# Patient Record
Sex: FEMALE | Race: BLACK OR AFRICAN AMERICAN | NOT HISPANIC OR LATINO | Employment: OTHER
[De-identification: names, ages, dates, MRNs, and addresses within clinical notes are randomized per-mention and may not be internally consistent; named-entity substitution may affect disease eponyms.]

---

## 2017-06-24 ENCOUNTER — HEALTH MAINTENANCE LETTER (OUTPATIENT)
Age: 62
End: 2017-06-24

## 2018-06-30 ENCOUNTER — HEALTH MAINTENANCE LETTER (OUTPATIENT)
Age: 63
End: 2018-06-30

## 2020-08-04 ENCOUNTER — TRANSCRIBE ORDERS (OUTPATIENT)
Dept: OTHER | Age: 65
End: 2020-08-04

## 2020-08-04 DIAGNOSIS — C34.90 LUNG CANCER (H): Primary | ICD-10-CM

## 2020-08-07 NOTE — TELEPHONE ENCOUNTER
ONCOLOGY INTAKE: Records Information      APPT INFORMATION:  Referring provider:  Self  Referring provider s clinic:  Records at [facility]  Reason for visit/diagnosis:  lung cancer  Has patient been notified of appointment date and time?: Yes    RECORDS INFORMATION:  Were the records received with the referral (via [employer])? Yes    Has patient been seen for any external appt for this diagnosis? Yes    If yes, where? [facility]    Has patient had any imaging or procedures outside of [place] for this condition? Yes      If Yes, where? [facility]    ADDITIONAL INFORMATION:  None

## 2020-08-10 NOTE — TELEPHONE ENCOUNTER
Action    Action Taken 8/10/20:    -[employer] Tracking ([facility] [id number]  8:59 AM    -Imaging resolved, and now viewable to PACS  3:39 PM     RECORDS STATUS - ALL OTHER DIAGNOSIS      RECORDS RECEIVED FROM:    DATE RECEIVED:    NOTES STATUS DETAILS   OFFICE NOTE from referring provider Self Notes in CE - [facility]   OFFICE NOTE from medical oncologist     DISCHARGE SUMMARY from hospital     DISCHARGE REPORT from the ER     PFT CE - [facility] 11/26/19, 1/20/16   OPERATIVE REPORT     MEDICATION LIST     CLINICAL TRIAL TREATMENTS TO DATE     LABS     PATHOLOGY REPORTS [facility], Report in CE, Slides requested 8/10 2/8/16: [id number]  1/13/16: [id number]   ANYTHING RELATED TO DIAGNOSIS     GENONOMIC TESTING     TYPE:     IMAGING (NEED IMAGES & REPORT) All imaging from [facility] requested, reports in CE    CT SCANS     MRI     MAMMO     ULTRASOUND     PET

## 2020-08-13 NOTE — TELEPHONE ENCOUNTER
[facility] Bx slides arrived on 8/13/2020 and were sent to the 5th floor path lab at the [facility]. NELLY

## 2020-08-18 PROCEDURE — 00000346 ZZHCL STATISTIC REVIEW OUTSIDE SLIDES TC 88321: Performed by: INTERNAL MEDICINE

## 2020-08-19 ENCOUNTER — PRE VISIT (OUTPATIENT)
Dept: ONCOLOGY | Facility: CLINIC | Age: 65
End: 2020-08-19

## 2020-08-19 ENCOUNTER — VIRTUAL VISIT (OUTPATIENT)
Dept: ONCOLOGY | Facility: CLINIC | Age: 65
End: 2020-08-19
Attending: INTERNAL MEDICINE
Payer: COMMERCIAL

## 2020-08-19 DIAGNOSIS — E11.9 TYPE 2 DIABETES MELLITUS WITHOUT COMPLICATION, WITHOUT LONG-TERM CURRENT USE OF INSULIN (H): ICD-10-CM

## 2020-08-19 DIAGNOSIS — C34.90 LUNG CANCER (H): ICD-10-CM

## 2020-08-19 DIAGNOSIS — C34.91 ADENOCARCINOMA, LUNG, RIGHT (H): Primary | ICD-10-CM

## 2020-08-19 PROCEDURE — 99215 OFFICE O/P EST HI 40 MIN: CPT | Mod: 95 | Performed by: INTERNAL MEDICINE

## 2020-08-19 PROCEDURE — 40001009 ZZH VIDEO/TELEPHONE VISIT; NO CHARGE

## 2020-08-19 RX ORDER — ALBUTEROL SULFATE 90 UG/1
AEROSOL, METERED RESPIRATORY (INHALATION)
COMMUNITY
Start: 2019-01-10 | End: 2020-09-22

## 2020-08-19 NOTE — LETTER
8/19/2020         RE: Jenn Aparicio  [address]  [place] [address]        Dear Colleague,    Thank you for referring your patient, Jenn Aparicio, to the [facility]. Please see a copy of my visit note below.    [facility] CANCER [facility]IC    NEW PATIENT TELEPHONE VISIT NOTE    PATIENT NAME: Jenn Aparicio MRN # [id number]  DATE OF VISIT: August 19, 2020 YOB: 1955    Referring Provider: Self. Under the care of Dr. Michael Wilder at [facility] until now    PCP: Dr. Jo Olmstead at [facility]    CANCER TYPE: Lung adenocarcinoma  STAGE: IA2 [id number] poorly diff adeno RLL, then [id number] IA2 suspected NSCLC RUL, medically inoperable     ECOG PS: 1    Cancer Staging  No matching staging information was found for the patient.    PD-L1: N/A  Lung panel: N/A  NGS: N/A    SUMMARY    12/24/15 PET/CT  1/13/15 CT lung bx. Adenocarcinoma  2/8/16 R thoracotomy, RLL lobectomy (Dr. Cunha). Adenocarcinoma, 1.1 cm, assoicated with atypical adenomatous hyperplasia  10/18/19 CTA for shortness of breath. New 1.3 cm RUL nodule  11/4~6/19 PET/CT. 1.1 cm RUL hypermetabolic nodule (SUV 12.6)  12/26/19 Brain MRI negative for mets  1/14~1/28/20 SBRT to RUL nodule, 5000 cGy, every other day, 1000 cGy (Dr. Currie at [facility]). No bx due to O2 dependence and too much risk  8/19/20 First visit with me    SUBJECTIVE  Jenn is a 66 yo female who presents today to establish care for h/o lung cancer. She was under the care of Dr. Wilder at [facility]. Last visit with him was 5/1/20. She is transferring because of bad experiences with the system. More than once, she would show up for an appointment and be told it had been canceled or rescheduled. She travels with O2 from [employer], so when the appointment couldn't be accommodated, she would get frustrated. She does really enjoy working with the doctors themselves. Last CT chest non contrast was 4/28/20 and was negative for recurrence.    She's  overall under a lot of stress. Didn't elaborate too much but her place of living is one source of the stress. There's a lot of drugs around, which is making it harder for her to breath. Shortness of breath has overall gotten worse in the last few months. No significant coughing. She's not using nebs. Continues to use 2L O2 at home. Hoping for a condenser so she can be more mobile. Has a bit of neuropathy from diabetes. Had a fall about a month ago, but none recently. Uses a walker to get around. No recent fevers or chills. Had an episode in early March with fevers and aches that resolved. Has chronic back pain. No cough, numbness/tingling, problems with constipation, diarrhea, or urination.    PAST MEDICAL HISTORY    DM2  HCV IA  COPD. O2 dependent since late 2018. PFT 11/26/19. FEV1 0.64 (30%), FVC 1.69 (63%), DLCOunc 9.8 (38%)   HTN  H/o ITP  H/o disk herniation  Ankle surgery  Median neuropathy R  H/o sessile colon polyps 2014, h/o adenomas before that. [place]18 [place] [facility]. Sessile serated adenoma x 1, tubular adenoma x 3   H/o chronic LBP  Dyslipidemia  Cataract    CURRENT OUTPATIENT MEDICATIONS  Current Outpatient Medications   Medication Sig Dispense Refill     Aspirin (ASPIR-81 PO) Take  by mouth.       FISH OIL        nicotine (NICODERM CQ) 14 MG/24HR patch 2h hr Place 1 patch onto the skin every 24 hours.       sitagliptan-MetFORmin (JANUMET)  MG per tablet Take 1 tablet by mouth 2 times daily (with meals).        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 Former smoker, quit 2016, started at age 17. About 1/4 PPD x 40 years. Notes indicate she was smoking again in 2020. Notes also indicate possible prior h/o ETOH dependence. H/o homelessness.     FAMILY HISTORY:   Family History   Problem Relation Age of Onset     Depression Daughter      Alcohol/Drug Other          "self     Diabetes Unknown         self     Thyroid Disease Unknown         self     Asthma Other         self      REVIEW OF SYSTEMS  As above in the HPI, o/w complete 12-point ROS was negative.    PHYSICAL EXAM  No vitals due to telephone visit  Wt Readings from Last 3 Encounters:   08/13/15 73.6 kg (162 lb 3.2 oz)   08/06/15 73.8 kg (162 lb 9.6 oz)   03/14/11 78.3 kg (172 lb 9.6 oz)     Remainder of physical exam deferred due to public health emergency and limitations of telephone visit.    LABORATORY AND IMAGING STUDIES     [place] [facility] [place]. Path was reviewed     ASSESSMENT AND PLAN  Lung adenocarcinoma, IA2 [id number] poorly diff adeno RLL, then [id number] IA2 suspected NSCLC RUL, medically inoperable: Now about 8 months after SBRT. Transferring care here. Worried that scan is delayed. Discussed we're still within an appropriate time frame, fortunately. Schedule CT chest abd and appt with me for sometime in the next couple of weeks.     Social: Wants a letter saying that the air at her place is not acceptable, to help her get out of her lease.     Chronic LBP: Continue working with PCP. She wants to change to our system for PCP as well. I encouraged her to stick with Dr. Jo Olmstead at [facility], since she's been working with her for years now. But she'd rather transfer everything to the [facility] system. I asked her to call the clinic in [place] to set up an appointment    DM, COPD: Per PCP     Rides: reached out to  to see what options there might be, either for here or [place], which is actually closer to her.    A total of 40  minutes was spent with the patient on the phone, >50% of which was spent in counseling and coordination of care.    Zarina Leung MD  Associate Professor of Medicine  Hematology, Oncology and Transplantation    Jenn Aparicio is a 65 year old female who is being evaluated via a billable telephone visit.    The patient has been notified of following:   \"This " "telephone visit will be conducted via a call between you and your physician/provider. We have found that certain health care needs can be provided without the need for a physical exam.  This service lets us provide the care you need with a short phone conversation.  If a prescription is necessary we can send it directly to your pharmacy.  If lab work is needed we can place an order for that and you can then stop by our lab to have the test done at a later time.  Telephone visits are billed at different rates depending on your insurance coverage. During this emergency period, for some insurers they may be billed the same as an in-person visit.  Please reach out to your insurance provider with any questions.  If during the course of the call the physician/provider feels a telephone visit is not appropriate, you will not be charged for this service.\"  Patient has given verbal consent for Telephone visit?  Yes  What phone number would you like to be contacted at? [phone]  How would you like to obtain your AVS? Mail a copy     Janet DUCKWORTH    Again, thank you for allowing me to participate in the care of your patient.        Sincerely,        Zarina Leung MD    "

## 2020-08-19 NOTE — LETTER
August 20, 2020      RE: Jenn Aparicio  [address]  [place] [address]         To Whom It May Concern,     Based on what Ms. Aparicio has shared with me, I believe it in her best interest to find a new place of residence if possible because of the health problems she is dealing with and the negative impact her place of residence is having on her health. I support that she be released from her lease if needed.      Sincerely,          Zarina Leung MD

## 2020-08-19 NOTE — PROGRESS NOTES
[facility]    NEW PATIENT TELEPHONE VISIT NOTE    PATIENT NAME: Jenn Aparicio MRN # [id number]  DATE OF VISIT: August 19, 2020 YOB: 1955    Referring Provider: Self. Under the care of Dr. Michael Wilder at [facility] until now    PCP: Dr. Jo Olmstead at [facility]    CANCER TYPE: Lung adenocarcinoma  STAGE: IA2 [id number] poorly diff adeno RLL, then [id number] IA2 suspected NSCLC RUL, medically inoperable     ECOG PS: 1    Cancer Staging  No matching staging information was found for the patient.    PD-L1: N/A  Lung panel: N/A  NGS: N/A    SUMMARY    12/24/15 PET/CT  1/13/15 CT lung bx. Adenocarcinoma  2/8/16 R thoracotomy, RLL lobectomy (Dr. Cunha). Adenocarcinoma, 1.1 cm, assoicated with atypical adenomatous hyperplasia  10/18/19 CTA for shortness of breath. New 1.3 cm RUL nodule  11/4~6/19 PET/CT. 1.1 cm RUL hypermetabolic nodule (SUV 12.6)  12/26/19 Brain MRI negative for mets  1/14~1/28/20 SBRT to RUL nodule, 5000 cGy, every other day, 1000 cGy (Dr. Currie at [facility]). No bx due to O2 dependence and too much risk  8/19/20 First visit with me    ALBERTO  Jenn is a 66 yo female who presents today to establish care for h/o lung cancer. She was under the care of Dr. Wilder at [facility]. Last visit with him was 5/1/20. She is transferring because of bad experiences with the system. More than once, she would show up for an appointment and be told it had been canceled or rescheduled. She travels with O2 from [place], so when the appointment couldn't be accommodated, she would get frustrated. She does really enjoy working with the doctors themselves. Last CT chest non contrast was 4/28/20 and was negative for recurrence.    She's overall under a lot of stress. Didn't elaborate too much but her place of living is one source of the stress. There's a lot of drugs around, which is making it harder for her to breath. Shortness of breath has overall gotten worse in the last  few months. No significant coughing. She's not using nebs. Continues to use 2L O2 at home. Hoping for a condenser so she can be more mobile. Has a bit of neuropathy from diabetes. Had a fall about a month ago, but none recently. Uses a walker to get around. No recent fevers or chills. Had an episode in early March with fevers and aches that resolved. Has chronic back pain. No cough, numbness/tingling, problems with constipation, diarrhea, or urination.    PAST MEDICAL HISTORY    DM2  HCV IA  COPD. O2 dependent since late 2018. PFT 11/26/19. FEV1 0.64 (30%), FVC 1.69 (63%), DLCOunc 9.8 (38%)   HTN  H/o ITP  H/o disk herniation  Ankle surgery  Median neuropathy R  H/o sessile colon polyps 2014, h/o adenomas before that. [place] [facility] Sessile serated adenoma x 1, tubular adenoma x 3   H/o chronic LBP  Dyslipidemia  Cataract    CURRENT OUTPATIENT MEDICATIONS  Current Outpatient Medications   Medication Sig Dispense Refill     Aspirin (ASPIR-81 PO) Take  by mouth.       FISH OIL        nicotine (NICODERM CQ) 14 MG/24HR patch 2h hr Place 1 patch onto the skin every 24 hours.       sitagliptan-MetFORmin (JANUMET)  MG per tablet Take 1 tablet by mouth 2 times daily (with meals).        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 Former smoker, quit 2016, started at age 17. About 1/4 PPD x 40 years. Notes indicate she was smoking again in 2020. Notes also indicate possible prior h/o ETOH dependence. H/o homelessness.     FAMILY HISTORY:   Family History   Problem Relation Age of Onset     Depression Daughter      Alcohol/Drug Other         self     Diabetes Unknown         self     Thyroid Disease Unknown         self     Asthma Other         self      REVIEW OF SYSTEMS  As above in the HPI, o/w complete 12-point ROS was negative.    PHYSICAL EXAM  No vitals due to telephone  "visit  Wt Readings from Last 3 Encounters:   08/13/15 73.6 kg (162 lb 3.2 oz)   08/06/15 73.8 kg (162 lb 9.6 oz)   03/14/11 78.3 kg (172 lb 9.6 oz)     Remainder of physical exam deferred due to public health emergency and limitations of telephone visit.    LABORATORY AND IMAGING STUDIES     [place] [facility] [place]. Path was reviewed     ASSESSMENT AND PLAN  Lung adenocarcinoma, IA2 [id number] poorly diff adeno RLL, then [id number] IA2 suspected NSCLC RUL, medically inoperable: Now about 8 months after SBRT. Transferring care here. Worried that scan is delayed. Discussed we're still within an appropriate time frame, fortunately. Schedule CT chest abd and appt with me for sometime in the next couple of weeks.     Social: Wants a letter saying that the air at her place is not acceptable, to help her get out of her lease.     Chronic LBP: Continue working with PCP. She wants to change to our system for PCP as well. I encouraged her to stick with [place]. Jo Olmstead at [facility], since she's been working with her for years now. But she'd rather transfer everything to the [facility] system. I asked her to call the clinic in [place] to set up an appointment    DM, COPD: Per PCP     Rides: reached out to SW to see what options there might be, either for here or [place], which is actually closer to her.    A total of 40  minutes was spent with the patient on the phone, >50% of which was spent in counseling and coordination of care.    Zarina Leung MD  Associate Professor of Medicine  Hematology, Oncology and Transplantation    Jenn Aparicio is a 65 year old female who is being evaluated via a billable telephone visit.    The patient has been notified of following:   \"This telephone visit will be conducted via a call between you and your physician/provider. We have found that certain health care needs can be provided without the need for a physical exam.  This service lets us provide the care you need with a " "short phone conversation.  If a prescription is necessary we can send it directly to your pharmacy.  If lab work is needed we can place an order for that and you can then stop by our lab to have the test done at a later time.  Telephone visits are billed at different rates depending on your insurance coverage. During this emergency period, for some insurers they may be billed the same as an in-person visit.  Please reach out to your insurance provider with any questions.  If during the course of the call the physician/provider feels a telephone visit is not appropriate, you will not be charged for this service.\"  Patient has given verbal consent for Telephone visit?  Yes  What phone number would you like to be contacted at? [phone]  How would you like to obtain your AVS? Mail a copy     Janet DUCKWORTH  "

## 2020-08-21 ENCOUNTER — PATIENT OUTREACH (OUTPATIENT)
Dept: ONCOLOGY | Facility: CLINIC | Age: 65
End: 2020-08-21

## 2020-08-21 ENCOUNTER — TELEPHONE (OUTPATIENT)
Dept: CARE COORDINATION | Facility: CLINIC | Age: 65
End: 2020-08-21

## 2020-08-21 NOTE — TELEPHONE ENCOUNTER
Social Work Note: Telephone Call  Oncology Clinic    Data/Intervention:  Patient Name:  Jenn Aparicio  /Age:  1955 (65 year old)    Call From:  SW contacted patient by phone.  Reason for Call:  Referral from MD to introduce services and supports as patient transitions [place] [facility]    Assessment:  SW contacted patient by phone. Introduced self and role in clinic. Also offered to support patient in navigating [facility] system in accessing resources and supports.  Patient indicated appreciation for outreach.  Asked about transportation as she had been using [employer] for rides to [facility] ([employer] is [place] Medicaid transportation provider). SW explained patient would be able to set up rides similar to previous process, but would need to provide different address for clinic location.  SW reviewed upcoming appointments with patient and addresses.  She indicated she would set up rides with [employer].  Writer instructed patient to call if any issues arose with setting up transport.  Patient also asked about transferring her primary care and diabetes care to [facility].  SW discussed options and offered suggestions. Provided phone numbers for scheduling new patient appointments. Patient indicated understanding and appreciation of call. SW contact information given. SW will continue to follow and check in with patient regarding needs and updates regarding her cancer care.     Plan:  SW continues to follow and is available to assist with any needs.     Soo Yeon Han, MSW, LICSW  Pager: [phone]  Phone: [phone]

## 2020-08-21 NOTE — PROGRESS NOTES
RN Care Coordination Note  Placed call to patient to introduce self and     Review resources available at [facility]. Unable to reach patient. Left message introducing self and description of how to reach clinic with numbers.      Bernadette Garcias RN, BSN, OCN   RN Care Coordinator   [facility]

## 2020-08-27 LAB — COPATH REPORT: NORMAL

## 2020-09-08 ENCOUNTER — APPOINTMENT (OUTPATIENT)
Dept: LAB | Facility: CLINIC | Age: 65
End: 2020-09-08
Attending: INTERNAL MEDICINE
Payer: COMMERCIAL

## 2020-09-08 ENCOUNTER — ANCILLARY PROCEDURE (OUTPATIENT)
Dept: CT IMAGING | Facility: CLINIC | Age: 65
End: 2020-09-08
Attending: INTERNAL MEDICINE
Payer: COMMERCIAL

## 2020-09-08 ENCOUNTER — ONCOLOGY VISIT (OUTPATIENT)
Dept: ONCOLOGY | Facility: CLINIC | Age: 65
End: 2020-09-08
Attending: INTERNAL MEDICINE
Payer: COMMERCIAL

## 2020-09-08 VITALS
TEMPERATURE: 97.3 F | RESPIRATION RATE: 18 BRPM | WEIGHT: 206.3 LBS | HEIGHT: 66 IN | SYSTOLIC BLOOD PRESSURE: 146 MMHG | DIASTOLIC BLOOD PRESSURE: 71 MMHG | OXYGEN SATURATION: 94 % | HEART RATE: 96 BPM | BODY MASS INDEX: 33.16 KG/M2

## 2020-09-08 DIAGNOSIS — C34.91 ADENOCARCINOMA, LUNG, RIGHT (H): Primary | ICD-10-CM

## 2020-09-08 DIAGNOSIS — C34.90 LUNG CANCER (H): ICD-10-CM

## 2020-09-08 DIAGNOSIS — E11.9 TYPE 2 DIABETES MELLITUS WITHOUT COMPLICATION, WITHOUT LONG-TERM CURRENT USE OF INSULIN (H): ICD-10-CM

## 2020-09-08 LAB
ALBUMIN SERPL-MCNC: 3.4 G/DL (ref 3.4–5)
ALP SERPL-CCNC: 101 U/L (ref 40–150)
ALT SERPL W P-5'-P-CCNC: 13 U/L (ref 0–50)
ANION GAP SERPL CALCULATED.3IONS-SCNC: 5 MMOL/L (ref 3–14)
AST SERPL W P-5'-P-CCNC: 10 U/L (ref 0–45)
BASOPHILS # BLD AUTO: 0 10E9/L (ref 0–0.2)
BASOPHILS NFR BLD AUTO: 0.8 %
BILIRUB SERPL-MCNC: 0.4 MG/DL (ref 0.2–1.3)
BUN SERPL-MCNC: 7 MG/DL (ref 7–30)
CALCIUM SERPL-MCNC: 8.9 MG/DL (ref 8.5–10.1)
CHLORIDE SERPL-SCNC: 105 MMOL/L (ref 94–109)
CO2 SERPL-SCNC: 28 MMOL/L (ref 20–32)
CREAT BLD-MCNC: 0.6 MG/DL (ref 0.52–1.04)
CREAT SERPL-MCNC: 0.58 MG/DL (ref 0.52–1.04)
DIFFERENTIAL METHOD BLD: NORMAL
EOSINOPHIL # BLD AUTO: 0.1 10E9/L (ref 0–0.7)
EOSINOPHIL NFR BLD AUTO: 2.3 %
ERYTHROCYTE [DISTWIDTH] IN BLOOD BY AUTOMATED COUNT: 13.6 % (ref 10–15)
GFR SERPL CREATININE-BSD FRML MDRD: >90 ML/MIN/{1.73_M2}
GFR SERPL CREATININE-BSD FRML MDRD: >90 ML/MIN/{1.73_M2}
GLUCOSE SERPL-MCNC: 118 MG/DL (ref 70–99)
HBA1C MFR BLD: 6.8 % (ref 0–5.6)
HCT VFR BLD AUTO: 46.4 % (ref 35–47)
HGB BLD-MCNC: 14.6 G/DL (ref 11.7–15.7)
IMM GRANULOCYTES # BLD: 0 10E9/L (ref 0–0.4)
IMM GRANULOCYTES NFR BLD: 0.4 %
LDH SERPL L TO P-CCNC: 166 U/L (ref 81–234)
LYMPHOCYTES # BLD AUTO: 1.6 10E9/L (ref 0.8–5.3)
LYMPHOCYTES NFR BLD AUTO: 29.7 %
MCH RBC QN AUTO: 28.5 PG (ref 26.5–33)
MCHC RBC AUTO-ENTMCNC: 31.5 G/DL (ref 31.5–36.5)
MCV RBC AUTO: 91 FL (ref 78–100)
MONOCYTES # BLD AUTO: 0.4 10E9/L (ref 0–1.3)
MONOCYTES NFR BLD AUTO: 7.7 %
NEUTROPHILS # BLD AUTO: 3.1 10E9/L (ref 1.6–8.3)
NEUTROPHILS NFR BLD AUTO: 59.1 %
NRBC # BLD AUTO: 0 10*3/UL
NRBC BLD AUTO-RTO: 0 /100
PLATELET # BLD AUTO: 194 10E9/L (ref 150–450)
POTASSIUM SERPL-SCNC: 4.1 MMOL/L (ref 3.4–5.3)
PROT SERPL-MCNC: 7.2 G/DL (ref 6.8–8.8)
RBC # BLD AUTO: 5.12 10E12/L (ref 3.8–5.2)
SODIUM SERPL-SCNC: 138 MMOL/L (ref 133–144)
WBC # BLD AUTO: 5.2 10E9/L (ref 4–11)

## 2020-09-08 PROCEDURE — G0463 HOSPITAL OUTPT CLINIC VISIT: HCPCS | Mod: ZF

## 2020-09-08 PROCEDURE — 80053 COMPREHEN METABOLIC PANEL: CPT | Performed by: INTERNAL MEDICINE

## 2020-09-08 PROCEDURE — 36592 COLLECT BLOOD FROM PICC: CPT

## 2020-09-08 PROCEDURE — 83036 HEMOGLOBIN GLYCOSYLATED A1C: CPT | Performed by: INTERNAL MEDICINE

## 2020-09-08 PROCEDURE — 85025 COMPLETE CBC W/AUTO DIFF WBC: CPT | Performed by: INTERNAL MEDICINE

## 2020-09-08 PROCEDURE — 99214 OFFICE O/P EST MOD 30 MIN: CPT | Mod: ZP | Performed by: INTERNAL MEDICINE

## 2020-09-08 PROCEDURE — 83615 LACTATE (LD) (LDH) ENZYME: CPT | Performed by: INTERNAL MEDICINE

## 2020-09-08 RX ORDER — AMLODIPINE BESYLATE 10 MG/1
10 TABLET ORAL DAILY
COMMUNITY
Start: 2020-09-08 | End: 2020-12-15

## 2020-09-08 RX ORDER — IOPAMIDOL 755 MG/ML
99 INJECTION, SOLUTION INTRAVASCULAR ONCE
Status: COMPLETED | OUTPATIENT
Start: 2020-09-08 | End: 2020-09-08

## 2020-09-08 RX ORDER — ALPHA LIPOIC ACID 300 MG
300 CAPSULE ORAL DAILY
COMMUNITY
Start: 2020-09-08 | End: 2021-11-10

## 2020-09-08 RX ORDER — GLIPIZIDE 2.5 MG/1
2.5 TABLET, EXTENDED RELEASE ORAL DAILY
COMMUNITY
Start: 2020-09-08 | End: 2020-12-15

## 2020-09-08 RX ADMIN — IOPAMIDOL 99 ML: 755 INJECTION, SOLUTION INTRAVASCULAR at 10:43

## 2020-09-08 ASSESSMENT — MIFFLIN-ST. JEOR: SCORE: 1497.52

## 2020-09-08 ASSESSMENT — PAIN SCALES - GENERAL: PAINLEVEL: NO PAIN (0)

## 2020-09-08 NOTE — PROGRESS NOTES
[facility] CANCER CLINIC    FOLLOW-UP VISIT NOTE    PATIENT NAME: Jenn Aparicio MRN # [id number]  DATE OF VISIT: September 8, 2020 YOB: 1955    CANCER TYPE: Lung adenocarcinoma  STAGE: IA2 [id number] poorly diff adeno RLL, then [id number] IA2 suspected NSCLC RUL, medically inoperable     ECOG PS: 1    Cancer Staging  No matching staging information was found for the patient.    PD-L1: N/A  Lung panel: N/A  NGS: N/A    SUMMARY    12/24/15 PET/CT  1/13/15 CT lung bx. Adenocarcinoma  2/8/16 R thoracotomy, RLL lobectomy (Dr. Cunha). Adenocarcinoma, 1.1 cm, assoicated with atypical adenomatous hyperplasia  10/18/19 CTA for shortness of breath. New 1.3 cm RUL nodule  11/4~6/19 PET/CT. 1.1 cm RUL hypermetabolic nodule (SUV 12.6)  12/26/19 Brain MRI negative for mets  1/14~1/28/20 SBRT to RUL nodule, 5000 cGy, every other day, 1000 cGy (Dr. Currie at [facility]). No bx due to O2 dependence and too much risk  8/19/20 First visit with me      SUBJECTIVE  Jenn returns today for follow up of h/o lung cancer.   Doing ok overall. Tired but doing ok. No significant changes since our last visit.   Will move out at the end of the month. Moving to [place]. She's happy about that but still incredibly stressed out by her present living conditions. Drugs from other units, smoke, poor air conditions, concern for mold. She has hepa filters that she has to change constantly and brings one in today to show me.   Otherwise no changes since last visit. Same aches and pains, chronic.   Hasn't called to make an appt with PCP.   Continues to use O2, usually 2L. Has run out of two inhalers but continues to use dulera.     PAST MEDICAL HISTORY  Lung adenocarcinoma  DM2  HCV IA  COPD. O2 dependent since late 2018. PFT 11/26/19. FEV1 0.64 (30%), FVC 1.69 (63%), DLCOunc 9.8 (38%)   HTN  H/o ITP  H/o disk herniation  Ankle surgery  Median neuropathy R  H/o sessile colon polyps 2014, h/o adenomas before that.  "Colonoscopy 2/7/18 @ [facility] [place]. Sessile serated adenoma x 1, tubular adenoma x 3   H/o chronic LBP  Dyslipidemia  Cataract    CURRENT OUTPATIENT MEDICATIONS  Current Outpatient Medications   Medication Sig Dispense Refill     albuterol (PROAIR HFA/PROVENTIL HFA/VENTOLIN HFA) 108 (90 Base) MCG/ACT inhaler INHALE 1 OR 2 PUFFS BY MOUTH EVERY 4 HOURS AS NEEDED       Alpha-Lipoic Acid 300 MG CAPS Take 300 mg by mouth daily       amLODIPine (NORVASC) 10 MG tablet Take 1 tablet (10 mg) by mouth daily       Aspirin (ASPIR-81 PO) Take  by mouth.       glipiZIDE (GLUCOTROL XL) 2.5 MG 24 hr tablet Take 1 tablet (2.5 mg) by mouth daily       mometasone-formoterol (DULERA) 200-5 MCG/ACT inhaler INHALE 2 PUFFS BY MOUTH TWICE DAILY       umeclidinium (INCRUSE ELLIPTA) 62.5 MCG/INH inhaler Inhale 1 puff into the lungs daily       ALLERGIES  Allergies   Allergen Reactions     Aspirin      Colon Care      Penicillins      REVIEW OF SYSTEMS  As above in the HPI, o/w complete 12-point ROS was negative.    PHYSICAL EXAM  BP (!) 146/71   Pulse 96   Temp 97.3  F (36.3  C) (Oral)   Resp 18   Ht 1.676 m (5' 6\")   Wt 93.6 kg (206 lb 4.8 oz)   SpO2 94%   BMI 33.30 kg/m    Wt Readings from Last 3 Encounters:   08/13/15 73.6 kg (162 lb 3.2 oz)   08/06/15 73.8 kg (162 lb 9.6 oz)   03/14/11 78.3 kg (172 lb 9.6 oz)     GEN: NAD  HEENT: EOMI, no icterus, injection or pallor  LUNGS: clear bilaterally albeit distant  CV: regular, no murmurs, rubs, or gallops  EXT: no edema  NEURO: alert  SKIN: no rashes    LABORATORY AND IMAGING STUDIES   Results for JOHN POOLE (MRN [id number]) as of 9/8/2020 12:13   9/8/2020 11:08   Sodium 138   Potassium 4.1   Chloride 105   Carbon Dioxide 28   Urea Nitrogen 7   Creatinine 0.58   GFR Estimate >90   GFR Estimate If Black >90   Calcium 8.9   Anion Gap 5   Albumin 3.4   Protein Total 7.2   Bilirubin Total 0.4   Alkaline Phosphatase 101   ALT 13   AST 10   Lactate Dehydrogenase 166   Glucose 118 (H)   WBC 5.2 "   Hemoglobin 14.6   Hematocrit 46.4   Platelet Count 194   RBC Count 5.12   MCV 91   MCH 28.5   MCHC 31.5   RDW 13.6   Diff Method Automated Method   % Neutrophils 59.1   % Lymphocytes 29.7   % Monocytes 7.7   % Eosinophils 2.3   % Basophils 0.8   % Immature Granulocytes 0.4   Nucleated RBCs 0   Absolute Neutrophil 3.1   Absolute Lymphocytes 1.6   Absolute Monocytes 0.4   Absolute Eosinophils 0.1   Absolute Basophils 0.0   Abs Immature Granulocytes 0.0   Absolute Nucleated RBC 0.0     CT CAP was done earlier today. Imaging was personally reviewed and I showed them to Jenn.   The RUL nodule has been treated, has some post radiation changes. I see a R hilar node that I think I also see on the non contrast [place] [facility] [place]. No FDG uptake was seen in the mediastinum at that time.      ASSESSMENT AND PLAN  Lung adenocarcinoma, RUL: Now just over 8 months after SBRT to the RUL adenocarcinoma. H/o RLL lobectomy in 2016 for lung adeno as well. Will await the radiologist report. I will call her if the report shows something different other than what we discussed. Otherwise discussed surveillance schedule, with CT chest every 3 months. I often alternate between w/ and w/o contrast. Next one in 3 months will be w/o contrast.     Tobacco dependence, COPD: On chronic O2, usually about 2L. Encouraged her to think about cessation. She wants to wait until after she's moved as her living situation is so stressful she doesn't think she'll be successful. Will ask her to work with PCP, or will refer to Tobacco Cessation Program once she's ready. She had a Pulmonologist at [facility] but might not need one if her new PCP is comfortable taking care of this. O/w will refer to Pulm.     Chronic LBP: Not discussed today.     DM: Per PCP. On glipizide. A1c ok today - checked to save her a blood draw in the near future.     Rides: Medical transportation. [place] is closer for her, but with transportation, should be ok to come to  the [place].     Social: Hopefully moving soon to a new place in [place].    A total of 25 minutes was spent with the patient, >50% of which was spent in counseling and coordination of care.    Zarina Leung MD  Associate Professor of Medicine  Hematology, Oncology and Transplantation

## 2020-09-08 NOTE — NURSING NOTE
"Oncology Rooming Note    September 8, 2020 11:14 AM   Jenn Aparicio is a 65 year old female who presents for:    Chief Complaint   Patient presents with     Blood Draw     Blood drawn via IV and vitals     Oncology Clinic Visit     Return: Lung CA     Initial Vitals: BP (!) 146/71   Pulse 96   Temp 97.3  F (36.3  C) (Oral)   Resp 18   Wt 93.6 kg (206 lb 4.8 oz)   SpO2 94%   BMI 35.41 kg/m   Estimated body mass index is 35.41 kg/m  as calculated from the following:    Height as of 8/13/15: 1.626 m (5' 4\").    Weight as of this encounter: 93.6 kg (206 lb 4.8 oz). Body surface area is 2.06 meters squared.  No Pain (0) Comment: Data Unavailable   No LMP recorded. Patient is postmenopausal.  Allergies reviewed: Yes  Medications reviewed: Yes    Medications: Medication refills not needed today.  Pharmacy name entered into GoodRx:    [facility] #[id number] - [address] AT [address] [place] [address] PHARMACY MAIL DELIVERY - [facility] [address]    Clinical concerns: N/A      Janet Avila CMA              "

## 2020-09-08 NOTE — LETTER
9/8/2020     RE: Jenn Aparicio  [address] [place] [address]    Dear Colleague,    Thank you for referring your patient, Jenn Aparicio, to the [facility] CANCER CLINIC. Please see a copy of my visit note below.    [facility] CANCER CLINIC    FOLLOW-UP VISIT NOTE    PATIENT NAME: Jenn Aparicio MRN # [id number]  DATE OF VISIT: September 8, 2020 YOB: 1955    CANCER TYPE: Lung adenocarcinoma  STAGE: IA2 [id number] poorly diff adeno RLL, then [id number] IA2 suspected NSCLC RUL, medically inoperable     ECOG PS: 1    Cancer Staging  No matching staging information was found for the patient.    PD-L1: N/A  Lung panel: N/A  NGS: N/A    SUMMARY    12/24/15 PET/CT  1/13/15 CT lung bx. Adenocarcinoma  2/8/16 R thoracotomy, RLL lobectomy (Dr. Cunha). Adenocarcinoma, 1.1 cm, assoicated with atypical adenomatous hyperplasia  10/18/19 CTA for shortness of breath. New 1.3 cm RUL nodule  11/4~6/19 PET/CT. 1.1 cm RUL hypermetabolic nodule (SUV 12.6)  12/26/19 Brain MRI negative for mets  1/14~1/28/20 SBRT to RUL nodule, 5000 cGy, every other day, 1000 cGy (Dr. Currie at [facility]). No bx due to O2 dependence and too much risk  8/19/20 First visit with me      SUBJECTIVE  Jenn returns today for follow up of h/o lung cancer.   Doing ok overall. Tired but doing ok. No significant changes since our last visit.   Will move out at the end of the month. Moving to [place]. She's happy about that but still incredibly stressed out by her present living conditions. Drugs from other units, smoke, poor air conditions, concern for mold. She has hepa filters that she has to change constantly and brings one in today to show me.   Otherwise no changes since last visit. Same aches and pains, chronic.   Hasn't called to make an appt with PCP.   Continues to use O2, usually 2L. Has run out of two inhalers but continues to use dulera.     PAST MEDICAL HISTORY  Lung adenocarcinoma  DM2  HCV IA  COPD.  "O2 dependent since late 2018. PFT 11/26/19. FEV1 0.64 (30%), FVC 1.69 (63%), DLCOunc 9.8 (38%)   HTN  H/o ITP  H/o disk herniation  Ankle surgery  Median neuropathy R  H/o sessile colon polyps 2014, h/o adenomas before that. Colonoscopy 2/7/18 @ [facility] [place] [place]. Sessile serated adenoma x 1, tubular adenoma x 3   H/o chronic LBP  Dyslipidemia  Cataract    CURRENT OUTPATIENT MEDICATIONS  Current Outpatient Medications   Medication Sig Dispense Refill     albuterol (PROAIR HFA/PROVENTIL HFA/VENTOLIN HFA) 108 (90 Base) MCG/ACT inhaler INHALE 1 OR 2 PUFFS BY MOUTH EVERY 4 HOURS AS NEEDED       Alpha-Lipoic Acid 300 MG CAPS Take 300 mg by mouth daily       amLODIPine (NORVASC) 10 MG tablet Take 1 tablet (10 mg) by mouth daily       Aspirin (ASPIR-81 PO) Take  by mouth.       glipiZIDE (GLUCOTROL XL) 2.5 MG 24 hr tablet Take 1 tablet (2.5 mg) by mouth daily       mometasone-formoterol (DULERA) 200-5 MCG/ACT inhaler INHALE 2 PUFFS BY MOUTH TWICE DAILY       umeclidinium (INCRUSE ELLIPTA) 62.5 MCG/INH inhaler Inhale 1 puff into the lungs daily       ALLERGIES  Allergies   Allergen Reactions     Aspirin      Colon Care      Penicillins      REVIEW OF SYSTEMS  As above in the HPI, o/w complete 12-point ROS was negative.    PHYSICAL EXAM  BP (!) 146/71   Pulse 96   Temp 97.3  F (36.3  C) (Oral)   Resp 18   Ht 1.676 m (5' 6\")   Wt 93.6 kg (206 lb 4.8 oz)   SpO2 94%   BMI 33.30 kg/m    Wt Readings from Last 3 Encounters:   08/13/15 73.6 kg (162 lb 3.2 oz)   08/06/15 73.8 kg (162 lb 9.6 oz)   03/14/11 78.3 kg (172 lb 9.6 oz)     GEN: NAD  HEENT: EOMI, no icterus, injection or pallor  LUNGS: clear bilaterally albeit distant  CV: regular, no murmurs, rubs, or gallops  EXT: no edema  NEURO: alert  SKIN: no rashes    LABORATORY AND IMAGING STUDIES   Results for POOLEJOHN FRANKLIN (MRN [id number]) as of 9/8/2020 12:13   9/8/2020 11:08   Sodium 138   Potassium 4.1   Chloride 105   Carbon Dioxide 28   Urea Nitrogen 7   Creatinine 0.58   GFR " Estimate >90   GFR Estimate If Black >90   Calcium 8.9   Anion Gap 5   Albumin 3.4   Protein Total 7.2   Bilirubin Total 0.4   Alkaline Phosphatase 101   ALT 13   AST 10   Lactate Dehydrogenase 166   Glucose 118 (H)   WBC 5.2   Hemoglobin 14.6   Hematocrit 46.4   Platelet Count 194   RBC Count 5.12   MCV 91   MCH 28.5   MCHC 31.5   RDW 13.6   Diff Method Automated Method   % Neutrophils 59.1   % Lymphocytes 29.7   % Monocytes 7.7   % Eosinophils 2.3   % Basophils 0.8   % Immature Granulocytes 0.4   Nucleated RBCs 0   Absolute Neutrophil 3.1   Absolute Lymphocytes 1.6   Absolute Monocytes 0.4   Absolute Eosinophils 0.1   Absolute Basophils 0.0   Abs Immature Granulocytes 0.0   Absolute Nucleated RBC 0.0     CT CAP was done earlier today. Imaging was personally reviewed and I showed them to Jenn.   The RUL nodule has been treated, has some post radiation changes. I see a R hilar node that I think I also see on the non contrast PET/CT from [facility] from [place]. No FDG uptake was seen in the mediastinum at that time.      ASSESSMENT AND PLAN  Lung adenocarcinoma, RUL: Now just over 8 months after SBRT to the RUL adenocarcinoma. H/o RLL lobectomy in 2016 for lung adeno as well. Will await the radiologist report. I will call her if the report shows something different other than what we discussed. Otherwise discussed surveillance schedule, with CT chest every 3 months. I often alternate between w/ and w/o contrast. Next one in 3 months will be w/o contrast.     Tobacco dependence, COPD: On chronic O2, usually about 2L. Encouraged her to think about cessation. She wants to wait until after she's moved as her living situation is so stressful she doesn't think she'll be successful. Will ask her to work with PCP, or will refer to Tobacco Cessation Program once she's ready. She had a Pulmonologist at [facility] but might not need one if her new PCP is comfortable taking care of this. O/w will refer to Pulm.     Chronic LBP: Not  discussed today.     DM: Per PCP. On glipizide. A1c ok today - checked to save her a blood draw in the near future.     Rides: Medical transportation. [place] is closer for her, but with transportation, should be ok to come to the .     Social: Hopefully moving soon to a new place in [place].    A total of 25 minutes was spent with the patient, >50% of which was spent in counseling and coordination of care.    Zarina Leung MD  Associate Professor of Medicine  Hematology, Oncology and Transplantation    Chief Complaint   Patient presents with     Blood Draw     Blood drawn via IV and vitals     Labs drawn via IV placed by Imaging  in right arm.  Blood drawn and IV flushed with saline and removed from arm.     Again, thank you for allowing me to participate in the care of your patient.      Sincerely,      Zarina Leung MD

## 2020-09-08 NOTE — PROGRESS NOTES
Chief Complaint   Patient presents with     Blood Draw     Blood drawn via IV and vitals     Labs drawn via IV placed by Imaging  in right arm.  Blood drawn and IV flushed with saline and removed from arm.

## 2020-09-09 ENCOUNTER — TELEPHONE (OUTPATIENT)
Dept: CARE COORDINATION | Facility: CLINIC | Age: 65
End: 2020-09-09

## 2020-09-09 NOTE — TELEPHONE ENCOUNTER
Social Work Note: Telephone Call  Oncology Clinic    Data/Intervention:  Patient Name:  Jenn Aparicio  /Age:  1955 (65 year old)    Call From:  SW contacted patient by phone.  Reason for Call:  Follow up on voicemail from patient requesting outreach and resources.    Assessment:  Social work spoke with patient over the phone.  Patient indicated she is having some conflicts with her current apartment landlord and has been told she will need to vacate her current apartment within a month.  Patient stated she has already been looking for a new apartment and is working with a  about this but is looking for assistance in getting more time to move from her current home.  SW suggested patient speak with [employer], a legal resource for renters. Contact information given to patient.     Plan:  Patient has SW contact information from previous encounters. SW available to assist with any needs.     Soo Yeon Han, MSW, [facility] Coast HospitalSW  Pager: [phone]  Phone: [phone]

## 2020-09-22 ENCOUNTER — DOCUMENTATION ONLY (OUTPATIENT)
Dept: FAMILY MEDICINE | Facility: CLINIC | Age: 65
End: 2020-09-22

## 2020-09-22 ENCOUNTER — VIRTUAL VISIT (OUTPATIENT)
Dept: INTERNAL MEDICINE | Facility: CLINIC | Age: 65
End: 2020-09-22
Payer: COMMERCIAL

## 2020-09-22 DIAGNOSIS — C34.91 ADENOCARCINOMA OF RIGHT LUNG (H): ICD-10-CM

## 2020-09-22 DIAGNOSIS — C34.90 ADENOCARCINOMA OF LUNG, UNSPECIFIED LATERALITY (H): Primary | ICD-10-CM

## 2020-09-22 PROBLEM — J43.9 PULMONARY EMPHYSEMA (H): Status: ACTIVE | Noted: 2020-09-22

## 2020-09-22 RX ORDER — ALBUTEROL SULFATE 0.83 MG/ML
2.5 SOLUTION RESPIRATORY (INHALATION) EVERY 6 HOURS PRN
Qty: 90 ML | Refills: 3 | Status: SHIPPED | OUTPATIENT
Start: 2020-09-22 | End: 2021-06-22

## 2020-09-22 RX ORDER — ALBUTEROL SULFATE 90 UG/1
AEROSOL, METERED RESPIRATORY (INHALATION)
Qty: 1 INHALER | Refills: 3 | Status: SHIPPED | OUTPATIENT
Start: 2020-09-22 | End: 2020-12-08

## 2020-09-22 ASSESSMENT — PATIENT HEALTH QUESTIONNAIRE - PHQ9: SUM OF ALL RESPONSES TO PHQ QUESTIONS 1-9: 10

## 2020-09-22 NOTE — PROGRESS NOTES
"Video Visit Technology for this patient: No video technology available to patient, please call patient over the phone.    Jenn Aparicio is a 65 year old female who is being evaluated via a billable video visit.      The patient has been notified of following:     \"This video visit will be conducted via a call between you and your physician/provider. We have found that certain health care needs can be provided without the need for an in-person physical exam.  This service lets us provide the care you need with a video conversation.  If a prescription is necessary we can send it directly to your pharmacy.  If lab work is needed we can place an order for that and you can then stop by our lab to have the test done at a later time.    Video visits are billed at different rates depending on your insurance coverage.  Please reach out to your insurance provider with any questions.    If during the course of the call the physician/provider feels a video visit is not appropriate, you will not be charged for this service.\"    Patient has given verbal consent for Video visit? Yes  How would you like to obtain your AVS? Mail a copy  If you are dropped from the video visit, the video invite should be resent to: Text to cell phone: call patient at [phone]  Will anyone else be joining your video visit? No         Subjective     Jenn Aparicio is a 65 year old female who is a new pt to [facility] and  presents today via video visit for the following health issues:    HPI  Jenn Aparicio is establishing care at [facility] for her  adenocarcinoma of the right lung. She usually uses her nebulizer TID for albuterol but is requesting an MDI so she is more mobile. She also needs her Dulera and Incruz Ellipta inhalers refilled.    She is also requesting a 4 wheeled walker with a seat and bag.  Last winter she fell with her walker and bent a wheel and it is not useable.  She has to travel with an O2 tank and concentrator.       Review of Systems     She " is in constant pain and is considering medical marijuana.  Asking for more information.        Objective       No exam done on this telephone visit.    Vitals:  No vitals were obtained today due to virtual visit.    Physical Exam     GENERAL: Healthy, alert and no distress  RESP: No audible wheeze, cough, or visible cyanosis.  No visible retractions or increased work of breathing.    NEURO:   Mentation and speech appropriate for age.  PSYCH: Mentation appears normal, affect normal/bright, judgement and insight intact, normal speech.     Jenn was seen today for establish care, medication refill and dme.    Diagnoses and all orders for this visit:    Adenocarcinoma of lung, unspecified laterality (H)  -     umeclidinium (INCRUSE ELLIPTA) 62.5 MCG/INH inhaler; Inhale 1 puff into the lungs daily  -     mometasone-formoterol (DULERA) 200-5 MCG/ACT inhaler; Inhale 2 puffs 2-4 x a day prn.  -     albuterol (PROAIR HFA/PROVENTIL HFA/VENTOLIN HFA) 108 (90 Base) MCG/ACT inhaler; INHALE 1 OR 2 PUFFS BY MOUTH EVERY 4 HOURS AS NEEDED  -     albuterol (PROVENTIL) (2.5 MG/3ML) 0.083% neb solution; Take 1 vial (2.5 mg) by nebulization every 6 hours as needed for shortness of breath / dyspnea or wheezing.  I recommended she discuss with her Oncologist the possibility of medical marijuana.   -     DME for 4 wheeled walker and seat.      Video-Visit Details    Type of service:  Telephone Visit    2:05 - 2:22    Originating Location (pt. Location): Home    Distant Location (provider location):  [facility]IMARY [facility]ARE [facility]IN[facility]     Platform used for Video Visit: Azalea LABOY, CNP

## 2020-09-22 NOTE — NURSING NOTE
Chief Complaint   Patient presents with     Establish Care     Pt would like a new PCP.      Medication Refill     Pt would like medication refills.      Dme     Pt would like a new walker order. Pt would like it sent to [facility] Fax: [phone]     Caron Delgadillo LPN at 1:53 PM on 9/22/2020.     Pc, spoke to pharmacy who stated they don't have any pending medication orders. Pc, left message for pt to call office and let us know if she received her methotrexate

## 2020-09-24 DIAGNOSIS — C34.90 ADENOCARCINOMA OF LUNG, UNSPECIFIED LATERALITY (H): ICD-10-CM

## 2020-09-24 NOTE — PROGRESS NOTES
Telephone call (due to COVID-19) to the client's home for initial health risk assessment.  An  was not needed.    Current situation/living environment  Client lives alone in a 1 bedroom apartment. She has been unhappy there for a while citing drugs on the premisis, poor air quality and difficulty with management. She is moving to an apartment in [place] next month and is looking forward to the new environment.    Activities of daily living (ADL)/instrumental activities of daily living (IADL) and functional issues  Client needs help with the following ADL's: dressing, grooming, bathing, and transfers  Client needs help with the following IADL's: shopping, housekeeping and laundry  Client states she is unable to perform the above due to SOB r/t COPD and lung cancer and dependence on O2. Client has PCA and homemaking in place for assistance.      Health concerns for today  Client fell back in April and since that time has had increased pain. She has been to the doctor and nothing was broken but she reports continued pain in one shoulder. She is also going through treatment and being monitored for adenocarcinoma. She is in the process of changing over [facility] medical care [facility] to [facility] and thus far has established care with oncology and primary care.  Has patient fallen 2 or more times in the last year? Yes  Has patient fallen with injury in the last year? Yes    Cognition/mental health  Client is A&O x3. She reports some depression and anxiety symptoms r/t the issues at her building but denies need for intervention as she feels it is situational and believes she will feel better once she moves.    STARS/Med Adherence  Client is non-compliant with the following quality measures: Diabetes care - eye exam and Colon cancer screening  Comments: education efforts    Client's Plan of Care consists of:  Homemaker services, Life line, Personal care assistance and Specialized supplies, ILS and equipment (air  purifier, 4ww) all under CADI waiver.    Marti Christianson, RN  [facility] Care Coordinator  [phone]

## 2020-09-25 NOTE — TELEPHONE ENCOUNTER
mometasone-formoterol (DULERA) 200-5 MCG/ACT inhaler  Inhale 2 puffs 2-4 x a day prn  Last Written Prescription Date:  9/22/20  Last Fill Quantity: 6 inhaler,   # refills: 4  Last Office Visit : 9/22/20  Future Office visit:  None    Routing refill request to provider for review/approval because:  Clarify dosage:per [employer]- maximum dose is 4 puffs per day     Previous SIG INHALE 2 PUFFS BY MOUTH TWICE DAILY

## 2020-10-06 NOTE — NURSING NOTE
DME - a new walker order was faxed to [facility] Fax: [phone].    Kg-Elsa Andres RN

## 2020-10-07 ENCOUNTER — TELEPHONE (OUTPATIENT)
Dept: CARE COORDINATION | Facility: CLINIC | Age: 65
End: 2020-10-07

## 2020-10-07 NOTE — TELEPHONE ENCOUNTER
Social Work Note: Telephone Call  Oncology Clinic    Data/Intervention:  Patient Name:  Jenn Aparicio  /Age:  1955 (65 year old)    Call From:  SW received phone call from patient.  Reason for Call:  Request from patient for letter    Assessment:  SW spoke with patient who is requesting letter from her doctor.  She stated she is working to secure new housing and needs a letter stating that she would need a 2 bedroom apartment due to her medical needs.  SW agreed to relay request to her provider.      Plan:  SW gave contact number to patient and will follow up regarding letter status.    Soo Yeon Han, MSW, LICSW  Pager: [phone]  Phone: [phone]

## 2020-10-29 ENCOUNTER — DOCUMENTATION ONLY (OUTPATIENT)
Dept: ONCOLOGY | Facility: CLINIC | Age: 65
End: 2020-10-29

## 2020-10-29 NOTE — PROGRESS NOTES
Reasonable accomodations form received from Dr Leung.      Forms to be completed and put in folder for provider to approve.    Fax #:  [phone]        Monse West CMA ([facility])       negative Soft, non-tender, no hepatosplenomegaly, normal bowel sounds

## 2020-10-30 NOTE — PROGRESS NOTES
Spoke with pt, per request of Dr Leung to discuss forms that was received for accommodations.  Patient states this form needs to be completed in addition to the letter she already signed and gave pt.    Message sent to Dr Leung advising of this.    Monse West CMA ([facility])

## 2020-11-18 NOTE — TELEPHONE ENCOUNTER
----- Message from Bernadette Garcias, RN sent at 11/18/2020  3:52 PM CST -----  Regarding: FW: section 8 paperwork  Hi BOH,     Can someone tell me if there are forms for Dr Leugn to sign for this patient? If so, she can sign electronically.     Barbara Fernandez   ----- Message -----  From: Camille Vega  Sent: 11/18/2020   3:35 PM CST  To: Zarina Leung MD, Bernadette Garcias, JOHANN  Subject: section 8 paperwork                              Marti Daley from [facility] called and is wondering what the status of the section 8 housing paperwork is.     Apparently the paperwork is sitting in a folder somewhere. I assume it needs a signature?    Can someone give her a call? [phone].    Thanks!    Camille HENDERSON ( Clinic Coordinator)  [facility]  [phone], press option 5, then option 2

## 2020-11-18 NOTE — PROGRESS NOTES
Reasonable accomodation request filled out and signed by Dr Leung.  Faxed to [facility] @ [phone].    Pt was notified, copy was sent to farheen.    Monse West CMA ([facility])

## 2020-12-08 ENCOUNTER — VIRTUAL VISIT (OUTPATIENT)
Dept: ONCOLOGY | Facility: CLINIC | Age: 65
End: 2020-12-08
Attending: INTERNAL MEDICINE
Payer: COMMERCIAL

## 2020-12-08 DIAGNOSIS — C34.90 ADENOCARCINOMA OF LUNG, UNSPECIFIED LATERALITY (H): ICD-10-CM

## 2020-12-08 DIAGNOSIS — J44.9 COPD (CHRONIC OBSTRUCTIVE PULMONARY DISEASE) (H): Primary | ICD-10-CM

## 2020-12-08 DIAGNOSIS — J44.1 COPD EXACERBATION (H): Primary | ICD-10-CM

## 2020-12-08 PROBLEM — E11.9 DIABETES MELLITUS, TYPE 2 (H): Status: ACTIVE | Noted: 2020-12-08

## 2020-12-08 PROCEDURE — 99214 OFFICE O/P EST MOD 30 MIN: CPT | Mod: 95 | Performed by: INTERNAL MEDICINE

## 2020-12-08 PROCEDURE — 999N001193 HC VIDEO/TELEPHONE VISIT; NO CHARGE

## 2020-12-08 RX ORDER — PREDNISONE 20 MG/1
20 TABLET ORAL DAILY
Qty: 3 TABLET | Refills: 0 | Status: ON HOLD | OUTPATIENT
Start: 2020-12-08 | End: 2021-02-25

## 2020-12-08 RX ORDER — ALBUTEROL SULFATE 90 UG/1
AEROSOL, METERED RESPIRATORY (INHALATION)
Qty: 1 INHALER | Refills: 3 | Status: SHIPPED | OUTPATIENT
Start: 2020-12-08 | End: 2020-12-15

## 2020-12-08 NOTE — PROGRESS NOTES
[facility] CANCER CLINIC    FOLLOW-UP TELEPHONE VISIT NOTE    PATIENT NAME: Jenn Aparicio MRN # [id number]  DATE OF VISIT: December 8, 2020 YOB: 1955    CANCER TYPE: Lung adenocarcinoma  STAGE: IA2 [id number] poorly diff adeno RLL, then [id number] IA2 suspected NSCLC RUL, medically inoperable     ECOG PS: 1    Cancer Staging  No matching staging information was found for the patient.    PD-L1: N/A  Lung panel: N/A  NGS: N/A    SUMMARY  12/24/15 PET/CT  1/13/15 CT lung bx. Adenocarcinoma  2/8/16 R thoracotomy, RLL lobectomy (Dr. Cunha). Adenocarcinoma, 1.1 cm, assoicated with atypical adenomatous hyperplasia  10/18/19 CTA for shortness of breath. New 1.3 cm RUL nodule  11/4~6/19 PET/CT. 1.1 cm RUL hypermetabolic nodule (SUV 12.6)  12/26/19 Brain MRI negative for mets  1/14~1/28/20 SBRT to RUL nodule, 5000 cGy, every other day, 1000 cGy [place] [facility] [place] No bx due to O2 dependence and too much risk  8/19/20 First visit with me      SUBJECTIVE  Jenn returns today for follow up of h/o lung cancer.     Having some pain on the side of her chest with walking and more shortness of breath. O2 is dropping a bit more than before with ambulation, as low as about the 70s and her daughter turned it up to 2.5 L. Has been going on for a couple of weeks. They've been painting the hallway in her place. Some cough with clear phelgm. No F/C. Might have had a fever a couple of weeks ago, didn't take her temperature. Hasn't moved yet - on hold due to the viral pandemic but not to [place] anymore. Her daughter has been looking around for her now. Has been a bit more sore than usual as well. Eats once a day - feels full, and feels like it might be getting stuck. No N/V. Some diarrhea intermittently. No blood or abdominal pain/cramping. Thinks it's related to food. Is really having a hard time with the place she's living at.     Wasn't able to get the CT and labs on Fri.     PAST MEDICAL  HISTORY  Lung adenocarcinoma  DM2  HCV IA  COPD. O2 dependent since late 2018. PFT 11/26/19. FEV1 0.64 (30%), FVC 1.69 (63%), DLCOunc 9.8 (38%)   HTN  H/o ITP  H/o disk herniation  Ankle surgery  Median neuropathy R  H/o sessile colon polyps 2014, h/o adenomas before that. [place] [facility] [place]essile serated adenoma x 1, tubular adenoma x 3   H/o chronic LBP  Dyslipidemia  Cataract    CURRENT OUTPATIENT MEDICATIONS  Current Outpatient Medications   Medication Sig Dispense Refill     albuterol (PROAIR HFA/PROVENTIL HFA/VENTOLIN HFA) 108 (90 Base) MCG/ACT inhaler INHALE 1 OR 2 PUFFS BY MOUTH EVERY 4 HOURS AS NEEDED 1 Inhaler 3     Alpha-Lipoic Acid 300 MG CAPS Take 300 mg by mouth daily       amLODIPine (NORVASC) 10 MG tablet Take 1 tablet (10 mg) by mouth daily       Aspirin (ASPIR-81 PO) Take  by mouth.       glipiZIDE (GLUCOTROL XL) 2.5 MG 24 hr tablet Take 1 tablet (2.5 mg) by mouth daily       umeclidinium (INCRUSE ELLIPTA) 62.5 MCG/INH inhaler Inhale 1 puff into the lungs daily 1 each 3     albuterol (PROVENTIL) (2.5 MG/3ML) 0.083% neb solution Take 1 vial (2.5 mg) by nebulization every 6 hours as needed for shortness of breath / dyspnea or wheezing 90 mL 3     mometasone-formoterol (DULERA) 200-5 MCG/ACT inhaler Inhale 2 puffs 2 times a day prn. (Patient not taking: Reported on 12/8/2020) 6 Inhaler 4     ALLERGIES  Allergies   Allergen Reactions     Aspirin      Colon Care      Penicillins      REVIEW OF SYSTEMS  As above in the HPI, o/w complete 12-point ROS was negative.    PHYSICAL EXAM  No vitals due to virtual video visit   Wt Readings from Last 3 Encounters:   09/08/20 93.6 kg (206 lb 4.8 oz)   08/13/15 73.6 kg (162 lb 3.2 oz)   08/06/15 73.8 kg (162 lb 9.6 oz)     Remainder of physical exam deferred due to public health emergency and limitations of telephone visit.    LABORATORY AND IMAGING STUDIES  No new labs for today    ASSESSMENT AND PLAN  H/o lung adenocarcinoma s/p lobectomy 2015, presumed  recurrence RUL s/p SBRT Jan 2020: Now almost a year out from SBRT. Didn't get CT chest prior to appt with me. Discussed schedule for surveillance. Will reschedule CT sometime in the next couple of weeks. I'll call her afterward. If no worrisome findings, repeat CT chest w/contrast in 4 months with labs and ANN visit, alternating visits with me. Will continue every 3-4 months for the second year.     Severe COPD, suspected COPD exacerbation: Having more trouble breathing, ongoing for at least a couple of weeks. Hasn't sought medical care. Encouraged her to see Mitzi Nettles regularly about the breathing, and also explained the rationale of going through primary care rather than me, since I would typically see her relatively infrequently for lung cancer surveillance. Also discussed whether she wanted a referral to Pulmonary medicine in the [facility] system - she used to see Dr. Reed at [facility] [place]. But as discussed before, might be best to keep things simple and work with Mitzi Nettles first. Jenn doesn't have her # handy, so I will ask our schedulers to have the primary care clinic reach out to Jenn for an appointment.     Diarrhea: Not a new problem, but can be fairly significant at times and intermittent. Might be related to diet. Asked her to discuss with Dr. Nettles.     Tobacco dependence: Not discussed today, wasn't ready to quit before, have encouraged her to work with her PCP and have discussed the cessation program before.     Social: Continues to be hopeful to be able to move to a safer place soon. Her brother is also going through lung cancer treatment currently.     A total of 25 minutes was spent with the patient, >50% of which was spent in counseling and coordination of care.    Zarina Leung MD  Associate Professor of Medicine  Hematology, Oncology and Transplantation    Jenn Aparicio is a 65 year old female who is being evaluated via a billable telephone visit.    The patient has been notified of  "following:   \"This telephone visit will be conducted via a call between you and your physician/provider. We have found that certain health care needs can be provided without the need for a physical exam.  This service lets us provide the care you need with a short phone conversation.  If a prescription is necessary we can send it directly to your pharmacy.  If lab work is needed we can place an order for that and you can then stop by our lab to have the test done at a later time.  Telephone visits are billed at different rates depending on your insurance coverage. During this emergency period, for some insurers they may be billed the same as an in-person visit.  Please reach out to your insurance provider with any questions.  If during the course of the call the physician/provider feels a telephone visit is not appropriate, you will not be charged for this service.\"  Patient has given verbal consent for Telephone visit?  Yes  What phone number would you like to be contacted at? [phone]  How would you like to obtain your AVS? Mail a copy  I have reviewed and updated the patient's allergies and medication list. Patient was asked if they had any patient reported vital signs to present, if yes, please see documented vitals.  Patient was also asked for their current weight and height, if presented, documented in vitals.  Concerns: Pt is having trouble breathing, O2 has been as low at 77.  Refills: Refill of Albuterol inhaler   Monse West CMA (AAMA)  "

## 2020-12-08 NOTE — LETTER
12/8/2020         RE: Jenn Aparicio  [address] [place] [address]        Dear Colleague,    Thank you for referring your patient, Jenn Aparicio, to the [facility] CANCER [facility]INIC. Please see a copy of my visit note below.    [facility] CANCER CLINIC    FOLLOW-UP TELEPHONE VISIT NOTE    PATIENT NAME: Jenn Aparicio MRN # [id number]  DATE OF VISIT: December 8, 2020 YOB: 1955    CANCER TYPE: Lung adenocarcinoma  STAGE: IA2 [id number] poorly diff adeno RLL, then [id number] IA2 suspected NSCLC RUL, medically inoperable     ECOG PS: 1    Cancer Staging  No matching staging information was found for the patient.    PD-L1: N/A  Lung panel: N/A  NGS: N/A    SUMMARY  12/24/15 PET/CT  1/13/15 CT lung bx. Adenocarcinoma  2/8/16 R thoracotomy, RLL lobectomy (Dr. Cunha). Adenocarcinoma, 1.1 cm, assoicated with atypical adenomatous hyperplasia  10/18/19 CTA for shortness of breath. New 1.3 cm RUL nodule  11/4~6/19 PET/CT. 1.1 cm RUL hypermetabolic nodule (SUV 12.6)  12/26/19 Brain MRI negative for mets  1/14~1/28/20 SBRT to RUL nodule, 5000 cGy, every other day, 1000 cGy [place] [facility] [place] No bx due to O2 dependence and too much risk  8/19/20 First visit with me      SUBJECTIVE  Jenn returns today for follow up of h/o lung cancer.     Having some pain on the side of her chest with walking and more shortness of breath. O2 is dropping a bit more than before with ambulation, as low as about the 70s and her daughter turned it up to 2.5 L. Has been going on for a couple of weeks. They've been painting the hallway in her place. Some cough with clear phelgm. No F/C. Might have had a fever a couple of weeks ago, didn't take her temperature. Hasn't moved yet - on hold due to the viral pandemic but not to [place] anymore. Her daughter has been looking around for her now. Has been a bit more sore than usual as well. Eats once a day - feels full, and feels like it might  be getting stuck. No N/V. Some diarrhea intermittently. No blood or abdominal pain/cramping. Thinks it's related to food. Is really having a hard time with the place she's living at.     Wasn't able to get the CT and labs on Fri.     PAST MEDICAL HISTORY  Lung adenocarcinoma  DM2  HCV IA  COPD. O2 dependent since late 2018. PFT 11/26/19. FEV1 0.64 (30%), FVC 1.69 (63%), DLCOunc 9.8 (38%)   HTN  H/o ITP  H/o disk herniation  Ankle surgery  Median neuropathy R  H/o sessile colon polyps 2014, h/o adenomas before that. [place]oscopy 2/7/18 [place] [facility] [place]. Sessile serated adenoma x 1, tubular adenoma x 3   H/o chronic LBP  Dyslipidemia  Cataract    CURRENT OUTPATIENT MEDICATIONS  Current Outpatient Medications   Medication Sig Dispense Refill     albuterol (PROAIR HFA/PROVENTIL HFA/VENTOLIN HFA) 108 (90 Base) MCG/ACT inhaler INHALE 1 OR 2 PUFFS BY MOUTH EVERY 4 HOURS AS NEEDED 1 Inhaler 3     Alpha-Lipoic Acid 300 MG CAPS Take 300 mg by mouth daily       amLODIPine (NORVASC) 10 MG tablet Take 1 tablet (10 mg) by mouth daily       Aspirin (ASPIR-81 PO) Take  by mouth.       glipiZIDE (GLUCOTROL XL) 2.5 MG 24 hr tablet Take 1 tablet (2.5 mg) by mouth daily       umeclidinium (INCRUSE ELLIPTA) 62.5 MCG/INH inhaler Inhale 1 puff into the lungs daily 1 each 3     albuterol (PROVENTIL) (2.5 MG/3ML) 0.083% neb solution Take 1 vial (2.5 mg) by nebulization every 6 hours as needed for shortness of breath / dyspnea or wheezing 90 mL 3     mometasone-formoterol (DULERA) 200-5 MCG/ACT inhaler Inhale 2 puffs 2 times a day prn. (Patient not taking: Reported on 12/8/2020) 6 Inhaler 4     ALLERGIES  Allergies   Allergen Reactions     Aspirin      Colon Care      Penicillins      REVIEW OF SYSTEMS  As above in the HPI, o/w complete 12-point ROS was negative.    PHYSICAL EXAM  No vitals due to virtual video visit   Wt Readings from Last 3 Encounters:   09/08/20 93.6 kg (206 lb 4.8 oz)   08/13/15 73.6 kg (162 lb 3.2 oz)   08/06/15 73.8 kg (162 lb  9.6 oz)     Remainder of physical exam deferred due to public health emergency and limitations of telephone visit.    LABORATORY AND IMAGING STUDIES  No new labs for today    ASSESSMENT AND PLAN  H/o lung adenocarcinoma s/p lobectomy 2015, presumed recurrence RUL s/p SBRT Jan 2020: Now almost a year out from SBRT. Didn't get CT chest prior to appt with me. Discussed schedule for surveillance. Will reschedule CT sometime in the next couple of weeks. I'll call her afterward. If no worrisome findings, repeat CT chest w/contrast in 4 months with labs and ANN visit, alternating visits with me. Will continue every 3-4 months for the second year.     Severe COPD, suspected COPD exacerbation: Having more trouble breathing, ongoing for at least a couple of weeks. Hasn't sought medical care. Encouraged her to see Mitzi Nettles regularly about the breathing, and also explained the rationale of going through primary care rather than me, since I would typically see her relatively infrequently for lung cancer surveillance. Also discussed whether she wanted a referral to Pulmonary medicine in the [facility] system - she used to see Dr. Reed at [facility] [place]. But as discussed before, might be best to keep things simple and work with Mitzi Nettles first. Jenn doesn't have her # handy, so I will ask our schedulers to have the primary care clinic reach out to Jenn for an appointment.     Diarrhea: Not a new problem, but can be fairly significant at times and intermittent. Might be related to diet. Asked her to discuss with Dr. Nettles.     Tobacco dependence: Not discussed today, wasn't ready to quit before, have encouraged her to work with her PCP and have discussed the cessation program before.     Social: Continues to be hopeful to be able to move to a safer place soon. Her brother is also going through lung cancer treatment currently.     A total of 25 minutes was spent with the patient, >50% of which was spent in counseling and  "coordination of care.    Zarina Leung MD  Associate Professor of Medicine  Hematology, Oncology and Transplantation    Jenn Aparicio is a 65 year old female who is being evaluated via a billable telephone visit.    The patient has been notified of following:   \"This telephone visit will be conducted via a call between you and your physician/provider. We have found that certain health care needs can be provided without the need for a physical exam.  This service lets us provide the care you need with a short phone conversation.  If a prescription is necessary we can send it directly to your pharmacy.  If lab work is needed we can place an order for that and you can then stop by our lab to have the test done at a later time.  Telephone visits are billed at different rates depending on your insurance coverage. During this emergency period, for some insurers they may be billed the same as an in-person visit.  Please reach out to your insurance provider with any questions.  If during the course of the call the physician/provider feels a telephone visit is not appropriate, you will not be charged for this service.\"  Patient has given verbal consent for Telephone visit?  Yes  What phone number would you like to be contacted at? [phone]  How would you like to obtain your AVS? Mail a copy  I have reviewed and updated the patient's allergies and medication list. Patient was asked if they had any patient reported vital signs to present, if yes, please see documented vitals.  Patient was also asked for their current weight and height, if presented, documented in vitals.  Concerns: Pt is having trouble breathing, O2 has been as low at 77.  Refills: Refill of Albuterol inhaler   Monse West CMA (AAMA)      Again, thank you for allowing me to participate in the care of your patient.        Sincerely,        Zarina Leung MD    "

## 2020-12-09 NOTE — TELEPHONE ENCOUNTER
RECORDS RECEIVED FROM: internal/CE   DATE RECEIVED: 12.23.20    NOTES STATUS DETAILS   OFFICE NOTE from referring provider internal  Zarina Leung   OFFICE NOTE from other specialist Internal/CE Internal 9.22.20 [place]  [facility] [place]  12.10.18     DISCHARGE SUMMARY from hospital na    DISCHARGE REPORT from the ER [facility]- 10.18.20 [place]  1.7.19 [place] M   MEDICATION LIST internal     IMAGING  (NEED IMAGES AND REPORTS)     CT SCAN Ce/internal  internal- 9.8.20  [facility] [place] 10[place]18[place]19,    CHEST XRAY (CXR) CE/scheduled  [facility] [place] 10.18.19, 1.7.19   Scheduled 12.15.20    TESTS     PULMONARY FUNCTION TESTING (PFT) CE/scheduled  [facility] [place] 11.26.19,  Scheduled 12.15.20

## 2020-12-15 ENCOUNTER — ANCILLARY PROCEDURE (OUTPATIENT)
Dept: CT IMAGING | Facility: CLINIC | Age: 65
End: 2020-12-15
Attending: INTERNAL MEDICINE
Payer: COMMERCIAL

## 2020-12-15 ENCOUNTER — VIRTUAL VISIT (OUTPATIENT)
Dept: INTERNAL MEDICINE | Facility: CLINIC | Age: 65
End: 2020-12-15
Payer: COMMERCIAL

## 2020-12-15 DIAGNOSIS — C34.90 ADENOCARCINOMA OF LUNG, UNSPECIFIED LATERALITY (H): ICD-10-CM

## 2020-12-15 DIAGNOSIS — E11.22 TYPE 2 DIABETES MELLITUS WITH STAGE 3 CHRONIC KIDNEY DISEASE, WITHOUT LONG-TERM CURRENT USE OF INSULIN, UNSPECIFIED WHETHER STAGE 3A OR 3B CKD (H): ICD-10-CM

## 2020-12-15 DIAGNOSIS — R19.7 DIARRHEA, UNSPECIFIED TYPE: Primary | ICD-10-CM

## 2020-12-15 DIAGNOSIS — I10 ESSENTIAL HYPERTENSION: ICD-10-CM

## 2020-12-15 DIAGNOSIS — C34.91 ADENOCARCINOMA, LUNG, RIGHT (H): ICD-10-CM

## 2020-12-15 DIAGNOSIS — J44.9 COPD (CHRONIC OBSTRUCTIVE PULMONARY DISEASE) (H): ICD-10-CM

## 2020-12-15 DIAGNOSIS — J44.9 COPD (CHRONIC OBSTRUCTIVE PULMONARY DISEASE) (H): Primary | ICD-10-CM

## 2020-12-15 DIAGNOSIS — C34.90 PRIMARY ADENOCARCINOMA OF LUNG, UNSPECIFIED LATERALITY (H): ICD-10-CM

## 2020-12-15 DIAGNOSIS — N18.30 TYPE 2 DIABETES MELLITUS WITH STAGE 3 CHRONIC KIDNEY DISEASE, WITHOUT LONG-TERM CURRENT USE OF INSULIN, UNSPECIFIED WHETHER STAGE 3A OR 3B CKD (H): ICD-10-CM

## 2020-12-15 LAB
6 MIN WALK (FT): 480 FT
6 MIN WALK (M): 146 M
ALBUMIN SERPL-MCNC: 3.4 G/DL (ref 3.4–5)
ALP SERPL-CCNC: 108 U/L (ref 40–150)
ALT SERPL W P-5'-P-CCNC: 14 U/L (ref 0–50)
ANION GAP SERPL CALCULATED.3IONS-SCNC: 4 MMOL/L (ref 3–14)
AST SERPL W P-5'-P-CCNC: 8 U/L (ref 0–45)
BASOPHILS # BLD AUTO: 0.1 10E9/L (ref 0–0.2)
BASOPHILS NFR BLD AUTO: 0.8 %
BILIRUB SERPL-MCNC: 0.3 MG/DL (ref 0.2–1.3)
BUN SERPL-MCNC: 10 MG/DL (ref 7–30)
CALCIUM SERPL-MCNC: 8.9 MG/DL (ref 8.5–10.1)
CHLORIDE SERPL-SCNC: 104 MMOL/L (ref 94–109)
CO2 SERPL-SCNC: 31 MMOL/L (ref 20–32)
CREAT SERPL-MCNC: 0.57 MG/DL (ref 0.52–1.04)
DIFFERENTIAL METHOD BLD: ABNORMAL
EOSINOPHIL # BLD AUTO: 0.2 10E9/L (ref 0–0.7)
EOSINOPHIL NFR BLD AUTO: 3.8 %
ERYTHROCYTE [DISTWIDTH] IN BLOOD BY AUTOMATED COUNT: 12.6 % (ref 10–15)
GFR SERPL CREATININE-BSD FRML MDRD: >90 ML/MIN/{1.73_M2}
GLUCOSE SERPL-MCNC: 98 MG/DL (ref 70–99)
HCT VFR BLD AUTO: 47.2 % (ref 35–47)
HGB BLD-MCNC: 14.7 G/DL (ref 11.7–15.7)
IMM GRANULOCYTES # BLD: 0 10E9/L (ref 0–0.4)
IMM GRANULOCYTES NFR BLD: 0.3 %
LYMPHOCYTES # BLD AUTO: 2.1 10E9/L (ref 0.8–5.3)
LYMPHOCYTES NFR BLD AUTO: 33.1 %
MCH RBC QN AUTO: 27.8 PG (ref 26.5–33)
MCHC RBC AUTO-ENTMCNC: 31.1 G/DL (ref 31.5–36.5)
MCV RBC AUTO: 89 FL (ref 78–100)
MONOCYTES # BLD AUTO: 0.6 10E9/L (ref 0–1.3)
MONOCYTES NFR BLD AUTO: 9.1 %
NEUTROPHILS # BLD AUTO: 3.3 10E9/L (ref 1.6–8.3)
NEUTROPHILS NFR BLD AUTO: 52.9 %
NRBC # BLD AUTO: 0 10*3/UL
NRBC BLD AUTO-RTO: 0 /100
PLATELET # BLD AUTO: 202 10E9/L (ref 150–450)
POTASSIUM SERPL-SCNC: 3.8 MMOL/L (ref 3.4–5.3)
PROT SERPL-MCNC: 7.3 G/DL (ref 6.8–8.8)
RBC # BLD AUTO: 5.29 10E12/L (ref 3.8–5.2)
SODIUM SERPL-SCNC: 140 MMOL/L (ref 133–144)
WBC # BLD AUTO: 6.3 10E9/L (ref 4–11)

## 2020-12-15 PROCEDURE — 85025 COMPLETE CBC W/AUTO DIFF WBC: CPT | Performed by: PATHOLOGY

## 2020-12-15 PROCEDURE — 94729 DIFFUSING CAPACITY: CPT

## 2020-12-15 PROCEDURE — 36415 COLL VENOUS BLD VENIPUNCTURE: CPT | Performed by: PATHOLOGY

## 2020-12-15 PROCEDURE — 94060 EVALUATION OF WHEEZING: CPT

## 2020-12-15 PROCEDURE — 99213 OFFICE O/P EST LOW 20 MIN: CPT | Mod: 95 | Performed by: NURSE PRACTITIONER

## 2020-12-15 PROCEDURE — 94618 PULMONARY STRESS TESTING: CPT

## 2020-12-15 PROCEDURE — 94726 PLETHYSMOGRAPHY LUNG VOLUMES: CPT

## 2020-12-15 PROCEDURE — 80053 COMPREHEN METABOLIC PANEL: CPT | Performed by: PATHOLOGY

## 2020-12-15 PROCEDURE — 71250 CT THORAX DX C-: CPT | Mod: GC | Performed by: RADIOLOGY

## 2020-12-15 RX ORDER — GLIPIZIDE 2.5 MG/1
2.5 TABLET, EXTENDED RELEASE ORAL DAILY
Qty: 60 TABLET | Refills: 3 | Status: SHIPPED | OUTPATIENT
Start: 2020-12-15 | End: 2022-01-27

## 2020-12-15 RX ORDER — DIPHENOXYLATE HCL/ATROPINE 2.5-.025MG
1 TABLET ORAL 4 TIMES DAILY PRN
Qty: 30 TABLET | Refills: 1 | Status: ON HOLD | OUTPATIENT
Start: 2020-12-15 | End: 2021-02-25

## 2020-12-15 RX ORDER — AMLODIPINE BESYLATE 10 MG/1
10 TABLET ORAL DAILY
Qty: 90 TABLET | Refills: 3 | Status: SHIPPED | OUTPATIENT
Start: 2020-12-15 | End: 2022-04-20

## 2020-12-15 RX ORDER — ALBUTEROL SULFATE 90 UG/1
AEROSOL, METERED RESPIRATORY (INHALATION)
Qty: 1 INHALER | Refills: 3 | Status: SHIPPED | OUTPATIENT
Start: 2020-12-15 | End: 2020-12-24

## 2020-12-15 NOTE — PROGRESS NOTES
"  Jenn Aparicio is a 65 year old female who is being evaluated via a billable telephone visit.      The patient has been notified of following:     \"This telephone visit will be conducted via a call between you and your physician/provider. We have found that certain health care needs can be provided without the need for a physical exam.  This service lets us provide the care you need with a short phone conversation.  If a prescription is necessary we can send it directly to your pharmacy.  If lab work is needed we can place an order for that and you can then stop by our lab to have the test done at a later time.    Telephone visits are billed at different rates depending on your insurance coverage. During this emergency period, for some insurers they may be billed the same as an in-person visit.  Please reach out to your insurance provider with any questions.    If during the course of the call the physician/provider feels a telephone visit is not appropriate, you will not be charged for this service.\"    Patient has given verbal consent for Telephone visit?  Yes    What phone number would you like to be contacted at? [phone]    How would you like to obtain your AVS? MyChart    Subjective     Jenn Aparicio is a 65 year old female who presents via phone visit today for the following health issues:    HPI      Patient Active Problem List   Diagnosis     Vaginitis     Postmenopausal bleeding     Cervical stenosis (uterine cervix)     Adenocarcinoma of right lung (H)     Pulmonary emphysema (H)     Diabetes mellitus, type 2 (H)     Primary lung adenocarcinoma (H) STAGE: IA2 [id number] poorly diff adeno RLL, then [id number] IA2 suspected NSCLC RUL, medically inoperable    .    Jenn feels very fatigued with any effort.  Her O2 sats have been occasionally down in the 70s.  She is asking for refills for her inhalers as well as a portable oxygen tank so she camn be more mobile, ie; go to grocery store.     Her blood glucose " levels have been ok.  Yesterday fasting glucose was 77.     She has been having diarrhea.  Yesterday she had 2 episodes.  The day prior 3-4 times a day. She is wondering if it is from one of her medications.      She is now ready to reach out for assistance from our PharmD smoking cessation counseling.      Current Outpatient Medications   Medication     albuterol (PROAIR HFA/PROVENTIL HFA/VENTOLIN HFA) 108 (90 Base) MCG/ACT inhaler     Alpha-Lipoic Acid 300 MG CAPS     amLODIPine (NORVASC) 10 MG tablet     Aspirin (ASPIR-81 PO)     diphenoxylate-atropine (LOMOTIL) 2.5-0.025 MG tablet     glipiZIDE (GLUCOTROL XL) 2.5 MG 24 hr tablet     mometasone-formoterol (DULERA) 200-5 MCG/ACT inhaler     umeclidinium (INCRUSE ELLIPTA) 62.5 MCG/INH inhaler     albuterol (PROVENTIL) (2.5 MG/3ML) 0.083% neb solution     predniSONE (DELTASONE) 20 MG tablet     No current facility-administered medications for this visit.      Immunization History   Administered Date(s) Administered     HepA-Adult 12/18/2007     HepB-Adult 06/18/2002, 07/19/2002     Influenza, Quad, High Dose, Pf, 65yr + 10/01/2020     Pneumo Conj 13-V (2010&after) 10/24/2019     Pneumococcal 23 valent 10/29/2013     Tdap (Adacel,Boostrix) 02/12/2015       Review of Systems   See above.       Objective      Vitals:  No vitals were obtained today due to virtual visit.  Alert.  PSYCH: Alert and oriented times 3; coherent speech, normal   rate and volume.  Her affect is normal  RESP: No cough, no audible wheezing, able to talk in full sentences  Remainder of exam unable to be completed due to telephone visits        Assessment/Plan:  Jenn was seen today for recheck medication.    Diagnoses and all orders for this visit:    Diarrhea, unspecified type  -     diphenoxylate-atropine (LOMOTIL) 2.5-0.025 MG tablet; Take 1 tablet by mouth 4 times daily as needed for diarrhea    Primary adenocarcinoma of lung, unspecified laterality (H)    Adenocarcinoma of lung, unspecified  laterality (H)  -     mometasone-formoterol (DULERA) 200-5 MCG/ACT inhaler; Inhale 2 puffs 2 times a day prn.  -     albuterol (PROAIR HFA/PROVENTIL HFA/VENTOLIN HFA) 108 (90 Base) MCG/ACT inhaler; INHALE 1 OR 2 PUFFS BY MOUTH EVERY 4 HOURS AS NEEDED    Essential hypertension  -     amLODIPine (NORVASC) 10 MG tablet; Take 1 tablet (10 mg) by mouth daily    Type 2 diabetes mellitus with stage 3 chronic kidney disease, without long-term current use of insulin, unspecified whether stage 3a or 3b CKD (H)  -     glipiZIDE (GLUCOTROL XL) 2.5 MG 24 hr tablet; Take 1 tablet (2.5 mg) by mouth daily    Other orders  -     MED THERAPY MANAGE REFERRAL        Phone call duration:  19 minutes      Mitzi LABOY, CNP

## 2020-12-16 ENCOUNTER — TELEPHONE (OUTPATIENT)
Dept: INTERNAL MEDICINE | Facility: CLINIC | Age: 65
End: 2020-12-16

## 2020-12-16 DIAGNOSIS — J44.9 COPD (CHRONIC OBSTRUCTIVE PULMONARY DISEASE) (H): ICD-10-CM

## 2020-12-16 DIAGNOSIS — C34.90 LUNG CANCER (H): Primary | ICD-10-CM

## 2020-12-16 LAB
DLCOCOR-%PRED-PRE: 37 %
DLCOCOR-PRE: 7.85 ML/MIN/MMHG
DLCOUNC-%PRED-PRE: 38 %
DLCOUNC-PRE: 8.15 ML/MIN/MMHG
DLCOUNC-PRED: 21.2 ML/MIN/MMHG
ERV-%PRED-PRE: 133 %
ERV-PRE: 0.69 L
ERV-PRED: 0.51 L
EXPTIME-PRE: 6.34 SEC
FEF2575-%PRED-POST: 19 %
FEF2575-%PRED-PRE: 11 %
FEF2575-POST: 0.39 L/SEC
FEF2575-PRE: 0.24 L/SEC
FEF2575-PRED: 2.04 L/SEC
FEFMAX-%PRED-PRE: 16 %
FEFMAX-PRE: 1.05 L/SEC
FEFMAX-PRED: 6.31 L/SEC
FEV1-%PRED-PRE: 24 %
FEV1-PRE: 0.57 L
FEV1FEV6-PRE: 44 %
FEV1FEV6-PRED: 80 %
FEV1FVC-PRE: 44 %
FEV1FVC-PRED: 79 %
FEV1SVC-PRE: 36 %
FEV1SVC-PRED: 68 %
FIFMAX-PRE: 1.18 L/SEC
FRCPLETH-%PRED-PRE: 279 %
FRCPLETH-PRE: 7.89 L
FRCPLETH-PRED: 2.82 L
FVC-%PRED-PRE: 44 %
FVC-PRE: 1.32 L
FVC-PRED: 2.97 L
IC-%PRED-PRE: 30 %
IC-PRE: 0.89 L
IC-PRED: 2.91 L
RVPLETH-%PRED-PRE: 347 %
RVPLETH-PRE: 7.21 L
RVPLETH-PRED: 2.07 L
TLCPLETH-%PRED-PRE: 166 %
TLCPLETH-PRE: 8.78 L
TLCPLETH-PRED: 5.27 L
VA-%PRED-PRE: 59 %
VA-PRE: 3.05 L
VC-%PRED-PRE: 46 %
VC-PRE: 1.57 L
VC-PRED: 3.42 L

## 2020-12-16 NOTE — TELEPHONE ENCOUNTER
Portable oxygen order was faxed to [facility] respiratory.    [facility]  ----------------------------------------------------------------------------------------      ----- Message from BENOIT Razo CNP sent at 12/15/2020  8:26 PM CST -----  Regarding: portable O2  [facility],    Can we work on trying to get Ms. Aparicio some portable O2.  All she has is the large O2 tanks that she has to drag behind her. She has been going through [facility].  Mitzi LABOY, CNP

## 2020-12-18 ENCOUNTER — PATIENT OUTREACH (OUTPATIENT)
Dept: ONCOLOGY | Facility: CLINIC | Age: 65
End: 2020-12-18

## 2020-12-18 NOTE — PROGRESS NOTES
RN Care Coordination Note  Outgoing Call:   Placed call to patient to inform Dr Leung has reviewed recent CT and everything is stable. Recommends continued CTs every 3 months. Unable to reach patient, left detailed message with above info. Asked for return call with any questions.      Bernadette Garcias RN, BSN, OCN   RN Care Coordinator   [facility]

## 2020-12-22 NOTE — PROGRESS NOTES
Pre-Charting  Jenn Aparicio is a 66 yo female with PMH of Very Severe COPD (FEV1 24%) on 2L O2***, RLL IA2 [id number] adenocarcinoma s/p lobectomy 2/2016 and suspected RUL [id number] IA2 NSCLC s/p SBRT 1/2020, Tobacco use, DMII, and HTN who presents for evaluation of COPD.    Oncology History: Taken from Oncology notes and chart review:  11/2015 - SOB, CT with medial RLL nodule  12/2015 - PET scan - Hypermetabolic RLL, no evidence of mets  1/2016 - Biopsy favoring adenocarcinoma  2/2016 - RLL lobectomy, pathology confirmed poorly differentiated adeno  10/2019 - Admission for acute respiratory failure 2/2 COPD exacerbation with new RUL nodule on CT  11/2019 PET - Hypermetabolic RUL nodule, no evidence of mets  12/2019 - Not surgical/biopsy candidate given emphysema, poor lung function  1/14/2020 - 1/28/2020 Empiric SBRT    [place] [facility] [place]. In Aug 2020 transferred care to [facility]. Follows in Oncology clinic with Dr. Leung. Established with Primary Care in September who continued inhaler regimen of Dulera, Incruse, and albuterol. At most recent Oncology visit 12/8/20 reported increase in SOB, lower oxygen sats, and cough. Most recent CT for cancer surveillance stable. Referred to Pulmonary for continued care of COPD.     Symptoms - baseline and exacerbation   Cardiac symptoms  Meds - inhaler use  Oxygen - use, equipment  Smk - amount, cessation?, prior efforts  Sinus, Reflux, Sleep  Home/Occup - other exposures    Assessment and Recommendations

## 2020-12-23 ENCOUNTER — VIRTUAL VISIT (OUTPATIENT)
Dept: PULMONOLOGY | Facility: CLINIC | Age: 65
End: 2020-12-23
Attending: INTERNAL MEDICINE
Payer: COMMERCIAL

## 2020-12-23 ENCOUNTER — PRE VISIT (OUTPATIENT)
Dept: PULMONOLOGY | Facility: CLINIC | Age: 65
End: 2020-12-23

## 2020-12-23 DIAGNOSIS — C34.90 ADENOCARCINOMA OF LUNG, UNSPECIFIED LATERALITY (H): ICD-10-CM

## 2020-12-23 DIAGNOSIS — J44.1 CHRONIC OBSTRUCTIVE PULMONARY DISEASE WITH ACUTE EXACERBATION (H): Primary | ICD-10-CM

## 2020-12-23 PROCEDURE — 99215 OFFICE O/P EST HI 40 MIN: CPT | Mod: 95 | Performed by: STUDENT IN AN ORGANIZED HEALTH CARE EDUCATION/TRAINING PROGRAM

## 2020-12-23 NOTE — LETTER
"    12/23/2020         RE: Jenn Aparicio  [address]  [place] [address]        Dear Colleague,    Thank you for referring your patient, Jenn Aparicio, to the [facility] LUNG SCIENCE AND [facility]. Please see a copy of my visit note below.    Jenn Aparicio is a 65 year old female who is being evaluated via a billable telephone visit.      The patient has been notified of following:     \"This telephone visit will be conducted via a call between you and your physician/provider. We have found that certain health care needs can be provided without the need for a physical exam.  This service lets us provide the care you need with a short phone conversation.  If a prescription is necessary we can send it directly to your pharmacy.  If lab work is needed we can place an order for that and you can then stop by our lab to have the test done at a later time.    Telephone visits are billed at different rates depending on your insurance coverage. During this emergency period, for some insurers they may be billed the same as an in-person visit.  Please reach out to your insurance provider with any questions.    If during the course of the call the physician/provider feels a telephone visit is not appropriate, you will not be charged for this service.\"    Patient has given verbal consent for Telephone visit?  Yes    What phone number would you like to be contacted at? [phone]    How would you like to obtain your AVS? Mail a copy    Phone call duration: 40 minutes    Jenn Aparicio is a 66 yo female with PMH of Very Severe COPD (FEV1 24%) on 2L O2, RLL IA2 [id number] adenocarcinoma s/p lobectomy 2/2016 and suspected RUL [id number] IA2 NSCLC s/p SBRT 1/2020, Tobacco use, DMII, and HTN who presents for evaluation of COPD.    Oncology History: Taken from Oncology notes and chart review:  11/2015 - SOB, CT with medial RLL nodule  12/2015 - PET scan - Hypermetabolic RLL, no mets  1/2016 - Biopsy " "favoring adenocarcinoma  2/2016 - RLL lobectomy, pathology confirmed poorly differentiated adeno  10/2019 - Admission for acute respiratory failure 2/2 COPD exacerbation with new RUL nodule on CT  11/2019 PET - Hypermetabolic RUL nodule, no mets  12/2019 - Not surgical/biopsy candidate given emphysema, poor lung function  1/14/2020 - 1/28/2020 Empiric SBRT    Previously followed at [facility] [place] with [place]. Reed. In Aug 2020 transferred care to [facility]. Follows in Oncology clinic with Dr. Leung. Established with Primary Care in September who continued inhaler regimen of Dulera, Incruse, and albuterol. At most recent Oncology visit 12/8/20 reported increase in SOB, lower oxygen sats, and cough. Most recent CT for cancer surveillance stable. Referred to Pulmonary for continued care of COPD.     Today reports some improvement following several days of steroids from Oncology. Breathing is better and phlegm went from yellow back to more typical thick clear/white. Does get a pleuritic cramp or spasm like feeling in the upper right chest that discussed could be due to prior radiation. No fevers or chills. Feels weak especially in the arms and legs, and oxygen helps. Dyspnea when carrying items, eating, and especially drinking. Has to use oxygen tanks when goes out that makes things more difficulty and would like to get a concentrator.     Reviewed inhalers, has not been taking Dulera for a while as reports never got her refill. Remains on Incruse and using albuterol regularly. When not feeling well will do nebs every 2-4 hours but says this makes her jittery. Denies lower leg swelling, does say she drinks a lot of fluid, does not track weights. Sleeps with head up on 3-4 pillows, on 2L during night. Says her children sometimes hear \"crackling\" from her breathing while she is asleep.     Reports not really smoking much and would like to quit. Asked if tried to quit before or ever used nicotine replacement. States was on Wellbutrin " "before that maybe helped but made her feel \"too calm.\" Does live in a building where lots of other people smoke. Suspects there is mold in the building. Does use an air purifier and has to empty the filters weekly because they get so dirty. Worked in teaching and as a nursing assistant. Had a brother who smoked and also developed lung cancer.        Assessment and Recommendations  Chronic Hypoxic Respiratory Failure  Very Severe COPD  Significant underlying disease with recent exacerbation likely brought on by lack of controller medications, with smoke and environmental exposures. Discussed priority of getting back on triple inhaler regimen which should make her feel better and decrease reliance on nebs. Definitely improved with mini steroid burst and will give additional burst while trying to get inhaler therapy restarted. Clinic will help inquire about oxygen therapy options. Short term follow up to make sure improving after steroids and back on inhalers.  - Clinic will look into Dulera prescription, reorder if necessary  - Additional five days of prednisone for recent exacerbation  - Refill of albuterol and Duonebs  - Will contact oxygen company about obtaining small concentrator  - Follow up in 1-2 months, will continue discussion of maintenance strategies (Sleep, AWC) and smoking cessation    Patient discussed with Dr. Ev Spann MD PhD  Pulmonary Critical Care Fellow  [phone]      Attending note:  Patient seen, examined and discussed with Dr. Spann. All data reviewed. Agree with the assessment and plan as outlined in the above note.  Severe COPD, not on optimal therapy.  Discussed options for getting back on triple inhaler therapy and supplemental oxygen.  Needs to work on smoking cessation.    Lorie Carreno MD  [phone]          Again, thank you for allowing me to participate in the care of your patient.        Sincerely,        Lynda Spann MD    "

## 2020-12-23 NOTE — PROGRESS NOTES
"Jenn Aparicio is a 65 year old female who is being evaluated via a billable telephone visit.      The patient has been notified of following:     \"This telephone visit will be conducted via a call between you and your physician/provider. We have found that certain health care needs can be provided without the need for a physical exam.  This service lets us provide the care you need with a short phone conversation.  If a prescription is necessary we can send it directly to your pharmacy.  If lab work is needed we can place an order for that and you can then stop by our lab to have the test done at a later time.    Telephone visits are billed at different rates depending on your insurance coverage. During this emergency period, for some insurers they may be billed the same as an in-person visit.  Please reach out to your insurance provider with any questions.    If during the course of the call the physician/provider feels a telephone visit is not appropriate, you will not be charged for this service.\"    Patient has given verbal consent for Telephone visit?  Yes    What phone number would you like to be contacted at? [phone]    How would you like to obtain your AVS? Mail a copy    Phone call duration: 40 minutes    Jenn Aparicio is a 64 yo female with PMH of Very Severe COPD (FEV1 24%) on 2L O2, RLL IA2 [id number] adenocarcinoma s/p lobectomy 2/2016 and suspected RUL [id number] IA2 NSCLC s/p SBRT 1/2020, Tobacco use, DMII, and HTN who presents for evaluation of COPD.    Oncology History: Taken from Oncology notes and chart review:  11/2015 - SOB, CT with medial RLL nodule  12/2015 - PET scan - Hypermetabolic RLL, no mets  1/2016 - Biopsy favoring adenocarcinoma  2/2016 - RLL lobectomy, pathology confirmed poorly differentiated adeno  10/2019 - Admission for acute respiratory failure 2/2 COPD exacerbation with new RUL nodule on CT  11/2019 PET - Hypermetabolic RUL nodule, no mets  12/2019 - Not surgical/biopsy candidate " "given emphysema, poor lung function  1/14/2020 - 1/28/2020 Empiric SBRT    Previously followed at [facility] [place] with Dr. Reed. In Aug 2020 transferred care to [facility]. Follows in Oncology clinic with Dr. Leung. Established with Primary Care in September who continued inhaler regimen of Dulera, Incruse, and albuterol. At most recent Oncology visit 12/8/20 reported increase in SOB, lower oxygen sats, and cough. Most recent CT for cancer surveillance stable. Referred to Pulmonary for continued care of COPD.     Today reports some improvement following several days of steroids from Oncology. Breathing is better and phlegm went from yellow back to more typical thick clear/white. Does get a pleuritic cramp or spasm like feeling in the upper right chest that discussed could be due to prior radiation. No fevers or chills. Feels weak especially in the arms and legs, and oxygen helps. Dyspnea when carrying items, eating, and especially drinking. Has to use oxygen tanks when goes out that makes things more difficulty and would like to get a concentrator.     Reviewed inhalers, has not been taking Dulera for a while as reports never got her refill. Remains on Incruse and using albuterol regularly. When not feeling well will do nebs every 2-4 hours but says this makes her jittery. Denies lower leg swelling, does say she drinks a lot of fluid, does not track weights. Sleeps with head up on 3-4 pillows, on 2L during night. Says her children sometimes hear \"crackling\" from her breathing while she is asleep.     Reports not really smoking much and would like to quit. Asked if tried to quit before or ever used nicotine replacement. States was on Wellbutrin before that maybe helped but made her feel \"too calm.\" Does live in a building where lots of other people smoke. Suspects there is mold in the building. Does use an air purifier and has to empty the filters weekly because they get so dirty. Worked in teaching and as a nursing assistant. " Had a brother who smoked and also developed lung cancer.        Assessment and Recommendations  Chronic Hypoxic Respiratory Failure  Very Severe COPD  Significant underlying disease with recent exacerbation likely brought on by lack of controller medications, with smoke and environmental exposures. Discussed priority of getting back on triple inhaler regimen which should make her feel better and decrease reliance on nebs. Definitely improved with mini steroid burst and will give additional burst while trying to get inhaler therapy restarted. Clinic will help inquire about oxygen therapy options. Short term follow up to make sure improving after steroids and back on inhalers.  - Clinic will look into Dulera prescription, reorder if necessary  - Additional five days of prednisone for recent exacerbation  - Refill of albuterol and Duonebs  - Will contact oxygen company about obtaining small concentrator  - Follow up in 1-2 months, will continue discussion of maintenance strategies (Sleep, AWC) and smoking cessation    Patient discussed with Dr. Ev Spann MD PhD  Pulmonary Critical Care Fellow  [phone]      Attending note:  Patient seen, examined and discussed with Dr. Spann. All data reviewed. Agree with the assessment and plan as outlined in the above note.  Severe COPD, not on optimal therapy.  Discussed options for getting back on triple inhaler therapy and supplemental oxygen.  Needs to work on smoking cessation.    Lorie Carreno MD  [phone]

## 2020-12-24 RX ORDER — ALBUTEROL SULFATE 90 UG/1
AEROSOL, METERED RESPIRATORY (INHALATION)
Qty: 1 INHALER | Refills: 3 | Status: SHIPPED | OUTPATIENT
Start: 2020-12-24 | End: 2021-02-03

## 2020-12-24 RX ORDER — IPRATROPIUM BROMIDE AND ALBUTEROL SULFATE 2.5; .5 MG/3ML; MG/3ML
1 SOLUTION RESPIRATORY (INHALATION) EVERY 6 HOURS PRN
Qty: 1 BOX | Refills: 4 | Status: SHIPPED | OUTPATIENT
Start: 2020-12-24 | End: 2021-02-03

## 2020-12-24 RX ORDER — PREDNISONE 20 MG/1
40 TABLET ORAL DAILY
Qty: 10 TABLET | Refills: 0 | Status: SHIPPED | OUTPATIENT
Start: 2020-12-24 | End: 2020-12-29

## 2020-12-28 ENCOUNTER — TELEPHONE (OUTPATIENT)
Dept: PULMONOLOGY | Facility: CLINIC | Age: 65
End: 2020-12-28

## 2020-12-28 DIAGNOSIS — C34.90 ADENOCARCINOMA OF LUNG, UNSPECIFIED LATERALITY (H): ICD-10-CM

## 2020-12-28 NOTE — PATIENT INSTRUCTIONS
Five days of prednisone to help your breathing and cough.     Clinic will look into your Dulera inhaler. Goal is to take Dulera and Incruse everyday with use of albuterol and Duonebs for symptoms as needed.    Clinic will call your oxygen company about the possibility of getting a small concentrator.    Follow up in Pulmonary clinic in 1-2 months

## 2020-12-28 NOTE — TELEPHONE ENCOUNTER
Contacted Jenn to follow up on office visit 12/23.    - Pt states she didn't receive Dulera ordered by Mitzi Nettles on 12/15.  Review of chart indicates it was sent to [facility].  Pt would like [facility]. Called [facility] to discontinue and sent to [facility].  - Pt has been advised by [facility] that they are unable to provide her with a POC. Pt unable to manage the weight of the small tanks that she's been given for portability and has difficulty with the dials.  Writer contacted [facility] who tell me that POC is heavier than tanks.  I have asked them to have an RT assess pt for the most practical delivery device for her. They will call me back with an update.

## 2020-12-30 ENCOUNTER — TELEPHONE (OUTPATIENT)
Dept: INTERNAL MEDICINE | Facility: CLINIC | Age: 65
End: 2020-12-30

## 2020-12-31 NOTE — TELEPHONE ENCOUNTER
Dulera is not covered and the suggested alternatives are :    Symbicort 80/4.5 mcg or 160/4.5 mcg  Breo Ellipta 100-25 mcg or 200-25 mcg      Please advise. Thank you.    Soon-Mi

## 2021-01-06 ENCOUNTER — TELEPHONE (OUTPATIENT)
Dept: ONCOLOGY | Facility: CLINIC | Age: 66
End: 2021-01-06

## 2021-01-06 NOTE — TELEPHONE ENCOUNTER
"Jenn(pt) called, reporting still feel short of breath.     Pt on phone call is struggling to breathe, has long pauses in between words, only able to answer yes/no questions.   Already tried inhaler, nebulizer every 4 hours, Symbicort, and Ellipta but does has not feel any new medications has been helping at all with breathing.      Pt was hoping to avoid ED and asking if anything else could try at home.       This writer explained that it could take a couple of hours time to page out provider and get a response/order any new medication and being that pt cannot use bathroom or checking pulse oximetry without \"feeling like is going to pass out\". Strongly recommended pt go to ED since patient struggling to breath during the phone conversation.   Jenn did not feel she could take herself to ED.   This writer offered to call 911 on Jenn's behalf.     Jenn stated, \"No will dial 911 when gets off phone\".     Routed to provider.                 "

## 2021-02-03 ENCOUNTER — TELEPHONE (OUTPATIENT)
Dept: PULMONOLOGY | Facility: CLINIC | Age: 66
End: 2021-02-03

## 2021-02-03 DIAGNOSIS — J44.1 CHRONIC OBSTRUCTIVE PULMONARY DISEASE WITH ACUTE EXACERBATION (H): ICD-10-CM

## 2021-02-03 DIAGNOSIS — C34.90 ADENOCARCINOMA OF LUNG, UNSPECIFIED LATERALITY (H): ICD-10-CM

## 2021-02-03 RX ORDER — ALBUTEROL SULFATE 90 UG/1
AEROSOL, METERED RESPIRATORY (INHALATION)
Qty: 1 INHALER | Refills: 10 | Status: SHIPPED | OUTPATIENT
Start: 2021-02-03 | End: 2021-04-28

## 2021-02-03 RX ORDER — IPRATROPIUM BROMIDE AND ALBUTEROL SULFATE 2.5; .5 MG/3ML; MG/3ML
1 SOLUTION RESPIRATORY (INHALATION) EVERY 6 HOURS PRN
Qty: 1080 ML | Refills: 10 | Status: ON HOLD | OUTPATIENT
Start: 2021-02-03 | End: 2021-02-26

## 2021-02-03 NOTE — TELEPHONE ENCOUNTER
Spoke to patient about changing her upcoming appt with Dr. Spann on 2/12 from in-person to virtual (video preferred, but telephone if unable to do video) for the same day and time due to changes in clinic protocol secondary to COVID-19 pandemic. She is agreeable to virtual visit and elected to do telephone call. Pt also had questions about refilling her medications and I have sent a message to the nurses. Details confirmed with patient.

## 2021-02-12 ENCOUNTER — VIRTUAL VISIT (OUTPATIENT)
Dept: PULMONOLOGY | Facility: CLINIC | Age: 66
End: 2021-02-12
Attending: INTERNAL MEDICINE
Payer: COMMERCIAL

## 2021-02-12 DIAGNOSIS — Z72.0 CURRENT TOBACCO USE: ICD-10-CM

## 2021-02-12 DIAGNOSIS — J44.1 CHRONIC OBSTRUCTIVE PULMONARY DISEASE WITH ACUTE EXACERBATION (H): Primary | ICD-10-CM

## 2021-02-12 PROCEDURE — 99214 OFFICE O/P EST MOD 30 MIN: CPT | Mod: 95 | Performed by: STUDENT IN AN ORGANIZED HEALTH CARE EDUCATION/TRAINING PROGRAM

## 2021-02-12 RX ORDER — SODIUM CHLORIDE FOR INHALATION 3 %
3 VIAL, NEBULIZER (ML) INHALATION DAILY
Qty: 250 ML | Refills: 3 | Status: SHIPPED | OUTPATIENT
Start: 2021-02-12 | End: 2022-04-20

## 2021-02-12 RX ORDER — NICOTINE 21 MG/24HR
1 PATCH, TRANSDERMAL 24 HOURS TRANSDERMAL EVERY 24 HOURS
Qty: 28 PATCH | Refills: 4 | Status: SHIPPED | OUTPATIENT
Start: 2021-02-12 | End: 2021-02-22

## 2021-02-12 NOTE — PROGRESS NOTES
Jenn is a 65 year old who is being evaluated via a billable telephone visit.      What phone number would you like to be contacted at? [phone]    How would you like to obtain your AVS? Mail a copy  Phone call duration: 30 minutes    Jenn Aparicio is a 64 yo female with PMH of Very Severe COPD (FEV1 24%) on 2L O2, RLL IA2 [id number] adenocarcinoma s/p lobectomy 2/2016 and suspected RUL [id number] IA2 NSCLC s/p SBRT 1/2020, Tobacco use, DMII, and HTN who presents for follow up of COPD.    [place] [facility] [place] Established with Pulmonary in December after transferring Oncology care to [facility]. At time of first visit had just completed short course of steroids for a COPD exacerbation. Had been off ICS-LABA for a while but was still taking LAMA and albuterol. Still smoking a little and around a lot of SHS in her building. Plan made to do additional steroid burst, restart ICS-LABA, and look into best options for home oxygen use as struggling with the tanks had at home.     Today sounded better on the phone without the ongoing cough of last visit. Voice still weak and reports continued feeling of mucus in her chest. The cold weather has also made it hard to breathe and not going outside. Once O2 sats were below 80 but states was afraid to use too much oxygen so hasn't increased the amount beyond her usual 2L. Using her big concentrator in her apartment but says oxygen tanks that are supposed to be portable are too heavy. Between cold and oxygen needs can't really leave apartment so hasn't been going out to smoke. As discussed at last visit thinks the air in her apartment and building is not good. Worries there may be mold, air purifier which just broke.     Oncology History:   Taken from Oncology notes and chart review:  11/2015 - SOB, CT with medial RLL nodule  12/2015 - PET scan - Hypermetabolic RLL, no mets  1/2016 - Biopsy favoring adenocarcinoma  2/2016 - RLL lobectomy, pathology confirmed poorly differentiated  adeno  10/2019 - Admission for acute respiratory failure 2/2 COPD exacerbation with new RUL nodule on CT  11/2019 PET - Hypermetabolic RUL nodule, no mets  12/2019 - Not surgical/biopsy candidate given emphysema, poor lung function  1/14/2020 - 1/28/2020 Empiric SBRT    Assessment and Recommendations  Chronic Hypoxic Respiratory Failure  Very Severe COPD  Significant underlying disease with ongoing poor control due to lack of controller medications, smoke, and environmental exposures. Still not on optimal inhaler therapy. Was not able to start Dulera due to insurance, will switch to Breo which is approved. Mucus and congestion remains a primary symptom and will increase airway clearance regimen with hypertonic nebs and Aerobika    Reports sats have gone as low as 70s during times of activity and counseled that ok to increase oxygen during activity if needed. Continues to struggle with oxygen support devices. Has large concentrator at home but unable to manage her oxygen tanks in order to leave her apartment. Will contact oxygen [employer] about alternative options. Fortunately not able to smoke in her apartment therefore not really smoking right now. Will start nicotine replacement to help her try to remain off cigarettes. Did  on expected increase in symptoms just after quitting.     - Continue Incruse, start Breo   - Continue Duonebs, will add hypertonic saline and Aerobika   - Nicotine patch and continued efforts at smoking cessation  - Increase oxygen during activity, use sats to guide oxygen level  - Clinic to communication with oxygen company on devices for use outside of home  - Replace home air purifier    Follow up in 3 months     Patient discussed with Dr. Jacey Spann MD PhD  Pulmonary Critical Care Fellow  [phone]

## 2021-02-12 NOTE — PROGRESS NOTES
Pre-Charting  Jenn Aparicio is a 64 yo female with PMH of Very Severe COPD (FEV1 24%) on 2L O2, RLL IA2 [id number] adenocarcinoma s/p lobectomy 2/2016 and suspected RUL [id number] IA2 NSCLC s/p SBRT 1/2020, Tobacco use, DMII, and HTN who presents for follow up of COPD.    Previously followed at [facility] [place]. Established with Pulmonary in December after transferring Oncology care to [facility]. At time of first visit had just completed short course of steroids for a COPD exacerbation. Had been off ICS-LABA for a while but was still taking LAMA and albuterol. Still smoking a little and around a lot of SHS in her building. Plan made to do additional steroid burst, restart ICS-LABA, and look into best options for home oxygen use as struggling with the tanks had at home.     Symptoms   SOB, cough, wheeze, phlegm   Chest pain, pressure   Orthopnea, edema  Alessio event    Medications   Inhalers - regimen, nebs   Recent steroids    Activity Level   Exercise capacity, time to SOB   Weekly amount     GERD, Sinus    Sleep   Snoring, apnea   Sleep testing, CPAP    Smoking      Oncology History:   Taken from Oncology notes and chart review:  11/2015 - SOB, CT with medial RLL nodule  12/2015 - PET scan - Hypermetabolic RLL, no mets  1/2016 - Biopsy favoring adenocarcinoma  2/2016 - RLL lobectomy, pathology confirmed poorly differentiated adeno  10/2019 - Admission for acute respiratory failure 2/2 COPD exacerbation with new RUL nodule on CT  11/2019 PET - Hypermetabolic RUL nodule, no mets  12/2019 - Not surgical/biopsy candidate given emphysema, poor lung function  1/14/2020 - 1/28/2020 Empiric SBRT    Assessment and Recommendations  Chronic Hypoxic Respiratory Failure  Very Severe COPD  Significant underlying disease with recent exacerbation likely brought on by lack of controller medications, smoke and environmental exposures. Effort to get back on triple inhaler regimen after first visit with longer course of prednisone for exacerbation.       -  Follow up in ***     Patient discussed with  ***     Lynda Spann MD PhD  Pulmonary Critical Care Fellow  [phone]

## 2021-02-12 NOTE — LETTER
2/12/2021         RE: Jenn Aparicio  [address]  [place] [address]        Dear Colleague,    Thank you for referring your patient, Jenn Aparicio, to the [facility] FOR LUNG SCIENCE AND [facility] [place]. Please see a copy of my visit note below.    Jenn is a 65 year old who is being evaluated via a billable telephone visit.      What phone number would you like to be contacted at? [phone]    How would you like to obtain your AVS? Mail a copy  Phone call duration: 30 minutes    Jenn Aparicio is a 64 yo female with PMH of Very Severe COPD (FEV1 24%) on 2L O2, RLL IA2 [id number] adenocarcinoma s/p lobectomy 2/2016 and suspected RUL [id number] IA2 NSCLC s/p SBRT 1/2020, Tobacco use, DMII, and HTN who presents for follow up of COPD.    Previously followed at [facility]. Established with Pulmonary in December after transferring Oncology care to [facility]. At time of first visit had just completed short course of steroids for a COPD exacerbation. Had been off ICS-LABA for a while but was still taking LAMA and albuterol. Still smoking a little and around a lot of SHS in her building. Plan made to do additional steroid burst, restart ICS-LABA, and look into best options for home oxygen use as struggling with the tanks had at home.     Today sounded better on the phone without the ongoing cough of last visit. Voice still weak and reports continued feeling of mucus in her chest. The cold weather has also made it hard to breathe and not going outside. Once O2 sats were below 80 but states was afraid to use too much oxygen so hasn't increased the amount beyond her usual 2L. Using her big concentrator in her apartment but says oxygen tanks that are supposed to be portable are too heavy. Between cold and oxygen needs can't really leave apartment so hasn't been going out to smoke. As discussed at last visit thinks the air in her apartment and building is not good. Worries there may be mold, air purifier which  just broke.     Oncology History:   Taken from Oncology notes and chart review:  11/2015 - SOB, CT with medial RLL nodule  12/2015 - PET scan - Hypermetabolic RLL, no mets  1/2016 - Biopsy favoring adenocarcinoma  2/2016 - RLL lobectomy, pathology confirmed poorly differentiated adeno  10/2019 - Admission for acute respiratory failure 2/2 COPD exacerbation with new RUL nodule on CT  11/2019 PET - Hypermetabolic RUL nodule, no mets  12/2019 - Not surgical/biopsy candidate given emphysema, poor lung function  1/14/2020 - 1/28/2020 Empiric SBRT    Assessment and Recommendations  Chronic Hypoxic Respiratory Failure  Very Severe COPD  Significant underlying disease with ongoing poor control due to lack of controller medications, smoke, and environmental exposures. Still not on optimal inhaler therapy. Was not able to start Dulera due to insurance, will switch to Breo which is approved. Mucus and congestion remains a primary symptom and will increase airway clearance regimen with hypertonic nebs and Aerobika    Reports sats have gone as low as 70s during times of activity and counseled that ok to increase oxygen during activity if needed. Continues to struggle with oxygen support devices. Has large concentrator at home but unable to manage her oxygen tanks in order to leave her apartment. Will contact oxygen [employer] about alternative options. Fortunately not able to smoke in her apartment therefore not really smoking right now. Will start nicotine replacement to help her try to remain off cigarettes. Did  on expected increase in symptoms just after quitting.     - Continue Incruse, start Breo   - Continue Duonebs, will add hypertonic saline and Aerobika   - Nicotine patch and continued efforts at smoking cessation  - Increase oxygen during activity, use sats to guide oxygen level  - Clinic to communication with oxygen company on devices for use outside of home  - Replace home air purifier    Follow up in 3  months     Patient discussed with Dr. Jacey Spann MD PhD  Pulmonary Critical Care Fellow  [phone]    Attestation signed by Alex Mari MD at 2/17/2021 11:51 AM:  Attending statement:    The patient was interviewed by me as part of a virtual phone-based clinic visit with the pulmonary fellow, Dr Spann.  The case was discussed at length.  All pertinent available results from our visit were reviewed.  The note written by Dr Spann above reflects our joint assessment and plan.    Alex Mari MD

## 2021-02-13 NOTE — PATIENT INSTRUCTIONS
Continue using Incruse daily    Start using Breo daily once it arrives    Use your nicotine patch to help stay off the cigarettes, it is great that you are not smoking right now!    1-2 times per day use your Duonebs with your new 3% saline nebs and the Aerobika flutter device to help clear out your mucus    Youtube video on Aerobika use: https://www.youtube.com/watch?v=[email or web address]    It is ok to increase your oxygen when exerting yourself if your oxygen sats are dropping too low. Start with 3L when being active and see if your sats are ok, if they are still too low go up to 3.5 or 4L.    The clinic is looking into oxygen machine options for you.     I agree that you should get your air purifier replaced.

## 2021-02-17 ENCOUNTER — TELEPHONE (OUTPATIENT)
Dept: PULMONOLOGY | Facility: CLINIC | Age: 66
End: 2021-02-17

## 2021-02-22 ENCOUNTER — TELEPHONE (OUTPATIENT)
Dept: PULMONOLOGY | Facility: CLINIC | Age: 66
End: 2021-02-22

## 2021-02-22 DIAGNOSIS — J42 CHRONIC BRONCHITIS, UNSPECIFIED CHRONIC BRONCHITIS TYPE (H): ICD-10-CM

## 2021-02-22 DIAGNOSIS — J44.1 CHRONIC OBSTRUCTIVE PULMONARY DISEASE WITH ACUTE EXACERBATION (H): ICD-10-CM

## 2021-02-22 DIAGNOSIS — Z72.0 CURRENT TOBACCO USE: ICD-10-CM

## 2021-02-22 DIAGNOSIS — J44.1 CHRONIC OBSTRUCTIVE PULMONARY DISEASE WITH ACUTE EXACERBATION (H): Primary | ICD-10-CM

## 2021-02-22 RX ORDER — AZITHROMYCIN 250 MG/1
TABLET, FILM COATED ORAL
Qty: 6 TABLET | Refills: 0 | Status: ON HOLD | OUTPATIENT
Start: 2021-02-22 | End: 2021-02-25

## 2021-02-22 RX ORDER — NICOTINE 21 MG/24HR
1 PATCH, TRANSDERMAL 24 HOURS TRANSDERMAL EVERY 24 HOURS
Qty: 84 PATCH | Refills: 1 | Status: SHIPPED | OUTPATIENT
Start: 2021-02-22 | End: 2021-03-02

## 2021-02-22 RX ORDER — PREDNISONE 20 MG/1
40 TABLET ORAL DAILY
Qty: 10 TABLET | Refills: 0 | Status: ON HOLD | OUTPATIENT
Start: 2021-02-22 | End: 2021-02-25

## 2021-02-22 NOTE — TELEPHONE ENCOUNTER
"RN just spoke with patient to discuss prescription refill and she states she isn't feeling well. She says her lungs \"feel full of fluid\" and that she is coughing up yellowish sputum. She denies fever but states she has had increased cough,increased dyspnea, chills and night sweats. She has been using her prescribed medication (Breo) and noted that in the past, prednisone has helped her. RN encouraged patient to seek emergency care should her symptoms become markedly worse. Patient agreed and verbalized understanding. RN will send this information to provider to advise.  "

## 2021-02-22 NOTE — TELEPHONE ENCOUNTER
It appears refills were sent to wrong pharmacy. RN resent RX to correct pharmacy and notified patient.

## 2021-02-22 NOTE — TELEPHONE ENCOUNTER
[facility] Call Center    Phone Message    May a detailed message be left on voicemail: yes     Reason for Call: Other: Jenn called in stating that she never received her medications that were prescribed by Dr. Spann at last apt on 2/12/21, mentioning the fluticasone-vilanterol (BREO ELLIPTA) 200-25 MCG/INH inhaler in specific. She reports calling the [facility] pharmacy that her medications were sent to and they told her they did not have the rx. Please call her back to discuss.    Action Taken: Message routed to:  [facility]    Travel Screening: Not Applicable

## 2021-02-23 ENCOUNTER — APPOINTMENT (OUTPATIENT)
Dept: GENERAL RADIOLOGY | Facility: CLINIC | Age: 66
DRG: 190 | End: 2021-02-23
Attending: EMERGENCY MEDICINE
Payer: COMMERCIAL

## 2021-02-23 ENCOUNTER — TELEPHONE (OUTPATIENT)
Dept: PULMONOLOGY | Facility: CLINIC | Age: 66
End: 2021-02-23

## 2021-02-23 ENCOUNTER — HOSPITAL ENCOUNTER (INPATIENT)
Facility: CLINIC | Age: 66
LOS: 2 days | Discharge: HOME HEALTH | DRG: 190 | End: 2021-02-26
Attending: EMERGENCY MEDICINE | Admitting: EMERGENCY MEDICINE
Payer: COMMERCIAL

## 2021-02-23 DIAGNOSIS — J44.9 CHRONIC OBSTRUCTIVE PULMONARY DISEASE, UNSPECIFIED COPD TYPE (H): ICD-10-CM

## 2021-02-23 DIAGNOSIS — J44.1 COPD WITH ACUTE EXACERBATION (H): ICD-10-CM

## 2021-02-23 DIAGNOSIS — F17.210 CIGARETTE SMOKER: ICD-10-CM

## 2021-02-23 DIAGNOSIS — R07.1 CHEST PAIN ON BREATHING: ICD-10-CM

## 2021-02-23 DIAGNOSIS — C34.90 ADENOCARCINOMA OF LUNG, UNSPECIFIED LATERALITY (H): ICD-10-CM

## 2021-02-23 DIAGNOSIS — C34.31 PRIMARY MALIGNANT NEOPLASM OF RIGHT LOWER LOBE OF LUNG (H): ICD-10-CM

## 2021-02-23 DIAGNOSIS — Z20.822 CONTACT WITH AND (SUSPECTED) EXPOSURE TO COVID-19: ICD-10-CM

## 2021-02-23 DIAGNOSIS — J44.1 COPD EXACERBATION (H): Primary | ICD-10-CM

## 2021-02-23 DIAGNOSIS — E87.20 ACIDOSIS: ICD-10-CM

## 2021-02-23 DIAGNOSIS — N18.30 STAGE 3 CHRONIC KIDNEY DISEASE, UNSPECIFIED WHETHER STAGE 3A OR 3B CKD (H): ICD-10-CM

## 2021-02-23 DIAGNOSIS — Z79.4 TYPE 2 DIABETES MELLITUS WITHOUT COMPLICATION, WITH LONG-TERM CURRENT USE OF INSULIN (H): ICD-10-CM

## 2021-02-23 DIAGNOSIS — J96.21 ACUTE ON CHRONIC RESPIRATORY FAILURE WITH HYPOXIA (H): ICD-10-CM

## 2021-02-23 DIAGNOSIS — I12.9 BENIGN HYPERTENSIVE KIDNEY DISEASE WITH CHRONIC KIDNEY DISEASE STAGE I THROUGH STAGE IV, OR UNSPECIFIED(403.10): ICD-10-CM

## 2021-02-23 DIAGNOSIS — E11.9 TYPE 2 DIABETES MELLITUS WITHOUT COMPLICATION, WITH LONG-TERM CURRENT USE OF INSULIN (H): ICD-10-CM

## 2021-02-23 DIAGNOSIS — J44.1 CHRONIC OBSTRUCTIVE PULMONARY DISEASE WITH ACUTE EXACERBATION (H): ICD-10-CM

## 2021-02-23 LAB
ALBUMIN SERPL-MCNC: 3.3 G/DL (ref 3.4–5)
ALP SERPL-CCNC: 114 U/L (ref 40–150)
ALT SERPL W P-5'-P-CCNC: 14 U/L (ref 0–50)
ANION GAP SERPL CALCULATED.3IONS-SCNC: 2 MMOL/L (ref 3–14)
AST SERPL W P-5'-P-CCNC: 11 U/L (ref 0–45)
BASOPHILS # BLD AUTO: 0 10E9/L (ref 0–0.2)
BASOPHILS NFR BLD AUTO: 0.5 %
BILIRUB SERPL-MCNC: 0.1 MG/DL (ref 0.2–1.3)
BUN SERPL-MCNC: 7 MG/DL (ref 7–30)
CALCIUM SERPL-MCNC: 8.7 MG/DL (ref 8.5–10.1)
CHLORIDE SERPL-SCNC: 105 MMOL/L (ref 94–109)
CO2 SERPL-SCNC: 34 MMOL/L (ref 20–32)
CREAT SERPL-MCNC: 0.66 MG/DL (ref 0.52–1.04)
D DIMER PPP FEU-MCNC: <0.3 UG/ML FEU (ref 0–0.5)
DIFFERENTIAL METHOD BLD: ABNORMAL
EOSINOPHIL # BLD AUTO: 0.4 10E9/L (ref 0–0.7)
EOSINOPHIL NFR BLD AUTO: 5.8 %
ERYTHROCYTE [DISTWIDTH] IN BLOOD BY AUTOMATED COUNT: 13.1 % (ref 10–15)
FLUAV RNA RESP QL NAA+PROBE: NEGATIVE
FLUBV RNA RESP QL NAA+PROBE: NEGATIVE
GFR SERPL CREATININE-BSD FRML MDRD: >90 ML/MIN/{1.73_M2}
GLUCOSE SERPL-MCNC: 110 MG/DL (ref 70–99)
HCT VFR BLD AUTO: 43.3 % (ref 35–47)
HGB BLD-MCNC: 13.5 G/DL (ref 11.7–15.7)
IMM GRANULOCYTES # BLD: 0 10E9/L (ref 0–0.4)
IMM GRANULOCYTES NFR BLD: 0.2 %
INR PPP: 1.02 (ref 0.86–1.14)
LABORATORY COMMENT REPORT: NORMAL
LYMPHOCYTES # BLD AUTO: 2.2 10E9/L (ref 0.8–5.3)
LYMPHOCYTES NFR BLD AUTO: 32.9 %
MCH RBC QN AUTO: 27.8 PG (ref 26.5–33)
MCHC RBC AUTO-ENTMCNC: 31.2 G/DL (ref 31.5–36.5)
MCV RBC AUTO: 89 FL (ref 78–100)
MONOCYTES # BLD AUTO: 0.7 10E9/L (ref 0–1.3)
MONOCYTES NFR BLD AUTO: 11.3 %
NEUTROPHILS # BLD AUTO: 3.2 10E9/L (ref 1.6–8.3)
NEUTROPHILS NFR BLD AUTO: 49.3 %
NRBC # BLD AUTO: 0 10*3/UL
NRBC BLD AUTO-RTO: 0 /100
NT-PROBNP SERPL-MCNC: 39 PG/ML (ref 0–900)
PLATELET # BLD AUTO: 211 10E9/L (ref 150–450)
POTASSIUM SERPL-SCNC: 3.5 MMOL/L (ref 3.4–5.3)
PROT SERPL-MCNC: 7 G/DL (ref 6.8–8.8)
RBC # BLD AUTO: 4.85 10E12/L (ref 3.8–5.2)
RSV RNA SPEC QL NAA+PROBE: NEGATIVE
SARS-COV-2 RNA RESP QL NAA+PROBE: NEGATIVE
SODIUM SERPL-SCNC: 141 MMOL/L (ref 133–144)
SPECIMEN SOURCE: NORMAL
TROPONIN I SERPL-MCNC: <0.015 UG/L (ref 0–0.04)
WBC # BLD AUTO: 6.5 10E9/L (ref 4–11)

## 2021-02-23 PROCEDURE — 99285 EMERGENCY DEPT VISIT HI MDM: CPT | Mod: 25 | Performed by: EMERGENCY MEDICINE

## 2021-02-23 PROCEDURE — 94640 AIRWAY INHALATION TREATMENT: CPT

## 2021-02-23 PROCEDURE — 71045 X-RAY EXAM CHEST 1 VIEW: CPT

## 2021-02-23 PROCEDURE — 80053 COMPREHEN METABOLIC PANEL: CPT | Performed by: EMERGENCY MEDICINE

## 2021-02-23 PROCEDURE — 93010 ELECTROCARDIOGRAM REPORT: CPT | Performed by: EMERGENCY MEDICINE

## 2021-02-23 PROCEDURE — 250N000009 HC RX 250: Performed by: EMERGENCY MEDICINE

## 2021-02-23 PROCEDURE — 85025 COMPLETE CBC W/AUTO DIFF WBC: CPT | Performed by: EMERGENCY MEDICINE

## 2021-02-23 PROCEDURE — 85610 PROTHROMBIN TIME: CPT | Performed by: EMERGENCY MEDICINE

## 2021-02-23 PROCEDURE — 99220 PR INITIAL OBSERVATION CARE,LEVEL III: CPT | Performed by: EMERGENCY MEDICINE

## 2021-02-23 PROCEDURE — 250N000011 HC RX IP 250 OP 636: Performed by: EMERGENCY MEDICINE

## 2021-02-23 PROCEDURE — 85379 FIBRIN DEGRADATION QUANT: CPT | Performed by: EMERGENCY MEDICINE

## 2021-02-23 PROCEDURE — 93005 ELECTROCARDIOGRAM TRACING: CPT | Performed by: EMERGENCY MEDICINE

## 2021-02-23 PROCEDURE — C9803 HOPD COVID-19 SPEC COLLECT: HCPCS | Performed by: EMERGENCY MEDICINE

## 2021-02-23 PROCEDURE — 87636 SARSCOV2 & INF A&B AMP PRB: CPT | Performed by: EMERGENCY MEDICINE

## 2021-02-23 PROCEDURE — 83880 ASSAY OF NATRIURETIC PEPTIDE: CPT | Performed by: EMERGENCY MEDICINE

## 2021-02-23 PROCEDURE — 71045 X-RAY EXAM CHEST 1 VIEW: CPT | Mod: 26 | Performed by: RADIOLOGY

## 2021-02-23 PROCEDURE — 96374 THER/PROPH/DIAG INJ IV PUSH: CPT | Performed by: EMERGENCY MEDICINE

## 2021-02-23 PROCEDURE — 84484 ASSAY OF TROPONIN QUANT: CPT | Performed by: EMERGENCY MEDICINE

## 2021-02-23 PROCEDURE — G0378 HOSPITAL OBSERVATION PER HR: HCPCS

## 2021-02-23 RX ORDER — METHYLPREDNISOLONE SODIUM SUCCINATE 125 MG/2ML
80 INJECTION, POWDER, LYOPHILIZED, FOR SOLUTION INTRAMUSCULAR; INTRAVENOUS ONCE
Status: COMPLETED | OUTPATIENT
Start: 2021-02-23 | End: 2021-02-23

## 2021-02-23 RX ORDER — IPRATROPIUM BROMIDE AND ALBUTEROL SULFATE 2.5; .5 MG/3ML; MG/3ML
3 SOLUTION RESPIRATORY (INHALATION) ONCE
Status: COMPLETED | OUTPATIENT
Start: 2021-02-23 | End: 2021-02-23

## 2021-02-23 RX ADMIN — METHYLPREDNISOLONE SODIUM SUCCINATE 81.25 MG: 125 INJECTION, POWDER, FOR SOLUTION INTRAMUSCULAR; INTRAVENOUS at 20:27

## 2021-02-23 RX ADMIN — IPRATROPIUM BROMIDE AND ALBUTEROL SULFATE 3 ML: .5; 3 SOLUTION RESPIRATORY (INHALATION) at 20:28

## 2021-02-23 RX ADMIN — IPRATROPIUM BROMIDE AND ALBUTEROL SULFATE 3 ML: .5; 3 SOLUTION RESPIRATORY (INHALATION) at 21:36

## 2021-02-23 ASSESSMENT — MIFFLIN-ST. JEOR: SCORE: 1490.25

## 2021-02-23 ASSESSMENT — ENCOUNTER SYMPTOMS
CHEST TIGHTNESS: 1
SHORTNESS OF BREATH: 1
FEVER: 0
COUGH: 1
WHEEZING: 1

## 2021-02-23 NOTE — TELEPHONE ENCOUNTER
"Called patient to discuss insurance denial of portable oxygen concentrator. Patient was very SOB and sounded quite weak. She states \"everything hurts\". RN encouraged patient to seek emergency care through the ED. Patient agreed. She will try to find a ride, but if she can't quickly, patient agrees to call 911 if her breathing is emergent. RN will send FYI message to provider to notify of patient symptoms.  "

## 2021-02-24 LAB
ALBUMIN UR-MCNC: 30 MG/DL
ANION GAP SERPL CALCULATED.3IONS-SCNC: 7 MMOL/L (ref 3–14)
APPEARANCE UR: CLEAR
BASE EXCESS BLDV CALC-SCNC: 3.9 MMOL/L
BILIRUB UR QL STRIP: NEGATIVE
BUN SERPL-MCNC: 12 MG/DL (ref 7–30)
CALCIUM SERPL-MCNC: 9.4 MG/DL (ref 8.5–10.1)
CHLORIDE SERPL-SCNC: 102 MMOL/L (ref 94–109)
CO2 SERPL-SCNC: 28 MMOL/L (ref 20–32)
COLOR UR AUTO: YELLOW
CREAT SERPL-MCNC: 0.55 MG/DL (ref 0.52–1.04)
CREAT SERPL-MCNC: 0.64 MG/DL (ref 0.52–1.04)
D DIMER PPP FEU-MCNC: 0.3 UG/ML FEU (ref 0–0.5)
D DIMER PPP FEU-MCNC: <0.3 UG/ML FEU (ref 0–0.5)
ERYTHROCYTE [DISTWIDTH] IN BLOOD BY AUTOMATED COUNT: 13.1 % (ref 10–15)
GFR SERPL CREATININE-BSD FRML MDRD: >90 ML/MIN/{1.73_M2}
GFR SERPL CREATININE-BSD FRML MDRD: >90 ML/MIN/{1.73_M2}
GLUCOSE BLDC GLUCOMTR-MCNC: 227 MG/DL (ref 70–99)
GLUCOSE BLDC GLUCOMTR-MCNC: 228 MG/DL (ref 70–99)
GLUCOSE BLDC GLUCOMTR-MCNC: 234 MG/DL (ref 70–99)
GLUCOSE BLDC GLUCOMTR-MCNC: 239 MG/DL (ref 70–99)
GLUCOSE BLDC GLUCOMTR-MCNC: 259 MG/DL (ref 70–99)
GLUCOSE BLDC GLUCOMTR-MCNC: 276 MG/DL (ref 70–99)
GLUCOSE SERPL-MCNC: 290 MG/DL (ref 70–99)
GLUCOSE UR STRIP-MCNC: >1000 MG/DL
HBA1C MFR BLD: 7 % (ref 0–5.6)
HCO3 BLDV-SCNC: 30 MMOL/L (ref 21–28)
HCT VFR BLD AUTO: 44.5 % (ref 35–47)
HGB BLD-MCNC: 13.7 G/DL (ref 11.7–15.7)
HGB UR QL STRIP: ABNORMAL
INTERPRETATION ECG - MUSE: NORMAL
KETONES UR STRIP-MCNC: 10 MG/DL
LACTATE BLD-SCNC: 3.7 MMOL/L (ref 0.7–2)
LACTATE BLD-SCNC: 4.4 MMOL/L (ref 0.7–2)
LACTATE BLD-SCNC: 4.7 MMOL/L (ref 0.7–2)
LACTATE BLD-SCNC: 5.3 MMOL/L (ref 0.7–2)
LEUKOCYTE ESTERASE UR QL STRIP: NEGATIVE
MAGNESIUM SERPL-MCNC: 1.8 MG/DL (ref 1.6–2.3)
MAGNESIUM SERPL-MCNC: 2 MG/DL (ref 1.6–2.3)
MCH RBC QN AUTO: 28.1 PG (ref 26.5–33)
MCHC RBC AUTO-ENTMCNC: 30.8 G/DL (ref 31.5–36.5)
MCV RBC AUTO: 91 FL (ref 78–100)
MUCOUS THREADS #/AREA URNS LPF: PRESENT /LPF
NITRATE UR QL: NEGATIVE
O2/TOTAL GAS SETTING VFR VENT: ABNORMAL %
OXYHGB MFR BLDV: 34 %
PCO2 BLDV: 50 MM HG (ref 40–50)
PH BLDV: 7.38 PH (ref 7.32–7.43)
PH UR STRIP: 5.5 PH (ref 5–7)
PHOSPHATE SERPL-MCNC: 2.4 MG/DL (ref 2.5–4.5)
PHOSPHATE SERPL-MCNC: 2.4 MG/DL (ref 2.5–4.5)
PLATELET # BLD AUTO: 196 10E9/L (ref 150–450)
PO2 BLDV: 22 MM HG (ref 25–47)
POTASSIUM SERPL-SCNC: 4.1 MMOL/L (ref 3.4–5.3)
PROCALCITONIN SERPL-MCNC: <0.05 NG/ML
PROCALCITONIN SERPL-MCNC: <0.05 NG/ML
RBC # BLD AUTO: 4.88 10E12/L (ref 3.8–5.2)
RBC #/AREA URNS AUTO: 1 /HPF (ref 0–2)
SODIUM SERPL-SCNC: 138 MMOL/L (ref 133–144)
SOURCE: ABNORMAL
SP GR UR STRIP: 1.03 (ref 1–1.03)
SQUAMOUS #/AREA URNS AUTO: 3 /HPF (ref 0–1)
TROPONIN I SERPL-MCNC: <0.015 UG/L (ref 0–0.04)
UROBILINOGEN UR STRIP-MCNC: NORMAL MG/DL (ref 0–2)
WBC # BLD AUTO: 7.8 10E9/L (ref 4–11)
WBC #/AREA URNS AUTO: 0 /HPF (ref 0–5)

## 2021-02-24 PROCEDURE — 250N000011 HC RX IP 250 OP 636: Performed by: STUDENT IN AN ORGANIZED HEALTH CARE EDUCATION/TRAINING PROGRAM

## 2021-02-24 PROCEDURE — 999N000033 HC STATISTIC CHRONIC PULMONARY DISEASE SPECIALIST

## 2021-02-24 PROCEDURE — 999N000032 HC STATISTIC CHRONIC DISEASE SPECIALIST RT CONSULT

## 2021-02-24 PROCEDURE — 94640 AIRWAY INHALATION TREATMENT: CPT

## 2021-02-24 PROCEDURE — 36415 COLL VENOUS BLD VENIPUNCTURE: CPT | Performed by: FAMILY MEDICINE

## 2021-02-24 PROCEDURE — G0378 HOSPITAL OBSERVATION PER HR: HCPCS

## 2021-02-24 PROCEDURE — 87040 BLOOD CULTURE FOR BACTERIA: CPT | Performed by: PHYSICIAN ASSISTANT

## 2021-02-24 PROCEDURE — 94640 AIRWAY INHALATION TREATMENT: CPT | Mod: 76

## 2021-02-24 PROCEDURE — 96361 HYDRATE IV INFUSION ADD-ON: CPT

## 2021-02-24 PROCEDURE — 36415 COLL VENOUS BLD VENIPUNCTURE: CPT | Performed by: PHYSICIAN ASSISTANT

## 2021-02-24 PROCEDURE — 93005 ELECTROCARDIOGRAM TRACING: CPT

## 2021-02-24 PROCEDURE — 85379 FIBRIN DEGRADATION QUANT: CPT | Performed by: STUDENT IN AN ORGANIZED HEALTH CARE EDUCATION/TRAINING PROGRAM

## 2021-02-24 PROCEDURE — 83735 ASSAY OF MAGNESIUM: CPT | Performed by: NURSE PRACTITIONER

## 2021-02-24 PROCEDURE — 250N000013 HC RX MED GY IP 250 OP 250 PS 637: Performed by: NURSE PRACTITIONER

## 2021-02-24 PROCEDURE — 250N000009 HC RX 250: Performed by: STUDENT IN AN ORGANIZED HEALTH CARE EDUCATION/TRAINING PROGRAM

## 2021-02-24 PROCEDURE — 84145 PROCALCITONIN (PCT): CPT | Performed by: NURSE PRACTITIONER

## 2021-02-24 PROCEDURE — 84100 ASSAY OF PHOSPHORUS: CPT | Performed by: NURSE PRACTITIONER

## 2021-02-24 PROCEDURE — 250N000009 HC RX 250: Performed by: NURSE PRACTITIONER

## 2021-02-24 PROCEDURE — 83605 ASSAY OF LACTIC ACID: CPT | Performed by: PHYSICIAN ASSISTANT

## 2021-02-24 PROCEDURE — 999N000157 HC STATISTIC RCP TIME EA 10 MIN

## 2021-02-24 PROCEDURE — 36415 COLL VENOUS BLD VENIPUNCTURE: CPT | Performed by: NURSE PRACTITIONER

## 2021-02-24 PROCEDURE — 96372 THER/PROPH/DIAG INJ SC/IM: CPT

## 2021-02-24 PROCEDURE — 94664 DEMO&/EVAL PT USE INHALER: CPT

## 2021-02-24 PROCEDURE — 250N000012 HC RX MED GY IP 250 OP 636 PS 637: Performed by: NURSE PRACTITIONER

## 2021-02-24 PROCEDURE — 83605 ASSAY OF LACTIC ACID: CPT | Performed by: STUDENT IN AN ORGANIZED HEALTH CARE EDUCATION/TRAINING PROGRAM

## 2021-02-24 PROCEDURE — 82805 BLOOD GASES W/O2 SATURATION: CPT | Performed by: STUDENT IN AN ORGANIZED HEALTH CARE EDUCATION/TRAINING PROGRAM

## 2021-02-24 PROCEDURE — G0463 HOSPITAL OUTPT CLINIC VISIT: HCPCS

## 2021-02-24 PROCEDURE — 99226 PR SUBSEQUENT OBSERVATION CARE,LEVEL III: CPT | Performed by: PHYSICIAN ASSISTANT

## 2021-02-24 PROCEDURE — 258N000003 HC RX IP 258 OP 636: Performed by: NURSE PRACTITIONER

## 2021-02-24 PROCEDURE — 250N000012 HC RX MED GY IP 250 OP 636 PS 637

## 2021-02-24 PROCEDURE — 87040 BLOOD CULTURE FOR BACTERIA: CPT | Performed by: NURSE PRACTITIONER

## 2021-02-24 PROCEDURE — 272N000202 HC AEROBIKA WITH MANOMETER

## 2021-02-24 PROCEDURE — 80048 BASIC METABOLIC PNL TOTAL CA: CPT | Performed by: NURSE PRACTITIONER

## 2021-02-24 PROCEDURE — 83605 ASSAY OF LACTIC ACID: CPT | Performed by: EMERGENCY MEDICINE

## 2021-02-24 PROCEDURE — 250N000013 HC RX MED GY IP 250 OP 250 PS 637: Performed by: STUDENT IN AN ORGANIZED HEALTH CARE EDUCATION/TRAINING PROGRAM

## 2021-02-24 PROCEDURE — 99207 PR CDG-CODE CATEGORY CHANGED: CPT | Performed by: PHYSICIAN ASSISTANT

## 2021-02-24 PROCEDURE — 99232 SBSQ HOSP IP/OBS MODERATE 35: CPT | Performed by: NURSE PRACTITIONER

## 2021-02-24 PROCEDURE — 93010 ELECTROCARDIOGRAM REPORT: CPT | Performed by: INTERNAL MEDICINE

## 2021-02-24 PROCEDURE — 99222 1ST HOSP IP/OBS MODERATE 55: CPT | Mod: AI | Performed by: FAMILY MEDICINE

## 2021-02-24 PROCEDURE — 81001 URINALYSIS AUTO W/SCOPE: CPT | Performed by: NURSE PRACTITIONER

## 2021-02-24 PROCEDURE — 258N000003 HC RX IP 258 OP 636: Performed by: STUDENT IN AN ORGANIZED HEALTH CARE EDUCATION/TRAINING PROGRAM

## 2021-02-24 PROCEDURE — 99407 BEHAV CHNG SMOKING > 10 MIN: CPT

## 2021-02-24 PROCEDURE — 999N001017 HC STATISTIC GLUCOSE BY METER IP

## 2021-02-24 PROCEDURE — 85027 COMPLETE CBC AUTOMATED: CPT | Performed by: NURSE PRACTITIONER

## 2021-02-24 PROCEDURE — 82565 ASSAY OF CREATININE: CPT | Performed by: NURSE PRACTITIONER

## 2021-02-24 PROCEDURE — 36415 COLL VENOUS BLD VENIPUNCTURE: CPT | Performed by: STUDENT IN AN ORGANIZED HEALTH CARE EDUCATION/TRAINING PROGRAM

## 2021-02-24 PROCEDURE — 84100 ASSAY OF PHOSPHORUS: CPT | Performed by: STUDENT IN AN ORGANIZED HEALTH CARE EDUCATION/TRAINING PROGRAM

## 2021-02-24 PROCEDURE — 120N000002 HC R&B MED SURG/OB UMMC

## 2021-02-24 PROCEDURE — 87086 URINE CULTURE/COLONY COUNT: CPT | Performed by: NURSE PRACTITIONER

## 2021-02-24 PROCEDURE — 83735 ASSAY OF MAGNESIUM: CPT | Performed by: STUDENT IN AN ORGANIZED HEALTH CARE EDUCATION/TRAINING PROGRAM

## 2021-02-24 PROCEDURE — 84484 ASSAY OF TROPONIN QUANT: CPT | Performed by: NURSE PRACTITIONER

## 2021-02-24 PROCEDURE — 99226 PR SUBSEQUENT OBSERVATION CARE,LEVEL III: CPT | Performed by: EMERGENCY MEDICINE

## 2021-02-24 PROCEDURE — 84145 PROCALCITONIN (PCT): CPT | Performed by: STUDENT IN AN ORGANIZED HEALTH CARE EDUCATION/TRAINING PROGRAM

## 2021-02-24 PROCEDURE — 85379 FIBRIN DEGRADATION QUANT: CPT | Performed by: FAMILY MEDICINE

## 2021-02-24 PROCEDURE — 83036 HEMOGLOBIN GLYCOSYLATED A1C: CPT | Performed by: NURSE PRACTITIONER

## 2021-02-24 RX ORDER — ONDANSETRON 4 MG/1
4 TABLET, ORALLY DISINTEGRATING ORAL EVERY 6 HOURS PRN
Status: DISCONTINUED | OUTPATIENT
Start: 2021-02-24 | End: 2021-02-26 | Stop reason: HOSPADM

## 2021-02-24 RX ORDER — ONDANSETRON 2 MG/ML
4 INJECTION INTRAMUSCULAR; INTRAVENOUS EVERY 6 HOURS PRN
Status: DISCONTINUED | OUTPATIENT
Start: 2021-02-24 | End: 2021-02-26 | Stop reason: HOSPADM

## 2021-02-24 RX ORDER — NICOTINE 21 MG/24HR
1 PATCH, TRANSDERMAL 24 HOURS TRANSDERMAL EVERY 24 HOURS
Status: DISCONTINUED | OUTPATIENT
Start: 2021-02-24 | End: 2021-02-24

## 2021-02-24 RX ORDER — PREDNISONE 20 MG/1
40 TABLET ORAL DAILY
Status: DISCONTINUED | OUTPATIENT
Start: 2021-02-24 | End: 2021-02-26 | Stop reason: HOSPADM

## 2021-02-24 RX ORDER — LIDOCAINE 40 MG/G
CREAM TOPICAL
Status: DISCONTINUED | OUTPATIENT
Start: 2021-02-24 | End: 2021-02-26 | Stop reason: HOSPADM

## 2021-02-24 RX ORDER — MAGNESIUM HYDROXIDE/ALUMINUM HYDROXICE/SIMETHICONE 120; 1200; 1200 MG/30ML; MG/30ML; MG/30ML
30 SUSPENSION ORAL EVERY 4 HOURS PRN
Status: DISCONTINUED | OUTPATIENT
Start: 2021-02-24 | End: 2021-02-24

## 2021-02-24 RX ORDER — POLYETHYLENE GLYCOL 3350 17 G/17G
17 POWDER, FOR SOLUTION ORAL DAILY
Status: DISCONTINUED | OUTPATIENT
Start: 2021-02-25 | End: 2021-02-26 | Stop reason: HOSPADM

## 2021-02-24 RX ORDER — GLIPIZIDE 5 MG/1
5 TABLET, FILM COATED, EXTENDED RELEASE ORAL DAILY
Status: DISCONTINUED | OUTPATIENT
Start: 2021-02-24 | End: 2021-02-26 | Stop reason: HOSPADM

## 2021-02-24 RX ORDER — IPRATROPIUM BROMIDE AND ALBUTEROL SULFATE 2.5; .5 MG/3ML; MG/3ML
3 SOLUTION RESPIRATORY (INHALATION)
Status: DISCONTINUED | OUTPATIENT
Start: 2021-02-24 | End: 2021-02-24

## 2021-02-24 RX ORDER — KETOROLAC TROMETHAMINE 15 MG/ML
15 INJECTION, SOLUTION INTRAMUSCULAR; INTRAVENOUS EVERY 6 HOURS PRN
Status: DISCONTINUED | OUTPATIENT
Start: 2021-02-24 | End: 2021-02-26 | Stop reason: HOSPADM

## 2021-02-24 RX ORDER — ALBUTEROL SULFATE 0.83 MG/ML
3 SOLUTION RESPIRATORY (INHALATION)
Status: DISCONTINUED | OUTPATIENT
Start: 2021-02-24 | End: 2021-02-24

## 2021-02-24 RX ORDER — GLIPIZIDE 2.5 MG/1
2.5 TABLET, EXTENDED RELEASE ORAL DAILY
Status: DISCONTINUED | OUTPATIENT
Start: 2021-02-24 | End: 2021-02-24

## 2021-02-24 RX ORDER — IPRATROPIUM BROMIDE AND ALBUTEROL SULFATE 2.5; .5 MG/3ML; MG/3ML
3 SOLUTION RESPIRATORY (INHALATION) EVERY 4 HOURS
Status: DISCONTINUED | OUTPATIENT
Start: 2021-02-25 | End: 2021-02-26

## 2021-02-24 RX ORDER — LEVALBUTEROL INHALATION SOLUTION 1.25 MG/3ML
1.25 SOLUTION RESPIRATORY (INHALATION)
Status: DISCONTINUED | OUTPATIENT
Start: 2021-02-24 | End: 2021-02-26 | Stop reason: HOSPADM

## 2021-02-24 RX ORDER — LEVOFLOXACIN 500 MG/1
500 TABLET, FILM COATED ORAL DAILY
Status: DISCONTINUED | OUTPATIENT
Start: 2021-02-24 | End: 2021-02-24

## 2021-02-24 RX ORDER — NICOTINE POLACRILEX 4 MG
15-30 LOZENGE BUCCAL
Status: DISCONTINUED | OUTPATIENT
Start: 2021-02-24 | End: 2021-02-26 | Stop reason: HOSPADM

## 2021-02-24 RX ORDER — SODIUM CHLORIDE 9 MG/ML
INJECTION, SOLUTION INTRAVENOUS CONTINUOUS
Status: DISCONTINUED | OUTPATIENT
Start: 2021-02-24 | End: 2021-02-24

## 2021-02-24 RX ORDER — IPRATROPIUM BROMIDE AND ALBUTEROL SULFATE 2.5; .5 MG/3ML; MG/3ML
3 SOLUTION RESPIRATORY (INHALATION) EVERY 4 HOURS
Status: DISCONTINUED | OUTPATIENT
Start: 2021-02-24 | End: 2021-02-24

## 2021-02-24 RX ORDER — AMLODIPINE BESYLATE 10 MG/1
10 TABLET ORAL DAILY
Status: DISCONTINUED | OUTPATIENT
Start: 2021-02-24 | End: 2021-02-26 | Stop reason: HOSPADM

## 2021-02-24 RX ORDER — ACETAMINOPHEN 325 MG/1
650 TABLET ORAL EVERY 4 HOURS PRN
Status: DISCONTINUED | OUTPATIENT
Start: 2021-02-24 | End: 2021-02-26 | Stop reason: HOSPADM

## 2021-02-24 RX ORDER — ALBUTEROL SULFATE 90 UG/1
1-2 AEROSOL, METERED RESPIRATORY (INHALATION) EVERY 6 HOURS PRN
Status: DISCONTINUED | OUTPATIENT
Start: 2021-02-24 | End: 2021-02-24

## 2021-02-24 RX ORDER — DEXTROSE MONOHYDRATE 25 G/50ML
25-50 INJECTION, SOLUTION INTRAVENOUS
Status: DISCONTINUED | OUTPATIENT
Start: 2021-02-24 | End: 2021-02-26 | Stop reason: HOSPADM

## 2021-02-24 RX ORDER — ACETAMINOPHEN 650 MG/1
650 SUPPOSITORY RECTAL EVERY 4 HOURS PRN
Status: DISCONTINUED | OUTPATIENT
Start: 2021-02-24 | End: 2021-02-24

## 2021-02-24 RX ORDER — NICOTINE 21 MG/24HR
1 PATCH, TRANSDERMAL 24 HOURS TRANSDERMAL EVERY 24 HOURS
Status: DISCONTINUED | OUTPATIENT
Start: 2021-02-24 | End: 2021-02-26 | Stop reason: HOSPADM

## 2021-02-24 RX ADMIN — SODIUM CHLORIDE: 9 INJECTION, SOLUTION INTRAVENOUS at 19:57

## 2021-02-24 RX ADMIN — ACETAMINOPHEN 650 MG: 325 TABLET, FILM COATED ORAL at 03:26

## 2021-02-24 RX ADMIN — IPRATROPIUM BROMIDE AND ALBUTEROL SULFATE 3 ML: .5; 3 SOLUTION RESPIRATORY (INHALATION) at 16:36

## 2021-02-24 RX ADMIN — GLIPIZIDE 5 MG: 5 TABLET, FILM COATED, EXTENDED RELEASE ORAL at 08:56

## 2021-02-24 RX ADMIN — IPRATROPIUM BROMIDE AND ALBUTEROL SULFATE 3 ML: .5; 3 SOLUTION RESPIRATORY (INHALATION) at 20:59

## 2021-02-24 RX ADMIN — IPRATROPIUM BROMIDE AND ALBUTEROL SULFATE 3 ML: .5; 3 SOLUTION RESPIRATORY (INHALATION) at 02:57

## 2021-02-24 RX ADMIN — CEFEPIME HYDROCHLORIDE 2 G: 2 INJECTION, POWDER, FOR SOLUTION INTRAVENOUS at 22:31

## 2021-02-24 RX ADMIN — SODIUM CHLORIDE 1000 ML: 9 INJECTION, SOLUTION INTRAVENOUS at 11:56

## 2021-02-24 RX ADMIN — PREDNISONE 40 MG: 20 TABLET ORAL at 10:00

## 2021-02-24 RX ADMIN — SODIUM CHLORIDE 1000 ML: 9 INJECTION, SOLUTION INTRAVENOUS at 14:44

## 2021-02-24 RX ADMIN — IPRATROPIUM BROMIDE AND ALBUTEROL SULFATE 3 ML: .5; 3 SOLUTION RESPIRATORY (INHALATION) at 10:21

## 2021-02-24 RX ADMIN — AMLODIPINE BESYLATE 10 MG: 10 TABLET ORAL at 07:43

## 2021-02-24 RX ADMIN — LIDOCAINE HYDROCHLORIDE 15 ML: 20 SOLUTION ORAL; TOPICAL at 19:51

## 2021-02-24 RX ADMIN — SODIUM CHLORIDE: 9 INJECTION, SOLUTION INTRAVENOUS at 14:19

## 2021-02-24 RX ADMIN — ALUMINUM HYDROXIDE, MAGNESIUM HYDROXIDE, AND SIMETHICONE 30 ML: 200; 200; 20 SUSPENSION ORAL at 16:16

## 2021-02-24 RX ADMIN — LEVOFLOXACIN 500 MG: 500 TABLET, FILM COATED ORAL at 07:44

## 2021-02-24 RX ADMIN — SODIUM CHLORIDE 1000 ML: 9 INJECTION, SOLUTION INTRAVENOUS at 18:51

## 2021-02-24 RX ADMIN — ENOXAPARIN SODIUM 40 MG: 40 INJECTION SUBCUTANEOUS at 20:41

## 2021-02-24 RX ADMIN — ACETAMINOPHEN 650 MG: 325 TABLET, FILM COATED ORAL at 16:15

## 2021-02-24 RX ADMIN — KETOROLAC TROMETHAMINE 15 MG: 15 INJECTION, SOLUTION INTRAMUSCULAR; INTRAVENOUS at 20:41

## 2021-02-24 ASSESSMENT — ACTIVITIES OF DAILY LIVING (ADL): ADLS_ACUITY_SCORE: 16

## 2021-02-24 ASSESSMENT — MIFFLIN-ST. JEOR: SCORE: 1408.25

## 2021-02-24 NOTE — CODE/RAPID RESPONSE
Rapid Response Team Note    Assessment   In assessment a rapid response was called on Jenn Aparicio due to lactic acidosis. This presentation is likely due to hypovolemia recent nebs for COPD exacerbation and worsened by known recurrence of malignancy.     Plan   -  Give another 1L IVFs with repeat LA tomorrow am. Follow up results of BCs NGTD.  -  The Emergency Room primary team was able to be reached and they are in agreement with the above plan.  -  Disposition: The patient will remain on the current unit. We will continue to monitor this patient closely.  -  Reassessment and plan follow-up will be performed by the primary team    Isaias Tiwari PA-C  [facility] RRT AMCOM Job Code Contact #[phone]    Hospital Course   Brief Summary of events leading to rapid response:   Pt had RRT several hrs ago and repeat LA still >4 so RRT called again.    Admission Diagnosis:   Chronic obstructive pulmonary disease, unspecified COPD type (H) [J44.9]     Physical Exam   Temp: 98.7  F (37.1  C) Temp  Min: 97.6  F (36.4  C)  Max: 98.7  F (37.1  C)  Resp: 20 Resp  Min: 16  Max: 22  SpO2: 98 % SpO2  Min: 91 %  Max: 98 %  Pulse: 102 Pulse  Min: 87  Max: 112    No data recorded  BP: 135/63 Systolic (24hrs), Av , Min:110 , Max:155   Diastolic (24hrs), Av, Min:47, Max:97     I/Os: No intake/output data recorded.     Exam:   General: in no acute distress  Mental Status: baseline mental status.      Significant Results and Procedures   Lactic Acid:   Recent Labs   Lab Test 21  1349 21  1110   LACT 4.4*  --    LACTS  --  5.3*     CBC:   Recent Labs   Lab Test 21  0631 21  2021 12/15/20  0759   WBC 7.8 6.5 6.3   HGB 13.7 13.5 14.7   HCT 44.5 43.3 47.2*    211 202        Sepsis Evaluation   The patient is not known to have an infection.  NO EVIDENCE OF SEPSIS at this time.  Vital sign, physical exam, and lab findings are likely due to hypovolemia, recent nebs, and known recurrence of lung  cancer.

## 2021-02-24 NOTE — UTILIZATION REVIEW
Admission Status; Secondary Review Determination    Under the authority of the Utilization Management Committee, the utilization review process indicated a secondary review on the above patient. The review outcome is based on review of the medical records, discussions with staff, and applying clinical experience noted on the date of the review.    (x) Inpatient Status Appropriate - This patient's medical care is consistent with medical management for inpatient care and reasonable inpatient medical practice.    RATIONALE FOR DETERMINATION: 65-year-old female with complex pulmonary history including COPD on home oxygen, lung cancer status post right lower lobe partial lobectomy, suspected right upper lobe cancer status post radiation 1 year ago, tobacco usage and comorbidities of type 2 diabetes and hypertension now presents with significant shortness of breath.  Patient clinically has severe COPD with COPD exacerbation which will require multiple nights in the hospital with active management complicated by persistent lactic acidosis and tachycardia.    At the time of admission with the information available to the attending physician more than 2 nights Hospital complex care was anticipated, based on patient risk of adverse outcome if treated as outpatient and complex care required. Inpatient admission is appropriate based on the Medicare guidelines.    This document was produced using voice recognition software    The information on this document is developed by the utilization review team in order for the business office to ensure compliance. This only denotes the appropriateness of proper admission status and does not reflect the quality of care rendered.    The definitions of Inpatient Status and Observation Status used in making the determination above are those provided in the CMS Coverage Manual, Chapter 1 and Chapter 6, section 70.4.    Sincerely,    Anuj Dietrich MD  Utilization Review  Physician  Advisor  [facility].

## 2021-02-24 NOTE — PROGRESS NOTES
"/64   Pulse 107   Temp 98.1  F (36.7  C) (Oral)   Resp 22   Ht 1.575 m (5' 2\")   Wt 91 kg (200 lb 9.9 oz)   SpO2 98%   BMI 36.69 kg/m      - Improvement of Peak Flow to greater than 70% sustained off nebulizer for 4 hours. - not met   - Dyspnea improved and oxygen saturations greater than 91% on room air or prior home oxygen levels - not met   - Vitals signs normal or at patient baseline - not met   - Safe disposition plan has been identified - not met   - Nurse to notify provider when observation goals have been met and patient is ready for discharge.    Pt became tachycardic, 's. Lactic triggered for 5.3. IVF bolus given over 2 hours. Bl clx, US/UC sent. Eating and drinking well. RUSSELL. Voided once at beside.            "

## 2021-02-24 NOTE — DISCHARGE SUMMARY
Patient ID:  Jenn Aparicio  MRN: [id number]  65 year old  YOB: 1955    Observation Admit Date: 2/23/2021    ED Admitting Attending: Luis M Gustafson MD    Transfer Date and Time: February 24, 2021 at 4:08 PM     Transferring Observation Provider: Deborah Bosch NP, APRN CNP    Admission Diagnoses:     1. Chronic obstructive pulmonary disease, unspecified COPD type (H)        Transfer Diagnoses:    ##COPD Exacerbation  ##Very Severe COPD  ##Lactic Acidosis  ##Lung adenocarcinoma  ##DMII  ##Hyperglycemia    Emergency Department and Observation Course:  Jenn Aparicio is a 65 year old female with a history of very severe COPD (FEV1 24%) on 2L O2 (states she only uses at night), RLL IA2 [id number] adenocarcinoma s/p lobectomy 2/2016 and suspected RUL [id number] IA2 NSCLC s/p SBRT 1/2020, Tobacco use, DMII, and HTN who presented via EMS with shortness of breath.     ##COPD Exacerbation:  ##Very Severe COPD:  Primary provider e-prescribed azithromycin and prednisone for patient on 2/22/21. She was unable to start these as they went to her mail order pharmacy. She is on 2-2.5 liters of oxygen at home at baseline. She states she usually only uses this at night but per notes prescribed continuously PFTs done in December reveal very severe airflow obstruction with significant bronchodilator response. Severe diffusion defect. In the ED, /85, HR 98, temp 97.6, RR 16, SPO2 93% on 2 liters oxygen per nasal cannula. EKG shows NSR. Chest xray shows Bibasilar streaky opacities, likely atelectasis, with small right pleural effusion. Chronic emphysematous changes. Labs show Na 141, K+ 3.5, Cr 0.66, BUN 7. Anion gap 2. Carbon dioxide 34. BNP 39. Troponin negative. CBC is unremarkable. Covid/RSV/Influenza panel is negative. She was given DuoNeb x2 in the ED and Solumedrol 81.25mg IV x1. She was admitted to observation for further management of COPD exacerbation. This morning she notes ongoing dyspnea, increased from  "baseline. VSS on home 2 L NC.   - VS Q4hrs  - Continuous pulse oximetry  - Telemetry  - Oxygen at 2liters/min, pts baseline  - Prednisone daily  - Levaquin daily   - IVF  - DuoNeb Q4hrs  - Hold home inhaler while in observation  - COPD team consult   - UR reviewed her case and recommended transition to in-patient status     ##Lactic Acidosis: Lactic acid drawn due to tachycardia and increased RR. Resulted at 5.3. Rapid response was called. Likely 2/2 underlying malignancy, nebs, and COPD exacerbation. Low suspicion for sepsis at this time but will monitor closely. Chest x-ray as above.  Blood Cultures done x 2. She was given  1 L NS bolus. Repeat lactic acid 4.4. Will give another 1 L NS bolus. UA unremarkable.   - Repeat lactic acid in the am   - Continue Levaquin     ##Chest Pain: Patient notes centralized chest pain this evening. EKG unchanged. Patient states pain has been going on for \"a while\". Could be 2/2 COPD exacerbation, GERD, versus cardiac cause. Troponin negative. PE is considered. However, symptoms could also be explained by COPD exacerbation. Discussed with triage, in-patient team to determine if further work-up needed.  -follow     ##Lung adenocarcinoma: IA2 [id number] poorly diff adeno RLL, then [id number] IA2 suspected NSCLC RUL, medically inoperable, Follows with Oncology. Next follow-up in April, per patient.  - Follow-up out-patient       ##DMII:  ##Hyperglycemia:   Last A1c: 6.8 on 9/8/2020. Patient now on steroids and starting fluoroquinolone antibiotic so glucose control may be less predictable.   -Resume glipizide but monitor while on fluoroquinolone for COPD exacerbation given increased risk of hypoglycemia.   -Glucose checks with meals and at bedtime  -Sliding scale insulin coverage ordered     ##CKD Stage 3: Creatinine stable, monitor      ##HTN:  Last echo 1/2019 shows EF of 60-65%. There is no left ventricular wall motion abnormality identified. Normal right ventricular size and systolic " "function. No significant valvular regurgitation.   -Continue PTA amlodipine      ##Tobacco Abuse: Nicotine patch       At this time the patient has failed observation management due to COPD Exacerbation and will be transferred to inpatient status.    Consults: RT    DATA:    Transfer Exam:    /63   Pulse 102   Temp 98.7  F (37.1  C) (Oral)   Resp 20   Ht 1.575 m (5' 2\")   Wt 91 kg (200 lb 9.9 oz)   SpO2 98%   BMI 36.69 kg/m    GENERAL: Alert and oriented x 3. NAD.   HEENT: Anicteric sclera. Mucous membranes moist.   CV: RRR. S1, S2. No murmurs appreciated.   RESPIRATORY: Effort normal at rest, labored with activity. Lungs diminished with expiratory wheezes. .  GI: Abdomen soft and non distended with normoactive bowel sounds present in all quadrants. No tenderness, rebound, guarding.   NEUROLOGICAL: No focal deficits. Moves all extremities.    EXTREMITIES: No peripheral edema. Intact bilateral pedal pulses.   SKIN: No jaundice. No rashes    Current Medications:    No current outpatient medications on file.       Medications Prior to Admission:    Medications Prior to Admission   Medication Sig Dispense Refill Last Dose     albuterol (PROAIR HFA/PROVENTIL HFA/VENTOLIN HFA) 108 (90 Base) MCG/ACT inhaler INHALE 1 OR 2 PUFFS BY MOUTH EVERY 4 HOURS AS NEEDED 1 Inhaler 10      albuterol (PROVENTIL) (2.5 MG/3ML) 0.083% neb solution Take 1 vial (2.5 mg) by nebulization every 6 hours as needed for shortness of breath / dyspnea or wheezing 90 mL 3      Alpha-Lipoic Acid 300 MG CAPS Take 300 mg by mouth daily        amLODIPine (NORVASC) 10 MG tablet Take 1 tablet (10 mg) by mouth daily 90 tablet 3      Aspirin (ASPIR-81 PO) Take  by mouth.        azithromycin (ZITHROMAX) 250 MG tablet Take 2 tablets (500 mg) by mouth daily for 1 day, THEN 1 tablet (250 mg) daily for 4 days. 6 tablet 0      diphenoxylate-atropine (LOMOTIL) 2.5-0.025 MG tablet Take 1 tablet by mouth 4 times daily as needed for diarrhea 30 tablet 1  "     fluticasone-vilanterol (BREO ELLIPTA) 200-25 MCG/INH inhaler Inhale 1 puff into the lungs daily 3 Inhaler 1      glipiZIDE (GLUCOTROL XL) 2.5 MG 24 hr tablet Take 1 tablet (2.5 mg) by mouth daily 60 tablet 3      ipratropium - albuterol 0.5 mg/2.5 mg/3 mL (DUONEB) 0.5-2.5 (3) MG/3ML neb solution Take 1 vial (3 mLs) by nebulization every 6 hours as needed for shortness of breath / dyspnea or wheezing 1080 mL 10      nicotine (NICODERM CQ) 14 MG/24HR 24 hr patch Place 1 patch onto the skin every 24 hours 84 patch 1      predniSONE (DELTASONE) 20 MG tablet Take 2 tablets (40 mg) by mouth daily for 5 days 10 tablet 0      predniSONE (DELTASONE) 20 MG tablet Take 1 tablet (20 mg) by mouth daily (Patient not taking: Reported on 2/12/2021) 3 tablet 0      sodium chloride (NEBUSAL) 3 % neb solution Take 3 mLs by nebulization daily Use 1-2 times daily in combination with Duoneb and Aerobika to help with mucus clearance 250 mL 3      umeclidinium (INCRUSE ELLIPTA) 62.5 MCG/INH inhaler Inhale 1 puff into the lungs daily 1 each 3        Significant Diagnostic Studies:     Results for orders placed or performed during the hospital encounter of 02/23/21   XR Chest Port 1 View     Status: None    Narrative    Chest radiograph 2/23/2021 8:36 PM    HISTORY: Shortness of breath    COMPARISON: CT chest 12/15/2020    FINDINGS:  Single AP view of the chest. Trachea is midline. Cardiac silhouette is  within normal limits. No pneumothorax. Small right pleural effusion.  No significant left pleural effusion. Bilateral upper lobe predominant  emphysematous changes. Streaky bibasilar opacities.      Impression    IMPRESSION:  1. Bibasilar streaky opacities, likely atelectasis, with small right  pleural effusion.  2. Chronic emphysematous changes.    I have personally reviewed the examination and initial interpretation  and I agree with the findings.    EVAN PANDA MD   CBC with platelets differential     Status: Abnormal   Result  Value Ref Range    WBC 6.5 4.0 - 11.0 10e9/L    RBC Count 4.85 3.8 - 5.2 10e12/L    Hemoglobin 13.5 11.7 - 15.7 g/dL    Hematocrit 43.3 35.0 - 47.0 %    MCV 89 78 - 100 fl    MCH 27.8 26.5 - 33.0 pg    MCHC 31.2 (L) 31.5 - 36.5 g/dL    RDW 13.1 10.0 - 15.0 %    Platelet Count 211 150 - 450 10e9/L    Diff Method Automated Method     % Neutrophils 49.3 %    % Lymphocytes 32.9 %    % Monocytes 11.3 %    % Eosinophils 5.8 %    % Basophils 0.5 %    % Immature Granulocytes 0.2 %    Nucleated RBCs 0 0 /100    Absolute Neutrophil 3.2 1.6 - 8.3 10e9/L    Absolute Lymphocytes 2.2 0.8 - 5.3 10e9/L    Absolute Monocytes 0.7 0.0 - 1.3 10e9/L    Absolute Eosinophils 0.4 0.0 - 0.7 10e9/L    Absolute Basophils 0.0 0.0 - 0.2 10e9/L    Abs Immature Granulocytes 0.0 0 - 0.4 10e9/L    Absolute Nucleated RBC 0.0    D dimer quantitative     Status: None   Result Value Ref Range    D Dimer <0.3 0.0 - 0.50 ug/ml FEU   INR     Status: None   Result Value Ref Range    INR 1.02 0.86 - 1.14   Comprehensive metabolic panel     Status: Abnormal   Result Value Ref Range    Sodium 141 133 - 144 mmol/L    Potassium 3.5 3.4 - 5.3 mmol/L    Chloride 105 94 - 109 mmol/L    Carbon Dioxide 34 (H) 20 - 32 mmol/L    Anion Gap 2 (L) 3 - 14 mmol/L    Glucose 110 (H) 70 - 99 mg/dL    Urea Nitrogen 7 7 - 30 mg/dL    Creatinine 0.66 0.52 - 1.04 mg/dL    GFR Estimate >90 >60 mL/min/[1.73_m2]    GFR Estimate If Black >90 >60 mL/min/[1.73_m2]    Calcium 8.7 8.5 - 10.1 mg/dL    Bilirubin Total 0.1 (L) 0.2 - 1.3 mg/dL    Albumin 3.3 (L) 3.4 - 5.0 g/dL    Protein Total 7.0 6.8 - 8.8 g/dL    Alkaline Phosphatase 114 40 - 150 U/L    ALT 14 0 - 50 U/L    AST 11 0 - 45 U/L   Troponin I     Status: None   Result Value Ref Range    Troponin I ES <0.015 0.000 - 0.045 ug/L   Nt probnp inpatient (BNP)     Status: None   Result Value Ref Range    N-Terminal Pro BNP Inpatient 39 0 - 900 pg/mL   Symptomatic Influenza A/B & SARS-CoV2 (COVID-19) Virus PCR Multiplex     Status:  None    Specimen: Nasopharyngeal   Result Value Ref Range    Flu A/B & SARS-COV-2 PCR Source Nasopharyngeal     SARS-CoV-2 PCR Result NEGATIVE     Influenza A PCR Negative NEG^Negative    Influenza B PCR Negative NEG^Negative    Respiratory Syncytial Virus PCR Negative NEG^Negative    Flu A/B & SARS-CoV-2 PCR Comment (Note)    Glucose by meter     Status: Abnormal   Result Value Ref Range    Glucose 276 (H) 70 - 99 mg/dL   CBC with platelets     Status: Abnormal   Result Value Ref Range    WBC 7.8 4.0 - 11.0 10e9/L    RBC Count 4.88 3.8 - 5.2 10e12/L    Hemoglobin 13.7 11.7 - 15.7 g/dL    Hematocrit 44.5 35.0 - 47.0 %    MCV 91 78 - 100 fl    MCH 28.1 26.5 - 33.0 pg    MCHC 30.8 (L) 31.5 - 36.5 g/dL    RDW 13.1 10.0 - 15.0 %    Platelet Count 196 150 - 450 10e9/L   Basic metabolic panel     Status: Abnormal   Result Value Ref Range    Sodium 138 133 - 144 mmol/L    Potassium 4.1 3.4 - 5.3 mmol/L    Chloride 102 94 - 109 mmol/L    Carbon Dioxide 28 20 - 32 mmol/L    Anion Gap 7 3 - 14 mmol/L    Glucose 290 (H) 70 - 99 mg/dL    Urea Nitrogen 12 7 - 30 mg/dL    Creatinine 0.55 0.52 - 1.04 mg/dL    GFR Estimate >90 >60 mL/min/[1.73_m2]    GFR Estimate If Black >90 >60 mL/min/[1.73_m2]    Calcium 9.4 8.5 - 10.1 mg/dL   Hemoglobin A1c     Status: Abnormal   Result Value Ref Range    Hemoglobin A1C 7.0 (H) 0 - 5.6 %   Glucose by meter     Status: Abnormal   Result Value Ref Range    Glucose 259 (H) 70 - 99 mg/dL   Glucose by meter     Status: Abnormal   Result Value Ref Range    Glucose 228 (H) 70 - 99 mg/dL   Lactic acid level STAT     Status: Abnormal   Result Value Ref Range    Lactate for Sepsis Protocol 5.3 (HH) 0.7 - 2.0 mmol/L   Lactic acid whole blood     Status: Abnormal   Result Value Ref Range    Lactic Acid 4.4 (HH) 0.7 - 2.0 mmol/L   UA with Microscopic reflex to Culture     Status: Abnormal    Specimen: Urine Midstream; Midstream Urine   Result Value Ref Range    Color Urine Yellow     Appearance Urine Clear      Glucose Urine >1000 (A) NEG^Negative mg/dL    Bilirubin Urine Negative NEG^Negative    Ketones Urine 10 (A) NEG^Negative mg/dL    Specific Gravity Urine 1.032 1.003 - 1.035    Blood Urine Trace (A) NEG^Negative    pH Urine 5.5 5.0 - 7.0 pH    Protein Albumin Urine 30 (A) NEG^Negative mg/dL    Urobilinogen mg/dL Normal 0.0 - 2.0 mg/dL    Nitrite Urine Negative NEG^Negative    Leukocyte Esterase Urine Negative NEG^Negative    Source Midstream Urine     WBC Urine 0 0 - 5 /HPF    RBC Urine 1 0 - 2 /HPF    Squamous Epithelial /HPF Urine 3 (H) 0 - 1 /HPF    Mucous Urine Present (A) NEG^Negative /LPF   Glucose by meter     Status: Abnormal   Result Value Ref Range    Glucose 227 (H) 70 - 99 mg/dL   EKG 12-lead, tracing only     Status: None   Result Value Ref Range    Interpretation ECG Click View Image link to view waveform and result    EKG 12-lead, complete     Status: None (Preliminary result)   Result Value Ref Range    Interpretation ECG Click View Image link to view waveform and result    Care Management / Social Work IP Consult     Status: None ()    Stacie La, RN     2/24/2021  2:43 PM  Care Management Initial Consult    General Information  Assessment completed with: Patient  Type of CM/SW Visit: Initial Assessment  Primary Care Provider verified and updated as needed: Yes   Readmission within the last 30 days: no previous admission in   last 30 days   Reason for Consult: discharge planning  Advance Care Planning: No ACP documents on file, declines at this   time.      Communication Assessment  Patient's communication style: spoken language (English or   Bilingual)    Hearing Difficulty or Deaf: no   Wear Glasses or Blind: no    Cognitive  Cognitive/Neuro/Behavioral: WDL                      Living Environment:   People in home: alone     Current living Arrangements: apartment      Able to return to prior arrangements: yes    Family/Social Support:  Care provided by: self, other (see  comments)(PCA)  Provides care for: no one  Marital Status: Single  Support System: Children          Description of Support System: Supportive, Involved      Current Resources:   Patient receiving home care services: No  Community Resources: County Worker, PCA  Equipment currently used at home: walker, rolling  Supplies currently used at home: Oxygen Tubing/Supplies,   nebulizer    Employment/Financial:  Employment Status: disabled     Financial Concerns: No concerns identified     Lifestyle & Psychosocial Needs:    Socioeconomic History     Marital status: Single     Spouse name: Not on file     Number of children: Not on file     Years of education: Not on file     Highest education level: Not on file     Tobacco Use     Smoking status: Current Every Day Smoker     Packs/day: 0.25     Types: Cigarettes     Smokeless tobacco: Former User     Tobacco comment: 2/24/2021 Patient has quit smoking as of   2/12/2012. Will not be considered as fully quit until smoke-free   for 6 months   Substance and Sexual Activity     Alcohol use: Yes     Comment: sometime     Drug use: No     Sexual activity: Not Currently     Partners: Male       Functional Status:  Prior to admission patient needed assistance: Independent, needs   assistance w/some ADLS when not feeling well.      Mental Health Status: Not discussed at this time.     Chemical Dependency Status: Not discussed at this time.        Values/Beliefs:  Spiritual, Cultural Beliefs, Mormonism Practices, Values that   affect care: no         Additional Information:  Patient lives locally, alone and mostly independent. Patient   states that she has been trying really hard to not come to the   hospital and has found herself quite short of breath and needing   more help than usual from her adult children. Patient notes that   she has PCA services, unclear how many hours she has or if these   hours are fully staffed. Patient is on home oxygen through   [facility],  states that she is unhappy with her   portability (too difficult to fill tanks, has requested a   concentrator, has not received and has not been please w/[facility]). Patient notes that she has a nebulizer that is   quite old and seems to be over heating, provider placed order for   new nebulizer, will contact [facility] to arrange at   discharge. Patient notes that she will likely need to take a   medical cab at discharge and will need portable oxygen, as she   came into the hospital via EMS.    DME:   [facility] (new nebulizer)  Phone: [phone]    [facility] (PTA Home O2)  Phone: [phone]  Fax: [phone]    Discharge transportation:  [employer] Ride: [phone]  or  [facility] w/c ride w/portable O2: [phone]    BENJAMIN completed w/patient. RNCC will continue to follow for   discharge planning.     1402 Addendum:   [facility] on call line contacted at this time for set   up of new nebulizer.     1440 Addendum:  [facility] has received order for new nebulizer, RNCC will need to call   on day of discharge for delivery.     Stacie Bermeo, RNCC, BSN    [facility]  [address] [place] [id number]    [email or web address]    Office: [phone] Pager: [phone]  To contact the weekend RNCC, page [phone].          RT Chronic Pulmonary Disease Specialist Consult     Status: None ()    Nayana Garza, RT     2021 11:47 AM  Chronic Pulmonary Disease Specialist Consult   COPD Initial Interview    2021    Patient: Jenn Aparicio      :  1955                      MRN:[id number]      Reason for Consult:  Patient with severe COPD being followed by   COPD Readmission Reduction Program    History of Present Illness: Jenn Aparicio is a 65 year old female   admitted on 2021. She has a history of COPD on home O2 2   liter/minute, lung cancer status post  right lower lobe partial   lobectomy, suspected RUL [id number] IA2 NSCLC s/p SBRT 1/2020,   Tobacco use, DMII, and HTN who presents via EMS with shortness of   breath.    Most recent hospitalization and reason:         4/28/2020  ED visit for median nerve dysfunction    10/18/2019  Hospital admission   Acute respiratory failure with   hypoxemia    Smoking Hx:  .25 PPD - .50 PPD  Jenn has quit smoking as of   02/12/2021. She is using a 14 mg nicotine patch. She declines gum   and lozenges.      Diagnosis of COPD:    Severe obstruction with air trapping and   hyperinflation     Pulmonologist/Last office visit   2/12/2021 Virtual visit with   Dr. Lynda Spann at [facility].    Most recent  PFT/interpretation on:   12/15/2020                   Pre Bronchodilator   Post Bronchodilator  FVC           1.32 L   44%    FEV1           0.57 L   24%    FEV1/FVC   (Ratio)                      44%                                 DLCO                   38%    Interpretation       The FVC, FEV1 and FEV1/FVC ratio are reduced.  The inspiratory   flow rates are reduced.  The TLC, RV, FRC and RV/TLC ratio are   all increased indicating overinflation and air trapping.    Following administration of bronchodilators, there is a   significant response.  The diffusing capacity is reduced.   IMPRESSION:   Very Severe Airflow Obstruction with a significant bronchodilator   response.     Severe diffusion defect.    Relevant Sleep Studies:  Has never had sleep study and does   acknowledge sleep difficulty and waking gasping for air.    Home Oxygen Use:   1.5 L - 2.5 L Continuous    States that she   has attempted to get portable oxygen devices that are easier to   use than her tanks. She is unable to change the regulator due to   hand weakness.    Most recent Chest X-ray:  2/23/2021    Trachea is midline.   Cardiac silhouette is  within normal limits. No pneumothorax. Small right pleural   effusion. No significant left  pleural effusion. Bilateral upper   lobe predominant emphysematous changes. Streaky bibasilar   opacities.    Most recent CT:  12/15/2020     Postsurgical changes of right   lower lobectomy. There are some subpleural postradiation changes   posterior lateral right upper lobe with underlying nodularity   measuring 7 x 6 mm, previously 7 x 6 mm. Small calcified   granuloma in the medial right upper lobe on image 97. No new or   enlarging solid pulmonary nodules.     Central airways are clear. No pneumothorax. No pleural effusion.   No  new focal consolidation. There are moderate apical predominant  centrilobular emphysematous changes.       Patient up-to-date on Annual Influenza/ Pneumococcal Vaccination:    She has had her Influenza vaccine this year and is up to date   with Pneumococcal vaccine.     Pulmonary Rehab History:   Not attended     Home respiratory medications include:      Albuterol MDI   2 puffs Q 4 prn  Albuterol nebulizer Q 6 prn  Breo Ellipta   1 puff Daily  Incruse Ellipta 1 puff Daily  Duonebs   Q 6 prn  Sodium chloride 3% neb           Assessment:    Jenn was found reclining in bed with audible   wheezing and visible shortness of breath. She was on 2 L NC with   oxygen saturation 98%.        Action:         Evaluated patients inspiratory flow using In-Check device:     --Low resistance setting: Patient able to [id number] LPM, which   adequate for her rescue Albuterol MDI.  Albuterol inhalers   require 20-30 LPM for optimal drug disposition.      --Medium resistance setting: Patient able to generate 35 LPM,   which is sufficient inspiratory flow for her current Breo and   Incruse DPI/MDI.        -Evaluated patients coordination and technique with inhaler:   Patient demonstrated good technique with all of her inhalers.   Educated patient on proper inspiratory flows needed for all of   her inhalers. Provided and instructed patient on proper use of   Aerochamber spacer with inhaler; reiterated that  it should always   be used with her rescue inhaler.       -Patient able to generate a pressure of 10-15 cm H2O on Aerobika   OPEP device for 2-3 seconds generating good strong   productive/non-productive cough. The goal for each breath, for a   total of 3 sets of 10 breaths, is to exhale at a of pressure   10-20 for 3-4 seconds without fatigue. Educated patient to   perform 2 to 3 'marcelino coughs'  to clear airway after each set.   Shared that device can be used with or without nebs. Shared that   consistent use of this device will help open smaller airways,   improve mucus clearance, decrease cough frequency, and improve   exercise tolerance.     Recommendations:  -Continue with current inpatient respiratory medication schedule.       -Will need follow up appointment with Pulmonologist. Saw Dr. Spann at the [facility] on 2/12/2021. She   is to return in 3 months. I let her know that when she returns   home she should call clinic right away to schedule this visit as   it may take awhile to get an appointment.     -Use Aerobika Oscillating PEP Device for 3 sets of 10 breaths two   times daily as directed above    -14 mg Nicotine Patch to help reduce symptoms of withdrawal and   cravings   -Smoking Cessation Counseling and Relapse Prevention Consult   (provided during this visit)  -2mg/4mg Nicotine Lozenges/Gum for cravings and urges   Does not   like the taste and will not use    -Referral for outpatient pulmonary Rehab    -Referral for OP sleep consult to assess for sleep disordered   breathing, DAVID, nocturnal hypoxia, and hypoventilation    -Patient needs nebulizer compressor at discharge with   prescription for tubing, cups and mask. Per insurance   requirements, Physician documentation in F2F notes needs to   include dx diagnosis and need for nebulizer and medication   frequency.    -Home Oxygen Assessment Testing 24-48 hours prior to discharge to   determine O2 needs at rest and with  "exertion/activity. If the   patient requires oxygen at rest, the walk test must be performed   using the new baseline O2 to determine if patient needs more   oxygen with activity.   -In the event patient qualifies for oxygen, at rest or with   activity, please use the following verbiage in oxygen order:   \"Please provide portable oxygen concentrator AND please test for   oxygen conserving device using ____LPM to maintain sats between   ____)    -At discharge continue with patient's home regimen of respiratory   medications.  Jenn did not receive her Duoneb solution and her Breo Ellipta   after her 2/12/2021 pulmonary visit as the prescription was sent   to a home order company in [place] that is not her preferred   pharmacy. Please send home with a new prescription for both of   these.      Will continue to follow and support patient as needed. Will   follow up with phone call 48 hours after discharge.     I spent 60 minutes with the patient.    SILVESTRE Mistry, RRT, CTTS  Chronic Pulmonary Disease Specialist  Office: [phone]   Pager: [phone]              Care Management / Social Work IP Consult     Status: None ()    Stacie La, JOHANN     2/24/2021  2:43 PM  Care Management Initial Consult    General Information  Assessment completed with: Patient  Type of CM/SW Visit: Initial Assessment  Primary Care Provider verified and updated as needed: Yes   Readmission within the last 30 days: no previous admission in   last 30 days   Reason for Consult: discharge planning  Advance Care Planning: No ACP documents on file, declines at this   time.      Communication Assessment  Patient's communication style: spoken language (English or   Bilingual)    Hearing Difficulty or Deaf: no   Wear Glasses or Blind: no    Cognitive  Cognitive/Neuro/Behavioral: WDL                      Living Environment:   People in home: alone     Current living Arrangements: apartment      Able to return to prior " arrangements: yes    Family/Social Support:  Care provided by: self, other (see comments)(PCA)  Provides care for: no one  Marital Status: Single  Support System: Children          Description of Support System: Supportive, Involved      Current Resources:   Patient receiving home care services: No  Community Resources: County Worker, PCA  Equipment currently used at home: walker, rolling  Supplies currently used at home: Oxygen Tubing/Supplies,   nebulizer    Employment/Financial:  Employment Status: disabled     Financial Concerns: No concerns identified     Lifestyle & Psychosocial Needs:    Socioeconomic History     Marital status: Single     Spouse name: Not on file     Number of children: Not on file     Years of education: Not on file     Highest education level: Not on file     Tobacco Use     Smoking status: Current Every Day Smoker     Packs/day: 0.25     Types: Cigarettes     Smokeless tobacco: Former User     Tobacco comment: 2/24/2021 Patient has quit smoking as of   2/12/2012. Will not be considered as fully quit until smoke-free   for 6 months   Substance and Sexual Activity     Alcohol use: Yes     Comment: sometime     Drug use: No     Sexual activity: Not Currently     Partners: Male       Functional Status:  Prior to admission patient needed assistance: Independent, needs   assistance w/some ADLS when not feeling well.      Mental Health Status: Not discussed at this time.     Chemical Dependency Status: Not discussed at this time.        Values/Beliefs:  Spiritual, Cultural Beliefs, Yarsanism Practices, Values that   affect care: no         Additional Information:  Patient lives locally, alone and mostly independent. Patient   states that she has been trying really hard to not come to the   hospital and has found herself quite short of breath and needing   more help than usual from her adult children. Patient notes that   she has PCA services, unclear how many hours she has or if these   hours  are fully staffed. Patient is on home oxygen through   [facility], states that she is unhappy with her   portability (too difficult to fill tanks, has requested a   concentrator, has not received and has not been please w/   Respiratory). Patient notes that she has a nebulizer that is   quite old and seems to be over heating, provider placed order for   new nebulizer, will contact [facility] to arrange at   discharge. Patient notes that she will likely need to take a   medical cab at discharge and will need portable oxygen, as she   came into the hospital via EMS.    DME:   [facility] (new nebulizer)  Phone: [phone]    [facility] (PTA Home O2)  Phone: [phone]  Fax: [phone]    Discharge transportation:  [employer]: [phone]  or  [employer] w/c ride w/portable O2: [phone]    BENJAMIN completed w/patient. RNCC will continue to follow for   discharge planning.     1405 Addendum:   [facility] on call line contacted at this time for set   up of new nebulizer.     1440 Addendum:  [facility] has received order for new nebulizer, RNCC will need to call   on day of discharge for delivery.     Stacie Bermeo, RNCC, BSN    [facility]  [address] [place] [id number]    [email or web address]    Office: [phone] Pager: [phone]  To contact the weekend RNCC, page [phone].          Smoking Cessation Program IP Consult     Status: None ()    Nayana Garza, RT     2021  4:04 PM  Smoking Cessation Consult   2021    Patient: Jenn Aparicio      :  1955                      MRN:[id number]      Chronic obstructive pulmonary disease, unspecified COPD type (H)   [J44.9] @HX    History of Present Illness: 65 year old female admitted on   2021. She has a history of COPD on home O2 2   liter/minute, lung cancer status post right lower  "lobe partial   lobectomy, suspected RUL [id number] IA2 NSCLC s/p SBRT 1/2020,   Tobacco use, DMII, and HTN who presents via EMS with shortness of   breath.    Reason for Consult: Patient identified as current everyday smoker   per patient chart.     Patient to sharing about her tobacco use and in learning about   more options and resources for quitting, staying quit, and in   developing a relapse prevention plan.     Symptoms of craving or withdrawal: Patient is currently not   experiencing symptoms of withdrawal such as sleeplessness,   headache, anxiety, irritability, and intense cravings to smoke.      Motivational Interviewing:     MI Intervention: Expressed Empathy/Understanding, Supported   Autonomy, Collaboration, Evocation, Permission to raise concern   or advise, Open-ended questions, Reflections: simple and complex,   Change talk (evoked) and Reframe    Patients last cigarette:  2/12/2021       Patients main reason for smoking include: Stress relief, habit,   and boredom    Top Reasons to quit smoking:  Wants to be healthy and not promote   new cancer growth. Also wants to be alive \"too young to die, I'm   only 65\" to spend time with her children (8) and grandchildren.     Quit Attempts:  none    Triggers: Other people smoking around her (the smell) and when   she is stressed. She recognizes that the coffee with a cigarette   is her biggest trigger. She is weaning off of the coffee so it   doesn't continue as an association trigger.    Types of Tobacco and Amount   Cigarettes      X   E-Cigs    Smokeless Tobacco    Cigars    Pipes    Waterpipes      Fagerstrom Test for Nicotine Dependence   How soon after waking do you smoke your first cigarette Within 5   minutes=3  5-30 minutes=2  31-60 minute=1  >61 minutes=0                                0   Do you find it difficult to refrain from smoking in places where   it is forbidden? e.g. [employer], restaurants, etc? Yes=1  No=0                             0   Which " "cigarette would you hate to give up? The first in the   morning=1  Any other=0        1        How many cigarettes a day do you smoke? 10 or less=0  11-20=1  21-30=2  31 or more=3      0   Do you smoke more frequently in the morning?   Yes=1  No=0       0   Do you smoke even if you are sick in bed most of the day? Yes=1  No=0       0                                                                                                                                             Total Score                    1   SCORE 1-2 = Low Dependence          3-4 = Low to Mod Dependence       5-7 = Moderate Dependence       8+ = High Dependence     Stage of Behavior Change:   Pre-contemplation - No intention    Contemplation - Change on the horizon    Preparation - Getting Ready        X   Action - Consistently changed (within 6 months)    Maintenance - Staying quit (more than 6 months)    Relapse - Recycling      Patients Motivation to Quit Scale    Importance (1-10):       10   Confidence  (1-10):       10   Can Patient Imagine a Future without Smoking:                        yes   Quit Attempt Date:   2/12/21   Final Quit Date:       Education/Recommendations    Education:    -Provided educational workbook, \"Quitting for Good with Treatment   and Support.\" Discussed health risks of continued smoking.   -Provided motivation and encouragement.   -Shared that it's never too late to quit and that the benefits   are immedate and profoundly impactful. Shared that slipping up   here and there doesn't necessarily mean she failed; rather, it's   an opportunity to reflect and modify her behaviors and habits and   to employ alternate strategies to deal with strong triggers.    Recommendations:   -Encouraged patient to use workbook to help her understand why   she smokes, come up with 5 reasons to quit, and to imagine what   her future looks like without smoking. -Encouraged her to develop   strategies to distract herself to get past cravings. "     Developed Smoking Cessation Relapse Prevention Plan that includes   recommendations of:       -Use 7/14 mg patch daily, continue the initial patch for 4-6   weeks of smoking abstinence based on withdrawal symptoms.Taper   every 4-6 weeks in 7 mg steps as tolerated.     -Use 2/4 mg lozenges or gum, take first thing in the morning and   as needed for cravings and urges to smoke. May be used every 1-2   hours.     -Agrees to participate in our post-hosp smoking cessation   follow-up calls for treatment and support, starting at 48 hrs   post discharge, then at 14 days, 1 month, and at 6 months.     Time Spent: I spent 40 minutes with patient. Will notify provider   of recommendations.    Gave contact information and will call 48 hours after discharge   to provide support.    SILVESTRE Mistry, RRT, CTTS  Chronic Pulmonary Disease Specialist  Office: [phone]   Pager: [phone]                Blood culture     Status: None (Preliminary result)    Specimen: Blood    Left Arm   Result Value Ref Range    Specimen Description Blood Left Arm     Culture Micro PENDING    Urine Culture     Status: None (Preliminary result)    Specimen: Urine Midstream; Midstream Urine   Result Value Ref Range    Specimen Description Midstream Urine     Special Requests Specimen received in preservative     Culture Micro PENDING        Signed:  Deborah Bosch NP, APRN CNP  February 24, 2021 at 4:08 PM

## 2021-02-24 NOTE — ED TRIAGE NOTES
Pt arrives via EMS for c/o of SOB. Pt has been experiencing this for the past 2 weeks. Pt states that Chest Pain and pressure is accompanying the SOB. Pt has been feeling very weak and dyspnea on excursion.

## 2021-02-24 NOTE — PROGRESS NOTES
"/47   Pulse 87   Temp 97.7  F (36.5  C) (Oral)   Resp 20   Ht 1.575 m (5' 2\")   Wt 91 kg (200 lb 9.9 oz)   SpO2 91%   BMI 36.69 kg/m         - Improvement of Peak Flow to greater than 70% sustained off nebulizer for 4 hours. - not met   - Dyspnea improved and oxygen saturations greater than 88% on room air or prior home oxygen levels - not met   - Vitals signs normal or at patient baseline - VSS  - Safe disposition plan has been identified - not met   - Nurse to notify provider when observation goals have been met and patient is ready for discharge.          "

## 2021-02-24 NOTE — H&P
[facility]    History and Physical - ED Observation       Date of Admission:  2/23/2021    Assessment & Plan   Jenn Aparicio is a 65 year old female admitted on 2/23/2021. She has a history of COPD on home O2 2 liter/minute, lung cancer status post right lower lobe partial lobectomy, suspected RUL [id number] IA2 NSCLC s/p SBRT 1/2020, Tobacco use, DMII, and HTN who presents via EMS with shortness of breath.    #Shortness of breath  65yr old female with several weeks of progressively worsening shortness of breath. Primary provider e-prescribed azithromycin and prednisone for patient on 2/22/21, patient states she has not started it because the medication went to her mail order pharmacy. She is on 2-2.5 liters of oxygen at home at baseline. Pt states she is now out of DuoNeb nebulizer cartridges. Pt reports increased sputum production. PFTs done in December reveal very severe airflow obstruction with significant bronchodilator response. Severe diffusion defect. In the ED, /85, HR 98, temp 97.6, RR 16, SPO2 93% on 2 liters oxygen per nasal cannula. EKG shows NSR. Chest xray shows Bibasilar streaky opacities, likely atelectasis, with small right pleural effusion. Chronic emphysematous changes. Labs show Na 141, K+ 3.5, Cr 0.66, BUN 7. Anion gap 2. Carbon dioxide 34. BNP 39. Troponin negative. CBC is unremarkable. Covid/RSV/Influenza panel is negative. Medications: Patient was given DuoNeb x2 in the ED and Solumedrol 81.25mg IV x1. She is improved but not at baseline breathing with coarse lung sounds and scattered wheezes bilaterally. Plan: Admit to ED Observation for further management of COPD exacerbation.  -[place] to ED Observation  -VS Q4hrs  -Continuous pulse oximetry  -Telemetry  -Oxygen at 2liters/min, pts baseline  -Prednisone daily  -Levaquin daily   -IVF  -DuoNeb Q4hrs  -Continue PTA Incruse ellipta, Breo    Chronic Medical Conditions  #HTN:  Last echo 1/2019  "shows EF of 60-65%. There is no left ventricular wall motion abnormality identified. Normal right ventricular size and systolic function. No significant valvular regurgitation. -Continue PTA amlodipine     #DMII, CKD Stage 3: Last A1c: 6.8 on 9/8/2020. Patient now on steroids and starting fluoroquinolone antibiotic so glucose control may be less predictable.   -HOLD glipizide while on fluoroquinolone for COPD exacerbation, given increased risk of hypoglycemia.   -Glucose checks with meals and at bedtime  -Sliding scale insulin coverage ordered    #Tobacco Abuse: Nicotine patch as needed     Diet:   regular  DVT Prophylaxis: Low Risk/Ambulatory with no VTE prophylaxis indicated  Andrade Catheter: not present  Code Status:   full         Disposition Plan   Expected discharge: Today, recommended to prior living arrangement once adequate pain management/ tolerating PO medications and O2 use less than 2 liters/minute.  Entered: Nikkie Álvarez CNP 02/23/2021, 11:47 PM     The patient's care was discussed with the Bedside Nurse, Patient and ED MD.    Nikkie Álvarez CNP  [facility]  ED Observation, [id number]  ______________________________________________________________________    Chief Complaint   Shortness of breath    History is obtained from the patient    History of Present Illness   Per ED, \"Jenn Aparicio is a 65 year old female with history of COPD on home O2 1.5 liter/minute, lung cancer status post partial lobectomy who presents via EMS with shortness of breath. Patient states that she has been having shortness of breath for the past few weeks and has been progressively getting worse and worse. She was supposed to have started steroids but there were difficulties getting this started. She continued to have shortness of breath to point where she had to increase her O2 flow rate but still couldn't get her shortness of breath under control. She did 3-4 nebs today on top of " "her usual inhalers, no improvement with this.  911 was called.  When EMS arrived they found her sitting outside her front door with heavy work of breathing. Her O2 sats were in the mid 90s on arrival.Medics put her on BiPAP 15/5, then down to 10/5.  Her breathing seemed tight with this so they gave her and albuterol neb with her BiPAP, had improvement of work of breathing with this.  End-tidal CO2 in the low 30s to the mid 40s.  Patient was initially tachycardic to 112-115, and this improved to 90 bpm after treatments.  They placed an 18-gauge IV in the right antecubital, attempted to place a second IV in her left hand without success.  Blood sugars were 109 on arrival. Here patient endorses crackling sounds with breathing, shortness of breath that worsens with exertion over the past 2 weeks.  Denies fevers.  She states she has stopped smoking.  Does not know if her weight has changed.  She states that she has not been eating, but that has been hydrating.  She endorses a cough but feels unable to expectorate any mucus.  She feels that she can use another nebulizer right now.\"    Review of Systems    Constitutional: Negative for fever.   Respiratory: Positive for cough, chest tightness, shortness of breath and wheezing.       A complete review of systems was performed with pertinent positives and negatives noted in the HPI, and all other systems negative.    Past Medical History    I have reviewed this patient's medical history and updated it with pertinent information if needed.   Past Medical History:   Diagnosis Date     Asthma      Ectopic pregnancy      Type II diabetes mellitus (H)        Past Surgical History   I have reviewed this patient's surgical history and updated it with pertinent information if needed.  History reviewed. No pertinent surgical history.    Social History   I have reviewed this patient's social history and updated it with pertinent information if needed.  Social History     Tobacco Use "     Smoking status: Former Smoker     Smokeless tobacco: Former User   Substance Use Topics     Alcohol use: Yes     Comment: sometime     Drug use: No       Family History   I have reviewed this patient's family history and updated it with pertinent information if needed.  Family History   Problem Relation Age of Onset     Depression Daughter      Alcohol/Drug Other         self     Diabetes Other         self     Thyroid Disease Other         self     Asthma Other         self       Prior to Admission Medications   Prior to Admission Medications   Prescriptions Last Dose Informant Patient Reported? Taking?   Alpha-Lipoic Acid 300 MG CAPS   Yes No   Sig: Take 300 mg by mouth daily   Aspirin (ASPIR-81 PO)   Yes No   Sig: Take  by mouth.   albuterol (PROAIR HFA/PROVENTIL HFA/VENTOLIN HFA) 108 (90 Base) MCG/ACT inhaler   No No   Sig: INHALE 1 OR 2 PUFFS BY MOUTH EVERY 4 HOURS AS NEEDED   albuterol (PROVENTIL) (2.5 MG/3ML) 0.083% neb solution   No No   Sig: Take 1 vial (2.5 mg) by nebulization every 6 hours as needed for shortness of breath / dyspnea or wheezing   amLODIPine (NORVASC) 10 MG tablet   No No   Sig: Take 1 tablet (10 mg) by mouth daily   azithromycin (ZITHROMAX) 250 MG tablet   No No   Sig: Take 2 tablets (500 mg) by mouth daily for 1 day, THEN 1 tablet (250 mg) daily for 4 days.   diphenoxylate-atropine (LOMOTIL) 2.5-0.025 MG tablet   No No   Sig: Take 1 tablet by mouth 4 times daily as needed for diarrhea   fluticasone-vilanterol (BREO ELLIPTA) 200-25 MCG/INH inhaler   No No   Sig: Inhale 1 puff into the lungs daily   glipiZIDE (GLUCOTROL XL) 2.5 MG 24 hr tablet   No No   Sig: Take 1 tablet (2.5 mg) by mouth daily   ipratropium - albuterol 0.5 mg/2.5 mg/3 mL (DUONEB) 0.5-2.5 (3) MG/3ML neb solution   No No   Sig: Take 1 vial (3 mLs) by nebulization every 6 hours as needed for shortness of breath / dyspnea or wheezing   nicotine (NICODERM CQ) 14 MG/24HR 24 hr patch   No No   Sig: Place 1 patch onto the  skin every 24 hours   predniSONE (DELTASONE) 20 MG tablet   No No   Sig: Take 1 tablet (20 mg) by mouth daily   Patient not taking: Reported on 2/12/2021   predniSONE (DELTASONE) 20 MG tablet   No No   Sig: Take 2 tablets (40 mg) by mouth daily for 5 days   sodium chloride (NEBUSAL) 3 % neb solution   No No   Sig: Take 3 mLs by nebulization daily Use 1-2 times daily in combination with Duoneb and Aerobika to help with mucus clearance   umeclidinium (INCRUSE ELLIPTA) 62.5 MCG/INH inhaler   No No   Sig: Inhale 1 puff into the lungs daily      Facility-Administered Medications: None     Allergies   Allergies   Allergen Reactions     Aspirin      325mg      Colon Care      Penicillins        Physical Exam   Vital Signs: Temp: 97.6  F (36.4  C) Temp src: Oral BP: (!) 155/85 Pulse: 101   Resp: 16 SpO2: 92 % O2 Device: Nasal cannula Oxygen Delivery: 2 LPM  Weight: 218 lbs 11.14 oz    Constitutional: awake, alert, cooperative, no apparent distress, and appears stated age  Eyes: Lids and lashes normal, pupils equal, round and reactive to light, extra ocular muscles intact, sclera clear, conjunctiva normal  ENT: Normocephalic, without obvious abnormality, atraumatic, sinuses nontender on palpation, external ears without lesions, oral pharynx with moist mucous membranes, tonsils without erythema or exudates, gums normal and good dentition.  Respiratory: No increased work of breathing, coarse bilaterally with scattered wheezes throughout.  Cardiovascular: Regular rate and rhythm, normal S1 and S2, no S3 or S4, and no murmur noted  Abdomen: Normal bowel sounds, soft, non-distended, non-tender  Skin: normal skin color, texture, turgor  Musculoskeletal: There is no redness, warmth, or swelling of the joints.  Full range of motion noted.  Motor strength is 5 out of 5 all extremities bilaterally.  Tone is normal.  Neurologic: Awake, alert, oriented to name, place and time.  Cranial nerves II-XII are grossly intact.  Motor is 5 out  of 5 bilaterally.      Data     Recent Labs   Lab 02/23/21 2021   WBC 6.5   HGB 13.5   MCV 89      INR 1.02      POTASSIUM 3.5   CHLORIDE 105   CO2 34*   BUN 7   CR 0.66   ANIONGAP 2*   LUIGI 8.7   *   ALBUMIN 3.3*   PROTTOTAL 7.0   BILITOTAL 0.1*   ALKPHOS 114   ALT 14   AST 11   TROPI <0.015     Recent Results (from the past 24 hour(s))   XR Chest Port 1 View    Narrative    Chest radiograph 2/23/2021 8:36 PM    HISTORY: Shortness of breath    COMPARISON: CT chest 12/15/2020    FINDINGS:  Single AP view of the chest. Trachea is midline. Cardiac silhouette is  within normal limits. No pneumothorax. Small right pleural effusion.  No significant left pleural effusion. Bilateral upper lobe predominant  emphysematous changes. Streaky bibasilar opacities.      Impression    IMPRESSION:  1. Bibasilar streaky opacities, likely atelectasis, with small right  pleural effusion.  2. Chronic emphysematous changes.    I have personally reviewed the examination and initial interpretation  and I agree with the findings.    EVAN PANDA MD

## 2021-02-24 NOTE — CONSULTS
"Smoking Cessation Consult   2021    Patient: Jenn Aparicio      :  1955                    MRN:[id number]      Chronic obstructive pulmonary disease, unspecified COPD type (H) [J44.9] @HX    History of Present Illness: 65 year old female admitted on 2021. She has a history of COPD on home O2 2 liter/minute, lung cancer status post right lower lobe partial lobectomy, suspected RUL [id number] IA2 NSCLC s/p SBRT 2020, Tobacco use, DMII, and HTN who presents via EMS with shortness of breath.    Reason for Consult: Patient identified as current everyday smoker per patient chart.     Patient to sharing about her tobacco use and in learning about more options and resources for quitting, staying quit, and in developing a relapse prevention plan.     Symptoms of craving or withdrawal: Patient is currently not experiencing symptoms of withdrawal such as sleeplessness, headache, anxiety, irritability, and intense cravings to smoke.      Motivational Interviewing:     MI Intervention: Expressed Empathy/Understanding, Supported Autonomy, Collaboration, Evocation, Permission to raise concern or advise, Open-ended questions, Reflections: simple and complex, Change talk (evoked) and Reframe    Patients last cigarette:  2021       Patients main reason for smoking include: Stress relief, habit, and boredom    Top Reasons to quit smoking:  Wants to be healthy and not promote new cancer growth. Also wants to be alive \"too young to die, I'm only 65\" to spend time with her children (8) and grandchildren.     Quit Attempts:  none    Triggers: Other people smoking around her (the smell) and when she is stressed. She recognizes that the coffee with a cigarette is her biggest trigger. She is weaning off of the coffee so it doesn't continue as an association trigger.    Types of Tobacco and Amount   Cigarettes      X   E-Cigs    Smokeless Tobacco    Cigars    Pipes    Waterpipes      Fagerstrom Test for Nicotine " "Dependence   How soon after waking do you smoke your first cigarette Within 5 minutes=3  5-30 minutes=2  31-60 minute=1  >61 minutes=0                                0   Do you find it difficult to refrain from smoking in places where it is forbidden? e.g. Christian, restaurants, etc? Yes=1  No=0                             0   Which cigarette would you hate to give up? The first in the morning=1  Any other=0        1        How many cigarettes a day do you smoke? 10 or less=0  11-20=1  21-30=2  31 or more=3      0   Do you smoke more frequently in the morning?   Yes=1  No=0       0   Do you smoke even if you are sick in bed most of the day? Yes=1  No=0       0                                                                                                                                         Total Score                    1   SCORE 1-2 = Low Dependence          3-4 = Low to Mod Dependence     5-7 = Moderate Dependence       8+ = High Dependence     Stage of Behavior Change:   Pre-contemplation - No intention    Contemplation - Change on the horizon    Preparation - Getting Ready        X   Action - Consistently changed (within 6 months)    Maintenance - Staying quit (more than 6 months)    Relapse - Recycling      Patients Motivation to Quit Scale    Importance (1-10):       10   Confidence  (1-10):       10   Can Patient Imagine a Future without Smoking:                      yes   Quit Attempt Date:   2/12/21   Final Quit Date:       Education/Recommendations    Education:    -Provided educational workbook, \"Quitting for Good with Treatment and Support.\" Discussed health risks of continued smoking.   -Provided motivation and encouragement.   -Shared that it's never too late to quit and that the benefits are immedate and profoundly impactful. Shared that slipping up here and there doesn't necessarily mean she failed; rather, it's an opportunity to reflect and modify her behaviors and habits and to employ alternate " strategies to deal with strong triggers.    Recommendations:   -Encouraged patient to use workbook to help her understand why she smokes, come up with 5 reasons to quit, and to imagine what her future looks like without smoking. -Encouraged her to develop strategies to distract herself to get past cravings.     Developed Smoking Cessation Relapse Prevention Plan that includes recommendations of:       -Use 7/14 mg patch daily, continue the initial patch for 4-6 weeks of smoking abstinence based on withdrawal symptoms.Taper every 4-6 weeks in 7 mg steps as tolerated.     -Use 2/4 mg lozenges or gum, take first thing in the morning and as needed for cravings and urges to smoke. May be used every 1-2 hours.     -Agrees to participate in our post-hosp smoking cessation follow-up calls for treatment and support, starting at 48 hrs post discharge, then at 14 days, 1 month, and at 6 months.     Time Spent: I spent 40 minutes with patient. Will notify provider of recommendations.    Gave contact information and will call 48 hours after discharge to provide support.    Nayana Gonzalez, SILVESTRE, RRT, CTTS  Chronic Pulmonary Disease Specialist  Office: [phone]   Pager: [phone]

## 2021-02-24 NOTE — ED NOTES
Bed: ED03  Expected date: 2/23/21  Expected time:   Means of arrival:   Comments:  N716 62F SOB, Lung CA, COPD   Red

## 2021-02-24 NOTE — PROGRESS NOTES
"BP (!) 142/73 (BP Location: Right arm)   Pulse 112   Temp 98.1  F (36.7  C) (Oral)   Resp 21   Ht 1.575 m (5' 2\")   Wt 91 kg (200 lb 9.9 oz)   SpO2 91%   BMI 36.69 kg/m        Pt triggered Critical lactic 5.3 reported per lab. MD notified. Rapid response notified.  "

## 2021-02-24 NOTE — PROGRESS NOTES
[facility]  Emergency Department Observation Unit Daily Progress Note          Assessment & Plan:   Jenn Aparicio is a 65 year old female with a history of very severe COPD (FEV1 24%) on 2L O2 (states she only uses at night), RLL IA2 [id number] adenocarcinoma s/p lobectomy 2/2016 and suspected RUL [id number] IA2 NSCLC s/p SBRT 1/2020, Tobacco use, DMII, and HTN who presented via EMS with shortness of breath.     ##COPD Exacerbation:  ##Very Severe COPD:  Primary provider e-prescribed azithromycin and prednisone for patient on 2/22/21. She was unable to start these as they went to her mail order pharmacy. She is on 2-2.5 liters of oxygen at home at baseline. She states she usually only uses this at night but per notes prescribed continuously PFTs done in December reveal very severe airflow obstruction with significant bronchodilator response. Severe diffusion defect. In the ED, /85, HR 98, temp 97.6, RR 16, SPO2 93% on 2 liters oxygen per nasal cannula. EKG shows NSR. Chest xray shows Bibasilar streaky opacities, likely atelectasis, with small right pleural effusion. Chronic emphysematous changes. Labs show Na 141, K+ 3.5, Cr 0.66, BUN 7. Anion gap 2. Carbon dioxide 34. BNP 39. Troponin negative. CBC is unremarkable. Covid/RSV/Influenza panel is negative. She was given DuoNeb x2 in the ED and Solumedrol 81.25mg IV x1. She was admitted to observation for further management of COPD exacerbation. This morning she notes ongoing dyspnea, increased from baseline. VSS on home 2 L NC.   - VS Q4hrs  - Continuous pulse oximetry  - Telemetry  - Oxygen at 2liters/min, pts baseline  - Prednisone daily  - Levaquin daily   - IVF  - DuoNeb Q4hrs  - Hold home inhaler while in observation  - COPD team consult     ##Lactic Acidosis: Lactic acid drawn due to tachycardia and increased RR. Resulted at 5.3. Rapid response was called. Likely 2/2 underlying malignancy, nebs, and COPD exacerbation. Low  "suspicion for sepsis at this time but will monitor closely. Chest x-ray as above.   - Blood Cultures x 2  - 1 L NS bolus   - Send UA/UC  - Continue Levaquin   - Repeat lactic acid at 1400      ##Lung adenocarcinoma: IA2 [id number] poorly diff adeno RLL, then [id number] IA2 suspected NSCLC RUL, medically inoperable, Follows with Oncology. Next follow-up in April, per patient.  - Follow-up out-patient      ##DMII:  ##Hyperglycemia:   Last A1c: 6.8 on 9/8/2020. Patient now on steroids and starting fluoroquinolone antibiotic so glucose control may be less predictable.   -Resume glipizide but monitor while on fluoroquinolone for COPD exacerbation given increased risk of hypoglycemia.   -Glucose checks with meals and at bedtime  -Sliding scale insulin coverage ordered     ##CKD Stage 3: Creatinine stable, monitor      #HTN:  Last echo 1/2019 shows EF of 60-65%. There is no left ventricular wall motion abnormality identified. Normal right ventricular size and systolic function. No significant valvular regurgitation.   -Continue PTA amlodipine     ##Tobacco Abuse: Nicotine patch     FEN: ADAT  Lines: PIV  Prophylaxis: Early ambulation          Consults:   Rapid Response Team  COPD team            Discharge Planning:   Pending improved respiratory status. Anticipate another overnight. Transition to in-patient status if no improvement.         Interval History:   Resting in bed this am. Notes ongoing shortness of breath. Does not feel like she is at her baseline.     ROS:   Constitutional: No fevers/chills. Tolerating diet.   Cardiovascular: No chest pain or palpitations.   GI: No abdominal pain. No N/V.      : No urinary complaints.   Musculoskeletal: Denies pain.           Physical Exam:   BP (!) 137/97 (BP Location: Right arm)   Pulse 112   Temp 98.1  F (36.7  C) (Oral)   Resp 22   Ht 1.575 m (5' 2\")   Wt 91 kg (200 lb 9.9 oz)   SpO2 96%   BMI 36.69 kg/m       GENERAL: Alert and oriented x 3. NAD.   HEENT: Anicteric " sclera. Mucous membranes moist.   CV: RRR. S1, S2. No murmurs appreciated.   RESPIRATORY: Effort normal at rest, labored with activity. Lungs diminished with expiratory wheezes. .  GI: Abdomen soft and non distended with normoactive bowel sounds present in all quadrants. No tenderness, rebound, guarding.   NEUROLOGICAL: No focal deficits. Moves all extremities.    EXTREMITIES: No peripheral edema. Intact bilateral pedal pulses.   SKIN: No jaundice. No rashes.     Medication list reviewed.   Today's labs and imaging were reviewed.     BENOIT Parada, CNP  Emergency Department Observation Unit    Results for orders placed or performed during the hospital encounter of 02/23/21   XR Chest Port 1 View     Status: None    Narrative    Chest radiograph 2/23/2021 8:36 PM    HISTORY: Shortness of breath    COMPARISON: CT chest 12/15/2020    FINDINGS:  Single AP view of the chest. Trachea is midline. Cardiac silhouette is  within normal limits. No pneumothorax. Small right pleural effusion.  No significant left pleural effusion. Bilateral upper lobe predominant  emphysematous changes. Streaky bibasilar opacities.      Impression    IMPRESSION:  1. Bibasilar streaky opacities, likely atelectasis, with small right  pleural effusion.  2. Chronic emphysematous changes.    I have personally reviewed the examination and initial interpretation  and I agree with the findings.    EVAN PANDA MD   CBC with platelets differential     Status: Abnormal   Result Value Ref Range    WBC 6.5 4.0 - 11.0 10e9/L    RBC Count 4.85 3.8 - 5.2 10e12/L    Hemoglobin 13.5 11.7 - 15.7 g/dL    Hematocrit 43.3 35.0 - 47.0 %    MCV 89 78 - 100 fl    MCH 27.8 26.5 - 33.0 pg    MCHC 31.2 (L) 31.5 - 36.5 g/dL    RDW 13.1 10.0 - 15.0 %    Platelet Count 211 150 - 450 10e9/L    Diff Method Automated Method     % Neutrophils 49.3 %    % Lymphocytes 32.9 %    % Monocytes 11.3 %    % Eosinophils 5.8 %    % Basophils 0.5 %    % Immature Granulocytes 0.2 %     Nucleated RBCs 0 0 /100    Absolute Neutrophil 3.2 1.6 - 8.3 10e9/L    Absolute Lymphocytes 2.2 0.8 - 5.3 10e9/L    Absolute Monocytes 0.7 0.0 - 1.3 10e9/L    Absolute Eosinophils 0.4 0.0 - 0.7 10e9/L    Absolute Basophils 0.0 0.0 - 0.2 10e9/L    Abs Immature Granulocytes 0.0 0 - 0.4 10e9/L    Absolute Nucleated RBC 0.0    D dimer quantitative     Status: None   Result Value Ref Range    D Dimer <0.3 0.0 - 0.50 ug/ml FEU   INR     Status: None   Result Value Ref Range    INR 1.02 0.86 - 1.14   Comprehensive metabolic panel     Status: Abnormal   Result Value Ref Range    Sodium 141 133 - 144 mmol/L    Potassium 3.5 3.4 - 5.3 mmol/L    Chloride 105 94 - 109 mmol/L    Carbon Dioxide 34 (H) 20 - 32 mmol/L    Anion Gap 2 (L) 3 - 14 mmol/L    Glucose 110 (H) 70 - 99 mg/dL    Urea Nitrogen 7 7 - 30 mg/dL    Creatinine 0.66 0.52 - 1.04 mg/dL    GFR Estimate >90 >60 mL/min/[1.73_m2]    GFR Estimate If Black >90 >60 mL/min/[1.73_m2]    Calcium 8.7 8.5 - 10.1 mg/dL    Bilirubin Total 0.1 (L) 0.2 - 1.3 mg/dL    Albumin 3.3 (L) 3.4 - 5.0 g/dL    Protein Total 7.0 6.8 - 8.8 g/dL    Alkaline Phosphatase 114 40 - 150 U/L    ALT 14 0 - 50 U/L    AST 11 0 - 45 U/L   Troponin I     Status: None   Result Value Ref Range    Troponin I ES <0.015 0.000 - 0.045 ug/L   Nt probnp inpatient (BNP)     Status: None   Result Value Ref Range    N-Terminal Pro BNP Inpatient 39 0 - 900 pg/mL   Symptomatic Influenza A/B & SARS-CoV2 (COVID-19) Virus PCR Multiplex     Status: None    Specimen: Nasopharyngeal   Result Value Ref Range    Flu A/B & SARS-COV-2 PCR Source Nasopharyngeal     SARS-CoV-2 PCR Result NEGATIVE     Influenza A PCR Negative NEG^Negative    Influenza B PCR Negative NEG^Negative    Respiratory Syncytial Virus PCR Negative NEG^Negative    Flu A/B & SARS-CoV-2 PCR Comment (Note)    Glucose by meter     Status: Abnormal   Result Value Ref Range    Glucose 276 (H) 70 - 99 mg/dL   CBC with platelets     Status: Abnormal   Result  Value Ref Range    WBC 7.8 4.0 - 11.0 10e9/L    RBC Count 4.88 3.8 - 5.2 10e12/L    Hemoglobin 13.7 11.7 - 15.7 g/dL    Hematocrit 44.5 35.0 - 47.0 %    MCV 91 78 - 100 fl    MCH 28.1 26.5 - 33.0 pg    MCHC 30.8 (L) 31.5 - 36.5 g/dL    RDW 13.1 10.0 - 15.0 %    Platelet Count 196 150 - 450 10e9/L   Basic metabolic panel     Status: Abnormal   Result Value Ref Range    Sodium 138 133 - 144 mmol/L    Potassium 4.1 3.4 - 5.3 mmol/L    Chloride 102 94 - 109 mmol/L    Carbon Dioxide 28 20 - 32 mmol/L    Anion Gap 7 3 - 14 mmol/L    Glucose 290 (H) 70 - 99 mg/dL    Urea Nitrogen 12 7 - 30 mg/dL    Creatinine 0.55 0.52 - 1.04 mg/dL    GFR Estimate >90 >60 mL/min/[1.73_m2]    GFR Estimate If Black >90 >60 mL/min/[1.73_m2]    Calcium 9.4 8.5 - 10.1 mg/dL   Hemoglobin A1c     Status: Abnormal   Result Value Ref Range    Hemoglobin A1C 7.0 (H) 0 - 5.6 %   Glucose by meter     Status: Abnormal   Result Value Ref Range    Glucose 259 (H) 70 - 99 mg/dL   Glucose by meter     Status: Abnormal   Result Value Ref Range    Glucose 228 (H) 70 - 99 mg/dL   Lactic acid level STAT     Status: Abnormal   Result Value Ref Range    Lactate for Sepsis Protocol 5.3 (HH) 0.7 - 2.0 mmol/L   Glucose by meter     Status: Abnormal   Result Value Ref Range    Glucose 227 (H) 70 - 99 mg/dL   EKG 12-lead, tracing only     Status: None   Result Value Ref Range    Interpretation ECG Click View Image link to view waveform and result    Care Management / Social Work IP Consult     Status: None ()    Stacie La, RN     2/24/2021 12:12 PM  Care Management Initial Consult    General Information  Assessment completed with: Patient  Type of CM/SW Visit: Initial Assessment  Primary Care Provider verified and updated as needed: Yes   Readmission within the last 30 days: no previous admission in   last 30 days   Reason for Consult: discharge planning  Advance Care Planning: No ACP documents on file, declines at this   time.      Communication  Assessment  Patient's communication style: spoken language (English or   Bilingual)    Hearing Difficulty or Deaf: no   Wear Glasses or Blind: no    Cognitive  Cognitive/Neuro/Behavioral: WDL                      Living Environment:   People in home: alone     Current living Arrangements: apartment      Able to return to prior arrangements: yes    Family/Social Support:  Care provided by: self, other (see comments)(PCA)  Provides care for: no one  Marital Status: Single  Support System: Children          Description of Support System: Supportive, Involved      Current Resources:   Patient receiving home care services: No  Community Resources: County Worker, PCA  Equipment currently used at home: walker, rolling  Supplies currently used at home: Oxygen Tubing/Supplies,   nebulizer    Employment/Financial:  Employment Status: disabled     Financial Concerns: No concerns identified     Lifestyle & Psychosocial Needs:    Socioeconomic History     Marital status: Single     Spouse name: Not on file     Number of children: Not on file     Years of education: Not on file     Highest education level: Not on file     Tobacco Use     Smoking status: Current Every Day Smoker     Packs/day: 0.25     Types: Cigarettes     Smokeless tobacco: Former User     Tobacco comment: 2/24/2021 Patient has quit smoking as of   2/12/2012. Will not be considered as fully quit until smoke-free   for 6 months   Substance and Sexual Activity     Alcohol use: Yes     Comment: sometime     Drug use: No     Sexual activity: Not Currently     Partners: Male       Functional Status:  Prior to admission patient needed assistance: Independent, needs   assistance w/some ADLS when not feeling well.      Mental Health Status: Not discussed at this time.     Chemical Dependency Status: Not discussed at this time.        Values/Beliefs:  Spiritual, Cultural Beliefs, Christian Practices, Values that   affect care: no         Additional Information:  Patient  lives locally, alone and mostly independent. Patient   states that she has been trying really hard to not come to the   hospital and has found herself quite short of breath and needing   more help than usual from her adult children. Patient notes that   she has PCA services, unclear how many hours she has or if these   hours are fully staffed. Patient is on home oxygen through   [facility], states that she is unhappy with her   portability (too difficult to fill tanks, has requested a   concentrator, has not received and has not been please w/[facility]). Patient notes that she has a nebulizer that is   quite old and seems to be over heating, provider placed order for   new nebulizer, will contact [facility] to arrange at   discharge. Patient notes that she will likely need to take a   medical cab at discharge and will need portable oxygen, as she   came into the hospital via EMS.    DME:   [facility] (nebulizer)  Phone: [phone]    [facility] (PTA Home O2)  Phone: [phone]  Fax: [phone]    Discharge transportation:  [facility] Ride: [phone]  Or  [facility] w/c ride w/portable O2: [phone]    BENJAMIN completed w/patient. RNCC will continue to follow for   discharge planning.     Stacie Bermeo, RNCC, BSN    [facility]    Medicine Group  [address] [place] [id number]    [email or web address]    Office: [phone] Pager: [phone]  To contact the weekend RNCC, page [phone].          RT Chronic Pulmonary Disease Specialist Consult     Status: None ()    Nayana Garza, RT     2021 11:47 AM  Chronic Pulmonary Disease Specialist Consult   COPD Initial Interview    2021    Patient: Jenn Aparicio      :  1955                      MRN:[id number]      Reason for Consult:  Patient with severe COPD being followed by   COPD Readmission Reduction  Program    History of Present Illness: Jenn Aparicio is a 65 year old female   admitted on 2/23/2021. She has a history of COPD on home O2 2   liter/minute, lung cancer status post right lower lobe partial   lobectomy, suspected RUL [id number] IA2 NSCLC s/p SBRT 1/2020,   Tobacco use, DMII, and HTN who presents via EMS with shortness of   breath.    Most recent hospitalization and reason:         4/28/2020  ED visit for median nerve dysfunction    10/18/2019  Hospital admission   Acute respiratory failure with   hypoxemia    Smoking Hx:  .25 PPD - .50 PPD  Jenn has quit smoking as of   02/12/2021. She is using a 14 mg nicotine patch. She declines gum   and lozenges.      Diagnosis of COPD:    Severe obstruction with air trapping and   hyperinflation     Pulmonologist/Last office visit   2/12/2021 Virtual visit with   Dr. Lynda Spann at [facility].    Most recent  PFT/interpretation on:   12/15/2020                   Pre Bronchodilator   Post Bronchodilator  FVC           1.32 L   44%    FEV1           0.57 L   24%    FEV1/FVC   (Ratio)                      44%                                 DLCO                   38%    Interpretation       The FVC, FEV1 and FEV1/FVC ratio are reduced.  The inspiratory   flow rates are reduced.  The TLC, RV, FRC and RV/TLC ratio are   all increased indicating overinflation and air trapping.    Following administration of bronchodilators, there is a   significant response.  The diffusing capacity is reduced.   IMPRESSION:   Very Severe Airflow Obstruction with a significant bronchodilator   response.     Severe diffusion defect.    Relevant Sleep Studies:  Has never had sleep study and does   acknowledge sleep difficulty and waking gasping for air.    Home Oxygen Use:   1.5 L - 2.5 L Continuous    States that she   has attempted to get portable oxygen devices that are easier to   use than her tanks. She is unable to change the regulator due to   hand  weakness.    Most recent Chest X-ray:  2/23/2021    Trachea is midline.   Cardiac silhouette is  within normal limits. No pneumothorax. Small right pleural   effusion. No significant left pleural effusion. Bilateral upper   lobe predominant emphysematous changes. Streaky bibasilar   opacities.    Most recent CT:  12/15/2020     Postsurgical changes of right   lower lobectomy. There are some subpleural postradiation changes   posterior lateral right upper lobe with underlying nodularity   measuring 7 x 6 mm, previously 7 x 6 mm. Small calcified   granuloma in the medial right upper lobe on image 97. No new or   enlarging solid pulmonary nodules.     Central airways are clear. No pneumothorax. No pleural effusion.   No  new focal consolidation. There are moderate apical predominant  centrilobular emphysematous changes.       Patient up-to-date on Annual Influenza/ Pneumococcal Vaccination:    She has had her Influenza vaccine this year and is up to date   with Pneumococcal vaccine.     Pulmonary Rehab History:   Not attended     Home respiratory medications include:      Albuterol MDI   2 puffs Q 4 prn  Albuterol nebulizer Q 6 prn  Breo Ellipta   1 puff Daily  Incruse Ellipta 1 puff Daily  Duonebs   Q 6 prn  Sodium chloride 3% neb           Assessment:    Jenn was found reclining in bed with audible   wheezing and visible shortness of breath. She was on 2 L NC with   oxygen saturation 98%.        Action:         Evaluated patients inspiratory flow using In-Check device:     --Low resistance setting: Patient able to [id number] LPM, which   adequate for her rescue Albuterol MDI.  Albuterol inhalers   require 20-30 LPM for optimal drug disposition.      --Medium resistance setting: Patient able to generate 35 LPM,   which is sufficient inspiratory flow for her current Breo and   Incruse DPI/MDI.        -Evaluated patients coordination and technique with inhaler:   Patient demonstrated good technique with all of her  inhalers.   Educated patient on proper inspiratory flows needed for all of   her inhalers. Provided and instructed patient on proper use of   Aerochamber spacer with inhaler; reiterated that it should always   be used with her rescue inhaler.       -Patient able to generate a pressure of 10-15 cm H2O on Aerobika   OPEP device for 2-3 seconds generating good strong   productive/non-productive cough. The goal for each breath, for a   total of 3 sets of 10 breaths, is to exhale at a of pressure   10-20 for 3-4 seconds without fatigue. Educated patient to   perform 2 to 3 'marcelino coughs'  to clear airway after each set.   Shared that device can be used with or without nebs. Shared that   consistent use of this device will help open smaller airways,   improve mucus clearance, decrease cough frequency, and improve   exercise tolerance.     Recommendations:  -Continue with current inpatient respiratory medication schedule.       -Will need follow up appointment with Pulmonologist. Saw Dr. Spann at the [facility] on 2/12/2021. She   is to return in 3 months. I let her know that when she returns   home she should call clinic right away to schedule this visit as   it may take awhile to get an appointment.     -Use Aerobika Oscillating PEP Device for 3 sets of 10 breaths two   times daily as directed above    -14 mg Nicotine Patch to help reduce symptoms of withdrawal and   cravings   -Smoking Cessation Counseling and Relapse Prevention Consult   (provided during this visit)  -2mg/4mg Nicotine Lozenges/Gum for cravings and urges   Does not   like the taste and will not use    -Referral for outpatient pulmonary Rehab    -Referral for OP sleep consult to assess for sleep disordered   breathing, DAVID, nocturnal hypoxia, and hypoventilation    -Patient needs nebulizer compressor at discharge with   prescription for tubing, cups and mask. Per insurance   requirements, Physician documentation in F2F notes needs  "to   include dx diagnosis and need for nebulizer and medication   frequency.    -Home Oxygen Assessment Testing 24-48 hours prior to discharge to   determine O2 needs at rest and with exertion/activity. If the   patient requires oxygen at rest, the walk test must be performed   using the new baseline O2 to determine if patient needs more   oxygen with activity.   -In the event patient qualifies for oxygen, at rest or with   activity, please use the following verbiage in oxygen order:   \"Please provide portable oxygen concentrator AND please test for   oxygen conserving device using ____LPM to maintain sats between   ____)    -At discharge continue with patient's home regimen of respiratory   medications.  Jenn did not receive her Duoneb solution and her Breo Ellipta   after her 2/12/2021 pulmonary visit as the prescription was sent   to a home order company in [place] that is not her preferred   pharmacy. Please send home with a new prescription for both of   these.      Will continue to follow and support patient as needed. Will   follow up with phone call 48 hours after discharge.     I spent 60 minutes with the patient.    SILVESTRE Mistry, RRT, CTTS  Chronic Pulmonary Disease Specialist  Office: [phone]   Pager: [phone]              Care Management / Social Work IP Consult     Status: None ()    Stacie La RN     2/24/2021 12:12 PM  Care Management Initial Consult    General Information  Assessment completed with: Patient  Type of CM/SW Visit: Initial Assessment  Primary Care Provider verified and updated as needed: Yes   Readmission within the last 30 days: no previous admission in   last 30 days   Reason for Consult: discharge planning  Advance Care Planning: No ACP documents on file, declines at this   time.      Communication Assessment  Patient's communication style: spoken language (English or   Bilingual)    Hearing Difficulty or Deaf: no   Wear Glasses or Blind: " no    Cognitive  Cognitive/Neuro/Behavioral: WDL                      Living Environment:   People in home: alone     Current living Arrangements: apartment      Able to return to prior arrangements: yes    Family/Social Support:  Care provided by: self, other (see comments)(PCA)  Provides care for: no one  Marital Status: Single  Support System: Children          Description of Support System: Supportive, Involved      Current Resources:   Patient receiving home care services: No  Community Resources: County Worker, PCA  Equipment currently used at home: walker, rolling  Supplies currently used at home: Oxygen Tubing/Supplies,   nebulizer    Employment/Financial:  Employment Status: disabled     Financial Concerns: No concerns identified     Lifestyle & Psychosocial Needs:    Socioeconomic History     Marital status: Single     Spouse name: Not on file     Number of children: Not on file     Years of education: Not on file     Highest education level: Not on file     Tobacco Use     Smoking status: Current Every Day Smoker     Packs/day: 0.25     Types: Cigarettes     Smokeless tobacco: Former User     Tobacco comment: 2/24/2021 Patient has quit smoking as of   2/12/2012. Will not be considered as fully quit until smoke-free   for 6 months   Substance and Sexual Activity     Alcohol use: Yes     Comment: sometime     Drug use: No     Sexual activity: Not Currently     Partners: Male       Functional Status:  Prior to admission patient needed assistance: Independent, needs   assistance w/some ADLS when not feeling well.      Mental Health Status: Not discussed at this time.     Chemical Dependency Status: Not discussed at this time.        Values/Beliefs:  Spiritual, Cultural Beliefs, Anabaptist Practices, Values that   affect care: no         Additional Information:  Patient lives locally, alone and mostly independent. Patient   states that she has been trying really hard to not come to the   hospital and has found  herself quite short of breath and needing   more help than usual from her adult children. Patient notes that   she has PCA services, unclear how many hours she has or if these   hours are fully staffed. Patient is on home oxygen through   [facility], states that she is unhappy with her   portability (too difficult to fill tanks, has requested a   concentrator, has not received and has not been please w/[facility]). Patient notes that she has a nebulizer that is   quite old and seems to be over heating, provider placed order for   new nebulizer, will contact [facility] [facility] to arrange at   discharge. Patient notes that she will likely need to take a   medical cab at discharge and will need portable oxygen, as she   came into the hospital via EMS.    DME:   [facility] (nebulizer)  Phone: [phone]    [facility] (PTA Home O2)  Phone: [phone]  Fax: [phone]    Discharge transportation:  [employer] Ride: [phone]  Or  [facility] w/c ride w/portable O2: [phone]    BENJAMIN completed w/patient. RNCC will continue to follow for   discharge planning.     Stacie Bermeo, RNCC, BSN    [facility]    Medicine Group  [address] [place] [address]    [email or web address]    Office: [phone] Pager: [phone]  To contact the weekend RNCC, page [phone].                Addendum 1500: repeat lactic acid 4.4. No signs of sepsis. Will give another 1 L NS bolus. Patient states she is dry. Very minimal urine out-put today, despite IVF. She notes chest pain to her central chest. EKG unchanged. This could be 2/2 COPD exacerbation. Patient with continued RUSSELL. Have asked UR to review her case.   -Troponin now  -1 L NS bolus  -Repeat lactic acid in the am     BENOIT Parada, CNP  Emergency Department Observation Unit

## 2021-02-24 NOTE — PROGRESS NOTES
Lactic recheck 4.4, pt is afebrile. Continues to be tachycardic with RUSSELL. OVSS. Eating drinking well. Reports heartburn. Rapid response requested. MD notified. 12 L EKG ordered.

## 2021-02-24 NOTE — PLAN OF CARE
"/68 (BP Location: Left arm)   Pulse 87   Temp 97.9  F (36.6  C) (Oral)   Resp 18   Ht 1.575 m (5' 2\")   Wt 91 kg (200 lb 9.9 oz)   SpO2 98%   BMI 36.69 kg/m      - Improvement of Peak Flow to greater than 70% sustained off nebulizer for 4 hours. - not met   - Dyspnea improved and oxygen saturations greater than 88% on room air or prior home oxygen levels - not met   - Vitals signs normal or at patient baseline - VSS  - Safe disposition plan has been identified - not met   - Nurse to notify provider when observation goals have been met and patient is ready for discharge.  "

## 2021-02-24 NOTE — CODE/RAPID RESPONSE
Rapid Response Team Note    Assessment   In assessment a rapid response was called on Jenn Aparicio due to lactic acidosis. This presentation is likely due to exacerbation of COPD and worsened by lung cancer and recent nebulizer treatment..     Plan   -  1 liter bolus crystalloid, pan culture, repeat lactate at 14:00  -  The observation primary team was present in room upon discussion  -  Disposition: The patient will remain on the current unit. We will continue to monitor this patient closely.  -  Reassessment and plan follow-up will be performed by the primary team    Iza Agee NP    Hospital Course   Brief Summary of events leading to rapid response:   65 year old female with COPD exacerbation, PMH includes DM2, tobacco use, HTN and lung cancer s/p right lower lobectomy with noted new RUL malignancy pending further treatment in April. SIRS/SEPSIS trigger for tachycardia and increased RR with resultant lactate of 5.3. She is in no acute distress and hemodynamically stable.    Admission Diagnosis:   Chronic obstructive pulmonary disease, unspecified COPD type (H) [J44.9]     Physical Exam   Temp: 98.1  F (36.7  C) Temp  Min: 97.6  F (36.4  C)  Max: 98.1  F (36.7  C)  Resp: 22 Resp  Min: 16  Max: 22  SpO2: 96 % SpO2  Min: 91 %  Max: 98 %  Pulse: 112 Pulse  Min: 87  Max: 112    No data recorded  BP: (Abnormal) 137/97 Systolic (24hrs), Av , Min:114 , Max:155   Diastolic (24hrs), Av, Min:47, Max:97     I/Os: No intake/output data recorded.     Exam:   General: in no acute distress  Mental Status: AAOx4.      Significant Results and Procedures   Lactic Acid:   Recent Labs   Lab Test 21  1110   LACTS 5.3*     CBC:   Recent Labs   Lab Test 21  0631 21  2021 12/15/20  0759   WBC 7.8 6.5 6.3   HGB 13.7 13.5 14.7   HCT 44.5 43.3 47.2*    211 202        Sepsis Evaluation   The patient is not known to have an infection. She is being empirically treated with Levaquin for presumed  pneumonia.  Jenn Aparicio meets SIRS criteria AND has a lactate >2 or other evidence of acute organ damage.  These vital signs, lab and physical exam findings are consistent with SEVERE SEPSIS.    Sepsis Time-Zero 02/24/21 as this was the time when Lactate resulted, and the level was > 2.0. Some could be attributed to known malignancy and recent nebulizer use.     Anti-infectives (From now, onward)    Start     Dose/Rate Route Frequency Ordered Stop    02/24/21 0800  levofloxacin (LEVAQUIN) tablet 500 mg      500 mg Oral DAILY 02/24/21 0237 03/03/21 0759        Current antibiotic coverage is appropriate for source of infection.    3 Hour Severe Sepsis Bundle Completion:  1. Initial Lactic Acid result shown above. Repeat lactic acid ordered for 2 hours from now.   2. Blood Cultures before Antibiotics: Yes  3. Broad Spectrum Antibiotics Administered: yes  4. Fluids: 1000 mL fluids ORDERED to be given

## 2021-02-24 NOTE — CONSULTS
Care Management Initial Consult    General Information  Assessment completed with: Patient  Type of CM/SW Visit: Initial Assessment  Primary Care Provider verified and updated as needed: Yes   Readmission within the last 30 days: no previous admission in last 30 days   Reason for Consult: discharge planning  Advance Care Planning: No ACP documents on file, declines at this time.      Communication Assessment  Patient's communication style: spoken language (English or Bilingual)    Hearing Difficulty or Deaf: no   Wear Glasses or Blind: no    Cognitive  Cognitive/Neuro/Behavioral: WDL                      Living Environment:   People in home: alone     Current living Arrangements: apartment      Able to return to prior arrangements: yes    Family/Social Support:  Care provided by: self, other (see comments)(PCA)  Provides care for: no one  Marital Status: Single  Support System: Children          Description of Support System: Supportive, Involved      Current Resources:   Patient receiving home care services: No  Community Resources: County Worker, PCA  Equipment currently used at home: walker, rolling  Supplies currently used at home: Oxygen Tubing/Supplies, nebulizer    Employment/Financial:  Employment Status: disabled     Financial Concerns: No concerns identified     Lifestyle & Psychosocial Needs:    Socioeconomic History     Marital status: Single     Spouse name: Not on file     Number of children: Not on file     Years of education: Not on file     Highest education level: Not on file     Tobacco Use     Smoking status: Current Every Day Smoker     Packs/day: 0.25     Types: Cigarettes     Smokeless tobacco: Former User     Tobacco comment: 2/24/2021 Patient has quit smoking as of 2/12/2012. Will not be considered as fully quit until smoke-free for 6 months   Substance and Sexual Activity     Alcohol use: Yes     Comment: sometime     Drug use: No     Sexual activity: Not Currently     Partners: Male        Functional Status:  Prior to admission patient needed assistance: Independent, needs assistance w/some ADLS when not feeling well.      Mental Health Status: Not discussed at this time.     Chemical Dependency Status: Not discussed at this time.        Values/Beliefs:  Spiritual, Cultural Beliefs, Presybeterian Practices, Values that affect care: no         Additional Information:  Patient lives locally, alone and mostly independent. Patient states that she has been trying really hard to not come to the hospital and has found herself quite short of breath and needing more help than usual from her adult children. Patient notes that she has PCA services, unclear how many hours she has or if these hours are fully staffed. Patient is on home oxygen through [facility], states that she is unhappy with her portability (too difficult to fill tanks, has requested a concentrator, has not received and has not been please w/ Respiratory). Patient notes that she has a nebulizer that is quite old and seems to be over heating, provider placed order for new nebulizer, will contact [facility] to arrange at discharge. Patient notes that she will likely need to take a medical cab at discharge and will need portable oxygen, as she came into the hospital via EMS.    DME:   [facility] (new nebulizer)  Phone: [phone]    [facility] (PTA Home O2)  Phone: [phone]  Fax: [phone]    Discharge transportation:  [employer]: [phone]  or  [facility] w/c ride w/portable O2: [phone]    BENJAMIN completed w/patient. RNCC will continue to follow for discharge planning.     1405 Addendum:   [facility] on call line contacted at this time for set up of new nebulizer.     1440 Addendum:  [facility] has received order for new nebulizer, RNCC will need to call on day of discharge for delivery.     Stacie Bermeo, RNCC, BSN    [facility]     Medicine Group  [address] [place] [id number]    [email or web address]    Office: [phone] Pager: [phone]  To contact the [facility] RNCC, page [phone].

## 2021-02-24 NOTE — PROVIDER NOTIFICATION
Obs 6 Jennosmani Hdze - Patient requesting pain medication. Gave her tylenol and maalox and that didn't help with her pain. Could you please come see her? [phone]

## 2021-02-24 NOTE — CONSULTS
Chronic Pulmonary Disease Specialist Consult   COPD Initial Interview    2021    Patient: Jenn Aparicio      :  1955                    MRN:[id number]      Reason for Consult:  Patient with severe COPD being followed by COPD Readmission Reduction Program    History of Present Illness: Jenn Aparicio is a 65 year old female admitted on 2021. She has a history of COPD on home O2 2 liter/minute, lung cancer status post right lower lobe partial lobectomy, suspected RUL [id number] IA2 NSCLC s/p SBRT 2020, Tobacco use, DMII, and HTN who presents via EMS with shortness of breath.    Most recent hospitalization and reason:         2020  ED visit for median nerve dysfunction    10/18/2019  Hospital admission   Acute respiratory failure with hypoxemia    Smoking Hx:  .25 PPD - .50 PPD  Jenn has quit smoking as of 2021. She is using a 14 mg nicotine patch. She declines gum and lozenges.      Diagnosis of COPD:    Severe obstruction with air trapping and hyperinflation     Pulmonologist/Last office visit   2021 Virtual visit with Dr. Lynda Spann at [facility].    Most recent  PFT/interpretation on:   12/15/2020                   Pre Bronchodilator   Post Bronchodilator  FVC           1.32 L   44%    FEV1           0.57 L   24%    FEV1/FVC   (Ratio)                      44%                                 DLCO                   38%    Interpretation       The FVC, FEV1 and FEV1/FVC ratio are reduced.  The inspiratory flow rates are reduced.  The TLC, RV, FRC and RV/TLC ratio are all increased indicating overinflation and air trapping.  Following administration of bronchodilators, there is a significant response.  The diffusing capacity is reduced.   IMPRESSION:   Very Severe Airflow Obstruction with a significant bronchodilator response.     Severe diffusion defect.    Relevant Sleep Studies:  Has never had sleep study and does acknowledge sleep difficulty and waking gasping for  air.    Home Oxygen Use:   1.5 L - 2.5 L Continuous    States that she has attempted to get portable oxygen devices that are easier to use than her tanks. She is unable to change the regulator due to hand weakness.    Most recent Chest X-ray:  2/23/2021    Trachea is midline. Cardiac silhouette is  within normal limits. No pneumothorax. Small right pleural effusion. No significant left pleural effusion. Bilateral upper lobe predominant emphysematous changes. Streaky bibasilar opacities.    Most recent CT:  12/15/2020     Postsurgical changes of right lower lobectomy. There are some subpleural postradiation changes posterior lateral right upper lobe with underlying nodularity measuring 7 x 6 mm, previously 7 x 6 mm. Small calcified granuloma in the medial right upper lobe on image 97. No new or enlarging solid pulmonary nodules.     Central airways are clear. No pneumothorax. No pleural effusion. No  new focal consolidation. There are moderate apical predominant  centrilobular emphysematous changes.       Patient up-to-date on Annual Influenza/ Pneumococcal Vaccination:  She has had her Influenza vaccine this year and is up to date with Pneumococcal vaccine.     Pulmonary Rehab History:   Not attended     Home respiratory medications include:      Albuterol MDI   2 puffs Q 4 prn  Albuterol nebulizer Q 6 prn  Breo Ellipta   1 puff Daily  Incruse Ellipta 1 puff Daily  Duonebs   Q 6 prn  Sodium chloride 3% neb           Assessment:    Jenn was found reclining in bed with audible wheezing and visible shortness of breath. She was on 2 L NC with oxygen saturation 98%.        Action:       Evaluated patients inspiratory flow using In-Check device:     --Low resistance setting: Patient able to [id number] LPM, which adequate for her rescue Albuterol MDI.  Albuterol inhalers require 20-30 LPM for optimal drug disposition.      --Medium resistance setting: Patient able to generate 35 LPM, which is sufficient inspiratory flow  for her current Breo and Incruse DPI/MDI.        -Evaluated patients coordination and technique with inhaler: Patient demonstrated good technique with all of her inhalers. Educated patient on proper inspiratory flows needed for all of her inhalers. Provided and instructed patient on proper use of Aerochamber spacer with inhaler; reiterated that it should always be used with her rescue inhaler.       -Patient able to generate a pressure of 10-15 cm H2O on Aerobika OPEP device for 2-3 seconds generating good strong productive/non-productive cough. The goal for each breath, for a total of 3 sets of 10 breaths, is to exhale at a of pressure 10-20 for 3-4 seconds without fatigue. Educated patient to perform 2 to 3 'marcelino coughs'  to clear airway after each set. Shared that device can be used with or without nebs. Shared that consistent use of this device will help open smaller airways, improve mucus clearance, decrease cough frequency, and improve exercise tolerance.     Recommendations:  -Change current inpatient respiratory medications to restart her home Incruse and Breo Ellipta's and change the scheduled Duonebs to Albuterol.     -Obtain Pulmonary Consult due to patient's continued need for oxygen and her shortness of breath despite steroids.    -Will need follow up appointment with Pulmonologist. Saw Dr. Spann at the [facility] on 2/12/2021. She is to return in 3 months. I let her know that when she returns home she should call clinic right away to schedule this visit as it may take awhile to get an appointment.     -Use Aerobika Oscillating PEP Device for 3 sets of 10 breaths two times daily as directed above    -14 mg Nicotine Patch to help reduce symptoms of withdrawal and cravings   -Smoking Cessation Counseling and Relapse Prevention Consult (provided during this visit)  -2mg/4mg Nicotine Lozenges/Gum for cravings and urges   Does not like the taste and will not use    -Referral for  "outpatient pulmonary Rehab    -Referral for OP sleep consult to assess for sleep disordered breathing, DAVID, nocturnal hypoxia, and hypoventilation    -Patient needs nebulizer compressor at discharge with prescription for tubing, cups and mask. Per insurance requirements, Physician documentation in F2F notes needs to include dx diagnosis and need for nebulizer and medication frequency.    -Home Oxygen Assessment Testing 24-48 hours prior to discharge to determine O2 needs at rest and with exertion/activity. If the patient requires oxygen at rest, the walk test must be performed using the new baseline O2 to determine if patient needs more oxygen with activity.   -In the event patient qualifies for oxygen, at rest or with activity, please use the following verbiage in oxygen order: \"Please provide portable oxygen concentrator AND please test for oxygen conserving device using ____LPM to maintain sats between ____)    -At discharge continue with patient's home regimen of respiratory medications.  Jenn did not receive her Duoneb solution and her Breo Ellipta after her 2/12/2021 pulmonary visit as the prescription was sent to a home order company in [place] that is not her preferred pharmacy. Please send home with a new prescription for both of these.      Will continue to follow and support patient as needed. Will follow up with phone call 48 hours after discharge.     I spent 60 minutes with the patient.    Nayana Gonzalez, SILVESTRE, RRT, CTTS  Chronic Pulmonary Disease Specialist  Office: [phone]   Pager: [phone]             "

## 2021-02-24 NOTE — ED PROVIDER NOTES
[place] EMERGENCY DEPARTMENT ([facility])  February 23, 2021    ED 3   8:54 PM   History     Chief Complaint   Patient presents with     Shortness of Breath     The history is provided by the patient and medical records.     Jenn Aparicio is a 65 year old female with history of COPD on home O2 1.5 liter/minute, lung cancer status post partial lobectomy who presents via EMS with shortness of breath. Patient states that she has been having shortness of breath for the past few weeks and has been progressively getting worse and worse. She was supposed to have started steroids but there were difficulties getting this started. She continued to have shortness of breath to point where she had to increase her O2 flow rate but still couldn't get her shortness of breath under control. She did 3-4 nebs today on top of her usual inhalers, no improvement with this.  911 was called.  When EMS arrived they found her sitting outside her front door with heavy work of breathing. Her O2 sats were in the mid 90s on arrival.Medics put her on BiPAP 15/5, then down to 10/5.  Her breathing seemed tight with this so they gave her and albuterol neb with her BiPAP, had improvement of work of breathing with this.  End-tidal CO2 in the low 30s to the mid 40s.  Patient was initially tachycardic to 112-115, and this improved to 90 bpm after treatments.  They placed an 18-gauge IV in the right antecubital, attempted to place a second IV in her left hand without success.  Blood sugars were 109 on arrival. Here patient endorses crackling sounds with breathing, shortness of breath that worsens with exertion over the past 2 weeks.  Denies fevers.  She states she has stopped smoking.  Does not know if her weight has changed.  She states that she has not been eating, but that has been hydrating.  She endorses a cough but feels unable to expectorate any mucus.  She feels that she can use another nebulizer right now.    PAST MEDICAL HISTORY:    Past Medical History:   Diagnosis Date     Asthma      Ectopic pregnancy      Type II diabetes mellitus (H)        PAST SURGICAL HISTORY: History reviewed. No pertinent surgical history.    Past medical history, past surgical history, medications, and allergies were reviewed with the patient. Additional pertinent items: None    FAMILY HISTORY:   Family History   Problem Relation Age of Onset     Depression Daughter      Alcohol/Drug Other         self     Diabetes Other         self     Thyroid Disease Other         self     Asthma Other         self       SOCIAL HISTORY:   Social History     Tobacco Use     Smoking status: Former Smoker     Smokeless tobacco: Former User   Substance Use Topics     Alcohol use: Yes     Comment: sometime     Social history was reviewed with the patient. Additional pertinent items: None      Patient's Medications   New Prescriptions    No medications on file   Previous Medications    ALBUTEROL (PROAIR HFA/PROVENTIL HFA/VENTOLIN HFA) 108 (90 BASE) MCG/ACT INHALER    INHALE 1 OR 2 PUFFS BY MOUTH EVERY 4 HOURS AS NEEDED    ALBUTEROL (PROVENTIL) (2.5 MG/3ML) 0.083% NEB SOLUTION    Take 1 vial (2.5 mg) by nebulization every 6 hours as needed for shortness of breath / dyspnea or wheezing    ALPHA-LIPOIC ACID 300 MG CAPS    Take 300 mg by mouth daily    AMLODIPINE (NORVASC) 10 MG TABLET    Take 1 tablet (10 mg) by mouth daily    ASPIRIN (ASPIR-81 PO)    Take  by mouth.    AZITHROMYCIN (ZITHROMAX) 250 MG TABLET    Take 2 tablets (500 mg) by mouth daily for 1 day, THEN 1 tablet (250 mg) daily for 4 days.    DIPHENOXYLATE-ATROPINE (LOMOTIL) 2.5-0.025 MG TABLET    Take 1 tablet by mouth 4 times daily as needed for diarrhea    FLUTICASONE-VILANTEROL (BREO ELLIPTA) 200-25 MCG/INH INHALER    Inhale 1 puff into the lungs daily    GLIPIZIDE (GLUCOTROL XL) 2.5 MG 24 HR TABLET    Take 1 tablet (2.5 mg) by mouth daily    IPRATROPIUM - ALBUTEROL 0.5 MG/2.5 MG/3 ML (DUONEB) 0.5-2.5 (3) MG/3ML NEB  "SOLUTION    Take 1 vial (3 mLs) by nebulization every 6 hours as needed for shortness of breath / dyspnea or wheezing    NICOTINE (NICODERM CQ) 14 MG/24HR 24 HR PATCH    Place 1 patch onto the skin every 24 hours    PREDNISONE (DELTASONE) 20 MG TABLET    Take 1 tablet (20 mg) by mouth daily    PREDNISONE (DELTASONE) 20 MG TABLET    Take 2 tablets (40 mg) by mouth daily for 5 days    SODIUM CHLORIDE (NEBUSAL) 3 % NEB SOLUTION    Take 3 mLs by nebulization daily Use 1-2 times daily in combination with Duoneb and Aerobika to help with mucus clearance    UMECLIDINIUM (INCRUSE ELLIPTA) 62.5 MCG/INH INHALER    Inhale 1 puff into the lungs daily   Modified Medications    No medications on file   Discontinued Medications    No medications on file          Allergies   Allergen Reactions     Aspirin      325mg      Colon Care      Penicillins         Review of Systems   Constitutional: Negative for fever.   Respiratory: Positive for cough, chest tightness, shortness of breath and wheezing.    All other systems reviewed and are negative.    A complete review of systems was performed with pertinent positives and negatives noted in the HPI, and all other systems negative.    Physical Exam   BP: (!) 147/82  Pulse: 95  Temp: 97.6  F (36.4  C)  Resp: 16  Height: 157.5 cm (5' 2\")  Weight: 99.2 kg (218 lb 11.1 oz)  SpO2: 95 %      Physical Exam  Vitals signs and nursing note reviewed.   Constitutional:       General: She is not in acute distress.     Appearance: She is well-developed. She is not diaphoretic.   HENT:      Head: Normocephalic and atraumatic.   Eyes:      General: No scleral icterus.     Extraocular Movements: Extraocular movements intact.      Pupils: Pupils are equal, round, and reactive to light.   Neck:      Musculoskeletal: Normal range of motion and neck supple.   Cardiovascular:      Rate and Rhythm: Normal rate.   Pulmonary:      Effort: Pulmonary effort is normal. No accessory muscle usage.      Breath sounds: " Decreased breath sounds and wheezing present.   Skin:     General: Skin is warm and dry.      Coloration: Skin is not pale.      Findings: No erythema or rash.   Neurological:      Mental Status: She is alert and oriented to person, place, and time.         ED Course        Procedures             EKG Interpretation:      Interpreted by Krunal Park MD  Time reviewed: 2033  Symptoms at time of EKG: Shortness of breath  Rhythm: normal sinus   Rate: normal  Axis: normal  Ectopy: none  Conduction: normal  ST Segments/ T Waves: No ST-T wave changes  Q Waves: none  Comparison to prior: Unchanged    Clinical Impression: normal EKG                        Results for orders placed or performed during the hospital encounter of 02/23/21 (from the past 24 hour(s))   CBC with platelets differential   Result Value Ref Range    WBC 6.5 4.0 - 11.0 10e9/L    RBC Count 4.85 3.8 - 5.2 10e12/L    Hemoglobin 13.5 11.7 - 15.7 g/dL    Hematocrit 43.3 35.0 - 47.0 %    MCV 89 78 - 100 fl    MCH 27.8 26.5 - 33.0 pg    MCHC 31.2 (L) 31.5 - 36.5 g/dL    RDW 13.1 10.0 - 15.0 %    Platelet Count 211 150 - 450 10e9/L    Diff Method Automated Method     % Neutrophils 49.3 %    % Lymphocytes 32.9 %    % Monocytes 11.3 %    % Eosinophils 5.8 %    % Basophils 0.5 %    % Immature Granulocytes 0.2 %    Nucleated RBCs 0 0 /100    Absolute Neutrophil 3.2 1.6 - 8.3 10e9/L    Absolute Lymphocytes 2.2 0.8 - 5.3 10e9/L    Absolute Monocytes 0.7 0.0 - 1.3 10e9/L    Absolute Eosinophils 0.4 0.0 - 0.7 10e9/L    Absolute Basophils 0.0 0.0 - 0.2 10e9/L    Abs Immature Granulocytes 0.0 0 - 0.4 10e9/L    Absolute Nucleated RBC 0.0    D dimer quantitative   Result Value Ref Range    D Dimer <0.3 0.0 - 0.50 ug/ml FEU   INR   Result Value Ref Range    INR 1.02 0.86 - 1.14   Comprehensive metabolic panel   Result Value Ref Range    Sodium 141 133 - 144 mmol/L    Potassium 3.5 3.4 - 5.3 mmol/L    Chloride 105 94 - 109 mmol/L    Carbon Dioxide 34 (H) 20 - 32  mmol/L    Anion Gap 2 (L) 3 - 14 mmol/L    Glucose 110 (H) 70 - 99 mg/dL    Urea Nitrogen 7 7 - 30 mg/dL    Creatinine 0.66 0.52 - 1.04 mg/dL    GFR Estimate >90 >60 mL/min/[1.73_m2]    GFR Estimate If Black >90 >60 mL/min/[1.73_m2]    Calcium 8.7 8.5 - 10.1 mg/dL    Bilirubin Total 0.1 (L) 0.2 - 1.3 mg/dL    Albumin 3.3 (L) 3.4 - 5.0 g/dL    Protein Total 7.0 6.8 - 8.8 g/dL    Alkaline Phosphatase 114 40 - 150 U/L    ALT 14 0 - 50 U/L    AST 11 0 - 45 U/L   Troponin I   Result Value Ref Range    Troponin I ES <0.015 0.000 - 0.045 ug/L   Nt probnp inpatient (BNP)   Result Value Ref Range    N-Terminal Pro BNP Inpatient 39 0 - 900 pg/mL   EKG 12-lead, tracing only   Result Value Ref Range    Interpretation ECG Click View Image link to view waveform and result    XR Chest Port 1 View    Narrative    Chest radiograph 2/23/2021 8:36 PM    HISTORY: Shortness of breath    COMPARISON: CT chest 12/15/2020    FINDINGS:  Single AP view of the chest. Trachea is midline. Cardiac silhouette is  within normal limits. No pneumothorax. Small right pleural effusion.  No significant left pleural effusion. Bilateral upper lobe predominant  emphysematous changes. Streaky bibasilar opacities.      Impression    IMPRESSION:  1. Bibasilar streaky opacities, likely atelectasis, with small right  pleural effusion.  2. Chronic emphysematous changes.    I have personally reviewed the examination and initial interpretation  and I agree with the findings.    EVAN PANDA MD   Symptomatic Influenza A/B & SARS-CoV2 (COVID-19) Virus PCR Multiplex    Specimen: Nasopharyngeal   Result Value Ref Range    Flu A/B & SARS-COV-2 PCR Source Nasopharyngeal     SARS-CoV-2 PCR Result NEGATIVE     Influenza A PCR Negative NEG^Negative    Influenza B PCR Negative NEG^Negative    Respiratory Syncytial Virus PCR Negative NEG^Negative    Flu A/B & SARS-CoV-2 PCR Comment (Note)      Medications   ipratropium - albuterol 0.5 mg/2.5 mg/3 mL (DUONEB) neb solution 3  mL (3 mLs Nebulization Given 2/23/21 2028)   methylPREDNISolone sodium succinate (solu-MEDROL) injection 81.25 mg (81.25 mg Intravenous Given 2/23/21 2027)   ipratropium - albuterol 0.5 mg/2.5 mg/3 mL (DUONEB) neb solution 3 mL (3 mLs Nebulization Given 2/23/21 2136)             Assessments & Plan (with Medical Decision Making)   This is a 65-year-old female patient presenting to the emergency room with an extensive past medical history.  Patient states for the past several weeks she has had increasing dyspnea on exertion.  She has been taking all of her medications with no significant improvement of her symptoms.  She denies any fevers or chest pain.  Today she called stating that things are not getting any better and EMS arrived.  They found her to be in distress speaking 1 word sentences.  She was placed on BiPAP and provided with an inline nebulization treatment.  Upon presentation to the emergency room she is now speaking in full sentences and in no obvious distress.  She has no accessory use and has minimal wheezing.  Chest x-ray confirms no acute intrapulmonary pathology that could correlate to the symptoms.  She is Covid negative and is otherwise resting comfortably at this time.  She is provided with 2 additional nebs here in the emergency room as well as some IV Solu-Medrol and on reassessment she is significantly improved.  At this time the patient did not feel comfortable as she is afraid that she is can have acute exacerbation again and requested admission.  At this time I will admit her to the observation unit for continued care and management.  Patient remained significantly improved throughout her entire stay here in the emergency room.    I have reviewed the nursing notes.    I have reviewed the findings, diagnosis, plan and need for follow up with the patient.    New Prescriptions    No medications on file       Final diagnoses:   Chronic obstructive pulmonary disease, unspecified COPD type (H)      I, Rachel Beasley, am serving as a trained medical scribe to document services personally performed by Krunal Park MD based on the provider's statements to me on February 23, 2021.  This document has been checked and approved by the attending provider.    I, Krunal Park MD, was physically present and have reviewed and verified the accuracy of this note documented by Rachel Beasley, medical scribe.     2/23/2021   [facility] EMERGENCY DEPARTMENT     Krunal Park MD  02/24/21 0226

## 2021-02-25 LAB
ANION GAP SERPL CALCULATED.3IONS-SCNC: 4 MMOL/L (ref 3–14)
BACTERIA SPEC CULT: ABNORMAL
BACTERIA SPEC CULT: ABNORMAL
BACTERIA SPEC CULT: NORMAL
BASOPHILS # BLD AUTO: 0 10E9/L (ref 0–0.2)
BASOPHILS NFR BLD AUTO: 0.1 %
BUN SERPL-MCNC: 11 MG/DL (ref 7–30)
CALCIUM SERPL-MCNC: 8.8 MG/DL (ref 8.5–10.1)
CHLORIDE SERPL-SCNC: 110 MMOL/L (ref 94–109)
CO2 SERPL-SCNC: 29 MMOL/L (ref 20–32)
CREAT SERPL-MCNC: 0.5 MG/DL (ref 0.52–1.04)
DIFFERENTIAL METHOD BLD: ABNORMAL
EOSINOPHIL # BLD AUTO: 0 10E9/L (ref 0–0.7)
EOSINOPHIL NFR BLD AUTO: 0 %
ERYTHROCYTE [DISTWIDTH] IN BLOOD BY AUTOMATED COUNT: 13.5 % (ref 10–15)
GFR SERPL CREATININE-BSD FRML MDRD: >90 ML/MIN/{1.73_M2}
GLUCOSE BLDC GLUCOMTR-MCNC: 139 MG/DL (ref 70–99)
GLUCOSE BLDC GLUCOMTR-MCNC: 182 MG/DL (ref 70–99)
GLUCOSE BLDC GLUCOMTR-MCNC: 198 MG/DL (ref 70–99)
GLUCOSE BLDC GLUCOMTR-MCNC: 200 MG/DL (ref 70–99)
GLUCOSE BLDC GLUCOMTR-MCNC: 217 MG/DL (ref 70–99)
GLUCOSE SERPL-MCNC: 188 MG/DL (ref 70–99)
GRAM STN SPEC: ABNORMAL
HCT VFR BLD AUTO: 38 % (ref 35–47)
HGB BLD-MCNC: 11.8 G/DL (ref 11.7–15.7)
IMM GRANULOCYTES # BLD: 0 10E9/L (ref 0–0.4)
IMM GRANULOCYTES NFR BLD: 0.4 %
LACTATE BLD-SCNC: 1.9 MMOL/L (ref 0.7–2)
LYMPHOCYTES # BLD AUTO: 1.8 10E9/L (ref 0.8–5.3)
LYMPHOCYTES NFR BLD AUTO: 17.1 %
Lab: ABNORMAL
Lab: NORMAL
MCH RBC QN AUTO: 28.2 PG (ref 26.5–33)
MCHC RBC AUTO-ENTMCNC: 31.1 G/DL (ref 31.5–36.5)
MCV RBC AUTO: 91 FL (ref 78–100)
MONOCYTES # BLD AUTO: 0.6 10E9/L (ref 0–1.3)
MONOCYTES NFR BLD AUTO: 6.1 %
NEUTROPHILS # BLD AUTO: 8 10E9/L (ref 1.6–8.3)
NEUTROPHILS NFR BLD AUTO: 76.3 %
NRBC # BLD AUTO: 0 10*3/UL
NRBC BLD AUTO-RTO: 0 /100
PHOSPHATE SERPL-MCNC: 2.5 MG/DL (ref 2.5–4.5)
PLATELET # BLD AUTO: 178 10E9/L (ref 150–450)
POTASSIUM SERPL-SCNC: 4.4 MMOL/L (ref 3.4–5.3)
RBC # BLD AUTO: 4.19 10E12/L (ref 3.8–5.2)
SODIUM SERPL-SCNC: 143 MMOL/L (ref 133–144)
SPECIMEN SOURCE: ABNORMAL
SPECIMEN SOURCE: ABNORMAL
SPECIMEN SOURCE: NORMAL
WBC # BLD AUTO: 10.5 10E9/L (ref 4–11)

## 2021-02-25 PROCEDURE — 250N000013 HC RX MED GY IP 250 OP 250 PS 637: Performed by: STUDENT IN AN ORGANIZED HEALTH CARE EDUCATION/TRAINING PROGRAM

## 2021-02-25 PROCEDURE — 94640 AIRWAY INHALATION TREATMENT: CPT | Mod: 76

## 2021-02-25 PROCEDURE — 36415 COLL VENOUS BLD VENIPUNCTURE: CPT | Performed by: STUDENT IN AN ORGANIZED HEALTH CARE EDUCATION/TRAINING PROGRAM

## 2021-02-25 PROCEDURE — 84100 ASSAY OF PHOSPHORUS: CPT | Performed by: STUDENT IN AN ORGANIZED HEALTH CARE EDUCATION/TRAINING PROGRAM

## 2021-02-25 PROCEDURE — 87205 SMEAR GRAM STAIN: CPT | Performed by: STUDENT IN AN ORGANIZED HEALTH CARE EDUCATION/TRAINING PROGRAM

## 2021-02-25 PROCEDURE — 999N000157 HC STATISTIC RCP TIME EA 10 MIN

## 2021-02-25 PROCEDURE — 250N000011 HC RX IP 250 OP 636: Performed by: STUDENT IN AN ORGANIZED HEALTH CARE EDUCATION/TRAINING PROGRAM

## 2021-02-25 PROCEDURE — 250N000012 HC RX MED GY IP 250 OP 636 PS 637: Performed by: STUDENT IN AN ORGANIZED HEALTH CARE EDUCATION/TRAINING PROGRAM

## 2021-02-25 PROCEDURE — 999N001017 HC STATISTIC GLUCOSE BY METER IP

## 2021-02-25 PROCEDURE — 83605 ASSAY OF LACTIC ACID: CPT | Performed by: STUDENT IN AN ORGANIZED HEALTH CARE EDUCATION/TRAINING PROGRAM

## 2021-02-25 PROCEDURE — 80048 BASIC METABOLIC PNL TOTAL CA: CPT | Performed by: STUDENT IN AN ORGANIZED HEALTH CARE EDUCATION/TRAINING PROGRAM

## 2021-02-25 PROCEDURE — 94640 AIRWAY INHALATION TREATMENT: CPT

## 2021-02-25 PROCEDURE — 120N000002 HC R&B MED SURG/OB UMMC

## 2021-02-25 PROCEDURE — 250N000009 HC RX 250: Performed by: STUDENT IN AN ORGANIZED HEALTH CARE EDUCATION/TRAINING PROGRAM

## 2021-02-25 PROCEDURE — 99232 SBSQ HOSP IP/OBS MODERATE 35: CPT | Mod: GC | Performed by: STUDENT IN AN ORGANIZED HEALTH CARE EDUCATION/TRAINING PROGRAM

## 2021-02-25 PROCEDURE — 85025 COMPLETE CBC W/AUTO DIFF WBC: CPT | Performed by: STUDENT IN AN ORGANIZED HEALTH CARE EDUCATION/TRAINING PROGRAM

## 2021-02-25 RX ORDER — LIDOCAINE 4 G/G
1 PATCH TOPICAL
Status: DISCONTINUED | OUTPATIENT
Start: 2021-02-25 | End: 2021-02-26 | Stop reason: HOSPADM

## 2021-02-25 RX ORDER — CHOLECALCIFEROL (VITAMIN D3) 50 MCG
TABLET ORAL DAILY
COMMUNITY
End: 2022-04-20

## 2021-02-25 RX ADMIN — IPRATROPIUM BROMIDE AND ALBUTEROL SULFATE 3 ML: .5; 3 SOLUTION RESPIRATORY (INHALATION) at 12:41

## 2021-02-25 RX ADMIN — KETOROLAC TROMETHAMINE 15 MG: 15 INJECTION, SOLUTION INTRAMUSCULAR; INTRAVENOUS at 15:49

## 2021-02-25 RX ADMIN — IPRATROPIUM BROMIDE AND ALBUTEROL SULFATE 3 ML: .5; 3 SOLUTION RESPIRATORY (INHALATION) at 19:39

## 2021-02-25 RX ADMIN — IPRATROPIUM BROMIDE AND ALBUTEROL SULFATE 3 ML: .5; 3 SOLUTION RESPIRATORY (INHALATION) at 10:04

## 2021-02-25 RX ADMIN — LIDOCAINE 1 PATCH: 560 PATCH PERCUTANEOUS; TOPICAL; TRANSDERMAL at 17:23

## 2021-02-25 RX ADMIN — KETOROLAC TROMETHAMINE 15 MG: 15 INJECTION, SOLUTION INTRAMUSCULAR; INTRAVENOUS at 09:47

## 2021-02-25 RX ADMIN — CEFEPIME HYDROCHLORIDE 2 G: 2 INJECTION, POWDER, FOR SOLUTION INTRAVENOUS at 13:54

## 2021-02-25 RX ADMIN — KETOROLAC TROMETHAMINE 15 MG: 15 INJECTION, SOLUTION INTRAMUSCULAR; INTRAVENOUS at 02:51

## 2021-02-25 RX ADMIN — POLYETHYLENE GLYCOL 3350 17 G: 17 POWDER, FOR SOLUTION ORAL at 07:56

## 2021-02-25 RX ADMIN — IPRATROPIUM BROMIDE AND ALBUTEROL SULFATE 3 ML: .5; 3 SOLUTION RESPIRATORY (INHALATION) at 23:31

## 2021-02-25 RX ADMIN — ACETAMINOPHEN 650 MG: 325 TABLET, FILM COATED ORAL at 11:53

## 2021-02-25 RX ADMIN — IPRATROPIUM BROMIDE AND ALBUTEROL SULFATE 3 ML: .5; 3 SOLUTION RESPIRATORY (INHALATION) at 01:08

## 2021-02-25 RX ADMIN — CEFEPIME HYDROCHLORIDE 2 G: 2 INJECTION, POWDER, FOR SOLUTION INTRAVENOUS at 22:09

## 2021-02-25 RX ADMIN — GLIPIZIDE 5 MG: 5 TABLET, FILM COATED, EXTENDED RELEASE ORAL at 07:54

## 2021-02-25 RX ADMIN — IPRATROPIUM BROMIDE AND ALBUTEROL SULFATE 3 ML: .5; 3 SOLUTION RESPIRATORY (INHALATION) at 15:47

## 2021-02-25 RX ADMIN — AMLODIPINE BESYLATE 10 MG: 10 TABLET ORAL at 07:54

## 2021-02-25 RX ADMIN — CEFEPIME HYDROCHLORIDE 2 G: 2 INJECTION, POWDER, FOR SOLUTION INTRAVENOUS at 06:28

## 2021-02-25 RX ADMIN — PREDNISONE 40 MG: 20 TABLET ORAL at 07:54

## 2021-02-25 RX ADMIN — ACETAMINOPHEN 650 MG: 325 TABLET, FILM COATED ORAL at 08:03

## 2021-02-25 RX ADMIN — ENOXAPARIN SODIUM 40 MG: 40 INJECTION SUBCUTANEOUS at 20:14

## 2021-02-25 ASSESSMENT — ACTIVITIES OF DAILY LIVING (ADL)
DRESSING/BATHING_MANAGEMENT: SHOWER CHAIR
ADLS_ACUITY_SCORE: 18
DOING_ERRANDS_INDEPENDENTLY_DIFFICULTY: YES
WALKING_OR_CLIMBING_STAIRS: AMBULATION DIFFICULTY, REQUIRES EQUIPMENT
ADLS_ACUITY_SCORE: 19
ADLS_ACUITY_SCORE: 19
DIFFICULTY_EATING/SWALLOWING: NO
ADLS_ACUITY_SCORE: 16
WEAR_GLASSES_OR_BLIND: YES
DRESSING/BATHING: BATHING DIFFICULTY, ASSISTANCE 1 PERSON;DRESSING DIFFICULTY, ASSISTANCE 1 PERSON
CONCENTRATING,_REMEMBERING_OR_MAKING_DECISIONS_DIFFICULTY: NO
VISION_MANAGEMENT: GLASSES
HEARING_DIFFICULTY_OR_DEAF: NO
EQUIPMENT_CURRENTLY_USED_AT_HOME: WALKER, ROLLING
DIFFICULTY_COMMUNICATING: NO
NUMBER_OF_TIMES_PATIENT_HAS_FALLEN_WITHIN_LAST_SIX_MONTHS: 3
DRESSING/BATHING_DIFFICULTY: YES
FALL_HISTORY_WITHIN_LAST_SIX_MONTHS: YES
TOILETING_ISSUES: NO
WALKING_OR_CLIMBING_STAIRS_DIFFICULTY: YES
ADLS_ACUITY_SCORE: 18
ADLS_ACUITY_SCORE: 19

## 2021-02-25 NOTE — H&P
"[facility]    Family Medicine History and Physical - [facility]       Date of Admission:  2/23/2021    Chief Complaint   Chest pain and dyspnea    History is obtained from the patient    History of Present Illness   Jenn Aparicio is a 65 year old female  who has a history of severe COPD, adenocarcinoma of the lung s/p right lower lobectomy (2016) and suspected RUL NSCLC  and is admitted for COPD exacerbation.    Jenn has had 4 weeks of progressive SOB to point that it \"was getting so bad I couldn't eat, couldn't sleep flat.\" 3 pillows to sleep. No edema. She also reports a gradual burning in her chest and throat that became progressively \"heavier.\" She cannot evaluate if the pain is from acid reflux or a cold, or something else. She used Vicks at home to help with the heavy feeling and this helped a bit initially but her chest now hurts constantly. Pain on anterior chest wall for the past week and muscle spasms for the prior month, wrapping around to sides. She also endorses recent voice changes (\"feels more scratchy\"). Voice not normal-feels scratchy.    Her outpatient pulmonologist was trying to manage this problem as an outpatient with prednisone at home and Azithromycin. She endorses that this helped for a few days however then pain and SOB returned.    O2 needs: Has required 1.5L at night for the past year+. This became a daily need in the last month and in the last week or two she has increased to 2 L but still severe SOB with any activity and at times at rest. Denies having fevers at home but has had chills.     History of PE with a pregnancy. Was on anticoagulation afterward without complication.    In the ED, /85, HR 98, temp 97.6, RR 16, SPO2 93% on 2 liters oxygen per nasal cannula. EKG shows NSR. Chest xray shows Bibasilar streaky opacities, likely atelectasis, with small right pleural effusion. Chronic emphysematous changes. Labs show Na 141, K+ " 3.5, Cr 0.66, BUN 7. Anion gap 2. Carbon dioxide 34. BNP 39. Troponin negative. CBC is unremarkable. Covid/RSV/Influenza panel is negative. She was given DuoNeb x2 in the ED and Solumedrol 81.25mg IV x1. She was admitted to observation for further management of COPD exacerbation.    Review of Systems   The 10 point Review of Systems is negative other than noted in the HPI or here.     Past Medical History    I have reviewed this patient's medical history and updated it with pertinent information if needed.   Past Medical History:   Diagnosis Date     Adenocarcinoma, lung (H)      Asthma      Ectopic pregnancy      Pulmonary emphysema (H)     Very severe FEV1<30% predicted     Type II diabetes mellitus (H)         Past Surgical History   I have reviewed this patient's surgical history and updated it with pertinent information if needed.  History reviewed. No pertinent surgical history. Right lower lobe lobectomy 2019    Social History   Social History     Tobacco Use     Smoking status: Current Every Day Smoker     Packs/day: 0.25     Types: Cigarettes     Smokeless tobacco: Former User     Tobacco comment: 2/24/21 Patient has quit as of 2/12/21. Patient will be considered former smokers if she remains smoke-free for 6 months   Substance Use Topics     Alcohol use: Yes     Comment: sometime     Drug use: No       Family History   I have reviewed this patient's family history and updated it with pertinent information if needed.   Family History   Problem Relation Age of Onset     Depression Daughter      Alcohol/Drug Other         self     Diabetes Other         self     Thyroid Disease Other         self     Asthma Other         self       Prior to Admission Medications   Prior to Admission Medications   Prescriptions Last Dose Informant Patient Reported? Taking?   Alpha-Lipoic Acid 300 MG CAPS   Yes No   Sig: Take 300 mg by mouth daily   Aspirin (ASPIR-81 PO)   Yes No   Sig: Take  by mouth.   albuterol (PROAIR  HFA/PROVENTIL HFA/VENTOLIN HFA) 108 (90 Base) MCG/ACT inhaler   No No   Sig: INHALE 1 OR 2 PUFFS BY MOUTH EVERY 4 HOURS AS NEEDED   albuterol (PROVENTIL) (2.5 MG/3ML) 0.083% neb solution   No No   Sig: Take 1 vial (2.5 mg) by nebulization every 6 hours as needed for shortness of breath / dyspnea or wheezing   amLODIPine (NORVASC) 10 MG tablet   No No   Sig: Take 1 tablet (10 mg) by mouth daily   azithromycin (ZITHROMAX) 250 MG tablet   No No   Sig: Take 2 tablets (500 mg) by mouth daily for 1 day, THEN 1 tablet (250 mg) daily for 4 days.   diphenoxylate-atropine (LOMOTIL) 2.5-0.025 MG tablet   No No   Sig: Take 1 tablet by mouth 4 times daily as needed for diarrhea   fluticasone-vilanterol (BREO ELLIPTA) 200-25 MCG/INH inhaler   No No   Sig: Inhale 1 puff into the lungs daily   glipiZIDE (GLUCOTROL XL) 2.5 MG 24 hr tablet   No No   Sig: Take 1 tablet (2.5 mg) by mouth daily   ipratropium - albuterol 0.5 mg/2.5 mg/3 mL (DUONEB) 0.5-2.5 (3) MG/3ML neb solution   No No   Sig: Take 1 vial (3 mLs) by nebulization every 6 hours as needed for shortness of breath / dyspnea or wheezing   nicotine (NICODERM CQ) 14 MG/24HR 24 hr patch   No No   Sig: Place 1 patch onto the skin every 24 hours   predniSONE (DELTASONE) 20 MG tablet   No No   Sig: Take 1 tablet (20 mg) by mouth daily   Patient not taking: Reported on 2/12/2021   predniSONE (DELTASONE) 20 MG tablet   No No   Sig: Take 2 tablets (40 mg) by mouth daily for 5 days   sodium chloride (NEBUSAL) 3 % neb solution   No No   Sig: Take 3 mLs by nebulization daily Use 1-2 times daily in combination with Duoneb and Aerobika to help with mucus clearance   umeclidinium (INCRUSE ELLIPTA) 62.5 MCG/INH inhaler   No No   Sig: Inhale 1 puff into the lungs daily      Facility-Administered Medications: None     Allergies   Allergies   Allergen Reactions     Aspirin      325mg      Colon Care      Penicillins        Physical Exam   Vital Signs: Temp: 98.6  F (37  C) Temp src: Oral BP:  134/60 Pulse: 107   Resp: 26 SpO2: 92 % O2 Device: Nasal cannula Oxygen Delivery: 2.5 LPM  Weight: 200 lbs 9.9 oz    General Appearance: Alert, pleasant and conversational, appears older than stated age, not in acute distress, nondiaphoretic, resting on cot  Eyes: nonicteric, EOMI, PERRL  HEENT: Atraumatic, moist mucous membranes   Respiratory: speaking in full sentences breathing comfortably on 2.5L by NC, no accessory muscle use; decreased breath sounds with mild wheezing throughout both lungs  Cardiovascular: tachycardic, regular rhythm, 2+ radial and DP pulses bilaterally, no LE edema, no cyanosis, brisk cap refill  GI: soft, nontender, normoactive bowel sounds  Skin: warm and dry   Neurologic: alert and oriented x3, CN II-XII grossly intact  Psychiatric: positive mood with congruent affect    Assessment & Plan   Jenn Aparicio is a 65 year old female admitted on 2/23/2021. She has a history of very severe COPD and adenocarcinoma of the lung s/p right lower lobectomy, DM, and HTN and is admitted for acute  exacerbation.    #COPD Exacerbation  #Very Severe COPD  #Chest Pain   #Acute on Chronic Respiratory Failure  Gradually worsening SOB for the past year with acute worsening in the past month. Ddx includes COPD exacerbation, PNA, PE, pleural effusion, pneumothorax, CHF, MI, and anxiety. Of these, a COPD exacerbation is most likely. Infection is unlikely given she has remained afebrile without leukocytosis,and a non-elevated procalciton level. Chest xray shows no focal areas of consolidation suggesting PNA, pneumothorax, or pleural effusion. Ddimer was negative x2 making PE extremely unlikely.EKG was unremarkable and troponin's were negative x2 making MI unlikely. While there may be some underlying anxiety worsening her symptoms, Jenn did not appear anxious on exam. Per chart review it appears her outpatient pulmonologist has been attempting to treat her symptoms as a presumed COPD exacerbation using prednisone  "and azithromycin, which Jenn reports was initially helpful but her symptoms returned. She has been on oxygen at baseline of 1.5LPM at night but the past few months this need increased to needing oxygen throughout the day. Most recently she has needed to increase the rate to 2LPM. PFTs done in December reveal very severe airflow obstruction with significant bronchodilator response. Severe diffusion defect.   Jenn also has acute chest pain Patient notes centralized chest pain this evening. Low concern for cardiac cause given negative trops, and unchanged EKG. PE unlikely with 2 negative Ddimers. Could relate to COPD exacerbation or could be MSK pain.   - Due to FEV1 of <30% patient's COPD is \"very severe\" and will therefore broaden Levaquin to cefepime  - Continuous pulse oximetry  - Telemetry  - Oxygen at 2LPM with goal sats of 88-92%  - Continue Prednisone 40mg daily (s/p methylpred. Today is 1/5 for prednisone)  - DuoNeb Q4hrs  - levalbuterol neb Q2 hrs PRN  - COPD team consult   - Toradol 15 mg q6 hrs PRN   - Tylenol 625 mg q6 hrs PRN  - Lidocaine patch  - Holding PTA Breo Ellipta and Incruse Ellipta     #Lactic Acidosis: Lactic acid drawn due to tachycardia and increased RR. Resulted at 5.3. Rapid response was called. Likely 2/2 significant albuterol use. Low suspicion for sepsis at this time but will monitor closely. Chest x-ray as above.  Blood Cultures done x 2. She was given 3L NS bolus in ED and repeat lactic levels slowly improving, most recently 3.7 at 2035.  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 Next follow-up in April, per patient.  - follow up as outpatient        #DMII:  #Hyperglycemia:   Last A1c: 6.8 on 9/8/2020. Patient now on steroids and on antibiotics so likely will have increased BG levels.   -Continue " PTA glipizide   -Glucose checks with meals and at bedtime  -Sliding scale insulin coverage ordered     #CKD Stage 3: Creatinine stable, monitor      #HTN:  Last echo 1/2019 shows EF of 60-65%. There is no left ventricular wall motion abnormality identified. Normal right ventricular size and systolic function. No significant valvular regurgitation.   -Continue PTA amlodipine      #Tobacco Abuse: Nicotine patch     # Pain Assessment:  Current Pain Score 2/24/2021   Patient currently in pain? denies   - Jenn is experiencing pain due to COPD exacerbation. Pain management was discussed and the plan was created in a collaborative fashion.  Jenn's response to the current recommendations: engaged  - Pharmacologic adjuvants: Tylenol, Toradol and lidocaine patches        Diet: Moderate Consistent CHO Diet  Fluids: PO  DVT Prophylaxis: Enoxaparin (Lovenox) SQ  Code Status: Full Code    Disposition Plan   Expected discharge: 2 - 3 days; recommended to prior living arrangement once adequate pain management/ tolerating PO medications and O2 use at baseline. Dispo: Expected Discharge Date: 02/27/21        Entered: Caron Lopez 02/24/2021, 6:54 PM   Information in the above section will display in the discharge planner report.    The patient was discussed with Dr. Chris Lopez MD, PGY-1  [facility]   Pager: [phone]  Please see sticky note for cross cover information      Data   Recent Labs   Lab 02/24/21  1544 02/24/21  0631 02/23/21 2021   WBC  --  7.8 6.5   HGB  --  13.7 13.5   MCV  --  91 89   PLT  --  196 211   INR  --   --  1.02   NA  --  138 141   POTASSIUM  --  4.1 3.5   CHLORIDE  --  102 105   CO2  --  28 34*   BUN  --  12 7   CR 0.64 0.55 0.66   ANIONGAP  --  7 2*   LIUGI  --  9.4 8.7   GLC  --  290* 110*   ALBUMIN  --   --  3.3*   PROTTOTAL  --   --  7.0   BILITOTAL  --   --  0.1*   ALKPHOS  --   --  114   ALT  --   --  14   AST  --   --  11   TROPI  <0.015  --  <0.015     No results found for this or any previous visit (from the past 24 hour(s)).

## 2021-02-25 NOTE — PLAN OF CARE
"Shift 4567-2504  /61 (BP Location: Left arm)   Pulse 91   Temp 97.8  F (36.6  C) (Oral)   Resp 18   Ht 1.575 m (5' 2\")   Wt 91 kg (200 lb 9.9 oz)   SpO2 97%   BMI 36.69 kg/m      Reason for admission: COPD exacerbation.   Activity: SBA. Pt has not ambulated nor transferred on shift due to extreme dyspnea on exertion.   Pain: 10/10. Pt received IV Toradol at 0251, although pt reports that did not help at all. She stated she just fell asleep. Pt reports pain in left lung that radiates to her back. She states it feels tender and it feels like her \"lung is bruised\". She received Toradol before and said it didn't help either.   Neuro: A&Ox4.  Cardiac: Tachycardic. Denies chest pain.   Respiratory: Pt reports and observed dyspnea on exertion.  Pt SOB. Pt on O2 2.5 L NC sat 97-98%.   GI/: WDL. Pt has savita-wick.   Extremities: WDL.   Diet: Moderate CHO.   Lines: PIV R. Infusing abx.    Labs/imaging/Consults: RT to follow.   Discharge Plan: In progress.         "

## 2021-02-25 NOTE — PLAN OF CARE
"/62 (BP Location: Left arm)   Pulse 94   Temp 98.3  F (36.8  C) (Oral)   Resp 19   Ht 1.575 m (5' 2\")   Wt 91 kg (200 lb 9.9 oz)   SpO2 94%   BMI 36.69 kg/m      Shift: 8101-5931  Reason for Admission: SOB  VS: VSS on 3L NC.   Neuros: A/Ox4, able to make needs known  GI/: No BMs this shift, voiding adequately   Nutrition: Tolerating moderate CHO diet.   Drains/Lines: PIV SL  Activity: SBA + walker  Pain/Nausea: C/o of lung pain- PRN Toradol and Tylenol given. Denies nausea  Respiratory: Sats >90% on 3L NC  Labs: BG ACHS   New this shift: Sputum culture sent but was rejected d/t saliva contamination.   Plan of care: Plan on transferring to inpatient unit when bed available. Will continue to monitor and follow POC  "

## 2021-02-25 NOTE — PROGRESS NOTES
Rapid Response Team Note    Assessment   In assessment a rapid response was called on Jenn Aparicio due to lactic acidosis. This presentation is likely due to sepsis and worsened by COPD and recurrent lung cancer.    Plan   -  We recommend primary team start broad spectrum antibiotics including Cefepime//Vanco in order to treat this patient for severe sepsis with likely pulmonary source. Dr. Dobbins of the [facility] team was at the bedside for discussion and plans to follow through on sepsis treatment.  - Blood cultures, UA and chest x-ray already evaluated earlier today  - Consider CTA chest to rule out PE  -  The [facility] primary team was able to be reached and they are in agreement with the above plan.  -  Disposition: The patient will remain on the current unit. We will continue to monitor this patient closely.  -  Reassessment and plan follow-up will be performed by the primary team      AMBREEN Watts  [facility] [place] Job Code Contact #[phone]    Hospital Course   Brief Summary of events leading to rapid response:   65 year old female with COPD exacerbation, PMH includes DM2, tobacco use, HTN and lung cancer s/p right lower lobectomy with noted new RUL malignancy pending further treatment in April. SIRS/SEPSIS trigger for tachycardia and increased RR with resultant lactate of 4.7 and this is after 2L of fluid and prior work up for sepsis, however, patient has not been placed on appropriate broad spectrum antibiotics per sepsis guidelines. We discussed at the bedside recommendations to start Cefepime, consider CT chest to rule out PE and continue with IV fluids. Patient is currently in the ER OBS unit but will be admitted to the [facility] medicine team.    Admission Diagnosis:   Chronic obstructive pulmonary disease, unspecified COPD type (H) [J44.9]     Physical Exam   Temp: 98.6  F (37  C) Temp  Min: 97.6  F (36.4  C)  Max: 98.7  F (37.1  C)  Resp: 26 Resp  Min: 16  Max: 26  SpO2: 92 % SpO2   Min: 91 %  Max: 98 %  Pulse: 107 Pulse  Min: 87  Max: 112    No data recorded  BP: 134/60 Systolic (24hrs), Av , Min:110 , Max:155   Diastolic (24hrs), Av, Min:47, Max:97     I/Os: I/O last 3 completed shifts:  In: 760 [P.O.:760]  Out: 200 [Urine:200]     Exam:   General: acutely ill appearing  Mental Status: AAOx4.  Cardiac: tachycardic, regular rhythm, no appreciable murmurs, rubs or gallops  Lungs: 95% on 2L nasal canula, diffuse rhonchorous breath sounds, occasional coughing dry  Abdomen: obese abdomen, faint bowel sounds present, soft, non-tender to palpation throughout, no rebound or guarding  Psych: alert, oriented to name, date, hospital and recent events    Significant Results and Procedures   Lactic Acid:   Recent Labs   Lab Test 21  1824 21  1349 21  1110   LACT 4.7* 4.4*  --    LACTS  --   --  5.3*     CBC:   Recent Labs   Lab Test 21  0631 21  2021 12/15/20  0759   WBC 7.8 6.5 6.3   HGB 13.7 13.5 14.7   HCT 44.5 43.3 47.2*    211 202        Sepsis Evaluation   The patient is not known to have an infection.  Jenn Aparicio meets SIRS criteria AND has a lactate >2 or other evidence of acute organ damage.  These vital signs, lab and physical exam findings are consistent with SEVERE SEPSIS.    Sepsis Time-Zero (time severe sepsis diagnosis confirmed): 1800  21 as this was the time when Lactate resulted, and the level was > 2.0     Anti-infectives (From now, onward)    Start     Dose/Rate Route Frequency Ordered Stop    21 0800  levofloxacin (LEVAQUIN) tablet 500 mg      500 mg Oral DAILY 21 0237 21 0759        Current antibiotic coverage requires additional antibiotics for pulmonary source.    3 Hour Severe Sepsis Bundle Completion:  1. Initial Lactic Acid result shown above. Repeat lactic acid is ordered 4 hours from now  2. Blood Cultures before Antibiotics: Yes  3. Broad Spectrum Antibiotics Administered: being ordered by primary team  now  4. Fluids: 2L  mL fluids ALREADY ADMINISTERED within the 6 hours prior to sepsis time-zero

## 2021-02-25 NOTE — PHARMACY-ADMISSION MEDICATION HISTORY
Admission medication history interview status for the 2/23/2021 admission is complete. See Epic admission navigator for allergy information, pharmacy, prior to admission medications and immunization status.     Medication history interview sources:  Patient, EMR    Changes made to PTA medication list (reason)  Added: Vitamin D  Deleted: Azithromycin (completed), Lomotil (no longer takes), prednisone (completed)  Changed: None    Additional medication history information (including reliability of information, actions taken by pharmacist):  - Patient was reliable historian  - Has tolerated baby aspirin daily despite being listed as an allergy   - Has prescriptions filled through mail order with [facility]  - Patient has run out of her nebulizer solutions and also needs a new nebulizer machine as her machine no longer works well      Prior to Admission medications    Medication Sig Last Dose Taking? Auth Provider   albuterol (PROAIR HFA/PROVENTIL HFA/VENTOLIN HFA) 108 (90 Base) MCG/ACT inhaler INHALE 1 OR 2 PUFFS BY MOUTH EVERY 4 HOURS AS NEEDED  Yes Lynda Spann MD   Alpha-Lipoic Acid 300 MG CAPS Take 300 mg by mouth daily  Yes Zarina Leung MD   amLODIPine (NORVASC) 10 MG tablet Take 1 tablet (10 mg) by mouth daily  Yes Mitzi Nettles APRN CNP   Aspirin (ASPIR-81 PO) Take  by mouth.  Yes Reported, Patient   fluticasone-vilanterol (BREO ELLIPTA) 200-25 MCG/INH inhaler Inhale 1 puff into the lungs daily  Yes Lynda Spann MD   glipiZIDE (GLUCOTROL XL) 2.5 MG 24 hr tablet Take 1 tablet (2.5 mg) by mouth daily  Yes Mitzi Nettles APRN CNP   vitamin D3 (CHOLECALCIFEROL) 50 mcg (2000 units) tablet Take by mouth daily Patient is unsure of dose but takes one tablet daily and things it is 50 mcg  Yes Unknown, Entered By History   albuterol (PROVENTIL) (2.5 MG/3ML) 0.083% neb solution Take 1 vial (2.5 mg) by nebulization every 6 hours as needed for shortness of breath / dyspnea or  wheezing   Mitzi Nettles APRN CNP   ipratropium - albuterol 0.5 mg/2.5 mg/3 mL (DUONEB) 0.5-2.5 (3) MG/3ML neb solution Take 1 vial (3 mLs) by nebulization every 6 hours as needed for shortness of breath / dyspnea or wheezing   Lynda Spann MD   nicotine (NICODERM CQ) 14 MG/24HR 24 hr patch Place 1 patch onto the skin every 24 hours   Lynda Spann MD   sodium chloride (NEBUSAL) 3 % neb solution Take 3 mLs by nebulization daily Use 1-2 times daily in combination with Duoneb and Aerobika to help with mucus clearance   Lynda Spann MD   umeclidinium (INCRUSE ELLIPTA) 62.5 MCG/INH inhaler Inhale 1 puff into the lungs daily   Mitzi Nettles APRN CNP         Medication history completed by: Otilia Fitzgerald, GamalielD

## 2021-02-25 NOTE — PROVIDER NOTIFICATION
02/24/21 2000   Call Information   Date of Call 02/24/21   Time of Call 1839   Name of person requesting the team Marilu    Title of person requesting team RN   RRT Arrival time 1845   Time RRT ended 1900   Reason for call   Type of RRT Adult   Primary reason for call Sepsis suspected   Sepsis Suspected Elevated Lactate level   Was patient transferred from the ED, ICU, or PACU within last 24 hours prior to RRT call? Yes   SBAR   Situation lactic acid 4.4 after 2 liters fluids   Background COPD exacerbation   Notable History/Conditions Cancer;COPD   Assessment c/o new chest discomfort/burning. can't do any activity without getting very dyspneic. RR 30, remains on 2 liters.    Interventions IV fluids;Labs  (primary team will check procalcitonin, possible CT PE, )   Patient Outcome   Patient Outcome Transferred to  (inpatient bed when available. )   RRT Team   Attending/Primary/Covering Physician christine   Date Attending Physician notified 02/24/21   Time Attending Physician notified 2042   Physician(s) Liang CROOK   Lead RN Jackie DAVIES   Post RRT Intervention Assessment   Post RRT Assessment Stable/Improved   Date Follow Up Done 02/24/21   Time Follow Up Done 2117   Comments Waiting for inpatient bed and lab results

## 2021-02-25 NOTE — PLAN OF CARE
"Shift: 15:00 23:30  VS: /60 (BP Location: Right arm)   Pulse 107   Temp 98.6  F (37  C) (Oral)   Resp 26   Ht 1.575 m (5' 2\")   Wt 91 kg (200 lb 9.9 oz)   SpO2 93%   BMI 36.69 kg/m    Pain: Patient c/o pain in her chest/abd below her xyphoid process a 10/10. Toradol given as prescribed.  Neuro: Patient AOx4, neuros intact.  Cardiac: Sinus tachycardia.  Respiratory: Patient dyspneic on exertion. Patient unable to stand at bedside without feeling out of breathe.  GI/Diet/Appetite:  Moderate carb diet. Patient eating 100% of meals. Patient has no issues urinating - purewick put in place due to dyspnea.  : WDL  LDA's: IV in right AC infusing 125ml/hr NaCl  Skin: CDI, no new changes.  Activity: Assist x 1 with walker due to dyspnea.    Pertinent Labs/Lab Collection:     Sepsis protocol triggered at 11am 2/24/21. Lactic - 5.7, hr ~ 110, increased RR - 30. Her lactic decreased to 4.4 during the after noon and her lactic increased to 4.7. Second rapid response was called. Lactic now 3.7. Continue to monitor.         "

## 2021-02-25 NOTE — PROGRESS NOTES
[facility]    Family Medicine Progress Note - [place]'s Service       Main Plans for Today   -wean off O2 as able  -continue prednisone & duonebs  -continue cefepime  -PT/OT    Assessment & Plan   Jenn Aparicio is a 65 year old female admitted on 2/23/2021. She has a history of very severe COPD and adenocarcinoma of the lung s/p right lower lobectomy, DM, and HTN and is admitted for acute COPD exacerbation.     # COPD Exacerbation  # Very Severe COPD (FEV1 <30%)  # Acute on Chronic Respiratory Failure (baseline 1.5L at night)  Gradually worsening SOB for the past year with acute worsening in the past month. Requiring higher than baseline O2 requirements, including new daytime O2 needs. Infection is unlikely given she is afebrile without leukocytosis& normal procalciton. CXR negative for infection. D-dimer negative x 2-PE unlikely. EKG was unremarkable, troponin negative x 2-unlikely cardiac etiology. Signs/symptoms consistent with COPD exacerbation that has failed outpatient management.   - COPD team consulted-awaiting recs  - continue cefepime due to very severe COPD (switch to levaquin likely tmrw)  - continue prednisone 40mg daily (day 3 of total steroids)   - will likely need prolonged steroid course given severity of COPD  - duoNebs Q4hrs  - levalbuterol neb Q2 hrs PRN  - continuous pulse oximetry  - telemetry  - wean O2 as able, goal sats of 88-92%  - Holding PTA Breo Ellipta and Incruse Ellipta  - PT/OT     # Lactic Acidosis-resolved  Lactic acid elevated at 5.3, rapid response called. Likely 2/2 significant albuterol use & dehydration. S/P 3L NS bolus-lactic acidosis now resolved.  Low suspicion for sepsis at this time but will monitor closely.   - Abx as above     # Chest Pain   With negative d-dimer, EKG, troponin-cardiac etiology or PE unlikely. Suspect MSK pain related to COPD exacerbation.  - Toradol 15 mg q6 hrs PRN   - Tylenol 625 mg q6 hrs PRN  - Lidocaine  patch    # DM2  # Hyperglycemia  Last A1c 6.8% on 9/8/2020. Patient now on steroids-anticipate increased glucose levels.  -PTA glipizide   -Glucose checks with meals and at bedtime  -MSSI       Chronic:  # CKD Stage 3:  No acute concerns. GFR and creatinine are normal.     # HTN:   BP normotensive & stable.  -PTA amlodipine      # Tobacco Abuse:   -Nicotine patch     # Lung adenocarcinoma  IA2 [id number] poorly differentiated adenocarcinoma of RLL, s/p resection, then [id number] IA2 suspected NSCLC RUL, medically inoperable. Follows with Oncology.  - follow up as outpatient       # Pain Assessment:  Current Pain Score 2/25/2021   Patient currently in pain? yes   - Jenn is experiencing pain due to COPD exacerbation. Pain management was discussed and the plan was created in a collaborative fashion.  Jenn's response to the current recommendations: engaged  - Please see the plan for pain management as documented above    Diet: Moderate Consistent CHO Diet  Fluids: PO  DVT Prophylaxis: Enoxaparin (Lovenox) SQ  Code Status: Full Code    Disposition Plan   Expected discharge: 1-2 days, recommended to prior living arrangement once antibiotic plan established and O2 use at or close to baseline. Dispo: Expected Discharge Date: 02/27/21        Entered: Darius Aragon 02/25/2021, 10:53 AM   Information in the above section will display in the discharge planner report.      The patient's care was discussed with the Attending Physician, Dr. Malin.    Darius Aragon DO  [facility]'s Family Medicine  Pager: [phone]  Please see sticky note for cross cover information    Interval History   No events overnight. Pt feels her chest pain and breathing have improved with the antibiotic. Eating well. Still gets SOB with minimal ambulation. She endorses her living situation is not ideal-heavy pollution & dust, has air filters but no air filter machine, and has difficulty with ambulation in her apartment  as her walker is too large. Also struggles to move her O2 machine as it's heavy. Denies fever, headache, dizziness, urinary symptoms, diarrhea, abdominal pain.     Physical Exam   Vital Signs: Temp: 97.8  F (36.6  C) Temp src: Oral BP: 127/61 Pulse: 91   Resp: 18 SpO2: 96 % O2 Device: Nasal cannula Oxygen Delivery: 1 LPM  Weight: 200 lbs 9.9 oz    General Appearance: Pleasant, sitting upright in bed.   Respiratory: No respiratory distress. No tachypnea or accessory lung movement. Lungs with diffuse inspiratory & expiratory wheezing as well as crackles at the bases. Mildly decreased air movement.   Cardiovascular: RRR, no murmurs  GI: abdomen soft, non-distended, non-tender  Skin: no rashes, bruising seen on exposed skin    Data   Recent Labs   Lab 02/25/21  0620 02/24/21  1544 02/24/21  0631 02/23/21  2021   WBC 10.5  --  7.8 6.5   HGB 11.8  --  13.7 13.5   MCV 91  --  91 89     --  196 211   INR  --   --   --  1.02     --  138 141   POTASSIUM 4.4  --  4.1 3.5   CHLORIDE 110*  --  102 105   CO2 29  --  28 34*   BUN 11  --  12 7   CR 0.50* 0.64 0.55 0.66   ANIONGAP 4  --  7 2*   LUIGI 8.8  --  9.4 8.7   *  --  290* 110*   ALBUMIN  --   --   --  3.3*   PROTTOTAL  --   --   --  7.0   BILITOTAL  --   --   --  0.1*   ALKPHOS  --   --   --  114   ALT  --   --   --  14   AST  --   --   --  11   TROPI  --  <0.015  --  <0.015     No results found for this or any previous visit (from the past 24 hour(s)).  Medications       amLODIPine  10 mg Oral Daily     ceFEPIme (MAXIPIME) IV  2 g Intravenous Q8H     enoxaparin ANTICOAGULANT  40 mg Subcutaneous Q24H     [Held by provider] fluticasone-vilanterol  1 puff Inhalation Daily     glipiZIDE  5 mg Oral Daily     insulin aspart  1-7 Units Subcutaneous TID AC     insulin aspart  1-5 Units Subcutaneous At Bedtime     ipratropium - albuterol 0.5 mg/2.5 mg/3 mL  3 mL Nebulization Q4H     lidocaine   Transdermal Q8H     nicotine  1 patch Transdermal Q24H     nicotine    Transdermal Q8H     polyethylene glycol  17 g Oral Daily     predniSONE  40 mg Oral Daily     sodium chloride (PF)  3 mL Intracatheter Q8H

## 2021-02-26 ENCOUNTER — APPOINTMENT (OUTPATIENT)
Dept: OCCUPATIONAL THERAPY | Facility: CLINIC | Age: 66
DRG: 190 | End: 2021-02-26
Payer: COMMERCIAL

## 2021-02-26 VITALS
HEART RATE: 104 BPM | WEIGHT: 206.79 LBS | DIASTOLIC BLOOD PRESSURE: 57 MMHG | SYSTOLIC BLOOD PRESSURE: 111 MMHG | HEIGHT: 62 IN | RESPIRATION RATE: 20 BRPM | BODY MASS INDEX: 38.05 KG/M2 | TEMPERATURE: 97.8 F | OXYGEN SATURATION: 91 %

## 2021-02-26 LAB
ANION GAP SERPL CALCULATED.3IONS-SCNC: 5 MMOL/L (ref 3–14)
BASOPHILS # BLD AUTO: 0 10E9/L (ref 0–0.2)
BASOPHILS NFR BLD AUTO: 0.2 %
BUN SERPL-MCNC: 9 MG/DL (ref 7–30)
CALCIUM SERPL-MCNC: 8.8 MG/DL (ref 8.5–10.1)
CHLORIDE SERPL-SCNC: 106 MMOL/L (ref 94–109)
CO2 SERPL-SCNC: 30 MMOL/L (ref 20–32)
CREAT SERPL-MCNC: 0.5 MG/DL (ref 0.52–1.04)
DIFFERENTIAL METHOD BLD: ABNORMAL
EOSINOPHIL # BLD AUTO: 0 10E9/L (ref 0–0.7)
EOSINOPHIL NFR BLD AUTO: 0.1 %
ERYTHROCYTE [DISTWIDTH] IN BLOOD BY AUTOMATED COUNT: 13.8 % (ref 10–15)
GFR SERPL CREATININE-BSD FRML MDRD: >90 ML/MIN/{1.73_M2}
GLUCOSE BLDC GLUCOMTR-MCNC: 195 MG/DL (ref 70–99)
GLUCOSE BLDC GLUCOMTR-MCNC: 89 MG/DL (ref 70–99)
GLUCOSE BLDC GLUCOMTR-MCNC: 97 MG/DL (ref 70–99)
GLUCOSE SERPL-MCNC: 102 MG/DL (ref 70–99)
HCT VFR BLD AUTO: 38.6 % (ref 35–47)
HGB BLD-MCNC: 12.1 G/DL (ref 11.7–15.7)
IMM GRANULOCYTES # BLD: 0.1 10E9/L (ref 0–0.4)
IMM GRANULOCYTES NFR BLD: 0.8 %
INTERPRETATION ECG - MUSE: NORMAL
LYMPHOCYTES # BLD AUTO: 2.9 10E9/L (ref 0.8–5.3)
LYMPHOCYTES NFR BLD AUTO: 31.3 %
MCH RBC QN AUTO: 27.7 PG (ref 26.5–33)
MCHC RBC AUTO-ENTMCNC: 31.3 G/DL (ref 31.5–36.5)
MCV RBC AUTO: 88 FL (ref 78–100)
MONOCYTES # BLD AUTO: 0.6 10E9/L (ref 0–1.3)
MONOCYTES NFR BLD AUTO: 6 %
NEUTROPHILS # BLD AUTO: 5.7 10E9/L (ref 1.6–8.3)
NEUTROPHILS NFR BLD AUTO: 61.6 %
NRBC # BLD AUTO: 0 10*3/UL
NRBC BLD AUTO-RTO: 0 /100
PLATELET # BLD AUTO: 191 10E9/L (ref 150–450)
POTASSIUM SERPL-SCNC: 3.3 MMOL/L (ref 3.4–5.3)
POTASSIUM SERPL-SCNC: 4.3 MMOL/L (ref 3.4–5.3)
RBC # BLD AUTO: 4.37 10E12/L (ref 3.8–5.2)
SODIUM SERPL-SCNC: 141 MMOL/L (ref 133–144)
WBC # BLD AUTO: 9.3 10E9/L (ref 4–11)

## 2021-02-26 PROCEDURE — 97165 OT EVAL LOW COMPLEX 30 MIN: CPT | Mod: GO | Performed by: OCCUPATIONAL THERAPIST

## 2021-02-26 PROCEDURE — 250N000013 HC RX MED GY IP 250 OP 250 PS 637: Performed by: STUDENT IN AN ORGANIZED HEALTH CARE EDUCATION/TRAINING PROGRAM

## 2021-02-26 PROCEDURE — 36415 COLL VENOUS BLD VENIPUNCTURE: CPT | Performed by: STUDENT IN AN ORGANIZED HEALTH CARE EDUCATION/TRAINING PROGRAM

## 2021-02-26 PROCEDURE — 97535 SELF CARE MNGMENT TRAINING: CPT | Mod: GO | Performed by: OCCUPATIONAL THERAPIST

## 2021-02-26 PROCEDURE — 94640 AIRWAY INHALATION TREATMENT: CPT | Mod: 76

## 2021-02-26 PROCEDURE — 80048 BASIC METABOLIC PNL TOTAL CA: CPT | Performed by: STUDENT IN AN ORGANIZED HEALTH CARE EDUCATION/TRAINING PROGRAM

## 2021-02-26 PROCEDURE — 250N000009 HC RX 250: Performed by: STUDENT IN AN ORGANIZED HEALTH CARE EDUCATION/TRAINING PROGRAM

## 2021-02-26 PROCEDURE — 85025 COMPLETE CBC W/AUTO DIFF WBC: CPT | Performed by: STUDENT IN AN ORGANIZED HEALTH CARE EDUCATION/TRAINING PROGRAM

## 2021-02-26 PROCEDURE — 250N000011 HC RX IP 250 OP 636: Performed by: STUDENT IN AN ORGANIZED HEALTH CARE EDUCATION/TRAINING PROGRAM

## 2021-02-26 PROCEDURE — 97530 THERAPEUTIC ACTIVITIES: CPT | Mod: GO | Performed by: OCCUPATIONAL THERAPIST

## 2021-02-26 PROCEDURE — 999N001017 HC STATISTIC GLUCOSE BY METER IP

## 2021-02-26 PROCEDURE — 99239 HOSP IP/OBS DSCHRG MGMT >30: CPT | Mod: GC | Performed by: STUDENT IN AN ORGANIZED HEALTH CARE EDUCATION/TRAINING PROGRAM

## 2021-02-26 PROCEDURE — 84132 ASSAY OF SERUM POTASSIUM: CPT | Performed by: STUDENT IN AN ORGANIZED HEALTH CARE EDUCATION/TRAINING PROGRAM

## 2021-02-26 PROCEDURE — 94640 AIRWAY INHALATION TREATMENT: CPT

## 2021-02-26 PROCEDURE — 999N000157 HC STATISTIC RCP TIME EA 10 MIN

## 2021-02-26 PROCEDURE — 250N000012 HC RX MED GY IP 250 OP 636 PS 637: Performed by: STUDENT IN AN ORGANIZED HEALTH CARE EDUCATION/TRAINING PROGRAM

## 2021-02-26 RX ORDER — IPRATROPIUM BROMIDE AND ALBUTEROL SULFATE 2.5; .5 MG/3ML; MG/3ML
1 SOLUTION RESPIRATORY (INHALATION) EVERY 6 HOURS PRN
Qty: 1080 ML | Refills: 0 | Status: SHIPPED | OUTPATIENT
Start: 2021-02-26 | End: 2022-04-20

## 2021-02-26 RX ORDER — POTASSIUM CHLORIDE 750 MG/1
40 TABLET, EXTENDED RELEASE ORAL ONCE
Status: COMPLETED | OUTPATIENT
Start: 2021-02-26 | End: 2021-02-26

## 2021-02-26 RX ORDER — ALBUTEROL SULFATE 0.83 MG/ML
2.5 SOLUTION RESPIRATORY (INHALATION)
Status: DISCONTINUED | OUTPATIENT
Start: 2021-02-26 | End: 2021-02-26 | Stop reason: HOSPADM

## 2021-02-26 RX ORDER — LIDOCAINE 4 G/G
1 PATCH TOPICAL EVERY 24 HOURS
Qty: 7 PATCH | Refills: 0 | Status: SHIPPED | OUTPATIENT
Start: 2021-02-26 | End: 2021-03-02

## 2021-02-26 RX ORDER — PREDNISONE 5 MG/1
TABLET ORAL
Qty: 29 TABLET | Refills: 0 | Status: SHIPPED | OUTPATIENT
Start: 2021-02-27 | End: 2021-03-05

## 2021-02-26 RX ORDER — LEVOFLOXACIN 750 MG/1
750 TABLET, FILM COATED ORAL DAILY
Status: DISCONTINUED | OUTPATIENT
Start: 2021-02-26 | End: 2021-02-26 | Stop reason: HOSPADM

## 2021-02-26 RX ORDER — LEVOFLOXACIN 750 MG/1
750 TABLET, FILM COATED ORAL DAILY
Qty: 2 TABLET | Refills: 0 | Status: SHIPPED | OUTPATIENT
Start: 2021-02-27 | End: 2021-03-01

## 2021-02-26 RX ADMIN — POTASSIUM CHLORIDE 40 MEQ: 750 TABLET, EXTENDED RELEASE ORAL at 06:11

## 2021-02-26 RX ADMIN — AMLODIPINE BESYLATE 10 MG: 10 TABLET ORAL at 07:39

## 2021-02-26 RX ADMIN — LEVOFLOXACIN 750 MG: 750 TABLET, FILM COATED ORAL at 13:44

## 2021-02-26 RX ADMIN — IPRATROPIUM BROMIDE AND ALBUTEROL SULFATE 3 ML: .5; 3 SOLUTION RESPIRATORY (INHALATION) at 09:25

## 2021-02-26 RX ADMIN — IPRATROPIUM BROMIDE AND ALBUTEROL SULFATE 3 ML: .5; 3 SOLUTION RESPIRATORY (INHALATION) at 03:35

## 2021-02-26 RX ADMIN — GLIPIZIDE 5 MG: 5 TABLET, FILM COATED, EXTENDED RELEASE ORAL at 07:39

## 2021-02-26 RX ADMIN — CEFEPIME HYDROCHLORIDE 2 G: 2 INJECTION, POWDER, FOR SOLUTION INTRAVENOUS at 06:12

## 2021-02-26 RX ADMIN — POTASSIUM CHLORIDE 40 MEQ: 750 TABLET, EXTENDED RELEASE ORAL at 08:18

## 2021-02-26 RX ADMIN — POLYETHYLENE GLYCOL 3350 17 G: 17 POWDER, FOR SOLUTION ORAL at 07:39

## 2021-02-26 RX ADMIN — PREDNISONE 40 MG: 20 TABLET ORAL at 07:39

## 2021-02-26 ASSESSMENT — ENCOUNTER SYMPTOMS
DIZZINESS: 0
FATIGUE: 1
HEADACHES: 0
DIARRHEA: 0
COUGH: 1
CHILLS: 0
WHEEZING: 1
ABDOMINAL PAIN: 0
RHINORRHEA: 0
CONSTIPATION: 0
FEVER: 0
DIAPHORESIS: 0
SHORTNESS OF BREATH: 1
PALPITATIONS: 0
VOMITING: 0
CHEST TIGHTNESS: 1
DIFFICULTY URINATING: 0
CONFUSION: 0
NAUSEA: 0

## 2021-02-26 ASSESSMENT — ACTIVITIES OF DAILY LIVING (ADL)
ADLS_ACUITY_SCORE: 19
PREVIOUS_RESPONSIBILITIES: MEAL PREP;HOUSEKEEPING;LAUNDRY;SHOPPING;MEDICATION MANAGEMENT;FINANCES
ADLS_ACUITY_SCORE: 19

## 2021-02-26 ASSESSMENT — MIFFLIN-ST. JEOR: SCORE: 1436.25

## 2021-02-26 NOTE — PLAN OF CARE
"Shift: 15:00-23:30  VS: BP (!) 148/65 (BP Location: Left arm)   Pulse 105   Temp 98.1  F (36.7  C) (Oral)   Resp 18   Ht 1.575 m (5' 2\")   Wt 91 kg (200 lb 9.9 oz)   SpO2 94%   BMI 36.69 kg/m    Pain: Patient c/o of pain in anterior right chest 8/10. Tylenol and Toradol given as prescribed. Pt states \"it helps a little\".  Neuro: Patient AOx4, neuros intact, and able to make needs known.  Cardiac: Sinus tachycardiac after neb treatment and with movement. Normal sinus otherwise.  Respiratory: RR ~ 24.  GI/Diet/Appetite:  Patient is voiding. Purewick in place so patient doesn't need to get up.  : No BMs this shift.  LDA's: PIV in AC.  Skin: CDI, no new changes. Patient had a bed bath tonight.  Activity: Bedrest.     Plan: Transfer patient when bed available. Will continue to monitor.    "

## 2021-02-26 NOTE — PROGRESS NOTES
Patient has been assessed for Home Oxygen needs. Oxygen readings:    *Pulse oximetry (SpO2) = 91% on room air at rest while awake.    *SpO2 = 86%-88%% on room air during activity/with exercise.    *SpO2 improved to 91% on 2 liters/minute during activity/with exercise.    Pt made it down the lyman before needing to sit and take a break. Pt complained  Of light headedness, chest tightness, and palpitations. Pt sat down and was placed on 2L via nasal canula. SPO2 improved with oxygen administration and rest. Afterwards pt stated that condition has improved.

## 2021-02-26 NOTE — PROGRESS NOTES
[facility]  Spoke with patient to discuss plans for HC. Patient to be discharged home today and has agreed to have [facility] follow with PT and OT (declining RN). Patient care support center processing referral. Patient verbalized understanding that initial visit is scheduled for Mon 3/1. Patient has 24 hour phone number for [facility] for any questions or concerns.    Courtney Andrade RN   [facility] Care Liaison   [phone]

## 2021-02-26 NOTE — PLAN OF CARE
"Shift 9737-2533  Reason for Admission: SOB, COPD exacerbation  VS: VSS on RA .Room Air at rest O2 sat's above 90% requires O2 needs with ambulation.  Neuros: A/Ox4, able to make needs known  GI/: No BMs this shift, voiding via purewick  Nutrition: Tolerating moderate CHO diet.   Drains/Lines: PIV  Activity: SBA + walker  Pain/Nausea: denies  Respiratory: Sats >90% on RA-1L NC. RUSSELL  Labs: K replaced this AM, recheck 4.3. BG ACHS.    Discharged order in place, ride set up by . [facility] w/c ride set up for 1630. Pt discharging home with O2 supplementation.     /57 (BP Location: Left arm)   Pulse 104   Temp 97.8  F (36.6  C) (Oral)   Resp 20   Ht 1.575 m (5' 2\")   Wt 93.8 kg (206 lb 12.7 oz)   SpO2 91%   BMI 37.82 kg/m          "

## 2021-02-26 NOTE — PROGRESS NOTES
Discharge instructions reviewed with pt. Pt verbalized understanding. PIV was removed, belongings were gathered, and pt got dressed. Prescriptions were picked up at discharge pharmacy and given to pt. Pt was assisted into wheelchair and discharged home via [facility] transportation with oxygen supplementation.

## 2021-02-26 NOTE — PROGRESS NOTES
Care Management Follow Up    Length of Stay (days): 2  Expected Discharge Date: 02/27?  Concerns to be Addressed: Discharge planning, medical readiness.  Patient plan of care discussed at interdisciplinary rounds: Yes    Anticipated Discharge Disposition:  Home  Anticipated Discharge Services:  Home care   Anticipated Discharge DME:  Home oxygen, home nebulizer.     Patient/family educated on Medicare website which has current facility and service quality ratings:    Education Provided on the Discharge Plan:  yes  Patient/Family in Agreement with the Plan:  yes    Referrals Placed by CM/SW:  Home care, DME  Private pay costs discussed: Not applicable    Additional Information:  Patient evaluated by OT, recommendations for home w/HH PT/OT. Writer discussed home care options w/patient, choice offered, prefers referral to [facility] for PT/OT, declines nursing at this time. Writer discussed that at time of discharge, a [facility] w/c ride with portable O2 would be more appropriate d/t impaired activity tolerance, patient agreeable. Patient will need repeat walk test prior to discharge, will need to update [facility] w/home oxygen order, patient would benefit from portable concentrator, as she is unable to change the regulator due to hand weakness. [facility] will need to be updated on day of discharge to deliver can RNCC will continue to follow for discharge planning.     [facility] (PT/OT)  Phone  [phone]  Fax  [phone]    DME:   [facility] (new nebulizer)  Phone: [phone]     [facility] (PTA Home O2)  Phone: [phone]  Fax: [phone]     Discharge transportation:  [facility] w/c ride w/portable O2: [phone] 1345 Addendum:  Home O2 walk test completed, patient requires 2L w/activity. Provider paged at this time to place updated home O2 order. Patient aware of discharge plan. Wheelchair ride arranged through [facility], will bring oxygen. Ride scheduled for 4:30 today. Bedside nurse updated. Call out to [facility], their current order is 2L w/activity. Writer discussed patient's difficulty w/the regulator on her current machine at home. Per [facility], the updated home O2 order needs to note portable oxygen concentrator requested.  respiratory notes that they have been working with patient recently for a better home set up. Provider will update oxygen order and writer will fax to  Respiratory. Per discussion w/[facility], they will delivery a smaller concentrator that does not have a regulator but is slightly heavier than the tanks patient has now. Patient updated, disappointed but understanding and hopeful that the new set up will work better for her. Updated that the concentrator will likely not occur until tomorrow AM, she will receive a call from  Respiratory. Patient has a wall concentrator in her home that she can use and feel comfortable with the discharge plan. [facility] updated on plan, will deliver nebulizer.     5076 Addendum:   Updated oxygen order w/requested portable oxygen concentrator faxed to  respiratory at this time. Attempted to contact patient to complete IMM, line busy, will f/u.     1528 Addendum:  Due to notice late in the day, [facility] will deliver nebulizer and supplies to the patient in the hospital within the next 30-45 minutes, patient updated. IMM completed, patient agreeable to discharge.     Stacie Bermeo, RNCC, BSN    [facility]    Medicine Group  [address] [place] [id number]    [email or web address]    Office: [phone] Pager: [phone]  To contact the weekend RNCC, page [phone].

## 2021-02-26 NOTE — PLAN OF CARE
Shift: [id number]  Reason for Admission: SOB  VS: VSS on RA-1L NC.   Neuros: A/Ox4, able to make needs known  GI/: No BMs this shift, voiding via purewick  Nutrition: Tolerating moderate CHO diet.   Drains/Lines: PIV  Activity: SBA + walker, not OOB overnight  Pain/Nausea: denies  Respiratory: Sats >90% on RA-1L NC. RUSSELL  Labs: K replaced this AM, recheck at 10am. BG ACHS.  New this shift: None  Plan of care: Plan on transferring to inpatient unit when bed available. Will continue to monitor and follow POC

## 2021-02-26 NOTE — PROGRESS NOTES
02/26/21 0900   Quick Adds   Type of Visit Initial Occupational Therapy Evaluation   Living Environment   People in home alone   Current Living Arrangements apartment  (senior living apt. )   Home Accessibility no concerns   Transportation Anticipated family or friend will provide;health plan transportation   Living Environment Comments pt reports feeling like there is mold in her building, she uses an air purifier, however, it has broken.    Self-Care   Usual Activity Tolerance moderate   Current Activity Tolerance fair   Regular Exercise Yes   Activity/Exercise Type walking   Equipment Currently Used at Home grab bar, toilet;grab bar, tub/shower;shower chair;walker, rolling  (reacher. )   Activity/Exercise/Self-Care Comment pt now unable to walk down [place] to apt without needing sitting rest break.    Disability/Function   Hearing Difficulty or Deaf no   Wear Glasses or Blind yes   Vision Management glasses   Walking or Climbing Stairs Difficulty yes   Walking or Climbing Stairs ambulation difficulty, requires equipment   Dressing/Bathing Difficulty yes   Dressing/Bathing bathing difficulty, requires equipment   Change in Functional Status Since Onset of Current Illness/Injury yes   General Information   Referring Physician Samy Velazco   Patient/Family Therapy Goal Statement (OT) return to PLOF and build activity tolerance.    Additional Occupational Profile Info/Pertinent History of Current Problem 65 year old female with COPD exacerbation, PMH includes DM2, tobacco use, HTN and lung cancer s/p right lower lobectomy with noted new RUL malignancy pending further treatment in April. SIRS/SEPSIS trigger for tachycardia and increased RR with resultant lactate of 5.3. She is in no acute distress and hemodynamically stable.   Existing Precautions/Restrictions no known precautions/restrictions   General Observations and Info pt motivated in therapy.    Cognitive Status Examination   Orientation Status orientation to  person, place and time   Affect/Mental Status (Cognitive) WNL   Follows Commands WFL   Cognitive Status Comments no cognitive concerns.    Visual Perception   Visual Impairment/Limitations WFL   Sensory   Sensory Quick Adds No deficits were identified   Range of Motion Comprehensive   General Range of Motion no range of motion deficits identified   Strength Comprehensive (MMT)   General Manual Muscle Testing (MMT) Assessment other (see comments)   Comment, General Manual Muscle Testing (MMT) Assessment B UE grossly deconditioned howveer WFL for basic ADL.    Coordination   Upper Extremity Coordination No deficits were identified   Bed Mobility   Bed Mobility supine-sit;sit-supine   Supine-Sit Schaller (Bed Mobility) independent   Sit-Supine Schaller (Bed Mobility) independent   Transfers   Transfers bed-chair transfer;sit-stand transfer   Transfer Skill: Bed to Chair/Chair to Bed   Bed-Chair Schaller (Transfers) independent   Sit-Stand Transfer   Sit-Stand Schaller (Transfers) independent   Balance   Balance Assessment standing balance: dynamic   Standing Balance: Dynamic WFL   Activities of Daily Living   BADL Assessment/Intervention lower body dressing   Lower Body Dressing Assessment/Training   Schaller Level (Lower Body Dressing)   (AE education required, pt fatigues easily. )   Instrumental Activities of Daily Living (IADL)   Previous Responsibilities meal prep;housekeeping;laundry;shopping;medication management;finances   IADL Comments fmaily comes to assist as needed, had PCA who just resigned.    Clinical Impression   Criteria for Skilled Therapeutic Interventions Met (OT) yes   OT Diagnosis decreased ADL I   Assessment of Occupational Performance 5 or more Performance Deficits   Identified Performance Deficits dressing, bathing, toileting, G/H,home making, leisure, community mobility.    Planned Therapy Interventions (OT) ADL retraining;IADL retraining;strengthening;transfer  training;home program guidelines;progressive activity/exercise;risk factor education  (fatigue management, EC/WS)   Clinical Decision Making Complexity (OT) low complexity   Therapy Frequency (OT) 5x/week   Predicted Duration of Therapy 1 week   Anticipated Equipment Needs Upon Discharge (OT) dressing equipment   Risk & Benefits of therapy have been explained evaluation/treatment results reviewed;care plan/treatment goals reviewed;risks/benefits reviewed;current/potential barriers reviewed;participants voiced agreement with care plan;participants included;patient   Comment-Clinical Impression pt presents to OT with decreased activity tolerance and at risk of further deconditioning leading to decreased ADL I. Pt to benefit from skilled OT intervention to address the above problem list. Pt currently unable to dress LE without significant SOB.    OT Discharge Planning    OT Discharge Recommendation (DC Rec) Home with assist;home with home care occupational therapy   OT Rationale for DC Rec pt near baseline with mobility only shorter distances 2/2 SOB.    OT Brief overview of current status  Pt ambualting household distances with 1 sitting rest break on 3L NC with O2 sats in high 90's. on RA at rest pt in low 90's, all other VSS on RA. pt mod I bed mobility and SOB with LE dressing.    Total Evaluation Time (Minutes)   Total Evaluation Time (Minutes) 5

## 2021-02-26 NOTE — DISCHARGE SUMMARY
[facility] Discharge Summary       Date of Admission:  2/23/2021  Date of Discharge:  2/26/2021  4:32 PM  Date of Service: 2/26/2021  Discharging Attending Provider: Dr. Malin  Discharge Team: [facility]    Discharge Diagnoses      COPD with acute exacerbation (H)  Acute on chronic respiratory failure with hypoxia (H)  Primary malignant neoplasm of right lower lobe of lung (H)  Type 2 diabetes mellitus without complication, with long-term current use of insulin (H)  Stage 3 chronic kidney disease, unspecified whether stage 3a or 3b CKD  Cigarette smoker    Follow-ups Needed After Discharge   -PCP within 7 days for hospital follow up  -see pulmonologist sooner than previously scheduled - needs assistance with optimizing portable O2 options, home air filter vs housing assistance  -referral placed for pulmonary rehab & sleep study    Hospital Course   Jenn Aparicio is a 65 year old female admitted on 2/23/2021. She has a history of very severe COPD and adenocarcinoma of the lung s/p right lower lobectomy, DM, and HTN and is admitted for acute COPD exacerbation.     # COPD Exacerbation-improving  # Very Severe COPD (FEV1 <30%)  # Acute on Chronic Respiratory Failure (baseline 1.5L at night)  Presented with worsening SOB for the past year with acute worsening in the past month. Requiring higher than baseline O2 requirements, including new daytime O2 needs. Infection unlikely-afebrile without leukocytosis and normal procalciton. CXR negative for infection. D-dimer negative x 2-PE unlikely. EKG was unremarkable, troponin negative x 2-unlikely cardiac etiology. Signs/symptoms consistent with COPD exacerbation that has failed outpatient management. Pt was evaluated by COPD team. Improved with duonebs, antibiotics, and prednisone. Now requiring 2L O2 with activity, which is new.  -prednisone taper for 10 total days  (2/23-3/4)  -levofloxacin for 5 days (2/24-3/1)  -nebulizer equipment ordered  -home O2 ordered  -continue PTA breo & incruse inhalers, albuterol/duonebs PRN  -follow up with pulmonologist  -referral placed for pulmonary rehab & sleep study    Continue to work with pulmonologist regarding home oxygen-struggles with oxygen support devices. Has large concentrator at home but unable to manage her oxygen tanks in order to leave her apartment.     # Lactic Acidosis-resolved  Lactic acid elevated at 5.3, rapid response called. Improved with 3L fluid resuscitation. Likely 2/2 significant albuterol use & dehydration.      # Chest Pain   With negative d-dimer, EKG, troponin-cardiac etiology or PE unlikely. Suspect MSK pain related to COPD exacerbation. Treated with toradol, tylenol, lidocaine patches.    # DM2  # Hyperglycemia  Last A1c 6.8% on 9/8/2020. Patient now on steroids-anticipate increased glucose levels. Resume PTA glipizide at discharge.        # Discharge Pain Plan:   - During her hospitalization, Jenn experienced pain due to COPD exacerbation.  The pain plan for discharge was discussed with Jenn and the plan was created in a collaborative fashion.        Consultations This Hospital Stay   CARE MANAGEMENT / SOCIAL WORK IP CONSULT  IP RESPIRATORY CARE CHRONIC PULMONARY DISEASE SPECIALIST  CARE MANAGEMENT / SOCIAL WORK IP CONSULT  SMOKING CESSATION PROGRAM IP CONSULT  MEDICATION HISTORY IP PHARMACY CONSULT  PHYSICAL THERAPY ADULT IP CONSULT  OCCUPATIONAL THERAPY ADULT IP CONSULT  IP RESPIRATORY CARE CHRONIC PULMONARY DISEASE SPECIALIST    Code Status   Full Code       The patient was discussed with DO Maryse Zaragoza's [facility] Inpatient Service  [facility]   Pager: [phone]  ______________________________________________________________________  Review of Systems   Constitutional: Positive for fatigue. Negative for chills, diaphoresis and fever.   HENT:  Negative for congestion and rhinorrhea.    Respiratory: Positive for cough, chest tightness, shortness of breath and wheezing.    Cardiovascular: Negative for chest pain, palpitations and leg swelling.   Gastrointestinal: Negative for abdominal pain, constipation, diarrhea, nausea and vomiting.   Genitourinary: Negative for difficulty urinating.   Skin: Negative for rash.   Neurological: Negative for dizziness and headaches.   Psychiatric/Behavioral: Negative for confusion.     Physical Exam   Vital Signs: Temp: 97.8  F (36.6  C) Temp src: Oral BP: 111/57 Pulse: 104   Resp: 20 SpO2: 91 % O2 Device: None (Room air) Oxygen Delivery: 1 LPM  Weight: 206 lbs 12.66 oz    Physical Exam  Constitutional:       General: She is not in acute distress.     Appearance: Normal appearance. She is not diaphoretic.   HENT:      Head: Normocephalic and atraumatic.      Right Ear: External ear normal.      Left Ear: External ear normal.      Nose: Nose normal. No congestion or rhinorrhea.      Mouth/Throat:      Mouth: Mucous membranes are moist.   Eyes:      Extraocular Movements: Extraocular movements intact.      Conjunctiva/sclera: Conjunctivae normal.      Pupils: Pupils are equal, round, and reactive to light.   Cardiovascular:      Rate and Rhythm: Normal rate.      Pulses: Normal pulses.      Heart sounds: No murmur.   Pulmonary:      Effort: No respiratory distress.      Comments: Diffuse expiratory and inspiratory wheezing-improved. No crackles. No accessory muscle use or tachypnea.  Chest:      Chest wall: No tenderness.   Abdominal:      General: Abdomen is flat. Bowel sounds are normal. There is no distension.      Palpations: Abdomen is soft.      Tenderness: There is no abdominal tenderness. There is no guarding or rebound.   Musculoskeletal: Normal range of motion.   Skin:     General: Skin is warm and dry.      Capillary Refill: Capillary refill takes less than 2 seconds.   Neurological:      General: No focal deficit  present.      Mental Status: She is alert and oriented to person, place, and time. Mental status is at baseline.      Cranial Nerves: No cranial nerve deficit.   Psychiatric:         Mood and Affect: Mood normal.         Significant Results and Procedures   Most Recent 3 CBC's:  Recent Labs   Lab Test 02/26/21  0438 02/25/21  0620 02/24/21  0631   WBC 9.3 10.5 7.8   HGB 12.1 11.8 13.7   MCV 88 91 91    178 196     Most Recent 3 BMP's:  Recent Labs   Lab Test 02/26/21  1220 02/26/21  0438 02/25/21  0620 02/24/21  1544 02/24/21  0631   NA  --  141 143  --  138   POTASSIUM 4.3 3.3* 4.4  --  4.1   CHLORIDE  --  106 110*  --  102   CO2  --  30 29  --  28   BUN  --  9 11  --  12   CR  --  0.50* 0.50* 0.64 0.55   ANIONGAP  --  5 4  --  7   LUIGI  --  8.8 8.8  --  9.4   GLC  --  102* 188*  --  290*     Most Recent 2 LFT's:  Recent Labs   Lab Test 02/23/21  2021 12/15/20  0759   AST 11 8   ALT 14 14   ALKPHOS 114 108   BILITOTAL 0.1* 0.3     Most Recent 3 Troponin's:  Recent Labs   Lab Test 02/24/21  1544 02/23/21 2021   TROPI <0.015 <0.015   ,   Results for orders placed or performed during the hospital encounter of 02/23/21   XR Chest Port 1 View    Narrative    Chest radiograph 2/23/2021 8:36 PM    HISTORY: Shortness of breath    COMPARISON: CT chest 12/15/2020    FINDINGS:  Single AP view of the chest. Trachea is midline. Cardiac silhouette is  within normal limits. No pneumothorax. Small right pleural effusion.  No significant left pleural effusion. Bilateral upper lobe predominant  emphysematous changes. Streaky bibasilar opacities.      Impression    IMPRESSION:  1. Bibasilar streaky opacities, likely atelectasis, with small right  pleural effusion.  2. Chronic emphysematous changes.    I have personally reviewed the examination and initial interpretation  and I agree with the findings.    EVAN PANDA MD       Pending Results   These results will be followed up by PCP  Unresulted Labs Ordered in the Past 30  Days of this Admission       Date and Time Order Name Status Description    2/24/2021 1148 Blood culture Preliminary     2/24/2021 1148 Blood culture Preliminary                Primary Care Physician   Mitzi Nettles    Discharge Disposition   Discharged to home  Condition at discharge: Stable    Discharge Orders      Home Care PT Referral for Hospital Discharge      Home Care OT Referral for Hospital Discharge      SLEEP EVALUATION & MANAGEMENT REFERRAL - [facility] [phone] (Age 15 and up)      PULMONARY REHAB REFERRAL      MD face to face encounter    Documentation of Face to Face and Certification for Home Health Services    I certify that patient: Jenn Aparicio is under my care and that I, or a nurse practitioner or physician's assistant working with me, had a face-to-face encounter that meets the physician face-to-face encounter requirements with this patient on: 2/26/2021.    This encounter with the patient was in whole, or in part, for the following medical condition, which is the primary reason for home health care: COPD.    I certify that, based on my findings, the following services are medically necessary home health services: Occupational Therapy and Physical Therapy.    My clinical findings support the need for the above services because: Occupational Therapy Services are needed to assess and treat cognitive ability and address ADL safety due to impairment in activity tolerance and Physical Therapy Services are needed to assess and treat the following functional impairments: endurance, balance.    Further, I certify that my clinical findings support that this patient is homebound (i.e. absences from home require considerable and taxing effort and are for medical reasons or [facility] services or infrequently or of short duration when for other reasons) because: Requires assistance of another person or specialized equipment to access medical services because  patient: Is unable to walk greater than 100 feet without rest.    Based on the above findings. I certify that this patient is confined to the home and needs intermittent skilled nursing care, physical therapy and/or speech therapy.  The patient is under my care, and I have initiated the establishment of the plan of care.  This patient will be followed by a physician who will periodically review the plan of care.  Physician/Provider to provide follow up care: Mitzi Nettles    Attending hospital physician (the Medicare certified [place] provider): Dr. Malin  Physician Signature: See electronic signature associated with these discharge orders.  Date: 2/26/2021     Reason for your hospital stay    You were hospitalized for a COPD exacerbation.     Adult [facility] Follow-up and recommended labs and tests    Follow up with primary care provider, Mitzi Nettles, within 7 days for hospital follow- up.     Make appt with pulmonary rehab and for a sleep study. Referrals were placed.    Appointments on [place] and/or [facility] (with [facility] or [facility] provider or service). Call [phone] if you haven't heard regarding these appointments within 7 days of discharge.     Follow Up and recommended labs and tests    Follow up with pulmonologist, within 2-4 weeks.     Activity    Your activity upon discharge: activity as tolerated     When to contact your care team    Call your primary doctor if you have any of the following: temperature greater than 100.2 or  increased shortness of breath.     Discharge Instructions    Continue breo and incruse inhalers.  Continue prednisone taper for a total of 10 days.  Continue levaquin antibiotic for 2 additional days.  New nebulizer supplies were prescribed.  Start using 2L of oxygen with activity.    Make an appt with your pulmonologist as soon as you can. Make an appt with pulmonary rehab and for a sleep study too.     Full Code     Miscellaneous DME Order    DME  Documentation:   Describe the reason for need to support medical necessity: COPD.     I, the undersigned, certify that the above prescribed supplies are medically necessary for this patient and is both reasonable and necessary in reference to accepted standards of medical and necessary in reference to accepted standards of medical practice in the treatment of this patient's condition and is not prescribed as a convenience.     Nebulizer and Nebulizer Supplies    Needs nebulizer compressor, tubing, cups, and mask.    Nebulizer/Supplies Documentation:   The patient was seen 02/26/2021. After assessment, the patient will need to be treated with ongoing nebulizer for treatment/management of very severe COPD.    I, the undersigned, certify that the above prescribed supplies are medically necessary for this patient and is both reasonable and necessary in reference to accepted standards of medical and necessary in reference to accepted standards of medical practice in the treatment of this patient's condition and is not prescribed as a convenience.     Oxygen Adult/Peds    Please issue portable oxygen concentrater.    Oxygen Documentation:   I certify that this patient, Jenn Aparicio has been under my care (or a nurse practitioner or physican's assistant working with me). This is the face-to-face encounter for oxygen medical necessity.      Jenn Aparicio is now in a chronic stable state and continues to require supplemental oxygen. Patient has continued oxygen desaturation due to COPD J44.9.    Alternative treatment(s) tried or considered and deemed clinically infective for treatment of COPD J44.9 include nebulizers, inhalers and steroids.  If portability is ordered, is the patient mobile within the home? yes    **Patients who qualify for home O2 coverage under the CMS guidelines require ABG tests or O2 sat readings obtained closest to, but no earlier than 2 days prior to the discharge, as evidence of the need for home oxygen  therapy. Testing must be performed while patient is in the chronic stable state. See notes for O2 sats.**     Diet    Follow this diet upon discharge: regular diet     Discharge Medications   Discharge Medication List as of 2/26/2021  3:39 PM        START taking these medications    Details   guaiFENesin (ROBITUSSIN) 20 mg/mL SOLN solution Take 10 mLs by mouth every 4 hours as needed for cough, Disp-118 mL, R-0, E-Prescribe      levofloxacin (LEVAQUIN) 750 MG tablet Take 1 tablet (750 mg) by mouth daily for 2 days, Disp-2 tablet, R-0, E-Prescribe      Lidocaine (LIDOCARE) 4 % Patch Place 1 patch onto the skin every 24 hours To prevent lidocaine toxicity, patient should be patch free for 12 hrs daily.Disp-7 patch, [id number]      predniSONE (DELTASONE) 5 MG tablet Take 8 tablets (40 mg) by mouth daily for 2 days, THEN 6 tablets (30 mg) daily for 1 day, THEN 4 tablets (20 mg) daily for 1 day, THEN 2 tablets (10 mg) daily for 1 day, THEN 1 tablet (5 mg) daily for 1 day., Disp-29 tablet, R-0, E-Prescribe           CONTINUE these medications which have CHANGED    Details   fluticasone-vilanterol (BREO ELLIPTA) 200-25 MCG/INH inhaler Inhale 1 puff into the lungs daily, Disp-1 Inhaler, R-0, E-Prescribe      ipratropium - albuterol 0.5 mg/2.5 mg/3 mL (DUONEB) 0.5-2.5 (3) MG/3ML neb solution Take 1 vial (3 mLs) by nebulization every 6 hours as needed for shortness of breath / dyspnea or wheezing, Disp-1080 mL, R-0, E-Prescribe           CONTINUE these medications which have NOT CHANGED    Details   albuterol (PROAIR HFA/PROVENTIL HFA/VENTOLIN HFA) 108 (90 Base) MCG/ACT inhaler INHALE 1 OR 2 PUFFS BY MOUTH EVERY 4 HOURS AS NEEDED, Disp-1 Inhaler, R-10, E-PrescribePharmacy may dispense brand covered by insurance (Proair, or proventil or ventolin or generic albuterol inhaler)      albuterol (PROVENTIL) (2.5 MG/3ML) 0.083% neb solution Take 1 vial (2.5 mg) by nebulization every 6 hours as needed for shortness of breath /  dyspnea or wheezing, Disp-90 mL,R-3, E-Prescribe      Alpha-Lipoic Acid 300 MG CAPS Take 300 mg by mouth daily, Historical      amLODIPine (NORVASC) 10 MG tablet Take 1 tablet (10 mg) by mouth daily, Disp-90 tablet, R-3, E-Prescribe      Aspirin (ASPIR-81 PO) Take  by mouth., Oral, Until Discontinued, Historical      glipiZIDE (GLUCOTROL XL) 2.5 MG 24 hr tablet Take 1 tablet (2.5 mg) by mouth daily, Disp-60 tablet, R-3, E-Prescribe      nicotine (NICODERM CQ) 14 MG/24HR 24 hr patch Place 1 patch onto the skin every 24 hours, Disp-84 patch, R-1, E-Prescribe      sodium chloride (NEBUSAL) 3 % neb solution Take 3 mLs by nebulization daily Use 1-2 times daily in combination with Duoneb and Aerobika to help with mucus clearance, Disp-250 mL, R-3, E-Prescribe      umeclidinium (INCRUSE ELLIPTA) 62.5 MCG/INH inhaler Inhale 1 puff into the lungs daily, Disp-1 each,R-3, E-Prescribe      vitamin D3 (CHOLECALCIFEROL) 50 mcg (2000 units) tablet Take by mouth daily Patient is unsure of dose but takes one tablet daily and things it is 50 mcg, Historical           Allergies   Allergies   Allergen Reactions    Aspirin      325mg     Colon Care     Penicillins

## 2021-02-28 NOTE — PLAN OF CARE
Occupational Therapy Discharge Summary    Reason for therapy discharge:    Discharged to home.    Progress towards therapy goal(s). See goals on Care Plan in [facility] electronic health record for goal details.  Goals partially met.  Barriers to achieving goals:   discharge from facility.    Therapy recommendation(s):    Continued therapy is recommended.  Rationale/Recommendations:  home therapy recommended to maximize ADL independence and safety in home environment.

## 2021-03-01 ENCOUNTER — TELEPHONE (OUTPATIENT)
Dept: INTERNAL MEDICINE | Facility: CLINIC | Age: 66
End: 2021-03-01

## 2021-03-01 NOTE — TELEPHONE ENCOUNTER
[facility] now requests orders and shares plan of care/discharge summaries for some patients through [facility].  Please REPLY TO THIS MESSAGE OR ROUTE BACK TO THE AUTHOR in order to give authorization for orders when needed.  This is considered a verbal order, you will still receive a faxed copy of orders for signature.  Thank you for your assistance in improving collaboration for our patients.    ORDER  Pt was seen today for home care admission s/p hospitalization for COPD exacerbation. I am requesting the following home care orders:    1. PT 1x1, 2x3 for LE strengthening, activity tolerance trng.  2. OT eval and treat for ADLs, energy conservation.    Medication reconciliation was completed at visit today. The following are differences from what pt is taking vs Epic list.    1. Alpha lipoic acid: pt takes 200 mg daily (not 300mg).  2. Pt takes Co Q10 100 mg daily.  3. Pt takes glucosamine 500mg daily.  4. Pt is currently not taking Incruse Ellipta inhaler, was told not to upon hospital discharge.  5. Pt is not taking nebusal, does not have this. (This may be one of the meds prescribed just prior to admission that she never got.)  6. Pt is not currently using Proventil neb.  7.Duplicate warning between Duoneb, Pro Air inhaler.  8. Pt needs prescription for Nicoderm patches sent in to [facility] pharmacy in [facility] on [address].    Please let me know if you want changes on any of these meds or if you want me to check with pulmonology on the inhaler/nebs. I'm not sure how much of that you handle.    Thank you!  April Bonilla, PT

## 2021-03-02 ENCOUNTER — TELEPHONE (OUTPATIENT)
Dept: RESPIRATORY THERAPY | Facility: CLINIC | Age: 66
End: 2021-03-02

## 2021-03-02 ENCOUNTER — DOCUMENTATION ONLY (OUTPATIENT)
Dept: CARE COORDINATION | Facility: CLINIC | Age: 66
End: 2021-03-02

## 2021-03-02 ENCOUNTER — VIRTUAL VISIT (OUTPATIENT)
Dept: INTERNAL MEDICINE | Facility: CLINIC | Age: 66
End: 2021-03-02
Payer: COMMERCIAL

## 2021-03-02 DIAGNOSIS — Z72.0 CURRENT TOBACCO USE: ICD-10-CM

## 2021-03-02 DIAGNOSIS — J44.1 CHRONIC OBSTRUCTIVE PULMONARY DISEASE WITH ACUTE EXACERBATION (H): ICD-10-CM

## 2021-03-02 DIAGNOSIS — R07.89 OTHER CHEST PAIN: Primary | ICD-10-CM

## 2021-03-02 LAB
BACTERIA SPEC CULT: NO GROWTH
BACTERIA SPEC CULT: NO GROWTH
SPECIMEN SOURCE: NORMAL
SPECIMEN SOURCE: NORMAL

## 2021-03-02 PROCEDURE — 99443 PR PHYSICIAN TELEPHONE EVALUATION 21-30 MIN: CPT | Mod: 95 | Performed by: NURSE PRACTITIONER

## 2021-03-02 RX ORDER — LIDOCAINE 4 G/G
1 PATCH TOPICAL EVERY 24 HOURS
Qty: 7 PATCH | Refills: 3 | Status: SHIPPED | OUTPATIENT
Start: 2021-03-02 | End: 2021-05-12

## 2021-03-02 RX ORDER — NICOTINE 21 MG/24HR
1 PATCH, TRANSDERMAL 24 HOURS TRANSDERMAL EVERY 24 HOURS
Qty: 84 PATCH | Refills: 1 | Status: SHIPPED | OUTPATIENT
Start: 2021-03-02 | End: 2021-12-08

## 2021-03-02 RX ORDER — GABAPENTIN 300 MG/1
300 CAPSULE ORAL AT BEDTIME
Qty: 30 CAPSULE | Refills: 1 | Status: SHIPPED | OUTPATIENT
Start: 2021-03-02 | End: 2021-06-02

## 2021-03-02 ASSESSMENT — PAIN SCALES - GENERAL: PAINLEVEL: WORST PAIN (10)

## 2021-03-02 NOTE — NURSING NOTE
Chief Complaint   Patient presents with     Hospital F/U     Hospital follow up.     Lorraine Little, LETA 11:55 AM  3/2/2021

## 2021-03-02 NOTE — PATIENT INSTRUCTIONS
Primary Care Center Phone Number [phone]  Primary [facility] Medication Refill Request Information:  * Please contact your pharmacy regarding ANY request for medication refills.  ** [facility] Prescription Fax = [phone]  * Please allow 3 business days for routine medication refills.  * Please allow 5 business days for controlled substance medication refills.     [facility] Test Result notification information:  *You will be notified with in 7-10 days of your appointment day regarding the results of your test.  If you are on MyChart you will be notified as soon as the provider has reviewed the results and signed off on them.      If you have questions regarding Covid-19 and the Covid-19 vaccine, please visit this website.    [email or web address]    
ROM intact

## 2021-03-02 NOTE — TELEPHONE ENCOUNTER
Attempted to contact patient today for ongoing COPD education and smoking cessation counseling. There was no answer so a message was left that included our call back number should she have a need to contact us prior to our next call.    SILVESTRE Mistry, RRT, CTTS  Chronic Pulmonary Disease Specialist  Office: [phone]   Pager: [phone]

## 2021-03-02 NOTE — PROGRESS NOTES
"Jenn is a 65 year old who is being evaluated via a billable telephone visit.      What phone number would you like to be contacted at? [phone]   How would you like to obtain your AVS? Kell Barajas is a 65 year old who presents for the following health issues :post hospital discharge after being hospitalized 2/23/21 - 2/26/21 for COPD exacerbation.     HEMAL Barajas is a 65 year old female admitted on 2/23/2021. She has a history of very severe COPD and adenocarcinoma of the lung s/p right lower lobectomy, DM, and HTN and is admitted for acute COPD exacerbation.  Pt was evaluated by the COPD team.She  improved with duonebs, antibiotics, and prednisone. Now requiring 2L O2 with activity, which is new.  -prednisone taper for 10 total days (2/23-3/4). SHe has 2 days left of prednisone.   -levofloxacin for 5 days (2/24-3/1)  -nebulizer equipment ordered  -home O2 ordered  -continue PTA breo & incruse inhalers, albuterol/duonebs PRN  -follow up with pulmonologist.  She has an appt 4/5/21.  Chest CT ordered for 4/2/21.  -referral placed for pulmonary rehab & sleep study.  She states RT is coming to her house.  SHe has not heard from Sleep Clinic yet.      She has not yet received portable O2.   Her sugars have been high (likely due to prednisone).     States she is having pain in her chest \"all the time\" which bothers her sleep. Asking for a pain medication.  In hospital she was given IV Toradol and lidocaine patches.     She has a PCA.     She requests ore Nicoderm patches.    Patient Active Problem List   Diagnosis     Vaginitis     Postmenopausal bleeding     Cervical stenosis (uterine cervix)     Adenocarcinoma of right lung (H)     Pulmonary emphysema (H)     Type 2 diabetes mellitus without complication, without long-term current use of insulin (H)     Primary lung adenocarcinoma (H) STAGE: IA2 [id number] poorly diff adeno RLL, then [id number] IA2 suspected NSCLC RUL, medically inoperable      " Chronic obstructive pulmonary disease, unspecified COPD type (H)       Review of Systems   See above.       Objective    Vitals - Patient Reported  Pain Score: Worst Pain (10)     alert and mild distress.    PSYCH: Alert and oriented times 3; coherent speech, normal   rate and volume.Her affect is normal  RESP: No cough, no audible wheezing, She speaks in short phrases, sounds breathless.  Remainder of exam unable to be completed due to telephone visits    Assessment  COPD  -      WIll follow-up on Home portable oxygen.  _     Will send rx for gabapentin at hs for pain at HS.     Phone call duration: 21 minutes.  Total time spent today with this patient including chart review, exam time with patient and documentation : 43 minutes

## 2021-03-03 NOTE — TELEPHONE ENCOUNTER
OK approval for    1. PT 1x1, 2x3 for LE strengthening, activity tolerance trng.  2. OT eval and treat for ADLs, energy conservation.    Waiting on provider response to medication questions.   Ilana De La Garza, EMT at 8:22 AM on 3/3/2021.

## 2021-03-05 ENCOUNTER — DOCUMENTATION ONLY (OUTPATIENT)
Dept: CARE COORDINATION | Facility: CLINIC | Age: 66
End: 2021-03-05

## 2021-03-05 NOTE — PROGRESS NOTES
[facility] now requests orders and shares plan of care/discharge summaries for some patients through [facility].  Please REPLY TO THIS MESSAGE OR ROUTE BACK TO THE AUTHOR in order to give authorization for orders when needed.  This is considered a verbal order, you will still receive a faxed copy of orders for signature.  Thank you for your assistance in improving collaboration for our patients.    ORDER    SN eval and treat for medication education, questions and concerns.      Please address medication discrepancies from encounter on 3/3/21 as well.      Nancy Nunez RN  [phone]

## 2021-03-08 ENCOUNTER — TELEPHONE (OUTPATIENT)
Dept: INTERNAL MEDICINE | Facility: CLINIC | Age: 66
End: 2021-03-08

## 2021-03-08 NOTE — TELEPHONE ENCOUNTER
[facility] Call Center    Phone Message    May a detailed message be left on voicemail: yes     Reason for Call: Other: The nurse calling to get orders signed for her documentation she has in the chart. Please have Mitzi Nettles look at it as soon as she can.     Action Taken: Message routed to:  [facility]      Travel Screening: Not Applicable

## 2021-03-15 ENCOUNTER — TELEPHONE (OUTPATIENT)
Dept: INTERNAL MEDICINE | Facility: CLINIC | Age: 66
End: 2021-03-15

## 2021-03-15 NOTE — TELEPHONE ENCOUNTER
[facility] now requests orders and shares plan of care/discharge summaries for some patients through [facility].  Please REPLY TO THIS MESSAGE OR ROUTE BACK TO THE AUTHOR in order to give authorization for orders when needed.  This is considered a verbal order, you will still receive a faxed copy of orders for signature.  Thank you for your assistance in improving collaboration for our patients.    ORDER    OT to continue skilled tx 2w2, 1w2 to address the following goals  1. Pt will be provided with recommendations on AE to increase ease with toilet transfers.  2. Pt will demonstrate independent following of home exercise program verbalizing frequency, duration and precautions for decreased episodes of UI.  3. Pt will be provided with recommendations for increased ease with G/H through use of AE and modified techniques.    4. Pt will demonstrate increased positioning when lying down to promote decreased SOB and decreased RUE pain.  5. Pt will verbalize understanding and demonstrate Independent use of 3 energy conservation techniques for performing ADLs/IADLs, such as showering and dressing.   6. Pt will demonstrate Independent with breathing techniques to manage shortness of breath when performing ADLs/IADLs, such as showering and dressing.    7. Pt will demonstrate progressive aerobic Home Exercise Program up to 10 minutes to increase functional endurance for performing ADLs/IADLs, such as showering and dressing.     8. Pt will demonstrate understanding of BUE ROM HEP to decrease risk for contractures.

## 2021-03-16 NOTE — TELEPHONE ENCOUNTER
Erica calling requesting a return call for verbal orders, Erica states she is in the patients parking lot and would like to see her but can not with out the orders. Please call to give verbal thank you.

## 2021-03-16 NOTE — TELEPHONE ENCOUNTER
Called Erica and gave verbal orders per Mitzi Nettles CNP for OT to continue skilled tx 2w2, 1w2 to address the following goals and the 8 orders listed below.      Brodie Negron CMA ([facility]) at 12:51 PM on 3/16/2021

## 2021-03-18 ENCOUNTER — TELEPHONE (OUTPATIENT)
Dept: PULMONOLOGY | Facility: CLINIC | Age: 66
End: 2021-03-18

## 2021-03-18 ENCOUNTER — TELEPHONE (OUTPATIENT)
Dept: INTERNAL MEDICINE | Facility: CLINIC | Age: 66
End: 2021-03-18

## 2021-03-18 DIAGNOSIS — N18.30 TYPE 2 DIABETES MELLITUS WITH STAGE 3 CHRONIC KIDNEY DISEASE, WITHOUT LONG-TERM CURRENT USE OF INSULIN, UNSPECIFIED WHETHER STAGE 3A OR 3B CKD (H): ICD-10-CM

## 2021-03-18 DIAGNOSIS — C34.91 ADENOCARCINOMA OF RIGHT LUNG (H): ICD-10-CM

## 2021-03-18 DIAGNOSIS — E11.22 TYPE 2 DIABETES MELLITUS WITH STAGE 3 CHRONIC KIDNEY DISEASE, WITHOUT LONG-TERM CURRENT USE OF INSULIN, UNSPECIFIED WHETHER STAGE 3A OR 3B CKD (H): ICD-10-CM

## 2021-03-18 DIAGNOSIS — J44.1 CHRONIC OBSTRUCTIVE PULMONARY DISEASE WITH ACUTE EXACERBATION (H): Primary | ICD-10-CM

## 2021-03-18 NOTE — TELEPHONE ENCOUNTER
[facility] Call Center    Phone Message    May a detailed message be left on voicemail: yes     Reason for Call: Order(s): Home Care Orders: Other: .  [facility] Fax: [phone]  Kanwal is calling to request for a  DME:  1. Toilet safety frame:reach a width of at least 32   2. Foam wedge support: 26 x25  at least 7    tall   3. Long Handled Hair brush  4. Hand Healed Shower head   5. Wide Sock-aid  6. 26  Reacher   Please call kanwal once the orders have been faxed over to [facility] /supplies thank you.  Action Taken: Message routed to:  [facility] p[facility]    Travel Screening: Not Applicable

## 2021-03-25 DIAGNOSIS — Z53.9 DIAGNOSIS NOT YET DEFINED: Primary | ICD-10-CM

## 2021-03-25 PROCEDURE — G0180 MD CERTIFICATION HHA PATIENT: HCPCS | Performed by: NURSE PRACTITIONER

## 2021-03-25 NOTE — TELEPHONE ENCOUNTER
6 DME orders faxed.      Brodie Negron CMA ([facility]) at 3:10 PM on 3/25/2021

## 2021-03-25 NOTE — TELEPHONE ENCOUNTER
Mark called to check status of home care equipment orders. Please follow up with Mark to advise. Thank you!

## 2021-03-30 ENCOUNTER — VIRTUAL VISIT (OUTPATIENT)
Dept: SLEEP MEDICINE | Facility: CLINIC | Age: 66
End: 2021-03-30
Attending: STUDENT IN AN ORGANIZED HEALTH CARE EDUCATION/TRAINING PROGRAM
Payer: COMMERCIAL

## 2021-03-30 VITALS — WEIGHT: 200 LBS | HEIGHT: 66 IN | BODY MASS INDEX: 32.14 KG/M2

## 2021-03-30 DIAGNOSIS — J44.1 CHRONIC OBSTRUCTIVE PULMONARY DISEASE WITH ACUTE EXACERBATION (H): ICD-10-CM

## 2021-03-30 DIAGNOSIS — Z20.822 COVID-19 RULED OUT: ICD-10-CM

## 2021-03-30 DIAGNOSIS — G47.9 SLEEP DISTURBANCE: Primary | ICD-10-CM

## 2021-03-30 PROCEDURE — 99443 PR PHYSICIAN TELEPHONE EVALUATION 21-30 MIN: CPT | Mod: 95 | Performed by: INTERNAL MEDICINE

## 2021-03-30 ASSESSMENT — MIFFLIN-ST. JEOR: SCORE: 1468.94

## 2021-03-30 NOTE — PROGRESS NOTES
"Jenn Aparicio is a 65 year old female being evaluated via a billable telephone visit.     \"This telephone visit will be conducted via a call between you and your physician/provider. We have found that certain health care needs can be provided without the need for an in-person visit or physical exam.  This service lets us provide the care you need with a telephone conversation.  If a prescription is necessary we can send it directly to your pharmacy.  If lab work is needed we can place an order for that and you can then stop by our lab to have the test done at a later time.\"    Telephone visits are billed at different rates depending on your insurance coverage.  Please reach out to your insurance provider with any questions.    Patient has given verbal consent for  a Telephone visit? Yes    What telephone number would you like your provider to contact at at:  [phone]    How would you like to obtain your AVS? Mail a copy    Nadiya Pereira CMA    Telephone Visit Details:     Telephone Visit Start Time: 10:58AM    Telephone Visit End Time:11:24 AM      Sleep Consultation Note:    Date on this visit: 3/30/2021    Jenn Aparicio is sent by Darius Aragon for a sleep consultation regarding evaluation for sleep apnea    Primary Physician: Mitzi Nettles     Chief complaint: snoring,  concern for sleep apnea    Jenn Aparicio 65 year old female with h/o severe COPD , adenocarcinoma of the lung s/p right lower lobectomy, DM, Stage 3 chronic kidney disease,and HTN. She  had a previous sleep study at [facility] [place] several years ago. The PSG report is unavailable. She mentioned that CPAP was not recommended after the study. She reports weight gain since the PSG.    She has been on O2 for several years during sleep at 2 LPM. She receives O2 through [facility]. Since the recent hospitalization in Feb 2021, she has been using O2 at 2 LPM during the day with activity.    Recent hospitalization: She was admitted on " 2/23/2021 for acute COPD exacerbation, acute on chronic respiratory failure with hypoxia and was discharged on 2/26/21.      Sleep Disordered Breathing  Jenn reports  snoring, choking(particulary if she sleeps on her back)/gasping for air, witnessed apneas, dry mouth, morning headaches, non-refreshing sleep, daytime sleepiness/fatigue.    Sleep Schedule/Sleep Complaints//Daytime Functioning  Jenn goes to bed between  PM and wakes up around 6-630AM.  It usually takes 15-20 minutes to fall asleep.  She wakes up 4-5 times throughout the night.  Night time awakenings occurs due to choking/breathing issues/noise.  She reports non-refreshing sleep, daytime sleepiness/fatigue.  Patient  has not  been driving for past 4 months. Prior to that she denied drowsiness while driving.  Jenn naps occasionally, feels unrefreshed after naps.    SLEEP SCALES:  Patient's [place] Sleepiness score 4/24   Insomnia severity index:22    Restless Legs symptoms  Jenn does not complain of restlessness feelings in the legs. She reports neuropathy(DM related) affecting hands/feet    Sleep Behaviors  She denies any night time behaviors - sleep walking, sleep talking, sleep eating.  She does not complain acting out dreams.    Allergies:    Allergies   Allergen Reactions     Interferons Dermatitis     Penicillins Hives     Aspirin      325mg      Codeine      Colon Care        Medications:    Current Outpatient Medications   Medication Sig Dispense Refill     albuterol (PROAIR HFA/PROVENTIL HFA/VENTOLIN HFA) 108 (90 Base) MCG/ACT inhaler INHALE 1 OR 2 PUFFS BY MOUTH EVERY 4 HOURS AS NEEDED 1 Inhaler 10     Alpha-Lipoic Acid 300 MG CAPS Take 300 mg by mouth daily       amLODIPine (NORVASC) 10 MG tablet Take 1 tablet (10 mg) by mouth daily 90 tablet 3     Aspirin (ASPIR-81 PO) Take  by mouth.       fluticasone-vilanterol (BREO ELLIPTA) 200-25 MCG/INH inhaler Inhale 1 puff into the lungs daily 1 Inhaler 0     gabapentin (NEURONTIN) 300 MG  capsule Take 1 capsule (300 mg) by mouth At Bedtime 30 capsule 1     glipiZIDE (GLUCOTROL XL) 2.5 MG 24 hr tablet Take 1 tablet (2.5 mg) by mouth daily 60 tablet 3     guaiFENesin (ROBITUSSIN) 20 mg/mL SOLN solution Take 10 mLs by mouth every 4 hours as needed for cough 118 mL 0     ipratropium - albuterol 0.5 mg/2.5 mg/3 mL (DUONEB) 0.5-2.5 (3) MG/3ML neb solution Take 1 vial (3 mLs) by nebulization every 6 hours as needed for shortness of breath / dyspnea or wheezing 1080 mL 0     Lidocaine (LIDOCARE) 4 % Patch Place 1 patch onto the skin every 24 hours To prevent lidocaine toxicity, patient should be patch free for 12 hrs daily. 7 patch 3     nicotine (NICODERM CQ) 14 MG/24HR 24 hr patch Place 1 patch onto the skin every 24 hours 84 patch 1     sodium chloride (NEBUSAL) 3 % neb solution Take 3 mLs by nebulization daily Use 1-2 times daily in combination with Duoneb and Aerobika to help with mucus clearance 250 mL 3     umeclidinium (INCRUSE ELLIPTA) 62.5 MCG/INH inhaler Inhale 1 puff into the lungs daily 1 each 3     vitamin D3 (CHOLECALCIFEROL) 50 mcg (2000 units) tablet Take by mouth daily Patient is unsure of dose but takes one tablet daily and things it is 50 mcg       albuterol (PROVENTIL) (2.5 MG/3ML) 0.083% neb solution Take 1 vial (2.5 mg) by nebulization every 6 hours as needed for shortness of breath / dyspnea or wheezing 90 mL 3       Problem List:  Patient Active Problem List    Diagnosis Date Noted     Chronic obstructive pulmonary disease, unspecified COPD type (H) 02/23/2021     Priority: Medium     Primary lung adenocarcinoma (H) STAGE: IA2 [id number] poorly diff adeno RLL, then [id number] IA2 suspected NSCLC RUL, medically inoperable  12/15/2020     Priority: Medium     Adenocarcinoma of right lung (H) 09/22/2020     Priority: Medium     12/24/15            PET/CT  1/13/15              CT lung bx. Adenocarcinoma  2/8/16                R thoracotomy, RLL lobectomy (Dr. Cunha). Adenocarcinoma, 1.1  cm, assoicated with atypical adenomatous hyperplasia  10/18/19            CTA for shortness of breath. New 1.3 cm RUL nodule  11/4~6/19          PET/CT. 1.1 cm RUL hypermetabolic nodule (SUV 12.6)  12/26/19            Brain MRI negative for mets  1/14~1/28/20     SBRT to RUL nodule, 5000 cGy, every other day, 1000 cGy (Dr. Currie at [facility] [place] No bx due to O2 dependence and too much risk       Pulmonary emphysema (H) 09/22/2020     Priority: Medium     Type 2 diabetes mellitus without complication, without long-term current use of insulin (H) 01/09/2019     Priority: Medium     Cervical stenosis (uterine cervix) 03/18/2011     Priority: Medium     Vaginitis 03/14/2011     Priority: Medium     Postmenopausal bleeding 03/14/2011     Priority: Medium        Past Medical/Surgical History:  Past Medical History:   Diagnosis Date     Adenocarcinoma, lung (H)      Asthma      Ectopic pregnancy      Pulmonary emphysema (H)     Very severe FEV1<30% predicted     Type II diabetes mellitus (H)      No past surgical history on file.    Social History: She drinks 1 cup of coffee or tea and 1-2 cans of pop /day. She does not drink alcohol . She has been smoking cigarettes for > 30 years. Patient does  not use drugs. Does not use medical marijuana.  Social History     Socioeconomic History     Marital status: Single     Spouse name: Not on file     Number of children: Not on file     Years of education: Not on file     Highest education level: Not on file   Occupational History     Not on file   Social Needs     Financial resource strain: Not on file     Food insecurity     Worry: Not on file     Inability: Not on file     Transportation needs     Medical: Not on file     Non-medical: Not on file   Tobacco Use     Smoking status: Current Every Day Smoker     Packs/day: 0.25     Types: Cigarettes     Smokeless tobacco: Former User     Tobacco comment: 2/24/21 Patient has quit as of 2/12/21. Patient will be considered former smokers if she  "remains smoke-free for 6 months   Substance and Sexual Activity     Alcohol use: Yes     Comment: sometime     Drug use: No     Sexual activity: Not Currently     Partners: Male   Lifestyle     Physical activity     Days per week: Not on file     Minutes per session: Not on file     Stress: Not on file   Relationships     Social connections     Talks on phone: Not on file     Gets together: Not on file     Attends Jewish service: Not on file     Active member of club or organization: Not on file     Attends meetings of clubs or organizations: Not on file     Relationship status: Not on file     Intimate partner violence     Fear of current or ex partner: Not on file     Emotionally abused: Not on file     Physically abused: Not on file     Forced sexual activity: Not on file   Other Topics Concern     Not on file   Social History Narrative    Caffeine intake/servings daily - 2    Calcium intake/servings daily - 1    Exercise 0 times weekly - describe     Sunscreen used - No    Seatbelts used - Yes    Guns stored in the home - No    Self Breast Exam - Yes    Pap test up to date -  Not sure    Eye exam up to date -  No    Dental exam up to date -  No    DEXA scan up to date -  No    Flex Sig/Colonoscopy up to date -  Not Applicable    Mammography up to date -  No    Immunizations reviewed and up to date - No    Abuse: Current or Past (Physical, Sexual or Emotional) - No    Do you feel safe in your environment - Yes    Do you cope well with stress - Yes    Do you suffer from insomnia - Yes    Last updated by: Sherry Negron MA 3/14/2011                           Family History:  Family History   Problem Relation Age of Onset     Depression Daughter      Alcohol/Drug Other         self     Diabetes Other         self     Thyroid Disease Other         self     Asthma Other         self            Physical Examination:  Vitals: Ht 1.676 m (5' 6\")   Wt 90.7 kg (200 lb)   BMI 32.28 kg/m    BMI= Body mass index is 32.28 " kg/m .    [place] Total Score 3/30/2021   Total score - [place] 4     General: No apparent distress   Chest: No cough, no audible wheezing, able to talk in full sentences  Psych: coherent speech, normal rate and volume, able to articulate logical thoughts, able   to abstract reason, no tangential thoughts, no hallucinations   or delusions   Her affect is normal  Neuro:  Mental status: Alert and  Oriented X 3  Speech: normal     OTHER TESTS/LABS:   I have reviewed the labs and made my comment in the assessment and plan.  Pulmonary function tests:(12/15/2020)  The FVC, FEV1 and FEV1/FVC ratio are reduced.  The inspiratory flow rates are reduced.  The TLC, RV, FRC and RV/TLC ratio are all increased indicating overinflation and air trapping.  Following administration of bronchodilators, there is a significant   response.  The diffusing capacity is reduced.   IMPRESSION:   Very Severe Airflow Obstruction with a significant bronchodilator response.     Severe diffusion defect.   6MWT:  Walk distance is reduced on room air without significant desaturation or hypoxia.     Last Comprehensive Metabolic Panel:  Sodium   Date Value Ref Range Status   02/26/2021 141 133 - 144 mmol/L Final     Potassium   Date Value Ref Range Status   02/26/2021 4.3 3.4 - 5.3 mmol/L Final     Chloride   Date Value Ref Range Status   02/26/2021 106 94 - 109 mmol/L Final     Carbon Dioxide   Date Value Ref Range Status   02/26/2021 30 20 - 32 mmol/L Final     Anion Gap   Date Value Ref Range Status   02/26/2021 5 3 - 14 mmol/L Final     Glucose   Date Value Ref Range Status   02/26/2021 102 (H) 70 - 99 mg/dL Final     Urea Nitrogen   Date Value Ref Range Status   02/26/2021 9 7 - 30 mg/dL Final     Creatinine   Date Value Ref Range Status   02/26/2021 0.50 (L) 0.52 - 1.04 mg/dL Final     GFR Estimate   Date Value Ref Range Status   02/26/2021 >90 >60 mL/min/[1.73_m2] Final     Comment:     Non  GFR Calc  Starting 12/18/2018, serum  "creatinine based estimated GFR (eGFR) will be   calculated using the Chronic Kidney Disease Epidemiology Collaboration   (CKD-EPI) equation.       Calcium   Date Value Ref Range Status   02/26/2021 8.8 8.5 - 10.1 mg/dL Final     Bilirubin Total   Date Value Ref Range Status   02/23/2021 0.1 (L) 0.2 - 1.3 mg/dL Final     Alkaline Phosphatase   Date Value Ref Range Status   02/23/2021 114 40 - 150 U/L Final     ALT   Date Value Ref Range Status   02/23/2021 14 0 - 50 U/L Final     AST   Date Value Ref Range Status   02/23/2021 11 0 - 45 U/L Final         Impression/Plan:  Snoring, observed apneas,  non restorative sleep, fatigue, EDS. Possible Obstructive sleep apnea  STOP BANG score is 4/8. Patient likely has sleep apnea based on clinical history, body habitus, post menopausal status   Recommend  in-lab sleep study with TCM to evaluate for sleep related breathing disorder .   Polysomnography reviewed.  Obstructive sleep apnea reviewed.  Information provided regarding treatment options for DAVID.     Severe COPD: she will continue f/u with Pulmonary.    Chronic tobacco use: Patient has been encouraged to quit smoking.    Obesity: We discussed weight management with healthy diet, and exercise.    Patient was strongly advised to avoid driving, operating any heavy machinery or other hazardous situations while drowsy or sleepy.  Patient was counseled on the importance of driving while alert, to pull over if drowsy, or nap before getting into the vehicle if sleepy.      Plan is to communicate results of sleep study in 7-10 days.     CC: Darius Aragon    The above note was dictated using voice recognition software. Although reviewed after completion, some word and grammatical error may remain . Please contact the author for any clarifications.    \"I spent a total of 45 Minutes with Jenn Aparicio during today's telephone visit, most  of this time was spent counseling the patient and  coordinating care regarding sleep " "related breathing disorder,  PSG, smoking cessation, weight management , and chart review., including documentation and further activities as noted above.\" \"     Vilma Montejo MD  [facility]   Fl[address], [address]   [address] [place], [place] [address]. [place] [address]   Appointments: [phone]                               "

## 2021-03-30 NOTE — PATIENT INSTRUCTIONS
Your BMI is Body mass index is 32.28 kg/m .  Weight management is a personal decision.  If you are interested in exploring weight loss strategies, the following discussion covers the approaches that may be successful. Body mass index (BMI) is one way to tell whether you are at a healthy weight, overweight, or obese. It measures your weight in relation to your height.  A BMI of 18.5 to 24.9 is in the healthy range. A person with a BMI of 25 to 29.9 is considered overweight, and someone with a BMI of 30 or greater is considered obese. More than two-thirds of American adults are considered overweight or obese.  Being overweight or obese increases the risk for further weight gain. Excess weight may lead to heart disease and diabetes.  Creating and following plans for healthy eating and physical activity may help you improve your health.  Weight control is part of healthy lifestyle and includes exercise, emotional health, and healthy eating habits. Careful eating habits lifelong are the mainstay of weight control. Though there are significant health benefits from weight loss, long-term weight loss with diet alone may be very difficult to achieve- studies show long-term success with dietary management in less than 10% of people. Attaining a healthy weight may be especially difficult to achieve in those with severe obesity. In some cases, medications, devices and surgical management might be considered.  What can you do?  If you are overweight or obese and are interested in methods for weight loss, you should discuss this with your provider.     Consider reducing daily calorie intake by 500 calories.     Keep a food journal.     Avoiding skipping meals, consider cutting portions instead.    Diet combined with exercise helps maintain muscle while optimizing fat loss. Strength training is particularly important for building and maintaining muscle mass. Exercise helps reduce stress, increase energy, and improves fitness.  "Increasing exercise without diet control, however, may not burn enough calories to loose weight.       Start walking three days a week 10-20 minutes at a time    Work towards walking thirty minutes five days a week     Eventually, increase the speed of your walking for 1-2 minutes at time    In addition, we recommend that you review healthy lifestyles and methods for weight loss available through the National Institutes of Health patient information sites:  http://win.niddk.nih.gov/publications/index.htm    And look into health and wellness programs that may be available through your health insurance provider, employer, local community center, or tonya club.    Weight management plan: Patient was referred to their PCP to discuss a diet and exercise plan.    MY TREATMENT INFORMATION FOR SLEEP APNEA-  Jenn Aparicio    DOCTOR : Vilma Montejo MD    Am I having a sleep study at a sleep center?  --->Due to insurance clearance delays, you will be contacted within 2-4 weeks to schedule    Am I having a home sleep study?  --->Watch the video for the device you are using:    /drop off device-   https://www.[employer].com/watch?v=yGGFBdELGhk    Disposable device sent out require phone/computer application-   https://www.[employer].com/watch?v=BCce_vbiwxE      Frequently asked questions:  1. What is Obstructive Sleep Apnea (DAVID)? DAVID is the most common type of sleep apnea. Apnea means, \"without breath.\"  Apnea is most often caused by narrowing or collapse of the upper airway as muscles relax during sleep.   Almost everyone has occasional apneas. Most people with sleep apnea have had brief interruptions at night frequently for many years.  The severity of sleep apnea is related to how frequent and severe the events are.   2. What are the consequences of DAVID? Symptoms include: feeling sleepy during the day, snoring loudly, gasping or stopping of breathing, trouble sleeping, and occasionally morning " headaches or heartburn at night.  Sleepiness can be serious and even increase the risk of falling asleep while driving. Other health consequences may include development of high blood pressure and other cardiovascular disease in persons who are susceptible. Untreated DAVID  can contribute to heart disease, stroke and diabetes.   3. What are the treatment options? In most situations, sleep apnea is a lifelong disease that must be managed with daily therapy. Medications are not effective for sleep apnea and surgery is generally not considered until other therapies have been tried. Your treatment is your choice . Continuous Positive Airway (CPAP) works right away and is the therapy that is effective in nearly everyone. An oral device to hold your jaw forward is usually the next most reliable option. Other options include postioning devices (to keep you off your back), weight loss, and surgery including a tongue pacing device. There is more detail about some of these options below.  4. Are my sleep studies covered by insurance? Although we will request verification of coverage, we advise you also check in advance of the study to ensure there is coverage.    Important tips for those choosing CPAP and similar devices   Know your equipment:  CPAP is continuous positive airway pressure that prevents obstructive sleep apnea by keeping the throat from collapsing while you are sleeping. In most cases, the device is  smart  and can slowly self-adjusts if your throat collapses and keeps a record every day of how well you are treated-this information is available to you and your care team.  BPAP is bilevel positive airway pressure that keeps your throat open and also assists each breath with a pressure boost to maintain adequate breathing.  Special kinds of BPAP are used in patients who have inadequate breathing from lung or heart disease. In most cases, the device is  smart  and can slowly self-adjusts to assist breathing. Like  CPAP, the device keeps a record of how well you are treated.  Your mask is your connection to the device. You get to choose what feels most comfortable and the staff will help to make sure if fits. Here: are some examples of the different masks that are available:       Key points to remember on your journey with sleep apnea:  1. Sleep study.  PAP devices often need to be adjusted during a sleep study to show that they are effective and adjusted right.  2. Good tips to remember: Try wearing just the mask during a quiet time during the day so your body adapts to wearing it. A humidifier is recommended for comfort in most cases to prevent drying of your nose and throat. Allergy medication from your provider may help you if you are having nasal congestion.  3. Getting settled-in. It takes more than one night for most of us to get used to wearing a mask. Try wearing just the mask during a quiet time during the day so your body adapts to wearing it. A humidifier is recommended for comfort in most cases. Our team will work with you carefully on the first day and will be in contact within 4 days and again at 2 and 4 weeks for advice and remote device adjustments. Your therapy is evaluated by the device each day.   4. Use it every night. The more you are able to sleep naturally for 7-8 hours, the more likely you will have good sleep and to prevent health risks or symptoms from sleep apnea. Even if you use it 4 hours it helps. Occasionally all of us are unable to use a medical therapy, in sleep apnea, it is not dangerous to miss one night.   5. Communicate. Call our skilled team on the number provided on the first day if your visit for problems that make it difficult to wear the device. Over 2 out of 3 patients can learn to wear the device long-term with help from our team. Remember to call our team or your sleep providers if you are unable to wear the device as we may have other solutions for those who cannot adapt to mask  CPAP therapy. It is recommended that you sleep your sleep provider within the first 3 months and yearly after that if you are not having problems.   6. Use it for your health. We encourage use of CPAP masks during daytime quiet periods to allow your face and brain to adapt to the sensation of CPAP so that it will be a more natural sensation to awaken to at night or during naps. This can be very useful during the first few weeks or months of adapting to CPAP though it does not help medically to wear CPAP during wakefulness and  should not be used as a strategy just to meet guidelines.  7. Take care of your equipment. Make sure you clean your mask and tubing using directions every day and that your filter and mask are replaced as recommended or if they are not working.     BESIDES CPAP, WHAT OTHER THERAPIES ARE THERE?    Positioning Device  Positioning devices are generally used when sleep apnea is mild and only occurs on your back.This example shows a pillow that straps around the waist. It may be appropriate for those whose sleep study shows milder sleep apnea that occurs primarily when lying flat on one's back. Preliminary studies have shown benefit but effectiveness at home may need to be verified by a home sleep test. These devices are generally not covered by medical insurance.  Examples of devices that maintain sleeping on the back to prevent snoring and mild sleep apnea.    Belt type body positioner  http://Moka5.com.Powerlytics/    Electronic reminder  http://nightshifttherapy.com/  http://www.Cardio control.Powerlytics.au/      Oral Appliance  What is oral appliance therapy?  An oral appliance device fits on your teeth at night like a retainer used after having braces. The device is made by a specialized dentist and requires several visits over 1-2 months before a manufactured device is made to fit your teeth and is adjusted to prevent your sleep apnea. Once an oral device is working properly, snoring should be improved. A home  sleep test may be recommended at that time if to determine whether the sleep apnea is adequately treated.       Some things to remember:  -Oral devices are often, but not always, covered by your medical insurance. Be sure to check with your insurance provider.   -If you are referred for oral therapy, you will be given a list of specialized dentists to consider or you may choose to visit the Web site of the American Academy of Dental Sleep Medicine  -Oral devices are less likely to work if you have severe sleep apnea or are extremely overweight.     More detailed information  An oral appliance is a small acrylic device that fits over the upper and lower teeth  (similar to a retainer or a mouth guard). This device slightly moves jaw forward, which moves the base of the tongue forward, opens the airway, improves breathing for effective treat snoring and obstructive sleep apnea in perhaps 7 out of 10 people .  The best working devices are custom-made by a dental device  after a mold is made of the teeth 1, 2, 3.  When is an oral appliance indicated?  Oral appliance therapy is recommended as a first-line treatment for patients with primary snoring, mild sleep apnea, and for patients with moderate sleep apnea who prefer appliance therapy to use of CPAP4, 5. Severity of sleep apnea is determined by sleep testing and is based on the number of respiratory events per hour of sleep.   How successful is oral appliance therapy?  The success rate of oral appliance therapy in patients with mild sleep apnea is 75-80% while in patients with moderate sleep apnea it is 50-70%. The chance of success in patients with severe sleep apnea is 40-50%. The research also shows that oral appliances have a beneficial effect on the cardiovascular health of DAVID patients at the same magnitude as CPAP therapy7.  Oral appliances should be a second-line treatment in cases of severe sleep apnea, but if not completely successful then a  combination therapy utilizing CPAP plus oral appliance therapy may be effective. Oral appliances tend to be effective in a broad range of patients although studies show that the patients who have the highest success are females, younger patients, those with milder disease, and less severe obesity. 3, 6.   Finding a dentist that practices dental sleep medicine  Specific training is available through the American Academy of Dental Sleep Medicine for dentists interested in working in the field of sleep. To find a dentist who is educated in the field of sleep and the use of oral appliances, near you, visit the Web site of the American Academy of Dental Sleep Medicine.    References  1. Paul et al. Objectively measured vs self-reported compliance during oral appliance therapy for sleep-disordered breathing. Chest 2013; 144(5): 8387-5866.  2. Román et al. Objective measurement of compliance during oral appliance therapy for sleep-disordered breathing. Thorax 2013; 68(1): 91-96.  3. Pankaj, et al. Mandibular advancement devices in 620 men and women with DAVID and snoring: tolerability and predictors of treatment success. Chest 2004; 125: 8742-7325.  4. Renetta, et al. Oral appliances for snoring and DAVID: a review. Sleep 2006; 29: 244-262.  5. Norberto, et al. Oral appliance treatment for DAVID: an update. J Clin Sleep Med 2014; 10(2): 215-227.  6. Joni et al. Predictors of OSAH treatment outcome. J Dent Res 2007; 86: 5195-1135.      Weight Loss:    Weight loss is a long-term strategy that may improve sleep apnea in some patients.    Weight management is a personal decision and the decision should be based on your interest and the potential benefits.  If you are interested in exploring weight loss strategies, the following discussion covers the impact on weight loss on sleep apnea and the approaches that may be successful.    Being overweight does not necessarily mean you will have health consequences.   Those who have BMI over 35 or over 27 with existing medical conditions carries greater risk.   Weight loss decreases severity of sleep apnea in most people with obesity. For those with mild obesity who have developed snoring with weight gain, even 15-30 pound weight loss can improve and occasionally eliminate sleep apnea.  Structured and life-long dietary and health habits are necessary to lose weight and keep healthier weight levels.     Though there may be significant health benefits from weight loss, long-term weight loss is very difficult to achieve- studies show success with dietary management in less than 10% of people. In addition, substantial weight loss may require years of dietary control and may be difficult if patients have severe obesity. In these cases, surgical management may be considered.  Finally, older individuals who have tolerated obesity without health complications may be less likely to benefit from weight loss strategies.        Your BMI is Body mass index is 32.28 kg/m .  Weight management is a personal decision.  If you are interested in exploring weight loss strategies, the following discussion covers the approaches that may be successful. Body mass index (BMI) is one way to tell whether you are at a healthy weight, overweight, or obese. It measures your weight in relation to your height.  A BMI of 18.5 to 24.9 is in the healthy range. A person with a BMI of 25 to 29.9 is considered overweight, and someone with a BMI of 30 or greater is considered obese. More than two-thirds of American adults are considered overweight or obese.  Being overweight or obese increases the risk for further weight gain. Excess weight may lead to heart disease and diabetes.  Creating and following plans for healthy eating and physical activity may help you improve your health.  Weight control is part of healthy lifestyle and includes exercise, emotional health, and healthy eating habits. Careful eating habits  lifelong are the mainstay of weight control. Though there are significant health benefits from weight loss, long-term weight loss with diet alone may be very difficult to achieve- studies show long-term success with dietary management in less than 10% of people. Attaining a healthy weight may be especially difficult to achieve in those with severe obesity. In some cases, medications, devices and surgical management might be considered.  What can you do?  If you are overweight or obese and are interested in methods for weight loss, you should discuss this with your provider.     Consider reducing daily calorie intake by 500 calories.     Keep a food journal.     Avoiding skipping meals, consider cutting portions instead.    Diet combined with exercise helps maintain muscle while optimizing fat loss. Strength training is particularly important for building and maintaining muscle mass. Exercise helps reduce stress, increase energy, and improves fitness. Increasing exercise without diet control, however, may not burn enough calories to loose weight.       Start walking three days a week 10-20 minutes at a time    Work towards walking thirty minutes five days a week     Eventually, increase the speed of your walking for 1-2 minutes at time    In addition, we recommend that you review healthy lifestyles and methods for weight loss available through the National Institutes of Health patient information sites:  http://win.niddk.nih.gov/publications/index.htm    And look into health and wellness programs that may be available through your health insurance provider, employer, local community center, or tonya club.    Surgery:    Surgery for obstructive sleep apnea is considered generally only when other therapies fail to work. Surgery may be discussed with you if you are having a difficult time tolerating CPAP and or when there is an abnormal structure that requires surgical correction.  Nose and throat surgeries often  enlarge the airway to prevent collapse.  Most of these surgeries create pain for 1-2 weeks and up to half of the most common surgeries are not effective throughout life.  You should carefully discuss the benefits and drawbacks to surgery with your sleep provider and surgeon to determine if it is the best solution for you.   More information  Surgery for DAVID is directed at areas that are responsible for narrowing or complete obstruction of the airway during sleep.  There are a wide range of procedures available to enlarge and/or stabilize the airway to prevent blockage of breathing in the three major areas where it can occur: the palate, tongue, and nasal regions.  Successful surgical treatment depends on the accurate identification of the factors responsible for obstructive sleep apnea in each person.  A personalized approach is required because there is no single treatment that works well for everyone.  Because of anatomic variation, consultation with an examination by a sleep surgeon is a critical first step in determining what surgical options are best for each patient.  In some cases, examination during sedation may be recommended in order to guide the selection of procedures.  Patients will be counseled about risks and benefits as well as the typical recovery course after surgery. Surgery is typically not a cure for a person s DAVID.  However, surgery will often significantly improve one s DAVID severity (termed  success rate ).  Even in the absence of a cure, surgery will decrease the cardiovascular risk associated with OSA7; improve overall quality of life8 (sleepiness, functionality, sleep quality, etc).      Palate Procedures:  Patients with DAVID often have narrowing of their airway in the region of their tonsils and uvula.  The goals of palate procedures are to widen the airway in this region as well as to help the tissues resist collapse.  Modern palate procedure techniques focus on tissue conservation and  soft tissue rearrangement, rather than tissue removal.  Often the uvula is preserved in this procedure. Residual sleep apnea is common in patient after pharyngoplasty with an average reduction in sleep apnea events of 33%2.      Tongue Procedures:  ExamWhile patients are awake, the muscles that surround the throat are active and keep this region open for breathing. These muscles relax during sleep, allowing the tongue and other structures to collapse and block breathing.  There are several different tongue procedures available.  Selection of a tongue base procedure depends on characteristics seen on physical exam.  Generally, procedures are aimed at removing bulky tissues in this area or preventing the back of the tongue from falling back during sleep.  Success rates for tongue surgery range from 50-62%3.    Hypoglossal Nerve Stimulation:  Hypoglossal nerve stimulation has recently received approval from the [place] Food and Drug Administration for the treatment of obstructive sleep apnea.  This is based on research showing that the system was safe and effective in treating sleep apnea6.  Results showed that the median AHI score decreased 68%, from 29.3 to 9.0. This therapy uses an implant system that senses breathing patterns and delivers mild stimulation to airway muscles, which keeps the airway open during sleep.  The system consists of three fully implanted components: a small generator (similar in size to a pacemaker), a breathing sensor, and a stimulation lead.  Using a small handheld remote, a patient turns the therapy on before bed and off upon awakening.    Candidates for this device must be greater than 22 years of age, have moderate to severe DAVID (AHI between 20-65), BMI less than 32, have tried CPAP/oral appliance without success, and have appropriate upper airway anatomy (determined by a sleep endoscopy performed by Dr. Genao).    Hypoglossal Nerve Stimulation Pathway:    The sleep surgeon s office  will work with the patient through the insurance prior-authorization process (including communications and appeals).    Nasal Procedures:  Nasal obstruction can interfere with nasal breathing during the day and night.  Studies have shown that relief of nasal obstruction can improve the ability of some patients to tolerate positive airway pressure therapy for obstructive sleep apnea1.  Treatment options include medications such as nasal saline, topical corticosteroid and antihistamine sprays, and oral medications such as antihistamines or decongestants. Non-surgical treatments can include external nasal dilators for selected patients. If these are not successful by themselves, surgery can improve the nasal airway either alone or in combination with these other options.      Combination Procedures:  Combination of surgical procedures and other treatments may be recommended, particularly if patients have more than one area of narrowing or persistent positional disease.  The success rate of combination surgery ranges from 66-80%2,3.    References  1. Stephen SHAIKH. The Role of the Nose in Snoring and Obstructive Sleep Apnoea: An Update.  Eur Arch Otorhinolaryngol. 2011; 268: 1365-73.  2.  Deon SM; Ashvin JA; Itzel JR; Pallanch JF; Kaylynn MB; Henrry SG; Elicia GARCIA. Surgical modifications of the upper airway for obstructive sleep apnea in adults: a systematic review and meta-analysis. SLEEP 2010;33(10):5147-9665. Lyubov FRANKLIN. Hypopharyngeal surgery in obstructive sleep apnea: an evidence-based medicine review.  Arch Otolaryngol Head Neck Surg. 2006 Feb;132(2):206-13.  3. Satya YH1, Meaghan Y, Kush TASHA. The efficacy of anatomically based multilevel surgery for obstructive sleep apnea. Otolaryngol Head Neck Surg. 2003 Oct;129(4):327-35.  4. Lyubov FRANKLIN, Goldberg A. Hypopharyngeal Surgery in Obstructive Sleep Apnea: An Evidence-Based Medicine Review. Arch Otolaryngol Head Neck Surg. 2006 Feb;132(2):206-13.  5. Sourav COTO et al.  Upper-Airway Stimulation for Obstructive Sleep Apnea.  N Engl J Med. 2014 Jan 9;370(2):139-49.  6. Neto Y et al. Increased Incidence of Cardiovascular Disease in Middle-aged Men with Obstructive Sleep Apnea. Am J Respir Crit Care Med; 2002 166: 159-165  7. Deneen MATHUR et al. Studying Life Effects and Effectiveness of Palatopharyngoplasty (SLEEP) study: Subjective Outcomes of Isolated Uvulopalatopharyngoplasty. Otolaryngol Head Neck Surg. 2011; 144: 623-631.

## 2021-04-01 ENCOUNTER — PATIENT OUTREACH (OUTPATIENT)
Dept: ONCOLOGY | Facility: CLINIC | Age: 66
End: 2021-04-01

## 2021-04-01 DIAGNOSIS — C34.91 ADENOCARCINOMA OF RIGHT LUNG (H): Primary | ICD-10-CM

## 2021-04-01 NOTE — PROGRESS NOTES
RN Care Coordination Note  Received call from CT imaging stating patient is coming for imaging tomorrow. On order Dr Leung asked for liver to be included. In order for liver to be included needs to have CT CAP order. New orders place, CT department notified.         Bernadette Garcias RN, BSN, OCN   RN Care Coordinator   [facility]

## 2021-04-02 ENCOUNTER — ANCILLARY PROCEDURE (OUTPATIENT)
Dept: CT IMAGING | Facility: CLINIC | Age: 66
End: 2021-04-02
Attending: INTERNAL MEDICINE
Payer: COMMERCIAL

## 2021-04-02 DIAGNOSIS — C34.91 ADENOCARCINOMA, LUNG, RIGHT (H): ICD-10-CM

## 2021-04-02 DIAGNOSIS — C34.91 ADENOCARCINOMA OF RIGHT LUNG (H): ICD-10-CM

## 2021-04-02 DIAGNOSIS — C34.90 ADENOCARCINOMA OF LUNG, UNSPECIFIED LATERALITY (H): ICD-10-CM

## 2021-04-02 LAB
ALBUMIN SERPL-MCNC: 3.5 G/DL (ref 3.4–5)
ALP SERPL-CCNC: 110 U/L (ref 40–150)
ALT SERPL W P-5'-P-CCNC: 16 U/L (ref 0–50)
ANION GAP SERPL CALCULATED.3IONS-SCNC: 4 MMOL/L (ref 3–14)
AST SERPL W P-5'-P-CCNC: 10 U/L (ref 0–45)
BASOPHILS # BLD AUTO: 0 10E9/L (ref 0–0.2)
BASOPHILS NFR BLD AUTO: 0.5 %
BILIRUB SERPL-MCNC: 0.4 MG/DL (ref 0.2–1.3)
BUN SERPL-MCNC: 8 MG/DL (ref 7–30)
CALCIUM SERPL-MCNC: 9.1 MG/DL (ref 8.5–10.1)
CHLORIDE SERPL-SCNC: 105 MMOL/L (ref 94–109)
CO2 SERPL-SCNC: 31 MMOL/L (ref 20–32)
CREAT SERPL-MCNC: 0.62 MG/DL (ref 0.52–1.04)
DIFFERENTIAL METHOD BLD: ABNORMAL
EOSINOPHIL # BLD AUTO: 0.1 10E9/L (ref 0–0.7)
EOSINOPHIL NFR BLD AUTO: 2.2 %
ERYTHROCYTE [DISTWIDTH] IN BLOOD BY AUTOMATED COUNT: 12.9 % (ref 10–15)
GFR SERPL CREATININE-BSD FRML MDRD: >90 ML/MIN/{1.73_M2}
GLUCOSE SERPL-MCNC: 135 MG/DL (ref 70–99)
HCT VFR BLD AUTO: 46 % (ref 35–47)
HGB BLD-MCNC: 14.2 G/DL (ref 11.7–15.7)
IMM GRANULOCYTES # BLD: 0 10E9/L (ref 0–0.4)
IMM GRANULOCYTES NFR BLD: 0.3 %
LYMPHOCYTES # BLD AUTO: 1.9 10E9/L (ref 0.8–5.3)
LYMPHOCYTES NFR BLD AUTO: 30 %
MCH RBC QN AUTO: 27.5 PG (ref 26.5–33)
MCHC RBC AUTO-ENTMCNC: 30.9 G/DL (ref 31.5–36.5)
MCV RBC AUTO: 89 FL (ref 78–100)
MONOCYTES # BLD AUTO: 0.6 10E9/L (ref 0–1.3)
MONOCYTES NFR BLD AUTO: 9.1 %
NEUTROPHILS # BLD AUTO: 3.6 10E9/L (ref 1.6–8.3)
NEUTROPHILS NFR BLD AUTO: 57.9 %
NRBC # BLD AUTO: 0 10*3/UL
NRBC BLD AUTO-RTO: 0 /100
PLATELET # BLD AUTO: 207 10E9/L (ref 150–450)
POTASSIUM SERPL-SCNC: 3.9 MMOL/L (ref 3.4–5.3)
PROT SERPL-MCNC: 7.4 G/DL (ref 6.8–8.8)
RBC # BLD AUTO: 5.16 10E12/L (ref 3.8–5.2)
SODIUM SERPL-SCNC: 139 MMOL/L (ref 133–144)
WBC # BLD AUTO: 6.3 10E9/L (ref 4–11)

## 2021-04-02 PROCEDURE — 85025 COMPLETE CBC W/AUTO DIFF WBC: CPT | Performed by: PATHOLOGY

## 2021-04-02 PROCEDURE — 80053 COMPREHEN METABOLIC PANEL: CPT | Performed by: PATHOLOGY

## 2021-04-02 PROCEDURE — 71260 CT THORAX DX C+: CPT | Performed by: RADIOLOGY

## 2021-04-02 PROCEDURE — 74177 CT ABD & PELVIS W/CONTRAST: CPT | Performed by: RADIOLOGY

## 2021-04-02 PROCEDURE — 36415 COLL VENOUS BLD VENIPUNCTURE: CPT | Performed by: PATHOLOGY

## 2021-04-02 RX ORDER — IOPAMIDOL 755 MG/ML
123 INJECTION, SOLUTION INTRAVASCULAR ONCE
Status: COMPLETED | OUTPATIENT
Start: 2021-04-02 | End: 2021-04-02

## 2021-04-02 RX ADMIN — IOPAMIDOL 123 ML: 755 INJECTION, SOLUTION INTRAVASCULAR at 09:53

## 2021-04-05 ENCOUNTER — VIRTUAL VISIT (OUTPATIENT)
Dept: ONCOLOGY | Facility: CLINIC | Age: 66
End: 2021-04-05
Attending: NURSE PRACTITIONER
Payer: COMMERCIAL

## 2021-04-05 DIAGNOSIS — J44.1 COPD EXACERBATION (H): ICD-10-CM

## 2021-04-05 DIAGNOSIS — J30.2 SEASONAL ALLERGIC RHINITIS, UNSPECIFIED TRIGGER: ICD-10-CM

## 2021-04-05 DIAGNOSIS — C34.91 ADENOCARCINOMA OF RIGHT LUNG (H): Primary | ICD-10-CM

## 2021-04-05 DIAGNOSIS — E11.9 TYPE 2 DIABETES MELLITUS WITHOUT COMPLICATION, WITHOUT LONG-TERM CURRENT USE OF INSULIN (H): ICD-10-CM

## 2021-04-05 PROCEDURE — 999N001193 HC VIDEO/TELEPHONE VISIT; NO CHARGE

## 2021-04-05 PROCEDURE — 99214 OFFICE O/P EST MOD 30 MIN: CPT | Mod: 95 | Performed by: NURSE PRACTITIONER

## 2021-04-05 RX ORDER — CETIRIZINE HYDROCHLORIDE 10 MG/1
10 TABLET ORAL DAILY
Qty: 30 TABLET | Refills: 0 | Status: SHIPPED | OUTPATIENT
Start: 2021-04-05 | End: 2021-04-29

## 2021-04-05 NOTE — PROGRESS NOTES
"Jenn is a 65 year old who is being evaluated via a billable telephone visit.      What phone number would you like to be contacted at? [phone]  How would you like to obtain your AVS? Mail a copy     Patient needs refills for Zyrtec.     Vitals - Patient Reported  Weight (Patient Reported): 90.7 kg (200 lb)  Height (Patient Reported): 157.5 cm (5' 2\")  BMI (Based on Pt Reported Ht/Wt): 36.58  Pain Score: No Pain (0)    Janet GAGNONLETA    Phone call duration: 20 minutes    Reason for Visit: seen in f/u of lung cancer    Oncology HPI:   CANCER TYPE: Lung adenocarcinoma  STAGE: IA2 [id number] poorly diff adeno RLL, then [id number] IA2 suspected NSCLC RUL, medically inoperable      ECOG PS: 1     Cancer Staging  No matching staging information was found for the patient.     PD-L1: N/A  Lung panel: N/A  NGS: N/A     SUMMARY  12/24/15            PET/CT  1/13/15              CT lung bx. Adenocarcinoma  2/8/16                R thoracotomy, RLL lobectomy (Dr. Cunha). Adenocarcinoma, 1.1 cm, assoicated with atypical adenomatous hyperplasia  10/18/19            CTA for shortness of breath. New 1.3 cm RUL nodule  11/4~6/19          PET/CT. 1.1 cm RUL hypermetabolic nodule (SUV 12.6)  12/26/19            Brain MRI negative for mets  1/14~1/28/20     SBRT to RUL nodule, 5000 cGy, every other day, 1000 cGy (Dr. Currie at [facility] [place]). No bx due to O2 dependence and too much risk    Interval history: Jenn is doing ok. Was hospitalized with COPD exacerbation this winter. Is now on home O2. She is off of prednisone and antibiotics. Breathing feels stable. Continues to use her nebs. Working with pulm.  -notes her seasonal allergies have caused some increased sinus congestion and cough. She used zyrtec with improvement and is requesting a refill of that.  She is living in an apartment complex that has a lot of smokers and has a mold issue. She moved out to live with her daughter while they search for a new place to live. She has stopped " smoking since living with her daughter.    Appetite comes and goes, but weight is stable. Eating about 2 meals a day. No bowel/bladder irregularity. Follows with Mitzi Nettles regularly for management of her chronic health conditions.    Has had the first of 2 planned covid vaccinations.     PAST MEDICAL HISTORY  Lung adenocarcinoma  DM2  HCV IA  COPD. O2 dependent since late 2018. PFT 11/26/19. FEV1 0.64 (30%), FVC 1.69 (63%), DLCOunc 9.8 (38%)   HTN  H/o ITP  H/o disk herniation  Ankle surgery  Median neuropathy R  H/o sessile colon polyps 2014, h/o adenomas before that. Colonoscopy 2/7/18 @ [facility] [place]. Sessile serated adenoma x 1, tubular adenoma x 3   H/o chronic LBP  Dyslipidemia  Cataract       Current Outpatient Medications   Medication Sig Dispense Refill     albuterol (PROAIR HFA/PROVENTIL HFA/VENTOLIN HFA) 108 (90 Base) MCG/ACT inhaler INHALE 1 OR 2 PUFFS BY MOUTH EVERY 4 HOURS AS NEEDED 1 Inhaler 10     albuterol (PROVENTIL) (2.5 MG/3ML) 0.083% neb solution Take 1 vial (2.5 mg) by nebulization every 6 hours as needed for shortness of breath / dyspnea or wheezing 90 mL 3     Alpha-Lipoic Acid 300 MG CAPS Take 300 mg by mouth daily       amLODIPine (NORVASC) 10 MG tablet Take 1 tablet (10 mg) by mouth daily 90 tablet 3     Aspirin (ASPIR-81 PO) Take  by mouth.       fluticasone-vilanterol (BREO ELLIPTA) 200-25 MCG/INH inhaler Inhale 1 puff into the lungs daily 1 Inhaler 0     gabapentin (NEURONTIN) 300 MG capsule Take 1 capsule (300 mg) by mouth At Bedtime 30 capsule 1     glipiZIDE (GLUCOTROL XL) 2.5 MG 24 hr tablet Take 1 tablet (2.5 mg) by mouth daily 60 tablet 3     guaiFENesin (ROBITUSSIN) 20 mg/mL SOLN solution Take 10 mLs by mouth every 4 hours as needed for cough 118 mL 0     ipratropium - albuterol 0.5 mg/2.5 mg/3 mL (DUONEB) 0.5-2.5 (3) MG/3ML neb solution Take 1 vial (3 mLs) by nebulization every 6 hours as needed for shortness of breath / dyspnea or wheezing 1080 mL 0     Lidocaine (LIDOCARE) 4 %  Patch Place 1 patch onto the skin every 24 hours To prevent lidocaine toxicity, patient should be patch free for 12 hrs daily. 7 patch 3     nicotine (NICODERM CQ) 14 MG/24HR 24 hr patch Place 1 patch onto the skin every 24 hours 84 patch 1     sodium chloride (NEBUSAL) 3 % neb solution Take 3 mLs by nebulization daily Use 1-2 times daily in combination with Duoneb and Aerobika to help with mucus clearance 250 mL 3     umeclidinium (INCRUSE ELLIPTA) 62.5 MCG/INH inhaler Inhale 1 puff into the lungs daily 1 each 3     vitamin D3 (CHOLECALCIFEROL) 50 mcg (2000 units) tablet Take by mouth daily Patient is unsure of dose but takes one tablet daily and things it is 50 mcg            Allergies   Allergen Reactions     Interferons Dermatitis     Penicillins Hives     Aspirin      325mg      Codeine      Colon Care           not currently breastfeeding.  Wt Readings from Last 4 Encounters:   03/30/21 90.7 kg (200 lb)   02/26/21 93.8 kg (206 lb 12.7 oz)   09/08/20 93.6 kg (206 lb 4.8 oz)   08/13/15 73.6 kg (162 lb 3.2 oz)     Phone visit, no exam. Voice appears strong. Alert, in NAD.    Labs: Results for JOHN POOLE (MRN [id number]) as of 4/5/2021 12:31   Ref. Range 4/2/2021 08:58   Sodium Latest Ref Range: 133 - 144 mmol/L 139   Potassium Latest Ref Range: 3.4 - 5.3 mmol/L 3.9   Chloride Latest Ref Range: 94 - 109 mmol/L 105   Carbon Dioxide Latest Ref Range: 20 - 32 mmol/L 31   Urea Nitrogen Latest Ref Range: 7 - 30 mg/dL 8   Creatinine Latest Ref Range: 0.52 - 1.04 mg/dL 0.62   GFR Estimate Latest Ref Range: >60 mL/min/1.73_m2 >90   GFR Estimate If Black Latest Ref Range: >60 mL/min/1.73_m2 >90   Calcium Latest Ref Range: 8.5 - 10.1 mg/dL 9.1   Anion Gap Latest Ref Range: 3 - 14 mmol/L 4   Albumin Latest Ref Range: 3.4 - 5.0 g/dL 3.5   Protein Total Latest Ref Range: 6.8 - 8.8 g/dL 7.4   Bilirubin Total Latest Ref Range: 0.2 - 1.3 mg/dL 0.4   Alkaline Phosphatase Latest Ref Range: 40 - 150 U/L 110   ALT Latest Ref Range:  0 - 50 U/L 16   AST Latest Ref Range: 0 - 45 U/L 10   Glucose Latest Ref Range: 70 - 99 mg/dL 135 (H)   WBC Latest Ref Range: 4.0 - 11.0 10e9/L 6.3   Hemoglobin Latest Ref Range: 11.7 - 15.7 g/dL 14.2   Hematocrit Latest Ref Range: 35.0 - 47.0 % 46.0   Platelet Count Latest Ref Range: 150 - 450 10e9/L 207   RBC Count Latest Ref Range: 3.8 - 5.2 10e12/L 5.16   MCV Latest Ref Range: 78 - 100 fl 89   MCH Latest Ref Range: 26.5 - 33.0 pg 27.5   MCHC Latest Ref Range: 31.5 - 36.5 g/dL 30.9 (L)   RDW Latest Ref Range: 10.0 - 15.0 % 12.9   Diff Method Unknown Automated Method   % Neutrophils Latest Units: % 57.9   % Lymphocytes Latest Units: % 30.0   % Monocytes Latest Units: % 9.1   % Eosinophils Latest Units: % 2.2   % Basophils Latest Units: % 0.5   % Immature Granulocytes Latest Units: % 0.3   Nucleated RBCs Latest Ref Range: 0 /100 0   Absolute Neutrophil Latest Ref Range: 1.6 - 8.3 10e9/L 3.6   Absolute Lymphocytes Latest Ref Range: 0.8 - 5.3 10e9/L 1.9   Absolute Monocytes Latest Ref Range: 0.0 - 1.3 10e9/L 0.6   Absolute Eosinophils Latest Ref Range: 0.0 - 0.7 10e9/L 0.1   Absolute Basophils Latest Ref Range: 0.0 - 0.2 10e9/L 0.0   Abs Immature Granulocytes Latest Ref Range: 0 - 0.4 10e9/L 0.0   Absolute Nucleated RBC Unknown 0.0       Imaging: CT CHEST/ABDOMEN/PELVIS WITH CONTRAST 4/2/2021 10:04 AM     CLINICAL HISTORY: Restage presumed lung cancer, status post SBRT in  Jan 2020. Please include liver. Adenocarcinoma of right lung (H).     TECHNIQUE: CT scan of the chest, abdomen, and pelvis was performed  following injection of IV contrast. Multiplanar reformats were  obtained. Dose reduction techniques were used.      CONTRAST: Isovue 370 123 mL     COMPARISON: 12/15/2020, 9/8/2020     FINDINGS:   LUNGS AND PLEURA: The lungs are emphysematous. Right lower lobectomy.  Spiculated abnormality in the posterior aspect of the right upper lobe  contains a solid nodular density that measures 7 mm (series 6, image  106),  similar to prior studies. No new or enlarging pulmonary nodules.     MEDIASTINUM/AXILLAE: Thyroid is unremarkable. No thoracic or axillary  adenopathy. Mild coronary calcifications.     HEPATOBILIARY: Normal.     PANCREAS: Normal.     SPLEEN: Normal.     ADRENAL GLANDS: Normal.     KIDNEYS/BLADDER: Normal.     BOWEL: Colonic diverticulosis without evidence of acute  diverticulitis. No bowel obstruction or free air. Small hiatal hernia.     PELVIC ORGANS: Unremarkable.     ADDITIONAL FINDINGS: None.     MUSCULOSKELETAL: Normal.                                                                      IMPRESSION:  1.  Mixed interstitial and part solid abnormality in the right upper  lobe with central 7 mm nodule is unchanged from prior.  2.  CT of the chest, abdomen, and pelvis is unchanged compared to  recent prior studies.     MOMO RODRIGEZ MD    Impression/plan:   Adenocarcinoma of the lung, s/p lobectomy in 2015, presumed recurrent RUL s/p SBRT January 2020  -reviewed imaging and labs with her, no evidence of progression/recurrence  -recommended f/u for surveillance in 3-4 months. Since we have the full abd/pelvis on today's imaging will only do CT chest w/ contrast with her next visit    Severe COPD, following with pulm  -now on home O2 after recent exacerbation  -quit smoking!  -continue nebs    Seasonal allergy/rhinorrhea  -filled zyrtec for her. Encouraged her to discuss with Shiva if symptoms persist      Follow with ARCHIE Nettles CNP for management of her other chronic health conditions     35 minutes spent on the date of the encounter doing chart review, review of test results, interpretation of tests, patient visit and documentation

## 2021-04-05 NOTE — LETTER
4/5/2021         RE: Jenn Aparicio  [address] [place] [facility] [address]        Dear Colleague,    Thank you for referring your patient, Jenn Aparicio, to the [facility]. Please see a copy of my visit note below.    Reason for Visit: seen in f/u of lung cancer    Oncology HPI:   CANCER TYPE: Lung adenocarcinoma  STAGE: IA2 [id number] poorly diff adeno RLL, then [id number] IA2 suspected NSCLC RUL, medically inoperable      ECOG PS: 1     Cancer Staging  No matching staging information was found for the patient.     PD-L1: N/A  Lung panel: N/A  NGS: N/A     SUMMARY  12/24/15            PET/CT  1/13/15              CT lung bx. Adenocarcinoma  2/8/16                R thoracotomy, RLL lobectomy (Dr. Cunha). Adenocarcinoma, 1.1 cm, assoicated with atypical adenomatous hyperplasia  10/18/19            CTA for shortness of breath. New 1.3 cm RUL nodule  11/4~6/19          PET/CT. 1.1 cm RUL hypermetabolic nodule (SUV 12.6)  12/26/19            Brain MRI negative for mets  1/14~1/28/20     SBRT to RUL nodule, 5000 cGy, every other day, 1000 cGy (Dr. Currie at [facility] [place]). No bx due to O2 dependence and too much risk    Interval history: Jenn is doing ok. Was hospitalized with COPD exacerbation this winter. Is now on home O2. She is off of prednisone and antibiotics. Breathing feels stable. Continues to use her nebs. Working with pulm.  -notes her seasonal allergies have caused some increased sinus congestion and cough. She used zyrtec with improvement and is requesting a refill of that.  She is living in an apartment complex that has a lot of smokers and has a mold issue. She moved out to live with her daughter while they search for a new place to live. She has stopped smoking since living with her daughter.    Appetite comes and goes, but weight is stable. Eating about 2 meals a day. No bowel/bladder irregularity. Follows with Mitzi Nettles regularly for management of her chronic health  conditions.    Has had the first of 2 planned covid vaccinations.     PAST MEDICAL HISTORY  Lung adenocarcinoma  DM2  HCV IA  COPD. O2 dependent since late 2018. PFT 11/26/19. FEV1 0.64 (30%), FVC 1.69 (63%), DLCOunc 9.8 (38%)   HTN  H/o ITP  H/o disk herniation  Ankle surgery  Median neuropathy R  H/o sessile colon polyps 2014, h/o adenomas before that. Colonoscopy 2/7/18 @ [facility] [place]. Sessile serated adenoma x 1, tubular adenoma x 3   H/o chronic LBP  Dyslipidemia  Cataract       Current Outpatient Medications   Medication Sig Dispense Refill     albuterol (PROAIR HFA/PROVENTIL HFA/VENTOLIN HFA) 108 (90 Base) MCG/ACT inhaler INHALE 1 OR 2 PUFFS BY MOUTH EVERY 4 HOURS AS NEEDED 1 Inhaler 10     albuterol (PROVENTIL) (2.5 MG/3ML) 0.083% neb solution Take 1 vial (2.5 mg) by nebulization every 6 hours as needed for shortness of breath / dyspnea or wheezing 90 mL 3     Alpha-Lipoic Acid 300 MG CAPS Take 300 mg by mouth daily       amLODIPine (NORVASC) 10 MG tablet Take 1 tablet (10 mg) by mouth daily 90 tablet 3     Aspirin (ASPIR-81 PO) Take  by mouth.       fluticasone-vilanterol (BREO ELLIPTA) 200-25 MCG/INH inhaler Inhale 1 puff into the lungs daily 1 Inhaler 0     gabapentin (NEURONTIN) 300 MG capsule Take 1 capsule (300 mg) by mouth At Bedtime 30 capsule 1     glipiZIDE (GLUCOTROL XL) 2.5 MG 24 hr tablet Take 1 tablet (2.5 mg) by mouth daily 60 tablet 3     guaiFENesin (ROBITUSSIN) 20 mg/mL SOLN solution Take 10 mLs by mouth every 4 hours as needed for cough 118 mL 0     ipratropium - albuterol 0.5 mg/2.5 mg/3 mL (DUONEB) 0.5-2.5 (3) MG/3ML neb solution Take 1 vial (3 mLs) by nebulization every 6 hours as needed for shortness of breath / dyspnea or wheezing 1080 mL 0     Lidocaine (LIDOCARE) 4 % Patch Place 1 patch onto the skin every 24 hours To prevent lidocaine toxicity, patient should be patch free for 12 hrs daily. 7 patch 3     nicotine (NICODERM CQ) 14 MG/24HR 24 hr patch Place 1 patch onto the skin every 24  hours 84 patch 1     sodium chloride (NEBUSAL) 3 % neb solution Take 3 mLs by nebulization daily Use 1-2 times daily in combination with Duoneb and Aerobika to help with mucus clearance 250 mL 3     umeclidinium (INCRUSE ELLIPTA) 62.5 MCG/INH inhaler Inhale 1 puff into the lungs daily 1 each 3     vitamin D3 (CHOLECALCIFEROL) 50 mcg (2000 units) tablet Take by mouth daily Patient is unsure of dose but takes one tablet daily and things it is 50 mcg            Allergies   Allergen Reactions     Interferons Dermatitis     Penicillins Hives     Aspirin      325mg      Codeine      Colon Care           not currently breastfeeding.  Wt Readings from Last 4 Encounters:   03/30/21 90.7 kg (200 lb)   02/26/21 93.8 kg (206 lb 12.7 oz)   09/08/20 93.6 kg (206 lb 4.8 oz)   08/13/15 73.6 kg (162 lb 3.2 oz)     Phone visit, no exam. Voice appears strong. Alert, in NAD.    Labs: Results for JOHN POOLE (MRN [id number]) as of 4/5/2021 12:31   Ref. Range 4/2/2021 08:58   Sodium Latest Ref Range: 133 - 144 mmol/L 139   Potassium Latest Ref Range: 3.4 - 5.3 mmol/L 3.9   Chloride Latest Ref Range: 94 - 109 mmol/L 105   Carbon Dioxide Latest Ref Range: 20 - 32 mmol/L 31   Urea Nitrogen Latest Ref Range: 7 - 30 mg/dL 8   Creatinine Latest Ref Range: 0.52 - 1.04 mg/dL 0.62   GFR Estimate Latest Ref Range: >60 mL/min/1.73_m2 >90   GFR Estimate If Black Latest Ref Range: >60 mL/min/1.73_m2 >90   Calcium Latest Ref Range: 8.5 - 10.1 mg/dL 9.1   Anion Gap Latest Ref Range: 3 - 14 mmol/L 4   Albumin Latest Ref Range: 3.4 - 5.0 g/dL 3.5   Protein Total Latest Ref Range: 6.8 - 8.8 g/dL 7.4   Bilirubin Total Latest Ref Range: 0.2 - 1.3 mg/dL 0.4   Alkaline Phosphatase Latest Ref Range: 40 - 150 U/L 110   ALT Latest Ref Range: 0 - 50 U/L 16   AST Latest Ref Range: 0 - 45 U/L 10   Glucose Latest Ref Range: 70 - 99 mg/dL 135 (H)   WBC Latest Ref Range: 4.0 - 11.0 10e9/L 6.3   Hemoglobin Latest Ref Range: 11.7 - 15.7 g/dL 14.2   Hematocrit Latest  Ref Range: 35.0 - 47.0 % 46.0   Platelet Count Latest Ref Range: 150 - 450 10e9/L 207   RBC Count Latest Ref Range: 3.8 - 5.2 10e12/L 5.16   MCV Latest Ref Range: 78 - 100 fl 89   MCH Latest Ref Range: 26.5 - 33.0 pg 27.5   MCHC Latest Ref Range: 31.5 - 36.5 g/dL 30.9 (L)   RDW Latest Ref Range: 10.0 - 15.0 % 12.9   Diff Method Unknown Automated Method   % Neutrophils Latest Units: % 57.9   % Lymphocytes Latest Units: % 30.0   % Monocytes Latest Units: % 9.1   % Eosinophils Latest Units: % 2.2   % Basophils Latest Units: % 0.5   % Immature Granulocytes Latest Units: % 0.3   Nucleated RBCs Latest Ref Range: 0 /100 0   Absolute Neutrophil Latest Ref Range: 1.6 - 8.3 10e9/L 3.6   Absolute Lymphocytes Latest Ref Range: 0.8 - 5.3 10e9/L 1.9   Absolute Monocytes Latest Ref Range: 0.0 - 1.3 10e9/L 0.6   Absolute Eosinophils Latest Ref Range: 0.0 - 0.7 10e9/L 0.1   Absolute Basophils Latest Ref Range: 0.0 - 0.2 10e9/L 0.0   Abs Immature Granulocytes Latest Ref Range: 0 - 0.4 10e9/L 0.0   Absolute Nucleated RBC Unknown 0.0       Imaging: CT CHEST/ABDOMEN/PELVIS WITH CONTRAST 4/2/2021 10:04 AM     CLINICAL HISTORY: Restage presumed lung cancer, status post SBRT in  Jan 2020. Please include liver. Adenocarcinoma of right lung (H).     TECHNIQUE: CT scan of the chest, abdomen, and pelvis was performed  following injection of IV contrast. Multiplanar reformats were  obtained. Dose reduction techniques were used.      CONTRAST: Isovue 370 123 mL     COMPARISON: 12/15/2020, 9/8/2020     FINDINGS:   LUNGS AND PLEURA: The lungs are emphysematous. Right lower lobectomy.  Spiculated abnormality in the posterior aspect of the right upper lobe  contains a solid nodular density that measures 7 mm (series 6, image  106), similar to prior studies. No new or enlarging pulmonary nodules.     MEDIASTINUM/AXILLAE: Thyroid is unremarkable. No thoracic or axillary  adenopathy. Mild coronary calcifications.     HEPATOBILIARY:  Normal.     PANCREAS: Normal.     SPLEEN: Normal.     ADRENAL GLANDS: Normal.     KIDNEYS/BLADDER: Normal.     BOWEL: Colonic diverticulosis without evidence of acute  diverticulitis. No bowel obstruction or free air. Small hiatal hernia.     PELVIC ORGANS: Unremarkable.     ADDITIONAL FINDINGS: None.     MUSCULOSKELETAL: Normal.                                                                      IMPRESSION:  1.  Mixed interstitial and part solid abnormality in the right upper  lobe with central 7 mm nodule is unchanged from prior.  2.  CT of the chest, abdomen, and pelvis is unchanged compared to  recent prior studies.     MOMO RODRIGEZ MD    Impression/plan:   Adenocarcinoma of the lung, s/p lobectomy in 2015, presumed recurrent RUL s/p SBRT January 2020  -reviewed imaging and labs with her, no evidence of progression/recurrence  -recommended f/u for surveillance in 3-4 months. Since we have the full abd/pelvis on today's imaging will only do CT chest w/ contrast with her next visit    Severe COPD, following with pulm  -now on home O2 after recent exacerbation  -quit smoking!  -continue nebs    Seasonal allergy/rhinorrhea  -filled zyrtec for her. Encouraged her to discuss with Shiva if symptoms persist      Follow with ARCHIE Nettles CNP for management of her other chronic health conditions     35 minutes spent on the date of the encounter doing chart review, review of test results, interpretation of tests, patient visit and documentation         Again, thank you for allowing me to participate in the care of your patient.      Sincerely,    BENOIT Jimenes CNP

## 2021-04-08 DIAGNOSIS — R07.89 OTHER CHEST PAIN: ICD-10-CM

## 2021-04-09 ENCOUNTER — MEDICAL CORRESPONDENCE (OUTPATIENT)
Dept: HEALTH INFORMATION MANAGEMENT | Facility: CLINIC | Age: 66
End: 2021-04-09

## 2021-04-12 NOTE — TELEPHONE ENCOUNTER
GABAPENTIN 300 MG CAPSULE    Last Written Prescription Date:  3/2/21  Last Fill Quantity: 30,   # refills: 1  Last Office Visit : 3/2/21  Future Office visit:  none    Routing refill request to provider for review/approval because:  Per protocol  Thank-you, Sana SOTO RN Medication Refill Team

## 2021-04-13 RX ORDER — GABAPENTIN 300 MG/1
300 CAPSULE ORAL AT BEDTIME
Qty: 30 CAPSULE | Refills: 1 | OUTPATIENT
Start: 2021-04-13

## 2021-04-19 ENCOUNTER — TELEPHONE (OUTPATIENT)
Dept: SLEEP MEDICINE | Facility: CLINIC | Age: 66
End: 2021-04-19

## 2021-04-19 NOTE — TELEPHONE ENCOUNTER
I called Jenn to see if she wanted to come in tonight 4/19/2021 for PSG as she was a no show no call the night before.  She is not able. She is not feeling well and is not sure if she should be tested for COVID.

## 2021-04-27 ENCOUNTER — TELEPHONE (OUTPATIENT)
Dept: PULMONOLOGY | Facility: CLINIC | Age: 66
End: 2021-04-27

## 2021-04-27 NOTE — TELEPHONE ENCOUNTER
Spoke with pt about scheduling a visit with Dr. Spann as records indicate she is almost due. appt was scheduled via telephone per pt preference and details confirmed with pt.

## 2021-04-28 ENCOUNTER — VIRTUAL VISIT (OUTPATIENT)
Dept: PULMONOLOGY | Facility: CLINIC | Age: 66
End: 2021-04-28
Attending: INTERNAL MEDICINE
Payer: COMMERCIAL

## 2021-04-28 DIAGNOSIS — J42 CHRONIC BRONCHITIS, UNSPECIFIED CHRONIC BRONCHITIS TYPE (H): ICD-10-CM

## 2021-04-28 DIAGNOSIS — J44.1 CHRONIC OBSTRUCTIVE PULMONARY DISEASE WITH ACUTE EXACERBATION (H): Primary | ICD-10-CM

## 2021-04-28 DIAGNOSIS — J30.2 SEASONAL ALLERGIC RHINITIS, UNSPECIFIED TRIGGER: ICD-10-CM

## 2021-04-28 DIAGNOSIS — C34.90 ADENOCARCINOMA OF LUNG, UNSPECIFIED LATERALITY (H): ICD-10-CM

## 2021-04-28 DIAGNOSIS — C34.91 ADENOCARCINOMA OF RIGHT LUNG (H): ICD-10-CM

## 2021-04-28 PROCEDURE — 99214 OFFICE O/P EST MOD 30 MIN: CPT | Mod: 95 | Performed by: STUDENT IN AN ORGANIZED HEALTH CARE EDUCATION/TRAINING PROGRAM

## 2021-04-28 NOTE — PROGRESS NOTES
.Jenn is a 65 year old who is being evaluated via a billable telephone visit.      What phone number would you like to be contacted at? [phone]  How would you like to obtain your AVS? Mail a copy  Phone call duration: 35 minutes    Jenn Aparicio is a 66 yo female with PMH of Very Severe COPD (FEV1 24%) on 2L O2, RLL IA2 [id number] adenocarcinoma s/p lobectomy 2/2016 and suspected RUL [id number] IA2 NSCLC s/p SBRT 1/2020, Tobacco use, DMII, and HTN who presents for follow up of COPD.    Established with Pulmonary December 2020 after transferring Oncology care to [facility]. At time of first visit had been off ICS-LABA for a while but was still taking LAMA and albuterol. Care has focused on restarting full inhaler regimen, smoking cessation, and home oxygen options as struggling with current tanks. At last phone visit in mid February started airway clearance and recommended increasing oxygen as needed during exertion. Admitted to [facility] 2/23-26/21 for COPD exacerbation. Discharged to complete prednisone taper and levofloxacin course with referrals for Sleep and Rehab. Sleep evaluation 3/30/21, suspicion for sleep apnea with recommendation for in-lab sleep study     Today spoke with patient via telephone and she sounded improved from prior. When went into the hospital had a lot of issues with breathing and coughing. Still has a daily cough, sometimes with sputum production. Breathing remains difficult, especially with exertion or in the bathroom steam. Using 3L at baseline. Using Duoneb every 6 hours, sometimes every  4 hrs. Still has not started an ICS-LABA inhaler, is taking Incruse daily. Taking Zyrtec to help with allergy symptoms. Having some muscle cramp/ache like symptoms on sides and occasionally in legs.     Completed a course of home rehab, has some exercises to do. Has to take it day by day in terms of stamina. Smoking about 1-2 cigarettes per day. Still wanting to quit. Has patches and thinks has used Wellbutrin in  past, thinking will use it again but not now. Major focus right now is finding a new apartment to live in. Current apartment in a building with poor air quality, dust, significant secondhand smoke. Thinks getting away from the smell of smoke will also help with quitting. Was able to get a new air purifier. Still has same oxygen tanks, told that her company does not have concentrators.     Oncology History:   Taken from Oncology notes and chart review:  11/2015 - SOB, CT with medial RLL nodule  12/2015 - PET scan - Hypermetabolic RLL, no mets  1/2016 - Biopsy favoring adenocarcinoma  2/2016 - RLL lobectomy, pathology confirmed poorly differentiated adeno  10/2019 - Admission for acute respiratory failure 2/2 COPD exacerbation with new RUL nodule on CT  11/2019 PET - Hypermetabolic RUL nodule, no mets  12/2019 - Not surgical/biopsy candidate given emphysema, poor lung function  1/14/2020 - 1/28/2020 Empiric SBRT    Assessment and Recommendations  Chronic Hypoxic Respiratory Failure  Very Severe COPD  Significant obstructive disease with ongoing poor control due to lack of controller medications, active smoking, and environmental exposures. Still not on optimal inhaler therapy, unclear why ordered Breo inhaler did not come through. Needing duoneb throughout the day but hopefully dependence will decrease with full inhaler regimen. Will do symptomatic treatments for cough and allergies as well. Scheduled for further evaluation in Sleep clinic and will see if can continue monitored rehab through Pulmonary Rehab to provide activity, breathing instructions, and time out of the home. Agree that moving to new apartment is a priority as breathing negatively impacted by dust, humidity, and smoke within current building. Discussed that it is also very important to quit smoking and offered assistance, will continue to discuss at all visits.   - Continue current inhalers, will look into why has still not received ICS-LABA  -  Prescriptions for prednisone and doxycycline to have at home in case of exacerbations  - Tessalon to use PRN for cough symptoms  - Albuterol and Zyrtec refills  - Follow up in Sleep clinic  - Referral to Pulmonary Rehab for continued effort at increasing activity  - Agree with need for new apartment, can help with documentation if needed  - Continue to support efforts at smoking cessation, have offered additional nicotine replacement products if needed, will discuss possible MTM referral at next visit  - Has received Covid vaccine    Follow up in 3 months     Patient discussed with Dr. Mansoor Spann MD PhD  Pulmonary Critical Care Fellow  [phone]

## 2021-04-28 NOTE — LETTER
4/28/2021         RE: Jenn Aparicio  [address]  [place] [address]        Dear Colleague,    Thank you for referring your patient, Jenn Aparicio, to the [facility]G [facility] AND [facility] [place]. Please see a copy of my visit note below.    .Jenn is a 65 year old who is being evaluated via a billable telephone visit.      What phone number would you like to be contacted at? [phone]  How would you like to obtain your AVS? Mail a copy  Phone call duration: 35 minutes    Jenn Aparicio is a 64 yo female with PMH of Very Severe COPD (FEV1 24%) on 2L O2, RLL IA2 [id number] adenocarcinoma s/p lobectomy 2/2016 and suspected RUL [id number] IA2 NSCLC s/p SBRT 1/2020, Tobacco use, DMII, and HTN who presents for follow up of COPD.    Established with Pulmonary December 2020 after transferring Oncology care to [facility]. At time of first visit had been off ICS-LABA for a while but was still taking LAMA and albuterol. Care has focused on restarting full inhaler regimen, smoking cessation, and home oxygen options as struggling with current tanks. At last phone visit in mid February started airway clearance and recommended increasing oxygen as needed during exertion. Admitted to [facility] 2/23-26/21 for COPD exacerbation. Discharged to complete prednisone taper and levofloxacin course with referrals for Sleep and Rehab. Sleep evaluation 3/30/21, suspicion for sleep apnea with recommendation for in-lab sleep study     Today spoke with patient via telephone and she sounded improved from prior. When went into the hospital had a lot of issues with breathing and coughing. Still has a daily cough, sometimes with sputum production. Breathing remains difficult, especially with exertion or in the bathroom steam. Using 3L at baseline. Using Duoneb every 6 hours, sometimes every  4 hrs. Still has not started an ICS-LABA inhaler, is taking Incruse daily. Taking Zyrtec to help with allergy symptoms. Having some  muscle cramp/ache like symptoms on sides and occasionally in legs.     Completed a course of home rehab, has some exercises to do. Has to take it day by day in terms of stamina. Smoking about 1-2 cigarettes per day. Still wanting to quit. Has patches and thinks has used Wellbutrin in past, thinking will use it again but not now. Major focus right now is finding a new apartment to live in. Current apartment in a building with poor air quality, dust, significant secondhand smoke. Thinks getting away from the smell of smoke will also help with quitting. Was able to get a new air purifier. Still has same oxygen tanks, told that her company does not have concentrators.     Oncology History:   Taken from Oncology notes and chart review:  11/2015 - SOB, CT with medial RLL nodule  12/2015 - PET scan - Hypermetabolic RLL, no mets  1/2016 - Biopsy favoring adenocarcinoma  2/2016 - RLL lobectomy, pathology confirmed poorly differentiated adeno  10/2019 - Admission for acute respiratory failure 2/2 COPD exacerbation with new RUL nodule on CT  11/2019 PET - Hypermetabolic RUL nodule, no mets  12/2019 - Not surgical/biopsy candidate given emphysema, poor lung function  1/14/2020 - 1/28/2020 Empiric SBRT    Assessment and Recommendations  Chronic Hypoxic Respiratory Failure  Very Severe COPD  Significant obstructive disease with ongoing poor control due to lack of controller medications, active smoking, and environmental exposures. Still not on optimal inhaler therapy, unclear why ordered Breo inhaler did not come through. Needing duoneb throughout the day but hopefully dependence will decrease with full inhaler regimen. Will do symptomatic treatments for cough and allergies as well. Scheduled for further evaluation in Sleep clinic and will see if can continue monitored rehab through Pulmonary Rehab to provide activity, breathing instructions, and time out of the home. Agree that moving to new apartment is a priority as breathing  negatively impacted by dust, humidity, and smoke within current building. Discussed that it is also very important to quit smoking and offered assistance, will continue to discuss at all visits.   - Continue current inhalers, will look into why has still not received ICS-LABA  - Prescriptions for prednisone and doxycycline to have at home in case of exacerbations  - Tessalon to use PRN for cough symptoms  - Albuterol and Zyrtec refills  - Follow up in Sleep clinic  - Referral to Pulmonary Rehab for continued effort at increasing activity  - Agree with need for new apartment, can help with documentation if needed  - Continue to support efforts at smoking cessation, have offered additional nicotine replacement products if needed, will discuss possible MTM referral at next visit  - Has received Covid vaccine    Follow up in 3 months     Patient discussed with Dr. Mansoor Spann MD PhD  Pulmonary Critical Care Fellow  [phone]    Attestation signed by Latesha Max MD at 5/4/2021  1:54 PM:  Pulmonary Attending Staff:  I have discussed Ms. Aparicio's case with Dr. Spann; reviewed the patient's available radiographic imaging and PFT data and met with her via video visit.  I agree with the findings, assessment and recommendations as outlined below by Dr. Spann.        Latesha Max MD  #[id number]  Patient seen 4/28/2021

## 2021-04-29 ENCOUNTER — TELEPHONE (OUTPATIENT)
Dept: PULMONOLOGY | Facility: CLINIC | Age: 66
End: 2021-04-29

## 2021-04-29 DIAGNOSIS — J44.9 COPD (CHRONIC OBSTRUCTIVE PULMONARY DISEASE) (H): Primary | ICD-10-CM

## 2021-04-29 RX ORDER — CETIRIZINE HYDROCHLORIDE 10 MG/1
10 TABLET ORAL DAILY
Qty: 30 TABLET | Refills: 0 | Status: SHIPPED | OUTPATIENT
Start: 2021-04-29 | End: 2021-06-07

## 2021-04-29 RX ORDER — DOXYCYCLINE 100 MG/1
CAPSULE ORAL
Qty: 10 CAPSULE | Refills: 0 | Status: SHIPPED | OUTPATIENT
Start: 2021-04-29 | End: 2021-05-17

## 2021-04-29 RX ORDER — BENZONATATE 200 MG/1
200 CAPSULE ORAL 3 TIMES DAILY PRN
Qty: 100 CAPSULE | Refills: 3 | Status: SHIPPED | OUTPATIENT
Start: 2021-04-29 | End: 2022-04-20

## 2021-04-29 RX ORDER — PREDNISONE 20 MG/1
TABLET ORAL
Qty: 10 TABLET | Refills: 0 | Status: SHIPPED | OUTPATIENT
Start: 2021-04-29 | End: 2021-05-17

## 2021-04-29 RX ORDER — ALBUTEROL SULFATE 90 UG/1
AEROSOL, METERED RESPIRATORY (INHALATION)
Qty: 18 G | Refills: 3 | Status: SHIPPED | OUTPATIENT
Start: 2021-04-29 | End: 2021-06-08

## 2021-04-29 NOTE — TELEPHONE ENCOUNTER
Spoke with patient regarding medications and pulmonary rehab. RN sent Breo prescription to [facility], and she should receive it as soon as it is processed. Patient would like to use [facility] in [place] for pulmonary rehab. RN is awaiting completion of note and for it to be signed.

## 2021-05-04 NOTE — PATIENT INSTRUCTIONS
Continue using your Incruse inhaler daily, starting using Breo inhaler daily once you get it.     Can continue to use albuterol and Duonebs as needed for acute symptoms.     Continue to take Zyrtec as needed for allergy symptoms, and can take Tessalon for cough symptoms    If you start to have significant difficulties with cough or breathing (like before you went into the hospital in February) start taking the doxycycline and prednisone and call the Pulmonary clinic    Clinic will call to talk about Pulmonary Rehab    Follow up with Sleep clinic as scheduled    Keep working on cutting back on the cigarettes

## 2021-05-05 ENCOUNTER — TELEPHONE (OUTPATIENT)
Dept: PULMONOLOGY | Facility: CLINIC | Age: 66
End: 2021-05-05

## 2021-05-10 DIAGNOSIS — J44.1 CHRONIC OBSTRUCTIVE PULMONARY DISEASE WITH ACUTE EXACERBATION (H): ICD-10-CM

## 2021-05-12 RX ORDER — LIDOCAINE 4 G/G
PATCH TOPICAL EVERY 24 HOURS
Qty: 7 PATCH | Refills: 11 | Status: SHIPPED | OUTPATIENT
Start: 2021-05-12 | End: 2022-03-30

## 2021-05-17 ENCOUNTER — TELEPHONE (OUTPATIENT)
Dept: PULMONOLOGY | Facility: CLINIC | Age: 66
End: 2021-05-17

## 2021-05-17 DIAGNOSIS — J44.1 CHRONIC OBSTRUCTIVE PULMONARY DISEASE WITH ACUTE EXACERBATION (H): ICD-10-CM

## 2021-05-17 RX ORDER — PREDNISONE 20 MG/1
TABLET ORAL
Qty: 10 TABLET | Refills: 0 | Status: ON HOLD | OUTPATIENT
Start: 2021-05-17 | End: 2022-05-24

## 2021-05-17 RX ORDER — DOXYCYCLINE 100 MG/1
CAPSULE ORAL
Qty: 10 CAPSULE | Refills: 0 | Status: SHIPPED | OUTPATIENT
Start: 2021-05-17 | End: 2022-05-24

## 2021-05-17 NOTE — TELEPHONE ENCOUNTER
[facility] Call Center    Phone Message    May a detailed message be left on voicemail: yes     Reason for Call: Other: Pt called in stating Dr. Spann instructed her to call in and let us know when she started two different medications, a steroid and an antibiotic(Pt could not remember names). She started them on Saturday and is feeling much better. She is also wanting refills for both of these. Please call her back to discuss.      Action Taken: Message routed to:  [facility] ([facility]): Pulm    Travel Screening: Not Applicable

## 2021-05-17 NOTE — TELEPHONE ENCOUNTER
Sent doxycycline and prednisone refills to pharmacy, notified patient, and sent information to provider to review.

## 2021-05-28 DIAGNOSIS — R07.89 OTHER CHEST PAIN: ICD-10-CM

## 2021-06-02 RX ORDER — GABAPENTIN 300 MG/1
CAPSULE ORAL
Qty: 90 CAPSULE | Refills: 1 | Status: SHIPPED | OUTPATIENT
Start: 2021-06-02 | End: 2021-10-27

## 2021-06-02 NOTE — TELEPHONE ENCOUNTER
GABAPENTIN 300 MG CAPSULE      Last Written Prescription Date:  3-2-21  Last Fill Quantity: 30,   # refills: 1  Last Office Visit : 3-2-21  Future Office visit:  none    Routing refill request to provider for review/approval because:  Drug not on the [facility], P or [facility] refill protocol or controlled substance

## 2021-06-07 DIAGNOSIS — J30.2 SEASONAL ALLERGIC RHINITIS, UNSPECIFIED TRIGGER: ICD-10-CM

## 2021-06-07 DIAGNOSIS — C34.91 ADENOCARCINOMA OF RIGHT LUNG (H): ICD-10-CM

## 2021-06-07 RX ORDER — CETIRIZINE HYDROCHLORIDE 10 MG/1
10 TABLET ORAL DAILY
Qty: 30 TABLET | Refills: 10 | Status: SHIPPED | OUTPATIENT
Start: 2021-06-07 | End: 2022-04-20

## 2021-06-08 DIAGNOSIS — C34.90 ADENOCARCINOMA OF LUNG, UNSPECIFIED LATERALITY (H): ICD-10-CM

## 2021-06-08 RX ORDER — ALBUTEROL SULFATE 90 UG/1
AEROSOL, METERED RESPIRATORY (INHALATION)
Qty: 18 G | Refills: 7 | Status: SHIPPED | OUTPATIENT
Start: 2021-06-08 | End: 2022-01-04

## 2021-06-22 ENCOUNTER — APPOINTMENT (OUTPATIENT)
Dept: GENERAL RADIOLOGY | Facility: CLINIC | Age: 66
DRG: 193 | End: 2021-06-22
Payer: COMMERCIAL

## 2021-06-22 ENCOUNTER — HOSPITAL ENCOUNTER (INPATIENT)
Facility: CLINIC | Age: 66
LOS: 3 days | Discharge: HOME HEALTH | DRG: 193 | End: 2021-06-25
Attending: EMERGENCY MEDICINE | Admitting: FAMILY MEDICINE
Payer: COMMERCIAL

## 2021-06-22 ENCOUNTER — APPOINTMENT (OUTPATIENT)
Dept: GENERAL RADIOLOGY | Facility: CLINIC | Age: 66
DRG: 193 | End: 2021-06-22
Attending: EMERGENCY MEDICINE
Payer: COMMERCIAL

## 2021-06-22 DIAGNOSIS — J44.1 COPD EXACERBATION (H): ICD-10-CM

## 2021-06-22 DIAGNOSIS — E46 MALNUTRITION, UNSPECIFIED TYPE (H): Primary | ICD-10-CM

## 2021-06-22 DIAGNOSIS — J18.9 PNEUMONIA OF LEFT LOWER LOBE DUE TO INFECTIOUS ORGANISM: ICD-10-CM

## 2021-06-22 DIAGNOSIS — F17.210 CIGARETTE SMOKER: ICD-10-CM

## 2021-06-22 DIAGNOSIS — J44.9 CHRONIC OBSTRUCTIVE PULMONARY DISEASE, UNSPECIFIED COPD TYPE (H): ICD-10-CM

## 2021-06-22 DIAGNOSIS — J44.0 CHRONIC OBSTRUCTIVE PULMONARY DISEASE WITH ACUTE LOWER RESP INFECTION (H): ICD-10-CM

## 2021-06-22 DIAGNOSIS — Z20.822 CONTACT WITH AND (SUSPECTED) EXPOSURE TO COVID-19: ICD-10-CM

## 2021-06-22 LAB
ANION GAP SERPL CALCULATED.3IONS-SCNC: 3 MMOL/L (ref 3–14)
BASOPHILS # BLD AUTO: 0.1 10E9/L (ref 0–0.2)
BASOPHILS NFR BLD AUTO: 0.5 %
BUN SERPL-MCNC: 11 MG/DL (ref 7–30)
C PNEUM DNA SPEC QL NAA+PROBE: NOT DETECTED
CALCIUM SERPL-MCNC: 8.9 MG/DL (ref 8.5–10.1)
CHLORIDE SERPL-SCNC: 100 MMOL/L (ref 94–109)
CO2 SERPL-SCNC: 31 MMOL/L (ref 20–32)
CREAT SERPL-MCNC: 0.49 MG/DL (ref 0.52–1.04)
CRP SERPL-MCNC: 4.1 MG/L (ref 0–8)
D DIMER PPP FEU-MCNC: 0.3 UG/ML FEU (ref 0–0.5)
DIFFERENTIAL METHOD BLD: ABNORMAL
EOSINOPHIL # BLD AUTO: 0.2 10E9/L (ref 0–0.7)
EOSINOPHIL NFR BLD AUTO: 2.3 %
ERYTHROCYTE [DISTWIDTH] IN BLOOD BY AUTOMATED COUNT: 12.8 % (ref 10–15)
FLUAV H1 2009 PAND RNA SPEC QL NAA+PROBE: NOT DETECTED
FLUAV H1 RNA SPEC QL NAA+PROBE: NOT DETECTED
FLUAV H3 RNA SPEC QL NAA+PROBE: NOT DETECTED
FLUAV RNA SPEC QL NAA+PROBE: NOT DETECTED
FLUBV RNA SPEC QL NAA+PROBE: NOT DETECTED
GFR SERPL CREATININE-BSD FRML MDRD: >90 ML/MIN/{1.73_M2}
GLUCOSE BLDC GLUCOMTR-MCNC: 161 MG/DL (ref 70–99)
GLUCOSE SERPL-MCNC: 156 MG/DL (ref 70–99)
HADV DNA SPEC QL NAA+PROBE: NOT DETECTED
HCOV PNL SPEC NAA+PROBE: NOT DETECTED
HCT VFR BLD AUTO: 48.5 % (ref 35–47)
HGB BLD-MCNC: 15.1 G/DL (ref 11.7–15.7)
HMPV RNA SPEC QL NAA+PROBE: NOT DETECTED
HPIV1 RNA SPEC QL NAA+PROBE: NOT DETECTED
HPIV2 RNA SPEC QL NAA+PROBE: NOT DETECTED
HPIV3 RNA SPEC QL NAA+PROBE: NOT DETECTED
HPIV4 RNA SPEC QL NAA+PROBE: NOT DETECTED
IMM GRANULOCYTES # BLD: 0 10E9/L (ref 0–0.4)
IMM GRANULOCYTES NFR BLD: 0.3 %
LABORATORY COMMENT REPORT: NORMAL
LYMPHOCYTES # BLD AUTO: 1.8 10E9/L (ref 0.8–5.3)
LYMPHOCYTES NFR BLD AUTO: 18.5 %
M PNEUMO DNA SPEC QL NAA+PROBE: NOT DETECTED
MCH RBC QN AUTO: 28.3 PG (ref 26.5–33)
MCHC RBC AUTO-ENTMCNC: 31.1 G/DL (ref 31.5–36.5)
MCV RBC AUTO: 91 FL (ref 78–100)
MICROBIOLOGIST REVIEW: NORMAL
MONOCYTES # BLD AUTO: 0.8 10E9/L (ref 0–1.3)
MONOCYTES NFR BLD AUTO: 8.4 %
NEUTROPHILS # BLD AUTO: 7 10E9/L (ref 1.6–8.3)
NEUTROPHILS NFR BLD AUTO: 70 %
NRBC # BLD AUTO: 0 10*3/UL
NRBC BLD AUTO-RTO: 0 /100
NT-PROBNP SERPL-MCNC: 19 PG/ML (ref 0–900)
PLATELET # BLD AUTO: 180 10E9/L (ref 150–450)
POTASSIUM SERPL-SCNC: 4.5 MMOL/L (ref 3.4–5.3)
PROCALCITONIN SERPL-MCNC: <0.05 NG/ML
RBC # BLD AUTO: 5.34 10E12/L (ref 3.8–5.2)
RSV RNA SPEC QL NAA+PROBE: NOT DETECTED
RSV RNA SPEC QL NAA+PROBE: NOT DETECTED
RV+EV RNA SPEC QL NAA+PROBE: NOT DETECTED
SARS-COV-2 RNA RESP QL NAA+PROBE: NEGATIVE
SODIUM SERPL-SCNC: 135 MMOL/L (ref 133–144)
SPECIMEN SOURCE: NORMAL
TROPONIN I SERPL-MCNC: <0.015 UG/L (ref 0–0.04)
TROPONIN I SERPL-MCNC: <0.015 UG/L (ref 0–0.04)
WBC # BLD AUTO: 9.9 10E9/L (ref 4–11)

## 2021-06-22 PROCEDURE — 87633 RESP VIRUS 12-25 TARGETS: CPT | Performed by: STUDENT IN AN ORGANIZED HEALTH CARE EDUCATION/TRAINING PROGRAM

## 2021-06-22 PROCEDURE — 83880 ASSAY OF NATRIURETIC PEPTIDE: CPT | Performed by: EMERGENCY MEDICINE

## 2021-06-22 PROCEDURE — U0005 INFEC AGEN DETEC AMPLI PROBE: HCPCS | Performed by: EMERGENCY MEDICINE

## 2021-06-22 PROCEDURE — 99285 EMERGENCY DEPT VISIT HI MDM: CPT | Mod: 25 | Performed by: EMERGENCY MEDICINE

## 2021-06-22 PROCEDURE — 250N000013 HC RX MED GY IP 250 OP 250 PS 637: Performed by: STUDENT IN AN ORGANIZED HEALTH CARE EDUCATION/TRAINING PROGRAM

## 2021-06-22 PROCEDURE — 85379 FIBRIN DEGRADATION QUANT: CPT | Performed by: EMERGENCY MEDICINE

## 2021-06-22 PROCEDURE — 71045 X-RAY EXAM CHEST 1 VIEW: CPT

## 2021-06-22 PROCEDURE — 85025 COMPLETE CBC W/AUTO DIFF WBC: CPT | Performed by: EMERGENCY MEDICINE

## 2021-06-22 PROCEDURE — 71046 X-RAY EXAM CHEST 2 VIEWS: CPT

## 2021-06-22 PROCEDURE — 80048 BASIC METABOLIC PNL TOTAL CA: CPT | Performed by: EMERGENCY MEDICINE

## 2021-06-22 PROCEDURE — 36415 COLL VENOUS BLD VENIPUNCTURE: CPT | Performed by: STUDENT IN AN ORGANIZED HEALTH CARE EDUCATION/TRAINING PROGRAM

## 2021-06-22 PROCEDURE — 84484 ASSAY OF TROPONIN QUANT: CPT | Performed by: STUDENT IN AN ORGANIZED HEALTH CARE EDUCATION/TRAINING PROGRAM

## 2021-06-22 PROCEDURE — 71046 X-RAY EXAM CHEST 2 VIEWS: CPT | Mod: 26 | Performed by: RADIOLOGY

## 2021-06-22 PROCEDURE — 87486 CHLMYD PNEUM DNA AMP PROBE: CPT | Performed by: STUDENT IN AN ORGANIZED HEALTH CARE EDUCATION/TRAINING PROGRAM

## 2021-06-22 PROCEDURE — 999N001017 HC STATISTIC GLUCOSE BY METER IP

## 2021-06-22 PROCEDURE — 99285 EMERGENCY DEPT VISIT HI MDM: CPT | Performed by: EMERGENCY MEDICINE

## 2021-06-22 PROCEDURE — 999N000157 HC STATISTIC RCP TIME EA 10 MIN

## 2021-06-22 PROCEDURE — 250N000011 HC RX IP 250 OP 636: Performed by: EMERGENCY MEDICINE

## 2021-06-22 PROCEDURE — C9803 HOPD COVID-19 SPEC COLLECT: HCPCS | Performed by: EMERGENCY MEDICINE

## 2021-06-22 PROCEDURE — 96365 THER/PROPH/DIAG IV INF INIT: CPT | Performed by: EMERGENCY MEDICINE

## 2021-06-22 PROCEDURE — 250N000011 HC RX IP 250 OP 636: Performed by: STUDENT IN AN ORGANIZED HEALTH CARE EDUCATION/TRAINING PROGRAM

## 2021-06-22 PROCEDURE — 99222 1ST HOSP IP/OBS MODERATE 55: CPT | Mod: AI | Performed by: FAMILY MEDICINE

## 2021-06-22 PROCEDURE — 120N000002 HC R&B MED SURG/OB UMMC

## 2021-06-22 PROCEDURE — 84145 PROCALCITONIN (PCT): CPT | Performed by: EMERGENCY MEDICINE

## 2021-06-22 PROCEDURE — 250N000009 HC RX 250: Performed by: EMERGENCY MEDICINE

## 2021-06-22 PROCEDURE — U0003 INFECTIOUS AGENT DETECTION BY NUCLEIC ACID (DNA OR RNA); SEVERE ACUTE RESPIRATORY SYNDROME CORONAVIRUS 2 (SARS-COV-2) (CORONAVIRUS DISEASE [COVID-19]), AMPLIFIED PROBE TECHNIQUE, MAKING USE OF HIGH THROUGHPUT TECHNOLOGIES AS DESCRIBED BY CMS-2020-01-R: HCPCS | Performed by: EMERGENCY MEDICINE

## 2021-06-22 PROCEDURE — 96366 THER/PROPH/DIAG IV INF ADDON: CPT | Performed by: EMERGENCY MEDICINE

## 2021-06-22 PROCEDURE — 87581 M.PNEUMON DNA AMP PROBE: CPT | Performed by: STUDENT IN AN ORGANIZED HEALTH CARE EDUCATION/TRAINING PROGRAM

## 2021-06-22 PROCEDURE — 84484 ASSAY OF TROPONIN QUANT: CPT | Performed by: EMERGENCY MEDICINE

## 2021-06-22 PROCEDURE — 86140 C-REACTIVE PROTEIN: CPT | Performed by: EMERGENCY MEDICINE

## 2021-06-22 PROCEDURE — 71045 X-RAY EXAM CHEST 1 VIEW: CPT | Mod: 26 | Performed by: RADIOLOGY

## 2021-06-22 RX ORDER — ALBUTEROL SULFATE 0.83 MG/ML
2.5 SOLUTION RESPIRATORY (INHALATION) ONCE
Status: COMPLETED | OUTPATIENT
Start: 2021-06-22 | End: 2021-06-22

## 2021-06-22 RX ORDER — LEVOFLOXACIN 5 MG/ML
750 INJECTION, SOLUTION INTRAVENOUS ONCE
Status: COMPLETED | OUTPATIENT
Start: 2021-06-22 | End: 2021-06-22

## 2021-06-22 RX ORDER — NICOTINE 21 MG/24HR
1 PATCH, TRANSDERMAL 24 HOURS TRANSDERMAL EVERY 24 HOURS
Status: DISCONTINUED | OUTPATIENT
Start: 2021-06-22 | End: 2021-06-25 | Stop reason: HOSPADM

## 2021-06-22 RX ORDER — LIDOCAINE 40 MG/G
CREAM TOPICAL
Status: DISCONTINUED | OUTPATIENT
Start: 2021-06-22 | End: 2021-06-25 | Stop reason: HOSPADM

## 2021-06-22 RX ORDER — GABAPENTIN 300 MG/1
300 CAPSULE ORAL AT BEDTIME
Status: DISCONTINUED | OUTPATIENT
Start: 2021-06-22 | End: 2021-06-25 | Stop reason: HOSPADM

## 2021-06-22 RX ORDER — IPRATROPIUM BROMIDE AND ALBUTEROL SULFATE 2.5; .5 MG/3ML; MG/3ML
3 SOLUTION RESPIRATORY (INHALATION) EVERY 4 HOURS PRN
Status: DISCONTINUED | OUTPATIENT
Start: 2021-06-22 | End: 2021-06-23

## 2021-06-22 RX ORDER — AMLODIPINE BESYLATE 10 MG/1
10 TABLET ORAL DAILY
Status: DISCONTINUED | OUTPATIENT
Start: 2021-06-23 | End: 2021-06-25 | Stop reason: HOSPADM

## 2021-06-22 RX ORDER — ALBUTEROL SULFATE 5 MG/ML
2.5 SOLUTION RESPIRATORY (INHALATION) EVERY 4 HOURS PRN
Status: DISCONTINUED | OUTPATIENT
Start: 2021-06-22 | End: 2021-06-22

## 2021-06-22 RX ORDER — ASPIRIN 81 MG/1
81 TABLET ORAL DAILY
COMMUNITY
End: 2021-11-10

## 2021-06-22 RX ORDER — CETIRIZINE HYDROCHLORIDE 10 MG/1
10 TABLET ORAL DAILY
Status: DISCONTINUED | OUTPATIENT
Start: 2021-06-23 | End: 2021-06-25 | Stop reason: HOSPADM

## 2021-06-22 RX ORDER — GLIPIZIDE 2.5 MG/1
2.5 TABLET, EXTENDED RELEASE ORAL DAILY
Status: DISCONTINUED | OUTPATIENT
Start: 2021-06-22 | End: 2021-06-25 | Stop reason: HOSPADM

## 2021-06-22 RX ORDER — DEXTROSE MONOHYDRATE 25 G/50ML
25-50 INJECTION, SOLUTION INTRAVENOUS
Status: DISCONTINUED | OUTPATIENT
Start: 2021-06-22 | End: 2021-06-25 | Stop reason: HOSPADM

## 2021-06-22 RX ORDER — LEVOFLOXACIN 5 MG/ML
750 INJECTION, SOLUTION INTRAVENOUS EVERY 24 HOURS
Status: DISCONTINUED | OUTPATIENT
Start: 2021-06-23 | End: 2021-06-24

## 2021-06-22 RX ORDER — VITAMIN B COMPLEX
25 TABLET ORAL DAILY
Status: DISCONTINUED | OUTPATIENT
Start: 2021-06-23 | End: 2021-06-25 | Stop reason: HOSPADM

## 2021-06-22 RX ORDER — NICOTINE POLACRILEX 4 MG
15-30 LOZENGE BUCCAL
Status: DISCONTINUED | OUTPATIENT
Start: 2021-06-22 | End: 2021-06-25 | Stop reason: HOSPADM

## 2021-06-22 RX ADMIN — ENOXAPARIN SODIUM 40 MG: 100 INJECTION SUBCUTANEOUS at 20:52

## 2021-06-22 RX ADMIN — LEVOFLOXACIN 750 MG: 5 INJECTION, SOLUTION INTRAVENOUS at 13:12

## 2021-06-22 RX ADMIN — ALBUTEROL SULFATE 2.5 MG: 2.5 SOLUTION RESPIRATORY (INHALATION) at 13:11

## 2021-06-22 RX ADMIN — NICOTINE 1 PATCH: 14 PATCH, EXTENDED RELEASE TRANSDERMAL at 20:52

## 2021-06-22 ASSESSMENT — ENCOUNTER SYMPTOMS
CHILLS: 1
EYE REDNESS: 0
NECK STIFFNESS: 0
SHORTNESS OF BREATH: 1
ARTHRALGIAS: 0
HEADACHES: 0
DIFFICULTY URINATING: 0
CONFUSION: 0
WHEEZING: 1
COLOR CHANGE: 0
ADENOPATHY: 0
FEVER: 0
FATIGUE: 1
VOMITING: 0
NAUSEA: 0
POLYDIPSIA: 0
ABDOMINAL PAIN: 0
BRUISES/BLEEDS EASILY: 0
COUGH: 1

## 2021-06-22 ASSESSMENT — MIFFLIN-ST. JEOR: SCORE: 1373.22

## 2021-06-22 NOTE — H&P
"[facility] Family Medicine History and Physical        Date of Admission:  6/22/2021  Date of Service: 6/22/2021         HPI      Chief Complaint   Shortness of breath  Chest pain    History is obtained from the patient, EMS, and ED providers.    History of Present Illness   Jenn is a 66 year old female w/ a pmhx of COPD (2L O2, FEV1<24% from PFT's in 12/2020), adenocarcinoma of lung s/p lobectomy (2/2016), suspected NSCLC s/p radiation therapy (1/2020), tobacco use, T2DM and HTN who presents presents with shortness of breath and chest pain.    Patient reports that her SOB has been chronic, worsening in the last week. States that her apartment is generally surrounded by many people who smoke and use other substances, making it easy to trigger her COPD exacerbations. She went to stay at her daughter's house on Friday, 4 days ago, in an attempt to calm down her symptoms however they continued to worsen. She only brought her Duoneb, albuterol inhaler, and O2, w/ her and states the nebs did help but only minimally. Normally, the patient has prednisone and doxycycline to be used in instances of her COPD flaring up however states she left that at home. Patient normally able to walk one block but the last week, would get SOB just walking around her room. Symptoms worsen w/ exertion. Reports associated mid chest pain w/ radiation to the back starting 4 days ago, worsening now into a 10/10. Pain is constant and described as a \"pressure.\" Reports her pain improves or worsens w/ positioning and deep breathing. States she had similar symptoms w/ previous COPD flares. Reports associated cough worsening 3 days ago w/ initially yellow sputum, turning green 1 day ago. Cough worsens her chest pain and also causes chest \"spasms.\"     This AM, symptoms worsened, where she couldn't speak very well prompting them to call EMS. EMS reports patient sat's were 75% on 2L O2, they " "gave solu-medrol, two duonebs and CPAP, 5L O2, improving her symptoms.    ED Course:   Fluids:  Labs: WBC 9900. Hgb 15.1. Creatinine 0.49. Troponin negative. Covid 19 negative.   Imaging: CXR \"increased left basilar opacities\"  Meds: Duo neb x2, and Solumedrol given prior to ED arrival. Albuterol nebulizer x1  Abx: IV Levofloxacin 750 mg.    The patient was admitted to the Family Medicine inpatient service for management of COPD exacerbation and possible treatment of pneumonia.         History:   Review of Systems   CONSTITUTIONAL:  Negative. No fevers.  HEENT:  negative except for rhinorrhea  RESPIRATORY:  positive for  cough with sputum (green, thick), dyspnea, wheezing and pleuritic, reproducible chest pain  CARDIOVASCULAR:  Positive for dyspnea, fatigue, negative for chest palpitaions  GASTROINTESTINAL:  Negative for diarrhea, constipation  GENITOURINARY:  negative  ENDOCRINE: heat intolerance and cold intolerance  MUSCULOSKELETAL: negative  NEUROLOGICAL:  negative    Past Medical History    I have reviewed this patient's medical history and updated it with pertinent information if needed.   Past Medical History:   Diagnosis Date     Adenocarcinoma, lung (H)      Asthma      Ectopic pregnancy      Pulmonary emphysema (H)     Very severe FEV1<30% predicted     Type II diabetes mellitus (H)    Recent diagnosis of sleep apnea.     Past Surgical History   I have reviewed this patient's surgical history and updated it with pertinent information if needed.  No past surgical history on file. Right lower lobe lobectomy 2019.    Social History   Social History     Tobacco Use     Smoking status: Current Every Day Smoker     Packs/day: 0.25     Types: Cigarettes     Smokeless tobacco: Former User     Tobacco comment: 2/24/21 Patient has quit as of 2/12/21. Patient will be considered former smokers if she remains smoke-free for 6 months   Substance Use Topics     Alcohol use: Yes     Comment: sometime     Drug use: No "     Family History   I have reviewed this patient's family history and updated it with pertinent information if needed.   Family History   Problem Relation Age of Onset     Depression Daughter      Alcohol/Drug Other         self     Diabetes Other         self     Thyroid Disease Other         self     Asthma Other         self     Prior to Admission Medications   Prior to Admission Medications   Prescriptions Last Dose Informant Patient Reported? Taking?   Alpha-Lipoic Acid 300 MG CAPS 6/20/2021  Yes Yes   Sig: Take 300 mg by mouth daily   Lidocaine (SALONPAS PAIN RELIEVING) 4 % Patch 6/16/2021  No Yes   Sig: Place onto the skin every 24 hours   albuterol (PROAIR HFA/PROVENTIL HFA/VENTOLIN HFA) 108 (90 Base) MCG/ACT inhaler 6/22/2021 at am  No Yes   Sig: INHALE 1 OR 2 PUFFS BY MOUTH EVERY 4 HOURS AS NEEDED   amLODIPine (NORVASC) 10 MG tablet 6/20/2021  No Yes   Sig: Take 1 tablet (10 mg) by mouth daily   aspirin 81 MG EC tablet 6/20/2021  Yes Yes   Sig: Take 81 mg by mouth daily   benzonatate (TESSALON) 200 MG capsule Past Week  No Yes   Sig: Take 1 capsule (200 mg) by mouth 3 times daily as needed for cough   cetirizine (ZYRTEC) 10 MG tablet 6/20/2021  No Yes   Sig: Take 1 tablet (10 mg) by mouth daily   doxycycline hyclate (VIBRAMYCIN) 100 MG capsule Past Month  No Yes   Sig: Take 100 mg twice daily for five days if needed for treatment of a COPD exacerbation. Use alongside prednisone.   fluticasone-vilanterol (BREO ELLIPTA) 200-25 MCG/INH inhaler 6/20/2021  No Yes   Sig: Inhale 1 puff into the lungs daily   gabapentin (NEURONTIN) 300 MG capsule 6/22/2021 at am  No Yes   Sig: TAKE 1 CAPSULE BY MOUTH AT BEDTIME   glipiZIDE (GLUCOTROL XL) 2.5 MG 24 hr tablet 6/20/2021  No Yes   Sig: Take 1 tablet (2.5 mg) by mouth daily   guaiFENesin (ROBITUSSIN) 20 mg/mL SOLN solution Past Month  No Yes   Sig: Take 10 mLs by mouth every 4 hours as needed for cough   ipratropium - albuterol 0.5 mg/2.5 mg/3 mL (DUONEB) 0.5-2.5 (3)  MG/3ML neb solution 6/22/2021 at am  No Yes   Sig: Take 1 vial (3 mLs) by nebulization every 6 hours as needed for shortness of breath / dyspnea or wheezing   nicotine (NICODERM CQ) 14 MG/24HR 24 hr patch 6/16/2021  No Yes   Sig: Place 1 patch onto the skin every 24 hours   predniSONE (DELTASONE) 20 MG tablet Past Month  No Yes   Sig: Take 40 mg daily for 5 days if needed for treatment of a COPD exacerbation, use alongside doxycycline   sodium chloride (NEBUSAL) 3 % neb solution Unknown  No Yes   Sig: Take 3 mLs by nebulization daily Use 1-2 times daily in combination with Duoneb and Aerobika to help with mucus clearance   umeclidinium (INCRUSE ELLIPTA) 62.5 MCG/INH inhaler 6/22/2021 at am  No Yes   Sig: Inhale 1 puff into the lungs daily   vitamin D3 (CHOLECALCIFEROL) 50 mcg (2000 units) tablet Past Week  Yes Yes   Sig: Take by mouth daily Patient is unsure of dose but takes one tablet daily and things it is 50 mcg      Facility-Administered Medications: None     Allergies   Allergies   Allergen Reactions     Interferons Dermatitis     Penicillins Hives     Aspirin      325mg      Codeine      Colon Care          Physical Exam      Vital Signs: Temp: 97.5  F (36.4  C) Temp src: Axillary BP: (!) 142/71 Pulse: 107   Resp: 20 SpO2: 98 % O2 Device: Nasal cannula Oxygen Delivery: 4 LPM  Weight: 180 lbs 0 oz    Physical Exam  Constitutional:       General: She is not in acute distress.     Appearance: Normal appearance.   HENT:      Head: Normocephalic and atraumatic.      Nose: Nose normal. No congestion or rhinorrhea.      Mouth/Throat:      Mouth: Mucous membranes are moist.      Pharynx: No oropharyngeal exudate or posterior oropharyngeal erythema.   Eyes:      General:         Right eye: No discharge.         Left eye: No discharge.      Extraocular Movements: Extraocular movements intact.      Conjunctiva/sclera: Conjunctivae normal.   Neck:      Musculoskeletal: Normal range of motion and neck supple. No neck  rigidity or muscular tenderness.   Cardiovascular:      Rate and Rhythm: Regular rhythm. Tachycardia present.      Pulses: Normal pulses.      Heart sounds: Normal heart sounds. No murmur. No friction rub. No gallop.    Pulmonary:      Effort: No tachypnea, accessory muscle usage or respiratory distress.      Breath sounds: Wheezing (Expiratory bilaterally throughout, not in the RLL given lobectomy) present.   Chest:      Chest wall: Tenderness (reproducible sternal tenderness) present.   Abdominal:      General: Abdomen is flat. Bowel sounds are normal. There is no distension.      Palpations: Abdomen is soft.      Tenderness: There is no abdominal tenderness. There is no guarding.   Musculoskeletal: Normal range of motion.         General: No tenderness.      Right lower leg: No edema.      Left lower leg: No edema.   Skin:     General: Skin is warm and dry.   Neurological:      General: No focal deficit present.      Mental Status: She is alert and oriented to person, place, and time.      Cranial Nerves: No cranial nerve deficit.      Motor: No weakness.   Psychiatric:         Mood and Affect: Mood normal.         Behavior: Behavior normal.         Thought Content: Thought content normal.       Assessment & Plan      Jenn is a 66 year old female with a history of severe COPD, adenocarcinoma of lung s/p lobectomy (2/2016), suspected NSCLC s/p radiation therapy (1/2020), tobacco use disorder, T2DM and HTN who presents presents with shortness of breath and chest pain.    # COPD exacerbation  # Shortness of breath,   # hypoxia, SpO2 75% on admission  # tachycardia  # adenocarcinoma of lung s/p lobectomy (2/2016)  # suspected NSCLC s/p radiation therapy (1/2020)  FEV1<24% in 12/2020. Baseline 2L O2. Has steroids and abx for outpatient exacerbation but did not try prior to arrival. Ddx: pleural effusion (suspected NSCLC, CXR negative), pneumothorax (CXR neg), bacterial pneumonia (afebrile, CBC wnl, CXR without  consolidations), PE (exam negative, O2 improved with treatment), RSV (panel pending), COVID19 (fully vaccinated, negative test on admission). Most likely to be exacerbation of the patient's COPD and we will manage according to GOLD Criteria. She was admitted with similar presentation 2/21 and was seen by COPD team; prescribed Incruse Ellipta on discharge but did not start.  - O2 NC wean as tolerated   - Prednisone 40 mg daily 5-10 days  - Levaquin 750 for 5-7 days  - Duoneb Q4hrs prn  - Continuous pulse ox  - Vitals Q8hrs  - Daily CBCs  - PTA Cetirizine 10mg daily  - Consider consulting COPD team (CPAP vs BiPAP)  - consider LABA at discharge    #Chest pain  #Pleuritic pain  Similar to previous chest pain associated w/ previous COPD flares. Negative ACS work up completed including Troponin x2 negative, EKG w/ sinus tachycardia.  Likely pleuritic given positional nature of pain, worsening w/ cough and reproducible pain on palpation. Per chart review, has CKD3 diagnosis but unable to find corresponding labs; GFR>90 on admission so feel it is safe to give NSAIDs.  - daily BMP  - Ibuprofen 600 mg q6hrs as needed for pain.     Chronic:    #HTN  Hypertensive on admission.  - continue PTA amlodipine 10 mg    #T2DM  Last A1c from 2/2021 7.0.   - HOLD PTA glipizide  - hypoglycemia protocol; hypoglycemia known side effect of Levofloxacin  - Medium sliding scale insulin    #Tobacco Use  Hx of tobacco use.   - Continue PTA Nicotine patches 14 mg q24hrs    # chronic neck pain  - PTA Gabapentin 300mg qhs    # Hepatitis C, cured  Genotype 1a. Was treated at [facility] 2010 with viral suppression. Negative PCR quant 10/2019    # Pain Assessment:  Current Pain Score 6/22/2021   Patient currently in pain? denies   - Jenn is experiencing pain due to COPD exacerbation. Pain management was discussed and the plan was created in a collaborative fashion.  Jenn's response to the current recommendations: compliant    Diet: Normal Diet  Fluids:  PO  DVT Prophylaxis: Lovenox (PADUA 4)  Code Status: Full Code    Disposition Plan   Expected discharge: 2 - 3 days; recommended to prior living arrangement once antibiotic plan established, and O2 normalized to baseline, and COPD well controlled.     Entered: Cleopatra Atkinson 06/22/2021, 9:13 PM   Information in the above section will display in the discharge planner report.    Discussed with and examined by faculty.    Zachary Ventura  MS4    [facility]  [facility]   Pager: [phone]  Please see sticky note for cross cover information    I was present with the medical student who participated in the service and in the documentation of this note. I have verified the history and personally performed the physical exam and medical decision making. I have verified and edited the note, which accurately reflects my assessment of the patient and the plan of care.    Cleopatra Atkinson, DO   she/her  PGY-1      Results:     Data   Recent Labs   Lab 06/22/21  1804 06/22/21  1258   WBC  --  9.9   HGB  --  15.1   MCV  --  91   PLT  --  180   NA  --  135   POTASSIUM  --  4.5   CHLORIDE  --  100   CO2  --  31   BUN  --  11   CR  --  0.49*   ANIONGAP  --  3   LUIGI  --  8.9   GLC  --  156*   TROPI <0.015 <0.015     Recent Results (from the past 24 hour(s))   XR Chest Port 1 View    Narrative    Exam: XR CHEST PORT 1 VIEW, 6/22/2021 1:35 PM    Indication: SOB, cough, chest pain    Comparison: 4/2/2021, 2/23/2021    Findings:   Portable AP view of the chest was obtained. The cardiac silhouette is  normal. No pneumothorax or significant pleural effusion. Unchanged  chronic blunting of the right costophrenic angle. Increased left  basilar opacities. No acute osseous abnormalities.      Impression    Impression:   Increased left basilar opacities may represent infection, aspiration,  or atelectasis.    RICARDA EUBANKS MD   XR Chest 2 Views    Narrative    Chest 2 views 6/22/2021 at 1710  hours    INDICATION: Increased oxygen requirement    COMPARISON: Earlier today 1324 hours    FINDINGS: Heart size and shape appear normal. There is atherosclerosis  of the aortic arch. Bony structures show mild degenerative change of  the right shoulder AC joint an mild degenerative change in the  thoracic spine. No suspicious nodule or consolidation, or ectopic air  collection. Improved aeration of the left lower lung.      Impression    IMPRESSION: Aortic atherosclerosis. Improved aeration of left lower  lung.    MARK BARRERA MD

## 2021-06-22 NOTE — ED TRIAGE NOTES
Pt brought in by ambulance with reports of shortness of breath and chest pain. Pt was placed on CPAP by medics and gave 2 duo nebebs and 125 solu-medrol.

## 2021-06-22 NOTE — ED NOTES
Bed: ED03  Expected date: 6/22/21  Expected time: 12:23 PM  Means of arrival: Ambulance  Comments:  Doris [address]82 with a 65 yo F reports of COPD exacerbation. Initial sats 90% imporved with CPAP and duoneb

## 2021-06-22 NOTE — LETTER
June 25, 2021      Jenn Aparicio  [address]  [place] [id number]        To Whom it May Concern,    Ms. Jenn Aparicio was recently treated by my team at [facility]. Given her chronic illness, it is quite important that she be in a mold-free, smoke free apartment. Any indoor smoking within the building will likely exacerbate her symptoms, increase her need for supplemental oxygen, and could lead to another hospitalization. She would also benefit from an air filter and hardwood floors instead of carpets. However, if her apartment does have carpets, these should be professionally cleaned at least twice a year to remove build up of dust mites and allergens.    Please contact me if you have any additional questions,      Sincerely,        Dr. Cleopatra Atkinson

## 2021-06-22 NOTE — ED PROVIDER NOTES
[place]GENC[facility] DEPARTMENT ([facility])  6/22/21  History     Chief Complaint   Patient presents with     Shortness of Breath     The history is provided by the patient, medical records and the EMS personnel. The history is limited by the condition of the patient.     Jenn Aparicio is a 66 year old female with a history notable for COPD on 2L O2, adenocarcinoma of lung s/p lobectomy (02/2016), suspected NSCLC s/p radiation therapy (1/2020), tobacco use, DM 2 and HTN who presents to the ED via EMS from home for evaluation of chest pain or shortness of breath.  Patient reports 2 days of chest pain and worsening shortness of breath.  Patient notes she is on 2 L supplemental oxygen at home.  Patient denies recorded fevers but endorses feeling hot and cold.  Patient also endorses productive cough of green sputum which began yesterday.  Patient denies having been infected with COVID-19 in the past.  Per chart review patient received a second dose of her maternal vaccine on 4/9/2021.  Patient does have a history of tobacco use but is not currently smoking.  Per EMS, patient was noted to be satting 75% on 2 L O2.  The patient received 2 DuoNeb's and 125 mg Solu-Medrol as well as placed on 5 L O2 which improved her oxygen saturations to 90s.  Patient was noted to be slightly tacky have systolic pressures that range from 140-160.  Patient's blood glucose was 169.    I have reviewed the Medications, Allergies, Past Medical and Surgical History, and Social History in the [email or web address] system.  PAST MEDICAL HISTORY:   Past Medical History:   Diagnosis Date     Adenocarcinoma, lung (H)      Asthma      Ectopic pregnancy      Pulmonary emphysema (H)     Very severe FEV1<30% predicted     Type II diabetes mellitus (H)        PAST SURGICAL HISTORY: No past surgical history on file.    Past medical history, past surgical history, medications, and allergies were reviewed with the patient. Additional pertinent items: None    FAMILY  HISTORY:   Family History   Problem Relation Age of Onset     Depression Daughter      Alcohol/Drug Other         self     Diabetes Other         self     Thyroid Disease Other         self     Asthma Other         self       SOCIAL HISTORY:   Social History     Tobacco Use     Smoking status: Current Every Day Smoker     Packs/day: 0.25     Types: Cigarettes     Smokeless tobacco: Former User     Tobacco comment: 2/24/21 Patient has quit as of 2/12/21. Patient will be considered former smokers if she remains smoke-free for 6 months   Substance Use Topics     Alcohol use: Yes     Comment: sometime     Social history was reviewed with the patient. Additional pertinent items: None      Patient's Medications   New Prescriptions    No medications on file   Previous Medications    ALBUTEROL (PROAIR HFA/PROVENTIL HFA/VENTOLIN HFA) 108 (90 BASE) MCG/ACT INHALER    INHALE 1 OR 2 PUFFS BY MOUTH EVERY 4 HOURS AS NEEDED    ALBUTEROL (PROVENTIL) (2.5 MG/3ML) 0.083% NEB SOLUTION    Take 1 vial (2.5 mg) by nebulization every 6 hours as needed for shortness of breath / dyspnea or wheezing    ALPHA-LIPOIC ACID 300 MG CAPS    Take 300 mg by mouth daily    AMLODIPINE (NORVASC) 10 MG TABLET    Take 1 tablet (10 mg) by mouth daily    ASPIRIN (ASPIR-81 PO)    Take  by mouth.    BENZONATATE (TESSALON) 200 MG CAPSULE    Take 1 capsule (200 mg) by mouth 3 times daily as needed for cough    CETIRIZINE (ZYRTEC) 10 MG TABLET    Take 1 tablet (10 mg) by mouth daily    DOXYCYCLINE HYCLATE (VIBRAMYCIN) 100 MG CAPSULE    Take 100 mg twice daily for five days if needed for treatment of a COPD exacerbation. Use alongside prednisone.    FLUTICASONE-VILANTEROL (BREO ELLIPTA) 200-25 MCG/INH INHALER    Inhale 1 puff into the lungs daily    GABAPENTIN (NEURONTIN) 300 MG CAPSULE    TAKE 1 CAPSULE BY MOUTH AT BEDTIME    GLIPIZIDE (GLUCOTROL XL) 2.5 MG 24 HR TABLET    Take 1 tablet (2.5 mg) by mouth daily    GUAIFENESIN (ROBITUSSIN) 20 MG/ML SOLN SOLUTION     Take 10 mLs by mouth every 4 hours as needed for cough    IPRATROPIUM - ALBUTEROL 0.5 MG/2.5 MG/3 ML (DUONEB) 0.5-2.5 (3) MG/3ML NEB SOLUTION    Take 1 vial (3 mLs) by nebulization every 6 hours as needed for shortness of breath / dyspnea or wheezing    LIDOCAINE (SALONPAS PAIN RELIEVING) 4 % PATCH    Place onto the skin every 24 hours    NICOTINE (NICODERM CQ) 14 MG/24HR 24 HR PATCH    Place 1 patch onto the skin every 24 hours    PREDNISONE (DELTASONE) 20 MG TABLET    Take 40 mg daily for 5 days if needed for treatment of a COPD exacerbation, use alongside doxycycline    SODIUM CHLORIDE (NEBUSAL) 3 % NEB SOLUTION    Take 3 mLs by nebulization daily Use 1-2 times daily in combination with Duoneb and Aerobika to help with mucus clearance    UMECLIDINIUM (INCRUSE ELLIPTA) 62.5 MCG/INH INHALER    Inhale 1 puff into the lungs daily    VITAMIN D3 (CHOLECALCIFEROL) 50 MCG (2000 UNITS) TABLET    Take by mouth daily Patient is unsure of dose but takes one tablet daily and things it is 50 mcg   Modified Medications    No medications on file   Discontinued Medications    FLUTICASONE-VILANTEROL (BREO ELLIPTA) 200-25 MCG/INH INHALER    Inhale 1 puff into the lungs daily          Allergies   Allergen Reactions     Interferons Dermatitis     Penicillins Hives     Aspirin      325mg      Codeine      Colon Care         Review of Systems   Constitutional: Positive for chills and fatigue. Negative for fever.   HENT: Negative for congestion.    Eyes: Negative for redness.   Respiratory: Positive for cough, shortness of breath and wheezing.    Cardiovascular: Positive for chest pain. Negative for leg swelling.   Gastrointestinal: Negative for abdominal pain, nausea and vomiting.   Endocrine: Negative for polydipsia and polyuria.   Genitourinary: Negative for difficulty urinating.   Musculoskeletal: Negative for arthralgias and neck stiffness.   Skin: Negative for color change.   Allergic/Immunologic: Negative for immunocompromised  "state.   Neurological: Negative for headaches.   Hematological: Negative for adenopathy. Does not bruise/bleed easily.   Psychiatric/Behavioral: Negative for confusion.     A complete review of systems was performed with pertinent positives and negatives noted in the HPI, and all other systems negative.    Physical Exam   BP: (!) 143/85  Pulse: 110  Temp: 97.5  F (36.4  C)  Resp: 20  Height: 167.6 cm (5' 6\")  Weight: 81.6 kg (180 lb)  SpO2: 100 %      Physical Exam  Vitals signs and nursing note reviewed.   Constitutional:       General: She is in acute distress ( From shortness of breath and increased work of breathing).      Appearance: She is ill-appearing. She is not toxic-appearing or diaphoretic.   HENT:      Head: Normocephalic and atraumatic.      Mouth/Throat:      Pharynx: No oropharyngeal exudate.   Eyes:      General: No scleral icterus.     Extraocular Movements: Extraocular movements intact.      Conjunctiva/sclera: Conjunctivae normal.   Neck:      Musculoskeletal: Normal range of motion.   Cardiovascular:      Rate and Rhythm: Tachycardia present.      Pulses: Normal pulses.      Heart sounds: Normal heart sounds.   Pulmonary:      Effort: Respiratory distress present.      Breath sounds: Wheezing ( Bilateral, diffuse, anterior and posterior lung fields) present. No rhonchi or rales.   Abdominal:      General: Bowel sounds are normal.      Palpations: Abdomen is soft.      Tenderness: There is no abdominal tenderness.   Musculoskeletal: Normal range of motion.         General: No tenderness.      Right lower leg: No edema.      Left lower leg: No edema.   Skin:     General: Skin is warm.      Findings: No rash.   Neurological:      General: No focal deficit present.      Mental Status: She is alert and oriented to person, place, and time.      Cranial Nerves: No cranial nerve deficit.      Coordination: Coordination normal.   Psychiatric:         Mood and Affect: Mood normal.         Behavior: " Behavior normal.         Thought Content: Thought content normal.         Judgment: Judgment normal.         ED Course   12:38 PM  The patient was seen and examined by Dr. Larry John in Room ED 03.      Procedures             EKG Interpretation:      Interpreted by Larry John MD  Time reviewed: 12:44 PM  Symptoms at time of EKG: Shortness of breath  Rhythm: Sinus tachycardia  Rate: 114  Axis: RAD  Ectopy: none  Conduction: normal  ST Segments/ T Waves: No ST-T wave changes  Q Waves: none  Comparison to prior: Slightly different from EKG done on February 24, 2021    Clinical Impression: Sinus tachycardia with right axis deviation without acute ischemic changes        Critical Care Addendum    My initial assessment, based on my review of prehospital provider report, review of nursing observations, review of vital signs, focused history, physical exam, review of cardiac rhythm monitor, 12 lead ECG analysis and interpretation of Laboratory studies, EKG, and chest x-ray , established that Jenn Aparicio has respiratory insufficiency, which requires immediate intervention, and therefore she is critically ill.     After the initial assessment, the care team initiated multiple lab tests and initiated medication therapy with Nebulized albuterol to provide stabilization care. Due to the critical nature of this patient, I reassessed nursing observations, vital signs, physical exam, review of cardiac rhythm monitor, 12 lead ECG analysis, interpretation of Laboratory studies, EKG, and chest x-ray, mental status and respiratory status multiple times prior to her disposition.     Time also spent performing documentation, reviewing test results, discussion with consultants and coordination of care.     Critical care time (excluding teaching time and procedures): 50 minutes.     Results for orders placed or performed during the hospital encounter of 06/22/21 (from the past 24 hour(s))   CBC with platelets differential   Result  Value Ref Range    WBC 9.9 4.0 - 11.0 10e9/L    RBC Count 5.34 (H) 3.8 - 5.2 10e12/L    Hemoglobin 15.1 11.7 - 15.7 g/dL    Hematocrit 48.5 (H) 35.0 - 47.0 %    MCV 91 78 - 100 fl    MCH 28.3 26.5 - 33.0 pg    MCHC 31.1 (L) 31.5 - 36.5 g/dL    RDW 12.8 10.0 - 15.0 %    Platelet Count 180 150 - 450 10e9/L    Diff Method Automated Method     % Neutrophils 70.0 %    % Lymphocytes 18.5 %    % Monocytes 8.4 %    % Eosinophils 2.3 %    % Basophils 0.5 %    % Immature Granulocytes 0.3 %    Nucleated RBCs 0 0 /100    Absolute Neutrophil 7.0 1.6 - 8.3 10e9/L    Absolute Lymphocytes 1.8 0.8 - 5.3 10e9/L    Absolute Monocytes 0.8 0.0 - 1.3 10e9/L    Absolute Eosinophils 0.2 0.0 - 0.7 10e9/L    Absolute Basophils 0.1 0.0 - 0.2 10e9/L    Abs Immature Granulocytes 0.0 0 - 0.4 10e9/L    Absolute Nucleated RBC 0.0    Basic metabolic panel   Result Value Ref Range    Sodium 135 133 - 144 mmol/L    Potassium 4.5 3.4 - 5.3 mmol/L    Chloride 100 94 - 109 mmol/L    Carbon Dioxide 31 20 - 32 mmol/L    Anion Gap 3 3 - 14 mmol/L    Glucose 156 (H) 70 - 99 mg/dL    Urea Nitrogen 11 7 - 30 mg/dL    Creatinine 0.49 (L) 0.52 - 1.04 mg/dL    GFR Estimate >90 >60 mL/min/[1.73_m2]    GFR Estimate If Black >90 >60 mL/min/[1.73_m2]    Calcium 8.9 8.5 - 10.1 mg/dL   Troponin I   Result Value Ref Range    Troponin I ES <0.015 0.000 - 0.045 ug/L   CRP inflammation   Result Value Ref Range    CRP Inflammation 4.1 0.0 - 8.0 mg/L   Nt probnp inpatient   Result Value Ref Range    N-Terminal Pro BNP Inpatient 19 0 - 900 pg/mL   Procalcitonin   Result Value Ref Range    Procalcitonin <0.05 ng/ml   D dimer quantitative   Result Value Ref Range    D Dimer 0.3 0.0 - 0.50 ug/ml FEU   Glucose by meter   Result Value Ref Range    Glucose 161 (H) 70 - 99 mg/dL   XR Chest Port 1 View    Narrative    Exam: XR CHEST PORT 1 VIEW, 6/22/2021 1:35 PM    Indication: SOB, cough, chest pain    Comparison: 4/2/2021, 2/23/2021    Findings:   Portable AP view of the chest was  obtained. The cardiac silhouette is  normal. No pneumothorax or significant pleural effusion. Unchanged  chronic blunting of the right costophrenic angle. Increased left  basilar opacities. No acute osseous abnormalities.      Impression    Impression:   Increased left basilar opacities may represent infection, aspiration,  or atelectasis.    RICARDA EUBANKS MD   Symptomatic SARS-CoV-2 COVID-19 Virus (Coronavirus) by PCR    Specimen: Nasopharyngeal   Result Value Ref Range    SARS-CoV-2 Virus Specimen Source Nasopharyngeal     SARS-CoV-2 PCR Result NEGATIVE     SARS-CoV-2 PCR Comment       Testing was performed using the [employer] Xpress SARS-CoV-2 Assay on the Cepheid Gene-Xpert   Instrument Systems. Additional information about this Emergency Use Authorization (EUA)   assay can be found via the Lab Guide.     Respiratory Panel PCR - NP Swab    Specimen: Nasopharyngeal swab   Result Value Ref Range    Adenovirus Not Detected NDET^Not Detected    Coronavirus Not Detected NDET^Not Detected    Human Metapneumovirus Not Detected NDET^Not Detected    Human Rhinovirus/Enterovirus Not Detected NDET^Not Detected    Influenza A Not Detected NDET^Not Detected    Influenza A, H1 Not Detected NDET^Not Detected    Influenza A 2009 H1N1 Not Detected NDET^Not Detected    Influenza A, H3 Not Detected NDET^Not Detected    Influenza B Not Detected NDET^Not Detected    Parainfluenza Virus 1 Not Detected NDET^Not Detected    Parainfluenza Virus 2 Not Detected NDET^Not Detected    Parainfluenza Virus 3 Not Detected NDET^Not Detected    Parainfluenza Virus 4 Not Detected NDET^Not Detected    Respiratory Syncytial Virus A Not Detected NDET^Not Detected    Respiratory Syncytial Virus B Not Detected NDET^Not Detected    Chlamydia pneumoniae Not Detected NDET^Not Detected    Mycoplasma pneumoniae Not Detected NDET^Not Detected    Respiratory Virus Comment See comment below    XR Chest 2 Views    Narrative    Chest 2 views 6/22/2021 at 1710  hours    INDICATION: Increased oxygen requirement    COMPARISON: Earlier today 1324 hours    FINDINGS: Heart size and shape appear normal. There is atherosclerosis  of the aortic arch. Bony structures show mild degenerative change of  the right shoulder AC joint an mild degenerative change in the  thoracic spine. No suspicious nodule or consolidation, or ectopic air  collection. Improved aeration of the left lower lung.      Impression    IMPRESSION: Aortic atherosclerosis. Improved aeration of left lower  lung.    MARK BARRERA MD   Troponin I   Result Value Ref Range    Troponin I ES <0.015 0.000 - 0.045 ug/L     Medications   ipratropium - albuterol 0.5 mg/2.5 mg/3 mL (DUONEB) neb solution 3 mL (has no administration in time range)   amLODIPine (NORVASC) tablet 10 mg (has no administration in time range)   cetirizine (zyrTEC) tablet 10 mg (has no administration in time range)   gabapentin (NEURONTIN) capsule 300 mg (has no administration in time range)   glipiZIDE (GLUCOTROL XL) 24 hr tablet 2.5 mg ( Oral Automatically Held 6/25/21 0800)   nicotine (NICODERM CQ) 14 MG/24HR 24 hr patch 1 patch (has no administration in time range)   Vitamin D3 (CHOLECALCIFEROL) tablet 25 mcg (has no administration in time range)   umeclidinium (INCRUSE ELLIPTA) 62.5 MCG/INH inhaler 1 puff ( Inhalation Automatically Held 6/25/21 0800)   lidocaine 1 % 0.1-1 mL (has no administration in time range)   lidocaine (LMX4) cream (has no administration in time range)   sodium chloride (PF) 0.9% PF flush 3 mL (has no administration in time range)   sodium chloride (PF) 0.9% PF flush 3 mL (has no administration in time range)   melatonin tablet 1 mg (has no administration in time range)   glucose gel 15-30 g (has no administration in time range)     Or   dextrose 50 % injection 25-50 mL (has no administration in time range)     Or   glucagon injection 1 mg (has no administration in time range)   enoxaparin ANTICOAGULANT (LOVENOX)  injection 40 mg (has no administration in time range)   ibuprofen (ADVIL/MOTRIN) tablet 600 mg (has no administration in time range)   insulin aspart (NovoLOG) injection (RAPID ACTING) (has no administration in time range)   insulin aspart (NovoLOG) injection (RAPID ACTING) (has no administration in time range)   levofloxacin (LEVAQUIN) infusion 750 mg (has no administration in time range)   nicotine Patch in Place (has no administration in time range)   albuterol (PROVENTIL) neb solution 2.5 mg (2.5 mg Nebulization Given 6/22/21 1311)   levofloxacin (LEVAQUIN) infusion 750 mg (0 mg Intravenous Stopped 6/22/21 1451)             Assessments & Plan (with Medical Decision Making)   This is a 66 year old female with history of COPD presenting to the emergency department with shortness of breath, hypoxemia, worsening cough over the last 2 days. Differential diagnosis: COPD exacerbation, pneumonia, COVID-19 infection, viral illness, ACS.      After thorough history and physical examination, patient appears to be mild distress due to increased work of breathing or shortness of breath.  She is diffusely wheezing.  She has already received 2 DuoNeb's and IV Solu-Medrol from EMS and I will give her a dose of nebulized albuterol at this time.  I will obtain laboratory studies and chest x-ray for further agnostic evaluation along with an EKG    Patient's laboratory studies returned without any evidence of leukocytosis, WBC is normal at 9900. There is no evidence of anemia, hemoglobin is normal at 15.1.  Electrolytes show her creatinine is slightly low at 0.49. Troponin is undetectable.  I reviewed her chest x-ray and I read the radiology report; there are increased left basilar opacities which are concerning for infection given the clinical picture.  Patient was started on IV levofloxacin for treatment of a COPD exacerbation and likely pneumonia.  COVID-19 test is negative.  Patient improved after emergency department treatment  she was transitioned from BiPAP to nasal cannula at 4 L/min.  At this time she will be admitted to the hospital for further evaluation and management of her COPD exacerbation and pneumonia.  She agrees with the plan.          I have reviewed the nursing notes.    I have reviewed the findings, diagnosis, plan and need for follow up with the patient.    New Prescriptions    No medications on file       Final diagnoses:   COPD exacerbation (H)   Pneumonia of left lower lobe due to infectious organism     I, Michael Aguilar, am serving as a trained medical scribe to document services personally performed by Larry John MD, based on the provider's statements to me.      I, Larry John MD, was physically present and have reviewed and verified the accuracy of this note documented by Michael Aguilar.     6/22/2021   [facility] EMERGENCY DEPARTMENT     Larry John MD  06/22/21 1915

## 2021-06-23 PROBLEM — C34.90 PRIMARY LUNG ADENOCARCINOMA (H): Status: ACTIVE | Noted: 2020-12-15

## 2021-06-23 LAB
ANION GAP SERPL CALCULATED.3IONS-SCNC: 5 MMOL/L (ref 3–14)
BUN SERPL-MCNC: 12 MG/DL (ref 7–30)
CALCIUM SERPL-MCNC: 9.2 MG/DL (ref 8.5–10.1)
CHLORIDE SERPL-SCNC: 102 MMOL/L (ref 94–109)
CO2 SERPL-SCNC: 30 MMOL/L (ref 20–32)
CREAT SERPL-MCNC: 0.56 MG/DL (ref 0.52–1.04)
ERYTHROCYTE [DISTWIDTH] IN BLOOD BY AUTOMATED COUNT: 12.8 % (ref 10–15)
GFR SERPL CREATININE-BSD FRML MDRD: >90 ML/MIN/{1.73_M2}
GLUCOSE BLDC GLUCOMTR-MCNC: 160 MG/DL (ref 70–99)
GLUCOSE BLDC GLUCOMTR-MCNC: 177 MG/DL (ref 70–99)
GLUCOSE BLDC GLUCOMTR-MCNC: 180 MG/DL (ref 70–99)
GLUCOSE BLDC GLUCOMTR-MCNC: 319 MG/DL (ref 70–99)
GLUCOSE SERPL-MCNC: 185 MG/DL (ref 70–99)
HCT VFR BLD AUTO: 45.3 % (ref 35–47)
HGB BLD-MCNC: 14.3 G/DL (ref 11.7–15.7)
MCH RBC QN AUTO: 28.6 PG (ref 26.5–33)
MCHC RBC AUTO-ENTMCNC: 31.6 G/DL (ref 31.5–36.5)
MCV RBC AUTO: 91 FL (ref 78–100)
PLATELET # BLD AUTO: 196 10E9/L (ref 150–450)
POTASSIUM SERPL-SCNC: 4.4 MMOL/L (ref 3.4–5.3)
RBC # BLD AUTO: 5 10E12/L (ref 3.8–5.2)
SODIUM SERPL-SCNC: 138 MMOL/L (ref 133–144)
WBC # BLD AUTO: 9 10E9/L (ref 4–11)

## 2021-06-23 PROCEDURE — 999N000156 HC STATISTIC RCP CONSULT EA 30 MIN

## 2021-06-23 PROCEDURE — 250N000009 HC RX 250: Performed by: STUDENT IN AN ORGANIZED HEALTH CARE EDUCATION/TRAINING PROGRAM

## 2021-06-23 PROCEDURE — 999N001017 HC STATISTIC GLUCOSE BY METER IP

## 2021-06-23 PROCEDURE — 250N000012 HC RX MED GY IP 250 OP 636 PS 637: Performed by: STUDENT IN AN ORGANIZED HEALTH CARE EDUCATION/TRAINING PROGRAM

## 2021-06-23 PROCEDURE — 99233 SBSQ HOSP IP/OBS HIGH 50: CPT | Mod: GC | Performed by: STUDENT IN AN ORGANIZED HEALTH CARE EDUCATION/TRAINING PROGRAM

## 2021-06-23 PROCEDURE — 120N000002 HC R&B MED SURG/OB UMMC

## 2021-06-23 PROCEDURE — 250N000011 HC RX IP 250 OP 636: Performed by: STUDENT IN AN ORGANIZED HEALTH CARE EDUCATION/TRAINING PROGRAM

## 2021-06-23 PROCEDURE — 94640 AIRWAY INHALATION TREATMENT: CPT

## 2021-06-23 PROCEDURE — 36415 COLL VENOUS BLD VENIPUNCTURE: CPT | Performed by: STUDENT IN AN ORGANIZED HEALTH CARE EDUCATION/TRAINING PROGRAM

## 2021-06-23 PROCEDURE — 85027 COMPLETE CBC AUTOMATED: CPT | Performed by: STUDENT IN AN ORGANIZED HEALTH CARE EDUCATION/TRAINING PROGRAM

## 2021-06-23 PROCEDURE — 999N000157 HC STATISTIC RCP TIME EA 10 MIN

## 2021-06-23 PROCEDURE — 94664 DEMO&/EVAL PT USE INHALER: CPT

## 2021-06-23 PROCEDURE — 250N000013 HC RX MED GY IP 250 OP 250 PS 637: Performed by: STUDENT IN AN ORGANIZED HEALTH CARE EDUCATION/TRAINING PROGRAM

## 2021-06-23 PROCEDURE — 80048 BASIC METABOLIC PNL TOTAL CA: CPT | Performed by: STUDENT IN AN ORGANIZED HEALTH CARE EDUCATION/TRAINING PROGRAM

## 2021-06-23 RX ORDER — IPRATROPIUM BROMIDE AND ALBUTEROL SULFATE 2.5; .5 MG/3ML; MG/3ML
3 SOLUTION RESPIRATORY (INHALATION)
Status: DISCONTINUED | OUTPATIENT
Start: 2021-06-23 | End: 2021-06-23

## 2021-06-23 RX ORDER — IPRATROPIUM BROMIDE AND ALBUTEROL SULFATE 2.5; .5 MG/3ML; MG/3ML
3 SOLUTION RESPIRATORY (INHALATION)
Status: DISCONTINUED | OUTPATIENT
Start: 2021-06-23 | End: 2021-06-24

## 2021-06-23 RX ORDER — PREDNISONE 20 MG/1
40 TABLET ORAL DAILY
Status: DISCONTINUED | OUTPATIENT
Start: 2021-06-23 | End: 2021-06-25 | Stop reason: HOSPADM

## 2021-06-23 RX ADMIN — GABAPENTIN 300 MG: 300 CAPSULE ORAL at 21:04

## 2021-06-23 RX ADMIN — ENOXAPARIN SODIUM 40 MG: 100 INJECTION SUBCUTANEOUS at 18:17

## 2021-06-23 RX ADMIN — NICOTINE 1 PATCH: 14 PATCH, EXTENDED RELEASE TRANSDERMAL at 18:18

## 2021-06-23 RX ADMIN — IPRATROPIUM BROMIDE AND ALBUTEROL SULFATE 3 ML: .5; 3 SOLUTION RESPIRATORY (INHALATION) at 12:46

## 2021-06-23 RX ADMIN — IPRATROPIUM BROMIDE AND ALBUTEROL SULFATE 3 ML: .5; 3 SOLUTION RESPIRATORY (INHALATION) at 10:43

## 2021-06-23 RX ADMIN — CETIRIZINE HYDROCHLORIDE 10 MG: 10 TABLET, FILM COATED ORAL at 07:42

## 2021-06-23 RX ADMIN — AMLODIPINE BESYLATE 10 MG: 10 TABLET ORAL at 07:41

## 2021-06-23 RX ADMIN — PREDNISONE 40 MG: 20 TABLET ORAL at 15:17

## 2021-06-23 RX ADMIN — Medication 25 MCG: at 07:41

## 2021-06-23 RX ADMIN — GABAPENTIN 300 MG: 300 CAPSULE ORAL at 00:22

## 2021-06-23 RX ADMIN — LEVOFLOXACIN 750 MG: 5 INJECTION, SOLUTION INTRAVENOUS at 13:35

## 2021-06-23 RX ADMIN — IPRATROPIUM BROMIDE AND ALBUTEROL SULFATE 3 ML: 2.5; .5 SOLUTION RESPIRATORY (INHALATION) at 19:31

## 2021-06-23 RX ADMIN — INSULIN ASPART 1 UNITS: 100 INJECTION, SOLUTION INTRAVENOUS; SUBCUTANEOUS at 18:18

## 2021-06-23 ASSESSMENT — ACTIVITIES OF DAILY LIVING (ADL)
ADLS_ACUITY_SCORE: 18
ADLS_ACUITY_SCORE: 17

## 2021-06-23 NOTE — PROGRESS NOTES
[facility]    Family Medicine Progress Note - [facility]       Main Plans for Today   - Wean on O2 NC to baseline (2L) as tolerated  - Continue Levaquin Day 2/5  - Continue Prednisone Day 2/5  - Duonebs scheduled q6hr    Assessment & Plan   Jenn is a 66 year old female with a history of severe COPD, adenocarcinoma of lung s/p lobectomy (2/2016), suspected NSCLC s/p radiation therapy (1/2020), tobacco use disorder, T2DM and HTN who presents presents with shortness of breath and chest pain.    # COPD exacerbation  # Shortness of breath,   # hypoxia, SpO2 75% on admission  # tachycardia  # adenocarcinoma of lung s/p lobectomy (2/2016)  # suspected NSCLC s/p radiation therapy (1/2020)  FEV1<24% in 12/2020. Baseline 2L O2. Has steroids and abx for outpatient exacerbation but did not try prior to arrival. Most likely to be exacerbation of the patient's COPD and we will manage according to GOLD Criteria. She was admitted with similar presentation 2/21 and was seen by COPD team; prescribed Incruse Ellipta on discharge but did not start. RSV Panel negative 6/23/21.  - O2 NC wean as tolerated. Currently sat 98% on 3L.  - Prednisone 40mg daily 5 days 6/22 -   - Levaquin 750mg for 5 days 6/22 -   - Duoneb q6hr scheduled  - Continuous pulse ox  - Vitals Q8hrs  - Daily CBCs  - PTA Cetirizine 10mg daily  - Pharmacy liaison consulted regarding outpatient coverage. Patient was previously covered and had previously been prescribed breo and incruse inhalers but never started. Will start the patient on incruse and breo Ellipta in 1 day, switching her Duonebs to PRN at the time.  - Plan to complete Ambulatory oxygen monitoring to evaluate O2 for possible new base home O2.  - Reach out to care coordinator to see if she qualifies for outpatient care coordination, rides, etc.  - will write letter for housing    #Chest pain  #Pleuritic pain  Similar to previous chest pain associated w/  previous COPD flares. Negative ACS work up completed including Troponin x2 negative, EKG w/ sinus tachycardia.  Likely pleuritic given positional nature of pain, worsening w/ cough and reproducible pain on palpation. Per chart review, has CKD3 diagnosis but unable to find corresponding labs; GFR>90 on admission so feel it is safe to give NSAIDs.  - daily BMP  - Ibuprofen 600 mg q6hrs as needed for pain.      Chronic:  # presumed DAVID  - will refer for sleep study at time of discharge     #HTN  Hypertensive on admission.  - continue PTA amlodipine 10 mg     #T2DM  Last A1c from 2/2021 7.0.   - HOLD PTA glipizide  - hypoglycemia protocol; hypoglycemia known side effect of Levofloxacin  - Medium sliding scale insulin     #Tobacco Use  Hx of tobacco use.  - Continue PTA Nicotine patches 14 mg q24hrs     # chronic neck pain  - PTA Gabapentin 300mg qhs    # Hepatitis C, cured  Genotype 1a. Was treated at [facility] 2010 with viral suppression. Negative PCR quant 10/2019    # Pain Assessment:  Current Pain Score 6/23/2021   Patient currently in pain? denies   - Jenn is experiencing pain due to COPD exacerbation. Pain management was discussed and the plan was created in a collaborative fashion.  Jenn's response to the current recommendations: compliant    Diet: Combination Diet Regular Diet Adult  Fluids: PO  DVT Prophylaxis: Enoxaparin (Lovenox) subcutaneous (PADUA 4)  Code Status: Full Code    Disposition Plan   Expected discharge: Tomorrow, recommended to prior living arrangement once once antibiotic plan established, O2 normalized to baseline/new level established, and COPD well controlled.     The patient's care was discussed with the Attending Physician, Dr. Jos Boyer.    Dakota Ventura  MS4  [facility]'s Family Medicine  Pager: [phone]  Please see sticky note for cross cover information    I was present with the medical student who participated in the service and in the documentation of this note. I  have verified the history and personally performed the physical exam and medical decision making. I have verified and edited the note, which accurately reflects my assessment of the patient and the plan of care.    Cleopatra Atkinson, DO   she/her  PGY-1    Interval History   No acute events overnight. Patient reports sleep was ok, but still not great. Reports continued cough w/ sputum and chest pain. Breathing has improved and patient just recently weaned to 3 L. Has not received a neb since admission. Reports ongoing weakness and fatigue. Has not eaten since admission but breakfast just brought in during visit. Has urinated however no bowel movement's yet.    Physical Exam   Vital Signs: Temp: 98.2  F (36.8  C) Temp src: Oral BP: 136/58 Pulse: 109   Resp: 20 SpO2: 95 % O2 Device: Nasal cannula Oxygen Delivery: 3 LPM  Weight: 180 lbs 0 oz  General Appearance: Normal appearance. No acute distress.  Respiratory: Expiratory wheezing throughout, w/ exception to RLL given lobectomy. Noted wet cough.  Cardiovascular: Regular rate and rhythm. Normal S1 and S2. No murmurs.  Chest: Tenderness, reproducible sternal tenderness  GI: Flat, soft, non-tender. Normal bowel sounds.  Skin: Warm and dry  Neurological: A&Ox3. Normal behavior.    Data   Recent Labs   Lab 06/23/21  0626 06/22/21  1804 06/22/21  1258   WBC 9.0  --  9.9   HGB 14.3  --  15.1   MCV 91  --  91     --  180     --  135   POTASSIUM 4.4  --  4.5   CHLORIDE 102  --  100   CO2 30  --  31   BUN 12  --  11   CR 0.56  --  0.49*   ANIONGAP 5  --  3   LUIGI 9.2  --  8.9   *  --  156*   TROPI  --  <0.015 <0.015     Recent Results (from the past 24 hour(s))   XR Chest 2 Views    Narrative    Chest 2 views 6/22/2021 at 1710 hours    INDICATION: Increased oxygen requirement    COMPARISON: Earlier today 1324 hours    FINDINGS: Heart size and shape appear normal. There is atherosclerosis  of the aortic arch. Bony structures show mild degenerative change of  the  right shoulder AC joint an mild degenerative change in the  thoracic spine. No suspicious nodule or consolidation, or ectopic air  collection. Improved aeration of the left lower lung.      Impression    IMPRESSION: Aortic atherosclerosis. Improved aeration of left lower  lung.    MARK BARRERA MD

## 2021-06-23 NOTE — UTILIZATION REVIEW
Inpatient appropriate    Admission Status; Secondary Review Determination      Under the authority of the Utilization Management Committee, the utilization review process indicated a secondary review on the above patient. The review outcome is based on review of the medical records, discussions with staff, and applying clinical experience noted on the date of the review.    (x) Inpatient Status Appropriate - This patient's medical care is consistent with medical management for inpatient care and reasonable inpatient medical practice.    RATIONALE FOR DETERMINATION  66-year-old female with known history of COPD, supposed to be on chronic home oxygen at 2 L, adenocarcinoma of the lung s/p lobectomy, diabetes mellitus type 2 and hypertension who was admitted to [facility] on 6/22/2021 with COPD acute exacerbation. Per EMS her oxygen saturation was as low as 75% on 2 L oxygen.  She was initially placed on BiPAP and now subsequently weaned to 3 L/min.  She is on IV Levaquin and steroids.  Patient will need ongoing hospitalization for treatment of her COPD exacerbation before she can be safely discharged home.  Inpatient care is appropriate at this time.        At the time of admission with the information available to the attending physician more than 2 nights Hospital complex care was anticipated, based on patient risk of adverse outcome if treated as outpatient and complex care required. Inpatient admission is appropriate based on the Medicare guidelines.    This document was produced using voice recognition software      The information on this document is developed by the utilization review team in order for the business office to ensure compliance. This only denotes the appropriateness of proper admission status and does not reflect the quality of care rendered.  The definitions of Inpatient Status and Observation Status used in making the determination above are those provided in the CMS Coverage Manual, Chapter 1 and  Chapter 6, section 70.4.    Sincerely,    Utilization Review  Physician Advisor  [facility].

## 2021-06-23 NOTE — PLAN OF CARE
Focus: ADMISSION   D: Patient admitted to 5a from the Emergency room.  Patient was admitted with copd exacerbation and possible pnuemonia.   I: Settled patient into their room.  Vitals obtained.  Patient profile complete.  Consults entered as needed.    A: Patient is alert and oriented .  Patient is able to transfer. On 3L nasal cannula. Pulse 109. /58.  Respiratory rate in the 20's at rest.   Breath sounds wheezy through out. Shortness of breath with any activity.   Denies pain.  Up with SBA and walker to the [facility] to void.  Patient ate lunch before staff able to get a BG.  Recheck before dinner this evening.  Poor appetite endorsed by patient.    P:  Continue with monitoring patient and will notify MD with any concerns. Will follow MD orders for cares and medications.

## 2021-06-23 NOTE — PHARMACY-ADMISSION MEDICATION HISTORY
Admission Medication History Completed by Pharmacy    See Psychiatric Admission Navigator for allergy information, preferred outpatient pharmacy, prior to admission medications and immunization status.     Medication History Sources:     Patient    Dispense History    Changes made to PTA medication list (reason):    Added: None    Deleted:  o Albuterol neb solution -- discontinued/    Changed:  o Aspirin 81mg -- added daily (per patient)    Additional Information:    Patient states some medications were not with her (COPD exacerbation meds) because she was staying at daughter's home since 2020.    Patient does not recognize name of nebusal 3% solution medication    Prior to Admission medications    Medication Sig Last Dose Taking? Auth Provider   albuterol (PROAIR HFA/PROVENTIL HFA/VENTOLIN HFA) 108 (90 Base) MCG/ACT inhaler INHALE 1 OR 2 PUFFS BY MOUTH EVERY 4 HOURS AS NEEDED 2021 at am Yes Lynda Spann MD   Alpha-Lipoic Acid 300 MG CAPS Take 300 mg by mouth daily 2021 Yes Zarina Leung MD   amLODIPine (NORVASC) 10 MG tablet Take 1 tablet (10 mg) by mouth daily 2021 Yes Mitzi Nettles APRN CNP   aspirin 81 MG EC tablet Take 81 mg by mouth daily 2021 Yes Unknown, Entered By History   benzonatate (TESSALON) 200 MG capsule Take 1 capsule (200 mg) by mouth 3 times daily as needed for cough Past Week Yes Lynda Spann MD   cetirizine (ZYRTEC) 10 MG tablet Take 1 tablet (10 mg) by mouth daily 2021 Yes Lynda Spann MD   doxycycline hyclate (VIBRAMYCIN) 100 MG capsule Take 100 mg twice daily for five days if needed for treatment of a COPD exacerbation. Use alongside prednisone. Past Month Yes Lynda Spann MD   fluticasone-vilanterol (BREO ELLIPTA) 200-25 MCG/INH inhaler Inhale 1 puff into the lungs daily 2021 Yes Lynda Spann MD   gabapentin (NEURONTIN) 300 MG capsule TAKE 1 CAPSULE BY MOUTH AT BEDTIME 2021 at  am Yes Mitzi Nettles APRN CNP   glipiZIDE (GLUCOTROL XL) 2.5 MG 24 hr tablet Take 1 tablet (2.5 mg) by mouth daily 6/20/2021 Yes Mitzi Nettles APRN CNP   guaiFENesin (ROBITUSSIN) 20 mg/mL SOLN solution Take 10 mLs by mouth every 4 hours as needed for cough Past Month Yes Darius Aragon, DO   ipratropium - albuterol 0.5 mg/2.5 mg/3 mL (DUONEB) 0.5-2.5 (3) MG/3ML neb solution Take 1 vial (3 mLs) by nebulization every 6 hours as needed for shortness of breath / dyspnea or wheezing 6/22/2021 at am Yes Darius Aragon,    Lidocaine (SALONPAS PAIN RELIEVING) 4 % Patch Place onto the skin every 24 hours 6/16/2021 Yes Mitzi Nettles APRN CNP   nicotine (NICODERM CQ) 14 MG/24HR 24 hr patch Place 1 patch onto the skin every 24 hours 6/16/2021 Yes Mitzi Nettles APRN CNP   predniSONE (DELTASONE) 20 MG tablet Take 40 mg daily for 5 days if needed for treatment of a COPD exacerbation, use alongside doxycycline Past Month Yes Lynda Spann MD   sodium chloride (NEBUSAL) 3 % neb solution Take 3 mLs by nebulization daily Use 1-2 times daily in combination with Duoneb and Aerobika to help with mucus clearance Unknown Yes Lynda Spann MD   umeclidinium (INCRUSE ELLIPTA) 62.5 MCG/INH inhaler Inhale 1 puff into the lungs daily 6/22/2021 at am Yes Mitzi Nettles APRN CNP   vitamin D3 (CHOLECALCIFEROL) 50 mcg (2000 units) tablet Take by mouth daily Patient is unsure of dose but takes one tablet daily and things it is 50 mcg Past Week Yes Unknown, Entered By History       Date completed: 06/22/21    Medication history completed by: Alysa Walter

## 2021-06-24 LAB
ANION GAP SERPL CALCULATED.3IONS-SCNC: 2 MMOL/L (ref 3–14)
BASE EXCESS BLDV CALC-SCNC: 8.3 MMOL/L
BUN SERPL-MCNC: 15 MG/DL (ref 7–30)
CALCIUM SERPL-MCNC: 9.2 MG/DL (ref 8.5–10.1)
CHLORIDE SERPL-SCNC: 104 MMOL/L (ref 94–109)
CO2 SERPL-SCNC: 35 MMOL/L (ref 20–32)
CREAT SERPL-MCNC: 0.67 MG/DL (ref 0.52–1.04)
ERYTHROCYTE [DISTWIDTH] IN BLOOD BY AUTOMATED COUNT: 13 % (ref 10–15)
GFR SERPL CREATININE-BSD FRML MDRD: >90 ML/MIN/{1.73_M2}
GLUCOSE BLDC GLUCOMTR-MCNC: 175 MG/DL (ref 70–99)
GLUCOSE BLDC GLUCOMTR-MCNC: 183 MG/DL (ref 70–99)
GLUCOSE BLDC GLUCOMTR-MCNC: 187 MG/DL (ref 70–99)
GLUCOSE BLDC GLUCOMTR-MCNC: 200 MG/DL (ref 70–99)
GLUCOSE BLDC GLUCOMTR-MCNC: 255 MG/DL (ref 70–99)
GLUCOSE SERPL-MCNC: 167 MG/DL (ref 70–99)
HCO3 BLDV-SCNC: 35 MMOL/L (ref 21–28)
HCT VFR BLD AUTO: 45.1 % (ref 35–47)
HGB BLD-MCNC: 13.8 G/DL (ref 11.7–15.7)
MCH RBC QN AUTO: 28.2 PG (ref 26.5–33)
MCHC RBC AUTO-ENTMCNC: 30.6 G/DL (ref 31.5–36.5)
MCV RBC AUTO: 92 FL (ref 78–100)
O2/TOTAL GAS SETTING VFR VENT: ABNORMAL %
PCO2 BLDV: 58 MM HG (ref 40–50)
PH BLDV: 7.39 PH (ref 7.32–7.43)
PLATELET # BLD AUTO: 191 10E9/L (ref 150–450)
PO2 BLDV: 33 MM HG (ref 25–47)
POTASSIUM SERPL-SCNC: 4.8 MMOL/L (ref 3.4–5.3)
RBC # BLD AUTO: 4.9 10E12/L (ref 3.8–5.2)
SODIUM SERPL-SCNC: 141 MMOL/L (ref 133–144)
WBC # BLD AUTO: 9 10E9/L (ref 4–11)

## 2021-06-24 PROCEDURE — 250N000011 HC RX IP 250 OP 636: Performed by: STUDENT IN AN ORGANIZED HEALTH CARE EDUCATION/TRAINING PROGRAM

## 2021-06-24 PROCEDURE — 120N000002 HC R&B MED SURG/OB UMMC

## 2021-06-24 PROCEDURE — 258N000003 HC RX IP 258 OP 636: Performed by: STUDENT IN AN ORGANIZED HEALTH CARE EDUCATION/TRAINING PROGRAM

## 2021-06-24 PROCEDURE — 99232 SBSQ HOSP IP/OBS MODERATE 35: CPT | Mod: GC | Performed by: STUDENT IN AN ORGANIZED HEALTH CARE EDUCATION/TRAINING PROGRAM

## 2021-06-24 PROCEDURE — 82803 BLOOD GASES ANY COMBINATION: CPT | Performed by: STUDENT IN AN ORGANIZED HEALTH CARE EDUCATION/TRAINING PROGRAM

## 2021-06-24 PROCEDURE — 36415 COLL VENOUS BLD VENIPUNCTURE: CPT | Performed by: STUDENT IN AN ORGANIZED HEALTH CARE EDUCATION/TRAINING PROGRAM

## 2021-06-24 PROCEDURE — 999N001017 HC STATISTIC GLUCOSE BY METER IP

## 2021-06-24 PROCEDURE — 250N000012 HC RX MED GY IP 250 OP 636 PS 637: Performed by: STUDENT IN AN ORGANIZED HEALTH CARE EDUCATION/TRAINING PROGRAM

## 2021-06-24 PROCEDURE — 250N000009 HC RX 250: Performed by: STUDENT IN AN ORGANIZED HEALTH CARE EDUCATION/TRAINING PROGRAM

## 2021-06-24 PROCEDURE — 999N000157 HC STATISTIC RCP TIME EA 10 MIN

## 2021-06-24 PROCEDURE — 250N000013 HC RX MED GY IP 250 OP 250 PS 637: Performed by: STUDENT IN AN ORGANIZED HEALTH CARE EDUCATION/TRAINING PROGRAM

## 2021-06-24 PROCEDURE — 85027 COMPLETE CBC AUTOMATED: CPT | Performed by: STUDENT IN AN ORGANIZED HEALTH CARE EDUCATION/TRAINING PROGRAM

## 2021-06-24 PROCEDURE — 94640 AIRWAY INHALATION TREATMENT: CPT | Mod: 76

## 2021-06-24 PROCEDURE — 80048 BASIC METABOLIC PNL TOTAL CA: CPT | Performed by: STUDENT IN AN ORGANIZED HEALTH CARE EDUCATION/TRAINING PROGRAM

## 2021-06-24 RX ORDER — MULTIPLE VITAMINS W/ MINERALS TAB 9MG-400MCG
1 TAB ORAL DAILY
Status: DISCONTINUED | OUTPATIENT
Start: 2021-06-24 | End: 2021-06-25 | Stop reason: HOSPADM

## 2021-06-24 RX ORDER — IPRATROPIUM BROMIDE AND ALBUTEROL SULFATE 2.5; .5 MG/3ML; MG/3ML
3 SOLUTION RESPIRATORY (INHALATION) EVERY 4 HOURS
Status: DISCONTINUED | OUTPATIENT
Start: 2021-06-24 | End: 2021-06-25

## 2021-06-24 RX ORDER — LEVOFLOXACIN 750 MG/1
750 TABLET, FILM COATED ORAL DAILY
Status: DISCONTINUED | OUTPATIENT
Start: 2021-06-24 | End: 2021-06-25 | Stop reason: HOSPADM

## 2021-06-24 RX ORDER — BENZONATATE 100 MG/1
100 CAPSULE ORAL 3 TIMES DAILY PRN
Status: DISCONTINUED | OUTPATIENT
Start: 2021-06-24 | End: 2021-06-25 | Stop reason: HOSPADM

## 2021-06-24 RX ADMIN — AMLODIPINE BESYLATE 10 MG: 10 TABLET ORAL at 07:47

## 2021-06-24 RX ADMIN — IPRATROPIUM BROMIDE AND ALBUTEROL SULFATE 3 ML: .5; 3 SOLUTION RESPIRATORY (INHALATION) at 20:38

## 2021-06-24 RX ADMIN — LEVOFLOXACIN 750 MG: 750 TABLET, FILM COATED ORAL at 13:04

## 2021-06-24 RX ADMIN — ENOXAPARIN SODIUM 40 MG: 100 INJECTION SUBCUTANEOUS at 18:02

## 2021-06-24 RX ADMIN — IPRATROPIUM BROMIDE AND ALBUTEROL SULFATE 3 ML: .5; 3 SOLUTION RESPIRATORY (INHALATION) at 15:58

## 2021-06-24 RX ADMIN — NICOTINE 1 PATCH: 14 PATCH, EXTENDED RELEASE TRANSDERMAL at 21:06

## 2021-06-24 RX ADMIN — IPRATROPIUM BROMIDE AND ALBUTEROL SULFATE 3 ML: .5; 3 SOLUTION RESPIRATORY (INHALATION) at 11:47

## 2021-06-24 RX ADMIN — PREDNISONE 40 MG: 20 TABLET ORAL at 07:48

## 2021-06-24 RX ADMIN — BENZONATATE 100 MG: 100 CAPSULE, LIQUID FILLED ORAL at 23:30

## 2021-06-24 RX ADMIN — Medication 1 MG: at 21:55

## 2021-06-24 RX ADMIN — Medication 1 TABLET: at 18:02

## 2021-06-24 RX ADMIN — IPRATROPIUM BROMIDE AND ALBUTEROL SULFATE 3 ML: 2.5; .5 SOLUTION RESPIRATORY (INHALATION) at 02:25

## 2021-06-24 RX ADMIN — INSULIN ASPART 3 UNITS: 100 INJECTION, SOLUTION INTRAVENOUS; SUBCUTANEOUS at 18:42

## 2021-06-24 RX ADMIN — Medication 25 MCG: at 07:48

## 2021-06-24 RX ADMIN — GABAPENTIN 300 MG: 300 CAPSULE ORAL at 21:56

## 2021-06-24 RX ADMIN — INSULIN ASPART 2 UNITS: 100 INJECTION, SOLUTION INTRAVENOUS; SUBCUTANEOUS at 13:05

## 2021-06-24 RX ADMIN — CETIRIZINE HYDROCHLORIDE 10 MG: 10 TABLET, FILM COATED ORAL at 07:48

## 2021-06-24 RX ADMIN — IPRATROPIUM BROMIDE AND ALBUTEROL SULFATE 3 ML: .5; 3 SOLUTION RESPIRATORY (INHALATION) at 23:40

## 2021-06-24 RX ADMIN — GUAIFENESIN 10 ML: 100 SOLUTION ORAL at 18:02

## 2021-06-24 RX ADMIN — FLUTICASONE FUROATE AND VILANTEROL TRIFENATATE 1 PUFF: 200; 25 POWDER RESPIRATORY (INHALATION) at 16:09

## 2021-06-24 RX ADMIN — INSULIN ASPART 1 UNITS: 100 INJECTION, SOLUTION INTRAVENOUS; SUBCUTANEOUS at 08:57

## 2021-06-24 RX ADMIN — SODIUM CHLORIDE, POTASSIUM CHLORIDE, SODIUM LACTATE AND CALCIUM CHLORIDE 500 ML: 600; 310; 30; 20 INJECTION, SOLUTION INTRAVENOUS at 11:41

## 2021-06-24 RX ADMIN — GUAIFENESIN 10 ML: 100 SOLUTION ORAL at 07:55

## 2021-06-24 RX ADMIN — UMECLIDINIUM 1 PUFF: 62.5 AEROSOL, POWDER ORAL at 16:08

## 2021-06-24 RX ADMIN — BENZONATATE 100 MG: 100 CAPSULE, LIQUID FILLED ORAL at 16:14

## 2021-06-24 RX ADMIN — IPRATROPIUM BROMIDE AND ALBUTEROL SULFATE 3 ML: 2.5; .5 SOLUTION RESPIRATORY (INHALATION) at 07:42

## 2021-06-24 RX ADMIN — BENZONATATE 100 MG: 100 CAPSULE, LIQUID FILLED ORAL at 04:05

## 2021-06-24 ASSESSMENT — ACTIVITIES OF DAILY LIVING (ADL)
ADLS_ACUITY_SCORE: 17

## 2021-06-24 NOTE — PLAN OF CARE
Appears to be breathing better this evening.  Very wheezy and tight this am.  RUSSELL.  Nebs increased to q4 hours and seems to help.  Tessalon and guaifenesin given for persistent cough.  LR bolus given per orders.  Transitioned to oral Levaquin.  Up with SBA and walker to the the [facility].  Plan to shower this evening after dinner.  Glucoses elevated today, 255 at 6pm.

## 2021-06-24 NOTE — PROGRESS NOTES
"CLINICAL NUTRITION SERVICES - ASSESSMENT NOTE     Nutrition Prescription    RECOMMENDATIONS FOR MDs/PROVIDERS TO ORDER:  --Recommend ordering a MVI with minerals supplement (Thera-vit-M).   --Encourage small, frequent nutrient-dense meals/snacks.     Malnutrition Status:    Severe malnutrition in the context of acute on chronic illness    Recommendations already ordered by Registered Dietitian (RD):  --Ensure Enlive (chocolate) TID meals.     Future/Additional Recommendations:  --PO intake, supplement tolerance.  --Weight trends.  --Need for kcal counts.      REASON FOR ASSESSMENT  Jenn Aparicio is a/an 66 year old female assessed by the dietitian for Admission Nutrition Risk Screen for positive for unsure weight loss and decreased appetite.     NUTRITION HISTORY  PMH COPD (2L O2, FEV1<24% from PFT's in 12/2020), adenocarcinoma of lung s/p lobectomy (2/2016), suspected NSCLC s/p radiation therapy (1/2020), tobacco use, T2DM and HTN who presents presents with shortness of breath and chest pain.    Briefly visited with pt this afternoon, as pt was napping. Pt reports appetite has been poor for a few months, worse with \"filling up\" in her chest. Pt denies dysphagia but confirms increase in SOB has reduced appetite and made it harder to eat. Pt says she has been eating ~1 meal/day. She recently bought Ensure/Boost for home and agreed to chocolate while admitted to help supplement intake.     CURRENT NUTRITION ORDERS  Diet: Regular  Intake/Tolerance: no intake recorded    LABS  -319  A1c 7.0% (2/24/2021)    MEDICATIONS  Glipizide (on hold)  Medium sliding scale insulin  Prednisone  Vitamin D3 (25 mcg daily)    ANTHROPOMETRICS  Height: 167.6 cm (5' 6\")  Most Recent Weight: 81.6 kg (180 lb)    IBW: 59.1 kg (138% IBW)  BMI: Overweight BMI 25-29.9  Weight History: 13% wt loss x 4 months - significant  Wt Readings from Last 30 Encounters:   06/22/21 81.6 kg (180 lb)   03/30/21 90.7 kg (200 lb)   02/26/21 93.8 kg (206 lb " 12.7 oz)   09/08/20 93.6 kg (206 lb 4.8 oz)   08/13/15 73.6 kg (162 lb 3.2 oz)   08/06/15 73.8 kg (162 lb 9.6 oz)   03/14/11 78.3 kg (172 lb 9.6 oz)     Dosing Weight: 65 kg adjusted based on IBW (59.1 kg) and admission weight (81.6 kg)    ASSESSED NUTRITION NEEDS  Estimated Energy Needs: 7203-4100+ kcals/day (25 - 30+ kcals/kg)  Justification: Repletion  Estimated Protein Needs: 78-98 grams protein/day (1.2 - 1.5 grams of pro/kg)  Justification: Increased needs and Repletion  Estimated Fluid Needs: (1 mL/kcal)   Justification: Maintenance and Per provider pending fluid status    PHYSICAL FINDINGS  See malnutrition section below.    MALNUTRITION  % Intake: </=75% for >/= 1 month (severe)  % Weight Loss: > 7.5% in 3 months (severe)  Subcutaneous Fat Loss: Facial region:  Moderate on visual inspection  Muscle Loss: Temporal:  moderate and Thoracic region (clavicle, acromium bone, deltoid, trapezius, pectoral):  Mild to moderate on visual inspection  Fluid Accumulation/Edema: Does not meet criteria, trace 1+ edema  Malnutrition Diagnosis: Severe malnutrition in the context of acute on chronic illness    NUTRITION DIAGNOSIS  Inadequate oral intake related to poor appetite, SOB, suspected hypermetabolism 2/2 diagnosis as evidenced by pt report of usual intake, severe 13% weight loss x 4 months.     INTERVENTIONS  Implementation  Nutrition education for recommended modifications   Medical food supplement therapy     Goals  Patient to consume % of nutritionally adequate meal trays TID, or the equivalent with supplements/snacks.     Monitoring/Evaluation  Progress toward goals will be monitored and evaluated per protocol.    Soha Benitez, MS, RD, LD, [facility]  Pager [phone]

## 2021-06-24 NOTE — PLAN OF CARE
Pt admitted with COPD exacerbation   Neuro: A&Ox4.   Cardiac: VSS.   Respiratory: Sating WNL on 3 liters NC. Receiving scheduled dounebs. Breath sounds diminished.  GI/: Adequate urine output. Denies BM.  Diet/appetite: Regular diet ordered.  Pain: Denies  Skin: No deficits noted.  LDA's: PIV NS locked.    Plan: Continue with POC. Notify primary team with changes.

## 2021-06-24 NOTE — PLAN OF CARE
"Blood pressure 136/58, pulse 109, temperature 98.2  F (36.8  C), temperature source Oral, resp. rate 20, height 1.676 m (5' 6\"), weight 81.6 kg (180 lb), SpO2 95 %, not currently breastfeeding.    AVSS. Pt denied pain. Sats 95% on 3L NC. Good po intake. Pt up with SBA to [facility],voiding spontaneously. LS wheezing  and received schedule neb as ordered.  and 319. Continue to monitor BS closely and notify primary team with any changes.    Problem: Breathing Pattern Ineffective  Goal: Effective Breathing Pattern  Intervention: Promote Improved Breathing Pattern  Recent Flowsheet Documentation  Taken 6/23/2021 1600 by Blanca Hernandez RN  Head of Bed (HOB) Positioning: HOB at 20-30 degrees     Problem: Adult Inpatient Plan of Care  Goal: Absence of Hospital-Acquired Illness or Injury  Intervention: Prevent Skin Injury  Recent Flowsheet Documentation  Taken 6/23/2021 1600 by Blanca Hernandez, RN  Body Position: position changed independently       "

## 2021-06-24 NOTE — PROGRESS NOTES
[facility]    Family Medicine Progress Note - [place]       Main Plans for Today   - O2 NC to baseline (2L), encourage up with assist  - Continue Levaquin Day 3/5, switch to PO  - Continue Prednisone Day 3/7  - start Breo Ellipta, Incruse Ellipta daily  - Duonebs scheduled q4hr  - 500 mL LR Bolus and encourage PO intake  - Holding glipizide, medium sliding scale insulin  - Nutrition to consult  - Robitussin or Tessalon PRN for cough    Assessment & Plan   Jenn is a 66 year old female with a history of severe COPD, adenocarcinoma of lung s/p lobectomy (2/2016), suspected NSCLC s/p radiation therapy (1/2020), tobacco use disorder, T2DM and HTN admitted for COPD exacerbation management.    # COPD exacerbation  # Shortness of breath  # hypoxia, SpO2 75% resolved  # tachycardia, resolved  # cough  # adenocarcinoma of lung s/p lobectomy (2/2016)  # suspected NSCLC s/p radiation therapy (1/2020)  FEV1<24% in 12/2020. Baseline 2L O2. Has steroids and abx for outpatient exacerbation but did not try prior to arrival. Most likely to be exacerbation of the patient's COPD and we are managing according to GOLD Criteria. She was admitted with similar presentation 2/21 and was seen by COPD team; prescribed Incruse Ellipta on discharge but did not start. RSV Panel negative 6/23/21.  - O2 currently sat 96% on 2L.  - Prednisone 40mg daily 7 days 6/22 (Received Solu-medrol per EMS on 6/22, not documented in MAR)   - Levaquin 750mg for 5 days 6/22 -   - Duoneb q4hr scheduled  - Continuous pulse ox  - Vitals Q8hrs  - Daily CBCs  - PTA Cetirizine 10mg daily  - Pharmacy liaison consulted regarding outpatient coverage. Patient was previously covered and had previously been prescribed breo and incruse inhalers but never started. Start the patient on Incruse Ellipta and PTA Breo Ellipta today  - Plan to complete Ambulatory oxygen monitoring to evaluate O2 for possible new base home  O2.  - Reach out to care coordinator to see if she qualifies for outpatient care coordination, rides, etc.  - Will write letter for housing  - Robitussin or Tessalon PRN for cough    #Chest pain  #Pleuritic pain  Similar to previous chest pain associated w/ previous COPD flares. Negative ACS work up completed including Troponin x2 negative, EKG w/ sinus tachycardia.  Likely pleuritic given positional nature of pain, worsening w/ cough and reproducible pain on palpation. Per chart review, has CKD3 diagnosis but unable to find corresponding labs; GFR>90 on admission so feel it is safe to give NSAIDs.  - daily BMP  - Ibuprofen 600 mg q6hrs as needed for pain.      # Poor appetite  # Severe Malnutrition -  in the context of acute on chronic illness  Patient endorses poor oral intake of food and water since her stay. More due to her symptoms of cough.   - 500 ml LR bolus. Will re-evaluate in afternoon.  - Multivitamin  - Nutrition consult, appreciate recs   Recommendations already ordered by Registered Dietitian (RD):   --Ensure Enlive (chocolate) TID meals.       Future/Additional Recommendations:   --PO intake, supplement tolerance.   --Weight trends.   --Need for kcal counts.     Chronic:  #Presumed DAVID  - will refer for sleep study at time of discharge     #HTN  Hypertensive on admission.  - continue PTA amlodipine 10 mg     #T2DM  Last A1c from 2/2021 7.0.   - HOLD PTA glipizide  - hypoglycemia protocol; hypoglycemia known side effect of Levofloxacin  - Medium sliding scale insulin     #Tobacco Use  Hx of tobacco use.  - Continue PTA Nicotine patches 14 mg q24hrs     # chronic neck pain  - PTA Gabapentin 300mg qhs    # Hepatitis C, cured  Genotype 1a. Was treated at [facility] 2010 with viral suppression. Negative PCR quant 10/2019    # Pain Assessment:  Current Pain Score 6/24/2021   Patient currently in pain? denies   - Jenn is experiencing pain due to COPD exacerbation. Pain management was discussed and the plan was  created in a collaborative fashion.  Jenn's response to the current recommendations: Adherent    Diet: Combination Diet Regular Diet Adult  Snacks/Supplements Adult: Ensure Enlive; With Meals  Fluids: PO  DVT Prophylaxis: Enoxaparin (Lovenox) subcutaneous (PADUA 4)  Code Status: Full Code    Disposition Plan   Expected discharge: Tomorrow, recommended to prior living arrangement once  once antibiotic plan established, O2 normalized to baseline/new level established, and COPD well controlled .     The patient's care was discussed with the Attending Physician, Dr. Jos Boyer .    Zacharylea Ventura  MS4  [facility]  Pager: [phone]  Please see sticky note for cross cover information    I was present with the medical student who participated in the service and in the documentation of this note. I have verified the history and personally performed the physical exam and medical decision making. I have verified and edited the note, which accurately reflects my assessment of the patient and the plan of care.    Cleopatra Atkinson, DO   she/her  PGY-1    Interval History   No acute events overnight. Patient reports sleep was difficult due to her cough. Reports continued cough w/ sputum and chest pain, to the point of almost passing out today. Breathing has improved and patient just recently weaned to her baseline 2 L. Reports ongoing weakness and fatigue. Eating has been minimal due primarily to her cough.     Physical Exam   Vital Signs: Temp: 98.2  F (36.8  C) Temp src: Oral BP: 117/51 Pulse: 110   Resp: 20 SpO2: 95 % O2 Device: Nasal cannula Oxygen Delivery: 2 LPM  Weight: 180 lbs 0 oz  General Appearance: No acute distress but clearly uncomfortable  Respiratory: Expiratory wheezing throughout, w/ exception to RLL given lobectomy. Noted wet cough.  Cardiovascular: Regular rate and rhythm. Normal S1 and S2. No murmurs.  Chest: Tenderness, reproducible sternal tenderness  GI: Flat, soft,  non-tender.  Skin: Warm and dry  Neurological: A&Ox3. Normal behavior.    Data   Recent Labs   Lab 06/24/21  0731 06/23/21  0626 06/22/21  1804 06/22/21  1258   WBC 9.0 9.0  --  9.9   HGB 13.8 14.3  --  15.1   MCV 92 91  --  91    196  --  180    138  --  135   POTASSIUM 4.8 4.4  --  4.5   CHLORIDE 104 102  --  100   CO2 35* 30  --  31   BUN 15 12  --  11   CR 0.67 0.56  --  0.49*   ANIONGAP 2* 5  --  3   LUIGI 9.2 9.2  --  8.9   * 185*  --  156*   TROPI  --   --  <0.015 <0.015     No results found for this or any previous visit (from the past 24 hour(s)).

## 2021-06-24 NOTE — CONSULTS
Care Management Initial Consult    General Information  Assessment completed with: chart review    Type of CM/SW Visit: Chart Assessment  Reason for Consult: discharge planning  Advance Care Planning: none on file           Communication Assessment  Patient's communication style:   spoken language (English or Bilingual)    Hearing Difficulty or Deaf: no   Wear Glasses or Blind: yes      Cognitive  Cognitive/Neuro/Behavioral: WDL                        Living Environment:   People in home: alone     Current living Arrangements: apartment      Able to return to prior arrangements: yes       Family/Social Support:  Care provided by: self  Provides care for: no one, unable/limited ability to care for self  Marital Status: Single  Description of Support System:  Adult Children               Current Resources:   Patient receiving home care services: No  Community Resources: DME, County Programs/Worker, PCA (unknown # of hours)  Equipment currently used at home: shower chair, walker rolling  Supplies currently used at home: Nebulizer tubing, Oxygen Tubing/Supplies  Discharge transportation: [employer] Ride: [phone] or [facility] Transportation w/c ride w/portable O2: [phone]      Employment/Financial:  Employment Status: disabled         Lifestyle & Psychosocial Needs:  Socioeconomic History     Marital status: Single     Spouse name: Not on file     Number of children: Not on file     Years of education: Not on file     Highest education level: Not on file     Tobacco Use     Smoking status: Current Every Day Smoker     Packs/day: 0.25     Types: Cigarettes     Smokeless tobacco: Former User     Tobacco comment: 2/24/21 Patient has quit as of 2/12/21. Patient will be considered former smoker if she remains smoke-free for 1 month   Substance and Sexual Activity     Alcohol use: Yes     Comment: sometime     Drug use: No     Sexual activity: Not Currently     Partners: Male         Additional Information:  Patient  is on home oxygen through [facility] 2L baseline. New nebulizer machine was delivered by  [facility] last hospital discharge, February 2021.     DME:   [facility] (new nebulizer)  Phone: [phone]     [facility] (PTA Home O2 2L)  Phone: [phone]  Fax: [phone]     Discharge transportation: [employer] Ride: [phone] or  [facility] w/c ride w/portable O2: [phone]      Nguyen Luna RN, BSN, PHN  Care Coordinator  [facility]  Direct phone: [phone]  Pager: [phone]    To contact the on-call Weekend Care Coordination Team please page [phone]

## 2021-06-25 ENCOUNTER — APPOINTMENT (OUTPATIENT)
Dept: PHYSICAL THERAPY | Facility: CLINIC | Age: 66
DRG: 193 | End: 2021-06-25
Attending: STUDENT IN AN ORGANIZED HEALTH CARE EDUCATION/TRAINING PROGRAM
Payer: COMMERCIAL

## 2021-06-25 ENCOUNTER — APPOINTMENT (OUTPATIENT)
Dept: OCCUPATIONAL THERAPY | Facility: CLINIC | Age: 66
DRG: 193 | End: 2021-06-25
Attending: STUDENT IN AN ORGANIZED HEALTH CARE EDUCATION/TRAINING PROGRAM
Payer: COMMERCIAL

## 2021-06-25 VITALS
SYSTOLIC BLOOD PRESSURE: 147 MMHG | HEIGHT: 66 IN | OXYGEN SATURATION: 93 % | RESPIRATION RATE: 18 BRPM | DIASTOLIC BLOOD PRESSURE: 64 MMHG | WEIGHT: 180 LBS | TEMPERATURE: 97.1 F | HEART RATE: 99 BPM | BODY MASS INDEX: 28.93 KG/M2

## 2021-06-25 LAB
ANION GAP SERPL CALCULATED.3IONS-SCNC: 4 MMOL/L (ref 3–14)
BUN SERPL-MCNC: 16 MG/DL (ref 7–30)
CALCIUM SERPL-MCNC: 8.9 MG/DL (ref 8.5–10.1)
CHLORIDE SERPL-SCNC: 103 MMOL/L (ref 94–109)
CO2 SERPL-SCNC: 34 MMOL/L (ref 20–32)
CREAT SERPL-MCNC: 0.66 MG/DL (ref 0.52–1.04)
ERYTHROCYTE [DISTWIDTH] IN BLOOD BY AUTOMATED COUNT: 13.2 % (ref 10–15)
GFR SERPL CREATININE-BSD FRML MDRD: >90 ML/MIN/{1.73_M2}
GLUCOSE BLDC GLUCOMTR-MCNC: 165 MG/DL (ref 70–99)
GLUCOSE BLDC GLUCOMTR-MCNC: 321 MG/DL (ref 70–99)
GLUCOSE SERPL-MCNC: 197 MG/DL (ref 70–99)
HCT VFR BLD AUTO: 42.3 % (ref 35–47)
HGB BLD-MCNC: 12.9 G/DL (ref 11.7–15.7)
MCH RBC QN AUTO: 28 PG (ref 26.5–33)
MCHC RBC AUTO-ENTMCNC: 30.5 G/DL (ref 31.5–36.5)
MCV RBC AUTO: 92 FL (ref 78–100)
PLATELET # BLD AUTO: 169 10E9/L (ref 150–450)
POTASSIUM SERPL-SCNC: 3.7 MMOL/L (ref 3.4–5.3)
RBC # BLD AUTO: 4.61 10E12/L (ref 3.8–5.2)
SODIUM SERPL-SCNC: 141 MMOL/L (ref 133–144)
WBC # BLD AUTO: 7.3 10E9/L (ref 4–11)

## 2021-06-25 PROCEDURE — 94640 AIRWAY INHALATION TREATMENT: CPT | Mod: 76

## 2021-06-25 PROCEDURE — 999N001017 HC STATISTIC GLUCOSE BY METER IP

## 2021-06-25 PROCEDURE — 85027 COMPLETE CBC AUTOMATED: CPT | Performed by: STUDENT IN AN ORGANIZED HEALTH CARE EDUCATION/TRAINING PROGRAM

## 2021-06-25 PROCEDURE — 97165 OT EVAL LOW COMPLEX 30 MIN: CPT | Mod: GO | Performed by: OCCUPATIONAL THERAPIST

## 2021-06-25 PROCEDURE — 97161 PT EVAL LOW COMPLEX 20 MIN: CPT | Mod: GP

## 2021-06-25 PROCEDURE — 250N000013 HC RX MED GY IP 250 OP 250 PS 637: Performed by: STUDENT IN AN ORGANIZED HEALTH CARE EDUCATION/TRAINING PROGRAM

## 2021-06-25 PROCEDURE — 97535 SELF CARE MNGMENT TRAINING: CPT | Mod: GO | Performed by: OCCUPATIONAL THERAPIST

## 2021-06-25 PROCEDURE — 80048 BASIC METABOLIC PNL TOTAL CA: CPT | Performed by: STUDENT IN AN ORGANIZED HEALTH CARE EDUCATION/TRAINING PROGRAM

## 2021-06-25 PROCEDURE — 97530 THERAPEUTIC ACTIVITIES: CPT | Mod: GP

## 2021-06-25 PROCEDURE — 999N000157 HC STATISTIC RCP TIME EA 10 MIN

## 2021-06-25 PROCEDURE — 250N000012 HC RX MED GY IP 250 OP 636 PS 637: Performed by: STUDENT IN AN ORGANIZED HEALTH CARE EDUCATION/TRAINING PROGRAM

## 2021-06-25 PROCEDURE — 99239 HOSP IP/OBS DSCHRG MGMT >30: CPT | Mod: GC | Performed by: STUDENT IN AN ORGANIZED HEALTH CARE EDUCATION/TRAINING PROGRAM

## 2021-06-25 PROCEDURE — 250N000009 HC RX 250: Performed by: STUDENT IN AN ORGANIZED HEALTH CARE EDUCATION/TRAINING PROGRAM

## 2021-06-25 PROCEDURE — 94640 AIRWAY INHALATION TREATMENT: CPT

## 2021-06-25 PROCEDURE — 36415 COLL VENOUS BLD VENIPUNCTURE: CPT | Performed by: STUDENT IN AN ORGANIZED HEALTH CARE EDUCATION/TRAINING PROGRAM

## 2021-06-25 RX ORDER — LEVOFLOXACIN 750 MG/1
750 TABLET, FILM COATED ORAL DAILY
Qty: 1 TABLET | Refills: 0 | Status: SHIPPED | OUTPATIENT
Start: 2021-06-26 | End: 2021-06-27

## 2021-06-25 RX ORDER — IPRATROPIUM BROMIDE AND ALBUTEROL SULFATE 2.5; .5 MG/3ML; MG/3ML
3 SOLUTION RESPIRATORY (INHALATION)
Status: DISCONTINUED | OUTPATIENT
Start: 2021-06-25 | End: 2021-06-25 | Stop reason: HOSPADM

## 2021-06-25 RX ORDER — PREDNISONE 20 MG/1
40 TABLET ORAL DAILY
Qty: 14 TABLET | Refills: 0 | Status: SHIPPED | OUTPATIENT
Start: 2021-06-26 | End: 2021-07-03

## 2021-06-25 RX ORDER — MULTIPLE VITAMINS W/ MINERALS TAB 9MG-400MCG
1 TAB ORAL DAILY
Qty: 30 TABLET | Refills: 0 | Status: SHIPPED | OUTPATIENT
Start: 2021-06-26 | End: 2022-04-20

## 2021-06-25 RX ORDER — FEEDER CONTAINER WITH PUMP SET
1 EACH MISCELLANEOUS
Qty: 21330 ML | Refills: 0 | Status: SHIPPED | OUTPATIENT
Start: 2021-06-25 | End: 2022-04-20

## 2021-06-25 RX ADMIN — AMLODIPINE BESYLATE 10 MG: 10 TABLET ORAL at 08:37

## 2021-06-25 RX ADMIN — FLUTICASONE FUROATE AND VILANTEROL TRIFENATATE 1 PUFF: 200; 25 POWDER RESPIRATORY (INHALATION) at 08:38

## 2021-06-25 RX ADMIN — PREDNISONE 40 MG: 20 TABLET ORAL at 08:37

## 2021-06-25 RX ADMIN — INSULIN ASPART 1 UNITS: 100 INJECTION, SOLUTION INTRAVENOUS; SUBCUTANEOUS at 08:38

## 2021-06-25 RX ADMIN — IPRATROPIUM BROMIDE AND ALBUTEROL SULFATE 3 ML: .5; 3 SOLUTION RESPIRATORY (INHALATION) at 09:27

## 2021-06-25 RX ADMIN — UMECLIDINIUM 1 PUFF: 62.5 AEROSOL, POWDER ORAL at 08:38

## 2021-06-25 RX ADMIN — Medication 25 MCG: at 08:38

## 2021-06-25 RX ADMIN — Medication 1 TABLET: at 08:38

## 2021-06-25 RX ADMIN — BENZONATATE 100 MG: 100 CAPSULE, LIQUID FILLED ORAL at 11:02

## 2021-06-25 RX ADMIN — LEVOFLOXACIN 750 MG: 750 TABLET, FILM COATED ORAL at 08:37

## 2021-06-25 RX ADMIN — CETIRIZINE HYDROCHLORIDE 10 MG: 10 TABLET, FILM COATED ORAL at 08:37

## 2021-06-25 RX ADMIN — INSULIN ASPART 4 UNITS: 100 INJECTION, SOLUTION INTRAVENOUS; SUBCUTANEOUS at 12:35

## 2021-06-25 RX ADMIN — IPRATROPIUM BROMIDE AND ALBUTEROL SULFATE 3 ML: .5; 3 SOLUTION RESPIRATORY (INHALATION) at 04:05

## 2021-06-25 ASSESSMENT — ACTIVITIES OF DAILY LIVING (ADL)
ADLS_ACUITY_SCORE: 17
PREVIOUS_RESPONSIBILITIES: MEAL PREP;HOUSEKEEPING;LAUNDRY;SHOPPING;MEDICATION MANAGEMENT;FINANCES
ADLS_ACUITY_SCORE: 17
ADLS_ACUITY_SCORE: 17

## 2021-06-25 NOTE — PROGRESS NOTES
"   06/25/21 1500   Quick Adds   Type of Visit Initial Occupational Therapy Evaluation   Living Environment   People in home alone   Current Living Arrangements apartment   Home Accessibility other (see comments)  (excessive hallway length to apt. )   Transportation Anticipated family or friend will provide   Self-Care   Usual Activity Tolerance moderate   Current Activity Tolerance fair   Equipment Currently Used at Home cane, straight;dressing device;grab bar, toilet;grab bar, tub/shower;shower chair;walker, rolling   Activity/Exercise/Self-Care Comment pt would beneift from a power [place] 2/2 decreased activity tolerance. Pt having difficulty with taking out trash and getting in and out of apt.    Disability/Function   Hearing Difficulty or Deaf no   Wear Glasses or Blind yes   Vision Management glasses   Concentrating, Remembering or Making Decisions Difficulty no   Difficulty Communicating no   Walking or Climbing Stairs Difficulty yes   Walking or Climbing Stairs ambulation difficulty, requires equipment   Dressing/Bathing Difficulty yes   Dressing/Bathing bathing difficulty, requires equipment   Toileting issues yes   Toileting Management difficulty wiping self and maintaining normal breathing pattern.    Doing Errands Independently Difficulty (such as shopping) no   Fall history within last six months yes   Number of times patient has fallen within last six months 2   Change in Functional Status Since Onset of Current Illness/Injury yes   General Information   Referring Physician Patricia Dobbins   Patient/Family Therapy Goal Statement (OT) \"I would love to have a scooter, it's too much for me to walk, carry my purse and oxygen tank\" \"I often cannot wlak down the hallway to and from my apt\"   Additional Occupational Profile Info/Pertinent History of Current Problem Jenn is a 66 year old female w/ a pmhx of COPD (2L O2, FEV1<24% from PFT's in 12/2020), adenocarcinoma of lung s/p lobectomy (2/2016), suspected " NSCLC s/p radiation therapy (1/2020), tobacco use, T2DM and HTN who presents presents with shortness of breath and chest pain.   Existing Precautions/Restrictions oxygen therapy device and L/min  (4LPM)   General Observations and Info pt very motivated in therapy and appears distressed about her deficits.    Cognitive Status Examination   Orientation Status orientation to person, place and time   Cognitive Status Comments no concerns.    Visual Perception   Visual Impairment/Limitations WFL   Sensory   Sensory Quick Adds No deficits were identified   Range of Motion Comprehensive   General Range of Motion no range of motion deficits identified   Comment, General Range of Motion B UE WFL.    Strength Comprehensive (MMT)   General Manual Muscle Testing (MMT) Assessment other (see comments)   Comment, General Manual Muscle Testing (MMT) Assessment overall decondioning.    Coordination   Coordination Comments no new concerns.    Bed Mobility   Bed Mobility supine-sit   Supine-Sit [place] (Bed Mobility) modified independence   Transfers   Transfers toilet transfer   Toilet Transfer   [place] Level (Toilet Transfer) modified independence  (with fatigue. )   Instrumental Activities of Daily Living (IADL)   Previous Responsibilities meal prep;housekeeping;laundry;shopping;medication management;finances   IADL Comments pt would benefit from PCA cares to assist wiht housecleaning as pt unable to complete without risk of fall or overfatigue.    Clinical Impression   Criteria for Skilled Therapeutic Interventions Met (OT) yes   OT Diagnosis decreased ADL I   Assessment of Occupational Performance 5 or more Performance Deficits   Identified Performance Deficits dressing, bathing, toileting, home making, leisure.    Planned Therapy Interventions (OT) ADL retraining;IADL retraining;strengthening;transfer training;home program guidelines;progressive activity/exercise;risk factor education   Clinical Decision Making  Complexity (OT) low complexity   Therapy Frequency (OT) 5x/week   Predicted Duration of Therapy 1 week   Risk & Benefits of therapy have been explained evaluation/treatment results reviewed;care plan/treatment goals reviewed;risks/benefits reviewed;current/potential barriers reviewed;participants voiced agreement with care plan;participants included;patient   Comment-Clinical Impression pt presents to OT with worsening endurance, deconditioning and at risk of further injury. Pt to benefit from increased PCA cares, home OT safety evaluation and electric scooter to increase mobility as well as ADL/IADL I.    OT Discharge Planning    OT Discharge Recommendation (DC Rec) Home with assist;home with home care occupational therapy   OT Rationale for DC Rec pt with decrease in ADL/IADL I and will beneift from compensations as well as modifications to home.    OT Brief overview of current status  pt unable to go to hospital BR on 4L NC without fatigue and SOB.     Total Evaluation Time (Minutes)   Total Evaluation Time (Minutes) 5

## 2021-06-25 NOTE — PROGRESS NOTES
Care Management Follow Up    Length of Stay (days): 3    Expected Discharge Date: 06/25/21     Concerns to be Addressed:       Patient plan of care discussed at interdisciplinary rounds: Yes    Anticipated Discharge Disposition:  Home with family    Anticipated Discharge Services:  TCU recommended but patient declined. She first declined home care but now is agreeable  Anticipated Discharge DME:  Had home O2 PTA with [facility]. Reports she has a transport tank here.    Patient/family educated on Medicare website which has current facility and service quality ratings: No, has had [facility] in the past so requested them  Education Provided on the Discharge Plan:  Yes  Patient/Family in Agreement with the Plan:  Yes    Referrals Placed by CM/SW:  Home Care  Private pay costs discussed: Not applicable    Additional Information:  Per Maryse's team, patient is ready for discharge this afternoon. PT saw and recommended TCU but patient declined. This RNCC called patient to discuss having home PT. She declined initially but then said she would be OK with it reports that she has had from [facility] in the past and would like again. Order and F2F placed by provider. Patient reports that her daughter will be picking her up and staying with her. She did say that she may end up going to her daughter's home if it is too difficult for her at home. Reports that her daughter lives in [place]. Discussed that if that happens, [employer] may not be able to see her. She verbalized understanding.       Ruben Rivera, RN care coordinator [phone]

## 2021-06-25 NOTE — PLAN OF CARE
Patient has been assessed for Home Oxygen needs. Oxygen readings:    *Pulse oximetry (SpO2) = n/a % on room air at rest while awake.    *SpO2 improved to 92% on 2 liters/minute at rest.    *SpO2 = n/a % on room air during activity/with exercise.    *SpO2 improved to 90% on 2 liters/minute during activity/with exercise.    *Baseline utilizes 2LPM  *HR response with activity on 2LPM: 138bpm  *HR response with activity on 3LPM: 117bpm

## 2021-06-25 NOTE — PROGRESS NOTES
06/25/21 1035   Quick Adds   Type of Visit Initial PT Evaluation   Living Environment   People in home alone   Current Living Arrangements apartment   Home Accessibility no concerns   Transportation Anticipated family or friend will provide;health plan transportation   Living Environment Comments Pt lives alone; has family nearby that help out during the week, but not always available d/t their childcare. Apt on first floor, no accessibility concerns. Pt could potentially stay with daughter in apt that is accessible w/ elevator.    Self-Care   Usual Activity Tolerance moderate   Current Activity Tolerance fair   Regular Exercise Yes   Activity/Exercise Type strength training  (HEP and bike)   Exercise Amount/Frequency daily   Equipment Currently Used at Home shower chair;walker, rolling;commode chair;grab bar, toilet;grab bar, tub/shower;cane, straight  (2LPM home O2)   Activity/Exercise/Self-Care Comment Pt reports significant limits in activity tolerance. Relies on equipment for mobility and ADLs.    Disability/Function   Hearing Difficulty or Deaf no   Wear Glasses or Blind yes   Vision Management glasses   Concentrating, Remembering or Making Decisions Difficulty no   Difficulty Communicating no   Difficulty Eating/Swallowing yes   Eating/Swallowing swallowing liquids;swallowing solid food   Eating/Swallowing Management smaller sips and softer foods   Walking or Climbing Stairs Difficulty yes   Walking or Climbing Stairs ambulation difficulty, requires equipment  (Furniture surfs in apt)   Mobility Management Cane use at home, requires walker in public   Dressing/Bathing Difficulty yes   Dressing/Bathing bathing difficulty, requires equipment;dressing difficulty, requires equipment  (shower chair/grab bars, sock grabber)   Dressing/Bathing Management Kids occasionally help with ADLs when they are present   Toileting issues yes   Toileting Assistance toileting difficulty, requires equipment   Doing Errands  "Independently Difficulty (such as shopping) yes   Errands Management family   Fall history within last six months yes   Number of times patient has fallen within last six months 2  (mentions \"legs gave out\" after getting dizzy)   Change in Functional Status Since Onset of Current Illness/Injury yes   General Information   Onset of Illness/Injury or Date of Surgery 06/22/21   Referring Physician Patricia Dobbins MD   Patient/Family Therapy Goals Statement (PT) Return home   Pertinent History of Current Problem (include personal factors and/or comorbidities that impact the POC) Jenn is a 66 year old female w/ a pmhx of COPD (2L O2, FEV1<24% from PFT's in 12/2020), adenocarcinoma of lung s/p lobectomy (2/2016), suspected NSCLC s/p radiation therapy (1/2020), tobacco use, T2DM and HTN who presents presents with shortness of breath and chest pain.   Existing Precautions/Restrictions oxygen therapy device and L/min;fall   General Observations Up with assist   Cognition   Orientation Status (Cognition) oriented x 3   Pain Assessment   Patient Currently in Pain Yes, see Vital Sign flowsheet   Integumentary/Edema   Integumentary/Edema Comments Mild non-pitting edema BLE   Posture    Posture Forward head position;Protracted shoulders   Range of Motion (ROM)   ROM Quick Adds ROM WFL   Strength   Manual Muscle Testing Quick Adds Strength WFL   Bed Mobility   Comment (Bed Mobility) Pt rolls IND and transfers to EOB IND   Transfers   Transfer Safety Comments Pt transfers sit<>stand IND   Gait/Stairs (Locomotion)   Comment (Gait/Stairs) Pt ambulates 25 feet with FWW and SBA on 2L O2 via nc, SP02 >90%, HR peaking at 138. Exhibits slow, shuffling gait and fatigues quickly, seated rest break taken.    Balance   Balance Comments Relies on FWW   Sensory Examination   Sensory Perception Comments Pt reports history of peripheral neuropathy, but light touch was intact.    Clinical Impression   Criteria for Skilled Therapeutic Intervention " yes, treatment indicated   PT Diagnosis (PT) Impaired activity tolerance   Influenced by the following impairments 02 demand/RUSSELL, generalized weakness, impaired endurance   Functional limitations due to impairments Gait tolerance, activity tolerance    Clinical Presentation Stable/Uncomplicated   Clinical Presentation Rationale Pt presentation and clinical reasoning   Clinical Decision Making (Complexity) low complexity   Therapy Frequency (PT) 4x/week   Predicted Duration of Therapy Intervention (days/wks) Two weeks   Planned Therapy Interventions (PT) balance training;home exercise program;neuromuscular re-education;patient/family education;strengthening;transfer training   Anticipated Equipment Needs at Discharge (PT)   (TBD)   Risk & Benefits of therapy have been explained evaluation/treatment results reviewed;care plan/treatment goals reviewed;risks/benefits reviewed;participants included;patient;participants voiced agreement with care plan   Clinical Impression Comments Eval completed, treatment indicated. Pt presents with grossly impaired strength and activity tolerance; would benefit from skilled therapy services to increase endurance and muscle strength.    PT Discharge Planning    PT Discharge Recommendation (DC Rec) Transitional Care Facility   PT Rationale for DC Rec Pt will benefit from stay in Rehab to increase activity tolerance; lacks adequate activity tolerance to return home safely. Pt lives alone with intermittent help from family members. Currently not tolerating ambulation >25feet. Pt strongly declining TCU stay; if continues to decline, would recommend increased family support and/or HHPT.    PT Brief overview of current status  SBA + FWW; please ambulate with pt 3x / day with w/c follow   Total Evaluation Time   Total Evaluation Time (Minutes) 5

## 2021-06-25 NOTE — PLAN OF CARE
" Before bed./48 (BP Location: Left arm)   Pulse 102   Temp 97.9  F (36.6  C) (Oral)   Resp 18   Ht 1.676 m (5' 6\")   Wt 81.6 kg (180 lb)   SpO2 96%   BMI 29.05 kg/m  on 2-3 L n/c.  Shift: 2895-2192  Cardiac: tachycardic at times  Respiratory: Exp wheezes on left side ,diminished on right. Nebs every 4 hrs,Has barky,loud cough but not coughing up anything.SOB with movement.  GI/: Up to commode  Diet/Appetite: Regular diet.  Pain: denies pain.Said CP with cough is better.  LDA's: PIV saline locked.  Other: Blood glucose 189 before bed  Will continue to follow.  "

## 2021-06-25 NOTE — PLAN OF CARE
Occupational Therapy Discharge Summary    Reason for therapy discharge:    All goals and outcomes met, no further needs identified.    Progress towards therapy goal(s). See goals on Care Plan in [facility] electronic health record for goal details.  Goals met    Therapy recommendation(s):    Home OT for safety evaluation.

## 2021-06-25 NOTE — PLAN OF CARE
"BP (!) 147/64 (BP Location: Left arm)   Pulse 99   Temp 97.1  F (36.2  C) (Oral)   Resp 18   Ht 1.676 m (5' 6\")   Wt 81.6 kg (180 lb)   SpO2 93%   BMI 29.05 kg/m      Time 7182-7448      Reason for admission: COPD exacerbation  Vitals: VSS on 2-3LPM  Activity: Up Ind w/ walker  Pain: Denies pain  Neuro: A&Ox4, speech logical  Mood/Behavior: calm, cooperative  Cardiac: Tachycardic at times, mostly w/ activity  Respiratory: Frequent cough, RUSSELL  Diet:Regular  Lines: PIV-removed  Skin: CDI        Plan: Pt will be discharging home this afternoon.          "

## 2021-06-25 NOTE — PLAN OF CARE
Physical Therapy Discharge Summary    Reason for therapy discharge:    Discharged to home with OP therapies however recs for TCU    Progress towards therapy goal(s). See goals on Care Plan in [facility] electronic health record for goal details.  Goals not met.  Barriers to achieving goals:   limited tolerance for therapy, discharge from facility, and discharge on same date as initial evaluation.    Therapy recommendation(s):    Continued therapy is recommended.  Rationale/Recommendations:  to improve functional strength and activity tolerance.

## 2021-06-25 NOTE — DISCHARGE SUMMARY
[facility]    Family Medicine Discharge Summary- [facility]  Service    Date of Admission:  6/22/2021  Date of Service: 6/25/2021  Date of Discharge:  6/25/2021  Discharging Attending Provider: Jos Boyer DO  Discharge Team: [facility]    Discharge Diagnoses     COPD exacerbation (H)  Pneumonia of left lower lobe due to infectious organism  Chronic obstructive pulmonary disease with acute lower resp infection (H)  Cigarette smoker  Malnutrition, unspecified type (H)    Follow-ups Needed After Discharge      Follow up w/ Primary care provider for the following reasons:   - Hospital follow up   - Dietician/Nutrition outpatient   - sleep study referral for DAVID made on discharge; follow up   - home PT order placed    Follow up w/ Pulmonary regarding home oxygen and COPD medication management.    Hospital Course   Jenn is a 66 year old female with a history of severe COPD, adenocarcinoma of lung s/p lobectomy (2/2016), suspected NSCLC s/p radiation therapy (1/2020), tobacco use disorder, T2DM and HTN admitted for COPD exacerbation management.     # COPD exacerbation  # Shortness of breath  # hypoxia, SpO2 75% resolved  # tachycardia, resolved  # cough  # adenocarcinoma of lung s/p lobectomy (2/2016)  # suspected NSCLC s/p radiation therapy (1/2020)  FEV1<24% in 12/2020. Baseline 2L O2. RSV Panel negative 6/23/21. Treated for COPD exacerbation with:  - Prednisone 40mg daily 7 days 6/22-6/28  - Levaquin 750mg for 5 days 6/22 - 6/26/21  - Duoneb q6hr prn  - started Breo Ellipta, which patient had not been taking outpatient for insurance challenges. Confirmed with our pharmacy liaison that this will be covered for her  - Continue Cetirizine 10mg daily  - Continued Incruse Ellipta  - Letter for housing provided; it is clear that her apartment (mold, neighbors who smoke tobacco inside) is exacerbating her COPD  - PT was consulted and a TCU recommended. Discussed w/ patient but she  would like to be discharged to home at this time. Patient agreeable to having outpatient PT. Internal referral made at discharge    **Patients who qualify for home O2 coverage under the CMS guidelines require ABG tests or O2 sat readings obtained closest to, but no earlier than 2 days prior to the discharge, as evidence of the need for home oxygen therapy. Testing must be performed while patient is in the chronic stable state. See notes for O2 sats.**    #Chest pain  #Pleuritic pain  Similar to previous chest pain associated w/ previous COPD flares. Negative ACS work up. Ibuprofen as needed for pain     # Poor appetite  # Severe Malnutrition -  In the context of acute on chronic illness  Patient endorses poor oral intake of food and water since her stay. More due to her symptoms of cough.   - Multivitamin prescribed to be taken daily  - Nutrition was consulted and recommend chocolate Enlive with meals. Patient discharged w/ ensure.  Recommended following up w/ her PCP to help set her up w/ an outpatient dietitian.    Chronic treated  #Presumed DAVID  - Referral for sleep study made at discharge     #HTN  #T2DM  #Tobacco Use  # chronic neck pain     # Hepatitis C, cured  Genotype 1a. Was treated at [facility] 2010 with viral suppression. Negative PCR quant 10/2019    # Discharge Pain Plan:   - During her hospitalization, Jenn experienced chest pain due to cough 2/2 COPD exacerbation. The pain plan for discharge was discussed with Jenn and the plan was created in a collaborative fashion. NSAIDs as needed.    Consultations This Hospital Stay   CARE MANAGEMENT / SOCIAL WORK IP CONSULT  PHYSICAL THERAPY ADULT IP CONSULT  OCCUPATIONAL THERAPY ADULT IP CONSULT    Code Status   Full Code       The patient was discussed with DO Cleopatra Gross   MS4  [facility]  [facility]    Pager:[phone]_  ___________________________________________________________________  Review of Systems:  CONSTITUTIONAL: Positive for weight loss, 30 lbs in 4 months. NEGATIVE for fever, chills  RESP: Positive for SOB and cough w/ sputum production  CV: Positive for chest tenderness, reproducible on palpation. NEGATIVE palpitations or peripheral edema  GI: NEGATIVE for nausea, abdominal pain, heartburn, or change in bowel habits  NEURO: Positive for weakness and fatigue. NEGATIVE for dizziness or paresthesias    Physical Exam   Vital Signs: Temp: 97.1  F (36.2  C) Temp src: Oral BP: (!) 147/64 Pulse: 99   Resp: 18 SpO2: 93 % O2 Device: Nasal cannula Oxygen Delivery: 2 LPM  Weight: 180 lbs 0 oz  General Appearance:  No acute distress but uncomfortable  Respiratory: Expiratory wheezing throughout but improvement from admission, w/ exception to RLL given lobectomy. Cough improved.   Cardiovascular: Regular rate and rhythm. Normal S1 and S2. No murmurs.  Chest: Minimally tender, reproducible sternal tenderness  GI: Flat, soft, non-tender.  Skin: Warm and dry  Neurological: A&Ox3. Normal behavior.    Significant Results and Procedures   Results for orders placed or performed during the hospital encounter of 06/22/21   XR Chest Port 1 View    Narrative    Exam: XR CHEST PORT 1 VIEW, 6/22/2021 1:35 PM    Indication: SOB, cough, chest pain    Comparison: 4/2/2021, 2/23/2021    Findings:   Portable AP view of the chest was obtained. The cardiac silhouette is  normal. No pneumothorax or significant pleural effusion. Unchanged  chronic blunting of the right costophrenic angle. Increased left  basilar opacities. No acute osseous abnormalities.      Impression    Impression:   Increased left basilar opacities may represent infection, aspiration,  or atelectasis.    RICARDA EUBANKS MD   XR Chest 2 Views    Narrative    Chest 2 views 6/22/2021 at 1710 hours    INDICATION: Increased oxygen requirement    COMPARISON: Earlier today  1324 hours    FINDINGS: Heart size and shape appear normal. There is atherosclerosis  of the aortic arch. Bony structures show mild degenerative change of  the right shoulder AC joint an mild degenerative change in the  thoracic spine. No suspicious nodule or consolidation, or ectopic air  collection. Improved aeration of the left lower lung.      Impression    IMPRESSION: Aortic atherosclerosis. Improved aeration of left lower  lung.    MARK BARRERA MD       Pending Results   None currently.  Unresulted Labs Ordered in the Past 30 Days of this Admission     No orders found from 5/23/2021 to 6/23/2021.           Primary Care Physician   Mitzi Nettles    Discharge Disposition   Discharged to home, daughter's home.  Condition at discharge: Stable    Discharge Orders      Home Care PT Referral for Hospital Discharge      SLEEP EVALUATION & MANAGEMENT REFERRAL - [facility] [phone] (Age 15 and up)      Reason for your hospital stay    COPD exacerbation     Adult [facility] Follow-up and recommended labs and tests    Follow up with primary care provider, Mitzi Nettles, within 7 days to evaluate medication change and for hospital follow- up.  No follow up labs or test are needed. Recommend nutrition referral for recent weight loss    Pulmonology follow up in the next week to discuss baseline O2, COPD treatment      Appointments on [place] and/or [facility] (with [facility] or [facility] provider or service). Call [phone] if you haven't heard regarding these appointments within 7 days of discharge.     Activity    Your activity upon discharge: activity as tolerated, use O2 at home when you are up walking at 2L. Hopefully working with home PT will help improve your movements.     When to contact your care team    Call your primary doctor/go to the Emergency Department if you have any of the following: temperature greater than 100.2, increased shortness of breath or  coughing up blood.     Full Code     Oxygen Adult/Peds    Oxygen Documentation:   I certify that this patient, Jenn Aparicio has been under my care (or a nurse practitioner or physican's assistant working with me). This is the face-to-face encounter for oxygen medical necessity.      Jenn Aparicio is now in a chronic stable state and continues to require supplemental oxygen. Patient has continued oxygen desaturation due to COPD J44.9.    Alternative treatment(s) tried or considered and deemed clinically infective for treatment of COPD J44.9 include nebulizers, inhalers and steroids.  If portability is ordered, is the patient mobile within the home? yes    **Patients who qualify for home O2 coverage under the CMS guidelines require ABG tests or O2 sat readings obtained closest to, but no earlier than 2 days prior to the discharge, as evidence of the need for home oxygen therapy. Testing must be performed while patient is in the chronic stable state. See notes for O2 sats.**     Diet    Follow this diet upon discharge:      Snacks/Supplements Adult: Ensure Enlive; With Meals      Regular Diet Adult     Discharge Medications   Current Discharge Medication List      START taking these medications    Details   levofloxacin (LEVAQUIN) 750 MG tablet Take 1 tablet (750 mg) by mouth daily for 1 day  Qty: 1 tablet, Refills: 0    Associated Diagnoses: COPD exacerbation (H)      multivitamin w/minerals (THERA-VIT-M) tablet Take 1 tablet by mouth daily  Qty: 30 tablet, Refills: 0    Associated Diagnoses: Malnutrition, unspecified type (H)      Nutritional Supplements (ENSURE HIGH PROTEIN) Take 1 Can by mouth 3 times daily (with meals)  Qty: 47372 mL, Refills: 0    Comments: Chocolate, please  Associated Diagnoses: Malnutrition, unspecified type (H)      !! predniSONE (DELTASONE) 20 MG tablet Take 2 tablets (40 mg) by mouth daily for 7 doses  Qty: 14 tablet, Refills: 0    Associated Diagnoses: COPD exacerbation (H)       !! -  Potential duplicate medications found. Please discuss with provider.      CONTINUE these medications which have NOT CHANGED    Details   albuterol (PROAIR HFA/PROVENTIL HFA/VENTOLIN HFA) 108 (90 Base) MCG/ACT inhaler INHALE 1 OR 2 PUFFS BY MOUTH EVERY 4 HOURS AS NEEDED  Qty: 18 g, Refills: 7    Comments: Pharmacy may dispense brand covered by insurance (Proair, or proventil or ventolin or generic albuterol inhaler)  Associated Diagnoses: Adenocarcinoma of lung, unspecified laterality (H)      Alpha-Lipoic Acid 300 MG CAPS Take 300 mg by mouth daily      amLODIPine (NORVASC) 10 MG tablet Take 1 tablet (10 mg) by mouth daily  Qty: 90 tablet, Refills: 3    Associated Diagnoses: Essential hypertension      aspirin 81 MG EC tablet Take 81 mg by mouth daily      benzonatate (TESSALON) 200 MG capsule Take 1 capsule (200 mg) by mouth 3 times daily as needed for cough  Qty: 100 capsule, Refills: 3    Associated Diagnoses: Adenocarcinoma of lung, unspecified laterality (H); Chronic bronchitis, unspecified chronic bronchitis type (H)      cetirizine (ZYRTEC) 10 MG tablet Take 1 tablet (10 mg) by mouth daily  Qty: 30 tablet, Refills: 10    Associated Diagnoses: Adenocarcinoma of right lung (H); Seasonal allergic rhinitis, unspecified trigger      doxycycline hyclate (VIBRAMYCIN) 100 MG capsule Take 100 mg twice daily for five days if needed for treatment of a COPD exacerbation. Use alongside prednisone.  Qty: 10 capsule, Refills: 0    Associated Diagnoses: Chronic obstructive pulmonary disease with acute exacerbation (H)      fluticasone-vilanterol (BREO ELLIPTA) 200-25 MCG/INH inhaler Inhale 1 puff into the lungs daily  Qty: 1 each, Refills: 11    Associated Diagnoses: COPD (chronic obstructive pulmonary disease) (H)      gabapentin (NEURONTIN) 300 MG capsule TAKE 1 CAPSULE BY MOUTH AT BEDTIME  Qty: 90 capsule, Refills: 1    Associated Diagnoses: Other chest pain      glipiZIDE (GLUCOTROL XL) 2.5 MG 24 hr tablet Take 1 tablet  (2.5 mg) by mouth daily  Qty: 60 tablet, Refills: 3    Associated Diagnoses: Type 2 diabetes mellitus with stage 3 chronic kidney disease, without long-term current use of insulin, unspecified whether stage 3a or 3b CKD (H)      guaiFENesin (ROBITUSSIN) 20 mg/mL SOLN solution Take 10 mLs by mouth every 4 hours as needed for cough  Qty: 118 mL, Refills: 0    Associated Diagnoses: Chronic obstructive pulmonary disease with acute exacerbation (H)      ipratropium - albuterol 0.5 mg/2.5 mg/3 mL (DUONEB) 0.5-2.5 (3) MG/3ML neb solution Take 1 vial (3 mLs) by nebulization every 6 hours as needed for shortness of breath / dyspnea or wheezing  Qty: 1080 mL, Refills: 0    Associated Diagnoses: Adenocarcinoma of lung, unspecified laterality (H); Chronic obstructive pulmonary disease with acute exacerbation (H)      Lidocaine (SALONPAS PAIN RELIEVING) 4 % Patch Place onto the skin every 24 hours  Qty: 7 patch, Refills: 11    Associated Diagnoses: Chronic obstructive pulmonary disease with acute exacerbation (H)      nicotine (NICODERM CQ) 14 MG/24HR 24 hr patch Place 1 patch onto the skin every 24 hours  Qty: 84 patch, Refills: 1    Associated Diagnoses: Current tobacco use      !! predniSONE (DELTASONE) 20 MG tablet Take 40 mg daily for 5 days if needed for treatment of a COPD exacerbation, use alongside doxycycline  Qty: 10 tablet, Refills: 0    Associated Diagnoses: Chronic obstructive pulmonary disease with acute exacerbation (H)      sodium chloride (NEBUSAL) 3 % neb solution Take 3 mLs by nebulization daily Use 1-2 times daily in combination with Duoneb and Aerobika to help with mucus clearance  Qty: 250 mL, Refills: 3    Associated Diagnoses: Chronic obstructive pulmonary disease with acute exacerbation (H)      umeclidinium (INCRUSE ELLIPTA) 62.5 MCG/INH inhaler Inhale 1 puff into the lungs daily  Qty: 1 each, Refills: 3    Associated Diagnoses: Adenocarcinoma of lung, unspecified laterality (H)      vitamin D3  (CHOLECALCIFEROL) 50 mcg (2000 units) tablet Take by mouth daily Patient is unsure of dose but takes one tablet daily and things it is 50 mcg       !! - Potential duplicate medications found. Please discuss with provider.        Allergies   Allergies   Allergen Reactions     Interferons Dermatitis     Penicillins Hives     Aspirin      325mg      Codeine      Colon Care

## 2021-06-28 ENCOUNTER — TELEPHONE (OUTPATIENT)
Dept: INTERNAL MEDICINE | Facility: CLINIC | Age: 66
End: 2021-06-28

## 2021-06-28 ENCOUNTER — PATIENT OUTREACH (OUTPATIENT)
Dept: CARE COORDINATION | Facility: CLINIC | Age: 66
End: 2021-06-28

## 2021-06-28 DIAGNOSIS — Z71.89 OTHER SPECIFIED COUNSELING: ICD-10-CM

## 2021-06-28 NOTE — PROGRESS NOTES
[facility]: Post-Discharge Note  SITUATION                                                      Admission:    Admission Date: 06/22/21   Reason for Admission: COPD  Discharge:   Discharge Date: 06/25/21  Discharge Diagnosis: COPD Exacerbation    BACKGROUND                                                    Jenn is a 66 year old female with a history of severe COPD, adenocarcinoma of lung s/p lobectomy (2/2016), suspected NSCLC s/p radiation therapy (1/2020), tobacco use disorder, T2DM and HTN admitted for COPD exacerbation management.    ASSESSMENT      Discharge Assessment  Patient reports symptoms are: Improved  Does the patient have all of their medications?: Yes  Does patient know what their new medications are for?: Yes  Does patient have a follow-up appointment scheduled?: Yes  Does patient have any other questions or concerns?: No    Post-op  Fever: No  Chills: No  Eating & Drinking: eating and drinking without complaints/concerns  PO Intake: regular diet        PLAN                                                      Outpatient Plan:  Follow up with primary care provider, Mitzi Nettles, within 7 days to evaluate medication change and for hospital follow- up.  No follow up labs or test are needed. Recommend nutrition referral for recent weight loss     Pulmonology follow up in the next week to discuss baseline O2, COPD treatment       Appointments on [place] and/or [facility] (with [facility] or [facility] provider or service). Call [phone] if you haven't heard regarding these appointments within 7 days of discharge.    Future Appointments   Date Time Provider Department Center   7/2/2021 11:00 AM Rani Galeas MD [facility]   7/19/2021  9:30 AM  LAB [facility]   7/19/2021 10:20 AM [id number]   7/20/2021 10:00 AM Zarina Leung MD [facility]           Elizabeth Nieves MA

## 2021-06-28 NOTE — TELEPHONE ENCOUNTER
[facility] Call Center    Phone Message    May a detailed message be left on voicemail: yes     Reason for Call: Other: Thomas calling on behalf of the patient for the equipment recommendation:  Fax the letter of recommendation for the DME electric wheelchair or scooter for the patient to Thomas at [phone].      Action Taken: Message routed to:  [facility] PCC    Travel Screening: Not Applicable

## 2021-06-29 ENCOUNTER — TELEPHONE (OUTPATIENT)
Dept: INTERNAL MEDICINE | Facility: CLINIC | Age: 66
End: 2021-06-29

## 2021-06-29 NOTE — TELEPHONE ENCOUNTER
[facility] Call Center    Phone Message    May a detailed message be left on voicemail: yes     Reason for Call: Order(s): Home Care Orders: Physical Therapy (PT): April calling to delay orders of seeing the patient until next week due to the pt request.    Action Taken: Message routed to:  [facility] OLIVIA    Travel Screening: Not Applicable

## 2021-06-29 NOTE — TELEPHONE ENCOUNTER
April called to check on request for delay in start of care due to patient request. Please follow up to advise. Thank you!

## 2021-06-29 NOTE — TELEPHONE ENCOUNTER
I called and left detaileld  approving orders below.  Ilana De La Garza, EMT at 2:36 PM on 6/29/2021.  [facility]  [facility]  [place]  [phone]

## 2021-07-02 ENCOUNTER — OFFICE VISIT (OUTPATIENT)
Dept: INTERNAL MEDICINE | Facility: CLINIC | Age: 66
End: 2021-07-02
Payer: COMMERCIAL

## 2021-07-02 VITALS
WEIGHT: 200.3 LBS | OXYGEN SATURATION: 95 % | BODY MASS INDEX: 32.33 KG/M2 | SYSTOLIC BLOOD PRESSURE: 125 MMHG | DIASTOLIC BLOOD PRESSURE: 73 MMHG | HEART RATE: 96 BPM

## 2021-07-02 DIAGNOSIS — J44.1 COPD EXACERBATION (H): Primary | ICD-10-CM

## 2021-07-02 PROCEDURE — 99203 OFFICE O/P NEW LOW 30 MIN: CPT | Mod: GC | Performed by: INTERNAL MEDICINE

## 2021-07-02 NOTE — NURSING NOTE
Chief Complaint   Patient presents with     Hospital F/U     admission 6/22 - 6/25, COPD exacerbation, persistent issues but some improvement     Breathing Problem     up to 3L O2 via [place], was on prednisone and levofloxacin with improvement       Nadiya Cai EMT at 10:42 AM on 7/2/2021

## 2021-07-02 NOTE — PROGRESS NOTES
CC: F/U after hospitalization for a COPD exacerbation     HPI: Jenn Aparicio is a 66 year old female presenting for a f/u after a hospitalization for a COPD exacerbation on 6/22/21 to 6/25/21. Patient is reporting that she still has trouble breathing and gets winded performing menial physical activities.     She is on her last day of prednisone and finished the course of Levaquin.     On 3 L of oxygen during day and nighttime. Increased from 2 L due to the recent hospitalization.     Looking to relocate to another senior disabled living facility due to concerns that COPD exacerbations being a result of neighbors smoking and mold allergies.     Review of Systems:    Constitutional: Admits to having some weight loss and fatigue  Vision: Denies blurry vision, visual disturbances and changes. Denies congestion  Pulmonary: Denies SOB, chest tightness, wheezing  Cardio: Denies chest pain, palpitations  GI: Denies abdominal pain, constipation, diarrhea, N/V, heart burn  : Denies difficulty urinating, dysuria, frequency and urgency  Neurologic: Denies lightheadedness/dizziness  Hematologic: Denies easy bleeding, bruising    PAST MEDICAL HISTORY  Past Medical History:   Diagnosis Date     Adenocarcinoma, lung (H)      Asthma      Ectopic pregnancy      Pulmonary emphysema (H)     Very severe FEV1<30% predicted     Type II diabetes mellitus (H)          MEDICATIONS    Current Outpatient Medications:      albuterol (PROAIR HFA/PROVENTIL HFA/VENTOLIN HFA) 108 (90 Base) MCG/ACT inhaler, INHALE 1 OR 2 PUFFS BY MOUTH EVERY 4 HOURS AS NEEDED, Disp: 18 g, Rfl: 7     Alpha-Lipoic Acid 300 MG CAPS, Take 300 mg by mouth daily, Disp: , Rfl:      amLODIPine (NORVASC) 10 MG tablet, Take 1 tablet (10 mg) by mouth daily, Disp: 90 tablet, Rfl: 3     aspirin 81 MG EC tablet, Take 81 mg by mouth daily, Disp: , Rfl:      benzonatate (TESSALON) 200 MG capsule, Take 1 capsule (200 mg) by mouth 3 times daily as needed for cough, Disp: 100  capsule, Rfl: 3     cetirizine (ZYRTEC) 10 MG tablet, Take 1 tablet (10 mg) by mouth daily, Disp: 30 tablet, Rfl: 10     doxycycline hyclate (VIBRAMYCIN) 100 MG capsule, Take 100 mg twice daily for five days if needed for treatment of a COPD exacerbation. Use alongside prednisone., Disp: 10 capsule, Rfl: 0     fluticasone-vilanterol (BREO ELLIPTA) 200-25 MCG/INH inhaler, Inhale 1 puff into the lungs daily, Disp: 1 each, Rfl: 11     gabapentin (NEURONTIN) 300 MG capsule, TAKE 1 CAPSULE BY MOUTH AT BEDTIME, Disp: 90 capsule, Rfl: 1     glipiZIDE (GLUCOTROL XL) 2.5 MG 24 hr tablet, Take 1 tablet (2.5 mg) by mouth daily, Disp: 60 tablet, Rfl: 3     guaiFENesin (ROBITUSSIN) 20 mg/mL SOLN solution, Take 10 mLs by mouth every 4 hours as needed for cough, Disp: 118 mL, Rfl: 0     ipratropium - albuterol 0.5 mg/2.5 mg/3 mL (DUONEB) 0.5-2.5 (3) MG/3ML neb solution, Take 1 vial (3 mLs) by nebulization every 6 hours as needed for shortness of breath / dyspnea or wheezing, Disp: 1080 mL, Rfl: 0     Lidocaine (SALONPAS PAIN RELIEVING) 4 % Patch, Place onto the skin every 24 hours, Disp: 7 patch, Rfl: 11     multivitamin w/minerals (THERA-VIT-M) tablet, Take 1 tablet by mouth daily, Disp: 30 tablet, Rfl: 0     nicotine (NICODERM CQ) 14 MG/24HR 24 hr patch, Place 1 patch onto the skin every 24 hours, Disp: 84 patch, Rfl: 1     Nutritional Supplements (ENSURE HIGH PROTEIN), Take 1 Can by mouth 3 times daily (with meals), Disp: 40529 mL, Rfl: 0     sodium chloride (NEBUSAL) 3 % neb solution, Take 3 mLs by nebulization daily Use 1-2 times daily in combination with Duoneb and Aerobika to help with mucus clearance, Disp: 250 mL, Rfl: 3     umeclidinium (INCRUSE ELLIPTA) 62.5 MCG/INH inhaler, Inhale 1 puff into the lungs daily, Disp: 1 each, Rfl: 3     vitamin D3 (CHOLECALCIFEROL) 50 mcg (2000 units) tablet, Take by mouth daily Patient is unsure of dose but takes one tablet daily and things it is 50 mcg, Disp: , Rfl:      predniSONE  (DELTASONE) 20 MG tablet, Take 2 tablets (40 mg) by mouth daily for 7 doses (Patient not taking: Reported on 7/2/2021), Disp: 14 tablet, Rfl: 0     predniSONE (DELTASONE) 20 MG tablet, Take 40 mg daily for 5 days if needed for treatment of a COPD exacerbation, use alongside doxycycline (Patient not taking: Reported on 7/2/2021), Disp: 10 tablet, Rfl: 0      PAST SURGICAL HISTORY  1. S/p lobectomy RLL 2/2016    SOCIAL HISTORY  1. Tobacco, status; current consumption: last cigarette >3-4 weeks ago  2. Alcohol: none  3. Drugs: none    FAMILY HISTORY  Family History   Problem Relation Age of Onset     Depression Daughter      Alcohol/Drug Other         self     Diabetes Other         self     Thyroid Disease Other         self     Asthma Other         self         ALLERGIES     Allergies   Allergen Reactions     Interferons Dermatitis     Penicillins Hives     Aspirin      325mg      Codeine      Colon Care      OBJECTIVE    Vitals  /73   Pulse 96   Wt 90.9 kg (200 lb 4.8 oz)   SpO2 95%   Breastfeeding No   BMI 32.33 kg/m      Physical Exam  Physical Exam  Constitutional:       Appearance: She is obese.   HENT:      Head: Normocephalic and atraumatic.   Cardiovascular:      Rate and Rhythm: Normal rate and regular rhythm.      Heart sounds: Normal heart sounds.   Pulmonary:      Effort: Pulmonary effort is normal.      Comments: Diminished breath sounds B/L. No rales or crackles heard  Neurological:      Mental Status: She is alert and oriented to person, place, and time.   Psychiatric:         Mood and Affect: Mood normal.         Behavior: Behavior normal.         ASSESSMENT AND PLAN    1. F/u COPD exacerbation    Hospitalized 6/22 to 6/25/21 for COPD exacerbation. This is the second hospitalization this year. Concerns that exacerbations are a result of second hand smoking from her neighbors or mold allergies in her residence. In the process of relocating to another senior disabled living facility. Patient  is frustrated about living situation - feels as if therapy is insignificant if she keeps going back to the possible source of the problem.     Patient is on 3L of oxygen day and night. Still finds it hard to breath. 95% SpO2 today in clinic but notes that it sometimes drops to the 60s when performing light physical activity.     Patient is using albuteral and breo as prescribed. Discussed how pulmonary rehab may help in improving her breathing and her ability to carry out physical activity. Provided patient with pulm rehab number - patient will reach out.     Physical therapy appointment also pending. Noted that PT will call on Monday 7/5/21 to schedule.     2. Diabetes type 2    Discussed elevated blood glucose, last one was 321 on 6/25/21. Likely a sideeffect from steroid treatment for COPD exacerbation. Patient also acknowledged. Advised patient to take daily morning blood glucose measurements.     Currently on oral glipizide. Consider adding Lantus especially if patient continues to have recurrent COPD exacerbations with steroid treatments. Was previously on insulin in the past but notes it was discontinued due to episodes of hypoglycemia.    Patient to follow up in a month with Dr. Campos.     Last hba1c was 7.0 on 2/24/21. Not ordered today as it would likely be elevated due to recent history of steroid therapy.     No sores noted b/l on physical. Encouraged patient to set up optho appointment for diabetic eye care.     3. Adenocarcinoma of the lung, s/p lobectomy    Managed by pulmonology, upcoming appointment 7/19/21    Patient understands and agrees with the above assessment and plan. Case was discussed with Dr. Galeas.     Nyasia Coffey DO  PGY 1, Internal Medicine  Pager: [phone]

## 2021-07-09 ENCOUNTER — TELEPHONE (OUTPATIENT)
Dept: INTERNAL MEDICINE | Facility: CLINIC | Age: 66
End: 2021-07-09

## 2021-07-09 NOTE — TELEPHONE ENCOUNTER
[facility] Call Center    Phone Message    May a detailed message be left on voicemail: yes     Reason for Call: Order(s): Home Care Orders: Physical Therapy (PT): .Two times a week for two weeks, one time a week for two weeks.    Skilled nursing:  Evaluate and treat    Occupational Therapy: evaluate and treat    Home care is requesting a response today. I notified caller of the 24 business hour turn around time our office has for responding to messages.       Action Taken: Message routed to:  [facility] ([facility]): primary care clinic    Travel Screening: Not Applicable

## 2021-07-12 NOTE — TELEPHONE ENCOUNTER
Verbal orders given to Brayan from [facility], per Mitzi Nettles, for   Order(s): Home Care Orders: Physical Therapy (PT): .Two times a week for two weeks, one time a week for two weeks.     Skilled nursing:  Evaluate and treat     Occupational Therapy: evaluate and treat.  Marni Killian LPN 7/12/2021 11:15 AM

## 2021-07-16 NOTE — TELEPHONE ENCOUNTER
Brayan from  homecare called and stated that the pt is declining all homecare services at this time, pt is focusing on finding a new apartment.

## 2021-07-19 ENCOUNTER — ANCILLARY PROCEDURE (OUTPATIENT)
Dept: CT IMAGING | Facility: CLINIC | Age: 66
End: 2021-07-19
Attending: NURSE PRACTITIONER
Payer: COMMERCIAL

## 2021-07-19 ENCOUNTER — LAB (OUTPATIENT)
Dept: LAB | Facility: CLINIC | Age: 66
End: 2021-07-19
Payer: COMMERCIAL

## 2021-07-19 DIAGNOSIS — C34.91 ADENOCARCINOMA OF RIGHT LUNG (H): ICD-10-CM

## 2021-07-19 DIAGNOSIS — C34.91 ADENOCARCINOMA, LUNG, RIGHT (H): ICD-10-CM

## 2021-07-19 LAB
ALBUMIN SERPL-MCNC: 3.4 G/DL (ref 3.4–5)
ALP SERPL-CCNC: 92 U/L (ref 40–150)
ALT SERPL W P-5'-P-CCNC: 17 U/L (ref 0–50)
ANION GAP SERPL CALCULATED.3IONS-SCNC: 4 MMOL/L (ref 3–14)
AST SERPL W P-5'-P-CCNC: 8 U/L (ref 0–45)
BASOPHILS # BLD AUTO: 0 10E3/UL (ref 0–0.2)
BASOPHILS NFR BLD AUTO: 1 %
BILIRUB SERPL-MCNC: 0.4 MG/DL (ref 0.2–1.3)
BUN SERPL-MCNC: 8 MG/DL (ref 7–30)
CALCIUM SERPL-MCNC: 8.9 MG/DL (ref 8.5–10.1)
CHLORIDE BLD-SCNC: 107 MMOL/L (ref 94–109)
CO2 SERPL-SCNC: 30 MMOL/L (ref 20–32)
CREAT SERPL-MCNC: 0.56 MG/DL (ref 0.52–1.04)
EOSINOPHIL # BLD AUTO: 0.2 10E3/UL (ref 0–0.7)
EOSINOPHIL NFR BLD AUTO: 3 %
ERYTHROCYTE [DISTWIDTH] IN BLOOD BY AUTOMATED COUNT: 12.8 % (ref 10–15)
GFR SERPL CREATININE-BSD FRML MDRD: >90 ML/MIN/1.73M2
GLUCOSE BLD-MCNC: 230 MG/DL (ref 70–99)
HCT VFR BLD AUTO: 44 % (ref 35–47)
HGB BLD-MCNC: 13.6 G/DL (ref 11.7–15.7)
IMM GRANULOCYTES # BLD: 0 10E3/UL
IMM GRANULOCYTES NFR BLD: 0 %
LYMPHOCYTES # BLD AUTO: 1.9 10E3/UL (ref 0.8–5.3)
LYMPHOCYTES NFR BLD AUTO: 34 %
MCH RBC QN AUTO: 28 PG (ref 26.5–33)
MCHC RBC AUTO-ENTMCNC: 30.9 G/DL (ref 31.5–36.5)
MCV RBC AUTO: 91 FL (ref 78–100)
MONOCYTES # BLD AUTO: 0.4 10E3/UL (ref 0–1.3)
MONOCYTES NFR BLD AUTO: 7 %
NEUTROPHILS # BLD AUTO: 3 10E3/UL (ref 1.6–8.3)
NEUTROPHILS NFR BLD AUTO: 55 %
NRBC # BLD AUTO: 0 10E3/UL
NRBC BLD AUTO-RTO: 0 /100
PLATELET # BLD AUTO: 206 10E3/UL (ref 150–450)
POTASSIUM BLD-SCNC: 3.7 MMOL/L (ref 3.4–5.3)
PROT SERPL-MCNC: 7 G/DL (ref 6.8–8.8)
RBC # BLD AUTO: 4.86 10E6/UL (ref 3.8–5.2)
SODIUM SERPL-SCNC: 141 MMOL/L (ref 133–144)
WBC # BLD AUTO: 5.5 10E3/UL (ref 4–11)

## 2021-07-19 PROCEDURE — 85025 COMPLETE CBC W/AUTO DIFF WBC: CPT | Performed by: PATHOLOGY

## 2021-07-19 PROCEDURE — 80053 COMPREHEN METABOLIC PANEL: CPT | Performed by: PATHOLOGY

## 2021-07-19 PROCEDURE — 36415 COLL VENOUS BLD VENIPUNCTURE: CPT | Performed by: PATHOLOGY

## 2021-07-19 PROCEDURE — 71250 CT THORAX DX C-: CPT | Performed by: RADIOLOGY

## 2021-07-19 NOTE — PROGRESS NOTES
[facility]  FOLLOW-UP VISIT NOTE    PATIENT NAME: Jenn Aparicio  MRN # [id number]   DATE OF VISIT: July 20, 2021  YOB: 1955     CANCER TYPE: Lung adenocarcinoma  STAGE: IA2 [id number] poorly diff adeno RLL, then [id number] IA2 suspected NSCLC RUL, medically inoperable     ECOG PS: 2    Cancer Staging  No matching staging information was found for the patient.    PD-L1: N/A  Lung panel: N/A  NGS: N/A    SUMMARY  12/24/15 PET/CT  1/13/15 CT lung bx. Adenocarcinoma  2/8/16 R thoracotomy, RLL lobectomy (Dr. Cunha). Adenocarcinoma, 1.1 cm, assoicated with atypical adenomatous hyperplasia  10/18/19 CTA for shortness of breath. New 1.3 cm RUL nodule  11/4~6/19 PET/CT. 1.1 cm RUL hypermetabolic nodule (SUV 12.6)  12/26/19 Brain MRI negative for mets  1/14~1/28/20 SBRT to RUL nodule, 5000 cGy, every other day, 1000 cGy (Dr. Currie at [facility]). No bx due to O2 dependence and too much risk  8/19/20 First visit with me    ASSESSMENT AND PLAN   NSCLC, adeno, RUL: Now just over 1 1/2 years after SBRT. There's a tiny nodule in the RUL that might be inflammatory but it's a little more noticeable than the last scan. More GGO than solid. CT chest in 3 months with labs. I'll see her in person afterward on the same day as the scan; rides and transportation are difficult for her.     Skin tag: Asked her to get that looked at by her PCP's clinic. Gave her the [place]'s #    COPD: Per Dr. Nettles. Can't make it to a pulm appt or sleep study as recommended due to social issues    Housing: Hoping to have a new place in [place] in the next month or two    Review of the result(s) of each unique test - CMP, CBC pd  Independent interpretation of a test performed by another physician/other qualified health care professional (not separately reported) - CT chest    25 minutes spent on the phone call    Zarina Leung MD  Associate Professor of Medicine  Hematology, Oncology and Transplantation      SUBJECTIVE  Jenn returns  today for follow up of h/o lung cancer now 1 year and 8 months after SBRT to the RUL.     Having more trouble with shortness of breath recently. Hospitalized twice for COPD.     Has a skin tag vs mole on her leg that's been rubbing on her pant leg.     PCP referred to home care for PT but Jenn preferred to wait since there are issues getting into her place - some of the doors are sealed off, for example. Was getting so overwhelming. Same with the sleep study.     Otherwise doing about the same.     Hoping to move within the next month to [place]. Has a meeting later this week to help determine that    PAST MEDICAL HISTORY  Lung adenocarcinoma  DM2  HCV IA  COPD. O2 dependent since late 2018. PFT 11/26/19. FEV1 0.64 (30%), FVC 1.69 (63%), DLCOunc 9.8 (38%)   HTN  H/o ITP  H/o disk herniation  Ankle surgery  Median neuropathy R  H/o sessile colon polyps 2014, h/o adenomas before that. [place] [facility]. Sessile serated adenoma x 1, tubular adenoma x 3   H/o chronic LBP  Dyslipidemia  Cataract    CURRENT OUTPATIENT MEDICATIONS  Current Outpatient Medications   Medication Sig Dispense Refill     albuterol (PROAIR HFA/PROVENTIL HFA/VENTOLIN HFA) 108 (90 Base) MCG/ACT inhaler INHALE 1 OR 2 PUFFS BY MOUTH EVERY 4 HOURS AS NEEDED 18 g 7     Alpha-Lipoic Acid 300 MG CAPS Take 300 mg by mouth daily       amLODIPine (NORVASC) 10 MG tablet Take 1 tablet (10 mg) by mouth daily 90 tablet 3     aspirin 81 MG EC tablet Take 81 mg by mouth daily       benzonatate (TESSALON) 200 MG capsule Take 1 capsule (200 mg) by mouth 3 times daily as needed for cough 100 capsule 3     cetirizine (ZYRTEC) 10 MG tablet Take 1 tablet (10 mg) by mouth daily 30 tablet 10     doxycycline hyclate (VIBRAMYCIN) 100 MG capsule Take 100 mg twice daily for five days if needed for treatment of a COPD exacerbation. Use alongside prednisone. 10 capsule 0     fluticasone-vilanterol (BREO ELLIPTA) 200-25 MCG/INH inhaler Inhale 1 puff into the lungs  daily 1 each 11     gabapentin (NEURONTIN) 300 MG capsule TAKE 1 CAPSULE BY MOUTH AT BEDTIME 90 capsule 1     glipiZIDE (GLUCOTROL XL) 2.5 MG 24 hr tablet Take 1 tablet (2.5 mg) by mouth daily 60 tablet 3     guaiFENesin (ROBITUSSIN) 20 mg/mL SOLN solution Take 10 mLs by mouth every 4 hours as needed for cough 118 mL 0     ipratropium - albuterol 0.5 mg/2.5 mg/3 mL (DUONEB) 0.5-2.5 (3) MG/3ML neb solution Take 1 vial (3 mLs) by nebulization every 6 hours as needed for shortness of breath / dyspnea or wheezing 1080 mL 0     Lidocaine (SALONPAS PAIN RELIEVING) 4 % Patch Place onto the skin every 24 hours 7 patch 11     multivitamin w/minerals (THERA-VIT-M) tablet Take 1 tablet by mouth daily 30 tablet 0     nicotine (NICODERM CQ) 14 MG/24HR 24 hr patch Place 1 patch onto the skin every 24 hours 84 patch 1     Nutritional Supplements (ENSURE HIGH PROTEIN) Take 1 Can by mouth 3 times daily (with meals) 16659 mL 0     predniSONE (DELTASONE) 20 MG tablet Take 40 mg daily for 5 days if needed for treatment of a COPD exacerbation, use alongside doxycycline (Patient not taking: Reported on 7/2/2021) 10 tablet 0     sodium chloride (NEBUSAL) 3 % neb solution Take 3 mLs by nebulization daily Use 1-2 times daily in combination with Duoneb and Aerobika to help with mucus clearance 250 mL 3     umeclidinium (INCRUSE ELLIPTA) 62.5 MCG/INH inhaler Inhale 1 puff into the lungs daily 1 each 3     vitamin D3 (CHOLECALCIFEROL) 50 mcg (2000 units) tablet Take by mouth daily Patient is unsure of dose but takes one tablet daily and things it is 50 mcg       ALLERGIES  Allergies   Allergen Reactions     Interferons Dermatitis     Penicillins Hives     Aspirin      325mg      Codeine      Colon Care        REVIEW OF SYSTEMS  As above in the HPI, o/w complete 12-point ROS was negative.    PHYSICAL EXAM  There were no vitals taken for this visit.  Wt Readings from Last 3 Encounters:   05/25/21 75 kg (165 lb 6.4 oz)   05/05/21 74.4 kg (164 lb)    05/05/21 74.8 kg (164 lb 14.4 oz)     GEN: sounds ok    Remainder of physical exam deferred due to public health emergency and limitations of telephone visit.    LABORATORY AND IMAGING STUDIES  Results for JOHN POOLE (MRN [id number]) as of 7/20/2021 10:29   7/19/2021 09:32   Sodium 141   Potassium 3.7   Chloride 107   Carbon Dioxide 30   Urea Nitrogen 8   Creatinine 0.56   GFR Estimate >90   Calcium 8.9   Anion Gap 4   Albumin 3.4   Protein Total 7.0   Bilirubin Total 0.4   Alkaline Phosphatase 92   ALT 17   AST 8   Glucose 230 (H)   WBC 5.5   Hemoglobin 13.6   Hematocrit 44.0   Platelet Count 206   RBC Count 4.86   MCV 91   MCH 28.0   MCHC 30.9 (L)   RDW 12.8   % Neutrophils 55   % Lymphocytes 34   % Monocytes 7   % Eosinophils 3   Absolute Basophils 0.0   % Basophils 1   Absolute Eosinophils 0.2   Absolute Immature Granulocytes 0.0   Absolute Lymphocytes 1.9   Absolute Monocytes 0.4   % Immature Granulocytes 0   Absolute Neutrophils 3.0   Absolute NRBCs 0.0   NRBCs per 100 WBC 0     Labs were independently reviewed and interpreted by me    CT Chest w/o Contrast  Narrative: Chest CT without contrast    INDICATION: Lung cancer, assess for recurrence    COMPARISON: 4/2/2021    FINDINGS: No discrete complete solid pulmonary nodule. Surgical  changes at the right apex with no increasing soft tissues anteriorly.  Posterior right apical opacity (series 4 image 30) appears unchanged,  7 mm in total dimension. Mild emphysematous changes. Unchanged  pleural-based opacification with surrounding groundglass (series 4  image 93) appears similar including 7 mm solid component.  No enlarged axillary, mediastinal or hilar lymph nodes. There is  thoracic aortic and coronary artery atherosclerosis. No pleural or  pericardial effusion. The included thyroid appears normal.  Upper abdomen appears unremarkable.  Bones show mild degenerative changes in the thoracic spine with no  suspicious sclerotic or lytic/destructive  lesion.  Impression: IMPRESSION: Unchanged opacities and surgical changes in right apex  compared with April 2021. Atherosclerosis including coronary artery  disease. Thoracic spondylosis.    MARK BARRERA MD         SYSTEM ID:  [id number]     Imaging was personally reviewed and interpreted by me. RUL nodule a little more prominent  Series 4 image 30

## 2021-07-20 ENCOUNTER — VIRTUAL VISIT (OUTPATIENT)
Dept: ONCOLOGY | Facility: CLINIC | Age: 66
End: 2021-07-20
Attending: INTERNAL MEDICINE
Payer: COMMERCIAL

## 2021-07-20 DIAGNOSIS — C34.91 ADENOCARCINOMA OF RIGHT LUNG (H): Primary | ICD-10-CM

## 2021-07-20 PROCEDURE — 99443 PR PHYSICIAN TELEPHONE EVALUATION 21-30 MIN: CPT | Mod: 95 | Performed by: INTERNAL MEDICINE

## 2021-07-20 PROCEDURE — 999N001193 HC VIDEO/TELEPHONE VISIT; NO CHARGE

## 2021-07-20 NOTE — LETTER
7/20/2021         RE: Jenn Aparicio  [address] [place] [address]        Dear Colleague,    Thank you for referring your patient, Jenn Aparicio, to the [facility]. Please see a copy of my visit note below.       [facility]  FOLLOW-UP VISIT NOTE    PATIENT NAME: Jenn Aparicio  MRN # [id number]   DATE OF VISIT: July 20, 2021  YOB: 1955     CANCER TYPE: Lung adenocarcinoma  STAGE: IA2 [id number] poorly diff adeno RLL, then [id number] IA2 suspected NSCLC RUL, medically inoperable     ECOG PS: 2    Cancer Staging  No matching staging information was found for the patient.    PD-L1: N/A  Lung panel: N/A  NGS: N/A    SUMMARY  12/24/15 PET/CT  1/13/15 CT lung bx. Adenocarcinoma  2/8/16 R thoracotomy, RLL lobectomy (Dr. Cunha). Adenocarcinoma, 1.1 cm, assoicated with atypical adenomatous hyperplasia  10/18/19 CTA for shortness of breath. New 1.3 cm RUL nodule  11/4~6/19 PET/CT. 1.1 cm RUL hypermetabolic nodule (SUV 12.6)  12/26/19 Brain MRI negative for mets  1/14~1/28/20 SBRT to RUL nodule, 5000 cGy, every other day, 1000 cGy (Dr. Currie at [facility] [place]). No bx due to O2 dependence and too much risk  8/19/20 First visit with me    ASSESSMENT AND PLAN   NSCLC, adeno, RUL: Now just over 1 1/2 years after SBRT. There's a tiny nodule in the RUL that might be inflammatory but it's a little more noticeable than the last scan. More GGO than solid. CT chest in 3 months with labs. I'll see her in person afterward on the same day as the scan; rides and transportation are difficult for her.     Skin tag: Asked her to get that looked at by her PCP's clinic. Gave her the [place]'s #    COPD: Per Dr. Nettles. Can't make it to a pulm appt or sleep study as recommended due to social issues    Housing: Hoping to have a new place in [place] in the next month or two    Review of the result(s) of each unique test - CMP, CBC pd  Independent interpretation of a test performed by another  physician/other qualified health care professional (not separately reported) - CT chest    25 minutes spent on the phone call    Zarina Leung MD  Associate Professor of Medicine  Hematology, Oncology and Transplantation      SUBJECTIVE  Jenn returns today for follow up of h/o lung cancer now 1 year and 8 months after SBRT to the RUL.     Having more trouble with shortness of breath recently. Hospitalized twice for COPD.     Has a skin tag vs mole on her leg that's been rubbing on her pant leg.     PCP referred to home care for PT but Jenn preferred to wait since there are issues getting into her place - some of the doors are sealed off, for example. Was getting so overwhelming. Same with the sleep study.     Otherwise doing about the same.     Hoping to move within the next month to [place]. Has a meeting later this week to help determine that    PAST MEDICAL HISTORY  Lung adenocarcinoma  DM2  HCV IA  COPD. O2 dependent since late 2018. PFT 11/26/19. FEV1 0.64 (30%), FVC 1.69 (63%), DLCOunc 9.8 (38%)   HTN  H/o ITP  H/o disk herniation  Ankle surgery  Median neuropathy R  H/o sessile colon polyps 2014, h/o adenomas before that. [place] [facility] [place] Sessile serated adenoma x 1, tubular adenoma x 3   H/o chronic LBP  Dyslipidemia  Cataract    CURRENT OUTPATIENT MEDICATIONS  Current Outpatient Medications   Medication Sig Dispense Refill     albuterol (PROAIR HFA/PROVENTIL HFA/VENTOLIN HFA) 108 (90 Base) MCG/ACT inhaler INHALE 1 OR 2 PUFFS BY MOUTH EVERY 4 HOURS AS NEEDED 18 g 7     Alpha-Lipoic Acid 300 MG CAPS Take 300 mg by mouth daily       amLODIPine (NORVASC) 10 MG tablet Take 1 tablet (10 mg) by mouth daily 90 tablet 3     aspirin 81 MG EC tablet Take 81 mg by mouth daily       benzonatate (TESSALON) 200 MG capsule Take 1 capsule (200 mg) by mouth 3 times daily as needed for cough 100 capsule 3     cetirizine (ZYRTEC) 10 MG tablet Take 1 tablet (10 mg) by mouth daily 30 tablet 10     doxycycline  hyclate (VIBRAMYCIN) 100 MG capsule Take 100 mg twice daily for five days if needed for treatment of a COPD exacerbation. Use alongside prednisone. 10 capsule 0     fluticasone-vilanterol (BREO ELLIPTA) 200-25 MCG/INH inhaler Inhale 1 puff into the lungs daily 1 each 11     gabapentin (NEURONTIN) 300 MG capsule TAKE 1 CAPSULE BY MOUTH AT BEDTIME 90 capsule 1     glipiZIDE (GLUCOTROL XL) 2.5 MG 24 hr tablet Take 1 tablet (2.5 mg) by mouth daily 60 tablet 3     guaiFENesin (ROBITUSSIN) 20 mg/mL SOLN solution Take 10 mLs by mouth every 4 hours as needed for cough 118 mL 0     ipratropium - albuterol 0.5 mg/2.5 mg/3 mL (DUONEB) 0.5-2.5 (3) MG/3ML neb solution Take 1 vial (3 mLs) by nebulization every 6 hours as needed for shortness of breath / dyspnea or wheezing 1080 mL 0     Lidocaine (SALONPAS PAIN RELIEVING) 4 % Patch Place onto the skin every 24 hours 7 patch 11     multivitamin w/minerals (THERA-VIT-M) tablet Take 1 tablet by mouth daily 30 tablet 0     nicotine (NICODERM CQ) 14 MG/24HR 24 hr patch Place 1 patch onto the skin every 24 hours 84 patch 1     Nutritional Supplements (ENSURE HIGH PROTEIN) Take 1 Can by mouth 3 times daily (with meals) 56572 mL 0     predniSONE (DELTASONE) 20 MG tablet Take 40 mg daily for 5 days if needed for treatment of a COPD exacerbation, use alongside doxycycline (Patient not taking: Reported on 7/2/2021) 10 tablet 0     sodium chloride (NEBUSAL) 3 % neb solution Take 3 mLs by nebulization daily Use 1-2 times daily in combination with Duoneb and Aerobika to help with mucus clearance 250 mL 3     umeclidinium (INCRUSE ELLIPTA) 62.5 MCG/INH inhaler Inhale 1 puff into the lungs daily 1 each 3     vitamin D3 (CHOLECALCIFEROL) 50 mcg (2000 units) tablet Take by mouth daily Patient is unsure of dose but takes one tablet daily and things it is 50 mcg       ALLERGIES  Allergies   Allergen Reactions     Interferons Dermatitis     Penicillins Hives     Aspirin      325mg      Codeine       Colon Care        REVIEW OF SYSTEMS  As above in the HPI, o/w complete 12-point ROS was negative.    PHYSICAL EXAM  There were no vitals taken for this visit.  Wt Readings from Last 3 Encounters:   05/25/21 75 kg (165 lb 6.4 oz)   05/05/21 74.4 kg (164 lb)   05/05/21 74.8 kg (164 lb 14.4 oz)     GEN: sounds ok    Remainder of physical exam deferred due to public health emergency and limitations of telephone visit.    LABORATORY AND IMAGING STUDIES  Results for JOHN POOLE (MRN [id number]) as of 7/20/2021 10:29   7/19/2021 09:32   Sodium 141   Potassium 3.7   Chloride 107   Carbon Dioxide 30   Urea Nitrogen 8   Creatinine 0.56   GFR Estimate >90   Calcium 8.9   Anion Gap 4   Albumin 3.4   Protein Total 7.0   Bilirubin Total 0.4   Alkaline Phosphatase 92   ALT 17   AST 8   Glucose 230 (H)   WBC 5.5   Hemoglobin 13.6   Hematocrit 44.0   Platelet Count 206   RBC Count 4.86   MCV 91   MCH 28.0   MCHC 30.9 (L)   RDW 12.8   % Neutrophils 55   % Lymphocytes 34   % Monocytes 7   % Eosinophils 3   Absolute Basophils 0.0   % Basophils 1   Absolute Eosinophils 0.2   Absolute Immature Granulocytes 0.0   Absolute Lymphocytes 1.9   Absolute Monocytes 0.4   % Immature Granulocytes 0   Absolute Neutrophils 3.0   Absolute NRBCs 0.0   NRBCs per 100 WBC 0     Labs were independently reviewed and interpreted by me    CT Chest w/o Contrast  Narrative: Chest CT without contrast    INDICATION: Lung cancer, assess for recurrence    COMPARISON: 4/2/2021    FINDINGS: No discrete complete solid pulmonary nodule. Surgical  changes at the right apex with no increasing soft tissues anteriorly.  Posterior right apical opacity (series 4 image 30) appears unchanged,  7 mm in total dimension. Mild emphysematous changes. Unchanged  pleural-based opacification with surrounding groundglass (series 4  image 93) appears similar including 7 mm solid component.  No enlarged axillary, mediastinal or hilar lymph nodes. There is  thoracic aortic and coronary  artery atherosclerosis. No pleural or  pericardial effusion. The included thyroid appears normal.  Upper abdomen appears unremarkable.  Bones show mild degenerative changes in the thoracic spine with no  suspicious sclerotic or lytic/destructive lesion.  Impression: IMPRESSION: Unchanged opacities and surgical changes in right apex  compared with April 2021. Atherosclerosis including coronary artery  disease. Thoracic spondylosis.    MARK BARRERA MD         SYSTEM ID:  [id number]     Imaging was personally reviewed and interpreted by me. RUL nodule a little more prominent  Series 4 image 30           Again, thank you for allowing me to participate in the care of your patient.        Sincerely,        Zarina Leung MD

## 2021-07-22 ENCOUNTER — MEDICAL CORRESPONDENCE (OUTPATIENT)
Dept: HEALTH INFORMATION MANAGEMENT | Facility: CLINIC | Age: 66
End: 2021-07-22

## 2021-07-23 DIAGNOSIS — Z53.9 DIAGNOSIS NOT YET DEFINED: Primary | ICD-10-CM

## 2021-07-29 ENCOUNTER — MEDICAL CORRESPONDENCE (OUTPATIENT)
Dept: HEALTH INFORMATION MANAGEMENT | Facility: CLINIC | Age: 66
End: 2021-07-29

## 2021-08-09 ENCOUNTER — DOCUMENTATION ONLY (OUTPATIENT)
Dept: FAMILY MEDICINE | Facility: CLINIC | Age: 66
End: 2021-08-09

## 2021-08-11 ENCOUNTER — TELEPHONE (OUTPATIENT)
Dept: INTERNAL MEDICINE | Facility: CLINIC | Age: 66
End: 2021-08-11

## 2021-08-11 NOTE — TELEPHONE ENCOUNTER
[facility] Call Center    Phone Message    May a detailed message be left on voicemail: no     Reason for Call: Other: Jamin from [facility] scripts requesting call back to f/u on order that was faxed to the clinic on 8/5 for diabetic testing supplies and some pain medication     Action Taken: Message routed to:  [facility] pcc

## 2021-08-13 NOTE — CONFIDENTIAL NOTE
Faxed prescriptions to [phone] to [facility].    Dia Benitez on 8/13/2021 at 7:25 AM

## 2021-08-19 ENCOUNTER — TELEPHONE (OUTPATIENT)
Dept: INTERNAL MEDICINE | Facility: CLINIC | Age: 66
End: 2021-08-19

## 2021-08-19 NOTE — TELEPHONE ENCOUNTER
[facility] Call Center    Phone Message    May a detailed message be left on voicemail: no     Reason for Call: Other: Mariama from [facility] called requesting the status of a few prescriptions they requested. Please fax prescriptions to [phone]. If calling please provide reference code: [id number]     Action Taken: Message routed to:  [facility]    Travel Screening: Not Applicable

## 2021-08-19 NOTE — PROGRESS NOTES
Telephone call to the client's home for annual health risk assessment.  An  was not needed.    Current situation/living environment  Client lives in a  1 bedroom apartment. She is working with housing stabilization services to look for alternative housing.     Activities of daily living (ADL)/instrumental activities of daily living (IADL) and functional issues  Client needs help with the following ADL's: n/a-independent  Client needs help with the following IADL's: shopping, cooking, housekeeping and laundry  Client states she is unable to perform the above due to SOB with exertion/O2 dependence. Client has homemaking, Mom's Meals, and ILS services in place for assistance with IALDs and household management.       Health concerns for today  Client continues to have SOB with exertions. She sees pulmonology and oncology regularly.   Has patient fallen 2 or more times in the last year? No  Has patient fallen with injury in the last year? No    Cognition/mental health  Client is A&O x3. She reports anxiety about her living situation but denies mental health symptoms and feels it is more situational.     STARS/Med Adherence  Client is non-compliant with the following quality measures: Colon cancer screening  Comments: education efforts    Client's Plan of Care consists of:  Homemaker services (5 hours per week), Life line, Specialized supplies and equipment (air filters), ILS  and Bus passes through [employer] waiver.    Marti Christianson RN  [facility] Care Coordinator  [phone]

## 2021-08-20 NOTE — TELEPHONE ENCOUNTER
Spoke with Ben. They are requesting the refill of triamcinolone cream, it is not on the med list and we never prescribed this in the past. Refill was denied.

## 2021-09-06 DIAGNOSIS — R07.89 OTHER CHEST PAIN: ICD-10-CM

## 2021-09-08 RX ORDER — GABAPENTIN 300 MG/1
CAPSULE ORAL
Qty: 30 CAPSULE | Refills: 11 | OUTPATIENT
Start: 2021-09-08

## 2021-09-23 ENCOUNTER — MEDICAL CORRESPONDENCE (OUTPATIENT)
Dept: HEALTH INFORMATION MANAGEMENT | Facility: CLINIC | Age: 66
End: 2021-09-23

## 2021-10-25 ENCOUNTER — TELEPHONE (OUTPATIENT)
Dept: INTERNAL MEDICINE | Facility: CLINIC | Age: 66
End: 2021-10-25

## 2021-10-25 NOTE — TELEPHONE ENCOUNTER
Patient calling to request that the documents faxed 10/25/21 to the clinic for the patient's special diet verification be faxed to [facility] at [phone]. Please fax for patient. Thank you.

## 2021-10-26 DIAGNOSIS — R07.89 OTHER CHEST PAIN: ICD-10-CM

## 2021-10-27 NOTE — TELEPHONE ENCOUNTER
gabapentin (NEURONTIN) 300 MG capsule  Last Written Prescription Date:  6/2/21  Last Fill Quantity: 90,   # refills: 1  Last Office Visit :7/2/21  Future Office visit: none    Routing refill request to provider for review/approval because: not on protocol

## 2021-10-29 RX ORDER — GABAPENTIN 300 MG/1
300 CAPSULE ORAL AT BEDTIME
Qty: 90 CAPSULE | Refills: 1 | Status: SHIPPED | OUTPATIENT
Start: 2021-11-04 | End: 2022-04-20

## 2021-11-10 DIAGNOSIS — E11.9 TYPE 2 DIABETES MELLITUS WITHOUT COMPLICATION, WITHOUT LONG-TERM CURRENT USE OF INSULIN (H): ICD-10-CM

## 2021-11-10 DIAGNOSIS — I10 ESSENTIAL HYPERTENSION: Primary | ICD-10-CM

## 2021-11-11 ENCOUNTER — MEDICAL CORRESPONDENCE (OUTPATIENT)
Dept: HEALTH INFORMATION MANAGEMENT | Facility: CLINIC | Age: 66
End: 2021-11-11
Payer: COMMERCIAL

## 2021-11-11 NOTE — TELEPHONE ENCOUNTER
Alpha-Lipoic Acid 300 MG CAPS    Last Written Prescription Date:  9/8/2020  Last Fill Quantity: ?,   # refills: ?  Last Office Visit :  7/2/2021  Future Office visit:  None  Routing refill request to provider for review/approval because:  Medication is reported/historical    aspirin 81 MG EC tablet  Last Written Prescription Date:  ?  Last Fill Quantity: ?,   # refills: ?  Last Office Visit :  7/2/2021  Future Office visit:  None  Routing refill request to provider for review/approval because:  Medication is reported/historical    coenzyme Q-10 (CO-Q10) 50 MG capsule  Last Written Prescription Date:  ?  Last Fill Quantity: ?,   # refills: ?  Last Office Visit :  7/2/2021  Future Office visit:  None  Routing refill request to provider for review/approval because:  Not on updated med list    Glucosamine  Last Written Prescription Date:  ?  Last Fill Quantity: ?,   # refills: ?  Last Office Visit :  7/2/2021  Future Office visit:  None  Routing refill request to provider for review/approval because:  Not on updated med list    Leona Chamberlain RN  Central Triage Red Flags/Med Refills

## 2021-11-23 RX ORDER — RIBOFLAVIN (VITAMIN B2) 100 MG
1 TABLET ORAL DAILY
Qty: 90 TABLET | Refills: 3 | Status: SHIPPED | OUTPATIENT
Start: 2021-11-23 | End: 2022-04-20

## 2021-11-23 RX ORDER — ASPIRIN 81 MG/1
81 TABLET ORAL DAILY
Qty: 90 TABLET | Refills: 3 | Status: SHIPPED | OUTPATIENT
Start: 2021-11-23 | End: 2022-04-20

## 2021-11-23 RX ORDER — ALPHA LIPOIC ACID 300 MG
300 CAPSULE ORAL DAILY
Qty: 90 CAPSULE | Refills: 3 | Status: SHIPPED | OUTPATIENT
Start: 2021-11-23 | End: 2022-04-20

## 2021-11-29 ENCOUNTER — LAB (OUTPATIENT)
Dept: LAB | Facility: CLINIC | Age: 66
End: 2021-11-29
Attending: INTERNAL MEDICINE

## 2021-11-29 ENCOUNTER — ANCILLARY PROCEDURE (OUTPATIENT)
Dept: CT IMAGING | Facility: CLINIC | Age: 66
End: 2021-11-29
Attending: INTERNAL MEDICINE
Payer: COMMERCIAL

## 2021-11-29 DIAGNOSIS — C34.91 ADENOCARCINOMA OF RIGHT LUNG (H): ICD-10-CM

## 2021-11-29 LAB
ALBUMIN SERPL-MCNC: 3.6 G/DL (ref 3.4–5)
ALP SERPL-CCNC: 108 U/L (ref 40–150)
ALT SERPL W P-5'-P-CCNC: 18 U/L (ref 0–50)
ANION GAP SERPL CALCULATED.3IONS-SCNC: 4 MMOL/L (ref 3–14)
AST SERPL W P-5'-P-CCNC: 11 U/L (ref 0–45)
BASOPHILS # BLD AUTO: 0.1 10E3/UL (ref 0–0.2)
BASOPHILS NFR BLD AUTO: 1 %
BILIRUB SERPL-MCNC: 0.4 MG/DL (ref 0.2–1.3)
BUN SERPL-MCNC: 8 MG/DL (ref 7–30)
CALCIUM SERPL-MCNC: 9.7 MG/DL (ref 8.5–10.1)
CHLORIDE BLD-SCNC: 106 MMOL/L (ref 94–109)
CO2 SERPL-SCNC: 32 MMOL/L (ref 20–32)
CREAT SERPL-MCNC: 0.53 MG/DL (ref 0.52–1.04)
EOSINOPHIL # BLD AUTO: 0.3 10E3/UL (ref 0–0.7)
EOSINOPHIL NFR BLD AUTO: 4 %
ERYTHROCYTE [DISTWIDTH] IN BLOOD BY AUTOMATED COUNT: 12.5 % (ref 10–15)
GFR SERPL CREATININE-BSD FRML MDRD: >90 ML/MIN/1.73M2
GLUCOSE BLD-MCNC: 176 MG/DL (ref 70–99)
HCT VFR BLD AUTO: 45.2 % (ref 35–47)
HGB BLD-MCNC: 14.2 G/DL (ref 11.7–15.7)
IMM GRANULOCYTES # BLD: 0 10E3/UL
IMM GRANULOCYTES NFR BLD: 0 %
LYMPHOCYTES # BLD AUTO: 1.9 10E3/UL (ref 0.8–5.3)
LYMPHOCYTES NFR BLD AUTO: 29 %
MCH RBC QN AUTO: 28.1 PG (ref 26.5–33)
MCHC RBC AUTO-ENTMCNC: 31.4 G/DL (ref 31.5–36.5)
MCV RBC AUTO: 89 FL (ref 78–100)
MONOCYTES # BLD AUTO: 0.6 10E3/UL (ref 0–1.3)
MONOCYTES NFR BLD AUTO: 9 %
NEUTROPHILS # BLD AUTO: 3.8 10E3/UL (ref 1.6–8.3)
NEUTROPHILS NFR BLD AUTO: 57 %
NRBC # BLD AUTO: 0 10E3/UL
NRBC BLD AUTO-RTO: 0 /100
PLATELET # BLD AUTO: 197 10E3/UL (ref 150–450)
POTASSIUM BLD-SCNC: 4.1 MMOL/L (ref 3.4–5.3)
PROT SERPL-MCNC: 7.5 G/DL (ref 6.8–8.8)
RBC # BLD AUTO: 5.06 10E6/UL (ref 3.8–5.2)
SODIUM SERPL-SCNC: 142 MMOL/L (ref 133–144)
WBC # BLD AUTO: 6.5 10E3/UL (ref 4–11)

## 2021-11-29 PROCEDURE — 85025 COMPLETE CBC W/AUTO DIFF WBC: CPT | Performed by: PATHOLOGY

## 2021-11-29 PROCEDURE — 71250 CT THORAX DX C-: CPT | Performed by: RADIOLOGY

## 2021-11-29 PROCEDURE — 36415 COLL VENOUS BLD VENIPUNCTURE: CPT | Performed by: PATHOLOGY

## 2021-11-29 PROCEDURE — 80053 COMPREHEN METABOLIC PANEL: CPT | Performed by: PATHOLOGY

## 2021-11-29 NOTE — PROGRESS NOTES
[facility]IN[facility]  FOLLOW-UP VISIT NOTE    PATIENT NAME: Jenn Aparicio  MRN # [id number]   DATE OF VISIT: November 30, 2021  YOB: 1955     CANCER TYPE: Lung adenocarcinoma  STAGE: IA2 [id number] poorly diff adeno RLL, then [id number] IA2 suspected NSCLC RUL, medically inoperable     ECOG PS: 2    PD-L1: N/A  Lung panel: N/A  NGS: N/A    SUMMARY  12/24/15 PET/CT  1/13/15 CT lung bx. Adenocarcinoma  2/8/16 R thoracotomy, RLL lobectomy (Dr. Cunha). Adenocarcinoma, 1.1 cm, assoicated with atypical adenomatous hyperplasia  10/18/19 CTA for shortness of breath. New 1.3 cm RUL nodule  11/2019 PET/CT. 1.1 cm RUL hypermetabolic nodule (SUV 12.6)  12/26/19 Brain MRI negative for mets  1/14~1/28/20 SBRT to RUL nodule, 5000 cGy, every other day, 1000 cGy (Dr. Currie at [facility] [place] No bx due to O2 dependence and too much risk  8/19/20 First visit with me       ASSESSMENT AND PLAN   NSCLC, adeno, RUL: Now almost 2 years after SBRT. There's a new nodule in the RLL. I reviewed the July 2021 CT and it wasn't there. The tiny RUL looks unchanged. Discussed ddx, including inflammation, new cancer. Doesn't look like a mucus plug. Discussed that it's in a position that I think it could be bx'd percutaneously if needed but IR would need to weigh the risks. Repeat CT chest non contrast in 6-7 weeks, week of 1/17.    LLE swelling: Hard to tell over the phone whether it's due to the knee, but the swelling goes all of the way down to the ankle. LLE US to look for DVT today or tomorrow at the latest. Discussed what a DVT was, and how it's treated, either with apixaban or rivaroxaban. Also discussed potential reasons they happen. She's fairly immobile, which would be the biggest risk factor we know of right now. Will call her with results.     COPD: Per Dr. Nettles. Can't make it to a pulm appt or sleep study as recommended due to social issues    Housing: Hoping to have a new place in [place] in the next month or two    Review of the  result(s) of each unique test - CMP, CBC Pd,   Independent interpretation of a test performed by another physician/other qualified health care professional (not separately reported) - CT chest   Diagnosis or treatment significantly limited by social determinants of health - limited resources    25 minutes spent on this phone call    Zarina Leung MD  Associate Professor of Medicine  Hematology, Oncology and Transplantation      SUBJECTIVE  Jenn returns today for follow up of h/o lung cancer now 1 year and 8 months after SBRT to the RUL.     Doing ok overall. L knee has been causing problems - hurts, swelling, making it more difficult to walk. Trying topicals. Started a month ago, staying about the same. Knee feels warm sometimes but no F/C. Swelling goes down to the ankle. Feels tight when bending. Breathing a little more difficult with the heat on. Using duonebs twice daily. Can walk a half a block before getting too winded. 3L O2 at night, and often during the day as well, certainly when out and about. Still hoping to move but has stalled. Has to force herself to eat, not particularly hungry. Bowels and bladder working ok.    PAST MEDICAL HISTORY  Lung adenocarcinoma  DM2  HCV IA  COPD. O2 dependent since late 2018. PFT 11/26/19. FEV1 0.64 (30%), FVC 1.69 (63%), DLCOunc 9.8 (38%)  HTN  H/o ITP  H/o disk herniation  Ankle surgery  Median neuropathy R  H/o sessile colon polyps 2014, h/o adenomas before that. [place] 2/7/18 @ [facility] [place]. Sessile serated adenoma x 1, tubular adenoma x 3  H/o chronic LBP  Dyslipidemia  Cataract    CURRENT OUTPATIENT MEDICATIONS  Current Outpatient Medications   Medication Sig Dispense Refill     albuterol (PROAIR HFA/PROVENTIL HFA/VENTOLIN HFA) 108 (90 Base) MCG/ACT inhaler INHALE 1 OR 2 PUFFS BY MOUTH EVERY 4 HOURS AS NEEDED 18 g 7     Alpha-Lipoic Acid 300 MG CAPS Take 300 mg by mouth daily 90 capsule 3     amLODIPine (NORVASC) 10 MG tablet Take 1 tablet (10 mg) by mouth daily 90  tablet 3     aspirin 81 MG EC tablet Take 1 tablet (81 mg) by mouth daily 90 tablet 3     cetirizine (ZYRTEC) 10 MG tablet Take 1 tablet (10 mg) by mouth daily 30 tablet 10     coenzyme Q-10 (CO-Q10) 50 MG capsule Take 1 capsule (50 mg) by mouth daily 90 capsule 3     fluticasone-vilanterol (BREO ELLIPTA) 200-25 MCG/INH inhaler Inhale 1 puff into the lungs daily 1 each 11     gabapentin (NEURONTIN) 300 MG capsule Take 1 capsule (300 mg) by mouth At Bedtime 90 capsule 1     glipiZIDE (GLUCOTROL XL) 2.5 MG 24 hr tablet Take 1 tablet (2.5 mg) by mouth daily 60 tablet 3     glucosamine-chondroitinoitin 500-400 MG tablet Take 1 tablet by mouth daily 90 tablet 3     ipratropium - albuterol 0.5 mg/2.5 mg/3 mL (DUONEB) 0.5-2.5 (3) MG/3ML neb solution Take 1 vial (3 mLs) by nebulization every 6 hours as needed for shortness of breath / dyspnea or wheezing 1080 mL 0     Lidocaine (SALONPAS PAIN RELIEVING) 4 % Patch Place onto the skin every 24 hours 7 patch 11     sodium chloride (NEBUSAL) 3 % neb solution Take 3 mLs by nebulization daily Use 1-2 times daily in combination with Duoneb and Aerobika to help with mucus clearance 250 mL 3     umeclidinium (INCRUSE ELLIPTA) 62.5 MCG/INH inhaler Inhale 1 puff into the lungs daily 1 each 3     vitamin D3 (CHOLECALCIFEROL) 50 mcg (2000 units) tablet Take by mouth daily Patient is unsure of dose but takes one tablet daily and things it is 50 mcg       benzonatate (TESSALON) 200 MG capsule Take 1 capsule (200 mg) by mouth 3 times daily as needed for cough (Patient not taking: Reported on 11/30/2021) 100 capsule 3     doxycycline hyclate (VIBRAMYCIN) 100 MG capsule Take 100 mg twice daily for five days if needed for treatment of a COPD exacerbation. Use alongside prednisone. (Patient not taking: Reported on 11/30/2021) 10 capsule 0     guaiFENesin (ROBITUSSIN) 20 mg/mL SOLN solution Take 10 mLs by mouth every 4 hours as needed for cough (Patient not taking: Reported on 11/30/2021) 118  mL 0     multivitamin w/minerals (THERA-VIT-M) tablet Take 1 tablet by mouth daily 30 tablet 0     nicotine (NICODERM CQ) 14 MG/24HR 24 hr patch Place 1 patch onto the skin every 24 hours (Patient not taking: Reported on 11/30/2021) 84 patch 1     Nutritional Supplements (ENSURE HIGH PROTEIN) Take 1 Can by mouth 3 times daily (with meals) 37037 mL 0     predniSONE (DELTASONE) 20 MG tablet Take 40 mg daily for 5 days if needed for treatment of a COPD exacerbation, use alongside doxycycline (Patient not taking: Reported on 7/2/2021) 10 tablet 0     ALLERGIES  Allergies   Allergen Reactions     Interferons Dermatitis     Penicillins Hives     Aspirin      325mg      Codeine      Colon Care       REVIEW OF SYSTEMS  As above in the HPI, o/w complete 12-point ROS was negative.    PHYSICAL EXAM  No vitals due to telephone visit  Wt Readings from Last 3 Encounters:   05/25/21 75 kg (165 lb 6.4 oz)   05/05/21 74.4 kg (164 lb)   05/05/21 74.8 kg (164 lb 14.4 oz)     GEN: NAD, voice unchanged    Remainder of physical exam deferred due to public health emergency and limitations of telephone visit.    LABORATORY AND IMAGING STUDIES   11/29/2021 09:49   Sodium 142   Potassium 4.1   Chloride 106   Carbon Dioxide 32   Urea Nitrogen 8   Creatinine 0.53   GFR Estimate >90   Calcium 9.7   Anion Gap 4   Albumin 3.6   Protein Total 7.5   Bilirubin Total 0.4   Alkaline Phosphatase 108   ALT 18   AST 11   Glucose 176 (H)   WBC 6.5   Hemoglobin 14.2   Hematocrit 45.2   Platelet Count 197   RBC Count 5.06   MCV 89   MCH 28.1   MCHC 31.4 (L)   RDW 12.5   % Neutrophils 57   % Lymphocytes 29   % Monocytes 9   % Eosinophils 4   % Basophils 1   Absolute Basophils 0.1   Absolute Eosinophils 0.3   Absolute Immature Granulocytes 0.0   Absolute Lymphocytes 1.9   Absolute Monocytes 0.6   % Immature Granulocytes 0   Absolute Neutrophils 3.8   Absolute NRBCs 0.0   NRBCs per 100 WBC 0     Labs were independently reviewed and interpreted by me    CT  Chest w/o Contrast  Narrative: CT chest without contrast    INDICATION: Restage history of non-small cell lung cancer post SBRT in  12/2020.    COMPARISON: 7/18/2021    FINDINGS: Included thyroid appears normal. Thoracic aortic and  coronary artery calcifications. Mild abdominal aortic calcifications.  Probable phlebolith incidentally noted at the left kidney. Wide common  origin of the right brachiocephalic and left common carotid arteries  from the aortic arch. Adrenals appear normal. No enlarged axillary,  mediastinal or hilar lymph nodes. No pleural or pericardial effusion.  Degenerative changes in the thoracic spine. Emphysematous changes  again in both lungs. The 7 mm opacity surrounding airway versus  cavitary nodule is unchanged (series 4 image 35). Mosaic attenuation  in both lungs again noted. New posterior right upper lobe nodule  (series 4 image 125) measuring 10 mm. Other scattered areas of lung  scarring. Posterior right upper lobe probable evolving radiation  changes appear similar.  Impression: IMPRESSION: Evolving radiation changes in right upper lobe. New 10 mm  nodular opacity elsewhere in the right upper lobe, recommend PET/CT  scan. Atherosclerosis. Emphysematous changes in the lungs. Unchanged 7  mm right apical cavitary opacity.    MARK BARRERA MD         SYSTEM ID:  [id number]     Imaging was personally reviewed and interpreted by me as above.     Jenn is a 66 year old who is being evaluated via a billable telephone visit.      What phone number would you like to be contacted at? [phone]   How would you like to obtain your AVS? Mail a copy

## 2021-11-30 ENCOUNTER — VIRTUAL VISIT (OUTPATIENT)
Dept: ONCOLOGY | Facility: CLINIC | Age: 66
End: 2021-11-30
Attending: INTERNAL MEDICINE
Payer: COMMERCIAL

## 2021-11-30 DIAGNOSIS — M79.89 LEG SWELLING: ICD-10-CM

## 2021-11-30 DIAGNOSIS — C34.91 ADENOCARCINOMA OF RIGHT LUNG (H): Primary | ICD-10-CM

## 2021-11-30 PROCEDURE — 999N001193 HC VIDEO/TELEPHONE VISIT; NO CHARGE

## 2021-11-30 PROCEDURE — 99213 OFFICE O/P EST LOW 20 MIN: CPT | Mod: 95 | Performed by: INTERNAL MEDICINE

## 2021-11-30 NOTE — LETTER
11/30/2021         RE: Jenn Aparicio  [address]  [place] [address]        Dear Colleague,    Thank you for referring your patient, Jenn Aparicio, to the [facility]. Please see a copy of my visit note below.      [facility] CANCER [facility]  FOLLOW-UP VISIT NOTE    PATIENT NAME: Jenn Aparicio  MRN # [id number]   DATE OF VISIT: November 30, 2021  YOB: 1955     CANCER TYPE: Lung adenocarcinoma  STAGE: IA2 [id number] poorly diff adeno RLL, then [id number] IA2 suspected NSCLC RUL, medically inoperable     ECOG PS: 2    PD-L1: N/A  Lung panel: N/A  NGS: N/A    SUMMARY  12/24/15 PET/CT  1/13/15 CT lung bx. Adenocarcinoma  2/8/16 R thoracotomy, RLL lobectomy (Dr. Cunha). Adenocarcinoma, 1.1 cm, assoicated with atypical adenomatous hyperplasia  10/18/19 CTA for shortness of breath. New 1.3 cm RUL nodule  11/2019 PET/CT. 1.1 cm RUL hypermetabolic nodule (SUV 12.6)  12/26/19 Brain MRI negative for mets  1/14~1/28/20 SBRT to RUL nodule, 5000 cGy, every other day, 1000 cGy (Dr. Currie at [facility] [place] No bx due to O2 dependence and too much risk  8/19/20 First visit with me       ASSESSMENT AND PLAN   NSCLC, adeno, RUL: Now almost 2 years after SBRT. There's a new nodule in the RLL. I reviewed the July 2021 CT and it wasn't there. The tiny RUL looks unchanged. Discussed ddx, including inflammation, new cancer. Doesn't look like a mucus plug. Discussed that it's in a position that I think it could be bx'd percutaneously if needed but IR would need to weigh the risks. Repeat CT chest non contrast in 6-7 weeks, week of 1/17.    LLE swelling: Hard to tell over the phone whether it's due to the knee, but the swelling goes all of the way down to the ankle. LLE US to look for DVT today or tomorrow at the latest. Discussed what a DVT was, and how it's treated, either with apixaban or rivaroxaban. Also discussed potential reasons they happen. She's fairly immobile, which would be the biggest risk factor  we know of right now. Will call her with results.     COPD: Per Dr. Nettles. Can't make it to a pulm appt or sleep study as recommended due to social issues    Housing: Hoping to have a new place in [place] in the next month or two    Review of the result(s) of each unique test - CMP, CBC Pd,   Independent interpretation of a test performed by another physician/other qualified health care professional (not separately reported) - CT chest   Diagnosis or treatment significantly limited by social determinants of health - limited resources    25 minutes spent on this phone call    Zarina Leung MD  Associate Professor of Medicine  Hematology, Oncology and Transplantation      SUBJECTIVE  Jenn returns today for follow up of h/o lung cancer now 1 year and 8 months after SBRT to the RUL.     Doing ok overall. L knee has been causing problems - hurts, swelling, making it more difficult to walk. Trying topicals. Started a month ago, staying about the same. Knee feels warm sometimes but no F/C. Swelling goes down to the ankle. Feels tight when bending. Breathing a little more difficult with the heat on. Using duonebs twice daily. Can walk a half a block before getting too winded. 3L O2 at night, and often during the day as well, certainly when out and about. Still hoping to move but has stalled. Has to force herself to eat, not particularly hungry. Bowels and bladder working ok.    PAST MEDICAL HISTORY  Lung adenocarcinoma  DM2  HCV IA  COPD. O2 dependent since late 2018. PFT 11/26/19. FEV1 0.64 (30%), FVC 1.69 (63%), DLCOunc 9.8 (38%)  HTN  H/o ITP  H/o disk herniation  Ankle surgery  Median neuropathy R  H/o sessile colon polyps 2014, h/o adenomas before that. Colonoscopy 2/7/18 @ [facility] [place]. Sessile serated adenoma x 1, tubular adenoma x 3  H/o chronic LBP  Dyslipidemia  Cataract    CURRENT OUTPATIENT MEDICATIONS  Current Outpatient Medications   Medication Sig Dispense Refill     albuterol (PROAIR HFA/PROVENTIL HFA/VENTOLIN  HFA) 108 (90 Base) MCG/ACT inhaler INHALE 1 OR 2 PUFFS BY MOUTH EVERY 4 HOURS AS NEEDED 18 g 7     Alpha-Lipoic Acid 300 MG CAPS Take 300 mg by mouth daily 90 capsule 3     amLODIPine (NORVASC) 10 MG tablet Take 1 tablet (10 mg) by mouth daily 90 tablet 3     aspirin 81 MG EC tablet Take 1 tablet (81 mg) by mouth daily 90 tablet 3     cetirizine (ZYRTEC) 10 MG tablet Take 1 tablet (10 mg) by mouth daily 30 tablet 10     coenzyme Q-10 (CO-Q10) 50 MG capsule Take 1 capsule (50 mg) by mouth daily 90 capsule 3     fluticasone-vilanterol (BREO ELLIPTA) 200-25 MCG/INH inhaler Inhale 1 puff into the lungs daily 1 each 11     gabapentin (NEURONTIN) 300 MG capsule Take 1 capsule (300 mg) by mouth At Bedtime 90 capsule 1     glipiZIDE (GLUCOTROL XL) 2.5 MG 24 hr tablet Take 1 tablet (2.5 mg) by mouth daily 60 tablet 3     glucosamine-chondroitinoitin 500-400 MG tablet Take 1 tablet by mouth daily 90 tablet 3     ipratropium - albuterol 0.5 mg/2.5 mg/3 mL (DUONEB) 0.5-2.5 (3) MG/3ML neb solution Take 1 vial (3 mLs) by nebulization every 6 hours as needed for shortness of breath / dyspnea or wheezing 1080 mL 0     Lidocaine (SALONPAS PAIN RELIEVING) 4 % Patch Place onto the skin every 24 hours 7 patch 11     sodium chloride (NEBUSAL) 3 % neb solution Take 3 mLs by nebulization daily Use 1-2 times daily in combination with Duoneb and Aerobika to help with mucus clearance 250 mL 3     umeclidinium (INCRUSE ELLIPTA) 62.5 MCG/INH inhaler Inhale 1 puff into the lungs daily 1 each 3     vitamin D3 (CHOLECALCIFEROL) 50 mcg (2000 units) tablet Take by mouth daily Patient is unsure of dose but takes one tablet daily and things it is 50 mcg       benzonatate (TESSALON) 200 MG capsule Take 1 capsule (200 mg) by mouth 3 times daily as needed for cough (Patient not taking: Reported on 11/30/2021) 100 capsule 3     doxycycline hyclate (VIBRAMYCIN) 100 MG capsule Take 100 mg twice daily for five days if needed for treatment of a COPD  exacerbation. Use alongside prednisone. (Patient not taking: Reported on 11/30/2021) 10 capsule 0     guaiFENesin (ROBITUSSIN) 20 mg/mL SOLN solution Take 10 mLs by mouth every 4 hours as needed for cough (Patient not taking: Reported on 11/30/2021) 118 mL 0     multivitamin w/minerals (THERA-VIT-M) tablet Take 1 tablet by mouth daily 30 tablet 0     nicotine (NICODERM CQ) 14 MG/24HR 24 hr patch Place 1 patch onto the skin every 24 hours (Patient not taking: Reported on 11/30/2021) 84 patch 1     Nutritional Supplements (ENSURE HIGH PROTEIN) Take 1 Can by mouth 3 times daily (with meals) 70817 mL 0     predniSONE (DELTASONE) 20 MG tablet Take 40 mg daily for 5 days if needed for treatment of a COPD exacerbation, use alongside doxycycline (Patient not taking: Reported on 7/2/2021) 10 tablet 0     ALLERGIES  Allergies   Allergen Reactions     Interferons Dermatitis     Penicillins Hives     Aspirin      325mg      Codeine      Colon Care       REVIEW OF SYSTEMS  As above in the HPI, o/w complete 12-point ROS was negative.    PHYSICAL EXAM  No vitals due to telephone visit  Wt Readings from Last 3 Encounters:   05/25/21 75 kg (165 lb 6.4 oz)   05/05/21 74.4 kg (164 lb)   05/05/21 74.8 kg (164 lb 14.4 oz)     GEN: NAD, voice unchanged    Remainder of physical exam deferred due to public health emergency and limitations of telephone visit.    LABORATORY AND IMAGING STUDIES   11/29/2021 09:49   Sodium 142   Potassium 4.1   Chloride 106   Carbon Dioxide 32   Urea Nitrogen 8   Creatinine 0.53   GFR Estimate >90   Calcium 9.7   Anion Gap 4   Albumin 3.6   Protein Total 7.5   Bilirubin Total 0.4   Alkaline Phosphatase 108   ALT 18   AST 11   Glucose 176 (H)   WBC 6.5   Hemoglobin 14.2   Hematocrit 45.2   Platelet Count 197   RBC Count 5.06   MCV 89   MCH 28.1   MCHC 31.4 (L)   RDW 12.5   % Neutrophils 57   % Lymphocytes 29   % Monocytes 9   % Eosinophils 4   % Basophils 1   Absolute Basophils 0.1   Absolute Eosinophils 0.3    Absolute Immature Granulocytes 0.0   Absolute Lymphocytes 1.9   Absolute Monocytes 0.6   % Immature Granulocytes 0   Absolute Neutrophils 3.8   Absolute NRBCs 0.0   NRBCs per 100 WBC 0     Labs were independently reviewed and interpreted by me    CT Chest w/o Contrast  Narrative: CT chest without contrast    INDICATION: Restage history of non-small cell lung cancer post SBRT in  12/2020.    COMPARISON: 7/18/2021    FINDINGS: Included thyroid appears normal. Thoracic aortic and  coronary artery calcifications. Mild abdominal aortic calcifications.  Probable phlebolith incidentally noted at the left kidney. Wide common  origin of the right brachiocephalic and left common carotid arteries  from the aortic arch. Adrenals appear normal. No enlarged axillary,  mediastinal or hilar lymph nodes. No pleural or pericardial effusion.  Degenerative changes in the thoracic spine. Emphysematous changes  again in both lungs. The 7 mm opacity surrounding airway versus  cavitary nodule is unchanged (series 4 image 35). Mosaic attenuation  in both lungs again noted. New posterior right upper lobe nodule  (series 4 image 125) measuring 10 mm. Other scattered areas of lung  scarring. Posterior right upper lobe probable evolving radiation  changes appear similar.  Impression: IMPRESSION: Evolving radiation changes in right upper lobe. New 10 mm  nodular opacity elsewhere in the right upper lobe, recommend PET/CT  scan. Atherosclerosis. Emphysematous changes in the lungs. Unchanged 7  mm right apical cavitary opacity.    MARK BARRERA MD         SYSTEM ID:  [id number]     Imaging was personally reviewed and interpreted by me as above.     Zarina Leung MD

## 2021-12-06 DIAGNOSIS — Z72.0 CURRENT TOBACCO USE: Primary | ICD-10-CM

## 2021-12-08 ENCOUNTER — ANCILLARY PROCEDURE (OUTPATIENT)
Dept: ULTRASOUND IMAGING | Facility: CLINIC | Age: 66
End: 2021-12-08
Attending: INTERNAL MEDICINE
Payer: COMMERCIAL

## 2021-12-08 DIAGNOSIS — C34.91 ADENOCARCINOMA OF RIGHT LUNG (H): ICD-10-CM

## 2021-12-08 DIAGNOSIS — M79.89 LEG SWELLING: ICD-10-CM

## 2021-12-08 PROCEDURE — 93971 EXTREMITY STUDY: CPT | Mod: LT | Performed by: RADIOLOGY

## 2021-12-08 RX ORDER — NICOTINE 21 MG/24HR
1 PATCH, TRANSDERMAL 24 HOURS TRANSDERMAL EVERY 24 HOURS
Qty: 30 PATCH | Refills: 6 | Status: ON HOLD | OUTPATIENT
Start: 2021-12-08 | End: 2022-05-24

## 2021-12-09 ENCOUNTER — PATIENT OUTREACH (OUTPATIENT)
Dept: ONCOLOGY | Facility: CLINIC | Age: 66
End: 2021-12-09
Payer: COMMERCIAL

## 2021-12-09 NOTE — PROGRESS NOTES
RN Care Coordination Note  Placed call to patient to review Dr Leung reviewed US LE. No concerns for DVT. If swelling continues to not improve or worse, would like patient to follow-up with  PCP.     Patient voiced understanding and had no additional questions at this time.     Bernadette Garcias RN, BSN, OCN   RN Care Coordinator   [facility]

## 2022-01-03 DIAGNOSIS — C34.90 ADENOCARCINOMA OF LUNG, UNSPECIFIED LATERALITY (H): ICD-10-CM

## 2022-01-04 RX ORDER — ALBUTEROL SULFATE 90 UG/1
AEROSOL, METERED RESPIRATORY (INHALATION)
Qty: 18 G | Refills: 3 | Status: SHIPPED | OUTPATIENT
Start: 2022-01-04 | End: 2022-04-20

## 2022-01-07 ENCOUNTER — TELEPHONE (OUTPATIENT)
Dept: INTERNAL MEDICINE | Facility: CLINIC | Age: 67
End: 2022-01-07
Payer: COMMERCIAL

## 2022-01-07 NOTE — TELEPHONE ENCOUNTER
[facility] Call Center    Phone Message    May a detailed message be left on voicemail: yes     Reason for Call: Medication Question or concern regarding medication   Prescription Clarification  Name of Medication: nicotine (SM NICOTINE) 14 MG/24HR 24 hr patch  Prescribing Provider: Mitzi Nettles   Pharmacy:   [place] [phone]        What on the order needs clarification?  Pharmacy is looking for clarification on dosing.  directions state that dose should be decreased from 14mg to 7 mg for weeks 2-6?       Please call pharmacy back to discuss.                   Action Taken: Message routed to:  [facility]    Travel Screening: Not Applicable

## 2022-01-11 ENCOUNTER — ANCILLARY PROCEDURE (OUTPATIENT)
Dept: CT IMAGING | Facility: CLINIC | Age: 67
End: 2022-01-11
Attending: INTERNAL MEDICINE
Payer: COMMERCIAL

## 2022-01-11 ENCOUNTER — ONCOLOGY VISIT (OUTPATIENT)
Dept: ONCOLOGY | Facility: CLINIC | Age: 67
End: 2022-01-11
Attending: INTERNAL MEDICINE
Payer: COMMERCIAL

## 2022-01-11 ENCOUNTER — TELEPHONE (OUTPATIENT)
Dept: INTERNAL MEDICINE | Facility: CLINIC | Age: 67
End: 2022-01-11

## 2022-01-11 VITALS
OXYGEN SATURATION: 99 % | WEIGHT: 221.5 LBS | HEIGHT: 66 IN | BODY MASS INDEX: 35.6 KG/M2 | RESPIRATION RATE: 16 BRPM | HEART RATE: 94 BPM | DIASTOLIC BLOOD PRESSURE: 80 MMHG | TEMPERATURE: 98 F | SYSTOLIC BLOOD PRESSURE: 149 MMHG

## 2022-01-11 DIAGNOSIS — Z13.29 SCREENING FOR HYPOTHYROIDISM: Primary | ICD-10-CM

## 2022-01-11 DIAGNOSIS — C34.91 ADENOCARCINOMA OF RIGHT LUNG (H): ICD-10-CM

## 2022-01-11 DIAGNOSIS — Z13.220 SCREENING FOR HYPERLIPIDEMIA: ICD-10-CM

## 2022-01-11 DIAGNOSIS — E11.9 TYPE 2 DIABETES MELLITUS WITHOUT COMPLICATION, WITHOUT LONG-TERM CURRENT USE OF INSULIN (H): ICD-10-CM

## 2022-01-11 LAB
ALBUMIN SERPL-MCNC: 3.6 G/DL (ref 3.4–5)
ALP SERPL-CCNC: 98 U/L (ref 40–150)
ALT SERPL W P-5'-P-CCNC: 16 U/L (ref 0–50)
ANION GAP SERPL CALCULATED.3IONS-SCNC: 6 MMOL/L (ref 3–14)
AST SERPL W P-5'-P-CCNC: 9 U/L (ref 0–45)
BASOPHILS # BLD AUTO: 0 10E3/UL (ref 0–0.2)
BASOPHILS NFR BLD AUTO: 1 %
BILIRUB SERPL-MCNC: 0.3 MG/DL (ref 0.2–1.3)
BUN SERPL-MCNC: 9 MG/DL (ref 7–30)
CALCIUM SERPL-MCNC: 9.1 MG/DL (ref 8.5–10.1)
CHLORIDE BLD-SCNC: 106 MMOL/L (ref 94–109)
CHOLEST SERPL-MCNC: 224 MG/DL
CO2 SERPL-SCNC: 30 MMOL/L (ref 20–32)
CREAT SERPL-MCNC: 0.53 MG/DL (ref 0.52–1.04)
CREAT UR-MCNC: 144 MG/DL
EOSINOPHIL # BLD AUTO: 0.2 10E3/UL (ref 0–0.7)
EOSINOPHIL NFR BLD AUTO: 4 %
ERYTHROCYTE [DISTWIDTH] IN BLOOD BY AUTOMATED COUNT: 12.5 % (ref 10–15)
FASTING STATUS PATIENT QL REPORTED: YES
GFR SERPL CREATININE-BSD FRML MDRD: >90 ML/MIN/1.73M2
GLUCOSE BLD-MCNC: 171 MG/DL (ref 70–99)
HBA1C MFR BLD: 8.3 % (ref 0–5.6)
HCT VFR BLD AUTO: 45 % (ref 35–47)
HDLC SERPL-MCNC: 55 MG/DL
HGB BLD-MCNC: 14.1 G/DL (ref 11.7–15.7)
IMM GRANULOCYTES # BLD: 0 10E3/UL
IMM GRANULOCYTES NFR BLD: 0 %
LDLC SERPL CALC-MCNC: 149 MG/DL
LYMPHOCYTES # BLD AUTO: 1.9 10E3/UL (ref 0.8–5.3)
LYMPHOCYTES NFR BLD AUTO: 30 %
MCH RBC QN AUTO: 28.3 PG (ref 26.5–33)
MCHC RBC AUTO-ENTMCNC: 31.3 G/DL (ref 31.5–36.5)
MCV RBC AUTO: 90 FL (ref 78–100)
MONOCYTES # BLD AUTO: 0.5 10E3/UL (ref 0–1.3)
MONOCYTES NFR BLD AUTO: 8 %
NEUTROPHILS # BLD AUTO: 3.8 10E3/UL (ref 1.6–8.3)
NEUTROPHILS NFR BLD AUTO: 57 %
NONHDLC SERPL-MCNC: 169 MG/DL
NRBC # BLD AUTO: 0 10E3/UL
NRBC BLD AUTO-RTO: 0 /100
PLATELET # BLD AUTO: 202 10E3/UL (ref 150–450)
POTASSIUM BLD-SCNC: 4.2 MMOL/L (ref 3.4–5.3)
PROT SERPL-MCNC: 7.2 G/DL (ref 6.8–8.8)
PROT UR-MCNC: 0.37 G/L
PROT/CREAT 24H UR: 0.26 G/G CR (ref 0–0.2)
RBC # BLD AUTO: 4.98 10E6/UL (ref 3.8–5.2)
SODIUM SERPL-SCNC: 142 MMOL/L (ref 133–144)
TRIGL SERPL-MCNC: 102 MG/DL
WBC # BLD AUTO: 6.6 10E3/UL (ref 4–11)

## 2022-01-11 PROCEDURE — 71250 CT THORAX DX C-: CPT | Mod: GC | Performed by: RADIOLOGY

## 2022-01-11 PROCEDURE — G0463 HOSPITAL OUTPT CLINIC VISIT: HCPCS

## 2022-01-11 PROCEDURE — 83036 HEMOGLOBIN GLYCOSYLATED A1C: CPT | Performed by: INTERNAL MEDICINE

## 2022-01-11 PROCEDURE — 80061 LIPID PANEL: CPT | Performed by: INTERNAL MEDICINE

## 2022-01-11 PROCEDURE — 80053 COMPREHEN METABOLIC PANEL: CPT | Performed by: INTERNAL MEDICINE

## 2022-01-11 PROCEDURE — 84156 ASSAY OF PROTEIN URINE: CPT | Performed by: INTERNAL MEDICINE

## 2022-01-11 PROCEDURE — 99215 OFFICE O/P EST HI 40 MIN: CPT | Performed by: INTERNAL MEDICINE

## 2022-01-11 PROCEDURE — 85004 AUTOMATED DIFF WBC COUNT: CPT | Performed by: INTERNAL MEDICINE

## 2022-01-11 PROCEDURE — 36415 COLL VENOUS BLD VENIPUNCTURE: CPT | Performed by: INTERNAL MEDICINE

## 2022-01-11 ASSESSMENT — MIFFLIN-ST. JEOR: SCORE: 1561.22

## 2022-01-11 ASSESSMENT — PAIN SCALES - GENERAL: PAINLEVEL: NO PAIN (0)

## 2022-01-11 NOTE — TELEPHONE ENCOUNTER
[facility] Call Center    Phone Message    May a detailed message be left on voicemail: yes     Reason for Call: Medication Question or concern regarding medication   Prescription Clarification  Name of Medication: Nicotine  Prescribing Provider: Mitzi LBAOY CNP   [facility] [place] [address]   What on the order needs clarification? Xenia calling to clarify the duration of days of the prescription. Please call to discuss further.           Action Taken: Message routed to:  [facility]    Travel Screening: Not Applicable

## 2022-01-11 NOTE — LETTER
1/11/2022         RE: Jenn Aparicio  [address]  [place] [address]        Dear Colleague,    Thank you for referring your patient, Jenn Aparicio, to the [facility]CER [facility]INIC. Please see a copy of my visit note below.       [facility] CANCER [facility]  FOLLOW-UP VISIT NOTE    PATIENT NAME: Jenn Aparicio  MRN # [id number]   DATE OF VISIT: January 11, 2022  YOB: 1955     CANCER TYPE: Lung adenocarcinoma  STAGE: IA2 [id number] poorly diff adeno RLL, then [id number] IA2 suspected NSCLC RUL, medically inoperable     ECOG PS: 1    PD-L1: N/A  Lung panel: N/A  NGS: N/A    SUMMARY  12/24/15 PET/CT  1/13/15 CT lung bx. Adenocarcinoma  2/8/16 R thoracotomy, RLL lobectomy (Dr. Cunha). Adenocarcinoma, 1.1 cm, assoicated with atypical adenomatous hyperplasia  10/18/19 CTA for shortness of breath. New 1.3 cm RUL nodule  11/2019 PET/CT. 1.1 cm RUL hypermetabolic nodule (SUV 12.6)  12/26/19 Brain MRI negative for mets  1/14~1/28/20 SBRT to RUL nodule, 5000 cGy, every other day, 1000 cGy [place] [facility] [place] No bx due to O2 dependence and too much risk  8/19/20 First visit with me     ASSESSMENT AND PLAN   NSCLC, adeno, RUL: Now 2 years after SBRT. New RLL nodule (see below) on Nov 2021 CT. CT later this morning. I'll call her with results. We discussed the idea of bx if it's persistent or certainly if it's larger, vs PET to start if it's >1 cm    ADDENDUM: The RLL nodule looks stable on my review. Confirmed on final read.     ADDENDUM #2: New 10 mm RUL nodule. It's not possible to tell whether inflammatory or potentially malignant. Will get another CT chest in 2 1/2-3 months and go from there. Called Joe after read was back.     LLE swelling: More in the thigh. Looks fairly symmetric to me compared to the right. LLE US 12/8/21 negative for DVT. No significant edema on exam. Monitor for now.     DM2: Hasn't had a chance to follow up with PCP and hasn't had an A1c checked for 6 years. Will check that  and urine protein/creat ratio, lipids, is fasting today. Has eye exam scheduled tomorrow. Appt with Dr. Nettles requested.    Peripheral neuropathy: Taking alpha-lipoic acid which has been helping but her insurance changed policies and now they're charging her. Taking gabapentin 300 mg at bedtime, and she'd like to stop due to the perceived lack of efficacy. We discussed this is an incredibly low dose, but she doesn't find the neuropathy interferes with her life enough that she wants to titrate up. Will message SW to see if there's a way to get any assistance for the alpha lipoic acid and stop gabapentin.    COPD: Per Dr. Nettles. Can't make it to a pulm appt or sleep study as recommended due to social issues    Housing: Hoping to have a new place in [facility].     Review of the result(s) of each unique test - CMp, CBC pd  Independent interpretation of a test performed by another physician/other qualified health care professional (not separately reported) - CT chest    30 minutes spent on the date of the encounter doing chart review, history and exam, documentation and further activities per the note     Zarina Leung MD  Associate Professor of Medicine  Hematology, Oncology and Transplantation      SUBJECTIVE  Jenn returns today for follow up of h/o lung cancer now 2 years after SBRT to the RUL. Doing ok overall. Still has intermittent breathing problems, especially with cold weather. Nothing new though. Has continued being very careful to avoid covid exposures, which has been challenging. Uses nebs when more short of breath.     PAST MEDICAL HISTORY  Lung adenocarcinoma  DM2 with neuropathy  HCV IA, undetectable in 2019, treated  COPD. O2 dependent since late 2018. PFT 11/26/19. FEV1 0.64 (30%), FVC 1.69 (63%), DLCOunc 9.8 (38%)  HTN  H/o ITP  H/o disk herniation  Ankle surgery  Median neuropathy R  H/o sessile colon polyps 2014, h/o adenomas before that. [place]oscopy 2/7/18 @ [facility] [place]. Manuel thomson  adenoma x 1, tubular adenoma x 3  H/o chronic LBP  Dyslipidemia  Cataract    CURRENT OUTPATIENT MEDICATIONS  Current Outpatient Medications   Medication Sig Dispense Refill     albuterol (PROAIR HFA/PROVENTIL HFA/VENTOLIN HFA) 108 (90 Base) MCG/ACT inhaler INHALE 1 OR 2 PUFFS BY MOUTH EVERY 4 HOURS AS NEEDED 18 g 3     Alpha-Lipoic Acid 300 MG CAPS Take 300 mg by mouth daily 90 capsule 3     amLODIPine (NORVASC) 10 MG tablet Take 1 tablet (10 mg) by mouth daily 90 tablet 3     aspirin 81 MG EC tablet Take 1 tablet (81 mg) by mouth daily 90 tablet 3     cetirizine (ZYRTEC) 10 MG tablet Take 1 tablet (10 mg) by mouth daily 30 tablet 10     coenzyme Q-10 (CO-Q10) 50 MG capsule Take 1 capsule (50 mg) by mouth daily 90 capsule 3     fluticasone-vilanterol (BREO ELLIPTA) 200-25 MCG/INH inhaler Inhale 1 puff into the lungs daily 1 each 11     gabapentin (NEURONTIN) 300 MG capsule Take 1 capsule (300 mg) by mouth At Bedtime 90 capsule 1     glipiZIDE (GLUCOTROL XL) 2.5 MG 24 hr tablet Take 1 tablet (2.5 mg) by mouth daily 60 tablet 3     glucosamine-chondroitinoitin 500-400 MG tablet Take 1 tablet by mouth daily 90 tablet 3     ipratropium - albuterol 0.5 mg/2.5 mg/3 mL (DUONEB) 0.5-2.5 (3) MG/3ML neb solution Take 1 vial (3 mLs) by nebulization every 6 hours as needed for shortness of breath / dyspnea or wheezing 1080 mL 0     Lidocaine (SALONPAS PAIN RELIEVING) 4 % Patch Place onto the skin every 24 hours 7 patch 11     multivitamin w/minerals (THERA-VIT-M) tablet Take 1 tablet by mouth daily 30 tablet 0     nicotine (SM NICOTINE) 14 MG/24HR 24 hr patch Place 1 patch onto the skin every 24 hours 30 patch 6     Nutritional Supplements (ENSURE HIGH PROTEIN) Take 1 Can by mouth 3 times daily (with meals) 01205 mL 0     sodium chloride (NEBUSAL) 3 % neb solution Take 3 mLs by nebulization daily Use 1-2 times daily in combination with Duoneb and Aerobika to help with mucus clearance 250 mL 3     umeclidinium (INCRUSE ELLIPTA)  "62.5 MCG/INH inhaler Inhale 1 puff into the lungs daily 1 each 3     vitamin D3 (CHOLECALCIFEROL) 50 mcg (2000 units) tablet Take by mouth daily Patient is unsure of dose but takes one tablet daily and things it is 50 mcg       benzonatate (TESSALON) 200 MG capsule Take 1 capsule (200 mg) by mouth 3 times daily as needed for cough (Patient not taking: Reported on 11/30/2021) 100 capsule 3     doxycycline hyclate (VIBRAMYCIN) 100 MG capsule Take 100 mg twice daily for five days if needed for treatment of a COPD exacerbation. Use alongside prednisone. (Patient not taking: Reported on 11/30/2021) 10 capsule 0     guaiFENesin (ROBITUSSIN) 20 mg/mL SOLN solution Take 10 mLs by mouth every 4 hours as needed for cough (Patient not taking: Reported on 11/30/2021) 118 mL 0     predniSONE (DELTASONE) 20 MG tablet Take 40 mg daily for 5 days if needed for treatment of a COPD exacerbation, use alongside doxycycline (Patient not taking: Reported on 7/2/2021) 10 tablet 0     ALLERGIES  Allergies   Allergen Reactions     Interferons Dermatitis     Penicillins Hives     Aspirin      325mg      Codeine      Colon Care       REVIEW OF SYSTEMS  As above in the HPI, o/w complete 12-point ROS was negative.    PHYSICAL EXAM  BP (!) 149/80   Pulse 94   Temp 98  F (36.7  C) (Oral)   Resp 16   Ht 1.676 m (5' 5.98\")   Wt 100.5 kg (221 lb 8 oz)   SpO2 99%   BMI 35.77 kg/m      GEN: NAD  HEENT: EOMI, no icterus, injection or pallor  LUNGS: clear bilaterally albeit decreased  CV: regular, no murmurs, rubs, or gallops  EXT: warm, no edema, symmetric   NEURO: alert  SKIN: no rashes    LABORATORY AND IMAGING STUDIES   1/11/2022 08:45   Sodium 142   Potassium 4.2   Chloride 106   Carbon Dioxide 30   Urea Nitrogen 9   Creatinine 0.53   GFR Estimate >90   Calcium 9.1   Anion Gap 6   Albumin 3.6   Protein Total 7.2   Bilirubin Total 0.3   Alkaline Phosphatase 98   ALT 16   AST 9   Cholesterol 224 (H)   Creatinine Urine 144   Patient Fasting > " 8hrs? Yes   HDL Cholesterol 55   Hemoglobin A1C 8.3 (H)   LDL Cholesterol Calculated 149 (H)   Non HDL Cholesterol 169 (H)   Protein Random Urine 0.37   Protein Total Urine g/gr Creatinine 0.26 (H)   Triglycerides 102   Glucose 171 (H)   WBC 6.6   Hemoglobin 14.1   Hematocrit 45.0   Platelet Count 202   RBC Count 4.98   MCV 90   MCH 28.3   MCHC 31.3 (L)   RDW 12.5   % Neutrophils 57   % Lymphocytes 30   % Monocytes 8   % Eosinophils 4   % Basophils 1   Absolute Basophils 0.0   Absolute Eosinophils 0.2   Absolute Immature Granulocytes 0.0   Absolute Lymphocytes 1.9   Absolute Monocytes 0.5   % Immature Granulocytes 0   Absolute Neutrophils 3.8   Absolute NRBCs 0.0   NRBCs per 100 WBC 0     Labs were independently reviewed and interpreted by me    1/11/2022  4:116       11/29/21  4:126     Imaging was personally reviewed and interpreted by me           Again, thank you for allowing me to participate in the care of your patient.      Sincerely,    Zarina Leung MD

## 2022-01-11 NOTE — NURSING NOTE
"Oncology Rooming Note    January 11, 2022 8:10 AM   Jenn Aparicio is a 66 year old female who presents for:    Chief Complaint   Patient presents with     Oncology Clinic Visit     P RETURN - LUNG CANCER     Initial Vitals: BP (!) 149/80   Pulse 94   Temp 98  F (36.7  C) (Oral)   Resp 16   Ht 1.676 m (5' 5.98\")   Wt 100.5 kg (221 lb 8 oz)   SpO2 99%   BMI 35.77 kg/m   Estimated body mass index is 35.77 kg/m  as calculated from the following:    Height as of this encounter: 1.676 m (5' 5.98\").    Weight as of this encounter: 100.5 kg (221 lb 8 oz). Body surface area is 2.16 meters squared.  No Pain (0) Comment: Data Unavailable   No LMP recorded. Patient is postmenopausal.  Allergies reviewed: Yes  Medications reviewed: Yes    Medications: Medication refills not needed today.  Pharmacy name entered into [facility]:    [facility] #[id number] - [address] AT [address] [place] [address] PHARMACY MAIL DELIVERY - [address]  [facility] #[id number] - [place], [place] - [address] [place] [address]    Clinical concerns: No new concerns. Madhu was notified.      Monty Grigsby, ROMAIN            "

## 2022-01-11 NOTE — PROGRESS NOTES
[facility] CANCER CLINIC  FOLLOW-UP VISIT NOTE    PATIENT NAME: Jenn Aparicio  MRN # [id number]   DATE OF VISIT: January 11, 2022  YOB: 1955     CANCER TYPE: Lung adenocarcinoma  STAGE: IA2 [id number] poorly diff adeno RLL, then [id number] IA2 suspected NSCLC RUL, medically inoperable     ECOG PS: 1    PD-L1: N/A  Lung panel: N/A  NGS: N/A    SUMMARY  12/24/15 PET/CT  1/13/15 CT lung bx. Adenocarcinoma  2/8/16 R thoracotomy, RLL lobectomy (Dr. Cunha). Adenocarcinoma, 1.1 cm, assoicated with atypical adenomatous hyperplasia  10/18/19 CTA for shortness of breath. New 1.3 cm RUL nodule  11/2019 PET/CT. 1.1 cm RUL hypermetabolic nodule (SUV 12.6)  12/26/19 Brain MRI negative for mets  1/14~1/28/20 SBRT to RUL nodule, 5000 cGy, every other day, 1000 cGy (Dr. Currie at [facility]). No bx due to O2 dependence and too much risk  8/19/20 First visit with me     ASSESSMENT AND PLAN   NSCLC, adeno, RUL: Now 2 years after SBRT. New RLL nodule (see below) on Nov 2021 CT. CT later this morning. I'll call her with results. We discussed the idea of bx if it's persistent or certainly if it's larger, vs PET to start if it's >1 cm    ADDENDUM: The RLL nodule looks stable on my review. Confirmed on final read.     ADDENDUM #2: New 10 mm RUL nodule. It's not possible to tell whether inflammatory or potentially malignant. Will get another CT chest in 2 1/2-3 months and go from there. Called Joe after read was back.     LLE swelling: More in the thigh. Looks fairly symmetric to me compared to the right. LLE US 12/8/21 negative for DVT. No significant edema on exam. Monitor for now.     DM2: Hasn't had a chance to follow up with PCP and hasn't had an A1c checked for 6 years. Will check that and urine protein/creat ratio, lipids, is fasting today. Has eye exam scheduled tomorrow. Appt with Dr. Nettles requested.    Peripheral neuropathy: Taking alpha-lipoic acid which has been helping but her insurance changed policies and now they're  charging her. Taking gabapentin 300 mg at bedtime, and she'd like to stop due to the perceived lack of efficacy. We discussed this is an incredibly low dose, but she doesn't find the neuropathy interferes with her life enough that she wants to titrate up. Will message SW to see if there's a way to get any assistance for the alpha lipoic acid and stop gabapentin.    COPD: Per Dr. Nettles. Can't make it to a pulm appt or sleep study as recommended due to social issues    Housing: Hoping to have a new place in [facility].     Review of the result(s) of each unique test - CMp, CBC pd  Independent interpretation of a test performed by another physician/other qualified health care professional (not separately reported) - CT chest    30 minutes spent on the date of the encounter doing chart review, history and exam, documentation and further activities per the note     Zarina Leung MD  Associate Professor of Medicine  Hematology, Oncology and Transplantation      SUBJECTIVE  Jenn returns today for follow up of h/o lung cancer now 2 years after SBRT to the RUL. Doing ok overall. Still has intermittent breathing problems, especially with cold weather. Nothing new though. Has continued being very careful to avoid covid exposures, which has been challenging. Uses nebs when more short of breath.     PAST MEDICAL HISTORY  Lung adenocarcinoma  DM2 with neuropathy  HCV IA, undetectable in 2019, treated  COPD. O2 dependent since late 2018. PFT 11/26/19. FEV1 0.64 (30%), FVC 1.69 (63%), DLCOunc 9.8 (38%)  HTN  H/o ITP  H/o disk herniation  Ankle surgery  Median neuropathy R  H/o sessile colon polyps 2014, h/o adenomas before that. Colonoscopy 2/7/18 @ [facility]. Sessile serated adenoma x 1, tubular adenoma x 3  H/o chronic LBP  Dyslipidemia  Cataract    CURRENT OUTPATIENT MEDICATIONS  Current Outpatient Medications   Medication Sig Dispense Refill     albuterol (PROAIR HFA/PROVENTIL HFA/VENTOLIN HFA) 108 (90 Base) MCG/ACT  inhaler INHALE 1 OR 2 PUFFS BY MOUTH EVERY 4 HOURS AS NEEDED 18 g 3     Alpha-Lipoic Acid 300 MG CAPS Take 300 mg by mouth daily 90 capsule 3     amLODIPine (NORVASC) 10 MG tablet Take 1 tablet (10 mg) by mouth daily 90 tablet 3     aspirin 81 MG EC tablet Take 1 tablet (81 mg) by mouth daily 90 tablet 3     cetirizine (ZYRTEC) 10 MG tablet Take 1 tablet (10 mg) by mouth daily 30 tablet 10     coenzyme Q-10 (CO-Q10) 50 MG capsule Take 1 capsule (50 mg) by mouth daily 90 capsule 3     fluticasone-vilanterol (BREO ELLIPTA) 200-25 MCG/INH inhaler Inhale 1 puff into the lungs daily 1 each 11     gabapentin (NEURONTIN) 300 MG capsule Take 1 capsule (300 mg) by mouth At Bedtime 90 capsule 1     glipiZIDE (GLUCOTROL XL) 2.5 MG 24 hr tablet Take 1 tablet (2.5 mg) by mouth daily 60 tablet 3     glucosamine-chondroitinoitin 500-400 MG tablet Take 1 tablet by mouth daily 90 tablet 3     ipratropium - albuterol 0.5 mg/2.5 mg/3 mL (DUONEB) 0.5-2.5 (3) MG/3ML neb solution Take 1 vial (3 mLs) by nebulization every 6 hours as needed for shortness of breath / dyspnea or wheezing 1080 mL 0     Lidocaine (SALONPAS PAIN RELIEVING) 4 % Patch Place onto the skin every 24 hours 7 patch 11     multivitamin w/minerals (THERA-VIT-M) tablet Take 1 tablet by mouth daily 30 tablet 0     nicotine (SM NICOTINE) 14 MG/24HR 24 hr patch Place 1 patch onto the skin every 24 hours 30 patch 6     Nutritional Supplements (ENSURE HIGH PROTEIN) Take 1 Can by mouth 3 times daily (with meals) 29240 mL 0     sodium chloride (NEBUSAL) 3 % neb solution Take 3 mLs by nebulization daily Use 1-2 times daily in combination with Duoneb and Aerobika to help with mucus clearance 250 mL 3     umeclidinium (INCRUSE ELLIPTA) 62.5 MCG/INH inhaler Inhale 1 puff into the lungs daily 1 each 3     vitamin D3 (CHOLECALCIFEROL) 50 mcg (2000 units) tablet Take by mouth daily Patient is unsure of dose but takes one tablet daily and things it is 50 mcg       benzonatate  "(TESSALON) 200 MG capsule Take 1 capsule (200 mg) by mouth 3 times daily as needed for cough (Patient not taking: Reported on 11/30/2021) 100 capsule 3     doxycycline hyclate (VIBRAMYCIN) 100 MG capsule Take 100 mg twice daily for five days if needed for treatment of a COPD exacerbation. Use alongside prednisone. (Patient not taking: Reported on 11/30/2021) 10 capsule 0     guaiFENesin (ROBITUSSIN) 20 mg/mL SOLN solution Take 10 mLs by mouth every 4 hours as needed for cough (Patient not taking: Reported on 11/30/2021) 118 mL 0     predniSONE (DELTASONE) 20 MG tablet Take 40 mg daily for 5 days if needed for treatment of a COPD exacerbation, use alongside doxycycline (Patient not taking: Reported on 7/2/2021) 10 tablet 0     ALLERGIES  Allergies   Allergen Reactions     Interferons Dermatitis     Penicillins Hives     Aspirin      325mg      Codeine      Colon Care       REVIEW OF SYSTEMS  As above in the HPI, o/w complete 12-point ROS was negative.    PHYSICAL EXAM  BP (!) 149/80   Pulse 94   Temp 98  F (36.7  C) (Oral)   Resp 16   Ht 1.676 m (5' 5.98\")   Wt 100.5 kg (221 lb 8 oz)   SpO2 99%   BMI 35.77 kg/m      GEN: NAD  HEENT: EOMI, no icterus, injection or pallor  LUNGS: clear bilaterally albeit decreased  CV: regular, no murmurs, rubs, or gallops  EXT: warm, no edema, symmetric   NEURO: alert  SKIN: no rashes    LABORATORY AND IMAGING STUDIES   1/11/2022 08:45   Sodium 142   Potassium 4.2   Chloride 106   Carbon Dioxide 30   Urea Nitrogen 9   Creatinine 0.53   GFR Estimate >90   Calcium 9.1   Anion Gap 6   Albumin 3.6   Protein Total 7.2   Bilirubin Total 0.3   Alkaline Phosphatase 98   ALT 16   AST 9   Cholesterol 224 (H)   Creatinine Urine 144   Patient Fasting > 8hrs? Yes   HDL Cholesterol 55   Hemoglobin A1C 8.3 (H)   LDL Cholesterol Calculated 149 (H)   Non HDL Cholesterol 169 (H)   Protein Random Urine 0.37   Protein Total Urine g/gr Creatinine 0.26 (H)   Triglycerides 102   Glucose 171 (H)   WBC " 6.6   Hemoglobin 14.1   Hematocrit 45.0   Platelet Count 202   RBC Count 4.98   MCV 90   MCH 28.3   MCHC 31.3 (L)   RDW 12.5   % Neutrophils 57   % Lymphocytes 30   % Monocytes 8   % Eosinophils 4   % Basophils 1   Absolute Basophils 0.0   Absolute Eosinophils 0.2   Absolute Immature Granulocytes 0.0   Absolute Lymphocytes 1.9   Absolute Monocytes 0.5   % Immature Granulocytes 0   Absolute Neutrophils 3.8   Absolute NRBCs 0.0   NRBCs per 100 WBC 0     Labs were independently reviewed and interpreted by me    1/11/2022  4:116       11/29/21  4:126     Imaging was personally reviewed and interpreted by me

## 2022-01-11 NOTE — TELEPHONE ENCOUNTER
nicotine (SM NICOTINE) 14 MG/24HR 24 hr patch 30 patch 6 12/8/2021  No   Sig - Route: Place 1 patch onto the skin every 24 hours - Transdermal     Last Office Visit :  7/2/2021 [facility]. Being treated for lung adenocarcinoma by Dr Leung.

## 2022-01-12 ENCOUNTER — OFFICE VISIT (OUTPATIENT)
Dept: OPTOMETRY | Facility: CLINIC | Age: 67
End: 2022-01-12
Payer: COMMERCIAL

## 2022-01-12 DIAGNOSIS — H10.13 ALLERGIC CONJUNCTIVITIS OF BOTH EYES: ICD-10-CM

## 2022-01-12 DIAGNOSIS — H52.03 HYPEROPIA, BILATERAL: ICD-10-CM

## 2022-01-12 DIAGNOSIS — H52.223 REGULAR ASTIGMATISM OF BOTH EYES: ICD-10-CM

## 2022-01-12 DIAGNOSIS — H04.123 DRY EYE SYNDROME OF BOTH EYES: ICD-10-CM

## 2022-01-12 DIAGNOSIS — E11.9 TYPE 2 DIABETES MELLITUS WITHOUT COMPLICATION, WITHOUT LONG-TERM CURRENT USE OF INSULIN (H): Primary | ICD-10-CM

## 2022-01-12 DIAGNOSIS — H25.813 COMBINED FORMS OF AGE-RELATED CATARACT OF BOTH EYES: ICD-10-CM

## 2022-01-12 DIAGNOSIS — H52.4 PRESBYOPIA: ICD-10-CM

## 2022-01-12 PROCEDURE — 92015 DETERMINE REFRACTIVE STATE: CPT | Performed by: OPTOMETRIST

## 2022-01-12 PROCEDURE — 92004 COMPRE OPH EXAM NEW PT 1/>: CPT | Performed by: OPTOMETRIST

## 2022-01-12 RX ORDER — POLYETHYLENE GLYCOL 400 AND PROPYLENE GLYCOL 4; 3 MG/ML; MG/ML
1 SOLUTION/ DROPS OPHTHALMIC 4 TIMES DAILY
Qty: 5 ML | Refills: 11 | Status: SHIPPED | OUTPATIENT
Start: 2022-01-12 | End: 2022-04-20

## 2022-01-12 RX ORDER — OLOPATADINE HYDROCHLORIDE 2 MG/ML
1 SOLUTION/ DROPS OPHTHALMIC DAILY
Qty: 2.5 ML | Refills: 11 | Status: SHIPPED | OUTPATIENT
Start: 2022-01-12 | End: 2022-04-20

## 2022-01-12 ASSESSMENT — VISUAL ACUITY
OD_SC: 20/200
CORRECTION_TYPE: GLASSES
OS_SC: 20/70
OD_CC: 20/40
OD_SC: 20/50
OS_CC: 20/150
OD_CC: 20/100
OS_SC: 20/200
METHOD: SNELLEN - LINEAR
OS_CC: 20/40

## 2022-01-12 ASSESSMENT — EXTERNAL EXAM - LEFT EYE: OS_EXAM: NORMAL

## 2022-01-12 ASSESSMENT — KERATOMETRY
OS_AXISANGLE2_DEGREES: 039
OD_AXISANGLE_DEGREES: 027
OD_K2POWER_DIOPTERS: 43.25
OS_K2POWER_DIOPTERS: 43.50
OS_AXISANGLE_DEGREES: 129
OD_K1POWER_DIOPTERS: 42.75
OS_K1POWER_DIOPTERS: 42.25
METHOD_AUTO_MANUAL: AUTOMATED
OD_AXISANGLE2_DEGREES: 117

## 2022-01-12 ASSESSMENT — REFRACTION_MANIFEST
OD_ADD: +2.50
OS_SPHERE: -0.25
OS_ADD: +2.50
OD_SPHERE: +0.25
OS_CYLINDER: +1.25
OS_AXIS: 153
OD_AXIS: 179
OD_CYLINDER: +0.75

## 2022-01-12 ASSESSMENT — REFRACTION_WEARINGRX
OD_CYLINDER: +1.75
OS_SPHERE: -3.25
OS_CYLINDER: +1.25
OS_AXIS: 153
OD_SPHERE: +2.25
OD_ADD: +2.75
OD_AXIS: 182
OS_ADD: +2.75

## 2022-01-12 ASSESSMENT — TONOMETRY
OS_IOP_MMHG: 18
OD_IOP_MMHG: 19
IOP_METHOD: TONOPEN

## 2022-01-12 ASSESSMENT — SLIT LAMP EXAM - LIDS
COMMENTS: NORMAL
COMMENTS: NORMAL

## 2022-01-12 ASSESSMENT — CUP TO DISC RATIO
OD_RATIO: 0.45
OS_RATIO: 0.4

## 2022-01-12 ASSESSMENT — EXTERNAL EXAM - RIGHT EYE: OD_EXAM: NORMAL

## 2022-01-12 ASSESSMENT — CONF VISUAL FIELD
OS_NORMAL: 1
OD_NORMAL: 1

## 2022-01-12 NOTE — PROGRESS NOTES
Chief Complaint   Patient presents with     Annual Eye Exam     Diabetic Eye Exam        Chief Complaint(s) and History of Present Illness(es)     Annual Eye Exam     Laterality: both eyes              Diabetic Eye Exam     Vision: is stable    Diabetes Type: Type 2    Duration: 10 years    Blood Sugars: is controlled               Hemoglobin A1C POCT   Date Value Ref Range Status   02/24/2021 7.0 (H) 0 - 5.6 % Final     Comment:     Normal <5.7% Prediabetes 5.7-6.4%  Diabetes 6.5% or higher - adopted from ADA   consensus guidelines.     09/08/2020 6.8 (H) 0 - 5.6 % Final     Comment:     Normal <5.7% Prediabetes 5.7-6.4%  Diabetes 6.5% or higher - adopted from ADA   consensus guidelines.       Hemoglobin A1C   Date Value Ref Range Status   01/11/2022 8.3 (H) 0.0 - 5.6 % Final     Comment:     Normal <5.7%   Prediabetes 5.7-6.4%    Diabetes 6.5% or higher     Note: Adopted from ADA consensus guidelines.   12/01/2015 7.1 (H) 4.2 - 6.1 % Final            Last Eye Exam: 2019  Dilated Previously: Yes, OK to dilate    What are you currently using to see?  glasses    Distance Vision Acuity: Noticed gradual change in both eyes    Near Vision Acuity: Satisfied with vision while reading  with glasses    Eye Comfort: dry  Do you use eye drops? : Yes: OTC dry eye-Visine-itchy and watery with allergies  Occupation or Hobbies: Retired    [facility]     Medical, surgical and family histories reviewed and updated 1/12/2022.       OBJECTIVE: See Ophthalmology exam    ASSESSMENT:    ICD-10-CM    1. Type 2 diabetes mellitus without complication, without long-term current use of insulin (H)  E11.9 EYE EXAM (SIMPLE-NONBILLABLE)    Negative diabetic retinopathy   2. Combined forms of age-related cataract of both eyes  H25.813 EYE EXAM (SIMPLE-NONBILLABLE)   3. Allergic conjunctivitis of both eyes  H10.13 olopatadine (PATADAY) 0.2 % ophthalmic solution     EYE EXAM (SIMPLE-NONBILLABLE)   4. Dry eye syndrome of both eyes  H04.123  polyethylene glycol-propylene glycol (SYSTANE) 0.4-0.3 % SOLN ophthalmic solution     EYE EXAM (SIMPLE-NONBILLABLE)   5. Presbyopia  H52.4 REFRACTION   6. Hyperopia, bilateral  H52.03 REFRACTION   7. Regular astigmatism of both eyes  H52.223 REFRACTION      PLAN:    Jenn do  eye exam results will be sent to Mitzi Nettles  Patient Instructions   There are not any signs of the diabetes affecting the eyes today.  It is important that you get your eyes dilated once yearly and keep good control of your diabetes.    You have cataracts which are affecting your vision.  A change in glasses will not give you much improvement.  A cataract evaluation can be scheduled with the eye surgeon to discuss with you the option of cataract surgery. (patient declines- will try glasses first)     Pataday to be used once daily for itchy eyes.  Use as needed. Contact your pharmacy for refills.    Systane Ultra 1 drop both eyes 2-4 x day.    Eyeglass prescription given.    Return in 1 year for a complete eye exam or sooner if needed.    Joe Landers, OD

## 2022-01-12 NOTE — PATIENT INSTRUCTIONS
There are not any signs of the diabetes affecting the eyes today.  It is important that you get your eyes dilated once yearly and keep good control of your diabetes.    You have cataracts which are affecting your vision.  A change in glasses will not give you much improvement.  A cataract evaluation can be scheduled with the eye surgeon to discuss with you the option of cataract surgery. (patient declines- will try glasses first)     Pataday to be used once daily for itchy eyes.  Use as needed. Contact your pharmacy for refills.    Systane Ultra 1 drop both eyes 2-4 x day.    Eyeglass prescription given.    Return in 1 year for a complete eye exam or sooner if needed.    Joe Landers, OD    The affects of the dilating drops last for 4- 6 hours.  You will be more sensitive to light and vision will be blurry up close.  Do not drive if you do not feel comfortable.  Mydriatic sunglasses were given if needed.    Patient Education   Diabetes weakens the blood vessels all over the body, including the eyes. Damage to the blood vessels in the eyes can cause swelling or bleeding into part of the eye (called the retina). This is called diabetic retinopathy (AGUILA-tin--pu-thee). If not treated, this disease can cause vision loss or blindness.   Symptoms may include blurred or distorted vision, but many people have no symptoms. It's important to see your eye doctor regularly to check for problems.   Early treatment and good control can help protect your vision. Here are the things you can do to help prevent vision loss:      1. Keep your blood sugar levels under tight control.      2. Bring high blood pressure under control.      3. No smoking.      4. Have yearly dilated eye exams.       Optometry Providers       Clinic Locations                                 Telephone Number   Dr. Courtney Stephenson  [facility] [place] [phone]     [facility] Hours:                [facility] Hours:       [facility] Hours:   [address]   [address]     [address] [facility]  [place] [address]   [place] [address]    [place] [address]  Phone: [phone]                    Phone: [phone]     Phone: [phone]                      Monday 8:00-6:00                          Monday 8:00-6:00                          Monday 8:00-6:00              Tuesday 8:00-6:00                          Tuesday 8:00-6:00                          Tuesday 8:00-6:00              Wednesday 8:00-6:00                  Wednesday 8:00-6:00                   Wednesday 8:00-6:00      Thursday 8:00-6:00                        Thursday 8:00-6:00                         Thursday 8:00-6:00            Friday 8:00-5:00                              Friday 8:00-5:00                              Friday 8:00-5:00    [facility] Hours:   [address] [place] [address]  [phone]    Monday 9:00-6:00  Tuesday 9:00-6:00  Wednesday 9:00-6:00  Thursday 9:00-6:00  Friday 9:00-5:00  Please log on to [email or web address] to order your contact lenses.  The link is found on the [facility] page.  As always, Thank you for trusting us with your health care needs!

## 2022-01-14 ENCOUNTER — TELEPHONE (OUTPATIENT)
Dept: INTERNAL MEDICINE | Facility: CLINIC | Age: 67
End: 2022-01-14
Payer: COMMERCIAL

## 2022-01-14 DIAGNOSIS — Z12.31 ENCOUNTER FOR SCREENING MAMMOGRAM FOR BREAST CANCER: Primary | ICD-10-CM

## 2022-01-18 NOTE — TELEPHONE ENCOUNTER
Pt is scheduled with PCP on 1/27/22, so we scheduled her mammogram on the same day. Pt was informed.

## 2022-01-27 ENCOUNTER — ANCILLARY PROCEDURE (OUTPATIENT)
Dept: MAMMOGRAPHY | Facility: CLINIC | Age: 67
End: 2022-01-27
Attending: NURSE PRACTITIONER
Payer: COMMERCIAL

## 2022-01-27 ENCOUNTER — OFFICE VISIT (OUTPATIENT)
Dept: INTERNAL MEDICINE | Facility: CLINIC | Age: 67
End: 2022-01-27
Payer: COMMERCIAL

## 2022-01-27 ENCOUNTER — TELEPHONE (OUTPATIENT)
Dept: INTERNAL MEDICINE | Facility: CLINIC | Age: 67
End: 2022-01-27

## 2022-01-27 ENCOUNTER — LAB (OUTPATIENT)
Dept: LAB | Facility: CLINIC | Age: 67
End: 2022-01-27

## 2022-01-27 VITALS
OXYGEN SATURATION: 95 % | SYSTOLIC BLOOD PRESSURE: 138 MMHG | BODY MASS INDEX: 35.31 KG/M2 | DIASTOLIC BLOOD PRESSURE: 84 MMHG | HEART RATE: 89 BPM | WEIGHT: 219.7 LBS | RESPIRATION RATE: 16 BRPM | HEIGHT: 66 IN

## 2022-01-27 DIAGNOSIS — E11.22 TYPE 2 DIABETES MELLITUS WITH STAGE 3 CHRONIC KIDNEY DISEASE, WITHOUT LONG-TERM CURRENT USE OF INSULIN, UNSPECIFIED WHETHER STAGE 3A OR 3B CKD (H): ICD-10-CM

## 2022-01-27 DIAGNOSIS — J42 CHRONIC BRONCHITIS, UNSPECIFIED CHRONIC BRONCHITIS TYPE (H): ICD-10-CM

## 2022-01-27 DIAGNOSIS — R30.0 DYSURIA: Primary | ICD-10-CM

## 2022-01-27 DIAGNOSIS — C34.91 ADENOCARCINOMA OF RIGHT LUNG (H): ICD-10-CM

## 2022-01-27 DIAGNOSIS — R35.0 URINARY FREQUENCY: ICD-10-CM

## 2022-01-27 DIAGNOSIS — E78.00 ELEVATED LDL CHOLESTEROL LEVEL: ICD-10-CM

## 2022-01-27 DIAGNOSIS — Z12.31 ENCOUNTER FOR SCREENING MAMMOGRAM FOR BREAST CANCER: ICD-10-CM

## 2022-01-27 DIAGNOSIS — N89.8 VAGINAL DISCHARGE: ICD-10-CM

## 2022-01-27 DIAGNOSIS — E11.9 TYPE 2 DIABETES MELLITUS WITHOUT COMPLICATION, WITHOUT LONG-TERM CURRENT USE OF INSULIN (H): Primary | ICD-10-CM

## 2022-01-27 DIAGNOSIS — N18.30 TYPE 2 DIABETES MELLITUS WITH STAGE 3 CHRONIC KIDNEY DISEASE, WITHOUT LONG-TERM CURRENT USE OF INSULIN, UNSPECIFIED WHETHER STAGE 3A OR 3B CKD (H): ICD-10-CM

## 2022-01-27 LAB
ALBUMIN SERPL-MCNC: 3.9 G/DL (ref 3.4–5)
ALBUMIN UR-MCNC: 100 MG/DL
ALP SERPL-CCNC: 105 U/L (ref 40–150)
ALT SERPL W P-5'-P-CCNC: 16 U/L (ref 0–50)
ANION GAP SERPL CALCULATED.3IONS-SCNC: 6 MMOL/L (ref 3–14)
APPEARANCE UR: ABNORMAL
AST SERPL W P-5'-P-CCNC: 10 U/L (ref 0–45)
BACTERIA #/AREA URNS HPF: ABNORMAL /HPF
BASOPHILS # BLD AUTO: 0 10E3/UL (ref 0–0.2)
BASOPHILS NFR BLD AUTO: 1 %
BILIRUB SERPL-MCNC: 0.4 MG/DL (ref 0.2–1.3)
BILIRUB UR QL STRIP: NEGATIVE
BUN SERPL-MCNC: 8 MG/DL (ref 7–30)
CALCIUM SERPL-MCNC: 9.2 MG/DL (ref 8.5–10.1)
CHLORIDE BLD-SCNC: 104 MMOL/L (ref 94–109)
CHOLEST SERPL-MCNC: 251 MG/DL
CLUE CELLS: NORMAL
CO2 SERPL-SCNC: 32 MMOL/L (ref 20–32)
COLOR UR AUTO: YELLOW
CREAT SERPL-MCNC: 0.49 MG/DL (ref 0.52–1.04)
EOSINOPHIL # BLD AUTO: 0.2 10E3/UL (ref 0–0.7)
EOSINOPHIL NFR BLD AUTO: 3 %
ERYTHROCYTE [DISTWIDTH] IN BLOOD BY AUTOMATED COUNT: 12.7 % (ref 10–15)
FASTING STATUS PATIENT QL REPORTED: YES
GFR SERPL CREATININE-BSD FRML MDRD: >90 ML/MIN/1.73M2
GLUCOSE BLD-MCNC: 110 MG/DL (ref 70–99)
GLUCOSE UR STRIP-MCNC: NEGATIVE MG/DL
HBA1C MFR BLD: 8.4 % (ref 0–5.6)
HCT VFR BLD AUTO: 45 % (ref 35–47)
HDLC SERPL-MCNC: 58 MG/DL
HGB BLD-MCNC: 14 G/DL (ref 11.7–15.7)
HGB UR QL STRIP: NEGATIVE
IMM GRANULOCYTES # BLD: 0 10E3/UL
IMM GRANULOCYTES NFR BLD: 0 %
KETONES UR STRIP-MCNC: NEGATIVE MG/DL
LDLC SERPL CALC-MCNC: 168 MG/DL
LEUKOCYTE ESTERASE UR QL STRIP: ABNORMAL
LYMPHOCYTES # BLD AUTO: 2.3 10E3/UL (ref 0.8–5.3)
LYMPHOCYTES NFR BLD AUTO: 32 %
MCH RBC QN AUTO: 27.3 PG (ref 26.5–33)
MCHC RBC AUTO-ENTMCNC: 31.1 G/DL (ref 31.5–36.5)
MCV RBC AUTO: 88 FL (ref 78–100)
MONOCYTES # BLD AUTO: 0.6 10E3/UL (ref 0–1.3)
MONOCYTES NFR BLD AUTO: 8 %
MUCOUS THREADS #/AREA URNS LPF: PRESENT /LPF
NEUTROPHILS # BLD AUTO: 4 10E3/UL (ref 1.6–8.3)
NEUTROPHILS NFR BLD AUTO: 56 %
NITRATE UR QL: NEGATIVE
NONHDLC SERPL-MCNC: 193 MG/DL
NRBC # BLD AUTO: 0 10E3/UL
NRBC BLD AUTO-RTO: 0 /100
PH UR STRIP: 7 [PH] (ref 5–7)
PLATELET # BLD AUTO: 218 10E3/UL (ref 150–450)
POTASSIUM BLD-SCNC: 3.7 MMOL/L (ref 3.4–5.3)
PROT SERPL-MCNC: 7.6 G/DL (ref 6.8–8.8)
RBC # BLD AUTO: 5.12 10E6/UL (ref 3.8–5.2)
RBC URINE: 4 /HPF
SODIUM SERPL-SCNC: 142 MMOL/L (ref 133–144)
SP GR UR STRIP: 1.02 (ref 1–1.03)
SQUAMOUS EPITHELIAL: 27 /HPF
TRICHOMONAS, WET PREP: NORMAL
TRIGL SERPL-MCNC: 127 MG/DL
UROBILINOGEN UR STRIP-MCNC: 4 MG/DL
WBC # BLD AUTO: 7.2 10E3/UL (ref 4–11)
WBC URINE: 5 /HPF
WBC'S/HIGH POWER FIELD, WET PREP: NORMAL
YEAST, WET PREP: NORMAL

## 2022-01-27 PROCEDURE — 85025 COMPLETE CBC W/AUTO DIFF WBC: CPT | Performed by: PATHOLOGY

## 2022-01-27 PROCEDURE — 87210 SMEAR WET MOUNT SALINE/INK: CPT | Performed by: PATHOLOGY

## 2022-01-27 PROCEDURE — G0009 ADMIN PNEUMOCOCCAL VACCINE: HCPCS | Performed by: NURSE PRACTITIONER

## 2022-01-27 PROCEDURE — 81001 URINALYSIS AUTO W/SCOPE: CPT | Performed by: PATHOLOGY

## 2022-01-27 PROCEDURE — 77067 SCR MAMMO BI INCL CAD: CPT | Mod: GC | Performed by: RADIOLOGY

## 2022-01-27 PROCEDURE — 99214 OFFICE O/P EST MOD 30 MIN: CPT | Mod: 25 | Performed by: NURSE PRACTITIONER

## 2022-01-27 PROCEDURE — 80061 LIPID PANEL: CPT | Performed by: PATHOLOGY

## 2022-01-27 PROCEDURE — 90732 PPSV23 VACC 2 YRS+ SUBQ/IM: CPT | Performed by: NURSE PRACTITIONER

## 2022-01-27 PROCEDURE — 36415 COLL VENOUS BLD VENIPUNCTURE: CPT | Performed by: PATHOLOGY

## 2022-01-27 PROCEDURE — 77063 BREAST TOMOSYNTHESIS BI: CPT | Mod: GC | Performed by: RADIOLOGY

## 2022-01-27 PROCEDURE — 80053 COMPREHEN METABOLIC PANEL: CPT | Performed by: PATHOLOGY

## 2022-01-27 PROCEDURE — 83036 HEMOGLOBIN GLYCOSYLATED A1C: CPT | Performed by: PATHOLOGY

## 2022-01-27 RX ORDER — GLIPIZIDE 5 MG/1
5 TABLET ORAL
Qty: 90 TABLET | Refills: 3 | Status: SHIPPED | OUTPATIENT
Start: 2022-01-27 | End: 2022-04-20

## 2022-01-27 RX ORDER — NITROFURANTOIN MACROCRYSTAL 100 MG
100 CAPSULE ORAL 4 TIMES DAILY
Qty: 28 CAPSULE | Refills: 0 | Status: SHIPPED | OUTPATIENT
Start: 2022-01-27 | End: 2022-02-22

## 2022-01-27 RX ORDER — FLASH GLUCOSE SENSOR
KIT MISCELLANEOUS
Qty: 2 EACH | Refills: 11 | Status: SHIPPED | OUTPATIENT
Start: 2022-01-27 | End: 2022-03-24

## 2022-01-27 ASSESSMENT — PATIENT HEALTH QUESTIONNAIRE - PHQ9: SUM OF ALL RESPONSES TO PHQ QUESTIONS 1-9: 6

## 2022-01-27 ASSESSMENT — MIFFLIN-ST. JEOR: SCORE: 1553.3

## 2022-01-27 NOTE — NURSING NOTE
"Jenn Aparicio is a 66 year old female patient that presents today in clinic for the following:    Chief Complaint   Patient presents with     RECHECK     Follow-up per Dr. Madhu Ludwig Medication     The patient's allergies and medications were reviewed as noted. A set of vitals were recorded as noted without incident: /84 (BP Location: Right arm, Patient Position: Sitting, Cuff Size: Adult Large)   Pulse 89   Resp 16   Ht 1.676 m (5' 6\")   Wt 99.7 kg (219 lb 11.2 oz)   SpO2 95%   Breastfeeding No   BMI 35.46 kg/m  . The patient was asked if they had any of the following symptoms in the last forty-eight hours: (1) fever or chills, (2) cough, (3) shortness of breath or difficulty breathing, (4) fatigue, (5) muscle or body aches, (6) headache, (7) new loss of taste or smell, (8) sore throat, (9) congestion or runny nose, (10) nausea or vomiting, and (11) diarrhea. Jenn Aparicio reports a cough today in clinic. The patient does not have any other questions for the provider.    Kael Pineda, EMT at 1:04 PM on 1/27/2022  "

## 2022-01-27 NOTE — PROGRESS NOTES
Jenn Aparicio received the [id number] today in clinic at the request of Mitzi Nettles. The immunization was given under the supervision of Mitzi Nettles if assistance was needed. The immunization site was cleaned with an alcohol prep wipe. The immunization was given without incident--see immunization list for administration details. No swelling or redness was observed at the site of injection after the immunization was given. The patient was advised to remain in [facility] lobby for 15 minutes after the injection in case of an averse reaction.     Kael Pineda, EMT at 2:27 PM on 1/27/2022

## 2022-01-28 ENCOUNTER — APPOINTMENT (OUTPATIENT)
Dept: OPTOMETRY | Facility: CLINIC | Age: 67
End: 2022-01-28
Payer: COMMERCIAL

## 2022-01-28 PROCEDURE — 92341 FIT SPECTACLES BIFOCAL: CPT | Performed by: OPTOMETRIST

## 2022-01-28 NOTE — PROGRESS NOTES
S:  Jenn Aparicio is a 66 year old female with the following problems listed below:       Patient Active Problem List   Diagnosis     Vaginitis     Postmenopausal bleeding     Cervical stenosis (uterine cervix)     Pulmonary emphysema (H)     Type 2 diabetes mellitus without complication, without long-term current use of insulin (H)     Primary lung adenocarcinoma (H) STAGE: IA2 [id number] poorly diff adeno RLL, then [id number] IA2 suspected NSCLC RUL, medically inoperable      Chronic obstructive pulmonary disease, unspecified COPD type (H)     Malnutrition (H)     Diabetic neuropathy (H)     HPI:  Jenn has been experiencing some urinary incontinence whe she rises from a laying position to standing.  SHe also has urgency and nocturia.    She has portable oxygen but does not need it when she is resting (not using it during this visit).     She would guadalupe to be scheduled for a pulmonary medicine visit.  She has COPD and is using her inhalers.    Her recent cholesterol numbers were elevated.  SHe has been eating eggs and shellfish and did not realize they are high in cholesterol.    She had a mammogram done today.     Her sister gave her a Freestyle glucose meter and 2 disks.  Will order more disks but not sure her insurance will cover as she is not on insulin.       Past Medical History:   Diagnosis Date     Adenocarcinoma, lung (H)      Asthma      Ectopic pregnancy      Pulmonary emphysema (H)     Very severe FEV1<30% predicted     Type II diabetes mellitus (H)        Past Surgical History:   Procedure Laterality Date     2/8/16                R thoracotomy, RLL lobectomy (Dr. Cunha). Adenocarcinoma, 1.1 cm, assoicated with atypical adenomatous hyperplasia Right 02/08/2016    R thoracotomy, RLL lobectomy (Dr. Cunha). Adenocarcinoma, 1.1 cm, assoicated with atypical adenomatous hyperplasia     Immunization History   Administered Date(s) Administered     COVID-19,PF,Moderna 03/12/2021, 04/09/2021, 12/16/2021     Flu,  Unspecified 11/07/2011, 10/02/2012, 10/29/2013     HepA-Adult 12/18/2007     HepB-Adult 06/18/2002, 07/19/2002     Influenza Vaccine IM > 6 months Valent IIV4 (Alfuria,Fluzone) 09/26/2014, 11/05/2015, 09/23/2016, 10/20/2019     Influenza, Quad, High Dose, Pf, 65yr+ (Fluzone HD) 10/01/2020     Pneumo Conj 13-V (2010&after) 10/24/2019     Pneumococcal 23 valent 10/29/2013, 01/27/2022     Tdap (Adacel,Boostrix) 02/12/2015            Social History     Tobacco Use     Smoking status: Current Every Day Smoker     Packs/day: 0.25     Types: Cigarettes     Smokeless tobacco: Former User     Tobacco comment: 2/24/21 Patient has quit as of 2/12/21. Patient will be considered former smoker if she remains smoke-free for 1 month   Substance Use Topics     Alcohol use: Yes     Comment: sometime            Family History   Problem Relation Age of Onset     Depression Daughter      Alcohol/Drug Other         self     Diabetes Other         self     Thyroid Disease Other         self     Asthma Other         self               Allergies   Allergen Reactions     Interferons Dermatitis     Penicillins Hives     Aspirin      325mg      Codeine      Colon Care             Current Outpatient Medications   Medication Sig Dispense Refill     albuterol (PROAIR HFA/PROVENTIL HFA/VENTOLIN HFA) 108 (90 Base) MCG/ACT inhaler INHALE 1 OR 2 PUFFS BY MOUTH EVERY 4 HOURS AS NEEDED 18 g 3     Alpha-Lipoic Acid 300 MG CAPS Take 300 mg by mouth daily 90 capsule 3     amLODIPine (NORVASC) 10 MG tablet Take 1 tablet (10 mg) by mouth daily 90 tablet 3     aspirin 81 MG EC tablet Take 1 tablet (81 mg) by mouth daily 90 tablet 3     benzonatate (TESSALON) 200 MG capsule Take 1 capsule (200 mg) by mouth 3 times daily as needed for cough 100 capsule 3     cetirizine (ZYRTEC) 10 MG tablet Take 1 tablet (10 mg) by mouth daily 30 tablet 10     coenzyme Q-10 (CO-Q10) 50 MG capsule Take 1 capsule (50 mg) by mouth daily 90 capsule 3     Continuous Blood Gluc  Sensor (FREESTYLE GIOVANNI 14 DAY SENSOR) MISC Change every 14 days. 2 each 11     doxycycline hyclate (VIBRAMYCIN) 100 MG capsule Take 100 mg twice daily for five days if needed for treatment of a COPD exacerbation. Use alongside prednisone. 10 capsule 0     fluticasone-vilanterol (BREO ELLIPTA) 200-25 MCG/INH inhaler Inhale 1 puff into the lungs daily 1 each 11     glipiZIDE (GLUCOTROL) 5 MG tablet Take 1 tablet (5 mg) by mouth 2 times daily (before meals) 90 tablet 3     glucosamine-chondroitinoitin 500-400 MG tablet Take 1 tablet by mouth daily 90 tablet 3     guaiFENesin (ROBITUSSIN) 20 mg/mL SOLN solution Take 10 mLs by mouth every 4 hours as needed for cough 118 mL 0     ipratropium - albuterol 0.5 mg/2.5 mg/3 mL (DUONEB) 0.5-2.5 (3) MG/3ML neb solution Take 1 vial (3 mLs) by nebulization every 6 hours as needed for shortness of breath / dyspnea or wheezing 1080 mL 0     Lidocaine (SALONPAS PAIN RELIEVING) 4 % Patch Place onto the skin every 24 hours 7 patch 11     multivitamin w/minerals (THERA-VIT-M) tablet Take 1 tablet by mouth daily 30 tablet 0     nicotine (SM NICOTINE) 14 MG/24HR 24 hr patch Place 1 patch onto the skin every 24 hours 30 patch 6     Nutritional Supplements (ENSURE HIGH PROTEIN) Take 1 Can by mouth 3 times daily (with meals) 89850 mL 0     olopatadine (PATADAY) 0.2 % ophthalmic solution Place 1 drop into both eyes daily 2.5 mL 11     polyethylene glycol-propylene glycol (SYSTANE) 0.4-0.3 % SOLN ophthalmic solution Place 1 drop into both eyes 4 times daily 5 mL 11     predniSONE (DELTASONE) 20 MG tablet Take 40 mg daily for 5 days if needed for treatment of a COPD exacerbation, use alongside doxycycline 10 tablet 0     sodium chloride (NEBUSAL) 3 % neb solution Take 3 mLs by nebulization daily Use 1-2 times daily in combination with Duoneb and Aerobika to help with mucus clearance 250 mL 3     umeclidinium (INCRUSE ELLIPTA) 62.5 MCG/INH inhaler Inhale 1 puff into the lungs daily 1 each 3      "vitamin D3 (CHOLECALCIFEROL) 50 mcg (2000 units) tablet Take by mouth daily Patient is unsure of dose but takes one tablet daily and things it is 50 mcg       gabapentin (NEURONTIN) 300 MG capsule Take 1 capsule (300 mg) by mouth At Bedtime (Patient not taking: Reported on 1/27/2022) 90 capsule 1     nitroFURantoin macrocrystal (MACRODANTIN) 100 MG capsule Take 1 capsule (100 mg) by mouth 4 times daily 28 capsule 0         REVIEW OF SYSTEMS:  See above.      O:   /84 (BP Location: Right arm, Patient Position: Sitting, Cuff Size: Adult Large)   Pulse 89   Resp 16   Ht 1.676 m (5' 6\")   Wt 99.7 kg (219 lb 11.2 oz)   SpO2 95%   Breastfeeding No   BMI 35.46 kg/m    GENERAL APPEARANCE: healthy, alert and no distress  EYES: Sclera white, conjunctiva pink.  HENT: ear canals and TM's normal aand mouth without ulcers or lesions  RESP: lungs clear to auscultation - no rales, rhonchi or wheezes  CV: regular rates and rhythm, normal S1 S2, no S3 or S4 and no murmur, click or rub   ABDOMEN:  soft, nontender, no HSM or masses and bowel sounds normal.  BREASTS: no masses.   NEURO: alert and oriented, ambulatory  MUSK: Grossly normal  SKIN: Grossly normal  LYMPH: neg axillary, cervical, supra and infraclavicular nodes.  EXT: warm.  Edema NO   PSYCHE: pleasant, good historian.    A/P:  Jenn was seen today for recheck and recheck medication.    Diagnoses and all orders for this visit:    Type 2 diabetes mellitus without complication, without long-term current use of insulin (H)  -     Continuous Blood Gluc Sensor (FREESTYLE GIOVANNI 14 DAY SENSOR) MISC; Change every 14 days.    Type 2 diabetes mellitus with stage 3 chronic kidney disease, without long-term current use of insulin, unspecified whether stage 3a or 3b CKD (H)  -     glipiZIDE (GLUCOTROL) 5 MG tablet; Take 1 tablet (5 mg) by mouth 2 times daily (before meals).  This is an increase from 1 tab daily.    -     Hemoglobin A1c; Future, 4 months    Urinary " frequency  -     UA with Micro reflex to Culture; Future    Vaginal discharge  -     Wet prep    Elevated LDL cholesterol level  -     Lipid panel reflex to direct LDL Fasting; Future 4 months.    Chronic bronchitis, unspecified chronic bronchitis type (H)  -     Adult Pulmonary Medicine Referral    Other orders  -     Pneumococcal vaccine 23 valent PPSV23  (Pneumovax) [[id number]]    RTC 4 months and discuss colonoscopy vs FIT screening then.       The patient voiced understanding of the information discussed and all questions were answered.   Total time spent today with this patient including chart review, exam time with patient and documentation : 50 minutes      Mitzi LABOY, CNP

## 2022-01-28 NOTE — TELEPHONE ENCOUNTER
Pt was informed.    Soon-Mi  --------------------------------------------------------------------------        ----- Message from BENOIT Razo CNP sent at 1/27/2022  4:22 PM CST -----  Regarding: wet prep  Wet prep was normal.  Go ahead and try the boric acid.     Mitzi LABOY, CHRIS    Please call her. No My Chart.

## 2022-02-18 ENCOUNTER — TELEPHONE (OUTPATIENT)
Dept: INTERNAL MEDICINE | Facility: CLINIC | Age: 67
End: 2022-02-18
Payer: COMMERCIAL

## 2022-02-18 DIAGNOSIS — E78.00 ELEVATED LDL CHOLESTEROL LEVEL: ICD-10-CM

## 2022-02-18 DIAGNOSIS — R51.9 HEADACHE: Primary | ICD-10-CM

## 2022-02-18 DIAGNOSIS — E46 MALNUTRITION (H): ICD-10-CM

## 2022-02-18 NOTE — TELEPHONE ENCOUNTER
[facility] Call Center    Phone Message    May a detailed message be left on voicemail: no     Reason for Call: Medication Question or concern regarding medication   Prescription Clarification  Name of Medication: Omega 3s and hydrochloride nasal spray  Prescribing Provider: rip Nettles   Pharmacy: [facility]   What on the order needs clarification? Pharmacy called to f/u on these medication requests          Action Taken: Message routed to:  [facility] deven

## 2022-02-23 RX ORDER — DIHYDROERGOTAMINE MESYLATE 4 MG/ML
SPRAY NASAL
Qty: 8 ML | Refills: 1 | Status: SHIPPED | OUTPATIENT
Start: 2022-02-23 | End: 2022-04-20

## 2022-03-03 NOTE — TELEPHONE ENCOUNTER
[facility] Call Center    Phone Message    May a detailed message be left on voicemail: yes     Reason for Call: Other: Pharmacy was calling on the status of the prescriptions.     Action Taken: Message routed to:  [facility] PCC    Travel Screening: Not Applicable

## 2022-03-22 ENCOUNTER — MEDICAL CORRESPONDENCE (OUTPATIENT)
Dept: HEALTH INFORMATION MANAGEMENT | Facility: CLINIC | Age: 67
End: 2022-03-22
Payer: COMMERCIAL

## 2022-03-24 DIAGNOSIS — J44.1 CHRONIC OBSTRUCTIVE PULMONARY DISEASE WITH ACUTE EXACERBATION (H): ICD-10-CM

## 2022-03-24 DIAGNOSIS — E11.9 TYPE 2 DIABETES MELLITUS WITHOUT COMPLICATION, WITHOUT LONG-TERM CURRENT USE OF INSULIN (H): ICD-10-CM

## 2022-03-24 RX ORDER — FLASH GLUCOSE SENSOR
KIT MISCELLANEOUS
Qty: 2 EACH | Refills: 11 | Status: SHIPPED | OUTPATIENT
Start: 2022-03-24 | End: 2022-06-01

## 2022-03-24 NOTE — TELEPHONE ENCOUNTER
[facility] Call Center    Phone Message    May a detailed message be left on voicemail: no     Reason for Call: Medication Refill Request    Has the patient contacted the pharmacy for the refill? Yes   Name of medication being requested: Diclofenac and diabetic testing supplies  Provider who prescribed the medication: Mitzi Nettles  Pharmacy: [facility]  Date medication is needed: 3/24/22         Action Taken: Message routed to:  [facility] med refills

## 2022-03-24 NOTE — TELEPHONE ENCOUNTER
Call to [facility] to obtain more information on location of pharmacy to link to prescriptions      diclofenac (VOLTAREN) 1 % topical gel      Historical entry only on inactive list  Last Office Visit : 1/27/22  Future Office visit:  5/26/22    Routing refill request to provider for review/approval because:  Drug not active on patient's medication list

## 2022-03-28 NOTE — TELEPHONE ENCOUNTER
[facility] Call Center    Phone Message    May a detailed message be left on voicemail: yes     Reason for Call: Other: [facility] calling back again about the status for the diabetic supplies.       Ref#: [id number]    [facility]  [address] [place] [address]    [facility] PH#: [phone]  [facility] Fax#: [phone]    Action Taken: Message routed to:  [facility] PCC    Travel Screening: Not Applicable

## 2022-03-30 RX ORDER — LIDOCAINE 4 G/G
PATCH TOPICAL EVERY 24 HOURS
Qty: 7 PATCH | Refills: 11 | Status: SHIPPED | OUTPATIENT
Start: 2022-03-30 | End: 2022-04-20

## 2022-03-31 ENCOUNTER — MEDICAL CORRESPONDENCE (OUTPATIENT)
Dept: HEALTH INFORMATION MANAGEMENT | Facility: CLINIC | Age: 67
End: 2022-03-31

## 2022-03-31 ENCOUNTER — ONCOLOGY VISIT (OUTPATIENT)
Dept: ONCOLOGY | Facility: CLINIC | Age: 67
End: 2022-03-31
Attending: INTERNAL MEDICINE
Payer: COMMERCIAL

## 2022-03-31 ENCOUNTER — ANCILLARY PROCEDURE (OUTPATIENT)
Dept: CT IMAGING | Facility: CLINIC | Age: 67
End: 2022-03-31
Payer: COMMERCIAL

## 2022-03-31 VITALS
OXYGEN SATURATION: 100 % | HEART RATE: 93 BPM | TEMPERATURE: 98 F | SYSTOLIC BLOOD PRESSURE: 117 MMHG | BODY MASS INDEX: 34.06 KG/M2 | WEIGHT: 211 LBS | DIASTOLIC BLOOD PRESSURE: 72 MMHG

## 2022-03-31 DIAGNOSIS — M62.838 MUSCLE SPASM: Primary | ICD-10-CM

## 2022-03-31 DIAGNOSIS — C34.91 ADENOCARCINOMA OF RIGHT LUNG (H): ICD-10-CM

## 2022-03-31 PROCEDURE — 71250 CT THORAX DX C-: CPT | Performed by: RADIOLOGY

## 2022-03-31 PROCEDURE — G0463 HOSPITAL OUTPT CLINIC VISIT: HCPCS

## 2022-03-31 PROCEDURE — 99214 OFFICE O/P EST MOD 30 MIN: CPT | Performed by: INTERNAL MEDICINE

## 2022-03-31 RX ORDER — CYCLOBENZAPRINE HCL 5 MG
5 TABLET ORAL 3 TIMES DAILY PRN
Qty: 30 TABLET | Refills: 0 | Status: SHIPPED | OUTPATIENT
Start: 2022-03-31 | End: 2022-04-20

## 2022-03-31 ASSESSMENT — PAIN SCALES - GENERAL: PAINLEVEL: WORST PAIN (10)
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
## 2022-03-31 NOTE — NURSING NOTE
"Oncology Rooming Note    March 31, 2022 9:28 AM   Jenn Aparicio is a 66 year old female who presents for:    Chief Complaint   Patient presents with     Oncology Clinic Visit     rtn for primary lung adenocarcinoma     Initial Vitals: /72   Pulse 93   Temp 98  F (36.7  C) (Oral)   Wt 95.7 kg (211 lb)   SpO2 100%   BMI 34.06 kg/m   Estimated body mass index is 34.06 kg/m  as calculated from the following:    Height as of 1/27/22: 1.676 m (5' 6\").    Weight as of this encounter: 95.7 kg (211 lb). Body surface area is 2.11 meters squared.  Worst Pain (10) Comment: Data Unavailable   No LMP recorded. Patient is postmenopausal.  Allergies reviewed: Yes  Medications reviewed: Yes    Medications: MEDICATION REFILLS NEEDED TODAY. Provider was notified.   Pt says she needs all prescriptions refilled due to usual service no longer filling them.    Pharmacy name entered into [facility]:    [facility] #[id number] - [address] AT [address] [place] [address] PHARMACY MAIL DELIVERY - [address]  [facility] #[id number] - [place] [id number] PHARMACY - [address]    Clinical concerns: Pt said she fell a few days ago and has been experiencing pain from neck to low back and has had difficulty keeping balance since then. She also has noticed difficulty eating for several weeks.  MD was notified.      Gloria Briones, EMT            "

## 2022-03-31 NOTE — TELEPHONE ENCOUNTER
[facility] Call Center    Phone Message    May a detailed message be left on voicemail: yes     Reason for Call: Other: Edy covarrub[facility] was calling regarding the refills for the fish oil, voltaren, and diabetic testing supplies. Please let patient know when it has been sent over.      Action Taken: Message routed to:  [facility] P[facility]    Travel Screening: Not Applicable

## 2022-04-06 ENCOUNTER — TELEPHONE (OUTPATIENT)
Dept: INTERNAL MEDICINE | Facility: CLINIC | Age: 67
End: 2022-04-06
Payer: COMMERCIAL

## 2022-04-06 DIAGNOSIS — E11.9 TYPE 2 DIABETES MELLITUS WITHOUT COMPLICATION, WITHOUT LONG-TERM CURRENT USE OF INSULIN (H): ICD-10-CM

## 2022-04-06 DIAGNOSIS — E11.40 DIABETIC NEUROPATHY (H): Primary | ICD-10-CM

## 2022-04-06 RX ORDER — GLUCOSAMINE HCL/CHONDROITIN SU 500-400 MG
CAPSULE ORAL
Qty: 100 EACH | Refills: 1 | Status: SHIPPED | OUTPATIENT
Start: 2022-04-06 | End: 2023-03-18

## 2022-04-06 NOTE — TELEPHONE ENCOUNTER
[facility] Call Center    Phone Message    May a detailed message be left on voicemail: yes     Reason for Call: Medication Question or concern regarding medication   Prescription Clarification  Name of Medication: Diclofenac gel 1 %, alcohol swabs  Prescribing Provider: Mitzi Nettles APRN CNP   Pharmacy: [facility]   What on the order needs clarification? Wondering about the status of these Rx. Please call back to provide status update.    #: [phone]  Fax#: [phone]      Action Taken: Message routed to:  [facility] P[facility]    Travel Screening: Not Applicable

## 2022-04-11 ENCOUNTER — TELEPHONE (OUTPATIENT)
Dept: INTERNAL MEDICINE | Facility: CLINIC | Age: 67
End: 2022-04-11
Payer: COMMERCIAL

## 2022-04-11 DIAGNOSIS — Z79.899 MEDICATION MANAGEMENT: Primary | ICD-10-CM

## 2022-04-11 NOTE — TELEPHONE ENCOUNTER
[facility] Call Center    Phone Message    May a detailed message be left on voicemail: yes     Reason for Call: Other: Patient stated she wants to get back on medication services and she wanted someone from the care team to call and discuss.     Action Taken: Message routed to:  [facility]    Travel Screening: Not Applicable

## 2022-04-11 NOTE — TELEPHONE ENCOUNTER
[facility] Call Center    Phone Message    May a detailed message be left on voicemail: yes     Reason for Call: Medication Question or concern regarding medication   Prescription Clarification  Name of Medication: diclofenac (VOLTAREN) 1 % topical gel   Prescribing Provider: Mitzi Nettles APRN CNP   Pharmacy: [facility] [address]   What on the order needs clarification? Pharmacy calling to report they did not receive Rx refill. Requesting that it is resent to Internal Fax #: [phone].          Action Taken: Message routed to:  [facility] P[facility]    Travel Screening: Not Applicable

## 2022-04-11 NOTE — CONFIDENTIAL NOTE
Rx switched to local print, signed, and faxed to to Internal Fax #: [phone].  Val Silver RN

## 2022-04-20 ENCOUNTER — VIRTUAL VISIT (OUTPATIENT)
Dept: PHARMACY | Facility: CLINIC | Age: 67
End: 2022-04-20
Payer: COMMERCIAL

## 2022-04-20 DIAGNOSIS — Z72.0 TOBACCO ABUSE: ICD-10-CM

## 2022-04-20 DIAGNOSIS — J44.9 CHRONIC OBSTRUCTIVE PULMONARY DISEASE, UNSPECIFIED COPD TYPE (H): ICD-10-CM

## 2022-04-20 DIAGNOSIS — E11.9 TYPE 2 DIABETES MELLITUS WITHOUT COMPLICATION, WITHOUT LONG-TERM CURRENT USE OF INSULIN (H): ICD-10-CM

## 2022-04-20 DIAGNOSIS — Z78.9 TAKES DIETARY SUPPLEMENTS: Primary | ICD-10-CM

## 2022-04-20 DIAGNOSIS — I10 ESSENTIAL HYPERTENSION: ICD-10-CM

## 2022-04-20 DIAGNOSIS — H04.123 DRY EYE SYNDROME OF BOTH EYES: ICD-10-CM

## 2022-04-20 DIAGNOSIS — M62.838 MUSCLE SPASM: ICD-10-CM

## 2022-04-20 DIAGNOSIS — J44.9 COPD (CHRONIC OBSTRUCTIVE PULMONARY DISEASE) (H): ICD-10-CM

## 2022-04-20 DIAGNOSIS — J30.2 SEASONAL ALLERGIC RHINITIS, UNSPECIFIED TRIGGER: ICD-10-CM

## 2022-04-20 PROCEDURE — 99607 MTMS BY PHARM ADDL 15 MIN: CPT | Performed by: PHARMACIST

## 2022-04-20 PROCEDURE — 99605 MTMS BY PHARM NP 15 MIN: CPT | Performed by: PHARMACIST

## 2022-04-20 RX ORDER — SODIUM CHLORIDE FOR INHALATION 3 %
3 VIAL, NEBULIZER (ML) INHALATION DAILY
Qty: 250 ML | Refills: 3 | Status: SHIPPED | OUTPATIENT
Start: 2022-04-20 | End: 2023-02-04

## 2022-04-20 RX ORDER — ALPHA LIPOIC ACID 300 MG
300 CAPSULE ORAL DAILY
Qty: 90 CAPSULE | Refills: 3 | Status: SHIPPED | OUTPATIENT
Start: 2022-04-20 | End: 2023-02-04

## 2022-04-20 RX ORDER — GLIPIZIDE 5 MG/1
5 TABLET ORAL
Qty: 180 TABLET | Refills: 3 | Status: SHIPPED | OUTPATIENT
Start: 2022-04-20 | End: 2022-06-01

## 2022-04-20 RX ORDER — BENZONATATE 200 MG/1
200 CAPSULE ORAL 3 TIMES DAILY PRN
Qty: 100 CAPSULE | Refills: 3 | Status: SHIPPED | OUTPATIENT
Start: 2022-04-20 | End: 2023-02-04

## 2022-04-20 RX ORDER — RIBOFLAVIN (VITAMIN B2) 100 MG
1 TABLET ORAL DAILY
Qty: 90 TABLET | Refills: 3 | Status: SHIPPED | OUTPATIENT
Start: 2022-04-20 | End: 2022-06-01

## 2022-04-20 RX ORDER — CETIRIZINE HYDROCHLORIDE 10 MG/1
10 TABLET ORAL DAILY
Qty: 30 TABLET | Refills: 10 | Status: SHIPPED | OUTPATIENT
Start: 2022-04-20 | End: 2022-06-01

## 2022-04-20 RX ORDER — ASPIRIN 81 MG/1
81 TABLET ORAL DAILY
Qty: 90 TABLET | Refills: 3 | Status: SHIPPED | OUTPATIENT
Start: 2022-04-20 | End: 2022-06-01

## 2022-04-20 RX ORDER — IPRATROPIUM BROMIDE AND ALBUTEROL SULFATE 2.5; .5 MG/3ML; MG/3ML
1 SOLUTION RESPIRATORY (INHALATION) EVERY 6 HOURS PRN
Qty: 1080 ML | Refills: 0 | Status: ON HOLD | OUTPATIENT
Start: 2022-04-20 | End: 2023-01-04

## 2022-04-20 RX ORDER — CYCLOBENZAPRINE HCL 5 MG
5 TABLET ORAL 3 TIMES DAILY PRN
Qty: 30 TABLET | Refills: 0 | Status: SHIPPED | OUTPATIENT
Start: 2022-04-20 | End: 2023-02-04

## 2022-04-20 RX ORDER — POLYETHYLENE GLYCOL 400 AND PROPYLENE GLYCOL 4; 3 MG/ML; MG/ML
1 SOLUTION/ DROPS OPHTHALMIC 4 TIMES DAILY
Qty: 5 ML | Refills: 11 | Status: SHIPPED | OUTPATIENT
Start: 2022-04-20 | End: 2023-10-06

## 2022-04-20 RX ORDER — OLOPATADINE HYDROCHLORIDE 2 MG/ML
1 SOLUTION/ DROPS OPHTHALMIC DAILY
Qty: 2.5 ML | Refills: 11 | Status: SHIPPED | OUTPATIENT
Start: 2022-04-20 | End: 2022-06-01

## 2022-04-20 RX ORDER — ALBUTEROL SULFATE 90 UG/1
AEROSOL, METERED RESPIRATORY (INHALATION)
Qty: 18 G | Refills: 3 | Status: SHIPPED | OUTPATIENT
Start: 2022-04-20 | End: 2022-06-01

## 2022-04-20 RX ORDER — AMLODIPINE BESYLATE 10 MG/1
10 TABLET ORAL DAILY
Qty: 90 TABLET | Refills: 3 | Status: SHIPPED | OUTPATIENT
Start: 2022-04-20 | End: 2022-06-01

## 2022-04-20 RX ORDER — MULTIPLE VITAMINS W/ MINERALS TAB 9MG-400MCG
1 TAB ORAL DAILY
Qty: 30 TABLET | Refills: 11 | Status: SHIPPED | OUTPATIENT
Start: 2022-04-20 | End: 2023-10-12

## 2022-04-20 NOTE — Clinical Note
Hello,  I had a visit with this patient of yours and we discussed her COPD therapy. We discussed the Trelegy inhaler and this was something she was interested in switching to, to decrease the number of inhalers she has to use.  I wanted to reach out to you to get your thoughts on switching her over to Trelegy?  Samy Prasad, Pharm. D., [facility] Medication Therapy Management Pharmacist Direct Voicemail: [phone]

## 2022-04-20 NOTE — LETTER
_  Medication List        Prepared on: 4/22/2022     Bring your Medication List when you go to the doctor, hospital, or   emergency room. And, share it with your family or caregivers.     Note any changes to how you take your medications.  Cross out medications when you no longer use them.    Medication How I take it Why I use it Prescriber   albuterol (PROAIR HFA/PROVENTIL HFA/VENTOLIN HFA) 108 (90 Base) MCG/ACT inhaler INHALE 1 OR 2 PUFFS BY MOUTH EVERY 4 HOURS AS NEEDED COPD Lynda Spann MD   alcohol swab prep pads Use to swab area of injection/xander as directed. Type 2 diabetes mellitus without complication, without long-term current use of insulin (H) BENOIT Razo CNP   Alpha-Lipoic Acid 300 MG CAPS Take 300 mg by mouth daily Essential Hypertension; Type 2 diabetes mellitus without complication, without long-term current use of insulin (H) BENOIT Razo CNP   amLODIPine (NORVASC) 10 MG tablet Take 1 tablet (10 mg) by mouth daily Essential Hypertension BENOIT Razo CNP   aspirin 81 MG EC tablet Take 1 tablet (81 mg) by mouth daily Essential Hypertension; Type 2 diabetes mellitus without complication, without long-term current use of insulin (H) BENOIT Razo CNP   benzonatate (TESSALON) 200 MG capsule Take 1 capsule (200 mg) by mouth 3 times daily as needed for cough Cough Lynda Spann MD   cetirizine (ZYRTEC) 10 MG tablet Take 1 tablet (10 mg) by mouth daily Seasonal allergic rhinitis, unspecified trigger Lynda Spann MD   coenzyme Q-10 (CO-Q10) 50 MG capsule Take 1 capsule (50 mg) by mouth daily Essential Hypertension BENOIT Razo CNP   cyclobenzaprine (FLEXERIL) 5 MG tablet Take 1 tablet (5 mg) by mouth 3 times daily as needed for muscle spasms Muscle Spasm BENOIT Razo CNP   doxycycline hyclate (VIBRAMYCIN) 100 MG capsule Take 100 mg twice daily for five days if needed for treatment of a COPD exacerbation. Use  alongside prednisone. Chronic obstructive pulmonary disease with acute exacerbation (H) Lynda Spann MD   fluticasone-vilanterol (BREO ELLIPTA) 200-25 MCG/INH inhaler Inhale 1 puff into the lungs daily COPD (Chronic Obstructive Pulmonary Disease) (H) Lynda Spann MD   glipiZIDE (GLUCOTROL) 5 MG tablet Take 1 tablet (5 mg) by mouth 2 times daily (before meals) diabetes BENOIT Razo CNP   glucosamine-chondroitin 500-400 MG tablet Take 1 tablet by mouth daily Type 2 diabetes mellitus without complication, without long-term current use of insulin (H) BENOIT Razo CNP   ipratropium - albuterol 0.5 mg/2.5 mg/3 mL (DUONEB) 0.5-2.5 (3) MG/3ML neb solution Take 1 vial (3 mLs) by nebulization every 6 hours as needed for shortness of breath / dyspnea or wheezing COPD Lynda Spann MD   multivitamin w/minerals (THERA-VIT-M) tablet Take 1 tablet by mouth daily Supplement BENOIT Razo CNP   nicotine (SM NICOTINE) 14 MG/24HR 24 hr patch Place 1 patch onto the skin every 24 hours Current tobacco use BENOIT Razo CNP   olopatadine (PATADAY) 0.2 % ophthalmic solution Place 1 drop into both eyes daily Dry Eye BENOIT Razo CNP   Omega-3-Acid Eth Est, Dietary, 1 g CAPS Take 2 capfuls by mouth 2 times daily Cholesterol BENOIT Razo CNP   polyethylene glycol-propylene glycol (SYSTANE) 0.4-0.3 % SOLN ophthalmic solution Place 1 drop into both eyes 4 times daily Dry eye syndrome of both eyes BENOIT Razo CNP   predniSONE (DELTASONE) 20 MG tablet Take 40 mg daily for 5 days if needed for treatment of a COPD exacerbation, use alongside doxycycline Chronic obstructive pulmonary disease with acute exacerbation (H) Lynda Spann MD   sodium chloride (NEBUSAL) 3 % neb solution Take 3 mLs by nebulization daily Use 1-2 times daily in combination with Duoneb and Aerobika to help with mucus clearance COPD Lynda Spann  MD   umeclidinium (INCRUSE ELLIPTA) 62.5 MCG/INH inhaler Inhale 1 puff into the lungs daily COPD Lynda Spann MD         Add new medications, over-the-counter drugs, herbals, vitamins, or  minerals in the blank rows below.    Medication How I take it Why I use it Prescriber                          Allergies:      interferons; penicillins; aspirin; colon care        Side effects I have had:               Other Information:              My notes and questions:

## 2022-04-20 NOTE — Clinical Note
Hello,  I did not discuss this in depth with the patient, but due to her diabetes and cholesterol she would benefit from a moderate or high intensity statin. I would recommend trying 40 mg of rosuvastatin daily due to her 10 year ASCVD risk score of 24%.  Let me know your thoughts,  Samy Prasad, Pharm. D., [facility] Medication Therapy Management Pharmacist Direct Voicemail: [phone]

## 2022-04-20 NOTE — PROGRESS NOTES
Medication Therapy Management (MTM) Encounter    ASSESSMENT:                            Medication Adherence/Access: No issues identified    COPD: She reports that she has not tried the Trelegy inhaler. This could be beneficial for her so she could decrease the number of inhalers that she uses.     Allergies: Stable.     Pain: Stable.     Type 2 Diabetes:  She would benefit from quitting soda and reports that she is willing to do this. She would also benefit from escalation of her therapy. She also is not on a statin and while this was not discussed in depth today during the visit I will recommend this to the PCP.     Hypertension: Stable. At goal of <130/80 mmHg.    Supplement: Stable.     Smoking: She should continue with her plans of quitting after she moves.     Dry Eye: Stable.       PLAN:                            1. I will send your medications to the [place] mail order pharmacy.  2. Work on quitting soda.    3. I will send a message to Dr. Spann to ask about Trelegy.  4. Consider using the nicotine patches again to help you quit smoking.  5. Take glipizide twice daily as prescribed.   6. You should discuss statin therapy with Mitzi Nettles next month.     Follow-up: Please call [phone] to set up another appointment with me in 3 months.     Mail AVS.     SUBJECTIVE/OBJECTIVE:                          Jenn Aparicio is a 66 year old female called for an initial visit. She was referred to me from BENOIT Ruiz CNP.      Reason for visit: CMR.    Allergies/ADRs: Reviewed in chart  Past Medical History: Reviewed in chart  Tobacco: She reports that she has been smoking cigarettes. She has been smoking about 0.25 packs per day. She has quit using smokeless tobacco.Tobacco Cessation Action Plan:   Information offered: Patient not interested at this time  Has been smoking cigarrettes. 1 every other day. She is in the process of moving and will work on this once she moves.   Alcohol:  "none    Medication Adherence/Access: no issues reported - was previously going with [facility] and they are no longer filling them with for. Wants to have all her medications sent to [facility] mailorder    COPD: Current medications: ICS/LABA- Breo 1 puff(s) once daily  LAMA- Incruse Ellipta 1 puff(s) once daily  Prednisone rarely  Short-Acting Bronchodilator: Albuterol MDI and pt reports using 2-3 times per day., Ipratropium/Albuterol Nebs and pt reports using 2 times per day.Patient rinses their mouth after using steroid inhaler.    Takes benzonatate 200 mg as needed and this helps when she needs it. She also has prescriptions for doxycycline and prednisone as needed on her profile for when she has COPD exacerbations.   Patient is not experiencing side effects.   Patient reports the following symptoms: When it gets bad she uses the oxygen.    Allergies: Currently taking cetirizine 10 mg daily and reports this is helpful. No concerns or questions at this time.     Pain: Currently taking alpha-lipoic acid 300 mg daily. Has gabapentin but she doesn't take this anymore. Uses cyclobenzaprine 5 mg as needed sparingly. Reports that she has headaches when she lays down. She never got the migranal nasal spray. Reports that Tylenol is good for her stomach. Used to use lidocaine patches but did not think they work very well.      Type 2 Diabetes:  Currently taking glipizide 5 mg twice daily (has been taking once daily she thinks). Previously had issues tolerating metformin. Reports that previously insulin caused her to go hypoglycemic. Reports that she has copays on her medications.  Patient is not experiencing side effects.  Blood sugar monitoring: never.  Symptoms of low blood sugar? shaky  Symptoms of high blood sugar? none  Eye exam: up to date  Foot exam: up to date  Diet/Exercise: \"I don't eat too much\" Has been drinking soda a couple cans per day. Reports that she is losing some weight right now. She tries to get a " walk in as much as she can.  Aspirin: Taking 81mg daily and denies side effects   Statin: No   ACEi/ARB: No.   Urine Albumin: No results found for: UMALCR   Lab Results   Component Value Date    A1C 8.4 01/27/2022    A1C 8.3 01/11/2022    A1C 7.0 02/24/2021    A1C 6.8 09/08/2020    A1C 7.1 12/01/2015         Recent Labs   Lab Test 01/27/22  1439 01/11/22  0845   CHOL 251* 224*   HDL 58 55   * 149*   TRIG 127 102     The 10-year ASCVD risk score (Nicole KAPOOR Jr., et al., 2013) is: 24.3%    Values used to calculate the score:      Age: 66 years      Sex: Female      Is Non- : No      Diabetic: Yes      Tobacco smoker: Yes      Systolic Blood Pressure: 117 mmHg      Is BP treated: Yes      HDL Cholesterol: 58 mg/dL      Total Cholesterol: 251 mg/dL  Recent Labs   Lab Test 01/27/22  1439 01/11/22  0845   CHOL 251* 224*   HDL 58 55   * 149*   TRIG 127 102         Hypertension: Current medications include amlodipine 10 mg. She is also taking co-enzyme Q-10 for preventative measures.   Patient does not self-monitor blood pressure.  Patient reports the following medication side effects: swelling in ankles off and on.  BP Readings from Last 3 Encounters:   03/31/22 117/72   01/27/22 138/84   01/11/22 (!) 149/80     Supplement: Currently taking multivitamin daily. Ran out and didn't have refills. She has a prescription for Omega 3 capsules but reports she couldn't pay for them. She would like a refill of this to see if the insurance will pay for it now.     Smoking: She reports that she has nicotine patches available but is not using them. She smokes infrequently and is going to make a point to quit once she is done moving.     Dry Eye: Currently taking olopatadine 0.2% eye drops 2x a day as needed and systane eye drops. No concerns or questions at this time.     Today's Vitals: There were no vitals taken for this visit.  ----------------      I spent 38 minutes with this patient today. All  changes were made via collaborative practice agreement with BENOIT Ruiz CNP. A copy of the visit note was provided to the patient's provider(s).    The patient was mailed a summary of these recommendations.     Samy Prasad, Pharm. D., [facility]  Medication Therapy Management Pharmacist  Direct Voicemail: [phone]    Telemedicine Visit Details  Type of service:  Telephone visit  Start Time: 2:00 pm  End Time: 2:38 PM  Originating Location (patient location): [place]  Distant Location (provider location):  [facility]     Medication Therapy Recommendations  COPD (chronic obstructive pulmonary disease) (H)    Current Medication: fluticasone-vilanterol (BREO ELLIPTA) 200-25 MCG/INH inhaler   Rationale: More effective medication available - Ineffective medication - Effectiveness   Recommendation: Change Medication - TRELEGY ELLIPTA IN   Status: Contact Provider - Awaiting Response         Type 2 diabetes mellitus without complication, without long-term current use of insulin (H)    Current Medication: glipiZIDE (GLUCOTROL) 5 MG tablet   Rationale: Does not understand instructions - Adherence - Adherence   Recommendation: Provide Education   Status: Patient Agreed - Adherence/Education          Rationale: Preventive therapy - Needs additional medication therapy - Indication   Recommendation: Start Medication - Rosuvastatin Calcium 40 MG Cpsp   Status: Contact Provider - Awaiting Response

## 2022-04-20 NOTE — LETTER
April 22, 2022  Jenn Aparicio  [address] [place] [address]    Dear Ms. Aparicio, [facility]        Thank you for talking with me on Apr 20, 2022 about your health and medications. As a follow-up to our conversation, I have included two documents:      1. Your Recommended To-Do List has steps you should take to get the best results from your medications.  2. Your Medication List will help you keep track of your medications and how to take them.    If you want to talk about these documents, please call Samy Prasad RPH at phone: [phone], Monday-Friday 8-4:30pm.    I look forward to working with you and your doctors to make sure your medications work well for you.    Sincerely,    Samy Prasad RPH  [facility] Pharmacist, [facility]

## 2022-04-20 NOTE — LETTER
"Recommended To-Do List      Prepared on: 4/22/2022     You can get the best results from your medications by completing the items on this \"To-Do List.\"      Bring your To-Do List when you go to your doctor. And, share it with your family or caregivers.    My To-Do List:  What we talked about: What I should do:   The Trelegy inhaler could be an option for you    I will discuss with your pulmonologist about switching to Trelegy          What we talked about: What I should do:   A new medication that may be of benefit to you    I will discuss with Mitzi about starting Rosuvastatin for cholesterol and heart protection.          What we talked about: What I should do:   The importance of taking your medication as intended    Education: Take glipizide 5 mg twice a day.           What we talked about: What I should do:                       "

## 2022-04-22 NOTE — PATIENT INSTRUCTIONS
"Recommendations from today's MTM visit:                                                    MTM (medication therapy management) is a service provided by a clinical pharmacist designed to help you get the most of out of your medicines.   Today we reviewed what your medicines are for, how to know if they are working, that your medicines are safe and how to make your medicine regimen as easy as possible.      1. I will send your medications to the [place] mail order pharmacy.  2. Work on quitting soda.    3. I will send a message to Dr. Spann to ask about Trelegy.  4. Consider using the nicotine patches again to help you quit smoking.  5. Take glipizide twice daily as prescribed.   6. You should discuss statin therapy with Mitzi Nettles next month.     Follow-up: Please call [phone] to set up another appointment with me in 3 months.     It was great speaking with you today.  I value your experience and would be very thankful for your time in providing feedback in our clinic survey. In the next few days, you may receive an email or text message from [employer] with a link to a survey related to your  clinical pharmacist.\"     To schedule another MTM appointment, please call the clinic directly or you may call the MTM scheduling line at [phone] or toll-free at [phone].     My Clinical Pharmacist's contact information:                                                      Please feel free to contact me with any questions or concerns you have.      Samy Prasad, Pharm. D., [facility]  Medication Therapy Management Pharmacist  Direct Voicemail: [phone]     "

## 2022-05-04 ENCOUNTER — OFFICE VISIT (OUTPATIENT)
Dept: PULMONOLOGY | Facility: CLINIC | Age: 67
End: 2022-05-04
Attending: STUDENT IN AN ORGANIZED HEALTH CARE EDUCATION/TRAINING PROGRAM
Payer: COMMERCIAL

## 2022-05-04 ENCOUNTER — LAB (OUTPATIENT)
Dept: LAB | Facility: CLINIC | Age: 67
End: 2022-05-04
Payer: COMMERCIAL

## 2022-05-04 ENCOUNTER — ANCILLARY PROCEDURE (OUTPATIENT)
Dept: CT IMAGING | Facility: CLINIC | Age: 67
End: 2022-05-04
Attending: INTERNAL MEDICINE
Payer: COMMERCIAL

## 2022-05-04 VITALS
DIASTOLIC BLOOD PRESSURE: 67 MMHG | WEIGHT: 211 LBS | BODY MASS INDEX: 33.91 KG/M2 | SYSTOLIC BLOOD PRESSURE: 131 MMHG | HEIGHT: 66 IN | HEART RATE: 95 BPM | OXYGEN SATURATION: 97 %

## 2022-05-04 DIAGNOSIS — C34.91 ADENOCARCINOMA OF RIGHT LUNG (H): ICD-10-CM

## 2022-05-04 DIAGNOSIS — J44.1 CHRONIC OBSTRUCTIVE PULMONARY DISEASE WITH ACUTE EXACERBATION (H): Primary | ICD-10-CM

## 2022-05-04 DIAGNOSIS — J42 CHRONIC BRONCHITIS, UNSPECIFIED CHRONIC BRONCHITIS TYPE (H): ICD-10-CM

## 2022-05-04 DIAGNOSIS — J30.2 SEASONAL ALLERGIC RHINITIS, UNSPECIFIED TRIGGER: ICD-10-CM

## 2022-05-04 LAB
ALBUMIN SERPL-MCNC: 3.4 G/DL (ref 3.4–5)
ALP SERPL-CCNC: 93 U/L (ref 40–150)
ALT SERPL W P-5'-P-CCNC: 18 U/L (ref 0–50)
ANION GAP SERPL CALCULATED.3IONS-SCNC: 6 MMOL/L (ref 3–14)
AST SERPL W P-5'-P-CCNC: 18 U/L (ref 0–45)
BASOPHILS # BLD AUTO: 0 10E3/UL (ref 0–0.2)
BASOPHILS NFR BLD AUTO: 1 %
BILIRUB SERPL-MCNC: 0.3 MG/DL (ref 0.2–1.3)
BUN SERPL-MCNC: 4 MG/DL (ref 7–30)
CALCIUM SERPL-MCNC: 8.8 MG/DL (ref 8.5–10.1)
CHLORIDE BLD-SCNC: 102 MMOL/L (ref 94–109)
CO2 SERPL-SCNC: 33 MMOL/L (ref 20–32)
CREAT BLD-MCNC: 0.5 MG/DL (ref 0.5–1)
CREAT SERPL-MCNC: 0.59 MG/DL (ref 0.52–1.04)
EOSINOPHIL # BLD AUTO: 0.1 10E3/UL (ref 0–0.7)
EOSINOPHIL NFR BLD AUTO: 1 %
ERYTHROCYTE [DISTWIDTH] IN BLOOD BY AUTOMATED COUNT: 12.9 % (ref 10–15)
GFR SERPL CREATININE-BSD FRML MDRD: >60 ML/MIN/1.73M2
GFR SERPL CREATININE-BSD FRML MDRD: >90 ML/MIN/1.73M2
GLUCOSE BLD-MCNC: 148 MG/DL (ref 70–99)
HCT VFR BLD AUTO: 44.3 % (ref 35–47)
HGB BLD-MCNC: 13.9 G/DL (ref 11.7–15.7)
IMM GRANULOCYTES # BLD: 0 10E3/UL
IMM GRANULOCYTES NFR BLD: 0 %
LYMPHOCYTES # BLD AUTO: 1.6 10E3/UL (ref 0.8–5.3)
LYMPHOCYTES NFR BLD AUTO: 25 %
MCH RBC QN AUTO: 28.6 PG (ref 26.5–33)
MCHC RBC AUTO-ENTMCNC: 31.4 G/DL (ref 31.5–36.5)
MCV RBC AUTO: 91 FL (ref 78–100)
MONOCYTES # BLD AUTO: 0.6 10E3/UL (ref 0–1.3)
MONOCYTES NFR BLD AUTO: 10 %
NEUTROPHILS # BLD AUTO: 4 10E3/UL (ref 1.6–8.3)
NEUTROPHILS NFR BLD AUTO: 63 %
NRBC # BLD AUTO: 0 10E3/UL
NRBC BLD AUTO-RTO: 0 /100
PLATELET # BLD AUTO: 183 10E3/UL (ref 150–450)
POTASSIUM BLD-SCNC: 3.6 MMOL/L (ref 3.4–5.3)
PROT SERPL-MCNC: 7.1 G/DL (ref 6.8–8.8)
RBC # BLD AUTO: 4.86 10E6/UL (ref 3.8–5.2)
SODIUM SERPL-SCNC: 141 MMOL/L (ref 133–144)
WBC # BLD AUTO: 6.4 10E3/UL (ref 4–11)

## 2022-05-04 PROCEDURE — 36415 COLL VENOUS BLD VENIPUNCTURE: CPT | Performed by: PATHOLOGY

## 2022-05-04 PROCEDURE — 250N000009 HC RX 250: Performed by: STUDENT IN AN ORGANIZED HEALTH CARE EDUCATION/TRAINING PROGRAM

## 2022-05-04 PROCEDURE — 74177 CT ABD & PELVIS W/CONTRAST: CPT | Performed by: RADIOLOGY

## 2022-05-04 PROCEDURE — 99214 OFFICE O/P EST MOD 30 MIN: CPT | Mod: GC | Performed by: STUDENT IN AN ORGANIZED HEALTH CARE EDUCATION/TRAINING PROGRAM

## 2022-05-04 PROCEDURE — 80053 COMPREHEN METABOLIC PANEL: CPT | Performed by: PATHOLOGY

## 2022-05-04 PROCEDURE — G0463 HOSPITAL OUTPT CLINIC VISIT: HCPCS | Mod: 25

## 2022-05-04 PROCEDURE — 82565 ASSAY OF CREATININE: CPT | Mod: 59 | Performed by: PATHOLOGY

## 2022-05-04 PROCEDURE — 85025 COMPLETE CBC W/AUTO DIFF WBC: CPT | Performed by: PATHOLOGY

## 2022-05-04 PROCEDURE — 71260 CT THORAX DX C+: CPT | Performed by: RADIOLOGY

## 2022-05-04 RX ORDER — ALBUTEROL SULFATE 0.83 MG/ML
2.5 SOLUTION RESPIRATORY (INHALATION) ONCE
Status: COMPLETED | OUTPATIENT
Start: 2022-05-04 | End: 2022-05-04

## 2022-05-04 RX ORDER — IOPAMIDOL 755 MG/ML
130 INJECTION, SOLUTION INTRAVASCULAR ONCE
Status: COMPLETED | OUTPATIENT
Start: 2022-05-04 | End: 2022-05-04

## 2022-05-04 RX ADMIN — ALBUTEROL SULFATE 2.5 MG: 2.5 SOLUTION RESPIRATORY (INHALATION) at 16:20

## 2022-05-04 RX ADMIN — IOPAMIDOL 130 ML: 755 INJECTION, SOLUTION INTRAVASCULAR at 13:41

## 2022-05-04 ASSESSMENT — PAIN SCALES - GENERAL: PAINLEVEL: MODERATE PAIN (5)

## 2022-05-04 NOTE — NURSING NOTE
Chief Complaint   Patient presents with     Follow Up     COPD      Vitals were taken and medications were reconciled.     Eloise Duarte RMA  3:34 PM

## 2022-05-04 NOTE — PROGRESS NOTES
[facility]    Pulmonary, Allergy, Critical Care and Sleep Medicine    Clinic Progress Note  05/04/2022    Assessment and Recommendations:    Jenn Aparicio is a 67 yo female with PMH of Very Severe COPD (FEV1 24%) on 2L O2, RLL IA2 [id number] adenocarcinoma s/p lobectomy 2/2016 and suspected RUL [id number] IA2 NSCLC s/p SBRT 1/2020, Tobacco use, DMII, and HTN who presents for follow up of COPD.    Assessment and Recommendations  Chronic Hypoxic Respiratory Failure  Very Severe COPD with acute exacerbation  Obstructive disease with ongoing poor control due to interruptions in controller medication use, active smoking, and environmental exposures. Suspect currently in the midst of COPD exacerbation possibly due to viral infection (sick grandchild) that worsened due to being off inhalers for the past two weeks. Grossly wheezy on exam today and provided with albuterol neb in clinic. Will have her use exacerbation prednisone and doxycycline that has at home and order new inhaler to local pharmacy given issues with mail order. Will continue PRN albuterol and Duonebs, and will add Flonase given reports of increased mucus at night. Reports episodes of significant exertional hypoxia in recent days. Hopefully oxygen levels will improve once exacerbation treated, but once has stabilized will repeat 6MW to ensure oxygen prescription remains appropriate.   - Albuterol neb today  - Start of doxycycline and prednisone for exacerbation treatment  - Trelegy ordered to local pharmacy to replace Incruse and Breo  - Flonase for use before bed to treat possible PND which could be exacerbating nocturnal cough  - Will attempt to perform 6MW with oxygen titration in about a month once hopefully recovered from acute exacerbation    Suspected DAVID  Previously evaluated by Sleep (3/2021) with recommendation for lab sleep study given likely DAVID (STOP BANG 4/8).   - Will contact Sleep to see if can schedule    Lung  Adenocarcinoma  Underwent RLL lobectomy in 2016 and SBRT to RUL nodule in Jan 2020. New RUL and ROBERT nodules seen on CT in last several months, unknown significance at this time. CT chest today shows nodules stable in size but with new indeterminate ROBERT opacity.  - Will discuss CT findings with Dr. Leung and Oncology team, involve Interventional Pulmonary if needed for input on biopsy potential    Follow up in 3 months    Seen and discussed with Dr. Perlman Christine Lambert, MD PhD  Pulmonary and Critical Care Fellow   Pager [phone]     Subjective, Interval history:   Established with Pulmonary December 2020 after transferring Oncology care to [facility]. At time of first visit had been off ICS-LABA for a while but was still taking LAMA and albuterol. Admitted to [facility] 2/23-26/21 for COPD exacerbation. Discharged to complete prednisone taper and levofloxacin course with referrals for Sleep and Rehab. Sleep evaluation 3/30/21, suspicion for sleep apnea with recommendation for in-lab sleep study.     Last Pulmonary visit via telephone April 2021 at which point improved from prior but still with daily cough and dyspnea. Using 3L at baseline and Duonebs regularly throughout the day. Completed a course of home rehab. Smoking about 1-2 cigarettes per day but wanting to quit. Most recently on Incruse and Breo. Recent chest CTs have shown a couple new nodules that are being closely monitored.     Today reports trouble breathing for the last few days. Is having trouble getting all her mail order medications and has been off her inhalers, blood pressure medications, and diabetes meds for a couple weeks. Needing to use Duonebs and albuterol every 4-6 hours, trading off between the two. Does feel congestion and that coughing more. Cough has been clear, no blood, using Tessalon for relief. Has felt mucus at night that wakes her up with a choking sensation. Felt feverish once, no chills, no night sweats. Using 3L oxygen with up  to 4-5L when do activity. Reports O2 sats have dropped to around 70% recently when active and will feel head spin.     Has been around grandchildren recently, one of whom had a cold.   Hasn't been smoking for the last couple weeks. Still trying to get a new apartment.      Exposure History   Current apartment in a building with poor air quality, dust, significant secondhand smoke.     Oncology history taken from Oncology notes and chart review:  11/2015 - SOB, CT with medial RLL nodule  12/2015 - PET scan - Hypermetabolic RLL, no mets  1/2016 - Biopsy favoring adenocarcinoma  2/2016 - RLL lobectomy, pathology confirmed poorly differentiated adeno  10/2019 - Admission for acute respiratory failure 2/2 COPD exacerbation with new RUL nodule on CT  11/2019 PET - Hypermetabolic RUL nodule, no mets  12/2019 - Not surgical/biopsy candidate given emphysema, poor lung function  1/14/2020 - 1/28/2020 Empiric SBRT  11/2021 thorough 3/2022 - new nodules on sequential CTs (RUL, ROBERT)    Objective:   Medications:  Current Outpatient Medications   Medication     albuterol (PROAIR HFA/PROVENTIL HFA/VENTOLIN HFA) 108 (90 Base) MCG/ACT inhaler     alcohol swab prep pads     Alpha-Lipoic Acid 300 MG CAPS     amLODIPine (NORVASC) 10 MG tablet     aspirin 81 MG EC tablet     benzonatate (TESSALON) 200 MG capsule     cetirizine (ZYRTEC) 10 MG tablet     coenzyme Q-10 (CO-Q10) 50 MG capsule     Continuous Blood Gluc Sensor (FREESTYLE GIOVANNI 14 DAY SENSOR) MISC     cyclobenzaprine (FLEXERIL) 5 MG tablet     doxycycline hyclate (VIBRAMYCIN) 100 MG capsule     fluticasone-vilanterol (BREO ELLIPTA) 200-25 MCG/INH inhaler     glipiZIDE (GLUCOTROL) 5 MG tablet     glucosamine-chondroitin 500-400 MG tablet     ipratropium - albuterol 0.5 mg/2.5 mg/3 mL (DUONEB) 0.5-2.5 (3) MG/3ML neb solution     multivitamin w/minerals (THERA-VIT-M) tablet     nicotine (SM NICOTINE) 14 MG/24HR 24 hr patch     olopatadine (PATADAY) 0.2 % ophthalmic solution      "Omega-3-Acid Eth Est, Dietary, 1 g CAPS     polyethylene glycol-propylene glycol (SYSTANE) 0.4-0.3 % SOLN ophthalmic solution     predniSONE (DELTASONE) 20 MG tablet     sodium chloride (NEBUSAL) 3 % neb solution     umeclidinium (INCRUSE ELLIPTA) 62.5 MCG/INH inhaler     No current facility-administered medications for this visit.     Physical Exam:     /67 (BP Location: Right arm, Patient Position: Chair, Cuff Size: Adult Large)   Pulse 95   Ht 1.676 m (5' 6\")   Wt 95.7 kg (211 lb)   SpO2 97%   BMI 34.06 kg/m      General: Sitting up, NAD  HEENT: anicteric  Neck: no palpable lymphadenopathy  Chest: Non-labored breathing and speech on baseline oxygen, diffuse expiratory wheezing, no crackles, no cough  Cardiac: RRR no murmurs  Abdomen: Soft, active BS  Extremities: No LE Edema  Neuro: A&Ox3, no focal deficits   Skin: no rash noted on limited exam  Psych: Appropriate    Labs and imaging: All laboratory and imaging data personally reviewed.      CT CHEST/ABDOMEN/PELVIS WITH CONTRAST May 4, 2022 1:54 PM     CLINICAL HISTORY: Restage non-small cell lung cancer, new right lower  lobe nodule in Jan 2022 slightly larger in 3/2022, new left upper lobe  nodule. Adenocarcinoma of right lung (H).     TECHNIQUE: CT scan of the chest, abdomen, and pelvis was performed  following injection of IV contrast. Multiplanar reformats were  obtained. Dose reduction techniques were used.   CONTRAST: Isovue 370 130cc.     COMPARISON: CT chest 3/31/2022, CT chest, abdomen and pelvis 4/2/2021.     FINDINGS:   LUNGS AND PLEURA: Diffuse emphysema. No effusions. Stable cavitary 0.7  cm right apex nodule series 6 image 26. Posterior right upper lobe  subpleural nodule opacity stable measuring 0.6 cm series 6 image 88  with adjacent ill-defined opacities posteriorly. Stable solid  posterior right upper lobe 0.9 cm nodule image 116. Stable irregular  solid 0.5 cm left upper lobe midchest nodule series 6 image 115.  However, there are " new patchy areas of irregular nodular opacity newly  seen. For example just inferior to the aforementioned nodule is a 0.9  cm additional nodule image 119.     MEDIASTINUM/AXILLAE: Several small mediastinal lymph nodes again  noted. One of these at the AP window continues to be 1 cm series 3  image 97. No convincing new adenopathy. No acute mediastinal  abnormality.     CORONARY ARTERY CALCIFICATION: Mild.     HEPATOBILIARY: No new liver lesion. Gallbladder unremarkable.     PANCREAS: Normal.     SPLEEN: Normal.     ADRENAL GLANDS: Stable mild bilateral adrenal thickening.     KIDNEYS/BLADDER: No new significant renal or bladder abnormality. No  hydronephrosis.     BOWEL: No acute abnormality. Normal appendix.     PELVIC ORGANS: Normal.     ADDITIONAL FINDINGS: Scattered vascular calcifications. No new  adenopathy is seen within the abdomen or pelvis.     MUSCULOSKELETAL: No aggressive bone lesion identified.                                                                      IMPRESSION:  1.  Several bilateral pulmonary nodules again identified. A nodule at  the left upper lobe midchest is stable in size. However, new finding  of patchy irregular adjacent nodules and ill-defined opacities within  this region noted. This is indeterminate. Progression of disease at  this location is not excluded, though this could also relate to an  infectious or inflammatory cause such as pneumonitis. The other  several nodules appear to be stable.  2.  Remainder of the scan is stable with no new disease identified.     HARMONY HOPE MD

## 2022-05-04 NOTE — LETTER
5/4/2022         RE: Jenn Aparicio  [address]  [place] [address]        Dear Colleague,    Thank you for referring your patient, Jenn Aparicio, to the [facility] FOR LUNG SCIENCE AND [facility] [place]. Please see a copy of my visit note below.    [facility]    Pulmonary, Allergy, Critical Care and Sleep Medicine    Clinic Progress Note  05/04/2022    Assessment and Recommendations:    Jenn Aparicio is a 67 yo female with PMH of Very Severe COPD (FEV1 24%) on 2L O2, RLL IA2 [id number] adenocarcinoma s/p lobectomy 2/2016 and suspected RUL [id number] IA2 NSCLC s/p SBRT 1/2020, Tobacco use, DMII, and HTN who presents for follow up of COPD.    Assessment and Recommendations  Chronic Hypoxic Respiratory Failure  Very Severe COPD with acute exacerbation  Obstructive disease with ongoing poor control due to interruptions in controller medication use, active smoking, and environmental exposures. Suspect currently in the midst of COPD exacerbation possibly due to viral infection (sick grandchild) that worsened due to being off inhalers for the past two weeks. Grossly wheezy on exam today and provided with albuterol neb in clinic. Will have her use exacerbation prednisone and doxycycline that has at home and order new inhaler to local pharmacy given issues with mail order. Will continue PRN albuterol and Duonebs, and will add Flonase given reports of increased mucus at night. Reports episodes of significant exertional hypoxia in recent days. Hopefully oxygen levels will improve once exacerbation treated, but once has stabilized will repeat 6MW to ensure oxygen prescription remains appropriate.   - Albuterol neb today  - Start of doxycycline and prednisone for exacerbation treatment  - Trelegy ordered to local pharmacy to replace Incruse and Breo  - Flonase for use before bed to treat possible PND which could be exacerbating nocturnal cough  - Will attempt to perform 6MW with  oxygen titration in about a month once hopefully recovered from acute exacerbation    Suspected DAVID  Previously evaluated by Sleep (3/2021) with recommendation for lab sleep study given likely DAVID (STOP BANG 4/8).   - Will contact Sleep to see if can schedule    Lung Adenocarcinoma  Underwent RLL lobectomy in 2016 and SBRT to RUL nodule in Jan 2020. New RUL and ROBERT nodules seen on CT in last several months, unknown significance at this time. CT chest today shows nodules stable in size but with new indeterminate ROBERT opacity.  - Will discuss CT findings with Dr. Leung and Oncology team, involve Interventional Pulmonary if needed for input on biopsy potential    Follow up in 3 months    Seen and discussed with Dr. Perlman Christine Lambert, MD PhD  Pulmonary and Critical Care Fellow   Pager [phone]     Subjective, Interval history:   Established with Pulmonary December 2020 after transferring Oncology care to [facility]. At time of first visit had been off ICS-LABA for a while but was still taking LAMA and albuterol. Admitted to [facility] 2/23-26/21 for COPD exacerbation. Discharged to complete prednisone taper and levofloxacin course with referrals for Sleep and Rehab. Sleep evaluation 3/30/21, suspicion for sleep apnea with recommendation for in-lab sleep study.     Last Pulmonary visit via telephone April 2021 at which point improved from prior but still with daily cough and dyspnea. Using 3L at baseline and Duonebs regularly throughout the day. Completed a course of home rehab. Smoking about 1-2 cigarettes per day but wanting to quit. Most recently on Incruse and Breo. Recent chest CTs have shown a couple new nodules that are being closely monitored.     Today reports trouble breathing for the last few days. Is having trouble getting all her mail order medications and has been off her inhalers, blood pressure medications, and diabetes meds for a couple weeks. Needing to use Duonebs and albuterol every 4-6 hours,  trading off between the two. Does feel congestion and that coughing more. Cough has been clear, no blood, using Tessalon for relief. Has felt mucus at night that wakes her up with a choking sensation. Felt feverish once, no chills, no night sweats. Using 3L oxygen with up to 4-5L when do activity. Reports O2 sats have dropped to around 70% recently when active and will feel head spin.     Has been around grandchildren recently, one of whom had a cold.   Hasn't been smoking for the last couple weeks. Still trying to get a new apartment.      Exposure History   Current apartment in a building with poor air quality, dust, significant secondhand smoke.     Oncology history taken from Oncology notes and chart review:  11/2015 - SOB, CT with medial RLL nodule  12/2015 - PET scan - Hypermetabolic RLL, no mets  1/2016 - Biopsy favoring adenocarcinoma  2/2016 - RLL lobectomy, pathology confirmed poorly differentiated adeno  10/2019 - Admission for acute respiratory failure 2/2 COPD exacerbation with new RUL nodule on CT  11/2019 PET - Hypermetabolic RUL nodule, no mets  12/2019 - Not surgical/biopsy candidate given emphysema, poor lung function  1/14/2020 - 1/28/2020 Empiric SBRT  11/2021 thorough 3/2022 - new nodules on sequential CTs (RUL, ROBERT)    Objective:   Medications:  Current Outpatient Medications   Medication     albuterol (PROAIR HFA/PROVENTIL HFA/VENTOLIN HFA) 108 (90 Base) MCG/ACT inhaler     alcohol swab prep pads     Alpha-Lipoic Acid 300 MG CAPS     amLODIPine (NORVASC) 10 MG tablet     aspirin 81 MG EC tablet     benzonatate (TESSALON) 200 MG capsule     cetirizine (ZYRTEC) 10 MG tablet     coenzyme Q-10 (CO-Q10) 50 MG capsule     Continuous Blood Gluc Sensor (FREESTYLE GIOVANNI 14 DAY SENSOR) MISC     cyclobenzaprine (FLEXERIL) 5 MG tablet     doxycycline hyclate (VIBRAMYCIN) 100 MG capsule     fluticasone-vilanterol (BREO ELLIPTA) 200-25 MCG/INH inhaler     glipiZIDE (GLUCOTROL) 5 MG tablet      "glucosamine-chondroitin 500-400 MG tablet     ipratropium - albuterol 0.5 mg/2.5 mg/3 mL (DUONEB) 0.5-2.5 (3) MG/3ML neb solution     multivitamin w/minerals (THERA-VIT-M) tablet     nicotine (SM NICOTINE) 14 MG/24HR 24 hr patch     olopatadine (PATADAY) 0.2 % ophthalmic solution     Omega-3-Acid Eth Est, Dietary, 1 g CAPS     polyethylene glycol-propylene glycol (SYSTANE) 0.4-0.3 % SOLN ophthalmic solution     predniSONE (DELTASONE) 20 MG tablet     sodium chloride (NEBUSAL) 3 % neb solution     umeclidinium (INCRUSE ELLIPTA) 62.5 MCG/INH inhaler     No current facility-administered medications for this visit.     Physical Exam:     /67 (BP Location: Right arm, Patient Position: Chair, Cuff Size: Adult Large)   Pulse 95   Ht 1.676 m (5' 6\")   Wt 95.7 kg (211 lb)   SpO2 97%   BMI 34.06 kg/m      General: Sitting up, NAD  HEENT: anicteric  Neck: no palpable lymphadenopathy  Chest: Non-labored breathing and speech on baseline oxygen, diffuse expiratory wheezing, no crackles, no cough  Cardiac: RRR no murmurs  Abdomen: Soft, active BS  Extremities: No LE Edema  Neuro: A&Ox3, no focal deficits   Skin: no rash noted on limited exam  Psych: Appropriate    Labs and imaging: All laboratory and imaging data personally reviewed.      CT CHEST/ABDOMEN/PELVIS WITH CONTRAST May 4, 2022 1:54 PM     CLINICAL HISTORY: Restage non-small cell lung cancer, new right lower  lobe nodule in Jan 2022 slightly larger in 3/2022, new left upper lobe  nodule. Adenocarcinoma of right lung (H).     TECHNIQUE: CT scan of the chest, abdomen, and pelvis was performed  following injection of IV contrast. Multiplanar reformats were  obtained. Dose reduction techniques were used.   CONTRAST: Isovue 370 130cc.     COMPARISON: CT chest 3/31/2022, CT chest, abdomen and pelvis 4/2/2021.     FINDINGS:   LUNGS AND PLEURA: Diffuse emphysema. No effusions. Stable cavitary 0.7  cm right apex nodule series 6 image 26. Posterior right upper " lobe  subpleural nodule opacity stable measuring 0.6 cm series 6 image 88  with adjacent ill-defined opacities posteriorly. Stable solid  posterior right upper lobe 0.9 cm nodule image 116. Stable irregular  solid 0.5 cm left upper lobe midchest nodule series 6 image 115.  However, there are new patchy areas of irregular nodular opacity newly  seen. For example just inferior to the aforementioned nodule is a 0.9  cm additional nodule image 119.     MEDIASTINUM/AXILLAE: Several small mediastinal lymph nodes again  noted. One of these at the AP window continues to be 1 cm series 3  image 97. No convincing new adenopathy. No acute mediastinal  abnormality.     CORONARY ARTERY CALCIFICATION: Mild.     HEPATOBILIARY: No new liver lesion. Gallbladder unremarkable.     PANCREAS: Normal.     SPLEEN: Normal.     ADRENAL GLANDS: Stable mild bilateral adrenal thickening.     KIDNEYS/BLADDER: No new significant renal or bladder abnormality. No  hydronephrosis.     BOWEL: No acute abnormality. Normal appendix.     PELVIC ORGANS: Normal.     ADDITIONAL FINDINGS: Scattered vascular calcifications. No new  adenopathy is seen within the abdomen or pelvis.     MUSCULOSKELETAL: No aggressive bone lesion identified.                                                                      IMPRESSION:  1.  Several bilateral pulmonary nodules again identified. A nodule at  the left upper lobe midchest is stable in size. However, new finding  of patchy irregular adjacent nodules and ill-defined opacities within  this region noted. This is indeterminate. Progression of disease at  this location is not excluded, though this could also relate to an  infectious or inflammatory cause such as pneumonitis. The other  several nodules appear to be stable.  2.  Remainder of the scan is stable with no new disease identified.     HARMONY HOPE MD     Attestation signed by Perlman, David Morris, MD at 5/19/2022 11:21 PM:  Attending Note:    The patient was  seen examined by me with the pulmonary fellow, I have personally reviewed the imaging studies, lab and culture results.  The note reflects our joint findings, assessment and plan.      David Perlman, M.D.  Pulmonary and Critical Care.

## 2022-05-09 ENCOUNTER — TELEPHONE (OUTPATIENT)
Dept: INTERNAL MEDICINE | Facility: CLINIC | Age: 67
End: 2022-05-09
Payer: COMMERCIAL

## 2022-05-09 RX ORDER — FLUTICASONE PROPIONATE 50 MCG
1 SPRAY, SUSPENSION (ML) NASAL DAILY
Qty: 15.8 ML | Refills: 1 | Status: SHIPPED | OUTPATIENT
Start: 2022-05-09 | End: 2022-08-22

## 2022-05-09 NOTE — TELEPHONE ENCOUNTER
"  [facility] Call Center    Phone Message    May a detailed message be left on voicemail: yes     Reason for Call: Other: Pt requesting call back to discuss her medications, pt stated she has not had her medications in a long time, writer asked the pt which medications, and pt just stated \"all of them\". Pt has a long list of them, pt needing to discuss in detail the ones that she should still be taking     Action Taken: Message routed to:  [facility] ([facility] pcc                                           "

## 2022-05-10 NOTE — PATIENT INSTRUCTIONS
Albuterol nebulizer today in clinic to help with your breathing and wheezing.    Start taking your prednisone and antibiotics for a COPD exacerbation.     your new Trelegy inhaler and start using daily in place of Incruse and Breo.    We will help you set up Sleep and Pulmonary Rehab appointments as well as a six minute walk test in the near future    Return to clinic in 3 months

## 2022-05-10 NOTE — TELEPHONE ENCOUNTER
Called patient letting he know that she would need to call her [facility] pharmacy to have all her prescriptions transferred over from the [place] mail order as she states that her insurance doesn't cover their mail order pharmacy. She declined to call [facility] and noted she may just go to the hospital to get her medications because she feels sick and stated that I would have to call [facility] for her.     I called [facility] and they will have all her medications transferred to them and fill them for her.    Samy Prasad, Pharm. D., [facility]  Medication Therapy Management Pharmacist  Direct Voicemail: [phone]

## 2022-05-13 ENCOUNTER — TELEPHONE (OUTPATIENT)
Dept: ONCOLOGY | Facility: CLINIC | Age: 67
End: 2022-05-13
Payer: COMMERCIAL

## 2022-05-13 DIAGNOSIS — C34.91 ADENOCARCINOMA OF RIGHT LUNG (H): Primary | ICD-10-CM

## 2022-05-13 NOTE — TELEPHONE ENCOUNTER
Called Jenn to follow up on CT CAP done 5/4 as follow up for two small new nodules on the March CT. Those nodules are stable but there's a new GGO in the ROBERT near the fissure with a small solid nodule embedded in it. Indeterminate. Left VM as she didn't answer. Would recommend CT chest non contrast in 6-8 weeks to follow up. I suspect mostly inflammatory.     If larger, will get PET/CT to start thereafter.     Zarina Leung MD

## 2022-05-20 ENCOUNTER — APPOINTMENT (OUTPATIENT)
Dept: CT IMAGING | Facility: CLINIC | Age: 67
DRG: 190 | End: 2022-05-20
Attending: STUDENT IN AN ORGANIZED HEALTH CARE EDUCATION/TRAINING PROGRAM
Payer: COMMERCIAL

## 2022-05-20 ENCOUNTER — HOSPITAL ENCOUNTER (INPATIENT)
Facility: CLINIC | Age: 67
LOS: 4 days | Discharge: HOME | DRG: 190 | End: 2022-05-24
Attending: STUDENT IN AN ORGANIZED HEALTH CARE EDUCATION/TRAINING PROGRAM | Admitting: STUDENT IN AN ORGANIZED HEALTH CARE EDUCATION/TRAINING PROGRAM
Payer: COMMERCIAL

## 2022-05-20 ENCOUNTER — APPOINTMENT (OUTPATIENT)
Dept: GENERAL RADIOLOGY | Facility: CLINIC | Age: 67
DRG: 190 | End: 2022-05-20
Attending: STUDENT IN AN ORGANIZED HEALTH CARE EDUCATION/TRAINING PROGRAM
Payer: COMMERCIAL

## 2022-05-20 ENCOUNTER — TELEPHONE (OUTPATIENT)
Dept: PULMONOLOGY | Facility: CLINIC | Age: 67
End: 2022-05-20
Payer: COMMERCIAL

## 2022-05-20 DIAGNOSIS — E46 MALNUTRITION, UNSPECIFIED TYPE (H): ICD-10-CM

## 2022-05-20 DIAGNOSIS — J44.9 CHRONIC OBSTRUCTIVE PULMONARY DISEASE, UNSPECIFIED COPD TYPE (H): ICD-10-CM

## 2022-05-20 DIAGNOSIS — N95.0 POSTMENOPAUSAL BLEEDING: ICD-10-CM

## 2022-05-20 DIAGNOSIS — J43.9 PULMONARY EMPHYSEMA, UNSPECIFIED EMPHYSEMA TYPE (H): ICD-10-CM

## 2022-05-20 DIAGNOSIS — E87.6 HYPOKALEMIA: ICD-10-CM

## 2022-05-20 DIAGNOSIS — R07.9 CHEST PAIN, UNSPECIFIED TYPE: ICD-10-CM

## 2022-05-20 DIAGNOSIS — C34.90 PRIMARY ADENOCARCINOMA OF LUNG, UNSPECIFIED LATERALITY (H): ICD-10-CM

## 2022-05-20 DIAGNOSIS — R06.02 SHORTNESS OF BREATH: Primary | ICD-10-CM

## 2022-05-20 DIAGNOSIS — Z11.52 ENCOUNTER FOR SCREENING LABORATORY TESTING FOR SEVERE ACUTE RESPIRATORY SYNDROME CORONAVIRUS 2 (SARS-COV-2): ICD-10-CM

## 2022-05-20 DIAGNOSIS — K21.9 GASTROESOPHAGEAL REFLUX DISEASE WITHOUT ESOPHAGITIS: ICD-10-CM

## 2022-05-20 DIAGNOSIS — E11.9 TYPE 2 DIABETES MELLITUS WITHOUT COMPLICATION, WITHOUT LONG-TERM CURRENT USE OF INSULIN (H): ICD-10-CM

## 2022-05-20 DIAGNOSIS — N88.2 CERVICAL STENOSIS (UTERINE CERVIX): ICD-10-CM

## 2022-05-20 DIAGNOSIS — R09.02 HYPOXIA: ICD-10-CM

## 2022-05-20 DIAGNOSIS — R07.89 OTHER CHEST PAIN: ICD-10-CM

## 2022-05-20 DIAGNOSIS — R13.19 ESOPHAGEAL DYSPHAGIA: ICD-10-CM

## 2022-05-20 LAB
ALBUMIN SERPL-MCNC: 3.6 G/DL (ref 3.4–5)
ALP SERPL-CCNC: 98 U/L (ref 40–150)
ALT SERPL W P-5'-P-CCNC: 18 U/L (ref 0–50)
ANION GAP SERPL CALCULATED.3IONS-SCNC: 7 MMOL/L (ref 3–14)
AST SERPL W P-5'-P-CCNC: 8 U/L (ref 0–45)
BASOPHILS # BLD AUTO: 0.1 10E3/UL (ref 0–0.2)
BASOPHILS NFR BLD AUTO: 1 %
BILIRUB SERPL-MCNC: 0.3 MG/DL (ref 0.2–1.3)
BUN SERPL-MCNC: 7 MG/DL (ref 7–30)
CALCIUM SERPL-MCNC: 9.4 MG/DL (ref 8.5–10.1)
CHLORIDE BLD-SCNC: 101 MMOL/L (ref 94–109)
CO2 SERPL-SCNC: 31 MMOL/L (ref 20–32)
CREAT SERPL-MCNC: 0.46 MG/DL (ref 0.52–1.04)
D DIMER PPP FEU-MCNC: <0.27 UG/ML FEU (ref 0–0.5)
EOSINOPHIL # BLD AUTO: 0.3 10E3/UL (ref 0–0.7)
EOSINOPHIL NFR BLD AUTO: 3 %
ERYTHROCYTE [DISTWIDTH] IN BLOOD BY AUTOMATED COUNT: 12.9 % (ref 10–15)
FLUAV RNA SPEC QL NAA+PROBE: NEGATIVE
FLUBV RNA RESP QL NAA+PROBE: NEGATIVE
GFR SERPL CREATININE-BSD FRML MDRD: >90 ML/MIN/1.73M2
GLUCOSE BLD-MCNC: 245 MG/DL (ref 70–99)
HCT VFR BLD AUTO: 44.7 % (ref 35–47)
HGB BLD-MCNC: 14 G/DL (ref 11.7–15.7)
HOLD SPECIMEN: NORMAL
IMM GRANULOCYTES # BLD: 0 10E3/UL
IMM GRANULOCYTES NFR BLD: 0 %
LYMPHOCYTES # BLD AUTO: 2.7 10E3/UL (ref 0.8–5.3)
LYMPHOCYTES NFR BLD AUTO: 31 %
MCH RBC QN AUTO: 28.1 PG (ref 26.5–33)
MCHC RBC AUTO-ENTMCNC: 31.3 G/DL (ref 31.5–36.5)
MCV RBC AUTO: 90 FL (ref 78–100)
MONOCYTES # BLD AUTO: 0.7 10E3/UL (ref 0–1.3)
MONOCYTES NFR BLD AUTO: 8 %
NEUTROPHILS # BLD AUTO: 5 10E3/UL (ref 1.6–8.3)
NEUTROPHILS NFR BLD AUTO: 57 %
NRBC # BLD AUTO: 0 10E3/UL
NRBC BLD AUTO-RTO: 0 /100
PLATELET # BLD AUTO: 282 10E3/UL (ref 150–450)
POTASSIUM BLD-SCNC: 3.2 MMOL/L (ref 3.4–5.3)
PROT SERPL-MCNC: 7.7 G/DL (ref 6.8–8.8)
RBC # BLD AUTO: 4.98 10E6/UL (ref 3.8–5.2)
RSV RNA SPEC NAA+PROBE: NEGATIVE
SARS-COV-2 RNA RESP QL NAA+PROBE: NEGATIVE
SODIUM SERPL-SCNC: 139 MMOL/L (ref 133–144)
TROPONIN I SERPL HS-MCNC: 6 NG/L
WBC # BLD AUTO: 8.7 10E3/UL (ref 4–11)

## 2022-05-20 PROCEDURE — 93010 ELECTROCARDIOGRAM REPORT: CPT | Mod: 59 | Performed by: STUDENT IN AN ORGANIZED HEALTH CARE EDUCATION/TRAINING PROGRAM

## 2022-05-20 PROCEDURE — 250N000011 HC RX IP 250 OP 636: Performed by: STUDENT IN AN ORGANIZED HEALTH CARE EDUCATION/TRAINING PROGRAM

## 2022-05-20 PROCEDURE — 85379 FIBRIN DEGRADATION QUANT: CPT | Performed by: STUDENT IN AN ORGANIZED HEALTH CARE EDUCATION/TRAINING PROGRAM

## 2022-05-20 PROCEDURE — 71046 X-RAY EXAM CHEST 2 VIEWS: CPT

## 2022-05-20 PROCEDURE — 93005 ELECTROCARDIOGRAM TRACING: CPT

## 2022-05-20 PROCEDURE — 96375 TX/PRO/DX INJ NEW DRUG ADDON: CPT

## 2022-05-20 PROCEDURE — 94640 AIRWAY INHALATION TREATMENT: CPT

## 2022-05-20 PROCEDURE — 93308 TTE F-UP OR LMTD: CPT

## 2022-05-20 PROCEDURE — 93308 TTE F-UP OR LMTD: CPT | Mod: 26 | Performed by: STUDENT IN AN ORGANIZED HEALTH CARE EDUCATION/TRAINING PROGRAM

## 2022-05-20 PROCEDURE — 206N000001 HC R&B BMT UMMC

## 2022-05-20 PROCEDURE — 84100 ASSAY OF PHOSPHORUS: CPT | Performed by: STUDENT IN AN ORGANIZED HEALTH CARE EDUCATION/TRAINING PROGRAM

## 2022-05-20 PROCEDURE — 71275 CT ANGIOGRAPHY CHEST: CPT

## 2022-05-20 PROCEDURE — 250N000009 HC RX 250: Performed by: STUDENT IN AN ORGANIZED HEALTH CARE EDUCATION/TRAINING PROGRAM

## 2022-05-20 PROCEDURE — 84484 ASSAY OF TROPONIN QUANT: CPT | Performed by: STUDENT IN AN ORGANIZED HEALTH CARE EDUCATION/TRAINING PROGRAM

## 2022-05-20 PROCEDURE — 87637 SARSCOV2&INF A&B&RSV AMP PRB: CPT | Performed by: STUDENT IN AN ORGANIZED HEALTH CARE EDUCATION/TRAINING PROGRAM

## 2022-05-20 PROCEDURE — C9803 HOPD COVID-19 SPEC COLLECT: HCPCS

## 2022-05-20 PROCEDURE — 99285 EMERGENCY DEPT VISIT HI MDM: CPT | Mod: CS | Performed by: STUDENT IN AN ORGANIZED HEALTH CARE EDUCATION/TRAINING PROGRAM

## 2022-05-20 PROCEDURE — 71046 X-RAY EXAM CHEST 2 VIEWS: CPT | Mod: 26 | Performed by: RADIOLOGY

## 2022-05-20 PROCEDURE — 85025 COMPLETE CBC W/AUTO DIFF WBC: CPT | Performed by: STUDENT IN AN ORGANIZED HEALTH CARE EDUCATION/TRAINING PROGRAM

## 2022-05-20 PROCEDURE — 99285 EMERGENCY DEPT VISIT HI MDM: CPT | Mod: CS,25

## 2022-05-20 PROCEDURE — 96365 THER/PROPH/DIAG IV INF INIT: CPT

## 2022-05-20 PROCEDURE — 83036 HEMOGLOBIN GLYCOSYLATED A1C: CPT | Performed by: STUDENT IN AN ORGANIZED HEALTH CARE EDUCATION/TRAINING PROGRAM

## 2022-05-20 PROCEDURE — 36415 COLL VENOUS BLD VENIPUNCTURE: CPT | Performed by: STUDENT IN AN ORGANIZED HEALTH CARE EDUCATION/TRAINING PROGRAM

## 2022-05-20 PROCEDURE — 83735 ASSAY OF MAGNESIUM: CPT | Performed by: STUDENT IN AN ORGANIZED HEALTH CARE EDUCATION/TRAINING PROGRAM

## 2022-05-20 PROCEDURE — 80053 COMPREHEN METABOLIC PANEL: CPT | Performed by: STUDENT IN AN ORGANIZED HEALTH CARE EDUCATION/TRAINING PROGRAM

## 2022-05-20 RX ORDER — ONDANSETRON 2 MG/ML
4 INJECTION INTRAMUSCULAR; INTRAVENOUS ONCE
Status: DISCONTINUED | OUTPATIENT
Start: 2022-05-20 | End: 2022-05-24 | Stop reason: HOSPADM

## 2022-05-20 RX ORDER — IPRATROPIUM BROMIDE AND ALBUTEROL SULFATE 2.5; .5 MG/3ML; MG/3ML
3 SOLUTION RESPIRATORY (INHALATION) EVERY 4 HOURS PRN
Status: DISCONTINUED | OUTPATIENT
Start: 2022-05-20 | End: 2022-05-21

## 2022-05-20 RX ORDER — MAGNESIUM SULFATE HEPTAHYDRATE 500 MG/ML
2 INJECTION, SOLUTION INTRAMUSCULAR; INTRAVENOUS ONCE
Status: DISCONTINUED | OUTPATIENT
Start: 2022-05-20 | End: 2022-05-20

## 2022-05-20 RX ORDER — MORPHINE SULFATE 4 MG/ML
4 INJECTION, SOLUTION INTRAMUSCULAR; INTRAVENOUS ONCE
Status: DISCONTINUED | OUTPATIENT
Start: 2022-05-20 | End: 2022-05-21

## 2022-05-20 RX ORDER — MAGNESIUM SULFATE HEPTAHYDRATE 40 MG/ML
2 INJECTION, SOLUTION INTRAVENOUS ONCE
Status: COMPLETED | OUTPATIENT
Start: 2022-05-20 | End: 2022-05-20

## 2022-05-20 RX ORDER — METHYLPREDNISOLONE SODIUM SUCCINATE 125 MG/2ML
125 INJECTION, POWDER, LYOPHILIZED, FOR SOLUTION INTRAMUSCULAR; INTRAVENOUS ONCE
Status: COMPLETED | OUTPATIENT
Start: 2022-05-20 | End: 2022-05-20

## 2022-05-20 RX ORDER — IOPAMIDOL 755 MG/ML
75 INJECTION, SOLUTION INTRAVASCULAR ONCE
Status: COMPLETED | OUTPATIENT
Start: 2022-05-20 | End: 2022-05-20

## 2022-05-20 RX ADMIN — IOPAMIDOL 75 ML: 755 INJECTION, SOLUTION INTRAVENOUS at 20:52

## 2022-05-20 RX ADMIN — IPRATROPIUM BROMIDE AND ALBUTEROL SULFATE 3 ML: 2.5; .5 SOLUTION RESPIRATORY (INHALATION) at 18:29

## 2022-05-20 RX ADMIN — MAGNESIUM SULFATE IN WATER 2 G: 40 INJECTION, SOLUTION INTRAVENOUS at 20:02

## 2022-05-20 RX ADMIN — METHYLPREDNISOLONE SODIUM SUCCINATE 125 MG: 125 INJECTION, POWDER, FOR SOLUTION INTRAMUSCULAR; INTRAVENOUS at 18:42

## 2022-05-20 ASSESSMENT — ENCOUNTER SYMPTOMS
FEVER: 0
SHORTNESS OF BREATH: 1

## 2022-05-20 ASSESSMENT — ACTIVITIES OF DAILY LIVING (ADL): ADLS_ACUITY_SCORE: 35
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
## 2022-05-20 NOTE — ED PROVIDER NOTES
[facility] ([facility])  5/20/22  History     Chief Complaint   Patient presents with     Shortness of Breath     Chest Pain     The history is provided by the patient and medical records.     Jenn Aparicio is a 66 year old female with a history notable for severe COPD (FEV1 24%) on 2 L O2, RLL IA2 [id number] adenocarcinoma s/p lobectomy (2/2016), and suspected [id number] IA2 NSCLC s/p SBRT (01/2020), HTN, DM 2 and tobacco use who presents to the ED via EMS for evaluation of shortness of breath.  Patient notes having shortness of breath at baseline but states over the past 4 days her symptoms have worsened.  Patient notes her bathroom was painted several days ago and she feels like this contributed to her symptoms. She endorses midsternal chest pain as well as right flank pain.  She notes this pain feels different than her typical discomfort.  The patient developed a cough a couple weeks ago for which she was prescribed steroids and antibiotics.  She subsequently developed a rash all over her body including her arms and legs.  Patient denies noticing worsening lower extremity swelling.  Patient denies experiencing fevers.  Per EMS, patient was given 2 rounds of nitroglycerin spray with little relief along with 324 mg aspirin, 1 DuoNeb and 1 albuterol nebulizer.    I have reviewed the Medications, Allergies, Past Medical and Surgical History, and Social History in the [facility].  PAST MEDICAL HISTORY:   Past Medical History:   Diagnosis Date     Adenocarcinoma, lung (H)      Asthma      Ectopic pregnancy      Pulmonary emphysema (H)     Very severe FEV1<30% predicted     Type II diabetes mellitus (H)        PAST SURGICAL HISTORY:   Past Surgical History:   Procedure Laterality Date     2/8/16                R thoracotomy, RLL lobectomy (Dr. Cunha). Adenocarcinoma, 1.1 cm, assoicated with atypical adenomatous hyperplasia Right 02/08/2016    R thoracotomy, RLL lobectomy (Dr. Cunha).  Adenocarcinoma, 1.1 cm, assoicated with atypical adenomatous hyperplasia       Past medical history, past surgical history, medications, and allergies were reviewed with the patient. Additional pertinent items: None    FAMILY HISTORY:   Family History   Problem Relation Age of Onset     Depression Daughter      Alcohol/Drug Other         self     Diabetes Other         self     Thyroid Disease Other         self     Asthma Other         self       SOCIAL HISTORY:   Social History     Tobacco Use     Smoking status: Current Every Day Smoker     Packs/day: 0.25     Types: Cigarettes     Smokeless tobacco: Former User     Tobacco comment: 2/24/21 Patient has quit as of 2/12/21. Patient will be considered former smoker if she remains smoke-free for 1 month   Substance Use Topics     Alcohol use: Yes     Comment: sometime     Social history was reviewed with the patient. Additional pertinent items: None      Patient's Medications   New Prescriptions    No medications on file   Previous Medications    ALBUTEROL (PROAIR HFA/PROVENTIL HFA/VENTOLIN HFA) 108 (90 BASE) MCG/ACT INHALER    INHALE 1 OR 2 PUFFS BY MOUTH EVERY 4 HOURS AS NEEDED    ALCOHOL SWAB PREP PADS    Use to swab area of injection/xander as directed.    ALPHA-LIPOIC ACID 300 MG CAPS    Take 300 mg by mouth daily    AMLODIPINE (NORVASC) 10 MG TABLET    Take 1 tablet (10 mg) by mouth daily    ASPIRIN 81 MG EC TABLET    Take 1 tablet (81 mg) by mouth daily    BENZONATATE (TESSALON) 200 MG CAPSULE    Take 1 capsule (200 mg) by mouth 3 times daily as needed for cough    CETIRIZINE (ZYRTEC) 10 MG TABLET    Take 1 tablet (10 mg) by mouth daily    COENZYME Q-10 (CO-Q10) 50 MG CAPSULE    Take 1 capsule (50 mg) by mouth daily    CONTINUOUS BLOOD GLUC SENSOR (FREESTYLE GIOVANNI 14 DAY SENSOR) MISC    Change every 14 days.    CYCLOBENZAPRINE (FLEXERIL) 5 MG TABLET    Take 1 tablet (5 mg) by mouth 3 times daily as needed for muscle spasms    DOXYCYCLINE HYCLATE (VIBRAMYCIN)  100 MG CAPSULE    Take 100 mg twice daily for five days if needed for treatment of a COPD exacerbation. Use alongside prednisone.    EASY COMFORT LANCETS MISC        FLUTICASONE (FLONASE) 50 MCG/ACT NASAL SPRAY    Spray 1 spray into both nostrils daily Use at night before bed    FLUTICASONE-UMECLIDIN-VILANTEROL (TRELEGY ELLIPTA) 200-62.5-25 MCG/INH ORAL INHALER    Inhale 1 puff into the lungs daily    FLUTICASONE-VILANTEROL (BREO ELLIPTA) 200-25 MCG/INH INHALER    Inhale 1 puff into the lungs daily    GLIPIZIDE (GLUCOTROL) 5 MG TABLET    Take 1 tablet (5 mg) by mouth 2 times daily (before meals)    GLUCOSAMINE-CHONDROITIN 500-400 MG TABLET    Take 1 tablet by mouth daily    IPRATROPIUM - ALBUTEROL 0.5 MG/2.5 MG/3 ML (DUONEB) 0.5-2.5 (3) MG/3ML NEB SOLUTION    Take 1 vial (3 mLs) by nebulization every 6 hours as needed for shortness of breath / dyspnea or wheezing    KETOTIFEN (ZADITOR) 0.025 % OPHTHALMIC SOLUTION    INSTILL 1 DROP IN BOTH EYES TWICE DAILY AS NEEDED    LANCET DEVICES (EASY MINI EJECT LANCING DEVICE) MISC        MULTIVITAMIN W/MINERALS (THERA-VIT-M) TABLET    Take 1 tablet by mouth daily    NICOTINE (SM NICOTINE) 14 MG/24HR 24 HR PATCH    Place 1 patch onto the skin every 24 hours    OLOPATADINE (PATADAY) 0.2 % OPHTHALMIC SOLUTION    Place 1 drop into both eyes daily    OMEGA-3 ACID ETHYL ESTERS (LOVAZA) 1 G CAPSULE        OMEGA-3-ACID ETH EST, DIETARY, 1 G CAPS    Take 2 capfuls by mouth 2 times daily    POLYETHYLENE GLYCOL-PROPYLENE GLYCOL (SYSTANE) 0.4-0.3 % SOLN OPHTHALMIC SOLUTION    Place 1 drop into both eyes 4 times daily    PREDNISONE (DELTASONE) 20 MG TABLET    Take 40 mg daily for 5 days if needed for treatment of a COPD exacerbation, use alongside doxycycline    SODIUM CHLORIDE (NEBUSAL) 3 % NEB SOLUTION    Take 3 mLs by nebulization daily Use 1-2 times daily in combination with Duoneb and Aerobika to help with mucus clearance    UMECLIDINIUM (INCRUSE ELLIPTA) 62.5 MCG/INH INHALER     Inhale 1 puff into the lungs daily   Modified Medications    No medications on file   Discontinued Medications    No medications on file          Allergies   Allergen Reactions     Interferons Dermatitis     Penicillins Hives     Aspirin      325mg      Colon Care         Review of Systems   Constitutional: Negative for fever.   Respiratory: Positive for shortness of breath.    Cardiovascular: Positive for chest pain. Negative for leg swelling.   Skin: Positive for rash.   All other systems reviewed and are negative.    A complete review of systems was performed with pertinent positives and negatives noted in the HPI, and all other systems negative.    Physical Exam   BP: 135/79  Pulse: (!) 126  Resp: 20  Weight: 95.7 kg (210 lb 15.7 oz)  SpO2: 97 %      Physical Exam  Vitals and nursing note reviewed.   Constitutional:       General: She is not in acute distress.     Appearance: She is not ill-appearing, toxic-appearing or diaphoretic.   HENT:      Head: Normocephalic and atraumatic.      Right Ear: External ear normal.      Left Ear: External ear normal.      Nose: Nose normal.      Mouth/Throat:      Pharynx: No oropharyngeal exudate.   Eyes:      General: No scleral icterus.     Extraocular Movements: Extraocular movements intact.      Conjunctiva/sclera: Conjunctivae normal.      Pupils: Pupils are equal, round, and reactive to light.   Cardiovascular:      Rate and Rhythm: Normal rate and regular rhythm.      Heart sounds: Normal heart sounds.   Pulmonary:      Effort: Respiratory distress present.      Breath sounds: Wheezing present.   Abdominal:      General: Bowel sounds are normal. There is no distension.      Palpations: Abdomen is soft.      Tenderness: There is no abdominal tenderness.   Musculoskeletal:         General: No tenderness, deformity or signs of injury.      Cervical back: Neck supple. No rigidity.      Right lower leg: No edema.      Left lower leg: No edema.   Skin:     General: Skin is  warm.      Findings: Rash present.   Neurological:      General: No focal deficit present.      Mental Status: She is alert. Mental status is at baseline.   Psychiatric:         Mood and Affect: Mood normal.         Behavior: Behavior normal.         ED Course   6:25 PM  The patient was seen and examined by Dr. Kimi Lewis in Room ED 18.        Procedures  Results for orders placed during the hospital encounter of 05/20/22    POC US ECHO LIMITED    [facility] Procedure Note    Limited Bedside ED Cardiac Ultrasound:    PROCEDURE: PERFORMED BY: Dr. Kimi Lewis MD  INDICATIONS/SYMPTOM:  Chest Pain and Shortness of Breath  PROBE: Cardiac phased array probe  BODY LOCATION: Chest  FINDINGS:  The ultrasound was performed utilizing the subcostal and parasternal long axis views.  Cardiac contractility:  Present  Gross estimation of cardiac kinesis: normal  Pericardial Effusion:  None  RV:LV ratio: Equal  IVC:  Diameter:  IVC diameter expiration (IVCe) <2 cm  IVC diameter inspiration (IVCi)  <2 cm  Collapsibility:  IVC collapses < 50% with inspiration  INTERPRETATION:    EF grossly preserved, RV and LV equal in size,  No pericardial effusion was found.  IVC visualized and findings indicate normovolemia.  IMAGE DOCUMENTATION: Images were archived to PACs system.            EKG Interpretation:      Interpreted by Kimi Lewis MD  Time reviewed: 1830  Symptoms at time of EKG: Chest Pain, SOB   Rhythm: sinus tachycardia  Rate: Tachycardia  Axis: Normal  Ectopy: none  Conduction: normal  ST Segments/ T Waves: Non-specific ST-T wave changes  Q Waves: none  Comparison to prior: Unchanged from 2/23/21    Clinical Impression: no acute changes                The medical record was reviewed and interpreted.  Current labs reviewed and interpreted.  Current images reviewed and interpreted: No PE, worsening lung nodules.  EKG reviewed and interpreted: no acute ischemic changes.    Results for orders placed or  performed during the hospital encounter of 05/20/22 (from the past 24 hour(s))   POC US ECHO LIMITED    Impression    [facility] Procedure Note      Limited Bedside ED Cardiac Ultrasound:    PROCEDURE: PERFORMED BY: Dr. Kimi Lewis MD  INDICATIONS/SYMPTOM:  Chest Pain and Shortness of Breath  PROBE: Cardiac phased array probe  BODY LOCATION: Chest  FINDINGS:   The ultrasound was performed utilizing the subcostal and parasternal long axis views.  Cardiac contractility:  Present  Gross estimation of cardiac kinesis: normal  Pericardial Effusion:  None  RV:LV ratio: Equal  IVC:    Diameter:  IVC diameter expiration (IVCe) <2 cm                                                   IVC diameter inspiration (IVCi)  <2 cm                                                       Collapsibility:  IVC collapses < 50% with inspiration  INTERPRETATION:    EF grossly preserved, RV and LV equal in size,  No pericardial effusion was found.  IVC visualized and findings indicate normovolemia.  IMAGE DOCUMENTATION: Images were archived to PACs system.         EKG 12 lead   Result Value Ref Range    Systolic Blood Pressure  mmHg    Diastolic Blood Pressure  mmHg    Ventricular Rate 119 BPM    Atrial Rate 119 BPM    ND Interval 154 ms    QRS Duration 62 ms     ms    QTc 430 ms    P Axis 91 degrees    R AXIS 66 degrees    T Axis 35 degrees    Interpretation ECG       Sinus tachycardia  Biatrial enlargement  Septal infarct , age undetermined  Abnormal ECG     [place] Draw    Narrative    The following orders were created for panel order [place] Draw.  Procedure                               Abnormality         Status                     ---------                               -----------         ------                     Extra Blue Top Tube[[id number]]                              Final result               Extra Red Top Tube[[id number]]                               Final result               Extra Green Top  (Lithium...[[id number]]                      Final result               Extra Purple Top Tube[[id number]]                            Final result                 Please view results for these tests on the individual orders.   CBC with platelets differential    Narrative    The following orders were created for panel order CBC with platelets differential.  Procedure                               Abnormality         Status                     ---------                               -----------         ------                     CBC with platelets and d...[[id number]]  Abnormal            Final result                 Please view results for these tests on the individual orders.   Comprehensive metabolic panel   Result Value Ref Range    Sodium 139 133 - 144 mmol/L    Potassium 3.2 (L) 3.4 - 5.3 mmol/L    Chloride 101 94 - 109 mmol/L    Carbon Dioxide (CO2) 31 20 - 32 mmol/L    Anion Gap 7 3 - 14 mmol/L    Urea Nitrogen 7 7 - 30 mg/dL    Creatinine 0.46 (L) 0.52 - 1.04 mg/dL    Calcium 9.4 8.5 - 10.1 mg/dL    Glucose 245 (H) 70 - 99 mg/dL    Alkaline Phosphatase 98 40 - 150 U/L    AST 8 0 - 45 U/L    ALT 18 0 - 50 U/L    Protein Total 7.7 6.8 - 8.8 g/dL    Albumin 3.6 3.4 - 5.0 g/dL    Bilirubin Total 0.3 0.2 - 1.3 mg/dL    GFR Estimate >90 >60 mL/min/1.73m2   Troponin I   Result Value Ref Range    Troponin I High Sensitivity 6 <54 ng/L   D dimer quantitative   Result Value Ref Range    D-Dimer Quantitative <0.27 0.00 - 0.50 ug/mL FEU    Narrative    This D-dimer assay is intended for use in conjunction with a clinical pretest probability assessment model to exclude pulmonary embolism (PE) and deep venous thrombosis (DVT) in outpatients suspected of PE or DVT. The cut-off value is 0.50 ug/mL FEU.   Extra Blue Top Tube   Result Value Ref Range    Hold Specimen JIC    Extra Red Top Tube   Result Value Ref Range    Hold Specimen JIC    Extra Green Top (Lithium Heparin) Tube   Result Value Ref Range    Hold Specimen JIC     Extra Purple Top Tube   Result Value Ref Range    Hold Specimen JIC    CBC with platelets and differential   Result Value Ref Range    WBC Count 8.7 4.0 - 11.0 10e3/uL    RBC Count 4.98 3.80 - 5.20 10e6/uL    Hemoglobin 14.0 11.7 - 15.7 g/dL    Hematocrit 44.7 35.0 - 47.0 %    MCV 90 78 - 100 fL    MCH 28.1 26.5 - 33.0 pg    MCHC 31.3 (L) 31.5 - 36.5 g/dL    RDW 12.9 10.0 - 15.0 %    Platelet Count 282 150 - 450 10e3/uL    % Neutrophils 57 %    % Lymphocytes 31 %    % Monocytes 8 %    % Eosinophils 3 %    % Basophils 1 %    % Immature Granulocytes 0 %    NRBCs per 100 WBC 0 <1 /100    Absolute Neutrophils 5.0 1.6 - 8.3 10e3/uL    Absolute Lymphocytes 2.7 0.8 - 5.3 10e3/uL    Absolute Monocytes 0.7 0.0 - 1.3 10e3/uL    Absolute Eosinophils 0.3 0.0 - 0.7 10e3/uL    Absolute Basophils 0.1 0.0 - 0.2 10e3/uL    Absolute Immature Granulocytes 0.0 <=0.4 10e3/uL    Absolute NRBCs 0.0 10e3/uL   Chest XR,  PA & LAT    Narrative    EXAMINATION: XR CHEST 2 VW, 5/20/2022 7:25 PM    INDICATION: shortness of breath    COMPARISON: 5/4/2022    FINDINGS: PA and lateral upright views of the chest.     Surgical clips within the mediastinum. Trachea is midline. The  cardiomediastinal silhouette is within normal limits. Unchanged  blunting of the right costophrenic angle. No pneumothorax.  Emphysematous change. Persistent reticular nodular opacities in the  left midlung and reticular opacities in the peripheral right upper  lobe. No new focal airspace opacity.    The partially visualized upper abdomen is unremarkable. No acute  osseous abnormality. Demineralized appearance. Soft tissue is within  normal limits.      Impression    IMPRESSION:  1. Persistent reticular nodular opacities in the left midlung and  reticular opacities in the peripheral right upper lobe, better seen on  comparison CT. No new focal airspace opacity.  2. Unchanged blunting of the right costophrenic angle corresponding to  pleural thickening on comparison CT.          I have personally reviewed the examination and initial interpretation  and I agree with the findings.    LUIS ANTONIO MEDEL DO         SYSTEM ID:  [id number]   Symptomatic; Unknown Influenza A/B & SARS-CoV2 (COVID-19) Virus PCR Multiplex Nasopharyngeal    Specimen: Nasopharyngeal; Swab   Result Value Ref Range    Influenza A PCR Negative Negative    Influenza B PCR Negative Negative    RSV PCR Negative Negative    SARS CoV2 PCR Negative Negative, Testing sent to reference lab. Results will be returned via unsolicited result    Narrative    Testing was performed using the Xpert Xpress CoV2/Flu/RSV Assay on the Cepheid GeneXpert Instrument. This test should be ordered for the detection of SARS-CoV-2 and influenza viruses in individuals who meet clinical and/or epidemiological criteria. Test performance is unknown in asymptomatic patients. This test is for in vitro diagnostic use under the FDA EUA for laboratories certified under CLIA to perform high or moderate complexity testing. This test has not been FDA cleared or approved. A negative result does not rule out the presence of PCR inhibitors in the specimen or target RNA in concentration below the limit of detection for the assay. If only one viral target is positive but coinfection with multiple targets is suspected, the sample should be re-tested with another FDA cleared, approved, or authorized test, if coinfection would change clinical management. This test was validated by the [facility]. These laboratories are certified under the Clinical  Laboratory Improvement Amendments of 1988 (CLIA-88) as qualified to perform high complexity laboratory testing.   CT Chest Pulmonary Embolism w Contrast    Narrative    EXAMINATION: CTA pulmonary angiogram, 5/20/2022 9:03 PM     COMPARISON: Chest x-ray 5/20/2022, CT chest abdomen pelvis 5/4/2022,  chest CT 3/31/2022    HISTORY: PE suspected, high probability    Additional history from the electronic  medical record: Patient has  history of non-small cell lung cancer. Adenocarcinoma of the right  lung.    TECHNIQUE: Volumetric helical acquisition of CT images of the chest  from the lung apices to the kidneys were acquired after the  administration of 80 mL of Isovue-370 IV contrast. Flash technique  with free breathing acquisition.  Post-processed multiplanar and/or  MIP reformations were obtained, archived to PACS and used in  interpretation of this study.     FINDINGS:  Contrast bolus is: excellent.  Exam is negative for acute  pulmonary embolism.    The largest right ventricle transaxial diameter is (measured from  endocardium to endocardium): 3.4 cm   The largest left ventricle transaxial diameter is (measured from  endocardium to endocardium): 3.2 cm  RV/LV ratio is: 1.1 (if ratio greater than 1.1 then sign is suspicious  for right heart strain)  Reflux of contrast into the IVC? no  Paradoxical bowing of the interventricular septum to the left? no  Pericardial effusion?:not present    The central tracheobronchial tree is patent. Stable solid 6 mm nodule  in the posterior subpleural right upper lobe (series 8 image 94)  surrounded by reticular opacities, 5 mm nodule in the posterior right  upper lobe (series 8 image 112), and 9 mm nodule just inferior (series  8 image 122). Increased clustered nodularity in the posterior left  upper lobe. No pleural effusion or pneumothorax. Endobronchial mucous  plugging in the left lower lobe. Apically predominant centrilobular  and paraseptal emphysema. Right lower lobectomy changes.    Normal heart size. No pericardial effusion. Normal caliber ascending  aorta and main pulmonary artery. Slightly reduced mediastinal lymph  nodes. For example 9 mm lymph node in the AP window, previously 1 cm  (series 7 image 102).    Upper abdomen: Limited. No acute pathology.    Bones: No acute or suspicious osseous lesions.      Impression    IMPRESSION:   1. Exam is negative for acute  pulmonary embolism.    2. Slightly increased (since 5/4/2022) clustered nodularity in the  posterior left upper lobe, favored to be infectious/inflammatory given  absence of any similar finding on 3/31/2022, though malignancy remains  a consideration. Stable right upper lobe pulmonary nodules. Recommend  attention on short-term follow-up.    In the event of a positive result for acute pulmonary embolism  resulting in right heart strain, please activate the [facility]  Multidisciplinary group for consultation by paging [phone]  [phone]).     [facility] -- Pulmonary Embolism Response Team (Multidisciplinary team  including cardiology, interventional radiology, critical care,  hematology)    I have personally reviewed the examination and initial interpretation  and I agree with the findings.    LUIS ANTONIO MEDEL,          SYSTEM ID:  [id number]     Medications   ipratropium - albuterol 0.5 mg/2.5 mg/3 mL (DUONEB) neb solution 3 mL (3 mLs Nebulization Given 5/20/22 1829)   morphine (PF) injection 4 mg (has no administration in time range)   ondansetron (ZOFRAN) injection 4 mg (has no administration in time range)   methylPREDNISolone sodium succinate (solu-MEDROL) injection 125 mg (125 mg Intravenous Given 5/20/22 1842)   magnesium sulfate 2 g in water intermittent infusion (0 g Intravenous Stopped 5/20/22 2112)   iopamidol (ISOVUE-370) solution 75 mL (75 mLs Intravenous Given 5/20/22 2052)   sodium chloride (PF) 0.9% PF flush 100 mL (100 mLs Intravenous Given 5/20/22 2057)             Assessments & Plan (with Medical Decision Making)   MDM:    66 year old F who presents to the ED for shortness of breath with worsening hypoxia, associated chest pain.  Given nebs, steroids, and magnesium with some improvement.  Got asa with ems and trialed nitroglycerin with no improvement.  ECG with sinus tachycardia, bedside US with no significant RV strain.  Labs and CXR with no obvious new pathology, so CT obtained with no PE, some worsening of  likely malignant pulmonary nodules.  Tachypnea slowly improving, chest pain improved.  Still requires 4L NC and remains tachypneic so will admit for further work up.    On re-evaluation she is clinically improved.  I have discussed all test results and the plan of care with the patient, who is agreeable for admission.  Admitted to medicine.    I have reviewed the nursing notes.    I have reviewed the findings, diagnosis, plan and need for follow up with the patient.    New Prescriptions    No medications on file       Final diagnoses:   Shortness of breath   Hypoxia   Chest pain, unspecified type     I, Michael Aguilar, am serving as a trained medical scribe to document services personally performed by Kimi Lewis MD, based on the provider's statements to me.      I, Kimi Lewis MD, was physically present and have reviewed and verified the accuracy of this note documented by Michael Aguilar.    5/20/2022   [facility] EMERGENCY DEPARTMENT

## 2022-05-20 NOTE — ED TRIAGE NOTES
"Pt BIBA for shortness of breath and midsternal chest pain. Symptoms started 4 days ago. Pt received 2 rounds of \"nitroglycerin spray with little relief; 324 mg of aspirin; one duoneb; one albuterol neb.        "

## 2022-05-20 NOTE — ED NOTES
Bed: ED18  Expected date: 5/20/22  Expected time: 6:06 PM  Means of arrival: Ambulance  Comments:  [id number]  66F  SOB, chest pain for 4 days

## 2022-05-20 NOTE — LETTER
To whom it may concern,    I saw and managed care for Ms. Aparicio during her hospitalization at the [facility]. 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 Given patient's diagnosis, I would recommend informing the patient of any work ongoing in her apartment and making appropriate arrangements as needed so she is not present at home during work.        Aster Elias MD

## 2022-05-21 ENCOUNTER — APPOINTMENT (OUTPATIENT)
Dept: PHYSICAL THERAPY | Facility: CLINIC | Age: 67
DRG: 190 | End: 2022-05-21
Payer: COMMERCIAL

## 2022-05-21 ENCOUNTER — APPOINTMENT (OUTPATIENT)
Dept: SPEECH THERAPY | Facility: CLINIC | Age: 67
DRG: 190 | End: 2022-05-21
Payer: COMMERCIAL

## 2022-05-21 PROBLEM — E87.6 HYPOKALEMIA: Status: ACTIVE | Noted: 2022-05-21

## 2022-05-21 PROBLEM — R06.02 SHORTNESS OF BREATH: Status: ACTIVE | Noted: 2022-05-21

## 2022-05-21 PROBLEM — R07.9 CHEST PAIN, UNSPECIFIED TYPE: Status: ACTIVE | Noted: 2022-05-21

## 2022-05-21 PROBLEM — E46 MALNUTRITION (H): Status: ACTIVE | Noted: 2021-06-25

## 2022-05-21 LAB
ALBUMIN UR-MCNC: 30 MG/DL
ANION GAP SERPL CALCULATED.3IONS-SCNC: 8 MMOL/L (ref 3–14)
APPEARANCE UR: CLEAR
ATRIAL RATE - MUSE: 119 BPM
BILIRUB UR QL STRIP: NEGATIVE
BUN SERPL-MCNC: 7 MG/DL (ref 7–30)
CALCIUM SERPL-MCNC: 9.2 MG/DL (ref 8.5–10.1)
CHLORIDE BLD-SCNC: 101 MMOL/L (ref 94–109)
CO2 SERPL-SCNC: 31 MMOL/L (ref 20–32)
COLOR UR AUTO: ABNORMAL
CREAT SERPL-MCNC: 0.45 MG/DL (ref 0.52–1.04)
DIASTOLIC BLOOD PRESSURE - MUSE: NORMAL MMHG
ERYTHROCYTE [DISTWIDTH] IN BLOOD BY AUTOMATED COUNT: 12.8 % (ref 10–15)
GFR SERPL CREATININE-BSD FRML MDRD: >90 ML/MIN/1.73M2
GLUCOSE BLD-MCNC: 333 MG/DL (ref 70–99)
GLUCOSE BLDC GLUCOMTR-MCNC: 310 MG/DL (ref 70–99)
GLUCOSE BLDC GLUCOMTR-MCNC: 327 MG/DL (ref 70–99)
GLUCOSE BLDC GLUCOMTR-MCNC: 333 MG/DL (ref 70–99)
GLUCOSE BLDC GLUCOMTR-MCNC: 397 MG/DL (ref 70–99)
GLUCOSE UR STRIP-MCNC: >=1000 MG/DL
HBA1C MFR BLD: 8.4 % (ref 0–5.6)
HCT VFR BLD AUTO: 42.6 % (ref 35–47)
HGB BLD-MCNC: 13.4 G/DL (ref 11.7–15.7)
HGB UR QL STRIP: NEGATIVE
INTERPRETATION ECG - MUSE: NORMAL
KETONES UR STRIP-MCNC: 10 MG/DL
LACTATE SERPL-SCNC: 2.5 MMOL/L (ref 0.7–2)
LEUKOCYTE ESTERASE UR QL STRIP: NEGATIVE
MAGNESIUM SERPL-MCNC: 2 MG/DL (ref 1.6–2.3)
MCH RBC QN AUTO: 28.4 PG (ref 26.5–33)
MCHC RBC AUTO-ENTMCNC: 31.5 G/DL (ref 31.5–36.5)
MCV RBC AUTO: 90 FL (ref 78–100)
NITRATE UR QL: NEGATIVE
P AXIS - MUSE: 91 DEGREES
PH UR STRIP: 6 [PH] (ref 5–7)
PHOSPHATE SERPL-MCNC: 2.9 MG/DL (ref 2.5–4.5)
PLATELET # BLD AUTO: 266 10E3/UL (ref 150–450)
POTASSIUM BLD-SCNC: 4 MMOL/L (ref 3.4–5.3)
PR INTERVAL - MUSE: 154 MS
QRS DURATION - MUSE: 62 MS
QT - MUSE: 306 MS
QTC - MUSE: 430 MS
R AXIS - MUSE: 66 DEGREES
RBC # BLD AUTO: 4.72 10E6/UL (ref 3.8–5.2)
RBC URINE: <1 /HPF
SODIUM SERPL-SCNC: 140 MMOL/L (ref 133–144)
SP GR UR STRIP: 1.01 (ref 1–1.03)
SQUAMOUS EPITHELIAL: 1 /HPF
SYSTOLIC BLOOD PRESSURE - MUSE: NORMAL MMHG
T AXIS - MUSE: 35 DEGREES
TROPONIN I SERPL HS-MCNC: 5 NG/L
UROBILINOGEN UR STRIP-MCNC: NORMAL MG/DL
VENTRICULAR RATE- MUSE: 119 BPM
WBC # BLD AUTO: 7.5 10E3/UL (ref 4–11)
WBC URINE: <1 /HPF

## 2022-05-21 PROCEDURE — 93010 ELECTROCARDIOGRAM REPORT: CPT | Performed by: INTERNAL MEDICINE

## 2022-05-21 PROCEDURE — 36415 COLL VENOUS BLD VENIPUNCTURE: CPT | Performed by: STUDENT IN AN ORGANIZED HEALTH CARE EDUCATION/TRAINING PROGRAM

## 2022-05-21 PROCEDURE — 92610 EVALUATE SWALLOWING FUNCTION: CPT | Mod: GN

## 2022-05-21 PROCEDURE — 250N000013 HC RX MED GY IP 250 OP 250 PS 637: Performed by: STUDENT IN AN ORGANIZED HEALTH CARE EDUCATION/TRAINING PROGRAM

## 2022-05-21 PROCEDURE — 250N000011 HC RX IP 250 OP 636: Performed by: STUDENT IN AN ORGANIZED HEALTH CARE EDUCATION/TRAINING PROGRAM

## 2022-05-21 PROCEDURE — 99223 1ST HOSP IP/OBS HIGH 75: CPT | Mod: AI | Performed by: STUDENT IN AN ORGANIZED HEALTH CARE EDUCATION/TRAINING PROGRAM

## 2022-05-21 PROCEDURE — 92526 ORAL FUNCTION THERAPY: CPT | Mod: GN

## 2022-05-21 PROCEDURE — 94640 AIRWAY INHALATION TREATMENT: CPT | Mod: 76

## 2022-05-21 PROCEDURE — 85027 COMPLETE CBC AUTOMATED: CPT | Performed by: STUDENT IN AN ORGANIZED HEALTH CARE EDUCATION/TRAINING PROGRAM

## 2022-05-21 PROCEDURE — 84484 ASSAY OF TROPONIN QUANT: CPT | Performed by: STUDENT IN AN ORGANIZED HEALTH CARE EDUCATION/TRAINING PROGRAM

## 2022-05-21 PROCEDURE — 97530 THERAPEUTIC ACTIVITIES: CPT | Mod: GP

## 2022-05-21 PROCEDURE — 250N000009 HC RX 250: Performed by: STUDENT IN AN ORGANIZED HEALTH CARE EDUCATION/TRAINING PROGRAM

## 2022-05-21 PROCEDURE — 83605 ASSAY OF LACTIC ACID: CPT | Performed by: STUDENT IN AN ORGANIZED HEALTH CARE EDUCATION/TRAINING PROGRAM

## 2022-05-21 PROCEDURE — 94640 AIRWAY INHALATION TREATMENT: CPT

## 2022-05-21 PROCEDURE — 250N000012 HC RX MED GY IP 250 OP 636 PS 637: Performed by: STUDENT IN AN ORGANIZED HEALTH CARE EDUCATION/TRAINING PROGRAM

## 2022-05-21 PROCEDURE — 97161 PT EVAL LOW COMPLEX 20 MIN: CPT | Mod: GP

## 2022-05-21 PROCEDURE — 999N000111 HC STATISTIC OT IP EVAL DEFER

## 2022-05-21 PROCEDURE — 999N000157 HC STATISTIC RCP TIME EA 10 MIN

## 2022-05-21 PROCEDURE — 120N000005 HC R&B MS OVERFLOW UMMC

## 2022-05-21 PROCEDURE — 81001 URINALYSIS AUTO W/SCOPE: CPT | Performed by: STUDENT IN AN ORGANIZED HEALTH CARE EDUCATION/TRAINING PROGRAM

## 2022-05-21 PROCEDURE — 80048 BASIC METABOLIC PNL TOTAL CA: CPT | Performed by: STUDENT IN AN ORGANIZED HEALTH CARE EDUCATION/TRAINING PROGRAM

## 2022-05-21 RX ORDER — NICOTINE 21 MG/24HR
1 PATCH, TRANSDERMAL 24 HOURS TRANSDERMAL DAILY
Status: DISCONTINUED | OUTPATIENT
Start: 2022-05-21 | End: 2022-05-24 | Stop reason: HOSPADM

## 2022-05-21 RX ORDER — ASPIRIN 81 MG/1
81 TABLET ORAL DAILY
Status: DISCONTINUED | OUTPATIENT
Start: 2022-05-21 | End: 2022-05-24 | Stop reason: HOSPADM

## 2022-05-21 RX ORDER — ENOXAPARIN SODIUM 100 MG/ML
40 INJECTION SUBCUTANEOUS EVERY 24 HOURS
Status: DISCONTINUED | OUTPATIENT
Start: 2022-05-21 | End: 2022-05-24 | Stop reason: HOSPADM

## 2022-05-21 RX ORDER — BLOOD-GLUCOSE METER
EACH MISCELLANEOUS
COMMUNITY
Start: 2022-05-19 | End: 2023-05-05

## 2022-05-21 RX ORDER — GLIPIZIDE 5 MG/1
5 TABLET ORAL
Status: DISCONTINUED | OUTPATIENT
Start: 2022-05-21 | End: 2022-05-24 | Stop reason: HOSPADM

## 2022-05-21 RX ORDER — BENZONATATE 100 MG/1
200 CAPSULE ORAL 3 TIMES DAILY PRN
Status: DISCONTINUED | OUTPATIENT
Start: 2022-05-21 | End: 2022-05-24 | Stop reason: HOSPADM

## 2022-05-21 RX ORDER — AZITHROMYCIN 500 MG/1
500 TABLET, FILM COATED ORAL DAILY
Status: COMPLETED | OUTPATIENT
Start: 2022-05-22 | End: 2022-05-23

## 2022-05-21 RX ORDER — ACETAMINOPHEN 325 MG/1
650 TABLET ORAL EVERY 6 HOURS PRN
Status: DISCONTINUED | OUTPATIENT
Start: 2022-05-21 | End: 2022-05-24 | Stop reason: HOSPADM

## 2022-05-21 RX ORDER — AZITHROMYCIN 250 MG/1
500 TABLET, FILM COATED ORAL ONCE
Status: COMPLETED | OUTPATIENT
Start: 2022-05-21 | End: 2022-05-21

## 2022-05-21 RX ORDER — DEXTROSE MONOHYDRATE 25 G/50ML
25-50 INJECTION, SOLUTION INTRAVENOUS
Status: DISCONTINUED | OUTPATIENT
Start: 2022-05-21 | End: 2022-05-24 | Stop reason: HOSPADM

## 2022-05-21 RX ORDER — OMEGA-3-ACID ETHYL ESTERS 1 G/1
CAPSULE, LIQUID FILLED ORAL
COMMUNITY
Start: 2022-05-19 | End: 2022-06-01

## 2022-05-21 RX ORDER — SODIUM CHLORIDE FOR INHALATION 3 %
3 VIAL, NEBULIZER (ML) INHALATION DAILY
Status: DISCONTINUED | OUTPATIENT
Start: 2022-05-21 | End: 2022-05-24 | Stop reason: HOSPADM

## 2022-05-21 RX ORDER — NICOTINE POLACRILEX 4 MG
15-30 LOZENGE BUCCAL
Status: DISCONTINUED | OUTPATIENT
Start: 2022-05-21 | End: 2022-05-24 | Stop reason: HOSPADM

## 2022-05-21 RX ORDER — FLUTICASONE PROPIONATE 50 MCG
1 SPRAY, SUSPENSION (ML) NASAL DAILY
Status: DISCONTINUED | OUTPATIENT
Start: 2022-05-21 | End: 2022-05-24 | Stop reason: HOSPADM

## 2022-05-21 RX ORDER — AZITHROMYCIN 250 MG/1
250 TABLET, FILM COATED ORAL DAILY
Status: DISCONTINUED | OUTPATIENT
Start: 2022-05-22 | End: 2022-05-21

## 2022-05-21 RX ORDER — ENOXAPARIN SODIUM 100 MG/ML
40 INJECTION SUBCUTANEOUS EVERY 24 HOURS
Status: DISCONTINUED | OUTPATIENT
Start: 2022-05-21 | End: 2022-05-21

## 2022-05-21 RX ORDER — PANTOPRAZOLE SODIUM 40 MG/1
40 TABLET, DELAYED RELEASE ORAL
Status: DISCONTINUED | OUTPATIENT
Start: 2022-05-22 | End: 2022-05-24 | Stop reason: HOSPADM

## 2022-05-21 RX ORDER — OLOPATADINE HYDROCHLORIDE 1 MG/ML
1 SOLUTION/ DROPS OPHTHALMIC DAILY
Status: DISCONTINUED | OUTPATIENT
Start: 2022-05-21 | End: 2022-05-24 | Stop reason: HOSPADM

## 2022-05-21 RX ORDER — GUAIFENESIN 600 MG/1
1200 TABLET, EXTENDED RELEASE ORAL 2 TIMES DAILY
Status: DISCONTINUED | OUTPATIENT
Start: 2022-05-21 | End: 2022-05-24 | Stop reason: HOSPADM

## 2022-05-21 RX ORDER — PREDNISONE 20 MG/1
40 TABLET ORAL DAILY
Status: DISCONTINUED | OUTPATIENT
Start: 2022-05-21 | End: 2022-05-24 | Stop reason: HOSPADM

## 2022-05-21 RX ORDER — FAMOTIDINE 10 MG
20 TABLET ORAL ONCE
Status: COMPLETED | OUTPATIENT
Start: 2022-05-21 | End: 2022-05-21

## 2022-05-21 RX ORDER — LEVALBUTEROL TARTRATE 45 UG/1
2 AEROSOL, METERED ORAL EVERY 6 HOURS PRN
Status: DISCONTINUED | OUTPATIENT
Start: 2022-05-21 | End: 2022-05-24 | Stop reason: HOSPADM

## 2022-05-21 RX ORDER — IBUPROFEN 400 MG/1
400 TABLET, FILM COATED ORAL EVERY 6 HOURS PRN
Status: DISCONTINUED | OUTPATIENT
Start: 2022-05-21 | End: 2022-05-24 | Stop reason: HOSPADM

## 2022-05-21 RX ORDER — LIDOCAINE 40 MG/G
CREAM TOPICAL
Status: DISCONTINUED | OUTPATIENT
Start: 2022-05-21 | End: 2022-05-24 | Stop reason: HOSPADM

## 2022-05-21 RX ORDER — CYCLOBENZAPRINE HCL 5 MG
5 TABLET ORAL 3 TIMES DAILY PRN
Status: DISCONTINUED | OUTPATIENT
Start: 2022-05-21 | End: 2022-05-24 | Stop reason: HOSPADM

## 2022-05-21 RX ORDER — ACETAMINOPHEN 650 MG/1
650 SUPPOSITORY RECTAL EVERY 6 HOURS PRN
Status: DISCONTINUED | OUTPATIENT
Start: 2022-05-21 | End: 2022-05-24 | Stop reason: HOSPADM

## 2022-05-21 RX ORDER — AMLODIPINE BESYLATE 5 MG/1
10 TABLET ORAL DAILY
Status: DISCONTINUED | OUTPATIENT
Start: 2022-05-21 | End: 2022-05-24 | Stop reason: HOSPADM

## 2022-05-21 RX ORDER — LEVALBUTEROL INHALATION SOLUTION 1.25 MG/3ML
1.25 SOLUTION RESPIRATORY (INHALATION) EVERY 6 HOURS
Status: DISCONTINUED | OUTPATIENT
Start: 2022-05-21 | End: 2022-05-24 | Stop reason: HOSPADM

## 2022-05-21 RX ORDER — LIDOCAINE 4 G/G
1 PATCH TOPICAL
Status: DISCONTINUED | OUTPATIENT
Start: 2022-05-21 | End: 2022-05-24 | Stop reason: HOSPADM

## 2022-05-21 RX ORDER — CETIRIZINE HYDROCHLORIDE 10 MG/1
10 TABLET ORAL DAILY
Status: DISCONTINUED | OUTPATIENT
Start: 2022-05-21 | End: 2022-05-24 | Stop reason: HOSPADM

## 2022-05-21 RX ORDER — IPRATROPIUM BROMIDE AND ALBUTEROL SULFATE 2.5; .5 MG/3ML; MG/3ML
3 SOLUTION RESPIRATORY (INHALATION)
Status: DISCONTINUED | OUTPATIENT
Start: 2022-05-21 | End: 2022-05-21

## 2022-05-21 RX ORDER — AMOXICILLIN 250 MG
2 CAPSULE ORAL 2 TIMES DAILY PRN
Status: DISCONTINUED | OUTPATIENT
Start: 2022-05-21 | End: 2022-05-24 | Stop reason: HOSPADM

## 2022-05-21 RX ORDER — AMOXICILLIN 250 MG
1 CAPSULE ORAL 2 TIMES DAILY PRN
Status: DISCONTINUED | OUTPATIENT
Start: 2022-05-21 | End: 2022-05-24 | Stop reason: HOSPADM

## 2022-05-21 RX ADMIN — UMECLIDINIUM 1 PUFF: 62.5 AEROSOL, POWDER ORAL at 16:19

## 2022-05-21 RX ADMIN — PREDNISONE 40 MG: 20 TABLET ORAL at 07:51

## 2022-05-21 RX ADMIN — ENOXAPARIN SODIUM 40 MG: 40 INJECTION SUBCUTANEOUS at 07:56

## 2022-05-21 RX ADMIN — INSULIN ASPART 4 UNITS: 100 INJECTION, SOLUTION INTRAVENOUS; SUBCUTANEOUS at 16:23

## 2022-05-21 RX ADMIN — INSULIN ASPART 4 UNITS: 100 INJECTION, SOLUTION INTRAVENOUS; SUBCUTANEOUS at 11:46

## 2022-05-21 RX ADMIN — LIDOCAINE 1 PATCH: 560 PATCH PERCUTANEOUS; TOPICAL; TRANSDERMAL at 15:05

## 2022-05-21 RX ADMIN — GUAIFENESIN 1200 MG: 600 TABLET ORAL at 20:41

## 2022-05-21 RX ADMIN — IPRATROPIUM BROMIDE 0.5 MG: 0.5 SOLUTION RESPIRATORY (INHALATION) at 13:38

## 2022-05-21 RX ADMIN — INSULIN ASPART 4 UNITS: 100 INJECTION, SOLUTION INTRAVENOUS; SUBCUTANEOUS at 07:58

## 2022-05-21 RX ADMIN — IPRATROPIUM BROMIDE 0.5 MG: 0.5 SOLUTION RESPIRATORY (INHALATION) at 20:29

## 2022-05-21 RX ADMIN — OLOPATADINE HYDROCHLORIDE 1 DROP: 1 SOLUTION OPHTHALMIC at 07:54

## 2022-05-21 RX ADMIN — FLUTICASONE FUROATE AND VILANTEROL TRIFENATATE 1 PUFF: 200; 25 POWDER RESPIRATORY (INHALATION) at 16:19

## 2022-05-21 RX ADMIN — POLYETHYLENE GLYCOL 400 AND PROPYLENE GLYCOL 1 DROP: 4; 3 SOLUTION/ DROPS OPHTHALMIC at 20:39

## 2022-05-21 RX ADMIN — IPRATROPIUM BROMIDE 0.5 MG: 0.5 SOLUTION RESPIRATORY (INHALATION) at 08:01

## 2022-05-21 RX ADMIN — SODIUM CHLORIDE SOLN NEBU 3% 3 ML: 3 NEBU SOLN at 08:04

## 2022-05-21 RX ADMIN — NICOTINE 1 PATCH: 14 PATCH, EXTENDED RELEASE TRANSDERMAL at 15:13

## 2022-05-21 RX ADMIN — LEVALBUTEROL HYDROCHLORIDE 1.25 MG: 1.25 SOLUTION RESPIRATORY (INHALATION) at 13:38

## 2022-05-21 RX ADMIN — AMLODIPINE BESYLATE 10 MG: 5 TABLET ORAL at 07:50

## 2022-05-21 RX ADMIN — GUAIFENESIN 1200 MG: 600 TABLET ORAL at 07:53

## 2022-05-21 RX ADMIN — ASPIRIN 81 MG: 81 TABLET, COATED ORAL at 07:50

## 2022-05-21 RX ADMIN — LEVALBUTEROL HYDROCHLORIDE 1.25 MG: 1.25 SOLUTION RESPIRATORY (INHALATION) at 20:29

## 2022-05-21 RX ADMIN — POLYETHYLENE GLYCOL 400 AND PROPYLENE GLYCOL 1 DROP: 4; 3 SOLUTION/ DROPS OPHTHALMIC at 07:55

## 2022-05-21 RX ADMIN — IBUPROFEN 400 MG: 400 TABLET, FILM COATED ORAL at 14:50

## 2022-05-21 RX ADMIN — CETIRIZINE HYDROCHLORIDE 10 MG: 10 TABLET, FILM COATED ORAL at 07:52

## 2022-05-21 RX ADMIN — BENZONATATE 200 MG: 100 CAPSULE ORAL at 15:00

## 2022-05-21 RX ADMIN — CYCLOBENZAPRINE HYDROCHLORIDE 5 MG: 5 TABLET, FILM COATED ORAL at 15:00

## 2022-05-21 RX ADMIN — IPRATROPIUM BROMIDE AND ALBUTEROL SULFATE 3 ML: .5; 3 SOLUTION RESPIRATORY (INHALATION) at 04:28

## 2022-05-21 RX ADMIN — AZITHROMYCIN 500 MG: 250 TABLET, FILM COATED ORAL at 04:27

## 2022-05-21 RX ADMIN — GLIPIZIDE 5 MG: 5 TABLET ORAL at 16:18

## 2022-05-21 RX ADMIN — FAMOTIDINE 20 MG: 10 TABLET, FILM COATED ORAL at 15:11

## 2022-05-21 RX ADMIN — GLIPIZIDE 5 MG: 5 TABLET ORAL at 07:53

## 2022-05-21 RX ADMIN — POLYETHYLENE GLYCOL 400 AND PROPYLENE GLYCOL 1 DROP: 4; 3 SOLUTION/ DROPS OPHTHALMIC at 16:18

## 2022-05-21 RX ADMIN — IBUPROFEN 400 MG: 400 TABLET, FILM COATED ORAL at 04:28

## 2022-05-21 RX ADMIN — POLYETHYLENE GLYCOL 400 AND PROPYLENE GLYCOL 1 DROP: 4; 3 SOLUTION/ DROPS OPHTHALMIC at 11:50

## 2022-05-21 ASSESSMENT — ACTIVITIES OF DAILY LIVING (ADL)
ADLS_ACUITY_SCORE: 35

## 2022-05-21 NOTE — CODE/RAPID RESPONSE
Rapid Response Team Note    Assessment   A rapid response was called on Jenn Apariico due to lactic acidosis. This presentation is likely due to a COPD exacerbation.    This is a 66 year old with a history of treated lung cancer with possible reoccurrence admitted with a COPD exacerbation.    I was called for a lactic acid elevation.   She denies any fevers or chills.  She has some persistent chest pressure ongoing for ~3 weeks but no acute changes today.  No abdominal pain.    - Suspect lactic acid elevation driven by COPD and albuterol use.  I do not see hypotension, leukocytosis or fevers concerning for sepsis.  Would continue to monitor vital signs and escalate antibiotics as appropriate.  - No indication for IV fluids at this time  - Spoke with primary team, they are in agreement with plan    Plan   -  As above  -  The [facility] was able to be reached and they are in agreement with the above plan.  -  Disposition: The patient will remain on the current unit. We will continue to monitor this patient closely.  -  Reassessment and plan follow-up will be performed by the rapid response team    BENOIT Stern CNP  [facility] Job Code Contact #[phone]  [facility] Paging/Directory    Hospital Course   Brief Summary of events leading to rapid response:   As above    Admission Diagnosis:   Shortness of breath [R06.02]  Hypoxia [R09.02]  Chest pain, unspecified type [R07.9]    Physical Exam   Temp: 98.5  F (36.9  C) Temp  Min: 98.5  F (36.9  C)  Max: 98.9  F (37.2  C)  Resp: 24 Resp  Min: 18  Max: 29  SpO2: 100 % SpO2  Min: 94 %  Max: 100 %  Pulse: 107 Pulse  Min: 61  Max: 144    No data recorded  BP: (!) 144/79 Systolic (24hrs), Av , Min:135 , Max:157   Diastolic (24hrs), Av, Min:61, Max:79     I/Os: No intake/output data recorded.     Exam:   General: chronically ill appearing  Mental Status: AAOx4.  Mild tachycardia, good air movement all lung fields    Significant Results and Procedures    Lactic Acid:   Recent Labs   Lab Test 05/21/22  1012 02/25/21  0620 02/24/21  2035 02/24/21  1349 02/24/21  1110   LACT 2.5* 1.9 3.7*   < >  --    LACTS  --   --   --   --  5.3*    < > = values in this interval not displayed.     CBC:   Recent Labs   Lab Test 05/21/22  0634 05/20/22  1836 05/04/22  1333   WBC 7.5 8.7 6.4   HGB 13.4 14.0 13.9   HCT 42.6 44.7 44.3    282 183        Sepsis Evaluation   The patient is not known to have an infection.  NO EVIDENCE OF SEPSIS at this time.  Vital sign, physical exam, and lab findings are due to COPD exac.

## 2022-05-21 NOTE — PROGRESS NOTES
"   05/21/22 1400   General Information   Onset of Illness/Injury or Date of Surgery 05/20/22   Referring Physician Aster Elias MD   Pertinent History of Current Problem Jenn Aparicio is a 66 year old female with a history of severe COPD (FEV1 24%) with baseline O2 need of 3 L, RLL adenocarcinoma s/p lobectomy (2/2016), RUL NSCLC s/p SBRT (01/2020), other pulmonary nodules, HTN, T2DM, and tobacco use who is admitted 5/20/22 for COPD exacerbation. Clinicial swallow evaluation completed per MD orders.   Past History of Dysphagia None per EMR review. Pt reports she had an imaging test years ago that said she \"digested food slowly\".   Type of Evaluation   Type of Evaluation Swallow Evaluation   Oral Motor   Oral Musculature generally intact   Structural Abnormalities none present   Mucosal Quality good   Dentition (Oral Motor)   Dentition (Oral Motor) some missing teeth   Facial Symmetry (Oral Motor)   Facial Symmetry (Oral Motor) WNL   Lip Function (Oral Motor)   Lip Range of Motion (Oral Motor) WNL   Tongue Function (Oral Motor)   Tongue ROM (Oral Motor) WNL   Cough/Swallow/Gag Reflex (Oral Motor)   Volitional Throat Clear/Cough (Oral Motor) WNL   Volitional Swallow (Oral Motor) WNL   Vocal Quality/Secretion Management (Oral Motor)   Vocal Quality (Oral Motor) WNL   Secretion Management (Oral Motor) WNL   General Swallowing Observations   Respiratory Support (General Swallowing Observations) nasal cannula   Current Diet/Method of Nutritional Intake (General Swallowing Observations, NIS) regular diet   Swallowing Evaluation Clinical swallow evaluation   Clinical Swallow Evaluation   Feeding Assistance no assistance needed   Additional evaluation(s) completed today No   Clinical Swallow Evaluation Textures Trialed thin liquids;solid foods   Clinical Swallow Eval: Thin Liquid Texture Trial   Mode of Presentation, Thin Liquids straw;self-fed   Volume of Liquid or Food Presented 5 oz   Oral Phase of Swallow WFL "   Pharyngeal Phase of Swallow intact   Diagnostic Statement No overt s/s aspiration   Clinical Swallow Evaluation: Solid Food Texture Trial   Mode of Presentation self-fed   Volume Presented 1 cracker   Oral Phase WFL   Pharyngeal Phase intact   Diagnostic Statement No overt s/s aspiration   Esophageal Phase of Swallow   Patient reports or presents with symptoms of esophageal dysphagia Yes   Esophageal comments Pt reports sticking sensation and points to mid chest area. She reports this has been going on for a couple weeks and is impacting her intake.   Swallowing Recommendations   Diet Consistency Recommendations regular diet;thin liquids (level 0)   Supervision Level for Intake patient independent   Mode of Delivery Recommendations bolus size, small;slow rate of intake   Swallowing Maneuver Recommendations alternate food and liquid intake   Monitoring/Assistance Required (Eating/Swallowing) monitor for cough or change in vocal quality with intake   Recommended Feeding/Eating Techniques (Swallow Eval) maintain upright posture during/after eating for 30 minutes;provide 6 smaller meals throughout day   Medication Administration Recommendations, Swallowing (SLP) okay for whole with thin liquid   Instrumental Assessment Recommendations instrumental evaluation not recommended at this time   Clinical Impression   Criteria for Skilled Therapeutic Interventions Met (SLP Eval) No problems identified which require skilled intervention   SLP Diagnosis Functional oropharyngeal swallowing mechanism   Risks & Benefits of therapy have been explained evaluation/treatment results reviewed;care plan/treatment goals reviewed;risks/benefits reviewed;current/potential barriers reviewed;participants voiced agreement with care plan;participants included;patient   Clinical Impression Comments SLP: Bedside swallow evaluation completed per MD orders. Pt noted to have some missing teeth, otherwise oral mechanism exam was unremarkable. Pt  presents with functional oropharyngeal swallowing mechanism and regular/thin liquid diet is recommended. Pt was assessed with regular solids and thin liquids. Pt demonstrated functional and complete mastication, adequate bolus control, no oral residuals, was able to clear bolus with single swallow, no change in vocal quality following PO trials, and no overt s/s aspiration noted. However note that pt does report sticking sensation and points to mid chest area. She reports this has been going on for a couple weeks and is impacting her intake. Pt would benefit from further GI work up and/or esophogram as indicated to further assess esophageal motility. No ongoing speech therapy warranted as oropharyngeal swallowing mechanism appears intact. Reviewed recommendations and rational with pt who verbalizes agreement and understanding.   SLP Discharge Planning   SLP Discharge Recommendation home   SLP Rationale for DC Rec Functional oropharyngeal swallowing mechanism   SLP Brief overview of current status  Recommend Regular/thin liquid diet. Pt should be fully upright during and 30 minutes after meals, alternate solids and liquids, and have 6 small meals/day.  Pt would benefit from further GI work up and/or esophogram as indicated to further assess esophageal motility. No ongoing speech therapy warranted as oropharyngeal swallowing mechanism appears intact. Reviewed recommendations and rational with pt who verbalizes agreement and understanding and verbalizes she would appreciate further GI work up.    Total Evaluation Time   Total Evaluation Time (Minutes) 15   SLP Goals   Therapy Frequency (SLP Eval) one time eval and treatment only

## 2022-05-21 NOTE — PROGRESS NOTES
[facility]   BRIEF PROGRESS NOTE    SUBJECTIVE  Paged by charge RN about patient uncomfortable with chest pain. Per discussion, charge RN was informed that patient's nurse attempted to contact [facility] for pain control multiple times, but this was the first page we received asking for pain medications.     I saw and evaluated the patient at the bedside, her chest pain is similar to baseline pain that she notes is secondary to coughing. Though occasionally she notes burning chest pain. Pain is non-radiating, and localized to the epigastric region.       OBJECTIVE:  /60 (BP Location: Left arm)   Pulse 107   Temp 98.3  F (36.8  C)   Resp 24   Wt 95.7 kg (210 lb 15.7 oz)   SpO2 97%   BMI 34.05 kg/m      Exam:   General: Alert and oriented, in no acute distress.  Skin: Warm and dry, no abnormalities noted.  Eyes: Extra-ocular muscles intact, pupils equal and reactive.  ENT: Speech intact, nasal passages open, no hearing impairment noted.  CV: No cyanosis or pallor, warm and well perfused. Reproducible chest pain, tenderness to palpation in the epigastric   Respiratory: No respiratory distress, no accessory muscle use. Bilateral wheezes present.   Neuro: Gait and station normal, comprehension intact. Gross and fine motor skills intact.   Psychiatric: Mood and affect appear normal.   Extremities: Warm, able to move all four extremities at will.     ASSESSMENT/PLAN:    # Chest pain, likely musculoskeletal   Patient has been noting chest pain with coughing, and occasional burning chest pain, non-radiating. Pain is reproducible with palpation to the epigastric region / inferior chest wall. Negative ACS work up in ED with normal troponin and ECG without ST elevation.   1. Pain control with tylenol, ibuprofen, lidocaine patch, heat and ice  2. Repeat EKG and troponin     Please see daily rounding note for full A/P.  Aster Elias MD  2:58 PM

## 2022-05-21 NOTE — PROGRESS NOTES
[facility]    Progress Note - [facility]       Date of Admission:  5/20/2022    Assessment & Plan            Jenn Aparicio is a 66 year old female with a history of severe COPD (FEV1 24%) with baseline O2 need of 3 L, RLL adenocarcinoma s/p lobectomy (2/2016), RUL NSCLC s/p SBRT (01/2020), other pulmonary nodules, HTN, T2DM, and tobacco use who is admitted 5/20/22 for COPD exacerbation.       # Dyspnea at rest and with exertion  # Hypoxia, improved  # Productive cough  # COPD, severe (FEV1 24%)  # Baseline O2 need of 3 LPM  # Non-cardiac chest pain  # Tobacco use  66-year-old patient with history of severe COPD, lung cancer (see below) who presents with 3 weeks of shortness of breath, acutely worsening in the last 3 days in the setting of multiple environmental triggers and disjointed medication use due to pharmacy issues.  Patient had completed COPD exacerbation treatment with short course prednisone, doxycycline per pulmonologist Dr. Spann after appt on 5/4/2022.  Patient reported only modest improvement with this.  Environmental triggers include home mold, recent painting in the home, secondhand smoke in apartment building.  Last tobacco use over 4 weeks ago; does not smoke with worsened dyspnea.  Other associated symptoms include productive cough, intermittent dizziness, hypoxia down to the 60s-70s with exertion on baseline O2 of 3 LPM, CP.  Reports no fever, and with normal WBCs, imaging findings, high suspicion for isolated COPD exacerbation. Last hospitalized 6/22-6/25/2021 for the same.  Chest pain most likely noncardiac with admission ECG showing sinus tachycardia and troponin WNL, no improvement w/ EMS nitroglycerin spray. Pain is also reproducible with palpation Exacerbation appears to be moderate, will treat with oral meds/inhalers where appropriate.  - S/p magnesium sulfate in ED  - Inhalers/nebs              -  "Fluticasone-Umeclidin-Vilanterol (Trelegy) 1 puff daily                     - ipratropium + levalbuterol neb QID per RT, space as able              - NaCl neb daily              - Levalbuterol inhaler 2 puff q6h PRN  - Steroid              - S/p methylprednisolone x1 in ED              - Prednisone 40 mg PO daily x5 days (5/21 - )  - Abx: azithromycin 500 mg for 3 days  - Mucolytic: guaifenesin 1200 mg BID  - Flonase bilaterally daily  - Cetirizine 10 mg daily  - Nicotine replacement patches  - Continuous pulse ox  - VS q8h  - COPD consult, appreciate recs  - Smoking cession consult, appreciate assistance   - Pain: acetaminophen 650 mg q6h prn, ibuprofen 400 mg q6h PRN  - Daily BMP  - Daily CBC  - Discharge planning, future: Will be important establish reliable outpatient pharmacy for future refills to prevent readmission.     # NSCLC, adenocarcinoma, RUL  # RLL nodule  # ROBERT nodule, new  # S/p RL lobectomy 2016  From 3/31/22 oncology note: \"NSCLC, adeno, RUL: Now just over 2 years after SBRT. CT was personally reviewed. New RLL nodule is slightly longer. I'm still not sure whether it represents a malignancy or not. It would be difficult to bx. The ROBERT nodule is tiny. Both are still a little small for a PET to be useful.\" Plan was for repeat CT in 6 weeks (5/4/22), which demonstrated stable RUL nodule and stable ROBERT nodule but with \"new associated patchy irregular adjacent nodules and ill-defined opacities, unclear if cancer progression vs infection vs inflammatory.\" Oncologist Dr. Leung had called pt on 5/13 (left VM) to recommend repeat CT chest non-contrast in 6-8 weeks to follow up these findings, which are suspected to be inflammatory.   - Forward chart to Dr. Leung on discharge to prompt ordering of follow up imaging if not already done     # Tachycardia  Tachycardic in the setting of recent nebs.  ECG on admission showed sinus tach.  - Will utilized levalbuterol for PRN use (above) to mitigate " tachycardia  - Continue to monitor     # Flank pain, right  Intermittent right-sided flank pain associated with COPD exacerbation, dyspnea associated chest pain.  Worse with exertion, relieved by rest.  No dysuria or hematuria on admission, though does note recent intermittent dysuria.  No history of nephrolithiasis.  - UA  - PTA cyclobenzaprine 5 mg TID PRN     # Poor PO intake  # Concern for malnutrition  # Dysphagia, unclear etiology  Reports little to no PO intake in the days prior to admission, secondary to low appetite. Patient also notes some difficulty swallowing, and feeling like food is getting stuck in her throat. This is contributing to her decreased PO intake.   - add on mag, phos  - Nutrition consult  - SLP eval for dysphagia   - Regular diet     # Deconditioning  In the setting of chronic illness.  - Fall precautions  - PT, OT consults      # T2DM  Last A1C 8.4 in 01/2022, up from 7.1 6 years ago.  on admission  - PTA glipizide 5 mg BID AC  - MSSI  - Hypoglycemia protocol  - Add on A1c     Chronic, stable:     # HTN  - PTA amlodipine 10 mg      # Allergic conjunctivitis  - olopatadine 1 drop bilaterally daily     # Dry eyes  - PEG drops 1 drop bilateral daily     # Suspected DAVID  Previously evaluated by Sleep (3/2021) with recommendation for lab sleep study given likely DAVID (STOP BANG 4/8). Has not yet had this evaluation.      Diet: Combination Diet Regular Diet Adult    DVT Prophylaxis: Enoxaparin (Lovenox) SQ  Andrade Catheter: Not present  Fluids: PO  Central Lines: None  Cardiac Monitoring: None  Code Status: Full Code      Disposition Plan   Expected Discharge: 05/23/2022     Anticipated discharge location: home vs TCU     The patient's care was discussed with the Attending Physician, Dr. Boyer.    Aster Elias MD  [facility]  [facility]  Securely message with the Vocera Web Console (learn more here)  Text page via [employer]  Paging/Directory   Please see signed in provider for up to date coverage information    ______________________________________________________________________    Interval History   NAEO. Notes some chest pain, worse with coughing, non-radiating, improves with rest.     Data reviewed today: I reviewed all medications, new labs and imaging results over the last 24 hours.     Physical Exam   Vital Signs:     BP: (!) 152/71 Pulse: 92   Resp: 18 SpO2: 94 % O2 Device: Nasal cannula Oxygen Delivery: 3 LPM  Weight: 210 lbs 15.68 oz    Physical Exam  Constitutional:       General: She is not in acute distress.     Appearance: Normal appearance. She is not diaphoretic.   HENT:      Head: Normocephalic and atraumatic.      Mouth/Throat:      Mouth: No oral lesions.      Dentition: Abnormal dentition. Dental caries present.      Pharynx: Oropharynx is clear.   Eyes:      Extraocular Movements: Extraocular movements intact.      Conjunctiva/sclera: Conjunctivae normal.   Cardiovascular:      Rate and Rhythm: Regular rhythm. Tachycardia present.      Pulses:           Radial pulses are 2+ on the right side and 2+ on the left side.        Posterior tibial pulses are 2+ on the right side and 2+ on the left side.      Heart sounds: Normal heart sounds.   Pulmonary:      Effort: Pulmonary effort is normal. No respiratory distress.      Breath sounds: Wheezing (throughout lung fields) and rhonchi (intermittent, bibasilar) present. No rales.   Abdominal:      General: Bowel sounds are normal.      Palpations: Abdomen is soft.      Tenderness: There is no abdominal tenderness.   Musculoskeletal:         General: No tenderness or deformity.      Cervical back: No muscular tenderness.      Right lower leg: Edema (mild) present.      Left lower leg: Edema (mild) present.   Lymphadenopathy:      Cervical: No cervical adenopathy.   Skin:     General: Skin is warm and dry.      Findings: No lesion or rash.   Neurological:      General: No  focal deficit present.      Mental Status: She is alert and oriented to person, place, and time. Mental status is at baseline.   Psychiatric:         Mood and Affect: Mood normal.         Behavior: Behavior normal.         Data   Recent Labs   Lab 05/21/22  1142 05/21/22  0634 05/21/22  0606 05/20/22  1836   WBC  --  7.5  --  8.7   HGB  --  13.4  --  14.0   MCV  --  90  --  90   PLT  --  266  --  282   NA  --  140  --  139   POTASSIUM  --  4.0  --  3.2*   CHLORIDE  --  101  --  101   CO2  --  31  --  31   BUN  --  7  --  7   CR  --  0.45*  --  0.46*   ANIONGAP  --  8  --  7   LUIGI  --  9.2  --  9.4   * 333* 327* 245*   ALBUMIN  --   --   --  3.6   PROTTOTAL  --   --   --  7.7   BILITOTAL  --   --   --  0.3   ALKPHOS  --   --   --  98   ALT  --   --   --  18   AST  --   --   --  8

## 2022-05-21 NOTE — PROGRESS NOTES
05/21/22 1000   Call Information   Date of Call 05/21/22   Time of Call 1029   Name of person requesting the team Sophie   Title of person requesting team RN   RRT Arrival time 1034   Time RRT ended 1040   Reason for call   Type of RRT Adult   Primary reason for call Sepsis suspected   Sepsis Suspected Elevated Lactate level;Heart Rate > 100;RR > 20, SaO2 <90% OR increasing O2 need   Was patient transferred from the ED, ICU, or PACU within last 24 hours prior to RRT call? Yes   SBAR   Situation LA=2.5   Background 66 year old female with a history of severe COPD (FEV1 24%) with baseline O2 need of 3 L, RLL adenocarcinoma s/p lobectomy (2/2016), RUL NSCLC s/p SBRT (01/2020), other pulmonary nodules, HTN, T2DM, and tobacco use who is admitted 5/20/22 for COPD exacerbation.   Notable History/Conditions COPD;Cancer   Assessment A/OX4, C/O mid chest pain, attributing it to cough, VSS on 3 LPM NC, except slightly tachycardic.   Interventions No interventions   Patient Outcome   Patient Outcome Stabilized on unit   RRT Team   Attending/Primary/Covering Physician Neela Monreal   Date Attending Physician notified 05/21/22   Time Attending Physician notified 1029   Physician(s) Fabian Barrios   RT Varish   Post RRT Intervention Assessment   Post RRT Assessment Stable/Improved   Date Follow Up Done 05/21/22   Time Follow Up Done 1438   Comments Reporting persistent chest pain, VSS on baseline O2 except slightly tachycardic and tachypneic

## 2022-05-21 NOTE — PROGRESS NOTES
CLINICAL NUTRITION SERVICES - ASSESSMENT NOTE     Nutrition Prescription    RECOMMENDATIONS FOR MDs/PROVIDERS TO ORDER:  Diet as tolerated ( regular diet vs. High consistent carb diet)     Malnutrition Status:    Moderate malnutrition in the context of acute presentation     Recommendations already ordered by Registered Dietitian (RD):  Ordered ensure max protein with lunch and dinner trays ( chocolate and vanilla)     Future/Additional Recommendations:  PO improvement        REASON FOR ASSESSMENT  Jenn Aparicio is a/an 66 year old female assessed by the dietitian for Provider Order - 67 y/o w/ hx lung cancer, here for COPD exacerbation, reports poor PO intake in the last weeks with her shortness of breath. Concern for poor appetite, malnutrition.    Chart reviewed:  PMH: Severe COPD (FEV1 24%) with baseline O2 need of 3 L,   - RLL adenocarcinoma ( lung cancer), s/p lobectomy (2016),   - RUL NSCLC s/p SBRT (2020), other pulmonary nodules, ROBERT nodule, new  - T2DM ( Last A1C 8.4 in 2022,  on admit). On Glipizide at home  - Admitted 22 for COPD exacerbation.        NUTRITION HISTORY  Obtained from chart review:   Patient presents with 3 weeks of shortness of breath, acutely worsening in the last 3 days in the setting of multiple environmental triggers and disjointed medication use due to pharmacy issues.     - Visited with patient today. Patient reports little to no PO intake over the past 3 days due to SOB and decrease in appetite. Patient notes, was choking on foods with ongoing SOB.    CURRENT NUTRITION ORDERS  Diet: Regular  Intake/Tolerance: patient notes, she just ordered a late lunch and was waiting for the meal tray to come. Patient notes, better appetite today. She is in agreement with a trial of ensure supplements to optimize PO intake.     LABS  BUN: 7 ( acute borderline low), Cr: 0.45 (L)  B (H)  Ketones urine: N/A   Lytes: normal range     MEDICATIONS  S/p methylprednisolone x1 in  "ED, Prednisone 40 mg PO daily x5 days (5/21 - )  Started on antibiotics (Zithromax) 500 mg x1 followed by 250 mg daily for 4 days  Insulin: injection at bedtime and TID  Bowel regimen    ANTHROPOMETRICS  Height: 0 cm (Data Unavailable) - 167.6 cm ( 5'6\")  Most Recent Weight: 95.7 kg (210 lb 15.7 oz)  Admit wt on 5/20/22  IBW: 59 kg ( 162% IBW)  BMI: 34.05 kg /m2  Obesity Grade I BMI 30-34.9  Weight History: 4.8% wt loss over the past 4 month   Wt Readings from Last 10 Encounters:   05/20/22 95.7 kg (210 lb 15.7 oz)   05/04/22 95.7 kg (211 lb)   03/31/22 95.7 kg (211 lb)   01/27/22 99.7 kg (219 lb 11.2 oz)   01/11/22 100.5 kg (221 lb 8 oz)   07/02/21 90.9 kg (200 lb 4.8 oz)   06/22/21 81.6 kg (180 lb)   03/30/21 90.7 kg (200 lb)   02/26/21 93.8 kg (206 lb 12.7 oz)   09/08/20 93.6 kg (206 lb 4.8 oz)       Dosing Weight: 68 kg adjusted wt from admit wt of 95.7 kg and IBW of 59 kg     ASSESSED NUTRITION NEEDS  Estimated Energy Needs: 1360- 1700 kcals/day (20 - 25 kcals/kg)  Justification: Maintenance and Obese  Estimated Protein Needs: 82- 102  grams protein/day (1.2 - 1.5 grams of pro/kg)  Justification: Hypercatabolism with acute illness  Estimated Fluid Needs: (1 mL/kcal)   Justification: Maintenance    PHYSICAL FINDINGS  See malnutrition section below.    MALNUTRITION  % Intake: </= 50% for >/= 5 days (severe)  % Weight Loss: Weight loss does not meet criteria  Subcutaneous Fat Loss: None observed  Muscle Loss: Temporal:Mild, Facial & jaw region:Mild and Upper arm (bicep, tricep):  Moderate  Fluid Accumulation/Edema: None noted  Malnutrition Diagnosis: Moderate malnutrition in the context of acute presentation     NUTRITION DIAGNOSIS  Inadequate oral intake related to SOB, hindering patient's ability to take sufficient PO as evidenced by patient report of 3 days minimal po PTA    INTERVENTIONS  Implementation  Nutrition Education: Reviewed oral nutrition supplements availabilities and encouraged intake when low po " and appetite.  Medical food supplement therapy : Ordered Ensure max BID     Goals  Patient to consume % of nutritionally adequate meal trays TID, or the equivalent with supplements/snacks.     Monitoring/Evaluation  Progress toward goals will be monitored and evaluated per protocol.    Celena leon ( weekend RD relief)  5C (BMT) RD pager: [phone]  Weekend/Holiday RD pager: [phone]

## 2022-05-21 NOTE — H&P
[facility]    History and Physical - [facility] Service       Date of Admission:  5/20/2022    Assessment & Plan      Jenn Aparicio is a 66 year old female with a history of severe COPD (FEV1 24%) with baseline O2 need of 3 L, RLL adenocarcinoma s/p lobectomy (2/2016), RUL NSCLC s/p SBRT (01/2020), other pulmonary nodules, HTN, T2DM, and tobacco use who is admitted 5/20/22 for COPD exacerbation.      # Dyspnea at rest and with exertion  # Hypoxia, improved  # Productive cough  # COPD, severe (FEV1 24%)  # Baseline O2 need of 3 LPM  # Non-cardiac cheat pain  # Tobacco use  66-year-old patient with history of severe COPD, lung cancer (see below) who presents with 3 weeks of shortness of breath, acutely worsening in the last 3 days in the setting of multiple environmental triggers and disjointed medication use due to pharmacy issues.  Patient had completed COPD exacerbation treatment with short course prednisone, doxycycline per pulmonologist Dr. Spann after appt on 5/4/2022.  Patient reported only modest improvement with this.  Environmental triggers include home mold, recent painting in the home, secondhand smoke in apartment building.  Last tobacco use over 4 weeks ago; does not smoke with worsened dyspnea.  Other associated symptoms include productive cough, intermittent dizziness, hypoxia down to the 60s-70s with exertion on baseline O2 of 3 LPM, CP.  Reports no fever, and with normal WBCs, negative COVID/flu/RSV, and lack of consolidation on imaging, high suspicion for isolated COPD exacerbation. Last hospitalized 6/22-6/25/2021 for the same.  Chest pain most likely noncardiac with admission ECG showing sinus tachycardia and troponin WNL, no improvement w/ EMS nitroglycerin spray.  Patient was denying further chest pain following neb treatments. Hypoxia improved in following EMS/ED treatment with x3 neb treatments, methylprednisolone.  Exacerbation  "appears to be moderate, will treat with oral meds/inhalers where appropriate.  - S/p magnesium sulfate in ED  - Inhalers/nebs   - Fluticasone-Umeclidin-Vilanterol (Trelegy) 1 puff daily    - Duoneb q6h scheduled per RT   - NaCl neb daily   - Levalbuterol 2 puff q6h PRN  - Steroid   - S/p methylprednisolone x1 in ED   - Prednisone 40 mg PO daily x5 days (5/21 - )  - Abx: azithromycin 500 mg x1 followed by 250 mg daily for 4 days  - Mucolytic: guaifenesin 1200 mg BID  - Flonase bilaterally daily  - Cetirizine 10 mg daily  - Nicotine replacement patches  - Continuous pulse ox  - VS q8h  - COPD consult, appreciate recs  - Smoking cession consult, appreciate assistance   - Pain: acetaminophen 650 mg q6h prn, ibuprofen 400 mg q6h PRN  - Daily BMP  - Daily CBC  - Discharge planning, future: Will be important establish reliable outpatient pharmacy for future refills to prevent readmission.    # NSCLC, adenocarcinoma, RUL  # RLL nodule  # ROBERT nodule, new  # S/p RL lobectomy 2016  From 3/31/22 oncology note: \"NSCLC, adeno, RUL: Now just over 2 years after SBRT. CT was personally reviewed. New RLL nodule is slightly longer. I'm still not sure whether it represents a malignancy or not. It would be difficult to bx. The ROBERT nodule is tiny. Both are still a little small for a PET to be useful.\" Plan was for repeat CT in 6 weeks (5/4/22), which demonstrated stable RUL nodule and stable ROBERT nodule but with \"new associated patchy irregular adjacent nodules and ill-defined opacities, unclear if cancer progression vs infection vs inflammatory.\" Oncologist Dr. Leung had called pt on 5/13 (left VM) to recommend repeat CT chest non-contrast in 6-8 weeks to follow up these findings, which are suspected to be inflammatory.   - Forward chart to Dr. Leung on discharge to prompt ordering of follow up imaging if not already done    # Tachycardia  Tachycardic in the setting of recent nebs.  ECG on admission showed sinus tach.  - Will " "utilized levalbuterol for PRN use (above) to mitigate tachycardia  - Continue to monitor    # Flank pain, right  Intermittent right-sided flank pain associated with COPD exacerbation, dyspnea associated chest pain.  Worse with exertion, relieved by rest.  No dysuria or hematuria on admission, though does note recent intermittent dysuria.  No history of nephrolithiasis.  - UA  - PTA cyclobenzaprine 5 mg TID PRN    # Poor PO intake  # Concern for malnutrition  Reports little to no PO intake in the days prior to admission, secondary to low appetite.  - add on mag, phos  - Nutrition consult  - Regular diet    # Deconditioning  In the setting of chronic illness.  - Fall precautions  - PT, OT consults     # T2DM  Last A1C 8.4 in 01/2022, up from 7.1 6 years ago.  on admission  - PTA glipizide 5 mg BID AC  - MSSI  - Hypoglycemia protocol  - Add on A1c    Chronic, stable:    # HTN  - PTA amlodipine 10 mg     # Allergic conjunctivitis  - olopatadine 1 drop bilaterally daily    # Dry eyes  - PEG drops 1 drop bilateral daily    # Suspected DAVID  Previously evaluated by Sleep (3/2021) with recommendation for lab sleep study given likely DAVID (STOP BANG 4/8). Has not yet had this evaluation.        Diet: Combination Diet Regular Diet Adult  DVT Prophylaxis: Enoxaparin (Lovenox) SQ  Andrade Catheter: Not present  Fluids: PO  Central Lines: None  Cardiac Monitoring: None  Code Status: Full Code    Clinically Significant Risk Factors Present on Admission                # Platelet Defect: home medication list includes an antiplatelet medication   # Diabetes, type II: last A1C 8.4 % (Ref range: 0.0 - 5.6 %)  # Obesity: Estimated body mass index is 34.05 kg/m  as calculated from the following:    Height as of 5/4/22: 1.676 m (5' 6\").    Weight as of this encounter: 95.7 kg (210 lb 15.7 oz).      Disposition Plan   Expected Discharge: 05/23/2022    Anticipated discharge location:  Awaiting care coordination huddle   Delays:   Once " COPD exacerbation treated       The patient's care was discussed with the Attending Physician, Dr. Boyer.    Gabi Alexander MD  [place] Medicine Service  [facility]  Securely message with the Vocera Web Console (learn more here)  Text page via [facility] Paging/Directory   Please see signed in provider for up to date coverage information    ______________________________________________________________________    Chief Complaint   SOB, hypoxia, dizziness    History is obtained from the patient and EMR.     History of Present Illness   Jenn Aparicio is a 66 year old female with a history of severe COPD (FEV1 24%) with baseline O2 need of 3 L, RLL adenocarcinoma s/p lobectomy (2/2016), RUL NSCLC s/p SBRT (01/2020), other pulmonary nodules, HTN, T2DM, and tobacco use who presented to the ED via EMS for evaluation of shortness of breath, hypoxia, and dizziness.    Patient had called the pulmonology nurse line day of admission complaining of 3-4 days of shortness of breath, low oxygen saturations and dizziness in the setting of recent home chemical exposures - she has mold in her bathroom ceiling and a repair person had come to fix, had spray painted ceiling despite pt previously requesting this type of work not be done when she is home.  She has also not been able to get recently prescribed Trellegy inhaler. She had noted her O2 sats to fall to the 60s at times despite being on home O2 3-4 LPM.  She had taken doxycycline and prednisone prescribed following 5/4/22 pulmonology appointment without improvement.  She was recommended to present to the ED.    At this time, patient endorses improvement in symptoms following x3 neb treatments, steroid treatment in ED.  She had reported chest pain on ED arrival; experiences this commonly with COPD exacerbations, says pain is worse with exertional dyspnea, resolves with rest, only present when breathing is bad.  She had also  "endorsed right-sided flank vs back pain that is similarly worsened with dyspnea, not currently bothering her.  On review of systems, notes she had a slight rash on her arms following home chemical exposure but this seems to be improving.  Denies fever.     She lives in an apartment complex alone.  Has family in the [facility] who check in on her.  Looking to move out of this facility due to environmental exposure concerns.  Has not smoked cigarettes in several weeks in the setting of her worsening dyspnea; prior to that had smoked 1 to 2 cigarettes daily.    Patient is hoping to be well enough to attend grandsons graduation in early June.  She requests that her daughter Kimberly be updated on her condition.    ED course:   Tachycardic in 120s, intermittent tachypnea, increased O2 need to 4 LPM NC, K of 3.2, normal WBC, D-dimer and troponin WNL, negative flu, COVID, RSV.    - ECG with sinus tachycardia, questionable downstroke delta wave in lead II  - CXR without new focal airspace opacity, though did note \"Persistent reticular nodular opacities in the left midlung and reticular opacities in the peripheral right upper lobe\" as well as \"Unchanged blunting of the right costophrenic angle corresponding to  pleural thickening on comparison CT.\"   - POCUS cardiac per ED provider demonstrated EF grossly preserved, RV and LV equal in size, no pericardial effusion was found.  IVC visualized and findings indicate normovolemia  -CT PE negative for acute pulmonary embolism  Interventions: EMS - nitroglycerin spray (no CP relief), 324 mg ASA, Duoneb x1, albuterol neb x1. ED - Duoneb x1, methylprednisolone 125 mg x1, magnesium x1.    Review of Systems    ROS also positive for intermittent dysuria, feeling clammy several days ago.    Denies fever, headache, acute vision changes, sore throat, abdominal pain, nausea vomiting, constipation, lower extremity swelling, weakness.    The 10 point Review of Systems is negative other than " noted in the HPI or here.    Past Medical History    I have reviewed this patient's medical history and updated it with pertinent information if needed.   Past Medical History:   Diagnosis Date     Adenocarcinoma, lung (H)      Asthma      Ectopic pregnancy      Pulmonary emphysema (H)     Very severe FEV1<30% predicted     Type II diabetes mellitus (H)         Past Surgical History   I have reviewed this patient's surgical history and updated it with pertinent information if needed.  Past Surgical History:   Procedure Laterality Date     2/8/16                R thoracotomy, RLL lobectomy (Dr. Cunha). Adenocarcinoma, 1.1 cm, assoicated with atypical adenomatous hyperplasia Right 02/08/2016    R thoracotomy, RLL lobectomy (Dr. Cunha). Adenocarcinoma, 1.1 cm, assoicated with atypical adenomatous hyperplasia        Social History   I have reviewed this patient's social history and updated it with pertinent information if needed. Jenn Aparicio  reports that she has been smoking cigarettes. She has been smoking about 0.25 packs per day. She has quit using smokeless tobacco. She reports current alcohol use. She reports that she does not use drugs.    Family History   I have reviewed this patient's family history and updated it with pertinent information if needed.  Family History   Problem Relation Age of Onset     Depression Daughter      Alcohol/Drug Other         self     Diabetes Other         self     Thyroid Disease Other         self     Asthma Other         self       Prior to Admission Medications   Prior to Admission Medications   Prescriptions Last Dose Informant Patient Reported? Taking?   Alpha-Lipoic Acid 300 MG CAPS 5/20/2022 at Unknown time  No Yes   Sig: Take 300 mg by mouth daily   Continuous Blood Gluc Sensor (FREESTYLE GIOVANNI 14 DAY SENSOR) MISC Unknown at Unknown time  No Yes   Sig: Change every 14 days.   Easy Comfort Lancets MISC Past Week at Unknown time  Yes Yes   Fluticasone-Umeclidin-Vilanterol  (TRELEGY ELLIPTA) 200-62.5-25 MCG/INH oral inhaler 5/20/2022 at Unknown time  No Yes   Sig: Inhale 1 puff into the lungs daily   Lancet Devices (EASY MINI EJECT LANCING DEVICE) MISC Past Week at Unknown time  Yes Yes   Omega-3-Acid Eth Est, Dietary, 1 g CAPS Past Week at Unknown time  No Yes   Sig: Take 2 capfuls by mouth 2 times daily   albuterol (PROAIR HFA/PROVENTIL HFA/VENTOLIN HFA) 108 (90 Base) MCG/ACT inhaler 5/20/2022 at Unknown time  No Yes   Sig: INHALE 1 OR 2 PUFFS BY MOUTH EVERY 4 HOURS AS NEEDED   alcohol swab prep pads Unknown at Unknown time  No Yes   Sig: Use to swab area of injection/xander as directed.   amLODIPine (NORVASC) 10 MG tablet Past Week at Unknown time  No Yes   Sig: Take 1 tablet (10 mg) by mouth daily   aspirin 81 MG EC tablet 5/20/2022 at Unknown time  No Yes   Sig: Take 1 tablet (81 mg) by mouth daily   benzonatate (TESSALON) 200 MG capsule Past Week at Unknown time  No Yes   Sig: Take 1 capsule (200 mg) by mouth 3 times daily as needed for cough   cetirizine (ZYRTEC) 10 MG tablet Past Month at Unknown time  No Yes   Sig: Take 1 tablet (10 mg) by mouth daily   coenzyme Q-10 (CO-Q10) 50 MG capsule 5/20/2022 at Unknown time  No Yes   Sig: Take 1 capsule (50 mg) by mouth daily   cyclobenzaprine (FLEXERIL) 5 MG tablet 5/20/2022 at Unknown time  No Yes   Sig: Take 1 tablet (5 mg) by mouth 3 times daily as needed for muscle spasms   doxycycline hyclate (VIBRAMYCIN) 100 MG capsule Past Month at Unknown time  No Yes   Sig: Take 100 mg twice daily for five days if needed for treatment of a COPD exacerbation. Use alongside prednisone.   fluticasone (FLONASE) 50 MCG/ACT nasal spray Past Week at Unknown time  No Yes   Sig: Spray 1 spray into both nostrils daily Use at night before bed   fluticasone-vilanterol (BREO ELLIPTA) 200-25 MCG/INH inhaler 5/20/2022 at Unknown time  No Yes   Sig: Inhale 1 puff into the lungs daily   glipiZIDE (GLUCOTROL) 5 MG tablet 5/20/2022 at Unknown time  No Yes   Sig:  Take 1 tablet (5 mg) by mouth 2 times daily (before meals)   glucosamine-chondroitin 500-400 MG tablet Past Week at Unknown time  No Yes   Sig: Take 1 tablet by mouth daily   ipratropium - albuterol 0.5 mg/2.5 mg/3 mL (DUONEB) 0.5-2.5 (3) MG/3ML neb solution 5/20/2022 at Unknown time  No Yes   Sig: Take 1 vial (3 mLs) by nebulization every 6 hours as needed for shortness of breath / dyspnea or wheezing   ketotifen (ZADITOR) 0.025 % ophthalmic solution Past Week at Unknown time  Yes Yes   Sig: INSTILL 1 DROP IN BOTH EYES TWICE DAILY AS NEEDED   multivitamin w/minerals (THERA-VIT-M) tablet 5/20/2022 at Unknown time  No Yes   Sig: Take 1 tablet by mouth daily   nicotine (SM NICOTINE) 14 MG/24HR 24 hr patch 5/20/2022 at Unknown time  No Yes   Sig: Place 1 patch onto the skin every 24 hours   olopatadine (PATADAY) 0.2 % ophthalmic solution 5/20/2022 at Unknown time  No Yes   Sig: Place 1 drop into both eyes daily   omega-3 acid ethyl esters (LOVAZA) 1 g capsule Past Week at Unknown time  Yes Yes   polyethylene glycol-propylene glycol (SYSTANE) 0.4-0.3 % SOLN ophthalmic solution 5/20/2022 at Unknown time  No Yes   Sig: Place 1 drop into both eyes 4 times daily   predniSONE (DELTASONE) 20 MG tablet Past Month at Unknown time  No Yes   Sig: Take 40 mg daily for 5 days if needed for treatment of a COPD exacerbation, use alongside doxycycline   sodium chloride (NEBUSAL) 3 % neb solution 5/20/2022 at Unknown time  No Yes   Sig: Take 3 mLs by nebulization daily Use 1-2 times daily in combination with Duoneb and Aerobika to help with mucus clearance   umeclidinium (INCRUSE ELLIPTA) 62.5 MCG/INH inhaler 5/20/2022 at Unknown time  No Yes   Sig: Inhale 1 puff into the lungs daily      Facility-Administered Medications: None     Allergies   Allergies   Allergen Reactions     Interferons Dermatitis     Penicillins Hives     Aspirin      325mg      Colon Care        Physical Exam   Vital Signs:     BP: (!) 150/67 Pulse: 98   Resp: 18  SpO2: 95 % O2 Device: Nasal cannula Oxygen Delivery: 3 LPM  Weight: 210 lbs 15.68 oz    Physical Exam  Constitutional:       General: She is not in acute distress.     Appearance: Normal appearance. She is not diaphoretic.   HENT:      Head: Normocephalic and atraumatic.      Mouth/Throat:      Mouth: No oral lesions.      Dentition: Abnormal dentition. Dental caries present.      Pharynx: Oropharynx is clear.   Eyes:      Extraocular Movements: Extraocular movements intact.      Conjunctiva/sclera: Conjunctivae normal.   Cardiovascular:      Rate and Rhythm: Regular rhythm. Tachycardia present.      Pulses:           Radial pulses are 2+ on the right side and 2+ on the left side.        Posterior tibial pulses are 2+ on the right side and 2+ on the left side.      Heart sounds: Normal heart sounds.   Pulmonary:      Effort: Pulmonary effort is normal. No respiratory distress.      Breath sounds: Wheezing (throughout lung fields) and rhonchi (intermittent, bibasilar) present. No rales.   Abdominal:      General: Bowel sounds are normal.      Palpations: Abdomen is soft.      Tenderness: There is no abdominal tenderness.   Musculoskeletal:         General: No tenderness or deformity.      Cervical back: No muscular tenderness.      Right lower leg: Edema (mild) present.      Left lower leg: Edema (mild) present.   Lymphadenopathy:      Cervical: No cervical adenopathy.   Skin:     General: Skin is warm and dry.      Findings: No lesion or rash.   Neurological:      General: No focal deficit present.      Mental Status: She is alert and oriented to person, place, and time. Mental status is at baseline.   Psychiatric:         Mood and Affect: Mood normal.         Behavior: Behavior normal.         Data   Data reviewed today: I reviewed all medications, new labs and imaging results over the last 24 hours. I personally reviewed the EKG tracing showing sinus tachycardia.    Recent Labs   Lab 05/20/22  4876   WBC 8.7   HGB  14.0   MCV 90         POTASSIUM 3.2*   CHLORIDE 101   CO2 31   BUN 7   CR 0.46*   ANIONGAP 7   LUIGI 9.4   *   ALBUMIN 3.6   PROTTOTAL 7.7   BILITOTAL 0.3   ALKPHOS 98   ALT 18   AST 8     Recent Results (from the past 24 hour(s))   POC US ECHO LIMITED    Impression    [facility] [place] Procedure Note      Limited Bedside ED Cardiac Ultrasound:    PROCEDURE: PERFORMED BY: Dr. Kimi Lewis MD  INDICATIONS/SYMPTOM:  Chest Pain and Shortness of Breath  PROBE: Cardiac phased array probe  BODY LOCATION: Chest  FINDINGS:   The ultrasound was performed utilizing the subcostal and parasternal long axis views.  Cardiac contractility:  Present  Gross estimation of cardiac kinesis: normal  Pericardial Effusion:  None  RV:LV ratio: Equal  IVC:    Diameter:  IVC diameter expiration (IVCe) <2 cm                                                   IVC diameter inspiration (IVCi)  <2 cm                                                       Collapsibility:  IVC collapses < 50% with inspiration  INTERPRETATION:    EF grossly preserved, RV and LV equal in size,  No pericardial effusion was found.  IVC visualized and findings indicate normovolemia.  IMAGE DOCUMENTATION: Images were archived to PACs system.         Chest XR,  PA & LAT    Narrative    EXAMINATION: XR CHEST 2 VW, 5/20/2022 7:25 PM    INDICATION: shortness of breath    COMPARISON: 5/4/2022    FINDINGS: PA and lateral upright views of the chest.     Surgical clips within the mediastinum. Trachea is midline. The  cardiomediastinal silhouette is within normal limits. Unchanged  blunting of the right costophrenic angle. No pneumothorax.  Emphysematous change. Persistent reticular nodular opacities in the  left midlung and reticular opacities in the peripheral right upper  lobe. No new focal airspace opacity.    The partially visualized upper abdomen is unremarkable. No acute  osseous abnormality. Demineralized appearance. Soft tissue is within  normal  limits.      Impression    IMPRESSION:  1. Persistent reticular nodular opacities in the left midlung and  reticular opacities in the peripheral right upper lobe, better seen on  comparison CT. No new focal airspace opacity.  2. Unchanged blunting of the right costophrenic angle corresponding to  pleural thickening on comparison CT.         I have personally reviewed the examination and initial interpretation  and I agree with the findings.    LUIS ANTONIO MEDEL DO         SYSTEM ID:  [id number]   CT Chest Pulmonary Embolism w Contrast    Narrative    EXAMINATION: CTA pulmonary angiogram, 5/20/2022 9:03 PM     COMPARISON: Chest x-ray 5/20/2022, CT chest abdomen pelvis 5/4/2022,  chest CT 3/31/2022    HISTORY: PE suspected, high probability    Additional history from the electronic medical record: Patient has  history of non-small cell lung cancer. Adenocarcinoma of the right  lung.    TECHNIQUE: Volumetric helical acquisition of CT images of the chest  from the lung apices to the kidneys were acquired after the  administration of 80 mL of Isovue-370 IV contrast. Flash technique  with free breathing acquisition.  Post-processed multiplanar and/or  MIP reformations were obtained, archived to PACS and used in  interpretation of this study.     FINDINGS:  Contrast bolus is: excellent.  Exam is negative for acute  pulmonary embolism.    The largest right ventricle transaxial diameter is (measured from  endocardium to endocardium): 3.4 cm   The largest left ventricle transaxial diameter is (measured from  endocardium to endocardium): 3.2 cm  RV/LV ratio is: 1.1 (if ratio greater than 1.1 then sign is suspicious  for right heart strain)  Reflux of contrast into the IVC? no  Paradoxical bowing of the interventricular septum to the left? no  Pericardial effusion?:not present    The central tracheobronchial tree is patent. Stable solid 6 mm nodule  in the posterior subpleural right upper lobe (series 8 image 94)  surrounded by  reticular opacities, 5 mm nodule in the posterior right  upper lobe (series 8 image 112), and 9 mm nodule just inferior (series  8 image 122). Increased clustered nodularity in the posterior left  upper lobe. No pleural effusion or pneumothorax. Endobronchial mucous  plugging in the left lower lobe. Apically predominant centrilobular  and paraseptal emphysema. Right lower lobectomy changes.    Normal heart size. No pericardial effusion. Normal caliber ascending  aorta and main pulmonary artery. Slightly reduced mediastinal lymph  nodes. For example 9 mm lymph node in the AP window, previously 1 cm  (series 7 image 102).    Upper abdomen: Limited. No acute pathology.    Bones: No acute or suspicious osseous lesions.      Impression    IMPRESSION:   1. Exam is negative for acute pulmonary embolism.    2. Slightly increased (since 5/4/2022) clustered nodularity in the  posterior left upper lobe, favored to be infectious/inflammatory given  absence of any similar finding on 3/31/2022, though malignancy remains  a consideration. Stable right upper lobe pulmonary nodules. Recommend  attention on short-term follow-up.    In the event of a positive result for acute pulmonary embolism  resulting in right heart strain, please activate the [facility]  Multidisciplinary group for consultation by paging [phone]).     [facility] -- Pulmonary Embolism Response Team (Multidisciplinary team  including cardiology, interventional radiology, critical care,  hematology)    I have personally reviewed the examination and initial interpretation  and I agree with the findings.    LUIS ANTONIO MEDEL DO         SYSTEM ID:  [id number]

## 2022-05-21 NOTE — PROGRESS NOTES
Sepsis Evaluation Progress Note    I was called to see Jenn Aparicio due to abnormal vital signs triggering the Sepsis SIRS screening alert. She is not known to have an infection.     Physical Exam   Vital Signs:  Temp: 98.5  F (36.9  C) Temp src: Oral BP: (!) 144/79 Pulse: 107   Resp: 24 SpO2: 100 % O2 Device: Nasal cannula Oxygen Delivery: 3 LPM     General: chronically ill appearing  Mental Status: AAOx4.     Remainder of physical exam is significant for end expiratory wheezing throughout lung mendoza. Bibasilar crackles present.     Data   Lactic Acid   Date Value Ref Range Status   05/21/2022 2.5 (H) 0.7 - 2.0 mmol/L Final   02/25/2021 1.9 0.7 - 2.0 mmol/L Final   02/24/2021 3.7 (H) 0.7 - 2.0 mmol/L Final     Lactate for Sepsis Protocol   Date Value Ref Range Status   02/24/2021 5.3 (HH) 0.7 - 2.0 mmol/L Final     Comment:     Value above critical limit  Critical Value called to and read back by  JOHANN CARPIO ON 02/24/2021 AT 1138 BY TG         Assessment & Plan   NO EVIDENCE OF SEPSIS at this time.  Vital sign, physical exam, and lab findings are due to albuterol nebulizer treatment overnight.    Disposition: The patient will remain on the current unit. We will continue to monitor this patient closely..  Aster Elias MD    Sepsis Criteria   Sepsis: 2+ SIRS criteria due to infection  Severe Sepsis: Sepsis AND 1+ new sign of acute organ dysfunction (Note: lactate >2 or acute encephalopathy each qualify as organ dysfunction)  Septic Shock: Sepsis AND hypotension despite volume resuscitation with 30 ml/kg crystalloid or lactate >=4  Note: HYPOTENSION is defined as 2 BP readings measured 3 hrs apart that have a SBP <90, MAP <65, or decrease >40 mmHg, occurring 6 hrs before or after t-zero

## 2022-05-21 NOTE — PROVIDER NOTIFICATION
Patient came to the unit from ED and alert the sepsis protocol. Lactic acid 2.5, RRT and primary care team were notified.

## 2022-05-21 NOTE — PROVIDER NOTIFICATION
Nurse paged the [facility] team for pain control orders and waiting for pain medications.

## 2022-05-21 NOTE — PROGRESS NOTES
"   05/21/22 0946   Quick Adds   Type of Visit Initial PT Evaluation       Present no   Living Environment   People in Home alone   Current Living Arrangements apartment   Home Accessibility no concerns   Transportation Anticipated family or friend will provide   Living Environment Comments Reports she lives in a 1st floor apartment alone. No JASON. Reports her daughter can provide intermittent assistance but not consistent.   Self-Care   Usual Activity Tolerance moderate   Current Activity Tolerance poor   Regular Exercise Yes   Activity/Exercise Type walking   Equipment Currently Used at Home walker, rolling;cane, straight   Fall history within last six months yes   Number of times patient has fallen within last six months 2   Activity/Exercise/Self-Care Comment IND with ADL's and mobility with 4WW. Also owns a cane. Regular exercise of walking. Reports a decrease in endurance recently and unable to ambulate 10ft without needing to sit down. 2 falls reported in last 6 months.   General Information   Onset of Illness/Injury or Date of Surgery 05/21/22   Referring Physician Gabi Alexander MD   Patient/Family Therapy Goals Statement (PT) to return home and attend grandson's graduation   Pertinent History of Current Problem (include personal factors and/or comorbidities that impact the POC) per EMR: \"Jenn Aparicio is a 66 year old female with a history of severe COPD (FEV1 24%) with baseline O2 need of 3 L, RLL adenocarcinoma s/p lobectomy (2/2016), RUL NSCLC s/p SBRT (01/2020), other pulmonary nodules, HTN, T2DM, and tobacco use who is admitted 5/20/22 for COPD exacerbation. \"   Existing Precautions/Restrictions fall;oxygen therapy device and L/min   Weight-Bearing Status - LUE full weight-bearing   Weight-Bearing Status - RUE full weight-bearing   Weight-Bearing Status - LLE full weight-bearing   Cognition   Orientation Status (Cognition) oriented x 4   Pain Assessment   Patient Currently in " Pain Yes, see Vital Sign flowsheet   Integumentary/Edema   Integumentary/Edema no deficits were identifed   Posture    Posture Forward head position;Protracted shoulders   Range of Motion (ROM)   Range of Motion ROM is WFL   Strength (Manual Muscle Testing)   Strength (Manual Muscle Testing) strength is WFL   Strength Comments   (generalized weakness but grossly 3+/5 in B LE)   Bed Mobility   Comment, (Bed Mobility) supine <> sit with HOB elevated and SBA   Transfers   Comment, (Transfers) sit <> stand with CGA and no AD   Gait/Stairs (Locomotion)   Comment, (Gait/Stairs) Unable to assess due to safety concerns with O2; will assess at later time   Balance   Balance Comments fair standing balance   Sensory Examination   Sensory Perception other (describe)   Sensory Perception Comments   (neuropathy in B feet)   Coordination   Coordination no deficits were identified   Muscle Tone   Muscle Tone no deficits were identified   Clinical Impression   Criteria for Skilled Therapeutic Intervention Yes, treatment indicated   PT Diagnosis (PT) impaired functional mobility, impaired activity tolerance   Influenced by the following impairments decreased strength, decreased endurance   Functional limitations due to impairments difficulty with bed mobility, transfers, and walking   Clinical Presentation (PT Evaluation Complexity) Stable/Uncomplicated   Clinical Presentation Rationale per clinical judgment   Clinical Decision Making (Complexity) low complexity   Planned Therapy Interventions (PT) balance training;bed mobility training;home exercise program;gait training;motor coordination training;neuromuscular re-education;patient/family education;ROM (range of motion);stair training;strengthening;stretching;transfer training;progressive activity/exercise;risk factor education;home program guidelines   Anticipated Equipment Needs at Discharge (PT)   (none)   Risk & Benefits of therapy have been explained evaluation/treatment  results reviewed;care plan/treatment goals reviewed;risks/benefits reviewed;current/potential barriers reviewed;participants voiced agreement with care plan;participants included;patient   PT Discharge Planning   PT Discharge Recommendation (DC Rec) home with assist;home with home care physical therapy;Transitional Care Facility   PT Rationale for DC Rec Pt is demonstrating functional mobility below baseline with greatest impairment being decreased endurance/activity tolerance. Pt is an increased risk for falls 2/2 fatigue and low endurance. If d/c home, pt will require increased assistance for chores for energy conservation and overall safety with ADL's and mobility as well as HH PT. If unable to increase assist level for energy conservation, pt will benefit from short TCU stay to improve activity tolerance prior to returning home. Will continue to assess and update as appropriate but agreeable to both options.   PT Brief overview of current status Ax1 with gait belt for transfers   Total Evaluation Time   Total Evaluation Time (Minutes) 5   Physical Therapy Goals   PT Frequency 6x/week   PT Predicted Duration/Target Date for Goal Attainment 05/28/22   PT: Bed Mobility Independent;Supine to/from sit;Rolling;Bridging   PT: Transfers Modified independent;Sit to/from stand;Bed to/from chair  (with 4WW)   PT: Gait Modified independent;100 feet  (with LRAD and supplemental O2)   PT: Perform aerobic activity with stable cardiovascular response intermittent activity;continuous activity;15 minutes;ambulation;NuStep   PT: Goal 1 IND with HEP for B LE to improve strength for transfers and walking     Eval completed in ED    Zee Hanna, PT

## 2022-05-21 NOTE — PROGRESS NOTES
Assumed care of patient. She states she is more comfortable than when she first came in, however she still feels SOB. MD in room to do bedside U/S. Meds ordered, VSS, will continue to monitor.

## 2022-05-21 NOTE — PLAN OF CARE
BP (!) 147/72 (BP Location: Right arm)   Pulse 102   Temp 98.8  F (37.1  C) (Oral)   Resp 20   Wt 95.7 kg (210 lb 15.7 oz)   SpO2 96%   BMI 34.05 kg/m    Left PIV is patent and saline locked. 2-3 L NC and SpO2 is upper 90's with RUSSELL, increased shortness of breath with physical movements. Patient is A & O x 4. Patient continue to c/o mid chest pain due to two weeks of coughing. Primary care team was notified about the patient's status. Patient received Tessalon x 1, flexeril x 1, Motrin x 1, Pepcid x 1 and applied Lid patch on the site with some relief. MD order 12 lead EKG showed Sinus tachycardiac and troponin level is 5, urine culture x 1 send. Patient is eating and drinking good. Blood glucose checked x 2 with sliding scale correction. Patient receives schedule Neb treatments with some improvements. Lung sounds wheezing and diminished on the base. Right arm, nicotine patch. Patient has skin rash(s) on her forearm and she does not what cause it. Telemetry is shows sinus tachycardia. Continue with plan of care and notify MD for status changes.     Problem: Plan of Care - These are the overarching goals to be used throughout the patient stay.    Goal: Plan of Care Review/Shift Note  Description: The Plan of Care Review/Shift note should be completed every shift.  The Outcome Evaluation is a brief statement about your assessment that the patient is improving, declining, or no change.  This information will be displayed automatically on your shift note.  Outcome: Ongoing, Progressing  Flowsheets (Taken 5/21/2022 1750)  Plan of Care Reviewed With: patient  Overall Patient Progress: no change     Problem: Plan of Care - These are the overarching goals to be used throughout the patient stay.    Goal: Absence of Hospital-Acquired Illness or Injury  Intervention: Identify and Manage Fall Risk  Recent Flowsheet Documentation  Taken 5/21/2022 1004 by Sophie Rivera RN  Safety Promotion/Fall Prevention:    activity supervised   bed alarm on   fall prevention program maintained   increased rounding and observation   increase visualization of patient   lighting adjusted   mobility aid in reach   clutter free environment maintained   nonskid shoes/slippers when out of bed   patient and family education   room organization consistent   safety round/check completed     Problem: Plan of Care - These are the overarching goals to be used throughout the patient stay.    Goal: Absence of Hospital-Acquired Illness or Injury  Intervention: Prevent Skin Injury  Recent Flowsheet Documentation  Taken 5/21/2022 1004 by Sophie Rivera RN  Body Position:   position changed independently   foot of bed elevated   heels elevated   legs elevated     Problem: Plan of Care - These are the overarching goals to be used throughout the patient stay.    Goal: Absence of Hospital-Acquired Illness or Injury  Intervention: Prevent and Manage VTE (Venous Thromboembolism) Risk  Recent Flowsheet Documentation  Taken 5/21/2022 1004 by Sophie Rivera RN  VTE Prevention/Management: SCDs (sequential compression devices) off  Activity Management:   activity adjusted per tolerance   activity encouraged     Problem: Plan of Care - These are the overarching goals to be used throughout the patient stay.    Goal: Absence of Hospital-Acquired Illness or Injury  Intervention: Prevent Infection  Recent Flowsheet Documentation  Taken 5/21/2022 1004 by Sophie Rivera RN  Infection Prevention:   cohorting utilized   environmental surveillance performed   equipment surfaces disinfected   hand hygiene promoted     Problem: Plan of Care - These are the overarching goals to be used throughout the patient stay.    Goal: Optimal Comfort and Wellbeing  Intervention: Monitor Pain and Promote Comfort  Recent Flowsheet Documentation  Taken 5/21/2022 1004 by Sophie Rivera RN  Pain Management Interventions: MD notified (comment)   Goal Outcome  Evaluation:    Plan of Care Reviewed With: patient     Overall Patient Progress: no change

## 2022-05-21 NOTE — PROGRESS NOTES
Patient admitted to: 5 C .   Admitted from: ED.   Arrived by: Wheelchair.   Reason for admission: Chest pain and Hypoxia.   Patient accompanied by: Just the patient with the hospital transporter.   Belongings: With the patient in the room.   Teaching: Done by RN and patient verbalized understanding of the teaching.   Skin double check completed by: Sophie SANTOS RN with Cleopatra EMANUEL RN.

## 2022-05-22 ENCOUNTER — APPOINTMENT (OUTPATIENT)
Dept: GENERAL RADIOLOGY | Facility: CLINIC | Age: 67
DRG: 190 | End: 2022-05-22
Attending: STUDENT IN AN ORGANIZED HEALTH CARE EDUCATION/TRAINING PROGRAM
Payer: COMMERCIAL

## 2022-05-22 LAB
ANION GAP SERPL CALCULATED.3IONS-SCNC: 9 MMOL/L (ref 3–14)
BASE EXCESS BLDV CALC-SCNC: 6.7 MMOL/L (ref -7.7–1.9)
BUN SERPL-MCNC: 19 MG/DL (ref 7–30)
C PNEUM DNA SPEC QL NAA+PROBE: NOT DETECTED
CALCIUM SERPL-MCNC: 9.3 MG/DL (ref 8.5–10.1)
CHLORIDE BLD-SCNC: 103 MMOL/L (ref 94–109)
CO2 SERPL-SCNC: 28 MMOL/L (ref 20–32)
CREAT SERPL-MCNC: 0.48 MG/DL (ref 0.52–1.04)
CRP SERPL-MCNC: <2.9 MG/L (ref 0–8)
ERYTHROCYTE [DISTWIDTH] IN BLOOD BY AUTOMATED COUNT: 13 % (ref 10–15)
FLUAV H1 2009 PAND RNA SPEC QL NAA+PROBE: NOT DETECTED
FLUAV H1 RNA SPEC QL NAA+PROBE: NOT DETECTED
FLUAV H3 RNA SPEC QL NAA+PROBE: NOT DETECTED
FLUAV RNA SPEC QL NAA+PROBE: NOT DETECTED
FLUBV RNA SPEC QL NAA+PROBE: NOT DETECTED
GFR SERPL CREATININE-BSD FRML MDRD: >90 ML/MIN/1.73M2
GLUCOSE BLD-MCNC: 235 MG/DL (ref 70–99)
GLUCOSE BLDC GLUCOMTR-MCNC: 212 MG/DL (ref 70–99)
GLUCOSE BLDC GLUCOMTR-MCNC: 242 MG/DL (ref 70–99)
GLUCOSE BLDC GLUCOMTR-MCNC: 394 MG/DL (ref 70–99)
GLUCOSE BLDC GLUCOMTR-MCNC: 499 MG/DL (ref 70–99)
HADV DNA SPEC QL NAA+PROBE: NOT DETECTED
HCO3 BLDV-SCNC: 32 MMOL/L (ref 21–28)
HCOV PNL SPEC NAA+PROBE: NOT DETECTED
HCT VFR BLD AUTO: 41.3 % (ref 35–47)
HGB BLD-MCNC: 12.9 G/DL (ref 11.7–15.7)
HMPV RNA SPEC QL NAA+PROBE: NOT DETECTED
HPIV1 RNA SPEC QL NAA+PROBE: NOT DETECTED
HPIV2 RNA SPEC QL NAA+PROBE: NOT DETECTED
HPIV3 RNA SPEC QL NAA+PROBE: NOT DETECTED
HPIV4 RNA SPEC QL NAA+PROBE: NOT DETECTED
LACTATE SERPL-SCNC: 3 MMOL/L (ref 0.7–2)
LACTATE SERPL-SCNC: 3.5 MMOL/L (ref 0.7–2)
LACTATE SERPL-SCNC: 3.9 MMOL/L (ref 0.7–2)
M PNEUMO DNA SPEC QL NAA+PROBE: NOT DETECTED
MAGNESIUM SERPL-MCNC: 2.1 MG/DL (ref 1.6–2.3)
MCH RBC QN AUTO: 28.2 PG (ref 26.5–33)
MCHC RBC AUTO-ENTMCNC: 31.2 G/DL (ref 31.5–36.5)
MCV RBC AUTO: 90 FL (ref 78–100)
O2/TOTAL GAS SETTING VFR VENT: 2 %
PCO2 BLDV: 48 MM HG (ref 40–50)
PH BLDV: 7.43 [PH] (ref 7.32–7.43)
PLATELET # BLD AUTO: 236 10E3/UL (ref 150–450)
PO2 BLDV: 44 MM HG (ref 25–47)
POTASSIUM BLD-SCNC: 3.8 MMOL/L (ref 3.4–5.3)
PROCALCITONIN SERPL-MCNC: <0.05 NG/ML
RBC # BLD AUTO: 4.58 10E6/UL (ref 3.8–5.2)
RSV RNA SPEC QL NAA+PROBE: NOT DETECTED
RSV RNA SPEC QL NAA+PROBE: NOT DETECTED
RV+EV RNA SPEC QL NAA+PROBE: NOT DETECTED
S PNEUM AG SPEC QL: NEGATIVE
SODIUM SERPL-SCNC: 140 MMOL/L (ref 133–144)
WBC # BLD AUTO: 12.9 10E3/UL (ref 4–11)

## 2022-05-22 PROCEDURE — 250N000009 HC RX 250: Performed by: NURSE PRACTITIONER

## 2022-05-22 PROCEDURE — 84145 PROCALCITONIN (PCT): CPT | Performed by: STUDENT IN AN ORGANIZED HEALTH CARE EDUCATION/TRAINING PROGRAM

## 2022-05-22 PROCEDURE — 83735 ASSAY OF MAGNESIUM: CPT | Performed by: STUDENT IN AN ORGANIZED HEALTH CARE EDUCATION/TRAINING PROGRAM

## 2022-05-22 PROCEDURE — 83605 ASSAY OF LACTIC ACID: CPT | Performed by: STUDENT IN AN ORGANIZED HEALTH CARE EDUCATION/TRAINING PROGRAM

## 2022-05-22 PROCEDURE — 250N000009 HC RX 250: Performed by: STUDENT IN AN ORGANIZED HEALTH CARE EDUCATION/TRAINING PROGRAM

## 2022-05-22 PROCEDURE — 71045 X-RAY EXAM CHEST 1 VIEW: CPT | Mod: 26 | Performed by: RADIOLOGY

## 2022-05-22 PROCEDURE — 250N000012 HC RX MED GY IP 250 OP 636 PS 637: Performed by: STUDENT IN AN ORGANIZED HEALTH CARE EDUCATION/TRAINING PROGRAM

## 2022-05-22 PROCEDURE — 250N000013 HC RX MED GY IP 250 OP 250 PS 637: Performed by: STUDENT IN AN ORGANIZED HEALTH CARE EDUCATION/TRAINING PROGRAM

## 2022-05-22 PROCEDURE — 83605 ASSAY OF LACTIC ACID: CPT | Performed by: NURSE PRACTITIONER

## 2022-05-22 PROCEDURE — 250N000011 HC RX IP 250 OP 636: Performed by: STUDENT IN AN ORGANIZED HEALTH CARE EDUCATION/TRAINING PROGRAM

## 2022-05-22 PROCEDURE — 36415 COLL VENOUS BLD VENIPUNCTURE: CPT | Performed by: STUDENT IN AN ORGANIZED HEALTH CARE EDUCATION/TRAINING PROGRAM

## 2022-05-22 PROCEDURE — 85027 COMPLETE CBC AUTOMATED: CPT | Performed by: STUDENT IN AN ORGANIZED HEALTH CARE EDUCATION/TRAINING PROGRAM

## 2022-05-22 PROCEDURE — 87899 AGENT NOS ASSAY W/OPTIC: CPT | Performed by: NURSE PRACTITIONER

## 2022-05-22 PROCEDURE — 71045 X-RAY EXAM CHEST 1 VIEW: CPT

## 2022-05-22 PROCEDURE — 87581 M.PNEUMON DNA AMP PROBE: CPT | Performed by: NURSE PRACTITIONER

## 2022-05-22 PROCEDURE — 99233 SBSQ HOSP IP/OBS HIGH 50: CPT | Mod: GC | Performed by: STUDENT IN AN ORGANIZED HEALTH CARE EDUCATION/TRAINING PROGRAM

## 2022-05-22 PROCEDURE — 94640 AIRWAY INHALATION TREATMENT: CPT | Mod: 76

## 2022-05-22 PROCEDURE — 36415 COLL VENOUS BLD VENIPUNCTURE: CPT | Performed by: NURSE PRACTITIONER

## 2022-05-22 PROCEDURE — 80048 BASIC METABOLIC PNL TOTAL CA: CPT | Performed by: STUDENT IN AN ORGANIZED HEALTH CARE EDUCATION/TRAINING PROGRAM

## 2022-05-22 PROCEDURE — 94640 AIRWAY INHALATION TREATMENT: CPT

## 2022-05-22 PROCEDURE — 87040 BLOOD CULTURE FOR BACTERIA: CPT | Performed by: STUDENT IN AN ORGANIZED HEALTH CARE EDUCATION/TRAINING PROGRAM

## 2022-05-22 PROCEDURE — 258N000003 HC RX IP 258 OP 636: Performed by: STUDENT IN AN ORGANIZED HEALTH CARE EDUCATION/TRAINING PROGRAM

## 2022-05-22 PROCEDURE — 120N000005 HC R&B MS OVERFLOW UMMC

## 2022-05-22 PROCEDURE — 999N000157 HC STATISTIC RCP TIME EA 10 MIN

## 2022-05-22 PROCEDURE — 86140 C-REACTIVE PROTEIN: CPT | Performed by: NURSE PRACTITIONER

## 2022-05-22 PROCEDURE — 82803 BLOOD GASES ANY COMBINATION: CPT | Performed by: STUDENT IN AN ORGANIZED HEALTH CARE EDUCATION/TRAINING PROGRAM

## 2022-05-22 RX ORDER — CALCIUM CARBONATE 500 MG/1
500 TABLET, CHEWABLE ORAL 3 TIMES DAILY PRN
Status: DISCONTINUED | OUTPATIENT
Start: 2022-05-22 | End: 2022-05-24 | Stop reason: HOSPADM

## 2022-05-22 RX ORDER — LEVALBUTEROL INHALATION SOLUTION 1.25 MG/3ML
1.25 SOLUTION RESPIRATORY (INHALATION) ONCE
Status: COMPLETED | OUTPATIENT
Start: 2022-05-22 | End: 2022-05-22

## 2022-05-22 RX ADMIN — LEVALBUTEROL HYDROCHLORIDE 1.25 MG: 1.25 SOLUTION RESPIRATORY (INHALATION) at 19:16

## 2022-05-22 RX ADMIN — UMECLIDINIUM 1 PUFF: 62.5 AEROSOL, POWDER ORAL at 08:52

## 2022-05-22 RX ADMIN — GUAIFENESIN 1200 MG: 600 TABLET ORAL at 08:48

## 2022-05-22 RX ADMIN — SODIUM CHLORIDE SOLN NEBU 3% 3 ML: 3 NEBU SOLN at 09:18

## 2022-05-22 RX ADMIN — IBUPROFEN 400 MG: 400 TABLET, FILM COATED ORAL at 17:40

## 2022-05-22 RX ADMIN — LEVALBUTEROL HYDROCHLORIDE 1.25 MG: 1.25 SOLUTION RESPIRATORY (INHALATION) at 13:48

## 2022-05-22 RX ADMIN — LEVALBUTEROL HYDROCHLORIDE 1.25 MG: 1.25 SOLUTION RESPIRATORY (INHALATION) at 21:26

## 2022-05-22 RX ADMIN — AZITHROMYCIN MONOHYDRATE 500 MG: 500 TABLET ORAL at 08:49

## 2022-05-22 RX ADMIN — POLYETHYLENE GLYCOL 400 AND PROPYLENE GLYCOL 1 DROP: 4; 3 SOLUTION/ DROPS OPHTHALMIC at 08:56

## 2022-05-22 RX ADMIN — SODIUM CHLORIDE, POTASSIUM CHLORIDE, SODIUM LACTATE AND CALCIUM CHLORIDE 1000 ML: 600; 310; 30; 20 INJECTION, SOLUTION INTRAVENOUS at 20:18

## 2022-05-22 RX ADMIN — FLUTICASONE PROPIONATE 1 SPRAY: 50 SPRAY, METERED NASAL at 08:50

## 2022-05-22 RX ADMIN — POLYETHYLENE GLYCOL 400 AND PROPYLENE GLYCOL 1 DROP: 4; 3 SOLUTION/ DROPS OPHTHALMIC at 15:47

## 2022-05-22 RX ADMIN — SODIUM CHLORIDE, POTASSIUM CHLORIDE, SODIUM LACTATE AND CALCIUM CHLORIDE 500 ML: 600; 310; 30; 20 INJECTION, SOLUTION INTRAVENOUS at 22:58

## 2022-05-22 RX ADMIN — GLIPIZIDE 5 MG: 5 TABLET ORAL at 15:47

## 2022-05-22 RX ADMIN — GLIPIZIDE 5 MG: 5 TABLET ORAL at 08:49

## 2022-05-22 RX ADMIN — PANTOPRAZOLE SODIUM 40 MG: 40 TABLET, DELAYED RELEASE ORAL at 08:48

## 2022-05-22 RX ADMIN — SENNOSIDES AND DOCUSATE SODIUM 2 TABLET: 8.6; 5 TABLET ORAL at 09:24

## 2022-05-22 RX ADMIN — GUAIFENESIN 1200 MG: 600 TABLET ORAL at 19:16

## 2022-05-22 RX ADMIN — OLOPATADINE HYDROCHLORIDE 1 DROP: 1 SOLUTION OPHTHALMIC at 08:54

## 2022-05-22 RX ADMIN — ACETAMINOPHEN 650 MG: 325 TABLET ORAL at 03:14

## 2022-05-22 RX ADMIN — IPRATROPIUM BROMIDE 0.5 MG: 0.5 SOLUTION RESPIRATORY (INHALATION) at 17:29

## 2022-05-22 RX ADMIN — IBUPROFEN 400 MG: 400 TABLET, FILM COATED ORAL at 09:00

## 2022-05-22 RX ADMIN — ENOXAPARIN SODIUM 40 MG: 40 INJECTION SUBCUTANEOUS at 08:49

## 2022-05-22 RX ADMIN — IPRATROPIUM BROMIDE 0.5 MG: 0.5 SOLUTION RESPIRATORY (INHALATION) at 13:48

## 2022-05-22 RX ADMIN — INSULIN ASPART 3 UNITS: 100 INJECTION, SOLUTION INTRAVENOUS; SUBCUTANEOUS at 08:51

## 2022-05-22 RX ADMIN — PREDNISONE 40 MG: 20 TABLET ORAL at 08:47

## 2022-05-22 RX ADMIN — INSULIN GLARGINE 10 UNITS: 100 INJECTION, SOLUTION SUBCUTANEOUS at 13:03

## 2022-05-22 RX ADMIN — CETIRIZINE HYDROCHLORIDE 10 MG: 10 TABLET, FILM COATED ORAL at 08:48

## 2022-05-22 RX ADMIN — IPRATROPIUM BROMIDE 0.5 MG: 0.5 SOLUTION RESPIRATORY (INHALATION) at 21:26

## 2022-05-22 RX ADMIN — LEVALBUTEROL HYDROCHLORIDE 1.25 MG: 1.25 SOLUTION RESPIRATORY (INHALATION) at 09:17

## 2022-05-22 RX ADMIN — FLUTICASONE FUROATE AND VILANTEROL TRIFENATATE 1 PUFF: 200; 25 POWDER RESPIRATORY (INHALATION) at 08:50

## 2022-05-22 RX ADMIN — ASPIRIN 81 MG: 81 TABLET, COATED ORAL at 08:48

## 2022-05-22 RX ADMIN — INSULIN ASPART 7 UNITS: 100 INJECTION, SOLUTION INTRAVENOUS; SUBCUTANEOUS at 17:39

## 2022-05-22 RX ADMIN — POLYETHYLENE GLYCOL 400 AND PROPYLENE GLYCOL 1 DROP: 4; 3 SOLUTION/ DROPS OPHTHALMIC at 19:18

## 2022-05-22 RX ADMIN — ANTACID TABLETS 500 MG: 500 TABLET, CHEWABLE ORAL at 13:03

## 2022-05-22 RX ADMIN — BENZONATATE 200 MG: 100 CAPSULE ORAL at 09:24

## 2022-05-22 RX ADMIN — ACETAMINOPHEN 650 MG: 325 TABLET ORAL at 15:47

## 2022-05-22 RX ADMIN — NICOTINE 1 PATCH: 14 PATCH, EXTENDED RELEASE TRANSDERMAL at 08:52

## 2022-05-22 RX ADMIN — INSULIN ASPART 2 UNITS: 100 INJECTION, SOLUTION INTRAVENOUS; SUBCUTANEOUS at 13:06

## 2022-05-22 RX ADMIN — LIDOCAINE 1 PATCH: 560 PATCH PERCUTANEOUS; TOPICAL; TRANSDERMAL at 19:16

## 2022-05-22 RX ADMIN — AMLODIPINE BESYLATE 10 MG: 5 TABLET ORAL at 08:49

## 2022-05-22 RX ADMIN — POLYETHYLENE GLYCOL 400 AND PROPYLENE GLYCOL 1 DROP: 4; 3 SOLUTION/ DROPS OPHTHALMIC at 13:07

## 2022-05-22 RX ADMIN — IPRATROPIUM BROMIDE 0.5 MG: 0.5 SOLUTION RESPIRATORY (INHALATION) at 09:17

## 2022-05-22 ASSESSMENT — ACTIVITIES OF DAILY LIVING (ADL)
TOILETING_ISSUES: YES
DRESSING/BATHING: BATHING DIFFICULTY, REQUIRES EQUIPMENT
FALL_HISTORY_WITHIN_LAST_SIX_MONTHS: YES
WALKING_OR_CLIMBING_STAIRS_DIFFICULTY: YES
ADLS_ACUITY_SCORE: 35
CHANGE_IN_FUNCTIONAL_STATUS_SINCE_ONSET_OF_CURRENT_ILLNESS/INJURY: YES
ADLS_ACUITY_SCORE: 40
WALKING_OR_CLIMBING_STAIRS: AMBULATION DIFFICULTY, REQUIRES EQUIPMENT
ADLS_ACUITY_SCORE: 38
NUMBER_OF_TIMES_PATIENT_HAS_FALLEN_WITHIN_LAST_SIX_MONTHS: 2
ADLS_ACUITY_SCORE: 39
DOING_ERRANDS_INDEPENDENTLY_DIFFICULTY: NO
CONCENTRATING,_REMEMBERING_OR_MAKING_DECISIONS_DIFFICULTY: NO
ADLS_ACUITY_SCORE: 35
WEAR_GLASSES_OR_BLIND: YES
EATING/SWALLOWING: SWALLOWING SOLID FOOD
ADLS_ACUITY_SCORE: 40
ADLS_ACUITY_SCORE: 38
VISION_MANAGEMENT: GLASSES
ADLS_ACUITY_SCORE: 38
TOILETING_ASSISTANCE: TOILETING DIFFICULTY, REQUIRES EQUIPMENT
ADLS_ACUITY_SCORE: 40
ADLS_ACUITY_SCORE: 38
DRESSING/BATHING_DIFFICULTY: YES
ADLS_ACUITY_SCORE: 38
DIFFICULTY_EATING/SWALLOWING: YES
EQUIPMENT_CURRENTLY_USED_AT_HOME: WALKER, ROLLING;CANE, STRAIGHT
ADLS_ACUITY_SCORE: 35

## 2022-05-22 NOTE — CODE/RAPID RESPONSE
Rapid Response Team Note    Assessment   In assessment a rapid response was called on Jenn Aparicio due to lactic acidosis. This presentation is likely due to cancer, albuterol use, probable infection, possible fluid volume deficit.     Plan   -  Extra xopenex neb for wheezing  -  Respiratory viral panel  -  Sputum culture (pt report sputum color change clear-->brown)  -  Strep pneumoniae urine antigen  -  Repeat lactic acid around    -  Could broaden antibiotics pending above diagnostic testing    -  The Internal Medicine primary team was paged and currently awaiting a response.  -  Disposition: The patient will remain on the current unit. We will continue to monitor this patient closely.  -  Reassessment and plan follow-up will be performed by the primary team    BENOIT Gauthier CNP  [facility] RRT [facility] Job Code Contact #[phone]  [facility] Paging/Directory    Hospital Course   Brief Summary of events leading to rapid response:   Tachycardia, tachypnea, and leukocytosis leading to SIRS protocol with resulting lactic acid 3.5    Admission Diagnosis:   Shortness of breath [R06.02]  Hypoxia [R09.02]  Chest pain, unspecified type [R07.9]    Physical Exam   Temp: 98  F (36.7  C) Temp  Min: 97.7  F (36.5  C)  Max: 98.9  F (37.2  C)  Resp: 22 Resp  Min: 18  Max: 26  SpO2: 99 % SpO2  Min: 95 %  Max: 100 %  Pulse: 100 Pulse  Min: 92  Max: 122    No data recorded  BP: 129/77 Systolic (24hrs), Av , Min:129 , Max:160   Diastolic (24hrs), Av, Min:59, Max:77     I/Os: I/O last 3 completed shifts:  In: 660 [P.O.:660]  Out:  [Urine:]     Physical Exam  Vitals and nursing note reviewed.   Constitutional:       Appearance: She is ill-appearing.   Cardiovascular:      Rate and Rhythm: Regular rhythm. Tachycardia present.      Pulses: Normal pulses.   Pulmonary:      Effort: Tachypnea present.      Breath sounds: Decreased air movement present. Wheezing present.   Neurological:      Mental Status: Mental  status is at baseline.         Significant Results and Procedures   Lactic Acid:   Recent Labs   Lab Test 05/22/22  1616 05/21/22  1012 02/25/21  0620 02/24/21  1349 02/24/21  1110   LACT 3.5* 2.5* 1.9   < >  --    LACTS  --   --   --   --  5.3*    < > = values in this interval not displayed.     CBC:   Recent Labs   Lab Test 05/22/22  0501 05/21/22  0634 05/20/22  1836   WBC 12.9* 7.5 8.7   HGB 12.9 13.4 14.0   HCT 41.3 42.6 44.7    266 282      Sepsis Evaluation   The patient is suspected to have an infection.

## 2022-05-22 NOTE — PROVIDER NOTIFICATION
Provider on call notified via pager # [phone] with Telemetry trend changes occasional PVCs and wanted to know if Magnesium level should be added to AM lab. And she called back that she was going to add magnesium to AM lab.

## 2022-05-22 NOTE — PROGRESS NOTES
[facility]    Progress Note - [facility]       Date of Admission:  5/20/2022    Family Medicine Progress Note - [facility]       Main Plans for Today   - Start daily AM bolus glargine 10 unit(s) while on Prednisone  - Esophogram while inpatient, nonurgent    Assessment & Plan     Jenn Aparicio is a 66 year old female with a history of severe COPD (FEV1 24%) with baseline O2 need of 3 L, RLL adenocarcinoma s/p lobectomy (2/2016), RUL NSCLC s/p SBRT (01/2020), other pulmonary nodules, HTN, T2DM, and tobacco use who is admitted 5/20/22 for COPD exacerbation.       # Dyspnea at rest and with exertion  # Hypoxia, improved  # Productive cough  # COPD, severe (FEV1 24%)  # Baseline O2 need of 3 LPM  # Non-cardiac chest pain  # Tobacco use  66-year-old patient with history of severe COPD, lung cancer (see below) who presents with 3 weeks of shortness of breath, acutely worsening in the last 3 days in the setting of multiple environmental triggers and disjointed medication use due to pharmacy issues.  Patient had completed COPD exacerbation treatment with short course prednisone, doxycycline per pulmonologist Dr. Spann after appt on 5/4/2022.  Patient reported only modest improvement with this.  Environmental triggers include home mold, recent painting in the home, secondhand smoke in apartment building.  Last tobacco use over 4 weeks ago; does not smoke with worsened dyspnea.  Other associated symptoms include productive cough, intermittent dizziness, hypoxia down to the 60s-70s with exertion on baseline O2 of 3 LPM, CP.  Reports no fever, and with normal WBCs, imaging findings, high suspicion for isolated COPD exacerbation. Last hospitalized 6/22-6/25/2021 for the same.  Chest pain most likely noncardiac with admission ECG showing sinus tachycardia and troponin WNL, no improvement w/ EMS nitroglycerin spray. Pain is also reproducible with  "palpation Exacerbation appears to be moderate, will treat with oral meds/inhalers where appropriate.  - S/p magnesium sulfate in ED  - Inhalers/nebs              - Fluticasone-Umeclidin-Vilanterol (Trelegy) 1 puff daily                     - ipratropium + levalbuterol neb QID per RT, space as able              - NaCl neb daily              - Levalbuterol inhaler 2 puff q6h PRN  - Steroid              - S/p methylprednisolone x1 in ED              - Prednisone 40 mg PO daily x5 days (5/21 - )  - Abx: azithromycin 500 mg for 3 days  - Mucolytic: guaifenesin 1200 mg BID  - Flonase bilaterally daily  - Cetirizine 10 mg daily  - Nicotine replacement patches  - Continuous pulse ox  - VS q8h  - COPD consult, appreciate recs  - Smoking cession consult, appreciate assistance   - Pain: acetaminophen 650 mg q6h prn, ibuprofen 400 mg q6h PRN  - Daily BMP  - Daily CBC  - Discharge planning, future: Will be important establish reliable outpatient pharmacy for future refills to prevent readmission. Sleep study and possible future CPAP therapy recommended.     # NSCLC, adenocarcinoma, RUL  # RLL nodule  # ROBERT nodule, new  # S/p RL lobectomy 2016  From 3/31/22 oncology note: \"NSCLC, adeno, RUL: Now just over 2 years after SBRT. CT was personally reviewed. New RLL nodule is slightly longer. I'm still not sure whether it represents a malignancy or not. It would be difficult to bx. The ROBERT nodule is tiny. Both are still a little small for a PET to be useful.\" Plan was for repeat CT in 6 weeks (5/4/22), which demonstrated stable RUL nodule and stable ROBERT nodule but with \"new associated patchy irregular adjacent nodules and ill-defined opacities, unclear if cancer progression vs infection vs inflammatory.\" Oncologist Dr. Leung had called pt on 5/13 (left VM) to recommend repeat CT chest non-contrast in 6-8 weeks to follow up these findings, which are suspected to be inflammatory.   - Forward chart to Dr. Leung on discharge to prompt " ordering of follow up imaging if not already done     # Tachycardia  Tachycardic in the setting of recent nebs.  ECG on admission showed sinus tach.  - Will utilized levalbuterol for PRN use (above) to mitigate tachycardia  - Continue to monitor     # Flank pain, right  Intermittent right-sided flank pain associated with COPD exacerbation, dyspnea associated chest pain.  Worse with exertion, relieved by rest.  No dysuria or hematuria on admission, though does note recent intermittent dysuria.  No history of nephrolithiasis.  - UA (remarkable for protein, ketones, glucose only)  - PTA cyclobenzaprine 5 mg TID PRN     # Poor PO intake  # Concern for malnutrition  # Dysphagia, unclear etiology  Reports little to no PO intake in the days prior to admission, secondary to low appetite. Patient also notes some difficulty swallowing, and feeling like food is getting stuck in her throat. This is contributing to her decreased PO intake. Ordered barium esophogram 5/22, to be completed inpatient non-urgently.  - add on mag, phos  - Nutrition consult  - SLP eval for dysphagia, largely unremarkable.   - Regular diet     # Deconditioning  In the setting of chronic illness.  - Fall precautions  - PT, OT consults      # T2DM  Last A1C 8.4 in 01/2022, up from 7.1 6 years ago.  on admission  - PTA glipizide 5 mg BID AC  - MSSI + AM Glargine 10 unit(s) while on prednisone (5/22-5/24)  - Hypoglycemia protocol       Chronic, stable:     # HTN  - PTA amlodipine 10 mg      # Allergic conjunctivitis  - olopatadine 1 drop bilaterally daily     # Dry eyes  - PEG drops 1 drop bilateral daily     # Suspected DAVID  Previously evaluated by Sleep (3/2021) with recommendation for lab sleep study given likely DAVID (STOP BANG 4/8). Has not yet had this evaluation.      Diet: Combination Diet Regular Diet Adult  Snacks/Supplements Adult: Ensure Max Protein (bariatric); With Meals    DVT Prophylaxis: Enoxaparin (Lovenox) SQ  Andrade Catheter: Not  present  Fluids: PO  Central Lines: None  Cardiac Monitoring: None  Code Status: Full Code      Disposition Plan   Expected Discharge: 05/23/2022     Anticipated discharge location: home vs TCU     The patient's care was discussed with the Attending Physician, Dr. Boyer.    DO [facility]  [facility]  Securely message with the Vocera Web Console (learn more here)  Text page via [employer] Paging/Directory   Please see signed in provider for up to date coverage information    ______________________________________________________________________    Interval History   NAEO. Notes some chest pain, worse with coughing, non-radiating, improves with rest. Also describing epigastric pain, noting that all of her chest pain is the same. Discussed esophogram, adding tums for epigastric pain, and completing a sleep study after being discharged from the hospital for CPAP evaluation. Discussed importance of having O2 at lowest level while relieving dyspnea. Patient indicated understanding. Reviewed addition of glargine insulin while she is on prednisone.     Data reviewed today: I reviewed all medications, new labs and imaging results over the last 24 hours.     Physical Exam   Vital Signs: Temp: 98.3  F (36.8  C) Temp src: Axillary BP: 139/71 Pulse: 92   Resp: 23 SpO2: 99 % O2 Device: Nasal cannula Oxygen Delivery: 2 LPM  Weight: 210 lbs 15.68 oz    Physical Exam  Constitutional:       General: She is not in acute distress.     Appearance: Normal appearance. She is not diaphoretic.   HENT:      Head: Normocephalic and atraumatic.      Mouth/Throat:      Mouth: No oral lesions.      Dentition: Abnormal dentition. Dental caries present.      Pharynx: Oropharynx is clear.   Eyes:      Extraocular Movements: Extraocular movements intact.      Conjunctiva/sclera: Conjunctivae normal.   Cardiovascular:      Rate and Rhythm: Regular rhythm. Tachycardia  present.      Pulses:           Radial pulses are 2+ on the right side and 2+ on the left side.        Posterior tibial pulses are 2+ on the right side and 2+ on the left side.      Heart sounds: Normal heart sounds.   Pulmonary:      Effort: Pulmonary effort is normal. No respiratory distress.      Breath sounds: Wheezing (throughout lung fields) and rhonchi (intermittent, bibasilar) present. No rales.      Comments: Absent breath sounds on lower right, s/p resection.  Abdominal:      General: Bowel sounds are normal.      Palpations: Abdomen is soft.      Tenderness: There is no abdominal tenderness.   Musculoskeletal:         General: No tenderness or deformity.      Cervical back: No muscular tenderness.      Right lower leg: Edema (mild) present.      Left lower leg: Edema (mild) present.   Lymphadenopathy:      Cervical: No cervical adenopathy.   Skin:     General: Skin is warm and dry.      Findings: No lesion or rash.   Neurological:      General: No focal deficit present.      Mental Status: She is alert and oriented to person, place, and time. Mental status is at baseline.   Psychiatric:         Mood and Affect: Mood normal.         Behavior: Behavior normal.         Data   Recent Labs   Lab 05/22/22  0501 05/21/22  2205 05/21/22  1623 05/21/22  1142 05/21/22  0634 05/21/22  0606 05/20/22  1836   WBC 12.9*  --   --   --  7.5  --  8.7   HGB 12.9  --   --   --  13.4  --  14.0   MCV 90  --   --   --  90  --  90     --   --   --  266  --  282     --   --   --  140  --  139   POTASSIUM 3.8  --   --   --  4.0  --  3.2*   CHLORIDE 103  --   --   --  101  --  101   CO2 28  --   --   --  31  --  31   BUN 19  --   --   --  7  --  7   CR 0.48*  --   --   --  0.45*  --  0.46*   ANIONGAP 9  --   --   --  8  --  7   LUIGI 9.3  --   --   --  9.2  --  9.4   * 397* 310*   < > 333*   < > 245*   ALBUMIN  --   --   --   --   --   --  3.6   PROTTOTAL  --   --   --   --   --   --  7.7   BILITOTAL  --   --   --    --   --   --  0.3   ALKPHOS  --   --   --   --   --   --  98   ALT  --   --   --   --   --   --  18   AST  --   --   --   --   --   --  8    < > = values in this interval not displayed.

## 2022-05-22 NOTE — PLAN OF CARE
Problem: Plan of Care - These are the overarching goals to be used throughout the patient stay.    Goal: Plan of Care Review/Shift Note  Description: The Plan of Care Review/Shift note should be completed every shift.  The Outcome Evaluation is a brief statement about your assessment that the patient is improving, declining, or no change.  This information will be displayed automatically on your shift note.  Outcome: Ongoing, Progressing  Flowsheets (Taken 5/22/2022 1436)  Plan of Care Reviewed With: patient   Goal Outcome Evaluation:    Plan of Care Reviewed With: patient     C/o pressure in chest that was partially relieved with ibuprofen this morning. Experienced epigastric discomfort after bkft this morning, providers were informed on rounds.Tums given with lunch with good results. Glucose covered (mid 200's) and lantus started at 10u/day. RUSSELL perisists, O2 at 2-4l , mild desats with activity and eating. Nebs given per RT, with coughing at the end of Rx. Occasional wheezing with decreased breath sounds. Sat mid 90's at rest. Telemetry DC'ed after MD stated it was not ordered. Intermittent tachycardia. OVSS.Showered. Appetite good, eating all of meals. Pt is increasing strength and steadiness on feet, stand by assist continues. Continue POC.

## 2022-05-22 NOTE — PROVIDER NOTIFICATION
Text paged:  Polina Smyth MD [id number] [phone]         Pt triggered sepsis (), lactic acid 3.5, RRT called.  BG also elevated 499.

## 2022-05-22 NOTE — PLAN OF CARE
"Goal Outcome Evaluation:    Assumed cares from 19:00 to 07:30    Blood pressure 139/71, pulse 92, temperature 98.3  F (36.8  C), temperature source Axillary, resp. rate 23, weight 95.7 kg (210 lb 15.7 oz), SpO2 99 %, not currently breastfeeding.    Afebrile blood pressure slightly high, 143/76 to 139/71 respirations 20s and using 2 L 02 via NC and sating high 90 %.patient had 1 coughing spell and moderate amount of clear white sputum. C/O back chest pain and requested Tylenol with good relief. She gets dyspnea with exertion. Up to the bathroom with standby. No stool this shift. Lung sounds wheezes and is on scheduled Neb treatment. HS Blood glucose was 397 which warranted 4 unit of sliding scale insulin and Blood glucose this morning was 235. Pt is on continuous Telemetry and had some PVC. Provider on call notified with trend changes and she ordered magnesium level to be added to this morning lab and it came back 2.1 Potassium level was 3.8. PIV is saline locked ands is patent with intact dressing..Continue to monitor pt and follow plan of care.      Problem: Plan of Care - These are the overarching goals to be used throughout the patient stay.    Goal: Plan of Care Review/Shift Note  Description: The Plan of Care Review/Shift note should be completed every shift.  The Outcome Evaluation is a brief statement about your assessment that the patient is improving, declining, or no change.  This information will be displayed automatically on your shift note.  Outcome: Ongoing, Progressing     Problem: Plan of Care - These are the overarching goals to be used throughout the patient stay.    Goal: Patient-Specific Goal (Individualized)  Description: You can add care plan individualizations to a care plan. Examples of Individualization might be:  \"Parent requests to be called daily at 9am for status\", \"I have a hard time hearing out of my right ear\", or \"Do not touch me to wake me up as it startles me\".  Outcome: Ongoing, " Progressing     Problem: Plan of Care - These are the overarching goals to be used throughout the patient stay.    Goal: Absence of Hospital-Acquired Illness or Injury  Intervention: Identify and Manage Fall Risk  Recent Flowsheet Documentation  Taken 5/22/2022 0400 by Rani Gardiner RN  Safety Promotion/Fall Prevention:   assistive device/personal items within reach   fall prevention program maintained   lighting adjusted   nonskid shoes/slippers when out of bed  Taken 5/21/2022 2000 by Rani Gardiner RN  Safety Promotion/Fall Prevention:   assistive device/personal items within reach   fall prevention program maintained   lighting adjusted   nonskid shoes/slippers when out of bed     Problem: Plan of Care - These are the overarching goals to be used throughout the patient stay.    Goal: Absence of Hospital-Acquired Illness or Injury  Intervention: Prevent Skin Injury  Recent Flowsheet Documentation  Taken 5/22/2022 0400 by Rani Gardiner RN  Skin Protection:   adhesive use limited   pulse oximeter probe site changed  Taken 5/21/2022 2000 by Rani Gardiner RN  Skin Protection:   adhesive use limited   pulse oximeter probe site changed     Problem: Plan of Care - These are the overarching goals to be used throughout the patient stay.    Goal: Absence of Hospital-Acquired Illness or Injury  Intervention: Prevent and Manage VTE (Venous Thromboembolism) Risk  Recent Flowsheet Documentation  Taken 5/22/2022 0400 by Rani Gardiner RN  VTE Prevention/Management: SCDs (sequential compression devices) off  Activity Management:   activity adjusted per tolerance   up ad betsey   ambulated to bathroom  Taken 5/21/2022 2000 by Rani Gardiner RN  VTE Prevention/Management: SCDs (sequential compression devices) off  Activity Management:   activity adjusted per tolerance   up ad betsey   ambulated to bathroom     Problem: Plan of Care - These are the overarching goals to be used throughout the patient stay.     Goal: Absence of Hospital-Acquired Illness or Injury  Intervention: Prevent Infection  Recent Flowsheet Documentation  Taken 5/22/2022 0400 by Rani Gardiner, RN  Infection Prevention:   visitors restricted/screened   environmental surveillance performed   hand hygiene promoted   equipment surfaces disinfected   personal protective equipment utilized   rest/sleep promoted   single patient room provided  Taken 5/21/2022 2000 by Rani Gardiner RN  Infection Prevention:   visitors restricted/screened   environmental surveillance performed   hand hygiene promoted   equipment surfaces disinfected   personal protective equipment utilized   rest/sleep promoted   single patient room provided     Problem: Plan of Care - These are the overarching goals to be used throughout the patient stay.    Goal: Optimal Comfort and Wellbeing  Outcome: Ongoing, Progressing

## 2022-05-22 NOTE — PROGRESS NOTES
Sepsis Evaluation Progress Note    I was called to see Jenn Aparicio due to abnormal vital signs triggering the Sepsis SIRS screening alert. She is not known to have an infection.     Physical Exam   Vital Signs:  Temp: 98  F (36.7  C) Temp src: Oral BP: 129/77 Pulse: 100   Resp: 22 SpO2: 99 % O2 Device: Nasal cannula Oxygen Delivery: 3 LPM     General: chronically ill appearing  Mental Status: baseline mental status.     Remainder of physical exam is significant for end expiratory wheezing throughout. Received a neb just prior to my exam.    Data   Lactic Acid   Date Value Ref Range Status   05/22/2022 3.5 (H) 0.7 - 2.0 mmol/L Final   05/21/2022 2.5 (H) 0.7 - 2.0 mmol/L Final   02/25/2021 1.9 0.7 - 2.0 mmol/L Final   02/24/2021 3.7 (H) 0.7 - 2.0 mmol/L Final     Lactate for Sepsis Protocol   Date Value Ref Range Status   02/24/2021 5.3 (HH) 0.7 - 2.0 mmol/L Final     Comment:     Value above critical limit  Critical Value called to and read back by  JOHANN CARPIO ON 02/24/2021 AT 1138 BY TG         Assessment & Plan   NO EVIDENCE OF SEPSIS at this time.  Vital sign, physical exam, and lab findings are due to frequent albuterol nebs.    Will obtain procal, CXR, blood cultures.     Elevated  in the setting of dexamethasone. Glargine added today in addition to MISS.    Disposition: The patient will remain on the current unit. We will continue to monitor this patient closely..  Polina Smyth MD    Sepsis Criteria   Sepsis: 2+ SIRS criteria due to infection  Severe Sepsis: Sepsis AND 1+ new sign of acute organ dysfunction (Note: lactate >2 or acute encephalopathy each qualify as organ dysfunction)  Septic Shock: Sepsis AND hypotension despite volume resuscitation with 30 ml/kg crystalloid or lactate >=4  Note: HYPOTENSION is defined as 2 BP readings measured 3 hrs apart that have a SBP <90, MAP <65, or decrease >40 mmHg, occurring 6 hrs before or after t-zero    Update from Dr. Smyth's note  from day's  Updated by NP from [facility] earlier today of her plans and agree with them  CXR is unchanged and Procal is low with no fevers; elevated white count likely from prednisone and tachycardia likely from xopenox and duonebs as well as pts lactic acidosis, which given hyperglycemia could have some metabolic acidosis at play; pt given 7 unit novolog at that time, will monitor night time sugars. However repeat LA is still up a little 3.9 and given pt is Net negative 1.5 L per I/O's and has insensitive losses will give a bolus of LR and repeat Lactic in a few hours.     Kalina Lamb MD

## 2022-05-23 ENCOUNTER — APPOINTMENT (OUTPATIENT)
Dept: PHYSICAL THERAPY | Facility: CLINIC | Age: 67
DRG: 190 | End: 2022-05-23
Payer: COMMERCIAL

## 2022-05-23 ENCOUNTER — APPOINTMENT (OUTPATIENT)
Dept: GENERAL RADIOLOGY | Facility: CLINIC | Age: 67
DRG: 190 | End: 2022-05-23
Payer: COMMERCIAL

## 2022-05-23 LAB
ANION GAP SERPL CALCULATED.3IONS-SCNC: 7 MMOL/L (ref 3–14)
BUN SERPL-MCNC: 20 MG/DL (ref 7–30)
CALCIUM SERPL-MCNC: 9.3 MG/DL (ref 8.5–10.1)
CHLORIDE BLD-SCNC: 104 MMOL/L (ref 94–109)
CO2 SERPL-SCNC: 30 MMOL/L (ref 20–32)
CREAT SERPL-MCNC: 0.47 MG/DL (ref 0.52–1.04)
ERYTHROCYTE [DISTWIDTH] IN BLOOD BY AUTOMATED COUNT: 13.3 % (ref 10–15)
GFR SERPL CREATININE-BSD FRML MDRD: >90 ML/MIN/1.73M2
GLUCOSE BLD-MCNC: 233 MG/DL (ref 70–99)
GLUCOSE BLDC GLUCOMTR-MCNC: 171 MG/DL (ref 70–99)
GLUCOSE BLDC GLUCOMTR-MCNC: 249 MG/DL (ref 70–99)
GLUCOSE BLDC GLUCOMTR-MCNC: 264 MG/DL (ref 70–99)
GLUCOSE BLDC GLUCOMTR-MCNC: 352 MG/DL (ref 70–99)
GLUCOSE BLDC GLUCOMTR-MCNC: 405 MG/DL (ref 70–99)
HCT VFR BLD AUTO: 42 % (ref 35–47)
HGB BLD-MCNC: 13 G/DL (ref 11.7–15.7)
MCH RBC QN AUTO: 28.3 PG (ref 26.5–33)
MCHC RBC AUTO-ENTMCNC: 31 G/DL (ref 31.5–36.5)
MCV RBC AUTO: 92 FL (ref 78–100)
PLATELET # BLD AUTO: 235 10E3/UL (ref 150–450)
POTASSIUM BLD-SCNC: 3.6 MMOL/L (ref 3.4–5.3)
RBC # BLD AUTO: 4.59 10E6/UL (ref 3.8–5.2)
SODIUM SERPL-SCNC: 141 MMOL/L (ref 133–144)
WBC # BLD AUTO: 11.6 10E3/UL (ref 4–11)

## 2022-05-23 PROCEDURE — 97530 THERAPEUTIC ACTIVITIES: CPT | Mod: GP | Performed by: REHABILITATION PRACTITIONER

## 2022-05-23 PROCEDURE — 250N000011 HC RX IP 250 OP 636: Performed by: STUDENT IN AN ORGANIZED HEALTH CARE EDUCATION/TRAINING PROGRAM

## 2022-05-23 PROCEDURE — 250N000009 HC RX 250: Performed by: STUDENT IN AN ORGANIZED HEALTH CARE EDUCATION/TRAINING PROGRAM

## 2022-05-23 PROCEDURE — 999N000033 HC STATISTIC CHRONIC PULMONARY DISEASE SPECIALIST

## 2022-05-23 PROCEDURE — 99233 SBSQ HOSP IP/OBS HIGH 50: CPT | Mod: GC | Performed by: STUDENT IN AN ORGANIZED HEALTH CARE EDUCATION/TRAINING PROGRAM

## 2022-05-23 PROCEDURE — 272N000202 HC AEROBIKA WITH MANOMETER

## 2022-05-23 PROCEDURE — 74220 X-RAY XM ESOPHAGUS 1CNTRST: CPT

## 2022-05-23 PROCEDURE — 999N000157 HC STATISTIC RCP TIME EA 10 MIN

## 2022-05-23 PROCEDURE — 82310 ASSAY OF CALCIUM: CPT | Performed by: STUDENT IN AN ORGANIZED HEALTH CARE EDUCATION/TRAINING PROGRAM

## 2022-05-23 PROCEDURE — 94640 AIRWAY INHALATION TREATMENT: CPT | Mod: 76

## 2022-05-23 PROCEDURE — 250N000012 HC RX MED GY IP 250 OP 636 PS 637: Performed by: STUDENT IN AN ORGANIZED HEALTH CARE EDUCATION/TRAINING PROGRAM

## 2022-05-23 PROCEDURE — 250N000013 HC RX MED GY IP 250 OP 250 PS 637: Performed by: STUDENT IN AN ORGANIZED HEALTH CARE EDUCATION/TRAINING PROGRAM

## 2022-05-23 PROCEDURE — 120N000005 HC R&B MS OVERFLOW UMMC

## 2022-05-23 PROCEDURE — 999N000032 HC STATISTIC CHRONIC DISEASE SPECIALIST RT CONSULT

## 2022-05-23 PROCEDURE — 99406 BEHAV CHNG SMOKING 3-10 MIN: CPT

## 2022-05-23 PROCEDURE — 74220 X-RAY XM ESOPHAGUS 1CNTRST: CPT | Mod: 26 | Performed by: RADIOLOGY

## 2022-05-23 PROCEDURE — 97116 GAIT TRAINING THERAPY: CPT | Mod: GP | Performed by: REHABILITATION PRACTITIONER

## 2022-05-23 PROCEDURE — 85027 COMPLETE CBC AUTOMATED: CPT | Performed by: STUDENT IN AN ORGANIZED HEALTH CARE EDUCATION/TRAINING PROGRAM

## 2022-05-23 PROCEDURE — G0463 HOSPITAL OUTPT CLINIC VISIT: HCPCS

## 2022-05-23 PROCEDURE — 36415 COLL VENOUS BLD VENIPUNCTURE: CPT | Performed by: STUDENT IN AN ORGANIZED HEALTH CARE EDUCATION/TRAINING PROGRAM

## 2022-05-23 PROCEDURE — 94640 AIRWAY INHALATION TREATMENT: CPT

## 2022-05-23 RX ADMIN — CETIRIZINE HYDROCHLORIDE 10 MG: 10 TABLET, FILM COATED ORAL at 08:32

## 2022-05-23 RX ADMIN — POLYETHYLENE GLYCOL 400 AND PROPYLENE GLYCOL 1 DROP: 4; 3 SOLUTION/ DROPS OPHTHALMIC at 08:37

## 2022-05-23 RX ADMIN — POLYETHYLENE GLYCOL 400 AND PROPYLENE GLYCOL 1 DROP: 4; 3 SOLUTION/ DROPS OPHTHALMIC at 19:36

## 2022-05-23 RX ADMIN — INSULIN ASPART 3 UNITS: 100 INJECTION, SOLUTION INTRAVENOUS; SUBCUTANEOUS at 12:54

## 2022-05-23 RX ADMIN — GLIPIZIDE 5 MG: 5 TABLET ORAL at 08:33

## 2022-05-23 RX ADMIN — PANTOPRAZOLE SODIUM 40 MG: 40 TABLET, DELAYED RELEASE ORAL at 08:32

## 2022-05-23 RX ADMIN — GLIPIZIDE 5 MG: 5 TABLET ORAL at 17:08

## 2022-05-23 RX ADMIN — LEVALBUTEROL HYDROCHLORIDE 1.25 MG: 1.25 SOLUTION RESPIRATORY (INHALATION) at 04:40

## 2022-05-23 RX ADMIN — GUAIFENESIN 1200 MG: 600 TABLET ORAL at 08:32

## 2022-05-23 RX ADMIN — PREDNISONE 40 MG: 20 TABLET ORAL at 08:32

## 2022-05-23 RX ADMIN — POLYETHYLENE GLYCOL 400 AND PROPYLENE GLYCOL 1 DROP: 4; 3 SOLUTION/ DROPS OPHTHALMIC at 15:57

## 2022-05-23 RX ADMIN — LEVALBUTEROL HYDROCHLORIDE 1.25 MG: 1.25 SOLUTION RESPIRATORY (INHALATION) at 20:43

## 2022-05-23 RX ADMIN — AMLODIPINE BESYLATE 10 MG: 5 TABLET ORAL at 08:32

## 2022-05-23 RX ADMIN — GUAIFENESIN 1200 MG: 600 TABLET ORAL at 19:33

## 2022-05-23 RX ADMIN — AZITHROMYCIN MONOHYDRATE 500 MG: 500 TABLET ORAL at 08:32

## 2022-05-23 RX ADMIN — INSULIN GLARGINE 12 UNITS: 100 INJECTION, SOLUTION SUBCUTANEOUS at 08:26

## 2022-05-23 RX ADMIN — FLUTICASONE FUROATE AND VILANTEROL TRIFENATATE 1 PUFF: 200; 25 POWDER RESPIRATORY (INHALATION) at 08:35

## 2022-05-23 RX ADMIN — OLOPATADINE HYDROCHLORIDE 1 DROP: 1 SOLUTION OPHTHALMIC at 08:37

## 2022-05-23 RX ADMIN — ASPIRIN 81 MG: 81 TABLET, COATED ORAL at 08:32

## 2022-05-23 RX ADMIN — FLUTICASONE PROPIONATE 1 SPRAY: 50 SPRAY, METERED NASAL at 08:35

## 2022-05-23 RX ADMIN — SODIUM CHLORIDE SOLN NEBU 3% 3 ML: 3 NEBU SOLN at 07:50

## 2022-05-23 RX ADMIN — LEVALBUTEROL HYDROCHLORIDE 1.25 MG: 1.25 SOLUTION RESPIRATORY (INHALATION) at 16:43

## 2022-05-23 RX ADMIN — SENNOSIDES AND DOCUSATE SODIUM 2 TABLET: 8.6; 5 TABLET ORAL at 12:54

## 2022-05-23 RX ADMIN — POLYETHYLENE GLYCOL 400 AND PROPYLENE GLYCOL 1 DROP: 4; 3 SOLUTION/ DROPS OPHTHALMIC at 12:59

## 2022-05-23 RX ADMIN — LIDOCAINE 1 PATCH: 560 PATCH PERCUTANEOUS; TOPICAL; TRANSDERMAL at 19:33

## 2022-05-23 RX ADMIN — UMECLIDINIUM 1 PUFF: 62.5 AEROSOL, POWDER ORAL at 08:36

## 2022-05-23 RX ADMIN — IPRATROPIUM BROMIDE 0.5 MG: 0.5 SOLUTION RESPIRATORY (INHALATION) at 07:50

## 2022-05-23 RX ADMIN — ENOXAPARIN SODIUM 40 MG: 40 INJECTION SUBCUTANEOUS at 08:27

## 2022-05-23 RX ADMIN — IPRATROPIUM BROMIDE 0.5 MG: 0.5 SOLUTION RESPIRATORY (INHALATION) at 20:43

## 2022-05-23 RX ADMIN — IPRATROPIUM BROMIDE 0.5 MG: 0.5 SOLUTION RESPIRATORY (INHALATION) at 12:06

## 2022-05-23 RX ADMIN — INSULIN ASPART 6 UNITS: 100 INJECTION, SOLUTION INTRAVENOUS; SUBCUTANEOUS at 16:59

## 2022-05-23 RX ADMIN — IBUPROFEN 400 MG: 400 TABLET, FILM COATED ORAL at 19:33

## 2022-05-23 RX ADMIN — LEVALBUTEROL HYDROCHLORIDE 1.25 MG: 1.25 SOLUTION RESPIRATORY (INHALATION) at 07:50

## 2022-05-23 RX ADMIN — INSULIN ASPART 1 UNITS: 100 INJECTION, SOLUTION INTRAVENOUS; SUBCUTANEOUS at 08:25

## 2022-05-23 RX ADMIN — ACETAMINOPHEN 650 MG: 325 TABLET ORAL at 00:03

## 2022-05-23 RX ADMIN — NICOTINE 1 PATCH: 14 PATCH, EXTENDED RELEASE TRANSDERMAL at 08:59

## 2022-05-23 ASSESSMENT — ACTIVITIES OF DAILY LIVING (ADL)
ADLS_ACUITY_SCORE: 39
ADLS_ACUITY_SCORE: 38
ADLS_ACUITY_SCORE: 39
ADLS_ACUITY_SCORE: 39
ADLS_ACUITY_SCORE: 38
ADLS_ACUITY_SCORE: 39
ADLS_ACUITY_SCORE: 38
ADLS_ACUITY_SCORE: 39
ADLS_ACUITY_SCORE: 39
ADLS_ACUITY_SCORE: 38
ADLS_ACUITY_SCORE: 39
ADLS_ACUITY_SCORE: 39

## 2022-05-23 NOTE — PROGRESS NOTES
[facility]    Progress Note - [facility]       Date of Admission:  5/20/2022    Family Medicine Progress Note - [facility]       Main Plans for Today   - Daily AM bolus glargine 12 unit(s) while on Prednisone  - Esophogram completed today, read pending    Assessment & Plan     Jenn Aparicio is a 66 year old female with a history of severe COPD (FEV1 24%) with baseline O2 need of 3 L, RLL adenocarcinoma s/p lobectomy (2/2016), RUL NSCLC s/p SBRT (01/2020), other pulmonary nodules, HTN, T2DM, and tobacco use who is admitted 5/20/22 for COPD exacerbation.       # Dyspnea at rest and with exertion  # Acute on chronic respiratory failure   # Hypoxia, improved  # Productive cough  # COPD, severe (FEV1 24%)  # Baseline O2 need of 3 LPM  # Non-cardiac chest pain  # Tobacco use  66-year-old patient with history of severe COPD, lung cancer (see below) who presents with 3 weeks of shortness of breath, acutely worsening in the last 3 days in the setting of multiple environmental triggers and disjointed medication use due to pharmacy issues.  Patient had completed COPD exacerbation treatment with short course prednisone, doxycycline per pulmonologist Dr. Spann after appt on 5/4/2022.  Patient reported only modest improvement with this.  Environmental triggers include home mold, recent painting in the home, secondhand smoke in apartment building.  Last tobacco use over 4 weeks ago; does not smoke with worsened dyspnea.  Other associated symptoms include productive cough, intermittent dizziness, hypoxia down to the 60s-70s with exertion on baseline O2 of 3 LPM, CP.  Reports no fever, and with normal WBCs, imaging findings, high suspicion for isolated COPD exacerbation. Last hospitalized 6/22-6/25/2021 for the same.  Chest pain most likely noncardiac with admission ECG showing sinus tachycardia and troponin WNL, no improvement w/ EMS nitroglycerin spray.  "Pain is also reproducible with palpation Exacerbation appears to be moderate, will treat with oral meds/inhalers where appropriate.  - S/p magnesium sulfate in ED  - Inhalers/nebs              - Fluticasone-Umeclidin-Vilanterol (Trelegy) 1 puff daily                     - ipratropium + levalbuterol neb QID per RT, space as able              - NaCl neb daily              - Levalbuterol inhaler 2 puff q6h PRN  - Steroid              - S/p methylprednisolone x1 in ED              - Prednisone 40 mg PO daily x5 days (5/21 - 5/25)  - Abx: s/p azithromycin 500 mg for 3 days  - Mucolytic: guaifenesin 1200 mg BID  - Flonase bilaterally daily  - Cetirizine 10 mg daily  - Nicotine replacement patches  - Continuous pulse ox  - VS q8h  - COPD consult, appreciate recs  - Smoking cession consult, appreciate assistance   - Pain: acetaminophen 650 mg q6h prn, ibuprofen 400 mg q6h PRN  - Daily BMP  - Daily CBC  - Discharge planning, future: Will be important establish reliable outpatient pharmacy for future refills to prevent readmission. Sleep study and possible future CPAP therapy recommended.     # NSCLC, adenocarcinoma, RUL  # RLL nodule  # ROBERT nodule, new  # S/p RL lobectomy 2016  From 3/31/22 oncology note: \"NSCLC, adeno, RUL: Now just over 2 years after SBRT. CT was personally reviewed. New RLL nodule is slightly longer. I'm still not sure whether it represents a malignancy or not. It would be difficult to bx. The ROBERT nodule is tiny. Both are still a little small for a PET to be useful.\" Plan was for repeat CT in 6 weeks (5/4/22), which demonstrated stable RUL nodule and stable ROBERT nodule but with \"new associated patchy irregular adjacent nodules and ill-defined opacities, unclear if cancer progression vs infection vs inflammatory.\" Oncologist Dr. Leung had called pt on 5/13 (left VM) to recommend repeat CT chest non-contrast in 6-8 weeks to follow up these findings, which are suspected to be inflammatory.   - Forward chart " to Dr. Leung on discharge to prompt ordering of follow up imaging if not already done     # Tachycardia  Tachycardic in the setting of recent nebs.  ECG on admission showed sinus tach.  - Will utilized levalbuterol for PRN use (above) to mitigate tachycardia  - Continue to monitor     # Flank pain, right  Intermittent right-sided flank pain associated with COPD exacerbation, dyspnea associated chest pain.  Worse with exertion, relieved by rest.  No dysuria or hematuria on admission, though does note recent intermittent dysuria.  No history of nephrolithiasis.  - UA (remarkable for protein, ketones, glucose only)  - PTA cyclobenzaprine 5 mg TID PRN     # Poor PO intake  # Concern for malnutrition  # Dysphagia, unclear etiology  Reports little to no PO intake in the days prior to admission, secondary to low appetite. Patient also notes some difficulty swallowing, and feeling like food is getting stuck in her throat. This is contributing to her decreased PO intake. Esophagram completed 5/23, read pending  - add on mag, phos  - Nutrition consult  - SLP eval for dysphagia, largely unremarkable.   - Esophagram completed, read pending.  - Regular diet     # Deconditioning  In the setting of chronic illness.  - Fall precautions  - PT, OT consults      # T2DM  Last A1C 8.4 in 01/2022, up from 7.1 6 years ago.  on admission  - PTA glipizide 5 mg BID AC  - MSSI + AM Glargine 12 unit(s) while on prednisone (5/21-5/25)  - Hypoglycemia protocol  - Patient has$0 copay for jardiance and victoza - will put in discharge summary to be started outpatient      Chronic, stable:     # HTN  - PTA amlodipine 10 mg      # Allergic conjunctivitis  - olopatadine 1 drop bilaterally daily     # Dry eyes  - PEG drops 1 drop bilateral daily     # Suspected DAVID  Previously evaluated by Sleep (3/2021) with recommendation for lab sleep study given likely DAVID (STOP BANG 4/8). Has not yet had this evaluation.      Diet: Combination Diet Regular  Diet Adult  Snacks/Supplements Adult: Ensure Max Protein (bariatric); With Meals    DVT Prophylaxis: Enoxaparin (Lovenox) SQ  Andrade Catheter: Not present  Fluids: PO  Central Lines: None  Cardiac Monitoring: None  Code Status: Full Code      Disposition Plan   Expected Discharge: 05/23/2022     Anticipated discharge location: home vs TCU     The patient's care was discussed with the Attending Physician, Dr. Boyer.    Aster Elias MD  [facility]  [facility]  Securely message with the Vocera Web Console (learn more here)  Text page via [facility] Paging/Directory   Please see signed in provider for up to date coverage information    ______________________________________________________________________    Interval History   NAEO. Notes some chest pain, worse with coughing, non-radiating, improves with rest. Continues to note difficulty swallowing.     Data reviewed today: I reviewed all medications, new labs and imaging results over the last 24 hours.     Physical Exam   Vital Signs: Temp: 98  F (36.7  C) Temp src: Oral BP: 139/71 Pulse: 110   Resp: 18 SpO2: 98 % O2 Device: Nasal cannula Oxygen Delivery: 3 LPM  Weight: 210 lbs 15.68 oz    Physical Exam  Constitutional:       General: She is not in acute distress.     Appearance: Normal appearance. She is not diaphoretic.   HENT:      Head: Normocephalic and atraumatic.      Mouth/Throat:      Mouth: No oral lesions.      Dentition: Abnormal dentition. Dental caries present.      Pharynx: Oropharynx is clear.   Eyes:      Extraocular Movements: Extraocular movements intact.      Conjunctiva/sclera: Conjunctivae normal.   Cardiovascular:      Rate and Rhythm: Regular rhythm. Tachycardia present.      Pulses:           Radial pulses are 2+ on the right side and 2+ on the left side.        Posterior tibial pulses are 2+ on the right side and 2+ on the left side.      Heart sounds: Normal heart sounds.    Pulmonary:      Effort: Pulmonary effort is normal. No respiratory distress.      Breath sounds: Wheezing (throughout lung fields) and rhonchi (intermittent, bibasilar) present. No rales.      Comments: Absent breath sounds on lower right, s/p resection.  Abdominal:      General: Bowel sounds are normal.      Palpations: Abdomen is soft.      Tenderness: There is no abdominal tenderness.   Musculoskeletal:         General: No tenderness or deformity.      Cervical back: No muscular tenderness.      Right lower leg: Edema (mild) present.      Left lower leg: Edema (mild) present.   Lymphadenopathy:      Cervical: No cervical adenopathy.   Skin:     General: Skin is warm and dry.      Findings: No lesion or rash.   Neurological:      General: No focal deficit present.      Mental Status: She is alert and oriented to person, place, and time. Mental status is at baseline.   Psychiatric:         Mood and Affect: Mood normal.         Behavior: Behavior normal.         Data   Recent Labs   Lab 05/23/22  0500 05/23/22  0231 05/22/22  2137 05/22/22  1705 05/22/22  0753 05/22/22  0501 05/21/22  1142 05/21/22  0634 05/21/22  0606 05/20/22  1836   WBC 11.6*  --   --   --   --  12.9*  --  7.5  --  8.7   HGB 13.0  --   --   --   --  12.9  --  13.4  --  14.0   MCV 92  --   --   --   --  90  --  90  --  90     --   --   --   --  236  --  266  --  282     --   --   --   --  140  --  140  --  139   POTASSIUM 3.6  --   --   --   --  3.8  --  4.0  --  3.2*   CHLORIDE 104  --   --   --   --  103  --  101  --  101   CO2  --   --   --   --   --  28  --  31  --  31   BUN  --   --   --   --   --  19  --  7  --  7   CR  --   --   --   --   --  0.48*  --  0.45*  --  0.46*   ANIONGAP  --   --   --   --   --  9  --  8  --  7   LUIGI  --   --   --   --   --  9.3  --  9.2  --  9.4   GLC  --  264* 394* 499*   < > 235*   < > 333*   < > 245*   ALBUMIN  --   --   --   --   --   --   --   --   --  3.6   PROTTOTAL  --   --   --   --   --    --   --   --   --  7.7   BILITOTAL  --   --   --   --   --   --   --   --   --  0.3   ALKPHOS  --   --   --   --   --   --   --   --   --  98   ALT  --   --   --   --   --   --   --   --   --  18   AST  --   --   --   --   --   --   --   --   --  8    < > = values in this interval not displayed.

## 2022-05-23 NOTE — CONSULTS
Care Management Initial Consult    General Information  Assessment completed with: Patient,    Type of CM/SW Visit: Initial Assessment    Primary Care Provider verified and updated as needed: Yes   Readmission within the last 30 days: no previous admission in last 30 days      Reason for Consult: other (see comments) (elevated risk score)  Advance Care Planning:            Communication Assessment  Patient's communication style: spoken language (English or Bilingual)    Hearing Difficulty or Deaf: no   Wear Glasses or Blind: yes    Cognitive  Cognitive/Neuro/Behavioral: WDL                      Living Environment:   People in home: alone     Current living Arrangements: independent living facility      Able to return to prior arrangements:         Family/Social Support:  Care provided by: self  Provides care for: no one  Marital Status: Single  Children          Description of Support System: Supportive, Involved         Current Resources:   Patient receiving home care services:       Community Resources:    Equipment currently used at home: walker, rolling, cane, straight  Supplies currently used at home:      Employment/Financial:  Employment Status: disabled        Financial Concerns:             Lifestyle & Psychosocial Needs:  Social Determinants of Health     Tobacco Use: High Risk     Smoking Tobacco Use: Current Some Day Smoker     Smokeless Tobacco Use: Former User   Alcohol Use: Not on file   Financial Resource Strain: Not on file   Food Insecurity: Not on file   Transportation Needs: Not on file   Physical Activity: Not on file   Stress: Not on file   Social Connections: Not on file   Intimate Partner Violence: Not on file   Depression: Not at risk     PHQ-2 Score: 0   Housing Stability: Not on file       Functional Status:  Prior to admission patient needed assistance:              Mental Health Status:          Chemical Dependency Status:                Values/Beliefs:  Spiritual, Cultural Beliefs,  Oriental orthodox Practices, Values that affect care: yes          Values/Beliefs Comment:  (Michelle)    Additional Information:  Per H&P: Jenn Aparicio is a 66 year old female with a history of severe COPD (FEV1 24%) with baseline O2 need of 3 L, RLL adenocarcinoma s/p lobectomy (2/2016), RUL NSCLC s/p SBRT (01/2020), other pulmonary nodules, HTN, T2DM, and tobacco use who is admitted 5/20/22 for COPD exacerbation.      SW met with pt at bedside.  Pt reports the following information:  -Pt lives at an independent senior living building, no services  -CADI waiver services believes it is with [facility] in [facility] ([facility]); working on getting PCA services with her waiver  -Pt reports that she has portable O2 ([facility]?)  -Spoke with SW about her apartment being mold infested, her balcony not being ADA compliant, and not getting proper notices from manager -- SW added tenant rights to the discharge summary      SW/RNCC will follow.    SILVESTRE WilkesW, LSW  6B Intermediate Care Unit   [facility] [place]  Office: [phone]  Pager: [phone]  Fax: [phone]    For weekend social work needs, contact information below and available on [facility]:  4A, 4C, 4E, 5A, 5B  pager [phone]  6A, 6B, 6C, 6D                       pager [phone]  7A, 7B, 7C, 7D, 5C  pager [phone]     For weekend RN care coordinator needs (home discharge with needs including home care, assisted living facility returns, durable medical equip, IV antibiotics) - page [phone].

## 2022-05-23 NOTE — DISCHARGE INSTRUCTIONS
Free Legal Help for Renters  Information about the phaseout of the [place] COVID-19-based eviction moratorium is available here, and recordings of all of our COVID-19 rental issue webinars are here.     [facility] s tenant hotline remains open during the COVID-19 pandemic. However, our physical office is closed. Please call or email for legal advice about your tenancy.     [facility] provides free and low-cost legal, organizing, education, and advocacy services so that tenants throughout [place] can solve their own rental housing problems. We work to improve public and private policies relating to rental housing by involving affected tenants in the process.  For English, call [phone] or email your question to one of our housing attorneys.  Toll-free from [facility]: [phone]      [facility] ([facility]) provides free, high-quality legal help to low-income people in critical civil matters. For 113 years, we ve helped individuals and families secure and protect their basic needs, maintaining freedom from hunger, homelessness, sickness, and abuse. Because, justice matters.   To get help call [phone] or apply online  Hotline hours: Monday - Friday 9:00 a.m. - 11:45 a.m., 12:30 p.m. - 3:00 p.m. CST

## 2022-05-23 NOTE — PLAN OF CARE
Assumed care: 1500 - 2330    BP (!) 143/65 (BP Location: Right arm)   Pulse 117   Temp 97.9  F (36.6  C) (Oral)   Resp 19   Wt 95.7 kg (210 lb 15.7 oz)   SpO2 100%   BMI 34.05 kg/m        Shift summary:  Jenn Aparicio is alert and oriented.  Vital signs stable, on O2 3L, and afebrile.  Tachycardic, triggering sepsis.  Lactic acid 3.5 and 3.9.  RRT called and team made aware.  Orders for CXR, Bcx x 2, LR bolus 1L, RVP, Sputum culture (pt report sputum color change clear-->brown), and Strep pneumoniae urine antigen. Patient continues with sternal pain, medicated with tylenol and ibuprofen with poor results.  No nausea, vomiting, diarrhea.      BG elevated, 499 and 394 at HS.  Covered per ISS.  MD made aware.      Recent Labs   Lab 05/22/22  1705 05/22/22  1306   * 212*      Hemoglobin   Date Value Ref Range Status   05/22/2022 12.9 11.7 - 15.7 g/dL Final   06/25/2021 12.9 11.7 - 15.7 g/dL Final     Platelet Count   Date Value Ref Range Status   05/22/2022 236 150 - 450 10e3/uL Final   06/25/2021 169 150 - 450 10e9/L Final     WBC   Date Value Ref Range Status   06/25/2021 7.3 4.0 - 11.0 10e9/L Final     WBC Count   Date Value Ref Range Status   05/22/2022 12.9 (H) 4.0 - 11.0 10e3/uL Final     Potassium   Date Value Ref Range Status   05/22/2022 3.8 3.4 - 5.3 mmol/L Final   06/25/2021 3.7 3.4 - 5.3 mmol/L Final      Magnesium   Date Value Ref Range Status   05/22/2022 2.1 1.6 - 2.3 mg/dL Final   02/24/2021 2.0 1.6 - 2.3 mg/dL Final      Phosphorus   Date Value Ref Range Status   05/20/2022 2.9 2.5 - 4.5 mg/dL Final   02/25/2021 2.5 2.5 - 4.5 mg/dL Final     Goal Outcome Evaluation:    Problem: Plan of Care - These are the overarching goals to be used throughout the patient stay.    Goal: Plan of Care Review/Shift Note  Description: The Plan of Care Review/Shift note should be completed every shift.  The Outcome Evaluation is a brief statement about your assessment that the patient is improving, declining, or  "no change.  This information will be displayed automatically on your shift note.  Outcome: Ongoing, Progressing  Goal: Patient-Specific Goal (Individualized)  Description: You can add care plan individualizations to a care plan. Examples of Individualization might be:  \"Parent requests to be called daily at 9am for status\", \"I have a hard time hearing out of my right ear\", or \"Do not touch me to wake me up as it startles me\".  Outcome: Ongoing, Progressing  Goal: Absence of Hospital-Acquired Illness or Injury  Outcome: Ongoing, Progressing  Intervention: Identify and Manage Fall Risk  Recent Flowsheet Documentation  Taken 5/22/2022 1540 by Wander Torres RN  Safety Promotion/Fall Prevention:    assistive device/personal items within reach    clutter free environment maintained    fall prevention program maintained    nonskid shoes/slippers when out of bed  Intervention: Prevent Skin Injury  Recent Flowsheet Documentation  Taken 5/22/2022 1540 by Wander Torres, RN  Body Position: position changed independently  Intervention: Prevent and Manage VTE (Venous Thromboembolism) Risk  Recent Flowsheet Documentation  Taken 5/22/2022 1540 by Wander Torres RN  VTE Prevention/Management: SCDs (sequential compression devices) off  Activity Management: activity adjusted per tolerance  Goal: Optimal Comfort and Wellbeing  Outcome: Ongoing, Progressing  Intervention: Monitor Pain and Promote Comfort  Recent Flowsheet Documentation  Taken 5/22/2022 1825 by Wander Torres RN  Pain Management Interventions: medication (see MAR)  Taken 5/22/2022 1632 by Wander Torres RN  Pain Management Interventions: medication (see MAR)  Taken 5/22/2022 1540 by Wander Torres RN  Pain Management Interventions: medication (see MAR)  Goal: Readiness for Transition of Care  Outcome: Ongoing, Progressing  Intervention: Mutually Develop Transition Plan  Recent Flowsheet Documentation  Taken 5/22/2022 1600 by " Wander Torres, RN  Equipment Currently Used at Home:    walker, rolling    cane, straight     Problem: Breathing Pattern Ineffective  Goal: Effective Breathing Pattern  Outcome: Ongoing, Progressing  Intervention: Promote Improved Breathing Pattern  Recent Flowsheet Documentation  Taken 5/22/2022 1240 by Wander Torres, RN  Head of Bed (HOB) Positioning: HOB at 30-45 degrees

## 2022-05-23 NOTE — CONSULTS
Chronic Pulmonary Disease Specialist Consult   COPD Initial Interview    2022    Patient: Jenn Aparicio      :  1955                    MRN:[id number]      Reason for Consult:  Consulted to provide COPD education and optimize treatment regimen. Patient with very severe COPD being followed by COPD Readmission Reduction Program    History of Present Illness: 66-year-old patient with history of severe COPD, lung cancer (see below) who presents with 3 weeks of shortness of breath, acutely worsening in the last 3 days in the setting of multiple environmental triggers and disjointed medication use due to pharmacy issues.  Patient had completed COPD exacerbation treatment with short course prednisone, doxycycline per pulmonologist Dr. Spann after appt on 2022.  Patient reported only modest improvement with this.  Environmental triggers include home mold, recent painting in the home, secondhand smoke in apartment building.  Last tobacco use over 4 weeks ago; does not smoke with worsened dyspnea.    Most recent hospitalization and reason:  2021  COPD exacerbation, pneumonia         Smoking Hx:   Current 1-3 cigarette/day smoker. Counseling was completed.     Diagnosis of COPD:   Patient with very severe airway obstruction on PFT's and apically predominant centrilobular and paraseptal emphysema on CT scan.     Pulmonologist/Last office visit   Dr. Lynda Spann   2022    Most recent  PFT/interpretation on: 12/15/2020       FVC         1.32 L    44%   FEV1         0.57 L    24%   FEV1/FVC   (Ratio)                                                                44%   DLCO                        37%   Interpretation      Very Severe airway obstruction with significant bronchodilator response and severe diffusion defect.    Relevant Sleep Studies:  None   Had one virtual visit with sleep medicine in 2021 and OP sleep study scheduled for 2021. Patient cancelled appointment.    Home  Oxygen Use:   2 L continuous    DME- [facility]       Most recent CT:   5/20/2022    Increased clustered nodularity in the posterior left upper lobe. No pleural effusion or pneumothorax. Endobronchial mucous plugging in the left lower lobe. Apically predominant centrilobular and paraseptal emphysema. Right lower lobectomy changes.    Patient up-to-date on Annual Influenza/ Pneumococcal Vaccination/COVID:  She has had her COVID vaccine and has been boosted. She did not have her Influenza vaccine this year, but is up to date with Pneumococcal vaccine.     Pulmonary Rehab History:  none      Home respiratory medications include:      Albuterol MDI Q 4 prn  Duoneb Q 6 prn  Trelegy Ellipta Daily    Assessment:   Jenn was sitting up in the chair upon writer's entrance to her room. She was on 2 L with oxygen saturation of 92%. She was in no respiratory distress but was slightly winded as she had just completed an oxygen walk assessment.        Action:         Evaluated patients inspiratory flow using In-Check device:     --Low resistance setting: Patient able to generate 80 LPM, which is excessive inspiratory flow for her Albuterol rescue inhaler.  Albuterol inhalers require a slow inhalation of 20-60 LPM for optimal drug disposition with 5-10 second breath hold.      --Medium resistance setting: Patient able to generate 50 LPM, which is sufficient inspiratory flow for her current Trelegy Ellipta DPI.. Medium resistance inhalers require a fast and deep inhalation of 30-80LPM with 5-10 second breath hold.       -Evaluated patients' coordination and technique with inhaler: Patient demonstrated good technique with all of her inhalers.   Educated patient on proper inspiratory flows needed for all her inhalers.      Provided and instructed patient on proper use of Aerochamber spacer with inhaler; reiterated that spacer should always be used with her rescue inhaler.       -Patient able to generate a pressure of 10 cm H2O on  "Aerobika OPEP device for 2-3 seconds generating good strong loose non-productive cough. The goal for each breath, for a total of 3 sets of 10 breaths, is to exhale at a of pressure 10-20 for 3-4 seconds without fatigue. Educated patient to perform 2 to 3 'marcelino coughs'  to clear airway after each set. Shared that device can be used with or without nebs. Shared that consistent use of this device will help open smaller airways, improve mucus clearance, decrease cough frequency, and improve exercise tolerance.     Recommendations:  -Continue with current inpatient respiratory medication schedule.     -Will need follow up appointment with Pulmonologist and complete PFT's. (Saw Dr. Spann on 5/4/2022 so not sure she needs to be seen again).    -Use Aerobika Oscillating PEP Device for 3 sets of 10 breaths two times daily as directed above    -7 mg Nicotine Patch to help reduce symptoms of withdrawal and cravings   -Smoking Cessation Counseling and Relapse Prevention Consult (Done)  -2mg Nicotine Lozenges for cravings and urges  -Consider CT chest for lung cancer screening given smoking history    -Referral for OP sleep consult to assess for sleep disordered breathing, DAVID, nocturnal hypoxia, and hypoventilation    -Home Oxygen Assessment Testing 24-48 hours prior to discharge to determine O2 needs at rest and with exertion/activity. If the patient requires oxygen at rest, the walk test must be performed using the new baseline O2 to determine if patient needs more oxygen with activity.   -In the event patient qualifies for oxygen, at rest or with activity, please use the following verbiage in oxygen order: \"Please provide portable oxygen concentrator AND please test for oxygen conserving device using ____LPM to maintain sats between ____)   (Completed)  -Patient is a good candidate for COPD Action Plan due to high readmission risk    -At discharge continue with patient's home regimen of respiratory medications.      Will " continue to follow and support patient as needed. Will follow up with phone call 48 hours after discharge.     I spent 45 minutes with the patient.    SILVESTRE Mistry, RRT, CTTS  Chronic Pulmonary Disease Specialist  Office: [phone]   Pager: [phone]

## 2022-05-23 NOTE — PROVIDER NOTIFICATION
Text paged:  [facility] Team   24 hr in house Pager: [phone]     LUKE SOLORIO 394 @ , covered per ISS.  L.A improved to 3.0 with bolus.

## 2022-05-23 NOTE — PROVIDER NOTIFICATION
05/22/22 1700   Call Information   Date of Call 05/22/22   Time of Call 1708   Name of person requesting the team Wander   Title of person requesting team RN   RRT Arrival time 1711   Time RRT ended 1722   Reason for call   Type of RRT Adult   Primary reason for call Sepsis suspected   Sepsis Suspected Elevated Lactate level;Heart Rate > 100;WBC <4 or >12   Was patient transferred from the ED, ICU, or PACU within last 24 hours prior to RRT call? No   SBAR   Situation LA 3.5   Background 66 year old female with a history of severe COPD (FEV1 24%) with baseline O2 need of 3 L, RLL adenocarcinoma s/p lobectomy (2/2016), RUL NSCLC s/p SBRT (01/2020), other pulmonary nodules, HTN, T2DM, and tobacco use who is admitted 5/20/22 for COPD exacerbation.   Notable History/Conditions Cancer;COPD;Hypertension   Assessment awake alert and oriented.   Interventions Labs   Adjustments to Recommend LA scheduled for 2000   Patient Outcome   Patient Outcome Stabilized on unit   RRT Team   Date Attending Physician notified 05/22/22   Physician(s) Gloria Barth NP   Lead RN Gloria Hernandez   Post RRT Intervention Assessment   Post RRT Assessment Stable/Improved   Date Follow Up Done 05/22/22   Time Follow Up Done 8055   Comments LA 3.0

## 2022-05-23 NOTE — PLAN OF CARE
VSS on 3L O2. Tachy and HTN, received LR bolus for elevated lactic acid during evening. A&Ox4. SBA to the bathroom. Voiding spontaneously and no BM this shift. Endorsed generalized chest pain and given PRN Tylenol x1. Chest pain thought to be from frequent coughing. Fair, productive cough and still needs sputum sample. Expiratory wheezes and RUSSELL. LPIV SL.     Problem: Breathing Pattern Ineffective  Goal: Effective Breathing Pattern  Outcome: Ongoing, Progressing  Intervention: Promote Improved Breathing Pattern  Recent Flowsheet Documentation  Taken 5/23/2022 0000 by Eugenia Hinds, RN  Head of Bed (HOB) Positioning: HOB at 30-45 degrees      Goal Outcome Evaluation:    Plan of Care Reviewed With: patient     Overall Patient Progress: no change

## 2022-05-23 NOTE — PLAN OF CARE
"VSS, denies new or changing pain. SOB seems to be improved, and weaned to RA,  VFSS performed, showing esophageal dysmotility and hiatal hernia. No outstanding labs, except elevated blood sugars. Insulin dosage(s) changed - SEE MAR. 6-minute walk test completed. Senna administered post-VFSS with no BM as of time of notoe. PT assessed. RT assessed and administered nebs. COPD Educator visited.    Respiratory: LS crackles, RUSSELL, labored breathing, accessory muscle use; barrel chest; cough occasionally productive   -Pending sputum sample collection, no expectorant large enough to send to lab   -Deep breaths encouraged   -3L O2 NC baseline, continued (goal of 88-92% FiO2 per provider) until weaned to RA after walk test.  CV: Tachycardic 100s; generalized edema  GI: Rounded abdomen, last BM 5/20   -PRN Senna given x1  Musculoskeletal: Generalized weakness, use of assistive device   -Walker available and used      Problem: Plan of Care - These are the overarching goals to be used throughout the patient stay.    Goal: Readiness for Transition of Care  Outcome: Ongoing, Not Progressing     Problem: Plan of Care - These are the overarching goals to be used throughout the patient stay.    Goal: Plan of Care Review/Shift Note  Description: The Plan of Care Review/Shift note should be completed every shift.  The Outcome Evaluation is a brief statement about your assessment that the patient is improving, declining, or no change.  This information will be displayed automatically on your shift note.  Outcome: Ongoing, Progressing  Flowsheets (Taken 5/23/2022 1006)  Plan of Care Reviewed With: patient  Overall Patient Progress: no change  Goal: Patient-Specific Goal (Individualized)  Description: You can add care plan individualizations to a care plan. Examples of Individualization might be:  \"Parent requests to be called daily at 9am for status\", \"I have a hard time hearing out of my right ear\", or \"Do not touch me to wake me up as " "it startles me\".  Outcome: Ongoing, Progressing  Goal: Absence of Hospital-Acquired Illness or Injury  Outcome: Ongoing, Progressing  Intervention: Identify and Manage Fall Risk  Recent Flowsheet Documentation  Taken 5/23/2022 0822 by Shirin Alejo RN  Safety Promotion/Fall Prevention:    activity supervised    clutter free environment maintained    nonskid shoes/slippers when out of bed  Intervention: Prevent Skin Injury  Recent Flowsheet Documentation  Taken 5/23/2022 0822 by Shirin Alejo RN  Body Position: sitting up in bed  Intervention: Prevent and Manage VTE (Venous Thromboembolism) Risk  Recent Flowsheet Documentation  Taken 5/23/2022 0822 by Shirin Alejo RN  Activity Management:    activity adjusted per tolerance    up ad betsey  Intervention: Prevent Infection  Recent Flowsheet Documentation  Taken 5/23/2022 0822 by Shirin Alejo RN  Infection Prevention:    environmental surveillance performed    rest/sleep promoted  Goal: Optimal Comfort and Wellbeing  Outcome: Ongoing, Progressing  Intervention: Monitor Pain and Promote Comfort  Recent Flowsheet Documentation  Taken 5/23/2022 0822 by Shirin Alejo RN  Pain Management Interventions: medication (see MAR)     Problem: Breathing Pattern Ineffective  Goal: Effective Breathing Pattern  Outcome: Ongoing, Progressing   Goal Outcome Evaluation:    Plan of Care Reviewed With: patient     Overall Patient Progress: no change           "

## 2022-05-23 NOTE — CONSULTS
Nicotine Cessation Consult   2022    Patient: Jenn Aparicio      :  1955                    MRN:[id number]      History of Present Illness: 66-year-old patient with history of severe COPD, lung cancer (see below) who presents with 3 weeks of shortness of breath, acutely worsening in the last 3 days in the setting of multiple environmental triggers and disjointed medication use due to pharmacy issues.  Patient had completed COPD exacerbation treatment with short course prednisone, doxycycline per pulmonologist Dr. Spann after appt on 2022.  Patient reported only modest improvement with this.  Environmental triggers include home mold, recent painting in the home, secondhand smoke in apartment building.  Last tobacco use over 4 weeks ago; does not smoke with worsened dyspnea.    Reason for Consult:     Patient open to sharing about her tobacco use and in learning about more options and resources for quitting, staying quit, and in developing a relapse prevention plan.     Jenn is already a participant in [facility]'s tobacco cessation program. We reviewed her plan and talked about her continued challenges with smoking. She has cut back from 0.5 PPD to 1-3 cigarettes a day. Congratulated her on the progress she has made so far.    Types of Tobacco and Amount   Cigarettes 1-3 cig/day   E-Cigs    Smokeless Tobacco    Cigars    Pipes    Waterpipes      Patients last cigarette/vape/e-cig/smokeless tobacco:         Fagerstrom Test for Nicotine Dependence   How soon after waking do you smoke your first cigarette Within 5 minutes=3  5-30 minutes=2  31-60 minute=1  >61 minutes=0                 1   Do you find it difficult to refrain from smoking in places where it is forbidden? e.g. Sikhism, restaurants, etc? Yes=1  No=0              0   Which cigarette would you hate to give up? The first in the morning=1  Any other=0                      1        How many cigarettes a day do you smoke? 10 or  "less=0  11-20=1  21-30=2  31 or more=3       0   Do you smoke more frequently in the morning?   Yes=1  No=0                      0   Do you smoke even if you are sick in bed most of the day? Yes=1  No=0       0                                                                                                                                     Total Score                      2   SCORE 1-2 = Low Dependence   3-4 = Low to Mod Dependence    5-7 = Moderate Dependence      8+ = High Dependence     [place] Tobacco Withdrawal Scale   DSM-5 Symptoms 0 = none, 1 = slight, 2 = mild, 3 = moderate, 4 = severe   Desire or craving to smoke                      0   Anxious, nervous                      0    Depressed mood, sad                      0   Difficulty concentrating                      0   Increased appetite, hungry, weight gain                      0   Insomnia, sleep problems, awakening at night                                                                0   Restless                      0   Impatient                       0    Total Score                      0   Score:  1-8=Slight          8-16=Mild           16-24=moderate     24+=Severe     Stage of Behavior Change:   Pre-contemplation - No intention    Contemplation - Change on the horizon    Preparation - Getting Ready         X   Action - Consistently changed (within 6 months)    Maintenance - Staying quit (more than 6 months)    Relapse - Recycling      Patients Motivation to Quit Scale    Importance (1-10):         10   Readiness (1-10)         10   Confidence  (1-10):         10   Can Patient Imagine a Future without Smoking: yes   Quit Attempt Date:  5/20/2022   Final Quit Date:       Patients main reason(s) for smoking include: Boredom and stress    Top Reason(s) to quit smoking:   \"I want to live\"    Quit Attempts: several    Triggers: stress    Assessment Using Motivational Interviewing:     MI Intervention: Expressed Empathy/Understanding, Supported " "Autonomy, Collaboration, Evocation, Permission to raise concern or advise, Open-ended questions, Reflections: simple and complex, Change talk (evoked) and Reframe      Nicotine Dependence Score:     2  Withdrawal Score:       0    Assessment/Education/Recommendations    Education:    -Provided education on the safety of NRT, effects of stabilizing the brain to allow for behavior modification and increasing chances of quitting.   -Provided educational workbook, \"Quitting for Good with Treatment and Support.\"   -Discussed health risks of continued smoking.   -Provided motivation and encouragement.   -Shared that it's never too late to quit and that the benefits are immediate and profoundly impactful.   -Shared that slipping up here and there doesn't necessarily mean she failed; rather, it's an opportunity to reflect and modify her behaviors and habits and to employ alternate strategies to deal with strong triggers.    Recommendations:   -Encouraged patient to use workbook to help her understand why she smokes, come up with 5 reasons to quit, and to imagine what her future looks like without smoking.   -Encouraged her to develop strategies to distract herself to get past cravings.     Developed Smoking Cessation Relapse Prevention Plan that includes recommendations of:       --Discharge with 7 mg patch starter kit and continue to use daily, continue the initial patch for 4-6 weeks of smoking abstinence based on withdrawal symptoms. Taper every 4-6 weeks in 7 mg steps as tolerated.     -Discharge with 2 mg lozenges, take first thing in the morning and as needed for cravings and urges to smoke. May be used every 1-2 hours.     **The USPSTF recommends annual screening for lung cancer with low-dose computed tomography (LDCT) in adults aged 50 to 80 years who have a 20 pack-year smoking history and currently smoke or have quit within the past 15 years.    -Agrees to participate in our post-hosp smoking cessation follow-up " calls for treatment and support, starting at 48 hrs post discharge, then at 14 days, 1 month, and at 6 months.     Time Spent: I spent 10 minutes with patient. Will notify provider of recommendations.    Gave contact information and will call 48 hours after discharge to provide support.    SILVESTRE Mistry, RRT, CTTS  Chronic Pulmonary Disease Specialist  Office: [phone]   Pager: [phone]

## 2022-05-23 NOTE — CONSULTS
Discharge Pharmacy Test Claim    Patient has $0 copay through [employer] insurance for formulary medications.    Test Claim Copay   albuterol inhaler 0.00   breo ellipta 0.00   trelegy  0.00       Cleopatra Gregory  [facility] Pharmacy Liaison  Ph: [phone] Pager: [phone]

## 2022-05-24 VITALS
OXYGEN SATURATION: 97 % | BODY MASS INDEX: 34.05 KG/M2 | TEMPERATURE: 98.1 F | SYSTOLIC BLOOD PRESSURE: 147 MMHG | HEART RATE: 97 BPM | DIASTOLIC BLOOD PRESSURE: 80 MMHG | RESPIRATION RATE: 18 BRPM | WEIGHT: 210.98 LBS

## 2022-05-24 PROBLEM — R13.19 ESOPHAGEAL DYSPHAGIA: Status: ACTIVE | Noted: 2022-05-24

## 2022-05-24 LAB
ANION GAP SERPL CALCULATED.3IONS-SCNC: 6 MMOL/L (ref 3–14)
ATRIAL RATE - MUSE: 101 BPM
BUN SERPL-MCNC: 17 MG/DL (ref 7–30)
CALCIUM SERPL-MCNC: 9.6 MG/DL (ref 8.5–10.1)
CHLORIDE BLD-SCNC: 105 MMOL/L (ref 94–109)
CO2 SERPL-SCNC: 30 MMOL/L (ref 20–32)
CREAT SERPL-MCNC: 0.44 MG/DL (ref 0.52–1.04)
DIASTOLIC BLOOD PRESSURE - MUSE: NORMAL MMHG
ERYTHROCYTE [DISTWIDTH] IN BLOOD BY AUTOMATED COUNT: 13.4 % (ref 10–15)
GFR SERPL CREATININE-BSD FRML MDRD: >90 ML/MIN/1.73M2
GLUCOSE BLD-MCNC: 122 MG/DL (ref 70–99)
GLUCOSE BLDC GLUCOMTR-MCNC: 118 MG/DL (ref 70–99)
GLUCOSE BLDC GLUCOMTR-MCNC: 249 MG/DL (ref 70–99)
HCT VFR BLD AUTO: 39.4 % (ref 35–47)
HGB BLD-MCNC: 12.4 G/DL (ref 11.7–15.7)
INTERPRETATION ECG - MUSE: NORMAL
MCH RBC QN AUTO: 28.4 PG (ref 26.5–33)
MCHC RBC AUTO-ENTMCNC: 31.5 G/DL (ref 31.5–36.5)
MCV RBC AUTO: 90 FL (ref 78–100)
P AXIS - MUSE: 85 DEGREES
PLATELET # BLD AUTO: 233 10E3/UL (ref 150–450)
POTASSIUM BLD-SCNC: 3.2 MMOL/L (ref 3.4–5.3)
POTASSIUM BLD-SCNC: 3.4 MMOL/L (ref 3.4–5.3)
PR INTERVAL - MUSE: 148 MS
QRS DURATION - MUSE: 82 MS
QT - MUSE: 338 MS
QTC - MUSE: 438 MS
R AXIS - MUSE: 65 DEGREES
RBC # BLD AUTO: 4.37 10E6/UL (ref 3.8–5.2)
SODIUM SERPL-SCNC: 141 MMOL/L (ref 133–144)
SYSTOLIC BLOOD PRESSURE - MUSE: NORMAL MMHG
T AXIS - MUSE: 66 DEGREES
VENTRICULAR RATE- MUSE: 101 BPM
WBC # BLD AUTO: 10.5 10E3/UL (ref 4–11)

## 2022-05-24 PROCEDURE — 99239 HOSP IP/OBS DSCHRG MGMT >30: CPT | Mod: GC | Performed by: FAMILY MEDICINE

## 2022-05-24 PROCEDURE — 94640 AIRWAY INHALATION TREATMENT: CPT

## 2022-05-24 PROCEDURE — 250N000012 HC RX MED GY IP 250 OP 636 PS 637: Performed by: STUDENT IN AN ORGANIZED HEALTH CARE EDUCATION/TRAINING PROGRAM

## 2022-05-24 PROCEDURE — 84132 ASSAY OF SERUM POTASSIUM: CPT | Performed by: STUDENT IN AN ORGANIZED HEALTH CARE EDUCATION/TRAINING PROGRAM

## 2022-05-24 PROCEDURE — 250N000013 HC RX MED GY IP 250 OP 250 PS 637: Performed by: STUDENT IN AN ORGANIZED HEALTH CARE EDUCATION/TRAINING PROGRAM

## 2022-05-24 PROCEDURE — 80048 BASIC METABOLIC PNL TOTAL CA: CPT | Performed by: STUDENT IN AN ORGANIZED HEALTH CARE EDUCATION/TRAINING PROGRAM

## 2022-05-24 PROCEDURE — 36415 COLL VENOUS BLD VENIPUNCTURE: CPT | Performed by: STUDENT IN AN ORGANIZED HEALTH CARE EDUCATION/TRAINING PROGRAM

## 2022-05-24 PROCEDURE — 94640 AIRWAY INHALATION TREATMENT: CPT | Mod: 76

## 2022-05-24 PROCEDURE — 250N000009 HC RX 250: Performed by: STUDENT IN AN ORGANIZED HEALTH CARE EDUCATION/TRAINING PROGRAM

## 2022-05-24 PROCEDURE — 85027 COMPLETE CBC AUTOMATED: CPT | Performed by: STUDENT IN AN ORGANIZED HEALTH CARE EDUCATION/TRAINING PROGRAM

## 2022-05-24 PROCEDURE — 250N000011 HC RX IP 250 OP 636: Performed by: STUDENT IN AN ORGANIZED HEALTH CARE EDUCATION/TRAINING PROGRAM

## 2022-05-24 PROCEDURE — 999N000157 HC STATISTIC RCP TIME EA 10 MIN

## 2022-05-24 RX ORDER — PANTOPRAZOLE SODIUM 40 MG/1
40 TABLET, DELAYED RELEASE ORAL
Qty: 30 TABLET | Refills: 0 | Status: SHIPPED | OUTPATIENT
Start: 2022-05-25 | End: 2022-06-24

## 2022-05-24 RX ORDER — AMOXICILLIN 250 MG
1 CAPSULE ORAL 2 TIMES DAILY PRN
Qty: 30 TABLET | Refills: 0 | Status: SHIPPED | OUTPATIENT
Start: 2022-05-24 | End: 2022-05-24

## 2022-05-24 RX ORDER — PREDNISONE 20 MG/1
40 TABLET ORAL DAILY
Qty: 2 TABLET | Refills: 0 | Status: SHIPPED | OUTPATIENT
Start: 2022-05-25 | End: 2022-06-01

## 2022-05-24 RX ADMIN — ENOXAPARIN SODIUM 40 MG: 40 INJECTION SUBCUTANEOUS at 07:47

## 2022-05-24 RX ADMIN — OLOPATADINE HYDROCHLORIDE 1 DROP: 1 SOLUTION OPHTHALMIC at 07:50

## 2022-05-24 RX ADMIN — INSULIN ASPART 3 UNITS: 100 INJECTION, SOLUTION INTRAVENOUS; SUBCUTANEOUS at 12:45

## 2022-05-24 RX ADMIN — PANTOPRAZOLE SODIUM 40 MG: 40 TABLET, DELAYED RELEASE ORAL at 07:47

## 2022-05-24 RX ADMIN — PREDNISONE 40 MG: 20 TABLET ORAL at 07:45

## 2022-05-24 RX ADMIN — GLIPIZIDE 5 MG: 5 TABLET ORAL at 07:46

## 2022-05-24 RX ADMIN — GUAIFENESIN 1200 MG: 600 TABLET ORAL at 07:47

## 2022-05-24 RX ADMIN — IPRATROPIUM BROMIDE 0.5 MG: 0.5 SOLUTION RESPIRATORY (INHALATION) at 12:03

## 2022-05-24 RX ADMIN — LEVALBUTEROL HYDROCHLORIDE 1.25 MG: 1.25 SOLUTION RESPIRATORY (INHALATION) at 08:08

## 2022-05-24 RX ADMIN — UMECLIDINIUM 1 PUFF: 62.5 AEROSOL, POWDER ORAL at 07:50

## 2022-05-24 RX ADMIN — AMLODIPINE BESYLATE 10 MG: 5 TABLET ORAL at 07:45

## 2022-05-24 RX ADMIN — LEVALBUTEROL HYDROCHLORIDE 1.25 MG: 1.25 SOLUTION RESPIRATORY (INHALATION) at 12:02

## 2022-05-24 RX ADMIN — ASPIRIN 81 MG: 81 TABLET, COATED ORAL at 07:45

## 2022-05-24 RX ADMIN — FLUTICASONE PROPIONATE 1 SPRAY: 50 SPRAY, METERED NASAL at 07:49

## 2022-05-24 RX ADMIN — LEVALBUTEROL HYDROCHLORIDE 1.25 MG: 1.25 SOLUTION RESPIRATORY (INHALATION) at 01:22

## 2022-05-24 RX ADMIN — CETIRIZINE HYDROCHLORIDE 10 MG: 10 TABLET, FILM COATED ORAL at 07:47

## 2022-05-24 RX ADMIN — POLYETHYLENE GLYCOL 400 AND PROPYLENE GLYCOL 1 DROP: 4; 3 SOLUTION/ DROPS OPHTHALMIC at 07:53

## 2022-05-24 RX ADMIN — POLYETHYLENE GLYCOL 400 AND PROPYLENE GLYCOL 1 DROP: 4; 3 SOLUTION/ DROPS OPHTHALMIC at 12:47

## 2022-05-24 RX ADMIN — NICOTINE 1 PATCH: 14 PATCH, EXTENDED RELEASE TRANSDERMAL at 07:56

## 2022-05-24 RX ADMIN — FLUTICASONE FUROATE AND VILANTEROL TRIFENATATE 1 PUFF: 200; 25 POWDER RESPIRATORY (INHALATION) at 07:50

## 2022-05-24 RX ADMIN — IPRATROPIUM BROMIDE 0.5 MG: 0.5 SOLUTION RESPIRATORY (INHALATION) at 08:08

## 2022-05-24 ASSESSMENT — ACTIVITIES OF DAILY LIVING (ADL)
ADLS_ACUITY_SCORE: 38

## 2022-05-24 NOTE — DISCHARGE SUMMARY
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  No follow up labs or test are needed.             Unresulted Labs Ordered in the Past 30 Days of this Admission     Date and Time Order Name Status Description    5/22/2022  5:51 PM Blood Culture Hand, Right Preliminary     5/22/2022  5:51 PM Blood Culture Hand, Left Preliminary       These results will be followed up by ordering provider    Discharge Disposition   Discharged to home  Condition at discharge: Stable    Hospital Course   Jenn Aparicio is a 66 year old female with a history of severe COPD (FEV1 24%) with baseline O2 need of 3 L, RLL adenocarcinoma s/p lobectomy (2/2016), RUL NSCLC s/p SBRT (01/2020), other pulmonary nodules, HTN, T2DM, and tobacco use who is admitted 5/20/22 for COPD exacerbation.      The following problems were addressed during her hospitalization:    # Acute on chronic hypoxic respiratory failure, resolved    # Dyspnea at rest and with exertion, improved  # Productive cough, improved  # COPD, severe (FEV1 24%)  # Baseline O2 need of 3 LPM  # Tobacco use  66-year-old with history of severe COPD and lung cancer who presented with 3 weeks of shortness of breath, acutely worsening in the preceding 3 days in the setting of multiple environmental triggers (mold in apt recent painting in the home, secondhand smoke) and disjointed medication use due to pharmacy issues.  " Patient had completed COPD exacerbation treatment with short course prednisone, doxycycline per pulmonologist Dr. Spann after appt on 5/4/2022.  Patient reported only modest improvement with this. Last tobacco use 4 weeks prior; does not smoke with worsened dyspnea. Other associated symptoms include productive cough, intermittent dizziness, hypoxia down to the 60s-70s with exertion on baseline O2 of 3 LPM, CP. During her hospitalization she did not have fever, no leukocytosis and imaging was not concerning for bacterial pneumonia. Symptoms improved with prednisone, azithromycin (5/21-5/23), neb treatments and mag sulfate and she was able to tolerate wean down to room air both at rest and with activity (passed 6MW without needing O2). COPD team was consulted and no changes were made to her home regimen. Smoking cessation discussed.    - No changes made to home COPD medications  - Prednisone 40 mg PO daily x5 days (5/21 - 5/25)  - Mucolytic: guaifenesin 1200 mg BID  - Flonase bilaterally daily  - Cetirizine 10 mg daily  - Follow up with Pulmonology and complete PFTs    - Will be important establish reliable outpatient pharmacy for future refills to prevent readmission.      # Non-cardiac chest pain  # Dysphagia  # Hiatal hernia  # Esophageal dysmotility  Patient reported midsternal chest pain and globus sensation throughout hospitalization, leading to decreased PO intake. Negative ACS work up. Swallow study normal. Esophagram obtained 5/23 showed small hiatal hernia with reflux and tertiary contractions suggestive of esophageal dysmotility. Recommend outpatient follow up with GI motility clinic for further evaluation and management.      # NSCLC, adenocarcinoma, RUL  # RLL nodule  # ROBERT nodule, new  # S/p RL lobectomy 2016  From 3/31/22 oncology note: \"NSCLC, adeno, RUL: Now just over 2 years after SBRT. CT was personally reviewed. New RLL nodule is slightly longer. I'm still not sure whether it represents a " "malignancy or not. It would be difficult to bx. The ROBERT nodule is tiny. Both are still a little small for a PET to be useful.\" Plan was for repeat CT in 6 weeks (5/4/22), which demonstrated stable RUL nodule and stable ROBERT nodule but with \"new associated patchy irregular adjacent nodules and ill-defined opacities, unclear if cancer progression vs infection vs inflammatory.\" Oncologist Dr. Leung had called pt on 5/13 (left VM) to recommend repeat CT chest non-contrast in 6-8 weeks to follow up these findings, which are suspected to be inflammatory.   - Follow up with Dr. Leung    # Tachycardia, improved   Triggered sepsis evaluation with tachycardia and hypoxia in the setting of frequent nebs. Did have elevated lactate with was attributed to albuterol. ECG on admission showed sinus tach.      # Deconditioning  In the setting of chronic illness. PT/OT evaluations during hospitalization, felt safe for discharge to home.      # T2DM  Last A1C 8.4 in 01/2022, up from 7.1 6 years ago. She received glargine 12u while on prednisone (5/21-5/25), which was discontinued on discharge. She continued PTA glipizide 5 mg BID AC.    - Patient has $0 copay for jardiance and victoza - consider initiation as outpatient      Chronic, stable:  # HTN: well controlled on PTA amlodipine 10 mg   # Allergic conjunctivitis: olopatadine 1 drop bilaterally daily  # Dry eye: PEG drops 1 drop bilateral daily  # Suspected DAVID: Previously evaluated by Sleep Medicine (3/2021) with recommendation for lab sleep study given likely DAVID (STOP BANG 4/8). Has not yet had this evaluation - recommend outpatient.     Consultations This Hospital Stay   IP RESPIRATORY CARE CHRONIC PULMONARY DISEASE SPECIALIST  PHYSICAL THERAPY ADULT IP CONSULT  OCCUPATIONAL THERAPY ADULT IP CONSULT  NUTRITION SERVICES ADULT IP CONSULT  PHARMACY LIAISON FOR MEDICATION COVERAGE CONSULT  SPEECH LANGUAGE PATH ADULT IP CONSULT  CARE MANAGEMENT / SOCIAL WORK IP CONSULT  SMOKING " CESSATION PROGRAM IP CONSULT  SMOKING CESSATION PROGRAM IP CONSULT    Code Status   Full Code       The patient was discussed with MD Maryse Underwood's Service  [facility] [address] [facility]  [address] [place] [id number]  Phone: [phone]  Fax: [phone]  ______________________________________________________________________    Physical Exam   Vital Signs: Temp: 98.1  F (36.7  C) Temp src: Oral BP: (!) 147/80 Pulse: 97   Resp: 18 SpO2: 97 % O2 Device: Nasal cannula Oxygen Delivery: 1 LPM  Weight: 210 lbs 15.68 oz    Physical Exam  Constitutional:       General: She is not in acute distress.     Appearance: She is well-developed. She is not diaphoretic.   Cardiovascular:      Rate and Rhythm: Normal rate.   Pulmonary:      Effort: Pulmonary effort is normal. No respiratory distress.      Breath sounds: Wheezing (end expiratory, improved compared to yesterday) present.      Comments: Absent breath sounds RLL (prior resection)  Skin:     General: Skin is warm.      Coloration: Skin is not jaundiced.   Neurological:      General: No focal deficit present.      Mental Status: She is alert.   Psychiatric:         Mood and Affect: Mood normal.         Primary Care Physician   Mitzi Nettles    Discharge Orders      Medication Therapy Management Referral      Physical Therapy Referral      Adult Gastro Ref - Consult Only      Reason for your hospital stay    COPD exacerbation     Activity    Your activity upon discharge: activity as tolerated     Follow Up and recommended labs and tests    Follow up with primary care provider, Mitzi Nettles, within 7 days for hospital follow- up.  No follow up labs or test are needed.     Diet    Follow this diet upon discharge: Orders Placed This Encounter      Snacks/Supplements Adult: Ensure Max Protein (bariatric); With Meals      Combination Diet Regular Diet Adult       Significant Results and Procedures    Most Recent 3 CBC's:  Recent Labs   Lab Test 05/24/22  0404 05/23/22  0500 05/22/22  0501   WBC 10.5 11.6* 12.9*   HGB 12.4 13.0 12.9   MCV 90 92 90    235 236     Most Recent 3 BMP's:  Recent Labs   Lab Test 05/24/22  1220 05/24/22  1010 05/24/22  0728 05/24/22  0404 05/23/22  0821 05/23/22  0500 05/22/22  0753 05/22/22  0501   NA  --   --   --  141  --  141  --  140   POTASSIUM  --  3.4  --  3.2*  --  3.6  --  3.8   CHLORIDE  --   --   --  105  --  104  --  103   CO2  --   --   --  30  --  30  --  28   BUN  --   --   --  17  --  20  --  19   CR  --   --   --  0.44*  --  0.47*  --  0.48*   ANIONGAP  --   --   --  6  --  7  --  9   LUIGI  --   --   --  9.6  --  9.3  --  9.3   *  --  118* 122*   < > 233*   < > 235*    < > = values in this interval not displayed.     Most Recent 3 Troponin's:  Recent Labs   Lab Test 06/22/21  1804 06/22/21  1258 02/24/21  1544   TROPI <0.015 <0.015 <0.015     Most Recent 3 BNP's:  Recent Labs   Lab Test 06/22/21  1258 02/23/21  2021   NTBNPI 19 39     Most Recent D-dimer:  Recent Labs   Lab Test 05/20/22  1836   DD <0.27     Most Recent 6 glucoses:  Recent Labs   Lab Test 05/24/22  1220 05/24/22  0728 05/24/22  0404 05/23/22  2136 05/23/22  1641 05/23/22  1253   * 118* 122* 352* 405* 249*     Most Recent ESR & CRP:  Recent Labs   Lab Test 05/22/22  0501   CRP <2.9   ,   Results for orders placed or performed during the hospital encounter of 05/20/22   Chest XR,  PA & LAT    Narrative    EXAMINATION: XR CHEST 2 VW, 5/20/2022 7:25 PM    INDICATION: shortness of breath    COMPARISON: 5/4/2022    FINDINGS: PA and lateral upright views of the chest.     Surgical clips within the mediastinum. Trachea is midline. The  cardiomediastinal silhouette is within normal limits. Unchanged  blunting of the right costophrenic angle. No pneumothorax.  Emphysematous change. Persistent reticular nodular opacities in the  left midlung and reticular opacities in the peripheral right  upper  lobe. No new focal airspace opacity.    The partially visualized upper abdomen is unremarkable. No acute  osseous abnormality. Demineralized appearance. Soft tissue is within  normal limits.      Impression    IMPRESSION:  1. Persistent reticular nodular opacities in the left midlung and  reticular opacities in the peripheral right upper lobe, better seen on  comparison CT. No new focal airspace opacity.  2. Unchanged blunting of the right costophrenic angle corresponding to  pleural thickening on comparison CT.         I have personally reviewed the examination and initial interpretation  and I agree with the findings.    LUIS ANTONIO MEDEL DO         SYSTEM ID:  [id number]   POC US ECHO LIMITED    Impression     Procedure Note      Limited Bedside ED Cardiac Ultrasound:    PROCEDURE: PERFORMED BY: Dr. Kimi Lewis MD  INDICATIONS/SYMPTOM:  Chest Pain and Shortness of Breath  PROBE: Cardiac phased array probe  BODY LOCATION: Chest  FINDINGS:   The ultrasound was performed utilizing the subcostal and parasternal long axis views.  Cardiac contractility:  Present  Gross estimation of cardiac kinesis: normal  Pericardial Effusion:  None  RV:LV ratio: Equal  IVC:    Diameter:  IVC diameter expiration (IVCe) <2 cm                                                   IVC diameter inspiration (IVCi)  <2 cm                                                       Collapsibility:  IVC collapses < 50% with inspiration  INTERPRETATION:    EF grossly preserved, RV and LV equal in size,  No pericardial effusion was found.  IVC visualized and findings indicate normovolemia.  IMAGE DOCUMENTATION: Images were archived to PACs system.         CT Chest Pulmonary Embolism w Contrast    Narrative    EXAMINATION: CTA pulmonary angiogram, 5/20/2022 9:03 PM     COMPARISON: Chest x-ray 5/20/2022, CT chest abdomen pelvis 5/4/2022,  chest CT 3/31/2022    HISTORY: PE suspected, high probability    Additional history from the  electronic medical record: Patient has  history of non-small cell lung cancer. Adenocarcinoma of the right  lung.    TECHNIQUE: Volumetric helical acquisition of CT images of the chest  from the lung apices to the kidneys were acquired after the  administration of 80 mL of Isovue-370 IV contrast. Flash technique  with free breathing acquisition.  Post-processed multiplanar and/or  MIP reformations were obtained, archived to PACS and used in  interpretation of this study.     FINDINGS:  Contrast bolus is: excellent.  Exam is negative for acute  pulmonary embolism.    The largest right ventricle transaxial diameter is (measured from  endocardium to endocardium): 3.4 cm   The largest left ventricle transaxial diameter is (measured from  endocardium to endocardium): 3.2 cm  RV/LV ratio is: 1.1 (if ratio greater than 1.1 then sign is suspicious  for right heart strain)  Reflux of contrast into the IVC? no  Paradoxical bowing of the interventricular septum to the left? no  Pericardial effusion?:not present    The central tracheobronchial tree is patent. Stable solid 6 mm nodule  in the posterior subpleural right upper lobe (series 8 image 94)  surrounded by reticular opacities, 5 mm nodule in the posterior right  upper lobe (series 8 image 112), and 9 mm nodule just inferior (series  8 image 122). Increased clustered nodularity in the posterior left  upper lobe. No pleural effusion or pneumothorax. Endobronchial mucous  plugging in the left lower lobe. Apically predominant centrilobular  and paraseptal emphysema. Right lower lobectomy changes.    Normal heart size. No pericardial effusion. Normal caliber ascending  aorta and main pulmonary artery. Slightly reduced mediastinal lymph  nodes. For example 9 mm lymph node in the AP window, previously 1 cm  (series 7 image 102).    Upper abdomen: Limited. No acute pathology.    Bones: No acute or suspicious osseous lesions.      Impression    IMPRESSION:   1. Exam is negative  for acute pulmonary embolism.    2. Slightly increased (since 5/4/2022) clustered nodularity in the  posterior left upper lobe, favored to be infectious/inflammatory given  absence of any similar finding on 3/31/2022, though malignancy remains  a consideration. Stable right upper lobe pulmonary nodules. Recommend  attention on short-term follow-up.    In the event of a positive result for acute pulmonary embolism  resulting in right heart strain, please activate the [facility]  Multidisciplinary group for consultation by paging [phone]).     [facility] -- Pulmonary Embolism Response Team (Multidisciplinary team  including cardiology, interventional radiology, critical care,  hematology)    I have personally reviewed the examination and initial interpretation  and I agree with the findings.    LUIS ANTONIO MEDEL DO         SYSTEM ID:  [id number]   XR Esophagram    Narrative    EXAM: XR ESOPHAGRAM, 5/23/2022 11:57 AM    INDICATION: Dysphagia in the setting of oncologic process    COMPARISON: CT chest 5/20/2022    TECHNIQUE: Multiple fluoroscopic images of the esophagus were obtained  after oral administration of liquid barium in the AP and RPO position  with head of the head elevated approximately 30 degrees.    Total fluoroscopy time: 1.5 minutes.    FINDINGS:   The esophagus demonstrates tertiary contractions suggestive of  dysmotility. There is no evidence of intrinsic or extrinsic mass or  polyp. No constricting or obstructing lesion is identified. There is  no mucosal ulceration. Mucosal folds are normal in appearance. Small  hiatal hernia. Spontaneous gastroesophageal reflux is visualized at  the level of the clavicle in the RPO position with head elevated  approximately 30 degrees.      Impression    IMPRESSION: Small hiatal hernia with spontaneous gastroesophageal  reflux up to the level of the clavicle is visualized. Tertiary  contractions suggestive of esophageal dysmotility.    I, EVAN PANDA MD, attest that  I was present for all critical  portions of the procedure and was immediately available to provide  guidance and assistance during the remainder of the procedure.    I have personally reviewed the examination and initial interpretation  and I agree with the findings.    EVAN PANDA MD         SYSTEM ID:  [id number]   XR Chest Port 1 View    Narrative    Exam: XR CHEST PORT 1 VIEW, 5/22/2022 6:19 PM    Indication: Vital signs triggering sepsis with elevated lactic acid    Comparison: 5/20/2022    Findings:   Mediastinal surgical clips. The cardiomediastinal silhouette and  pulmonary vasculature are within normal limits. No pleural effusion or  pneumothorax. Stable nodular opacities in the left midlung. No new  focal airspace opacity. Stable lateral right basilar pleural  thickening. Bilateral emphysematous change.      Impression    Impression: Stable nodular opacities in the left midlung, better  characterized on comparison CT. No new focal airspace opacity.    LUIS ANTONIO MEDEL DO         SYSTEM ID:  [id number]       Discharge Medications   Discharge Medication List as of 5/24/2022  1:08 PM      START taking these medications    Details   pantoprazole (PROTONIX) 40 MG EC tablet Take 1 tablet (40 mg) by mouth every morning (before breakfast), Disp-30 tablet, R-0, E-Prescribe         CONTINUE these medications which have CHANGED    Details   Fluticasone-Umeclidin-Vilanterol (TRELEGY ELLIPTA) 200-62.5-25 MCG/INH oral inhaler Inhale 1 puff into the lungs daily, Disp-28 each, R-0, E-Prescribe      predniSONE (DELTASONE) 20 MG tablet Take 2 tablets (40 mg) by mouth daily for 1 day, Disp-2 tablet, R-0, E-Prescribe         CONTINUE these medications which have NOT CHANGED    Details   albuterol (PROAIR HFA/PROVENTIL HFA/VENTOLIN HFA) 108 (90 Base) MCG/ACT inhaler INHALE 1 OR 2 PUFFS BY MOUTH EVERY 4 HOURS AS NEEDED, Disp-18 g, R-3, E-PrescribePharmacy may dispense brand covered by insurance (Proair, or proventil or ventolin  or generic albuterol inhaler)      alcohol swab prep pads Use to swab area of injection/xander as directed.Disp-100 each, [id number]      Alpha-Lipoic Acid 300 MG CAPS Take 300 mg by mouth daily, Disp-90 capsule, R-3, E-PrescribePatient had medications at multiple pharmacies and wants to switch over to mail order.      amLODIPine (NORVASC) 10 MG tablet Take 1 tablet (10 mg) by mouth daily, Disp-90 tablet, R-3, E-PrescribePatient had medications at multiple pharmacies and wants to switch over to mail order.      aspirin 81 MG EC tablet Take 1 tablet (81 mg) by mouth daily, Disp-90 tablet, R-3, E-PrescribePatient had medications at multiple pharmacies and wants to switch over to mail order.      benzonatate (TESSALON) 200 MG capsule Take 1 capsule (200 mg) by mouth 3 times daily as needed for cough, Disp-100 capsule, R-3, E-Prescribe      cetirizine (ZYRTEC) 10 MG tablet Take 1 tablet (10 mg) by mouth daily, Disp-30 tablet, R-10, E-Prescribe      coenzyme Q-10 (CO-Q10) 50 MG capsule Take 1 capsule (50 mg) by mouth daily, Disp-90 capsule, R-3, E-PrescribePatient had medications at multiple pharmacies and wants to switch over to mail order.      Continuous Blood Gluc Sensor (FREESTYLE GIOVANNI 14 DAY SENSOR) MISC Change every 14 days., Disp-2 each, R-11, E-Prescribe      cyclobenzaprine (FLEXERIL) 5 MG tablet Take 1 tablet (5 mg) by mouth 3 times daily as needed for muscle spasms, Disp-30 tablet, R-0, E-PrescribePatient had medications at multiple pharmacies and wants to switch over to mail order.      doxycycline hyclate (VIBRAMYCIN) 100 MG capsule Take 100 mg twice daily for five days if needed for treatment of a COPD exacerbation. Use alongside prednisone., Disp-10 capsule, R-0, E-Prescribe      Easy Comfort Lancets MISC Historical      fluticasone (FLONASE) 50 MCG/ACT nasal spray Spray 1 spray into both nostrils daily Use at night before bed, Disp-15.8 mL, R-1, E-Prescribe      fluticasone-vilanterol (BREO ELLIPTA)  200-25 MCG/INH inhaler Inhale 1 puff into the lungs daily, Disp-1 each, R-11, E-Prescribe      glipiZIDE (GLUCOTROL) 5 MG tablet Take 1 tablet (5 mg) by mouth 2 times daily (before meals), Disp-180 tablet, R-3, E-PrescribePatient had medications at multiple pharmacies and wants to switch over to mail order.      glucosamine-chondroitin 500-400 MG tablet Take 1 tablet by mouth daily, Disp-90 tablet, R-3, E-PrescribePatient had medications at multiple pharmacies and wants to switch over to mail order.      ipratropium - albuterol 0.5 mg/2.5 mg/3 mL (DUONEB) 0.5-2.5 (3) MG/3ML neb solution Take 1 vial (3 mLs) by nebulization every 6 hours as needed for shortness of breath / dyspnea or wheezing, Disp-1080 mL, R-0, E-Prescribe      ketotifen (ZADITOR) 0.025 % ophthalmic solution INSTILL 1 DROP IN BOTH EYES TWICE DAILY AS NEEDED, Historical      Lancet Devices (EASY MINI EJECT LANCING DEVICE) MISC Historical      multivitamin w/minerals (THERA-VIT-M) tablet Take 1 tablet by mouth daily, Disp-30 tablet, R-11, E-PrescribePatient had medications at multiple pharmacies and wants to switch over to mail order.      olopatadine (PATADAY) 0.2 % ophthalmic solution Place 1 drop into both eyes daily, Disp-2.5 mL, R-11, E-PrescribePatient had medications at multiple pharmacies and wants to switch over to mail order.      omega-3 acid ethyl esters (LOVAZA) 1 g capsule Historical      Omega-3-Acid Eth Est, Dietary, 1 g CAPS Take 2 capfuls by mouth 2 times daily, Disp-360 capsule, R-1, E-PrescribePatient had medications at multiple pharmacies and wants to switch over to mail order.      polyethylene glycol-propylene glycol (SYSTANE) 0.4-0.3 % SOLN ophthalmic solution Place 1 drop into both eyes 4 times daily, Disp-5 mL, R-11, E-PrescribePatient had medications at multiple pharmacies and wants to switch over to mail order.      sodium chloride (NEBUSAL) 3 % neb solution Take 3 mLs by nebulization daily Use 1-2 times daily in combination  with Duoneb and Aerobika to help with mucus clearance, Disp-250 mL, R-3, E-Prescribe      umeclidinium (INCRUSE ELLIPTA) 62.5 MCG/INH inhaler Inhale 1 puff into the lungs daily, Disp-1 each, R-3, E-Prescribe         STOP taking these medications       nicotine (SM NICOTINE) 14 MG/24HR 24 hr patch Comments:   Reason for Stopping:         senna-docusate (SENOKOT-S/PERICOLACE) 8.6-50 MG tablet Comments:   Reason for Stopping:             Allergies   Allergies   Allergen Reactions     Interferons Dermatitis     Penicillins Hives     Aspirin      325mg      Colon Care

## 2022-05-24 NOTE — PROGRESS NOTES
Patient has been assessed for Home Oxygen by a credentialed professional during activity/exercise and in a chronic stable state.    (Activity/Exercise should mimic patient s home activity/exercise and ADLs)     1) Resting saturation on Room Air: 96%  2) Resting saturation on O2: 97% on 1 LPM   3) Resting saturation on 4 LPM O2: 98% (Required only if >4 LPM is prescribed or required to keep sats >88%. Delete row if not needed.)     4) Activity/Exercise saturation on Room Air: 92%   5) Activity/Exercise saturation on O2: 93% on 1 LPM   6) Activity/Exercise saturation on 4 LPM O2: 99% (Required only if >4 LPM is prescribed or required to keep sats > 88%. Delete row if not needed.)     Please notify patient s RN Care Coordinator when you ve completed this dot phrase.

## 2022-05-24 NOTE — PROGRESS NOTES
Care Management Follow Up    Length of Stay (days): 4    Expected Discharge Date: TBD     Concerns to be Addressed: Discharge planning, medical readiness.       Patient plan of care discussed at interdisciplinary rounds: Yes    Anticipated Discharge Disposition:  Home  Anticipated Discharge Services:  OP PT  Anticipated Discharge DME:  Home O2?    Education Provided on the Discharge Plan: Yes  Patient/Family in Agreement with the Plan: Yes    Referrals Placed by CM/SW: OP PT  Private pay costs discussed: Not applicable    Additional Information:  Per chart review, the bedside nurse completed a home O2 walk test and the patient did desat below 88% on room air at rest or with activity, and therefore doesn't technically need supplemental O2 at this specific time, provider updated. Writer contacted patient to discuss discharge planning, patient confirmed that she has home O2 through [facility] [employer] and notes that she will likely need it upon return home, as her apartment has mold and it interferes w/her breathing (SW has provided tenant rights information to patient's discharge instructions). Patient confirms that she has a home nebulizer. Patient has been evaluated by PT, recommending home w/HH services, but patient prefers OP PT. OP PT order placed at this time.     1143 Addendum:  Confirmed w/the primary team, patient will discharge to home today. Patient will contact [facility] when she feels that she no longer needs O2. Patient has contacted her daughter for transportation home. IMM completed, patient agreeable to discharge.     Stacie Bermeo, JOHANNCC, BSN    [facility]    [address]  [address] [place] [address]    [email or web address]    Office: [phone] Pager: [phone]    To contact the weekend RNCC  [place] (0800 - 1630) Saturday and Sunday    Units: 4A, 4C, 4E, 5A and 5B- Pager 1: [phone]    Units: 6A, 6B, 6C, 6D- Pager 2:  [phone]    Units: 7A, 7B, 7C, 7D, and 5C-Pager 3: [phone]

## 2022-05-24 NOTE — PLAN OF CARE
Assumed care: 1500 - 2330    BP (!) 146/68 (BP Location: Left arm)   Pulse (!) 121   Temp 98.4  F (36.9  C) (Oral)   Resp 18   Wt 95.7 kg (210 lb 15.7 oz)   SpO2 96%   BMI 34.05 kg/m      Shift summary:  Jenn Aparicio is alert and oriented.  Vital signs stable, on room air for most of shift, and afebrile.  Placed on O2 1L at HS for comfort.  Patient continues to report chest pain from coughing, medicated with ibuprofen x 1 with somewhat good effect..  No nausea, vomiting, diarrhea.  Pt still endorses difficulty swallowing at times.  BG still elevated, covered per ISS.  OOBTC for most of shift.      Recent Labs   Lab 05/23/22  2136 05/23/22  1641   * 405*      Hemoglobin   Date Value Ref Range Status   05/23/2022 13.0 11.7 - 15.7 g/dL Final   06/25/2021 12.9 11.7 - 15.7 g/dL Final     Platelet Count   Date Value Ref Range Status   05/23/2022 235 150 - 450 10e3/uL Final   06/25/2021 169 150 - 450 10e9/L Final     WBC   Date Value Ref Range Status   06/25/2021 7.3 4.0 - 11.0 10e9/L Final     WBC Count   Date Value Ref Range Status   05/23/2022 11.6 (H) 4.0 - 11.0 10e3/uL Final     Potassium   Date Value Ref Range Status   05/23/2022 3.6 3.4 - 5.3 mmol/L Final   06/25/2021 3.7 3.4 - 5.3 mmol/L Final      Magnesium   Date Value Ref Range Status   05/22/2022 2.1 1.6 - 2.3 mg/dL Final   02/24/2021 2.0 1.6 - 2.3 mg/dL Final      Phosphorus   Date Value Ref Range Status   05/20/2022 2.9 2.5 - 4.5 mg/dL Final   02/25/2021 2.5 2.5 - 4.5 mg/dL Final     Goal Outcome Evaluation:    Problem: Plan of Care - These are the overarching goals to be used throughout the patient stay.    Goal: Plan of Care Review/Shift Note  Description: The Plan of Care Review/Shift note should be completed every shift.  The Outcome Evaluation is a brief statement about your assessment that the patient is improving, declining, or no change.  This information will be displayed automatically on your shift note.  Outcome: Ongoing,  "Progressing  Goal: Patient-Specific Goal (Individualized)  Description: You can add care plan individualizations to a care plan. Examples of Individualization might be:  \"Parent requests to be called daily at 9am for status\", \"I have a hard time hearing out of my right ear\", or \"Do not touch me to wake me up as it startles me\".  Outcome: Ongoing, Progressing  Goal: Absence of Hospital-Acquired Illness or Injury  Outcome: Ongoing, Progressing  Intervention: Identify and Manage Fall Risk  Recent Flowsheet Documentation  Taken 5/23/2022 2000 by Wander Torres, RN  Safety Promotion/Fall Prevention:   assistive device/personal items within reach   clutter free environment maintained   fall prevention program maintained   nonskid shoes/slippers when out of bed  Intervention: Prevent Skin Injury  Recent Flowsheet Documentation  Taken 5/23/2022 2000 by Wander Torres, RN  Body Position: position changed independently  Intervention: Prevent and Manage VTE (Venous Thromboembolism) Risk  Recent Flowsheet Documentation  Taken 5/23/2022 2000 by Wander Torres, RN  Activity Management:   activity adjusted per tolerance   ambulated in lyman  Goal: Optimal Comfort and Wellbeing  Outcome: Ongoing, Progressing  Intervention: Monitor Pain and Promote Comfort  Recent Flowsheet Documentation  Taken 5/23/2022 2018 by Wander Torres, RN  Pain Management Interventions: medication (see MAR)  Taken 5/23/2022 2000 by Wander Torres, RN  Pain Management Interventions: medication (see MAR)  Goal: Readiness for Transition of Care  Outcome: Ongoing, Progressing     Problem: Breathing Pattern Ineffective  Goal: Effective Breathing Pattern  Outcome: Ongoing, Progressing       "

## 2022-05-24 NOTE — PLAN OF CARE
.BP (!) 156/85 (BP Location: Right arm)   Pulse 105   Temp 98.1  F (36.7  C) (Oral)   Resp 16   Wt 95.7 kg (210 lb 15.7 oz)   SpO2 99%   BMI 34.05 kg/m      Pt alert and oriented. Afebrile. HTN. Ovss on 1.5lpm NC sating well. Denies nausea. Still with chest pain, unchanged from earlier. She is using lidocaine patch to her chest. No other complaints. Cont with poc.    Problem: Plan of Care - These are the overarching goals to be used throughout the patient stay.    Goal: Plan of Care Review/Shift Note  Description: The Plan of Care Review/Shift note should be completed every shift.  The Outcome Evaluation is a brief statement about your assessment that the patient is improving, declining, or no change.  This information will be displayed automatically on your shift note.  Outcome: Ongoing, Progressing     Problem: Breathing Pattern Ineffective  Goal: Effective Breathing Pattern  Outcome: Ongoing, Progressing

## 2022-05-24 NOTE — PROGRESS NOTES
Pt understood discharge orders/instructions : Yes.  Pt's belongings : Pt took including her portable oxygen.  Discharge medications : Picked up and given to the pt.  Lines : PIV was removed.  How is pt getting home/transportion : [employer] set up by Stacie Bermeo ( Care Coordinator). She will be staying with her daughter([address] in [facility]).  Discharge papers : Given to the pt.  Follow up appt : N/A.  Home supply : N/A.

## 2022-05-24 NOTE — PLAN OF CARE
Physical Therapy Discharge Summary  Reason for discharge:   Discharge from hospital to home  Progress towards goals: See goals on Care Plan in [facility] electronic health record for goal details.  Goals partially met. CGA to close SBA for transfers and gait with FWW Barriers to achieving goals: limited tolerance, fatigue, RUSSELL  Recommendation(s):   Continued therapy is recommended. Rationale/Recommendations: Home with assist and home PT to increase safety and independence with basic functional mobility, and to address unmet goals.

## 2022-05-24 NOTE — PROGRESS NOTES
Care Management Follow Up     Length of Stay (days): 4     Expected Discharge Date: TBD     Concerns to be Addressed: Discharge planning, medical readiness.       Patient plan of care discussed at interdisciplinary rounds: Yes     Anticipated Discharge Disposition:  Home  Anticipated Discharge Services:  OP PT  Anticipated Discharge DME:  Home O2?     Education Provided on the Discharge Plan: Yes  Patient/Family in Agreement with the Plan: Yes     Referrals Placed by CM/SW: OP PT  Private pay costs discussed: Not applicable    Additional Information:  SW rec'd call from pt's unit stating she did not have a discharge ride and would need a cab ride setup.  SW called [employer] [phone].  Pt's ride is setup for around 2pm today transporting to pt's home address via [employer].  Bedside nurse notified.    SW/RNCC will follow.    MARGARITA Wilkes, LSW  6B Intermediate Care Unit   [facility]  Office: [phone]  Pager: [phone]  Fax: [phone]    For weekend social work needs, contact information below and available on [employer]:  4A, 4C, 4E, 5A, 5B  pager [phone]  6A, 6B, 6C, 6D                       pager [phone]  7A, 7B, 7C, 7D, 5C  pager [phone]     For weekend RN care coordinator needs (home discharge with needs including home care, assisted living facility returns, durable medical equip, IV antibiotics) - page [phone].

## 2022-05-25 ENCOUNTER — PATIENT OUTREACH (OUTPATIENT)
Dept: ONCOLOGY | Facility: CLINIC | Age: 67
End: 2022-05-25
Payer: COMMERCIAL

## 2022-05-25 ENCOUNTER — TELEPHONE (OUTPATIENT)
Dept: GASTROENTEROLOGY | Facility: CLINIC | Age: 67
End: 2022-05-25
Payer: COMMERCIAL

## 2022-05-25 NOTE — TELEPHONE ENCOUNTER
[facility] Call Center    Phone Message    May a detailed message be left on voicemail: yes     Reason for Call: Appointment Intake    Referring Provider Name: Aster Elias MD  Diagnosis and/or Symptoms: Esophageal dysphagia [R13.19]    Patient is being referred for Esophageal dysphagia and first available is 30+ days out. Patient stated that she is not experiencing significant weight loss, her weight just fluctuates. Please review per scheduling guidelines. Thanks!     Action Taken: Message routed to:  [facility] GI    Travel Screening: Not Applicable

## 2022-05-27 LAB
BACTERIA BLD CULT: NO GROWTH
BACTERIA BLD CULT: NO GROWTH

## 2022-06-01 ENCOUNTER — VIRTUAL VISIT (OUTPATIENT)
Dept: PHARMACY | Facility: CLINIC | Age: 67
End: 2022-06-01
Payer: COMMERCIAL

## 2022-06-01 DIAGNOSIS — Z78.9 TAKES DIETARY SUPPLEMENTS: ICD-10-CM

## 2022-06-01 DIAGNOSIS — E78.5 HYPERLIPIDEMIA LDL GOAL <100: ICD-10-CM

## 2022-06-01 DIAGNOSIS — E11.9 TYPE 2 DIABETES MELLITUS WITHOUT COMPLICATION, WITHOUT LONG-TERM CURRENT USE OF INSULIN (H): ICD-10-CM

## 2022-06-01 DIAGNOSIS — J44.9 CHRONIC OBSTRUCTIVE PULMONARY DISEASE, UNSPECIFIED COPD TYPE (H): Primary | ICD-10-CM

## 2022-06-01 DIAGNOSIS — J30.2 SEASONAL ALLERGIC RHINITIS, UNSPECIFIED TRIGGER: ICD-10-CM

## 2022-06-01 DIAGNOSIS — K21.9 GASTROESOPHAGEAL REFLUX DISEASE WITHOUT ESOPHAGITIS: ICD-10-CM

## 2022-06-01 DIAGNOSIS — I10 ESSENTIAL HYPERTENSION: ICD-10-CM

## 2022-06-01 DIAGNOSIS — R13.19 ESOPHAGEAL DYSPHAGIA: ICD-10-CM

## 2022-06-01 PROCEDURE — 99606 MTMS BY PHARM EST 15 MIN: CPT | Performed by: PHARMACIST

## 2022-06-01 RX ORDER — CETIRIZINE HYDROCHLORIDE 10 MG/1
10 TABLET ORAL DAILY
Qty: 30 TABLET | Refills: 10 | Status: SHIPPED | OUTPATIENT
Start: 2022-06-01 | End: 2023-05-04

## 2022-06-01 RX ORDER — GLIPIZIDE 5 MG/1
5 TABLET ORAL
Qty: 180 TABLET | Refills: 3 | Status: SHIPPED | OUTPATIENT
Start: 2022-06-01 | End: 2023-03-02

## 2022-06-01 RX ORDER — AMLODIPINE BESYLATE 10 MG/1
10 TABLET ORAL DAILY
Qty: 90 TABLET | Refills: 3 | Status: SHIPPED | OUTPATIENT
Start: 2022-06-01 | End: 2023-07-21

## 2022-06-01 RX ORDER — ASPIRIN 81 MG/1
81 TABLET ORAL DAILY
Qty: 90 TABLET | Refills: 3 | Status: SHIPPED | OUTPATIENT
Start: 2022-06-01 | End: 2023-05-24

## 2022-06-01 RX ORDER — RIBOFLAVIN (VITAMIN B2) 100 MG
1 TABLET ORAL DAILY
Qty: 90 TABLET | Refills: 3 | Status: SHIPPED | OUTPATIENT
Start: 2022-06-01 | End: 2023-04-20

## 2022-06-01 RX ORDER — FLASH GLUCOSE SENSOR
KIT MISCELLANEOUS
Qty: 2 EACH | Refills: 11 | Status: SHIPPED | OUTPATIENT
Start: 2022-06-01 | End: 2023-05-05

## 2022-06-01 RX ORDER — ALBUTEROL SULFATE 90 UG/1
AEROSOL, METERED RESPIRATORY (INHALATION)
Qty: 18 G | Refills: 3 | Status: SHIPPED | OUTPATIENT
Start: 2022-06-01 | End: 2023-03-08

## 2022-06-01 NOTE — PATIENT INSTRUCTIONS
"Recommendations from today's MTM visit:                                                    MTM (medication therapy management) is a service provided by a clinical pharmacist designed to help you get the most of out of your medicines.      1. Refilled albuterol, amlodipine, aspirin, cetirizine, Freestyle Marti sensors, glipizide, glucosamine-chondroitin, omega-3 acid, and Trelegy Ellipta inhaler. I sent these prescriptions to the [facility]. They can be contacted at [phone].     2. You stated that you did not get the pantoprazole (acid reducing medication) prescription at discharge. You should have your local [facility] pharmacy contact the [facility] at the [facility] at [phone].     Follow-up: As needed    It was great speaking with you today.  I value your experience and would be very thankful for your time in providing feedback in our clinic survey. In the next few days, you may receive an email or text message from [employer] with a link to a survey related to your  clinical pharmacist.\"     To schedule another MTM appointment, please call the clinic directly or you may call the MTM scheduling line at [phone] or toll-free at [phone].     My Clinical Pharmacist's contact information:                                                      Please feel free to contact me with any questions or concerns you have.      Daniel Mcgrath, PharmD, [facility]  Medication Therapy Management Pharmacist  Pager: [phone]      "

## 2022-06-01 NOTE — PROGRESS NOTES
Medication Therapy Management (MTM) Encounter    ASSESSMENT:                            Medication Adherence/Access: has attempted multiple mail order pharmacies but per her insurance [facility] is preferred.     COPD/Allergies: Somewhat improved. Would benefit from restarting cetirizine.    Dysphagia/GERD: Unchanged. Would benefit from transferring prescription to local pharmacy.     Type 2 Diabetes:  A1c is not at goal of <7%. Would benefit from getting CGM sensors to review her current lifestyle choices and their impact on blood sugars.     Hypertension: Last blood pressure was above goal. Would benefit from recheck at upcoming appointment.     Hyperlipidemia: Patient is not on moderate intensity statin which is indicated based on 2019 ACC/AHA guidelines for lipid management.  Would benefit from further discussion at future visit.     Supplements: Stable.     PLAN:                            1. Refilled albuterol, amlodipine, aspirin, cetirizine, Freestyle Marti sensors, glipizide, glucosamine-chondroitin, omega-3 acid, and Trelegy Ellipta inhaler. I sent these prescriptions to the [facility]. They can be contacted at [phone].     2. You stated that you did not get the pantoprazole (acid reducing medication) prescription at discharge. You should have your local [facility] pharmacy contact the [facility] the [facility] at [phone].     3. Future considerations - statin?    Follow-up: As needed    SUBJECTIVE/OBJECTIVE:                          Jenn Aparicio is a 66 year old female called for a transitions of care visit. She was discharged from [facility] on 5/24/2022 for acute on chromic hypoxic respiratory failure.      Reason for visit: Hospital Follow-up.    Allergies/ADRs: Reviewed in chart  Past Medical History: Reviewed in chart  Tobacco: She reports that she has been smoking cigarettes. She has been smoking about 0.25  packs per day. She has quit using smokeless tobacco.Tobacco Cessation Action Plan:   not smoking right now  Alcohol: none    Medication Adherence/Access: no issues reported - may need to go back to pill box. Has had difficulty getting her medications sent to a pharmacy that can deliver to her home. She cannot get from [facility].     COPD/Allergies: Current medications: Trelegy Ellipta once daily, Duonebs 3-4 times daily, and albuterol HFA . Patient rinses their mouth after using steroid inhaler.    Patient is not experiencing side effects.   Patient reports the following symptoms: oxygen use, shortness of breath. Building is putting in a new rug and the fumes are impacting her breathing. Still not smoking as she cannot go outside.  She has been on cetirizine before, but has been without due to issues with finding mail order pharmacy - she has felt that this has helped in the past and wants to restart. Does take fluticasone nasal spray daily, ketotifen daily and dry eye drops as well.   Patient does not have an COPD Action Plan on file.   Has spirometry been completed: Yes     Dysphagia/GERD: Current medications include: Protonix (pantoprazole) 40 mg once daily. Patient reports ongoing symptoms - she states she did not receive. Supposed to follow-up with gastroenterology.     Type 2 Diabetes:  Currently taking glipizide XL 5 mg twice daily. Patient is not experiencing side effects.  Blood sugar monitoring: Continuous Glucose Monitor. Has not had for some time. She does have reader device, but not sensors.   Symptoms of low blood sugar? none  Symptoms of high blood sugar? None. Does take alpha lipoic acid daily for neuropathy.   Eye exam: up to date  Foot exam: due  Aspirin: Taking 81mg daily and denies side effects   Statin: No - did not discuss in depth as we were focused on getting patient back on her medications.   ACEi/ARB: No.   Urine Albumin: No results found for: UMALCR   Lab Results    Component Value Date    A1C 8.4 05/20/2022    A1C 8.4 01/27/2022    A1C 8.3 01/11/2022    A1C 7.0 02/24/2021    A1C 6.8 09/08/2020    A1C 7.1 12/01/2015     Hypertension: Current medications include amlodipine 10 mg daily.  Patient does not self-monitor blood pressure.  Patient reports no current medication side effects.  BP Readings from Last 3 Encounters:   05/24/22 (!) 147/80   05/04/22 131/67   03/31/22 117/72     Hyperlipidemia: Current therapy includes Lovaza 2 grams twice daily (patient has been without unknown amount of time). Recent prescription was sent to [facility], but patient states they cannot fill.  Patient reports no significant myalgias or other side effects.  The 10-year ASCVD risk score (Nicole KAPOOR Jr., et al., 2013) is: 35.7%    Values used to calculate the score:      Age: 66 years      Sex: Female      Is Non- : No      Diabetic: Yes      Tobacco smoker: Yes      Systolic Blood Pressure: 147 mmHg      Is BP treated: Yes      HDL Cholesterol: 58 mg/dL      Total Cholesterol: 251 mg/dL  Recent Labs   Lab Test 01/27/22  1439 01/11/22  0845   CHOL 251* 224*   HDL 58 55   * 149*   TRIG 127 102       Supplements: Patient is taking alpha-lipoic acid daily, CoQ10 daily, glucosamine-chondroitin daily, and a daily multivitamin. Denies any current issues.       Today's Vitals: There were no vitals taken for this visit.   Wt Readings from Last 5 Encounters:   05/20/22 210 lb 15.7 oz (95.7 kg)   05/04/22 211 lb (95.7 kg)   03/31/22 211 lb (95.7 kg)   01/27/22 219 lb 11.2 oz (99.7 kg)   01/11/22 221 lb 8 oz (100.5 kg)     ----------------  Post Discharge Medication Reconciliation Status: discharge medications reconciled and changed, per note/orders.    I spent 30 minutes with this patient today. All changes were made via collaborative practice agreement with BENOIT Ruiz CNP. A copy of the visit note was provided to the patient's provider(s).    The patient  was mailed a summary of these recommendations.     Daniel Mcgrath, GamalielD, [facility]  Medication Therapy Management Pharmacist  Pager: [phone]    Telemedicine Visit Details  Type of service:  Telephone visit  Start Time: 11:00 AM  End Time: 11:30 AM  Originating Location (patient location): [place]  Distant Location (provider location):  [facility]     Medication Therapy Recommendations  No medication therapy recommendations to display

## 2022-06-01 NOTE — Clinical Note
FYI - I think only [facility] is preferred for patient delivery options so got patient's medications set up with this pharmacy.  Hopefully will help her stay on them consistently.  Daniel Mcgrath, PharmD, [facility] Medication Therapy Management Pharmacist Pager: [phone]

## 2022-06-01 NOTE — LETTER
[address] [address] [phone]      June 1, 2022    Jenn Aparicio                                                                                                                     [address]   [facility] [address]      Dear Jenn,    It was nice to speak with you on 6/1/2022.  I hope I was able to give you some useful information during our Medication Therapy Management (MTM) visit. The purpose of this visit with a clinical pharmacist was to review the medicines you received when discharged from the hospital. We want to make sure that you know which medicines to take and what they are for. We also want to make sure all your medicines are working, safe, and as easy to take as possible.    Based upon our conversation today:     1. Refilled albuterol, amlodipine, aspirin, cetirizine, Freestyle Marti sensors, glipizide, glucosamine-chondroitin, omega-3 acid, and Trelegy Ellipta inhaler. I sent these prescriptions to the [facility]. They can be contacted at [phone].     2. You stated that you did not get the pantoprazole (acid reducing medication) prescription at discharge. You should have your local [facility] pharmacy contact the [facility] at the [facility] at [phone].     Follow-up: As needed    Feel free to call if you have any questions or concerns. By working together with you and your doctor, I hope to help you feel confident managing your medicines and improving your quality of life.       Best wishes,         Daniel Mcgrath, PharmD, [facility]  Medication Therapy Management Pharmacist  Pager: [phone]

## 2022-06-07 ENCOUNTER — TELEPHONE (OUTPATIENT)
Dept: PULMONOLOGY | Facility: CLINIC | Age: 67
End: 2022-06-07
Payer: COMMERCIAL

## 2022-06-07 NOTE — TELEPHONE ENCOUNTER
Spoke with pt follow up apptmnt scheduled details confirmed with pt. Mailed itinerary per pt request.

## 2022-06-08 DIAGNOSIS — E46 MALNUTRITION (H): ICD-10-CM

## 2022-06-08 DIAGNOSIS — E11.9 TYPE 2 DIABETES MELLITUS WITHOUT COMPLICATION, WITHOUT LONG-TERM CURRENT USE OF INSULIN (H): Primary | ICD-10-CM

## 2022-06-08 NOTE — TELEPHONE ENCOUNTER
Patient chart will be reviewed by GI clinic management. Appointment urgency will be determined based on chart review.  Patient will be contacted if an appointment change is necessary after chart review.

## 2022-06-13 RX ORDER — OMEGA-3-ACID ETHYL ESTERS 1 G/1
CAPSULE, LIQUID FILLED ORAL
Qty: 360 CAPSULE | Refills: 2 | Status: SHIPPED | OUTPATIENT
Start: 2022-06-13 | End: 2023-01-23

## 2022-06-13 NOTE — TELEPHONE ENCOUNTER
Last Clinic Visit: 1/27/2022 [facility] Internal Medicine [place]

## 2022-06-24 ENCOUNTER — ANCILLARY PROCEDURE (OUTPATIENT)
Dept: CT IMAGING | Facility: CLINIC | Age: 67
End: 2022-06-24
Attending: INTERNAL MEDICINE
Payer: COMMERCIAL

## 2022-06-24 DIAGNOSIS — C34.91 ADENOCARCINOMA OF RIGHT LUNG (H): ICD-10-CM

## 2022-06-24 PROCEDURE — 71250 CT THORAX DX C-: CPT | Mod: GC | Performed by: RADIOLOGY

## 2022-06-29 ENCOUNTER — ONCOLOGY VISIT (OUTPATIENT)
Dept: ONCOLOGY | Facility: CLINIC | Age: 67
End: 2022-06-29
Attending: INTERNAL MEDICINE
Payer: COMMERCIAL

## 2022-06-29 VITALS
HEART RATE: 104 BPM | SYSTOLIC BLOOD PRESSURE: 133 MMHG | BODY MASS INDEX: 33.8 KG/M2 | DIASTOLIC BLOOD PRESSURE: 75 MMHG | WEIGHT: 209.4 LBS | OXYGEN SATURATION: 95 % | RESPIRATION RATE: 18 BRPM | TEMPERATURE: 98.7 F

## 2022-06-29 DIAGNOSIS — C34.90 PRIMARY ADENOCARCINOMA OF LUNG, UNSPECIFIED LATERALITY (H): ICD-10-CM

## 2022-06-29 DIAGNOSIS — Z20.822 EXPOSURE TO 2019 NOVEL CORONAVIRUS: Primary | ICD-10-CM

## 2022-06-29 LAB — SARS-COV-2 RNA RESP QL NAA+PROBE: NEGATIVE

## 2022-06-29 PROCEDURE — G0463 HOSPITAL OUTPT CLINIC VISIT: HCPCS

## 2022-06-29 PROCEDURE — 99215 OFFICE O/P EST HI 40 MIN: CPT | Performed by: INTERNAL MEDICINE

## 2022-06-29 PROCEDURE — U0003 INFECTIOUS AGENT DETECTION BY NUCLEIC ACID (DNA OR RNA); SEVERE ACUTE RESPIRATORY SYNDROME CORONAVIRUS 2 (SARS-COV-2) (CORONAVIRUS DISEASE [COVID-19]), AMPLIFIED PROBE TECHNIQUE, MAKING USE OF HIGH THROUGHPUT TECHNOLOGIES AS DESCRIBED BY CMS-2020-01-R: HCPCS | Performed by: INTERNAL MEDICINE

## 2022-06-29 ASSESSMENT — PAIN SCALES - GENERAL: PAINLEVEL: NO PAIN (0)

## 2022-06-29 NOTE — NURSING NOTE
"Oncology Rooming Note    June 29, 2022 2:26 PM   Jenn Aparicio is a 67 year old female who presents for:    Chief Complaint   Patient presents with     Oncology Clinic Visit     Adenocarcinoma of right lung (H)      Initial Vitals: /75   Pulse 104   Temp 98.7  F (37.1  C) (Oral)   Resp 18   Wt 95 kg (209 lb 6.4 oz)   SpO2 95%   BMI 33.80 kg/m   Estimated body mass index is 33.8 kg/m  as calculated from the following:    Height as of 5/4/22: 1.676 m (5' 6\").    Weight as of this encounter: 95 kg (209 lb 6.4 oz). Body surface area is 2.1 meters squared.  No Pain (0) Comment: Data Unavailable   No LMP recorded. Patient is postmenopausal.  Allergies reviewed: Yes  Medications reviewed: Yes    Medications: Medication refills not needed today.  Pharmacy name entered into EPIC:    [id number] - [place], [place] - [address] AT [address] [place] [address] & [facility] (NOW [facility]) - [address]  [id number] - [facility] [address] [place] [address] AT [place] [id number] [facility]ACY - [place], [place] - [facility] - [place], [place] - [address] [place] [address]  [facility] - [place], [place] - [address]    Clinical concerns: Pt was exposed to COVID 4 days ago. Daughter tested positive today 06/29/22. Currently having no symptoms. Unsure if she needs to test.       Lorraine Radford, [facility]            "
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
## 2022-06-30 ENCOUNTER — TELEPHONE (OUTPATIENT)
Dept: GASTROENTEROLOGY | Facility: CLINIC | Age: 67
End: 2022-06-30

## 2022-06-30 DIAGNOSIS — R93.89 ABNORMAL FINDING OF DIAGNOSTIC IMAGING: ICD-10-CM

## 2022-06-30 DIAGNOSIS — R13.19 ESOPHAGEAL DYSPHAGIA: Primary | ICD-10-CM

## 2022-06-30 NOTE — TELEPHONE ENCOUNTER
Referral for dysphagia reviewed with Dr. Briseno. He advises 8/4 TAMRA slot if patient is able to do so. Also orders her an EGD to be done by him VORLINDA.     Will send to scheduling to please contact her for the above.

## 2022-07-01 ENCOUNTER — TELEPHONE (OUTPATIENT)
Dept: GASTROENTEROLOGY | Facility: CLINIC | Age: 67
End: 2022-07-01

## 2022-07-01 NOTE — TELEPHONE ENCOUNTER
Screening Questions    BlueKIND OF PREP RedLOCATION [review exclusion criteria] GreenSEDATION TYPE      1. Are you active on mychart? N    2. What insurance is in the chart? [employer]     3.   Ordering/Referring Provider: DR. ISRAEL PICHARDO    4. BMI   (If greater than 40 review exclusion criteria [PAC APPT IF [MAC] @ [facility])  33.80  [If yes, BMI OVER 40-EXTENDED PREP]      **(Sedation review/consideration needed)**  Do you have a legal guardian or Medical Power of    and/or are you able to give consent for your medical care?     N    5. Have you had a positive covid test in the last 90 days?   N - N    6.  Are you currently on dialysis?   N [ If yes, G-PREP & HOSPITAL setting ONLY]     7.  Do you have chronic kidney disease?  N [ If yes, G-PREP ]    8.   Do you have a diagnosis of diabetes?   Y   [ If yes, G-PREP ]    9.  On a regular basis do you go 3-5 days between bowel movements?   N   [ If yes, EXTENDED PREP]    10.  Are you taking any prescription pain medications on a routine schedule?    N - N [ If yes, EXTENDED PREP] [If yes, MAC]      11.   Do you have any chemical dependencies such as alcohol, street drugs, or methadone?    N [If yes, MAC]    12.   Do you have any history of post-traumatic stress syndrome, severe anxiety or history of psychosis?    N  [If yes, MAC]    13.  [FEMALES] Are you currently pregnant? N    If yes, how many weeks?       Respiratory/Heart Screening:  [If yes to any of the following HOSPITAL setting only]     14. Do you have Pulmonary Hypertension [Lungs]?   N       15. Do you have UNCONTROLLED asthma?   N     16.  Do you use daily home oxygen?  Y    17. Do you have mod to severe Obstructive Sleep Apnea?         (OKAY @ [facility]  [facility]  SH  PH  RI  MG - if pt is not on OXYGEN)  N      18.   Have you had a heart or lung transplant?   N      19.   Have you had a stroke or Transient ischemic attack (TIA - aka  mini stroke ) within 6 months?  (If yes, please review exclusion criteria)  N      20.   In the past 6 months, have you had any heart related issues including cardiomyopathy or heart attack?   N           If yes, did it require cardiac stenting or other implantable device?   N      21.   Do you have any implantable devices in your body (pacemaker, defib, LVAD)? (If yes, please review exclusion criteria)  N     22. Do you take nitroglycerin?   N           If yes, how often? N  (if yes, HOSPITAL setting ONLY)    23.  Are you currently taking any blood thinners?    [If yes, INFORM patient to follw up w/ ORDERING PROVIDER FOR BRIDGING INSTRUCTIONS]     N    24.   Do you transfer independently?                (If NO, please HOSPITAL setting ONLY)  Y    25.   Preferred LOCAL Pharmacy for Pre Prescription:      [id number] - [place], [place] - [address] AT [facility]      Scheduling Details  (Please ask for phone number if not scheduled by patient)      Caller : Jenn Aparicio  Date of Procedure: 08/16/2022  Surgeon: DR. PICHARDO  Location:         Sedation Type: CS l   Conscious Sedation- Needs  for 6 hours after the procedure  MAC/General-Needs  for 24 hours after procedure    N :[Pre-op Required] at [facility]  MG and OR for MAC sedation   (advise patient they will need a pre-op WITH IN 30 DAYS of procedure date)     Type of Procedure Scheduled:   Upper Endoscopy [EGD]    Which Colonoscopy Prep was Sent?:    -     [facility] PATIENTS & [facility] PATIENTS NEEDS EXTENDED PREP       Informed patient they will need an adult  Y  Cannot take any type of public or medical transportation alone    Pre-Procedure Covid test to be completed at [facility] or Externally: Y  **INFORMED OF HOME TESTING & LAB OPTION**        Confirmed Nurse will call to complete assessment Y    Additional comments: N

## 2022-07-21 ENCOUNTER — TRANSFERRED RECORDS (OUTPATIENT)
Dept: HEALTH INFORMATION MANAGEMENT | Facility: CLINIC | Age: 67
End: 2022-07-21

## 2022-08-02 ENCOUNTER — TELEPHONE (OUTPATIENT)
Dept: INTERNAL MEDICINE | Facility: CLINIC | Age: 67
End: 2022-08-02

## 2022-08-02 NOTE — TELEPHONE ENCOUNTER
[facility] Call Center    Phone Message    May a detailed message be left on voicemail: yes     Reason for Call: Other: Nirmala from [facility] called and stated they need a signed appeals letter from an MD for the pt's free style marlon

## 2022-08-04 ENCOUNTER — OFFICE VISIT (OUTPATIENT)
Dept: GASTROENTEROLOGY | Facility: CLINIC | Age: 67
End: 2022-08-04
Payer: COMMERCIAL

## 2022-08-04 VITALS
BODY MASS INDEX: 33.44 KG/M2 | HEIGHT: 66 IN | SYSTOLIC BLOOD PRESSURE: 127 MMHG | WEIGHT: 208.1 LBS | OXYGEN SATURATION: 93 % | HEART RATE: 96 BPM | DIASTOLIC BLOOD PRESSURE: 77 MMHG

## 2022-08-04 DIAGNOSIS — R13.19 OTHER DYSPHAGIA: ICD-10-CM

## 2022-08-04 DIAGNOSIS — K21.9 GASTROESOPHAGEAL REFLUX DISEASE, UNSPECIFIED WHETHER ESOPHAGITIS PRESENT: Primary | ICD-10-CM

## 2022-08-04 PROCEDURE — 99204 OFFICE O/P NEW MOD 45 MIN: CPT | Performed by: INTERNAL MEDICINE

## 2022-08-04 ASSESSMENT — PAIN SCALES - GENERAL: PAINLEVEL: NO PAIN (0)

## 2022-08-04 NOTE — PROGRESS NOTES
GASTROENTEROLOGY CONSULTATION       Site of Visit:   [address]    Provider:   Travis Briseno MD    PCP:   Mitzi Nettles    Chief Complaint:    Dysphagia    Assessment:  This is a 67-year-old female with a history of breast cancer has had difficulty with swallowing for the last 2 years both solids and liquids.  Esophageal motility appears impaired on esophagram.  She also has a hiatal hernia and free gastroesophageal reflux into the esophagus from the stomach.    Recommendations:   EGD already scheduled  Omeprazole 20 mg p.o. twice daily      History of Present Illness:   Jenn is a 67-year-old with a history of adenocarcinoma of the lung with recurrence currently being seen by oncology.  Over the 2 years or so she developed problems with swallowing both solids and liquids and pills.  She is learned to live with it she says.  She underwent an esophagram recently that showed a hiatal hernia with gastroesophageal reflux and some moderate esophageal dysfunction.  She otherwise doing okay.  She not losing weight.  She said no problems with her bowels.  These problems with swallowing happen relatively frequently.  On a daily basis.       Current Outpatient Medications   Medication Sig Dispense Refill     albuterol (PROAIR HFA/PROVENTIL HFA/VENTOLIN HFA) 108 (90 Base) MCG/ACT inhaler INHALE 1 OR 2 PUFFS BY MOUTH EVERY 4 HOURS AS NEEDED 18 g 3     alcohol swab prep pads Use to swab area of injection/xander as directed. 100 each 1     Alpha-Lipoic Acid 300 MG CAPS Take 300 mg by mouth daily 90 capsule 3     amLODIPine (NORVASC) 10 MG tablet Take 1 tablet (10 mg) by mouth daily 90 tablet 3     aspirin 81 MG EC tablet Take 1 tablet (81 mg) by mouth daily 90 tablet 3     benzonatate (TESSALON) 200 MG capsule Take 1 capsule (200 mg) by mouth 3 times daily as needed for cough 100 capsule 3     cetirizine (ZYRTEC) 10 MG tablet Take 1 tablet (10 mg) by mouth daily 30 tablet 10     coenzyme Q-10 (CO-Q10) 50 MG capsule  Take 1 capsule (50 mg) by mouth daily 90 capsule 3     cyclobenzaprine (FLEXERIL) 5 MG tablet Take 1 tablet (5 mg) by mouth 3 times daily as needed for muscle spasms 30 tablet 0     Easy Comfort Lancets MISC        fluticasone (FLONASE) 50 MCG/ACT nasal spray Spray 1 spray into both nostrils daily Use at night before bed 15.8 mL 1     Fluticasone-Umeclidin-Vilanterol (TRELEGY ELLIPTA) 200-62.5-25 MCG/INH oral inhaler Inhale 1 puff into the lungs daily 28 each 5     glipiZIDE (GLUCOTROL) 5 MG tablet Take 1 tablet (5 mg) by mouth 2 times daily (before meals) 180 tablet 3     glucosamine-chondroitin 500-400 MG tablet Take 1 tablet by mouth daily 90 tablet 3     ipratropium - albuterol 0.5 mg/2.5 mg/3 mL (DUONEB) 0.5-2.5 (3) MG/3ML neb solution Take 1 vial (3 mLs) by nebulization every 6 hours as needed for shortness of breath / dyspnea or wheezing 1080 mL 0     ketotifen (ZADITOR) 0.025 % ophthalmic solution Place 1 drop into both eyes daily       Lancet Devices (EASY MINI EJECT LANCING DEVICE) MISC        multivitamin w/minerals (THERA-VIT-M) tablet Take 1 tablet by mouth daily 30 tablet 11     omega-3 acid ethyl esters (LOVAZA) 1 g capsule Take 2 capsules (2 g) by mouth every morning AND 2 capsules (2 g) every evening. 360 capsule 2     Omega-3-Acid Eth Est, Dietary, 1 g CAPS Take 2 capfuls by mouth 2 times daily 360 capsule 1     omeprazole (PRILOSEC) 20 MG DR capsule Take 1 capsule (20 mg) by mouth 2 times daily 180 capsule 3     omeprazole (PRILOSEC) 20 MG DR capsule Take 1 capsule (20 mg) by mouth 2 times daily 180 capsule 3     polyethylene glycol-propylene glycol (SYSTANE) 0.4-0.3 % SOLN ophthalmic solution Place 1 drop into both eyes 4 times daily 5 mL 11     sodium chloride (NEBUSAL) 3 % neb solution Take 3 mLs by nebulization daily Use 1-2 times daily in combination with Duoneb and Aerobika to help with mucus clearance 250 mL 3     Continuous Blood Gluc Sensor (FREESTYLE GIOVANNI 14 DAY SENSOR) MISC Change  "every 14 days. (Patient not taking: Reported on 8/4/2022) 2 each 11       Past Surgical History:   Procedure Laterality Date     2/8/16                R thoracotomy, RLL lobectomy (Dr. Cunha). Adenocarcinoma, 1.1 cm, assoicated with atypical adenomatous hyperplasia Right 02/08/2016    R thoracotomy, RLL lobectomy (Dr. Cunha). Adenocarcinoma, 1.1 cm, assoicated with atypical adenomatous hyperplasia       Past Medical History:   Diagnosis Date     Adenocarcinoma, lung (H)      Asthma      Ectopic pregnancy      Pulmonary emphysema (H)     Very severe FEV1<30% predicted     Type II diabetes mellitus (H)        Family History   Problem Relation Age of Onset     Depression Daughter      Alcohol/Drug Other         self     Diabetes Other         self     Thyroid Disease Other         self     Asthma Other         self        reports that she has been smoking cigarettes. She has been smoking about 0.25 packs per day. She has quit using smokeless tobacco. She reports current alcohol use. She reports that she does not use drugs.         Physical Exam:   /77   Pulse 96   Ht 1.676 m (5' 6\")   Wt 94.4 kg (208 lb 1.6 oz)   SpO2 93%   BMI 33.59 kg/m    Wt:   Wt Readings from Last 2 Encounters:   08/04/22 94.4 kg (208 lb 1.6 oz)   06/29/22 95 kg (209 lb 6.4 oz)      Constitutional: cooperative, pleasant, not dyspneic/diaphoretic, no acute distress  Eyes: Sclera anicteric/injected  Ears/nose/mouth/throat: Normal oropharynx without ulcers or exudate, mucus membranes moist, hearing intact  Neck: supple, thyroid normal size  Cardiac: Regular rate and rhythm no murmurs rubs or gallops.  CV: No edema  Respiratory: Unlabored breathing, no wheezes rales or ronchi  Lymph: No axillary, submandibular, supraclavicular or inguinal lymphadenopathy  Abd: Nondistended, +bs, no hepatosplenomegaly, nontender, no peritoneal signs  Skin: warm, perfused, no jaundice  Neuro: AAO x 3, No asterixis  Psych: Normal affect  MSK: Normal " gait    Wt Readings from Last 2 Encounters:   08/04/22 94.4 kg (208 lb 1.6 oz)   06/29/22 95 kg (209 lb 6.4 oz)

## 2022-08-04 NOTE — PATIENT INSTRUCTIONS
It was a pleasure taking care of you today. I've included a brief summary of our discussion and care plan from today's visit below.  Please review this information with your primary care provider.  _______________________________________________________________________    My recommendations are summarized as follows:  Upper endoscopy which is already been scheduled  Omeprazole 20 mg p.o. twice daily    We will follow-up with the time of upper endoscopy to determine the follow-up as necessary    If you need any follow-up appointments, please use the following phone numbers below.    To schedule or reschedule a follow-up GI appointment, call [phone] option 1    To schedule your endoscopy procedure, call [phone] option 2    To schedule imaging, please call [phone]     To schedule your lab appointment at [address] floor lab call [phone]. Call your [place] [place] lab directly if it is not [facility]. If you use a non-[facility] [place] lab, please let us know where to fax your lab order (call Radha at [phone]).      _______________________________________________________________________    Please be in touch if there are any further questions that arise following today's visit.  There are multiple ways to contact your gastroenterology care team.      During business hours, you may reach your gastroenterology RN Care Coordinator, Radha Engel, at [phone].      You can always send a secure message through [employer]. [employer] messages are answered by your nurse or doctor typically within 24 hours. Please allow extra time on weekends and holidays.     What is [employer]?  Gigturn is a secure way for you to access all of your healthcare records from the [facility].  It is a web based computer program, so you can sign on to it from any location.  It also allows you to send secure messages to your care team.  I recommend signing up for Gig[employer] access if you have not already done so  and are comfortable with using a computer.     For urgent/emergent questions after business hours, you may reach the on-call GI Fellow by contacting the [facility]  at [phone].     How will I get the results of any tests ordered?    You will receive all of your results.  If you have signed up for [employer], any tests ordered at your visit will be available to you after your physician reviews them.  Typically this takes 1-2 weeks.  If there are urgent results that require a change in your care plan, your physician or nurse will call you to discuss the next steps.      Thank you for choosing [facility]!       Sincerely,    Travis Briseno MD  [facility]  Division of Gastroenterology

## 2022-08-04 NOTE — LETTER
8/4/2022         RE: Jenn Aparicio  [address]  [place] [address]        Dear Colleague,    Thank you for referring your patient, Jenn Aparicio, to the [facility] [place]. Please see a copy of my visit note below.      GASTROENTEROLOGY CONSULTATION       Site of Visit:   [address]    Provider:   Travis Briseno MD    PCP:   Mitzi Nettles    Chief Complaint:    Dysphagia    Assessment:  This is a 67-year-old female with a history of breast cancer has had difficulty with swallowing for the last 2 years both solids and liquids.  Esophageal motility appears impaired on esophagram.  She also has a hiatal hernia and free gastroesophageal reflux into the esophagus from the stomach.    Recommendations:   EGD already scheduled  Omeprazole 20 mg p.o. twice daily      History of Present Illness:   Jenn is a 67-year-old with a history of adenocarcinoma of the lung with recurrence currently being seen by oncology.  Over the 2 years or so she developed problems with swallowing both solids and liquids and pills.  She is learned to live with it she says.  She underwent an esophagram recently that showed a hiatal hernia with gastroesophageal reflux and some moderate esophageal dysfunction.  She otherwise doing okay.  She not losing weight.  She said no problems with her bowels.  These problems with swallowing happen relatively frequently.  On a daily basis.       Current Outpatient Medications   Medication Sig Dispense Refill     albuterol (PROAIR HFA/PROVENTIL HFA/VENTOLIN HFA) 108 (90 Base) MCG/ACT inhaler INHALE 1 OR 2 PUFFS BY MOUTH EVERY 4 HOURS AS NEEDED 18 g 3     alcohol swab prep pads Use to swab area of injection/xander as directed. 100 each 1     Alpha-Lipoic Acid 300 MG CAPS Take 300 mg by mouth daily 90 capsule 3     amLODIPine (NORVASC) 10 MG tablet Take 1 tablet (10 mg) by mouth daily 90 tablet 3     aspirin 81 MG EC tablet Take 1 tablet (81 mg) by mouth daily 90  tablet 3     benzonatate (TESSALON) 200 MG capsule Take 1 capsule (200 mg) by mouth 3 times daily as needed for cough 100 capsule 3     cetirizine (ZYRTEC) 10 MG tablet Take 1 tablet (10 mg) by mouth daily 30 tablet 10     coenzyme Q-10 (CO-Q10) 50 MG capsule Take 1 capsule (50 mg) by mouth daily 90 capsule 3     cyclobenzaprine (FLEXERIL) 5 MG tablet Take 1 tablet (5 mg) by mouth 3 times daily as needed for muscle spasms 30 tablet 0     Easy Comfort Lancets MISC        fluticasone (FLONASE) 50 MCG/ACT nasal spray Spray 1 spray into both nostrils daily Use at night before bed 15.8 mL 1     Fluticasone-Umeclidin-Vilanterol (TRELEGY ELLIPTA) 200-62.5-25 MCG/INH oral inhaler Inhale 1 puff into the lungs daily 28 each 5     glipiZIDE (GLUCOTROL) 5 MG tablet Take 1 tablet (5 mg) by mouth 2 times daily (before meals) 180 tablet 3     glucosamine-chondroitin 500-400 MG tablet Take 1 tablet by mouth daily 90 tablet 3     ipratropium - albuterol 0.5 mg/2.5 mg/3 mL (DUONEB) 0.5-2.5 (3) MG/3ML neb solution Take 1 vial (3 mLs) by nebulization every 6 hours as needed for shortness of breath / dyspnea or wheezing 1080 mL 0     ketotifen (ZADITOR) 0.025 % ophthalmic solution Place 1 drop into both eyes daily       Lancet Devices (EASY MINI EJECT LANCING DEVICE) MISC        multivitamin w/minerals (THERA-VIT-M) tablet Take 1 tablet by mouth daily 30 tablet 11     omega-3 acid ethyl esters (LOVAZA) 1 g capsule Take 2 capsules (2 g) by mouth every morning AND 2 capsules (2 g) every evening. 360 capsule 2     Omega-3-Acid Eth Est, Dietary, 1 g CAPS Take 2 capfuls by mouth 2 times daily 360 capsule 1     omeprazole (PRILOSEC) 20 MG DR capsule Take 1 capsule (20 mg) by mouth 2 times daily 180 capsule 3     omeprazole (PRILOSEC) 20 MG DR capsule Take 1 capsule (20 mg) by mouth 2 times daily 180 capsule 3     polyethylene glycol-propylene glycol (SYSTANE) 0.4-0.3 % SOLN ophthalmic solution Place 1 drop into both eyes 4 times daily 5 mL 11  "    sodium chloride (NEBUSAL) 3 % neb solution Take 3 mLs by nebulization daily Use 1-2 times daily in combination with Duoneb and Aerobika to help with mucus clearance 250 mL 3     Continuous Blood Gluc Sensor (FREESTYLE GIOVANNI 14 DAY SENSOR) MISC Change every 14 days. (Patient not taking: Reported on 8/4/2022) 2 each 11       Past Surgical History:   Procedure Laterality Date     2/8/16                R thoracotomy, RLL lobectomy (Dr. Cunha). Adenocarcinoma, 1.1 cm, assoicated with atypical adenomatous hyperplasia Right 02/08/2016    R thoracotomy, RLL lobectomy (Dr. Cunha). Adenocarcinoma, 1.1 cm, assoicated with atypical adenomatous hyperplasia       Past Medical History:   Diagnosis Date     Adenocarcinoma, lung (H)      Asthma      Ectopic pregnancy      Pulmonary emphysema (H)     Very severe FEV1<30% predicted     Type II diabetes mellitus (H)        Family History   Problem Relation Age of Onset     Depression Daughter      Alcohol/Drug Other         self     Diabetes Other         self     Thyroid Disease Other         self     Asthma Other         self        reports that she has been smoking cigarettes. She has been smoking about 0.25 packs per day. She has quit using smokeless tobacco. She reports current alcohol use. She reports that she does not use drugs.         Physical Exam:   /77   Pulse 96   Ht 1.676 m (5' 6\")   Wt 94.4 kg (208 lb 1.6 oz)   SpO2 93%   BMI 33.59 kg/m    Wt:   Wt Readings from Last 2 Encounters:   08/04/22 94.4 kg (208 lb 1.6 oz)   06/29/22 95 kg (209 lb 6.4 oz)      Constitutional: cooperative, pleasant, not dyspneic/diaphoretic, no acute distress  Eyes: Sclera anicteric/injected  Ears/nose/mouth/throat: Normal oropharynx without ulcers or exudate, mucus membranes moist, hearing intact  Neck: supple, thyroid normal size  Cardiac: Regular rate and rhythm no murmurs rubs or gallops.  CV: No edema  Respiratory: Unlabored breathing, no wheezes rales or ronchi  Lymph: No " axillary, submandibular, supraclavicular or inguinal lymphadenopathy  Abd: Nondistended, +bs, no hepatosplenomegaly, nontender, no peritoneal signs  Skin: warm, perfused, no jaundice  Neuro: AAO x 3, No asterixis  Psych: Normal affect  MSK: Normal gait    Wt Readings from Last 2 Encounters:   08/04/22 94.4 kg (208 lb 1.6 oz)   06/29/22 95 kg (209 lb 6.4 oz)             Again, thank you for allowing me to participate in the care of your patient.        Sincerely,        Travis Briseno MD

## 2022-08-05 ENCOUNTER — TELEPHONE (OUTPATIENT)
Dept: GASTROENTEROLOGY | Facility: CLINIC | Age: 67
End: 2022-08-05

## 2022-08-05 NOTE — TELEPHONE ENCOUNTER
Prior Authorization Approval    Authorization Effective Date: 7/6/2022  Authorization Expiration Date: 8/4/2025  Medication: omeprazole (PRILOSEC) 20 MG DR capsule--APPROVED  Approved Dose/Quantity:   Reference #:     Insurance Company: [employer] - Phone [phone] Fax [phone]  Expected CoPay:       CoPay Card Available:      Foundation Assistance Needed:    Which Pharmacy is filling the prescription (Not needed for infusion/clinic administered): [id number] - [place], [place] - [address] AT [facility]  Pharmacy Notified: Yes  Patient Notified: Yes **Instructed pharmacy to notify patient when script is ready to /ship.**

## 2022-08-05 NOTE — TELEPHONE ENCOUNTER
PA Initiation    Medication: omeprazole (PRILOSEC) 20 MG DR capsule   Insurance Company: [employer] - Phone [phone] Fax [phone]  Pharmacy [facility]illing the Rx: [id number] - [place], [place] - [address] AT [facility] [address]  Filling Pharmacy Phone: [phone]  Filling Pharmacy Fax: [phone]  Start Date: 8/5/2022

## 2022-08-08 ENCOUNTER — TELEPHONE (OUTPATIENT)
Dept: GASTROENTEROLOGY | Facility: CLINIC | Age: 67
End: 2022-08-08

## 2022-08-08 NOTE — TELEPHONE ENCOUNTER
Pre assessment questions completed for upcoming EGD procedure scheduled on 8/16/22    COVID policy reviewed. Patient to complete rapid antigen test one to two days before their scheduled procedure. Patient to bring photo of the results when they come in for their procedure.    Reviewed procedural arrival time 1245 and facility location .    Designated  policy reviewed. Instructed to have someone stay 6 hours post procedure.     Procedure indication: dysphagia     Reviewed EGD prep instructions with patient. NPO six hours prior     Anticoagulation/blood thinners? no    Patient verbalized understanding and had no questions or concerns at this time.    Patria Mcdonnell RN

## 2022-08-09 NOTE — TELEPHONE ENCOUNTER
Central Prior Authorization Team   Phone: [phone]      PA Initiation    Medication: Continuous Blood Gluc Sensor (FREESTYLE GIOVANNI 14 DAY SENSOR)   Insurance Company: [employer] - Phone [phone] Fax [phone]  Pharmacy Filling the Rx: [facility] - [facility] [address] [place] [address]  Filling Pharmacy Phone: [phone]  Filling Pharmacy Fax:    Start Date: 8/9/2022

## 2022-08-09 NOTE — TELEPHONE ENCOUNTER
PRIOR AUTHORIZATION DENIED    Medication: Continuous Blood Gluc Sensor (FREESTYLE GIOVANNI 14 DAY SENSOR) -DENIED    Denial Date: 8/9/2022    Denial Rational:           Appeal Information:

## 2022-08-09 NOTE — TELEPHONE ENCOUNTER
Can we do PA for this ?    Prior Authorization Retail Medication Request    Medication/Dose: Continuous Blood Gluc Sensor (FREESTYLE GIOVANNI 14 DAY SENSOR)   ICD code (if different than what is on RX):    Previously Tried and Failed:    Rationale:      Insurance Name:    Insurance ID:        Pharmacy Information (if different than what is on RX)  Name:    Phone:

## 2022-08-10 ENCOUNTER — OFFICE VISIT (OUTPATIENT)
Dept: PULMONOLOGY | Facility: CLINIC | Age: 67
End: 2022-08-10
Attending: PHYSICIAN ASSISTANT
Payer: COMMERCIAL

## 2022-08-10 VITALS
HEIGHT: 66 IN | HEART RATE: 95 BPM | SYSTOLIC BLOOD PRESSURE: 144 MMHG | DIASTOLIC BLOOD PRESSURE: 75 MMHG | OXYGEN SATURATION: 95 % | WEIGHT: 206 LBS | BODY MASS INDEX: 33.11 KG/M2

## 2022-08-10 DIAGNOSIS — I27.20 PULMONARY HYPERTENSION (H): ICD-10-CM

## 2022-08-10 DIAGNOSIS — C34.91 ADENOCARCINOMA OF RIGHT LUNG (H): ICD-10-CM

## 2022-08-10 DIAGNOSIS — J44.1 CHRONIC OBSTRUCTIVE PULMONARY DISEASE WITH ACUTE EXACERBATION (H): Primary | ICD-10-CM

## 2022-08-10 DIAGNOSIS — J43.9 PULMONARY EMPHYSEMA, UNSPECIFIED EMPHYSEMA TYPE (H): Primary | ICD-10-CM

## 2022-08-10 LAB
6 MIN WALK (FT): 540 FT
6 MIN WALK (M): 165 M

## 2022-08-10 PROCEDURE — 94618 PULMONARY STRESS TESTING: CPT | Performed by: INTERNAL MEDICINE

## 2022-08-10 PROCEDURE — 99214 OFFICE O/P EST MOD 30 MIN: CPT | Mod: 25 | Performed by: STUDENT IN AN ORGANIZED HEALTH CARE EDUCATION/TRAINING PROGRAM

## 2022-08-10 PROCEDURE — G0463 HOSPITAL OUTPT CLINIC VISIT: HCPCS | Mod: 25

## 2022-08-10 RX ORDER — PREDNISONE 20 MG/1
40 TABLET ORAL DAILY
Qty: 10 TABLET | Refills: 3 | Status: SHIPPED | OUTPATIENT
Start: 2022-08-10 | End: 2022-08-15

## 2022-08-10 RX ORDER — DOXYCYCLINE HYCLATE 100 MG
100 TABLET ORAL 2 TIMES DAILY
Qty: 5 TABLET | Refills: 3 | Status: ON HOLD | OUTPATIENT
Start: 2022-08-10 | End: 2023-02-24

## 2022-08-10 ASSESSMENT — PAIN SCALES - GENERAL: PAINLEVEL: NO PAIN (0)

## 2022-08-10 NOTE — LETTER
August 10, 2022      Jenn Aparicio  [address]   [place] [address]        To Whom It May Concern:    Jenn Aparicio  was seen on 8/10/22 for chronic lung (pulmonary) disease.  She has worsening breathing issues due to environmental triggers such as fumes, molds, smokes, and other irritants.  Per medical necessity, I request either remediation or removal of Ms. Aparicio from enviroments with these triggers.    With her permission, I am happy to answer any medical questions that may arise regarding this request.        Sincerely,        Kailee Moralez MD

## 2022-08-10 NOTE — PROGRESS NOTES
[facility]   Pulmonary Clinic  Consult Note 8/10/2022  Jenn Aparicio MRN: [id number]      Assessment & Plan      #Very severe COPD (FEV1 24% 12/2020)  Previously multiple exacerbations, mostly triggered by difficulty obtaining her inhalers as well as environmental triggers in her apartment building.  Since last visit, she was hospitalized once and also completed 2 courses of outpatient prednisone.  She now obtained a new inhaler with all 3 medications and thinks this will help her with ongoing compliance.  If not, she will likely need addition of roflumilast vs azithromycin to minimize risk for further exacerbations.  Also provided her a letter to support her relocation away from environmental exposures.    #Chronic hypoxemic respiratory failure (2L baseline) - resolved  Patient currently not wearing oxygen at rest or with exertion, but using at night instead.  6MWT does not indicate need for oxygen with exertion at this time.  May still need oxygen overnight, but sleep study pending scheduling.    #RLL adenocarcinoma s/p lobectomy (2/2016)  #RUL NSCLC s/p SBRT (1/2020)  Oncology note from 6/29/2022 reviewed.  Plan for follow up imaging to monitor new nodules noted on recent imaging.    #Suspected DAVID  Home sleep study 3/2021 suspicious for sleep apnea with recommendation for in-lab study which patient has not scheduled yet.  Renewed order for study.    #Enlarged pulmonary trunk on CT imaging  High concern for pulmonary HTN given chronic hypoxia, likely untreated/undiagnosed DAVID, and very severe COPD.  Will obtain echo as she has never had one.  Currently no symptoms concerning for primary cardiac disease.      Recommendations:    - Continue Trelegy (LAMA/LABA/ICS), SEAN prn   - COPD home action plan renewed: prednisone + doxycycline on hand   - Continue HTS nebs for chronic bronchitis symptoms   - Echo to evaluate pulmonary HTN & right heart function   - Obtain sleep study (referral renewed)   - Consider  pulmonary rehab.  Patient currently not interested.   - Letter provided for medical necessity   - RTC 3 months      Patient seen & discussed w/  Dr. Guillaume, who agrees with the above assessment and plan.    Kailee Moralez M.D.  Pulmonary and Critical Care Medicine Fellow  8/10/2022           Interval History / Subjective:   Jenn Aparicio is a 67 year old female with very severe COPD (FEV1 24% 12/2020), RLL adeno s/p lobectomy (2016), RUL NSCLC s/p SBRT (1/2020), ongoing tobacco use, DM2, and HTN who was established with pulmonary clinic 12/2020 after transferring her oncology care to [facility].    She reports today for a routine COPD follow up.  She was last seen 5/4/2022 in the pulmonary clinic by Dr. Spann.  At that time, she was treated for an exacerbation in clinic and changed from Incruse/Breo to Trelegy.    She was admitted 5/20 - 5/24 for a COPD exacerbation due to difficulties obtaining her outpatient inhalers as well as environmental triggers at home.    Patient reports that since that time, she improved quite a bit, but had to use her home prednisone again for a few days around 2 weeks ago.  She was having some wheezing and shortness of breath.  She denies any illnesses at that time, but thinks that the environment in her apartment is making her have recurrent difficulties breathing.  She notes several fumes, mold, and people smoking in her building.  She is touring other apartments and trying to find a better place to live.  She uses an air purifier but notes that she has to change the filter weekly even though they are monthly filters.            Social/Occupational/Exposures:     Patient is a off-and-on smoker w/ significant second hand exposure in her apartment building.  No known asbestos exposure.  No pets.  No bird exposure.  Significant mold reported in her apartment building.    Social History     Tobacco Use     Smoking status: Current Every Day Smoker     Packs/day: 0.25     Types: Cigarettes      Smokeless tobacco: Former User     Tobacco comment: 5/23/22 Patient using 7mg patch, 2 mg lozenges, took workbook   Substance Use Topics     Alcohol use: Yes     Comment: sometime     Drug use: No             Review of Symptoms:   10-point ROS reviewed, & found negative w/ exceptions noted in the HPI.          Past Medical History:     Past Medical History:   Diagnosis Date     Adenocarcinoma, lung (H)      Asthma      Ectopic pregnancy      Pulmonary emphysema (H)     Very severe FEV1<30% predicted     Type II diabetes mellitus (H)        Past Surgical History:   Procedure Laterality Date     2/8/16                R thoracotomy, RLL lobectomy (Dr. Cunha). Adenocarcinoma, 1.1 cm, assoicated with atypical adenomatous hyperplasia Right 02/08/2016    R thoracotomy, RLL lobectomy (Dr. Cunha). Adenocarcinoma, 1.1 cm, assoicated with atypical adenomatous hyperplasia            Allergies:     Allergies   Allergen Reactions     Interferons Dermatitis     Penicillins Hives     Aspirin      325mg      Colon Care              Outpatient Medications:     albuterol (PROAIR HFA/PROVENTIL HFA/VENTOLIN HFA) 108 (90 Base) MCG/ACT inhaler, INHALE 1 OR 2 PUFFS BY MOUTH EVERY 4 HOURS AS NEEDED  alcohol swab prep pads, Use to swab area of injection/xander as directed.  Alpha-Lipoic Acid 300 MG CAPS, Take 300 mg by mouth daily  amLODIPine (NORVASC) 10 MG tablet, Take 1 tablet (10 mg) by mouth daily  aspirin 81 MG EC tablet, Take 1 tablet (81 mg) by mouth daily  benzonatate (TESSALON) 200 MG capsule, Take 1 capsule (200 mg) by mouth 3 times daily as needed for cough  cetirizine (ZYRTEC) 10 MG tablet, Take 1 tablet (10 mg) by mouth daily  coenzyme Q-10 (CO-Q10) 50 MG capsule, Take 1 capsule (50 mg) by mouth daily  Continuous Blood Gluc Sensor (FREESTYLE GIOVANNI 14 DAY SENSOR) MIS, Change every 14 days.  cyclobenzaprine (FLEXERIL) 5 MG tablet, Take 1 tablet (5 mg) by mouth 3 times daily as needed for muscle spasms  Easy Comfort Lancets  MISC,   fluticasone (FLONASE) 50 MCG/ACT nasal spray, Spray 1 spray into both nostrils daily Use at night before bed  Fluticasone-Umeclidin-Vilanterol (TRELEGY ELLIPTA) 200-62.5-25 MCG/INH oral inhaler, Inhale 1 puff into the lungs daily  glipiZIDE (GLUCOTROL) 5 MG tablet, Take 1 tablet (5 mg) by mouth 2 times daily (before meals)  glucosamine-chondroitin 500-400 MG tablet, Take 1 tablet by mouth daily  ipratropium - albuterol 0.5 mg/2.5 mg/3 mL (DUONEB) 0.5-2.5 (3) MG/3ML neb solution, Take 1 vial (3 mLs) by nebulization every 6 hours as needed for shortness of breath / dyspnea or wheezing  ketotifen (ZADITOR) 0.025 % ophthalmic solution, Place 1 drop into both eyes daily  Lancet Devices (EASY MINI EJECT LANCING DEVICE) MISC,   multivitamin w/minerals (THERA-VIT-M) tablet, Take 1 tablet by mouth daily  omega-3 acid ethyl esters (LOVAZA) 1 g capsule, Take 2 capsules (2 g) by mouth every morning AND 2 capsules (2 g) every evening.  Omega-3-Acid Eth Est, Dietary, 1 g CAPS, Take 2 capfuls by mouth 2 times daily  omeprazole (PRILOSEC) 20 MG DR capsule, Take 1 capsule (20 mg) by mouth 2 times daily  omeprazole (PRILOSEC) 20 MG DR capsule, Take 1 capsule (20 mg) by mouth 2 times daily  polyethylene glycol-propylene glycol (SYSTANE) 0.4-0.3 % SOLN ophthalmic solution, Place 1 drop into both eyes 4 times daily  sodium chloride (NEBUSAL) 3 % neb solution, Take 3 mLs by nebulization daily Use 1-2 times daily in combination with Duoneb and Aerobika to help with mucus clearance    No current facility-administered medications on file prior to visit.            Family History:     Family History   Problem Relation Age of Onset     Depression Daughter      Alcohol/Drug Other         self     Diabetes Other         self     Thyroid Disease Other         self     Asthma Other         self               Physical Exam:   BP (!) 144/75 (BP Location: Right arm, Patient Position: Chair, Cuff Size: Adult Regular)   Pulse 95   Ht 1.676 m  "(5' 6\")   Wt 93.4 kg (206 lb)   SpO2 95%   BMI 33.25 kg/m      General: middle aged female in no acute distress, using a walker  HENT: external ears without visible abnormalities, no rhinorrhea, no epistaxis  Lungs: decreased breath sounds in all lung fields, on room air, no accessory muscle use  Heart: RRR, no murmurs  Abdomen: soft, non-distended, bowel tones present  Extremities: no bilateral pitting edema, no clubbing or cyanosis  Skin: no visible rashes, no mottling  Neurologic: moving all 4 extremities spontaneously, awake and alert  Psych: full affect, appropriate insight, appropriate judgment          Data:   Labs: notable labs in HPI above.    Imaging and other diagnostic testing (all imaging studies reviewed by me)    CT chest 6/24/2022: RUL nodules stable compared to 3/31/2022; ROBERT nodule stable compared to 5/20/2022; mildly enlarged pulmonary trunk (3 cm)    PFTs 12/2020:        6MWT 8/10/2022: 540 feet, 97% -> 90% on room air (previous walk in 12/2020 for 480 feet)    Transthoracic echocardiogram: none available    "

## 2022-08-10 NOTE — NURSING NOTE
Chief Complaint   Patient presents with     Follow Up     Vitals were taken and medications were reconciled.     Eloise Duarte RMA  12:47 PM

## 2022-08-10 NOTE — LETTER
August 10, 2022      Jenn Aparicio  [address]   [place] [address]        To Whom It May Concern:    Jenn Aparicio  was seen on 8/10/22 for chronic lung (pulmonary) disease.  Her breathing issues are made worse by inhaled particles, molds, fumes, and other environmental factors.  Per her history, these are worse at her current apartment and should be avoided due to medical reasons either through remediation or relocation.    With her permission, I would be happy to answer any additional questions regarding her medical condition.      Sincerely,        Kailee Moralez MD

## 2022-08-16 ENCOUNTER — HOSPITAL ENCOUNTER (OUTPATIENT)
Facility: CLINIC | Age: 67
Discharge: HOME | End: 2022-08-16
Attending: INTERNAL MEDICINE | Admitting: INTERNAL MEDICINE
Payer: COMMERCIAL

## 2022-08-16 VITALS
HEIGHT: 66 IN | DIASTOLIC BLOOD PRESSURE: 87 MMHG | BODY MASS INDEX: 32.14 KG/M2 | HEART RATE: 103 BPM | OXYGEN SATURATION: 96 % | RESPIRATION RATE: 25 BRPM | WEIGHT: 200 LBS | SYSTOLIC BLOOD PRESSURE: 147 MMHG

## 2022-08-16 LAB — UPPER GI ENDOSCOPY: NORMAL

## 2022-08-16 PROCEDURE — 43235 EGD DIAGNOSTIC BRUSH WASH: CPT | Performed by: INTERNAL MEDICINE

## 2022-08-16 PROCEDURE — 999N000099 HC STATISTIC MODERATE SEDATION < 10 MIN: Performed by: INTERNAL MEDICINE

## 2022-08-16 PROCEDURE — 250N000011 HC RX IP 250 OP 636: Performed by: INTERNAL MEDICINE

## 2022-08-16 RX ORDER — LIDOCAINE 40 MG/G
CREAM TOPICAL
Status: CANCELLED | OUTPATIENT
Start: 2022-08-16

## 2022-08-16 RX ORDER — PROCHLORPERAZINE MALEATE 5 MG
5 TABLET ORAL EVERY 6 HOURS PRN
Status: CANCELLED | OUTPATIENT
Start: 2022-08-16

## 2022-08-16 RX ORDER — NALOXONE HYDROCHLORIDE 0.4 MG/ML
0.4 INJECTION, SOLUTION INTRAMUSCULAR; INTRAVENOUS; SUBCUTANEOUS
Status: CANCELLED | OUTPATIENT
Start: 2022-08-16

## 2022-08-16 RX ORDER — NALOXONE HYDROCHLORIDE 0.4 MG/ML
0.2 INJECTION, SOLUTION INTRAMUSCULAR; INTRAVENOUS; SUBCUTANEOUS
Status: CANCELLED | OUTPATIENT
Start: 2022-08-16

## 2022-08-16 RX ORDER — FENTANYL CITRATE 50 UG/ML
INJECTION, SOLUTION INTRAMUSCULAR; INTRAVENOUS PRN
Status: COMPLETED | OUTPATIENT
Start: 2022-08-16 | End: 2022-08-16

## 2022-08-16 RX ORDER — ONDANSETRON 4 MG/1
4 TABLET, ORALLY DISINTEGRATING ORAL EVERY 6 HOURS PRN
Status: CANCELLED | OUTPATIENT
Start: 2022-08-16

## 2022-08-16 RX ORDER — SODIUM CHLORIDE, SODIUM LACTATE, POTASSIUM CHLORIDE, CALCIUM CHLORIDE 600; 310; 30; 20 MG/100ML; MG/100ML; MG/100ML; MG/100ML
INJECTION, SOLUTION INTRAVENOUS CONTINUOUS
Status: CANCELLED | OUTPATIENT
Start: 2022-08-16

## 2022-08-16 RX ORDER — ONDANSETRON 2 MG/ML
4 INJECTION INTRAMUSCULAR; INTRAVENOUS
Status: CANCELLED | OUTPATIENT
Start: 2022-08-16

## 2022-08-16 RX ORDER — FLUMAZENIL 0.1 MG/ML
0.2 INJECTION, SOLUTION INTRAVENOUS
Status: CANCELLED | OUTPATIENT
Start: 2022-08-16 | End: 2022-08-17

## 2022-08-16 RX ORDER — ONDANSETRON 2 MG/ML
4 INJECTION INTRAMUSCULAR; INTRAVENOUS EVERY 6 HOURS PRN
Status: CANCELLED | OUTPATIENT
Start: 2022-08-16

## 2022-08-16 RX ADMIN — FENTANYL CITRATE 50 MCG: 50 INJECTION, SOLUTION INTRAMUSCULAR; INTRAVENOUS at 13:34

## 2022-08-16 RX ADMIN — FENTANYL CITRATE 25 MCG: 50 INJECTION, SOLUTION INTRAMUSCULAR; INTRAVENOUS at 13:36

## 2022-08-16 RX ADMIN — MIDAZOLAM 1 MG: 1 INJECTION INTRAMUSCULAR; INTRAVENOUS at 13:39

## 2022-08-16 RX ADMIN — MIDAZOLAM 1 MG: 1 INJECTION INTRAMUSCULAR; INTRAVENOUS at 13:36

## 2022-08-16 RX ADMIN — MIDAZOLAM 1 MG: 1 INJECTION INTRAMUSCULAR; INTRAVENOUS at 13:37

## 2022-08-16 RX ADMIN — FENTANYL CITRATE 25 MCG: 50 INJECTION, SOLUTION INTRAMUSCULAR; INTRAVENOUS at 13:39

## 2022-08-16 RX ADMIN — MIDAZOLAM 2 MG: 1 INJECTION INTRAMUSCULAR; INTRAVENOUS at 13:34

## 2022-08-16 ASSESSMENT — ACTIVITIES OF DAILY LIVING (ADL)
ADLS_ACUITY_SCORE: 35
ADLS_ACUITY_SCORE: 35

## 2022-08-16 NOTE — H&P
Jenn Aparicio  [id number]  female  67 year old      Reason for procedure/surgery: Dysphagia     Patient Active Problem List   Diagnosis     Vaginitis     Postmenopausal bleeding     Cervical stenosis (uterine cervix)     Pulmonary emphysema (H)     Type 2 diabetes mellitus without complication, without long-term current use of insulin (H)     Primary lung adenocarcinoma (H) STAGE: IA2 [id number] poorly diff adeno RLL, then [id number] IA2 suspected NSCLC RUL, medically inoperable      Chronic obstructive pulmonary disease, unspecified COPD type (H)     Malnutrition (H)     Diabetic neuropathy (H)     Hypoxia     Shortness of breath     Chest pain, unspecified type     Hypokalemia     Gastroesophageal reflux disease without esophagitis     Esophageal dysphagia       Past Surgical History:    Past Surgical History:   Procedure Laterality Date     2/8/16                R thoracotomy, RLL lobectomy (Dr. Cunha). Adenocarcinoma, 1.1 cm, assoicated with atypical adenomatous hyperplasia Right 02/08/2016    R thoracotomy, RLL lobectomy (Dr. Cunha). Adenocarcinoma, 1.1 cm, assoicated with atypical adenomatous hyperplasia     ORTHOPEDIC SURGERY         Past Medical History:   Past Medical History:   Diagnosis Date     Adenocarcinoma, lung (H)      Asthma      Ectopic pregnancy      Esophageal reflux      Pulmonary emphysema (H)     Very severe FEV1<30% predicted     Type II diabetes mellitus (H)        Social History:   Social History     Tobacco Use     Smoking status: Current Every Day Smoker     Packs/day: 0.25     Types: Cigarettes     Smokeless tobacco: Former User     Tobacco comment: 5/23/22 Patient using 7mg patch, 2 mg lozenges, took workbook   Substance Use Topics     Alcohol use: Yes     Comment: sometime       Family History:   Family History   Problem Relation Age of Onset     Lung Cancer Sister      Depression Daughter      Alcohol/Drug Other         self     Diabetes Other         self     Thyroid Disease Other      "    self     Asthma Other         self       Allergies:   Allergies   Allergen Reactions     Interferons Dermatitis     Penicillins Hives     Aspirin      325mg      Colon Care        Active Medications:   No current outpatient medications on file.       Systemic Review:   CONSTITUTIONAL: NEGATIVE for fever, chills, change in weight  ENT/MOUTH: NEGATIVE for ear, mouth and throat problems  RESP: NEGATIVE for significant cough or SOB  CV: NEGATIVE for chest pain, palpitations or peripheral edema    Physical Examination:   Vital Signs: Resp 18   Ht 1.676 m (5' 6\")   Wt 90.7 kg (200 lb)   BMI 32.28 kg/m    GENERAL: healthy, alert and no distress  NECK: no adenopathy, no asymmetry, masses, or scars  RESP: lungs clear to auscultation - no rales, rhonchi or wheezes  CV: regular rate and rhythm, normal S1 S2, no S3 or S4, no murmur, click or rub, no peripheral edema and peripheral pulses strong  ABDOMEN: soft, nontender, no hepatosplenomegaly, no masses and bowel sounds normal  MS: no gross musculoskeletal defects noted, no edema    ASA: 2    Mallampati Score: 2    Plan: Appropriate to proceed as scheduled.      Travis Briseno MD  8/16/2022    PCP:  Mitzi Nettles    "

## 2022-08-17 DIAGNOSIS — J30.2 SEASONAL ALLERGIC RHINITIS, UNSPECIFIED TRIGGER: ICD-10-CM

## 2022-08-22 RX ORDER — FLUTICASONE PROPIONATE 50 MCG
1 SPRAY, SUSPENSION (ML) NASAL DAILY
Qty: 15.8 ML | Refills: 1 | Status: SHIPPED | OUTPATIENT
Start: 2022-08-22 | End: 2023-01-17

## 2022-08-26 ENCOUNTER — TELEPHONE (OUTPATIENT)
Dept: PULMONOLOGY | Facility: CLINIC | Age: 67
End: 2022-08-26

## 2022-08-26 NOTE — TELEPHONE ENCOUNTER
[facility] Call Center    Phone Message    May a detailed message be left on voicemail: yes     Reason for Call: Other: Per pt is having bruises on her arm legs and inner things. Per pt had the ones on her arms for a few days then they disappear. Per pt currently have some on her legs and things. Per pt is wondering if it due to the medication she lay? Please call and let pt know that she should or can do at this time. plese nad thank you!      Action Taken: Message routed to:  [facility] PULM    Travel Screening: Not Applicable

## 2022-08-26 NOTE — TELEPHONE ENCOUNTER
This should not be related to any of her currently documented medications or her pulmonary treatments.  I recommend that she make an appointment with her PCP to have them evaluated.    Addendum 8/31 4881: Per patient's PCP, it would not be related to patient's current medication list that is is Epic, and her last platelet count was normal. Patient agrees to make appointment with PCP if she would like it investigated further.

## 2022-08-31 ENCOUNTER — ONCOLOGY VISIT (OUTPATIENT)
Dept: ONCOLOGY | Facility: CLINIC | Age: 67
End: 2022-08-31
Attending: INTERNAL MEDICINE
Payer: COMMERCIAL

## 2022-08-31 ENCOUNTER — ANCILLARY PROCEDURE (OUTPATIENT)
Dept: CT IMAGING | Facility: CLINIC | Age: 67
End: 2022-08-31
Attending: INTERNAL MEDICINE
Payer: COMMERCIAL

## 2022-08-31 VITALS
TEMPERATURE: 98.8 F | DIASTOLIC BLOOD PRESSURE: 71 MMHG | RESPIRATION RATE: 16 BRPM | WEIGHT: 206.7 LBS | HEART RATE: 94 BPM | SYSTOLIC BLOOD PRESSURE: 135 MMHG | OXYGEN SATURATION: 98 % | BODY MASS INDEX: 33.36 KG/M2

## 2022-08-31 DIAGNOSIS — C34.90 PRIMARY ADENOCARCINOMA OF LUNG, UNSPECIFIED LATERALITY (H): ICD-10-CM

## 2022-08-31 DIAGNOSIS — C34.31 MALIGNANT NEOPLASM OF LOWER LOBE OF RIGHT LUNG (H): ICD-10-CM

## 2022-08-31 DIAGNOSIS — Z13.29 SCREENING FOR HYPOTHYROIDISM: ICD-10-CM

## 2022-08-31 DIAGNOSIS — C34.90 PRIMARY ADENOCARCINOMA OF LUNG, UNSPECIFIED LATERALITY (H): Primary | ICD-10-CM

## 2022-08-31 DIAGNOSIS — G62.89 OTHER POLYNEUROPATHY: ICD-10-CM

## 2022-08-31 DIAGNOSIS — E11.9 TYPE 2 DIABETES MELLITUS WITHOUT COMPLICATION, WITHOUT LONG-TERM CURRENT USE OF INSULIN (H): ICD-10-CM

## 2022-08-31 LAB
ALBUMIN SERPL BCG-MCNC: 4.4 G/DL (ref 3.5–5.2)
ALP SERPL-CCNC: 109 U/L (ref 35–104)
ALT SERPL W P-5'-P-CCNC: 9 U/L (ref 10–35)
ANION GAP SERPL CALCULATED.3IONS-SCNC: 14 MMOL/L (ref 7–15)
AST SERPL W P-5'-P-CCNC: 17 U/L (ref 10–35)
BASOPHILS # BLD AUTO: 0 10E3/UL (ref 0–0.2)
BASOPHILS NFR BLD AUTO: 0 %
BILIRUB SERPL-MCNC: 0.4 MG/DL
BUN SERPL-MCNC: 6.5 MG/DL (ref 8–23)
CALCIUM SERPL-MCNC: 9.7 MG/DL (ref 8.8–10.2)
CHLORIDE SERPL-SCNC: 99 MMOL/L (ref 98–107)
CREAT SERPL-MCNC: 0.46 MG/DL (ref 0.51–0.95)
DEPRECATED HCO3 PLAS-SCNC: 29 MMOL/L (ref 22–29)
EOSINOPHIL # BLD AUTO: 0.2 10E3/UL (ref 0–0.7)
EOSINOPHIL NFR BLD AUTO: 3 %
ERYTHROCYTE [DISTWIDTH] IN BLOOD BY AUTOMATED COUNT: 13.5 % (ref 10–15)
GFR SERPL CREATININE-BSD FRML MDRD: >90 ML/MIN/1.73M2
GLUCOSE SERPL-MCNC: 184 MG/DL (ref 70–99)
HBA1C MFR BLD: 9.8 %
HCT VFR BLD AUTO: 46.7 % (ref 35–47)
HGB BLD-MCNC: 14.7 G/DL (ref 11.7–15.7)
IMM GRANULOCYTES # BLD: 0 10E3/UL
IMM GRANULOCYTES NFR BLD: 0 %
LYMPHOCYTES # BLD AUTO: 2.4 10E3/UL (ref 0.8–5.3)
LYMPHOCYTES NFR BLD AUTO: 33 %
MCH RBC QN AUTO: 29 PG (ref 26.5–33)
MCHC RBC AUTO-ENTMCNC: 31.5 G/DL (ref 31.5–36.5)
MCV RBC AUTO: 92 FL (ref 78–100)
MONOCYTES # BLD AUTO: 0.5 10E3/UL (ref 0–1.3)
MONOCYTES NFR BLD AUTO: 7 %
NEUTROPHILS # BLD AUTO: 4.1 10E3/UL (ref 1.6–8.3)
NEUTROPHILS NFR BLD AUTO: 57 %
NRBC # BLD AUTO: 0 10E3/UL
NRBC BLD AUTO-RTO: 0 /100
PLATELET # BLD AUTO: 205 10E3/UL (ref 150–450)
POTASSIUM SERPL-SCNC: 3.7 MMOL/L (ref 3.4–5.3)
PROT SERPL-MCNC: 7.4 G/DL (ref 6.4–8.3)
RBC # BLD AUTO: 5.07 10E6/UL (ref 3.8–5.2)
SODIUM SERPL-SCNC: 142 MMOL/L (ref 136–145)
WBC # BLD AUTO: 7.2 10E3/UL (ref 4–11)

## 2022-08-31 PROCEDURE — 85025 COMPLETE CBC W/AUTO DIFF WBC: CPT | Performed by: INTERNAL MEDICINE

## 2022-08-31 PROCEDURE — 99215 OFFICE O/P EST HI 40 MIN: CPT | Performed by: INTERNAL MEDICINE

## 2022-08-31 PROCEDURE — 36415 COLL VENOUS BLD VENIPUNCTURE: CPT | Performed by: INTERNAL MEDICINE

## 2022-08-31 PROCEDURE — 71250 CT THORAX DX C-: CPT | Mod: GC | Performed by: RADIOLOGY

## 2022-08-31 PROCEDURE — 83036 HEMOGLOBIN GLYCOSYLATED A1C: CPT | Performed by: INTERNAL MEDICINE

## 2022-08-31 PROCEDURE — 80053 COMPREHEN METABOLIC PANEL: CPT | Performed by: INTERNAL MEDICINE

## 2022-08-31 PROCEDURE — G0463 HOSPITAL OUTPT CLINIC VISIT: HCPCS

## 2022-08-31 PROCEDURE — 82040 ASSAY OF SERUM ALBUMIN: CPT | Performed by: INTERNAL MEDICINE

## 2022-08-31 PROCEDURE — 84443 ASSAY THYROID STIM HORMONE: CPT | Performed by: INTERNAL MEDICINE

## 2022-08-31 PROCEDURE — 82607 VITAMIN B-12: CPT | Performed by: INTERNAL MEDICINE

## 2022-08-31 ASSESSMENT — PAIN SCALES - GENERAL: PAINLEVEL: NO PAIN (0)

## 2022-08-31 NOTE — NURSING NOTE
Venipuncture done in right AC space in clinic, pt tolerated. Specimen sent to lab, see flowsheet for additional details.    Jojo Hood CMA on 8/31/2022 at 4:14 PM

## 2022-08-31 NOTE — LETTER
8/31/2022         RE: Jenn Aparicio  [address]  [place] [address]        Dear Colleague,    Thank you for referring your patient, Jenn Aparicio, to the [facility]. Please see a copy of my visit note below.       [facility]    PATIENT NAME: Jenn Aparicio  MRN # [id number]   DATE OF VISIT: August 31, 2022  YOB: 1955     CANCER TYPE: Lung adenocarcinoma  STAGE: IA2 [id number] poorly diff adeno RLL, then [id number] IA2 suspected NSCLC RUL, medically inoperable     ECOG PS: 1    PD-L1: N/A  Lung panel: N/A  NGS: N/A    SUMMARY  12/24/15 PET/CT  1/13/15 CT lung bx. Adenocarcinoma  2/8/16 R thoracotomy, RLL lobectomy (Dr. Cunha). Adenocarcinoma, 1.1 cm, assoicated with atypical adenomatous hyperplasia  10/18/19 CTA for shortness of breath. New 1.3 cm RUL nodule  11/2019 PET/CT. 1.1 cm RUL hypermetabolic nodule (SUV 12.6)  12/26/19 Brain MRI negative for mets  1/14~1/28/20 SBRT to RUL nodule, 5000 cGy, every other day, 1000 cGy (Dr. Currie at [facility]). No bx due to O2 dependence and too much risk  8/19/20 First visit with me   11/29/21 CT chest. New 10 mm RUL nodule  1/11/22 CT chest. New 7.2 mm RUL nodule, unchanged RUL nodules  3/31/22 CT chest. New RUL nodule from Jan 2022 7-->10 mm, new 5 mm ROBERT nodule  5/20~5/24/22 [facility] for COPD exacerbation.   6/24/22 CT chest non contrast.   8/16/22 EGD. 3 cm hiatal hernia, o/w normal  8/31/22 CT chest. 2.5 x 1.3 cm R midlung subpleural nodularity (4:98) previously 2.3 x 1.1 cm, slightly increased solid component     ASSESSMENT AND PLAN   NSCLC, adeno, RUL: There's an increasing solid component in the R lung. There's another area a little higher up that looks a little worse to me. I think the other nodules look about the same. See snapshots below. I measured the solid components and they're more than 1 cm so a PET/CT can be useful. Will get that done in the coming weeks and I'll see her afterward. Bx would be difficult  due to location and underlying COPD     Peripheral neuropathy: Worse than before. Suspect related to DM2. No PCP right now. Check A1c, TSH, B12.     DM2: Between PCPs as above. A1c as above. Refer to Endocrinology. She is interested in a continuous monitor.     COPD: Recently got new inhalers, seems to be going ok. TTE upcoming to evaluate for pulm HTN as an alternate etiology to increased shortness of breath    Social: Finally getting a new apartment in the next month, much safer, no mold     40 minutes spent on the date of the encounter doing chart review, history and exam, documentation and further activities per the note     Zarina Leung MD  Associate Professor of Medicine  Hematology, Oncology and Transplantation      SUBJECTIVE  Jenn returns today for follow up of h/o lung cancer. We've been watching a RUL nodule and then new ROBERT nodules that showed up in March, stable on the 6/24/22 CT chest non contrast.   Hands are burning - couple of weeks. A little bit in the feet  Scheduled for TTE to evaluate for pulm HTN, in response to enlarged main pulmonary trunk  Still winded, no change  Moving to a new apt - very excited  Eating ok  No other new problems  Chronic LBP not terrible right now, comes and goes     PAST MEDICAL HISTORY  Lung adenocarcinoma  DM2 with neuropathy  HCV IA, undetectable in 2019, treated  COPD. O2 dependent since late 2018. PFT 11/26/19. FEV1 0.64 (30%), FVC 1.69 (63%), DLCOunc 9.8 (38%).  HTN  H/o ITP  H/o disk herniation  Ankle surgery  Median neuropathy R  H/o sessile colon polyps 2014, h/o adenomas before that. [place]oscopy 2/7/[place] [facility]. Sessile serated adenoma x 1, tubular adenoma x 3  H/o chronic LBP  Dyslipidemia  Cataracts    CURRENT OUTPATIENT MEDICATIONS  Current Outpatient Medications   Medication Sig Dispense Refill     albuterol (PROAIR HFA/PROVENTIL HFA/VENTOLIN HFA) 108 (90 Base) MCG/ACT inhaler INHALE 1 OR 2 PUFFS BY MOUTH EVERY 4 HOURS AS NEEDED 18 g 3     alcohol swab  prep pads Use to swab area of injection/xander as directed. 100 each 1     Alpha-Lipoic Acid 300 MG CAPS Take 300 mg by mouth daily 90 capsule 3     amLODIPine (NORVASC) 10 MG tablet Take 1 tablet (10 mg) by mouth daily 90 tablet 3     aspirin 81 MG EC tablet Take 1 tablet (81 mg) by mouth daily 90 tablet 3     benzonatate (TESSALON) 200 MG capsule Take 1 capsule (200 mg) by mouth 3 times daily as needed for cough 100 capsule 3     cetirizine (ZYRTEC) 10 MG tablet Take 1 tablet (10 mg) by mouth daily 30 tablet 10     coenzyme Q-10 (CO-Q10) 50 MG capsule Take 1 capsule (50 mg) by mouth daily 90 capsule 3     Continuous Blood Gluc Sensor (FREESTYLE GIVOANNI 14 DAY SENSOR) MISC Change every 14 days. 2 each 11     cyclobenzaprine (FLEXERIL) 5 MG tablet Take 1 tablet (5 mg) by mouth 3 times daily as needed for muscle spasms 30 tablet 0     doxycycline hyclate (VIBRA-TABS) 100 MG tablet Take 1 tablet (100 mg) by mouth 2 times daily 5 tablet 3     Easy Comfort Lancets MISC        fluticasone (FLONASE) 50 MCG/ACT nasal spray Spray 1 spray into both nostrils daily Use at night before bed 15.8 mL 1     Fluticasone-Umeclidin-Vilanterol (TRELEGY ELLIPTA) 200-62.5-25 MCG/INH oral inhaler Inhale 1 puff into the lungs daily 28 each 5     glipiZIDE (GLUCOTROL) 5 MG tablet Take 1 tablet (5 mg) by mouth 2 times daily (before meals) 180 tablet 3     glucosamine-chondroitin 500-400 MG tablet Take 1 tablet by mouth daily 90 tablet 3     ipratropium - albuterol 0.5 mg/2.5 mg/3 mL (DUONEB) 0.5-2.5 (3) MG/3ML neb solution Take 1 vial (3 mLs) by nebulization every 6 hours as needed for shortness of breath / dyspnea or wheezing 1080 mL 0     ketotifen (ZADITOR) 0.025 % ophthalmic solution Place 1 drop into both eyes daily       Lancet Devices (EASY MINI EJECT LANCING DEVICE) MISC        multivitamin w/minerals (THERA-VIT-M) tablet Take 1 tablet by mouth daily 30 tablet 11     omega-3 acid ethyl esters (LOVAZA) 1 g capsule Take 2 capsules (2 g)  by mouth every morning AND 2 capsules (2 g) every evening. 360 capsule 2     Omega-3-Acid Eth Est, Dietary, 1 g CAPS Take 2 capfuls by mouth 2 times daily 360 capsule 1     omeprazole (PRILOSEC) 20 MG DR capsule Take 1 capsule (20 mg) by mouth 2 times daily 180 capsule 3     omeprazole (PRILOSEC) 20 MG DR capsule Take 1 capsule (20 mg) by mouth 2 times daily 180 capsule 3     polyethylene glycol-propylene glycol (SYSTANE) 0.4-0.3 % SOLN ophthalmic solution Place 1 drop into both eyes 4 times daily 5 mL 11     sodium chloride (NEBUSAL) 3 % neb solution Take 3 mLs by nebulization daily Use 1-2 times daily in combination with Duoneb and Aerobika to help with mucus clearance 250 mL 3     ALLERGIES  Allergies   Allergen Reactions     Interferons Dermatitis     Penicillins Hives     Aspirin      325mg      Colon Care       REVIEW OF SYSTEMS  As above in the HPI, o/w complete 12-point ROS was negative.    PHYSICAL EXAM  /71   Pulse 94   Temp 98.8  F (37.1  C) (Oral)   Resp 16   Wt 93.8 kg (206 lb 11.2 oz)   SpO2 98%   BMI 33.36 kg/m    GEN: NAD  HEENT: EOMI, no icterus, injection or pallor  NEURO: alert    LABORATORY AND IMAGING STUDIES   08/31/22 16:09   Sodium 142   Potassium 3.7   Chloride 99   Carbon Dioxide (CO2) 29   Urea Nitrogen 6.5 (L)   Creatinine 0.46 (L)   GFR Estimate >90   Calcium 9.7   Anion Gap 14   Albumin 4.4   Protein Total 7.4   Alkaline Phosphatase 109 (H)   ALT 9 (L)   AST 17   Bilirubin Total 0.4   Glucose 184 (H)   WBC 7.2   Hemoglobin 14.7   Hematocrit 46.7   Platelet Count 205   RBC Count 5.07   MCV 92   MCH 29.0   MCHC 31.5   RDW 13.5   % Neutrophils 57   % Lymphocytes 33   % Monocytes 7   % Eosinophils 3   % Basophils 0   Absolute Basophils 0.0   Absolute Eosinophils 0.2   Absolute Immature Granulocytes 0.0   Absolute Lymphocytes 2.4   Absolute Monocytes 0.5   % Immature Granulocytes 0   Absolute Neutrophils 4.1   Absolute NRBCs 0.0   NRBCs per 100 WBC 0     Labs were independently  reviewed and interpreted by me    Imaging was personally reviewed and interpreted by me    4:98      4:118           Again, thank you for allowing me to participate in the care of your patient.      Sincerely,    Zarina Leung MD

## 2022-08-31 NOTE — PROGRESS NOTES
[facility]    PATIENT NAME: Jenn Aparicio  MRN # [id number]   DATE OF VISIT: August 31, 2022  YOB: 1955     CANCER TYPE: Lung adenocarcinoma  STAGE: IA2 [id number] poorly diff adeno RLL, then [id number] IA2 suspected NSCLC RUL, medically inoperable     ECOG PS: 1    PD-L1: N/A  Lung panel: N/A  NGS: N/A    SUMMARY  12/24/15 PET/CT  1/13/15 CT lung bx. Adenocarcinoma  2/8/16 R thoracotomy, RLL lobectomy (Dr. Cunha). Adenocarcinoma, 1.1 cm, assoicated with atypical adenomatous hyperplasia  10/18/19 CTA for shortness of breath. New 1.3 cm RUL nodule  11/2019 PET/CT. 1.1 cm RUL hypermetabolic nodule (SUV 12.6)  12/26/19 Brain MRI negative for mets  1/14~1/28/20 SBRT to RUL nodule, 5000 cGy, every other day, 1000 cGy (Dr. Currie at [facility] [place], [place]). No bx due to O2 dependence and too much risk  8/19/20 First visit with me   11/29/21 CT chest. New 10 mm RUL nodule  1/11/22 CT chest. New 7.2 mm RUL nodule, unchanged RUL nodules  3/31/22 CT chest. New RUL nodule from Jan 2022 7-->10 mm, new 5 mm ROBERT nodule  5/20~5/24/22 [facility] for COPD exacerbation.   6/24/22 CT chest non contrast.   8/16/22 EGD. 3 cm hiatal hernia, o/w normal  8/31/22 CT chest. 2.5 x 1.3 cm R midlung subpleural nodularity (4:98) previously 2.3 x 1.1 cm, slightly increased solid component     ASSESSMENT AND PLAN   NSCLC, adeno, RUL: There's an increasing solid component in the R lung. There's another area a little higher up that looks a little worse to me. I think the other nodules look about the same. See snapshots below. I measured the solid components and they're more than 1 cm so a PET/CT can be useful. Will get that done in the coming weeks and I'll see her afterward. Bx would be difficult due to location and underlying COPD     Peripheral neuropathy: Worse than before. Suspect related to DM2. No PCP right now. Check A1c, TSH, B12.     DM2: Between PCPs as above. A1c as above. Refer to Endocrinology. She is interested in a continuous monitor.      COPD: Recently got new inhalers, seems to be going ok. TTE upcoming to evaluate for pulm HTN as an alternate etiology to increased shortness of breath    Social: Finally getting a new apartment in the next month, much safer, no mold     40 minutes spent on the date of the encounter doing chart review, history and exam, documentation and further activities per the note     Zarina Leung MD  Associate Professor of Medicine  Hematology, Oncology and Transplantation      SUBJECTIVE  Jenn returns today for follow up of h/o lung cancer. We've been watching a RUL nodule and then new ROBERT nodules that showed up in March, stable on the 6/24/22 CT chest non contrast.   Hands are burning - couple of weeks. A little bit in the feet  Scheduled for TTE to evaluate for pulm HTN, in response to enlarged main pulmonary trunk  Still winded, no change  Moving to a new apt - very excited  Eating ok  No other new problems  Chronic LBP not terrible right now, comes and goes     PAST MEDICAL HISTORY  Lung adenocarcinoma  DM2 with neuropathy  HCV IA, undetectable in 2019, treated  COPD. O2 dependent since late 2018. PFT 11/26/19. FEV1 0.64 (30%), FVC 1.69 (63%), DLCOunc 9.8 (38%).  HTN  H/o ITP  H/o disk herniation  Ankle surgery  Median neuropathy R  H/o sessile colon polyps 2014, h/o adenomas before that. Colonoscopy 2/7/18 @ [facility]. Sessile serated adenoma x 1, tubular adenoma x 3  H/o chronic LBP  Dyslipidemia  Cataracts    CURRENT OUTPATIENT MEDICATIONS  Current Outpatient Medications   Medication Sig Dispense Refill     albuterol (PROAIR HFA/PROVENTIL HFA/VENTOLIN HFA) 108 (90 Base) MCG/ACT inhaler INHALE 1 OR 2 PUFFS BY MOUTH EVERY 4 HOURS AS NEEDED 18 g 3     alcohol swab prep pads Use to swab area of injection/xander as directed. 100 each 1     Alpha-Lipoic Acid 300 MG CAPS Take 300 mg by mouth daily 90 capsule 3     amLODIPine (NORVASC) 10 MG tablet Take 1 tablet (10 mg) by mouth daily 90 tablet 3     aspirin 81 MG EC tablet  Take 1 tablet (81 mg) by mouth daily 90 tablet 3     benzonatate (TESSALON) 200 MG capsule Take 1 capsule (200 mg) by mouth 3 times daily as needed for cough 100 capsule 3     cetirizine (ZYRTEC) 10 MG tablet Take 1 tablet (10 mg) by mouth daily 30 tablet 10     coenzyme Q-10 (CO-Q10) 50 MG capsule Take 1 capsule (50 mg) by mouth daily 90 capsule 3     Continuous Blood Gluc Sensor (FREESTYLE GIOVANNI 14 DAY SENSOR) MISC Change every 14 days. 2 each 11     cyclobenzaprine (FLEXERIL) 5 MG tablet Take 1 tablet (5 mg) by mouth 3 times daily as needed for muscle spasms 30 tablet 0     doxycycline hyclate (VIBRA-TABS) 100 MG tablet Take 1 tablet (100 mg) by mouth 2 times daily 5 tablet 3     Easy Comfort Lancets MISC        fluticasone (FLONASE) 50 MCG/ACT nasal spray Spray 1 spray into both nostrils daily Use at night before bed 15.8 mL 1     Fluticasone-Umeclidin-Vilanterol (TRELEGY ELLIPTA) 200-62.5-25 MCG/INH oral inhaler Inhale 1 puff into the lungs daily 28 each 5     glipiZIDE (GLUCOTROL) 5 MG tablet Take 1 tablet (5 mg) by mouth 2 times daily (before meals) 180 tablet 3     glucosamine-chondroitin 500-400 MG tablet Take 1 tablet by mouth daily 90 tablet 3     ipratropium - albuterol 0.5 mg/2.5 mg/3 mL (DUONEB) 0.5-2.5 (3) MG/3ML neb solution Take 1 vial (3 mLs) by nebulization every 6 hours as needed for shortness of breath / dyspnea or wheezing 1080 mL 0     ketotifen (ZADITOR) 0.025 % ophthalmic solution Place 1 drop into both eyes daily       Lancet Devices (EASY MINI EJECT LANCING DEVICE) MISC        multivitamin w/minerals (THERA-VIT-M) tablet Take 1 tablet by mouth daily 30 tablet 11     omega-3 acid ethyl esters (LOVAZA) 1 g capsule Take 2 capsules (2 g) by mouth every morning AND 2 capsules (2 g) every evening. 360 capsule 2     Omega-3-Acid Eth Est, Dietary, 1 g CAPS Take 2 capfuls by mouth 2 times daily 360 capsule 1     omeprazole (PRILOSEC) 20 MG DR capsule Take 1 capsule (20 mg) by mouth 2 times daily  180 capsule 3     omeprazole (PRILOSEC) 20 MG DR capsule Take 1 capsule (20 mg) by mouth 2 times daily 180 capsule 3     polyethylene glycol-propylene glycol (SYSTANE) 0.4-0.3 % SOLN ophthalmic solution Place 1 drop into both eyes 4 times daily 5 mL 11     sodium chloride (NEBUSAL) 3 % neb solution Take 3 mLs by nebulization daily Use 1-2 times daily in combination with Duoneb and Aerobika to help with mucus clearance 250 mL 3     ALLERGIES  Allergies   Allergen Reactions     Interferons Dermatitis     Penicillins Hives     Aspirin      325mg      Colon Care       REVIEW OF SYSTEMS  As above in the HPI, o/w complete 12-point ROS was negative.    PHYSICAL EXAM  /71   Pulse 94   Temp 98.8  F (37.1  C) (Oral)   Resp 16   Wt 93.8 kg (206 lb 11.2 oz)   SpO2 98%   BMI 33.36 kg/m    GEN: NAD  HEENT: EOMI, no icterus, injection or pallor  NEURO: alert    LABORATORY AND IMAGING STUDIES   08/31/22 16:09   Sodium 142   Potassium 3.7   Chloride 99   Carbon Dioxide (CO2) 29   Urea Nitrogen 6.5 (L)   Creatinine 0.46 (L)   GFR Estimate >90   Calcium 9.7   Anion Gap 14   Albumin 4.4   Protein Total 7.4   Alkaline Phosphatase 109 (H)   ALT 9 (L)   AST 17   Bilirubin Total 0.4   Glucose 184 (H)   WBC 7.2   Hemoglobin 14.7   Hematocrit 46.7   Platelet Count 205   RBC Count 5.07   MCV 92   MCH 29.0   MCHC 31.5   RDW 13.5   % Neutrophils 57   % Lymphocytes 33   % Monocytes 7   % Eosinophils 3   % Basophils 0   Absolute Basophils 0.0   Absolute Eosinophils 0.2   Absolute Immature Granulocytes 0.0   Absolute Lymphocytes 2.4   Absolute Monocytes 0.5   % Immature Granulocytes 0   Absolute Neutrophils 4.1   Absolute NRBCs 0.0   NRBCs per 100 WBC 0     Labs were independently reviewed and interpreted by me    Imaging was personally reviewed and interpreted by me    4:98      4:118

## 2022-08-31 NOTE — NURSING NOTE
"Oncology Rooming Note    August 31, 2022 2:11 PM   Jenn Aparicio is a 67 year old female who presents for:    Chief Complaint   Patient presents with     Oncology Clinic Visit     Pt is here for a rtn for Lung Cancer     Initial Vitals: Blood Pressure 135/71   Pulse 94   Temperature 98.8  F (37.1  C) (Oral)   Respiration 16   Weight 93.8 kg (206 lb 11.2 oz)   Oxygen Saturation 98%   Body Mass Index 33.36 kg/m   Estimated body mass index is 33.36 kg/m  as calculated from the following:    Height as of 8/16/22: 1.676 m (5' 6\").    Weight as of this encounter: 93.8 kg (206 lb 11.2 oz). Body surface area is 2.09 meters squared.  No Pain (0) Comment: Data Unavailable   No LMP recorded. Patient is postmenopausal.  Allergies reviewed: Yes  Medications reviewed: Yes    Medications: Medication refills not needed today.  Pharmacy name entered into [employer]:    [id number] - [place], [place] - [facility] [address] [place] [address] & [facility] (NOW [facility]) - [facility] [address] [place] [address]  [facility] #[id number] - [facility] [address] [place] [address] - [facility] [address] [facility] - [place], [place] - [facility] [facility] - [place], [place] - [address]   [facility] - [facility], [place] - [facility] Merged with [facility]    Clinical concerns: none       Elisabeth Collins MA            "

## 2022-09-01 LAB
TSH SERPL DL<=0.005 MIU/L-ACNC: 2.65 UIU/ML (ref 0.3–4.2)
VIT B12 SERPL-MCNC: 236 PG/ML (ref 232–1245)

## 2022-09-12 DIAGNOSIS — E53.8 LOW SERUM VITAMIN B12: ICD-10-CM

## 2022-09-12 DIAGNOSIS — G62.89 OTHER POLYNEUROPATHY: Primary | ICD-10-CM

## 2022-09-12 DIAGNOSIS — D64.9 ANEMIA, UNSPECIFIED TYPE: ICD-10-CM

## 2022-09-13 ENCOUNTER — PATIENT OUTREACH (OUTPATIENT)
Dept: ONCOLOGY | Facility: CLINIC | Age: 67
End: 2022-09-13

## 2022-09-13 NOTE — PROGRESS NOTES
[facility]: Cancer Care                                                                                          Placed call to patient to review B12 came back low. Dr Leung will be adding additional labs, including iron studies to be drawn on 9/22, when here for echo. Also discussed her A1C is high and recommended she follow-up with PCP.     We discussed low B12 can cause worsening neuropathy. Reviewed upcoming appointments. Patient voiced understanding and had no additional questions or concerns at this time.       Bernadette Garcias RN, BSN, OCN   RN Care Coordinator   [facility]

## 2022-09-22 ENCOUNTER — DOCUMENTATION ONLY (OUTPATIENT)
Dept: INTERNAL MEDICINE | Facility: CLINIC | Age: 67
End: 2022-09-22

## 2022-09-22 ENCOUNTER — LAB (OUTPATIENT)
Dept: LAB | Facility: CLINIC | Age: 67
End: 2022-09-22
Attending: INTERNAL MEDICINE
Payer: COMMERCIAL

## 2022-09-22 DIAGNOSIS — C34.91 ADENOCARCINOMA, LUNG, RIGHT (H): ICD-10-CM

## 2022-09-22 DIAGNOSIS — E53.8 LOW SERUM VITAMIN B12: ICD-10-CM

## 2022-09-22 DIAGNOSIS — D64.9 ANEMIA, UNSPECIFIED TYPE: ICD-10-CM

## 2022-09-22 LAB
ALBUMIN SERPL BCG-MCNC: 4.1 G/DL (ref 3.5–5.2)
ALP SERPL-CCNC: 110 U/L (ref 35–104)
ALT SERPL W P-5'-P-CCNC: 9 U/L (ref 10–35)
ANION GAP SERPL CALCULATED.3IONS-SCNC: 11 MMOL/L (ref 7–15)
AST SERPL W P-5'-P-CCNC: 16 U/L (ref 10–35)
BASOPHILS # BLD AUTO: 0.1 10E3/UL (ref 0–0.2)
BASOPHILS NFR BLD AUTO: 1 %
BILIRUB SERPL-MCNC: 0.2 MG/DL
BUN SERPL-MCNC: 8 MG/DL (ref 8–23)
CALCIUM SERPL-MCNC: 9.6 MG/DL (ref 8.8–10.2)
CHLORIDE SERPL-SCNC: 101 MMOL/L (ref 98–107)
CREAT SERPL-MCNC: 0.47 MG/DL (ref 0.51–0.95)
DEPRECATED HCO3 PLAS-SCNC: 30 MMOL/L (ref 22–29)
EOSINOPHIL # BLD AUTO: 0 10E3/UL (ref 0–0.7)
EOSINOPHIL NFR BLD AUTO: 1 %
ERYTHROCYTE [DISTWIDTH] IN BLOOD BY AUTOMATED COUNT: 13.2 % (ref 10–15)
FERRITIN SERPL-MCNC: 94 NG/ML (ref 11–328)
GFR SERPL CREATININE-BSD FRML MDRD: >90 ML/MIN/1.73M2
GLUCOSE SERPL-MCNC: 183 MG/DL (ref 70–99)
HCT VFR BLD AUTO: 42.9 % (ref 35–47)
HGB BLD-MCNC: 13.7 G/DL (ref 11.7–15.7)
IMM GRANULOCYTES # BLD: 0 10E3/UL
IMM GRANULOCYTES NFR BLD: 0 %
IRON BINDING CAPACITY (ROCHE): 304 UG/DL (ref 240–430)
IRON SATN MFR SERPL: 35 % (ref 15–46)
IRON SERPL-MCNC: 106 UG/DL (ref 37–145)
LYMPHOCYTES # BLD AUTO: 2.8 10E3/UL (ref 0.8–5.3)
LYMPHOCYTES NFR BLD AUTO: 36 %
MCH RBC QN AUTO: 28.4 PG (ref 26.5–33)
MCHC RBC AUTO-ENTMCNC: 31.9 G/DL (ref 31.5–36.5)
MCV RBC AUTO: 89 FL (ref 78–100)
MONOCYTES # BLD AUTO: 0.7 10E3/UL (ref 0–1.3)
MONOCYTES NFR BLD AUTO: 8 %
NEUTROPHILS # BLD AUTO: 4.3 10E3/UL (ref 1.6–8.3)
NEUTROPHILS NFR BLD AUTO: 54 %
NRBC # BLD AUTO: 0 10E3/UL
NRBC BLD AUTO-RTO: 0 /100
PLATELET # BLD AUTO: 250 10E3/UL (ref 150–450)
POTASSIUM SERPL-SCNC: 3.4 MMOL/L (ref 3.4–5.3)
PROT SERPL-MCNC: 7 G/DL (ref 6.4–8.3)
RBC # BLD AUTO: 4.82 10E6/UL (ref 3.8–5.2)
SODIUM SERPL-SCNC: 142 MMOL/L (ref 136–145)
WBC # BLD AUTO: 7.9 10E3/UL (ref 4–11)

## 2022-09-22 PROCEDURE — 83516 IMMUNOASSAY NONANTIBODY: CPT

## 2022-09-22 PROCEDURE — 83550 IRON BINDING TEST: CPT

## 2022-09-22 PROCEDURE — 82728 ASSAY OF FERRITIN: CPT

## 2022-09-22 PROCEDURE — 85025 COMPLETE CBC W/AUTO DIFF WBC: CPT

## 2022-09-22 PROCEDURE — 83921 ORGANIC ACID SINGLE QUANT: CPT

## 2022-09-22 PROCEDURE — 80053 COMPREHEN METABOLIC PANEL: CPT

## 2022-09-22 PROCEDURE — 36415 COLL VENOUS BLD VENIPUNCTURE: CPT

## 2022-09-22 NOTE — PROGRESS NOTES
Type of Form Received: [facility] [employer]    Form Received (Date) 9/22/22   Form Filled out Yes 9/29/22   Placed in provider folder Yes

## 2022-09-22 NOTE — NURSING NOTE
Chief Complaint   Patient presents with     Blood Draw     Labs collected from venipuncture by JOHANN.       Emerald HUMPHREYS RN PHN BSN  BMT/Oncology Lab

## 2022-09-24 LAB — PCA IGG SER-ACNC: 1.3 UNITS

## 2022-09-27 ENCOUNTER — MEDICAL CORRESPONDENCE (OUTPATIENT)
Dept: HEALTH INFORMATION MANAGEMENT | Facility: CLINIC | Age: 67
End: 2022-09-27

## 2022-09-27 LAB — METHYLMALONATE SERPL-SCNC: <0.1 UMOL/L (ref 0–0.4)

## 2022-10-05 ENCOUNTER — PATIENT OUTREACH (OUTPATIENT)
Dept: ONCOLOGY | Facility: CLINIC | Age: 67
End: 2022-10-05

## 2022-10-05 ENCOUNTER — HOSPITAL ENCOUNTER (OUTPATIENT)
Dept: PET IMAGING | Facility: CLINIC | Age: 67
Discharge: HOME | End: 2022-10-05
Attending: INTERNAL MEDICINE | Admitting: INTERNAL MEDICINE
Payer: COMMERCIAL

## 2022-10-05 DIAGNOSIS — C34.31 MALIGNANT NEOPLASM OF LOWER LOBE OF RIGHT LUNG (H): ICD-10-CM

## 2022-10-05 PROCEDURE — 343N000001 HC RX 343: Performed by: INTERNAL MEDICINE

## 2022-10-05 PROCEDURE — 71260 CT THORAX DX C+: CPT | Mod: 26

## 2022-10-05 PROCEDURE — 78816 PET IMAGE W/CT FULL BODY: CPT | Mod: PS

## 2022-10-05 PROCEDURE — 250N000011 HC RX IP 250 OP 636: Performed by: INTERNAL MEDICINE

## 2022-10-05 PROCEDURE — 74177 CT ABD & PELVIS W/CONTRAST: CPT

## 2022-10-05 PROCEDURE — A9552 F18 FDG: HCPCS | Performed by: INTERNAL MEDICINE

## 2022-10-05 PROCEDURE — 78816 PET IMAGE W/CT FULL BODY: CPT | Mod: 26

## 2022-10-05 PROCEDURE — 74177 CT ABD & PELVIS W/CONTRAST: CPT | Mod: 26

## 2022-10-05 RX ORDER — IOPAMIDOL 755 MG/ML
0-135 INJECTION, SOLUTION INTRAVASCULAR ONCE
Status: COMPLETED | OUTPATIENT
Start: 2022-10-05 | End: 2022-10-05

## 2022-10-05 RX ORDER — IOPAMIDOL 755 MG/ML
0-135 INJECTION, SOLUTION INTRAVASCULAR ONCE
Status: DISCONTINUED | OUTPATIENT
Start: 2022-10-05 | End: 2022-10-05 | Stop reason: CLARIF

## 2022-10-05 RX ADMIN — IOPAMIDOL 113 ML: 755 INJECTION, SOLUTION INTRAVENOUS at 08:10

## 2022-10-05 RX ADMIN — FLUDEOXYGLUCOSE F-18 12.28 MCI.: 500 INJECTION, SOLUTION INTRAVENOUS at 07:12

## 2022-10-05 NOTE — PROGRESS NOTES
[facility]/Voicemail    Clinical Data: Care Coordinator Outreach    Outreach attempted x 1.  Left message on patient's voicemail with call back information and requested return call.    Plan: Care Coordinator will try to reach patient again today.    Calling patient to let her know that we need to change her appointment with Dr. Leung to a virtual/phone visit instead of in-person as Dr. Leung is out of the clinic this week.    ADDENDUM: 10:28 AM  Our Clinic Coordinator was able to connect with patient and update appointment.    Rodrigo Armstrong, DNP, RN, OCN  RN Care Coordinator  [facility]

## 2022-10-07 ENCOUNTER — VIRTUAL VISIT (OUTPATIENT)
Dept: ONCOLOGY | Facility: CLINIC | Age: 67
End: 2022-10-07
Attending: INTERNAL MEDICINE
Payer: COMMERCIAL

## 2022-10-07 DIAGNOSIS — C34.31 MALIGNANT NEOPLASM OF LOWER LOBE OF RIGHT LUNG (H): Primary | ICD-10-CM

## 2022-10-07 PROCEDURE — 99443 PR PHYSICIAN TELEPHONE EVALUATION 21-30 MIN: CPT | Mod: 95 | Performed by: INTERNAL MEDICINE

## 2022-10-07 RX ORDER — PREDNISONE 20 MG/1
TABLET ORAL
Status: ON HOLD | COMMUNITY
Start: 2022-09-23 | End: 2023-01-02

## 2022-10-07 NOTE — PROGRESS NOTES
Jenn is a 67 year old who is being evaluated via a billable telephone visit.      Pt has pain in chest, burning sensation in hands and feet, and pain in lower back.     What phone number would you like to be contacted at? [phone]  How would you like to obtain your AVS? Mail a copy  Phone call duration: *** minutes    Nancy Quijano VF

## 2022-10-07 NOTE — LETTER
10/7/2022         RE: Jenn Aparicio  [address]  [facility] [address]        Dear Colleague,    Thank you for referring your patient, Jenn Aparicio, to the [facility]. Please see a copy of my visit note below.       [facility]    PATIENT NAME: Jenn Aparicio  MRN # [id number]   DATE OF VISIT: October 7, 2022  YOB: 1955     CANCER TYPE: Lung adenocarcinoma  STAGE: IA2 [id number] poorly diff adeno RLL, then [id number] IA2 suspected NSCLC RUL, medically inoperable     ECOG PS: 1    PD-L1: N/A  Lung panel: N/A  NGS: N/A    SUMMARY  12/24/15 PET/CT  1/13/15 CT lung bx. Adenocarcinoma  2/8/16 R thoracotomy, RLL lobectomy (Dr. Cunha). Adenocarcinoma, 1.1 cm, assoicated with atypical adenomatous hyperplasia  10/18/19 CTA for shortness of breath. New 1.3 cm RUL nodule  11/2019 PET/CT. 1.1 cm RUL hypermetabolic nodule (SUV 12.6)  12/26/19 Brain MRI negative for mets  1/14~1/28/20 SBRT to RUL nodule, 5000 cGy, every other day, 1000 cGy (Dr. Currie at [facility] [place]). No bx due to O2 dependence and too much risk  8/19/20 First visit with me   11/29/21 CT chest. New 10 mm RUL nodule  1/11/22 CT chest. New 7.2 mm RUL nodule, unchanged RUL nodules  3/31/22 CT chest. New RUL nodule from Jan 2022 7-->10 mm, new 5 mm ROBERT nodule  5/20~5/24/22 [facility] for COPD exacerbation.   6/24/22 CT chest non contrast.   8/16/22 EGD. 3 cm hiatal hernia, o/w normal  8/31/22 CT chest. 2.5 x 1.3 cm R midlung subpleural nodularity (4:98) previously 2.3 x 1.1 cm, slightly increased solid component   10/5/22 PET/CT. 2 x 1.3 cm irregular opacity posterior RUL (SUV 2.46), 2.4 x 0.8 cm linear opacity (SUV 4.76); there was bronchiectasia on prior imaging. Stable 1.1 x 0.8 cm non FDG avid RUL opacity and unchanged 4 mm posterior RUL nodule. No adenopathy. Inferior right breast uptake (SUV 6.09) likely infectious or inflammatory.     ASSESSMENT AND PLAN   NSCLC, adeno, RUL: Asked her to have a PET/CT after the  last CT showed an increasing solid component in the R lung and another area a little higher up that looked a little worse. Discussed I'm a little more worried about the posterior RUL linear opacity although there was bronchiectasis on prior imaging there. I'm not quite as worried about the GGO with the increasing solid component posteriorly but it could still be something like hyperplasia or lepidic adeno. Biopsying either of these areas would not be feasible due to the underlying COPD and I'm not sure they're accessible endobronchially. I'd like to discuss at tumor board on Tues. I'll let Jenn know the outcome. We discussed that SBRT could be an option if the suspicion was high enough for a cancer without a bx but that of course, it would be very highly desirable to get a tissue diagnosis first. Overall, I favor ongoing monitoring. Will schedule next follow up based on tumor board discussion outcome.     Peripheral neuropathy: Worse than before, A1c 8/31 was almost 10, hasn't had a chance to talk to her Endocrine yet, appointment not scheduled until late Jan - see below. B12 was also a little low but MMA was normal, so unlikely to be significant B12 deficiency.    Candidiasis: Will send script for miconazole powder. She's been using corn starch    DM2: Between PCPs so I referred to Endocrinology from our last visit 8/31, scheduled in late Jan, will message to get that moved up.     COPD: TTE 9/22/22 pretty unremarkable.     30 minutes spent on this phone call.     Zarina Leung MD  Associate Professor of Medicine  Hematology, Oncology and Transplantation      SUBJECTIVE  Jenn returns today for follow up of h/o lung cancer. We've been watching a RUL nodule and then new ROBERT nodules that showed up in March, stable on the 6/24/22 CT chest non contrast.     Was on antibiotics and prednisone about a week ago for COPD exacerbation  Still achy, neuropathy still unchanged since last visit but worse than before - see  above and prior note  Moving at the end of the month  Hasn't talked to anyone about the DM.  Had a stressful morning because her ride was very late, she had to get up very very early, and ultimately, the person giving the ride didn't want to bring her in because it was so late so asked her to get out of the van. Also been stressed with the PET/CT and worry about her health.     PAST MEDICAL HISTORY  Lung adenocarcinoma  DM2 with neuropathy  HCV IA, undetectable in 2019, treated  COPD. O2 dependent since late 2018. PFT 11/26/19. FEV1 0.64 (30%), FVC 1.69 (63%), DLCOunc 9.8 (38%).  HTN  H/o ITP  H/o disk herniation  Ankle surgery  Median neuropathy R  H/o sessile colon polyps 2014, h/o adenomas before that. Colonoscopy 2/7/18 @ [facility] [place]. Sessile serated adenoma x 1, tubular adenoma x 3  H/o chronic LBP  Dyslipidemia  Cataracts    CURRENT OUTPATIENT MEDICATIONS  Current Outpatient Medications   Medication Sig Dispense Refill     albuterol (PROAIR HFA/PROVENTIL HFA/VENTOLIN HFA) 108 (90 Base) MCG/ACT inhaler INHALE 1 OR 2 PUFFS BY MOUTH EVERY 4 HOURS AS NEEDED 18 g 3     alcohol swab prep pads Use to swab area of injection/xander as directed. 100 each 1     Alpha-Lipoic Acid 300 MG CAPS Take 300 mg by mouth daily 90 capsule 3     amLODIPine (NORVASC) 10 MG tablet Take 1 tablet (10 mg) by mouth daily 90 tablet 3     aspirin 81 MG EC tablet Take 1 tablet (81 mg) by mouth daily 90 tablet 3     benzonatate (TESSALON) 200 MG capsule Take 1 capsule (200 mg) by mouth 3 times daily as needed for cough 100 capsule 3     cetirizine (ZYRTEC) 10 MG tablet Take 1 tablet (10 mg) by mouth daily 30 tablet 10     coenzyme Q-10 (CO-Q10) 50 MG capsule Take 1 capsule (50 mg) by mouth daily 90 capsule 3     Continuous Blood Gluc Sensor (FREESTYLE GIOVANNI 14 DAY SENSOR) MISC Change every 14 days. 2 each 11     cyclobenzaprine (FLEXERIL) 5 MG tablet Take 1 tablet (5 mg) by mouth 3 times daily as needed for muscle spasms 30 tablet 0     doxycycline  hyclate (VIBRA-TABS) 100 MG tablet Take 1 tablet (100 mg) by mouth 2 times daily 5 tablet 3     Easy Comfort Lancets MISC        fluticasone (FLONASE) 50 MCG/ACT nasal spray Spray 1 spray into both nostrils daily Use at night before bed 15.8 mL 1     Fluticasone-Umeclidin-Vilanterol (TRELEGY ELLIPTA) 200-62.5-25 MCG/INH oral inhaler Inhale 1 puff into the lungs daily 28 each 5     glipiZIDE (GLUCOTROL) 5 MG tablet Take 1 tablet (5 mg) by mouth 2 times daily (before meals) 180 tablet 3     glucosamine-chondroitin 500-400 MG tablet Take 1 tablet by mouth daily 90 tablet 3     ipratropium - albuterol 0.5 mg/2.5 mg/3 mL (DUONEB) 0.5-2.5 (3) MG/3ML neb solution Take 1 vial (3 mLs) by nebulization every 6 hours as needed for shortness of breath / dyspnea or wheezing 1080 mL 0     ketotifen (ZADITOR) 0.025 % ophthalmic solution Place 1 drop into both eyes daily       Lancet Devices (EASY MINI EJECT LANCING DEVICE) MISC        multivitamin w/minerals (THERA-VIT-M) tablet Take 1 tablet by mouth daily 30 tablet 11     omega-3 acid ethyl esters (LOVAZA) 1 g capsule Take 2 capsules (2 g) by mouth every morning AND 2 capsules (2 g) every evening. 360 capsule 2     omeprazole (PRILOSEC) 20 MG DR capsule Take 1 capsule (20 mg) by mouth 2 times daily 180 capsule 3     polyethylene glycol-propylene glycol (SYSTANE) 0.4-0.3 % SOLN ophthalmic solution Place 1 drop into both eyes 4 times daily 5 mL 11     predniSONE (DELTASONE) 20 MG tablet        sodium chloride (NEBUSAL) 3 % neb solution Take 3 mLs by nebulization daily Use 1-2 times daily in combination with Duoneb and Aerobika to help with mucus clearance 250 mL 3     STATIN NOT PRESCRIBED (INTENTIONAL) Please choose reason not prescribed from choices below.       Omega-3-Acid Eth Est, Dietary, 1 g CAPS Take 2 capfuls by mouth 2 times daily (Patient not taking: Reported on 10/7/2022) 360 capsule 1     omeprazole (PRILOSEC) 20 MG DR capsule Take 1 capsule (20 mg) by mouth 2 times  daily (Patient not taking: Reported on 10/7/2022) 180 capsule 3     ALLERGIES  Allergies   Allergen Reactions     Interferons Dermatitis     Penicillins Hives     Aspirin      325mg      Colon Care       REVIEW OF SYSTEMS  As above in the HPI, o/w complete 12-point ROS was negative.    PHYSICAL EXAM  There were no vitals taken for this visit.  GEN: NAD, voice sounds normal    Remainder of physical exam deferred due to public health emergency and limitations of phone visit.    LABORATORY AND IMAGING STUDIES   09/22/22 09:42 09/22/22 09:43   Sodium  142   Potassium  3.4   Chloride  101   Carbon Dioxide (CO2)  30 (H)   Urea Nitrogen  8.0   Creatinine  0.47 (L)   GFR Estimate  >90   Calcium  9.6   Anion Gap  11   Albumin  4.1   Protein Total  7.0   Alkaline Phosphatase  110 (H)   ALT  9 (L)   AST  16   Bilirubin Total  0.2   Ferritin 94    Glucose  183 (H)   Iron 106    Iron Binding Capacity 304    Iron Sat Index 35    Methylmalonic Acid <0.10    Parietal Cell Antibody IgG 1.3      Labs were independently reviewed and interpreted by me    PET Oncology Whole Body  Narrative: Combined Report of: PET and CT on 10/5/2022 8:39 AM:    1. PET of the neck, chest, abdomen, and pelvis.  2. PET CT Fusion for Attenuation Correction and Anatomical  Localization.  3. Diagnostic CT of the chest, abdomen and pelvis with intravenous  contrast obtained for diagnostic interpretation.  4. 3D MIP and PET-CT fused images were processed on an independent  workstation and archived to PACS and reviewed by a radiologist.    Technique:    1. PET: The patient received 12.28 mCi of F-18-FDG. The serum glucose  was 244 prior to administration. Body weight was 93.8 kg. Images were  evaluated in the axial, sagittal, and coronal planes as well as the  rotational whole body MIP. Images were acquired from the cranial  vertex to the feet.    UPTAKE WAS MEASURED AT 60 MINUTES.     BACKGROUND: Liver SUV max = 3.98, Aorta Blood SUV Max = 3.3.     2. CT:  Volumetric acquisition for clinical interpretation of the  chest, abdomen, and pelvis acquired at 3 mm sections. The chest,  abdomen, and pelvis were evaluated at 5 mm sections in bone, soft  tissue, and lung windows.    Contrast and Medications:  IV contrast: 113 mL of Isovue 370 intravenously.  PO contrast: 500 ml Breeza.  Additional Medications: None.    3. 3D MIP and PET-CT fused images were processed on an independent  workstation and archived to PACS and reviewed by a radiologist.    INDICATION: H/o lung adenocarcinoma, new R GGO with increasing solid  component and another area in the right lung posteriorly that has an  increasing solid component. Evaluate for FDG avidity to suggest  recurrent lung cancer; Malignant neoplasm of lower lobe of right lung  (H).    ADDITIONAL INFORMATION OBTAINED FROM EMR: 2/8/16: R thoracotomy, RLL  lobectomy. Medically inoperable stage IA2 (cT1b N0 M0) likely NSCLC of  RUL.  1/14~1/28/20: SBRT to RUL nodule. No bx due to O2 dependence and too  much risk.    COMPARISON: Chest CT from 8/31/2022, 5/4/2022, and 11/29/2021.    FINDINGS:     HEAD/NECK:  There is no suspicious FDG uptake in the neck.    Mucosal thickening of the left maxillary sinus. The mastoid air cells  are clear.     No hypermetabolic lymph nodes are demonstrated in the neck.     The mucosal and deep spaces of the neck are unremarkable. The major  salivary glands are unremarkable. The thyroid is unremarkable. The  major vasculature of the neck are patent.    CHEST:  Right lower lobectomy.    Irregular opacity within the posterior right upper lobe (series 7  image 43), measuring 2 x 1.3 cm with SUV max 2.46, previously 2.3 x  1.3 cm on 8/31/2022. This opacity changing in nature since 5/4/2022,  which was solitary pulmonary nodule, now irregular semisolid opacity.    Mildly hypermetabolic linear opacity (series 7 image 46) extending  from the subsegmental right upper lobe bronchi to the peripheral lung,  measuring  2.4 x 0.8 cm with SUV max 4.76;  there was bronchiectasia on  prior imaging.    Stable right upper lobe irregular opacity (series 7 image 21),  measuring 1.1 x 0.8 cm, not FDG avid. Unchanged non-FDG avid 4 mm  pulmonary nodule posterior right upper lobe (image 29).    Stable left upper lobe posterior clustered nodularity. Possible right  upper lobe bronchiectasis (series 7 image 49), similar to prior study.    Extensive emphysematous changes. No pleural effusion or pneumothorax.     No hypermetabolic lymph nodes are demonstrated in the chest. Focus of  FDG uptake within the continuous inferior right breast with SUV max  6.09, likely represents infectious/inflammatory process    Heart size is within normal limits. No pericardial effusion. The  thoracic aorta and main pulmonary artery are within normal limits for  diameter. The esophagus is unremarkable.     ABDOMEN AND PELVIS:  There is no suspicious FDG uptake in the abdomen or pelvis.    The liver is unremarkable. The gallbladder is unremarkable. No  intrahepatic or extrahepatic biliary ductal dilatation. The pancreas  is unremarkable. No pancreatic ductal dilatation. The spleen is  unremarkable. The adrenal glands are unremarkable.    Symmetric enhancement of the kidneys. No hydronephrosis. The urinary  bladder is unremarkable. The reproductive organs are unremarkable.    No hypermetabolic lymph nodes are demonstrated in the abdomen or  pelvis.    Normal caliber of the small and large bowel. Colon diverticulosis  without diverticulitis. No free air, free fluid, or fluid collection.  Normal caliber of the abdominal aorta.    LOWER EXTREMITIES:   There is no suspicious FDG uptake in the visualized lower extremities.    BONES:   There is no suspicious FDG uptake in the skeleton. There are no  suspicious lytic or blastic osseous lesions.   Impression: IMPRESSION: In this patient with right lung cancer status post right  lobectomy and radiation to the right upper lobe  nodule:    1. Right upper lobe irregular semi-solid opacity is non-FDG avid,  unchanged in size compared to prior study, there has been changing in  nature, previously 6 mm solid pulmonary nodule. Absence of metabolism  does not exclude the underlying malignancy. Attention on follow-up.    2. Mildly hypermetabolic linear opacity extending from the  subsegmental right upper lobe bronchi to the peripheral lung,  previously there was bronchiectasia, may represent mucous plug and  infectious process given the presence of metabolism. Attention on  follow-up.    3.Other pulmonary findings, including right upper lobe 1.1 cm  irregular opacity and pulmonary nodule/ clustered nodularities as  described above in detail are stable.    4. No evidence to suggest metastatic disease.    5. Focus of FDG uptake within the continuous inferior right breast  with SUV max 6.09, likely represents infectious/inflammatory process.  Direct visualization is recommended.    I have personally reviewed the examination and initial interpretation  and I agree with the findings.    JAMIE SANCHEZ MD         SYSTEM ID:  [id number]  CT Chest/Abdomen/Pelvis w Contrast  Narrative: Combined Report of: PET and CT on 10/5/2022 8:39 AM:    1. PET of the neck, chest, abdomen, and pelvis.  2. PET CT Fusion for Attenuation Correction and Anatomical  Localization.  3. Diagnostic CT of the chest, abdomen and pelvis with intravenous  contrast obtained for diagnostic interpretation.  4. 3D MIP and PET-CT fused images were processed on an independent  workstation and archived to PACS and reviewed by a radiologist.    Technique:    1. PET: The patient received 12.28 mCi of F-18-FDG. The serum glucose  was 244 prior to administration. Body weight was 93.8 kg. Images were  evaluated in the axial, sagittal, and coronal planes as well as the  rotational whole body MIP. Images were acquired from the cranial  vertex to the feet.    UPTAKE WAS MEASURED AT 60 MINUTES.      BACKGROUND: Liver SUV max = 3.98, Aorta Blood SUV Max = 3.3.     2. CT: Volumetric acquisition for clinical interpretation of the  chest, abdomen, and pelvis acquired at 3 mm sections. The chest,  abdomen, and pelvis were evaluated at 5 mm sections in bone, soft  tissue, and lung windows.    Contrast and Medications:  IV contrast: 113 mL of Isovue 370 intravenously.  PO contrast: 500 ml Breeza.  Additional Medications: None.    3. 3D MIP and PET-CT fused images were processed on an independent  workstation and archived to PACS and reviewed by a radiologist.    INDICATION: H/o lung adenocarcinoma, new R GGO with increasing solid  component and another area in the right lung posteriorly that has an  increasing solid component. Evaluate for FDG avidity to suggest  recurrent lung cancer; Malignant neoplasm of lower lobe of right lung  (H).    ADDITIONAL INFORMATION OBTAINED FROM EMR: 2/8/16: R thoracotomy, RLL  lobectomy. Medically inoperable stage IA2 (cT1b N0 M0) likely NSCLC of  RUL.  1/14~1/28/20: SBRT to RUL nodule. No bx due to O2 dependence and too  much risk.    COMPARISON: Chest CT from 8/31/2022, 5/4/2022, and 11/29/2021.    FINDINGS:     HEAD/NECK:  There is no suspicious FDG uptake in the neck.    Mucosal thickening of the left maxillary sinus. The mastoid air cells  are clear.     No hypermetabolic lymph nodes are demonstrated in the neck.     The mucosal and deep spaces of the neck are unremarkable. The major  salivary glands are unremarkable. The thyroid is unremarkable. The  major vasculature of the neck are patent.    CHEST:  Right lower lobectomy.    Irregular opacity within the posterior right upper lobe (series 7  image 43), measuring 2 x 1.3 cm with SUV max 2.46, previously 2.3 x  1.3 cm on 8/31/2022. This opacity changing in nature since 5/4/2022,  which was solitary pulmonary nodule, now irregular semisolid opacity.    Mildly hypermetabolic linear opacity (series 7 image 46) extending  from  the subsegmental right upper lobe bronchi to the peripheral lung,  measuring 2.4 x 0.8 cm with SUV max 4.76;  there was bronchiectasia on  prior imaging.    Stable right upper lobe irregular opacity (series 7 image 21),  measuring 1.1 x 0.8 cm, not FDG avid. Unchanged non-FDG avid 4 mm  pulmonary nodule posterior right upper lobe (image 29).    Stable left upper lobe posterior clustered nodularity. Possible right  upper lobe bronchiectasis (series 7 image 49), similar to prior study.    Extensive emphysematous changes. No pleural effusion or pneumothorax.     No hypermetabolic lymph nodes are demonstrated in the chest. Focus of  FDG uptake within the continuous inferior right breast with SUV max  6.09, likely represents infectious/inflammatory process    Heart size is within normal limits. No pericardial effusion. The  thoracic aorta and main pulmonary artery are within normal limits for  diameter. The esophagus is unremarkable.     ABDOMEN AND PELVIS:  There is no suspicious FDG uptake in the abdomen or pelvis.    The liver is unremarkable. The gallbladder is unremarkable. No  intrahepatic or extrahepatic biliary ductal dilatation. The pancreas  is unremarkable. No pancreatic ductal dilatation. The spleen is  unremarkable. The adrenal glands are unremarkable.    Symmetric enhancement of the kidneys. No hydronephrosis. The urinary  bladder is unremarkable. The reproductive organs are unremarkable.    No hypermetabolic lymph nodes are demonstrated in the abdomen or  pelvis.    Normal caliber of the small and large bowel. Colon diverticulosis  without diverticulitis. No free air, free fluid, or fluid collection.  Normal caliber of the abdominal aorta.    LOWER EXTREMITIES:   There is no suspicious FDG uptake in the visualized lower extremities.    BONES:   There is no suspicious FDG uptake in the skeleton. There are no  suspicious lytic or blastic osseous lesions.   Impression: IMPRESSION: In this patient with right  lung cancer status post right  lobectomy and radiation to the right upper lobe nodule:    1. Right upper lobe irregular semi-solid opacity is non-FDG avid,  unchanged in size compared to prior study, there has been changing in  nature, previously 6 mm solid pulmonary nodule. Absence of metabolism  does not exclude the underlying malignancy. Attention on follow-up.    2. Mildly hypermetabolic linear opacity extending from the  subsegmental right upper lobe bronchi to the peripheral lung,  previously there was bronchiectasia, may represent mucous plug and  infectious process given the presence of metabolism. Attention on  follow-up.    3.Other pulmonary findings, including right upper lobe 1.1 cm  irregular opacity and pulmonary nodule/ clustered nodularities as  described above in detail are stable.    4. No evidence to suggest metastatic disease.    5. Focus of FDG uptake within the continuous inferior right breast  with SUV max 6.09, likely represents infectious/inflammatory process.  Direct visualization is recommended.    I have personally reviewed the examination and initial interpretation  and I agree with the findings.    JAMIE SANCHEZ MD         SYSTEM ID:  [id number]     Imaging was personally reviewed and interpreted by me and compared to prior CT chests          Sincerely,    Zarina Leung MD

## 2022-10-07 NOTE — PROGRESS NOTES
Thoracic Tumor Conference    Patient Name: Jenn Aparicio    Reason for conference discussion (brief overview): 68 yo female with h/o lung adenocarcinoma, initially [id number] (IA2) RLL in 2015, s/p R thoracotomy, RLL lobectomy (Dr. Cunha). Path showed 1.1 cm adenocarcinoma with associated atypical adenomatous hyperplasia. CT 10/2019 showed new 1.3 cm RUL nodule. No bx due to underlying severe COPD/risk. Treated with SBRT 1/14~1/28/2020, 5000 [place] [facility] [place] Transferred here thereafter.     Have been watching two areas on the R during the course of 2022. Increasing solid component in both. PET/CT 10/5/22 showed 2 x 1.3 cm irregular opacity posterior RUL (SUV 2.46), 2.4 x 0.8 cm linear opacity (SUV 4.76); there was bronchiectasia on prior imaging. Stable 1.1 x 0.8 cm non FDG avid RUL opacity and unchanged 4 mm posterior RUL nodule. No adenopathy. Inferior right breast uptake (SUV 6.09) likely infectious or inflammatory.     Biopsy would be very difficult due to COPD. Last PFTs in 2020 showed FEV1 0.57 (24%) and DLCOcor 7.85 (37%)    Specific Question:  SBRT vs monitor. How worrisome are the findings for cancer?     Pertinent Histology:  adeno    The patient's case was presented at the multidisciplinary conference for the above noted reason.  There was a consensus recommendation for the following actions:     Inferior changes c/w radiation changes. The superior one that is peripheral doesn't quite look like cancer but is more solid appearing. Navigational bronch could be tried if/when need a diagnosis and SBRT could be possible, could be some overlap with the prior SBRT field. I recommended CT chest in 2 months since she just got treated for pneumonia.     Case Lead: Zarina Leung

## 2022-10-07 NOTE — PROGRESS NOTES
Jenn is a 67 year old who is being evaluated via a billable telephone visit.      Pt has pain in chest, burning sensation in hands and feet, and pain in lower back.     What phone number would you like to be contacted at? [phone]  How would you like to obtain your AVS? Mail a copy    Nancy Quijano VF       [facility]    PATIENT NAME: Jenn Aparicio  MRN # [id number]   DATE OF VISIT: October 7, 2022  YOB: 1955     CANCER TYPE: Lung adenocarcinoma  STAGE: IA2 [id number] poorly diff adeno RLL, then [id number] IA2 suspected NSCLC RUL, medically inoperable     ECOG PS: 1    PD-L1: N/A  Lung panel: N/A  NGS: N/A    SUMMARY  12/24/15 PET/CT  1/13/15 CT lung bx. Adenocarcinoma  2/8/16 R thoracotomy, RLL lobectomy (Dr. Cunha). Adenocarcinoma, 1.1 cm, assoicated with atypical adenomatous hyperplasia  10/18/19 CTA for shortness of breath. New 1.3 cm RUL nodule  11/2019 PET/CT. 1.1 cm RUL hypermetabolic nodule (SUV 12.6)  12/26/19 Brain MRI negative for mets  1/14~1/28/20 SBRT to RUL nodule, 5000 cGy, every other day, 1000 [place] [facility] [place] No bx due to O2 dependence and too much risk  8/19/20 First visit with me   11/29/21 CT chest. New 10 mm RUL nodule  1/11/22 CT chest. New 7.2 mm RUL nodule, unchanged RUL nodules  3/31/22 CT chest. New RUL nodule from Jan 2022 7-->10 mm, new 5 mm ROBERT nodule  5/20~5/24/22 [facility] for COPD exacerbation.   6/24/22 CT chest non contrast.   8/16/22 EGD. 3 cm hiatal hernia, o/w normal  8/31/22 CT chest. 2.5 x 1.3 cm R midlung subpleural nodularity (4:98) previously 2.3 x 1.1 cm, slightly increased solid component   10/5/22 PET/CT. 2 x 1.3 cm irregular opacity posterior RUL (SUV 2.46), 2.4 x 0.8 cm linear opacity (SUV 4.76); there was bronchiectasia on prior imaging. Stable 1.1 x 0.8 cm non FDG avid RUL opacity and unchanged 4 mm posterior RUL nodule. No adenopathy. Inferior right breast uptake (SUV 6.09) likely infectious or inflammatory.     ASSESSMENT AND PLAN    NSCLC, adeno, RUL: Asked her to have a PET/CT after the last CT showed an increasing solid component in the R lung and another area a little higher up that looked a little worse. Discussed I'm a little more worried about the posterior RUL linear opacity although there was bronchiectasis on prior imaging there. I'm not quite as worried about the GGO with the increasing solid component posteriorly but it could still be something like hyperplasia or lepidic adeno. Biopsying either of these areas would not be feasible due to the underlying COPD and I'm not sure they're accessible endobronchially. I'd like to discuss at tumor board on Tues. I'll let Jenn know the outcome. We discussed that SBRT could be an option if the suspicion was high enough for a cancer without a bx but that of course, it would be very highly desirable to get a tissue diagnosis first. Overall, I favor ongoing monitoring. Will schedule next follow up based on tumor board discussion outcome.     Peripheral neuropathy: Worse than before, A1c 8/31 was almost 10, hasn't had a chance to talk to her Endocrine yet, appointment not scheduled until late Jan - see below. B12 was also a little low but MMA was normal, so unlikely to be significant B12 deficiency.    Candidiasis: Will send script for miconazole powder. She's been using corn starch    DM2: Between PCPs so I referred to Endocrinology from our last visit 8/31, scheduled in late Jan, will message to get that moved up.     COPD: TTE 9/22/22 pretty unremarkable.     30 minutes spent on this phone call.     Zarina Leung MD  Associate Professor of Medicine  Hematology, Oncology and Transplantation      SUBJECTIVE  Jenn returns today for follow up of h/o lung cancer. We've been watching a RUL nodule and then new ROBERT nodules that showed up in March, stable on the 6/24/22 CT chest non contrast.     Was on antibiotics and prednisone about a week ago for COPD exacerbation  Still achy, neuropathy still  unchanged since last visit but worse than before - see above and prior note  Moving at the end of the month  Hasn't talked to anyone about the DM.  Had a stressful morning because her ride was very late, she had to get up very very early, and ultimately, the person giving the ride didn't want to bring her in because it was so late so asked her to get out of the van. Also been stressed with the PET/CT and worry about her health.     PAST MEDICAL HISTORY  Lung adenocarcinoma  DM2 with neuropathy  HCV IA, undetectable in 2019, treated  COPD. O2 dependent since late 2018. PFT 11/26/19. FEV1 0.64 (30%), FVC 1.69 (63%), DLCOunc 9.8 (38%).  HTN  H/o ITP  H/o disk herniation  Ankle surgery  Median neuropathy R  H/o sessile colon polyps 2014, h/o adenomas before that. Colonoscopy 2/7/18 [place] [facility] [place] Sessile serated adenoma x 1, tubular adenoma x 3  H/o chronic LBP  Dyslipidemia  Cataracts    CURRENT OUTPATIENT MEDICATIONS  Current Outpatient Medications   Medication Sig Dispense Refill     albuterol (PROAIR HFA/PROVENTIL HFA/VENTOLIN HFA) 108 (90 Base) MCG/ACT inhaler INHALE 1 OR 2 PUFFS BY MOUTH EVERY 4 HOURS AS NEEDED 18 g 3     alcohol swab prep pads Use to swab area of injection/xander as directed. 100 each 1     Alpha-Lipoic Acid 300 MG CAPS Take 300 mg by mouth daily 90 capsule 3     amLODIPine (NORVASC) 10 MG tablet Take 1 tablet (10 mg) by mouth daily 90 tablet 3     aspirin 81 MG EC tablet Take 1 tablet (81 mg) by mouth daily 90 tablet 3     benzonatate (TESSALON) 200 MG capsule Take 1 capsule (200 mg) by mouth 3 times daily as needed for cough 100 capsule 3     cetirizine (ZYRTEC) 10 MG tablet Take 1 tablet (10 mg) by mouth daily 30 tablet 10     coenzyme Q-10 (CO-Q10) 50 MG capsule Take 1 capsule (50 mg) by mouth daily 90 capsule 3     Continuous Blood Gluc Sensor (FREESTYLE GIOVANNI 14 DAY SENSOR) MISC Change every 14 days. 2 each 11     cyclobenzaprine (FLEXERIL) 5 MG tablet Take 1 tablet (5 mg) by mouth 3 times daily as  needed for muscle spasms 30 tablet 0     doxycycline hyclate (VIBRA-TABS) 100 MG tablet Take 1 tablet (100 mg) by mouth 2 times daily 5 tablet 3     Easy Comfort Lancets MISC        fluticasone (FLONASE) 50 MCG/ACT nasal spray Spray 1 spray into both nostrils daily Use at night before bed 15.8 mL 1     Fluticasone-Umeclidin-Vilanterol (TRELEGY ELLIPTA) 200-62.5-25 MCG/INH oral inhaler Inhale 1 puff into the lungs daily 28 each 5     glipiZIDE (GLUCOTROL) 5 MG tablet Take 1 tablet (5 mg) by mouth 2 times daily (before meals) 180 tablet 3     glucosamine-chondroitin 500-400 MG tablet Take 1 tablet by mouth daily 90 tablet 3     ipratropium - albuterol 0.5 mg/2.5 mg/3 mL (DUONEB) 0.5-2.5 (3) MG/3ML neb solution Take 1 vial (3 mLs) by nebulization every 6 hours as needed for shortness of breath / dyspnea or wheezing 1080 mL 0     ketotifen (ZADITOR) 0.025 % ophthalmic solution Place 1 drop into both eyes daily       Lancet Devices (EASY MINI EJECT LANCING DEVICE) MISC        multivitamin w/minerals (THERA-VIT-M) tablet Take 1 tablet by mouth daily 30 tablet 11     omega-3 acid ethyl esters (LOVAZA) 1 g capsule Take 2 capsules (2 g) by mouth every morning AND 2 capsules (2 g) every evening. 360 capsule 2     omeprazole (PRILOSEC) 20 MG DR capsule Take 1 capsule (20 mg) by mouth 2 times daily 180 capsule 3     polyethylene glycol-propylene glycol (SYSTANE) 0.4-0.3 % SOLN ophthalmic solution Place 1 drop into both eyes 4 times daily 5 mL 11     predniSONE (DELTASONE) 20 MG tablet        sodium chloride (NEBUSAL) 3 % neb solution Take 3 mLs by nebulization daily Use 1-2 times daily in combination with Duoneb and Aerobika to help with mucus clearance 250 mL 3     STATIN NOT PRESCRIBED (INTENTIONAL) Please choose reason not prescribed from choices below.       Omega-3-Acid Eth Est, Dietary, 1 g CAPS Take 2 capfuls by mouth 2 times daily (Patient not taking: Reported on 10/7/2022) 360 capsule 1     omeprazole (PRILOSEC) 20 MG  DR capsule Take 1 capsule (20 mg) by mouth 2 times daily (Patient not taking: Reported on 10/7/2022) 180 capsule 3     ALLERGIES  Allergies   Allergen Reactions     Interferons Dermatitis     Penicillins Hives     Aspirin      325mg      Colon Care       REVIEW OF SYSTEMS  As above in the HPI, o/w complete 12-point ROS was negative.    PHYSICAL EXAM  There were no vitals taken for this visit.  GEN: NAD, voice sounds normal    Remainder of physical exam deferred due to public health emergency and limitations of phone visit.    LABORATORY AND IMAGING STUDIES   09/22/22 09:42 09/22/22 09:43   Sodium  142   Potassium  3.4   Chloride  101   Carbon Dioxide (CO2)  30 (H)   Urea Nitrogen  8.0   Creatinine  0.47 (L)   GFR Estimate  >90   Calcium  9.6   Anion Gap  11   Albumin  4.1   Protein Total  7.0   Alkaline Phosphatase  110 (H)   ALT  9 (L)   AST  16   Bilirubin Total  0.2   Ferritin 94    Glucose  183 (H)   Iron 106    Iron Binding Capacity 304    Iron Sat Index 35    Methylmalonic Acid <0.10    Parietal Cell Antibody IgG 1.3      Labs were independently reviewed and interpreted by me    PET Oncology Whole Body  Narrative: Combined Report of: PET and CT on 10/5/2022 8:39 AM:    1. PET of the neck, chest, abdomen, and pelvis.  2. PET CT Fusion for Attenuation Correction and Anatomical  Localization.  3. Diagnostic CT of the chest, abdomen and pelvis with intravenous  contrast obtained for diagnostic interpretation.  4. 3D MIP and PET-CT fused images were processed on an independent  workstation and archived to PACS and reviewed by a radiologist.    Technique:    1. PET: The patient received 12.28 mCi of F-18-FDG. The serum glucose  was 244 prior to administration. Body weight was 93.8 kg. Images were  evaluated in the axial, sagittal, and coronal planes as well as the  rotational whole body MIP. Images were acquired from the cranial  vertex to the feet.    UPTAKE WAS MEASURED AT 60 MINUTES.     BACKGROUND: Liver SUV max  = 3.98, Aorta Blood SUV Max = 3.3.     2. CT: Volumetric acquisition for clinical interpretation of the  chest, abdomen, and pelvis acquired at 3 mm sections. The chest,  abdomen, and pelvis were evaluated at 5 mm sections in bone, soft  tissue, and lung windows.    Contrast and Medications:  IV contrast: 113 mL of Isovue 370 intravenously.  PO contrast: 500 ml Breeza.  Additional Medications: None.    3. 3D MIP and PET-CT fused images were processed on an independent  workstation and archived to PACS and reviewed by a radiologist.    INDICATION: H/o lung adenocarcinoma, new R GGO with increasing solid  component and another area in the right lung posteriorly that has an  increasing solid component. Evaluate for FDG avidity to suggest  recurrent lung cancer; Malignant neoplasm of lower lobe of right lung  (H).    ADDITIONAL INFORMATION OBTAINED FROM EMR: 2/8/16: R thoracotomy, RLL  lobectomy. Medically inoperable stage IA2 (cT1b N0 M0) likely NSCLC of  RUL.  1/14~1/28/20: SBRT to RUL nodule. No bx due to O2 dependence and too  much risk.    COMPARISON: Chest CT from 8/31/2022, 5/4/2022, and 11/29/2021.    FINDINGS:     HEAD/NECK:  There is no suspicious FDG uptake in the neck.    Mucosal thickening of the left maxillary sinus. The mastoid air cells  are clear.     No hypermetabolic lymph nodes are demonstrated in the neck.     The mucosal and deep spaces of the neck are unremarkable. The major  salivary glands are unremarkable. The thyroid is unremarkable. The  major vasculature of the neck are patent.    CHEST:  Right lower lobectomy.    Irregular opacity within the posterior right upper lobe (series 7  image 43), measuring 2 x 1.3 cm with SUV max 2.46, previously 2.3 x  1.3 cm on 8/31/2022. This opacity changing in nature since 5/4/2022,  which was solitary pulmonary nodule, now irregular semisolid opacity.    Mildly hypermetabolic linear opacity (series 7 image 46) extending  from the subsegmental right upper  lobe bronchi to the peripheral lung,  measuring 2.4 x 0.8 cm with SUV max 4.76;  there was bronchiectasia on  prior imaging.    Stable right upper lobe irregular opacity (series 7 image 21),  measuring 1.1 x 0.8 cm, not FDG avid. Unchanged non-FDG avid 4 mm  pulmonary nodule posterior right upper lobe (image 29).    Stable left upper lobe posterior clustered nodularity. Possible right  upper lobe bronchiectasis (series 7 image 49), similar to prior study.    Extensive emphysematous changes. No pleural effusion or pneumothorax.     No hypermetabolic lymph nodes are demonstrated in the chest. Focus of  FDG uptake within the continuous inferior right breast with SUV max  6.09, likely represents infectious/inflammatory process    Heart size is within normal limits. No pericardial effusion. The  thoracic aorta and main pulmonary artery are within normal limits for  diameter. The esophagus is unremarkable.     ABDOMEN AND PELVIS:  There is no suspicious FDG uptake in the abdomen or pelvis.    The liver is unremarkable. The gallbladder is unremarkable. No  intrahepatic or extrahepatic biliary ductal dilatation. The pancreas  is unremarkable. No pancreatic ductal dilatation. The spleen is  unremarkable. The adrenal glands are unremarkable.    Symmetric enhancement of the kidneys. No hydronephrosis. The urinary  bladder is unremarkable. The reproductive organs are unremarkable.    No hypermetabolic lymph nodes are demonstrated in the abdomen or  pelvis.    Normal caliber of the small and large bowel. Colon diverticulosis  without diverticulitis. No free air, free fluid, or fluid collection.  Normal caliber of the abdominal aorta.    LOWER EXTREMITIES:   There is no suspicious FDG uptake in the visualized lower extremities.    BONES:   There is no suspicious FDG uptake in the skeleton. There are no  suspicious lytic or blastic osseous lesions.   Impression: IMPRESSION: In this patient with right lung cancer status post  right  lobectomy and radiation to the right upper lobe nodule:    1. Right upper lobe irregular semi-solid opacity is non-FDG avid,  unchanged in size compared to prior study, there has been changing in  nature, previously 6 mm solid pulmonary nodule. Absence of metabolism  does not exclude the underlying malignancy. Attention on follow-up.    2. Mildly hypermetabolic linear opacity extending from the  subsegmental right upper lobe bronchi to the peripheral lung,  previously there was bronchiectasia, may represent mucous plug and  infectious process given the presence of metabolism. Attention on  follow-up.    3.Other pulmonary findings, including right upper lobe 1.1 cm  irregular opacity and pulmonary nodule/ clustered nodularities as  described above in detail are stable.    4. No evidence to suggest metastatic disease.    5. Focus of FDG uptake within the continuous inferior right breast  with SUV max 6.09, likely represents infectious/inflammatory process.  Direct visualization is recommended.    I have personally reviewed the examination and initial interpretation  and I agree with the findings.    JAMIE SANCHEZ MD         SYSTEM ID:  [id number]  CT Chest/Abdomen/Pelvis w Contrast  Narrative: Combined Report of: PET and CT on 10/5/2022 8:39 AM:    1. PET of the neck, chest, abdomen, and pelvis.  2. PET CT Fusion for Attenuation Correction and Anatomical  Localization.  3. Diagnostic CT of the chest, abdomen and pelvis with intravenous  contrast obtained for diagnostic interpretation.  4. 3D MIP and PET-CT fused images were processed on an independent  workstation and archived to PACS and reviewed by a radiologist.    Technique:    1. PET: The patient received 12.28 mCi of F-18-FDG. The serum glucose  was 244 prior to administration. Body weight was 93.8 kg. Images were  evaluated in the axial, sagittal, and coronal planes as well as the  rotational whole body MIP. Images were acquired from the cranial  vertex  to the feet.    UPTAKE WAS MEASURED AT 60 MINUTES.     BACKGROUND: Liver SUV max = 3.98, Aorta Blood SUV Max = 3.3.     2. CT: Volumetric acquisition for clinical interpretation of the  chest, abdomen, and pelvis acquired at 3 mm sections. The chest,  abdomen, and pelvis were evaluated at 5 mm sections in bone, soft  tissue, and lung windows.    Contrast and Medications:  IV contrast: 113 mL of Isovue 370 intravenously.  PO contrast: 500 ml Breeza.  Additional Medications: None.    3. 3D MIP and PET-CT fused images were processed on an independent  workstation and archived to PACS and reviewed by a radiologist.    INDICATION: H/o lung adenocarcinoma, new R GGO with increasing solid  component and another area in the right lung posteriorly that has an  increasing solid component. Evaluate for FDG avidity to suggest  recurrent lung cancer; Malignant neoplasm of lower lobe of right lung  (H).    ADDITIONAL INFORMATION OBTAINED FROM EMR: 2/8/16: R thoracotomy, RLL  lobectomy. Medically inoperable stage IA2 (cT1b N0 M0) likely NSCLC of  RUL.  1/14~1/28/20: SBRT to RUL nodule. No bx due to O2 dependence and too  much risk.    COMPARISON: Chest CT from 8/31/2022, 5/4/2022, and 11/29/2021.    FINDINGS:     HEAD/NECK:  There is no suspicious FDG uptake in the neck.    Mucosal thickening of the left maxillary sinus. The mastoid air cells  are clear.     No hypermetabolic lymph nodes are demonstrated in the neck.     The mucosal and deep spaces of the neck are unremarkable. The major  salivary glands are unremarkable. The thyroid is unremarkable. The  major vasculature of the neck are patent.    CHEST:  Right lower lobectomy.    Irregular opacity within the posterior right upper lobe (series 7  image 43), measuring 2 x 1.3 cm with SUV max 2.46, previously 2.3 x  1.3 cm on 8/31/2022. This opacity changing in nature since 5/4/2022,  which was solitary pulmonary nodule, now irregular semisolid opacity.    Mildly hypermetabolic  linear opacity (series 7 image 46) extending  from the subsegmental right upper lobe bronchi to the peripheral lung,  measuring 2.4 x 0.8 cm with SUV max 4.76;  there was bronchiectasia on  prior imaging.    Stable right upper lobe irregular opacity (series 7 image 21),  measuring 1.1 x 0.8 cm, not FDG avid. Unchanged non-FDG avid 4 mm  pulmonary nodule posterior right upper lobe (image 29).    Stable left upper lobe posterior clustered nodularity. Possible right  upper lobe bronchiectasis (series 7 image 49), similar to prior study.    Extensive emphysematous changes. No pleural effusion or pneumothorax.     No hypermetabolic lymph nodes are demonstrated in the chest. Focus of  FDG uptake within the continuous inferior right breast with SUV max  6.09, likely represents infectious/inflammatory process    Heart size is within normal limits. No pericardial effusion. The  thoracic aorta and main pulmonary artery are within normal limits for  diameter. The esophagus is unremarkable.     ABDOMEN AND PELVIS:  There is no suspicious FDG uptake in the abdomen or pelvis.    The liver is unremarkable. The gallbladder is unremarkable. No  intrahepatic or extrahepatic biliary ductal dilatation. The pancreas  is unremarkable. No pancreatic ductal dilatation. The spleen is  unremarkable. The adrenal glands are unremarkable.    Symmetric enhancement of the kidneys. No hydronephrosis. The urinary  bladder is unremarkable. The reproductive organs are unremarkable.    No hypermetabolic lymph nodes are demonstrated in the abdomen or  pelvis.    Normal caliber of the small and large bowel. Colon diverticulosis  without diverticulitis. No free air, free fluid, or fluid collection.  Normal caliber of the abdominal aorta.    LOWER EXTREMITIES:   There is no suspicious FDG uptake in the visualized lower extremities.    BONES:   There is no suspicious FDG uptake in the skeleton. There are no  suspicious lytic or blastic osseous lesions.    Impression: IMPRESSION: In this patient with right lung cancer status post right  lobectomy and radiation to the right upper lobe nodule:    1. Right upper lobe irregular semi-solid opacity is non-FDG avid,  unchanged in size compared to prior study, there has been changing in  nature, previously 6 mm solid pulmonary nodule. Absence of metabolism  does not exclude the underlying malignancy. Attention on follow-up.    2. Mildly hypermetabolic linear opacity extending from the  subsegmental right upper lobe bronchi to the peripheral lung,  previously there was bronchiectasia, may represent mucous plug and  infectious process given the presence of metabolism. Attention on  follow-up.    3.Other pulmonary findings, including right upper lobe 1.1 cm  irregular opacity and pulmonary nodule/ clustered nodularities as  described above in detail are stable.    4. No evidence to suggest metastatic disease.    5. Focus of FDG uptake within the continuous inferior right breast  with SUV max 6.09, likely represents infectious/inflammatory process.  Direct visualization is recommended.    I have personally reviewed the examination and initial interpretation  and I agree with the findings.    JAMIE SANCHEZ MD         SYSTEM ID:  [id number]     Imaging was personally reviewed and interpreted by me and compared to prior CT chests

## 2022-10-10 DIAGNOSIS — E11.9 TYPE 2 DIABETES MELLITUS WITHOUT COMPLICATION, WITHOUT LONG-TERM CURRENT USE OF INSULIN (H): Primary | ICD-10-CM

## 2022-10-11 ENCOUNTER — PATIENT OUTREACH (OUTPATIENT)
Dept: ONCOLOGY | Facility: CLINIC | Age: 67
End: 2022-10-11

## 2022-10-11 ENCOUNTER — TEAM CONFERENCE (OUTPATIENT)
Dept: ONCOLOGY | Facility: CLINIC | Age: 67
End: 2022-10-11

## 2022-10-11 ENCOUNTER — VIRTUAL VISIT (OUTPATIENT)
Dept: GASTROENTEROLOGY | Facility: CLINIC | Age: 67
End: 2022-10-11
Payer: COMMERCIAL

## 2022-10-11 DIAGNOSIS — C34.31 MALIGNANT NEOPLASM OF LOWER LOBE OF RIGHT LUNG (H): Primary | ICD-10-CM

## 2022-10-11 DIAGNOSIS — K21.9 GASTROESOPHAGEAL REFLUX DISEASE WITHOUT ESOPHAGITIS: Primary | ICD-10-CM

## 2022-10-11 PROCEDURE — 99213 OFFICE O/P EST LOW 20 MIN: CPT | Mod: 95 | Performed by: PHYSICIAN ASSISTANT

## 2022-10-11 NOTE — PATIENT INSTRUCTIONS
It was a pleasure taking care of you today.  I've included a brief summary of our discussion and care plan from today's visit below.  Please review this information with your primary care provider.  _______________________________________________________________________    My recommendations are summarized as follows:    Omeprazole 20 mg once daily to be taken on an empty stomach 20-30 minutes prior to first meal of the day   If symptoms become worse could consider video swallow study   Follow up in 6 months time     To schedule endoscopic procedures you may call: [phone]  To schedule radiology (imaging) tests you may call: [phone]  To schedule an ENT appointment you may call: [phone]    Please call my nurse Nicole ([phone]), Gustavo ([phone]) with any questions or concerns.      Return to GI Clinic in 6 months to review your progress.    _______________________________________________________________________    Who do I call with any questions after my visit?  Please be in touch if there are any further questions that arise following today's visit.  There are multiple ways to contact your gastroenterology care team.      During business hours, you may reach a Gastroenterology nurse at [phone] and choose option 3.       To schedule or reschedule an appointment, please call [phone].     You can always send a secure message through SoundTag.  SoundTag messages are answered by your nurse or doctor typically within 24 hours.  Please allow extra time on weekends and holidays.      For urgent/emergent questions after business hours, you may reach the on-call GI Fellow by contacting the [facility] at [phone].     How will I get the results of any tests ordered?    You will receive all of your results.  If you have signed up for MyChart, any tests ordered at your visit will be available to you after your physician reviews them.  Typically this takes 1-2 weeks.  If there  are urgent results that require a change in your care plan, your physician or nurse will call you to discuss the next steps.      What is [employer]?  [facility] is a secure way for you to access all of your healthcare records from the [facility].  It is a web based computer program, so you can sign on to it from any location.  It also allows you to send secure messages to your care team.  I recommend signing up for [facility] access if you have not already done so and are comfortable with using a computer.      How to I schedule a follow-up visit?  If you did not schedule a follow-up visit today, please call [phone] to schedule a follow-up office visit.      If you feel you received exceptional care and are interested in supporting the clinical and research goals of Dr. Rodrigues or the Division of Gastroenterology, Hepatology, and Nutrition please contact [email or web address] from the [facility] to discuss opportunities to donate.    Sincerely,    Gabriela Damon PA-C  Division of Gastroenterology, Hepatology, and Nutrition  [facility]

## 2022-10-11 NOTE — PROGRESS NOTES
[facility]: Cancer Care                                                                                          At the request of Dr. Leung, I called the patient to follow up on the discussion today from the Tumor Board Conference.    The following was reviewed:   The nodule I'm worried about could be accessible by navigational bronch, but I think with her recently treated pneumonia and slow growing nature, would be better to wait a little longer in case it looks a little worse now because of the pneumonia.     There was a second area that I was worried about before the PET/CT in the R lung that I'm not as worried about after the PET/CT. It looks like post-radiation changes - it's close to where the spot that was treated with SBRT in 2020 used to be and was not FDG avid     The patient voiced understanding and had no other questions or concerns at this time.    PLAN    Will schedule 2 month follow up with CT Chest and visit with Dr. Leung    Signature:  Rodrigo Armstrong DNP, RN, OCN  RN Care Coordinator  [facility]

## 2022-10-11 NOTE — LETTER
10/11/2022         RE: Jenn Aparicio  [address]  [place] [address]        Dear Colleague,    Thank you for referring your patient, Jenn Aparicio, to the [facility]. Please see a copy of my visit note below.    Jenn is a 67 year old who is being evaluated via a billable video visit.      How would you like to obtain your AVS? MyChart  If the video visit is dropped, the invitation should be resent by: Text to cell phone: [phone]  Will anyone else be joining your video visit? No        Video-Visit Details    Video Start Time: 9: 42    Type of service:  Video Visit    Video End Time: 9:57     Originating Location (pt. Location): Home    Distant Location (provider location):  [facility]     Platform used for Video Visit: PlayEnable     Gastroenterology Visit for: Jenn Aparicio 1955   MRN: [id number]     Reason for Visit:  chief complaint    Referred by: [facility]  / [facility] [id number]  Patient Care Team:  Mitzi Nettles APRN CNP as PCP - General (Nurse Practitioner)  Zarina Leung MD as MD (Internal Medicine-Hematology & Oncology)  Latesha Christianson, JOHANN as Clinic Care Coordinator  Zarina Leung MD as Assigned Cancer Care Provider  Mitzi Nettles APRN CNP as Assigned PCP  Vilma Montejo MD as Assigned Sleep Provider  Joe Landers OD as Assigned Surgical Provider  Lynda Spann MD as Fellow (Pulmonary Disease)  Earl Mcgrath RPH as Pharmacist (Pharmacist Clinician- Clinical Pharmacy Specialist)  Travis Briseno MD as Assigned Gastroenterology Provider  La Nena Shabazz MD as MD (Endocrinology, Diabetes, and Metabolism)  Earl Mcgrath RPH as Assigned MTM Pharmacist  Rodrigo Armstrong RN as Specialty Care Coordinator (Hematology & Oncology)  Nithya Narvaez RN as Diabetes Educator (Diabetes Education)    History of Present Illness:   Jenn Aparicio  is a 67 year old female with past medical history notable for lung adenocarcinoma DM II, COPD, HTN, HLD and ITP who is presenting for follow up on GERD/dysphagia.     ---------------------------------------------------------------  Jenn Aparicio states today that she is doing much better.     She takes Omeprazole 20 mg once daily     Notes she continues to have difficulties swallowing pills however this is not distressing to her. She now takes her pills separate rather then multiple at a time without difficulty.     Symptoms of dysphagia have improved with the addition of the antacids. Heartburn has also significantly improved.     No troubles with solid/liquid dysphagia.     Has a BM daily that is more often then not is formed. Rarely will have looser stools pending what she eats.     ---------------------------------------------------------------     Jenn Aparicio denies odynophagia, heartburn/reflux, nausea, vomiting, early satiety, abdominal pain, abdominal distension/bloating, diarrhea, constipation, hematochezia, or melena. No unintentional weight loss.       No family history of GI cancer.       Wt Readings from Last 5 Encounters:   08/31/22 93.8 kg (206 lb 11.2 oz)   08/16/22 90.7 kg (200 lb)   08/10/22 93.4 kg (206 lb)   08/04/22 94.4 kg (208 lb 1.6 oz)   06/29/22 95 kg (209 lb 6.4 oz)        Esophageal Questionnaire(s)    BEDQ Questionnaire  No flowsheet data found.  No flowsheet data found.    Eckardt Questionnaire  No flowsheet data found.    Promis 10 Questionnaire  No flowsheet data found.        STUDIES & PROCEDURES:    EGD:     8/16/2022  Impression:       - Normal esophagus.                             - Esophagogastric landmarks identified.                             - 3 cm hiatal hernia. Otherwise normal stomach.                             - Normal duodenum.                             - No specimens collected.     Colonoscopy:     EndoFLIP directed at the UES or LES (8cm (EF-325) balloon length or  16cm (EF-322) balloon length):   Date:  8cm balloon  Balloon inflation Balloon pressure CSA (mm^2) DI (mm^2/mmHg) Dmin (mm) Compliance   20 (ladmark ID)        30        40        50           16cm balloon  Balloon inflation Balloon pressure CSA (mm^2) DI (mm^2/mmHg) Dmin (mm) Compliance   30 (ladmark ID)        40        50        60        70           High Resolution Manometry:    PH/Impedance:       Bravo:      CT:    Esophagram:    5/23/2022   IMPRESSION: Small hiatal hernia with spontaneous gastroesophageal  reflux up to the level of the clavicle is visualized. Tertiary  contractions suggestive of esophageal dysmotility.    Prior medical records were reviewed including, but not limited to, notes from referring providers, lab work, radiographic tests, and other diagnostic tests. Pertinent results were summarized above.     History     Past Medical History:   Diagnosis Date     Adenocarcinoma, lung (H)      Asthma      Ectopic pregnancy      Esophageal reflux      Pulmonary emphysema (H)     Very severe FEV1<30% predicted     Type II diabetes mellitus (H)        Past Surgical History:   Procedure Laterality Date     2/8/16                R thoracotomy, RLL lobectomy (Dr. Cunha). Adenocarcinoma, 1.1 cm, assoicated with atypical adenomatous hyperplasia Right 02/08/2016    R thoracotomy, RLL lobectomy (Dr. Cunha). Adenocarcinoma, 1.1 cm, assoicated with atypical adenomatous hyperplasia     ESOPHAGOSCOPY, GASTROSCOPY, DUODENOSCOPY (EGD), COMBINED N/A 8/16/2022    Procedure: ESOPHAGOGASTRODUODENOSCOPY (EGD);  Surgeon: Travis Briseno MD;  Location:  GI     ORTHOPEDIC SURGERY         Social History     Socioeconomic History     Marital status: Single     Spouse name: Not on file     Number of children: Not on file     Years of education: Not on file     Highest education level: Not on file   Occupational History     Not on file   Tobacco Use     Smoking status: Every Day     Packs/day: 0.25     Types: Cigarettes      Smokeless tobacco: Former     Tobacco comments:     5/23/22 Patient using 7mg patch, 2 mg lozenges, took workbook   Substance and Sexual Activity     Alcohol use: Yes     Comment: sometime     Drug use: No     Sexual activity: Not Currently     Partners: Male   Other Topics Concern     Not on file   Social History Narrative    Ms. Aparicio lives in an apartment complex by herself as of May 2022. She has frequent smoke exposure from neighbors even when she is not smoking herself.      Social Determinants of Health     Financial Resource Strain: Not on file   Food Insecurity: Not on file   Transportation Needs: Not on file   Physical Activity: Not on file   Stress: Not on file   Social Connections: Not on file   Intimate Partner Violence: Not on file   Housing Stability: Not on file       Family History   Problem Relation Age of Onset     Lung Cancer Sister      Depression Daughter      Alcohol/Drug Other         self     Diabetes Other         self     Thyroid Disease Other         self     Asthma Other         self     Family history reviewed and edited as appropriate    Medications and Allergies:     Outpatient Encounter Medications as of 10/11/2022   Medication Sig Dispense Refill     albuterol (PROAIR HFA/PROVENTIL HFA/VENTOLIN HFA) 108 (90 Base) MCG/ACT inhaler INHALE 1 OR 2 PUFFS BY MOUTH EVERY 4 HOURS AS NEEDED 18 g 3     alcohol swab prep pads Use to swab area of injection/xander as directed. 100 each 1     Alpha-Lipoic Acid 300 MG CAPS Take 300 mg by mouth daily 90 capsule 3     amLODIPine (NORVASC) 10 MG tablet Take 1 tablet (10 mg) by mouth daily 90 tablet 3     aspirin 81 MG EC tablet Take 1 tablet (81 mg) by mouth daily 90 tablet 3     benzonatate (TESSALON) 200 MG capsule Take 1 capsule (200 mg) by mouth 3 times daily as needed for cough 100 capsule 3     cetirizine (ZYRTEC) 10 MG tablet Take 1 tablet (10 mg) by mouth daily 30 tablet 10     coenzyme Q-10 (CO-Q10) 50 MG capsule Take 1 capsule (50 mg) by  mouth daily 90 capsule 3     Continuous Blood Gluc Sensor (FREESTYLE GIOVANNI 14 DAY SENSOR) MISC Change every 14 days. 2 each 11     cyclobenzaprine (FLEXERIL) 5 MG tablet Take 1 tablet (5 mg) by mouth 3 times daily as needed for muscle spasms 30 tablet 0     doxycycline hyclate (VIBRA-TABS) 100 MG tablet Take 1 tablet (100 mg) by mouth 2 times daily 5 tablet 3     Easy Comfort Lancets MISC        fluticasone (FLONASE) 50 MCG/ACT nasal spray Spray 1 spray into both nostrils daily Use at night before bed 15.8 mL 1     Fluticasone-Umeclidin-Vilanterol (TRELEGY ELLIPTA) 200-62.5-25 MCG/INH oral inhaler Inhale 1 puff into the lungs daily 28 each 5     glipiZIDE (GLUCOTROL) 5 MG tablet Take 1 tablet (5 mg) by mouth 2 times daily (before meals) 180 tablet 3     glucosamine-chondroitin 500-400 MG tablet Take 1 tablet by mouth daily 90 tablet 3     ipratropium - albuterol 0.5 mg/2.5 mg/3 mL (DUONEB) 0.5-2.5 (3) MG/3ML neb solution Take 1 vial (3 mLs) by nebulization every 6 hours as needed for shortness of breath / dyspnea or wheezing 1080 mL 0     ketotifen (ZADITOR) 0.025 % ophthalmic solution Place 1 drop into both eyes daily       Lancet Devices (EASY MINI EJECT LANCING DEVICE) MISC        multivitamin w/minerals (THERA-VIT-M) tablet Take 1 tablet by mouth daily 30 tablet 11     omega-3 acid ethyl esters (LOVAZA) 1 g capsule Take 2 capsules (2 g) by mouth every morning AND 2 capsules (2 g) every evening. 360 capsule 2     Omega-3-Acid Eth Est, Dietary, 1 g CAPS Take 2 capfuls by mouth 2 times daily (Patient not taking: Reported on 10/7/2022) 360 capsule 1     omeprazole (PRILOSEC) 20 MG DR capsule Take 1 capsule (20 mg) by mouth 2 times daily 180 capsule 3     omeprazole (PRILOSEC) 20 MG DR capsule Take 1 capsule (20 mg) by mouth 2 times daily (Patient not taking: Reported on 10/7/2022) 180 capsule 3     polyethylene glycol-propylene glycol (SYSTANE) 0.4-0.3 % SOLN ophthalmic solution Place 1 drop into both eyes 4 times  daily 5 mL 11     predniSONE (DELTASONE) 20 MG tablet        sodium chloride (NEBUSAL) 3 % neb solution Take 3 mLs by nebulization daily Use 1-2 times daily in combination with Duoneb and Aerobika to help with mucus clearance 250 mL 3     STATIN NOT PRESCRIBED (INTENTIONAL) Please choose reason not prescribed from choices below.       No facility-administered encounter medications on file as of 10/11/2022.        Allergies   Allergen Reactions     Interferons Dermatitis     Penicillins Hives     Aspirin      325mg      Colon Care         Review of systems:  A full 10 point review of systems was obtained and was negative except for the pertinent positives and negatives stated within the HPI.    Objective Findings:   Physical Exam:    Constitutional: There were no vitals taken for this visit.  General: Alert, cooperative, no distress, well-appearing  Head: Atraumatic, normocephalic, no obvious abnormalities   Eyes: Sclera anicteric, no obvious conjunctival hemorrhage   Nose: Nares normal, no obvious malformation, no obvious rhinorrhea   Respiratory: Resting comfortably   Skin: No jaundice, no obvious rash  Neurologic: AAOx3, no obvious neurologic abnormality  Psychiatric: Normal Affect, appropriate mood     Labs, Radiology, Pathology     Lab Results   Component Value Date    WBC 7.9 09/22/2022    WBC 7.2 08/31/2022    WBC 10.5 05/24/2022    HGB 13.7 09/22/2022    HGB 14.7 08/31/2022    HGB 12.4 05/24/2022     09/22/2022     08/31/2022     05/24/2022    CHOL 251 (H) 01/27/2022    CHOL 224 (H) 01/11/2022    TRIG 127 01/27/2022    TRIG 102 01/11/2022    HDL 58 01/27/2022    HDL 55 01/11/2022    ALT 9 (L) 09/22/2022    ALT 9 (L) 08/31/2022    ALT 18 05/20/2022    AST 16 09/22/2022    AST 17 08/31/2022    AST 8 05/20/2022     09/22/2022     08/31/2022     05/24/2022    BUN 8.0 09/22/2022    BUN 6.5 (L) 08/31/2022    BUN 17 05/24/2022    CO2 30 (H) 09/22/2022    CO2 29 08/31/2022     CO2 30 05/24/2022    TSH 2.65 08/31/2022    TSH 1.50 02/07/2008    INR 1.02 02/23/2021    INR 0.97 04/30/2008        Liver Function Studies -   Recent Labs   Lab Test 09/22/22  0943   PROTTOTAL 7.0   ALBUMIN 4.1   BILITOTAL 0.2   ALKPHOS 110*   AST 16   ALT 9*          Patient Active Problem List    Diagnosis Date Noted     Gastroesophageal reflux disease without esophagitis 05/24/2022     Priority: Medium     Esophageal dysphagia 05/24/2022     Priority: Medium     Shortness of breath 05/21/2022     Priority: Medium     Chest pain, unspecified type 05/21/2022     Priority: Medium     Hypokalemia 05/21/2022     Priority: Medium     Hypoxia 05/20/2022     Priority: Medium     Diabetic neuropathy (H) 11/18/2021     Priority: Medium     Malnutrition (H) 06/25/2021     Priority: Medium     Chronic obstructive pulmonary disease, unspecified COPD type (H) 02/23/2021     Priority: Medium     Primary lung adenocarcinoma (H) STAGE: IA2 [id number] poorly diff adeno RLL, then [id number] IA2 suspected NSCLC RUL, medically inoperable  12/15/2020     Priority: Medium     Pulmonary emphysema (H) 09/22/2020     Priority: Medium     Type 2 diabetes mellitus without complication, without long-term current use of insulin (H) 01/09/2019     Priority: Medium     Cervical stenosis (uterine cervix) 03/18/2011     Priority: Medium     Vaginitis 03/14/2011     Priority: Medium     Postmenopausal bleeding 03/14/2011     Priority: Medium      Assessment and Plan   Assessment:    Jenn Aparicio is 67 year old female with Jenn Aparicio is a 67 year old female with past medical history notable for lung adenocarcinoma DM II, COPD, HTN, HLD and ITP who is presenting for follow up on GERD/dysphagia.     She had recently underwent EGD 8/2022 that was notable for 3 cm HH. Dilation was not performed. Esophagram with mild tertiary contractions suggesting possible motility disorder.     Pt was started on Omeprazole 20 mg BID however is taking this once daily  with adequate control of her GERD/improvement in dysphagia. She continues to have mild pill dysphagia that is not impacting ADLs nor distressing to her. Offered to complete additional imaging studies including FL VSS for further evaluation however pt is content at this point and if her symptoms become worse we could consider this at a later time.        Plan:    Omeprazole 20 mg once daily to be taken on an empty stomach 20-30 minutes prior to first meal of the day   If symptoms were to become worse could consider video swallow study   Follow up in 6 months time           Follow up plan:   Return to clinic 6 months and as needed.    The risks and benefits of my recommendations, as well as other treatment options were discussed with the patient and any available family today. All questions were answered.     o Follow up: As planned above. Today, I personally spent 15 minutes in direct face to face time with the patient, of which greater than 50% of the time was spent in patient education and counseling as described above. Approximately 15 minutes were spent on indirect care associated with the patient's consultation including but not limited to review of: patient medical records to date, clinic visits, hospital records, lab results, imaging studies, procedural documentation, and coordinating care with other providers. The findings from this review are summarized in the above note. All of the above accounted for a cumulative time of 30 minutes and was performed on the date of service.     The patient verbalized understanding of the plan and was appreciative for the time spent and information provided during the office visit.     Gabriela Damon PA-C  Division of Gastroenterology, Hepatology, and Nutrition  [facility]     Documentation assisted by voice recognition and documentation system.    Attestation signed by Travis Briseno MD at 10/13/2022  1:47 PM:  I performed a history and  the above patient and  discussed the management with Gabriela Damon PA-C on 10/13/2022. I reviewed the note and there are no changes.  I agree with the documented findings and plan of care as outlined with the following additions:        Again, thank you for allowing me to participate in the care of your patient.      Sincerely,    Travis Briseno MD

## 2022-10-11 NOTE — PROGRESS NOTES
Jenn is a 67 year old who is being evaluated via a billable video visit.      How would you like to obtain your AVS? MyChart  If the video visit is dropped, the invitation should be resent by: Text to cell phone: [phone]  Will anyone else be joining your video visit? No        Video-Visit Details    Video Start Time: 9: 42    Type of service:  Video Visit    Video End Time: 9:57     Originating Location (pt. Location): Home    Distant Location (provider location):  [facility] [place]     Platform used for Video Visit: [facility]     Gastroenterology Visit for: Jenn Aparicio 1955   MRN: [id number]     Reason for Visit:  chief complaint    Referred by: Finn  / [facility] / [facility] [id number]  Patient Care Team:  Mitzi Nettles APRN CNP as PCP - General (Nurse Practitioner)  Zarina Leung MD as MD (Internal Medicine-Hematology & Oncology)  Latesha Christianson, JOHANN as Clinic Care Coordinator  Zarina Leung MD as Assigned Cancer Care Provider  Mitzi Nettles APRN CNP as Assigned PCP  Vilma Montejo MD as Assigned Sleep Provider  Joe Landers OD as Assigned Surgical Provider  Lynda Spann MD as Fellow (Pulmonary Disease)  Earl Mcgrath RPH as Pharmacist (Pharmacist Clinician- Clinical Pharmacy Specialist)  Travis Briseno MD as Assigned Gastroenterology Provider  La Nena Shabazz MD as MD (Endocrinology, Diabetes, and Metabolism)  Earl Mcgrath RPH as Assigned MTM Pharmacist  Rodrigo Armstrong RN as Specialty Care Coordinator (Hematology & Oncology)  Nithya Narvaez RN as Diabetes Educator (Diabetes Education)    History of Present Illness:   Jenn Aparicio is a 67 year old female with past medical history notable for lung adenocarcinoma DM II, COPD, HTN, HLD and ITP who is presenting for follow up on GERD/dysphagia.     ---------------------------------------------------------------  Jenn Aparicio states today that she is  doing much better.     She takes Omeprazole 20 mg once daily     Notes she continues to have difficulties swallowing pills however this is not distressing to her. She now takes her pills separate rather then multiple at a time without difficulty.     Symptoms of dysphagia have improved with the addition of the antacids. Heartburn has also significantly improved.     No troubles with solid/liquid dysphagia.     Has a BM daily that is more often then not is formed. Rarely will have looser stools pending what she eats.     ---------------------------------------------------------------     Jenn Aparicio denies odynophagia, heartburn/reflux, nausea, vomiting, early satiety, abdominal pain, abdominal distension/bloating, diarrhea, constipation, hematochezia, or melena. No unintentional weight loss.       No family history of GI cancer.       Wt Readings from Last 5 Encounters:   08/31/22 93.8 kg (206 lb 11.2 oz)   08/16/22 90.7 kg (200 lb)   08/10/22 93.4 kg (206 lb)   08/04/22 94.4 kg (208 lb 1.6 oz)   06/29/22 95 kg (209 lb 6.4 oz)        Esophageal Questionnaire(s)    BEDQ Questionnaire  No flowsheet data found.  No flowsheet data found.    Eckardt Questionnaire  No flowsheet data found.    Promis 10 Questionnaire  No flowsheet data found.        STUDIES & PROCEDURES:    EGD:     8/16/2022  Impression:       - Normal esophagus.                             - Esophagogastric landmarks identified.                             - 3 cm hiatal hernia. Otherwise normal stomach.                             - Normal duodenum.                             - No specimens collected.     Colonoscopy:     EndoFLIP directed at the UES or LES (8cm (EF-325) balloon length or 16cm (EF-322) balloon length):   Date:  8cm balloon  Balloon inflation Balloon pressure CSA (mm^2) DI (mm^2/mmHg) Dmin (mm) Compliance   20 (ladmark ID)        30        40        50           16cm balloon  Balloon inflation Balloon pressure CSA (mm^2) DI (mm^2/mmHg)  Dmin (mm) Compliance   30 (ladmark ID)        40        50        60        70           High Resolution Manometry:    PH/Impedance:       Bravo:      CT:    Esophagram:    5/23/2022   IMPRESSION: Small hiatal hernia with spontaneous gastroesophageal  reflux up to the level of the clavicle is visualized. Tertiary  contractions suggestive of esophageal dysmotility.    Prior medical records were reviewed including, but not limited to, notes from referring providers, lab work, radiographic tests, and other diagnostic tests. Pertinent results were summarized above.     History     Past Medical History:   Diagnosis Date     Adenocarcinoma, lung (H)      Asthma      Ectopic pregnancy      Esophageal reflux      Pulmonary emphysema (H)     Very severe FEV1<30% predicted     Type II diabetes mellitus (H)        Past Surgical History:   Procedure Laterality Date     2/8/16                R thoracotomy, RLL lobectomy (Dr. Cunha). Adenocarcinoma, 1.1 cm, assoicated with atypical adenomatous hyperplasia Right 02/08/2016    R thoracotomy, RLL lobectomy (Dr. Cunha). Adenocarcinoma, 1.1 cm, assoicated with atypical adenomatous hyperplasia     ESOPHAGOSCOPY, GASTROSCOPY, DUODENOSCOPY (EGD), COMBINED N/A 8/16/2022    Procedure: ESOPHAGOGASTRODUODENOSCOPY (EGD);  Surgeon: Travis Briseno MD;  Location:  GI     ORTHOPEDIC SURGERY         Social History     Socioeconomic History     Marital status: Single     Spouse name: Not on file     Number of children: Not on file     Years of education: Not on file     Highest education level: Not on file   Occupational History     Not on file   Tobacco Use     Smoking status: Every Day     Packs/day: 0.25     Types: Cigarettes     Smokeless tobacco: Former     Tobacco comments:     5/23/22 Patient using 7mg patch, 2 mg lozenges, took workbook   Substance and Sexual Activity     Alcohol use: Yes     Comment: sometime     Drug use: No     Sexual activity: Not Currently     Partners: Male    Other Topics Concern     Not on file   Social History Narrative    Ms. Aparicio lives in an apartment complex by herself as of May 2022. She has frequent smoke exposure from neighbors even when she is not smoking herself.      Social Determinants of Health     Financial Resource Strain: Not on file   Food Insecurity: Not on file   Transportation Needs: Not on file   Physical Activity: Not on file   Stress: Not on file   Social Connections: Not on file   Intimate Partner Violence: Not on file   Housing Stability: Not on file       Family History   Problem Relation Age of Onset     Lung Cancer Sister      Depression Daughter      Alcohol/Drug Other         self     Diabetes Other         self     Thyroid Disease Other         self     Asthma Other         self     Family history reviewed and edited as appropriate    Medications and Allergies:     Outpatient Encounter Medications as of 10/11/2022   Medication Sig Dispense Refill     albuterol (PROAIR HFA/PROVENTIL HFA/VENTOLIN HFA) 108 (90 Base) MCG/ACT inhaler INHALE 1 OR 2 PUFFS BY MOUTH EVERY 4 HOURS AS NEEDED 18 g 3     alcohol swab prep pads Use to swab area of injection/xander as directed. 100 each 1     Alpha-Lipoic Acid 300 MG CAPS Take 300 mg by mouth daily 90 capsule 3     amLODIPine (NORVASC) 10 MG tablet Take 1 tablet (10 mg) by mouth daily 90 tablet 3     aspirin 81 MG EC tablet Take 1 tablet (81 mg) by mouth daily 90 tablet 3     benzonatate (TESSALON) 200 MG capsule Take 1 capsule (200 mg) by mouth 3 times daily as needed for cough 100 capsule 3     cetirizine (ZYRTEC) 10 MG tablet Take 1 tablet (10 mg) by mouth daily 30 tablet 10     coenzyme Q-10 (CO-Q10) 50 MG capsule Take 1 capsule (50 mg) by mouth daily 90 capsule 3     Continuous Blood Gluc Sensor (FREESTYLE GIOVANNI 14 DAY SENSOR) MISC Change every 14 days. 2 each 11     cyclobenzaprine (FLEXERIL) 5 MG tablet Take 1 tablet (5 mg) by mouth 3 times daily as needed for muscle spasms 30 tablet 0      doxycycline hyclate (VIBRA-TABS) 100 MG tablet Take 1 tablet (100 mg) by mouth 2 times daily 5 tablet 3     Easy Comfort Lancets MISC        fluticasone (FLONASE) 50 MCG/ACT nasal spray Spray 1 spray into both nostrils daily Use at night before bed 15.8 mL 1     Fluticasone-Umeclidin-Vilanterol (TRELEGY ELLIPTA) 200-62.5-25 MCG/INH oral inhaler Inhale 1 puff into the lungs daily 28 each 5     glipiZIDE (GLUCOTROL) 5 MG tablet Take 1 tablet (5 mg) by mouth 2 times daily (before meals) 180 tablet 3     glucosamine-chondroitin 500-400 MG tablet Take 1 tablet by mouth daily 90 tablet 3     ipratropium - albuterol 0.5 mg/2.5 mg/3 mL (DUONEB) 0.5-2.5 (3) MG/3ML neb solution Take 1 vial (3 mLs) by nebulization every 6 hours as needed for shortness of breath / dyspnea or wheezing 1080 mL 0     ketotifen (ZADITOR) 0.025 % ophthalmic solution Place 1 drop into both eyes daily       Lancet Devices (EASY MINI EJECT LANCING DEVICE) MISC        multivitamin w/minerals (THERA-VIT-M) tablet Take 1 tablet by mouth daily 30 tablet 11     omega-3 acid ethyl esters (LOVAZA) 1 g capsule Take 2 capsules (2 g) by mouth every morning AND 2 capsules (2 g) every evening. 360 capsule 2     Omega-3-Acid Eth Est, Dietary, 1 g CAPS Take 2 capfuls by mouth 2 times daily (Patient not taking: Reported on 10/7/2022) 360 capsule 1     omeprazole (PRILOSEC) 20 MG DR capsule Take 1 capsule (20 mg) by mouth 2 times daily 180 capsule 3     omeprazole (PRILOSEC) 20 MG DR capsule Take 1 capsule (20 mg) by mouth 2 times daily (Patient not taking: Reported on 10/7/2022) 180 capsule 3     polyethylene glycol-propylene glycol (SYSTANE) 0.4-0.3 % SOLN ophthalmic solution Place 1 drop into both eyes 4 times daily 5 mL 11     predniSONE (DELTASONE) 20 MG tablet        sodium chloride (NEBUSAL) 3 % neb solution Take 3 mLs by nebulization daily Use 1-2 times daily in combination with Duoneb and Aerobika to help with mucus clearance 250 mL 3     STATIN NOT  PRESCRIBED (INTENTIONAL) Please choose reason not prescribed from choices below.       No facility-administered encounter medications on file as of 10/11/2022.        Allergies   Allergen Reactions     Interferons Dermatitis     Penicillins Hives     Aspirin      325mg      Colon Care         Review of systems:  A full 10 point review of systems was obtained and was negative except for the pertinent positives and negatives stated within the HPI.    Objective Findings:   Physical Exam:    Constitutional: There were no vitals taken for this visit.  General: Alert, cooperative, no distress, well-appearing  Head: Atraumatic, normocephalic, no obvious abnormalities   Eyes: Sclera anicteric, no obvious conjunctival hemorrhage   Nose: Nares normal, no obvious malformation, no obvious rhinorrhea   Respiratory: Resting comfortably   Skin: No jaundice, no obvious rash  Neurologic: AAOx3, no obvious neurologic abnormality  Psychiatric: Normal Affect, appropriate mood     Labs, Radiology, Pathology     Lab Results   Component Value Date    WBC 7.9 09/22/2022    WBC 7.2 08/31/2022    WBC 10.5 05/24/2022    HGB 13.7 09/22/2022    HGB 14.7 08/31/2022    HGB 12.4 05/24/2022     09/22/2022     08/31/2022     05/24/2022    CHOL 251 (H) 01/27/2022    CHOL 224 (H) 01/11/2022    TRIG 127 01/27/2022    TRIG 102 01/11/2022    HDL 58 01/27/2022    HDL 55 01/11/2022    ALT 9 (L) 09/22/2022    ALT 9 (L) 08/31/2022    ALT 18 05/20/2022    AST 16 09/22/2022    AST 17 08/31/2022    AST 8 05/20/2022     09/22/2022     08/31/2022     05/24/2022    BUN 8.0 09/22/2022    BUN 6.5 (L) 08/31/2022    BUN 17 05/24/2022    CO2 30 (H) 09/22/2022    CO2 29 08/31/2022    CO2 30 05/24/2022    TSH 2.65 08/31/2022    TSH 1.50 02/07/2008    INR 1.02 02/23/2021    INR 0.97 04/30/2008        Liver Function Studies -   Recent Labs   Lab Test 09/22/22  0943   PROTTOTAL 7.0   ALBUMIN 4.1   BILITOTAL 0.2   ALKPHOS 110*   AST 16    ALT 9*          Patient Active Problem List    Diagnosis Date Noted     Gastroesophageal reflux disease without esophagitis 05/24/2022     Priority: Medium     Esophageal dysphagia 05/24/2022     Priority: Medium     Shortness of breath 05/21/2022     Priority: Medium     Chest pain, unspecified type 05/21/2022     Priority: Medium     Hypokalemia 05/21/2022     Priority: Medium     Hypoxia 05/20/2022     Priority: Medium     Diabetic neuropathy (H) 11/18/2021     Priority: Medium     Malnutrition (H) 06/25/2021     Priority: Medium     Chronic obstructive pulmonary disease, unspecified COPD type (H) 02/23/2021     Priority: Medium     Primary lung adenocarcinoma (H) STAGE: IA2 [id number] poorly diff adeno RLL, then [id number] IA2 suspected NSCLC RUL, medically inoperable  12/15/2020     Priority: Medium     Pulmonary emphysema (H) 09/22/2020     Priority: Medium     Type 2 diabetes mellitus without complication, without long-term current use of insulin (H) 01/09/2019     Priority: Medium     Cervical stenosis (uterine cervix) 03/18/2011     Priority: Medium     Vaginitis 03/14/2011     Priority: Medium     Postmenopausal bleeding 03/14/2011     Priority: Medium      Assessment and Plan   Assessment:    Jenn Aparicio is 67 year old female with Jenn Aparicio is a 67 year old female with past medical history notable for lung adenocarcinoma DM II, COPD, HTN, HLD and ITP who is presenting for follow up on GERD/dysphagia.     She had recently underwent EGD 8/2022 that was notable for 3 cm HH. Dilation was not performed. Esophagram with mild tertiary contractions suggesting possible motility disorder.     Pt was started on Omeprazole 20 mg BID however is taking this once daily with adequate control of her GERD/improvement in dysphagia. She continues to have mild pill dysphagia that is not impacting ADLs nor distressing to her. Offered to complete additional imaging studies including FL VSS for further evaluation however pt is  content at this point and if her symptoms become worse we could consider this at a later time.        Plan:    Omeprazole 20 mg once daily to be taken on an empty stomach 20-30 minutes prior to first meal of the day   If symptoms were to become worse could consider video swallow study   Follow up in 6 months time           Follow up plan:   Return to clinic 6 months and as needed.    The risks and benefits of my recommendations, as well as other treatment options were discussed with the patient and any available family today. All questions were answered.     o Follow up: As planned above. Today, I personally spent 15 minutes in direct face to face time with the patient, of which greater than 50% of the time was spent in patient education and counseling as described above. Approximately 15 minutes were spent on indirect care associated with the patient's consultation including but not limited to review of: patient medical records to date, clinic visits, hospital records, lab results, imaging studies, procedural documentation, and coordinating care with other providers. The findings from this review are summarized in the above note. All of the above accounted for a cumulative time of 30 minutes and was performed on the date of service.     The patient verbalized understanding of the plan and was appreciative for the time spent and information provided during the office visit.           Gabriela Damon PA-C  Division of Gastroenterology, Hepatology, and Nutrition  [facility]       Documentation assisted by voice recognition and documentation system.

## 2022-11-11 ENCOUNTER — VIRTUAL VISIT (OUTPATIENT)
Dept: EDUCATION SERVICES | Facility: CLINIC | Age: 67
End: 2022-11-11
Attending: INTERNAL MEDICINE
Payer: COMMERCIAL

## 2022-11-11 DIAGNOSIS — E11.9 TYPE 2 DIABETES MELLITUS WITHOUT COMPLICATION, WITHOUT LONG-TERM CURRENT USE OF INSULIN (H): ICD-10-CM

## 2022-11-11 PROCEDURE — 99207 PR NO BILLABLE SERVICE THIS VISIT: CPT

## 2022-11-25 ENCOUNTER — TELEPHONE (OUTPATIENT)
Dept: ENDOCRINOLOGY | Facility: CLINIC | Age: 67
End: 2022-11-25

## 2022-11-25 NOTE — TELEPHONE ENCOUNTER
Spoke w/ Pt: unable to complete diet form until after visit Jan/2023. Pt will come to clinic to retrieve diet form.   Luanne Yanez RN on 11/25/2022 at 12:16 PM

## 2022-12-01 ENCOUNTER — LAB (OUTPATIENT)
Dept: LAB | Facility: CLINIC | Age: 67
End: 2022-12-01
Payer: COMMERCIAL

## 2022-12-01 ENCOUNTER — TELEPHONE (OUTPATIENT)
Dept: INTERNAL MEDICINE | Facility: CLINIC | Age: 67
End: 2022-12-01

## 2022-12-01 ENCOUNTER — ANCILLARY PROCEDURE (OUTPATIENT)
Dept: CT IMAGING | Facility: CLINIC | Age: 67
End: 2022-12-01
Attending: INTERNAL MEDICINE
Payer: COMMERCIAL

## 2022-12-01 ENCOUNTER — ONCOLOGY VISIT (OUTPATIENT)
Dept: ONCOLOGY | Facility: CLINIC | Age: 67
End: 2022-12-01
Attending: INTERNAL MEDICINE
Payer: COMMERCIAL

## 2022-12-01 VITALS
HEART RATE: 109 BPM | RESPIRATION RATE: 22 BRPM | SYSTOLIC BLOOD PRESSURE: 145 MMHG | TEMPERATURE: 98.9 F | BODY MASS INDEX: 32.43 KG/M2 | WEIGHT: 201.8 LBS | HEIGHT: 66 IN | OXYGEN SATURATION: 92 % | DIASTOLIC BLOOD PRESSURE: 74 MMHG

## 2022-12-01 DIAGNOSIS — C34.31 MALIGNANT NEOPLASM OF LOWER LOBE OF RIGHT LUNG (H): ICD-10-CM

## 2022-12-01 DIAGNOSIS — E11.42 TYPE 2 DIABETES MELLITUS WITH DIABETIC POLYNEUROPATHY, WITHOUT LONG-TERM CURRENT USE OF INSULIN (H): Primary | ICD-10-CM

## 2022-12-01 LAB
ALBUMIN SERPL BCG-MCNC: 4 G/DL (ref 3.5–5.2)
ALP SERPL-CCNC: 109 U/L (ref 35–104)
ALT SERPL W P-5'-P-CCNC: <5 U/L (ref 10–35)
ANION GAP SERPL CALCULATED.3IONS-SCNC: 11 MMOL/L (ref 7–15)
AST SERPL W P-5'-P-CCNC: 14 U/L (ref 10–35)
BASOPHILS # BLD AUTO: 0 10E3/UL (ref 0–0.2)
BASOPHILS NFR BLD AUTO: 1 %
BILIRUB SERPL-MCNC: 0.3 MG/DL
BUN SERPL-MCNC: 6.2 MG/DL (ref 8–23)
CALCIUM SERPL-MCNC: 9.1 MG/DL (ref 8.8–10.2)
CHLORIDE SERPL-SCNC: 99 MMOL/L (ref 98–107)
CREAT SERPL-MCNC: 0.48 MG/DL (ref 0.51–0.95)
DEPRECATED HCO3 PLAS-SCNC: 30 MMOL/L (ref 22–29)
EOSINOPHIL # BLD AUTO: 0.3 10E3/UL (ref 0–0.7)
EOSINOPHIL NFR BLD AUTO: 4 %
ERYTHROCYTE [DISTWIDTH] IN BLOOD BY AUTOMATED COUNT: 13.1 % (ref 10–15)
GFR SERPL CREATININE-BSD FRML MDRD: >90 ML/MIN/1.73M2
GLUCOSE SERPL-MCNC: 215 MG/DL (ref 70–99)
HCT VFR BLD AUTO: 47.6 % (ref 35–47)
HGB BLD-MCNC: 14.9 G/DL (ref 11.7–15.7)
IMM GRANULOCYTES # BLD: 0 10E3/UL
IMM GRANULOCYTES NFR BLD: 0 %
LYMPHOCYTES # BLD AUTO: 2 10E3/UL (ref 0.8–5.3)
LYMPHOCYTES NFR BLD AUTO: 24 %
MCH RBC QN AUTO: 28.7 PG (ref 26.5–33)
MCHC RBC AUTO-ENTMCNC: 31.3 G/DL (ref 31.5–36.5)
MCV RBC AUTO: 92 FL (ref 78–100)
MONOCYTES # BLD AUTO: 0.6 10E3/UL (ref 0–1.3)
MONOCYTES NFR BLD AUTO: 7 %
NEUTROPHILS # BLD AUTO: 5.4 10E3/UL (ref 1.6–8.3)
NEUTROPHILS NFR BLD AUTO: 64 %
NRBC # BLD AUTO: 0 10E3/UL
NRBC BLD AUTO-RTO: 0 /100
PLATELET # BLD AUTO: 191 10E3/UL (ref 150–450)
POTASSIUM SERPL-SCNC: 3.7 MMOL/L (ref 3.4–5.3)
PROT SERPL-MCNC: 7 G/DL (ref 6.4–8.3)
RBC # BLD AUTO: 5.2 10E6/UL (ref 3.8–5.2)
SODIUM SERPL-SCNC: 140 MMOL/L (ref 136–145)
WBC # BLD AUTO: 8.4 10E3/UL (ref 4–11)

## 2022-12-01 PROCEDURE — 36415 COLL VENOUS BLD VENIPUNCTURE: CPT | Performed by: PATHOLOGY

## 2022-12-01 PROCEDURE — 80053 COMPREHEN METABOLIC PANEL: CPT | Performed by: PATHOLOGY

## 2022-12-01 PROCEDURE — G0463 HOSPITAL OUTPT CLINIC VISIT: HCPCS

## 2022-12-01 PROCEDURE — 71250 CT THORAX DX C-: CPT | Mod: GC | Performed by: RADIOLOGY

## 2022-12-01 PROCEDURE — 99215 OFFICE O/P EST HI 40 MIN: CPT | Performed by: INTERNAL MEDICINE

## 2022-12-01 PROCEDURE — 85025 COMPLETE CBC W/AUTO DIFF WBC: CPT | Performed by: PATHOLOGY

## 2022-12-01 ASSESSMENT — PAIN SCALES - GENERAL: PAINLEVEL: WORST PAIN (10)

## 2022-12-01 NOTE — PROGRESS NOTES
[facility]    PATIENT NAME: Jenn Aparicio  MRN # [id number]   DATE OF VISIT: December 1, 2022  YOB: 1955     CANCER TYPE: Lung adenocarcinoma  STAGE: IA2 [id number] poorly diff adeno RLL, then [id number] IA2 suspected NSCLC RUL, medically inoperable     ECOG PS: 1    PD-L1: N/A  Lung panel: N/A  NGS: N/A    SUMMARY  12/24/15 PET/CT  1/13/15 CT lung bx. Adenocarcinoma  2/8/16 R thoracotomy, RLL lobectomy (Dr. Cunha). Adenocarcinoma, 1.1 cm, assoicated with atypical adenomatous hyperplasia  10/18/19 CTA for shortness of breath. New 1.3 cm RUL nodule  11/2019 PET/CT. 1.1 cm RUL hypermetabolic nodule (SUV 12.6)  12/26/19 Brain MRI negative for mets  1/14~1/28/20 SBRT to RUL nodule, 5000 cGy, every other day, 1000 cGy (Dr. Currie at [facility] [place]). No bx due to O2 dependence and too much risk  8/19/20 First visit with me   11/29/21 CT chest. New 10 mm RUL nodule  1/11/22 CT chest. New 7.2 mm RUL nodule, unchanged RUL nodules  3/31/22 CT chest. New RUL nodule from Jan 2022 7-->10 mm, new 5 mm ROBERT nodule  5/20~5/24/22 [facility] for COPD exacerbation.   6/24/22 CT chest non contrast.   8/16/22 EGD. 3 cm hiatal hernia, o/w normal  8/31/22 CT chest. 2.5 x 1.3 cm R midlung subpleural nodularity (4:98) previously 2.3 x 1.1 cm, slightly increased solid component   10/5/22 PET/CT. 2 x 1.3 cm irregular opacity posterior RUL (SUV 2.46), 2.4 x 0.8 cm linear opacity (SUV 4.76); there was bronchiectasia on prior imaging. Stable 1.1 x 0.8 cm non FDG avid RUL opacity and unchanged 4 mm posterior RUL nodule. No adenopathy. Inferior right breast uptake (SUV 6.09) likely infectious or inflammatory.     ASSESSMENT AND PLAN  NSCLC, adeno, RUL: The post radiation changes look fairly stable (4:88-95) and was not FDG avid on the PET/CT in Oct 2022 (SUV 2.46). The more peripheral change that was mildly FDG avid (4.76) continues to evolve - the ground glass part seems a little better and the elongated solid part is more consolidated  (series 4 image 107). Size is largely unchanged. In light of this, I don't think we need to pursue additional evaluation at this time. I discussed with Jenn that I'm not 100% sure it's not cancer, especially in light of the two prior cancers, but it would be reasonable to re-evaluate with CT in 3 months. She is ok with this plan. The other tiny nodules -for example, in the RUL and really tiny ones in the ROBERT - are stable and have been for a while.     Peripheral neuropathy, DM2: Even worse than before. She's not established with a PCP yet and her appointment with Endocrinology is not until the end of Jan (about 4 months after the referral was placed). I stressed the critical importance of establishing with a PCP for ongoing management, even with Endocrinology on board. Since both are not likely to happen in the coming weeks, I will try to reach out to one of my colleagues to see what next steps would be. Will also try to get one of our diabetes educator colleagues to work with her.     Letter: Jenn dropped off a letter for me to complete - I haven't seen it but may have been completed last week while I was OOO. Will ask clinic team.    40 minutes spent on the date of the encounter doing chart review, history and exam, documentation and further activities per the note     Zarina Leung MD  Associate Professor of Medicine  Hematology, Oncology and Transplantation      SUBJECTIVE  Jenn returns for close interim follow up of R lung nodules.   Neuropathy is even worse. Burning. Making it more difficult to walk.   Eye exam in a few months. Vision is worse.   Finally moved. Still unpacking. No other interim changes     PAST MEDICAL HISTORY  Lung adenocarcinoma  DM2 with neuropathy  HCV IA, undetectable in 2019, treated  COPD. O2 dependent since late 2018. PFT 11/26/19. FEV1 0.64 (30%), FVC 1.69 (63%), DLCOunc 9.8 (38%).  HTN  H/o ITP  H/o disk herniation  Ankle surgery  Median neuropathy R  H/o sessile colon polyps  2014, h/o adenomas before that. Colonoscopy 2/7/18 @ [facility] [place]. Sessile serated adenoma x 1, tubular adenoma x 3  H/o chronic LBP  Dyslipidemia  Cataracts    CURRENT OUTPATIENT MEDICATIONS  Current Outpatient Medications   Medication Sig Dispense Refill     albuterol (PROAIR HFA/PROVENTIL HFA/VENTOLIN HFA) 108 (90 Base) MCG/ACT inhaler INHALE 1 OR 2 PUFFS BY MOUTH EVERY 4 HOURS AS NEEDED 18 g 3     alcohol swab prep pads Use to swab area of injection/xander as directed. 100 each 1     Alpha-Lipoic Acid 300 MG CAPS Take 300 mg by mouth daily 90 capsule 3     amLODIPine (NORVASC) 10 MG tablet Take 1 tablet (10 mg) by mouth daily 90 tablet 3     aspirin 81 MG EC tablet Take 1 tablet (81 mg) by mouth daily 90 tablet 3     benzonatate (TESSALON) 200 MG capsule Take 1 capsule (200 mg) by mouth 3 times daily as needed for cough 100 capsule 3     cetirizine (ZYRTEC) 10 MG tablet Take 1 tablet (10 mg) by mouth daily 30 tablet 10     coenzyme Q-10 (CO-Q10) 50 MG capsule Take 1 capsule (50 mg) by mouth daily 90 capsule 3     Continuous Blood Gluc Sensor (FREESTYLE GIOVANNI 14 DAY SENSOR) MISC Change every 14 days. 2 each 11     cyclobenzaprine (FLEXERIL) 5 MG tablet Take 1 tablet (5 mg) by mouth 3 times daily as needed for muscle spasms 30 tablet 0     doxycycline hyclate (VIBRA-TABS) 100 MG tablet Take 1 tablet (100 mg) by mouth 2 times daily 5 tablet 3     Easy Comfort Lancets MISC        fluticasone (FLONASE) 50 MCG/ACT nasal spray Spray 1 spray into both nostrils daily Use at night before bed 15.8 mL 1     Fluticasone-Umeclidin-Vilanterol (TRELEGY ELLIPTA) 200-62.5-25 MCG/INH oral inhaler Inhale 1 puff into the lungs daily 28 each 5     glipiZIDE (GLUCOTROL) 5 MG tablet Take 1 tablet (5 mg) by mouth 2 times daily (before meals) 180 tablet 3     glucosamine-chondroitin 500-400 MG tablet Take 1 tablet by mouth daily 90 tablet 3     ipratropium - albuterol 0.5 mg/2.5 mg/3 mL (DUONEB) 0.5-2.5 (3) MG/3ML neb solution Take 1 vial (3 mLs)  "by nebulization every 6 hours as needed for shortness of breath / dyspnea or wheezing 1080 mL 0     ketotifen (ZADITOR) 0.025 % ophthalmic solution Place 1 drop into both eyes daily       Lancet Devices (EASY MINI EJECT LANCING DEVICE) MISC        multivitamin w/minerals (THERA-VIT-M) tablet Take 1 tablet by mouth daily 30 tablet 11     omega-3 acid ethyl esters (LOVAZA) 1 g capsule Take 2 capsules (2 g) by mouth every morning AND 2 capsules (2 g) every evening. 360 capsule 2     Omega-3-Acid Eth Est, Dietary, 1 g CAPS Take 2 capfuls by mouth 2 times daily 360 capsule 1     omeprazole (PRILOSEC) 20 MG DR capsule Take 1 capsule (20 mg) by mouth 2 times daily 180 capsule 3     omeprazole (PRILOSEC) 20 MG DR capsule Take 1 capsule (20 mg) by mouth 2 times daily 180 capsule 3     polyethylene glycol-propylene glycol (SYSTANE) 0.4-0.3 % SOLN ophthalmic solution Place 1 drop into both eyes 4 times daily 5 mL 11     predniSONE (DELTASONE) 20 MG tablet        sodium chloride (NEBUSAL) 3 % neb solution Take 3 mLs by nebulization daily Use 1-2 times daily in combination with Duoneb and Aerobika to help with mucus clearance 250 mL 3     STATIN NOT PRESCRIBED (INTENTIONAL) Please choose reason not prescribed from choices below.       ALLERGIES  Allergies   Allergen Reactions     Interferons Dermatitis     Penicillins Hives     Aspirin      325mg      Colon Care       REVIEW OF SYSTEMS  As above in the HPI, o/w complete 12-point ROS was negative.    PHYSICAL EXAM  BP (!) 145/74 (BP Location: Right arm, Patient Position: Sitting, Cuff Size: Adult Large)   Pulse 109   Temp 98.9  F (37.2  C) (Oral)   Resp 22   Ht 1.676 m (5' 5.98\")   Wt 91.5 kg (201 lb 12.8 oz)   SpO2 92%   BMI 32.59 kg/m    GEN: NAD  HEENT: EOMI, no icterus, injection or pallor  EXT: no edema  NEURO: alert    LABORATORY AND IMAGING STUDIES   12/01/22 10:18 12/01/22 10:19   Sodium  140   Potassium  3.7   Chloride  99   Carbon Dioxide (CO2)  30 (H)   Urea " Nitrogen  6.2 (L)   Creatinine  0.48 (L)   GFR Estimate  >90   Calcium  9.1   Anion Gap  11   Albumin  4.0   Protein Total  7.0   Alkaline Phosphatase  109 (H)   ALT  <5 (L)   AST  14   Bilirubin Total  0.3   Glucose  215 (H)   WBC 8.4    Hemoglobin 14.9    Hematocrit 47.6 (H)    Platelet Count 191    RBC Count 5.20    MCV 92    MCH 28.7    MCHC 31.3 (L)    RDW 13.1    % Neutrophils 64    % Lymphocytes 24    % Monocytes 7    % Eosinophils 4    % Basophils 1    Absolute Basophils 0.0    Absolute Eosinophils 0.3    Absolute Immature Granulocytes 0.0    Absolute Lymphocytes 2.0    Absolute Monocytes 0.6    % Immature Granulocytes 0    Absolute Neutrophils 5.4    Absolute NRBCs 0.0    NRBCs per 100 WBC 0      Labs were independently reviewed and interpreted by me    EXAMINATION: CT CHEST W/O CONTRAST, 12/1/2022 10:16 AM     CLINICAL HISTORY: restage h/o lung adenocarcinoma R lung s/p resection  and then recurrence s/p SBRT in 2020. New peripheral R lung nodule  increasing, mildly FDG avid on PET/CT, re-evaluate; Malignant neoplasm  of lower lobe of right lung (H)     COMPARISON: PET/CT 10/5/2022. Chest CT 8/31/2022.     TECHNIQUE: CT imaging obtained through the chest without contrast.  Coronal and axial MIP reformatted images obtained.      CONTRAST:  none.     FINDINGS:     Mediastinum: No cardiomegaly. No pericardial effusion. Normal caliber  thoracic aorta and main pulmonary trunk. No suspicious  lymphadenopathy.     Lungs/pleura: Prior right lower lobectomy. Unchanged irregularity of  the shaped spiculated nodules in the right lung apex and posterior  right upper lobe, the largest in the posterior right upper lobe on  series 4 image 91 measures 24 x 14 mm. There is associated pleural  tethering and pleural thickening. Reportedly no suspicious FDG uptake  on PET/CT.     Decrease irregular reticulation associated with an area of  bronchiectasis and mucous plugging in the right upper lobe on series 4  image 107.  Unchanged mucous plugging and tree-in-bud nodularity in the  posterior left upper lobe on series 4 image 123.     Again seen confluent centrilobular emphysema.     Upper abdomen: Evaluation of the upper abdomen is limited and without  intravenous contrast.     Bones/soft tissues: No destructive bony lesions. Soft tissues are  unremarkable.                                                                      IMPRESSION:  1. Unchanged non-FDG avid irregularly spiculated right upper lobe  nodule, given increasing density over previous chest CTs, remains  suspicious for recurrent neoplasm versus evolving treatment changes.  2. Unchanged additional smaller irregular spiculated nodules or mucous  plugging in the right upper lung.  3. Persistent infectious/inflammatory changes, mildly decreased in the  right upper lobe.  4. Unchanged mucous plugging and tree-in-bud nodularity in the  posterior left upper lobe       I have personally reviewed the examination and initial interpretation  and I agree with the findings.     TAVON CAMARENA MD     Imaging was personally reviewed and interpreted by me as above

## 2022-12-01 NOTE — LETTER
12/1/2022         RE: Jenn Aparicio  [address]  [place] [address]        Dear Colleague,    Thank you for referring your patient, Jenn Aparicio, to the [facility]. Please see a copy of my visit note below.       [facility]    PATIENT NAME: Jenn Aparicio  MRN # [id number]   DATE OF VISIT: December 1, 2022  YOB: 1955     CANCER TYPE: Lung adenocarcinoma  STAGE: IA2 [id number] poorly diff adeno RLL, then [id number] IA2 suspected NSCLC RUL, medically inoperable     ECOG PS: 1    PD-L1: N/A  Lung panel: N/A  NGS: N/A    SUMMARY  12/24/15 PET/CT  1/13/15 CT lung bx. Adenocarcinoma  2/8/16 R thoracotomy, RLL lobectomy (Dr. Cunha). Adenocarcinoma, 1.1 cm, assoicated with atypical adenomatous hyperplasia  10/18/19 CTA for shortness of breath. New 1.3 cm RUL nodule  11/2019 PET/CT. 1.1 cm RUL hypermetabolic nodule (SUV 12.6)  12/26/19 Brain MRI negative for mets  1/14~1/28/20 SBRT to RUL nodule, 5000 cGy, every other day, 1000 [place] [facility] [place] No bx due to O2 dependence and too much risk  8/19/20 First visit with me   11/29/21 CT chest. New 10 mm RUL nodule  1/11/22 CT chest. New 7.2 mm RUL nodule, unchanged RUL nodules  3/31/22 CT chest. New RUL nodule from Jan 2022 7-->10 mm, new 5 mm ROBERT nodule  5/20~5/24/22 [facility] for COPD exacerbation.   6/24/22 CT chest non contrast.   8/16/22 EGD. 3 cm hiatal hernia, o/w normal  8/31/22 CT chest. 2.5 x 1.3 cm R midlung subpleural nodularity (4:98) previously 2.3 x 1.1 cm, slightly increased solid component   10/5/22 PET/CT. 2 x 1.3 cm irregular opacity posterior RUL (SUV 2.46), 2.4 x 0.8 cm linear opacity (SUV 4.76); there was bronchiectasia on prior imaging. Stable 1.1 x 0.8 cm non FDG avid RUL opacity and unchanged 4 mm posterior RUL nodule. No adenopathy. Inferior right breast uptake (SUV 6.09) likely infectious or inflammatory.     ASSESSMENT AND PLAN  NSCLC, adeno, RUL: The post radiation changes look  fairly stable (4:88-95) and was not FDG avid on the PET/CT in Oct 2022 (SUV 2.46). The more peripheral change that was mildly FDG avid (4.76) continues to evolve - the ground glass part seems a little better and the elongated solid part is more consolidated (series 4 image 107). Size is largely unchanged. In light of this, I don't think we need to pursue additional evaluation at this time. I discussed with Jenn that I'm not 100% sure it's not cancer, especially in light of the two prior cancers, but it would be reasonable to re-evaluate with CT in 3 months. She is ok with this plan. The other tiny nodules -for example, in the RUL and really tiny ones in the ROBERT - are stable and have been for a while.     Peripheral neuropathy, DM2: Even worse than before. She's not established with a PCP yet and her appointment with Endocrinology is not until the end of Jan (about 4 months after the referral was placed). I stressed the critical importance of establishing with a PCP for ongoing management, even with Endocrinology on board. Since both are not likely to happen in the coming weeks, I will try to reach out to one of my colleagues to see what next steps would be. Will also try to get one of our diabetes educator colleagues to work with her.     Letter: Jenn dropped off a letter for me to complete - I haven't seen it but may have been completed last week while I was OOO. Will ask clinic team.    40 minutes spent on the date of the encounter doing chart review, history and exam, documentation and further activities per the note     Zarina Leung MD  Associate Professor of Medicine  Hematology, Oncology and Transplantation      SUBJECTIVE  Jenn returns for close interim follow up of R lung nodules.   Neuropathy is even worse. Burning. Making it more difficult to walk.   Eye exam in a few months. Vision is worse.   Finally moved. Still unpacking. No other interim changes     PAST MEDICAL HISTORY  Lung adenocarcinoma  DM2  with neuropathy  HCV IA, undetectable in 2019, treated  COPD. O2 dependent since late 2018. PFT 11/26/19. FEV1 0.64 (30%), FVC 1.69 (63%), DLCOunc 9.8 (38%).  HTN  H/o ITP  H/o disk herniation  Ankle surgery  Median neuropathy R  H/o sessile colon polyps 2014, h/o adenomas before that. [facility]oscopy 2/7/18 @ [facility] [place]. Sessile serated adenoma x 1, tubular adenoma x 3  H/o chronic LBP  Dyslipidemia  Cataracts    CURRENT OUTPATIENT MEDICATIONS  Current Outpatient Medications   Medication Sig Dispense Refill     albuterol (PROAIR HFA/PROVENTIL HFA/VENTOLIN HFA) 108 (90 Base) MCG/ACT inhaler INHALE 1 OR 2 PUFFS BY MOUTH EVERY 4 HOURS AS NEEDED 18 g 3     alcohol swab prep pads Use to swab area of injection/xander as directed. 100 each 1     Alpha-Lipoic Acid 300 MG CAPS Take 300 mg by mouth daily 90 capsule 3     amLODIPine (NORVASC) 10 MG tablet Take 1 tablet (10 mg) by mouth daily 90 tablet 3     aspirin 81 MG EC tablet Take 1 tablet (81 mg) by mouth daily 90 tablet 3     benzonatate (TESSALON) 200 MG capsule Take 1 capsule (200 mg) by mouth 3 times daily as needed for cough 100 capsule 3     cetirizine (ZYRTEC) 10 MG tablet Take 1 tablet (10 mg) by mouth daily 30 tablet 10     coenzyme Q-10 (CO-Q10) 50 MG capsule Take 1 capsule (50 mg) by mouth daily 90 capsule 3     Continuous Blood Gluc Sensor (FREESTYLE GIOVANNI 14 DAY SENSOR) MISC Change every 14 days. 2 each 11     cyclobenzaprine (FLEXERIL) 5 MG tablet Take 1 tablet (5 mg) by mouth 3 times daily as needed for muscle spasms 30 tablet 0     doxycycline hyclate (VIBRA-TABS) 100 MG tablet Take 1 tablet (100 mg) by mouth 2 times daily 5 tablet 3     Easy Comfort Lancets MISC        fluticasone (FLONASE) 50 MCG/ACT nasal spray Spray 1 spray into both nostrils daily Use at night before bed 15.8 mL 1     Fluticasone-Umeclidin-Vilanterol (TRELEGY ELLIPTA) 200-62.5-25 MCG/INH oral inhaler Inhale 1 puff into the lungs daily 28 each 5     glipiZIDE (GLUCOTROL) 5 MG tablet Take 1  "tablet (5 mg) by mouth 2 times daily (before meals) 180 tablet 3     glucosamine-chondroitin 500-400 MG tablet Take 1 tablet by mouth daily 90 tablet 3     ipratropium - albuterol 0.5 mg/2.5 mg/3 mL (DUONEB) 0.5-2.5 (3) MG/3ML neb solution Take 1 vial (3 mLs) by nebulization every 6 hours as needed for shortness of breath / dyspnea or wheezing 1080 mL 0     ketotifen (ZADITOR) 0.025 % ophthalmic solution Place 1 drop into both eyes daily       Lancet Devices (EASY MINI EJECT LANCING DEVICE) MISC        multivitamin w/minerals (THERA-VIT-M) tablet Take 1 tablet by mouth daily 30 tablet 11     omega-3 acid ethyl esters (LOVAZA) 1 g capsule Take 2 capsules (2 g) by mouth every morning AND 2 capsules (2 g) every evening. 360 capsule 2     Omega-3-Acid Eth Est, Dietary, 1 g CAPS Take 2 capfuls by mouth 2 times daily 360 capsule 1     omeprazole (PRILOSEC) 20 MG DR capsule Take 1 capsule (20 mg) by mouth 2 times daily 180 capsule 3     omeprazole (PRILOSEC) 20 MG DR capsule Take 1 capsule (20 mg) by mouth 2 times daily 180 capsule 3     polyethylene glycol-propylene glycol (SYSTANE) 0.4-0.3 % SOLN ophthalmic solution Place 1 drop into both eyes 4 times daily 5 mL 11     predniSONE (DELTASONE) 20 MG tablet        sodium chloride (NEBUSAL) 3 % neb solution Take 3 mLs by nebulization daily Use 1-2 times daily in combination with Duoneb and Aerobika to help with mucus clearance 250 mL 3     STATIN NOT PRESCRIBED (INTENTIONAL) Please choose reason not prescribed from choices below.       ALLERGIES  Allergies   Allergen Reactions     Interferons Dermatitis     Penicillins Hives     Aspirin      325mg      Colon Care       REVIEW OF SYSTEMS  As above in the HPI, o/w complete 12-point ROS was negative.    PHYSICAL EXAM  BP (!) 145/74 (BP Location: Right arm, Patient Position: Sitting, Cuff Size: Adult Large)   Pulse 109   Temp 98.9  F (37.2  C) (Oral)   Resp 22   Ht 1.676 m (5' 5.98\")   Wt 91.5 kg (201 lb 12.8 oz)   SpO2 " 92%   BMI 32.59 kg/m    GEN: NAD  HEENT: EOMI, no icterus, injection or pallor  EXT: no edema  NEURO: alert    LABORATORY AND IMAGING STUDIES   12/01/22 10:18 12/01/22 10:19   Sodium  140   Potassium  3.7   Chloride  99   Carbon Dioxide (CO2)  30 (H)   Urea Nitrogen  6.2 (L)   Creatinine  0.48 (L)   GFR Estimate  >90   Calcium  9.1   Anion Gap  11   Albumin  4.0   Protein Total  7.0   Alkaline Phosphatase  109 (H)   ALT  <5 (L)   AST  14   Bilirubin Total  0.3   Glucose  215 (H)   WBC 8.4    Hemoglobin 14.9    Hematocrit 47.6 (H)    Platelet Count 191    RBC Count 5.20    MCV 92    MCH 28.7    MCHC 31.3 (L)    RDW 13.1    % Neutrophils 64    % Lymphocytes 24    % Monocytes 7    % Eosinophils 4    % Basophils 1    Absolute Basophils 0.0    Absolute Eosinophils 0.3    Absolute Immature Granulocytes 0.0    Absolute Lymphocytes 2.0    Absolute Monocytes 0.6    % Immature Granulocytes 0    Absolute Neutrophils 5.4    Absolute NRBCs 0.0    NRBCs per 100 WBC 0      Labs were independently reviewed and interpreted by me    EXAMINATION: CT CHEST W/O CONTRAST, 12/1/2022 10:16 AM     CLINICAL HISTORY: restage h/o lung adenocarcinoma R lung s/p resection  and then recurrence s/p SBRT in 2020. New peripheral R lung nodule  increasing, mildly FDG avid on PET/CT, re-evaluate; Malignant neoplasm  of lower lobe of right lung (H)     COMPARISON: PET/CT 10/5/2022. Chest CT 8/31/2022.     TECHNIQUE: CT imaging obtained through the chest without contrast.  Coronal and axial MIP reformatted images obtained.      CONTRAST:  none.     FINDINGS:     Mediastinum: No cardiomegaly. No pericardial effusion. Normal caliber  thoracic aorta and main pulmonary trunk. No suspicious  lymphadenopathy.     Lungs/pleura: Prior right lower lobectomy. Unchanged irregularity of  the shaped spiculated nodules in the right lung apex and posterior  right upper lobe, the largest in the posterior right upper lobe on  series 4 image 91 measures 24 x 14 mm.  There is associated pleural  tethering and pleural thickening. Reportedly no suspicious FDG uptake  on PET/CT.     Decrease irregular reticulation associated with an area of  bronchiectasis and mucous plugging in the right upper lobe on series 4  image 107. Unchanged mucous plugging and tree-in-bud nodularity in the  posterior left upper lobe on series 4 image 123.     Again seen confluent centrilobular emphysema.     Upper abdomen: Evaluation of the upper abdomen is limited and without  intravenous contrast.     Bones/soft tissues: No destructive bony lesions. Soft tissues are  unremarkable.                                                                      IMPRESSION:  1. Unchanged non-FDG avid irregularly spiculated right upper lobe  nodule, given increasing density over previous chest CTs, remains  suspicious for recurrent neoplasm versus evolving treatment changes.  2. Unchanged additional smaller irregular spiculated nodules or mucous  plugging in the right upper lung.  3. Persistent infectious/inflammatory changes, mildly decreased in the  right upper lobe.  4. Unchanged mucous plugging and tree-in-bud nodularity in the  posterior left upper lobe       I have personally reviewed the examination and initial interpretation  and I agree with the findings.     TAVON CAMARENA MD     Imaging was personally reviewed and interpreted by me as above     Sincerely,        Zarina Leung MD

## 2022-12-01 NOTE — TELEPHONE ENCOUNTER
Reason for Call:  Appointment Request    Patient requesting this type of appt: Chronic Diease Management/Medication/Follow-Up diabetes est care    Requested provider: any internal med    Reason patient unable to be scheduled: Not within requested timeframe    When does patient want to be seen/preferred time: 1-2 weeks    Comments: patient wondering if she could be worked in [employer]    Okay to leave a detailed message?: Yes at Home number on file [phone] (home)    Call taken on 12/1/2022 at 3:11 PM by Rani Neumann

## 2022-12-01 NOTE — NURSING NOTE
"Oncology Rooming Note    December 1, 2022 12:11 PM   Jenn Aparicio is a 67 year old female who presents for:    Chief Complaint   Patient presents with     Oncology Clinic Visit     RETURN - LUNG CANCER     Initial Vitals: BP (!) 145/74 (BP Location: Right arm, Patient Position: Sitting, Cuff Size: Adult Large)   Pulse 109   Temp 98.9  F (37.2  C) (Oral)   Resp 22   Ht 1.676 m (5' 5.98\")   Wt 91.5 kg (201 lb 12.8 oz)   SpO2 92%   BMI 32.59 kg/m   Estimated body mass index is 32.59 kg/m  as calculated from the following:    Height as of this encounter: 1.676 m (5' 5.98\").    Weight as of this encounter: 91.5 kg (201 lb 12.8 oz). Body surface area is 2.06 meters squared.  Worst Pain (10) Comment: Data Unavailable   No LMP recorded. Patient is postmenopausal.  Allergies reviewed: Yes  Medications reviewed: Yes    Medications: Medication refills not needed today.  Pharmacy name entered into SQLstream:    [id number] - [place], [place] - [address] [address] [place] [address] PHARMACY MAIL DELIVERY - [address]  [facility] #[id number] - [address] PHARMACY - [place], [place] - [facility]AC[facility] - [place], [place] - [address] [place] [address] AVE [address]  [facility] - [facility], [place] - [address] [facility]    Clinical concerns: Neuropathy, pain, and cramping in feet. Also notes that she dropped off paperwork for the [facility] for nutrition and would like to know status.        Jessica Rajput, [facility]            "

## 2022-12-08 ENCOUNTER — ALLIED HEALTH/NURSE VISIT (OUTPATIENT)
Dept: EDUCATION SERVICES | Facility: CLINIC | Age: 67
End: 2022-12-08
Attending: INTERNAL MEDICINE
Payer: COMMERCIAL

## 2022-12-08 DIAGNOSIS — E11.42 TYPE 2 DIABETES MELLITUS WITH DIABETIC POLYNEUROPATHY, WITHOUT LONG-TERM CURRENT USE OF INSULIN (H): ICD-10-CM

## 2022-12-08 PROCEDURE — G0108 DIAB MANAGE TRN  PER INDIV: HCPCS

## 2022-12-08 NOTE — PROGRESS NOTES
DIABETES SELF MANAGEMENT EDUCATION:   Jenn Aparicio presents today for education related to Type 2 diabetes.    She is accompanied by self  Referring Provider: oncology    DIABETES RELATED CONCERNS/COMMENTS: neuropathy in hands and feet  Patient's emotional response to diabetes: expresses readiness to learn and concern for health and well-being    Past Diabetes Education: Yes  Support system: family  Living Situation: lives alone, just moved   Occupation: retired    ASSESSMENT:  Patient Problem List and Medication List reviewed for relevant medical history, current medical status, and diabetes risk factors.    Current Diabetes Management per Patient:  Glipizide 10 mg daily--does not miss doses, was off it for 10 days due to a recent move  At risk for hypoglycemia? Yes , Symptoms: Shaky and Confusion and Frequency: once a week  BG meter: lost meter  Patient glucose self monitoring as follows: has checked with blood sugars  BG results:  per memory    Patient's most recent A1C:   Lab Results   Component Value Date    A1C 9.8 08/31/2022    A1C 8.4 05/20/2022    A1C 8.4 01/27/2022    A1C 8.3 01/11/2022    A1C 7.0 02/24/2021    A1C 6.8 09/08/2020    A1C 7.1 12/01/2015         Nutrition:  Breakfast: (9-10a)  Or skips coffee, cream and raw sugar, skips  Snack:  Lunch:()  Snack:   Dinner: (5-8p) Subway tuna 6 inch, 12 oz Coke, cookie  Snack:     Appetite has been going down over last year, started supplementing with Ensure but has stopped using them    Physical Activity:     minimal exercise, using walking    Diabetes Complications:  Numbness in hands or feet    Diabetes knowledge and skills assessment:     Patient is knowledgeable in diabetes management concepts related to: Taking Medication  Barriers to Learning Assessment: No Barriers identified    Based on learning assessment above, most appropriate setting for further diabetes education would be: Individual setting.    INTERVENTION:   Education provided today  on:  Jenn knows her blood sugar is too high.  She has no data with her today so cannot assess that any deeper other than elevated a1c.  (Last one 9.8% 8/31/22)    We talked about options to get her blood sugar down and she is interested in trying an SGLT-2 Inhibitor.  We discussed the way the drug works and potential side effects.  Will reach out to Dr. Castro about getting an order    As blood sugar data would be helpful, we found the Marti 2 yadira in her phone.  We started a sample sensor which was placed in her left upper arm without difficulty.  The lot number is [id number] and SN [id number] Exp 5/31/23.    Pt verbalized understanding of concepts discussed and recommendations provided today.       Education Materials Provided:  Marti 2 sensor tip sheet    PLAN:  Wear the Marti 2 sensor for two weeks and return  See Dr. Castro as you are able    FOLLOW-UP:  12/22 11 am  Time Spent: 60 minutes  Encounter Type: Individual    Any diabetes medication dose changes were made via the CDE Protocol and Collaborative Practice Agreement with  Physicians.

## 2022-12-08 NOTE — PATIENT INSTRUCTIONS
We discussed adding a SGLT 2 inhibitor to your medication regimen.  It is a pill you will take once a day.  Keep yourself hydrated and be sure you clean your skin off well after urination.  This will help avoid yeast infections related to this drug.  Return December 22 11 am in person        Parts of your Marti Sensor        Sensor and reader.                                     Cell phone yesi and sensor.    The sensor is a white disc that you typically wear on the back of your upper arm.  The reader is a device to scan the meter with.  An yesi for your cell phone may take the place of the reader.  ([employer] and [employer])      1. Plan to wear your Marti sensor for 14-days.  The reader/yesi will tell you when your sensor session is ending.  A one month supply is two sensors.    2. There is a one-hour warm up period each time you insert a new sensor.  You will not get any glucose data during this time.    3. Please scan your sensor at least every 8 hours.  Before breakfast, lunch, dinner and at bedtime.  The sensor updates every minute and can only hold 8 hours of blood glucose data.  If you go longer than 8 hours without scanning we will lose data.  When you scan it, you are emptying it out to make room for more data.      4. When your glucose result is on the screen of the reader you will see a pencil in the upper right corner of the screen.  If you press the pencil it will take you to a screen where you can check boxes for insulin and or food.  This will allow your healthcare team to see when the food and/or insulin go in.     5. Yesi users can hook to our clinic portal using the code: [id number].  You cannot hook to our portal if you use a reader but you can upload your reader to edo.    6. If you have any issues with keeping the Marti sensor on, please let us know.  There are several products that can be used to help it stay in place. This tends to be more of an issue during the summer months.     7.  If you have a  "skin reaction related to the Marti tape, let us know.  There are some products that can be used under that can reduce the reaction.    8.  Important:  The Marti sensor is reading the glucose in the interstitial fluid.  Your blood glucose meter is reading the glucose in the bloodstream.  These two values often mirror each other.  However, after you eat glucose goes to your bloodstream first and then takes an additional 20-30 minutes to get out to the interstitial fluid where the sensor can read it.  This means after treating a low (with food) it will take the sensor longer to recognize that you have recovered.  Do not eat until the sensor value comes back up, this will cause you to rebound high.  Do a fingerstick 15 minutes after treating a low to check recovery.    9. Compression lows: If you lay on the sensor you may prevent the interstitial fluid from getting to the sensor.  The sensor may read this as a low blood sugar.  The glucose will typically come right back up if you reposition yourself.  If you are unsure if you are low, do a fingerstick.    10. You will get some notice when your sensor session is ending.  There will be messages that give you a countdown until your sensor ends.  When you see the \"change sensor now\" message remove the old sensor.  It peels off like a Band-Aid and insert a new sensor.      11. Marti sensors are typically covered by your pharmacy benefit.  There are often coupons available on-line for people who do not have coverage.  Medicare covers them as durable medical equipment and they must be ordered through a company who can bill that way.  [facility] or [employer] or [email or web address] are examples of companies who can bill this way.   "

## 2022-12-12 ENCOUNTER — PATIENT OUTREACH (OUTPATIENT)
Dept: ONCOLOGY | Facility: CLINIC | Age: 67
End: 2022-12-12

## 2022-12-12 DIAGNOSIS — E11.42 TYPE 2 DIABETES MELLITUS WITH DIABETIC POLYNEUROPATHY, WITHOUT LONG-TERM CURRENT USE OF INSULIN (H): Primary | ICD-10-CM

## 2022-12-12 NOTE — PROGRESS NOTES
[facility]/Voicemail    Clinical Data: Care Coordinator Outreach    Outreach attempted x 1.  Left message on patient's voicemail with call back information and requested return call.    Plan: Care Coordinator will do no further outreaches at this time.    Called to let the patient know that Dr. Leung discussed her DM management with Dr. Shabazz who recommended starting Jardiance. Dr[facility] I encouraged patient to call back with questions. We will get follow up labs in a month prior to Dr. Shabazz appointment. She also has follow up with Diabetes Educator, Nithya, RN next Thursday (12/22).    Rodrigo Armstrong DNP, RN, OCN  RN Care Coordinator  [facility]

## 2022-12-13 ENCOUNTER — TELEPHONE (OUTPATIENT)
Dept: EDUCATION SERVICES | Facility: CLINIC | Age: 67
End: 2022-12-13

## 2022-12-13 NOTE — TELEPHONE ENCOUNTER
[facility] Call Center    Phone Message    May a detailed message be left on voicemail: yes     Reason for Call: Other: Patient requested to leave a message for Nithya Solange. She states that her diabetes medical forms are being faxed back to Nithya and that she needs to include high protein on the form. Please follow up. Thank you.    Action Taken: Other: Endo    Travel Screening: Not Applicable

## 2022-12-15 NOTE — TELEPHONE ENCOUNTER
Phone call to Jenn to let her know that the Jardiance was sent in by her doctor.  She states she will pick it up today.    She states things are going well on Marti.  She is learning a lot about her blood sugar.  We have follow up in place next week.    She states she has been on a low carb, high protein diet in the past.  Would like to get a form completed for [facility].  Forms completed and faxed back to [facility].  Nithya Narvaez RN,CDE

## 2022-12-22 ENCOUNTER — VIRTUAL VISIT (OUTPATIENT)
Dept: EDUCATION SERVICES | Facility: CLINIC | Age: 67
End: 2022-12-22
Payer: COMMERCIAL

## 2022-12-22 DIAGNOSIS — E11.42 TYPE 2 DIABETES MELLITUS WITH DIABETIC POLYNEUROPATHY, WITHOUT LONG-TERM CURRENT USE OF INSULIN (H): Primary | ICD-10-CM

## 2022-12-22 PROCEDURE — G0108 DIAB MANAGE TRN  PER INDIV: HCPCS

## 2022-12-22 NOTE — PROGRESS NOTES
DIABETES SELF MANAGEMENT EDUCATION:   Jenn Aparicio presents today for education related to Type 2 diabetes.    She is accompanied by self  Referring Provider: Felisha Castro MD    DIABETES RELATED CONCERNS/COMMENTS: neuropathy in hands and feet  Patient's emotional response to diabetes: expresses readiness to learn and concern for health and well-being    Past Diabetes Education: Yes  Support system: family  Living Situation: lives alone, just moved   Occupation: retired    ASSESSMENT:  Patient Problem List and Medication List reviewed for relevant medical history, current medical status, and diabetes risk factors.    Current Diabetes Management per Patient:  Glipizide 10 mg daily, Jardiance 10 daily  At risk for hypoglycemia? Yes , Symptoms: Shaky and Confusion and Frequency: once a week  BG meter: Marti 2 sensors for two weeks  Patient glucose self monitoring as follows: has checked with blood sugars  BG results:           Patient's most recent A1C:   Lab Results   Component Value Date    A1C 9.8 08/31/2022    A1C 8.4 05/20/2022    A1C 8.4 01/27/2022    A1C 8.3 01/11/2022    A1C 7.0 02/24/2021    A1C 6.8 09/08/2020    A1C 7.1 12/01/2015     Nutrition:  Breakfast: (9-10a)  Or skips coffee, cream and raw sugar, skips  Snack:  Lunch:()  Snack:   Dinner: (5-8p) Subway tuna 6 inch, 12 oz Coke, cookie  Snack:     Appetite has been going down over last year, started supplementing with Ensure but has stopped using them    Physical Activity:     minimal exercise, using walking    Diabetes Complications:  Numbness in hands or feet    Diabetes knowledge and skills assessment:     Patient is knowledgeable in diabetes management concepts related to: Taking Medication  Barriers to Learning Assessment: No Barriers identified    Based on learning assessment above, most appropriate setting for further diabetes education would be: Individual setting.    INTERVENTION:   Education provided today on:  We discussed her [place]  results.  They look better than her a1c suggests.  She did change her eating when she saw how high she is spiking with meals.  Unfortunately, the sensor is not covered for her on an ongoing basis because she is not on insulin.  A Marti Pro would be covered monthly.  We can do this before her doctors appts so there is data for those visits.    She is taking Jardiance and has noticed that it has dropped her blood sugar.  She reports no side effects.      She is establishing care with a primary at [facility] on 1/10 and has endo follow up 1/23/23.    PLAN:  1. Take your medication as directed.  2. Test blood sugar 1-2 times per day  3. Glucerna for meal replacement if no appetite    FOLLOW-UP:  1/12/23 11 am  Time Spent: 60 minutes  Encounter Type: Individual    Any diabetes medication dose changes were made via the CDE Protocol and Collaborative Practice Agreement with  Physicians.

## 2023-01-01 ENCOUNTER — APPOINTMENT (OUTPATIENT)
Dept: GENERAL RADIOLOGY | Facility: CLINIC | Age: 68
DRG: 190 | End: 2023-01-01
Attending: EMERGENCY MEDICINE
Payer: COMMERCIAL

## 2023-01-01 ENCOUNTER — HOSPITAL ENCOUNTER (INPATIENT)
Facility: CLINIC | Age: 68
LOS: 3 days | Discharge: HOME | DRG: 190 | End: 2023-01-04
Attending: EMERGENCY MEDICINE | Admitting: STUDENT IN AN ORGANIZED HEALTH CARE EDUCATION/TRAINING PROGRAM
Payer: COMMERCIAL

## 2023-01-01 ENCOUNTER — APPOINTMENT (OUTPATIENT)
Dept: CT IMAGING | Facility: CLINIC | Age: 68
DRG: 190 | End: 2023-01-01
Attending: PHYSICIAN ASSISTANT
Payer: COMMERCIAL

## 2023-01-01 DIAGNOSIS — E11.42 DIABETIC POLYNEUROPATHY ASSOCIATED WITH TYPE 2 DIABETES MELLITUS (H): ICD-10-CM

## 2023-01-01 DIAGNOSIS — F17.210 CIGARETTE SMOKER: ICD-10-CM

## 2023-01-01 DIAGNOSIS — J96.21 ACUTE AND CHRONIC RESPIRATORY FAILURE WITH HYPOXIA (H): Primary | ICD-10-CM

## 2023-01-01 DIAGNOSIS — Z11.52 ENCOUNTER FOR SCREENING LABORATORY TESTING FOR SEVERE ACUTE RESPIRATORY SYNDROME CORONAVIRUS 2 (SARS-COV-2): ICD-10-CM

## 2023-01-01 DIAGNOSIS — J44.1 COPD EXACERBATION (H): ICD-10-CM

## 2023-01-01 PROBLEM — E11.9 TYPE 2 DIABETES MELLITUS WITHOUT COMPLICATION, WITHOUT LONG-TERM CURRENT USE OF INSULIN (H): Status: ACTIVE | Noted: 2019-01-09

## 2023-01-01 PROBLEM — K21.9 GASTROESOPHAGEAL REFLUX DISEASE WITHOUT ESOPHAGITIS: Status: ACTIVE | Noted: 2022-05-24

## 2023-01-01 PROBLEM — E11.40 DIABETIC NEUROPATHY (H): Status: ACTIVE | Noted: 2021-11-18

## 2023-01-01 LAB
ALBUMIN SERPL BCG-MCNC: 4.1 G/DL (ref 3.5–5.2)
ALBUMIN SERPL BCG-MCNC: 4.2 G/DL (ref 3.5–5.2)
ALBUMIN UR-MCNC: 20 MG/DL
ALP SERPL-CCNC: 101 U/L (ref 35–104)
ALP SERPL-CCNC: 98 U/L (ref 35–104)
ALT SERPL W P-5'-P-CCNC: 7 U/L (ref 10–35)
ALT SERPL W P-5'-P-CCNC: <5 U/L (ref 10–35)
ANION GAP SERPL CALCULATED.3IONS-SCNC: 17 MMOL/L (ref 7–15)
ANION GAP SERPL CALCULATED.3IONS-SCNC: 17 MMOL/L (ref 7–15)
APPEARANCE UR: CLEAR
AST SERPL W P-5'-P-CCNC: 19 U/L (ref 10–35)
AST SERPL W P-5'-P-CCNC: 21 U/L (ref 10–35)
ATRIAL RATE - MUSE: 100 BPM
BASOPHILS # BLD AUTO: 0.1 10E3/UL (ref 0–0.2)
BASOPHILS NFR BLD AUTO: 1 %
BILIRUB SERPL-MCNC: 0.2 MG/DL
BILIRUB SERPL-MCNC: 0.3 MG/DL
BILIRUB UR QL STRIP: NEGATIVE
BUN SERPL-MCNC: 6.7 MG/DL (ref 8–23)
BUN SERPL-MCNC: 7.3 MG/DL (ref 8–23)
CALCIUM SERPL-MCNC: 9.1 MG/DL (ref 8.8–10.2)
CALCIUM SERPL-MCNC: 9.1 MG/DL (ref 8.8–10.2)
CHLORIDE SERPL-SCNC: 96 MMOL/L (ref 98–107)
CHLORIDE SERPL-SCNC: 98 MMOL/L (ref 98–107)
COLOR UR AUTO: ABNORMAL
CREAT SERPL-MCNC: 0.45 MG/DL (ref 0.51–0.95)
CREAT SERPL-MCNC: 0.45 MG/DL (ref 0.51–0.95)
DEPRECATED HCO3 PLAS-SCNC: 22 MMOL/L (ref 22–29)
DEPRECATED HCO3 PLAS-SCNC: 26 MMOL/L (ref 22–29)
DIASTOLIC BLOOD PRESSURE - MUSE: NORMAL MMHG
EOSINOPHIL # BLD AUTO: 0.3 10E3/UL (ref 0–0.7)
EOSINOPHIL NFR BLD AUTO: 4 %
ERYTHROCYTE [DISTWIDTH] IN BLOOD BY AUTOMATED COUNT: 13.1 % (ref 10–15)
FLUAV RNA SPEC QL NAA+PROBE: NEGATIVE
FLUBV RNA RESP QL NAA+PROBE: NEGATIVE
GFR SERPL CREATININE-BSD FRML MDRD: >90 ML/MIN/1.73M2
GFR SERPL CREATININE-BSD FRML MDRD: >90 ML/MIN/1.73M2
GLUCOSE BLDC GLUCOMTR-MCNC: 186 MG/DL (ref 70–99)
GLUCOSE BLDC GLUCOMTR-MCNC: 222 MG/DL (ref 70–99)
GLUCOSE BLDC GLUCOMTR-MCNC: 257 MG/DL (ref 70–99)
GLUCOSE SERPL-MCNC: 205 MG/DL (ref 70–99)
GLUCOSE SERPL-MCNC: 219 MG/DL (ref 70–99)
GLUCOSE UR STRIP-MCNC: >=1000 MG/DL
HBA1C MFR BLD: 9.9 %
HCT VFR BLD AUTO: 51.5 % (ref 35–47)
HGB BLD-MCNC: 15.3 G/DL (ref 11.7–15.7)
HGB UR QL STRIP: NEGATIVE
HOLD SPECIMEN: NORMAL
IMM GRANULOCYTES # BLD: 0.1 10E3/UL
IMM GRANULOCYTES NFR BLD: 1 %
INTERPRETATION ECG - MUSE: NORMAL
KETONES UR STRIP-MCNC: 40 MG/DL
LACTATE SERPL-SCNC: 0.8 MMOL/L (ref 0.7–2)
LACTATE SERPL-SCNC: 1.4 MMOL/L (ref 0.7–2)
LEUKOCYTE ESTERASE UR QL STRIP: NEGATIVE
LYMPHOCYTES # BLD AUTO: 2.7 10E3/UL (ref 0.8–5.3)
LYMPHOCYTES NFR BLD AUTO: 32 %
MCH RBC QN AUTO: 27.7 PG (ref 26.5–33)
MCHC RBC AUTO-ENTMCNC: 29.7 G/DL (ref 31.5–36.5)
MCV RBC AUTO: 93 FL (ref 78–100)
MONOCYTES # BLD AUTO: 0.7 10E3/UL (ref 0–1.3)
MONOCYTES NFR BLD AUTO: 8 %
NEUTROPHILS # BLD AUTO: 4.7 10E3/UL (ref 1.6–8.3)
NEUTROPHILS NFR BLD AUTO: 54 %
NITRATE UR QL: NEGATIVE
NRBC # BLD AUTO: 0 10E3/UL
NRBC BLD AUTO-RTO: 0 /100
P AXIS - MUSE: 85 DEGREES
PH UR STRIP: 5.5 [PH] (ref 5–7)
PLATELET # BLD AUTO: 242 10E3/UL (ref 150–450)
POTASSIUM SERPL-SCNC: 4.5 MMOL/L (ref 3.4–5.3)
POTASSIUM SERPL-SCNC: 4.6 MMOL/L (ref 3.4–5.3)
PR INTERVAL - MUSE: 152 MS
PROCALCITONIN SERPL IA-MCNC: 0.03 NG/ML
PROT SERPL-MCNC: 7.1 G/DL (ref 6.4–8.3)
PROT SERPL-MCNC: 7.2 G/DL (ref 6.4–8.3)
QRS DURATION - MUSE: 80 MS
QT - MUSE: 362 MS
QTC - MUSE: 466 MS
R AXIS - MUSE: 66 DEGREES
RBC # BLD AUTO: 5.53 10E6/UL (ref 3.8–5.2)
RBC URINE: 0 /HPF
RSV RNA SPEC NAA+PROBE: NEGATIVE
SARS-COV-2 RNA RESP QL NAA+PROBE: NEGATIVE
SODIUM SERPL-SCNC: 137 MMOL/L (ref 136–145)
SODIUM SERPL-SCNC: 139 MMOL/L (ref 136–145)
SP GR UR STRIP: 1.01 (ref 1–1.03)
SQUAMOUS EPITHELIAL: <1 /HPF
SYSTOLIC BLOOD PRESSURE - MUSE: NORMAL MMHG
T AXIS - MUSE: 74 DEGREES
TROPONIN T SERPL HS-MCNC: 6 NG/L
UROBILINOGEN UR STRIP-MCNC: NORMAL MG/DL
VENTRICULAR RATE- MUSE: 100 BPM
WBC # BLD AUTO: 8.6 10E3/UL (ref 4–11)
WBC URINE: <1 /HPF

## 2023-01-01 PROCEDURE — 120N000003 HC R&B IMCU UMMC

## 2023-01-01 PROCEDURE — 87581 M.PNEUMON DNA AMP PROBE: CPT | Performed by: PHYSICIAN ASSISTANT

## 2023-01-01 PROCEDURE — 250N000011 HC RX IP 250 OP 636: Performed by: PHYSICIAN ASSISTANT

## 2023-01-01 PROCEDURE — 250N000012 HC RX MED GY IP 250 OP 636 PS 637: Performed by: PHYSICIAN ASSISTANT

## 2023-01-01 PROCEDURE — 71275 CT ANGIOGRAPHY CHEST: CPT | Mod: 26 | Performed by: RADIOLOGY

## 2023-01-01 PROCEDURE — 84484 ASSAY OF TROPONIN QUANT: CPT | Performed by: EMERGENCY MEDICINE

## 2023-01-01 PROCEDURE — 99207 PR APP CREDIT; MD BILLING SHARED VISIT: CPT | Performed by: PHYSICIAN ASSISTANT

## 2023-01-01 PROCEDURE — 94640 AIRWAY INHALATION TREATMENT: CPT | Mod: 76

## 2023-01-01 PROCEDURE — 84145 PROCALCITONIN (PCT): CPT | Performed by: PHYSICIAN ASSISTANT

## 2023-01-01 PROCEDURE — 94640 AIRWAY INHALATION TREATMENT: CPT

## 2023-01-01 PROCEDURE — 84155 ASSAY OF PROTEIN SERUM: CPT | Performed by: EMERGENCY MEDICINE

## 2023-01-01 PROCEDURE — 83036 HEMOGLOBIN GLYCOSYLATED A1C: CPT | Performed by: PHYSICIAN ASSISTANT

## 2023-01-01 PROCEDURE — 93010 ELECTROCARDIOGRAM REPORT: CPT | Performed by: EMERGENCY MEDICINE

## 2023-01-01 PROCEDURE — 87040 BLOOD CULTURE FOR BACTERIA: CPT | Performed by: EMERGENCY MEDICINE

## 2023-01-01 PROCEDURE — 87086 URINE CULTURE/COLONY COUNT: CPT | Performed by: EMERGENCY MEDICINE

## 2023-01-01 PROCEDURE — 250N000011 HC RX IP 250 OP 636: Performed by: EMERGENCY MEDICINE

## 2023-01-01 PROCEDURE — 99223 1ST HOSP IP/OBS HIGH 75: CPT | Mod: AI | Performed by: STUDENT IN AN ORGANIZED HEALTH CARE EDUCATION/TRAINING PROGRAM

## 2023-01-01 PROCEDURE — 99291 CRITICAL CARE FIRST HOUR: CPT | Mod: CS | Performed by: EMERGENCY MEDICINE

## 2023-01-01 PROCEDURE — 250N000013 HC RX MED GY IP 250 OP 250 PS 637: Performed by: PHYSICIAN ASSISTANT

## 2023-01-01 PROCEDURE — 36415 COLL VENOUS BLD VENIPUNCTURE: CPT | Performed by: EMERGENCY MEDICINE

## 2023-01-01 PROCEDURE — 83605 ASSAY OF LACTIC ACID: CPT | Performed by: EMERGENCY MEDICINE

## 2023-01-01 PROCEDURE — 71045 X-RAY EXAM CHEST 1 VIEW: CPT

## 2023-01-01 PROCEDURE — 85025 COMPLETE CBC W/AUTO DIFF WBC: CPT | Performed by: EMERGENCY MEDICINE

## 2023-01-01 PROCEDURE — 250N000013 HC RX MED GY IP 250 OP 250 PS 637: Performed by: STUDENT IN AN ORGANIZED HEALTH CARE EDUCATION/TRAINING PROGRAM

## 2023-01-01 PROCEDURE — 94660 CPAP INITIATION&MGMT: CPT

## 2023-01-01 PROCEDURE — 81001 URINALYSIS AUTO W/SCOPE: CPT | Performed by: EMERGENCY MEDICINE

## 2023-01-01 PROCEDURE — 99285 EMERGENCY DEPT VISIT HI MDM: CPT | Mod: CS,25

## 2023-01-01 PROCEDURE — 5A09357 ASSISTANCE WITH RESPIRATORY VENTILATION, LESS THAN 24 CONSECUTIVE HOURS, CONTINUOUS POSITIVE AIRWAY PRESSURE: ICD-10-PCS | Performed by: EMERGENCY MEDICINE

## 2023-01-01 PROCEDURE — 36415 COLL VENOUS BLD VENIPUNCTURE: CPT | Performed by: STUDENT IN AN ORGANIZED HEALTH CARE EDUCATION/TRAINING PROGRAM

## 2023-01-01 PROCEDURE — 71275 CT ANGIOGRAPHY CHEST: CPT

## 2023-01-01 PROCEDURE — 999N000157 HC STATISTIC RCP TIME EA 10 MIN

## 2023-01-01 PROCEDURE — 71045 X-RAY EXAM CHEST 1 VIEW: CPT | Mod: 26 | Performed by: RADIOLOGY

## 2023-01-01 PROCEDURE — 250N000011 HC RX IP 250 OP 636: Performed by: STUDENT IN AN ORGANIZED HEALTH CARE EDUCATION/TRAINING PROGRAM

## 2023-01-01 PROCEDURE — 250N000009 HC RX 250: Performed by: EMERGENCY MEDICINE

## 2023-01-01 PROCEDURE — 93005 ELECTROCARDIOGRAM TRACING: CPT

## 2023-01-01 PROCEDURE — 250N000009 HC RX 250: Performed by: PHYSICIAN ASSISTANT

## 2023-01-01 PROCEDURE — 80053 COMPREHEN METABOLIC PANEL: CPT | Performed by: EMERGENCY MEDICINE

## 2023-01-01 PROCEDURE — 87637 SARSCOV2&INF A&B&RSV AMP PRB: CPT | Performed by: EMERGENCY MEDICINE

## 2023-01-01 PROCEDURE — C9803 HOPD COVID-19 SPEC COLLECT: HCPCS

## 2023-01-01 RX ORDER — ONDANSETRON 4 MG/1
4 TABLET, ORALLY DISINTEGRATING ORAL EVERY 6 HOURS PRN
Status: DISCONTINUED | OUTPATIENT
Start: 2023-01-01 | End: 2023-01-04 | Stop reason: HOSPADM

## 2023-01-01 RX ORDER — IPRATROPIUM BROMIDE AND ALBUTEROL SULFATE 2.5; .5 MG/3ML; MG/3ML
3 SOLUTION RESPIRATORY (INHALATION) ONCE
Status: COMPLETED | OUTPATIENT
Start: 2023-01-01 | End: 2023-01-01

## 2023-01-01 RX ORDER — MAGNESIUM SULFATE HEPTAHYDRATE 40 MG/ML
2 INJECTION, SOLUTION INTRAVENOUS ONCE
Status: COMPLETED | OUTPATIENT
Start: 2023-01-01 | End: 2023-01-01

## 2023-01-01 RX ORDER — LIDOCAINE 40 MG/G
CREAM TOPICAL
Status: DISCONTINUED | OUTPATIENT
Start: 2023-01-01 | End: 2023-01-04 | Stop reason: HOSPADM

## 2023-01-01 RX ORDER — MULTIPLE VITAMINS W/ MINERALS TAB 9MG-400MCG
1 TAB ORAL DAILY
Status: DISCONTINUED | OUTPATIENT
Start: 2023-01-01 | End: 2023-01-04 | Stop reason: HOSPADM

## 2023-01-01 RX ORDER — LEVOFLOXACIN 5 MG/ML
500 INJECTION, SOLUTION INTRAVENOUS EVERY 24 HOURS
Status: DISCONTINUED | OUTPATIENT
Start: 2023-01-01 | End: 2023-01-03

## 2023-01-01 RX ORDER — CYCLOBENZAPRINE HCL 5 MG
5 TABLET ORAL 3 TIMES DAILY
Status: DISCONTINUED | OUTPATIENT
Start: 2023-01-01 | End: 2023-01-04 | Stop reason: HOSPADM

## 2023-01-01 RX ORDER — IPRATROPIUM BROMIDE AND ALBUTEROL SULFATE 2.5; .5 MG/3ML; MG/3ML
3 SOLUTION RESPIRATORY (INHALATION) ONCE
Status: DISCONTINUED | OUTPATIENT
Start: 2023-01-01 | End: 2023-01-01

## 2023-01-01 RX ORDER — ACETAMINOPHEN 325 MG/1
650 TABLET ORAL EVERY 6 HOURS PRN
Status: DISCONTINUED | OUTPATIENT
Start: 2023-01-01 | End: 2023-01-04 | Stop reason: HOSPADM

## 2023-01-01 RX ORDER — NICOTINE POLACRILEX 4 MG
15-30 LOZENGE BUCCAL
Status: DISCONTINUED | OUTPATIENT
Start: 2023-01-01 | End: 2023-01-04 | Stop reason: HOSPADM

## 2023-01-01 RX ORDER — FLUTICASONE PROPIONATE 50 MCG
1 SPRAY, SUSPENSION (ML) NASAL DAILY
Status: DISCONTINUED | OUTPATIENT
Start: 2023-01-01 | End: 2023-01-04 | Stop reason: HOSPADM

## 2023-01-01 RX ORDER — ALBUTEROL SULFATE 0.83 MG/ML
2.5 SOLUTION RESPIRATORY (INHALATION)
Status: DISCONTINUED | OUTPATIENT
Start: 2023-01-01 | End: 2023-01-04 | Stop reason: HOSPADM

## 2023-01-01 RX ORDER — PANTOPRAZOLE SODIUM 40 MG/1
40 TABLET, DELAYED RELEASE ORAL
Status: DISCONTINUED | OUTPATIENT
Start: 2023-01-01 | End: 2023-01-04 | Stop reason: HOSPADM

## 2023-01-01 RX ORDER — ONDANSETRON 2 MG/ML
4 INJECTION INTRAMUSCULAR; INTRAVENOUS EVERY 6 HOURS PRN
Status: DISCONTINUED | OUTPATIENT
Start: 2023-01-01 | End: 2023-01-04 | Stop reason: HOSPADM

## 2023-01-01 RX ORDER — METHYLPREDNISOLONE SODIUM SUCCINATE 125 MG/2ML
60 INJECTION, POWDER, LYOPHILIZED, FOR SOLUTION INTRAMUSCULAR; INTRAVENOUS EVERY 12 HOURS
Status: DISCONTINUED | OUTPATIENT
Start: 2023-01-01 | End: 2023-01-01

## 2023-01-01 RX ORDER — ENOXAPARIN SODIUM 100 MG/ML
40 INJECTION SUBCUTANEOUS EVERY 24 HOURS
Status: DISCONTINUED | OUTPATIENT
Start: 2023-01-01 | End: 2023-01-04 | Stop reason: HOSPADM

## 2023-01-01 RX ORDER — SODIUM CHLORIDE FOR INHALATION 3 %
3 VIAL, NEBULIZER (ML) INHALATION
Status: DISCONTINUED | OUTPATIENT
Start: 2023-01-01 | End: 2023-01-04 | Stop reason: HOSPADM

## 2023-01-01 RX ORDER — AMOXICILLIN 250 MG
2 CAPSULE ORAL 2 TIMES DAILY PRN
Status: DISCONTINUED | OUTPATIENT
Start: 2023-01-01 | End: 2023-01-04 | Stop reason: HOSPADM

## 2023-01-01 RX ORDER — ACETAMINOPHEN 650 MG/1
650 SUPPOSITORY RECTAL EVERY 6 HOURS PRN
Status: DISCONTINUED | OUTPATIENT
Start: 2023-01-01 | End: 2023-01-04 | Stop reason: HOSPADM

## 2023-01-01 RX ORDER — IPRATROPIUM BROMIDE AND ALBUTEROL SULFATE 2.5; .5 MG/3ML; MG/3ML
3 SOLUTION RESPIRATORY (INHALATION)
Status: DISCONTINUED | OUTPATIENT
Start: 2023-01-01 | End: 2023-01-04 | Stop reason: HOSPADM

## 2023-01-01 RX ORDER — IOPAMIDOL 755 MG/ML
68 INJECTION, SOLUTION INTRAVASCULAR ONCE
Status: COMPLETED | OUTPATIENT
Start: 2023-01-01 | End: 2023-01-01

## 2023-01-01 RX ORDER — ASPIRIN 81 MG/1
81 TABLET ORAL DAILY
Status: DISCONTINUED | OUTPATIENT
Start: 2023-01-01 | End: 2023-01-04 | Stop reason: HOSPADM

## 2023-01-01 RX ORDER — METHYLPREDNISOLONE SODIUM SUCCINATE 125 MG/2ML
125 INJECTION, POWDER, LYOPHILIZED, FOR SOLUTION INTRAMUSCULAR; INTRAVENOUS ONCE
Status: COMPLETED | OUTPATIENT
Start: 2023-01-01 | End: 2023-01-01

## 2023-01-01 RX ORDER — CETIRIZINE HYDROCHLORIDE 10 MG/1
10 TABLET ORAL DAILY
Status: DISCONTINUED | OUTPATIENT
Start: 2023-01-01 | End: 2023-01-04 | Stop reason: HOSPADM

## 2023-01-01 RX ORDER — METHYLPREDNISOLONE SODIUM SUCCINATE 125 MG/2ML
60 INJECTION, POWDER, LYOPHILIZED, FOR SOLUTION INTRAMUSCULAR; INTRAVENOUS EVERY 12 HOURS
Status: DISCONTINUED | OUTPATIENT
Start: 2023-01-01 | End: 2023-01-03

## 2023-01-01 RX ORDER — DEXTROSE MONOHYDRATE 25 G/50ML
25-50 INJECTION, SOLUTION INTRAVENOUS
Status: DISCONTINUED | OUTPATIENT
Start: 2023-01-01 | End: 2023-01-04 | Stop reason: HOSPADM

## 2023-01-01 RX ORDER — BENZONATATE 100 MG/1
200 CAPSULE ORAL 3 TIMES DAILY PRN
Status: DISCONTINUED | OUTPATIENT
Start: 2023-01-01 | End: 2023-01-04 | Stop reason: HOSPADM

## 2023-01-01 RX ORDER — AMOXICILLIN 250 MG
1 CAPSULE ORAL 2 TIMES DAILY PRN
Status: DISCONTINUED | OUTPATIENT
Start: 2023-01-01 | End: 2023-01-04 | Stop reason: HOSPADM

## 2023-01-01 RX ORDER — FLUTICASONE FUROATE AND VILANTEROL 200; 25 UG/1; UG/1
1 POWDER RESPIRATORY (INHALATION) DAILY
Status: DISCONTINUED | OUTPATIENT
Start: 2023-01-01 | End: 2023-01-04 | Stop reason: HOSPADM

## 2023-01-01 RX ORDER — AMLODIPINE BESYLATE 10 MG/1
10 TABLET ORAL DAILY
Status: DISCONTINUED | OUTPATIENT
Start: 2023-01-01 | End: 2023-01-04 | Stop reason: HOSPADM

## 2023-01-01 RX ADMIN — IPRATROPIUM BROMIDE AND ALBUTEROL SULFATE 3 ML: 2.5; .5 SOLUTION RESPIRATORY (INHALATION) at 13:05

## 2023-01-01 RX ADMIN — PANTOPRAZOLE SODIUM 40 MG: 40 TABLET, DELAYED RELEASE ORAL at 12:33

## 2023-01-01 RX ADMIN — POLYETHYLENE GLYCOL 400 AND PROPYLENE GLYCOL 1 DROP: 4; 3 SOLUTION/ DROPS OPHTHALMIC at 16:47

## 2023-01-01 RX ADMIN — INSULIN ASPART 2 UNITS: 100 INJECTION, SOLUTION INTRAVENOUS; SUBCUTANEOUS at 12:52

## 2023-01-01 RX ADMIN — KETOTIFEN FUMARATE 1 DROP: 0.35 SOLUTION/ DROPS OPHTHALMIC at 12:46

## 2023-01-01 RX ADMIN — POLYETHYLENE GLYCOL 400 AND PROPYLENE GLYCOL 1 DROP: 4; 3 SOLUTION/ DROPS OPHTHALMIC at 20:34

## 2023-01-01 RX ADMIN — IPRATROPIUM BROMIDE AND ALBUTEROL SULFATE 3 ML: 2.5; .5 SOLUTION RESPIRATORY (INHALATION) at 09:12

## 2023-01-01 RX ADMIN — CYCLOBENZAPRINE HYDROCHLORIDE 5 MG: 5 TABLET, FILM COATED ORAL at 20:29

## 2023-01-01 RX ADMIN — ENOXAPARIN SODIUM 40 MG: 40 INJECTION SUBCUTANEOUS at 12:34

## 2023-01-01 RX ADMIN — FLUTICASONE FUROATE AND VILANTEROL TRIFENATATE 1 PUFF: 200; 25 POWDER RESPIRATORY (INHALATION) at 12:45

## 2023-01-01 RX ADMIN — INSULIN ASPART 1 UNITS: 100 INJECTION, SOLUTION INTRAVENOUS; SUBCUTANEOUS at 17:54

## 2023-01-01 RX ADMIN — AMLODIPINE BESYLATE 10 MG: 10 TABLET ORAL at 12:34

## 2023-01-01 RX ADMIN — METHYLPREDNISOLONE SODIUM SUCCINATE 125 MG: 125 INJECTION, POWDER, FOR SOLUTION INTRAMUSCULAR; INTRAVENOUS at 05:50

## 2023-01-01 RX ADMIN — IPRATROPIUM BROMIDE AND ALBUTEROL SULFATE 3 ML: 2.5; .5 SOLUTION RESPIRATORY (INHALATION) at 20:02

## 2023-01-01 RX ADMIN — METHYLPREDNISOLONE SODIUM SUCCINATE 62.5 MG: 125 INJECTION, POWDER, FOR SOLUTION INTRAMUSCULAR; INTRAVENOUS at 20:32

## 2023-01-01 RX ADMIN — Medication 1 TABLET: at 12:34

## 2023-01-01 RX ADMIN — IOPAMIDOL 68 ML: 755 INJECTION, SOLUTION INTRAVENOUS at 11:20

## 2023-01-01 RX ADMIN — CYCLOBENZAPRINE HYDROCHLORIDE 5 MG: 5 TABLET, FILM COATED ORAL at 14:32

## 2023-01-01 RX ADMIN — PANTOPRAZOLE SODIUM 40 MG: 40 TABLET, DELAYED RELEASE ORAL at 16:46

## 2023-01-01 RX ADMIN — FLUTICASONE PROPIONATE 1 SPRAY: 50 SPRAY, METERED NASAL at 12:51

## 2023-01-01 RX ADMIN — MAGNESIUM SULFATE IN WATER 2 G: 40 INJECTION, SOLUTION INTRAVENOUS at 05:51

## 2023-01-01 RX ADMIN — LEVOFLOXACIN 500 MG: 5 INJECTION, SOLUTION INTRAVENOUS at 06:00

## 2023-01-01 RX ADMIN — IPRATROPIUM BROMIDE AND ALBUTEROL SULFATE 3 ML: 2.5; .5 SOLUTION RESPIRATORY (INHALATION) at 06:01

## 2023-01-01 RX ADMIN — UMECLIDINIUM 1 PUFF: 62.5 AEROSOL, POWDER ORAL at 12:46

## 2023-01-01 RX ADMIN — CETIRIZINE HYDROCHLORIDE 10 MG: 10 TABLET, FILM COATED ORAL at 12:52

## 2023-01-01 RX ADMIN — ASPIRIN 81 MG: 81 TABLET ORAL at 12:34

## 2023-01-01 RX ADMIN — POLYETHYLENE GLYCOL 400 AND PROPYLENE GLYCOL 1 DROP: 4; 3 SOLUTION/ DROPS OPHTHALMIC at 12:51

## 2023-01-01 RX ADMIN — EMPAGLIFLOZIN 10 MG: 10 TABLET, FILM COATED ORAL at 12:34

## 2023-01-01 ASSESSMENT — ACTIVITIES OF DAILY LIVING (ADL)
ADLS_ACUITY_SCORE: 35
WALKING_OR_CLIMBING_STAIRS_DIFFICULTY: YES
DRESSING/BATHING_DIFFICULTY: YES
WALKING_OR_CLIMBING_STAIRS: AMBULATION DIFFICULTY, REQUIRES EQUIPMENT
NUMBER_OF_TIMES_PATIENT_HAS_FALLEN_WITHIN_LAST_SIX_MONTHS: 2
ADLS_ACUITY_SCORE: 35
ADLS_ACUITY_SCORE: 35
TRANSFERRING: 0-->INDEPENDENT
VISION_MANAGEMENT: GLASSES
EQUIPMENT_CURRENTLY_USED_AT_HOME: WALKER, ROLLING
ADLS_ACUITY_SCORE: 35
ADLS_ACUITY_SCORE: 35
ADLS_ACUITY_SCORE: 32
DIFFICULTY_COMMUNICATING: NO
DRESS: 1-->ASSISTANCE (EQUIPMENT/PERSON) NEEDED
CONCENTRATING,_REMEMBERING_OR_MAKING_DECISIONS_DIFFICULTY: NO
ADLS_ACUITY_SCORE: 35
WEAR_GLASSES_OR_BLIND: YES
TOILETING_ISSUES: NO
TRANSFERRING: 0-->INDEPENDENT
BATHING: 1-->ASSISTANCE NEEDED
DRESSING/BATHING: BATHING DIFFICULTY, REQUIRES EQUIPMENT
HEARING_DIFFICULTY_OR_DEAF: NO
CHANGE_IN_FUNCTIONAL_STATUS_SINCE_ONSET_OF_CURRENT_ILLNESS/INJURY: NO
ADLS_ACUITY_SCORE: 35
DIFFICULTY_EATING/SWALLOWING: NO
DOING_ERRANDS_INDEPENDENTLY_DIFFICULTY: YES
DRESS: 1-->ASSISTANCE (EQUIPMENT/PERSON) NEEDED (NOT DEVELOPMENTALLY APPROPRIATE)
ADLS_ACUITY_SCORE: 35
ADLS_ACUITY_SCORE: 35
FALL_HISTORY_WITHIN_LAST_SIX_MONTHS: YES

## 2023-01-01 NOTE — ED TRIAGE NOTES
Patient brought in by ems.Patient has been having increased SOB over last few days. Patient has home O2. Patient was 70% on 3L NC O2. Patient placed on Bi Pap then to CPAP with improvement.Patient given Duoneb in route. Patient arrived on CPAP sats 100%. RT, RN, MD present upon arrival

## 2023-01-01 NOTE — H&P
[facility]    History and Physical - Hospitalist Service, GOLD TEAM 6       Date of Admission:  1/1/2023    Assessment & Plan      Jenn Aparicio is a 67 year old female with a pmh of DM2 with peripheral neuropathy, RLL adenocarcinoma s/p lobectomy in 2016, RUL NSCLC s/p SBRT (1/2020), HTN  COPD with chronic hypoxic respiratory failure requiring 3L at baseline who presents with COPD exacerbation and admitted on 1/1/2023.      #Severe COPD  #COPD exacerbation  # Acute on chronic hypoxic respiratory failure  # Lung nodules  - Will switch between optiflow and BiPAP for goal SpO2>88%  - At baseline she uses 3L nasal canula and sometimes turns it up for exertion  - Methylpred 60 mg bid with duoneb every 6 hours; along with addition albuterol nebs as needed   - Started on levaquin in ED for COPD exacerbation, will wait for labs results to decide if we can discontinue this   - hypertonic saline nebs as needed for secretion burden  - Respiratory pathogen panel and sputum culture   - blood cultures sent in ED and currently afebrile without leukocytosis   - follows with oncology and will have repeat CT chest in 2 months to monitor progression for nodules  - last saw pulmonary outpatient in 8/2022 and they recommended possible addition of roflumilast vs azithromycin to minimize further exacerbations; will likely discuss with them outpatient plan during admission  - she has home prednisone at home for exacerbations but isn't on it chronically  - If shortness of breath doesn't improve with treatment for COPD exacerbation consider PE work up.     #HTN  - resume prior to admission norvasc for goal SBP     #Type 2 Diabetes  # Peripheral neuropathy  - her peripheral neuropathy has been slowly worsening; she follows up with endocrine as an outpatient and we will work while inpatient to have adequate glycemic control  - resume prior to admission empagliflozin and with start sliding  scale, tomorrow add lantus if needed    # GERD   #Dysphagia  - resume prior to admission omeprazole  - follows outpatient with GI service       Diet:  Regular diet  DVT Prophylaxis: Enoxaparin (Lovenox) SQ and Pneumatic Compression Devices  Andrade Catheter: Not present  Lines: None     Cardiac Monitoring: None  Code Status:  Full    Clinically Significant Risk Factors Present on Admission                    # Non-Invasive mechanical ventilation: current O2 Device: BiPAP/CPAP  # Acute hypoxic respiratory failure: continue supplemental O2 as needed             Disposition Plan  Home with supplemental oxygen     Expected Discharge Date: 01/03/2023                The patient's care was discussed with the Attending Physician, Dr. Borja.    JOAO GARCÍA PA-C  Hospitalist Service, [address]  Securely message with [employer] (more info)  Text page via [email or web address] Paging/Directory   See signed in provider for up to date coverage information    ______________________________________________________________________    Chief Complaint   Shortness of breath    History is obtained from the patient    History of Present Illness   Jenn Aparicio is a 67 year old female with a pmh of DM2 with peripheral neuropathy, RLL adenocarcinoma s/p lobectomy in 2016, RUL NSCLC s/p SBRT (1/2020), HTN, and COPD with chronic hypoxic respiratory failure requiring 3L at baseline who presents with increasing shortness of breath.  She has notice she has been more short of breath since August but it increased significantly over the past day and her oxygen saturation was noted to be 70%.  The patient was placed on BiPAP and given Duoneb en route to the ED.  The patient has noticed fevers, chest pain with breathing, and a scant amount of secretions over the last fever days.  The patient says that her allergies have been acting up.  She denies recent sick contacts, chills, abdominal pain, N/V/D, and new  tamela.         Past Medical History    Past Medical History:   Diagnosis Date     Adenocarcinoma, lung (H)      Asthma      Ectopic pregnancy      Esophageal reflux      Pulmonary emphysema (H)     Very severe FEV1<30% predicted     Type II diabetes mellitus (H)        Past Surgical History   Past Surgical History:   Procedure Laterality Date     2/8/16                R thoracotomy, RLL lobectomy (Dr. Cunha). Adenocarcinoma, 1.1 cm, assoicated with atypical adenomatous hyperplasia Right 02/08/2016    R thoracotomy, RLL lobectomy (Dr. Cunha). Adenocarcinoma, 1.1 cm, assoicated with atypical adenomatous hyperplasia     ESOPHAGOSCOPY, GASTROSCOPY, DUODENOSCOPY (EGD), COMBINED N/A 8/16/2022    Procedure: ESOPHAGOGASTRODUODENOSCOPY (EGD);  Surgeon: Travis Briseno MD;  Location:  GI     ORTHOPEDIC SURGERY         Prior to Admission Medications   Prior to Admission Medications   Prescriptions Last Dose Informant Patient Reported? Taking?   Alpha-Lipoic Acid 300 MG CAPS   No No   Sig: Take 300 mg by mouth daily   Continuous Blood Gluc Sensor (FREESTYLE GIOVANNI 14 DAY SENSOR) MISC   No No   Sig: Change every 14 days.   Easy Comfort Lancets MISC   Yes No   Fluticasone-Umeclidin-Vilanterol (TRELEGY ELLIPTA) 200-62.5-25 MCG/INH oral inhaler   No No   Sig: Inhale 1 puff into the lungs daily   Lancet Devices (EASY MINI EJECT LANCING DEVICE) MISC   Yes No   Omega-3-Acid Eth Est, Dietary, 1 g CAPS   No No   Sig: Take 2 capfuls by mouth 2 times daily   STATIN NOT PRESCRIBED (INTENTIONAL)   No No   Sig: Please choose reason not prescribed from choices below.   albuterol (PROAIR HFA/PROVENTIL HFA/VENTOLIN HFA) 108 (90 Base) MCG/ACT inhaler   No No   Sig: INHALE 1 OR 2 PUFFS BY MOUTH EVERY 4 HOURS AS NEEDED   alcohol swab prep pads   No No   Sig: Use to swab area of injection/xander as directed.   amLODIPine (NORVASC) 10 MG tablet   No No   Sig: Take 1 tablet (10 mg) by mouth daily   aspirin 81 MG EC tablet   No No   Sig: Take 1  tablet (81 mg) by mouth daily   benzonatate (TESSALON) 200 MG capsule   No No   Sig: Take 1 capsule (200 mg) by mouth 3 times daily as needed for cough   cetirizine (ZYRTEC) 10 MG tablet   No No   Sig: Take 1 tablet (10 mg) by mouth daily   coenzyme Q-10 (CO-Q10) 50 MG capsule   No No   Sig: Take 1 capsule (50 mg) by mouth daily   cyclobenzaprine (FLEXERIL) 5 MG tablet   No No   Sig: Take 1 tablet (5 mg) by mouth 3 times daily as needed for muscle spasms   doxycycline hyclate (VIBRA-TABS) 100 MG tablet   No No   Sig: Take 1 tablet (100 mg) by mouth 2 times daily   empagliflozin (JARDIANCE) 10 MG TABS tablet   No No   Sig: Take 1 tablet (10 mg) by mouth daily   fluticasone (FLONASE) 50 MCG/ACT nasal spray   No No   Sig: Spray 1 spray into both nostrils daily Use at night before bed   glipiZIDE (GLUCOTROL) 5 MG tablet   No No   Sig: Take 1 tablet (5 mg) by mouth 2 times daily (before meals)   glucosamine-chondroitin 500-400 MG tablet   No No   Sig: Take 1 tablet by mouth daily   ipratropium - albuterol 0.5 mg/2.5 mg/3 mL (DUONEB) 0.5-2.5 (3) MG/3ML neb solution   No No   Sig: Take 1 vial (3 mLs) by nebulization every 6 hours as needed for shortness of breath / dyspnea or wheezing   ketotifen (ZADITOR) 0.025 % ophthalmic solution   Yes No   Sig: Place 1 drop into both eyes daily   multivitamin w/minerals (THERA-VIT-M) tablet   No No   Sig: Take 1 tablet by mouth daily   omega-3 acid ethyl esters (LOVAZA) 1 g capsule   No No   Sig: Take 2 capsules (2 g) by mouth every morning AND 2 capsules (2 g) every evening.   omeprazole (PRILOSEC) 20 MG DR capsule   No No   Sig: Take 1 capsule (20 mg) by mouth 2 times daily   omeprazole (PRILOSEC) 20 MG DR capsule   No No   Sig: Take 1 capsule (20 mg) by mouth 2 times daily   polyethylene glycol-propylene glycol (SYSTANE) 0.4-0.3 % SOLN ophthalmic solution   No No   Sig: Place 1 drop into both eyes 4 times daily   predniSONE (DELTASONE) 20 MG tablet   Yes No   sodium chloride  (NEBUSAL) 3 % neb solution   No No   Sig: Take 3 mLs by nebulization daily Use 1-2 times daily in combination with Duoneb and Aerobika to help with mucus clearance      Facility-Administered Medications: None      Physical Exam   Vital Signs: Temp: 97.8  F (36.6  C) Temp src: Axillary BP: (!) 155/79 Pulse: 106   Resp: 27 SpO2: 100 % O2 Device: BiPAP/CPAP    Weight: 0 lbs 0 oz  Gen: answers questions appropriately and well nourished  HEENT: no scleral icterus, moist mucous membranes  Cardiac: tachycardic with regular rhythm, no murmurs or rubs  Pulm: few wheezes throughout, breath sounds heard throughout all lung fields  Abd: non distended, non tender to palpation, + BS  Extremities: 2+ radial and dorsalis pedis pulses bilaterally, no BLE edema  Neuro: CN 2-12 intact, GONGORA to commands  Psych: appropriate affect    Medical Decision Making     80 MINUTES SPENT BY ME on the date of service doing chart review, history, exam, documentation & further activities per the note.      Data     I have personally reviewed the following data over the past 24 hrs:    8.6  \   15.3   / 242     N/A N/A N/A /  N/A   N/A N/A N/A \       Trop: 6 BNP: N/A       Procal: N/A CRP: N/A Lactic Acid: 0.8         Imaging results reviewed over the past 24 hrs:   Recent Results (from the past 24 hour(s))   XR Chest Port 1 View    Narrative    EXAM: XR CHEST PORT 1 VIEW  LOCATION: [facility]  DATE/TIME: 1/1/2023 5:49 AM    INDICATION: Shortness of breath.  COMPARISON: 5/20/2022.    FINDINGS: The heart size is normal. The thoracic aorta is calcified. There are surgical clips in the superior mediastinum. No pneumothorax. There is a right pleural effusion. Nodularity and scarring in the right upper lobe. Mild left basilar atelectasis   or scar.      Impression    IMPRESSION: Right pleural effusion.

## 2023-01-01 NOTE — ED NOTES
Sent to provider:    ED, R-3, ADELFO Aparicio Pt c/o 10/10 pain in feet r/t neuropathy. Pt states Tylenol and Neurontin ineffective. Bryon Rosen RN *[phone]

## 2023-01-01 NOTE — ED NOTES
Bed: ED03  Expected date: 1/1/23  Expected time: 4:35 AM  Means of arrival:   Comments:  66y/o F - COPD on CPAP, Tachy

## 2023-01-01 NOTE — ED PROVIDER NOTES
ED Provider Note  [facility]      History     Chief Complaint   Patient presents with     Shortness of Breath     HPI  Jenn Aparicio is a 67 year old female who has past medical history of type 2 diabetes, COPD presenting with shortness of breath.  The patient was found to be hypoxic on her home oxygen.  She is placed on BiPAP with improvement.  Also given DuoNeb en route.  Here the patient still reports shortness of breath.  Denies chest pain, leg pain or swelling.  Denies recent fevers, vomiting or illnesses.    Past Medical History  Past Medical History:   Diagnosis Date     Adenocarcinoma, lung (H)      Asthma      Ectopic pregnancy      Esophageal reflux      Pulmonary emphysema (H)     Very severe FEV1<30% predicted     Type II diabetes mellitus (H)      Past Surgical History:   Procedure Laterality Date     2/8/16                R thoracotomy, RLL lobectomy (Dr. Cunha). Adenocarcinoma, 1.1 cm, assoicated with atypical adenomatous hyperplasia Right 02/08/2016    R thoracotomy, RLL lobectomy (Dr. Cunha). Adenocarcinoma, 1.1 cm, assoicated with atypical adenomatous hyperplasia     ESOPHAGOSCOPY, GASTROSCOPY, DUODENOSCOPY (EGD), COMBINED N/A 8/16/2022    Procedure: ESOPHAGOGASTRODUODENOSCOPY (EGD);  Surgeon: Travis Briseno MD;  Location:  GI     ORTHOPEDIC SURGERY       albuterol (PROAIR HFA/PROVENTIL HFA/VENTOLIN HFA) 108 (90 Base) MCG/ACT inhaler  alcohol swab prep pads  Alpha-Lipoic Acid 300 MG CAPS  amLODIPine (NORVASC) 10 MG tablet  aspirin 81 MG EC tablet  benzonatate (TESSALON) 200 MG capsule  cetirizine (ZYRTEC) 10 MG tablet  coenzyme Q-10 (CO-Q10) 50 MG capsule  Continuous Blood Gluc Sensor (FREESTYLE GIOVANNI 14 DAY SENSOR) MISC  cyclobenzaprine (FLEXERIL) 5 MG tablet  doxycycline hyclate (VIBRA-TABS) 100 MG tablet  Easy Comfort Lancets MISC  empagliflozin (JARDIANCE) 10 MG TABS tablet  fluticasone (FLONASE) 50 MCG/ACT nasal spray  Fluticasone-Umeclidin-Vilanterol (TRELEGY  ELLIPTA) 200-62.5-25 MCG/INH oral inhaler  glipiZIDE (GLUCOTROL) 5 MG tablet  glucosamine-chondroitin 500-400 MG tablet  ipratropium - albuterol 0.5 mg/2.5 mg/3 mL (DUONEB) 0.5-2.5 (3) MG/3ML neb solution  ketotifen (ZADITOR) 0.025 % ophthalmic solution  Lancet Devices (EASY MINI EJECT LANCING DEVICE) MISC  multivitamin w/minerals (THERA-VIT-M) tablet  omega-3 acid ethyl esters (LOVAZA) 1 g capsule  Omega-3-Acid Eth Est, Dietary, 1 g CAPS  omeprazole (PRILOSEC) 20 MG DR capsule  omeprazole (PRILOSEC) 20 MG DR capsule  polyethylene glycol-propylene glycol (SYSTANE) 0.4-0.3 % SOLN ophthalmic solution  predniSONE (DELTASONE) 20 MG tablet  sodium chloride (NEBUSAL) 3 % neb solution  STATIN NOT PRESCRIBED (INTENTIONAL)      Allergies   Allergen Reactions     Interferons Dermatitis     Penicillins Hives     Aspirin      325mg      Colon Care      Family History  Family History   Problem Relation Age of Onset     Lung Cancer Sister      Depression Daughter      Alcohol/Drug Other         self     Diabetes Other         self     Thyroid Disease Other         self     Asthma Other         self     Social History   Social History     Tobacco Use     Smoking status: Every Day     Packs/day: 0.25     Types: Cigarettes     Smokeless tobacco: Former     Tobacco comments:     5/23/22 Patient using 7mg patch, 2 mg lozenges, took workbook   Substance Use Topics     Alcohol use: Yes     Comment: sometime     Drug use: No      Past medical history, past surgical history, medications, allergies, family history, and social history were reviewed with the patient. No additional pertinent items.       Review of Systems  A complete review of systems was performed with pertinent positives and negatives noted in the HPI, and all other systems negative.    Physical Exam   BP: (!) 181/79  Pulse: 112  Temp: 98.1  F (36.7  C)  Resp: 24  Physical Exam  Physical Exam   Constitutional: oriented to person, place, and time. appears well-developed and  well-nourished.   HENT:   Head: Normocephalic and atraumatic.   Neck: Normal range of motion.   Pulmonary/Chest: Moderate respiratory distress.  Diffuse expiratory wheezes bilaterally.  Cardiac: No murmurs, rubs, gallops. RRR.  Abdominal: Abdomen soft, nontender, nondistended. No rebound tenderness.  MSK: Long bones without deformity or evidence of trauma.  No lower extremity edema.  Neurological: alert and oriented to person, place, and time.       ED Course      Procedures            EKG Interpretation:      Interpreted by Isaias Larry MD  Time reviewed: 0445  Symptoms at time of EKG: SOB   Rhythm: normal sinus tachycardia  Rate: 108  Axis: normal  Ectopy: none  Conduction: normal  ST Segments/ T Waves: No ST-T wave changes  Q Waves: Septal q waves  Comparison to prior: Unchanged    Clinical Impression: Sinus tachycardia, no acute changes from prior.      Critical Care Addendum    My initial assessment, based on my review of prehospital provider report, review of nursing observations, review of vital signs, focused history and physical exam, established that Jenn Aparicio has respiratory insufficiency, which requires immediate intervention, and therefore she is critically ill.     After the initial assessment, the care team initiated medication therapy with steroids, abx, magnesium to provide stabilization care. Due to the critical nature of this patient, I reassessed physical exam, mental status, neurologic status and respiratory status multiple times prior to her disposition.     Time also spent performing documentation, reviewing test results and coordination of care.     Critical care time (excluding teaching time and procedures): 30 minutes.              Results for orders placed or performed during the hospital encounter of 01/01/23   XR Chest Port 1 View     Status: None    Narrative    EXAM: XR CHEST PORT 1 VIEW  LOCATION: [facility]  DATE/TIME: 1/1/2023  5:49 AM    INDICATION: Shortness of breath.  COMPARISON: 5/20/2022.    FINDINGS: The heart size is normal. The thoracic aorta is calcified. There are surgical clips in the superior mediastinum. No pneumothorax. There is a right pleural effusion. Nodularity and scarring in the right upper lobe. Mild left basilar atelectasis   or scar.      Impression    IMPRESSION: Right pleural effusion.   Extra Tube ([place] Draw)     Status: None (In process)    Narrative    The following orders were created for panel order Extra Tube ([place] Draw).  Procedure                               Abnormality         Status                     ---------                               -----------         ------                     Extra Blood Culture Bottle[[id number]]                       In process                 Extra Blue Top Tube[[id number]]                              Final result               Extra Red Top Tube[[id number]]                               Final result               Extra Green Top (Lithium...[[id number]]                      Final result               Extra Purple Top Tube[[id number]]                            Final result               Extra Purple Top Tube[[id number]]                            Final result               Extra Green Top (Lithium...[[id number]]                      Final result                 Please view results for these tests on the individual orders.   Extra Blue Top Tube     Status: None   Result Value Ref Range    Hold Specimen JIC    Extra Red Top Tube     Status: None   Result Value Ref Range    Hold Specimen JIC    Extra Green Top (Lithium Heparin) Tube     Status: None   Result Value Ref Range    Hold Specimen JIC    Extra Purple Top Tube     Status: None   Result Value Ref Range    Hold Specimen JIC    Extra Purple Top Tube     Status: None   Result Value Ref Range    Hold Specimen JIC    Extra Green Top (Lithium Heparin) ON ICE     Status: None   Result Value Ref Range    Hold Specimen JIC     Troponin T, High Sensitivity     Status: Normal   Result Value Ref Range    Troponin T, High Sensitivity 6 <=14 ng/L   CBC with platelets and differential     Status: Abnormal   Result Value Ref Range    WBC Count 8.6 4.0 - 11.0 10e3/uL    RBC Count 5.53 (H) 3.80 - 5.20 10e6/uL    Hemoglobin 15.3 11.7 - 15.7 g/dL    Hematocrit 51.5 (H) 35.0 - 47.0 %    MCV 93 78 - 100 fL    MCH 27.7 26.5 - 33.0 pg    MCHC 29.7 (L) 31.5 - 36.5 g/dL    RDW 13.1 10.0 - 15.0 %    Platelet Count 242 150 - 450 10e3/uL    % Neutrophils 54 %    % Lymphocytes 32 %    % Monocytes 8 %    % Eosinophils 4 %    % Basophils 1 %    % Immature Granulocytes 1 %    NRBCs per 100 WBC 0 <1 /100    Absolute Neutrophils 4.7 1.6 - 8.3 10e3/uL    Absolute Lymphocytes 2.7 0.8 - 5.3 10e3/uL    Absolute Monocytes 0.7 0.0 - 1.3 10e3/uL    Absolute Eosinophils 0.3 0.0 - 0.7 10e3/uL    Absolute Basophils 0.1 0.0 - 0.2 10e3/uL    Absolute Immature Granulocytes 0.1 <=0.4 10e3/uL    Absolute NRBCs 0.0 10e3/uL   CBC with platelets differential     Status: Abnormal    Narrative    The following orders were created for panel order CBC with platelets differential.  Procedure                               Abnormality         Status                     ---------                               -----------         ------                     CBC with platelets and d...[[id number]]  Abnormal            Final result                 Please view results for these tests on the individual orders.     Medications   ipratropium - albuterol 0.5 mg/2.5 mg/3 mL (DUONEB) neb solution 3 mL (has no administration in time range)   methylPREDNISolone sodium succinate (solu-MEDROL) injection 125 mg (has no administration in time range)   magnesium sulfate 2 g in water intermittent infusion (has no administration in time range)   levofloxacin (LEVAQUIN) infusion 500 mg (has no administration in time range)        Assessments & Plan (with Medical Decision Making)   MDM  Patient presenting with  shortness of breath.  Clinically she likely has exacerbation of her COPD with diffuse wheezing and hypoxia.  She is placed on BiPAP on arrival.  We did try to trial her off of BiPAP however she had decent amount of work of breathing.  Chest x-ray without obvious evidence of infection.  She is given steroids and levothyroxine for a COPD exacerbation.  No evidence of ACS on EKG.  [place] [facility] [place]    I have reviewed the nursing notes. I have reviewed the findings, diagnosis, plan and need for follow up with the patient.    Medical Decision Making  The patient presented with a problem that is an acute illness with systemic symptoms and a chronic illness severe exacerbation, progression, or side effect of treatment.    The patient's evaluation involved:  review of 3+ prior external note(s) (see separate area of note for details)  ordering and review of 3+ test(s) (see separate area of note for details)  review of 3+ test result(s) ordered prior to this encounter (see separate area of note for details)  strong consideration of a test (CT PE scan) that was ultimately deferred    The patient's management involved a decision regarding hospitalization.        New Prescriptions    No medications on file       Final diagnoses:   COPD exacerbation (H)       --  Isaias Larry  [facility] EMERGENCY DEPARTMENT  1/1/2023     Isaias Larry MD  01/01/23 2803

## 2023-01-02 LAB
ANION GAP SERPL CALCULATED.3IONS-SCNC: 11 MMOL/L (ref 7–15)
BASOPHILS # BLD AUTO: 0 10E3/UL (ref 0–0.2)
BASOPHILS NFR BLD AUTO: 0 %
BUN SERPL-MCNC: 12.2 MG/DL (ref 8–23)
C PNEUM DNA SPEC QL NAA+PROBE: NOT DETECTED
CALCIUM SERPL-MCNC: 9.1 MG/DL (ref 8.8–10.2)
CHLORIDE SERPL-SCNC: 99 MMOL/L (ref 98–107)
CREAT SERPL-MCNC: 0.56 MG/DL (ref 0.51–0.95)
DEPRECATED HCO3 PLAS-SCNC: 26 MMOL/L (ref 22–29)
EOSINOPHIL # BLD AUTO: 0 10E3/UL (ref 0–0.7)
EOSINOPHIL NFR BLD AUTO: 0 %
ERYTHROCYTE [DISTWIDTH] IN BLOOD BY AUTOMATED COUNT: 13.2 % (ref 10–15)
FLUAV H1 2009 PAND RNA SPEC QL NAA+PROBE: NOT DETECTED
FLUAV H1 RNA SPEC QL NAA+PROBE: NOT DETECTED
FLUAV H3 RNA SPEC QL NAA+PROBE: NOT DETECTED
FLUAV RNA SPEC QL NAA+PROBE: NOT DETECTED
FLUBV RNA SPEC QL NAA+PROBE: NOT DETECTED
GFR SERPL CREATININE-BSD FRML MDRD: >90 ML/MIN/1.73M2
GLUCOSE BLDC GLUCOMTR-MCNC: 147 MG/DL (ref 70–99)
GLUCOSE BLDC GLUCOMTR-MCNC: 177 MG/DL (ref 70–99)
GLUCOSE BLDC GLUCOMTR-MCNC: 263 MG/DL (ref 70–99)
GLUCOSE BLDC GLUCOMTR-MCNC: 317 MG/DL (ref 70–99)
GLUCOSE SERPL-MCNC: 161 MG/DL (ref 70–99)
HADV DNA SPEC QL NAA+PROBE: NOT DETECTED
HCOV PNL SPEC NAA+PROBE: NOT DETECTED
HCT VFR BLD AUTO: 47.5 % (ref 35–47)
HGB BLD-MCNC: 14.2 G/DL (ref 11.7–15.7)
HMPV RNA SPEC QL NAA+PROBE: NOT DETECTED
HPIV1 RNA SPEC QL NAA+PROBE: NOT DETECTED
HPIV2 RNA SPEC QL NAA+PROBE: NOT DETECTED
HPIV3 RNA SPEC QL NAA+PROBE: NOT DETECTED
HPIV4 RNA SPEC QL NAA+PROBE: NOT DETECTED
IMM GRANULOCYTES # BLD: 0 10E3/UL
IMM GRANULOCYTES NFR BLD: 0 %
LYMPHOCYTES # BLD AUTO: 0.8 10E3/UL (ref 0.8–5.3)
LYMPHOCYTES NFR BLD AUTO: 12 %
M PNEUMO DNA SPEC QL NAA+PROBE: NOT DETECTED
MCH RBC QN AUTO: 28.1 PG (ref 26.5–33)
MCHC RBC AUTO-ENTMCNC: 29.9 G/DL (ref 31.5–36.5)
MCV RBC AUTO: 94 FL (ref 78–100)
MONOCYTES # BLD AUTO: 0.2 10E3/UL (ref 0–1.3)
MONOCYTES NFR BLD AUTO: 2 %
NEUTROPHILS # BLD AUTO: 5.9 10E3/UL (ref 1.6–8.3)
NEUTROPHILS NFR BLD AUTO: 86 %
NRBC # BLD AUTO: 0 10E3/UL
NRBC BLD AUTO-RTO: 0 /100
PLATELET # BLD AUTO: 181 10E3/UL (ref 150–450)
POTASSIUM SERPL-SCNC: 4.9 MMOL/L (ref 3.4–5.3)
RBC # BLD AUTO: 5.05 10E6/UL (ref 3.8–5.2)
RSV RNA SPEC QL NAA+PROBE: NOT DETECTED
RSV RNA SPEC QL NAA+PROBE: NOT DETECTED
RV+EV RNA SPEC QL NAA+PROBE: NOT DETECTED
SODIUM SERPL-SCNC: 136 MMOL/L (ref 136–145)
WBC # BLD AUTO: 7 10E3/UL (ref 4–11)

## 2023-01-02 PROCEDURE — 250N000009 HC RX 250: Performed by: PHYSICIAN ASSISTANT

## 2023-01-02 PROCEDURE — 36415 COLL VENOUS BLD VENIPUNCTURE: CPT | Performed by: PHYSICIAN ASSISTANT

## 2023-01-02 PROCEDURE — 999N000033 HC STATISTIC CHRONIC PULMONARY DISEASE SPECIALIST

## 2023-01-02 PROCEDURE — 250N000013 HC RX MED GY IP 250 OP 250 PS 637: Performed by: STUDENT IN AN ORGANIZED HEALTH CARE EDUCATION/TRAINING PROGRAM

## 2023-01-02 PROCEDURE — 272N000202 HC AEROBIKA WITH MANOMETER

## 2023-01-02 PROCEDURE — 99207 PR APP CREDIT; MD BILLING SHARED VISIT: CPT | Performed by: PHYSICIAN ASSISTANT

## 2023-01-02 PROCEDURE — 999N000032 HC STATISTIC CHRONIC DISEASE SPECIALIST RT CONSULT

## 2023-01-02 PROCEDURE — 94640 AIRWAY INHALATION TREATMENT: CPT | Mod: 76

## 2023-01-02 PROCEDURE — 250N000013 HC RX MED GY IP 250 OP 250 PS 637: Performed by: PHYSICIAN ASSISTANT

## 2023-01-02 PROCEDURE — 94799 UNLISTED PULMONARY SVC/PX: CPT

## 2023-01-02 PROCEDURE — 99233 SBSQ HOSP IP/OBS HIGH 50: CPT | Performed by: STUDENT IN AN ORGANIZED HEALTH CARE EDUCATION/TRAINING PROGRAM

## 2023-01-02 PROCEDURE — 120N000003 HC R&B IMCU UMMC

## 2023-01-02 PROCEDURE — 999N000157 HC STATISTIC RCP TIME EA 10 MIN

## 2023-01-02 PROCEDURE — G0463 HOSPITAL OUTPT CLINIC VISIT: HCPCS

## 2023-01-02 PROCEDURE — 80048 BASIC METABOLIC PNL TOTAL CA: CPT | Performed by: PHYSICIAN ASSISTANT

## 2023-01-02 PROCEDURE — 85025 COMPLETE CBC W/AUTO DIFF WBC: CPT | Performed by: PHYSICIAN ASSISTANT

## 2023-01-02 PROCEDURE — 99406 BEHAV CHNG SMOKING 3-10 MIN: CPT

## 2023-01-02 PROCEDURE — 250N000011 HC RX IP 250 OP 636: Performed by: EMERGENCY MEDICINE

## 2023-01-02 PROCEDURE — 250N000011 HC RX IP 250 OP 636: Performed by: PHYSICIAN ASSISTANT

## 2023-01-02 RX ORDER — PREGABALIN 75 MG/1
75 CAPSULE ORAL 3 TIMES DAILY
Status: DISCONTINUED | OUTPATIENT
Start: 2023-01-02 | End: 2023-01-04 | Stop reason: HOSPADM

## 2023-01-02 RX ADMIN — LEVOFLOXACIN 500 MG: 5 INJECTION, SOLUTION INTRAVENOUS at 06:02

## 2023-01-02 RX ADMIN — CETIRIZINE HYDROCHLORIDE 10 MG: 10 TABLET, FILM COATED ORAL at 08:36

## 2023-01-02 RX ADMIN — POLYETHYLENE GLYCOL 400 AND PROPYLENE GLYCOL 1 DROP: 4; 3 SOLUTION/ DROPS OPHTHALMIC at 12:10

## 2023-01-02 RX ADMIN — ASPIRIN 81 MG: 81 TABLET ORAL at 08:36

## 2023-01-02 RX ADMIN — EMPAGLIFLOZIN 10 MG: 10 TABLET, FILM COATED ORAL at 08:36

## 2023-01-02 RX ADMIN — INSULIN ASPART 1 UNITS: 100 INJECTION, SOLUTION INTRAVENOUS; SUBCUTANEOUS at 08:40

## 2023-01-02 RX ADMIN — POLYETHYLENE GLYCOL 400 AND PROPYLENE GLYCOL 1 DROP: 4; 3 SOLUTION/ DROPS OPHTHALMIC at 08:38

## 2023-01-02 RX ADMIN — ENOXAPARIN SODIUM 40 MG: 40 INJECTION SUBCUTANEOUS at 09:07

## 2023-01-02 RX ADMIN — IPRATROPIUM BROMIDE AND ALBUTEROL SULFATE 3 ML: 2.5; .5 SOLUTION RESPIRATORY (INHALATION) at 08:11

## 2023-01-02 RX ADMIN — INSULIN ASPART 1 UNITS: 100 INJECTION, SOLUTION INTRAVENOUS; SUBCUTANEOUS at 12:09

## 2023-01-02 RX ADMIN — SENNOSIDES AND DOCUSATE SODIUM 2 TABLET: 8.6; 5 TABLET ORAL at 08:56

## 2023-01-02 RX ADMIN — IPRATROPIUM BROMIDE AND ALBUTEROL SULFATE 3 ML: 2.5; .5 SOLUTION RESPIRATORY (INHALATION) at 19:56

## 2023-01-02 RX ADMIN — AMLODIPINE BESYLATE 10 MG: 10 TABLET ORAL at 08:36

## 2023-01-02 RX ADMIN — UMECLIDINIUM 1 PUFF: 62.5 AEROSOL, POWDER ORAL at 08:39

## 2023-01-02 RX ADMIN — METHYLPREDNISOLONE SODIUM SUCCINATE 62.5 MG: 125 INJECTION, POWDER, FOR SOLUTION INTRAMUSCULAR; INTRAVENOUS at 08:36

## 2023-01-02 RX ADMIN — PANTOPRAZOLE SODIUM 40 MG: 40 TABLET, DELAYED RELEASE ORAL at 16:00

## 2023-01-02 RX ADMIN — Medication 1 TABLET: at 08:36

## 2023-01-02 RX ADMIN — PREGABALIN 75 MG: 75 CAPSULE ORAL at 19:54

## 2023-01-02 RX ADMIN — FLUTICASONE FUROATE AND VILANTEROL TRIFENATATE 1 PUFF: 200; 25 POWDER RESPIRATORY (INHALATION) at 08:39

## 2023-01-02 RX ADMIN — METHYLPREDNISOLONE SODIUM SUCCINATE 62.5 MG: 125 INJECTION, POWDER, FOR SOLUTION INTRAMUSCULAR; INTRAVENOUS at 21:55

## 2023-01-02 RX ADMIN — INSULIN ASPART 4 UNITS: 100 INJECTION, SOLUTION INTRAVENOUS; SUBCUTANEOUS at 17:37

## 2023-01-02 RX ADMIN — FLUTICASONE PROPIONATE 1 SPRAY: 50 SPRAY, METERED NASAL at 08:39

## 2023-01-02 RX ADMIN — CYCLOBENZAPRINE HYDROCHLORIDE 5 MG: 5 TABLET, FILM COATED ORAL at 08:36

## 2023-01-02 RX ADMIN — KETOTIFEN FUMARATE 1 DROP: 0.35 SOLUTION/ DROPS OPHTHALMIC at 08:38

## 2023-01-02 RX ADMIN — POLYETHYLENE GLYCOL 400 AND PROPYLENE GLYCOL 1 DROP: 4; 3 SOLUTION/ DROPS OPHTHALMIC at 16:09

## 2023-01-02 RX ADMIN — CYCLOBENZAPRINE HYDROCHLORIDE 5 MG: 5 TABLET, FILM COATED ORAL at 19:54

## 2023-01-02 RX ADMIN — CYCLOBENZAPRINE HYDROCHLORIDE 5 MG: 5 TABLET, FILM COATED ORAL at 16:09

## 2023-01-02 RX ADMIN — SENNOSIDES AND DOCUSATE SODIUM 2 TABLET: 8.6; 5 TABLET ORAL at 22:15

## 2023-01-02 RX ADMIN — PANTOPRAZOLE SODIUM 40 MG: 40 TABLET, DELAYED RELEASE ORAL at 08:36

## 2023-01-02 RX ADMIN — PREGABALIN 75 MG: 75 CAPSULE ORAL at 16:00

## 2023-01-02 RX ADMIN — POLYETHYLENE GLYCOL 400 AND PROPYLENE GLYCOL 1 DROP: 4; 3 SOLUTION/ DROPS OPHTHALMIC at 19:54

## 2023-01-02 RX ADMIN — IPRATROPIUM BROMIDE AND ALBUTEROL SULFATE 3 ML: 2.5; .5 SOLUTION RESPIRATORY (INHALATION) at 14:24

## 2023-01-02 ASSESSMENT — ACTIVITIES OF DAILY LIVING (ADL)
ADLS_ACUITY_SCORE: 32
ADLS_ACUITY_SCORE: 33
ADLS_ACUITY_SCORE: 32
ADLS_ACUITY_SCORE: 32
ADLS_ACUITY_SCORE: 33
ADLS_ACUITY_SCORE: 33
ADLS_ACUITY_SCORE: 32
ADLS_ACUITY_SCORE: 33
ADLS_ACUITY_SCORE: 32
DEPENDENT_IADLS:: INDEPENDENT

## 2023-01-02 NOTE — PROGRESS NOTES
Transfer  Transferred from: ED  Via: stretcher   Reason for transfer: Pt appropriate for 6B- placement  Family: Aware of transfer  Belongings: Received with pt  Chart: Received with pt  Medications: Meds received from old unit with pt  Code Status verified on armband: yes  2 RN Skin Assessment Completed By: Araceli WILLS and Ann Marie RN  Med rec completed: no  Bed surface reassessed with algorithm and charted: yes  New bed surface ordered: no  Suction/Ambu bag/Flowmeter at bedside: yes    Report received from: JOHANN Slater  Pt status: stable

## 2023-01-02 NOTE — PLAN OF CARE
Neuro: A&Ox4. RLE neuropathy. Q8 VS.  Cardiac: SR. VSS.   Respiratory: Sating >93% on 3L nc. Pt uses 3L nc at home on baseline.  GI/: Adequate urine output via commode. No BM this shift, prn senna given.   Diet/appetite: Tolerating regular diet. ACHS BS.  Activity:  SBA. Refused bed alarm, independent in room.   Pain: Peripheral neuropathy managed with lyrica.   Skin: No new deficits noted.  LDA's: 2x PIV    Plan: Continue with POC. Notify primary team with changes.

## 2023-01-02 NOTE — CONSULTS
Nicotine Cessation Consult   2023    Patient: Jenn Aparicio      :  1955                    MRN:[id number]      History of Present Illness: Jenn Aparicio is a 67 year old female with a pmh of DM2 with peripheral neuropathy, RLL adenocarcinoma s/p lobectomy in 2016, RUL NSCLC s/p SBRT (2020), HTN  COPD with chronic hypoxic respiratory failure     Reason for Consult: Patient is a current everyday smoker per chart review.    Patient open to sharing about her tobacco use and in learning about more options and resources for quitting, staying quit, and in developing a relapse prevention plan.        Types of Tobacco and Amount   Cigarettes 0.5 PPD   E-Cigs    Smokeless Tobacco    Cigars    Pipes    Waterpipes      Patients last cigarette/vape/e-cig/smokeless tobacco:    1 week ago     [place] Test for Nicotine Dependence   How soon after waking do you smoke your first cigarette Within 5 minutes=3  5-30 minutes=2  31-60 minute=1  >61 minutes=0                  1   Do you find it difficult to refrain from smoking in places where it is forbidden? e.g. [employer], restaurants, etc? Yes=1  No=0               0   Which cigarette would you hate to give up? The first in the morning=1  Any other=0             0   How many cigarettes a day do you smoke? 10 or less=0  11-20=1  21-30=2  31 or more=3        0   Do you smoke more frequently in the morning?   Yes=1  No=0        0   Do you smoke even if you are sick in bed most of the day? Yes=1  No=0                       0                                                                                                                                     Total Score                       1   SCORE 1-2 = Low Dependence   3-4 = Low to Mod Dependence    5-7 = Moderate Dependence      8+ = High Dependence     [place] Tobacco Withdrawal Scale   DSM-5 Symptoms 0 = none, 1 = slight, 2 = mild, 3 = moderate, 4 = severe   Desire or craving to smoke                      0   Anxious,  "nervous                      0   Depressed mood, sad                      0   Difficulty concentrating                      0   Increased appetite, hungry, weight gain                      0   Insomnia, sleep problems, awakening at night                                                                2   Restless                      1   Impatient                       0    Total Score                      3   Score:  1-8=Slight          8-16=Mild           16-24=moderate     24+=Severe     Stage of Behavior Change:   Pre-contemplation - No intention    Contemplation - Change on the horizon    Preparation - Getting Ready                 X   Action - Consistently changed (within 6 months)    Maintenance - Staying quit (more than 6 months)    Relapse - Recycling      Patients Motivation to Quit Scale    Importance (1-10):          10   Readiness (1-10)          10   Confidence  (1-10):            8   Can Patient Imagine a Future without Smoking:      YES   Quit Attempt Date:       TBD   Final Quit Date:       Patients main reason(s) for smoking include: Social, physical, emotional, other    Top Reason(s) to quit smoking: Stay healthy and live longer      Quit Attempts: Several    Triggers: Stress    Assessment Using Motivational Interviewing:     MI Intervention: Expressed Empathy/Understanding, Supported Autonomy, Collaboration, Evocation, Permission to raise concern or advise, Open-ended questions, Reflections: simple and complex, Change talk (evoked) and Reframe      Nicotine Dependence Score:          1  Withdrawal Score:                             3    Assessment/Education/Recommendations    Education:    -Provided education on the safety of NRT, effects of stabilizing the brain to allow for behavior modification and increasing chances of quitting.   -Provided educational workbook, \"Quitting for Good with Treatment and Support.\"   -Discussed health risks of continued smoking.   -Provided motivation and " encouragement.   -Shared that it's never too late to quit and that the benefits are immediate and profoundly impactful.   -Shared that slipping up here and there doesn't necessarily mean she failed; rather, it's an opportunity to reflect and modify her behaviors and habits and to employ alternate strategies to deal with strong triggers.    Recommendations:   -Encouraged patient to use workbook to help her understand why she smokes, come up with 5 reasons to quit, and to imagine what her future looks like without smoking.   -Encouraged her to develop strategies to distract herself to get past cravings.     Developed Smoking Cessation Relapse Prevention Plan that includes recommendations of:    --Discharge with 7/14 mg patch starter kit and continue to use daily, continue the initial patch for 4-6 weeks of smoking abstinence based on withdrawal symptoms. Taper every 4-6 weeks in 7 mg steps as tolerated.     -Discharge with Nicotrol inhaler     **The USPSTF recommends annual screening for lung cancer with low-dose computed tomography (LDCT) in adults aged 50 to 80 years who have a 20 pack-year smoking history and currently smoke or have quit within the past 15 years.    -Agrees to participate in our post-hosp smoking cessation follow-up calls for treatment and support, starting at 48 hrs post discharge, then at 14 days, 1 month, and at 6 months.     Time Spent: I spent 7 minutes with patient. Will notify provider of recommendations.    Gave contact information and will call 48 hours after discharge to provide support.    Nayana Gonzalez, SILVESTRE, RRT, CTTS  Chronic Pulmonary Disease Specialist  Office: [phone]   Pager: [phone]

## 2023-01-02 NOTE — CONSULTS
Care Management Initial Consult    General Information  Assessment completed with: Patient   Type of CM/SW Visit: Initial Assessment  Primary Care Provider verified and updated as needed: No   Readmission within the last 30 days: no previous admission in last 30 days   Reason for Consult: discharge planning  Advance Care Planning: Blank HCD provided to patient.      Communication Assessment  Patient's communication style: spoken language (English or Bilingual)    Hearing Difficulty or Deaf: no   Wear Glasses or Blind: yes    Cognitive  Cognitive/Neuro/Behavioral: WDL      Living Environment:   People in home: alone     Current living Arrangements: apartment      Able to return to prior arrangements: yes    Family/Social Support:  Care provided by: self  Provides care for: no one  Marital Status: Single  Support System: Children          Description of Support System: Supportive, Involved       Current Resources:   Patient receiving home care services: No  Community Resources: [facility], [facility], [place] Work[facility]  Equipment currently used at home: cane, straight, grab bar, tub/shower, walker, standard  Supplies currently used at home: Oxygen Tubing/Supplies, Nebulizer tubing    Employment/Financial:  Employment Status: disabled       Functional Status:  Prior to admission patient needed assistance: Independent w/all ADLs.      Mental Health Status:  Mental Health Status: No Current Concerns       Chemical Dependency Status:  Chemical Dependency Status: No Current Concerns           Additional Information:  Care management assessment completed w/the patient via phone. Patient lives locally, alone. Patient notes that she has a new county worker and is supposed to be receiving PCA and ILS worker services but this has not been set up yet.     Patient is on 3L O2 at baseline, notes that she has difficulty w/the adapter for the portable tanks. Patients states that she has reached out to the company before ([employer]) but they have not been able to help her troubleshoot the situation. Writer contacted [employer] at this time, they note that they are unable to provide any additional assistance, as the patient will either need to be able to take the regulator off of new tanks or ask a caregiver to do so, patient was updated on this. Patient will need a portable tank delivered to the hospital upon discharge.       Patient notes that her PCP has left, notes that Mitziruth Campos is no longer her PCP. Patient has scheduled an appointment with a provider at [facility] for 1/10, cannot recall their name at this time.     Patient confirms that she has a walker, a cane and mobile grab bars in her bathroom. Patient notes that she did not bring shoes or clothing with her to the hospital, writer encouraged her to contact her daughter when it is time to discharge to bring clothing and provide a ride.  Patient notes that she has 9 children, she has been provided with a blank HCD, will f/u with virtual notary when complete.     [facility] (3L continuously)  Phone: [phone]  Fax: [phone]    Stacie Bermeo, RNCC, BSN    [facility]    [address]  [address] [place] [address]    [email or web address]    Office: [phone] Pager: [phone]    To contact the weekend RNCC  [place]iley (0800 - 1630) Saturday and Sunday    Units: 4A, 4C, 4E, 5A and 5B- Pager 1: [phone]    Units: 6A, 6B, 6C, 6D- Pager 2: [phone]    Units: 7A, 7B, 7C, 7D, and 5C-Pager 3: [phone]

## 2023-01-02 NOTE — PLAN OF CARE
Bedside shift change report given to Jaquan Ferris RN (oncoming nurse) by Dante Ly RN (offgoing nurse).  Report included the following information SBAR, Kardex, Intake/Output and MAR. ' Neuro: A&Ox4.   Cardiac: ST. Slightly hypertensive, but within goals for SBP, OVSS.   Respiratory: Sating >92% on 3L NC.  GI/: Adequate urine output. No BM in several days per pt   Diet/appetite: regular diet, reporting poor appetite   Activity:  SBA to commode   Pain: pain to bilateral feet, significantly decreased sensation to feet  Skin: No new deficits noted.  LDA's: R PIV, L PIV    Plan: Continue with POC. Notify primary team with changes.

## 2023-01-02 NOTE — PROGRESS NOTES
[facility]    Medicine Progress Note - Hospitalist Service, GOLD TEAM 6    Date of Admission:  1/1/2023    Assessment & Plan   Jenn Aparicio is a 67 year old female with a past medical history of DM2 with peripheral neuropathy, RLL adenocarcinoma s/p lobectomy in 2016, RUL NSCLC s/p SBRT (1/2020), HTN  COPD with chronic hypoxic respiratory failure requiring 3L at baseline who presents with COPD exacerbation and admitted on 1/1/2023.     Severe COPD with Acute Exacerbation   Acute on Chronic Hypoxic Respiratory Failure, resolved  Lung Nodule  Hx of RLL Adenocarcinoma s/p Lobectomy (2016) - patient presenting with increasing dyspnea and wheezing x 1 week.  Found to be hypoxic by EMS, placed on BIPAP yesterday for work of breathing and oxygen needs. Chest CT negative for PE, but with RUL nodule increasing in size from imaging on 8/31/22.  Otherwise, imaging significant for emphysema. COVID 19, influenza A/B and RSV negative. She is now on her home O2 requirements of 3Ls. Suspect COPD exacerbation as etiology of presenting complaints.   -Continue methylprednisolone 60mg BID, hope to taper tomorrow   -Continue levofloxacin 500mg daily   -Continue duo-nebs, tessalon, and inhalers per hospital formulary.   -RVP still in process  -Follow up with Dr. Leung regarding pulmonary nodule at discharge     HTN - Continue PTA amlodipine 10mg daily      Type 2 Diabetes Hgb A1c 9.9  Peripheral neuropathy - Patient with increasing peripheral neuropathy in the past 2 months, preventing ADLs.  She has tried gabapentin in the past with limited relief.  Has initial appointments with PCP and endocrinology this month.   -Add Lyrica 75mg TID for neuropathic pain  -Continue PTA Jardiance 10mg daily  -Hold Glipizide 5mg BID  -Continue novolog sliding scale. Pending blood sugar trends, may add Lantus  -Follow up with PCP and endocrinology as scheduled      GERD   Dysphagia - Continue PTA omeprazole  "20mg BID           Diet: Combination Diet Regular Diet Adult    DVT Prophylaxis: Enoxaparin (Lovenox) SQ  Andrade Catheter: Not present  Lines: None     Cardiac Monitoring: None  Code Status: Full Code      Clinically Significant Risk Factors                       # DMII: A1C = 9.9 % (Ref range: <5.7 %) within past 3 months, PRESENT ON ADMISSION  # Obesity: Estimated body mass index is 31.38 kg/m  as calculated from the following:    Height as of this encounter: 1.676 m (5' 6\").    Weight as of this encounter: 88.2 kg (194 lb 7.1 oz)., PRESENT ON ADMISSION         Disposition Plan      Expected Discharge Date: 01/04/2023        Discharge Comments: Patient on home O2 needs with improvement in dyspnea, inpatient vs outpatient eval of enlarging pulmonary nodules.        The patient's care was discussed with the Attending Physician, Dr. Borja.    Toña Shen PA-C  Hospitalist Service, [address]  Securely message with Vocera (more info)  Text page via [facility] Paging/Directory   See signed in provider for up to date coverage information  ______________________________________________________________________    Interval History   All overnight events reviewed. Patient notes improvement in her breathing and headaches compared to yesterday.  She is now back on her home O2 requirements.  Her main complaint is her peripheral neuropathy that makes it difficult to do daily activities, including set up her oxygen. She confirms she has several appointments the month of January, including establishing care with a PCP and endocrinology.     She denies wheezing, chest pain, dyspnea, abdominal pain, change in bowel or urinary habits.     Physical Exam   Vital Signs: Temp: 98.1  F (36.7  C) Temp src: Oral BP: (!) 145/74 Pulse: 93   Resp: 18 SpO2: 95 % O2 Device: Nasal cannula Oxygen Delivery: 3 LPM  Weight: 194 lbs 7.13 oz    GENERAL: Alert and oriented x 3. NAD. " Cooperative.   HEENT: Anicteric sclera. Mucous membranes moist. NC. AT.   CV: RRR. S1, S2. No murmurs appreciated.   RESPIRATORY: Effort normal on 3Ls.  Lungs CTAB with no wheezing, rales, rhonchi. Diminished in bilateral bases.   GI: Abdomen soft and non distended with normoactive bowel sounds present in all quadrants. No tenderness  NEUROLOGICAL: No focal deficits. Moves all extremities.  CN 2-12 grossly intact.  EXTREMITIES: No peripheral edema. Intact bilateral pedal pulses.   SKIN: No jaundice. No rashes.        Medical Decision Making         Data     I have personally reviewed the following data over the past 24 hrs:    7.0  \   14.2   / 181     136 99 12.2 /  177 (H)   4.9 26 0.56 \       ALT: 7 (L) AST: 19 AP: 101 TBILI: 0.2   ALB: 4.2 TOT PROTEIN: 7.2 LIPASE: N/A       TSH: N/A T4: N/A A1C: N/A       Procal: 0.03 CRP: N/A Lactic Acid: 1.4

## 2023-01-02 NOTE — CONSULTS
Chronic Pulmonary Disease Specialist Consult   COPD Initial Interview    2023    Patient: Jenn Aparicio      :  1955                    MRN:[id number]      Reason for Consult:  Consulted to provide COPD education and optimize treatment regimen.    History of Present Illness: Jenn Aparicio is a 67 year old female with a pmh of DM2 with peripheral neuropathy, RLL adenocarcinoma s/p lobectomy in 2016, RUL NSCLC s/p SBRT (2020), HTN  COPD with chronic hypoxic respiratory failure     Smoking Hx: Patient has been an off and on smoker. Patient has had a recent move to a new apartment that does not allow smoking which she is grateful for. She had multiple exacerbations when living in her old building due to to smoking issues and extreme amount of mold present in the building.  She was experiencing increased stress with her recent move and had been smoking close to 1/2 PPD. She requested NRT to help her get back on track.  Will contact hospitalist to provide prescriptions for 14 mg nicotine patch and Nicotrol inhaler.                  Pulmonologist/Last office visit:  Patient saw Dr. Lynda Spann at [facility] clinic on 8/10/2022. Dr. Spann is no longer with [facility] so Jenn will be seeing a pulmonologist with [facility].      Most recent  PFT/interpretation on:  12/15/2020               FVC         44%   FEV1         24%   FEV1/FVC   (Ratio)                               44%   DLCO         38%   Interpretation    Very Severe Airflow Obstruction with a significant bronchodilator response. TLC, RV, FRC, and RV/TLC are all increased indicating overinflation and air trapping.  Severe diffusion defect    Sleep Studies:  Study in 2021 was suspicious for DAVID. Recommendation was made that patient complete an in-lab study. Patient has not pursued this and writer reminded and encouraged patient to do this.  (Referral is already in chart)    Home Oxygen Use:  Patient uses 3 L oxygen at home at night.  Patient is requesting to change oxygen companies which is her right. Patient is past the 5 year window of continuation with her current provider, [facility]. Hospitalist will complete new oxygen order for patient and provide updated F2F note and RNCC will send to [place]. Writer will leave a message on teams for RNCC, Stacie Bermeo, with this information. Unable to reach via phone at this time.        Pulmonary Rehab History:  none    Home respiratory medications include:        --Trelegy Ellipta DPI  --Duoneb Q 6 prn  --Albuterol MDI Q 4 prn  --3% saline nebs 1-2 X's daily as needed for mucous clearance    Assessment:    Jenn was found completing a neb treatment when writer entered patients room. She was on 3 LPM oxygen via NC with oxygen saturation of 95%. She was alert and oriented and was not experiencing respiratory distress or increased shortness of breath.          Action:         Evaluated patients inspiratory flow using In-Check device:     --Low resistance setting: Patient able to generate 45 LPM,   which is sufficient inspiratory flow for an Albuterol rescue inhaler.  Albuterol inhalers require a slow inhalation of   20-60 LPM for optimal drug disposition with 5-10 second breath hold.      --Medium resistance setting: Patient able to generate 50 LPM,   which is sufficient inspiratory flow for her Trelegy DPI.   Medium resistance inhalers require a fast and deep inhalation of   30-80LPM with 5-10 second breath hold.     --Evaluated patients' coordination and technique with inhaler: Patient demonstrated good technique with all of her inhalers. Educated patient on proper inspiratory flows needed for all her inhalers. Provided and instructed patient on proper use of Aerochamber spacer with inhaler; reiterated that spacer should always be used with her rescue inhaler.       -Patient able to generate a pressure of 10 cm H2O on Aerobika OPEP device for 2-3 seconds generating good strong  "non-productive cough. The goal for each breath, for a total of 3 sets of 10 breaths, is to exhale at a of pressure 10-20 for 3-4 seconds without fatigue. Educated patient to perform 2 to 3 'marcelino coughs'  to clear airway after each set. Shared that consistent use of this device will help open smaller airways, improve mucus clearance, decrease cough frequency, and improve exercise tolerance.     Recommendations:    Continue with current inpatient respiratory medication schedule.       Will need to establish care with a new Pulmonologist at [facility] and get complete PFT's      Use Aerobika Oscillating PEP Device for 3 sets of 10 breaths two times daily as directed above      7/14 mg Nicotine Patch to help reduce symptoms of withdrawal and cravings (Provide prescription at discharge)    Smoking Cessation Counseling and Relapse Prevention Consult (DONE)    Provide with prescription at discharge for Nicotrol inhaler.      Consider CT chest for lung cancer screening given smoking history      PT/OT consult to perform cognitive evaluation, assess patients functional abilities, limitations, home care needs, and make recommendations for safe transition to home or TCU.      Referral for outpatient pulmonary Rehab      Referral for OP sleep consult to assess for sleep disordered breathing, DAVID, nocturnal hypoxia, and hypoventilation (referral already in chart)      Home Oxygen Assessment Testing 24-48 hours prior to discharge to determine O2 needs at rest and with exertion/activity. If the patient requires oxygen at rest, the walk test must be performed using the new baseline O2 to determine if patient needs more oxygen with activity.     In the event patient qualifies for oxygen, at rest or with activity, please use the following verbiage in oxygen order: \"Please provide portable oxygen concentrator AND please test for oxygen conserving device using ____LPM to maintain sats between ____)    Patient wishes to change " oxygen companies. Please see instructions above in red.      Patient is a good candidate for COPD Action Plan due to high readmission risk      At discharge continue with patient's home regimen of respiratory medications    Will continue to follow and support patient as needed. Will follow up with phone call 48 hours after discharge.     I spent 70 minutes with the patient.    SILVESTRE Mistry, RRT, CTTS  Chronic Pulmonary Disease Specialist  Office: [phone]   Pager: [phone]

## 2023-01-02 NOTE — PLAN OF CARE
Goal Outcome Evaluation:      Plan of Care Reviewed With: patient    Overall Patient Progress: no changeOverall Patient Progress: no change    Outcome Evaluation: encourage oral intake of meals. Ensure max protein at HS

## 2023-01-02 NOTE — PROGRESS NOTES
"CLINICAL NUTRITION SERVICES - ASSESSMENT NOTE     Nutrition Prescription    RECOMMENDATIONS FOR MDs/PROVIDERS TO ORDER:  None at this time     Malnutrition Status:    Patient does not meet two of the established criteria necessary for diagnosing malnutrition    Recommendations already ordered by Registered Dietitian (RD):  Ensure max protein at HS (chocolate)    Future/Additional Recommendations:  -- monitor oral intake of meals and supplement  -- monitor weight trends      REASON FOR ASSESSMENT  Jenn Aparicio is a/an 67 year old female assessed by the dietitian for Admission Nutrition Risk Screen for positive    Per chart review patient with a pmh of DM2 with peripheral neuropathy, RLL adenocarcinoma s/p lobectomy in 2016, RUL NSCLC s/p SBRT (1/2020), HTN  COPD with chronic hypoxic respiratory failure    NUTRITION HISTORY  Jenn reports her appetite has been decreased recently and reports some weight loss. Jenn reports she has been trying to force herself to eat while in the hospital.  She reports she has seen a RD recently who requested her to try ensure.     CURRENT NUTRITION ORDERS  Diet: Regular  Intake/Tolerance: 100% intake x 1 meal per nursing documentation     LABS  Labs reviewed  (1/1): Ketones 40 mg/dL (A) - may indicate inadequate oral intake of the last few days    MEDICATIONS  Medications reviewed  Novolog (rapid acting)  Thera-vit-m    ANTHROPOMETRICS  Height: 167.6 cm (5' 6\")  Most Recent Weight: 88.2 kg (194 lb 7.1 oz)    IBW: 59 kg  BMI: Obesity Grade I BMI 30-34.9  Weight History:   Wt Readings from Last 10 Encounters:   01/01/23 88.2 kg (194 lb 7.1 oz)   12/01/22 91.5 kg (201 lb 12.8 oz)   08/31/22 93.8 kg (206 lb 11.2 oz)   08/16/22 90.7 kg (200 lb)   08/10/22 93.4 kg (206 lb)   08/04/22 94.4 kg (208 lb 1.6 oz)   06/29/22 95 kg (209 lb 6.4 oz)   05/20/22 95.7 kg (210 lb 15.7 oz)   05/04/22 95.7 kg (211 lb)   03/31/22 95.7 kg (211 lb)   3.6% weight loss in 1 month  Dosing Weight: 66 kg (adjBW " based on IBW and lowest weight this admission)    ASSESSED NUTRITION NEEDS  Estimated Energy Needs: 3184-3834 kcals/day (20 - 25 kcals/kg)  Justification: Obese  Estimated Protein Needs: 79-99 grams protein/day (1.2 - 1.5 grams of pro/kg)  Justification: Increased needs  Estimated Fluid Needs: (1 mL/kcal)   Justification: Maintenance    PHYSICAL FINDINGS  See malnutrition section below.    MALNUTRITION  % Intake: < 75% for > 7 days (moderate)  % Weight Loss: Weight loss does not meet criteria  Subcutaneous Fat Loss: None observed  Muscle Loss: None observed  Fluid Accumulation/Edema: Does not meet criteria  Malnutrition Diagnosis: Patient does not meet two of the established criteria necessary for diagnosing malnutrition    NUTRITION DIAGNOSIS  Inadequate oral intake related to decreased appetite/intake as evidenced by per patient self report and recent weight loss.     INTERVENTIONS  Implementation  Nutrition Education: Provided education on RD role in nutrition POC, nutritional supplements    Medical food supplement therapy     Goals  Patient to consume % of nutritionally adequate meal trays TID, or the equivalent with supplements/snacks.     Monitoring/Evaluation  Progress toward goals will be monitored and evaluated per protocol.    Sandhya Nash, MS/RD/LD  [address] RD pager [phone]  Weekend/Holiday RD pager [phone]

## 2023-01-02 NOTE — PHARMACY-ADMISSION MEDICATION HISTORY
Admission Medication History Completed by Pharmacy    See [facility] Admission Navigator for allergy information, preferred outpatient pharmacy, prior to admission medications and immunization status.     Medication History Sources:     Chart review, sure scripts fill history. Unable to reach patient for interview.    Changes made to PTA medication list (reason):    Added: None    Deleted: Duplicate omega 3 fatty acid and omeprazole orders, old prednisone order    Changed: None    Additional Information:    Fill history is mostly up to date.     Per sure scripts, empagliflozin is new as of 12/12.    Prior to Admission medications    Medication Sig Last Dose Taking? Auth Provider Long Term End Date   albuterol (PROAIR HFA/PROVENTIL HFA/VENTOLIN HFA) 108 (90 Base) MCG/ACT inhaler INHALE 1 OR 2 PUFFS BY MOUTH EVERY 4 HOURS AS NEEDED  Yes Mitzi Nettles APRN CNP Yes    alcohol swab prep pads Use to swab area of injection/xander as directed.  Yes Mitzi Nettles APRN CNP     Alpha-Lipoic Acid 300 MG CAPS Take 300 mg by mouth daily  Yes Mitzi Nettles APRN CNP     amLODIPine (NORVASC) 10 MG tablet Take 1 tablet (10 mg) by mouth daily  Yes Mitzi Nettles APRN CNP Yes    aspirin 81 MG EC tablet Take 1 tablet (81 mg) by mouth daily  Yes Mitzi Nettles APRN CNP     benzonatate (TESSALON) 200 MG capsule Take 1 capsule (200 mg) by mouth 3 times daily as needed for cough  Yes Lynda Spann MD     cetirizine (ZYRTEC) 10 MG tablet Take 1 tablet (10 mg) by mouth daily  Yes Mitzi Nettles APRN CNP     Easy Comfort Lancets MISC   Yes Reported, Patient     empagliflozin (JARDIANCE) 10 MG TABS tablet Take 1 tablet (10 mg) by mouth daily  Yes Zarina Leung MD     fluticasone (FLONASE) 50 MCG/ACT nasal spray Spray 1 spray into both nostrils daily Use at night before bed  Yes Kailee Moralez MD     Fluticasone-Umeclidin-Vilanterol (TRELEGY ELLIPTA) 200-62.5-25 MCG/INH oral inhaler Inhale 1 puff  into the lungs daily  Yes Mitzi Nettles APRN CNP     glipiZIDE (GLUCOTROL) 5 MG tablet Take 1 tablet (5 mg) by mouth 2 times daily (before meals)  Yes Mitzi Nettles APRN CNP Yes    glucosamine-chondroitin 500-400 MG tablet Take 1 tablet by mouth daily  Yes Mitzi Nettles APRN CNP     ipratropium - albuterol 0.5 mg/2.5 mg/3 mL (DUONEB) 0.5-2.5 (3) MG/3ML neb solution Take 1 vial (3 mLs) by nebulization every 6 hours as needed for shortness of breath / dyspnea or wheezing  Yes Lynda Spann MD Yes    ketotifen (ZADITOR) 0.025 % ophthalmic solution Place 1 drop into both eyes daily  Yes Reported, Patient Yes    Lancet Devices (EASY MINI EJECT LANCING DEVICE) MISC   Yes Reported, Patient     multivitamin w/minerals (THERA-VIT-M) tablet Take 1 tablet by mouth daily  Yes Mitzi Nettles APRN CNP     omeprazole (PRILOSEC) 20 MG DR capsule Take 1 capsule (20 mg) by mouth 2 times daily  Yes Travis Briseno MD     polyethylene glycol-propylene glycol (SYSTANE) 0.4-0.3 % SOLN ophthalmic solution Place 1 drop into both eyes 4 times daily  Yes Mitzi Nettles APRN CNP     sodium chloride (NEBUSAL) 3 % neb solution Take 3 mLs by nebulization daily Use 1-2 times daily in combination with Duoneb and Aerobika to help with mucus clearance  Yes Lynda Spann MD     STATIN NOT PRESCRIBED (INTENTIONAL) Please choose reason not prescribed from choices below.  Yes Zarina Leung MD Yes    coenzyme Q-10 (CO-Q10) 50 MG capsule Take 1 capsule (50 mg) by mouth daily   Mitzi Nettles APRN CNP     Continuous Blood Gluc Sensor (FREESTYLE GIOVANNI 14 DAY SENSOR) MISC Change every 14 days.   Mitzi Nettles APRN CNP     cyclobenzaprine (FLEXERIL) 5 MG tablet Take 1 tablet (5 mg) by mouth 3 times daily as needed for muscle spasms   Mitzi Nettles APRN CNP     doxycycline hyclate (VIBRA-TABS) 100 MG tablet Take 1 tablet (100 mg) by mouth 2 times daily   Kailee Moralez MD     omega-3  acid ethyl esters (LOVAZA) 1 g capsule Take 2 capsules (2 g) by mouth every morning AND 2 capsules (2 g) every evening.   Mitzi Nettles APRN CNP         Date completed: 01/02/23    Medication history completed by: Manisha Luna, GamalielD

## 2023-01-03 ENCOUNTER — DOCUMENTATION ONLY (OUTPATIENT)
Dept: OTHER | Facility: CLINIC | Age: 68
End: 2023-01-03

## 2023-01-03 LAB
ANION GAP SERPL CALCULATED.3IONS-SCNC: 11 MMOL/L (ref 7–15)
BACTERIA UR CULT: NORMAL
BUN SERPL-MCNC: 22.6 MG/DL (ref 8–23)
CALCIUM SERPL-MCNC: 9.2 MG/DL (ref 8.8–10.2)
CHLORIDE SERPL-SCNC: 100 MMOL/L (ref 98–107)
CREAT SERPL-MCNC: 0.61 MG/DL (ref 0.51–0.95)
DEPRECATED HCO3 PLAS-SCNC: 26 MMOL/L (ref 22–29)
ERYTHROCYTE [DISTWIDTH] IN BLOOD BY AUTOMATED COUNT: 13.2 % (ref 10–15)
GFR SERPL CREATININE-BSD FRML MDRD: >90 ML/MIN/1.73M2
GLUCOSE BLDC GLUCOMTR-MCNC: 177 MG/DL (ref 70–99)
GLUCOSE BLDC GLUCOMTR-MCNC: 188 MG/DL (ref 70–99)
GLUCOSE BLDC GLUCOMTR-MCNC: 240 MG/DL (ref 70–99)
GLUCOSE BLDC GLUCOMTR-MCNC: 278 MG/DL (ref 70–99)
GLUCOSE SERPL-MCNC: 227 MG/DL (ref 70–99)
HCT VFR BLD AUTO: 44.6 % (ref 35–47)
HGB BLD-MCNC: 13.8 G/DL (ref 11.7–15.7)
MCH RBC QN AUTO: 28.2 PG (ref 26.5–33)
MCHC RBC AUTO-ENTMCNC: 30.9 G/DL (ref 31.5–36.5)
MCV RBC AUTO: 91 FL (ref 78–100)
PLATELET # BLD AUTO: 188 10E3/UL (ref 150–450)
POTASSIUM SERPL-SCNC: 4.4 MMOL/L (ref 3.4–5.3)
RBC # BLD AUTO: 4.9 10E6/UL (ref 3.8–5.2)
SODIUM SERPL-SCNC: 137 MMOL/L (ref 136–145)
WBC # BLD AUTO: 9.5 10E3/UL (ref 4–11)

## 2023-01-03 PROCEDURE — 250N000011 HC RX IP 250 OP 636: Performed by: EMERGENCY MEDICINE

## 2023-01-03 PROCEDURE — 85027 COMPLETE CBC AUTOMATED: CPT | Performed by: PHYSICIAN ASSISTANT

## 2023-01-03 PROCEDURE — 250N000013 HC RX MED GY IP 250 OP 250 PS 637: Performed by: PHYSICIAN ASSISTANT

## 2023-01-03 PROCEDURE — 250N000013 HC RX MED GY IP 250 OP 250 PS 637: Performed by: STUDENT IN AN ORGANIZED HEALTH CARE EDUCATION/TRAINING PROGRAM

## 2023-01-03 PROCEDURE — 36415 COLL VENOUS BLD VENIPUNCTURE: CPT | Performed by: PHYSICIAN ASSISTANT

## 2023-01-03 PROCEDURE — 250N000009 HC RX 250: Performed by: PHYSICIAN ASSISTANT

## 2023-01-03 PROCEDURE — 94640 AIRWAY INHALATION TREATMENT: CPT | Mod: 76

## 2023-01-03 PROCEDURE — 120N000003 HC R&B IMCU UMMC

## 2023-01-03 PROCEDURE — 99233 SBSQ HOSP IP/OBS HIGH 50: CPT | Performed by: STUDENT IN AN ORGANIZED HEALTH CARE EDUCATION/TRAINING PROGRAM

## 2023-01-03 PROCEDURE — 250N000011 HC RX IP 250 OP 636: Performed by: PHYSICIAN ASSISTANT

## 2023-01-03 PROCEDURE — 999N000157 HC STATISTIC RCP TIME EA 10 MIN

## 2023-01-03 PROCEDURE — 99207 PR APP CREDIT; MD BILLING SHARED VISIT: CPT | Performed by: PHYSICIAN ASSISTANT

## 2023-01-03 PROCEDURE — 80048 BASIC METABOLIC PNL TOTAL CA: CPT | Performed by: PHYSICIAN ASSISTANT

## 2023-01-03 PROCEDURE — 94640 AIRWAY INHALATION TREATMENT: CPT

## 2023-01-03 RX ORDER — METHYLPREDNISOLONE SODIUM SUCCINATE 125 MG/2ML
60 INJECTION, POWDER, LYOPHILIZED, FOR SOLUTION INTRAMUSCULAR; INTRAVENOUS EVERY 24 HOURS
Status: DISCONTINUED | OUTPATIENT
Start: 2023-01-03 | End: 2023-01-04 | Stop reason: HOSPADM

## 2023-01-03 RX ORDER — METHYLPREDNISOLONE SODIUM SUCCINATE 125 MG/2ML
60 INJECTION, POWDER, LYOPHILIZED, FOR SOLUTION INTRAMUSCULAR; INTRAVENOUS EVERY 24 HOURS
Status: DISCONTINUED | OUTPATIENT
Start: 2023-01-03 | End: 2023-01-03

## 2023-01-03 RX ADMIN — METHYLPREDNISOLONE SODIUM SUCCINATE 62.5 MG: 125 INJECTION, POWDER, FOR SOLUTION INTRAMUSCULAR; INTRAVENOUS at 08:36

## 2023-01-03 RX ADMIN — CYCLOBENZAPRINE HYDROCHLORIDE 5 MG: 5 TABLET, FILM COATED ORAL at 08:34

## 2023-01-03 RX ADMIN — ASPIRIN 81 MG: 81 TABLET ORAL at 08:34

## 2023-01-03 RX ADMIN — CYCLOBENZAPRINE HYDROCHLORIDE 5 MG: 5 TABLET, FILM COATED ORAL at 20:08

## 2023-01-03 RX ADMIN — CETIRIZINE HYDROCHLORIDE 10 MG: 10 TABLET, FILM COATED ORAL at 08:34

## 2023-01-03 RX ADMIN — IPRATROPIUM BROMIDE AND ALBUTEROL SULFATE 3 ML: 2.5; .5 SOLUTION RESPIRATORY (INHALATION) at 19:43

## 2023-01-03 RX ADMIN — POLYETHYLENE GLYCOL 400 AND PROPYLENE GLYCOL 1 DROP: 4; 3 SOLUTION/ DROPS OPHTHALMIC at 08:32

## 2023-01-03 RX ADMIN — KETOTIFEN FUMARATE 1 DROP: 0.35 SOLUTION/ DROPS OPHTHALMIC at 08:32

## 2023-01-03 RX ADMIN — FLUTICASONE PROPIONATE 1 SPRAY: 50 SPRAY, METERED NASAL at 08:32

## 2023-01-03 RX ADMIN — ENOXAPARIN SODIUM 40 MG: 40 INJECTION SUBCUTANEOUS at 08:41

## 2023-01-03 RX ADMIN — PANTOPRAZOLE SODIUM 40 MG: 40 TABLET, DELAYED RELEASE ORAL at 15:29

## 2023-01-03 RX ADMIN — LEVOFLOXACIN 500 MG: 5 INJECTION, SOLUTION INTRAVENOUS at 06:04

## 2023-01-03 RX ADMIN — POLYETHYLENE GLYCOL 400 AND PROPYLENE GLYCOL 1 DROP: 4; 3 SOLUTION/ DROPS OPHTHALMIC at 20:08

## 2023-01-03 RX ADMIN — AMLODIPINE BESYLATE 10 MG: 10 TABLET ORAL at 08:34

## 2023-01-03 RX ADMIN — PREGABALIN 75 MG: 75 CAPSULE ORAL at 08:34

## 2023-01-03 RX ADMIN — UMECLIDINIUM 1 PUFF: 62.5 AEROSOL, POWDER ORAL at 08:32

## 2023-01-03 RX ADMIN — Medication 1 TABLET: at 08:34

## 2023-01-03 RX ADMIN — POLYETHYLENE GLYCOL 400 AND PROPYLENE GLYCOL 1 DROP: 4; 3 SOLUTION/ DROPS OPHTHALMIC at 11:30

## 2023-01-03 RX ADMIN — PREGABALIN 75 MG: 75 CAPSULE ORAL at 13:41

## 2023-01-03 RX ADMIN — PREGABALIN 75 MG: 75 CAPSULE ORAL at 20:08

## 2023-01-03 RX ADMIN — EMPAGLIFLOZIN 10 MG: 10 TABLET, FILM COATED ORAL at 08:34

## 2023-01-03 RX ADMIN — CYCLOBENZAPRINE HYDROCHLORIDE 5 MG: 5 TABLET, FILM COATED ORAL at 13:41

## 2023-01-03 RX ADMIN — IPRATROPIUM BROMIDE AND ALBUTEROL SULFATE 3 ML: 2.5; .5 SOLUTION RESPIRATORY (INHALATION) at 08:20

## 2023-01-03 RX ADMIN — POLYETHYLENE GLYCOL 400 AND PROPYLENE GLYCOL 1 DROP: 4; 3 SOLUTION/ DROPS OPHTHALMIC at 15:29

## 2023-01-03 RX ADMIN — PANTOPRAZOLE SODIUM 40 MG: 40 TABLET, DELAYED RELEASE ORAL at 08:34

## 2023-01-03 RX ADMIN — IPRATROPIUM BROMIDE AND ALBUTEROL SULFATE 3 ML: 2.5; .5 SOLUTION RESPIRATORY (INHALATION) at 13:04

## 2023-01-03 RX ADMIN — FLUTICASONE FUROATE AND VILANTEROL TRIFENATATE 1 PUFF: 200; 25 POWDER RESPIRATORY (INHALATION) at 08:32

## 2023-01-03 ASSESSMENT — ACTIVITIES OF DAILY LIVING (ADL)
ADLS_ACUITY_SCORE: 33

## 2023-01-03 NOTE — PROGRESS NOTES
I certify that this patient, Jenn Aparicio has been under my care. This is the face-to-face encounter for oxygen medical necessity.      Jenn Aparicio is now in a chronic stable state and continues to require supplemental oxygen. Patient has continued oxygen desaturation due to COPD J44.9.    Alternative treatment(s) tried or considered and deemed clinically infective for treatment of COPD J44.9 include nebulizers, inhalers, steroids and pulmonary toileting.  If portability is ordered, is the patient mobile within the home? yes    Toña Shen PA-C  Hospitalist Service  [facility], [place]  Pager: [phone]

## 2023-01-03 NOTE — PLAN OF CARE
Neuro: A&Ox4. Pt has baseline lower extremity neuropathy. q8 vitals  Cardiac: No tele orders. SR/ST. VSS.   Respiratory: Sating above 94 on 3L NC.  GI/: Adequate urine output. No BM this shift. Last BM 1/30 - prn senna given  Diet/appetite: Reg diet. Eats well. carbs count  Activity:  Independent/SBA. Pt refuses bed alarm  Pain: Neuropathy pain constantly rated at 10/10. Scheduled Lyrica and flexeril.  Skin: No new deficits noted.  LDA's: L PIV and R PIV    Plan: Continue with POC. Notify primary team with changes.

## 2023-01-03 NOTE — PLAN OF CARE
Neuro: A&Ox4.   Cardiac: Afebrile, VSS. No tele orders   Respiratory: 3L NC, lung sounds clear, denies SOB  GI/: Voiding spontaneously. 1 BM this shift.  Diet/appetite: Tolerating regular diet . Denies nausea. BG checks ACHS  Activity: Up independently in room    Pain: Bi-lateral LE neuropathy pain 8/10   Skin: No new deficits noted.  Lines: R and L PIV, SL  Drains: none  Replacements: none    Plan: Continue with current POC. Report changes to primary team.

## 2023-01-03 NOTE — PROGRESS NOTES
[facility]    Medicine Progress Note - Hospitalist Service, GOLD TEAM 6    Date of Admission:  1/1/2023    Assessment & Plan   Jenn Aparicio is a 67 year old female with a past medical history of DM2 with peripheral neuropathy, RLL adenocarcinoma s/p lobectomy in 2016, RUL NSCLC s/p SBRT (1/2020), HTN  COPD with chronic hypoxic respiratory failure requiring 3L at baseline who presents with COPD exacerbation and admitted on 1/1/2023.      Severe COPD with Acute Exacerbation   Acute on Chronic Hypoxic Respiratory Failure, resolved  Lung Nodule  Hx of RLL Adenocarcinoma s/p Lobectomy (2016) - patient presenting with increasing dyspnea and wheezing x 1 week.  Found to be hypoxic by EMS, placed on BIPAP yesterday for work of breathing and oxygen needs. Chest CT negative for PE, but with RUL nodule increasing in size from imaging on 8/31/22.  Otherwise, imaging significant for emphysema. COVID 19, influenza A/B and RSV negative. Full RVP negative. She is now on her home O2 requirements of 3Ls. Suspect COPD exacerbation as etiology of presenting complaints.   -Continue methylprednisolone 60mg daily, plans to discharge on 5 day taper  -Stop antibiotics  -Continue duo-nebs, tessalon, and inhalers per hospital formulary.   -Follow up with Dr. Leung regarding pulmonary nodule at discharge   -Referral to be placed at discharge for pulmonologist.      HTN - Continue PTA amlodipine 10mg daily      Type 2 Diabetes Hgb A1c 9.9  Peripheral neuropathy - Patient with increasing peripheral neuropathy in the past 2 months, preventing ADLs.  She has tried gabapentin in the past with limited relief.  Has initial appointments with PCP and endocrinology this month.   -Continue Lyrica 75mg TID for neuropathic pain  -Continue PTA Jardiance 10mg daily  -Hold Glipizide 5mg BID  -Continue novolog sliding scale. Pending blood sugar trends, may add Lantus  -Follow up with PCP and endocrinology as  "scheduled      GERD   Dysphagia - Continue PTA omeprazole 20mg BID          Diet: Combination Diet Regular Diet Adult  Snacks/Supplements Adult: Ensure Max Protein (bariatric); Between Meals    DVT Prophylaxis: Enoxaparin (Lovenox) SQ  Andrade Catheter: Not present  Lines: None     Cardiac Monitoring: None  Code Status: Full Code      Clinically Significant Risk Factors                       # DMII: A1C = 9.9 % (Ref range: <5.7 %) within past 3 months, PRESENT ON ADMISSION  # Obesity: Estimated body mass index is 31.38 kg/m  as calculated from the following:    Height as of this encounter: 1.676 m (5' 6\").    Weight as of this encounter: 88.2 kg (194 lb 7.1 oz)., PRESENT ON ADMISSION         Disposition Plan     Expected Discharge Date: 01/04/2023      Destination: home  Discharge Comments: Patient on home O2 needs with improvement in dyspnea, inpatient vs outpatient eval of enlarging pulmonary nodules.        The patient's care was discussed with the Attending Physician, Dr. William Gardner.    Toña Shen PA-C  Hospitalist Service, [address]  Securely message with Vocera (more info)  Text page via [facility] Paging/Directory   See signed in provider for up to date coverage information  ______________________________________________________________________    Interval History   All overnight events reviewed. Patient notes she's feeling improved but not yet 100%. She is agreeable to discharging tomorrow.   Denies chest pain, dyspnea, nausea, vomiting, abdominal pain.     Physical Exam   Vital Signs: Temp: 97.7  F (36.5  C) Temp src: Oral BP: 130/74 Pulse: 95   Resp: 18 SpO2: 99 % O2 Device: Nasal cannula Oxygen Delivery: 3 LPM  Weight: 194 lbs 7.13 oz    GENERAL: Alert and oriented x 3. NAD. Ambulatory. Cooperative.   HEENT: Anicteric sclera. Mucous membranes moist. NC. AT.   CV: RRR. S1, S2. No murmurs appreciated.   RESPIRATORY: Effort normal on 3L. Some cough. " Lungs CTAB with no wheezing, rales, rhonchi.   GI: Abdomen soft and non distended with normoactive bowel sounds present in all quadrants. No tendernes   NEUROLOGICAL: No focal deficits. Moves all extremities.   EXTREMITIES: No peripheral edema.   SKIN: No jaundice. No rashes.        Medical Decision Making         Data   Recent Labs   Lab 01/03/23  0458 01/02/23  2154 01/02/23  1735 01/02/23  0714 01/02/23  0516 01/01/23  1241 01/01/23  1059 01/01/23  0843 01/01/23  0843 01/01/23  0450   WBC 9.5  --   --   --  7.0  --   --   --   --  8.6   HGB 13.8  --   --   --  14.2  --   --   --   --  15.3   MCV 91  --   --   --  94  --   --   --   --  93     --   --   --  181  --   --   --   --  242     --   --   --  136  --  139   < > 137  --    POTASSIUM 4.4  --   --   --  4.9  --  4.5   < > 4.6  --    CHLORIDE 100  --   --   --  99  --  96*   < > 98  --    CO2 26  --   --   --  26  --  26   < > 22  --    BUN 22.6  --   --   --  12.2  --  7.3*   < > 6.7*  --    CR 0.61  --   --   --  0.56  --  0.45*   < > 0.45*  --    ANIONGAP 11  --   --   --  11  --  17*   < > 17*  --    LUIGI 9.2  --   --   --  9.1  --  9.1   < > 9.1  --    * 263* 317*   < > 161*   < > 219*   < > 205*  --    ALBUMIN  --   --   --   --   --   --  4.2  --  4.1  --    PROTTOTAL  --   --   --   --   --   --  7.2  --  7.1  --    BILITOTAL  --   --   --   --   --   --  0.2  --  0.3  --    ALKPHOS  --   --   --   --   --   --  101  --  98  --    ALT  --   --   --   --   --   --  7*  --  <5*  --    AST  --   --   --   --   --   --  19  --  21  --     < > = values in this interval not displayed.      Intact

## 2023-01-03 NOTE — PROGRESS NOTES
Home Oxygen Assessment Tools  Patient's 02 sat on RA at rest 88% for 2 minutes. Patient is on 3LPM/%  (02 device) Sp02 92%, after 2 minutes of standing on the side of the bed. If patient's Sp02 at rest is less than 88%, then oxygen may be needed and must monitor patient's Sp02 with activity. Patient 02 85% sat on  RA with activity after 3 minutes. Patient's Sp02 93% on 3LPM/02% after 2 minutes of activity.

## 2023-01-03 NOTE — PLAN OF CARE
Neuro: A&Ox4.   Cardiac: SR. Intermittently tachycardia. VSS.   Respiratory: Sating >90% on NC 3L. Walk test completed- Will keep NC 3L after discharge.  GI/: Adequate urine output. BM X1  Diet/appetite: Tolerating regular diet. Eating well. BG ACHS.  Activity: SBA with walker, up to commode and in halls.  Pain: BLE neuropathy- scheduled Flexeril and Lyrica given. Pt stated Lyrica helps her pain.  Skin: No new deficits noted.  LDA's: L PIV, R PIV    Plan: Continue with POC. Notify primary team with changes.

## 2023-01-03 NOTE — PROGRESS NOTES
Care Management Follow Up    Length of Stay (days): 2    Expected Discharge Date: 01/04/2023?     Concerns to be Addressed: Medical readiness, discharge planning.  Patient plan of care discussed at interdisciplinary rounds: Yes    Anticipated Discharge Disposition: Home  Anticipated Discharge Services: None  Anticipated Discharge DME:  Home O2    Education Provided on the Discharge Plan: Yes  Patient/Family in Agreement with the Plan: Yes    Referrals Placed by CM/SW: DME  Private pay costs discussed: Not applicable    Additional Information:  Per update from COPD RT, patient has been w/[facility] for >5yrs and would like to switch DME providers d/t being dissatisfied w/services from current provider. Home O2 walk test, updated order and face to face needed, bedside nurse and provider aware.     Patient completed the HCD that was provided yesterday, virtual notary requested for sometime mid morning today, will f/u with the patient.     Care coordination will continue to follow for discharge planning.     1057 Addendum:  Virtual notary completed, sent to [facility] for stamp and upload to pt's chart.     1403 Addendum:  Home O2 walk test completed, patient requiring 3L O2 continuously, provider updated, will need new home O2 order.    1446 Addendum:  Home O2 order completed, will contact [facility] tomorrow morning for anticipated discharge and delivery of portable tank.    Stacie Bermeo, RNKIKE, BSN    [facility]    [address]  [address] [place] [id number]    [email or web address]    Office: [phone] Pager: [phone]    To contact the weekend RN[facility]  [place] (0800 - 1630) Saturday and Sunday    Units: 4A, 4C, 4E, 5A and 5B- Pager 1: [phone]    Units: 6A, 6B, 6C, 6D- Pager 2: [phone]    Units: 7A, 7B, 7C, 7D, and 5C-Pager 3: [phone]

## 2023-01-04 ENCOUNTER — APPOINTMENT (OUTPATIENT)
Dept: GENERAL RADIOLOGY | Facility: CLINIC | Age: 68
DRG: 190 | End: 2023-01-04
Attending: PHYSICIAN ASSISTANT
Payer: COMMERCIAL

## 2023-01-04 VITALS
DIASTOLIC BLOOD PRESSURE: 70 MMHG | WEIGHT: 194.45 LBS | HEIGHT: 66 IN | TEMPERATURE: 98.2 F | BODY MASS INDEX: 31.25 KG/M2 | OXYGEN SATURATION: 94 % | HEART RATE: 104 BPM | RESPIRATION RATE: 16 BRPM | SYSTOLIC BLOOD PRESSURE: 128 MMHG

## 2023-01-04 LAB
ANION GAP SERPL CALCULATED.3IONS-SCNC: 12 MMOL/L (ref 7–15)
BUN SERPL-MCNC: 14 MG/DL (ref 8–23)
CALCIUM SERPL-MCNC: 8.9 MG/DL (ref 8.8–10.2)
CHLORIDE SERPL-SCNC: 102 MMOL/L (ref 98–107)
CREAT SERPL-MCNC: 0.54 MG/DL (ref 0.51–0.95)
DEPRECATED HCO3 PLAS-SCNC: 28 MMOL/L (ref 22–29)
ERYTHROCYTE [DISTWIDTH] IN BLOOD BY AUTOMATED COUNT: 13.5 % (ref 10–15)
GFR SERPL CREATININE-BSD FRML MDRD: >90 ML/MIN/1.73M2
GLUCOSE BLDC GLUCOMTR-MCNC: 128 MG/DL (ref 70–99)
GLUCOSE BLDC GLUCOMTR-MCNC: 270 MG/DL (ref 70–99)
GLUCOSE SERPL-MCNC: 151 MG/DL (ref 70–99)
HCT VFR BLD AUTO: 44.9 % (ref 35–47)
HGB BLD-MCNC: 13.9 G/DL (ref 11.7–15.7)
MCH RBC QN AUTO: 28.1 PG (ref 26.5–33)
MCHC RBC AUTO-ENTMCNC: 31 G/DL (ref 31.5–36.5)
MCV RBC AUTO: 91 FL (ref 78–100)
PLATELET # BLD AUTO: 196 10E3/UL (ref 150–450)
POTASSIUM SERPL-SCNC: 3.5 MMOL/L (ref 3.4–5.3)
RBC # BLD AUTO: 4.94 10E6/UL (ref 3.8–5.2)
SODIUM SERPL-SCNC: 142 MMOL/L (ref 136–145)
WBC # BLD AUTO: 8.3 10E3/UL (ref 4–11)

## 2023-01-04 PROCEDURE — 99207 PR APP CREDIT; MD BILLING SHARED VISIT: CPT | Performed by: PHYSICIAN ASSISTANT

## 2023-01-04 PROCEDURE — 94640 AIRWAY INHALATION TREATMENT: CPT

## 2023-01-04 PROCEDURE — 250N000011 HC RX IP 250 OP 636: Performed by: PHYSICIAN ASSISTANT

## 2023-01-04 PROCEDURE — 82947 ASSAY GLUCOSE BLOOD QUANT: CPT | Performed by: PHYSICIAN ASSISTANT

## 2023-01-04 PROCEDURE — 71045 X-RAY EXAM CHEST 1 VIEW: CPT

## 2023-01-04 PROCEDURE — 71046 X-RAY EXAM CHEST 2 VIEWS: CPT | Mod: 26 | Performed by: RADIOLOGY

## 2023-01-04 PROCEDURE — 71046 X-RAY EXAM CHEST 2 VIEWS: CPT

## 2023-01-04 PROCEDURE — 71045 X-RAY EXAM CHEST 1 VIEW: CPT | Mod: 26 | Performed by: RADIOLOGY

## 2023-01-04 PROCEDURE — 36415 COLL VENOUS BLD VENIPUNCTURE: CPT | Performed by: PHYSICIAN ASSISTANT

## 2023-01-04 PROCEDURE — 99239 HOSP IP/OBS DSCHRG MGMT >30: CPT | Performed by: STUDENT IN AN ORGANIZED HEALTH CARE EDUCATION/TRAINING PROGRAM

## 2023-01-04 PROCEDURE — 250N000009 HC RX 250: Performed by: PHYSICIAN ASSISTANT

## 2023-01-04 PROCEDURE — 94640 AIRWAY INHALATION TREATMENT: CPT | Mod: 76

## 2023-01-04 PROCEDURE — 85014 HEMATOCRIT: CPT | Performed by: PHYSICIAN ASSISTANT

## 2023-01-04 PROCEDURE — 250N000013 HC RX MED GY IP 250 OP 250 PS 637: Performed by: PHYSICIAN ASSISTANT

## 2023-01-04 PROCEDURE — 999N000157 HC STATISTIC RCP TIME EA 10 MIN

## 2023-01-04 PROCEDURE — 250N000013 HC RX MED GY IP 250 OP 250 PS 637: Performed by: STUDENT IN AN ORGANIZED HEALTH CARE EDUCATION/TRAINING PROGRAM

## 2023-01-04 RX ORDER — PREGABALIN 75 MG/1
75 CAPSULE ORAL 3 TIMES DAILY
Qty: 90 CAPSULE | Refills: 0 | Status: SHIPPED | OUTPATIENT
Start: 2023-01-04 | End: 2023-01-23 | Stop reason: DRUGHIGH

## 2023-01-04 RX ORDER — LEVOFLOXACIN 500 MG/1
500 TABLET, FILM COATED ORAL ONCE
Status: COMPLETED | OUTPATIENT
Start: 2023-01-04 | End: 2023-01-04

## 2023-01-04 RX ORDER — IPRATROPIUM BROMIDE AND ALBUTEROL SULFATE 2.5; .5 MG/3ML; MG/3ML
1 SOLUTION RESPIRATORY (INHALATION) EVERY 6 HOURS PRN
Qty: 1080 ML | Refills: 0 | Status: ON HOLD | OUTPATIENT
Start: 2023-01-04 | End: 2023-02-09

## 2023-01-04 RX ORDER — IPRATROPIUM BROMIDE AND ALBUTEROL SULFATE 2.5; .5 MG/3ML; MG/3ML
1 SOLUTION RESPIRATORY (INHALATION) EVERY 6 HOURS PRN
Qty: 1080 ML | Refills: 0 | Status: SHIPPED | OUTPATIENT
Start: 2023-01-04 | End: 2023-01-04

## 2023-01-04 RX ORDER — PREDNISONE 20 MG/1
40 TABLET ORAL DAILY
Qty: 10 TABLET | Refills: 0 | Status: SHIPPED | OUTPATIENT
Start: 2023-01-04 | End: 2023-02-04

## 2023-01-04 RX ORDER — LEVOFLOXACIN 500 MG/1
500 TABLET, FILM COATED ORAL DAILY
Qty: 3 TABLET | Refills: 0 | Status: SHIPPED | OUTPATIENT
Start: 2023-01-04 | End: 2023-01-04

## 2023-01-04 RX ORDER — NICOTINE 21 MG/24HR
1 PATCH, TRANSDERMAL 24 HOURS TRANSDERMAL EVERY 24 HOURS
Qty: 21 PATCH | Refills: 0 | Status: SHIPPED | OUTPATIENT
Start: 2023-01-04 | End: 2023-02-04

## 2023-01-04 RX ORDER — PREGABALIN 75 MG/1
75 CAPSULE ORAL 3 TIMES DAILY
Qty: 90 CAPSULE | Refills: 0 | Status: CANCELLED | OUTPATIENT
Start: 2023-01-04

## 2023-01-04 RX ORDER — LEVOFLOXACIN 500 MG/1
500 TABLET, FILM COATED ORAL DAILY
Qty: 3 TABLET | Refills: 0 | Status: SHIPPED | OUTPATIENT
Start: 2023-01-04 | End: 2023-02-04

## 2023-01-04 RX ORDER — PREDNISONE 20 MG/1
40 TABLET ORAL DAILY
Qty: 10 TABLET | Refills: 0 | Status: SHIPPED | OUTPATIENT
Start: 2023-01-04 | End: 2023-01-04

## 2023-01-04 RX ADMIN — CETIRIZINE HYDROCHLORIDE 10 MG: 10 TABLET, FILM COATED ORAL at 07:44

## 2023-01-04 RX ADMIN — KETOTIFEN FUMARATE 1 DROP: 0.35 SOLUTION/ DROPS OPHTHALMIC at 07:51

## 2023-01-04 RX ADMIN — LEVOFLOXACIN 500 MG: 500 TABLET, FILM COATED ORAL at 11:56

## 2023-01-04 RX ADMIN — EMPAGLIFLOZIN 10 MG: 10 TABLET, FILM COATED ORAL at 07:44

## 2023-01-04 RX ADMIN — Medication 1 TABLET: at 07:44

## 2023-01-04 RX ADMIN — IPRATROPIUM BROMIDE AND ALBUTEROL SULFATE 3 ML: 2.5; .5 SOLUTION RESPIRATORY (INHALATION) at 01:35

## 2023-01-04 RX ADMIN — CYCLOBENZAPRINE HYDROCHLORIDE 5 MG: 5 TABLET, FILM COATED ORAL at 14:01

## 2023-01-04 RX ADMIN — UMECLIDINIUM 1 PUFF: 62.5 AEROSOL, POWDER ORAL at 07:46

## 2023-01-04 RX ADMIN — POLYETHYLENE GLYCOL 400 AND PROPYLENE GLYCOL 1 DROP: 4; 3 SOLUTION/ DROPS OPHTHALMIC at 12:01

## 2023-01-04 RX ADMIN — FLUTICASONE FUROATE AND VILANTEROL TRIFENATATE 1 PUFF: 200; 25 POWDER RESPIRATORY (INHALATION) at 07:46

## 2023-01-04 RX ADMIN — PREGABALIN 75 MG: 75 CAPSULE ORAL at 14:01

## 2023-01-04 RX ADMIN — METHYLPREDNISOLONE SODIUM SUCCINATE 62.5 MG: 125 INJECTION, POWDER, FOR SOLUTION INTRAMUSCULAR; INTRAVENOUS at 07:48

## 2023-01-04 RX ADMIN — CYCLOBENZAPRINE HYDROCHLORIDE 5 MG: 5 TABLET, FILM COATED ORAL at 07:43

## 2023-01-04 RX ADMIN — FLUTICASONE PROPIONATE 1 SPRAY: 50 SPRAY, METERED NASAL at 07:46

## 2023-01-04 RX ADMIN — AMLODIPINE BESYLATE 10 MG: 10 TABLET ORAL at 07:44

## 2023-01-04 RX ADMIN — IPRATROPIUM BROMIDE AND ALBUTEROL SULFATE 3 ML: 2.5; .5 SOLUTION RESPIRATORY (INHALATION) at 14:10

## 2023-01-04 RX ADMIN — PANTOPRAZOLE SODIUM 40 MG: 40 TABLET, DELAYED RELEASE ORAL at 07:44

## 2023-01-04 RX ADMIN — ENOXAPARIN SODIUM 40 MG: 40 INJECTION SUBCUTANEOUS at 09:42

## 2023-01-04 RX ADMIN — PREGABALIN 75 MG: 75 CAPSULE ORAL at 07:44

## 2023-01-04 RX ADMIN — ASPIRIN 81 MG: 81 TABLET ORAL at 07:44

## 2023-01-04 RX ADMIN — IPRATROPIUM BROMIDE AND ALBUTEROL SULFATE 3 ML: 2.5; .5 SOLUTION RESPIRATORY (INHALATION) at 07:59

## 2023-01-04 RX ADMIN — POLYETHYLENE GLYCOL 400 AND PROPYLENE GLYCOL 1 DROP: 4; 3 SOLUTION/ DROPS OPHTHALMIC at 07:51

## 2023-01-04 ASSESSMENT — ACTIVITIES OF DAILY LIVING (ADL)
ADLS_ACUITY_SCORE: 35
ADLS_ACUITY_SCORE: 33
ADLS_ACUITY_SCORE: 32
ADLS_ACUITY_SCORE: 35
ADLS_ACUITY_SCORE: 32
ADLS_ACUITY_SCORE: 35
ADLS_ACUITY_SCORE: 32
ADLS_ACUITY_SCORE: 33

## 2023-01-04 NOTE — DISCHARGE SUMMARY
[facility]  Hospitalist Discharge Summary      Date of Admission:  1/1/2023  Date of Discharge:  1/4/2023  Discharging Provider: Toña Shen PA-C  Discharge Service: Hospitalist Service, GOLD TEAM 6    Discharge Diagnoses   Severe COPD with Acute Exacerbation   Acute on Chronic Hypoxic Respiratory Failure, resolved  Lung Nodule  Hx of RLL Adenocarcinoma s/p Lobectomy (2016)  Type 2 Diabetes Hgb A1c 9.9  Peripheral neuropathy  HTN  GERD   Dysphagia       Follow-ups Needed After Discharge   Follow-up Appointments     Adult [facility] Follow-up and recommended labs and tests      Follow up with primary care provider, Mitzi Nettles, within 7 days   for hospital follow- up.  No follow up labs or test are needed.  Follow up with your endocrinologist and your oncologist as previously   scheduled.   A referral to a pulmonologist for management of your COPD was placed       Appointments on [place] and/or [facility] (with [facility] or [facility]   provider or service). Call [phone] if you haven't heard regarding   these appointments within 7 days of discharge.             Unresulted Labs Ordered in the Past 30 Days of this Admission     Date and Time Order Name Status Description    1/1/2023  5:22 AM Blood Culture Peripheral Blood Preliminary     1/1/2023  5:22 AM Blood Culture Peripheral Blood Preliminary       These results will be followed up by PCP    Discharge Disposition   Discharged to home  Condition at discharge: Stable      Hospital Course   Jenn Aparicio is a 67 year old female with a past medical history of DM2 with peripheral neuropathy, RLL adenocarcinoma s/p lobectomy in 2016, RUL NSCLC s/p SBRT (1/2020), HTN  COPD with chronic hypoxic respiratory failure requiring 3L at baseline who presents with a 1 week history of worsening cough and dyspnea.  She was admitted for a COPD exacerbation.  While initially requiring BIPAP, she was able to wean down to her home  oxygen requirements.  She received antibiotics, steroids, and breathing treatments with improvement. The following medical conditions were addressed during her inpatient stay:      Severe COPD with Acute Exacerbation   Acute on Chronic Hypoxic Respiratory Failure, resolved  Lung Nodule, increasing  Hx of RLL Adenocarcinoma s/p Lobectomy (2016) - Patient presenting with increasing dyspnea and wheezing x 1 week.  Found to be hypoxic by EMS, placed on BIPAP in ED for work of breathing and oxygen needs. Chest CT negative for PE, but with RUL nodule increasing in size from imaging on 8/31/22.  Otherwise, imaging significant for emphysema. COVID 19, influenza A/B and RSV negative. Full RVP negative. She is now on her home O2 requirements of 3Ls. Suspect COPD exacerbation as etiology of presenting complaints.   -Continue course of oral prednisone and Levaquin for treatment of her COPD exacerbation   -Continue PTA inhaler regimen, including duo-nebs and albuterol   -Follow up with Dr. Leung regarding pulmonary nodule at discharge. Imaging findings communicated to Dr. Leung via inbox message.   -Referral placed at discharge for pulmonologist for monitoring      HTN - Continue PTA amlodipine 10mg daily      Type 2 Diabetes Hgb A1c 9.9  Peripheral neuropathy - Patient with increasing peripheral neuropathy in the past 2 months, preventing ADLs.  She has tried gabapentin in the past with limited relief.  Has initial appointments with PCP and endocrinology this month. Started trial of Lyrica to see if this provides any relief.   -Continue Lyrica 75mg TID for neuropathic pain.   -Continue PTA Jardiance 10mg daily  -Hold Glipizide 5mg BID while on Levaquin, may resume once complete.   -Follow up with PCP and endocrinology as scheduled      GERD   Dysphagia - Continue PTA omeprazole 20mg BID    Consultations This Hospital Stay   IP RESPIRATORY CARE CHRONIC PULMONARY DISEASE SPECIALIST  CARE MANAGEMENT / SOCIAL WORK IP  CONSULT  SMOKING CESSATION PROGRAM IP CONSULT    Code Status   Full Code    Time Spent on this Encounter   I, Toña Shen PA-C, personally saw the patient today and spent greater than 30 minutes discharging this patient.       DESIRAE Crook [facility] [address]  [address] [place] [id number]  Phone: [phone]  ______________________________________________________________________    Physical Exam   Vital Signs: Temp: 98.2  F (36.8  C) Temp src: Oral BP: 128/70 Pulse: 104   Resp: 16 SpO2: 94 % O2 Device: Nasal cannula Oxygen Delivery: 3 LPM  Weight: 194 lbs 7.13 oz  GENERAL: Alert and oriented x 3. NAD. Ambulatory. Cooperative.   HEENT: Anicteric sclera. Mucous membranes moist. NC. AT.  CV: RRR. S1, S2. No murmurs appreciated.   RESPIRATORY: Effort normal on 3Ls. Lungs with some expiratory wheezes, particularly in LLL  GI: Abdomen soft and non distended with normoactive bowel sounds present in all quadrants. No tenderness, rebound, guarding. No lesions.   NEUROLOGICAL: No focal deficits. Moves all extremities.    EXTREMITIES: No peripheral edema.   SKIN: No jaundice. No rashes.           Primary Care Physician   Mitzi Nettles    Discharge Orders      Pulmonary Medicine Referral      Reason for your hospital stay    You were admitted for increasing shortness of breath and hypoxia.  You were placed on BIPAP (a device to help you breathe easier) and were admitted with a COPD exacerbation.  You were started on steroids, antibiotics and inhalers. You were able to come off the BIPAP and resume your home oxygen.     It was a pleasure meeting with you. Thank you for allowing me and my team the privilege of caring for you. You are the reason we are here, and I truly hope we provided you with the excellent service you deserve. Please let us know if there is anything else we can do for you so that we can be sure you are leaving completely satisfied with your care  experience.      Take care!    Toña Shen PA-C   Hospitalist Service     Activity    Your activity upon discharge: activity as tolerated     When to contact your care team    Call your primary doctor if you have any of the following: temperature >100.4F, increasing shortness of breath, oxygen levels <90%, chest pain, persistent nausea or vomiting     Adult [facility] Follow-up and recommended labs and tests    Follow up with primary care provider, Mitzi Nettles, within 7 days for hospital follow- up.  No follow up labs or test are needed.  Follow up with your endocrinologist and your oncologist as previously scheduled.   A referral to a pulmonologist for management of your COPD was placed       Appointments on [employer] and/or [facility] (with [facility] or [facility] provider or service). Call [phone] if you haven't heard regarding these appointments within 7 days of discharge.     Discharge Instructions    Please do not take your antibiotic (levofloxacin) with your glipizide.  Hold your glipizide until you are finished with the antibiotic in 3 days. Taking both together can cause low blood sugars.   Continue to use your albuterol and duo-nebs as needed at home.     Oxygen Adult/Peds    Oxygen Documentation:   I certify that this patient, Jenn Aparicio has been under my care. This is the face-to-face encounter for oxygen medical necessity.      Jenn Aparicio is now in a chronic stable state and continues to require supplemental oxygen. Patient has continued oxygen desaturation due to COPD J44.9.    Alternative treatment(s) tried or considered and deemed clinically infective for treatment of COPD J44.9 include nebulizers, inhalers, steroids, and pulmonary toileting.  If portability is ordered, is the patient mobile within the home? yes    **Patients who qualify for home O2 coverage under the CMS guidelines require ABG tests or O2 sat readings obtained closest to, but no earlier than 2 days prior to the discharge, as  evidence of the need for home oxygen therapy. Testing must be performed while patient is in the chronic stable state. See notes for O2 sats.**     Diet    Follow this diet upon discharge: diabetic friendly diet       Significant Results and Procedures   Results for orders placed or performed during the hospital encounter of 01/01/23   XR Chest Port 1 View    Narrative    EXAM: XR CHEST PORT 1 VIEW  LOCATION: [facility]  DATE/TIME: 1/1/2023 5:49 AM    INDICATION: Shortness of breath.  COMPARISON: 5/20/2022.    FINDINGS: The heart size is normal. The thoracic aorta is calcified. There are surgical clips in the superior mediastinum. No pneumothorax. There is a right pleural effusion. Nodularity and scarring in the right upper lobe. Mild left basilar atelectasis   or scar.      Impression    IMPRESSION: Right pleural effusion.   CT Chest Pulmonary Embolism w Contrast    Narrative    EXAM: CT CHEST PULMONARY EMBOLISM W CONTRAST 1/1/2023 11:33 AM    HISTORY: 67-year-old female with COPD and acute hypoxic respiratory  failure.    COMPARISON: Same day chest radiograph. Chest CT without contrast  12/1/2022. CT PE 5/20/2022.    TECHNIQUE: CTA imaging obtained through the chest with contrast was  performed. Coronal, sagittal and axial MIP reformatted images  obtained. Images reviewed in soft tissue, lung, and bone windows.    CONTRAST: iopamidol (ISOVUE-370) solution 68 mL.    FINDINGS:   image(s): Noncontributory.    There is good contrast opacification/timing of the pulmonary vessels.  No pulmonary embolus. No findings to suggest right heart strain.    LINES/TUBES: None.    LOWER NECK: Thyroid unremarkable.    LUNG: Post surgical changes of right lower lobectomy. Clustered  micronodularity and adjacent parenchymal consolidation (series 7 image  105) in the posterior right upper lobe, has increased significantly  since 3/31/2022 and 8/31/2022 although unchanged from CT  12/1/2022.  Paraseptal and emphysematous changes and diffuse moderate mosaic  attenuation bilaterally.    AIRWAYS: Patent tracheobronchial tree without intraluminal mass. Mild  diffuse bronchial wall thickening.    PLEURA: No pneumothorax or pleural effusion. No pleural mass or  calcification.    VASCULAR: Main pulmonary artery maximal diameter measures 3.1 cm.  Normal configuration of the great vessels.    CARDIAC: No cardiomegaly. No pericardial effusion. No intracardiac  mass. Mild coronary atherosclerosis.    MEDIASTINUM: No suspicious lymphadenopathy.    CHEST WALL: Unremarkable.    ESOPHAGUS: Unremarkable.    UPPER ABDOMEN: Limited evaluation.    MUSCULOSKELETAL/SOFT TISSUES: Degenerative changes consistent with the  patient's age. No acute osseous abnormality. No suspicious osseous  lesions. Unremarkable soft tissues.    Context: Right lower lobe adenocarcinoma status post lobectomy 2016,  upper lobe non-small cell lung cancer status post SBRT 1/20/2020.      Impression    IMPRESSION:   1.  No pulmonary embolism.  2.  Right upper lobe growing 2.2 x 1.4 cm nodule highly suspicious for  malignancy (given appearance and hx.), increased significantly from  3/31/2022 (9 mm) and 8/31/2022 (2 x 1.0 cm).     3.  Borderline dilated main pulmonary artery, nonspecific are seen in  pulmonary arterial hypertension.  4.  Moderate emphysema.    I have personally reviewed the examination and initial interpretation  and I agree with the findings.    RASHEEDA GARCIA MD         SYSTEM ID:  [id number]   XR Chest Port 1 View    Narrative    EXAMINATION: XR CHEST PORT 1 VIEW, 1/4/2023 9:15 AM    COMPARISON: 1/1/2023    HISTORY: COPD exacerbation; increased cough and wheeze    FINDINGS: Heart size is normal. Emphysematous changes in the lungs.  The right upper lobe nodules seen. No new airspace opacities.      Impression    IMPRESSION: No new airspace opacities.     KAYLA LIND MD         SYSTEM ID:  [id number]   XR Chest 2  Views    Narrative    Chest 2 views 1/4/2023 at 0953 hours    INDICATION: Left lateral lobe wheezes and rhonchi    COMPARISON: 0905 hours today    FINDINGS: Heart size and shape appear normal. Lungs and pulmonary  vasculature appear normal. Bony structures appear grossly intact.      Impression    IMPRESSION: Negative    MARK BARRERA MD         SYSTEM ID:  [id number]       Discharge Medications   Current Discharge Medication List      START taking these medications    Details   levofloxacin (LEVAQUIN) 500 MG tablet Take 1 tablet (500 mg) by mouth daily  Qty: 3 tablet, Refills: 0    Associated Diagnoses: COPD exacerbation (H); Acute and chronic respiratory failure with hypoxia (H)      nicotine (NICODERM CQ) 14 MG/24HR 24 hr patch Place 1 patch onto the skin every 24 hours  Qty: 21 patch, Refills: 0    Associated Diagnoses: Acute and chronic respiratory failure with hypoxia (H)      nicotine (NICOTROL) 10 MG inhaler Use 1 cartridge as needed for urge to smoke by puffing over course of 20min.  Use 6-16 cart/day; reduce number of cart/day over 6-12 weeks.  Qty: 6 each, Refills: 0    Associated Diagnoses: Acute and chronic respiratory failure with hypoxia (H)      predniSONE (DELTASONE) 20 MG tablet Take 2 tablets (40 mg) by mouth daily  Qty: 10 tablet, Refills: 0    Associated Diagnoses: Acute and chronic respiratory failure with hypoxia (H)      pregabalin (LYRICA) 75 MG capsule Take 1 capsule (75 mg) by mouth 3 times daily  Qty: 90 capsule, Refills: 0    Associated Diagnoses: Diabetic polyneuropathy associated with type 2 diabetes mellitus (H)         CONTINUE these medications which have CHANGED    Details   ipratropium - albuterol 0.5 mg/2.5 mg/3 mL (DUONEB) 0.5-2.5 (3) MG/3ML neb solution Take 1 vial (3 mLs) by nebulization every 6 hours as needed for shortness of breath or wheezing  Qty: 1080 mL, Refills: 0    Associated Diagnoses: Acute and chronic respiratory failure with hypoxia (H)         CONTINUE these  medications which have NOT CHANGED    Details   albuterol (PROAIR HFA/PROVENTIL HFA/VENTOLIN HFA) 108 (90 Base) MCG/ACT inhaler INHALE 1 OR 2 PUFFS BY MOUTH EVERY 4 HOURS AS NEEDED  Qty: 18 g, Refills: 3    Comments: Pharmacy may dispense brand covered by insurance (Proair, or proventil or ventolin or generic albuterol inhaler)  Associated Diagnoses: Chronic obstructive pulmonary disease, unspecified COPD type (H)      alcohol swab prep pads Use to swab area of injection/xander as directed.  Qty: 100 each, Refills: 1    Associated Diagnoses: Type 2 diabetes mellitus without complication, without long-term current use of insulin (H)      Alpha-Lipoic Acid 300 MG CAPS Take 300 mg by mouth daily  Qty: 90 capsule, Refills: 3    Comments: Patient had medications at multiple pharmacies and wants to switch over to mail order.  Associated Diagnoses: Essential hypertension; Type 2 diabetes mellitus without complication, without long-term current use of insulin (H)      amLODIPine (NORVASC) 10 MG tablet Take 1 tablet (10 mg) by mouth daily  Qty: 90 tablet, Refills: 3    Associated Diagnoses: Essential hypertension      aspirin 81 MG EC tablet Take 1 tablet (81 mg) by mouth daily  Qty: 90 tablet, Refills: 3    Associated Diagnoses: Essential hypertension; Type 2 diabetes mellitus without complication, without long-term current use of insulin (H)      benzonatate (TESSALON) 200 MG capsule Take 1 capsule (200 mg) by mouth 3 times daily as needed for cough  Qty: 100 capsule, Refills: 3      cetirizine (ZYRTEC) 10 MG tablet Take 1 tablet (10 mg) by mouth daily  Qty: 30 tablet, Refills: 10    Associated Diagnoses: Seasonal allergic rhinitis, unspecified trigger      Easy Comfort Lancets MISC       empagliflozin (JARDIANCE) 10 MG TABS tablet Take 1 tablet (10 mg) by mouth daily  Qty: 30 tablet, Refills: 3    Associated Diagnoses: Type 2 diabetes mellitus with diabetic polyneuropathy, without long-term current use of insulin (H)       fluticasone (FLONASE) 50 MCG/ACT nasal spray Spray 1 spray into both nostrils daily Use at night before bed  Qty: 15.8 mL, Refills: 1    Associated Diagnoses: Seasonal allergic rhinitis, unspecified trigger      Fluticasone-Umeclidin-Vilanterol (TRELEGY ELLIPTA) 200-62.5-25 MCG/INH oral inhaler Inhale 1 puff into the lungs daily  Qty: 28 each, Refills: 5    Associated Diagnoses: Chronic obstructive pulmonary disease, unspecified COPD type (H)      glipiZIDE (GLUCOTROL) 5 MG tablet Take 1 tablet (5 mg) by mouth 2 times daily (before meals)  Qty: 180 tablet, Refills: 3    Associated Diagnoses: Type 2 diabetes mellitus without complication, without long-term current use of insulin (H)      glucosamine-chondroitin 500-400 MG tablet Take 1 tablet by mouth daily  Qty: 90 tablet, Refills: 3    Associated Diagnoses: Type 2 diabetes mellitus without complication, without long-term current use of insulin (H)      ketotifen (ZADITOR) 0.025 % ophthalmic solution Place 1 drop into both eyes daily      Lancet Devices (EASY MINI EJECT LANCING DEVICE) MISC       multivitamin w/minerals (THERA-VIT-M) tablet Take 1 tablet by mouth daily  Qty: 30 tablet, Refills: 11    Comments: Patient had medications at multiple pharmacies and wants to switch over to mail order.      omeprazole (PRILOSEC) 20 MG DR capsule Take 1 capsule (20 mg) by mouth 2 times daily  Qty: 180 capsule, Refills: 3    Associated Diagnoses: Gastroesophageal reflux disease, unspecified whether esophagitis present      polyethylene glycol-propylene glycol (SYSTANE) 0.4-0.3 % SOLN ophthalmic solution Place 1 drop into both eyes 4 times daily  Qty: 5 mL, Refills: 11    Comments: Patient had medications at multiple pharmacies and wants to switch over to mail order.  Associated Diagnoses: Dry eye syndrome of both eyes      sodium chloride (NEBUSAL) 3 % neb solution Take 3 mLs by nebulization daily Use 1-2 times daily in combination with Duoneb and Aerobika to help with  mucus clearance  Qty: 250 mL, Refills: 3      STATIN NOT PRESCRIBED (INTENTIONAL) Please choose reason not prescribed from choices below.    Associated Diagnoses: Type 2 diabetes mellitus without complication, without long-term current use of insulin (H)      coenzyme Q-10 (CO-Q10) 50 MG capsule Take 1 capsule (50 mg) by mouth daily  Qty: 90 capsule, Refills: 3    Comments: Patient had medications at multiple pharmacies and wants to switch over to mail order.  Associated Diagnoses: Essential hypertension      Continuous Blood Gluc Sensor (FREESTYLE GIOVANNI 14 DAY SENSOR) MISC Change every 14 days.  Qty: 2 each, Refills: 11    Associated Diagnoses: Type 2 diabetes mellitus without complication, without long-term current use of insulin (H)      cyclobenzaprine (FLEXERIL) 5 MG tablet Take 1 tablet (5 mg) by mouth 3 times daily as needed for muscle spasms  Qty: 30 tablet, Refills: 0    Comments: Patient had medications at multiple pharmacies and wants to switch over to mail order.  Associated Diagnoses: Muscle spasm      doxycycline hyclate (VIBRA-TABS) 100 MG tablet Take 1 tablet (100 mg) by mouth 2 times daily  Qty: 5 tablet, Refills: 3    Associated Diagnoses: Pulmonary emphysema, unspecified emphysema type (H)      omega-3 acid ethyl esters (LOVAZA) 1 g capsule Take 2 capsules (2 g) by mouth every morning AND 2 capsules (2 g) every evening.  Qty: 360 capsule, Refills: 2    Associated Diagnoses: Type 2 diabetes mellitus without complication, without long-term current use of insulin (H)           Allergies   Allergies   Allergen Reactions     Interferons Dermatitis     Penicillins Hives     Aspirin      325mg      Colon Care

## 2023-01-04 NOTE — PLAN OF CARE
Goal Outcome Evaluation:      Plan of Care Reviewed With: patient    Overall Patient Progress: improvingOverall Patient Progress: improving    Outcome Evaluation: Baseline O2. Up Independent.    Neuro: A&Ox4.   Cardiac: Afebrile, VSS.            Respiratory: 3L NC, lung sounds clear, denies SOB  GI/: Voiding spontaneously.   Diet/appetite: Tolerating regular diet . Denies nausea.   Activity: Up independently in room    Pain:BLE neuropathy pain   Skin: No new skin issues.  Lines: R and L PIV, SL  Drains: none  Replacements: none     Plan: Continue with current POC. Possible discharge today.

## 2023-01-04 NOTE — PROGRESS NOTES
[facility] RECEIVED INTAKES FOR O2  SPOKE WITH [facility]. PT CURRENTLY HAS TANK SYSTEM WITH [facility] BUT IS OUTSIDE OF 5 YR RUL AND UPHAPPY WITH THEIR SERVICE. PT WILL HAVE TRANSPORT O2 THROUGH [facility] FOR RIDE HOME.  LET RAIN KNOW THAT PT WILL NEED TO CALL [facility] AND SCHEDULE A PICKUP OF THEIR O2 AND CALL US TO SCHEDULE DELIVERY OF OURS IF SHE WANTS TO TRANSFER. LET HER KNOW WE CANNOT PROVIDE O2 TO PATIENT TODAY UNLESS SHE WANTS TO PAY OOP DUE TO INSURANCE NOT GOING TO PAY FOR 2 DME COMPAN[employer] TO SUPPLY O2 AT SAME TIME. SHE WILL LET PT KNOW AND GIVE HER OUR [phone] NUMBER TO CALL WHEN SHE IS READY TO SWAP. ALL PAPER IS SCANNED IN FOR SETUP.

## 2023-01-04 NOTE — DISCHARGE INSTRUCTIONS
**You need to contact  Respiratory for  of current equipment. Once that day/time has been confirmed, please contact [facility] for delivery of new equipment. **    [facility]  Phone: [phone]    [facility]  Phone: [phone]

## 2023-01-04 NOTE — PLAN OF CARE
DISCHARGE                         1/4/2023  3:36 PM  ----------------------------------------------------------------------------  Discharged to: Home  Via: [facility] transport  Accompanied by: self  Discharge Instructions: diet, activity, medications, follow up appointments, and when to call the MD.  Prescriptions: Filled by discharge pharmacy; medication list reviewed & sent with pt  Follow Up Appointments: arranged; information given  Belongings: All sent with pt  IV: d/c'd  Telemetry: d/c'd  Pt exhibits understanding of above discharge instructions; all questions answered.    Discharge Paperwork: Signed, copied, and sent home with patient.       Status: A&O. Temp: 98.2  F (36.8  C) Temp src: Oral BP: 128/70 Pulse: 104 Resp: 16 SpO2: 94 % O2 Device: Nasal cannula Oxygen Delivery: 3 LPM (baseline). RUSSELL, O2 at baseline. BLE neuropathy pain endorsed, improved with Lyrica. Voiding adequately, no GI complaints. Showered mostly independent on shift. Eating well. Pt eager to return home and demonstrated understanding of discharge education.         Problem: Plan of Care  Goal: Plan of Care Review  Outcome: Met     Problem: Plan of Care   Goal: Readiness for Transition of Care  Outcome: Met     Problem: Breathing Pattern Ineffective  Goal: Effective Breathing Pattern  Outcome: Met    Problem: Diabetes Comorbidity  Goal: Blood Glucose Level Within Targeted Range  Outcome: Adequate for Care Transition

## 2023-01-04 NOTE — PROGRESS NOTES
Care Management Discharge Note    Discharge Date: 01/04/2023     Discharge Disposition: Home    Discharge Services:  None    Discharge DME: Home O2    Discharge Transportation: [facility] ride?     Private pay costs discussed: Not applicable    Education Provided on the Discharge Plan:  Yes  Persons Notified of Discharge Plans: Patient, [facility]  Patient/Family in Agreement with the Plan: Yes    Additional Information:  Per the primary team, patient is medically ready for discharge today after pending chest XR. Patient is wanting to switch DME provider for home O2. Call out to [facility] to set up/deliver portable O2 to the hospital for discharge, awaiting call back.     Patient notes that she will need transportation at discharge. Call out to [facility] at this time to confirm they are providing transportation today d/t the weather. Writer requested a ride for 3pm today, no ride options available d/t providers saying they are not providing rides today d/t the weather conditions. [facility] spoke w/manager, recommended patient take a non-emergency ambulance ride home. Writer noted that that patient does not need a non-emergent stretcher ride, will attempt to arrange a wheelchair transport ride w/[facility].     Wheelchair transport w/O2 scheduled for 3:30pm today.     [facility] (home O2, 3L continuously)   Phone: [phone]    123 Addendum:  Additional call out to [facility], awaiting call back from answering service.     1257 Addendum:  [facility] called back, they are unable to provide new home O2 while another DME provider's equipment is in the home. Patient will need to contact  [facility] to scheduled  of equipment and will need to coordinate w/[facility] on the same day for delivery. Contact information for  [facility] and [facility] has been provided to patient. Bedside nurse updated on w/c ride.    Stacie Bermeo, RNCC,  BSN    [facility]    [address]  [address] [place] [address]    [email or web address]  [email or web address]    Office: [phone] Pager: [phone]    To contact the [facility] ([phone]) Saturday and Sunday    Units: 4A, 4C, 4E, 5A and 5B- Pager 1: [phone]    Units: 6A, 6B, 6C, 6D- Pager 2: [phone]    Units: 7A, 7B, 7C, 7D, and 5C-Pager 3: [phone]

## 2023-01-05 ENCOUNTER — PATIENT OUTREACH (OUTPATIENT)
Dept: ONCOLOGY | Facility: CLINIC | Age: 68
End: 2023-01-05

## 2023-01-05 NOTE — PROGRESS NOTES
[facility]: Post-Discharge Note  SITUATION                                                      Admission:    Admission Date: 01/01/23   Reason for Admission: Shortness of breath; hypoxia  Discharge:   Discharge Date: 01/04/23  Discharge Diagnosis: COPD Exacerbation    BACKGROUND                                                      Per hospital discharge summary and inpatient provider notes.    ASSESSMENT      At the time of my call, patient is audibly short of breath, having trouble completing full sentences, and the slightest physical activity makes her significantly more dyspneic. She does have an SpO2 monitor at home though it is old and she is unsure of its accuracy. During my call I asked her to turn the machine and test her O2. She reports that the SpO2 reads 81% and her HR reads 124 BPM. She confirms that she is receiving supplemental O2 at 3L/min via nasal cannula.    She has not taken her prednisone today - I have asked that she take this right now. She did try a DuoNeb within the last 20 min of my call but has not noticed an improvement.    [facility] is bringing new equipment this morning.     Discharge Assessment  How are you doing now that you are home?: Patient is audibly short of breath on the phone. Unable to complete full sentences. Slightest activity causes significant dyspenia.  How are your symptoms? (Red Flag symptoms escalate to triage hotline per guidelines): Worsening  Do you feel your condition is stable enough to be safe at home until your provider visit?: No (see comment) (Patient has worsening shortness of breath)  Does the patient have their discharge instructions? : Yes  Does the patient have questions regarding their discharge instructions? : No  Were you started on any new medications or were there changes to any of your previous medications? : Yes (Prednisone; Levaquin; DuoNeb)  Does the patient have all of their medications?: Yes  Do you have questions regarding any of your  medications? : No  Do you have all of your needed medical supplies or equipment (DME)?  (i.e. oxygen tank, CPAP, cane, etc.): No - What equipment or supplies are needed? ([facility] is bring new O2 equipment today; Does have tank at time of call at 3L/min; Does have SpO2 monitor but unsure of its accuracy.)  Discharge follow-up appointment scheduled within 14 calendar days? : Yes  Discharge Follow Up Appointment Date: 01/14/23  Discharge Follow Up Appointment Scheduled with?: Primary Care Provider    PLAN                                                      Outpatient Plan:  I have asked that the patient report back to the ER for evaluation. She does not have anyone to take her to the ER at this time. The patient would like to give time for the prednisone and the DuoNeb to help.     I have paged the discharging provider, Emily Shen PA-C and awaiting call back.     I called and spoke to the patient's emergency contact, Maureen. I explained the situation and the recommendation for ER evaluation. She is not with her mother right now but does live very close by. She is going to head over to her APT to check on her and asked that I call her back in about 30 mins. I will call her back as requested.    ADDENDUM 10:43 AM   Called to speak to the patient's daughter. She was present with Jenn at the time of my call.  Home Medical has dropped off a new concentrator and an SpO2 monitor. Her SpO2 is reading at 90%. She is starting to feel the affects of the prednisone working. The patient still feels well enough to stay home and does not want to go to the ER.     I have asked that she call or go directly to the ER if her symptoms worsen or her SpO2 drops below 88% without recovery. She will continue to monitor. Confirmed she and Maureen have our contact number.    I will plan to follow up with her again tomorrow.    Future Appointments   Date Time Provider Department Center   1/12/2023 11:00 AM Nithya Narvaez RN UCDIA  [facility]   1/14/2023  9:30 AM Mitzi Nettles APRN CNP [facility]   1/23/2023  9[facility]   1/23/2023  9:30 AM [facility]   3/1/2023  3:00 PM [email or web address] CCT [facility]   3/1/2023  4:00 PM Zarina Leung MD [facility]     For any urgent concerns, please contact our 24 hour clinic line:   [place]: [phone]     Rodrigo Armstrong RN    ADDENDUM 01/06/23 2:39 PM  I called the patient to check back in. She continues to struggle with her breathing and is now starting to have some chest pains. She is with her daughter and they both feel it is best for her to be re-evaluated in the ER. They are having trouble securing a ride. I recommended that she call 911 to be evaluated by the EMT/Parametic team and transported to the ER for evaluation. She voiced understanding and agrees. She will call 911 as recommended. We will continue to follow her course.  Rodrigo Armstrong DNP, RN, OCN  RN Care Coordinator  [facility]

## 2023-01-06 LAB
BACTERIA BLD CULT: NO GROWTH
BACTERIA BLD CULT: NO GROWTH

## 2023-01-10 ENCOUNTER — TELEPHONE (OUTPATIENT)
Dept: INTERNAL MEDICINE | Facility: CLINIC | Age: 68
End: 2023-01-10

## 2023-01-10 NOTE — TELEPHONE ENCOUNTER
[facility] Call Center    Phone Message    May a detailed message be left on voicemail: yes     Reason for Call: Other:  Jessica ochoa is needing some codes for this form, she is wanting a call back as soon as possible      Action Taken: Message routed to:  [facility] pcp    Travel Screening: Not Applicable

## 2023-01-17 DIAGNOSIS — J30.2 SEASONAL ALLERGIC RHINITIS, UNSPECIFIED TRIGGER: ICD-10-CM

## 2023-01-17 RX ORDER — FLUTICASONE PROPIONATE 50 MCG
1 SPRAY, SUSPENSION (ML) NASAL DAILY
Qty: 15.8 ML | Refills: 7 | Status: SHIPPED | OUTPATIENT
Start: 2023-01-17 | End: 2023-05-04

## 2023-01-18 DIAGNOSIS — E11.9 TYPE 2 DIABETES MELLITUS WITHOUT COMPLICATION, WITHOUT LONG-TERM CURRENT USE OF INSULIN (H): ICD-10-CM

## 2023-01-23 ENCOUNTER — TELEPHONE (OUTPATIENT)
Dept: ENDOCRINOLOGY | Facility: CLINIC | Age: 68
End: 2023-01-23

## 2023-01-23 ENCOUNTER — TELEPHONE (OUTPATIENT)
Dept: EDUCATION SERVICES | Facility: CLINIC | Age: 68
End: 2023-01-23

## 2023-01-23 ENCOUNTER — VIRTUAL VISIT (OUTPATIENT)
Dept: ENDOCRINOLOGY | Facility: CLINIC | Age: 68
End: 2023-01-23
Attending: INTERNAL MEDICINE
Payer: COMMERCIAL

## 2023-01-23 ENCOUNTER — PRE VISIT (OUTPATIENT)
Dept: ENDOCRINOLOGY | Facility: CLINIC | Age: 68
End: 2023-01-23

## 2023-01-23 ENCOUNTER — LAB (OUTPATIENT)
Dept: LAB | Facility: CLINIC | Age: 68
End: 2023-01-23
Attending: INTERNAL MEDICINE
Payer: COMMERCIAL

## 2023-01-23 DIAGNOSIS — E11.65 TYPE 2 DIABETES MELLITUS WITH HYPERGLYCEMIA, WITHOUT LONG-TERM CURRENT USE OF INSULIN (H): Primary | ICD-10-CM

## 2023-01-23 DIAGNOSIS — B37.31 CANDIDAL VULVOVAGINITIS: Primary | ICD-10-CM

## 2023-01-23 DIAGNOSIS — C34.91 ADENOCARCINOMA, LUNG, RIGHT (H): ICD-10-CM

## 2023-01-23 DIAGNOSIS — E11.42 TYPE 2 DIABETES MELLITUS WITH DIABETIC POLYNEUROPATHY, WITHOUT LONG-TERM CURRENT USE OF INSULIN (H): ICD-10-CM

## 2023-01-23 DIAGNOSIS — E11.42 DIABETIC PERIPHERAL NEUROPATHY (H): ICD-10-CM

## 2023-01-23 LAB
ALBUMIN SERPL BCG-MCNC: 4.2 G/DL (ref 3.5–5.2)
ALP SERPL-CCNC: 106 U/L (ref 35–104)
ALT SERPL W P-5'-P-CCNC: 9 U/L (ref 10–35)
ANION GAP SERPL CALCULATED.3IONS-SCNC: 7 MMOL/L (ref 7–15)
AST SERPL W P-5'-P-CCNC: 16 U/L (ref 10–35)
BASOPHILS # BLD AUTO: 0.1 10E3/UL (ref 0–0.2)
BASOPHILS NFR BLD AUTO: 1 %
BILIRUB SERPL-MCNC: 0.3 MG/DL
BUN SERPL-MCNC: 4.4 MG/DL (ref 8–23)
CALCIUM SERPL-MCNC: 9.4 MG/DL (ref 8.8–10.2)
CHLORIDE SERPL-SCNC: 105 MMOL/L (ref 98–107)
CREAT SERPL-MCNC: 0.49 MG/DL (ref 0.51–0.95)
DEPRECATED HCO3 PLAS-SCNC: 32 MMOL/L (ref 22–29)
EOSINOPHIL # BLD AUTO: 0.3 10E3/UL (ref 0–0.7)
EOSINOPHIL NFR BLD AUTO: 5 %
ERYTHROCYTE [DISTWIDTH] IN BLOOD BY AUTOMATED COUNT: 13.3 % (ref 10–15)
GFR SERPL CREATININE-BSD FRML MDRD: >90 ML/MIN/1.73M2
GLUCOSE SERPL-MCNC: 153 MG/DL (ref 70–99)
HBA1C MFR BLD: 9.4 %
HCT VFR BLD AUTO: 48.2 % (ref 35–47)
HGB BLD-MCNC: 14.9 G/DL (ref 11.7–15.7)
IMM GRANULOCYTES # BLD: 0 10E3/UL
IMM GRANULOCYTES NFR BLD: 0 %
LYMPHOCYTES # BLD AUTO: 1.5 10E3/UL (ref 0.8–5.3)
LYMPHOCYTES NFR BLD AUTO: 22 %
MCH RBC QN AUTO: 28.3 PG (ref 26.5–33)
MCHC RBC AUTO-ENTMCNC: 30.9 G/DL (ref 31.5–36.5)
MCV RBC AUTO: 92 FL (ref 78–100)
MONOCYTES # BLD AUTO: 0.4 10E3/UL (ref 0–1.3)
MONOCYTES NFR BLD AUTO: 6 %
NEUTROPHILS # BLD AUTO: 4.4 10E3/UL (ref 1.6–8.3)
NEUTROPHILS NFR BLD AUTO: 66 %
NRBC # BLD AUTO: 0 10E3/UL
NRBC BLD AUTO-RTO: 0 /100
PLATELET # BLD AUTO: 188 10E3/UL (ref 150–450)
POTASSIUM SERPL-SCNC: 4 MMOL/L (ref 3.4–5.3)
PROT SERPL-MCNC: 7.3 G/DL (ref 6.4–8.3)
RBC # BLD AUTO: 5.27 10E6/UL (ref 3.8–5.2)
SODIUM SERPL-SCNC: 144 MMOL/L (ref 136–145)
WBC # BLD AUTO: 6.6 10E3/UL (ref 4–11)

## 2023-01-23 PROCEDURE — 36415 COLL VENOUS BLD VENIPUNCTURE: CPT

## 2023-01-23 PROCEDURE — 85025 COMPLETE CBC W/AUTO DIFF WBC: CPT

## 2023-01-23 PROCEDURE — 83036 HEMOGLOBIN GLYCOSYLATED A1C: CPT

## 2023-01-23 PROCEDURE — 80053 COMPREHEN METABOLIC PANEL: CPT

## 2023-01-23 PROCEDURE — 99205 OFFICE O/P NEW HI 60 MIN: CPT | Mod: 95 | Performed by: INTERNAL MEDICINE

## 2023-01-23 RX ORDER — ATORVASTATIN CALCIUM 20 MG/1
1 TABLET, FILM COATED ORAL DAILY
COMMUNITY
Start: 2023-01-12 | End: 2023-02-04

## 2023-01-23 RX ORDER — PREGABALIN 150 MG/1
150 CAPSULE ORAL 2 TIMES DAILY
Qty: 60 CAPSULE | Refills: 3 | Status: SHIPPED | OUTPATIENT
Start: 2023-01-23 | End: 2023-03-13

## 2023-01-23 RX ORDER — OMEGA-3-ACID ETHYL ESTERS 1 G/1
2 CAPSULE, LIQUID FILLED ORAL 2 TIMES DAILY
Qty: 360 CAPSULE | Refills: 0 | Status: SHIPPED | OUTPATIENT
Start: 2023-01-23 | End: 2023-04-26

## 2023-01-23 NOTE — NURSING NOTE
Chief Complaint   Patient presents with     Blood Draw     Labs drawn via  by rn in lab.     Labs collected from venipuncture by RN.     Edy Soliman RN

## 2023-01-23 NOTE — TELEPHONE ENCOUNTER
Prior Authorization Approval    Authorization Effective Date:  12/24/2022  Authorization Expiration Date:  1/22/2026  Medication: Freestyle -pa pending   Approved Dose/Quantity: uud  Reference #: Key: [id number]   Insurance Company:    Expected CoPay:       CoPay Card Available:      Foundation Assistance Needed:    Which Pharmacy is filling the prescription (Not needed for infusion/clinic administered):    Pharmacy Notified:    Patient Notified:

## 2023-01-23 NOTE — TELEPHONE ENCOUNTER
OMEGA 3 ACID ETHYL ESTERS CAPS 1GM      Last Written Prescription Date:  6/13/22  Last Fill Quantity: 360,   # refills: 2  Last Office Visit : 1/27/22  Future Office visit:  none    Routing refill request to provider for review/approval because:    Abnormal ALT

## 2023-01-23 NOTE — TELEPHONE ENCOUNTER
Patient believes she has a yeast infection due to vaginal itching.    She is asking that Dr. Shabazz prescribe something as she refilled some of her other Rx's and she does not currently have a primary MD.

## 2023-01-23 NOTE — TELEPHONE ENCOUNTER
Spoke to pt over the phone 1/23/23 and scheduled appointment with Nithya Narvaez per Dr. Shabazz. Sent itinerary to pt via mail per pt request.

## 2023-01-23 NOTE — TELEPHONE ENCOUNTER
----- Message from La Nena Shabazz MD sent at 1/23/2023 10:27 AM CST -----      [facility] endocrine coordinators,    Would you please help patient's schedule follow-up with Nithya Narvaez in 1 to 2 months for follow-up on diabetes education and glucose review.    Thank you very much,  Felisha

## 2023-01-23 NOTE — PROGRESS NOTES
ENDOCRINOLOGY VIDEO VISIT NEW        HISTORY OF PRESENT ILLNESS    Jenn Aparicio is a 67 year old female who is being evaluated via a billable video visit. The patient is seen in consultation at the request of Dr. Leung for diabetes.    Ms. Aparicio is uncertain when she was diagnosed with diabetes: Progress Notes as far back as 2008 mention diagnosis of type 2 diabetes.    She previously was seen in endocrinology at [facility].  I am not able to access prior records via Care Everywhere.    Current diabetes regimen:  -Jardiance 10 mg daily (initiated by Dr. Leung in 12/2022 after discussion with me)  -Glipizide 5 mg twice daily (this was held while patient was on levofloxacin for recent COPD exacerbation and patient confirms she has resumed this)    Past diabetes medications:  -Metformin, developed diarrhea, has tried both extended release and short acting formulations  -Januvia appeared on medication list previously, does not recall side effect with this  -Insulin, recalls developing hypoglycemia prompting discontinuation    Diabetes related complications:  -Peripheral neuropathy, trial of Lyrica 75 mg 3 times daily was initiated during her recent hospitalization earlier this month; did not notice significant improvement in peripheral neuropathic symptoms (primarily burning, sharp and aching sensation in feet)    She has most recently met with [facility] in 12/2022: In addition to comprehensive education, a sample freestyle marlon CGM was initiated.  She liked having freestyle marlon and would like to explore using this in the future.    Has not been checking fingerstick at all in the past week, when she does check in frequently she notes glucose values run in the upper 100s.  No recurrent hypoglycemia.  May develop hypoglycemia if she does not eat for an extended period of time.    Typically has 1-2 meals per day.  Breakfast is usually tea or coffee and juice and perhaps a small food item.  Dinner is  her largest meal of the day.  In addition to juice, she drinks regular soda.    Ms. Aparicio was most recently hospitalized between 1/1/2023 and 1/4/2023 with COPD exacerbation: She received steroids in addition to antibiotics for this.    Her medical history is also notable for COPD (on home oxygen), hypertension, GERD with dysphagia, history of right lower lobe adenocarcinoma of the lung (post lobectomy in 2016) and right upper lobe non-small cell lung cancer (post radiation therapy in 2020).  A right upper lobe lung nodule was noted to be increasing in size on chest CT during her recent hospitalization.    PAST MEDICAL HISTORY  Past Medical History:   Diagnosis Date     Adenocarcinoma, lung (H)      Asthma      Ectopic pregnancy      Esophageal reflux      Pulmonary emphysema (H)     Very severe FEV1<30% predicted     Type II diabetes mellitus (H)        MEDICATIONS  Current Outpatient Medications   Medication Sig Dispense Refill     albuterol (PROAIR HFA/PROVENTIL HFA/VENTOLIN HFA) 108 (90 Base) MCG/ACT inhaler INHALE 1 OR 2 PUFFS BY MOUTH EVERY 4 HOURS AS NEEDED 18 g 3     alcohol swab prep pads Use to swab area of injection/xander as directed. 100 each 1     Alpha-Lipoic Acid 300 MG CAPS Take 300 mg by mouth daily 90 capsule 3     amLODIPine (NORVASC) 10 MG tablet Take 1 tablet (10 mg) by mouth daily 90 tablet 3     aspirin 81 MG EC tablet Take 1 tablet (81 mg) by mouth daily 90 tablet 3     benzonatate (TESSALON) 200 MG capsule Take 1 capsule (200 mg) by mouth 3 times daily as needed for cough 100 capsule 3     cetirizine (ZYRTEC) 10 MG tablet Take 1 tablet (10 mg) by mouth daily 30 tablet 10     coenzyme Q-10 (CO-Q10) 50 MG capsule Take 1 capsule (50 mg) by mouth daily 90 capsule 3     Continuous Blood Gluc Sensor (FREESTYLE GIOVANNI 14 DAY SENSOR) MISC Change every 14 days. 2 each 11     cyclobenzaprine (FLEXERIL) 5 MG tablet Take 1 tablet (5 mg) by mouth 3 times daily as needed for muscle spasms 30 tablet 0      doxycycline hyclate (VIBRA-TABS) 100 MG tablet Take 1 tablet (100 mg) by mouth 2 times daily 5 tablet 3     Easy Comfort Lancets MISC        empagliflozin (JARDIANCE) 10 MG TABS tablet Take 1 tablet (10 mg) by mouth daily 30 tablet 3     fluticasone (FLONASE) 50 MCG/ACT nasal spray Spray 1 spray into both nostrils daily Use at night before bed 15.8 mL 7     Fluticasone-Umeclidin-Vilanterol (TRELEGY ELLIPTA) 200-62.5-25 MCG/INH oral inhaler Inhale 1 puff into the lungs daily 28 each 5     glipiZIDE (GLUCOTROL) 5 MG tablet Take 1 tablet (5 mg) by mouth 2 times daily (before meals) 180 tablet 3     glucosamine-chondroitin 500-400 MG tablet Take 1 tablet by mouth daily 90 tablet 3     ipratropium - albuterol 0.5 mg/2.5 mg/3 mL (DUONEB) 0.5-2.5 (3) MG/3ML neb solution Take 1 vial (3 mLs) by nebulization every 6 hours as needed for shortness of breath or wheezing 1080 mL 0     ketotifen (ZADITOR) 0.025 % ophthalmic solution Place 1 drop into both eyes daily       Lancet Devices (EASY MINI EJECT LANCING DEVICE) MISC        levofloxacin (LEVAQUIN) 500 MG tablet Take 1 tablet (500 mg) by mouth daily 3 tablet 0     multivitamin w/minerals (THERA-VIT-M) tablet Take 1 tablet by mouth daily 30 tablet 11     nicotine (NICODERM CQ) 14 MG/24HR 24 hr patch Place 1 patch onto the skin every 24 hours 21 patch 0     nicotine (NICOTROL) 10 MG inhaler Use 1 cartridge as needed for urge to smoke by puffing over course of 20min.  Use 6-16 cart/day; reduce number of cart/day over 6-12 weeks. 6 each 0     omega-3 acid ethyl esters (LOVAZA) 1 g capsule Take 2 capsules (2 g) by mouth every morning AND 2 capsules (2 g) every evening. 360 capsule 2     omeprazole (PRILOSEC) 20 MG DR capsule Take 1 capsule (20 mg) by mouth 2 times daily 180 capsule 3     polyethylene glycol-propylene glycol (SYSTANE) 0.4-0.3 % SOLN ophthalmic solution Place 1 drop into both eyes 4 times daily 5 mL 11     predniSONE (DELTASONE) 20 MG tablet Take 2 tablets (40 mg)  by mouth daily 10 tablet 0     pregabalin (LYRICA) 75 MG capsule Take 1 capsule (75 mg) by mouth 3 times daily 90 capsule 0     sodium chloride (NEBUSAL) 3 % neb solution Take 3 mLs by nebulization daily Use 1-2 times daily in combination with Duoneb and Aerobika to help with mucus clearance 250 mL 3     STATIN NOT PRESCRIBED (INTENTIONAL) Please choose reason not prescribed from choices below.         Allergies, family, and social history were reviewed and documented as needed in EHR.     REVIEW OF SYSTEMS  A focused ROS was performed, with pertinent positives and negatives as noted in the HPI.    PHYSICAL EXAM  There were no vitals taken for this visit.  There is no height or weight on file to calculate BMI.  Constitutional: Patient is alert, oriented and appears in no acute distress.  Eyes: Eyes grossly normal to inspection, EOMI, no stare, lid lag, or retraction; no conjunctival injection.  ENMT: Lips are without lesions.   Neck: No visible goiter or neck mass.  Respiratory: No audible wheeze or cough. No visible cyanosis. No visible increased work of breathing.  Neurological: Alert and oriented times 3.  Cranial nerves grossly intact.      DATA REVIEW  Each of the following laboratory and/or imaging studies were reviewed.    Component      Latest Ref Rng & Units 1/1/2023 1/3/2023   Sodium      136 - 145 mmol/L  137   Potassium      3.4 - 5.3 mmol/L  4.4   Chloride      98 - 107 mmol/L  100   Carbon Dioxide (CO2)      22 - 29 mmol/L  26   Anion Gap      7 - 15 mmol/L  11   Urea Nitrogen      8.0 - 23.0 mg/dL  22.6   Creatinine      0.51 - 0.95 mg/dL  0.61   Calcium      8.8 - 10.2 mg/dL  9.2   Glucose      70 - 99 mg/dL  227 (H)   GFR Estimate      >60 mL/min/1.73m2  >90   Hemoglobin A1C      <5.7 % 9.9 (H)        ASSESSMENT  1.  Diabetes mellitus, type 2.  No recent glucose data however prior CGM data and A1c indicate persistent hyperglycemia.  Would increase Jardiance dose since she is tolerating thus far: We  discussed importance of maintaining sufficient hydration and monitoring for  yeast infection.  She has not tolerated metformin in the past.  In the long-term, could consider either DPP 4 inhibitor (which she has been on in the past and tolerated) or GLP-1 receptor agonist if hyperglycemia persists.  We also discussed dietary changes that will be helpful in addressing hyperglycemia.  We also reviewed importance of glucose monitoring: She is interested in use of continuous glucose monitor and I will send prescription for freestyle marti, although we discussed that insurance may not cover this if she is not treated with insulin.  Follow-up with diabetes care and  in 1 to 2 months, with me in 3 months.    2.  Diabetes preventive care.  -Diabetic neuropathy, as discussed below  -Check urine microalbumin screen with next lab draw  -Eye exam in our system in 1/2022 did not show diabetic retinopathy; have asked patient to schedule follow-up exam    3.  Diabetic neuropathy.  Limited response to Lyrica, which was prescribed while she was hospitalized.  Increased dose of Lyrica.  Check B12 prior to next visit to rule out other potential contributors to neuropathic symptoms.    4.  Other health maintenance.  She plans to reestablish primary care in the near future: Have encouraged her to do so.    PLAN  -Increase Jardiance to 25 mg daily  -Increase Lyrica to 150 mg twice daily  -I have sent Freestyle Marti continuous glucose monitor prescription to pharmacy, in the event insurance will cover this  -In the meantime, please check fingerstick glucose twice daily, first thing in the morning and before evening meal; also check with any unusual symptoms that could be due to low blood glucose  -Cut back on juice and soda, try flavored sparkling water or eating the actual fruit instead of drinking juice  -We will request that our schedulers make an appointment to visit with Nithya Narvaez in 1-2 months  -Call eye  clinic to schedule eye exam  -Return for a follow-up visit in 3 months (virtual visit); we will plan for in person visit in the summer 2023--labs before visit in 3 months  -We will communicate results by letter, or if needed by phone      Orders Placed This Encounter   Procedures     Comprehensive metabolic panel     Hemoglobin A1c     Albumin Random Urine Quantitative with Creat Ratio     Vitamin B12         Video-Visit Details    Type of service:  Video Visit    Physician location: Offsite    Video Start Time: 9:47 AM  Video End Time: 10:21 AM    I spent a total of 75 minutes on the date of encounter reviewing medical records, evaluating the patient, coordinating care and documenting in the EHR, as detailed above.      Platform used for Video Visit: onefinestay and Mimiboard    La Nena Shabazz MD   Division of Diabetes, Endocrinology and Metabolism  Department of Medicine      cc: Zarina Leung MD; BENOIT Ruiz CNP; Nithya Narvaez RN [facility]

## 2023-01-23 NOTE — LETTER
1/23/2023       RE: Jenn Aparicio  [address]  [place] [address]     Dear Colleague,    Thank you for referring your patient, Jenn Aparicio, to the [facility] ENDOCRINOLOGY [facility]INIC [place] at [facility]. Please see a copy of my visit note below.      ENDOCRINOLOGY VIDEO VISIT NEW        HISTORY OF PRESENT ILLNESS    Jenn Aparicio is a 67 year old female who is being evaluated via a billable video visit. The patient is seen in consultation at the request of Dr. Leung for diabetes.    Ms. Aparicio is uncertain when she was diagnosed with diabetes: Progress Notes as far back as 2008 mention diagnosis of type 2 diabetes.    She previously was seen in endocrinology at [facility].  I am not able to access prior records via Care Everywhere.    Current diabetes regimen:  -Jardiance 10 mg daily (initiated by Dr. Leung in 12/2022 after discussion with me)  -Glipizide 5 mg twice daily (this was held while patient was on levofloxacin for recent COPD exacerbation and patient confirms she has resumed this)    Past diabetes medications:  -Metformin, developed diarrhea, has tried both extended release and short acting formulations  -Januvia appeared on medication list previously, does not recall side effect with this  -Insulin, recalls developing hypoglycemia prompting discontinuation    Diabetes related complications:  -Peripheral neuropathy, trial of Lyrica 75 mg 3 times daily was initiated during her recent hospitalization earlier this month; did not notice significant improvement in peripheral neuropathic symptoms (primarily burning, sharp and aching sensation in feet)    She has most recently met with [facility] Wilks in 12/2022: In addition to comprehensive education, a sample freestyle marlon CGM was initiated.  She liked having freestyle marlon and would like to explore using this in the future.    Has not been checking fingerstick at all in the  past week, when she does check in frequently she notes glucose values run in the upper 100s.  No recurrent hypoglycemia.  May develop hypoglycemia if she does not eat for an extended period of time.    Typically has 1-2 meals per day.  Breakfast is usually tea or coffee and juice and perhaps a small food item.  Dinner is her largest meal of the day.  In addition to juice, she drinks regular soda.    Ms. Aparicio was most recently hospitalized between 1/1/2023 and 1/4/2023 with COPD exacerbation: She received steroids in addition to antibiotics for this.    Her medical history is also notable for COPD (on home oxygen), hypertension, GERD with dysphagia, history of right lower lobe adenocarcinoma of the lung (post lobectomy in 2016) and right upper lobe non-small cell lung cancer (post radiation therapy in 2020).  A right upper lobe lung nodule was noted to be increasing in size on chest CT during her recent hospitalization.    PAST MEDICAL HISTORY  Past Medical History:   Diagnosis Date     Adenocarcinoma, lung (H)      Asthma      Ectopic pregnancy      Esophageal reflux      Pulmonary emphysema (H)     Very severe FEV1<30% predicted     Type II diabetes mellitus (H)        MEDICATIONS  Current Outpatient Medications   Medication Sig Dispense Refill     albuterol (PROAIR HFA/PROVENTIL HFA/VENTOLIN HFA) 108 (90 Base) MCG/ACT inhaler INHALE 1 OR 2 PUFFS BY MOUTH EVERY 4 HOURS AS NEEDED 18 g 3     alcohol swab prep pads Use to swab area of injection/xander as directed. 100 each 1     Alpha-Lipoic Acid 300 MG CAPS Take 300 mg by mouth daily 90 capsule 3     amLODIPine (NORVASC) 10 MG tablet Take 1 tablet (10 mg) by mouth daily 90 tablet 3     aspirin 81 MG EC tablet Take 1 tablet (81 mg) by mouth daily 90 tablet 3     benzonatate (TESSALON) 200 MG capsule Take 1 capsule (200 mg) by mouth 3 times daily as needed for cough 100 capsule 3     cetirizine (ZYRTEC) 10 MG tablet Take 1 tablet (10 mg) by mouth daily 30 tablet 10      coenzyme Q-10 (CO-Q10) 50 MG capsule Take 1 capsule (50 mg) by mouth daily 90 capsule 3     Continuous Blood Gluc Sensor (FREESTYLE GIOVANNI 14 DAY SENSOR) MISC Change every 14 days. 2 each 11     cyclobenzaprine (FLEXERIL) 5 MG tablet Take 1 tablet (5 mg) by mouth 3 times daily as needed for muscle spasms 30 tablet 0     doxycycline hyclate (VIBRA-TABS) 100 MG tablet Take 1 tablet (100 mg) by mouth 2 times daily 5 tablet 3     Easy Comfort Lancets MISC        empagliflozin (JARDIANCE) 10 MG TABS tablet Take 1 tablet (10 mg) by mouth daily 30 tablet 3     fluticasone (FLONASE) 50 MCG/ACT nasal spray Spray 1 spray into both nostrils daily Use at night before bed 15.8 mL 7     Fluticasone-Umeclidin-Vilanterol (TRELEGY ELLIPTA) 200-62.5-25 MCG/INH oral inhaler Inhale 1 puff into the lungs daily 28 each 5     glipiZIDE (GLUCOTROL) 5 MG tablet Take 1 tablet (5 mg) by mouth 2 times daily (before meals) 180 tablet 3     glucosamine-chondroitin 500-400 MG tablet Take 1 tablet by mouth daily 90 tablet 3     ipratropium - albuterol 0.5 mg/2.5 mg/3 mL (DUONEB) 0.5-2.5 (3) MG/3ML neb solution Take 1 vial (3 mLs) by nebulization every 6 hours as needed for shortness of breath or wheezing 1080 mL 0     ketotifen (ZADITOR) 0.025 % ophthalmic solution Place 1 drop into both eyes daily       Lancet Devices (EASY MINI EJECT LANCING DEVICE) MISC        levofloxacin (LEVAQUIN) 500 MG tablet Take 1 tablet (500 mg) by mouth daily 3 tablet 0     multivitamin w/minerals (THERA-VIT-M) tablet Take 1 tablet by mouth daily 30 tablet 11     nicotine (NICODERM CQ) 14 MG/24HR 24 hr patch Place 1 patch onto the skin every 24 hours 21 patch 0     nicotine (NICOTROL) 10 MG inhaler Use 1 cartridge as needed for urge to smoke by puffing over course of 20min.  Use 6-16 cart/day; reduce number of cart/day over 6-12 weeks. 6 each 0     omega-3 acid ethyl esters (LOVAZA) 1 g capsule Take 2 capsules (2 g) by mouth every morning AND 2 capsules (2 g) every  evening. 360 capsule 2     omeprazole (PRILOSEC) 20 MG DR capsule Take 1 capsule (20 mg) by mouth 2 times daily 180 capsule 3     polyethylene glycol-propylene glycol (SYSTANE) 0.4-0.3 % SOLN ophthalmic solution Place 1 drop into both eyes 4 times daily 5 mL 11     predniSONE (DELTASONE) 20 MG tablet Take 2 tablets (40 mg) by mouth daily 10 tablet 0     pregabalin (LYRICA) 75 MG capsule Take 1 capsule (75 mg) by mouth 3 times daily 90 capsule 0     sodium chloride (NEBUSAL) 3 % neb solution Take 3 mLs by nebulization daily Use 1-2 times daily in combination with Duoneb and Aerobika to help with mucus clearance 250 mL 3     STATIN NOT PRESCRIBED (INTENTIONAL) Please choose reason not prescribed from choices below.         Allergies, family, and social history were reviewed and documented as needed in EHR.     REVIEW OF SYSTEMS  A focused ROS was performed, with pertinent positives and negatives as noted in the HPI.    PHYSICAL EXAM  There were no vitals taken for this visit.  There is no height or weight on file to calculate BMI.  Constitutional: Patient is alert, oriented and appears in no acute distress.  Eyes: Eyes grossly normal to inspection, EOMI, no stare, lid lag, or retraction; no conjunctival injection.  ENMT: Lips are without lesions.   Neck: No visible goiter or neck mass.  Respiratory: No audible wheeze or cough. No visible cyanosis. No visible increased work of breathing.  Neurological: Alert and oriented times 3.  Cranial nerves grossly intact.      DATA REVIEW  Each of the following laboratory and/or imaging studies were reviewed.    Component      Latest Ref Rng & Units 1/1/2023 1/3/2023   Sodium      136 - 145 mmol/L  137   Potassium      3.4 - 5.3 mmol/L  4.4   Chloride      98 - 107 mmol/L  100   Carbon Dioxide (CO2)      22 - 29 mmol/L  26   Anion Gap      7 - 15 mmol/L  11   Urea Nitrogen      8.0 - 23.0 mg/dL  22.6   Creatinine      0.51 - 0.95 mg/dL  0.61   Calcium      8.8 - 10.2 mg/dL  9.2    Glucose      70 - 99 mg/dL  227 (H)   GFR Estimate      >60 mL/min/1.73m2  >90   Hemoglobin A1C      <5.7 % 9.9 (H)        ASSESSMENT  1.  Diabetes mellitus, type 2.  No recent glucose data however prior CGM data and A1c indicate persistent hyperglycemia.  Would increase Jardiance dose since she is tolerating thus far: We discussed importance of maintaining sufficient hydration and monitoring for  yeast infection.  She has not tolerated metformin in the past.  In the long-term, could consider either DPP 4 inhibitor (which she has been on in the past and tolerated) or GLP-1 receptor agonist if hyperglycemia persists.  We also discussed dietary changes that will be helpful in addressing hyperglycemia.  We also reviewed importance of glucose monitoring: She is interested in use of continuous glucose monitor and I will send prescription for freestyle marti, although we discussed that insurance may not cover this if she is not treated with insulin.  Follow-up with diabetes care and  in 1 to 2 months, with me in 3 months.    2.  Diabetes preventive care.  -Diabetic neuropathy, as discussed below  -Check urine microalbumin screen with next lab draw  -Eye exam in our system in 1/2022 did not show diabetic retinopathy; have asked patient to schedule follow-up exam    3.  Diabetic neuropathy.  Limited response to Lyrica, which was prescribed while she was hospitalized.  Increased dose of Lyrica.  Check B12 prior to next visit to rule out other potential contributors to neuropathic symptoms.    4.  Other health maintenance.  She plans to reestablish primary care in the near future: Have encouraged her to do so.    PLAN  -Increase Jardiance to 25 mg daily  -Increase Lyrica to 150 mg twice daily  -I have sent Freestyle Marti continuous glucose monitor prescription to pharmacy, in the event insurance will cover this  -In the meantime, please check fingerstick glucose twice daily, first thing in the morning  and before evening meal; also check with any unusual symptoms that could be due to low blood glucose  -Cut back on juice and soda, try flavored sparkling water or eating the actual fruit instead of drinking juice  -We will request that our schedulers make an appointment to visit with Nithya Narvaez in 1-2 months  -Call eye clinic to schedule eye exam  -Return for a follow-up visit in 3 months (virtual visit); we will plan for in person visit in the summer 2023--labs before visit in 3 months  -We will communicate results by letter, or if needed by phone      Orders Placed This Encounter   Procedures     Comprehensive metabolic panel     Hemoglobin A1c     Albumin Random Urine Quantitative with Creat Ratio     Vitamin B12         Video-Visit Details    Type of service:  Video Visit    Physician location: Offsite    Video Start Time: 9:47 AM  Video End Time: 10:21 AM    I spent a total of 75 minutes on the date of encounter reviewing medical records, evaluating the patient, coordinating care and documenting in the EHR, as detailed above.      Platform used for Video Visit: AmWell and Doximity    La Nena Shabazz MD   Division of Diabetes, Endocrinology and Metabolism  Department of Medicine      cc: Zarina Leung MD; BENOIT Ruiz CNP; Nithya Narvaez RN [facility]

## 2023-01-23 NOTE — PATIENT INSTRUCTIONS
-Increase Jardiance to 25 mg daily  -Increase Lyrica to 150 mg twice daily  -I have sent Freestyle Marti continuous glucose monitor prescription to pharmacy, in the event insurance will cover this  -In the meantime, please check fingerstick glucose twice daily, first thing in the morning and before evening meal; also check with any unusual symptoms that could be due to low blood glucose  -Cut back on juice and soda, try flavored sparkling water or eating the actual fruit instead of drinking juice  -We will request that our schedulers make an appointment to visit with Nithya Narvaez in 1-2 months  -Call eye clinic to schedule eye exam  -Return for a follow-up visit in 3 months (virtual visit); we will plan for in person visit in the summer 2023--labs before visit in 3 months  -We will communicate results by letter, or if needed by phone

## 2023-01-24 RX ORDER — FLUCONAZOLE 150 MG/1
150 TABLET ORAL ONCE
Qty: 1 TABLET | Refills: 0 | Status: SHIPPED | OUTPATIENT
Start: 2023-01-24 | End: 2023-01-24

## 2023-01-24 NOTE — TELEPHONE ENCOUNTER
Prescription for one-time dose of fluconazole sent to pharmacy.  Please remind patient that she needs to establish with primary care as we discussed at her appointment.

## 2023-01-25 NOTE — TELEPHONE ENCOUNTER
I called and left message, pt will need in office appointment to be able to get a scooter or wheelchair order.  Ilana De La Garza, EMT at 10:42 AM on 6/30/2021.  [facility]  [place]  [phone]      Pt was just evaluated on 1/24 at urgent care.   I am familiar with pt's pregnancy and risk conditions and history/meds.  Hx of autoimmune hepatitis 3yr ago with normalization of LFTs; recent CMP from Jan normal.   I did review Paxlovid interactions with her meds in the [place] drug interaction website and no significant concerns.     I will send Paxlovid for pt to start today; ok to cancel tomorrow's appt if she doesn't have any further concerns. We have an appt on 2/13 planned as well.    Ok to take tylenol for comfort    Thanks,   Dr. Mckenna

## 2023-02-03 ENCOUNTER — HOSPITAL ENCOUNTER (INPATIENT)
Facility: CLINIC | Age: 68
LOS: 5 days | Discharge: HOME | DRG: 190 | End: 2023-02-09
Attending: EMERGENCY MEDICINE | Admitting: INTERNAL MEDICINE
Payer: COMMERCIAL

## 2023-02-03 ENCOUNTER — APPOINTMENT (OUTPATIENT)
Dept: GENERAL RADIOLOGY | Facility: CLINIC | Age: 68
DRG: 190 | End: 2023-02-03
Attending: EMERGENCY MEDICINE
Payer: COMMERCIAL

## 2023-02-03 DIAGNOSIS — J96.21 ACUTE AND CHRONIC RESPIRATORY FAILURE WITH HYPOXIA (H): Primary | ICD-10-CM

## 2023-02-03 DIAGNOSIS — J44.1 COPD EXACERBATION (H): ICD-10-CM

## 2023-02-03 DIAGNOSIS — F17.210 CIGARETTE SMOKER: ICD-10-CM

## 2023-02-03 DIAGNOSIS — Z11.52 ENCOUNTER FOR SCREENING LABORATORY TESTING FOR SEVERE ACUTE RESPIRATORY SYNDROME CORONAVIRUS 2 (SARS-COV-2): ICD-10-CM

## 2023-02-03 LAB
BASOPHILS # BLD AUTO: 0 10E3/UL (ref 0–0.2)
BASOPHILS NFR BLD AUTO: 1 %
EOSINOPHIL # BLD AUTO: 0.3 10E3/UL (ref 0–0.7)
EOSINOPHIL NFR BLD AUTO: 4 %
ERYTHROCYTE [DISTWIDTH] IN BLOOD BY AUTOMATED COUNT: 13.4 % (ref 10–15)
HCT VFR BLD AUTO: 46.8 % (ref 35–47)
HGB BLD-MCNC: 13.7 G/DL (ref 11.7–15.7)
IMM GRANULOCYTES # BLD: 0 10E3/UL
IMM GRANULOCYTES NFR BLD: 0 %
LACTATE SERPL-SCNC: 0.9 MMOL/L (ref 0.7–2)
LYMPHOCYTES # BLD AUTO: 1.6 10E3/UL (ref 0.8–5.3)
LYMPHOCYTES NFR BLD AUTO: 21 %
MCH RBC QN AUTO: 27.6 PG (ref 26.5–33)
MCHC RBC AUTO-ENTMCNC: 29.3 G/DL (ref 31.5–36.5)
MCV RBC AUTO: 94 FL (ref 78–100)
MONOCYTES # BLD AUTO: 0.5 10E3/UL (ref 0–1.3)
MONOCYTES NFR BLD AUTO: 7 %
NEUTROPHILS # BLD AUTO: 5.1 10E3/UL (ref 1.6–8.3)
NEUTROPHILS NFR BLD AUTO: 67 %
NRBC # BLD AUTO: 0 10E3/UL
NRBC BLD AUTO-RTO: 0 /100
PLATELET # BLD AUTO: 190 10E3/UL (ref 150–450)
RBC # BLD AUTO: 4.97 10E6/UL (ref 3.8–5.2)
WBC # BLD AUTO: 7.5 10E3/UL (ref 4–11)

## 2023-02-03 PROCEDURE — 94640 AIRWAY INHALATION TREATMENT: CPT

## 2023-02-03 PROCEDURE — 36415 COLL VENOUS BLD VENIPUNCTURE: CPT | Performed by: EMERGENCY MEDICINE

## 2023-02-03 PROCEDURE — 96360 HYDRATION IV INFUSION INIT: CPT

## 2023-02-03 PROCEDURE — 84484 ASSAY OF TROPONIN QUANT: CPT

## 2023-02-03 PROCEDURE — 93010 ELECTROCARDIOGRAM REPORT: CPT | Performed by: EMERGENCY MEDICINE

## 2023-02-03 PROCEDURE — 84145 PROCALCITONIN (PCT): CPT | Performed by: INTERNAL MEDICINE

## 2023-02-03 PROCEDURE — 258N000003 HC RX IP 258 OP 636: Performed by: EMERGENCY MEDICINE

## 2023-02-03 PROCEDURE — 71046 X-RAY EXAM CHEST 2 VIEWS: CPT | Mod: 26 | Performed by: RADIOLOGY

## 2023-02-03 PROCEDURE — 83880 ASSAY OF NATRIURETIC PEPTIDE: CPT | Performed by: INTERNAL MEDICINE

## 2023-02-03 PROCEDURE — 71046 X-RAY EXAM CHEST 2 VIEWS: CPT

## 2023-02-03 PROCEDURE — 83605 ASSAY OF LACTIC ACID: CPT | Performed by: EMERGENCY MEDICINE

## 2023-02-03 PROCEDURE — C9803 HOPD COVID-19 SPEC COLLECT: HCPCS

## 2023-02-03 PROCEDURE — 99285 EMERGENCY DEPT VISIT HI MDM: CPT | Mod: CS,25

## 2023-02-03 PROCEDURE — 93005 ELECTROCARDIOGRAM TRACING: CPT

## 2023-02-03 PROCEDURE — 85610 PROTHROMBIN TIME: CPT | Performed by: EMERGENCY MEDICINE

## 2023-02-03 PROCEDURE — 99285 EMERGENCY DEPT VISIT HI MDM: CPT | Mod: CS | Performed by: EMERGENCY MEDICINE

## 2023-02-03 PROCEDURE — 85025 COMPLETE CBC W/AUTO DIFF WBC: CPT | Performed by: EMERGENCY MEDICINE

## 2023-02-03 PROCEDURE — 250N000013 HC RX MED GY IP 250 OP 250 PS 637: Performed by: EMERGENCY MEDICINE

## 2023-02-03 PROCEDURE — 80053 COMPREHEN METABOLIC PANEL: CPT | Performed by: EMERGENCY MEDICINE

## 2023-02-03 RX ORDER — ALBUTEROL SULFATE 90 UG/1
2 AEROSOL, METERED RESPIRATORY (INHALATION) ONCE
Status: COMPLETED | OUTPATIENT
Start: 2023-02-03 | End: 2023-02-03

## 2023-02-03 RX ORDER — SODIUM CHLORIDE 9 MG/ML
INJECTION, SOLUTION INTRAVENOUS CONTINUOUS
Status: DISCONTINUED | OUTPATIENT
Start: 2023-02-03 | End: 2023-02-04

## 2023-02-03 RX ADMIN — SODIUM CHLORIDE: 9 INJECTION, SOLUTION INTRAVENOUS at 23:37

## 2023-02-03 RX ADMIN — ALBUTEROL SULFATE 2 PUFF: 90 AEROSOL, METERED RESPIRATORY (INHALATION) at 23:37

## 2023-02-03 NOTE — LETTER
[facility] [address] [address]  [phone]    FACSIMILE TRANSMITTAL SHEET    TO: Valeriano Garcia  COMPANY: [employer]  FAX NUMBER: [phone]  PHONE NUMBER: [phone]     FROM: DB Couch  PHONE: [phone]  DATE: 02/09/23  NUMBER OF PAGES: n/a    _____URGENT __x__REVIEW ONLY _____PLEASE COMMENT____PLEASE REPLY    NOTES/COMMENTS:     ADELFO Aparicio [birth year] - discharge orders                               IF YOU DID NOT RECEIVE THE CORRECT NUMBER OF PAGES OR THE FAX DID NOT COME THROUGH CLEARLY, PLEASE CALL THE SENDER     CONFIDENTIALITY STATEMENT: Confidential information that may accompany this transmission contains protected health information under state and federal law and is legally privileged. This information is intended only for the use of the individual or entity named above and may be used only for carrying out treatment, payment or other healthcare operations. The recipient or person responsible for delivering this information is prohibited by law from disclosing this information without proper authorization to any other party, unless required to do so by law or regulation. If you are not the intended recipient, you are hereby notified that any review, dissemination, distribution, or copying of this message is strictly prohibited. If you have received this communication in error, please destroy the materials and contact us immediately by calling the number listed above. No response indicates that the information was received by the appropriate authorized party

## 2023-02-04 ENCOUNTER — APPOINTMENT (OUTPATIENT)
Dept: CT IMAGING | Facility: CLINIC | Age: 68
DRG: 190 | End: 2023-02-04
Attending: EMERGENCY MEDICINE
Payer: COMMERCIAL

## 2023-02-04 LAB
ALBUMIN SERPL BCG-MCNC: 4.1 G/DL (ref 3.5–5.2)
ALP SERPL-CCNC: 99 U/L (ref 35–104)
ALT SERPL W P-5'-P-CCNC: 12 U/L (ref 10–35)
ANION GAP SERPL CALCULATED.3IONS-SCNC: 7 MMOL/L (ref 7–15)
AST SERPL W P-5'-P-CCNC: 15 U/L (ref 10–35)
BILIRUB SERPL-MCNC: 0.2 MG/DL
BUN SERPL-MCNC: 7.2 MG/DL (ref 8–23)
C PNEUM DNA SPEC QL NAA+PROBE: NOT DETECTED
CALCIUM SERPL-MCNC: 9.4 MG/DL (ref 8.8–10.2)
CHLORIDE SERPL-SCNC: 100 MMOL/L (ref 98–107)
CREAT SERPL-MCNC: 0.44 MG/DL (ref 0.51–0.95)
CREAT SERPL-MCNC: 0.44 MG/DL (ref 0.51–0.95)
DEPRECATED HCO3 PLAS-SCNC: 34 MMOL/L (ref 22–29)
FLUAV H1 2009 PAND RNA SPEC QL NAA+PROBE: NOT DETECTED
FLUAV H1 RNA SPEC QL NAA+PROBE: NOT DETECTED
FLUAV H3 RNA SPEC QL NAA+PROBE: NOT DETECTED
FLUAV RNA SPEC QL NAA+PROBE: NEGATIVE
FLUAV RNA SPEC QL NAA+PROBE: NOT DETECTED
FLUBV RNA RESP QL NAA+PROBE: NEGATIVE
FLUBV RNA SPEC QL NAA+PROBE: NOT DETECTED
GFR SERPL CREATININE-BSD FRML MDRD: >90 ML/MIN/1.73M2
GFR SERPL CREATININE-BSD FRML MDRD: >90 ML/MIN/1.73M2
GLUCOSE BLDC GLUCOMTR-MCNC: 236 MG/DL (ref 70–99)
GLUCOSE BLDC GLUCOMTR-MCNC: 247 MG/DL (ref 70–99)
GLUCOSE BLDC GLUCOMTR-MCNC: 249 MG/DL (ref 70–99)
GLUCOSE BLDC GLUCOMTR-MCNC: 254 MG/DL (ref 70–99)
GLUCOSE SERPL-MCNC: 235 MG/DL (ref 70–99)
HADV DNA SPEC QL NAA+PROBE: NOT DETECTED
HCOV PNL SPEC NAA+PROBE: NOT DETECTED
HMPV RNA SPEC QL NAA+PROBE: NOT DETECTED
HPIV1 RNA SPEC QL NAA+PROBE: NOT DETECTED
HPIV2 RNA SPEC QL NAA+PROBE: NOT DETECTED
HPIV3 RNA SPEC QL NAA+PROBE: NOT DETECTED
HPIV4 RNA SPEC QL NAA+PROBE: NOT DETECTED
INR PPP: 1.02 (ref 0.85–1.15)
M PNEUMO DNA SPEC QL NAA+PROBE: NOT DETECTED
NT-PROBNP SERPL-MCNC: 60 PG/ML (ref 0–900)
POTASSIUM SERPL-SCNC: 4 MMOL/L (ref 3.4–5.3)
PROCALCITONIN SERPL IA-MCNC: 0.02 NG/ML
PROT SERPL-MCNC: 6.8 G/DL (ref 6.4–8.3)
RSV RNA SPEC NAA+PROBE: NEGATIVE
RSV RNA SPEC QL NAA+PROBE: NOT DETECTED
RSV RNA SPEC QL NAA+PROBE: NOT DETECTED
RV+EV RNA SPEC QL NAA+PROBE: NOT DETECTED
SARS-COV-2 RNA RESP QL NAA+PROBE: NEGATIVE
SODIUM SERPL-SCNC: 141 MMOL/L (ref 136–145)
TROPONIN T SERPL HS-MCNC: <6 NG/L

## 2023-02-04 PROCEDURE — 250N000009 HC RX 250: Performed by: EMERGENCY MEDICINE

## 2023-02-04 PROCEDURE — 71275 CT ANGIOGRAPHY CHEST: CPT | Mod: 26 | Performed by: RADIOLOGY

## 2023-02-04 PROCEDURE — 99223 1ST HOSP IP/OBS HIGH 75: CPT | Mod: GC | Performed by: INTERNAL MEDICINE

## 2023-02-04 PROCEDURE — 250N000012 HC RX MED GY IP 250 OP 636 PS 637

## 2023-02-04 PROCEDURE — 87633 RESP VIRUS 12-25 TARGETS: CPT | Performed by: INTERNAL MEDICINE

## 2023-02-04 PROCEDURE — 87486 CHLMYD PNEUM DNA AMP PROBE: CPT | Performed by: INTERNAL MEDICINE

## 2023-02-04 PROCEDURE — 120N000002 HC R&B MED SURG/OB UMMC

## 2023-02-04 PROCEDURE — 94640 AIRWAY INHALATION TREATMENT: CPT | Mod: 76

## 2023-02-04 PROCEDURE — 87637 SARSCOV2&INF A&B&RSV AMP PRB: CPT | Performed by: EMERGENCY MEDICINE

## 2023-02-04 PROCEDURE — 82962 GLUCOSE BLOOD TEST: CPT

## 2023-02-04 PROCEDURE — 250N000013 HC RX MED GY IP 250 OP 250 PS 637

## 2023-02-04 PROCEDURE — 71275 CT ANGIOGRAPHY CHEST: CPT

## 2023-02-04 PROCEDURE — 94640 AIRWAY INHALATION TREATMENT: CPT

## 2023-02-04 PROCEDURE — 96361 HYDRATE IV INFUSION ADD-ON: CPT

## 2023-02-04 PROCEDURE — 250N000011 HC RX IP 250 OP 636

## 2023-02-04 PROCEDURE — 999N000157 HC STATISTIC RCP TIME EA 10 MIN

## 2023-02-04 PROCEDURE — 250N000009 HC RX 250

## 2023-02-04 PROCEDURE — 250N000011 HC RX IP 250 OP 636: Performed by: EMERGENCY MEDICINE

## 2023-02-04 PROCEDURE — 99207 PR APP CREDIT; MD BILLING SHARED VISIT: CPT | Performed by: INTERNAL MEDICINE

## 2023-02-04 PROCEDURE — 250N000013 HC RX MED GY IP 250 OP 250 PS 637: Performed by: INTERNAL MEDICINE

## 2023-02-04 PROCEDURE — 99223 1ST HOSP IP/OBS HIGH 75: CPT | Mod: AI

## 2023-02-04 PROCEDURE — 99418 PROLNG IP/OBS E/M EA 15 MIN: CPT

## 2023-02-04 RX ORDER — AMLODIPINE BESYLATE 10 MG/1
10 TABLET ORAL DAILY
Status: DISCONTINUED | OUTPATIENT
Start: 2023-02-04 | End: 2023-02-09 | Stop reason: HOSPADM

## 2023-02-04 RX ORDER — METHYLPREDNISOLONE SODIUM SUCCINATE 125 MG/2ML
125 INJECTION, POWDER, LYOPHILIZED, FOR SOLUTION INTRAMUSCULAR; INTRAVENOUS ONCE
Status: COMPLETED | OUTPATIENT
Start: 2023-02-04 | End: 2023-02-04

## 2023-02-04 RX ORDER — ENOXAPARIN SODIUM 100 MG/ML
40 INJECTION SUBCUTANEOUS EVERY 24 HOURS
Status: DISCONTINUED | OUTPATIENT
Start: 2023-02-04 | End: 2023-02-09 | Stop reason: HOSPADM

## 2023-02-04 RX ORDER — METHYLPREDNISOLONE SODIUM SUCCINATE 125 MG/2ML
60 INJECTION, POWDER, LYOPHILIZED, FOR SOLUTION INTRAMUSCULAR; INTRAVENOUS EVERY 24 HOURS
Status: DISCONTINUED | OUTPATIENT
Start: 2023-02-05 | End: 2023-02-05

## 2023-02-04 RX ORDER — ACETAMINOPHEN 325 MG/1
650 TABLET ORAL EVERY 6 HOURS PRN
Status: DISCONTINUED | OUTPATIENT
Start: 2023-02-04 | End: 2023-02-09 | Stop reason: HOSPADM

## 2023-02-04 RX ORDER — IOPAMIDOL 755 MG/ML
69 INJECTION, SOLUTION INTRAVASCULAR ONCE
Status: COMPLETED | OUTPATIENT
Start: 2023-02-04 | End: 2023-02-04

## 2023-02-04 RX ORDER — ASPIRIN 81 MG/1
81 TABLET ORAL DAILY
Status: DISCONTINUED | OUTPATIENT
Start: 2023-02-04 | End: 2023-02-09 | Stop reason: HOSPADM

## 2023-02-04 RX ORDER — LEVOFLOXACIN 5 MG/ML
500 INJECTION, SOLUTION INTRAVENOUS EVERY 24 HOURS
Status: DISCONTINUED | OUTPATIENT
Start: 2023-02-04 | End: 2023-02-08

## 2023-02-04 RX ORDER — FLUTICASONE FUROATE AND VILANTEROL 200; 25 UG/1; UG/1
1 POWDER RESPIRATORY (INHALATION) DAILY
Status: DISCONTINUED | OUTPATIENT
Start: 2023-02-04 | End: 2023-02-09 | Stop reason: HOSPADM

## 2023-02-04 RX ORDER — IPRATROPIUM BROMIDE AND ALBUTEROL SULFATE 2.5; .5 MG/3ML; MG/3ML
1 SOLUTION RESPIRATORY (INHALATION) EVERY 6 HOURS
Status: DISCONTINUED | OUTPATIENT
Start: 2023-02-04 | End: 2023-02-06

## 2023-02-04 RX ORDER — NICOTINE 21 MG/24HR
1 PATCH, TRANSDERMAL 24 HOURS TRANSDERMAL DAILY
Status: DISCONTINUED | OUTPATIENT
Start: 2023-02-04 | End: 2023-02-09 | Stop reason: HOSPADM

## 2023-02-04 RX ORDER — AMOXICILLIN 250 MG
2 CAPSULE ORAL 2 TIMES DAILY PRN
Status: DISCONTINUED | OUTPATIENT
Start: 2023-02-04 | End: 2023-02-09 | Stop reason: HOSPADM

## 2023-02-04 RX ORDER — NICOTINE POLACRILEX 4 MG
15-30 LOZENGE BUCCAL
Status: DISCONTINUED | OUTPATIENT
Start: 2023-02-04 | End: 2023-02-09 | Stop reason: HOSPADM

## 2023-02-04 RX ORDER — MULTIPLE VITAMINS W/ MINERALS TAB 9MG-400MCG
1 TAB ORAL DAILY
Status: DISCONTINUED | OUTPATIENT
Start: 2023-02-04 | End: 2023-02-09 | Stop reason: HOSPADM

## 2023-02-04 RX ORDER — BENZONATATE 100 MG/1
200 CAPSULE ORAL 3 TIMES DAILY PRN
Status: DISCONTINUED | OUTPATIENT
Start: 2023-02-04 | End: 2023-02-09 | Stop reason: HOSPADM

## 2023-02-04 RX ORDER — LIDOCAINE 40 MG/G
CREAM TOPICAL
Status: DISCONTINUED | OUTPATIENT
Start: 2023-02-04 | End: 2023-02-09 | Stop reason: HOSPADM

## 2023-02-04 RX ORDER — AMOXICILLIN 250 MG
1 CAPSULE ORAL 2 TIMES DAILY PRN
Status: DISCONTINUED | OUTPATIENT
Start: 2023-02-04 | End: 2023-02-09 | Stop reason: HOSPADM

## 2023-02-04 RX ORDER — DEXTROSE MONOHYDRATE 25 G/50ML
25-50 INJECTION, SOLUTION INTRAVENOUS
Status: DISCONTINUED | OUTPATIENT
Start: 2023-02-04 | End: 2023-02-09 | Stop reason: HOSPADM

## 2023-02-04 RX ORDER — ACETAMINOPHEN 650 MG/1
650 SUPPOSITORY RECTAL EVERY 6 HOURS PRN
Status: DISCONTINUED | OUTPATIENT
Start: 2023-02-04 | End: 2023-02-09 | Stop reason: HOSPADM

## 2023-02-04 RX ORDER — GLIPIZIDE 5 MG/1
5 TABLET ORAL
Status: DISCONTINUED | OUTPATIENT
Start: 2023-02-04 | End: 2023-02-09 | Stop reason: HOSPADM

## 2023-02-04 RX ORDER — CETIRIZINE HYDROCHLORIDE 10 MG/1
10 TABLET ORAL DAILY
Status: DISCONTINUED | OUTPATIENT
Start: 2023-02-04 | End: 2023-02-09 | Stop reason: HOSPADM

## 2023-02-04 RX ORDER — FLUTICASONE PROPIONATE 50 MCG
1 SPRAY, SUSPENSION (ML) NASAL DAILY
Status: DISCONTINUED | OUTPATIENT
Start: 2023-02-04 | End: 2023-02-09 | Stop reason: HOSPADM

## 2023-02-04 RX ORDER — IPRATROPIUM BROMIDE AND ALBUTEROL SULFATE 2.5; .5 MG/3ML; MG/3ML
3 SOLUTION RESPIRATORY (INHALATION) ONCE
Status: COMPLETED | OUTPATIENT
Start: 2023-02-04 | End: 2023-02-04

## 2023-02-04 RX ORDER — ONDANSETRON 2 MG/ML
4 INJECTION INTRAMUSCULAR; INTRAVENOUS EVERY 6 HOURS PRN
Status: DISCONTINUED | OUTPATIENT
Start: 2023-02-04 | End: 2023-02-09 | Stop reason: HOSPADM

## 2023-02-04 RX ORDER — ONDANSETRON 4 MG/1
4 TABLET, ORALLY DISINTEGRATING ORAL EVERY 6 HOURS PRN
Status: DISCONTINUED | OUTPATIENT
Start: 2023-02-04 | End: 2023-02-09 | Stop reason: HOSPADM

## 2023-02-04 RX ORDER — PANTOPRAZOLE SODIUM 40 MG/1
40 TABLET, DELAYED RELEASE ORAL 2 TIMES DAILY
Status: DISCONTINUED | OUTPATIENT
Start: 2023-02-04 | End: 2023-02-09 | Stop reason: HOSPADM

## 2023-02-04 RX ORDER — DOXYCYCLINE 100 MG/1
100 CAPSULE ORAL EVERY 12 HOURS SCHEDULED
Status: DISCONTINUED | OUTPATIENT
Start: 2023-02-04 | End: 2023-02-04

## 2023-02-04 RX ORDER — GUAIFENESIN 600 MG/1
600 TABLET, EXTENDED RELEASE ORAL 2 TIMES DAILY
Status: DISCONTINUED | OUTPATIENT
Start: 2023-02-04 | End: 2023-02-09 | Stop reason: HOSPADM

## 2023-02-04 RX ORDER — ALBUTEROL SULFATE 90 UG/1
1-2 AEROSOL, METERED RESPIRATORY (INHALATION) EVERY 4 HOURS PRN
Status: DISCONTINUED | OUTPATIENT
Start: 2023-02-04 | End: 2023-02-09 | Stop reason: HOSPADM

## 2023-02-04 RX ADMIN — ENOXAPARIN SODIUM 40 MG: 40 INJECTION SUBCUTANEOUS at 08:03

## 2023-02-04 RX ADMIN — PANTOPRAZOLE SODIUM 40 MG: 40 TABLET, DELAYED RELEASE ORAL at 20:15

## 2023-02-04 RX ADMIN — IPRATROPIUM BROMIDE AND ALBUTEROL SULFATE 3 ML: 2.5; .5 SOLUTION RESPIRATORY (INHALATION) at 20:49

## 2023-02-04 RX ADMIN — INSULIN ASPART 3 UNITS: 100 INJECTION, SOLUTION INTRAVENOUS; SUBCUTANEOUS at 13:22

## 2023-02-04 RX ADMIN — GUAIFENESIN 600 MG: 600 TABLET ORAL at 20:13

## 2023-02-04 RX ADMIN — ASPIRIN 81 MG: 81 TABLET ORAL at 08:03

## 2023-02-04 RX ADMIN — IPRATROPIUM BROMIDE AND ALBUTEROL SULFATE 3 ML: .5; 2.5 SOLUTION RESPIRATORY (INHALATION) at 05:47

## 2023-02-04 RX ADMIN — AMLODIPINE BESYLATE 10 MG: 10 TABLET ORAL at 08:03

## 2023-02-04 RX ADMIN — PREGABALIN 150 MG: 100 CAPSULE ORAL at 20:21

## 2023-02-04 RX ADMIN — FLUTICASONE PROPIONATE 1 SPRAY: 50 SPRAY, METERED NASAL at 09:32

## 2023-02-04 RX ADMIN — CETIRIZINE HYDROCHLORIDE 10 MG: 10 TABLET, FILM COATED ORAL at 09:33

## 2023-02-04 RX ADMIN — IPRATROPIUM BROMIDE AND ALBUTEROL SULFATE 3 ML: 2.5; .5 SOLUTION RESPIRATORY (INHALATION) at 08:36

## 2023-02-04 RX ADMIN — IOPAMIDOL 69 ML: 755 INJECTION, SOLUTION INTRAVENOUS at 04:29

## 2023-02-04 RX ADMIN — INSULIN ASPART 2 UNITS: 100 INJECTION, SOLUTION INTRAVENOUS; SUBCUTANEOUS at 11:11

## 2023-02-04 RX ADMIN — EMPAGLIFLOZIN 25 MG: 25 TABLET, FILM COATED ORAL at 09:33

## 2023-02-04 RX ADMIN — GLIPIZIDE 5 MG: 5 TABLET ORAL at 09:33

## 2023-02-04 RX ADMIN — GLIPIZIDE 5 MG: 5 TABLET ORAL at 15:32

## 2023-02-04 RX ADMIN — LEVOFLOXACIN 500 MG: 5 INJECTION, SOLUTION INTRAVENOUS at 08:02

## 2023-02-04 RX ADMIN — METHYLPREDNISOLONE SODIUM SUCCINATE 125 MG: 125 INJECTION, POWDER, FOR SOLUTION INTRAMUSCULAR; INTRAVENOUS at 05:17

## 2023-02-04 RX ADMIN — INSULIN ASPART 3 UNITS: 100 INJECTION, SOLUTION INTRAVENOUS; SUBCUTANEOUS at 17:07

## 2023-02-04 RX ADMIN — UMECLIDINIUM 1 PUFF: 62.5 AEROSOL, POWDER ORAL at 09:32

## 2023-02-04 RX ADMIN — PANTOPRAZOLE SODIUM 40 MG: 40 TABLET, DELAYED RELEASE ORAL at 08:03

## 2023-02-04 RX ADMIN — PREGABALIN 150 MG: 100 CAPSULE ORAL at 08:03

## 2023-02-04 RX ADMIN — FLUTICASONE FUROATE AND VILANTEROL TRIFENATATE 1 PUFF: 200; 25 POWDER RESPIRATORY (INHALATION) at 09:32

## 2023-02-04 RX ADMIN — IPRATROPIUM BROMIDE AND ALBUTEROL SULFATE 3 ML: 2.5; .5 SOLUTION RESPIRATORY (INHALATION) at 13:03

## 2023-02-04 RX ADMIN — Medication 1 TABLET: at 08:03

## 2023-02-04 ASSESSMENT — ACTIVITIES OF DAILY LIVING (ADL)
ADLS_ACUITY_SCORE: 28
CHANGE_IN_FUNCTIONAL_STATUS_SINCE_ONSET_OF_CURRENT_ILLNESS/INJURY: NO
ADLS_ACUITY_SCORE: 35
VISION_MANAGEMENT: GLASSES
DRESS: 1-->ASSISTANCE (EQUIPMENT/PERSON) NEEDED (NOT DEVELOPMENTALLY APPROPRIATE)
DRESSING/BATHING: BATHING DIFFICULTY, REQUIRES EQUIPMENT
WEAR_GLASSES_OR_BLIND: YES
BATHING: 1-->ASSISTANCE NEEDED
ADLS_ACUITY_SCORE: 35
ADLS_ACUITY_SCORE: 32
DRESSING/BATHING_DIFFICULTY: YES
ADLS_ACUITY_SCORE: 35
ADLS_ACUITY_SCORE: 35
TRANSFERRING: 0-->INDEPENDENT
CONCENTRATING,_REMEMBERING_OR_MAKING_DECISIONS_DIFFICULTY: NO
CONCENTRATING,_REMEMBERING_OR_MAKING_DECISIONS_DIFFICULTY: NO
ADLS_ACUITY_SCORE: 35
DOING_ERRANDS_INDEPENDENTLY_DIFFICULTY: YES
DIFFICULTY_EATING/SWALLOWING: NO
WALKING_OR_CLIMBING_STAIRS: AMBULATION DIFFICULTY, REQUIRES EQUIPMENT
TRANSFERRING: 0-->INDEPENDENT
TOILETING_ISSUES: NO
ADLS_ACUITY_SCORE: 32
DIFFICULTY_COMMUNICATING: NO
ADLS_ACUITY_SCORE: 35
ADLS_ACUITY_SCORE: 35
DIFFICULTY_EATING/SWALLOWING: NO
NUMBER_OF_TIMES_PATIENT_HAS_FALLEN_WITHIN_LAST_SIX_MONTHS: 1
VISION_MANAGEMENT: GLASSES
DRESS: 1-->ASSISTANCE (EQUIPMENT/PERSON) NEEDED
ADLS_ACUITY_SCORE: 35
WEAR_GLASSES_OR_BLIND: YES
ADLS_ACUITY_SCORE: 35
FALL_HISTORY_WITHIN_LAST_SIX_MONTHS: YES
WALKING_OR_CLIMBING_STAIRS_DIFFICULTY: YES

## 2023-02-04 NOTE — CONSULTS
Oncology  Consult Note   Date of Service: 02/04/2023    Patient: Jenn Aparicio  MRN: [id number]  Admission Date: 2/3/2023  Hospital Day # 0  Cancer Diagnosis: NSCLC adenocarcinoma Stage IA2  Primary Outpatient Oncologist:    Current Treatment Plan: Survelliance    Reason for Consult: Patient with non-small cell lung cancer on surveillance now admitted with COPD exacerbation.      History of Present Illness:    Jenn Aparicio is a 67 year old year old female with PMH of  Type 2 DM not on insulin c/b peripheral neuropathy, RLL adenocarcinoma s/p lobectomy in 2016, RUL NSCLC s/p SBRT (1/2020), HTN  COPD with chronic hypoxic respiratory failure requiring 3L at baseline who presents with COPD exacerbation.     She comes in because of worsening shortness of breath , coarse cough and desaturations with any exertion.  CT scan shows no evidence for pulmonary embolism or infiltrate a upper lobe lesion suspicious for malignancy.  She is currently on her baseline 3 L of oxygen      Oncologic History:    12/24/15            PET/CT Diagnosis   1/13/15              CT lung bx. Adenocarcinoma  2/8/16                R thoracotomy, RLL lobectomy (Dr. Cunha). Adenocarcinoma, 1.1 cm, assoicated with atypical adenomatous hyperplasia  10/18/19            CTA for shortness of breath. New 1.3 cm RUL nodule  11/2019              PET/CT. 1.1 cm RUL hypermetabolic nodule (SUV 12.6)  12/26/19            Brain MRI negative for mets  1/14~1/28/20     SBRT to RUL nodule, 5000 cGy, every other day, 1000 cGy (Dr. Currie at [facility] No bx due to O2 dependence and too much risk    11/29/21            CT chest. New 10 mm RUL nodule  1/11/22              CT chest. New 7.2 mm RUL nodule, unchanged RUL nodules  3/31/22              CT chest. New RUL nodule from Jan 2022 7-->10 mm, new 5 mm ROBERT nodule  5/20~5/24/22     [facility] for COPD exacerbation.   6/24/22              CT chest non contrast.   8/16/22              EGD. 3 cm hiatal hernia, o/w  normal  8/31/22              CT chest. 2.5 x 1.3 cm R midlung subpleural nodularity (4:98) previously 2.3 x 1.1 cm, slightly increased solid component   10/5/22              PET/CT. 2 x 1.3 cm irregular opacity posterior RUL (SUV 2.46), 2.4 x 0.8 cm linear opacity (SUV 4.76); there was bronchiectasia on prior imaging. Stable 1.1 x 0.8 cm non FDG avid RUL opacity and unchanged 4 mm posterior RUL nodule. No adenopathy. Inferior right breast uptake (SUV 6.09) likely infectious or inflammatory.     Review of Systems:  A comprehensive ROS was performed and found to be negative or non-contributory with the exception of that noted in the HPI above.    Past Medical History:  Past Medical History:   Diagnosis Date     Adenocarcinoma, lung (H)      Asthma      Ectopic pregnancy      Esophageal reflux      Pulmonary emphysema (H)     Very severe FEV1<30% predicted     Type II diabetes mellitus (H)        Past Surgical History:  Past Surgical History:   Procedure Laterality Date     2/8/16                R thoracotomy, RLL lobectomy (Dr. Cunha). Adenocarcinoma, 1.1 cm, assoicated with atypical adenomatous hyperplasia Right 02/08/2016    R thoracotomy, RLL lobectomy (Dr. Cunha). Adenocarcinoma, 1.1 cm, assoicated with atypical adenomatous hyperplasia     ESOPHAGOSCOPY, GASTROSCOPY, DUODENOSCOPY (EGD), COMBINED N/A 8/16/2022    Procedure: ESOPHAGOGASTRODUODENOSCOPY (EGD);  Surgeon: Travis Briseno MD;  Location:  GI     ORTHOPEDIC SURGERY         Social History:  Social History     Socioeconomic History     Marital status: Single   Tobacco Use     Smoking status: Every Day     Packs/day: 0.25     Types: Cigarettes     Smokeless tobacco: Former     Tobacco comments:     01/02/2023 Patient using 14 mg patch, wants to have prescription for Nicotrol inhaler, took workbook   Substance and Sexual Activity     Alcohol use: Yes     Comment: sometime     Drug use: No     Sexual activity: Not Currently     Partners: Male   Social  History Narrative    Ms. Aparicio lives in an apartment complex by herself as of May 2022. She has frequent smoke exposure from neighbors even when she is not smoking herself.         Family History  Family History   Problem Relation Age of Onset     Lung Cancer Sister      Depression Daughter      Alcohol/Drug Other         self     Diabetes Other         self     Thyroid Disease Other         self     Asthma Other         self       Outpatient Medications:  Reviewed     Physical Exam:    Blood pressure 132/79, pulse 99, temperature 98  F (36.7  C), temperature source Oral, resp. rate 27, SpO2 98 %, not currently breastfeeding.  General: alert and cooperative, lying in bed, no acute distress  HEENT: sclera anicteric, EOMI, MMM  Neck: supple, normal ROM  CV: RRR, no murmurs  Resp: Bilateral wheezing present   GI: soft, non-tender, non-distended, bowel sounds present and normoactive  MSK: warm and well-perfused, normal tone  Skin: no rashes on limited exam, no jaundice  Neuro: Alert and interactive, moves all extremities equally, no focal deficits    Labs & Studies: I personally reviewed the following studies:  ROUTINE LABS (Last four results):  CMP  Recent Labs   Lab 02/03/23  2323      POTASSIUM 4.0   CHLORIDE 100   CO2 34*   ANIONGAP 7   *   BUN 7.2*   CR 0.44*  0.44*   GFRESTIMATED >90  >90   LUIGI 9.4   PROTTOTAL 6.8   ALBUMIN 4.1   BILITOTAL 0.2   ALKPHOS 99   AST 15   ALT 12     CBC  Recent Labs   Lab 02/03/23  2323   WBC 7.5   RBC 4.97   HGB 13.7   HCT 46.8   MCV 94   MCH 27.6   MCHC 29.3*   RDW 13.4        INR  Recent Labs   Lab 02/03/23  2323   INR 1.02       Assessment & Plan:   Jenn Aparicio is a 67 year old female with history of stage Ia poorly differentiated adenocarcinoma of the right lower lobe status post lobectomy, stage Ia right upper lobe adenocarcinoma status post SBRT who has been on surveillance is admitted with COPD exacerbation.    1. Stage Ia RLL adenocarcinoma lung s/p  lobectomy  2. Stage 1a RUL adenocarinoma lung s/p SBRT   Patient was initially diagnosed with adenocarcinoma in January 2015, she had stage Ia adenocarcinoma RLL s/p lobectomy in 2016 and stage Ia adenocarcinoma RUL, which was inoperable so completed SBRT in January 2020.  Since then she has been on surveillance PET CT scan and her cancer has been stable.  Reviewed the CT scan from 2/3/2023 and the cancer has been stable.    Next surveillance CT scan is in 4 weeks on 3/1/2023 and follow-up with Dr. Leung on 3/1/2023.    3. COPD exacerbation   Patient has history of chronic hypoxic respiratory failure from COPD requiring 3 L at baseline, she was admitted with worsening shortness of breath.  Imaging was negative for any infectious etiology or worsening of malignancy.  She is currently on her baseline 3 L of oxygen.   Recommend symptomatic management for COPD.    Recommendations:   -Appreciate management of COPD by primary team  -No inpatient needs for cancer directed imaging or treatment  -Follow-up outpatient with Dr. Leung on 3/1/2023 (as scheduled) after surveillance CT chest which is also scheduled on 3/1/2023.     We will sign off at this time. Please do not hesitate to page with any questions or concerns.    Patient was seen and plan of care was discussed with attending physician .    Shabbir Durham MD  Hematology/Oncology/Transplant Fellow   Pgr :: [phone]

## 2023-02-04 NOTE — H&P
[facility]    History and Physical - Hospitalist Service, GOLD TEAM        Date of Admission:  2/3/2023    Assessment & Plan      Jenn Aparicio is a 67 year old female admitted on 2/3/2023. She has PMH of  Type 2 DM not on insulin c/b peripheral neuropathy, RLL adenocarcinoma s/p lobectomy in 2016, RUL NSCLC s/p SBRT (1/2020), HTN  COPD with chronic hypoxic respiratory failure requiring 3L at baseline who presents with COPD exacerbation and admitted for In Patient management    #COPD exacerbation  # Acute on chronic hypoxic respiratory failure    Patient reports continued easy desaturation with exertion, increased cough and chest pain over the last several days. She feels this exacerbation is a little worse than before due to associated chest pain, and it takes her longer to recover after desaturation.  The chest pain is described as central, vague, and worse during inspiration.  She had some improvement after discharge last month and finished the antibiotic and steroid but symptoms gradually came back. At baseline she uses 3L nasal canula and she often turns it up for exertion. She has an SpO2 monitor at home. Otherwise denies fever chills, rigors; reports no abdominal pain or diarrhea; reports no urinary symptoms or blood in urine; no flank pain;     Has been relatively stable after admission, currently on 3 L oxygen, vitals without hypotension or tachycardia; WBC not elevated ; lactic acid 0.9, electrolytes stable; negative for respiratory panel; CT chest negative for PE, no significant acute changes; stable upper lobe nodule    Admitted for high risk recurrent exacerbation or initial IV antibiotics and a steroid and close monitoring. Started on IV Levofloxacin given good response during last admission, can transition to PO as appropriate     IV solumedrol for now, can transition to po as appropriate     Scheduled Duoneb every 6 hours and daily Trelegy and Flonase with  PRN albuterol     Continue oxygen supplement as needed to keep Sat > 92%    Incentive respiration and physiotherapy     Tesslon pearls for cough    Consider hypertonic saline nebs as needed for secretion burden    Respiratory pathogen panel and sputum culture     Follows with oncology and will have repeat CT chest in 2 weeks to monitor progression for nodules; last saw pulmonary outpatient in 8/2022 and they recommended possible addition of roflumilast vs azithromycin to minimize further exacerbations     # Lung adenocarcinoma, poorly diff adeno RLL  # suspected NSCLC RUL  # S/P R thoracotomy, RLL lobectomy (2016) and radiation (2020)     Follows with Oncology at [facility]. Per chart the post radiation changes look fairly stable. Current CT scan without acute changes. Has follow up screening in coming 2 weeks.     Oncology consult put to help with assessment and planning with lung nodule and Hx of RLL adenocarcinoma given poor outpatient follow up. Greatly appreciate recommendation      # Premature atria beats    Incidental finding on EKG and is not visible on prior EKGs. Patient does have anterior chest pain but the nature of pain seems pleuritic and existing over the last several days. Also has underlying GERD. Electrolytes stable     Ordered Troponin, monitor and consider repeat EKG/trop, Echo if needed. Last Echo on 9/22/22 with EF of 50-55% and no significant finding      #HTN  - Continue PTA amlodipine      #Type 2 Diabetes  # Peripheral neuropathy    Last A1c 9.4 on 1/23/23, baseline 8.3-9.4 over the last 1 year; so seems poorly controlled. Continue PTA empagliflozin and with sliding scale as backup while in patient, may need proper arrangement for follow up as outpatient     Monitor for hypoglycemia, pharmacy warning for increased risk on quinolones     Continue PTA Lyrica for neuropathy        # GERD   #Dysphagia  -Continue PTA omeprazole    # Allergy    Uses cetrizine at home, continue if  needed      Diet:   Regular  DVT Prophylaxis: Enoxaparin (Lovenox) SQ  Andrade Catheter: Not present  Lines: None     Cardiac Monitoring: None  Code Status:   DNR/DNI per patient during Hx taking     Clinically Significant Risk Factors Present on Admission                      # DMII: A1C = 9.4 % (Ref range: <5.7 %) within past 3 months            Disposition Plan      Expected Discharge Date: 02/05/2023                The patient's care was discussed with the Attending Physician, Dr. Ponce and the patient .    Sonja Duke PA-C  Hospitalist Service, [facility]  Securely message with [email or web address] (more info)  Text page via [facility] Paging/Directory   See signed in provider for up to date coverage information    ______________________________________________________________________    Chief Complaint   Worsening dyspnea, cough and chest pain    History is obtained from the patient and chart review     History of Present Illness   Jenn Aparicio is a 67 year old female with PMH of  Type 2 DM not on insulin c/b peripheral neuropathy, RLL adenocarcinoma s/p lobectomy in 2016, RUL NSCLC s/p SBRT (1/2020), HTN  COPD with chronic hypoxic respiratory failure requiring 3L at baseline who presents with symptoms concerning for COPD exacerbation. Patient reports continued easy desaturation with exertion, increased cough and chest pain over the last several days. She feels this exacerbation is a little worse than before due to associated chest pain, and it takes her longer to recover after desaturation.  The chest pain is described as central, vague, and worse during inspiration.  She had some improvement after discharge last month and finished the antibiotic and steroid but symptoms gradually came back. At baseline she uses 3L nasal canula and she often turns it up for exertion.  Otherwise denies fever chills, rigors; reports no abdominal pain or diarrhea; reports no urinary  symptoms or blood in urine; no flank pain      Past Medical History    Past Medical History:   Diagnosis Date     Adenocarcinoma, lung (H)      Asthma      Ectopic pregnancy      Esophageal reflux      Pulmonary emphysema (H)     Very severe FEV1<30% predicted     Type II diabetes mellitus (H)        Past Surgical History   Past Surgical History:   Procedure Laterality Date     16                R thoracotomy, RLL lobectomy (Dr. Cunha). Adenocarcinoma, 1.1 cm, assoicated with atypical adenomatous hyperplasia Right 2016    R thoracotomy, RLL lobectomy (Dr. Cunha). Adenocarcinoma, 1.1 cm, assoicated with atypical adenomatous hyperplasia     ESOPHAGOSCOPY, GASTROSCOPY, DUODENOSCOPY (EGD), COMBINED N/A 2022    Procedure: ESOPHAGOGASTRODUODENOSCOPY (EGD);  Surgeon: Travis Briseno MD;  Location:  GI     ORTHOPEDIC SURGERY         Prior to Admission Medications   Prior to Admission Medications   Prescriptions Last Dose Informant Patient Reported? Taking?   Alpha-Lipoic Acid 300 MG CAPS   No No   Sig: Take 300 mg by mouth daily   Patient not taking: Reported on 2023   Continuous Blood Gluc  (FREESTYLE GIOVANNI 2 READER) JOSEPHINE   No No   Si each daily For continuous glucose monitoring   Continuous Blood Gluc Sensor (FREESTYLE GIOVANNI 14 DAY SENSOR) MISC   No No   Sig: Change every 14 days.   Patient not taking: Reported on 2023   Continuous Blood Gluc Sensor (FREESTYLE GIOVANNI 2 SENSOR) MISC   No No   Si each every 14 days For use with Freestyle Giovanni 2  for continuous monitioring of blood glucose levels. Replace sensor every 14 days.   Easy Comfort Lancets MISC   Yes No   Fluticasone-Umeclidin-Vilanterol (TRELEGY ELLIPTA) 200-62.5-25 MCG/INH oral inhaler   No No   Sig: Inhale 1 puff into the lungs daily   Lancet Devices (EASY MINI EJECT LANCING DEVICE) MISC   Yes No   STATIN NOT PRESCRIBED (INTENTIONAL)   No No   Sig: Please choose reason not prescribed from choices below.    albuterol (PROAIR HFA/PROVENTIL HFA/VENTOLIN HFA) 108 (90 Base) MCG/ACT inhaler   No No   Sig: INHALE 1 OR 2 PUFFS BY MOUTH EVERY 4 HOURS AS NEEDED   alcohol swab prep pads   No No   Sig: Use to swab area of injection/xander as directed.   amLODIPine (NORVASC) 10 MG tablet   No No   Sig: Take 1 tablet (10 mg) by mouth daily   aspirin 81 MG EC tablet   No No   Sig: Take 1 tablet (81 mg) by mouth daily   atorvastatin (LIPITOR) 20 MG tablet   Yes No   Si tablet daily   Patient not taking: Reported on 2023   benzonatate (TESSALON) 200 MG capsule   No No   Sig: Take 1 capsule (200 mg) by mouth 3 times daily as needed for cough   cetirizine (ZYRTEC) 10 MG tablet   No No   Sig: Take 1 tablet (10 mg) by mouth daily   coenzyme Q-10 (CO-Q10) 50 MG capsule   No No   Sig: Take 1 capsule (50 mg) by mouth daily   Patient not taking: Reported on 2023   cyclobenzaprine (FLEXERIL) 5 MG tablet   No No   Sig: Take 1 tablet (5 mg) by mouth 3 times daily as needed for muscle spasms   Patient not taking: Reported on 2023   doxycycline hyclate (VIBRA-TABS) 100 MG tablet   No No   Sig: Take 1 tablet (100 mg) by mouth 2 times daily   Patient not taking: Reported on 2023   empagliflozin (JARDIANCE) 25 MG TABS tablet   No No   Sig: Take 1 tablet (25 mg) by mouth daily   fluticasone (FLONASE) 50 MCG/ACT nasal spray   No No   Sig: Spray 1 spray into both nostrils daily Use at night before bed   glipiZIDE (GLUCOTROL) 5 MG tablet   No No   Sig: Take 1 tablet (5 mg) by mouth 2 times daily (before meals)   glucosamine-chondroitin 500-400 MG tablet   No No   Sig: Take 1 tablet by mouth daily   ipratropium - albuterol 0.5 mg/2.5 mg/3 mL (DUONEB) 0.5-2.5 (3) MG/3ML neb solution   No No   Sig: Take 1 vial (3 mLs) by nebulization every 6 hours as needed for shortness of breath or wheezing   ketotifen (ZADITOR) 0.025 % ophthalmic solution   Yes No   Sig: Place 1 drop into both eyes daily   levofloxacin (LEVAQUIN) 500 MG tablet    No No   Sig: Take 1 tablet (500 mg) by mouth daily   Patient not taking: Reported on 1/23/2023   multivitamin w/minerals (THERA-VIT-M) tablet   No No   Sig: Take 1 tablet by mouth daily   nicotine (NICODERM CQ) 14 MG/24HR 24 hr patch   No No   Sig: Place 1 patch onto the skin every 24 hours   nicotine (NICOTROL) 10 MG inhaler   No No   Sig: Use 1 cartridge as needed for urge to smoke by puffing over course of 20min.  Use 6-16 cart/day; reduce number of cart/day over 6-12 weeks.   Patient not taking: Reported on 1/23/2023   omega-3 acid ethyl esters (LOVAZA) 1 g capsule   No No   Sig: Take 2 capsules (2 g) by mouth 2 times daily Please call [phone] to schedule follow up visit for more refills.   omeprazole (PRILOSEC) 20 MG DR capsule   No No   Sig: Take 1 capsule (20 mg) by mouth 2 times daily   polyethylene glycol-propylene glycol (SYSTANE) 0.4-0.3 % SOLN ophthalmic solution   No No   Sig: Place 1 drop into both eyes 4 times daily   predniSONE (DELTASONE) 20 MG tablet   No No   Sig: Take 2 tablets (40 mg) by mouth daily   Patient not taking: Reported on 1/23/2023   pregabalin (LYRICA) 150 MG capsule   No No   Sig: Take 1 capsule (150 mg) by mouth 2 times daily   sodium chloride (NEBUSAL) 3 % neb solution   No No   Sig: Take 3 mLs by nebulization daily Use 1-2 times daily in combination with Duoneb and Aerobika to help with mucus clearance      Facility-Administered Medications: None        Review of Systems    The 10 point Review of Systems is negative other than noted in the HPI or here.      Physical Exam   Vital Signs: Temp: 98.1  F (36.7  C) Temp src: Oral BP: 127/73 Pulse: 89   Resp: 22 SpO2: 97 % O2 Device: Nasal cannula Oxygen Delivery: 3 LPM  Weight: 0 lbs 0 oz    Constitutional: A little sleepy during exam from what seems more of sleep than AMS; cooperative, no apparent distress.  HEENT: Moist mucous membranes  Respiratory: Appears without distress on 3L, sating at 97% but reports feeling sick and  uncomfortable; Clear to auscultation bilaterally, no crackles or wheezing. Difficult to appreciate air entry due to minimal breathing effort   Cardiovascular: Regular rate and rhythm, normal S1 and S2, and no murmur noted.  GI: Soft, non-distended, non-tender, normal bowel sounds.  Skin: No rashes, no cyanosis,  Musculoskeletal: No joint swelling, erythema or tenderness.  Neurologic: Normal strength and sensation.  Psychiatric: Alert, oriented to person, place and time, no obvious anxiety or depression      Medical Decision Making       100 MINUTES SPENT BY ME on the date of service doing chart review, history, exam, documentation & further activities per the note.      Data     I have personally reviewed the following data over the past 24 hrs:    7.5  \   13.7   / 190     141 100 7.2 (L) /  235 (H)   4.0 34 (H) 0.44 (L) \       ALT: 12 AST: 15 AP: 99 TBILI: 0.2   ALB: 4.1 TOT PROTEIN: 6.8 LIPASE: N/A       Procal: N/A CRP: N/A Lactic Acid: 0.9       INR:  1.02 PTT:  N/A   D-dimer:  N/A Fibrinogen:  N/A       Imaging results reviewed over the past 24 hrs:   Recent Results (from the past 24 hour(s))   XR Chest 2 Views    Narrative    EXAM: XR CHEST 2 VIEWS  LOCATION: [facility]  DATE/TIME: 2/3/2023 11:57 PM    INDICATION: SOB  COMPARISON: 2 view chest radiograph 01/04/2023      Impression    IMPRESSION: Denser appearance of right upper lobe nodule, again suspicious for malignancy. Flattening of the hemidiaphragms consistent with underlying emphysema. There is chronic appearing blunting of the right costophrenic angle. No new focal pulmonary   consolidation or effusion. Stable normal heart size. No pulmonary vascular congestion. No pneumothorax. No acute osseous abnormality.   CT Chest Pulmonary Embolism w Contrast    Narrative    EXAM: CT CHEST PULMONARY EMBOLISM W CONTRAST  LOCATION: [facility]  DATE/TIME: 2/4/2023 4:30  AM    INDICATION: SOB  COMPARISON: CT chest 01/01/2023  TECHNIQUE: CT chest pulmonary angiogram during arterial phase injection of IV contrast. Multiplanar reformats and MIP reconstructions were performed. Dose reduction techniques were used.   CONTRAST: iopamidol (ISOVUE 370) solution 69 mL       FINDINGS:  ANGIOGRAM CHEST: Pulmonary arteries are normal caliber and negative for pulmonary emboli. Thoracic aorta is negative for dissection. No CT evidence of right heart strain.    LUNGS AND PLEURA: Right lower lobectomy. Moderate emphysema. No significant change in a 2.3 x 1.5 cm spiculated nodule in the right upper lobe (series 7 image 94) with surrounding linear parenchymal opacities and subtle nodularity. Multiple tubular   opacities in the adjacent lung likely reflecting mucous plugging. No pleural effusion or pneumothorax.    MEDIASTINUM/AXILLAE: No lymphadenopathy. Normal esophagus. No significant pericardial effusion.    CORONARY ARTERY CALCIFICATION: Mild.    UPPER ABDOMEN: Normal.    MUSCULOSKELETAL: Mild degenerative changes.      Impression    IMPRESSION:  1.  No pulmonary embolism.  2.  No significant change in a 2.3 cm right upper lobe nodule suspicious for malignancy.  3.  Moderate emphysema.

## 2023-02-04 NOTE — ED PROVIDER NOTES
[place] EMERGENCY DEPARTMENT ([facility])  February 3, 2023  ED Provider Note  [facility]      History     Chief Complaint   Patient presents with     Shortness of Breath     HPI  Jenn Aparicio is a 67 year old female with a history of lung cancer, COPD, and asthma who presents to the emergency department with increased difficulty breathing with some blood-tinged sputum. No fevers but complains of some right-sided chest pain.  No vomiting, diarrhea, melena, or bright blood per rectum. Patient states she is not sure whether the blood is coming from her nose or whether is coming from her lungs. Patient states that she has her next scan for her lung cancer in another couple weeks with her oncologist.    This part of the medical record was transcribed by Lisa Morris Medical Scribe, from a dictation done by Edward Graves MD.     Past Medical History  Past Medical History:   Diagnosis Date     Adenocarcinoma, lung (H)      Asthma      Ectopic pregnancy      Esophageal reflux      Pulmonary emphysema (H)     Very severe FEV1<30% predicted     Type II diabetes mellitus (H)      Past Surgical History:   Procedure Laterality Date     2/8/16                R thoracotomy, RLL lobectomy (Dr. Cunha). Adenocarcinoma, 1.1 cm, assoicated with atypical adenomatous hyperplasia Right 02/08/2016    R thoracotomy, RLL lobectomy (Dr. Cunha). Adenocarcinoma, 1.1 cm, assoicated with atypical adenomatous hyperplasia     ESOPHAGOSCOPY, GASTROSCOPY, DUODENOSCOPY (EGD), COMBINED N/A 8/16/2022    Procedure: ESOPHAGOGASTRODUODENOSCOPY (EGD);  Surgeon: Travis Briseno MD;  Location:  GI     ORTHOPEDIC SURGERY       albuterol (PROAIR HFA/PROVENTIL HFA/VENTOLIN HFA) 108 (90 Base) MCG/ACT inhaler  alcohol swab prep pads  Alpha-Lipoic Acid 300 MG CAPS  amLODIPine (NORVASC) 10 MG tablet  aspirin 81 MG EC tablet  atorvastatin (LIPITOR) 20 MG tablet  benzonatate (TESSALON) 200 MG  capsule  cetirizine (ZYRTEC) 10 MG tablet  coenzyme Q-10 (CO-Q10) 50 MG capsule  Continuous Blood Gluc  (FREESTYLE GIOVANNI 2 READER) JOSEPHINE  Continuous Blood Gluc Sensor (FREESTYLE GIOVANNI 14 DAY SENSOR) MISC  Continuous Blood Gluc Sensor (FREESTYLE GIOVANNI 2 SENSOR) MISC  cyclobenzaprine (FLEXERIL) 5 MG tablet  doxycycline hyclate (VIBRA-TABS) 100 MG tablet  Easy Comfort Lancets MISC  empagliflozin (JARDIANCE) 25 MG TABS tablet  fluticasone (FLONASE) 50 MCG/ACT nasal spray  Fluticasone-Umeclidin-Vilanterol (TRELEGY ELLIPTA) 200-62.5-25 MCG/INH oral inhaler  glipiZIDE (GLUCOTROL) 5 MG tablet  glucosamine-chondroitin 500-400 MG tablet  ipratropium - albuterol 0.5 mg/2.5 mg/3 mL (DUONEB) 0.5-2.5 (3) MG/3ML neb solution  ketotifen (ZADITOR) 0.025 % ophthalmic solution  Lancet Devices (EASY MINI EJECT LANCING DEVICE) MISC  levofloxacin (LEVAQUIN) 500 MG tablet  multivitamin w/minerals (THERA-VIT-M) tablet  nicotine (NICODERM CQ) 14 MG/24HR 24 hr patch  nicotine (NICOTROL) 10 MG inhaler  omega-3 acid ethyl esters (LOVAZA) 1 g capsule  omeprazole (PRILOSEC) 20 MG DR capsule  polyethylene glycol-propylene glycol (SYSTANE) 0.4-0.3 % SOLN ophthalmic solution  predniSONE (DELTASONE) 20 MG tablet  pregabalin (LYRICA) 150 MG capsule  sodium chloride (NEBUSAL) 3 % neb solution  STATIN NOT PRESCRIBED (INTENTIONAL)      Allergies   Allergen Reactions     Interferons Dermatitis     Penicillins Hives     Aspirin      325mg      Colon Care      Family History  Family History   Problem Relation Age of Onset     Lung Cancer Sister      Depression Daughter      Alcohol/Drug Other         self     Diabetes Other         self     Thyroid Disease Other         self     Asthma Other         self     Social History   Social History     Tobacco Use     Smoking status: Every Day     Packs/day: 0.25     Types: Cigarettes     Smokeless tobacco: Former     Tobacco comments:     01/02/2023 Patient using 14 mg patch, wants to have prescription for  Nicotrol inhaler, took workbook   Substance Use Topics     Alcohol use: Yes     Comment: sometime     Drug use: No      Past medical history, past surgical history, medications, allergies, family history, and social history were reviewed with the patient. No additional pertinent items.      A medically appropriate review of systems was performed with pertinent positives and negatives noted in the HPI, and all other systems negative.    Physical Exam   BP: 128/68  Pulse: 102  Temp: 98.1  F (36.7  C)  Resp: 20  SpO2: 99 %  Physical Exam  Vitals and nursing note reviewed.   Constitutional:       Comments: Elderly and appears frail   HENT:      Head: Atraumatic.   Eyes:      Extraocular Movements: Extraocular movements intact.      Pupils: Pupils are equal, round, and reactive to light.   Cardiovascular:      Rate and Rhythm: Regular rhythm.   Pulmonary:      Comments: Breath sounds decreased bilaterally  Neurological:      General: No focal deficit present.      Mental Status: She is alert and oriented to person, place, and time.   Psychiatric:         Mood and Affect: Mood normal.           ED Course, Procedures, & Data      Procedures          Results for orders placed or performed during the hospital encounter of 02/03/23   XR Chest 2 Views     Status: None    Narrative    EXAM: XR CHEST 2 VIEWS  LOCATION: [facility]  DATE/TIME: 2/3/2023 11:57 PM    INDICATION: SOB  COMPARISON: 2 view chest radiograph 01/04/2023      Impression    IMPRESSION: Denser appearance of right upper lobe nodule, again suspicious for malignancy. Flattening of the hemidiaphragms consistent with underlying emphysema. There is chronic appearing blunting of the right costophrenic angle. No new focal pulmonary   consolidation or effusion. Stable normal heart size. No pulmonary vascular congestion. No pneumothorax. No acute osseous abnormality.   INR     Status: Normal   Result Value Ref Range    INR  1.02 0.85 - 1.15   Comprehensive metabolic panel     Status: Abnormal   Result Value Ref Range    Sodium 141 136 - 145 mmol/L    Potassium 4.0 3.4 - 5.3 mmol/L    Chloride 100 98 - 107 mmol/L    Carbon Dioxide (CO2) 34 (H) 22 - 29 mmol/L    Anion Gap 7 7 - 15 mmol/L    Urea Nitrogen 7.2 (L) 8.0 - 23.0 mg/dL    Creatinine 0.44 (L) 0.51 - 0.95 mg/dL    Calcium 9.4 8.8 - 10.2 mg/dL    Glucose 235 (H) 70 - 99 mg/dL    Alkaline Phosphatase 99 35 - 104 U/L    AST 15 10 - 35 U/L    ALT 12 10 - 35 U/L    Protein Total 6.8 6.4 - 8.3 g/dL    Albumin 4.1 3.5 - 5.2 g/dL    Bilirubin Total 0.2 <=1.2 mg/dL    GFR Estimate >90 >60 mL/min/1.73m2   Lactic acid whole blood     Status: Normal   Result Value Ref Range    Lactic Acid 0.9 0.7 - 2.0 mmol/L   CBC with platelets and differential     Status: Abnormal   Result Value Ref Range    WBC Count 7.5 4.0 - 11.0 10e3/uL    RBC Count 4.97 3.80 - 5.20 10e6/uL    Hemoglobin 13.7 11.7 - 15.7 g/dL    Hematocrit 46.8 35.0 - 47.0 %    MCV 94 78 - 100 fL    MCH 27.6 26.5 - 33.0 pg    MCHC 29.3 (L) 31.5 - 36.5 g/dL    RDW 13.4 10.0 - 15.0 %    Platelet Count 190 150 - 450 10e3/uL    % Neutrophils 67 %    % Lymphocytes 21 %    % Monocytes 7 %    % Eosinophils 4 %    % Basophils 1 %    % Immature Granulocytes 0 %    NRBCs per 100 WBC 0 <1 /100    Absolute Neutrophils 5.1 1.6 - 8.3 10e3/uL    Absolute Lymphocytes 1.6 0.8 - 5.3 10e3/uL    Absolute Monocytes 0.5 0.0 - 1.3 10e3/uL    Absolute Eosinophils 0.3 0.0 - 0.7 10e3/uL    Absolute Basophils 0.0 0.0 - 0.2 10e3/uL    Absolute Immature Granulocytes 0.0 <=0.4 10e3/uL    Absolute NRBCs 0.0 10e3/uL   EKG 12-lead, tracing only     Status: None (Preliminary result)   Result Value Ref Range    Systolic Blood Pressure  mmHg    Diastolic Blood Pressure  mmHg    Ventricular Rate 100 BPM    Atrial Rate 100 BPM    SD Interval 154 ms    QRS Duration 66 ms     ms    QTc 443 ms    P Axis 78 degrees    R AXIS 60 degrees    T Axis 66 degrees     Interpretation ECG       Sinus rhythm with Premature atrial complexes  Septal infarct , age undetermined  Abnormal ECG     CBC with platelets differential     Status: Abnormal    Narrative    The following orders were created for panel order CBC with platelets differential.  Procedure                               Abnormality         Status                     ---------                               -----------         ------                     CBC with platelets and d...[[id number]]  Abnormal            Final result                 Please view results for these tests on the individual orders.     Medications   sodium chloride (PF) 0.9% PF flush 3 mL (has no administration in time range)   sodium chloride (PF) 0.9% PF flush 3 mL (has no administration in time range)   sodium chloride 0.9% infusion ( Intravenous New Bag 2/3/23 2337)   albuterol (PROVENTIL HFA/VENTOLIN HFA) inhaler (2 puffs Inhalation Given 2/3/23 2337)          Medical Decision Making  The patient's presentation is strongly suggestive of a chronic illness mild to moderate exacerbation, progression, or side effect of treatment.    The patient's evaluation involved:  ordering and/or review of 3+ test(s) in this encounter (See above)    The patient's management involved a decision regarding hospitalization.      Assessment & Plan      I have reviewed the nursing notes.    Patient will undergo chest CT pulmonary angiogram to rule out PE exacerbating her COPD.  This will be signed out to my partners who will follow up with the results and arrange for the most appropriate disposition for this patient.    Chest CT pending    Working diagnoses:   COPD exacerbation (H)       Edward Graves MD  [facility] EMERGENCY DEPARTMENT  2/3/2023     Edward Graves MD  02/04/23 0100

## 2023-02-04 NOTE — ED NOTES
5:13 AM patient was signed out to me pending the results of the CT scan of the chest.  Briefly she is 67 years old has a history of COPD and is oxygen dependent 3 L by nasal cannula.  She comes in because of coarse cough and desaturations with any exertion.  CT scan shows no evidence for pulmonary embolism or infiltrate a upper lobe lesion suspicious for malignancy.  I gave the patient the option of admission or discharge she feels that it would be best if she was admitted given the the difficulty she has with ambulation she likely would return to the ED     Albert Laura MD  02/04/23 0514

## 2023-02-04 NOTE — ED TRIAGE NOTES
Triage Assessment     Row Name 02/03/23 5082       Triage Assessment (Adult)    Airway WDL WDL       Respiratory WDL    Respiratory WDL X  SOB       Skin Circulation/Temperature WDL    Skin Circulation/Temperature WDL WDL       Cardiac WDL    Cardiac WDL WDL       Peripheral/Neurovascular WDL    Peripheral Neurovascular WDL WDL       Cognitive/Neuro/Behavioral WDL    Cognitive/Neuro/Behavioral WDL WDL

## 2023-02-04 NOTE — ED TRIAGE NOTES
Pt BIBA c/o shortness of breath past few days, new cough with blood in sputum as per pt, home O2 3L, 4L with EMS, sats drop with any type of activity, pt takes puffers and nebs at home, hx COPD, asthma, lung Ca, DM2

## 2023-02-04 NOTE — ED NOTES
Bed: ED21  Expected date:   Expected time:   Means of arrival:   Comments:  [facility]

## 2023-02-05 ENCOUNTER — APPOINTMENT (OUTPATIENT)
Dept: OCCUPATIONAL THERAPY | Facility: CLINIC | Age: 68
DRG: 190 | End: 2023-02-05
Attending: INTERNAL MEDICINE
Payer: COMMERCIAL

## 2023-02-05 ENCOUNTER — APPOINTMENT (OUTPATIENT)
Dept: PHYSICAL THERAPY | Facility: CLINIC | Age: 68
DRG: 190 | End: 2023-02-05
Attending: INTERNAL MEDICINE
Payer: COMMERCIAL

## 2023-02-05 LAB
ALBUMIN SERPL BCG-MCNC: 3.5 G/DL (ref 3.5–5.2)
ALP SERPL-CCNC: 81 U/L (ref 35–104)
ALT SERPL W P-5'-P-CCNC: 8 U/L (ref 10–35)
ANION GAP SERPL CALCULATED.3IONS-SCNC: 9 MMOL/L (ref 7–15)
AST SERPL W P-5'-P-CCNC: 15 U/L (ref 10–35)
ATRIAL RATE - MUSE: 100 BPM
BASOPHILS # BLD AUTO: 0 10E3/UL (ref 0–0.2)
BASOPHILS NFR BLD AUTO: 0 %
BILIRUB SERPL-MCNC: 0.2 MG/DL
BUN SERPL-MCNC: 10.3 MG/DL (ref 8–23)
CALCIUM SERPL-MCNC: 8.9 MG/DL (ref 8.8–10.2)
CHLORIDE SERPL-SCNC: 103 MMOL/L (ref 98–107)
CREAT SERPL-MCNC: 0.5 MG/DL (ref 0.51–0.95)
DEPRECATED HCO3 PLAS-SCNC: 30 MMOL/L (ref 22–29)
DIASTOLIC BLOOD PRESSURE - MUSE: NORMAL MMHG
EOSINOPHIL # BLD AUTO: 0 10E3/UL (ref 0–0.7)
EOSINOPHIL NFR BLD AUTO: 0 %
ERYTHROCYTE [DISTWIDTH] IN BLOOD BY AUTOMATED COUNT: 13.4 % (ref 10–15)
GFR SERPL CREATININE-BSD FRML MDRD: >90 ML/MIN/1.73M2
GLUCOSE BLDC GLUCOMTR-MCNC: 118 MG/DL (ref 70–99)
GLUCOSE BLDC GLUCOMTR-MCNC: 143 MG/DL (ref 70–99)
GLUCOSE BLDC GLUCOMTR-MCNC: 154 MG/DL (ref 70–99)
GLUCOSE BLDC GLUCOMTR-MCNC: 285 MG/DL (ref 70–99)
GLUCOSE BLDC GLUCOMTR-MCNC: 319 MG/DL (ref 70–99)
GLUCOSE SERPL-MCNC: 110 MG/DL (ref 70–99)
HCT VFR BLD AUTO: 40.1 % (ref 35–47)
HGB BLD-MCNC: 12.2 G/DL (ref 11.7–15.7)
IMM GRANULOCYTES # BLD: 0.1 10E3/UL
IMM GRANULOCYTES NFR BLD: 1 %
INTERPRETATION ECG - MUSE: NORMAL
LYMPHOCYTES # BLD AUTO: 1.7 10E3/UL (ref 0.8–5.3)
LYMPHOCYTES NFR BLD AUTO: 19 %
MCH RBC QN AUTO: 27.9 PG (ref 26.5–33)
MCHC RBC AUTO-ENTMCNC: 30.4 G/DL (ref 31.5–36.5)
MCV RBC AUTO: 92 FL (ref 78–100)
MONOCYTES # BLD AUTO: 0.8 10E3/UL (ref 0–1.3)
MONOCYTES NFR BLD AUTO: 8 %
NEUTROPHILS # BLD AUTO: 6.5 10E3/UL (ref 1.6–8.3)
NEUTROPHILS NFR BLD AUTO: 72 %
NRBC # BLD AUTO: 0 10E3/UL
NRBC BLD AUTO-RTO: 0 /100
P AXIS - MUSE: 78 DEGREES
PLATELET # BLD AUTO: 159 10E3/UL (ref 150–450)
POTASSIUM SERPL-SCNC: 4.1 MMOL/L (ref 3.4–5.3)
PR INTERVAL - MUSE: 154 MS
PROT SERPL-MCNC: 5.9 G/DL (ref 6.4–8.3)
QRS DURATION - MUSE: 66 MS
QT - MUSE: 344 MS
QTC - MUSE: 443 MS
R AXIS - MUSE: 60 DEGREES
RBC # BLD AUTO: 4.38 10E6/UL (ref 3.8–5.2)
SODIUM SERPL-SCNC: 142 MMOL/L (ref 136–145)
SYSTOLIC BLOOD PRESSURE - MUSE: NORMAL MMHG
T AXIS - MUSE: 66 DEGREES
VENTRICULAR RATE- MUSE: 100 BPM
WBC # BLD AUTO: 9 10E3/UL (ref 4–11)

## 2023-02-05 PROCEDURE — 250N000013 HC RX MED GY IP 250 OP 250 PS 637: Performed by: INTERNAL MEDICINE

## 2023-02-05 PROCEDURE — 120N000002 HC R&B MED SURG/OB UMMC

## 2023-02-05 PROCEDURE — 97161 PT EVAL LOW COMPLEX 20 MIN: CPT | Mod: GP

## 2023-02-05 PROCEDURE — 250N000009 HC RX 250

## 2023-02-05 PROCEDURE — 97535 SELF CARE MNGMENT TRAINING: CPT | Mod: GO

## 2023-02-05 PROCEDURE — 85025 COMPLETE CBC W/AUTO DIFF WBC: CPT | Performed by: INTERNAL MEDICINE

## 2023-02-05 PROCEDURE — 94640 AIRWAY INHALATION TREATMENT: CPT

## 2023-02-05 PROCEDURE — 36415 COLL VENOUS BLD VENIPUNCTURE: CPT | Performed by: INTERNAL MEDICINE

## 2023-02-05 PROCEDURE — 80053 COMPREHEN METABOLIC PANEL: CPT | Performed by: INTERNAL MEDICINE

## 2023-02-05 PROCEDURE — 99233 SBSQ HOSP IP/OBS HIGH 50: CPT | Performed by: INTERNAL MEDICINE

## 2023-02-05 PROCEDURE — 94640 AIRWAY INHALATION TREATMENT: CPT | Mod: 76

## 2023-02-05 PROCEDURE — 97165 OT EVAL LOW COMPLEX 30 MIN: CPT | Mod: GO

## 2023-02-05 PROCEDURE — 97530 THERAPEUTIC ACTIVITIES: CPT | Mod: GP

## 2023-02-05 PROCEDURE — 97110 THERAPEUTIC EXERCISES: CPT | Mod: GP

## 2023-02-05 PROCEDURE — 999N000157 HC STATISTIC RCP TIME EA 10 MIN

## 2023-02-05 PROCEDURE — 250N000011 HC RX IP 250 OP 636

## 2023-02-05 PROCEDURE — 250N000013 HC RX MED GY IP 250 OP 250 PS 637

## 2023-02-05 RX ORDER — PREDNISONE 20 MG/1
60 TABLET ORAL DAILY
Status: COMPLETED | OUTPATIENT
Start: 2023-02-06 | End: 2023-02-09

## 2023-02-05 RX ADMIN — CETIRIZINE HYDROCHLORIDE 10 MG: 10 TABLET, FILM COATED ORAL at 09:29

## 2023-02-05 RX ADMIN — IPRATROPIUM BROMIDE AND ALBUTEROL SULFATE 3 ML: 2.5; .5 SOLUTION RESPIRATORY (INHALATION) at 13:12

## 2023-02-05 RX ADMIN — BENZONATATE 200 MG: 100 CAPSULE ORAL at 21:53

## 2023-02-05 RX ADMIN — PREGABALIN 150 MG: 100 CAPSULE ORAL at 09:28

## 2023-02-05 RX ADMIN — BENZONATATE 200 MG: 100 CAPSULE ORAL at 10:41

## 2023-02-05 RX ADMIN — ACETAMINOPHEN 650 MG: 325 TABLET, FILM COATED ORAL at 10:41

## 2023-02-05 RX ADMIN — AMLODIPINE BESYLATE 10 MG: 10 TABLET ORAL at 09:29

## 2023-02-05 RX ADMIN — EMPAGLIFLOZIN 25 MG: 25 TABLET, FILM COATED ORAL at 09:29

## 2023-02-05 RX ADMIN — FLUTICASONE PROPIONATE 1 SPRAY: 50 SPRAY, METERED NASAL at 10:35

## 2023-02-05 RX ADMIN — GUAIFENESIN 600 MG: 600 TABLET ORAL at 09:29

## 2023-02-05 RX ADMIN — GLIPIZIDE 5 MG: 5 TABLET ORAL at 09:29

## 2023-02-05 RX ADMIN — INSULIN ASPART 4 UNITS: 100 INJECTION, SOLUTION INTRAVENOUS; SUBCUTANEOUS at 17:13

## 2023-02-05 RX ADMIN — ENOXAPARIN SODIUM 40 MG: 40 INJECTION SUBCUTANEOUS at 09:28

## 2023-02-05 RX ADMIN — UMECLIDINIUM 1 PUFF: 62.5 AEROSOL, POWDER ORAL at 10:35

## 2023-02-05 RX ADMIN — LEVOFLOXACIN 500 MG: 5 INJECTION, SOLUTION INTRAVENOUS at 06:40

## 2023-02-05 RX ADMIN — Medication 1 TABLET: at 09:29

## 2023-02-05 RX ADMIN — PREGABALIN 150 MG: 100 CAPSULE ORAL at 20:03

## 2023-02-05 RX ADMIN — FLUTICASONE FUROATE AND VILANTEROL TRIFENATATE 1 PUFF: 200; 25 POWDER RESPIRATORY (INHALATION) at 10:35

## 2023-02-05 RX ADMIN — IPRATROPIUM BROMIDE AND ALBUTEROL SULFATE 3 ML: 2.5; .5 SOLUTION RESPIRATORY (INHALATION) at 08:17

## 2023-02-05 RX ADMIN — INSULIN ASPART 1 UNITS: 100 INJECTION, SOLUTION INTRAVENOUS; SUBCUTANEOUS at 13:16

## 2023-02-05 RX ADMIN — GUAIFENESIN 600 MG: 600 TABLET ORAL at 20:03

## 2023-02-05 RX ADMIN — ASPIRIN 81 MG: 81 TABLET ORAL at 09:29

## 2023-02-05 RX ADMIN — IPRATROPIUM BROMIDE AND ALBUTEROL SULFATE 3 ML: 2.5; .5 SOLUTION RESPIRATORY (INHALATION) at 20:18

## 2023-02-05 RX ADMIN — PANTOPRAZOLE SODIUM 40 MG: 40 TABLET, DELAYED RELEASE ORAL at 09:28

## 2023-02-05 RX ADMIN — METHYLPREDNISOLONE SODIUM SUCCINATE 62.5 MG: 125 INJECTION, POWDER, FOR SOLUTION INTRAMUSCULAR; INTRAVENOUS at 09:25

## 2023-02-05 RX ADMIN — PANTOPRAZOLE SODIUM 40 MG: 40 TABLET, DELAYED RELEASE ORAL at 20:03

## 2023-02-05 RX ADMIN — GLIPIZIDE 5 MG: 5 TABLET ORAL at 17:13

## 2023-02-05 ASSESSMENT — ACTIVITIES OF DAILY LIVING (ADL)
ADLS_ACUITY_SCORE: 33
ADLS_ACUITY_SCORE: 33
ADLS_ACUITY_SCORE: 34
ADLS_ACUITY_SCORE: 33
ADLS_ACUITY_SCORE: 32
ADLS_ACUITY_SCORE: 33
ADLS_ACUITY_SCORE: 34
ADLS_ACUITY_SCORE: 33
ADLS_ACUITY_SCORE: 34
ADLS_ACUITY_SCORE: 33

## 2023-02-05 NOTE — PLAN OF CARE
Problem: Plan of Care - These are the overarching goals to be used throughout the patient stay.    Goal: Plan of Care Review  Description: The Plan of Care Review/Shift note should be completed every shift.  The Outcome Evaluation is a brief statement about your assessment that the patient is improving, declining, or no change.  This information will be displayed automatically on your shift note.  Flowsheets (Taken 2/5/2023 0306)  Outcome Evaluation: -Monitor Cardiopulmonary status, -Maintain O2 saturation >90% on 3L home baseline Oxygen. -Increase activity tolerance.  Plan of Care Reviewed With: patient  Overall Patient Progress: improving     Problem: Gas Exchange Impaired  Goal: Optimal Gas Exchange  Outcome: Progressing  Intervention: Optimize Oxygenation and Ventilation  Flowsheets  Taken 2/5/2023 0306  Airway/Ventilation Management: (IS provided and education given)    airway patency maintained    humidification applied    calming measures promoted    pulmonary hygiene promoted  Head of Bed (HOB) Positioning: HOB at 20-30 degrees  Taken 2/5/2023 0000  Head of Bed (HOB) Positioning: HOB at 20-30 degrees      Goal Outcome Evaluation:      Plan of Care Reviewed With: patient    Overall Patient Progress: improvingOverall Patient Progress: improving    Outcome Evaluation: -Monitor Cardiopulmonary status, -Maintain O2 saturation >90% on 3L home baseline Oxygen. -Increase activity tolerance.    Neuros:  Alert and oriented x 4, calls appropriately.   Vitals: Vitally stable on 3L O2 home baseline via NC. Was desating to the 88% and O2 was bumped to 4L with saturation of 97%. Humidifier applied and IS use encouraged.   Pain/discomfort: Denies.  Resp: Dyspneic on exertion. Bilateral lung coarseness, but RLL diminished ->absent d/t RLL lobectomy   GI: No BM this shift.  : Voiding spontaneously in bedside commode.   Cardiovascular: On tele with intermittent sinus tachycardia.   Lines/Daines: Right saline locked.  Skin:  Blanchable redness on the bottom with bruising in bilateral upper and lower extremities and scabs, otherwise, WDL  Diet: Regular diet.   Labs: ,   Activity: Up with SBA to bedside commode.   Recommendations/Plan: Will continue to monitor: -Cardiopulmonary status, -Maintain O2 saturation >90% on 3L home baseline Oxygen. -Increase activity tolerance, -assess response to interventions, update MDs with concerns and changes, and follow POC

## 2023-02-05 NOTE — SUMMARY OF CARE
Reason for admission: COPD exacerbation   Admitted from: ED  Report received from: Pamella NEELY RN   2 RN skin assessment completed by:Radha        -Findings (add LDA if needed): no blanchable red areas. Lower extremity edema. Small scab like bump on the lower R leg that pt reported has been there for a while.  Bed Algorithm reevaluated: yes  Was Pulsate ordered?:  no  Care plan (primary problem) and Education initiated:  yes  Flu shot ordered (October-April only): already received flu shot   Detailed Belongings: see NST summary of care note         RN Decompensation Assessment (Repeat at 12 hours)    (Additional guidance for RRT)      Mentation:  Exam is notable for normal (baseline) mental status    Respiratory mechanics and oxygenation:  Exam is notable for normal/unchanged breathing pattern and stable oxygen requirements    Cardiovascular/perfusion:   Exam is notable for normal/unchanged cardiovascular/perfusion assessment    Overall estimation of the patient s condition/stability (1 = stable patient, 7 = appears very sick)? 1 - Patient is stable without signs of deterioration

## 2023-02-05 NOTE — PROGRESS NOTES
"Occupational Therapy   02/05/23 1119   Appointment Info   Signing Clinician's Name / Credentials (OT) Shahnaz Speikers, OTR/L   Living Environment   People in Home alone   Current Living Arrangements apartment   Home Accessibility   (bed and bathroom on one level, laundry in unit, trash chute in hallway on same level, \"it's a big place\")   Transportation Anticipated family or friend will provide   Living Environment Comments patient report recent move  from house to current residence last November.   Self-Care   Usual Activity Tolerance good   Current Activity Tolerance moderate   Equipment Currently Used at Home walker, rolling;shower chair  (4ww, home o2 and reports  limited to \"1 hour\" o2 for community mobility)   Fall history within last six months yes  (reports fall outside home prior to moving to current resident, not able to recall date.)   Number of times patient has fallen within last six months 1   Instrumental Activities of Daily Living (IADL)   Previous Responsibilities medication management;finances;driving;shopping;laundry;housekeeping;meal prep   IADL Comments Per report report, was living alone, independent with ADL/IADL baseline, including driving and cares for pet dog, \"Mickleton.\" Presently has family with driving as limited o2 to \"1 hour\" when leaving home.   General Information   Onset of Illness/Injury or Date of Surgery 02/03/23   Referring Physician Michael Logan MD   Patient/Family Therapy Goal Statement (OT) increase ADL/IADL independence, return home and increase community mobility>1 hour   Additional Occupational Profile Info/Pertinent History of Current Problem per chart review, history of chronic resp failure on 3L home O2, metastatic NSCLC, COPD- recently admitted for COPD exacerbation.   Existing Precautions/Restrictions fall   Left Upper Extremity (Weight-bearing Status) weight-bearing as tolerated (WBAT)   Right Upper Extremity (Weight-bearing Status) weight-bearing as tolerated " (WBAT)   Left Lower Extremity (Weight-bearing Status) weight-bearing as tolerated (WBAT)   Right Lower Extremity (Weight-bearing Status) weight-bearing as tolerated (WBAT)   Cognitive Status Examination   Follows Commands follows one-step commands;increased processing time needed;repetition of directions required;verbal cues/prompting required  (inconsistently)   Cognitive Screens/Assessments   Cognitive Assessments Completed Other Cognitive Screen/Assessment  (The Short Blessed Test)   Blessed Orientation-Memory-Concentration Test:  Total Weighted Score out of 28 11   Blessed Orientation-Memory-Concentration Interpretation Patient completed a cognitive screen today, the Short Blessed Test, scoring 11-further assessment warranted. Recommend increased cognitive assistance at this time to help increase safety with ADL, assist with IADL (e.g., medication management and driving), use call light bed tab/alarm for decrease falls risk during admit.   Visual Perception   Visual Impairment/Limitations corrective lenses full-time   Sensory   Sensory Comments neuropathy distal bilateral hand noted   Range of Motion Comprehensive   General Range of Motion upper extremity range of motion deficits identified   Comment, General Range of Motion shoulder flexion bilateral UE to 90 degree with verbal cues (report left IV pain with movement, declined further AROM)  (elbow, forearm, wrist WFL)   Coordination   Upper Extremity Coordination   ( strength BNL)   Gross Motor Coordination WFL with ADL   Coordination Comments Right hand dominant   Transfers   Transfers transfer safety analysis;bed-chair transfer;sit-stand transfer;toilet transfer;shower transfer   Sit-Stand Transfer   Sit-Stand Roosevelt (Transfers) supervision;verbal cues   Assistive Device (Sit-Stand Transfers) walker, front-wheeled   Toilet Transfer   Type (Toilet Transfer) sit-stand   Roosevelt Level (Toilet Transfer) supervision;verbal cues   Assistive Device  (Toilet Transfer) grab bars/safety frame   Activities of Daily Living   BADL Assessment/Intervention bathing;upper body dressing;lower body dressing;grooming;toileting   Bathing Assessment/Intervention   Gillespie Level (Bathing) supervision   Upper Body Dressing Assessment/Training   Gillespie Level (Upper Body Dressing) supervision   Lower Body Dressing Assessment/Training   Gillespie Level (Lower Body Dressing) supervision   Grooming Assessment/Training   Gillespie Level (Grooming) supervision   Toileting   Gillespie Level (Toileting) supervision   Clinical Impression   Criteria for Skilled Therapeutic Interventions Met (OT) Yes, treatment indicated   OT Diagnosis decreased independence with ADL/IADL.   OT Problem List-Impairments impacting ADL problems related to;activity tolerance impaired;cognition;mobility;sensation   Identified Performance Deficits bathing, dressing, toileting,  meal prep, medication management, driving, health management.   Planned Therapy Interventions (OT) ADL retraining;IADL retraining;cognition;home program guidelines;progressive activity/exercise;risk factor education   Clinical Decision Making Complexity (OT) low complexity   Anticipated Equipment Needs Upon Discharge (OT) bathing equipment;toileting equipment   Risk & Benefits of therapy have been explained evaluation/treatment results reviewed;care plan/treatment goals reviewed;risks/benefits reviewed;current/potential barriers reviewed;participants voiced agreement with care plan;participants included;patient   OT Total Evaluation Time   OT Eval, Low Complexity Minutes (59817) 30   OT Goals   Therapy Frequency (OT) Daily   OT Predicted Duration/Target Date for Goal Attainment 02/11/23   OT Goals Lower Body Dressing;Hygiene/Grooming;Transfers;Toilet Transfer/Toileting;Cognition   OT: Hygiene/Grooming modified independent   OT: Lower Body Dressing Modified independent   OT: Transfer Modified independent   OT: Toilet  Transfer/Toileting Modified independent   OT: Cognitive Patient/caregiver will verbalize understanding of cognitive assessment results/recommendations as needed for safe discharge planning   OT: Goal 1 Patient will participate in 15 min therapeutic exercise to increase independence with ADL/IADL.   OT Discharge Planning   OT Plan grooming hygiene sinkside, review safe ADL transfers (tub/shower, chair), UE ex/endurance, cog screen   OT Discharge Recommendation (DC Rec) home with home care occupational therapy   OT Rationale for DC Rec patient presents with decreased activity tolerance and independence with ADL/IADL, score on the SBTscreen today indicating further cognitive assessment recommended. Patient will benefit from ongoing skilled occupational therapy to increase safety and independence with ADL / IADL, further assess cognitive function as it pertains to safety with return to ADL/IADL. - Shahnaz Lucero, OTR/L   OT Brief overview of current status SBA w/functional mobility with FWW.   Total Session Time   Total Session Time (sum of timed and untimed services) 30

## 2023-02-05 NOTE — PLAN OF CARE
RN assumed cares at 1900 to 2300    Vitals: Temp: 98.7 via oral src, resp: 20, pulse: 107, BP: 141/67, O2 95% at 3L via NC  Pain: pain reported only when actively coughing, no pain medication requested  Neuro: A/O  Cardiac: Tachycardic, on Tele. Tele patches replaced this shift  Respiratory: Abnormal breath sounds, cough, 3L O2 at baseline   GI/: WDL  IV/Drains: 1 PIV saline locked  Activity: independent to bedside commode, SBA with walker to ambulate out of room   Skin: no blanchable red areas. Lower extremity edema. Small scab like bump on the lower R leg that pt reported has been there for a while   Labs: BG checks ACHS and 0200, BG elevated possibly due to steroid use     Plan of Care:  Monitor O2, tele, and BG. Pt wants to discus portible O2 options before discharge. Continue to monitor and contact MD as needed

## 2023-02-05 NOTE — PROGRESS NOTES
"   02/05/23 0849   Appointment Info   Signing Clinician's Name / Credentials (PT) Jenn Yun, PT, DPT   Living Environment   People in Home alone   Current Living Arrangements apartment   Home Accessibility   (no stairs but long walk to access (equivalent to 1/2 block to a block or so) except if daughter drives her then can access building from underground garage- much shorter distance to walk per pt)   Living Environment Comments Bathroom includes tub shower- has grab bar that clamps onto tub, standard toilet (no grab bars-wanting to get some- going to ask her CADI waiver)   Self-Care   Usual Activity Tolerance moderate   Current Activity Tolerance fair   Equipment Currently Used at Home cane, straight;grab bar, tub/shower;walker, rolling   Activity/Exercise/Self-Care Comment Pt has 2 4WW (uses 1 outside & 1 inside) & also has cane; uses walker more often. Uses 3L O2 at home. Reports portable O2 device that she takes with her runs out (of charge or O2, unclear) after 1 hr and this is not long enough for her. Pt's daughter assists with taking her to grocery store, pt uses cart she can sit it but this trip is still fatiguing. Pt reports she can access building via undergroung garage if her daughter drives her but if she gets a cab or other transport she has to walk ~1/2 a block to a block & this is challenging due to her shortness of breath and fatigue.   General Information   Onset of Illness/Injury or Date of Surgery 02/03/23   Referring Physician Michael Logan MD   Patient/Family Therapy Goals Statement (PT) to be less short of breath, to go home at discharge   Pertinent History of Current Problem (include personal factors and/or comorbidities that impact the POC) Per chart, \"66 y/o F with hx of chronic resp failure on 3L home O2, metastatic NSCLC, COPD who is being admitted for progressive SOB. Patient was recently admitted for COPD exacerbation, states she can hardly catch her breath during exertion and " "her oxygen saturation drops signficantly. Does have some cough. CT PE negative, has trace edema, no wheezing on exam. Patient currently on baseline 3L at rest. Unclear if this is true COPD exacerbation or just worsening of her baseline respiration. Afebrile and HDS\"   Existing Precautions/Restrictions oxygen therapy device and L/min   Cognition   Orientation Status (Cognition) oriented x 4   Pain Assessment   Patient Currently in Pain Yes, see Vital Sign flowsheet  (chest pain/pressure when coughing & \"it just sits there\" 8/10- reports it has been at this same level throughout admission; also reports B foot & LE neuropathy pain baseline)   Integumentary/Edema   Integumentary/Edema Comments B LE edema noted   Posture    Posture Forward head position;Protracted shoulders   Range of Motion (ROM)   ROM Comment ROM WFL, pt able to reach to don socks ind'lly in bed   Strength (Manual Muscle Testing)   Strength Comments WFL for transfers from EOB but does demo some generalized weakness with seated resisted testing: B hip flexion 3-/5, B knee extension 4-/5, hip ABD/ADD slightly weak but  symmetrical and WFL, ankle DF 4/5   Bed Mobility   Comment, (Bed Mobility) Supine<>sit mod ind with HOB elevated and use of bedrail, may need Ax1 for flat bed; some shortness of breath with this, needs rest prior to further mobility   Transfers   Comment, (Transfers) Sit<>stand from EOB with walker mod ind   Gait/Stairs (Locomotion)   Comment, (Gait/Stairs) Patient able to take steps away from bed with walker and S/assist for IV pole/O2 tank, slow but steady, relies on UE support on walker for stability; fatgiues quickly   Balance   Balance Comments Impaired balance 2/2 insensate feet, compensates well with walker- steady with walker   Sensory Examination   Sensory Perception Comments reports baseline neuropathy B feet & hands with numbness/tingling & pain. Pt reports insensate soles of feet; with light touch B feet dorsum & soles of feet " "insensate & diminished sensation ankle, sensation intact upper lower leg.Pt reports when walking she feels like there are \"bubbles\" under her feet   Clinical Impression   Criteria for Skilled Therapeutic Intervention Yes, treatment indicated   PT Diagnosis (PT) decreased functional mobilty and independence   Influenced by the following impairments decreased strength, decreased activity tolerance, impaired functional endurance, impaired balance   Functional limitations due to impairments decreased independence with bed mobility, ambulation, decreased functional endurance   Clinical Presentation (PT Evaluation Complexity) Stable/Uncomplicated   Clinical Presentation Rationale clinical judgement   Clinical Decision Making (Complexity) low complexity   Planned Therapy Interventions (PT) balance training;bed mobility training;cryotherapy;gait training;home exercise program;patient/family education;ROM (range of motion);strengthening;transfer training;progressive activity/exercise;home program guidelines   Anticipated Equipment Needs at Discharge (PT)   (pt has 4WWs and cane at home, has grab bar that attaches to tub)   Risk & Benefits of therapy have been explained evaluation/treatment results reviewed;risks/benefits reviewed;care plan/treatment goals reviewed;current/potential barriers reviewed;participants voiced agreement with care plan;participants included;patient   Clinical Impression Comments Patient moving fairly well for mobility in room but fatigues quickly. She will benefit from continued skilled PT in hospital to progress activity tolerance & endurance & independence with mobility toward baseline   PT Total Evaluation Time   PT Eval, Low Complexity Minutes (66578) 15   Physical Therapy Goals   PT Frequency 5x/week   PT Predicted Duration/Target Date for Goal Attainment 02/20/23   PT Goals Bed Mobility;Transfers;Gait;PT Goal 1   PT: Bed Mobility Independent  (flat bed per home set up)   PT: Gait Modified " independent;Rolling walker;150 feet   PT: Goal 1 Patient will demonstrate understanding of & independence in performance of HEP to progress LE strength & overall functional endurance   Interventions   Interventions Quick Adds Therapeutic Activity;Therapeutic Procedure   Therapeutic Procedure/Exercise   Ther. Procedure: strength, endurance, ROM, flexibillity Minutes (62986) 12   Symptoms Noted During/After Treatment fatigue;shortness of breath   Treatment Detail/Skilled Intervention Pt engaged in ambulation in room (1st few steps for eval, then treatment beginning) with walker ~25 ft with assist to manage O2 & IV pole. increased from 3.5 L at rest to 4L for ambulation, pt with increased leg pain and chest pressure/pain (worse with breathing) with ambulation. Spo2 89 post gait, quickly recovering to upper 90s. Checked BP pre/post, stable, see VSFS. pt declined further ambulation. After seated rest, performed 1 min standing with mini marching off & on during minute, with B UE support on walker. Back on 3.5 L O2 for this with SpO2>90.   Therapeutic Activity   Therapeutic Activities: dynamic activities to improve functional performance Minutes (56850) 12   Treatment Detail/Skilled Intervention Pt noted during eval to have insensate feet- edu pt on effect of this on balance & increased importance to use walker due to this- edu how walker can assist in feeling changes in surfaces when feet would not- pt voiced understanding. Also edu on importance of daily skin checks of feet- pt reports she does look at feet daily- edu on benefit to use mirror to improved ease to visualize entire foot as often difficult to see entire sole of foot otherwise, pt voiced understanding. Discussed discharge possibilities- edu that expect pt will progress in order for dc home; discussed that if pt were to not progress or declined then TCU may be considered- pt does not want TCU- edu then on importance of activity while hospitalized not only with  therapy but also with nursing & on her own to progress toward goals in order to go home. Edu on rec to get up to chair for meals & walk in room with nursing staff & spend extra time in standing for standing tolerance when getting up after using commode & edu on rec to perform seated LE movement EOB or in chair. Pt voiced understanding of general recs- will be good to follow up with more specific HEP. Discussed home PT, pt agreeable to this but pending progress may or may not be homebound at dc- discussed plan to establish HEP also.   PT Discharge Planning   PT Discharge Recommendation (DC Rec) home with assist;home with home care physical therapy   PT Rationale for DC Rec Anticipate pt will progress with mobility and endurance in order for discharge home with assist of daughter/family for grocery shopping/errands outside the home. At this time, recommend home PT follow up to progress functional endurance & strength to optimize mobility and independence. Currently pt would qualify for home PT due to signfiicant taxing effort to leave home. Pending progress, may be able to continue with HEP only if not considered home-bound at time of discharge.   PT Brief overview of current status Mod ind bed mobility and transfers/up to commod ind ok with nursing, SBA for ambulation with walker   Total Session Time   Timed Code Treatment Minutes 24   Total Session Time (sum of timed and untimed services) 39

## 2023-02-05 NOTE — PROGRESS NOTES
[facility]    Medicine Progress Note - Hospitalist Service, GOLD TEAM 7    Date of Admission:  2/3/2023    Assessment & Plan   Jenn Aparicio is a 67 year old female admitted on 2/3/2023. She has PMH of  Type 2 DM not on insulin c/b peripheral neuropathy, RLL adenocarcinoma s/p lobectomy in 2016, RUL NSCLC s/p SBRT (1/2020), HTN  COPD with chronic hypoxic respiratory failure requiring 3L at baseline who presents with worsening SOB with activity and admitted for possible COPD exacerbation      # Possible COPD exacerbation  # Acute on chronic hypoxic respiratory failure  Patient was recently admitted for COPD exacerbation and discharged. States she continues to have SOB and hypoxia with exertion despite 3L NC. Does have nonproductive cough. Did not have wheezing on my exam but was after IV steroids and nebs. My suspicion is patient likely just needs higher O2 with exertion and is not able to do the things she does due poor lung reserve.   - Reasonable to treat for COPD exacerbation with a taper  - Will switch Solumedrol to prednisone 60 mg for total of 5 days and taper over a few days   - Cont levofloxacin x 5 days   - Scheduled duonebs, PTA nebs  - Will consult COPD team   - Will need ambulatory O2 need  - PT/OT consult     # Lung adenocarcinoma, poorly diff adeno RLL  # S/P R thoracotomy, RLL lobectomy (2016) and radiation (2020)   Follows with Oncology at [facility] cancer clinic. Per chart the post radiation changes look fairly stable. Current CT scan without acute changes. Has follow up screening in coming 2 weeks.   - Onc consulted, no inpatient need and will follow-up with Dr. Leung on 3/1        #HTN  - Continue PTA amlodipine      #Type 2 Diabetes  # Peripheral neuropathy  Last A1c 9.4 on 1/23/23, baseline 8.3-9.4 over the last 1 year; so seems poorly controlled.   - Continue PTA empagliflozin, sliding scale insulin   - Follow-up as outpatient   - Continue PTA Lyrica  "for neuropathy      # GERD   #Dysphagia  -Continue PTA omeprazole     # Allergy  - Uses cetrizine at home, continue if needed        Diet: Combination Diet Regular Diet Adult    DVT Prophylaxis: Enoxaparin (Lovenox) SQ  Andrade Catheter: Not present  Lines: None     Cardiac Monitoring: None  Code Status: No CPR- Do NOT Intubate      Clinically Significant Risk Factors                       # DMII: A1C = 9.4 % (Ref range: <5.7 %) within past 3 months, PRESENT ON ADMISSION  # Obesity: Estimated body mass index is 32.7 kg/m  as calculated from the following:    Height as of this encounter: 1.676 m (5' 6\").    Weight as of this encounter: 91.9 kg (202 lb 9.6 oz)., PRESENT ON ADMISSION         Disposition Plan      Expected Discharge Date: 02/06/2023        Discharge Comments: SOB improved, oxygen need assessed. States her concentrator is not enough          Michael Logan MD  Hospitalist Service, [facility]  [facility]  Securely message with Voc[employer] (more info)  Text page via [facility] Paging/Directory   See signed in provider for up to date coverage information  ______________________________________________________________________    Interval History   No acute events overnight, states her O2 concentrator is not enough to have he be active, denies fever or chills     Physical Exam   Vital Signs: Temp: 98  F (36.7  C) Temp src: Oral BP: (!) 151/69 Pulse: 100   Resp: 18 SpO2: 96 % O2 Device: Nasal cannula Oxygen Delivery: 4 LPM (increased to 4L for ambulation)  Weight: 202 lbs 9.6 oz  Constitutional: Awake, alert, cooperative, no apparent distress  Respiratory: Clear to auscultation bilaterally  Cardiovascular: Regular rate and rhythm  GI: Normal bowel sounds, soft, non-distended, non-tender  Skin/Integumen: No rashes, no cyanosis      Medical Decision Making         Data     I have personally reviewed the following data over the past 24 hrs:    9.0  \   12.2   / 159     142 103 " 10.3 /  118 (H)   4.1 30 (H) 0.50 (L) \       ALT: 8 (L) AST: 15 AP: 81 TBILI: 0.2   ALB: 3.5 TOT PROTEIN: 5.9 (L) LIPASE: N/A       Trop: N/A BNP: N/A       Procal: N/A CRP: N/A Lactic Acid: N/A         Imaging results reviewed over the past 24 hrs:   No results found for this or any previous visit (from the past 24 hour(s)).

## 2023-02-06 ENCOUNTER — APPOINTMENT (OUTPATIENT)
Dept: PHYSICAL THERAPY | Facility: CLINIC | Age: 68
DRG: 190 | End: 2023-02-06
Payer: COMMERCIAL

## 2023-02-06 ENCOUNTER — APPOINTMENT (OUTPATIENT)
Dept: OCCUPATIONAL THERAPY | Facility: CLINIC | Age: 68
DRG: 190 | End: 2023-02-06
Payer: COMMERCIAL

## 2023-02-06 LAB
ALBUMIN SERPL BCG-MCNC: 3.6 G/DL (ref 3.5–5.2)
ALP SERPL-CCNC: 77 U/L (ref 35–104)
ALT SERPL W P-5'-P-CCNC: 9 U/L (ref 10–35)
ANION GAP SERPL CALCULATED.3IONS-SCNC: 9 MMOL/L (ref 7–15)
AST SERPL W P-5'-P-CCNC: 14 U/L (ref 10–35)
BASOPHILS # BLD AUTO: 0 10E3/UL (ref 0–0.2)
BASOPHILS NFR BLD AUTO: 0 %
BILIRUB SERPL-MCNC: <0.2 MG/DL
BUN SERPL-MCNC: 12.7 MG/DL (ref 8–23)
CALCIUM SERPL-MCNC: 8.9 MG/DL (ref 8.8–10.2)
CHLORIDE SERPL-SCNC: 103 MMOL/L (ref 98–107)
CREAT SERPL-MCNC: 0.52 MG/DL (ref 0.51–0.95)
DEPRECATED HCO3 PLAS-SCNC: 31 MMOL/L (ref 22–29)
EOSINOPHIL # BLD AUTO: 0 10E3/UL (ref 0–0.7)
EOSINOPHIL NFR BLD AUTO: 0 %
ERYTHROCYTE [DISTWIDTH] IN BLOOD BY AUTOMATED COUNT: 13.6 % (ref 10–15)
GFR SERPL CREATININE-BSD FRML MDRD: >90 ML/MIN/1.73M2
GLUCOSE BLDC GLUCOMTR-MCNC: 136 MG/DL (ref 70–99)
GLUCOSE BLDC GLUCOMTR-MCNC: 165 MG/DL (ref 70–99)
GLUCOSE BLDC GLUCOMTR-MCNC: 294 MG/DL (ref 70–99)
GLUCOSE BLDC GLUCOMTR-MCNC: 328 MG/DL (ref 70–99)
GLUCOSE BLDC GLUCOMTR-MCNC: 80 MG/DL (ref 70–99)
GLUCOSE SERPL-MCNC: 94 MG/DL (ref 70–99)
HCT VFR BLD AUTO: 40 % (ref 35–47)
HGB BLD-MCNC: 11.9 G/DL (ref 11.7–15.7)
IMM GRANULOCYTES # BLD: 0.1 10E3/UL
IMM GRANULOCYTES NFR BLD: 1 %
LYMPHOCYTES # BLD AUTO: 2.3 10E3/UL (ref 0.8–5.3)
LYMPHOCYTES NFR BLD AUTO: 25 %
MCH RBC QN AUTO: 27.5 PG (ref 26.5–33)
MCHC RBC AUTO-ENTMCNC: 29.8 G/DL (ref 31.5–36.5)
MCV RBC AUTO: 92 FL (ref 78–100)
MONOCYTES # BLD AUTO: 0.6 10E3/UL (ref 0–1.3)
MONOCYTES NFR BLD AUTO: 7 %
NEUTROPHILS # BLD AUTO: 6.2 10E3/UL (ref 1.6–8.3)
NEUTROPHILS NFR BLD AUTO: 67 %
NRBC # BLD AUTO: 0 10E3/UL
NRBC BLD AUTO-RTO: 0 /100
PLATELET # BLD AUTO: 166 10E3/UL (ref 150–450)
POTASSIUM SERPL-SCNC: 3.7 MMOL/L (ref 3.4–5.3)
PROT SERPL-MCNC: 5.9 G/DL (ref 6.4–8.3)
RBC # BLD AUTO: 4.33 10E6/UL (ref 3.8–5.2)
SODIUM SERPL-SCNC: 143 MMOL/L (ref 136–145)
WBC # BLD AUTO: 9.2 10E3/UL (ref 4–11)

## 2023-02-06 PROCEDURE — 250N000009 HC RX 250

## 2023-02-06 PROCEDURE — 250N000013 HC RX MED GY IP 250 OP 250 PS 637: Performed by: INTERNAL MEDICINE

## 2023-02-06 PROCEDURE — 250N000011 HC RX IP 250 OP 636

## 2023-02-06 PROCEDURE — 80053 COMPREHEN METABOLIC PANEL: CPT | Performed by: INTERNAL MEDICINE

## 2023-02-06 PROCEDURE — 97535 SELF CARE MNGMENT TRAINING: CPT | Mod: GO | Performed by: OCCUPATIONAL THERAPIST

## 2023-02-06 PROCEDURE — 272N000202 HC AEROBIKA WITH MANOMETER

## 2023-02-06 PROCEDURE — 94660 CPAP INITIATION&MGMT: CPT

## 2023-02-06 PROCEDURE — 99233 SBSQ HOSP IP/OBS HIGH 50: CPT | Performed by: INTERNAL MEDICINE

## 2023-02-06 PROCEDURE — 97530 THERAPEUTIC ACTIVITIES: CPT | Mod: GP

## 2023-02-06 PROCEDURE — 97110 THERAPEUTIC EXERCISES: CPT | Mod: GO | Performed by: OCCUPATIONAL THERAPIST

## 2023-02-06 PROCEDURE — 94640 AIRWAY INHALATION TREATMENT: CPT

## 2023-02-06 PROCEDURE — 94640 AIRWAY INHALATION TREATMENT: CPT | Mod: 76

## 2023-02-06 PROCEDURE — 94799 UNLISTED PULMONARY SVC/PX: CPT

## 2023-02-06 PROCEDURE — 250N000013 HC RX MED GY IP 250 OP 250 PS 637

## 2023-02-06 PROCEDURE — 36415 COLL VENOUS BLD VENIPUNCTURE: CPT | Performed by: INTERNAL MEDICINE

## 2023-02-06 PROCEDURE — 999N000033 HC STATISTIC CHRONIC PULMONARY DISEASE SPECIALIST

## 2023-02-06 PROCEDURE — 85025 COMPLETE CBC W/AUTO DIFF WBC: CPT | Performed by: INTERNAL MEDICINE

## 2023-02-06 PROCEDURE — 120N000002 HC R&B MED SURG/OB UMMC

## 2023-02-06 PROCEDURE — 250N000012 HC RX MED GY IP 250 OP 636 PS 637: Performed by: INTERNAL MEDICINE

## 2023-02-06 PROCEDURE — 999N000156 HC STATISTIC RCP CONSULT EA 30 MIN

## 2023-02-06 PROCEDURE — 250N000009 HC RX 250: Performed by: INTERNAL MEDICINE

## 2023-02-06 PROCEDURE — 97110 THERAPEUTIC EXERCISES: CPT | Mod: GP

## 2023-02-06 PROCEDURE — 999N000157 HC STATISTIC RCP TIME EA 10 MIN

## 2023-02-06 RX ORDER — ALBUTEROL SULFATE 0.83 MG/ML
2.5 SOLUTION RESPIRATORY (INHALATION) 4 TIMES DAILY
Status: DISCONTINUED | OUTPATIENT
Start: 2023-02-06 | End: 2023-02-09 | Stop reason: HOSPADM

## 2023-02-06 RX ORDER — ALBUTEROL SULFATE 0.83 MG/ML
2.5 SOLUTION RESPIRATORY (INHALATION)
Status: DISCONTINUED | OUTPATIENT
Start: 2023-02-06 | End: 2023-02-09 | Stop reason: HOSPADM

## 2023-02-06 RX ORDER — IPRATROPIUM BROMIDE AND ALBUTEROL SULFATE 2.5; .5 MG/3ML; MG/3ML
1 SOLUTION RESPIRATORY (INHALATION)
Status: DISCONTINUED | OUTPATIENT
Start: 2023-02-06 | End: 2023-02-06

## 2023-02-06 RX ORDER — NICOTINE 21 MG/24HR
1 PATCH, TRANSDERMAL 24 HOURS TRANSDERMAL DAILY
Status: DISCONTINUED | OUTPATIENT
Start: 2023-02-06 | End: 2023-02-06

## 2023-02-06 RX ORDER — SODIUM CHLORIDE FOR INHALATION 3 %
4 VIAL, NEBULIZER (ML) INHALATION 4 TIMES DAILY
Status: DISCONTINUED | OUTPATIENT
Start: 2023-02-06 | End: 2023-02-07

## 2023-02-06 RX ADMIN — ENOXAPARIN SODIUM 40 MG: 40 INJECTION SUBCUTANEOUS at 08:26

## 2023-02-06 RX ADMIN — GLIPIZIDE 5 MG: 5 TABLET ORAL at 17:14

## 2023-02-06 RX ADMIN — ALBUTEROL SULFATE 2.5 MG: 2.5 SOLUTION RESPIRATORY (INHALATION) at 19:05

## 2023-02-06 RX ADMIN — ACETAMINOPHEN 650 MG: 325 TABLET, FILM COATED ORAL at 21:19

## 2023-02-06 RX ADMIN — GUAIFENESIN 600 MG: 600 TABLET ORAL at 21:19

## 2023-02-06 RX ADMIN — PREGABALIN 150 MG: 100 CAPSULE ORAL at 08:21

## 2023-02-06 RX ADMIN — ACETAMINOPHEN 650 MG: 325 TABLET, FILM COATED ORAL at 08:35

## 2023-02-06 RX ADMIN — GUAIFENESIN 600 MG: 600 TABLET ORAL at 08:22

## 2023-02-06 RX ADMIN — PREDNISONE 60 MG: 20 TABLET ORAL at 08:21

## 2023-02-06 RX ADMIN — GLIPIZIDE 5 MG: 5 TABLET ORAL at 08:22

## 2023-02-06 RX ADMIN — SENNOSIDES AND DOCUSATE SODIUM 2 TABLET: 50; 8.6 TABLET ORAL at 08:35

## 2023-02-06 RX ADMIN — BENZONATATE 200 MG: 100 CAPSULE ORAL at 18:43

## 2023-02-06 RX ADMIN — ASPIRIN 81 MG: 81 TABLET ORAL at 08:22

## 2023-02-06 RX ADMIN — AMLODIPINE BESYLATE 10 MG: 10 TABLET ORAL at 08:21

## 2023-02-06 RX ADMIN — IPRATROPIUM BROMIDE AND ALBUTEROL SULFATE 3 ML: .5; 2.5 SOLUTION RESPIRATORY (INHALATION) at 11:46

## 2023-02-06 RX ADMIN — PREGABALIN 150 MG: 100 CAPSULE ORAL at 21:18

## 2023-02-06 RX ADMIN — FLUTICASONE PROPIONATE 1 SPRAY: 50 SPRAY, METERED NASAL at 08:19

## 2023-02-06 RX ADMIN — PANTOPRAZOLE SODIUM 40 MG: 40 TABLET, DELAYED RELEASE ORAL at 08:22

## 2023-02-06 RX ADMIN — ALBUTEROL SULFATE 2.5 MG: 2.5 SOLUTION RESPIRATORY (INHALATION) at 15:26

## 2023-02-06 RX ADMIN — SODIUM CHLORIDE SOLN NEBU 3% 4 ML: 3 NEBU SOLN at 19:05

## 2023-02-06 RX ADMIN — INSULIN ASPART 4 UNITS: 100 INJECTION, SOLUTION INTRAVENOUS; SUBCUTANEOUS at 17:16

## 2023-02-06 RX ADMIN — IPRATROPIUM BROMIDE AND ALBUTEROL SULFATE 3 ML: 2.5; .5 SOLUTION RESPIRATORY (INHALATION) at 08:51

## 2023-02-06 RX ADMIN — CETIRIZINE HYDROCHLORIDE 10 MG: 10 TABLET, FILM COATED ORAL at 08:21

## 2023-02-06 RX ADMIN — EMPAGLIFLOZIN 25 MG: 25 TABLET, FILM COATED ORAL at 08:22

## 2023-02-06 RX ADMIN — SODIUM CHLORIDE SOLN NEBU 3% 4 ML: 3 NEBU SOLN at 15:26

## 2023-02-06 RX ADMIN — UMECLIDINIUM 1 PUFF: 62.5 AEROSOL, POWDER ORAL at 08:17

## 2023-02-06 RX ADMIN — FLUTICASONE FUROATE AND VILANTEROL TRIFENATATE 1 PUFF: 200; 25 POWDER RESPIRATORY (INHALATION) at 08:17

## 2023-02-06 RX ADMIN — LEVOFLOXACIN 500 MG: 5 INJECTION, SOLUTION INTRAVENOUS at 05:53

## 2023-02-06 RX ADMIN — Medication 1 TABLET: at 08:21

## 2023-02-06 RX ADMIN — PANTOPRAZOLE SODIUM 40 MG: 40 TABLET, DELAYED RELEASE ORAL at 21:19

## 2023-02-06 ASSESSMENT — ACTIVITIES OF DAILY LIVING (ADL)
ADLS_ACUITY_SCORE: 34

## 2023-02-06 NOTE — PROGRESS NOTES
After discussion with COPD team it was decided to start pt on sodium chloride 3%, Albuterol, and volera treatments QID to help with mucus plugging seen on imaging. RT initiated first treatment with pt this afternoon and pt was able to tolerate CPEP of 15 and CHFO of 25 for a total time of 10min. Pt stated she felt that the treatment was working.     RT will continue to monitor and follow.    Fan Mccartney,RRT.

## 2023-02-06 NOTE — PLAN OF CARE
V/S & pain: hypertensive, neuropathic pain  Neuro: alert, orientedx4. Calm and cooperative  Respiratory: COPD at 3lpm nasal cannula at baseline  Skin: redness at right upper chest. Tingling and numbness on lower extremities  GI/: last bm 2/3/23, voids freely without difficulty with bedside commode  Nutrition: regular diet  Lines/drains: right PIV line  Activity:up independent,asked for assistance when wants to walk the hallway.     Events this shift: head of the bed elevated. Sleep in between cares..walks to the bath room independently. call light w/in reach and able to make needs known, will continue to monitor.    Plan: for continuity of care.  Goal Outcome Evaluation:      Plan of Care Reviewed With: patient

## 2023-02-06 NOTE — PROGRESS NOTES
[facility]    Medicine Progress Note - Hospitalist Service, GOLD TEAM 7    Date of Admission:  2/3/2023    Assessment & Plan   Jenn Aparicio is a 67 year old female admitted on 2/3/2023. She has PMH of  Type 2 DM not on insulin c/b peripheral neuropathy, RLL adenocarcinoma s/p lobectomy in 2016, RUL NSCLC s/p SBRT (1/2020), HTN  COPD with chronic hypoxic respiratory failure requiring 3L at baseline who presents with worsening SOB with activity and admitted for possible COPD exacerbation      Plan for today   - Case discussed with COPD team, mucous plug likely key issue   - Start intrapulm percussive therapy, hypertonic saline     # Mucous Plug  # Possible COPD exacerbation  # Acute on chronic hypoxic respiratory failure  Patient was recently admitted for COPD exacerbation and discharged. States she continues to have SOB and hypoxia with exertion despite 3L NC. Having nonproductive but severe cough. Did not have wheezing on my exam but was after IV steroids and nebs. My suspicion is patient's ongoing SOB likely due to noted mucous plug and need for higher O2 with exertion and is not able to do the things she does due poor lung reserve.   - Reasonable to treat for COPD exacerbation  - Switched Solumedrol to prednisone 60 mg for total of 5 days  - Cont levofloxacin x 5 days   - Discussed with COPD team, appreciate recs:    - Hypertonic saline with albuterol QID   - Intrapulmonary percussive therapy (aerobic not enough)  - Will need ambulatory O2 assessment per PT  - PT/OT consult     # Lung adenocarcinoma, poorly diff adeno RLL  # S/P R thoracotomy, RLL lobectomy (2016) and radiation (2020)   Follows with Oncology at [facility]. Per chart the post radiation changes look fairly stable. Current CT scan without acute changes. Has follow up screening in coming 2 weeks.   - Onc consulted, no inpatient need and will follow-up with Dr. Leung on 3/1        #HTN  -  "Continue PTA amlodipine      #Type 2 Diabetes  # Peripheral neuropathy  Last A1c 9.4 on 1/23/23, baseline 8.3-9.4 over the last 1 year; so seems poorly controlled.   - Continue PTA empagliflozin, sliding scale insulin   - Follow-up as outpatient   - Continue PTA Lyrica for neuropathy      # GERD   #Dysphagia  -Continue PTA omeprazole     # Allergy  - Uses cetrizine at home, continue if needed        Diet: Combination Diet Regular Diet Adult    DVT Prophylaxis: Enoxaparin (Lovenox) SQ  Andrade Catheter: Not present  Lines: None     Cardiac Monitoring: None  Code Status: No CPR- Do NOT Intubate      Clinically Significant Risk Factors                       # DMII: A1C = 9.4 % (Ref range: <5.7 %) within past 3 months, PRESENT ON ADMISSION  # Obesity: Estimated body mass index is 32.89 kg/m  as calculated from the following:    Height as of this encounter: 1.676 m (5' 6\").    Weight as of this encounter: 92.4 kg (203 lb 12.8 oz)., PRESENT ON ADMISSION         Disposition Plan      Expected Discharge Date: 02/08/2023        Discharge Comments: Dispo: Home w/ assist, HH. PT to assess O2 need with exertion. May need a new concentrator   Plan: Home care PT/OT referrals sent, Sunday          Michael Logan MD  Hospitalist Service, [address]  Securely message with [employer] (more info)  Text page via [facility] Paging/Directory   See signed in provider for up to date coverage information  ______________________________________________________________________    Interval History   No acute events overnight, continues to be SOB with exertion, requiring around 4 LPM    Physical Exam   Vital Signs: Temp: 98.5  F (36.9  C) Temp src: Oral BP: 136/60 Pulse: 109   Resp: 24 SpO2: 95 % O2 Device: Nasal cannula Oxygen Delivery: 4 LPM  Weight: 203 lbs 12.8 oz  Constitutional: Awake, alert, cooperative, no apparent distress  Respiratory: Clear to auscultation bilaterally  Cardiovascular: " Regular rate and rhythm  GI: Normal bowel sounds, soft, non-distended, non-tender  Skin/Integumen: No rashes, no cyanosis      Medical Decision Making         Data     I have personally reviewed the following data over the past 24 hrs:    9.2  \   11.9   / 166     143 103 12.7 /  136 (H)   3.7 31 (H) 0.52 \       ALT: 9 (L) AST: 14 AP: 77 TBILI: <0.2   ALB: 3.6 TOT PROTEIN: 5.9 (L) LIPASE: N/A       Imaging results reviewed over the past 24 hrs:   No results found for this or any previous visit (from the past 24 hour(s)).

## 2023-02-06 NOTE — PROGRESS NOTES
Patient has been assessed for Home Oxygen needs. Oxygen readings:    *Pulse oximetry (SpO2) = 93% on 3L/min at rest while awake.    *SpO2 improved to 95% on 4liters/minute at rest.    *SpO2 = 85% on 3L/min during activity/with exercise.    *SpO2 improved to 91-94% on 4liters/minute during activity/with exercise.

## 2023-02-06 NOTE — PLAN OF CARE
Cares from: [id number]     V/S & pain: VSS on 3L NC ex tachy, generalized pain managed w/ scheduled medications and prn tylenol x1  Neuro: A/O x4, calm and cooperative   Respiratory: stable on 3L NC- per pt baseline, continuous pulse ox monitoring to the desk, coarse crackles/diminished lung sounds, infrequent non-productive cough - prn tessalon given   Skin: no new skin concerns present  GI/: up ad betsey to bedside commode, voiding spontaneously w/ good UO, no BM   Nutrition: regular diet w/ good appetite and adequate po intake, BG monitoring    Lines/drains: R PIV SL   Activity:  up Ax1/SBA for OOB activity ex up ad betsey to bedside commode  Labs: no RN managed labs, BG monitoring      Events this shift: no acute events this shift. A/O x4. Pt worked w/ PT/OT today. Pt coughing less today and cough is non-productive, prn tessalon given pt reporting good effect. Last dose of IV steroids given, w/ the plan to start on PO tomorrow. BG monitoring stable throughout the shift. Pt up ad betsey to bedside commode w/ good uo but needing Ax1/SBA for other activity. call light w/in reach and able to make needs known, will continue to monitor.     Plan: continue w/ poc     Goal Outcome Evaluation:      Plan of Care Reviewed With: patient    Overall Patient Progress: improvingOverall Patient Progress: improving    Outcome Evaluation: PT/OT eval completed, respiratory status, bg monitoring

## 2023-02-06 NOTE — PROGRESS NOTES
Chronic Pulmonary Disease Specialist Progress note    Consult received for COPD education. Jenn is well known to this writer. Writer has been following Jenn since 2021. Saw Jenn today for re-education. Jenn is too short of breath today for inspiratory flow measurements. These measurements are needed in order to make recommendations for medication changes.     During review of Jenn's  noted that patient has some mucous plugging present on her CT scan from 2/3/2023. Worked with Jenn today with the Aerobika flutter valve. Jenn is unable to produce secretions with the use of the Aerobika. The device does make Jenn cough; however, the cough is harsh and extremely congested which is causing Jenn to experience SOB after each breath with the Aerobika.     Jenn was started on 3% hypertonic saline nebs after her Pulmonary visit on 4/20/2022. Jenn stated that she didn't know anything about these nebs and never received a prescription for it. Writer reached out to the IP Pulmonary fellow to find out which would be more beneficial for patient, 3% saline nebs or Mucomyst nebs, to address her mucous plugging. Fellow suggested 3% nebs to start and if not effective can change to Mucomyst. Volara intra pulmonary percussive therapy has been added to her inpatient treatment regimen as well.    Jenn will require a new walk test for oxygen needs at home as [facility] provided a device that only provides 3 LPM. This gives Jenn no option to increase her oxygen if she is experiencing more dyspnea or respiratory distress. Spoke to Marisol at [facility] who stated that Jenn could get a oxygen concentrator with a fill system and tanks for portability.     Writer will see patient again tomorrow to complete full session and place consult note at that time. Contacted hospitalist with the above information. Contacted [facility] regarding the oxygen changes for the patient. Will reach out again tomorrow with further recommendations after  writer finishes session with patient.    Nayana Gonzalez, SILVESTRE, RRT, CTTS  Chronic Pulmonary Disease Specialist  Office: [phone]   Pager: [phone]

## 2023-02-06 NOTE — PLAN OF CARE
RN assumed cares at 1900 to 2300     Vitals: Temp: 98.7 via oral src, resp: 20, pulse: 107, BP: 141/67, O2 95% at 3L via NC  Pain: neuropathy pain reported  Neuro: A/O  Cardiac: Tachycardic, on Tele.   Respiratory: Abnormal breath sounds, cough, 3L O2 at baseline   GI/: WDL  IV/Drains: 1 PIV saline locked  Activity: independent to bedside commode, SBA with walker to ambulate out of room   Skin: no blanchable red areas. Lower extremity edema.   Labs: BG checks ACHS and 0200     Plan of Care:  Monitor O2, tele, and BG. Continue to monitor and contact MD as needed

## 2023-02-06 NOTE — PLAN OF CARE
"/61 (BP Location: Right arm)   Pulse 92   Temp 98.2  F (36.8  C) (Oral)   Resp 24   Ht 1.676 m (5' 6\")   Wt 92.4 kg (203 lb 12.8 oz)   SpO2 96%   BMI 32.89 kg/m      Care from: 0700 - 1930      VS & Pain: VSS. Endorsing 10/10 pain in chest due to coughing. Received prn tylenol with minimal relief.     Neuro: A&Ox4.    Respiratory: Continues on 3L NC with frequent congested, non-productive cough.    Cardiac: Intermittently tachycardic.    Peripheral neurovascular: Numbness/tingling in all extremeties at baseline.    GI/: Last BM 2/3. Given 2 tab prn senna.    Nutrition: Regular diet. Good appetite and intake.    Skin: No new skin concerns this shift.    Lines: R piv SL.    Activity: Stand-by assist.    Events this shift: Call light within reach and able to make needs known.    Plan: Continue to follow plan of care.    Goal Outcome Evaluation:      Plan of Care Reviewed With: patient    Overall Patient Progress: no changeOverall Patient Progress: no change    Outcome Evaluation: Continues on 3L O2 with frequent cough.      "

## 2023-02-07 ENCOUNTER — APPOINTMENT (OUTPATIENT)
Dept: PHYSICAL THERAPY | Facility: CLINIC | Age: 68
DRG: 190 | End: 2023-02-07
Payer: COMMERCIAL

## 2023-02-07 ENCOUNTER — APPOINTMENT (OUTPATIENT)
Dept: OCCUPATIONAL THERAPY | Facility: CLINIC | Age: 68
DRG: 190 | End: 2023-02-07
Payer: COMMERCIAL

## 2023-02-07 LAB
ALBUMIN SERPL BCG-MCNC: 3.6 G/DL (ref 3.5–5.2)
ALP SERPL-CCNC: 73 U/L (ref 35–104)
ALT SERPL W P-5'-P-CCNC: 11 U/L (ref 10–35)
ANION GAP SERPL CALCULATED.3IONS-SCNC: 9 MMOL/L (ref 7–15)
AST SERPL W P-5'-P-CCNC: 15 U/L (ref 10–35)
BASOPHILS # BLD AUTO: 0 10E3/UL (ref 0–0.2)
BASOPHILS NFR BLD AUTO: 0 %
BILIRUB SERPL-MCNC: 0.2 MG/DL
BUN SERPL-MCNC: 12.9 MG/DL (ref 8–23)
CALCIUM SERPL-MCNC: 8.4 MG/DL (ref 8.8–10.2)
CHLORIDE SERPL-SCNC: 103 MMOL/L (ref 98–107)
CREAT SERPL-MCNC: 0.54 MG/DL (ref 0.51–0.95)
DEPRECATED HCO3 PLAS-SCNC: 31 MMOL/L (ref 22–29)
EOSINOPHIL # BLD AUTO: 0 10E3/UL (ref 0–0.7)
EOSINOPHIL NFR BLD AUTO: 1 %
ERYTHROCYTE [DISTWIDTH] IN BLOOD BY AUTOMATED COUNT: 13.8 % (ref 10–15)
GFR SERPL CREATININE-BSD FRML MDRD: >90 ML/MIN/1.73M2
GLUCOSE BLDC GLUCOMTR-MCNC: 151 MG/DL (ref 70–99)
GLUCOSE BLDC GLUCOMTR-MCNC: 275 MG/DL (ref 70–99)
GLUCOSE BLDC GLUCOMTR-MCNC: 291 MG/DL (ref 70–99)
GLUCOSE BLDC GLUCOMTR-MCNC: 89 MG/DL (ref 70–99)
GLUCOSE SERPL-MCNC: 94 MG/DL (ref 70–99)
HCT VFR BLD AUTO: 40 % (ref 35–47)
HGB BLD-MCNC: 12.2 G/DL (ref 11.7–15.7)
IMM GRANULOCYTES # BLD: 0.1 10E3/UL
IMM GRANULOCYTES NFR BLD: 1 %
LYMPHOCYTES # BLD AUTO: 3.1 10E3/UL (ref 0.8–5.3)
LYMPHOCYTES NFR BLD AUTO: 35 %
MCH RBC QN AUTO: 27.8 PG (ref 26.5–33)
MCHC RBC AUTO-ENTMCNC: 30.5 G/DL (ref 31.5–36.5)
MCV RBC AUTO: 91 FL (ref 78–100)
MONOCYTES # BLD AUTO: 0.7 10E3/UL (ref 0–1.3)
MONOCYTES NFR BLD AUTO: 7 %
NEUTROPHILS # BLD AUTO: 4.9 10E3/UL (ref 1.6–8.3)
NEUTROPHILS NFR BLD AUTO: 56 %
NRBC # BLD AUTO: 0 10E3/UL
NRBC BLD AUTO-RTO: 0 /100
PLATELET # BLD AUTO: 155 10E3/UL (ref 150–450)
POTASSIUM SERPL-SCNC: 3.4 MMOL/L (ref 3.4–5.3)
PROT SERPL-MCNC: 5.7 G/DL (ref 6.4–8.3)
RBC # BLD AUTO: 4.39 10E6/UL (ref 3.8–5.2)
SODIUM SERPL-SCNC: 143 MMOL/L (ref 136–145)
WBC # BLD AUTO: 8.8 10E3/UL (ref 4–11)

## 2023-02-07 PROCEDURE — 120N000002 HC R&B MED SURG/OB UMMC

## 2023-02-07 PROCEDURE — 94640 AIRWAY INHALATION TREATMENT: CPT | Mod: 76

## 2023-02-07 PROCEDURE — 97535 SELF CARE MNGMENT TRAINING: CPT | Mod: GO

## 2023-02-07 PROCEDURE — 80053 COMPREHEN METABOLIC PANEL: CPT | Performed by: INTERNAL MEDICINE

## 2023-02-07 PROCEDURE — 250N000012 HC RX MED GY IP 250 OP 636 PS 637: Performed by: INTERNAL MEDICINE

## 2023-02-07 PROCEDURE — 250N000013 HC RX MED GY IP 250 OP 250 PS 637: Performed by: INTERNAL MEDICINE

## 2023-02-07 PROCEDURE — 36415 COLL VENOUS BLD VENIPUNCTURE: CPT | Performed by: INTERNAL MEDICINE

## 2023-02-07 PROCEDURE — 85025 COMPLETE CBC W/AUTO DIFF WBC: CPT | Performed by: INTERNAL MEDICINE

## 2023-02-07 PROCEDURE — 250N000011 HC RX IP 250 OP 636

## 2023-02-07 PROCEDURE — 94660 CPAP INITIATION&MGMT: CPT

## 2023-02-07 PROCEDURE — 250N000009 HC RX 250: Performed by: STUDENT IN AN ORGANIZED HEALTH CARE EDUCATION/TRAINING PROGRAM

## 2023-02-07 PROCEDURE — 94799 UNLISTED PULMONARY SVC/PX: CPT

## 2023-02-07 PROCEDURE — 250N000009 HC RX 250: Performed by: INTERNAL MEDICINE

## 2023-02-07 PROCEDURE — 94640 AIRWAY INHALATION TREATMENT: CPT

## 2023-02-07 PROCEDURE — 97110 THERAPEUTIC EXERCISES: CPT | Mod: GP

## 2023-02-07 PROCEDURE — 99233 SBSQ HOSP IP/OBS HIGH 50: CPT | Performed by: STUDENT IN AN ORGANIZED HEALTH CARE EDUCATION/TRAINING PROGRAM

## 2023-02-07 PROCEDURE — 999N000157 HC STATISTIC RCP TIME EA 10 MIN

## 2023-02-07 PROCEDURE — 250N000013 HC RX MED GY IP 250 OP 250 PS 637: Performed by: STUDENT IN AN ORGANIZED HEALTH CARE EDUCATION/TRAINING PROGRAM

## 2023-02-07 PROCEDURE — 250N000013 HC RX MED GY IP 250 OP 250 PS 637

## 2023-02-07 PROCEDURE — 97110 THERAPEUTIC EXERCISES: CPT | Mod: GO

## 2023-02-07 RX ORDER — ACETYLCYSTEINE 100 MG/ML
4 SOLUTION ORAL; RESPIRATORY (INHALATION) 4 TIMES DAILY
Status: DISCONTINUED | OUTPATIENT
Start: 2023-02-07 | End: 2023-02-09 | Stop reason: HOSPADM

## 2023-02-07 RX ORDER — LIDOCAINE 4 G/G
1 PATCH TOPICAL
Status: DISCONTINUED | OUTPATIENT
Start: 2023-02-07 | End: 2023-02-09 | Stop reason: HOSPADM

## 2023-02-07 RX ORDER — ACETYLCYSTEINE 100 MG/ML
4 SOLUTION ORAL; RESPIRATORY (INHALATION) 4 TIMES DAILY
Qty: 480 ML | Refills: 0 | Status: ON HOLD | OUTPATIENT
Start: 2023-02-07 | End: 2023-02-24

## 2023-02-07 RX ORDER — ACETYLCYSTEINE 100 MG/ML
4 SOLUTION ORAL; RESPIRATORY (INHALATION)
Status: DISCONTINUED | OUTPATIENT
Start: 2023-02-07 | End: 2023-02-07

## 2023-02-07 RX ORDER — NICOTINE 21 MG/24HR
1 PATCH, TRANSDERMAL 24 HOURS TRANSDERMAL DAILY
Qty: 30 PATCH | Refills: 0 | Status: SHIPPED | OUTPATIENT
Start: 2023-02-08 | End: 2023-03-10

## 2023-02-07 RX ADMIN — LEVOFLOXACIN 500 MG: 5 INJECTION, SOLUTION INTRAVENOUS at 06:05

## 2023-02-07 RX ADMIN — INSULIN ASPART 3 UNITS: 100 INJECTION, SOLUTION INTRAVENOUS; SUBCUTANEOUS at 16:22

## 2023-02-07 RX ADMIN — ACETYLCYSTEINE 4 ML: 100 SOLUTION ORAL; RESPIRATORY (INHALATION) at 12:45

## 2023-02-07 RX ADMIN — GLIPIZIDE 5 MG: 5 TABLET ORAL at 08:26

## 2023-02-07 RX ADMIN — ACETYLCYSTEINE 4 ML: 100 SOLUTION ORAL; RESPIRATORY (INHALATION) at 17:03

## 2023-02-07 RX ADMIN — FLUTICASONE FUROATE AND VILANTEROL TRIFENATATE 1 PUFF: 200; 25 POWDER RESPIRATORY (INHALATION) at 08:29

## 2023-02-07 RX ADMIN — ALBUTEROL SULFATE 2.5 MG: 2.5 SOLUTION RESPIRATORY (INHALATION) at 07:39

## 2023-02-07 RX ADMIN — EMPAGLIFLOZIN 25 MG: 25 TABLET, FILM COATED ORAL at 08:36

## 2023-02-07 RX ADMIN — ENOXAPARIN SODIUM 40 MG: 40 INJECTION SUBCUTANEOUS at 08:28

## 2023-02-07 RX ADMIN — GLIPIZIDE 5 MG: 5 TABLET ORAL at 16:06

## 2023-02-07 RX ADMIN — ASPIRIN 81 MG: 81 TABLET ORAL at 08:27

## 2023-02-07 RX ADMIN — GUAIFENESIN 600 MG: 600 TABLET ORAL at 08:27

## 2023-02-07 RX ADMIN — ACETYLCYSTEINE 4 ML: 100 SOLUTION ORAL; RESPIRATORY (INHALATION) at 20:49

## 2023-02-07 RX ADMIN — PREDNISONE 60 MG: 20 TABLET ORAL at 08:28

## 2023-02-07 RX ADMIN — GUAIFENESIN 600 MG: 600 TABLET ORAL at 19:52

## 2023-02-07 RX ADMIN — PANTOPRAZOLE SODIUM 40 MG: 40 TABLET, DELAYED RELEASE ORAL at 19:52

## 2023-02-07 RX ADMIN — PREGABALIN 150 MG: 100 CAPSULE ORAL at 08:27

## 2023-02-07 RX ADMIN — SODIUM CHLORIDE SOLN NEBU 3% 4 ML: 3 NEBU SOLN at 07:39

## 2023-02-07 RX ADMIN — INSULIN ASPART 1 UNITS: 100 INJECTION, SOLUTION INTRAVENOUS; SUBCUTANEOUS at 11:54

## 2023-02-07 RX ADMIN — Medication 1 TABLET: at 08:26

## 2023-02-07 RX ADMIN — FLUTICASONE PROPIONATE 1 SPRAY: 50 SPRAY, METERED NASAL at 08:28

## 2023-02-07 RX ADMIN — PANTOPRAZOLE SODIUM 40 MG: 40 TABLET, DELAYED RELEASE ORAL at 08:28

## 2023-02-07 RX ADMIN — NICOTINE 1 PATCH: 14 PATCH, EXTENDED RELEASE TRANSDERMAL at 08:29

## 2023-02-07 RX ADMIN — PREGABALIN 150 MG: 100 CAPSULE ORAL at 19:52

## 2023-02-07 RX ADMIN — ALBUTEROL SULFATE 2.5 MG: 2.5 SOLUTION RESPIRATORY (INHALATION) at 12:45

## 2023-02-07 RX ADMIN — ALBUTEROL SULFATE 2.5 MG: 2.5 SOLUTION RESPIRATORY (INHALATION) at 20:51

## 2023-02-07 RX ADMIN — UMECLIDINIUM 1 PUFF: 62.5 AEROSOL, POWDER ORAL at 08:29

## 2023-02-07 RX ADMIN — ALBUTEROL SULFATE 2.5 MG: 2.5 SOLUTION RESPIRATORY (INHALATION) at 17:03

## 2023-02-07 RX ADMIN — LIDOCAINE PATCH 4% 1 PATCH: 40 PATCH TOPICAL at 19:52

## 2023-02-07 RX ADMIN — BENZONATATE 200 MG: 100 CAPSULE ORAL at 08:40

## 2023-02-07 RX ADMIN — AMLODIPINE BESYLATE 10 MG: 10 TABLET ORAL at 08:28

## 2023-02-07 RX ADMIN — CETIRIZINE HYDROCHLORIDE 10 MG: 10 TABLET, FILM COATED ORAL at 08:26

## 2023-02-07 ASSESSMENT — ACTIVITIES OF DAILY LIVING (ADL)
ADLS_ACUITY_SCORE: 34
DEPENDENT_IADLS:: INDEPENDENT

## 2023-02-07 NOTE — PLAN OF CARE
Goal Outcome Evaluation:    Shift: 0100 - 2630      Plan of Care Reviewed With: patient    Overall Patient Progress: no change    Outcome Evaluation : &Ox4, VSS and continues on 3L of O2 with frequent cough. Pt reports chest pain from coughing and pain medication was administered with little relief. Last BM was 2/6, loose stool. Voiding with no difficulties and up independently. Right PIV infusing levofloxacin. Yesterday BG was 294.

## 2023-02-07 NOTE — CONSULTS
"Chronic Pulmonary Disease Specialist Consult   COPD Initial Interview    2023    Patient: Jenn Aparicio      :  1955                    MRN:[id number]      Reason for Consult:  Consulted to provide COPD education and optimize treatment regimen. Patient with very severe COPD being followed by COPD Readmission Reduction Program    History of Present Illness: Jenn Aparicio is a 67 year old female admitted on 2/3/2023. She has a history of COPD with chronic hypoxic respiratory failure requiring 3L at baseline who presents with COPD exacerbation and admitted for In Patient management. Patient reports continued easy desaturation with exertion, increased cough and chest pain over the last several days. She feels this exacerbation is a little worse than before due to associated chest pain, and it takes her longer to recover after desaturation.    Smoking Hx:  Current everyday smoker. Jenn smokes 3-4 cigarettes a day. She is trying to quit. She has used the 14 mg patch and the Nicotrol inhaler previously. Writer has seen the patient for smoking cessation counseling during her previous four admissions.                 Pulmonologist/Last office visit  Dr. Kailee Moralez----Last visit was 2022----Patient was to return in three months(2022) for a follow up visit.    Jenn's CT scan during this admission shows areas of mucous plugging. She has a very congested harsh cough that is not productive of any sputum. Patient stated, \"I feel like I have a big ball just sitting there.\" Jenn had been using her Aerobika for airway clearance for two years. Since the Aerobika has failed to provide adequate airway clearance for Jenn she will benefit from the use of Vest therapy at home.    Recommendations      Will need follow up appointment with Pulmonologist and complete PFT's.    14 mg Nicotine Patch to help reduce symptoms of withdrawal and cravings (Provide prescription at discharge for patient)    4mg Nicotine " Lozenges for cravings and urges(Provide prescription at discharge for patient)    Provide prescription at discharge for Nicotrol inhaler    Referral for outpatient pulmonary Rehab    Referral for OP sleep consult to assess for sleep disordered breathing, DAVID, nocturnal hypoxia, and hypoventilation    At discharge continue with patient's home regimen of respiratory medications with the addition of Mucomyst nebs QID.  Most recent  PFT/interpretation on:  12/15/2020               FVC          1.32 LPM    44%   FEV1          0.57 LPM    24%   FEV1/FVC   (Ratio)                                                                                      44%   DLCO                              38%   Interpretation Very severe airflow obstruction with significant bronchodilator response. Severe diffusion defect.       Sleep Studies: Patient had a home sleep study in 2021 and was recommended to have a study in the sleep lab. She had an appointment scheduled for this study but was ill.  She has not rescheduled this yet.     Home Oxygen Use: Patient was on 2 L oxygen at night prior to her admission in January. She was discharged with 3 L continuously after her January admission. She had another walk test during this admission and she now requires 3L at rest and 4 L with activity. New orders along with qualifying paperwork will need to be faxed to [facility]. Patient will require a concentrator with a fill system and tanks for portability.          Pulmonary Rehab History: none----Patient is interested in pursuing this.       Home respiratory medications include:      Trelegy Ellipta daily  (Patient was reminded to rinse/spit after using)  Duonebs Q 6 prn  Albuterol MDI Q 4 prn    Assessment:   Jenn was found resting in bed and on 3 L oxygen with saturation of 95%. Jenn appeared comfortable with slight SOB which increased as she talked more. Pursued lip breathing was reviewed with patient. BS were diminished and coarse.       Action:          Evaluated patients inspiratory flow using In-Check device:     --Low resistance setting: Patient able to generate 40 LPM, which is sufficient inspiratory flow for her Albuterol rescue inhaler. Albuterol inhalers require a slow inhalation of 20-60 LPM for optimal drug deposition with 5-10 second breath hold.      --Medium resistance setting: Patient able to generate 45 LPM, which is sufficient inspiratory flow for her Trelegy Ellipta DPI. Medium resistance inhalers require a fast and deep inhalation of 30-80LPM with 5-10 second breath hold.     --Evaluated patients' coordination and technique with inhaler: Patient demonstrated good technique with all of her inhalers. Educated patient on proper inspiratory flows needed for all her inhalers. Provided and instructed patient on proper use of Aerochamber spacer with inhaler; reiterated that spacer should always be used with her rescue inhaler.       -Patient able to generate a pressure of 5-10 cm H2O on Aerobika OPEP device for 2 seconds generating good strong non-productive cough. The goal for each breath, for a total of 3 sets of 10 breaths, is to exhale at a of pressure 10-20 for 3-4 seconds without fatigue. Educated patient to perform 2 to 3 'marcelino coughs'  to clear airway after each set. Shared that consistent use of this device will help open smaller airways, improve mucus clearance, decrease cough frequency, and improve exercise tolerance.     Recommendations:    Continue with current inpatient respiratory medication schedule.     Use Aerobika Oscillating PEP Device for 3 sets of 10 breaths two times daily as directed above    Smoking Cessation Counseling and Relapse Prevention Consult (Done)    Consider CT chest for lung cancer screening given smoking history    Medication Therapy Management Referral as outpatient to reinforce patient on the proper use of inhalers, nebulizer's, and other home medications    PT/OT consult to perform cognitive evaluation, assess  "patients functional abilities, limitations, home care needs, and make recommendations for safe transition to home or TCU.    Home Oxygen Assessment Testing 24-48 hours prior to discharge to determine O2 needs at rest and with exertion/activity. If the patient requires oxygen at rest, the walk test must be performed using the new baseline O2 to determine if patient needs more oxygen with activity.     In the event patient qualifies for oxygen, at rest or with activity, please use the following verbiage in oxygen order: \"Please provide portable oxygen concentrator AND please test for oxygen conserving device using ____LPM to maintain sats between ____)    Patient is a good candidate for COPD Action Plan due to high readmission risk    Will continue to follow and support patient as needed. Will follow up with phone call 48 hours after discharge.     I spent 90 minutes with the patient.    SILVESTRE Mistry, RRT, CTTS  Chronic Pulmonary Disease Specialist  Office: [phone]   Pager: [phone]             "

## 2023-02-07 NOTE — CONSULTS
Care Management Initial Consult     General Information  Assessment completed with: Patient   Type of CM/SW Visit: Initial Assessment  Primary Care Provider verified and updated as needed: No   Readmission within the last 30 days: no previous admission in last 30 days   Reason for Consult: discharge planning  Advance Care Planning: denies     Communication Assessment  Patient's communication style: spoken language (English or Bilingual)    Hearing Difficulty or Deaf: no   Wear Glasses or Blind: yes     Cognitive  Cognitive/Neuro/Behavioral: WDL       Living Environment:   People in home: alone     Current living Arrangements: apartment      Able to return to prior arrangements: yes     Family/Social Support:  Care provided by: self  Provides care for: no one  Marital Status: Single  Support System: Children          Description of Support System: Supportive, Involved       Current Resources:   Patient receiving home care services: No  Community Resources: DME, [place] Programs, [place] Worker  Equipment currently used at home: cane, straight, grab bar, tub/shower, walker, standard  Supplies currently used at home: Oxygen Tubing/Supplies, Nebulizer tubing     Employment/Financial:  Employment Status: disabled        Functional Status:  Prior to admission patient needed assistance: Independent w/all ADLs.      Mental Health Status:  Mental Health Status: No Current Concerns        Chemical Dependency Status:  Chemical Dependency Status: No Current Concerns           Additional Information:  Care management assessment completed primarily via chart review, given recent prior admission and admission Dx. Pt also has ongoing bedside consults with RT, Research, and COPD team(s). Patient lives at listed address, alone; receives support via county worker, PCA, and [employer] worker services. Patient baseline @ 3L O2, services through [facility], c/o being dissatisfied with current concentrator at home; new walk test and equipment indicated,  per Candy BURNETTE in-person rounding on 2/6. Listed PCP not current, unable to specify name but primary services received at [facility]. Frequent DME use includes walker,cane, and  bathroom grab bars. Patient has good familial support (9 children), her daughter Maureen, noted as an initial support for discharge transportation. RNCC to continue to follow pt and support discharge planning and obtaining appropriate upgrades in DME/Home O2 needs.      [facility]   Phone: [phone]    Randal Garcia RN BSN  5B RNCC  Phone: [phone]  Pager: [phone]    For Weekend & Holiday on call RN Care Coordinator:  (Tasks: Home care, home infusion, medical equipment, transportation, IMM & BENJAMIN forms, etc.)  [place] (0800 - 1630) Saturday and Sunday    Units: 4A, 4C, 4E, 5A and 5B: [phone]    Units: 6A, 6B, 6C, 6D: [phone]    Units: 7A, 7B, 7C, 7D, and 5C: [phone]    [facility] ([phone]) Saturday and Sunday    Units: 5 Ortho, 8A, 10 ICU, & Pediatric Units: [phone]

## 2023-02-07 NOTE — PROGRESS NOTES
"[facility]    Medicine Progress Note - Hospitalist Service, GOLD TEAM 7    Date of Admission:  2/3/2023    Assessment & Plan   Jenn Aparicio is a 67 year old female admitted on 2/3/2023. She has PMH of  Type 2 DM not on insulin c/b peripheral neuropathy, RLL adenocarcinoma s/p lobectomy in 2016, RUL NSCLC s/p SBRT (1/2020), HTN  COPD with chronic hypoxic respiratory failure requiring 3L at baseline who presents with worsening SOB with activity and admitted for possible COPD exacerbation      Vest therapy documentation  Jenn's CT scan during this admission shows areas of mucous plugging. She has a very congested harsh cough that is not productive of any sputum. Patient stated, \"I feel like I have a big ball just sitting there.\" Jenn had been using her Aerobika for airway clearance for two years. Since the Aerobika has failed to provide adequate airway clearance for Jenn she will benefit from the use of Vest therapy at home.     Recommendations       Will need follow up appointment with Pulmonologist and complete PFT's.    14 mg Nicotine Patch to help reduce symptoms of withdrawal and cravings (Provide prescription at discharge for patient)    4mg Nicotine Lozenges for cravings and urges(Provide prescription at discharge for patient)    Provide prescription at discharge for Nicotrol inhaler    Referral for outpatient pulmonary Rehab    Referral for OP sleep consult to assess for sleep disordered breathing, DAVID, nocturnal hypoxia, and hypoventilation    At discharge continue with patient's home regimen of respiratory medications with the addition of Mucomyst nebs QID.    Oxygen Documentation  I certify that this patient, Jenn Aparicio has been under my care (or a nurse practitioner or physican's assistant working with me). This is the face-to-face encounter for oxygen medical necessity.      At the time of this encounter supplemental oxygen is reasonable and necessary and is " expected to improve the patient's condition in a home setting.       Patient has continued oxygen desaturation due to COPD J44.9.    If portability is ordered, is the patient mobile within the home? yes        # Mucous Plug  # Possible COPD exacerbation  # Acute on chronic hypoxic respiratory failure  Patient was recently admitted for COPD exacerbation and discharged. States she continues to have SOB and hypoxia with exertion despite 3L NC. Having nonproductive but severe cough. Did not have wheezing on my exam but was after IV steroids and nebs. My suspicion is patient's ongoing SOB likely due to noted mucous plug and need for higher O2 with exertion and is not able to do the things she does due poor lung reserve.   - Reasonable to treat for COPD exacerbation  - Switched Solumedrol to prednisone 60 mg for total of 5 days  - Cont levofloxacin x 5 days   - Discussed with COPD team, appreciate recs:    - Hypertonic saline with albuterol QID   - Intrapulmonary percussive therapy (aerobic not enough)  - Will need ambulatory O2 assessment per PT  - PT/OT consult     # Lung adenocarcinoma, poorly diff adeno RLL  # S/P R thoracotomy, RLL lobectomy (2016) and radiation (2020)   Follows with Oncology at [facility]. Per chart the post radiation changes look fairly stable. Current CT scan without acute changes. Has follow up screening in coming 2 weeks.   - Onc consulted, no inpatient need and will follow-up with Dr. Leung on 3/1        #HTN  - Continue PTA amlodipine      #Type 2 Diabetes  # Peripheral neuropathy  Last A1c 9.4 on 1/23/23, baseline 8.3-9.4 over the last 1 year; so seems poorly controlled.   - Continue PTA empagliflozin, sliding scale insulin   - Follow-up as outpatient   - Continue PTA Lyrica for neuropathy      # GERD   #Dysphagia  -Continue PTA omeprazole     # Allergy  - Uses cetrizine at home, continue if needed        Diet: Combination Diet Regular Diet Adult    DVT Prophylaxis: Enoxaparin  "(Lovenox) SQ  Andrade Catheter: Not present  Lines: None     Cardiac Monitoring: None  Code Status: No CPR- Do NOT Intubate      Clinically Significant Risk Factors          # Hypocalcemia: Lowest Ca = 8.4 mg/dL in last 2 days, will monitor and replace as appropriate              # DMII: A1C = 9.4 % (Ref range: <5.7 %) within past 3 months, PRESENT ON ADMISSION  # Obesity: Estimated body mass index is 33.01 kg/m  as calculated from the following:    Height as of this encounter: 1.676 m (5' 6\").    Weight as of this encounter: 92.8 kg (204 lb 8 oz)., PRESENT ON ADMISSION         Disposition Plan      Expected Discharge Date: 02/08/2023        Discharge Comments: Dispo: Home w/ assist, HH-PT/OT. PT to assess O2 need with exertion.   Progress: HH referrals sent, Sunday  Plan: Needs new prescription, walk test, and MD F2F note for O2          Edy Nash MD  Hospitalist Service, [address]  Securely message with [employer] (more info)  Text page via [facility] Paging/Directory   See signed in provider for up to date coverage information  ______________________________________________________________________    Interval History   No acute events overnight, continues to be SOB with exertion, requiring around 4 LPM with exertion. Walk test done with PT yesterday.     Physical Exam   Vital Signs: Temp: 98  F (36.7  C) Temp src: Oral BP: (!) 142/73 Pulse: 93   Resp: 22 SpO2: 97 % O2 Device: Nasal cannula Oxygen Delivery: 3 LPM  Weight: 204 lbs 8 oz  Constitutional: Awake, alert, cooperative, no apparent distress  Respiratory: Clear to auscultation bilaterally  Cardiovascular: Regular rate and rhythm  GI: Normal bowel sounds, soft, non-distended, non-tender  Skin/Integumen: No rashes, no cyanosis      Medical Decision Making         Data     I have personally reviewed the following data over the past 24 hrs:    8.8  \   12.2   / 155     143 103 12.9 /  89   3.4 31 (H) " 0.54 \       ALT: 11 AST: 15 AP: 73 TBILI: 0.2   ALB: 3.6 TOT PROTEIN: 5.7 (L) LIPASE: N/A       Imaging results reviewed over the past 24 hrs:   No results found for this or any previous visit (from the past 24 hour(s)).

## 2023-02-08 ENCOUNTER — APPOINTMENT (OUTPATIENT)
Dept: OCCUPATIONAL THERAPY | Facility: CLINIC | Age: 68
DRG: 190 | End: 2023-02-08
Payer: COMMERCIAL

## 2023-02-08 ENCOUNTER — APPOINTMENT (OUTPATIENT)
Dept: PHYSICAL THERAPY | Facility: CLINIC | Age: 68
DRG: 190 | End: 2023-02-08
Payer: COMMERCIAL

## 2023-02-08 LAB
ALBUMIN SERPL BCG-MCNC: 3.5 G/DL (ref 3.5–5.2)
ALP SERPL-CCNC: 75 U/L (ref 35–104)
ALT SERPL W P-5'-P-CCNC: 14 U/L (ref 10–35)
ANION GAP SERPL CALCULATED.3IONS-SCNC: 9 MMOL/L (ref 7–15)
AST SERPL W P-5'-P-CCNC: 18 U/L (ref 10–35)
BASOPHILS # BLD AUTO: 0 10E3/UL (ref 0–0.2)
BASOPHILS NFR BLD AUTO: 0 %
BILIRUB SERPL-MCNC: 0.2 MG/DL
BUN SERPL-MCNC: 8.6 MG/DL (ref 8–23)
CALCIUM SERPL-MCNC: 8.7 MG/DL (ref 8.8–10.2)
CHLORIDE SERPL-SCNC: 103 MMOL/L (ref 98–107)
CREAT SERPL-MCNC: 0.56 MG/DL (ref 0.51–0.95)
DEPRECATED HCO3 PLAS-SCNC: 31 MMOL/L (ref 22–29)
EOSINOPHIL # BLD AUTO: 0.1 10E3/UL (ref 0–0.7)
EOSINOPHIL NFR BLD AUTO: 1 %
ERYTHROCYTE [DISTWIDTH] IN BLOOD BY AUTOMATED COUNT: 13.9 % (ref 10–15)
GFR SERPL CREATININE-BSD FRML MDRD: >90 ML/MIN/1.73M2
GLUCOSE BLDC GLUCOMTR-MCNC: 103 MG/DL (ref 70–99)
GLUCOSE BLDC GLUCOMTR-MCNC: 148 MG/DL (ref 70–99)
GLUCOSE BLDC GLUCOMTR-MCNC: 149 MG/DL (ref 70–99)
GLUCOSE BLDC GLUCOMTR-MCNC: 264 MG/DL (ref 70–99)
GLUCOSE BLDC GLUCOMTR-MCNC: 282 MG/DL (ref 70–99)
GLUCOSE SERPL-MCNC: 99 MG/DL (ref 70–99)
HCT VFR BLD AUTO: 42 % (ref 35–47)
HGB BLD-MCNC: 12.9 G/DL (ref 11.7–15.7)
IMM GRANULOCYTES # BLD: 0.1 10E3/UL
IMM GRANULOCYTES NFR BLD: 1 %
LYMPHOCYTES # BLD AUTO: 3.4 10E3/UL (ref 0.8–5.3)
LYMPHOCYTES NFR BLD AUTO: 39 %
MCH RBC QN AUTO: 27.7 PG (ref 26.5–33)
MCHC RBC AUTO-ENTMCNC: 30.7 G/DL (ref 31.5–36.5)
MCV RBC AUTO: 90 FL (ref 78–100)
MONOCYTES # BLD AUTO: 0.7 10E3/UL (ref 0–1.3)
MONOCYTES NFR BLD AUTO: 8 %
NEUTROPHILS # BLD AUTO: 4.5 10E3/UL (ref 1.6–8.3)
NEUTROPHILS NFR BLD AUTO: 51 %
NRBC # BLD AUTO: 0 10E3/UL
NRBC BLD AUTO-RTO: 0 /100
PLATELET # BLD AUTO: 144 10E3/UL (ref 150–450)
POTASSIUM SERPL-SCNC: 3.6 MMOL/L (ref 3.4–5.3)
PROT SERPL-MCNC: 5.8 G/DL (ref 6.4–8.3)
RBC # BLD AUTO: 4.65 10E6/UL (ref 3.8–5.2)
SODIUM SERPL-SCNC: 143 MMOL/L (ref 136–145)
WBC # BLD AUTO: 8.8 10E3/UL (ref 4–11)

## 2023-02-08 PROCEDURE — 120N000002 HC R&B MED SURG/OB UMMC

## 2023-02-08 PROCEDURE — 250N000013 HC RX MED GY IP 250 OP 250 PS 637

## 2023-02-08 PROCEDURE — 94799 UNLISTED PULMONARY SVC/PX: CPT

## 2023-02-08 PROCEDURE — 94640 AIRWAY INHALATION TREATMENT: CPT | Mod: 76

## 2023-02-08 PROCEDURE — 94640 AIRWAY INHALATION TREATMENT: CPT

## 2023-02-08 PROCEDURE — 97530 THERAPEUTIC ACTIVITIES: CPT | Mod: GP

## 2023-02-08 PROCEDURE — 97116 GAIT TRAINING THERAPY: CPT | Mod: GP

## 2023-02-08 PROCEDURE — 80053 COMPREHEN METABOLIC PANEL: CPT | Performed by: INTERNAL MEDICINE

## 2023-02-08 PROCEDURE — 97110 THERAPEUTIC EXERCISES: CPT | Mod: GO

## 2023-02-08 PROCEDURE — 250N000012 HC RX MED GY IP 250 OP 636 PS 637: Performed by: INTERNAL MEDICINE

## 2023-02-08 PROCEDURE — 250N000013 HC RX MED GY IP 250 OP 250 PS 637: Performed by: INTERNAL MEDICINE

## 2023-02-08 PROCEDURE — 36415 COLL VENOUS BLD VENIPUNCTURE: CPT | Performed by: INTERNAL MEDICINE

## 2023-02-08 PROCEDURE — 85025 COMPLETE CBC W/AUTO DIFF WBC: CPT | Performed by: INTERNAL MEDICINE

## 2023-02-08 PROCEDURE — 250N000009 HC RX 250: Performed by: STUDENT IN AN ORGANIZED HEALTH CARE EDUCATION/TRAINING PROGRAM

## 2023-02-08 PROCEDURE — 999N000157 HC STATISTIC RCP TIME EA 10 MIN

## 2023-02-08 PROCEDURE — 250N000011 HC RX IP 250 OP 636

## 2023-02-08 PROCEDURE — 97535 SELF CARE MNGMENT TRAINING: CPT | Mod: GO

## 2023-02-08 PROCEDURE — 99233 SBSQ HOSP IP/OBS HIGH 50: CPT | Performed by: STUDENT IN AN ORGANIZED HEALTH CARE EDUCATION/TRAINING PROGRAM

## 2023-02-08 PROCEDURE — 250N000009 HC RX 250: Performed by: INTERNAL MEDICINE

## 2023-02-08 PROCEDURE — 250N000013 HC RX MED GY IP 250 OP 250 PS 637: Performed by: STUDENT IN AN ORGANIZED HEALTH CARE EDUCATION/TRAINING PROGRAM

## 2023-02-08 RX ADMIN — INSULIN ASPART 3 UNITS: 100 INJECTION, SOLUTION INTRAVENOUS; SUBCUTANEOUS at 17:04

## 2023-02-08 RX ADMIN — UMECLIDINIUM 1 PUFF: 62.5 AEROSOL, POWDER ORAL at 08:41

## 2023-02-08 RX ADMIN — PREGABALIN 150 MG: 100 CAPSULE ORAL at 08:36

## 2023-02-08 RX ADMIN — FLUTICASONE PROPIONATE 1 SPRAY: 50 SPRAY, METERED NASAL at 08:41

## 2023-02-08 RX ADMIN — PREGABALIN 150 MG: 100 CAPSULE ORAL at 21:06

## 2023-02-08 RX ADMIN — FLUTICASONE FUROATE AND VILANTEROL TRIFENATATE 1 PUFF: 200; 25 POWDER RESPIRATORY (INHALATION) at 08:41

## 2023-02-08 RX ADMIN — PANTOPRAZOLE SODIUM 40 MG: 40 TABLET, DELAYED RELEASE ORAL at 08:39

## 2023-02-08 RX ADMIN — ASPIRIN 81 MG: 81 TABLET ORAL at 08:38

## 2023-02-08 RX ADMIN — CETIRIZINE HYDROCHLORIDE 10 MG: 10 TABLET, FILM COATED ORAL at 08:40

## 2023-02-08 RX ADMIN — ACETYLCYSTEINE 4 ML: 100 SOLUTION ORAL; RESPIRATORY (INHALATION) at 20:41

## 2023-02-08 RX ADMIN — ACETYLCYSTEINE 4 ML: 100 SOLUTION ORAL; RESPIRATORY (INHALATION) at 12:59

## 2023-02-08 RX ADMIN — GUAIFENESIN 600 MG: 600 TABLET ORAL at 08:39

## 2023-02-08 RX ADMIN — ALBUTEROL SULFATE 2.5 MG: 2.5 SOLUTION RESPIRATORY (INHALATION) at 20:41

## 2023-02-08 RX ADMIN — ALBUTEROL SULFATE 2.5 MG: 2.5 SOLUTION RESPIRATORY (INHALATION) at 12:59

## 2023-02-08 RX ADMIN — LIDOCAINE PATCH 4% 1 PATCH: 40 PATCH TOPICAL at 21:06

## 2023-02-08 RX ADMIN — NICOTINE 1 PATCH: 14 PATCH, EXTENDED RELEASE TRANSDERMAL at 08:47

## 2023-02-08 RX ADMIN — AMLODIPINE BESYLATE 10 MG: 10 TABLET ORAL at 08:39

## 2023-02-08 RX ADMIN — ALBUTEROL SULFATE 2 PUFF: 90 AEROSOL, METERED RESPIRATORY (INHALATION) at 08:37

## 2023-02-08 RX ADMIN — INSULIN ASPART 1 UNITS: 100 INJECTION, SOLUTION INTRAVENOUS; SUBCUTANEOUS at 14:07

## 2023-02-08 RX ADMIN — Medication 1 TABLET: at 08:39

## 2023-02-08 RX ADMIN — GLIPIZIDE 5 MG: 5 TABLET ORAL at 16:47

## 2023-02-08 RX ADMIN — GUAIFENESIN 600 MG: 600 TABLET ORAL at 21:06

## 2023-02-08 RX ADMIN — ENOXAPARIN SODIUM 40 MG: 40 INJECTION SUBCUTANEOUS at 08:36

## 2023-02-08 RX ADMIN — GLIPIZIDE 5 MG: 5 TABLET ORAL at 08:41

## 2023-02-08 RX ADMIN — PANTOPRAZOLE SODIUM 40 MG: 40 TABLET, DELAYED RELEASE ORAL at 21:06

## 2023-02-08 RX ADMIN — PREDNISONE 60 MG: 20 TABLET ORAL at 08:39

## 2023-02-08 RX ADMIN — LEVOFLOXACIN 500 MG: 5 INJECTION, SOLUTION INTRAVENOUS at 06:18

## 2023-02-08 RX ADMIN — EMPAGLIFLOZIN 25 MG: 25 TABLET, FILM COATED ORAL at 08:38

## 2023-02-08 ASSESSMENT — ACTIVITIES OF DAILY LIVING (ADL)
ADLS_ACUITY_SCORE: 34

## 2023-02-08 NOTE — DISCHARGE SUMMARY
[facility]  Hospitalist Discharge Summary      Date of Admission:  2/3/2023  Date of Discharge:  2/9/2023 11:30 AM  Discharging Provider: Edy Nash MD  Discharge Service: Hospitalist Service, GOLD TEAM 7    Discharge Diagnoses   Acute on chronic hypoxic respiratory failure  Acute exacerbation of COPD  Poorly differentiated adenocarcinoma of the lung, right lower lobe  Hypertension  Type 2 diabetes mellitus       Follow-ups Needed After Discharge     Discharge Disposition   Discharged to home  Condition at discharge: Stable    Hospital Course   Jenn Aparicio is a 67 year old female admitted on 2/3/2023. She has PMH of  Type 2 DM not on insulin c/b peripheral neuropathy, RLL adenocarcinoma s/p lobectomy in 2016, RUL NSCLC s/p SBRT (1/2020), HTN  COPD with acute on chronic hypoxic respiratory failure requiring 3L at baseline who presents with worsening SOB with activity and admitted for COPD exacerbation      # Mucous Plug  # COPD exacerbation  # Acute on chronic hypoxic respiratory failure  Patient was recently admitted for COPD exacerbation and discharged. States she continues to have SOB and hypoxia with exertion despite 3L NC. Having nonproductive but severe cough. Did not have wheezing on my exam but was after IV steroids and nebs. My suspicion is patient's ongoing SOB likely due to noted mucous plug and need for higher O2 with exertion and is not able to do the things she does due poor lung reserve. Cough improved with mucomyst, vest treatment, intrapulmonary percussive therapy, and treatment for COPD exacerbation. Patient required higher levels of flow for exertion than she currently is able to have with her home oxygen, walk test documented. Documentation for home oxygen below.    - Switched Solumedrol to prednisone 60 mg for total of 5 days  - levofloxacin x 5 days   - Discussed with COPD team, appreciate recs:               - Mucomyst with albuterol  "QID              - Intrapulmonary percussive therapy                - Vest therapy, see progress note from 2/7 and discharge summary for documentation              - Increase home oxygen, see progress note from 2/7 and discharge summary for documentation              - [id number] ventilation system, see separate progress note from 2/8 and discharge summary for documentation     # Lung adenocarcinoma, poorly diff adeno RLL  # S/P R thoracotomy, RLL lobectomy (2016) and radiation (2020)   Follows with Oncology at [facility]. Per chart the post radiation changes look fairly stable. Current CT scan without acute changes. Has follow up screening in coming 2 weeks.    Vest therapy documentation  Jenn's CT scan during this admission shows areas of mucous plugging. She has a very congested harsh cough that is not productive of any sputum. Patient stated, \"I feel like I have a big ball just sitting there.\" Jenn had been using her Aerobika for airway clearance for two years. Since the Aerobika has failed to provide adequate airway clearance for Jenn she will benefit from the use of Vest therapy at home.     Recommendations       Will need follow up appointment with Pulmonologist and complete PFT's.    14 mg Nicotine Patch to help reduce symptoms of withdrawal and cravings (Provide prescription at discharge for patient)    4mg Nicotine Lozenges for cravings and urges(Provide prescription at discharge for patient)    Provide prescription at discharge for Nicotrol inhaler    Referral for outpatient pulmonary Rehab    Referral for OP sleep consult to assess for sleep disordered breathing, DAVID, nocturnal hypoxia, and hypoventilation    At discharge continue with patient's home regimen of respiratory medications with the addition of Mucomyst nebs QID.    Oxygen Documentation  I certify that this patient, Jenn Aparicio has been under my care (or a nurse practitioner or physican's assistant working with me). This is the " face-to-face encounter for oxygen medical necessity.      At the time of this encounter supplemental oxygen is reasonable and necessary and is expected to improve the patient's condition in a home setting.       Patient has continued oxygen desaturation due to COPD J44.9.    If portability is ordered, is the patient mobile within the home? Yes    [id number] Ventilation System Documentation  Jenn has history of severe COPD and Chronic Respiratory failure. Jenn would benefit from the use of the [id number] Ventilation System to reduce WOB while ambulating, to increase exercise endurance, for portability, increased mobility and to assist with ADLs. Jenn has PCO2's that range from 48-58mm Hg and has a FEV1 of 24% on most recent PFT.     The ultimate goal is to augment the respiratory effort which has increased her dyspnea, with the outcome of less frequent hospital admissions.      Consultations This Hospital Stay   ONCOLOGY ADULT IP CONSULT  IP RESPIRATORY CARE CHRONIC PULMONARY DISEASE SPECIALIST  PHYSICAL THERAPY ADULT IP CONSULT  OCCUPATIONAL THERAPY ADULT IP CONSULT  CARE MANAGEMENT / SOCIAL WORK IP CONSULT    Code Status   No CPR- Do NOT Intubate    Time Spent on this Encounter   I, Edy Nash MD, personally saw the patient today and spent greater than 30 minutes discharging this patient.       Edy Nash MD  [facility] [address] [address]  Phone: [phone]  ______________________________________________________________________    Physical Exam   Vital Signs: Temp: 96.8  F (36  C) Temp src: Axillary BP: 124/53 Pulse: 93   Resp: 20 SpO2: 95 % O2 Device: Nasal cannula Oxygen Delivery: 3 LPM  Weight: 204 lbs 0 oz  Constitutional: Awake, alert, cooperative, no apparent distress  Respiratory: Clear to auscultation bilaterally  Cardiovascular: Regular rate and rhythm  GI: Normal bowel sounds, soft, non-distended, non-tender  Skin/Integumen: No rashes, no  cyanosis       Primary Care Physician   Mitzi Nettles    Discharge Orders      Sleep Study Referral      Home Oxygen DME    Patient has oxygen but needs Stationary concentrator with fill system and tanks for portability    Oxygen Documentation  I certify that this patient, Jenn Aparicio has been under my care (or a nurse practitioner or physican's assistant working with me). This is the face-to-face encounter for oxygen medical necessity.      At the time of this encounter supplemental oxygen is reasonable and necessary and is expected to improve the patient's condition in a home setting.       Patient has continued oxygen desaturation due to COPD J44.9.    If portability is ordered, is the patient mobile within the home? yes       Significant Results and Procedures   Most Recent 3 CBC's:Recent Labs   Lab Test 02/09/23  0644 02/08/23  0705 02/07/23  0537   WBC 9.3 8.8 8.8   HGB 13.8 12.9 12.2   MCV 91 90 91    144* 155     Most Recent 3 BMP's:Recent Labs   Lab Test 02/09/23  0749 02/09/23  0644 02/09/23  0214 02/08/23  0800 02/08/23  0705 02/07/23  0801 02/07/23  0537   NA  --  142  --   --  143  --  143   POTASSIUM  --  3.4  --   --  3.6  --  3.4   CHLORIDE  --  100  --   --  103  --  103   CO2  --  30*  --   --  31*  --  31*   BUN  --  10.1  --   --  8.6  --  12.9   CR  --  0.55  --   --  0.56  --  0.54   ANIONGAP  --  12  --   --  9  --  9   LUIGI  --  9.0  --   --  8.7*  --  8.4*   GLC 97 90 101*   < > 99   < > 94    < > = values in this interval not displayed.     Most Recent 2 LFT's:Recent Labs   Lab Test 02/09/23 0644 02/08/23 0705   AST 17 18   ALT 17 14   ALKPHOS 81 75   BILITOTAL 0.2 0.2   ,   Results for orders placed or performed during the hospital encounter of 02/03/23   XR Chest 2 Views    Narrative    EXAM: XR CHEST 2 VIEWS  LOCATION: [facility]  DATE/TIME: 2/3/2023 11:57 PM    INDICATION: SOB  COMPARISON: 2 view chest radiograph 01/04/2023       Impression    IMPRESSION: Denser appearance of right upper lobe nodule, again suspicious for malignancy. Flattening of the hemidiaphragms consistent with underlying emphysema. There is chronic appearing blunting of the right costophrenic angle. No new focal pulmonary   consolidation or effusion. Stable normal heart size. No pulmonary vascular congestion. No pneumothorax. No acute osseous abnormality.   CT Chest Pulmonary Embolism w Contrast    Narrative    EXAM: CT CHEST PULMONARY EMBOLISM W CONTRAST  LOCATION: [facility]  DATE/TIME: 2/4/2023 4:30 AM    INDICATION: SOB  COMPARISON: CT chest 01/01/2023  TECHNIQUE: CT chest pulmonary angiogram during arterial phase injection of IV contrast. Multiplanar reformats and MIP reconstructions were performed. Dose reduction techniques were used.   CONTRAST: iopamidol (ISOVUE 370) solution 69 mL       FINDINGS:  ANGIOGRAM CHEST: Pulmonary arteries are normal caliber and negative for pulmonary emboli. Thoracic aorta is negative for dissection. No CT evidence of right heart strain.    LUNGS AND PLEURA: Right lower lobectomy. Moderate emphysema. No significant change in a 2.3 x 1.5 cm spiculated nodule in the right upper lobe (series 7 image 94) with surrounding linear parenchymal opacities and subtle nodularity. Multiple tubular   opacities in the adjacent lung likely reflecting mucous plugging. No pleural effusion or pneumothorax.    MEDIASTINUM/AXILLAE: No lymphadenopathy. Normal esophagus. No significant pericardial effusion.    CORONARY ARTERY CALCIFICATION: Mild.    UPPER ABDOMEN: Normal.    MUSCULOSKELETAL: Mild degenerative changes.      Impression    IMPRESSION:  1.  No pulmonary embolism.  2.  No significant change in a 2.3 cm right upper lobe nodule suspicious for malignancy.  3.  Moderate emphysema.       Discharge Medications   Current Discharge Medication List      START taking these medications    Details    acetylcysteine (MUCOMYST) 10 % nebulizer solution Inhale 4 mLs into the lungs 4 times daily for 30 days  Qty: 480 mL, Refills: 0    Associated Diagnoses: Acute and chronic respiratory failure with hypoxia (H)      nicotine (NICORETTE) 4 MG lozenge Place 1 lozenge (4 mg) inside cheek every hour as needed for smoking cessation  Qty: 100 lozenge, Refills: 0    Associated Diagnoses: Cigarette smoker      nicotine (NICOTROL) 10 MG inhaler Use 1 cartridge as needed for urge to smoke by puffing over course of 20min.  Use 6-16 cart/day; reduce number of cart/day over 6-12 weeks.  Qty: 160 each, Refills: 0    Associated Diagnoses: Cigarette smoker         CONTINUE these medications which have CHANGED    Details   nicotine (NICODERM CQ) 14 MG/24HR 24 hr patch Place 1 patch onto the skin daily for 30 days  Qty: 30 patch, Refills: 0    Associated Diagnoses: Cigarette smoker         CONTINUE these medications which have NOT CHANGED    Details   albuterol (PROAIR HFA/PROVENTIL HFA/VENTOLIN HFA) 108 (90 Base) MCG/ACT inhaler INHALE 1 OR 2 PUFFS BY MOUTH EVERY 4 HOURS AS NEEDED  Qty: 18 g, Refills: 3    Comments: Pharmacy may dispense brand covered by insurance (Proair, or proventil or ventolin or generic albuterol inhaler)  Associated Diagnoses: Chronic obstructive pulmonary disease, unspecified COPD type (H)      amLODIPine (NORVASC) 10 MG tablet Take 1 tablet (10 mg) by mouth daily  Qty: 90 tablet, Refills: 3    Associated Diagnoses: Essential hypertension      aspirin 81 MG EC tablet Take 1 tablet (81 mg) by mouth daily  Qty: 90 tablet, Refills: 3    Associated Diagnoses: Essential hypertension; Type 2 diabetes mellitus without complication, without long-term current use of insulin (H)      cetirizine (ZYRTEC) 10 MG tablet Take 1 tablet (10 mg) by mouth daily  Qty: 30 tablet, Refills: 10    Associated Diagnoses: Seasonal allergic rhinitis, unspecified trigger      doxycycline hyclate (VIBRA-TABS) 100 MG tablet Take 1 tablet  (100 mg) by mouth 2 times daily  Qty: 5 tablet, Refills: 3    Associated Diagnoses: Pulmonary emphysema, unspecified emphysema type (H)      empagliflozin (JARDIANCE) 25 MG TABS tablet Take 1 tablet (25 mg) by mouth daily  Qty: 30 tablet, Refills: 3    Comments: Dose change  Associated Diagnoses: Type 2 diabetes mellitus with hyperglycemia, without long-term current use of insulin (H)      fluticasone (FLONASE) 50 MCG/ACT nasal spray Spray 1 spray into both nostrils daily Use at night before bed  Qty: 15.8 mL, Refills: 7    Associated Diagnoses: Seasonal allergic rhinitis, unspecified trigger      Fluticasone-Umeclidin-Vilanterol (TRELEGY ELLIPTA) 200-62.5-25 MCG/INH oral inhaler Inhale 1 puff into the lungs daily  Qty: 28 each, Refills: 5    Associated Diagnoses: Chronic obstructive pulmonary disease, unspecified COPD type (H)      glipiZIDE (GLUCOTROL) 5 MG tablet Take 1 tablet (5 mg) by mouth 2 times daily (before meals)  Qty: 180 tablet, Refills: 3    Associated Diagnoses: Type 2 diabetes mellitus without complication, without long-term current use of insulin (H)      glucosamine-chondroitin 500-400 MG tablet Take 1 tablet by mouth daily  Qty: 90 tablet, Refills: 3    Associated Diagnoses: Type 2 diabetes mellitus without complication, without long-term current use of insulin (H)      ipratropium - albuterol 0.5 mg/2.5 mg/3 mL (DUONEB) 0.5-2.5 (3) MG/3ML neb solution Take 1 vial (3 mLs) by nebulization every 6 hours as needed for shortness of breath or wheezing  Qty: 1080 mL, Refills: 0    Associated Diagnoses: Acute and chronic respiratory failure with hypoxia (H)      ketotifen (ZADITOR) 0.025 % ophthalmic solution Place 1 drop into both eyes daily      multivitamin w/minerals (THERA-VIT-M) tablet Take 1 tablet by mouth daily  Qty: 30 tablet, Refills: 11    Comments: Patient had medications at multiple pharmacies and wants to switch over to mail order.      omega-3 acid ethyl esters (LOVAZA) 1 g capsule Take 2  capsules (2 g) by mouth 2 times daily Please call [phone] to schedule follow up visit for more refills.  Qty: 360 capsule, Refills: 0    Associated Diagnoses: Type 2 diabetes mellitus without complication, without long-term current use of insulin (H)      omeprazole (PRILOSEC) 20 MG DR capsule Take 1 capsule (20 mg) by mouth 2 times daily  Qty: 180 capsule, Refills: 3    Associated Diagnoses: Gastroesophageal reflux disease, unspecified whether esophagitis present      polyethylene glycol-propylene glycol (SYSTANE) 0.4-0.3 % SOLN ophthalmic solution Place 1 drop into both eyes 4 times daily  Qty: 5 mL, Refills: 11    Comments: Patient had medications at multiple pharmacies and wants to switch over to mail order.  Associated Diagnoses: Dry eye syndrome of both eyes      pregabalin (LYRICA) 150 MG capsule Take 1 capsule (150 mg) by mouth 2 times daily  Qty: 60 capsule, Refills: 3    Comments: Dose change  Associated Diagnoses: Type 2 diabetes mellitus with hyperglycemia, without long-term current use of insulin (H); Diabetic peripheral neuropathy (H)      alcohol swab prep pads Use to swab area of injection/xander as directed.  Qty: 100 each, Refills: 1    Associated Diagnoses: Type 2 diabetes mellitus without complication, without long-term current use of insulin (H)      Continuous Blood Gluc  (FREESTYLE GIOVANNI 2 READER) JOSEPHINE 1 each daily For continuous glucose monitoring  Qty: 1 each, Refills: 0    Associated Diagnoses: Type 2 diabetes mellitus with hyperglycemia, without long-term current use of insulin (H)      !! Continuous Blood Gluc Sensor (FREESTYLE GIOVANNI 14 DAY SENSOR) MISC Change every 14 days.  Qty: 2 each, Refills: 11    Associated Diagnoses: Type 2 diabetes mellitus without complication, without long-term current use of insulin (H)      !! Continuous Blood Gluc Sensor (FREESTYLE GIOVANNI 2 SENSOR) MISC 1 each every 14 days For use with Freestyle Giovanni 2  for continuous monitioring of  blood glucose levels. Replace sensor every 14 days.  Qty: 2 each, Refills: 11    Associated Diagnoses: Type 2 diabetes mellitus with hyperglycemia, without long-term current use of insulin (H)      Easy Comfort Lancets MISC       Lancet Devices (EASY MINI EJECT LANCING DEVICE) MISC       STATIN NOT PRESCRIBED (INTENTIONAL) Please choose reason not prescribed from choices below.    Associated Diagnoses: Type 2 diabetes mellitus without complication, without long-term current use of insulin (H)       !! - Potential duplicate medications found. Please discuss with provider.      STOP taking these medications       benzonatate (TESSALON) 200 MG capsule Comments:   Reason for Stopping:             Allergies   Allergies   Allergen Reactions     Interferons Dermatitis     Penicillins Hives     Aspirin      325mg      Colon Care

## 2023-02-08 NOTE — PLAN OF CARE
Goal Outcome Evaluation:      Plan of Care Reviewed With: patient    Overall Patient Progress: no change    Outcome Evaluation: Continues with work up for incresed respiratory effort and airway clearance.    Reason for Admission: COPD exacerbation.     Status: No change    RN assumed cares at 0700    Vitals: WDL  Pain: PT reports pleuritic pain   Neuro: WDL  Cardiac: WDL   Respiratory: Pt continues on 3L NC, dyspnea on exertion, relentless cough  GI/: Voiding and stooling adequately  IV/Drains: PIV saline locked.  Activity: Up ad betsey  Skin: WDL  Labs: WDL    Plan of Care:  RN assumed cares at 0700, Pt alert and oriented, VS stable throughout the shift.  Pt up in the room independently.  Continues with dyspnea on exertion on 4L nasal cannula.  Nicotine patch initiated.  PIV saline locked. Plan to discharge home with home cares once medically ready and oxygen needs established.  No acute incidents this shift.  Continue to monitor and notify MD of any changes.

## 2023-02-08 NOTE — PROGRESS NOTES
Chronic Pulmonary Disease Specialist Progress Note    Visit today with Jenn to see how she is doing. Jenn asked writer why she had not received her morning neb and Volara yet. Writer told Jenn that I would check with the RT covering her floor and find out. Jenn acknowledged that she really feels like the Volara and the mucomyst are helping. Jenn has been able to move secretions to the point that she is able to cough up and swallow small amounts of sputum. Jenn also states that she continues to feel like there is a big ball in her chest.    Writer had conversation with Jenn about the potential use of the [id number] ventilatory assist device. Jenn is very excited to try this with the hope that she can move around more with less dyspnea.  Writer will pursue orders and additional documentation necessary.    Jenn has history of severe COPD and Chronic Respiratory failure. Jenn would benefit from the use of the [id number] Ventilation System to reduce WOB while ambulating, to increase exercise endurance, for portability, increased mobility and to assist with ADLs. Jenn has PCO2's that range from 48-58mm Hg and has a FEV1 of 24% on most recent PFT.    The ultimate goal is to augment the respiratory effort which has increased her dyspnea, with the outcome of less frequent hospital admissions.      SILVESTRE Mistry, RRT, CTTS  Chronic Pulmonary Disease Specialist  Office: [phone]   Pager: [phone]

## 2023-02-08 NOTE — PLAN OF CARE
Goal Outcome Evaluation:      Plan of Care Reviewed With: patient               Shift: Day    VS: WDL;   Pain:2/10; patient reports relief w/ use of lidocaine patch;   Neuro: A&OX4; calm and cooperative; ready to go home  Respiratory: stable on NC 3 (L) O2; home device ordered per provider  Diet/Appetite: regular diet; no new complaints  GI/:up ad betsey; voiding spontaneously; BM WDL  LDAs: PIV (R) Arm  Skin: WDL;   Activity: per patient tolerance; patient does not tolerate activity while maintaining O2; patient no longer using bed side commode; toilets independently in the toilet   Test/Procedures:   Labs:No RN labs; BG monitoring    Shift Summary: Plan: continue w/ poc      Goal Outcome Evaluation:       Plan of Care Reviewed With: patient     Overall Patient Progress: no changeOverall Patient Progress: no change     Outcome Evaluation: respiratory status, bg monitoring

## 2023-02-08 NOTE — PLAN OF CARE
Cares from: [id number]     V/S & pain: VSS on 3L NC, pain managed w/ scheduled po medications and lidocaine patch   Neuro: A/O x4, calm and cooperative   Respiratory: stable on 3L NC- per pt baseline, continuous pulse ox monitoring to the desk, coarse/diminished lung sounds, infrequent non-productive cough    Skin: no new skin concerns present  GI/: up ad betsey to bedside commode, voiding spontaneously w/ good UO, no BM   Nutrition: regular diet w/ good appetite and adequate po intake, BG monitoring stable   Lines/drains: R PIV infusing scheduled levofloxacin   Activity:  up Ax1/SBA for OOB activity ex up ad betsey to bedside commode  Labs: no RN managed labs, BG monitoring      Events this shift: no acute events this shift, pt slept well b/t cares. A/O x4. Pt remains stable on 3L NC per baseline, continuous pulse ox monitoring and tele to the desk, stable. BG monitoring stable throughout the shift. Pt up ad betsey to bedside commode w/ good uo but needing Ax1/SBA for other activity. call light w/in reach and able to make needs known, will continue to monitor.     Plan: continue w/ poc     Goal Outcome Evaluation:      Plan of Care Reviewed With: patient    Overall Patient Progress: no changeOverall Patient Progress: no change    Outcome Evaluation: respiratory status, bg monitoring

## 2023-02-08 NOTE — PROGRESS NOTES
[facility]    Medicine Progress Note - Hospitalist Service, GOLD TEAM 7    Date of Admission:  2/3/2023    Assessment & Plan   Jenn Aparicio is a 67 year old female admitted on 2/3/2023. She has PMH of  Type 2 DM not on insulin c/b peripheral neuropathy, RLL adenocarcinoma s/p lobectomy in 2016, RUL NSCLC s/p SBRT (1/2020), HTN  COPD with chronic hypoxic respiratory failure requiring 3L at baseline who presents with worsening SOB with activity and admitted for possible COPD exacerbation       # Mucous Plug  # COPD exacerbation  # Acute on chronic hypoxic respiratory failure  Patient was recently admitted for COPD exacerbation and discharged. States she continues to have SOB and hypoxia with exertion despite 3L NC. Having nonproductive but severe cough. Did not have wheezing on my exam but was after IV steroids and nebs. My suspicion is patient's ongoing SOB likely due to noted mucous plug and need for higher O2 with exertion and is not able to do the things she does due poor lung reserve.   - Switched Solumedrol to prednisone 60 mg for total of 5 days  - Cont levofloxacin x 5 days   - Discussed with COPD team, appreciate recs:    - Mucomyst with albuterol QID   - Intrapulmonary percussive therapy    - Vest therapy, see progress not from 2/7 and discharge summary for documentation   - Increase home oxygen, see progress not from 2/7 and discharge summary for documentation   - [id number] ventilation system, see separate progress note from 2/8 and discharge summary for documentation  - Will need ambulatory O2 assessment per PT  - PT/OT consult   - Plan for discharge tomorrow    # Lung adenocarcinoma, poorly diff adeno RLL  # S/P R thoracotomy, RLL lobectomy (2016) and radiation (2020)   Follows with Oncology at [facility]. Per chart the post radiation changes look fairly stable. Current CT scan without acute changes. Has follow up screening in coming 2 weeks.   - Onc  "consulted, no inpatient need and will follow-up with Dr. Leung on 3/1        #HTN  - Continue PTA amlodipine      #Type 2 Diabetes  # Peripheral neuropathy  Last A1c 9.4 on 1/23/23, baseline 8.3-9.4 over the last 1 year; so seems poorly controlled.   - Continue PTA empagliflozin, sliding scale insulin   - Follow-up as outpatient   - Continue PTA Lyrica for neuropathy      # GERD   #Dysphagia  -Continue PTA omeprazole     # Allergy  - Uses cetrizine at home, continue if needed        Diet: Combination Diet Regular Diet Adult    DVT Prophylaxis: Enoxaparin (Lovenox) SQ  Andrade Catheter: Not present  Lines: None     Cardiac Monitoring: None  Code Status: No CPR- Do NOT Intubate      Clinically Significant Risk Factors          # Hypocalcemia: Lowest Ca = 8.4 mg/dL in last 2 days, will monitor and replace as appropriate              # DMII: A1C = 9.4 % (Ref range: <5.7 %) within past 3 months   # Obesity: Estimated body mass index is 32.93 kg/m  as calculated from the following:    Height as of this encounter: 1.676 m (5' 6\").    Weight as of this encounter: 92.5 kg (204 lb).          Disposition Plan     Expected Discharge Date: 02/09/2023      Destination: home  Discharge Comments: Dispo: Home w/ assist, HH-PT/OT. PT to assess O2 need with exertion.   Progress: HH referrals sent, Sunday; walk test done  Plan: Needs new prescription - vicente and MD F2F note for O2          Edy Nash MD  Hospitalist Service, [address]  Securely message with [employer] (more info)  Text page via [facility] Paging/Directory   See signed in provider for up to date coverage information  ______________________________________________________________________    Interval History   No acute events overnight, continues to be SOB with exertion, requiring around 4 LPM with exertion. Walk test done with PT yesterday.     Physical Exam   Vital Signs: Temp: 98.3  F (36.8  C) Temp src: Oral " BP: 126/65 Pulse: 87   Resp: 18 SpO2: 95 % O2 Device: Nasal cannula Oxygen Delivery: 3 LPM  Weight: 204 lbs 0 oz  Constitutional: Awake, alert, cooperative, no apparent distress  Respiratory: Clear to auscultation bilaterally  Cardiovascular: Regular rate and rhythm  GI: Normal bowel sounds, soft, non-distended, non-tender  Skin/Integumen: No rashes, no cyanosis      Medical Decision Making         Data     I have personally reviewed the following data over the past 24 hrs:    8.8  \   12.9   / 144 (L)     N/A N/A N/A /  148 (H)   N/A N/A N/A \       Imaging results reviewed over the past 24 hrs:   No results found for this or any previous visit (from the past 24 hour(s)).

## 2023-02-08 NOTE — PROGRESS NOTES
Jenn has history of severe COPD and Chronic Respiratory failure. Jenn would benefit from the use of the [id number] Ventilation System to reduce WOB while ambulating, to increase exercise endurance, for portability, increased mobility and to assist with ADLs. Jenn has PCO2's that range from 48-58mm Hg and has a FEV1 of 24% on most recent PFT.     The ultimate goal is to augment the respiratory effort which has increased her dyspnea, with the outcome of less frequent hospital admissions.

## 2023-02-09 VITALS
BODY MASS INDEX: 32.78 KG/M2 | HEIGHT: 66 IN | RESPIRATION RATE: 16 BRPM | DIASTOLIC BLOOD PRESSURE: 80 MMHG | HEART RATE: 79 BPM | OXYGEN SATURATION: 98 % | SYSTOLIC BLOOD PRESSURE: 165 MMHG | TEMPERATURE: 98.1 F | WEIGHT: 204 LBS

## 2023-02-09 LAB
ALBUMIN SERPL BCG-MCNC: 3.9 G/DL (ref 3.5–5.2)
ALP SERPL-CCNC: 81 U/L (ref 35–104)
ALT SERPL W P-5'-P-CCNC: 17 U/L (ref 10–35)
ANION GAP SERPL CALCULATED.3IONS-SCNC: 12 MMOL/L (ref 7–15)
AST SERPL W P-5'-P-CCNC: 17 U/L (ref 10–35)
BASOPHILS # BLD AUTO: 0 10E3/UL (ref 0–0.2)
BASOPHILS NFR BLD AUTO: 0 %
BILIRUB SERPL-MCNC: 0.2 MG/DL
BUN SERPL-MCNC: 10.1 MG/DL (ref 8–23)
CALCIUM SERPL-MCNC: 9 MG/DL (ref 8.8–10.2)
CHLORIDE SERPL-SCNC: 100 MMOL/L (ref 98–107)
CREAT SERPL-MCNC: 0.55 MG/DL (ref 0.51–0.95)
DEPRECATED HCO3 PLAS-SCNC: 30 MMOL/L (ref 22–29)
EOSINOPHIL # BLD AUTO: 0.1 10E3/UL (ref 0–0.7)
EOSINOPHIL NFR BLD AUTO: 1 %
ERYTHROCYTE [DISTWIDTH] IN BLOOD BY AUTOMATED COUNT: 13.7 % (ref 10–15)
GFR SERPL CREATININE-BSD FRML MDRD: >90 ML/MIN/1.73M2
GLUCOSE BLDC GLUCOMTR-MCNC: 101 MG/DL (ref 70–99)
GLUCOSE BLDC GLUCOMTR-MCNC: 97 MG/DL (ref 70–99)
GLUCOSE SERPL-MCNC: 90 MG/DL (ref 70–99)
HCT VFR BLD AUTO: 45.1 % (ref 35–47)
HGB BLD-MCNC: 13.8 G/DL (ref 11.7–15.7)
IMM GRANULOCYTES # BLD: 0.1 10E3/UL
IMM GRANULOCYTES NFR BLD: 1 %
LYMPHOCYTES # BLD AUTO: 3.1 10E3/UL (ref 0.8–5.3)
LYMPHOCYTES NFR BLD AUTO: 33 %
MCH RBC QN AUTO: 27.7 PG (ref 26.5–33)
MCHC RBC AUTO-ENTMCNC: 30.6 G/DL (ref 31.5–36.5)
MCV RBC AUTO: 91 FL (ref 78–100)
MONOCYTES # BLD AUTO: 0.7 10E3/UL (ref 0–1.3)
MONOCYTES NFR BLD AUTO: 7 %
NEUTROPHILS # BLD AUTO: 5.4 10E3/UL (ref 1.6–8.3)
NEUTROPHILS NFR BLD AUTO: 58 %
NRBC # BLD AUTO: 0 10E3/UL
NRBC BLD AUTO-RTO: 0 /100
PLATELET # BLD AUTO: 165 10E3/UL (ref 150–450)
POTASSIUM SERPL-SCNC: 3.4 MMOL/L (ref 3.4–5.3)
PROT SERPL-MCNC: 6.4 G/DL (ref 6.4–8.3)
RBC # BLD AUTO: 4.98 10E6/UL (ref 3.8–5.2)
SODIUM SERPL-SCNC: 142 MMOL/L (ref 136–145)
WBC # BLD AUTO: 9.3 10E3/UL (ref 4–11)

## 2023-02-09 PROCEDURE — 250N000009 HC RX 250: Performed by: INTERNAL MEDICINE

## 2023-02-09 PROCEDURE — 250N000013 HC RX MED GY IP 250 OP 250 PS 637

## 2023-02-09 PROCEDURE — 250N000012 HC RX MED GY IP 250 OP 636 PS 637: Performed by: INTERNAL MEDICINE

## 2023-02-09 PROCEDURE — 99239 HOSP IP/OBS DSCHRG MGMT >30: CPT | Performed by: STUDENT IN AN ORGANIZED HEALTH CARE EDUCATION/TRAINING PROGRAM

## 2023-02-09 PROCEDURE — 250N000013 HC RX MED GY IP 250 OP 250 PS 637: Performed by: INTERNAL MEDICINE

## 2023-02-09 PROCEDURE — 36415 COLL VENOUS BLD VENIPUNCTURE: CPT | Performed by: INTERNAL MEDICINE

## 2023-02-09 PROCEDURE — 250N000009 HC RX 250: Performed by: STUDENT IN AN ORGANIZED HEALTH CARE EDUCATION/TRAINING PROGRAM

## 2023-02-09 PROCEDURE — 85004 AUTOMATED DIFF WBC COUNT: CPT | Performed by: INTERNAL MEDICINE

## 2023-02-09 PROCEDURE — 80053 COMPREHEN METABOLIC PANEL: CPT | Performed by: INTERNAL MEDICINE

## 2023-02-09 PROCEDURE — 250N000011 HC RX IP 250 OP 636

## 2023-02-09 RX ORDER — ALBUTEROL SULFATE 0.83 MG/ML
2.5 SOLUTION RESPIRATORY (INHALATION) 4 TIMES DAILY
Qty: 360 ML | Refills: 0 | Status: ON HOLD | OUTPATIENT
Start: 2023-02-09 | End: 2023-02-24

## 2023-02-09 RX ADMIN — PANTOPRAZOLE SODIUM 40 MG: 40 TABLET, DELAYED RELEASE ORAL at 10:20

## 2023-02-09 RX ADMIN — PREGABALIN 150 MG: 100 CAPSULE ORAL at 10:19

## 2023-02-09 RX ADMIN — ALBUTEROL SULFATE 2.5 MG: 2.5 SOLUTION RESPIRATORY (INHALATION) at 07:49

## 2023-02-09 RX ADMIN — AMLODIPINE BESYLATE 10 MG: 10 TABLET ORAL at 10:20

## 2023-02-09 RX ADMIN — ASPIRIN 81 MG: 81 TABLET ORAL at 10:19

## 2023-02-09 RX ADMIN — GUAIFENESIN 600 MG: 600 TABLET ORAL at 10:20

## 2023-02-09 RX ADMIN — CETIRIZINE HYDROCHLORIDE 10 MG: 10 TABLET, FILM COATED ORAL at 10:20

## 2023-02-09 RX ADMIN — ENOXAPARIN SODIUM 40 MG: 40 INJECTION SUBCUTANEOUS at 10:15

## 2023-02-09 RX ADMIN — EMPAGLIFLOZIN 25 MG: 25 TABLET, FILM COATED ORAL at 10:16

## 2023-02-09 RX ADMIN — PREDNISONE 60 MG: 20 TABLET ORAL at 10:19

## 2023-02-09 RX ADMIN — ACETAMINOPHEN 650 MG: 325 TABLET, FILM COATED ORAL at 08:56

## 2023-02-09 RX ADMIN — FLUTICASONE FUROATE AND VILANTEROL TRIFENATATE 1 PUFF: 200; 25 POWDER RESPIRATORY (INHALATION) at 10:15

## 2023-02-09 RX ADMIN — UMECLIDINIUM 1 PUFF: 62.5 AEROSOL, POWDER ORAL at 10:15

## 2023-02-09 RX ADMIN — GLIPIZIDE 5 MG: 5 TABLET ORAL at 10:20

## 2023-02-09 RX ADMIN — NICOTINE 1 PATCH: 14 PATCH, EXTENDED RELEASE TRANSDERMAL at 10:16

## 2023-02-09 RX ADMIN — FLUTICASONE PROPIONATE 1 SPRAY: 50 SPRAY, METERED NASAL at 10:18

## 2023-02-09 RX ADMIN — Medication 1 TABLET: at 10:19

## 2023-02-09 RX ADMIN — ACETYLCYSTEINE 4 ML: 100 SOLUTION ORAL; RESPIRATORY (INHALATION) at 07:49

## 2023-02-09 ASSESSMENT — ACTIVITIES OF DAILY LIVING (ADL)
ADLS_ACUITY_SCORE: 34

## 2023-02-09 NOTE — PROGRESS NOTES
Care Management Discharge Note    Discharge Date: 02/09/2023  Discharge Disposition: Home  Discharge Services: PCA, County Worker  Discharge DME: Oxygen, new concentrator w/ fill system; [employer]: LifeVest and [id number]  Discharge Transportation: family or friend will provide, health plan transportation  Private pay costs discussed: Not applicable  Education Provided on the Discharge Plan:  yes  Persons Notified of Discharge Plans: pt; Alcon Segundo MD, COPD RN, bedside RN; Charge RN  Patient/Family in Agreement with the Plan: yes  Handoff Referral Completed: Yes     [facility]  Phone: [phone]  Fax: [phone]    [facility]  Phone: [phone]   - Bedside delivery of portable (temporary) device for ride home; will meet pt within 2 hrs of discharge at her home for fulfillment of upgraded home O2 supplies.    [facility]   [phone]  Pick-up: [phone]    SILVESTRE Mistry, RRT, CTTS  Chronic Pulmonary Disease Specialist  Office: [phone]   Pager: [phone]   * Confirmed [employer] DME has been submitted via paper orders, completed with prescribing MD/team    Additional Information:  RNCC spoke with pt at the bedside, subsequent to clarifying home care needs and orders with Gold 9 MD and COPD RN Specialist; pt will discharge to home with new services via [facility]. SNV added to order (PT/OT) to assist in training, education, and use of new O2 equipment. Transportation arrangement delegated to [facility]. Pt will require transportation assistance, notably with O2 support. [facility] contacted for O2 fulfillment, portable device to be delivered to bedside and subsequent set-up at home.  Pt requesting to establish PCP services within [facility] system. Previous PCP (ARCHIE Nettles) has transitioned out of family practice, and insurance set her up within [facility], pt unfamiliar with designated provider and wants to consolidate providers to ; task and note sent to [facility] support. RNCC to  conclude following pt upon safe departure from floor and facility.      Randal Garcia RN BSN  5B RNCC  Phone: [phone]  Pager: [phone]    For Weekend & Holiday on call RN Care Coordinator:  (Tasks: Home care, home infusion, medical equipment, transportation, IMM & BENJAMIN forms, etc.)  [facility] (0800 - 1630) Saturday and Sunday    Units: 4A, 4C, 4E, 5A and 5B: [phone]    Units: 6A, 6B, 6C, 6D: [phone]    Units: 7A, 7B, 7C, 7D, and 5C: [phone]    [facility] ([phone]) Saturday and Sunday    Units: 5 Ortho, 8A, 10 ICU, & Pediatric Units: [phone]

## 2023-02-09 NOTE — PLAN OF CARE
"BP (!) 165/80 (BP Location: Left arm)   Pulse 79   Temp 98.1  F (36.7  C) (Oral)   Resp 16   Ht 1.676 m (5' 6\")   Wt 92.5 kg (204 lb)   SpO2 98%   BMI 32.93 kg/m      Care from: 0700 - 1130      VS & Pain: VSS ex hard BP. Endorsing chest pain and headache. Given tylenol with some improvement.    Neuro: A&Ox4. Calm and cooperative with cares.    Respiratory: 3L NC - patient baseline. Frequent cough.    Peripheral neurovascular: Numbness/tingling in all extremities @ baseline.    GI/: No BM this shift. Voiding WDL.    Nutrition: Regular diet. Good appetite and intake.    Skin: No new skin concerns this shift.    Lines: R piv removed.    Activity: Stand-by assist.    Events this shift: Call light within reach and able to make needs known.    Plan: Discharged @1130.  "

## 2023-02-09 NOTE — PROGRESS NOTES
Care Management Initial Consult    CHW was delegated by the RNCC to arrange transportation for pt around 11:30-12:00  CHW called [facility]  @ [phone] to book a taxi ride. CHW arranged for discharge taxi with [employer] transportation between 11:30-12:00. [email or web address] transport will calll pt phone along with the RNCC phone [phone]. CHW alerted charge and bedside nurse of discharging plans along with the RNCC.     Kael Muniz   Inpatient Community Health Worker  [facility] 5A & 5B

## 2023-02-09 NOTE — DISCHARGE INSTRUCTIONS
[facility]   Skilled Services: Nursing, Physical Therapy, Occupational Therapy  Phone: [phone]  Fax: [phone]    [facility]  Phone: [phone]  - Home Oxygen Equipment Support

## 2023-02-09 NOTE — PLAN OF CARE
Physical Therapy Discharge Summary    Reason for therapy discharge:    Discharged to home with home therapy.    Progress towards therapy goal(s). See goals on Care Plan in [facility] electronic health record for goal details.  Goals partially met.  Barriers to achieving goals:   discharge from facility.    Therapy recommendation(s):    Continued therapy is recommended.  Rationale/Recommendations:  to improve activity tolerance and safety with mobility.

## 2023-02-09 NOTE — PLAN OF CARE
Occupational Therapy Discharge Summary    Reason for therapy discharge:    Discharged to home with home therapy.    Progress towards therapy goal(s). See goals on Care Plan in [facility] electronic health record for goal details.  Goals partially met.  Barriers to achieving goals:   discharge from facility.    Therapy recommendation(s):    Continued therapy is recommended.  Rationale/Recommendations:  recommend continued HH OT to progress IND and safety with ADLs/IADLs and progress activity tolerance. .

## 2023-02-09 NOTE — SUMMARY OF CARE
Discharged to: Home  Transportation: Taxi with [email or web address] transport.  Time: 1130  Prescriptions: Picked up at discharge pharmacy and sent with patient.  Belongings: Sent home with patient.  PIV/Access: De-accessed.  Care Plan and Education discontinued: Yes  Paperwork: Reviewed with patient and questions answered. Discharge summary sent home with patient and discharge form signed.

## 2023-02-09 NOTE — PLAN OF CARE
Cares from: [id number]     V/S & pain: VSS on 3L NC, pain managed w/ scheduled po medications and lidocaine patch   Neuro: A/O x4, calm and cooperative   Respiratory: stable on 3L NC- per pt baseline, continuous pulse ox monitoring to the desk, diminished lung sounds, infrequent non-productive cough    Skin: no new skin concerns present  GI/: up ad betsey to bathroom, voiding spontaneously w/ good UO, no BM   Nutrition: regular diet w/ good appetite and adequate po intake, BG monitoring stable   Lines/drains: R PIV SL   Activity:  up Ax1/SBA for OOB activity ex up ad betsey in room   Labs: no RN managed labs, BG monitoring      Events this shift: no acute events this shift, pt slept well b/t cares. A/O x4. Pt remains stable on 3L NC per baseline, continuous pulse ox monitoring and tele to the desk, stable. BG monitoring stable throughout the shift. Pt up ad betsey in room w/ good uo but needing Ax1/SBA for other activity. call light w/in reach and able to make needs known, will continue to monitor.     Plan: continue w/ poc, possible discharge today    Goal Outcome Evaluation:      Plan of Care Reviewed With: patient    Overall Patient Progress: no changeOverall Patient Progress: no change    Outcome Evaluation: respiratory status, bg monitoring

## 2023-02-20 ENCOUNTER — HOSPITAL ENCOUNTER (INPATIENT)
Facility: CLINIC | Age: 68
LOS: 4 days | Discharge: HOME HEALTH | DRG: 189 | End: 2023-02-24
Attending: EMERGENCY MEDICINE | Admitting: HOSPITALIST
Payer: COMMERCIAL

## 2023-02-20 ENCOUNTER — APPOINTMENT (OUTPATIENT)
Dept: GENERAL RADIOLOGY | Facility: CLINIC | Age: 68
DRG: 189 | End: 2023-02-20
Attending: EMERGENCY MEDICINE
Payer: COMMERCIAL

## 2023-02-20 DIAGNOSIS — Z11.52 ENCOUNTER FOR SCREENING LABORATORY TESTING FOR SEVERE ACUTE RESPIRATORY SYNDROME CORONAVIRUS 2 (SARS-COV-2): ICD-10-CM

## 2023-02-20 DIAGNOSIS — J44.9 CHRONIC OBSTRUCTIVE PULMONARY DISEASE, UNSPECIFIED COPD TYPE (H): ICD-10-CM

## 2023-02-20 DIAGNOSIS — F17.210 CIGARETTE SMOKER: ICD-10-CM

## 2023-02-20 DIAGNOSIS — J96.21 ACUTE AND CHRONIC RESPIRATORY FAILURE WITH HYPOXIA (H): Primary | ICD-10-CM

## 2023-02-20 DIAGNOSIS — J44.1 COPD EXACERBATION (H): ICD-10-CM

## 2023-02-20 DIAGNOSIS — C34.90 PRIMARY ADENOCARCINOMA OF LUNG, UNSPECIFIED LATERALITY (H): ICD-10-CM

## 2023-02-20 LAB
ALBUMIN SERPL BCG-MCNC: 4.2 G/DL (ref 3.5–5.2)
ALP SERPL-CCNC: 98 U/L (ref 35–104)
ALT SERPL W P-5'-P-CCNC: 15 U/L (ref 10–35)
ANION GAP SERPL CALCULATED.3IONS-SCNC: 12 MMOL/L (ref 7–15)
AST SERPL W P-5'-P-CCNC: 32 U/L (ref 10–35)
ATRIAL RATE - MUSE: 109 BPM
BASE EXCESS BLDV CALC-SCNC: 1.8 MMOL/L (ref -7.7–1.9)
BASOPHILS # BLD AUTO: 0.1 10E3/UL (ref 0–0.2)
BASOPHILS NFR BLD AUTO: 1 %
BILIRUB SERPL-MCNC: 0.2 MG/DL
BUN SERPL-MCNC: 7.6 MG/DL (ref 8–23)
CA-I BLD-MCNC: 5.1 MG/DL (ref 4.4–5.2)
CALCIUM SERPL-MCNC: 9.2 MG/DL (ref 8.8–10.2)
CHLORIDE SERPL-SCNC: 102 MMOL/L (ref 98–107)
CPB POCT: NO
CREAT SERPL-MCNC: 0.5 MG/DL (ref 0.51–0.95)
D DIMER PPP FEU-MCNC: 0.35 UG/ML FEU (ref 0–0.5)
DEPRECATED HCO3 PLAS-SCNC: 28 MMOL/L (ref 22–29)
DIASTOLIC BLOOD PRESSURE - MUSE: NORMAL MMHG
EOSINOPHIL # BLD AUTO: 0.8 10E3/UL (ref 0–0.7)
EOSINOPHIL NFR BLD AUTO: 9 %
ERYTHROCYTE [DISTWIDTH] IN BLOOD BY AUTOMATED COUNT: 14.2 % (ref 10–15)
FLUAV RNA SPEC QL NAA+PROBE: NEGATIVE
FLUBV RNA RESP QL NAA+PROBE: NEGATIVE
GFR SERPL CREATININE-BSD FRML MDRD: >90 ML/MIN/1.73M2
GLUCOSE BLD-MCNC: 147 MG/DL (ref 70–99)
GLUCOSE SERPL-MCNC: 149 MG/DL (ref 70–99)
HCO3 BLDV-SCNC: 31 MMOL/L (ref 21–28)
HCO3 BLDV-SCNC: 38 MMOL/L (ref 21–28)
HCO3 BLDV-SCNC: 39 MMOL/L (ref 21–28)
HCT VFR BLD AUTO: 47.8 % (ref 35–47)
HCT VFR BLD CALC: 47 % (ref 35–47)
HGB BLD-MCNC: 14.2 G/DL (ref 11.7–15.7)
HGB BLD-MCNC: 16 G/DL (ref 11.7–15.7)
HOLD SPECIMEN: NORMAL
IMM GRANULOCYTES # BLD: 0 10E3/UL
IMM GRANULOCYTES NFR BLD: 0 %
INTERPRETATION ECG - MUSE: NORMAL
LACTATE BLD-SCNC: 0.8 MMOL/L
LACTATE SERPL-SCNC: 0.9 MMOL/L (ref 0.7–2)
LYMPHOCYTES # BLD AUTO: 2.7 10E3/UL (ref 0.8–5.3)
LYMPHOCYTES NFR BLD AUTO: 29 %
MCH RBC QN AUTO: 27.9 PG (ref 26.5–33)
MCHC RBC AUTO-ENTMCNC: 29.7 G/DL (ref 31.5–36.5)
MCV RBC AUTO: 94 FL (ref 78–100)
MONOCYTES # BLD AUTO: 0.7 10E3/UL (ref 0–1.3)
MONOCYTES NFR BLD AUTO: 8 %
NEUTROPHILS # BLD AUTO: 5.1 10E3/UL (ref 1.6–8.3)
NEUTROPHILS NFR BLD AUTO: 53 %
NRBC # BLD AUTO: 0 10E3/UL
NRBC BLD AUTO-RTO: 0 /100
NT-PROBNP SERPL-MCNC: 26 PG/ML (ref 0–900)
O2/TOTAL GAS SETTING VFR VENT: 40 %
P AXIS - MUSE: 86 DEGREES
PCO2 BLDV: 67 MM HG (ref 40–50)
PCO2 BLDV: 92 MM HG (ref 40–50)
PCO2 BLDV: 95 MM HG (ref 40–50)
PH BLDV: 7.22 [PH] (ref 7.32–7.43)
PH BLDV: 7.23 [PH] (ref 7.32–7.43)
PH BLDV: 7.27 [PH] (ref 7.32–7.43)
PLATELET # BLD AUTO: 188 10E3/UL (ref 150–450)
PO2 BLDV: 24 MM HG (ref 25–47)
PO2 BLDV: 24 MM HG (ref 25–47)
PO2 BLDV: 52 MM HG (ref 25–47)
POTASSIUM BLD-SCNC: 4.3 MMOL/L (ref 3.4–5.3)
POTASSIUM SERPL-SCNC: 4.7 MMOL/L (ref 3.4–5.3)
PR INTERVAL - MUSE: 150 MS
PROCALCITONIN SERPL IA-MCNC: 0.03 NG/ML
PROT SERPL-MCNC: 7.3 G/DL (ref 6.4–8.3)
QRS DURATION - MUSE: 74 MS
QT - MUSE: 328 MS
QTC - MUSE: 441 MS
R AXIS - MUSE: 71 DEGREES
RBC # BLD AUTO: 5.09 10E6/UL (ref 3.8–5.2)
RSV RNA SPEC NAA+PROBE: NEGATIVE
SAO2 % BLDV: 28 % (ref 94–100)
SAO2 % BLDV: 29 % (ref 94–100)
SARS-COV-2 RNA RESP QL NAA+PROBE: NEGATIVE
SODIUM BLD-SCNC: 143 MMOL/L (ref 133–144)
SODIUM SERPL-SCNC: 142 MMOL/L (ref 136–145)
SYSTOLIC BLOOD PRESSURE - MUSE: NORMAL MMHG
T AXIS - MUSE: 67 DEGREES
TROPONIN T BLD-MCNC: 0 UG/L
TROPONIN T SERPL HS-MCNC: <6 NG/L
VENTRICULAR RATE- MUSE: 109 BPM
WBC # BLD AUTO: 9.5 10E3/UL (ref 4–11)

## 2023-02-20 PROCEDURE — 36415 COLL VENOUS BLD VENIPUNCTURE: CPT | Performed by: EMERGENCY MEDICINE

## 2023-02-20 PROCEDURE — 71045 X-RAY EXAM CHEST 1 VIEW: CPT

## 2023-02-20 PROCEDURE — 85379 FIBRIN DEGRADATION QUANT: CPT | Performed by: EMERGENCY MEDICINE

## 2023-02-20 PROCEDURE — 99223 1ST HOSP IP/OBS HIGH 75: CPT | Mod: AI | Performed by: HOSPITALIST

## 2023-02-20 PROCEDURE — 250N000011 HC RX IP 250 OP 636: Performed by: EMERGENCY MEDICINE

## 2023-02-20 PROCEDURE — 99291 CRITICAL CARE FIRST HOUR: CPT | Mod: CS | Performed by: EMERGENCY MEDICINE

## 2023-02-20 PROCEDURE — 80053 COMPREHEN METABOLIC PANEL: CPT

## 2023-02-20 PROCEDURE — 5A09357 ASSISTANCE WITH RESPIRATORY VENTILATION, LESS THAN 24 CONSECUTIVE HOURS, CONTINUOUS POSITIVE AIRWAY PRESSURE: ICD-10-PCS | Performed by: EMERGENCY MEDICINE

## 2023-02-20 PROCEDURE — 999N000157 HC STATISTIC RCP TIME EA 10 MIN

## 2023-02-20 PROCEDURE — 84484 ASSAY OF TROPONIN QUANT: CPT | Performed by: EMERGENCY MEDICINE

## 2023-02-20 PROCEDURE — 83880 ASSAY OF NATRIURETIC PEPTIDE: CPT | Performed by: HOSPITALIST

## 2023-02-20 PROCEDURE — 82947 ASSAY GLUCOSE BLOOD QUANT: CPT | Performed by: EMERGENCY MEDICINE

## 2023-02-20 PROCEDURE — 94660 CPAP INITIATION&MGMT: CPT

## 2023-02-20 PROCEDURE — 99291 CRITICAL CARE FIRST HOUR: CPT | Mod: CS,25

## 2023-02-20 PROCEDURE — 258N000003 HC RX IP 258 OP 636: Performed by: EMERGENCY MEDICINE

## 2023-02-20 PROCEDURE — 93005 ELECTROCARDIOGRAM TRACING: CPT

## 2023-02-20 PROCEDURE — 82803 BLOOD GASES ANY COMBINATION: CPT | Performed by: EMERGENCY MEDICINE

## 2023-02-20 PROCEDURE — 82947 ASSAY GLUCOSE BLOOD QUANT: CPT

## 2023-02-20 PROCEDURE — 83605 ASSAY OF LACTIC ACID: CPT

## 2023-02-20 PROCEDURE — 84145 PROCALCITONIN (PCT): CPT | Performed by: HOSPITALIST

## 2023-02-20 PROCEDURE — 93010 ELECTROCARDIOGRAM REPORT: CPT | Performed by: EMERGENCY MEDICINE

## 2023-02-20 PROCEDURE — 85025 COMPLETE CBC W/AUTO DIFF WBC: CPT | Performed by: EMERGENCY MEDICINE

## 2023-02-20 PROCEDURE — 96375 TX/PRO/DX INJ NEW DRUG ADDON: CPT

## 2023-02-20 PROCEDURE — 94640 AIRWAY INHALATION TREATMENT: CPT

## 2023-02-20 PROCEDURE — 84484 ASSAY OF TROPONIN QUANT: CPT

## 2023-02-20 PROCEDURE — C9803 HOPD COVID-19 SPEC COLLECT: HCPCS

## 2023-02-20 PROCEDURE — 250N000009 HC RX 250: Performed by: EMERGENCY MEDICINE

## 2023-02-20 PROCEDURE — 71045 X-RAY EXAM CHEST 1 VIEW: CPT | Mod: 26 | Performed by: RADIOLOGY

## 2023-02-20 PROCEDURE — 96365 THER/PROPH/DIAG IV INF INIT: CPT

## 2023-02-20 PROCEDURE — 87637 SARSCOV2&INF A&B&RSV AMP PRB: CPT | Performed by: EMERGENCY MEDICINE

## 2023-02-20 PROCEDURE — 120N000002 HC R&B MED SURG/OB UMMC

## 2023-02-20 PROCEDURE — 83605 ASSAY OF LACTIC ACID: CPT | Performed by: EMERGENCY MEDICINE

## 2023-02-20 RX ORDER — DEXTROSE MONOHYDRATE 25 G/50ML
25-50 INJECTION, SOLUTION INTRAVENOUS
Status: DISCONTINUED | OUTPATIENT
Start: 2023-02-20 | End: 2023-02-21

## 2023-02-20 RX ORDER — METHYLPREDNISOLONE SODIUM SUCCINATE 125 MG/2ML
125 INJECTION, POWDER, LYOPHILIZED, FOR SOLUTION INTRAMUSCULAR; INTRAVENOUS ONCE
Status: COMPLETED | OUTPATIENT
Start: 2023-02-20 | End: 2023-02-20

## 2023-02-20 RX ORDER — NICOTINE POLACRILEX 4 MG
15-30 LOZENGE BUCCAL
Status: DISCONTINUED | OUTPATIENT
Start: 2023-02-20 | End: 2023-02-21

## 2023-02-20 RX ORDER — CEFTRIAXONE 2 G/1
2 INJECTION, POWDER, FOR SOLUTION INTRAMUSCULAR; INTRAVENOUS ONCE
Status: COMPLETED | OUTPATIENT
Start: 2023-02-20 | End: 2023-02-20

## 2023-02-20 RX ORDER — IPRATROPIUM BROMIDE AND ALBUTEROL SULFATE 2.5; .5 MG/3ML; MG/3ML
3 SOLUTION RESPIRATORY (INHALATION) ONCE
Status: COMPLETED | OUTPATIENT
Start: 2023-02-20 | End: 2023-02-20

## 2023-02-20 RX ORDER — ACETYLCYSTEINE 100 MG/ML
4 SOLUTION ORAL; RESPIRATORY (INHALATION) ONCE
Status: COMPLETED | OUTPATIENT
Start: 2023-02-20 | End: 2023-02-20

## 2023-02-20 RX ADMIN — METHYLPREDNISOLONE SODIUM SUCCINATE 125 MG: 125 INJECTION, POWDER, FOR SOLUTION INTRAMUSCULAR; INTRAVENOUS at 20:10

## 2023-02-20 RX ADMIN — AZITHROMYCIN MONOHYDRATE 500 MG: 500 INJECTION, POWDER, LYOPHILIZED, FOR SOLUTION INTRAVENOUS at 21:43

## 2023-02-20 RX ADMIN — IPRATROPIUM BROMIDE AND ALBUTEROL SULFATE 3 ML: 2.5; .5 SOLUTION RESPIRATORY (INHALATION) at 19:55

## 2023-02-20 RX ADMIN — ACETYLCYSTEINE 4 ML: 100 SOLUTION ORAL; RESPIRATORY (INHALATION) at 23:22

## 2023-02-20 RX ADMIN — CEFTRIAXONE SODIUM 2 G: 2 INJECTION, POWDER, FOR SOLUTION INTRAMUSCULAR; INTRAVENOUS at 21:25

## 2023-02-20 ASSESSMENT — ACTIVITIES OF DAILY LIVING (ADL)
ADLS_ACUITY_SCORE: 35
ADLS_ACUITY_SCORE: 35

## 2023-02-21 ENCOUNTER — DOCUMENTATION ONLY (OUTPATIENT)
Dept: EMERGENCY MEDICINE | Facility: CLINIC | Age: 68
End: 2023-02-21

## 2023-02-21 ENCOUNTER — APPOINTMENT (OUTPATIENT)
Dept: OCCUPATIONAL THERAPY | Facility: CLINIC | Age: 68
DRG: 189 | End: 2023-02-21
Attending: HOSPITALIST
Payer: COMMERCIAL

## 2023-02-21 LAB
ALBUMIN UR-MCNC: NEGATIVE MG/DL
ANION GAP SERPL CALCULATED.3IONS-SCNC: 14 MMOL/L (ref 7–15)
APPEARANCE UR: CLEAR
BASE EXCESS BLDV CALC-SCNC: 2.4 MMOL/L (ref -7.7–1.9)
BASE EXCESS BLDV CALC-SCNC: 2.4 MMOL/L (ref -7.7–1.9)
BASE EXCESS BLDV CALC-SCNC: 3.9 MMOL/L (ref -7.7–1.9)
BILIRUB UR QL STRIP: NEGATIVE
BUN SERPL-MCNC: 10.6 MG/DL (ref 8–23)
C PNEUM DNA SPEC QL NAA+PROBE: NOT DETECTED
CALCIUM SERPL-MCNC: 9.5 MG/DL (ref 8.8–10.2)
CHLORIDE SERPL-SCNC: 104 MMOL/L (ref 98–107)
COLOR UR AUTO: ABNORMAL
CREAT SERPL-MCNC: 0.48 MG/DL (ref 0.51–0.95)
DEPRECATED HCO3 PLAS-SCNC: 27 MMOL/L (ref 22–29)
ERYTHROCYTE [DISTWIDTH] IN BLOOD BY AUTOMATED COUNT: 14.2 % (ref 10–15)
FLUAV H1 2009 PAND RNA SPEC QL NAA+PROBE: NOT DETECTED
FLUAV H1 RNA SPEC QL NAA+PROBE: NOT DETECTED
FLUAV H3 RNA SPEC QL NAA+PROBE: NOT DETECTED
FLUAV RNA SPEC QL NAA+PROBE: NOT DETECTED
FLUBV RNA SPEC QL NAA+PROBE: NOT DETECTED
GFR SERPL CREATININE-BSD FRML MDRD: >90 ML/MIN/1.73M2
GLUCOSE BLDC GLUCOMTR-MCNC: 189 MG/DL (ref 70–99)
GLUCOSE BLDC GLUCOMTR-MCNC: 219 MG/DL (ref 70–99)
GLUCOSE BLDC GLUCOMTR-MCNC: 220 MG/DL (ref 70–99)
GLUCOSE BLDC GLUCOMTR-MCNC: 222 MG/DL (ref 70–99)
GLUCOSE BLDC GLUCOMTR-MCNC: 240 MG/DL (ref 70–99)
GLUCOSE BLDC GLUCOMTR-MCNC: 320 MG/DL (ref 70–99)
GLUCOSE SERPL-MCNC: 204 MG/DL (ref 70–99)
GLUCOSE UR STRIP-MCNC: >=1000 MG/DL
HADV DNA SPEC QL NAA+PROBE: NOT DETECTED
HCO3 BLDV-SCNC: 31 MMOL/L (ref 21–28)
HCO3 BLDV-SCNC: 31 MMOL/L (ref 21–28)
HCO3 BLDV-SCNC: 32 MMOL/L (ref 21–28)
HCOV PNL SPEC NAA+PROBE: NOT DETECTED
HCT VFR BLD AUTO: 48.1 % (ref 35–47)
HGB BLD-MCNC: 14.5 G/DL (ref 11.7–15.7)
HGB UR QL STRIP: NEGATIVE
HMPV RNA SPEC QL NAA+PROBE: NOT DETECTED
HOLD SPECIMEN: NORMAL
HOLD SPECIMEN: NORMAL
HPIV1 RNA SPEC QL NAA+PROBE: NOT DETECTED
HPIV2 RNA SPEC QL NAA+PROBE: NOT DETECTED
HPIV3 RNA SPEC QL NAA+PROBE: NOT DETECTED
HPIV4 RNA SPEC QL NAA+PROBE: NOT DETECTED
KETONES UR STRIP-MCNC: 60 MG/DL
LEUKOCYTE ESTERASE UR QL STRIP: NEGATIVE
M PNEUMO DNA SPEC QL NAA+PROBE: NOT DETECTED
MCH RBC QN AUTO: 27.8 PG (ref 26.5–33)
MCHC RBC AUTO-ENTMCNC: 30.1 G/DL (ref 31.5–36.5)
MCV RBC AUTO: 92 FL (ref 78–100)
NITRATE UR QL: NEGATIVE
O2/TOTAL GAS SETTING VFR VENT: 3 %
O2/TOTAL GAS SETTING VFR VENT: 40 %
O2/TOTAL GAS SETTING VFR VENT: 40 %
PCO2 BLDV: 59 MM HG (ref 40–50)
PCO2 BLDV: 62 MM HG (ref 40–50)
PCO2 BLDV: 68 MM HG (ref 40–50)
PH BLDV: 7.27 [PH] (ref 7.32–7.43)
PH BLDV: 7.3 [PH] (ref 7.32–7.43)
PH BLDV: 7.34 [PH] (ref 7.32–7.43)
PH UR STRIP: 5 [PH] (ref 5–7)
PLATELET # BLD AUTO: 191 10E3/UL (ref 150–450)
PO2 BLDV: 34 MM HG (ref 25–47)
PO2 BLDV: 36 MM HG (ref 25–47)
PO2 BLDV: 53 MM HG (ref 25–47)
POTASSIUM SERPL-SCNC: 4.3 MMOL/L (ref 3.4–5.3)
RBC # BLD AUTO: 5.22 10E6/UL (ref 3.8–5.2)
RSV RNA SPEC QL NAA+PROBE: NOT DETECTED
RSV RNA SPEC QL NAA+PROBE: NOT DETECTED
RV+EV RNA SPEC QL NAA+PROBE: NOT DETECTED
SODIUM SERPL-SCNC: 145 MMOL/L (ref 136–145)
SP GR UR STRIP: 1.03 (ref 1–1.03)
UROBILINOGEN UR STRIP-MCNC: NORMAL MG/DL
WBC # BLD AUTO: 9 10E3/UL (ref 4–11)

## 2023-02-21 PROCEDURE — 94640 AIRWAY INHALATION TREATMENT: CPT

## 2023-02-21 PROCEDURE — 250N000013 HC RX MED GY IP 250 OP 250 PS 637: Performed by: INTERNAL MEDICINE

## 2023-02-21 PROCEDURE — 82803 BLOOD GASES ANY COMBINATION: CPT | Performed by: INTERNAL MEDICINE

## 2023-02-21 PROCEDURE — 36415 COLL VENOUS BLD VENIPUNCTURE: CPT | Performed by: HOSPITALIST

## 2023-02-21 PROCEDURE — 80048 BASIC METABOLIC PNL TOTAL CA: CPT | Performed by: HOSPITALIST

## 2023-02-21 PROCEDURE — 82803 BLOOD GASES ANY COMBINATION: CPT | Performed by: HOSPITALIST

## 2023-02-21 PROCEDURE — 82962 GLUCOSE BLOOD TEST: CPT

## 2023-02-21 PROCEDURE — 250N000009 HC RX 250: Performed by: INTERNAL MEDICINE

## 2023-02-21 PROCEDURE — 36415 COLL VENOUS BLD VENIPUNCTURE: CPT | Performed by: INTERNAL MEDICINE

## 2023-02-21 PROCEDURE — 999N000157 HC STATISTIC RCP TIME EA 10 MIN

## 2023-02-21 PROCEDURE — 81003 URINALYSIS AUTO W/O SCOPE: CPT | Performed by: INTERNAL MEDICINE

## 2023-02-21 PROCEDURE — 250N000013 HC RX MED GY IP 250 OP 250 PS 637: Performed by: HOSPITALIST

## 2023-02-21 PROCEDURE — 120N000002 HC R&B MED SURG/OB UMMC

## 2023-02-21 PROCEDURE — 99222 1ST HOSP IP/OBS MODERATE 55: CPT | Performed by: INTERNAL MEDICINE

## 2023-02-21 PROCEDURE — 94660 CPAP INITIATION&MGMT: CPT

## 2023-02-21 PROCEDURE — 250N000012 HC RX MED GY IP 250 OP 636 PS 637: Performed by: HOSPITALIST

## 2023-02-21 PROCEDURE — 85027 COMPLETE CBC AUTOMATED: CPT | Performed by: HOSPITALIST

## 2023-02-21 PROCEDURE — 97165 OT EVAL LOW COMPLEX 30 MIN: CPT | Mod: GO | Performed by: OCCUPATIONAL THERAPIST

## 2023-02-21 PROCEDURE — 87581 M.PNEUMON DNA AMP PROBE: CPT | Performed by: INTERNAL MEDICINE

## 2023-02-21 PROCEDURE — 97530 THERAPEUTIC ACTIVITIES: CPT | Mod: GO | Performed by: OCCUPATIONAL THERAPIST

## 2023-02-21 PROCEDURE — 250N000009 HC RX 250: Performed by: HOSPITALIST

## 2023-02-21 PROCEDURE — 94640 AIRWAY INHALATION TREATMENT: CPT | Mod: 76

## 2023-02-21 PROCEDURE — 94799 UNLISTED PULMONARY SVC/PX: CPT

## 2023-02-21 PROCEDURE — 250N000011 HC RX IP 250 OP 636: Performed by: HOSPITALIST

## 2023-02-21 PROCEDURE — 99222 1ST HOSP IP/OBS MODERATE 55: CPT | Mod: GC | Performed by: INTERNAL MEDICINE

## 2023-02-21 RX ORDER — PREDNISONE 20 MG/1
40 TABLET ORAL DAILY
Status: DISCONTINUED | OUTPATIENT
Start: 2023-02-22 | End: 2023-02-21

## 2023-02-21 RX ORDER — AZITHROMYCIN 250 MG/1
250 TABLET, FILM COATED ORAL DAILY
Status: DISCONTINUED | OUTPATIENT
Start: 2023-02-21 | End: 2023-02-21

## 2023-02-21 RX ORDER — ACETYLCYSTEINE 100 MG/ML
4 SOLUTION ORAL; RESPIRATORY (INHALATION) 2 TIMES DAILY
Status: DISCONTINUED | OUTPATIENT
Start: 2023-02-21 | End: 2023-02-24 | Stop reason: HOSPADM

## 2023-02-21 RX ORDER — ASPIRIN 81 MG/1
81 TABLET ORAL DAILY
Status: CANCELLED | OUTPATIENT
Start: 2023-02-22

## 2023-02-21 RX ORDER — FLUTICASONE PROPIONATE 50 MCG
1 SPRAY, SUSPENSION (ML) NASAL DAILY
Status: DISCONTINUED | OUTPATIENT
Start: 2023-02-21 | End: 2023-02-24 | Stop reason: HOSPADM

## 2023-02-21 RX ORDER — NICOTINE 21 MG/24HR
1 PATCH, TRANSDERMAL 24 HOURS TRANSDERMAL DAILY
Status: DISCONTINUED | OUTPATIENT
Start: 2023-02-21 | End: 2023-02-24 | Stop reason: HOSPADM

## 2023-02-21 RX ORDER — DEXTROSE MONOHYDRATE 25 G/50ML
25-50 INJECTION, SOLUTION INTRAVENOUS
Status: DISCONTINUED | OUTPATIENT
Start: 2023-02-21 | End: 2023-02-24 | Stop reason: HOSPADM

## 2023-02-21 RX ORDER — NICOTINE POLACRILEX 4 MG
15-30 LOZENGE BUCCAL
Status: DISCONTINUED | OUTPATIENT
Start: 2023-02-21 | End: 2023-02-24 | Stop reason: HOSPADM

## 2023-02-21 RX ORDER — AZITHROMYCIN 250 MG/1
250 TABLET, FILM COATED ORAL DAILY
Status: DISCONTINUED | OUTPATIENT
Start: 2023-02-22 | End: 2023-02-24 | Stop reason: HOSPADM

## 2023-02-21 RX ORDER — ACETYLCYSTEINE 100 MG/ML
4 SOLUTION ORAL; RESPIRATORY (INHALATION) EVERY 4 HOURS
Status: DISCONTINUED | OUTPATIENT
Start: 2023-02-21 | End: 2023-02-21

## 2023-02-21 RX ORDER — CETIRIZINE HYDROCHLORIDE 10 MG/1
10 TABLET ORAL DAILY
Status: DISCONTINUED | OUTPATIENT
Start: 2023-02-21 | End: 2023-02-24 | Stop reason: HOSPADM

## 2023-02-21 RX ORDER — ACETAMINOPHEN 325 MG/1
650 TABLET ORAL EVERY 4 HOURS PRN
Status: DISCONTINUED | OUTPATIENT
Start: 2023-02-21 | End: 2023-02-24 | Stop reason: HOSPADM

## 2023-02-21 RX ORDER — ASPIRIN 81 MG/1
81 TABLET ORAL DAILY
Status: DISCONTINUED | OUTPATIENT
Start: 2023-02-21 | End: 2023-02-24 | Stop reason: HOSPADM

## 2023-02-21 RX ORDER — AMLODIPINE BESYLATE 10 MG/1
10 TABLET ORAL DAILY
Status: DISCONTINUED | OUTPATIENT
Start: 2023-02-21 | End: 2023-02-24 | Stop reason: HOSPADM

## 2023-02-21 RX ORDER — ENOXAPARIN SODIUM 100 MG/ML
40 INJECTION SUBCUTANEOUS EVERY 24 HOURS
Status: DISCONTINUED | OUTPATIENT
Start: 2023-02-21 | End: 2023-02-24 | Stop reason: HOSPADM

## 2023-02-21 RX ORDER — METHOCARBAMOL 500 MG/1
500 TABLET, FILM COATED ORAL EVERY 6 HOURS PRN
Status: DISCONTINUED | OUTPATIENT
Start: 2023-02-21 | End: 2023-02-24 | Stop reason: HOSPADM

## 2023-02-21 RX ORDER — METHYLPREDNISOLONE SODIUM SUCCINATE 125 MG/2ML
125 INJECTION, POWDER, LYOPHILIZED, FOR SOLUTION INTRAMUSCULAR; INTRAVENOUS ONCE
Status: COMPLETED | OUTPATIENT
Start: 2023-02-21 | End: 2023-02-21

## 2023-02-21 RX ORDER — LIDOCAINE 40 MG/G
CREAM TOPICAL
Status: DISCONTINUED | OUTPATIENT
Start: 2023-02-21 | End: 2023-02-24 | Stop reason: HOSPADM

## 2023-02-21 RX ORDER — PANTOPRAZOLE SODIUM 40 MG/1
40 TABLET, DELAYED RELEASE ORAL 2 TIMES DAILY
Status: DISCONTINUED | OUTPATIENT
Start: 2023-02-21 | End: 2023-02-24 | Stop reason: HOSPADM

## 2023-02-21 RX ORDER — AZITHROMYCIN 250 MG/1
500 TABLET, FILM COATED ORAL DAILY
Status: DISCONTINUED | OUTPATIENT
Start: 2023-02-22 | End: 2023-02-21

## 2023-02-21 RX ORDER — PREDNISONE 20 MG/1
40 TABLET ORAL DAILY
Status: DISCONTINUED | OUTPATIENT
Start: 2023-02-22 | End: 2023-02-24 | Stop reason: HOSPADM

## 2023-02-21 RX ORDER — IPRATROPIUM BROMIDE AND ALBUTEROL SULFATE 2.5; .5 MG/3ML; MG/3ML
3 SOLUTION RESPIRATORY (INHALATION) EVERY 4 HOURS
Status: DISCONTINUED | OUTPATIENT
Start: 2023-02-21 | End: 2023-02-22

## 2023-02-21 RX ORDER — OMEPRAZOLE
20 KIT
Status: DISCONTINUED | OUTPATIENT
Start: 2023-02-21 | End: 2023-02-21

## 2023-02-21 RX ORDER — PREGABALIN 75 MG/1
150 CAPSULE ORAL 2 TIMES DAILY
Status: DISCONTINUED | OUTPATIENT
Start: 2023-02-21 | End: 2023-02-24 | Stop reason: HOSPADM

## 2023-02-21 RX ADMIN — PREGABALIN 150 MG: 75 CAPSULE ORAL at 08:11

## 2023-02-21 RX ADMIN — PANTOPRAZOLE SODIUM 40 MG: 40 TABLET, DELAYED RELEASE ORAL at 08:11

## 2023-02-21 RX ADMIN — AMLODIPINE BESYLATE 10 MG: 10 TABLET ORAL at 08:11

## 2023-02-21 RX ADMIN — POLYETHYLENE GLYCOL 400 AND PROPYLENE GLYCOL 1 DROP: 4; 3 SOLUTION/ DROPS OPHTHALMIC at 17:59

## 2023-02-21 RX ADMIN — NICOTINE POLACRILEX 4 MG: 4 LOZENGE ORAL at 05:32

## 2023-02-21 RX ADMIN — IPRATROPIUM BROMIDE AND ALBUTEROL SULFATE 3 ML: 2.5; .5 SOLUTION RESPIRATORY (INHALATION) at 20:35

## 2023-02-21 RX ADMIN — ACETYLCYSTEINE 4 ML: 100 SOLUTION ORAL; RESPIRATORY (INHALATION) at 02:53

## 2023-02-21 RX ADMIN — ACETYLCYSTEINE 4 ML: 100 SOLUTION ORAL; RESPIRATORY (INHALATION) at 09:07

## 2023-02-21 RX ADMIN — PREGABALIN 150 MG: 75 CAPSULE ORAL at 01:35

## 2023-02-21 RX ADMIN — INSULIN ASPART 2 UNITS: 100 INJECTION, SOLUTION INTRAVENOUS; SUBCUTANEOUS at 12:14

## 2023-02-21 RX ADMIN — INSULIN ASPART 2 UNITS: 100 INJECTION, SOLUTION INTRAVENOUS; SUBCUTANEOUS at 00:37

## 2023-02-21 RX ADMIN — POLYETHYLENE GLYCOL 400 AND PROPYLENE GLYCOL 1 DROP: 4; 3 SOLUTION/ DROPS OPHTHALMIC at 11:56

## 2023-02-21 RX ADMIN — PANTOPRAZOLE SODIUM 40 MG: 40 TABLET, DELAYED RELEASE ORAL at 21:15

## 2023-02-21 RX ADMIN — METHYLPREDNISOLONE SODIUM SUCCINATE 125 MG: 125 INJECTION, POWDER, FOR SOLUTION INTRAMUSCULAR; INTRAVENOUS at 03:10

## 2023-02-21 RX ADMIN — PREGABALIN 150 MG: 75 CAPSULE ORAL at 21:15

## 2023-02-21 RX ADMIN — FLUTICASONE PROPIONATE 1 SPRAY: 50 SPRAY, METERED NASAL at 08:12

## 2023-02-21 RX ADMIN — OMEPRAZOLE 20 MG: KIT at 08:11

## 2023-02-21 RX ADMIN — ACETYLCYSTEINE 4 ML: 100 SOLUTION ORAL; RESPIRATORY (INHALATION) at 20:36

## 2023-02-21 RX ADMIN — INSULIN ASPART 4 UNITS: 100 INJECTION, SOLUTION INTRAVENOUS; SUBCUTANEOUS at 21:18

## 2023-02-21 RX ADMIN — ENOXAPARIN SODIUM 40 MG: 40 INJECTION SUBCUTANEOUS at 08:17

## 2023-02-21 RX ADMIN — INSULIN ASPART 1 UNITS: 100 INJECTION, SOLUTION INTRAVENOUS; SUBCUTANEOUS at 08:18

## 2023-02-21 RX ADMIN — IPRATROPIUM BROMIDE AND ALBUTEROL SULFATE 3 ML: 2.5; .5 SOLUTION RESPIRATORY (INHALATION) at 12:16

## 2023-02-21 RX ADMIN — METHOCARBAMOL 500 MG: 500 TABLET ORAL at 15:26

## 2023-02-21 RX ADMIN — NICOTINE 1 PATCH: 14 PATCH, EXTENDED RELEASE TRANSDERMAL at 08:11

## 2023-02-21 RX ADMIN — POLYETHYLENE GLYCOL 400 AND PROPYLENE GLYCOL 1 DROP: 4; 3 SOLUTION/ DROPS OPHTHALMIC at 08:12

## 2023-02-21 RX ADMIN — IPRATROPIUM BROMIDE AND ALBUTEROL SULFATE 3 ML: 2.5; .5 SOLUTION RESPIRATORY (INHALATION) at 02:53

## 2023-02-21 RX ADMIN — INSULIN ASPART 3 UNITS: 100 INJECTION, SOLUTION INTRAVENOUS; SUBCUTANEOUS at 17:58

## 2023-02-21 RX ADMIN — POLYETHYLENE GLYCOL 400 AND PROPYLENE GLYCOL 1 DROP: 4; 3 SOLUTION/ DROPS OPHTHALMIC at 21:15

## 2023-02-21 RX ADMIN — IPRATROPIUM BROMIDE AND ALBUTEROL SULFATE 3 ML: 2.5; .5 SOLUTION RESPIRATORY (INHALATION) at 09:07

## 2023-02-21 RX ADMIN — AZITHROMYCIN MONOHYDRATE 250 MG: 250 TABLET ORAL at 13:57

## 2023-02-21 RX ADMIN — IPRATROPIUM BROMIDE AND ALBUTEROL SULFATE 3 ML: 2.5; .5 SOLUTION RESPIRATORY (INHALATION) at 15:58

## 2023-02-21 RX ADMIN — CETIRIZINE HYDROCHLORIDE 10 MG: 10 TABLET, FILM COATED ORAL at 08:11

## 2023-02-21 RX ADMIN — INSULIN ASPART 2 UNITS: 100 INJECTION, SOLUTION INTRAVENOUS; SUBCUTANEOUS at 04:17

## 2023-02-21 ASSESSMENT — ACTIVITIES OF DAILY LIVING (ADL)
ADLS_ACUITY_SCORE: 35
FALL_HISTORY_WITHIN_LAST_SIX_MONTHS: YES
DOING_ERRANDS_INDEPENDENTLY_DIFFICULTY: YES
WEAR_GLASSES_OR_BLIND: YES
TOILETING_ISSUES: NO
CONCENTRATING,_REMEMBERING_OR_MAKING_DECISIONS_DIFFICULTY: NO
ADLS_ACUITY_SCORE: 37
ADLS_ACUITY_SCORE: 35
DIFFICULTY_EATING/SWALLOWING: YES
NUMBER_OF_TIMES_PATIENT_HAS_FALLEN_WITHIN_LAST_SIX_MONTHS: 1
ADLS_ACUITY_SCORE: 37
ADLS_ACUITY_SCORE: 35
ADLS_ACUITY_SCORE: 30
ADLS_ACUITY_SCORE: 35
DRESSING/BATHING_DIFFICULTY: NO
ADLS_ACUITY_SCORE: 35
WALKING_OR_CLIMBING_STAIRS: AMBULATION DIFFICULTY, REQUIRES EQUIPMENT
ADLS_ACUITY_SCORE: 35
EATING/SWALLOWING: EATING
WALKING_OR_CLIMBING_STAIRS_DIFFICULTY: YES
ADLS_ACUITY_SCORE: 35

## 2023-02-21 NOTE — ED PROVIDER NOTES
ED Provider Note  [facility]      History     Chief Complaint   Patient presents with     Shortness of Breath     HPI  Jenn Aparicio is a 67 year old female with history of RLL adenocarcinoma s/p lobectomy in 2016, RUL NSCLC s/p SBRT (1/2020), COPD, and chronic hypoxic respiratory failure requiring baseline 3 L presenting to the ED via EMS with 1 day worsening shortness of breath.     Per chart review patient recently admitted 2/3-2/9 with worsening SOB with activity and admitted for COPD exacerbation. CT w/o acute changes to lung cancer, but with areas of mucous plugging. Did not have wheezing on exam but did after IV steroids and nebs. Ongoing SOB likely d/t noted mucous plug and need for higher O2 with exertion and inability to do the things d/t poor lung reserve. Cough improved with mucomyst, vest treatment, intrapulmonary percussive therapy, and treatment for COPD exacerbation per Pulm recs. Switched Solumedrol to prednisone 60 mg for total of 5 days. Prescription fiven for Nicotrol inhaler, Levofloxacin x 5 days. Pt to continue home regimen of respiratory medications w/ addition of vest therapy and Mucomyst nebs QID. To f/u with pulm for PFT's. Referral for OP pulmonary rehab and sleep consult placed.     Chest XR 2/3/23  IMPRESSION: Denser appearance of right upper lobe nodule, again suspicious for malignancy. Flattening of the hemidiaphragms consistent with underlying emphysema. There is chronic appearing blunting of the right costophrenic angle. No new focal pulmonary   consolidation or effusion. Stable normal heart size. No pulmonary vascular congestion. No pneumothorax. No acute osseous abnormality.    CT chest PE 2/4/23  IMPRESSION:  1.  No pulmonary embolism.  2.  No significant change in a 2.3 cm right upper lobe nodule suspicious for malignancy.  3.  Moderate emphysema.    Past Medical History  Past Medical History:   Diagnosis Date     Adenocarcinoma, lung (H)      Asthma       Ectopic pregnancy      Esophageal reflux      Pulmonary emphysema (H)     Very severe FEV1<30% predicted     Type II diabetes mellitus (H)      Past Surgical History:   Procedure Laterality Date     2/8/16                R thoracotomy, RLL lobectomy (Dr. Cunha). Adenocarcinoma, 1.1 cm, assoicated with atypical adenomatous hyperplasia Right 02/08/2016    R thoracotomy, RLL lobectomy (Dr. Cunha). Adenocarcinoma, 1.1 cm, assoicated with atypical adenomatous hyperplasia     ESOPHAGOSCOPY, GASTROSCOPY, DUODENOSCOPY (EGD), COMBINED N/A 8/16/2022    Procedure: ESOPHAGOGASTRODUODENOSCOPY (EGD);  Surgeon: Travis Briseno MD;  Location:  GI     ORTHOPEDIC SURGERY       acetylcysteine (MUCOMYST) 10 % nebulizer solution  albuterol (PROAIR HFA/PROVENTIL HFA/VENTOLIN HFA) 108 (90 Base) MCG/ACT inhaler  albuterol (PROVENTIL) (2.5 MG/3ML) 0.083% neb solution  alcohol swab prep pads  amLODIPine (NORVASC) 10 MG tablet  aspirin 81 MG EC tablet  cetirizine (ZYRTEC) 10 MG tablet  Continuous Blood Gluc  (FREESTYLE GIOVANNI 2 READER) JOSEPHINE  Continuous Blood Gluc Sensor (FREESTYLE GIOVANNI 14 DAY SENSOR) MISC  Continuous Blood Gluc Sensor (FREESTYLE GIOVANNI 2 SENSOR) MISC  doxycycline hyclate (VIBRA-TABS) 100 MG tablet  Easy Comfort Lancets MISC  empagliflozin (JARDIANCE) 25 MG TABS tablet  fluticasone (FLONASE) 50 MCG/ACT nasal spray  Fluticasone-Umeclidin-Vilanterol (TRELEGY ELLIPTA) 200-62.5-25 MCG/INH oral inhaler  glipiZIDE (GLUCOTROL) 5 MG tablet  glucosamine-chondroitin 500-400 MG tablet  ketotifen (ZADITOR) 0.025 % ophthalmic solution  Lancet Devices (EASY MINI EJECT LANCING DEVICE) MISC  multivitamin w/minerals (THERA-VIT-M) tablet  nicotine (NICODERM CQ) 14 MG/24HR 24 hr patch  nicotine (NICORETTE) 4 MG lozenge  nicotine (NICOTROL) 10 MG inhaler  omega-3 acid ethyl esters (LOVAZA) 1 g capsule  omeprazole (PRILOSEC) 20 MG DR capsule  polyethylene glycol-propylene glycol (SYSTANE) 0.4-0.3 % SOLN ophthalmic  solution  pregabalin (LYRICA) 150 MG capsule  STATIN NOT PRESCRIBED (INTENTIONAL)      Allergies   Allergen Reactions     Interferons Dermatitis     Penicillins Hives     Aspirin      325mg      Colon Care      Family History  Family History   Problem Relation Age of Onset     Lung Cancer Sister      Depression Daughter      Alcohol/Drug Other         self     Diabetes Other         self     Thyroid Disease Other         self     Asthma Other         self     Social History   Social History     Tobacco Use     Smoking status: Every Day     Packs/day: 0.25     Types: Cigarettes     Smokeless tobacco: Former     Tobacco comments:     01/02/2023 Patient using 14 mg patch, wants to have prescription for Nicotrol inhaler, took workbook   Substance Use Topics     Alcohol use: Yes     Comment: sometime     Drug use: No         A medically appropriate review of systems was performed with pertinent positives and negatives noted in the HPI, and all other systems negative.    Physical Exam   BP: (!) 195/88  Pulse: 112  Temp: 97.7  F (36.5  C)  Resp: (!) 32  SpO2: 100 %  Physical Exam  Vitals and nursing note reviewed.   Constitutional:       General: She is in acute distress.      Appearance: Normal appearance. She is ill-appearing. She is not toxic-appearing.   HENT:      Head: Normocephalic and atraumatic.      Nose: Nose normal.      Mouth/Throat:      Mouth: Mucous membranes are moist.   Eyes:      Pupils: Pupils are equal, round, and reactive to light.   Cardiovascular:      Rate and Rhythm: Normal rate.      Pulses: Normal pulses.      Heart sounds: Normal heart sounds.   Pulmonary:      Effort: Respiratory distress present.      Breath sounds: Wheezing present.      Comments: Accessory muscle use, tachypnea, consistent with moderate to severe respiratory distress.  Abdominal:      General: Abdomen is flat. There is no distension.   Musculoskeletal:         General: No swelling or deformity. Normal range of motion.       Cervical back: Normal range of motion. No rigidity.   Skin:     General: Skin is warm.      Capillary Refill: Capillary refill takes less than 2 seconds.   Neurological:      Mental Status: She is alert and oriented to person, place, and time.   Psychiatric:         Mood and Affect: Mood normal.         ED Course, Procedures, & Data     ED Course as of 02/20/23 2002 Mon Feb 20, 2023   1950 Patient with history of COPD, currently on 2 L O2 at home, presents with dyspnea.  On EMS arrival, patient had O2 sats of 70 to 75% on 4 L oxygen.  Quickly improved to 100% on CPAP.  Patient reports using her inhalers at home.  She is concerned because her new oxygen machine seems to set off randomly.   1951 pCO2V Venous POCT(!!): 95   1952      EKG Interpretation:     Interpreted by Benny Jaimes DO  Time reviewed:1953   Symptoms at time of EKG: dyspnea   Rhythm: sinus tach   Rate: 109  Axis: NORMAL  Ectopy: none  Conduction: normal  ST Segments/ T Waves: No ST-T wave changes  Q Waves: none  Comparison to prior: Unchanged    Clinical Impression: sinus tach, o/w normal EKG      Procedures       ED Course Selections:   Critical Care Addendum  My initial assessment, based on my review of prehospital provider report, review of nursing observations, review of vital signs, focused history, physical exam, review of cardiac rhythm monitor and 12 lead ECG analysis, established a high suspicion that Jenn Aparicio has respiratory distress, which requires immediate intervention, and therefore she is critically ill.     After the initial assessment, the care team initiated multiple lab tests and initiated intensive non-invasive respiratory support to provide stabilization care. Due to the critical nature of this patient, I reassessed nursing observations, vital signs, mental status, neurologic status and respiratory status multiple times prior to her disposition.     Time also spent performing documentation, reviewing test results and  coordination of care.     Critical care time (excluding teaching time and procedures): 60 minutes.                Results for orders placed or performed during the hospital encounter of 02/20/23   [place] Draw     Status: None (In process)    Narrative    The following orders were created for panel order [place] Draw.  Procedure                               Abnormality         Status                     ---------                               -----------         ------                     Extra Blue Top Tube[[id number]]                                                         Extra Red Top Tube[[id number]]                               In process                 Extra Green Top (Lithium...[[id number]]                      In process                 Extra Purple Top Tube[[id number]]                                                         Please view results for these tests on the individual orders.   iStat Gases (lactate) venous, POCT     Status: Abnormal   Result Value Ref Range    Lactic Acid POCT 0.8 <=2.0 mmol/L    Bicarbonate Venous POCT 39 (H) 21 - 28 mmol/L    O2 Sat, Venous POCT 29 (L) 94 - 100 %    pCO2V Venous POCT 95 (HH) 40 - 50 mm Hg    pH Venous POCT 7.23 (L) 7.32 - 7.43    pO2 Venous POCT 24 (L) 25 - 47 mm Hg   iStat Gases Electrolytes ICA Glucose Venous, POCT     Status: Abnormal   Result Value Ref Range    CPB Applied No     Hematocrit POCT 47 35 - 47 %    Calcium, Ionized Whole Blood POCT 5.1 4.4 - 5.2 mg/dL    Glucose Whole Blood POCT 147 (H) 70 - 99 mg/dL    Bicarbonate Venous POCT 38 (H) 21 - 28 mmol/L    Hemoglobin POCT 16.0 (H) 11.7 - 15.7 g/dL    Potassium POCT 4.3 3.4 - 5.3 mmol/L    Sodium POCT 143 133 - 144 mmol/L    pCO2 Venous POCT 92 (HH) 40 - 50 mm Hg    pO2 Venous POCT 24 (L) 25 - 47 mm Hg    pH Venous POCT 7.22 (L) 7.32 - 7.43    O2 Sat, Venous POCT 28 (L) 94 - 100 %   iStat Troponin, POCT     Status: Normal   Result Value Ref Range    TROPPC POCT 0.00 <=0.12 ug/L   EKG 12-lead, tracing  only     Status: None   Result Value Ref Range    Systolic Blood Pressure  mmHg    Diastolic Blood Pressure  mmHg    Ventricular Rate 109 BPM    Atrial Rate 109 BPM    AL Interval 150 ms    QRS Duration 74 ms     ms    QTc 441 ms    P Axis 86 degrees    R AXIS 71 degrees    T Axis 67 degrees    Interpretation ECG       Sinus tachycardia  Septal infarct , age undetermined  Abnormal ECG  Unconfirmed report - interpretation of this ECG is computer generated - see medical record for final interpretation  Confirmed by - EMERGENCY ROOM, PHYSICIAN ([id number]),  JAYNE AGUILAR ([id number]) on 2/20/2023 8:01:16 PM     CBC with platelets differential     Status: None ()    Narrative    The following orders were created for panel order CBC with platelets differential.  Procedure                               Abnormality         Status                     ---------                               -----------         ------                     CBC with platelets and d...[[id number]]                                                   Please view results for these tests on the individual orders.     Medications   methylPREDNISolone sodium succinate (solu-MEDROL) injection 125 mg (has no administration in time range)   ipratropium - albuterol 0.5 mg/2.5 mg/3 mL (DUONEB) neb solution 3 mL (3 mLs Nebulization Given 2/1955)     Labs Ordered and Resulted from Time of ED Arrival to Time of ED Departure   ISTAT GASES LACTATE VENOUS POCT - Abnormal       Result Value    Lactic Acid POCT 0.8      Bicarbonate Venous POCT 39 (*)     O2 Sat, Venous POCT 29 (*)     pCO2V Venous POCT 95 (*)     pH Venous POCT 7.23 (*)     pO2 Venous POCT 24 (*)    ISTAT GASES ELECTROLYTES ICA GLUCOSE VENOUS POCT - Abnormal    CPB Applied No      Hematocrit POCT 47      Calcium, Ionized Whole Blood POCT 5.1      Glucose Whole Blood POCT 147 (*)     Bicarbonate Venous POCT 38 (*)     Hemoglobin POCT 16.0 (*)     Potassium POCT 4.3      Sodium POCT 143       pCO2 Venous POCT 92 (*)     pO2 Venous POCT 24 (*)     pH Venous POCT 7.22 (*)     O2 Sat, Venous POCT 28 (*)    ISTAT TROPONIN POCT - Normal    TROPPC POCT 0.00     COMPREHENSIVE METABOLIC PANEL   TROPONIN T, HIGH SENSITIVITY   INFLUENZA A/B & SARS-COV2 PCR MULTIPLEX   LACTIC ACID WHOLE BLOOD   CBC WITH PLATELETS AND DIFFERENTIAL     XR Chest Port 1 View    (Results Pending)              Medical Decision Making  The patient's presentation was of high complexity (an acute health issue posing potential threat to life or bodily function).    The patient's evaluation involved:  ordering and/or review of 3+ test(s) in this encounter (cbc, cxr, cmp, vbg)    The patient's management necessitated high risk (a decision regarding hospitalization).      Assessment & Plan    Patient presents in severe respiratory distress.  Was placed on BiPAP.  Initial PCO2 is 94.  After approximately 2 hours of BiPAP, PCO2 is now in the 60s.  Patient is saturating 100% on minimal settings.  Currently on 10/5 with FiO2 of 40%.  Chest x-ray does show some opacities.  Patient reported some coughing so we will treat for pneumonia.  Patient started on ceftriaxone and azithromycin.  Case discussed with the medicine team.  Will need intermediate level of care.    I have reviewed the nursing notes. I have reviewed the findings, diagnosis, plan and need for follow up with the patient.    New Prescriptions    No medications on file       Final diagnoses:   COPD exacerbation (H)       Benny Jaimes DO  [facility] EMERGENCY DEPARTMENT  2/20/2023     Benny Jaimes DO  02/20/23 [id number]

## 2023-02-21 NOTE — ED NOTES
Multiple attempts were made to reach Respiratory. Pt has Nebulizer treatment ordered that requires filter. Still can't get hold of Respiratory. Charge notified.

## 2023-02-21 NOTE — CONSULTS
Discharge Pharmacy Test Claim    Roflumilast requires prior authorization through patient's [facility] Dual plan. Pharmacy liaison submitted a PA request and received approval. Patient will have $0 expected copay.    Test Claim Copay   roflumilast 0.00       Cleopatra Colt  [facility] Pharmacy Liaison  Ph: [phone] Pager: [phone]   Securely message with the Vocera Web Console (learn more here)

## 2023-02-21 NOTE — PROGRESS NOTES
Prior Authorization Approval    Roflumilast 500mcg tablets  Date Initiated: 2/21/2023  Date Completed: 2/21/2023  Prior Auth Type: Step Therapy                Status: Approved    Effective Date: 01/22/2023 - 02/21/2024  Copay: 0.00     Filling Pharmacy: [facility] - [place], [place] - [address] [place] [address]    Insurance: [employer] - Phone [phone] Fax [phone]  ID: [id number]  [facility] Key/Case Number: [id number] / [id number]   Submitted Via: Tor Gregory  [facility] Pharmacy Liaison  Ph: [phone] Pager: [phone]

## 2023-02-21 NOTE — PROGRESS NOTES
Physician Attestation   I, Gab Walden MD, was present with the medical/ANN student who participated in the service and in the documentation of the note.  I have verified the history and personally performed the physical exam and medical decision making.  I agree with the assessment and plan of care as documented in the note.      Key findings:     New admission overnight for COPD exacerbation  Third episode in less than two months  Was prescribed Lifesaver one week ago. Still unclear at this time whether it will be effective. Continue to work with RT on training and optimal use.   Case d/w pulmonary. Longer prednisone taper as below, azithro 250 indefinitely for now, mucomyst twice daily.   Ok for med/surg bed for now as stable off BiPAP since overnight.     Gab Wladen MD  Date of Service (when I saw the patient): 02/21/23    [facility]    Progress Note - Hospitalist Service, GOLD TEAM 9       Date of Admission:  2/20/2023    Assessment & Plan   Jenn Aparicio is a 67 year old female with HTN, type 2 diabetes c/b neuropathy, and COPD admitted on 2/20/2023 for acute hypoxic hypercapneic respiratory failure most likely 2/2 COPD exacerbation     Changes today:   - RVP   - Intrapulmonary percussive therapy QID with RT  - Onc consult as FYI    COPD exacerbation  Acute hypoxic hypercapneic respiratory failure  Presented to ED w/ increased SOB and dyspnea developing over the weekend, consistent with COPD exacerbation. Hx of COPD on 3L-4L O2 at baseline, in setting of 40 pack year smoking history, currently smoking. PFTs 12/2020 with FEV1 24%, FVC 44%, FEV1/FVC 44%, DLCO 38%. Of note, this is her 3rd COPD exacerbation requiring hospitalization in past 2 months (1/1-1/4, 2/3-2/9). PTA regimen is trelegy with SEAN PRN. Initiated Lifesaver 2000 at most recent discharge, unclear yet if effective.   Unclear trigger for this exacerbation with negative flu,  COVID, RSV, no evidence of pneumonia on CXR, negative D-dimer, BNP and troponin wnl on admission. Could consider steroid rebound vs environmental exposure as patient did stop steroids for prior exacerbation several days ago and reported dyspnea increased after exposure to cleaning products overnight the weekend. S/p IV methylpred 125mg x2 (1 in ED, 1 on floor), azithro 500mg, and ceftri 2g in ED. Work of breathing improved this morning, transitioned to NC, though VBG still showing retained CO2.  - Pulmonary consult, appreciate recs  - Inpatient COPD consult, appreciate recs  - PO Prednisone 40mg x5 days, consider a taper upon discharge to prevent rebound exacerbation  - PO Azithro x5 days (Qtc 441)  - Duoneb Q4H (SEAN + Anticholinergic)  - Acetylcysteine neb Q4h   - Most recent CT chest 2/4/23. Can discuss role of repeat imaging with pulmonary  - Continue supplemental oxygen to keep O2sat >92%  - BIPAP as needed for work of breathing, titrate to NC as able  - Intrapulmonary percussive therapy QID with RT  - RVP     Stage Ia RLL adenocarcinoma lung s/p lobectomy  Stage 1a RUL adenocarinoma lung s/p SBRT   Initially diagnosed in January 2015, s/p RLL lobectomy in 2016 and s/p RUL SBRT January 2020. Follows with Dr. Leung as outpatient, on surveillance imaging with stable disease.  - Onc consulted as FYI  - Next outpt follow up with Dr. Leung 3/1/23 with repeat CT at that time    Type II Diabetes mellitus c/b neuropathy, not on long term insulin  HbA1c 9.4 1/23/23. PTA regimen is Jardiance 25mg daily and Glipizide 5mg BID. Follows with endocrine as outpatient. Hyperglycemia overnight with BG into 200s, suspect component of steroid induced hyperglycemia.  - Goal BGS while inpatient 140-180  - Medium intensity SSI  - AC and HS checks w hypoglycemia protocol ordered.   - Continue to monitor, may need to consider high intensity sliding scale insulin pending BG, steroid dose  - PTA lyrica 150mg BID for  "neuropathy     HTN  - Continue 10mg amlodipine   - Could be contributing to patient's non-pitting pedal edema. PCP to consider switching anti-HTN agent     GERD   Dysphagia  - Protonix 40mg BID     Home Meds  Pharm med rec placed to review all medications     Pedal edema  Non-pitting pedal edema, possibly secondary to amlodipine.  - Continue to monitor      Allergies  Epic alerts her chart for \"glucagon\" as contraindicated, because patient has an allergy listed to \"colon care\". Unclear why there is a contraindication. Spoke with pharmacist who notes to order glucagon for now and they will review.  - Similarly, ASA 325mg is listed as an allergy, but she takes 81mg daily. Continued for inpatient     Diet: Regular Diet Adult    DVT Prophylaxis: Enoxaparin (Lovenox) SQ  Andrade Catheter: Not present  Fluids: None  Lines: None     Cardiac Monitoring: None  Code Status: No CPR- Pre-arrest intubation OK        Disposition Plan      Expected Discharge Date: 02/22/2023                The patient's care was discussed with the Attending Physician, Dr. Gab Walden.    Radha [place]  Medical Student  Hospitalist Service, [address]  Securely message with Vocera (more info)  Text page via [facility] Paging/Directory   See signed in provider for up to date coverage information  ______________________________________________________________________    Interval History   On BiPAP overnight.     This morning, Jenn was transitioned to NC. Reported she still feels some tightness in her chest, but breathing feels improved. Endorses ongoing cough, productive. Cough appeared to worsen after starting machine (?mask) after last discharge. Denied fever. Endorsed some chills. Had been drinking water at home, but not eating too much. Denies abdominal pain, N/V. Last bowel movement yesterday. She has some pain/burning with urination, which is new. She doesn't remember coming in to the " hospital last night.     Physical Exam   Vital Signs: Temp: 97.7  F (36.5  C) Temp src: Oral BP: (!) 143/82 Pulse: 96   Resp: 20 SpO2: 97 % O2 Device: BiPAP/CPAP    Weight: 0 lbs 0 oz    Constitutional: Awake, alert, and oriented. In no acute distress. Appears chronically ill and has poor dentition.   Respiratory: Mildly increased work of breathing. Overall not using accessory respiratory muscles. Does have increased worked of breathing and dyspnea with coughing, appears to have some difficulty mobilizing secretions. On ausculation, diffuse wheezing is heard.   Cardiovascular: RRR, well perfused.   GI: Abdomen soft, non-tender, and non-distended. Normoactive bowel sounds.   Musculoskeletal: Spontaneously moves all extremities. 1+ edema bilaterally up to the level of at least the calves.   Neurologic: Alert and oriented. Cranial nerves grossly intact.     Data     I have personally reviewed the following data over the past 24 hrs:    9.0  \   14.5   / 191     145 104 10.6 /  220 (H)   4.3 27 0.48 (L) \       ALT: 15 AST: 32 AP: 98 TBILI: 0.2   ALB: 4.2 TOT PROTEIN: 7.3 LIPASE: N/A       Trop: <6 BNP: 26       Procal: 0.03 CRP: N/A Lactic Acid: 0.9; 0.8       INR:  N/A PTT:  N/A   D-dimer:  0.35 Fibrinogen:  N/A       Imaging results reviewed over the past 24 hrs:   Recent Results (from the past 24 hour(s))   XR Chest Port 1 View    Narrative    Exam: XR CHEST PORT 1 VIEW, 2/20/2023 8:22 PM    Comparison: Chest x-ray 2/3/2023, CT chest 2/4/2023    History: dyspnea    Findings:  Single portable AP view of the chest. Stable postsurgical changes of  the chest with scattered surgical clips. Trachea is midline. Cardiac  silhouette is stable. High lung volumes. Increased perihilar and  bibasilar streaky opacities. The right upper lobe spiculated nodule   visualized on CT chest 2/4/2023 is not appreciated on today's exam.  Redemonstrated chronic blunting of the right costophrenic angle. No  appreciable pneumothorax. No acute  osseous abnormality.      Impression    Impression:   1. Increased perihilar and bibasilar streaky opacities, may represent  atelectasis/edema.  2. Chronic blunted right costophrenic angle may represent  atelectasis/scarring.    I have personally reviewed the examination and initial interpretation  and I agree with the findings.    DENYS MOSLEY MD         SYSTEM ID:  [id number]

## 2023-02-21 NOTE — ED TRIAGE NOTES
BIBA  Trouble breathing 1 day  2L aty home  4L 74%  Abuterol nebs without relief  cpap and douned 100%    COPD lung cancer asthma   180/84  160/60  -110  18g LAC

## 2023-02-21 NOTE — CONSULTS
Oncology Consult:      Reason for consult:  Lung cancer history with a new growing pulmonary nodule    Cancer History is outlined below:    CANCER TYPE: Lung adenocarcinoma  STAGE: IA2 [id number] poorly diff adeno RLL, then [id number] IA2 suspected NSCLC RUL, medically inoperable      ECOG PS: 1     PD-L1: N/A  Lung panel: N/A  NGS: N/A     SUMMARY  12/24/15            PET/CT  1/13/15              CT lung bx. Adenocarcinoma  2/8/16                R thoracotomy, RLL lobectomy (Dr. Cunha). Adenocarcinoma, 1.1 cm, assoicated with atypical adenomatous hyperplasia  10/18/19            CTA for shortness of breath. New 1.3 cm RUL nodule  11/2019              PET/CT. 1.1 cm RUL hypermetabolic nodule (SUV 12.6)  12/26/19            Brain MRI negative for mets  1/14~1/28/20     SBRT to RUL nodule, 5000 cGy, every other day, 1000 cGy (Dr. Currie at [facility] [place]). No bx due to O2 dependence and too much risk  8/19/20              First visit with me   11/29/21            CT chest. New 10 mm RUL nodule  1/11/22              CT chest. New 7.2 mm RUL nodule, unchanged RUL nodules  3/31/22              CT chest. New RUL nodule from Jan 2022 7-->10 mm, new 5 mm ROBERT nodule  5/20~5/24/22     [facility] for COPD exacerbation.   6/24/22              CT chest non contrast.   8/16/22              EGD. 3 cm hiatal hernia, o/w normal  8/31/22              CT chest. 2.5 x 1.3 cm R midlung subpleural nodularity (4:98) previously 2.3 x 1.1 cm, slightly increased solid component   10/5/22              PET/CT. 2 x 1.3 cm irregular opacity posterior RUL (SUV 2.46), 2.4 x 0.8 cm linear opacity (SUV 4.76); there was bronchiectasia on prior imaging. Stable 1.1 x 0.8 cm non FDG avid RUL opacity and unchanged 4 mm posterior RUL nodule. No adenopathy. Inferior right breast uptake (SUV 6.09) likely infectious or inflammatory.     HPI: Patient presents to the ER on 220 with complaints of worsening cough and shortness of breath.  She had previously been discharged on  February 9, 2023 with COPD exacerbation.  On the day of admission she was trying Mucomyst nebulizers.  She reports that her cough worsened and shortness of breath did not resolve with rest.  She was brought to the emergency department after calling the ambulance and was subsequently admitted.    In discussions with the patient she reports no headaches or vision changes.  No sore throat or earaches.  No rhinorrhea.  She has no sick contacts.  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    She has chest tightness with her shortness of breath and cough.  There is no radiation of this pain.  The cough production is white phlegm with no blood.    She does have neuropathic pain in her feet and legs.  This is not new.    Unaided admission and evaluation in the emergency department she had a chest CT performed.  This demonstrated a pulmonary nodule that has been gradually growing for the last year from 6 mm to 1.2 cm to approximately 2 cm in size.  We are consulted for evaluation.    10 point review of systems is otherwise negative.    Past Medical History:   Diagnosis Date     Adenocarcinoma, lung (H)      Asthma      Ectopic pregnancy      Esophageal reflux      Pulmonary emphysema (H)     Very severe FEV1<30% predicted     Type II diabetes mellitus (H)      Current Facility-Administered Medications   Medication     acetaminophen (TYLENOL) tablet 650 mg     acetylcysteine (MUCOMYST) 10 % nebulizer solution 4 mL     amLODIPine (NORVASC) tablet 10 mg     aspirin EC tablet 81 mg     [START ON 2/22/2023] azithromycin (ZITHROMAX) tablet 250 mg     cetirizine (zyrTEC) tablet 10 mg     glucose gel 15-30 g    Or     dextrose 50 % injection 25-50 mL    Or     glucagon injection 1 mg     enoxaparin ANTICOAGULANT (LOVENOX) injection 40 mg     fluticasone (FLONASE) 50 MCG/ACT spray 1 spray     insulin aspart (NovoLOG) injection (RAPID ACTING)      ipratropium - albuterol 0.5 mg/2.5 mg/3 mL (DUONEB) neb solution 3 mL     lidocaine (LMX4) cream     lidocaine 1 % 0.1-1 mL     melatonin tablet 1 mg     methocarbamol (ROBAXIN) tablet 500 mg     nicotine (NICODERM CQ) 14 MG/24HR 24 hr patch 1 patch    And     nicotine Patch in Place     nicotine (NICORETTE) lozenge 4 mg     pantoprazole (PROTONIX) EC tablet 40 mg     polyethylene glycol-propylene glycol (SYSTANE ULTRA) ophthalmic solution 1 drop     [START ON 2/22/2023] predniSONE (DELTASONE) tablet 40 mg     pregabalin (LYRICA) capsule 150 mg     sodium chloride (PF) 0.9% PF flush 3 mL     sodium chloride (PF) 0.9% PF flush 3 mL     Current Outpatient Medications   Medication     acetylcysteine (MUCOMYST) 10 % nebulizer solution     albuterol (PROAIR HFA/PROVENTIL HFA/VENTOLIN HFA) 108 (90 Base) MCG/ACT inhaler     albuterol (PROVENTIL) (2.5 MG/3ML) 0.083% neb solution     alcohol swab prep pads     amLODIPine (NORVASC) 10 MG tablet     aspirin 81 MG EC tablet     cetirizine (ZYRTEC) 10 MG tablet     Continuous Blood Gluc  (FREESTYLE GIOVANNI 2 READER) JOSEPHINE     Continuous Blood Gluc Sensor (FREESTYLE GIOVANNI 14 DAY SENSOR) MISC     Continuous Blood Gluc Sensor (FREESTYLE GIOVANNI 2 SENSOR) MISC     doxycycline hyclate (VIBRA-TABS) 100 MG tablet     Easy Comfort Lancets MISC     empagliflozin (JARDIANCE) 25 MG TABS tablet     fluticasone (FLONASE) 50 MCG/ACT nasal spray     Fluticasone-Umeclidin-Vilanterol (TRELEGY ELLIPTA) 200-62.5-25 MCG/INH oral inhaler     glipiZIDE (GLUCOTROL) 5 MG tablet     glucosamine-chondroitin 500-400 MG tablet     ketotifen (ZADITOR) 0.025 % ophthalmic solution     Lancet Devices (EASY MINI EJECT LANCING DEVICE) MISC     multivitamin w/minerals (THERA-VIT-M) tablet     nicotine (NICODERM CQ) 14 MG/24HR 24 hr patch     nicotine (NICORETTE) 4 MG lozenge     nicotine (NICOTROL) 10 MG inhaler     omega-3 acid ethyl esters (LOVAZA) 1 g capsule     omeprazole (PRILOSEC) 20 MG DR capsule      polyethylene glycol-propylene glycol (SYSTANE) 0.4-0.3 % SOLN ophthalmic solution     pregabalin (LYRICA) 150 MG capsule     STATIN NOT PRESCRIBED (INTENTIONAL)     Allergies reviewed.    SH:  Former tobacco user.    FH:  Not reviewed.    PE:  BP (!) 142/68   Pulse 104   Temp 98.3  F (36.8  C) (Oral)   Resp 21   SpO2 96%   Gen: appears fatigued, using nebulizer  HEENT: no icterus  CV: tachycardic  Pulm: wheezes in lungs bilaterally  Abd: soft, nt, nd + bs  Ext: 1+ edema in LE bilaterally  Neuro: nonfocal, talking clearly and fluently      Lab Results   Component Value Date    WBC 9.0 02/21/2023    WBC 7.3 06/25/2021     Lab Results   Component Value Date    RBC 5.22 02/21/2023    RBC 4.61 06/25/2021     Lab Results   Component Value Date    HGB 14.5 02/21/2023    HGB 12.9 06/25/2021     Lab Results   Component Value Date    HCT 48.1 02/21/2023    HCT 42.3 06/25/2021     No components found for: MCT  Lab Results   Component Value Date    MCV 92 02/21/2023    MCV 92 06/25/2021     Lab Results   Component Value Date    MCH 27.8 02/21/2023    MCH 28.0 06/25/2021     Lab Results   Component Value Date    MCHC 30.1 02/21/2023    MCHC 30.5 06/25/2021     Lab Results   Component Value Date    RDW 14.2 02/21/2023    RDW 13.2 06/25/2021     Lab Results   Component Value Date     02/21/2023     06/25/2021       Recent Labs   Lab Test 02/21/23  1157 02/21/23  0817 02/21/23  0625 02/21/23  0008 02/20/23 1943   NA  --   --  145  --  142  143   POTASSIUM  --   --  4.3  --  4.3  4.7   CHLORIDE  --   --  104  --  102   CO2  --   --  27  --  28   ANIONGAP  --   --  14  --  12   * 189* 204*   < > 147*  149*   BUN  --   --  10.6  --  7.6*   CR  --   --  0.48*  --  0.50*   LUIGI  --   --  9.5  --  9.2    < > = values in this interval not displayed.     Liver Function Studies - Recent Labs   Lab Test 02/20/23 1943   PROTTOTAL 7.3   ALBUMIN 4.2   BILITOTAL 0.2   ALKPHOS 98   AST 32   ALT 15     CT Chest  reviewed personally.    EXAM: CT CHEST PULMONARY EMBOLISM W CONTRAST  LOCATION: [facility]  DATE/TIME: 2/4/2023 4:30 AM     INDICATION: SOB  COMPARISON: CT chest 01/01/2023  TECHNIQUE: CT chest pulmonary angiogram during arterial phase injection of IV contrast. Multiplanar reformats and MIP reconstructions were performed. Dose reduction techniques were used.   CONTRAST: iopamidol (ISOVUE 370) solution 69 mL        FINDINGS:  ANGIOGRAM CHEST: Pulmonary arteries are normal caliber and negative for pulmonary emboli. Thoracic aorta is negative for dissection. No CT evidence of right heart strain.     LUNGS AND PLEURA: Right lower lobectomy. Moderate emphysema. No significant change in a 2.3 x 1.5 cm spiculated nodule in the right upper lobe (series 7 image 94) with surrounding linear parenchymal opacities and subtle nodularity. Multiple tubular   opacities in the adjacent lung likely reflecting mucous plugging. No pleural effusion or pneumothorax.     MEDIASTINUM/AXILLAE: No lymphadenopathy. Normal esophagus. No significant pericardial effusion.     CORONARY ARTERY CALCIFICATION: Mild.     UPPER ABDOMEN: Normal.     MUSCULOSKELETAL: Mild degenerative changes.                                                                      IMPRESSION:  1.  No pulmonary embolism.  2.  No significant change in a 2.3 cm right upper lobe nodule suspicious for malignancy.  3.  Moderate emphysema.      A/P:   IA2 [id number] poorly diff adeno RLL, then [id number] IA2 suspected NSCLC RUL, medically inoperable now with growing pulm nodule, admitted with COPD exacerbation.    1. Lung cancer - pt has a growing pulmonary nodule.  She has previously had a lobectomy for a stage 1 lung cancer.  She had SBRT to a second area. She now has a nodule increasing in size concerning for progressive lung cancer.  I discussed with the patient that the upper part of her liver and bones on imaging appear to be normal  without evidence of metastatic disease.  That being said, the lung nodule itself is growing.  This typically can be retreated with surgery or SBRT.  At this time, she has follow up with Dr Leung next week.  I would recommend she see her for ongoing management of this nodule.      2. COPD exacerbation - this is not related to her growing pulm nodule.  Appreciate input from hospital medicine and pulmonary.  Agree with nebulizers.    From an oncologic standpoint, we will follow peripherally.  Please consult again with additional questions.    Nikkie Spencer MD

## 2023-02-21 NOTE — CONSULTS
[facility] Pulmonology Consultation    Jenn Aparicio  MRN: [id number]  : 1955    Date of Admission: 2023  Date of Service: 23    Reason for consult:  COPD exacerbation, hypercapnic hypoxic respiratory failure   Requesting physician:  Dr. Boy Steward     Assessment:  Asthma-COPD overlap  Acute on chronic hypercarbic and hypoxic respiratory failure    Patient admitted 22 for the third time in 2 months with acute on chronic hypercapnic hypoxic respiratory failure. Patient carries diagnosis of COPD, with 2023 CT  demonstrating emphysematous lung disease. She has a component of asthma in addition to COPD, which is c/w her 2020 PFTs and eosinophilia on most recent CBC. Her current presentation could be due to an asthma exacerbation triggered by the dust in her new build apartment, or cleaning products she was using just prior to her dyspneic episode bringing her to the hospital. The patient is currently prescribed Trelegy Ellipta and albuterol rescue inhaler, so any underlying asthma is already being treated. Less likely this was triggered by infectious etiology as she has been afebrile with normal white count, and no radiographic findings suggestive of infection. Will follow with sputum culture and respiratory viral panel for further workup. Speaking with patient, it appears she had some difficulty administering the nebulizer treatments, and using her [id number], as she had not been using it while plugged in and it was subsequently dying. She is well known to the chronic pulmonary disease respiratory therapy team, who will see her for teaching. In terms of her inhalers, she has been evaluated by RT for this in the past, and has had good technique, so she can continue use of inhalers at home. She is currently scheduled to have RT see her at home  who can also review medications, equipment, and technique. She is currently using vest for airway  clearance.    Recommendations:  - Collect sputum for culture, RVP  - check alpha-1-anti-trypsin  - Steroid taper:    - Complete 7 days of prednisone 40 mg, then 7 days of prednisone 30 mg, then 7 days of prednisone 20 mg    - Mucomyst + albuterol BID  - duonebs q6h while awake  - Start azithromycin 250 mg daily indefinitely given frequent exacerbations  - Please check if Roflumilast is covered by insurance, would consider adding as well given daily sputum production  - appreciate chronic COPD RT input  - consider transition to full nebulizer therapy at home, pending further discussions with COPD RT  - further titration of Life 2000 ventilator at home  - Sleep study outpatient   - Follow up with outpatient pulmonology, with repeat PFTs    Chief complaint:  COPD exacerbation acute on chronic hypercapnic hypoxic respiratory failure     HPI:    Patient admitted 2/20 for the third time in 2 months for acute on chronic respiratory failure 2/2 COPD exacerbation. Was last discharged 2/9 with mucomyst and 5 days of steroids, which she completed one day of, a life 2000 ventilator system, and vest treatment. Upon discharge patient continued to feel dyspneic with movement, initially somewhat improved by mucomyst nebulizer treatments and coughing sputum. She also had been using her albuterol and trelegy inhalers. She states the ventilator only stays charged for few hours at a time then dies, and she goes without supplemental O2 while it charges when this happens. She had been wearing it throughout the day and when sleeping. Patient notes that she is currently waiting for the full vest to arrive at her house and has since received a band for chest physiotherapy, which she has been using.     Patient moved into a new build apartment 3 months ago, due to mold and water damage in her former apartment. The day of her admission she notes she was unpacking boxes, some of which contained items with mold, as well as cleaning with [employer]. She then became increasingly dyspneic and fatigue without improvement following nebulizer treatments, and inhalers. She was hypoxic to the 70s when EMS got to her per chart review, improved after BIPAP in the ED. She received methylprednisolone 125 mg in the ED Ceftriaxone + Azithromycin, and a second dose of methylprednisolone 125 mg on admission. Currently, patient is feeling dyspneic at rest, chest tightness, fatigue, and head ache. She also notes some burning with urination but no hematuria. Denies abdominal pain, and diarrhea. She feels like she has some lower extremity edema.     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    Medical history:    Past Medical History:   Diagnosis Date     Adenocarcinoma, lung (H)      Asthma      Ectopic pregnancy      Esophageal reflux      Pulmonary emphysema (H)     Very severe FEV1<30% predicted     Type II diabetes mellitus (H)      Surgical history:    Past Surgical History:   Procedure Laterality Date     2/8/16                R thoracotomy, RLL lobectomy (Dr. Cunha). Adenocarcinoma, 1.1 cm, assoicated with atypical adenomatous hyperplasia Right 02/08/2016    R thoracotomy, RLL lobectomy (Dr. Cunha). Adenocarcinoma, 1.1 cm, assoicated with atypical adenomatous hyperplasia     ESOPHAGOSCOPY, GASTROSCOPY, DUODENOSCOPY (EGD), COMBINED N/A 8/16/2022    Procedure: ESOPHAGOGASTRODUODENOSCOPY (EGD);  Surgeon: Travis Briseno MD;  Location:   GI     ORTHOPEDIC SURGERY       Family history: I have reviewed family history in EMR.  Family History   Problem Relation Age of Onset     Lung Cancer Sister      Depression Daughter      Alcohol/Drug Other         self     Diabetes Other         self     Thyroid Disease Other         self     Asthma Other         self       Allergies:    Allergies   Allergen Reactions     Interferons Dermatitis     Penicillins Hives     Aspirin      325mg      Colon Care      Medications:    Current Facility-Administered Medications   Medication     acetaminophen (TYLENOL) tablet 650 mg     acetylcysteine (MUCOMYST) 10 % nebulizer solution 4 mL     amLODIPine (NORVASC) tablet 10 mg     [Held by provider] aspirin EC tablet 81 mg     azithromycin (ZITHROMAX) tablet 250 mg     cetirizine (zyrTEC) tablet 10 mg     glucose gel 15-30 g    Or     dextrose 50 % injection 25-50 mL    Or     glucagon injection 1 mg     enoxaparin ANTICOAGULANT (LOVENOX) injection 40 mg     fluticasone (FLONASE) 50 MCG/ACT spray 1 spray     insulin aspart (NovoLOG) injection (RAPID ACTING)     ipratropium - albuterol 0.5 mg/2.5 mg/3 mL (DUONEB) neb solution 3 mL     lidocaine (LMX4) cream     lidocaine 1 % 0.1-1 mL     melatonin tablet 1 mg     methocarbamol (ROBAXIN) tablet 500 mg     nicotine (NICODERM CQ) 14 MG/24HR 24 hr patch 1 patch    And     nicotine Patch in Place     nicotine (NICORETTE) lozenge 4 mg     pantoprazole (PROTONIX) EC tablet 40 mg     polyethylene glycol-propylene glycol (SYSTANE ULTRA) ophthalmic solution 1 drop     [START ON 2/22/2023] predniSONE (DELTASONE) tablet 40 mg     pregabalin (LYRICA) capsule 150 mg     sodium chloride (PF) 0.9% PF flush 3 mL     sodium chloride (PF) 0.9% PF flush 3 mL     Current Outpatient Medications   Medication Sig     acetylcysteine (MUCOMYST) 10 % nebulizer solution Inhale 4 mLs into the lungs 4 times daily for 30 days     albuterol (PROAIR HFA/PROVENTIL HFA/VENTOLIN HFA) 108 (90 Base) MCG/ACT inhaler  INHALE 1 OR 2 PUFFS BY MOUTH EVERY 4 HOURS AS NEEDED     albuterol (PROVENTIL) (2.5 MG/3ML) 0.083% neb solution Take 1 vial (2.5 mg) by nebulization 4 times daily for 30 days     alcohol swab prep pads Use to swab area of injection/xander as directed.     amLODIPine (NORVASC) 10 MG tablet Take 1 tablet (10 mg) by mouth daily     aspirin 81 MG EC tablet Take 1 tablet (81 mg) by mouth daily     cetirizine (ZYRTEC) 10 MG tablet Take 1 tablet (10 mg) by mouth daily     Continuous Blood Gluc  (FREESTYLE GIOVANNI 2 READER) JOSEPHINE 1 each daily For continuous glucose monitoring     Continuous Blood Gluc Sensor (FREESTYLE GIOVANNI 14 DAY SENSOR) MISC Change every 14 days.     Continuous Blood Gluc Sensor (FREESTYLE GIOVANNI 2 SENSOR) MISC 1 each every 14 days For use with Freestyle Giovanni 2  for continuous monitioring of blood glucose levels. Replace sensor every 14 days.     doxycycline hyclate (VIBRA-TABS) 100 MG tablet Take 1 tablet (100 mg) by mouth 2 times daily     Easy Comfort Lancets MISC      empagliflozin (JARDIANCE) 25 MG TABS tablet Take 1 tablet (25 mg) by mouth daily     fluticasone (FLONASE) 50 MCG/ACT nasal spray Spray 1 spray into both nostrils daily Use at night before bed     Fluticasone-Umeclidin-Vilanterol (TRELEGY ELLIPTA) 200-62.5-25 MCG/INH oral inhaler Inhale 1 puff into the lungs daily     glipiZIDE (GLUCOTROL) 5 MG tablet Take 1 tablet (5 mg) by mouth 2 times daily (before meals)     glucosamine-chondroitin 500-400 MG tablet Take 1 tablet by mouth daily     ketotifen (ZADITOR) 0.025 % ophthalmic solution Place 1 drop into both eyes daily     Lancet Devices (EASY MINI EJECT LANCING DEVICE) MISC      multivitamin w/minerals (THERA-VIT-M) tablet Take 1 tablet by mouth daily     nicotine (NICODERM CQ) 14 MG/24HR 24 hr patch Place 1 patch onto the skin daily for 30 days     nicotine (NICORETTE) 4 MG lozenge Place 1 lozenge (4 mg) inside cheek every hour as needed for smoking cessation     nicotine  (NICOTROL) 10 MG inhaler Use 1 cartridge as needed for urge to smoke by puffing over course of 20min.  Use 6-16 cart/day; reduce number of cart/day over 6-12 weeks.     omega-3 acid ethyl esters (LOVAZA) 1 g capsule Take 2 capsules (2 g) by mouth 2 times daily Please call [phone] to schedule follow up visit for more refills.     omeprazole (PRILOSEC) 20 MG DR capsule Take 1 capsule (20 mg) by mouth 2 times daily     polyethylene glycol-propylene glycol (SYSTANE) 0.4-0.3 % SOLN ophthalmic solution Place 1 drop into both eyes 4 times daily     pregabalin (LYRICA) 150 MG capsule Take 1 capsule (150 mg) by mouth 2 times daily     STATIN NOT PRESCRIBED (INTENTIONAL) Please choose reason not prescribed from choices below.     Objective:    BP (!) 155/85   Pulse 96   Temp 97.7  F (36.5  C) (Oral)   Resp 22   SpO2 98%     Gen: in no acute distress, sitting upright in bed  HEENT: Atraumatic, normocephalic   Pulm: no increased work of breathing, clear upper lobes, absent breath sounds in RLL (s/p RLL lobectomy), some left basilar crackles.   GI: soft, not distended, non-tender   MSK: no gross deformities, trace lower extremity edema   Integ: no rashes or lesions appreciated  Neuro: speech clear, hearing grossly intact   Psych: calm, appropriate    Data:    I have personally reviewed laboratory data.  I have personally reviewed imaging available.     2/20/2023 XR Chest  Impression:   1. Increased perihilar and bibasilar streaky opacities, may represent  atelectasis/edema.  2. Chronic blunted right costophrenic angle may represent  Atelectasis/scarring.    Most recent PFTs:   Component      Latest Ref Rng & Units 12/15/2020   FVC-Pred      L 2.97   FVC-Pre      L 1.32   FVC-%Pred-Pre      % 44   FEV1-Pre      L 0.57   FEV1-%Pred-Pre      % 24   FEV1FVC-Pred      % 79   FEV1FVC-Pre      % 44   FEFMax-Pred      L/sec 6.31   FEFMax-Pre      L/sec 1.05   FEFMax-%Pred-Pre      % 16   FEF2575-Pred      L/sec 2.04    FEF2575-Pre      L/sec 0.24   MOL8855-%Pred-Pre      % 11   FEF2575-Post      L/sec 0.39   MVS0072-%Pred-Post      % 19   ExpTime-Pre      sec 6.34   FIFMax-Pre      L/sec 1.18   VC-Pred      L 3.42   VC-Pre      L 1.57   VC-%Pred-Pre      % 46   IC-Pred      L 2.91   IC-Pre      L 0.89   IC-%Pred-Pre      % 30   ERV-Pred      L 0.51   ERV-Pre      L 0.69   ERV-%Pred-Pre      % 133   FEV1FEV6-Pred      % 80   FEV1FEV6-Pre      % 44   FRCPleth-Pred      L 2.82   FRCPleth-Pre      L 7.89   FRCPleth-%Pred-Pre      % 279   RVPleth-Pred      L 2.07   RVPleth-Pre      L 7.21   RVPleth-%Pred-Pre      % 347   TLCPleth-Pred      L 5.27   TLCPleth-Pre      L 8.78   TLCPleth-%Pred-Pre      % 166   DLCOunc-Pred      ml/min/mmHg 21.20   DLCOunc-Pre      ml/min/mmHg 8.15   DLCOunc-%Pred-Pre      % 38   DLCOcor-Pre      ml/min/mmHg 7.85   DLCOcor-%Pred-Pre      % 37   VA-Pre      L 3.05   VA-%Pred-Pre      % 59   FEV1SVC-Pred      % 68   FEV1SVC-Pre      % 36     Iza Wilson, MS4    Patient discussed and seen with Dr. Brad Rodriguez.     Resident/Fellow Attestation   I, Micaela Siddiqi MD, was present with the medical/ANN student who participated in the service and in the documentation of the note.  I have verified the history and personally performed the physical exam and medical decision making.  I agree with the assessment and plan of care as documented in the note.      Multiple admissions for COPD/asthma overlap syndrome. Trigger this time seems to be cleaning her apartment, ? Dust vs strong scent from . 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 Will check alpha-1-antitrypsin. IN regards to oxygen, she has been using [id number] ventilator 24/7 since she got it, but notes it runs out of battery, and then she is without oxygen. She has nebulizers at  home, but has a hard time getting them open, could benefit from optimization of this regimen. She does use a trilogy inhaler, but ? If she would benefit more from nebulizers given her severe lung disease. She has a chronic productive cough, and utilizes a belt-therapy while awaiting vest to get delivered, but perhaps metaneb would be more beneficial for her.Finally given multiple exacerbations, will start her on daily azithromycin therapy. And, consider roflumilast given chronic bronchitis phenotype.    Micaela Siddiqi, DO  PGY-5  Date of Service (when I saw the patient): 02/21/23

## 2023-02-21 NOTE — ED NOTES
Bed: ED20  Expected date:   Expected time:   Means of arrival:   Comments:  N103  COPD exacerbation  On CPAP Yellow

## 2023-02-21 NOTE — H&P
[facility]    History and Physical - Hospitalist Service, GOLD TEAM        Date of Admission:  2/20/2023    Assessment & Plan      Jenn Aparicio is a 67 year old female admitted on 2/20/2023 for acute hypoxic hypercapneic respiratory failure most likely 2/2 COPD exacerbation    #COPD exacerbation  Acute hypoxic hypercapneic respiratory failure  - At baseline on 3L O2, FEV1<24% from PFT's in 12/2020. Reports she can usually walk 1 block w/o dyspnea (this was as of early Jan). She was walking inside her house comfortably at the time of most recent discharge and able to walk about half a block w/o dyspnea. Now dyspnea at rest  - Patient has had 2 recent admissions for COPD exacerbation. 1/1-1/4/23 when she was dc'ed on steroids and Abx. Readmitted 2/3-2/9 for COPD exac, this time also found to have some mucous plugging, discharged on steroids and mucomyst  - On EMS arrival 2/20, was on 4L and satting in 70s and was placed on Bipap. Initial VBG in ER, pH was 7.22 w pCO2 of 92. VBG improving to 7.27/67 after 2 hours of Bipap  - On my interview, she was resting comfortably in bed, appears fatigued, but no tachypnea or use of accessory muscles. She is alert, oriented x 3 and receiving nebulizers. Reports improvement in breathing since arrival, however, continues to have chest tightness  - In terms of etiology for the COPD exac- no changes to medication, other than stopping her steroid burst prescribed on discharge, on 2/14 (symptoms started 2/18). Negative for Flu, RSV and COVID. D-dimer is normal, so unlikely PE. Normal trop and EKG. No concern for ACS at this time  No evidence of PNA on CXR- will check Procal. CXR does note atelectasis vs pulmonary edema- check BNP. If elevated, will plan for an ECHO (last ECHO was 09/22- EF 55-60%),  ?2/2 env exposures to cleaning products? Does not smoke, no 2nd hand exposure per report. Elevated eosinophils on the diff  Could be due to  this  - In the ED, she received 125 methylpred IV, Azithro 500mg IV and Ceftriaxone 2g IV  - Received one additional dose of 125mg IV methylpred by me given not much improvement in her symptoms- ongoing chest tightness, not too much air movement on auscultation and persistent elevated pCO2    - Tentatively plan to continue PO Prednisone 40mg for 5 days and consider a taper upon discharge to prevent any rebound exacerbation. However, if persistent chest tightness and poor air exchange, might need IV steroids Q8H  - Continue PO Azithro x 5 days as an adjunct in COPD Rx (Qtc 441)  - Hold off on further doses of CTX at this time  - Continue duoneb Q4H (SEAN + Anticholinergic) and Acetylcysteine q4h as well  - Repeat VBG at 6am to assess if we can pause Bipap    This is the 3rd COPD exac for patient in the last 2 months. Before that, she had one admission in May 2022 and one in June 2021.   Patient is very knowledgeable about her medical Hx and states she had a supply of steroids at home and she would take it at least once a month for an exac in '21 and '22, but did not need hospital stays like she has this year  Upon discharge last time, she was prescribed vest therapy as it was felt that her intrapulmonary percussive therapy was ineffective. Patient reports this has not yet been started. Has been using [id number], however mostly uses NC as she was desatting more on the [id number].  - Inpatient COPD c/s placed  - Pulmonary consultation to identify factors to prevent readmission. She did recently have a CT chest on 2/4/23. 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  Since then she has been on surveillance PET  "CT scan and her cancer has been stable. She is being followed by - was last seen in Dec 2021.   - CT findings from Jan '23 communicated by admitting team to  via Epic message  - Patient seen by Onc service who reviewed the CT scan from 2/3/2023 in the prior admission. It was determined that the cancer has been stable.  Recs given for next surveillance CT scan in 4 weeks on 3/1/2023 and follow-up with Dr. Leung on 3/1/2023.     HTN  -Continue 10mg amlodipine - possibly this is causing/worsening patient's non-pitting pedal edema. PCP to consider switching anti-HTN agent     Hyperglycemia due to Type II DM c/b neuropathy  - At home, she is on Jardiance 10mg daily and Glipizide 5mg BID. Followed by endocrine.  - Plan to keep on sliding scale insulin while inpatient and NPO due to Bipap. Check BGS Q4H while NPO  - AC and HS checks w hypoglycemia protocol ordered.   - Goal BGS while inpatient 140-180  - Continue home lyrica 150mg BID for neuropathy    #GERD   Dysphagia - Continue PTA omeprazole 20mg BID    Home Meds  Pharm med rec placed to review all medications    Pedal edema  - Non-pitting pedal edema. ?2/2 amlodipine.    Allergies  Epic alerts her chart for \"glucagon\" as contraindicated, because patient has an allergy listed to \"colon care\". Unclear why there is a contraindication. Spoke with pharmacist who notes to order glucagon for now and they will review. Primary team to follow-up in AM  - Similarly, ASA 325mg is listed as an allergy, but she takes 81mg daily. Continued for inpatient         Diet:  NPO while on Bipap- Q4H BGS while NPO. Spoke with RN re this  DVT Prophylaxis: Enoxaparin (Lovenox) SQ  Andrade Catheter: Not present  Lines: None     Cardiac Monitoring: None  Code Status:  Patient confirmed on admission that she is NO CPR, but OK with elective intubation if needed for her resp status decomepnsation    Clinically Significant Risk Factors Present on Admission                    # " "Non-Invasive mechanical ventilation: current O2 Device: BiPAP/CPAP  # Acute hypoxic respiratory failure: continue supplemental O2 as needed    # DMII: A1C = 9.4 % (Ref range: <5.7 %) within past 3 months    # Obesity: Estimated body mass index is 32.93 kg/m  as calculated from the following:    Height as of 2/4/23: 1.676 m (5' 6\").    Weight as of 2/8/23: 92.5 kg (204 lb).           Disposition Plan      Expected Discharge Date: 02/22/2023                  Boy Steward MD  Hospitalist Service, [facility]  Securely message with [employer] (more info)  Text page via [facility] Paging/Directory   See signed in provider for up to date coverage information    ______________________________________________________________________    Chief Complaint   Dyspnea    History is obtained from the patient    History of Present Illness   Jenn Aparicio is a 67 year old female who presents to the ER via EMS for worsening cough and dyspnea over the past 2-3 days PTA.    Patient reports that she was getting better gradually since her discharge 2/9/23. However, over the weekend, had worsening dyspnea and cough. She initially thought this was due to using mucomyst as the neb was causing her to cough and produce a lot of phlegm, and also gave her a HA every time she used it However, on day of admission, she had dyspnea at rest not relieved with nebulizers and called for EMS  Of note, patient states she was cleaning her house over the weekend, and suspects the cleaning products could have triggered her COPD.    She notes an increase in cough and phlegm production since discharge, however, attributes it to use of mucomyst.     Otherwise, ROS neg for persistent HA, vision changes, sore throat, ear ache, dental pain, runny nose, fever, body aches, chills, sick contacts, abdominal pain, NV, no diarrhea or constipation, no bloody urine, dysuria.    She does note chest tightness that " has been ongoing along with the dyspnea and cough. Pain feels like muscle spasms, is central chest in location, worse with cough and deep breath, worse also with palpation. No chest pain or pain radiating to shoulder/arm. No assc lightheadedness, pain not worse with exertion    Her cough is productive with white phlegm, no change in color, and no blood    Pedal edema has been present for several months and patient attributes it to her diabetic neuropathy    Her only pain is her neuropathic pain in her feet and legs    Past Medical History    Past Medical History:   Diagnosis Date     Adenocarcinoma, lung (H)      Asthma      Ectopic pregnancy      Esophageal reflux      Pulmonary emphysema (H)     Very severe FEV1<30% predicted     Type II diabetes mellitus (H)        Past Surgical History   Past Surgical History:   Procedure Laterality Date     16                R thoracotomy, RLL lobectomy (Dr. Cunha). Adenocarcinoma, 1.1 cm, assoicated with atypical adenomatous hyperplasia Right 2016    R thoracotomy, RLL lobectomy (Dr. Cunha). Adenocarcinoma, 1.1 cm, assoicated with atypical adenomatous hyperplasia     ESOPHAGOSCOPY, GASTROSCOPY, DUODENOSCOPY (EGD), COMBINED N/A 2022    Procedure: ESOPHAGOGASTRODUODENOSCOPY (EGD);  Surgeon: Travis Briseno MD;  Location:  GI     ORTHOPEDIC SURGERY         Prior to Admission Medications   Prior to Admission Medications   Prescriptions Last Dose Informant Patient Reported? Taking?   Continuous Blood Gluc  (FREESTYLE GIOVANNI 2 READER) JOSEPHINE  Self No No   Si each daily For continuous glucose monitoring   Continuous Blood Gluc Sensor (FREESTYLE GIOVANNI 14 DAY SENSOR) MISC  Self No No   Sig: Change every 14 days.   Continuous Blood Gluc Sensor (FREESTYLE GIOVANNI 2 SENSOR) MISC  Self No No   Si each every 14 days For use with Freestyle Giovanni 2  for continuous monitioring of blood glucose levels. Replace sensor every 14 days.   Easy Comfort Lancets  MISC  Self Yes No   Fluticasone-Umeclidin-Vilanterol (TRELEGY ELLIPTA) 200-62.5-25 MCG/INH oral inhaler  Self No No   Sig: Inhale 1 puff into the lungs daily   Lancet Devices (EASY MINI EJECT LANCING DEVICE) MISC  Self Yes No   STATIN NOT PRESCRIBED (INTENTIONAL)  Self No No   Sig: Please choose reason not prescribed from choices below.   acetylcysteine (MUCOMYST) 10 % nebulizer solution   No No   Sig: Inhale 4 mLs into the lungs 4 times daily for 30 days   albuterol (PROAIR HFA/PROVENTIL HFA/VENTOLIN HFA) 108 (90 Base) MCG/ACT inhaler  Self No No   Sig: INHALE 1 OR 2 PUFFS BY MOUTH EVERY 4 HOURS AS NEEDED   albuterol (PROVENTIL) (2.5 MG/3ML) 0.083% neb solution   No No   Sig: Take 1 vial (2.5 mg) by nebulization 4 times daily for 30 days   alcohol swab prep pads  Self No No   Sig: Use to swab area of injection/xander as directed.   amLODIPine (NORVASC) 10 MG tablet  Self No No   Sig: Take 1 tablet (10 mg) by mouth daily   aspirin 81 MG EC tablet  Self No No   Sig: Take 1 tablet (81 mg) by mouth daily   cetirizine (ZYRTEC) 10 MG tablet  Self No No   Sig: Take 1 tablet (10 mg) by mouth daily   doxycycline hyclate (VIBRA-TABS) 100 MG tablet  Self No No   Sig: Take 1 tablet (100 mg) by mouth 2 times daily   empagliflozin (JARDIANCE) 25 MG TABS tablet  Self No No   Sig: Take 1 tablet (25 mg) by mouth daily   fluticasone (FLONASE) 50 MCG/ACT nasal spray  Self No No   Sig: Spray 1 spray into both nostrils daily Use at night before bed   glipiZIDE (GLUCOTROL) 5 MG tablet  Self No No   Sig: Take 1 tablet (5 mg) by mouth 2 times daily (before meals)   glucosamine-chondroitin 500-400 MG tablet  Self No No   Sig: Take 1 tablet by mouth daily   ketotifen (ZADITOR) 0.025 % ophthalmic solution  Self Yes No   Sig: Place 1 drop into both eyes daily   multivitamin w/minerals (THERA-VIT-M) tablet  Self No No   Sig: Take 1 tablet by mouth daily   nicotine (NICODERM CQ) 14 MG/24HR 24 hr patch   No No   Sig: Place 1 patch onto the skin  daily for 30 days   nicotine (NICORETTE) 4 MG lozenge   No No   Sig: Place 1 lozenge (4 mg) inside cheek every hour as needed for smoking cessation   nicotine (NICOTROL) 10 MG inhaler   No No   Sig: Use 1 cartridge as needed for urge to smoke by puffing over course of 20min.  Use 6-16 cart/day; reduce number of cart/day over 6-12 weeks.   omega-3 acid ethyl esters (LOVAZA) 1 g capsule  Self No No   Sig: Take 2 capsules (2 g) by mouth 2 times daily Please call [phone] to schedule follow up visit for more refills.   omeprazole (PRILOSEC) 20 MG DR capsule  Self No No   Sig: Take 1 capsule (20 mg) by mouth 2 times daily   polyethylene glycol-propylene glycol (SYSTANE) 0.4-0.3 % SOLN ophthalmic solution  Self No No   Sig: Place 1 drop into both eyes 4 times daily   pregabalin (LYRICA) 150 MG capsule  Self No No   Sig: Take 1 capsule (150 mg) by mouth 2 times daily      Facility-Administered Medications: None        Review of Systems    The 10 point Review of Systems is negative other than noted in the HPI or here.     Social History   I have reviewed this patient's social history and updated it with pertinent information if needed.  Social History     Tobacco Use     Smoking status: Every Day     Packs/day: 0.25     Types: Cigarettes     Smokeless tobacco: Former     Tobacco comments:     01/02/2023 Patient using 14 mg patch, wants to have prescription for Nicotrol inhaler, took workbook   Substance Use Topics     Alcohol use: Yes     Comment: sometime     Drug use: No        Physical Exam   Vital Signs: Temp: 97.7  F (36.5  C) Temp src: Oral BP: (!) 159/82 Pulse: 101   Resp: 20 SpO2: 99 % O2 Device: BiPAP/CPAP    Weight: 0 lbs 0 oz    General Appearance: Alert, well appearing, and in no acute distress  Mental Status: Oriented to person, place, and time  HEENT: No pallor or icterus. Pupils equal and reactive, extraocular eye movements intact.   Chest: Poor air entry bilaterally, no wheezes heard  Heart: Normal S1,  S2. No murmurs, rubs, clicks or gallops. Normal rate, regular rhythm  Abdomen: Soft, nontender, nondistended, no masses or organomegaly, Bowel sounds heard  Neurological: Alert, oriented, normal speech, CNS: CN 2-12 intact, Strength bilaterally intact in UE and LE 5/5,  Skin and Extremities: Peripheral pulses normal, non pitting pedal edema bilateral +     Medical Decision Making       90 MINUTES SPENT BY ME on the date of service doing chart review, history, exam, documentation & further activities per the note.      Data     I have personally reviewed the following data over the past 24 hrs:    9.5  \   14.2; 16.0 (H)   / 188     142; 143 102 7.6 (L) /  219 (H)   4.7; 4.3 28 0.50 (L) \       ALT: 15 AST: 32 AP: 98 TBILI: 0.2   ALB: 4.2 TOT PROTEIN: 7.3 LIPASE: N/A       Trop: <6 BNP: 26       Procal: 0.03 CRP: N/A Lactic Acid: 0.9; 0.8       INR:  N/A PTT:  N/A   D-dimer:  0.35 Fibrinogen:  N/A       Imaging results reviewed over the past 24 hrs:   Recent Results (from the past 24 hour(s))   XR Chest Port 1 View    Narrative    Exam: XR CHEST PORT 1 VIEW, 2/20/2023 8:22 PM    Comparison: Chest x-ray 2/3/2023, CT chest 2/4/2023    History: dyspnea    Findings:  Single portable AP view of the chest. Stable postsurgical changes of  the chest with scattered surgical clips. Trachea is midline. Cardiac  silhouette is stable. High lung volumes. Increased perihilar and  bibasilar streaky opacities. The right upper lobe spiculated nodule   visualized on CT chest 2/4/2023 is not appreciated on today's exam.  Redemonstrated chronic blunting of the right costophrenic angle. No  appreciable pneumothorax. No acute osseous abnormality.      Impression    Impression:   1. Increased perihilar and bibasilar streaky opacities, may represent  atelectasis/edema.  2. Chronic blunted right costophrenic angle may represent  atelectasis/scarring.    I have personally reviewed the examination and initial interpretation  and I agree with  the findings.    DENYS MOSLEY MD         SYSTEM ID:  [id number]

## 2023-02-22 ENCOUNTER — APPOINTMENT (OUTPATIENT)
Dept: OCCUPATIONAL THERAPY | Facility: CLINIC | Age: 68
DRG: 189 | End: 2023-02-22
Payer: COMMERCIAL

## 2023-02-22 ENCOUNTER — DOCUMENTATION ONLY (OUTPATIENT)
Dept: HOME HEALTH SERVICES | Facility: CLINIC | Age: 68
End: 2023-02-22
Payer: COMMERCIAL

## 2023-02-22 LAB
ANION GAP SERPL CALCULATED.3IONS-SCNC: 11 MMOL/L (ref 7–15)
BASOPHILS # BLD AUTO: 0 10E3/UL (ref 0–0.2)
BASOPHILS NFR BLD AUTO: 0 %
BUN SERPL-MCNC: 16.4 MG/DL (ref 8–23)
CA-I BLD-MCNC: 4.5 MG/DL (ref 4.4–5.2)
CALCIUM SERPL-MCNC: 8.6 MG/DL (ref 8.8–10.2)
CHLORIDE SERPL-SCNC: 105 MMOL/L (ref 98–107)
CREAT SERPL-MCNC: 0.54 MG/DL (ref 0.51–0.95)
DEPRECATED HCO3 PLAS-SCNC: 28 MMOL/L (ref 22–29)
EOSINOPHIL # BLD AUTO: 0.1 10E3/UL (ref 0–0.7)
EOSINOPHIL NFR BLD AUTO: 1 %
ERYTHROCYTE [DISTWIDTH] IN BLOOD BY AUTOMATED COUNT: 14.5 % (ref 10–15)
GFR SERPL CREATININE-BSD FRML MDRD: >90 ML/MIN/1.73M2
GLUCOSE BLDC GLUCOMTR-MCNC: 143 MG/DL (ref 70–99)
GLUCOSE BLDC GLUCOMTR-MCNC: 169 MG/DL (ref 70–99)
GLUCOSE BLDC GLUCOMTR-MCNC: 204 MG/DL (ref 70–99)
GLUCOSE BLDC GLUCOMTR-MCNC: 294 MG/DL (ref 70–99)
GLUCOSE BLDC GLUCOMTR-MCNC: 315 MG/DL (ref 70–99)
GLUCOSE SERPL-MCNC: 161 MG/DL (ref 70–99)
HCT VFR BLD AUTO: 41.4 % (ref 35–47)
HGB BLD-MCNC: 12.4 G/DL (ref 11.7–15.7)
HOLD SPECIMEN: NORMAL
IMM GRANULOCYTES # BLD: 0 10E3/UL
IMM GRANULOCYTES NFR BLD: 0 %
LYMPHOCYTES # BLD AUTO: 2.2 10E3/UL (ref 0.8–5.3)
LYMPHOCYTES NFR BLD AUTO: 20 %
MCH RBC QN AUTO: 27.6 PG (ref 26.5–33)
MCHC RBC AUTO-ENTMCNC: 30 G/DL (ref 31.5–36.5)
MCV RBC AUTO: 92 FL (ref 78–100)
MONOCYTES # BLD AUTO: 0.6 10E3/UL (ref 0–1.3)
MONOCYTES NFR BLD AUTO: 6 %
NEUTROPHILS # BLD AUTO: 7.8 10E3/UL (ref 1.6–8.3)
NEUTROPHILS NFR BLD AUTO: 73 %
NRBC # BLD AUTO: 0 10E3/UL
NRBC BLD AUTO-RTO: 0 /100
PLATELET # BLD AUTO: 164 10E3/UL (ref 150–450)
POTASSIUM SERPL-SCNC: 4.2 MMOL/L (ref 3.4–5.3)
RBC # BLD AUTO: 4.49 10E6/UL (ref 3.8–5.2)
SODIUM SERPL-SCNC: 144 MMOL/L (ref 136–145)
WBC # BLD AUTO: 10.8 10E3/UL (ref 4–11)

## 2023-02-22 PROCEDURE — 82785 ASSAY OF IGE: CPT | Performed by: STUDENT IN AN ORGANIZED HEALTH CARE EDUCATION/TRAINING PROGRAM

## 2023-02-22 PROCEDURE — 250N000013 HC RX MED GY IP 250 OP 250 PS 637: Performed by: HOSPITALIST

## 2023-02-22 PROCEDURE — 36415 COLL VENOUS BLD VENIPUNCTURE: CPT | Performed by: INTERNAL MEDICINE

## 2023-02-22 PROCEDURE — 94660 CPAP INITIATION&MGMT: CPT

## 2023-02-22 PROCEDURE — 250N000012 HC RX MED GY IP 250 OP 636 PS 637: Performed by: INTERNAL MEDICINE

## 2023-02-22 PROCEDURE — 97530 THERAPEUTIC ACTIVITIES: CPT | Mod: GO

## 2023-02-22 PROCEDURE — 97535 SELF CARE MNGMENT TRAINING: CPT | Mod: GO

## 2023-02-22 PROCEDURE — 999N000033 HC STATISTIC CHRONIC PULMONARY DISEASE SPECIALIST

## 2023-02-22 PROCEDURE — 250N000009 HC RX 250: Performed by: HOSPITALIST

## 2023-02-22 PROCEDURE — 94640 AIRWAY INHALATION TREATMENT: CPT

## 2023-02-22 PROCEDURE — 94799 UNLISTED PULMONARY SVC/PX: CPT

## 2023-02-22 PROCEDURE — 250N000013 HC RX MED GY IP 250 OP 250 PS 637: Performed by: INTERNAL MEDICINE

## 2023-02-22 PROCEDURE — 250N000013 HC RX MED GY IP 250 OP 250 PS 637: Performed by: PHYSICIAN ASSISTANT

## 2023-02-22 PROCEDURE — 999N000032 HC STATISTIC CHRONIC DISEASE SPECIALIST RT CONSULT

## 2023-02-22 PROCEDURE — 250N000011 HC RX IP 250 OP 636: Performed by: HOSPITALIST

## 2023-02-22 PROCEDURE — 120N000002 HC R&B MED SURG/OB UMMC

## 2023-02-22 PROCEDURE — 85025 COMPLETE CBC W/AUTO DIFF WBC: CPT | Performed by: INTERNAL MEDICINE

## 2023-02-22 PROCEDURE — 36600 WITHDRAWAL OF ARTERIAL BLOOD: CPT

## 2023-02-22 PROCEDURE — 250N000009 HC RX 250: Performed by: INTERNAL MEDICINE

## 2023-02-22 PROCEDURE — 36415 COLL VENOUS BLD VENIPUNCTURE: CPT | Performed by: STUDENT IN AN ORGANIZED HEALTH CARE EDUCATION/TRAINING PROGRAM

## 2023-02-22 PROCEDURE — 99232 SBSQ HOSP IP/OBS MODERATE 35: CPT | Mod: GC | Performed by: INTERNAL MEDICINE

## 2023-02-22 PROCEDURE — 94640 AIRWAY INHALATION TREATMENT: CPT | Mod: 76

## 2023-02-22 PROCEDURE — 80048 BASIC METABOLIC PNL TOTAL CA: CPT | Performed by: INTERNAL MEDICINE

## 2023-02-22 PROCEDURE — 999N000147 HC STATISTIC PT IP EVAL DEFER

## 2023-02-22 PROCEDURE — 82330 ASSAY OF CALCIUM: CPT | Performed by: INTERNAL MEDICINE

## 2023-02-22 PROCEDURE — 999N000157 HC STATISTIC RCP TIME EA 10 MIN

## 2023-02-22 PROCEDURE — 272N000202 HC AEROBIKA WITH MANOMETER

## 2023-02-22 PROCEDURE — G0463 HOSPITAL OUTPT CLINIC VISIT: HCPCS

## 2023-02-22 PROCEDURE — 82103 ALPHA-1-ANTITRYPSIN TOTAL: CPT | Performed by: INTERNAL MEDICINE

## 2023-02-22 RX ORDER — IPRATROPIUM BROMIDE AND ALBUTEROL SULFATE 2.5; .5 MG/3ML; MG/3ML
1 SOLUTION RESPIRATORY (INHALATION) EVERY 6 HOURS PRN
COMMUNITY
End: 2023-05-04

## 2023-02-22 RX ORDER — AMOXICILLIN 250 MG
1 CAPSULE ORAL AT BEDTIME
Status: DISCONTINUED | OUTPATIENT
Start: 2023-02-22 | End: 2023-02-24 | Stop reason: HOSPADM

## 2023-02-22 RX ORDER — ALBUTEROL SULFATE 90 UG/1
2 AEROSOL, METERED RESPIRATORY (INHALATION) 2 TIMES DAILY
Status: DISCONTINUED | OUTPATIENT
Start: 2023-02-22 | End: 2023-02-24 | Stop reason: HOSPADM

## 2023-02-22 RX ORDER — NICOTINE POLACRILEX 4 MG
15-30 LOZENGE BUCCAL
Status: DISCONTINUED | OUTPATIENT
Start: 2023-02-22 | End: 2023-02-22

## 2023-02-22 RX ORDER — DEXTROSE MONOHYDRATE 25 G/50ML
25-50 INJECTION, SOLUTION INTRAVENOUS
Status: DISCONTINUED | OUTPATIENT
Start: 2023-02-22 | End: 2023-02-22

## 2023-02-22 RX ORDER — POLYETHYLENE GLYCOL 3350 17 G/17G
17 POWDER, FOR SOLUTION ORAL DAILY
Status: DISCONTINUED | OUTPATIENT
Start: 2023-02-22 | End: 2023-02-24 | Stop reason: HOSPADM

## 2023-02-22 RX ORDER — IPRATROPIUM BROMIDE AND ALBUTEROL SULFATE 2.5; .5 MG/3ML; MG/3ML
3 SOLUTION RESPIRATORY (INHALATION) EVERY 6 HOURS
Status: DISCONTINUED | OUTPATIENT
Start: 2023-02-22 | End: 2023-02-24 | Stop reason: HOSPADM

## 2023-02-22 RX ORDER — CARBOXYMETHYLCELLULOSE SODIUM 5 MG/ML
1 SOLUTION/ DROPS OPHTHALMIC
Status: DISCONTINUED | OUTPATIENT
Start: 2023-02-22 | End: 2023-02-24 | Stop reason: HOSPADM

## 2023-02-22 RX ADMIN — PREGABALIN 150 MG: 75 CAPSULE ORAL at 08:27

## 2023-02-22 RX ADMIN — AMLODIPINE BESYLATE 10 MG: 10 TABLET ORAL at 08:27

## 2023-02-22 RX ADMIN — CETIRIZINE HYDROCHLORIDE 10 MG: 10 TABLET, FILM COATED ORAL at 08:27

## 2023-02-22 RX ADMIN — IPRATROPIUM BROMIDE AND ALBUTEROL SULFATE 3 ML: 2.5; .5 SOLUTION RESPIRATORY (INHALATION) at 00:43

## 2023-02-22 RX ADMIN — ASPIRIN 81 MG: 81 TABLET ORAL at 08:27

## 2023-02-22 RX ADMIN — ACETYLCYSTEINE 4 ML: 100 SOLUTION ORAL; RESPIRATORY (INHALATION) at 20:57

## 2023-02-22 RX ADMIN — PANTOPRAZOLE SODIUM 40 MG: 40 TABLET, DELAYED RELEASE ORAL at 08:27

## 2023-02-22 RX ADMIN — IPRATROPIUM BROMIDE AND ALBUTEROL SULFATE 3 ML: 2.5; .5 SOLUTION RESPIRATORY (INHALATION) at 14:34

## 2023-02-22 RX ADMIN — POLYETHYLENE GLYCOL 400 AND PROPYLENE GLYCOL 1 DROP: 4; 3 SOLUTION/ DROPS OPHTHALMIC at 16:53

## 2023-02-22 RX ADMIN — FLUTICASONE PROPIONATE 1 SPRAY: 50 SPRAY, METERED NASAL at 08:30

## 2023-02-22 RX ADMIN — POLYETHYLENE GLYCOL 400 AND PROPYLENE GLYCOL 1 DROP: 4; 3 SOLUTION/ DROPS OPHTHALMIC at 11:32

## 2023-02-22 RX ADMIN — KETOTIFEN FUMARATE 1 DROP: 0.35 SOLUTION/ DROPS OPHTHALMIC at 22:35

## 2023-02-22 RX ADMIN — PANTOPRAZOLE SODIUM 40 MG: 40 TABLET, DELAYED RELEASE ORAL at 20:19

## 2023-02-22 RX ADMIN — PREDNISONE 40 MG: 20 TABLET ORAL at 08:27

## 2023-02-22 RX ADMIN — ALBUTEROL SULFATE 2 PUFF: 90 AEROSOL, METERED RESPIRATORY (INHALATION) at 09:57

## 2023-02-22 RX ADMIN — ACETAMINOPHEN 650 MG: 325 TABLET, FILM COATED ORAL at 09:57

## 2023-02-22 RX ADMIN — ACETAMINOPHEN 650 MG: 325 TABLET, FILM COATED ORAL at 17:01

## 2023-02-22 RX ADMIN — POLYETHYLENE GLYCOL 400 AND PROPYLENE GLYCOL 1 DROP: 4; 3 SOLUTION/ DROPS OPHTHALMIC at 20:19

## 2023-02-22 RX ADMIN — POLYETHYLENE GLYCOL 400 AND PROPYLENE GLYCOL 1 DROP: 4; 3 SOLUTION/ DROPS OPHTHALMIC at 08:37

## 2023-02-22 RX ADMIN — POLYETHYLENE GLYCOL 3350 17 G: 17 POWDER, FOR SOLUTION ORAL at 11:32

## 2023-02-22 RX ADMIN — ENOXAPARIN SODIUM 40 MG: 40 INJECTION SUBCUTANEOUS at 10:00

## 2023-02-22 RX ADMIN — NICOTINE 1 PATCH: 14 PATCH, EXTENDED RELEASE TRANSDERMAL at 08:30

## 2023-02-22 RX ADMIN — ACETYLCYSTEINE 4 ML: 100 SOLUTION ORAL; RESPIRATORY (INHALATION) at 14:34

## 2023-02-22 RX ADMIN — ALBUTEROL SULFATE 2 PUFF: 90 AEROSOL, METERED RESPIRATORY (INHALATION) at 20:19

## 2023-02-22 RX ADMIN — AZITHROMYCIN MONOHYDRATE 250 MG: 250 TABLET ORAL at 08:27

## 2023-02-22 RX ADMIN — SENNOSIDES AND DOCUSATE SODIUM 1 TABLET: 50; 8.6 TABLET ORAL at 22:35

## 2023-02-22 RX ADMIN — IPRATROPIUM BROMIDE AND ALBUTEROL SULFATE 3 ML: 2.5; .5 SOLUTION RESPIRATORY (INHALATION) at 20:57

## 2023-02-22 RX ADMIN — PREGABALIN 150 MG: 75 CAPSULE ORAL at 20:19

## 2023-02-22 ASSESSMENT — ACTIVITIES OF DAILY LIVING (ADL)
ADLS_ACUITY_SCORE: 30
DEPENDENT_IADLS:: INDEPENDENT
ADLS_ACUITY_SCORE: 30

## 2023-02-22 NOTE — PROGRESS NOTES
02/21/23 1456   Appointment Info   Signing Clinician's Name / Credentials (OT) Nikkie Montana OTR/L   Rehab Comments (OT) Monitor O2   Living Environment   People in Home alone   Current Living Arrangements apartment   Home Accessibility other (see comments)  (No JASON, but it is long walk to reach pt's unit. Per chart, pt's daughter can drop off in underground parking lot, which reduces walking distance.)   Transportation Anticipated family or friend will provide   Living Environment Comments Pt lives with a small dog in in a single-level apartment w/ in-unit laundry. Bathroom includes tub shower- has grab bar that clamps onto tub, standard toilet (no grab bars-wanting to get some- going to ask her CADI waiver)   Self-Care   Usual Activity Tolerance moderate   Current Activity Tolerance fair   Equipment Currently Used at Home walker, rolling;grab bar, toilet;shower chair   Fall history within last six months yes   Number of times patient has fallen within last six months 1   Activity/Exercise/Self-Care Comment Per PT/OT evals from 2/5/22: Pt has 2 4WW (uses 1 outside & 1 inside) & also has cane; uses walker more often. Uses 3L O2 at home. Reports portable O2 device that she takes with her runs out (of charge or O2, unclear) after 1 hr and this is not long enough for her. Pt's daughter assists with taking her to grocery store, pt uses cart she can sit it but this trip is still fatiguing.   Instrumental Activities of Daily Living (IADL)   Previous Responsibilities medication management;finances;driving;shopping;laundry;housekeeping;meal prep   IADL Comments Pt reports she was IND-Mod I w/ ADL/IADLs, but community mobility limited by 1 hr capacity of O2 tank. Pt reports IADLs at home have been more difficult recently.   General Information   Onset of Illness/Injury or Date of Surgery 02/20/23   Referring Physician Boy Steward MD   Patient/Family Therapy Goal Statement (OT) Improve endurance for  "ADL/IADLs   Additional Occupational Profile Info/Pertinent History of Current Problem Per chart: \"Jenn Aparicio is a 67 year old female admitted on 2/20/2023 for acute hypoxic hypercapneic respiratory failure most likely 2/2 COPD exacerbation\"   Existing Precautions/Restrictions fall;oxygen therapy device and L/min   Left Upper Extremity (Weight-bearing Status) full weight-bearing (FWB)   Right Upper Extremity (Weight-bearing Status) full weight-bearing (FWB)   Left Lower Extremity (Weight-bearing Status) full weight-bearing (FWB)   Right Lower Extremity (Weight-bearing Status) full weight-bearing (FWB)   Cognitive Status Examination   Orientation Status orientation to person, place and time   Cognitive Status Comments No acute concerns. Pt is mildly impulsive   Visual Perception   Visual Impairment/Limitations corrective lenses full-time   Sensory   Sensory Comments Neuropathy in Karlos feet   Range of Motion Comprehensive   Comment, General Range of Motion WFL for ADLs   Strength Comprehensive (MMT)   Comment, General Manual Muscle Testing (MMT) Assessment WFL for ADLs   Coordination   Upper Extremity Coordination No deficits were identified   Bed Mobility   Bed Mobility supine-sit;sit-supine   Supine-Sit [place] (Bed Mobility) independent   Sit-Supine [place] (Bed Mobility) independent   Sit-Stand Transfer   Sit-Stand [place] (Transfers) supervision   Sit/Stand Transfer Comments Pt firmly declining use of FWW, stating it makes her more unsteady. Pt uses 4WW at baseline.   Balance   Balance Comments No overt LOB, pt furniture surfing instead of using offered FWW. Will continue to assess.   Bathing Assessment/Intervention   [place] Level (Bathing) minimum assist (75% patient effort)   Assistive Devices (Bathing) shower chair   Comment, (Bathing) Min A for energy conservation, per clinical judgment   Upper Body Dressing Assessment/Training   [place] Level (Upper Body Dressing) supervision   Lower " Body Dressing Assessment/Training   [place] Level (Lower Body Dressing) maximum assist (25% patient effort)   Comment, (Lower Body Dressing) Mod A to don socks, pt declining to attempt 2/2 neuropathy and pt SOB   Grooming Assessment/Training   [place] Level (Grooming) supervision   Toileting   [place] Level (Toileting) verbal cues   Assistive Devices (Toileting) commode chair   Clinical Impression   Criteria for Skilled Therapeutic Interventions Met (OT) Yes, treatment indicated   OT Diagnosis Impaired ADL/IADL IND   OT Problem List-Impairments impacting ADL problems related to;activity tolerance impaired;mobility;sensation   Assessment of Occupational Performance 3-5 Performance Deficits   Identified Performance Deficits dressing, bathing, housekeeping, meal prep   Planned Therapy Interventions (OT) ADL retraining;IADL retraining;transfer training;home program guidelines;progressive activity/exercise   Clinical Decision Making Complexity (OT) low complexity   Anticipated Equipment Needs Upon Discharge (OT)   (TBD)   Risk & Benefits of therapy have been explained evaluation/treatment results reviewed;care plan/treatment goals reviewed;risks/benefits reviewed;current/potential barriers reviewed;participants voiced agreement with care plan;participants included;patient   Clinical Impression Comments Pt presents below IND-Mod I baseline for ADL/IADL performance, limited by reduced activity tolerance. Pt will benefit from IP OT to maximize ADL IND and safety.   OT Total Evaluation Time   OT Eval, Low Complexity Minutes (14927) 15   OT Goals   Therapy Frequency (OT) 4 times/wk   OT Predicted Duration/Target Date for Goal Attainment 03/10/23   OT: Hygiene/Grooming modified independent   OT: Lower Body Dressing Modified independent   OT: Transfer Modified independent  (tub/shower transfer)   OT: Toilet Transfer/Toileting Modified independent;toilet transfer;cleaning and garment management   OT: Goal 1 Pt  will demonstrate IND completion of HEP to promote strength and endurance for ADL performance   OT Discharge Planning   OT Plan Repeat G/H for endurance, toileting on toilet, LB dress, endurance/HEP   OT Discharge Recommendation (DC Rec) home with home care occupational therapy;home with assist   OT Rationale for DC Rec Pt presents below functional baseline and would benefit from HH OT to progress functional cognition and A w/ maximizing pt IND and tolerance for I/ADLs. Required 3L NC today with activity d/t pt desatting to 86%. Able to return to 3L NC with rest break.   OT Brief overview of current status SBA, pt refusing FWW   Total Session Time   Total Session Time (sum of timed and untimed services) 15

## 2023-02-22 NOTE — PROGRESS NOTES
Received referral from RT Nayana, for non-invasive ventilator for this patient. Pt has hx of chronic resp failure due to COPD. No further testing needed to qualify pt. Waiting for dkocumentation of medical necessity and order with settings. Will follow up with care manager for discharge plans and to arrange setup.     Maranda Valdes, RRT  [facility]  [phone]

## 2023-02-22 NOTE — PROGRESS NOTES
"Hospital Medicine  Called by JOHANN Albarran - wondering about need for BIPAP or CPAP.  Has been off it \"since a couple days ago\" per relay information from patient to JOHANN Albarran.  Currently doing well per RN and is not on BIPAP and oxygen saturation(s) are good and she is speaking in full sentences.    Plan:  RN advised to call me if respiratory status changes - otherwise will hold on ordering BIPAP or CPAP for now.  I also changed accuchecks / POC blood glucose checks to QID / AC and bedtime/HS.  Geovany Diaz MD    "

## 2023-02-22 NOTE — PROGRESS NOTES
Spoke with Provider about ABG ordered. Requested by RT that has known her for some time to qualify for home bipap. Patient has not worn bipap in a few days. Paged out to requesting person. Will wait for call back prior to ABG being drawn.     1043-Spoke with COPD educator Nayana, who has been following patient. She had received feedback about getting ABG for getting qualified for trilogy at home. ABG will be done prior to afternoon nebs. Nayana fine with this.     1450- Clarified BIPAP order that is now placed with Nayana to see what home settings/close to will be for overnight tonight. Settings to be AVAPS 14/400/ max25 min 10/ epap 8. Will attempt these settings overnight and update the patient.

## 2023-02-22 NOTE — PROGRESS NOTES
Chronic Pulmonary Disease Specialist progress note    Writer spoke with patient this morning regarding the possible use of a Trilogy non-invasive ventilator for use at night when at home. Writer explained the reason why this device would be beneficial for patient. Writer explained that using this device every night may help with keeping her PCO2 at an appropriate level, providing rest for her heart and lungs, and giving her a more restful night of sleep(Patient states that she wakes up during the night often gasping for air). Another benefit of continued use will be patient's ability to function well throughout the day. Patient is agreeable to using this device when she is discharged to home    Recommended settings for Trilogy NIV are as follows:    Target Tidal Volume: 400 ml  Maximum pressure:  40 cmH20  Pressure support maximum/minimum: 30/4cmH20  EPAP maximum/minimum: 12/5cmH20  Backup Respiratory Rate: 14  Oxygen Bleed in: 3LPM    Traditional home bi-level therapy has been considered and ruled out as an effective treatment for this patient. Due to the severity of her disease and the ventilation modality needed, non-invasive ventilation is being ordered for home use. The patient suffers from chronic respiratory failure due to severe hypercapnia secondary to severe COPD. Without this level of therapy, this patient would be at high risk for readmission. This equipment is able to mirror the therapy given as an in-patient on similar ventilation equipment and it is the safest, most effective option for this patient.     SILVESTRE Mistry, RRT, CTTS  Chronic Pulmonary Disease Specialist  Office: [phone]   Pager: [phone]

## 2023-02-22 NOTE — PROGRESS NOTES
"CLINICAL NUTRITION SERVICES - ASSESSMENT NOTE     Nutrition Prescription    RECOMMENDATIONS FOR MDs/PROVIDERS TO ORDER:  - Pt notes she was having low BG overnight with addition of second dose of glipizide at home.  She notes she is set to see a \"specialist\" in the near future to review her past BGs.  She is wondering about what Rx she will go home on with her tapering steroids.  Noted pt didn't get AM novolog as she didn't order breakfast so likely reason BG was 240 before lunch today.   If any insulin needed should likely be NPH in AM to match the blood sugar rise with steroids.     Malnutrition Status:    Unable to determine due to limited data at present.     Recommendations already ordered by Registered Dietitian (RD):  - Start Chocolate Ensure Max @ HS  - Start multivitamin w/ minerals to meet micronutrient needs.     Future/Additional Recommendations:  - See 2/24 as able for NPFE to complete malnutrition assessment.      REASON FOR ASSESSMENT  Jenn Aparicio is a/an 67 year old female assessed by the dietitian for Malnutrition Screening Tool--2-13 lb wt loss and decreased po intake.     Chart reviewed.  Per chart: PMH includes lRLL adenocarcinoma s/p lobectomy in 2016, RUL NSCLC s/p SBRT (1/2020), COPD requiring baseline 3 L O2, T2DM and GERD.     NUTRITION HISTORY  Pt notes decreased appetite and inconsistent meal patterns at home.  States blood sugars seem to go up and down not always related to po intake. Notes lows in the 60s sometimes since adding second glipizide dose in the evening.  Tries to limit carbs to avoid high blood sugars and also limits sodium.  Pt knows to treat lows with piece of fruit or hard candy that brings sugars back up.  Using Marti device for closer monitoring of BG lately.    CURRENT NUTRITION ORDERS  Diet: Regular diet except conditionally NPO while requiring BIPAP or other resp therapist.  Intake/Tolerance: No intake documented in flow sheets yet this admission.  Per RN progress " "notes, \"tolerating\" 2/23.  Noted pt sometimes refusing BIPAP overnight. Unable to access # of meals pt has been able to order since admission since this writer is working from home.  When she spoke she stated she didn't miss any meals due to BIPAP and feels intake is \"picking up\" although did skip breakfast this AM.     LABS  Labs reviewed  Noted some elevated BG. 2/22 315 @ ~10pm as HS peramters not met, received HS dose at 1215am, 105 over night @ 2:19 AM, No insulin given with breakfast noting BG of 105 (however this was from 6 hr earlier) and 240 around noon today.  Unclear where pt already started eating prior to FSBG.    MEDICATIONS  Medications reviewed  Novolog--High resistance correction ac and hs adjusted from q 4 hr over noc 2/22.    Prednisone 40 mg @ 8 am started 2/22 for 7 doses.  Protonix EC 2x/d  Miralax q day, senna-docusate q hs     Home Rx: included Jaridance 25 mg q d, glipizide 2x/d, Lovaza.     ANTHROPOMETRICS  Height: 5'6\"  Most Recent Weight: 89.5 kg (197 lb 4.8 oz)    IBW: 59.1 kg (151.4% IBW)  BMI: Obesity Grade I BMI 30-34.9  Weight History: Wt down 4.6% in 6 months. Wt down ~12.3% over 1 year Jan 2022 to Jan 2023. Weights hovering around 200 lb for several months.   Wt Readings from Last 20 Encounters:   02/21/23 89.5 kg (197 lb 4.8 oz)   02/08/23 92.5 kg (204 lb)   01/01/23 88.2 kg (194 lb 7.1 oz)   12/01/22 91.5 kg (201 lb 12.8 oz)   08/31/22 93.8 kg (206 lb 11.2 oz)   08/16/22 90.7 kg (200 lb)   08/10/22 93.4 kg (206 lb)   08/04/22 94.4 kg (208 lb 1.6 oz)   06/29/22 95 kg (209 lb 6.4 oz)   05/20/22 95.7 kg (210 lb 15.7 oz)   05/04/22 95.7 kg (211 lb)   03/31/22 95.7 kg (211 lb)   01/27/22 99.7 kg (219 lb 11.2 oz)   01/11/22 100.5 kg (221 lb 8 oz)   07/02/21 90.9 kg (200 lb 4.8 oz)   06/22/21 81.6 kg (180 lb)   03/30/21 90.7 kg (200 lb)   02/26/21 93.8 kg (206 lb 12.7 oz)   09/08/20 93.6 kg (206 lb 4.8 oz)   08/13/15 73.6 kg (162 lb 3.2 oz)     Dosing Weight: 67 kg (based on 2/21 wt of " 89.5 kg and IBW of 59 kg).    ASSESSED NUTRITION NEEDS  Estimated Energy Needs: 1340 - 1675 + kcals/day (20 - 25 kcals/kg)  Justification: Increased needs w/ Ca and advanced COPD countered by current obesity   Estimated Protein Needs: 80 - 101 grams protein/day (1.2 - 1.5 grams of pro/kg)  Justification: Ca and Obesity guidelines  Estimated Fluid Needs: 1675 - 2010 mL/day (25 - 30 mL/kg)   Justification: Maintenance and Per provider pending fluid status    PHYSICAL FINDINGS  See malnutrition section below.    MALNUTRITION  % Intake: </=75% for >/= 1 month (severe)  % Weight Loss: Weight loss does not meet criteria  Subcutaneous Fat Loss: Unable to assess as writer working remotely today  Muscle Loss: Unable to assess (see above)  Fluid Accumulation/Edema: Unable to assess  Malnutrition Diagnosis: Unable to determine due to limited data at present.     NUTRITION DIAGNOSIS  Inadequate oral intake related to decreased appetite, free of increasing BG as evidenced by discussion with pt.       INTERVENTIONS  Implementation  Nutrition Education: Discussed benefits of consistent carb intake to help manage BG and avoid wide swing in readings.  Encouraged follow up with provider to find best Rx plan to match her needs.  Encouraged pt to ask provider here about plan for Rx at discharge with plan for prednisone for next several weeks. Also discussed option for RD referral via primary or oncology clinic if pt has further weight loss trend or decreased po intake.    Medical food supplement therapy  Multivitamin/mineral supplement therapy     Goals  Patient to consume % of nutritionally adequate meal trays TID, or the equivalent with supplements/snacks.  BG > 70 and < 180.     Monitoring/Evaluation  Progress toward goals will be monitored and evaluated per protocol.    Rani Bray RD, LD   7B (M-F) Pager: [phone]  RD Weekend/Holiday Pager: [phone]

## 2023-02-22 NOTE — CONSULTS
Care Management Initial Consult    General Information  Assessment completed with: Patient,    Type of CM/SW Visit: Initial Assessment    Primary Care Provider verified and updated as needed: Yes   Readmission within the last 30 days: previous discharge plan unsuccessful      Reason for Consult: discharge planning  Advance Care Planning: Advance Care Planning Reviewed: no concerns identified, present on chart          Communication Assessment  Patient's communication style: spoken language (English or Bilingual)    Hearing Difficulty or Deaf: no   Wear Glasses or Blind: yes    Cognitive  Cognitive/Neuro/Behavioral: WDL                      Living Environment:   People in home: alone     Current living Arrangements: apartment      Able to return to prior arrangements: yes       Family/Social Support:  Care provided by: self  Provides care for: no one  Marital Status: Single  Children          Description of Support System: Supportive, Involved    Support Assessment: Adequate family and caregiver support, Adequate social supports    Current Resources:   Patient receiving home care services: No     Community Resources: PCA, County Worker  Equipment currently used at home: walker, rolling, shower chair  Supplies currently used at home:      Employment/Financial:  Employment Status: disabled        Financial Concerns: No concerns identified           Lifestyle & Psychosocial Needs:  Social Determinants of Health     Tobacco Use: High Risk     Smoking Tobacco Use: Every Day     Smokeless Tobacco Use: Former     Passive Exposure: Not on file   Alcohol Use: Not on file   Financial Resource Strain: Not on file   Food Insecurity: Not on file   Transportation Needs: Not on file   Physical Activity: Not on file   Stress: Not on file   Social Connections: Not on file   Intimate Partner Violence: Not on file   Depression: Not at risk     PHQ-2 Score: 2   Housing Stability: Not on file       Functional Status:  Prior to admission  patient needed assistance:   Dependent ADLs:: Independent  Dependent IADLs:: Independent       Mental Health Status:  Mental Health Status: No Current Concerns       Chemical Dependency Status:  Chemical Dependency Status: No Current Concerns             Values/Beliefs:  Spiritual, Cultural Beliefs, Congregation Practices, Values that affect care: no               Additional Information:  Patient is a 67 year old female admitted for acute hypoxic hypercapneic respiratory failure secondary to COPD exacerbation.  Met with pt at bedside to introduce RNCC role and discuss discharge planning.  Pt lives alone in an apartment in [place].  Her daughter, Maureen, is a good support to her and lives close to her.  Pt has 28 hours a week of PCA support.  Pt is on home oxygen, 3-4L, provided by [facility].  COPD RT is assist in getting pt a trilogy NIV for home which will be provided by [facility].  Pt was admitted 2 weeks ago and was set up to have home care with [facility], unfortunately they were not notified that pt discharged so they had not started services.  Discussed with pt and she is interested in having these services.  Spoke with Esteban with [facility] and they are still able to accept the pt.  Orders in place.  Pt reports at discharge she will need assistance with arranging a med cab ride and will need portable oxygen for transport.  Will need to call [facility] on day of discharge to have a portable tank delivered.  RNCC will continue to follow and assist with discharge planning.        [facility](RN/PT/OT)  Phone: [phone]  Fax: [phone]  Perlita Orellana Ph: [phone], Fax: [phone]     [facility](home oxygen-3-4L)  Phone: [phone]    DB Marcos  Phone: [phone]  Pager: [phone]    SEARCHABLE in [facility] - search CARE COORDINATOR     [facility] [employer] [place] ([phone]) Saturday & Sunday; ([phone]) [facility] Holidays     Units: 4A, 4C, 4E, 5A & 5B    Pager: [phone]    Units: 6A & 6B    Pager: [phone]    Units: 6C & 6D   Pager: [phone]    Units: 7A, 7B, 7C, 7D & 5C    Pager: [phone]    Units: [facility] ED, 5 Ortho, 5 Med/Surg, 6 Med/Surg, 8A, 10 ICU, & [facility]    Pager: [phone]

## 2023-02-22 NOTE — PROVIDER NOTIFICATION
MD notified    Do you want pt on BIPAP or CPAP overnight and q4 BS?    MD response: no BiPAP or CPAP at this time and ACHS BS

## 2023-02-22 NOTE — PROGRESS NOTES
Admitted/transferred from: ED   2 RN full   skin assessment completed by Avis Sesay RN and Allison.  Skin assessment finding: issues found BS monitor on left arm and bruise on right arm and nicotine patch    Interventions/actions: other Educated on repositioning and preassure wound prevention      Will continue to monitor.

## 2023-02-22 NOTE — PROVIDER NOTIFICATION
MD notified    pt still has q4 BS coverage in the mar do you want to discontinue that order since you changed the BS to ACHS?    MD response: discontinued order

## 2023-02-22 NOTE — CONSULTS
Chronic Pulmonary Disease Specialist Consult      2023    Patient: Jenn Aparicio      :  1955                    MRN:[id number]      Reason for Consult:  Consulted to provide COPD education and optimize treatment regimen. This is patient's 3rd hospitalization since 2023 for COPD exacerbation.    Recommendations:      Will need referral for follow up appointment with Pulmonologist and complete PFT's. Patient last saw her pulmonologist on 8/10/2022 and was to have a follow up visit in 2022. Has not completed this appt as she was ill on that day and has been hospitalized 3 times.    Provide prescriptions for 1 month supply of each, 14mg & 7 mg nicotine patch, 4 mg nicotine lozenges, and Nicotrol inhaler.    At discharge continue with patient's home regimen of respiratory medications. Would continue patient's Mucomyst nebs for additional 30 days.    Order CT scan per Hospitalist's conversation with pulmonary that is included on the H & P.      Referral for OP sleep consult for titration of new Trilogy non-invasive ventilator settings. (referral is already in chart)    Referral for outpatient pulmonary rehab (Referral is already in chart. Patient is very interested in pursuing this).    Continue with current inpatient respiratory medication schedule.     Inpatient pulmonary consult for further recommendations and coordination of care  (Done)     Provide free Alpha-1 Antitrypsin Deficiency Testing with consent    Use Aerobika Oscillating PEP Device for 3 sets of 10 breaths two times daily as directed above    PT/OT consult to perform cognitive evaluation, assess patients functional abilities, limitations, home care needs, and make recommendations for safe transition to home or TCU.    Will assess patient for NIV for home use to reduce WOB, support respiratory mechanics, and to manage hypoventilation    Patient is a good candidate for COPD Action Plan due to high readmission risk.    History  of Present Illness: Jenn Aparicio is a 67 year old female admitted on 2/20/2023 for acute hypoxic hypercapneic respiratory failure most likely 2/2 COPD exacerbation. ?2/2 exposure to cleaning products? Patient recently moved to [facility] and was unpacking boxes with items that had mold on them and was using Pine-Sol for cleaning. Elevated eosinophils on the diff  Could be due to this.    This is the 3rd COPD exac for patient in the last 2 months. Before that, she had one admission in May 2022 and one in June 2021.     Patient is very knowledgeable about her medical Hx and states she had a supply of steroids at home and she would take it at least once a month for an exac in '21 and '22, but did not need hospital stays like she has this year.    Smoking Hx:  Current 0.25 PPD smoker. Has been attempting to quit. Is using the patch and the Nicotrol inhaler at home.                   Pulmonologist/Last office visit Dr. Kailee Moralez at [facility] located at the [facility].  Last office visit was 8/10/2022.    Most recent  PFT/interpretation on:   12/15/2020               FVC                   1.32 LPM    44%    FEV1                   0.57 LPM    24%   FEV1/FVC   (Ratio)                                                                                               44%   DLCO                                       38%   Interpretation Very severe airflow obstruction with significant bronchodilator response. Severe diffusion defect.       Sleep Studies: Patient had a home sleep study in 2021 and was recommended to have a study in the sleep lab. She had an appointment scheduled for this study but was ill.  She has not rescheduled this yet.     Home Oxygen Use:  Patient has home oxygen provided by [facility] and is on 3 L at rest and 4 L with activity.        Pulmonary Rehab History:   None     Home respiratory medications include:      Albuterol MDI Q 4 prn (Patient uses an Aerochamber  spacing device when she uses her Albuterol)  Duonebs Q 6 prn (Uses with her Mucomyst nebs)  Mucomyst 4 ml 10% solution BID (Patient was to use for 30 days after previous admission-Would continue for another 30 days)  Trelegy Ellipta Q day (Patient was reminded to rinse and spit after using this medication)    Assessment:  Jenn was reclining comfortably in bed upon my entrance to her room. She was not in respiratory distress or having any SOB. She was on 3 L oxygen via NC with oxygen saturation 98%.        Action:         Evaluated patients inspiratory flow using In-Check device:     --Low resistance setting: Patient able to generate 50 LPM,   which is sufficient inspiratory flow for her Albuterol rescue inhaler.  Albuterol inhalers require a slow inhalation of 20-60 LPM for optimal drug disposition with 5-10 second breath hold.      --Medium resistance setting: Patient able to generate 60 LPM,   which is sufficient inspiratory flow for her Trelegy Ellipta DPI.   Medium resistance inhalers require a fast and deep inhalation of   30-80LPM with 5-10 second breath hold.     --Evaluated patients' coordination and technique with inhaler: Patient demonstrated good technique with all of her inhalers. Educated patient on proper inspiratory flows needed for all her inhalers. Provided and instructed patient on proper use of Aerochamber spacer with inhaler; reiterated that spacer should always be used with her Albuterol rescue inhaler.      Will continue to follow and support patient as needed. Will follow up with phone call 48 hours after discharge.     I spent 45 minutes with the patient.    Nayana Gonzalez, BS, RRT, CTTS  Chronic Pulmonary Disease Specialist  Office: [phone]   Pager: [phone]

## 2023-02-22 NOTE — PHARMACY-ADMISSION MEDICATION HISTORY
Admission Medication History Completed by Pharmacy    See [facility] Admission Navigator for allergy information, preferred outpatient pharmacy, prior to admission medications and immunization status.     Medication History Sources:     External med dispense report    Chart review    Patient interview    Changes made to PTA medication list (reason):    Added:   o Ipratropium-albuterol nebulizers    Deleted:   o None, but marked doxycycline as not taking, last filled #5 on 9/23/22    Changed:   o Fluticasone nasal spray from daily to twice daily    Additional Information:    Patient manages medications independently and knew them all well. Would like to have a syringe at home to help with mucomyst nebulizer set up given difficulty with pouring from container.    Patient has nicotine replacement therapy at home, has been using the patch, but did not start the lozenge or inhaler at home yet. Does like her experience with the lozenges and inhaler while in the hospital so far.    Prior to Admission medications    Medication Sig Last Dose   acetylcysteine (MUCOMYST) 10 % nebulizer solution Inhale 4 mLs into the lungs 4 times daily for 30 days Past Week   albuterol (PROAIR HFA/PROVENTIL HFA/VENTOLIN HFA) 108 (90 Base) MCG/ACT inhaler INHALE 1 OR 2 PUFFS BY MOUTH EVERY 4 HOURS AS NEEDED Past Week   albuterol (PROVENTIL) (2.5 MG/3ML) 0.083% neb solution Take 1 vial (2.5 mg) by nebulization 4 times daily for 30 days Past Week   amLODIPine (NORVASC) 10 MG tablet Take 1 tablet (10 mg) by mouth daily Past Week   aspirin 81 MG EC tablet Take 1 tablet (81 mg) by mouth daily Past Week   cetirizine (ZYRTEC) 10 MG tablet Take 1 tablet (10 mg) by mouth daily Past Week   empagliflozin (JARDIANCE) 25 MG TABS tablet Take 1 tablet (25 mg) by mouth daily Past Week   fluticasone (FLONASE) 50 MCG/ACT nasal spray Spray 1 spray into both nostrils daily Use at night before bed  Patient taking differently: Spray 1 spray into both nostrils 2 times  daily Past Week   Fluticasone-Umeclidin-Vilanterol (TRELEGY ELLIPTA) 200-62.5-25 MCG/INH oral inhaler Inhale 1 puff into the lungs daily Past Week   glipiZIDE (GLUCOTROL) 5 MG tablet Take 1 tablet (5 mg) by mouth 2 times daily (before meals) Past Week   glucosamine-chondroitin 500-400 MG tablet Take 1 tablet by mouth daily Past Week   ipratropium - albuterol 0.5 mg/2.5 mg/3 mL (DUONEB) 0.5-2.5 (3) MG/3ML neb solution Take 1 vial by nebulization every 6 hours as needed for shortness of breath, wheezing or cough Past Week   ketotifen (ZADITOR) 0.025 % ophthalmic solution Place 1 drop into both eyes daily Past Week   multivitamin w/minerals (THERA-VIT-M) tablet Take 1 tablet by mouth daily Past Week   nicotine (NICODERM CQ) 14 MG/24HR 24 hr patch Place 1 patch onto the skin daily for 30 days 2/18/2023   nicotine (NICORETTE) 4 MG lozenge Place 1 lozenge (4 mg) inside cheek every hour as needed for smoking cessation Unknown   nicotine (NICOTROL) 10 MG inhaler Use 1 cartridge as needed for urge to smoke by puffing over course of 20min.  Use 6-16 cart/day; reduce number of cart/day over 6-12 weeks. Unknown   omega-3 acid ethyl esters (LOVAZA) 1 g capsule Take 2 capsules (2 g) by mouth 2 times daily Please call [phone] to schedule follow up visit for more refills. Past Week   omeprazole (PRILOSEC) 20 MG DR capsule Take 1 capsule (20 mg) by mouth 2 times daily Past Week   polyethylene glycol-propylene glycol (SYSTANE) 0.4-0.3 % SOLN ophthalmic solution Place 1 drop into both eyes 4 times daily Past Week   pregabalin (LYRICA) 150 MG capsule Take 1 capsule (150 mg) by mouth 2 times daily Past Week   doxycycline hyclate (VIBRA-TABS) 100 MG tablet Take 1 tablet (100 mg) by mouth 2 times daily  Patient not taking: Reported on 2/22/2023 Not Taking       Date completed: 02/22/23    Medication history completed by: Kinjal Rodriguez [facility]

## 2023-02-22 NOTE — PROGRESS NOTES
Physician Attestation   I, Gab Walden MD, was present with the medical/ANN student who participated in the service and in the documentation of the note.  I have verified the history and personally performed the physical exam and medical decision making.  I agree with the assessment and plan of care as documented in the note.      Key findings:     Case discussed today with COPD specialist, and I agree with the following settings:    Traditional home bi-level therapy has been considered and ruled out as an effective treatment for this patient. Due to the severity of her disease and the ventilation modality needed, non-invasive ventilation is being ordered for home use. The patient suffers from chronic respiratory failure due to severe hypercapnia secondary to severe COPD. Without this level of therapy, this patient would be at high risk for readmission. This equipment is able to mirror the therapy given as an in-patient on similar ventilation equipment and it is the safest, most effective option for this patient.      a/p reviewed in detail with KEON Pfeiffer and I agree as follows.       Gab Walden MD  Date of Service (when I saw the patient): 02/22/23    [facility]    Progress Note - Hospitalist Service, GOLD TEAM 9       Date of Admission:  2/20/2023    Assessment & Plan   Jenn Aparicio is a 67 year old female with HTN, type 2 diabetes c/b neuropathy, and COPD admitted on 2/20/2023 for acute hypoxic hypercapneic respiratory failure most likely 2/2 COPD exacerbation     Changes today:   - Bowel regimen  - High intensity sliding scale insulin  - Patient should be on BIPAP overnight and PRN during the day for work of breathing  - Follow up alpha1 anti-trypson     COPD exacerbation  Acute hypoxic hypercapneic respiratory failure  Presented to ED w/ increased SOB and dyspnea developing over the weekend, consistent with COPD exacerbation. Hx of COPD on  3L-4L O2 at baseline, in setting of 40 pack year smoking history, currently smoking. PFTs 12/2020 with FEV1 24%, FVC 44%, FEV1/FVC 44%, DLCO 38%. Of note, this is her 3rd COPD exacerbation requiring hospitalization in past 2 months (1/1-1/4, 2/3-2/9). PTA regimen is trelegy with SEAN PRN. Initiated Lifesaver 2000 at most recent discharge, unclear yet if effective. Unclear trigger for this exacerbation with negative flu, COVID, RSV, RVP, no evidence of pneumonia on CXR, negative D-dimer, BNP and troponin wnl on admission. Could consider steroid rebound vs environmental exposure as patient did stop steroids for prior exacerbation several days ago and reported dyspnea increased after exposure to cleaning products overnight the weekend. S/p IV methylpred 125mg x2 (1 in ED, 1 on floor), azithro 500mg, and ceftri 2g in ED. Respiratory status improved, and now appears to be close to baseline.   - Pulmonary consult, appreciate recs   - PO Prednisone 40mg x7 days, 30mgx7, 20mgx7   - PO Azithro 250mg indefinitely   - Duoneb Q6H (SEAN + Anticholinergic)   - Acetylcysteine neb BID   - Albuterol BID   - Follow up sputum culture   - Follow up alpha1 anti-trypson   - Note: roflumilast would be covered by insurance, pulmonary will discuss   starting this. Anticipate would be easier to start in the outpatient setting.   - Will need outpatient pulmonary follow up and sleep study  - Inpatient COPD consult, appreciate recs  - Intrapulmonary percussive therapy with RT  - Most recent CT chest 2/4/23. 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 Follows with Dr. Leung as outpatient, on surveillance imaging with previously stable disease.  "Per onc note 2/21, patient has growing pulmonary nodule seen on CT 2/4/23, which she will follow up on with Dr. Leung in the outpatient setting.  - Onc consulted as LUKE  - Next outpt follow up with Dr. Leung 3/1/23 with repeat CT at that time     Type II Diabetes mellitus c/b neuropathy, not on long term insulin  HbA1c 9.4 1/23/23. PTA regimen is Jardiance 25mg daily and Glipizide 5mg BID. Follows with endocrine as outpatient. Hyperglycemia overnight with BG into 200s, suspect component of steroid induced hyperglycemia.  - Goal BGS while inpatient 140-180  - High intensity SSI  - AC and HS checks w hypoglycemia protocol ordered.   - Continue to monitor  - PTA lyrica 150mg BID for neuropathy    Headaches  Patient reporting new headaches- fronto-temporal pressure, associated with light sensitivity starting since discharged home on prednisone after last hospitalization. Etiology could be medication related or dehydration with reportedly poor PO intake at home, but could also consider intracranial metastases given history of lung cancer. Will treat symptomatic now, but could consider   - Non-con head CT to evaluate for intracranial disease.   - PRN tylenol for pain    Constipation  - Senna and miralax for bowel regimen  - Continue to monitor     HTN  - Continue 10mg amlodipine              - Could be contributing to patient's non-pitting pedal edema. PCP to consider switching anti-HTN agent     GERD   Dysphagia  - Protonix 40mg BID    Pedal edema  Non-pitting pedal edema, possibly secondary to amlodipine.  - Continue to monitor      Allergies  Epic alerts her chart for \"glucagon\" as contraindicated, because patient has an allergy listed to \"colon care\". Unclear why there is a contraindication. Spoke with pharmacist who notes to order glucagon for now and they will review.  - Similarly, ASA 325mg is listed as an allergy, but she takes 81mg daily. Continued for inpatient    Home Meds  Pharm med rec placed to review " all medications        Diet: Regular Diet Adult    DVT Prophylaxis: Enoxaparin (Lovenox) SQ  Andrade Catheter: Not present  Fluids: None  Lines: None     Cardiac Monitoring: None  Code Status: No CPR- Pre-arrest intubation OK        Disposition Plan     Expected Discharge Date: 02/24/2023                The patient's care was discussed with the Attending Physician, Dr. Gab Walden.    Radha Griffinman  Medical Student  Hospitalist Service, GOLD TEAM 9  [facility]  Securely message with Vocera (more info)  Text page via [employer] Paging/Directory   See signed in provider for up to date coverage information  ______________________________________________________________________    Interval History   No acute events overnight. Nursing notes reviewed. Tolerated NC overnight.     This morning, Jenn is feeling well. She wants to go home. She reports she only has shortness of breath when oxygen is taken off. She is eating well. Last bowel movement the day before admission. She is making good urine.     She is amenable to staying in the hospital for another day or two to continue monitoring respiratory status given this is her third hospitalization for a COPD exacerbation in the past 2 months.     Physical Exam   Vital Signs: Temp: 97.9  F (36.6  C) Temp src: Oral BP: 139/69 Pulse: 96   Resp: 18 SpO2: 98 % O2 Device: Nasal cannula Oxygen Delivery: 3 LPM  Weight: 197 lbs 4.8 oz    General Appearance: Awake, alert, and oriented. Sitting up in bed, comfortable appearing.   Respiratory: Breathing comfortably with 3L NC. No increased work of breathing. No use of accessory respiratory muscles. Respiratory rate appropriate. Able to hold conversation without getting tachypneic or dyspneic. On auscultation, diffuse wheezes heard.   Cardiovascular: RRR, well perfused.   GI: Non-distended abdomen.     Data     I have personally reviewed the following data over the past 24 hrs:    10.8  \    12.4   / 164     144 105 16.4 /  143 (H)   4.2 28 0.54 \       Imaging results reviewed over the past 24 hrs:   No results found for this or any previous visit (from the past 24 hour(s)).

## 2023-02-22 NOTE — PROGRESS NOTES
[facility] Pulmonology Follow up    Jenn Aparicio  MRN: [id number]  : 1955    Date of Admission: 2023  Date of Service: 2023    Assessment:  Acute exacerbation of asthma/COPD overlap syndrome  Acute on chronic hypoxic hypercarbic respiratory failure  Hx multiple lung cancers (Adenocarcinoma s/p RLL lobectomy , RUL nodule 2020 s/p SBRT)    Ms. Aparicio has COPD/asthma overlap, with emphysematous changes on CT and chronic sputum production. Unclear etiology of emphysema as she reports never smoking, but has been exposed to second hand smoke x years. X-[id number] in process. She also has borderline reactivity on prior PFTs, and eosinophilia, in addition to noting triggers to strong scents, dust which makes us concerned for an uncontrolled asthma component. There are many complicating factors to this presentation (difficult to empty nebulizer vials, [id number] running out of battery/no adjusted yet, airway clearance with band instead of actual vest therapy, etc) however we would recommend starting medication therapy with roflumilast and daily azithro given frequent exacerbations in the last 2 months.    Recommendations:  -- start roflumilast, 250mg daily x 4 weeks, then increase to 500mg daily if tolerating  -- continue chronic azithromycin therapy. Needs to have repeat EKG at pulmonary clinic follow up, and audiogram in 3-6 months  -- prior to pulmonary clinic visit, should have full PFTs and FENO, as well as CBC with diff and IgE. If evidence of uncontrolled asthma, would consider biologic therapy.  -- attempting to add IgE to admission labs, however if this is not possible disregard result as she has been on steroids  -- alpha-1-antitrypsin in process  -- encouraged HEPA filter for home, and wearing a mask when around sick contacts, dust, cleaning supplies, or other triggers  -- continue nebs with BID mucomyst + albuterol, prn duonebs  -- steroid taper as outlined, prednisone 40 mg  x 7d, 30 mg x 7d, then 20mg x 7d and off    -- airway clearance with vest at home currently, consider metanebs if she tolerates this better  -- PAP with [id number], appreciate COPD RT help in selecting appropriate devices for day/night use  -- outpatient sleep evaluation  -- supplemental oxygen as needed    Subjective:  Doing ok from a breathing standpoint, would like to go home. Notes that her bed area has been crowded with multiple devices (PAP, metaneb, IV pole, walker) making it hard for her to maneuver in her space.    Review of systems: focused ROS negative except as noted.    Objective:  BP (!) 143/69 (BP Location: Right arm)   Pulse 88   Temp 98.7  F (37.1  C) (Oral)   Resp 18   Wt 89.5 kg (197 lb 4.8 oz)   SpO2 98%   BMI 31.85 kg/m      Gen: in no acute distress, sitting upright in bed  Pulm: no increased work of breathing, diminished bilaterally but improved aeration compared to prior, no wheezing appreciated  CV: regular rate, no significant peripheral edema  MSK: no gross deformities, trace lower extremity edema   Integ: no rashes or lesions appreciated  Neuro: speech clear, hearing grossly intact   Psych: calm, appropriate    Data:  I have personally reviewed new data.    Micaela Siddiqi DO  Pulmonary fellow  Pager: [phone]

## 2023-02-22 NOTE — PROGRESS NOTES
Vital signs:  Temp: 98.3  F (36.8  C) Temp src: Oral BP: (!) 142/68 Pulse: 104   Resp: 21 SpO2: 96 % O2 Device: Nasal cannula Oxygen Delivery: 3 LPM          Nurse report given to Avis on 7B.  Patient belongings, chart and medications sent with patient along with bipap and nebulizer provided by RT.

## 2023-02-23 LAB
ALLEN'S TEST: YES
ANION GAP SERPL CALCULATED.3IONS-SCNC: 12 MMOL/L (ref 7–15)
BASE EXCESS BLDA CALC-SCNC: 5.6 MMOL/L (ref -9–1.8)
BASOPHILS # BLD AUTO: 0 10E3/UL (ref 0–0.2)
BASOPHILS NFR BLD AUTO: 0 %
BUN SERPL-MCNC: 10.4 MG/DL (ref 8–23)
CALCIUM SERPL-MCNC: 8.7 MG/DL (ref 8.8–10.2)
CHLORIDE SERPL-SCNC: 103 MMOL/L (ref 98–107)
CREAT SERPL-MCNC: 0.54 MG/DL (ref 0.51–0.95)
DEPRECATED HCO3 PLAS-SCNC: 27 MMOL/L (ref 22–29)
EOSINOPHIL # BLD AUTO: 0.1 10E3/UL (ref 0–0.7)
EOSINOPHIL NFR BLD AUTO: 1 %
ERYTHROCYTE [DISTWIDTH] IN BLOOD BY AUTOMATED COUNT: 14.3 % (ref 10–15)
GFR SERPL CREATININE-BSD FRML MDRD: >90 ML/MIN/1.73M2
GLUCOSE BLDC GLUCOMTR-MCNC: 105 MG/DL (ref 70–99)
GLUCOSE BLDC GLUCOMTR-MCNC: 197 MG/DL (ref 70–99)
GLUCOSE BLDC GLUCOMTR-MCNC: 240 MG/DL (ref 70–99)
GLUCOSE BLDC GLUCOMTR-MCNC: 264 MG/DL (ref 70–99)
GLUCOSE SERPL-MCNC: 170 MG/DL (ref 70–99)
HCO3 BLD-SCNC: 33 MMOL/L (ref 21–28)
HCT VFR BLD AUTO: 47.2 % (ref 35–47)
HGB BLD-MCNC: 14.2 G/DL (ref 11.7–15.7)
IMM GRANULOCYTES # BLD: 0.1 10E3/UL
IMM GRANULOCYTES NFR BLD: 1 %
LYMPHOCYTES # BLD AUTO: 1.9 10E3/UL (ref 0.8–5.3)
LYMPHOCYTES NFR BLD AUTO: 22 %
MCH RBC QN AUTO: 27.5 PG (ref 26.5–33)
MCHC RBC AUTO-ENTMCNC: 30.1 G/DL (ref 31.5–36.5)
MCV RBC AUTO: 92 FL (ref 78–100)
MONOCYTES # BLD AUTO: 0.4 10E3/UL (ref 0–1.3)
MONOCYTES NFR BLD AUTO: 4 %
NEUTROPHILS # BLD AUTO: 6.2 10E3/UL (ref 1.6–8.3)
NEUTROPHILS NFR BLD AUTO: 72 %
NRBC # BLD AUTO: 0 10E3/UL
NRBC BLD AUTO-RTO: 0 /100
O2/TOTAL GAS SETTING VFR VENT: 0 %
PCO2 BLD: 58 MM HG (ref 35–45)
PH BLD: 7.36 [PH] (ref 7.35–7.45)
PLATELET # BLD AUTO: 179 10E3/UL (ref 150–450)
PO2 BLD: 82 MM HG (ref 80–105)
POTASSIUM SERPL-SCNC: 3.8 MMOL/L (ref 3.4–5.3)
RBC # BLD AUTO: 5.16 10E6/UL (ref 3.8–5.2)
SODIUM SERPL-SCNC: 142 MMOL/L (ref 136–145)
WBC # BLD AUTO: 8.7 10E3/UL (ref 4–11)

## 2023-02-23 PROCEDURE — 94660 CPAP INITIATION&MGMT: CPT

## 2023-02-23 PROCEDURE — 82310 ASSAY OF CALCIUM: CPT | Performed by: INTERNAL MEDICINE

## 2023-02-23 PROCEDURE — 36600 WITHDRAWAL OF ARTERIAL BLOOD: CPT

## 2023-02-23 PROCEDURE — 250N000013 HC RX MED GY IP 250 OP 250 PS 637: Performed by: HOSPITALIST

## 2023-02-23 PROCEDURE — 250N000011 HC RX IP 250 OP 636: Performed by: HOSPITALIST

## 2023-02-23 PROCEDURE — 85004 AUTOMATED DIFF WBC COUNT: CPT | Performed by: INTERNAL MEDICINE

## 2023-02-23 PROCEDURE — 250N000012 HC RX MED GY IP 250 OP 636 PS 637: Performed by: INTERNAL MEDICINE

## 2023-02-23 PROCEDURE — 250N000013 HC RX MED GY IP 250 OP 250 PS 637: Performed by: INTERNAL MEDICINE

## 2023-02-23 PROCEDURE — 82803 BLOOD GASES ANY COMBINATION: CPT | Performed by: INTERNAL MEDICINE

## 2023-02-23 PROCEDURE — 36415 COLL VENOUS BLD VENIPUNCTURE: CPT | Performed by: INTERNAL MEDICINE

## 2023-02-23 PROCEDURE — 250N000009 HC RX 250: Performed by: INTERNAL MEDICINE

## 2023-02-23 PROCEDURE — 999N000157 HC STATISTIC RCP TIME EA 10 MIN

## 2023-02-23 PROCEDURE — 120N000002 HC R&B MED SURG/OB UMMC

## 2023-02-23 PROCEDURE — 94640 AIRWAY INHALATION TREATMENT: CPT | Mod: 76

## 2023-02-23 PROCEDURE — 94799 UNLISTED PULMONARY SVC/PX: CPT

## 2023-02-23 PROCEDURE — 999N000033 HC STATISTIC CHRONIC PULMONARY DISEASE SPECIALIST

## 2023-02-23 RX ORDER — CALCIUM CARBONATE 500(1250)
500 TABLET ORAL 2 TIMES DAILY WITH MEALS
Status: DISCONTINUED | OUTPATIENT
Start: 2023-02-23 | End: 2023-02-23

## 2023-02-23 RX ORDER — MULTIPLE VITAMINS W/ MINERALS TAB 9MG-400MCG
1 TAB ORAL DAILY
Status: DISCONTINUED | OUTPATIENT
Start: 2023-02-23 | End: 2023-02-24 | Stop reason: HOSPADM

## 2023-02-23 RX ORDER — CALCIUM CARBONATE 500(1250)
500 TABLET ORAL ONCE
Status: COMPLETED | OUTPATIENT
Start: 2023-02-23 | End: 2023-02-23

## 2023-02-23 RX ADMIN — NICOTINE 1 PATCH: 14 PATCH, EXTENDED RELEASE TRANSDERMAL at 08:07

## 2023-02-23 RX ADMIN — PREDNISONE 40 MG: 20 TABLET ORAL at 07:54

## 2023-02-23 RX ADMIN — PREGABALIN 150 MG: 75 CAPSULE ORAL at 07:54

## 2023-02-23 RX ADMIN — CETIRIZINE HYDROCHLORIDE 10 MG: 10 TABLET, FILM COATED ORAL at 07:54

## 2023-02-23 RX ADMIN — ACETYLCYSTEINE 4 ML: 100 SOLUTION ORAL; RESPIRATORY (INHALATION) at 09:17

## 2023-02-23 RX ADMIN — FLUTICASONE PROPIONATE 1 SPRAY: 50 SPRAY, METERED NASAL at 07:55

## 2023-02-23 RX ADMIN — PANTOPRAZOLE SODIUM 40 MG: 40 TABLET, DELAYED RELEASE ORAL at 22:14

## 2023-02-23 RX ADMIN — ENOXAPARIN SODIUM 40 MG: 40 INJECTION SUBCUTANEOUS at 10:36

## 2023-02-23 RX ADMIN — POLYETHYLENE GLYCOL 400 AND PROPYLENE GLYCOL 1 DROP: 4; 3 SOLUTION/ DROPS OPHTHALMIC at 16:04

## 2023-02-23 RX ADMIN — ACETAMINOPHEN 650 MG: 325 TABLET, FILM COATED ORAL at 06:59

## 2023-02-23 RX ADMIN — PREGABALIN 150 MG: 75 CAPSULE ORAL at 22:14

## 2023-02-23 RX ADMIN — KETOTIFEN FUMARATE 1 DROP: 0.35 SOLUTION/ DROPS OPHTHALMIC at 07:56

## 2023-02-23 RX ADMIN — IPRATROPIUM BROMIDE AND ALBUTEROL SULFATE 3 ML: 2.5; .5 SOLUTION RESPIRATORY (INHALATION) at 20:33

## 2023-02-23 RX ADMIN — IPRATROPIUM BROMIDE AND ALBUTEROL SULFATE 3 ML: 2.5; .5 SOLUTION RESPIRATORY (INHALATION) at 01:23

## 2023-02-23 RX ADMIN — CALCIUM 500 MG: 500 TABLET ORAL at 17:45

## 2023-02-23 RX ADMIN — AZITHROMYCIN MONOHYDRATE 250 MG: 250 TABLET ORAL at 07:54

## 2023-02-23 RX ADMIN — PANTOPRAZOLE SODIUM 40 MG: 40 TABLET, DELAYED RELEASE ORAL at 07:54

## 2023-02-23 RX ADMIN — IPRATROPIUM BROMIDE AND ALBUTEROL SULFATE 3 ML: 2.5; .5 SOLUTION RESPIRATORY (INHALATION) at 13:55

## 2023-02-23 RX ADMIN — AMLODIPINE BESYLATE 10 MG: 10 TABLET ORAL at 07:55

## 2023-02-23 RX ADMIN — IPRATROPIUM BROMIDE AND ALBUTEROL SULFATE 3 ML: 2.5; .5 SOLUTION RESPIRATORY (INHALATION) at 09:17

## 2023-02-23 RX ADMIN — ACETYLCYSTEINE 4 ML: 100 SOLUTION ORAL; RESPIRATORY (INHALATION) at 20:34

## 2023-02-23 RX ADMIN — POLYETHYLENE GLYCOL 400 AND PROPYLENE GLYCOL 1 DROP: 4; 3 SOLUTION/ DROPS OPHTHALMIC at 22:21

## 2023-02-23 RX ADMIN — ALBUTEROL SULFATE 2 PUFF: 90 AEROSOL, METERED RESPIRATORY (INHALATION) at 07:51

## 2023-02-23 RX ADMIN — ALBUTEROL SULFATE 2 PUFF: 90 AEROSOL, METERED RESPIRATORY (INHALATION) at 22:16

## 2023-02-23 RX ADMIN — ASPIRIN 81 MG: 81 TABLET ORAL at 07:54

## 2023-02-23 RX ADMIN — KETOTIFEN FUMARATE 1 DROP: 0.35 SOLUTION/ DROPS OPHTHALMIC at 22:21

## 2023-02-23 RX ADMIN — Medication 1 TABLET: at 16:48

## 2023-02-23 RX ADMIN — POLYETHYLENE GLYCOL 400 AND PROPYLENE GLYCOL 1 DROP: 4; 3 SOLUTION/ DROPS OPHTHALMIC at 12:17

## 2023-02-23 RX ADMIN — POLYETHYLENE GLYCOL 400 AND PROPYLENE GLYCOL 1 DROP: 4; 3 SOLUTION/ DROPS OPHTHALMIC at 07:55

## 2023-02-23 ASSESSMENT — ACTIVITIES OF DAILY LIVING (ADL)
ADLS_ACUITY_SCORE: 30

## 2023-02-23 NOTE — PROGRESS NOTES
[facility]    Medicine Progress Note - Hospitalist Service, GOLD TEAM 9    Date of Admission:  2/20/2023    Assessment & Plan   Jenn Aparicio is a 67 year old female with HTN, type 2 diabetes c/b neuropathy, and COPD admitted on 2/20/2023 for acute hypoxic hypercapneic respiratory failure most likely 2/2 COPD exacerbation     Changes today:   - Bowel regimen  - Patient should be on BIPAP overnight and PRN during the day for work of breathing  - Follow up alpha1 anti-trypsin  -case d/w home RT. Anticipate discharge in am      COPD exacerbation  Acute hypoxic hypercapneic respiratory failure  Presented to ED w/ increased SOB and dyspnea developing over the weekend, consistent with COPD exacerbation. Hx of COPD on 3L-4L O2 at baseline, in setting of 40 pack year smoking history, currently smoking. PFTs 12/2020 with FEV1 24%, FVC 44%, FEV1/FVC 44%, DLCO 38%. Of note, this is her 3rd COPD exacerbation requiring hospitalization in past 2 months (1/1-1/4, 2/3-2/9). PTA regimen is trelegy with SEAN PRN. Initiated Lifesaver 2000 at most recent discharge, unclear yet if effective. Unclear trigger for this exacerbation with negative flu, COVID, RSV, RVP, no evidence of pneumonia on CXR, negative D-dimer, BNP and troponin wnl on admission. Could consider steroid rebound vs environmental exposure as patient did stop steroids for prior exacerbation several days ago and reported dyspnea increased after exposure to cleaning products overnight the weekend. S/p IV methylpred 125mg x2 (1 in ED, 1 on floor), azithro 500mg, and ceftri 2g in ED. Respiratory status improved, and now appears to be close to baseline.   - Pulmonary consult, appreciate recs   - PO Prednisone 40mg x7 days, 30mgx7, 20mgx7   - PO Azithro 250mg indefinitely   - Duoneb Q6H (SEAN + Anticholinergic)   - Acetylcysteine neb BID   - Albuterol BID   - Follow up sputum culture   - Follow up alpha1 anti-trypson   - Note:  roflumilast would be covered by insurance, pulmonary will discuss   starting this. Anticipate would be easier to start in the outpatient setting.   - Will need outpatient pulmonary follow up and sleep study  - Inpatient COPD consult, appreciate recs  - Intrapulmonary percussive therapy with RT  - Most recent CT chest 2/4/23. 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 Follows with Dr. Leung as outpatient, on surveillance imaging with previously stable disease. Per onc note 2/21, patient has growing pulmonary nodule seen on CT 2/4/23, which she will follow up on with Dr. Leung in the outpatient setting.  - Onc consulted as LUKE  - Next outpt follow up with Dr. Leung 3/1/23 with repeat CT at that time     Type II Diabetes mellitus c/b neuropathy, not on long term insulin  HbA1c 9.4 1/23/23. PTA regimen is Jardiance 25mg daily and Glipizide 5mg BID. Follows with endocrine as outpatient. Hyperglycemia overnight with BG into 200s, suspect component of steroid induced hyperglycemia.  - Goal BGS while inpatient 140-180  - High intensity SSI  - AC and HS checks w hypoglycemia protocol ordered.   - Continue to monitor  - PTA lyrica 150mg BID for neuropathy    Headaches  Patient reporting new headaches- fronto-temporal pressure, associated with light sensitivity starting since discharged home on prednisone after last hospitalization. Etiology could be medication related or dehydration with reportedly poor PO intake at home, but could also consider intracranial metastases given history of lung cancer. Will treat symptomatic now, but could consider   - Non-con head CT to evaluate for intracranial disease.   - PRN tylenol  "for pain    Constipation  - Senna and miralax for bowel regimen  - Continue to monitor     HTN  - Continue 10mg amlodipine              - Could be contributing to patient's non-pitting pedal edema. PCP to consider switching anti-HTN agent     GERD   Dysphagia  - Protonix 40mg BID    Pedal edema  Non-pitting pedal edema, possibly secondary to amlodipine.  - Continue to monitor      Allergies  Epic alerts her chart for \"glucagon\" as contraindicated, because patient has an allergy listed to \"colon care\". Unclear why there is a contraindication. Spoke with pharmacist who notes to order glucagon for now and they will review.  - Similarly, ASA 325mg is listed as an allergy, but she takes 81mg daily. Continued for inpatient    Home Meds  Pharm med rec placed to review all medications        Diet: Regular Diet Adult    DVT Prophylaxis: Enoxaparin (Lovenox) SQ  Andrade Catheter: Not present  Fluids: None  Lines: None     Cardiac Monitoring: None  Code Status: No CPR- Pre-arrest intubation OK             Diet: Regular Diet Adult  NPO for Medical/Clinical Reasons Except for: Meds  Snacks/Supplements Adult: Ensure Max Protein (bariatric); Between Meals    DVT Prophylaxis: Enoxaparin (Lovenox) SQ  Andrade Catheter: Not present  Lines: None     Cardiac Monitoring: None  Code Status: No CPR- Pre-arrest intubation OK      Clinically Significant Risk Factors                       # DMII: A1C = 9.4 % (Ref range: <5.7 %) within past 6 months, PRESENT ON ADMISSION  # Obesity: Estimated body mass index is 31.85 kg/m  as calculated from the following:    Height as of 2/4/23: 1.676 m (5' 6\").    Weight as of this encounter: 89.5 kg (197 lb 4.8 oz)., PRESENT ON ADMISSION         Disposition Plan      Expected Discharge Date: 02/24/2023      Destination: home with help/services            Gab Walden MD  Hospitalist Service, GOLD TEAM [address] [place] [address]  Securely message with [employer] (more " info)  Text page via [employer] Paging/Directory   See signed in provider for up to date coverage information  ______________________________________________________________________    Interval History   No     Physical Exam   Vital Signs: Temp: 98.3  F (36.8  C) Temp src: Oral BP: (!) 142/72 Pulse: 101   Resp: 18 SpO2: 95 % O2 Device: Nasal cannula Oxygen Delivery: 3 LPM  Weight: 197 lbs 4.8 oz    General Appearance: A&Ox3 in NAD  Respiratory: clear  Cardiovascular: regular  GI: soft nt  Skin: wwp  Other: No ishmael     Medical Decision Making         MINUTES SPENT BY ME on the date of service doing chart review, history, exam, documentation & further activities per the note.      Data

## 2023-02-23 NOTE — PROGRESS NOTES
Blood pressure (!) 146/75, pulse 98, temperature 98.3  F (36.8  C), temperature source Oral, resp. rate 18, weight 89.5 kg (197 lb 4.8 oz), SpO2 98 %, not currently breastfeeding.    Activity: Independent  Neuros:  AOX4,  Makes needs known  Cardiac: HTN/Tachy  Respiratory: WDL RA 98%, SOB w/activity  GI/: Voiding spontaneous, BM  Diet: Regular, tolerating  Skin/Incisions/Drains: Edema  BLE   Lines: Lt PIV SL  Pain: Headache, Tylenol  Plan: Continue with POC,  Patient needs to be on BIPAP at HS @ MD and for labs. If she refuses BIPAP, contact MD.

## 2023-02-23 NOTE — PROGRESS NOTES
RT Attempted to do the Abg but Pt was agitated. Pt said he will do it for the last time but become uncooperative during the process.     /54 (BP Location: Right arm)   Pulse 92   Temp 98.7  F (37.1  C) (Oral)   Resp 18   Wt 89.5 kg (197 lb 4.8 oz)   SpO2 98%   BMI 31.85 kg/m        Abdon Rousseau, RT on 2/23/2023 at 1:58 AM

## 2023-02-24 VITALS
SYSTOLIC BLOOD PRESSURE: 132 MMHG | RESPIRATION RATE: 18 BRPM | TEMPERATURE: 98.3 F | DIASTOLIC BLOOD PRESSURE: 64 MMHG | WEIGHT: 197.3 LBS | OXYGEN SATURATION: 95 % | HEART RATE: 92 BPM | BODY MASS INDEX: 31.85 KG/M2

## 2023-02-24 LAB
ANION GAP SERPL CALCULATED.3IONS-SCNC: 12 MMOL/L (ref 7–15)
BASOPHILS # BLD AUTO: 0 10E3/UL (ref 0–0.2)
BASOPHILS NFR BLD AUTO: 0 %
BUN SERPL-MCNC: 14.5 MG/DL (ref 8–23)
CALCIUM SERPL-MCNC: 9.2 MG/DL (ref 8.8–10.2)
CHLORIDE SERPL-SCNC: 103 MMOL/L (ref 98–107)
CREAT SERPL-MCNC: 0.59 MG/DL (ref 0.51–0.95)
DEPRECATED HCO3 PLAS-SCNC: 28 MMOL/L (ref 22–29)
EOSINOPHIL # BLD AUTO: 0.2 10E3/UL (ref 0–0.7)
EOSINOPHIL NFR BLD AUTO: 3 %
ERYTHROCYTE [DISTWIDTH] IN BLOOD BY AUTOMATED COUNT: 14.3 % (ref 10–15)
GFR SERPL CREATININE-BSD FRML MDRD: >90 ML/MIN/1.73M2
GLUCOSE BLDC GLUCOMTR-MCNC: 131 MG/DL (ref 70–99)
GLUCOSE BLDC GLUCOMTR-MCNC: 209 MG/DL (ref 70–99)
GLUCOSE BLDC GLUCOMTR-MCNC: 252 MG/DL (ref 70–99)
GLUCOSE SERPL-MCNC: 134 MG/DL (ref 70–99)
HCT VFR BLD AUTO: 43.4 % (ref 35–47)
HGB BLD-MCNC: 13 G/DL (ref 11.7–15.7)
IGE SERPL-ACNC: 16 KU/L (ref 0–114)
IMM GRANULOCYTES # BLD: 0 10E3/UL
IMM GRANULOCYTES NFR BLD: 0 %
LYMPHOCYTES # BLD AUTO: 3.4 10E3/UL (ref 0.8–5.3)
LYMPHOCYTES NFR BLD AUTO: 40 %
MCH RBC QN AUTO: 27.5 PG (ref 26.5–33)
MCHC RBC AUTO-ENTMCNC: 30 G/DL (ref 31.5–36.5)
MCV RBC AUTO: 92 FL (ref 78–100)
MONOCYTES # BLD AUTO: 0.6 10E3/UL (ref 0–1.3)
MONOCYTES NFR BLD AUTO: 7 %
NEUTROPHILS # BLD AUTO: 4.2 10E3/UL (ref 1.6–8.3)
NEUTROPHILS NFR BLD AUTO: 50 %
NRBC # BLD AUTO: 0 10E3/UL
NRBC BLD AUTO-RTO: 0 /100
PLATELET # BLD AUTO: 180 10E3/UL (ref 150–450)
POTASSIUM SERPL-SCNC: 3.7 MMOL/L (ref 3.4–5.3)
RBC # BLD AUTO: 4.73 10E6/UL (ref 3.8–5.2)
SODIUM SERPL-SCNC: 143 MMOL/L (ref 136–145)
WBC # BLD AUTO: 8.5 10E3/UL (ref 4–11)

## 2023-02-24 PROCEDURE — 85025 COMPLETE CBC W/AUTO DIFF WBC: CPT | Performed by: INTERNAL MEDICINE

## 2023-02-24 PROCEDURE — 250N000013 HC RX MED GY IP 250 OP 250 PS 637: Performed by: INTERNAL MEDICINE

## 2023-02-24 PROCEDURE — 250N000011 HC RX IP 250 OP 636: Performed by: HOSPITALIST

## 2023-02-24 PROCEDURE — 94640 AIRWAY INHALATION TREATMENT: CPT

## 2023-02-24 PROCEDURE — 80048 BASIC METABOLIC PNL TOTAL CA: CPT | Performed by: INTERNAL MEDICINE

## 2023-02-24 PROCEDURE — 250N000009 HC RX 250: Performed by: INTERNAL MEDICINE

## 2023-02-24 PROCEDURE — 36415 COLL VENOUS BLD VENIPUNCTURE: CPT | Performed by: INTERNAL MEDICINE

## 2023-02-24 PROCEDURE — 94640 AIRWAY INHALATION TREATMENT: CPT | Mod: 76

## 2023-02-24 PROCEDURE — 999N000157 HC STATISTIC RCP TIME EA 10 MIN

## 2023-02-24 PROCEDURE — 250N000013 HC RX MED GY IP 250 OP 250 PS 637: Performed by: HOSPITALIST

## 2023-02-24 PROCEDURE — 250N000012 HC RX MED GY IP 250 OP 636 PS 637: Performed by: INTERNAL MEDICINE

## 2023-02-24 PROCEDURE — 94660 CPAP INITIATION&MGMT: CPT

## 2023-02-24 RX ORDER — AMOXICILLIN 250 MG
1 CAPSULE ORAL AT BEDTIME
Qty: 30 TABLET | Refills: 0 | Status: SHIPPED | OUTPATIENT
Start: 2023-02-24 | End: 2023-03-26

## 2023-02-24 RX ORDER — PREDNISONE 20 MG/1
10 TABLET ORAL DAILY
Qty: 32 TABLET | Refills: 0 | Status: SHIPPED | OUTPATIENT
Start: 2023-02-25 | End: 2023-05-04

## 2023-02-24 RX ORDER — ACETYLCYSTEINE 100 MG/ML
4 SOLUTION ORAL; RESPIRATORY (INHALATION) 4 TIMES DAILY
Qty: 480 ML | Refills: 0 | Status: SHIPPED | OUTPATIENT
Start: 2023-02-24 | End: 2023-02-24

## 2023-02-24 RX ORDER — BUPROPION HYDROCHLORIDE 150 MG/1
150 TABLET ORAL EVERY MORNING
Qty: 30 TABLET | Refills: 0 | Status: SHIPPED | OUTPATIENT
Start: 2023-02-24 | End: 2023-03-16

## 2023-02-24 RX ORDER — ALBUTEROL SULFATE 0.83 MG/ML
2.5 SOLUTION RESPIRATORY (INHALATION) 4 TIMES DAILY
Qty: 360 ML | Refills: 0 | Status: SHIPPED | OUTPATIENT
Start: 2023-02-24 | End: 2023-03-16

## 2023-02-24 RX ORDER — AZITHROMYCIN 250 MG/1
250 TABLET, FILM COATED ORAL DAILY
Qty: 30 TABLET | Refills: 0 | Status: SHIPPED | OUTPATIENT
Start: 2023-02-25 | End: 2023-03-16

## 2023-02-24 RX ORDER — ACETYLCYSTEINE 100 MG/ML
4 SOLUTION ORAL; RESPIRATORY (INHALATION) 4 TIMES DAILY
Qty: 480 ML | Refills: 0 | Status: SHIPPED | OUTPATIENT
Start: 2023-02-24 | End: 2023-03-16

## 2023-02-24 RX ADMIN — ENOXAPARIN SODIUM 40 MG: 40 INJECTION SUBCUTANEOUS at 10:33

## 2023-02-24 RX ADMIN — PREGABALIN 150 MG: 75 CAPSULE ORAL at 07:52

## 2023-02-24 RX ADMIN — AZITHROMYCIN MONOHYDRATE 250 MG: 250 TABLET ORAL at 07:54

## 2023-02-24 RX ADMIN — POLYETHYLENE GLYCOL 400 AND PROPYLENE GLYCOL 1 DROP: 4; 3 SOLUTION/ DROPS OPHTHALMIC at 07:52

## 2023-02-24 RX ADMIN — AMLODIPINE BESYLATE 10 MG: 10 TABLET ORAL at 07:54

## 2023-02-24 RX ADMIN — PANTOPRAZOLE SODIUM 40 MG: 40 TABLET, DELAYED RELEASE ORAL at 07:53

## 2023-02-24 RX ADMIN — CETIRIZINE HYDROCHLORIDE 10 MG: 10 TABLET, FILM COATED ORAL at 07:53

## 2023-02-24 RX ADMIN — Medication 1 TABLET: at 07:53

## 2023-02-24 RX ADMIN — ALBUTEROL SULFATE 2 PUFF: 90 AEROSOL, METERED RESPIRATORY (INHALATION) at 08:00

## 2023-02-24 RX ADMIN — NICOTINE 1 PATCH: 14 PATCH, EXTENDED RELEASE TRANSDERMAL at 07:52

## 2023-02-24 RX ADMIN — IPRATROPIUM BROMIDE AND ALBUTEROL SULFATE 3 ML: 2.5; .5 SOLUTION RESPIRATORY (INHALATION) at 13:19

## 2023-02-24 RX ADMIN — ASPIRIN 81 MG: 81 TABLET ORAL at 07:53

## 2023-02-24 RX ADMIN — FLUTICASONE PROPIONATE 1 SPRAY: 50 SPRAY, METERED NASAL at 07:51

## 2023-02-24 RX ADMIN — PREDNISONE 40 MG: 20 TABLET ORAL at 07:53

## 2023-02-24 RX ADMIN — KETOTIFEN FUMARATE 1 DROP: 0.35 SOLUTION/ DROPS OPHTHALMIC at 07:51

## 2023-02-24 RX ADMIN — ACETYLCYSTEINE 4 ML: 100 SOLUTION ORAL; RESPIRATORY (INHALATION) at 07:35

## 2023-02-24 RX ADMIN — IPRATROPIUM BROMIDE AND ALBUTEROL SULFATE 3 ML: 2.5; .5 SOLUTION RESPIRATORY (INHALATION) at 07:35

## 2023-02-24 RX ADMIN — POLYETHYLENE GLYCOL 400 AND PROPYLENE GLYCOL 1 DROP: 4; 3 SOLUTION/ DROPS OPHTHALMIC at 12:07

## 2023-02-24 ASSESSMENT — ACTIVITIES OF DAILY LIVING (ADL)
ADLS_ACUITY_SCORE: 30

## 2023-02-24 NOTE — PLAN OF CARE
"Goal Outcome Evaluation: 1900-0700    Respiratory: WDL 3L NC, Denies SOB at rest, RUSSELL  Cardiac: WDL. Denies Chest pain.  Neuro: A&Ox4. Calls appropriately.   GI/: Voiding spontaneously. Last BM 2/20  Diet: regular   Skin: intact besides bruise on R arm and nicotine patch and BS monitor in left arm   Lines/drains: L PIV SL  Pain: declined pain meds pt \"wanted to rest\"  Labs: reviewed, pt now on ACHS  Activity: SBA  Plan: continue plan of care, sputum culture to be collected      "
"Patient discharging. Discharge paperwork was reviewed with patient. Patient expressed understanding. A copy was given to patient. Pt discharging home with home health. Transport arranged by Camelia - community healthcare worker. Discharge medications filled by [facility], picked up and delivered to room. All patient belongings were taken with patient.       Problem: Plan of Care - These are the overarching goals to be used throughout the patient stay.    Goal: Plan of Care Review  Description: The Plan of Care Review/Shift note should be completed every shift.  The Outcome Evaluation is a brief statement about your assessment that the patient is improving, declining, or no change.  This information will be displayed automatically on your shift note.  Outcome: Adequate for Care Transition  Goal: Patient-Specific Goal (Individualized)  Description: You can add care plan individualizations to a care plan. Examples of Individualization might be:  \"Parent requests to be called daily at 9am for status\", \"I have a hard time hearing out of my right ear\", or \"Do not touch me to wake me up as it startles me\".  Outcome: Adequate for Care Transition  Goal: Absence of Hospital-Acquired Illness or Injury  Outcome: Adequate for Care Transition  Intervention: Prevent Skin Injury  Recent Flowsheet Documentation  Taken 2/24/2023 0830 by TRENT TORRES  Body Position: position changed independently  Goal: Optimal Comfort and Wellbeing  Outcome: Adequate for Care Transition  Goal: Readiness for Transition of Care  Outcome: Adequate for Care Transition     Problem: Pain Acute  Goal: Optimal Pain Control and Function  Outcome: Adequate for Care Transition  Intervention: Prevent or Manage Pain  Recent Flowsheet Documentation  Taken 2/24/2023 0830 by TRENT TORRES  Medication Review/Management: medications reviewed     "
7B PT Deferral: Per discussion with OT patient mobilizing near baseline with 4WW and without acute PT needs. OT to continue to follow while in hospital.     
B/P: 123/54, T: 98.7, P: 92, R: 18 Denies pain. Declined CPAP overnight. Wheezes in bases. SOB with activity. AOx4. Appeared to sleep in between cares. Continue to monitor and notify MD with any significant changes.   
Goal Outcome Evaluation:      Plan of Care Reviewed With: patient    Overall Patient Progress: no changeOverall Patient Progress: no change    Cardiac: denies cardiac chest pain   Resp: 3 L NC at baseline   Neuro: A&Ox4, calm & cooperative   GI/: voiding spontaneously, +BS, no BM this shift   Diet: Regular    Skin: nicotine patch on R arm, edema in BLE  IV access: L PIV SL   Labs: Reviewed   Nausea: denies   Activity: Independent   Pain: c/o headache pain-PRN tylenol given w/ relief     Plan: possible use of BiPAP, pending on labs   New changes this shift: no new changes, continue POC     
Goal Outcome Evaluation:      Plan of Care Reviewed With: patient  BP (!) 143/69 (BP Location: Right arm)   Pulse 88   Temp 98.7  F (37.1  C) (Oral)   Resp 18   Wt 89.5 kg (197 lb 4.8 oz)   SpO2 100%   BMI 31.85 kg/m       Patient reports pain is tolerable with Tylenol. Abdomen soft with bowel sounds present, patient reports she is passing gas but no BM.  Up independently. Voiding adequate amounts of urine. Nicotine patch on right shoulder. Patient aware of needing sputum. Continue with POC.                  
Neuro: A&Ox4  Respiratory: 3L NC baseline. Tolerated Bipap through the whole night.   Cardiac: VSS  GI/: Voiding, not measuring. LBM 2/23  Diet: Regular  Pain: reported none  Skin: Intact  IV Access: left PIV SL  Labs: reviewed.   Activity: up as betsey  Plan: possible discharge today.        
Occupational Therapy Discharge Summary    Reason for therapy discharge:    Discharged to home with home therapy.    Progress towards therapy goal(s). See goals on Care Plan in [facility] electronic health record for goal details.  Goals partially met.  Barriers to achieving goals:   discharge from facility.    Therapy recommendation(s):    Continued therapy is recommended.  Rationale/Recommendations:  To increase independence and safety in I/ADLs and functional mobility in the home.      
Elective Induction

## 2023-02-24 NOTE — PROGRESS NOTES
CHW trying to schedule ride through pt's insurance at 7:15pm from room to home.      Camelia Dunn   7A/B Community Health Worker   Phone: [phone]

## 2023-02-24 NOTE — DISCHARGE SUMMARY
[facility]  Discharge Summary - Medicine & Pediatrics       Date of Admission:  2/20/2023  Date of Discharge:  2/24/2023  Discharging Provider: Gab Walden  Discharge Service: Hospitalist Service, GOLD TEAM 9    Discharge Diagnoses   COPD exacerbation  History of COPD  Acute on chronic hypoxic respiratory failure  Stage 1a RLL adenocarcinoma of the lung  Stage 1a RUL adenocarcinoma of the lung  Type II Diabetes Mellitus with neuropathy  Headache  Constipation  GERD  Hypertension  Pedal edema    Follow-ups Needed After Discharge   Follow-up Appointments     Adult [facility] Follow-up and recommended labs and tests      Follow up with primary care provider, Mitzi Nettles, within 7 days   for hospital follow- up.       Follow up with pulmonary (referral placed)     Follow up with oncology (scheduled)     Appointments on [place] and/or [facility] (with [facility] or [facility]   provider or service). Call [phone] if you haven't heard regarding   these appointments within 7 days of discharge.            PCP should follow up on Wellbutrin initiation and titrate as needed, type II diabetes with hyperglycemia exacerbated by steroids.    Unresulted Labs Ordered in the Past 30 Days of this Admission     Date and Time Order Name Status Description    2/22/2023  3:06 PM IgE In process     2/22/2023  7:55 AM Alpha 1 Antitrypsin In process       These results will be followed up by pulmonology clinic.     Discharge Disposition   Discharged to home  Condition at discharge: Stable    Hospital Course   Jenn Aparicio was admitted on 2/20/2023 for a COPD exacerbation. The following problems were addressed during her hospitalization:    COPD exacerbation  History of COPD  Acute on chronic hypoxic respiratory failure  History of smoking  Patient presented to ED with increased shortness of breath and dyspnea, consistent with COPD exacerbation. She has a history of COPD and is on  3L-4L O2 at baseline with an extensive smoking history (last smoked 2-3 weeks ago). Of note, this is her 3rd COPD exacerbation requiring hospitalization in past 2 months (1/1-1/4, 2/3-2/9). There was an unclear trigger for this exacerbation with negative flu, COVID, RSV, RVP, no evidence of pneumonia on CXR, negative D-dimer, and BNP and troponin wnl on admission. Sputum with culture was ordered, but not collected. Steroid rebound vs environmental exposures could be considered as the patient stopped steroids for her prior exacerbation several days before admit and reported dyspnea increased after exposure to cleaning products over the weekend before her admission.     She was hypercarbic and hypoxic on admission VBG with significnat improvement after several hours of BIPAP. In the ED, she received 1 dose ceftriaxone and 1 dose of azithromycin 500mg. She received IV methylprednisolone 125mg x2 and nebulizers with significant improving in respiratory status. She was transitioned from BIPAP to her baseline oxygen by hospital day 2. Pulmonology and COPD respiratory therapy were consulted. Per pulmonology, she was started on an oral prednisone taper, advised to take azithromycin 250mg daily indefinitely, and continued on mucomyst nebs, albuterol neds, and duonebs, which were optimized. She was recommended to start Trilogy NIV at home per COPD respiratory therapy, which was ordered for her during this hospitalization. Throughout her course, she was maintained on baseline O2 by NC during the day and BIPAP overnight, which she tolerated appropriately. Her respiratory status continued to improve throughout this hospitalization with the outlined therapies and returned to her baseline respiratory status on the day of discharge.     Patient confirmed that she has a long smoking history. She last smoked about 2-3 weeks ago. She has been using nicotine lozenges. She was restarted on Wellbutrin this hospitalization per her  request, as this was helpful to her in the past in managing anxiety around smoking. Her PCP can follow up on this and titrate Wellbutrin as needed.     Per pulmonology, she was recommended to start daliresp as an outpatient, which is covered by her insurance with a zero dollar copay. She was also advised to get a HEPA filter in her home and follow up with the pulmonology clinic as an outpatient with PFTs, FeNO, CBC with differential for eosinophils. Alpha1 anti-trypsin was ordered during this hospitalization and was pending at the time of discharge. The pulmonology clinic can follow up on this lab.     She was also recommended to complete an outpatient sleep study. A referral for this was placed.     Stage 1a RLL adenocarcinoma of the lung  Stage 1a RUL adenocarcinoma of the lung  Patient has a history of lung cancer as above diagnosed in January 2015, s/p RLL lobectomy in 2016 and s/p RUL SBRT January 2020. She follows with Dr. Leung as an outpatient, and has been on surveillance imaging with previously stable disease. Per onc note 2/21, the patient has growing pulmonary nodule seen on CT 2/4/23, which she will follow up on with Dr. Leung in the outpatient setting. She has a follow up appointment scheduled with Dr. Leung on 3/1/23 with a repeat CT at that time.     Type II Diabetes Mellitus with neuropathy  Patient has a history of Type II diabetes, complicated by neuropathy, on PTA Jardiance 25mg daily and glipizide 5mg BID. Her blood sugars were elevated into the 200s throughout this hospitalized, suspected from steroid induced hyperglycemia, and were managed with sliding scale insulin. She should continue her PTA regimen upon discharge. Her PCP can continue to monitor her elevated blood sugars while she continues on the steroid taper.     Headache  Patient complained of a new frontal headache associated with light sensitivity and floaters starting after she was started on PO prednisone on discharge from  her previous hospitalization. These could represent medication related headaches. Her headaches were managed with tylenol throughout this hospitalization. Given her history of lung cancer, head imaging could be considered in the future to rule out intracranial metastases if her headaches persist or worsen.     Constipation  Patient experienced constipation during this hospitalization, which was managed with senna and miralax.     GERD  Patient has a history of GERD, which was managed with Protonix BID throughout this hospitalization.     Hypertension  Pedal edema  Patient has a history of hypertension for which she takes amlodipine 10mg daily. This medication was continued throughout her hospitalization. Her blood pressures remained appropriate throughout this hospitalization. She was noted to have mild bilateral pedal edema, which could represent a side effect of amlodipine. Her PCP can follow up on this and consider alternative anti-hypertensives if indicated.     Consultations This Hospital Stay   IP RESPIRATORY CARE CHRONIC PULMONARY DISEASE SPECIALIST  PULMONARY GENERAL ADULT IP CONSULT  PHYSICAL THERAPY ADULT IP CONSULT  OCCUPATIONAL THERAPY ADULT IP CONSULT  MEDICATION HISTORY IP PHARMACY CONSULT  ONCOLOGY ADULT IP CONSULT  PHARMACY LIAISON FOR MEDICATION COVERAGE CONSULT  RESPIRATORY CARE IP CONSULT  CARE MANAGEMENT / SOCIAL WORK IP CONSULT    Code Status   No CPR- Pre-arrest intubation OK       The patient was discussed with Dr. Gab Rondon [address] [place] [address] [address] [address]  Phone: [phone]  ______________________________________________________________________    Physical Exam   Vital Signs: Temp: 98  F (36.7  C) Temp src: Oral BP: 132/57 Pulse: 94   Resp: 16 SpO2: 93 % O2 Device: Nasal cannula Oxygen Delivery: 3 LPM  Weight: 197 lbs 4.8 oz     Constitutional: Alert and oriented. Comfortable in bed.   Respiratory:   Breathing comfortably on 3L NC. No increased work of breathing. Intermittent cough, but no audible breath sounds, wheezes, or ronchi. On auscultation, prominent, diffuse wheezes are heard bilaterally.   Cardiovascular: RRR, well perfused.   GI: Soft, non-tender, non-distended. Normoactive bowel sounds.   Musculoskeletal: Spontaneously moves all extremities. 1+ pedal edema in feet bilaterally.   Neurologic: Awake, alert, and oriented. Cranial nerves grossly intact.       Primary Care Physician   Mitzi Nettles    Discharge Orders      Home Care Referral      Pulmonary Medicine Referral      Sleep Study Referral      Reason for your hospital stay    COPD exacerbation     Activity    Your activity upon discharge: activity as tolerated and no driving for today     Adult [facility] Follow-up and recommended labs and tests    Follow up with primary care provider, Mitzi Nettles, within 7 days for hospital follow- up.       Follow up with pulmonary (referral placed)     Follow up with oncology (scheduled)     Appointments on [place] and/or [facility] (with [facility] or [facility] provider or service). Call [phone] if you haven't heard regarding these appointments within 7 days of discharge.     Non Invasive Vent DME    I, the undersigned, certify that the above prescribed supplies are medically necessary for this patient and is both reasonable and necessary in reference to accepted standards of medical and necessary in reference to accepted standards of medical practice in the treatment of this patient's condition and is not prescribed as a convenience.      Diet    Follow this diet upon discharge: Orders Placed This Encounter      Snacks/Supplements Adult: Ensure Max Protein (bariatric); Between Meals      Regular Diet Adult      NPO for Medical/Clinical Reasons Except for: Meds       Significant Results and Procedures        Discharge Medications   Current Discharge Medication List      START taking these  medications    Details   azithromycin (ZITHROMAX) 250 MG tablet Take 1 tablet (250 mg) by mouth daily for 30 days  Qty: 30 tablet, Refills: 0    Associated Diagnoses: Chronic obstructive pulmonary disease, unspecified COPD type (H)      buPROPion (WELLBUTRIN XL) 150 MG 24 hr tablet Take 1 tablet (150 mg) by mouth every morning  Qty: 30 tablet, Refills: 0    Associated Diagnoses: Cigarette smoker      predniSONE (DELTASONE) 20 MG tablet Take 0.5 tablets (10 mg) by mouth daily  Qty: 32 tablet, Refills: 0    Comments: Take 2 tablets daily (40mg) until 2/28, then 1.5 tablets daily (30mg) 3/1 to 3/7, then 1 tablet daily (20mg) 3/8 to 3/14  Associated Diagnoses: Chronic obstructive pulmonary disease, unspecified COPD type (H)      senna-docusate (SENOKOT-S/PERICOLACE) 8.6-50 MG tablet Take 1 tablet by mouth At Bedtime for 30 days  Qty: 30 tablet, Refills: 0    Associated Diagnoses: Primary adenocarcinoma of lung, unspecified laterality (H)         CONTINUE these medications which have CHANGED    Details   acetylcysteine (MUCOMYST) 10 % nebulizer solution Inhale 4 mLs into the lungs 4 times daily  Qty: 480 mL, Refills: 0    Comments: Please dispense with syringes and instructions for use  Associated Diagnoses: Acute and chronic respiratory failure with hypoxia (H)         CONTINUE these medications which have NOT CHANGED    Details   albuterol (PROAIR HFA/PROVENTIL HFA/VENTOLIN HFA) 108 (90 Base) MCG/ACT inhaler INHALE 1 OR 2 PUFFS BY MOUTH EVERY 4 HOURS AS NEEDED  Qty: 18 g, Refills: 3    Comments: Pharmacy may dispense brand covered by insurance (Proair, or proventil or ventolin or generic albuterol inhaler)  Associated Diagnoses: Chronic obstructive pulmonary disease, unspecified COPD type (H)      albuterol (PROVENTIL) (2.5 MG/3ML) 0.083% neb solution Take 1 vial (2.5 mg) by nebulization 4 times daily for 30 days  Qty: 360 mL, Refills: 0    Associated Diagnoses: COPD exacerbation (H)      alcohol swab prep pads Use to  swab area of injection/xander as directed.  Qty: 100 each, Refills: 1    Associated Diagnoses: Type 2 diabetes mellitus without complication, without long-term current use of insulin (H)      amLODIPine (NORVASC) 10 MG tablet Take 1 tablet (10 mg) by mouth daily  Qty: 90 tablet, Refills: 3    Associated Diagnoses: Essential hypertension      aspirin 81 MG EC tablet Take 1 tablet (81 mg) by mouth daily  Qty: 90 tablet, Refills: 3    Associated Diagnoses: Essential hypertension; Type 2 diabetes mellitus without complication, without long-term current use of insulin (H)      cetirizine (ZYRTEC) 10 MG tablet Take 1 tablet (10 mg) by mouth daily  Qty: 30 tablet, Refills: 10    Associated Diagnoses: Seasonal allergic rhinitis, unspecified trigger      Continuous Blood Gluc  (FREESTYLE GIOVANNI 2 READER) JOSEPHINE 1 each daily For continuous glucose monitoring  Qty: 1 each, Refills: 0    Associated Diagnoses: Type 2 diabetes mellitus with hyperglycemia, without long-term current use of insulin (H)      !! Continuous Blood Gluc Sensor (FREESTYLE GIOVANNI 14 DAY SENSOR) MISC Change every 14 days.  Qty: 2 each, Refills: 11    Associated Diagnoses: Type 2 diabetes mellitus without complication, without long-term current use of insulin (H)      !! Continuous Blood Gluc Sensor (FREESTYLE GIOVANNI 2 SENSOR) MISC 1 each every 14 days For use with Freestyle Giovanni 2  for continuous monitioring of blood glucose levels. Replace sensor every 14 days.  Qty: 2 each, Refills: 11    Associated Diagnoses: Type 2 diabetes mellitus with hyperglycemia, without long-term current use of insulin (H)      Easy Comfort Lancets MISC       empagliflozin (JARDIANCE) 25 MG TABS tablet Take 1 tablet (25 mg) by mouth daily  Qty: 30 tablet, Refills: 3    Comments: Dose change  Associated Diagnoses: Type 2 diabetes mellitus with hyperglycemia, without long-term current use of insulin (H)      fluticasone (FLONASE) 50 MCG/ACT nasal spray Spray 1 spray into  both nostrils daily Use at night before bed  Qty: 15.8 mL, Refills: 7    Associated Diagnoses: Seasonal allergic rhinitis, unspecified trigger      Fluticasone-Umeclidin-Vilanterol (TRELEGY ELLIPTA) 200-62.5-25 MCG/INH oral inhaler Inhale 1 puff into the lungs daily  Qty: 28 each, Refills: 5    Associated Diagnoses: Chronic obstructive pulmonary disease, unspecified COPD type (H)      glipiZIDE (GLUCOTROL) 5 MG tablet Take 1 tablet (5 mg) by mouth 2 times daily (before meals)  Qty: 180 tablet, Refills: 3    Associated Diagnoses: Type 2 diabetes mellitus without complication, without long-term current use of insulin (H)      glucosamine-chondroitin 500-400 MG tablet Take 1 tablet by mouth daily  Qty: 90 tablet, Refills: 3    Associated Diagnoses: Type 2 diabetes mellitus without complication, without long-term current use of insulin (H)      ipratropium - albuterol 0.5 mg/2.5 mg/3 mL (DUONEB) 0.5-2.5 (3) MG/3ML neb solution Take 1 vial by nebulization every 6 hours as needed for shortness of breath, wheezing or cough      ketotifen (ZADITOR) 0.025 % ophthalmic solution Place 1 drop into both eyes daily      Lancet Devices (EASY MINI EJECT LANCING DEVICE) MISC       multivitamin w/minerals (THERA-VIT-M) tablet Take 1 tablet by mouth daily  Qty: 30 tablet, Refills: 11    Comments: Patient had medications at multiple pharmacies and wants to switch over to mail order.      nicotine (NICODERM CQ) 14 MG/24HR 24 hr patch Place 1 patch onto the skin daily for 30 days  Qty: 30 patch, Refills: 0    Associated Diagnoses: Cigarette smoker      nicotine (NICORETTE) 4 MG lozenge Place 1 lozenge (4 mg) inside cheek every hour as needed for smoking cessation  Qty: 100 lozenge, Refills: 0    Associated Diagnoses: Cigarette smoker      nicotine (NICOTROL) 10 MG inhaler Use 1 cartridge as needed for urge to smoke by puffing over course of 20min.  Use 6-16 cart/day; reduce number of cart/day over 6-12 weeks.  Qty: 160 each, Refills: 0     Associated Diagnoses: Cigarette smoker      omega-3 acid ethyl esters (LOVAZA) 1 g capsule Take 2 capsules (2 g) by mouth 2 times daily Please call [phone] to schedule follow up visit for more refills.  Qty: 360 capsule, Refills: 0    Associated Diagnoses: Type 2 diabetes mellitus without complication, without long-term current use of insulin (H)      omeprazole (PRILOSEC) 20 MG DR capsule Take 1 capsule (20 mg) by mouth 2 times daily  Qty: 180 capsule, Refills: 3    Associated Diagnoses: Gastroesophageal reflux disease, unspecified whether esophagitis present      polyethylene glycol-propylene glycol (SYSTANE) 0.4-0.3 % SOLN ophthalmic solution Place 1 drop into both eyes 4 times daily  Qty: 5 mL, Refills: 11    Comments: Patient had medications at multiple pharmacies and wants to switch over to mail order.  Associated Diagnoses: Dry eye syndrome of both eyes      pregabalin (LYRICA) 150 MG capsule Take 1 capsule (150 mg) by mouth 2 times daily  Qty: 60 capsule, Refills: 3    Comments: Dose change  Associated Diagnoses: Type 2 diabetes mellitus with hyperglycemia, without long-term current use of insulin (H); Diabetic peripheral neuropathy (H)      STATIN NOT PRESCRIBED (INTENTIONAL) Please choose reason not prescribed from choices below.    Associated Diagnoses: Type 2 diabetes mellitus without complication, without long-term current use of insulin (H)       !! - Potential duplicate medications found. Please discuss with provider.      STOP taking these medications       doxycycline hyclate (VIBRA-TABS) 100 MG tablet Comments:   Reason for Stopping:             Allergies   Allergies   Allergen Reactions     Interferons Dermatitis     Penicillins Hives     Aspirin      325mg      Colon Care

## 2023-02-24 NOTE — PROGRESS NOTES
Care Management Follow Up    Length of Stay (days): 4    Expected Discharge Date: 02/24/2023     Concerns to be Addressed: all concerns addressed in this encounter, no discharge needs identified     Patient plan of care discussed at interdisciplinary rounds: Yes    Anticipated Discharge Disposition: Home, Home Care     Anticipated Discharge Services: PCA  Anticipated Discharge DME:  Trilogy NIV    Patient/family educated on Medicare website which has current facility and service quality ratings: Yes  Education Provided on the Discharge Plan: yes   Patient/Family in Agreement with the Plan: yes     Referrals Placed by CM/SW: External Care Coordination  Private pay costs discussed: Not applicable    Additional Information:  Received update from provider team that pt is medically ready for discharge to home today.  Called [facility] to deliver a portable oxygen tank for transport to home, they will deliver around 11am.  Spoke with Rosibel ANNE at [facility] and they will meet the pt at her home at 3pm to deliver and set up her new trilogy NIV.  Updated [facility] of pt's discharge plans and faxed discharge orders.  Camelia DUARTE, arranged pt a ride to home(see note).  RNCC will remain available if further needs arise.          [facility](RN/PT/OT)  Phone: [phone]  Fax: [phone]  Perlita Orellana Ph: [phone], Fax: [phone]     [facility](home oxygen-3-4L, triology NIV)  Phone: [phone]    [facility]  Phone: [phone]    [facility]  Phone: [phone]  Pager: [phone]    SEARCHABLE in AMCOM - search CARE COORDINATOR     [place] [facility] [place] ([id number]) Saturday & Sunday; ([phone]) [facility] Holidays     Units: 4A, 4C, 4E, 5A & 5B   Pager: [phone]    Units: 6A & 6B    Pager: [phone]    Units: 6C & 6D   Pager: [phone]    Units: 7A, 7B, 7C, 7D & 5C    Pager: [phone]    Units: [facility] ED, 5 Ortho, 5 Med/Surg, 6 Med/Surg,  8A, 10 ICU, & [facility]    Pager: [phone]

## 2023-02-25 ENCOUNTER — MEDICAL CORRESPONDENCE (OUTPATIENT)
Dept: HEALTH INFORMATION MANAGEMENT | Facility: CLINIC | Age: 68
End: 2023-02-25

## 2023-02-25 LAB
A1AT PHENOTYP SERPL-IMP: NORMAL
A1AT SERPL-MCNC: 149 MG/DL

## 2023-02-26 ENCOUNTER — DOCUMENTATION ONLY (OUTPATIENT)
Facility: CLINIC | Age: 68
End: 2023-02-26
Payer: COMMERCIAL

## 2023-02-26 NOTE — PROGRESS NOTES
TREATMENT PLAN AND GOALS:  PATIENT INSTRUCTED ON USE AND CARE OF THE PAP EQUIPMENT IN ACCORDANCE WITH THE [facility] PRACTICE GUIDELINES.    HOSPITAL TO HOME SET UP:  2/24/2023    NIV DEVICE: TRILOGY  NIV MODE: AVAPS - AE  NIV SETTINGS: , EPAP 5-12, PS 4-30, RR 14  COMFORT SETTINGS: MAX PRESSURE 40, IT 1.5 (0.3-3.0)     VENT CHECK: PIP 9.0, , RR 33, MINVENT 13.3, MAP 5.5, I:E 1:1.8, LEAK 30  ALARMS: OFF  CIRCUIT: HEATED  HUMIDITY: HEATED (PATIENT MAY JUST WANT PASSOVER HUMDITY)  SECRETIONS: NO COUGH OR SECRETIONS  LOC: ALERT AND ORIENTED    On license of [facility] FOLLOW UP PLANNED FOR? WILL CALL MONDAY 2/27/2023  FURTHER FOLLOW UP WITH MD NEEDED? YES  DOES THE PATIENT HAS AN APPT SCHEDULED TO ESTABLISH CARE WITH AN NIV MANAGEMENT PROVIDER? TBD    PATIENT WAS OFFERED CHOICE OF VENDOR AND CHOSE On license of [facility].  PATIENT TO FOLLOW MEDICARE LCD STANDARDS FOR USE REQUIREMENTS AND INSTRUCTED TO FOLLOW UP WITH THEIR PHYSICIAN WITHIN THESE GUIDELINES.  PATIENT WAS PROVIDED CARE SHEET AND INSTRUCTED TO CALL ONE OF OUR LOCATIONS WITH QUESTIONS AND CONCERNS.  PATIENT WILL BE FOLLOWED UP WITH ACCORDING On license of [facility] PROTOCOL.  I HAVE VERIFIED THE DISPENSED MACHINE IS EITHER IN ORIGINAL PACKAGING FROM THE  OR HAS BEEN CHECKED AND FUNCTIONAL BASED ON THE CERTIFICATION STICKER ON THE OUTER PACKAGING.  THEREFORE, THE EQUIPMENT IS SAFE FOR DISPENSING TO THIS PATIENT.    BRAEDEN RAMIREZ  [phone]

## 2023-02-27 ENCOUNTER — TELEPHONE (OUTPATIENT)
Dept: INTERNAL MEDICINE | Facility: CLINIC | Age: 68
End: 2023-02-27
Payer: COMMERCIAL

## 2023-02-28 ENCOUNTER — DOCUMENTATION ONLY (OUTPATIENT)
Dept: INTERNAL MEDICINE | Facility: CLINIC | Age: 68
End: 2023-02-28
Payer: COMMERCIAL

## 2023-02-28 NOTE — PROGRESS NOTES
Type of Form Received: order    Form Received (Date) 2/28/23   Form Filled out Yes 3/3/23   Placed in provider folder Yes

## 2023-03-01 ENCOUNTER — TELEPHONE (OUTPATIENT)
Dept: ENDOCRINOLOGY | Facility: CLINIC | Age: 68
End: 2023-03-01

## 2023-03-01 ENCOUNTER — ANCILLARY PROCEDURE (OUTPATIENT)
Dept: CT IMAGING | Facility: CLINIC | Age: 68
End: 2023-03-01
Attending: INTERNAL MEDICINE
Payer: COMMERCIAL

## 2023-03-01 ENCOUNTER — ONCOLOGY VISIT (OUTPATIENT)
Dept: ONCOLOGY | Facility: CLINIC | Age: 68
End: 2023-03-01
Attending: INTERNAL MEDICINE
Payer: COMMERCIAL

## 2023-03-01 VITALS
OXYGEN SATURATION: 85 % | TEMPERATURE: 99.4 F | BODY MASS INDEX: 33.04 KG/M2 | SYSTOLIC BLOOD PRESSURE: 134 MMHG | DIASTOLIC BLOOD PRESSURE: 69 MMHG | RESPIRATION RATE: 20 BRPM | WEIGHT: 204.7 LBS | HEART RATE: 107 BPM

## 2023-03-01 DIAGNOSIS — E11.42 TYPE 2 DIABETES MELLITUS WITH DIABETIC POLYNEUROPATHY, WITHOUT LONG-TERM CURRENT USE OF INSULIN (H): ICD-10-CM

## 2023-03-01 DIAGNOSIS — C34.31 MALIGNANT NEOPLASM OF LOWER LOBE OF RIGHT LUNG (H): Primary | ICD-10-CM

## 2023-03-01 DIAGNOSIS — E11.9 TYPE 2 DIABETES MELLITUS WITHOUT COMPLICATION, WITHOUT LONG-TERM CURRENT USE OF INSULIN (H): ICD-10-CM

## 2023-03-01 PROCEDURE — 71250 CT THORAX DX C-: CPT | Mod: GC | Performed by: RADIOLOGY

## 2023-03-01 PROCEDURE — 99215 OFFICE O/P EST HI 40 MIN: CPT | Performed by: INTERNAL MEDICINE

## 2023-03-01 PROCEDURE — G0463 HOSPITAL OUTPT CLINIC VISIT: HCPCS | Performed by: INTERNAL MEDICINE

## 2023-03-01 RX ORDER — PROPYLENE GLYCOL 0.06 MG/ML
EMULSION OPHTHALMIC
COMMUNITY
Start: 2023-02-25 | End: 2023-05-04

## 2023-03-01 ASSESSMENT — PAIN SCALES - GENERAL: PAINLEVEL: WORST PAIN (10)

## 2023-03-01 NOTE — NURSING NOTE
"Oncology Rooming Note    March 1, 2023 4:06 PM   Jenn Aparicio is a 67 year old female who presents for:    Chief Complaint   Patient presents with     Oncology Clinic Visit     Lung ca     Initial Vitals: /69   Pulse 107   Temp 99.4  F (37.4  C) (Oral)   Resp 20   Wt 92.9 kg (204 lb 11.2 oz)   SpO2 (!) 85%   BMI 33.04 kg/m   Estimated body mass index is 33.04 kg/m  as calculated from the following:    Height as of 2/4/23: 1.676 m (5' 6\").    Weight as of this encounter: 92.9 kg (204 lb 11.2 oz). Body surface area is 2.08 meters squared.  Worst Pain (10) Comment: Data Unavailable   No LMP recorded. Patient is postmenopausal.  Allergies reviewed: Yes  Medications reviewed: Yes    Medications: Medication refills not needed today.  Pharmacy name entered into EPIC:    [facility] - [place], [place] - [address] [facility], [address] [facility] - [place], [place] - [address] [place] [address]  [facility] [place] - [address] [place] [address] [facility] - [place], [place] - [address] [id number] - [place], [place] - [address] AT [facility] OF [address]    Clinical concerns: Pt is concern about having a fever, hands are turning red      Mary Ramirez            "

## 2023-03-01 NOTE — TELEPHONE ENCOUNTER
----- Message from Zarina Leung MD sent at 3/1/2023  4:49 PM CST -----  Hi Dr. Shabazz     Jenn was hospitalized twice in Feb for COPD exacerbation and is on a long prednisone taper. She showed me her meter today and they're all pretty consistently in the upper 200-300 range. There was a couple of readings in the 80s in the morning. Pred tapered to 30 mg today and she'll be off in a couple of weeks.    Can your team give her a call or suggest the changes that she could make to keep this under wraps? Thanks so mucH!

## 2023-03-01 NOTE — PROGRESS NOTES
[facility]    PATIENT NAME: Jenn Aparicio  MRN # [id number]   DATE OF VISIT: March 1, 2023  YOB: 1955     CANCER TYPE: Lung adenocarcinoma  STAGE: IA2 [id number] poorly diff adeno RLL, then [id number] IA2 suspected NSCLC RUL, medically inoperable     ECOG PS: 1    PD-L1: N/A  Lung panel: N/A  NGS: N/A    SUMMARY  12/24/15 PET/CT  1/13/15 CT lung bx. Adenocarcinoma  2/8/16 R thoracotomy, RLL lobectomy (Dr. Cunha). Adenocarcinoma, 1.1 cm, assoicated with atypical adenomatous hyperplasia  10/18/19 CTA for shortness of breath. New 1.3 cm RUL nodule  11/2019 PET/CT. 1.1 cm RUL hypermetabolic nodule (SUV 12.6)  12/26/19 Brain MRI negative for mets  1/14~1/28/20 SBRT to RUL nodule, 5000 cGy, every other day, 1000 cGy (Dr. Currie at [facility]). No bx due to O2 dependence and too much risk  8/19/20 First visit with me   11/29/21 CT chest. New 10 mm RUL nodule  1/11/22 CT chest. New 7.2 mm RUL nodule, unchanged RUL nodules  3/31/22 CT chest. New RUL nodule from Jan 2022 7-->10 mm, new 5 mm ROBERT nodule  5/20~5/24/22 [facility] for COPD exacerbation.   6/24/22 CT chest non contrast.   8/16/22 EGD. 3 cm hiatal hernia, o/w normal  8/31/22 CT chest. 2.5 x 1.3 cm R midlung subpleural nodularity (4:98) previously 2.3 x 1.1 cm, slightly increased solid component   10/5/22 PET/CT. 2 x 1.3 cm irregular opacity posterior RUL (SUV 2.46), 2.4 x 0.8 cm linear opacity (SUV 4.76); there was bronchiectasia on prior imaging. Stable 1.1 x 0.8 cm non FDG avid RUL opacity and unchanged 4 mm posterior RUL nodule. No adenopathy. Inferior right breast uptake (SUV 6.09) likely infectious or inflammatory.   2/3~2/9/23 [facility] for COPD exacerbation  2/4/23 CTA during hospitalization. No significant change in 2.3 x 1.5 cm spiculated nodule in the right upper lobe (series 7 image 94) with surrounding linear parenchymal opacities and subtle nodularity  2/20~2/24/23 [facility] for COPD exacerbation.     ASSESSMENT AND PLAN  NSCLC, adeno, RUL: Has had two  CTAs in Jan - Feb as part of the COPD exacerbation evaluation that showed the post-radiation changes were a little more solid appearing. I carefully reviewed multiple serial scans since the PET/CT last Oct, which we did because I was more worried about the R lung changes as well. To review, there was a posterior post radiation change that was not FDG avid on the PET/CT in Oct 2022 (SUV 2.46; see image below). There was a second peripheral enlongated area on CT that was a little more FDG avid (SUV 4.76) but then improved since (see images below). Over the last couple of months, the post-radiation area has gotten more solid appearing. We talked about the differential; she's had a couple of really significant COPD exacerbations with increased mucus production. One possibility is that there was a mucus plug that caused the consolidation. The other possibility is a local recurrence. We can't easily differentiate between the two - a bx is dangerous with her PFTs and lung function. We could consider an endobronchial approach if we needed to, however the risk of that is not insignificant either given her chronic hypoxia and recent COPD exacerbations. We also talked about what the next step would be if it was indeed confirmed to be cancer. We wouldn't be able to re-treat that area with SBRT and she's not a surgical candidate. I would not advocate starting systemic therapy for an incurable disease with that small of a burden of disease, recent COPD exacerbations/other comorbidities, etc., in balancing QOL, prolonging survival and the potential toxicities of treatment. We had an in depth conversation about these concepts. She's ok re-evaluating this area with another CT chest as it's already been a month since the last one. We'll go from there.     DM2:Seeing Dr. Shabazz for the DM. Jenn showed me her glucose readings from the Marti sensor. Almost all of them were over 200, with quite a few over 300 and a couple in the 80s,  mostly in the morning. Some were too high to detect. She's on a prednisone taper and tapered to 30 mg today. No changes in her diabetes management was made on discharge from the hospital. She was on sliding scale insulin inpt. I messaged Dr. Shabazz to see if her team could reach out to Jenn to make modifications as needed during the remainder of the pred taper.     Peripheral neuropathy: Still pretty bad, mostly driven by relatively poorly controlled DM until more recently, now exacerbated again by prednisone tapers.     40 minutes spent on the date of the encounter doing chart review, history and exam, documentation and further activities per the note     Zarina Leung MD  Associate Professor of Medicine  Hematology, Oncology and Transplantation      SUBJECTIVE  Jenn returns for close interim follow up of R lung nodules. Was recently hospitalized again for COPD/asthma overlap.   Denied smoking history - evaluation for COPD in non smoker ongoing - IgE, alpha-1-antitrypsin  On slower pred taper over 3 weeks since discharge 2/22 - should be at 30 mg this week     Prior note    Neuropathy is even worse. Burning. Making it more difficult to walk.   Eye exam in a few months. Vision is worse.   Finally moved. Still unpacking. No other interim changes     PAST MEDICAL HISTORY  Lung adenocarcinoma  DM2 with neuropathy  HCV IA, undetectable in 2019, treated  COPD. O2 dependent since late 2018. PFT 11/26/19. FEV1 0.64 (30%), FVC 1.69 (63%), DLCOunc 9.8 (38%).  HTN  H/o ITP  H/o disk herniation  Ankle surgery  Median neuropathy R  H/o sessile colon polyps 2014, h/o adenomas before that. Colonoscopy 2/7/18 @ [facility]. Sessile serated adenoma x 1, tubular adenoma x 3  H/o chronic LBP  Dyslipidemia  Cataracts    CURRENT OUTPATIENT MEDICATIONS  Current Outpatient Medications   Medication Sig Dispense Refill     acetylcysteine (MUCOMYST) 10 % nebulizer solution Inhale 4 mLs into the lungs 4 times daily 480 mL 0     albuterol  (PROAIR HFA/PROVENTIL HFA/VENTOLIN HFA) 108 (90 Base) MCG/ACT inhaler INHALE 1 OR 2 PUFFS BY MOUTH EVERY 4 HOURS AS NEEDED 18 g 3     albuterol (PROVENTIL) (2.5 MG/3ML) 0.083% neb solution Take 1 vial (2.5 mg) by nebulization 4 times daily 360 mL 0     amLODIPine (NORVASC) 10 MG tablet Take 1 tablet (10 mg) by mouth daily 90 tablet 3     aspirin 81 MG EC tablet Take 1 tablet (81 mg) by mouth daily 90 tablet 3     azithromycin (ZITHROMAX) 250 MG tablet Take 1 tablet (250 mg) by mouth daily for 30 days 30 tablet 0     buPROPion (WELLBUTRIN XL) 150 MG 24 hr tablet Take 1 tablet (150 mg) by mouth every morning 30 tablet 0     cetirizine (ZYRTEC) 10 MG tablet Take 1 tablet (10 mg) by mouth daily 30 tablet 10     Easy Comfort Lancets MISC        empagliflozin (JARDIANCE) 25 MG TABS tablet Take 1 tablet (25 mg) by mouth daily 30 tablet 3     fluticasone (FLONASE) 50 MCG/ACT nasal spray Spray 1 spray into both nostrils daily Use at night before bed 15.8 mL 7     Fluticasone-Umeclidin-Vilanterol (TRELEGY ELLIPTA) 200-62.5-25 MCG/INH oral inhaler Inhale 1 puff into the lungs daily 28 each 5     glucosamine-chondroitin 500-400 MG tablet Take 1 tablet by mouth daily 90 tablet 3     ipratropium - albuterol 0.5 mg/2.5 mg/3 mL (DUONEB) 0.5-2.5 (3) MG/3ML neb solution Take 1 vial by nebulization every 6 hours as needed for shortness of breath, wheezing or cough       ketotifen (ZADITOR) 0.025 % ophthalmic solution Place 1 drop into both eyes daily       multivitamin w/minerals (THERA-VIT-M) tablet Take 1 tablet by mouth daily 30 tablet 11     nicotine (NICODERM CQ) 14 MG/24HR 24 hr patch Place 1 patch onto the skin daily for 30 days 30 patch 0     nicotine (NICORETTE) 4 MG lozenge Place 1 lozenge (4 mg) inside cheek every hour as needed for smoking cessation 100 lozenge 0     nicotine (NICOTROL) 10 MG inhaler Use 1 cartridge as needed for urge to smoke by puffing over course of 20min.  Use 6-16 cart/day; reduce number of cart/day  over 6-12 weeks. 160 each 0     omega-3 acid ethyl esters (LOVAZA) 1 g capsule Take 2 capsules (2 g) by mouth 2 times daily Please call [phone] to schedule follow up visit for more refills. 360 capsule 0     omeprazole (PRILOSEC) 20 MG DR capsule Take 1 capsule (20 mg) by mouth 2 times daily 180 capsule 3     predniSONE (DELTASONE) 20 MG tablet Take 0.5 tablets (10 mg) by mouth daily 32 tablet 0     pregabalin (LYRICA) 150 MG capsule Take 1 capsule (150 mg) by mouth 2 times daily 60 capsule 3     propylene glycol (SYSTANE BALANCE) 0.6 % SOLN ophthalmic solution        senna-docusate (SENOKOT-S/PERICOLACE) 8.6-50 MG tablet Take 1 tablet by mouth At Bedtime for 30 days 30 tablet 0     alcohol swab prep pads Use to swab area of injection/xander as directed. (Patient not taking: Reported on 3/1/2023) 100 each 1     Continuous Blood Gluc  (FREESTYLE GIOVANNI 2 READER) JOSEPHINE 1 each daily For continuous glucose monitoring (Patient not taking: Reported on 3/1/2023) 1 each 0     Continuous Blood Gluc Sensor (FREESTYLE GIOVANNI 14 DAY SENSOR) MISC Change every 14 days. (Patient not taking: Reported on 3/1/2023) 2 each 11     Continuous Blood Gluc Sensor (FREESTYLE GIOVANNI 2 SENSOR) MISC 1 each every 14 days For use with Freestyle Giovanni 2  for continuous monitioring of blood glucose levels. Replace sensor every 14 days. (Patient not taking: Reported on 3/1/2023) 2 each 11     glipiZIDE (GLUCOTROL) 5 MG tablet Take 2 tablets (10mg) in the AM and 1 tablet (5mg) at PM daily. 90 tablet 0     Lancet Devices (EASY MINI EJECT LANCING DEVICE) MISC  (Patient not taking: Reported on 3/1/2023)       polyethylene glycol-propylene glycol (SYSTANE) 0.4-0.3 % SOLN ophthalmic solution Place 1 drop into both eyes 4 times daily (Patient not taking: Reported on 3/1/2023) 5 mL 11     STATIN NOT PRESCRIBED (INTENTIONAL) Please choose reason not prescribed from choices below. (Patient not taking: Reported on 3/1/2023)        ALLERGIES  Allergies   Allergen Reactions     Interferons Dermatitis     Penicillins Hives     Aspirin      325mg      Colon Care       REVIEW OF SYSTEMS  As above in the HPI, o/w complete 12-point ROS was negative.    PHYSICAL EXAM  /69   Pulse 107   Temp 99.4  F (37.4  C) (Oral)   Resp 20   Wt 92.9 kg (204 lb 11.2 oz)   SpO2 (!) 85%   BMI 33.04 kg/m     O2 improved on O2. Not visibly dyspneic at rest or with ambulation to the lobby  GEN: NAD  HEENT: EOMI, no icterus, injection or pallor  EXT: no edema  NEURO: alert    LABORATORY AND IMAGING STUDIES   02/24/23 06:26 02/24/23 07:16 02/24/23 11:12   Sodium 143     Potassium 3.7     Chloride 103     Carbon Dioxide (CO2) 28     Urea Nitrogen 14.5     Creatinine 0.59     GFR Estimate >90     Calcium 9.2     Anion Gap 12     Glucose 134 (H)     GLUCOSE BY METER POCT  131 (H) 252 (H)   WBC 8.5     Hemoglobin 13.0     Hematocrit 43.4     Platelet Count 180     RBC Count 4.73     MCV 92     MCH 27.5     MCHC 30.0 (L)     RDW 14.3     % Neutrophils 50     % Lymphocytes 40     % Monocytes 7     % Eosinophils 3     % Basophils 0     Absolute Basophils 0.0     Absolute Eosinophils 0.2     Absolute Immature Granulocytes 0.0     Absolute Lymphocytes 3.4     Absolute Monocytes 0.6     % Immature Granulocytes 0     Absolute Neutrophils 4.2     Absolute NRBCs 0.0     NRBCs per 100 WBC 0       Labs were independently reviewed and interpreted by me    CTA 1/1/23, CTA 2/3/23 and CT chest done earlier today 3/1/23 were personally reviewed. I also did extensive comparisons to older scans, including the PET/CT from 10/5/22 and the PET from 11/2019, then CT chest 9/2020 as shown below.     Imaging was personally reviewed and interpreted by me    CTA 2/4/23 - this is in the same area as the prior RUL nodule that was treated with SBRT        PET 11/2019 prior to treatment       CT 9/8/2020. SBRT was done Jan 2020        This is the more FDG avid area on the Oct 2022 PET/CT  and how it's evolved over time          3/1/23

## 2023-03-01 NOTE — LETTER
3/1/2023         RE: Jenn Aparicio  [address]  [place] [address]        Dear Colleague,    Thank you for referring your patient, Jenn Aparicio, to the [facility]. Please see a copy of my visit note below.       [facility]    PATIENT NAME: Jenn Aparicio  MRN # [id number]   DATE OF VISIT: March 1, 2023  YOB: 1955     CANCER TYPE: Lung adenocarcinoma  STAGE: IA2 [id number] poorly diff adeno RLL, then [id number] IA2 suspected NSCLC RUL, medically inoperable     ECOG PS: 1    PD-L1: N/A  Lung panel: N/A  NGS: N/A    SUMMARY  12/24/15 PET/CT  1/13/15 CT lung bx. Adenocarcinoma  2/8/16 R thoracotomy, RLL lobectomy (Dr. Cunha). Adenocarcinoma, 1.1 cm, assoicated with atypical adenomatous hyperplasia  10/18/19 CTA for shortness of breath. New 1.3 cm RUL nodule  11/2019 PET/CT. 1.1 cm RUL hypermetabolic nodule (SUV 12.6)  12/26/19 Brain MRI negative for mets  1/14~1/28/20 SBRT to RUL nodule, 5000 cGy, every other day, 1000 cGy (Dr. Currie at [facility] [place] No bx due to O2 dependence and too much risk  8/19/20 First visit with me   11/29/21 CT chest. New 10 mm RUL nodule  1/11/22 CT chest. New 7.2 mm RUL nodule, unchanged RUL nodules  3/31/22 CT chest. New RUL nodule from Jan 2022 7-->10 mm, new 5 mm ROBERT nodule  5/20~5/24/22 [facility] for COPD exacerbation.   6/24/22 CT chest non contrast.   8/16/22 EGD. 3 cm hiatal hernia, o/w normal  8/31/22 CT chest. 2.5 x 1.3 cm R midlung subpleural nodularity (4:98) previously 2.3 x 1.1 cm, slightly increased solid component   10/5/22 PET/CT. 2 x 1.3 cm irregular opacity posterior RUL (SUV 2.46), 2.4 x 0.8 cm linear opacity (SUV 4.76); there was bronchiectasia on prior imaging. Stable 1.1 x 0.8 cm non FDG avid RUL opacity and unchanged 4 mm posterior RUL nodule. No adenopathy. Inferior right breast uptake (SUV 6.09) likely infectious or inflammatory.   2/3~2/9/23 [facility] for COPD exacerbation  2/4/23 CTA during hospitalization. No  significant change in 2.3 x 1.5 cm spiculated nodule in the right upper lobe (series 7 image 94) with surrounding linear parenchymal opacities and subtle nodularity  2/20~2/24/23 [facility] for COPD exacerbation.     ASSESSMENT AND PLAN  NSCLC, adeno, RUL: Has had two CTAs in Jan - Feb as part of the COPD exacerbation evaluation that showed the post-radiation changes were a little more solid appearing. I carefully reviewed multiple serial scans since the PET/CT last Oct, which we did because I was more worried about the R lung changes as well. To review, there was a posterior post radiation change that was not FDG avid on the PET/CT in Oct 2022 (SUV 2.46; see image below). There was a second peripheral enlongated area on CT that was a little more FDG avid (SUV 4.76) but then improved since (see images below). Over the last couple of months, the post-radiation area has gotten more solid appearing. We talked about the differential; she's had a couple of really significant COPD exacerbations with increased mucus production. One possibility is that there was a mucus plug that caused the consolidation. The other possibility is a local recurrence. We can't easily differentiate between the two - a bx is dangerous with her PFTs and lung function. We could consider an endobronchial approach if we needed to, however the risk of that is not insignificant either given her chronic hypoxia and recent COPD exacerbations. We also talked about what the next step would be if it was indeed confirmed to be cancer. We wouldn't be able to re-treat that area with SBRT and she's not a surgical candidate. I would not advocate starting systemic therapy for an incurable disease with that small of a burden of disease, recent COPD exacerbations/other comorbidities, etc., in balancing QOL, prolonging survival and the potential toxicities of treatment. We had an in depth conversation about these concepts. She's ok re-evaluating this area with another  CT chest as it's already been a month since the last one. We'll go from there.     DM2:Seeing Dr. Shabazz for the DM. Jenn showed me her glucose readings from the Marti sensor. Almost all of them were over 200, with quite a few over 300 and a couple in the 80s, mostly in the morning. Some were too high to detect. She's on a prednisone taper and tapered to 30 mg today. No changes in her diabetes management was made on discharge from the hospital. She was on sliding scale insulin inpt. I messaged Dr. Shabazz to see if her team could reach out to Jenn to make modifications as needed during the remainder of the pred taper.     Peripheral neuropathy: Still pretty bad, mostly driven by relatively poorly controlled DM until more recently, now exacerbated again by prednisone tapers.     40 minutes spent on the date of the encounter doing chart review, history and exam, documentation and further activities per the note     Zarina Leung MD  Associate Professor of Medicine  Hematology, Oncology and Transplantation      SUBJECTIVE  Jenn returns for close interim follow up of R lung nodules. Was recently hospitalized again for COPD/asthma overlap.   Denied smoking history - evaluation for COPD in non smoker ongoing - IgE, alpha-1-antitrypsin  On slower pred taper over 3 weeks since discharge 2/22 - should be at 30 mg this week     Prior note    Neuropathy is even worse. Burning. Making it more difficult to walk.   Eye exam in a few months. Vision is worse.   Finally moved. Still unpacking. No other interim changes     PAST MEDICAL HISTORY  Lung adenocarcinoma  DM2 with neuropathy  HCV IA, undetectable in 2019, treated  COPD. O2 dependent since late 2018. PFT 11/26/19. FEV1 0.64 (30%), FVC 1.69 (63%), DLCOunc 9.8 (38%).  HTN  H/o ITP  H/o disk herniation  Ankle surgery  Median neuropathy R  H/o sessile colon polyps 2014, h/o adenomas before that. [place] [facility] Sessile serated adenoma x 1, tubular adenoma x  3  H/o chronic LBP  Dyslipidemia  Cataracts    CURRENT OUTPATIENT MEDICATIONS  Current Outpatient Medications   Medication Sig Dispense Refill     acetylcysteine (MUCOMYST) 10 % nebulizer solution Inhale 4 mLs into the lungs 4 times daily 480 mL 0     albuterol (PROAIR HFA/PROVENTIL HFA/VENTOLIN HFA) 108 (90 Base) MCG/ACT inhaler INHALE 1 OR 2 PUFFS BY MOUTH EVERY 4 HOURS AS NEEDED 18 g 3     albuterol (PROVENTIL) (2.5 MG/3ML) 0.083% neb solution Take 1 vial (2.5 mg) by nebulization 4 times daily 360 mL 0     amLODIPine (NORVASC) 10 MG tablet Take 1 tablet (10 mg) by mouth daily 90 tablet 3     aspirin 81 MG EC tablet Take 1 tablet (81 mg) by mouth daily 90 tablet 3     azithromycin (ZITHROMAX) 250 MG tablet Take 1 tablet (250 mg) by mouth daily for 30 days 30 tablet 0     buPROPion (WELLBUTRIN XL) 150 MG 24 hr tablet Take 1 tablet (150 mg) by mouth every morning 30 tablet 0     cetirizine (ZYRTEC) 10 MG tablet Take 1 tablet (10 mg) by mouth daily 30 tablet 10     Easy Comfort Lancets MISC        empagliflozin (JARDIANCE) 25 MG TABS tablet Take 1 tablet (25 mg) by mouth daily 30 tablet 3     fluticasone (FLONASE) 50 MCG/ACT nasal spray Spray 1 spray into both nostrils daily Use at night before bed 15.8 mL 7     Fluticasone-Umeclidin-Vilanterol (TRELEGY ELLIPTA) 200-62.5-25 MCG/INH oral inhaler Inhale 1 puff into the lungs daily 28 each 5     glucosamine-chondroitin 500-400 MG tablet Take 1 tablet by mouth daily 90 tablet 3     ipratropium - albuterol 0.5 mg/2.5 mg/3 mL (DUONEB) 0.5-2.5 (3) MG/3ML neb solution Take 1 vial by nebulization every 6 hours as needed for shortness of breath, wheezing or cough       ketotifen (ZADITOR) 0.025 % ophthalmic solution Place 1 drop into both eyes daily       multivitamin w/minerals (THERA-VIT-M) tablet Take 1 tablet by mouth daily 30 tablet 11     nicotine (NICODERM CQ) 14 MG/24HR 24 hr patch Place 1 patch onto the skin daily for 30 days 30 patch 0     nicotine (NICORETTE) 4 MG  lozenge Place 1 lozenge (4 mg) inside cheek every hour as needed for smoking cessation 100 lozenge 0     nicotine (NICOTROL) 10 MG inhaler Use 1 cartridge as needed for urge to smoke by puffing over course of 20min.  Use 6-16 cart/day; reduce number of cart/day over 6-12 weeks. 160 each 0     omega-3 acid ethyl esters (LOVAZA) 1 g capsule Take 2 capsules (2 g) by mouth 2 times daily Please call [phone] to schedule follow up visit for more refills. 360 capsule 0     omeprazole (PRILOSEC) 20 MG DR capsule Take 1 capsule (20 mg) by mouth 2 times daily 180 capsule 3     predniSONE (DELTASONE) 20 MG tablet Take 0.5 tablets (10 mg) by mouth daily 32 tablet 0     pregabalin (LYRICA) 150 MG capsule Take 1 capsule (150 mg) by mouth 2 times daily 60 capsule 3     propylene glycol (SYSTANE BALANCE) 0.6 % SOLN ophthalmic solution        senna-docusate (SENOKOT-S/PERICOLACE) 8.6-50 MG tablet Take 1 tablet by mouth At Bedtime for 30 days 30 tablet 0     alcohol swab prep pads Use to swab area of injection/xander as directed. (Patient not taking: Reported on 3/1/2023) 100 each 1     Continuous Blood Gluc  (FREESTYLE GIOVANNI 2 READER) JOSEPHINE 1 each daily For continuous glucose monitoring (Patient not taking: Reported on 3/1/2023) 1 each 0     Continuous Blood Gluc Sensor (FREESTYLE GIOVANNI 14 DAY SENSOR) MISC Change every 14 days. (Patient not taking: Reported on 3/1/2023) 2 each 11     Continuous Blood Gluc Sensor (FREESTYLE GIOVANNI 2 SENSOR) MISC 1 each every 14 days For use with Freestyle Giovanni 2  for continuous monitioring of blood glucose levels. Replace sensor every 14 days. (Patient not taking: Reported on 3/1/2023) 2 each 11     glipiZIDE (GLUCOTROL) 5 MG tablet Take 2 tablets (10mg) in the AM and 1 tablet (5mg) at PM daily. 90 tablet 0     Lancet Devices (EASY MINI EJECT LANCING DEVICE) MISC  (Patient not taking: Reported on 3/1/2023)       polyethylene glycol-propylene glycol (SYSTANE) 0.4-0.3 % SOLN  ophthalmic solution Place 1 drop into both eyes 4 times daily (Patient not taking: Reported on 3/1/2023) 5 mL 11     STATIN NOT PRESCRIBED (INTENTIONAL) Please choose reason not prescribed from choices below. (Patient not taking: Reported on 3/1/2023)       ALLERGIES  Allergies   Allergen Reactions     Interferons Dermatitis     Penicillins Hives     Aspirin      325mg      Colon Care       REVIEW OF SYSTEMS  As above in the HPI, o/w complete 12-point ROS was negative.    PHYSICAL EXAM  /69   Pulse 107   Temp 99.4  F (37.4  C) (Oral)   Resp 20   Wt 92.9 kg (204 lb 11.2 oz)   SpO2 (!) 85%   BMI 33.04 kg/m     O2 improved on O2. Not visibly dyspneic at rest or with ambulation to the lobby  GEN: NAD  HEENT: EOMI, no icterus, injection or pallor  EXT: no edema  NEURO: alert    LABORATORY AND IMAGING STUDIES   02/24/23 06:26 02/24/23 07:16 02/24/23 11:12   Sodium 143     Potassium 3.7     Chloride 103     Carbon Dioxide (CO2) 28     Urea Nitrogen 14.5     Creatinine 0.59     GFR Estimate >90     Calcium 9.2     Anion Gap 12     Glucose 134 (H)     GLUCOSE BY METER POCT  131 (H) 252 (H)   WBC 8.5     Hemoglobin 13.0     Hematocrit 43.4     Platelet Count 180     RBC Count 4.73     MCV 92     MCH 27.5     MCHC 30.0 (L)     RDW 14.3     % Neutrophils 50     % Lymphocytes 40     % Monocytes 7     % Eosinophils 3     % Basophils 0     Absolute Basophils 0.0     Absolute Eosinophils 0.2     Absolute Immature Granulocytes 0.0     Absolute Lymphocytes 3.4     Absolute Monocytes 0.6     % Immature Granulocytes 0     Absolute Neutrophils 4.2     Absolute NRBCs 0.0     NRBCs per 100 WBC 0       Labs were independently reviewed and interpreted by me    CTA 1/1/23, CTA 2/3/23 and CT chest done earlier today 3/1/23 were personally reviewed. I also did extensive comparisons to older scans, including the PET/CT from 10/5/22 and the PET from 11/2019, then CT chest 9/2020 as shown below.     Imaging was personally reviewed and  interpreted by me    CTA 2/4/23 - this is in the same area as the prior RUL nodule that was treated with SBRT        PET 11/2019 prior to treatment       CT 9/8/2020. SBRT was done Jan 2020        This is the more FDG avid area on the Oct 2022 PET/CT and how it's evolved over time          3/1/23

## 2023-03-01 NOTE — TELEPHONE ENCOUNTER
Endocrine team, got the message below from patient's oncologist.  Would you please contact Ms. Sullivan:  -Could she give us her glucose values over the past 2 to 3 days; based on this data, I may adjust her glipizide dose upward (please send me this glucose data once you speak to her)  -I have an opening in the afternoon next week on 3/8/2023: Would you please schedule her for a visit.  I prefer in person but we can also make a virtual appointment work.  -She had an appointment in diabetes education with Nithya Narvaez tomorrow but it looks like she canceled and rescheduled for next week.  I will copy Nithya so that she is aware of these developments.

## 2023-03-02 ENCOUNTER — MEDICAL CORRESPONDENCE (OUTPATIENT)
Dept: HEALTH INFORMATION MANAGEMENT | Facility: CLINIC | Age: 68
End: 2023-03-02

## 2023-03-02 RX ORDER — GLIPIZIDE 5 MG/1
TABLET ORAL
Qty: 90 TABLET | Refills: 0 | Status: SHIPPED | OUTPATIENT
Start: 2023-03-02 | End: 2023-03-16

## 2023-03-02 NOTE — TELEPHONE ENCOUNTER
-For now, please ask Ms. Aparicio to increase glipizide to 10 mg in the morning and continue 5 mg in the evening temporarily.  -Continue Jardiance without changes.  -In addition to her visit in March, I would still like her to keep her appointment in April so that we have more opportunities for follow-up.  -Could she contact our clinic on Monday to update us on glucose data.  If she has more urgent questions or concerns, can reach out sooner.

## 2023-03-02 NOTE — TELEPHONE ENCOUNTER
Spoke to pt obtain numbers and scheduled for next week, pt will try to do in person if she is able to get a ride.:    3/2   612 am 118  138 am 106    3/1:  820pm-329  635pm- 331  239pm -298  152pm -287  957am-270   817 am-138   755am- 174  508 am- 87    2/28  932pm-232  831pm 242  806pm- 296  711pm- 239  613pm-241  556pm-217  230pm- 3321  109pm-378  11am-299  11am-300  955 am-249  905am-247  107- early am  102 early am

## 2023-03-03 ENCOUNTER — TELEPHONE (OUTPATIENT)
Dept: INTERNAL MEDICINE | Facility: CLINIC | Age: 68
End: 2023-03-03

## 2023-03-03 ENCOUNTER — OFFICE VISIT (OUTPATIENT)
Dept: URGENT CARE | Facility: URGENT CARE | Age: 68
End: 2023-03-03
Payer: COMMERCIAL

## 2023-03-03 VITALS
SYSTOLIC BLOOD PRESSURE: 137 MMHG | DIASTOLIC BLOOD PRESSURE: 70 MMHG | OXYGEN SATURATION: 91 % | BODY MASS INDEX: 32.28 KG/M2 | TEMPERATURE: 97.5 F | WEIGHT: 200 LBS | HEART RATE: 107 BPM

## 2023-03-03 DIAGNOSIS — E11.65 TYPE 2 DIABETES MELLITUS WITH HYPERGLYCEMIA, UNSPECIFIED WHETHER LONG TERM INSULIN USE (H): ICD-10-CM

## 2023-03-03 DIAGNOSIS — R30.0 DYSURIA: ICD-10-CM

## 2023-03-03 DIAGNOSIS — R30.0 DYSURIA: Primary | ICD-10-CM

## 2023-03-03 DIAGNOSIS — M54.6 ACUTE RIGHT-SIDED THORACIC BACK PAIN: Primary | ICD-10-CM

## 2023-03-03 DIAGNOSIS — C34.91 ADENOCARCINOMA OF RIGHT LUNG (H): ICD-10-CM

## 2023-03-03 LAB
ALBUMIN UR-MCNC: NEGATIVE MG/DL
APPEARANCE UR: CLEAR
BILIRUB UR QL STRIP: NEGATIVE
COLOR UR AUTO: YELLOW
GLUCOSE BLD-MCNC: 248 MG/DL (ref 60–99)
GLUCOSE UR STRIP-MCNC: >=1000 MG/DL
HGB UR QL STRIP: NEGATIVE
KETONES UR STRIP-MCNC: NEGATIVE MG/DL
LEUKOCYTE ESTERASE UR QL STRIP: NEGATIVE
NITRATE UR QL: NEGATIVE
PH UR STRIP: 6 [PH] (ref 5–7)
SP GR UR STRIP: <=1.005 (ref 1–1.03)
UROBILINOGEN UR STRIP-ACNC: 0.2 E.U./DL

## 2023-03-03 PROCEDURE — 36415 COLL VENOUS BLD VENIPUNCTURE: CPT | Performed by: PHYSICIAN ASSISTANT

## 2023-03-03 PROCEDURE — 99214 OFFICE O/P EST MOD 30 MIN: CPT | Performed by: PHYSICIAN ASSISTANT

## 2023-03-03 PROCEDURE — 82947 ASSAY GLUCOSE BLOOD QUANT: CPT | Performed by: PHYSICIAN ASSISTANT

## 2023-03-03 PROCEDURE — 81003 URINALYSIS AUTO W/O SCOPE: CPT | Performed by: PHYSICIAN ASSISTANT

## 2023-03-03 NOTE — TELEPHONE ENCOUNTER
Number dialed not in service.  Please recheck number      Brodie SHIMA Negron ([facility]) at 2:21 PM on 3/3/2023

## 2023-03-03 NOTE — TELEPHONE ENCOUNTER
[facility] Call Center    Phone Message    May a detailed message be left on voicemail: yes     Reason for Call: Order(s): Home Care Orders: Other: PT 1x a week for 1 week, 2x a week for 3 weeks, 1x a week for 3 weeks     Action Taken: Message routed to:  [facility] pcp    Travel Screening: Not Applicable

## 2023-03-03 NOTE — PROGRESS NOTES
Results for orders placed or performed in visit on 03/03/23   UA Macro with Reflex to Micro and Culture - lab collect     Status: Abnormal    Specimen: Urine, Clean Catch   Result Value Ref Range    Color Urine Yellow Colorless, Straw, Light Yellow, Yellow    Appearance Urine Clear Clear    Glucose Urine >=1000 (A) Negative mg/dL    Bilirubin Urine Negative Negative    Ketones Urine Negative Negative mg/dL    Specific Gravity Urine <=1.005 1.003 - 1.035    Blood Urine Negative Negative    pH Urine 6.0 5.0 - 7.0    Protein Albumin Urine Negative Negative mg/dL    Urobilinogen Urine 0.2 0.2, 1.0 E.U./dL    Nitrite Urine Negative Negative    Leukocyte Esterase Urine Negative Negative    Narrative    Microscopic not indicated   Glucose, whole blood     Status: Abnormal   Result Value Ref Range    Glucose Whole Blood 248 (H) 60 - 99 mg/dL             ASSESSMENT:      ICD-10-CM    1. Acute right-sided thoracic back pain  M54.6       2. Dysuria  R30.0 UA Macro with Reflex to Micro and Culture - lab collect     UA Macro with Reflex to Micro and Culture - lab collect     Glucose, whole blood     Glucose, whole blood      3. Adenocarcinoma of right lung (H)  C34.91       4. Type 2 diabetes mellitus with hyperglycemia, unspecified whether long term insulin use (H)  E11.65 Glucose, whole blood     Glucose, whole blood              PLAN: Unclear etiology of right flank/thoracic pain.  Negative urinalysis other than glucosuria.  Limited on labs and imaging here.  Instructed if patient unable to tolerate pain should go to the ER for further evaluation and treatment.  Always possibility that this could be related to her right-sided lung cancer or a muscle strain.  Could try over-the-counter Tylenol..    Advised about symptoms which might herald more serious problems.          Dariela Angel PA-C        Subjective:     67-year-old female with adenocarcinoma right lung, diabetes, COPD, history of hypoxia comes in for mild dysuria and  moderate to severe right flank/thoracic discomfort since she was admitted to the hospital 2/3 through 2/20 for hypoxia.  She had the urine symptoms while in the hospital and was told that she did not have a urinary tract infection.    O2 here today is 85% but she has not had her oxygen on.  She has since put her oxygen on. On azith 250 daily for 30 daily for lung issues. Wondering if jardiance is causing her urine issues.      Allergies   Allergen Reactions     Interferons Dermatitis     Penicillins Hives     Aspirin      325mg      Colon Care        Past Medical History:   Diagnosis Date     Adenocarcinoma, lung (H)      Asthma      Ectopic pregnancy      Esophageal reflux      Pulmonary emphysema (H)     Very severe FEV1<30% predicted     Type II diabetes mellitus (H)        acetylcysteine (MUCOMYST) 10 % nebulizer solution, Inhale 4 mLs into the lungs 4 times daily  albuterol (PROAIR HFA/PROVENTIL HFA/VENTOLIN HFA) 108 (90 Base) MCG/ACT inhaler, INHALE 1 OR 2 PUFFS BY MOUTH EVERY 4 HOURS AS NEEDED  albuterol (PROVENTIL) (2.5 MG/3ML) 0.083% neb solution, Take 1 vial (2.5 mg) by nebulization 4 times daily  alcohol swab prep pads, Use to swab area of injection/xander as directed. (Patient not taking: Reported on 3/1/2023)  amLODIPine (NORVASC) 10 MG tablet, Take 1 tablet (10 mg) by mouth daily  aspirin 81 MG EC tablet, Take 1 tablet (81 mg) by mouth daily  azithromycin (ZITHROMAX) 250 MG tablet, Take 1 tablet (250 mg) by mouth daily for 30 days  buPROPion (WELLBUTRIN XL) 150 MG 24 hr tablet, Take 1 tablet (150 mg) by mouth every morning  cetirizine (ZYRTEC) 10 MG tablet, Take 1 tablet (10 mg) by mouth daily  Continuous Blood Gluc  (FREESTYLE GIOVANNI 2 READER) JOSEPHINE, 1 each daily For continuous glucose monitoring (Patient not taking: Reported on 3/1/2023)  Continuous Blood Gluc Sensor (FREESTYLE GIOVANNI 14 DAY SENSOR) MISC, Change every 14 days. (Patient not taking: Reported on 3/1/2023)  Continuous Blood Gluc  Sensor (FREESTYLE GIOVANNI 2 SENSOR) [facility], 1 each every 14 days For use with Freestyle Giovanni 2  for continuous monitioring of blood glucose levels. Replace sensor every 14 days. (Patient not taking: Reported on 3/1/2023)  Easy Comfort Lancets MISC,   empagliflozin (JARDIANCE) 25 MG TABS tablet, Take 1 tablet (25 mg) by mouth daily  fluticasone (FLONASE) 50 MCG/ACT nasal spray, Spray 1 spray into both nostrils daily Use at night before bed  Fluticasone-Umeclidin-Vilanterol (TRELEGY ELLIPTA) 200-62.5-25 MCG/INH oral inhaler, Inhale 1 puff into the lungs daily  glipiZIDE (GLUCOTROL) 5 MG tablet, Take 2 tablets (10mg) in the AM and 1 tablet (5mg) at PM daily.  glucosamine-chondroitin 500-400 MG tablet, Take 1 tablet by mouth daily  ipratropium - albuterol 0.5 mg/2.5 mg/3 mL (DUONEB) 0.5-2.5 (3) MG/3ML neb solution, Take 1 vial by nebulization every 6 hours as needed for shortness of breath, wheezing or cough  ketotifen (ZADITOR) 0.025 % ophthalmic solution, Place 1 drop into both eyes daily  Lancet Devices (EASY MINI EJECT LANCING DEVICE) MISC,   multivitamin w/minerals (THERA-VIT-M) tablet, Take 1 tablet by mouth daily  nicotine (NICODERM CQ) 14 MG/24HR 24 hr patch, Place 1 patch onto the skin daily for 30 days  nicotine (NICORETTE) 4 MG lozenge, Place 1 lozenge (4 mg) inside cheek every hour as needed for smoking cessation  nicotine (NICOTROL) 10 MG inhaler, Use 1 cartridge as needed for urge to smoke by puffing over course of 20min.  Use 6-16 cart/day; reduce number of cart/day over 6-12 weeks.  omega-3 acid ethyl esters (LOVAZA) 1 g capsule, Take 2 capsules (2 g) by mouth 2 times daily Please call [phone] to schedule follow up visit for more refills.  omeprazole (PRILOSEC) 20 MG DR capsule, Take 1 capsule (20 mg) by mouth 2 times daily  polyethylene glycol-propylene glycol (SYSTANE) 0.4-0.3 % SOLN ophthalmic solution, Place 1 drop into both eyes 4 times daily (Patient not taking: Reported on  3/1/2023)  predniSONE (DELTASONE) 20 MG tablet, Take 0.5 tablets (10 mg) by mouth daily  pregabalin (LYRICA) 150 MG capsule, Take 1 capsule (150 mg) by mouth 2 times daily  propylene glycol (SYSTANE BALANCE) 0.6 % SOLN ophthalmic solution,   senna-docusate (SENOKOT-S/PERICOLACE) 8.6-50 MG tablet, Take 1 tablet by mouth At Bedtime for 30 days  STATIN NOT PRESCRIBED (INTENTIONAL), Please choose reason not prescribed from choices below. (Patient not taking: Reported on 3/1/2023)    No current facility-administered medications on file prior to visit.      Social History     Tobacco Use     Smoking status: Every Day     Packs/day: 0.25     Types: Cigarettes     Smokeless tobacco: Former     Tobacco comments:     01/02/2023 Patient using 14 mg patch, wants to have prescription for Nicotrol inhaler, took workbook   Substance Use Topics     Alcohol use: Yes     Comment: sometime     Drug use: No       ROS:  General: negative for fever  ABD: Denies abd pain  : as above    OBJECTIVE:  /70 (BP Location: Left arm, Patient Position: Sitting, Cuff Size: Adult Regular)   Pulse 107   Temp 97.5  F (36.4  C) (Tympanic)   Wt 90.7 kg (200 lb)   SpO2 (!) 85%   BMI 32.28 kg/m      General:   awake, alert, and cooperative.  NAD.   Head: Normocephalic, atraumatic.  Eyes: Conjunctiva clear, non icteric.   ABD: soft, no tenderness to palpation , no rigidity, guarding or rebound . R CVAT.  Neuro: Alert and oriented - normal speech.

## 2023-03-03 NOTE — TELEPHONE ENCOUNTER
[facility] Call Center    Phone Message    May a detailed message be left on voicemail: yes     Reason for Call: Other: Nurse states patient let her know that she think she has a UTI, Elaina is hoping there can be an lab order to check this and she could collect it. Please call elaina with any questions.      Action Taken: Message routed to:  [facility] pcp    Travel Screening: Not Applicable

## 2023-03-03 NOTE — TELEPHONE ENCOUNTER
Called Esther and left a secure VM.   Gave verbal orders per Mitzi Nettles CNP for Order(s): Home Care Orders: Other: PT 1x a week for 1 week, 2x a week for 3 weeks, 1x a week for 3 weeks         Brodie Negron CMA ([facility]) at 2:23 PM on 3/3/2023

## 2023-03-03 NOTE — TELEPHONE ENCOUNTER
[facility] Call Center    Phone Message    May a detailed message be left on voicemail: yes     Reason for Call: Order(s): Home Care Orders: Skilled Nursing:  Nursing 2x 2weeks  and 1x for 2 weeks  to address safety of ADLS     Action Taken: Message routed to:  [facility] pcp    Travel Screening: Not Applicable

## 2023-03-06 ENCOUNTER — DOCUMENTATION ONLY (OUTPATIENT)
Dept: INTERNAL MEDICINE | Facility: CLINIC | Age: 68
End: 2023-03-06

## 2023-03-06 DIAGNOSIS — J44.9 CHRONIC OBSTRUCTIVE PULMONARY DISEASE, UNSPECIFIED COPD TYPE (H): ICD-10-CM

## 2023-03-06 NOTE — PROGRESS NOTES
Type of Form Received: order    Form Received (Date) 3/6/23   Form Filled out Yes 3/14/23   Placed in provider folder Yes

## 2023-03-06 NOTE — TELEPHONE ENCOUNTER
[facility] Call Center    Phone Message    May a detailed message be left on voicemail: yes     Reason for Call: Other: Per pt wanted to give todays number to writer. Per pt will bring meter in tomorrow. Surgrr were high today 03/06/23 :  [phone]  [phone]  [phone]  [phone]  [phone]  [phone]        Action Taken: Message routed to:  [facility] ENDO    Travel Screening: Not Applicable

## 2023-03-06 NOTE — PROGRESS NOTES
Type of Form Received: [facility]    Form Received (Date) 3/6/23   Form Filled out No   Placed in provider folder Yes

## 2023-03-07 ENCOUNTER — DOCUMENTATION ONLY (OUTPATIENT)
Dept: INTERNAL MEDICINE | Facility: CLINIC | Age: 68
End: 2023-03-07
Payer: COMMERCIAL

## 2023-03-07 ENCOUNTER — TELEPHONE (OUTPATIENT)
Dept: ONCOLOGY | Facility: CLINIC | Age: 68
End: 2023-03-07
Payer: COMMERCIAL

## 2023-03-07 NOTE — TELEPHONE ENCOUNTER
I will be seeing patient tomorrow, as will Nithya Narvaez in diabetes education. We will make plans tomorrow and I will be in contact with Nithya Narvaez.

## 2023-03-07 NOTE — PROGRESS NOTES
Type of Form Received: order    Form Received (Date) 3/7/23   Form Filled out Yes 3/14/23   Placed in provider folder Yes

## 2023-03-07 NOTE — PROGRESS NOTES
CLINICAL NUTRITION SERVICES- ONCOLOGY DISTRESS SCREENING     Identified Concern and Score From Distress Screenin. How concerned are you about your ability to eat? :  10  2. How concerned are you about unintended weight loss or your current weight? : 0     Date of Distress Screening: 3/1     Findings: Jenn reports that her appetite is poor and she feels like food sticks in her throat when swallowing. Pt has seen SLP and GI for this and is taking omeprazole.      Intervention: Discussed current PO intake, eating soft foods, sitting up, and eating small frequent meals as tolerated.      Follow-up Required: PRN, pt already has GI f/u scheduled.     Soha Morgan RD, LD  [phone]

## 2023-03-08 ENCOUNTER — VIRTUAL VISIT (OUTPATIENT)
Dept: EDUCATION SERVICES | Facility: CLINIC | Age: 68
End: 2023-03-08
Payer: COMMERCIAL

## 2023-03-08 DIAGNOSIS — E11.42 TYPE 2 DIABETES MELLITUS WITH DIABETIC POLYNEUROPATHY, WITHOUT LONG-TERM CURRENT USE OF INSULIN (H): Primary | ICD-10-CM

## 2023-03-08 PROCEDURE — 99207 PR NO BILLABLE SERVICE THIS VISIT: CPT | Mod: VID

## 2023-03-08 RX ORDER — ALBUTEROL SULFATE 90 UG/1
AEROSOL, METERED RESPIRATORY (INHALATION)
Qty: 17 G | Refills: 0 | Status: SHIPPED | OUTPATIENT
Start: 2023-03-08 | End: 2023-05-04

## 2023-03-08 NOTE — PROGRESS NOTES
This appointment was a no show.  Texted video link and called and left message.  Nithya Narvaez RN,[facility]

## 2023-03-08 NOTE — TELEPHONE ENCOUNTER
ALBUTEROL HFA INHALER 8.5GM 90MCG      Last Written Prescription Date:  6/1/22  Last Fill Quantity: 18g,   # refills: 3  Last Office Visit : 1/27/22  Future Office visit:  3/16/23    Routing refill request to provider for review/approval because:  No ACT on file    90d basilio refill sent, routed to clinic staff for follow up

## 2023-03-09 ENCOUNTER — TELEPHONE (OUTPATIENT)
Dept: RESPIRATORY THERAPY | Facility: CLINIC | Age: 68
End: 2023-03-09
Payer: COMMERCIAL

## 2023-03-09 ENCOUNTER — TELEPHONE (OUTPATIENT)
Dept: ENDOCRINOLOGY | Facility: CLINIC | Age: 68
End: 2023-03-09
Payer: COMMERCIAL

## 2023-03-09 NOTE — TELEPHONE ENCOUNTER
Spoke briefly with Jenn today for ongoing COPD education. Jenn was discharged with the use of a new Trilogy NIV, Vest therapy, and the Life 2000 ventilatory assist device for ambulation.    Jenn was having a visit with her home care RN when I called. Briefly discussed her new devices and answered her questions. She shared that the Trilogy is helping with her sleep and that although she wakes up early she feels more rested than previously. She also shared that she is seeing benefits with her Life 2000 device.    She acknowledges that she still has my contact information. I will reach out to Jenn in a couple of weeks.    SILVESTRE Mistry, RRT, CTTS  Chronic Pulmonary Disease Specialist  Office: [phone]   Pager: [phone]

## 2023-03-10 ENCOUNTER — DOCUMENTATION ONLY (OUTPATIENT)
Dept: INTERNAL MEDICINE | Facility: CLINIC | Age: 68
End: 2023-03-10
Payer: COMMERCIAL

## 2023-03-10 NOTE — PROGRESS NOTES
Type of Form Received: [facility]     Form Received (Date) 3/10/23   Form Filled out Yes 3/15/23   Placed in provider folder Yes

## 2023-03-10 NOTE — TELEPHONE ENCOUNTER
No-show to both diabetes education visit and endocrinology visit on 3/8/2023.  Patient had called the hour before endocrinology visit to mention that her ride did not come: We contacted Ms. Aparicio to convert to a video visit but were not able to reach her to make this change.  I will ask schedulers to reach out to patient and offer visit with diabetes care and .  She has follow-up with me in 4/2023.

## 2023-03-13 NOTE — TELEPHONE ENCOUNTER
[facility] Call Center    Phone Message    May a detailed message be left on voicemail: yes     Reason for Call: Other: Per Cleopatra homecare RN would like to be kept in the loop of anything that will be changing so she can help pt get to her appts. Please do call Cleopatra to update her about pt having a sonner appt. thank you!     Action Taken: Message routed to:  [facility] ([facility]): Endo    Travel Screening: Not Applicable

## 2023-03-14 ENCOUNTER — MEDICAL CORRESPONDENCE (OUTPATIENT)
Dept: HEALTH INFORMATION MANAGEMENT | Facility: CLINIC | Age: 68
End: 2023-03-14
Payer: COMMERCIAL

## 2023-03-14 DIAGNOSIS — Z53.9 DIAGNOSIS NOT YET DEFINED: Primary | ICD-10-CM

## 2023-03-14 PROCEDURE — G0179 MD RECERTIFICATION HHA PT: HCPCS | Performed by: NURSE PRACTITIONER

## 2023-03-15 ENCOUNTER — TELEPHONE (OUTPATIENT)
Dept: ENDOCRINOLOGY | Facility: CLINIC | Age: 68
End: 2023-03-15
Payer: COMMERCIAL

## 2023-03-15 ENCOUNTER — DOCUMENTATION ONLY (OUTPATIENT)
Facility: CLINIC | Age: 68
End: 2023-03-15
Payer: COMMERCIAL

## 2023-03-15 DIAGNOSIS — E11.65 TYPE 2 DIABETES MELLITUS WITH HYPERGLYCEMIA, WITHOUT LONG-TERM CURRENT USE OF INSULIN (H): ICD-10-CM

## 2023-03-15 NOTE — PROGRESS NOTES
HOME VISIT APPT DATE: 3/14/2023    ARRIVED AT PATIENT'S HOME FOR OUR SCHEDULED HOME VISIT TIME. PATIENT NEEDED TO COME AND LET ME IN FROM THE ENTRY AREA WHICH REALLY MAKES HER SOB. ONCE I ARRIVED AT HER APT I WENT AND CHANGED ALL OF HER SUPPLIES AND I GAVE HER A COUPLE NEW MASKS. I ALSO TALKED TO HER ABOUT A ROLLING STAND. SHE WOULD LIKE ME TO BRING HER ONE WHEN I GO OUT THERE AGAIN. I WILL CALL HER AND SCHEDULE THAT TODAY. PATIENT ALSO WEARS A [id number] DURING THE DAY AND SHE HAS A [employer] VEST.    DX: CRF 2ND TO COPD, LUNG CA AND RLL LOBECTOMY  VITALS:  BREATH SOUNDS: COURSE AND DIMINISHED  SECRETIONS: NO COUGH OR SECRETIONS  LOC: ALERT AND ORIENTED     SETUP DATE: 2/24/2023  DEVICE TYPE: TRILOGY  SETTINGS (include mode): AVAPS AE, , RR 14, EPAP 5-12, PS 4-30  COMFORT SETTINGS: IT 1.5SEC (0.3-3.0) MAX PRESSURE 40  INTERFACE: AIRTOUCH AND AIRFIT F20 MEDIUM  CIRCUIT/HUMIDITY: HEATED  ALARMS: ALARMS OFF  O2 BLEED IN (YES/NO): YES, 3L/M    VENTILATOR INSPECTION DONE: YES SETTINGS CHECK: YES ALARM CHECK: YES   VENTILATOR SETTINGS VERIFIED: YES   CIRCUIT CHECK: YES CHANGED: YES CIRCUIT SUPPLIES GIVEN: YES  MASK: AIRFIT F20 TYPE: FFM CONDITION: GOOD REPLACED: YES, BUT LEFT IN CASE PATIENT WANTED TO KEEP USING IT  FILTERS: EXTERNAL CHANGED W/CIRCUIT YES LEFT WITH SUPPLIES YES   OXYGEN EQUIPMENT REVIEWED IF APPLICABLE: YES    MD NAME/PHONE/FAX: BRYAN NAVA  CAREGIVER NAME/PHONE: [phone]                        BRAEDEN DOMINGUEZ-KENNEDI  [phone]

## 2023-03-16 ENCOUNTER — TELEPHONE (OUTPATIENT)
Dept: ENDOCRINOLOGY | Facility: CLINIC | Age: 68
End: 2023-03-16

## 2023-03-16 ENCOUNTER — DOCUMENTATION ONLY (OUTPATIENT)
Dept: INTERNAL MEDICINE | Facility: CLINIC | Age: 68
End: 2023-03-16

## 2023-03-16 ENCOUNTER — MEDICAL CORRESPONDENCE (OUTPATIENT)
Dept: HEALTH INFORMATION MANAGEMENT | Facility: CLINIC | Age: 68
End: 2023-03-16

## 2023-03-16 ENCOUNTER — OFFICE VISIT (OUTPATIENT)
Dept: INTERNAL MEDICINE | Facility: CLINIC | Age: 68
End: 2023-03-16
Payer: COMMERCIAL

## 2023-03-16 VITALS
HEART RATE: 99 BPM | WEIGHT: 199.6 LBS | OXYGEN SATURATION: 95 % | HEIGHT: 66 IN | DIASTOLIC BLOOD PRESSURE: 74 MMHG | BODY MASS INDEX: 32.08 KG/M2 | SYSTOLIC BLOOD PRESSURE: 132 MMHG

## 2023-03-16 DIAGNOSIS — J44.1 COPD EXACERBATION (H): ICD-10-CM

## 2023-03-16 DIAGNOSIS — Z53.9 DIAGNOSIS NOT YET DEFINED: Primary | ICD-10-CM

## 2023-03-16 DIAGNOSIS — F17.210 CIGARETTE SMOKER: ICD-10-CM

## 2023-03-16 DIAGNOSIS — J96.21 ACUTE AND CHRONIC RESPIRATORY FAILURE WITH HYPOXIA (H): ICD-10-CM

## 2023-03-16 DIAGNOSIS — E11.9 TYPE 2 DIABETES MELLITUS WITHOUT COMPLICATION, WITHOUT LONG-TERM CURRENT USE OF INSULIN (H): ICD-10-CM

## 2023-03-16 PROCEDURE — 99215 OFFICE O/P EST HI 40 MIN: CPT | Performed by: NURSE PRACTITIONER

## 2023-03-16 RX ORDER — GLIPIZIDE 5 MG/1
TABLET ORAL
Qty: 90 TABLET | Refills: 0 | Status: SHIPPED | OUTPATIENT
Start: 2023-03-16 | End: 2023-06-15

## 2023-03-16 RX ORDER — BUPROPION HYDROCHLORIDE 300 MG/1
300 TABLET ORAL EVERY MORNING
Qty: 90 TABLET | Refills: 3 | Status: SHIPPED | OUTPATIENT
Start: 2023-03-16 | End: 2024-01-11

## 2023-03-16 RX ORDER — ALBUTEROL SULFATE 0.83 MG/ML
2.5 SOLUTION RESPIRATORY (INHALATION) 4 TIMES DAILY
Qty: 360 ML | Refills: 0 | Status: SHIPPED | OUTPATIENT
Start: 2023-03-16 | End: 2024-02-05

## 2023-03-16 RX ORDER — BUPROPION HYDROCHLORIDE 150 MG/1
300 TABLET ORAL EVERY MORNING
Qty: 60 TABLET | Refills: 3 | Status: SHIPPED | OUTPATIENT
Start: 2023-03-16 | End: 2023-03-16 | Stop reason: DRUGHIGH

## 2023-03-16 RX ORDER — ACETYLCYSTEINE 100 MG/ML
4 SOLUTION ORAL; RESPIRATORY (INHALATION) 4 TIMES DAILY
Qty: 480 ML | Refills: 0 | Status: SHIPPED | OUTPATIENT
Start: 2023-03-16 | End: 2024-03-20

## 2023-03-16 NOTE — TELEPHONE ENCOUNTER
Received fax request for refill on Jardiance 25mg Tabs to [facility] - [facility], [place] - [address]

## 2023-03-16 NOTE — NURSING NOTE
Jenn Aparicio is a 67 year old female patient that presents today in clinic for the following:    Chief Complaint   Patient presents with     Hospital F/U     High blood sugar concerns, medication review     The patient's allergies and medications were reviewed as noted. A set of vitals were recorded as noted without incident. The patient does not have any other questions for the provider.    Drew Hunter, EMT at 12:03 PM on 3/16/2023

## 2023-03-18 NOTE — PROGRESS NOTES
S: Jenn Aparicio is a 67 year old female here for hospital discharge follow-up.  She is weaning off prednisone and has 2 weeks of therapy left.  Her glucose readings have been high.    She was discharged on azithromycin but thought she was having side effects of dizziness, vision changes, balance issues, tremor so she stopped it a week ago.Her side effects improved.    She was discharged on Wellbutrin 150 mg but wants to increase the dose.    She is taking glipizide 5 mg bid and Jardiance 25 mg a day.            Patient Active Problem List   Diagnosis     Vaginitis     Postmenopausal bleeding     Cervical stenosis (uterine cervix)     Pulmonary emphysema (H)     Type 2 diabetes mellitus without complication, without long-term current use of insulin (H)     Primary lung adenocarcinoma (H) STAGE: IA2 [id number] poorly diff adeno RLL, then [id number] IA2 suspected NSCLC RUL, medically inoperable      Chronic obstructive pulmonary disease, unspecified COPD type (H)     Malnutrition (H)     Diabetic neuropathy (H)     Hypoxia     Shortness of breath     Chest pain, unspecified type     Hypokalemia     Gastroesophageal reflux disease without esophagitis     Esophageal dysphagia     COPD exacerbation (H)     Acute and chronic respiratory failure with hypoxia (H)            Past Medical History:   Diagnosis Date     Adenocarcinoma, lung (H)      Asthma      Ectopic pregnancy      Esophageal reflux      Pulmonary emphysema (H)     Very severe FEV1<30% predicted     Type II diabetes mellitus (H)             Past Surgical History:   Procedure Laterality Date     2/8/16                R thoracotomy, RLL lobectomy (Dr. Cunha). Adenocarcinoma, 1.1 cm, assoicated with atypical adenomatous hyperplasia Right 02/08/2016    R thoracotomy, RLL lobectomy (Dr. Cunha). Adenocarcinoma, 1.1 cm, assoicated with atypical adenomatous hyperplasia     ESOPHAGOSCOPY, GASTROSCOPY, DUODENOSCOPY (EGD), COMBINED N/A 8/16/2022    Procedure:  ESOPHAGOGASTRODUODENOSCOPY (EGD);  Surgeon: Travis Briseno MD;  Location:  GI     ORTHOPEDIC SURGERY              Social History     Tobacco Use     Smoking status: Every Day     Packs/day: 0.25     Types: Cigarettes     Smokeless tobacco: Former     Tobacco comments:     01/02/2023 Patient using 14 mg patch, wants to have prescription for Nicotrol inhaler, took workbook   Substance Use Topics     Alcohol use: Yes     Comment: sometime            Family History   Problem Relation Age of Onset     Lung Cancer Sister      Depression Daughter      Alcohol/Drug Other         self     Diabetes Other         self     Thyroid Disease Other         self     Asthma Other         self               Allergies   Allergen Reactions     Interferons Dermatitis     Penicillins Hives     Aspirin      325mg      Colon Care             Current Outpatient Medications   Medication Sig Dispense Refill     acetylcysteine (MUCOMYST) 10 % nebulizer solution Inhale 4 mLs into the lungs 4 times daily 480 mL 0     albuterol (PROAIR HFA/PROVENTIL HFA/VENTOLIN HFA) 108 (90 Base) MCG/ACT inhaler USE 1 OR 2 INHALATIONS EVERY 4 HOURS AS NEEDED *Keep appt on 3/16/23 for further refills* 17 g 0     albuterol (PROVENTIL) (2.5 MG/3ML) 0.083% neb solution Take 1 vial (2.5 mg) by nebulization 4 times daily 360 mL 0     alpha-lipoic acid 100 MG capsule Take 200 mg by mouth daily as needed       amLODIPine (NORVASC) 10 MG tablet Take 1 tablet (10 mg) by mouth daily 90 tablet 3     aspirin 81 MG EC tablet Take 1 tablet (81 mg) by mouth daily 90 tablet 3     buPROPion (WELLBUTRIN XL) 300 MG 24 hr tablet Take 1 tablet (300 mg) by mouth every morning 90 tablet 3     cetirizine (ZYRTEC) 10 MG tablet Take 1 tablet (10 mg) by mouth daily 30 tablet 10     Continuous Blood Gluc Sensor (FREESTYLE GIOVANNI 2 SENSOR) [facility] 1 each every 14 days For use with Freestyle Giovanni 2  for continuous monitioring of blood glucose levels. Replace sensor every 14 days. 2 each  11     Easy Comfort Lancets MISC        empagliflozin (JARDIANCE) 25 MG TABS tablet Take 1 tablet (25 mg) by mouth daily 90 tablet 1     fluticasone (FLONASE) 50 MCG/ACT nasal spray Spray 1 spray into both nostrils daily Use at night before bed 15.8 mL 7     Fluticasone-Umeclidin-Vilanterol (TRELEGY ELLIPTA) 200-62.5-25 MCG/INH oral inhaler Inhale 1 puff into the lungs daily 28 each 5     glipiZIDE (GLUCOTROL) 5 MG tablet Take 2 tablets (10mg) in the AM and 2 tablet (5mg) at PM daily. 90 tablet 0     glucosamine-chondroitin 500-400 MG tablet Take 1 tablet by mouth daily 90 tablet 3     ipratropium - albuterol 0.5 mg/2.5 mg/3 mL (DUONEB) 0.5-2.5 (3) MG/3ML neb solution Take 1 vial by nebulization every 6 hours as needed for shortness of breath, wheezing or cough       ketotifen (ZADITOR) 0.025 % ophthalmic solution Place 1 drop into both eyes daily       Lancet Devices (EASY MINI EJECT LANCING DEVICE) MISC        Misc. Devices (PULSE OXIMETER FOR FINGER) MISC 1 Device as needed (copd) 1 each 0     multivitamin w/minerals (THERA-VIT-M) tablet Take 1 tablet by mouth daily 30 tablet 11     nicotine (NICORETTE) 4 MG lozenge Place 1 lozenge (4 mg) inside cheek every hour as needed for smoking cessation 100 lozenge 0     nicotine (NICOTROL) 10 MG inhaler Use 1 cartridge as needed for urge to smoke by puffing over course of 20min.  Use 6-16 cart/day; reduce number of cart/day over 6-12 weeks. 160 each 0     omega-3 acid ethyl esters (LOVAZA) 1 g capsule Take 2 capsules (2 g) by mouth 2 times daily Please call [phone] to schedule follow up visit for more refills. 360 capsule 0     omeprazole (PRILOSEC) 20 MG DR capsule Take 1 capsule (20 mg) by mouth 2 times daily 180 capsule 3     polyethylene glycol-propylene glycol (SYSTANE) 0.4-0.3 % SOLN ophthalmic solution Place 1 drop into both eyes 4 times daily 5 mL 11     predniSONE (DELTASONE) 20 MG tablet Take 0.5 tablets (10 mg) by mouth daily 32 tablet 0     pregabalin  "(LYRICA) 150 MG capsule Take 1 capsule (150 mg) by mouth 2 times daily 60 capsule 3     propylene glycol (SYSTANE BALANCE) 0.6 % SOLN ophthalmic solution        senna-docusate (SENOKOT-S/PERICOLACE) 8.6-50 MG tablet Take 1 tablet by mouth At Bedtime for 30 days 30 tablet 0     STATIN NOT PRESCRIBED (INTENTIONAL) Please choose reason not prescribed from choices below.       alcohol swab prep pads Use to swab area of injection/xander as directed. (Patient not taking: Reported on 3/1/2023) 100 each 1     Continuous Blood Gluc  (FREESTYLE GIOVANNI 2 READER) JOSEPHINE 1 each daily For continuous glucose monitoring (Patient not taking: Reported on 3/1/2023) 1 each 0     Continuous Blood Gluc Sensor (FREESTYLE GIOVANNI 14 DAY SENSOR) MISC Change every 14 days. (Patient not taking: Reported on 3/1/2023) 2 each 11       REVIEW OF SYSTEMS:  See above.    O:   /74 (BP Location: Right arm, Patient Position: Sitting, Cuff Size: Adult Large)   Pulse 99   Ht 1.676 m (5' 5.98\")   Wt 90.5 kg (199 lb 9.6 oz)   SpO2 95%   BMI 32.23 kg/m    GENERAL APPEARANCE: healthy, alert and no distress  EYES: EOMI,  PERRL, sclera white, conjunctiva normal.  RESP: decreased breath sounds throughout, lungs clear to auscultation - no rales, rhonchi or wheezes  CV: regular rates and rhythm, normal S1 S2, no S3 or S4 and no murmur, click or rub   NEURO: alert and oriented.  Good historian.  MUSK: ambulatory..  EXT: warm.  Edema NO   PSYCHE: normal.    The 10-year ASCVD risk score (Chun TENORIO, et al., 2019) is: 31.9%    Values used to calculate the score:      Age: 67 years      Sex: Female      Is Non- : No      Diabetic: Yes      Tobacco smoker: Yes      Systolic Blood Pressure: 132 mmHg      Is BP treated: Yes      HDL Cholesterol: 58 mg/dL      Total Cholesterol: 251 mg/dL    A/P:  Jenn was seen today for hospital f/u.    Diagnoses and all orders for this visit:    Cigarette smoker  -     Discontinue: buPROPion " (WELLBUTRIN XL) 150 MG 24 hr tablet; Take 2 tablets (300 mg) by mouth every morning  -     buPROPion (WELLBUTRIN XL) 300 MG 24 hr tablet; Take 1 tablet (300 mg) by mouth every morning    Acute and chronic respiratory failure with hypoxia (H)  -     acetylcysteine (MUCOMYST) 10 % nebulizer solution; Inhale 4 mLs into the lungs 4 times daily    COPD exacerbation (H)  -     albuterol (PROVENTIL) (2.5 MG/3ML) 0.083% neb solution; Take 1 vial (2.5 mg) by nebulization 4 times daily  -     Misc. Devices (PULSE OXIMETER FOR FINGER) MISC; 1 Device as needed (copd)    Type 2 diabetes mellitus without complication, without long-term current use of insulin (H)  -     glipiZIDE (GLUCOTROL) 5 MG tablet; Take 2 tablets (10mg) in the AM and 2 tablet (5mg) at PM daily. This is an increase for 5 mg a day.She will continue to monitor glucoses.       The patient voiced understanding of the information discussed and all questions were answered.     I spent a total of 50 minutes in the care of this pt during today's office visit. This time includes reviewing the patient's chart and prior history, obtaining a history, performing an examination and evaluation and counseling the patient. This time also includes ordering medications or tests necessary in addition to communication to other member's of the patient's health care team. Time spent in documentation and care coordination is included.     Mitzi LABOY, CNP

## 2023-03-22 ENCOUNTER — ONCOLOGY VISIT (OUTPATIENT)
Dept: ONCOLOGY | Facility: CLINIC | Age: 68
End: 2023-03-22
Attending: INTERNAL MEDICINE
Payer: COMMERCIAL

## 2023-03-22 ENCOUNTER — ANCILLARY PROCEDURE (OUTPATIENT)
Dept: CT IMAGING | Facility: CLINIC | Age: 68
End: 2023-03-22
Attending: INTERNAL MEDICINE
Payer: COMMERCIAL

## 2023-03-22 VITALS
SYSTOLIC BLOOD PRESSURE: 132 MMHG | TEMPERATURE: 98.3 F | DIASTOLIC BLOOD PRESSURE: 72 MMHG | OXYGEN SATURATION: 97 % | BODY MASS INDEX: 32.22 KG/M2 | RESPIRATION RATE: 16 BRPM | HEART RATE: 106 BPM | WEIGHT: 199.52 LBS

## 2023-03-22 DIAGNOSIS — C34.31 MALIGNANT NEOPLASM OF LOWER LOBE OF RIGHT LUNG (H): ICD-10-CM

## 2023-03-22 DIAGNOSIS — C34.31 MALIGNANT NEOPLASM OF LOWER LOBE OF RIGHT LUNG (H): Primary | ICD-10-CM

## 2023-03-22 PROCEDURE — 71250 CT THORAX DX C-: CPT | Mod: GC | Performed by: RADIOLOGY

## 2023-03-22 PROCEDURE — G0463 HOSPITAL OUTPT CLINIC VISIT: HCPCS | Performed by: INTERNAL MEDICINE

## 2023-03-22 PROCEDURE — 99214 OFFICE O/P EST MOD 30 MIN: CPT | Performed by: INTERNAL MEDICINE

## 2023-03-22 ASSESSMENT — PAIN SCALES - GENERAL: PAINLEVEL: NO PAIN (0)

## 2023-03-22 NOTE — NURSING NOTE
"Oncology Rooming Note    March 22, 2023 4:01 PM   Jenn Aparicio is a 67 year old female who presents for:    Chief Complaint   Patient presents with     Oncology Clinic Visit     Primary lung adenocarcinoma      Initial Vitals: /72 (BP Location: Right arm, Patient Position: Sitting, Cuff Size: Adult Regular)   Pulse 106   Temp 98.3  F (36.8  C) (Oral)   Resp 16   Wt 90.5 kg (199 lb 8.3 oz)   SpO2 97%   BMI 32.22 kg/m   Estimated body mass index is 32.22 kg/m  as calculated from the following:    Height as of 3/16/23: 1.676 m (5' 5.98\").    Weight as of this encounter: 90.5 kg (199 lb 8.3 oz). Body surface area is 2.05 meters squared.  No Pain (0) Comment: Data Unavailable   No LMP recorded. Patient is postmenopausal.  Allergies reviewed: Yes  Medications reviewed: Yes    Medications: Medication refills not needed today.  Pharmacy name entered into EPIC:    [facility] - [place], [place] - [address] [facility] - [place], [place] - [address] [place] [address]  [facility] - [address] [facility] - [place], [place] - [address] [id number] - [place] [address] AT [facility] [address]    Clinical concerns: Patient reports having a 10 mg prescription of Jardiance as well as 25 mg prescription and does not know which one to take. Please address.       Melissa Red (Nicolle), LPN March 22, 2023 4:03 PM              "

## 2023-03-22 NOTE — LETTER
3/22/2023         RE: Jenn Aparicio  [address]  [place] [address]        Dear Colleague,    Thank you for referring your patient, Jenn Aparicio, to the [facility]. Please see a copy of my visit note below.      [facility]    PATIENT NAME: Jenn Aparicio  MRN # [id number]   DATE OF VISIT: March 22, 2023  YOB: 1955     CANCER TYPE: Lung adenocarcinoma  STAGE: IA2 [id number] poorly diff adeno RLL, then [id number] IA2 suspected NSCLC RUL, medically inoperable     ECOG PS: 2    PD-L1: N/A  Lung panel: N/A  NGS: N/A    SUMMARY  12/24/15 PET/CT  1/13/15 CT lung bx. Adenocarcinoma  2/8/16 R thoracotomy, RLL lobectomy (Dr. Cunha). Adenocarcinoma, 1.1 cm, assoicated with atypical adenomatous hyperplasia  10/18/19 CTA for shortness of breath. New 1.3 cm RUL nodule  11/2019 PET/CT. 1.1 cm RUL hypermetabolic nodule (SUV 12.6)  12/26/19 Brain MRI negative for mets  1/14~1/28/20 SBRT to RUL nodule, 5000 cGy, every other day, 1000 cGy (Dr. Currie at [facility] [place] No bx due to O2 dependence and too much risk  8/19/20 First visit with me   11/29/21 CT chest. New 10 mm RUL nodule  1/11/22 CT chest. New 7.2 mm RUL nodule, unchanged RUL nodules  3/31/22 CT chest. New RUL nodule from Jan 2022 7-->10 mm, new 5 mm ROBERT nodule  5/20~5/24/22 [facility] for COPD exacerbation.   6/24/22 CT chest non contrast.   8/16/22 EGD. 3 cm hiatal hernia, o/w normal  8/31/22 CT chest. 2.5 x 1.3 cm R midlung subpleural nodularity (4:98) previously 2.3 x 1.1 cm, slightly increased solid component   10/5/22 PET/CT. 2 x 1.3 cm irregular opacity posterior RUL (SUV 2.46), 2.4 x 0.8 cm linear opacity (SUV 4.76); there was bronchiectasia on prior imaging. Stable 1.1 x 0.8 cm non FDG avid RUL opacity and unchanged 4 mm posterior RUL nodule. No adenopathy. Inferior right breast uptake (SUV 6.09) likely infectious or inflammatory.   2/3~2/9/23 [facility] for COPD exacerbation  2/4/23 CTA during hospitalization. No  significant change in 2.3 x 1.5 cm spiculated nodule in the right upper lobe (series 7 image 94) with surrounding linear parenchymal opacities and subtle nodularity  2/20~2/24/23 [facility] for COPD exacerbation.     ASSESSMENT AND PLAN  NSCLC, adeno, RUL: See my prior note about lengthy description of the lesion in question. Stable since Feb, which I think is reassuring. Discussed that can't tell whether it's malignant or not, and we should still keep an eye on it. Also discussed again steps thereafter if it indeed turned out to be cancer. Can't do more SBRT there and obviously not a surgical candidate, leaving us with systemic therapy. I would not advocate starting systemic therapy if that small area were a recurrence. In light of all this, favor ongoing surveillance. CT chest abd in 2 months.     DM2:Seeing Dr. Shabazz for the DM, jardiance increased to 25 mg daily. She asked for clarification as she still had 10 mg pills left. Asked her to put those aside and just take the 25 mg daily. Also on glipizide, confirmed she increased as outlined in Dr. Shabazz's note last week.     Peripheral neuropathy: Recently worse - see prior notes - not discussed today         Zarina Leung MD  Associate Professor of Medicine  Hematology, Oncology and Transplantation      SUBJECTIVE  Jenn returns for close interim follow up of R lung nodules.   About the same. No change in breathing. Using O2 more continuously, realizes she needs it all of the time now. Also using a sort of CPAP vs BiPAP device at night, which is helping sleep a lot. Keeping up with vest therapy, nebs - full time job.     PAST MEDICAL HISTORY  Lung adenocarcinoma  DM2 with neuropathy  HCV IA, undetectable in 2019, treated  COPD. O2 dependent since late 2018. PFT 11/26/19. FEV1 0.64 (30%), FVC 1.69 (63%), DLCOunc 9.8 (38%).  HTN  H/o ITP  H/o disk herniation  Ankle surgery  Median neuropathy R  H/o sessile colon polyps 2014, h/o adenomas before that. [place]oscopy  2[place]18 [place] [facility] [place] Sessile serated adenoma x 1, tubular adenoma x 3  H/o chronic LBP  Dyslipidemia  Cataracts    CURRENT OUTPATIENT MEDICATIONS  Current Outpatient Medications   Medication Sig Dispense Refill     acetylcysteine (MUCOMYST) 10 % nebulizer solution Inhale 4 mLs into the lungs 4 times daily 480 mL 0     albuterol (PROAIR HFA/PROVENTIL HFA/VENTOLIN HFA) 108 (90 Base) MCG/ACT inhaler USE 1 OR 2 INHALATIONS EVERY 4 HOURS AS NEEDED *Keep appt on 3/16/23 for further refills* 17 g 0     albuterol (PROVENTIL) (2.5 MG/3ML) 0.083% neb solution Take 1 vial (2.5 mg) by nebulization 4 times daily 360 mL 0     alpha-lipoic acid 100 MG capsule Take 200 mg by mouth daily as needed       amLODIPine (NORVASC) 10 MG tablet Take 1 tablet (10 mg) by mouth daily 90 tablet 3     aspirin 81 MG EC tablet Take 1 tablet (81 mg) by mouth daily 90 tablet 3     buPROPion (WELLBUTRIN XL) 300 MG 24 hr tablet Take 1 tablet (300 mg) by mouth every morning 90 tablet 3     cetirizine (ZYRTEC) 10 MG tablet Take 1 tablet (10 mg) by mouth daily 30 tablet 10     Continuous Blood Gluc  (FREESTYLE GIOVANNI 2 READER) JOSEPHINE 1 each daily For continuous glucose monitoring (Patient not taking: Reported on 3/1/2023) 1 each 0     Continuous Blood Gluc Sensor (FREESTYLE GIOVANNI 14 DAY SENSOR) MISC Change every 14 days. (Patient not taking: Reported on 3/1/2023) 2 each 11     Continuous Blood Gluc Sensor (FREESTYLE GIOVANNI 2 SENSOR) MISC 1 each every 14 days For use with Freestyle Giovanni 2  for continuous monitioring of blood glucose levels. Replace sensor every 14 days. 2 each 11     Easy Comfort Lancets MISC        empagliflozin (JARDIANCE) 25 MG TABS tablet Take 1 tablet (25 mg) by mouth daily 90 tablet 1     fluticasone (FLONASE) 50 MCG/ACT nasal spray Spray 1 spray into both nostrils daily Use at night before bed 15.8 mL 7     Fluticasone-Umeclidin-Vilanterol (TRELEGY ELLIPTA) 200-62.5-25 MCG/INH oral inhaler Inhale 1 puff into the lungs daily  28 each 5     glipiZIDE (GLUCOTROL) 5 MG tablet Take 2 tablets (10mg) in the AM and 2 tablet (5mg) at PM daily. 90 tablet 0     glucosamine-chondroitin 500-400 MG tablet Take 1 tablet by mouth daily 90 tablet 3     ipratropium - albuterol 0.5 mg/2.5 mg/3 mL (DUONEB) 0.5-2.5 (3) MG/3ML neb solution Take 1 vial by nebulization every 6 hours as needed for shortness of breath, wheezing or cough       ketotifen (ZADITOR) 0.025 % ophthalmic solution Place 1 drop into both eyes daily       Lancet Devices (EASY MINI EJECT LANCING DEVICE) MISC        Misc. Devices (PULSE OXIMETER FOR FINGER) MISC 1 Device as needed (copd) 1 each 0     multivitamin w/minerals (THERA-VIT-M) tablet Take 1 tablet by mouth daily 30 tablet 11     nicotine (NICORETTE) 4 MG lozenge Place 1 lozenge (4 mg) inside cheek every hour as needed for smoking cessation 100 lozenge 0     nicotine (NICOTROL) 10 MG inhaler Use 1 cartridge as needed for urge to smoke by puffing over course of 20min.  Use 6-16 cart/day; reduce number of cart/day over 6-12 weeks. 160 each 0     omega-3 acid ethyl esters (LOVAZA) 1 g capsule Take 2 capsules (2 g) by mouth 2 times daily Please call [phone] to schedule follow up visit for more refills. 360 capsule 0     omeprazole (PRILOSEC) 20 MG DR capsule Take 1 capsule (20 mg) by mouth 2 times daily 180 capsule 3     polyethylene glycol-propylene glycol (SYSTANE) 0.4-0.3 % SOLN ophthalmic solution Place 1 drop into both eyes 4 times daily 5 mL 11     predniSONE (DELTASONE) 20 MG tablet Take 0.5 tablets (10 mg) by mouth daily 32 tablet 0     pregabalin (LYRICA) 150 MG capsule Take 1 capsule (150 mg) by mouth 2 times daily 60 capsule 3     propylene glycol (SYSTANE BALANCE) 0.6 % SOLN ophthalmic solution        senna-docusate (SENOKOT-S/PERICOLACE) 8.6-50 MG tablet Take 1 tablet by mouth At Bedtime for 30 days 30 tablet 0     STATIN NOT PRESCRIBED (INTENTIONAL) Please choose reason not prescribed from choices below.        ALLERGIES  Allergies   Allergen Reactions     Interferons Dermatitis     Penicillins Hives     Aspirin      325mg      Colon Care       REVIEW OF SYSTEMS  As above in the HPI, o/w complete 12-point ROS was negative.    PHYSICAL EXAM  ,/72 (BP Location: Right arm, Patient Position: Sitting, Cuff Size: Adult Regular)   Pulse 106   Temp 98.3  F (36.8  C) (Oral)   Resp 16   Wt 90.5 kg (199 lb 8.3 oz)   SpO2 97%   BMI 32.22 kg/m    GEN: NAD  HEENT: EOMI, no icterus, injection or pallor  LUNGS: clear bilaterally  CV: regular, no murmurs, rubs, or gallops  EXT: no edema  NEURO: alert    LABORATORY AND IMAGING STUDIES  CT Chest w/o Contrast  Narrative: EXAMINATION: CT CHEST W/O CONTRAST, 3/1/2023 2:31 PM    TECHNIQUE:  Helical CT images from the thoracic inlet through the  upper abdomen were obtained without intravenous contrast.  Images are  displayed at 1 and 5 mm intervals. Images reviewed in lung, soft  tissue, and bone windows.    Radiation Dose (DLP): 168 mGy*cm    COMPARISON: Chest CT 2/4/2023, 12/1/2022, PET/CT 10/5/2022    HISTORY: Lung cancer, current or r/o recurrence; Lung cancer  staging/restaging; No known/automatically detected potential  contraindications to imaging; Type 2 diabetes mellitus with diabetic  polyneuropathy, without long-term current use of insulin (H)    Per chart, history of RLL adenocarcinoma, s/p RLL lobectomy in 2016.  RUL nodule, suspected NSCLC, s/p SBRT to RUL nodule in 2020.    FINDINGS:    Lungs: Postsurgical changes of right lower lobectomy. Tracheal  secretions/debris in the trachea and right mainstem bronchus. Upper  lobe predominant centrilobular and paraseptal emphysematous changes.  No pleural effusion, pneumothorax, or focal consolidation. Stable  irregular nodule in the posterior right upper lobe measuring 2.3 x 1.4  cm (series 4, image 91), previously 2.1 x 1.4 cm on 12/1/2022 when  measured similarly. However, there appears to be increased soft  tissue  component of this nodule compared to PET/CT from 10/5/2022. Unchanged  adjacent pleural thickening and tethering. There is redemonstration of  mucous plugging with micronodularity in the posterior left upper lobe.  Stable mucous plugging in the lateral right upper lobe. Stable 6 mm  solid pulmonary nodule in the right apex (series 4, image 29).    Mediastinum: The thyroid gland is unremarkable. Atherosclerotic  calcification of the great vessels and aortic arch. Common origin of  the brachiocephalic and left common carotid arteries, normal variant.  The heart is not enlarged. No significant pericardial effusion. The  ascending aorta and main pulmonary artery are normal in caliber. No  mediastinal or axillary lymphadenopathy.    Upper abdomen: Small calcifications in the renal pelves are possibly  vascular. No visualized hydronephrosis.    Bones/soft tissues: 7 mm subcutaneous nodule in the medial left breast  (series 3, image 158), likely sebaceous cyst. Degenerative changes in  the spine. No acute or suspicious osseous lesion.  Impression: IMPRESSION:   1. Redemonstration of 2.3 cm irregular nodule in the posterior right  upper lobe. This appears stable compared to chest CT from 12/1/2022,  however there is increased soft tissue component of the nodule  compared to PET/CT from 10/5/2022, and recurrent malignancy cannot be  excluded.  2. Stable areas of mucous plugging with micronodularity in the left  upper lobe, likely infectious/inflammatory.    I have personally reviewed the examination and initial interpretation  and I agree with the findings.    MARK BARRERA MD         SYSTEM ID:  [id number]     Imaging was personally reviewed and interpreted by me and compared to last CT in Feb.     Zarina Leung MD

## 2023-03-22 NOTE — PROGRESS NOTES
[facility]    PATIENT NAME: Jenn Aparicio  MRN # [id number]   DATE OF VISIT: March 22, 2023  YOB: 1955     CANCER TYPE: Lung adenocarcinoma  STAGE: IA2 [id number] poorly diff adeno RLL, then [id number] IA2 suspected NSCLC RUL, medically inoperable     ECOG PS: 2    PD-L1: N/A  Lung panel: N/A  NGS: N/A    SUMMARY  12/24/15 PET/CT  1/13/15 CT lung bx. Adenocarcinoma  2/8/16 R thoracotomy, RLL lobectomy (Dr. Cunha). Adenocarcinoma, 1.1 cm, assoicated with atypical adenomatous hyperplasia  10/18/19 CTA for shortness of breath. New 1.3 cm RUL nodule  11/2019 PET/CT. 1.1 cm RUL hypermetabolic nodule (SUV 12.6)  12/26/19 Brain MRI negative for mets  1/14~1/28/20 SBRT to RUL nodule, 5000 cGy, every other day, 1000 [place] [facility] [place] No bx due to O2 dependence and too much risk  8/19/20 First visit with me   11/29/21 CT chest. New 10 mm RUL nodule  1/11/22 CT chest. New 7.2 mm RUL nodule, unchanged RUL nodules  3/31/22 CT chest. New RUL nodule from Jan 2022 7-->10 mm, new 5 mm ROBERT nodule  5/20~5/24/22 [facility] for COPD exacerbation.   6/24/22 CT chest non contrast.   8/16/22 EGD. 3 cm hiatal hernia, o/w normal  8/31/22 CT chest. 2.5 x 1.3 cm R midlung subpleural nodularity (4:98) previously 2.3 x 1.1 cm, slightly increased solid component   10/5/22 PET/CT. 2 x 1.3 cm irregular opacity posterior RUL (SUV 2.46), 2.4 x 0.8 cm linear opacity (SUV 4.76); there was bronchiectasia on prior imaging. Stable 1.1 x 0.8 cm non FDG avid RUL opacity and unchanged 4 mm posterior RUL nodule. No adenopathy. Inferior right breast uptake (SUV 6.09) likely infectious or inflammatory.   2/3~2/9/23 [facility] for COPD exacerbation  2/4/23 CTA during hospitalization. No significant change in 2.3 x 1.5 cm spiculated nodule in the right upper lobe (series 7 image 94) with surrounding linear parenchymal opacities and subtle nodularity  2/20~2/24/23 [facility] for COPD exacerbation.     ASSESSMENT AND PLAN  NSCLC, adeno, RUL: See my prior  note about lengthy description of the lesion in question. Stable since Feb, which I think is reassuring. Discussed that can't tell whether it's malignant or not, and we should still keep an eye on it. Also discussed again steps thereafter if it indeed turned out to be cancer. Can't do more SBRT there and obviously not a surgical candidate, leaving us with systemic therapy. I would not advocate starting systemic therapy if that small area were a recurrence. In light of all this, favor ongoing surveillance. CT chest abd in 2 months.     DM2:Seeing Dr. Shabazz for the DM, jardiance increased to 25 mg daily. She asked for clarification as she still had 10 mg pills left. Asked her to put those aside and just take the 25 mg daily. Also on glipizide, confirmed she increased as outlined in Dr. Shabazz's note last week.     Peripheral neuropathy: Recently worse - see prior notes - not discussed today         Zarina Leung MD  Associate Professor of Medicine  Hematology, Oncology and Transplantation      SUBJECTIVE  Jenn returns for close interim follow up of R lung nodules.   About the same. No change in breathing. Using O2 more continuously, realizes she needs it all of the time now. Also using a sort of CPAP vs BiPAP device at night, which is helping sleep a lot. Keeping up with vest therapy, nebs - full time job.     PAST MEDICAL HISTORY  Lung adenocarcinoma  DM2 with neuropathy  HCV IA, undetectable in 2019, treated  COPD. O2 dependent since late 2018. PFT 11/26/19. FEV1 0.64 (30%), FVC 1.69 (63%), DLCOunc 9.8 (38%).  HTN  H/o ITP  H/o disk herniation  Ankle surgery  Median neuropathy R  H/o sessile colon polyps 2014, h/o adenomas before that. Colonoscopy 2/7/18 @ [facility] [place]. Sessile serated adenoma x 1, tubular adenoma x 3  H/o chronic LBP  Dyslipidemia  Cataracts    CURRENT OUTPATIENT MEDICATIONS  Current Outpatient Medications   Medication Sig Dispense Refill     acetylcysteine (MUCOMYST) 10 % nebulizer solution Inhale 4  mLs into the lungs 4 times daily 480 mL 0     albuterol (PROAIR HFA/PROVENTIL HFA/VENTOLIN HFA) 108 (90 Base) MCG/ACT inhaler USE 1 OR 2 INHALATIONS EVERY 4 HOURS AS NEEDED *Keep appt on 3/16/23 for further refills* 17 g 0     albuterol (PROVENTIL) (2.5 MG/3ML) 0.083% neb solution Take 1 vial (2.5 mg) by nebulization 4 times daily 360 mL 0     alpha-lipoic acid 100 MG capsule Take 200 mg by mouth daily as needed       amLODIPine (NORVASC) 10 MG tablet Take 1 tablet (10 mg) by mouth daily 90 tablet 3     aspirin 81 MG EC tablet Take 1 tablet (81 mg) by mouth daily 90 tablet 3     buPROPion (WELLBUTRIN XL) 300 MG 24 hr tablet Take 1 tablet (300 mg) by mouth every morning 90 tablet 3     cetirizine (ZYRTEC) 10 MG tablet Take 1 tablet (10 mg) by mouth daily 30 tablet 10     Continuous Blood Gluc  (FREESTYLE GIOVANNI 2 READER) JOSEPHINE 1 each daily For continuous glucose monitoring (Patient not taking: Reported on 3/1/2023) 1 each 0     Continuous Blood Gluc Sensor (FREESTYLE GIOVANNI 14 DAY SENSOR) MISC Change every 14 days. (Patient not taking: Reported on 3/1/2023) 2 each 11     Continuous Blood Gluc Sensor (FREESTYLE GIOVANNI 2 SENSOR) MISC 1 each every 14 days For use with Freestyle Giovanni 2  for continuous monitioring of blood glucose levels. Replace sensor every 14 days. 2 each 11     Easy Comfort Lancets MISC        empagliflozin (JARDIANCE) 25 MG TABS tablet Take 1 tablet (25 mg) by mouth daily 90 tablet 1     fluticasone (FLONASE) 50 MCG/ACT nasal spray Spray 1 spray into both nostrils daily Use at night before bed 15.8 mL 7     Fluticasone-Umeclidin-Vilanterol (TRELEGY ELLIPTA) 200-62.5-25 MCG/INH oral inhaler Inhale 1 puff into the lungs daily 28 each 5     glipiZIDE (GLUCOTROL) 5 MG tablet Take 2 tablets (10mg) in the AM and 2 tablet (5mg) at PM daily. 90 tablet 0     glucosamine-chondroitin 500-400 MG tablet Take 1 tablet by mouth daily 90 tablet 3     ipratropium - albuterol 0.5 mg/2.5 mg/3 mL (DUONEB)  0.5-2.5 (3) MG/3ML neb solution Take 1 vial by nebulization every 6 hours as needed for shortness of breath, wheezing or cough       ketotifen (ZADITOR) 0.025 % ophthalmic solution Place 1 drop into both eyes daily       Lancet Devices (EASY MINI EJECT LANCING DEVICE) MISC        Misc. Devices (PULSE OXIMETER FOR FINGER) MISC 1 Device as needed (copd) 1 each 0     multivitamin w/minerals (THERA-VIT-M) tablet Take 1 tablet by mouth daily 30 tablet 11     nicotine (NICORETTE) 4 MG lozenge Place 1 lozenge (4 mg) inside cheek every hour as needed for smoking cessation 100 lozenge 0     nicotine (NICOTROL) 10 MG inhaler Use 1 cartridge as needed for urge to smoke by puffing over course of 20min.  Use 6-16 cart/day; reduce number of cart/day over 6-12 weeks. 160 each 0     omega-3 acid ethyl esters (LOVAZA) 1 g capsule Take 2 capsules (2 g) by mouth 2 times daily Please call [phone] to schedule follow up visit for more refills. 360 capsule 0     omeprazole (PRILOSEC) 20 MG DR capsule Take 1 capsule (20 mg) by mouth 2 times daily 180 capsule 3     polyethylene glycol-propylene glycol (SYSTANE) 0.4-0.3 % SOLN ophthalmic solution Place 1 drop into both eyes 4 times daily 5 mL 11     predniSONE (DELTASONE) 20 MG tablet Take 0.5 tablets (10 mg) by mouth daily 32 tablet 0     pregabalin (LYRICA) 150 MG capsule Take 1 capsule (150 mg) by mouth 2 times daily 60 capsule 3     propylene glycol (SYSTANE BALANCE) 0.6 % SOLN ophthalmic solution        senna-docusate (SENOKOT-S/PERICOLACE) 8.6-50 MG tablet Take 1 tablet by mouth At Bedtime for 30 days 30 tablet 0     STATIN NOT PRESCRIBED (INTENTIONAL) Please choose reason not prescribed from choices below.       ALLERGIES  Allergies   Allergen Reactions     Interferons Dermatitis     Penicillins Hives     Aspirin      325mg      Colon Care       REVIEW OF SYSTEMS  As above in the HPI, o/w complete 12-point ROS was negative.    PHYSICAL EXAM  ,/72 (BP Location: Right arm,  Patient Position: Sitting, Cuff Size: Adult Regular)   Pulse 106   Temp 98.3  F (36.8  C) (Oral)   Resp 16   Wt 90.5 kg (199 lb 8.3 oz)   SpO2 97%   BMI 32.22 kg/m    GEN: NAD  HEENT: EOMI, no icterus, injection or pallor  LUNGS: clear bilaterally  CV: regular, no murmurs, rubs, or gallops  EXT: no edema  NEURO: alert    LABORATORY AND IMAGING STUDIES  CT Chest w/o Contrast  Narrative: EXAMINATION: CT CHEST W/O CONTRAST, 3/1/2023 2:31 PM    TECHNIQUE:  Helical CT images from the thoracic inlet through the  upper abdomen were obtained without intravenous contrast.  Images are  displayed at 1 and 5 mm intervals. Images reviewed in lung, soft  tissue, and bone windows.    Radiation Dose (DLP): 168 mGy*cm    COMPARISON: Chest CT 2/4/2023, 12/1/2022, PET/CT 10/5/2022    HISTORY: Lung cancer, current or r/o recurrence; Lung cancer  staging/restaging; No known/automatically detected potential  contraindications to imaging; Type 2 diabetes mellitus with diabetic  polyneuropathy, without long-term current use of insulin (H)    Per chart, history of RLL adenocarcinoma, s/p RLL lobectomy in 2016.  RUL nodule, suspected NSCLC, s/p SBRT to RUL nodule in 2020.    FINDINGS:    Lungs: Postsurgical changes of right lower lobectomy. Tracheal  secretions/debris in the trachea and right mainstem bronchus. Upper  lobe predominant centrilobular and paraseptal emphysematous changes.  No pleural effusion, pneumothorax, or focal consolidation. Stable  irregular nodule in the posterior right upper lobe measuring 2.3 x 1.4  cm (series 4, image 91), previously 2.1 x 1.4 cm on 12/1/2022 when  measured similarly. However, there appears to be increased soft tissue  component of this nodule compared to PET/CT from 10/5/2022. Unchanged  adjacent pleural thickening and tethering. There is redemonstration of  mucous plugging with micronodularity in the posterior left upper lobe.  Stable mucous plugging in the lateral right upper lobe. Stable 6  mm  solid pulmonary nodule in the right apex (series 4, image 29).    Mediastinum: The thyroid gland is unremarkable. Atherosclerotic  calcification of the great vessels and aortic arch. Common origin of  the brachiocephalic and left common carotid arteries, normal variant.  The heart is not enlarged. No significant pericardial effusion. The  ascending aorta and main pulmonary artery are normal in caliber. No  mediastinal or axillary lymphadenopathy.    Upper abdomen: Small calcifications in the renal pelves are possibly  vascular. No visualized hydronephrosis.    Bones/soft tissues: 7 mm subcutaneous nodule in the medial left breast  (series 3, image 158), likely sebaceous cyst. Degenerative changes in  the spine. No acute or suspicious osseous lesion.  Impression: IMPRESSION:   1. Redemonstration of 2.3 cm irregular nodule in the posterior right  upper lobe. This appears stable compared to chest CT from 12/1/2022,  however there is increased soft tissue component of the nodule  compared to PET/CT from 10/5/2022, and recurrent malignancy cannot be  excluded.  2. Stable areas of mucous plugging with micronodularity in the left  upper lobe, likely infectious/inflammatory.    I have personally reviewed the examination and initial interpretation  and I agree with the findings.    MARK BARRERA MD         SYSTEM ID:  [id number]     Imaging was personally reviewed and interpreted by me and compared to last CT in Feb.

## 2023-03-23 ENCOUNTER — MEDICAL CORRESPONDENCE (OUTPATIENT)
Dept: HEALTH INFORMATION MANAGEMENT | Facility: CLINIC | Age: 68
End: 2023-03-23

## 2023-03-23 ENCOUNTER — ALLIED HEALTH/NURSE VISIT (OUTPATIENT)
Dept: EDUCATION SERVICES | Facility: CLINIC | Age: 68
End: 2023-03-23
Payer: COMMERCIAL

## 2023-03-23 DIAGNOSIS — Z53.9 DIAGNOSIS NOT YET DEFINED: Primary | ICD-10-CM

## 2023-03-23 DIAGNOSIS — E11.42 TYPE 2 DIABETES MELLITUS WITH DIABETIC POLYNEUROPATHY, WITHOUT LONG-TERM CURRENT USE OF INSULIN (H): Primary | ICD-10-CM

## 2023-03-23 PROCEDURE — G0108 DIAB MANAGE TRN  PER INDIV: HCPCS

## 2023-03-23 NOTE — PATIENT INSTRUCTIONS
Keep wearing your Marti 2 sensor.  Your blood sugar goals are: Before meals:  mg/dl 2 hrs after a meal: <180  Remember the Marti can falsely alert you to a low if you are laying on the sensor.  Too low is under 70 and you should take about 4 oz of pop or juice if you go under that.    Take the Jardiance 10 mg out of your pill box and insert the Jardiance 25 mg in there instead.  We have a phone visit set up for 4/6 2pm.  Call sooner if you are too high or low.    Nithya Narvaez RN,[address] [address]  Phone: [phone]  [email or web address]

## 2023-03-23 NOTE — PROGRESS NOTES
DIABETES SELF MANAGEMENT EDUCATION:   Jenn Aparicio presents today for education related to Type 2 diabetes.    She is accompanied by self  Referring Provider: Felisha Castro MD    DIABETES RELATED CONCERNS/COMMENTS: neuropathy in hands and feet  Patient's emotional response to diabetes: expresses readiness to learn and concern for health and well-being    Past Diabetes Education: Yes  Support system: family  Living Situation: lives alone, just moved   Occupation: retired    ASSESSMENT:  Patient Problem List and Medication List reviewed for relevant medical history, current medical status, and diabetes risk factors.    Current Diabetes Management per Patient:  Glipizide 5 mg 2 in am and 2 in pm, Jardiance 10 daily--she never increased to the 25 mg dose ordered by lore in January  At risk for hypoglycemia? Yes , Symptoms: Shaky and Confusion and Frequency: none recent  BG meter: Marti 2 sensors   Patient glucose self monitoring as follows: has checked with blood sugars  BG results:       Patient's most recent A1C:   Lab Results   Component Value Date    A1C 9.8 08/31/2022    A1C 8.4 05/20/2022    A1C 8.4 01/27/2022    A1C 8.3 01/11/2022    A1C 7.0 02/24/2021    A1C 6.8 09/08/2020    A1C 7.1 12/01/2015     Nutrition:  Breakfast: (9-10a)  Or skips coffee, cream and raw sugar, skips  Snack:  Lunch:()  Snack:   Dinner: (5-8p) [employer] tuna 6 inch, 12 oz Coke, cookie  Snack:     Appetite has been going down over last year, started supplementing with Ensure but has stopped using them    Physical Activity:     minimal exercise, using walking    Diabetes Complications:  Numbness in hands or feet    INTERVENTION:   Education provided today on:  We discussed her LibreView results.  Her blood sugar is spiking and staying high all evening after she takes Prednisone.  She is currently on 10 mg daily but only had 6 days of it left.  She tells me they want to try her off of it after she finishes her current supply.  She saw her  oncologist yesterday who noted she was not taking the higher dose of Jardiance and told her to start it.      Insulin teaching was done on both the pens and vial and syringe.  Jenn seemed to understand the material.  She used to give her mother insulin.      Hypoglycemia protocol discussed in the event that she would need to use insulin.    PLAN:  FOLLOW-UP:  4/6/23 2pm  Time Spent: 60 minutes  Encounter Type: Individual    Any diabetes medication dose changes were made via the CDE Protocol and Collaborative Practice Agreement with  Physicians.

## 2023-03-28 ENCOUNTER — DOCUMENTATION ONLY (OUTPATIENT)
Dept: INTERNAL MEDICINE | Facility: CLINIC | Age: 68
End: 2023-03-28
Payer: COMMERCIAL

## 2023-03-30 ENCOUNTER — TRANSFERRED RECORDS (OUTPATIENT)
Dept: HEALTH INFORMATION MANAGEMENT | Facility: CLINIC | Age: 68
End: 2023-03-30
Payer: COMMERCIAL

## 2023-03-30 ENCOUNTER — MEDICAL CORRESPONDENCE (OUTPATIENT)
Dept: HEALTH INFORMATION MANAGEMENT | Facility: CLINIC | Age: 68
End: 2023-03-30
Payer: COMMERCIAL

## 2023-03-31 ENCOUNTER — TELEPHONE (OUTPATIENT)
Dept: INTERNAL MEDICINE | Facility: CLINIC | Age: 68
End: 2023-03-31
Payer: COMMERCIAL

## 2023-03-31 NOTE — TELEPHONE ENCOUNTER
[facility] Call Center    Phone Message    May a detailed message be left on voicemail: yes     Reason for Call: Order(s): Home Care Orders: Occupational Therapy (OT): SHERRIE Magallanes interim, [phone] ok KEVENM OT continue orders: 1xwk for 2wks    Action Taken: Message routed to:  [facility] pcc    Travel Screening: Not Applicable

## 2023-03-31 NOTE — TELEPHONE ENCOUNTER
Called Elpidio OT interim. LVM on confidential line. Verbally confirmed Home Care Orders: Occupational Therapy (OT):  1xwk for 2wks.     Earl SANTIAGO, EMT at 11:01 AM on 3/31/2023.  Primary Care Clinic: [phone]

## 2023-04-03 ENCOUNTER — TELEPHONE (OUTPATIENT)
Dept: GASTROENTEROLOGY | Facility: CLINIC | Age: 68
End: 2023-04-03
Payer: COMMERCIAL

## 2023-04-03 NOTE — TELEPHONE ENCOUNTER
Called to remind patient of their upcoming appointment with our GI clinic, on Tuesday 4/11/2023 at 11:00AM with Dr. Travis Briseno. This appointment is scheduled as a video visit. You will receive a call approximately 30 minutes prior to check you in, you must be in [place] for this visit., if your appointment is virtual (video or telephone) you need to be in [place] for the visit. To reschedule or cancel patient to call [phone].    Radha Coulter MA

## 2023-04-05 ENCOUNTER — DOCUMENTATION ONLY (OUTPATIENT)
Dept: INTERNAL MEDICINE | Facility: CLINIC | Age: 68
End: 2023-04-05
Payer: COMMERCIAL

## 2023-04-05 NOTE — PROGRESS NOTES
Type of Form Received: order    Form Received (Date) 4/5/23   Form Filled out No   Placed in provider folder Yes

## 2023-04-05 NOTE — TELEPHONE ENCOUNTER
Called to remind patient of their upcoming appointment with our GI clinic, on Tuesday 4/11/2023 at 11:00AM with Dr. Travis Briseno. This appointment is scheduled as a video visit. You will receive a call approximately 30 minutes prior to check you in, you must be in [place] for this visit., if your appointment is virtual (video or telephone) you need to be in [place] for the visit. To reschedule or cancel patient to call [phone].    Radha Coulter MA

## 2023-04-06 ENCOUNTER — ALLIED HEALTH/NURSE VISIT (OUTPATIENT)
Dept: EDUCATION SERVICES | Facility: CLINIC | Age: 68
End: 2023-04-06
Payer: COMMERCIAL

## 2023-04-06 DIAGNOSIS — E11.9 TYPE 2 DIABETES MELLITUS WITHOUT COMPLICATION, WITHOUT LONG-TERM CURRENT USE OF INSULIN (H): Primary | ICD-10-CM

## 2023-04-06 PROCEDURE — G0108 DIAB MANAGE TRN  PER INDIV: HCPCS

## 2023-04-06 RX ORDER — PEN NEEDLE, DIABETIC 32GX 5/32"
NEEDLE, DISPOSABLE MISCELLANEOUS
Qty: 100 EACH | Refills: 1 | Status: SHIPPED | OUTPATIENT
Start: 2023-04-06 | End: 2024-01-18

## 2023-04-06 NOTE — PROGRESS NOTES
DIABETES SELF MANAGEMENT EDUCATION:   Jenn Aparicio presents today for education related to Type 2 diabetes.    She is accompanied by self  Referring Provider: Felisha Castro MD    DIABETES RELATED CONCERNS/COMMENTS: neuropathy in hands and feet  Patient's emotional response to diabetes: expresses readiness to learn and concern for health and well-being    Past Diabetes Education: Yes  Support system: family  Living Situation: lives alone, has dog  Occupation: retired    ASSESSMENT:  Patient Problem List and Medication List reviewed for relevant medical history, current medical status, and diabetes risk factors.    Current Diabetes Management per Patient:  Glipizide 5 mg 2 in am and 2 in pm, Jardiance 25 daily--she never increased to the 25 mg dose ordered by lore in January  At risk for hypoglycemia? Yes , Symptoms: Shaky and Confusion and Frequency: none recent  BG meter: Marti 2 sensors   Patient glucose self monitoring as follows: has checked with blood sugars  BG results:         Patient's most recent A1C:   Lab Results   Component Value Date    A1C 9.8 08/31/2022    A1C 8.4 05/20/2022    A1C 8.4 01/27/2022    A1C 8.3 01/11/2022    A1C 7.0 02/24/2021    A1C 6.8 09/08/2020    A1C 7.1 12/01/2015     Nutrition:  Breakfast: (9-10a)  Or skips, tea, skips  Snack:  Lunch:()  Snack:   Dinner: (5-8p) fish and veg, sweet tea  Snack:     Appetite has been going down over last year, started supplementing with Ensure but has stopped using them    Physical Activity:     minimal exercise, using walking    Diabetes Complications:  Numbness in hands or feet--diabetes shoes and socks coming    INTERVENTION:   Education provided today on:  We discussed her CandelarioeVdilshadw reports.  Her blood sugar is still in the 200s in the evening without the Prednisone. She seems to have resigned herself to insulin.  Her eyesight has worsened and feet have been numb.  She knows she needs to get her blood sugar down.    She is going long periods of  time without eating.  She will try to eat more frequently.    We reviewed the insulin teaching that was done last month.   Jenn seemed to understand the material.  She used to give her mother insulin.      Hypoglycemia protocol discussed in the event that she would need to use insulin.    PLAN:  1. Start Lantus 14 units daily    FOLLOW-UP:  5/25/23  Time Spent: 60 minutes  Encounter Type: Individual    Any diabetes medication dose changes were made via the CDE Protocol and Collaborative Practice Agreement with  Physicians.

## 2023-04-10 ENCOUNTER — TELEPHONE (OUTPATIENT)
Dept: INTERNAL MEDICINE | Facility: CLINIC | Age: 68
End: 2023-04-10
Payer: COMMERCIAL

## 2023-04-10 NOTE — TELEPHONE ENCOUNTER
[facility] Call Center    Phone Message    May a detailed message be left on voicemail: yes     Reason for Call: Order(s): Home Care Orders: Other: Speech therapy evaluation due to difficult swallowing Radha wants the PCP know that they do have a therapist on stand by waiting for order to be approved     Action Taken: Message routed to:  [facility] pcp    Travel Screening: Not Applicable

## 2023-04-11 ENCOUNTER — DOCUMENTATION ONLY (OUTPATIENT)
Dept: INTERNAL MEDICINE | Facility: CLINIC | Age: 68
End: 2023-04-11

## 2023-04-11 ENCOUNTER — TELEPHONE (OUTPATIENT)
Dept: INTERNAL MEDICINE | Facility: CLINIC | Age: 68
End: 2023-04-11

## 2023-04-11 NOTE — TELEPHONE ENCOUNTER
[facility] Call [facility]    Phone Message    May a detailed message be left on voicemail: yes     Reason for Call: Other: Elpidio calling from patients home care facility to let the PCP know that the patient know that she is being discharged due to goals being met      Action Taken: Message routed to:  [facility] pcp    Travel Screening: Not Applicable

## 2023-04-11 NOTE — TELEPHONE ENCOUNTER
Called [facility] and gave verbal orders per Mitzi Nettles CNP for Order(s): Home Care Orders: Other: Speech therapy evaluation due to difficult swallowing       Brodie Negron CMA ([facility]) at 8:37 AM on 4/11/2023

## 2023-04-11 NOTE — PROGRESS NOTES
Type of Form Received: order    Form Received (Date) 4/11/23   Form Filled out No   Placed in provider folder Yes

## 2023-04-12 ENCOUNTER — DOCUMENTATION ONLY (OUTPATIENT)
Dept: INTERNAL MEDICINE | Facility: CLINIC | Age: 68
End: 2023-04-12
Payer: COMMERCIAL

## 2023-04-12 NOTE — PROGRESS NOTES
Type of Form Received: order    Form Received (Date) 4/12/23   Form Filled out Yes 4/18/23   Placed in provider folder Yes

## 2023-04-13 ENCOUNTER — DOCUMENTATION ONLY (OUTPATIENT)
Dept: INTERNAL MEDICINE | Facility: CLINIC | Age: 68
End: 2023-04-13
Payer: COMMERCIAL

## 2023-04-13 ENCOUNTER — MEDICAL CORRESPONDENCE (OUTPATIENT)
Dept: HEALTH INFORMATION MANAGEMENT | Facility: CLINIC | Age: 68
End: 2023-04-13
Payer: COMMERCIAL

## 2023-04-13 NOTE — PROGRESS NOTES
Type of Form Received: order    Form Received (Date) 4/13/23   Form Filled out Yes 4/19/23   Placed in provider folder Yes

## 2023-04-14 ENCOUNTER — TELEPHONE (OUTPATIENT)
Dept: INTERNAL MEDICINE | Facility: CLINIC | Age: 68
End: 2023-04-14
Payer: COMMERCIAL

## 2023-04-14 DIAGNOSIS — E11.65 TYPE 2 DIABETES MELLITUS WITH HYPERGLYCEMIA, WITHOUT LONG-TERM CURRENT USE OF INSULIN (H): Primary | ICD-10-CM

## 2023-04-14 DIAGNOSIS — E11.9 TYPE 2 DIABETES MELLITUS WITHOUT COMPLICATION, WITHOUT LONG-TERM CURRENT USE OF INSULIN (H): ICD-10-CM

## 2023-04-14 RX ORDER — INSULIN GLARGINE 100 [IU]/ML
INJECTION, SOLUTION SUBCUTANEOUS
Qty: 15 ML | Refills: 3 | Status: SHIPPED | OUTPATIENT
Start: 2023-04-14 | End: 2023-05-05

## 2023-04-14 NOTE — TELEPHONE ENCOUNTER
[facility] Call Center    Phone Message    May a detailed message be left on voicemail: yes     Reason for Call: Medication Refill Request    Has the patient contacted the pharmacy for the refill? Yes   Name of medication being requested:    insulin glargine (LANTUS PEN) 100 UNIT/ML pen     Provider who prescribed the medication: Mitzi Nettles  Pharmacy:  [facility] - [place], [place] - [address] [place] [address]    Date medication is needed: asap         Action Taken: Message routed to:  [facility] pcc    Travel Screening: Not Applicable

## 2023-04-14 NOTE — TELEPHONE ENCOUNTER
"Last clinic visit notes 01/23/2023 with Dr Shabazz:  \"Insulin, recalls developing hypoglycemia prompting discontinuation\".   Medication not listed in Assessment/Plan.     CDEs notified: No plan attached to 04/06/2023 visit with Nithya Narvaez.    Return visit with Dr Shabazz 04/24/2023.     CDEs notified to please advise.   Luanne Yanez RN on 4/14/2023 at 9:58 AM              insulin glargine (LANTUS PEN) 100 UNIT/ML pen  : Inject 14 Units Subcutaneous daily - Subcutaneous  Last Written Prescription Date:  4/6/23  Last Fill Quantity: 15 ml ,   # refills: 1  Sent to: [id number] [place], [place] - [address] AT [facility] [address]  Last Office Visit : 1/23/23 Mele  Future Office visit:  4/24/23 Mele    Routing refill request to provider for review/approval because:  Insulin - refilled per clinic     Pharmacy:  [facility] - [facility], [place] - [address] [place] [address]            "

## 2023-04-17 ENCOUNTER — LAB (OUTPATIENT)
Dept: LAB | Facility: CLINIC | Age: 68
End: 2023-04-17
Payer: COMMERCIAL

## 2023-04-17 ENCOUNTER — DOCUMENTATION ONLY (OUTPATIENT)
Dept: INTERNAL MEDICINE | Facility: CLINIC | Age: 68
End: 2023-04-17

## 2023-04-17 DIAGNOSIS — E11.65 TYPE 2 DIABETES MELLITUS WITH HYPERGLYCEMIA, WITHOUT LONG-TERM CURRENT USE OF INSULIN (H): ICD-10-CM

## 2023-04-17 LAB
ALBUMIN SERPL BCG-MCNC: 4.1 G/DL (ref 3.5–5.2)
ALP SERPL-CCNC: 89 U/L (ref 35–104)
ALT SERPL W P-5'-P-CCNC: 10 U/L (ref 10–35)
ANION GAP SERPL CALCULATED.3IONS-SCNC: 4 MMOL/L (ref 7–15)
AST SERPL W P-5'-P-CCNC: 14 U/L (ref 10–35)
BILIRUB SERPL-MCNC: 0.3 MG/DL
BUN SERPL-MCNC: 7.3 MG/DL (ref 8–23)
CALCIUM SERPL-MCNC: 9.6 MG/DL (ref 8.8–10.2)
CHLORIDE SERPL-SCNC: 105 MMOL/L (ref 98–107)
CREAT SERPL-MCNC: 0.55 MG/DL (ref 0.51–0.95)
CREAT UR-MCNC: 59.7 MG/DL
DEPRECATED HCO3 PLAS-SCNC: 36 MMOL/L (ref 22–29)
GFR SERPL CREATININE-BSD FRML MDRD: >90 ML/MIN/1.73M2
GLUCOSE SERPL-MCNC: 165 MG/DL (ref 70–99)
HBA1C MFR BLD: 10.5 %
MICROALBUMIN UR-MCNC: 38.1 MG/L
MICROALBUMIN/CREAT UR: 63.82 MG/G CR (ref 0–25)
POTASSIUM SERPL-SCNC: 4.9 MMOL/L (ref 3.4–5.3)
PROT SERPL-MCNC: 7.1 G/DL (ref 6.4–8.3)
SODIUM SERPL-SCNC: 145 MMOL/L (ref 136–145)
VIT B12 SERPL-MCNC: 244 PG/ML (ref 232–1245)

## 2023-04-17 PROCEDURE — 36415 COLL VENOUS BLD VENIPUNCTURE: CPT | Performed by: PATHOLOGY

## 2023-04-17 PROCEDURE — 99000 SPECIMEN HANDLING OFFICE-LAB: CPT | Performed by: PATHOLOGY

## 2023-04-17 PROCEDURE — 82043 UR ALBUMIN QUANTITATIVE: CPT | Mod: 90 | Performed by: PATHOLOGY

## 2023-04-17 PROCEDURE — 82607 VITAMIN B-12: CPT | Mod: 90 | Performed by: PATHOLOGY

## 2023-04-17 PROCEDURE — 83036 HEMOGLOBIN GLYCOSYLATED A1C: CPT | Mod: 90 | Performed by: PATHOLOGY

## 2023-04-17 PROCEDURE — 82570 ASSAY OF URINE CREATININE: CPT | Mod: 90 | Performed by: PATHOLOGY

## 2023-04-17 PROCEDURE — 80053 COMPREHEN METABOLIC PANEL: CPT | Performed by: PATHOLOGY

## 2023-04-17 NOTE — PROGRESS NOTES
Type of Form Received: 76234    Form Received (Date) 4/17/23   Form Filled out Yes 4/19/23   Placed in provider folder Yes

## 2023-04-18 ENCOUNTER — MEDICAL CORRESPONDENCE (OUTPATIENT)
Dept: HEALTH INFORMATION MANAGEMENT | Facility: CLINIC | Age: 68
End: 2023-04-18
Payer: COMMERCIAL

## 2023-04-18 DIAGNOSIS — E11.9 TYPE 2 DIABETES MELLITUS WITHOUT COMPLICATION, WITHOUT LONG-TERM CURRENT USE OF INSULIN (H): ICD-10-CM

## 2023-04-19 ENCOUNTER — DOCUMENTATION ONLY (OUTPATIENT)
Dept: INTERNAL MEDICINE | Facility: CLINIC | Age: 68
End: 2023-04-19
Payer: COMMERCIAL

## 2023-04-19 NOTE — PROGRESS NOTES
Type of Form Received:     Form Received (Date) 4/19/23   Form Filled out No   Placed in provider folder Yes

## 2023-04-20 NOTE — TELEPHONE ENCOUNTER
GLUCOSAMINE-CHONDROITIN -400 MG  Last Written Prescription Date:  6/1/22  Last Fill Quantity: 90,   # refills: 3  Last Office Visit : 3/16/23  Future Office visit:  7/18/23  Routing refill request to provider for review/approval because:  Drug not on the refill protocol

## 2023-04-21 ENCOUNTER — OFFICE VISIT (OUTPATIENT)
Dept: PULMONOLOGY | Facility: CLINIC | Age: 68
End: 2023-04-21
Attending: INTERNAL MEDICINE
Payer: COMMERCIAL

## 2023-04-21 VITALS
HEART RATE: 94 BPM | OXYGEN SATURATION: 92 % | BODY MASS INDEX: 31.82 KG/M2 | SYSTOLIC BLOOD PRESSURE: 138 MMHG | HEIGHT: 66 IN | WEIGHT: 198 LBS | DIASTOLIC BLOOD PRESSURE: 75 MMHG

## 2023-04-21 DIAGNOSIS — R06.00 PND (PAROXYSMAL NOCTURNAL DYSPNEA): ICD-10-CM

## 2023-04-21 DIAGNOSIS — J44.9 CHRONIC OBSTRUCTIVE PULMONARY DISEASE, UNSPECIFIED COPD TYPE (H): Primary | ICD-10-CM

## 2023-04-21 PROCEDURE — 94375 RESPIRATORY FLOW VOLUME LOOP: CPT | Performed by: INTERNAL MEDICINE

## 2023-04-21 PROCEDURE — 99214 OFFICE O/P EST MOD 30 MIN: CPT | Mod: 25 | Performed by: STUDENT IN AN ORGANIZED HEALTH CARE EDUCATION/TRAINING PROGRAM

## 2023-04-21 PROCEDURE — G0463 HOSPITAL OUTPT CLINIC VISIT: HCPCS | Performed by: STUDENT IN AN ORGANIZED HEALTH CARE EDUCATION/TRAINING PROGRAM

## 2023-04-21 PROCEDURE — 94729 DIFFUSING CAPACITY: CPT | Performed by: INTERNAL MEDICINE

## 2023-04-21 RX ORDER — ROFLUMILAST 250 UG/1
TABLET ORAL
Qty: 708 TABLET | Refills: 0 | Status: SHIPPED | OUTPATIENT
Start: 2023-04-21 | End: 2023-08-09

## 2023-04-21 RX ORDER — PREDNISONE 20 MG/1
40 TABLET ORAL DAILY
Qty: 10 TABLET | Refills: 3 | Status: SHIPPED | OUTPATIENT
Start: 2023-04-21 | End: 2023-10-06

## 2023-04-21 RX ORDER — DOXYCYCLINE HYCLATE 100 MG
100 TABLET ORAL 2 TIMES DAILY
Qty: 10 TABLET | Refills: 1 | Status: SHIPPED | OUTPATIENT
Start: 2023-04-21 | End: 2023-10-06

## 2023-04-21 ASSESSMENT — PAIN SCALES - GENERAL: PAINLEVEL: NO PAIN (0)

## 2023-04-21 NOTE — NURSING NOTE
Chief Complaint   Patient presents with     Follow Up     COPD Follow up      Vitals were taken and medications were reconciled.     Eloise Duarte RMA  3:58 PM

## 2023-04-21 NOTE — LETTER
4/21/2023         RE: Jenn Aparicio  [address]  [place] [address]        Dear Colleague,    Thank you for referring your patient, Jenn Aparicio, to the [facility] FOR LUNG SCIENCE AND [facility] [place]. Please see a copy of my visit note below.    [facility]   Pulmonary Clinic  Consult Note 8/10/2022  Jenn Aparicio MRN: [id number]      Assessment & Plan      Jenn Aparicio is a 67 year old female with very severe COPD (FEV1 0.55, Z-4.42 4/2023), RLL adeno s/p lobectomy (2016), RUL NSCLC s/p SBRT (1/2020), ongoing tobacco use, DM2, and HTN who initially established with pulmonary clinic 12/2020 after transferring her oncology care to [facility].  She was last seen 8/2022.  Since that time, she was hospitalized 1/1-1/4/2023, 2/3-2/9/2023, and 2/20-2/24/2023 for severe COPD exacerbations without any overt triggers.    #Severe COPD (FEV1 0.55, Z-4.42 4/2023)  #Chronic hypoxemic hypercapnic respiratory failure (4L O2, pVCO2 55-60 baseline)  Currently with increasing symptoms in the setting of not having nebbed yet today, possibly brewing infection.  Will get CXR today to evaluate for pneumonia or effusion or (less likely) pneumothorax.  CO2 notably more elevated on Monday's labs compared to labs at discharge.  Provided patient with prednisone and doxycycline to  on the way out to have on hand this weekend if feeling worse or not improving.  Recommended increasing nebs to 4 times daily in the meantime.  Continuing vest therapy, mucomyst.  Did not tolerate azithromycin, so will start roflumilast.  May need chronic prednisone to help manage exacerbations.    #RLL adenocarcinoma s/p lobectomy (2/2016)  #RUL NSCLC s/p SBRT (1/2020)  Oncology note from 3/2023 reviewed.  Plan for follow up imaging to monitor enlarging nodules.  Only therapy that would be offered would be systemic, if needed.    Recommendations:    - CXR today   - Continue Trelegy (LAMA/LABA/ICS)   - Secretion  "management:    - Vest therapy BID    - Mucomyst neb BID   - Duonebs - increase from TID to QID   - Nightly NIPPV   - Start roflumilast 250 mg daily x1 month followed by 500 mg daily ongoing   - May need addition of chronic prednisone to help manage exacerbations   - COPD home action plan renewed: prednisone + doxycycline on hand   - RTC 3 months    Patient seen & discussed w/  Dr. Soriano, who agrees with the above assessment and plan.    Kailee Moralez M.D.  Pulmonary and Critical Care Medicine Fellow  04/21/2023           Interval History / Subjective:   Jenn Aparicio is a 67 year old female with severe COPD (FEV1 0.55, Z-4.42 4/2023), RLL adeno s/p lobectomy (2016), RUL NSCLC s/p SBRT (1/2020), ongoing tobacco use, DM2, and HTN who was established with pulmonary clinic 12/2020 after transferring her oncology care to [facility].  She was last seen 8/2022.  Since that time, she was hospitalized 1/1-1/4/2023, 2/3-2/9/2023, and 2/20-2/24/2023 for severe COPD exacerbations without any overt triggers.  She returns today for what should have been a 3-month follow up.    Throughout the hospitalizations, she was prescribed chronic azithromycin, vest therapy, mucomyst nebs, and given albuterol and Duonebs as well.  She has received home NIPPV as well.    She states that she had to stop the azithromycin due to dizziness/lightheadedness/seeing stars.  The symptoms resolved after stopping it.    She denies recent fevers, but endorses increased nightsweats.  She has had 3 days of PND without significant lower extremity edema.  Some right-sided chest tightness with coughing.  She is wearing her NIPPV nightly and typically tolerating it well except for the past 3 days with the PND.    She last finished prednisone about 1-2 weeks ago.  Mucus has gradually increased throughout that time.  She is currently \"behind\" on her nebs since she has been at the clinic today.          Social/Occupational/Exposures:     Patient is a off-and-on " smoker w/ significant second hand exposure in her apartment building.  No known asbestos exposure.  No pets.  No bird exposure.  Significant mold reported in her apartment building.    Social History     Tobacco Use    Smoking status: Every Day     Packs/day: 0.25     Types: Cigarettes    Smokeless tobacco: Former    Tobacco comments:     01/02/2023 Patient using 14 mg patch, wants to have prescription for Nicotrol inhaler, took workbook   Substance Use Topics    Alcohol use: Yes     Comment: sometime    Drug use: No             Review of Symptoms:   10-point ROS reviewed, & found negative w/ exceptions noted in the HPI.          Past Medical History:     Past Medical History:   Diagnosis Date    Adenocarcinoma, lung (H)     Asthma     Ectopic pregnancy     Esophageal reflux     Pulmonary emphysema (H)     Very severe FEV1<30% predicted    Type II diabetes mellitus (H)        Past Surgical History:   Procedure Laterality Date    2/8/16                R thoracotomy, RLL lobectomy (Dr. Cunha). Adenocarcinoma, 1.1 cm, assoicated with atypical adenomatous hyperplasia Right 02/08/2016    R thoracotomy, RLL lobectomy (Dr. Cunha). Adenocarcinoma, 1.1 cm, assoicated with atypical adenomatous hyperplasia    ESOPHAGOSCOPY, GASTROSCOPY, DUODENOSCOPY (EGD), COMBINED N/A 8/16/2022    Procedure: ESOPHAGOGASTRODUODENOSCOPY (EGD);  Surgeon: Travis Briseno MD;  Location:  GI    ORTHOPEDIC SURGERY              Allergies:     Allergies   Allergen Reactions    Interferons Dermatitis    Penicillins Hives    Aspirin      325mg     Colon Care              Outpatient Medications:     acetylcysteine (MUCOMYST) 10 % nebulizer solution, Inhale 4 mLs into the lungs 4 times daily  albuterol (PROAIR HFA/PROVENTIL HFA/VENTOLIN HFA) 108 (90 Base) MCG/ACT inhaler, USE 1 OR 2 INHALATIONS EVERY 4 HOURS AS NEEDED *Keep appt on 3/16/23 for further refills*  albuterol (PROVENTIL) (2.5 MG/3ML) 0.083% neb solution, Take 1 vial (2.5 mg) by  nebulization 4 times daily  alpha-lipoic acid 100 MG capsule, Take 200 mg by mouth daily as needed  amLODIPine (NORVASC) 10 MG tablet, Take 1 tablet (10 mg) by mouth daily  aspirin 81 MG EC tablet, Take 1 tablet (81 mg) by mouth daily  buPROPion (WELLBUTRIN XL) 300 MG 24 hr tablet, Take 1 tablet (300 mg) by mouth every morning  cetirizine (ZYRTEC) 10 MG tablet, Take 1 tablet (10 mg) by mouth daily  Continuous Blood Gluc  (FREESTYLE GIOVANNI 2 READER) JOSEPHINE, 1 each daily For continuous glucose monitoring  Continuous Blood Gluc Sensor (FREESTYLE GIOVANNI 14 DAY SENSOR) [facility], Change every 14 days.  Continuous Blood Gluc Sensor (FREESTYLE GIOVANNI 2 SENSOR) [facility], 1 each every 14 days For use with Freestyle Giovanni 2  for continuous monitioring of blood glucose levels. Replace sensor every 14 days.  Easy Comfort Lancets MISC,   empagliflozin (JARDIANCE) 25 MG TABS tablet, Take 1 tablet (25 mg) by mouth daily  fluticasone (FLONASE) 50 MCG/ACT nasal spray, Spray 1 spray into both nostrils daily Use at night before bed  Fluticasone-Umeclidin-Vilanterol (TRELEGY ELLIPTA) 200-62.5-25 MCG/INH oral inhaler, Inhale 1 puff into the lungs daily  glipiZIDE (GLUCOTROL) 5 MG tablet, Take 2 tablets (10mg) in the AM and 2 tablet (5mg) at PM daily.  GLUCOSAMINE-CHONDROITIN -400 MG tablet, TAKE 1 TABLET DAILY  insulin glargine (LANTUS PEN) 100 UNIT/ML pen, Inject 14 Units Subcutaneous At Bedtime  insulin glargine (LANTUS PEN) 100 UNIT/ML pen, Inject 14 Units Subcutaneous daily  insulin glargine (LANTUS SOLOSTAR) 100 UNIT/ML pen, Inject 14 Units Subcutaneous daily, Disp  insulin pen needle (BD MARCIO U/F) 32G X 4 MM miscellaneous, Use 1 pen needle daily or as directed.  ipratropium - albuterol 0.5 mg/2.5 mg/3 mL (DUONEB) 0.5-2.5 (3) MG/3ML neb solution, Take 1 vial by nebulization every 6 hours as needed for shortness of breath, wheezing or cough  ketotifen (ZADITOR) 0.025 % ophthalmic solution, Place 1 drop into both eyes  "daily  Lancet Devices (EASY MINI EJECT LANCING DEVICE) MISC,   Misc. Devices (PULSE OXIMETER FOR FINGER) MISC, 1 Device as needed (copd)  multivitamin w/minerals (THERA-VIT-M) tablet, Take 1 tablet by mouth daily  nicotine (NICORETTE) 4 MG lozenge, Place 1 lozenge (4 mg) inside cheek every hour as needed for smoking cessation  nicotine (NICOTROL) 10 MG inhaler, Use 1 cartridge as needed for urge to smoke by puffing over course of 20min.  Use 6-16 cart/day; reduce number of cart/day over 6-12 weeks.  omega-3 acid ethyl esters (LOVAZA) 1 g capsule, Take 2 capsules (2 g) by mouth 2 times daily Please call [phone] to schedule follow up visit for more refills.  omeprazole (PRILOSEC) 20 MG DR capsule, Take 1 capsule (20 mg) by mouth 2 times daily  polyethylene glycol-propylene glycol (SYSTANE) 0.4-0.3 % SOLN ophthalmic solution, Place 1 drop into both eyes 4 times daily  predniSONE (DELTASONE) 20 MG tablet, Take 0.5 tablets (10 mg) by mouth daily  pregabalin (LYRICA) 150 MG capsule, Take 1 capsule (150 mg) by mouth 2 times daily  propylene glycol (SYSTANE BALANCE) 0.6 % SOLN ophthalmic solution,   STATIN NOT PRESCRIBED (INTENTIONAL), Please choose reason not prescribed from choices below.    No current facility-administered medications on file prior to visit.            Family History:     Family History   Problem Relation Age of Onset    Lung Cancer Sister     Depression Daughter     Alcohol/Drug Other         self    Diabetes Other         self    Thyroid Disease Other         self    Asthma Other         self               Physical Exam:   /75 (BP Location: Right arm, Patient Position: Sitting)   Pulse 94   Ht 1.676 m (5' 6\")   Wt 89.8 kg (198 lb)   SpO2 92%   BMI 31.96 kg/m      General: middle aged female, appears tired, using a walker  HENT: external ears without visible abnormalities, no rhinorrhea, no epistaxis  Lungs: decent air movement in all lung fields, no wheezing, on NC 0-3L during " appointment, no accessory muscle use  Heart: RRR, no murmurs  Extremities: trace bilateral pitting edema, bilateral clubbing, no cyanosis  Skin: no visible rashes, no mottling, scattered ecchymoses in various stages of healing  Neurologic: moving all 4 extremities spontaneously, awake and alert  Psych: full affect, appropriate insight, appropriate judgment          Data:   Labs: notable labs in HPI above.    CO2 (4/17/23): 36  Alpha-1 Antitrypsin (2/22/2023): 149, normal M1M1 phenotype    Imaging and other diagnostic testing (all imaging studies reviewed by me)    CT chest 3/22/2023: RUL nodules stable compared to 3/1/2023; ROBERT mucus plugging    PFTs 12/2020:        PFTs 4/2023: FEV1 0.55 (-4.42), FVC 1.36 (-3.64), 0.40 -> severe obstruction, stable compared to 12/2020    6MWT 8/10/2022: 540 feet, 97% -> 90% on room air (previous walk in 12/2020 for 480 feet)    Transthoracic echocardiogram (9/2022): EF 55-60%, normal RV, cannot assess PASP, normal RA pressure    Attestation signed by Rodolfo Soriano MD at 4/21/2023  5:02 PM:  The patient was seen examined by me with the pulmonary fellow/resident, I have personally reviewed the imaging studies, lab and culture results.  The note reflects our joint findings, assessment and plan on 04/21/23.     Rodolfo Soriano MD  Pulmonary & Critical Care   [phone] (Text Page)

## 2023-04-21 NOTE — PATIENT INSTRUCTIONS
downstairs:  - Start if you don't feel better with nebs: Prednisone 40 mg for 5 days and Doxycycline 100 mg in the morning and evening for 5 days  - Stop and get chest x-ray    Mail order:  - Roflumlast - this medicine helps prevent exacerbations and hospital    Kailee Moralez MD  Pulmonary / Critical Care Fellow

## 2023-04-21 NOTE — PROGRESS NOTES
[facility]   Pulmonary Clinic  Consult Note 8/10/2022  Jenn Aparicio MRN: [id number]      Assessment & Plan      Jenn Aparicio is a 67 year old female with very severe COPD (FEV1 0.55, Z-4.42 4/2023), RLL adeno s/p lobectomy (2016), RUL NSCLC s/p SBRT (1/2020), ongoing tobacco use, DM2, and HTN who initially established with pulmonary clinic 12/2020 after transferring her oncology care to [facility].  She was last seen 8/2022.  Since that time, she was hospitalized 1/1-1/4/2023, 2/3-2/9/2023, and 2/20-2/24/2023 for severe COPD exacerbations without any overt triggers.    #Severe COPD (FEV1 0.55, Z-4.42 4/2023)  #Chronic hypoxemic hypercapnic respiratory failure (4L O2, pVCO2 55-60 baseline)  Currently with increasing symptoms in the setting of not having nebbed yet today, possibly brewing infection.  Will get CXR today to evaluate for pneumonia or effusion or (less likely) pneumothorax.  CO2 notably more elevated on Monday's labs compared to labs at discharge.  Provided patient with prednisone and doxycycline to  on the way out to have on hand this weekend if feeling worse or not improving.  Recommended increasing nebs to 4 times daily in the meantime.  Continuing vest therapy, mucomyst.  Did not tolerate azithromycin, so will start roflumilast.  May need chronic prednisone to help manage exacerbations.    #RLL adenocarcinoma s/p lobectomy (2/2016)  #RUL NSCLC s/p SBRT (1/2020)  Oncology note from 3/2023 reviewed.  Plan for follow up imaging to monitor enlarging nodules.  Only therapy that would be offered would be systemic, if needed.    Recommendations:    - CXR today   - Continue Trelegy (LAMA/LABA/ICS)   - Secretion management:    - Vest therapy BID    - Mucomyst neb BID   - Duonebs - increase from TID to QID   - Nightly NIPPV   - Start roflumilast 250 mg daily x1 month followed by 500 mg daily ongoing   - May need addition of chronic prednisone to help manage exacerbations   - COPD home action plan  "renewed: prednisone + doxycycline on hand   - RTC 3 months    Patient seen & discussed w/  Dr. Soriano, who agrees with the above assessment and plan.    Kailee Moralez M.D.  Pulmonary and Critical Care Medicine Fellow  04/21/2023           Interval History / Subjective:   Jenn Aparicio is a 67 year old female with severe COPD (FEV1 0.55, Z-4.42 4/2023), RLL adeno s/p lobectomy (2016), RUL NSCLC s/p SBRT (1/2020), ongoing tobacco use, DM2, and HTN who was established with pulmonary clinic 12/2020 after transferring her oncology care to [facility].  She was last seen 8/2022.  Since that time, she was hospitalized 1/1-1/4/2023, 2/3-2/9/2023, and 2/20-2/24/2023 for severe COPD exacerbations without any overt triggers.  She returns today for what should have been a 3-month follow up.    Throughout the hospitalizations, she was prescribed chronic azithromycin, vest therapy, mucomyst nebs, and given albuterol and Duonebs as well.  She has received home NIPPV as well.    She states that she had to stop the azithromycin due to dizziness/lightheadedness/seeing stars.  The symptoms resolved after stopping it.    She denies recent fevers, but endorses increased nightsweats.  She has had 3 days of PND without significant lower extremity edema.  Some right-sided chest tightness with coughing.  She is wearing her NIPPV nightly and typically tolerating it well except for the past 3 days with the PND.    She last finished prednisone about 1-2 weeks ago.  Mucus has gradually increased throughout that time.  She is currently \"behind\" on her nebs since she has been at the clinic today.          Social/Occupational/Exposures:     Patient is a off-and-on smoker w/ significant second hand exposure in her apartment building.  No known asbestos exposure.  No pets.  No bird exposure.  Significant mold reported in her apartment building.    Social History     Tobacco Use     Smoking status: Every Day     Packs/day: 0.25     Types: Cigarettes "     Smokeless tobacco: Former     Tobacco comments:     01/02/2023 Patient using 14 mg patch, wants to have prescription for Nicotrol inhaler, took workbook   Substance Use Topics     Alcohol use: Yes     Comment: sometime     Drug use: No             Review of Symptoms:   10-point ROS reviewed, & found negative w/ exceptions noted in the HPI.          Past Medical History:     Past Medical History:   Diagnosis Date     Adenocarcinoma, lung (H)      Asthma      Ectopic pregnancy      Esophageal reflux      Pulmonary emphysema (H)     Very severe FEV1<30% predicted     Type II diabetes mellitus (H)        Past Surgical History:   Procedure Laterality Date     2/8/16                R thoracotomy, RLL lobectomy (Dr. Cunha). Adenocarcinoma, 1.1 cm, assoicated with atypical adenomatous hyperplasia Right 02/08/2016    R thoracotomy, RLL lobectomy (Dr. Cunha). Adenocarcinoma, 1.1 cm, assoicated with atypical adenomatous hyperplasia     ESOPHAGOSCOPY, GASTROSCOPY, DUODENOSCOPY (EGD), COMBINED N/A 8/16/2022    Procedure: ESOPHAGOGASTRODUODENOSCOPY (EGD);  Surgeon: Travis Briseno MD;  Location:  GI     ORTHOPEDIC SURGERY              Allergies:     Allergies   Allergen Reactions     Interferons Dermatitis     Penicillins Hives     Aspirin      325mg      Colon Care              Outpatient Medications:     acetylcysteine (MUCOMYST) 10 % nebulizer solution, Inhale 4 mLs into the lungs 4 times daily  albuterol (PROAIR HFA/PROVENTIL HFA/VENTOLIN HFA) 108 (90 Base) MCG/ACT inhaler, USE 1 OR 2 INHALATIONS EVERY 4 HOURS AS NEEDED *Keep appt on 3/16/23 for further refills*  albuterol (PROVENTIL) (2.5 MG/3ML) 0.083% neb solution, Take 1 vial (2.5 mg) by nebulization 4 times daily  alpha-lipoic acid 100 MG capsule, Take 200 mg by mouth daily as needed  amLODIPine (NORVASC) 10 MG tablet, Take 1 tablet (10 mg) by mouth daily  aspirin 81 MG EC tablet, Take 1 tablet (81 mg) by mouth daily  buPROPion (WELLBUTRIN XL) 300 MG 24 hr  tablet, Take 1 tablet (300 mg) by mouth every morning  cetirizine (ZYRTEC) 10 MG tablet, Take 1 tablet (10 mg) by mouth daily  Continuous Blood Gluc  (FREESTYLE GIOVANNI 2 READER) JOSEPHINE, 1 each daily For continuous glucose monitoring  Continuous Blood Gluc Sensor (FREESTYLE GIOVANNI 14 DAY SENSOR) MISC, Change every 14 days.  Continuous Blood Gluc Sensor (FREESTYLE GIOVANNI 2 SENSOR) MISC, 1 each every 14 days For use with Freestyle Giovanni 2  for continuous monitioring of blood glucose levels. Replace sensor every 14 days.  Easy Comfort Lancets MISC,   empagliflozin (JARDIANCE) 25 MG TABS tablet, Take 1 tablet (25 mg) by mouth daily  fluticasone (FLONASE) 50 MCG/ACT nasal spray, Spray 1 spray into both nostrils daily Use at night before bed  Fluticasone-Umeclidin-Vilanterol (TRELEGY ELLIPTA) 200-62.5-25 MCG/INH oral inhaler, Inhale 1 puff into the lungs daily  glipiZIDE (GLUCOTROL) 5 MG tablet, Take 2 tablets (10mg) in the AM and 2 tablet (5mg) at PM daily.  GLUCOSAMINE-CHONDROITIN -400 MG tablet, TAKE 1 TABLET DAILY  insulin glargine (LANTUS PEN) 100 UNIT/ML pen, Inject 14 Units Subcutaneous At Bedtime  insulin glargine (LANTUS PEN) 100 UNIT/ML pen, Inject 14 Units Subcutaneous daily  insulin glargine (LANTUS SOLOSTAR) 100 UNIT/ML pen, Inject 14 Units Subcutaneous daily, Disp  insulin pen needle (BD MARCIO U/F) 32G X 4 MM miscellaneous, Use 1 pen needle daily or as directed.  ipratropium - albuterol 0.5 mg/2.5 mg/3 mL (DUONEB) 0.5-2.5 (3) MG/3ML neb solution, Take 1 vial by nebulization every 6 hours as needed for shortness of breath, wheezing or cough  ketotifen (ZADITOR) 0.025 % ophthalmic solution, Place 1 drop into both eyes daily  Lancet Devices (EASY MINI EJECT LANCING DEVICE) MISC,   Misc. Devices (PULSE OXIMETER FOR FINGER) MISC, 1 Device as needed (copd)  multivitamin w/minerals (THERA-VIT-M) tablet, Take 1 tablet by mouth daily  nicotine (NICORETTE) 4 MG lozenge, Place 1 lozenge (4 mg) inside  "cheek every hour as needed for smoking cessation  nicotine (NICOTROL) 10 MG inhaler, Use 1 cartridge as needed for urge to smoke by puffing over course of 20min.  Use 6-16 cart/day; reduce number of cart/day over 6-12 weeks.  omega-3 acid ethyl esters (LOVAZA) 1 g capsule, Take 2 capsules (2 g) by mouth 2 times daily Please call [phone] to schedule follow up visit for more refills.  omeprazole (PRILOSEC) 20 MG DR capsule, Take 1 capsule (20 mg) by mouth 2 times daily  polyethylene glycol-propylene glycol (SYSTANE) 0.4-0.3 % SOLN ophthalmic solution, Place 1 drop into both eyes 4 times daily  predniSONE (DELTASONE) 20 MG tablet, Take 0.5 tablets (10 mg) by mouth daily  pregabalin (LYRICA) 150 MG capsule, Take 1 capsule (150 mg) by mouth 2 times daily  propylene glycol (SYSTANE BALANCE) 0.6 % SOLN ophthalmic solution,   STATIN NOT PRESCRIBED (INTENTIONAL), Please choose reason not prescribed from choices below.    No current facility-administered medications on file prior to visit.            Family History:     Family History   Problem Relation Age of Onset     Lung Cancer Sister      Depression Daughter      Alcohol/Drug Other         self     Diabetes Other         self     Thyroid Disease Other         self     Asthma Other         self               Physical Exam:   /75 (BP Location: Right arm, Patient Position: Sitting)   Pulse 94   Ht 1.676 m (5' 6\")   Wt 89.8 kg (198 lb)   SpO2 92%   BMI 31.96 kg/m      General: middle aged female, appears tired, using a walker  HENT: external ears without visible abnormalities, no rhinorrhea, no epistaxis  Lungs: decent air movement in all lung fields, no wheezing, on NC 0-3L during appointment, no accessory muscle use  Heart: RRR, no murmurs  Extremities: trace bilateral pitting edema, bilateral clubbing, no cyanosis  Skin: no visible rashes, no mottling, scattered ecchymoses in various stages of healing  Neurologic: moving all 4 extremities spontaneously, " awake and alert  Psych: full affect, appropriate insight, appropriate judgment          Data:   Labs: notable labs in HPI above.    CO2 (4/17/23): 36  Alpha-1 Antitrypsin (2/22/2023): 149, normal M1M1 phenotype    Imaging and other diagnostic testing (all imaging studies reviewed by me)    CT chest 3/22/2023: RUL nodules stable compared to 3/1/2023; ROBERT mucus plugging    PFTs 12/2020:        PFTs 4/2023: FEV1 0.55 (-4.42), FVC 1.36 (-3.64), 0.40 -> severe obstruction, stable compared to 12/2020    6MWT 8/10/2022: 540 feet, 97% -> 90% on room air (previous walk in 12/2020 for 480 feet)    Transthoracic echocardiogram (9/2022): EF 55-60%, normal RV, cannot assess PASP, normal RA pressure

## 2023-04-24 ENCOUNTER — VIRTUAL VISIT (OUTPATIENT)
Dept: ENDOCRINOLOGY | Facility: CLINIC | Age: 68
End: 2023-04-24
Payer: COMMERCIAL

## 2023-04-24 DIAGNOSIS — E11.65 TYPE 2 DIABETES MELLITUS WITH HYPERGLYCEMIA, WITHOUT LONG-TERM CURRENT USE OF INSULIN (H): Primary | ICD-10-CM

## 2023-04-24 DIAGNOSIS — E53.8 VITAMIN B12 DEFICIENCY (NON ANEMIC): ICD-10-CM

## 2023-04-24 DIAGNOSIS — E11.9 TYPE 2 DIABETES MELLITUS WITHOUT COMPLICATION, WITHOUT LONG-TERM CURRENT USE OF INSULIN (H): ICD-10-CM

## 2023-04-24 PROCEDURE — 99215 OFFICE O/P EST HI 40 MIN: CPT | Mod: VID | Performed by: INTERNAL MEDICINE

## 2023-04-24 RX ORDER — UREA 10 %
500 LOTION (ML) TOPICAL DAILY
Qty: 90 TABLET | Refills: 1 | Status: SHIPPED | OUTPATIENT
Start: 2023-04-24 | End: 2024-07-09

## 2023-04-24 NOTE — PATIENT INSTRUCTIONS
-Reduce Lantus to 10 units every morning  -For now continue Jardiance and glipizide  -I will send prescription for Ozempic to [facility] pharmacy: If medication is received, please refrigerate and bring 1 pen to next visit with Nithya Narvaez on 5/25/2023 for follow-up on blood glucose and injection teaching--we will start with 0.25 mg injected weekly for 4 weeks and if tolerating, increase to 0.5 mg weekly.  -When we start Ozempic, we may discontinue glipizide  -Start vitamin B12 500 mcg daily (sent to pharmacy)  -Return for a follow-up visit in [facility], with labs before visit  -We will communicate results by letter, or if needed by phone

## 2023-04-24 NOTE — NURSING NOTE
Blood sugars from Marti:    4/20/23:  152, 111, 69, 287, 249  4/21/23:  120, 116, 243, 69, 253, 268, 247, 270  4/22/23:  75, 88, 238, 249, 156  4/23/23:  240, 223, 102, 91, 182, 172  4/24/23:  69

## 2023-04-24 NOTE — LETTER
4/24/2023       RE: Jenn Aparicio  [address]  [place] [address]     Dear Colleague,    Thank you for referring your patient, Jenn Aparicio, to the [facility] ENDOCRINOLOGY [facility] [place] at [facility]. Please see a copy of my visit note below.      ENDOCRINOLOGY FOLLOW-UP        HISTORY OF PRESENT ILLNESS    Jenn Aparicio is seen in follow-up--we attempted video visit but due to technical issues transition to a telephone visit.    Difficulty with follow-up in the interim since initial visit.  Hospitalizations since we saw each other in 1/2023: Admitted in February twice with severe COPD exacerbation.    Most recently seen in pulmonology on 4/21/2023: Prescribed prednisone and doxycycline for suspected COPD exacerbation.  Her pulmonary team suspected there may be long-term need for prednisone to manage exacerbations. Has not needed to start prednisone yet.    Has seen diabetes care and  in the interim since last visit: Due to escalating hyperglycemia, we initiated insulin on 4/6/2023.    Current diabetes regimen:  -Jardiance 25 mg daily--just increased in 4/2023 (patient had not increased as we planned after initial visit).  -Lantus 12 units daily in the morning  -Glipizide 10 mg twice daily    Using Freestyle Marti but unable to download CGM for review.  I reviewed glucose data which is detailed in nursing note.    Pertinent endocrine and related history:  1.  Diabetes mellitus type 2.  -Ms. Aparicio is uncertain when she was diagnosed with diabetes: Progress Notes as far back as 2008 mention diagnosis of type 2 diabetes.  She previously was seen in endocrinology at [facility].    Past diabetes medications:  -Metformin, developed diarrhea, has tried both extended release and short acting formulations  -Januvia appeared on medication list previously, does not recall side effect with this  -Insulin, recalls developing  hypoglycemia prompting discontinuation  2.  Diabetic peripheral neuropathy.    Her medical history is also notable for COPD (on home oxygen), hypertension, GERD with dysphagia, history of right lower lobe adenocarcinoma of the lung (post lobectomy in 2016) and right upper lobe non-small cell lung cancer (post radiation therapy in 2020).  A right upper lobe lung nodule has been noted to be increasing in size on chest CT.    PAST MEDICAL HISTORY  Past Medical History:   Diagnosis Date    Adenocarcinoma, lung (H)     Asthma     Ectopic pregnancy     Esophageal reflux     Pulmonary emphysema (H)     Very severe FEV1<30% predicted    Type II diabetes mellitus (H)        MEDICATIONS  Current Outpatient Medications   Medication Sig Dispense Refill    acetylcysteine (MUCOMYST) 10 % nebulizer solution Inhale 4 mLs into the lungs 4 times daily 480 mL 0    albuterol (PROAIR HFA/PROVENTIL HFA/VENTOLIN HFA) 108 (90 Base) MCG/ACT inhaler USE 1 OR 2 INHALATIONS EVERY 4 HOURS AS NEEDED *Keep appt on 3/16/23 for further refills* 17 g 0    albuterol (PROVENTIL) (2.5 MG/3ML) 0.083% neb solution Take 1 vial (2.5 mg) by nebulization 4 times daily 360 mL 0    alpha-lipoic acid 100 MG capsule Take 200 mg by mouth daily as needed      amLODIPine (NORVASC) 10 MG tablet Take 1 tablet (10 mg) by mouth daily 90 tablet 3    aspirin 81 MG EC tablet Take 1 tablet (81 mg) by mouth daily 90 tablet 3    buPROPion (WELLBUTRIN XL) 300 MG 24 hr tablet Take 1 tablet (300 mg) by mouth every morning 90 tablet 3    cetirizine (ZYRTEC) 10 MG tablet Take 1 tablet (10 mg) by mouth daily 30 tablet 10    Continuous Blood Gluc  (FREESTYLE GIOVANNI 2 READER) JOSEPHINE 1 each daily For continuous glucose monitoring 1 each 0    Continuous Blood Gluc Sensor (FREESTYLE GIOVANNI 14 DAY SENSOR) MISC Change every 14 days. 2 each 11    Continuous Blood Gluc Sensor (FREESTYLE GIOVANNI 2 SENSOR) MISC 1 each every 14 days For use with Freestyle Giovanni 2  for continuous  monitioring of blood glucose levels. Replace sensor every 14 days. 2 each 11    doxycycline hyclate (VIBRA-TABS) 100 MG tablet Take 1 tablet (100 mg) by mouth 2 times daily 10 tablet 1    Easy Comfort Lancets MISC       empagliflozin (JARDIANCE) 25 MG TABS tablet Take 1 tablet (25 mg) by mouth daily 90 tablet 1    fluticasone (FLONASE) 50 MCG/ACT nasal spray Spray 1 spray into both nostrils daily Use at night before bed 15.8 mL 7    Fluticasone-Umeclidin-Vilanterol (TRELEGY ELLIPTA) 200-62.5-25 MCG/INH oral inhaler Inhale 1 puff into the lungs daily 28 each 5    glipiZIDE (GLUCOTROL) 5 MG tablet Take 2 tablets (10mg) in the AM and 2 tablet (5mg) at PM daily. 90 tablet 0    GLUCOSAMINE-CHONDROITIN -400 MG tablet TAKE 1 TABLET DAILY 90 tablet 3    insulin glargine (LANTUS PEN) 100 UNIT/ML pen Inject 14 Units Subcutaneous At Bedtime 15 mL 11    insulin glargine (LANTUS PEN) 100 UNIT/ML pen Inject 14 Units Subcutaneous daily 15 mL 1    insulin glargine (LANTUS SOLOSTAR) 100 UNIT/ML pen Inject 14 Units Subcutaneous daily, Disp 15 mL 3    insulin pen needle (BD MARCIO U/F) 32G X 4 MM miscellaneous Use 1 pen needle daily or as directed. 100 each 1    ipratropium - albuterol 0.5 mg/2.5 mg/3 mL (DUONEB) 0.5-2.5 (3) MG/3ML neb solution Take 1 vial by nebulization every 6 hours as needed for shortness of breath, wheezing or cough      ketotifen (ZADITOR) 0.025 % ophthalmic solution Place 1 drop into both eyes daily      Lancet Devices (EASY MINI EJECT LANCING DEVICE) MISC       Misc. Devices (PULSE OXIMETER FOR FINGER) MISC 1 Device as needed (copd) 1 each 0    multivitamin w/minerals (THERA-VIT-M) tablet Take 1 tablet by mouth daily 30 tablet 11    nicotine (NICORETTE) 4 MG lozenge Place 1 lozenge (4 mg) inside cheek every hour as needed for smoking cessation 100 lozenge 0    nicotine (NICOTROL) 10 MG inhaler Use 1 cartridge as needed for urge to smoke by puffing over course of 20min.  Use 6-16 cart/day; reduce number of  cart/day over 6-12 weeks. 160 each 0    omega-3 acid ethyl esters (LOVAZA) 1 g capsule Take 2 capsules (2 g) by mouth 2 times daily Please call [phone] to schedule follow up visit for more refills. 360 capsule 0    omeprazole (PRILOSEC) 20 MG DR capsule Take 1 capsule (20 mg) by mouth 2 times daily 180 capsule 3    polyethylene glycol-propylene glycol (SYSTANE) 0.4-0.3 % SOLN ophthalmic solution Place 1 drop into both eyes 4 times daily 5 mL 11    predniSONE (DELTASONE) 20 MG tablet Take 2 tablets (40 mg) by mouth daily 10 tablet 3    predniSONE (DELTASONE) 20 MG tablet Take 0.5 tablets (10 mg) by mouth daily 32 tablet 0    pregabalin (LYRICA) 150 MG capsule Take 1 capsule (150 mg) by mouth 2 times daily 60 capsule 3    propylene glycol (SYSTANE BALANCE) 0.6 % SOLN ophthalmic solution       roflumilast (DALIRESP) 250 MCG TABS tablet Take 1 tablet (250 mcg) by mouth daily for 28 days, THEN 2 tablets (500 mcg) daily for 340 days. 708 tablet 0    STATIN NOT PRESCRIBED (INTENTIONAL) Please choose reason not prescribed from choices below.         Allergies, family, and social history were reviewed and documented as needed in EHR.     REVIEW OF SYSTEMS  A focused ROS was performed, with pertinent positives and negatives as noted in the HPI.    PHYSICAL EXAM  There were no vitals taken for this visit.  There is no height or weight on file to calculate BMI.  No exam.    DATA REVIEW  Each of the following laboratory and/or imaging studies were reviewed.      Component      Latest Ref Rng 4/17/2023  9:32 AM 4/17/2023  9:37 AM   Sodium      136 - 145 mmol/L 145     Potassium      3.4 - 5.3 mmol/L 4.9     Chloride      98 - 107 mmol/L 105     Carbon Dioxide (CO2)      22 - 29 mmol/L 36 (H)     Anion Gap      7 - 15 mmol/L 4 (L)     Urea Nitrogen      8.0 - 23.0 mg/dL 7.3 (L)     Creatinine      0.51 - 0.95 mg/dL 0.55     Calcium      8.8 - 10.2 mg/dL 9.6     Glucose      70 - 99 mg/dL 165 (H)     Alkaline Phosphatase       35 - 104 U/L 89     AST      10 - 35 U/L 14     ALT      10 - 35 U/L 10     Protein Total      6.4 - 8.3 g/dL 7.1     Albumin      3.5 - 5.2 g/dL 4.1     Bilirubin Total      <=1.2 mg/dL 0.3     GFR Estimate      >60 mL/min/1.73m2 >90     Creatinine Urine      mg/dL  59.7    Albumin Urine mg/L      mg/L  38.1    Albumin Urine mg/g Cr      0.00 - 25.00 mg/g Cr  63.82 (H)    Hemoglobin A1C      <5.7 % 10.5 (H)     Vitamin B12      232 - 1,245 pg/mL 244        Legend:  (H) High  (L) Low    ASSESSMENT  1.  Diabetes mellitus, type 2.  No CGM data but reported glucose data (please refer to nursing note with glucose data) indicates intermittent hypoglycemia (often fasting) and hyperglycemia in the latter part of the day.  Would reduce Lantus dose given hypoglycemia; would consider GLP-1 receptor agonist since we would like to maintain as simple regimen as feasible.  Patient has no history of pancreatitis or personal/family history of thyroid cancer/MEN.  We discussed benefits and potential side effects of GLP-1 receptor agonist and potential for nausea with initiation of this class of drugs.  Has follow-up with diabetes care and  next month: Have sent prescription for GLP-1 receptor agonist and she will take pen with her for additional education.    2.  Diabetes preventive care.  -Diabetic neuropathy, as discussed below  -Microalbuminuria on previsit labs; recheck prior to follow-up appointment in a few months and if persistent consider ACE inhibitor  -Eye exam in our system in 1/2022 did not show diabetic retinopathy; Ms. Aparicio has not yet scheduled follow-up, discuss at next visit    3.  Diabetic neuropathy.  We increased dose of Lyrica last visit.  B12 level checked prior to this appointment is borderline: Start supplementation.      PLAN  -Reduce Lantus to 10 units every morning  -For now continue Jardiance and glipizide  -I will send prescription for Ozempic to [facility] pharmacy: If  medication is received, please refrigerate and bring 1 pen to next visit with Nithya Narvaez on 5/25/2023 for follow-up on blood glucose and injection teaching--we will start with 0.25 mg injected weekly for 4 weeks and if tolerating, increase to 0.5 mg weekly.  -When we start Ozempic, we may discontinue glipizide  -Start vitamin B12 500 mcg daily (sent to pharmacy)  -Return for a follow-up visit in [facility], with labs before visit  -We will communicate results by letter, or if needed by phone      No orders of the defined types were placed in this encounter.      Telephone start time: 10:08 AM  Telephone stop time: 10:27 AM    I spent a total of 40 minutes on the date of encounter reviewing medical records, evaluating the patient, coordinating care and documenting in the EHR, as detailed above.      La Nena Shabazz MD   Division of Diabetes, Endocrinology and Metabolism  Department of Medicine      cc: Zarina Leung MD; BENOIT Ruiz CNP; Nithya Narvaez RN [facility]

## 2023-04-24 NOTE — PROGRESS NOTES
ENDOCRINOLOGY FOLLOW-UP        HISTORY OF PRESENT ILLNESS    Jenn Aparicio is seen in follow-up--we attempted video visit but due to technical issues transition to a telephone visit.    Difficulty with follow-up in the interim since initial visit.  Hospitalizations since we saw each other in 1/2023: Admitted in February twice with severe COPD exacerbation.    Most recently seen in pulmonology on 4/21/2023: Prescribed prednisone and doxycycline for suspected COPD exacerbation.  Her pulmonary team suspected there may be long-term need for prednisone to manage exacerbations. Has not needed to start prednisone yet.    Has seen diabetes care and  in the interim since last visit: Due to escalating hyperglycemia, we initiated insulin on 4/6/2023.    Current diabetes regimen:  -Jardiance 25 mg daily--just increased in 4/2023 (patient had not increased as we planned after initial visit).  -Lantus 12 units daily in the morning  -Glipizide 10 mg twice daily    Using Freestyle Marti but unable to download CGM for review.  I reviewed glucose data which is detailed in nursing note.    Pertinent endocrine and related history:  1.  Diabetes mellitus type 2.  -Ms. Aparicio is uncertain when she was diagnosed with diabetes: Progress Notes as far back as 2008 mention diagnosis of type 2 diabetes.  She previously was seen in endocrinology at [facility] [place][note 2].    Past diabetes medications:  -Metformin, developed diarrhea, has tried both extended release and short acting formulations  -Januvia appeared on medication list previously, does not recall side effect with this  -Insulin, recalls developing hypoglycemia prompting discontinuation  2.  Diabetic peripheral neuropathy.    Her medical history is also notable for COPD (on home oxygen), hypertension, GERD with dysphagia, history of right lower lobe adenocarcinoma of the lung (post lobectomy in 2016) and right upper lobe non-small cell lung cancer (post radiation  therapy in 2020).  A right upper lobe lung nodule has been noted to be increasing in size on chest CT.    PAST MEDICAL HISTORY  Past Medical History:   Diagnosis Date     Adenocarcinoma, lung (H)      Asthma      Ectopic pregnancy      Esophageal reflux      Pulmonary emphysema (H)     Very severe FEV1<30% predicted     Type II diabetes mellitus (H)        MEDICATIONS  Current Outpatient Medications   Medication Sig Dispense Refill     acetylcysteine (MUCOMYST) 10 % nebulizer solution Inhale 4 mLs into the lungs 4 times daily 480 mL 0     albuterol (PROAIR HFA/PROVENTIL HFA/VENTOLIN HFA) 108 (90 Base) MCG/ACT inhaler USE 1 OR 2 INHALATIONS EVERY 4 HOURS AS NEEDED *Keep appt on 3/16/23 for further refills* 17 g 0     albuterol (PROVENTIL) (2.5 MG/3ML) 0.083% neb solution Take 1 vial (2.5 mg) by nebulization 4 times daily 360 mL 0     alpha-lipoic acid 100 MG capsule Take 200 mg by mouth daily as needed       amLODIPine (NORVASC) 10 MG tablet Take 1 tablet (10 mg) by mouth daily 90 tablet 3     aspirin 81 MG EC tablet Take 1 tablet (81 mg) by mouth daily 90 tablet 3     buPROPion (WELLBUTRIN XL) 300 MG 24 hr tablet Take 1 tablet (300 mg) by mouth every morning 90 tablet 3     cetirizine (ZYRTEC) 10 MG tablet Take 1 tablet (10 mg) by mouth daily 30 tablet 10     Continuous Blood Gluc  (FREESTYLE GIOVANNI 2 READER) JOSEPHINE 1 each daily For continuous glucose monitoring 1 each 0     Continuous Blood Gluc Sensor (FREESTYLE GIOVANNI 14 DAY SENSOR) MISC Change every 14 days. 2 each 11     Continuous Blood Gluc Sensor (FREESTYLE GIOVANNI 2 SENSOR) MISC 1 each every 14 days For use with Freestyle Giovanni 2  for continuous monitioring of blood glucose levels. Replace sensor every 14 days. 2 each 11     doxycycline hyclate (VIBRA-TABS) 100 MG tablet Take 1 tablet (100 mg) by mouth 2 times daily 10 tablet 1     Easy Comfort Lancets MISC        empagliflozin (JARDIANCE) 25 MG TABS tablet Take 1 tablet (25 mg) by mouth daily 90  tablet 1     fluticasone (FLONASE) 50 MCG/ACT nasal spray Spray 1 spray into both nostrils daily Use at night before bed 15.8 mL 7     Fluticasone-Umeclidin-Vilanterol (TRELEGY ELLIPTA) 200-62.5-25 MCG/INH oral inhaler Inhale 1 puff into the lungs daily 28 each 5     glipiZIDE (GLUCOTROL) 5 MG tablet Take 2 tablets (10mg) in the AM and 2 tablet (5mg) at PM daily. 90 tablet 0     GLUCOSAMINE-CHONDROITIN -400 MG tablet TAKE 1 TABLET DAILY 90 tablet 3     insulin glargine (LANTUS PEN) 100 UNIT/ML pen Inject 14 Units Subcutaneous At Bedtime 15 mL 11     insulin glargine (LANTUS PEN) 100 UNIT/ML pen Inject 14 Units Subcutaneous daily 15 mL 1     insulin glargine (LANTUS SOLOSTAR) 100 UNIT/ML pen Inject 14 Units Subcutaneous daily, Disp 15 mL 3     insulin pen needle (BD MARCIO U/F) 32G X 4 MM miscellaneous Use 1 pen needle daily or as directed. 100 each 1     ipratropium - albuterol 0.5 mg/2.5 mg/3 mL (DUONEB) 0.5-2.5 (3) MG/3ML neb solution Take 1 vial by nebulization every 6 hours as needed for shortness of breath, wheezing or cough       ketotifen (ZADITOR) 0.025 % ophthalmic solution Place 1 drop into both eyes daily       Lancet Devices (EASY MINI EJECT LANCING DEVICE) MISC        Misc. Devices (PULSE OXIMETER FOR FINGER) MISC 1 Device as needed (copd) 1 each 0     multivitamin w/minerals (THERA-VIT-M) tablet Take 1 tablet by mouth daily 30 tablet 11     nicotine (NICORETTE) 4 MG lozenge Place 1 lozenge (4 mg) inside cheek every hour as needed for smoking cessation 100 lozenge 0     nicotine (NICOTROL) 10 MG inhaler Use 1 cartridge as needed for urge to smoke by puffing over course of 20min.  Use 6-16 cart/day; reduce number of cart/day over 6-12 weeks. 160 each 0     omega-3 acid ethyl esters (LOVAZA) 1 g capsule Take 2 capsules (2 g) by mouth 2 times daily Please call [phone] to schedule follow up visit for more refills. 360 capsule 0     omeprazole (PRILOSEC) 20 MG DR capsule Take 1 capsule (20 mg) by  mouth 2 times daily 180 capsule 3     polyethylene glycol-propylene glycol (SYSTANE) 0.4-0.3 % SOLN ophthalmic solution Place 1 drop into both eyes 4 times daily 5 mL 11     predniSONE (DELTASONE) 20 MG tablet Take 2 tablets (40 mg) by mouth daily 10 tablet 3     predniSONE (DELTASONE) 20 MG tablet Take 0.5 tablets (10 mg) by mouth daily 32 tablet 0     pregabalin (LYRICA) 150 MG capsule Take 1 capsule (150 mg) by mouth 2 times daily 60 capsule 3     propylene glycol (SYSTANE BALANCE) 0.6 % SOLN ophthalmic solution        roflumilast (DALIRESP) 250 MCG TABS tablet Take 1 tablet (250 mcg) by mouth daily for 28 days, THEN 2 tablets (500 mcg) daily for 340 days. 708 tablet 0     STATIN NOT PRESCRIBED (INTENTIONAL) Please choose reason not prescribed from choices below.         Allergies, family, and social history were reviewed and documented as needed in EHR.     REVIEW OF SYSTEMS  A focused ROS was performed, with pertinent positives and negatives as noted in the HPI.    PHYSICAL EXAM  There were no vitals taken for this visit.  There is no height or weight on file to calculate BMI.  No exam.    DATA REVIEW  Each of the following laboratory and/or imaging studies were reviewed.      Component      Latest Ref Rng 4/17/2023  9:32 AM 4/17/2023  9:37 AM   Sodium      136 - 145 mmol/L 145     Potassium      3.4 - 5.3 mmol/L 4.9     Chloride      98 - 107 mmol/L 105     Carbon Dioxide (CO2)      22 - 29 mmol/L 36 (H)     Anion Gap      7 - 15 mmol/L 4 (L)     Urea Nitrogen      8.0 - 23.0 mg/dL 7.3 (L)     Creatinine      0.51 - 0.95 mg/dL 0.55     Calcium      8.8 - 10.2 mg/dL 9.6     Glucose      70 - 99 mg/dL 165 (H)     Alkaline Phosphatase      35 - 104 U/L 89     AST      10 - 35 U/L 14     ALT      10 - 35 U/L 10     Protein Total      6.4 - 8.3 g/dL 7.1     Albumin      3.5 - 5.2 g/dL 4.1     Bilirubin Total      <=1.2 mg/dL 0.3     GFR Estimate      >60 mL/min/1.73m2 >90     Creatinine Urine      mg/dL  59.7     Albumin Urine mg/L      mg/L  38.1    Albumin Urine mg/g Cr      0.00 - 25.00 mg/g Cr  63.82 (H)    Hemoglobin A1C      <5.7 % 10.5 (H)     Vitamin B12      232 - 1,245 pg/mL 244        Legend:  (H) High  (L) Low    ASSESSMENT  1.  Diabetes mellitus, type 2.  No CGM data but reported glucose data (please refer to nursing note with glucose data) indicates intermittent hypoglycemia (often fasting) and hyperglycemia in the latter part of the day.  Would reduce Lantus dose given hypoglycemia; would consider GLP-1 receptor agonist since we would like to maintain as simple regimen as feasible.  Patient has no history of pancreatitis or personal/family history of thyroid cancer/MEN.  We discussed benefits and potential side effects of GLP-1 receptor agonist and potential for nausea with initiation of this class of drugs.  Has follow-up with diabetes care and  next month: Have sent prescription for GLP-1 receptor agonist and she will take pen with her for additional education.    2.  Diabetes preventive care.  -Diabetic neuropathy, as discussed below  -Microalbuminuria on previsit labs; recheck prior to follow-up appointment in a few months and if persistent consider ACE inhibitor  -Eye exam in our system in 1/2022 did not show diabetic retinopathy; Ms. Aparicio has not yet scheduled follow-up, discuss at next visit    3.  Diabetic neuropathy.  We increased dose of Lyrica last visit.  B12 level checked prior to this appointment is borderline: Start supplementation.      PLAN  -Reduce Lantus to 10 units every morning  -For now continue Jardiance and glipizide  -I will send prescription for Ozempic to [facility]: If medication is received, please refrigerate and bring 1 pen to next visit with Nithya Narvaez on 5/25/2023 for follow-up on blood glucose and injection teaching--we will start with 0.25 mg injected weekly for 4 weeks and if tolerating, increase to 0.5 mg weekly.  -When we start  Ozempic, we may discontinue glipizide  -Start vitamin B12 500 mcg daily (sent to pharmacy)  -Return for a follow-up visit in [facility] available, with labs before visit  -We will communicate results by letter, or if needed by phone      No orders of the defined types were placed in this encounter.      Telephone start time: 10:08 AM  Telephone stop time: 10:27 AM    I spent a total of 40 minutes on the date of encounter reviewing medical records, evaluating the patient, coordinating care and documenting in the EHR, as detailed above.      La Nena Shabazz MD   Division of Diabetes, Endocrinology and Metabolism  Department of Medicine      cc: Zarina Leung MD; BENOIT Ruiz CNP; Nithya Narvaez RN [facility]

## 2023-04-25 LAB
DLCOUNC-%PRED-PRE: 17 %
DLCOUNC-PRE: 3.68 ML/MIN/MMHG
DLCOUNC-PRED: 21.04 ML/MIN/MMHG
ERV-%PRED-PRE: 98 %
ERV-PRE: 0.53 L
ERV-PRED: 0.53 L
EXPTIME-PRE: 7.64 SEC
FEF2575-%PRED-PRE: 9 %
FEF2575-PRE: 0.19 L/SEC
FEF2575-PRED: 1.96 L/SEC
FEFMAX-%PRED-PRE: 21 %
FEFMAX-PRE: 1.31 L/SEC
FEFMAX-PRED: 6.17 L/SEC
FEV1-%PRED-PRE: 24 %
FEV1-PRE: 0.55 L
FEV1FEV6-PRE: 40 %
FEV1FEV6-PRED: 80 %
FEV1FVC-PRE: 40 %
FEV1FVC-PRED: 79 %
FEV1SVC-PRE: 49 %
FEV1SVC-PRED: 68 %
FIFMAX-PRE: 1.56 L/SEC
FVC-%PRED-PRE: 46 %
FVC-PRE: 1.36 L
FVC-PRED: 2.91 L
IC-%PRED-PRE: 20 %
IC-PRE: 0.59 L
IC-PRED: 2.85 L
VA-%PRED-PRE: 36 %
VA-PRE: 1.9 L
VC-%PRED-PRE: 32 %
VC-PRE: 1.11 L
VC-PRED: 3.38 L

## 2023-04-26 ENCOUNTER — ANCILLARY PROCEDURE (OUTPATIENT)
Dept: GENERAL RADIOLOGY | Facility: CLINIC | Age: 68
End: 2023-04-26
Payer: COMMERCIAL

## 2023-04-26 ENCOUNTER — DOCUMENTATION ONLY (OUTPATIENT)
Dept: INTERNAL MEDICINE | Facility: CLINIC | Age: 68
End: 2023-04-26

## 2023-04-26 DIAGNOSIS — J44.9 CHRONIC OBSTRUCTIVE PULMONARY DISEASE, UNSPECIFIED COPD TYPE (H): ICD-10-CM

## 2023-04-26 DIAGNOSIS — R06.00 PND (PAROXYSMAL NOCTURNAL DYSPNEA): ICD-10-CM

## 2023-04-26 PROCEDURE — 71046 X-RAY EXAM CHEST 2 VIEWS: CPT | Mod: GC | Performed by: RADIOLOGY

## 2023-04-26 RX ORDER — OMEGA-3-ACID ETHYL ESTERS 1 G/1
CAPSULE, LIQUID FILLED ORAL
Qty: 360 CAPSULE | Refills: 3 | Status: SHIPPED | OUTPATIENT
Start: 2023-04-26 | End: 2024-07-09

## 2023-04-26 NOTE — PROGRESS NOTES
Type of Form Received:     Form Received (Date) 4/26/23   Form Filled out No   Placed in provider folder Yes

## 2023-04-26 NOTE — TELEPHONE ENCOUNTER
omega-3 acid ethyl esters (LOVAZA) 1 g capsule      Last Written Prescription Date:  01-  Last Fill Quantity: 360,   # refills: 0  Last Office Visit : 3-  Future Office visit:  7-    Routing refill request to provider for review/approval because:  Antihyperlipidemic agents Failed 04/24/2023 12:25 AM   Protocol Details  Lipid panel on file in past 12 mos     Lab Test 01/27/22  1439   CHOL 251*   TRIG 127   HDL 58   *   NHDL 193*

## 2023-04-27 ENCOUNTER — DOCUMENTATION ONLY (OUTPATIENT)
Dept: INTERNAL MEDICINE | Facility: CLINIC | Age: 68
End: 2023-04-27
Payer: COMMERCIAL

## 2023-04-27 ENCOUNTER — MEDICAL CORRESPONDENCE (OUTPATIENT)
Dept: HEALTH INFORMATION MANAGEMENT | Facility: CLINIC | Age: 68
End: 2023-04-27
Payer: COMMERCIAL

## 2023-04-27 NOTE — PROGRESS NOTES
Type of Form Received:     Form Received (Date) 4/27/23   Form Filled out Yes 4/28/23   Placed in provider folder Yes

## 2023-05-01 ENCOUNTER — DOCUMENTATION ONLY (OUTPATIENT)
Dept: INTERNAL MEDICINE | Facility: CLINIC | Age: 68
End: 2023-05-01
Payer: COMMERCIAL

## 2023-05-01 NOTE — PROGRESS NOTES
Type of Form Received:     Form Received (Date) 5/1/23   Form Filled out Yes 5/3/23   Placed in provider folder Yes

## 2023-05-02 ENCOUNTER — MEDICAL CORRESPONDENCE (OUTPATIENT)
Dept: HEALTH INFORMATION MANAGEMENT | Facility: CLINIC | Age: 68
End: 2023-05-02
Payer: COMMERCIAL

## 2023-05-03 ENCOUNTER — TELEPHONE (OUTPATIENT)
Dept: INTERNAL MEDICINE | Facility: CLINIC | Age: 68
End: 2023-05-03
Payer: COMMERCIAL

## 2023-05-03 DIAGNOSIS — J96.21 ACUTE AND CHRONIC RESPIRATORY FAILURE WITH HYPOXIA (H): Primary | ICD-10-CM
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
## 2023-05-04 ENCOUNTER — VIRTUAL VISIT (OUTPATIENT)
Dept: PHARMACY | Facility: CLINIC | Age: 68
End: 2023-05-04
Payer: COMMERCIAL

## 2023-05-04 DIAGNOSIS — E11.9 TYPE 2 DIABETES MELLITUS WITHOUT COMPLICATION, WITHOUT LONG-TERM CURRENT USE OF INSULIN (H): Primary | ICD-10-CM

## 2023-05-04 DIAGNOSIS — Z72.0 TOBACCO ABUSE: ICD-10-CM

## 2023-05-04 DIAGNOSIS — Z78.9 TAKES DIETARY SUPPLEMENTS: ICD-10-CM

## 2023-05-04 DIAGNOSIS — J44.9 CHRONIC OBSTRUCTIVE PULMONARY DISEASE, UNSPECIFIED COPD TYPE (H): ICD-10-CM

## 2023-05-04 DIAGNOSIS — J30.2 SEASONAL ALLERGIC RHINITIS, UNSPECIFIED TRIGGER: ICD-10-CM

## 2023-05-04 DIAGNOSIS — E78.5 HYPERLIPIDEMIA, UNSPECIFIED HYPERLIPIDEMIA TYPE: ICD-10-CM

## 2023-05-04 DIAGNOSIS — E11.40 DIABETIC NEUROPATHY (H): ICD-10-CM

## 2023-05-04 DIAGNOSIS — E11.42 DIABETIC POLYNEUROPATHY ASSOCIATED WITH TYPE 2 DIABETES MELLITUS (H): ICD-10-CM

## 2023-05-04 DIAGNOSIS — I10 ESSENTIAL HYPERTENSION: ICD-10-CM

## 2023-05-04 DIAGNOSIS — K21.9 GASTROESOPHAGEAL REFLUX DISEASE WITHOUT ESOPHAGITIS: ICD-10-CM

## 2023-05-04 PROCEDURE — 99605 MTMS BY PHARM NP 15 MIN: CPT | Performed by: PHARMACIST

## 2023-05-04 PROCEDURE — 99607 MTMS BY PHARM ADDL 15 MIN: CPT | Performed by: PHARMACIST

## 2023-05-04 RX ORDER — IPRATROPIUM BROMIDE AND ALBUTEROL SULFATE 2.5; .5 MG/3ML; MG/3ML
1 SOLUTION RESPIRATORY (INHALATION) EVERY 6 HOURS PRN
COMMUNITY
End: 2023-08-09

## 2023-05-04 RX ORDER — CETIRIZINE HYDROCHLORIDE 10 MG/1
10 TABLET ORAL DAILY
Qty: 30 TABLET | Refills: 10 | Status: SHIPPED | OUTPATIENT
Start: 2023-05-04 | End: 2023-06-09

## 2023-05-04 RX ORDER — FLUTICASONE PROPIONATE 50 MCG
1 SPRAY, SUSPENSION (ML) NASAL DAILY
Qty: 15.8 ML | Refills: 7 | Status: SHIPPED | OUTPATIENT
Start: 2023-05-04 | End: 2023-08-09

## 2023-05-04 RX ORDER — NICOTINE 21 MG/24HR
1 PATCH, TRANSDERMAL 24 HOURS TRANSDERMAL EVERY 24 HOURS
COMMUNITY
End: 2024-02-08

## 2023-05-04 RX ORDER — ALBUTEROL SULFATE 90 UG/1
AEROSOL, METERED RESPIRATORY (INHALATION)
Qty: 17 G | Refills: 0 | Status: SHIPPED | OUTPATIENT
Start: 2023-05-04 | End: 2023-05-04

## 2023-05-04 RX ORDER — ALBUTEROL SULFATE 90 UG/1
AEROSOL, METERED RESPIRATORY (INHALATION)
Qty: 17 G | Refills: 3 | Status: SHIPPED | OUTPATIENT
Start: 2023-05-04 | End: 2024-02-05

## 2023-05-04 RX ORDER — SAW/PYGEUM/BETA/HERB/D3/B6/ZN 30 MG-25MG
1 CAPSULE ORAL DAILY
Qty: 90 CAPSULE | Refills: 3 | Status: SHIPPED | OUTPATIENT
Start: 2023-05-04 | End: 2024-01-11

## 2023-05-04 NOTE — LETTER
_  Medication List        Prepared on: May 4, 2023     Bring your Medication List when you go to the doctor, hospital, or   emergency room. And, share it with your family or caregivers.     Note any changes to how you take your medications.  Cross out medications when you no longer use them.    Medication How I take it Why I use it Prescriber   acetylcysteine (MUCOMYST) 10 % nebulizer solution Inhale 4 mLs into the lungs 4 times daily Acute and chronic respiratory failure with hypoxia (H) BENOIT Razo CNP   albuterol (PROAIR HFA/PROVENTIL HFA/VENTOLIN HFA) 108 (90 Base) MCG/ACT inhaler USE 1 OR 2 INHALATIONS EVERY 4 HOURS AS NEEDED Chronic obstructive pulmonary disease, unspecified COPD type (H) BENOIT Razo CNP   albuterol (PROVENTIL) (2.5 MG/3ML) 0.083% neb solution Take 1 vial (2.5 mg) by nebulization 4 times daily COPD Exacerbation (H) BENOIT Razo CNP   Alpha Lipoic Acid 200 MG CAPS Take 1 capsule by mouth daily Diabetic Neuropathy (H) BENOIT Razo CNP   amLODIPine (NORVASC) 10 MG tablet Take 1 tablet (10 mg) by mouth daily Essential Hypertension BENOIT Razo CNP   aspirin 81 MG EC tablet Take 1 tablet (81 mg) by mouth daily Essential Hypertension; Type 2 diabetes mellitus without complication, without long-term current use of insulin (H) BENOIT Razo CNP   buPROPion (WELLBUTRIN XL) 300 MG 24 hr tablet Take 1 tablet (300 mg) by mouth every morning Cigarette Smoker BENOIT Razo CNP   cetirizine (ZYRTEC) 10 MG tablet Take 1 tablet (10 mg) by mouth daily Seasonal allergic rhinitis, unspecified trigger BENOIT Razo CNP   cyanocobalamin (VITAMIN B-12) 500 MCG tablet Take 1 tablet (500 mcg) by mouth daily Vitamin B12 Deficiency (Non Anemic) La Nena Shabazz MD   doxycycline hyclate (VIBRA-TABS) 100 MG tablet Take 1 tablet (100 mg) by mouth 2 times daily Chronic obstructive pulmonary disease, unspecified COPD type (H) Kailee  MD Kirt   empagliflozin (JARDIANCE) 25 MG TABS tablet Take 1 tablet (25 mg) by mouth daily Type 2 diabetes mellitus with hyperglycemia, without long-term current use of insulin (H) La Nena Shabazz MD   fluticasone (FLONASE) 50 MCG/ACT nasal spray Spray 1 spray into both nostrils daily Use at night before bed Seasonal allergic rhinitis, unspecified trigger BENOIT Razo CNP   Fluticasone-Umeclidin-Vilanterol (TRELEGY ELLIPTA) 200-62.5-25 MCG/INH oral inhaler Inhale 1 puff into the lungs daily Chronic obstructive pulmonary disease, unspecified COPD type (H) BENOIT Razo CNP   glipiZIDE (GLUCOTROL) 5 MG tablet Take 2 tablets (10mg) in the AM and 2 tablet (5mg) at PM daily. Type 2 diabetes mellitus without complication, without long-term current use of insulin (H) BENOIT Razo CNP   GLUCOSAMINE-CHONDROITIN -400 MG tablet TAKE 1 TABLET DAILY Type 2 diabetes mellitus without complication, without long-term current use of insulin (H) BENOIT Razo CNP   insulin glargine (LANTUS PEN) 100 UNIT/ML pen Inject 14 Units Subcutaneous At Bedtime Type 2 diabetes mellitus without complication, without long-term current use of insulin (H) La Nena Shabazz MD   insulin pen needle (BD MARCIO U/F) 32G X 4 MM miscellaneous Use 1 pen needle daily or as directed. Type 2 diabetes mellitus without complication, without long-term current use of insulin (H) La Nena Shabazz MD   ipratropium - albuterol 0.5 mg/2.5 mg/3 mL (DUONEB) 0.5-2.5 (3) MG/3ML neb solution Take 1 vial by nebulization every 6 hours as needed for shortness of breath, wheezing or cough  COPD Patient Reported   ketotifen (ZADITOR) 0.025 % ophthalmic solution Place 1 drop into both eyes daily  Allergies/Itchy eye Patient Reported   multivitamin w/minerals (THERA-VIT-M) tablet Take 1 tablet by mouth daily  Supplement BENOIT Razo CNP   nicotine (NICODERM CQ) 14 MG/24HR 24 hr patch Place 1 patch onto the skin every  24 hours  Smoking Patient Reported   nicotine (NICORETTE) 4 MG lozenge Place 1 lozenge (4 mg) inside cheek every hour as needed for smoking cessation Cigarette Smoker Edy Nash MD   nicotine (NICOTROL) 10 MG inhaler Use 1 cartridge as needed for urge to smoke by puffing over course of 20min.  Use 6-16 cart/day; reduce number of cart/day over 6-12 weeks. Cigarette Smoker Edy Nash MD   omega-3 acid ethyl esters (LOVAZA) 1 g capsule Take 2 capsules (2 g) by mouth 2 times daily Type 2 diabetes mellitus without complication, without long-term current use of insulin (H) BENOIT Razo CNP   omeprazole (PRILOSEC) 20 MG DR capsule Take 1 capsule (20 mg) by mouth 2 times daily Gastroesophageal reflux disease, unspecified whether esophagitis present Travis Briseno MD   polyethylene glycol-propylene glycol (SYSTANE) 0.4-0.3 % SOLN ophthalmic solution Place 1 drop into both eyes 4 times daily Dry eye syndrome of both eyes BENOIT Razo CNP   predniSONE (DELTASONE) 20 MG tablet Take 2 tablets (40 mg) by mouth daily Chronic obstructive pulmonary disease, unspecified COPD type (H) Kailee Moralez MD   pregabalin (LYRICA) 150 MG capsule Take 1 capsule (150 mg) by mouth 2 times daily Type 2 diabetes mellitus with hyperglycemia, without long-term current use of insulin (H); Diabetic peripheral neuropathy (H) BENOIT Razo CNP   roflumilast (DALIRESP) 250 MCG TABS tablet Take 1 tablet (250 mcg) by mouth daily for 28 days, THEN 2 tablets (500 mcg) daily for 340 days. Chronic obstructive pulmonary disease, unspecified COPD type (H) Kailee Moralez MD   semaglutide (OZEMPIC) 2 MG/3ML pen Start 0.25 mg injected weekly for 4 weeks and if tolerating, increase to 0.5 mg weekly. Type 2 diabetes mellitus with hyperglycemia, without long-term current use of insulin (H) La Nena Shabazz MD         Add new medications, over-the-counter drugs, herbals, vitamins, or  minerals in the blank  rows below.    Medication How I take it Why I use it Prescriber                                      Allergies:      interferons; penicillins; aspirin; colon care        Side effects I have had:               Other Information:              My notes and questions:

## 2023-05-04 NOTE — LETTER
"Recommended To-Do List      Prepared on: May 4, 2023       You can get the best results from your medications by completing the items on this \"To-Do List.\"      Bring your To-Do List when you go to your doctor. And, share it with your family or caregivers.    My To-Do List:  What we talked about: What I should do:   The importance of taking your medication as intended    Education: Use Duoneb four times a day            What we talked about: What I should do:   A medication that is not working    Start taking alphalipoic acid for your nerve pain          What we talked about: What I should do:   Your medication dosage being too low    Increase how often you take nicotine (NICORETTE)          What we talked about: What I should do:   The importance of taking your medication as intended    Reminder to take your medication as prescribed for insulin glargine (LANTUS PEN) at 10 units daily          What we talked about: What I should do:                     "

## 2023-05-04 NOTE — LETTER
May 5, 2023  Jenn Markus  [address]   [place] [address]    Dear Ms. Aparicio, [facility]     Thank you for talking with me on May 4, 2023 about your health and medications. As a follow-up to our conversation, I have included two documents:      1. Your Recommended To-Do List has steps you should take to get the best results from your medications.  2. Your Medication List will help you keep track of your medications and how to take them.    If you want to talk about these documents, please call Samy Prasad RPH at phone: [phone], Monday-Friday 8-4:30pm.    I look forward to working with you and your doctors to make sure your medications work well for you.    Sincerely,  Samy Prasad RPH  [facility] Pharmacist, [facility]

## 2023-05-04 NOTE — PATIENT INSTRUCTIONS
Recommendations from today's MTM visit:                                                    MTM (medication therapy management) is a service provided by a clinical pharmacist designed to help you get the most of out of your medicines.   Today we reviewed what your medicines are for, how to know if they are working, that your medicines are safe and how to make your medicine regimen as easy as possible.      1. Please follow Endocrinology's plan from 4/24/23 for your diabetic medication:  PLAN  -Reduce Lantus to 10 units every morning  -For now continue Jardiance and glipizide  -I will send prescription for Ozempic to [facility]: If medication is received, please refrigerate and bring 1 pen to next visit with Nithya Narvaez on 5/25/2023 for follow-up on blood glucose and injection teaching--we will start with 0.25 mg injected weekly for 4 weeks and if tolerating, increase to 0.5 mg weekly.  -When we start Ozempic, we may discontinue glipizide  -Start vitamin B12 500 mcg daily (sent to pharmacy)  -Return for a follow-up visit in [facility], with labs before visit    2. There is a prescription for the Freestyle Marti sensor, with refill for 1 year, at your [facility]. You should call [facility] for a refill.    3. I sent a prescription to your pharmacy for alpha-lipoic acid 200 mg capsule, take 200 mg daily.     4. Please follow Pulmonology's plan from 4/21/23 for your COPD medication:  Recommendations:               - CXR today              - Continue Trelegy (LAMA/LABA/ICS)              - Secretion management:                          - Vest therapy BID                          - Mucomyst neb BID              - Duonebs - increase from TID to QID              - Nightly NIPPV              - Start roflumilast 250 mg daily x1 month followed by 500 mg daily ongoing              - May need addition of chronic prednisone to help manage exacerbations              - COPD home action plan  "renewed: prednisone + doxycycline on hand              - RTC 3 months    5. I sent a prescription for albuterol inhaler, 1 to 2 puffs into the lung every 4 hours as needed.    6. I will message BENOIT Lara CNP, regarding the patient's blood pressure goals. In the meantime, continue to monitor your blood pressure.     7. You may use 1 nicotine lozenges every 1 to 2 hours for a total of 9 lozenges day to control your smoking.     8. I sent a prescription for ceterizine 10 mg by mouth daily to your pharmacy.    Follow-up: Return in about 6 weeks (around 6/15/2023) for Follow up, with me.    It was great speaking with you today.  I value your experience and would be very thankful for your time in providing feedback in our clinic survey. In the next few days, you may receive an email or text message from [employer] with a link to a survey related to your  clinical pharmacist.\"     To schedule another MTM appointment, please call the clinic directly or you may call the MTM scheduling line at [phone] or toll-free at [phone].     My Clinical Pharmacist's contact information:                                                      Please feel free to contact me with any questions or concerns you have.      Samy Prasad, Pharm. D., [facility]  Medication Therapy Management Pharmacist  Direct Voicemail: [phone]     "

## 2023-05-04 NOTE — PROGRESS NOTES
Medication Therapy Management (MTM) Encounter    ASSESSMENT:                            Medication Adherence/Access: See below for considerations    Type 2 Diabetes:  Patient may have misunderstood the plan from Endocrine. Will reiterate Endocrine's plan to patient via [employer].     Diabetes neuropathy: The patient may benefit from restarting Alpha-lipoic acid.    COPD: The patient may have misunderstood the plan from Pulmonology. Will reiterate Pulmonology's plan to patient via [employer]. She may have completed the prednisone and doxycycline regimen as noted in the plan and would need more on hand for the COPD home action plan.    Hypertension/CAD prevention: Blood pressure not at goal <130/80 mmHg. Patient does not think this is for hypertension. Will confirm with BENOIT Lara CNP, regarding the patient's blood pressure goals.    GERD: Patient has an appointment regarding this, I will defer to her PCP.    Smoking Cessation: In progress. The patient is doing a good job of reducing her smoking habit. She will benefit from additional use of nicotine lozenges as needed or use of nicotine inhaler    Allergies: Will send in prescription for cetirizine 10 mg tablet.    Supplements: Stable    Hyperlipidemia: Discussed the risks and benefits of adding a statin to her medication therapy. May even consider replacing the aspirin 81 mg with a statin since statin has the additional benefit for cholesterol control over aspirin. Low dose aspirin is also no longer recommended in the >60 years old population for primary prevention. The patient wants to take some time to decide whether to add on a statin. We can have a deeper conversation about this in the future.    PLAN:                            1. Please follow Endocrinology's plan from 4/24/23 for your diabetic medication:  PLAN  -Reduce Lantus to 10 units every morning  -For now continue Jardiance and glipizide  -I will send prescription for Ozempic to [facility]  pharmacy: If medication is received, please refrigerate and bring 1 pen to next visit with Nithya Narvaez on 5/25/2023 for follow-up on blood glucose and injection teaching--we will start with 0.25 mg injected weekly for 4 weeks and if tolerating, increase to 0.5 mg weekly.  -When we start Ozempic, we may discontinue glipizide  -Start vitamin B12 500 mcg daily (sent to pharmacy)  -Return for a follow-up visit in [facility], with labs before visit    2. There is a prescription for the Freestyle Marti sensor, with refill for 1 year, at your [facility]. You should call [facility] for a refill.    3. I sent a prescription to your pharmacy for alpha-lipoic acid 200 mg capsule, take 200 mg daily.     4. Please follow Pulmonology's plan from 4/21/23 for your COPD medication:  Recommendations:               - CXR today              - Continue Trelegy (LAMA/LABA/ICS)              - Secretion management:                          - Vest therapy BID                          - Mucomyst neb BID              - Duonebs - increase from TID to QID              - Nightly NIPPV              - Start roflumilast 250 mg daily x1 month followed by 500 mg daily ongoing              - May need addition of chronic prednisone to help manage exacerbations              - COPD home action plan renewed: prednisone + doxycycline on hand              - RTC 3 months    5. I sent a prescription for albuterol inhaler, 1 to 2 puffs into the lung every 4 hours as needed.    6. I will message BENOIT Lara CNP, regarding the patient's blood pressure goals. In the meantime, continue to monitor your blood pressure.     7. You may use 1 nicotine lozenges every 1 to 2 hours for a total of 9 lozenges day to control your smoking.     8. I sent a prescription for ceterizine 10 mg by mouth daily to your pharmacy.    Follow-up: No follow-ups on file.    SUBJECTIVE/OBJECTIVE:                          Jenn Aparicio is a 67 year old female  called for a follow-up visit.  Today's visit is a follow-up [facility] visit from 6/1/22 with Earl Mcgrath, [facility] Pharmacist.     Reason for visit: medication therapy management.    Allergies/ADRs: Reviewed in chart  Past Medical History: Reviewed in chart  Tobacco: She reports that she has been smoking cigarettes. She has been smoking an average of .25 packs per day. She has quit using smokeless tobacco.Nicotine/Tobacco Cessation Plan:   Pharmacotherapies : Nicotine patch and Nicotine lozenge  Alcohol: none    Medication Adherence/Access: Patient needs refill of multiple medications.     Type 2 Diabetes:  Currently taking: Jardiance 25 mg daily, Glipizide 10 mg twice a day, Lantus 100 unit/mL 12 units daily. Patient is not experiencing side effects.  Blood sugar monitoring: CGM. Ranges (patient reported): lows of 50 mg/dL.  Symptoms of low blood sugar? Patient did not mention  Symptoms of high blood sugar? none  Eye exam: due  Foot exam: due  Diet/Exercise: did not assess during this visit  Aspirin: Taking 81mg daily for primary prevention   Statin: No   ACEi/ARB: No.     Patient reports often seeing low blood sugar and stated her Lantus was reduced from 14 units to 12 units. She has not picked up the Ozempic 0.25 mg weekly.    Chart review from 4/24/23 visit with Endocrinology shows:  PLAN  -Reduce Lantus to 10 units every morning  -For now continue Jardiance and glipizide  -I will send prescription for Ozempic to [facility] pharmacy: If medication is received, please refrigerate and bring 1 pen to next visit with Nithya Narvaez on 5/25/2023 for follow-up on blood glucose and injection teaching--we will start with 0.25 mg injected weekly for 4 weeks and if tolerating, increase to 0.5 mg weekly.  -When we start Ozempic, we may discontinue glipizide  -Start vitamin B12 500 mcg daily (sent to pharmacy)  -Return for a follow-up visit in [facility], with labs before visit    Urine Albumin:   Lab Results  "  Component Value Date    UMALCR 63.82 (H) 04/17/2023      Lab Results   Component Value Date    A1C 10.5 04/17/2023    A1C 9.4 01/23/2023    A1C 9.9 01/01/2023    A1C 9.8 08/31/2022    A1C 8.4 05/20/2022    A1C 7.0 02/24/2021    A1C 6.8 09/08/2020     Diabetes neuropathy: Currently taking pregabalin 150 mg twice daily and Glucoasamine-chondroitin -400 mg tab daily. Patient reports nerve pain is \"bad, horrible. Doesn't feel like Lyrica is helping.\" She stated she use to be on Alpha-lipoic acid which really help but ran out of the medication and insurance no longer covers it for some reason. She had to pay out of pocket and would not afford it. If possible, she would like to go back on the Alpha-lipoic acid.    COPD: Current medication includes:   Acetylcysteine 10% neb solution - 4 mg 4 times daily  Albuterol inhaler - 1 to 2 puff every 4 hours as needed = ran out. Need refill.  Albuterol 0.083 neb solution - 1 vial 4 times daily  Trelegy Ellipta 200-62.5-25 mcg/inh oral inhaler - 1 puff daily = rinse her mouth after use  Roflumilast - 250 mcg daily for 28 days then 500 mcg daily = have not picked up    Patient ran out of the albuterol inhaler and would like a refill. She understands to rinse her mouth after using the Trelegy. She has yet to receive roflumilast. She stated the Duoneb was discontinue since she was given a new medication. She report completing prednisone therapy. She reports her breathing has gotten better.    Per chart review, from 4/21/23 visit with Pulmonology:  Recommendations:               - CXR today              - Continue Trelegy (LAMA/LABA/ICS)              - Secretion management:                          - Vest therapy BID                          - Mucomyst neb BID              - Duonebs - increase from TID to QID              - Nightly NIPPV              - Start roflumilast 250 mg daily x1 month followed by 500 mg daily ongoing              - May need addition of chronic prednisone " "to help manage exacerbations              - COPD home action plan renewed: prednisone + doxycycline on hand              - RTC 3 months    Hypertension/CAD prevention: Currently taking amlodipine 10 mg daily and aspirin 81 mg.  Patient does not self-monitor blood pressure.  Patient reports the following medication side effects:swelling in ankles. She thought the swelling was due to her diabetic. She reports no bleed event from the aspirin. She stated she was not aware she had high blood pressure. She thought these medications were for prevention only.     BP Readings from Last 3 Encounters:   04/21/23 138/75   03/22/23 132/72   03/16/23 132/74     Pulse Readings from Last 3 Encounters:   04/21/23 94   03/22/23 106   03/16/23 99     GERD: Patient ran out of omeprazole. She reports heartburn is \"not good.\" Patient stated she has an appointment with one of her providers regarding problem with eating and will follow up about the heartburn.    Smoking Cessation: Current therapy include Wellbutrin 300 mg daily, nicotine patch 14 mg daily, and nicotine lozenges 4 mg as needed. Patient report smoking 2 cigarettes a week. She is \"trying to get her health together\" and am \"confident about getting down to 0 cigarettes.\"  2 cig/week. She is open to additional medication therapy and does have nicotine inhalers she hasnt tried.     Allergies: Currently taking Flonase nasal spray 1 spray into both nostrils nightly, Zaditor 1 drop into both eyes daily, and Systane 1 drop into both eyes 4 times daily. She ran out of cetrizine 10 mg daily and would like a refill. These medication controls her allergies.     Supplements: Currently taking Vitamin B 12 500 mcg daily, and Thera-Vit-M tablet daily. No questions or concerns.     Hyperlipidemia: No current statin therapy. Patient feels overwhelm with her current pill burden and does not want to add more.    The 10-year ASCVD risk score (Chun DK, et al., 2019) is: 52.8%    Values used to " calculate the score:      Age: 67 years      Sex: Female      Is Non- : Yes      Diabetic: Yes      Tobacco smoker: Yes      Systolic Blood Pressure: 138 mmHg      Is BP treated: Yes      HDL Cholesterol: 58 mg/dL      Total Cholesterol: 251 mg/dL  Recent Labs   Lab Test 01/27/22  1439 01/11/22  0845   CHOL 251* 224*   HDL 58 55   * 149*   TRIG 127 102       Today's Vitals: There were no vitals taken for this visit.  ----------------      I spent 26 minutes with this patient today. All changes were made via collaborative practice agreement with BENOIT Lara CNP. A copy of the visit note was provided to the patient's provider(s).    A summary of these recommendations was sent via [employer].    Alonso Hoffmann, Doctor of Pharmacy Candidate    Preceptor Cosignature: I have reviewed and verified the student's documentation.    Samy rPasad, Pharm. D., [facility]  Medication Therapy Management Pharmacist  Direct Voicemail: [phone]        Telemedicine Visit Details  Type of service:  Telephone visit  Start Time: 1:33 PM  End Time: 1:59 pm     Medication Therapy Recommendations  Chronic obstructive pulmonary disease, unspecified COPD type (H)    Current Medication: ipratropium - albuterol 0.5 mg/2.5 mg/3 mL (DUONEB) 0.5-2.5 (3) MG/3ML neb solution   Rationale: Does not understand instructions - Adherence - Adherence   Recommendation: Provide Education   Status: Patient Agreed - Adherence/Education         Diabetic neuropathy (H)    Current Medication: pregabalin (LYRICA) 150 MG capsule   Rationale: More effective medication available - Ineffective medication - Effectiveness   Recommendation: Change Medication - ALPHA-LIPOIC ACID (ALTERNATIVE MEDICINE - A'S)   Status: Accepted per CPA         Tobacco abuse    Current Medication: nicotine (NICORETTE) 4 MG lozenge   Rationale: Dose too low - Dosage too low - Effectiveness   Recommendation: Increase Frequency   Status: Accepted - no CPA Needed          Type 2 diabetes mellitus without complication, without long-term current use of insulin (H)    Current Medication: insulin glargine (LANTUS PEN) 100 UNIT/ML pen   Rationale: Does not understand instructions - Adherence - Adherence   Recommendation: Provide Adherence Intervention   Status: Patient Agreed - Adherence/Education

## 2023-05-04 NOTE — Clinical Note
Hello,  I had a good visit with Jenn and helped clear up some confusion. Is your goal for her hypertension <130/80 or <140/90 mmHg?   Also I was discussing with her to replace her aspirin with a statin as she does not want to add new medications, is indicated for a statin, and doesn't have a hard indication for aspirin secondary prevention that I can see.  Thanks!  Samy Prasad, Pharm. D., [facility] Medication Therapy Management Pharmacist Direct Voicemail: [phone]

## 2023-05-09 ENCOUNTER — OFFICE VISIT (OUTPATIENT)
Dept: OPTOMETRY | Facility: CLINIC | Age: 68
End: 2023-05-09
Payer: COMMERCIAL

## 2023-05-09 DIAGNOSIS — H52.4 PRESBYOPIA: ICD-10-CM

## 2023-05-09 DIAGNOSIS — H40.039 ANATOMICAL NARROW ANGLE: ICD-10-CM

## 2023-05-09 DIAGNOSIS — H10.13 ALLERGIC CONJUNCTIVITIS OF BOTH EYES: ICD-10-CM

## 2023-05-09 DIAGNOSIS — E11.9 TYPE 2 DIABETES MELLITUS WITHOUT COMPLICATION, WITHOUT LONG-TERM CURRENT USE OF INSULIN (H): Primary | ICD-10-CM

## 2023-05-09 DIAGNOSIS — H52.03 HYPEROPIA, BILATERAL: ICD-10-CM

## 2023-05-09 DIAGNOSIS — H25.813 COMBINED FORMS OF AGE-RELATED CATARACT OF BOTH EYES: ICD-10-CM

## 2023-05-09 DIAGNOSIS — H04.123 DRY EYE SYNDROME OF BOTH EYES: ICD-10-CM

## 2023-05-09 DIAGNOSIS — H52.223 REGULAR ASTIGMATISM OF BOTH EYES: ICD-10-CM

## 2023-05-09 PROCEDURE — 92015 DETERMINE REFRACTIVE STATE: CPT | Performed by: OPTOMETRIST

## 2023-05-09 PROCEDURE — 92014 COMPRE OPH EXAM EST PT 1/>: CPT | Performed by: OPTOMETRIST

## 2023-05-09 RX ORDER — OLOPATADINE HYDROCHLORIDE 2 MG/ML
1 SOLUTION/ DROPS OPHTHALMIC DAILY
Qty: 2.5 ML | Refills: 11 | Status: SHIPPED | OUTPATIENT
Start: 2023-05-09 | End: 2024-05-08

## 2023-05-09 RX ORDER — POLYETHYLENE GLYCOL 400 AND PROPYLENE GLYCOL 4; 3 MG/ML; MG/ML
1 SOLUTION/ DROPS OPHTHALMIC 4 TIMES DAILY
Qty: 5 ML | Refills: 11 | Status: SHIPPED | OUTPATIENT
Start: 2023-05-09 | End: 2023-10-12

## 2023-05-09 ASSESSMENT — TONOMETRY
IOP_METHOD: TONOPEN
OS_IOP_MMHG: 18
OD_IOP_MMHG: 18

## 2023-05-09 ASSESSMENT — CONF VISUAL FIELD
OD_NORMAL: 1
OS_NORMAL: 1
OD_INFERIOR_NASAL_RESTRICTION: 0
OD_SUPERIOR_TEMPORAL_RESTRICTION: 0
OD_SUPERIOR_NASAL_RESTRICTION: 0
OS_SUPERIOR_TEMPORAL_RESTRICTION: 0
OD_INFERIOR_TEMPORAL_RESTRICTION: 0
OS_INFERIOR_TEMPORAL_RESTRICTION: 0
OS_SUPERIOR_NASAL_RESTRICTION: 0
OS_INFERIOR_NASAL_RESTRICTION: 0

## 2023-05-09 ASSESSMENT — SLIT LAMP EXAM - LIDS
COMMENTS: NORMAL
COMMENTS: NORMAL

## 2023-05-09 ASSESSMENT — VISUAL ACUITY
OD_PH_CC+: -1
OD_SC: 20/150
METHOD: SNELLEN - LINEAR
OD_CC: 20/20-2
OD_CC: 20/150
OS_SC: 20/70
CORRECTION_TYPE: GLASSES
OS_PH_CC: 20/50
OS_SC+: -1
OS_CC: 20/100
OS_CC: 20/50-1
OD_PH_CC: 20/30
OS_CC+: -1

## 2023-05-09 ASSESSMENT — KERATOMETRY
OD_K1POWER_DIOPTERS: 42.25
OS_AXISANGLE2_DEGREES: 041
OD_AXISANGLE_DEGREES: 015
OS_K1POWER_DIOPTERS: 42.25
OD_K2POWER_DIOPTERS: 43.25
OS_AXISANGLE_DEGREES: 131
OD_AXISANGLE2_DEGREES: 105
OS_K2POWER_DIOPTERS: 43.50

## 2023-05-09 ASSESSMENT — REFRACTION_WEARINGRX
SPECS_TYPE: BIFOCAL
OD_AXIS: 179
OS_AXIS: 153
OD_ADD: +2.50
OS_ADD: +2.50
OS_SPHERE: -0.25
OD_SPHERE: +0.25
OD_CYLINDER: +0.75
OS_CYLINDER: +1.25

## 2023-05-09 ASSESSMENT — REFRACTION_MANIFEST
OD_AXIS: 175
OS_SPHERE: -2.00
OS_ADD: +2.50
OS_AXIS: 155
OD_SPHERE: -1.75
METHOD_AUTOREFRACTION: 1
OD_ADD: +2.50
OS_CYLINDER: +2.00
OD_CYLINDER: +1.25

## 2023-05-09 ASSESSMENT — EXTERNAL EXAM - LEFT EYE: OS_EXAM: NORMAL

## 2023-05-09 ASSESSMENT — CUP TO DISC RATIO
OD_RATIO: 0.45
OS_RATIO: 0.4

## 2023-05-09 ASSESSMENT — EXTERNAL EXAM - RIGHT EYE: OD_EXAM: NORMAL

## 2023-05-09 NOTE — PROGRESS NOTES
Chief Complaint   Patient presents with     Diabetic Eye Exam        Chief Complaint(s) and History of Present Illness(es)     Diabetic Eye Exam            Vision: fluctuates with blood sugars    Diabetes Type: Type 2, on insulin and taking oral medications    Duration: 20 years    Blood Sugars: is uncontrolled               Lab Results   Component Value Date    A1C 10.5 04/17/2023    A1C 9.4 01/23/2023    A1C 9.9 01/01/2023    A1C 9.8 08/31/2022    A1C 8.4 05/20/2022    A1C 7.0 02/24/2021    A1C 6.8 09/08/2020     Last Eye Exam: 1-  Dilated Previously: Yes    What are you currently using to see?  glasses    Distance Vision Acuity: Noticed gradual change in both eyes    Near Vision Acuity: Not satisfied     Eye Comfort: itchy  Do you use eye drops? : Yes: zaditor and systane   Occupation or Hobbies: none    10/11/2017  [employer]  Anatomical narrow angle  Narrow angles, not occludable by gonio today.   -Will continue to monitor-    María Ferreira Optometric Assistant, A.B.O.C.     Medical, surgical and family histories reviewed and updated 5/9/2023.       OBJECTIVE: See Ophthalmology exam    ASSESSMENT:    ICD-10-CM    1. Type 2 diabetes mellitus without complication, without long-term current use of insulin (H)  E11.9 EYE EXAM (SIMPLE-NONBILLABLE)    Negative diabetic retinopathy both eyes      2. Combined forms of age-related cataract of both eyes  H25.813 EYE EXAM (SIMPLE-NONBILLABLE)     Adult Eye  Referral      3. Dry eye syndrome of both eyes  H04.123 EYE EXAM (SIMPLE-NONBILLABLE)     polyethylene glycol-propylene glycol (SYSTANE) 0.4-0.3 % SOLN ophthalmic solution      4. Allergic conjunctivitis of both eyes  H10.13 EYE EXAM (SIMPLE-NONBILLABLE)     olopatadine (PATADAY) 0.2 % ophthalmic solution      5. Anatomical narrow angle  H40.039 Adult Eye  Referral      6. Presbyopia  H52.4 REFRACTION      7. Hyperopia, bilateral  H52.03 REFRACTION      8. Regular astigmatism of both  eyes  H52.223 REFRACTION          PLAN:    Jenn Aparicio aware  eye exam results will be sent to [facility] Nettles  Patient Instructions   There are not any signs of the diabetes affecting the eyes today.  It is important that you get your eyes dilated once yearly and keep good control of your diabetes.    Referral for cataract evaluation and narrow angle evaluation.    Systane Ultra 1 drop both eyes 2-4 x day.     Pataday to be used once daily for itchy eyes.  Use as needed.  Or-Patanol, Zaditor, or Optivar- ok substitute- 1 drop both eyes 2 x day as needed.     Ok to fill prescription to get by until cataract surgery.    Recommend diabetic annual eye exams.    Joe Landers, OD

## 2023-05-09 NOTE — PATIENT INSTRUCTIONS
There are not any signs of the diabetes affecting the eyes today.  It is important that you get your eyes dilated once yearly and keep good control of your diabetes.    Referral for cataract evaluation and narrow angle evaluation.    What are the symptoms of NAG? (Narrow Angle Glaucoma)  Between attacks the eye pressure is normal and there are no symptoms. During the attack there are often eye pain, nausea and sometimes vomiting. The eye may be red, vision may be blurry and patients may see halos around the lights.     What medicines should patients with Narrow Angle Glaucoma avoid?  Patients with Narrow Angle Glaucoma should avoid cold remedies which contain Pseudoephedrine, Phenylephrine or Jose-Synephrine; anti-histaminics Chlorpheniramine, Diphenhydramine or Benadryl and overactive bladder remedies such as Detrol. These remedies often carry a warning telling you not to use them if you have glaucoma. If your Narrow Angle Glaucoma has been treated with laser, these medicines become safe for you to use. The above medicines generally do not cause problems to patients who have POAG type glaucoma.      Systane Ultra 1 drop both eyes 2-4 x day.     Pataday to be used once daily for itchy eyes.  Use as needed.  Or-Patanol, Zaditor, or Optivar- ok substitute- 1 drop both eyes 2 x day as needed.     Ok to fill prescription to get by until cataract surgery.    Recommend diabetic annual eye exams.    Joe Landers, OD    The affects of the dilating drops last for 4- 6 hours.  You will be more sensitive to light and vision will be blurry up close.  Do not drive if you do not feel comfortable.  Mydriatic sunglasses were given if needed.    Patient Education   Diabetes weakens the blood vessels all over the body, including the eyes. Damage to the blood vessels in the eyes can cause swelling or bleeding into part of the eye (called the retina). This is called diabetic retinopathy (AGUILA-tin-AH-puh-thee). If not treated, this disease  can cause vision loss or blindness.   Symptoms may include blurred or distorted vision, but many people have no symptoms. It's important to see your eye doctor regularly to check for problems.   Early treatment and good control can help protect your vision. Here are the things you can do to help prevent vision loss:      1. Keep your blood sugar levels under tight control.      2. Bring high blood pressure under control.      3. No smoking.      4. Have yearly dilated eye exams.       Optometry Providers       Clinic Locations                                 Telephone Number   Dr. Courtney [place]   [place]  [place]  [place] [phone]     [facility]:                [facility] Hours:       [facility] Hours:   [address]   [address] [address]      [address]  [place] [address]   [place] [address]    [place] [address]  Phone: [phone]                    Phone: [phone]     Phone: [phone]                      Monday 8:00-6:00                          Monday 8:00-6:00                          Monday 8:00-6:00              Tuesday 8:00-6:00                          Tuesday 8:00-6:00                          Tuesday 8:00-6:00              Wednesday 8:00-6:00                  Wednesday 8:00-6:00                   Wednesday 8:00-6:00      Thursday 8:00-6:00                        Thursday 8:00-6:00                         Thursday 8:00-6:00            Friday 8:00-5:00                              Friday 8:00-5:00                              Friday 8:00-5:00    [facility] Hours:   [address] [place] [id number] [phone]    Monday 9:00-6:00  Tuesday 9:00-6:00  Wednesday 9:00-6:00  Thursday 9:00-6:00  Friday 9:00-5:00  As always, Thank you for trusting us with your health care  needs!

## 2023-05-09 NOTE — LETTER
5/9/2023         RE: Jenn Aparicio  [address]  [place] [address]        Dear Colleague,    Thank you for referring your patient, Jenn Aparicio, to the [facility]. Please see a copy of my visit note below.    Chief Complaint   Patient presents with     Diabetic Eye Exam        Chief Complaint(s) and History of Present Illness(es)     Diabetic Eye Exam            Vision: fluctuates with blood sugars    Diabetes Type: Type 2, on insulin and taking oral medications    Duration: 20 years    Blood Sugars: is uncontrolled               Lab Results   Component Value Date    A1C 10.5 04/17/2023    A1C 9.4 01/23/2023    A1C 9.9 01/01/2023    A1C 9.8 08/31/2022    A1C 8.4 05/20/2022    A1C 7.0 02/24/2021    A1C 6.8 09/08/2020     Last Eye Exam: 1-  Dilated Previously: Yes    What are you currently using to see?  glasses    Distance Vision Acuity: Noticed gradual change in both eyes    Near Vision Acuity: Not satisfied     Eye Comfort: itchy  Do you use eye drops? : Yes: zaditor and systane   Occupation or Hobbies: none    10/11/2017  [employer]  Anatomical narrow angle  Narrow angles, not occludable by gonio today.   -Will continue to monitor-    María Ferreira Optometric Assistant, A.B.O.C.     Medical, surgical and family histories reviewed and updated 5/9/2023.       OBJECTIVE: See Ophthalmology exam    ASSESSMENT:    ICD-10-CM    1. Type 2 diabetes mellitus without complication, without long-term current use of insulin (H)  E11.9 EYE EXAM (SIMPLE-NONBILLABLE)    Negative diabetic retinopathy both eyes      2. Combined forms of age-related cataract of both eyes  H25.813 EYE EXAM (SIMPLE-NONBILLABLE)     Adult Eye  Referral      3. Dry eye syndrome of both eyes  H04.123 EYE EXAM (SIMPLE-NONBILLABLE)     polyethylene glycol-propylene glycol (SYSTANE) 0.4-0.3 % SOLN ophthalmic solution      4. Allergic conjunctivitis of both eyes  H10.13 EYE EXAM  (SIMPLE-NONBILLABLE)     olopatadine (PATADAY) 0.2 % ophthalmic solution      5. Anatomical narrow angle  H40.039 Adult Eye  Referral      6. Presbyopia  H52.4 REFRACTION      7. Hyperopia, bilateral  H52.03 REFRACTION      8. Regular astigmatism of both eyes  H52.223 REFRACTION          PLAN:    Jenn Aparicio aware  eye exam results will be sent to [facility] Nettles  Patient Instructions   There are not any signs of the diabetes affecting the eyes today.  It is important that you get your eyes dilated once yearly and keep good control of your diabetes.    Referral for cataract evaluation and narrow angle evaluation.    Systane Ultra 1 drop both eyes 2-4 x day.     Pataday to be used once daily for itchy eyes.  Use as needed.  Or-Patanol, Zaditor, or Optivar- ok substitute- 1 drop both eyes 2 x day as needed.     Ok to fill prescription to get by until cataract surgery.    Recommend diabetic annual eye exams.    Joe Landers, JUAN ANTONIO                         Again, thank you for allowing me to participate in the care of your patient.        Sincerely,        Joe Landers, OD

## 2023-05-10 DIAGNOSIS — J44.9 CHRONIC OBSTRUCTIVE PULMONARY DISEASE, UNSPECIFIED COPD TYPE (H): ICD-10-CM

## 2023-05-11 RX ORDER — ALBUTEROL SULFATE 90 UG/1
AEROSOL, METERED RESPIRATORY (INHALATION)
Qty: 17 G | Refills: 4 | OUTPATIENT
Start: 2023-05-11

## 2023-05-15 ENCOUNTER — DOCUMENTATION ONLY (OUTPATIENT)
Facility: CLINIC | Age: 68
End: 2023-05-15

## 2023-05-15 ENCOUNTER — VIRTUAL VISIT (OUTPATIENT)
Dept: GASTROENTEROLOGY | Facility: CLINIC | Age: 68
End: 2023-05-15
Payer: COMMERCIAL

## 2023-05-15 VITALS — BODY MASS INDEX: 31.82 KG/M2 | HEIGHT: 66 IN | WEIGHT: 198 LBS

## 2023-05-15 DIAGNOSIS — K22.4 ESOPHAGEAL DYSMOTILITY: Primary | ICD-10-CM

## 2023-05-15 PROCEDURE — 99214 OFFICE O/P EST MOD 30 MIN: CPT | Mod: VID | Performed by: INTERNAL MEDICINE

## 2023-05-15 RX ORDER — INSULIN GLARGINE 100 [IU]/ML
10 INJECTION, SOLUTION SUBCUTANEOUS DAILY
COMMUNITY
Start: 2023-04-11 | End: 2023-06-15

## 2023-05-15 ASSESSMENT — PAIN SCALES - GENERAL: PAINLEVEL: NO PAIN (0)

## 2023-05-15 NOTE — PATIENT INSTRUCTIONS
Recommendations:   HREM  EGD with empiric dilation     Please call with any questions or concerns regarding your clinic visit today.     It is a pleasure being involved in your health care.     Contacts post-consultation depending on your need:     Schedule Clinic Appointments           [phone]# [phone]   M-F 7:30 - 5 pm     For urgent/emergent questions after business hours, you may reach the on-call GI Fellow by contacting the [facility]  at [phone].     How do I schedule labs, imaging studies, or procedures that were ordered in clinic today?     Labs:   To schedule lab appointment at the [facility] and [facility] Center, use my chart or call [phone]. If you have a [facility] lab closer to home where you are regularly seen you can give them a call.     Procedures:   If a colonoscopy, upper endoscopy, breath test, esophageal manometry, or pH impedence was ordered today, our endoscopy team will call you to schedule this. If you have not heard from our endoscopy team within a week, please call [phone] to schedule.     Imaging Studies:   If you were scheduled for a CT scan, X-ray, MRI, ultrasound, HIDA scan or other imaging study, please call [phone] to have this scheduled.     Referral:   If a referral to another specialty was ordered, expect a phone call or follow instructions above. If you have not heard from anyone regarding your referral in a week, please call our clinic to check the status.     How to I schedule a follow-up visit?   If you did not schedule a follow-up visit today, please call [phone] to schedule a follow-up office visit.

## 2023-05-15 NOTE — NURSING NOTE
Is the patient currently in the state of [place]? YES    Visit mode:VIDEO    If the visit is dropped, the patient can be reconnected by: VIDEO VISIT: Text to cell phone: [phone]    Will anyone else be joining the visit? YES: How would they like to receive their invitation? Other e-mail: Speech Therapist is present      How would you like to obtain your AVS? MyChart    Are changes needed to the allergy or medication list? NO    Reason for visit: Video Visit

## 2023-05-15 NOTE — PROGRESS NOTES
"HOME VISIT APPT DATE: 3/24/2023    I ARRIVED AT PATIENT'S HOME AND ONCE AGAIN THE PEOPLE THAT MANAGE THE BUILDING ARE NOT AT THE FRONT TO HELP THE PATIENT WITH OPENING THE DOORS. I DID HAVE A TALK WITH 1 OF THE LADIES I'VE SEEN THAT MANAGES THE BUILDING ON HOW WE CAN MAKE IT MORE EFFICIENT FOR JOHN WHEN IT COMES TO BUZZING ME IN THE BUILDING. THE PLAN IS THAT I REMIND JOHN WHEN WE SET UP AN APPT TO CALL THE  PEOPLE AND TELL THEM ABOUT OUR APPT. THE MANAGER STATED THAT IS THE MOST EFFICIENT WAY FOR THEM TO KNOW I'M THERE. I BROUGHT JOHN A TRILOGY STAND THIS TIME ALONG WITH A BUNCH OF MASKS. JOHN DID NOT LIKE THE \"FOR HER\" MASKS A TON BECAUSE THE HEADGEAR SEEMED TIGHT. JOHN IS VERY INTERESETED IN OUR PULSE DOSE O2, BUT SHE IS CURRENTLY NEEDING 4L/M O2. I TOLD HER IF SHE IS ABLE TO WEAN DOWN TO 3L/M I CAN TEST HER ON A PULSE DOSE, BUT CHANCES ARE SHE WON'T QUALIFY.    DX: CRF 2ND TO COPD, LUNG CA AND RLL LOBECTOMY  VITALS:  BREATH SOUNDS: COURSE AND DIMINISHED  SECRETIONS: NO COUGH OR SECRETIONS  LOC: ALERT AND ORIENTED    SETUP DATE: 2/24/2023  DEVICE TYPE: TRILOGY  SETTINGS (include mode): AVAPS AE, , RR 14, EPAP 5-12, PS 4-30  COMFORT SETTINGS: IT 1.5SEC (0.3-3.0) MAX PRESSURE 40  INTERFACE: AIRTOUCH AND AIRFIT F20 MEDIUM  CIRCUIT/HUMIDITY: HEATED  ALARMS: ALARMS OFF  O2 BLEED IN (YES/NO): YES, 4L/M    VENTILATOR INSPECTION DONE: YES SETTINGS CHECK: YES  ALARM CHECK: YES  VENTILATOR SETTINGS VERIFIED: YES  CIRCUIT CHECK: YES OR NO CHANGED: YES CIRCUIT SUPPLIES GIVEN: YES  MASK: AIRTOUCH F20 TYPE: FFM MEDIUM CONDITION: GOOD REPLACED: YES  FILTERS: EXTERNAL CHANGED W/CIRCUIT YES LEFT WITH SUPPLIES YES INLET FILTER CHECKED OR CHANGED  NA  OXYGEN EQUIPMENT REVIEWED IF APPLICABLE: YES, PATIENT CALLED THE [employer] ABOUT O2 LEAKING SO THEY WERE ON THEIR WAY WHEN I ARRIVED AT [facility] APT.    MD NAME/PHONE/FAX: BRYAN NAVA  CAREGIVER NAME/PHONE: [phone]                  BRAEDEN" [facility]  [phone]

## 2023-05-15 NOTE — PROGRESS NOTES
Virtual Visit Details    Type of service:  Video Visit   Video Start Time: 1020  Video End Time:10:36 AM    Originating Location (pt. Location): Assisted Living  Distant Location (provider location):  On-site  Platform used for Video Visit: [facility]       GASTROENTEROLOGY  Return Visit            Provider:   Travis Briseno MD    PCP:   Mitzi Nettles    Reason for return visit:  Dysphagia     Assessment:  Chornic dysphagia with COPD adenoca of lung, post radiation.     Recommendations:   HREM  EGD with empiric dilation     Interim Subjective Notes:   This is a 67 year old with NSCLC, adeno, COPD severe with mucus plugging and 02, DM.    She underwent esophagram, egd, and escalated PPI therapy.   There is no stenosis but GERD and tertiary contractions. Having a very hard time eating.    Living in assited living care.  Very poor connection over [facility].        PAST MEDICAL HISTORY  Lung adenocarcinoma  DM2 with neuropathy  HCV IA, undetectable in 2019, treated  COPD. O2 dependent since late 2018. PFT 11/26/19. FEV1 0.64 (30%), FVC 1.69 (63%), DLCOunc 9.8 (38%).  HTN  H/o ITP  H/o disk herniation  Ankle surgery  Median neuropathy R  H/o sessile colon polyps 2014, h/o adenomas before that. Colonoscopy 2/7/18 @ [facility]. Sessile serated adenoma x 1, tubular adenoma x 3  H/o chronic LBP  Dyslipidemia  Cataracts         Current Outpatient Medications   Medication Sig Dispense Refill     acetylcysteine (MUCOMYST) 10 % nebulizer solution Inhale 4 mLs into the lungs 4 times daily 480 mL 0     albuterol (PROAIR HFA/PROVENTIL HFA/VENTOLIN HFA) 108 (90 Base) MCG/ACT inhaler USE 1 OR 2 INHALATIONS EVERY 4 HOURS AS NEEDED 17 g 3     albuterol (PROVENTIL) (2.5 MG/3ML) 0.083% neb solution Take 1 vial (2.5 mg) by nebulization 4 times daily 360 mL 0     Alpha Lipoic Acid 200 MG CAPS Take 1 capsule by mouth daily 90 capsule 3     amLODIPine (NORVASC) 10 MG tablet Take 1 tablet (10 mg) by mouth daily 90 tablet 3     aspirin 81 MG EC tablet  Take 1 tablet (81 mg) by mouth daily 90 tablet 3     buPROPion (WELLBUTRIN XL) 300 MG 24 hr tablet Take 1 tablet (300 mg) by mouth every morning 90 tablet 3     cetirizine (ZYRTEC) 10 MG tablet Take 1 tablet (10 mg) by mouth daily 30 tablet 10     Continuous Blood Gluc Sensor (FREESTYLE GIOVANNI 2 SENSOR) MISC 1 each every 14 days For use with Freestyle Giovanni 2  for continuous monitioring of blood glucose levels. Replace sensor every 14 days. 2 each 11     cyanocobalamin (VITAMIN B-12) 500 MCG tablet Take 1 tablet (500 mcg) by mouth daily 90 tablet 1     doxycycline hyclate (VIBRA-TABS) 100 MG tablet Take 1 tablet (100 mg) by mouth 2 times daily 10 tablet 1     empagliflozin (JARDIANCE) 25 MG TABS tablet Take 1 tablet (25 mg) by mouth daily 90 tablet 1     fluticasone (FLONASE) 50 MCG/ACT nasal spray Spray 1 spray into both nostrils daily Use at night before bed 15.8 mL 7     Fluticasone-Umeclidin-Vilanterol (TRELEGY ELLIPTA) 200-62.5-25 MCG/INH oral inhaler Inhale 1 puff into the lungs daily 28 each 5     glipiZIDE (GLUCOTROL) 5 MG tablet Take 2 tablets (10mg) in the AM and 2 tablet (5mg) at PM daily. 90 tablet 0     GLUCOSAMINE-CHONDROITIN -400 MG tablet TAKE 1 TABLET DAILY 90 tablet 3     insulin glargine (LANTUS PEN) 100 UNIT/ML pen Inject 14 Units Subcutaneous At Bedtime 15 mL 11     insulin glargine (LANTUS SOLOSTAR) 100 UNIT/ML pen Inject 10 Units Subcutaneous daily       insulin pen needle (BD MARCIO U/F) 32G X 4 MM miscellaneous Use 1 pen needle daily or as directed. 100 each 1     ipratropium - albuterol 0.5 mg/2.5 mg/3 mL (DUONEB) 0.5-2.5 (3) MG/3ML neb solution Take 1 vial by nebulization every 6 hours as needed for shortness of breath, wheezing or cough       ketotifen (ZADITOR) 0.025 % ophthalmic solution Place 1 drop into both eyes daily       multivitamin w/minerals (THERA-VIT-M) tablet Take 1 tablet by mouth daily 30 tablet 11     nicotine (NICODERM CQ) 14 MG/24HR 24 hr patch Place 1 patch  onto the skin every 24 hours       nicotine (NICORETTE) 4 MG lozenge Place 1 lozenge (4 mg) inside cheek every hour as needed for smoking cessation 100 lozenge 0     nicotine (NICOTROL) 10 MG inhaler Use 1 cartridge as needed for urge to smoke by puffing over course of 20min.  Use 6-16 cart/day; reduce number of cart/day over 6-12 weeks. 160 each 0     olopatadine (PATADAY) 0.2 % ophthalmic solution Place 0.05 mLs (1 drop) into both eyes daily 2.5 mL 11     omega-3 acid ethyl esters (LOVAZA) 1 g capsule Take 2 capsules (2 g) by mouth 2 times daily 360 capsule 3     omeprazole (PRILOSEC) 20 MG DR capsule Take 1 capsule (20 mg) by mouth 2 times daily 180 capsule 3     polyethylene glycol-propylene glycol (SYSTANE) 0.4-0.3 % SOLN ophthalmic solution Place 1 drop into both eyes 4 times daily 5 mL 11     polyethylene glycol-propylene glycol (SYSTANE) 0.4-0.3 % SOLN ophthalmic solution Place 1 drop into both eyes 4 times daily 5 mL 11     predniSONE (DELTASONE) 20 MG tablet Take 2 tablets (40 mg) by mouth daily 10 tablet 3     pregabalin (LYRICA) 150 MG capsule Take 1 capsule (150 mg) by mouth 2 times daily 60 capsule 3     roflumilast (DALIRESP) 250 MCG TABS tablet Take 1 tablet (250 mcg) by mouth daily for 28 days, THEN 2 tablets (500 mcg) daily for 340 days. 708 tablet 0     semaglutide (OZEMPIC) 2 MG/3ML pen Start 0.25 mg injected weekly for 4 weeks and if tolerating, increase to 0.5 mg weekly. 9 mL 1     STATIN NOT PRESCRIBED (INTENTIONAL) Please choose reason not prescribed from choices below.         Past Surgical History:   Procedure Laterality Date     2/8/16                R thoracotomy, RLL lobectomy (Dr. Cunha). Adenocarcinoma, 1.1 cm, assoicated with atypical adenomatous hyperplasia Right 02/08/2016    R thoracotomy, RLL lobectomy (Dr. Cunha). Adenocarcinoma, 1.1 cm, assoicated with atypical adenomatous hyperplasia     ESOPHAGOSCOPY, GASTROSCOPY, DUODENOSCOPY (EGD), COMBINED N/A 8/16/2022    Procedure:  "ESOPHAGOGASTRODUODENOSCOPY (EGD);  Surgeon: Travis Briseno MD;  Location:  GI     ORTHOPEDIC SURGERY         Past Medical History:   Diagnosis Date     Adenocarcinoma, lung (H)      Asthma      Ectopic pregnancy      Esophageal reflux      Pulmonary emphysema (H)     Very severe FEV1<30% predicted     Type II diabetes mellitus (H)        Family History   Problem Relation Age of Onset     Thyroid Disease Mother      Cerebrovascular Disease Mother      Hypertension Mother      Hypertension Father      Glaucoma Father      Diabetes Brother      Cancer Brother      Diabetes Brother      Cancer Brother      Diabetes Brother      Cancer Sister      Lung Cancer Sister      Depression Daughter      Alcohol/Drug Other         self     Diabetes Other         self     Thyroid Disease Other         self     Asthma Other         self        reports that she has quit smoking. Her smoking use included cigarettes. She smoked an average of .25 packs per day. She has never used smokeless tobacco. She reports current alcohol use. She reports that she does not use drugs.         Physical Exam:   Ht 1.676 m (5' 6\")   Wt 89.8 kg (198 lb)   BMI 31.96 kg/m    Wt:   Wt Readings from Last 2 Encounters:   05/15/23 89.8 kg (198 lb)   04/21/23 89.8 kg (198 lb)      Constitutional: cooperative, pleasant, not dyspneic/diaphoretic, no acute distress       Wt Readings from Last 2 Encounters:   05/15/23 89.8 kg (198 lb)   04/21/23 89.8 kg (198 lb)       "

## 2023-05-18 ENCOUNTER — TELEPHONE (OUTPATIENT)
Dept: ENDOCRINOLOGY | Facility: CLINIC | Age: 68
End: 2023-05-18
Payer: COMMERCIAL

## 2023-05-18 NOTE — TELEPHONE ENCOUNTER
[facility] Call Center    Phone Message    May a detailed message be left on voicemail: yes     Reason for Call: Other: Patient's home care nurse Radha calling regarding changes to patient's insulin call home care provider Radha [phone] and please fax an updated med list as well [phone]     Action Taken: Other: ENDO    Travel Screening: Not Applicable

## 2023-05-18 NOTE — TELEPHONE ENCOUNTER
Spoke to Radha barrett was sent in to [facility] on 4/24/23. They will contact [facility].     Will fax over AVS from 4/24/23 to fax provided.

## 2023-05-19 ENCOUNTER — TELEPHONE (OUTPATIENT)
Dept: PULMONOLOGY | Facility: CLINIC | Age: 68
End: 2023-05-19
Payer: COMMERCIAL

## 2023-05-19 NOTE — TELEPHONE ENCOUNTER
Patient Contacted for the patient to call back and schedule the following:    Appointment type: RTN  Provider: Kirt   Return date: 08/09/2023  Specialty phone number: [phone]  Additional appointment(s) needed: NA  Additonal Notes: Rescheduled from 07/26/2023

## 2023-05-21 DIAGNOSIS — E11.9 TYPE 2 DIABETES MELLITUS WITHOUT COMPLICATION, WITHOUT LONG-TERM CURRENT USE OF INSULIN (H): ICD-10-CM

## 2023-05-21 DIAGNOSIS — I10 ESSENTIAL HYPERTENSION: ICD-10-CM

## 2023-05-22 ENCOUNTER — TELEPHONE (OUTPATIENT)
Dept: INTERNAL MEDICINE | Facility: CLINIC | Age: 68
End: 2023-05-22
Payer: COMMERCIAL

## 2023-05-22 NOTE — TELEPHONE ENCOUNTER
[facility] Call Center    Phone Message    May a detailed message be left on voicemail: yes     Reason for Call: Other: Patient Requesting a prescription order be placed, referral for a walker and lift chair, [employer] phone [phone], please call patient to discuss further. Thank you     Action Taken: Message routed to:  [facility] pcc    Travel Screening: Not Applicable

## 2023-05-23 NOTE — TELEPHONE ENCOUNTER
DME orders request require face-to-face appointment with provider for an assessment to determine medical necessity need of medical equipment.       Brodie Negron CMA ([facility]) at 6:22 AM on 5/23/2023

## 2023-05-24 ENCOUNTER — APPOINTMENT (OUTPATIENT)
Dept: OPTOMETRY | Facility: CLINIC | Age: 68
End: 2023-05-24
Payer: COMMERCIAL

## 2023-05-24 PROCEDURE — 92341 FIT SPECTACLES BIFOCAL: CPT | Performed by: OPTOMETRIST

## 2023-05-24 RX ORDER — ASPIRIN 81 MG/1
TABLET, COATED ORAL
Qty: 90 TABLET | Refills: 2 | Status: SHIPPED | OUTPATIENT
Start: 2023-05-24 | End: 2023-10-12

## 2023-05-24 NOTE — TELEPHONE ENCOUNTER
ASPIRIN EC TABS 81MG    Office Visit    3/16/2023  [facility] [place]   Mitzi Nettles, APRN CNP  Internal Medicine         Warnings Override History for aspirin 81 MG EC tablet [[id number]]    Overridden by Gab Walden MD on Feb 24, 2023 9:30 AM   Allergy/Contraindication   1. ASPIRIN [Level: Ingredient Match]      Overridden by Edy Nash MD on Feb 7, 2023 3:55 PM   Allergy/Contraindication   1. ASPIRIN [Level: Ingredient Match]

## 2023-05-25 ENCOUNTER — VIRTUAL VISIT (OUTPATIENT)
Dept: EDUCATION SERVICES | Facility: CLINIC | Age: 68
End: 2023-05-25
Payer: COMMERCIAL

## 2023-05-25 ENCOUNTER — ONCOLOGY VISIT (OUTPATIENT)
Dept: ONCOLOGY | Facility: CLINIC | Age: 68
End: 2023-05-25
Attending: INTERNAL MEDICINE
Payer: COMMERCIAL

## 2023-05-25 ENCOUNTER — LAB (OUTPATIENT)
Dept: LAB | Facility: CLINIC | Age: 68
End: 2023-05-25
Attending: INTERNAL MEDICINE
Payer: COMMERCIAL

## 2023-05-25 ENCOUNTER — ANCILLARY PROCEDURE (OUTPATIENT)
Dept: CT IMAGING | Facility: CLINIC | Age: 68
End: 2023-05-25
Attending: INTERNAL MEDICINE
Payer: COMMERCIAL

## 2023-05-25 VITALS
BODY MASS INDEX: 33.49 KG/M2 | HEART RATE: 85 BPM | SYSTOLIC BLOOD PRESSURE: 134 MMHG | DIASTOLIC BLOOD PRESSURE: 64 MMHG | TEMPERATURE: 97.7 F | WEIGHT: 207.5 LBS | OXYGEN SATURATION: 94 % | RESPIRATION RATE: 16 BRPM

## 2023-05-25 DIAGNOSIS — C34.31 MALIGNANT NEOPLASM OF LOWER LOBE OF RIGHT LUNG (H): Primary | ICD-10-CM

## 2023-05-25 DIAGNOSIS — C34.31 MALIGNANT NEOPLASM OF LOWER LOBE OF RIGHT LUNG (H): ICD-10-CM

## 2023-05-25 DIAGNOSIS — E11.42 TYPE 2 DIABETES MELLITUS WITH DIABETIC POLYNEUROPATHY, WITHOUT LONG-TERM CURRENT USE OF INSULIN (H): Primary | ICD-10-CM

## 2023-05-25 LAB
ALBUMIN SERPL BCG-MCNC: 4 G/DL (ref 3.5–5.2)
ALP SERPL-CCNC: 89 U/L (ref 35–104)
ALT SERPL W P-5'-P-CCNC: 9 U/L (ref 10–35)
ANION GAP SERPL CALCULATED.3IONS-SCNC: 7 MMOL/L (ref 7–15)
AST SERPL W P-5'-P-CCNC: 14 U/L (ref 10–35)
BASOPHILS # BLD AUTO: 0.1 10E3/UL (ref 0–0.2)
BASOPHILS NFR BLD AUTO: 1 %
BILIRUB SERPL-MCNC: 0.3 MG/DL
BUN SERPL-MCNC: 8.1 MG/DL (ref 8–23)
CALCIUM SERPL-MCNC: 9.1 MG/DL (ref 8.8–10.2)
CHLORIDE SERPL-SCNC: 105 MMOL/L (ref 98–107)
CREAT BLD-MCNC: 0.5 MG/DL (ref 0.5–1)
CREAT SERPL-MCNC: 0.55 MG/DL (ref 0.51–0.95)
DEPRECATED HCO3 PLAS-SCNC: 32 MMOL/L (ref 22–29)
EOSINOPHIL # BLD AUTO: 0.6 10E3/UL (ref 0–0.7)
EOSINOPHIL NFR BLD AUTO: 9 %
ERYTHROCYTE [DISTWIDTH] IN BLOOD BY AUTOMATED COUNT: 12.8 % (ref 10–15)
GFR SERPL CREATININE-BSD FRML MDRD: >60 ML/MIN/1.73M2
GFR SERPL CREATININE-BSD FRML MDRD: >90 ML/MIN/1.73M2
GLUCOSE SERPL-MCNC: 114 MG/DL (ref 70–99)
HCT VFR BLD AUTO: 45 % (ref 35–47)
HGB BLD-MCNC: 13.8 G/DL (ref 11.7–15.7)
IMM GRANULOCYTES # BLD: 0 10E3/UL
IMM GRANULOCYTES NFR BLD: 0 %
LYMPHOCYTES # BLD AUTO: 1.7 10E3/UL (ref 0.8–5.3)
LYMPHOCYTES NFR BLD AUTO: 28 %
MCH RBC QN AUTO: 27.5 PG (ref 26.5–33)
MCHC RBC AUTO-ENTMCNC: 30.7 G/DL (ref 31.5–36.5)
MCV RBC AUTO: 90 FL (ref 78–100)
MONOCYTES # BLD AUTO: 0.5 10E3/UL (ref 0–1.3)
MONOCYTES NFR BLD AUTO: 8 %
NEUTROPHILS # BLD AUTO: 3.3 10E3/UL (ref 1.6–8.3)
NEUTROPHILS NFR BLD AUTO: 54 %
NRBC # BLD AUTO: 0 10E3/UL
NRBC BLD AUTO-RTO: 0 /100
PLATELET # BLD AUTO: 261 10E3/UL (ref 150–450)
POTASSIUM SERPL-SCNC: 4 MMOL/L (ref 3.4–5.3)
PROT SERPL-MCNC: 7 G/DL (ref 6.4–8.3)
RBC # BLD AUTO: 5.02 10E6/UL (ref 3.8–5.2)
SODIUM SERPL-SCNC: 144 MMOL/L (ref 136–145)
WBC # BLD AUTO: 6.1 10E3/UL (ref 4–11)

## 2023-05-25 PROCEDURE — G0108 DIAB MANAGE TRN  PER INDIV: HCPCS | Mod: 95

## 2023-05-25 PROCEDURE — 85025 COMPLETE CBC W/AUTO DIFF WBC: CPT | Performed by: PATHOLOGY

## 2023-05-25 PROCEDURE — 36415 COLL VENOUS BLD VENIPUNCTURE: CPT | Performed by: PATHOLOGY

## 2023-05-25 PROCEDURE — G0463 HOSPITAL OUTPT CLINIC VISIT: HCPCS | Performed by: INTERNAL MEDICINE

## 2023-05-25 PROCEDURE — 99214 OFFICE O/P EST MOD 30 MIN: CPT | Performed by: INTERNAL MEDICINE

## 2023-05-25 PROCEDURE — 74160 CT ABDOMEN W/CONTRAST: CPT | Performed by: RADIOLOGY

## 2023-05-25 PROCEDURE — 82565 ASSAY OF CREATININE: CPT | Mod: 91 | Performed by: PATHOLOGY

## 2023-05-25 PROCEDURE — 80053 COMPREHEN METABOLIC PANEL: CPT | Performed by: PATHOLOGY

## 2023-05-25 PROCEDURE — 71260 CT THORAX DX C+: CPT | Performed by: RADIOLOGY

## 2023-05-25 RX ORDER — IOPAMIDOL 755 MG/ML
109 INJECTION, SOLUTION INTRAVASCULAR ONCE
Status: COMPLETED | OUTPATIENT
Start: 2023-05-25 | End: 2023-05-25

## 2023-05-25 RX ADMIN — IOPAMIDOL 109 ML: 755 INJECTION, SOLUTION INTRAVASCULAR at 09:50

## 2023-05-25 ASSESSMENT — PAIN SCALES - GENERAL: PAINLEVEL: WORST PAIN (10)

## 2023-05-25 NOTE — PROGRESS NOTES
This video visit was started at 1000 am  This video visit was ended at 11 am  The patient was home  The provider was in office.    DIABETES SELF MANAGEMENT EDUCATION:   Jenn Aparicio presents today for education related to Type 2 diabetes.    She is accompanied by self  Referring Provider: Felisha Castro MD    DIABETES RELATED CONCERNS/COMMENTS: neuropathy in hands and feet  Patient's emotional response to diabetes: expresses readiness to learn and concern for health and well-being    Past Diabetes Education: Yes  Support system: family  Living Situation: lives alone, just moved   Occupation: retired    ASSESSMENT:  Patient Problem List and Medication List reviewed for relevant medical history, current medical status, and diabetes risk factors.    Current Diabetes Management per Patient:  Glipizide 5 mg 2 in am and 2 in pm, Jardiance 25 mg daily, Lantus 10 units daily  At risk for hypoglycemia? Yes , Symptoms: Shaky and Confusion and Frequency:3-4 times per week  BG meter: Marti 2 sensors   Patient glucose self monitoring as follows: has checked with blood sugars  BG results:           Patient's most recent A1C:   Lab Results   Component Value Date    A1C 9.8 08/31/2022    A1C 8.4 05/20/2022    A1C 8.4 01/27/2022    A1C 8.3 01/11/2022    A1C 7.0 02/24/2021    A1C 6.8 09/08/2020    A1C 7.1 12/01/2015     Nutrition:  Breakfast: (9-10a)  Or skips coffee, cream and raw sugar, skips  Snack:  Lunch:()  Snack:   Dinner: (5-8p) [employer] tuna 6 inch, 12 oz Coke, cookie  Snack:     Appetite has been going down over last year, started supplementing with Ensure but has stopped using them    Physical Activity:     minimal exercise, using walking    Diabetes Complications:  Numbness in hands or feet    INTERVENTION:   Education provided today on:  We discussed her current [employer] reports.  She is having frequent episodes of low blood sugar night and day/  She is asked to cut her Lantus to 7    She was started on Ozempic by the  pharmacist.  The medication came to her in an open box that appeared to be damaged.  [facility] is going to send her a new shipment but she does not have it yet.  We talked about how that drug works and she will let us know if she starts having hypoglycemia when on it.      PLAN:  June 29 11 am in person    FOLLOW-UP:  Time Spent: 60 minutes  Encounter Type: Individual    Any diabetes medication dose changes were made via the CDE Protocol and Collaborative Practice Agreement with  Physicians.

## 2023-05-25 NOTE — PATIENT INSTRUCTIONS
Reduce your Lantus to 8 units daily.  Start Ozempic 0.25 mg weekly  Stop Glipizide when you start Ozempic.  Return 6/29 11 am    Nithya Narvaez RN,[address] [address]  Phone: [phone]  [email or web address]

## 2023-05-25 NOTE — NURSING NOTE
"Oncology Rooming Note    May 25, 2023 12:25 PM   Jenn Aparicio is a 67 year old female who presents for:    Chief Complaint   Patient presents with     Oncology Clinic Visit     Return- lung cancer     Initial Vitals: /64 (BP Location: Right arm, Patient Position: Sitting, Cuff Size: Adult Large)   Pulse 85   Temp 97.7  F (36.5  C) (Tympanic)   Resp 16   Wt 94.1 kg (207 lb 8 oz)   SpO2 94%   BMI 33.49 kg/m   Estimated body mass index is 33.49 kg/m  as calculated from the following:    Height as of 5/15/23: 1.676 m (5' 6\").    Weight as of this encounter: 94.1 kg (207 lb 8 oz). Body surface area is 2.09 meters squared.  Worst Pain (10) Comment: Data Unavailable   No LMP recorded. Patient is postmenopausal.  Allergies reviewed: Yes  Medications reviewed: Yes    Medications: Medication refills not needed today.  Pharmacy name entered into EPIC:    [facility] - [place], [place] - [address] [id number] - [place], [place] - [address] AT [facility] [address]  [facility] - [facility] [place] - [facility]    Clinical concerns: The patient has been having spontaneous bruising on her upper and lower extremities.      Melissa Gordon            "

## 2023-05-25 NOTE — LETTER
5/25/2023         RE: Jenn Aparicio  [address]  [place] [address]        Dear Colleague,    Thank you for referring your patient, Jenn Aparicio, to the [facility]. Please see a copy of my visit note below.       [facility]    PATIENT NAME: Jenn Aparicio  MRN # [id number]   DATE OF VISIT: May 25, 2023  YOB: 1955     CANCER TYPE: Lung adenocarcinoma  STAGE: IA2 [id number] poorly diff adeno RLL, then [id number] IA2 suspected NSCLC RUL, medically inoperable     ECOG PS: 1-2    PD-L1: N/A  Lung panel: N/A  NGS: N/A    SUMMARY  12/24/15 PET/CT  1/13/15 CT lung bx. Adenocarcinoma  2/8/16 R thoracotomy, RLL lobectomy (Dr. Cunha). Adenocarcinoma, 1.1 cm, assoicated with atypical adenomatous hyperplasia  10/18/19 CTA for shortness of breath. New 1.3 cm RUL nodule  11/2019 PET/CT. 1.1 cm RUL hypermetabolic nodule (SUV 12.6)  12/26/19 Brain MRI negative for mets  1/14~1/28/20 SBRT to RUL nodule, 5000 cGy, every other day, 1000 cGy (Dr. Currie at [facility] No bx due to O2 dependence and too much risk  8/19/20 First visit with me   11/29/21 CT chest. New 10 mm RUL nodule  1/11/22 CT chest. New 7.2 mm RUL nodule, unchanged RUL nodules  3/31/22 CT chest. New RUL nodule from Jan 2022 7-->10 mm, new 5 mm ROBERT nodule  5/20~5/24/22 [facility] for COPD exacerbation.   6/24/22 CT chest non contrast.   8/16/22 EGD. 3 cm hiatal hernia, o/w normal  8/31/22 CT chest. 2.5 x 1.3 cm R midlung subpleural nodularity (4:98) previously 2.3 x 1.1 cm, slightly increased solid component   10/5/22 PET/CT. 2 x 1.3 cm irregular opacity posterior RUL (SUV 2.46), 2.4 x 0.8 cm linear opacity (SUV 4.76); there was bronchiectasia on prior imaging. Stable 1.1 x 0.8 cm non FDG avid RUL opacity and unchanged 4 mm posterior RUL nodule. No adenopathy. Inferior right breast uptake (SUV 6.09) likely infectious or inflammatory.   2/3~2/9/23 [facility] for COPD exacerbation  2/4/23 CTA during hospitalization. No  significant change in 2.3 x 1.5 cm spiculated nodule in the right upper lobe (series 7 image 94) with surrounding linear parenchymal opacities and subtle nodularity  2/20~2/24/23 [facility] for COPD exacerbation.     ASSESSMENT AND PLAN  NSCLC, adeno, RUL: See prior notes about questionable areas previously. However, on current scan, there is a new area that looks abnormal in the R lung peripherally. While it doesn't quite look like cancer to me, it's abnormal enough that we need to make sure. Recommended PET. It'll be a few weeks before we're able to get it anyway. I'll see her afterward.     COPD: Some recent exacerbations earlier this year, more stable recently. Seeing Dr. Soriano in Pulmonary, visit note 4/21/23 reviewed    DM2:Seeing Dr. Shabazz. Note 4/24/23 reviewed    Peripheral neuropathy: not discussed today    30 minutes spent by me on the date of the encounter doing chart review, history and exam, documentation and further activities per the note     Zarina Leung MD  Associate Professor of Medicine  Hematology, Oncology and Transplantation      SUBJECTIVE  Jenn returns for close interim follow up of R lung nodules.   Doing ok overall today, no major changes. Back still bothersome - no different than usual  Some stress at home - packages being withheld from her. Because of this, is late on receiving diabetes meds.     PAST MEDICAL HISTORY  Lung adenocarcinoma  DM2 with neuropathy  HCV IA, undetectable in 2019, treated  COPD. O2 dependent since late 2018. PFT 11/26/19. FEV1 0.64 (30%), FVC 1.69 (63%), DLCOunc 9.8 (38%).  HTN  H/o ITP  H/o disk herniation  Ankle surgery  Median neuropathy R  H/o sessile colon polyps 2014, h/o adenomas before that. Colonoscopy 2/7/18 @ [facility]. Sessile serated adenoma x 1, tubular adenoma x 3  H/o chronic LBP  Dyslipidemia  Cataracts    CURRENT OUTPATIENT MEDICATIONS    ALLERGIES  Allergies   Allergen Reactions    Interferons Dermatitis    Penicillins Hives    Aspirin      325mg      Colon Care       REVIEW OF SYSTEMS  As above in the HPI, o/w complete 12-point ROS was negative.    PHYSICAL EXAM  /64 (BP Location: Right arm, Patient Position: Sitting, Cuff Size: Adult Large)   Pulse 85   Temp 97.7  F (36.5  C) (Tympanic)   Resp 16   Wt 94.1 kg (207 lb 8 oz)   SpO2 94%   BMI 33.49 kg/m    GEN: NAD  HEENT: EOMI, no icterus, injection or pallor  EXT: no edema  NEURO: alert    LABORATORY AND IMAGING STUDIES   05/25/23 09:09   Sodium 144   Potassium 4.0   Chloride 105   Carbon Dioxide (CO2) 32 (H)   Urea Nitrogen 8.1   Creatinine 0.55   GFR Estimate >90   Calcium 9.1   Anion Gap 7   Albumin 4.0   Protein Total 7.0   Alkaline Phosphatase 89   ALT 9 (L)   AST 14   Bilirubin Total 0.3   Glucose 114 (H)     Labs were independently reviewed and interpreted by me     EXAM: CT CHEST ABDOMEN W CONTRAST  LOCATION: [facility]  DATE/TIME: 5/25/2023 10:00 AM CDT     INDICATION: restage lung adenocarcinoma. ROBERT lobectomy remote, SBRT to RUL nodule in about 2020. Questionable peripheral nodule on last several CTs.  COMPARISON: Multiple priors with the most recent dated 03/22/2023  TECHNIQUE: CT scan of the chest and abdomen was performed following injection of IV contrast. Multiplanar reformats were obtained. Dose reduction techniques were used.   CONTRAST: Isovue 370 109cc     FINDINGS:      LUNGS AND PLEURA: Patent central airways. Right thoracic volume loss from prior lobectomy. Moderate apical predominant centrilobular emphysema. Unchanged scarlike right apical nodule measuring 1.3 x 0.5 cm (series 4, image 36). Unchanged posterior right   upper lobe nodular consolidation measuring 2.4 x 1.3 cm (series 4, image 99). There is new nodular/masslike area of consolidation anteriorly and laterally measuring 3.3 x 1.4 cm (series 4, image 99), which was not identified on 03/22/2023. Unchanged   reticulonodular opacities in the posterior lateral left upper  lobe compatible with chronic pneumonitis and endobronchial plugging. Right upper lobe calcified granuloma. No new pleural effusion or pneumothorax.     MEDIASTINUM/AXILLAE: No incidental central pulmonary embolus. Normal caliber thoracic aorta. No intrathoracic lymphadenopathy. Normal heart size. No pericardial effusion. Small hiatal hernia.     CORONARY ARTERY CALCIFICATION: Mild.     HEPATOBILIARY: Homogeneous enhancement. No focal hepatic mass. No biliary dilatation or calcified gallstones.     PANCREAS: Atrophic enhancement without suspicious pancreatic mass, peripancreatic inflammation, or pancreatic ductal dilatation.     SPLEEN: Normal size.     ADRENAL GLANDS: No nodules.      KIDNEYS: No suspicious renal mass or hydronephrosis.     BOWEL: Unremarkable appearance of the infradiaphragmatic stomach and duodenum. No dilated loops of small bowel in the abdomen to suggest an obstruction. Colonic diverticulosis in the visualized colon without pericolonic inflammation.     LYMPH NODES: No intra-abdominal lymphadenopathy by size criteria.     VASCULATURE: Normal caliber abdominal aorta. Patent main portal, splenic, and superior mesenteric veins. Patent bilateral renal veins.     MUSCULOSKELETAL: No new aggressive osseous lesion.                                                IMPRESSION:  1.  Unchanged right upper lobe nodular consolidation. However, there is new nodular/masslike area of consolidation anteriorly and laterally. While this may reflect focal pneumonitis, neoplastic progression would be difficult to exclude. Consider further   evaluation with PET/CT or close imaging surveillance with repeat CT in 3 months.  2.  Unchanged scarlike right apical nodule, which is of doubtful clinical significance.  3.  Moderate emphysema.  4.  No new lymphadenopathy.    Imaging was personally reviewed and interpreted by me and compared to prior imaging - CT chest 3/22/23 and 3/1/23, and CTA 2/4/23           Zarina Leung  MD

## 2023-05-25 NOTE — PROGRESS NOTES
[facility]    PATIENT NAME: Jenn Aparicio  MRN # [id number]   DATE OF VISIT: May 25, 2023  YOB: 1955     CANCER TYPE: Lung adenocarcinoma  STAGE: IA2 [id number] poorly diff adeno RLL, then [id number] IA2 suspected NSCLC RUL, medically inoperable     ECOG PS: 1-2    PD-L1: N/A  Lung panel: N/A  NGS: N/A    SUMMARY  12/24/15 PET/CT  1/13/15 CT lung bx. Adenocarcinoma  2/8/16 R thoracotomy, RLL lobectomy (Dr. Cunha). Adenocarcinoma, 1.1 cm, assoicated with atypical adenomatous hyperplasia  10/18/19 CTA for shortness of breath. New 1.3 cm RUL nodule  11/2019 PET/CT. 1.1 cm RUL hypermetabolic nodule (SUV 12.6)  12/26/19 Brain MRI negative for mets  1/14~1/28/20 SBRT to RUL nodule, 5000 cGy, every other day, 1000 cGy (Dr. Currie at [facility]). No bx due to O2 dependence and too much risk  8/19/20 First visit with me   11/29/21 CT chest. New 10 mm RUL nodule  1/11/22 CT chest. New 7.2 mm RUL nodule, unchanged RUL nodules  3/31/22 CT chest. New RUL nodule from Jan 2022 7-->10 mm, new 5 mm ROBERT nodule  5/20~5/24/22 [facility] for COPD exacerbation.   6/24/22 CT chest non contrast.   8/16/22 EGD. 3 cm hiatal hernia, o/w normal  8/31/22 CT chest. 2.5 x 1.3 cm R midlung subpleural nodularity (4:98) previously 2.3 x 1.1 cm, slightly increased solid component   10/5/22 PET/CT. 2 x 1.3 cm irregular opacity posterior RUL (SUV 2.46), 2.4 x 0.8 cm linear opacity (SUV 4.76); there was bronchiectasia on prior imaging. Stable 1.1 x 0.8 cm non FDG avid RUL opacity and unchanged 4 mm posterior RUL nodule. No adenopathy. Inferior right breast uptake (SUV 6.09) likely infectious or inflammatory.   2/3~2/9/23 [facility] for COPD exacerbation  2/4/23 CTA during hospitalization. No significant change in 2.3 x 1.5 cm spiculated nodule in the right upper lobe (series 7 image 94) with surrounding linear parenchymal opacities and subtle nodularity  2/20~2/24/23 [facility] for COPD exacerbation.     ASSESSMENT AND PLAN  NSCLC, adeno, RUL: See prior  notes about questionable areas previously. However, on current scan, there is a new area that looks abnormal in the R lung peripherally. While it doesn't quite look like cancer to me, it's abnormal enough that we need to make sure. Recommended PET. It'll be a few weeks before we're able to get it anyway. I'll see her afterward.     COPD: Some recent exacerbations earlier this year, more stable recently. Seeing Dr. Soriano in Pulmonary, visit note 4/21/23 reviewed    DM2:Seeing Dr. Shabazz. Note 4/24/23 reviewed    Peripheral neuropathy: not discussed today    30 minutes spent by me on the date of the encounter doing chart review, history and exam, documentation and further activities per the note     Zarina Leung MD  Associate Professor of Medicine  Hematology, Oncology and Transplantation      SUBJECTIVE  Jenn returns for close interim follow up of R lung nodules.   Doing ok overall today, no major changes. Back still bothersome - no different than usual  Some stress at home - packages being withheld from her. Because of this, is late on receiving diabetes meds.     PAST MEDICAL HISTORY  Lung adenocarcinoma  DM2 with neuropathy  HCV IA, undetectable in 2019, treated  COPD. O2 dependent since late 2018. PFT 11/26/19. FEV1 0.64 (30%), FVC 1.69 (63%), DLCOunc 9.8 (38%).  HTN  H/o ITP  H/o disk herniation  Ankle surgery  Median neuropathy R  H/o sessile colon polyps 2014, h/o adenomas before that. Colonoscopy 2/7/18 @ [facility]. Sessile serated adenoma x 1, tubular adenoma x 3  H/o chronic LBP  Dyslipidemia  Cataracts    CURRENT OUTPATIENT MEDICATIONS    ALLERGIES  Allergies   Allergen Reactions     Interferons Dermatitis     Penicillins Hives     Aspirin      325mg      Colon Care       REVIEW OF SYSTEMS  As above in the HPI, o/w complete 12-point ROS was negative.    PHYSICAL EXAM  /64 (BP Location: Right arm, Patient Position: Sitting, Cuff Size: Adult Large)   Pulse 85   Temp 97.7  F (36.5  C) (Tympanic)    Resp 16   Wt 94.1 kg (207 lb 8 oz)   SpO2 94%   BMI 33.49 kg/m    GEN: NAD  HEENT: EOMI, no icterus, injection or pallor  EXT: no edema  NEURO: alert    LABORATORY AND IMAGING STUDIES   05/25/23 09:09   Sodium 144   Potassium 4.0   Chloride 105   Carbon Dioxide (CO2) 32 (H)   Urea Nitrogen 8.1   Creatinine 0.55   GFR Estimate >90   Calcium 9.1   Anion Gap 7   Albumin 4.0   Protein Total 7.0   Alkaline Phosphatase 89   ALT 9 (L)   AST 14   Bilirubin Total 0.3   Glucose 114 (H)     Labs were independently reviewed and interpreted by me     EXAM: CT CHEST ABDOMEN W CONTRAST  LOCATION: [facility]  DATE/TIME: 5/25/2023 10:00 AM CDT     INDICATION: restage lung adenocarcinoma. ROBERT lobectomy remote, SBRT to RUL nodule in about 2020. Questionable peripheral nodule on last several CTs.  COMPARISON: Multiple priors with the most recent dated 03/22/2023  TECHNIQUE: CT scan of the chest and abdomen was performed following injection of IV contrast. Multiplanar reformats were obtained. Dose reduction techniques were used.   CONTRAST: Isovue 370 109cc     FINDINGS:      LUNGS AND PLEURA: Patent central airways. Right thoracic volume loss from prior lobectomy. Moderate apical predominant centrilobular emphysema. Unchanged scarlike right apical nodule measuring 1.3 x 0.5 cm (series 4, image 36). Unchanged posterior right   upper lobe nodular consolidation measuring 2.4 x 1.3 cm (series 4, image 99). There is new nodular/masslike area of consolidation anteriorly and laterally measuring 3.3 x 1.4 cm (series 4, image 99), which was not identified on 03/22/2023. Unchanged   reticulonodular opacities in the posterior lateral left upper lobe compatible with chronic pneumonitis and endobronchial plugging. Right upper lobe calcified granuloma. No new pleural effusion or pneumothorax.     MEDIASTINUM/AXILLAE: No incidental central pulmonary embolus. Normal caliber thoracic aorta. No  intrathoracic lymphadenopathy. Normal heart size. No pericardial effusion. Small hiatal hernia.     CORONARY ARTERY CALCIFICATION: Mild.     HEPATOBILIARY: Homogeneous enhancement. No focal hepatic mass. No biliary dilatation or calcified gallstones.     PANCREAS: Atrophic enhancement without suspicious pancreatic mass, peripancreatic inflammation, or pancreatic ductal dilatation.     SPLEEN: Normal size.     ADRENAL GLANDS: No nodules.      KIDNEYS: No suspicious renal mass or hydronephrosis.     BOWEL: Unremarkable appearance of the infradiaphragmatic stomach and duodenum. No dilated loops of small bowel in the abdomen to suggest an obstruction. Colonic diverticulosis in the visualized colon without pericolonic inflammation.     LYMPH NODES: No intra-abdominal lymphadenopathy by size criteria.     VASCULATURE: Normal caliber abdominal aorta. Patent main portal, splenic, and superior mesenteric veins. Patent bilateral renal veins.     MUSCULOSKELETAL: No new aggressive osseous lesion.                                                IMPRESSION:  1.  Unchanged right upper lobe nodular consolidation. However, there is new nodular/masslike area of consolidation anteriorly and laterally. While this may reflect focal pneumonitis, neoplastic progression would be difficult to exclude. Consider further   evaluation with PET/CT or close imaging surveillance with repeat CT in 3 months.  2.  Unchanged scarlike right apical nodule, which is of doubtful clinical significance.  3.  Moderate emphysema.  4.  No new lymphadenopathy.    Imaging was personally reviewed and interpreted by me and compared to prior imaging - CT chest 3/22/23 and 3/1/23, and CTA 2/4/23

## 2023-05-26 ENCOUNTER — OFFICE VISIT (OUTPATIENT)
Dept: OPHTHALMOLOGY | Facility: CLINIC | Age: 68
End: 2023-05-26
Attending: OPTOMETRIST
Payer: COMMERCIAL

## 2023-05-26 DIAGNOSIS — E11.9 DIABETES MELLITUS TYPE 2 WITHOUT RETINOPATHY (H): ICD-10-CM

## 2023-05-26 DIAGNOSIS — H52.203 MYOPIA OF BOTH EYES WITH ASTIGMATISM AND PRESBYOPIA: ICD-10-CM

## 2023-05-26 DIAGNOSIS — H04.123 DRY EYES, BILATERAL: ICD-10-CM

## 2023-05-26 DIAGNOSIS — H52.13 MYOPIA OF BOTH EYES WITH ASTIGMATISM AND PRESBYOPIA: ICD-10-CM

## 2023-05-26 DIAGNOSIS — H40.033 ANATOMICAL NARROW ANGLE OF BOTH EYES: ICD-10-CM

## 2023-05-26 DIAGNOSIS — H52.4 MYOPIA OF BOTH EYES WITH ASTIGMATISM AND PRESBYOPIA: ICD-10-CM

## 2023-05-26 DIAGNOSIS — H25.813 COMBINED FORMS OF AGE-RELATED CATARACT OF BOTH EYES: Primary | ICD-10-CM

## 2023-05-26 PROCEDURE — G0463 HOSPITAL OUTPT CLINIC VISIT: HCPCS | Performed by: STUDENT IN AN ORGANIZED HEALTH CARE EDUCATION/TRAINING PROGRAM

## 2023-05-26 PROCEDURE — 92025 CPTRIZED CORNEAL TOPOGRAPHY: CPT | Performed by: STUDENT IN AN ORGANIZED HEALTH CARE EDUCATION/TRAINING PROGRAM

## 2023-05-26 PROCEDURE — 92134 CPTRZ OPH DX IMG PST SGM RTA: CPT | Performed by: STUDENT IN AN ORGANIZED HEALTH CARE EDUCATION/TRAINING PROGRAM

## 2023-05-26 PROCEDURE — 92004 COMPRE OPH EXAM NEW PT 1/>: CPT | Performed by: STUDENT IN AN ORGANIZED HEALTH CARE EDUCATION/TRAINING PROGRAM

## 2023-05-26 PROCEDURE — 76519 ECHO EXAM OF EYE: CPT | Performed by: STUDENT IN AN ORGANIZED HEALTH CARE EDUCATION/TRAINING PROGRAM

## 2023-05-26 RX ORDER — MOXIFLOXACIN 5 MG/ML
1 SOLUTION/ DROPS OPHTHALMIC 4 TIMES DAILY
Qty: 3 ML | Refills: 0 | Status: SHIPPED | OUTPATIENT
Start: 2023-05-26 | End: 2023-09-01

## 2023-05-26 RX ORDER — KETOROLAC TROMETHAMINE 5 MG/ML
1 SOLUTION OPHTHALMIC 4 TIMES DAILY
Qty: 5 ML | Refills: 0 | Status: SHIPPED | OUTPATIENT
Start: 2023-05-26 | End: 2023-09-01

## 2023-05-26 RX ORDER — PREDNISOLONE ACETATE 10 MG/ML
1 SUSPENSION/ DROPS OPHTHALMIC 4 TIMES DAILY
Qty: 5 ML | Refills: 0 | Status: SHIPPED | OUTPATIENT
Start: 2023-05-26 | End: 2023-09-01

## 2023-05-26 ASSESSMENT — SLIT LAMP EXAM - LIDS
COMMENTS: WNL
COMMENTS: WNL

## 2023-05-26 ASSESSMENT — VISUAL ACUITY
OD_PH_CC: 20/25
OS_PH_CC: 20/40
OD_CC+: -2
CORRECTION_TYPE: GLASSES
OS_CC: 20/60
OD_PH_CC+: -2
OS_PH_CC+: -1
OS_CC+: +1
METHOD: SNELLEN - LINEAR
OD_CC: 20/30

## 2023-05-26 ASSESSMENT — CONF VISUAL FIELD
OD_NORMAL: 1
OD_SUPERIOR_TEMPORAL_RESTRICTION: 0
OS_INFERIOR_TEMPORAL_RESTRICTION: 0
OD_INFERIOR_NASAL_RESTRICTION: 0
OD_INFERIOR_TEMPORAL_RESTRICTION: 0
OD_SUPERIOR_NASAL_RESTRICTION: 0
OS_NORMAL: 1
METHOD: COUNTING FINGERS
OS_INFERIOR_NASAL_RESTRICTION: 0
OS_SUPERIOR_TEMPORAL_RESTRICTION: 0
OS_SUPERIOR_NASAL_RESTRICTION: 0

## 2023-05-26 ASSESSMENT — REFRACTION_WEARINGRX
OS_CYLINDER: +2.00
OS_SPHERE: -2.00
OD_SPHERE: -1.75
OD_CYLINDER: +1.00
OS_AXIS: 157
OD_ADD: +2.75
OS_ADD: +2.75
OD_AXIS: 178
SPECS_TYPE: BIFOCAL

## 2023-05-26 ASSESSMENT — CUP TO DISC RATIO
OS_RATIO: 0.4
OD_RATIO: 0.45

## 2023-05-26 ASSESSMENT — TONOMETRY
OS_IOP_MMHG: 21
IOP_METHOD: TONOPEN
OD_IOP_MMHG: 20

## 2023-05-26 ASSESSMENT — EXTERNAL EXAM - LEFT EYE: OS_EXAM: NORMAL

## 2023-05-26 ASSESSMENT — EXTERNAL EXAM - RIGHT EYE: OD_EXAM: NORMAL

## 2023-05-26 NOTE — PROGRESS NOTES
HPI    Pt states that she got new glasses but does not see as well as she would like to. No eye pain today. Occasional floaters in both eyes that come and go.   No redness or dryness.  DM2 BS: 178 this morning per pt.  Lab Results       Component                Value               Date                       A1C                      10.5                04/17/2023                 A1C                      9.4                 01/23/2023                 A1C                      9.9                 01/01/2023                 A1C                      9.8                 08/31/2022                 A1C                      8.4                 05/20/2022                 A1C                      7.0                 02/24/2021                 A1C                      6.8                 09/08/2020              NICHOLAS Sanchez May 26, 2023 8:54 AM        Last edited by Dante Madden COMT on 5/26/2023  8:54 AM.          Review of systems for the eyes was negative other than the pertinent positives/negatives listed in the HPI.    Ocular Meds: systane TID OU; olopatadine daily OU    Ocular Hx: cataracts OU; refractive error OU; anatomically narrow angles OU    FOHx: no family history of glaucoma or blindness    PMHx:     Past Medical History:   Diagnosis Date     Adenocarcinoma, lung (H)      Asthma      Ectopic pregnancy      Esophageal reflux      Pulmonary emphysema (H)     Very severe FEV1<30% predicted     Type II diabetes mellitus (H)        Assessment & Plan      Jenn Aparicio is a 67 year old female with the following diagnoses:    1. Combined forms of age-related cataract of both eyes    2. Anatomical narrow angle of both eyes    3. Diabetes mellitus type 2 without retinopathy (H)    4. Dry eyes, bilateral    5. Myopia of both eyes with astigmatism and presbyopia       Combined forms of age-related cataract OU  - visually significant and affecting ADLs,  - patient is right eye dominant  - various lens options  discussed with patient including premium, toric, and monofocal lenses; patient would like monofocal lenses aimed at plano and understands the possible need for glasses for distance and near vision  - r/b/a of cataract extraction with intraocular lens insertion including blindness, decreased vision, damage to surrounding structures, bleeding, infection, risk of anesthesia, and need for additional surgeries d/w pt, pt expressed understanding and would like to proceed; and informed consent was obtained.  - preop/postop drops prescribed: vigamox/predforte/ketorolac QID each to procedure eye starting two days prior to cataract surgery  - patient will see PCP for surgical clearance; on oxygen for COPD also has lung cancer  - patient to schedule POD#1, POW#1, POM#1 appt; will scheduled left eye first then right eye    Special equipment/needs:  Eye: left  Anesthesia: MAC with Topical  Dilates to: 7+ mm  Iris expansion: no  Trypan Blue: maybe    Able lay to flat: Yes  Blood Thinner: Yes asprin 81 mg  Tamsulosin: No  DM: Yes  Fuch's/Guttae/PXF: No   LASIK/PRK/SMILE/Eye Surgery/Intravitreal injection: No   Trauma: No   Diamox: preop diamox may be needed to deepen anterior chamber    Anatomically Narrow Angles OU  - IOP wnl, CVF full to CF OU  - no FHx of glaucoma  - on gonio undilated with 4-mirror has steep approach of iris with angles open to PTM/SS most areas, and opens on dynamic gonioscopy OU; previously dilated recently without issue  - suspect cataract surgery can help deepen anterior chamber OU  - r/b/a dilating exam reviewed with patient, angle closure return precautions reviewed; okay to dilated today for cataract surgery eval    T2DM without Retinopathy OU  - strict BP/BG control  - patient understands importance  of good blood glucose control in order to reduce the risk of developing diabetic retinopathy  - yearly DFE    Dry Eyes OU  - PFATs QID and prn OU    Myopia of both eyes with astigmatism and presbyopia  -  hold on refraction until after cataract surgery    Counseled return/RD precautions    Patient disposition:   Return for Follow Up for post op cataract surgery visit, or sooner changes.      Attending Physician Attestation:  Complete documentation of historical and exam elements from today's encounter can be found in the full encounter summary report (not reduplicated in this progress note).  I personally obtained the chief complaint(s) and history of present illness.  I confirmed and edited as necessary the review of systems, past medical/surgical history, family history, social history, and examination findings as documented by others; and I examined the patient myself.  I personally reviewed the relevant tests, images, and reports as documented above.  I formulated and edited as necessary the assessment and plan and discussed the findings and management plan with the patient and family. Today with Jenn Markus, I reviewed the indications, risks, benefits, and alternatives of the proposed surgical procedure including, but not limited to, failure obtain the desired result  and need for additional surgery, bleeding, infection, loss of vision, loss of the eye, and the remote possibility of permanent damage to any organ system or death with the use of anesthesia.  I provided multiple opportunities for the questions, answered all questions to the best of my ability, and confirmed that my answers and my discussion were understood. - Re Keita MD

## 2023-05-26 NOTE — NURSING NOTE
Chief Complaints and History of Present Illnesses   Patient presents with     Cataract Evaluation     Chief Complaint(s) and History of Present Illness(es)     Cataract Evaluation           Comments    Pt states that she got new glasses but does not see as well as she would like to. No eye pain today. Occasional floaters in both eyes that come and go.   No redness or dryness.  DM2 BS: 178 this morning per pt.  Lab Results       Component                Value               Date                       A1C                      10.5                04/17/2023                 A1C                      9.4                 01/23/2023                 A1C                      9.9                 01/01/2023                 A1C                      9.8                 08/31/2022                 A1C                      8.4                 05/20/2022                 A1C                      7.0                 02/24/2021                 A1C                      6.8                 09/08/2020              NICHOLAS Sanchez May 26, 2023 8:54 AM

## 2023-05-31 ENCOUNTER — TELEPHONE (OUTPATIENT)
Dept: OPHTHALMOLOGY | Facility: CLINIC | Age: 68
End: 2023-05-31
Payer: COMMERCIAL

## 2023-05-31 ENCOUNTER — DOCUMENTATION ONLY (OUTPATIENT)
Dept: INTERNAL MEDICINE | Facility: CLINIC | Age: 68
End: 2023-05-31
Payer: COMMERCIAL

## 2023-05-31 PROBLEM — H25.813 COMBINED FORMS OF AGE-RELATED CATARACT OF BOTH EYES: Status: ACTIVE | Noted: 2023-05-31

## 2023-05-31 NOTE — TELEPHONE ENCOUNTER
Called patient to schedule surgery with Dr. Keita    Date of Surgery: 8/24,9/5    Location of surgery: [facility]    Pre-Op H&P: PCP    Pre/Post Imaging:  No    Post-Op Appt Date: 8/25,9/1,9/6,9/13,9/22,10/4    Surgery Packet Mailed: yes    Additional comments: Spoke with patient, they are aware of above date, will review packet and reach out with any questions           Anna C. Schoenecker on 5/31/2023 at 12:51 PM

## 2023-06-05 ENCOUNTER — TELEPHONE (OUTPATIENT)
Dept: GASTROENTEROLOGY | Facility: CLINIC | Age: 68
End: 2023-06-05
Payer: COMMERCIAL

## 2023-06-05 NOTE — TELEPHONE ENCOUNTER
"Endoscopy Scheduling Screen    Have you had a positive Covid test in the last 14 days?  No    Are you active on MyChart?   No    What insurance is in the chart?  Other:  [facility]    Ordering/Referring Provider: BRITTANY PICHARDO   (If ordering provider performs procedure, schedule with ordering provider unless otherwise instructed. )    BMI: Estimated body mass index is 33.49 kg/m  as calculated from the following:    Height as of 5/15/23: 1.676 m (5' 6\").    Weight as of 5/25/23: 94.1 kg (207 lb 8 oz).     Sedation Ordered  MAC.   If patient BMI > 50 do not schedule in ASC.    Are you taking any prescription medications for pain?   No    Are you taking methadone or Suboxone?  No    Do you have a history of malignant hyperthermia or adverse reaction to anesthesia?  No    (Females) Are you currently pregnant?   No     Have you been diagnosed or told you have pulmonary hypertension?   No    Do you have an LVAD?  No    Have you been told you have moderate to severe sleep apnea?  No    Have you been told you have COPD, asthma, or any other lung disease?  Yes     What breathing problems do you have?  COPD AND ASTHMA     Do you use home oxygen?  Yes (Hospital Only)    Have your breathing problems required an ED visit or hospitalization in the last year?  Yes (RN Review required for scheduling unless scheduling in Hospital.)    Do you have any heart conditions?  No     Have you ever had or are you awaiting a heart or lung transplant?   No    Have you had a stroke or transient ischemic attack (TIA aka \"mini stroke\" in the last 6 months?   No    Have you been diagnosed with or been told you have cirrhosis of the liver?   No    Are you currently on dialysis?   No    Do you need assistance transferring?   No    BMI: Estimated body mass index is 33.49 kg/m  as calculated from the following:    Height as of 5/15/23: 1.676 m (5' 6\").    Weight as of 5/25/23: 94.1 kg (207 lb 8 oz).     Is patients BMI > 40 and scheduling location " [facility]?  No    Do you take the medication Phentermine, Ozempic or Wegovy?  No    Do you take the medication Naltrexone?  No    Do you take blood thinners?  No    Preferred Pharmacy:      [id number] - [place] - [address] AT [facility] [address]  [address]  [place] [address]  Phone: [phone] Fax: [phone]        Prep   Are you currently on dialysis or do you have chronic kidney disease?  No (standard prep)    Do you have a diagnosis of diabetes?  Yes (Golytely Prep)    Do you have a diagnosis of cystic fibrosis (CF)?  No    On a regular basis do you go 3 -5 days between bowel movements?  No    BMI > 40?  No    Final Scheduling Details   Colonoscopy prep sent?  Standard Golytely    Procedure scheduled  YES    Surgeon:  BRITTANY PICHARDO     Date of procedure:  8/3     Schedule PAC:   Yes (Schedule PAC evaluation.)    Location  [facility]    Sedation   MAC/Deep Sedation    Patient Reminders:    You will receive a call from a Nurse to review instructions and health history.  This assessment must be completed prior to your procedure.  Failure to complete the Nurse assessment may result in the procedure being cancelled.       On the day of your procedure, please designate an adult(s) who can drive you home stay with you for the next 24 hours. The medicines used in the exam will make you sleepy. You will not be able to drive.       You cannot take public transportation, ride share services, or non-medical taxi service without a responsible caregiver.  Medical transport services are allowed with the requirement that a responsible caregiver will receive you at your destination.  We require that drivers and caregivers are confirmed prior to your procedure.

## 2023-06-06 ENCOUNTER — DOCUMENTATION ONLY (OUTPATIENT)
Dept: INTERNAL MEDICINE | Facility: CLINIC | Age: 68
End: 2023-06-06
Payer: COMMERCIAL

## 2023-06-06 NOTE — TELEPHONE ENCOUNTER
FUTURE VISIT INFORMATION      SURGERY INFORMATION:    Date: 8/3/23    [facility]    Surgeon:  Travis Briseno MD    Anesthesia Type:  Moderate Sedation    Procedure:  Esophagoscopy, gastroscopy, duodenoscopy (EGD), combined    RECORDS REQUESTED FROM:       Primary Care Provider: Mitzi Nettles APRN [facility]    Most recent EKG+ Tracin23    Most recent ECHO: 22    Most recent PFT's: 23

## 2023-06-06 NOTE — PROGRESS NOTES
Type of Form Received:     Form Received (Date) 6/6/23   Form Filled out Yes 6/15/23   Placed in provider folder Yes

## 2023-06-07 ENCOUNTER — TELEPHONE (OUTPATIENT)
Dept: GASTROENTEROLOGY | Facility: CLINIC | Age: 68
End: 2023-06-07
Payer: COMMERCIAL

## 2023-06-07 DIAGNOSIS — J30.2 SEASONAL ALLERGIC RHINITIS, UNSPECIFIED TRIGGER: ICD-10-CM

## 2023-06-07 NOTE — TELEPHONE ENCOUNTER
Caller: Jenn Aparicio    Reason for Reschedule/Cancellation (please be detailed, any staff messages or encounters to note?): Provider out, patient request to schedule with another provider for earlier appointment. Reschedule with Dr Rodrigues. PAC has been rescheduled.      Prior to reschedule please review:    Ordering Provider:Travis Briseno MD    Sedation per order:MAC    Does patient have any ASC Exclusions, please identify?: YES, COPD/ASTHMA      Notes on Cancelled Procedure:    Procedure:Upper Endoscopy [EGD]     Date: 8/3    Location:[facility]; [address] [place] [address], [address] [place] [address]    Surgeon: MARINO SOTO        Rescheduled: Yes    Procedure: Upper Endoscopy [EGD]     Date: 8/8    Location:[facility]; [address] [place] [address]    Surgeon: HANS    Sedation Level Scheduled  MAC,  Reason for Sedation Level PER ORDER    Prep/Instructions updated and sent: LETTER

## 2023-06-08 ENCOUNTER — TELEPHONE (OUTPATIENT)
Dept: EDUCATION SERVICES | Facility: CLINIC | Age: 68
End: 2023-06-08
Payer: COMMERCIAL

## 2023-06-08 NOTE — TELEPHONE ENCOUNTER
FUTURE VISIT INFORMATION        SURGERY INFORMATION:    Date: 8/3/23    [facility]    Surgeon:  Travis Briseno MD    Anesthesia Type:  Moderate Sedation    Procedure:  Esophagoscopy, gastroscopy, duodenoscopy (EGD), combined     RECORDS REQUESTED FROM:         Primary Care Provider: Mitzi Nettles APRN [facility]     Most recent EKG+ Tracin23     Most recent ECHO: 22     Most recent PFT's: 23

## 2023-06-08 NOTE — TELEPHONE ENCOUNTER
[facility] Call Center    Phone Message    May a detailed message be left on voicemail: yes     Reason for Call: Other: Flo Whelan home care nurse would like to ask what pt should do at this time. Per Cleopatra pt is have more low blood sugar numbers and wanted to the team to be aware. Flo Whelan is wondering if the lantus numbers needs to be adjust or not ? Please call pt back to discuss. Thank you!     Action Taken: Message routed to:  [facility] ENDO    Travel Screening: Not Applicable

## 2023-06-08 NOTE — TELEPHONE ENCOUNTER
Phone call to Jenn Whelan's homecare nurse.  Scattered lows throughout the day.  Sensor was off for a while due to not having another one to put on.  Resumed sensor use yesterday.  Is now taking Ozempic. Advised to drop Lantus to 5.  Follow up is in place. Nithya Narvaez RN,[facility] [place]

## 2023-06-09 ENCOUNTER — TELEPHONE (OUTPATIENT)
Dept: INTERNAL MEDICINE | Facility: CLINIC | Age: 68
End: 2023-06-09
Payer: COMMERCIAL

## 2023-06-09 RX ORDER — CETIRIZINE HYDROCHLORIDE 10 MG/1
TABLET ORAL
Qty: 30 TABLET | Refills: 9 | Status: SHIPPED | OUTPATIENT
Start: 2023-06-09 | End: 2023-08-09

## 2023-06-09 NOTE — TELEPHONE ENCOUNTER
[facility] Call Center    Phone Message    May a detailed message be left on voicemail: yes     Reason for Call: Order(s): Other:   Reason for requested:   Medical social worker assessment and evaluation to address housing and community resource needs, they have a medical social worker on staff, once they get the order the patient will start with them, please call with verbal orders thank you.   Date needed: asap  Provider name: PCP:  Mitzi Nettles APRN CNP      Action Taken: Message routed to:  [facility] pcc    Travel Screening: Not Applicable

## 2023-06-09 NOTE — TELEPHONE ENCOUNTER
CETIRIZINE TABS-OTC 10MG      Last Written Prescription Date:  5/4/23  Last Fill Quantity: 30,   # refills: 10  Last Office Visit : 3/16/23  Future Office visit:  6/29/23    Remaining refills sent to requested pharmacy.

## 2023-06-12 ENCOUNTER — DOCUMENTATION ONLY (OUTPATIENT)
Dept: INTERNAL MEDICINE | Facility: CLINIC | Age: 68
End: 2023-06-12
Payer: COMMERCIAL

## 2023-06-12 NOTE — TELEPHONE ENCOUNTER
Last saw PCP in Jan, 2022.  Pt has future appt scheduled with PCP on 06/29/23.      Called Radha and gave verbal orders per Mitzi Nettles CNP for : eval and assess        Brodie Negron CMA ([facility]) at 9:37 AM on 6/12/2023

## 2023-06-12 NOTE — PROGRESS NOTES
Type of Form Received:     Form Received (Date) 6/12/23   Form Filled out Yes 6/15/23   Placed in provider folder Yes

## 2023-06-12 NOTE — PROGRESS NOTES
Type of Form Received: Home health care cert and plan of care    Form Received (Date) 6/12/23   Form Filled out No   Placed in provider folder Yes

## 2023-06-13 ENCOUNTER — OFFICE VISIT (OUTPATIENT)
Dept: URGENT CARE | Facility: URGENT CARE | Age: 68
End: 2023-06-13
Payer: COMMERCIAL

## 2023-06-13 ENCOUNTER — DOCUMENTATION ONLY (OUTPATIENT)
Dept: INTERNAL MEDICINE | Facility: CLINIC | Age: 68
End: 2023-06-13
Payer: COMMERCIAL

## 2023-06-13 VITALS
SYSTOLIC BLOOD PRESSURE: 145 MMHG | DIASTOLIC BLOOD PRESSURE: 74 MMHG | OXYGEN SATURATION: 95 % | HEART RATE: 106 BPM | TEMPERATURE: 99.6 F | WEIGHT: 206 LBS | BODY MASS INDEX: 33.25 KG/M2

## 2023-06-13 DIAGNOSIS — R30.0 DYSURIA: Primary | ICD-10-CM

## 2023-06-13 DIAGNOSIS — R10.2 SUPRAPUBIC PAIN: ICD-10-CM

## 2023-06-13 LAB
ALBUMIN UR-MCNC: NEGATIVE MG/DL
APPEARANCE UR: CLEAR
BACTERIA #/AREA URNS HPF: ABNORMAL /HPF
BILIRUB UR QL STRIP: NEGATIVE
CLUE CELLS: ABNORMAL
COLOR UR AUTO: YELLOW
GLUCOSE UR STRIP-MCNC: >=1000 MG/DL
HGB UR QL STRIP: NEGATIVE
KETONES UR STRIP-MCNC: NEGATIVE MG/DL
LEUKOCYTE ESTERASE UR QL STRIP: NEGATIVE
NITRATE UR QL: NEGATIVE
PH UR STRIP: 7 [PH] (ref 5–7)
RBC #/AREA URNS AUTO: ABNORMAL /HPF
SP GR UR STRIP: <=1.005 (ref 1–1.03)
SQUAMOUS #/AREA URNS AUTO: ABNORMAL /LPF
TRICHOMONAS, WET PREP: ABNORMAL
UROBILINOGEN UR STRIP-ACNC: 4 E.U./DL
WBC #/AREA URNS AUTO: ABNORMAL /HPF
WBC'S/HIGH POWER FIELD, WET PREP: ABNORMAL
YEAST, WET PREP: ABNORMAL

## 2023-06-13 PROCEDURE — 81001 URINALYSIS AUTO W/SCOPE: CPT

## 2023-06-13 PROCEDURE — 87210 SMEAR WET MOUNT SALINE/INK: CPT

## 2023-06-13 PROCEDURE — 99213 OFFICE O/P EST LOW 20 MIN: CPT

## 2023-06-13 NOTE — PROGRESS NOTES
ASSESSMENT:   (R30.0) Dysuria  (primary encounter diagnosis)  Plan: UA with Microscopic reflex to Culture - lab         collect, UA Microscopic with Reflex to Culture    (R10.2) Suprapubic pain  Plan: Wet prep - lab collect      PLAN:  Informed the patient that her urine and wet prep tests are negative.  We discussed the need for her to follow-up with her primary care provider should symptoms persist.  Patient acknowledged her understanding of the above plan.    The use of Dragon/PowerMic dictation services may have been used to construct the content in this note; any grammatical or spelling errors are non-intentional. Please contact the author of this note directly if you are in need of any clarification.      Kirit Holguin, APRN CNP     SUBJECTIVE:   Jenn Aparicio is a 67 year old female who  presents today for a possible UTI. Symptoms of dysuria, urgency, frequency, burning, hesitancy, suprapubic pain and pressure, back pain, nausea, fever and chills have been going on for 2day(s).  Hematuria no.  gradual onsetand severe.  There is no history of fever, chills, nausea or vomiting.  This patient does have a history of urinary tract infections. Patient denies vaginal symptoms.    ROS:   Negative except noted above.    OBJECTIVE:  GENERAL APPEARANCE: healthy, alert and no distress  RESP: lungs clear to auscultation - no rales, rhonchi or wheezes and decreased breath sounds bilaterally  CV: regular rates and rhythm, normal S1 S2, no murmur noted  ABDOMEN:  soft, nontender, no HSM or masses and bowel sounds normal  BACK: No CVA tenderness  SKIN: no suspicious lesions or rashes    UA: positive for glucose, positive for urobilinogen and positive for bacturia

## 2023-06-13 NOTE — PROGRESS NOTES
Type of Form Received:     Form Received (Date) 6/13/23   Form Filled out Yes 6/15/23   Placed in provider folder Yes

## 2023-06-15 ENCOUNTER — DOCUMENTATION ONLY (OUTPATIENT)
Dept: INTERNAL MEDICINE | Facility: CLINIC | Age: 68
End: 2023-06-15
Payer: COMMERCIAL

## 2023-06-15 ENCOUNTER — TRANSFERRED RECORDS (OUTPATIENT)
Dept: HEALTH INFORMATION MANAGEMENT | Facility: CLINIC | Age: 68
End: 2023-06-15
Payer: COMMERCIAL

## 2023-06-15 ENCOUNTER — MEDICAL CORRESPONDENCE (OUTPATIENT)
Dept: HEALTH INFORMATION MANAGEMENT | Facility: CLINIC | Age: 68
End: 2023-06-15
Payer: COMMERCIAL

## 2023-06-15 ENCOUNTER — VIRTUAL VISIT (OUTPATIENT)
Dept: PHARMACY | Facility: CLINIC | Age: 68
End: 2023-06-15
Payer: COMMERCIAL

## 2023-06-15 DIAGNOSIS — Z53.9 DIAGNOSIS NOT YET DEFINED: Primary | ICD-10-CM

## 2023-06-15 DIAGNOSIS — E11.42 DIABETIC POLYNEUROPATHY ASSOCIATED WITH TYPE 2 DIABETES MELLITUS (H): ICD-10-CM

## 2023-06-15 DIAGNOSIS — Z72.0 TOBACCO ABUSE: ICD-10-CM

## 2023-06-15 DIAGNOSIS — E78.5 HYPERLIPIDEMIA, UNSPECIFIED HYPERLIPIDEMIA TYPE: ICD-10-CM

## 2023-06-15 DIAGNOSIS — E11.9 TYPE 2 DIABETES MELLITUS WITHOUT COMPLICATION, WITHOUT LONG-TERM CURRENT USE OF INSULIN (H): Primary | ICD-10-CM

## 2023-06-15 PROCEDURE — 99607 MTMS BY PHARM ADDL 15 MIN: CPT | Performed by: PHARMACIST

## 2023-06-15 PROCEDURE — 99606 MTMS BY PHARM EST 15 MIN: CPT | Performed by: PHARMACIST

## 2023-06-15 NOTE — PROGRESS NOTES
Medication Therapy Management (MTM) Encounter    ASSESSMENT:                            Medication Adherence/Access: No issues identified    Type 2 Diabetes:  Since she has been having blood glucose dropping down low she should stop her lantus at this time.     Diabetes neuropathy:   Improved due to starting alpha lipoic acid     Smoking Cessation: Will wait 6 weeks of quitting before tapering down on these medications.     Hyperlipidemia: Re-emphasized the benefits of starting a statin, however patient still declines.     PLAN:                            1. Stop lantus.  2. Continue to consider adding statin.     Follow-up: Return in about 4 weeks (around 7/13/2023).      SUBJECTIVE/OBJECTIVE:                          Jenn Aparicio is a 67 year old female called for a follow-up visit.  Today's visit is a follow-up MTM visit from 5/4/23     Reason for visit: MTM follow-up.    Allergies/ADRs: Reviewed in chart  Past Medical History: Reviewed in chart  Tobacco: She reports that she has quit smoking. Her smoking use included cigarettes. She smoked an average of .25 packs per day. She has never used smokeless tobacco.  Alcohol: not discussed today      Medication Adherence/Access: no issues reported    Type 2 Diabetes:  Currently taking: Jardiance 25 mg daily, Lantus  5 units daily, Oempic 0.25 mg weekly. Patient is not experiencing side effects.  Blood sugar monitoring: CGM. Ranges (patient reported): still dropping down into the 40's mg/dL  Symptoms of low blood sugar? nausea and dizzy  Symptoms of high blood sugar? none  Eye exam: due  Foot exam: due  Diet/Exercise: did not assess during this visit  Aspirin: Taking 81mg daily for primary prevention   Statin: No   ACEi/ARB: No.           Urine Albumin:         Lab Results   Component Value Date     UMALCR 63.82 (H) 04/17/2023            Lab Results   Component Value Date     A1C 10.5 04/17/2023     A1C 9.4 01/23/2023     A1C 9.9 01/01/2023     A1C 9.8 08/31/2022     A1C  8.4 05/20/2022     A1C 7.0 02/24/2021     A1C 6.8 09/08/2020      Diabetes neuropathy: Currently taking pregabalin 150 mg twice daily and Glucoasamine-chondroitin -400 mg tab daily.   Restarted alpha lipoic acid and this was helpful.      Smoking Cessation: Current therapy include Wellbutrin 300 mg daily, nicotine patch 14 mg daily, and nicotine lozenges 4 mg as needed. Patient reports not smoking at all in the last week.     Hyperlipidemia: No current statin therapy. Patient reports that she has a lot going on and doesn't want to start a statin. Not open to adding medication.      The 10-year ASCVD risk score (Chun TENORIO, et al., 2019) is: 52.8%    Values used to calculate the score:      Age: 67 years      Sex: Female      Is Non- : Yes      Diabetic: Yes      Tobacco smoker: Yes      Systolic Blood Pressure: 138 mmHg      Is BP treated: Yes      HDL Cholesterol: 58 mg/dL      Total Cholesterol: 251 mg/dL  Recent Labs   Lab Test 01/27/22  1439 01/11/22  0845   CHOL 251* 224*   HDL 58 55   * 149*   TRIG 127 102       Today's Vitals: There were no vitals taken for this visit.  ----------------      I spent 30 minutes with this patient today. All changes were made via collaborative practice agreement with BENOIT Ruiz CNP. A copy of the visit note was provided to the patient's provider(s).    A summary of these recommendations was mailed to the patient.    Samy Prasad, Pharm. D., [facility]  Medication Therapy Management Pharmacist      Telemedicine Visit Details  Type of service:  Telephone visit  Start Time: 130 p  End Time: 2 p     Medication Therapy Recommendations  Type 2 diabetes mellitus without complication, without long-term current use of insulin (H)    Current Medication: insulin glargine (LANTUS PEN) 100 UNIT/ML pen (Discontinued)   Rationale: Undesirable effect - Adverse medication event - Safety   Recommendation: Discontinue Medication   Status: Accepted - no  CPA Needed

## 2023-06-15 NOTE — PROGRESS NOTES
CLINICAL NUTRITION SERVICES - ASSESSMENT NOTE     Nutrition Prescription    RECOMMENDATIONS FOR MDs/PROVIDERS TO ORDER:  -If unable to advance diet to at least full liquids with tolerance in the next 3-4 days recommend starting enteral nutrition (NJ-tube) to meet nutritional needs    Malnutrition Status:    -Severe    Recommendations already ordered by Registered Dietitian (RD):  -None at this time    Future/Additional Recommendations:  -Once diet advances recommend ensure clears  -If unable to advance diet and enteral nutrition needed recommend goal Peptamen 1.5 @ goal 55 ml/hr (1320 ml/day) to provide 1980 kcals (32 kcal/kg/day), 90 g PRO (1.5 g/kg/day), 1016 ml free H2O, 74 g Fat (70% from MCTs), 248 g CHO and no Fiber daily.  -Restart home enzyme regimen once pt tolerating full liquid diet.      REASON FOR ASSESSMENT  Kitty Bangura is a/an 59 year old female assessed by the dietitian for Admission Nutrition Risk Screen for unintentional loss of 10# or more in the past two months and RN consult - Patient requesting nutrition services.    NUTRITION HISTORY  Patient reports decreased PO intakes when she developed pancreatitis at the beginning of November, she was later diagnosed with pancreatic adenocarcinoma. She notes she had trouble tolerating foods and was instructed to eat no fat. She then began losing weight and was told she could up her fat intake to 3g per day. She also notes decreased appetite related to cancer/chemo. She has been following this for the most part but has been eating a little more fat as tolerated. Pt last ate food on Friday.    A typical day of eating includes:  B: fat free greek yogurt w/protein powder or 1 egg + banana+ small amount of oatmeal or toast  Snack: fruit or piece of toast  L: 1 cup of soup with crackers and sometimes half sandwich  D: salad with balsamic dressing and cottage cheese or turkey or sandwich or chicken and rice      Medications: Creon 24 (2-3 capsules with  Type of Form Received:     Form Received (Date) 6/15/23   Form Filled out Yes 6/27/23   Placed in provider folder Yes      "meals and 1-2 w/ snacks), zofran, imodium,compazine    Pt notes some constipation, no steathorrea observed    PMH: recently diagnosed pancreatic adenocarcinoma (11/2017), started on cycle 1 FOLFIRINOX on 1/15/2018. Admitted with 3 day history of worsening abdominal pain, n/v with imaging consistent with acute pancreatitis.     CURRENT NUTRITION ORDERS  Diet: NPO    -Plan for ERCP with stent placement    LABS  Labs reviewed    MEDICATIONS  IVF D5% @ 100mL/hr this provides: 120g cho     ANTHROPOMETRICS  Height: 165.1 cm (5' 5\")  Most Recent Weight: 76.2 kg (168 lb 1.6 oz)   UBW: 180-185lbs  IBW: 56.8 kg  BMI: Overweight BMI 25-29.9  Weight History:   Wt Readings from Last 20 Encounters:   01/22/18 76.2 kg (168 lb 1.6 oz)   01/15/18 76.6 kg (168 lb 14.4 oz)   01/12/18 76.2 kg (168 lb)   01/08/18 76.5 kg (168 lb 11.2 oz)   01/08/18 76.5 kg (168 lb 11.2 oz)   12/12/17 81.7 kg (180 lb 3.2 oz)   12/04/17 78.3 kg (172 lb 9.9 oz)   11/29/17 80.1 kg (176 lb 9.4 oz)   11/27/17 81.1 kg (178 lb 14.4 oz)   11/22/17 81.1 kg (178 lb 12.8 oz)   11/20/17 82.1 kg (181 lb 1.6 oz)   11/08/17 93.7 kg (206 lb 9.1 oz)   ~7% weight loss in <2 months, note weight from 11/8 is inaccurate and was d/t fluids in hospital.   Dosing Weight: 62kg    ASSESSED NUTRITION NEEDS  Estimated Energy Needs: 3506-8091 kcals/day (30 - 35 kcals/kg)  Justification: Repletion/wt loss  Estimated Protein Needs:74-93 grams protein/day (1.2 - 1.5 grams of pro/kg)  Justification: Hypercatabolism with critical illness  Estimated Fluid Needs: 1 mL/kcal  Justification: Maintenance    PHYSICAL FINDINGS  See malnutrition section below.    MALNUTRITION  % Intake: </=75% for >/= 1 month   % Weight Loss: ~7% weight loss in <2 months  Subcutaneous Fat Loss: None observed  Muscle Loss: None observed  Fluid Accumulation/Edema: None noted  Malnutrition Diagnosis: Severe malnutrition in the context of acute on chronic illness    NUTRITION DIAGNOSIS  Inadequate oral intake related " to decreased appetite w/ hx of not tolerating foods, fat diet restriction and npo for procedure as evidenced by pt with no PO x 3 days, eating only 3g of fat per meal and pt diet recall of smaller meals likely not meeting calorie/protein needs with a ~7% weight loss in <2 months    INTERVENTIONS  Implementation  Nutrition Education: Provided education on slowly increasing fat in diet (recommended starting with 5-6g fat per meal/snack) once acute pancreatis resolved to assess tolerance and continue to take enzymes with meals. Recommended pt trial nutrition supplements as a way to increase kcals/protein in her diet. Encouraged incorporating more snacks/supplements to increase overall calories/protein to promote weight maintenance.      Goals  Diet adv v nutrition support within 3-4 days.     Monitoring/Evaluation  Progress toward goals will be monitored and evaluated per protocol.    Shakila Amezquita RD, LD  Pager: [phone]

## 2023-06-15 NOTE — PROGRESS NOTES
Type of Form Received:     Form Received (Date) /15/23   Form Filled out Yes 6/27/23   Placed in provider folder Yes

## 2023-06-16 ENCOUNTER — HOSPITAL ENCOUNTER (OUTPATIENT)
Dept: PET IMAGING | Facility: CLINIC | Age: 68
Discharge: HOME | End: 2023-06-16
Attending: INTERNAL MEDICINE | Admitting: INTERNAL MEDICINE
Payer: COMMERCIAL

## 2023-06-16 DIAGNOSIS — C34.31 MALIGNANT NEOPLASM OF LOWER LOBE OF RIGHT LUNG (H): ICD-10-CM

## 2023-06-16 PROCEDURE — 78816 PET IMAGE W/CT FULL BODY: CPT | Mod: PS

## 2023-06-16 PROCEDURE — A9552 F18 FDG: HCPCS | Performed by: INTERNAL MEDICINE

## 2023-06-16 PROCEDURE — 78816 PET IMAGE W/CT FULL BODY: CPT | Mod: 26 | Performed by: RADIOLOGY

## 2023-06-16 PROCEDURE — 71260 CT THORAX DX C+: CPT

## 2023-06-16 PROCEDURE — 343N000001 HC RX 343: Performed by: INTERNAL MEDICINE

## 2023-06-16 PROCEDURE — 250N000011 HC RX IP 250 OP 636: Performed by: INTERNAL MEDICINE

## 2023-06-16 PROCEDURE — 74177 CT ABD & PELVIS W/CONTRAST: CPT | Mod: 26 | Performed by: RADIOLOGY

## 2023-06-16 PROCEDURE — 71260 CT THORAX DX C+: CPT | Mod: 26 | Performed by: RADIOLOGY

## 2023-06-16 RX ORDER — IOPAMIDOL 755 MG/ML
10-135 INJECTION, SOLUTION INTRAVASCULAR ONCE
Status: COMPLETED | OUTPATIENT
Start: 2023-06-16 | End: 2023-06-16

## 2023-06-16 RX ADMIN — IOPAMIDOL 126 ML: 755 INJECTION, SOLUTION INTRAVENOUS at 09:30

## 2023-06-16 RX ADMIN — FLUDEOXYGLUCOSE F-18 12.32 MILLICURIE: 500 INJECTION, SOLUTION INTRAVENOUS at 08:28

## 2023-06-16 NOTE — PATIENT INSTRUCTIONS
"Recommendations from today's MTM visit:                                                    MTM (medication therapy management) is a service provided by a clinical pharmacist designed to help you get the most of out of your medicines.   Today we reviewed what your medicines are for, how to know if they are working, that your medicines are safe and how to make your medicine regimen as easy as possible.      1. Stop lantus.  2. Continue to consider adding statin.     Follow-up: Return in about 4 weeks (around 7/13/2023).    It was great speaking with you today.  I value your experience and would be very thankful for your time in providing feedback in our clinic survey. In the next few days, you may receive an email or text message from [email or web address] with a link to a survey related to your  clinical pharmacist.\"     To schedule another MTM appointment, please call the clinic directly or you may call the MTM scheduling line at [phone] or toll-free at [phone].     My Clinical Pharmacist's contact information:                                                      Please feel free to contact me with any questions or concerns you have.      Samy Prasad, Pharm. D., BCACP  Medication Therapy Management Pharmacist     "

## 2023-06-20 ENCOUNTER — DOCUMENTATION ONLY (OUTPATIENT)
Dept: INTERNAL MEDICINE | Facility: CLINIC | Age: 68
End: 2023-06-20
Payer: COMMERCIAL

## 2023-06-20 ENCOUNTER — PATIENT OUTREACH (OUTPATIENT)
Dept: GASTROENTEROLOGY | Facility: CLINIC | Age: 68
End: 2023-06-20
Payer: COMMERCIAL

## 2023-06-20 NOTE — PROGRESS NOTES
Type of Form Received: Order    Form Received (Date) 06/16/2023   Form Filled out Yes 7/6/23   Placed in provider folder Yes

## 2023-06-20 NOTE — PROGRESS NOTES
[facility]    PATIENT NAME: Jenn Aparicio  MRN # [id number]   DATE OF VISIT: June 21, 2023  YOB: 1955     CANCER TYPE: Lung adenocarcinoma  STAGE: IA2 [id number] poorly diff adeno RLL, then [id number] IA2 suspected NSCLC RUL, medically inoperable     ECOG PS: 2    PD-L1: N/A  Lung panel: N/A  NGS: N/A    SUMMARY  12/24/15 PET/CT  1/13/15 CT lung bx. Adenocarcinoma  2/8/16 R thoracotomy, RLL lobectomy (Dr. Cunha). Adenocarcinoma, 1.1 cm, assoicated with atypical adenomatous hyperplasia  10/18/19 CTA for shortness of breath. New 1.3 cm RUL nodule  11/2019 PET/CT. 1.1 cm RUL hypermetabolic nodule (SUV 12.6)  12/26/19 Brain MRI negative for mets  1/14~1/28/20 SBRT to RUL nodule, 5000 cGy, every other day, 1000 [place] [facility] [place] bx due to O2 dependence and too much risk  8/19/20 First visit with me   11/29/21 CT chest. New 10 mm RUL nodule  1/11/22 CT chest. New 7.2 mm RUL nodule, unchanged RUL nodules  3/31/22 CT chest. New RUL nodule from Jan 2022 7-->10 mm, new 5 mm ROBERT nodule  5/20~5/24/22 [facility] for COPD exacerbation.   6/24/22 CT chest non contrast.   8/16/22 EGD. 3 cm hiatal hernia, o/w normal  8/31/22 CT chest. 2.5 x 1.3 cm R midlung subpleural nodularity (4:98) previously 2.3 x 1.1 cm, slightly increased solid component   10/5/22 PET/CT. 2 x 1.3 cm irregular opacity posterior RUL (SUV 2.46), 2.4 x 0.8 cm linear opacity (SUV 4.76); there was bronchiectasia on prior imaging. Stable 1.1 x 0.8 cm non FDG avid RUL opacity and unchanged 4 mm posterior RUL nodule. No adenopathy. Inferior right breast uptake (SUV 6.09) likely infectious or inflammatory.   2/3~2/9/23 [facility] for COPD exacerbation  2/4/23 CTA during hospitalization. No significant change in 2.3 x 1.5 cm spiculated nodule in the right upper lobe (series 7 image 94) with surrounding linear parenchymal opacities and subtle nodularity  2/20~2/24/23 [facility] for COPD exacerbation.   3/1/23 CT chest non contrast. 2.3 cm posterior RUL  irregular nodule unchanged from 12/1/22 however slightly increased soft tissue component compared to 10/5/22 PET. Stable ROBERT mucous plugging with micronodularity.  3/22/23 CT chest. Stable compared to 3/1/23  5/25/23 CT chest abd w/contrast. Unchanged 1.3 x 0.5 cm R apical nodule, unchnaged 2.4 x 1.3 cm R upper nodular consolidation. New nodular/masslike area of consolidation anteriorly and laterally measuring 3.3 x 1.4 cm, not present on CT 3/22/23. No adenopathy. Consider PET or close surveillance CT in 3 months      ASSESSMENT AND PLAN  NSCLC, adeno, RUL: PET worrisome for recurrent disease, both at the site of prior SBRT and perhaps adenopathy. We reviewed the images together. Discussed that we're at a point where I'd like to move forward with figuring out what's going on with bx. I think a percutaneous approach will be difficult - there's a rib there and with the COPD, it becomes riskier. The other approach is endobronchially. I'd like both the 4R node and peripheral R lung mass to be bx'd. We had discussed at tumor board last year and even at that time, IP had thought it would be feasible. Will refer to them - they'll likely prefer to see her first. If they decide that's the most appropriate route, then I'll see her back 3 business days after the bx to review the results and discuss next steps. I don't think that going after the R axillary node is the best idea. Spent time discussing next steps and goals should we find the cancer has returned - we discussed this last year as well.  Surgery and most likely more radiation would not be realistically feasible due to the COPD, so we'd be left with systemic therapy, which is not considered curative, with the caveats that we don't have any of the typical ancillary testing we routinely do nowadays that can certainly change prognosis and treatment options. Nonetheless, with this principle in mind, we balance potential toxicities and treatment differently than if we  were in a curative situation, etc. She recalls the conversation we had last year regarding this.     COPD: Some recent exacerbations earlier this year, more stable recently. Not discussed today, has follow up    DM2: Seeing Dr. Shabazz.    Peripheral neuropathy: Not discussed today    Looking to get DME for walker and lift chair. I see appt notes attached to her upcoming appt with Mitzi Nettles refer to this, so will defer to her.     30 minutes spent by me on the date of the encounter doing chart review, history and exam, documentation and further activities per the note     Zarina Leung MD  Associate Professor of Medicine  Hematology, Oncology and Transplantation      SUBJECTIVE  Jenn returns for close interim follow up of R lung nodules. They were worrisome enough to get a PET. She's here to discuss the results.   Bday tomorrow! No major changes since our last visit. Stopped one of her insulins - still has a few lows but overall feeling better than before, less tired.     PAST MEDICAL HISTORY  Lung adenocarcinoma  DM2 with neuropathy  HCV IA, undetectable in 2019, treated  COPD. O2 dependent since late 2018. PFT 11/26/19. FEV1 0.64 (30%), FVC 1.69 (63%), DLCOunc 9.8 (38%).  HTN  H/o ITP  H/o disk herniation  Ankle surgery  Median neuropathy R  H/o sessile colon polyps 2014, h/o adenomas before that. Colonoscopy 2/7/18 @ [facility]. Sessile serated adenoma x 1, tubular adenoma x 3  H/o chronic LBP  Dyslipidemia  Cataracts    CURRENT OUTPATIENT MEDICATIONS  Current Outpatient Medications   Medication Sig Dispense Refill     acetylcysteine (MUCOMYST) 10 % nebulizer solution Inhale 4 mLs into the lungs 4 times daily 480 mL 0     albuterol (PROAIR HFA/PROVENTIL HFA/VENTOLIN HFA) 108 (90 Base) MCG/ACT inhaler USE 1 OR 2 INHALATIONS EVERY 4 HOURS AS NEEDED 17 g 3     albuterol (PROVENTIL) (2.5 MG/3ML) 0.083% neb solution Take 1 vial (2.5 mg) by nebulization 4 times daily 360 mL 0     Alpha Lipoic Acid 200 MG CAPS Take 1  capsule by mouth daily 90 capsule 3     amLODIPine (NORVASC) 10 MG tablet Take 1 tablet (10 mg) by mouth daily 90 tablet 3     ASPIRIN LOW DOSE 81 MG EC tablet TAKE 1 TABLET DAILY 90 tablet 2     buPROPion (WELLBUTRIN XL) 300 MG 24 hr tablet Take 1 tablet (300 mg) by mouth every morning 90 tablet 3     cetirizine (ZYRTEC) 10 MG tablet TAKE 1 TABLET DAILY 30 tablet 9     Continuous Blood Gluc Sensor (FREESTYLE GIOVANNI 2 SENSOR) MISC 1 each every 14 days For use with Freestyle Giovanni 2  for continuous monitioring of blood glucose levels. Replace sensor every 14 days. 2 each 11     cyanocobalamin (VITAMIN B-12) 500 MCG tablet Take 1 tablet (500 mcg) by mouth daily 90 tablet 1     doxycycline hyclate (VIBRA-TABS) 100 MG tablet Take 1 tablet (100 mg) by mouth 2 times daily 10 tablet 1     empagliflozin (JARDIANCE) 25 MG TABS tablet Take 1 tablet (25 mg) by mouth daily 90 tablet 1     fluticasone (FLONASE) 50 MCG/ACT nasal spray Spray 1 spray into both nostrils daily Use at night before bed 15.8 mL 7     Fluticasone-Umeclidin-Vilanterol (TRELEGY ELLIPTA) 200-62.5-25 MCG/INH oral inhaler Inhale 1 puff into the lungs daily 28 each 5     GLUCOSAMINE-CHONDROITIN -400 MG tablet TAKE 1 TABLET DAILY 90 tablet 3     insulin pen needle (BD MARCIO U/F) 32G X 4 MM miscellaneous Use 1 pen needle daily or as directed. 100 each 1     ipratropium - albuterol 0.5 mg/2.5 mg/3 mL (DUONEB) 0.5-2.5 (3) MG/3ML neb solution Take 1 vial by nebulization every 6 hours as needed for shortness of breath, wheezing or cough       ketorolac (ACULAR) 0.5 % ophthalmic solution Place 1 drop Into the left eye 4 times daily 5 mL 0     ketotifen (ZADITOR) 0.025 % ophthalmic solution Place 1 drop into both eyes daily       moxifloxacin (VIGAMOX) 0.5 % ophthalmic solution Place 1 drop Into the left eye 4 times daily 3 mL 0     multivitamin w/minerals (THERA-VIT-M) tablet Take 1 tablet by mouth daily 30 tablet 11     nicotine (NICODERM CQ) 14 MG/24HR  24 hr patch Place 1 patch onto the skin every 24 hours       nicotine (NICORETTE) 4 MG lozenge Place 1 lozenge (4 mg) inside cheek every hour as needed for smoking cessation 100 lozenge 0     nicotine (NICOTROL) 10 MG inhaler Use 1 cartridge as needed for urge to smoke by puffing over course of 20min.  Use 6-16 cart/day; reduce number of cart/day over 6-12 weeks. 160 each 0     olopatadine (PATADAY) 0.2 % ophthalmic solution Place 0.05 mLs (1 drop) into both eyes daily 2.5 mL 11     omega-3 acid ethyl esters (LOVAZA) 1 g capsule Take 2 capsules (2 g) by mouth 2 times daily 360 capsule 3     omeprazole (PRILOSEC) 20 MG DR capsule Take 1 capsule (20 mg) by mouth 2 times daily 180 capsule 3     polyethylene glycol-propylene glycol (SYSTANE) 0.4-0.3 % SOLN ophthalmic solution Place 1 drop into both eyes 4 times daily 5 mL 11     polyethylene glycol-propylene glycol (SYSTANE) 0.4-0.3 % SOLN ophthalmic solution Place 1 drop into both eyes 4 times daily 5 mL 11     prednisoLONE acetate (PRED FORTE) 1 % ophthalmic suspension Place 1 drop Into the left eye 4 times daily 5 mL 0     predniSONE (DELTASONE) 20 MG tablet Take 2 tablets (40 mg) by mouth daily 10 tablet 3     pregabalin (LYRICA) 150 MG capsule Take 1 capsule (150 mg) by mouth 2 times daily 60 capsule 3     semaglutide (OZEMPIC) 2 MG/3ML pen Start 0.25 mg injected weekly for 4 weeks and if tolerating, increase to 0.5 mg weekly. 3 mL 0     STATIN NOT PRESCRIBED (INTENTIONAL) Please choose reason not prescribed from choices below.       roflumilast (DALIRESP) 250 MCG TABS tablet Take 1 tablet (250 mcg) by mouth daily for 28 days, THEN 2 tablets (500 mcg) daily for 340 days. (Patient not taking: Reported on 5/25/2023) 708 tablet 0     ALLERGIES  Allergies   Allergen Reactions     Interferons Dermatitis     Penicillins Hives     Aspirin      325mg      Colon Care       REVIEW OF SYSTEMS  As above in the HPI, o/w complete 12-point ROS was negative.    PHYSICAL EXAM  BP  137/77   Pulse 97   Temp 98.4  F (36.9  C) (Oral)   Resp 24   Wt 93.2 kg (205 lb 6.4 oz)   SpO2 90%   BMI 33.15 kg/m    GEN: NAD  HEENT: EOMI, no icterus, injection or pallor  NEURO: alert    LABORATORY AND IMAGING STUDIES    No new labs     PET Oncology Whole Body  Narrative: Combined Report of: PET and CT on 6/16/2023 9:55 AM:    1. PET of the neck, chest, abdomen, and pelvis.  2. PET CT Fusion for Attenuation Correction and Anatomical  Localization.  3. Diagnostic CT of the chest, abdomen and pelvis with intravenous  contrast obtained for diagnostic interpretation.  4. 3D MIP and PET-CT fused images were processed on an independent  workstation and archived to PACS and reviewed by a radiologist.    Technique:     1. PET: The patient received 12.32 mCi of F-18-FDG. The serum glucose  was 116 mg/dL prior to administration. Body weight was 93.4 kg. Images  were evaluated in the axial, sagittal, and coronal planes as well as  the rotational whole body MIP. Images were acquired from the cranial  vertex to the feet.    UPTAKE WAS MEASURED AT 67 MINUTES.     BACKGROUND: Liver SUV max = 3.3, Aorta Blood SUV max = 2.7.     2. CT: Volumetric acquisition for clinical interpretation of the  chest, abdomen, and pelvis acquired at 3 mm sections. The chest,  abdomen, and pelvis were evaluated at 5 mm sections in bone, soft  tissue, and lung windows.    Contrast and Medications:  IV contrast: 126 mL of Isovue 370 intravenously.  PO contrast: 500 mL oral Breeza contrast.  Additional Medications: None.    3. 3D MIP and PET-CT fused images were processed on an independent  workstation and archived to PACS and reviewed by a radiologist.    INDICATION: H/o Lung adenocarcinoma,IA2 [id number] poorly diff adeno  RLL, then [id number] IA2 suspected NSCLC RUL, medically inoperable s/p  SBRT in 2020. Watching RLL changes but now new peripheral  consolidation concerning for recurrence. Assess FDG avidity; Malignant  neoplasm of lower lobe  of right lung (H).    ADDITIONAL INFORMATION OBTAINED FROM EMR: 67-year-old woman with  history of IA2 [id number] poorly differentiated adenocarcinoma of the  right lower lobe and [id number] IA2 suspected NSCLC right upper lobe,  medically inoperable.  1/13/15 - CT lung bx. Adenocarcinoma  2/8/16 - R thoracotomy, RLL lobectomy. Adenocarcinoma, 1.1 cm,  associated with atypical adenomatous hyperplasia  10/18/19?- CTA showed new 1.3 cm RUL nodule  11/2019?-?PET/CT showed 1.1 cm RUL hypermetabolic nodule (SUV 12.6)  1/14~1/28/20?- SBRT to RUL nodule, 5000 cGy, every other day, 1000  cGy  11/29/21 -?CT chest. New 10 mm RUL nodule  1/11/22?- CT chest. New 7.2 mm RUL nodule, unchanged RUL nodules  3/31/22?- CT chest. New RUL nodule from Jan 2022 7-->10 mm, new 5 mm  ROBERT nodule  8/31/22?-?CT chest. 2.5 x 1.3 cm R midlung subpleural nodularity  (4:98) previously 2.3 x 1.1 cm, slightly increased solid component   10/5/22?-?PET/CT. 2 x 1.3 cm irregular opacity posterior RUL (SUV  2.46), 2.4 x 0.8 cm linear opacity (SUV 4.76); there was  bronchiectasia on prior imaging. Stable 1.1 x 0.8 cm non FDG avid RUL  opacity and unchanged 4 mm posterior RUL nodule. No adenopathy.  Inferior right breast uptake (SUV 6.09) likely infectious or  inflammatory.     COMPARISON: PET CT 10/5/2022; CT CAP 5/25/2023; outside PET/CT  11/4/2019.    FINDINGS:     HEAD/NECK:  No suspicious uptake in the neck.    The paranasal sinuses are clear. The mastoid air cells are clear.     No hypermetabolic lymph nodes are demonstrated in the neck.     The mucosal and deep spaces of the neck are unremarkable. The major  salivary glands are unremarkable. The thyroid is unremarkable. The  major vasculature of the neck are patent.    CHEST:  Intense uptake in the right peripheral spiculated lesion with SUV max  7.9 and MTV 16.3 cc, which has increased in size since CT dated  5/25/2023.    Hypermetabolic left upper lobe perivascular nodules and clustered  nodularity  are not significantly changed from PET CT dated 10/5/2022    Multiple hypermetabolic mediastinal and right axillary lymph nodes,  including:   - 1.0 cm pretracheal node (series 4, image 103) with SUV max 6.4,  previously 0.6 cm with SUV max 3.2 on PET/CT dated 10/5/2022   - 0.7 cm prevascular node (series 4, image 97) with SUV max 3.5,  previously 0.4 cm and non-FDG avid  - 0.9 cm right axillary node (series 4, image 111) with SUV max 6.9,  previously 0.5 cm and non-FDG avid    The central tracheobronchial tree is clear. No pleural effusion or  pneumothorax. No acute consolidation. Moderate apical predominant  centrilobular emphysematous changes. Right lower lobectomy changes  with mediastinal clips.    Heart size is within normal limits. No pericardial effusion. The  thoracic aorta and main pulmonary artery are within normal limits for  diameter. Normal variant common origin of the right brachiocephalic  and left common carotid artery. Atherosclerotic calcifications of the  aortic arch and origins of the great vessels. The esophagus is  unremarkable.     ABDOMEN AND PELVIS:  No suspicious uptake in the abdomen or pelvis.    The liver is unremarkable. The gallbladder is unremarkable. No  intrahepatic or extrahepatic biliary ductal dilatation. The pancreas  is unremarkable. No pancreatic ductal dilatation. The spleen is  unremarkable. The adrenal glands are unremarkable.    Symmetric enhancement of the kidneys. No hydronephrosis. The urinary  bladder is unremarkable. The reproductive organs are unremarkable.    Normal caliber of the small and large bowel. Colonic diverticulosis  without diverticulitis. No free air, free fluid, or fluid collection.  Normal caliber of the abdominal aorta with atherosclerotic  calcifications.    LOWER EXTREMITIES:   No suspicious uptake in the visualized lower extremities.    BONES:   No suspicious uptake in the skeleton. There are no suspicious lytic or  blastic osseous lesions.    Impression: IMPRESSION: In this patient with right lower lobe adenocarcinoma  status post lobectomy and right upper lobe suspected non-small cell  lung cancer status post radiation:     1. Increased size of the markedly hypermetabolic right upper lobe  spiculated mass, concerning for disease progression.    2. Increased size and avidity of multiple mediastinal and right  axillary lymph nodes, concerning for graciela metastases.    3. Left upper lobe hypermetabolic perivascular nodules and clustered  nodularities are not significantly changed from PET CT dated  10/5/2022.  Improved since 5/20/2022 suggesting sequela from prior  infection.    I have personally reviewed the examination and initial interpretation  and I agree with the findings.    RASHEEDA GARCIA MD         SYSTEM ID:  [id number]  CT Chest w Contrast  Narrative: Combined Report of: PET and CT on 6/16/2023 9:55 AM:    1. PET of the neck, chest, abdomen, and pelvis.  2. PET CT Fusion for Attenuation Correction and Anatomical  Localization.  3. Diagnostic CT of the chest, abdomen and pelvis with intravenous  contrast obtained for diagnostic interpretation.  4. 3D MIP and PET-CT fused images were processed on an independent  workstation and archived to PACS and reviewed by a radiologist.    Technique:     1. PET: The patient received 12.32 mCi of F-18-FDG. The serum glucose  was 116 mg/dL prior to administration. Body weight was 93.4 kg. Images  were evaluated in the axial, sagittal, and coronal planes as well as  the rotational whole body MIP. Images were acquired from the cranial  vertex to the feet.    UPTAKE WAS MEASURED AT 67 MINUTES.     BACKGROUND: Liver SUV max = 3.3, Aorta Blood SUV max = 2.7.     2. CT: Volumetric acquisition for clinical interpretation of the  chest, abdomen, and pelvis acquired at 3 mm sections. The chest,  abdomen, and pelvis were evaluated at 5 mm sections in bone, soft  tissue, and lung windows.    Contrast and  Medications:  IV contrast: 126 mL of Isovue 370 intravenously.  PO contrast: 500 mL oral Breeza contrast.  Additional Medications: None.    3. 3D MIP and PET-CT fused images were processed on an independent  workstation and archived to PACS and reviewed by a radiologist.    INDICATION: H/o Lung adenocarcinoma,IA2 [id number] poorly diff adeno  RLL, then [id number] IA2 suspected NSCLC RUL, medically inoperable s/p  SBRT in 2020. Watching RLL changes but now new peripheral  consolidation concerning for recurrence. Assess FDG avidity; Malignant  neoplasm of lower lobe of right lung (H).    ADDITIONAL INFORMATION OBTAINED FROM EMR: 67-year-old woman with  history of IA2 [id number] poorly differentiated adenocarcinoma of the  right lower lobe and [id number] IA2 suspected NSCLC right upper lobe,  medically inoperable.  1/13/15 - CT lung bx. Adenocarcinoma  2/8/16 - R thoracotomy, RLL lobectomy. Adenocarcinoma, 1.1 cm,  associated with atypical adenomatous hyperplasia  10/18/19?- CTA showed new 1.3 cm RUL nodule  11/2019?-?PET/CT showed 1.1 cm RUL hypermetabolic nodule (SUV 12.6)  1/14~1/28/20?- SBRT to RUL nodule, 5000 cGy, every other day, 1000  cGy  11/29/21 -?CT chest. New 10 mm RUL nodule  1/11/22?- CT chest. New 7.2 mm RUL nodule, unchanged RUL nodules  3/31/22?- CT chest. New RUL nodule from Jan 2022 7-->10 mm, new 5 mm  ROBERT nodule  8/31/22?-?CT chest. 2.5 x 1.3 cm R midlung subpleural nodularity  (4:98) previously 2.3 x 1.1 cm, slightly increased solid component   10/5/22?-?PET/CT. 2 x 1.3 cm irregular opacity posterior RUL (SUV  2.46), 2.4 x 0.8 cm linear opacity (SUV 4.76); there was  bronchiectasia on prior imaging. Stable 1.1 x 0.8 cm non FDG avid RUL  opacity and unchanged 4 mm posterior RUL nodule. No adenopathy.  Inferior right breast uptake (SUV 6.09) likely infectious or  inflammatory.     COMPARISON: PET CT 10/5/2022; CT CAP 5/25/2023; outside PET/CT  11/4/2019.    FINDINGS:     HEAD/NECK:  No suspicious uptake in  the neck.    The paranasal sinuses are clear. The mastoid air cells are clear.     No hypermetabolic lymph nodes are demonstrated in the neck.     The mucosal and deep spaces of the neck are unremarkable. The major  salivary glands are unremarkable. The thyroid is unremarkable. The  major vasculature of the neck are patent.    CHEST:  Intense uptake in the right peripheral spiculated lesion with SUV max  7.9 and MTV 16.3 cc, which has increased in size since CT dated  5/25/2023.    Hypermetabolic left upper lobe perivascular nodules and clustered  nodularity are not significantly changed from PET CT dated 10/5/2022    Multiple hypermetabolic mediastinal and right axillary lymph nodes,  including:   - 1.0 cm pretracheal node (series 4, image 103) with SUV max 6.4,  previously 0.6 cm with SUV max 3.2 on PET/CT dated 10/5/2022   - 0.7 cm prevascular node (series 4, image 97) with SUV max 3.5,  previously 0.4 cm and non-FDG avid  - 0.9 cm right axillary node (series 4, image 111) with SUV max 6.9,  previously 0.5 cm and non-FDG avid    The central tracheobronchial tree is clear. No pleural effusion or  pneumothorax. No acute consolidation. Moderate apical predominant  centrilobular emphysematous changes. Right lower lobectomy changes  with mediastinal clips.    Heart size is within normal limits. No pericardial effusion. The  thoracic aorta and main pulmonary artery are within normal limits for  diameter. Normal variant common origin of the right brachiocephalic  and left common carotid artery. Atherosclerotic calcifications of the  aortic arch and origins of the great vessels. The esophagus is  unremarkable.     ABDOMEN AND PELVIS:  No suspicious uptake in the abdomen or pelvis.    The liver is unremarkable. The gallbladder is unremarkable. No  intrahepatic or extrahepatic biliary ductal dilatation. The pancreas  is unremarkable. No pancreatic ductal dilatation. The spleen is  unremarkable. The adrenal glands are  unremarkable.    Symmetric enhancement of the kidneys. No hydronephrosis. The urinary  bladder is unremarkable. The reproductive organs are unremarkable.    Normal caliber of the small and large bowel. Colonic diverticulosis  without diverticulitis. No free air, free fluid, or fluid collection.  Normal caliber of the abdominal aorta with atherosclerotic  calcifications.    LOWER EXTREMITIES:   No suspicious uptake in the visualized lower extremities.    BONES:   No suspicious uptake in the skeleton. There are no suspicious lytic or  blastic osseous lesions.   Impression: IMPRESSION: In this patient with right lower lobe adenocarcinoma  status post lobectomy and right upper lobe suspected non-small cell  lung cancer status post radiation:     1. Increased size of the markedly hypermetabolic right upper lobe  spiculated mass, concerning for disease progression.    2. Increased size and avidity of multiple mediastinal and right  axillary lymph nodes, concerning for graciela metastases.    3. Left upper lobe hypermetabolic perivascular nodules and clustered  nodularities are not significantly changed from PET CT dated  10/5/2022.  Improved since 5/20/2022 suggesting sequela from prior  infection.    I have personally reviewed the examination and initial interpretation  and I agree with the findings.    RASHEEDA GARCIA MD         SYSTEM ID:  [id number]     Imaging was personally reviewed and interpreted by me as above

## 2023-06-20 NOTE — TELEPHONE ENCOUNTER
Spoke with pt and went over manometry testing instructions. Pt is on oxygen via nasal cannula, otherwise no other concerns regarding manometry testing.

## 2023-06-21 ENCOUNTER — ONCOLOGY VISIT (OUTPATIENT)
Dept: ONCOLOGY | Facility: CLINIC | Age: 68
End: 2023-06-21
Attending: INTERNAL MEDICINE
Payer: COMMERCIAL

## 2023-06-21 ENCOUNTER — PATIENT OUTREACH (OUTPATIENT)
Dept: ONCOLOGY | Facility: CLINIC | Age: 68
End: 2023-06-21

## 2023-06-21 VITALS
HEART RATE: 97 BPM | RESPIRATION RATE: 24 BRPM | TEMPERATURE: 98.4 F | BODY MASS INDEX: 33.15 KG/M2 | SYSTOLIC BLOOD PRESSURE: 137 MMHG | OXYGEN SATURATION: 90 % | WEIGHT: 205.4 LBS | DIASTOLIC BLOOD PRESSURE: 77 MMHG

## 2023-06-21 DIAGNOSIS — C34.31 MALIGNANT NEOPLASM OF LOWER LOBE OF RIGHT LUNG (H): Primary | ICD-10-CM

## 2023-06-21 PROCEDURE — G0463 HOSPITAL OUTPT CLINIC VISIT: HCPCS | Performed by: INTERNAL MEDICINE

## 2023-06-21 PROCEDURE — 99214 OFFICE O/P EST MOD 30 MIN: CPT | Performed by: INTERNAL MEDICINE

## 2023-06-21 ASSESSMENT — PAIN SCALES - GENERAL: PAINLEVEL: WORST PAIN (10)

## 2023-06-21 NOTE — NURSING NOTE
"Oncology Rooming Note    June 21, 2023 11:26 AM   Jenn Aparicio is a 67 year old female who presents for:    Chief Complaint   Patient presents with     Oncology Clinic Visit     Lung ca     Initial Vitals: /77   Pulse 97   Temp 98.4  F (36.9  C) (Oral)   Resp 24   Wt 93.2 kg (205 lb 6.4 oz)   SpO2 90%   BMI 33.15 kg/m   Estimated body mass index is 33.15 kg/m  as calculated from the following:    Height as of 5/15/23: 1.676 m (5' 6\").    Weight as of this encounter: 93.2 kg (205 lb 6.4 oz). Body surface area is 2.08 meters squared.  Worst Pain (10) Comment: Data Unavailable   No LMP recorded. Patient is postmenopausal.  Allergies reviewed: Yes  Medications reviewed: Yes    Medications: Medication refills not needed today.  Pharmacy name entered into EPIC:    [facility] [address] [id number] [place], [place] - [address] AT [facility] [address]  [facility] - [facility], [place] - [facility]    Clinical concerns:  none      Mary Guzmán"

## 2023-06-21 NOTE — LETTER
6/21/2023         RE: Jenn Aparicio  [address]  [place] [address]        Dear Colleague,    Thank you for referring your patient, Jenn Aparicio, to the [facility]. Please see a copy of my visit note below.       [facility]    PATIENT NAME: Jenn Aparicio  MRN # [id number]   DATE OF VISIT: June 21, 2023  YOB: 1955     CANCER TYPE: Lung adenocarcinoma  STAGE: IA2 [id number] poorly diff adeno RLL, then [id number] IA2 suspected NSCLC RUL, medically inoperable     ECOG PS: 2    PD-L1: N/A  Lung panel: N/A  NGS: N/A    SUMMARY  12/24/15 PET/CT  1/13/15 CT lung bx. Adenocarcinoma  2/8/16 R thoracotomy, RLL lobectomy (Dr. Cunha). Adenocarcinoma, 1.1 cm, assoicated with atypical adenomatous hyperplasia  10/18/19 CTA for shortness of breath. New 1.3 cm RUL nodule  11/2019 PET/CT. 1.1 cm RUL hypermetabolic nodule (SUV 12.6)  12/26/19 Brain MRI negative for mets  1/14~1/28/20 SBRT to RUL nodule, 5000 cGy, every other day, 1000 cGy (Dr. Currie at [facility] [place] No bx due to O2 dependence and too much risk  8/19/20 First visit with me   11/29/21 CT chest. New 10 mm RUL nodule  1/11/22 CT chest. New 7.2 mm RUL nodule, unchanged RUL nodules  3/31/22 CT chest. New RUL nodule from Jan 2022 7-->10 mm, new 5 mm ROBERT nodule  5/20~5/24/22 [facility] for COPD exacerbation.   6/24/22 CT chest non contrast.   8/16/22 EGD. 3 cm hiatal hernia, o/w normal  8/31/22 CT chest. 2.5 x 1.3 cm R midlung subpleural nodularity (4:98) previously 2.3 x 1.1 cm, slightly increased solid component   10/5/22 PET/CT. 2 x 1.3 cm irregular opacity posterior RUL (SUV 2.46), 2.4 x 0.8 cm linear opacity (SUV 4.76); there was bronchiectasia on prior imaging. Stable 1.1 x 0.8 cm non FDG avid RUL opacity and unchanged 4 mm posterior RUL nodule. No adenopathy. Inferior right breast uptake (SUV 6.09) likely infectious or inflammatory.   2/3~2/9/23 [facility] for COPD exacerbation  2/4/23 CTA during hospitalization. No  significant change in 2.3 x 1.5 cm spiculated nodule in the right upper lobe (series 7 image 94) with surrounding linear parenchymal opacities and subtle nodularity  2/20~2/24/23 [facility] for COPD exacerbation.   3/1/23 CT chest non contrast. 2.3 cm posterior RUL irregular nodule unchanged from 12/1/22 however slightly increased soft tissue component compared to 10/5/22 PET. Stable ROBERT mucous plugging with micronodularity.  3/22/23 CT chest. Stable compared to 3/1/23  5/25/23 CT chest abd w/contrast. Unchanged 1.3 x 0.5 cm R apical nodule, unchnaged 2.4 x 1.3 cm R upper nodular consolidation. New nodular/masslike area of consolidation anteriorly and laterally measuring 3.3 x 1.4 cm, not present on CT 3/22/23. No adenopathy. Consider PET or close surveillance CT in 3 months      ASSESSMENT AND PLAN  NSCLC, adeno, RUL: PET worrisome for recurrent disease, both at the site of prior SBRT and perhaps adenopathy. We reviewed the images together. Discussed that we're at a point where I'd like to move forward with figuring out what's going on with bx. I think a percutaneous approach will be difficult - there's a rib there and with the COPD, it becomes riskier. The other approach is endobronchially. I'd like both the 4R node and peripheral R lung mass to be bx'd. We had discussed at tumor board last year and even at that time, IP had thought it would be feasible. Will refer to them - they'll likely prefer to see her first. If they decide that's the most appropriate route, then I'll see her back 3 business days after the bx to review the results and discuss next steps. I don't think that going after the R axillary node is the best idea. Spent time discussing next steps and goals should we find the cancer has returned - we discussed this last year as well.  Surgery and most likely more radiation would not be realistically feasible due to the COPD, so we'd be left with systemic therapy, which is not considered curative, with the  caveats that we don't have any of the typical ancillary testing we routinely do nowadays that can certainly change prognosis and treatment options. Nonetheless, with this principle in mind, we balance potential toxicities and treatment differently than if we were in a curative situation, etc. She recalls the conversation we had last year regarding this.     COPD: Some recent exacerbations earlier this year, more stable recently. Not discussed today, has follow up    DM2: Seeing Dr. Shabazz.    Peripheral neuropathy: Not discussed today    Looking to get DME for walker and lift chair. I see appt notes attached to her upcoming appt with Mitzi Nettles refer to this, so will defer to her.     30 minutes spent by me on the date of the encounter doing chart review, history and exam, documentation and further activities per the note     Zarina Leung MD  Associate Professor of Medicine  Hematology, Oncology and Transplantation      SUBJECTIVE  Jenn returns for close interim follow up of R lung nodules. They were worrisome enough to get a PET. She's here to discuss the results.   Bday tomorrow! No major changes since our last visit. Stopped one of her insulins - still has a few lows but overall feeling better than before, less tired.     PAST MEDICAL HISTORY  Lung adenocarcinoma  DM2 with neuropathy  HCV IA, undetectable in 2019, treated  COPD. O2 dependent since late 2018. PFT 11/26/19. FEV1 0.64 (30%), FVC 1.69 (63%), DLCOunc 9.8 (38%).  HTN  H/o ITP  H/o disk herniation  Ankle surgery  Median neuropathy R  H/o sessile colon polyps 2014, h/o adenomas before that. Colonoscopy 2/7/18 @ [facility] [place]. Sessile serated adenoma x 1, tubular adenoma x 3  H/o chronic LBP  Dyslipidemia  Cataracts    CURRENT OUTPATIENT MEDICATIONS  Current Outpatient Medications   Medication Sig Dispense Refill    acetylcysteine (MUCOMYST) 10 % nebulizer solution Inhale 4 mLs into the lungs 4 times daily 480 mL 0    albuterol (PROAIR HFA/PROVENTIL  HFA/VENTOLIN HFA) 108 (90 Base) MCG/ACT inhaler USE 1 OR 2 INHALATIONS EVERY 4 HOURS AS NEEDED 17 g 3    albuterol (PROVENTIL) (2.5 MG/3ML) 0.083% neb solution Take 1 vial (2.5 mg) by nebulization 4 times daily 360 mL 0    Alpha Lipoic Acid 200 MG CAPS Take 1 capsule by mouth daily 90 capsule 3    amLODIPine (NORVASC) 10 MG tablet Take 1 tablet (10 mg) by mouth daily 90 tablet 3    ASPIRIN LOW DOSE 81 MG EC tablet TAKE 1 TABLET DAILY 90 tablet 2    buPROPion (WELLBUTRIN XL) 300 MG 24 hr tablet Take 1 tablet (300 mg) by mouth every morning 90 tablet 3    cetirizine (ZYRTEC) 10 MG tablet TAKE 1 TABLET DAILY 30 tablet 9    Continuous Blood Gluc Sensor (FREESTYLE GIOVANNI 2 SENSOR) MISC 1 each every 14 days For use with Freestyle Giovanni 2  for continuous monitioring of blood glucose levels. Replace sensor every 14 days. 2 each 11    cyanocobalamin (VITAMIN B-12) 500 MCG tablet Take 1 tablet (500 mcg) by mouth daily 90 tablet 1    doxycycline hyclate (VIBRA-TABS) 100 MG tablet Take 1 tablet (100 mg) by mouth 2 times daily 10 tablet 1    empagliflozin (JARDIANCE) 25 MG TABS tablet Take 1 tablet (25 mg) by mouth daily 90 tablet 1    fluticasone (FLONASE) 50 MCG/ACT nasal spray Spray 1 spray into both nostrils daily Use at night before bed 15.8 mL 7    Fluticasone-Umeclidin-Vilanterol (TRELEGY ELLIPTA) 200-62.5-25 MCG/INH oral inhaler Inhale 1 puff into the lungs daily 28 each 5    GLUCOSAMINE-CHONDROITIN -400 MG tablet TAKE 1 TABLET DAILY 90 tablet 3    insulin pen needle (BD MARCIO U/F) 32G X 4 MM miscellaneous Use 1 pen needle daily or as directed. 100 each 1    ipratropium - albuterol 0.5 mg/2.5 mg/3 mL (DUONEB) 0.5-2.5 (3) MG/3ML neb solution Take 1 vial by nebulization every 6 hours as needed for shortness of breath, wheezing or cough      ketorolac (ACULAR) 0.5 % ophthalmic solution Place 1 drop Into the left eye 4 times daily 5 mL 0    ketotifen (ZADITOR) 0.025 % ophthalmic solution Place 1 drop into both  eyes daily      moxifloxacin (VIGAMOX) 0.5 % ophthalmic solution Place 1 drop Into the left eye 4 times daily 3 mL 0    multivitamin w/minerals (THERA-VIT-M) tablet Take 1 tablet by mouth daily 30 tablet 11    nicotine (NICODERM CQ) 14 MG/24HR 24 hr patch Place 1 patch onto the skin every 24 hours      nicotine (NICORETTE) 4 MG lozenge Place 1 lozenge (4 mg) inside cheek every hour as needed for smoking cessation 100 lozenge 0    nicotine (NICOTROL) 10 MG inhaler Use 1 cartridge as needed for urge to smoke by puffing over course of 20min.  Use 6-16 cart/day; reduce number of cart/day over 6-12 weeks. 160 each 0    olopatadine (PATADAY) 0.2 % ophthalmic solution Place 0.05 mLs (1 drop) into both eyes daily 2.5 mL 11    omega-3 acid ethyl esters (LOVAZA) 1 g capsule Take 2 capsules (2 g) by mouth 2 times daily 360 capsule 3    omeprazole (PRILOSEC) 20 MG DR capsule Take 1 capsule (20 mg) by mouth 2 times daily 180 capsule 3    polyethylene glycol-propylene glycol (SYSTANE) 0.4-0.3 % SOLN ophthalmic solution Place 1 drop into both eyes 4 times daily 5 mL 11    polyethylene glycol-propylene glycol (SYSTANE) 0.4-0.3 % SOLN ophthalmic solution Place 1 drop into both eyes 4 times daily 5 mL 11    prednisoLONE acetate (PRED FORTE) 1 % ophthalmic suspension Place 1 drop Into the left eye 4 times daily 5 mL 0    predniSONE (DELTASONE) 20 MG tablet Take 2 tablets (40 mg) by mouth daily 10 tablet 3    pregabalin (LYRICA) 150 MG capsule Take 1 capsule (150 mg) by mouth 2 times daily 60 capsule 3    semaglutide (OZEMPIC) 2 MG/3ML pen Start 0.25 mg injected weekly for 4 weeks and if tolerating, increase to 0.5 mg weekly. 3 mL 0    STATIN NOT PRESCRIBED (INTENTIONAL) Please choose reason not prescribed from choices below.      roflumilast (DALIRESP) 250 MCG TABS tablet Take 1 tablet (250 mcg) by mouth daily for 28 days, THEN 2 tablets (500 mcg) daily for 340 days. (Patient not taking: Reported on 5/25/2023) 708 tablet 0      ALLERGIES  Allergies   Allergen Reactions    Interferons Dermatitis    Penicillins Hives    Aspirin      325mg     Colon Care       REVIEW OF SYSTEMS  As above in the HPI, o/w complete 12-point ROS was negative.    PHYSICAL EXAM  /77   Pulse 97   Temp 98.4  F (36.9  C) (Oral)   Resp 24   Wt 93.2 kg (205 lb 6.4 oz)   SpO2 90%   BMI 33.15 kg/m    GEN: NAD  HEENT: EOMI, no icterus, injection or pallor  NEURO: alert    LABORATORY AND IMAGING STUDIES    No new labs     PET Oncology Whole Body  Narrative: Combined Report of: PET and CT on 6/16/2023 9:55 AM:    1. PET of the neck, chest, abdomen, and pelvis.  2. PET CT Fusion for Attenuation Correction and Anatomical  Localization.  3. Diagnostic CT of the chest, abdomen and pelvis with intravenous  contrast obtained for diagnostic interpretation.  4. 3D MIP and PET-CT fused images were processed on an independent  workstation and archived to PACS and reviewed by a radiologist.    Technique:     1. PET: The patient received 12.32 mCi of F-18-FDG. The serum glucose  was 116 mg/dL prior to administration. Body weight was 93.4 kg. Images  were evaluated in the axial, sagittal, and coronal planes as well as  the rotational whole body MIP. Images were acquired from the cranial  vertex to the feet.    UPTAKE WAS MEASURED AT 67 MINUTES.     BACKGROUND: Liver SUV max = 3.3, Aorta Blood SUV max = 2.7.     2. CT: Volumetric acquisition for clinical interpretation of the  chest, abdomen, and pelvis acquired at 3 mm sections. The chest,  abdomen, and pelvis were evaluated at 5 mm sections in bone, soft  tissue, and lung windows.    Contrast and Medications:  IV contrast: 126 mL of Isovue 370 intravenously.  PO contrast: 500 mL oral Breeza contrast.  Additional Medications: None.    3. 3D MIP and PET-CT fused images were processed on an independent  workstation and archived to PACS and reviewed by a radiologist.    INDICATION: H/o Lung adenocarcinoma,IA2 [id number] poorly  diff adeno  RLL, then [id number] IA2 suspected NSCLC RUL, medically inoperable s/p  SBRT in 2020. Watching RLL changes but now new peripheral  consolidation concerning for recurrence. Assess FDG avidity; Malignant  neoplasm of lower lobe of right lung (H).    ADDITIONAL INFORMATION OBTAINED FROM EMR: 67-year-old woman with  history of IA2 [id number] poorly differentiated adenocarcinoma of the  right lower lobe and [id number] IA2 suspected NSCLC right upper lobe,  medically inoperable.  1/13/15 - CT lung bx. Adenocarcinoma  2/8/16 - R thoracotomy, RLL lobectomy. Adenocarcinoma, 1.1 cm,  associated with atypical adenomatous hyperplasia  10/18/19?- CTA showed new 1.3 cm RUL nodule  11/2019?-?PET/CT showed 1.1 cm RUL hypermetabolic nodule (SUV 12.6)  1/14~1/28/20?- SBRT to RUL nodule, 5000 cGy, every other day, 1000  cGy  11/29/21 -?CT chest. New 10 mm RUL nodule  1/11/22?- CT chest. New 7.2 mm RUL nodule, unchanged RUL nodules  3/31/22?- CT chest. New RUL nodule from Jan 2022 7-->10 mm, new 5 mm  ROBERT nodule  8/31/22?-?CT chest. 2.5 x 1.3 cm R midlung subpleural nodularity  (4:98) previously 2.3 x 1.1 cm, slightly increased solid component   10/5/22?-?PET/CT. 2 x 1.3 cm irregular opacity posterior RUL (SUV  2.46), 2.4 x 0.8 cm linear opacity (SUV 4.76); there was  bronchiectasia on prior imaging. Stable 1.1 x 0.8 cm non FDG avid RUL  opacity and unchanged 4 mm posterior RUL nodule. No adenopathy.  Inferior right breast uptake (SUV 6.09) likely infectious or  inflammatory.     COMPARISON: PET CT 10/5/2022; CT CAP 5/25/2023; outside PET/CT  11/4/2019.    FINDINGS:     HEAD/NECK:  No suspicious uptake in the neck.    The paranasal sinuses are clear. The mastoid air cells are clear.     No hypermetabolic lymph nodes are demonstrated in the neck.     The mucosal and deep spaces of the neck are unremarkable. The major  salivary glands are unremarkable. The thyroid is unremarkable. The  major vasculature of the neck are  patent.    CHEST:  Intense uptake in the right peripheral spiculated lesion with SUV max  7.9 and MTV 16.3 cc, which has increased in size since CT dated  5/25/2023.    Hypermetabolic left upper lobe perivascular nodules and clustered  nodularity are not significantly changed from PET CT dated 10/5/2022    Multiple hypermetabolic mediastinal and right axillary lymph nodes,  including:   - 1.0 cm pretracheal node (series 4, image 103) with SUV max 6.4,  previously 0.6 cm with SUV max 3.2 on PET/CT dated 10/5/2022   - 0.7 cm prevascular node (series 4, image 97) with SUV max 3.5,  previously 0.4 cm and non-FDG avid  - 0.9 cm right axillary node (series 4, image 111) with SUV max 6.9,  previously 0.5 cm and non-FDG avid    The central tracheobronchial tree is clear. No pleural effusion or  pneumothorax. No acute consolidation. Moderate apical predominant  centrilobular emphysematous changes. Right lower lobectomy changes  with mediastinal clips.    Heart size is within normal limits. No pericardial effusion. The  thoracic aorta and main pulmonary artery are within normal limits for  diameter. Normal variant common origin of the right brachiocephalic  and left common carotid artery. Atherosclerotic calcifications of the  aortic arch and origins of the great vessels. The esophagus is  unremarkable.     ABDOMEN AND PELVIS:  No suspicious uptake in the abdomen or pelvis.    The liver is unremarkable. The gallbladder is unremarkable. No  intrahepatic or extrahepatic biliary ductal dilatation. The pancreas  is unremarkable. No pancreatic ductal dilatation. The spleen is  unremarkable. The adrenal glands are unremarkable.    Symmetric enhancement of the kidneys. No hydronephrosis. The urinary  bladder is unremarkable. The reproductive organs are unremarkable.    Normal caliber of the small and large bowel. Colonic diverticulosis  without diverticulitis. No free air, free fluid, or fluid collection.  Normal caliber of the  abdominal aorta with atherosclerotic  calcifications.    LOWER EXTREMITIES:   No suspicious uptake in the visualized lower extremities.    BONES:   No suspicious uptake in the skeleton. There are no suspicious lytic or  blastic osseous lesions.   Impression: IMPRESSION: In this patient with right lower lobe adenocarcinoma  status post lobectomy and right upper lobe suspected non-small cell  lung cancer status post radiation:     1. Increased size of the markedly hypermetabolic right upper lobe  spiculated mass, concerning for disease progression.    2. Increased size and avidity of multiple mediastinal and right  axillary lymph nodes, concerning for graciela metastases.    3. Left upper lobe hypermetabolic perivascular nodules and clustered  nodularities are not significantly changed from PET CT dated  10/5/2022.  Improved since 5/20/2022 suggesting sequela from prior  infection.    I have personally reviewed the examination and initial interpretation  and I agree with the findings.    RASHEEDA GARCIA MD         SYSTEM ID:  [id number]  CT Chest w Contrast  Narrative: Combined Report of: PET and CT on 6/16/2023 9:55 AM:    1. PET of the neck, chest, abdomen, and pelvis.  2. PET CT Fusion for Attenuation Correction and Anatomical  Localization.  3. Diagnostic CT of the chest, abdomen and pelvis with intravenous  contrast obtained for diagnostic interpretation.  4. 3D MIP and PET-CT fused images were processed on an independent  workstation and archived to PACS and reviewed by a radiologist.    Technique:     1. PET: The patient received 12.32 mCi of F-18-FDG. The serum glucose  was 116 mg/dL prior to administration. Body weight was 93.4 kg. Images  were evaluated in the axial, sagittal, and coronal planes as well as  the rotational whole body MIP. Images were acquired from the cranial  vertex to the feet.    UPTAKE WAS MEASURED AT 67 MINUTES.     BACKGROUND: Liver SUV max = 3.3, Aorta Blood SUV max = 2.7.     2. CT:  Volumetric acquisition for clinical interpretation of the  chest, abdomen, and pelvis acquired at 3 mm sections. The chest,  abdomen, and pelvis were evaluated at 5 mm sections in bone, soft  tissue, and lung windows.    Contrast and Medications:  IV contrast: 126 mL of Isovue 370 intravenously.  PO contrast: 500 mL oral Breeza contrast.  Additional Medications: None.    3. 3D MIP and PET-CT fused images were processed on an independent  workstation and archived to PACS and reviewed by a radiologist.    INDICATION: H/o Lung adenocarcinoma,IA2 [id number] poorly diff adeno  RLL, then [id number] IA2 suspected NSCLC RUL, medically inoperable s/p  SBRT in 2020. Watching RLL changes but now new peripheral  consolidation concerning for recurrence. Assess FDG avidity; Malignant  neoplasm of lower lobe of right lung (H).    ADDITIONAL INFORMATION OBTAINED FROM EMR: 67-year-old woman with  history of IA2 [id number] poorly differentiated adenocarcinoma of the  right lower lobe and [id number] IA2 suspected NSCLC right upper lobe,  medically inoperable.  1/13/15 - CT lung bx. Adenocarcinoma  2/8/16 - R thoracotomy, RLL lobectomy. Adenocarcinoma, 1.1 cm,  associated with atypical adenomatous hyperplasia  10/18/19?- CTA showed new 1.3 cm RUL nodule  11/2019?-?PET/CT showed 1.1 cm RUL hypermetabolic nodule (SUV 12.6)  1/14~1/28/20?- SBRT to RUL nodule, 5000 cGy, every other day, 1000  cGy  11/29/21 -?CT chest. New 10 mm RUL nodule  1/11/22?- CT chest. New 7.2 mm RUL nodule, unchanged RUL nodules  3/31/22?- CT chest. New RUL nodule from Jan 2022 7-->10 mm, new 5 mm  ROBERT nodule  8/31/22?-?CT chest. 2.5 x 1.3 cm R midlung subpleural nodularity  (4:98) previously 2.3 x 1.1 cm, slightly increased solid component   10/5/22?-?PET/CT. 2 x 1.3 cm irregular opacity posterior RUL (SUV  2.46), 2.4 x 0.8 cm linear opacity (SUV 4.76); there was  bronchiectasia on prior imaging. Stable 1.1 x 0.8 cm non FDG avid RUL  opacity and unchanged 4 mm posterior  RUL nodule. No adenopathy.  Inferior right breast uptake (SUV 6.09) likely infectious or  inflammatory.     COMPARISON: PET CT 10/5/2022; CT CAP 5/25/2023; outside PET/CT  11/4/2019.    FINDINGS:     HEAD/NECK:  No suspicious uptake in the neck.    The paranasal sinuses are clear. The mastoid air cells are clear.     No hypermetabolic lymph nodes are demonstrated in the neck.     The mucosal and deep spaces of the neck are unremarkable. The major  salivary glands are unremarkable. The thyroid is unremarkable. The  major vasculature of the neck are patent.    CHEST:  Intense uptake in the right peripheral spiculated lesion with SUV max  7.9 and MTV 16.3 cc, which has increased in size since CT dated  5/25/2023.    Hypermetabolic left upper lobe perivascular nodules and clustered  nodularity are not significantly changed from PET CT dated 10/5/2022    Multiple hypermetabolic mediastinal and right axillary lymph nodes,  including:   - 1.0 cm pretracheal node (series 4, image 103) with SUV max 6.4,  previously 0.6 cm with SUV max 3.2 on PET/CT dated 10/5/2022   - 0.7 cm prevascular node (series 4, image 97) with SUV max 3.5,  previously 0.4 cm and non-FDG avid  - 0.9 cm right axillary node (series 4, image 111) with SUV max 6.9,  previously 0.5 cm and non-FDG avid    The central tracheobronchial tree is clear. No pleural effusion or  pneumothorax. No acute consolidation. Moderate apical predominant  centrilobular emphysematous changes. Right lower lobectomy changes  with mediastinal clips.    Heart size is within normal limits. No pericardial effusion. The  thoracic aorta and main pulmonary artery are within normal limits for  diameter. Normal variant common origin of the right brachiocephalic  and left common carotid artery. Atherosclerotic calcifications of the  aortic arch and origins of the great vessels. The esophagus is  unremarkable.     ABDOMEN AND PELVIS:  No suspicious uptake in the abdomen or pelvis.    The  liver is unremarkable. The gallbladder is unremarkable. No  intrahepatic or extrahepatic biliary ductal dilatation. The pancreas  is unremarkable. No pancreatic ductal dilatation. The spleen is  unremarkable. The adrenal glands are unremarkable.    Symmetric enhancement of the kidneys. No hydronephrosis. The urinary  bladder is unremarkable. The reproductive organs are unremarkable.    Normal caliber of the small and large bowel. Colonic diverticulosis  without diverticulitis. No free air, free fluid, or fluid collection.  Normal caliber of the abdominal aorta with atherosclerotic  calcifications.    LOWER EXTREMITIES:   No suspicious uptake in the visualized lower extremities.    BONES:   No suspicious uptake in the skeleton. There are no suspicious lytic or  blastic osseous lesions.   Impression: IMPRESSION: In this patient with right lower lobe adenocarcinoma  status post lobectomy and right upper lobe suspected non-small cell  lung cancer status post radiation:     1. Increased size of the markedly hypermetabolic right upper lobe  spiculated mass, concerning for disease progression.    2. Increased size and avidity of multiple mediastinal and right  axillary lymph nodes, concerning for graciela metastases.    3. Left upper lobe hypermetabolic perivascular nodules and clustered  nodularities are not significantly changed from PET CT dated  10/5/2022.  Improved since 5/20/2022 suggesting sequela from prior  infection.    I have personally reviewed the examination and initial interpretation  and I agree with the findings.    RASHEEDA GARCIA MD         SYSTEM ID:  [id number]     Imaging was personally reviewed and interpreted by me as above         Zarina Leung MD

## 2023-06-21 NOTE — PROGRESS NOTES
Urgent IB sent to the IP team to request sooner appointment and/or biopsy.    New Patient: Interventional Pulmonary (Lung nodule) Nurse Navigator Note    [facility]     Referred to (specialty): Interventional Pulmonary (Lung nodule)    Requested provider (if applicable): n/a    Date Referral Received: 6/21/2023    Evaluation for :  need for bronch/EBUS super D bx to pathologically prove recurrent lung adenocarcinoma   severe COPD, on O2 at home       Clinical History (per Nurse review of records provided):    **BOOK MARKED**      6/16/2023:  PET CT  IMPRESSION: In this patient with right lower lobe adenocarcinoma  status post lobectomy and right upper lobe suspected non-small cell  lung cancer status post radiation:      1. Increased size of the markedly hypermetabolic right upper lobe  spiculated mass, concerning for disease progression.     2. Increased size and avidity of multiple mediastinal and right  axillary lymph nodes, concerning for graciela metastases.     3. Left upper lobe hypermetabolic perivascular nodules and clustered  nodularities are not significantly changed from PET CT dated  10/5/2022.  Improved since 5/20/2022 suggesting sequela from prior  infection.  4/21/23     Records Location (Care Everywhere, Media, etc.): [facility]     Records Needed: none    Additional testing needed prior to consult: NONE

## 2023-06-26 ENCOUNTER — DOCUMENTATION ONLY (OUTPATIENT)
Dept: INTERNAL MEDICINE | Facility: CLINIC | Age: 68
End: 2023-06-26
Payer: COMMERCIAL

## 2023-06-26 NOTE — PROGRESS NOTES
Type of Form Received:     Form Received (Date) 6/26/23   Form Filled out Yes 7/6/23   Placed in provider folder Yes

## 2023-06-27 ENCOUNTER — PREP FOR PROCEDURE (OUTPATIENT)
Dept: MULTI SPECIALTY CLINIC | Facility: CLINIC | Age: 68
End: 2023-06-27

## 2023-06-27 ENCOUNTER — VIRTUAL VISIT (OUTPATIENT)
Dept: PULMONOLOGY | Facility: CLINIC | Age: 68
End: 2023-06-27
Payer: COMMERCIAL

## 2023-06-27 DIAGNOSIS — R91.1 LUNG NODULE: Primary | ICD-10-CM

## 2023-06-27 DIAGNOSIS — C34.31 MALIGNANT NEOPLASM OF LOWER LOBE OF RIGHT LUNG (H): ICD-10-CM

## 2023-06-27 PROCEDURE — 99442 PR PHYSICIAN TELEPHONE EVALUATION 11-20 MIN: CPT | Mod: 95 | Performed by: INTERNAL MEDICINE

## 2023-06-27 NOTE — PROGRESS NOTES
Virtual Visit Details    Type of service:  Telephone Visit     I spoke with Jenn and her family.  Dr. Leung has requested a biopsy based on her recent PET findings.    My discussions with Dr. Leung was that this may represent a pneumonia given the degree of change relative to previous.  However given the PET avidity, lymphadenopathy oncology prefers to proceed.    I reviewed with Jenn and her family the nature of the procedure.  The risks benefits including pneumothorax and chest tube placement.  We will repeat CT the day of procedure.  If there is a significant change we may be able to change our plan.    Plan for Ion bronchoscopy and endobronchial ultrasound with biopsy.    12 minutes spent on this telephone visit.    Earl Ureña MD

## 2023-06-27 NOTE — NURSING NOTE
Is the patient currently in the state of [place]? YES    Visit mode:TELEPHONE    If the visit is dropped, the patient can be reconnected by: TELEPHONE VISIT: Phone number: [phone]    Will anyone else be joining the visit? No  (If patient encounters technical issues they should call [phone])    How would you like to obtain your AVS? MyChart    Are changes needed to the allergy or medication list? NO    Rooming Documentation: Assigned questionnaire(s) completed .    Reason for visit: KOBE Prather

## 2023-06-28 ENCOUNTER — OFFICE VISIT (OUTPATIENT)
Dept: GASTROENTEROLOGY | Facility: CLINIC | Age: 68
End: 2023-06-28
Payer: COMMERCIAL

## 2023-06-28 VITALS
HEIGHT: 66 IN | DIASTOLIC BLOOD PRESSURE: 74 MMHG | WEIGHT: 205 LBS | SYSTOLIC BLOOD PRESSURE: 145 MMHG | RESPIRATION RATE: 16 BRPM | BODY MASS INDEX: 32.95 KG/M2 | HEART RATE: 92 BPM | OXYGEN SATURATION: 97 %

## 2023-06-28 DIAGNOSIS — R13.19 ESOPHAGEAL DYSPHAGIA: ICD-10-CM

## 2023-06-28 DIAGNOSIS — K22.4 ESOPHAGEAL DYSMOTILITY: Primary | ICD-10-CM

## 2023-06-28 PROCEDURE — 91037 ESOPH IMPED FUNCTION TEST: CPT

## 2023-06-28 PROCEDURE — 91010 ESOPHAGUS MOTILITY STUDY: CPT

## 2023-06-28 NOTE — PATIENT INSTRUCTIONS
Esophogeal Manometry Study  1. Resume regular diet.  2. You may have a bloody nose or sore throat after the procedure.  3. If you have questions call [phone] from 7:00am-5:00pm.  For afterhours questions call GI doctor on call at [phone].

## 2023-06-28 NOTE — PROGRESS NOTES
Non-Invasive GI Procedure Visit    Jenn Aparicio is a 68 year old female with history of Data Unavailable.   Patient stated reason for procedure: Dysphagia      COVID-19 PCR Results        6/22/2021    13:46 5/20/2022    19:54 6/29/2022    15:25 1/1/2023    11:17 2/4/2023    00:18   COVID-19 PCR Results   SARS-CoV-2 Virus Specimen Source Nasopharyngeal        SARS-CoV-2 PCR Result NEGATIVE        SARS CoV2 PCR  Negative  Negative  Negative  Negative            2/20/2023    19:48   COVID-19 PCR Results   SARS-CoV-2 Virus Specimen Source    SARS-CoV-2 PCR Result    SARS CoV2 PCR Negative    COVID-19 Antibody Results, Testing for Immunity         No data to display                Pre-Procedure Assessment  Patient presents to clinic today for Esophageal Manometry Study    Referring Provider: Dr. Travis Briseno  Patient has undergone previous endoscopy.    Does patient report taking a PPI (omeprazole, pantoprazole, rabeprazole, lansoprazole, esomeprazole, dexlansoprazole)? Yes. Is patient taking a PPI twice daily? Yes  Does patient report taking a H2 blocker (ranitidine, or famotidine)? No  Does patient report taking opioids? No  Patient reported that last food and/or drink was last consumed 12 hours ago.  Esophageal Questionnaire(s) Completed:   Yes - Esophageal Questionnaire(s)    BEDQ Questionnaire      6/28/2023     2:12 PM   BEDQ Questionnaire: How Often Have You Had the Following?   Trouble eating solid food (meat, bread, vegetables) 4   Trouble eating soft foods (yogurt, jello, pudding) 0   Trouble swallowing liquids 4   Pain while swallowing 0   Coughing or choking while swallowing foods or liquids 2   Total Score: 10         6/28/2023     2:12 PM   BEDQ Questionnaire: Discomfort/Pain Ratings   Eating solid food (meat, bread, vegetables) 0   Eating soft foods (yogurt, jello, pudding) 0   Drinking liquid 0   Total Score: 0       Eckardt Questionnaire      6/28/2023     2:12 PM   Eckardt Questionnaire   Dysphagia 2  "  Regurgitation 3   Retrosternal Pain 0   Weight Loss (kg) 0   Total Score:  5       Promis 10 Questionnaire       No data to display                Patient Hx  Patient's history, medications and allergies were reviewed.     Height: 5' 6\"   Weight: 205 lbs 0 oz    Patient Active Problem List    Diagnosis Date Noted     Combined forms of age-related cataract of both eyes 05/31/2023     Priority: Medium     Added automatically from request for surgery [id number]       COPD exacerbation (H) 01/01/2023     Priority: Medium     Acute and chronic respiratory failure with hypoxia (H) 01/01/2023     Priority: Medium     Gastroesophageal reflux disease without esophagitis 05/24/2022     Priority: Medium     Esophageal dysphagia 05/24/2022     Priority: Medium     Shortness of breath 05/21/2022     Priority: Medium     Chest pain, unspecified type 05/21/2022     Priority: Medium     Hypokalemia 05/21/2022     Priority: Medium     Hypoxia 05/20/2022     Priority: Medium     Diabetic neuropathy (H) 11/18/2021     Priority: Medium     Malnutrition (H) 06/25/2021     Priority: Medium     Chronic obstructive pulmonary disease, unspecified COPD type (H) 02/23/2021     Priority: Medium     Primary lung adenocarcinoma (H) STAGE: IA2 [id number] poorly diff adeno RLL, then [id number] IA2 suspected NSCLC RUL, medically inoperable  12/15/2020     Priority: Medium     Pulmonary emphysema (H) 09/22/2020     Priority: Medium     Type 2 diabetes mellitus without complication, without long-term current use of insulin (H) 01/09/2019     Priority: Medium     Cervical stenosis (uterine cervix) 03/18/2011     Priority: Medium     Vaginitis 03/14/2011     Priority: Medium     Postmenopausal bleeding 03/14/2011     Priority: Medium      Prior to Admission medications    Medication Sig Start Date End Date Taking? Authorizing Provider   acetylcysteine (MUCOMYST) 10 % nebulizer solution Inhale 4 mLs into the lungs 4 times daily 3/16/23   Mitzi Nettles, " APRN CNP   albuterol (PROAIR HFA/PROVENTIL HFA/VENTOLIN HFA) 108 (90 Base) MCG/ACT inhaler USE 1 OR 2 INHALATIONS EVERY 4 HOURS AS NEEDED 5/4/23   Mitzi Nettles APRN CNP   albuterol (PROVENTIL) (2.5 MG/3ML) 0.083% neb solution Take 1 vial (2.5 mg) by nebulization 4 times daily 3/16/23   Mitzi Nettles APRN CNP   Alpha Lipoic Acid 200 MG CAPS Take 1 capsule by mouth daily 5/4/23   Mitzi Nettles APRN CNP   amLODIPine (NORVASC) 10 MG tablet Take 1 tablet (10 mg) by mouth daily 6/1/22   Mitzi Nettles APRN CNP   ASPIRIN LOW DOSE 81 MG EC tablet TAKE 1 TABLET DAILY 5/24/23   Mitzi Nettles APRN CNP   buPROPion (WELLBUTRIN XL) 300 MG 24 hr tablet Take 1 tablet (300 mg) by mouth every morning 3/16/23   Mitzi Nettles APRN CNP   cetirizine (ZYRTEC) 10 MG tablet TAKE 1 TABLET DAILY 6/9/23   Mitzi Nettles APRN CNP   Continuous Blood Gluc Sensor (FREESTYLE MARTI 2 SENSOR) [facility] 1 each every 14 days For use with Freestyle Marti 2  for continuous monitioring of blood glucose levels. Replace sensor every 14 days. 5/4/23   Mitzi Nettles APRN CNP   cyanocobalamin (VITAMIN B-12) 500 MCG tablet Take 1 tablet (500 mcg) by mouth daily 4/24/23   La Nena Shabazz MD   doxycycline hyclate (VIBRA-TABS) 100 MG tablet Take 1 tablet (100 mg) by mouth 2 times daily 4/21/23   Kailee Moralez MD   empagliflozin (JARDIANCE) 25 MG TABS tablet Take 1 tablet (25 mg) by mouth daily 3/15/23   La Nena Shabazz MD   fluticasone (FLONASE) 50 MCG/ACT nasal spray Spray 1 spray into both nostrils daily Use at night before bed 5/4/23   Mitzi Nettles APRN CNP   Fluticasone-Umeclidin-Vilanterol (TRELEGY ELLIPTA) 200-62.5-25 MCG/INH oral inhaler Inhale 1 puff into the lungs daily 6/1/22   Mitzi Nettles APRN CNP   GLUCOSAMINE-CHONDROITIN -400 MG tablet TAKE 1 TABLET DAILY 4/20/23   Mizti Nettles APRN CNP   insulin pen needle (BD MARCIO U/F) 32G X 4 MM miscellaneous Use 1 pen needle  daily or as directed. 4/6/23   La Nena Shabazz MD   ipratropium - albuterol 0.5 mg/2.5 mg/3 mL (DUONEB) 0.5-2.5 (3) MG/3ML neb solution Take 1 vial by nebulization every 6 hours as needed for shortness of breath, wheezing or cough    Reported, Patient   ketorolac (ACULAR) 0.5 % ophthalmic solution Place 1 drop Into the left eye 4 times daily 5/26/23   Re Keita MD   ketotifen (ZADITOR) 0.025 % ophthalmic solution Place 1 drop into both eyes daily 4/11/22   Reported, Patient   moxifloxacin (VIGAMOX) 0.5 % ophthalmic solution Place 1 drop Into the left eye 4 times daily 5/26/23   Re Keita MD   multivitamin w/minerals (THERA-VIT-M) tablet Take 1 tablet by mouth daily 4/20/22   Mitzi Nettles APRN CNP   nicotine (NICODERM CQ) 14 MG/24HR 24 hr patch Place 1 patch onto the skin every 24 hours    Reported, Patient   nicotine (NICORETTE) 4 MG lozenge Place 1 lozenge (4 mg) inside cheek every hour as needed for smoking cessation 2/7/23   Edy Nash MD   nicotine (NICOTROL) 10 MG inhaler Use 1 cartridge as needed for urge to smoke by puffing over course of 20min.  Use 6-16 cart/day; reduce number of cart/day over 6-12 weeks. 2/7/23   Edy Nash MD   olopatadine (PATADAY) 0.2 % ophthalmic solution Place 0.05 mLs (1 drop) into both eyes daily 5/9/23 5/8/24  Joe Landers OD   omega-3 acid ethyl esters (LOVAZA) 1 g capsule Take 2 capsules (2 g) by mouth 2 times daily 4/26/23   Mitzi Nettles APRN CNP   omeprazole (PRILOSEC) 20 MG DR capsule Take 1 capsule (20 mg) by mouth 2 times daily 8/4/22   Travis Briseno MD   polyethylene glycol-propylene glycol (SYSTANE) 0.4-0.3 % SOLN ophthalmic solution Place 1 drop into both eyes 4 times daily 5/9/23   Joe Landers, OD   polyethylene glycol-propylene glycol (SYSTANE) 0.4-0.3 % SOLN ophthalmic solution Place 1 drop into both eyes 4 times daily 4/20/22   Mitzi Nettles, APRN CNP   prednisoLONE acetate (PRED FORTE) 1 % ophthalmic  suspension Place 1 drop Into the left eye 4 times daily 5/26/23   Re Keita MD   predniSONE (DELTASONE) 20 MG tablet Take 2 tablets (40 mg) by mouth daily 4/21/23   Kailee Moralez MD   pregabalin (LYRICA) 150 MG capsule Take 1 capsule (150 mg) by mouth 2 times daily 3/13/23   Mitzi Nettles APRN CNP   roflumilast (DALIRESP) 250 MCG TABS tablet Take 1 tablet (250 mcg) by mouth daily for 28 days, THEN 2 tablets (500 mcg) daily for 340 days.  Patient not taking: Reported on 5/25/2023 4/21/23 4/23/24  Kailee Moralez MD   semaglutide (OZEMPIC) 2 MG/3ML pen Start 0.25 mg injected weekly for 4 weeks and if tolerating, increase to 0.5 mg weekly. 5/26/23   La Nena Shabazz MD   STATIN NOT PRESCRIBED (INTENTIONAL) Please choose reason not prescribed from choices below. 8/31/22   Zarina Leung MD     Allergies   Allergen Reactions     Interferons Dermatitis     Penicillins Hives     Aspirin      325mg      Colon Care      Past Medical History:   Diagnosis Date     Adenocarcinoma, lung (H)      Asthma      Ectopic pregnancy      Esophageal reflux      Pulmonary emphysema (H)     Very severe FEV1<30% predicted     Type II diabetes mellitus (H)      Past Surgical History:   Procedure Laterality Date     2/8/16                R thoracotomy, RLL lobectomy (Dr. Cunha). Adenocarcinoma, 1.1 cm, assoicated with atypical adenomatous hyperplasia Right 02/08/2016    R thoracotomy, RLL lobectomy (Dr. Cunha). Adenocarcinoma, 1.1 cm, assoicated with atypical adenomatous hyperplasia     ESOPHAGOSCOPY, GASTROSCOPY, DUODENOSCOPY (EGD), COMBINED N/A 8/16/2022    Procedure: ESOPHAGOGASTRODUODENOSCOPY (EGD);  Surgeon: Travis Briseno MD;  Location:  GI     ORTHOPEDIC SURGERY       Family History   Problem Relation Age of Onset     Thyroid Disease Mother      Cerebrovascular Disease Mother      Hypertension Mother      Hypertension Father      Glaucoma Father      Diabetes Brother      Cancer Brother      Diabetes Brother       Cancer Brother      Diabetes Brother      Cancer Sister      Lung Cancer Sister      Depression Daughter      Alcohol/Drug Other         self     Diabetes Other         self     Thyroid Disease Other         self     Asthma Other         self     Macular Degeneration No family hx of      Social History     Tobacco Use     Smoking status: Former     Packs/day: 0.25     Types: Cigarettes     Smokeless tobacco: Never     Tobacco comments:     01/02/2023 Patient using 14 mg patch, wants to have prescription for Nicotrol inhaler, took workbook   Substance Use Topics     Alcohol use: Yes     Comment: sometime        Pre-Procedure Education & Consent  Procedure education was provided to: Patient  Teaching method: Explanation  Barriers to learning: No Barrier    Patient indicated understanding of pre-procedure instruction and appropriate consent was obtained and documented.    ____________________________________________________________________    Post-Procedure Documentation: Esophageal Manometry    Manometry catheter was placed via right nare to 51 cm and normal saline swallows given per protocol. Manometry catheter was removed at the end of test.    Discharge instructions given to patient.    Notification of pending test results sent to provider for interpretation. Please reference scanned document for final interpretation of results. Patient will follow up with referring provider for test results.    Gustavo Christine RN on 6/28/2023 at 2:14 PM

## 2023-06-29 ENCOUNTER — OFFICE VISIT (OUTPATIENT)
Dept: INTERNAL MEDICINE | Facility: CLINIC | Age: 68
End: 2023-06-29
Payer: COMMERCIAL

## 2023-06-29 ENCOUNTER — TRANSFERRED RECORDS (OUTPATIENT)
Dept: HEALTH INFORMATION MANAGEMENT | Facility: CLINIC | Age: 68
End: 2023-06-29

## 2023-06-29 ENCOUNTER — ALLIED HEALTH/NURSE VISIT (OUTPATIENT)
Dept: EDUCATION SERVICES | Facility: CLINIC | Age: 68
End: 2023-06-29
Payer: COMMERCIAL

## 2023-06-29 ENCOUNTER — DOCUMENTATION ONLY (OUTPATIENT)
Dept: INTERNAL MEDICINE | Facility: CLINIC | Age: 68
End: 2023-06-29

## 2023-06-29 VITALS
HEART RATE: 96 BPM | WEIGHT: 204.4 LBS | SYSTOLIC BLOOD PRESSURE: 126 MMHG | HEIGHT: 66 IN | BODY MASS INDEX: 32.85 KG/M2 | OXYGEN SATURATION: 89 % | DIASTOLIC BLOOD PRESSURE: 75 MMHG

## 2023-06-29 DIAGNOSIS — E11.42 DIABETIC PERIPHERAL NEUROPATHY (H): Primary | ICD-10-CM

## 2023-06-29 DIAGNOSIS — E11.42 TYPE 2 DIABETES MELLITUS WITH DIABETIC POLYNEUROPATHY, WITHOUT LONG-TERM CURRENT USE OF INSULIN (H): Primary | ICD-10-CM

## 2023-06-29 DIAGNOSIS — N39.42 URINARY INCONTINENCE WITHOUT SENSORY AWARENESS: ICD-10-CM

## 2023-06-29 PROCEDURE — G0108 DIAB MANAGE TRN  PER INDIV: HCPCS

## 2023-06-29 PROCEDURE — 99214 OFFICE O/P EST MOD 30 MIN: CPT | Performed by: NURSE PRACTITIONER

## 2023-06-29 NOTE — PATIENT INSTRUCTIONS
Keep wearing your Marti sensor.  Your blood sugar goals are: Before meals:   2 hrs after a meal: <180  We set the Libreview aydira up on your phone.  You can start your new sensor on the phone yadira and we will be able to see your bg results.  Keep making good food choices and move as much as you can.  Follow up with the pharmacist.      Nith[place] [address]  Phone: [phone]  [email or web address]

## 2023-06-29 NOTE — PROGRESS NOTES
Type of Form Received: [facility]     Form Received (Date) 06/29/23   Form Filled out Yes, 7/6/23   Placed in provider folder Yes

## 2023-06-29 NOTE — NURSING NOTE
Jenn Aparicio is a 68 year old female patient that presents today in clinic for the following:    Chief Complaint   Patient presents with     RECHECK     Medical supply orders     The patient's allergies and medications were reviewed as noted. A set of vitals were recorded as noted without incident. The patient does not have any other questions for the provider.    Drew Hunter, EMT at 1:40 PM on 6/29/2023

## 2023-06-29 NOTE — PROGRESS NOTES
S: Jenn Aparicio is a 68 year old female here to request orders for a lift chair and a replacement walker.    Her walker brakes are malfunctioning and it is no longer safe.    She has difficulty rising from a sitting position due to diabetic peripheral neuropathy,as well as generalized weakness and hypoxia from her pulmonary emphysema and hx of lung cancer, medically inoperable.     She does not have any bath grab bars and has difficulty getting out of the bathtub. She lives alone.       Patient Active Problem List   Diagnosis     Vaginitis     Postmenopausal bleeding     Cervical stenosis (uterine cervix)     Pulmonary emphysema (H)     Type 2 diabetes mellitus without complication, without long-term current use of insulin (H)     Primary lung adenocarcinoma (H) STAGE: IA2 [id number] poorly diff adeno RLL, then [id number] IA2 suspected NSCLC RUL, medically inoperable      Chronic obstructive pulmonary disease, unspecified COPD type (H)     Malnutrition (H)     Diabetic neuropathy (H)     Hypoxia     Shortness of breath     Chest pain, unspecified type     Hypokalemia     Gastroesophageal reflux disease without esophagitis     Esophageal dysphagia     COPD exacerbation (H)     Acute and chronic respiratory failure with hypoxia (H)     Combined forms of age-related cataract of both eyes          Past Medical History:   Diagnosis Date     Adenocarcinoma, lung (H)      Asthma      Ectopic pregnancy      Esophageal reflux      Pulmonary emphysema (H)     Very severe FEV1<30% predicted     Type II diabetes mellitus (H)             Past Surgical History:   Procedure Laterality Date     2/8/16                R thoracotomy, RLL lobectomy (Dr. Cunha). Adenocarcinoma, 1.1 cm, assoicated with atypical adenomatous hyperplasia Right 02/08/2016    R thoracotomy, RLL lobectomy (Dr. Cunha). Adenocarcinoma, 1.1 cm, assoicated with atypical adenomatous hyperplasia     ESOPHAGOSCOPY, GASTROSCOPY, DUODENOSCOPY (EGD), COMBINED N/A  8/16/2022    Procedure: ESOPHAGOGASTRODUODENOSCOPY (EGD);  Surgeon: Travis Briseno MD;  Location:  GI     ORTHOPEDIC SURGERY              Social History     Tobacco Use     Smoking status: Former     Packs/day: 0.25     Types: Cigarettes     Smokeless tobacco: Never     Tobacco comments:     01/02/2023 Patient using 14 mg patch, wants to have prescription for Nicotrol inhaler, took workbook   Substance Use Topics     Alcohol use: Yes     Comment: sometime            Family History   Problem Relation Age of Onset     Thyroid Disease Mother      Cerebrovascular Disease Mother      Hypertension Mother      Hypertension Father      Glaucoma Father      Diabetes Brother      Cancer Brother      Diabetes Brother      Cancer Brother      Diabetes Brother      Cancer Sister      Lung Cancer Sister      Depression Daughter      Alcohol/Drug Other         self     Diabetes Other         self     Thyroid Disease Other         self     Asthma Other         self     Macular Degeneration No family hx of                Allergies   Allergen Reactions     Interferons Dermatitis     Penicillins Hives     Aspirin      325mg      Colon Care             Current Outpatient Medications   Medication Sig Dispense Refill     acetylcysteine (MUCOMYST) 10 % nebulizer solution Inhale 4 mLs into the lungs 4 times daily 480 mL 0     albuterol (PROAIR HFA/PROVENTIL HFA/VENTOLIN HFA) 108 (90 Base) MCG/ACT inhaler USE 1 OR 2 INHALATIONS EVERY 4 HOURS AS NEEDED 17 g 3     albuterol (PROVENTIL) (2.5 MG/3ML) 0.083% neb solution Take 1 vial (2.5 mg) by nebulization 4 times daily 360 mL 0     Alpha Lipoic Acid 200 MG CAPS Take 1 capsule by mouth daily 90 capsule 3     amLODIPine (NORVASC) 10 MG tablet Take 1 tablet (10 mg) by mouth daily 90 tablet 3     ASPIRIN LOW DOSE 81 MG EC tablet TAKE 1 TABLET DAILY 90 tablet 2     buPROPion (WELLBUTRIN XL) 300 MG 24 hr tablet Take 1 tablet (300 mg) by mouth every morning 90 tablet 3     cetirizine (ZYRTEC) 10 MG  tablet TAKE 1 TABLET DAILY 30 tablet 9     Continuous Blood Gluc Sensor (FREESTYLE GIOVANNI 2 SENSOR) MISC 1 each every 14 days For use with Freestyle Giovanni 2  for continuous monitioring of blood glucose levels. Replace sensor every 14 days. 2 each 11     cyanocobalamin (VITAMIN B-12) 500 MCG tablet Take 1 tablet (500 mcg) by mouth daily 90 tablet 1     doxycycline hyclate (VIBRA-TABS) 100 MG tablet Take 1 tablet (100 mg) by mouth 2 times daily 10 tablet 1     empagliflozin (JARDIANCE) 25 MG TABS tablet Take 1 tablet (25 mg) by mouth daily 90 tablet 1     fluticasone (FLONASE) 50 MCG/ACT nasal spray Spray 1 spray into both nostrils daily Use at night before bed 15.8 mL 7     Fluticasone-Umeclidin-Vilanterol (TRELEGY ELLIPTA) 200-62.5-25 MCG/INH oral inhaler Inhale 1 puff into the lungs daily 28 each 5     GLUCOSAMINE-CHONDROITIN -400 MG tablet TAKE 1 TABLET DAILY 90 tablet 3     insulin pen needle (BD MARCIO U/F) 32G X 4 MM miscellaneous Use 1 pen needle daily or as directed. 100 each 1     ipratropium - albuterol 0.5 mg/2.5 mg/3 mL (DUONEB) 0.5-2.5 (3) MG/3ML neb solution Take 1 vial by nebulization every 6 hours as needed for shortness of breath, wheezing or cough       ketorolac (ACULAR) 0.5 % ophthalmic solution Place 1 drop Into the left eye 4 times daily 5 mL 0     ketotifen (ZADITOR) 0.025 % ophthalmic solution Place 1 drop into both eyes daily       moxifloxacin (VIGAMOX) 0.5 % ophthalmic solution Place 1 drop Into the left eye 4 times daily 3 mL 0     multivitamin w/minerals (THERA-VIT-M) tablet Take 1 tablet by mouth daily 30 tablet 11     nicotine (NICODERM CQ) 14 MG/24HR 24 hr patch Place 1 patch onto the skin every 24 hours       nicotine (NICORETTE) 4 MG lozenge Place 1 lozenge (4 mg) inside cheek every hour as needed for smoking cessation 100 lozenge 0     nicotine (NICOTROL) 10 MG inhaler Use 1 cartridge as needed for urge to smoke by puffing over course of 20min.  Use 6-16 cart/day; reduce  number of cart/day over 6-12 weeks. 160 each 0     olopatadine (PATADAY) 0.2 % ophthalmic solution Place 0.05 mLs (1 drop) into both eyes daily 2.5 mL 11     omega-3 acid ethyl esters (LOVAZA) 1 g capsule Take 2 capsules (2 g) by mouth 2 times daily 360 capsule 3     omeprazole (PRILOSEC) 20 MG DR capsule Take 1 capsule (20 mg) by mouth 2 times daily 180 capsule 3     polyethylene glycol-propylene glycol (SYSTANE) 0.4-0.3 % SOLN ophthalmic solution Place 1 drop into both eyes 4 times daily 5 mL 11     polyethylene glycol-propylene glycol (SYSTANE) 0.4-0.3 % SOLN ophthalmic solution Place 1 drop into both eyes 4 times daily 5 mL 11     prednisoLONE acetate (PRED FORTE) 1 % ophthalmic suspension Place 1 drop Into the left eye 4 times daily 5 mL 0     predniSONE (DELTASONE) 20 MG tablet Take 2 tablets (40 mg) by mouth daily 10 tablet 3     pregabalin (LYRICA) 150 MG capsule Take 1 capsule (150 mg) by mouth 2 times daily 60 capsule 3     roflumilast (DALIRESP) 250 MCG TABS tablet Take 1 tablet (250 mcg) by mouth daily for 28 days, THEN 2 tablets (500 mcg) daily for 340 days. (Patient not taking: Reported on 5/25/2023) 708 tablet 0     semaglutide (OZEMPIC) 2 MG/3ML pen Start 0.25 mg injected weekly for 4 weeks and if tolerating, increase to 0.5 mg weekly. 3 mL 0     STATIN NOT PRESCRIBED (INTENTIONAL) Please choose reason not prescribed from choices below.         REVIEW OF SYSTEMS:  See above.    O:   There were no vitals taken for this visit.  GENERAL APPEARANCE: healthy, alert and no distress  EYES: sclera white  RESP: decreased breath sounds throughout. lungs clear to auscultation .  CV: regular rates and rhythm, normal S1 S2, no S3 or S4 and no murmur, click or rub   ABDOMEN:  soft, nontender, no HSM or masses and bowel sounds normal  NEURO: alert and oriented.  Good historian.  MUSK: ambulatory.  EXT: warm.  Edema : no  PSYCHE: normal.     The 10-year ASCVD risk score (Chun DK, et al., 2019) is: 35.7%    Values  used to calculate the score:      Age: 68 years      Sex: Female      Is Non- : Yes      Diabetic: Yes      Tobacco smoker: No      Systolic Blood Pressure: 145 mmHg      Is BP treated: Yes      HDL Cholesterol: 58 mg/dL      Total Cholesterol: 251 mg/dL    A/P:    Jenn was seen today for recheck.    Diagnoses and all orders for this visit:    She has a hx of lung cancer with severe COPD and hypoxia limiting her ability to ambulate.  She also has peripheral diabetic neuropathy which makes ambulation tenuous.  I recommend she have a replacement walker with functional brakes, a lift chair, Grab bars and bathtub stool and hand held shower attachment so she can avoid sitting in the bottom of her tub.   Diabetic peripheral neuropathy (H)  -     Miscellaneous Order for DME - ONLY FOR DME  -     Miscellaneous Order for DME - ONLY FOR DME  -     Miscellaneous Order for DME - ONLY FOR DME    Urinary incontinence without sensory awareness  Orders were written for matress pad and incontinence pads.  -     Miscellaneous Order for DME - ONLY FOR DME  -     Miscellaneous Order for DME - ONLY FOR DME    The patient voiced understanding of the information discussed and all questions were answered.     I spent a total of  30  minutes in the care of this pt during today's office visit. This time includes reviewing the patient's chart and prior history, obtaining a history, performing an examination and evaluation and counseling the patient. This time also includes ordering medications or tests necessary in addition to communication to other member's of the patient's health care team. Time spent in documentation and care coordination is included.     Mitzi LABOY, CNP

## 2023-06-29 NOTE — PROGRESS NOTES
DIABETES SELF MANAGEMENT EDUCATION:   Jenn Aparicio presents today for education related to Type 2 diabetes.    She is accompanied by self  Referring Provider: Felisha Castro MD    DIABETES RELATED CONCERNS/COMMENTS:Past Diabetes Education: Yes  Support system: family  Living Situation: lives alone, just moved   Occupation: retired    ASSESSMENT:  Patient Problem List and Medication List reviewed for relevant medical history, current medical status, and diabetes risk factors.    Current Diabetes Management per Patient:  Jardiance 25 mg daily, Lantus 10 units daily  At risk for hypoglycemia? Yes , Symptoms: Shaky and Confusion and Frequency:3-4 times per week  BG meter: Marti 2 sensors   Patient glucose self monitoring as follows: has checked with blood sugars  BG results:           Patient's most recent A1C:   Lab Results   Component Value Date    A1C 9.8 08/31/2022    A1C 8.4 05/20/2022    A1C 8.4 01/27/2022    A1C 8.3 01/11/2022    A1C 7.0 02/24/2021    A1C 6.8 09/08/2020    A1C 7.1 12/01/2015     Nutrition:  Breakfast: (9-10a)  Or skips coffee, cream and raw sugar, skips  Snack: banana  Lunch:() leftovers, chicken wing with mashed potato and green beans, water  Snack: Mihir Lemon tea with sugar  Dinner: fish, veg and starch, water or tea  Snack:     Appetite has been going down over last year, started supplementing with Ensure but has stopped using them    Physical Activity:     minimal exercise, using walking    Diabetes Complications:  Numbness in hands or feet    INTERVENTION:   Education provided today on:  Jenn has gone off insulin and her blood sugar appears to be well controlled on her current meds.  No changes were made today.    We did set the phone yadira up today but could not connect the sensor because it was started with the reader.  She will use it with the next sensor.    PLAN:  No changes today    FOLLOW-UP:  Time Spent: 30 minutes  Encounter Type: Individual    Any diabetes medication dose  changes were made via the CDE Protocol and Collaborative Practice Agreement with  Physicians.

## 2023-07-03 NOTE — PROGRESS NOTES
S: Jenn Aparicio is a 68 year old female here to request orders for a lift chair and a replacement walker.    Her walker brakes are malfunctioning and it is no longer safe.    She has difficulty rising from a sitting position due to diabetic peripheral neuropathy,as well as generalized weakness and hypoxia from her pulmonary emphysema and hx of lung cancer, medically inoperable.     She does not have any bath grab bars and has difficulty getting out of the bathtub. She lives alone.       Patient Active Problem List   Diagnosis     Vaginitis     Postmenopausal bleeding     Cervical stenosis (uterine cervix)     Pulmonary emphysema (H)     Type 2 diabetes mellitus without complication, without long-term current use of insulin (H)     Primary lung adenocarcinoma (H) STAGE: IA2 [id number] poorly diff adeno RLL, then [id number] IA2 suspected NSCLC RUL, medically inoperable      Chronic obstructive pulmonary disease, unspecified COPD type (H)     Malnutrition (H)     Diabetic neuropathy (H)     Hypoxia     Shortness of breath     Chest pain, unspecified type     Hypokalemia     Gastroesophageal reflux disease without esophagitis     Esophageal dysphagia     COPD exacerbation (H)     Acute and chronic respiratory failure with hypoxia (H)     Combined forms of age-related cataract of both eyes          Past Medical History:   Diagnosis Date     Adenocarcinoma, lung (H)      Asthma      Ectopic pregnancy      Esophageal reflux      Pulmonary emphysema (H)     Very severe FEV1<30% predicted     Type II diabetes mellitus (H)             Past Surgical History:   Procedure Laterality Date     2/8/16                R thoracotomy, RLL lobectomy (Dr. Cunha). Adenocarcinoma, 1.1 cm, assoicated with atypical adenomatous hyperplasia Right 02/08/2016    R thoracotomy, RLL lobectomy (Dr. Cunha). Adenocarcinoma, 1.1 cm, assoicated with atypical adenomatous hyperplasia     ESOPHAGOSCOPY, GASTROSCOPY, DUODENOSCOPY (EGD), COMBINED N/A  8/16/2022    Procedure: ESOPHAGOGASTRODUODENOSCOPY (EGD);  Surgeon: Travis Briseno MD;  Location:  GI     ORTHOPEDIC SURGERY              Social History     Tobacco Use     Smoking status: Former     Packs/day: 0.25     Types: Cigarettes     Smokeless tobacco: Never     Tobacco comments:     01/02/2023 Patient using 14 mg patch, wants to have prescription for Nicotrol inhaler, took workbook   Substance Use Topics     Alcohol use: Yes     Comment: sometime            Family History   Problem Relation Age of Onset     Thyroid Disease Mother      Cerebrovascular Disease Mother      Hypertension Mother      Hypertension Father      Glaucoma Father      Diabetes Brother      Cancer Brother      Diabetes Brother      Cancer Brother      Diabetes Brother      Cancer Sister      Lung Cancer Sister      Depression Daughter      Alcohol/Drug Other         self     Diabetes Other         self     Thyroid Disease Other         self     Asthma Other         self     Macular Degeneration No family hx of                Allergies   Allergen Reactions     Interferons Dermatitis     Penicillins Hives     Aspirin      325mg      Colon Care             Current Outpatient Medications   Medication Sig Dispense Refill     acetylcysteine (MUCOMYST) 10 % nebulizer solution Inhale 4 mLs into the lungs 4 times daily 480 mL 0     albuterol (PROAIR HFA/PROVENTIL HFA/VENTOLIN HFA) 108 (90 Base) MCG/ACT inhaler USE 1 OR 2 INHALATIONS EVERY 4 HOURS AS NEEDED 17 g 3     albuterol (PROVENTIL) (2.5 MG/3ML) 0.083% neb solution Take 1 vial (2.5 mg) by nebulization 4 times daily 360 mL 0     Alpha Lipoic Acid 200 MG CAPS Take 1 capsule by mouth daily 90 capsule 3     amLODIPine (NORVASC) 10 MG tablet Take 1 tablet (10 mg) by mouth daily 90 tablet 3     ASPIRIN LOW DOSE 81 MG EC tablet TAKE 1 TABLET DAILY 90 tablet 2     buPROPion (WELLBUTRIN XL) 300 MG 24 hr tablet Take 1 tablet (300 mg) by mouth every morning 90 tablet 3     cetirizine (ZYRTEC) 10 MG  tablet TAKE 1 TABLET DAILY 30 tablet 9     Continuous Blood Gluc Sensor (FREESTYLE GIOVANNI 2 SENSOR) MISC 1 each every 14 days For use with Freestyle Giovanni 2  for continuous monitioring of blood glucose levels. Replace sensor every 14 days. 2 each 11     cyanocobalamin (VITAMIN B-12) 500 MCG tablet Take 1 tablet (500 mcg) by mouth daily 90 tablet 1     doxycycline hyclate (VIBRA-TABS) 100 MG tablet Take 1 tablet (100 mg) by mouth 2 times daily 10 tablet 1     empagliflozin (JARDIANCE) 25 MG TABS tablet Take 1 tablet (25 mg) by mouth daily 90 tablet 1     fluticasone (FLONASE) 50 MCG/ACT nasal spray Spray 1 spray into both nostrils daily Use at night before bed 15.8 mL 7     Fluticasone-Umeclidin-Vilanterol (TRELEGY ELLIPTA) 200-62.5-25 MCG/INH oral inhaler Inhale 1 puff into the lungs daily 28 each 5     GLUCOSAMINE-CHONDROITIN -400 MG tablet TAKE 1 TABLET DAILY 90 tablet 3     insulin pen needle (BD MARCIO U/F) 32G X 4 MM miscellaneous Use 1 pen needle daily or as directed. 100 each 1     ipratropium - albuterol 0.5 mg/2.5 mg/3 mL (DUONEB) 0.5-2.5 (3) MG/3ML neb solution Take 1 vial by nebulization every 6 hours as needed for shortness of breath, wheezing or cough       ketorolac (ACULAR) 0.5 % ophthalmic solution Place 1 drop Into the left eye 4 times daily 5 mL 0     ketotifen (ZADITOR) 0.025 % ophthalmic solution Place 1 drop into both eyes daily       moxifloxacin (VIGAMOX) 0.5 % ophthalmic solution Place 1 drop Into the left eye 4 times daily 3 mL 0     multivitamin w/minerals (THERA-VIT-M) tablet Take 1 tablet by mouth daily 30 tablet 11     nicotine (NICODERM CQ) 14 MG/24HR 24 hr patch Place 1 patch onto the skin every 24 hours       nicotine (NICORETTE) 4 MG lozenge Place 1 lozenge (4 mg) inside cheek every hour as needed for smoking cessation 100 lozenge 0     nicotine (NICOTROL) 10 MG inhaler Use 1 cartridge as needed for urge to smoke by puffing over course of 20min.  Use 6-16 cart/day; reduce  "number of cart/day over 6-12 weeks. 160 each 0     olopatadine (PATADAY) 0.2 % ophthalmic solution Place 0.05 mLs (1 drop) into both eyes daily 2.5 mL 11     omega-3 acid ethyl esters (LOVAZA) 1 g capsule Take 2 capsules (2 g) by mouth 2 times daily 360 capsule 3     omeprazole (PRILOSEC) 20 MG DR capsule Take 1 capsule (20 mg) by mouth 2 times daily 180 capsule 3     polyethylene glycol-propylene glycol (SYSTANE) 0.4-0.3 % SOLN ophthalmic solution Place 1 drop into both eyes 4 times daily 5 mL 11     polyethylene glycol-propylene glycol (SYSTANE) 0.4-0.3 % SOLN ophthalmic solution Place 1 drop into both eyes 4 times daily 5 mL 11     prednisoLONE acetate (PRED FORTE) 1 % ophthalmic suspension Place 1 drop Into the left eye 4 times daily 5 mL 0     predniSONE (DELTASONE) 20 MG tablet Take 2 tablets (40 mg) by mouth daily 10 tablet 3     pregabalin (LYRICA) 150 MG capsule Take 1 capsule (150 mg) by mouth 2 times daily 60 capsule 3     roflumilast (DALIRESP) 250 MCG TABS tablet Take 1 tablet (250 mcg) by mouth daily for 28 days, THEN 2 tablets (500 mcg) daily for 340 days. 708 tablet 0     semaglutide (OZEMPIC) 2 MG/3ML pen Start 0.25 mg injected weekly for 4 weeks and if tolerating, increase to 0.5 mg weekly. 3 mL 0     STATIN NOT PRESCRIBED (INTENTIONAL) Please choose reason not prescribed from choices below.         REVIEW OF SYSTEMS:  See above.    O:   /75 (BP Location: Left arm, Patient Position: Sitting, Cuff Size: Adult Regular)   Pulse 96   Ht 1.676 m (5' 5.98\")   Wt 92.7 kg (204 lb 6.4 oz)   SpO2 (!) 89%   BMI 33.01 kg/m    GENERAL APPEARANCE: healthy, alert and no distress  EYES: sclera white  RESP: decreased breath sounds throughout. lungs clear to auscultation .  CV: regular rates and rhythm, normal S1 S2, no S3 or S4 and no murmur, click or rub   ABDOMEN:  soft, nontender, no HSM or masses and bowel sounds normal  NEURO: alert and oriented.  Good historian.  MUSK: ambulatory.  EXT: warm.  " Edema : no  PSYCHE: normal.     The 10-year ASCVD risk score (Chun TENORIO, et al., 2019) is: 27.9%    Values used to calculate the score:      Age: 68 years      Sex: Female      Is Non- : Yes      Diabetic: Yes      Tobacco smoker: No      Systolic Blood Pressure: 126 mmHg      Is BP treated: Yes      HDL Cholesterol: 58 mg/dL      Total Cholesterol: 251 mg/dL    A/P:    Jenn was seen today for recheck.    Diagnoses and all orders for this visit:    She has a hx of lung cancer with severe COPD and hypoxia limiting her ability to ambulate.  She also has peripheral diabetic neuropathy which makes ambulation tenuous.  I recommend she have a replacement walker with functional brakes, a lift chair, Grab bars and bathtub stool and hand held shower attachment so she can avoid sitting in the bottom of her tub.   Diabetic peripheral neuropathy (H)  -     Miscellaneous Order for DME - ONLY FOR DME  -     Miscellaneous Order for DME - ONLY FOR DME  -     Miscellaneous Order for DME - ONLY FOR DME    Urinary incontinence without sensory awareness  Orders were written for matress pad and incontinence pads.  -     Miscellaneous Order for DME - ONLY FOR DME  -     Miscellaneous Order for DME - ONLY FOR DME    The patient voiced understanding of the information discussed and all questions were answered.     I spent a total of 30 minutes in the care of this pt during today's office visit. This time includes reviewing the patient's chart and prior history, obtaining a history, performing an examination and evaluation and counseling the patient. This time also includes ordering medications or tests necessary in addition to communication to other member's of the patient's health care team. Time spent in documentation and care coordination is included.     Mitzi LABOY, CNP

## 2023-07-05 ENCOUNTER — DOCUMENTATION ONLY (OUTPATIENT)
Dept: INTERNAL MEDICINE | Facility: CLINIC | Age: 68
End: 2023-07-05

## 2023-07-05 ENCOUNTER — MEDICAL CORRESPONDENCE (OUTPATIENT)
Dept: HEALTH INFORMATION MANAGEMENT | Facility: CLINIC | Age: 68
End: 2023-07-05

## 2023-07-05 ENCOUNTER — PRE VISIT (OUTPATIENT)
Dept: SURGERY | Facility: CLINIC | Age: 68
End: 2023-07-05

## 2023-07-05 NOTE — PROGRESS NOTES
Type of Form Received:     Form Received (Date) 7/5/23   Form Filled out Yes 7/6/23   Placed in provider folder Yes

## 2023-07-06 ENCOUNTER — MEDICAL CORRESPONDENCE (OUTPATIENT)
Dept: HEALTH INFORMATION MANAGEMENT | Facility: CLINIC | Age: 68
End: 2023-07-06

## 2023-07-06 ENCOUNTER — DOCUMENTATION ONLY (OUTPATIENT)
Dept: INTERNAL MEDICINE | Facility: CLINIC | Age: 68
End: 2023-07-06
Payer: COMMERCIAL

## 2023-07-06 ENCOUNTER — MEDICAL CORRESPONDENCE (OUTPATIENT)
Dept: HEALTH INFORMATION MANAGEMENT | Facility: CLINIC | Age: 68
End: 2023-07-06
Payer: COMMERCIAL

## 2023-07-06 DIAGNOSIS — Z53.9 DIAGNOSIS NOT YET DEFINED: Primary | ICD-10-CM

## 2023-07-06 PROCEDURE — G0179 MD RECERTIFICATION HHA PT: HCPCS | Performed by: NURSE PRACTITIONER

## 2023-07-06 NOTE — PROGRESS NOTES
Type of Form Received: Medical Necessity     Form Received (Date) 07/05/23   Form Filled out 7/6/23   Placed in provider folder Yes

## 2023-07-06 NOTE — PROGRESS NOTES
Type of Form Received:     Form Received (Date) 7/6/23   Form Filled out Yes, 7/11/23   Placed in provider folder Yes

## 2023-07-09 NOTE — PATIENT INSTRUCTIONS
Name:  Jenn Aparicio   MRN:  [id number]   :  1955   Today's Date:  7/10/2023         You were seen today for a pre-operative assessment in the:    Pre-operative Anesthesia Assessment Center(PAC)  [facility]  [address] [place] [id number]  phone [phone]      You will be receiving a call with location, date, arrival time and diet instructions from Gastrointestinal staff.            Anesthesia recommendations for medications:    Hold Aspirin for 2 days before procedure.  Hold Multivitamins for 7 days before procedure.   Hold Herbal medications and Supplements ( OMEGA 3 ) for 7 days before procedure.  Hold Ibuprofen for 1 day before procedure.   Hold Naproxen for 4 days before procedure.     No alcohol or cannabis products for 24 hours before your procedure     HOLD JARDIANCE X 3 DAYS BEFORE PROCEDURE    HOLD OZEMPIC ON 23.    Please DO NOT take the following medications the day of procedure:    ZYRTEC, VIBRA TABS, B12.    Please take these medications the day of procedure:    TYLENOL IF NEEDED; TAKE OTHER MORNING MEDICATIONS.  BRING INHALERS IF CURRENTLY USING.              For further questions regarding your surgery please call your surgeon's office.

## 2023-07-10 ENCOUNTER — PRE VISIT (OUTPATIENT)
Dept: SURGERY | Facility: CLINIC | Age: 68
End: 2023-07-10

## 2023-07-10 ENCOUNTER — VIRTUAL VISIT (OUTPATIENT)
Dept: SURGERY | Facility: CLINIC | Age: 68
End: 2023-07-10
Payer: COMMERCIAL

## 2023-07-10 ENCOUNTER — ANESTHESIA EVENT (OUTPATIENT)
Dept: GASTROENTEROLOGY | Facility: CLINIC | Age: 68
End: 2023-07-10
Payer: COMMERCIAL

## 2023-07-10 DIAGNOSIS — Z01.818 PRE-OP EVALUATION: Primary | ICD-10-CM

## 2023-07-10 PROCEDURE — 99204 OFFICE O/P NEW MOD 45 MIN: CPT | Mod: 95 | Performed by: PHYSICIAN ASSISTANT

## 2023-07-10 ASSESSMENT — COPD QUESTIONNAIRES
COPD: 1
CAT_SEVERITY: SEVERE

## 2023-07-10 ASSESSMENT — LIFESTYLE VARIABLES: TOBACCO_USE: 1

## 2023-07-10 ASSESSMENT — PAIN SCALES - GENERAL: PAINLEVEL: WORST PAIN (10)

## 2023-07-10 ASSESSMENT — ENCOUNTER SYMPTOMS: SEIZURES: 0

## 2023-07-10 NOTE — H&P
Pre-Operative H & P     CC:  Preoperative exam to assess for increased cardiopulmonary risk while undergoing surgery and anesthesia.    Date of Encounter: 7/10/2023  Primary Care Physician:  Mitzi Nettles     Reason for visit:   Encounter Diagnosis   Name Primary?     Pre-op evaluation Yes       HPI  Jenn Aparicio is a 68 year old female who presents for pre-operative H & P in preparation for  Procedure Information     Case: [id number] Date/Time: 08/08/23 1300    Procedure: Esophagoscopy, gastroscopy, duodenoscopy (EGD), combined (Esophagus)    Anesthesia type: Moderate Sedation    Diagnosis: Esophageal dysmotility [K22.4]    Pre-op diagnosis: Esophageal dysmotility [K22.4]    Location:  [facility] /  GI    Providers: Chao Rodrigues DO          Patient is being evaluated for comorbid conditions of severe COPD, former tobacco use, uncontrolled diabetes, obesity, GERD, lung cancer    Ms. Aparicio has a history of dysphagia. She follows with DR. Briseno and is s/p esophogram, EGD and escalated PPI therapy. She has no stenosis, but GERD and tertiary contractions. She is now scheduled for the above procedure.     History is obtained from the patient and chart review    Hx of abnormal bleeding or anti-platelet use: ASA    Menstrual history: No LMP recorded. Patient is postmenopausal.     Past Medical History  Past Medical History:   Diagnosis Date     Adenocarcinoma, lung (H)      Asthma      Ectopic pregnancy      Esophageal reflux      Pulmonary emphysema (H)     Very severe FEV1<30% predicted     Type II diabetes mellitus (H)        Past Surgical History  Past Surgical History:   Procedure Laterality Date     2/8/16                R thoracotomy, RLL lobectomy (Dr. Cunha). Adenocarcinoma, 1.1 cm, assoicated with atypical adenomatous hyperplasia Right 02/08/2016    R thoracotomy, RLL lobectomy (Dr. Cunha). Adenocarcinoma, 1.1 cm, assoicated with atypical adenomatous hyperplasia     ESOPHAGOSCOPY, GASTROSCOPY, DUODENOSCOPY  (EGD), COMBINED N/A 8/16/2022    Procedure: ESOPHAGOGASTRODUODENOSCOPY (EGD);  Surgeon: Travis Briseno MD;  Location:  GI     ORTHOPEDIC SURGERY         Prior to Admission Medications  Current Outpatient Medications   Medication Sig Dispense Refill     acetylcysteine (MUCOMYST) 10 % nebulizer solution Inhale 4 mLs into the lungs 4 times daily (Patient taking differently: Inhale 4 mLs into the lungs as needed) 480 mL 0     albuterol (PROAIR HFA/PROVENTIL HFA/VENTOLIN HFA) 108 (90 Base) MCG/ACT inhaler USE 1 OR 2 INHALATIONS EVERY 4 HOURS AS NEEDED (Patient taking differently: Inhale 1 puff into the lungs daily USE 1 OR 2 INHALATIONS EVERY 4 HOURS AS NEEDED) 17 g 3     albuterol (PROVENTIL) (2.5 MG/3ML) 0.083% neb solution Take 1 vial (2.5 mg) by nebulization 4 times daily 360 mL 0     Alpha Lipoic Acid 200 MG CAPS Take 1 capsule by mouth daily (Patient taking differently: Take 1 capsule by mouth every morning) 90 capsule 3     amLODIPine (NORVASC) 10 MG tablet Take 1 tablet (10 mg) by mouth daily (Patient taking differently: Take 10 mg by mouth every morning) 90 tablet 3     ASPIRIN LOW DOSE 81 MG EC tablet TAKE 1 TABLET DAILY (Patient taking differently: Take 81 mg by mouth every morning) 90 tablet 2     buPROPion (WELLBUTRIN XL) 300 MG 24 hr tablet Take 1 tablet (300 mg) by mouth every morning 90 tablet 3     cetirizine (ZYRTEC) 10 MG tablet TAKE 1 TABLET DAILY (Patient taking differently: Take 10 mg by mouth every morning) 30 tablet 9     cyanocobalamin (VITAMIN B-12) 500 MCG tablet Take 1 tablet (500 mcg) by mouth daily (Patient taking differently: Take 500 mcg by mouth every morning) 90 tablet 1     doxycycline hyclate (VIBRA-TABS) 100 MG tablet Take 1 tablet (100 mg) by mouth 2 times daily (Patient taking differently: Take 100 mg by mouth every morning) 10 tablet 1     empagliflozin (JARDIANCE) 25 MG TABS tablet Take 1 tablet (25 mg) by mouth daily (Patient taking differently: Take 25 mg by mouth every  morning) 90 tablet 1     fluticasone (FLONASE) 50 MCG/ACT nasal spray Spray 1 spray into both nostrils daily Use at night before bed (Patient taking differently: Spray 1 spray into both nostrils 2 times daily Use at night before bed) 15.8 mL 7     Fluticasone-Umeclidin-Vilanterol (TRELEGY ELLIPTA) 200-62.5-25 MCG/INH oral inhaler Inhale 1 puff into the lungs daily (Patient taking differently: Inhale 1 puff into the lungs every morning) 28 each 5     GLUCOSAMINE-CHONDROITIN -400 MG tablet TAKE 1 TABLET DAILY (Patient taking differently: Take 1 tablet by mouth every evening) 90 tablet 3     ipratropium - albuterol 0.5 mg/2.5 mg/3 mL (DUONEB) 0.5-2.5 (3) MG/3ML neb solution Take 1 vial by nebulization every 6 hours as needed for shortness of breath, wheezing or cough       ketorolac (ACULAR) 0.5 % ophthalmic solution Place 1 drop Into the left eye 4 times daily (Patient taking differently: Place 1 drop Into the left eye 2 times daily) 5 mL 0     ketotifen (ZADITOR) 0.025 % ophthalmic solution Place 1 drop into both eyes 2 times daily       multivitamin w/minerals (THERA-VIT-M) tablet Take 1 tablet by mouth daily (Patient taking differently: Take 1 tablet by mouth every morning) 30 tablet 11     nicotine (NICODERM CQ) 14 MG/24HR 24 hr patch Place 1 patch onto the skin every 24 hours       nicotine (NICORETTE) 4 MG lozenge Place 1 lozenge (4 mg) inside cheek every hour as needed for smoking cessation 100 lozenge 0     nicotine (NICOTROL) 10 MG inhaler Use 1 cartridge as needed for urge to smoke by puffing over course of 20min.  Use 6-16 cart/day; reduce number of cart/day over 6-12 weeks. 160 each 0     omega-3 acid ethyl esters (LOVAZA) 1 g capsule Take 2 capsules (2 g) by mouth 2 times daily (Patient taking differently: Take 1 g by mouth 2 times daily Take 2 capsules (2 g) by mouth 2 times daily) 360 capsule 3     omeprazole (PRILOSEC) 20 MG DR capsule Take 1 capsule (20 mg) by mouth 2 times daily 180 capsule 3      polyethylene glycol-propylene glycol (SYSTANE) 0.4-0.3 % SOLN ophthalmic solution Place 1 drop into both eyes 4 times daily (Patient taking differently: Place 1 drop into both eyes 3 times daily) 5 mL 11     pregabalin (LYRICA) 150 MG capsule Take 1 capsule (150 mg) by mouth 2 times daily 60 capsule 3     roflumilast (DALIRESP) 250 MCG TABS tablet Take 1 tablet (250 mcg) by mouth daily for 28 days, THEN 2 tablets (500 mcg) daily for 340 days. 708 tablet 0     semaglutide (OZEMPIC) 2 MG/3ML pen Start 0.25 mg injected weekly for 4 weeks and if tolerating, increase to 0.5 mg weekly. (Patient taking differently: Inject 0.5 mg Subcutaneous every 7 days Start 0.25 mg injected weekly for 4 weeks and if tolerating, increase to 0.5 mg weekly.  Every Monday's) 3 mL 0     Continuous Blood Gluc Sensor (FREESTYLE GIOVANNI 2 SENSOR) MISC 1 each every 14 days For use with Freestyle Giovanni 2  for continuous monitioring of blood glucose levels. Replace sensor every 14 days. 2 each 11     insulin pen needle (BD MARCIO U/F) 32G X 4 MM miscellaneous Use 1 pen needle daily or as directed. 100 each 1     moxifloxacin (VIGAMOX) 0.5 % ophthalmic solution Place 1 drop Into the left eye 4 times daily (Patient not taking: Reported on 7/10/2023) 3 mL 0     olopatadine (PATADAY) 0.2 % ophthalmic solution Place 0.05 mLs (1 drop) into both eyes daily (Patient not taking: Reported on 7/10/2023) 2.5 mL 11     polyethylene glycol-propylene glycol (SYSTANE) 0.4-0.3 % SOLN ophthalmic solution Place 1 drop into both eyes 4 times daily 5 mL 11     prednisoLONE acetate (PRED FORTE) 1 % ophthalmic suspension Place 1 drop Into the left eye 4 times daily (Patient not taking: Reported on 7/10/2023) 5 mL 0     predniSONE (DELTASONE) 20 MG tablet Take 2 tablets (40 mg) by mouth daily (Patient not taking: Reported on 7/10/2023) 10 tablet 3     STATIN NOT PRESCRIBED (INTENTIONAL) Please choose reason not prescribed from choices below.          Allergies  Allergies   Allergen Reactions     Interferons Dermatitis     Penicillins Hives     Aspirin      325mg      Colon Care        Social History  Social History     Socioeconomic History     Marital status: Single     Spouse name: Not on file     Number of children: Not on file     Years of education: Not on file     Highest education level: Not on file   Occupational History     Not on file   Tobacco Use     Smoking status: Former     Packs/day: 0.25     Types: Cigarettes     Smokeless tobacco: Never     Tobacco comments:     01/02/2023 Patient using 14 mg patch, wants to have prescription for Nicotrol inhaler, took workbook   Substance and Sexual Activity     Alcohol use: Not Currently     Drug use: No     Sexual activity: Not Currently     Partners: Male   Other Topics Concern     Not on file   Social History Narrative    Ms. Aparicio lives in an apartment complex by herself as of May 2022. She has frequent smoke exposure from neighbors even when she is not smoking herself.      Social Determinants of Health     Financial Resource Strain: Not on file   Food Insecurity: Not on file   Transportation Needs: Not on file   Physical Activity: Not on file   Stress: Not on file   Social Connections: Not on file   Intimate Partner Violence: Not At Risk (3/1/2023)    Humiliation, Afraid, Rape, and Kick questionnaire      Fear of Current or Ex-Partner: No      Emotionally Abused: No      Physically Abused: No      Sexually Abused: No   Housing Stability: Not on file       Family History  Family History   Problem Relation Age of Onset     Thyroid Disease Mother      Cerebrovascular Disease Mother      Hypertension Mother      Hypertension Father      Glaucoma Father      Cancer Sister      Lung Cancer Sister      Diabetes Brother      Cancer Brother      Diabetes Brother      Cancer Brother      Diabetes Brother      Deep Vein Thrombosis (DVT) Daughter      Depression Daughter      Alcohol/Drug Other         self      Diabetes Other         self     Thyroid Disease Other         self     Asthma Other         self     Macular Degeneration No family hx of      Anesthesia Reaction No family hx of        Review of Systems  The complete review of systems is negative other than noted in the HPI or here.   Anesthesia Evaluation   Pt has had prior anesthetic.     No history of anesthetic complications       ROS/MED HX  ENT/Pulmonary:     (+) sleep apnea (not using CPAP, but has one), doesn't use CPAP, tobacco use, Past use, severe,  COPD, O2 dependent,     Neurologic:  - neg neurologic ROS  (-) no seizures and no CVA   Cardiovascular:     (+) hypertension-----Taking blood thinners Previous cardiac testing   Echo: Date: 9/2022 Results:  Interpretation Summary  Left ventricular size, wall motion and function are normal. The ejection  fraction is 55-60%.     Right ventricular function, chamber size, wall motion, and thickness are  normal.     No pericardial effusion is present.     The inferior vena cava is normal.     No significant valvular abnormalities present.     There is no prior study for direct comparison.  Stress Test: Date: Results:    ECG Reviewed: Date: 2/20/23 Results:  Sinus tachycardia, septal infarct  Cath: Date: Results:      METS/Exercise Tolerance: 1 - Eating, dressing Comment: Limited activity. Can walk with walker but limited due to RUSSELL for past 2 years.    Hematologic:     (+) history of blood transfusion, no previous transfusion reaction,  (-) history of blood clots   Musculoskeletal:  - neg musculoskeletal ROS     GI/Hepatic: Comment: Esophageal dysmotility     (+) GERD, hepatitis resolved hepatitis type C,     Renal/Genitourinary:  - neg Renal ROS     Endo: Comment: Prednisone on hand, but no recent use    (+) type II DM, Last HgA1c: 10.5, date: 4/2023, Not using insulin, Normal glucose range: 50 at night, 150-200,     Psychiatric/Substance Use:  - neg psychiatric ROS     Infectious Disease:  - neg infectious  disease ROS     Malignancy:   (+) Malignancy, History of Lung.  Lung CA status post Radiation and Surgery.        Other:            Virtual visit -  No vitals were obtained    Physical Exam  Constitutional: Awake, alert, cooperative, no apparent distress, and appears stated age.  HENT: Normocephalic  Respiratory: non labored breathing   Neurologic: Awake, alert, oriented to name, place and time.   Neuropsychiatric: Calm, cooperative. Normal affect.      Prior Labs/Diagnostic Studies   All labs and imaging personally reviewed     EKG/ stress test - if available please see in ROS above   Echo result w/o MOPS: Interpretation SummaryLeft ventricular size, wall motion and function are normal. The ejectionfraction is 55-60%. Right ventricular function, chamber size, wall motion, and thickness arenormal. No pericardial effusion is present. The inferior vena cava is normal. No significant valvular abnormalities present. There is no prior study for direct comparison.          Latest Ref Rng & Units 4/21/2023     3:08 PM   PFT   FVC L 1.36    FEV1 L 0.55    FVC% % 46    FEV1% % 24          The patient's records and results personally reviewed by this provider.     Outside records reviewed from: Care Everywhere      Assessment      Jenn Aparicio is a 68 year old female seen as a PAC referral for risk assessment and optimization for anesthesia.    Plan/Recommendations  Pt will be optimized for the proposed procedure.  See below for details on the assessment, risk, and preoperative recommendations    NEUROLOGY  - No history of TIA, CVA or seizure  -Post Op delirium risk factors:  No risk identified    ENT  - No current airway concerns.  Will need to be reassessed day of surgery.  Mallampati: Unable to assess  TM: Unable to assess    CARDIAC  -hypertension using norvasc  -denies cardiac history.   -see previous cardiac testing above   - METS (Metabolic Equivalents)  Patient CANNOT perform 4 METS exercise without symptoms             "Total Score: 1    Functional Capacity: Unable to complete 4 METS      RCRI-Very low risk: Class 1 0.4% complication rate            Total Score: 0        PULMONARY  -DAVID- has CPAP, but not using recently   - COPD  severe. using 3L oxygen melina and night. Reports her symptoms feel at baseline   -review of vitals- SpO2 97% on 3L 6/28/23, 95% on 3L O2 6/13/23.   -h/o lung cancer s/p surgery and radiation. Possible recurrence vs. Pneumonia on recent imaging. Recommendation was made for biopsy, but not yet complete. Will message pulmonology to see if biopsy needed.   - Tobacco History      History   Smoking Status     Former     Packs/day: 0.25     Types: Cigarettes   Smokeless Tobacco     Never       GI  - GERD  -esophageal dysmotility with above procedure planned   PONV High Risk  Total Score: 3           1 AN PONV: Pt is Female    1 AN PONV: Patient is not a current smoker    1 AN PONV: Intended Post Op Opioids        /RENAL  - Baseline Creatinine WNL    ENDOCRINE    - BMI: Estimated body mass index is 33.01 kg/m  as calculated from the following:    Height as of 6/29/23: 1.676 m (5' 5.98\").    Weight as of 6/29/23: 92.7 kg (204 lb 6.4 oz).  Obesity (BMI >30)  - Diabetes  Hemoglobin A1C (%)   Date Value   04/17/2023 10.5 (H)   02/24/2021 7.0 (H)     Diabetes Mellitus, Type 2, non-insulin dependent.  Hold morning oral hypoglycemic medications. Recommend close monitoring of the patient's blood glucose levels throughout the perioperative period. Will hold ozempic x7 days. She has follow up with pharmacist to discuss diabetes medications per patient. She reports diabetic medications were changed after the A1c 10.7, and glucose now averages around 150.     HEME  VTE Medium Risk 1.8%            Total Score: 5    VTE: Greater than 59 yrs old    VTE: Family Hx of VTE      - Platelet disfunction second to Aspirin (Ramon, many others)    Different anesthesia methods/types have been discussed with the patient, but they are aware " that the final plan will be decided by the assigned anesthesia provider on the date of service.  Patient was discussed with Dr Tellez     The patient is optimized for their procedure. AVS with information on surgery time/arrival time, meds and NPO status given by nursing staff. No further diagnostic testing indicated.    Addendum 7/17: received message from pulmonology, and timing of biopsy should not effect EGD. Biopsy is scheduled 7/19 and okay to proceed.     Please refer to the physical examination documented by the anesthesiologist in the anesthesia record on the day of surgery.    Video-Visit Details    Type of service:  Video Visit    Provider received verbal consent for a Video Visit from the patient? Yes   Video Start Time: 0848  Video End Time:0908    Originating Location (pt. Location): Home    Distant Location (provider location):  Off-site  Mode of Communication:  Video Conference via [employer]  On the day of service:     Prep time: 17 minutes  Visit time: 20 minutes  Documentation time: 10 minutes  ------------------------------------------  Total time: 47 minutes      Nova Benitez PA-C  Preoperative Assessment Center  [facility]  Phone: [phone]  Fax: [phone]

## 2023-07-10 NOTE — PROGRESS NOTES
Jenn is a 68 year old who is being evaluated via a billable video visit.      How would you like to obtain your AVS? MyChart  If the video visit is dropped, the invitation should be resent by:         HPI                 Review of Systems               Physical Exam

## 2023-07-11 ENCOUNTER — TELEPHONE (OUTPATIENT)
Facility: CLINIC | Age: 68
End: 2023-07-11
Payer: COMMERCIAL

## 2023-07-11 DIAGNOSIS — Z53.9 DIAGNOSIS NOT YET DEFINED: Primary | ICD-10-CM

## 2023-07-11 NOTE — TELEPHONE ENCOUNTER
Writer contacted the patient to schedule ION procedure. Scheduled for 07/19 with Dr. Garcia. H&P completed 07/10. Packet information being sent via email per patient request.    William Mccormick on 7/11/2023 at 1:14 PM

## 2023-07-13 ENCOUNTER — VIRTUAL VISIT (OUTPATIENT)
Dept: PHARMACY | Facility: CLINIC | Age: 68
End: 2023-07-13
Payer: COMMERCIAL

## 2023-07-13 DIAGNOSIS — E11.9 TYPE 2 DIABETES MELLITUS WITHOUT COMPLICATION, WITHOUT LONG-TERM CURRENT USE OF INSULIN (H): Primary | ICD-10-CM

## 2023-07-13 DIAGNOSIS — Z72.0 TOBACCO ABUSE: ICD-10-CM

## 2023-07-13 DIAGNOSIS — E78.5 HYPERLIPIDEMIA, UNSPECIFIED HYPERLIPIDEMIA TYPE: ICD-10-CM

## 2023-07-13 PROCEDURE — 99607 MTMS BY PHARM ADDL 15 MIN: CPT | Performed by: PHARMACIST

## 2023-07-13 PROCEDURE — 99606 MTMS BY PHARM EST 15 MIN: CPT | Performed by: PHARMACIST

## 2023-07-13 RX ORDER — SEMAGLUTIDE 0.68 MG/ML
0.5 INJECTION, SOLUTION SUBCUTANEOUS WEEKLY
Qty: 3 ML | Refills: 11 | Status: SHIPPED | OUTPATIENT
Start: 2023-07-13 | End: 2023-10-27

## 2023-07-13 NOTE — PROGRESS NOTES
Medication Therapy Management (MTM) Encounter    ASSESSMENT:                            Medication Adherence/Access: No issues identified    Type 2 Diabetes: Patient is meeting goal of > 70% time in target with continuous glucose monitoring.      Smoking Cessation: patient has resumed abstinence and will continue medications for 12 weeks.      Hyperlipidemia: Patient is indicated for statin but still refuses.     PLAN:                            1. Continue to consider adding statin.     Follow-up: Return in about 13 weeks (around 10/12/2023) for Follow up, with me.      SUBJECTIVE/OBJECTIVE:                          Jenn Aparicio is a 68 year old female called for a follow-up visit from 6/15/23.       Reason for visit: diabetes follow-up.    Allergies/ADRs: Reviewed in chart  Past Medical History: Reviewed in chart  Tobacco: She reports that she has quit smoking. Her smoking use included cigarettes. She smoked an average of .25 packs per day. She has never used smokeless tobacco.  Alcohol: not discussed todauy      Medication Adherence/Access: no issues reported    Type 2 Diabetes:  Currently taking: Jardiance 25 mg daily,  Ozempic 0.5 mg weekly. Patient is not experiencing side effects.  Blood sugar monitoring: CGM. Ranges (patient reported): Time in Target  Last 7 days: Above 16% in target 84%.   Symptoms of low blood sugar? reports sometimes she gets shaky  Symptoms of high blood sugar? none  Eye exam: due  Foot exam: due  Aspirin: Taking 81mg daily for primary prevention   Statin: No   ACEi/ARB: No.         Urine Albumin:   Lab Results   Component Value Date    UMALCR 63.82 (H) 04/17/2023      Lab Results   Component Value Date    A1C 10.5 04/17/2023    A1C 9.4 01/23/2023    A1C 9.9 01/01/2023    A1C 9.8 08/31/2022    A1C 8.4 05/20/2022    A1C 7.0 02/24/2021    A1C 6.8 09/08/2020      Smoking Cessation: Current therapy include Wellbutrin 300 mg daily, nicotine patch 14 mg daily, and nicotine lozenges 4 mg as  needed. Patient reports she hasnt smoked in about 2 weeks. Did smoke since last time since she has had a lot of stress.      Hyperlipidemia: No current statin therapy. Patient reports that she has a lot going on and doesn't want to start a statin. Not open to adding medication.     Recent Labs   Lab Test 01/27/22  1439 01/11/22  0845   CHOL 251* 224*   HDL 58 55   * 149*   TRIG 127 102           Today's Vitals: There were no vitals taken for this visit.  ----------------    I spent 15 minutes with this patient today. All changes were made via collaborative practice agreement with BENOIT Ruiz CNP. A copy of the visit note was provided to the patient's provider(s).    A summary of these recommendations was mailed to the patient.    Samy Prasad, Pharm. D., [facility]  Medication Therapy Management Pharmacist    Telemedicine Visit Details  Type of service:  Telephone visit  Start Time: 9 am  End Time: 9:15 am     Medication Therapy Recommendations  Hyperlipidemia, unspecified hyperlipidemia type    Rationale: Untreated condition - Needs additional medication therapy - Indication   Recommendation: Start Medication   Status: Declined per Patient

## 2023-07-14 NOTE — PATIENT INSTRUCTIONS
"Recommendations from today's MTM visit:                                                    MTM (medication therapy management) is a service provided by a clinical pharmacist designed to help you get the most of out of your medicines.   Today we reviewed what your medicines are for, how to know if they are working, that your medicines are safe and how to make your medicine regimen as easy as possible.      1. Continue to consider adding statin.     Follow-up: Return in about 13 weeks (around 10/12/2023) for Follow up, with me.    It was great speaking with you today.  I value your experience and would be very thankful for your time in providing feedback in our clinic survey. In the next few days, you may receive an email or text message from [employer] with a link to a survey related to your  clinical pharmacist.\"     To schedule another MTM appointment, please call the clinic directly or you may call the MTM scheduling line at [phone] or toll-free at [phone].     My Clinical Pharmacist's contact information:                                                      Please feel free to contact me with any questions or concerns you have.      Samy Prasad, Pharm. D., BCACP  Medication Therapy Management Pharmacist     "

## 2023-07-17 ENCOUNTER — ANESTHESIA EVENT (OUTPATIENT)
Dept: SURGERY | Facility: CLINIC | Age: 68
End: 2023-07-17
Payer: COMMERCIAL

## 2023-07-17 ENCOUNTER — TELEPHONE (OUTPATIENT)
Dept: GASTROENTEROLOGY | Facility: CLINIC | Age: 68
End: 2023-07-17
Payer: COMMERCIAL

## 2023-07-17 DIAGNOSIS — I10 ESSENTIAL HYPERTENSION: Primary | ICD-10-CM

## 2023-07-17 DIAGNOSIS — K21.9 GASTROESOPHAGEAL REFLUX DISEASE, UNSPECIFIED WHETHER ESOPHAGITIS PRESENT: ICD-10-CM

## 2023-07-17 NOTE — TELEPHONE ENCOUNTER
Sarahr received a message from JOHANN TRAN to help get Pt get scheduled for a follow up appointment, per follow up order with Dr. Travis Briseno.     Writer called and talked with Pt. Pt is scheduled for an appointment with Dr. Briseno on 7/18/2023 at 10am for a video visit.

## 2023-07-18 ENCOUNTER — VIRTUAL VISIT (OUTPATIENT)
Dept: GASTROENTEROLOGY | Facility: CLINIC | Age: 68
End: 2023-07-18
Payer: COMMERCIAL

## 2023-07-18 VITALS — BODY MASS INDEX: 33.1 KG/M2 | WEIGHT: 205 LBS

## 2023-07-18 DIAGNOSIS — R13.10 DYSPHAGIA, UNSPECIFIED TYPE: Primary | ICD-10-CM

## 2023-07-18 DIAGNOSIS — K22.4 ESOPHAGEAL DYSMOTILITY: ICD-10-CM

## 2023-07-18 PROCEDURE — 99214 OFFICE O/P EST MOD 30 MIN: CPT | Mod: VID | Performed by: INTERNAL MEDICINE

## 2023-07-18 ASSESSMENT — PAIN SCALES - GENERAL: PAINLEVEL: NO PAIN (0)

## 2023-07-18 NOTE — NURSING NOTE
Is the patient currently in the state of [place]? YES    Visit mode:VIDEO    If the visit is dropped, the patient can be reconnected by: TELEPHONE VISIT: Phone number: [phone]    Will anyone else be joining the visit? NO      How would you like to obtain your AVS? MyChart    Are changes needed to the allergy or medication list? NO    Reason for visit: No chief complaint on file.

## 2023-07-18 NOTE — LETTER
7/18/2023         RE: Jenn Aparicio  [address]  [place] [address]        Dear Colleague,    Thank you for referring your patient, Jenn Aparicio, to the [facility] [place]. Please see a copy of my visit note below.    Jenn Aparicio is a 68 year old female who is being evaluated via a billable video visit.          GASTROENTEROLOGY CONSULTATION            Provider:   Travis Briseno MD    PCP:   Mitzi Nettles    Chief Complaint:    Dysphagia     Assessment:  Chronic dysphagia unclear whether this represents oropharyngeal versus esophageal dysphagia.  The patient has some degree of acid reflux in the setting of chronic emphysema.  She is really having a difficult time getting things to go down and they sometimes come back up.  Concern for possible Zenker's diverticulum oropharyngeal dysphagia does exist.  Upper endoscopy was unrevealing esophageal manometry was unrevealing and video esophagram did although revealed some mild tertiary contractions and free gastroesophageal reflux    Recommendations:   Video swallow with speech pathology   If negative would consider a EGD with placement of a Bravo probe after thorough consideration with the patient given her other medical problems     History of Present Illness:   This is a patient evidence following for the last few years with severe acid reflux associate with emphysema also dysphagia which has been difficult to pin down she had initially an esophagram which are demonstrated some tertiary contractions and EGD follow-up of this which did not demonstrate any abnormalities with exception of a hiatal hernia.  The patient went on to have an esophageal manometry recently which is read as normal.  The patient's continue to have difficulty swallowing sometimes regurgitating food back up is not sure from where when I talk about her chest or areas that she feels sensations it is difficult for her to answer that.  She  sometimes feels like she has a sore throat and cannot swallow.       Current Outpatient Medications   Medication Sig Dispense Refill    acetylcysteine (MUCOMYST) 10 % nebulizer solution Inhale 4 mLs into the lungs 4 times daily (Patient taking differently: Inhale 4 mLs into the lungs as needed) 480 mL 0    albuterol (PROAIR HFA/PROVENTIL HFA/VENTOLIN HFA) 108 (90 Base) MCG/ACT inhaler USE 1 OR 2 INHALATIONS EVERY 4 HOURS AS NEEDED (Patient taking differently: Inhale 1 puff into the lungs daily USE 1 OR 2 INHALATIONS EVERY 4 HOURS AS NEEDED) 17 g 3    albuterol (PROVENTIL) (2.5 MG/3ML) 0.083% neb solution Take 1 vial (2.5 mg) by nebulization 4 times daily 360 mL 0    Alpha Lipoic Acid 200 MG CAPS Take 1 capsule by mouth daily (Patient taking differently: Take 1 capsule by mouth every morning) 90 capsule 3    amLODIPine (NORVASC) 10 MG tablet Take 1 tablet (10 mg) by mouth daily (Patient taking differently: Take 10 mg by mouth every morning) 90 tablet 3    ASPIRIN LOW DOSE 81 MG EC tablet TAKE 1 TABLET DAILY (Patient taking differently: Take 81 mg by mouth every morning) 90 tablet 2    buPROPion (WELLBUTRIN XL) 300 MG 24 hr tablet Take 1 tablet (300 mg) by mouth every morning 90 tablet 3    cetirizine (ZYRTEC) 10 MG tablet TAKE 1 TABLET DAILY (Patient taking differently: Take 10 mg by mouth every morning) 30 tablet 9    Continuous Blood Gluc Sensor (FREESTYLE GIOVANNI 2 SENSOR) MISC 1 each every 14 days For use with Freestyle Giovanni 2  for continuous monitioring of blood glucose levels. Replace sensor every 14 days. 2 each 11    cyanocobalamin (VITAMIN B-12) 500 MCG tablet Take 1 tablet (500 mcg) by mouth daily (Patient taking differently: Take 500 mcg by mouth every morning) 90 tablet 1    doxycycline hyclate (VIBRA-TABS) 100 MG tablet Take 1 tablet (100 mg) by mouth 2 times daily (Patient taking differently: Take 100 mg by mouth every morning) 10 tablet 1    empagliflozin (JARDIANCE) 25 MG TABS tablet Take 1  tablet (25 mg) by mouth daily (Patient taking differently: Take 25 mg by mouth every morning) 90 tablet 1    fluticasone (FLONASE) 50 MCG/ACT nasal spray Spray 1 spray into both nostrils daily Use at night before bed (Patient taking differently: Spray 1 spray into both nostrils 2 times daily Use at night before bed) 15.8 mL 7    Fluticasone-Umeclidin-Vilanterol (TRELEGY ELLIPTA) 200-62.5-25 MCG/INH oral inhaler Inhale 1 puff into the lungs daily (Patient taking differently: Inhale 1 puff into the lungs every morning) 28 each 5    GLUCOSAMINE-CHONDROITIN -400 MG tablet TAKE 1 TABLET DAILY (Patient taking differently: Take 1 tablet by mouth every evening) 90 tablet 3    insulin pen needle (BD MARCIO U/F) 32G X 4 MM miscellaneous Use 1 pen needle daily or as directed. 100 each 1    ipratropium - albuterol 0.5 mg/2.5 mg/3 mL (DUONEB) 0.5-2.5 (3) MG/3ML neb solution Take 1 vial by nebulization every 6 hours as needed for shortness of breath, wheezing or cough      ketorolac (ACULAR) 0.5 % ophthalmic solution Place 1 drop Into the left eye 4 times daily (Patient taking differently: Place 1 drop Into the left eye 2 times daily) 5 mL 0    ketotifen (ZADITOR) 0.025 % ophthalmic solution Place 1 drop into both eyes 2 times daily      multivitamin w/minerals (THERA-VIT-M) tablet Take 1 tablet by mouth daily (Patient taking differently: Take 1 tablet by mouth every morning) 30 tablet 11    nicotine (NICODERM CQ) 14 MG/24HR 24 hr patch Place 1 patch onto the skin every 24 hours      nicotine (NICORETTE) 4 MG lozenge Place 1 lozenge (4 mg) inside cheek every hour as needed for smoking cessation 100 lozenge 0    nicotine (NICOTROL) 10 MG inhaler Use 1 cartridge as needed for urge to smoke by puffing over course of 20min.  Use 6-16 cart/day; reduce number of cart/day over 6-12 weeks. 160 each 0    omega-3 acid ethyl esters (LOVAZA) 1 g capsule Take 2 capsules (2 g) by mouth 2 times daily (Patient taking differently: Take 1 g by  mouth 2 times daily Take 2 capsules (2 g) by mouth 2 times daily) 360 capsule 3    omeprazole (PRILOSEC) 20 MG DR capsule TAKE 1 CAPSULE TWICE A  capsule 3    polyethylene glycol-propylene glycol (SYSTANE) 0.4-0.3 % SOLN ophthalmic solution Place 1 drop into both eyes 4 times daily 5 mL 11    polyethylene glycol-propylene glycol (SYSTANE) 0.4-0.3 % SOLN ophthalmic solution Place 1 drop into both eyes 4 times daily (Patient taking differently: Place 1 drop into both eyes 3 times daily) 5 mL 11    pregabalin (LYRICA) 150 MG capsule Take 1 capsule (150 mg) by mouth 2 times daily 60 capsule 3    roflumilast (DALIRESP) 250 MCG TABS tablet Take 1 tablet (250 mcg) by mouth daily for 28 days, THEN 2 tablets (500 mcg) daily for 340 days. 708 tablet 0    semaglutide (OZEMPIC, 0.25 OR 0.5 MG/DOSE,) 2 MG/3ML pen Inject 0.5 mg Subcutaneous once a week 3 mL 11    STATIN NOT PRESCRIBED (INTENTIONAL) Please choose reason not prescribed from choices below.      moxifloxacin (VIGAMOX) 0.5 % ophthalmic solution Place 1 drop Into the left eye 4 times daily (Patient not taking: Reported on 7/10/2023) 3 mL 0    olopatadine (PATADAY) 0.2 % ophthalmic solution Place 0.05 mLs (1 drop) into both eyes daily (Patient not taking: Reported on 7/10/2023) 2.5 mL 11    prednisoLONE acetate (PRED FORTE) 1 % ophthalmic suspension Place 1 drop Into the left eye 4 times daily (Patient not taking: Reported on 7/10/2023) 5 mL 0    predniSONE (DELTASONE) 20 MG tablet Take 2 tablets (40 mg) by mouth daily (Patient not taking: Reported on 7/10/2023) 10 tablet 3       Past Surgical History:   Procedure Laterality Date    2/8/16                R thoracotomy, RLL lobectomy (Dr. Cunha). Adenocarcinoma, 1.1 cm, assoicated with atypical adenomatous hyperplasia Right 02/08/2016    R thoracotomy, RLL lobectomy (Dr. Cunha). Adenocarcinoma, 1.1 cm, assoicated with atypical adenomatous hyperplasia    ESOPHAGOSCOPY, GASTROSCOPY, DUODENOSCOPY (EGD), COMBINED  N/A 8/16/2022    Procedure: ESOPHAGOGASTRODUODENOSCOPY (EGD);  Surgeon: Travis Briseno MD;  Location:  GI    ORTHOPEDIC SURGERY         Past Medical History:   Diagnosis Date    Adenocarcinoma, lung (H)     Asthma     Ectopic pregnancy     Esophageal reflux     Pulmonary emphysema (H)     Very severe FEV1<30% predicted    Type II diabetes mellitus (H)        Family History   Problem Relation Age of Onset    Thyroid Disease Mother     Cerebrovascular Disease Mother     Hypertension Mother     Hypertension Father     Glaucoma Father     Cancer Sister     Lung Cancer Sister     Diabetes Brother     Cancer Brother     Diabetes Brother     Cancer Brother     Diabetes Brother     Deep Vein Thrombosis (DVT) Daughter     Depression Daughter     Alcohol/Drug Other         self    Diabetes Other         self    Thyroid Disease Other         self    Asthma Other         self    Macular Degeneration No family hx of     Anesthesia Reaction No family hx of         reports that she has quit smoking. Her smoking use included cigarettes. She smoked an average of .25 packs per day. She has never used smokeless tobacco. She reports that she does not currently use alcohol. She reports that she does not use drugs.         Physical Exam:   Wt 93 kg (205 lb)   BMI 33.10 kg/m    Wt:   Wt Readings from Last 2 Encounters:   07/18/23 93 kg (205 lb)   06/29/23 92.7 kg (204 lb 6.4 oz)      Constitutional: cooperative, pleasant, not dyspneic/diaphoretic, no acute distress  E    Wt Readings from Last 2 Encounters:   07/18/23 93 kg (205 lb)   06/29/23 92.7 kg (204 lb 6.4 oz)         Again, thank you for allowing me to participate in the care of your patient.      Sincerely,    Travis Briseno MD

## 2023-07-18 NOTE — PROGRESS NOTES
Jenn Aparicio is a 68 year old female who is being evaluated via a billable video visit.      Virtual Visit Details    Type of service:  Video Visit   Video Start Time: 10:21 AM  Video End Time:10:31 AM    Originating Location (pt. Location): Home    Distant Location (provider location):  On-site  Platform used for Video Visit: [facility]       GASTROENTEROLOGY CONSULTATION            Provider:   Travis Briseno MD    PCP:   Mitzi Nettles    Chief Complaint:    Dysphagia     Assessment:  Chronic dysphagia unclear whether this represents oropharyngeal versus esophageal dysphagia.  The patient has some degree of acid reflux in the setting of chronic emphysema.  She is really having a difficult time getting things to go down and they sometimes come back up.  Concern for possible Zenker's diverticulum oropharyngeal dysphagia does exist.  Upper endoscopy was unrevealing esophageal manometry was unrevealing and video esophagram did although revealed some mild tertiary contractions and free gastroesophageal reflux    Recommendations:   Video swallow with speech pathology   If negative would consider a EGD with placement of a Bravo probe after thorough consideration with the patient given her other medical problems     History of Present Illness:   This is a patient evidence following for the last few years with severe acid reflux associate with emphysema also dysphagia which has been difficult to pin down she had initially an esophagram which are demonstrated some tertiary contractions and EGD follow-up of this which did not demonstrate any abnormalities with exception of a hiatal hernia.  The patient went on to have an esophageal manometry recently which is read as normal.  The patient's continue to have difficulty swallowing sometimes regurgitating food back up is not sure from where when I talk about her chest or areas that she feels sensations it is difficult for her to answer that.  She sometimes feels like she has a sore  throat and cannot swallow.       Current Outpatient Medications   Medication Sig Dispense Refill     acetylcysteine (MUCOMYST) 10 % nebulizer solution Inhale 4 mLs into the lungs 4 times daily (Patient taking differently: Inhale 4 mLs into the lungs as needed) 480 mL 0     albuterol (PROAIR HFA/PROVENTIL HFA/VENTOLIN HFA) 108 (90 Base) MCG/ACT inhaler USE 1 OR 2 INHALATIONS EVERY 4 HOURS AS NEEDED (Patient taking differently: Inhale 1 puff into the lungs daily USE 1 OR 2 INHALATIONS EVERY 4 HOURS AS NEEDED) 17 g 3     albuterol (PROVENTIL) (2.5 MG/3ML) 0.083% neb solution Take 1 vial (2.5 mg) by nebulization 4 times daily 360 mL 0     Alpha Lipoic Acid 200 MG CAPS Take 1 capsule by mouth daily (Patient taking differently: Take 1 capsule by mouth every morning) 90 capsule 3     amLODIPine (NORVASC) 10 MG tablet Take 1 tablet (10 mg) by mouth daily (Patient taking differently: Take 10 mg by mouth every morning) 90 tablet 3     ASPIRIN LOW DOSE 81 MG EC tablet TAKE 1 TABLET DAILY (Patient taking differently: Take 81 mg by mouth every morning) 90 tablet 2     buPROPion (WELLBUTRIN XL) 300 MG 24 hr tablet Take 1 tablet (300 mg) by mouth every morning 90 tablet 3     cetirizine (ZYRTEC) 10 MG tablet TAKE 1 TABLET DAILY (Patient taking differently: Take 10 mg by mouth every morning) 30 tablet 9     Continuous Blood Gluc Sensor (FREESTYLE GIOVANNI 2 SENSOR) MISC 1 each every 14 days For use with Freestyle Giovanni 2  for continuous monitioring of blood glucose levels. Replace sensor every 14 days. 2 each 11     cyanocobalamin (VITAMIN B-12) 500 MCG tablet Take 1 tablet (500 mcg) by mouth daily (Patient taking differently: Take 500 mcg by mouth every morning) 90 tablet 1     doxycycline hyclate (VIBRA-TABS) 100 MG tablet Take 1 tablet (100 mg) by mouth 2 times daily (Patient taking differently: Take 100 mg by mouth every morning) 10 tablet 1     empagliflozin (JARDIANCE) 25 MG TABS tablet Take 1 tablet (25 mg) by mouth  daily (Patient taking differently: Take 25 mg by mouth every morning) 90 tablet 1     fluticasone (FLONASE) 50 MCG/ACT nasal spray Spray 1 spray into both nostrils daily Use at night before bed (Patient taking differently: Spray 1 spray into both nostrils 2 times daily Use at night before bed) 15.8 mL 7     Fluticasone-Umeclidin-Vilanterol (TRELEGY ELLIPTA) 200-62.5-25 MCG/INH oral inhaler Inhale 1 puff into the lungs daily (Patient taking differently: Inhale 1 puff into the lungs every morning) 28 each 5     GLUCOSAMINE-CHONDROITIN -400 MG tablet TAKE 1 TABLET DAILY (Patient taking differently: Take 1 tablet by mouth every evening) 90 tablet 3     insulin pen needle (BD MARCIO U/F) 32G X 4 MM miscellaneous Use 1 pen needle daily or as directed. 100 each 1     ipratropium - albuterol 0.5 mg/2.5 mg/3 mL (DUONEB) 0.5-2.5 (3) MG/3ML neb solution Take 1 vial by nebulization every 6 hours as needed for shortness of breath, wheezing or cough       ketorolac (ACULAR) 0.5 % ophthalmic solution Place 1 drop Into the left eye 4 times daily (Patient taking differently: Place 1 drop Into the left eye 2 times daily) 5 mL 0     ketotifen (ZADITOR) 0.025 % ophthalmic solution Place 1 drop into both eyes 2 times daily       multivitamin w/minerals (THERA-VIT-M) tablet Take 1 tablet by mouth daily (Patient taking differently: Take 1 tablet by mouth every morning) 30 tablet 11     nicotine (NICODERM CQ) 14 MG/24HR 24 hr patch Place 1 patch onto the skin every 24 hours       nicotine (NICORETTE) 4 MG lozenge Place 1 lozenge (4 mg) inside cheek every hour as needed for smoking cessation 100 lozenge 0     nicotine (NICOTROL) 10 MG inhaler Use 1 cartridge as needed for urge to smoke by puffing over course of 20min.  Use 6-16 cart/day; reduce number of cart/day over 6-12 weeks. 160 each 0     omega-3 acid ethyl esters (LOVAZA) 1 g capsule Take 2 capsules (2 g) by mouth 2 times daily (Patient taking differently: Take 1 g by mouth 2  times daily Take 2 capsules (2 g) by mouth 2 times daily) 360 capsule 3     omeprazole (PRILOSEC) 20 MG DR capsule TAKE 1 CAPSULE TWICE A  capsule 3     polyethylene glycol-propylene glycol (SYSTANE) 0.4-0.3 % SOLN ophthalmic solution Place 1 drop into both eyes 4 times daily 5 mL 11     polyethylene glycol-propylene glycol (SYSTANE) 0.4-0.3 % SOLN ophthalmic solution Place 1 drop into both eyes 4 times daily (Patient taking differently: Place 1 drop into both eyes 3 times daily) 5 mL 11     pregabalin (LYRICA) 150 MG capsule Take 1 capsule (150 mg) by mouth 2 times daily 60 capsule 3     roflumilast (DALIRESP) 250 MCG TABS tablet Take 1 tablet (250 mcg) by mouth daily for 28 days, THEN 2 tablets (500 mcg) daily for 340 days. 708 tablet 0     semaglutide (OZEMPIC, 0.25 OR 0.5 MG/DOSE,) 2 MG/3ML pen Inject 0.5 mg Subcutaneous once a week 3 mL 11     STATIN NOT PRESCRIBED (INTENTIONAL) Please choose reason not prescribed from choices below.       moxifloxacin (VIGAMOX) 0.5 % ophthalmic solution Place 1 drop Into the left eye 4 times daily (Patient not taking: Reported on 7/10/2023) 3 mL 0     olopatadine (PATADAY) 0.2 % ophthalmic solution Place 0.05 mLs (1 drop) into both eyes daily (Patient not taking: Reported on 7/10/2023) 2.5 mL 11     prednisoLONE acetate (PRED FORTE) 1 % ophthalmic suspension Place 1 drop Into the left eye 4 times daily (Patient not taking: Reported on 7/10/2023) 5 mL 0     predniSONE (DELTASONE) 20 MG tablet Take 2 tablets (40 mg) by mouth daily (Patient not taking: Reported on 7/10/2023) 10 tablet 3       Past Surgical History:   Procedure Laterality Date     2/8/16                R thoracotomy, RLL lobectomy (Dr. Cunha). Adenocarcinoma, 1.1 cm, assoicated with atypical adenomatous hyperplasia Right 02/08/2016    R thoracotomy, RLL lobectomy (Dr. Cunha). Adenocarcinoma, 1.1 cm, assoicated with atypical adenomatous hyperplasia     ESOPHAGOSCOPY, GASTROSCOPY, DUODENOSCOPY (EGD),  COMBINED N/A 8/16/2022    Procedure: ESOPHAGOGASTRODUODENOSCOPY (EGD);  Surgeon: Travis Briseno MD;  Location:  GI     ORTHOPEDIC SURGERY         Past Medical History:   Diagnosis Date     Adenocarcinoma, lung (H)      Asthma      Ectopic pregnancy      Esophageal reflux      Pulmonary emphysema (H)     Very severe FEV1<30% predicted     Type II diabetes mellitus (H)        Family History   Problem Relation Age of Onset     Thyroid Disease Mother      Cerebrovascular Disease Mother      Hypertension Mother      Hypertension Father      Glaucoma Father      Cancer Sister      Lung Cancer Sister      Diabetes Brother      Cancer Brother      Diabetes Brother      Cancer Brother      Diabetes Brother      Deep Vein Thrombosis (DVT) Daughter      Depression Daughter      Alcohol/Drug Other         self     Diabetes Other         self     Thyroid Disease Other         self     Asthma Other         self     Macular Degeneration No family hx of      Anesthesia Reaction No family hx of         reports that she has quit smoking. Her smoking use included cigarettes. She smoked an average of .25 packs per day. She has never used smokeless tobacco. She reports that she does not currently use alcohol. She reports that she does not use drugs.         Physical Exam:   Wt 93 kg (205 lb)   BMI 33.10 kg/m    Wt:   Wt Readings from Last 2 Encounters:   07/18/23 93 kg (205 lb)   06/29/23 92.7 kg (204 lb 6.4 oz)      Constitutional: cooperative, pleasant, not dyspneic/diaphoretic, no acute distress  E    Wt Readings from Last 2 Encounters:   07/18/23 93 kg (205 lb)   06/29/23 92.7 kg (204 lb 6.4 oz)

## 2023-07-19 ENCOUNTER — HOSPITAL ENCOUNTER (OUTPATIENT)
Dept: CT IMAGING | Facility: CLINIC | Age: 68
Discharge: HOME | End: 2023-07-19
Attending: INTERNAL MEDICINE | Admitting: STUDENT IN AN ORGANIZED HEALTH CARE EDUCATION/TRAINING PROGRAM
Payer: COMMERCIAL

## 2023-07-19 ENCOUNTER — HOSPITAL ENCOUNTER (OUTPATIENT)
Facility: CLINIC | Age: 68
Discharge: HOME | End: 2023-07-19
Attending: STUDENT IN AN ORGANIZED HEALTH CARE EDUCATION/TRAINING PROGRAM | Admitting: STUDENT IN AN ORGANIZED HEALTH CARE EDUCATION/TRAINING PROGRAM
Payer: COMMERCIAL

## 2023-07-19 ENCOUNTER — APPOINTMENT (OUTPATIENT)
Dept: GENERAL RADIOLOGY | Facility: CLINIC | Age: 68
End: 2023-07-19
Attending: STUDENT IN AN ORGANIZED HEALTH CARE EDUCATION/TRAINING PROGRAM
Payer: COMMERCIAL

## 2023-07-19 ENCOUNTER — ANESTHESIA (OUTPATIENT)
Dept: SURGERY | Facility: CLINIC | Age: 68
End: 2023-07-19
Payer: COMMERCIAL

## 2023-07-19 ENCOUNTER — APPOINTMENT (OUTPATIENT)
Dept: GENERAL RADIOLOGY | Facility: CLINIC | Age: 68
End: 2023-07-19
Attending: INTERNAL MEDICINE
Payer: COMMERCIAL

## 2023-07-19 VITALS
DIASTOLIC BLOOD PRESSURE: 111 MMHG | HEIGHT: 66 IN | BODY MASS INDEX: 32.92 KG/M2 | TEMPERATURE: 98.1 F | WEIGHT: 204.81 LBS | SYSTOLIC BLOOD PRESSURE: 153 MMHG | RESPIRATION RATE: 18 BRPM | HEART RATE: 104 BPM | OXYGEN SATURATION: 98 %

## 2023-07-19 DIAGNOSIS — C34.31 MALIGNANT NEOPLASM OF LOWER LOBE OF RIGHT LUNG (H): ICD-10-CM

## 2023-07-19 LAB
GLUCOSE BLDC GLUCOMTR-MCNC: 165 MG/DL (ref 70–99)
GLUCOSE BLDC GLUCOMTR-MCNC: 165 MG/DL (ref 70–99)

## 2023-07-19 PROCEDURE — 88173 CYTOPATH EVAL FNA REPORT: CPT | Performed by: PATHOLOGY

## 2023-07-19 PROCEDURE — 31654 BRONCH EBUS IVNTJ PERPH LES: CPT | Mod: GC | Performed by: STUDENT IN AN ORGANIZED HEALTH CARE EDUCATION/TRAINING PROGRAM

## 2023-07-19 PROCEDURE — 87206 SMEAR FLUORESCENT/ACID STAI: CPT | Performed by: STUDENT IN AN ORGANIZED HEALTH CARE EDUCATION/TRAINING PROGRAM

## 2023-07-19 PROCEDURE — 82962 GLUCOSE BLOOD TEST: CPT

## 2023-07-19 PROCEDURE — 999N000179 XR SURGERY CARM FLUORO LESS THAN 5 MIN W STILLS: Mod: TC

## 2023-07-19 PROCEDURE — 710N000010 HC RECOVERY PHASE 1, LEVEL 2, PER MIN: Performed by: STUDENT IN AN ORGANIZED HEALTH CARE EDUCATION/TRAINING PROGRAM

## 2023-07-19 PROCEDURE — 88172 CYTP DX EVAL FNA 1ST EA SITE: CPT | Mod: 26 | Performed by: PATHOLOGY

## 2023-07-19 PROCEDURE — 88305 TISSUE EXAM BY PATHOLOGIST: CPT | Mod: 26 | Performed by: PATHOLOGY

## 2023-07-19 PROCEDURE — 360N000084 HC SURGERY LEVEL 4 W/ FLUORO, PER MIN: Performed by: STUDENT IN AN ORGANIZED HEALTH CARE EDUCATION/TRAINING PROGRAM

## 2023-07-19 PROCEDURE — 31629 BRONCHOSCOPY/NEEDLE BX EACH: CPT | Mod: GC | Performed by: STUDENT IN AN ORGANIZED HEALTH CARE EDUCATION/TRAINING PROGRAM

## 2023-07-19 PROCEDURE — 88173 CYTOPATH EVAL FNA REPORT: CPT | Mod: 26 | Performed by: PATHOLOGY

## 2023-07-19 PROCEDURE — 258N000003 HC RX IP 258 OP 636: Performed by: ANESTHESIOLOGY

## 2023-07-19 PROCEDURE — 71250 CT THORAX DX C-: CPT | Mod: 26 | Performed by: RADIOLOGY

## 2023-07-19 PROCEDURE — 87102 FUNGUS ISOLATION CULTURE: CPT | Performed by: STUDENT IN AN ORGANIZED HEALTH CARE EDUCATION/TRAINING PROGRAM

## 2023-07-19 PROCEDURE — 272N000001 HC OR GENERAL SUPPLY STERILE: Performed by: STUDENT IN AN ORGANIZED HEALTH CARE EDUCATION/TRAINING PROGRAM

## 2023-07-19 PROCEDURE — 31627 NAVIGATIONAL BRONCHOSCOPY: CPT | Mod: GC | Performed by: STUDENT IN AN ORGANIZED HEALTH CARE EDUCATION/TRAINING PROGRAM

## 2023-07-19 PROCEDURE — 31653 BRONCH EBUS SAMPLNG 3/> NODE: CPT | Mod: GC | Performed by: STUDENT IN AN ORGANIZED HEALTH CARE EDUCATION/TRAINING PROGRAM

## 2023-07-19 PROCEDURE — 250N000009 HC RX 250: Performed by: STUDENT IN AN ORGANIZED HEALTH CARE EDUCATION/TRAINING PROGRAM

## 2023-07-19 PROCEDURE — 999N000141 HC STATISTIC PRE-PROCEDURE NURSING ASSESSMENT: Performed by: STUDENT IN AN ORGANIZED HEALTH CARE EDUCATION/TRAINING PROGRAM

## 2023-07-19 PROCEDURE — 999N000065 XR CHEST PORT 1 VIEW

## 2023-07-19 PROCEDURE — 88305 TISSUE EXAM BY PATHOLOGIST: CPT | Mod: TC | Performed by: STUDENT IN AN ORGANIZED HEALTH CARE EDUCATION/TRAINING PROGRAM

## 2023-07-19 PROCEDURE — 71250 CT THORAX DX C-: CPT

## 2023-07-19 PROCEDURE — 87077 CULTURE AEROBIC IDENTIFY: CPT | Performed by: STUDENT IN AN ORGANIZED HEALTH CARE EDUCATION/TRAINING PROGRAM

## 2023-07-19 PROCEDURE — 250N000011 HC RX IP 250 OP 636: Mod: JZ | Performed by: STUDENT IN AN ORGANIZED HEALTH CARE EDUCATION/TRAINING PROGRAM

## 2023-07-19 PROCEDURE — 370N000017 HC ANESTHESIA TECHNICAL FEE, PER MIN: Performed by: STUDENT IN AN ORGANIZED HEALTH CARE EDUCATION/TRAINING PROGRAM

## 2023-07-19 PROCEDURE — 258N000003 HC RX IP 258 OP 636: Performed by: STUDENT IN AN ORGANIZED HEALTH CARE EDUCATION/TRAINING PROGRAM

## 2023-07-19 PROCEDURE — 71045 X-RAY EXAM CHEST 1 VIEW: CPT | Mod: 26 | Performed by: RADIOLOGY

## 2023-07-19 PROCEDURE — 710N000012 HC RECOVERY PHASE 2, PER MINUTE: Performed by: STUDENT IN AN ORGANIZED HEALTH CARE EDUCATION/TRAINING PROGRAM

## 2023-07-19 RX ORDER — ONDANSETRON 4 MG/1
4 TABLET, ORALLY DISINTEGRATING ORAL EVERY 30 MIN PRN
Status: CANCELLED | OUTPATIENT
Start: 2023-07-19

## 2023-07-19 RX ORDER — HYDROMORPHONE HCL IN WATER/PF 6 MG/30 ML
0.4 PATIENT CONTROLLED ANALGESIA SYRINGE INTRAVENOUS EVERY 5 MIN PRN
Status: DISCONTINUED | OUTPATIENT
Start: 2023-07-19 | End: 2023-07-19 | Stop reason: HOSPADM

## 2023-07-19 RX ORDER — PROPOFOL 10 MG/ML
INJECTION, EMULSION INTRAVENOUS PRN
Status: DISCONTINUED | OUTPATIENT
Start: 2023-07-19 | End: 2023-07-19

## 2023-07-19 RX ORDER — FENTANYL CITRATE 50 UG/ML
50 INJECTION, SOLUTION INTRAMUSCULAR; INTRAVENOUS EVERY 5 MIN PRN
Status: DISCONTINUED | OUTPATIENT
Start: 2023-07-19 | End: 2023-07-19 | Stop reason: HOSPADM

## 2023-07-19 RX ORDER — OXYCODONE HYDROCHLORIDE 10 MG/1
10 TABLET ORAL
Status: CANCELLED | OUTPATIENT
Start: 2023-07-19

## 2023-07-19 RX ORDER — LIDOCAINE HYDROCHLORIDE 20 MG/ML
INJECTION, SOLUTION INFILTRATION; PERINEURAL PRN
Status: DISCONTINUED | OUTPATIENT
Start: 2023-07-19 | End: 2023-07-19

## 2023-07-19 RX ORDER — DEXAMETHASONE SODIUM PHOSPHATE 4 MG/ML
INJECTION, SOLUTION INTRA-ARTICULAR; INTRALESIONAL; INTRAMUSCULAR; INTRAVENOUS; SOFT TISSUE PRN
Status: DISCONTINUED | OUTPATIENT
Start: 2023-07-19 | End: 2023-07-19

## 2023-07-19 RX ORDER — ONDANSETRON 2 MG/ML
4 INJECTION INTRAMUSCULAR; INTRAVENOUS EVERY 30 MIN PRN
Status: CANCELLED | OUTPATIENT
Start: 2023-07-19

## 2023-07-19 RX ORDER — ONDANSETRON 4 MG/1
4 TABLET, ORALLY DISINTEGRATING ORAL EVERY 30 MIN PRN
Status: DISCONTINUED | OUTPATIENT
Start: 2023-07-19 | End: 2023-07-19 | Stop reason: HOSPADM

## 2023-07-19 RX ORDER — SODIUM CHLORIDE, SODIUM LACTATE, POTASSIUM CHLORIDE, CALCIUM CHLORIDE 600; 310; 30; 20 MG/100ML; MG/100ML; MG/100ML; MG/100ML
INJECTION, SOLUTION INTRAVENOUS CONTINUOUS
Status: DISCONTINUED | OUTPATIENT
Start: 2023-07-19 | End: 2023-07-19 | Stop reason: HOSPADM

## 2023-07-19 RX ORDER — HYDROMORPHONE HCL IN WATER/PF 6 MG/30 ML
0.2 PATIENT CONTROLLED ANALGESIA SYRINGE INTRAVENOUS EVERY 5 MIN PRN
Status: DISCONTINUED | OUTPATIENT
Start: 2023-07-19 | End: 2023-07-19 | Stop reason: HOSPADM

## 2023-07-19 RX ORDER — FENTANYL CITRATE 50 UG/ML
25 INJECTION, SOLUTION INTRAMUSCULAR; INTRAVENOUS EVERY 5 MIN PRN
Status: DISCONTINUED | OUTPATIENT
Start: 2023-07-19 | End: 2023-07-19 | Stop reason: HOSPADM

## 2023-07-19 RX ORDER — ONDANSETRON 2 MG/ML
INJECTION INTRAMUSCULAR; INTRAVENOUS PRN
Status: DISCONTINUED | OUTPATIENT
Start: 2023-07-19 | End: 2023-07-19

## 2023-07-19 RX ORDER — ESMOLOL HYDROCHLORIDE 10 MG/ML
INJECTION INTRAVENOUS PRN
Status: DISCONTINUED | OUTPATIENT
Start: 2023-07-19 | End: 2023-07-19

## 2023-07-19 RX ORDER — ONDANSETRON 2 MG/ML
4 INJECTION INTRAMUSCULAR; INTRAVENOUS EVERY 30 MIN PRN
Status: DISCONTINUED | OUTPATIENT
Start: 2023-07-19 | End: 2023-07-19 | Stop reason: HOSPADM

## 2023-07-19 RX ORDER — LIDOCAINE 40 MG/G
CREAM TOPICAL
Status: DISCONTINUED | OUTPATIENT
Start: 2023-07-19 | End: 2023-07-19 | Stop reason: HOSPADM

## 2023-07-19 RX ORDER — OXYCODONE HYDROCHLORIDE 5 MG/1
5 TABLET ORAL
Status: CANCELLED | OUTPATIENT
Start: 2023-07-19

## 2023-07-19 RX ADMIN — Medication 10 MG: at 12:27

## 2023-07-19 RX ADMIN — SUGAMMADEX 200 MG: 100 INJECTION, SOLUTION INTRAVENOUS at 13:19

## 2023-07-19 RX ADMIN — PHENYLEPHRINE HYDROCHLORIDE 100 MCG: 10 INJECTION INTRAVENOUS at 11:57

## 2023-07-19 RX ADMIN — Medication 50 MG: at 11:28

## 2023-07-19 RX ADMIN — LIDOCAINE HYDROCHLORIDE 80 MG: 20 INJECTION, SOLUTION INFILTRATION; PERINEURAL at 11:28

## 2023-07-19 RX ADMIN — PROPOFOL 150 MCG/KG/MIN: 10 INJECTION, EMULSION INTRAVENOUS at 11:44

## 2023-07-19 RX ADMIN — PROPOFOL 170 MG: 10 INJECTION, EMULSION INTRAVENOUS at 11:28

## 2023-07-19 RX ADMIN — PROPOFOL 30 MG: 10 INJECTION, EMULSION INTRAVENOUS at 12:25

## 2023-07-19 RX ADMIN — DEXAMETHASONE SODIUM PHOSPHATE 8 MG: 4 INJECTION, SOLUTION INTRA-ARTICULAR; INTRALESIONAL; INTRAMUSCULAR; INTRAVENOUS; SOFT TISSUE at 11:28

## 2023-07-19 RX ADMIN — ONDANSETRON 4 MG: 2 INJECTION INTRAMUSCULAR; INTRAVENOUS at 13:00

## 2023-07-19 RX ADMIN — Medication 5 MG: at 12:53

## 2023-07-19 RX ADMIN — SODIUM CHLORIDE, POTASSIUM CHLORIDE, SODIUM LACTATE AND CALCIUM CHLORIDE: 600; 310; 30; 20 INJECTION, SOLUTION INTRAVENOUS at 11:22

## 2023-07-19 RX ADMIN — Medication 5 MG: at 13:06

## 2023-07-19 RX ADMIN — PROPOFOL 200 MCG/KG/MIN: 10 INJECTION, EMULSION INTRAVENOUS at 11:30

## 2023-07-19 RX ADMIN — ESMOLOL HYDROCHLORIDE 20 MG: 10 INJECTION, SOLUTION INTRAVENOUS at 11:34

## 2023-07-19 RX ADMIN — PHENYLEPHRINE HYDROCHLORIDE 200 MCG: 10 INJECTION INTRAVENOUS at 12:12

## 2023-07-19 ASSESSMENT — COPD QUESTIONNAIRES
CAT_SEVERITY: SEVERE
COPD: 1

## 2023-07-19 ASSESSMENT — ENCOUNTER SYMPTOMS: SEIZURES: 0

## 2023-07-19 ASSESSMENT — ACTIVITIES OF DAILY LIVING (ADL)
ADLS_ACUITY_SCORE: 36
ADLS_ACUITY_SCORE: 33

## 2023-07-19 ASSESSMENT — LIFESTYLE VARIABLES: TOBACCO_USE: 1

## 2023-07-19 NOTE — ANESTHESIA CARE TRANSFER NOTE
Patient: Jenn Aparicio    Procedure: Procedure(s):  robot assisted Ion BRONCHOSCOPY, WITH BIOPSY  Endobronchial ultrasound flexible       Diagnosis: Lung nodule [R91.1]  Diagnosis Additional Information: No value filed.    Anesthesia Type:   General     Note:    Oropharynx: oropharynx clear of all foreign objects and spontaneously breathing  Level of Consciousness: awake  Oxygen Supplementation: face mask  Level of Supplemental Oxygen (L/min / FiO2): 10  Independent Airway: airway patency satisfactory and stable  Dentition: dentition unchanged  Vital Signs Stable: post-procedure vital signs reviewed and stable  Report to RN Given: handoff report given  Patient transferred to: PACU    Handoff Report: Identifed the Patient, Identified the Reponsible Provider, Reviewed the pertinent medical history, Discussed the surgical course, Reviewed Intra-OP anesthesia mangement and issues during anesthesia, Set expectations for post-procedure period and Allowed opportunity for questions and acknowledgement of understanding      Vitals:  Vitals Value Taken Time   /70 07/19/23 1337   Temp     Pulse 93 07/19/23 1343   Resp     SpO2 98 % 07/19/23 1343   Vitals shown include unvalidated device data.    Electronically Signed By: Michael Negron MD  July 19, 2023  1:44 PM

## 2023-07-19 NOTE — PROCEDURES
INTERVENTIONAL PULMONOLOGY       Procedure(s):    A flexible bronchoscopy  Airway exam  EBUS-TBNA (5 sites)  Radial EBUS Navigation (1 site)  Therapeutic suctioning (1 site)  Transbronchial fine needle aspiration (1 site)  ION Robotic Bronchoscopy     Indication:  RUL lesion    Attending of Record:  Patria Garcia MD     Interventional Pulmonary Fellow   Parish Mancia    Trainees Present:   None     Medications:    General Anesthesia - See anesthesia flowsheet for details    Sedation Time:   Per Anesthesia Care Provider    Time Out:  Performed    The patient's medical record has been reviewed.  The indication for the procedure was reviewed.  The necessary history and physical examination was performed and reviewed.  The risks, benefits and alternatives of the procedure were discussed with the the patient in detail and she had the opportunity to ask questions.  I discussed in particular the potential complications including risks of minor or life-threatening bleeding and/or infection, respiratory failure, vocal cord trauma / paralysis, pneumothorax, and discomfort. Sedation risks were also discussed including abnormal heart rhythms, low blood pressure, and respiratory failure. All questions were answered to the best of my ability.  Verbal and written informed consent was obtained.  The proposed procedure and the patient's identification were verified prior to the procedure by the physician and the nurse.    The patient was assessed for the adequacy for the procedure and to receive medications.   Mental Status:  Alert and oriented x 3  Airway examination:  Class I (Complete visualization of soft palate)  Pulmonary:  Non labored respirations  CV:  Regular rate  ASA Grade:  (II)  Mild systemic disease    After clinical evaluation and reviewing the indication, risks, alternatives and benefits of the procedure the patient was deemed to be in satisfactory condition to undergo the procedure.      Immediately before  administration of medications the patient was re-assessed for adequacy to receive sedatives including the heart rate, respiratory rate, mental status, oxygen saturation, blood pressure and adequacy of pulmonary ventilation. These same parameters were continuously monitored throughout the procedure.    A Tuberculosis risk assessment was performed:  The patient has no known RISK of Tuberculosis    The procedure was performed in a negative airflow room: Yes    Maneuvers / Procedure:      A Flexible  bronchoscope was used for the procedure. The patient was orally intubated with a # 8.5 ETT and the flexible scope was passed through.    Airway Examination: A complete airway examination was performed from the distal trachea to the subsegmental level in each lobe of both lungs.  Pertinent findings include secretions inside the airways, no endobronchial lesions.       EBUS-TBNA (LN): The EBUS scope was inserted and evaluation included: 11L, 4L, 7, 4R, 11R. Using EBUS 21 G needle TBNA was performed in LN statios 11L, 4L, 7, 4R and 11R  ION Robotic Bronchoscopy: Planning was performed on the laptop prior to the procedure. The ION successfully completed its pre-procedural check phase. The ION arm was oriented towards the ETT and docked successfully. The robotic catheter was inserted into the ETT and the guide was clamped down. Registration was then completed successfully. The catheter was advanced towards the RUL Posterior lesion/nodule using the vision probe. Local confirmation of the lesion utilized with ENB-Targeting and Radial EBUS.. The lesion was biopsied using 21G FNA Needle. A total of 8 passes were performed. TAVARES was present throughout the procedure. EBL was minimal. Guide catheter and vision probe was removed successfully. The ION platform was undocked without issues.   Transbronchial Fine Needle Aspiration: RUL lesion.     Any disposable equipment was visually inspected and deemed to be intact immediately post  procedure.      Relevant Pictures    RUL lesion rEBUS view        Assessment and Recommendations:     1. Successful completion of ION robotic bronchoscopy with FNA, EBUS TBNA stations 11L, 4L, 7, 4R and 11R  2. Ok to discharge once medically stable.   3. Follow up with IP service          Parish Mancia  Interventional pulmonary fellow  Pager: [phone]

## 2023-07-19 NOTE — ANESTHESIA PREPROCEDURE EVALUATION
Anesthesia Pre-Procedure Evaluation    Patient: Jenn Aparicio   MRN: [id number] : 1955        Procedure : Procedure(s):  robot assisted Ion BRONCHOSCOPY, WITH BIOPSY  Endobronchial ultrasound flexible          Past Medical History:   Diagnosis Date     Adenocarcinoma, lung (H)      Asthma      Ectopic pregnancy      Esophageal reflux      Pulmonary emphysema (H)     Very severe FEV1<30% predicted     Type II diabetes mellitus (H)       Past Surgical History:   Procedure Laterality Date     16                R thoracotomy, RLL lobectomy (Dr. Cunha). Adenocarcinoma, 1.1 cm, assoicated with atypical adenomatous hyperplasia Right 2016    R thoracotomy, RLL lobectomy (Dr. Cunha). Adenocarcinoma, 1.1 cm, assoicated with atypical adenomatous hyperplasia     ESOPHAGOSCOPY, GASTROSCOPY, DUODENOSCOPY (EGD), COMBINED N/A 2022    Procedure: ESOPHAGOGASTRODUODENOSCOPY (EGD);  Surgeon: Travis Briseno MD;  Location:  GI     ORTHOPEDIC SURGERY        Allergies   Allergen Reactions     Interferons Dermatitis     Penicillins Hives     Aspirin      325mg      Colon Care       Social History     Tobacco Use     Smoking status: Former     Packs/day: 0.25     Types: Cigarettes     Smokeless tobacco: Never     Tobacco comments:     2023 Patient using 14 mg patch, wants to have prescription for Nicotrol inhaler, took workbook   Substance Use Topics     Alcohol use: Not Currently      Wt Readings from Last 1 Encounters:   23 92.9 kg (204 lb 12.9 oz)        Anesthesia Evaluation   Pt has had prior anesthetic.     No history of anesthetic complications       ROS/MED HX  ENT/Pulmonary:     (+) sleep apnea (not using CPAP, but has one), doesn't use CPAP, tobacco use, Past use, severe,  COPD, O2 dependent,     Neurologic:  - neg neurologic ROS  (-) no seizures and no CVA   Cardiovascular:     (+) hypertension-----Taking blood thinners Previous cardiac testing   Echo: Date: 2022 Results:  Interpretation  Summary  Left ventricular size, wall motion and function are normal. The ejection  fraction is 55-60%.     Right ventricular function, chamber size, wall motion, and thickness are  normal.     No pericardial effusion is present.     The inferior vena cava is normal.     No significant valvular abnormalities present.     There is no prior study for direct comparison.  Stress Test: Date: Results:    ECG Reviewed: Date: 2/20/23 Results:  Sinus tachycardia, septal infarct  Cath: Date: Results:      METS/Exercise Tolerance: 1 - Eating, dressing Comment: Limited activity. Can walk with walker but limited due to RUSSELL for past 2 years.    Hematologic:     (+) history of blood transfusion, no previous transfusion reaction,  (-) history of blood clots   Musculoskeletal:  - neg musculoskeletal ROS     GI/Hepatic: Comment: Esophageal dysmotility     (+) GERD, hepatitis resolved hepatitis type C,     Renal/Genitourinary:  - neg Renal ROS     Endo: Comment: Prednisone on hand, but no recent use    (+) type II DM, Last HgA1c: 10.5, date: 4/2023, Not using insulin, Normal glucose range: 50 at night, 150-200,     Psychiatric/Substance Use:  - neg psychiatric ROS     Infectious Disease:  - neg infectious disease ROS     Malignancy:   (+) Malignancy, History of Lung.  Lung CA status post Radiation and Surgery.        Other:            Physical Exam    Airway  airway exam normal           Respiratory Devices and Support         Dental       (+) Multiple visibly decayed, broken teeth      Cardiovascular   cardiovascular exam normal          Pulmonary           (+) rhonchi, decreased breath sounds and wheezes           OUTSIDE LABS:  CBC:   Lab Results   Component Value Date    WBC 6.1 05/25/2023    WBC 8.5 02/24/2023    HGB 13.8 05/25/2023    HGB 13.0 02/24/2023    HCT 45.0 05/25/2023    HCT 43.4 02/24/2023     05/25/2023     02/24/2023     BMP:   Lab Results   Component Value Date     05/25/2023     04/17/2023     POTASSIUM 4.0 05/25/2023    POTASSIUM 4.9 04/17/2023    CHLORIDE 105 05/25/2023    CHLORIDE 105 04/17/2023    CO2 32 (H) 05/25/2023    CO2 36 (H) 04/17/2023    BUN 8.1 05/25/2023    BUN 7.3 (L) 04/17/2023    CR 0.5 05/25/2023    CR 0.55 05/25/2023     (H) 07/19/2023     (H) 05/25/2023     COAGS:   Lab Results   Component Value Date    PTT 30 04/30/2008    INR 1.02 02/03/2023     POC:   Lab Results   Component Value Date     (H) 06/25/2021    HCG Negative 04/20/2011     HEPATIC:   Lab Results   Component Value Date    ALBUMIN 4.0 05/25/2023    PROTTOTAL 7.0 05/25/2023    ALT 9 (L) 05/25/2023    AST 14 05/25/2023    ALKPHOS 89 05/25/2023    BILITOTAL 0.3 05/25/2023    LACEY 15 02/07/2008     OTHER:   Lab Results   Component Value Date    PH 7.36 02/23/2023    LACT 0.9 02/20/2023    LACT 0.8 02/20/2023    A1C 10.5 (H) 04/17/2023    LUIGI 9.1 05/25/2023    PHOS 2.9 05/20/2022    MAG 2.1 05/22/2022    TSH 2.65 08/31/2022    CRP <2.9 05/22/2022       Anesthesia Plan    ASA Status:  3   NPO Status:  NPO Appropriate    Anesthesia Type: General.     - Airway: ETT   Induction: Intravenous.   Maintenance: TIVA.   Techniques and Equipment:     - Airway: Video-Laryngoscope         Consents    Anesthesia Plan(s) and associated risks, benefits, and realistic alternatives discussed. Questions answered and patient/representative(s) expressed understanding.     - Discussed: Risks, Benefits and Alternatives for BOTH SEDATION and the PROCEDURE were discussed     - Discussed with:  Patient         Postoperative Care    Pain management: IV analgesics.   PONV prophylaxis: Background Propofol Infusion, Ondansetron (or other 5HT-3), Dexamethasone or Solumedrol     Comments:                Michael Negron MD

## 2023-07-19 NOTE — ANESTHESIA POSTPROCEDURE EVALUATION
Patient: Jenn Aparicio    Procedure: Procedure(s):  robot assisted Ion BRONCHOSCOPY, WITH BIOPSY  Endobronchial ultrasound flexible       Anesthesia Type:  General    Note:  Disposition: Outpatient   Postop Pain Control: Uneventful            Sign Out: Well controlled pain   PONV: No   Neuro/Psych: Uneventful            Sign Out: Acceptable/Baseline neuro status   Airway/Respiratory: Uneventful            Sign Out: Acceptable/Baseline resp. status   CV/Hemodynamics: Uneventful            Sign Out: Acceptable CV status; No obvious hypovolemia; No obvious fluid overload   Other NRE: NONE   DID A NON-ROUTINE EVENT OCCUR? No           Last vitals:  Vitals Value Taken Time   /65 07/19/23 1400   Temp 37  C (98.6  F) 07/19/23 1340   Pulse 91 07/19/23 1406   Resp 17 07/19/23 1400   SpO2 93 % 07/19/23 1406   Vitals shown include unvalidated device data.    Electronically Signed By: Kenneth Mcnair MD, MD  July 19, 2023  2:07 PM

## 2023-07-19 NOTE — DISCHARGE INSTRUCTIONS
Post Bronchoscopy Patient Instructions:    July 19, 2023  Jenn Aparicio    Your procedure completed (bronchoscopy with right upper lung biopsy, lymph node biopsy) without any immediate complications.  You may cough up scant amount of blood for the next 12-24 hours. If you have excessive cough with blood, chest pain, shortness of breath, please report to the closest emergency room.    You may experience low grade (less than 100.5 F) fever next 24 hours, if so, you can take Tylenol. If the fever persists more than 24 hours, please contact to our office or your primary care provider.    Our office (Thoracic/Pulmonary--[phone]) will call you with the results of samples taken during the procedure. Please note that you may get a result notification through  My Chart  before us calling you, as the Laboratories are instructed to release the results as soon as they are available to the patients and providers at the same time. Please allow your us 24-48 hours call you to discuss the results.    You may resume your diet as it was prior to procedure.    You may resume your medications after the procedure unless you are instructed to do differently.     Please follow instructions from the nursing staff upon discharge in terms of activity. In general, you should avoid any attention or motor skill requiring activities (e.g., driving or operating any motorized vehicle) for 24 hours as you might be still under the effect of sedation medications. Please make sure an adult to accompany you next 24 hours.     Should you have any question, please do not hesitate to call our office.    Parish Mancia MD     [facility], [place]  Same-Day Surgery   Adult Discharge Orders & Instructions     For 24 hours after surgery    Get plenty of rest.  A responsible adult must stay with you for at least 24 hours after you leave the hospital.   Do not drive or use heavy equipment.  If you have weakness or tingling,  don't drive or use heavy equipment until this feeling goes away.  Do not drink alcohol.  Avoid strenuous or risky activities.  Ask for help when climbing stairs.   You may feel lightheaded.  IF so, sit for a few minutes before standing.  Have someone help you get up.   If you have nausea (feel sick to your stomach): Drink only clear liquids such as apple juice, ginger ale, broth or 7-Up.  Rest may also help.  Be sure to drink enough fluids.  Move to a regular diet as you feel able.  You may have a slight fever. Call the doctor if your fever is over 100 F (37.7 C) (taken under the tongue) or lasts longer than 24 hours.  You may have a dry mouth, a sore throat, muscle aches or trouble sleeping.  These should go away after 24 hours.  Do not make important or legal decisions.   Call your doctor for any of the followin.  Signs of infection (fever, growing tenderness at the surgery site, a large amount of drainage or bleeding, severe pain, foul-smelling drainage, redness, swelling).    2. It has been over 8 to 10 hours since surgery and you are still not able to urinate (pass water).    3.  Headache for over 24 hours.    4.  Numbness, tingling or weakness the day after surgery (if you had spinal anesthesia).  To contact a doctor, call  ' [phone] and ask for the resident on call for   Pulmonology (answered 24 hours a day)  '   Emergency Department:    [facility]: [phone]       (TTY for hearing impaired: [phone])    [facility]: [phone]       (TTY for hearing impaired: [phone])

## 2023-07-19 NOTE — ANESTHESIA PROCEDURE NOTES
Airway       Patient location during procedure: OR       Procedure Start/Stop Times: 7/19/2023 11:31 AM  Staff -        Anesthesiologist:  Kenneth Mcnair MD       Resident/Fellow: Michael Negron MD       Performed By: resident  Consent for Airway        Urgency: elective  Indications and Patient Condition       Indications for airway management: yadi-procedural       Induction type:intravenous       Mask difficulty assessment: 1 - vent by mask    Final Airway Details       Final airway type: endotracheal airway       Successful airway: ETT - single  Endotracheal Airway Details        ETT size (mm): 8.5 (for bronchoscopy)       Cuffed: yes       Cuff volume (mL): 10       Successful intubation technique: video laryngoscopy       VL Blade Size: MAC 3       Grade View of Cords: 2       Adjucts: stylet       Position: Right       Measured from: lips       Secured at (cm): 22    Post intubation assessment        Placement verified by: capnometry        Number of attempts at approach: 1       Number of other approaches attempted: 0       Secured with: cloth tape       Ease of procedure: easy       Dentition: Intact    Medication(s) Administered   Medication Administration Time: 7/19/2023 11:31 AM

## 2023-07-20 LAB
PATH REPORT.COMMENTS IMP SPEC: NORMAL
PATH REPORT.FINAL DX SPEC: NORMAL
PATH REPORT.GROSS SPEC: NORMAL
PATH REPORT.MICROSCOPIC SPEC OTHER STN: NORMAL
PATH REPORT.RELEVANT HX SPEC: NORMAL

## 2023-07-21 RX ORDER — AMLODIPINE BESYLATE 10 MG/1
10 TABLET ORAL DAILY
Qty: 90 TABLET | Refills: 3 | Status: SHIPPED | OUTPATIENT
Start: 2023-07-21 | End: 2024-03-07

## 2023-07-22 LAB — BACTERIA BRONCH: ABNORMAL

## 2023-07-24 ENCOUNTER — DOCUMENTATION ONLY (OUTPATIENT)
Dept: INTERNAL MEDICINE | Facility: CLINIC | Age: 68
End: 2023-07-24
Payer: COMMERCIAL

## 2023-07-24 NOTE — PROGRESS NOTES
Type of Form Received: Verbal Order    Form Received (Date) 7/24/23   Form Filled out Yes, 7/27/23   Placed in provider folder Yes

## 2023-07-26 ENCOUNTER — TELEPHONE (OUTPATIENT)
Dept: GASTROENTEROLOGY | Facility: CLINIC | Age: 68
End: 2023-07-26
Payer: COMMERCIAL

## 2023-07-27 ENCOUNTER — MEDICAL CORRESPONDENCE (OUTPATIENT)
Dept: INTERNAL MEDICINE | Facility: CLINIC | Age: 68
End: 2023-07-27

## 2023-07-27 ENCOUNTER — VIRTUAL VISIT (OUTPATIENT)
Dept: ONCOLOGY | Facility: CLINIC | Age: 68
End: 2023-07-27
Attending: INTERNAL MEDICINE
Payer: COMMERCIAL

## 2023-07-27 VITALS — BODY MASS INDEX: 32.14 KG/M2 | HEIGHT: 66 IN | WEIGHT: 200 LBS

## 2023-07-27 DIAGNOSIS — A49.1 STREPTOCOCCUS PNEUMONIAE: ICD-10-CM

## 2023-07-27 DIAGNOSIS — C34.31 MALIGNANT NEOPLASM OF LOWER LOBE OF RIGHT LUNG (H): Primary | ICD-10-CM

## 2023-07-27 PROCEDURE — 99213 OFFICE O/P EST LOW 20 MIN: CPT | Mod: 95 | Performed by: INTERNAL MEDICINE

## 2023-07-27 RX ORDER — LEVOFLOXACIN 500 MG/1
500 TABLET, FILM COATED ORAL DAILY
Qty: 7 TABLET | Refills: 0 | Status: SHIPPED | OUTPATIENT
Start: 2023-07-27 | End: 2023-08-09

## 2023-07-27 ASSESSMENT — PAIN SCALES - GENERAL: PAINLEVEL: WORST PAIN (10)

## 2023-07-27 NOTE — NURSING NOTE
Is the patient currently in the state of [place]? YES    Visit mode:VIDEO    If the visit is dropped, the patient can be reconnected by: VIDEO VISIT: Send to e-mail at: [email or web address]    Will anyone else be joining the visit? NO    How would you like to obtain your AVS? MyChart    Are changes needed to the allergy or medication list? YES: states insulin changed    Reason for visit: SHELLI Mello, VF/LPN

## 2023-07-27 NOTE — LETTER
7/27/2023         RE: Jenn Aparicio  [address]  [place] [address]        Dear Colleague,    Thank you for referring your patient, Jenn Aparicio, to the [facility]. Please see a copy of my visit note below.         [facility]    PATIENT NAME: Jenn Aparicio  MRN # [id number]   DATE OF VISIT: July 27, 2023  YOB: 1955     CANCER TYPE: Lung adenocarcinoma  STAGE: IA2 [id number] poorly diff adeno RLL, then [id number] IA2 suspected NSCLC RUL, medically inoperable     ECOG PS: 2    PD-L1: N/A  Lung panel: N/A  NGS: N/A    SUMMARY  12/24/15 PET/CT  1/13/15 CT lung bx. Adenocarcinoma  2/8/16 R thoracotomy, RLL lobectomy (Dr. Cunha). Adenocarcinoma, 1.1 cm, assoicated with atypical adenomatous hyperplasia  10/18/19 CTA for shortness of breath. New 1.3 cm RUL nodule  11/2019 PET/CT. 1.1 cm RUL hypermetabolic nodule (SUV 12.6)  12/26/19 Brain MRI negative for mets  1/14~1/28/20 SBRT to RUL nodule, 5000 cGy, every other day, 1000 [place] [facility] [place] bx due to O2 dependence and too much risk  8/19/20 First visit with me   11/29/21 CT chest. New 10 mm RUL nodule  1/11/22 CT chest. New 7.2 mm RUL nodule, unchanged RUL nodules  3/31/22 CT chest. New RUL nodule from Jan 2022 7-->10 mm, new 5 mm ROBERT nodule  5/20~5/24/22 [facility] for COPD exacerbation.   6/24/22 CT chest non contrast.   8/16/22 EGD. 3 cm hiatal hernia, o/w normal  8/31/22 CT chest. 2.5 x 1.3 cm R midlung subpleural nodularity (4:98) previously 2.3 x 1.1 cm, slightly increased solid component   10/5/22 PET/CT. 2 x 1.3 cm irregular opacity posterior RUL (SUV 2.46), 2.4 x 0.8 cm linear opacity (SUV 4.76); there was bronchiectasia on prior imaging. Stable 1.1 x 0.8 cm non FDG avid RUL opacity and unchanged 4 mm posterior RUL nodule. No adenopathy. Inferior right breast uptake (SUV 6.09) likely infectious or inflammatory.   2/3~2/9/23 [facility] for COPD exacerbation  2/4/23 CTA during hospitalization. No  significant change in 2.3 x 1.5 cm spiculated nodule in the right upper lobe (series 7 image 94) with surrounding linear parenchymal opacities and subtle nodularity  2/20~2/24/23 [facility] for COPD exacerbation.   3/1/23 CT chest non contrast. 2.3 cm posterior RUL irregular nodule unchanged from 12/1/22 however slightly increased soft tissue component compared to 10/5/22 PET. Stable ROBERT mucous plugging with micronodularity.  3/22/23 CT chest. Stable compared to 3/1/23  5/25/23 CT chest abd w/contrast. Unchanged 1.3 x 0.5 cm R apical nodule, unchnaged 2.4 x 1.3 cm R upper nodular consolidation. New nodular/masslike area of consolidation anteriorly and laterally measuring 3.3 x 1.4 cm, not present on CT 3/22/23. No adenopathy. Consider PET or close surveillance CT in 3 months  7/19/23 Bronch, EBUS (Dr. Garcia). 11L negative, scant cells. 4L unsatisfactory for eval. 8 negative, scant cellularity, 4R unsatisfactory, 11R negative, scan cellularity, RUL negative, showed acute inflammation, limited by scant cellularity.      ASSESSMENT AND PLAN  NSCLC, adeno, RUL: PET worrisome for recurrent disease, then had bx 7/19. Path - mostly inadequate sampling but messaged with Dr. Garcia - was really in the RUL consolidated area, could have missed something more peripheral, etc. I discussed these nuances with Jenn, but overall, I think we can be satisfied for the moment that we tried as hard as we could to prove or disprove cancer, and for now, will continue surveillance. CT chest/abd in 3 months     Strep: From FNA 7/19. Discussed with Dr. Garcia, prob worth treating, levofloxacin 500 mg daily x 7 days. Potential side effects reviewed. She was vaccinated back in Jan 2022.     COPD: Some recent exacerbations earlier this year, more stable over the warmer months. Not actively discussed today. Remains O2 dependent.    Dysphagia: Met with Dr. Briseno in GI 7/18/23, has had pretty extensive evaluation before. Next step is video swallow, possibly  EGD if video swallow negative. No abnormalities in the head/neck are on PET 6/30/23, which is reassuring from a cancer perspective.     DM2: Seeing Dr. Shabazz.    Peripheral neuropathy: Not discussed today. Mostly driven by DM    25 minutes spent by me on the date of the encounter doing chart review, history and exam, documentation and further activities per the note     Zarina Leung MD  Associate Professor of Medicine  Hematology, Oncology and Transplantation      SUBJECTIVE  Jenn returns to review bx results.   Doing ok. Busy with appointments. No other major changes since our last visit     PAST MEDICAL HISTORY  Lung adenocarcinoma  DM2 with neuropathy  HCV IA, undetectable in 2019, treated  COPD. O2 dependent since late 2018. PFT 11/26/19. FEV1 0.64 (30%), FVC 1.69 (63%), DLCOunc 9.8 (38%).  HTN  H/o ITP  H/o disk herniation  Ankle surgery  Median neuropathy R  H/o sessile colon polyps 2014, h/o adenomas before that. [place]oscopy 2[place]18 [place] [facility] [place] Sessile serated adenoma x 1, tubular adenoma x 3  H/o chronic LBP  Dyslipidemia  Cataractsnote    CURRENT OUTPATIENT MEDICATIONS  Current Outpatient Medications   Medication Sig Dispense Refill    acetylcysteine (MUCOMYST) 10 % nebulizer solution Inhale 4 mLs into the lungs 4 times daily (Patient taking differently: Inhale 4 mLs into the lungs as needed) 480 mL 0    albuterol (PROAIR HFA/PROVENTIL HFA/VENTOLIN HFA) 108 (90 Base) MCG/ACT inhaler USE 1 OR 2 INHALATIONS EVERY 4 HOURS AS NEEDED (Patient taking differently: Inhale 1 puff into the lungs daily USE 1 OR 2 INHALATIONS EVERY 4 HOURS AS NEEDED) 17 g 3    albuterol (PROVENTIL) (2.5 MG/3ML) 0.083% neb solution Take 1 vial (2.5 mg) by nebulization 4 times daily 360 mL 0    Alpha Lipoic Acid 200 MG CAPS Take 1 capsule by mouth daily (Patient taking differently: Take 1 capsule by mouth every morning) 90 capsule 3    amLODIPine (NORVASC) 10 MG tablet Take 1 tablet (10 mg) by mouth daily 90 tablet 3    ASPIRIN LOW DOSE  81 MG EC tablet TAKE 1 TABLET DAILY (Patient taking differently: Take 81 mg by mouth every morning) 90 tablet 2    buPROPion (WELLBUTRIN XL) 300 MG 24 hr tablet Take 1 tablet (300 mg) by mouth every morning 90 tablet 3    cetirizine (ZYRTEC) 10 MG tablet TAKE 1 TABLET DAILY (Patient taking differently: Take 10 mg by mouth every morning) 30 tablet 9    Continuous Blood Gluc Sensor (FREESTYLE GIOVANNI 2 SENSOR) MISC 1 each every 14 days For use with Freestyle Giovanni 2  for continuous monitioring of blood glucose levels. Replace sensor every 14 days. 2 each 11    cyanocobalamin (VITAMIN B-12) 500 MCG tablet Take 1 tablet (500 mcg) by mouth daily (Patient taking differently: Take 500 mcg by mouth every morning) 90 tablet 1    doxycycline hyclate (VIBRA-TABS) 100 MG tablet Take 1 tablet (100 mg) by mouth 2 times daily (Patient taking differently: Take 100 mg by mouth every morning) 10 tablet 1    empagliflozin (JARDIANCE) 25 MG TABS tablet Take 1 tablet (25 mg) by mouth daily (Patient taking differently: Take 25 mg by mouth every morning) 90 tablet 1    fluticasone (FLONASE) 50 MCG/ACT nasal spray Spray 1 spray into both nostrils daily Use at night before bed (Patient taking differently: Spray 1 spray into both nostrils 2 times daily Use at night before bed) 15.8 mL 7    Fluticasone-Umeclidin-Vilanterol (TRELEGY ELLIPTA) 200-62.5-25 MCG/INH oral inhaler Inhale 1 puff into the lungs daily (Patient taking differently: Inhale 1 puff into the lungs every morning) 28 each 5    GLUCOSAMINE-CHONDROITIN -400 MG tablet TAKE 1 TABLET DAILY (Patient taking differently: Take 1 tablet by mouth every evening) 90 tablet 3    insulin pen needle (BD MARCIO U/F) 32G X 4 MM miscellaneous Use 1 pen needle daily or as directed. 100 each 1    ipratropium - albuterol 0.5 mg/2.5 mg/3 mL (DUONEB) 0.5-2.5 (3) MG/3ML neb solution Take 1 vial by nebulization every 6 hours as needed for shortness of breath, wheezing or cough      ketorolac  (ACULAR) 0.5 % ophthalmic solution Place 1 drop Into the left eye 4 times daily (Patient taking differently: Place 1 drop Into the left eye 2 times daily) 5 mL 0    ketotifen (ZADITOR) 0.025 % ophthalmic solution Place 1 drop into both eyes 2 times daily      moxifloxacin (VIGAMOX) 0.5 % ophthalmic solution Place 1 drop Into the left eye 4 times daily 3 mL 0    multivitamin w/minerals (THERA-VIT-M) tablet Take 1 tablet by mouth daily (Patient taking differently: Take 1 tablet by mouth every morning) 30 tablet 11    nicotine (NICODERM CQ) 14 MG/24HR 24 hr patch Place 1 patch onto the skin every 24 hours      nicotine (NICORETTE) 4 MG lozenge Place 1 lozenge (4 mg) inside cheek every hour as needed for smoking cessation 100 lozenge 0    nicotine (NICOTROL) 10 MG inhaler Use 1 cartridge as needed for urge to smoke by puffing over course of 20min.  Use 6-16 cart/day; reduce number of cart/day over 6-12 weeks. 160 each 0    olopatadine (PATADAY) 0.2 % ophthalmic solution Place 0.05 mLs (1 drop) into both eyes daily 2.5 mL 11    omega-3 acid ethyl esters (LOVAZA) 1 g capsule Take 2 capsules (2 g) by mouth 2 times daily (Patient taking differently: Take 1 g by mouth 2 times daily Take 2 capsules (2 g) by mouth 2 times daily) 360 capsule 3    omeprazole (PRILOSEC) 20 MG DR capsule TAKE 1 CAPSULE TWICE A  capsule 3    polyethylene glycol-propylene glycol (SYSTANE) 0.4-0.3 % SOLN ophthalmic solution Place 1 drop into both eyes 4 times daily 5 mL 11    polyethylene glycol-propylene glycol (SYSTANE) 0.4-0.3 % SOLN ophthalmic solution Place 1 drop into both eyes 4 times daily (Patient taking differently: Place 1 drop into both eyes 3 times daily) 5 mL 11    prednisoLONE acetate (PRED FORTE) 1 % ophthalmic suspension Place 1 drop Into the left eye 4 times daily 5 mL 0    predniSONE (DELTASONE) 20 MG tablet Take 2 tablets (40 mg) by mouth daily 10 tablet 3    pregabalin (LYRICA) 150 MG capsule Take 1 capsule (150 mg) by  mouth 2 times daily 60 capsule 3    roflumilast (DALIRESP) 250 MCG TABS tablet Take 1 tablet (250 mcg) by mouth daily for 28 days, THEN 2 tablets (500 mcg) daily for 340 days. 708 tablet 0    semaglutide (OZEMPIC, 0.25 OR 0.5 MG/DOSE,) 2 MG/3ML pen Inject 0.5 mg Subcutaneous once a week 3 mL 11    STATIN NOT PRESCRIBED (INTENTIONAL) Please choose reason not prescribed from choices below.       ALLERGIES  Allergies   Allergen Reactions    Interferons Dermatitis    Penicillins Hives    Aspirin      325mg     Colon Care       PHYSICAL EXAM  There were no vitals taken for this visit.  GEN: NAD    Remainder of physical exam deferred due to public health emergency and limitations of video visit.    LABORATORY AND IMAGING STUDIES    Resp culture on FNA - 1+ strep pneumoniae.   Path report reviewed.     CT chest 7/19/23 was personally reviewed and interpreted by me    Virtual Visit Details  Type of service:  Video Visit   Video Start Time: 8:13 AM  Video End Time:8:25 AM  Originating Location (pt. Location): Home  Distant Location (provider location):  On-site  Platform used for Video Visit: Natty Leung MD

## 2023-07-27 NOTE — PROGRESS NOTES
[facility]    PATIENT NAME: Jenn Aparicio  MRN # [id number]   DATE OF VISIT: July 27, 2023  YOB: 1955     CANCER TYPE: Lung adenocarcinoma  STAGE: IA2 [id number] poorly diff adeno RLL, then [id number] IA2 suspected NSCLC RUL, medically inoperable     ECOG PS: 2    PD-L1: N/A  Lung panel: N/A  NGS: N/A    SUMMARY  12/24/15 PET/CT  1/13/15 CT lung bx. Adenocarcinoma  2/8/16 R thoracotomy, RLL lobectomy (Dr. Cunha). Adenocarcinoma, 1.1 cm, assoicated with atypical adenomatous hyperplasia  10/18/19 CTA for shortness of breath. New 1.3 cm RUL nodule  11/2019 PET/CT. 1.1 cm RUL hypermetabolic nodule (SUV 12.6)  12/26/19 Brain MRI negative for mets  1/14~1/28/20 SBRT to RUL nodule, 5000 cGy, every other day, 1000 cGy (Dr. Currie at [facility] [place] No bx due to O2 dependence and too much risk  8/19/20 First visit with me   11/29/21 CT chest. New 10 mm RUL nodule  1/11/22 CT chest. New 7.2 mm RUL nodule, unchanged RUL nodules  3/31/22 CT chest. New RUL nodule from Jan 2022 7-->10 mm, new 5 mm ROBERT nodule  5/20~5/24/22 [facility] for COPD exacerbation.   6/24/22 CT chest non contrast.   8/16/22 EGD. 3 cm hiatal hernia, o/w normal  8/31/22 CT chest. 2.5 x 1.3 cm R midlung subpleural nodularity (4:98) previously 2.3 x 1.1 cm, slightly increased solid component   10/5/22 PET/CT. 2 x 1.3 cm irregular opacity posterior RUL (SUV 2.46), 2.4 x 0.8 cm linear opacity (SUV 4.76); there was bronchiectasia on prior imaging. Stable 1.1 x 0.8 cm non FDG avid RUL opacity and unchanged 4 mm posterior RUL nodule. No adenopathy. Inferior right breast uptake (SUV 6.09) likely infectious or inflammatory.   2/3~2/9/23 [facility] for COPD exacerbation  2/4/23 CTA during hospitalization. No significant change in 2.3 x 1.5 cm spiculated nodule in the right upper lobe (series 7 image 94) with surrounding linear parenchymal opacities and subtle nodularity  2/20~2/24/23 [facility] for COPD exacerbation.   3/1/23 CT chest non contrast. 2.3 cm posterior RUL  irregular nodule unchanged from 12/1/22 however slightly increased soft tissue component compared to 10/5/22 PET. Stable ROBERT mucous plugging with micronodularity.  3/22/23 CT chest. Stable compared to 3/1/23  5/25/23 CT chest abd w/contrast. Unchanged 1.3 x 0.5 cm R apical nodule, unchnaged 2.4 x 1.3 cm R upper nodular consolidation. New nodular/masslike area of consolidation anteriorly and laterally measuring 3.3 x 1.4 cm, not present on CT 3/22/23. No adenopathy. Consider PET or close surveillance CT in 3 months  7/19/23 Bronch, EBUS (Dr. Garcia). 11L negative, scant cells. 4L unsatisfactory for eval. 8 negative, scant cellularity, 4R unsatisfactory, 11R negative, scan cellularity, RUL negative, showed acute inflammation, limited by scant cellularity.      ASSESSMENT AND PLAN  NSCLC, adeno, RUL: PET worrisome for recurrent disease, then had bx 7/19. Path - mostly inadequate sampling but messaged with Dr. Garcia - was really in the RUL consolidated area, could have missed something more peripheral, etc. I discussed these nuances with Jenn, but overall, I think we can be satisfied for the moment that we tried as hard as we could to prove or disprove cancer, and for now, will continue surveillance. CT chest/abd in 3 months     Strep: From FNA 7/19. Discussed with Dr. Garcia, prob worth treating, levofloxacin 500 mg daily x 7 days. Potential side effects reviewed. She was vaccinated back in Jan 2022.     COPD: Some recent exacerbations earlier this year, more stable over the warmer months. Not actively discussed today. Remains O2 dependent.    Dysphagia: Met with Dr. Briseno in GI 7/18/23, has had pretty extensive evaluation before. Next step is video swallow, possibly EGD if video swallow negative. No abnormalities in the head/neck are on PET 6/30/23, which is reassuring from a cancer perspective.     DM2: Seeing Dr. Shabazz.    Peripheral neuropathy: Not discussed today. Mostly driven by DM    25 minutes spent by me on the  date of the encounter doing chart review, history and exam, documentation and further activities per the note     Zarina Leung MD  Associate Professor of Medicine  Hematology, Oncology and Transplantation      SUBJECTIVE  Jenn returns to review bx results.   Doing ok. Busy with appointments. No other major changes since our last visit     PAST MEDICAL HISTORY  Lung adenocarcinoma  DM2 with neuropathy  HCV IA, undetectable in 2019, treated  COPD. O2 dependent since late 2018. PFT 11/26/19. FEV1 0.64 (30%), FVC 1.69 (63%), DLCOunc 9.8 (38%).  HTN  H/o ITP  H/o disk herniation  Ankle surgery  Median neuropathy R  H/o sessile colon polyps 2014, h/o adenomas before that. Colonoscopy 2/7/18 @ [facility] [place]. Sessile serated adenoma x 1, tubular adenoma x 3  H/o chronic LBP  Dyslipidemia  Cataractsnote    CURRENT OUTPATIENT MEDICATIONS  Current Outpatient Medications   Medication Sig Dispense Refill     acetylcysteine (MUCOMYST) 10 % nebulizer solution Inhale 4 mLs into the lungs 4 times daily (Patient taking differently: Inhale 4 mLs into the lungs as needed) 480 mL 0     albuterol (PROAIR HFA/PROVENTIL HFA/VENTOLIN HFA) 108 (90 Base) MCG/ACT inhaler USE 1 OR 2 INHALATIONS EVERY 4 HOURS AS NEEDED (Patient taking differently: Inhale 1 puff into the lungs daily USE 1 OR 2 INHALATIONS EVERY 4 HOURS AS NEEDED) 17 g 3     albuterol (PROVENTIL) (2.5 MG/3ML) 0.083% neb solution Take 1 vial (2.5 mg) by nebulization 4 times daily 360 mL 0     Alpha Lipoic Acid 200 MG CAPS Take 1 capsule by mouth daily (Patient taking differently: Take 1 capsule by mouth every morning) 90 capsule 3     amLODIPine (NORVASC) 10 MG tablet Take 1 tablet (10 mg) by mouth daily 90 tablet 3     ASPIRIN LOW DOSE 81 MG EC tablet TAKE 1 TABLET DAILY (Patient taking differently: Take 81 mg by mouth every morning) 90 tablet 2     buPROPion (WELLBUTRIN XL) 300 MG 24 hr tablet Take 1 tablet (300 mg) by mouth every morning 90 tablet 3     cetirizine (ZYRTEC) 10 MG  tablet TAKE 1 TABLET DAILY (Patient taking differently: Take 10 mg by mouth every morning) 30 tablet 9     Continuous Blood Gluc Sensor (FREESTYLE GIOVANNI 2 SENSOR) MISC 1 each every 14 days For use with Freestyle Giovanni 2  for continuous monitioring of blood glucose levels. Replace sensor every 14 days. 2 each 11     cyanocobalamin (VITAMIN B-12) 500 MCG tablet Take 1 tablet (500 mcg) by mouth daily (Patient taking differently: Take 500 mcg by mouth every morning) 90 tablet 1     doxycycline hyclate (VIBRA-TABS) 100 MG tablet Take 1 tablet (100 mg) by mouth 2 times daily (Patient taking differently: Take 100 mg by mouth every morning) 10 tablet 1     empagliflozin (JARDIANCE) 25 MG TABS tablet Take 1 tablet (25 mg) by mouth daily (Patient taking differently: Take 25 mg by mouth every morning) 90 tablet 1     fluticasone (FLONASE) 50 MCG/ACT nasal spray Spray 1 spray into both nostrils daily Use at night before bed (Patient taking differently: Spray 1 spray into both nostrils 2 times daily Use at night before bed) 15.8 mL 7     Fluticasone-Umeclidin-Vilanterol (TRELEGY ELLIPTA) 200-62.5-25 MCG/INH oral inhaler Inhale 1 puff into the lungs daily (Patient taking differently: Inhale 1 puff into the lungs every morning) 28 each 5     GLUCOSAMINE-CHONDROITIN -400 MG tablet TAKE 1 TABLET DAILY (Patient taking differently: Take 1 tablet by mouth every evening) 90 tablet 3     insulin pen needle (BD MARCIO U/F) 32G X 4 MM miscellaneous Use 1 pen needle daily or as directed. 100 each 1     ipratropium - albuterol 0.5 mg/2.5 mg/3 mL (DUONEB) 0.5-2.5 (3) MG/3ML neb solution Take 1 vial by nebulization every 6 hours as needed for shortness of breath, wheezing or cough       ketorolac (ACULAR) 0.5 % ophthalmic solution Place 1 drop Into the left eye 4 times daily (Patient taking differently: Place 1 drop Into the left eye 2 times daily) 5 mL 0     ketotifen (ZADITOR) 0.025 % ophthalmic solution Place 1 drop into both  eyes 2 times daily       moxifloxacin (VIGAMOX) 0.5 % ophthalmic solution Place 1 drop Into the left eye 4 times daily 3 mL 0     multivitamin w/minerals (THERA-VIT-M) tablet Take 1 tablet by mouth daily (Patient taking differently: Take 1 tablet by mouth every morning) 30 tablet 11     nicotine (NICODERM CQ) 14 MG/24HR 24 hr patch Place 1 patch onto the skin every 24 hours       nicotine (NICORETTE) 4 MG lozenge Place 1 lozenge (4 mg) inside cheek every hour as needed for smoking cessation 100 lozenge 0     nicotine (NICOTROL) 10 MG inhaler Use 1 cartridge as needed for urge to smoke by puffing over course of 20min.  Use 6-16 cart/day; reduce number of cart/day over 6-12 weeks. 160 each 0     olopatadine (PATADAY) 0.2 % ophthalmic solution Place 0.05 mLs (1 drop) into both eyes daily 2.5 mL 11     omega-3 acid ethyl esters (LOVAZA) 1 g capsule Take 2 capsules (2 g) by mouth 2 times daily (Patient taking differently: Take 1 g by mouth 2 times daily Take 2 capsules (2 g) by mouth 2 times daily) 360 capsule 3     omeprazole (PRILOSEC) 20 MG DR capsule TAKE 1 CAPSULE TWICE A  capsule 3     polyethylene glycol-propylene glycol (SYSTANE) 0.4-0.3 % SOLN ophthalmic solution Place 1 drop into both eyes 4 times daily 5 mL 11     polyethylene glycol-propylene glycol (SYSTANE) 0.4-0.3 % SOLN ophthalmic solution Place 1 drop into both eyes 4 times daily (Patient taking differently: Place 1 drop into both eyes 3 times daily) 5 mL 11     prednisoLONE acetate (PRED FORTE) 1 % ophthalmic suspension Place 1 drop Into the left eye 4 times daily 5 mL 0     predniSONE (DELTASONE) 20 MG tablet Take 2 tablets (40 mg) by mouth daily 10 tablet 3     pregabalin (LYRICA) 150 MG capsule Take 1 capsule (150 mg) by mouth 2 times daily 60 capsule 3     roflumilast (DALIRESP) 250 MCG TABS tablet Take 1 tablet (250 mcg) by mouth daily for 28 days, THEN 2 tablets (500 mcg) daily for 340 days. 708 tablet 0     semaglutide (OZEMPIC, 0.25 OR  0.5 MG/DOSE,) 2 MG/3ML pen Inject 0.5 mg Subcutaneous once a week 3 mL 11     STATIN NOT PRESCRIBED (INTENTIONAL) Please choose reason not prescribed from choices below.       ALLERGIES  Allergies   Allergen Reactions     Interferons Dermatitis     Penicillins Hives     Aspirin      325mg      Colon Care       PHYSICAL EXAM  There were no vitals taken for this visit.  GEN: NAD    Remainder of physical exam deferred due to public health emergency and limitations of video visit.    LABORATORY AND IMAGING STUDIES    Resp culture on FNA - 1+ strep pneumoniae.   Path report reviewed.     CT chest 7/19/23 was personally reviewed and interpreted by me    Virtual Visit Details  Type of service:  Video Visit   Video Start Time: 8:13 AM  Video End Time:8:25 AM  Originating Location (pt. Location): Home  Distant Location (provider location):  On-site  Platform used for Video Visit: [employer]

## 2023-07-28 ENCOUNTER — TELEPHONE (OUTPATIENT)
Dept: INTERNAL MEDICINE | Facility: CLINIC | Age: 68
End: 2023-07-28

## 2023-07-28 DIAGNOSIS — T63.441A BEE STING REACTION: Primary | ICD-10-CM

## 2023-07-28 RX ORDER — EPINEPHRINE 0.3 MG/.3ML
0.3 INJECTION SUBCUTANEOUS PRN
Qty: 2 EACH | Refills: 1 | Status: SHIPPED | OUTPATIENT
Start: 2023-07-28 | End: 2024-07-09

## 2023-07-28 NOTE — TELEPHONE ENCOUNTER
[facility] Call Center    Phone Message    May a detailed message be left on voicemail: yes     Reason for Call: Medication Refill Request    Has the patient contacted the pharmacy for the refill? Yes   Name of medication being requested:   EPINEPHrine 0.2 mg (0.2 mL of 1 mg/mL) in 20 mL saline   Provider who prescribed the medication: Dr Garcia.  Pharmacy:    [id number] - [facility], [place] - [address] AT [facility] [address]      Date medication is needed  ASAP  patient is out of it due it was         Action Taken: Message routed to:  [facility] ([facility]): P[facility]    Travel Screening: Not Applicable

## 2023-07-28 NOTE — TELEPHONE ENCOUNTER
Called patient. She needs an EPI PEN for a bee sting allergy not the Epi solution given once during the procedure in bronchoscopy? Not sure how we are confused on this. Will refill. Confirmed this through [facility] charts.    Rommel Nice RN on 7/28/2023 at 2:04 PM

## 2023-08-01 ENCOUNTER — DOCUMENTATION ONLY (OUTPATIENT)
Dept: INTERNAL MEDICINE | Facility: CLINIC | Age: 68
End: 2023-08-01
Payer: COMMERCIAL

## 2023-08-01 NOTE — PROGRESS NOTES
Type of Form Received:     Form Received (Date) 8/1/23   Form Filled out Yes, 8/3/23   Placed in provider folder Yes

## 2023-08-03 ENCOUNTER — MEDICAL CORRESPONDENCE (OUTPATIENT)
Dept: HEALTH INFORMATION MANAGEMENT | Facility: CLINIC | Age: 68
End: 2023-08-03
Payer: COMMERCIAL

## 2023-08-03 ENCOUNTER — DOCUMENTATION ONLY (OUTPATIENT)
Dept: INTERNAL MEDICINE | Facility: CLINIC | Age: 68
End: 2023-08-03
Payer: COMMERCIAL

## 2023-08-03 NOTE — PROGRESS NOTES
Type of Form Received: Verbal Order    Form Received (Date) 08/03/23   Form Filled out Yes 8/9/23   Placed in provider folder Yes

## 2023-08-07 DIAGNOSIS — J44.9 CHRONIC OBSTRUCTIVE PULMONARY DISEASE, UNSPECIFIED COPD TYPE (H): ICD-10-CM

## 2023-08-08 ENCOUNTER — HOSPITAL ENCOUNTER (OUTPATIENT)
Facility: CLINIC | Age: 68
Discharge: HOME | End: 2023-08-08
Attending: INTERNAL MEDICINE | Admitting: INTERNAL MEDICINE
Payer: COMMERCIAL

## 2023-08-08 ENCOUNTER — ANESTHESIA (OUTPATIENT)
Dept: ANESTHESIOLOGY | Facility: CLINIC | Age: 68
End: 2023-08-08
Payer: COMMERCIAL

## 2023-08-08 ENCOUNTER — MEDICAL CORRESPONDENCE (OUTPATIENT)
Dept: INTERNAL MEDICINE | Facility: CLINIC | Age: 68
End: 2023-08-08

## 2023-08-08 VITALS
OXYGEN SATURATION: 100 % | RESPIRATION RATE: 16 BRPM | SYSTOLIC BLOOD PRESSURE: 131 MMHG | DIASTOLIC BLOOD PRESSURE: 57 MMHG | HEART RATE: 107 BPM

## 2023-08-08 LAB
GLUCOSE BLDC GLUCOMTR-MCNC: 115 MG/DL (ref 70–99)
UPPER GI ENDOSCOPY: NORMAL

## 2023-08-08 PROCEDURE — 250N000009 HC RX 250: Performed by: REGISTERED NURSE

## 2023-08-08 PROCEDURE — 43239 EGD BIOPSY SINGLE/MULTIPLE: CPT | Performed by: INTERNAL MEDICINE

## 2023-08-08 PROCEDURE — 43249 ESOPH EGD DILATION <30 MM: CPT | Performed by: INTERNAL MEDICINE

## 2023-08-08 PROCEDURE — 88305 TISSUE EXAM BY PATHOLOGIST: CPT | Mod: 26 | Performed by: PATHOLOGY

## 2023-08-08 PROCEDURE — 250N000011 HC RX IP 250 OP 636: Performed by: REGISTERED NURSE

## 2023-08-08 PROCEDURE — 370N000017 HC ANESTHESIA TECHNICAL FEE, PER MIN: Performed by: INTERNAL MEDICINE

## 2023-08-08 PROCEDURE — 82962 GLUCOSE BLOOD TEST: CPT

## 2023-08-08 PROCEDURE — 88305 TISSUE EXAM BY PATHOLOGIST: CPT | Mod: TC | Performed by: INTERNAL MEDICINE

## 2023-08-08 PROCEDURE — 258N000003 HC RX IP 258 OP 636: Performed by: REGISTERED NURSE

## 2023-08-08 RX ORDER — NALOXONE HYDROCHLORIDE 0.4 MG/ML
0.2 INJECTION, SOLUTION INTRAMUSCULAR; INTRAVENOUS; SUBCUTANEOUS
Status: DISCONTINUED | OUTPATIENT
Start: 2023-08-08 | End: 2023-08-08 | Stop reason: HOSPADM

## 2023-08-08 RX ORDER — ONDANSETRON 2 MG/ML
4 INJECTION INTRAMUSCULAR; INTRAVENOUS EVERY 6 HOURS PRN
Status: DISCONTINUED | OUTPATIENT
Start: 2023-08-08 | End: 2023-08-08 | Stop reason: HOSPADM

## 2023-08-08 RX ORDER — ONDANSETRON 4 MG/1
4 TABLET, ORALLY DISINTEGRATING ORAL EVERY 30 MIN PRN
Status: DISCONTINUED | OUTPATIENT
Start: 2023-08-08 | End: 2023-08-08 | Stop reason: HOSPADM

## 2023-08-08 RX ORDER — SODIUM CHLORIDE, SODIUM LACTATE, POTASSIUM CHLORIDE, CALCIUM CHLORIDE 600; 310; 30; 20 MG/100ML; MG/100ML; MG/100ML; MG/100ML
INJECTION, SOLUTION INTRAVENOUS CONTINUOUS PRN
Status: DISCONTINUED | OUTPATIENT
Start: 2023-08-08 | End: 2023-08-08

## 2023-08-08 RX ORDER — PROCHLORPERAZINE MALEATE 5 MG
5 TABLET ORAL EVERY 6 HOURS PRN
Status: DISCONTINUED | OUTPATIENT
Start: 2023-08-08 | End: 2023-08-08 | Stop reason: HOSPADM

## 2023-08-08 RX ORDER — ONDANSETRON 4 MG/1
4 TABLET, ORALLY DISINTEGRATING ORAL EVERY 6 HOURS PRN
Status: DISCONTINUED | OUTPATIENT
Start: 2023-08-08 | End: 2023-08-08 | Stop reason: HOSPADM

## 2023-08-08 RX ORDER — PROPOFOL 10 MG/ML
INJECTION, EMULSION INTRAVENOUS CONTINUOUS PRN
Status: DISCONTINUED | OUTPATIENT
Start: 2023-08-08 | End: 2023-08-08

## 2023-08-08 RX ORDER — OXYCODONE HYDROCHLORIDE 5 MG/1
5 TABLET ORAL EVERY 4 HOURS PRN
Status: DISCONTINUED | OUTPATIENT
Start: 2023-08-08 | End: 2023-08-08 | Stop reason: HOSPADM

## 2023-08-08 RX ORDER — LIDOCAINE HYDROCHLORIDE 20 MG/ML
INJECTION, SOLUTION INFILTRATION; PERINEURAL PRN
Status: DISCONTINUED | OUTPATIENT
Start: 2023-08-08 | End: 2023-08-08

## 2023-08-08 RX ORDER — NALOXONE HYDROCHLORIDE 0.4 MG/ML
0.4 INJECTION, SOLUTION INTRAMUSCULAR; INTRAVENOUS; SUBCUTANEOUS
Status: DISCONTINUED | OUTPATIENT
Start: 2023-08-08 | End: 2023-08-08 | Stop reason: HOSPADM

## 2023-08-08 RX ORDER — GLYCOPYRROLATE 0.2 MG/ML
INJECTION, SOLUTION INTRAMUSCULAR; INTRAVENOUS PRN
Status: DISCONTINUED | OUTPATIENT
Start: 2023-08-08 | End: 2023-08-08

## 2023-08-08 RX ORDER — FLUMAZENIL 0.1 MG/ML
0.2 INJECTION, SOLUTION INTRAVENOUS
Status: DISCONTINUED | OUTPATIENT
Start: 2023-08-08 | End: 2023-08-08 | Stop reason: HOSPADM

## 2023-08-08 RX ORDER — LIDOCAINE 40 MG/G
CREAM TOPICAL
Status: DISCONTINUED | OUTPATIENT
Start: 2023-08-08 | End: 2023-08-08 | Stop reason: HOSPADM

## 2023-08-08 RX ORDER — ACETAMINOPHEN 325 MG/1
975 TABLET ORAL EVERY 6 HOURS PRN
Status: DISCONTINUED | OUTPATIENT
Start: 2023-08-08 | End: 2023-08-08 | Stop reason: HOSPADM

## 2023-08-08 RX ORDER — SODIUM CHLORIDE, SODIUM LACTATE, POTASSIUM CHLORIDE, CALCIUM CHLORIDE 600; 310; 30; 20 MG/100ML; MG/100ML; MG/100ML; MG/100ML
INJECTION, SOLUTION INTRAVENOUS CONTINUOUS
Status: DISCONTINUED | OUTPATIENT
Start: 2023-08-08 | End: 2023-08-08 | Stop reason: HOSPADM

## 2023-08-08 RX ORDER — ONDANSETRON 2 MG/ML
4 INJECTION INTRAMUSCULAR; INTRAVENOUS
Status: DISCONTINUED | OUTPATIENT
Start: 2023-08-08 | End: 2023-08-08 | Stop reason: HOSPADM

## 2023-08-08 RX ORDER — PROPOFOL 10 MG/ML
INJECTION, EMULSION INTRAVENOUS PRN
Status: DISCONTINUED | OUTPATIENT
Start: 2023-08-08 | End: 2023-08-08

## 2023-08-08 RX ORDER — ONDANSETRON 2 MG/ML
4 INJECTION INTRAMUSCULAR; INTRAVENOUS EVERY 30 MIN PRN
Status: DISCONTINUED | OUTPATIENT
Start: 2023-08-08 | End: 2023-08-08 | Stop reason: HOSPADM

## 2023-08-08 RX ADMIN — PROPOFOL 150 MCG/KG/MIN: 10 INJECTION, EMULSION INTRAVENOUS at 14:02

## 2023-08-08 RX ADMIN — SODIUM CHLORIDE, POTASSIUM CHLORIDE, SODIUM LACTATE AND CALCIUM CHLORIDE: 600; 310; 30; 20 INJECTION, SOLUTION INTRAVENOUS at 14:02

## 2023-08-08 RX ADMIN — PROPOFOL 50 MG: 10 INJECTION, EMULSION INTRAVENOUS at 14:02

## 2023-08-08 RX ADMIN — LIDOCAINE HYDROCHLORIDE 40 MG: 20 INJECTION, SOLUTION INFILTRATION; PERINEURAL at 14:02

## 2023-08-08 RX ADMIN — GLYCOPYRROLATE 0.2 MG: 0.2 INJECTION, SOLUTION INTRAMUSCULAR; INTRAVENOUS at 14:10

## 2023-08-08 ASSESSMENT — LIFESTYLE VARIABLES: TOBACCO_USE: 1

## 2023-08-08 ASSESSMENT — COPD QUESTIONNAIRES
CAT_SEVERITY: SEVERE
COPD: 1

## 2023-08-08 ASSESSMENT — ACTIVITIES OF DAILY LIVING (ADL)
ADLS_ACUITY_SCORE: 33
ADLS_ACUITY_SCORE: 33
ADLS_ACUITY_SCORE: 35

## 2023-08-08 ASSESSMENT — ENCOUNTER SYMPTOMS: SEIZURES: 0

## 2023-08-08 NOTE — ANESTHESIA POSTPROCEDURE EVALUATION
Patient: Jenn Aparicio    Procedure: Procedure(s):  ESOPHAGOGASTRODUODENOSCOPY, WITH BIOPSY  ESOPHAGOGASTRODUODENOSCOPY, WITH BALLOON DILATION OF LESS THAN 30 MILLIMETERS       Anesthesia Type:  MAC    Note:  Disposition: Outpatient   Postop Pain Control: Uneventful            Sign Out: Well controlled pain   PONV: No   Neuro/Psych: Uneventful            Sign Out: Acceptable/Baseline neuro status   Airway/Respiratory: Uneventful            Sign Out: Acceptable/Baseline resp. status   CV/Hemodynamics: Uneventful            Sign Out: Acceptable CV status; No obvious hypovolemia; No obvious fluid overload   Other NRE: NONE   DID A NON-ROUTINE EVENT OCCUR? No           Last vitals:  Vitals Value Taken Time   /57 08/08/23 1500   Temp     Pulse 107 08/08/23 1500   Resp     SpO2 98 % 08/08/23 1507   Vitals shown include unvalidated device data.    Electronically Signed By: Jackie Espinal MD  August 8, 2023  3:08 PM

## 2023-08-08 NOTE — ANESTHESIA CARE TRANSFER NOTE
Patient: Jenn Aparicio    Procedure: Procedure(s):  ESOPHAGOGASTRODUODENOSCOPY, WITH BIOPSY  ESOPHAGOGASTRODUODENOSCOPY, WITH BALLOON DILATION OF LESS THAN 30 MILLIMETERS       Diagnosis: Esophageal dysmotility [K22.4]  Diagnosis Additional Information: No value filed.    Anesthesia Type:   MAC     Note:    Oropharynx: oropharynx clear of all foreign objects and spontaneously breathing  Level of Consciousness: awake  Oxygen Supplementation: nasal cannula  Level of Supplemental Oxygen (L/min / FiO2): 3  Independent Airway: airway patency satisfactory and stable  Dentition: dentition unchanged  Vital Signs Stable: post-procedure vital signs reviewed and stable  Report to RN Given: handoff report given  Patient transferred to: Phase II    Handoff Report: Identifed the Patient, Identified the Reponsible Provider, Reviewed the pertinent medical history, Discussed the surgical course, Reviewed Intra-OP anesthesia mangement and issues during anesthesia, Set expectations for post-procedure period and Allowed opportunity for questions and acknowledgement of understanding      Vitals:See Phase 2 VS flowsheets  Vitals Value Taken Time   BP     Temp     Pulse     Resp     SpO2 93 % 08/08/23 1426   Vitals shown include unvalidated device data.    Electronically Signed By: BENOIT Penaloza CRNA  August 8, 2023  2:27 PM

## 2023-08-08 NOTE — OR NURSING
EGD with biopsies and balloon dilation, performed under MAC, patient tolerated well. Transferred to  and report given to recovery RN.

## 2023-08-08 NOTE — LETTER
"August 14, 2023      Jenn Aparicio  [address]   [place] [address]        Dear ,    We are writing to inform you of your test results.    Your biopsies of the stomach showed non-specific inflammation with no findings of h pylori.     Resulted Orders   Surgical Pathology Exam   Result Value Ref Range    Case Report       Surgical Pathology Report                         Case: [id number]                                  Authorizing Provider:  Chao Rodrigues DO          Collected:           08/08/2023 02:10 PM          Ordering Location:     [facility]          Received:            08/08/2023 02:36 PM                                 Endoscopy                                                                    Pathologist:           Martell Butler MD                                                             Specimen:    Stomach, Gastric biopsy                                                                    Final Diagnosis       A. STOMACH, BIOPSY:  - Gastric mucosa with mild chronic inactive gastritis  - Negative for H. pylori-like organisms on routine staining  - Negative for intestinal metaplasia or dysplasia         Clinical Information       Erythematous mucosa in the gastric body and antrum, biopsied.       Gross Description       A(1). Stomach, Gastric biopsy:  The specimen is received in formalin with proper patient identification, labeled \"stomach\".  The specimen consists of 4 irregular tan soft tissues up to 0.5 cm.  The specimen is submitted entirely in block A1.       Microscopic Description       Microscopic examination has been performed.    A resident/fellow was involved in the initial review, preparation, and/or interpretation of this case.  I, as the senior physician, attest that I have personally reviewed all specimens and or slides, including the listed special stains, and used them with my medical judgement to determine the final diagnosis.        Case was reviewed by " the following:  Pathology Fellow: Susan Banuelos MD  A resident or fellow in a training program was involved in the initial review, preparation, and/or interpretation of this case.  I, as the senior physician, attest that I have personally reviewed all specimens and or slides, including the listed special stains, and used them with my medical judgement to determine the final diagnosis.              Performing Labs       The technical component of this testing was completed at [facility]      Case Images         If you have any questions or concerns, please call the clinic at the number listed above.       Sincerely,      Chao Rodrigues, DO

## 2023-08-08 NOTE — ANESTHESIA PREPROCEDURE EVALUATION
Anesthesia Pre-Procedure Evaluation    Patient: Jenn Aparicio   MRN: [id number] : 1955        Procedure : Procedure(s):  Esophagoscopy, gastroscopy, duodenoscopy (EGD), combined          Past Medical History:   Diagnosis Date    Adenocarcinoma, lung (H)     Asthma     Ectopic pregnancy     Esophageal reflux     Pulmonary emphysema (H)     Very severe FEV1<30% predicted    Type II diabetes mellitus (H)       Past Surgical History:   Procedure Laterality Date    16                R thoracotomy, RLL lobectomy (Dr. Cunha). Adenocarcinoma, 1.1 cm, assoicated with atypical adenomatous hyperplasia Right 2016    R thoracotomy, RLL lobectomy (Dr. Cunha). Adenocarcinoma, 1.1 cm, assoicated with atypical adenomatous hyperplasia    BRONCHOSCOPY, WITH BIOPSY, ROBOT ASSISTED N/A 2023    Procedure: robot assisted Ion BRONCHOSCOPY, WITH BIOPSY;  Surgeon: Patria Garcia MD;  Location: [facility] OR    ENDOBRONCHIAL ULTRASOUND FLEXIBLE N/A 2023    Procedure: Endobronchial ultrasound flexible;  Surgeon: Patria Garcia MD;  Location: [facility] OR    ESOPHAGOSCOPY, GASTROSCOPY, DUODENOSCOPY (EGD), COMBINED N/A 2022    Procedure: ESOPHAGOGASTRODUODENOSCOPY (EGD);  Surgeon: Travis Briseno MD;  Location:  GI    ORTHOPEDIC SURGERY        Allergies   Allergen Reactions    Bee Venom Anaphylaxis    Interferons Dermatitis    Penicillins Hives    Aspirin      325mg     Colon Care       Social History     Tobacco Use    Smoking status: Former     Packs/day: 0.25     Types: Cigarettes    Smokeless tobacco: Never    Tobacco comments:     2023 Patient using 14 mg patch, wants to have prescription for Nicotrol inhaler, took workbook   Substance Use Topics    Alcohol use: Not Currently      Wt Readings from Last 1 Encounters:   23 90.7 kg (200 lb)        Anesthesia Evaluation   Pt has had prior anesthetic.     No history of anesthetic complications       ROS/MED HX  ENT/Pulmonary:     (+) sleep apnea (not using  CPAP, but has one), doesn't use CPAP,              tobacco use, Past use,       severe,  COPD, O2 dependent,             Neurologic:  - neg neurologic ROS  (-) no seizures and no CVA   Cardiovascular:     (+)  hypertension- -   -  - -   Taking blood thinners                              Previous cardiac testing   Echo: Date: 9/2022 Results:  Interpretation Summary  Left ventricular size, wall motion and function are normal. The ejection  fraction is 55-60%.     Right ventricular function, chamber size, wall motion, and thickness are  normal.     No pericardial effusion is present.     The inferior vena cava is normal.     No significant valvular abnormalities present.     There is no prior study for direct comparison.  Stress Test:  Date: Results:    ECG Reviewed:  Date: 2/20/23 Results:  Sinus tachycardia, septal infarct  Cath:  Date: Results:      METS/Exercise Tolerance: 1 - Eating, dressing Comment: Limited activity. Can walk with walker but limited due to RUSSELL for past 2 years.    Hematologic:     (+)       history of blood transfusion, no previous transfusion reaction,     (-) history of blood clots   Musculoskeletal:  - neg musculoskeletal ROS     GI/Hepatic: Comment: Esophageal dysmotility     (+) GERD,    hepatitis resolved      hepatitis type C,        Renal/Genitourinary:  - neg Renal ROS     Endo: Comment: Prednisone on hand, but no recent use    (+)  type II DM, Last HgA1c: 10.5, date: 4/2023, Not using insulin,  Normal glucose range: 50 at night, 150-200,               Psychiatric/Substance Use:  - neg psychiatric ROS     Infectious Disease:  - neg infectious disease ROS     Malignancy:   (+) Malignancy, History of Lung.  Lung CA status post Radiation and Surgery.      Other:            Physical Exam    Airway             Respiratory Devices and Support         Dental       (+) Multiple visibly decayed, broken teeth      Cardiovascular             Pulmonary                   OUTSIDE LABS:  CBC:   Lab  Results   Component Value Date    WBC 6.1 05/25/2023    WBC 8.5 02/24/2023    HGB 13.8 05/25/2023    HGB 13.0 02/24/2023    HCT 45.0 05/25/2023    HCT 43.4 02/24/2023     05/25/2023     02/24/2023     BMP:   Lab Results   Component Value Date     05/25/2023     04/17/2023    POTASSIUM 4.0 05/25/2023    POTASSIUM 4.9 04/17/2023    CHLORIDE 105 05/25/2023    CHLORIDE 105 04/17/2023    CO2 32 (H) 05/25/2023    CO2 36 (H) 04/17/2023    BUN 8.1 05/25/2023    BUN 7.3 (L) 04/17/2023    CR 0.5 05/25/2023    CR 0.55 05/25/2023     (H) 07/19/2023     (H) 07/19/2023     COAGS:   Lab Results   Component Value Date    PTT 30 04/30/2008    INR 1.02 02/03/2023     POC:   Lab Results   Component Value Date     (H) 06/25/2021    HCG Negative 04/20/2011     HEPATIC:   Lab Results   Component Value Date    ALBUMIN 4.0 05/25/2023    PROTTOTAL 7.0 05/25/2023    ALT 9 (L) 05/25/2023    AST 14 05/25/2023    ALKPHOS 89 05/25/2023    BILITOTAL 0.3 05/25/2023    LACEY 15 02/07/2008     OTHER:   Lab Results   Component Value Date    PH 7.36 02/23/2023    LACT 0.9 02/20/2023    LACT 0.8 02/20/2023    A1C 10.5 (H) 04/17/2023    LUIGI 9.1 05/25/2023    PHOS 2.9 05/20/2022    MAG 2.1 05/22/2022    TSH 2.65 08/31/2022    CRP <2.9 05/22/2022       Anesthesia Plan    ASA Status:  3    NPO Status:  NPO Appropriate    Anesthesia Type: MAC.     - Reason for MAC: straight local not clinically adequate   Induction: Intravenous.   Maintenance: TIVA.        Consents            Postoperative Care    Pain management: Multi-modal analgesia.   PONV prophylaxis: Background Propofol Infusion     Comments:                Jackie Espinal MD

## 2023-08-08 NOTE — H&P
Jenn Aparicio  [id number]  female  68 year old      Reason for procedure/surgery: egd, empiric dilation UES, dysphagia    Patient Active Problem List   Diagnosis    Vaginitis    Postmenopausal bleeding    Cervical stenosis (uterine cervix)    Pulmonary emphysema (H)    Type 2 diabetes mellitus without complication, without long-term current use of insulin (H)    Primary lung adenocarcinoma (H) STAGE: IA2 [id number] poorly diff adeno RLL, then [id number] IA2 suspected NSCLC RUL, medically inoperable     Chronic obstructive pulmonary disease, unspecified COPD type (H)    Malnutrition (H)    Diabetic neuropathy (H)    Hypoxia    Shortness of breath    Chest pain, unspecified type    Hypokalemia    Gastroesophageal reflux disease without esophagitis    Esophageal dysphagia    COPD exacerbation (H)    Acute and chronic respiratory failure with hypoxia (H)    Combined forms of age-related cataract of both eyes       Past Surgical History:    Past Surgical History:   Procedure Laterality Date    2/8/16                R thoracotomy, RLL lobectomy (Dr. Cunha). Adenocarcinoma, 1.1 cm, assoicated with atypical adenomatous hyperplasia Right 02/08/2016    R thoracotomy, RLL lobectomy (Dr. Cunha). Adenocarcinoma, 1.1 cm, assoicated with atypical adenomatous hyperplasia    BRONCHOSCOPY, WITH BIOPSY, ROBOT ASSISTED N/A 7/19/2023    Procedure: robot assisted Ion BRONCHOSCOPY, WITH BIOPSY;  Surgeon: Patria Garcia MD;  Location:  OR    ENDOBRONCHIAL ULTRASOUND FLEXIBLE N/A 7/19/2023    Procedure: Endobronchial ultrasound flexible;  Surgeon: Patria Garcia MD;  Location:  OR    ESOPHAGOSCOPY, GASTROSCOPY, DUODENOSCOPY (EGD), COMBINED N/A 8/16/2022    Procedure: ESOPHAGOGASTRODUODENOSCOPY (EGD);  Surgeon: Travis Briseno MD;  Location:  GI    ORTHOPEDIC SURGERY         Past Medical History:   Past Medical History:   Diagnosis Date    Adenocarcinoma, lung (H)     Asthma     Ectopic pregnancy     Esophageal reflux     Pulmonary  emphysema (H)     Very severe FEV1<30% predicted    Type II diabetes mellitus (H)        Social History:   Social History     Tobacco Use    Smoking status: Former     Packs/day: 0.25     Types: Cigarettes    Smokeless tobacco: Never    Tobacco comments:     01/02/2023 Patient using 14 mg patch, wants to have prescription for Nicotrol inhaler, took workbook   Substance Use Topics    Alcohol use: Not Currently       Family History:   Family History   Problem Relation Age of Onset    Thyroid Disease Mother     Cerebrovascular Disease Mother     Hypertension Mother     Hypertension Father     Glaucoma Father     Cancer Sister     Lung Cancer Sister     Diabetes Brother     Cancer Brother     Diabetes Brother     Cancer Brother     Diabetes Brother     Deep Vein Thrombosis (DVT) Daughter     Depression Daughter     Alcohol/Drug Other         self    Diabetes Other         self    Thyroid Disease Other         self    Asthma Other         self    Macular Degeneration No family hx of     Anesthesia Reaction No family hx of        Allergies:   Allergies   Allergen Reactions    Bee Venom Anaphylaxis    Interferons Dermatitis    Penicillins Hives    Aspirin      325mg     Colon Care        Active Medications:   No current outpatient medications on file.       Systemic Review:   CONSTITUTIONAL: NEGATIVE for fever, chills, change in weight  ENT/MOUTH: NEGATIVE for ear, mouth and throat problems  RESP: NEGATIVE for significant cough or SOB  CV: NEGATIVE for chest pain, palpitations or peripheral edema    Physical Examination:   Vital Signs: /66   Pulse 100   Resp 16   SpO2 100%   GENERAL: healthy, alert and no distress  NECK: no adenopathy, no asymmetry, masses, or scars  RESP: lungs clear to auscultation - no rales, rhonchi or wheezes  CV: regular rate and rhythm, normal S1 S2, no S3 or S4, no murmur, click or rub, no peripheral edema and peripheral pulses strong  ABDOMEN: soft, nontender, no hepatosplenomegaly, no  masses and bowel sounds normal  MS: no gross musculoskeletal defects noted, no edema    Plan: Appropriate to proceed as scheduled.      Chao Rodrigues DO  8/8/2023    PCP:  Mitzi Nettles

## 2023-08-09 ENCOUNTER — OFFICE VISIT (OUTPATIENT)
Dept: PULMONOLOGY | Facility: CLINIC | Age: 68
End: 2023-08-09
Attending: STUDENT IN AN ORGANIZED HEALTH CARE EDUCATION/TRAINING PROGRAM
Payer: COMMERCIAL

## 2023-08-09 VITALS — HEART RATE: 101 BPM | SYSTOLIC BLOOD PRESSURE: 122 MMHG | OXYGEN SATURATION: 95 % | DIASTOLIC BLOOD PRESSURE: 71 MMHG

## 2023-08-09 DIAGNOSIS — J30.2 SEASONAL ALLERGIC RHINITIS, UNSPECIFIED TRIGGER: ICD-10-CM

## 2023-08-09 DIAGNOSIS — J43.9 PULMONARY EMPHYSEMA, UNSPECIFIED EMPHYSEMA TYPE (H): ICD-10-CM

## 2023-08-09 DIAGNOSIS — J44.9 CHRONIC OBSTRUCTIVE PULMONARY DISEASE, UNSPECIFIED COPD TYPE (H): Primary | ICD-10-CM

## 2023-08-09 DIAGNOSIS — J96.21 ACUTE AND CHRONIC RESPIRATORY FAILURE WITH HYPOXIA (H): ICD-10-CM

## 2023-08-09 PROBLEM — J44.1 COPD EXACERBATION (H): Status: RESOLVED | Noted: 2023-01-01 | Resolved: 2023-08-09

## 2023-08-09 LAB
PATH REPORT.COMMENTS IMP SPEC: NORMAL
PATH REPORT.COMMENTS IMP SPEC: NORMAL
PATH REPORT.FINAL DX SPEC: NORMAL
PATH REPORT.GROSS SPEC: NORMAL
PATH REPORT.MICROSCOPIC SPEC OTHER STN: NORMAL
PATH REPORT.RELEVANT HX SPEC: NORMAL
PHOTO IMAGE: NORMAL

## 2023-08-09 PROCEDURE — 99213 OFFICE O/P EST LOW 20 MIN: CPT | Mod: GC | Performed by: STUDENT IN AN ORGANIZED HEALTH CARE EDUCATION/TRAINING PROGRAM

## 2023-08-09 PROCEDURE — G0463 HOSPITAL OUTPT CLINIC VISIT: HCPCS | Performed by: STUDENT IN AN ORGANIZED HEALTH CARE EDUCATION/TRAINING PROGRAM

## 2023-08-09 RX ORDER — CETIRIZINE HYDROCHLORIDE 10 MG/1
10 TABLET ORAL EVERY MORNING
Qty: 30 TABLET | Refills: 11 | Status: SHIPPED | OUTPATIENT
Start: 2023-08-09 | End: 2023-10-26

## 2023-08-09 RX ORDER — ROFLUMILAST 250 UG/1
TABLET ORAL
Qty: 152 TABLET | Refills: 0 | Status: SHIPPED | OUTPATIENT
Start: 2023-08-09 | End: 2023-12-13

## 2023-08-09 RX ORDER — FLUTICASONE PROPIONATE 50 MCG
1 SPRAY, SUSPENSION (ML) NASAL 2 TIMES DAILY
Qty: 15.8 ML | Refills: 3 | Status: SHIPPED | OUTPATIENT
Start: 2023-08-09 | End: 2024-07-09

## 2023-08-09 NOTE — LETTER
8/9/2023         RE: Jenn Aparicio  [address]  [place] [address]        Dear Colleague,    Thank you for referring your patient, Jenn Aparicio, to the [facility] FOR LUNG SCIENCE AND [facility] [place]. Please see a copy of my visit note below.    [facility]   Pulmonary Clinic  Consult Note 8/10/2022  Jenn Aparicio MRN: [id number]      Assessment & Plan     Jenn Aparicio is a 68 year old female with very severe COPD (FEV1 0.55, Z-4.42 4/2023), RLL adeno s/p lobectomy (2016), RUL NSCLC s/p SBRT (1/2020), ongoing tobacco use, DM2, and HTN who initially established with pulmonary clinic 12/2020 after transferring her oncology care to [facility].  She was last seen 8/2022.  Since that time, she was hospitalized 1/1-1/4/2023, 2/3-2/9/2023, and 2/20-2/24/2023 for severe COPD exacerbations without any overt triggers.    #Severe COPD (FEV1 0.55, Z-4.42 4/2023)  #Chronic hypoxemic hypercapnic respiratory failure (4L O2, pVCO2 55-60 baseline)  Last exacerbation requiring steroids was 4/2023.  Last exacerbation requiring hospitalization was 2/2023.  Did not get roflumilast, so will try sending it to her local pharmacy instead.  Due to her frequent hospitalizations last winter, will try to get her medically maximized before respiratory season.  She should remain on NIPPV, restarting it as soon as is safe from a GI standpoint.    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  Plan for follow up imaging to monitor enlarging nodules.  Only therapy that would be offered would be systemic, if needed.  EBUS from 7/19/2023 without positive lymph nodes.    Recommendations:    - Patient to discuss cardiac work up with PCNP   - If cleared from cardiac standpoint, would recommend pulmonary rehab   - Continue Trelegy (LAMA/LABA/ICS)   - Secretion management when needed:    - Vest therapy BID PRN    - Mucomyst neb BID PRN   - Duonebs  QID PRN   - Nightly NIPPV, once cleared from GI standpoint   - Start roflumilast 250 mg daily x1 month followed by 500 mg daily ongoing.  Prescription send to alternative pharmacy.   - May need addition of chronic prednisone this winter to help manage exacerbations, if exacerbations recur   - COPD home action plan active: prednisone + doxycycline on hand   - RTC 4 months    Patient seen & discussed w/  Dr. Carreno, who agrees with the above assessment and plan.    Kailee Moralez M.D.  Pulmonary and Critical Care Medicine Fellow  08/09/2023       Attending note:  Patient seen, examined and discussed with Dr. Moralez. All data reviewed. Agree with the assessment and plan as outlined in the above note.  We discussed starting pulmonary rehab after clearance from Cardiology.  Still with plans to start roflumilast, overcoming pharmacy issues.    Lorie Carreno MD  [phone]           Interval History / Subjective:   Jenn Aparicio is a 68 year old female with severe COPD (FEV1 0.55, Z-4.42 4/2023), RLL adeno s/p lobectomy (2016), RUL NSCLC s/p SBRT (1/2020), ongoing tobacco use, DM2, and HTN who established with pulmonary clinic 12/2020 after transferring her oncology care to [facility].  She was last seen 4/2023 after being hospitalized 1/1-1/4/2023, 2/3-2/9/2023, and 2/20-2/24/2023 for severe COPD exacerbations without any overt triggers.  She was treated for an exacerbation and prescribed roflumilast at that time.  She returns today for what should have been a 3-month follow up.    Since her last visit, she underwent EGD dilation, EBUS 7/19 for oncologic evaluation (which was negative), and treated with 7 days of levofloxacin for strep pneumonia which was found on the bronch.  She finished that course of antibiotics and reports having no additional antibiotics since I last saw her.  She also has not had any additional prednisone.  She has several upcoming doctors appointments including cataract surgery and repeat esophageal  dilation.    She has about 1 month of worsening shortness of breath and leg cramps/weakness and chest tightness with activity, but denies wheezing, coughing, and sputum.  She has some leg swelling that comes and goes.  Her dyspnea, chest pain, and leg pains resolve with rest.    She reports having never received the roflumilast medication.  She is still using her NIPPV nearly every night, but has been holding it as of late due to her dysphagia and vomiting, giving it time to heal.  She hasn't noticed a difference in symptoms with holding it, but is planning on restarting it back soon.          Social/Occupational/Exposures:     Patient is a off-and-on smoker w/ significant second hand exposure in her apartment building.  No known asbestos exposure.  No pets.  No bird exposure.  Significant mold reported in her apartment building.    Social History     Tobacco Use    Smoking status: Former     Packs/day: 0.25     Types: Cigarettes    Smokeless tobacco: Never    Tobacco comments:     01/02/2023 Patient using 14 mg patch, wants to have prescription for Nicotrol inhaler, took workbook   Substance Use Topics    Alcohol use: Not Currently    Drug use: No             Review of Symptoms:   10-point ROS reviewed, & found negative w/ exceptions noted in the HPI.          Past Medical History:     Past Medical History:   Diagnosis Date    Adenocarcinoma, lung (H)     Asthma     Ectopic pregnancy     Esophageal reflux     Pulmonary emphysema (H)     Very severe FEV1<30% predicted    Type II diabetes mellitus (H)        Past Surgical History:   Procedure Laterality Date    2/8/16                R thoracotomy, RLL lobectomy (Dr. Cunha). Adenocarcinoma, 1.1 cm, assoicated with atypical adenomatous hyperplasia Right 02/08/2016    R thoracotomy, RLL lobectomy (Dr. Cunha). Adenocarcinoma, 1.1 cm, assoicated with atypical adenomatous hyperplasia    BRONCHOSCOPY, WITH BIOPSY, ROBOT ASSISTED N/A 7/19/2023    Procedure: robot  assisted Ion BRONCHOSCOPY, WITH BIOPSY;  Surgeon: Patria Garcia MD;  Location: [facility] OR    ENDOBRONCHIAL ULTRASOUND FLEXIBLE N/A 7/19/2023    Procedure: Endobronchial ultrasound flexible;  Surgeon: Patria Garcia MD;  Location:  OR    ESOPHAGOSCOPY, GASTROSCOPY, DUODENOSCOPY (EGD), COMBINED N/A 8/16/2022    Procedure: ESOPHAGOGASTRODUODENOSCOPY (EGD);  Surgeon: Travis Briseno MD;  Location:  GI    ESOPHAGOSCOPY, GASTROSCOPY, DUODENOSCOPY (EGD), COMBINED N/A 8/8/2023    Procedure: ESOPHAGOGASTRODUODENOSCOPY, WITH BIOPSY;  Surgeon: Chao Rodrigues DO;  Location:  GI    ORTHOPEDIC SURGERY              Allergies:     Allergies   Allergen Reactions    Bee Venom Anaphylaxis    Interferons Dermatitis    Penicillins Hives    Aspirin      325mg     Colon Care              Outpatient Medications:     acetylcysteine (MUCOMYST) 10 % nebulizer solution, Inhale 4 mLs into the lungs 4 times daily (Patient taking differently: Inhale 4 mLs into the lungs as needed)  albuterol (PROAIR HFA/PROVENTIL HFA/VENTOLIN HFA) 108 (90 Base) MCG/ACT inhaler, USE 1 OR 2 INHALATIONS EVERY 4 HOURS AS NEEDED (Patient taking differently: Inhale 1 puff into the lungs daily USE 1 OR 2 INHALATIONS EVERY 4 HOURS AS NEEDED)  albuterol (PROVENTIL) (2.5 MG/3ML) 0.083% neb solution, Take 1 vial (2.5 mg) by nebulization 4 times daily  Alpha Lipoic Acid 200 MG CAPS, Take 1 capsule by mouth daily (Patient taking differently: Take 1 capsule by mouth every morning)  amLODIPine (NORVASC) 10 MG tablet, Take 1 tablet (10 mg) by mouth daily  ASPIRIN LOW DOSE 81 MG EC tablet, TAKE 1 TABLET DAILY (Patient taking differently: Take 81 mg by mouth every morning)  buPROPion (WELLBUTRIN XL) 300 MG 24 hr tablet, Take 1 tablet (300 mg) by mouth every morning  Continuous Blood Gluc Sensor (FREESTYLE MARTI 2 SENSOR) [facility], 1 each every 14 days For use with Freestyle Marti 2  for continuous monitioring of blood glucose levels. Replace sensor every 14  days.  cyanocobalamin (VITAMIN B-12) 500 MCG tablet, Take 1 tablet (500 mcg) by mouth daily (Patient taking differently: Take 500 mcg by mouth every morning)  doxycycline hyclate (VIBRA-TABS) 100 MG tablet, Take 1 tablet (100 mg) by mouth 2 times daily (Patient taking differently: Take 100 mg by mouth every morning)  empagliflozin (JARDIANCE) 25 MG TABS tablet, Take 1 tablet (25 mg) by mouth daily (Patient taking differently: Take 25 mg by mouth every morning)  EPINEPHrine (ANY BX GENERIC EQUIV) 0.3 MG/0.3ML injection 2-pack, Inject 0.3 mLs (0.3 mg) into the muscle as needed for anaphylaxis (related to bee stings) May repeat one time in 5-15 minutes if response to initial dose is inadequate.  Fluticasone-Umeclidin-Vilanterol (TRELEGY ELLIPTA) 200-62.5-25 MCG/INH oral inhaler, Inhale 1 puff into the lungs daily (Patient taking differently: Inhale 1 puff into the lungs every morning)  GLUCOSAMINE-CHONDROITIN -400 MG tablet, TAKE 1 TABLET DAILY (Patient taking differently: Take 1 tablet by mouth every evening)  insulin pen needle (BD MARCIO U/F) 32G X 4 MM miscellaneous, Use 1 pen needle daily or as directed.  ketorolac (ACULAR) 0.5 % ophthalmic solution, Place 1 drop Into the left eye 4 times daily (Patient taking differently: Place 1 drop Into the left eye 2 times daily)  ketotifen (ZADITOR) 0.025 % ophthalmic solution, Place 1 drop into both eyes 2 times daily  moxifloxacin (VIGAMOX) 0.5 % ophthalmic solution, Place 1 drop Into the left eye 4 times daily  multivitamin w/minerals (THERA-VIT-M) tablet, Take 1 tablet by mouth daily (Patient taking differently: Take 1 tablet by mouth every morning)  nicotine (NICODERM CQ) 14 MG/24HR 24 hr patch, Place 1 patch onto the skin every 24 hours  nicotine (NICORETTE) 4 MG lozenge, Place 1 lozenge (4 mg) inside cheek every hour as needed for smoking cessation  nicotine (NICOTROL) 10 MG inhaler, Use 1 cartridge as needed for urge to smoke by puffing over course of 20min.  Use  6-16 cart/day; reduce number of cart/day over 6-12 weeks.  olopatadine (PATADAY) 0.2 % ophthalmic solution, Place 0.05 mLs (1 drop) into both eyes daily  omega-3 acid ethyl esters (LOVAZA) 1 g capsule, Take 2 capsules (2 g) by mouth 2 times daily (Patient taking differently: Take 1 g by mouth 2 times daily Take 2 capsules (2 g) by mouth 2 times daily)  omeprazole (PRILOSEC) 20 MG DR capsule, TAKE 1 CAPSULE TWICE A DAY  polyethylene glycol-propylene glycol (SYSTANE) 0.4-0.3 % SOLN ophthalmic solution, Place 1 drop into both eyes 4 times daily  polyethylene glycol-propylene glycol (SYSTANE) 0.4-0.3 % SOLN ophthalmic solution, Place 1 drop into both eyes 4 times daily (Patient taking differently: Place 1 drop into both eyes 3 times daily)  prednisoLONE acetate (PRED FORTE) 1 % ophthalmic suspension, Place 1 drop Into the left eye 4 times daily  predniSONE (DELTASONE) 20 MG tablet, Take 2 tablets (40 mg) by mouth daily  pregabalin (LYRICA) 150 MG capsule, Take 1 capsule (150 mg) by mouth 2 times daily  semaglutide (OZEMPIC, 0.25 OR 0.5 MG/DOSE,) 2 MG/3ML pen, Inject 0.5 mg Subcutaneous once a week  STATIN NOT PRESCRIBED (INTENTIONAL), Please choose reason not prescribed from choices below.    No current facility-administered medications on file prior to visit.            Family History:     Family History   Problem Relation Age of Onset    Thyroid Disease Mother     Cerebrovascular Disease Mother     Hypertension Mother     Hypertension Father     Glaucoma Father     Cancer Sister     Lung Cancer Sister     Diabetes Brother     Cancer Brother     Diabetes Brother     Cancer Brother     Diabetes Brother     Deep Vein Thrombosis (DVT) Daughter     Depression Daughter     Alcohol/Drug Other         self    Diabetes Other         self    Thyroid Disease Other         self    Asthma Other         self    Macular Degeneration No family hx of     Anesthesia Reaction No family hx of                Physical Exam:   /71    Pulse 101   SpO2 95%     General: middle aged female, using a walker, appears stated age  HENT: external ears without visible abnormalities, no rhinorrhea, no epistaxis, poor dentition  Lungs: decent air movement in all lung fields, no wheezing, on NC 0-3L during appointment, no accessory muscle use  Heart: RRR, no murmurs  Extremities: trace bilateral pitting edema, bilateral clubbing, no cyanosis  Skin: no visible rashes, no mottling, scattered ecchymoses in various stages of healing  Neurologic: moving all 4 extremities spontaneously, awake and alert  Psych: full affect, appropriate insight, appropriate judgment          Data:   Labs: notable labs in HPI above.    CO2 (4/17/23): 36  Alpha-1 Antitrypsin (2/22/2023): 149, normal M1M1 phenotype    Imaging and other diagnostic testing (all imaging studies reviewed by me)    CT chest 3/22/2023: RUL nodules stable compared to 3/1/2023; ROBERT mucus plugging    PFTs 12/2020:        PFTs 4/2023: FEV1 0.55 (-4.42), FVC 1.36 (-3.64), 0.40 -> severe obstruction, stable compared to 12/2020    6MWT 8/10/2022: 540 feet, 97% -> 90% on room air (previous walk in 12/2020 for 480 feet)    Transthoracic echocardiogram (9/2022): EF 55-60%, normal RV, cannot assess PASP, normal RA pressure      Again, thank you for allowing me to participate in the care of your patient.        Sincerely,        Kailee Moralez MD

## 2023-08-09 NOTE — PATIENT INSTRUCTIONS
Call your primary care NP to get your heart checked out.  It sounds like you need a stress test.  I will send that COPD medication to your [facility] pharmacy instead of the mail order.  If you don't get it, please call us.  Keep up the great work!  I will plan on seeing you in a few months.    Kailee Moralez MD  Pulmonary / Critical Care Fellow

## 2023-08-09 NOTE — PROGRESS NOTES
[facility]   Pulmonary Clinic  Consult Note 8/10/2022  Jenn Aparicio MRN: [id number]      Assessment & Plan      Jenn Aparicio is a 68 year old female with very severe COPD (FEV1 0.55, Z-4.42 4/2023), RLL adeno s/p lobectomy (2016), RUL NSCLC s/p SBRT (1/2020), ongoing tobacco use, DM2, and HTN who initially established with pulmonary clinic 12/2020 after transferring her oncology care to [facility].  She was last seen 8/2022.  Since that time, she was hospitalized 1/1-1/4/2023, 2/3-2/9/2023, and 2/20-2/24/2023 for severe COPD exacerbations without any overt triggers.    #Severe COPD (FEV1 0.55, Z-4.42 4/2023)  #Chronic hypoxemic hypercapnic respiratory failure (4L O2, pVCO2 55-60 baseline)  Last exacerbation requiring steroids was 4/2023.  Last exacerbation requiring hospitalization was 2/2023.  Did not get roflumilast, so will try sending it to her local pharmacy instead.  Due to her frequent hospitalizations last winter, will try to get her medically maximized before respiratory season.  She should remain on NIPPV, restarting it as soon as is safe from a GI standpoint.    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  Plan for follow up imaging to monitor enlarging nodules.  Only therapy that would be offered would be systemic, if needed.  EBUS from 7/19/2023 without positive lymph nodes.    Recommendations:    - Patient to discuss cardiac work up with PCNP   - If cleared from cardiac standpoint, would recommend pulmonary rehab   - Continue Trelegy (LAMA/LABA/ICS)   - Secretion management when needed:    - Vest therapy BID PRN    - Mucomyst neb BID PRN   - Duonebs QID PRN   - Nightly NIPPV, once cleared from GI standpoint   - Start roflumilast 250 mg daily x1 month followed by 500 mg daily ongoing.  Prescription send to alternative pharmacy.   - May need addition of chronic prednisone this winter to help manage exacerbations, if exacerbations  recur   - COPD home action plan active: prednisone + doxycycline on hand   - RTC 4 months    Patient seen & discussed w/  Dr. Carreno, who agrees with the above assessment and plan.    Kailee Moralez M.D.  Pulmonary and Critical Care Medicine Fellow  08/09/2023       Attending note:  Patient seen, examined and discussed with Dr. Moralez. All data reviewed. Agree with the assessment and plan as outlined in the above note.  We discussed starting pulmonary rehab after clearance from Cardiology.  Still with plans to start roflumilast, overcoming pharmacy issues.    Lorie Carreno MD  [phone]           Interval History / Subjective:   Jenn Aparicio is a 68 year old female with severe COPD (FEV1 0.55, Z-4.42 4/2023), RLL adeno s/p lobectomy (2016), RUL NSCLC s/p SBRT (1/2020), ongoing tobacco use, DM2, and HTN who established with pulmonary clinic 12/2020 after transferring her oncology care to [facility].  She was last seen 4/2023 after being hospitalized 1/1-1/4/2023, 2/3-2/9/2023, and 2/20-2/24/2023 for severe COPD exacerbations without any overt triggers.  She was treated for an exacerbation and prescribed roflumilast at that time.  She returns today for what should have been a 3-month follow up.    Since her last visit, she underwent EGD dilation, EBUS 7/19 for oncologic evaluation (which was negative), and treated with 7 days of levofloxacin for strep pneumonia which was found on the bronch.  She finished that course of antibiotics and reports having no additional antibiotics since I last saw her.  She also has not had any additional prednisone.  She has several upcoming doctors appointments including cataract surgery and repeat esophageal dilation.    She has about 1 month of worsening shortness of breath and leg cramps/weakness and chest tightness with activity, but denies wheezing, coughing, and sputum.  She has some leg swelling that comes and goes.  Her dyspnea, chest pain, and leg pains resolve with rest.    She  reports having never received the roflumilast medication.  She is still using her NIPPV nearly every night, but has been holding it as of late due to her dysphagia and vomiting, giving it time to heal.  She hasn't noticed a difference in symptoms with holding it, but is planning on restarting it back soon.          Social/Occupational/Exposures:     Patient is a off-and-on smoker w/ significant second hand exposure in her apartment building.  No known asbestos exposure.  No pets.  No bird exposure.  Significant mold reported in her apartment building.    Social History     Tobacco Use    Smoking status: Former     Packs/day: 0.25     Types: Cigarettes    Smokeless tobacco: Never    Tobacco comments:     01/02/2023 Patient using 14 mg patch, wants to have prescription for Nicotrol inhaler, took workbook   Substance Use Topics    Alcohol use: Not Currently    Drug use: No             Review of Symptoms:   10-point ROS reviewed, & found negative w/ exceptions noted in the HPI.          Past Medical History:     Past Medical History:   Diagnosis Date    Adenocarcinoma, lung (H)     Asthma     Ectopic pregnancy     Esophageal reflux     Pulmonary emphysema (H)     Very severe FEV1<30% predicted    Type II diabetes mellitus (H)        Past Surgical History:   Procedure Laterality Date    2/8/16                R thoracotomy, RLL lobectomy (Dr. Cunha). Adenocarcinoma, 1.1 cm, assoicated with atypical adenomatous hyperplasia Right 02/08/2016    R thoracotomy, RLL lobectomy (Dr. Cunha). Adenocarcinoma, 1.1 cm, assoicated with atypical adenomatous hyperplasia    BRONCHOSCOPY, WITH BIOPSY, ROBOT ASSISTED N/A 7/19/2023    Procedure: robot assisted Ion BRONCHOSCOPY, WITH BIOPSY;  Surgeon: Patria Garcia MD;  Location: [facility] OR    ENDOBRONCHIAL ULTRASOUND FLEXIBLE N/A 7/19/2023    Procedure: Endobronchial ultrasound flexible;  Surgeon: Patria Garcia MD;  Location: [facility] OR    ESOPHAGOSCOPY, GASTROSCOPY, DUODENOSCOPY (EGD),  COMBINED N/A 8/16/2022    Procedure: ESOPHAGOGASTRODUODENOSCOPY (EGD);  Surgeon: Travis Briseno MD;  Location:  GI    ESOPHAGOSCOPY, GASTROSCOPY, DUODENOSCOPY (EGD), COMBINED N/A 8/8/2023    Procedure: ESOPHAGOGASTRODUODENOSCOPY, WITH BIOPSY;  Surgeon: Chao Rodrigues DO;  Location:  GI    ORTHOPEDIC SURGERY              Allergies:     Allergies   Allergen Reactions    Bee Venom Anaphylaxis    Interferons Dermatitis    Penicillins Hives    Aspirin      325mg     Colon Care              Outpatient Medications:     acetylcysteine (MUCOMYST) 10 % nebulizer solution, Inhale 4 mLs into the lungs 4 times daily (Patient taking differently: Inhale 4 mLs into the lungs as needed)  albuterol (PROAIR HFA/PROVENTIL HFA/VENTOLIN HFA) 108 (90 Base) MCG/ACT inhaler, USE 1 OR 2 INHALATIONS EVERY 4 HOURS AS NEEDED (Patient taking differently: Inhale 1 puff into the lungs daily USE 1 OR 2 INHALATIONS EVERY 4 HOURS AS NEEDED)  albuterol (PROVENTIL) (2.5 MG/3ML) 0.083% neb solution, Take 1 vial (2.5 mg) by nebulization 4 times daily  Alpha Lipoic Acid 200 MG CAPS, Take 1 capsule by mouth daily (Patient taking differently: Take 1 capsule by mouth every morning)  amLODIPine (NORVASC) 10 MG tablet, Take 1 tablet (10 mg) by mouth daily  ASPIRIN LOW DOSE 81 MG EC tablet, TAKE 1 TABLET DAILY (Patient taking differently: Take 81 mg by mouth every morning)  buPROPion (WELLBUTRIN XL) 300 MG 24 hr tablet, Take 1 tablet (300 mg) by mouth every morning  Continuous Blood Gluc Sensor (FREESTYLE GIOVANNI 2 SENSOR) [facility], 1 each every 14 days For use with Freestyle Giovanni 2  for continuous monitioring of blood glucose levels. Replace sensor every 14 days.  cyanocobalamin (VITAMIN B-12) 500 MCG tablet, Take 1 tablet (500 mcg) by mouth daily (Patient taking differently: Take 500 mcg by mouth every morning)  doxycycline hyclate (VIBRA-TABS) 100 MG tablet, Take 1 tablet (100 mg) by mouth 2 times daily (Patient taking differently: Take 100 mg by  mouth every morning)  empagliflozin (JARDIANCE) 25 MG TABS tablet, Take 1 tablet (25 mg) by mouth daily (Patient taking differently: Take 25 mg by mouth every morning)  EPINEPHrine (ANY BX GENERIC EQUIV) 0.3 MG/0.3ML injection 2-pack, Inject 0.3 mLs (0.3 mg) into the muscle as needed for anaphylaxis (related to bee stings) May repeat one time in 5-15 minutes if response to initial dose is inadequate.  Fluticasone-Umeclidin-Vilanterol (TRELEGY ELLIPTA) 200-62.5-25 MCG/INH oral inhaler, Inhale 1 puff into the lungs daily (Patient taking differently: Inhale 1 puff into the lungs every morning)  GLUCOSAMINE-CHONDROITIN -400 MG tablet, TAKE 1 TABLET DAILY (Patient taking differently: Take 1 tablet by mouth every evening)  insulin pen needle (BD MARCIO U/F) 32G X 4 MM miscellaneous, Use 1 pen needle daily or as directed.  ketorolac (ACULAR) 0.5 % ophthalmic solution, Place 1 drop Into the left eye 4 times daily (Patient taking differently: Place 1 drop Into the left eye 2 times daily)  ketotifen (ZADITOR) 0.025 % ophthalmic solution, Place 1 drop into both eyes 2 times daily  moxifloxacin (VIGAMOX) 0.5 % ophthalmic solution, Place 1 drop Into the left eye 4 times daily  multivitamin w/minerals (THERA-VIT-M) tablet, Take 1 tablet by mouth daily (Patient taking differently: Take 1 tablet by mouth every morning)  nicotine (NICODERM CQ) 14 MG/24HR 24 hr patch, Place 1 patch onto the skin every 24 hours  nicotine (NICORETTE) 4 MG lozenge, Place 1 lozenge (4 mg) inside cheek every hour as needed for smoking cessation  nicotine (NICOTROL) 10 MG inhaler, Use 1 cartridge as needed for urge to smoke by puffing over course of 20min.  Use 6-16 cart/day; reduce number of cart/day over 6-12 weeks.  olopatadine (PATADAY) 0.2 % ophthalmic solution, Place 0.05 mLs (1 drop) into both eyes daily  omega-3 acid ethyl esters (LOVAZA) 1 g capsule, Take 2 capsules (2 g) by mouth 2 times daily (Patient taking differently: Take 1 g by mouth 2  times daily Take 2 capsules (2 g) by mouth 2 times daily)  omeprazole (PRILOSEC) 20 MG DR capsule, TAKE 1 CAPSULE TWICE A DAY  polyethylene glycol-propylene glycol (SYSTANE) 0.4-0.3 % SOLN ophthalmic solution, Place 1 drop into both eyes 4 times daily  polyethylene glycol-propylene glycol (SYSTANE) 0.4-0.3 % SOLN ophthalmic solution, Place 1 drop into both eyes 4 times daily (Patient taking differently: Place 1 drop into both eyes 3 times daily)  prednisoLONE acetate (PRED FORTE) 1 % ophthalmic suspension, Place 1 drop Into the left eye 4 times daily  predniSONE (DELTASONE) 20 MG tablet, Take 2 tablets (40 mg) by mouth daily  pregabalin (LYRICA) 150 MG capsule, Take 1 capsule (150 mg) by mouth 2 times daily  semaglutide (OZEMPIC, 0.25 OR 0.5 MG/DOSE,) 2 MG/3ML pen, Inject 0.5 mg Subcutaneous once a week  STATIN NOT PRESCRIBED (INTENTIONAL), Please choose reason not prescribed from choices below.    No current facility-administered medications on file prior to visit.            Family History:     Family History   Problem Relation Age of Onset    Thyroid Disease Mother     Cerebrovascular Disease Mother     Hypertension Mother     Hypertension Father     Glaucoma Father     Cancer Sister     Lung Cancer Sister     Diabetes Brother     Cancer Brother     Diabetes Brother     Cancer Brother     Diabetes Brother     Deep Vein Thrombosis (DVT) Daughter     Depression Daughter     Alcohol/Drug Other         self    Diabetes Other         self    Thyroid Disease Other         self    Asthma Other         self    Macular Degeneration No family hx of     Anesthesia Reaction No family hx of                Physical Exam:   /71   Pulse 101   SpO2 95%     General: middle aged female, using a walker, appears stated age  HENT: external ears without visible abnormalities, no rhinorrhea, no epistaxis, poor dentition  Lungs: decent air movement in all lung fields, no wheezing, on NC 0-3L during appointment, no accessory  muscle use  Heart: RRR, no murmurs  Extremities: trace bilateral pitting edema, bilateral clubbing, no cyanosis  Skin: no visible rashes, no mottling, scattered ecchymoses in various stages of healing  Neurologic: moving all 4 extremities spontaneously, awake and alert  Psych: full affect, appropriate insight, appropriate judgment          Data:   Labs: notable labs in HPI above.    CO2 (4/17/23): 36  Alpha-1 Antitrypsin (2/22/2023): 149, normal M1M1 phenotype    Imaging and other diagnostic testing (all imaging studies reviewed by me)    CT chest 3/22/2023: RUL nodules stable compared to 3/1/2023; ROBERT mucus plugging    PFTs 12/2020:        PFTs 4/2023: FEV1 0.55 (-4.42), FVC 1.36 (-3.64), 0.40 -> severe obstruction, stable compared to 12/2020    6MWT 8/10/2022: 540 feet, 97% -> 90% on room air (previous walk in 12/2020 for 480 feet)    Transthoracic echocardiogram (9/2022): EF 55-60%, normal RV, cannot assess PASP, normal RA pressure

## 2023-08-09 NOTE — NURSING NOTE
Chief Complaint   Patient presents with    Follow Up     3 month follow up      Vitals were taken and medications were reconciled.     Eloise Duarte RMA  4:19 PM

## 2023-08-10 ENCOUNTER — TELEPHONE (OUTPATIENT)
Dept: PULMONOLOGY | Facility: CLINIC | Age: 68
End: 2023-08-10
Payer: COMMERCIAL

## 2023-08-10 NOTE — TELEPHONE ENCOUNTER
TRELEGY ELLIPTA INH POWDER 200/62.5/25MCG      Last Written Prescription Date:  6/1/22  Last Fill Quantity: 28,   # refills: 5  Last Office Visit : 6/29/23  Future Office visit:  none    Routing refill request to provider for review/approval because:  Drug not on the [facility], P or [facility] refill protocol or controlled substance

## 2023-08-10 NOTE — TELEPHONE ENCOUNTER
Prior Authorization Retail Medication Request    Medication/Dose: Roflumilast 250mcg tabs  ICD code (if different than what is on RX):    Previously Tried and Failed:    Rationale:      Insurance Name:    Insurance ID:        Pharmacy Information (if different than what is on RX)  Name:    Phone:

## 2023-08-11 RX ORDER — FLUTICASONE FUROATE, UMECLIDINIUM BROMIDE AND VILANTEROL TRIFENATATE 200; 62.5; 25 UG/1; UG/1; UG/1
POWDER RESPIRATORY (INHALATION)
Qty: 28 EACH | Refills: 5 | Status: SHIPPED | OUTPATIENT
Start: 2023-08-11 | End: 2023-10-26

## 2023-08-16 LAB — BACTERIA FLD CULT: NO GROWTH

## 2023-08-16 NOTE — TELEPHONE ENCOUNTER
Prior Authorization Not Needed per Insurance        Medication: Roflumilast 250mcg tabs-PA NOT NEEDED   Insurance Company: [employer] - Phone [phone] Fax [phone]  Expected CoPay:      Pharmacy Filling the Rx: [id number] - [place], [place] - [address] AT [facility] OF  [address]  Pharmacy Notified: Yes  Patient Notified: No      Insurance states that PA is Not Needed and medication is covered. Called pharmacy and pharmacy stated that PA is Not Needed and medication is covered. **Instructed pharmacy to notify patient when script is ready to /ship.** Pharmacy stated that they are able to process medication and will notify the patient when medication is ready for .

## 2023-08-17 DIAGNOSIS — E11.42 DIABETIC PERIPHERAL NEUROPATHY (H): ICD-10-CM

## 2023-08-17 DIAGNOSIS — E11.65 TYPE 2 DIABETES MELLITUS WITH HYPERGLYCEMIA, WITHOUT LONG-TERM CURRENT USE OF INSULIN (H): ICD-10-CM

## 2023-08-18 ENCOUNTER — TELEPHONE (OUTPATIENT)
Dept: INTERNAL MEDICINE | Facility: CLINIC | Age: 68
End: 2023-08-18
Payer: COMMERCIAL

## 2023-08-18 RX ORDER — PREGABALIN 150 MG/1
CAPSULE ORAL
Qty: 90 CAPSULE | Refills: 1 | Status: SHIPPED | OUTPATIENT
Start: 2023-08-18 | End: 2023-10-05

## 2023-08-18 NOTE — TELEPHONE ENCOUNTER
pregabalin (LYRICA) 150 MG capsule       60 capsule 3 3/13/2023     6/29/2023  [facility] Internal Medicine [place]    Mitzi Nettles APRN [facility]  Internal Medicine    Routed because: controlled

## 2023-08-18 NOTE — TELEPHONE ENCOUNTER
[facility] Call Center    Phone Message    May a detailed message be left on voicemail: yes     Reason for Call: patient calling stating she is having cataract of both eyes soon and wants to know if an addendum can be made to her last pre op so she doesn't have to have that done again. Please call thank you.     Action Taken: Message routed to:  [facility]    Travel Screening: Not Applicable

## 2023-08-18 NOTE — TELEPHONE ENCOUNTER
Patient saw pcp Mitzi on 06/29/23 for DME orders only.  Pt was not seen for a pre-op exam, so no addendum can be made for something the provider did not evaluate and assess pt for.  Pt will need to schedule a pre-op exam.      Brodie Negron CMA ([facility]) at 1:00 PM on 8/18/2023

## 2023-08-22 NOTE — PROGRESS NOTES
[facility] [address]  [address] [facility] [address]  Phone: [phone]  Fax: [phone]  Primary Provider: Mitzi Nettles  Pre-op Performing Provider: BISHOP CAMARENA      PREOPERATIVE EVALUATION:  Today's date: 8/23/2023    Jenn Aparicio is a 68 year old female who presents for a preoperative evaluation.    Surgical Information:  Surgery/Procedure: LEFT EYE AND RIGHT EYE PHACOEMULSIFICATION, CATARACT, WITH INTRAOCULAR LENS IMPLANT    [place] [facility] [place]  Surgeon: Re Keita MD   Surgery Date: 8/24 LEFT EYE and 9/5 RIGHT EYE   Time of Surgery: 07:15 AM AND 01:10 PM  Where patient plans to recover: At home with family  Fax number for surgical facility: Note does not need to be faxed, will be available electronically in Epic.    Assessment & Plan     The proposed surgical procedure is considered LOW risk.    Type 2 diabetes mellitus without complication, without long-term current use of insulin (H)  Better controlled.  A1c is improved.  She is to hold Jardiance on today and day of surgery.  She is also was to hold Ozempic this week.  - Hemoglobin A1c    Primary hypertension  Blood pressure today is within target she should take her blood pressure medicine the morning of surgery  - Basic metabolic panel  (Ca, Cl, CO2, Creat, Gluc, K, Na, BUN)    Preop general physical exam  She should use oxygen during the procedure.  She should continue with her inhalers.  Her cardiovascular perioperative risk is low and her pulmonary perioperative risk is low to moderate.    Chronic obstructive pulmonary disease, unspecified COPD type (H)  She is oxygen dependent.  She is using inhalers.  She does have a history of longstanding smoking.    Primary adenocarcinoma of lung, unspecified laterality (H)  She did have surgical resection.  She is doing reasonably well.    Combined forms of age-related cataract of both eyes  She has moderate to severe cataracts.   This will be helpful to her.        Possible Sleep Apnea: pt using c-pap -  {        - No identified additional risk factors other than previously addressed    Antiplatelet or Anticoagulation Medication Instructions:   - Bleeding risk is low for this procedure (e.g. dental, skin, cataract).    Additional Medication Instructions:  Patient is to take only blood pressure scheduled medications on the day of surgery. She was told to hold the juaniva 8/23 and 8/24 and hold ozempic this week.     RECOMMENDATION:  Pt has low cardiovascular perioperative risk and low to moderate pulmonary perioperative risk for this low risk surgery.  She should have oxygen during the procedure.  She should take her BP meds in the morning of surgery.      Time note (e5, 40'): The total of my time (on the date of service) for this service was 50 minutes, including discussion/face-to-face, chart review, interpretation not otherwise reported, documentation, and updating of the computerized record.    [id number]}    Subjective       HPI related to upcoming procedure: She is 67 yo female with bilateral cataracts. Pt has poor vision - blurry. Needs increase light to see.  No glaucoma - She is diabetic  and has mild retinopathy.               Health Care Directive:  Patient has a Health Care Directive on file    What is your level of physical activity? is on 3 liters O2 - limited  mobility   Do you have chest pain when you re physically active? No  Do you currently have a cold, bronchitis or symptoms of other respiratory (head and chest) infections? No  Do you have a cough, shortness of breath, or wheezing? Yes has COPD and asthma - on 3liters  nasal oxygen      Have you ever had anemia or been told to take iron pills? Yes many years ago with pregnancy   Have you had any abnormal blood loss such as black, tarry or bloody stools, or abnormal vaginal bleeding? No  Have you ever had a blood transfusion? Yes   Are you willing to have a blood transfusion if  it is medically needed before, during or after your surgery? Yes  Have you ever had a heart attack or stroke? No  Have you ever had surgery on your heart or blood vessels, such as a stent, coronary (heart) bypass, or surgery on an artery in the head, neck, heart or legs? No  Do you have a history of heart failure? No  Do you have sleep apnea, excessive snoring or daytime drowsiness? Yes - using c-pap   Do you or anyone in your family have a history of blood clots? daughter has hx of blood clots after pregnancy   Do you or anyone in your family have a serious bleeding problem, such as longlasting bleeding after surgeries or cuts? No  Have you or anyone in your family ever had problems with anesthesia (sedation for surgery)? No     Do you have any artificial heart valves or other implanted medical devices, such as a pacemaker, defibrillator or continuous glucose monitor? No  Do you have any artificial joints? No   Are you allergic to latex? Yes - rash   Is there any chance that you may be pregnant? No      Preoperative Review of :  Narx Scores  Narcotic 110; sedative 250;stimulant 000   [id number]}    Status of Chronic Conditions:  ASTHMA - Patient has a longstanding history of moderate-severe Asthma . Patient having problems with heat and humidity - is using inhalers and on nasal oxygen  - cough intermittent with clear phlegm      COPD - Patient has a longstanding history of moderate-severe COPD . Hx of lung cancer and surgery right lung.  Sees oncology - get CT scans  - last time 7/2023 - had biopsy and was negative.  As above using inhalers - has intermittent cough    DIABETES - Patient has a longstanding history of DiabetesType Type II . Patient is being treated with ozempic  and jardiance.  She  was told to hold  jardiance day before and hold ozempic this wk.  Last AiC  was high. On low dose ASA . Has continuous glucose monitor but told no metal so did not wear it last few days.      HYPERTENSION - Patient has  longstanding history of HTN , has had good control  currently denies any symptoms referable to elevated blood pressure. Specifically denies chest pain, palpitations or peripheral edema. Blood pressure readings have been in normal range. Current medication regimen is as listed below. Patient denies any side effects of medication.     MSK - having some discomfort left knee for last 1-2 days.  Lights went out yesterday  and couldn't see and hit a cabinet with left knee.      Review of Systems  See ROS questions pt answered at end of this note.      Patient Active Problem List    Diagnosis Date Noted    Combined forms of age-related cataract of both eyes 05/31/2023     Priority: Medium     Added automatically from request for surgery [id number]      Acute and chronic respiratory failure with hypoxia (H) 01/01/2023     Priority: Medium    Gastroesophageal reflux disease without esophagitis 05/24/2022     Priority: Medium    Esophageal dysphagia 05/24/2022     Priority: Medium    Shortness of breath 05/21/2022     Priority: Medium    Chest pain, unspecified type 05/21/2022     Priority: Medium    Hypokalemia 05/21/2022     Priority: Medium    Hypoxia 05/20/2022     Priority: Medium    Diabetic neuropathy (H) 11/18/2021     Priority: Medium    Malnutrition (H) 06/25/2021     Priority: Medium    Chronic obstructive pulmonary disease, unspecified COPD type (H) 02/23/2021     Priority: Medium    Primary lung adenocarcinoma (H) STAGE: IA2 [id number] poorly diff adeno RLL, then [id number] IA2 suspected NSCLC RUL, medically inoperable  12/15/2020     Priority: Medium    Pulmonary emphysema (H) 09/22/2020     Priority: Medium    Type 2 diabetes mellitus without complication, without long-term current use of insulin (H) 01/09/2019     Priority: Medium    Cervical stenosis (uterine cervix) 03/18/2011     Priority: Medium    Vaginitis 03/14/2011     Priority: Medium    Postmenopausal bleeding 03/14/2011     Priority: Medium      Past  Medical History:   Diagnosis Date    Adenocarcinoma, lung (H)     Asthma     Ectopic pregnancy     Esophageal reflux     Pulmonary emphysema (H)     Very severe FEV1<30% predicted    Type II diabetes mellitus (H)      Past Surgical History:   Procedure Laterality Date    2/8/16                R thoracotomy, RLL lobectomy (Dr. Cunha). Adenocarcinoma, 1.1 cm, assoicated with atypical adenomatous hyperplasia Right 02/08/2016    R thoracotomy, RLL lobectomy (Dr. Cunha). Adenocarcinoma, 1.1 cm, assoicated with atypical adenomatous hyperplasia    BRONCHOSCOPY, WITH BIOPSY, ROBOT ASSISTED N/A 7/19/2023    Procedure: robot assisted Ion BRONCHOSCOPY, WITH BIOPSY;  Surgeon: Patria Garcia MD;  Location: [facility]    ENDOBRONCHIAL ULTRASOUND FLEXIBLE N/A 7/19/2023    Procedure: Endobronchial ultrasound flexible;  Surgeon: Patria Garcia MD;  Location:  OR    ESOPHAGOSCOPY, GASTROSCOPY, DUODENOSCOPY (EGD), COMBINED N/A 8/16/2022    Procedure: ESOPHAGOGASTRODUODENOSCOPY (EGD);  Surgeon: Travis Briseno MD;  Location: [facility]    ESOPHAGOSCOPY, GASTROSCOPY, DUODENOSCOPY (EGD), COMBINED N/A 8/8/2023    Procedure: ESOPHAGOGASTRODUODENOSCOPY, WITH BIOPSY;  Surgeon: Chao Rodrigues DO;  Location:  GI    ORTHOPEDIC SURGERY       Current Outpatient Medications   Medication Sig Dispense Refill    acetylcysteine (MUCOMYST) 10 % nebulizer solution Inhale 4 mLs into the lungs 4 times daily (Patient taking differently: Inhale 4 mLs into the lungs as needed) 480 mL 0    albuterol (PROAIR HFA/PROVENTIL HFA/VENTOLIN HFA) 108 (90 Base) MCG/ACT inhaler USE 1 OR 2 INHALATIONS EVERY 4 HOURS AS NEEDED (Patient taking differently: Inhale 1 puff into the lungs daily USE 1 OR 2 INHALATIONS EVERY 4 HOURS AS NEEDED) 17 g 3    albuterol (PROVENTIL) (2.5 MG/3ML) 0.083% neb solution Take 1 vial (2.5 mg) by nebulization 4 times daily 360 mL 0    Alpha Lipoic Acid 200 MG CAPS Take 1 capsule by mouth daily (Patient taking differently: Take 1 capsule by mouth  every morning) 90 capsule 3    amLODIPine (NORVASC) 10 MG tablet Take 1 tablet (10 mg) by mouth daily 90 tablet 3    ASPIRIN LOW DOSE 81 MG EC tablet TAKE 1 TABLET DAILY (Patient taking differently: Take 81 mg by mouth every morning) 90 tablet 2    buPROPion (WELLBUTRIN XL) 300 MG 24 hr tablet Take 1 tablet (300 mg) by mouth every morning 90 tablet 3    cetirizine (ZYRTEC) 10 MG tablet Take 1 tablet (10 mg) by mouth every morning 30 tablet 11    Continuous Blood Gluc Sensor (FREESTYLE GIOVANNI 2 SENSOR) MISC 1 each every 14 days For use with Freestyle Giovanni 2  for continuous monitioring of blood glucose levels. Replace sensor every 14 days. 2 each 11    cyanocobalamin (VITAMIN B-12) 500 MCG tablet Take 1 tablet (500 mcg) by mouth daily (Patient taking differently: Take 500 mcg by mouth every morning) 90 tablet 1    doxycycline hyclate (VIBRA-TABS) 100 MG tablet Take 1 tablet (100 mg) by mouth 2 times daily (Patient taking differently: Take 100 mg by mouth every morning) 10 tablet 1    empagliflozin (JARDIANCE) 25 MG TABS tablet Take 1 tablet (25 mg) by mouth daily (Patient taking differently: Take 25 mg by mouth every morning) 90 tablet 1    EPINEPHrine (ANY BX GENERIC EQUIV) 0.3 MG/0.3ML injection 2-pack Inject 0.3 mLs (0.3 mg) into the muscle as needed for anaphylaxis (related to bee stings) May repeat one time in 5-15 minutes if response to initial dose is inadequate. 2 each 1    fluticasone (FLONASE) 50 MCG/ACT nasal spray Spray 1 spray into both nostrils 2 times daily Use at night before bed 15.8 mL 3    GLUCOSAMINE-CHONDROITIN -400 MG tablet TAKE 1 TABLET DAILY (Patient taking differently: Take 1 tablet by mouth every evening) 90 tablet 3    insulin pen needle (BD MARCIO U/F) 32G X 4 MM miscellaneous Use 1 pen needle daily or as directed. 100 each 1    ketorolac (ACULAR) 0.5 % ophthalmic solution Place 1 drop Into the left eye 4 times daily (Patient taking differently: Place 1 drop Into the left eye 2  times daily) 5 mL 0    ketotifen (ZADITOR) 0.025 % ophthalmic solution Place 1 drop into both eyes 2 times daily      moxifloxacin (VIGAMOX) 0.5 % ophthalmic solution Place 1 drop Into the left eye 4 times daily 3 mL 0    multivitamin w/minerals (THERA-VIT-M) tablet Take 1 tablet by mouth daily (Patient taking differently: Take 1 tablet by mouth every morning) 30 tablet 11    nicotine (NICODERM CQ) 14 MG/24HR 24 hr patch Place 1 patch onto the skin every 24 hours      nicotine (NICORETTE) 4 MG lozenge Place 1 lozenge (4 mg) inside cheek every hour as needed for smoking cessation 100 lozenge 0    nicotine (NICOTROL) 10 MG inhaler Use 1 cartridge as needed for urge to smoke by puffing over course of 20min.  Use 6-16 cart/day; reduce number of cart/day over 6-12 weeks. 160 each 0    olopatadine (PATADAY) 0.2 % ophthalmic solution Place 0.05 mLs (1 drop) into both eyes daily 2.5 mL 11    omega-3 acid ethyl esters (LOVAZA) 1 g capsule Take 2 capsules (2 g) by mouth 2 times daily (Patient taking differently: Take 1 g by mouth 2 times daily Take 2 capsules (2 g) by mouth 2 times daily) 360 capsule 3    omeprazole (PRILOSEC) 20 MG DR capsule TAKE 1 CAPSULE TWICE A  capsule 3    polyethylene glycol-propylene glycol (SYSTANE) 0.4-0.3 % SOLN ophthalmic solution Place 1 drop into both eyes 4 times daily 5 mL 11    polyethylene glycol-propylene glycol (SYSTANE) 0.4-0.3 % SOLN ophthalmic solution Place 1 drop into both eyes 4 times daily (Patient taking differently: Place 1 drop into both eyes 3 times daily) 5 mL 11    prednisoLONE acetate (PRED FORTE) 1 % ophthalmic suspension Place 1 drop Into the left eye 4 times daily 5 mL 0    predniSONE (DELTASONE) 20 MG tablet Take 2 tablets (40 mg) by mouth daily 10 tablet 3    pregabalin (LYRICA) 150 MG capsule TAKE 1 CAPSULE(150 MG) BY MOUTH TWICE DAILY 90 capsule 1    roflumilast (DALIRESP) 250 MCG TABS tablet Take 1 tablet (250 mcg) by mouth daily for 28 days, THEN 2 tablets  "(500 mcg) daily for 62 days. 152 tablet 0    semaglutide (OZEMPIC, 0.25 OR 0.5 MG/DOSE,) 2 MG/3ML pen Inject 0.5 mg Subcutaneous once a week 3 mL 11    STATIN NOT PRESCRIBED (INTENTIONAL) Please choose reason not prescribed from choices below.      TRELEGY ELLIPTA 200-62.5-25 MCG/ACT oral inhaler USE 1 INHALATION DAILY 28 each 5       Allergies   Allergen Reactions    Bee Venom Anaphylaxis    Interferons Dermatitis    Penicillins Hives    Aspirin      325mg     Colon Care         Social History     Tobacco Use    Smoking status: Former     Packs/day: 0.25     Types: Cigarettes    Smokeless tobacco: Never    Tobacco comments:     01/02/2023 Patient using 14 mg patch, wants to have prescription for Nicotrol inhaler, took workbook   Substance Use Topics    Alcohol use: Not Currently     Family History   Problem Relation Age of Onset    Thyroid Disease Mother     Cerebrovascular Disease Mother     Hypertension Mother     Hypertension Father     Glaucoma Father     Cancer Sister     Lung Cancer Sister     Diabetes Brother     Cancer Brother     Diabetes Brother     Cancer Brother     Diabetes Brother     Deep Vein Thrombosis (DVT) Daughter     Depression Daughter     Alcohol/Drug Other         self    Diabetes Other         self    Thyroid Disease Other         self    Asthma Other         self    Macular Degeneration No family hx of     Anesthesia Reaction No family hx of      History   Drug Use No         Objective     /76 (BP Location: Right arm, Patient Position: Sitting, Cuff Size: Adult Large)   Pulse 98   Ht 1.676 m (5' 6\")   Wt 92.9 kg (204 lb 12.8 oz)   SpO2 92%   BMI 33.06 kg/m      Physical Exam    GENERAL APPEARANCE: chronically ill -  alert and no distress - on Oxygen      EYES: dense cataracts bilaterally  EOMI, PERRL     HENT: ear canals and TM's normal and nose and mouth  edentulous - has 4 lower decayed incisors     NECK: no adenopathy, no asymmetry, masses, or scars and thyroid normal to " palpation     RESP: poor airway movement in all lung fields  - no rales, rhonchi or wheezes - has well healed thoracic scar right chest.       BREAST: normal without masses, tenderness or nipple discharge and no palpable axillary masses or adenopathy     CV: regular rates and rhythm, normal S1 S2, no S3 or S4 and no murmur, click or rub     ABDOMEN:  soft, nontender, no HSM or masses and bowel sounds normal     MS: left knee mild tenderness to palpation - no significant swelling -limited ROM lower extremities      SKIN: no suspicious lesions or rashes     NEURO: Normal strength and tone, sensory exam grossly normal, mentation intact and speech normal     PSYCH: mentation appears normal. and affect normal/bright     LYMPHATICS: No cervical adenopathy    Recent Labs   Lab Test 05/25/23  0944 05/25/23  0909 04/17/23  0932 02/24/23  0626 02/05/23  0539 02/03/23  2323 01/23/23  0933   HGB  --  13.8  --  13.0   < > 13.7 14.9   PLT  --  261  --  180   < > 190 188   INR  --   --   --   --   --  1.02  --    NA  --  144 145 143   < > 141 144   POTASSIUM  --  4.0 4.9 3.7   < > 4.0 4.0   CR 0.5 0.55 0.55 0.59   < > 0.44*  0.44* 0.49*   A1C  --   --  10.5*  --   --   --  9.4*    < > = values in this interval not displayed.        Diagnostics:  Labs pending at this time.  Results will be reviewed when available.     A1C = 7.3  - improved  Eletrolytes - K = 3.8 Sodium 141,, glucose 135   Urine microalb - spilling protein     No EKG required for low risk surgery (cataract, skin procedure, breast biopsy, etc).    Revised Cardiac Risk Index (RCRI):  The patient has the following serious cardiovascular risks for perioperative complications:   - No serious cardiac risks = 0 points     RCRI Interpretation: 0 points: Class I (very low risk - 0.4% complication rate)         Signed Electronically by: Adry De Los Santos MD PhD  Copy of this evaluation report is provided to requesting physician.    }Answers submitted by the patient for  this visit:  Symptoms you have experienced in the last 30 days (Submitted on 8/23/2023)  General Symptoms: No  Skin Symptoms: No  HENT Symptoms: No  EYE SYMPTOMS: Yes  HEART SYMPTOMS: No  LUNG SYMPTOMS: Yes  INTESTINAL SYMPTOMS: Yes  URINARY SYMPTOMS: No  GYNECOLOGIC SYMPTOMS: No  BREAST SYMPTOMS: No  SKELETAL SYMPTOMS: Yes  BLOOD SYMPTOMS: No  NERVOUS SYSTEM SYMPTOMS: Yes  MENTAL HEALTH SYMPTOMS: No  Please answer the questions below to tell us what conditions you are experiencing: (Submitted on 8/23/2023)  Vision loss: Yes  Dry eyes: Yes  Watery eyes: No  Eye bulging: No  Double vision: No  Flashing of lights: Yes  Floaters: Yes  Crossed eyes: No  Tunnel Vision: No  Yellowing of eyes: No  Eye irritation: Yes  Please answer the questions below to tell us what condition you are experiencing: (Submitted on 8/23/2023)  Sputum or phlegm: Yes  Coughing up blood: No  Snoring: No  Difficulty breathing on exertion: No  Nighttime Cough: No  Difficulty breathing when lying flat: Yes  Please answer the questions below to tell us what conditions you are experiencing: (Submitted on 8/23/2023)  Bloating: No  Blood in stool: No  Black stools: No  Fecal incontinence: No  Yellowing of skin or eyes: No  Vomit with blood: No  Change in stools: No  Please answer the questions below to tell us what condition you are experiencing: (Submitted on 8/23/2023)  Bone pain: No  Muscle cramps: No  Muscle weakness: No  Joint stiffness: No  Bone fracture: No  Please answer the questions below to tell us what condition you are experiencing: (Submitted on 8/23/2023)  Fainting or black-out spells: No  Memory loss: No  Speech problems: No  Tingling: No  Difficulty walking: Yes  Paralysis: No

## 2023-08-23 ENCOUNTER — LAB (OUTPATIENT)
Dept: LAB | Facility: CLINIC | Age: 68
End: 2023-08-23
Payer: COMMERCIAL

## 2023-08-23 ENCOUNTER — ANESTHESIA EVENT (OUTPATIENT)
Dept: SURGERY | Facility: AMBULATORY SURGERY CENTER | Age: 68
End: 2023-08-23
Payer: COMMERCIAL

## 2023-08-23 ENCOUNTER — OFFICE VISIT (OUTPATIENT)
Dept: FAMILY MEDICINE | Facility: CLINIC | Age: 68
End: 2023-08-23
Payer: COMMERCIAL

## 2023-08-23 VITALS
SYSTOLIC BLOOD PRESSURE: 135 MMHG | BODY MASS INDEX: 32.92 KG/M2 | OXYGEN SATURATION: 92 % | WEIGHT: 204.8 LBS | HEART RATE: 98 BPM | HEIGHT: 66 IN | DIASTOLIC BLOOD PRESSURE: 76 MMHG

## 2023-08-23 DIAGNOSIS — E53.8 VITAMIN B12 DEFICIENCY (NON ANEMIC): ICD-10-CM

## 2023-08-23 DIAGNOSIS — I10 PRIMARY HYPERTENSION: ICD-10-CM

## 2023-08-23 DIAGNOSIS — J44.9 CHRONIC OBSTRUCTIVE PULMONARY DISEASE, UNSPECIFIED COPD TYPE (H): ICD-10-CM

## 2023-08-23 DIAGNOSIS — Z01.818 PREOP GENERAL PHYSICAL EXAM: Primary | ICD-10-CM

## 2023-08-23 DIAGNOSIS — E11.9 TYPE 2 DIABETES MELLITUS WITHOUT COMPLICATION, WITHOUT LONG-TERM CURRENT USE OF INSULIN (H): ICD-10-CM

## 2023-08-23 DIAGNOSIS — H25.813 COMBINED FORMS OF AGE-RELATED CATARACT OF BOTH EYES: ICD-10-CM

## 2023-08-23 DIAGNOSIS — E11.65 TYPE 2 DIABETES MELLITUS WITH HYPERGLYCEMIA, WITHOUT LONG-TERM CURRENT USE OF INSULIN (H): ICD-10-CM

## 2023-08-23 DIAGNOSIS — C34.90 PRIMARY ADENOCARCINOMA OF LUNG, UNSPECIFIED LATERALITY (H): ICD-10-CM

## 2023-08-23 LAB
ANION GAP SERPL CALCULATED.3IONS-SCNC: 8 MMOL/L (ref 7–15)
BUN SERPL-MCNC: 11.2 MG/DL (ref 8–23)
CALCIUM SERPL-MCNC: 9.4 MG/DL (ref 8.8–10.2)
CHLORIDE SERPL-SCNC: 102 MMOL/L (ref 98–107)
CREAT SERPL-MCNC: 0.58 MG/DL (ref 0.51–0.95)
CREAT UR-MCNC: 58.2 MG/DL
DEPRECATED HCO3 PLAS-SCNC: 31 MMOL/L (ref 22–29)
GFR SERPL CREATININE-BSD FRML MDRD: >90 ML/MIN/1.73M2
GLUCOSE SERPL-MCNC: 135 MG/DL (ref 70–99)
HBA1C MFR BLD: 7.5 %
MICROALBUMIN UR-MCNC: 158 MG/L
MICROALBUMIN/CREAT UR: 271.48 MG/G CR (ref 0–25)
POTASSIUM SERPL-SCNC: 3.8 MMOL/L (ref 3.4–5.3)
SODIUM SERPL-SCNC: 141 MMOL/L (ref 136–145)
VIT B12 SERPL-MCNC: 415 PG/ML (ref 232–1245)

## 2023-08-23 PROCEDURE — 99215 OFFICE O/P EST HI 40 MIN: CPT | Performed by: FAMILY MEDICINE

## 2023-08-23 PROCEDURE — 82607 VITAMIN B-12: CPT | Performed by: INTERNAL MEDICINE

## 2023-08-23 PROCEDURE — 80048 BASIC METABOLIC PNL TOTAL CA: CPT | Performed by: PATHOLOGY

## 2023-08-23 PROCEDURE — 36415 COLL VENOUS BLD VENIPUNCTURE: CPT | Performed by: PATHOLOGY

## 2023-08-23 PROCEDURE — 83036 HEMOGLOBIN GLYCOSYLATED A1C: CPT | Performed by: INTERNAL MEDICINE

## 2023-08-23 PROCEDURE — 82570 ASSAY OF URINE CREATININE: CPT | Performed by: INTERNAL MEDICINE

## 2023-08-23 PROCEDURE — 99000 SPECIMEN HANDLING OFFICE-LAB: CPT | Performed by: PATHOLOGY

## 2023-08-23 ASSESSMENT — ENCOUNTER SYMPTOMS
LOSS OF CONSCIOUSNESS: 0
SPUTUM PRODUCTION: 1
MUSCLE CRAMPS: 0
COUGH: 1
ABDOMINAL PAIN: 0
NECK PAIN: 1
ARTHRALGIAS: 1
NAUSEA: 0
DYSPNEA ON EXERTION: 0
EYE IRRITATION: 1
WHEEZING: 1
DIARRHEA: 0
NUMBNESS: 1
TINGLING: 0
VOMITING: 0
POSTURAL DYSPNEA: 1
RECTAL PAIN: 0
BACK PAIN: 0
BOWEL INCONTINENCE: 0
BLOATING: 0
COUGH DISTURBING SLEEP: 0
HEADACHES: 1
JAUNDICE: 0
SHORTNESS OF BREATH: 1
JOINT SWELLING: 0
HEARTBURN: 1
SEIZURES: 0
MUSCLE WEAKNESS: 0
SPEECH CHANGE: 0
EYE WATERING: 0
MYALGIAS: 0
PARALYSIS: 0
TREMORS: 0
EYE REDNESS: 1
DOUBLE VISION: 0
EYE PAIN: 0
WEAKNESS: 1
CONSTIPATION: 0
BLOOD IN STOOL: 0
MEMORY LOSS: 0
SNORES LOUDLY: 0
STIFFNESS: 0
SEIZURES: 0
DIZZINESS: 0
HEMOPTYSIS: 0

## 2023-08-23 ASSESSMENT — COPD QUESTIONNAIRES
CAT_SEVERITY: SEVERE
COPD: 1

## 2023-08-23 ASSESSMENT — LIFESTYLE VARIABLES: TOBACCO_USE: 1

## 2023-08-23 NOTE — NURSING NOTE
"Jenn Aparicio is a 68 year old female patient that presents today in clinic for the following:    Chief Complaint   Patient presents with    Pre-Op Exam     The patient's allergies and medications were reviewed as noted. A set of vitals were recorded as noted without incident: /76 (BP Location: Right arm, Patient Position: Sitting, Cuff Size: Adult Large)   Pulse 98   Ht 1.676 m (5' 6\")   Wt 92.9 kg (204 lb 12.8 oz)   SpO2 92%   BMI 33.06 kg/m  . The patient does not have any other questions for the provider.    Aleta Leal, EMT at 10:38 AM on 8/23/2023.  Primary care clinic: [phone]    "

## 2023-08-23 NOTE — ANESTHESIA PREPROCEDURE EVALUATION
Anesthesia Pre-Procedure Evaluation    Patient: Jenn Aparicio   MRN: [id number] : 1955        Procedure : Procedure(s):  LEFT EYE PHACOEMULSIFICATION, CATARACT, WITH INTRAOCULAR LENS IMPLANT          Past Medical History:   Diagnosis Date    Adenocarcinoma, lung (H)     Asthma     Ectopic pregnancy     Esophageal reflux     Pulmonary emphysema (H)     Very severe FEV1<30% predicted    Type II diabetes mellitus (H)       Past Surgical History:   Procedure Laterality Date    16                R thoracotomy, RLL lobectomy (Dr. Cunha). Adenocarcinoma, 1.1 cm, assoicated with atypical adenomatous hyperplasia Right 2016    R thoracotomy, RLL lobectomy (Dr. Cunha). Adenocarcinoma, 1.1 cm, assoicated with atypical adenomatous hyperplasia    BRONCHOSCOPY, WITH BIOPSY, ROBOT ASSISTED N/A 2023    Procedure: robot assisted Ion BRONCHOSCOPY, WITH BIOPSY;  Surgeon: Patria Garcia MD;  Location: [facility] OR    ENDOBRONCHIAL ULTRASOUND FLEXIBLE N/A 2023    Procedure: Endobronchial ultrasound flexible;  Surgeon: Patria Garcia MD;  Location:  OR    ESOPHAGOSCOPY, GASTROSCOPY, DUODENOSCOPY (EGD), COMBINED N/A 2022    Procedure: ESOPHAGOGASTRODUODENOSCOPY (EGD);  Surgeon: Travis Briseno MD;  Location:  GI    ESOPHAGOSCOPY, GASTROSCOPY, DUODENOSCOPY (EGD), COMBINED N/A 2023    Procedure: ESOPHAGOGASTRODUODENOSCOPY, WITH BIOPSY;  Surgeon: Chao Rodrigues DO;  Location:  GI    ORTHOPEDIC SURGERY        Allergies   Allergen Reactions    Bee Venom Anaphylaxis    Interferons Dermatitis    Penicillins Hives    Aspirin      325mg     Colon Care       Social History     Tobacco Use    Smoking status: Former     Packs/day: 0.25     Types: Cigarettes    Smokeless tobacco: Never    Tobacco comments:     2023 Patient using 14 mg patch, wants to have prescription for Nicotrol inhaler, took workbook   Substance Use Topics    Alcohol use: Not Currently      Wt Readings from Last 1 Encounters:   23  92.9 kg (204 lb 12.8 oz)        Anesthesia Evaluation   Pt has had prior anesthetic.     No history of anesthetic complications       ROS/MED HX  ENT/Pulmonary:     (+) sleep apnea (not using CPAP, but has one), doesn't use CPAP,              tobacco use, Past use,       severe,  COPD, O2 dependent,             Neurologic:  - neg neurologic ROS  (-) no seizures and no CVA   Cardiovascular:     (+)  hypertension- -   -  - -   Taking blood thinners                              Previous cardiac testing   Echo: Date: 9/2022 Results:  Interpretation Summary  Left ventricular size, wall motion and function are normal. The ejection  fraction is 55-60%.     Right ventricular function, chamber size, wall motion, and thickness are  normal.     No pericardial effusion is present.     The inferior vena cava is normal.     No significant valvular abnormalities present.     There is no prior study for direct comparison.  Stress Test:  Date: Results:    ECG Reviewed:  Date: 2/20/23 Results:  Sinus tachycardia, septal infarct  Cath:  Date: Results:      METS/Exercise Tolerance: 1 - Eating, dressing Comment: Limited activity. Can walk with walker but limited due to RUSSELL for past 2 years.    Hematologic:     (+)       history of blood transfusion, no previous transfusion reaction,     (-) history of blood clots   Musculoskeletal:  - neg musculoskeletal ROS     GI/Hepatic: Comment: Esophageal dysmotility     (+) GERD,    hepatitis resolved      hepatitis type C,        Renal/Genitourinary:  - neg Renal ROS     Endo: Comment: Prednisone on hand, but no recent use    (+)  type II DM, Last HgA1c: 10.5, date: 4/2023, Not using insulin,  Normal glucose range: 50 at night, 150-200,               Psychiatric/Substance Use:  - neg psychiatric ROS     Infectious Disease:  - neg infectious disease ROS     Malignancy:   (+) Malignancy, History of Lung.  Lung CA status post Radiation and Surgery.      Other:            Physical Exam    Airway   airway exam normal      Mallampati: IV   TM distance: > 3 FB   Neck ROM: limited   Mouth opening: < 3 cm    Respiratory Devices and Support         Dental     Comment: Only has 3 lower rotted teeth (front)    (+) Multiple visibly decayed, broken teeth      Cardiovascular   cardiovascular exam normal       Rhythm and rate: regular     Pulmonary           (+) rhonchi, decreased breath sounds and wheezes           OUTSIDE LABS:  CBC:   Lab Results   Component Value Date    WBC 6.1 05/25/2023    WBC 8.5 02/24/2023    HGB 13.8 05/25/2023    HGB 13.0 02/24/2023    HCT 45.0 05/25/2023    HCT 43.4 02/24/2023     05/25/2023     02/24/2023     BMP:   Lab Results   Component Value Date     08/23/2023     05/25/2023    POTASSIUM 3.8 08/23/2023    POTASSIUM 4.0 05/25/2023    CHLORIDE 102 08/23/2023    CHLORIDE 105 05/25/2023    CO2 31 (H) 08/23/2023    CO2 32 (H) 05/25/2023    BUN 11.2 08/23/2023    BUN 8.1 05/25/2023    CR 0.58 08/23/2023    CR 0.5 05/25/2023     (H) 08/23/2023     (H) 08/08/2023     COAGS:   Lab Results   Component Value Date    PTT 30 04/30/2008    INR 1.02 02/03/2023     POC:   Lab Results   Component Value Date     (H) 06/25/2021    HCG Negative 04/20/2011     HEPATIC:   Lab Results   Component Value Date    ALBUMIN 4.0 05/25/2023    PROTTOTAL 7.0 05/25/2023    ALT 9 (L) 05/25/2023    AST 14 05/25/2023    ALKPHOS 89 05/25/2023    BILITOTAL 0.3 05/25/2023    LACEY 15 02/07/2008     OTHER:   Lab Results   Component Value Date    PH 7.36 02/23/2023    LACT 0.9 02/20/2023    LACT 0.8 02/20/2023    A1C 7.5 (H) 08/23/2023    LUIGI 9.4 08/23/2023    PHOS 2.9 05/20/2022    MAG 2.1 05/22/2022    TSH 2.65 08/31/2022    CRP <2.9 05/22/2022       Anesthesia Plan    ASA Status:  3    NPO Status:  NPO Appropriate    Anesthesia Type: MAC (no narctics).     - Reason for MAC: straight local not clinically adequate   Induction: Intravenous.   Maintenance: TIVA.         Consents    Anesthesia Plan(s) and associated risks, benefits, and realistic alternatives discussed. Questions answered and patient/representative(s) expressed understanding.     - Discussed: Risks, Benefits and Alternatives for BOTH SEDATION and the PROCEDURE were discussed     - Discussed with:  Patient      - Extended Intubation/Ventilatory Support Discussed: No.      - Patient is DNR/DNI Status: No     Use of blood products discussed: No .     Postoperative Care    Pain management: Multi-modal analgesia.   PONV prophylaxis: Background Propofol Infusion, Ondansetron (or other 5HT-3)     Comments:                Jackie Mobley MD

## 2023-08-23 NOTE — PATIENT INSTRUCTIONS
Take your blood pressure medicine tomorrow morning  Start jardiance after surgery tomorrow  Start ozempic next wk  Use   heat pack on your knee  Take tylenol for the knee    See Mitzi Campos for a physical in near future.

## 2023-08-24 ENCOUNTER — ANESTHESIA (OUTPATIENT)
Dept: SURGERY | Facility: AMBULATORY SURGERY CENTER | Age: 68
End: 2023-08-24
Payer: COMMERCIAL

## 2023-08-24 ENCOUNTER — HOSPITAL ENCOUNTER (OUTPATIENT)
Facility: AMBULATORY SURGERY CENTER | Age: 68
Discharge: HOME | End: 2023-08-24
Attending: STUDENT IN AN ORGANIZED HEALTH CARE EDUCATION/TRAINING PROGRAM
Payer: COMMERCIAL

## 2023-08-24 VITALS
RESPIRATION RATE: 14 BRPM | BODY MASS INDEX: 33.99 KG/M2 | HEART RATE: 92 BPM | SYSTOLIC BLOOD PRESSURE: 132 MMHG | WEIGHT: 204 LBS | HEIGHT: 65 IN | OXYGEN SATURATION: 96 % | TEMPERATURE: 98 F | DIASTOLIC BLOOD PRESSURE: 63 MMHG

## 2023-08-24 DIAGNOSIS — H25.813 COMBINED FORMS OF AGE-RELATED CATARACT OF BOTH EYES: Primary | ICD-10-CM

## 2023-08-24 LAB — GLUCOSE BLDC GLUCOMTR-MCNC: 121 MG/DL (ref 70–99)

## 2023-08-24 PROCEDURE — 66984 XCAPSL CTRC RMVL W/O ECP: CPT | Mod: LT

## 2023-08-24 PROCEDURE — 82962 GLUCOSE BLOOD TEST: CPT | Performed by: PATHOLOGY

## 2023-08-24 DEVICE — LENS CC60WF 25.0 CLAREON UV ASPHERIC BICONVEX IOL: Type: IMPLANTABLE DEVICE | Site: EYE | Status: FUNCTIONAL

## 2023-08-24 RX ORDER — CYCLOPENTOLAT/TROPIC/PHENYLEPH 1%-1%-2.5%
1 DROPS (EA) OPHTHALMIC (EYE)
Status: COMPLETED | OUTPATIENT
Start: 2023-08-24 | End: 2023-08-24

## 2023-08-24 RX ORDER — PROPARACAINE HYDROCHLORIDE 5 MG/ML
1 SOLUTION/ DROPS OPHTHALMIC ONCE
Status: COMPLETED | OUTPATIENT
Start: 2023-08-24 | End: 2023-08-24

## 2023-08-24 RX ORDER — SODIUM CHLORIDE, SODIUM LACTATE, POTASSIUM CHLORIDE, CALCIUM CHLORIDE 600; 310; 30; 20 MG/100ML; MG/100ML; MG/100ML; MG/100ML
INJECTION, SOLUTION INTRAVENOUS CONTINUOUS
Status: DISCONTINUED | OUTPATIENT
Start: 2023-08-24 | End: 2023-08-25 | Stop reason: HOSPADM

## 2023-08-24 RX ORDER — SODIUM CHLORIDE, SODIUM LACTATE, POTASSIUM CHLORIDE, CALCIUM CHLORIDE 600; 310; 30; 20 MG/100ML; MG/100ML; MG/100ML; MG/100ML
INJECTION, SOLUTION INTRAVENOUS CONTINUOUS
Status: DISCONTINUED | OUTPATIENT
Start: 2023-08-24 | End: 2023-08-24 | Stop reason: HOSPADM

## 2023-08-24 RX ORDER — DEXAMETHASONE SODIUM PHOSPHATE 4 MG/ML
INJECTION, SOLUTION INTRA-ARTICULAR; INTRALESIONAL; INTRAMUSCULAR; INTRAVENOUS; SOFT TISSUE PRN
Status: DISCONTINUED | OUTPATIENT
Start: 2023-08-24 | End: 2023-08-24 | Stop reason: HOSPADM

## 2023-08-24 RX ORDER — MOXIFLOXACIN 5 MG/ML
1 SOLUTION/ DROPS OPHTHALMIC
Status: COMPLETED | OUTPATIENT
Start: 2023-08-24 | End: 2023-08-24

## 2023-08-24 RX ORDER — FENTANYL CITRATE 50 UG/ML
25 INJECTION, SOLUTION INTRAMUSCULAR; INTRAVENOUS
Status: DISCONTINUED | OUTPATIENT
Start: 2023-08-24 | End: 2023-08-25 | Stop reason: HOSPADM

## 2023-08-24 RX ORDER — BALANCED SALT SOLUTION 6.4; .75; .48; .3; 3.9; 1.7 MG/ML; MG/ML; MG/ML; MG/ML; MG/ML; MG/ML
SOLUTION OPHTHALMIC PRN
Status: DISCONTINUED | OUTPATIENT
Start: 2023-08-24 | End: 2023-08-24 | Stop reason: HOSPADM

## 2023-08-24 RX ORDER — ONDANSETRON 2 MG/ML
INJECTION INTRAMUSCULAR; INTRAVENOUS PRN
Status: DISCONTINUED | OUTPATIENT
Start: 2023-08-24 | End: 2023-08-24

## 2023-08-24 RX ORDER — FENTANYL CITRATE 50 UG/ML
INJECTION, SOLUTION INTRAMUSCULAR; INTRAVENOUS PRN
Status: DISCONTINUED | OUTPATIENT
Start: 2023-08-24 | End: 2023-08-24

## 2023-08-24 RX ORDER — OXYCODONE HYDROCHLORIDE 5 MG/1
5 TABLET ORAL
Status: DISCONTINUED | OUTPATIENT
Start: 2023-08-24 | End: 2023-08-25 | Stop reason: HOSPADM

## 2023-08-24 RX ORDER — MOXIFLOXACIN IN NACL,ISO-OS/PF 0.3MG/0.3
SYRINGE (ML) INTRAOCULAR PRN
Status: DISCONTINUED | OUTPATIENT
Start: 2023-08-24 | End: 2023-08-24 | Stop reason: HOSPADM

## 2023-08-24 RX ORDER — TIMOLOL MALEATE 5 MG/ML
SOLUTION/ DROPS OPHTHALMIC PRN
Status: DISCONTINUED | OUTPATIENT
Start: 2023-08-24 | End: 2023-08-24 | Stop reason: HOSPADM

## 2023-08-24 RX ORDER — ONDANSETRON 4 MG/1
4 TABLET, ORALLY DISINTEGRATING ORAL EVERY 30 MIN PRN
Status: DISCONTINUED | OUTPATIENT
Start: 2023-08-24 | End: 2023-08-25 | Stop reason: HOSPADM

## 2023-08-24 RX ORDER — LIDOCAINE HYDROCHLORIDE 10 MG/ML
INJECTION, SOLUTION EPIDURAL; INFILTRATION; INTRACAUDAL; PERINEURAL PRN
Status: DISCONTINUED | OUTPATIENT
Start: 2023-08-24 | End: 2023-08-24 | Stop reason: HOSPADM

## 2023-08-24 RX ORDER — ACETAZOLAMIDE 500 MG/5ML
500 INJECTION, POWDER, LYOPHILIZED, FOR SOLUTION INTRAVENOUS ONCE
Status: COMPLETED | OUTPATIENT
Start: 2023-08-24 | End: 2023-08-24

## 2023-08-24 RX ORDER — ONDANSETRON 2 MG/ML
4 INJECTION INTRAMUSCULAR; INTRAVENOUS EVERY 30 MIN PRN
Status: DISCONTINUED | OUTPATIENT
Start: 2023-08-24 | End: 2023-08-25 | Stop reason: HOSPADM

## 2023-08-24 RX ORDER — TETRACAINE HYDROCHLORIDE 5 MG/ML
SOLUTION OPHTHALMIC PRN
Status: DISCONTINUED | OUTPATIENT
Start: 2023-08-24 | End: 2023-08-24 | Stop reason: HOSPADM

## 2023-08-24 RX ORDER — LIDOCAINE 40 MG/G
CREAM TOPICAL
Status: DISCONTINUED | OUTPATIENT
Start: 2023-08-24 | End: 2023-08-24 | Stop reason: HOSPADM

## 2023-08-24 RX ORDER — OXYCODONE HYDROCHLORIDE 5 MG/1
10 TABLET ORAL
Status: DISCONTINUED | OUTPATIENT
Start: 2023-08-24 | End: 2023-08-25 | Stop reason: HOSPADM

## 2023-08-24 RX ADMIN — ONDANSETRON 4 MG: 2 INJECTION INTRAMUSCULAR; INTRAVENOUS at 07:13

## 2023-08-24 RX ADMIN — Medication 1 DROP: at 07:04

## 2023-08-24 RX ADMIN — SODIUM CHLORIDE, SODIUM LACTATE, POTASSIUM CHLORIDE, CALCIUM CHLORIDE: 600; 310; 30; 20 INJECTION, SOLUTION INTRAVENOUS at 07:10

## 2023-08-24 RX ADMIN — ACETAZOLAMIDE 500 MG: 500 INJECTION, POWDER, LYOPHILIZED, FOR SOLUTION INTRAVENOUS at 07:01

## 2023-08-24 RX ADMIN — Medication 1 DROP: at 06:55

## 2023-08-24 RX ADMIN — FENTANYL CITRATE 25 MCG: 50 INJECTION, SOLUTION INTRAMUSCULAR; INTRAVENOUS at 07:29

## 2023-08-24 RX ADMIN — Medication 1 DROP: at 06:47

## 2023-08-24 RX ADMIN — MOXIFLOXACIN 1 DROP: 5 SOLUTION/ DROPS OPHTHALMIC at 07:04

## 2023-08-24 RX ADMIN — MOXIFLOXACIN 1 DROP: 5 SOLUTION/ DROPS OPHTHALMIC at 06:47

## 2023-08-24 RX ADMIN — MOXIFLOXACIN 1 DROP: 5 SOLUTION/ DROPS OPHTHALMIC at 06:55

## 2023-08-24 RX ADMIN — FENTANYL CITRATE 25 MCG: 50 INJECTION, SOLUTION INTRAMUSCULAR; INTRAVENOUS at 07:14

## 2023-08-24 RX ADMIN — PROPARACAINE HYDROCHLORIDE 1 DROP: 5 SOLUTION/ DROPS OPHTHALMIC at 06:47

## 2023-08-24 NOTE — ANESTHESIA CARE TRANSFER NOTE
Patient: Jenn Aparicio    Procedure: Procedure(s):  LEFT EYE PHACOEMULSIFICATION, CATARACT, WITH INTRAOCULAR LENS IMPLANT       Diagnosis: Combined forms of age-related cataract of both eyes [H25.813]  Diagnosis Additional Information: No value filed.    Anesthesia Type:   MAC     Note:    Oropharynx: oropharynx clear of all foreign objects and spontaneously breathing  Level of Consciousness: awake  Oxygen Supplementation: room air    Independent Airway: airway patency satisfactory and stable  Dentition: dentition unchanged  Vital Signs Stable: post-procedure vital signs reviewed and stable  Report to RN Given: handoff report given  Patient transferred to: Phase II    Handoff Report: Identifed the Patient, Identified the Reponsible Provider, Reviewed the pertinent medical history, Discussed the surgical course, Reviewed Intra-OP anesthesia mangement and issues during anesthesia, Set expectations for post-procedure period and Allowed opportunity for questions and acknowledgement of understanding      Vitals:  Vitals Value Taken Time   BP     Temp     Pulse     Resp     SpO2         Electronically Signed By: BENOIT Osorio CRNA  August 24, 2023  7:56 AM

## 2023-08-24 NOTE — OR NURSING
Patient took her jardiance yesterday. Dr. Mobley and Charli updated. Per Dr. Augustin ok to proceed with procedure today. Reminded patient to hold her jardiance for 3 days prior to her next cataract surgery over here. Patient verbalized understanding.     Rani Bradnon RN

## 2023-08-24 NOTE — DISCHARGE INSTRUCTIONS
"Cataract Surgery Post-Operative Instructions      You have undergone cataract surgery today. Take it easy as the mild anesthesia wears off.     After cataract surgery you will have an eye shield over your eye and things might be a little blurry. This is normal. It will clear up over the coming days. Your eye may feel a little scratchy and this should get better over the next few days    It is okay to resume your previous diet    Use Tylenol (if there is no contraindication from a medical standpoint) if you experience any eye pain    Avoid sleeping on or putting pressure on the operated eye    Sleep with the eye shield at bedtime for the first week    You may resume light activity tomorrow, but no heavy lifting, no straining, no bending over especially the first week    Do not rub the eyes, no water in the eyes (no pools or hot tubs or tap water); Please wait until tomorrow to shower, and when you do shower take care not to get water in the surgical eye    You can continue the following eye drops starting tonight:    Vigamox (tan cap) 1 drop four times a day to surgical eye for 1 week, then stop  Ketorolac (grey top) 1 drop four times a day to surgical eye for 1 week, then 1 drop three times a day to surgical eye for 1 week, then 1 drop two times a day to surgical eye for 1 week , then 1 drop once a day to surgical eye for 1 week, then stop  Predforte (pink or white top) 1 drop four times a day to surgical eye for 1 week, then 1 drop three times a day to surgical eye for 1 week, then 1 drop two times a day to surgical eye for 1 week , then 1 drop once a day to surgical eye for 1 week, then stop  OPTIONAL: preservative free artificial tears (refresh, thera tears, systane, etc) 1 drop 4-6 times per day as needed (DO NOT use Visine or Clear Eyes or any eye drop product that states \"red out\")  If on glaucoma medications, then continue regular eye pressure drops  **wait 5-10 minutes between each drop**  **can refrigerate " "eye drops to reduce burning or stinging sensation**    Please contact Dr Keita at [phone] if any issues arise    Your follow up appointment is as scheduled on 8/25/2023 at 10:30 AM    [facility] - Department of Ophthalmology  [address] [place], [address]        [facility]  Home Care Following Anesthesia  For 24 hours after surgery:  Get plenty of rest.  A responsible adult must stay with you for at least 24 hours after you leave the surgery center.  Do not drive or use heavy equipment.  If you have weakness or tingling, don't drive or use heavy equipment until this feeling goes away.   Do not drink alcohol.   Avoid strenuous or risky activities.  Ask for help when climbing stairs.  You may feel lightheaded.  IF so, sit for a few minutes before standing.  Have someone help you get up.   If you have nausea (feel sick to your stomach): Drink only clear liquids such as apple juice, ginger ale, broth or 7-Up.  Rest may also help.  Be sure to drink enough fluids.  Move to a regular diet as you feel able.   You may have a slight fever.  Call the doctor if your fever is over 100 F (37.7 C) (taken under the tongue) or lasts longer than 24 hours.  You may have a dry mouth, a sore throat, muscle aches or trouble sleeping. These should go away after 24 hours.  Do not make important or legal decisions.   It is recommended to avoid smoking.        Today you received a Marcaine or bupivacaine block to numb the nerves near your surgery site.  This is a block using local anesthetic or \"numbing\" medication injected around the nerves to anesthetize or \"numb\" the area supplied by those nerves.  This block is injected into the muscle layer near your surgical site.  The medication may numb the location where you had surgery for 6-18 hours, but may last up to 24 hours.  If your surgical site is an arm or leg you should be careful with your affected limb, since it is " possible to injure your limb without being aware of it due to the numbing.  Until full feeling returns, you should guard against bumping or hitting your limb, and avoid extreme hot or cold temperatures on the skin.  As the block wears off, the feeling will return as a tingling or prickly sensation near your surgical site.  You will experience more discomfort from your incision as the feeling returns.  You may want to take a pain pill (a narcotic or Tylenol if this was prescribed by your surgeon) when you start to experience mild pain before the pain beccomes more severe.  If your pain medications do not control your pain you should notifiy your surgeon.    Tips for taking pain medications  To get the best pain relief possible, remember these points:  Take pain medications as directed, before pain becomes severe.  Pain medication can upset your stomach: taking it with food may help.  Constipation is a common side effect of pain medication. Drink plenty of  fluids.  Eat foods high in fiber. Take a stool softener if recommended by your doctor or pharmacist.  Do not drink alcohol, drive or operate machinery while taking pain medications.  Ask about other ways to control pain, such as with heat, ice or relaxation.    Tylenol/Acetaminophen Consumption    If you feel your pain relief is insufficient, you may take Tylenol/Acetaminophen in addition to your narcotic pain medication.   Be careful not to exceed 4,000 mg of Tylenol/Acetaminophen in a 24 hour period from all sources.  If you are taking extra strength Tylenol/acetaminophen (500 mg), the maximum dose is 8 tablets in 24 hours.  If you are taking regular strength acetaminophen (325 mg), the maximum dose is 12 tablets in 24 hours.    Call a doctor for any of the following:  Signs of infection (fever, growing tenderness at the surgery site, a large amount of drainage or bleeding, severe pain, foul-smelling drainage, redness, swelling).  It has been over 8 to 10 hours  since surgery and you are still not able to urinate (pass water).  Headache for over 24 hours.  Numbness, tingling or weakness the day after surgery (if you had spinal anesthesia).  Signs of Covid-19 infection (temperature over 100 degrees, shortness of breath, cough, loss of taste/smell, generalized body aches, persistent headache, chills, sore throat, nausea/vomiting/diarrhea)  Your doctor is:       Dr. Re Keita, Ophthalmology: [phone]               Or dial [phone] and ask for the resident on call for:  Ophthalmology  For emergency care, call the:  [facility]:  [phone] (TTY for hearing impaired: [phone])

## 2023-08-24 NOTE — H&P
Ophthalmology Preoperative History and Physical    Date of Encounter: 8/24/2023  Primary Care Physician:  Mitzi Nettles     Chief Complaint:  Blurry vision    Reason for visit: left ee cataract surgery    History of Present Illness: Jenn Aparicio is a 68 year old female who presents here for cataract surgery of lefteye in the setting of progressively blurry vision affecting patient's quality of life and activities of daily living.    History is obtained from the patient and chart review. Reports persistent blurry vision in planned procedure eye. No ongoing chest pain, palpitations, nausea, vomiting, diarrhea. Does have chronic shortness of breath from COPD for which patient is using oxygen via nasal cannula and doing well with no change in oxygen requirement. No significant changes from most recent preop H&P noted on epic 7/10/23 with primary care provider Mitzi LABOY CNP.    ROS: 10 point ROS neg other than the symptoms noted above in the HPI.    Past Medical History  Past Medical History:   Diagnosis Date    Adenocarcinoma, lung (H)     Asthma     Ectopic pregnancy     Esophageal reflux     Pulmonary emphysema (H)     Very severe FEV1<30% predicted    Type II diabetes mellitus (H)        Allergies  Allergies   Allergen Reactions    Bee Venom Anaphylaxis    Interferons Dermatitis    Penicillins Hives    Aspirin      325mg     Colon Care        Prior to Admission Medications  Current Outpatient Medications   Medication Sig Dispense Refill    acetylcysteine (MUCOMYST) 10 % nebulizer solution Inhale 4 mLs into the lungs 4 times daily (Patient taking differently: Inhale 4 mLs into the lungs as needed) 480 mL 0    albuterol (PROAIR HFA/PROVENTIL HFA/VENTOLIN HFA) 108 (90 Base) MCG/ACT inhaler USE 1 OR 2 INHALATIONS EVERY 4 HOURS AS NEEDED (Patient taking differently: Inhale 1 puff into the lungs daily USE 1 OR 2 INHALATIONS EVERY 4 HOURS AS NEEDED) 17 g 3    albuterol (PROVENTIL) (2.5 MG/3ML) 0.083% neb  solution Take 1 vial (2.5 mg) by nebulization 4 times daily 360 mL 0    Alpha Lipoic Acid 200 MG CAPS Take 1 capsule by mouth daily (Patient taking differently: Take 1 capsule by mouth every morning) 90 capsule 3    amLODIPine (NORVASC) 10 MG tablet Take 1 tablet (10 mg) by mouth daily 90 tablet 3    ASPIRIN LOW DOSE 81 MG EC tablet TAKE 1 TABLET DAILY (Patient taking differently: Take 81 mg by mouth every morning) 90 tablet 2    buPROPion (WELLBUTRIN XL) 300 MG 24 hr tablet Take 1 tablet (300 mg) by mouth every morning 90 tablet 3    cetirizine (ZYRTEC) 10 MG tablet Take 1 tablet (10 mg) by mouth every morning 30 tablet 11    cyanocobalamin (VITAMIN B-12) 500 MCG tablet Take 1 tablet (500 mcg) by mouth daily (Patient taking differently: Take 500 mcg by mouth every morning) 90 tablet 1    doxycycline hyclate (VIBRA-TABS) 100 MG tablet Take 1 tablet (100 mg) by mouth 2 times daily (Patient taking differently: Take 100 mg by mouth every morning) 10 tablet 1    empagliflozin (JARDIANCE) 25 MG TABS tablet Take 1 tablet (25 mg) by mouth daily (Patient taking differently: Take 25 mg by mouth every morning) 90 tablet 1    fluticasone (FLONASE) 50 MCG/ACT nasal spray Spray 1 spray into both nostrils 2 times daily Use at night before bed 15.8 mL 3    GLUCOSAMINE-CHONDROITIN -400 MG tablet TAKE 1 TABLET DAILY (Patient taking differently: Take 1 tablet by mouth every evening) 90 tablet 3    ketorolac (ACULAR) 0.5 % ophthalmic solution Place 1 drop Into the left eye 4 times daily (Patient taking differently: Place 1 drop Into the left eye 2 times daily) 5 mL 0    ketotifen (ZADITOR) 0.025 % ophthalmic solution Place 1 drop into both eyes 2 times daily      moxifloxacin (VIGAMOX) 0.5 % ophthalmic solution Place 1 drop Into the left eye 4 times daily 3 mL 0    olopatadine (PATADAY) 0.2 % ophthalmic solution Place 0.05 mLs (1 drop) into both eyes daily 2.5 mL 11    omega-3 acid ethyl esters (LOVAZA) 1 g capsule Take 2  capsules (2 g) by mouth 2 times daily (Patient taking differently: Take 1 g by mouth 2 times daily Take 2 capsules (2 g) by mouth 2 times daily) 360 capsule 3    omeprazole (PRILOSEC) 20 MG DR capsule TAKE 1 CAPSULE TWICE A  capsule 3    polyethylene glycol-propylene glycol (SYSTANE) 0.4-0.3 % SOLN ophthalmic solution Place 1 drop into both eyes 4 times daily 5 mL 11    polyethylene glycol-propylene glycol (SYSTANE) 0.4-0.3 % SOLN ophthalmic solution Place 1 drop into both eyes 4 times daily (Patient taking differently: Place 1 drop into both eyes 3 times daily) 5 mL 11    prednisoLONE acetate (PRED FORTE) 1 % ophthalmic suspension Place 1 drop Into the left eye 4 times daily 5 mL 0    predniSONE (DELTASONE) 20 MG tablet Take 2 tablets (40 mg) by mouth daily 10 tablet 3    pregabalin (LYRICA) 150 MG capsule TAKE 1 CAPSULE(150 MG) BY MOUTH TWICE DAILY 90 capsule 1    roflumilast (DALIRESP) 250 MCG TABS tablet Take 1 tablet (250 mcg) by mouth daily for 28 days, THEN 2 tablets (500 mcg) daily for 62 days. 152 tablet 0    TRELEGY ELLIPTA 200-62.5-25 MCG/ACT oral inhaler USE 1 INHALATION DAILY 28 each 5    Continuous Blood Gluc Sensor (FREESTYLE GIOVANNI 2 SENSOR) MISC 1 each every 14 days For use with Freestyle Giovanni 2  for continuous monitioring of blood glucose levels. Replace sensor every 14 days. 2 each 11    EPINEPHrine (ANY BX GENERIC EQUIV) 0.3 MG/0.3ML injection 2-pack Inject 0.3 mLs (0.3 mg) into the muscle as needed for anaphylaxis (related to bee stings) May repeat one time in 5-15 minutes if response to initial dose is inadequate. 2 each 1    insulin pen needle (BD MARCIO U/F) 32G X 4 MM miscellaneous Use 1 pen needle daily or as directed. 100 each 1    multivitamin w/minerals (THERA-VIT-M) tablet Take 1 tablet by mouth daily (Patient taking differently: Take 1 tablet by mouth every morning) 30 tablet 11    nicotine (NICODERM CQ) 14 MG/24HR 24 hr patch Place 1 patch onto the skin every 24 hours       nicotine (NICORETTE) 4 MG lozenge Place 1 lozenge (4 mg) inside cheek every hour as needed for smoking cessation 100 lozenge 0    nicotine (NICOTROL) 10 MG inhaler Use 1 cartridge as needed for urge to smoke by puffing over course of 20min.  Use 6-16 cart/day; reduce number of cart/day over 6-12 weeks. 160 each 0    semaglutide (OZEMPIC, 0.25 OR 0.5 MG/DOSE,) 2 MG/3ML pen Inject 0.5 mg Subcutaneous once a week 3 mL 11    STATIN NOT PRESCRIBED (INTENTIONAL) Please choose reason not prescribed from choices below.         Surgical History  Past Surgical History:   Procedure Laterality Date    2/8/16                R thoracotomy, RLL lobectomy (Dr. Cunha). Adenocarcinoma, 1.1 cm, assoicated with atypical adenomatous hyperplasia Right 02/08/2016    R thoracotomy, RLL lobectomy (Dr. Cunha). Adenocarcinoma, 1.1 cm, assoicated with atypical adenomatous hyperplasia    BRONCHOSCOPY, WITH BIOPSY, ROBOT ASSISTED N/A 7/19/2023    Procedure: robot assisted Ion BRONCHOSCOPY, WITH BIOPSY;  Surgeon: Patria Garcia MD;  Location:  OR    ENDOBRONCHIAL ULTRASOUND FLEXIBLE N/A 7/19/2023    Procedure: Endobronchial ultrasound flexible;  Surgeon: Patria Garcia MD;  Location:  OR    ESOPHAGOSCOPY, GASTROSCOPY, DUODENOSCOPY (EGD), COMBINED N/A 8/16/2022    Procedure: ESOPHAGOGASTRODUODENOSCOPY (EGD);  Surgeon: Travis Briseno MD;  Location: [facility]    ESOPHAGOSCOPY, GASTROSCOPY, DUODENOSCOPY (EGD), COMBINED N/A 8/8/2023    Procedure: ESOPHAGOGASTRODUODENOSCOPY, WITH BIOPSY;  Surgeon: Chao Rodrigues DO;  Location:  GI    ORTHOPEDIC SURGERY         Family History  Family History   Problem Relation Age of Onset    Thyroid Disease Mother     Cerebrovascular Disease Mother     Hypertension Mother     Hypertension Father     Glaucoma Father     Cancer Sister     Lung Cancer Sister     Diabetes Brother     Cancer Brother     Diabetes Brother     Cancer Brother     Diabetes Brother     Deep Vein Thrombosis (DVT) Daughter     Depression  "Daughter     Alcohol/Drug Other         self    Diabetes Other         self    Thyroid Disease Other         self    Asthma Other         self    Macular Degeneration No family hx of     Anesthesia Reaction No family hx of        Social History  Social History     Tobacco Use    Smoking status: Former     Packs/day: 0.25     Types: Cigarettes    Smokeless tobacco: Never    Tobacco comments:     01/02/2023 Patient using 14 mg patch, wants to have prescription for Nicotrol inhaler, took workbook   Substance Use Topics    Alcohol use: Not Currently    Drug use: No       Vitals  BP (!) 149/74 (BP Location: Right arm)   Pulse 92   Temp 97  F (36.1  C) (Temporal)   Ht 1.651 m (5' 5\")   Wt 92.5 kg (204 lb)   SpO2 97%   BMI 33.95 kg/m      Physical Exam  Constitutional: Awake, alert, cooperative, no apparent distress  Eyes: see recent clinic note  HENT: No significant facial asymmetry visualized  Respiratory: (+) rhonchi, decreased breath sounds and wheezes   Cardiovascular: pulses palpable   GI: Nontender, nondistended. Soft  Lymph/Hematologic: No visualized bruising  Skin: Warm and dry.   Musculoskeletal: Moves all extremities equally  Neurologic: Awake, alert, oriented to name, place and time.   Neuropsychiatric: Calm, cooperative. Normal affect.     Prior labs/Imaging  All labs and imaging personally reviewed, and prior patient records and results personally reviewed, which also includes outside records available through care everywhere  Lab on 08/23/2023   Component Date Value Ref Range Status    Sodium 08/23/2023 141  136 - 145 mmol/L Final    Potassium 08/23/2023 3.8  3.4 - 5.3 mmol/L Final    Chloride 08/23/2023 102  98 - 107 mmol/L Final    Carbon Dioxide (CO2) 08/23/2023 31 (H)  22 - 29 mmol/L Final    Anion Gap 08/23/2023 8  7 - 15 mmol/L Final    Urea Nitrogen 08/23/2023 11.2  8.0 - 23.0 mg/dL Final    Creatinine 08/23/2023 0.58  0.51 - 0.95 mg/dL Final    Calcium 08/23/2023 9.4  8.8 - 10.2 mg/dL Final    " Glucose 08/23/2023 135 (H)  70 - 99 mg/dL Final    GFR Estimate 08/23/2023 >90  >60 mL/min/1.73m2 Final    Hemoglobin A1C 08/23/2023 7.5 (H)  <5.7 % Final    Normal <5.7%   Prediabetes 5.7-6.4%    Diabetes 6.5% or higher     Note: Adopted from ADA consensus guidelines.    Creatinine Urine mg/dL 08/23/2023 58.2  mg/dL Final    The reference ranges have not been established in urine creatinine. The results should be integrated into the clinical context for interpretation.    Albumin Urine mg/L 08/23/2023 158.0  mg/L Final    The reference ranges have not been established in urine albumin. The results should be integrated into the clinical context for interpretation.    Albumin Urine mg/g Cr 08/23/2023 271.48 (H)  0.00 - 25.00 mg/g Cr Final    Microalbuminuria is defined as an albumin:creatinine ratio of 17 to 299 for males and 25 to 299 for females. A ratio of albumin:creatinine of 300 or higher is indicative of overt proteinuria.  Due to biologic variability, positive results should be confirmed by a second, first-morning random or 24-hour timed urine specimen. If there is discrepancy, a third specimen is recommended. When 2 out of 3 results are in the microalbuminuria range, this is evidence for incipient nephropathy and warrants increased efforts at glucose control, blood pressure control, and institution of therapy with an angiotensin-converting-enzyme (ACE) inhibitor (if the patient can tolerate it).      Vitamin B12 08/23/2023 415  232 - 1,245 pg/mL Final        Assessment and Plan    Combined form of age-related Cataract, LEFT eye    R/b/a of cataract extraction with intraocular lens insertion discussed with patient, patient expressed understanding and would like to proceed; if cleared by anesthesiology, will proceed with surgery today      Re Keita MD    Comprehensive Ophthalmologist  [facility]  Department of Ophthalmology and Visual Neurosciences    Attending Physician  Attestation:  Today with Jenn Markus, I reviewed the indications, risks, benefits, and alternatives of the proposed surgical procedure including, but not limited to, failure obtain the desired result  and need for additional surgery, bleeding, infection, loss of vision, loss of the eye, and the remote possibility of permanent damage to any organ system or death with the use of anesthesia.  I provided multiple opportunities for the questions, answered all questions to the best of my ability, and confirmed that my answers and my discussion were understood. -Re Ketia MD

## 2023-08-24 NOTE — BRIEF OP NOTE
[facility] Brief Operative Note    Pre-operative diagnosis: Combined forms of age-related cataract of left eye   Post-operative diagnosis Combined forms of age-related cataract of left eye   Procedure: Procedure(s):  LEFT EYE PHACOEMULSIFICATION, CATARACT, WITH INTRAOCULAR LENS IMPLANT   Surgeon(s): Surgeon(s) and Role:     * Re Keita MD - Primary   Estimated blood loss: * No values recorded between 8/24/2023  7:27 AM and 8/24/2023  7:53 AM *    Specimens: * No specimens in log *   Findings: Left eye cataract

## 2023-08-24 NOTE — ANESTHESIA POSTPROCEDURE EVALUATION
Patient: Jenn Aparicio    Procedure: Procedure(s):  LEFT EYE PHACOEMULSIFICATION, CATARACT, WITH INTRAOCULAR LENS IMPLANT       Anesthesia Type:  MAC    Note:  Disposition: Outpatient   Postop Pain Control: Uneventful            Sign Out: Well controlled pain   PONV: No   Neuro/Psych: Uneventful            Sign Out: Acceptable/Baseline neuro status   Airway/Respiratory: Uneventful            Sign Out: Acceptable/Baseline resp. status   CV/Hemodynamics: Uneventful            Sign Out: Acceptable CV status; No obvious hypovolemia; No obvious fluid overload   Other NRE: NONE   DID A NON-ROUTINE EVENT OCCUR? No           Last vitals:  Vitals Value Taken Time   /63 08/24/23 0820   Temp 36.7  C (98  F) 08/24/23 0820   Pulse     Resp 14 08/24/23 0820   SpO2 96 % 08/24/23 0820       Electronically Signed By: Jackie Mobley MD  August 24, 2023  9:30 AM

## 2023-08-24 NOTE — OP NOTE
PREOPERATIVE DIAGNOSIS:   Combined form of age-related cataract, Left eye   POSTOPERATIVE DIAGNOSIS:   Combined form of age-related cataract,  Left eye  PROCEDURES: Cataract extraction with intraocular lens implant Left eye.  SURGEON: Re Keita M.D.  ASSISTANT: none  CDE: 17.12  INDICATIONS: The patient Jenn Aparicio presented to the eye clinic with decreased vision secondary to cataract in the Left eye. The risks, benefits and alternatives to cataract extraction were discussed. The patient elected to proceed. All questions were answered to the patient's satisfaction.     DESCRIPTION OF PROCEDURE: Prior to the procedure, appropriate cardiac and respiratory monitors were applied to the patient.  In the pre-operative holding area, a drop of topical proparacaine 0.5% followed by three rounds of cyclopentolate 1%-tropicamide 1%-phenylephrine 2.5%,  by five minutes apart, were instilled into the procedure eye.  Patient also was given one dose of IV Diamox 500 mg due to have a shallow anterior chamber in order to help deepen it for cataract surgery, approx 30 min prior to cataract surgery. The patient was brought to the operating room where a surgical pause was carried out to identify with all members of the surgical team the correct surgical site.      With adequate anesthesia, the Left eye was prepped and draped in the usual sterile fashion for ophthalmic surgery. A lid speculum was placed, and the operating microscope was rotated into position. The surgeon was seated temporally. A paracentesis was created at the limbus with the surgeon's non dominant hand.  Through this limbal paracentesis, the anterior chamber was filled with preservative-free lidocaine, followed by preservative free epinephrine-BSS, followed by dispersive viscoelastic.  Next, the main wound was created via a clear corneal incision with the surgeon's dominant hand using a 2.5 mm keratome blade. A capsulorrhexis was initiated using a  25-gauge bent needle, Cystotome, and a continuous curvilinear capsulorhexis was completed in a circular fashion using the Utrata forceps. Following the capsulorhexis, some of the OVD was egressed from the main wound, and then the lens nucleus was carefully hydrodissected using balanced salt solution on an AC cannula.  The lens nucleus was rotated and removed using phacoemulsification in a stop and chop technique.  Residual cortical material was removed using irrigation-aspiration.  The capsular bag was then filled with cohesive viscoelastic.  A +25.0 diopter [id number] lens was inserted into the capsular bag.  The lens power selected was reviewed using the intraocular lens power measurements that were obtained preoperatively to confirm that the correct lens was selected for the desired post-operative refractive state. After lens insertion, the residual viscoelastic was removed in its entirety with the I/A handpiece. The wounds were hydrated with balanced salt solution and found to be self-sealing. Preservative free intracameral moxifloxacin was administered. With a weck-angely spear the wounds were checked and no leaks were noted. The intraocular pressure was noted to be within the normal range to digital palpation. Subconjunctival dexamethasone (2mg) was injected superiorly.    The lid speculum was removed, one drop of timolol 0.5% and a small ribbon of maxitrol ophthalmic ointment was instilled in the operative eye. An eye shield was carefully placed over the operative eye. The patient tolerated the procedure well without any complications.    PLAN: The patient will be discharged to home and will follow up for post operative visit as scheduled. Counseled return precautions  EBL:  None  COMPLICATIONS:  None  Implant Name Type Inv. Item Serial No.  Lot No. LRB No. Used Action   LENS [id number].250 CLAREON UV ASPHERIC BICONVEX IOL - [id number] Lens/Eye Implant LENS [id number].250 CLAREON UV ASPHERIC BICONVEX IOL  [id number] [employer]  Left 1 Implanted

## 2023-08-25 ENCOUNTER — OFFICE VISIT (OUTPATIENT)
Dept: OPHTHALMOLOGY | Facility: CLINIC | Age: 68
End: 2023-08-25
Attending: STUDENT IN AN ORGANIZED HEALTH CARE EDUCATION/TRAINING PROGRAM
Payer: COMMERCIAL

## 2023-08-25 DIAGNOSIS — Z96.1 PSEUDOPHAKIA, LEFT EYE: ICD-10-CM

## 2023-08-25 DIAGNOSIS — Z98.890 POSTOPERATIVE EYE STATE: Primary | ICD-10-CM

## 2023-08-25 PROCEDURE — 99024 POSTOP FOLLOW-UP VISIT: CPT | Performed by: STUDENT IN AN ORGANIZED HEALTH CARE EDUCATION/TRAINING PROGRAM

## 2023-08-25 PROCEDURE — G0463 HOSPITAL OUTPT CLINIC VISIT: HCPCS | Performed by: STUDENT IN AN ORGANIZED HEALTH CARE EDUCATION/TRAINING PROGRAM

## 2023-08-25 RX ORDER — ERYTHROMYCIN 5 MG/G
0.5 OINTMENT OPHTHALMIC AT BEDTIME
Qty: 3.5 G | Refills: 3 | Status: SHIPPED | OUTPATIENT
Start: 2023-08-25 | End: 2023-09-01

## 2023-08-25 ASSESSMENT — CONF VISUAL FIELD
METHOD: COUNTING FINGERS
OD_INFERIOR_NASAL_RESTRICTION: 0
OD_SUPERIOR_NASAL_RESTRICTION: 0
OS_SUPERIOR_NASAL_RESTRICTION: 0
OS_INFERIOR_TEMPORAL_RESTRICTION: 0
OD_SUPERIOR_TEMPORAL_RESTRICTION: 0
OD_INFERIOR_TEMPORAL_RESTRICTION: 0
OS_NORMAL: 1
OD_NORMAL: 1
OS_INFERIOR_NASAL_RESTRICTION: 0
OS_SUPERIOR_TEMPORAL_RESTRICTION: 0

## 2023-08-25 ASSESSMENT — REFRACTION_WEARINGRX
OS_AXIS: 157
OD_ADD: +2.75
OD_CYLINDER: +1.00
OD_AXIS: 178
SPECS_TYPE: BIFOCAL
OS_SPHERE: -2.00
OS_CYLINDER: +2.00
OS_ADD: +2.75
OD_SPHERE: -1.75

## 2023-08-25 ASSESSMENT — TONOMETRY
OS_IOP_MMHG: 10
OD_IOP_MMHG: 15
IOP_METHOD: ICARE

## 2023-08-25 ASSESSMENT — SLIT LAMP EXAM - LIDS
COMMENTS: WNL
COMMENTS: WNL

## 2023-08-25 ASSESSMENT — VISUAL ACUITY
OS_PH_SC+: -2
OD_PH_CC+: -2
OS_SC: 20/100
OD_PH_CC: 20/25
OS_SC+: -1
OD_CC: 20/50
OS_PH_SC: 20/50
OD_CC+: -2
CORRECTION_TYPE: GLASSES
METHOD: SNELLEN - LINEAR

## 2023-08-25 ASSESSMENT — EXTERNAL EXAM - RIGHT EYE: OD_EXAM: NORMAL

## 2023-08-25 ASSESSMENT — EXTERNAL EXAM - LEFT EYE: OS_EXAM: NORMAL

## 2023-08-25 NOTE — NURSING NOTE
Chief Complaints and History of Present Illnesses   Patient presents with    Post Op (Ophthalmology) Left Eye     Chief Complaint(s) and History of Present Illness(es)       Post Op (Ophthalmology) Left Eye               Comments    Patient states that her LE feels scratchy. She states that her LE is watering. She states that her vision is kind of blurry in the LE, but not as bad.     Ocular Meds:prednisolone QID LE  Vigamox QID LE  Ketorolac QID LE    Kendrick Alejo COT, August 25, 2023 10:22 AM

## 2023-08-25 NOTE — PROGRESS NOTES
HPI    Patient states that her LE feels scratchy. She states that her LE is watering. She states that her vision is kind of blurry in the LE, but not as bad.     Ocular Meds:prednisolone QID LE  Vigamox QID LE  Ketorolac QID LE    Kendrick Alejo COT, August 25, 2023 10:22 AM        Last edited by Kendrick Alejo on 8/25/2023 10:24 AM.          Review of systems for the eyes was negative other than the pertinent positives/negatives listed in the HPI.    Ocular Meds: systane TID OU; olopatadine daily OU; postop drops as instructed    Ocular Hx: refractive error OU; anatomically narrow angles OU; CE/IOL left eye 8/24/23    FOHx: no family history of glaucoma or blindness    PMHx:     Past Medical History:   Diagnosis Date    Adenocarcinoma, lung (H)     Asthma     Ectopic pregnancy     Esophageal reflux     Pulmonary emphysema (H)     Very severe FEV1<30% predicted    Type II diabetes mellitus (H)        Assessment & Plan     Jenn Aparicio is a 68 year old female with the following diagnoses:    Postoperative eye state  Pseudophakia OS  - Doing well, IOP wnl, dedra negative, no signs of infection, corneal edema as expected  - Keep eye shield in place at night for 7 days  - post-operative drops and taper according to instructions  -- vigamox QID x 1 week to surgical eye  -- predforte QID/TID/BID/daily/stop to procedure eye, taper weekly  -- ketorolac QID/TID/BID/daily/stop to procedure eye, taper weekly  - Post-operative do's and don'ts reviewed, questions answered  - patient has emergency contact information  Counseled endophthalmitis/RD/return precautions    Combined forms of age-related cataract OD  - visually significant and affecting ADLs,  - patient is right eye dominant  - already scheduled for surgery    Anatomically Narrow Angles OU  - IOP wnl, CVF full to CF OU  - no FHx of glaucoma  - on gonio undilated with 4-mirror has steep approach of iris with angles open to PTM/SS most areas, and opens on dynamic gonioscopy  OU; previously dilated recently without issue  - will gonio at post op month 1 visit after cataract surgery OU    T2DM without Retinopathy OU  - strict BP/BG control  - patient understands importance  of good blood glucose control in order to reduce the risk of developing diabetic retinopathy  - yearly DFE    Dry Eyes OU  - PFATs QID and prn OU    Myopia of both eyes with astigmatism and presbyopia  - hold on refraction until after cataract surgery    Counseled return/RD precautions    Patient disposition:   Return for Follow Up for post op cataract surgery visit, or sooner changes.    Attending Physician Attestation:  Complete documentation of historical and exam elements from today's encounter can be found in the full encounter summary report (not reduplicated in this progress note).  I personally obtained the chief complaint(s) and history of present illness.  I confirmed and edited as necessary the review of systems, past medical/surgical history, family history, social history, and examination findings as documented by others; and I examined the patient myself.  I personally reviewed the relevant tests, images, and reports as documented above.  I formulated and edited as necessary the assessment and plan and discussed the findings and management plan with the patient and family. . - Re Keita MD

## 2023-08-28 ENCOUNTER — PATIENT OUTREACH (OUTPATIENT)
Dept: GASTROENTEROLOGY | Facility: CLINIC | Age: 68
End: 2023-08-28
Payer: COMMERCIAL

## 2023-08-28 NOTE — TELEPHONE ENCOUNTER
Left message with call back number , to offer follow up with Gabriela Damon [phone]  Jenn also needs to complete video swallow, will discuss scheduling.

## 2023-08-29 ENCOUNTER — ANESTHESIA EVENT (OUTPATIENT)
Dept: SURGERY | Facility: AMBULATORY SURGERY CENTER | Age: 68
End: 2023-08-29
Payer: COMMERCIAL

## 2023-08-29 NOTE — TELEPHONE ENCOUNTER
Spoke with Jenn Barajas states wants to wait on video swallow/would like to discuss with provider.     Offered and accepted visit with Gabriela Damon on 9/18 at 2pm in person. Requested a reminder letter be sent.   Routed to Scheduling navigator.

## 2023-08-30 ENCOUNTER — TELEPHONE (OUTPATIENT)
Dept: INTERNAL MEDICINE | Facility: CLINIC | Age: 68
End: 2023-08-30
Payer: COMMERCIAL

## 2023-08-31 DIAGNOSIS — E11.65 TYPE 2 DIABETES MELLITUS WITH HYPERGLYCEMIA, WITHOUT LONG-TERM CURRENT USE OF INSULIN (H): ICD-10-CM

## 2023-08-31 NOTE — TELEPHONE ENCOUNTER
empagliflozin (JARDIANCE) 25 MG TABS tablet     Pharmacy asking for a whole year supply  But Per Protocol only can fill for 6 months  Please review and fill per Provider recommendations for Pt care.      Leona Chamberlain RN  Central Triage Red Flags/Med Refills

## 2023-08-31 NOTE — TELEPHONE ENCOUNTER
[facility] Call Center    Phone Message    May a detailed message be left on voicemail: yes     Reason for Call: Medication Refill Request    Has the patient contacted the pharmacy for the refill? Yes   Name of medication being requested: empagliflozin (JARDIANCE) 25 MG TABS tablet [[id number]]   Provider who prescribed the medication: Dr Shabazz  Pharmacy: [facility] - [address]  Date medication is needed: asap    Pharmacy is requesting 90 day supply with 3 refills for home delivery       Action Taken: Other: endo    Travel Screening: Not Applicable

## 2023-09-01 ENCOUNTER — OFFICE VISIT (OUTPATIENT)
Dept: OPHTHALMOLOGY | Facility: CLINIC | Age: 68
End: 2023-09-01
Attending: STUDENT IN AN ORGANIZED HEALTH CARE EDUCATION/TRAINING PROGRAM
Payer: COMMERCIAL

## 2023-09-01 DIAGNOSIS — Z98.890 POSTOPERATIVE EYE STATE: Primary | ICD-10-CM

## 2023-09-01 DIAGNOSIS — Z96.1 PSEUDOPHAKIA, LEFT EYE: ICD-10-CM

## 2023-09-01 PROCEDURE — G0463 HOSPITAL OUTPT CLINIC VISIT: HCPCS | Performed by: STUDENT IN AN ORGANIZED HEALTH CARE EDUCATION/TRAINING PROGRAM

## 2023-09-01 PROCEDURE — 99024 POSTOP FOLLOW-UP VISIT: CPT | Performed by: STUDENT IN AN ORGANIZED HEALTH CARE EDUCATION/TRAINING PROGRAM

## 2023-09-01 ASSESSMENT — CONF VISUAL FIELD
OS_SUPERIOR_NASAL_RESTRICTION: 0
OD_INFERIOR_TEMPORAL_RESTRICTION: 0
OS_NORMAL: 1
OD_INFERIOR_NASAL_RESTRICTION: 0
OS_SUPERIOR_TEMPORAL_RESTRICTION: 0
OD_NORMAL: 1
OD_SUPERIOR_NASAL_RESTRICTION: 0
OS_INFERIOR_TEMPORAL_RESTRICTION: 0
OS_INFERIOR_NASAL_RESTRICTION: 0
METHOD: COUNTING FINGERS
OD_SUPERIOR_TEMPORAL_RESTRICTION: 0

## 2023-09-01 ASSESSMENT — VISUAL ACUITY
OS_PH_SC+: +2
OD_PH_CC: 20/30
OD_CC: 20/70
METHOD: SNELLEN - LINEAR
OS_PH_SC: 20/30
OS_SC: 20/30
OS_SC+: -2
OD_PH_CC+: -2

## 2023-09-01 ASSESSMENT — TONOMETRY
IOP_METHOD: TONOPEN
OS_IOP_MMHG: 13
OD_IOP_MMHG: 18

## 2023-09-01 ASSESSMENT — SLIT LAMP EXAM - LIDS
COMMENTS: WNL
COMMENTS: WNL

## 2023-09-01 ASSESSMENT — EXTERNAL EXAM - LEFT EYE: OS_EXAM: NORMAL

## 2023-09-01 ASSESSMENT — EXTERNAL EXAM - RIGHT EYE: OD_EXAM: NORMAL

## 2023-09-01 NOTE — NURSING NOTE
Chief Complaints and History of Present Illnesses   Patient presents with    Post Op (Ophthalmology) Left Eye     Chief Complaint(s) and History of Present Illness(es)       Post Op (Ophthalmology) Left Eye              Laterality: left eye    Associated symptoms: eye pain.  Negative for dryness    Pain scale: 10/10              Comments    Jenn is here one week post LE cataract surgery with IOL implant. She says she has had pain LE and light sensitivity that has been gradually getting worse since surgery.     Saul Regalado COT 10:30 AM September 1, 2023

## 2023-09-01 NOTE — PROGRESS NOTES
HPI       Post Op (Ophthalmology) Left Eye    In left eye.  Associated symptoms include eye pain.  Negative for dryness.  Pain was noted as 10/10.             Comments    Jenn is here one week post LE cataract surgery with IOL implant. She says she has had pain LE and light sensitivity that has been gradually getting worse since stopped using eye drops two days ago. No new flashes, floaters, or curtain down visual field.    Saul Regalado COT 10:30 AM September 1, 2023             Last edited by Re Keita MD on 9/3/2023 11:55 AM.            Review of systems for the eyes was negative other than the pertinent positives/negatives listed in the HPI.    Ocular Meds: systane TID OU; olopatadine daily OU    Ocular Hx: refractive error OU; anatomically narrow angles OU; CE/IOL left eye 8/24/23    FOHx: no family history of glaucoma or blindness    PMHx:     Past Medical History:   Diagnosis Date    Adenocarcinoma, lung (H)     Asthma     Ectopic pregnancy     Esophageal reflux     Pulmonary emphysema (H)     Very severe FEV1<30% predicted    Type II diabetes mellitus (H)        Assessment & Plan     Jenn Aparicio is a 68 year old female with the following diagnoses:    Postoperative eye state  Pseudophakia OS  - Doing well, IOP wnl, dedra negative, no signs of infection, happy with results  - mild AC reaction, stopped drops 2 days ago  - discontinue eye shield  - post-operative drops and taper according to instructions  -- discontinue vigamox  -- predforte QID/TID/BID/daily/stop to procedure eye, taper weekly  -- ketorolac QID/TID/BID/daily/stop to procedure eye, taper weekly  - Post-operative do's and don'ts reviewed, questions answered  - patient has emergency contact information  - reviewed importance of medication compliance with patient  Counseled endophthalmitis/RD/return precautions    Combined forms of age-related cataract OD  - visually significant and affecting ADLs,  - patient is right eye dominant  -  already scheduled for surgery    Anatomically Narrow Angles OU  - IOP wnl, CVF full to CF OU  - no FHx of glaucoma  - on gonio undilated with 4-mirror has steep approach of iris with angles open to PTM/SS most areas, and opens on dynamic gonioscopy OU; previously dilated recently without issue  - will gonio at post op month 1 visit after cataract surgery OU    T2DM without Retinopathy OU  - strict BP/BG control  - patient understands importance  of good blood glucose control in order to reduce the risk of developing diabetic retinopathy  - yearly DFE    Dry Eyes OU  - PFATs QID and prn OU    Refractive error  - hold on refraction until POM1 visit after cataract surgery    Counseled return/RD precautions    Patient disposition:   Return for Follow Up for post op cataract surgery visit, or sooner changes.    Attending Physician Attestation:  Complete documentation of historical and exam elements from today's encounter can be found in the full encounter summary report (not reduplicated in this progress note).  I personally obtained the chief complaint(s) and history of present illness.  I confirmed and edited as necessary the review of systems, past medical/surgical history, family history, social history, and examination findings as documented by others; and I examined the patient myself.  I personally reviewed the relevant tests, images, and reports as documented above.  I formulated and edited as necessary the assessment and plan and discussed the findings and management plan with the patient and family. Today with Jenn Aparicio, I reviewed the indications, risks, benefits, and alternatives of the proposed surgical procedure including, but not limited to, failure obtain the desired result  and need for additional surgery, bleeding, infection, loss of vision, loss of the eye, and the remote possibility of permanent damage to any organ system or death with the use of anesthesia.  I provided multiple opportunities for  the questions, answered all questions to the best of my ability, and confirmed that my answers and my discussion were understood. - Re Keita MD

## 2023-09-03 ASSESSMENT — CUP TO DISC RATIO: OS_RATIO: ~0.4

## 2023-09-03 ASSESSMENT — REFRACTION_MANIFEST
OS_SPHERE: -0.25
OS_CYLINDER: +1.00
OS_AXIS: 155
OS_ADD: +2.25

## 2023-09-05 ENCOUNTER — HOSPITAL ENCOUNTER (OUTPATIENT)
Facility: AMBULATORY SURGERY CENTER | Age: 68
Discharge: HOME | End: 2023-09-05
Attending: STUDENT IN AN ORGANIZED HEALTH CARE EDUCATION/TRAINING PROGRAM
Payer: COMMERCIAL

## 2023-09-05 ENCOUNTER — ANESTHESIA (OUTPATIENT)
Dept: SURGERY | Facility: AMBULATORY SURGERY CENTER | Age: 68
End: 2023-09-05
Payer: COMMERCIAL

## 2023-09-05 VITALS
HEIGHT: 65 IN | HEART RATE: 91 BPM | SYSTOLIC BLOOD PRESSURE: 138 MMHG | RESPIRATION RATE: 14 BRPM | WEIGHT: 204 LBS | TEMPERATURE: 96.9 F | BODY MASS INDEX: 33.99 KG/M2 | OXYGEN SATURATION: 97 % | DIASTOLIC BLOOD PRESSURE: 71 MMHG

## 2023-09-05 DIAGNOSIS — H25.813 COMBINED FORMS OF AGE-RELATED CATARACT OF BOTH EYES: Primary | ICD-10-CM

## 2023-09-05 LAB — GLUCOSE BLDC GLUCOMTR-MCNC: 126 MG/DL (ref 70–99)

## 2023-09-05 PROCEDURE — 66984 XCAPSL CTRC RMVL W/O ECP: CPT | Mod: RT

## 2023-09-05 PROCEDURE — 82962 GLUCOSE BLOOD TEST: CPT | Performed by: PATHOLOGY

## 2023-09-05 DEVICE — LENS CC60WF 23.0 CLAREON UV ASPHERIC BICONVEX IOL: Type: IMPLANTABLE DEVICE | Site: EYE | Status: FUNCTIONAL

## 2023-09-05 RX ORDER — TETRACAINE HYDROCHLORIDE 5 MG/ML
SOLUTION OPHTHALMIC PRN
Status: DISCONTINUED | OUTPATIENT
Start: 2023-09-05 | End: 2023-09-05 | Stop reason: HOSPADM

## 2023-09-05 RX ORDER — FENTANYL CITRATE 50 UG/ML
25 INJECTION, SOLUTION INTRAMUSCULAR; INTRAVENOUS EVERY 5 MIN PRN
Status: DISCONTINUED | OUTPATIENT
Start: 2023-09-05 | End: 2023-09-05 | Stop reason: HOSPADM

## 2023-09-05 RX ORDER — ONDANSETRON 2 MG/ML
4 INJECTION INTRAMUSCULAR; INTRAVENOUS EVERY 30 MIN PRN
Status: DISCONTINUED | OUTPATIENT
Start: 2023-09-05 | End: 2023-09-05 | Stop reason: HOSPADM

## 2023-09-05 RX ORDER — DICLOFENAC SODIUM 1 MG/ML
1 SOLUTION/ DROPS OPHTHALMIC
Status: COMPLETED | OUTPATIENT
Start: 2023-09-05 | End: 2023-09-05

## 2023-09-05 RX ORDER — FENTANYL CITRATE 50 UG/ML
INJECTION, SOLUTION INTRAMUSCULAR; INTRAVENOUS PRN
Status: DISCONTINUED | OUTPATIENT
Start: 2023-09-05 | End: 2023-09-05

## 2023-09-05 RX ORDER — LIDOCAINE 40 MG/G
CREAM TOPICAL
Status: DISCONTINUED | OUTPATIENT
Start: 2023-09-05 | End: 2023-09-05 | Stop reason: HOSPADM

## 2023-09-05 RX ORDER — DEXAMETHASONE SODIUM PHOSPHATE 4 MG/ML
INJECTION, SOLUTION INTRA-ARTICULAR; INTRALESIONAL; INTRAMUSCULAR; INTRAVENOUS; SOFT TISSUE PRN
Status: DISCONTINUED | OUTPATIENT
Start: 2023-09-05 | End: 2023-09-05 | Stop reason: HOSPADM

## 2023-09-05 RX ORDER — KETOROLAC TROMETHAMINE 5 MG/ML
1 SOLUTION OPHTHALMIC 4 TIMES DAILY
Qty: 5 ML | Refills: 0 | Status: SHIPPED | OUTPATIENT
Start: 2023-09-05 | End: 2023-10-12

## 2023-09-05 RX ORDER — MOXIFLOXACIN 5 MG/ML
1 SOLUTION/ DROPS OPHTHALMIC
Status: COMPLETED | OUTPATIENT
Start: 2023-09-05 | End: 2023-09-05

## 2023-09-05 RX ORDER — BALANCED SALT SOLUTION 6.4; .75; .48; .3; 3.9; 1.7 MG/ML; MG/ML; MG/ML; MG/ML; MG/ML; MG/ML
SOLUTION OPHTHALMIC PRN
Status: DISCONTINUED | OUTPATIENT
Start: 2023-09-05 | End: 2023-09-05 | Stop reason: HOSPADM

## 2023-09-05 RX ORDER — CYCLOPENTOLAT/TROPIC/PHENYLEPH 1%-1%-2.5%
1 DROPS (EA) OPHTHALMIC (EYE)
Status: COMPLETED | OUTPATIENT
Start: 2023-09-05 | End: 2023-09-05

## 2023-09-05 RX ORDER — HYDROMORPHONE HYDROCHLORIDE 1 MG/ML
0.4 INJECTION, SOLUTION INTRAMUSCULAR; INTRAVENOUS; SUBCUTANEOUS EVERY 5 MIN PRN
Status: DISCONTINUED | OUTPATIENT
Start: 2023-09-05 | End: 2023-09-05 | Stop reason: HOSPADM

## 2023-09-05 RX ORDER — SODIUM CHLORIDE, SODIUM LACTATE, POTASSIUM CHLORIDE, CALCIUM CHLORIDE 600; 310; 30; 20 MG/100ML; MG/100ML; MG/100ML; MG/100ML
INJECTION, SOLUTION INTRAVENOUS CONTINUOUS
Status: DISCONTINUED | OUTPATIENT
Start: 2023-09-05 | End: 2023-09-05 | Stop reason: HOSPADM

## 2023-09-05 RX ORDER — OXYCODONE HYDROCHLORIDE 5 MG/1
10 TABLET ORAL
Status: DISCONTINUED | OUTPATIENT
Start: 2023-09-05 | End: 2023-09-06 | Stop reason: HOSPADM

## 2023-09-05 RX ORDER — HYDROMORPHONE HYDROCHLORIDE 1 MG/ML
0.2 INJECTION, SOLUTION INTRAMUSCULAR; INTRAVENOUS; SUBCUTANEOUS EVERY 5 MIN PRN
Status: DISCONTINUED | OUTPATIENT
Start: 2023-09-05 | End: 2023-09-05 | Stop reason: HOSPADM

## 2023-09-05 RX ORDER — PROPARACAINE HYDROCHLORIDE 5 MG/ML
1 SOLUTION/ DROPS OPHTHALMIC ONCE
Status: COMPLETED | OUTPATIENT
Start: 2023-09-05 | End: 2023-09-05

## 2023-09-05 RX ORDER — SODIUM CHLORIDE, SODIUM LACTATE, POTASSIUM CHLORIDE, CALCIUM CHLORIDE 600; 310; 30; 20 MG/100ML; MG/100ML; MG/100ML; MG/100ML
INJECTION, SOLUTION INTRAVENOUS CONTINUOUS
Status: DISCONTINUED | OUTPATIENT
Start: 2023-09-05 | End: 2023-09-06 | Stop reason: HOSPADM

## 2023-09-05 RX ORDER — PREDNISOLONE ACETATE 10 MG/ML
1 SUSPENSION/ DROPS OPHTHALMIC 4 TIMES DAILY
Qty: 5 ML | Refills: 0 | Status: SHIPPED | OUTPATIENT
Start: 2023-09-05 | End: 2023-10-12

## 2023-09-05 RX ORDER — FENTANYL CITRATE 50 UG/ML
50 INJECTION, SOLUTION INTRAMUSCULAR; INTRAVENOUS EVERY 5 MIN PRN
Status: DISCONTINUED | OUTPATIENT
Start: 2023-09-05 | End: 2023-09-05 | Stop reason: HOSPADM

## 2023-09-05 RX ORDER — MOXIFLOXACIN IN NACL,ISO-OS/PF 0.3MG/0.3
SYRINGE (ML) INTRAOCULAR PRN
Status: DISCONTINUED | OUTPATIENT
Start: 2023-09-05 | End: 2023-09-05 | Stop reason: HOSPADM

## 2023-09-05 RX ORDER — TIMOLOL MALEATE 5 MG/ML
SOLUTION/ DROPS OPHTHALMIC PRN
Status: DISCONTINUED | OUTPATIENT
Start: 2023-09-05 | End: 2023-09-05 | Stop reason: HOSPADM

## 2023-09-05 RX ORDER — ONDANSETRON 2 MG/ML
INJECTION INTRAMUSCULAR; INTRAVENOUS PRN
Status: DISCONTINUED | OUTPATIENT
Start: 2023-09-05 | End: 2023-09-05

## 2023-09-05 RX ORDER — ONDANSETRON 4 MG/1
4 TABLET, ORALLY DISINTEGRATING ORAL EVERY 30 MIN PRN
Status: DISCONTINUED | OUTPATIENT
Start: 2023-09-05 | End: 2023-09-06 | Stop reason: HOSPADM

## 2023-09-05 RX ORDER — LIDOCAINE HYDROCHLORIDE 10 MG/ML
INJECTION, SOLUTION EPIDURAL; INFILTRATION; INTRACAUDAL; PERINEURAL PRN
Status: DISCONTINUED | OUTPATIENT
Start: 2023-09-05 | End: 2023-09-05 | Stop reason: HOSPADM

## 2023-09-05 RX ORDER — ONDANSETRON 2 MG/ML
4 INJECTION INTRAMUSCULAR; INTRAVENOUS EVERY 30 MIN PRN
Status: DISCONTINUED | OUTPATIENT
Start: 2023-09-05 | End: 2023-09-06 | Stop reason: HOSPADM

## 2023-09-05 RX ORDER — OXYCODONE HYDROCHLORIDE 5 MG/1
5 TABLET ORAL
Status: DISCONTINUED | OUTPATIENT
Start: 2023-09-05 | End: 2023-09-06 | Stop reason: HOSPADM

## 2023-09-05 RX ORDER — ACETAZOLAMIDE 500 MG/5ML
500 INJECTION, POWDER, LYOPHILIZED, FOR SOLUTION INTRAVENOUS ONCE
Status: DISCONTINUED | OUTPATIENT
Start: 2023-09-05 | End: 2023-09-05 | Stop reason: HOSPADM

## 2023-09-05 RX ORDER — MOXIFLOXACIN 5 MG/ML
1 SOLUTION/ DROPS OPHTHALMIC 4 TIMES DAILY
Qty: 3 ML | Refills: 0 | Status: SHIPPED | OUTPATIENT
Start: 2023-09-05 | End: 2023-10-12

## 2023-09-05 RX ORDER — ONDANSETRON 4 MG/1
4 TABLET, ORALLY DISINTEGRATING ORAL EVERY 30 MIN PRN
Status: DISCONTINUED | OUTPATIENT
Start: 2023-09-05 | End: 2023-09-05 | Stop reason: HOSPADM

## 2023-09-05 RX ADMIN — SODIUM CHLORIDE, SODIUM LACTATE, POTASSIUM CHLORIDE, CALCIUM CHLORIDE: 600; 310; 30; 20 INJECTION, SOLUTION INTRAVENOUS at 11:39

## 2023-09-05 RX ADMIN — DICLOFENAC SODIUM 1 DROP: 1 SOLUTION/ DROPS OPHTHALMIC at 11:33

## 2023-09-05 RX ADMIN — DICLOFENAC SODIUM 1 DROP: 1 SOLUTION/ DROPS OPHTHALMIC at 11:18

## 2023-09-05 RX ADMIN — Medication 1 DROP: at 11:32

## 2023-09-05 RX ADMIN — FENTANYL CITRATE 25 MCG: 50 INJECTION, SOLUTION INTRAMUSCULAR; INTRAVENOUS at 13:22

## 2023-09-05 RX ADMIN — Medication 1 DROP: at 11:26

## 2023-09-05 RX ADMIN — MOXIFLOXACIN 1 DROP: 5 SOLUTION/ DROPS OPHTHALMIC at 11:26

## 2023-09-05 RX ADMIN — MOXIFLOXACIN 1 DROP: 5 SOLUTION/ DROPS OPHTHALMIC at 11:32

## 2023-09-05 RX ADMIN — ONDANSETRON 4 MG: 2 INJECTION INTRAMUSCULAR; INTRAVENOUS at 13:22

## 2023-09-05 RX ADMIN — FENTANYL CITRATE 25 MCG: 50 INJECTION, SOLUTION INTRAMUSCULAR; INTRAVENOUS at 13:32

## 2023-09-05 RX ADMIN — MOXIFLOXACIN 1 DROP: 5 SOLUTION/ DROPS OPHTHALMIC at 11:17

## 2023-09-05 RX ADMIN — DICLOFENAC SODIUM 1 DROP: 1 SOLUTION/ DROPS OPHTHALMIC at 11:26

## 2023-09-05 RX ADMIN — Medication 1 DROP: at 11:17

## 2023-09-05 RX ADMIN — PROPARACAINE HYDROCHLORIDE 1 DROP: 5 SOLUTION/ DROPS OPHTHALMIC at 11:17

## 2023-09-05 ASSESSMENT — ENCOUNTER SYMPTOMS: SEIZURES: 0

## 2023-09-05 ASSESSMENT — COPD QUESTIONNAIRES
CAT_SEVERITY: SEVERE
COPD: 1

## 2023-09-05 ASSESSMENT — LIFESTYLE VARIABLES: TOBACCO_USE: 1

## 2023-09-05 NOTE — BRIEF OP NOTE
[facility] Brief Operative Note    Pre-operative diagnosis: Combined forms of age-related cataract of right eye   Post-operative diagnosis Combined forms of age-related cataract of right eye   Procedure: Procedure(s):  RIGHT EYE PHACOEMULSIFICATION, CATARACT, WITH INTRAOCULAR LENS IMPLANT   Surgeon(s): Surgeon(s) and Role:     * Re Keita MD - Primary   Estimated blood loss: * No values recorded between 9/5/2023  1:31 PM and 9/5/2023  2:00 PM *    Specimens: * No specimens in log *   Findings: Right eye cataract

## 2023-09-05 NOTE — DISCHARGE INSTRUCTIONS
"Cataract Surgery Post-Operative Instructions        You have undergone cataract surgery today. Take it easy as the mild anesthesia wears off.      After cataract surgery you will have an eye shield over your eye and things might be a little blurry. This is normal. It will clear up over the coming days. Your eye may feel a little scratchy and this should get better over the next few days     It is okay to resume your previous diet     Use Tylenol (if there is no contraindication from a medical standpoint) if you experience any eye pain     Avoid sleeping on or putting pressure on the operated eye     Sleep with the eye shield at bedtime for the first week     You may resume light activity tomorrow, but no heavy lifting, no straining, no bending over especially the first week     Do not rub the eyes, no water in the eyes (no pools or hot tubs or tap water); Please wait until tomorrow to shower, and when you do shower take care not to get water in the surgical eye     You can continue the following eye drops starting tonight in the RIGHT eye:     Vigamox (tan cap) 1 drop four times a day to right eye for 1 week, then stop  Ketorolac (grey top) 1 drop four times a day to right eye for 1 week, then 1 drop right times a day to surgical eye for 1 week, then 1 drop two times a day to right eye for 1 week , then 1 drop once a day to right eye for 1 week, then stop  Predforte (pink or white top) 1 drop four times a day to right eye for 1 week, then 1 drop right times a day to surgical eye for 1 week, then 1 drop two times a day to right eye for 1 week , then 1 drop once a day to right eye for 1 week, then stop  OPTIONAL: preservative free artificial tears (refresh, thera tears, systane, etc) 1 drop 4-6 times per day as needed (DO NOT use Visine or Clear Eyes or any eye drop product that states \"red out\")  If on glaucoma medications, then continue regular eye pressure drops  **wait 5-10 minutes between each drop**  **can " "refrigerate eye drops to reduce burning or stinging sensation**     You can continue the following eye drops starting tonight in the LEFT eye:     Ketorolac (grey top) 1 drop three times a day to left eye for 1 week, then 1 drop two times a day to left eye for 1 week , then 1 drop once a day to left eye for 1 week, then stop  Predforte (pink or white top) 1 drop three times a day to left eye for 1 week, then 1 drop two times a day to left eye for 1 week , then 1 drop once a day to left eye for 1 week, then stop  OPTIONAL: preservative free artificial tears (refresh, thera tears, systane, etc) 1 drop 4-6 times per day as needed (DO NOT use Visine or Clear Eyes or any eye drop product that states \"red out\")  If on glaucoma medications, then continue regular eye pressure drops  **wait 5-10 minutes between each drop**  **can refrigerate eye drops to reduce burning or stinging sensation**     Please contact Dr Keita at [phone] if any issues arise     Your follow up appointment is as scheduled on 9/6/2023 at  1:15 PM      [facility] - Department of Ophthalmology  [address] [place] [address], [address] [place] [address] [facility]  Home Care Following Anesthesia  For 24 hours after surgery:  Get plenty of rest.  A responsible adult must stay with you for at least 24 hours after you leave the surgery center.  Do not drive or use heavy equipment.  If you have weakness or tingling, don't drive or use heavy equipment until this feeling goes away.   Do not drink alcohol.   Avoid strenuous or risky activities.  Ask for help when climbing stairs.  You may feel lightheaded.  IF so, sit for a few minutes before standing.  Have someone help you get up.   If you have nausea (feel sick to your stomach): Drink only clear liquids such as apple juice, ginger ale, broth or 7-Up.  Rest may also help.  Be sure to drink enough fluids.  Move to a regular diet as you feel able.   You may have a " slight fever.  Call the doctor if your fever is over 100 F (37.7 C) (taken under the tongue) or lasts longer than 24 hours.  You may have a dry mouth, a sore throat, muscle aches or trouble sleeping. These should go away after 24 hours.  Do not make important or legal decisions.   It is recommended to avoid smoking.               Tips for taking pain medications  To get the best pain relief possible, remember these points:  Take pain medications as directed, before pain becomes severe.  Pain medication can upset your stomach: taking it with food may help.  Constipation is a common side effect of pain medication. Drink plenty of  fluids.  Eat foods high in fiber. Take a stool softener if recommended by your doctor or pharmacist.  Do not drink alcohol, drive or operate machinery while taking pain medications.  Ask about other ways to control pain, such as with heat, ice or relaxation.    Tylenol/Acetaminophen Consumption    If you feel your pain relief is insufficient, you may take Tylenol/Acetaminophen in addition to your narcotic pain medication.   Be careful not to exceed 4,000 mg of Tylenol/Acetaminophen in a 24 hour period from all sources.  If you are taking extra strength Tylenol/acetaminophen (500 mg), the maximum dose is 8 tablets in 24 hours.  If you are taking regular strength acetaminophen (325 mg), the maximum dose is 12 tablets in 24 hours.    Call a doctor for any of the following:  Signs of infection (fever, growing tenderness at the surgery site, a large amount of drainage or bleeding, severe pain, foul-smelling drainage, redness, swelling).  It has been over 8 to 10 hours since surgery and you are still not able to urinate (pass water).  Headache for over 24 hours.  Numbness, tingling or weakness the day after surgery (if you had spinal anesthesia).  Signs of Covid-19 infection (temperature over 100 degrees, shortness of breath, cough, loss of taste/smell, generalized body aches, persistent headache,  chills, sore throat, nausea/vomiting/diarrhea)  Your doctor is:       Dr. Re Keita, Ophthalmology: [phone]               Or dial [phone] and ask for the resident on call for:  Ophthalmology  For emergency care, call the:  [facility]:  [phone] (TTY for hearing impaired: [phone])

## 2023-09-05 NOTE — ANESTHESIA PREPROCEDURE EVALUATION
Anesthesia Pre-Procedure Evaluation    Patient: Jenn Aparicio   MRN: [id number] : 1955        Procedure : Procedure(s):  RIGHT EYE PHACOEMULSIFICATION, CATARACT, WITH INTRAOCULAR LENS IMPLANT          Past Medical History:   Diagnosis Date    Adenocarcinoma, lung (H)     Asthma     Ectopic pregnancy     Esophageal reflux     Pulmonary emphysema (H)     Very severe FEV1<30% predicted    Type II diabetes mellitus (H)       Past Surgical History:   Procedure Laterality Date    16                R thoracotomy, RLL lobectomy (Dr. Cunha). Adenocarcinoma, 1.1 cm, assoicated with atypical adenomatous hyperplasia Right 2016    R thoracotomy, RLL lobectomy (Dr. Cunha). Adenocarcinoma, 1.1 cm, assoicated with atypical adenomatous hyperplasia    BRONCHOSCOPY, WITH BIOPSY, ROBOT ASSISTED N/A 2023    Procedure: robot assisted Ion BRONCHOSCOPY, WITH BIOPSY;  Surgeon: Patria Garcia MD;  Location: [facility] OR    ENDOBRONCHIAL ULTRASOUND FLEXIBLE N/A 2023    Procedure: Endobronchial ultrasound flexible;  Surgeon: Patria Garcia MD;  Location:  OR    ESOPHAGOSCOPY, GASTROSCOPY, DUODENOSCOPY (EGD), COMBINED N/A 2022    Procedure: ESOPHAGOGASTRODUODENOSCOPY (EGD);  Surgeon: Travis Briseno MD;  Location:  GI    ESOPHAGOSCOPY, GASTROSCOPY, DUODENOSCOPY (EGD), COMBINED N/A 2023    Procedure: ESOPHAGOGASTRODUODENOSCOPY, WITH BIOPSY;  Surgeon: Chao Rodrigues DO;  Location:  GI    ORTHOPEDIC SURGERY      PHACOEMULSIFICATION CLEAR CORNEA WITH STANDARD INTRAOCULAR LENS IMPLANT Left 2023    Procedure: LEFT EYE PHACOEMULSIFICATION, CATARACT, WITH INTRAOCULAR LENS IMPLANT;  Surgeon: Re Keita MD;  Location: [facility] OR      Allergies   Allergen Reactions    Bee Venom Anaphylaxis    Interferons Dermatitis    Penicillins Hives    Aspirin      325mg     Colon Care       Social History     Tobacco Use    Smoking status: Former     Packs/day: 0.25     Types: Cigarettes    Smokeless tobacco: Never     Tobacco comments:     01/02/2023 Patient using 14 mg patch, wants to have prescription for Nicotrol inhaler, took workbook   Substance Use Topics    Alcohol use: Not Currently      Wt Readings from Last 1 Encounters:   09/05/23 92.5 kg (204 lb)        Anesthesia Evaluation   Pt has had prior anesthetic.     No history of anesthetic complications       ROS/MED HX  ENT/Pulmonary:     (+) sleep apnea, doesn't use CPAP,              tobacco use, Past use,       severe,  COPD, O2 dependent,             Neurologic:  - neg neurologic ROS  (-) no seizures and no CVA   Cardiovascular:     (+)  hypertension- -   -  - -   Taking blood thinners                              Previous cardiac testing   Echo: Date: 9/2022 Results:  Interpretation Summary  Left ventricular size, wall motion and function are normal. The ejection  fraction is 55-60%.     Right ventricular function, chamber size, wall motion, and thickness are  normal.     No pericardial effusion is present.     The inferior vena cava is normal.     No significant valvular abnormalities present.     There is no prior study for direct comparison.  Stress Test:  Date: Results:    ECG Reviewed:  Date: 2/20/23 Results:  Sinus tachycardia, septal infarct  Cath:  Date: Results:      METS/Exercise Tolerance: 1 - Eating, dressing Comment: Limited activity. Can walk with walker but limited due to [place] for past 2 years.    Hematologic:     (+)       history of blood transfusion, no previous transfusion reaction,     (-) history of blood clots   Musculoskeletal:  - neg musculoskeletal ROS     GI/Hepatic: Comment: Esophageal dysmotility     (+) GERD,    hepatitis resolved      hepatitis type C,        Renal/Genitourinary:  - neg Renal ROS     Endo: Comment: Prednisone on hand, but no recent use    (+)  type II DM, Last HgA1c: 7.5, date: 8/2023, Not using insulin,  Normal glucose range: 50 at night, 150-200,               Psychiatric/Substance Use:  - neg psychiatric ROS      Infectious Disease:  - neg infectious disease ROS     Malignancy:   (+) Malignancy, History of Lung.  Lung CA status post Radiation and Surgery.      Other:            Physical Exam    Airway  airway exam normal      Mallampati: III   TM distance: > 3 FB   Neck ROM: limited   Mouth opening: < 3 cm    Respiratory Devices and Support         Dental     Comment: Only has 3 lower rotted teeth (front)    (+) Multiple visibly decayed, broken teeth      Cardiovascular   cardiovascular exam normal       Rhythm and rate: regular     Pulmonary           (+) rhonchi, decreased breath sounds and wheezes           OUTSIDE LABS:  CBC:   Lab Results   Component Value Date    WBC 6.1 05/25/2023    WBC 8.5 02/24/2023    HGB 13.8 05/25/2023    HGB 13.0 02/24/2023    HCT 45.0 05/25/2023    HCT 43.4 02/24/2023     05/25/2023     02/24/2023     BMP:   Lab Results   Component Value Date     08/23/2023     05/25/2023    POTASSIUM 3.8 08/23/2023    POTASSIUM 4.0 05/25/2023    CHLORIDE 102 08/23/2023    CHLORIDE 105 05/25/2023    CO2 31 (H) 08/23/2023    CO2 32 (H) 05/25/2023    BUN 11.2 08/23/2023    BUN 8.1 05/25/2023    CR 0.58 08/23/2023    CR 0.5 05/25/2023     (H) 08/24/2023     (H) 08/23/2023     COAGS:   Lab Results   Component Value Date    PTT 30 04/30/2008    INR 1.02 02/03/2023     POC:   Lab Results   Component Value Date     (H) 06/25/2021    HCG Negative 04/20/2011     HEPATIC:   Lab Results   Component Value Date    ALBUMIN 4.0 05/25/2023    PROTTOTAL 7.0 05/25/2023    ALT 9 (L) 05/25/2023    AST 14 05/25/2023    ALKPHOS 89 05/25/2023    BILITOTAL 0.3 05/25/2023    LACEY 15 02/07/2008     OTHER:   Lab Results   Component Value Date    PH 7.36 02/23/2023    LACT 0.9 02/20/2023    LACT 0.8 02/20/2023    A1C 7.5 (H) 08/23/2023    LUIGI 9.4 08/23/2023    PHOS 2.9 05/20/2022    MAG 2.1 05/22/2022    TSH 2.65 08/31/2022    CRP <2.9 05/22/2022       Anesthesia Plan    ASA Status:  3     NPO Status:  NPO Appropriate    Anesthesia Type: MAC.              Consents    Anesthesia Plan(s) and associated risks, benefits, and realistic alternatives discussed. Questions answered and patient/representative(s) expressed understanding.     - Discussed: Risks, Benefits and Alternatives for BOTH SEDATION and the PROCEDURE were discussed     - Discussed with:  Patient      - Extended Intubation/Ventilatory Support Discussed: No.      - Patient is DNR/DNI Status: No     Use of blood products discussed: No .     Postoperative Care            Comments:                Mesfin Roe MD

## 2023-09-05 NOTE — ANESTHESIA POSTPROCEDURE EVALUATION
Patient: Jenn Aparicio    Procedure: Procedure(s):  RIGHT EYE PHACOEMULSIFICATION, CATARACT, WITH INTRAOCULAR LENS IMPLANT       Anesthesia Type:  MAC    Note:  Disposition: Outpatient   Postop Pain Control: Uneventful            Sign Out: Well controlled pain   PONV: No   Neuro/Psych: Uneventful            Sign Out: Acceptable/Baseline neuro status   Airway/Respiratory: Uneventful            Sign Out: Acceptable/Baseline resp. status   CV/Hemodynamics: Uneventful            Sign Out: Acceptable CV status; No obvious hypovolemia; No obvious fluid overload   Other NRE: NONE   DID A NON-ROUTINE EVENT OCCUR? No           Last vitals:  Vitals Value Taken Time   /71 09/05/23 1430   Temp 36.1  C (96.9  F) 09/05/23 1430   Pulse 91 09/05/23 1430   Resp 14 09/05/23 1430   SpO2 97 % 09/05/23 1430       Electronically Signed By: Jackie Mobley MD  September 5, 2023  3:30 PM

## 2023-09-05 NOTE — OP NOTE
PREOPERATIVE DIAGNOSIS:   Combined form of age-related cataract, Right eye   POSTOPERATIVE DIAGNOSIS:   Combined form of age-related cataract, Right eye  PROCEDURES: Cataract extraction with intraocular lens implant Right eye.  SURGEON: Re Keita M.D.  ASSISTANT: none  CDE: 17.62  INDICATIONS: The patient Jenn Aparicio presented to the eye clinic with decreased vision secondary to cataract in the Right eye. The risks, benefits and alternatives to cataract extraction were discussed. The patient elected to proceed. All questions were answered to the patient's satisfaction.     DESCRIPTION OF PROCEDURE: Prior to the procedure, appropriate cardiac and respiratory monitors were applied to the patient.  In the pre-operative holding area, a drop of topical proparacaine 0.5% followed by three rounds of cyclopentolate 1%-tropicamide 1%-phenylephrine 2.5%,  by five minutes apart, were instilled into the procedure eye.  Patient was also given a dose of IV Diamox 500 mg preoperatively due to shallow anterior chamber in order to help deepen the eye.The patient was brought to the operating room where a surgical pause was carried out to identify with all members of the surgical team the correct surgical site.      With adequate anesthesia, the Right eye was prepped and draped in the usual sterile fashion for ophthalmic surgery. A lid speculum was placed, and the operating microscope was rotated into position. The surgeon was seated temporally. A paracentesis was created at the limbus with the surgeon's non dominant hand.  Through this limbal paracentesis, the anterior chamber was filled with preservative-free lidocaine, followed by preservative free epinephrine-BSS, followed by dispersive viscoelastic. Next, the main wound was created via a clear corneal incision with the surgeon's dominant hand using a 2.5 mm keratome blade. A capsulorrhexis was initiated using a 25-gauge bent needle, Cystotome, and a continuous  curvilinear capsulorhexis was completed in a circular fashion using the Utr[employer] forceps. Following the capsulorhexis, some of the OVD was egressed from the main wound, and then the lens nucleus was carefully hydrodissected using balanced salt solution on an AC cannula.  The lens nucleus was rotated and removed using phacoemulsification in a stop and chop technique.  Residual cortical material was removed using irrigation-aspiration.  The capsular bag was then filled with cohesive viscoelastic.  A +23.0 diopter [id number] lens was inserted into the capsular bag.  The lens power selected was reviewed using the intraocular lens power measurements that were obtained preoperatively to confirm that the correct lens was selected for the desired post-operative refractive state. After lens insertion, the residual viscoelastic was removed in its entirety with the I/A handpiece. The wounds were hydrated with balanced salt solution and found to be self-sealing. Preservative free intracameral moxifloxacin was administered. With a weck-angely spear the wounds were checked and no leaks were noted. A 10-0 nylon suture in a single interrupted fashion and directed radially was done at the [place]/main wound and the knot was subsequently buried.The intraocular pressure was noted to be within the normal range to digital palpation. Subconjunctival dexamethasone (2mg) was injected superiorly    The lid speculum was removed, one drop of timolol 0.5% and a small ribbon of maxitrol ophthalmic ointment was instilled in the operative eye. An eye shield was carefully placed over the operative eye. The patient tolerated the procedure well without any complications.    PLAN: The patient will be discharged to home and will follow up for post operative visit as scheduled. Counseled return precautions  EBL:  None  COMPLICATIONS:  None  Implant Name Type Inv. Item Serial No.  Lot No. LRB No. Used Action   LENS [id number] [employer]  BICONVEX IOL - [id number] Lens/Eye Implant LENS [id number] [facility] [id number] [facility]  Right 1 Implanted

## 2023-09-05 NOTE — ANESTHESIA CARE TRANSFER NOTE
Patient: Jenn Aparicio    Procedure: Procedure(s):  RIGHT EYE PHACOEMULSIFICATION, CATARACT, WITH INTRAOCULAR LENS IMPLANT       Diagnosis: Combined forms of age-related cataract of both eyes [H25.813]  Diagnosis Additional Information: No value filed.    Anesthesia Type:   MAC     Note:    Oropharynx: oropharynx clear of all foreign objects and spontaneously breathing  Level of Consciousness: awake  Oxygen Supplementation: nasal cannula  Level of Supplemental Oxygen (L/min / FiO2): 3 (wears 3L at home)  Independent Airway: airway patency satisfactory and stable  Dentition: dentition unchanged  Vital Signs Stable: post-procedure vital signs reviewed and stable  Report to RN Given: handoff report given  Patient transferred to: Phase II  Comments: Vital signs per nursing documentation.       Handoff Report: Identifed the Patient, Identified the Reponsible Provider, Reviewed the pertinent medical history, Discussed the surgical course, Reviewed Intra-OP anesthesia mangement and issues during anesthesia, Set expectations for post-procedure period and Allowed opportunity for questions and acknowledgement of understanding      Vitals:  Vitals Value Taken Time   BP     Temp     Pulse     Resp     SpO2         Electronically Signed By: BENOIT Cartwright CRNA  September 5, 2023  2:06 PM

## 2023-09-06 ENCOUNTER — OFFICE VISIT (OUTPATIENT)
Dept: OPHTHALMOLOGY | Facility: CLINIC | Age: 68
End: 2023-09-06
Attending: STUDENT IN AN ORGANIZED HEALTH CARE EDUCATION/TRAINING PROGRAM
Payer: COMMERCIAL

## 2023-09-06 DIAGNOSIS — Z98.890 POSTOPERATIVE EYE STATE: Primary | ICD-10-CM

## 2023-09-06 DIAGNOSIS — Z96.1 PSEUDOPHAKIA, BOTH EYES: ICD-10-CM

## 2023-09-06 PROCEDURE — 99207 OCT RETINA SPECTRALIS OS (LEFT EYE): CPT | Mod: 26 | Performed by: STUDENT IN AN ORGANIZED HEALTH CARE EDUCATION/TRAINING PROGRAM

## 2023-09-06 PROCEDURE — G0463 HOSPITAL OUTPT CLINIC VISIT: HCPCS | Performed by: STUDENT IN AN ORGANIZED HEALTH CARE EDUCATION/TRAINING PROGRAM

## 2023-09-06 PROCEDURE — 92134 CPTRZ OPH DX IMG PST SGM RTA: CPT | Performed by: STUDENT IN AN ORGANIZED HEALTH CARE EDUCATION/TRAINING PROGRAM

## 2023-09-06 PROCEDURE — 99024 POSTOP FOLLOW-UP VISIT: CPT | Performed by: STUDENT IN AN ORGANIZED HEALTH CARE EDUCATION/TRAINING PROGRAM

## 2023-09-06 RX ORDER — SILICONE ADHESIVE 1.5" X 3"
1-2 SHEET (EA) TOPICAL 4 TIMES DAILY
Qty: 15 ML | Refills: 0 | Status: SHIPPED | OUTPATIENT
Start: 2023-09-06 | End: 2023-10-12

## 2023-09-06 ASSESSMENT — EXTERNAL EXAM - LEFT EYE: OS_EXAM: NORMAL

## 2023-09-06 ASSESSMENT — SLIT LAMP EXAM - LIDS
COMMENTS: WNL
COMMENTS: WNL

## 2023-09-06 ASSESSMENT — VISUAL ACUITY
OS_SC: 20/50
METHOD: SNELLEN - LINEAR
OD_PH_SC: 20/25
OD_SC: 20/80
OD_PH_SC+: -3
OD_SC+: -1
OS_SC+: -2

## 2023-09-06 ASSESSMENT — TONOMETRY
OD_IOP_MMHG: 21
OS_IOP_MMHG: 24
IOP_METHOD: TONOPEN

## 2023-09-06 ASSESSMENT — EXTERNAL EXAM - RIGHT EYE: OD_EXAM: NORMAL

## 2023-09-06 NOTE — PATIENT INSTRUCTIONS
"Cataract Surgery Post-Operative Instructions        You have undergone cataract surgery today. Take it easy as the mild anesthesia wears off.      Your eye may feel a little scratchy and this should get better over the next few days      Use Tylenol (if there is no contraindication from a medical standpoint) if you experience any eye pain     Avoid sleeping on or putting pressure on the operated eye     Sleep with the eye shield at bedtime for the first week     You may resume light activity tomorrow, but no heavy lifting, no straining, no bending over especially the first week     Do not rub the eyes, no water in the eyes (no pools or hot tubs or tap water); when you do shower take care not to get water in the surgical eye     You can continue the following eye drops starting tonight in the RIGHT eye:     Vigamox (tan cap) 1 drop four times a day to right eye for 1 week, then stop  Ketorolac (grey top) 1 drop four times a day to right eye for 1 week, then 1 drop right times a day to surgical eye for 1 week, then 1 drop two times a day to right eye for 1 week , then 1 drop once a day to right eye for 1 week, then stop  Predforte (pink or white top) 1 drop four times a day to right eye for 1 week, then 1 drop right times a day to surgical eye for 1 week, then 1 drop two times a day to right eye for 1 week , then 1 drop once a day to right eye for 1 week, then stop  OPTIONAL: preservative free artificial tears (refresh, thera tears, systane, etc) 1 drop 4-6 times per day as needed (DO NOT use Visine or Clear Eyes or any eye drop product that states \"red out\")  If on glaucoma medications, then continue regular eye pressure drops  **wait 5-10 minutes between each drop**  **can refrigerate eye drops to reduce burning or stinging sensation**     You can continue the following eye drops starting tonight in the LEFT eye:     Ketorolac (grey top) 1 drop three times a day to left eye for 1 week, then 1 drop two times a day to " "left eye for 1 week , then 1 drop once a day to left eye for 1 week, then stop  Predforte (pink or white top) 1 drop three times a day to left eye for 1 week, then 1 drop two times a day to left eye for 1 week , then 1 drop once a day to left eye for 1 week, then stop  Azeem 128 5% drops, 1 drop four times a day to left eye  OPTIONAL: preservative free artificial tears (refresh, thera tears, systane, etc) 1 drop 4-6 times per day as needed (DO NOT use Visine or Clear Eyes or any eye drop product that states \"red out\")  If on glaucoma medications, then continue regular eye pressure drops  **wait 5-10 minutes between each drop**  **can refrigerate eye drops to reduce burning or stinging sensation**     Please contact Dr Keita at [phone] if any issues arise     Your follow up appointment is as scheduled on 9/6/2023 at  1:15 PM      [facility] - Department of Ophthalmology  [address] [place] [address], [phone]    "

## 2023-09-06 NOTE — PROGRESS NOTES
HPI       Post Op (Ophthalmology) Right Eye    Associated symptoms include dryness and itching.  Negative for eye pain, flashes and floaters. Additional comments: POD1 CE/IOL right eye 9/5/23 and POW2 left eye (8/24/23)             Comments    Pt felt a little nausea yesterday, today feels better.   Pain yesterday, today feels okay  Dryness and itching right eye today.    Pt states vision left eye is improving but right eye vision is blurry.  No new flashes, floaters, or curtain down visual field    Chris Ho 1:29 PM September 6, 2023            Last edited by Re Keita MD on 9/7/2023 12:44 PM.          Review of systems for the eyes was negative other than the pertinent positives/negatives listed in the HPI.    Ocular Meds: systane TID OU; olopatadine daily OU; postop drops as prescribed    Ocular Hx: refractive error OU; anatomically narrow angles OU; CE/IOL left eye 8/24/23; CE/IOL right eye 9/5/23    FOHx: no family history of glaucoma or blindness    PMHx:     Past Medical History:   Diagnosis Date    Adenocarcinoma, lung (H)     Asthma     Ectopic pregnancy     Esophageal reflux     Pulmonary emphysema (H)     Very severe FEV1<30% predicted    Type II diabetes mellitus (H)        Assessment & Plan     Jenn Aparicio is a 68 year old female with the following diagnoses:    Postoperative eye state  Pseudophakia OD  - Doing well, IOP okay, dedra negative, no signs of infection, edema as expected  - Keep eye shield in place at night for 7 days  - post-operative drops and taper according to instructions  -- vigamox QID x 1 week to surgical eye  -- predforte QID/TID/BID/daily/stop to procedure eye, taper weekly  -- ketorolac QID/TID/BID/daily/stop to procedure eye, taper weekly  -- can consider starting roger 128 5% QID to right eye if no significant improvement  - Post-operative do's and don'ts reviewed, questions answered  - patient has emergency contact information  - Counseled endophthalmitis/RD/return  precautions    Pseudophakia OS  - Doing well, IOP okay, dedra negative, no signs of infection  - there is some mild corneal edema, pachy 669 left eye today suspect contributing to slight decrease in vision  - OCT macula no fluid OS  - discontinue eye shield  - post-operative drops and taper according to instructions  -- discontinue vigamox  -- predforte TID/BID/daily/stop to procedure eye, taper weekly  -- ketorolac TID/BID/daily/stop to procedure eye, taper weekly  -- start roger 128 5% one drop four times a day to left eye (Rx provided)  - Post-operative do's and don'ts reviewed, questions answered  - patient has emergency contact information  - reviewed importance of medication compliance with patient  - Counseled endophthalmitis/RD/return precautions    Anatomically Narrow Angles OU  - IOP wnl, CVF full to CF OU  - no FHx of glaucoma  - previously on gonio undilated with 4-mirror has steep approach of iris with angles open to PTM/SS most areas, and opens on dynamic gonioscopy OU  - will gonio at post op month 1 visit after cataract surgery OU    T2DM without Retinopathy OU  - strict BP/BG control  - patient understands importance  of good blood glucose control in order to reduce the risk of developing diabetic retinopathy  - yearly DFE    Dry Eyes OU  - PFATs QID and prn OU    Refractive error  - hold on refraction until POM1 visit after cataract surgery    Counseled return/RD precautions    Patient disposition:   Return for Follow Up VT, pachy, for post op cataract surgery visit, or sooner changes.    Attending Physician Attestation:  Complete documentation of historical and exam elements from today's encounter can be found in the full encounter summary report (not reduplicated in this progress note).  I personally obtained the chief complaint(s) and history of present illness.  I confirmed and edited as necessary the review of systems, past medical/surgical history, family history, social history, and  examination findings as documented by others; and I examined the patient myself.  I personally reviewed the relevant tests, images, and reports as documented above.  I formulated and edited as necessary the assessment and plan and discussed the findings and management plan with the patient and family. . - Re Keita MD

## 2023-09-06 NOTE — NURSING NOTE
Chief Complaints and History of Present Illnesses   Patient presents with    Post Op (Ophthalmology) Right Eye     POD1 CE/IOL right eye 9/5/23 and POW2 left eye (8/24/23)     Chief Complaint(s) and History of Present Illness(es)       Post Op (Ophthalmology) Right Eye              Associated symptoms: dryness and itching.  Negative for eye pain, flashes and floaters    Comments: POD1 CE/IOL right eye 9/5/23 and POW2 left eye (8/24/23)              Comments    Pt feeling nausea since procedure yesterday.   Sharp stabbing pain right eye last night (10/10), no eye pain today.  Dryness and itching right eye today.    Pt states vision left eye is improving but right eye vision is still poor.     Chris Starks 1:29 PM September 6, 2023

## 2023-09-08 ENCOUNTER — OFFICE VISIT (OUTPATIENT)
Dept: URGENT CARE | Facility: URGENT CARE | Age: 68
End: 2023-09-08
Payer: COMMERCIAL

## 2023-09-08 VITALS
OXYGEN SATURATION: 91 % | DIASTOLIC BLOOD PRESSURE: 68 MMHG | SYSTOLIC BLOOD PRESSURE: 107 MMHG | WEIGHT: 207.6 LBS | TEMPERATURE: 98.7 F | BODY MASS INDEX: 34.55 KG/M2 | HEART RATE: 141 BPM

## 2023-09-08 DIAGNOSIS — A08.4 VIRAL GASTROENTERITIS: Primary | ICD-10-CM

## 2023-09-08 PROCEDURE — 99213 OFFICE O/P EST LOW 20 MIN: CPT | Performed by: PHYSICIAN ASSISTANT

## 2023-09-08 ASSESSMENT — ENCOUNTER SYMPTOMS
MYALGIAS: 0
SHORTNESS OF BREATH: 0
FEVER: 0
ARTHRALGIAS: 0
EYES NEGATIVE: 1
JOINT SWELLING: 0
MUSCULOSKELETAL NEGATIVE: 1
VOMITING: 0
ENDOCRINE NEGATIVE: 1
LIGHT-HEADEDNESS: 0
RHINORRHEA: 0
BACK PAIN: 0
RESPIRATORY NEGATIVE: 1
HEADACHES: 0
NECK PAIN: 0
WEAKNESS: 0
CHILLS: 0
NAUSEA: 1
DIARRHEA: 1
ALLERGIC/IMMUNOLOGIC NEGATIVE: 1
COUGH: 0
CARDIOVASCULAR NEGATIVE: 1
NECK STIFFNESS: 0
WOUND: 0
DIZZINESS: 0
PALPITATIONS: 0
SORE THROAT: 0

## 2023-09-08 NOTE — PROGRESS NOTES
Chief Complaint:    Chief Complaint   Patient presents with    Abdominal Pain     Pt c/o of nausea and stomach irritation, diarrhea. Pt has not taken anything for sx. Sx going on for 3 days not worsening but not getting any better. Pt daughter has the same sx, they do not live together but they are together everyday.        Medical Decision Making:    Vital signs reviewed by Albert Patel PA-C  /68   Pulse (!) 141   Temp 98.7  F (37.1  C) (Tympanic)   Wt 94.2 kg (207 lb 9.6 oz)   SpO2 91%   BMI 34.55 kg/m      Differential Diagnosis:  Abdominal Pain: Viral Gastroenteritis, food poisoning.      ASSESSMENT:     1. Viral gastroenteritis       PLAN:     Patient is in no acute distress.  She is afebrile with stable vital signs.  Abdominal exam was benign.    Push fluids.  BRAT diet discussed.    Patient instructed to follow up with PCP in 1 week if symptoms are not improving.  Sooner if symptoms worsen.  Worrisome symptoms discussed with instructions to go to the ED.  Patient verbalized understanding and agreed with this plan.    Labs:     No results found for any visits on 09/08/23.    Current Meds:    Current Outpatient Medications:     acetylcysteine (MUCOMYST) 10 % nebulizer solution, Inhale 4 mLs into the lungs 4 times daily (Patient taking differently: Inhale 4 mLs into the lungs as needed), Disp: 480 mL, Rfl: 0    albuterol (PROAIR HFA/PROVENTIL HFA/VENTOLIN HFA) 108 (90 Base) MCG/ACT inhaler, USE 1 OR 2 INHALATIONS EVERY 4 HOURS AS NEEDED (Patient taking differently: Inhale 1 puff into the lungs daily USE 1 OR 2 INHALATIONS EVERY 4 HOURS AS NEEDED), Disp: 17 g, Rfl: 3    albuterol (PROVENTIL) (2.5 MG/3ML) 0.083% neb solution, Take 1 vial (2.5 mg) by nebulization 4 times daily, Disp: 360 mL, Rfl: 0    Alpha Lipoic Acid 200 MG CAPS, Take 1 capsule by mouth daily (Patient taking differently: Take 1 capsule by mouth every morning), Disp: 90 capsule, Rfl: 3    amLODIPine (NORVASC) 10 MG tablet, Take 1  tablet (10 mg) by mouth daily, Disp: 90 tablet, Rfl: 3    ASPIRIN LOW DOSE 81 MG EC tablet, TAKE 1 TABLET DAILY (Patient taking differently: Take 81 mg by mouth every morning), Disp: 90 tablet, Rfl: 2    buPROPion (WELLBUTRIN XL) 300 MG 24 hr tablet, Take 1 tablet (300 mg) by mouth every morning, Disp: 90 tablet, Rfl: 3    cetirizine (ZYRTEC) 10 MG tablet, Take 1 tablet (10 mg) by mouth every morning, Disp: 30 tablet, Rfl: 11    Continuous Blood Gluc Sensor (FREESTYLE GIOVANNI 2 SENSOR) MISC, 1 each every 14 days For use with Freestyle Giovanni 2  for continuous monitioring of blood glucose levels. Replace sensor every 14 days., Disp: 2 each, Rfl: 11    cyanocobalamin (VITAMIN B-12) 500 MCG tablet, Take 1 tablet (500 mcg) by mouth daily (Patient taking differently: Take 500 mcg by mouth every morning), Disp: 90 tablet, Rfl: 1    doxycycline hyclate (VIBRA-TABS) 100 MG tablet, Take 1 tablet (100 mg) by mouth 2 times daily (Patient taking differently: Take 100 mg by mouth every morning), Disp: 10 tablet, Rfl: 1    empagliflozin (JARDIANCE) 25 MG TABS tablet, Take 1 tablet (25 mg) by mouth daily, Disp: 90 tablet, Rfl: 0    EPINEPHrine (ANY BX GENERIC EQUIV) 0.3 MG/0.3ML injection 2-pack, Inject 0.3 mLs (0.3 mg) into the muscle as needed for anaphylaxis (related to bee stings) May repeat one time in 5-15 minutes if response to initial dose is inadequate., Disp: 2 each, Rfl: 1    fluticasone (FLONASE) 50 MCG/ACT nasal spray, Spray 1 spray into both nostrils 2 times daily Use at night before bed, Disp: 15.8 mL, Rfl: 3    GLUCOSAMINE-CHONDROITIN -400 MG tablet, TAKE 1 TABLET DAILY (Patient taking differently: Take 1 tablet by mouth every evening), Disp: 90 tablet, Rfl: 3    insulin pen needle (BD MARCIO U/F) 32G X 4 MM miscellaneous, Use 1 pen needle daily or as directed., Disp: 100 each, Rfl: 1    ketorolac (ACULAR) 0.5 % ophthalmic solution, Place 1 drop into the right eye 4 times daily, Disp: 5 mL, Rfl: 0     ketotifen (ZADITOR) 0.025 % ophthalmic solution, Place 1 drop into both eyes 2 times daily, Disp: , Rfl:     moxifloxacin (VIGAMOX) 0.5 % ophthalmic solution, Place 1 drop into the right eye 4 times daily, Disp: 3 mL, Rfl: 0    multivitamin w/minerals (THERA-VIT-M) tablet, Take 1 tablet by mouth daily (Patient taking differently: Take 1 tablet by mouth every morning), Disp: 30 tablet, Rfl: 11    nicotine (NICODERM CQ) 14 MG/24HR 24 hr patch, Place 1 patch onto the skin every 24 hours, Disp: , Rfl:     nicotine (NICORETTE) 4 MG lozenge, Place 1 lozenge (4 mg) inside cheek every hour as needed for smoking cessation, Disp: 100 lozenge, Rfl: 0    nicotine (NICOTROL) 10 MG inhaler, Use 1 cartridge as needed for urge to smoke by puffing over course of 20min.  Use 6-16 cart/day; reduce number of cart/day over 6-12 weeks., Disp: 160 each, Rfl: 0    olopatadine (PATADAY) 0.2 % ophthalmic solution, Place 0.05 mLs (1 drop) into both eyes daily, Disp: 2.5 mL, Rfl: 11    omega-3 acid ethyl esters (LOVAZA) 1 g capsule, Take 2 capsules (2 g) by mouth 2 times daily (Patient taking differently: Take 1 g by mouth 2 times daily Take 2 capsules (2 g) by mouth 2 times daily), Disp: 360 capsule, Rfl: 3    omeprazole (PRILOSEC) 20 MG DR capsule, TAKE 1 CAPSULE TWICE A DAY, Disp: 180 capsule, Rfl: 3    polyethylene glycol-propylene glycol (SYSTANE) 0.4-0.3 % SOLN ophthalmic solution, Place 1 drop into both eyes 4 times daily, Disp: 5 mL, Rfl: 11    polyethylene glycol-propylene glycol (SYSTANE) 0.4-0.3 % SOLN ophthalmic solution, Place 1 drop into both eyes 4 times daily, Disp: 5 mL, Rfl: 11    prednisoLONE acetate (PRED FORTE) 1 % ophthalmic suspension, Place 1 drop into the right eye 4 times daily, Disp: 5 mL, Rfl: 0    predniSONE (DELTASONE) 20 MG tablet, Take 2 tablets (40 mg) by mouth daily, Disp: 10 tablet, Rfl: 3    pregabalin (LYRICA) 150 MG capsule, TAKE 1 CAPSULE(150 MG) BY MOUTH TWICE DAILY, Disp: 90 capsule, Rfl: 1     roflumilast (DALIRESP) 250 MCG TABS tablet, Take 1 tablet (250 mcg) by mouth daily for 28 days, THEN 2 tablets (500 mcg) daily for 62 days., Disp: 152 tablet, Rfl: 0    semaglutide (OZEMPIC, 0.25 OR 0.5 MG/DOSE,) 2 MG/3ML pen, Inject 0.5 mg Subcutaneous once a week, Disp: 3 mL, Rfl: 11    sodium chloride (AMBROSE 128) 5 % ophthalmic solution, Place 1-2 drops Into the left eye 4 times daily, Disp: 15 mL, Rfl: 0    STATIN NOT PRESCRIBED (INTENTIONAL), Please choose reason not prescribed from choices below., Disp: , Rfl:     TRELEGY ELLIPTA 200-62.5-25 MCG/ACT oral inhaler, USE 1 INHALATION DAILY, Disp: 28 each, Rfl: 5    Allergies:  Allergies   Allergen Reactions    Bee Venom Anaphylaxis    Interferons Dermatitis    Penicillins Hives    Aspirin      325mg     Colon Care        SUBJECTIVE    HPI: Jenn Aparicio is an 68 year old female who presents for evaluation and treatment of nausea, diarrhea, and stomach cramps.  Symptoms started 3 days ago and have not changed.  Her daughter is here with similar symptoms.  She has not tried anything for the symptoms.  No fever, chills, or vomiting.  No black tarry stools.  No foreign travel.      ROS:      Review of Systems   Constitutional:  Negative for chills and fever.   HENT:  Negative for congestion, ear pain, rhinorrhea and sore throat.    Eyes: Negative.    Respiratory: Negative.  Negative for cough and shortness of breath.    Cardiovascular: Negative.  Negative for chest pain and palpitations.   Gastrointestinal:  Positive for diarrhea and nausea. Negative for vomiting.   Endocrine: Negative.    Genitourinary: Negative.    Musculoskeletal: Negative.  Negative for arthralgias, back pain, joint swelling, myalgias, neck pain and neck stiffness.   Skin: Negative.  Negative for rash and wound.   Allergic/Immunologic: Negative.  Negative for immunocompromised state.   Neurological:  Negative for dizziness, weakness, light-headedness and headaches.        Family History   Family  History   Problem Relation Age of Onset    Thyroid Disease Mother     Cerebrovascular Disease Mother     Hypertension Mother     Hypertension Father     Glaucoma Father     Cancer Sister     Lung Cancer Sister     Diabetes Brother     Cancer Brother     Diabetes Brother     Cancer Brother     Diabetes Brother     Deep Vein Thrombosis (DVT) Daughter     Depression Daughter     Alcohol/Drug Other         self    Diabetes Other         self    Thyroid Disease Other         self    Asthma Other         self    Macular Degeneration No family hx of     Anesthesia Reaction No family hx of        Social History  Social History     Socioeconomic History    Marital status: Single     Spouse name: Not on file    Number of children: Not on file    Years of education: Not on file    Highest education level: Not on file   Occupational History    Not on file   Tobacco Use    Smoking status: Former     Packs/day: 0.25     Types: Cigarettes    Smokeless tobacco: Never    Tobacco comments:     01/02/2023 Patient using 14 mg patch, wants to have prescription for Nicotrol inhaler, took workbook   Substance and Sexual Activity    Alcohol use: Not Currently    Drug use: No    Sexual activity: Not Currently     Partners: Male   Other Topics Concern    Not on file   Social History Narrative    Ms. Aparicio lives in an apartment complex by herself as of May 2022. She has frequent smoke exposure from neighbors even when she is not smoking herself.      Social Determinants of Health     Financial Resource Strain: Not on file   Food Insecurity: Not on file   Transportation Needs: Not on file   Physical Activity: Not on file   Stress: Not on file   Social Connections: Not on file   Intimate Partner Violence: Not At Risk (3/1/2023)    Humiliation, Afraid, Rape, and Kick questionnaire     Fear of Current or Ex-Partner: No     Emotionally Abused: No     Physically Abused: No     Sexually Abused: No   Housing Stability: Not on file        Surgical  History:  Past Surgical History:   Procedure Laterality Date    2/8/16                R thoracotomy, RLL lobectomy (Dr. Cunha). Adenocarcinoma, 1.1 cm, assoicated with atypical adenomatous hyperplasia Right 02/08/2016    R thoracotomy, RLL lobectomy (Dr. Cunha). Adenocarcinoma, 1.1 cm, assoicated with atypical adenomatous hyperplasia    BRONCHOSCOPY, WITH BIOPSY, ROBOT ASSISTED N/A 7/19/2023    Procedure: robot assisted Ion BRONCHOSCOPY, WITH BIOPSY;  Surgeon: Patria Garcia MD;  Location:  OR    ENDOBRONCHIAL ULTRASOUND FLEXIBLE N/A 7/19/2023    Procedure: Endobronchial ultrasound flexible;  Surgeon: Patria Garcia MD;  Location:  OR    ESOPHAGOSCOPY, GASTROSCOPY, DUODENOSCOPY (EGD), COMBINED N/A 8/16/2022    Procedure: ESOPHAGOGASTRODUODENOSCOPY (EGD);  Surgeon: Travis Briseno MD;  Location: [facility]    ESOPHAGOSCOPY, GASTROSCOPY, DUODENOSCOPY (EGD), COMBINED N/A 8/8/2023    Procedure: ESOPHAGOGASTRODUODENOSCOPY, WITH BIOPSY;  Surgeon: Chao Rodrigues DO;  Location:  GI    ORTHOPEDIC SURGERY      PHACOEMULSIFICATION CLEAR CORNEA WITH STANDARD INTRAOCULAR LENS IMPLANT Left 8/24/2023    Procedure: LEFT EYE PHACOEMULSIFICATION, CATARACT, WITH INTRAOCULAR LENS IMPLANT;  Surgeon: Re Keita MD;  [place] [facility] [place]    PHACOEMULSIFICATION CLEAR CORNEA WITH STANDARD INTRAOCULAR LENS IMPLANT Right 9/5/2023    Procedure: RIGHT EYE PHACOEMULSIFICATION, CATARACT, WITH INTRAOCULAR LENS IMPLANT;  Surgeon: Re Keita MD;  [place] [facility] [place]        Problem List:  Patient Active Problem List   Diagnosis    Vaginitis    Postmenopausal bleeding    Cervical stenosis (uterine cervix)    Pulmonary emphysema (H)    Type 2 diabetes mellitus without complication, without long-term current use of insulin (H)    Primary lung adenocarcinoma (H) STAGE: IA2 [id number] poorly diff adeno RLL, then [id number] IA2 suspected NSCLC RUL, medically inoperable     Chronic obstructive pulmonary disease, unspecified COPD type (H)     Malnutrition (H)    Diabetic neuropathy (H)    Hypoxia    Shortness of breath    Chest pain, unspecified type    Hypokalemia    Gastroesophageal reflux disease without esophagitis    Esophageal dysphagia    Acute and chronic respiratory failure with hypoxia (H)    Combined forms of age-related cataract of both eyes           OBJECTIVE:     Vital signs noted and reviewed by Albert Patel PA-C  /68   Pulse (!) 141   Temp 98.7  F (37.1  C) (Tympanic)   Wt 94.2 kg (207 lb 9.6 oz)   SpO2 91%   BMI 34.55 kg/m       PEFR:    Physical Exam  Vitals and nursing note reviewed.   Constitutional:       General: She is not in acute distress.     Appearance: She is well-developed. She is not ill-appearing, toxic-appearing or diaphoretic.   HENT:      Head: Normocephalic and atraumatic.      Right Ear: Tympanic membrane and external ear normal. No drainage, swelling or tenderness. Tympanic membrane is not perforated, erythematous, retracted or bulging.      Left Ear: Tympanic membrane and external ear normal. No drainage, swelling or tenderness. Tympanic membrane is not perforated, erythematous, retracted or bulging.      Nose: No mucosal edema, congestion or rhinorrhea.      Right Sinus: No maxillary sinus tenderness or frontal sinus tenderness.      Left Sinus: No maxillary sinus tenderness or frontal sinus tenderness.      Mouth/Throat:      Pharynx: No pharyngeal swelling, oropharyngeal exudate, posterior oropharyngeal erythema or uvula swelling.      Tonsils: No tonsillar abscesses.   Eyes:      Pupils: Pupils are equal, round, and reactive to light.   Neck:      Trachea: Trachea normal.   Cardiovascular:      Rate and Rhythm: Normal rate and regular rhythm.      Heart sounds: Normal heart sounds, S1 normal and S2 normal. No murmur heard.     No friction rub. No gallop.   Pulmonary:      Effort: Pulmonary effort is normal. No respiratory distress.      Breath sounds: Normal breath sounds. No decreased breath  sounds, wheezing, rhonchi or rales.   Abdominal:      General: Bowel sounds are normal. There is no distension.      Palpations: Abdomen is soft. Abdomen is not rigid. There is no mass.      Tenderness: There is no abdominal tenderness. There is no guarding or rebound.   Musculoskeletal:      Cervical back: Full passive range of motion without pain, normal range of motion and neck supple.   Lymphadenopathy:      Cervical: No cervical adenopathy.   Skin:     General: Skin is warm and dry.   Neurological:      Mental Status: She is alert and oriented to person, place, and time.      Cranial Nerves: No cranial nerve deficit.      Deep Tendon Reflexes: Reflexes are normal and symmetric.   Psychiatric:         Behavior: Behavior normal. Behavior is cooperative.         Thought Content: Thought content normal.         Judgment: Judgment normal.             Albert Patel PA-C  9/8/2023, 6:00 PM

## 2023-09-13 LAB
ACID FAST STAIN (ARUP): NORMAL

## 2023-09-14 ENCOUNTER — DOCUMENTATION ONLY (OUTPATIENT)
Dept: INTERNAL MEDICINE | Facility: CLINIC | Age: 68
End: 2023-09-14
Payer: COMMERCIAL

## 2023-09-14 ENCOUNTER — TELEPHONE (OUTPATIENT)
Dept: GASTROENTEROLOGY | Facility: CLINIC | Age: 68
End: 2023-09-14
Payer: COMMERCIAL

## 2023-09-14 NOTE — TELEPHONE ENCOUNTER
Called to remind patient of their upcoming appointment with our GI clinic, on Monday, September 18th at 2:00pm with Gabriela Damon . This appointment is scheduled as an in-person appt. Please arrive 15 minutes early to check in for your appointment. , if your appointment is virtual (video or telephone) you need to be in [place] for the visit. To reschedule or cancel patient to call [phone].    Tracee Ryan

## 2023-09-14 NOTE — PROGRESS NOTES
Type of Form Received:     Form Received (Date) 9/14/23   Form Filled out Yes, 9/25/23   Placed in provider folder Yes

## 2023-09-15 NOTE — TELEPHONE ENCOUNTER
Called to remind patient of their upcoming appointment with our GI clinic, on Monday, September 18th at 2:00pm with Gabriela Damon . This appointment is scheduled as an in-person appt. Please arrive 15 minutes early to check in for your appointment. , if your appointment is virtual (video or telephone) you need to be in [place] for the visit. To reschedule or cancel patient to call [phone].     Tracee Ryan

## 2023-09-18 ENCOUNTER — OFFICE VISIT (OUTPATIENT)
Dept: GASTROENTEROLOGY | Facility: CLINIC | Age: 68
End: 2023-09-18
Payer: COMMERCIAL

## 2023-09-18 VITALS
HEIGHT: 66 IN | DIASTOLIC BLOOD PRESSURE: 63 MMHG | BODY MASS INDEX: 33.64 KG/M2 | WEIGHT: 209.3 LBS | HEART RATE: 108 BPM | SYSTOLIC BLOOD PRESSURE: 113 MMHG | OXYGEN SATURATION: 91 %

## 2023-09-18 DIAGNOSIS — D12.6 ADENOMATOUS POLYP OF COLON, UNSPECIFIED PART OF COLON: ICD-10-CM

## 2023-09-18 DIAGNOSIS — K44.9 HIATAL HERNIA: ICD-10-CM

## 2023-09-18 DIAGNOSIS — Z12.11 COLON CANCER SCREENING: ICD-10-CM

## 2023-09-18 DIAGNOSIS — K21.9 GASTROESOPHAGEAL REFLUX DISEASE, UNSPECIFIED WHETHER ESOPHAGITIS PRESENT: Primary | ICD-10-CM

## 2023-09-18 PROCEDURE — 99215 OFFICE O/P EST HI 40 MIN: CPT | Performed by: PHYSICIAN ASSISTANT

## 2023-09-18 RX ORDER — OMEPRAZOLE 40 MG/1
40 CAPSULE, DELAYED RELEASE ORAL 2 TIMES DAILY
Qty: 180 CAPSULE | Refills: 1 | Status: SHIPPED | OUTPATIENT
Start: 2023-09-18 | End: 2024-03-16

## 2023-09-18 ASSESSMENT — PAIN SCALES - GENERAL: PAINLEVEL: EXTREME PAIN (8)

## 2023-09-18 NOTE — LETTER
9/18/2023         RE: Jenn Aparicio  [address]  [place] [address]        Dear Colleague,    Thank you for referring your patient, Jenn Aparicio, to the [facility] [place]. Please see a copy of my visit note below.    Gastroenterology Visit for: Jenn Aparicio 1955   MRN: [id number]     Reason for Visit:  chief complaint    Referred by: Self  / No address on file  Patient Care Team:  Mitzi Nettles APRN CNP as PCP - General (Nurse Practitioner)  Zarina Leung MD as MD (Internal Medicine-Hematology & Oncology)  Latesha Christianson, JOHANN as Clinic Care Coordinator  Zarina Leung MD as Assigned Cancer Care Provider  Mitzi Nettles APRN CNP as Assigned PCP  Lynda Spann MD as Fellow (Pulmonary Disease)  Earl Mcgrath RPH as Pharmacist (Pharmacist Clinician- Clinical Pharmacy Specialist)  Travis Briseno MD as Assigned Gastroenterology Provider  La Nena Shabazz MD as MD (Endocrinology, Diabetes, and Metabolism)  Nithya Narvaez, RN as Diabetes Educator (Diabetes Education)  Nithay Narvaez RN as Diabetes Educator (Diabetes Education)  La Nena Shabazz MD as Assigned Endocrinology Provider  Kush Santamaria MD as MD (Internal Medicine)  Kailee Moralez MD as Resident (Student in organized health care education/training program)  Gab Walden MD as Hospitalist (Internal Medicine)  Re Keita MD as MD (Ophthalmology)  Samy Prasad MARC as Assigned MTM Pharmacist  Earl Ureña MD as Assigned Pulmonology Provider  Re Keita MD as Assigned Surgical Provider  Ana Davey, RN as Specialty Care Coordinator (Hematology & Oncology)    History of Present Illness:   Jenn Aparicio is a 68 year old female with significant past medical history pertinent for COPD, adenocarcinoma of the lung, acute on chronic hypoxic respiratory failure, DM type II complicated by diabetic neuropathy, who is presenting as a follow up patient  however as a new patient to myself with a chief complaint of GERD.    Interval History September 18, 2023:    CBC CMP 5/2023 WNL.     Since the endoscopy 8/8/2023 she has had no problems with dysphagia.     Currently she is experiencing heartburn/regurgitation daily after each meal. Currently she is taking Omeprazole 20mg twice daily 30 - 60 minutes prior to meals.  Noting lemon, chocolate are a dietary trigger for reflux.     Symptoms of reflux have worsened since initiation of Ozempic.     Denies weight loss, nausea, emesis, dysphagia, odynophagia, abdominal pain, diarrhea, constipation (< 3 stools per week), nocturnal stooling, incontinence of feces, melena, hematochezia and BRBR.     Brother possibly had pancreatic cancer? Patient is not certain to the details    No additional family history or GI related malignancy (esophageal, gastric, pancreatic, liver or colon).     ---------------------------------------------------------------------------------------------------------------------------------------------------------------    7/2023 Dr. BRITTANY Briseno [facility]    History of Present Illness:   This is a patient evidence following for the last few years with severe acid reflux associate with emphysema also dysphagia which has been difficult to pin down she had initially an esophagram which are demonstrated some tertiary contractions and EGD follow-up of this which did not demonstrate any abnormalities with exception of a hiatal hernia.  The patient went on to have an esophageal manometry recently which is read as normal.  The patient's continue to have difficulty swallowing sometimes regurgitating food back up is not sure from where when I talk about her chest or areas that she feels sensations it is difficult for her to answer that.  She sometimes feels like she has a sore throat and cannot swallow.    Esophageal Questionnaire(s)    BEDQ Questionnaire      6/28/2023     2:12 PM   BEDQ Questionnaire: How Often Have You Had  the Following?   Trouble eating solid food (meat, bread, vegetables) 4   Trouble eating soft foods (yogurt, jello, pudding) 0   Trouble swallowing liquids 4   Pain while swallowing 0   Coughing or choking while swallowing foods or liquids 2   Total Score: 10         6/28/2023     2:12 PM   BEDQ Questionnaire: Discomfort/Pain Ratings   Eating solid food (meat, bread, vegetables) 0   Eating soft foods (yogurt, jello, pudding) 0   Drinking liquid 0   Total Score: 0       Eckardt Questionnaire      6/28/2023     2:12 PM   Eckardt Questionnaire   Dysphagia 2   Regurgitation 3   Retrosternal Pain 0   Weight Loss (kg) 0   Total Score:  5       Promis 10 Questionnaire       No data to display                STUDIES & PROCEDURES:    EGD:     8/8/2023 Dr. Rodrigues  Findings:       There was pooling of thin secretions in the posterior        oropharynx/hypopharynx and in the vallecula. This was suctioned by the        anesthesia team repeatedly throughout the procedure        The examined esophagus was normal.        No endoscopic abnormality was evident in the esophagus to explain the        patient's complaint of dysphagia. It was decided, however, to proceed        with dilation at the cricopharyngeus. A TTS dilator was passed through        the scope. Dilation with 11-12-13.5-15-16mm dilator was performed. The        dilation site was examined and showed no change. Estimated blood loss:        none.        The Z-line was regular and was found 38 cm from the incisors.        A 2 cm hiatal hernia was found. The proximal extent of the gastric folds        (end of tubular esophagus) was 38 cm from the incisors. The hiatal        narrowing was 40 cm from the incisors.        The gastroesophageal flap valve was visualized endoscopically and        classified as Hill Grade III (minimal fold, loose to endoscope, hiatal        hernia likely).        Patchy moderately erythematous mucosa without bleeding was found in the        gastric  body and in the gastric antrum. Biopsies were taken with a cold        forceps for Helicobacter pylori testing. Verification of patient        identification for the specimen was done. Estimated blood loss: none.        The duodenal bulb, first portion of the duodenum, second portion of the        duodenum and examined duodenum were normal.                                                                                    Impression:            - Normal esophagus.                          - No endoscopic esophageal abnormality to explain                          patient's dysphagia. Esophagus dilated. Dilated.                          - Z-line regular, 38 cm from the incisors.                          - 2 cm hiatal hernia.                          - Gastroesophageal flap valve classified as Hill Grade                          III (minimal fold, loose to endoscope, hiatal hernia                          likely).                          - Erythematous mucosa in the gastric body and antrum.                          Biopsied.                          - Normal duodenal bulb, first portion of the duodenum,                          second portion of the duodenum and examined duodenum.     Final Diagnosis   A. STOMACH, BIOPSY:  - Gastric mucosa with mild chronic inactive gastritis  - Negative for H. pylori-like organisms on routine staining  - Negative for intestinal metaplasia or dysplasia        8/16/2022   Findings:       The examined esophagus was normal. Normal secondary peristalsis        observered, without any tertiary contractions.        Esophagogastric landmarks were identified: the Z-line was found at 37        cm, the gastroesophageal junction was found at 37 cm and the site of        hiatal narrowing was found at 37 cm from the incisors.        A 3 cm hiatal hernia was present.        No other significant abnormalities were identified in a careful        examination of the stomach.        The cardia and  gastric fundus were normal on retroflexion.        The in the duodenum was normal.                                                                                    Impression:               - Normal esophagus.                             - Esophagogastric landmarks identified.                             - 3 cm hiatal hernia. Otherwise normal stomach.                             - Normal duodenum.                             - No specimens collected.     Colonoscopy:    [facility]    Findings:   Three sessile polyps were found in the sigmoid colon, descending colon   and appendiceal orifice. The polyps were 3 to 4 mm in size. These polyps   were removed with a cold snare. Resection and retrieval were complete.   Four sessile polyps were found in the transverse colon and cecum. The   polyps were 1 to 2 mm in size. These polyps were removed with a cold   biopsy forceps. Resection and retrieval were complete.   Multiple small and large-mouthed diverticula were found in the sigmoid   colon, descending colon, splenic flexure, hepatic flexure, ascending   colon and cecum.     Impression: - Three 3 to 4 mm polyps in the sigmoid colon, in the   descending colon and at the appendiceal orifice, removed   with a cold snare. Resected and retrieved.   - Four 1 to 2 mm polyps in the transverse colon and in the   cecum, removed with a cold biopsy forceps. Resected and   retrieved.   - Diverticulosis in the sigmoid colon, in the descending   colon, at the splenic flexure, at the hepatic flexure, in   the ascending colon and in the cecum.   Recommendation:    - Discharge patient to home.   - Return to referring physician.   - Repeat colonoscopy in 3 years for surveillance.      Final Diagnosis:   A.  Colon, cecum and appendiceal, polyp, biopsy -   Tubular adenoma, multiple fragments.   B.  Colon, transverse, polyp, biopsy -  Tubular adenoma.   C.  Colon, descending and sigmoid, polyp, biopsy -    Tubular adenoma, multiple  "fragments.  Sessile serrated adenoma.     EndoFLIP directed at the UES or LES (8cm (EF-325) balloon length or 16cm (EF-322) balloon length):   Date:  8cm balloon  Balloon inflation Balloon pressure CSA (mm^2) DI (mm^2/mmHg) Dmin (mm) Compliance   20 (ladmark ID)        30        40        50           16cm balloon  Balloon inflation Balloon pressure CSA (mm^2) DI (mm^2/mmHg) Dmin (mm) Compliance   30 (ladmark ID)        40        50        60        70           High Resolution Manometry:    6/28/2023   Impression    Interpretation / Findings  \"The baseline tone of the lower esophageal sphincter was normotensive. The lower esophageal sphincter was able to relax appropriately as measured by the IRP: supine median 12.3 mm Hg, upright median 10.4 mm Hg. The EGJ morphology was type II. There was peristalsis visible. The DCI, DL, and contractile pattern were/were not within normal limits. There were 10/10 swallows with elevated intrabolus pressure and compartmentalized pressurization. Rapid water swallows were performed with normal augmentation. Rapid Drink Challenge does not indicate an elevated IRP. Supine liquid swallows were performed. Upright liquid swallows were performed. Bolus transit as measured by impedance was complete in 10/10 swallows. This is most consistent with absence of major disorder of esophageal peristalsis. Upper esophageal sphincter pressure somewhat elevated but clinical significant not clear.      Wording of procedure description and interpretation was adapted from [employer] Weekly Motility Conference (2018).\"       Impressions  There was no evidence of major disorder of esophageal peristalsis per [place] 4.0 classification. Consider work up for alternate causes of dysphagia including speech path study with modified barium swallow if not done.       PH/Impedance:     Echeverria:    CT:    5/25/2023 CT AP W Contrast   IMPRESSION:  1.  Unchanged right upper lobe nodular " consolidation. However, there is new nodular/masslike area of consolidation anteriorly and laterally. While this may reflect focal pneumonitis, neoplastic progression would be difficult to exclude. Consider further   evaluation with PET/CT or close imaging surveillance with repeat CT in 3 months.  2.  Unchanged scarlike right apical nodule, which is of doubtful clinical significance.  3.  Moderate emphysema.  4.  No new lymphadenopathy.    Esophagram:    5/20/2022 Esophagram   FINDINGS:   The esophagus demonstrates tertiary contractions suggestive of  dysmotility. There is no evidence of intrinsic or extrinsic mass or  polyp. No constricting or obstructing lesion is identified. There is  no mucosal ulceration. Mucosal folds are normal in appearance. Small  hiatal hernia. Spontaneous gastroesophageal reflux is visualized at  the level of the clavicle in the RPO position with head elevated  approximately 30 degrees.                                                                      IMPRESSION: Small hiatal hernia with spontaneous gastroesophageal  reflux up to the level of the clavicle is visualized. Tertiary  contractions suggestive of esophageal dysmotility.      FL VSS:     GES:    U/S:     XRAY:    Other:       Prior medical records were reviewed including, but not limited to, notes from referring providers, lab work, radiographic tests, and other diagnostic tests. Pertinent results were summarized above.     History     Past Medical History:   Diagnosis Date    Adenocarcinoma, lung (H)     Asthma     Ectopic pregnancy     Esophageal reflux     Pulmonary emphysema (H)     Very severe FEV1<30% predicted    Type II diabetes mellitus (H)        Past Surgical History:   Procedure Laterality Date    2/8/16                R thoracotomy, RLL lobectomy (Dr. Cunha). Adenocarcinoma, 1.1 cm, assoicated with atypical adenomatous hyperplasia Right 02/08/2016    R thoracotomy, RLL lobectomy (Dr. Cunha). Adenocarcinoma,  1.1 cm, assoicated with atypical adenomatous hyperplasia    BRONCHOSCOPY, WITH BIOPSY, ROBOT ASSISTED N/A 7/19/2023    Procedure: robot assisted Ion BRONCHOSCOPY, WITH BIOPSY;  Surgeon: Patria Garcia MD;  Location:  OR    ENDOBRONCHIAL ULTRASOUND FLEXIBLE N/A 7/19/2023    Procedure: Endobronchial ultrasound flexible;  Surgeon: Patria Garcia MD;  Location:  OR    ESOPHAGOSCOPY, GASTROSCOPY, DUODENOSCOPY (EGD), COMBINED N/A 8/16/2022    Procedure: ESOPHAGOGASTRODUODENOSCOPY (EGD);  Surgeon: Travis Briseno MD;  [facility]    ESOPHAGOSCOPY, GASTROSCOPY, DUODENOSCOPY (EGD), COMBINED N/A 8/8/2023    Procedure: ESOPHAGOGASTRODUODENOSCOPY, WITH BIOPSY;  Surgeon: Chao Rodrigues DO;  Location:  GI    ORTHOPEDIC SURGERY      PHACOEMULSIFICATION CLEAR CORNEA WITH STANDARD INTRAOCULAR LENS IMPLANT Left 8/24/2023    Procedure: LEFT EYE PHACOEMULSIFICATION, CATARACT, WITH INTRAOCULAR LENS IMPLANT;  Surgeon: Re Keita MD;  Location: [facility] [place]    PHACOEMULSIFICATION CLEAR CORNEA WITH STANDARD INTRAOCULAR LENS IMPLANT Right 9/5/2023    Procedure: RIGHT EYE PHACOEMULSIFICATION, CATARACT, WITH INTRAOCULAR LENS IMPLANT;  Surgeon: Re Keita MD;  Location: [facility] [place]       Social History     Socioeconomic History    Marital status: Single     Spouse name: Not on file    Number of children: Not on file    Years of education: Not on file    Highest education level: Not on file   Occupational History    Not on file   Tobacco Use    Smoking status: Former     Packs/day: 0.25     Types: Cigarettes    Smokeless tobacco: Never    Tobacco comments:     01/02/2023 Patient using 14 mg patch, wants to have prescription for Nicotrol inhaler, took workbook   Substance and Sexual Activity    Alcohol use: Not Currently    Drug use: No    Sexual activity: Not Currently     Partners: Male   Other Topics Concern    Not on file   Social History Narrative    Ms. Aparicio lives in an apartment complex by herself as of May 2022. She has  frequent smoke exposure from neighbors even when she is not smoking herself.      Social Determinants of Health     Financial Resource Strain: Not on file   Food Insecurity: Not on file   Transportation Needs: Not on file   Physical Activity: Not on file   Stress: Not on file   Social Connections: Not on file   Intimate Partner Violence: Not At Risk (3/1/2023)    Humiliation, Afraid, Rape, and Kick questionnaire     Fear of Current or Ex-Partner: No     Emotionally Abused: No     Physically Abused: No     Sexually Abused: No   Housing Stability: Not on file       Family History   Problem Relation Age of Onset    Thyroid Disease Mother     Cerebrovascular Disease Mother     Hypertension Mother     Hypertension Father     Glaucoma Father     Cancer Sister     Lung Cancer Sister     Diabetes Brother     Cancer Brother     Diabetes Brother     Cancer Brother     Diabetes Brother     Deep Vein Thrombosis (DVT) Daughter     Depression Daughter     Alcohol/Drug Other         self    Diabetes Other         self    Thyroid Disease Other         self    Asthma Other         self    Macular Degeneration No family hx of     Anesthesia Reaction No family hx of      Family history reviewed and edited as appropriate    Medications and Allergies:     Outpatient Encounter Medications as of 9/18/2023   Medication Sig Dispense Refill    albuterol (PROAIR HFA/PROVENTIL HFA/VENTOLIN HFA) 108 (90 Base) MCG/ACT inhaler USE 1 OR 2 INHALATIONS EVERY 4 HOURS AS NEEDED (Patient taking differently: Inhale 1 puff into the lungs daily USE 1 OR 2 INHALATIONS EVERY 4 HOURS AS NEEDED) 17 g 3    albuterol (PROVENTIL) (2.5 MG/3ML) 0.083% neb solution Take 1 vial (2.5 mg) by nebulization 4 times daily 360 mL 0    Alpha Lipoic Acid 200 MG CAPS Take 1 capsule by mouth daily (Patient taking differently: Take 1 capsule by mouth every morning) 90 capsule 3    amLODIPine (NORVASC) 10 MG tablet Take 1 tablet (10 mg) by mouth daily 90 tablet 3    ASPIRIN LOW  DOSE 81 MG EC tablet TAKE 1 TABLET DAILY (Patient taking differently: Take 81 mg by mouth every morning) 90 tablet 2    buPROPion (WELLBUTRIN XL) 300 MG 24 hr tablet Take 1 tablet (300 mg) by mouth every morning 90 tablet 3    cetirizine (ZYRTEC) 10 MG tablet Take 1 tablet (10 mg) by mouth every morning 30 tablet 11    Continuous Blood Gluc Sensor (FREESTYLE GIOVANNI 2 SENSOR) MISC 1 each every 14 days For use with Freestyle Giovanni 2  for continuous monitioring of blood glucose levels. Replace sensor every 14 days. 2 each 11    cyanocobalamin (VITAMIN B-12) 500 MCG tablet Take 1 tablet (500 mcg) by mouth daily (Patient taking differently: Take 500 mcg by mouth every morning) 90 tablet 1    doxycycline hyclate (VIBRA-TABS) 100 MG tablet Take 1 tablet (100 mg) by mouth 2 times daily (Patient taking differently: Take 100 mg by mouth every morning) 10 tablet 1    empagliflozin (JARDIANCE) 25 MG TABS tablet Take 1 tablet (25 mg) by mouth daily 90 tablet 0    EPINEPHrine (ANY BX GENERIC EQUIV) 0.3 MG/0.3ML injection 2-pack Inject 0.3 mLs (0.3 mg) into the muscle as needed for anaphylaxis (related to bee stings) May repeat one time in 5-15 minutes if response to initial dose is inadequate. 2 each 1    fluticasone (FLONASE) 50 MCG/ACT nasal spray Spray 1 spray into both nostrils 2 times daily Use at night before bed 15.8 mL 3    GLUCOSAMINE-CHONDROITIN -400 MG tablet TAKE 1 TABLET DAILY (Patient taking differently: Take 1 tablet by mouth every evening) 90 tablet 3    insulin pen needle (BD MARCIO U/F) 32G X 4 MM miscellaneous Use 1 pen needle daily or as directed. 100 each 1    ketorolac (ACULAR) 0.5 % ophthalmic solution Place 1 drop into the right eye 4 times daily 5 mL 0    ketotifen (ZADITOR) 0.025 % ophthalmic solution Place 1 drop into both eyes 2 times daily      moxifloxacin (VIGAMOX) 0.5 % ophthalmic solution Place 1 drop into the right eye 4 times daily 3 mL 0    multivitamin w/minerals (THERA-VIT-M)  tablet Take 1 tablet by mouth daily (Patient taking differently: Take 1 tablet by mouth every morning) 30 tablet 11    nicotine (NICODERM CQ) 14 MG/24HR 24 hr patch Place 1 patch onto the skin every 24 hours      nicotine (NICORETTE) 4 MG lozenge Place 1 lozenge (4 mg) inside cheek every hour as needed for smoking cessation 100 lozenge 0    nicotine (NICOTROL) 10 MG inhaler Use 1 cartridge as needed for urge to smoke by puffing over course of 20min.  Use 6-16 cart/day; reduce number of cart/day over 6-12 weeks. 160 each 0    olopatadine (PATADAY) 0.2 % ophthalmic solution Place 0.05 mLs (1 drop) into both eyes daily 2.5 mL 11    omega-3 acid ethyl esters (LOVAZA) 1 g capsule Take 2 capsules (2 g) by mouth 2 times daily (Patient taking differently: Take 1 g by mouth 2 times daily Take 2 capsules (2 g) by mouth 2 times daily) 360 capsule 3    omeprazole (PRILOSEC) 20 MG DR capsule TAKE 1 CAPSULE TWICE A  capsule 3    polyethylene glycol-propylene glycol (SYSTANE) 0.4-0.3 % SOLN ophthalmic solution Place 1 drop into both eyes 4 times daily 5 mL 11    polyethylene glycol-propylene glycol (SYSTANE) 0.4-0.3 % SOLN ophthalmic solution Place 1 drop into both eyes 4 times daily 5 mL 11    prednisoLONE acetate (PRED FORTE) 1 % ophthalmic suspension Place 1 drop into the right eye 4 times daily 5 mL 0    predniSONE (DELTASONE) 20 MG tablet Take 2 tablets (40 mg) by mouth daily 10 tablet 3    pregabalin (LYRICA) 150 MG capsule TAKE 1 CAPSULE(150 MG) BY MOUTH TWICE DAILY 90 capsule 1    roflumilast (DALIRESP) 250 MCG TABS tablet Take 1 tablet (250 mcg) by mouth daily for 28 days, THEN 2 tablets (500 mcg) daily for 62 days. 152 tablet 0    semaglutide (OZEMPIC, 0.25 OR 0.5 MG/DOSE,) 2 MG/3ML pen Inject 0.5 mg Subcutaneous once a week 3 mL 11    sodium chloride (AMBROSE 128) 5 % ophthalmic solution Place 1-2 drops Into the left eye 4 times daily 15 mL 0    STATIN NOT PRESCRIBED (INTENTIONAL) Please choose reason not prescribed  "from choices below.      TRELEGY ELLIPTA 200-62.5-25 MCG/ACT oral inhaler USE 1 INHALATION DAILY 28 each 5    acetylcysteine (MUCOMYST) 10 % nebulizer solution Inhale 4 mLs into the lungs 4 times daily (Patient not taking: Reported on 9/18/2023) 480 mL 0     No facility-administered encounter medications on file as of 9/18/2023.        Allergies   Allergen Reactions    Bee Venom Anaphylaxis    Interferons Dermatitis    Penicillins Hives    Aspirin      325mg     Colon Care         Review of systems:  A full 10 point review of systems was obtained and was negative except for the pertinent positives and negatives stated within the HPI.    Objective Findings:   Physical Exam:    Constitutional: /63   Pulse 108   Ht 1.676 m (5' 6\")   Wt 94.9 kg (209 lb 4.8 oz)   SpO2 91%   BMI 33.78 kg/m    General: Alert, cooperative, no distress, well-appearing. Uses walker.   Head: Atraumatic, normocephalic, no obvious abnormalities   Eyes: Sclera anicteric, no obvious conjunctival hemorrhage   Nose: Nares normal, no obvious malformation, no obvious rhinorrhea   Respiratory: Resting comfortably, no apparent distress, no cough.   Gastrointestinal: Soft, non distended  Skin: No jaundice, no obvious rash  Neurologic: AAOx3, no obvious neurologic abnormality  Psychiatric: Normal Affect, appropriate mood  Extremities: No obvious edema, no obvious malformation     Labs, Radiology, Pathology     Lab Results   Component Value Date    WBC 6.1 05/25/2023    WBC 8.5 02/24/2023    WBC 8.7 02/23/2023    HGB 13.8 05/25/2023    HGB 13.0 02/24/2023    HGB 14.2 02/23/2023     05/25/2023     02/24/2023     02/23/2023    CHOL 251 (H) 01/27/2022    CHOL 224 (H) 01/11/2022    TRIG 127 01/27/2022    TRIG 102 01/11/2022    HDL 58 01/27/2022    HDL 55 01/11/2022    ALT 9 (L) 05/25/2023    ALT 10 04/17/2023    ALT 15 02/20/2023    AST 14 05/25/2023    AST 14 04/17/2023    AST 32 02/20/2023     08/23/2023     " 05/25/2023     04/17/2023    BUN 11.2 08/23/2023    BUN 8.1 05/25/2023    BUN 7.3 (L) 04/17/2023    CO2 31 (H) 08/23/2023    CO2 32 (H) 05/25/2023    CO2 36 (H) 04/17/2023    TSH 2.65 08/31/2022    TSH 1.50 02/07/2008    INR 1.02 02/03/2023    INR 1.02 02/23/2021    INR 0.97 04/30/2008        Liver Function Studies -   Recent Labs   Lab Test 05/25/23  0909   PROTTOTAL 7.0   ALBUMIN 4.0   BILITOTAL 0.3   ALKPHOS 89   AST 14   ALT 9*          Patient Active Problem List    Diagnosis Date Noted    Combined forms of age-related cataract of both eyes 05/31/2023     Priority: Medium     Added automatically from request for surgery [id number]      Acute and chronic respiratory failure with hypoxia (H) 01/01/2023     Priority: Medium    Gastroesophageal reflux disease without esophagitis 05/24/2022     Priority: Medium    Esophageal dysphagia 05/24/2022     Priority: Medium    Shortness of breath 05/21/2022     Priority: Medium    Chest pain, unspecified type 05/21/2022     Priority: Medium    Hypokalemia 05/21/2022     Priority: Medium    Hypoxia 05/20/2022     Priority: Medium    Diabetic neuropathy (H) 11/18/2021     Priority: Medium    Malnutrition (H) 06/25/2021     Priority: Medium    Chronic obstructive pulmonary disease, unspecified COPD type (H) 02/23/2021     Priority: Medium    Primary lung adenocarcinoma (H) STAGE: IA2 [id number] poorly diff adeno RLL, then [id number] IA2 suspected NSCLC RUL, medically inoperable  12/15/2020     Priority: Medium    Pulmonary emphysema (H) 09/22/2020     Priority: Medium    Type 2 diabetes mellitus without complication, without long-term current use of insulin (H) 01/09/2019     Priority: Medium    Cervical stenosis (uterine cervix) 03/18/2011     Priority: Medium    Vaginitis 03/14/2011     Priority: Medium    Postmenopausal bleeding 03/14/2011     Priority: Medium      Assessment and Plan   Assessment/Plan:    Jenn Aparicio is a 68 year old female with significant past medical  history pertinent for COPD, adenocarcinoma of the lung, acute on chronic hypoxic respiratory failure, DM type II complicated by diabetic neuropathy, who is presenting as a follow up patient however as a new patient to myself with a chief complaint of GERD .    # Chronic Dysphagia - Resolved  # GERD      Prior Evaluation Includes:    5/20/2022 esophagram with small hiatal hernia with spontaneous reflux to the level of the clavicle and tertiary contractions     6/28/2023 normal high-resolution manometry study    8/8/2023 EGD with dilation of the cricopharyngeus to 16 mm with a TTS balloon.  Findings also notable for 2 cm hiatal hernia.  There was mild patchy erythematous mucosa within the gastric body/antrum.  Biopsies with mild chronic inactive gastritis. Negative for H. pylori and intestinal metaplasia.    Today Jenn reports that symptoms of dysphagia have resolved since EGD with dilation 8/8/2023.  She now reports daily symptoms of heartburn and regurgitation occurring after each meal since the initiation of Ozempic.  It should be of note that she also has findings of a 2cm hiatal hernia on recent EGD resulting in a disruption of the reflux barrier. Emphasis on weight loss and the impacts this can have on both the regression of a small hiatal hernia and benefits towards GERD symptoms.      - Escalate Omeprazole to 40 mg twice daily, 30 - 60 minutes prior to meals for 8 - 12 weeks. With plans to deescalate to Omeprazole 20mg once daily.   - Reflux lifestyle modifications as directed within the AVS  - Discussion with prescribing provider regarding Ozempic and increase in reflux symptoms       Follow up plan:   Return to clinic 4 months and as needed.    The risks and benefits of my recommendations, as well as other treatment options were discussed with the patient and any available family today. All questions were answered.     Follow up: As planned above. Today, I personally spent 31 minutes in direct face to face  time with the patient, of which greater than 50% of the time was spent in patient education and counseling as described above. Approximately 12 minutes were spent on indirect care associated with the patient's consultation including but not limited to review of: patient medical records to date, clinic visits, hospital records, lab results, imaging studies, procedural documentation, and coordinating care with other providers. The findings from this review are summarized in the above note. All of the above accounted for a cumulative time of 43 minutes and was performed on the date of service.     The patient verbalized understanding of the plan and was appreciative for the time spent and information provided during the office visit.       Documentation assisted by voice recognition and documentation system.        Again, thank you for allowing me to participate in the care of your patient.      Sincerely,    Gabriela Damon PA-C

## 2023-09-18 NOTE — NURSING NOTE
"Chief Complaint   Patient presents with    Follow Up       Vitals:    09/18/23 1349   BP: 113/63   Pulse: 108   SpO2: 91%   Weight: 94.9 kg (209 lb 4.8 oz)   Height: 1.676 m (5' 6\")       Body mass index is 33.78 kg/m .    Radha Coulter MA    "

## 2023-09-18 NOTE — PROGRESS NOTES
Gastroenterology Visit for: Jenn Aparicio 1955   MRN: [id number]     Reason for Visit:  chief complaint    Referred by: Self  / No address on file  Patient Care Team:  Mitzi Nettles APRN CNP as PCP - General (Nurse Practitioner)  Zarina Leung MD as MD (Internal Medicine-Hematology & Oncology)  Latesha Christianson, RN as Clinic Care Coordinator  Zarina Leung MD as Assigned Cancer Care Provider  Mitzi Nettles APRN CNP as Assigned PCP  Lynda Spann MD as Fellow (Pulmonary Disease)  Earl Mcgrath [facility] as Pharmacist (Pharmacist Clinician- Clinical Pharmacy Specialist)  Travis Briseno MD as Assigned Gastroenterology Provider  La Nena Shabazz MD as MD (Endocrinology, Diabetes, and Metabolism)  Nithya Narvaez RN as Diabetes Educator (Diabetes Education)  Nithya Narvaez RN as Diabetes Educator (Diabetes Education)  La Nena Shabazz MD as Assigned Endocrinology Provider  Kush Santamaria MD as MD (Internal Medicine)  Kailee Moralez MD as Resident (Student in organized health care education/training program)  Gab Walden MD as Hospitalist (Internal Medicine)  Re Keita MD as MD (Ophthalmology)  Samy Prasad [facility] as Assigned MTM Pharmacist  Earl Ureña MD as Assigned Pulmonology Provider  Re Keita MD as Assigned Surgical Provider  Ana Davey, RN as Specialty Care Coordinator (Hematology & Oncology)    History of Present Illness:   Jenn Aparicio is a 68 year old female with significant past medical history pertinent for COPD, adenocarcinoma of the lung, acute on chronic hypoxic respiratory failure, DM type II complicated by diabetic neuropathy, who is presenting as a follow up patient however as a new patient to myself with a chief complaint of GERD.    Interval History September 18, 2023:    CBC CMP 5/2023 WNL.     Since the endoscopy 8/8/2023 she has had no problems with dysphagia.     Currently she is experiencing heartburn/regurgitation daily after each  meal. Currently she is taking Omeprazole 20mg twice daily 30 - 60 minutes prior to meals.  Noting lemon, chocolate are a dietary trigger for reflux.     Symptoms of reflux have worsened since initiation of Ozempic.     Denies weight loss, nausea, emesis, dysphagia, odynophagia, abdominal pain, diarrhea, constipation (< 3 stools per week), nocturnal stooling, incontinence of feces, melena, hematochezia and BRBR.     Brother possibly had pancreatic cancer? Patient is not certain to the details    No additional family history or GI related malignancy (esophageal, gastric, pancreatic, liver or colon).     ---------------------------------------------------------------------------------------------------------------------------------------------------------------    7/2023 Dr. BRITTANY RECIO    History of Present Illness:   This is a patient evidence following for the last few years with severe acid reflux associate with emphysema also dysphagia which has been difficult to pin down she had initially an esophagram which are demonstrated some tertiary contractions and EGD follow-up of this which did not demonstrate any abnormalities with exception of a hiatal hernia.  The patient went on to have an esophageal manometry recently which is read as normal.  The patient's continue to have difficulty swallowing sometimes regurgitating food back up is not sure from where when I talk about her chest or areas that she feels sensations it is difficult for her to answer that.  She sometimes feels like she has a sore throat and cannot swallow.    Esophageal Questionnaire(s)    BEDQ Questionnaire      6/28/2023     2:12 PM   BEDQ Questionnaire: How Often Have You Had the Following?   Trouble eating solid food (meat, bread, vegetables) 4   Trouble eating soft foods (yogurt, jello, pudding) 0   Trouble swallowing liquids 4   Pain while swallowing 0   Coughing or choking while swallowing foods or liquids 2   Total Score: 10         6/28/2023      2:12 PM   BEDQ Questionnaire: Discomfort/Pain Ratings   Eating solid food (meat, bread, vegetables) 0   Eating soft foods (yogurt, jello, pudding) 0   Drinking liquid 0   Total Score: 0       Eckardt Questionnaire      6/28/2023     2:12 PM   Eckardt Questionnaire   Dysphagia 2   Regurgitation 3   Retrosternal Pain 0   Weight Loss (kg) 0   Total Score:  5       Promis 10 Questionnaire       No data to display                STUDIES & PROCEDURES:    EGD:     8/8/2023 Dr. Rodrigues  Findings:       There was pooling of thin secretions in the posterior        oropharynx/hypopharynx and in the vallecula. This was suctioned by the        anesthesia team repeatedly throughout the procedure        The examined esophagus was normal.        No endoscopic abnormality was evident in the esophagus to explain the        patient's complaint of dysphagia. It was decided, however, to proceed        with dilation at the cricopharyngeus. A TTS dilator was passed through        the scope. Dilation with 11-12-13.5-15-16mm dilator was performed. The        dilation site was examined and showed no change. Estimated blood loss:        none.        The Z-line was regular and was found 38 cm from the incisors.        A 2 cm hiatal hernia was found. The proximal extent of the gastric folds        (end of tubular esophagus) was 38 cm from the incisors. The hiatal        narrowing was 40 cm from the incisors.        The gastroesophageal flap valve was visualized endoscopically and        classified as Hill Grade III (minimal fold, loose to endoscope, hiatal        hernia likely).        Patchy moderately erythematous mucosa without bleeding was found in the        gastric body and in the gastric antrum. Biopsies were taken with a cold        forceps for Helicobacter pylori testing. Verification of patient        identification for the specimen was done. Estimated blood loss: none.        The duodenal bulb, first portion of the duodenum, second  portion of the        duodenum and examined duodenum were normal.                                                                                    Impression:            - Normal esophagus.                          - No endoscopic esophageal abnormality to explain                          patient's dysphagia. Esophagus dilated. Dilated.                          - Z-line regular, 38 cm from the incisors.                          - 2 cm hiatal hernia.                          - Gastroesophageal flap valve classified as Hill Grade                          III (minimal fold, loose to endoscope, hiatal hernia                          likely).                          - Erythematous mucosa in the gastric body and antrum.                          Biopsied.                          - Normal duodenal bulb, first portion of the duodenum,                          second portion of the duodenum and examined duodenum.     Final Diagnosis   A. STOMACH, BIOPSY:  - Gastric mucosa with mild chronic inactive gastritis  - Negative for H. pylori-like organisms on routine staining  - Negative for intestinal metaplasia or dysplasia        8/16/2022   Findings:       The examined esophagus was normal. Normal secondary peristalsis        observered, without any tertiary contractions.        Esophagogastric landmarks were identified: the Z-line was found at 37        cm, the gastroesophageal junction was found at 37 cm and the site of        hiatal narrowing was found at 37 cm from the incisors.        A 3 cm hiatal hernia was present.        No other significant abnormalities were identified in a careful        examination of the stomach.        The cardia and gastric fundus were normal on retroflexion.        The in the duodenum was normal.                                                                                    Impression:               - Normal esophagus.                             - Esophagogastric landmarks identified.                              - 3 cm hiatal hernia. Otherwise normal stomach.                             - Normal duodenum.                             - No specimens collected.     Colonoscopy:    [facility]    Findings:   Three sessile polyps were found in the sigmoid colon, descending colon   and appendiceal orifice. The polyps were 3 to 4 mm in size. These polyps   were removed with a cold snare. Resection and retrieval were complete.   Four sessile polyps were found in the transverse colon and cecum. The   polyps were 1 to 2 mm in size. These polyps were removed with a cold   biopsy forceps. Resection and retrieval were complete.   Multiple small and large-mouthed diverticula were found in the sigmoid   colon, descending colon, splenic flexure, hepatic flexure, ascending   colon and cecum.     Impression: - Three 3 to 4 mm polyps in the sigmoid colon, in the   descending colon and at the appendiceal orifice, removed   with a cold snare. Resected and retrieved.   - Four 1 to 2 mm polyps in the transverse colon and in the   cecum, removed with a cold biopsy forceps. Resected and   retrieved.   - Diverticulosis in the sigmoid colon, in the descending   colon, at the splenic flexure, at the hepatic flexure, in   the ascending colon and in the cecum.   Recommendation:    - Discharge patient to home.   - Return to referring physician.   - Repeat colonoscopy in 3 years for surveillance.      Final Diagnosis:   A.  Colon, cecum and appendiceal, polyp, biopsy -   Tubular adenoma, multiple fragments.   B.  Colon, transverse, polyp, biopsy -  Tubular adenoma.   C.  Colon, descending and sigmoid, polyp, biopsy -    Tubular adenoma, multiple fragments.  Sessile serrated adenoma.     EndoFLIP directed at the UES or LES (8cm (EF-325) balloon length or 16cm (EF-322) balloon length):   Date:  8cm balloon  Balloon inflation Balloon pressure CSA (mm^2) DI (mm^2/mmHg) Dmin (mm) Compliance   20 (ladmark ID)        30        54  "       50           16cm balloon  Balloon inflation Balloon pressure CSA (mm^2) DI (mm^2/mmHg) Dmin (mm) Compliance   30 (ladmark ID)        40        50        60        70           High Resolution Manometry:    6/28/2023   Impression    Interpretation / Findings  \"The baseline tone of the lower esophageal sphincter was normotensive. The lower esophageal sphincter was able to relax appropriately as measured by the IRP: supine median 12.3 mm Hg, upright median 10.4 mm Hg. The EGJ morphology was type II. There was peristalsis visible. The DCI, DL, and contractile pattern were/were not within normal limits. There were 10/10 swallows with elevated intrabolus pressure and compartmentalized pressurization. Rapid water swallows were performed with normal augmentation. Rapid Drink Challenge does not indicate an elevated IRP. Supine liquid swallows were performed. Upright liquid swallows were performed. Bolus transit as measured by impedance was complete in 10/10 swallows. This is most consistent with absence of major disorder of esophageal peristalsis. Upper esophageal sphincter pressure somewhat elevated but clinical significant not clear.      Wording of procedure description and interpretation was adapted from [facility] [employer] [facility] [employer] Weekly Motility Conference (2018).\"       Impressions  There was no evidence of major disorder of esophageal peristalsis per [place] 4.0 classification. Consider work up for alternate causes of dysphagia including speech path study with modified barium swallow if not done.       PH/Impedance:     Echever[place]:    CT:    5/25/2023 CT AP W Contrast   IMPRESSION:  1.  Unchanged right upper lobe nodular consolidation. However, there is new nodular/masslike area of consolidation anteriorly and laterally. While this may reflect focal pneumonitis, neoplastic progression would be difficult to exclude. Consider further   evaluation with PET/CT or close imaging surveillance with repeat " CT in 3 months.  2.  Unchanged scarlike right apical nodule, which is of doubtful clinical significance.  3.  Moderate emphysema.  4.  No new lymphadenopathy.    Esophagram:    5/20/2022 Esophagram   FINDINGS:   The esophagus demonstrates tertiary contractions suggestive of  dysmotility. There is no evidence of intrinsic or extrinsic mass or  polyp. No constricting or obstructing lesion is identified. There is  no mucosal ulceration. Mucosal folds are normal in appearance. Small  hiatal hernia. Spontaneous gastroesophageal reflux is visualized at  the level of the clavicle in the RPO position with head elevated  approximately 30 degrees.                                                                      IMPRESSION: Small hiatal hernia with spontaneous gastroesophageal  reflux up to the level of the clavicle is visualized. Tertiary  contractions suggestive of esophageal dysmotility.      FL VSS:     GES:    U/S:     XRAY:    Other:       Prior medical records were reviewed including, but not limited to, notes from referring providers, lab work, radiographic tests, and other diagnostic tests. Pertinent results were summarized above.     History     Past Medical History:   Diagnosis Date    Adenocarcinoma, lung (H)     Asthma     Ectopic pregnancy     Esophageal reflux     Pulmonary emphysema (H)     Very severe FEV1<30% predicted    Type II diabetes mellitus (H)        Past Surgical History:   Procedure Laterality Date    2/8/16                R thoracotomy, RLL lobectomy (Dr. Cunha). Adenocarcinoma, 1.1 cm, assoicated with atypical adenomatous hyperplasia Right 02/08/2016    R thoracotomy, RLL lobectomy (Dr. Cunha). Adenocarcinoma, 1.1 cm, assoicated with atypical adenomatous hyperplasia    BRONCHOSCOPY, WITH BIOPSY, ROBOT ASSISTED N/A 7/19/2023    Procedure: robot assisted Ion BRONCHOSCOPY, WITH BIOPSY;  Surgeon: Patria Garcia MD;  Location: [facility] OR    ENDOBRONCHIAL ULTRASOUND FLEXIBLE N/A 7/19/2023     Procedure: Endobronchial ultrasound flexible;  Surgeon: Patria Garcia MD;  Location:  OR    ESOPHAGOSCOPY, GASTROSCOPY, DUODENOSCOPY (EGD), COMBINED N/A 8/16/2022    Procedure: ESOPHAGOGASTRODUODENOSCOPY (EGD);  Surgeon: Travis Briseno MD;  Location: [facility]    ESOPHAGOSCOPY, GASTROSCOPY, DUODENOSCOPY (EGD), COMBINED N/A 8/8/2023    Procedure: ESOPHAGOGASTRODUODENOSCOPY, WITH BIOPSY;  Surgeon: Chao Rodrigues DO;  Location:  GI    ORTHOPEDIC SURGERY      PHACOEMULSIFICATION CLEAR CORNEA WITH STANDARD INTRAOCULAR LENS IMPLANT Left 8/24/2023    Procedure: LEFT EYE PHACOEMULSIFICATION, CATARACT, WITH INTRAOCULAR LENS IMPLANT;  Surgeon: Re Keita MD;  Location: [facility] OR    PHACOEMULSIFICATION CLEAR CORNEA WITH STANDARD INTRAOCULAR LENS IMPLANT Right 9/5/2023    Procedure: RIGHT EYE PHACOEMULSIFICATION, CATARACT, WITH INTRAOCULAR LENS IMPLANT;  Surgeon: Re Keita MD;  Location: [facility] OR       Social History     Socioeconomic History    Marital status: Single     Spouse name: Not on file    Number of children: Not on file    Years of education: Not on file    Highest education level: Not on file   Occupational History    Not on file   Tobacco Use    Smoking status: Former     Packs/day: 0.25     Types: Cigarettes    Smokeless tobacco: Never    Tobacco comments:     01/02/2023 Patient using 14 mg patch, wants to have prescription for Nicotrol inhaler, took workbook   Substance and Sexual Activity    Alcohol use: Not Currently    Drug use: No    Sexual activity: Not Currently     Partners: Male   Other Topics Concern    Not on file   Social History Narrative    Ms. Aparicio lives in an apartment complex by herself as of May 2022. She has frequent smoke exposure from neighbors even when she is not smoking herself.      Social Determinants of Health     Financial Resource Strain: Not on file   Food Insecurity: Not on file   Transportation Needs: Not on file   Physical Activity: Not on file   Stress: Not on file    Social Connections: Not on file   Intimate Partner Violence: Not At Risk (3/1/2023)    Humiliation, Afraid, Rape, and Kick questionnaire     Fear of Current or Ex-Partner: No     Emotionally Abused: No     Physically Abused: No     Sexually Abused: No   Housing Stability: Not on file       Family History   Problem Relation Age of Onset    Thyroid Disease Mother     Cerebrovascular Disease Mother     Hypertension Mother     Hypertension Father     Glaucoma Father     Cancer Sister     Lung Cancer Sister     Diabetes Brother     Cancer Brother     Diabetes Brother     Cancer Brother     Diabetes Brother     Deep Vein Thrombosis (DVT) Daughter     Depression Daughter     Alcohol/Drug Other         self    Diabetes Other         self    Thyroid Disease Other         self    Asthma Other         self    Macular Degeneration No family hx of     Anesthesia Reaction No family hx of      Family history reviewed and edited as appropriate    Medications and Allergies:     Outpatient Encounter Medications as of 9/18/2023   Medication Sig Dispense Refill    albuterol (PROAIR HFA/PROVENTIL HFA/VENTOLIN HFA) 108 (90 Base) MCG/ACT inhaler USE 1 OR 2 INHALATIONS EVERY 4 HOURS AS NEEDED (Patient taking differently: Inhale 1 puff into the lungs daily USE 1 OR 2 INHALATIONS EVERY 4 HOURS AS NEEDED) 17 g 3    albuterol (PROVENTIL) (2.5 MG/3ML) 0.083% neb solution Take 1 vial (2.5 mg) by nebulization 4 times daily 360 mL 0    Alpha Lipoic Acid 200 MG CAPS Take 1 capsule by mouth daily (Patient taking differently: Take 1 capsule by mouth every morning) 90 capsule 3    amLODIPine (NORVASC) 10 MG tablet Take 1 tablet (10 mg) by mouth daily 90 tablet 3    ASPIRIN LOW DOSE 81 MG EC tablet TAKE 1 TABLET DAILY (Patient taking differently: Take 81 mg by mouth every morning) 90 tablet 2    buPROPion (WELLBUTRIN XL) 300 MG 24 hr tablet Take 1 tablet (300 mg) by mouth every morning 90 tablet 3    cetirizine (ZYRTEC) 10 MG tablet Take 1 tablet (10  mg) by mouth every morning 30 tablet 11    Continuous Blood Gluc Sensor (FREESTYLE GIOVANNI 2 SENSOR) MISC 1 each every 14 days For use with Freestyle Giovanni 2  for continuous monitioring of blood glucose levels. Replace sensor every 14 days. 2 each 11    cyanocobalamin (VITAMIN B-12) 500 MCG tablet Take 1 tablet (500 mcg) by mouth daily (Patient taking differently: Take 500 mcg by mouth every morning) 90 tablet 1    doxycycline hyclate (VIBRA-TABS) 100 MG tablet Take 1 tablet (100 mg) by mouth 2 times daily (Patient taking differently: Take 100 mg by mouth every morning) 10 tablet 1    empagliflozin (JARDIANCE) 25 MG TABS tablet Take 1 tablet (25 mg) by mouth daily 90 tablet 0    EPINEPHrine (ANY BX GENERIC EQUIV) 0.3 MG/0.3ML injection 2-pack Inject 0.3 mLs (0.3 mg) into the muscle as needed for anaphylaxis (related to bee stings) May repeat one time in 5-15 minutes if response to initial dose is inadequate. 2 each 1    fluticasone (FLONASE) 50 MCG/ACT nasal spray Spray 1 spray into both nostrils 2 times daily Use at night before bed 15.8 mL 3    GLUCOSAMINE-CHONDROITIN -400 MG tablet TAKE 1 TABLET DAILY (Patient taking differently: Take 1 tablet by mouth every evening) 90 tablet 3    insulin pen needle (BD MARCIO U/F) 32G X 4 MM miscellaneous Use 1 pen needle daily or as directed. 100 each 1    ketorolac (ACULAR) 0.5 % ophthalmic solution Place 1 drop into the right eye 4 times daily 5 mL 0    ketotifen (ZADITOR) 0.025 % ophthalmic solution Place 1 drop into both eyes 2 times daily      moxifloxacin (VIGAMOX) 0.5 % ophthalmic solution Place 1 drop into the right eye 4 times daily 3 mL 0    multivitamin w/minerals (THERA-VIT-M) tablet Take 1 tablet by mouth daily (Patient taking differently: Take 1 tablet by mouth every morning) 30 tablet 11    nicotine (NICODERM CQ) 14 MG/24HR 24 hr patch Place 1 patch onto the skin every 24 hours      nicotine (NICORETTE) 4 MG lozenge Place 1 lozenge (4 mg) inside cheek  every hour as needed for smoking cessation 100 lozenge 0    nicotine (NICOTROL) 10 MG inhaler Use 1 cartridge as needed for urge to smoke by puffing over course of 20min.  Use 6-16 cart/day; reduce number of cart/day over 6-12 weeks. 160 each 0    olopatadine (PATADAY) 0.2 % ophthalmic solution Place 0.05 mLs (1 drop) into both eyes daily 2.5 mL 11    omega-3 acid ethyl esters (LOVAZA) 1 g capsule Take 2 capsules (2 g) by mouth 2 times daily (Patient taking differently: Take 1 g by mouth 2 times daily Take 2 capsules (2 g) by mouth 2 times daily) 360 capsule 3    omeprazole (PRILOSEC) 20 MG DR capsule TAKE 1 CAPSULE TWICE A  capsule 3    polyethylene glycol-propylene glycol (SYSTANE) 0.4-0.3 % SOLN ophthalmic solution Place 1 drop into both eyes 4 times daily 5 mL 11    polyethylene glycol-propylene glycol (SYSTANE) 0.4-0.3 % SOLN ophthalmic solution Place 1 drop into both eyes 4 times daily 5 mL 11    prednisoLONE acetate (PRED FORTE) 1 % ophthalmic suspension Place 1 drop into the right eye 4 times daily 5 mL 0    predniSONE (DELTASONE) 20 MG tablet Take 2 tablets (40 mg) by mouth daily 10 tablet 3    pregabalin (LYRICA) 150 MG capsule TAKE 1 CAPSULE(150 MG) BY MOUTH TWICE DAILY 90 capsule 1    roflumilast (DALIRESP) 250 MCG TABS tablet Take 1 tablet (250 mcg) by mouth daily for 28 days, THEN 2 tablets (500 mcg) daily for 62 days. 152 tablet 0    semaglutide (OZEMPIC, 0.25 OR 0.5 MG/DOSE,) 2 MG/3ML pen Inject 0.5 mg Subcutaneous once a week 3 mL 11    sodium chloride (AMBROSE 128) 5 % ophthalmic solution Place 1-2 drops Into the left eye 4 times daily 15 mL 0    STATIN NOT PRESCRIBED (INTENTIONAL) Please choose reason not prescribed from choices below.      TRELEGY ELLIPTA 200-62.5-25 MCG/ACT oral inhaler USE 1 INHALATION DAILY 28 each 5    acetylcysteine (MUCOMYST) 10 % nebulizer solution Inhale 4 mLs into the lungs 4 times daily (Patient not taking: Reported on 9/18/2023) 480 mL 0     No  "facility-administered encounter medications on file as of 9/18/2023.        Allergies   Allergen Reactions    Bee Venom Anaphylaxis    Interferons Dermatitis    Penicillins Hives    Aspirin      325mg     Colon Care         Review of systems:  A full 10 point review of systems was obtained and was negative except for the pertinent positives and negatives stated within the HPI.    Objective Findings:   Physical Exam:    Constitutional: /63   Pulse 108   Ht 1.676 m (5' 6\")   Wt 94.9 kg (209 lb 4.8 oz)   SpO2 91%   BMI 33.78 kg/m    General: Alert, cooperative, no distress, well-appearing. Uses walker.   Head: Atraumatic, normocephalic, no obvious abnormalities   Eyes: Sclera anicteric, no obvious conjunctival hemorrhage   Nose: Nares normal, no obvious malformation, no obvious rhinorrhea   Respiratory: Resting comfortably, no apparent distress, no cough.   Gastrointestinal: Soft, non distended  Skin: No jaundice, no obvious rash  Neurologic: AAOx3, no obvious neurologic abnormality  Psychiatric: Normal Affect, appropriate mood  Extremities: No obvious edema, no obvious malformation     Labs, Radiology, Pathology     Lab Results   Component Value Date    WBC 6.1 05/25/2023    WBC 8.5 02/24/2023    WBC 8.7 02/23/2023    HGB 13.8 05/25/2023    HGB 13.0 02/24/2023    HGB 14.2 02/23/2023     05/25/2023     02/24/2023     02/23/2023    CHOL 251 (H) 01/27/2022    CHOL 224 (H) 01/11/2022    TRIG 127 01/27/2022    TRIG 102 01/11/2022    HDL 58 01/27/2022    HDL 55 01/11/2022    ALT 9 (L) 05/25/2023    ALT 10 04/17/2023    ALT 15 02/20/2023    AST 14 05/25/2023    AST 14 04/17/2023    AST 32 02/20/2023     08/23/2023     05/25/2023     04/17/2023    BUN 11.2 08/23/2023    BUN 8.1 05/25/2023    BUN 7.3 (L) 04/17/2023    CO2 31 (H) 08/23/2023    CO2 32 (H) 05/25/2023    CO2 36 (H) 04/17/2023    TSH 2.65 08/31/2022    TSH 1.50 02/07/2008    INR 1.02 02/03/2023    INR 1.02 " 02/23/2021    INR 0.97 04/30/2008        Liver Function Studies -   Recent Labs   Lab Test 05/25/23  0909   PROTTOTAL 7.0   ALBUMIN 4.0   BILITOTAL 0.3   ALKPHOS 89   AST 14   ALT 9*          Patient Active Problem List    Diagnosis Date Noted    Combined forms of age-related cataract of both eyes 05/31/2023     Priority: Medium     Added automatically from request for surgery [id number]      Acute and chronic respiratory failure with hypoxia (H) 01/01/2023     Priority: Medium    Gastroesophageal reflux disease without esophagitis 05/24/2022     Priority: Medium    Esophageal dysphagia 05/24/2022     Priority: Medium    Shortness of breath 05/21/2022     Priority: Medium    Chest pain, unspecified type 05/21/2022     Priority: Medium    Hypokalemia 05/21/2022     Priority: Medium    Hypoxia 05/20/2022     Priority: Medium    Diabetic neuropathy (H) 11/18/2021     Priority: Medium    Malnutrition (H) 06/25/2021     Priority: Medium    Chronic obstructive pulmonary disease, unspecified COPD type (H) 02/23/2021     Priority: Medium    Primary lung adenocarcinoma (H) STAGE: IA2 [id number] poorly diff adeno RLL, then [id number] IA2 suspected NSCLC RUL, medically inoperable  12/15/2020     Priority: Medium    Pulmonary emphysema (H) 09/22/2020     Priority: Medium    Type 2 diabetes mellitus without complication, without long-term current use of insulin (H) 01/09/2019     Priority: Medium    Cervical stenosis (uterine cervix) 03/18/2011     Priority: Medium    Vaginitis 03/14/2011     Priority: Medium    Postmenopausal bleeding 03/14/2011     Priority: Medium      Assessment and Plan   Assessment/Plan:    Jenn Aparicio is a 68 year old female with significant past medical history pertinent for COPD, adenocarcinoma of the lung, acute on chronic hypoxic respiratory failure, DM type II complicated by diabetic neuropathy, who is presenting as a follow up patient however as a new patient to myself with a chief complaint of GERD .    #  Chronic Dysphagia - Resolved  # GERD      Prior Evaluation Includes:    5/20/2022 esophagram with small hiatal hernia with spontaneous reflux to the level of the clavicle and tertiary contractions     6/28/2023 normal high-resolution manometry study    8/8/2023 EGD with dilation of the cricopharyngeus to 16 mm with a TTS balloon.  Findings also notable for 2 cm hiatal hernia.  There was mild patchy erythematous mucosa within the gastric body/antrum.  Biopsies with mild chronic inactive gastritis. Negative for H. pylori and intestinal metaplasia.    Today Jenn reports that symptoms of dysphagia have resolved since EGD with dilation 8/8/2023.  She now reports daily symptoms of heartburn and regurgitation occurring after each meal since the initiation of Ozempic. It should be of note that she also has findings of a 2cm hiatal hernia on recent EGD resulting in a disruption of the reflux barrier. Emphasis on weight loss and the impacts this can have on both the regression of a small hiatal hernia and benefits towards GERD symptoms.      If symptoms of dysphagia return would consider repeat upper endoscopy with dilation.     - Escalate Omeprazole to 40 mg twice daily, 30 - 60 minutes prior to meals for 8 - 12 weeks. With plans to deescalate to Omeprazole 20mg once daily.   - Reflux lifestyle modifications as directed within the AVS  - Discussion with prescribing provider regarding Ozempic and increase in reflux symptoms     #Colorectal Cancer Screening   Colonoscopy 2018 with >3 adenomatous polyps. Recall 3 years.  No family history of CRC. Per the most recent update by the [place] Multi-Society Task Force on Colorectal Cancer recall should be 3 years, 2021 or sooner if otherwise indicated.     - Colonoscopy for colon cancer screening     Follow up plan:   Return to clinic 4 months and as needed.    The risks and benefits of my recommendations, as well as other treatment options were discussed with the patient and any  available family today. All questions were answered.     Follow up: As planned above. Today, I personally spent 31 minutes in direct face to face time with the patient, of which greater than 50% of the time was spent in patient education and counseling as described above. Approximately 12 minutes were spent on indirect care associated with the patient's consultation including but not limited to review of: patient medical records to date, clinic visits, hospital records, lab results, imaging studies, procedural documentation, and coordinating care with other providers. The findings from this review are summarized in the above note. All of the above accounted for a cumulative time of 43 minutes and was performed on the date of service.     The patient verbalized understanding of the plan and was appreciative for the time spent and information provided during the office visit.           Gabriela Damon PA-C  Division of Gastroenterology, Hepatology, and Nutrition  [facility]       Documentation assisted by voice recognition and documentation system.

## 2023-09-18 NOTE — PATIENT INSTRUCTIONS
It was a pleasure taking care of you today.  I've included a brief summary of our discussion and care plan from today's visit below.  Please review this information with your primary care provider.  _______________________________________________________________________    My recommendations are summarized as follows:    - Escalate Omeprazole to 40 mg twice daily, 30 - 60 minutes prior to meals for 8 - 12 weeks. With plans to deescalate to Omeprazole 20mg once daily.   - Discussion with prescribing provider regarding Ozempic and increase in reflux symptoms     Gastroesophageal Reflux Disease (GERD) Lifestyle Modifications:   If taking acid suppression therapy (PPI ie Pantoprazole, Lansoprazole, Omeprazole, Esomeprazole, Rabeprazole, Dexlansoprazole) it should be taken 30 - 60 minutes prior to meals on an empty stomach to have maximum effect  Avoid triggers for reflux such as coffee, chocolate, carbonated beverages, spicy foods, acidic foods (tomato based/citrus and foods with high fat content   Abstinence from alcohol and cessation of all tobacco products is recommended   Studies have shown that weight loss, exercise and maintaining a healthy BMI significantly reduce GERD symptoms   Remain upright while eating and immediately after meals  Do not eat or drink at least 3 hours prior to laying down supine/laying down for bed   Avoid late night/middle of the night snacking    Consider obtaining a wedge pillow or elevating the head end of the bed while sleeping   Avoid sleeping right side down as this can place the lower part of the esophagus/lower esophageal sphincter in a dependent position that favors reflux   Attempting to eat smaller more frequent meals may improve symptoms         To schedule endoscopic procedures you may call: [phone]  To schedule radiology (imaging) tests you may call: [phone]  To schedule an ENT appointment you may call: [phone]    Please call my nurse Nicole ([phone]), Gustavo  ([phone]) with any questions or concerns.      Return to GI Clinic in 3 months to review your progress.    _______________________________________________________________________    Who do I call with any questions after my visit?  Please be in touch if there are any further questions that arise following today's visit.  There are multiple ways to contact your gastroenterology care team.      During business hours, you may reach a Gastroenterology nurse at [phone] and choose option 3.       To schedule or reschedule an appointment, please call [phone].     You can always send a secure message through [employer].  [employer] messages are answered by your nurse or doctor typically within 24 hours.  Please allow extra time on weekends and holidays.      For urgent/emergent questions after business hours, you may reach the on-call GI Fellow by contacting the [facility]  at [phone].     How will I get the results of any tests ordered?    You will receive all of your results.  If you have signed up for [employer], any tests ordered at your visit will be available to you after your physician reviews them.  Typically this takes 1-2 weeks.  If there are urgent results that require a change in your care plan, your physician or nurse will call you to discuss the next steps.      What is [employer]?  [employer] is a secure way for you to access all of your healthcare records from the [facility].  It is a web based computer program, so you can sign on to it from any location.  It also allows you to send secure messages to your care team.  I recommend signing up for [employer] access if you have not already done so and are comfortable with using a computer.      How to I schedule a follow-up visit?  If you did not schedule a follow-up visit today, please call [phone] to schedule a follow-up office visit.      If you feel you received exceptional care and are interested in supporting the  clinical and research goals of Gabriela Damon PA-C or the Division of Gastroenterology, Hepatology, and Nutrition please contact [email or web address] from the [facility] to discuss opportunities to donate.    Sincerely,    Gabriela Damon PA-C  Division of Gastroenterology, Hepatology, and Nutrition  [facility]

## 2023-09-22 ENCOUNTER — OFFICE VISIT (OUTPATIENT)
Dept: OPHTHALMOLOGY | Facility: CLINIC | Age: 68
End: 2023-09-22
Attending: STUDENT IN AN ORGANIZED HEALTH CARE EDUCATION/TRAINING PROGRAM
Payer: COMMERCIAL

## 2023-09-22 DIAGNOSIS — Z96.1 PSEUDOPHAKIA, BOTH EYES: ICD-10-CM

## 2023-09-22 DIAGNOSIS — Z98.890 POSTOPERATIVE EYE STATE: Primary | ICD-10-CM

## 2023-09-22 PROCEDURE — G0463 HOSPITAL OUTPT CLINIC VISIT: HCPCS | Performed by: STUDENT IN AN ORGANIZED HEALTH CARE EDUCATION/TRAINING PROGRAM

## 2023-09-22 PROCEDURE — 99024 POSTOP FOLLOW-UP VISIT: CPT | Performed by: STUDENT IN AN ORGANIZED HEALTH CARE EDUCATION/TRAINING PROGRAM

## 2023-09-22 ASSESSMENT — REFRACTION_MANIFEST
OS_CYLINDER: +1.25
OS_ADD: +2.50
OD_AXIS: 015
OD_SPHERE: PLANO
OD_CYLINDER: +1.00
OS_SPHERE: +0.25
OD_ADD: +2.50
OS_AXIS: 152

## 2023-09-22 ASSESSMENT — TONOMETRY
IOP_METHOD: TONOPEN
OD_IOP_MMHG: 15
OS_IOP_MMHG: 12

## 2023-09-22 ASSESSMENT — PACHYMETRY
OD_CT(UM): 608
OS_CT(UM): 605

## 2023-09-22 ASSESSMENT — SLIT LAMP EXAM - LIDS
COMMENTS: WNL
COMMENTS: WNL

## 2023-09-22 ASSESSMENT — CONF VISUAL FIELD
OS_INFERIOR_NASAL_RESTRICTION: 0
OS_INFERIOR_TEMPORAL_RESTRICTION: 0
OD_INFERIOR_TEMPORAL_RESTRICTION: 0
OD_INFERIOR_NASAL_RESTRICTION: 0
OD_SUPERIOR_TEMPORAL_RESTRICTION: 0
OS_NORMAL: 1
METHOD: COUNTING FINGERS
OD_NORMAL: 1
OS_SUPERIOR_TEMPORAL_RESTRICTION: 0
OS_SUPERIOR_NASAL_RESTRICTION: 0
OD_SUPERIOR_NASAL_RESTRICTION: 0

## 2023-09-22 ASSESSMENT — CUP TO DISC RATIO
OD_RATIO: 0.45
OS_RATIO: 0.4

## 2023-09-22 ASSESSMENT — EXTERNAL EXAM - RIGHT EYE: OD_EXAM: NORMAL

## 2023-09-22 ASSESSMENT — EXTERNAL EXAM - LEFT EYE: OS_EXAM: NORMAL

## 2023-09-22 ASSESSMENT — VISUAL ACUITY
OD_SC: 20/40
OS_SC+: +3
METHOD: SNELLEN - LINEAR
OS_SC: 20/40
METHOD_MR: DIAGNOSTIC MR

## 2023-09-22 NOTE — PATIENT INSTRUCTIONS
"Cataract Surgery Post-Operative Instructions     You can continue the following eye drops starting tonight in the RIGHT eye:     Vigamox (tan cap) 1 drop four times a day to right eye for 1 week, then stop  Ketorolac (grey top) 1 drop two times a day to right eye for 1 week , then 1 drop once a day to right eye for 1 week, then stop  Predforte (pink or white top) 1 drop two times a day to right eye for 1 week , then 1 drop once a day to right eye for 1 week, then stop  OPTIONAL: preservative free artificial tears (refresh, thera tears, systane, etc) 1 drop 4-6 times per day as needed (DO NOT use Visine or Clear Eyes or any eye drop product that states \"red out\")  If on glaucoma medications, then continue regular eye pressure drops  **wait 5-10 minutes between each drop**  **can refrigerate eye drops to reduce burning or stinging sensation**     You can continue the following eye drops starting tonight in the LEFT eye:     Ketorolac (grey top) 1 drop once a day to left eye for 1 week, then stop  Predforte (pink or white top) 1 drop once a day to left eye for 1 week, then stop  Discontinue Azeem 128 5% drops  OPTIONAL: preservative free artificial tears (refresh, thera tears, systane, etc) 1 drop 4-6 times per day as needed (DO NOT use Visine or Clear Eyes or any eye drop product that states \"red out\")  **wait 5-10 minutes between each drop**  **can refrigerate eye drops to reduce burning or stinging sensation**     Please contact Dr Keita at [phone] if any issues arise    "

## 2023-09-22 NOTE — PROGRESS NOTES
HPI       Post Op (Ophthalmology) Right Eye    In right eye.  Associated symptoms include Negative for photophobia, flashes and floaters.  Treatments tried include eye drops.  Pain was noted as 0/10. Additional comments: Post op right eye CE IOL 09/05/2023.  S/p CE IOL left eye 08/24/2023             Comments    Eye meds: vigamox right eye, prednisolone right eye, ketorolac right eye, roger left eye   Dark shadow on both sides of vision each eye, on and off, most noticeable in light.  Vision has improved each eye.    FATIMAH Leger 9/22/2023 10:32 AM                Last edited by Jason Singleotn CO on 9/22/2023 10:32 AM.          Review of systems for the eyes was negative other than the pertinent positives/negatives listed in the HPI.    Ocular Meds: systane TID OU; olopatadine daily OU; postop drops as prescribed    Ocular Hx: refractive error OU; anatomically narrow angles OU; CE/IOL left eye 8/24/23; CE/IOL right eye 9/5/23    FOHx: no family history of glaucoma or blindness    PMHx:     Past Medical History:   Diagnosis Date    Adenocarcinoma, lung (H)     Asthma     Ectopic pregnancy     Esophageal reflux     Pulmonary emphysema (H)     Very severe FEV1<30% predicted    Type II diabetes mellitus (H)        Assessment & Plan     Jenn Aparicio is a 68 year old female with the following diagnoses:    Postoperative eye state  Pseudophakia OD  - Doing well, IOP okay, dedra negative, no signs of infection, happy with results  - discontinue eye shield  - r/b/a of suture removal d/w pt, pt expressed understanding; using 27 gauge needle and sterile jewelers forceps, suture was carefully removed at slit lamp; ofloxacin eye drop was instilled to procedure eye pre and post suture removal  - post-operative drops and taper according to instructions  -- vigamox QID x 1 week to surgical eye  -- predforte BID/daily/stop to procedure eye, taper weekly  -- ketorolac BID/daily/stop to procedure eye, taper weekly  -  Post-operative do's and don'ts reviewed, questions answered  - patient has emergency contact information  - Counseled endophthalmitis/RD/return precautions    Pseudophakia OS  - Doing well, IOP okay, dedra negative, no signs of infection, happy with results  - prior visit with mild corneal edema, pachy 669 --> 605 today (significant improvement, no clinically evident edema)  - prior visit with OCT macula no fluid OS  - post-operative drops and taper according to instructions  -- predforte daily left eye x 1 week  -- ketorolac daily left eye x 1 week  -- discontinue roger 128 5%  - Post-operative do's and don'ts reviewed, questions answered  - patient has emergency contact information  - reviewed importance of medication compliance with patient  - Counseled endophthalmitis/RD/return precautions    Anatomically Narrow Angles OU  - IOP wnl, CVF full to CF OU  - no FHx of glaucoma  - previously on gonio undilated with 4-mirror has steep approach of iris with angles open to PTM/SS most areas, and opens on dynamic gonioscopy OU  - will gonio at post op month 1 visit after cataract surgery OU    T2DM without Retinopathy OU  - strict BP/BG control  - patient understands importance of good blood glucose control in order to reduce the risk of developing diabetic retinopathy  - yearly DFE    Dry Eyes OU  - PFATs QID and prn OU    Refractive error  - hold on refraction until POM1 visit after cataract surgery    Counseled return/RD precautions    Patient disposition:   Return for Follow Up VTD MRx for post op cataract surgery visit, or sooner changes.    Attending Physician Attestation:  Complete documentation of historical and exam elements from today's encounter can be found in the full encounter summary report (not reduplicated in this progress note).  I personally obtained the chief complaint(s) and history of present illness.  I confirmed and edited as necessary the review of systems, past medical/surgical history, family  history, social history, and examination findings as documented by others; and I examined the patient myself.  I personally reviewed the relevant tests, images, and reports as documented above.  I formulated and edited as necessary the assessment and plan and discussed the findings and management plan with the patient and family. . - Re Keita MD

## 2023-09-22 NOTE — NURSING NOTE
Chief Complaints and History of Present Illnesses   Patient presents with    Post Op (Ophthalmology) Right Eye     Post op right eye CE IOL 09/05/2023.  S/p CE IOL left eye 08/24/2023      Chief Complaint(s) and History of Present Illness(es)       Post Op (Ophthalmology) Right Eye              Laterality: right eye    Associated symptoms: Negative for photophobia, flashes and floaters    Treatments tried: eye drops    Pain scale: 0/10    Comments: Post op right eye CE IOL 09/05/2023.  S/p CE IOL left eye 08/24/2023              Comments    Eye meds: vigamox right eye, prednisolone right eye, ketorolac right eye, roger left eye   Dark shadow on both sides of vision each eye, on and off, most noticeable in light.  Vision has improved each eye.    FATIMAH Leger 9/22/2023 10:32 AM

## 2023-09-30 ENCOUNTER — OFFICE VISIT (OUTPATIENT)
Dept: URGENT CARE | Facility: URGENT CARE | Age: 68
End: 2023-09-30
Payer: COMMERCIAL

## 2023-09-30 ENCOUNTER — ANCILLARY PROCEDURE (OUTPATIENT)
Dept: GENERAL RADIOLOGY | Facility: CLINIC | Age: 68
End: 2023-09-30
Attending: EMERGENCY MEDICINE
Payer: COMMERCIAL

## 2023-09-30 VITALS
HEART RATE: 101 BPM | SYSTOLIC BLOOD PRESSURE: 120 MMHG | TEMPERATURE: 97.8 F | DIASTOLIC BLOOD PRESSURE: 71 MMHG | WEIGHT: 198.8 LBS | BODY MASS INDEX: 32.09 KG/M2 | OXYGEN SATURATION: 90 %

## 2023-09-30 DIAGNOSIS — R23.3 ABNORMAL BRUISING: ICD-10-CM

## 2023-09-30 DIAGNOSIS — J44.1 COPD EXACERBATION (H): ICD-10-CM

## 2023-09-30 DIAGNOSIS — J44.1 COPD EXACERBATION (H): Primary | ICD-10-CM

## 2023-09-30 LAB
ALBUMIN SERPL-MCNC: 3.7 G/DL (ref 3.4–5)
ALP SERPL-CCNC: 88 U/L (ref 40–150)
ALT SERPL W P-5'-P-CCNC: 10 U/L (ref 0–50)
ANION GAP SERPL CALCULATED.3IONS-SCNC: 9 MMOL/L (ref 3–14)
AST SERPL W P-5'-P-CCNC: 15 U/L (ref 0–45)
BASOPHILS # BLD AUTO: 0 10E3/UL (ref 0–0.2)
BASOPHILS NFR BLD AUTO: 1 %
BILIRUB SERPL-MCNC: 0.7 MG/DL (ref 0.2–1.3)
BUN SERPL-MCNC: 7 MG/DL (ref 7–30)
CALCIUM SERPL-MCNC: 9.7 MG/DL (ref 8.5–10.1)
CHLORIDE BLD-SCNC: 104 MMOL/L (ref 94–109)
CO2 SERPL-SCNC: 33 MMOL/L (ref 20–32)
CREAT SERPL-MCNC: 0.8 MG/DL (ref 0.52–1.04)
EGFRCR SERPLBLD CKD-EPI 2021: 80 ML/MIN/1.73M2
EOSINOPHIL # BLD AUTO: 0.3 10E3/UL (ref 0–0.7)
EOSINOPHIL NFR BLD AUTO: 4 %
ERYTHROCYTE [DISTWIDTH] IN BLOOD BY AUTOMATED COUNT: 13.2 % (ref 10–15)
GLUCOSE BLD-MCNC: 131 MG/DL (ref 70–99)
HCT VFR BLD AUTO: 44.3 % (ref 35–47)
HGB BLD-MCNC: 13.9 G/DL (ref 11.7–15.7)
IMM GRANULOCYTES # BLD: 0 10E3/UL
IMM GRANULOCYTES NFR BLD: 0 %
LYMPHOCYTES # BLD AUTO: 1.6 10E3/UL (ref 0.8–5.3)
LYMPHOCYTES NFR BLD AUTO: 20 %
MCH RBC QN AUTO: 27.7 PG (ref 26.5–33)
MCHC RBC AUTO-ENTMCNC: 31.4 G/DL (ref 31.5–36.5)
MCV RBC AUTO: 88 FL (ref 78–100)
MONOCYTES # BLD AUTO: 0.6 10E3/UL (ref 0–1.3)
MONOCYTES NFR BLD AUTO: 8 %
NEUTROPHILS # BLD AUTO: 5.5 10E3/UL (ref 1.6–8.3)
NEUTROPHILS NFR BLD AUTO: 67 %
PLATELET # BLD AUTO: 224 10E3/UL (ref 150–450)
POTASSIUM BLD-SCNC: 3.6 MMOL/L (ref 3.4–5.3)
PROT SERPL-MCNC: 8.4 G/DL (ref 6.8–8.8)
RBC # BLD AUTO: 5.01 10E6/UL (ref 3.8–5.2)
SODIUM SERPL-SCNC: 146 MMOL/L (ref 133–144)
WBC # BLD AUTO: 8.1 10E3/UL (ref 4–11)

## 2023-09-30 PROCEDURE — 36415 COLL VENOUS BLD VENIPUNCTURE: CPT | Performed by: EMERGENCY MEDICINE

## 2023-09-30 PROCEDURE — 85025 COMPLETE CBC W/AUTO DIFF WBC: CPT | Performed by: EMERGENCY MEDICINE

## 2023-09-30 PROCEDURE — 80053 COMPREHEN METABOLIC PANEL: CPT | Performed by: EMERGENCY MEDICINE

## 2023-09-30 PROCEDURE — 71046 X-RAY EXAM CHEST 2 VIEWS: CPT | Mod: TC | Performed by: RADIOLOGY

## 2023-09-30 PROCEDURE — 99214 OFFICE O/P EST MOD 30 MIN: CPT | Performed by: EMERGENCY MEDICINE

## 2023-09-30 RX ORDER — AZITHROMYCIN 250 MG/1
TABLET, FILM COATED ORAL
Qty: 6 TABLET | Refills: 0 | Status: SHIPPED | OUTPATIENT
Start: 2023-09-30 | End: 2023-10-05

## 2023-09-30 RX ORDER — IPRATROPIUM BROMIDE AND ALBUTEROL SULFATE 2.5; .5 MG/3ML; MG/3ML
1 SOLUTION RESPIRATORY (INHALATION) EVERY 6 HOURS PRN
Qty: 90 ML | Refills: 1 | Status: ON HOLD | OUTPATIENT
Start: 2023-09-30 | End: 2024-08-16

## 2023-09-30 RX ORDER — PREDNISONE 20 MG/1
40 TABLET ORAL DAILY
Qty: 10 TABLET | Refills: 0 | Status: SHIPPED | OUTPATIENT
Start: 2023-09-30 | End: 2023-10-05

## 2023-09-30 NOTE — PROGRESS NOTES
Assessment & Plan     Diagnosis:    ICD-10-CM    1. COPD exacerbation (H)  J44.1 XR Chest 2 Views     predniSONE (DELTASONE) 20 MG tablet     azithromycin (ZITHROMAX) 250 MG tablet     CBC with platelets and differential     Comprehensive metabolic panel (BMP + Alb, Alk Phos, ALT, AST, Total. Bili, TP)     ipratropium - albuterol 0.5 mg/2.5 mg/3 mL (DUONEB) 0.5-2.5 (3) MG/3ML neb solution     CBC with platelets and differential     Comprehensive metabolic panel (BMP + Alb, Alk Phos, ALT, AST, Total. Bili, TP)      2. Abnormal bruising  R23.3 CBC with platelets and differential     Comprehensive metabolic panel (BMP + Alb, Alk Phos, ALT, AST, Total. Bili, TP)     CBC with platelets and differential     Comprehensive metabolic panel (BMP + Alb, Alk Phos, ALT, AST, Total. Bili, TP)        Medical Decision Making:  Jenn Aparicio is a 68 year old female who presents for evaluation of shortness of breath and wheezing.  Signs and symptoms are consistent with COPD exacerbation.  A broad differential was considered including foreign body, asthma, reactive airway disease, pneumothorax, cardiac equivalent/ACS, viral induced wheezing, allergic phenomena, pneumonia, etc.  Patient feels improved after nebulizer prior to arrival but believes she needs further medications.  CXR obtained here showing no acute infiltrate or effusion per radiology as noted below.    There are no signs at this point of any other serious etiologies including those mentioned above especially acute coronary syndrome. I doubt this is ACS given the classic story of COPD exacerbation given by the patient, the marked wheezing without rales. No signs of pneumonia.  CBC and CMP are grossly unremarkable as noted below. She has had some bruising on her legs with no history of trauma, unclear cause but I have very low concern for sepsis at this juncture given no fever, normal WBC, no acute infiltrate and non-toxic appearance.  O2 has been between 88 and 92% here,  she did turn this down to 1 L because she was running low however, when at 3 L she was satting ~92%.     Given change in sputum production, antibiotics are indicated. She follows up for CT of her chest in ~3 weeks. Will start steroids as well. Patient verbalizes understanding and agreement with the plan to follow up with PCP in 2 days for recheck; understands reasons to go to the ER. All questions answered.       Gregory Hahn PA-C  [facility]    Subjective     Jenn Aparicio is a 68 year old female who presents to clinic today for the following health issues:  Chief Complaint   Patient presents with    Urgent Care    Cough     Been sick for like a week, coughing up thick green and sometimes yellow, tired and really sleepy, want to make sure no pneumonia or bronchitis     Diarrhea    Fatigue       HPI  Patient for the past week has had a cough, bringing up green/yellow phlegm.  Has history of COPD and is on 1 to 3 L of O2 via nasal cannula chronically.  Patient states she has been feeling slightly short of breath, more sleepy than anything.  She states that the cough is getting slightly better but she is still concerned why she is so tired now.  She denies any fevers, chest pain, abdominal pain, diarrhea, nausea, vomiting, confusion or other symptoms.    Review of Systems    See HPI    Objective      Vitals: /71 (BP Location: Left arm, Patient Position: Sitting, Cuff Size: Adult Large)   Pulse 101   Temp 97.8  F (36.6  C) (Tympanic)   Wt 90.2 kg (198 lb 12.8 oz)   SpO2 90%   BMI 32.09 kg/m    Resp: 18    Patient Vitals for the past 24 hrs:   BP Temp Temp src Pulse SpO2 Weight   09/30/23 1415 -- -- -- -- 90 % --   09/30/23 1300 120/71 97.8  F (36.6  C) Tympanic 101 (!) 88 % 90.2 kg (198 lb 12.8 oz)       Vital signs reviewed by: Gregory Hahn PA-C    Physical Exam   Constitutional: Patient is alert and cooperative. No acute distress.  Mouth: Mucous membranes are moist. Normal tongue and  tonsil. Posterior oropharynx is clear.  Eyes: Conjunctivae, EOMI and lids are normal.  Cardiovascular: Regular rate and rhythm  Pulmonary/Chest: Diffuse wheezing throughout all lung fields.  No crackles or rhonchi.  Effort normal.  Able to speak in full sentences, no respiratory distress while on her O2 (nasal cannula).  GI: Abdomen is soft and non-tender throughout. No CVA tenderness bilaterally.  Neurological: Alert and oriented x3.   Skin: No rash noted on visualized skin.  Psychiatric:The patient has a normal mood and affect.     Labs/Imaging:  Results for orders placed or performed in visit on 09/30/23   XR Chest 2 Views     Status: None    Narrative    EXAM: XR CHEST 2 VIEWS  LOCATION: [facility]  DATE: 9/30/2023    INDICATION:  COPD exacerbation (H)  COMPARISON: 7/19/2023      Impression    IMPRESSION: Heart is normal in size. Area of consolidation and/or scarring noted within the right lateral upper lung, similar to prior examination. Consider chest CT if indicated. Small right effusion. No focal consolidation to suggest pneumonia.   Results for orders placed or performed in visit on 09/30/23   Comprehensive metabolic panel (BMP + Alb, Alk Phos, ALT, AST, Total. Bili, TP)     Status: Abnormal   Result Value Ref Range    Sodium 146 (H) 133 - 144 mmol/L    Potassium 3.6 3.4 - 5.3 mmol/L    Chloride 104 94 - 109 mmol/L    Carbon Dioxide (CO2) 33 (H) 20 - 32 mmol/L    Anion Gap 9 3 - 14 mmol/L    Urea Nitrogen 7 7 - 30 mg/dL    Creatinine 0.80 0.52 - 1.04 mg/dL    Calcium 9.7 8.5 - 10.1 mg/dL    Glucose 131 (H) 70 - 99 mg/dL    Alkaline Phosphatase 88 40 - 150 U/L    AST 15 0 - 45 U/L    ALT 10 0 - 50 U/L    Protein Total 8.4 6.8 - 8.8 g/dL    Albumin 3.7 3.4 - 5.0 g/dL    Bilirubin Total 0.7 0.2 - 1.3 mg/dL    GFR Estimate 80 >60 mL/min/1.73m2   CBC with platelets and differential     Status: Abnormal   Result Value Ref Range    WBC Count 8.1 4.0 - 11.0 10e3/uL    RBC Count 5.01 3.80  - 5.20 10e6/uL    Hemoglobin 13.9 11.7 - 15.7 g/dL    Hematocrit 44.3 35.0 - 47.0 %    MCV 88 78 - 100 fL    MCH 27.7 26.5 - 33.0 pg    MCHC 31.4 (L) 31.5 - 36.5 g/dL    RDW 13.2 10.0 - 15.0 %    Platelet Count 224 150 - 450 10e3/uL    % Neutrophils 67 %    % Lymphocytes 20 %    % Monocytes 8 %    % Eosinophils 4 %    % Basophils 1 %    % Immature Granulocytes 0 %    Absolute Neutrophils 5.5 1.6 - 8.3 10e3/uL    Absolute Lymphocytes 1.6 0.8 - 5.3 10e3/uL    Absolute Monocytes 0.6 0.0 - 1.3 10e3/uL    Absolute Eosinophils 0.3 0.0 - 0.7 10e3/uL    Absolute Basophils 0.0 0.0 - 0.2 10e3/uL    Absolute Immature Granulocytes 0.0 <=0.4 10e3/uL   CBC with platelets and differential     Status: Abnormal    Narrative    The following orders were created for panel order CBC with platelets and differential.  Procedure                               Abnormality         Status                     ---------                               -----------         ------                     CBC with platelets and d...[[id number]]  Abnormal            Final result                 Please view results for these tests on the individual orders.     Reading per radiology      Gregory Hahn PA-C, September 30, 2023

## 2023-10-04 ENCOUNTER — OFFICE VISIT (OUTPATIENT)
Dept: OPHTHALMOLOGY | Facility: CLINIC | Age: 68
End: 2023-10-04
Attending: STUDENT IN AN ORGANIZED HEALTH CARE EDUCATION/TRAINING PROGRAM
Payer: COMMERCIAL

## 2023-10-04 DIAGNOSIS — Z96.1 PSEUDOPHAKIA, BOTH EYES: ICD-10-CM

## 2023-10-04 DIAGNOSIS — H25.813 COMBINED FORMS OF AGE-RELATED CATARACT OF BOTH EYES: ICD-10-CM

## 2023-10-04 DIAGNOSIS — Z98.890 POSTOPERATIVE EYE STATE: Primary | ICD-10-CM

## 2023-10-04 DIAGNOSIS — H40.033 ANATOMICAL NARROW ANGLE OF BOTH EYES: ICD-10-CM

## 2023-10-04 PROCEDURE — 92020 GONIOSCOPY: CPT | Performed by: STUDENT IN AN ORGANIZED HEALTH CARE EDUCATION/TRAINING PROGRAM

## 2023-10-04 PROCEDURE — G0463 HOSPITAL OUTPT CLINIC VISIT: HCPCS | Performed by: STUDENT IN AN ORGANIZED HEALTH CARE EDUCATION/TRAINING PROGRAM

## 2023-10-04 PROCEDURE — 99024 POSTOP FOLLOW-UP VISIT: CPT | Mod: GC | Performed by: STUDENT IN AN ORGANIZED HEALTH CARE EDUCATION/TRAINING PROGRAM

## 2023-10-04 ASSESSMENT — REFRACTION_MANIFEST
OD_AXIS: 017
OS_ADD: +2.50
OS_SPHERE: PLANO
OD_ADD: +2.50
OD_SPHERE: -0.50
OD_CYLINDER: +1.25
OS_AXIS: 160
OS_CYLINDER: +1.25

## 2023-10-04 ASSESSMENT — VISUAL ACUITY
OS_SC: 20/40
OD_SC: 20/30-3
OS_SC+: +3
METHOD: SNELLEN - LINEAR
OD_SC+: +2

## 2023-10-04 ASSESSMENT — CONF VISUAL FIELD
OS_NORMAL: 1
OD_SUPERIOR_NASAL_RESTRICTION: 0
METHOD: COUNTING FINGERS
OS_SUPERIOR_NASAL_RESTRICTION: 0
OD_INFERIOR_NASAL_RESTRICTION: 0
OD_SUPERIOR_TEMPORAL_RESTRICTION: 0
OS_INFERIOR_TEMPORAL_RESTRICTION: 0
OS_INFERIOR_NASAL_RESTRICTION: 0
OD_INFERIOR_TEMPORAL_RESTRICTION: 0
OD_NORMAL: 1
OS_SUPERIOR_TEMPORAL_RESTRICTION: 0

## 2023-10-04 ASSESSMENT — EXTERNAL EXAM - LEFT EYE: OS_EXAM: NORMAL

## 2023-10-04 ASSESSMENT — CUP TO DISC RATIO: OD_RATIO: 0.45

## 2023-10-04 ASSESSMENT — TONOMETRY
OD_IOP_MMHG: 13
IOP_METHOD: TONOPEN
OS_IOP_MMHG: 11

## 2023-10-04 ASSESSMENT — SLIT LAMP EXAM - LIDS
COMMENTS: WNL
COMMENTS: WNL

## 2023-10-04 ASSESSMENT — EXTERNAL EXAM - RIGHT EYE: OD_EXAM: NORMAL

## 2023-10-04 NOTE — PROGRESS NOTES
HPI       Post Op (Ophthalmology) Right Eye    In right eye.  Associated symptoms include dryness.  Negative for eye pain, redness, tearing and itching.  Treatments tried include artificial tears.  Pain was noted as 0/10. Additional comments: 1 month post op of right eye CE IOL 09/05/2023.  S/p CE IOL left eye 08/24/2023             Comments    Eye meds: AT's each eye     No double vision.  Started an antibiotic for cough and had reactions, stopped azithromycin 2 days ago.  Prednisone 40 mg BID PO for cough.  No new flashes, floaters, or curtain down visual field.    FATIMAH Leger 10/4/2023 3:11 PM                    Last edited by Re Keita MD on 10/4/2023  4:13 PM.          Review of systems for the eyes was negative other than the pertinent positives/negatives listed in the HPI.    Ocular Meds: ATs prn OU    Ocular Hx: refractive error OU; history of anatomically narrow angles OU; CE/IOL left eye 8/24/23; CE/IOL right eye 9/5/23    FOHx: no family history of glaucoma or blindness    PMHx:     Past Medical History:   Diagnosis Date    Adenocarcinoma, lung (H)     Asthma     Ectopic pregnancy     Esophageal reflux     Pulmonary emphysema (H)     Very severe FEV1<30% predicted    Type II diabetes mellitus (H)        Assessment & Plan     Jenn Aparicio is a 68 year old female with the following diagnoses:    Postoperative eye state  Pseudophakia both eyes  - s/p uncomplicated phaco left eye 08/24/2023 25.0+ CC60WF  - s/p uncomplicated phaco right eye 09/05/2023 23.0+ CC60WF  - happy with results, no signs of infection  - No infection, no RT/RD right eye on DFE; left eye DFE already done 09/22/203  - Mild PCO right eye, not visually significant.  - off all drops  - Post-operative do's and don'ts reviewed, questions answered  - patient has emergency contact information  - reviewed importance of medication compliance with patient  - Counseled endophthalmitis/RD/return precautions    Hx of Anatomically Narrow  Angles OU  - IOP wnl, CVF full to CF OU  - no FHx of glaucoma  - previously on gonio undilated with 4-mirror has steep approach of iris with angles open to PTM/SS most areas, and opens on dynamic gonioscopy OU  - post cataract surgery (10/4/23) gonioscopy open to at least scleral spur 360 OU ?small PAS superior angle vs iris process OS    T2DM without Retinopathy OU  - strict BP/BG control  - patient understands importance of good blood glucose control in order to reduce the risk of developing diabetic retinopathy  - yearly DFE    Dry Eyes OU  - PFATs QID and prn OU  - can use artificial tear gel or ointment at bedtime OU  - consider punctal plugs in future (reviewed with patient)    Myopia of right eye with astigmatism and presbyopia  Astigmatism of left eye with presbyopia  - refraction reviewed and dispensed today    Counseled return/RD precautions    Patient disposition:   Return in about 6 months (around 4/4/2024) for Follow Up, VT, gonio, or sooner changes.    Brent Elias MD, MSc  Ophthalmology PGY-4 resident physician    Attending Physician Attestation:  Complete documentation of historical and exam elements from today's encounter can be found in the full encounter summary report (not reduplicated in this progress note).  I personally obtained the chief complaint(s) and history of present illness.  I confirmed and edited as necessary the review of systems, past medical/surgical history, family history, social history, and examination findings as documented by others; and I examined the patient myself.  I personally reviewed the relevant tests, images, and reports as documented above.  I formulated and edited as necessary the assessment and plan and discussed the findings and management plan with the patient and family. . - Re Keita MD

## 2023-10-04 NOTE — NURSING NOTE
Chief Complaints and History of Present Illnesses   Patient presents with    Post Op (Ophthalmology) Right Eye     1 month post op of right eye CE IOL 09/05/2023.  S/p CE IOL left eye 08/24/2023      Chief Complaint(s) and History of Present Illness(es)       Post Op (Ophthalmology) Right Eye              Laterality: right eye    Associated symptoms: dryness and headache.  Negative for eye pain, redness, tearing and itching    Treatments tried: artificial tears    Pain scale: 0/10    Comments: 1 month post op of right eye CE IOL 09/05/2023.  S/p CE IOL left eye 08/24/2023              Comments    Eye meds: AT's each eye     Whole eye twitches every day, both eyes.  Headaches everyday.  No double vision.  Started an antibiotic for cough and had reactions, stopped azithromycin 2 days ago.  Prednisone 40 mg BID PO for cough.    FATIMAH Leger 10/4/2023 3:11 PM

## 2023-10-05 ENCOUNTER — OFFICE VISIT (OUTPATIENT)
Dept: INTERNAL MEDICINE | Facility: CLINIC | Age: 68
End: 2023-10-05
Payer: COMMERCIAL

## 2023-10-05 VITALS
BODY MASS INDEX: 35.3 KG/M2 | DIASTOLIC BLOOD PRESSURE: 76 MMHG | SYSTOLIC BLOOD PRESSURE: 127 MMHG | HEIGHT: 64 IN | WEIGHT: 206.8 LBS | OXYGEN SATURATION: 95 % | HEART RATE: 82 BPM

## 2023-10-05 DIAGNOSIS — J44.1 CHRONIC OBSTRUCTIVE PULMONARY DISEASE WITH ACUTE EXACERBATION (H): ICD-10-CM

## 2023-10-05 DIAGNOSIS — E11.42 DIABETIC PERIPHERAL NEUROPATHY (H): ICD-10-CM

## 2023-10-05 DIAGNOSIS — E11.65 TYPE 2 DIABETES MELLITUS WITH HYPERGLYCEMIA, WITHOUT LONG-TERM CURRENT USE OF INSULIN (H): ICD-10-CM

## 2023-10-05 DIAGNOSIS — H04.123 DRY EYES: ICD-10-CM

## 2023-10-05 DIAGNOSIS — Z12.12 SCREENING FOR COLORECTAL CANCER: ICD-10-CM

## 2023-10-05 DIAGNOSIS — Z23 NEED FOR COVID-19 VACCINE: ICD-10-CM

## 2023-10-05 DIAGNOSIS — Z12.11 SCREENING FOR COLORECTAL CANCER: ICD-10-CM

## 2023-10-05 DIAGNOSIS — J44.1 COPD EXACERBATION (H): ICD-10-CM

## 2023-10-05 DIAGNOSIS — Z12.31 ENCOUNTER FOR SCREENING MAMMOGRAM FOR BREAST CANCER: Primary | ICD-10-CM

## 2023-10-05 DIAGNOSIS — Z23 FLU VACCINE NEED: ICD-10-CM

## 2023-10-05 PROCEDURE — G0009 ADMIN PNEUMOCOCCAL VACCINE: HCPCS | Performed by: NURSE PRACTITIONER

## 2023-10-05 PROCEDURE — 90472 IMMUNIZATION ADMIN EACH ADD: CPT | Performed by: NURSE PRACTITIONER

## 2023-10-05 PROCEDURE — 99397 PER PM REEVAL EST PAT 65+ YR: CPT | Mod: 25 | Performed by: NURSE PRACTITIONER

## 2023-10-05 PROCEDURE — 90662 IIV NO PRSV INCREASED AG IM: CPT | Performed by: NURSE PRACTITIONER

## 2023-10-05 PROCEDURE — G0008 ADMIN INFLUENZA VIRUS VAC: HCPCS | Performed by: NURSE PRACTITIONER

## 2023-10-05 PROCEDURE — 90677 PCV20 VACCINE IM: CPT | Performed by: NURSE PRACTITIONER

## 2023-10-05 PROCEDURE — 90480 ADMN SARSCOV2 VAC 1/ONLY CMP: CPT | Performed by: NURSE PRACTITIONER

## 2023-10-05 RX ORDER — DOXYCYCLINE HYCLATE 100 MG
100 TABLET ORAL 2 TIMES DAILY
Qty: 20 TABLET | Refills: 0 | Status: SHIPPED | OUTPATIENT
Start: 2023-10-05 | End: 2023-10-12

## 2023-10-05 RX ORDER — PREGABALIN 150 MG/1
CAPSULE ORAL
Qty: 270 CAPSULE | Refills: 1 | Status: SHIPPED | OUTPATIENT
Start: 2023-10-05 | End: 2024-05-03

## 2023-10-05 RX ORDER — PREDNISONE 20 MG/1
40 TABLET ORAL DAILY
Qty: 10 TABLET | Refills: 0 | Status: SHIPPED | OUTPATIENT
Start: 2023-10-05 | End: 2023-10-12

## 2023-10-05 NOTE — NURSING NOTE
"Jenn Aparicio is a 68 year old female patient that presents today in clinic for the following:    Chief Complaint   Patient presents with    Physical     Medication review. Cancel Azithromycin. Change eyedrop.  Medication refill.  Immunization.     The patient's allergies and medications were reviewed as noted. A set of vitals were recorded as noted without incident: /76 (BP Location: Right arm, Patient Position: Sitting, Cuff Size: Adult Regular)   Pulse 82   Ht 1.626 m (5' 4\")   Wt 93.8 kg (206 lb 12.8 oz)   SpO2 95%   BMI 35.50 kg/m  . The patient does not have any other questions for the provider.    Aleta Leal, EMT at 12:34 PM on 10/5/2023.  Primary care clinic: [phone]    "

## 2023-10-05 NOTE — PROGRESS NOTES
S:     Jenn Aparicio is a 68 year old female with significant past medical history pertinent for COPD, recent diagnosis of exacerbation dx 9/30/23 and started on antibiotics (which she stopped due to side effects of dizziness), adenocarcinoma of the lung, acute on chronic hypoxic respiratory failure, DM type II complicated by diabetic neuropathy, who is presenting for a annual physical.    She was given eye drops for dry eyes which she states are not re;ieving symptoms and would like to try something else.        Patient Active Problem List   Diagnosis     Vaginitis     Postmenopausal bleeding     Cervical stenosis (uterine cervix)     Pulmonary emphysema (H)     Type 2 diabetes mellitus without complication, without long-term current use of insulin (H)     Primary lung adenocarcinoma (H) STAGE: IA2 [id number] poorly diff adeno RLL, then [id number] IA2 suspected NSCLC RUL, medically inoperable      Chronic obstructive pulmonary disease, unspecified COPD type (H)     Malnutrition (H24)     Diabetic neuropathy (H)     Hypoxia     Shortness of breath     Chest pain, unspecified type     Hypokalemia     Gastroesophageal reflux disease without esophagitis     Esophageal dysphagia     Acute and chronic respiratory failure with hypoxia (H)     Combined forms of age-related cataract of both eyes            Past Medical History:   Diagnosis Date     Adenocarcinoma, lung (H)      Asthma      Ectopic pregnancy      Esophageal reflux      Pulmonary emphysema (H)     Very severe FEV1<30% predicted     Type II diabetes mellitus (H)             Past Surgical History:   Procedure Laterality Date     2/8/16                R thoracotomy, RLL lobectomy (Dr. Cunha). Adenocarcinoma, 1.1 cm, assoicated with atypical adenomatous hyperplasia Right 02/08/2016    R thoracotomy, RLL lobectomy (Dr. Cunha). Adenocarcinoma, 1.1 cm, assoicated with atypical adenomatous hyperplasia     BRONCHOSCOPY, WITH BIOPSY, ROBOT ASSISTED N/A 7/19/2023     Procedure: robot assisted Ion BRONCHOSCOPY, WITH BIOPSY;  Surgeon: Patria Garcia MD;  Location:  OR     ENDOBRONCHIAL ULTRASOUND FLEXIBLE N/A 7/19/2023    Procedure: Endobronchial ultrasound flexible;  Surgeon: Patria Garcia MD;  Location:  OR     ESOPHAGOSCOPY, GASTROSCOPY, DUODENOSCOPY (EGD), COMBINED N/A 8/16/2022    Procedure: ESOPHAGOGASTRODUODENOSCOPY (EGD);  Surgeon: Travis Briseno MD;  Location:  GI     ESOPHAGOSCOPY, GASTROSCOPY, DUODENOSCOPY (EGD), COMBINED N/A 8/8/2023    Procedure: ESOPHAGOGASTRODUODENOSCOPY, WITH BIOPSY;  Surgeon: Chao Rodrigues DO;  Location:  GI     ORTHOPEDIC SURGERY       PHACOEMULSIFICATION CLEAR CORNEA WITH STANDARD INTRAOCULAR LENS IMPLANT Left 8/24/2023    Procedure: LEFT EYE PHACOEMULSIFICATION, CATARACT, WITH INTRAOCULAR LENS IMPLANT;  Surgeon: Re Keita MD;  [place] [facility] [place]     PHACOEMULSIFICATION CLEAR CORNEA WITH STANDARD INTRAOCULAR LENS IMPLANT Right 9/5/2023    Procedure: RIGHT EYE PHACOEMULSIFICATION, CATARACT, WITH INTRAOCULAR LENS IMPLANT;  Surgeon: Re Keita MD;  [place] [facility] [place]          Social History     Tobacco Use     Smoking status: Former     Packs/day: 0.25     Types: Cigarettes     Smokeless tobacco: Never     Tobacco comments:     01/02/2023 Patient using 14 mg patch, wants to have prescription for Nicotrol inhaler, took workbook   Substance Use Topics     Alcohol use: Not Currently            Family History   Problem Relation Age of Onset     Thyroid Disease Mother      Cerebrovascular Disease Mother      Hypertension Mother      Hypertension Father      Glaucoma Father      Cancer Sister      Lung Cancer Sister      Diabetes Brother      Cancer Brother      Diabetes Brother      Cancer Brother      Diabetes Brother      Deep Vein Thrombosis (DVT) Daughter      Depression Daughter      Alcohol/Drug Other         self     Diabetes Other         self     Thyroid Disease Other         self     Asthma Other         self     Macular  Degeneration No family hx of      Anesthesia Reaction No family hx of                Allergies   Allergen Reactions     Bee Venom Anaphylaxis     Interferons Dermatitis     Penicillins Hives     Aspirin      325mg      Colon Care             Current Outpatient Medications   Medication Sig Dispense Refill     acetylcysteine (MUCOMYST) 10 % nebulizer solution Inhale 4 mLs into the lungs 4 times daily 480 mL 0     albuterol (PROAIR HFA/PROVENTIL HFA/VENTOLIN HFA) 108 (90 Base) MCG/ACT inhaler USE 1 OR 2 INHALATIONS EVERY 4 HOURS AS NEEDED (Patient taking differently: Inhale 1 puff into the lungs daily USE 1 OR 2 INHALATIONS EVERY 4 HOURS AS NEEDED) 17 g 3     albuterol (PROVENTIL) (2.5 MG/3ML) 0.083% neb solution Take 1 vial (2.5 mg) by nebulization 4 times daily 360 mL 0     Alpha Lipoic Acid 200 MG CAPS Take 1 capsule by mouth daily (Patient taking differently: Take 1 capsule by mouth every morning) 90 capsule 3     amLODIPine (NORVASC) 10 MG tablet Take 1 tablet (10 mg) by mouth daily 90 tablet 3     ASPIRIN LOW DOSE 81 MG EC tablet TAKE 1 TABLET DAILY (Patient taking differently: Take 81 mg by mouth every morning) 90 tablet 2     azithromycin (ZITHROMAX) 250 MG tablet Take 2 tablets (500 mg) by mouth daily for 1 day, THEN 1 tablet (250 mg) daily for 4 days. 6 tablet 0     buPROPion (WELLBUTRIN XL) 300 MG 24 hr tablet Take 1 tablet (300 mg) by mouth every morning 90 tablet 3     cetirizine (ZYRTEC) 10 MG tablet Take 1 tablet (10 mg) by mouth every morning 30 tablet 11     Continuous Blood Gluc Sensor (FREESTYLE GIOVANNI 2 SENSOR) [facility] 1 each every 14 days For use with Freestyle Giovanni 2  for continuous monitioring of blood glucose levels. Replace sensor every 14 days. 2 each 11     cyanocobalamin (VITAMIN B-12) 500 MCG tablet Take 1 tablet (500 mcg) by mouth daily (Patient taking differently: Take 500 mcg by mouth every morning) 90 tablet 1     doxycycline hyclate (VIBRA-TABS) 100 MG tablet Take 1 tablet (100  mg) by mouth 2 times daily (Patient taking differently: Take 100 mg by mouth every morning) 10 tablet 1     empagliflozin (JARDIANCE) 25 MG TABS tablet Take 1 tablet (25 mg) by mouth daily 90 tablet 0     EPINEPHrine (ANY BX GENERIC EQUIV) 0.3 MG/0.3ML injection 2-pack Inject 0.3 mLs (0.3 mg) into the muscle as needed for anaphylaxis (related to bee stings) May repeat one time in 5-15 minutes if response to initial dose is inadequate. 2 each 1     fluticasone (FLONASE) 50 MCG/ACT nasal spray Spray 1 spray into both nostrils 2 times daily Use at night before bed 15.8 mL 3     GLUCOSAMINE-CHONDROITIN -400 MG tablet TAKE 1 TABLET DAILY (Patient taking differently: Take 1 tablet by mouth every evening) 90 tablet 3     insulin pen needle (BD MARCIO U/F) 32G X 4 MM miscellaneous Use 1 pen needle daily or as directed. 100 each 1     ipratropium - albuterol 0.5 mg/2.5 mg/3 mL (DUONEB) 0.5-2.5 (3) MG/3ML neb solution Take 1 vial (3 mLs) by nebulization every 6 hours as needed for shortness of breath, wheezing or cough 90 mL 1     ketorolac (ACULAR) 0.5 % ophthalmic solution Place 1 drop into the right eye 4 times daily 5 mL 0     ketotifen (ZADITOR) 0.025 % ophthalmic solution Place 1 drop into both eyes 2 times daily       moxifloxacin (VIGAMOX) 0.5 % ophthalmic solution Place 1 drop into the right eye 4 times daily 3 mL 0     multivitamin w/minerals (THERA-VIT-M) tablet Take 1 tablet by mouth daily (Patient taking differently: Take 1 tablet by mouth every morning) 30 tablet 11     nicotine (NICODERM CQ) 14 MG/24HR 24 hr patch Place 1 patch onto the skin every 24 hours       nicotine (NICORETTE) 4 MG lozenge Place 1 lozenge (4 mg) inside cheek every hour as needed for smoking cessation 100 lozenge 0     nicotine (NICOTROL) 10 MG inhaler Use 1 cartridge as needed for urge to smoke by puffing over course of 20min.  Use 6-16 cart/day; reduce number of cart/day over 6-12 weeks. 160 each 0     olopatadine (PATADAY) 0.2 %  "ophthalmic solution Place 0.05 mLs (1 drop) into both eyes daily 2.5 mL 11     omega-3 acid ethyl esters (LOVAZA) 1 g capsule Take 2 capsules (2 g) by mouth 2 times daily (Patient taking differently: Take 1 g by mouth 2 times daily Take 2 capsules (2 g) by mouth 2 times daily) 360 capsule 3     omeprazole (PRILOSEC) 20 MG DR capsule TAKE 1 CAPSULE TWICE A  capsule 3     omeprazole (PRILOSEC) 40 MG DR capsule Take 1 capsule (40 mg) by mouth 2 times daily for 180 days 180 capsule 1     polyethylene glycol-propylene glycol (SYSTANE) 0.4-0.3 % SOLN ophthalmic solution Place 1 drop into both eyes 4 times daily 5 mL 11     polyethylene glycol-propylene glycol (SYSTANE) 0.4-0.3 % SOLN ophthalmic solution Place 1 drop into both eyes 4 times daily 5 mL 11     prednisoLONE acetate (PRED FORTE) 1 % ophthalmic suspension Place 1 drop into the right eye 4 times daily 5 mL 0     predniSONE (DELTASONE) 20 MG tablet Take 2 tablets (40 mg) by mouth daily for 5 days 10 tablet 0     predniSONE (DELTASONE) 20 MG tablet Take 2 tablets (40 mg) by mouth daily 10 tablet 3     pregabalin (LYRICA) 150 MG capsule TAKE 1 CAPSULE(150 MG) BY MOUTH TWICE DAILY 90 capsule 1     roflumilast (DALIRESP) 250 MCG TABS tablet Take 1 tablet (250 mcg) by mouth daily for 28 days, THEN 2 tablets (500 mcg) daily for 62 days. 152 tablet 0     semaglutide (OZEMPIC, 0.25 OR 0.5 MG/DOSE,) 2 MG/3ML pen Inject 0.5 mg Subcutaneous once a week 3 mL 11     sodium chloride (AMBROSE 128) 5 % ophthalmic solution Place 1-2 drops Into the left eye 4 times daily 15 mL 0     STATIN NOT PRESCRIBED (INTENTIONAL) Please choose reason not prescribed from choices below.       TRELEGY ELLIPTA 200-62.5-25 MCG/ACT oral inhaler USE 1 INHALATION DAILY 28 each 5       REVIEW OF SYSTEMS:  See above.    O:   /76 (BP Location: Right arm, Patient Position: Sitting, Cuff Size: Adult Regular)   Pulse 82   Ht 1.626 m (5' 4\")   Wt 93.8 kg (206 lb 12.8 oz)   SpO2 95%   BMI 35.50 " kg/m      GENERAL APPEARANCE: alert and no distress  EYES:  sclera white, conjunctiva normal.  HENT: ear canals and TM's normal and mouth without ulcers or lesions  RESP: positive for wheezing all lobes. No rhonchi.   CV: regular rates and rhythm, normal S1 S2, no S3 or S4 and no murmur, click or rub   ABDOMEN:  soft, nontender, no HSM or masses and bowel sounds normal  NEURO: alert and oriented.  Good historian.  MUSK: ambulatory.  SKIN: normal.  LYMPH: neg axillary, cervical, supra and infraclavicular nodes.  EXT: warm.  Edema : none.  PSYCHE: alert and normal.    The 10-year ASCVD risk score (Chun TENORIO, et al., 2019) is: 25.5%    Values used to calculate the score:      Age: 68 years      Sex: Female      Is Non- : Yes      Diabetic: Yes      Tobacco smoker: No      Systolic Blood Pressure: 120 mmHg      Is BP treated: Yes      HDL Cholesterol: 58 mg/dL      Total Cholesterol: 251 mg/dL    A/P:    Jenn was seen today for physical.    Diagnoses and all orders for this visit:    Encounter for screening mammogram for breast cancer  -     MA Screening Digital Bilateral; Future    Screening for colorectal cancer  -     Colonoscopy Screening  Referral; Future    Dry eyes  -     carboxymethylcellulose (REFRESH LIQUIGEL) 1 % ophthalmic solution; Apply 1 drop to eye 4 times daily    COPD exacerbation (H)  -     doxycycline hyclate (VIBRA-TABS) 100 MG tablet; Take 1 tablet (100 mg) by mouth 2 times daily  -     predniSONE (DELTASONE) 20 MG tablet; Take 2 tablets (40 mg) by mouth daily    Type 2 diabetes mellitus with hyperglycemia, without long-term current use of insulin (H)  -     Continue Jardiance.         -     Continue Ozempic    Diabetic peripheral neuropathy (H)  -     pregabalin (LYRICA) 150 MG capsule; TAKE 1 CAPSULE(150 MG) BY MOUTH TWICE DAILY    Flu vaccine need  -     INFLUENZA VACCINE 65+ (FLUZONE HD)    Need for COVID-19 vaccine  -     COVID-19 12+ (2023-24)  (PFIZER)    Other orders  -     PNEUMOCOCCAL 20 VALENT CONJUGATE (PREVNAR 20)    The patient voiced understanding of the information discussed and all questions were answered.     I spent a total of 40 minutes in the care of this pt during today's office visit. This time includes reviewing the patient's chart and prior history, obtaining a history, performing an examination and evaluation and counseling the patient. This time also includes ordering medications or tests necessary in addition to communication to other member's of the patient's health care team. Time spent in documentation and care coordination is included.     Mitzi LABOY, CNP

## 2023-10-12 ENCOUNTER — VIRTUAL VISIT (OUTPATIENT)
Dept: PHARMACY | Facility: CLINIC | Age: 68
End: 2023-10-12
Payer: COMMERCIAL

## 2023-10-12 DIAGNOSIS — E11.9 TYPE 2 DIABETES MELLITUS WITHOUT COMPLICATION, WITHOUT LONG-TERM CURRENT USE OF INSULIN (H): ICD-10-CM

## 2023-10-12 DIAGNOSIS — Z72.0 TOBACCO ABUSE: Primary | ICD-10-CM

## 2023-10-12 DIAGNOSIS — E78.5 HYPERLIPIDEMIA, UNSPECIFIED HYPERLIPIDEMIA TYPE: ICD-10-CM

## 2023-10-12 DIAGNOSIS — E11.42 DIABETIC POLYNEUROPATHY ASSOCIATED WITH TYPE 2 DIABETES MELLITUS (H): ICD-10-CM

## 2023-10-12 PROCEDURE — 99607 MTMS BY PHARM ADDL 15 MIN: CPT | Mod: 93 | Performed by: PHARMACIST

## 2023-10-12 PROCEDURE — 99606 MTMS BY PHARM EST 15 MIN: CPT | Mod: 93 | Performed by: PHARMACIST

## 2023-10-12 NOTE — PROGRESS NOTES
Medication Therapy Management (MTM) Encounter    ASSESSMENT:                            Medication Adherence/Access: No issues identified    Diabetes:   Patient is not meeting A1c goal of < 7%, however there has been large improvements. Was unable to assess blood glucose today due to her not using CGM consistently lately. Can discharge aspirin as it is for primary prevention and likely causing her bothersome bruising.     Tobacco Abuse:   Discussed tapering off of nicotine patch but she is not ready for this. Discussed that I will send prescription and she can quintana when she is ready.     Hyperlipidemia:   Patient would benefit from redraw of cholesterol as it has been almost 2 years. If still elevated patient is open to starting a statin.     Pain:  She is potentially getting drowsy from her Lyrica. She should trial a once a day dose to see if her fatigue improves without too much increase in pain.       PLAN:                            Discontinue aspirin  Try taking Lyrica once daily for a couple days to see if your pain is okay   Get cholesterol level drawn.    Follow-up: Return in about 13 weeks (around 1/11/2024) for Follow up, with me.    SUBJECTIVE/OBJECTIVE:                          Jenn Aparicio is a 68 year old female called for a follow-up visit from 7/13.       Reason for visit: diabetes follow-up.    Allergies/ADRs: Reviewed in chart  Past Medical History: Reviewed in chart  Tobacco: She reports that she has quit smoking. Her smoking use included cigarettes. She smoked an average of .25 packs per day. She has never used smokeless tobacco.  Alcohol: none    Medication Adherence/Access: no issues reported    Diabetes   Jardiance 25 mg daily  Ozempic 0.5 mg weekly - reports she feels off for a few days after she takes it.  Aspirin 81 mg daily for primary prevention. - reports she is having more and more bruises.     Patient is not experiencing side effects.  Blood sugar monitoring: CGM. Ranges (patient  reported):   Had been without for a little while so readings are not accurate.   Symptoms of low blood sugar? reports sometimes she gets shaky  Symptoms of high blood sugar? none  Eye exam: due  Foot exam: due    Statin: No   ACEi/ARB: No.      Eye exam is up to date  Foot exam is up to date  Urine Albumin:   Lab Results   Component Value Date    UMALCR 271.48 (H) 08/23/2023      Lab Results   Component Value Date    A1C 7.5 (H) 08/23/2023         Smoking Cessation: Current therapy include Wellbutrin 300 mg daily, nicotine patch 14 mg daily (doesn't want to go down to the 7 mg patch due to current stress.), and nicotine lozenges 4 mg as needed - reports they taste awful.  Has not been smoking the last 3 months. Would be open to decreasing to 7 mg patch in about a month.      Hyperlipidemia:Would like to get another cholesterol level before discussing a statin further.      Recent Labs   Lab Test 01/27/22  1439 01/11/22  0845   CHOL 251* 224*   HDL 58 55   * 149*   TRIG 127 102     Pain:  Alpha lipoic acid daily  Lyrica 150 mg twice a day    Reports when she uses both she feels better. Ran out of alpha lipoic acid and had a lot of pain.     Reports she is feeling a lot of fatigue.     Today's Vitals: There were no vitals taken for this visit.  ----------------      I spent 23 minutes with this patient today. All changes were made via collaborative practice agreement with BENOIT Ruiz CNP. A copy of the visit note was provided to the patient's provider(s).    A summary of these recommendations was mailed to the patient.    Samy Prasad, Pharm. D., [facility]  Medication Therapy Management Pharmacist      Telemedicine Visit Details  Type of service:  Telephone visit  Start Time:  9:03 am  End Time:  9:26 am       Medication Therapy Recommendations  Diabetic neuropathy (H)    Current Medication: pregabalin (LYRICA) 150 MG capsule   Rationale: Undesirable effect - Adverse medication event - Safety    Recommendation: Decrease Frequency   Status: Accepted - no CPA Needed         Type 2 diabetes mellitus without complication, without long-term current use of insulin (H)    Current Medication: ASPIRIN LOW DOSE 81 MG EC tablet (Discontinued)   Rationale: Undesirable effect - Adverse medication event - Safety   Recommendation: Discontinue Medication   Status: Accepted per CPA

## 2023-10-12 NOTE — PATIENT INSTRUCTIONS
"Recommendations from today's MTM visit:                                                    MTM (medication therapy management) is a service provided by a clinical pharmacist designed to help you get the most of out of your medicines.   Today we reviewed what your medicines are for, how to know if they are working, that your medicines are safe and how to make your medicine regimen as easy as possible.    Discontinue aspirin  Try taking Lyrica once daily for a couple days to see if your pain is okay   Get cholesterol level drawn.    Follow-up: Return in about 13 weeks (around 1/11/2024) for Follow up, with me.    It was great speaking with you today.  I value your experience and would be very thankful for your time in providing feedback in our clinic survey. In the next few days, you may receive an email or text message from [employer] with a link to a survey related to your  clinical pharmacist.\"     To schedule another MTM appointment, please call the clinic directly or you may call the MTM scheduling line at [phone] or toll-free at [phone].     My Clinical Pharmacist's contact information:                                                      Please feel free to contact me with any questions or concerns you have.      Samy Prasad, Pharm. D., BCACP  Medication Therapy Management Pharmacist    "

## 2023-10-16 ENCOUNTER — DOCUMENTATION ONLY (OUTPATIENT)
Dept: INTERNAL MEDICINE | Facility: CLINIC | Age: 68
End: 2023-10-16
Payer: COMMERCIAL

## 2023-10-16 ENCOUNTER — APPOINTMENT (OUTPATIENT)
Dept: OPTOMETRY | Facility: CLINIC | Age: 68
End: 2023-10-16
Payer: COMMERCIAL

## 2023-10-16 DIAGNOSIS — J44.1 CHRONIC OBSTRUCTIVE PULMONARY DISEASE WITH ACUTE EXACERBATION (H): Primary | ICD-10-CM

## 2023-10-16 PROCEDURE — 92341 FIT SPECTACLES BIFOCAL: CPT | Performed by: OPTOMETRIST

## 2023-10-18 ENCOUNTER — TELEPHONE (OUTPATIENT)
Dept: ENDOCRINOLOGY | Facility: CLINIC | Age: 68
End: 2023-10-18

## 2023-10-18 NOTE — TELEPHONE ENCOUNTER
Spoke w/ Pt: confirms understanding no lab draw needed. CCs notified to cancel lab visit per pt request.   Luanne Yanez RN on 10/18/2023 at 11:40 AM         RE    La Nena Shabazz MD 19 hours ago (4:14 PM)     NT  No labs needed: The patient had A1c and other labs drawn in August (perhaps earlier than we planned).

## 2023-10-26 ENCOUNTER — ANCILLARY PROCEDURE (OUTPATIENT)
Dept: CT IMAGING | Facility: CLINIC | Age: 68
End: 2023-10-26
Attending: INTERNAL MEDICINE
Payer: COMMERCIAL

## 2023-10-26 ENCOUNTER — ONCOLOGY VISIT (OUTPATIENT)
Dept: ONCOLOGY | Facility: CLINIC | Age: 68
End: 2023-10-26
Attending: INTERNAL MEDICINE
Payer: COMMERCIAL

## 2023-10-26 ENCOUNTER — LAB (OUTPATIENT)
Dept: LAB | Facility: CLINIC | Age: 68
End: 2023-10-26
Payer: COMMERCIAL

## 2023-10-26 VITALS
HEART RATE: 84 BPM | TEMPERATURE: 98.5 F | DIASTOLIC BLOOD PRESSURE: 69 MMHG | BODY MASS INDEX: 35.98 KG/M2 | SYSTOLIC BLOOD PRESSURE: 152 MMHG | OXYGEN SATURATION: 100 % | RESPIRATION RATE: 20 BRPM | WEIGHT: 209.6 LBS

## 2023-10-26 DIAGNOSIS — C34.31 MALIGNANT NEOPLASM OF LOWER LOBE OF RIGHT LUNG (H): ICD-10-CM

## 2023-10-26 DIAGNOSIS — J44.9 CHRONIC OBSTRUCTIVE PULMONARY DISEASE, UNSPECIFIED COPD TYPE (H): ICD-10-CM

## 2023-10-26 DIAGNOSIS — J44.1 COPD EXACERBATION (H): Primary | ICD-10-CM

## 2023-10-26 DIAGNOSIS — M54.31 RIGHT SCIATIC NERVE PAIN: ICD-10-CM

## 2023-10-26 DIAGNOSIS — E78.5 HYPERLIPIDEMIA, UNSPECIFIED HYPERLIPIDEMIA TYPE: ICD-10-CM

## 2023-10-26 DIAGNOSIS — R53.81 PHYSICAL DECONDITIONING: ICD-10-CM

## 2023-10-26 DIAGNOSIS — J30.2 SEASONAL ALLERGIC RHINITIS, UNSPECIFIED TRIGGER: ICD-10-CM

## 2023-10-26 LAB
ALBUMIN SERPL BCG-MCNC: 4 G/DL (ref 3.5–5.2)
ALP SERPL-CCNC: 104 U/L (ref 35–104)
ALT SERPL W P-5'-P-CCNC: 10 U/L (ref 0–50)
ANION GAP SERPL CALCULATED.3IONS-SCNC: 8 MMOL/L (ref 7–15)
AST SERPL W P-5'-P-CCNC: 18 U/L (ref 0–45)
BASOPHILS # BLD AUTO: 0.1 10E3/UL (ref 0–0.2)
BASOPHILS NFR BLD AUTO: 1 %
BILIRUB SERPL-MCNC: 0.3 MG/DL
BUN SERPL-MCNC: 5.1 MG/DL (ref 8–23)
CALCIUM SERPL-MCNC: 9 MG/DL (ref 8.8–10.2)
CHLORIDE SERPL-SCNC: 104 MMOL/L (ref 98–107)
CHOLEST SERPL-MCNC: 220 MG/DL
CREAT BLD-MCNC: 0.6 MG/DL (ref 0.5–1)
CREAT SERPL-MCNC: 0.56 MG/DL (ref 0.51–0.95)
DEPRECATED HCO3 PLAS-SCNC: 31 MMOL/L (ref 22–29)
EGFRCR SERPLBLD CKD-EPI 2021: >60 ML/MIN/1.73M2
EGFRCR SERPLBLD CKD-EPI 2021: >90 ML/MIN/1.73M2
EOSINOPHIL # BLD AUTO: 0.7 10E3/UL (ref 0–0.7)
EOSINOPHIL NFR BLD AUTO: 10 %
ERYTHROCYTE [DISTWIDTH] IN BLOOD BY AUTOMATED COUNT: 13.5 % (ref 10–15)
GLUCOSE SERPL-MCNC: 117 MG/DL (ref 70–99)
HCT VFR BLD AUTO: 43.7 % (ref 35–47)
HDLC SERPL-MCNC: 55 MG/DL
HGB BLD-MCNC: 13.4 G/DL (ref 11.7–15.7)
IMM GRANULOCYTES # BLD: 0 10E3/UL
IMM GRANULOCYTES NFR BLD: 0 %
LDLC SERPL CALC-MCNC: 146 MG/DL
LYMPHOCYTES # BLD AUTO: 1.6 10E3/UL (ref 0.8–5.3)
LYMPHOCYTES NFR BLD AUTO: 26 %
MCH RBC QN AUTO: 27.2 PG (ref 26.5–33)
MCHC RBC AUTO-ENTMCNC: 30.7 G/DL (ref 31.5–36.5)
MCV RBC AUTO: 89 FL (ref 78–100)
MONOCYTES # BLD AUTO: 0.5 10E3/UL (ref 0–1.3)
MONOCYTES NFR BLD AUTO: 9 %
NEUTROPHILS # BLD AUTO: 3.5 10E3/UL (ref 1.6–8.3)
NEUTROPHILS NFR BLD AUTO: 54 %
NONHDLC SERPL-MCNC: 165 MG/DL
NRBC # BLD AUTO: 0 10E3/UL
NRBC BLD AUTO-RTO: 0 /100
PLATELET # BLD AUTO: 174 10E3/UL (ref 150–450)
POTASSIUM SERPL-SCNC: 3.8 MMOL/L (ref 3.4–5.3)
PROT SERPL-MCNC: 6.8 G/DL (ref 6.4–8.3)
RBC # BLD AUTO: 4.92 10E6/UL (ref 3.8–5.2)
SODIUM SERPL-SCNC: 143 MMOL/L (ref 135–145)
TRIGL SERPL-MCNC: 96 MG/DL
WBC # BLD AUTO: 6.4 10E3/UL (ref 4–11)

## 2023-10-26 PROCEDURE — 74160 CT ABDOMEN W/CONTRAST: CPT | Mod: GC | Performed by: STUDENT IN AN ORGANIZED HEALTH CARE EDUCATION/TRAINING PROGRAM

## 2023-10-26 PROCEDURE — 84443 ASSAY THYROID STIM HORMONE: CPT | Performed by: INTERNAL MEDICINE

## 2023-10-26 PROCEDURE — 71260 CT THORAX DX C+: CPT | Mod: GC | Performed by: STUDENT IN AN ORGANIZED HEALTH CARE EDUCATION/TRAINING PROGRAM

## 2023-10-26 PROCEDURE — 80053 COMPREHEN METABOLIC PANEL: CPT | Performed by: PATHOLOGY

## 2023-10-26 PROCEDURE — 82565 ASSAY OF CREATININE: CPT | Mod: 91 | Performed by: PATHOLOGY

## 2023-10-26 PROCEDURE — G0463 HOSPITAL OUTPT CLINIC VISIT: HCPCS | Performed by: INTERNAL MEDICINE

## 2023-10-26 PROCEDURE — 99214 OFFICE O/P EST MOD 30 MIN: CPT | Performed by: INTERNAL MEDICINE

## 2023-10-26 PROCEDURE — 85025 COMPLETE CBC W/AUTO DIFF WBC: CPT | Performed by: PATHOLOGY

## 2023-10-26 PROCEDURE — 36415 COLL VENOUS BLD VENIPUNCTURE: CPT | Performed by: PATHOLOGY

## 2023-10-26 PROCEDURE — 80061 LIPID PANEL: CPT | Performed by: PATHOLOGY

## 2023-10-26 RX ORDER — FLUTICASONE FUROATE, UMECLIDINIUM BROMIDE AND VILANTEROL TRIFENATATE 200; 62.5; 25 UG/1; UG/1; UG/1
POWDER RESPIRATORY (INHALATION)
Qty: 28 EACH | Refills: 5 | Status: SHIPPED | OUTPATIENT
Start: 2023-10-26 | End: 2024-01-26

## 2023-10-26 RX ORDER — IOPAMIDOL 755 MG/ML
101 INJECTION, SOLUTION INTRAVASCULAR ONCE
Status: COMPLETED | OUTPATIENT
Start: 2023-10-26 | End: 2023-10-26

## 2023-10-26 RX ORDER — CETIRIZINE HYDROCHLORIDE 10 MG/1
10 TABLET ORAL EVERY MORNING
Qty: 30 TABLET | Refills: 11 | Status: SHIPPED | OUTPATIENT
Start: 2023-10-26 | End: 2024-01-26

## 2023-10-26 RX ADMIN — IOPAMIDOL 101 ML: 755 INJECTION, SOLUTION INTRAVASCULAR at 09:30

## 2023-10-26 ASSESSMENT — PAIN SCALES - GENERAL: PAINLEVEL: EXTREME PAIN (9)

## 2023-10-26 NOTE — NURSING NOTE
"Oncology Rooming Note    October 26, 2023 11:27 AM   Jenn Aparicio is a 68 year old female who presents for:    Chief Complaint   Patient presents with    Oncology Clinic Visit     Malignant neoplasm of lower lobe of right lung     Initial Vitals: BP (!) 152/69 (BP Location: Right arm, Patient Position: Sitting, Cuff Size: Adult Regular)   Pulse 84   Temp 98.5  F (36.9  C) (Oral)   Resp 20   Wt 95.1 kg (209 lb 9.6 oz)   SpO2 100%   BMI 35.98 kg/m   Estimated body mass index is 35.98 kg/m  as calculated from the following:    Height as of 10/5/23: 1.626 m (5' 4\").    Weight as of this encounter: 95.1 kg (209 lb 9.6 oz). Body surface area is 2.07 meters squared.  Extreme Pain (9) Comment: Data Unavailable   No LMP recorded. Patient is postmenopausal.  Allergies reviewed: Yes  Medications reviewed: Yes    Medications: MEDICATION REFILLS NEEDED TODAY. Provider was notified.  Pharmacy name entered into EPIC:    [facility] - [place], [place] - [address] [id number] - [place], [place] - [address] AT [facility] OF  [address]  [facility] - [facility], [place] - [facility]    Clinical concerns: Refills needed: Trellegy Inhaler, Cetirizine      Jackie Kaba, EMT  10/26/2023              "

## 2023-10-26 NOTE — LETTER
10/26/2023         RE: Jenn Apariico  [address]  [place] [address]        Dear Colleague,    Thank you for referring your patient, Jenn Aparicio, to the [facility]. Please see a copy of my visit note below.       [facility]    PATIENT NAME: Jenn Aparicio  MRN # [id number]   DATE OF VISIT: October 26, 2023  YOB: 1955     CANCER TYPE: Lung adenocarcinoma  STAGE: IA2 [id number] poorly diff adeno RLL, then [id number] IA2 suspected NSCLC RUL, medically inoperable     ECOG PS: 2    PD-L1: N/A  Lung panel: N/A  NGS: N/A    SUMMARY  12/24/15 PET/CT  1/13/15 CT lung bx. Adenocarcinoma  2/8/16 R thoracotomy, RLL lobectomy (Dr. Cunha). Adenocarcinoma, 1.1 cm, assoicated with atypical adenomatous hyperplasia  10/18/19 CTA for shortness of breath. New 1.3 cm RUL nodule  11/2019 PET/CT. 1.1 cm RUL hypermetabolic nodule (SUV 12.6)  12/26/19 Brain MRI negative for mets  1/14~1/28/20 SBRT to RUL nodule, 5000 cGy, every other day, 1000 cGy (Dr. Currie at [facility] [place]). No bx due to O2 dependence and too much risk  8/19/20 First visit with me   11/29/21 CT chest. New 10 mm RUL nodule  1/11/22 CT chest. New 7.2 mm RUL nodule, unchanged RUL nodules  3/31/22 CT chest. New RUL nodule from Jan 2022 7-->10 mm, new 5 mm ROBERT nodule  5/20~5/24/22 [facility] for COPD exacerbation.   6/24/22 CT chest non contrast.   8/16/22 EGD. 3 cm hiatal hernia, o/w normal  8/31/22 CT chest. 2.5 x 1.3 cm R midlung subpleural nodularity (4:98) previously 2.3 x 1.1 cm, slightly increased solid component   10/5/22 PET/CT. 2 x 1.3 cm irregular opacity posterior RUL (SUV 2.46), 2.4 x 0.8 cm linear opacity (SUV 4.76); there was bronchiectasia on prior imaging. Stable 1.1 x 0.8 cm non FDG avid RUL opacity and unchanged 4 mm posterior RUL nodule. No adenopathy. Inferior right breast uptake (SUV 6.09) likely infectious or inflammatory.   2/3~2/9/23 [facility] for COPD exacerbation  2/4/23 CTA during hospitalization. No  significant change in 2.3 x 1.5 cm spiculated nodule in the right upper lobe (series 7 image 94) with surrounding linear parenchymal opacities and subtle nodularity  2/20~2/24/23 [facility] for COPD exacerbation.   3/1/23 CT chest non contrast. 2.3 cm posterior RUL irregular nodule unchanged from 12/1/22 however slightly increased soft tissue component compared to 10/5/22 PET. Stable ROBERT mucous plugging with micronodularity.  3/22/23 CT chest. Stable compared to 3/1/23  5/25/23 CT chest abd w/contrast. Unchanged 1.3 x 0.5 cm R apical nodule, unchnaged 2.4 x 1.3 cm R upper nodular consolidation. New nodular/masslike area of consolidation anteriorly and laterally measuring 3.3 x 1.4 cm, not present on CT 3/22/23. No adenopathy. Consider PET or close surveillance CT in 3 months  7/19/23 Bronch, EBUS (Dr. aGrcia). 11L negative, scant cells. 4L unsatisfactory for eval. 8 negative, scant cellularity, 4R unsatisfactory, 11R negative, scan cellularity, RUL negative, showed acute inflammation, limited by scant cellularity.    ASSESSMENT AND PLAN  NSCLC, adeno, RUL: PET/CT in June worrisome for local recurrence at the prior SBRT site, but bx negative although limited by scant cellularity. That area looks worse now but in the setting of recent URI that required antibiotics and prednisone. Report is pending. Because of this, recommend 4-6 week interval follow up CT chest non contrast and go from there    COPD: Some recent exacerbations earlier this year, more stable over the warmer months, another one a few weeks ago. Remains O2 dependent.    Deconditioning: Getting a little worse. Recommended PT consult. It might be hard for her to get to outpatient PT but we'll see what options there are.     Dysphagia: Met with Dr. Briseno in GI 7/18/23, has had pretty extensive evaluation before. Next step is video swallow, possibly EGD if video swallow negative. No abnormalities in the head/neck are on PET 6/30/23, which is reassuring from a cancer  perspective. Not discussed today    DM2: Seeing Dr. Shabazz.    Peripheral neuropathy: Not discussed today. Mostly driven by DM    30 minutes spent by me on the date of the encounter doing chart review, history and exam, documentation and further activities per the note     Zarina Leung MD  Associate Professor of Medicine  Hematology, Oncology and Transplantation      SUBJECTIVE  Jenn returns for follow up.   Cataract surgery Sept. Seeing much better  Had URI 3 weeks ago - pretty significant. COPD exaervation - pred + abx    Add azithro to allergy list - intolerance - dizziness, balance impact     PAST MEDICAL HISTORY  Lung adenocarcinoma  DM2 with neuropathy  HCV IA, undetectable in 2019, treated  COPD. O2 dependent since late 2018. PFT 11/26/19. FEV1 0.64 (30%), FVC 1.69 (63%), DLCOunc 9.8 (38%).  HTN  H/o ITP  H/o disk herniation  Ankle surgery  Median neuropathy R  H/o sessile colon polyps 2014, h/o adenomas before that. Colonoscopy 2/7/18 @ [facility] [place]. Sessile serated adenoma x 1, tubular adenoma x 3  H/o chronic LBP  Dyslipidemia  Cataracts    CURRENT OUTPATIENT MEDICATIONS  Reviewed    ALLERGIES  Allergies   Allergen Reactions    Bee Venom Anaphylaxis    Interferons Dermatitis    Penicillins Hives    Aspirin      325mg     Colon Care       PHYSICAL EXAM  There were no vitals taken for this visit.    GEN: NAD  HEENT: EOMI, no icterus, injection or pallor  EXT: no edema  NEURO: alert    LABORATORY AND IMAGING STUDIES    Labs 9/30/23 and today were independently reviewed and interpreted by me    CT chest was personally reviewed and interpreted by me as above     Sincerely,        Zarina Leung MD

## 2023-10-26 NOTE — PROGRESS NOTES
[facility]    PATIENT NAME: Jenn Aparicio  MRN # [id number]   DATE OF VISIT: October 26, 2023  YOB: 1955     CANCER TYPE: Lung adenocarcinoma  STAGE: IA2 [id number] poorly diff adeno RLL, then [id number] IA2 suspected NSCLC RUL, medically inoperable     ECOG PS: 2    PD-L1: N/A  Lung panel: N/A  NGS: N/A    SUMMARY  12/24/15 PET/CT  1/13/15 CT lung bx. Adenocarcinoma  2/8/16 R thoracotomy, RLL lobectomy (Dr. Cunha). Adenocarcinoma, 1.1 cm, assoicated with atypical adenomatous hyperplasia  10/18/19 CTA for shortness of breath. New 1.3 cm RUL nodule  11/2019 PET/CT. 1.1 cm RUL hypermetabolic nodule (SUV 12.6)  12/26/19 Brain MRI negative for mets  1/14~1/28/20 SBRT to RUL nodule, 5000 cGy, every other day, 1000 [place] [facility] No bx due to O2 dependence and too much risk  8/19/20 First visit with me   11/29/21 CT chest. New 10 mm RUL nodule  1/11/22 CT chest. New 7.2 mm RUL nodule, unchanged RUL nodules  3/31/22 CT chest. New RUL nodule from Jan 2022 7-->10 mm, new 5 mm ROBERT nodule  5/20~5/24/22 [facility] for COPD exacerbation.   6/24/22 CT chest non contrast.   8/16/22 EGD. 3 cm hiatal hernia, o/w normal  8/31/22 CT chest. 2.5 x 1.3 cm R midlung subpleural nodularity (4:98) previously 2.3 x 1.1 cm, slightly increased solid component   10/5/22 PET/CT. 2 x 1.3 cm irregular opacity posterior RUL (SUV 2.46), 2.4 x 0.8 cm linear opacity (SUV 4.76); there was bronchiectasia on prior imaging. Stable 1.1 x 0.8 cm non FDG avid RUL opacity and unchanged 4 mm posterior RUL nodule. No adenopathy. Inferior right breast uptake (SUV 6.09) likely infectious or inflammatory.   2/3~2/9/23 [facility] for COPD exacerbation  2/4/23 CTA during hospitalization. No significant change in 2.3 x 1.5 cm spiculated nodule in the right upper lobe (series 7 image 94) with surrounding linear parenchymal opacities and subtle nodularity  2/20~2/24/23 [facility] for COPD exacerbation.   3/1/23 CT chest non contrast. 2.3 cm posterior RUL  irregular nodule unchanged from 12/1/22 however slightly increased soft tissue component compared to 10/5/22 PET. Stable ROBERT mucous plugging with micronodularity.  3/22/23 CT chest. Stable compared to 3/1/23  5/25/23 CT chest abd w/contrast. Unchanged 1.3 x 0.5 cm R apical nodule, unchnaged 2.4 x 1.3 cm R upper nodular consolidation. New nodular/masslike area of consolidation anteriorly and laterally measuring 3.3 x 1.4 cm, not present on CT 3/22/23. No adenopathy. Consider PET or close surveillance CT in 3 months  7/19/23 Bronch, EBUS (Dr. Garcia). 11L negative, scant cells. 4L unsatisfactory for eval. 8 negative, scant cellularity, 4R unsatisfactory, 11R negative, scan cellularity, RUL negative, showed acute inflammation, limited by scant cellularity.    ASSESSMENT AND PLAN  NSCLC, adeno, RUL: PET/CT in June worrisome for local recurrence at the prior SBRT site, but bx negative although limited by scant cellularity. That area looks worse now but in the setting of recent URI that required antibiotics and prednisone. Report is pending. Because of this, recommend 4-6 week interval follow up CT chest non contrast and go from there    COPD: Some recent exacerbations earlier this year, more stable over the warmer months, another one a few weeks ago. Remains O2 dependent.    Deconditioning: Getting a little worse. Recommended PT consult. It might be hard for her to get to outpatient PT but we'll see what options there are.     Dysphagia: Met with Dr. Briseno in GI 7/18/23, has had pretty extensive evaluation before. Next step is video swallow, possibly EGD if video swallow negative. No abnormalities in the head/neck are on PET 6/30/23, which is reassuring from a cancer perspective. Not discussed today    DM2: Seeing Dr. Shabazz.    Peripheral neuropathy: Not discussed today. Mostly driven by DM    30 minutes spent by me on the date of the encounter doing chart review, history and exam, documentation and further activities per  the note     Zarina Leung MD  Associate Professor of Medicine  Hematology, Oncology and Transplantation      SUBJECTIVE  Jenn returns for follow up.   Cataract surgery Sept. Seeing much better  Had URI 3 weeks ago - pretty significant. COPD exaervation - pred + abx    Add azithro to allergy list - intolerance - dizziness, balance impact     PAST MEDICAL HISTORY  Lung adenocarcinoma  DM2 with neuropathy  HCV IA, undetectable in 2019, treated  COPD. O2 dependent since late 2018. PFT 11/26/19. FEV1 0.64 (30%), FVC 1.69 (63%), DLCOunc 9.8 (38%).  HTN  H/o ITP  H/o disk herniation  Ankle surgery  Median neuropathy R  H/o sessile colon polyps 2014, h/o adenomas before that. Colonoscopy 2/7/18 @ [facility] [place]. Sessile serated adenoma x 1, tubular adenoma x 3  H/o chronic LBP  Dyslipidemia  Cataracts    CURRENT OUTPATIENT MEDICATIONS  Reviewed    ALLERGIES  Allergies   Allergen Reactions    Bee Venom Anaphylaxis    Interferons Dermatitis    Penicillins Hives    Azithromycin Dizziness    Aspirin      325mg     Colon Care      PHYSICAL EXAM  There were no vitals taken for this visit.    GEN: NAD  HEENT: EOMI, no icterus, injection or pallor  EXT: no edema  NEURO: alert    LABORATORY AND IMAGING STUDIES    Labs 9/30/23 and today were independently reviewed and interpreted by me    CT chest was personally reviewed and interpreted by me as above

## 2023-10-27 ENCOUNTER — TELEPHONE (OUTPATIENT)
Dept: EDUCATION SERVICES | Facility: CLINIC | Age: 68
End: 2023-10-27

## 2023-10-27 ENCOUNTER — OFFICE VISIT (OUTPATIENT)
Dept: ENDOCRINOLOGY | Facility: CLINIC | Age: 68
End: 2023-10-27
Payer: COMMERCIAL

## 2023-10-27 VITALS
HEART RATE: 92 BPM | WEIGHT: 210 LBS | DIASTOLIC BLOOD PRESSURE: 64 MMHG | SYSTOLIC BLOOD PRESSURE: 124 MMHG | BODY MASS INDEX: 36.05 KG/M2

## 2023-10-27 DIAGNOSIS — E66.01 CLASS 2 SEVERE OBESITY DUE TO EXCESS CALORIES WITH SERIOUS COMORBIDITY AND BODY MASS INDEX (BMI) OF 36.0 TO 36.9 IN ADULT (H): ICD-10-CM

## 2023-10-27 DIAGNOSIS — E11.65 TYPE 2 DIABETES MELLITUS WITH HYPERGLYCEMIA, WITHOUT LONG-TERM CURRENT USE OF INSULIN (H): Primary | ICD-10-CM

## 2023-10-27 DIAGNOSIS — E66.812 CLASS 2 SEVERE OBESITY DUE TO EXCESS CALORIES WITH SERIOUS COMORBIDITY AND BODY MASS INDEX (BMI) OF 36.0 TO 36.9 IN ADULT (H): ICD-10-CM

## 2023-10-27 DIAGNOSIS — E11.9 TYPE 2 DIABETES MELLITUS WITHOUT COMPLICATION, WITHOUT LONG-TERM CURRENT USE OF INSULIN (H): ICD-10-CM

## 2023-10-27 LAB — TSH SERPL DL<=0.005 MIU/L-ACNC: 3.79 UIU/ML (ref 0.3–4.2)

## 2023-10-27 PROCEDURE — 99215 OFFICE O/P EST HI 40 MIN: CPT | Performed by: INTERNAL MEDICINE

## 2023-10-27 RX ORDER — SEMAGLUTIDE 0.68 MG/ML
0.25 INJECTION, SOLUTION SUBCUTANEOUS WEEKLY
Qty: 3 ML | Refills: 5 | Status: SHIPPED | OUTPATIENT
Start: 2023-10-27 | End: 2024-01-11

## 2023-10-27 RX ORDER — ATORVASTATIN CALCIUM 10 MG/1
10 TABLET, FILM COATED ORAL AT BEDTIME
Qty: 30 TABLET | Refills: 5 | Status: SHIPPED | OUTPATIENT
Start: 2023-10-27 | End: 2024-01-11 | Stop reason: SINTOL

## 2023-10-27 RX ORDER — ACYCLOVIR 400 MG/1
TABLET ORAL
Qty: 1 EACH | Refills: 0 | Status: SHIPPED | OUTPATIENT
Start: 2023-10-27

## 2023-10-27 RX ORDER — ACYCLOVIR 400 MG/1
TABLET ORAL
Qty: 3 EACH | Refills: 5 | Status: SHIPPED | OUTPATIENT
Start: 2023-10-27 | End: 2024-01-26

## 2023-10-27 NOTE — PATIENT INSTRUCTIONS
-Continue Jardiance and Ozempic without changes  -Try Dexcom continuous glucose monitor--I will ask scheduling team to set up appointment with Nithya Narvaze to follow-up on how new continuous glucose monitor is working and how blood glucose values are  -Start Lipitor 10 mg daily at bedtime  -Fasting labs in 1 month  -Follow-up in 3 months (virtual is fine)  -We will communicate results via [facility], or if needed by phone

## 2023-10-27 NOTE — LETTER
10/27/2023         RE: Jenn Aparicio  [address]  [facility] [address]        Dear Colleague,    Thank you for referring your patient, Jenn Aparicio, to the [facility]. Please see a copy of my visit note below.      ENDOCRINOLOGY FOLLOW-UP        HISTORY OF PRESENT ILLNESS    Jenn Aparicio is seen in follow-up.    Has been off insulin since 6/2023 after Lantus dose was tapered down due to intermittent hypoglycemia (following with diabetes care and ) and ultimately discontinued by pharmacist.    We started Ozempic last visit in 4/2023: Confirms that she is taking Ozempic 0.25 mg weekly.  She has not yet escalated dose.  Tolerating fairly well, mild nausea the first day after injection.  She discontinued glipizide as we planned when she started Ozempic.    Has needed steroid burst in the past month for pulmonary issues.    Current diabetes regimen:  -Jardiance 25 mg daily  -Ozempic 0.25 mg weekly    Using Freestyle Marti but has had difficulty with sensor adhering to her arm: Consistently dislodging even with tape on top of sensor.  We have 1 glucose value in the past 2 weeks: 254.    She notes that she sometimes feels shakiness in the morning as if she has lower glucose values.    We had started B12 supplement at last visit: She thinks she is still taking this.    Has noted more leg weakness in recent months.  Having difficulty standing up from seated position.    Pertinent endocrine and related history:  1.  Diabetes mellitus type 2.  -Ms. Aparicio is uncertain when she was diagnosed with diabetes: Progress Notes as far back as 2008 mention diagnosis of type 2 diabetes.  She previously was seen in endocrinology at [facility].    Past diabetes medications:  -Metformin, developed diarrhea, has tried both extended release and short acting formulations  -Januvia appeared on medication list previously, does not recall side effect with this  -Insulin, recalls  developing hypoglycemia prompting discontinuation  2.  Diabetic peripheral neuropathy.    Her medical history is also notable for COPD (on home oxygen), hypertension, GERD with dysphagia, history of right lower lobe adenocarcinoma of the lung (post lobectomy in 2016) and right upper lobe non-small cell lung cancer (post radiation therapy in 2020).  A right upper lobe lung nodule has been noted to be increasing in size on chest CT.    PAST MEDICAL HISTORY  Past Medical History:   Diagnosis Date     Adenocarcinoma, lung (H)      Asthma      Ectopic pregnancy      Esophageal reflux      Pulmonary emphysema (H)     Very severe FEV1<30% predicted     Type II diabetes mellitus (H)        MEDICATIONS  Current Outpatient Medications   Medication Sig Dispense Refill     acetylcysteine (MUCOMYST) 10 % nebulizer solution Inhale 4 mLs into the lungs 4 times daily 480 mL 0     albuterol (PROAIR HFA/PROVENTIL HFA/VENTOLIN HFA) 108 (90 Base) MCG/ACT inhaler USE 1 OR 2 INHALATIONS EVERY 4 HOURS AS NEEDED (Patient taking differently: Inhale 1 puff into the lungs daily USE 1 OR 2 INHALATIONS EVERY 4 HOURS AS NEEDED) 17 g 3     albuterol (PROVENTIL) (2.5 MG/3ML) 0.083% neb solution Take 1 vial (2.5 mg) by nebulization 4 times daily 360 mL 0     Alpha Lipoic Acid 200 MG CAPS Take 1 capsule by mouth daily (Patient taking differently: Take 1 capsule by mouth every morning) 90 capsule 3     amLODIPine (NORVASC) 10 MG tablet Take 1 tablet (10 mg) by mouth daily 90 tablet 3     buPROPion (WELLBUTRIN XL) 300 MG 24 hr tablet Take 1 tablet (300 mg) by mouth every morning 90 tablet 3     carboxymethylcellulose (REFRESH LIQUIGEL) 1 % ophthalmic solution Apply 1 drop to eye 4 times daily 15 mL 4     cetirizine (ZYRTEC) 10 MG tablet Take 1 tablet (10 mg) by mouth every morning 30 tablet 11     Continuous Blood Gluc Sensor (FREESTYLE GIOVANNI 2 SENSOR) MISC 1 each every 14 days For use with Freestyle Giovanni 2  for continuous monitioring of  blood glucose levels. Replace sensor every 14 days. 2 each 11     cyanocobalamin (VITAMIN B-12) 500 MCG tablet Take 1 tablet (500 mcg) by mouth daily (Patient taking differently: Take 500 mcg by mouth every morning) 90 tablet 1     empagliflozin (JARDIANCE) 25 MG TABS tablet Take 1 tablet (25 mg) by mouth daily 90 tablet 0     fluticasone (FLONASE) 50 MCG/ACT nasal spray Spray 1 spray into both nostrils 2 times daily Use at night before bed 15.8 mL 3     Fluticasone-Umeclidin-Vilanterol (TRELEGY ELLIPTA) 200-62.5-25 MCG/ACT oral inhaler USE 1 INHALATION DAILY 28 each 5     GLUCOSAMINE-CHONDROITIN -400 MG tablet TAKE 1 TABLET DAILY (Patient taking differently: Take 1 tablet by mouth every evening) 90 tablet 3     ipratropium - albuterol 0.5 mg/2.5 mg/3 mL (DUONEB) 0.5-2.5 (3) MG/3ML neb solution Take 1 vial (3 mLs) by nebulization every 6 hours as needed for shortness of breath, wheezing or cough 90 mL 1     nicotine (NICOTROL) 10 MG inhaler Use 1 cartridge as needed for urge to smoke by puffing over course of 20min.  Use 6-16 cart/day; reduce number of cart/day over 6-12 weeks. 160 each 0     olopatadine (PATADAY) 0.2 % ophthalmic solution Place 0.05 mLs (1 drop) into both eyes daily 2.5 mL 11     omeprazole (PRILOSEC) 40 MG DR capsule Take 1 capsule (40 mg) by mouth 2 times daily for 180 days 180 capsule 1     pregabalin (LYRICA) 150 MG capsule TAKE 1 CAPSULE(150 MG) BY MOUTH TWICE DAILY 270 capsule 1     roflumilast (DALIRESP) 250 MCG TABS tablet Take 1 tablet (250 mcg) by mouth daily for 28 days, THEN 2 tablets (500 mcg) daily for 62 days. 152 tablet 0     semaglutide (OZEMPIC, 0.25 OR 0.5 MG/DOSE,) 2 MG/3ML pen Inject 0.5 mg Subcutaneous once a week 3 mL 11     EPINEPHrine (ANY BX GENERIC EQUIV) 0.3 MG/0.3ML injection 2-pack Inject 0.3 mLs (0.3 mg) into the muscle as needed for anaphylaxis (related to bee stings) May repeat one time in 5-15 minutes if response to initial dose is inadequate. (Patient not  taking: Reported on 10/27/2023) 2 each 1     insulin pen needle (BD MARCIO U/F) 32G X 4 MM miscellaneous Use 1 pen needle daily or as directed. 100 each 1     nicotine (NICODERM CQ) 14 MG/24HR 24 hr patch Place 1 patch onto the skin every 24 hours (Patient not taking: Reported on 10/27/2023)       nicotine (NICODERM CQ) 7 MG/24HR 24 hr patch Place 1 patch onto the skin every 24 hours (Patient not taking: Reported on 10/27/2023) 30 patch 0     nicotine (NICORETTE) 4 MG lozenge Place 1 lozenge (4 mg) inside cheek every hour as needed for smoking cessation (Patient not taking: Reported on 10/27/2023) 100 lozenge 0     omega-3 acid ethyl esters (LOVAZA) 1 g capsule Take 2 capsules (2 g) by mouth 2 times daily (Patient taking differently: Take 1 g by mouth 2 times daily Take 2 capsules (2 g) by mouth 2 times daily) 360 capsule 3     STATIN NOT PRESCRIBED (INTENTIONAL) Please choose reason not prescribed from choices below.         Allergies, family, and social history were reviewed and documented as needed in EHR.     REVIEW OF SYSTEMS  A focused ROS was performed, with pertinent positives and negatives as noted in the HPI.    PHYSICAL EXAM  /64 (BP Location: Right arm, Patient Position: Sitting, Cuff Size: Adult Regular)   Pulse 92   Wt 95.3 kg (210 lb)   BMI 36.05 kg/m    Body mass index is 36.05 kg/m .  Constitutional: Vital signs reviewed, as recorded above. Patient is alert, oriented and appears in no acute distress.  Eyes: PER, EOMI, no stare, lid lag, or retraction; no conjunctival injection.  ENMT: OP clear with moist MM. Lips are without lesions.   Neck: Neck supple, no palpable thyromegaly.  Lymphatic: No cervical or supraclavicular LAD.  Cardiovascular: RRR, normal S1/S2, right ankle edema.  Respiratory: Oxygen by nasal cannula.  CTAB, without wheezes, crackles or rhonchi; normal chest wall motion and respiratory effort.  MSK: No clubbing or cyanosis; normal muscle bulk and tone.  Skin: Normal skin  color, temperature, turgor and texture.  Neurological: Alert and oriented times 3.    Foot exam: DP and PT pulses present and symmetric.  Monofilament sensation partially impaired in left foot.  Vibratory sensation partially impaired in left foot.    DATA REVIEW  Each of the following laboratory and/or imaging studies were reviewed.    Component      Latest Ref Rng 8/23/2023  9:26 AM 8/23/2023  12:14 PM 10/26/2023  9:12 AM   WBC      4.0 - 11.0 10e3/uL   6.4    RBC Count      3.80 - 5.20 10e6/uL   4.92    Hemoglobin      11.7 - 15.7 g/dL   13.4    Hematocrit      35.0 - 47.0 %   43.7    MCV      78 - 100 fL   89    MCH      26.5 - 33.0 pg   27.2    MCHC      31.5 - 36.5 g/dL   30.7 (L)    RDW      10.0 - 15.0 %   13.5    Platelet Count      150 - 450 10e3/uL   174    % Neutrophils      %   54    % Lymphocytes      %   26    % Monocytes      %   9    % Eosinophils      %   10    % Basophils      %   1    % Immature Granulocytes      %   0    NRBCs per 100 WBC      <1 /100   0    Absolute Neutrophils      1.6 - 8.3 10e3/uL   3.5    Absolute Lymphocytes      0.8 - 5.3 10e3/uL   1.6    Absolute Monocytes      0.0 - 1.3 10e3/uL   0.5    Absolute Eosinophils      0.0 - 0.7 10e3/uL   0.7    Absolute Basophils      0.0 - 0.2 10e3/uL   0.1    Absolute Immature Granulocytes      <=0.4 10e3/uL   0.0    Absolute NRBCs      10e3/uL   0.0    Sodium      135 - 145 mmol/L  141  143    Potassium      3.4 - 5.3 mmol/L  3.8  3.8    Carbon Dioxide (CO2)      22 - 29 mmol/L  31 (H)  31 (H)    Anion Gap      7 - 15 mmol/L  8  8    Urea Nitrogen      8.0 - 23.0 mg/dL  11.2  5.1 (L)    Creatinine      0.51 - 0.95 mg/dL  0.58  0.56    GFR Estimate      >60 mL/min/1.73m2  >90  >90    Calcium      8.8 - 10.2 mg/dL  9.4  9.0    Chloride      98 - 107 mmol/L  102  104    Glucose      70 - 99 mg/dL  135 (H)  117 (H)    Alkaline Phosphatase      35 - 104 U/L   104    AST      0 - 45 U/L   18    ALT      0 - 50 U/L   10    Protein Total      6.4 -  8.3 g/dL   6.8    Albumin      3.5 - 5.2 g/dL   4.0    Bilirubin Total      <=1.2 mg/dL   0.3    Cholesterol      <200 mg/dL   220 (H)    Triglycerides      <150 mg/dL   96    HDL Cholesterol      >=50 mg/dL   55    LDL Cholesterol Calculated      <=100 mg/dL   146 (H)    Non HDL Cholesterol      <130 mg/dL   165 (H)    Creatinine Urine      mg/dL 58.2      Albumin Urine mg/L      mg/L 158.0      Albumin Urine mg/g Cr      0.00 - 25.00 mg/g Cr 271.48 (H)      Hemoglobin A1C      <5.7 %  7.5 (H)     Vitamin B12      232 - 1,245 pg/mL  415        Legend:  (H) High  (L) Low      ASSESSMENT  1.  Diabetes mellitus, type 2.  No CGM data however hemoglobin A1c in 8/2023 was significantly improved.  Given limited data and patient's description of intermittent symptoms suggestive of hypoglycemia, I will defer adjustment in medication regimen.  Having difficulty with keeping freestyle marlon CGM sensors on: We will trial Dexcom CGM if possible (prescription sent).  I have asked patient to follow-up with diabetes care and  to review glucose data and also if she needs help with troubleshooting new CGM system.  In the long-term, we can adjust GLP-1 receptor agonist dose as needed.    2.  Diabetes preventive care.  -Abnormal foot exam, foot care discussed today, management of diabetic neuropathy as below  -Microalbuminuria persistent on labs in 8/2023; on SGLT2 inhibitor, discuss ACE inhibitor at next visit  -Eye exam on 10/4/2023 showed no diabetic retinopathy    3.  Diabetic neuropathy.  On Lyrica.  Also taking ALA supplement.  B12 level checked in 4/2023 was borderline and we started supplementation.  Follow-up level in 8/2023 was improved.    4.  Proximal muscle weakness.  Noticing the symptoms in the past few months.  Could be related to exogenous glucocorticoid therapy.  Would check thyroid function test also (I have added this to labs drawn yesterday).    5.  Dyslipidemia.  Would recommend statin  therapy: We discussed benefits and potential side effects and Ms. Aparicio is open to trial of low-dose of statin.  She will update me if any side effects.  Check follow-up lipid profile and CMP with A1c in 1 month.    PLAN  -Continue Jardiance and Ozempic without changes  -Try Dexcom continuous glucose monitor--I will ask scheduling team to set up appointment with Nithya Solange to follow-up on how new continuous glucose monitor is working and how blood glucose values are  -Start Lipitor 10 mg daily at bedtime  -Fasting labs in 1 month  -Follow-up in 3 months (virtual visit is fine)  -We will communicate results via [employer], or if needed by phone      Orders Placed This Encounter   Procedures     TSH with free T4 reflex     Hemoglobin A1c     Comprehensive metabolic panel     Lipid Profile     I spent a total of 44 minutes on the date of encounter reviewing medical records, evaluating the patient, coordinating care and documenting in the EHR, as detailed above.      Juan Shabazz MD   Division of Diabetes, Endocrinology and Metabolism  Department of Medicine      Again, thank you for allowing me to participate in the care of your patient.        Sincerely,        JUAN Shabazz MD

## 2023-10-27 NOTE — PROGRESS NOTES
ENDOCRINOLOGY FOLLOW-UP        HISTORY OF PRESENT ILLNESS    Jenn Aparicio is seen in follow-up.    Has been off insulin since 6/2023 after Lantus dose was tapered down due to intermittent hypoglycemia (following with diabetes care and ) and ultimately discontinued by pharmacist.    We started Ozempic last visit in 4/2023: Confirms that she is taking Ozempic 0.25 mg weekly.  She has not yet escalated dose.  Tolerating fairly well, mild nausea the first day after injection.  She discontinued glipizide as we planned when she started Ozempic.    Has needed steroid burst in the past month for pulmonary issues.    Current diabetes regimen:  -Jardiance 25 mg daily  -Ozempic 0.25 mg weekly    Using Freestyle Marti but has had difficulty with sensor adhering to her arm: Consistently dislodging even with tape on top of sensor.  We have 1 glucose value in the past 2 weeks: 254.    She notes that she sometimes feels shakiness in the morning as if she has lower glucose values.    We had started B12 supplement at last visit: She thinks she is still taking this.    Has noted more leg weakness in recent months.  Having difficulty standing up from seated position.    Pertinent endocrine and related history:  1.  Diabetes mellitus type 2.  -Ms. Aparicio is uncertain when she was diagnosed with diabetes: Progress Notes as far back as 2008 mention diagnosis of type 2 diabetes.  She previously was seen in endocrinology at [facility].    Past diabetes medications:  -Metformin, developed diarrhea, has tried both extended release and short acting formulations  -Januvia appeared on medication list previously, does not recall side effect with this  -Insulin, recalls developing hypoglycemia prompting discontinuation  2.  Diabetic peripheral neuropathy.    Her medical history is also notable for COPD (on home oxygen), hypertension, GERD with dysphagia, history of right lower lobe adenocarcinoma of the lung (post  lobectomy in 2016) and right upper lobe non-small cell lung cancer (post radiation therapy in 2020).  A right upper lobe lung nodule has been noted to be increasing in size on chest CT.    PAST MEDICAL HISTORY  Past Medical History:   Diagnosis Date    Adenocarcinoma, lung (H)     Asthma     Ectopic pregnancy     Esophageal reflux     Pulmonary emphysema (H)     Very severe FEV1<30% predicted    Type II diabetes mellitus (H)        MEDICATIONS  Current Outpatient Medications   Medication Sig Dispense Refill    acetylcysteine (MUCOMYST) 10 % nebulizer solution Inhale 4 mLs into the lungs 4 times daily 480 mL 0    albuterol (PROAIR HFA/PROVENTIL HFA/VENTOLIN HFA) 108 (90 Base) MCG/ACT inhaler USE 1 OR 2 INHALATIONS EVERY 4 HOURS AS NEEDED (Patient taking differently: Inhale 1 puff into the lungs daily USE 1 OR 2 INHALATIONS EVERY 4 HOURS AS NEEDED) 17 g 3    albuterol (PROVENTIL) (2.5 MG/3ML) 0.083% neb solution Take 1 vial (2.5 mg) by nebulization 4 times daily 360 mL 0    Alpha Lipoic Acid 200 MG CAPS Take 1 capsule by mouth daily (Patient taking differently: Take 1 capsule by mouth every morning) 90 capsule 3    amLODIPine (NORVASC) 10 MG tablet Take 1 tablet (10 mg) by mouth daily 90 tablet 3    buPROPion (WELLBUTRIN XL) 300 MG 24 hr tablet Take 1 tablet (300 mg) by mouth every morning 90 tablet 3    carboxymethylcellulose (REFRESH LIQUIGEL) 1 % ophthalmic solution Apply 1 drop to eye 4 times daily 15 mL 4    cetirizine (ZYRTEC) 10 MG tablet Take 1 tablet (10 mg) by mouth every morning 30 tablet 11    Continuous Blood Gluc Sensor (FREESTYLE GIOVANNI 2 SENSOR) [facility] [place] each every 14 days For use with Freestyle Giovanni 2  for continuous monitioring of blood glucose levels. Replace sensor every 14 days. 2 each 11    cyanocobalamin (VITAMIN B-12) 500 MCG tablet Take 1 tablet (500 mcg) by mouth daily (Patient taking differently: Take 500 mcg by mouth every morning) 90 tablet 1    empagliflozin (JARDIANCE) 25 MG TABS  tablet Take 1 tablet (25 mg) by mouth daily 90 tablet 0    fluticasone (FLONASE) 50 MCG/ACT nasal spray Spray 1 spray into both nostrils 2 times daily Use at night before bed 15.8 mL 3    Fluticasone-Umeclidin-Vilanterol (TRELEGY ELLIPTA) 200-62.5-25 MCG/ACT oral inhaler USE 1 INHALATION DAILY 28 each 5    GLUCOSAMINE-CHONDROITIN -400 MG tablet TAKE 1 TABLET DAILY (Patient taking differently: Take 1 tablet by mouth every evening) 90 tablet 3    ipratropium - albuterol 0.5 mg/2.5 mg/3 mL (DUONEB) 0.5-2.5 (3) MG/3ML neb solution Take 1 vial (3 mLs) by nebulization every 6 hours as needed for shortness of breath, wheezing or cough 90 mL 1    nicotine (NICOTROL) 10 MG inhaler Use 1 cartridge as needed for urge to smoke by puffing over course of 20min.  Use 6-16 cart/day; reduce number of cart/day over 6-12 weeks. 160 each 0    olopatadine (PATADAY) 0.2 % ophthalmic solution Place 0.05 mLs (1 drop) into both eyes daily 2.5 mL 11    omeprazole (PRILOSEC) 40 MG DR capsule Take 1 capsule (40 mg) by mouth 2 times daily for 180 days 180 capsule 1    pregabalin (LYRICA) 150 MG capsule TAKE 1 CAPSULE(150 MG) BY MOUTH TWICE DAILY 270 capsule 1    roflumilast (DALIRESP) 250 MCG TABS tablet Take 1 tablet (250 mcg) by mouth daily for 28 days, THEN 2 tablets (500 mcg) daily for 62 days. 152 tablet 0    semaglutide (OZEMPIC, 0.25 OR 0.5 MG/DOSE,) 2 MG/3ML pen Inject 0.5 mg Subcutaneous once a week 3 mL 11    EPINEPHrine (ANY BX GENERIC EQUIV) 0.3 MG/0.3ML injection 2-pack Inject 0.3 mLs (0.3 mg) into the muscle as needed for anaphylaxis (related to bee stings) May repeat one time in 5-15 minutes if response to initial dose is inadequate. (Patient not taking: Reported on 10/27/2023) 2 each 1    insulin pen needle (BD MARCIO U/F) 32G X 4 MM miscellaneous Use 1 pen needle daily or as directed. 100 each 1    nicotine (NICODERM CQ) 14 MG/24HR 24 hr patch Place 1 patch onto the skin every 24 hours (Patient not taking: Reported on  10/27/2023)      nicotine (NICODERM CQ) 7 MG/24HR 24 hr patch Place 1 patch onto the skin every 24 hours (Patient not taking: Reported on 10/27/2023) 30 patch 0    nicotine (NICORETTE) 4 MG lozenge Place 1 lozenge (4 mg) inside cheek every hour as needed for smoking cessation (Patient not taking: Reported on 10/27/2023) 100 lozenge 0    omega-3 acid ethyl esters (LOVAZA) 1 g capsule Take 2 capsules (2 g) by mouth 2 times daily (Patient taking differently: Take 1 g by mouth 2 times daily Take 2 capsules (2 g) by mouth 2 times daily) 360 capsule 3    STATIN NOT PRESCRIBED (INTENTIONAL) Please choose reason not prescribed from choices below.         Allergies, family, and social history were reviewed and documented as needed in EHR.     REVIEW OF SYSTEMS  A focused ROS was performed, with pertinent positives and negatives as noted in the HPI.    PHYSICAL EXAM  /64 (BP Location: Right arm, Patient Position: Sitting, Cuff Size: Adult Regular)   Pulse 92   Wt 95.3 kg (210 lb)   BMI 36.05 kg/m    Body mass index is 36.05 kg/m .  Constitutional: Vital signs reviewed, as recorded above. Patient is alert, oriented and appears in no acute distress.  Eyes: PER, EOMI, no stare, lid lag, or retraction; no conjunctival injection.  ENMT: OP clear with moist MM. Lips are without lesions.   Neck: Neck supple, no palpable thyromegaly.  Lymphatic: No cervical or supraclavicular LAD.  Cardiovascular: RRR, normal S1/S2, right ankle edema.  Respiratory: Oxygen by nasal cannula.  CTAB, without wheezes, crackles or rhonchi; normal chest wall motion and respiratory effort.  MSK: No clubbing or cyanosis; normal muscle bulk and tone.  Skin: Normal skin color, temperature, turgor and texture.  Neurological: Alert and oriented times 3.    Foot exam: DP and PT pulses present and symmetric.  Monofilament sensation partially impaired in left foot.  Vibratory sensation partially impaired in left foot.    DATA REVIEW  Each of the following  laboratory and/or imaging studies were reviewed.    Component      Latest Ref Rng 8/23/2023  9:26 AM 8/23/2023  12:14 PM 10/26/2023  9:12 AM   WBC      4.0 - 11.0 10e3/uL   6.4    RBC Count      3.80 - 5.20 10e6/uL   4.92    Hemoglobin      11.7 - 15.7 g/dL   13.4    Hematocrit      35.0 - 47.0 %   43.7    MCV      78 - 100 fL   89    MCH      26.5 - 33.0 pg   27.2    MCHC      31.5 - 36.5 g/dL   30.7 (L)    RDW      10.0 - 15.0 %   13.5    Platelet Count      150 - 450 10e3/uL   174    % Neutrophils      %   54    % Lymphocytes      %   26    % Monocytes      %   9    % Eosinophils      %   10    % Basophils      %   1    % Immature Granulocytes      %   0    NRBCs per 100 WBC      <1 /100   0    Absolute Neutrophils      1.6 - 8.3 10e3/uL   3.5    Absolute Lymphocytes      0.8 - 5.3 10e3/uL   1.6    Absolute Monocytes      0.0 - 1.3 10e3/uL   0.5    Absolute Eosinophils      0.0 - 0.7 10e3/uL   0.7    Absolute Basophils      0.0 - 0.2 10e3/uL   0.1    Absolute Immature Granulocytes      <=0.4 10e3/uL   0.0    Absolute NRBCs      10e3/uL   0.0    Sodium      135 - 145 mmol/L  141  143    Potassium      3.4 - 5.3 mmol/L  3.8  3.8    Carbon Dioxide (CO2)      22 - 29 mmol/L  31 (H)  31 (H)    Anion Gap      7 - 15 mmol/L  8  8    Urea Nitrogen      8.0 - 23.0 mg/dL  11.2  5.1 (L)    Creatinine      0.51 - 0.95 mg/dL  0.58  0.56    GFR Estimate      >60 mL/min/1.73m2  >90  >90    Calcium      8.8 - 10.2 mg/dL  9.4  9.0    Chloride      98 - 107 mmol/L  102  104    Glucose      70 - 99 mg/dL  135 (H)  117 (H)    Alkaline Phosphatase      35 - 104 U/L   104    AST      0 - 45 U/L   18    ALT      0 - 50 U/L   10    Protein Total      6.4 - 8.3 g/dL   6.8    Albumin      3.5 - 5.2 g/dL   4.0    Bilirubin Total      <=1.2 mg/dL   0.3    Cholesterol      <200 mg/dL   220 (H)    Triglycerides      <150 mg/dL   96    HDL Cholesterol      >=50 mg/dL   55    LDL Cholesterol Calculated      <=100 mg/dL   146 (H)    Non HDL  Cholesterol      <130 mg/dL   165 (H)    Creatinine Urine      mg/dL 58.2      Albumin Urine mg/L      mg/L 158.0      Albumin Urine mg/g Cr      0.00 - 25.00 mg/g Cr 271.48 (H)      Hemoglobin A1C      <5.7 %  7.5 (H)     Vitamin B12      232 - 1,245 pg/mL  415        Legend:  (H) High  (L) Low      ASSESSMENT  1.  Diabetes mellitus, type 2.  No CGM data however hemoglobin A1c in 8/2023 was significantly improved.  Given limited data and patient's description of intermittent symptoms suggestive of hypoglycemia, I will defer adjustment in medication regimen.  Having difficulty with keeping freestyle marlon CGM sensors on: We will trial Dexcom CGM if possible (prescription sent).  I have asked patient to follow-up with diabetes care and  to review glucose data and also if she needs help with troubleshooting new CGM system.  In the long-term, we can adjust GLP-1 receptor agonist dose as needed.    2.  Diabetes preventive care.  -Abnormal foot exam, foot care discussed today, management of diabetic neuropathy as below  -Microalbuminuria persistent on labs in 8/2023; on SGLT2 inhibitor, discuss ACE inhibitor at next visit  -Eye exam on 10/4/2023 showed no diabetic retinopathy    3.  Diabetic neuropathy.  On Lyrica.  Also taking ALA supplement.  B12 level checked in 4/2023 was borderline and we started supplementation.  Follow-up level in 8/2023 was improved.    4.  Proximal muscle weakness.  Noticing the symptoms in the past few months.  Could be related to exogenous glucocorticoid therapy.  Would check thyroid function test also (I have added this to labs drawn yesterday).    5.  Dyslipidemia.  Would recommend statin therapy: We discussed benefits and potential side effects and Ms. Aparicio is open to trial of low-dose of statin.  She will update me if any side effects.  Check follow-up lipid profile and CMP with A1c in 1 month.    PLAN  -Continue Jardiance and Ozempic without changes  -Try Dexcom  continuous glucose monitor--I will ask scheduling team to set up appointment with Nithya Solange to follow-up on how new continuous glucose monitor is working and how blood glucose values are  -Start Lipitor 10 mg daily at bedtime  -Fasting labs in 1 month  -Follow-up in 3 months (virtual visit is fine)  -We will communicate results via [employer], or if needed by phone      Orders Placed This Encounter   Procedures    TSH with free T4 reflex    Hemoglobin A1c    Comprehensive metabolic panel    Lipid Profile     I spent a total of 44 minutes on the date of encounter reviewing medical records, evaluating the patient, coordinating care and documenting in the EHR, as detailed above.      La Nena Shabazz MD   Division of Diabetes, Endocrinology and Metabolism  Department of Medicine

## 2023-11-08 ENCOUNTER — VIRTUAL VISIT (OUTPATIENT)
Dept: EDUCATION SERVICES | Facility: CLINIC | Age: 68
End: 2023-11-08
Payer: COMMERCIAL

## 2023-11-08 DIAGNOSIS — E11.42 TYPE 2 DIABETES MELLITUS WITH DIABETIC POLYNEUROPATHY, WITHOUT LONG-TERM CURRENT USE OF INSULIN (H): Primary | ICD-10-CM

## 2023-11-08 PROCEDURE — G0108 DIAB MANAGE TRN  PER INDIV: HCPCS | Mod: VID

## 2023-11-08 ASSESSMENT — PAIN SCALES - GENERAL: PAINLEVEL: NO PAIN (0)

## 2023-11-08 NOTE — PROGRESS NOTES
DIABETES SELF MANAGEMENT EDUCATION:   Jenn Aparicio presents today for education related to Type 2 diabetes.  She is accompanied by self  Referring Provider: Felisha Castro MD    DIABETES RELATED CONCERNS/COMMENTS: monitoring what she eats, trying to lower cholesterol, started herself on Dexcom G7 with a   Past Diabetes Education: Yes  Support system: family  Living Situation: lives alone, just moved   Occupation: retired    ASSESSMENT:  Patient Problem List and Medication List reviewed for relevant medical history, current medical status, and diabetes risk factors.    Current Diabetes Management per Patient:  Jardiance 25 mg daily, Ozempic 0.25  At risk for hypoglycemia? Yes , Symptoms: Shaky and Confusion and Frequency 7 times per week,   BG meter: Dexcom G7  Patient glucose self monitoring as follows: has checked with blood sugars  BG results: 78 mg/dl  ( over the last month per patient memory)  Patient's most recent A1C:   Lab Results   Component Value Date    A1C 9.8 08/31/2022    A1C 8.4 05/20/2022    A1C 8.4 01/27/2022    A1C 8.3 01/11/2022    A1C 7.0 02/24/2021    A1C 6.8 09/08/2020    A1C 7.1 12/01/2015     Nutrition:  Appetite is variable not eating at consistent times or amounts    Breakfast: (9-10a)  toast or sausage breakfast sandwich, coffee, cream and raw sugar, skips  Snack:   Lunch:(12p-3p) leftovers, chili  Snack:   Dinner: (5-7p) cabbage, chicken  Snack:     Physical Activity:     Walking in house or at stores    Diabetes Complications:  Numbness in hands or feet--not as bad as it was    INTERVENTION:   Education provided today on:  Has started the Dexcom with a  so we cannot see the data.  She states she has frequent lows despite being off insulin and on a non-therapeutic dose of Ozempic.  She is asked to try to be consistent with intake and eat at regular times.    Teaching done on ways to prevent and treat low blood sugar.    We set up an in person appointment for next  week to set up phone yadira for her.    PLAN:  Consistency with meal time and carb amounts  Come in 11/16 at 11:30 to get phone yadira installed on your phone    FOLLOW-UP:  Time Spent: 30 minutes  Encounter Type: Individual    Any diabetes medication dose changes were made via the CDE Protocol and Collaborative Practice Agreement with  Physicians.

## 2023-11-08 NOTE — NURSING NOTE
Is the patient currently in the state of [place]? YES    Visit mode:VIDEO    If the visit is dropped, the patient can be reconnected by: VIDEO VISIT: Text to cell phone:   Telephone Information:   Mobile [phone]       Will anyone else be joining the visit? NO  (If patient encounters technical issues they should call [phone]    How would you like to obtain your AVS? MyChart    Are changes needed to the allergy or medication list? No    Reason for visit: RECHECK Shelby Kocher VVF

## 2023-11-09 ENCOUNTER — HOSPITAL ENCOUNTER (OUTPATIENT)
Facility: CLINIC | Age: 68
End: 2023-11-09
Attending: INTERNAL MEDICINE | Admitting: INTERNAL MEDICINE
Payer: COMMERCIAL

## 2023-11-09 ENCOUNTER — TELEPHONE (OUTPATIENT)
Dept: GASTROENTEROLOGY | Facility: CLINIC | Age: 68
End: 2023-11-09
Payer: COMMERCIAL

## 2023-11-09 DIAGNOSIS — Z12.11 SPECIAL SCREENING FOR MALIGNANT NEOPLASMS, COLON: Primary | ICD-10-CM

## 2023-11-09 NOTE — TELEPHONE ENCOUNTER
"Endoscopy Scheduling Screen    Have you had a positive Covid test in the last 14 days?  No    Are you active on MyChart?   Yes    What insurance is in the chart?  Other:  [facility]    Ordering/Referring Provider: Gabriela GILMORE   (If ordering provider performs procedure, schedule with ordering provider unless otherwise instructed. )    BMI: Estimated body mass index is 36.05 kg/m  as calculated from the following:    Height as of 10/5/23: 1.626 m (5' 4\").    Weight as of 10/27/23: 95.3 kg (210 lb).     Sedation Ordered  MAC/deep sedation.   BMI<= 45 45 < BMI <= 48 48 < BMI < = 50  BMI > 50   No Restrictions No [facility]  No [facility]  [facility] with exceptions Hospital Only OR Only       Are you taking any prescription medications for pain 3 or more times per week?   No    Do you have a history of malignant hyperthermia or adverse reaction to anesthesia?  No    (Females) Are you currently pregnant?   No     Have you been diagnosed or told you have pulmonary hypertension?   No    Do you have an LVAD?  No    Have you been told you have moderate to severe sleep apnea?  Yes (RN Review required for scheduling unless scheduling in Hospital.)    Have you been told you have COPD, asthma, or any other lung disease?  Yes     What breathing problems do you have?  COPD and Asthma     Do you use home oxygen?  Yes (Hospital Only)    Have your breathing problems required an ED visit or hospitalization in the last year?  Yes (RN Review required for scheduling unless scheduling in Hospital.)    Do you have any heart conditions?  No     Have you ever had an organ transplant?   No    Have you ever had or are you awaiting a heart or lung transplant?   No    Have you had a stroke or transient ischemic attack (TIA aka \"mini stroke\" in the last 6 months?   No    Have you been diagnosed with or been told you have cirrhosis of the liver?   No    Are you currently on dialysis?   No    Do you need assistance transferring?   No    BMI: Estimated body " "mass index is 36.05 kg/m  as calculated from the following:    Height as of 10/5/23: 1.626 m (5' 4\").    Weight as of 10/27/23: 95.3 kg (210 lb).     Is patients BMI > 40 and scheduling location [facility]?  No    Do you take an injectable medication for weight loss or diabetes (excluding insulin)?  Yes, hold time can be up to 7 days. Please check with you prescribing provider for recommendation.  Ozempic    Do you take the medication Naltrexone?  No    Do you take blood thinners?  No       Prep   Are you currently on dialysis or do you have chronic kidney disease?  No    Do you have a diagnosis of diabetes?  Yes (Golytely Prep)    Do you have a diagnosis of cystic fibrosis (CF)?  No    On a regular basis do you go 3 -5 days between bowel movements?  No    BMI > 40?  No    Preferred Pharmacy:      [id number] - [place], [place] - [address] AT [facility] OF  [address]  [address]  [facility] [address]  Phone: [phone] Fax: [phone]        Final Scheduling Details   Colonoscopy prep sent?  Standard Golytely    Procedure scheduled  Colonoscopy    Surgeon:  Finn    Date of procedure:  03/21/2024     Pre-OP / PAC:   No - Not required for this site.    Location  [facility] - Per order.    Sedation   MAC/Deep Sedation - Per order.      Patient Reminders:   You will receive a call from a Nurse to review instructions and health history.  This assessment must be completed prior to your procedure.  Failure to complete the Nurse assessment may result in the procedure being cancelled.      On the day of your procedure, please designate an adult(s) who can drive you home stay with you for the next 24 hours. The medicines used in the exam will make you sleepy. You will not be able to drive.      You cannot take public transportation, ride share services, or non-medical taxi service without a responsible caregiver.  Medical transport services are allowed with the requirement that a responsible " caregiver will receive you at your destination.  We require that drivers and caregivers are confirmed prior to your procedure.

## 2023-11-27 ENCOUNTER — LAB (OUTPATIENT)
Dept: LAB | Facility: CLINIC | Age: 68
End: 2023-11-27
Payer: COMMERCIAL

## 2023-11-27 DIAGNOSIS — C34.31 MALIGNANT NEOPLASM OF LOWER LOBE OF RIGHT LUNG (H): ICD-10-CM

## 2023-11-27 DIAGNOSIS — E11.65 TYPE 2 DIABETES MELLITUS WITH HYPERGLYCEMIA, WITHOUT LONG-TERM CURRENT USE OF INSULIN (H): ICD-10-CM

## 2023-11-27 LAB
ALBUMIN SERPL BCG-MCNC: 4.3 G/DL (ref 3.5–5.2)
ALP SERPL-CCNC: 105 U/L (ref 40–150)
ALT SERPL W P-5'-P-CCNC: 8 U/L (ref 0–50)
ANION GAP SERPL CALCULATED.3IONS-SCNC: 9 MMOL/L (ref 7–15)
AST SERPL W P-5'-P-CCNC: 17 U/L (ref 0–45)
BASOPHILS # BLD AUTO: 0.1 10E3/UL (ref 0–0.2)
BASOPHILS NFR BLD AUTO: 0 %
BILIRUB SERPL-MCNC: 0.5 MG/DL
BUN SERPL-MCNC: 12.1 MG/DL (ref 8–23)
CALCIUM SERPL-MCNC: 9.3 MG/DL (ref 8.8–10.2)
CHLORIDE SERPL-SCNC: 103 MMOL/L (ref 98–107)
CHOLEST SERPL-MCNC: 166 MG/DL
CREAT SERPL-MCNC: 0.59 MG/DL (ref 0.51–0.95)
DEPRECATED HCO3 PLAS-SCNC: 31 MMOL/L (ref 22–29)
EGFRCR SERPLBLD CKD-EPI 2021: >90 ML/MIN/1.73M2
EOSINOPHIL # BLD AUTO: 0.6 10E3/UL (ref 0–0.7)
EOSINOPHIL NFR BLD AUTO: 5 %
ERYTHROCYTE [DISTWIDTH] IN BLOOD BY AUTOMATED COUNT: 13.7 % (ref 10–15)
GLUCOSE SERPL-MCNC: 142 MG/DL (ref 70–99)
HBA1C MFR BLD: 8.1 %
HCT VFR BLD AUTO: 45.5 % (ref 35–47)
HDLC SERPL-MCNC: 76 MG/DL
HGB BLD-MCNC: 14.2 G/DL (ref 11.7–15.7)
IMM GRANULOCYTES # BLD: 0 10E3/UL
IMM GRANULOCYTES NFR BLD: 0 %
LDLC SERPL CALC-MCNC: 77 MG/DL
LYMPHOCYTES # BLD AUTO: 1.4 10E3/UL (ref 0.8–5.3)
LYMPHOCYTES NFR BLD AUTO: 12 %
MCH RBC QN AUTO: 27.6 PG (ref 26.5–33)
MCHC RBC AUTO-ENTMCNC: 31.2 G/DL (ref 31.5–36.5)
MCV RBC AUTO: 88 FL (ref 78–100)
MONOCYTES # BLD AUTO: 0.8 10E3/UL (ref 0–1.3)
MONOCYTES NFR BLD AUTO: 7 %
NEUTROPHILS # BLD AUTO: 8.6 10E3/UL (ref 1.6–8.3)
NEUTROPHILS NFR BLD AUTO: 76 %
NONHDLC SERPL-MCNC: 90 MG/DL
NRBC # BLD AUTO: 0 10E3/UL
NRBC BLD AUTO-RTO: 0 /100
PLATELET # BLD AUTO: 188 10E3/UL (ref 150–450)
POTASSIUM SERPL-SCNC: 4.4 MMOL/L (ref 3.4–5.3)
PROT SERPL-MCNC: 7.3 G/DL (ref 6.4–8.3)
RBC # BLD AUTO: 5.15 10E6/UL (ref 3.8–5.2)
SODIUM SERPL-SCNC: 143 MMOL/L (ref 135–145)
TRIGL SERPL-MCNC: 64 MG/DL
WBC # BLD AUTO: 11.5 10E3/UL (ref 4–11)

## 2023-11-27 PROCEDURE — 36415 COLL VENOUS BLD VENIPUNCTURE: CPT | Performed by: PATHOLOGY

## 2023-11-27 PROCEDURE — 80061 LIPID PANEL: CPT | Performed by: PATHOLOGY

## 2023-11-27 PROCEDURE — 83036 HEMOGLOBIN GLYCOSYLATED A1C: CPT | Performed by: INTERNAL MEDICINE

## 2023-11-27 PROCEDURE — 80053 COMPREHEN METABOLIC PANEL: CPT | Performed by: PATHOLOGY

## 2023-11-27 PROCEDURE — 99000 SPECIMEN HANDLING OFFICE-LAB: CPT | Performed by: PATHOLOGY

## 2023-11-27 PROCEDURE — 85025 COMPLETE CBC W/AUTO DIFF WBC: CPT | Performed by: PATHOLOGY

## 2023-11-29 DIAGNOSIS — E11.65 TYPE 2 DIABETES MELLITUS WITH HYPERGLYCEMIA, WITHOUT LONG-TERM CURRENT USE OF INSULIN (H): ICD-10-CM

## 2023-11-29 NOTE — TELEPHONE ENCOUNTER
Requested Prescriptions   Signed Prescriptions Disp Refills    empagliflozin (JARDIANCE) 25 MG TABS tablet 90 tablet 0     Sig: Take 1 tablet (25 mg) by mouth daily       There is no refill protocol information for this order

## 2023-11-30 ENCOUNTER — ALLIED HEALTH/NURSE VISIT (OUTPATIENT)
Dept: EDUCATION SERVICES | Facility: CLINIC | Age: 68
End: 2023-11-30
Payer: COMMERCIAL

## 2023-11-30 DIAGNOSIS — E11.42 TYPE 2 DIABETES MELLITUS WITH DIABETIC POLYNEUROPATHY, WITHOUT LONG-TERM CURRENT USE OF INSULIN (H): Primary | ICD-10-CM

## 2023-11-30 PROCEDURE — G0108 DIAB MANAGE TRN  PER INDIV: HCPCS

## 2023-11-30 NOTE — PROGRESS NOTES
DIABETES SELF MANAGEMENT EDUCATION:   Jenn Aparicio presents today for education related to Type 2 diabetes.  She is accompanied by self  Referring Provider: Felisha Castro MD    DIABETES RELATED CONCERNS/COMMENTS: starting Dexcom on phone yadira  Past Diabetes Education: Yes  Support system: family  Living Situation: lives alone, just moved   Occupation: retired    ASSESSMENT:  Patient Problem List and Medication List reviewed for relevant medical history, current medical status, and diabetes risk factors.    Current Diabetes Management per Patient:  Jardiance 25 mg daily, Ozempic 0.25  At risk for hypoglycemia? Yes , Symptoms: Shaky and Confusion and Frequency 7 times per week,   BG meter: Dexcom G7  Patient glucose self monitoring as follows: has checked with blood sugars  BG results: 78 mg/dl  ( over the last month per patient memory)  Patient's most recent A1C:   Lab Results   Component Value Date    A1C 9.8 08/31/2022    A1C 8.4 05/20/2022    A1C 8.4 01/27/2022    A1C 8.3 01/11/2022    A1C 7.0 02/24/2021    A1C 6.8 09/08/2020    A1C 7.1 12/01/2015     Nutrition:  Appetite is variable not eating at consistent times or amounts    Breakfast: (9-10a)  toast or sausage breakfast sandwich, coffee, cream and raw sugar, skips  Snack:   Lunch:(12p-3p) leftovers, chili  Snack:   Dinner: (5-7p) cabbage, chicken  Snack:     Physical Activity:     Walking in house or at stores    Diabetes Complications:  Numbness in hands or feet--not as bad as it was    INTERVENTION:   Education provided today on:  We started the Dexcom on her phone yadira today.  We got her connected to our portal so info should be available for her upcoming visits.  Jenn had started herself on the  but we powered that down today so she is not getting double alarms.  She will use her phone to get glucose data from now on.  She will leave the yadira open in the back ground of her phone.    Jenn is on Ozempic 0.25 and is wondering if she should go  up to 0.5 after her next 0.25 pen.  She had tried to increase before and got nausea so it was put back to 0.25.  Will check with MD.    PLAN:  Use Dexcom going forward and leave yadira open on your phone.  Will let you know whether to increase Ozempic or not.    FOLLOW-UP:  Time Spent: 60 minutes  Encounter Type: Individual    Any diabetes medication dose changes were made via the CDE Protocol and Collaborative Practice Agreement with  Physicians.

## 2023-12-07 DIAGNOSIS — E11.9 TYPE 2 DIABETES MELLITUS WITHOUT COMPLICATION, WITHOUT LONG-TERM CURRENT USE OF INSULIN (H): ICD-10-CM

## 2023-12-07 RX ORDER — SEMAGLUTIDE 0.68 MG/ML
0.5 INJECTION, SOLUTION SUBCUTANEOUS WEEKLY
Qty: 3 ML | Refills: 5 | Status: CANCELLED | OUTPATIENT
Start: 2023-12-07

## 2023-12-12 ENCOUNTER — ANCILLARY PROCEDURE (OUTPATIENT)
Dept: CT IMAGING | Facility: CLINIC | Age: 68
End: 2023-12-12
Attending: INTERNAL MEDICINE
Payer: COMMERCIAL

## 2023-12-12 DIAGNOSIS — C34.31 MALIGNANT NEOPLASM OF LOWER LOBE OF RIGHT LUNG (H): ICD-10-CM

## 2023-12-12 PROCEDURE — 71250 CT THORAX DX C-: CPT | Mod: GC | Performed by: RADIOLOGY

## 2023-12-13 ENCOUNTER — ANCILLARY PROCEDURE (OUTPATIENT)
Dept: MAMMOGRAPHY | Facility: CLINIC | Age: 68
End: 2023-12-13
Attending: NURSE PRACTITIONER
Payer: COMMERCIAL

## 2023-12-13 ENCOUNTER — ONCOLOGY VISIT (OUTPATIENT)
Dept: ONCOLOGY | Facility: CLINIC | Age: 68
End: 2023-12-13
Attending: STUDENT IN AN ORGANIZED HEALTH CARE EDUCATION/TRAINING PROGRAM
Payer: COMMERCIAL

## 2023-12-13 ENCOUNTER — OFFICE VISIT (OUTPATIENT)
Dept: PULMONOLOGY | Facility: CLINIC | Age: 68
End: 2023-12-13
Attending: STUDENT IN AN ORGANIZED HEALTH CARE EDUCATION/TRAINING PROGRAM
Payer: COMMERCIAL

## 2023-12-13 VITALS
TEMPERATURE: 99 F | DIASTOLIC BLOOD PRESSURE: 77 MMHG | RESPIRATION RATE: 20 BRPM | WEIGHT: 211.7 LBS | BODY MASS INDEX: 36.34 KG/M2 | SYSTOLIC BLOOD PRESSURE: 133 MMHG | HEART RATE: 100 BPM | OXYGEN SATURATION: 92 %

## 2023-12-13 VITALS — DIASTOLIC BLOOD PRESSURE: 73 MMHG | OXYGEN SATURATION: 91 % | HEART RATE: 99 BPM | SYSTOLIC BLOOD PRESSURE: 129 MMHG

## 2023-12-13 DIAGNOSIS — B37.31 VAGINAL CANDIDIASIS: ICD-10-CM

## 2023-12-13 DIAGNOSIS — Z12.31 ENCOUNTER FOR SCREENING MAMMOGRAM FOR BREAST CANCER: ICD-10-CM

## 2023-12-13 DIAGNOSIS — J96.21 ACUTE AND CHRONIC RESPIRATORY FAILURE WITH HYPOXIA (H): ICD-10-CM

## 2023-12-13 DIAGNOSIS — J44.9 CHRONIC OBSTRUCTIVE PULMONARY DISEASE, UNSPECIFIED COPD TYPE (H): Primary | ICD-10-CM

## 2023-12-13 DIAGNOSIS — C34.31 MALIGNANT NEOPLASM OF LOWER LOBE OF RIGHT LUNG (H): Primary | ICD-10-CM

## 2023-12-13 DIAGNOSIS — J43.9 PULMONARY EMPHYSEMA, UNSPECIFIED EMPHYSEMA TYPE (H): ICD-10-CM

## 2023-12-13 PROCEDURE — 99214 OFFICE O/P EST MOD 30 MIN: CPT | Mod: GC | Performed by: STUDENT IN AN ORGANIZED HEALTH CARE EDUCATION/TRAINING PROGRAM

## 2023-12-13 PROCEDURE — 77067 SCR MAMMO BI INCL CAD: CPT | Mod: GC | Performed by: RADIOLOGY

## 2023-12-13 PROCEDURE — 77063 BREAST TOMOSYNTHESIS BI: CPT | Mod: GC | Performed by: RADIOLOGY

## 2023-12-13 PROCEDURE — 99214 OFFICE O/P EST MOD 30 MIN: CPT | Performed by: INTERNAL MEDICINE

## 2023-12-13 PROCEDURE — G0463 HOSPITAL OUTPT CLINIC VISIT: HCPCS | Mod: 27 | Performed by: STUDENT IN AN ORGANIZED HEALTH CARE EDUCATION/TRAINING PROGRAM

## 2023-12-13 PROCEDURE — G0463 HOSPITAL OUTPT CLINIC VISIT: HCPCS | Performed by: INTERNAL MEDICINE

## 2023-12-13 RX ORDER — FLUCONAZOLE 150 MG/1
150 TABLET ORAL ONCE
Qty: 1 TABLET | Refills: 0 | Status: SHIPPED | OUTPATIENT
Start: 2023-12-13 | End: 2023-12-13

## 2023-12-13 RX ORDER — DOXYCYCLINE HYCLATE 100 MG
100 TABLET ORAL 2 TIMES DAILY
Qty: 10 TABLET | Refills: 3 | Status: SHIPPED | OUTPATIENT
Start: 2023-12-13 | End: 2023-12-18

## 2023-12-13 RX ORDER — ROFLUMILAST 500 UG/1
500 TABLET ORAL DAILY
Qty: 90 TABLET | Refills: 3 | Status: SHIPPED | OUTPATIENT
Start: 2023-12-13 | End: 2024-03-20

## 2023-12-13 RX ORDER — PREDNISONE 20 MG/1
TABLET ORAL
Qty: 15 TABLET | Refills: 0 | Status: SHIPPED | OUTPATIENT
Start: 2023-12-13 | End: 2023-12-25

## 2023-12-13 RX ORDER — PREDNISONE 20 MG/1
40 TABLET ORAL DAILY
Qty: 10 TABLET | Refills: 3 | Status: SHIPPED | OUTPATIENT
Start: 2023-12-13 | End: 2023-12-18

## 2023-12-13 ASSESSMENT — PAIN SCALES - GENERAL: PAINLEVEL: EXTREME PAIN (9)

## 2023-12-13 NOTE — PROGRESS NOTES
[facility]   Pulmonary Clinic  Consult Note 12/13/23  Jenn Aparicio MRN: [id number]      Assessment & Plan      Jenn Aparicio is a 68 year old female with very severe COPD (FEV1 0.55, Z-4.42 4/2023), RLL adeno s/p lobectomy (2016), RUL NSCLC s/p SBRT (1/2020), ongoing tobacco use, DM2, and HTN who initially established with pulmonary clinic 12/2020 after transferring her oncology care to [facility].  She was last seen 8/2022.  Since that time, she was hospitalized 1/1-1/4/2023, 2/3-2/9/2023, and 2/20-2/24/2023 for severe COPD exacerbations without any overt triggers other than recurrent reflux and thick bronchial secretions.    #Severe COPD (FEV1 0.55, Z-4.42 4/2023) with acute exacerbation  #Chronic hypoxemic hypercapnic respiratory failure (4L O2, pVCO2 55-60 baseline)  Last exacerbation requiring steroids was 3 weeks ago.  Last exacerbation requiring hospitalization was 2/2023.  Current exacerbation likely due to her not using her secretion management therapies, no history of illness.  CT consolidation more concerning for malignancy, given prior work up and therapies.  She should remain on NIPPV, if she can tolerate it from a nausea/reflux standpoint.  I do worry that her exacerbations are driven a least in part by recurrent reflux symptoms.    #RLL adenocarcinoma s/p lobectomy (2/2016)  #RUL NSCLC s/p SBRT (1/2020)  Oncology note from 10/2023 reviewed.  Plan for follow up clinic visit today to determine next steps in the setting of enlarging masslike consolidation in RUL.  Only therapy that would be offered would be systemic, if needed.  EBUS from 7/19/2023 without positive lymph nodes, but scant cellularity.    Recommendations:    - Prolong current prednisone taper:     - 40 mg for 3 additional days then    - 30 mg daily for 3 days then    - 20 mg daily for 3 days then    - 10 mg daily for 3 days   - Continue Trelegy (LAMA/LABA/ICS)   - Restart secretion management:    - Vest therapy BID PRN    - Mucomyst neb  BID PRN   - Duonebs QID PRN   - Nightly NIPPV on hold due to nausea, reflux   - Continue roflumilast 500 mg daily   - COPD home action plan active: prednisone + doxycycline on hand for 5-days (3 refills ordered for the winter)   - RSV vaccine when feeling well   - RTC 3 months    Patient seen & discussed w/  Dr. Bond, who agrees with the above assessment and plan.    Kailee Moralez M.D.  Pulmonary and Critical Care Medicine Fellow  12/13/2023             Interval History / Subjective:   Jenn Aparicio is a 68 year old female with severe COPD (FEV1 0.55, Z-4.42 4/2023) c/b chronic hypoxic hypercapnic respiratory failure (4L chronic oxygen), RLL adeno s/p lobectomy (2016), RUL NSCLC s/p SBRT (1/2020), ongoing tobacco use, DM2, and HTN who established with pulmonary clinic 12/2020 after transferring her oncology care to [facility].  She was last seen 8/2023 after being hospitalized 1/1-1/4/2023, 2/3-2/9/2023, and 2/20-2/24/2023 for severe COPD exacerbations without any overt triggers.  She returns today for 3-month follow up.    Since her last visit, she followed up with oncology due to enlarging RUL masslike consolidation.  Dr. Leung was concerned for recurrence of cancer at the site of prior SBRT, but with negative EBUS was awaiting follow up CT which was done yesterday.  The patient is scheduled to follow up in oncology clinic this afternoon.    She was seen in GI clinic by a PA on 9/18/2023 and at that time had significant improvement in her dysphagia and GERD symptoms.  She is scheduled to follow up with GI again next week.  If her symptoms worsen, they are considering repeat esophageal dilation.    She reports doing well until about 3 weeks ago when she developed worsening dyspnea and increased sputum production.  She took her home action plan which helped some but did not totally resolve her symptoms.  She just finished the course about a week ago.  She denies fevers, sick contacts.  She wears a mask around her  apartment complex.    She reports symptoms of a yeast infection since starting her prednisone course.  She also reports poor appetite, increased fatigue, and sleepiness which she attributes to starting Ozempic.    She is using her Trelegy inhaler daily, her Duonebs twice a day, and is not sure that she has received her roflumilast.  She was using her vest and Mucomyst until about a month ago.  It was helping  a lot with her sputum mobilization.  She still has the equipment and materials at home.  She is not using her Bipap due to persistent nausea and reflux at night.          Social/Occupational/Exposures:     Patient is a off-and-on smoker w/ significant second hand exposure in her apartment building.  No known asbestos exposure.  No pets.  No bird exposure.  Significant mold reported in her apartment building.    Social History     Tobacco Use    Smoking status: Former     Packs/day: .25     Types: Cigarettes    Smokeless tobacco: Never    Tobacco comments:     01/02/2023 Patient using 14 mg patch, wants to have prescription for Nicotrol inhaler, took workbook   Substance Use Topics    Alcohol use: Not Currently    Drug use: No             Review of Symptoms:   10-point ROS reviewed, & found negative w/ exceptions noted in the HPI.          Past Medical History:     Past Medical History:   Diagnosis Date    Adenocarcinoma, lung (H)     Asthma     Ectopic pregnancy     Esophageal reflux     Pulmonary emphysema (H)     Very severe FEV1<30% predicted    Type II diabetes mellitus (H)        Past Surgical History:   Procedure Laterality Date    2/8/16                R thoracotomy, RLL lobectomy (Dr. Cunha). Adenocarcinoma, 1.1 cm, assoicated with atypical adenomatous hyperplasia Right 02/08/2016    R thoracotomy, RLL lobectomy (Dr. Cunha). Adenocarcinoma, 1.1 cm, assoicated with atypical adenomatous hyperplasia    BRONCHOSCOPY, WITH BIOPSY, ROBOT ASSISTED N/A 7/19/2023    Procedure: robot assisted Ion  BRONCHOSCOPY, WITH BIOPSY;  Surgeon: Patria Garcia MD;  Location: [facility] OR    ENDOBRONCHIAL ULTRASOUND FLEXIBLE N/A 7/19/2023    Procedure: Endobronchial ultrasound flexible;  Surgeon: Patria Garcia MD;  [facility]    ESOPHAGOSCOPY, GASTROSCOPY, DUODENOSCOPY (EGD), COMBINED N/A 8/16/2022    Procedure: ESOPHAGOGASTRODUODENOSCOPY (EGD);  Surgeon: Travis Briseno MD;  Location:  GI    ESOPHAGOSCOPY, GASTROSCOPY, DUODENOSCOPY (EGD), COMBINED N/A 8/8/2023    Procedure: ESOPHAGOGASTRODUODENOSCOPY, WITH BIOPSY;  Surgeon: Chao Rodrigues DO;  Location:  GI    ORTHOPEDIC SURGERY      PHACOEMULSIFICATION CLEAR CORNEA WITH STANDARD INTRAOCULAR LENS IMPLANT Left 8/24/2023    Procedure: LEFT EYE PHACOEMULSIFICATION, CATARACT, WITH INTRAOCULAR LENS IMPLANT;  Surgeon: Re Keita MD;  [place] [facility] [place]    PHACOEMULSIFICATION CLEAR CORNEA WITH STANDARD INTRAOCULAR LENS IMPLANT Right 9/5/2023    Procedure: RIGHT EYE PHACOEMULSIFICATION, CATARACT, WITH INTRAOCULAR LENS IMPLANT;  Surgeon: Re Keiat MD;  Location: [facility] OR            Allergies:     Allergies   Allergen Reactions    Bee Venom Anaphylaxis    Interferons Dermatitis    Penicillins Hives    Azithromycin Dizziness    Aspirin      325mg     Colon Care              Outpatient Medications:     acetylcysteine (MUCOMYST) 10 % nebulizer solution, Inhale 4 mLs into the lungs 4 times daily  albuterol (PROAIR HFA/PROVENTIL HFA/VENTOLIN HFA) 108 (90 Base) MCG/ACT inhaler, USE 1 OR 2 INHALATIONS EVERY 4 HOURS AS NEEDED (Patient taking differently: Inhale 1 puff into the lungs daily USE 1 OR 2 INHALATIONS EVERY 4 HOURS AS NEEDED)  albuterol (PROVENTIL) (2.5 MG/3ML) 0.083% neb solution, Take 1 vial (2.5 mg) by nebulization 4 times daily  Alpha Lipoic Acid 200 MG CAPS, Take 1 capsule by mouth daily (Patient taking differently: Take 1 capsule by mouth every morning)  amLODIPine (NORVASC) 10 MG tablet, Take 1 tablet (10 mg) by mouth daily  atorvastatin (LIPITOR) 10 MG  tablet, Take 1 tablet (10 mg) by mouth at bedtime  buPROPion (WELLBUTRIN XL) 300 MG 24 hr tablet, Take 1 tablet (300 mg) by mouth every morning  carboxymethylcellulose (REFRESH LIQUIGEL) 1 % ophthalmic solution, Apply 1 drop to eye 4 times daily  cetirizine (ZYRTEC) 10 MG tablet, Take 1 tablet (10 mg) by mouth every morning  Continuous Blood Gluc  (DEXCOM G7 ) JOSEPHINE, Use to read blood sugars as per 's instructions.  Continuous Blood Gluc Sensor (DEXCOM G7 SENSOR) MISC, Change every 10 days.  Continuous Blood Gluc Sensor (FREESTYLE GIOVANNI 2 SENSOR) [facility], 1 each every 14 days For use with Freestyle Giovanni 2  for continuous monitioring of blood glucose levels. Replace sensor every 14 days.  cyanocobalamin (VITAMIN B-12) 500 MCG tablet, Take 1 tablet (500 mcg) by mouth daily (Patient taking differently: Take 500 mcg by mouth every morning)  empagliflozin (JARDIANCE) 25 MG TABS tablet, Take 1 tablet (25 mg) by mouth daily  EPINEPHrine (ANY BX GENERIC EQUIV) 0.3 MG/0.3ML injection 2-pack, Inject 0.3 mLs (0.3 mg) into the muscle as needed for anaphylaxis (related to bee stings) May repeat one time in 5-15 minutes if response to initial dose is inadequate.  fluticasone (FLONASE) 50 MCG/ACT nasal spray, Spray 1 spray into both nostrils 2 times daily Use at night before bed  Fluticasone-Umeclidin-Vilanterol (TRELEGY ELLIPTA) 200-62.5-25 MCG/ACT oral inhaler, USE 1 INHALATION DAILY  GLUCOSAMINE-CHONDROITIN -400 MG tablet, TAKE 1 TABLET DAILY (Patient taking differently: Take 1 tablet by mouth every evening)  insulin pen needle (BD MARCIO U/F) 32G X 4 MM miscellaneous, Use 1 pen needle daily or as directed.  ipratropium - albuterol 0.5 mg/2.5 mg/3 mL (DUONEB) 0.5-2.5 (3) MG/3ML neb solution, Take 1 vial (3 mLs) by nebulization every 6 hours as needed for shortness of breath, wheezing or cough  nicotine (NICODERM CQ) 14 MG/24HR 24 hr patch, Place 1 patch onto the skin every 24  hours  olopatadine (PATADAY) 0.2 % ophthalmic solution, Place 0.05 mLs (1 drop) into both eyes daily  omega-3 acid ethyl esters (LOVAZA) 1 g capsule, Take 2 capsules (2 g) by mouth 2 times daily (Patient taking differently: Take 1 g by mouth 2 times daily Take 2 capsules (2 g) by mouth 2 times daily)  omeprazole (PRILOSEC) 40 MG DR capsule, Take 1 capsule (40 mg) by mouth 2 times daily for 180 days  pregabalin (LYRICA) 150 MG capsule, TAKE 1 CAPSULE(150 MG) BY MOUTH TWICE DAILY  semaglutide (OZEMPIC, 0.25 OR 0.5 MG/DOSE,) 2 MG/3ML pen, Inject 0.25 mg Subcutaneous once a week  STATIN NOT PRESCRIBED (INTENTIONAL), Please choose reason not prescribed from choices below.    No current facility-administered medications on file prior to visit.            Family History:     Family History   Problem Relation Age of Onset    Thyroid Disease Mother     Cerebrovascular Disease Mother     Hypertension Mother     Hypertension Father     Glaucoma Father     Cancer Sister     Lung Cancer Sister     Diabetes Brother     Cancer Brother     Diabetes Brother     Cancer Brother     Diabetes Brother     Deep Vein Thrombosis (DVT) Daughter     Depression Daughter     Alcohol/Drug Other         self    Diabetes Other         self    Thyroid Disease Other         self    Asthma Other         self    Macular Degeneration No family hx of     Anesthesia Reaction No family hx of                Physical Exam:   /73 (BP Location: Right arm, Patient Position: Sitting)   Pulse 99   SpO2 91%     General: adult female, using a walker, appears stated age  HENT: external ears without visible abnormalities, no rhinorrhea, no epistaxis, wearing a mask  Lungs: poor air movement in all lung fields, no wheezing, on NC 0-3L during appointment, no accessory muscle use  Heart: RRR, no murmurs  Extremities: trace bilateral pitting edema, bilateral clubbing, no cyanosis  Skin: no visible rashes, no mottling, scattered ecchymoses in various stages of  healing  Neurologic: moving all 4 extremities spontaneously, awake and alert  Psych: full affect, appropriate insight, appropriate judgment          Data:   Labs: notable labs in HPI above.    CO2 (4/17/23): 36 -> (11/27/23) 31  Alpha-1 Antitrypsin (2/22/2023): 149, normal M1M1 phenotype    Imaging and other diagnostic testing (all imaging studies reviewed by me)    CT chest 12/12/2023: RUL masslike consolidation appears larger and more dense compared to prior; diffuse emphysema stable though severe  CT chest 3/22/2023: RUL nodules stable compared to 3/1/2023; ROBERT mucus plugging    PFTs 12/2020:        PFTs 4/2023: FEV1 0.55 (-4.42), FVC 1.36 (-3.64), 0.40 -> severe obstruction, stable compared to 12/2020    6MWT 8/10/2022: 540 feet, 97% -> 90% on room air (previous walk in 12/2020 for 480 feet)    Transthoracic echocardiogram (9/2022): EF 55-60%, normal RV, cannot assess PASP, normal RA pressure

## 2023-12-13 NOTE — PROGRESS NOTES
[facility]    PATIENT NAME: Jenn Aparicio  MRN # [id number]   DATE OF VISIT: December 13, 2023  YOB: 1955     CANCER TYPE: Lung adenocarcinoma  STAGE: IA2 [id number] poorly diff adeno RLL, then [id number] IA2 suspected NSCLC RUL, medically inoperable     ECOG PS: 2 (COPD)    PD-L1: N/A  Lung panel: N/A  NGS: N/A    SUMMARY  12/24/15 PET/CT  1/13/15 CT lung bx. Adenocarcinoma  2/8/16 R thoracotomy, RLL lobectomy (Dr. Cunha). Adenocarcinoma, 1.1 cm, assoicated with atypical adenomatous hyperplasia  10/18/19 CTA for shortness of breath. New 1.3 cm RUL nodule  11/2019 PET/CT. 1.1 cm RUL hypermetabolic nodule (SUV 12.6)  12/26/19 Brain MRI negative for mets  1/14~1/28/20 SBRT to RUL nodule, 5000 cGy, every other day, 1000 cGy (Dr. Currie at [facility]). No bx due to O2 dependence and too much risk  8/19/20 First visit with me   11/29/21 CT chest. New 10 mm RUL nodule  1/11/22 CT chest. New 7.2 mm RUL nodule, unchanged RUL nodules  3/31/22 CT chest. New RUL nodule from Jan 2022 7-->10 mm, new 5 mm ROBERT nodule  5/20~5/24/22 [facility] for COPD exacerbation.   6/24/22 CT chest non contrast.   8/16/22 EGD. 3 cm hiatal hernia, o/w normal  8/31/22 CT chest. 2.5 x 1.3 cm R midlung subpleural nodularity (4:98) previously 2.3 x 1.1 cm, slightly increased solid component   10/5/22 PET/CT. 2 x 1.3 cm irregular opacity posterior RUL (SUV 2.46), 2.4 x 0.8 cm linear opacity (SUV 4.76); there was bronchiectasia on prior imaging. Stable 1.1 x 0.8 cm non FDG avid RUL opacity and unchanged 4 mm posterior RUL nodule. No adenopathy. Inferior right breast uptake (SUV 6.09) likely infectious or inflammatory.   2/3~2/9/23 [facility] for COPD exacerbation  2/4/23 CTA during hospitalization. No significant change in 2.3 x 1.5 cm spiculated nodule in the right upper lobe (series 7 image 94) with surrounding linear parenchymal opacities and subtle nodularity  2/20~2/24/23 [facility] for COPD exacerbation.   3/1/23 CT chest non contrast. 2.3 cm  posterior RUL irregular nodule unchanged from 12/1/22 however slightly increased soft tissue component compared to 10/5/22 PET. Stable ROBERT mucous plugging with micronodularity.  3/22/23 CT chest. Stable compared to 3/1/23  5/25/23 CT chest abd w/contrast. Unchanged 1.3 x 0.5 cm R apical nodule, unchnaged 2.4 x 1.3 cm R upper nodular consolidation. New nodular/masslike area of consolidation anteriorly and laterally measuring 3.3 x 1.4 cm, not present on CT 3/22/23. No adenopathy. Consider PET or close surveillance CT in 3 months  7/19/23 Bronch, EBUS (Dr. Garcia). 11L negative, scant cells. 4L unsatisfactory for eval. 8 negative, scant cellularity, 4R unsatisfactory, 11R negative, scan cellularity, RUL negative, showed acute inflammation, limited by scant cellularity.    ASSESSMENT AND PLAN  NSCLC, adeno, RUL: PET/CT in June worrisome for local recurrence at the prior SBRT site, but bx negative although limited by scant cellularity. Last CT looked worse but was in the setting of URI/pneumonia. Close interim follow up CT this time looks worse again. Plan for PET to look for different target to bx, see how FDG avidity has changed. Discussed anticipating recommendation of repeat bx attempt. Will run by IP whether could try bx under MAC or conscious sedation but anticipate they'll say too risky because of O2 dependent COPD.     COPD: Remains O2 dependent with exacerbations throughout the year, usually better in warmer months. Saw Pulm earlier today, note reviewed.    Deconditioning: Getting a little worse. Recommended PT consult last visit but will put on back burner for today    DM2: Seeing Dr. Shabazz. Recently started ozempic, causing nausea.     Dysphagia: Met with Dr. Briseno in GI 7/18/23, has had pretty extensive evaluation before. Next step is video swallow, possibly EGD if video swallow negative. No abnormalities in the head/neck are on PET 6/30/23, which is reassuring from a cancer perspective. Not discussed  today    Peripheral neuropathy: Not discussed today. Mostly driven by DM    30 minutes spent by me on the date of the encounter doing chart review, history and exam, documentation and further activities per the note     Zarina Leung MD  Associate Professor of Medicine  Hematology, Oncology and Transplantation      SUBJECTIVE  Jenn returns for close interim follow up.   Another bronchitis - increased secretions, green. No fever. Started pred + abx about 5 days ago or so.   Started ozempic - lots of nausea   O/w ok    PAST MEDICAL HISTORY  Lung adenocarcinoma  DM2 with neuropathy  HCV IA, undetectable in 2019, treated  COPD. O2 dependent since late 2018. PFT 11/26/19. FEV1 0.64 (30%), FVC 1.69 (63%), DLCOunc 9.8 (38%).  HTN  H/o ITP  H/o disk herniation  Ankle surgery  Median neuropathy R  H/o sessile colon polyps 2014, h/o adenomas before that. Colonoscopy 2/7/18 @ [facility]. Sessile serated adenoma x 1, tubular adenoma x 3  H/o chronic LBP  Dyslipidemia  Cataracts    CURRENT OUTPATIENT MEDICATIONS  Reviewed    ALLERGIES  Allergies   Allergen Reactions    Bee Venom Anaphylaxis    Interferons Dermatitis    Penicillins Hives    Azithromycin Dizziness    Aspirin      325mg     Colon Care       PHYSICAL EXAM  /77 (BP Location: Right arm, Patient Position: Sitting, Cuff Size: Adult Large)   Pulse 100   Temp 99  F (37.2  C) (Oral)   Resp 20   Wt 96 kg (211 lb 11.2 oz)   SpO2 92%   BMI 36.34 kg/m    GEN: NAD  HEENT: EOMI, no icterus, injection or pallor  EXT: no edema  NEURO: alert    LABORATORY AND IMAGING STUDIES    Labs 11/27/23 were independently reviewed and interpreted by me    CT Chest w/o Contrast  Narrative: EXAM: CT CHEST W/O CONTRAST, 12/12/2023    INDICATION: re-evaluate RUL increased consolidation on CT in Oct 2023.  RUL lung adeno s/p SBRT; Malignant neoplasm of lower lobe of right  lung (H).    COMPARISON: CT chest 10/26/2023.    TECHNIQUE: CT imaging obtained through the chest without  contrast.  Coronal and axial MIP reformatted images obtained.     FINDINGS:    Lungs:  Central tracheobronchial tree is patent. Postoperative changes of  right lower lobectomy. Irregular shaped masslike consolidative lesion  in the subpleural posterior right upper lobe, increased in size  compared to 10/26/2023. The masslike consolidative density measures  5.0 x 4.5 cm, previously 2.7 x 3.6 cm when measured similarly.  Previously characterized prominent spiculated component is less  conspicuous due to interval increased confluence of the consolidative  density. There is similar background of traction bronchiectasis within  the consolidation.  Unchanged chronic mucous plugging posterior right  upper lobe. Stable adjacent nodules (series 4 images 103 and 106) in  the left upper lobe, largest measuring 6 mm (series 4 image 103).  Stable scattered centrilobular peribronchovascular nodules in the left  upper lobe adjacent to the left major fissure. No new or enlarging  pulmonary nodules. Moderate upper lobe predominant centrilobular and  paraseptal emphysema. No pleural effusion or pneumothorax.     Chest:   Heart is normal size without pericardial effusion.  Moderate coronary  artery calcifications. Normal caliber thoracic aorta and main  pulmonary artery. Scattered atherosclerotic calcification of the  ascending aorta. Stable 10 mm right hilar lymph node (series 3 image  128). No pathologically enlarged thoracic lymph nodes. Small hiatal  hernia.    Upper Abdomen:   No acute abnormality. Bilateral small renal vascular calcifications.     Bones / Soft Tissues:   No suspicious lesions or acute abnormalities.  Impression: IMPRESSION:   1. Increased size of irregular spiculated masslike consolidative  lesion in the posterior right upper lobe compared to 10/26/2023.  Previously characterized prominent spiculated component is less  conspicuous due to interval increased confluence of the consolidative  lesion. Findings  concerning for possible disease progression at this  site. Consider further evaluation with PET/CT.  2. No new or enlarged thoracic lymphadenopathy  3. Unchanged clustered nodules and centrilobular peribronchial  vascular nodules in the left upper lobe, likely representing sequela  of prior infection.    I have personally reviewed the examination and initial interpretation  and I agree with the findings.    MARK BARRERA MD         SYSTEM ID:  [id number]     Imaging was personally reviewed and interpreted by me and compared with prior imaging going back to May 2023.

## 2023-12-13 NOTE — NURSING NOTE
Chief Complaint   Patient presents with    Follow Up     Return appt      Vitals were taken and medications were reconciled.     Eloise Duarte RMA  1:57 PM

## 2023-12-13 NOTE — LETTER
12/13/2023         RE: Jenn Aparicio  [address]  [place] [address]        Dear Colleague,    Thank you for referring your patient, Jenn Aparicio, to the [facility]. Please see a copy of my visit note below.       [facility]    PATIENT NAME: Jenn Aparicio  MRN # [id number]   DATE OF VISIT: December 13, 2023  YOB: 1955     CANCER TYPE: Lung adenocarcinoma  STAGE: IA2 [id number] poorly diff adeno RLL, then [id number] IA2 suspected NSCLC RUL, medically inoperable     ECOG PS: 2 (COPD)    PD-L1: N/A  Lung panel: N/A  NGS: N/A    SUMMARY  12/24/15 PET/CT  1/13/15 CT lung bx. Adenocarcinoma  2/8/16 R thoracotomy, RLL lobectomy (Dr. Cunha). Adenocarcinoma, 1.1 cm, assoicated with atypical adenomatous hyperplasia  10/18/19 CTA for shortness of breath. New 1.3 cm RUL nodule  11/2019 PET/CT. 1.1 cm RUL hypermetabolic nodule (SUV 12.6)  12/26/19 Brain MRI negative for mets  1/14~1/28/20 SBRT to RUL nodule, 5000 cGy, every other day, 1000 [place] [facility] [place] bx due to O2 dependence and too much risk  8/19/20 First visit with me   11/29/21 CT chest. New 10 mm RUL nodule  1/11/22 CT chest. New 7.2 mm RUL nodule, unchanged RUL nodules  3/31/22 CT chest. New RUL nodule from Jan 2022 7-->10 mm, new 5 mm ROBERT nodule  5/20~5/24/22 [facility] for COPD exacerbation.   6/24/22 CT chest non contrast.   8/16/22 EGD. 3 cm hiatal hernia, o/w normal  8/31/22 CT chest. 2.5 x 1.3 cm R midlung subpleural nodularity (4:98) previously 2.3 x 1.1 cm, slightly increased solid component   10/5/22 PET/CT. 2 x 1.3 cm irregular opacity posterior RUL (SUV 2.46), 2.4 x 0.8 cm linear opacity (SUV 4.76); there was bronchiectasia on prior imaging. Stable 1.1 x 0.8 cm non FDG avid RUL opacity and unchanged 4 mm posterior RUL nodule. No adenopathy. Inferior right breast uptake (SUV 6.09) likely infectious or inflammatory.   2/3~2/9/23 [facility] for COPD exacerbation  2/4/23 CTA during  hospitalization. No significant change in 2.3 x 1.5 cm spiculated nodule in the right upper lobe (series 7 image 94) with surrounding linear parenchymal opacities and subtle nodularity  2/20~2/24/23 [facility] for COPD exacerbation.   3/1/23 CT chest non contrast. 2.3 cm posterior RUL irregular nodule unchanged from 12/1/22 however slightly increased soft tissue component compared to 10/5/22 PET. Stable ROBERT mucous plugging with micronodularity.  3/22/23 CT chest. Stable compared to 3/1/23  5/25/23 CT chest abd w/contrast. Unchanged 1.3 x 0.5 cm R apical nodule, unchnaged 2.4 x 1.3 cm R upper nodular consolidation. New nodular/masslike area of consolidation anteriorly and laterally measuring 3.3 x 1.4 cm, not present on CT 3/22/23. No adenopathy. Consider PET or close surveillance CT in 3 months  7/19/23 Bronch, EBUS (Dr. Garcia). 11L negative, scant cells. 4L unsatisfactory for eval. 8 negative, scant cellularity, 4R unsatisfactory, 11R negative, scan cellularity, RUL negative, showed acute inflammation, limited by scant cellularity.    ASSESSMENT AND PLAN  NSCLC, adeno, RUL: PET/CT in June worrisome for local recurrence at the prior SBRT site, but bx negative although limited by scant cellularity. Last CT looked worse but was in the setting of URI/pneumonia. Close interim follow up CT this time looks worse again. Plan for PET to look for different target to bx, see how FDG avidity has changed. Discussed anticipating recommendation of repeat bx attempt. Will run by IP whether could try bx under MAC or conscious sedation but anticipate they'll say too risky because of O2 dependent COPD.     COPD: Remains O2 dependent with exacerbations throughout the year, usually better in warmer months. Saw Pulm earlier today, note reviewed.    Deconditioning: Getting a little worse. Recommended PT consult last visit but will put on back burner for today    DM2: Seeing Dr. Shabazz. Recently started ozempic, causing nausea.     Dysphagia:  Met with Dr. Briseno in GI 7/18/23, has had pretty extensive evaluation before. Next step is video swallow, possibly EGD if video swallow negative. No abnormalities in the head/neck are on PET 6/30/23, which is reassuring from a cancer perspective. Not discussed today    Peripheral neuropathy: Not discussed today. Mostly driven by DM    30 minutes spent by me on the date of the encounter doing chart review, history and exam, documentation and further activities per the note     Zarina Leung MD  Associate Professor of Medicine  Hematology, Oncology and Transplantation      SUBJECTIVE  Jenn returns for close interim follow up.   Another bronchitis - increased secretions, green. No fever. Started pred + abx about 5 days ago or so.   Started ozempic - lots of nausea   O/w ok    PAST MEDICAL HISTORY  Lung adenocarcinoma  DM2 with neuropathy  HCV IA, undetectable in 2019, treated  COPD. O2 dependent since late 2018. PFT 11/26/19. FEV1 0.64 (30%), FVC 1.69 (63%), DLCOunc 9.8 (38%).  HTN  H/o ITP  H/o disk herniation  Ankle surgery  Median neuropathy R  H/o sessile colon polyps 2014, h/o adenomas before that. [place] 2[place] [facility] [place] Sessile serated adenoma x 1, tubular adenoma x 3  H/o chronic LBP  Dyslipidemia  Cataracts    CURRENT OUTPATIENT MEDICATIONS  Reviewed    ALLERGIES  Allergies   Allergen Reactions    Bee Venom Anaphylaxis    Interferons Dermatitis    Penicillins Hives    Azithromycin Dizziness    Aspirin      325mg     Colon Care       PHYSICAL EXAM  /77 (BP Location: Right arm, Patient Position: Sitting, Cuff Size: Adult Large)   Pulse 100   Temp 99  F (37.2  C) (Oral)   Resp 20   Wt 96 kg (211 lb 11.2 oz)   SpO2 92%   BMI 36.34 kg/m    GEN: NAD  HEENT: EOMI, no icterus, injection or pallor  EXT: no edema  NEURO: alert    LABORATORY AND IMAGING STUDIES    Labs 11/27/23 were independently reviewed and interpreted by me    CT Chest w/o Contrast  Narrative: EXAM: CT CHEST W/O CONTRAST,  12/12/2023    INDICATION: re-evaluate RUL increased consolidation on CT in Oct 2023.  RUL lung adeno s/p SBRT; Malignant neoplasm of lower lobe of right  lung (H).    COMPARISON: CT chest 10/26/2023.    TECHNIQUE: CT imaging obtained through the chest without contrast.  Coronal and axial MIP reformatted images obtained.     FINDINGS:    Lungs:  Central tracheobronchial tree is patent. Postoperative changes of  right lower lobectomy. Irregular shaped masslike consolidative lesion  in the subpleural posterior right upper lobe, increased in size  compared to 10/26/2023. The masslike consolidative density measures  5.0 x 4.5 cm, previously 2.7 x 3.6 cm when measured similarly.  Previously characterized prominent spiculated component is less  conspicuous due to interval increased confluence of the consolidative  density. There is similar background of traction bronchiectasis within  the consolidation.  Unchanged chronic mucous plugging posterior right  upper lobe. Stable adjacent nodules (series 4 images 103 and 106) in  the left upper lobe, largest measuring 6 mm (series 4 image 103).  Stable scattered centrilobular peribronchovascular nodules in the left  upper lobe adjacent to the left major fissure. No new or enlarging  pulmonary nodules. Moderate upper lobe predominant centrilobular and  paraseptal emphysema. No pleural effusion or pneumothorax.     Chest:   Heart is normal size without pericardial effusion.  Moderate coronary  artery calcifications. Normal caliber thoracic aorta and main  pulmonary artery. Scattered atherosclerotic calcification of the  ascending aorta. Stable 10 mm right hilar lymph node (series 3 image  128). No pathologically enlarged thoracic lymph nodes. Small hiatal  hernia.    Upper Abdomen:   No acute abnormality. Bilateral small renal vascular calcifications.     Bones / Soft Tissues:   No suspicious lesions or acute abnormalities.  Impression: IMPRESSION:   1. Increased size of irregular  spiculated masslike consolidative  lesion in the posterior right upper lobe compared to 10/26/2023.  Previously characterized prominent spiculated component is less  conspicuous due to interval increased confluence of the consolidative  lesion. Findings concerning for possible disease progression at this  site. Consider further evaluation with PET/CT.  2. No new or enlarged thoracic lymphadenopathy  3. Unchanged clustered nodules and centrilobular peribronchial  vascular nodules in the left upper lobe, likely representing sequela  of prior infection.    I have personally reviewed the examination and initial interpretation  and I agree with the findings.    MARK BARRERA MD         SYSTEM ID:  [id number]     Imaging was personally reviewed and interpreted by me and compared with prior imaging going back to May 2023.        Zarina Leung MD

## 2023-12-13 NOTE — NURSING NOTE
"Oncology Rooming Note    December 13, 2023 3:22 PM   Jenn Aparicio is a 68 year old female who presents for:    Chief Complaint   Patient presents with    Oncology Clinic Visit     Primary lung adenocarcinoma      Initial Vitals: /77 (BP Location: Right arm, Patient Position: Sitting, Cuff Size: Adult Large)   Pulse 100   Temp 99  F (37.2  C) (Oral)   Resp 20   Wt 96 kg (211 lb 11.2 oz)   SpO2 92%   BMI 36.34 kg/m   Estimated body mass index is 36.34 kg/m  as calculated from the following:    Height as of 10/5/23: 1.626 m (5' 4\").    Weight as of this encounter: 96 kg (211 lb 11.2 oz). Body surface area is 2.08 meters squared.  Extreme Pain (9) Comment: Data Unavailable   No LMP recorded. Patient is postmenopausal.  Allergies reviewed: Yes  Medications reviewed: Yes    Medications: Medication refills not needed today.  Pharmacy name entered into EPIC:    [facility] - [place], [place] - [address] [id number] - [place], [place] - [address] AT [facility] [address]  [facility] - [facility], [place] - [address]    Clinical concerns: none      Radha Carson              "

## 2023-12-13 NOTE — LETTER
12/13/2023         RE: Jenn Aparicio  [address]  [place] [address]        Dear Colleague,    Thank you for referring your patient, Jenn Aparicio, to the [facility] FOR LUNG SCIENCE AND [facility] [place]. Please see a copy of my visit note below.    [facility]   Pulmonary Clinic  Consult Note 8/10/2022  Jenn Aparicio MRN: [id number]      Assessment & Plan     Jenn Aparicio is a 68 year old female with very severe COPD (FEV1 0.55, Z-4.42 4/2023), RLL adeno s/p lobectomy (2016), RUL NSCLC s/p SBRT (1/2020), ongoing tobacco use, DM2, and HTN who initially established with pulmonary clinic 12/2020 after transferring her oncology care to [facility].  She was last seen 8/2022.  Since that time, she was hospitalized 1/1-1/4/2023, 2/3-2/9/2023, and 2/20-2/24/2023 for severe COPD exacerbations without any overt triggers other than recurrent reflux and thick bronchial secretions.    #Severe COPD (FEV1 0.55, Z-4.42 4/2023) with acute exacerbation  #Chronic hypoxemic hypercapnic respiratory failure (4L O2, pVCO2 55-60 baseline)  Last exacerbation requiring steroids was 3 weeks ago.  Last exacerbation requiring hospitalization was 2/2023.  Current exacerbation likely due to her not using her secretion management therapies, no history of illness.  CT consolidation more concerning for malignancy, given prior work up and therapies.  She should remain on NIPPV, if she can tolerate it from a nausea/reflux standpoint.  I do worry that her exacerbations are driven a least in part by recurrent reflux symptoms.    #RLL adenocarcinoma s/p lobectomy (2/2016)  #RUL NSCLC s/p SBRT (1/2020)  Oncology note from 10/2023 reviewed.  Plan for follow up clinic visit today to determine next steps in the setting of enlarging masslike consolidation in RUL.  Only therapy that would be offered would be systemic, if needed.  EBUS from 7/19/2023 without positive lymph nodes, but scant  cellularity.    Recommendations:    - Prolong current prednisone taper:     - 40 mg for 3 additional days then    - 30 mg daily for 3 days then    - 20 mg daily for 3 days then    - 10 mg daily for 3 days   - Continue Trelegy (LAMA/LABA/ICS)   - Restart secretion management:    - Vest therapy BID PRN    - Mucomyst neb BID PRN   - Duonebs QID PRN   - Nightly NIPPV on hold due to nausea, reflux   - Continue roflumilast 500 mg daily   - COPD home action plan active: prednisone + doxycycline on hand for 5-days (3 refills ordered for the winter)   - RSV vaccine when feeling well   - RTC 3 months    Patient seen & discussed w/  Dr. Bond, who agrees with the above assessment and plan.    Kailee Moralez M.D.  Pulmonary and Critical Care Medicine Fellow  12/13/2023             Interval History / Subjective:   Jenn Aparicio is a 68 year old female with severe COPD (FEV1 0.55, Z-4.42 4/2023) c/b chronic hypoxic hypercapnic respiratory failure (4L chronic oxygen), RLL adeno s/p lobectomy (2016), RUL NSCLC s/p SBRT (1/2020), ongoing tobacco use, DM2, and HTN who established with pulmonary clinic 12/2020 after transferring her oncology care to [facility].  She was last seen 8/2023 after being hospitalized 1/1-1/4/2023, 2/3-2/9/2023, and 2/20-2/24/2023 for severe COPD exacerbations without any overt triggers.  She returns today for 3-month follow up.    Since her last visit, she followed up with oncology due to enlarging RUL masslike consolidation.  Dr. Leung was concerned for recurrence of cancer at the site of prior SBRT, but with negative EBUS was awaiting follow up CT which was done yesterday.  The patient is scheduled to follow up in oncology clinic this afternoon.    She was seen in GI clinic by a PA on 9/18/2023 and at that time had significant improvement in her dysphagia and GERD symptoms.  She is scheduled to follow up with GI again next week.  If her symptoms worsen, they are considering repeat esophageal  dilation.    She reports doing well until about 3 weeks ago when she developed worsening dyspnea and increased sputum production.  She took her home action plan which helped some but did not totally resolve her symptoms.  She just finished the course about a week ago.  She denies fevers, sick contacts.  She wears a mask around her apartment complex.    She reports symptoms of a yeast infection since starting her prednisone course.  She also reports poor appetite, increased fatigue, and sleepiness which she attributes to starting Ozempic.    She is using her Trelegy inhaler daily, her Duonebs twice a day, and is not sure that she has received her roflumilast.  She was using her vest and Mucomyst until about a month ago.  It was helping  a lot with her sputum mobilization.  She still has the equipment and materials at home.  She is not using her Bipap due to persistent nausea and reflux at night.          Social/Occupational/Exposures:     Patient is a off-and-on smoker w/ significant second hand exposure in her apartment building.  No known asbestos exposure.  No pets.  No bird exposure.  Significant mold reported in her apartment building.    Social History     Tobacco Use    Smoking status: Former     Packs/day: .25     Types: Cigarettes    Smokeless tobacco: Never    Tobacco comments:     01/02/2023 Patient using 14 mg patch, wants to have prescription for Nicotrol inhaler, took workbook   Substance Use Topics    Alcohol use: Not Currently    Drug use: No             Review of Symptoms:   10-point ROS reviewed, & found negative w/ exceptions noted in the HPI.          Past Medical History:     Past Medical History:   Diagnosis Date    Adenocarcinoma, lung (H)     Asthma     Ectopic pregnancy     Esophageal reflux     Pulmonary emphysema (H)     Very severe FEV1<30% predicted    Type II diabetes mellitus (H)        Past Surgical History:   Procedure Laterality Date    2/8/16                R thoracotomy, RLL  lobectomy (Dr. Cunha). Adenocarcinoma, 1.1 cm, assoicated with atypical adenomatous hyperplasia Right 02/08/2016    R thoracotomy, RLL lobectomy (Dr. Cunha). Adenocarcinoma, 1.1 cm, assoicated with atypical adenomatous hyperplasia    BRONCHOSCOPY, WITH BIOPSY, ROBOT ASSISTED N/A 7/19/2023    Procedure: robot assisted Ion BRONCHOSCOPY, WITH BIOPSY;  Surgeon: Patria Garcia MD;  Location: [facility] OR    ENDOBRONCHIAL ULTRASOUND FLEXIBLE N/A 7/19/2023    Procedure: Endobronchial ultrasound flexible;  Surgeon: Patria Garcia MD;  Location: [facility] OR    ESOPHAGOSCOPY, GASTROSCOPY, DUODENOSCOPY (EGD), COMBINED N/A 8/16/2022    Procedure: ESOPHAGOGASTRODUODENOSCOPY (EGD);  Surgeon: Travis Briseno MD;  Location:  GI    ESOPHAGOSCOPY, GASTROSCOPY, DUODENOSCOPY (EGD), COMBINED N/A 8/8/2023    Procedure: ESOPHAGOGASTRODUODENOSCOPY, WITH BIOPSY;  Surgeon: Chao Rodrigues DO;  Location:  GI    ORTHOPEDIC SURGERY      PHACOEMULSIFICATION CLEAR CORNEA WITH STANDARD INTRAOCULAR LENS IMPLANT Left 8/24/2023    Procedure: LEFT EYE PHACOEMULSIFICATION, CATARACT, WITH INTRAOCULAR LENS IMPLANT;  Surgeon: Re Keita MD;  Location: [facility] OR    PHACOEMULSIFICATION CLEAR CORNEA WITH STANDARD INTRAOCULAR LENS IMPLANT Right 9/5/2023    Procedure: RIGHT EYE PHACOEMULSIFICATION, CATARACT, WITH INTRAOCULAR LENS IMPLANT;  Surgeon: Re Keita MD;  Location: [facility] OR            Allergies:     Allergies   Allergen Reactions    Bee Venom Anaphylaxis    Interferons Dermatitis    Penicillins Hives    Azithromycin Dizziness    Aspirin      325mg     Colon Care              Outpatient Medications:     acetylcysteine (MUCOMYST) 10 % nebulizer solution, Inhale 4 mLs into the lungs 4 times daily  albuterol (PROAIR HFA/PROVENTIL HFA/VENTOLIN HFA) 108 (90 Base) MCG/ACT inhaler, USE 1 OR 2 INHALATIONS EVERY 4 HOURS AS NEEDED (Patient taking differently: Inhale 1 puff into the lungs daily USE 1 OR 2 INHALATIONS EVERY 4 HOURS AS NEEDED)  albuterol  (PROVENTIL) (2.5 MG/3ML) 0.083% neb solution, Take 1 vial (2.5 mg) by nebulization 4 times daily  Alpha Lipoic Acid 200 MG CAPS, Take 1 capsule by mouth daily (Patient taking differently: Take 1 capsule by mouth every morning)  amLODIPine (NORVASC) 10 MG tablet, Take 1 tablet (10 mg) by mouth daily  atorvastatin (LIPITOR) 10 MG tablet, Take 1 tablet (10 mg) by mouth at bedtime  buPROPion (WELLBUTRIN XL) 300 MG 24 hr tablet, Take 1 tablet (300 mg) by mouth every morning  carboxymethylcellulose (REFRESH LIQUIGEL) 1 % ophthalmic solution, Apply 1 drop to eye 4 times daily  cetirizine (ZYRTEC) 10 MG tablet, Take 1 tablet (10 mg) by mouth every morning  Continuous Blood Gluc  (DEXCOM G7 ) JOSEPHINE, Use to read blood sugars as per 's instructions.  Continuous Blood Gluc Sensor (DEXCOM G7 SENSOR) MISC, Change every 10 days.  Continuous Blood Gluc Sensor (FREESTYLE GIOVANNI 2 SENSOR) MISC, 1 each every 14 days For use with Freestyle Giovanni 2  for continuous monitioring of blood glucose levels. Replace sensor every 14 days.  cyanocobalamin (VITAMIN B-12) 500 MCG tablet, Take 1 tablet (500 mcg) by mouth daily (Patient taking differently: Take 500 mcg by mouth every morning)  empagliflozin (JARDIANCE) 25 MG TABS tablet, Take 1 tablet (25 mg) by mouth daily  EPINEPHrine (ANY BX GENERIC EQUIV) 0.3 MG/0.3ML injection 2-pack, Inject 0.3 mLs (0.3 mg) into the muscle as needed for anaphylaxis (related to bee stings) May repeat one time in 5-15 minutes if response to initial dose is inadequate.  fluticasone (FLONASE) 50 MCG/ACT nasal spray, Spray 1 spray into both nostrils 2 times daily Use at night before bed  Fluticasone-Umeclidin-Vilanterol (TRELEGY ELLIPTA) 200-62.5-25 MCG/ACT oral inhaler, USE 1 INHALATION DAILY  GLUCOSAMINE-CHONDROITIN -400 MG tablet, TAKE 1 TABLET DAILY (Patient taking differently: Take 1 tablet by mouth every evening)  insulin pen needle (BD MARCIO U/F) 32G X 4 MM  miscellaneous, Use 1 pen needle daily or as directed.  ipratropium - albuterol 0.5 mg/2.5 mg/3 mL (DUONEB) 0.5-2.5 (3) MG/3ML neb solution, Take 1 vial (3 mLs) by nebulization every 6 hours as needed for shortness of breath, wheezing or cough  nicotine (NICODERM CQ) 14 MG/24HR 24 hr patch, Place 1 patch onto the skin every 24 hours  olopatadine (PATADAY) 0.2 % ophthalmic solution, Place 0.05 mLs (1 drop) into both eyes daily  omega-3 acid ethyl esters (LOVAZA) 1 g capsule, Take 2 capsules (2 g) by mouth 2 times daily (Patient taking differently: Take 1 g by mouth 2 times daily Take 2 capsules (2 g) by mouth 2 times daily)  omeprazole (PRILOSEC) 40 MG DR capsule, Take 1 capsule (40 mg) by mouth 2 times daily for 180 days  pregabalin (LYRICA) 150 MG capsule, TAKE 1 CAPSULE(150 MG) BY MOUTH TWICE DAILY  semaglutide (OZEMPIC, 0.25 OR 0.5 MG/DOSE,) 2 MG/3ML pen, Inject 0.25 mg Subcutaneous once a week  STATIN NOT PRESCRIBED (INTENTIONAL), Please choose reason not prescribed from choices below.    No current facility-administered medications on file prior to visit.            Family History:     Family History   Problem Relation Age of Onset    Thyroid Disease Mother     Cerebrovascular Disease Mother     Hypertension Mother     Hypertension Father     Glaucoma Father     Cancer Sister     Lung Cancer Sister     Diabetes Brother     Cancer Brother     Diabetes Brother     Cancer Brother     Diabetes Brother     Deep Vein Thrombosis (DVT) Daughter     Depression Daughter     Alcohol/Drug Other         self    Diabetes Other         self    Thyroid Disease Other         self    Asthma Other         self    Macular Degeneration No family hx of     Anesthesia Reaction No family hx of                Physical Exam:   /73 (BP Location: Right arm, Patient Position: Sitting)   Pulse 99   SpO2 91%     General: adult female, using a walker, appears stated age  HENT: external ears without visible abnormalities, no rhinorrhea,  no epistaxis, wearing a mask  Lungs: poor air movement in all lung fields, no wheezing, on NC 0-3L during appointment, no accessory muscle use  Heart: RRR, no murmurs  Extremities: trace bilateral pitting edema, bilateral clubbing, no cyanosis  Skin: no visible rashes, no mottling, scattered ecchymoses in various stages of healing  Neurologic: moving all 4 extremities spontaneously, awake and alert  Psych: full affect, appropriate insight, appropriate judgment          Data:   Labs: notable labs in HPI above.    CO2 (4/17/23): 36 -> (11/27/23) 31  Alpha-1 Antitrypsin (2/22/2023): 149, normal M1M1 phenotype    Imaging and other diagnostic testing (all imaging studies reviewed by me)    CT chest 12/12/2023: RUL masslike consolidation appears larger and more dense compared to prior; diffuse emphysema stable though severe  CT chest 3/22/2023: RUL nodules stable compared to 3/1/2023; ROBERT mucus plugging    PFTs 12/2020:        PFTs 4/2023: FEV1 0.55 (-4.42), FVC 1.36 (-3.64), 0.40 -> severe obstruction, stable compared to 12/2020    6MWT 8/10/2022: 540 feet, 97% -> 90% on room air (previous walk in 12/2020 for 480 feet)    Transthoracic echocardiogram (9/2022): EF 55-60%, normal RV, cannot assess PASP, normal RA pressure    Attestation signed by Kathleen Bond MD at 12/16/2023  8:31 PM:  Physician Attestation   I, Kathleen Bond MD, saw this patient with the fellow and agree with the fellow's findings and plan of care as documented in the note.      I personally reviewed vital signs, medications, relevant images, and labs    The patient was seen and examined with the fellow physician.  We have discussed the patient in detail and I agree with the findings, assessment, and plan as documented when this note was cosigned on this day.     Kathleen Bond MD  Date of Service (when I saw the patient): 12/13/23

## 2023-12-13 NOTE — PATIENT INSTRUCTIONS
I sent a prescription for more prednisone (steroids).  You will take a high dose for the first 3 days and then smaller doses every 3 days until it is gone.  I will send in a new refill of prednisone and the antibiotic to have at home if you need it again.  Let me know if [facility] pharmacy doesn't give you the roflimulast.  I sent in the pill for the yeast infection.    I will see you back in about 3 months, but call if you need me sooner!  Kailee Moralez MD

## 2023-12-14 ENCOUNTER — TELEPHONE (OUTPATIENT)
Dept: PULMONOLOGY | Facility: CLINIC | Age: 68
End: 2023-12-14
Payer: COMMERCIAL

## 2023-12-14 NOTE — TELEPHONE ENCOUNTER
Patient Contacted for the patient to call back and schedule the following:    Appointment type: TONGULM  Provider: ELIJAH  Return date: 03/20/24  Specialty phone number: [phone]  Additional appointment(s) needed: NA  Additonal Notes: NA

## 2023-12-15 NOTE — TUMOR CONFERENCE
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 Enlarging RLL mass at the site of prior SBRT. PET/CT in June 2023 showed FDG avidity. Bronch/EBUS 7/19/23. Path: 11L negative, scant cells. 4L unsatisfactory for eval. 8 negative, scant cellularity, 4R unsatisfactory, 11R negative, scan cellularity, RUL negative, showed acute inflammation, limited by scant cellularity. Mass now growing even more    Specific Question:  Bx approach    Pertinent Histology:  lung adenocarcinoma    The patient's case was presented at the multidisciplinary conference for the above noted reason.  There was a consensus recommendation for the following actions:           Case Lead:  Zarina Leung    Documentation / Disclaimer Cancer Tumor Board Note  Cancer tumor board recommendations do not override what is determined to be reasonable care and treatment, which is dependent on the circumstances of a patient's case; the patient's medical, social, and personal concerns; and the clinical judgment of the oncologist [physician].

## 2023-12-17 NOTE — PROGRESS NOTES
Gastroenterology Visit for: Jenn Aparicio 1955   MRN: [id number]     Reason for Visit:  chief complaint    Referred by: Self  / No address on file  Patient Care Team:  Mitzi Nettles APRN CNP as PCP - General (Nurse Practitioner)  Zarina Leung MD as MD (Internal Medicine-Hematology & Oncology)  Latesha Christianson, RN as Clinic Care Coordinator  Zarina Leung MD as Assigned Cancer Care Provider  Mitzi Nettles APRN CNP as Assigned PCP  Lynda Spann MD as Fellow (Pulmonary Disease)  Earl Mcgrath [facility] as Pharmacist (Pharmacist Clinician- Clinical Pharmacy Specialist)  Travis Briseno MD as Assigned Gastroenterology Provider  La Nena Shabazz MD as MD (Endocrinology, Diabetes, and Metabolism)  Nithya Narvaez RN as Diabetes Educator (Diabetes Education)  Nithya Narvaez RN as Diabetes Educator (Diabetes Education)  La Nena Shabazz MD as Assigned Endocrinology Provider  Kush Santamaria MD as MD (Internal Medicine)  Kailee Moralez MD as Resident (Student in organized health care education/training program)  Gab Walden MD as Hospitalist (Internal Medicine)  Re Keita MD as MD (Ophthalmology)  Samy Prasad MARC as Assigned MTM Pharmacist  Earl Ureña MD as Assigned Pulmonology Provider  Re Keita MD as Assigned Surgical Provider  Ana Davey, RN as Specialty Care Coordinator (Hematology & Oncology)    History of Present Illness:   Jenn Aparicio is a 68 year old female with significant past medical history pertinent for COPD on home O2, adenocarcinoma of the lung (2015), acute on chronic hypoxic respiratory failure, DM type II complicated by diabetic neuropathy, who is presenting as a follow up patient with a chief complaint of GERD/abdominal bloating/distension.    Interval History December 18, 2023:    Seen by oncology 12/13/2023 with concerns for reoccurrence of disease with plan for repeat PET/possible targeted biopsies.      Since the increase in  Ozempic has has increase in regurgitation and dysgeusia despite use of Omeprazole 40 mg BID.  Prior to escalation reflux/regurgitation was well controlled.      Additionally she has had an increase in gas/distention and generalized abdominal cramping also with the increase of the Ozempic. The symptoms have no association with defecation/oral intake.      Associated with gait imbalance, feeling of instability, leg cramping and fatigue.      Denies weight loss, nausea, emesis, dysphagia, odynophagia, bloating/fullness, post prandial bloating, diarrhea, constipation, nocturnal stooling, incontinence, melena, hematochezia and BRBR.     -----------------------------------------------------------------------------------------------------------------------------------------------------------------------------------------------------------------------------------    Interval History September 18, 2023:    CBC CMP 5/2023 WNL.     Since the endoscopy 8/8/2023 she has had no problems with dysphagia.     Currently she is experiencing heartburn/regurgitation daily after each meal. Currently she is taking Omeprazole 20mg twice daily 30 - 60 minutes prior to meals.  Noting lemon, chocolate are a dietary trigger for reflux.     Symptoms of reflux have worsened since initiation of Ozempic.     Denies weight loss, nausea, emesis, dysphagia, odynophagia, abdominal pain, diarrhea, constipation (< 3 stools per week), nocturnal stooling, incontinence of feces, melena, hematochezia and BRBR.     Brother possibly had pancreatic cancer? Patient is not certain to the details    No additional family history or GI related malignancy (esophageal, gastric, pancreatic, liver or colon).     ------------------------------------------------------------------------------------------------------------------------------------------------------------------------------------------------------------------------------------------    7/2023 Dr. BRITTANY Briseno  HPI    History of Present Illness:   This is a patient evidence following for the last few years with severe acid reflux associate with emphysema also dysphagia which has been difficult to pin down she had initially an esophagram which are demonstrated some tertiary contractions and EGD follow-up of this which did not demonstrate any abnormalities with exception of a hiatal hernia.  The patient went on to have an esophageal manometry recently which is read as normal.  The patient's continue to have difficulty swallowing sometimes regurgitating food back up is not sure from where when I talk about her chest or areas that she feels sensations it is difficult for her to answer that.  She sometimes feels like she has a sore throat and cannot swallow.    Esophageal Questionnaire(s)    BEDQ Questionnaire      6/28/2023     2:12 PM   BEDQ Questionnaire: How Often Have You Had the Following?   Trouble eating solid food (meat, bread, vegetables) 4   Trouble eating soft foods (yogurt, jello, pudding) 0   Trouble swallowing liquids 4   Pain while swallowing 0   Coughing or choking while swallowing foods or liquids 2   Total Score: 10         6/28/2023     2:12 PM   BEDQ Questionnaire: Discomfort/Pain Ratings   Eating solid food (meat, bread, vegetables) 0   Eating soft foods (yogurt, jello, pudding) 0   Drinking liquid 0   Total Score: 0       Eckardt Questionnaire      6/28/2023     2:12 PM   Eckardt Questionnaire   Dysphagia 2   Regurgitation 3   Retrosternal Pain 0   Weight Loss (kg) 0   Total Score:  5       Promis 10 Questionnaire       No data to display                STUDIES & PROCEDURES:    EGD:     8/8/2023 Dr. Rodrigues  Findings:       There was pooling of thin secretions in the posterior        oropharynx/hypopharynx and in the vallecula. This was suctioned by the        anesthesia team repeatedly throughout the procedure        The examined esophagus was normal.        No endoscopic abnormality was evident in the  esophagus to explain the        patient's complaint of dysphagia. It was decided, however, to proceed        with dilation at the cricopharyngeus. A TTS dilator was passed through        the scope. Dilation with 11-12-13.5-15-16mm dilator was performed. The        dilation site was examined and showed no change. Estimated blood loss:        none.        The Z-line was regular and was found 38 cm from the incisors.        A 2 cm hiatal hernia was found. The proximal extent of the gastric folds        (end of tubular esophagus) was 38 cm from the incisors. The hiatal        narrowing was 40 cm from the incisors.        The gastroesophageal flap valve was visualized endoscopically and        classified as Hill Grade III (minimal fold, loose to endoscope, hiatal        hernia likely).        Patchy moderately erythematous mucosa without bleeding was found in the        gastric body and in the gastric antrum. Biopsies were taken with a cold        forceps for Helicobacter pylori testing. Verification of patient        identification for the specimen was done. Estimated blood loss: none.        The duodenal bulb, first portion of the duodenum, second portion of the        duodenum and examined duodenum were normal.                                                                                    Impression:   - Normal esophagus.                          - No endoscopic esophageal abnormality to explain                          patient's dysphagia. Esophagus dilated. Dilated.                          - Z-line regular, 38 cm from the incisors.                          - 2 cm hiatal hernia.                          - Gastroesophageal flap valve classified as Hill Grade                          III (minimal fold, loose to endoscope, hiatal hernia                          likely).                          - Erythematous mucosa in the gastric body and antrum.                          Biopsied.                          - Normal  duodenal bulb, first portion of the duodenum,                          second portion of the duodenum and examined duodenum.     Final Diagnosis   A. STOMACH, BIOPSY:  - Gastric mucosa with mild chronic inactive gastritis  - Negative for H. pylori-like organisms on routine staining  - Negative for intestinal metaplasia or dysplasia        8/16/2022   Findings:       The examined esophagus was normal. Normal secondary peristalsis        observered, without any tertiary contractions.        Esophagogastric landmarks were identified: the Z-line was found at 37        cm, the gastroesophageal junction was found at 37 cm and the site of        hiatal narrowing was found at 37 cm from the incisors.        A 3 cm hiatal hernia was present.        No other significant abnormalities were identified in a careful        examination of the stomach.        The cardia and gastric fundus were normal on retroflexion.        The in the duodenum was normal.                                                                                    Impression:               - Normal esophagus.                             - Esophagogastric landmarks identified.                             - 3 cm hiatal hernia. Otherwise normal stomach.                             - Normal duodenum.                             - No specimens collected.     Colonoscopy:    [facility]    Findings:   Three sessile polyps were found in the sigmoid colon, descending colon   and appendiceal orifice. The polyps were 3 to 4 mm in size. These polyps   were removed with a cold snare. Resection and retrieval were complete.   Four sessile polyps were found in the transverse colon and cecum. The   polyps were 1 to 2 mm in size. These polyps were removed with a cold   biopsy forceps. Resection and retrieval were complete.   Multiple small and large-mouthed diverticula were found in the sigmoid   colon, descending colon, splenic flexure, hepatic flexure, ascending  "  colon and cecum.     Impression: - Three 3 to 4 mm polyps in the sigmoid colon, in the   descending colon and at the appendiceal orifice, removed   with a cold snare. Resected and retrieved.   - Four 1 to 2 mm polyps in the transverse colon and in the   cecum, removed with a cold biopsy forceps. Resected and   retrieved.   - Diverticulosis in the sigmoid colon, in the descending   colon, at the splenic flexure, at the hepatic flexure, in   the ascending colon and in the cecum.   Recommendation:    - Discharge patient to home.   - Return to referring physician.   - Repeat colonoscopy in 3 years for surveillance.      Final Diagnosis:   A.  Colon, cecum and appendiceal, polyp, biopsy -   Tubular adenoma, multiple fragments.   B.  Colon, transverse, polyp, biopsy -  Tubular adenoma.   C.  Colon, descending and sigmoid, polyp, biopsy -    Tubular adenoma, multiple fragments.  Sessile serrated adenoma.     EndoFLIP directed at the UES or LES (8cm (EF-325) balloon length or 16cm (EF-322) balloon length):   Date:  8cm balloon  Balloon inflation Balloon pressure CSA (mm^2) DI (mm^2/mmHg) Dmin (mm) Compliance   20 (ladmark ID)        30        40        50           16cm balloon  Balloon inflation Balloon pressure CSA (mm^2) DI (mm^2/mmHg) Dmin (mm) Compliance   30 (ladmark ID)        40        50        60        70           High Resolution Manometry:    6/28/2023   Impression    Interpretation / Findings  \"The baseline tone of the lower esophageal sphincter was normotensive. The lower esophageal sphincter was able to relax appropriately as measured by the IRP: supine median 12.3 mm Hg, upright median 10.4 mm Hg. The EGJ morphology was type II. There was peristalsis visible. The DCI, DL, and contractile pattern were/were not within normal limits. There were 10/10 swallows with elevated intrabolus pressure and compartmentalized pressurization. Rapid water swallows were performed with normal augmentation. Rapid Drink " "Challenge does not indicate an elevated IRP. Supine liquid swallows were performed. Upright liquid swallows were performed. Bolus transit as measured by impedance was complete in 10/10 swallows. This is most consistent with absence of major disorder of esophageal peristalsis. Upper esophageal sphincter pressure somewhat elevated but clinical significant not clear.      Wording of procedure description and interpretation was adapted from [facility] Weekly Motility Conference (2018).\"       Impressions  There was no evidence of major disorder of esophageal peristalsis per [place] 4.0 classification. Consider work up for alternate causes of dysphagia including speech path study with modified barium swallow if not done.       PH/Impedance:     Echever[place]:    CT:    5/25/2023 CT AP W Contrast   IMPRESSION:  1.  Unchanged right upper lobe nodular consolidation. However, there is new nodular/masslike area of consolidation anteriorly and laterally. While this may reflect focal pneumonitis, neoplastic progression would be difficult to exclude. Consider further   evaluation with PET/CT or close imaging surveillance with repeat CT in 3 months.  2.  Unchanged scarlike right apical nodule, which is of doubtful clinical significance.  3.  Moderate emphysema.  4.  No new lymphadenopathy.    Esophagram:    5/20/2022 Esophagram   FINDINGS:   The esophagus demonstrates tertiary contractions suggestive of  dysmotility. There is no evidence of intrinsic or extrinsic mass or  polyp. No constricting or obstructing lesion is identified. There is  no mucosal ulceration. Mucosal folds are normal in appearance. Small  hiatal hernia. Spontaneous gastroesophageal reflux is visualized at  the level of the clavicle in the RPO position with head elevated  approximately 30 degrees.                                                                      IMPRESSION: Small hiatal hernia with spontaneous " gastroesophageal  reflux up to the level of the clavicle is visualized. Tertiary  contractions suggestive of esophageal dysmotility.      FL VSS:     GES:    U/S:     XRAY:    Other:       Prior medical records were reviewed including, but not limited to, notes from referring providers, lab work, radiographic tests, and other diagnostic tests. Pertinent results were summarized above.     History     Past Medical History:   Diagnosis Date    Adenocarcinoma, lung (H)     Asthma     Ectopic pregnancy     Esophageal reflux     Pulmonary emphysema (H)     Very severe FEV1<30% predicted    Type II diabetes mellitus (H)        Past Surgical History:   Procedure Laterality Date    2/8/16                R thoracotomy, RLL lobectomy (Dr. Cunha). Adenocarcinoma, 1.1 cm, assoicated with atypical adenomatous hyperplasia Right 02/08/2016    R thoracotomy, RLL lobectomy (Dr. Cunha). Adenocarcinoma, 1.1 cm, assoicated with atypical adenomatous hyperplasia    BRONCHOSCOPY, WITH BIOPSY, ROBOT ASSISTED N/A 7/19/2023    Procedure: robot assisted Ion BRONCHOSCOPY, WITH BIOPSY;  Surgeon: Patria Garcia MD;  Location: [facility] OR    ENDOBRONCHIAL ULTRASOUND FLEXIBLE N/A 7/19/2023    Procedure: Endobronchial ultrasound flexible;  Surgeon: Patria Garcia MD;  Location:  OR    ESOPHAGOSCOPY, GASTROSCOPY, DUODENOSCOPY (EGD), COMBINED N/A 8/16/2022    Procedure: ESOPHAGOGASTRODUODENOSCOPY (EGD);  Surgeon: Travis Briseno MD;  Location:  GI    ESOPHAGOSCOPY, GASTROSCOPY, DUODENOSCOPY (EGD), COMBINED N/A 8/8/2023    Procedure: ESOPHAGOGASTRODUODENOSCOPY, WITH BIOPSY;  Surgeon: Chao Rodrigues DO;  Location:  GI    ORTHOPEDIC SURGERY      PHACOEMULSIFICATION CLEAR CORNEA WITH STANDARD INTRAOCULAR LENS IMPLANT Left 8/24/2023    Procedure: LEFT EYE PHACOEMULSIFICATION, CATARACT, WITH INTRAOCULAR LENS IMPLANT;  Surgeon: Re Keita MD;  Location: [facility] OR    PHACOEMULSIFICATION CLEAR CORNEA WITH STANDARD INTRAOCULAR LENS IMPLANT Right  9/5/2023    Procedure: RIGHT EYE PHACOEMULSIFICATION, CATARACT, WITH INTRAOCULAR LENS IMPLANT;  Surgeon: Re Keita MD;  Location: [facility] OR       Social History     Socioeconomic History    Marital status: Single     Spouse name: Not on file    Number of children: Not on file    Years of education: Not on file    Highest education level: Not on file   Occupational History    Not on file   Tobacco Use    Smoking status: Former     Packs/day: .25     Types: Cigarettes    Smokeless tobacco: Never    Tobacco comments:     01/02/2023 Patient using 14 mg patch, wants to have prescription for Nicotrol inhaler, took workbook   Substance and Sexual Activity    Alcohol use: Not Currently    Drug use: No    Sexual activity: Not Currently     Partners: Male   Other Topics Concern    Not on file   Social History Narrative    Ms. Aparicio lives in an apartment complex by herself as of May 2022. She has frequent smoke exposure from neighbors even when she is not smoking herself.      Social Determinants of Health     Financial Resource Strain: Not on file   Food Insecurity: Not on file   Transportation Needs: Not on file   Physical Activity: Not on file   Stress: Not on file   Social Connections: Not on file   Interpersonal Safety: Not At Risk (3/1/2023)    Humiliation, Afraid, Rape, and Kick questionnaire     Fear of Current or Ex-Partner: No     Emotionally Abused: No     Physically Abused: No     Sexually Abused: No   Housing Stability: Not on file       Family History   Problem Relation Age of Onset    Thyroid Disease Mother     Cerebrovascular Disease Mother     Hypertension Mother     Hypertension Father     Glaucoma Father     Cancer Sister     Lung Cancer Sister     Diabetes Brother     Cancer Brother     Diabetes Brother     Cancer Brother     Diabetes Brother     Deep Vein Thrombosis (DVT) Daughter     Depression Daughter     Alcohol/Drug Other         self    Diabetes Other         self    Thyroid Disease Other          self    Asthma Other         self    Macular Degeneration No family hx of     Anesthesia Reaction No family hx of      Family history reviewed and edited as appropriate    Medications and Allergies:     Outpatient Encounter Medications as of 12/18/2023   Medication Sig Dispense Refill    acetylcysteine (MUCOMYST) 10 % nebulizer solution Inhale 4 mLs into the lungs 4 times daily 480 mL 0    albuterol (PROAIR HFA/PROVENTIL HFA/VENTOLIN HFA) 108 (90 Base) MCG/ACT inhaler USE 1 OR 2 INHALATIONS EVERY 4 HOURS AS NEEDED (Patient taking differently: Inhale 1 puff into the lungs daily USE 1 OR 2 INHALATIONS EVERY 4 HOURS AS NEEDED) 17 g 3    albuterol (PROVENTIL) (2.5 MG/3ML) 0.083% neb solution Take 1 vial (2.5 mg) by nebulization 4 times daily 360 mL 0    Alpha Lipoic Acid 200 MG CAPS Take 1 capsule by mouth daily (Patient taking differently: Take 1 capsule by mouth every morning) 90 capsule 3    amLODIPine (NORVASC) 10 MG tablet Take 1 tablet (10 mg) by mouth daily 90 tablet 3    atorvastatin (LIPITOR) 10 MG tablet Take 1 tablet (10 mg) by mouth at bedtime 30 tablet 5    buPROPion (WELLBUTRIN XL) 300 MG 24 hr tablet Take 1 tablet (300 mg) by mouth every morning 90 tablet 3    carboxymethylcellulose (REFRESH LIQUIGEL) 1 % ophthalmic solution Apply 1 drop to eye 4 times daily 15 mL 4    cetirizine (ZYRTEC) 10 MG tablet Take 1 tablet (10 mg) by mouth every morning 30 tablet 11    Continuous Blood Gluc  (DEXCOM G7 ) JOSEPHINE Use to read blood sugars as per 's instructions. 1 each 0    Continuous Blood Gluc Sensor (DEXCOM G7 SENSOR) MISC Change every 10 days. 3 each 5    Continuous Blood Gluc Sensor (FREESTYLE GIOVANNI 2 SENSOR) MISC 1 each every 14 days For use with Freestyle Giovanni 2  for continuous monitioring of blood glucose levels. Replace sensor every 14 days. 2 each 11    cyanocobalamin (VITAMIN B-12) 500 MCG tablet Take 1 tablet (500 mcg) by mouth daily (Patient taking differently: Take  500 mcg by mouth every morning) 90 tablet 1    doxycycline hyclate (VIBRA-TABS) 100 MG tablet Take 1 tablet (100 mg) by mouth 2 times daily for 5 days For COPD flare 10 tablet 3    empagliflozin (JARDIANCE) 25 MG TABS tablet Take 1 tablet (25 mg) by mouth daily 90 tablet 0    EPINEPHrine (ANY BX GENERIC EQUIV) 0.3 MG/0.3ML injection 2-pack Inject 0.3 mLs (0.3 mg) into the muscle as needed for anaphylaxis (related to bee stings) May repeat one time in 5-15 minutes if response to initial dose is inadequate. 2 each 1    fluticasone (FLONASE) 50 MCG/ACT nasal spray Spray 1 spray into both nostrils 2 times daily Use at night before bed 15.8 mL 3    Fluticasone-Umeclidin-Vilanterol (TRELEGY ELLIPTA) 200-62.5-25 MCG/ACT oral inhaler USE 1 INHALATION DAILY 28 each 5    GLUCOSAMINE-CHONDROITIN -400 MG tablet TAKE 1 TABLET DAILY (Patient taking differently: Take 1 tablet by mouth every evening) 90 tablet 3    insulin pen needle (BD MARCIO U/F) 32G X 4 MM miscellaneous Use 1 pen needle daily or as directed. 100 each 1    ipratropium - albuterol 0.5 mg/2.5 mg/3 mL (DUONEB) 0.5-2.5 (3) MG/3ML neb solution Take 1 vial (3 mLs) by nebulization every 6 hours as needed for shortness of breath, wheezing or cough 90 mL 1    nicotine (NICODERM CQ) 14 MG/24HR 24 hr patch Place 1 patch onto the skin every 24 hours      olopatadine (PATADAY) 0.2 % ophthalmic solution Place 0.05 mLs (1 drop) into both eyes daily 2.5 mL 11    omega-3 acid ethyl esters (LOVAZA) 1 g capsule Take 2 capsules (2 g) by mouth 2 times daily (Patient taking differently: Take 1 g by mouth 2 times daily Take 2 capsules (2 g) by mouth 2 times daily) 360 capsule 3    omeprazole (PRILOSEC) 40 MG DR capsule Take 1 capsule (40 mg) by mouth 2 times daily for 180 days 180 capsule 1    predniSONE (DELTASONE) 20 MG tablet Take 2 tablets (40 mg) by mouth daily for 3 days, THEN 1.5 tablets (30 mg) daily for 3 days, THEN 1 tablet (20 mg) daily for 3 days, THEN 0.5 tablets (10  mg) daily for 3 days. 15 tablet 0    predniSONE (DELTASONE) 20 MG tablet Take 2 tablets (40 mg) by mouth daily for 5 days For COPD flares 10 tablet 3    pregabalin (LYRICA) 150 MG capsule TAKE 1 CAPSULE(150 MG) BY MOUTH TWICE DAILY 270 capsule 1    roflumilast (DALIRESP) 500 MCG TABS tablet Take 1 tablet (500 mcg) by mouth daily 90 tablet 3    semaglutide (OZEMPIC, 0.25 OR 0.5 MG/DOSE,) 2 MG/3ML pen Inject 0.25 mg Subcutaneous once a week 3 mL 5    STATIN NOT PRESCRIBED (INTENTIONAL) Please choose reason not prescribed from choices below.       No facility-administered encounter medications on file as of 12/18/2023.        Allergies   Allergen Reactions    Bee Venom Anaphylaxis    Interferons Dermatitis    Penicillins Hives    Azithromycin Dizziness    Aspirin      325mg     Colon Care         Review of systems:  A full 10 point review of systems was obtained and was negative except for the pertinent positives and negatives stated within the HPI.    Objective Findings:   Physical Exam:    Constitutional: There were no vitals taken for this visit.  General: Alert, cooperative, no distress, well-appearing. Uses walker.   Head: Atraumatic, normocephalic, no obvious abnormalities   Eyes: Sclera anicteric, no obvious conjunctival hemorrhage   Nose: Nares normal, no obvious malformation, no obvious rhinorrhea   Respiratory: Resting comfortably, no apparent distress, no cough.   Gastrointestinal: Soft, non distended  Skin: No jaundice, no obvious rash  Neurologic: AAOx3, no obvious neurologic abnormality  Psychiatric: Normal Affect, appropriate mood  Extremities: No obvious edema, no obvious malformation     Labs, Radiology, Pathology     Lab Results   Component Value Date    WBC 11.5 (H) 11/27/2023    WBC 6.4 10/26/2023    WBC 8.1 09/30/2023    HGB 14.2 11/27/2023    HGB 13.4 10/26/2023    HGB 13.9 09/30/2023     11/27/2023     10/26/2023     09/30/2023    CHOL 166 11/27/2023    CHOL 220 (H)  10/26/2023    CHOL 251 (H) 01/27/2022    TRIG 64 11/27/2023    TRIG 96 10/26/2023    TRIG 127 01/27/2022    HDL 76 11/27/2023    HDL 55 10/26/2023    HDL 58 01/27/2022    ALT 8 11/27/2023    ALT 10 10/26/2023    ALT 10 09/30/2023    AST 17 11/27/2023    AST 18 10/26/2023    AST 15 09/30/2023     11/27/2023     10/26/2023     (H) 09/30/2023    BUN 12.1 11/27/2023    BUN 5.1 (L) 10/26/2023    BUN 7 09/30/2023    CO2 31 (H) 11/27/2023    CO2 31 (H) 10/26/2023    CO2 33 (H) 09/30/2023    TSH 3.79 10/26/2023    TSH 2.65 08/31/2022    TSH 1.50 02/07/2008    INR 1.02 02/03/2023    INR 1.02 02/23/2021    INR 0.97 04/30/2008        Liver Function Studies -   Recent Labs   Lab Test 05/25/23  0909   PROTTOTAL 7.0   ALBUMIN 4.0   BILITOTAL 0.3   ALKPHOS 89   AST 14   ALT 9*          Patient Active Problem List    Diagnosis Date Noted    Class 2 severe obesity due to excess calories with serious comorbidity in adult (H) 10/27/2023     Priority: Medium    Combined forms of age-related cataract of both eyes 05/31/2023     Priority: Medium     Added automatically from request for surgery [id number]      Acute and chronic respiratory failure with hypoxia (H) 01/01/2023     Priority: Medium    Gastroesophageal reflux disease without esophagitis 05/24/2022     Priority: Medium    Esophageal dysphagia 05/24/2022     Priority: Medium    Shortness of breath 05/21/2022     Priority: Medium    Chest pain, unspecified type 05/21/2022     Priority: Medium    Hypokalemia 05/21/2022     Priority: Medium    Hypoxia 05/20/2022     Priority: Medium    Diabetic neuropathy (H) 11/18/2021     Priority: Medium    Malnutrition (H24) 06/25/2021     Priority: Medium    Chronic obstructive pulmonary disease, unspecified COPD type (H) 02/23/2021     Priority: Medium    Primary lung adenocarcinoma (H) STAGE: IA2 [id number] poorly diff adeno RLL, then [id number] IA2 suspected NSCLC RUL, medically inoperable  12/15/2020     Priority: Medium     Pulmonary emphysema (H) 09/22/2020     Priority: Medium    Type 2 diabetes mellitus without complication, without long-term current use of insulin (H) 01/09/2019     Priority: Medium    Cervical stenosis (uterine cervix) 03/18/2011     Priority: Medium    Vaginitis 03/14/2011     Priority: Medium    Postmenopausal bleeding 03/14/2011     Priority: Medium      Assessment and Plan   Assessment/Plan:    Jenn Aparicio is a 68 year old female with significant past medical history pertinent for COPD, adenocarcinoma of the lung, acute on chronic hypoxic respiratory failure, DM type II complicated by diabetic neuropathy, who is presenting as a follow up patient with a chief complaint of GERD/abdominal bloating/distension..    #Chronic Dysphagia - Resolved  #GERD    #Hiatal Hernia 2-3 cm     Prior Evaluation Includes:    5/20/2022 esophagram with small hiatal hernia with spontaneous reflux to the level of the clavicle and tertiary contractions     6/28/2023 normal high-resolution manometry study    8/8/2023 EGD with dilation of the cricopharyngeus to 16 mm with a TTS balloon.  Findings also notable for 2 cm hiatal hernia.  There was mild patchy erythematous mucosa within the gastric body/antrum.  Biopsies with mild chronic inactive gastritis. Negative for H. pylori and intestinal metaplasia.    Today Jenn again reports that symptoms of dysphagia have resolved since EGD with dilation 8/8/2023.  She now reports an increase in daily symptoms of heartburn and regurgitation occurring after each meal with her recent increase in Ozempic.  Additionally, with the increase in Ozempic dose she has had abdominal cramping, bloating/distentio, fatigue, leg cramping and gait instability.  Noting specifically the symptoms are worse on the days of the injection and improve 3-4 days later. Jenn also has findings of a 2cm hiatal hernia on recent EGD resulting in a disruption of the reflux barrier likely exacerbating symptoms. Again today  emphasis was placed on weight loss and the impacts this can have on both the regression of a small hiatal hernia and benefits towards reflux symptoms.     If symptoms of dysphagia return would consider repeat upper endoscopy with dilation. If Ozempic is decided to be discontinued by prescribing provider and symptoms of bloating/distention persist would consider a gastric emptying study.     - Reflux gourmet at night and after meals   - Obtain a wedge pillow and sleep with head end of the best elevated   - Do not eat or drink for at least 4 hours prior to laying down for bed   - Continue Omeprazole to 40 mg twice daily, 30 - 60 minutes prior to meals with plans to deescalate to Omeprazole 40mg once daily.   - Reflux lifestyle modifications as directed within the AVS  - Discussion with prescribing provider regarding Ozempic and increase in reflux symptoms     #Colorectal Cancer Screening   Colonoscopy 2018 with >3 adenomatous polyps. Recall 3 years.  No family history of CRC. Per the most recent update by the [place] Multi-Society Task Force on Colorectal Cancer recall should be 3 years, 2021 or sooner if otherwise indicated.     - Colonoscopy for colon cancer screening     Follow up plan:   Return to clinic 4 months and as needed.    The risks and benefits of my recommendations, as well as other treatment options were discussed with the patient and any available family today. All questions were answered.     Follow up: As planned above. Today, I personally spent 22  minutes in direct face to face time with the patient, of which greater than 50% of the time was spent in patient education and counseling as described above. Approximately 12 minutes were spent on indirect care associated with the patient's consultation including but not limited to review of: patient medical records to date, clinic visits, hospital records, lab results, imaging studies, procedural documentation, and coordinating care with other providers. The  findings from this review are summarized in the above note. All of the above accounted for a cumulative time of 34 minutes and was performed on the date of service.     The patient verbalized understanding of the plan and was appreciative for the time spent and information provided during the office visit.           Gabriela Damon PA-C  Division of Gastroenterology, Hepatology, and Nutrition  [facility]       Documentation assisted by voice recognition and documentation system.

## 2023-12-18 ENCOUNTER — TELEPHONE (OUTPATIENT)
Dept: ENDOCRINOLOGY | Facility: CLINIC | Age: 68
End: 2023-12-18

## 2023-12-18 ENCOUNTER — OFFICE VISIT (OUTPATIENT)
Dept: GASTROENTEROLOGY | Facility: CLINIC | Age: 68
End: 2023-12-18
Attending: PHYSICIAN ASSISTANT
Payer: COMMERCIAL

## 2023-12-18 VITALS
SYSTOLIC BLOOD PRESSURE: 140 MMHG | WEIGHT: 209.9 LBS | HEIGHT: 66 IN | BODY MASS INDEX: 33.73 KG/M2 | HEART RATE: 97 BPM | DIASTOLIC BLOOD PRESSURE: 68 MMHG

## 2023-12-18 DIAGNOSIS — K44.9 HIATAL HERNIA: ICD-10-CM

## 2023-12-18 DIAGNOSIS — K21.9 GASTROESOPHAGEAL REFLUX DISEASE, UNSPECIFIED WHETHER ESOPHAGITIS PRESENT: ICD-10-CM

## 2023-12-18 PROCEDURE — 99214 OFFICE O/P EST MOD 30 MIN: CPT | Performed by: PHYSICIAN ASSISTANT

## 2023-12-18 ASSESSMENT — PAIN SCALES - GENERAL: PAINLEVEL: EXTREME PAIN (9)

## 2023-12-18 NOTE — PATIENT INSTRUCTIONS
It was a pleasure taking care of you today.  I've included a brief summary of our discussion and care plan from today's visit below.  Please review this information with your primary care provider.  _______________________________________________________________________    My recommendations are summarized as follows:    - Reflux gourmet at night and after meals   - Obtain a wedge pillow and sleep with head end of the best elevated   - Do not eat or drink for at least 4 hours prior to laying down for bed   - Escalate Omeprazole to 40 mg twice daily, 30 - 60 minutes prior to meals for 8 - 12 weeks. With plans to deescalate to Omeprazole 40mg once daily.   - Reflux lifestyle modifications as directed within the AVS  - Discussion with prescribing provider regarding Ozempic and increase in reflux symptoms       Gastroesophageal Reflux Disease (GERD) Lifestyle Modifications:   If taking acid suppression therapy (PPI ie Pantoprazole, Lansoprazole, Omeprazole, Esomeprazole, Rabeprazole, Dexlansoprazole) it should be taken 30 - 60 minutes prior to meals on an empty stomach to have maximum effect  Avoid triggers for reflux such as coffee, chocolate, carbonated beverages, spicy foods, acidic foods (tomato based/citrus and foods with high fat content   Abstinence from alcohol and cessation of all tobacco products is recommended   Studies have shown that weight loss, exercise and maintaining a healthy BMI significantly reduce GERD symptoms   Remain upright while eating and immediately after meals  Do not eat or drink at least 3 hours prior to laying down supine/laying down for bed   Avoid late night/middle of the night snacking    Consider obtaining a wedge pillow or elevating the head end of the bed while sleeping   Avoid sleeping right side down as this can place the lower part of the esophagus/lower esophageal sphincter in a dependent position that favors reflux   Attempting to eat smaller more frequent meals may improve  symptoms         To schedule endoscopic procedures you may call: [phone]  To schedule radiology (imaging) tests you may call: [phone]  To schedule an ENT appointment you may call: [phone]    Please call my nurse Nicole ([phone]), Gustavo ([phone]) with any questions or concerns.      Return to GI Clinic in 4 months to review your progress.    _______________________________________________________________________    Who do I call with any questions after my visit?  Please be in touch if there are any further questions that arise following today's visit.  There are multiple ways to contact your gastroenterology care team.      During business hours, you may reach a Gastroenterology nurse at [phone] and choose option 3.       To schedule or reschedule an appointment, please call [phone].     You can always send a secure message through [employer].  [employer] messages are answered by your nurse or doctor typically within 24 hours.  Please allow extra time on weekends and holidays.      For urgent/emergent questions after business hours, you may reach the on-call GI Fellow by contacting the [facility]  at [phone].     How will I get the results of any tests ordered?    You will receive all of your results.  If you have signed up for [employer], any tests ordered at your visit will be available to you after your physician reviews them.  Typically this takes 1-2 weeks.  If there are urgent results that require a change in your care plan, your physician or nurse will call you to discuss the next steps.      What is [employer]?  [employer] is a secure way for you to access all of your healthcare records from the [facility].  It is a web based computer program, so you can sign on to it from any location.  It also allows you to send secure messages to your care team.  I recommend signing up for [employer] access if you have not already done so and are comfortable with using  a computer.      How to I schedule a follow-up visit?  If you did not schedule a follow-up visit today, please call [phone] to schedule a follow-up office visit.      If you feel you received exceptional care and are interested in supporting the clinical and research goals of Gabriela Damon PA-C or the Division of Gastroenterology, Hepatology, and Nutrition please contact [email or web address] from the [facility] to discuss opportunities to donate.    Sincerely,    Gabriela Damon PA-C  Division of Gastroenterology, Hepatology, and Nutrition  [facility]

## 2023-12-18 NOTE — PROGRESS NOTES
"Chief Complaint   Patient presents with    Follow Up       Vitals:    12/18/23 0804   BP: (!) 140/68   BP Location: Left arm   Patient Position: Sitting   Cuff Size: Adult Regular   Pulse: 97   Weight: 95.2 kg (209 lb 14.4 oz)   Height: 1.676 m (5' 6\")       Body mass index is 33.88 kg/m .    Candido Marin"

## 2023-12-18 NOTE — TELEPHONE ENCOUNTER
[facility] Call Center    Phone Message    May a detailed message be left on voicemail: yes     Reason for Call: Symptoms or Concerns     If patient has red-flag symptoms, warm transfer to triage line    Current symptom or concern: Muscle cramping, headache, and nausea    Are there any new or worsening symptoms? No    Per pt started these symptoms with the medication semaglutide (OZEMPIC, 0.25 OR 0.5 MG/DOSE,) 2 MG/3ML pen .     Action Taken: Message routed to:  [facility] ([facility] ENDO    Travel Screening: Not Applicable

## 2023-12-18 NOTE — LETTER
12/18/2023         RE: Jenn Aparicio  [address]  [place] [address]        Dear Colleague,    Thank you for referring your patient, Jenn Aparicio, to the [facility] [place]. Please see a copy of my visit note below.    Gastroenterology Visit for: Jenn Aparicio 1955   MRN: [id number]     Reason for Visit:  chief complaint    Referred by: Self  / No address on file  Patient Care Team:  Mitzi Nettles APRN CNP as PCP - General (Nurse Practitioner)  Zarina Leung MD as MD (Internal Medicine-Hematology & Oncology)  Latesha Christianson, JOHANN as Clinic Care Coordinator  Zarina Leung MD as Assigned Cancer Care Provider  Mitzi Nettles APRN CNP as Assigned PCP  Lynda Spann MD as Fellow (Pulmonary Disease)  Earl Mcgrath RPH as Pharmacist (Pharmacist Clinician- Clinical Pharmacy Specialist)  Travis Briseno MD as Assigned Gastroenterology Provider  La Nena Shabazz MD as MD (Endocrinology, Diabetes, and Metabolism)  Nithya Narvaez, RN as Diabetes Educator (Diabetes Education)  Nithya Narvaez RN as Diabetes Educator (Diabetes Education)  La Nena Shabazz MD as Assigned Endocrinology Provider  Kush Santamaria MD as MD (Internal Medicine)  Kailee Moralez MD as Resident (Student in organized health care education/training program)  Gab Walden MD as Hospitalist (Internal Medicine)  Re Keita MD as MD (Ophthalmology)  Samy Prasad MARC as Assigned MTM Pharmacist  Earl Ureña MD as Assigned Pulmonology Provider  Re Keita MD as Assigned Surgical Provider  Ana Davey, RN as Specialty Care Coordinator (Hematology & Oncology)    History of Present Illness:   Jenn Aparicio is a 68 year old female with significant past medical history pertinent for COPD on home O2, adenocarcinoma of the lung (2015), acute on chronic hypoxic respiratory failure, DM type II complicated by diabetic neuropathy, who is presenting as a  follow up patient with a chief complaint of GERD/abdominal bloating/distension.    Interval History December 18, 2023:    Seen by oncology 12/13/2023 with concerns for reoccurrence of disease with plan for repeat PET/possible targeted biopsies.      Since the increase in Ozempic has has increase in regurgitation and dysgeusia despite use of Omeprazole 40 mg BID.  Prior to escalation reflux/regurgitation was well controlled.      Additionally she has had an increase in gas/distention and generalized abdominal cramping also with the increase of the Ozempic. The symptoms have no association with defecation/oral intake.      Associated with gait imbalance, feeling of instability, leg cramping and fatigue.      Denies weight loss, nausea, emesis, dysphagia, odynophagia, bloating/fullness, post prandial bloating, diarrhea, constipation, nocturnal stooling, incontinence, melena, hematochezia and BRBR.     -----------------------------------------------------------------------------------------------------------------------------------------------------------------------------------------------------------------------------------    Interval History September 18, 2023:    CBC CMP 5/2023 WNL.     Since the endoscopy 8/8/2023 she has had no problems with dysphagia.     Currently she is experiencing heartburn/regurgitation daily after each meal. Currently she is taking Omeprazole 20mg twice daily 30 - 60 minutes prior to meals.  Noting lemon, chocolate are a dietary trigger for reflux.     Symptoms of reflux have worsened since initiation of Ozempic.     Denies weight loss, nausea, emesis, dysphagia, odynophagia, abdominal pain, diarrhea, constipation (< 3 stools per week), nocturnal stooling, incontinence of feces, melena, hematochezia and BRBR.     Brother possibly had pancreatic cancer? Patient is not certain to the details    No additional family history or GI related malignancy (esophageal, gastric, pancreatic, liver or  colon).     ------------------------------------------------------------------------------------------------------------------------------------------------------------------------------------------------------------------------------------------    7/2023 Dr. BRITTANY Briseno HPI    History of Present Illness:   This is a patient evidence following for the last few years with severe acid reflux associate with emphysema also dysphagia which has been difficult to pin down she had initially an esophagram which are demonstrated some tertiary contractions and EGD follow-up of this which did not demonstrate any abnormalities with exception of a hiatal hernia.  The patient went on to have an esophageal manometry recently which is read as normal.  The patient's continue to have difficulty swallowing sometimes regurgitating food back up is not sure from where when I talk about her chest or areas that she feels sensations it is difficult for her to answer that.  She sometimes feels like she has a sore throat and cannot swallow.    Esophageal Questionnaire(s)    BEDQ Questionnaire      6/28/2023     2:12 PM   BEDQ Questionnaire: How Often Have You Had the Following?   Trouble eating solid food (meat, bread, vegetables) 4   Trouble eating soft foods (yogurt, jello, pudding) 0   Trouble swallowing liquids 4   Pain while swallowing 0   Coughing or choking while swallowing foods or liquids 2   Total Score: 10         6/28/2023     2:12 PM   BEDQ Questionnaire: Discomfort/Pain Ratings   Eating solid food (meat, bread, vegetables) 0   Eating soft foods (yogurt, jello, pudding) 0   Drinking liquid 0   Total Score: 0       Eckardt Questionnaire      6/28/2023     2:12 PM   Eckardt Questionnaire   Dysphagia 2   Regurgitation 3   Retrosternal Pain 0   Weight Loss (kg) 0   Total Score:  5       Promis 10 Questionnaire       No data to display                STUDIES & PROCEDURES:    EGD:     8/8/2023 Dr. Rodrigues  Findings:       There was pooling of  thin secretions in the posterior        oropharynx/hypopharynx and in the vallecula. This was suctioned by the        anesthesia team repeatedly throughout the procedure        The examined esophagus was normal.        No endoscopic abnormality was evident in the esophagus to explain the        patient's complaint of dysphagia. It was decided, however, to proceed        with dilation at the cricopharyngeus. A TTS dilator was passed through        the scope. Dilation with 11-12-13.5-15-16mm dilator was performed. The        dilation site was examined and showed no change. Estimated blood loss:        none.        The Z-line was regular and was found 38 cm from the incisors.        A 2 cm hiatal hernia was found. The proximal extent of the gastric folds        (end of tubular esophagus) was 38 cm from the incisors. The hiatal        narrowing was 40 cm from the incisors.        The gastroesophageal flap valve was visualized endoscopically and        classified as Hill Grade III (minimal fold, loose to endoscope, hiatal        hernia likely).        Patchy moderately erythematous mucosa without bleeding was found in the        gastric body and in the gastric antrum. Biopsies were taken with a cold        forceps for Helicobacter pylori testing. Verification of patient        identification for the specimen was done. Estimated blood loss: none.        The duodenal bulb, first portion of the duodenum, second portion of the        duodenum and examined duodenum were normal.                                                                                    Impression:   - Normal esophagus.                          - No endoscopic esophageal abnormality to explain                          patient's dysphagia. Esophagus dilated. Dilated.                          - Z-line regular, 38 cm from the incisors.                          - 2 cm hiatal hernia.                          - Gastroesophageal flap valve classified as Hill  Grade                          III (minimal fold, loose to endoscope, hiatal hernia                          likely).                          - Erythematous mucosa in the gastric body and antrum.                          Biopsied.                          - Normal duodenal bulb, first portion of the duodenum,                          second portion of the duodenum and examined duodenum.     Final Diagnosis   A. STOMACH, BIOPSY:  - Gastric mucosa with mild chronic inactive gastritis  - Negative for H. pylori-like organisms on routine staining  - Negative for intestinal metaplasia or dysplasia        8/16/2022   Findings:       The examined esophagus was normal. Normal secondary peristalsis        observered, without any tertiary contractions.        Esophagogastric landmarks were identified: the Z-line was found at 37        cm, the gastroesophageal junction was found at 37 cm and the site of        hiatal narrowing was found at 37 cm from the incisors.        A 3 cm hiatal hernia was present.        No other significant abnormalities were identified in a careful        examination of the stomach.        The cardia and gastric fundus were normal on retroflexion.        The in the duodenum was normal.                                                                                    Impression:               - Normal esophagus.                             - Esophagogastric landmarks identified.                             - 3 cm hiatal hernia. Otherwise normal stomach.                             - Normal duodenum.                             - No specimens collected.     Colonoscopy:    [facility]    Findings:   Three sessile polyps were found in the sigmoid colon, descending colon   and appendiceal orifice. The polyps were 3 to 4 mm in size. These polyps   were removed with a cold snare. Resection and retrieval were complete.   Four sessile polyps were found in the transverse colon and cecum. The   polyps  "were 1 to 2 mm in size. These polyps were removed with a cold   biopsy forceps. Resection and retrieval were complete.   Multiple small and large-mouthed diverticula were found in the sigmoid   colon, descending colon, splenic flexure, hepatic flexure, ascending   colon and cecum.     Impression: - Three 3 to 4 mm polyps in the sigmoid colon, in the   descending colon and at the appendiceal orifice, removed   with a cold snare. Resected and retrieved.   - Four 1 to 2 mm polyps in the transverse colon and in the   cecum, removed with a cold biopsy forceps. Resected and   retrieved.   - Diverticulosis in the sigmoid colon, in the descending   colon, at the splenic flexure, at the hepatic flexure, in   the ascending colon and in the cecum.   Recommendation:    - Discharge patient to home.   - Return to referring physician.   - Repeat colonoscopy in 3 years for surveillance.      Final Diagnosis:   A.  Colon, cecum and appendiceal, polyp, biopsy -   Tubular adenoma, multiple fragments.   B.  Colon, transverse, polyp, biopsy -  Tubular adenoma.   C.  Colon, descending and sigmoid, polyp, biopsy -    Tubular adenoma, multiple fragments.  Sessile serrated adenoma.     EndoFLIP directed at the UES or LES (8cm (EF-325) balloon length or 16cm (EF-322) balloon length):   Date:  8cm balloon  Balloon inflation Balloon pressure CSA (mm^2) DI (mm^2/mmHg) Dmin (mm) Compliance   20 (ladmark ID)        30        40        50           16cm balloon  Balloon inflation Balloon pressure CSA (mm^2) DI (mm^2/mmHg) Dmin (mm) Compliance   30 (ladmark ID)        40        50        60        70           High Resolution Manometry:    6/28/2023   Impression    Interpretation / Findings  \"The baseline tone of the lower esophageal sphincter was normotensive. The lower esophageal sphincter was able to relax appropriately as measured by the IRP: supine median 12.3 mm Hg, upright median 10.4 mm Hg. The EGJ morphology was type II. There was " "peristalsis visible. The DCI, DL, and contractile pattern were/were not within normal limits. There were 10/10 swallows with elevated intrabolus pressure and compartmentalized pressurization. Rapid water swallows were performed with normal augmentation. Rapid Drink Challenge does not indicate an elevated IRP. Supine liquid swallows were performed. Upright liquid swallows were performed. Bolus transit as measured by impedance was complete in 10/10 swallows. This is most consistent with absence of major disorder of esophageal peristalsis. Upper esophageal sphincter pressure somewhat elevated but clinical significant not clear.      Wording of procedure description and interpretation was adapted from [facility] [employer] [facility] Weekly Motility Conference (2018).\"       Impressions  There was no evidence of major disorder of esophageal peristalsis per Geddes 4.0 classification. Consider work up for alternate causes of dysphagia including speech path study with modified barium swallow if not done.       PH/Impedance:     Echeverria:    CT:    5/25/2023 CT AP W Contrast   IMPRESSION:  1.  Unchanged right upper lobe nodular consolidation. However, there is new nodular/masslike area of consolidation anteriorly and laterally. While this may reflect focal pneumonitis, neoplastic progression would be difficult to exclude. Consider further   evaluation with PET/CT or close imaging surveillance with repeat CT in 3 months.  2.  Unchanged scarlike right apical nodule, which is of doubtful clinical significance.  3.  Moderate emphysema.  4.  No new lymphadenopathy.    Esophagram:    5/20/2022 Esophagram   FINDINGS:   The esophagus demonstrates tertiary contractions suggestive of  dysmotility. There is no evidence of intrinsic or extrinsic mass or  polyp. No constricting or obstructing lesion is identified. There is  no mucosal ulceration. Mucosal folds are normal in appearance. Small  hiatal hernia. Spontaneous " gastroesophageal reflux is visualized at  the level of the clavicle in the RPO position with head elevated  approximately 30 degrees.                                                                      IMPRESSION: Small hiatal hernia with spontaneous gastroesophageal  reflux up to the level of the clavicle is visualized. Tertiary  contractions suggestive of esophageal dysmotility.      FL VSS:     GES:    U/S:     XRAY:    Other:       Prior medical records were reviewed including, but not limited to, notes from referring providers, lab work, radiographic tests, and other diagnostic tests. Pertinent results were summarized above.     History     Past Medical History:   Diagnosis Date    Adenocarcinoma, lung (H)     Asthma     Ectopic pregnancy     Esophageal reflux     Pulmonary emphysema (H)     Very severe FEV1<30% predicted    Type II diabetes mellitus (H)        Past Surgical History:   Procedure Laterality Date    2/8/16                R thoracotomy, RLL lobectomy (Dr. Cunha). Adenocarcinoma, 1.1 cm, assoicated with atypical adenomatous hyperplasia Right 02/08/2016    R thoracotomy, RLL lobectomy (Dr. Cunha). Adenocarcinoma, 1.1 cm, assoicated with atypical adenomatous hyperplasia    BRONCHOSCOPY, WITH BIOPSY, ROBOT ASSISTED N/A 7/19/2023    Procedure: robot assisted Ion BRONCHOSCOPY, WITH BIOPSY;  Surgeon: Patria Garcia MD;  Location:  OR    ENDOBRONCHIAL ULTRASOUND FLEXIBLE N/A 7/19/2023    Procedure: Endobronchial ultrasound flexible;  Surgeon: Patria Garcia MD;  Location:  OR    ESOPHAGOSCOPY, GASTROSCOPY, DUODENOSCOPY (EGD), COMBINED N/A 8/16/2022    Procedure: ESOPHAGOGASTRODUODENOSCOPY (EGD);  Surgeon: Travis Briseno MD;  Location:  GI    ESOPHAGOSCOPY, GASTROSCOPY, DUODENOSCOPY (EGD), COMBINED N/A 8/8/2023    Procedure: ESOPHAGOGASTRODUODENOSCOPY, WITH BIOPSY;  Surgeon: Chao Rodrigues DO;  Location:  GI    ORTHOPEDIC SURGERY      PHACOEMULSIFICATION CLEAR CORNEA WITH STANDARD  INTRAOCULAR LENS IMPLANT Left 8/24/2023    Procedure: LEFT EYE PHACOEMULSIFICATION, CATARACT, WITH INTRAOCULAR LENS IMPLANT;  Surgeon: eR Keita MD;  Location: [facility] OR    PHACOEMULSIFICATION CLEAR CORNEA WITH STANDARD INTRAOCULAR LENS IMPLANT Right 9/5/2023    Procedure: RIGHT EYE PHACOEMULSIFICATION, CATARACT, WITH INTRAOCULAR LENS IMPLANT;  Surgeon: Re Keita MD;  Location: [facility] OR       Social History     Socioeconomic History    Marital status: Single     Spouse name: Not on file    Number of children: Not on file    Years of education: Not on file    Highest education level: Not on file   Occupational History    Not on file   Tobacco Use    Smoking status: Former     Packs/day: .25     Types: Cigarettes    Smokeless tobacco: Never    Tobacco comments:     01/02/2023 Patient using 14 mg patch, wants to have prescription for Nicotrol inhaler, took workbook   Substance and Sexual Activity    Alcohol use: Not Currently    Drug use: No    Sexual activity: Not Currently     Partners: Male   Other Topics Concern    Not on file   Social History Narrative    Ms. Aparicio lives in an apartment complex by herself as of May 2022. She has frequent smoke exposure from neighbors even when she is not smoking herself.      Social Determinants of Health     Financial Resource Strain: Not on file   Food Insecurity: Not on file   Transportation Needs: Not on file   Physical Activity: Not on file   Stress: Not on file   Social Connections: Not on file   Interpersonal Safety: Not At Risk (3/1/2023)    Humiliation, Afraid, Rape, and Kick questionnaire     Fear of Current or Ex-Partner: No     Emotionally Abused: No     Physically Abused: No     Sexually Abused: No   Housing Stability: Not on file       Family History   Problem Relation Age of Onset    Thyroid Disease Mother     Cerebrovascular Disease Mother     Hypertension Mother     Hypertension Father     Glaucoma Father     Cancer Sister     Lung Cancer Sister      Diabetes Brother     Cancer Brother     Diabetes Brother     Cancer Brother     Diabetes Brother     Deep Vein Thrombosis (DVT) Daughter     Depression Daughter     Alcohol/Drug Other         self    Diabetes Other         self    Thyroid Disease Other         self    Asthma Other         self    Macular Degeneration No family hx of     Anesthesia Reaction No family hx of      Family history reviewed and edited as appropriate    Medications and Allergies:     Outpatient Encounter Medications as of 12/18/2023   Medication Sig Dispense Refill    acetylcysteine (MUCOMYST) 10 % nebulizer solution Inhale 4 mLs into the lungs 4 times daily 480 mL 0    albuterol (PROAIR HFA/PROVENTIL HFA/VENTOLIN HFA) 108 (90 Base) MCG/ACT inhaler USE 1 OR 2 INHALATIONS EVERY 4 HOURS AS NEEDED (Patient taking differently: Inhale 1 puff into the lungs daily USE 1 OR 2 INHALATIONS EVERY 4 HOURS AS NEEDED) 17 g 3    albuterol (PROVENTIL) (2.5 MG/3ML) 0.083% neb solution Take 1 vial (2.5 mg) by nebulization 4 times daily 360 mL 0    Alpha Lipoic Acid 200 MG CAPS Take 1 capsule by mouth daily (Patient taking differently: Take 1 capsule by mouth every morning) 90 capsule 3    amLODIPine (NORVASC) 10 MG tablet Take 1 tablet (10 mg) by mouth daily 90 tablet 3    atorvastatin (LIPITOR) 10 MG tablet Take 1 tablet (10 mg) by mouth at bedtime 30 tablet 5    buPROPion (WELLBUTRIN XL) 300 MG 24 hr tablet Take 1 tablet (300 mg) by mouth every morning 90 tablet 3    carboxymethylcellulose (REFRESH LIQUIGEL) 1 % ophthalmic solution Apply 1 drop to eye 4 times daily 15 mL 4    cetirizine (ZYRTEC) 10 MG tablet Take 1 tablet (10 mg) by mouth every morning 30 tablet 11    Continuous Blood Gluc  (DEXCOM G7 ) JOSEPHINE Use to read blood sugars as per 's instructions. 1 each 0    Continuous Blood Gluc Sensor (DEXCOM G7 SENSOR) MISC Change every 10 days. 3 each 5    Continuous Blood Gluc Sensor (FREESTYLE GIOVANNI 2 SENSOR) MISC 1 each every 14  days For use with Freestyle Marti 2  for continuous monitioring of blood glucose levels. Replace sensor every 14 days. 2 each 11    cyanocobalamin (VITAMIN B-12) 500 MCG tablet Take 1 tablet (500 mcg) by mouth daily (Patient taking differently: Take 500 mcg by mouth every morning) 90 tablet 1    doxycycline hyclate (VIBRA-TABS) 100 MG tablet Take 1 tablet (100 mg) by mouth 2 times daily for 5 days For COPD flare 10 tablet 3    empagliflozin (JARDIANCE) 25 MG TABS tablet Take 1 tablet (25 mg) by mouth daily 90 tablet 0    EPINEPHrine (ANY BX GENERIC EQUIV) 0.3 MG/0.3ML injection 2-pack Inject 0.3 mLs (0.3 mg) into the muscle as needed for anaphylaxis (related to bee stings) May repeat one time in 5-15 minutes if response to initial dose is inadequate. 2 each 1    fluticasone (FLONASE) 50 MCG/ACT nasal spray Spray 1 spray into both nostrils 2 times daily Use at night before bed 15.8 mL 3    Fluticasone-Umeclidin-Vilanterol (TRELEGY ELLIPTA) 200-62.5-25 MCG/ACT oral inhaler USE 1 INHALATION DAILY 28 each 5    GLUCOSAMINE-CHONDROITIN -400 MG tablet TAKE 1 TABLET DAILY (Patient taking differently: Take 1 tablet by mouth every evening) 90 tablet 3    insulin pen needle (BD MARCIO U/F) 32G X 4 MM miscellaneous Use 1 pen needle daily or as directed. 100 each 1    ipratropium - albuterol 0.5 mg/2.5 mg/3 mL (DUONEB) 0.5-2.5 (3) MG/3ML neb solution Take 1 vial (3 mLs) by nebulization every 6 hours as needed for shortness of breath, wheezing or cough 90 mL 1    nicotine (NICODERM CQ) 14 MG/24HR 24 hr patch Place 1 patch onto the skin every 24 hours      olopatadine (PATADAY) 0.2 % ophthalmic solution Place 0.05 mLs (1 drop) into both eyes daily 2.5 mL 11    omega-3 acid ethyl esters (LOVAZA) 1 g capsule Take 2 capsules (2 g) by mouth 2 times daily (Patient taking differently: Take 1 g by mouth 2 times daily Take 2 capsules (2 g) by mouth 2 times daily) 360 capsule 3    omeprazole (PRILOSEC) 40 MG DR capsule Take 1  capsule (40 mg) by mouth 2 times daily for 180 days 180 capsule 1    predniSONE (DELTASONE) 20 MG tablet Take 2 tablets (40 mg) by mouth daily for 3 days, THEN 1.5 tablets (30 mg) daily for 3 days, THEN 1 tablet (20 mg) daily for 3 days, THEN 0.5 tablets (10 mg) daily for 3 days. 15 tablet 0    predniSONE (DELTASONE) 20 MG tablet Take 2 tablets (40 mg) by mouth daily for 5 days For COPD flares 10 tablet 3    pregabalin (LYRICA) 150 MG capsule TAKE 1 CAPSULE(150 MG) BY MOUTH TWICE DAILY 270 capsule 1    roflumilast (DALIRESP) 500 MCG TABS tablet Take 1 tablet (500 mcg) by mouth daily 90 tablet 3    semaglutide (OZEMPIC, 0.25 OR 0.5 MG/DOSE,) 2 MG/3ML pen Inject 0.25 mg Subcutaneous once a week 3 mL 5    STATIN NOT PRESCRIBED (INTENTIONAL) Please choose reason not prescribed from choices below.       No facility-administered encounter medications on file as of 12/18/2023.        Allergies   Allergen Reactions    Bee Venom Anaphylaxis    Interferons Dermatitis    Penicillins Hives    Azithromycin Dizziness    Aspirin      325mg     Colon Care         Review of systems:  A full 10 point review of systems was obtained and was negative except for the pertinent positives and negatives stated within the HPI.    Objective Findings:   Physical Exam:    Constitutional: There were no vitals taken for this visit.  General: Alert, cooperative, no distress, well-appearing. Uses walker.   Head: Atraumatic, normocephalic, no obvious abnormalities   Eyes: Sclera anicteric, no obvious conjunctival hemorrhage   Nose: Nares normal, no obvious malformation, no obvious rhinorrhea   Respiratory: Resting comfortably, no apparent distress, no cough.   Gastrointestinal: Soft, non distended  Skin: No jaundice, no obvious rash  Neurologic: AAOx3, no obvious neurologic abnormality  Psychiatric: Normal Affect, appropriate mood  Extremities: No obvious edema, no obvious malformation     Labs, Radiology, Pathology     Lab Results   Component Value  Date    WBC 11.5 (H) 11/27/2023    WBC 6.4 10/26/2023    WBC 8.1 09/30/2023    HGB 14.2 11/27/2023    HGB 13.4 10/26/2023    HGB 13.9 09/30/2023     11/27/2023     10/26/2023     09/30/2023    CHOL 166 11/27/2023    CHOL 220 (H) 10/26/2023    CHOL 251 (H) 01/27/2022    TRIG 64 11/27/2023    TRIG 96 10/26/2023    TRIG 127 01/27/2022    HDL 76 11/27/2023    HDL 55 10/26/2023    HDL 58 01/27/2022    ALT 8 11/27/2023    ALT 10 10/26/2023    ALT 10 09/30/2023    AST 17 11/27/2023    AST 18 10/26/2023    AST 15 09/30/2023     11/27/2023     10/26/2023     (H) 09/30/2023    BUN 12.1 11/27/2023    BUN 5.1 (L) 10/26/2023    BUN 7 09/30/2023    CO2 31 (H) 11/27/2023    CO2 31 (H) 10/26/2023    CO2 33 (H) 09/30/2023    TSH 3.79 10/26/2023    TSH 2.65 08/31/2022    TSH 1.50 02/07/2008    INR 1.02 02/03/2023    INR 1.02 02/23/2021    INR 0.97 04/30/2008        Liver Function Studies -   Recent Labs   Lab Test 05/25/23  0909   PROTTOTAL 7.0   ALBUMIN 4.0   BILITOTAL 0.3   ALKPHOS 89   AST 14   ALT 9*          Patient Active Problem List    Diagnosis Date Noted    Class 2 severe obesity due to excess calories with serious comorbidity in adult (H) 10/27/2023     Priority: Medium    Combined forms of age-related cataract of both eyes 05/31/2023     Priority: Medium     Added automatically from request for surgery [id number]      Acute and chronic respiratory failure with hypoxia (H) 01/01/2023     Priority: Medium    Gastroesophageal reflux disease without esophagitis 05/24/2022     Priority: Medium    Esophageal dysphagia 05/24/2022     Priority: Medium    Shortness of breath 05/21/2022     Priority: Medium    Chest pain, unspecified type 05/21/2022     Priority: Medium    Hypokalemia 05/21/2022     Priority: Medium    Hypoxia 05/20/2022     Priority: Medium    Diabetic neuropathy (H) 11/18/2021     Priority: Medium    Malnutrition (H24) 06/25/2021     Priority: Medium    Chronic obstructive  pulmonary disease, unspecified COPD type (H) 02/23/2021     Priority: Medium    Primary lung adenocarcinoma (H) STAGE: IA2 [id number] poorly diff adeno RLL, then [id number] IA2 suspected NSCLC RUL, medically inoperable  12/15/2020     Priority: Medium    Pulmonary emphysema (H) 09/22/2020     Priority: Medium    Type 2 diabetes mellitus without complication, without long-term current use of insulin (H) 01/09/2019     Priority: Medium    Cervical stenosis (uterine cervix) 03/18/2011     Priority: Medium    Vaginitis 03/14/2011     Priority: Medium    Postmenopausal bleeding 03/14/2011     Priority: Medium      Assessment and Plan   Assessment/Plan:    Jenn Aparicio is a 68 year old female with significant past medical history pertinent for COPD, adenocarcinoma of the lung, acute on chronic hypoxic respiratory failure, DM type II complicated by diabetic neuropathy, who is presenting as a follow up patient with a chief complaint of GERD/abdominal bloating/distension..    #Chronic Dysphagia - Resolved  #GERD    #Hiatal Hernia 2-3 cm     Prior Evaluation Includes:    5/20/2022 esophagram with small hiatal hernia with spontaneous reflux to the level of the clavicle and tertiary contractions     6/28/2023 normal high-resolution manometry study    8/8/2023 EGD with dilation of the cricopharyngeus to 16 mm with a TTS balloon.  Findings also notable for 2 cm hiatal hernia.  There was mild patchy erythematous mucosa within the gastric body/antrum.  Biopsies with mild chronic inactive gastritis. Negative for H. pylori and intestinal metaplasia.    Today Jenn again reports that symptoms of dysphagia have resolved since EGD with dilation 8/8/2023.  She now reports an increase in daily symptoms of heartburn and regurgitation occurring after each meal with her recent increase in Ozempic.  Additionally, with the increase in Ozempic dose she has had abdominal cramping, bloating/distentio, fatigue, leg cramping and gait instability.   Noting specifically the symptoms are worse on the days of the injection and improve 3-4 days later. Jenn also has findings of a 2cm hiatal hernia on recent EGD resulting in a disruption of the reflux barrier likely exacerbating symptoms. Again today emphasis was placed on weight loss and the impacts this can have on both the regression of a small hiatal hernia and benefits towards reflux symptoms.     If symptoms of dysphagia return would consider repeat upper endoscopy with dilation. If Ozempic is decided to be discontinued by prescribing provider and symptoms of bloating/distention persist would consider a gastric emptying study.     - Reflux gourmet at night and after meals   - Obtain a wedge pillow and sleep with head end of the best elevated   - Do not eat or drink for at least 4 hours prior to laying down for bed   - Continue Omeprazole to 40 mg twice daily, 30 - 60 minutes prior to meals with plans to deescalate to Omeprazole 40mg once daily.   - Reflux lifestyle modifications as directed within the AVS  - Discussion with prescribing provider regarding Ozempic and increase in reflux symptoms     #Colorectal Cancer Screening   Colonoscopy 2018 with >3 adenomatous polyps. Recall 3 years.  No family history of CRC. Per the most recent update by the [place] Multi-Society Task Force on Colorectal Cancer recall should be 3 years, 2021 or sooner if otherwise indicated.     - Colonoscopy for colon cancer screening     Follow up plan:   Return to clinic 4 months and as needed.    The risks and benefits of my recommendations, as well as other treatment options were discussed with the patient and any available family today. All questions were answered.     Follow up: As planned above. Today, I personally spent 22  minutes in direct face to face time with the patient, of which greater than 50% of the time was spent in patient education and counseling as described above. Approximately 12 minutes were spent on indirect care  "associated with the patient's consultation including but not limited to review of: patient medical records to date, clinic visits, hospital records, lab results, imaging studies, procedural documentation, and coordinating care with other providers. The findings from this review are summarized in the above note. All of the above accounted for a cumulative time of 34 minutes and was performed on the date of service.     The patient verbalized understanding of the plan and was appreciative for the time spent and information provided during the office visit.           Gabriela Damon PA-C  Division of Gastroenterology, Hepatology, and Nutrition  [facility]       Documentation assisted by voice recognition and documentation system.          Chief Complaint   Patient presents with    Follow Up       Vitals:    12/18/23 0804   BP: (!) 140/68   BP Location: Left arm   Patient Position: Sitting   Cuff Size: Adult Regular   Pulse: 97   Weight: 95.2 kg (209 lb 14.4 oz)   Height: 1.676 m (5' 6\")       Body mass index is 33.88 kg/m .    Candido Marin      Again, thank you for allowing me to participate in the care of your patient.      Sincerely,    Gabriela Damon PA-C  "

## 2023-12-18 NOTE — TELEPHONE ENCOUNTER
I called the pt, she had these sx last week when she took the 0.5 mg dose and when she previously tried this dose. I advised the pt to stay off of ozempic until she feels better and when she is ready to resume, use the 0.25 mg dose. Pt understands.

## 2023-12-19 ENCOUNTER — TUMOR CONFERENCE (OUTPATIENT)
Dept: ONCOLOGY | Facility: CLINIC | Age: 68
End: 2023-12-19
Payer: COMMERCIAL

## 2023-12-19 NOTE — TELEPHONE ENCOUNTER
I agree, Ms. Aparicio can either reduce Ozempic back to last tolerated dose or discontinue altogether.      no

## 2023-12-20 ENCOUNTER — PATIENT OUTREACH (OUTPATIENT)
Dept: ONCOLOGY | Facility: CLINIC | Age: 68
End: 2023-12-20
Payer: COMMERCIAL

## 2023-12-20 DIAGNOSIS — C34.31 MALIGNANT NEOPLASM OF LOWER LOBE OF RIGHT LUNG (H): Primary | ICD-10-CM

## 2023-12-20 NOTE — PROGRESS NOTES
[facility]/Voicemail    Clinical Data: Care Coordinator Outreach     Reaching out to Jenn to update her on tumor board recommendations.Outreach attempted x 1.  Left message on patient's voicemail with call back information and requested return call.    Plan: Care Coordinator will try to reach patient again in 1-2 business days.    Addendum 12/21/23 9:48 AM  Phone call with Jenn. Discussed plan for her to be seen in PAC clinic around next appointment with Dr. Leung to assess/discuss anesthesia risk for endobronchial bx.  Relayed we haven't committed her to a bx yet but want to be prepared if we do pursue this. Jenn voiced an understanding.     Ana Davey, RN, BSN  RN Care Coordinator  [facility]

## 2023-12-20 NOTE — TUMOR CONFERENCE
Tumor Conference Information  Tumor Conference: Thoracic  Specialties Present: Medical oncology, Radiation oncology, Pathology, Radiology, Surgery  Patient Status: Prospective  Stage: [id number] poorly diff adeno RLL, then [id number] IA2 suspected NSCLC RUL  Treatment to Date: Biopsy, Radiation, Surgery  Clinical Trials: Not discussed  Genetic Testing Discussed/Recommended?: No  Supportive Care Services Discussed/Recommended?: Yes  Recommended Plan: Follows evidence-based guidelines  Did the review exceed 30 minutes?: did not       Likely candidate for bronchoscopy.    Plan for PET scan, PAC consult to assess anesthesia risk, then possible bronchoscopy pending discussion with Dr. Leung on 1/18.    Documentation / Disclaimer Cancer Tumor Board Note  Cancer tumor board recommendations do not override what is determined to be reasonable care and treatment, which is dependent on the circumstances of a patient's case; the patient's medical, social, and personal concerns; and the clinical judgment of the oncologist [physician].

## 2023-12-21 ENCOUNTER — TRANSFERRED RECORDS (OUTPATIENT)
Dept: HEALTH INFORMATION MANAGEMENT | Facility: CLINIC | Age: 68
End: 2023-12-21

## 2024-01-05 NOTE — TELEPHONE ENCOUNTER
FUTURE VISIT INFORMATION      SURGERY INFORMATION:  Date: 3/21/24  Location: [facility]  Surgeon:  Travis Briseno MD   Anesthesia Type:  MAC  Procedure: Colonoscopy     RECORDS REQUESTED FROM:       Primary Care Provider: Mitzi Nettles APRN [facility]    Most recent EKG+ Tracin23    Most recent ECHO: 22    Most recent PFT's: 23       pls add NICHOL and DNA/DESHAUN blood tests to her preoperative labs.

## 2024-01-11 ENCOUNTER — VIRTUAL VISIT (OUTPATIENT)
Dept: PHARMACY | Facility: CLINIC | Age: 69
End: 2024-01-11
Payer: COMMERCIAL

## 2024-01-11 DIAGNOSIS — H04.123 DRY EYE SYNDROME OF BOTH EYES: ICD-10-CM

## 2024-01-11 DIAGNOSIS — J44.9 CHRONIC OBSTRUCTIVE PULMONARY DISEASE, UNSPECIFIED COPD TYPE (H): ICD-10-CM

## 2024-01-11 DIAGNOSIS — I10 ESSENTIAL HYPERTENSION: ICD-10-CM

## 2024-01-11 DIAGNOSIS — E11.40 DIABETIC NEUROPATHY (H): ICD-10-CM

## 2024-01-11 DIAGNOSIS — E11.9 TYPE 2 DIABETES MELLITUS WITHOUT COMPLICATION, WITHOUT LONG-TERM CURRENT USE OF INSULIN (H): Primary | ICD-10-CM

## 2024-01-11 DIAGNOSIS — Z78.9 TAKES DIETARY SUPPLEMENTS: ICD-10-CM

## 2024-01-11 DIAGNOSIS — E11.42 DIABETIC POLYNEUROPATHY ASSOCIATED WITH TYPE 2 DIABETES MELLITUS (H): ICD-10-CM

## 2024-01-11 DIAGNOSIS — E78.5 HYPERLIPIDEMIA LDL GOAL <100: ICD-10-CM

## 2024-01-11 DIAGNOSIS — E78.5 HYPERLIPIDEMIA, UNSPECIFIED HYPERLIPIDEMIA TYPE: ICD-10-CM

## 2024-01-11 DIAGNOSIS — K21.9 GASTROESOPHAGEAL REFLUX DISEASE WITHOUT ESOPHAGITIS: ICD-10-CM

## 2024-01-11 DIAGNOSIS — F17.210 CIGARETTE SMOKER: ICD-10-CM

## 2024-01-11 PROCEDURE — 99607 MTMS BY PHARM ADDL 15 MIN: CPT | Mod: 93 | Performed by: PHARMACIST

## 2024-01-11 PROCEDURE — 99605 MTMS BY PHARM NP 15 MIN: CPT | Mod: 93 | Performed by: PHARMACIST

## 2024-01-11 RX ORDER — PERPHENAZINE 16 MG
600 TABLET ORAL DAILY
Qty: 90 CAPSULE | Refills: 3 | Status: SHIPPED | OUTPATIENT
Start: 2024-01-11 | End: 2024-02-16

## 2024-01-11 RX ORDER — CAPSAICIN 0.025 %
CREAM (GRAM) TOPICAL
Qty: 25 G | Refills: 11 | Status: SHIPPED | OUTPATIENT
Start: 2024-01-11 | End: 2024-06-26

## 2024-01-11 RX ORDER — EZETIMIBE 10 MG/1
10 TABLET ORAL DAILY
Qty: 90 TABLET | Refills: 3 | Status: SHIPPED | OUTPATIENT
Start: 2024-01-11 | End: 2024-02-08

## 2024-01-11 RX ORDER — BUPROPION HYDROCHLORIDE 150 MG/1
150 TABLET ORAL EVERY MORNING
Qty: 14 TABLET | Refills: 0 | Status: SHIPPED | OUTPATIENT
Start: 2024-01-11 | End: 2024-02-08

## 2024-01-11 RX ORDER — ASPIRIN 81 MG/1
81 TABLET ORAL EVERY MORNING
COMMUNITY
End: 2024-02-05

## 2024-01-11 NOTE — LETTER
January 13, 2024  Jenn Markus  [address]  [place] [address]    Dear Ms. Aparicio, [facility]     Thank you for talking with me on Jan 11, 2024 about your health and medications. As a follow-up to our conversation, I have included two documents:      Your Recommended To-Do List has steps you should take to get the best results from your medications.  Your Medication List will help you keep track of your medications and how to take them.    If you want to talk about these documents, please call Samy Prasad RPH at phone: [phone], Monday-Friday 8-4:30pm.    I look forward to working with you and your doctors to make sure your medications work well for you.    Sincerely,  Samy Prasad RPH  [facility] Pharmacist, [facility]

## 2024-01-11 NOTE — LETTER
_  Medication List        Prepared on: 01/13/2024     Bring your Medication List when you go to the doctor, hospital, or   emergency room. And, share it with your family or caregivers.     Note any changes to how you take your medications.  Cross out medications when you no longer use them.    Medication How I take it Why I use it Prescriber   acetylcysteine (MUCOMYST) 10 % nebulizer solution Inhale 4 mLs into the lungs 4 times daily Acute and chronic respiratory failure with hypoxia (H) BENOIT Razo CNP   albuterol (PROAIR HFA/PROVENTIL HFA/VENTOLIN HFA) 108 (90 Base) MCG/ACT inhaler USE 1 OR 2 INHALATIONS EVERY 4 HOURS AS NEEDED Chronic obstructive pulmonary disease, unspecified COPD type (H) BENOIT Razo CNP   albuterol (PROVENTIL) (2.5 MG/3ML) 0.083% neb solution Take 1 vial (2.5 mg) by nebulization 4 times daily COPD Exacerbation (H) BENOIT Razo CNP   alpha-lipoic acid 600 MG capsule Take 1 capsule (600 mg) by mouth daily Diabetic Neuropathy (H) BENOIT Razo CNP   amLODIPine (NORVASC) 10 MG tablet Take 1 tablet (10 mg) by mouth daily Essential Hypertension BENOIT Razo CNP   aspirin 81 MG EC tablet Take 81 mg by mouth daily  Heart protection Patient Reported   buPROPion (WELLBUTRIN XL) 150 MG 24 hr tablet Take 1 tablet (150 mg) by mouth every morning Cigarette Smoker BENOIT Razo CNP   capsaicin (ZOSTRIX) 0.025 % external cream Apply a thin amount topically 3 times daily as needed (neuropathy in feet.) Diabetic Neuropathy (H) BENOIT Razo CNP   carboxymethylcellulose (REFRESH LIQUIGEL) 1 % ophthalmic solution Apply 1 drop to eye 4 times daily Dry Eyes BENOIT Razo CNP   cetirizine (ZYRTEC) 10 MG tablet Take 1 tablet (10 mg) by mouth every morning Seasonal allergic rhinitis, unspecified trigger Zarina Leung MD   Continuous Blood Gluc  (DEXCOM G7 ) JOSEPHINE Use to read blood sugars as per 's  instructions. Type 2 diabetes mellitus with hyperglycemia, without long-term current use of insulin (H) La Nena Shabazz MD   Continuous Blood Gluc Sensor (DEXCOM G7 SENSOR) MISC Change every 10 days. Type 2 diabetes mellitus with hyperglycemia, without long-term current use of insulin (H) La Nena Shabazz MD   cyanocobalamin (VITAMIN B-12) 500 MCG tablet Take 1 tablet (500 mcg) by mouth daily Vitamin B12 Deficiency (Non Anemic) La Nena Shabazz MD   dulaglutide (TRULICITY) 0.75 MG/0.5ML pen Inject 0.75 mg Subcutaneous every 7 days Type 2 diabetes mellitus without complication, without long-term current use of insulin (H) BENOIT Razo CNP   empagliflozin (JARDIANCE) 25 MG TABS tablet Take 1 tablet (25 mg) by mouth daily Type 2 diabetes mellitus with hyperglycemia, without long-term current use of insulin (H) La Nena Shabazz MD   EPINEPHrine (ANY BX GENERIC EQUIV) 0.3 MG/0.3ML injection 2-pack Inject 0.3 mLs (0.3 mg) into the muscle as needed for anaphylaxis (related to bee stings) May repeat one time in 5-15 minutes if response to initial dose is inadequate. Bee Sting Reaction BENOIT Razo CNP   ezetimibe (ZETIA) 10 MG tablet Take 1 tablet (10 mg) by mouth daily Type 2 diabetes mellitus without complication, without long-term current use of insulin (H); Hyperlipidemia, unspecified hyperlipidemia type BENOIT Razo CNP   fluticasone (FLONASE) 50 MCG/ACT nasal spray Spray 1 spray into both nostrils 2 times daily Use at night before bed Seasonal allergic rhinitis, unspecified trigger Kailee Moralez MD   Fluticasone-Umeclidin-Vilanterol (TRELEGY ELLIPTA) 200-62.5-25 MCG/ACT oral inhaler USE 1 INHALATION DAILY Chronic obstructive pulmonary disease, unspecified COPD type (H) Zarina Leung MD   GLUCOSAMINE-CHONDROITIN -400 MG tablet TAKE 1 TABLET DAILY Type 2 diabetes mellitus without complication, without long-term current use of insulin (H) BENOIT Razo CNP   insulin  pen needle (BD MARCIO U/F) 32G X 4 MM miscellaneous Use 1 pen needle daily or as directed. Type 2 diabetes mellitus without complication, without long-term current use of insulin (H) La Nena Shabazz MD   ipratropium - albuterol 0.5 mg/2.5 mg/3 mL (DUONEB) 0.5-2.5 (3) MG/3ML neb solution Take 1 vial (3 mLs) by nebulization every 6 hours as needed for shortness of breath, wheezing or cough COPD Exacerbation (H) Gregory Hahn PA-C   nicotine (NICODERM CQ) 14 MG/24HR 24 hr patch Place 1 patch onto the skin every 24 hours  Smoking cessation Patient Reported   olopatadine (PATADAY) 0.2 % ophthalmic solution Place 0.05 mLs (1 drop) into both eyes daily Allergic Conjunctivitis of Both Eyes Joe Landers, JUAN ANTONIO   omega-3 acid ethyl esters (LOVAZA) 1 g capsule Take 2 capsules (2 g) by mouth 2 times daily Type 2 diabetes mellitus without complication, without long-term current use of insulin (H) BENOIT Razo CNP   omeprazole (PRILOSEC) 40 MG DR capsule Take 1 capsule (40 mg) by mouth 2 times daily for 180 days Gastroesophageal reflux disease, unspecified whether esophagitis present; Hiatal Hernia Gabriela Damon PA-C   pregabalin (LYRICA) 150 MG capsule TAKE 1 CAPSULE(150 MG) BY MOUTH TWICE DAILY Type 2 diabetes mellitus with hyperglycemia, without long-term current use of insulin (H); Diabetic peripheral neuropathy (H) BENOIT Razo CNP   roflumilast (DALIRESP) 500 MCG TABS tablet Take 1 tablet (500 mcg) by mouth daily Chronic obstructive pulmonary disease, unspecified COPD type (H) Kailee Moralez MD         Add new medications, over-the-counter drugs, herbals, vitamins, or  minerals in the blank rows below.    Medication How I take it Why I use it Prescriber                                      Allergies:      aspirin; bee venom; penicillins; azithromycin; colon care; interferons        Side effects I have had:               Other Information:              My notes and questions:

## 2024-01-11 NOTE — PROGRESS NOTES
Medication Therapy Management (MTM) Encounter    ASSESSMENT:                            Medication Adherence/Access: No issues identified      Diabetes:   Patient is not meeting A1c goal of < 7%.  Self monitoring of blood glucose is not at goal of fasting  mg/dL.  Since patient is having adverse reactions to Ozempic she would benefit from trialing a different GLP-1    Hypertension:   Patient is not meeting blood pressure goal of < 130/80mmHg. Need recent blood pressure for further assessment      Dry eyes:  Stable.     Allergies:  Stable.     Smoking Cessation:   Okay to decrease bupropion to 150 mg for 2 weeks then sotp       Hyperlipidemia:  Since she has been having some muscle soreness will transition her to a medication with lower risk for this side effect.    GERD:   Stable.     Pain:  Too refill her alpha lipoic acid.    COPD:   Stable.     Supplements:  Stable.       PLAN:                            Change atorvastatin to ezetimibe  Try capsaicin for neuropathy in feet.   Decrease bupropion to 150 mg daily for 2 weeks and then stop  Murguia ozempic to Trulicity 0.75 mg once weekly  Refilled ALA today    Follow-up: Return in about 4 weeks (around 2/8/2024).    SUBJECTIVE/OBJECTIVE:                          Jenn Aparicio is a 68 year old female called for a follow-up visit from 10/12.       Reason for visit: diabetes follow-up. Comprehensive Medication Review [employer] Part [employer].    Allergies/ADRs: Reviewed in chart  Past Medical History: Reviewed in chart  Tobacco: She reports that she has quit smoking. Her smoking use included cigarettes. She smoked an average of 0.3 packs per day. She has been exposed to tobacco smoke. She has never used smokeless tobacco.  Alcohol: none    Medication Adherence/Access: no issues reported    Diabetes   Jardiance 25 mg daily  Ozempic 0.5 mg weekly - reports this makes her feel sick and hard for her to eat  Aspirin 81 mg daily for primary prevention. - reports she is  having more and more bruises. Reports she was told to resume.     Patient is not experiencing side effects.  Blood sugar monitoring: CGM. Ranges (patient reported):   Reports that her blood glucose has recently eaten and blood glucose is 215.   Symptoms of low blood sugar? reports sometimes she gets shaky  Symptoms of high blood sugar? none  Eye exam: due  Foot exam: due    Statin: Atorvastatin  ACEi/ARB: No.        Eye exam is up to date  Foot exam is up to date  Urine Albumin:   Lab Results   Component Value Date    UMALCR 271.48 (H) 08/23/2023      Lab Results   Component Value Date    A1C 8.1 (H) 11/27/2023       Dry eyes:  Refresh eye gel as needed   Olopatadine 1 drop into both eyes    No concerns or questions at this time.    Allergies:  Cetirizine 10 mg - this is helpful  Flonase daily  Epipen as needed       Smoking Cessation:   Wellbutrin 300 mg daily - reports she would like to go off of this.  nicotine patch 14 mg daily (doesn't want to go down to the 7 mg patch yet)  Has not been smoking recently. Last time was a couple months ago.        Hyperlipidemia:  Atorvastatin 10 mg daily    Reports since starting this she has had some insomnia. Also has had some muscle soreness when she is moving around and this is newer       Recent Labs   Lab Test 11/27/23  0833 10/26/23  0912   CHOL 166 220*   HDL 76 55   LDL 77 146*   TRIG 64 96     GERD:   Omeprazole 40 mg twice daily   Patient feels that current regimen is effective.    Pain:  Alpha lipoic acid daily  Lyrica 150 mg twice a day  Glucosamine-chondroitin daily     Reports her hands dont burn anymore but her feet still do.     Hypertension/CAD prevention:   amlodipine 10 mg daily  aspirin 81 mg.    Fish oil twice a day     Patient does not self-monitor blood pressure.    Patient reports the following medication side effects:swelling in ankles.   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     Supplements:  Vitamin B 12 5000 mcg daily    No concerns or questions at this time.     Today's Vitals: There were no vitals taken for this visit.  ----------------      I spent 26 minutes with this patient today. All changes were made via collaborative practice agreement with BENOIT Ruiz CNP. A copy of the visit note was provided to the patient's provider(s).    A summary of these recommendations was sent via [employer].    Samy Prasad, Pharm. D., BCACP  Medication Therapy Management Pharmacist      Telemedicine Visit Details  Type of service:  Telephone visit  Start Time:  901 am  End Time:  927 am     Medication Therapy Recommendations  Cigarette smoker    Current Medication: buPROPion (WELLBUTRIN XL) 300 MG 24 hr tablet (Discontinued)   Rationale: Patient prefers not to take - Adherence - Adherence   Recommendation: Discontinue Medication   Status: Accepted - no CPA Needed         Diabetic neuropathy (H)    Rationale: Synergistic therapy - Needs additional medication therapy - Indication   Recommendation: Start Medication - CAPSAICIN   Status: Accepted per CPA         Hyperlipidemia LDL goal <100    Current Medication: atorvastatin (LIPITOR) 10 MG tablet (Discontinued)   Rationale: Undesirable effect - Adverse medication event - Safety   Recommendation: Change Medication - EZETIMIBE   Status: Accepted per CPA         Type 2 diabetes mellitus without complication, without long-term current use of insulin (H)    Current Medication:  semaglutide (OZEMPIC, 0.25 OR 0.5 MG/DOSE,) 2 MG/3ML pen (Discontinued)   Rationale: Undesirable effect - Adverse medication event - Safety   Recommendation: Change Medication   Status: Accepted per CPA

## 2024-01-11 NOTE — LETTER
"Recommended To-Do List      Prepared on: 01/13/2024       You can get the best results from your medications by completing the items on this \"To-Do List.\"      Bring your To-Do List when you go to your doctor. And, share it with your family or caregivers.    My To-Do List:  What we talked about: What I should do:   The importance of taking your medication as intended    Stop taking buPROPion (WELLBUTRIN XL)          What we talked about: What I should do:   A new medication that may be of benefit to you    Start taking CAPSAICIN          What we talked about: What I should do:   An issue with your medication    Change the medication you are taking from atorvastatin (LIPITOR) to EZETIMIBE          What we talked about: What I should do:   An issue with your medication    Change the medication you are taking from Ozempic (0.25 or 0.5 MG/DOSE) to Trulicity          What we talked about: What I should do:                     "

## 2024-01-13 NOTE — PATIENT INSTRUCTIONS
"Recommendations from today's MTM visit:                                                    MTM (medication therapy management) is a service provided by a clinical pharmacist designed to help you get the most of out of your medicines.   Today we reviewed what your medicines are for, how to know if they are working, that your medicines are safe and how to make your medicine regimen as easy as possible.    Change atorvastatin to ezetimibe  Try capsaicin for neuropathy in feet.   Decrease bupropion to 150 mg daily for 2 weeks and then stop  Murguia ozempic to Trulicity 0.75 mg once weekly  Refilled ALA today    Follow-up: Return in about 4 weeks (around 2/8/2024).    It was great speaking with you today.  I value your experience and would be very thankful for your time in providing feedback in our clinic survey. In the next few days, you may receive an email or text message from [employer] system master with a link to a survey related to your  clinical pharmacist.\"     To schedule another MTM appointment, please call the clinic directly or you may call the MTM scheduling line at [phone] or toll-free at [phone].     My Clinical Pharmacist's contact information:                                                      Please feel free to contact me with any questions or concerns you have.      Samy Prasad, Pharm. D., [facility]  Medication Therapy Management Pharmacist    "

## 2024-01-17 ENCOUNTER — HOSPITAL ENCOUNTER (OUTPATIENT)
Dept: PET IMAGING | Facility: CLINIC | Age: 69
Discharge: HOME | End: 2024-01-17
Attending: INTERNAL MEDICINE | Admitting: INTERNAL MEDICINE
Payer: COMMERCIAL

## 2024-01-17 DIAGNOSIS — C34.31 MALIGNANT NEOPLASM OF LOWER LOBE OF RIGHT LUNG (H): ICD-10-CM

## 2024-01-17 PROCEDURE — 71260 CT THORAX DX C+: CPT

## 2024-01-17 PROCEDURE — 78816 PET IMAGE W/CT FULL BODY: CPT | Mod: PS

## 2024-01-17 PROCEDURE — 78816 PET IMAGE W/CT FULL BODY: CPT | Mod: 26 | Performed by: RADIOLOGY

## 2024-01-17 PROCEDURE — 343N000001 HC RX 343: Performed by: INTERNAL MEDICINE

## 2024-01-17 PROCEDURE — 74177 CT ABD & PELVIS W/CONTRAST: CPT | Mod: 26 | Performed by: RADIOLOGY

## 2024-01-17 PROCEDURE — 250N000011 HC RX IP 250 OP 636: Performed by: INTERNAL MEDICINE

## 2024-01-17 PROCEDURE — A9552 F18 FDG: HCPCS | Performed by: INTERNAL MEDICINE

## 2024-01-17 PROCEDURE — 71260 CT THORAX DX C+: CPT | Mod: 26 | Performed by: RADIOLOGY

## 2024-01-17 RX ORDER — IOPAMIDOL 755 MG/ML
10-135 INJECTION, SOLUTION INTRAVASCULAR ONCE
Status: COMPLETED | OUTPATIENT
Start: 2024-01-17 | End: 2024-01-17

## 2024-01-17 RX ADMIN — IOPAMIDOL 128 ML: 755 INJECTION, SOLUTION INTRAVENOUS at 10:55

## 2024-01-17 RX ADMIN — FLUDEOXYGLUCOSE F-18 12.47 MILLICURIE: 500 INJECTION, SOLUTION INTRAVENOUS at 09:45

## 2024-01-18 ENCOUNTER — OFFICE VISIT (OUTPATIENT)
Dept: SURGERY | Facility: CLINIC | Age: 69
End: 2024-01-18
Payer: COMMERCIAL

## 2024-01-18 ENCOUNTER — PRE VISIT (OUTPATIENT)
Dept: SURGERY | Facility: CLINIC | Age: 69
End: 2024-01-18

## 2024-01-18 ENCOUNTER — ONCOLOGY VISIT (OUTPATIENT)
Dept: ONCOLOGY | Facility: CLINIC | Age: 69
End: 2024-01-18
Attending: INTERNAL MEDICINE
Payer: COMMERCIAL

## 2024-01-18 ENCOUNTER — LAB (OUTPATIENT)
Dept: LAB | Facility: CLINIC | Age: 69
End: 2024-01-18
Payer: COMMERCIAL

## 2024-01-18 VITALS
HEART RATE: 90 BPM | TEMPERATURE: 98.7 F | OXYGEN SATURATION: 96 % | BODY MASS INDEX: 33.25 KG/M2 | DIASTOLIC BLOOD PRESSURE: 73 MMHG | SYSTOLIC BLOOD PRESSURE: 117 MMHG | WEIGHT: 206.9 LBS | HEIGHT: 66 IN

## 2024-01-18 VITALS
TEMPERATURE: 98.7 F | SYSTOLIC BLOOD PRESSURE: 117 MMHG | BODY MASS INDEX: 33.39 KG/M2 | WEIGHT: 206.9 LBS | OXYGEN SATURATION: 95 % | DIASTOLIC BLOOD PRESSURE: 73 MMHG | HEART RATE: 90 BPM | RESPIRATION RATE: 16 BRPM

## 2024-01-18 DIAGNOSIS — C34.31 MALIGNANT NEOPLASM OF LOWER LOBE OF RIGHT LUNG (H): Primary | ICD-10-CM

## 2024-01-18 DIAGNOSIS — Z01.818 PRE-OP EVALUATION: Primary | ICD-10-CM

## 2024-01-18 DIAGNOSIS — C34.31 MALIGNANT NEOPLASM OF LOWER LOBE OF RIGHT LUNG (H): ICD-10-CM

## 2024-01-18 LAB
ALBUMIN SERPL BCG-MCNC: 4.4 G/DL (ref 3.5–5.2)
ALP SERPL-CCNC: 101 U/L (ref 40–150)
ALT SERPL W P-5'-P-CCNC: 9 U/L (ref 0–50)
ANION GAP SERPL CALCULATED.3IONS-SCNC: 10 MMOL/L (ref 7–15)
AST SERPL W P-5'-P-CCNC: 17 U/L (ref 0–45)
BASOPHILS # BLD AUTO: 0.1 10E3/UL (ref 0–0.2)
BASOPHILS NFR BLD AUTO: 1 %
BILIRUB SERPL-MCNC: 0.2 MG/DL
BUN SERPL-MCNC: 5.7 MG/DL (ref 8–23)
CALCIUM SERPL-MCNC: 9.4 MG/DL (ref 8.8–10.2)
CHLORIDE SERPL-SCNC: 104 MMOL/L (ref 98–107)
CREAT SERPL-MCNC: 0.61 MG/DL (ref 0.51–0.95)
DEPRECATED HCO3 PLAS-SCNC: 30 MMOL/L (ref 22–29)
EGFRCR SERPLBLD CKD-EPI 2021: >90 ML/MIN/1.73M2
EOSINOPHIL # BLD AUTO: 0.6 10E3/UL (ref 0–0.7)
EOSINOPHIL NFR BLD AUTO: 9 %
ERYTHROCYTE [DISTWIDTH] IN BLOOD BY AUTOMATED COUNT: 13.5 % (ref 10–15)
GLUCOSE SERPL-MCNC: 93 MG/DL (ref 70–99)
HCT VFR BLD AUTO: 44.4 % (ref 35–47)
HGB BLD-MCNC: 13.9 G/DL (ref 11.7–15.7)
IMM GRANULOCYTES # BLD: 0 10E3/UL
IMM GRANULOCYTES NFR BLD: 0 %
LYMPHOCYTES # BLD AUTO: 2.5 10E3/UL (ref 0.8–5.3)
LYMPHOCYTES NFR BLD AUTO: 36 %
MCH RBC QN AUTO: 27.4 PG (ref 26.5–33)
MCHC RBC AUTO-ENTMCNC: 31.3 G/DL (ref 31.5–36.5)
MCV RBC AUTO: 87 FL (ref 78–100)
MONOCYTES # BLD AUTO: 0.6 10E3/UL (ref 0–1.3)
MONOCYTES NFR BLD AUTO: 9 %
NEUTROPHILS # BLD AUTO: 3.1 10E3/UL (ref 1.6–8.3)
NEUTROPHILS NFR BLD AUTO: 45 %
NRBC # BLD AUTO: 0 10E3/UL
NRBC BLD AUTO-RTO: 0 /100
PLATELET # BLD AUTO: 232 10E3/UL (ref 150–450)
POTASSIUM SERPL-SCNC: 3.3 MMOL/L (ref 3.4–5.3)
PROT SERPL-MCNC: 7.4 G/DL (ref 6.4–8.3)
RBC # BLD AUTO: 5.08 10E6/UL (ref 3.8–5.2)
SODIUM SERPL-SCNC: 144 MMOL/L (ref 135–145)
WBC # BLD AUTO: 6.9 10E3/UL (ref 4–11)

## 2024-01-18 PROCEDURE — 99215 OFFICE O/P EST HI 40 MIN: CPT | Performed by: NURSE PRACTITIONER

## 2024-01-18 PROCEDURE — 36415 COLL VENOUS BLD VENIPUNCTURE: CPT | Performed by: PATHOLOGY

## 2024-01-18 PROCEDURE — 80053 COMPREHEN METABOLIC PANEL: CPT | Performed by: PATHOLOGY

## 2024-01-18 PROCEDURE — 99215 OFFICE O/P EST HI 40 MIN: CPT | Performed by: INTERNAL MEDICINE

## 2024-01-18 PROCEDURE — 85025 COMPLETE CBC W/AUTO DIFF WBC: CPT | Performed by: PATHOLOGY

## 2024-01-18 PROCEDURE — G0463 HOSPITAL OUTPT CLINIC VISIT: HCPCS | Performed by: INTERNAL MEDICINE

## 2024-01-18 PROCEDURE — G2211 COMPLEX E/M VISIT ADD ON: HCPCS | Performed by: INTERNAL MEDICINE

## 2024-01-18 ASSESSMENT — PAIN SCALES - GENERAL
PAINLEVEL: MODERATE PAIN (5)
PAINLEVEL: WORST PAIN (10)

## 2024-01-18 ASSESSMENT — LIFESTYLE VARIABLES: TOBACCO_USE: 1

## 2024-01-18 ASSESSMENT — COPD QUESTIONNAIRES: COPD: 1

## 2024-01-18 NOTE — CONSULTS
Anesthesia Consult Note      Reason for consult:   High Risk consult  No diagnosis found.      Date of Encounter: 1/18/2024  Referring Physician: Zarina Leung  Primary Care Physician:  Mitzi Nettles  Jenn Aparicio is a 68 year old who is being seen in our clinic for high risk consult due to complex past medical history. The patient presents to the PAC in person today with past medical history including NSCLC RUL, COPD/emphysema oxygen dependent, untreated DAVID,  GERD,DM II and neuropathy.    The patient was seen earlier today by her oncologist, Dr. Leung, in follow up for her NSCLC RUL.  PET/CT with report pending.  This visit was scheduled in the event the imaging shows something for needing possible bronchoscopy.          History of bleeding/coagulopathy  ASA 81 mg     History of anesthesia issues in patient or 1st degree relative  No previous issues with anesthesia for the patient or a first degree relative    History is obtained from the patient and chart review.     Past Medical History  Past Medical History:   Diagnosis Date    Adenocarcinoma, lung (H)     Asthma     Ectopic pregnancy     Esophageal reflux     Pulmonary emphysema (H)     Very severe FEV1<30% predicted    Type II diabetes mellitus (H)        Past Surgical History  Past Surgical History:   Procedure Laterality Date    2/8/16                R thoracotomy, RLL lobectomy (Dr. Cunha). Adenocarcinoma, 1.1 cm, assoicated with atypical adenomatous hyperplasia Right 02/08/2016    R thoracotomy, RLL lobectomy (Dr. Cunha). Adenocarcinoma, 1.1 cm, assoicated with atypical adenomatous hyperplasia    BRONCHOSCOPY, WITH BIOPSY, ROBOT ASSISTED N/A 7/19/2023    Procedure: robot assisted Ion BRONCHOSCOPY, WITH BIOPSY;  Surgeon: Patria Garcia MD;  Location: [facility] OR    ENDOBRONCHIAL ULTRASOUND FLEXIBLE N/A 7/19/2023    Procedure: Endobronchial ultrasound flexible;  Surgeon: Patria Garcia MD;  Location: [facility] OR    ESOPHAGOSCOPY, GASTROSCOPY,  DUODENOSCOPY (EGD), COMBINED N/A 8/16/2022    Procedure: ESOPHAGOGASTRODUODENOSCOPY (EGD);  Surgeon: Travis Briseno MD;  Location:  GI    ESOPHAGOSCOPY, GASTROSCOPY, DUODENOSCOPY (EGD), COMBINED N/A 8/8/2023    Procedure: ESOPHAGOGASTRODUODENOSCOPY, WITH BIOPSY;  Surgeon: Chao Rodrigues DO;  Location:  GI    ORTHOPEDIC SURGERY      PHACOEMULSIFICATION CLEAR CORNEA WITH STANDARD INTRAOCULAR LENS IMPLANT Left 8/24/2023    Procedure: LEFT EYE PHACOEMULSIFICATION, CATARACT, WITH INTRAOCULAR LENS IMPLANT;  Surgeon: Re Keita MD;  [place] [facility] [place]    PHACOEMULSIFICATION CLEAR CORNEA WITH STANDARD INTRAOCULAR LENS IMPLANT Right 9/5/2023    Procedure: RIGHT EYE PHACOEMULSIFICATION, CATARACT, WITH INTRAOCULAR LENS IMPLANT;  Surgeon: Re Keita MD;  [place] [facility] [place]       Prior to Admission Medications  Current Outpatient Medications   Medication Sig Dispense Refill    acetylcysteine (MUCOMYST) 10 % nebulizer solution Inhale 4 mLs into the lungs 4 times daily (Patient taking differently: Inhale 4 mLs into the lungs 4 times daily as needed) 480 mL 0    albuterol (PROAIR HFA/PROVENTIL HFA/VENTOLIN HFA) 108 (90 Base) MCG/ACT inhaler USE 1 OR 2 INHALATIONS EVERY 4 HOURS AS NEEDED (Patient taking differently: Inhale 1 puff into the lungs every 4 hours as needed USE 1 OR 2 INHALATIONS EVERY 4 HOURS AS NEEDED) 17 g 3    albuterol (PROVENTIL) (2.5 MG/3ML) 0.083% neb solution Take 1 vial (2.5 mg) by nebulization 4 times daily (Patient taking differently: Take 2.5 mg by nebulization 4 times daily as needed) 360 mL 0    alpha-lipoic acid 600 MG capsule Take 1 capsule (600 mg) by mouth daily (Patient taking differently: Take 600 mg by mouth every morning) 90 capsule 3    amLODIPine (NORVASC) 10 MG tablet Take 1 tablet (10 mg) by mouth daily (Patient taking differently: Take 10 mg by mouth every evening) 90 tablet 3    aspirin 81 MG EC tablet Take 81 mg by mouth every morning      buPROPion (WELLBUTRIN XL) 150 MG 24 hr  tablet Take 1 tablet (150 mg) by mouth every morning 14 tablet 0    capsaicin (ZOSTRIX) 0.025 % external cream Apply a thin amount topically 3 times daily as needed (neuropathy in feet.) 25 g 11    carboxymethylcellulose (REFRESH LIQUIGEL) 1 % ophthalmic solution Apply 1 drop to eye 4 times daily 15 mL 4    cetirizine (ZYRTEC) 10 MG tablet Take 1 tablet (10 mg) by mouth every morning 30 tablet 11    Continuous Blood Gluc  (DEXCOM G7 ) JOSEPHINE Use to read blood sugars as per 's instructions. 1 each 0    Continuous Blood Gluc Sensor (DEXCOM G7 SENSOR) MISC Change every 10 days. 3 each 5    Continuous Blood Gluc Sensor (FREESTYLE GIOVANNI 2 SENSOR) MISC 1 each every 14 days For use with Freestyle Giovanni 2  for continuous monitioring of blood glucose levels. Replace sensor every 14 days. 2 each 11    cyanocobalamin (VITAMIN B-12) 500 MCG tablet Take 1 tablet (500 mcg) by mouth daily (Patient taking differently: Take 500 mcg by mouth every morning) 90 tablet 1    empagliflozin (JARDIANCE) 25 MG TABS tablet Take 1 tablet (25 mg) by mouth daily (Patient taking differently: Take 25 mg by mouth every morning) 90 tablet 0    EPINEPHrine (ANY BX GENERIC EQUIV) 0.3 MG/0.3ML injection 2-pack Inject 0.3 mLs (0.3 mg) into the muscle as needed for anaphylaxis (related to bee stings) May repeat one time in 5-15 minutes if response to initial dose is inadequate. 2 each 1    ezetimibe (ZETIA) 10 MG tablet Take 1 tablet (10 mg) by mouth daily (Patient taking differently: Take 10 mg by mouth every morning) 90 tablet 3    fluticasone (FLONASE) 50 MCG/ACT nasal spray Spray 1 spray into both nostrils 2 times daily Use at night before bed 15.8 mL 3    Fluticasone-Umeclidin-Vilanterol (TRELEGY ELLIPTA) 200-62.5-25 MCG/ACT oral inhaler USE 1 INHALATION DAILY (Patient taking differently: 1 puff every morning USE 1 INHALATION DAILY) 28 each 5    GLUCOSAMINE-CHONDROITIN -400 MG tablet TAKE 1 TABLET DAILY  (Patient taking differently: Take 1 tablet by mouth every evening) 90 tablet 3    ipratropium - albuterol 0.5 mg/2.5 mg/3 mL (DUONEB) 0.5-2.5 (3) MG/3ML neb solution Take 1 vial (3 mLs) by nebulization every 6 hours as needed for shortness of breath, wheezing or cough 90 mL 1    nicotine (NICODERM CQ) 14 MG/24HR 24 hr patch Place 1 patch onto the skin every 24 hours      olopatadine (PATADAY) 0.2 % ophthalmic solution Place 0.05 mLs (1 drop) into both eyes daily 2.5 mL 11    omega-3 acid ethyl esters (LOVAZA) 1 g capsule Take 2 capsules (2 g) by mouth 2 times daily (Patient taking differently: Take 1 g by mouth 2 times daily Take 2 capsules (2 g) by mouth 2 times daily) 360 capsule 3    omeprazole (PRILOSEC) 40 MG DR capsule Take 1 capsule (40 mg) by mouth 2 times daily for 180 days 180 capsule 1    pregabalin (LYRICA) 150 MG capsule TAKE 1 CAPSULE(150 MG) BY MOUTH TWICE DAILY (Patient taking differently: Take 150 mg by mouth 2 times daily TAKE 1 CAPSULE(150 MG) BY MOUTH TWICE DAILY) 270 capsule 1    roflumilast (DALIRESP) 500 MCG TABS tablet Take 1 tablet (500 mcg) by mouth daily (Patient taking differently: Take 500 mcg by mouth every morning) 90 tablet 3    dulaglutide (TRULICITY) 0.75 MG/0.5ML pen Inject 0.75 mg Subcutaneous every 7 days (Patient not taking: Reported on 1/18/2024) 2 mL 1    insulin pen needle (BD MARCIO U/F) 32G X 4 MM miscellaneous Use 1 pen needle daily or as directed. 100 each 1    STATIN NOT PRESCRIBED (INTENTIONAL) Please choose reason not prescribed from choices below.         Menstrual history: No LMP recorded. Patient is postmenopausal.:      Allergies  Allergies   Allergen Reactions    Aspirin      325mg     Bee Venom Anaphylaxis    Penicillins Hives    Azithromycin Dizziness    Colon Care     Interferons Dermatitis       Social History  Social History     Socioeconomic History    Marital status: Single     Spouse name: Not on file    Number of children: Not on file    Years of education:  Not on file    Highest education level: Not on file   Occupational History    Not on file   Tobacco Use    Smoking status: Former     Packs/day: .25     Types: Cigarettes     Quit date:      Years since quittin.0     Passive exposure: Past    Smokeless tobacco: Never    Tobacco comments:     2023 Patient using 14 mg patch, wants to have prescription for Nicotrol inhaler, took workbook   Substance and Sexual Activity    Alcohol use: Not Currently    Drug use: No    Sexual activity: Not Currently     Partners: Male   Other Topics Concern    Not on file   Social History Narrative    Ms. Aparicio lives in an apartment complex by herself as of May 2022. She has frequent smoke exposure from neighbors even when she is not smoking herself.      Social Determinants of Health     Financial Resource Strain: Not on file   Food Insecurity: Not on file   Transportation Needs: Not on file   Physical Activity: Not on file   Stress: Not on file   Social Connections: Not on file   Interpersonal Safety: Not At Risk (3/1/2023)    Humiliation, Afraid, Rape, and Kick questionnaire     Fear of Current or Ex-Partner: No     Emotionally Abused: No     Physically Abused: No     Sexually Abused: No   Housing Stability: Not on file       Family History  Family History   Problem Relation Age of Onset    Thyroid Disease Mother     Cerebrovascular Disease Mother     Hypertension Mother     Hypertension Father     Glaucoma Father     Cancer Sister     Lung Cancer Sister     Diabetes Brother     Cancer Brother     Diabetes Brother     Cancer Brother     Diabetes Brother     Deep Vein Thrombosis (DVT) Daughter     Depression Daughter     Alcohol/Drug Other         self    Diabetes Other         self    Thyroid Disease Other         self    Asthma Other         self    Macular Degeneration No family hx of     Anesthesia Reaction No family hx of        The complete review of systems is negative other than noted in the HPI or here.  "Anesthesia Evaluation   Pt has had prior anesthetic. Type: General and MAC.    No history of anesthetic complications       ROS/MED HX  ENT/Pulmonary:     (+) sleep apnea, doesn't use CPAP,              tobacco use (Quit 2023), Past use,         COPD (COPD excerbation ~2 months ago treated with antibiotics and oral steroid.), O2 dependent, during Both, 1.5-3.0 liters/min,           Neurologic:     (+)    peripheral neuropathy, - feet.                           Cardiovascular:     (+) Dyslipidemia hypertension- -   -  - -   Taking blood thinners                              Previous cardiac testing   Echo: Date: 2022 Results:    Stress Test:  Date: Results:    ECG Reviewed:  Date: Results:    Cath:  Date: Results:      METS/Exercise Tolerance:     Hematologic:     (+)       history of blood transfusion, no previous transfusion reaction,  - In setting of pregnancy,   (-) history of blood clots   Musculoskeletal: Comment: Right ankle surgery a number of years.       GI/Hepatic:     (+) GERD, Asymptomatic on medication,   hepatitis resolved      hepatitis type C,        Renal/Genitourinary:       Endo:     (+)  type II DM (Uses a DexCom BG monitor.), Last HgA1c: 8.1, date: 11/2023, Not using insulin, - not using insulin pump.   Diabetic complications: neuropathy.      Obesity,    (-) thyroid disease and chronic steroid usage   Psychiatric/Substance Use:  - neg psychiatric ROS  (-) psychiatric history and alcohol abuse history   Infectious Disease:  - neg infectious disease ROS     Malignancy:   (+) Malignancy, History of Lung.  Lung CA status post Surgery and Radiation.      Other: Comment: Uses a wheeled  walker             /73   Pulse 90   Temp 98.7  F (37.1  C)   Ht 1.676 m (5' 6\")   Wt 93.8 kg (206 lb 14.4 oz)   SpO2 96%   BMI 33.39 kg/m      Physical Exam   Constitutional: Awake, alert, cooperative, no apparent distress, and appears stated age.  Eyes: Pupils equal, round and reactive to light, extra " ocular muscles intact, sclera clear, conjunctiva normal.  HENT: Normocephalic, oral pharynx with moist mucus membranes, upper edentulous with only front four stubs present on bottom. No goiter appreciated.   Respiratory: Clear to auscultation bilaterally, no crackles or wheezing.  Cardiovascular: Regular rate and rhythm, normal S1 and S2, and no murmur noted.  Carotids +2, no bruits. No edema. Palpable pulses to radial  DP and PT arteries.   GI: Normal bowel sounds, soft, non-distended, non-tender, no masses palpated, no hepatosplenomegaly.   Lymph/Hematologic: No cervical lymphadenopathy and no supraclavicular lymphadenopathy.  Skin: Warm and dry.    Musculoskeletal: Full ROM of neck. There is no redness, warmth, or swelling of the joints. Gross motor strength is normal.    Neurologic: Awake, alert, oriented to name, place and time. Cranial nerves II-XII are grossly intact. Gait is normal.   Neuropsychiatric: Calm, cooperative. Normal affect.     Labs / testing: (personally reviewed)   Latest Reference Range & Units 11/27/23 08:33   Sodium 135 - 145 mmol/L 143   Potassium 3.4 - 5.3 mmol/L 4.4   Chloride 98 - 107 mmol/L 103   Carbon Dioxide (CO2) 22 - 29 mmol/L 31 (H)   Urea Nitrogen 8.0 - 23.0 mg/dL 12.1   Creatinine 0.51 - 0.95 mg/dL 0.59   GFR Estimate >60 mL/min/1.73m2 >90   Calcium 8.8 - 10.2 mg/dL 9.3   Anion Gap 7 - 15 mmol/L 9   Albumin 3.5 - 5.2 g/dL 4.3   Protein Total 6.4 - 8.3 g/dL 7.3   Alkaline Phosphatase 40 - 150 U/L 105   ALT 0 - 50 U/L 8   AST 0 - 45 U/L 17   Bilirubin Total <=1.2 mg/dL 0.5   Cholesterol <200 mg/dL 166   Glucose 70 - 99 mg/dL 142 (H)   HDL Cholesterol >=50 mg/dL 76   Hemoglobin A1C <5.7 % 8.1 (H)   LDL Cholesterol Calculated <=100 mg/dL 77   Non HDL Cholesterol <130 mg/dL 90   Triglycerides <150 mg/dL 64   WBC 4.0 - 11.0 10e3/uL 11.5 (H)   Hemoglobin 11.7 - 15.7 g/dL 14.2   Hematocrit 35.0 - 47.0 % 45.5   Platelet Count 150 - 450 10e3/uL 188   RBC Count 3.80 - 5.20 10e6/uL 5.15    MCV 78 - 100 fL 88   MCH 26.5 - 33.0 pg 27.6   MCHC 31.5 - 36.5 g/dL 31.2 (L)   RDW 10.0 - 15.0 % 13.7   % Neutrophils % 76   % Lymphocytes % 12   % Monocytes % 7   % Eosinophils % 5   % Basophils % 0   Absolute Basophils 0.0 - 0.2 10e3/uL 0.1   Absolute Eosinophils 0.0 - 0.7 10e3/uL 0.6   Absolute Immature Granulocytes <=0.4 10e3/uL 0.0   Absolute Lymphocytes 0.8 - 5.3 10e3/uL 1.4   Absolute Monocytes 0.0 - 1.3 10e3/uL 0.8   % Immature Granulocytes % 0   Absolute Neutrophils 1.6 - 8.3 10e3/uL 8.6 (H)   Absolute NRBCs 10e3/uL 0.0   NRBCs per 100 WBC <1 /100 0   (H): Data is abnormally high  (L): Data is abnormally low      PROCEDURES  EKG  Sinus tachycardia   Septal infarct , age undetermined   Abnormal ECG   Unconfirmed report - interpretation of this ECG is computer generated - see medical record for final interpretation   Confirmed by - EMERGENCY ROOM, PHYSICIAN ([id number]),  JAYNE AGUILAR ([id number]) on 2/20/2023       ECHO 9/2022  Echocardiogram with two-dimensional, color and spectral Doppler performed.  ______________________________________________________________________________  Interpretation Summary  Left ventricular size, wall motion and function are normal. The ejection  fraction is 55-60%.     Right ventricular function, chamber size, wall motion, and thickness are  normal.     No pericardial effusion is present.     The inferior vena cava is normal.     No significant valvular abnormalities present.     There is no prior study for direct comparison.    PFTs 4/2023  The FVC, FEV1 and FEV1/FVC ratio are reduced.   The inspiratory flow rates are reduced.  The slow vital capacity is reduced.  The diffusing capacity is reduced.  However, the diffusing capacity was not corrected for the patient's hemoglobin.      IMPRESSION:    Severe Airflow Obstruction    Severe diffusion defect.    The diffusing capacity was not corrected for the patient's hemoglobin.    The FVC is also decreased which may suggest a  concomittant restrictive defect or air trapping, lung volumes would be needed to confirm.    Compared with testing from  12/15/2020 there has been no significant change in the FEV1 or FVC.    Ann Mckeon MD       Outside records reviewed from:  Care Everywhere    Assessment    Jenn Aparicio is a 68 year old female seen as a PAC referral for risk assessment and optimization for anesthesia.    Plan/Recommendations  Pt will be optimized for the proposed procedure.  See below for details on the assessment, risk, and preoperative recommendations    NEUROLOGY  - No history of TIA, CVA or seizure    - Neuropathy   ~ pregabalin and capsaicin cream     -Post Op delirium risk factors:  No risk identified    ENT  Airway: caution on remaining four incisors on bottom >>currently in very poor repair   Mallampati: I  TM: > 3    - Allergic Rhinnitis   ~ cetirizine, flonase and olopatadine eye drops    CARDIAC  - No history of CAD and Afib    - HTN   ~ amlodipine continue uninterrupted    - Dyslipidemia   ~ continue Zetia uninterrupted     - RCRI-Very low risk: Class 1 0.4% complication rate            Total Score: 0        PULMONARY  - Lung adenocarcinoma /NSCLC RUL Dx 2015 s/p R thoracotomy,  RLL lobectomy and radiation.    ~ seen by Dr. Leung today with PET/CT with results pending.    ~ PAC consult ordered in the event the patient would nee to proceed to bronchoscopy.          - Untreated DAVID    - COPD>>reports being stable   ~ Last exacerbation 2 months ago   ~ Trelegy Ellipta inhaler as prescribed   ~ roflumilast daily    ~ On home oxygen 1.5-3 L   ~ PFTs FVC 1.36L 46%  FEV1 0.55L, 24%    - Tobacco History   ~ continues on Wellbutrin for smoking cessation     History   Smoking Status    Former    Packs/day: 0.25    Types: Cigarettes    Quit date: 2023   Smokeless Tobacco    Never       GI  - GERD   ~ Controlled on medications: Proton Pump Inhibitor    - PONV Medium Risk  Total Score: 2           1 AN PONV: Pt is Female     "1 AN PONV: Patient is not a current smoker      - h/o Hep C s/p treatment     /RENAL  - Baseline Creatinine  see above     ENDOCRINE   - BMI: Estimated body mass index is 33.39 kg/m  as calculated from the following:    Height as of this encounter: 1.676 m (5' 6\").    Weight as of this encounter: 93.8 kg (206 lb 14.4 oz).  Obesity (BMI >30)      - Diabetes Mellitus, Type 2, non-insulin dependent.    ~ A1C 8.1  ~ Dexcom monitoring device  ~ Jardiance - hold for 3 days prior to surgery  ~ Prescribed Trulicity but has not started   ~  Recommend close monitoring of the patient's blood glucose levels throughout the perioperative period.    HEME  - VTE Low Risk 0.26%            Total Score: 1    VTE: Greater than 59 yrs old      - Platelet dysfunction second to Aspirin (Ramon, many others)   ~ hold ASA for 7 days prior    Hemoglobin   Date Value Ref Range Status   01/18/2024 13.9 11.7 - 15.7 g/dL Final   06/25/2021 12.9 11.7 - 15.7 g/dL Final   ]  - h/o blood transfusion in setting of pregnancy      The patient is optimized and acceptable candidate for proposed procedure. Arrival time, NPO, shower and medication instructions provided by nursing staff today.      On the day of service:     Prep time: 18 minutes  Visit time: 20 minutes  Documentation time: 20 minutes  ------------------------------------------  Total time: 58 minutes    BENOIT Wills CNP    Preoperative Assessment Center  [facility]  Office phone: [phone]  Fax: [phone]    "

## 2024-01-18 NOTE — LETTER
1/18/2024         RE: Jenn Aparicio  [address]  [place] [address]        Dear Colleague,    Thank you for referring your patient, Jenn Aparicio, to the [facility]. Please see a copy of my visit note below.       [facility]    PATIENT NAME: Jenn Aparicio  MRN # [id number]   DATE OF VISIT: January 18, 2024  YOB: 1955     CANCER TYPE: Lung adenocarcinoma  STAGE: IA2 [id number] poorly diff adeno RLL, then [id number] IA2 suspected NSCLC RUL, medically inoperable     ECOG PS: 2 (COPD)    PD-L1: N/A  Lung panel: N/A  NGS: N/A    SUMMARY  12/24/15 PET/CT  1/13/15 CT lung bx. Adenocarcinoma  2/8/16 R thoracotomy, RLL lobectomy (Dr. Cunha). Adenocarcinoma, 1.1 cm, assoicated with atypical adenomatous hyperplasia  10/18/19 CTA for shortness of breath. New 1.3 cm RUL nodule  11/2019 PET/CT. 1.1 cm RUL hypermetabolic nodule (SUV 12.6)  12/26/19 Brain MRI negative for mets  1/14~1/28/20 SBRT to RUL nodule, 5000 cGy, every other day, 1000 [place] [facility] [place] bx due to O2 dependence and too much risk  8/19/20 First visit with me   11/29/21 CT chest. New 10 mm RUL nodule  1/11/22 CT chest. New 7.2 mm RUL nodule, unchanged RUL nodules  3/31/22 CT chest. New RUL nodule from Jan 2022 7-->10 mm, new 5 mm ROBERT nodule  5/20~5/24/22 [facility] for COPD exacerbation.   6/24/22 CT chest non contrast.   8/16/22 EGD. 3 cm hiatal hernia, o/w normal  8/31/22 CT chest. 2.5 x 1.3 cm R midlung subpleural nodularity (4:98) previously 2.3 x 1.1 cm, slightly increased solid component   10/5/22 PET/CT. 2 x 1.3 cm irregular opacity posterior RUL (SUV 2.46), 2.4 x 0.8 cm linear opacity (SUV 4.76); there was bronchiectasia on prior imaging. Stable 1.1 x 0.8 cm non FDG avid RUL opacity and unchanged 4 mm posterior RUL nodule. No adenopathy. Inferior right breast uptake (SUV 6.09) likely infectious or inflammatory.   2/3~2/9/23 [facility] for COPD exacerbation  2/4/23 CTA during  hospitalization. No significant change in 2.3 x 1.5 cm spiculated nodule in the right upper lobe (series 7 image 94) with surrounding linear parenchymal opacities and subtle nodularity  2/20~2/24/23 [facility] for COPD exacerbation.   3/1/23 CT chest non contrast. 2.3 cm posterior RUL irregular nodule unchanged from 12/1/22 however slightly increased soft tissue component compared to 10/5/22 PET. Stable ROBERT mucous plugging with micronodularity.  3/22/23 CT chest. Stable compared to 3/1/23  5/25/23 CT chest abd w/contrast. Unchanged 1.3 x 0.5 cm R apical nodule, unchnaged 2.4 x 1.3 cm R upper nodular consolidation. New nodular/masslike area of consolidation anteriorly and laterally measuring 3.3 x 1.4 cm, not present on CT 3/22/23. No adenopathy. Consider PET or close surveillance CT in 3 months  7/19/23 Bronch, EBUS (Dr. Garcia). 11L negative, scant cells. 4L unsatisfactory for eval. 8 negative, scant cellularity, 4R unsatisfactory, 11R negative, scan cellularity, RUL negative, showed acute inflammation, limited by scant cellularity.    ASSESSMENT AND PLAN  NSCLC, adeno, RUL: See extensive prior notes. RUL peripheral lesion looks a little better than last CT, supporting the notion that some of it was inflammatory from prior infection, likely transient bronchial obstruction from mucus plugging, etc. Report not back yet. I'll let her know if anything materially changes. Repeat CT CAP in 3-4 months would be appropriate, especially given the difficulty of treatment should we pathologically prove recurrence and given the risk of anesthesia to get there. Will hold on bronchoscopy for now.     ADDENDUM 1/19: PET report reviewed. Read as remaining concerning for recurrence but SUV 3.97 compared to 7.9 before. No change in plan. Will plan to call Jenn on Monday.    COPD: Remains O2 dependent with exacerbations throughout the year, usually better in warmer months, stable since last visit.     Deconditioning: Getting a little worse  over the last 6 months, but not in a position to do PT and not homebound.     DM2: Seeing Dr. Shabazz. Recently started ozempic, causing nausea, changes made, waiting for meds, sent message to her team as Jenn hasn't heard anything about when she'll be able to get them. Explained likely a prior auth issue, etc.     Dysphagia: Met with Dr. Briseno in GI 7/18/23, has had pretty extensive evaluation before. Next step is video swallow, possibly EGD if video swallow negative. No abnormalities in the head/neck are on PET 6/30/23, which is reassuring from a cancer perspective. Not discussed today.     Peripheral neuropathy: Not discussed today. Mostly driven by DM    40 minutes spent by me on the date of the encounter doing chart review, history and exam, documentation and further activities per the note     Zarina Leung MD  Associate Professor of Medicine  Hematology, Oncology and Transplantation      SUBJECTIVE  Jenn returns for close interim follow up.     Med changes - waiting for new one  O/w no new symptoms    PAST MEDICAL HISTORY  Lung adenocarcinoma  DM2 with neuropathy  HCV IA, undetectable in 2019, treated  COPD. O2 dependent since late 2018. PFT 11/26/19. FEV1 0.64 (30%), FVC 1.69 (63%), DLCOunc 9.8 (38%).  HTN  H/o ITP  H/o disk herniation  Ankle surgery  Median neuropathy R  H/o sessile colon polyps 2014, h/o adenomas before that. [place] [facility] [place] Sessile serated adenoma x 1, tubular adenoma x 3  H/o chronic LBP  Dyslipidemia  Cataracts    CURRENT OUTPATIENT MEDICATIONS  Reviewed    ALLERGIES  Allergies   Allergen Reactions    Aspirin      325mg     Bee Venom Anaphylaxis    Penicillins Hives    Azithromycin Dizziness    Colon Care     Interferons Dermatitis      PHYSICAL EXAM  /73   Pulse 90   Temp 98.7  F (37.1  C)   Resp 16   Wt 93.8 kg (206 lb 14.4 oz)   SpO2 95%   BMI 33.39 kg/m    GEN: NAD  HEENT: EOMI, no icterus, injection or pallor  EXT: no edema  NEURO: alert    LABORATORY AND  IMAGING STUDIES    PET/CT 1/17/24 was personally reviewed and interpreted by deana Leung MD

## 2024-01-18 NOTE — PROGRESS NOTES
[facility]    PATIENT NAME: Jenn Aparicio  MRN # [id number]   DATE OF VISIT: January 18, 2024  YOB: 1955     CANCER TYPE: Lung adenocarcinoma  STAGE: IA2 [id number] poorly diff adeno RLL, then [id number] IA2 suspected NSCLC RUL, medically inoperable     ECOG PS: 2 (COPD)    PD-L1: N/A  Lung panel: N/A  NGS: N/A    SUMMARY  12/24/15 PET/CT  1/13/15 CT lung bx. Adenocarcinoma  2/8/16 R thoracotomy, RLL lobectomy (Dr. Cunha). Adenocarcinoma, 1.1 cm, assoicated with atypical adenomatous hyperplasia  10/18/19 CTA for shortness of breath. New 1.3 cm RUL nodule  11/2019 PET/CT. 1.1 cm RUL hypermetabolic nodule (SUV 12.6)  12/26/19 Brain MRI negative for mets  1/14~1/28/20 SBRT to RUL nodule, 5000 cGy, every other day, 1000 cGy ([place] [facility] [place] No bx due to O2 dependence and too much risk  8/19/20 First visit with me   11/29/21 CT chest. New 10 mm RUL nodule  1/11/22 CT chest. New 7.2 mm RUL nodule, unchanged RUL nodules  3/31/22 CT chest. New RUL nodule from Jan 2022 7-->10 mm, new 5 mm ROBERT nodule  5/20~5/24/22 [facility] for COPD exacerbation.   6/24/22 CT chest non contrast.   8/16/22 EGD. 3 cm hiatal hernia, o/w normal  8/31/22 CT chest. 2.5 x 1.3 cm R midlung subpleural nodularity (4:98) previously 2.3 x 1.1 cm, slightly increased solid component   10/5/22 PET/CT. 2 x 1.3 cm irregular opacity posterior RUL (SUV 2.46), 2.4 x 0.8 cm linear opacity (SUV 4.76); there was bronchiectasia on prior imaging. Stable 1.1 x 0.8 cm non FDG avid RUL opacity and unchanged 4 mm posterior RUL nodule. No adenopathy. Inferior right breast uptake (SUV 6.09) likely infectious or inflammatory.   2/3~2/9/23 [facility] for COPD exacerbation  2/4/23 CTA during hospitalization. No significant change in 2.3 x 1.5 cm spiculated nodule in the right upper lobe (series 7 image 94) with surrounding linear parenchymal opacities and subtle nodularity  2/20~2/24/23 [facility] for COPD exacerbation.   3/1/23 CT chest non contrast. 2.3 cm  posterior RUL irregular nodule unchanged from 12/1/22 however slightly increased soft tissue component compared to 10/5/22 PET. Stable ROBERT mucous plugging with micronodularity.  3/22/23 CT chest. Stable compared to 3/1/23  5/25/23 CT chest abd w/contrast. Unchanged 1.3 x 0.5 cm R apical nodule, unchnaged 2.4 x 1.3 cm R upper nodular consolidation. New nodular/masslike area of consolidation anteriorly and laterally measuring 3.3 x 1.4 cm, not present on CT 3/22/23. No adenopathy. Consider PET or close surveillance CT in 3 months  7/19/23 Bronch, EBUS (Dr. Garcia). 11L negative, scant cells. 4L unsatisfactory for eval. 8 negative, scant cellularity, 4R unsatisfactory, 11R negative, scan cellularity, RUL negative, showed acute inflammation, limited by scant cellularity.    ASSESSMENT AND PLAN  NSCLC, adeno, RUL: See extensive prior notes. RUL peripheral lesion looks a little better than last CT, supporting the notion that some of it was inflammatory from prior infection, likely transient bronchial obstruction from mucus plugging, etc. Report not back yet. I'll let her know if anything materially changes. Repeat CT CAP in 3-4 months would be appropriate, especially given the difficulty of treatment should we pathologically prove recurrence and given the risk of anesthesia to get there. Will hold on bronchoscopy for now.     ADDENDUM 1/19: PET report reviewed. Read as remaining concerning for recurrence but SUV 3.97 compared to 7.9 before. No change in plan. Will plan to call Jenn on Monday.    COPD: Remains O2 dependent with exacerbations throughout the year, usually better in warmer months, stable since last visit.     Deconditioning: Getting a little worse over the last 6 months, but not in a position to do PT and not homebound.     DM2: Seeing Dr. Shabazz. Recently started ozempic, causing nausea, changes made, waiting for meds, sent message to her team as Jenn hasn't heard anything about when she'll be able to get  them. Explained likely a prior auth issue, etc.     Dysphagia: Met with Dr. Briseno in GI 7/18/23, has had pretty extensive evaluation before. Next step is video swallow, possibly EGD if video swallow negative. No abnormalities in the head/neck are on PET 6/30/23, which is reassuring from a cancer perspective. Not discussed today.     Peripheral neuropathy: Not discussed today. Mostly driven by DM    40 minutes spent by me on the date of the encounter doing chart review, history and exam, documentation and further activities per the note     Zarina Leung MD  Associate Professor of Medicine  Hematology, Oncology and Transplantation      SUBJECTIVE  Jenn returns for close interim follow up.     Med changes - waiting for new one  O/w no new symptoms    PAST MEDICAL HISTORY  Lung adenocarcinoma  DM2 with neuropathy  HCV IA, undetectable in 2019, treated  COPD. O2 dependent since late 2018. PFT 11/26/19. FEV1 0.64 (30%), FVC 1.69 (63%), DLCOunc 9.8 (38%).  HTN  H/o ITP  H/o disk herniation  Ankle surgery  Median neuropathy R  H/o sessile colon polyps 2014, h/o adenomas before that. [place] [facility] [place]essile serated adenoma x 1, tubular adenoma x 3  H/o chronic LBP  Dyslipidemia  Cataracts    CURRENT OUTPATIENT MEDICATIONS  Reviewed    ALLERGIES  Allergies   Allergen Reactions    Aspirin      325mg     Bee Venom Anaphylaxis    Penicillins Hives    Azithromycin Dizziness    Colon Care     Interferons Dermatitis      PHYSICAL EXAM  /73   Pulse 90   Temp 98.7  F (37.1  C)   Resp 16   Wt 93.8 kg (206 lb 14.4 oz)   SpO2 95%   BMI 33.39 kg/m    GEN: NAD  HEENT: EOMI, no icterus, injection or pallor  EXT: no edema  NEURO: alert    LABORATORY AND IMAGING STUDIES    PET/CT 1/17/24 was personally reviewed and interpreted by me

## 2024-01-18 NOTE — H&P
"  Pre-Operative H & P     CC:  Preoperative exam to assess for increased cardiopulmonary risk while undergoing surgery and anesthesia.    Date of Encounter: 1/18/2024  Primary Care Physician:  Mitzi Nettles     Reason for visit: No diagnosis found.    HEMAL Aparicio is a 68 year old female who presents for pre-operative H & P in preparation for  Procedure Information       Date/Time: ***     Procedure: ***    Anesthesia type: ***    Pre-op diagnosis: ***    Location: {Location:487899}    Providers: ***            ***    {History obtained from:6715356::\"History is obtained from the patient\"} and chart review    Hx of abnormal bleeding or anti-platelet use: ***    Menstrual history: No LMP recorded. Patient is postmenopausal.: ***     Past Medical History  Past Medical History:   Diagnosis Date    Adenocarcinoma, lung (H)     Asthma     Ectopic pregnancy     Esophageal reflux     Pulmonary emphysema (H)     Very severe FEV1<30% predicted    Type II diabetes mellitus (H)        Past Surgical History  Past Surgical History:   Procedure Laterality Date    2/8/16                R thoracotomy, RLL lobectomy (Dr. Cunha). Adenocarcinoma, 1.1 cm, assoicated with atypical adenomatous hyperplasia Right 02/08/2016    R thoracotomy, RLL lobectomy (Dr. Cunha). Adenocarcinoma, 1.1 cm, assoicated with atypical adenomatous hyperplasia    BRONCHOSCOPY, WITH BIOPSY, ROBOT ASSISTED N/A 7/19/2023    Procedure: robot assisted Ion BRONCHOSCOPY, WITH BIOPSY;  Surgeon: Patria Garcia MD;  Location: [facility] OR    ENDOBRONCHIAL ULTRASOUND FLEXIBLE N/A 7/19/2023    Procedure: Endobronchial ultrasound flexible;  Surgeon: Patria Garcia MD;  Location: [facility] OR    ESOPHAGOSCOPY, GASTROSCOPY, DUODENOSCOPY (EGD), COMBINED N/A 8/16/2022    Procedure: ESOPHAGOGASTRODUODENOSCOPY (EGD);  Surgeon: Travis Briseno MD;  Location:  GI    ESOPHAGOSCOPY, GASTROSCOPY, DUODENOSCOPY (EGD), COMBINED N/A 8/8/2023    Procedure: ESOPHAGOGASTRODUODENOSCOPY, " WITH BIOPSY;  Surgeon: Chao Rodrigues DO;  Location:  GI    ORTHOPEDIC SURGERY      PHACOEMULSIFICATION CLEAR CORNEA WITH STANDARD INTRAOCULAR LENS IMPLANT Left 8/24/2023    Procedure: LEFT EYE PHACOEMULSIFICATION, CATARACT, WITH INTRAOCULAR LENS IMPLANT;  Surgeon: Re Keita MD;  Location: [facility] OR    PHACOEMULSIFICATION CLEAR CORNEA WITH STANDARD INTRAOCULAR LENS IMPLANT Right 9/5/2023    Procedure: RIGHT EYE PHACOEMULSIFICATION, CATARACT, WITH INTRAOCULAR LENS IMPLANT;  Surgeon: Re Keita MD;  Location: [facility] OR       Prior to Admission Medications  Current Outpatient Medications   Medication Sig Dispense Refill    acetylcysteine (MUCOMYST) 10 % nebulizer solution Inhale 4 mLs into the lungs 4 times daily (Patient taking differently: Inhale 4 mLs into the lungs 4 times daily as needed) 480 mL 0    albuterol (PROAIR HFA/PROVENTIL HFA/VENTOLIN HFA) 108 (90 Base) MCG/ACT inhaler USE 1 OR 2 INHALATIONS EVERY 4 HOURS AS NEEDED (Patient taking differently: Inhale 1 puff into the lungs every 4 hours as needed USE 1 OR 2 INHALATIONS EVERY 4 HOURS AS NEEDED) 17 g 3    albuterol (PROVENTIL) (2.5 MG/3ML) 0.083% neb solution Take 1 vial (2.5 mg) by nebulization 4 times daily (Patient taking differently: Take 2.5 mg by nebulization 4 times daily as needed) 360 mL 0    alpha-lipoic acid 600 MG capsule Take 1 capsule (600 mg) by mouth daily (Patient taking differently: Take 600 mg by mouth every morning) 90 capsule 3    amLODIPine (NORVASC) 10 MG tablet Take 1 tablet (10 mg) by mouth daily (Patient taking differently: Take 10 mg by mouth every evening) 90 tablet 3    aspirin 81 MG EC tablet Take 81 mg by mouth every morning      buPROPion (WELLBUTRIN XL) 150 MG 24 hr tablet Take 1 tablet (150 mg) by mouth every morning 14 tablet 0    capsaicin (ZOSTRIX) 0.025 % external cream Apply a thin amount topically 3 times daily as needed (neuropathy in feet.) 25 g 11    carboxymethylcellulose (REFRESH LIQUIGEL) 1 % ophthalmic  solution Apply 1 drop to eye 4 times daily 15 mL 4    cetirizine (ZYRTEC) 10 MG tablet Take 1 tablet (10 mg) by mouth every morning 30 tablet 11    Continuous Blood Gluc  (DEXCOM G7 ) JOSEPHINE Use to read blood sugars as per 's instructions. 1 each 0    Continuous Blood Gluc Sensor (DEXCOM G7 SENSOR) MISC Change every 10 days. 3 each 5    Continuous Blood Gluc Sensor (FREESTYLE GIOVANNI 2 SENSOR) MISC 1 each every 14 days For use with Freestyle Giovanni 2  for continuous monitioring of blood glucose levels. Replace sensor every 14 days. 2 each 11    cyanocobalamin (VITAMIN B-12) 500 MCG tablet Take 1 tablet (500 mcg) by mouth daily (Patient taking differently: Take 500 mcg by mouth every morning) 90 tablet 1    empagliflozin (JARDIANCE) 25 MG TABS tablet Take 1 tablet (25 mg) by mouth daily (Patient taking differently: Take 25 mg by mouth every morning) 90 tablet 0    EPINEPHrine (ANY BX GENERIC EQUIV) 0.3 MG/0.3ML injection 2-pack Inject 0.3 mLs (0.3 mg) into the muscle as needed for anaphylaxis (related to bee stings) May repeat one time in 5-15 minutes if response to initial dose is inadequate. 2 each 1    ezetimibe (ZETIA) 10 MG tablet Take 1 tablet (10 mg) by mouth daily (Patient taking differently: Take 10 mg by mouth every morning) 90 tablet 3    fluticasone (FLONASE) 50 MCG/ACT nasal spray Spray 1 spray into both nostrils 2 times daily Use at night before bed 15.8 mL 3    Fluticasone-Umeclidin-Vilanterol (TRELEGY ELLIPTA) 200-62.5-25 MCG/ACT oral inhaler USE 1 INHALATION DAILY (Patient taking differently: 1 puff every morning USE 1 INHALATION DAILY) 28 each 5    GLUCOSAMINE-CHONDROITIN -400 MG tablet TAKE 1 TABLET DAILY (Patient taking differently: Take 1 tablet by mouth every evening) 90 tablet 3    ipratropium - albuterol 0.5 mg/2.5 mg/3 mL (DUONEB) 0.5-2.5 (3) MG/3ML neb solution Take 1 vial (3 mLs) by nebulization every 6 hours as needed for shortness of breath, wheezing  or cough 90 mL 1    nicotine (NICODERM CQ) 14 MG/24HR 24 hr patch Place 1 patch onto the skin every 24 hours      olopatadine (PATADAY) 0.2 % ophthalmic solution Place 0.05 mLs (1 drop) into both eyes daily 2.5 mL 11    omega-3 acid ethyl esters (LOVAZA) 1 g capsule Take 2 capsules (2 g) by mouth 2 times daily (Patient taking differently: Take 1 g by mouth 2 times daily Take 2 capsules (2 g) by mouth 2 times daily) 360 capsule 3    omeprazole (PRILOSEC) 40 MG DR capsule Take 1 capsule (40 mg) by mouth 2 times daily for 180 days 180 capsule 1    pregabalin (LYRICA) 150 MG capsule TAKE 1 CAPSULE(150 MG) BY MOUTH TWICE DAILY (Patient taking differently: Take 150 mg by mouth 2 times daily TAKE 1 CAPSULE(150 MG) BY MOUTH TWICE DAILY) 270 capsule 1    roflumilast (DALIRESP) 500 MCG TABS tablet Take 1 tablet (500 mcg) by mouth daily (Patient taking differently: Take 500 mcg by mouth every morning) 90 tablet 3    dulaglutide (TRULICITY) 0.75 MG/0.5ML pen Inject 0.75 mg Subcutaneous every 7 days (Patient not taking: Reported on 2024) 2 mL 1    insulin pen needle (BD MARCIO U/F) 32G X 4 MM miscellaneous Use 1 pen needle daily or as directed. 100 each 1    STATIN NOT PRESCRIBED (INTENTIONAL) Please choose reason not prescribed from choices below.         Allergies  Allergies   Allergen Reactions    Aspirin      325mg     Bee Venom Anaphylaxis    Penicillins Hives    Azithromycin Dizziness    Colon Care     Interferons Dermatitis       Social History  Social History     Socioeconomic History    Marital status: Single     Spouse name: Not on file    Number of children: Not on file    Years of education: Not on file    Highest education level: Not on file   Occupational History    Not on file   Tobacco Use    Smoking status: Former     Packs/day: .25     Types: Cigarettes     Quit date:      Years since quittin.0     Passive exposure: Past    Smokeless tobacco: Never    Tobacco comments:     2023 Patient using 14  "mg patch, wants to have prescription for Nicotrol inhaler, took workbook   Substance and Sexual Activity    Alcohol use: Not Currently    Drug use: No    Sexual activity: Not Currently     Partners: Male   Other Topics Concern    Not on file   Social History Narrative    Ms. Aparicio lives in an apartment complex by herself as of May 2022. She has frequent smoke exposure from neighbors even when she is not smoking herself.      Social Determinants of Health     Financial Resource Strain: Not on file   Food Insecurity: Not on file   Transportation Needs: Not on file   Physical Activity: Not on file   Stress: Not on file   Social Connections: Not on file   Interpersonal Safety: Not At Risk (3/1/2023)    Humiliation, Afraid, Rape, and Kick questionnaire     Fear of Current or Ex-Partner: No     Emotionally Abused: No     Physically Abused: No     Sexually Abused: No   Housing Stability: Not on file       Family History  Family History   Problem Relation Age of Onset    Thyroid Disease Mother     Cerebrovascular Disease Mother     Hypertension Mother     Hypertension Father     Glaucoma Father     Cancer Sister     Lung Cancer Sister     Diabetes Brother     Cancer Brother     Diabetes Brother     Cancer Brother     Diabetes Brother     Deep Vein Thrombosis (DVT) Daughter     Depression Daughter     Alcohol/Drug Other         self    Diabetes Other         self    Thyroid Disease Other         self    Asthma Other         self    Macular Degeneration No family hx of     Anesthesia Reaction No family hx of        Review of Systems  The complete review of systems is negative other than noted in the HPI or here.      /73   Pulse 90   Temp 98.7  F (37.1  C)   Ht 1.676 m (5' 6\")   Wt 93.8 kg (206 lb 14.4 oz)   SpO2 96%   BMI 33.39 kg/m      Physical Exam   Constitutional: Awake, alert, cooperative, no apparent distress, and appears stated age.  Eyes: Pupils equal, round and reactive to light, extra ocular muscles " intact, sclera clear, conjunctiva normal.  HENT: Normocephalic, oral pharynx with moist mucus membranes, good dentition. No goiter appreciated.   Respiratory: Clear to auscultation bilaterally, no crackles or wheezing.  Cardiovascular: Regular rate and rhythm, normal S1 and S2, and no murmur noted.  Carotids +2, no bruits. No edema. Palpable pulses to radial  DP and PT arteries.   GI: Normal bowel sounds, soft, non-distended, non-tender, no masses palpated, no hepatosplenomegaly.  Surgical scars: ***  Lymph/Hematologic: No cervical lymphadenopathy and no supraclavicular lymphadenopathy.  Genitourinary:  ***  Skin: Warm and dry.  No rashes at anticipated surgical site.   Musculoskeletal: Full ROM of neck. There is no redness, warmth, or swelling of the joints. Gross motor strength is normal.    Neurologic: Awake, alert, oriented to name, place and time. Cranial nerves II-XII are grossly intact. Gait is normal.   Neuropsychiatric: Calm, cooperative. Normal affect.     Prior Labs/Diagnostic Studies   All labs and imaging personally reviewed     EKG/ stress test - if available please see in ROS above   Echo result w/o MOPS: Interpretation SummaryLeft ventricular size, wall motion and function are normal. The ejectionfraction is 55-60%. Right ventricular function, chamber size, wall motion, and thickness arenormal. No pericardial effusion is present. The inferior vena cava is normal. No significant valvular abnormalities present. There is no prior study for direct comparison.          Latest Ref Rng & Units 4/21/2023     3:08 PM   PFT   FVC L 1.36    FEV1 L 0.55    FVC% % 46    FEV1% % 24          The patient's records and results personally reviewed by this provider.     Outside records reviewed from: {[facility] PAC RECORDS REVIEWED:387628}    LAB/DIAGNOSTIC STUDIES TODAY:  ***    Assessment  {Refresh the note after risk documentation is completed Link to Preoperative Risk Scoring :624583}  Jenn Aparicio is a 68 year old female  "seen as a PAC referral for risk assessment and optimization for anesthesia.    Plan/Recommendations  Pt will be optimized for the proposed procedure.  See below for details on the assessment, risk, and preoperative recommendations    NEUROLOGY  {History:211136}  {Chronic Pain (Optional):162004}  -Post Op delirium risk factors:  {Post Op Delirium:550157}    ENT  {Airway:274803}  Mallampati: {Mallampati:540422}  TM: {TM results:102641}    CARDIAC  {Cardiac:597550}  - METS (Metabolic Equivalents)  Patient CANNOT perform 4 METS exercise without symptoms            Total Score: 1    Functional Capacity: Unable to complete 4 METS      RCRI-Very low risk: Class 1 0.4% complication rate            Total Score: 0        PULMONARY  {DAVID (Optional):220585}  DAVID Low Risk            Total Score: -        Criteria with incomplete data:    DAVID: Snores loudly    DAVID: Often tired    DAVID: Observed stopped breathing    DAVID: Hypertension    DAVID: BMI over 35 kg/m2    DAVID: Over 50 ys old    DAVID: Neck Circum >16 in    DAVID: Male      - This score is not calculated because of inadequate data     {Asthma/COPD:205020}  - Tobacco History  {Refresh the note if updates are made Link to Tobacco History :863217}  History   Smoking Status    Former    Packs/day: 0.25    Types: Cigarettes    Quit date: 2023   Smokeless Tobacco    Never       GI  {GERD/GI (Optional):587681}  PONV High Risk  Total Score: 3           1 AN PONV: Pt is Female    1 AN PONV: Patient is not a current smoker    1 AN PONV: Intended Post Op Opioids        /RENAL  - Baseline Creatinine  ***    ENDOCRINE    - BMI: Estimated body mass index is 33.39 kg/m  as calculated from the following:    Height as of this encounter: 1.676 m (5' 6\").    Weight as of this encounter: 93.8 kg (206 lb 14.4 oz).  {BMI Classification (Optional):972971}  {Endocrine (Optional):423419}    HEME  VTE Medium Risk 1.8%            Total Score: 5    VTE: Greater than 59 yrs old    VTE: Family Hx of VTE    "   {Coagulopathy:859010}  {Anemia (Optional):765046}    MSK  Patient is NOT Frail            Total Score: -        Criteria with incomplete data:    Frailty: Weight loss 10 lbs or greater    Frailty: Slower walking speed    Frailty: Decrease in strength    Frailty: Increased exhaustion    Frailty: Overall lack of energy      - This score is not calculated because of inadequate data     {Frailty Follow Up (Optional):820255}    PSYCH  - ***    Different anesthesia methods/types have been discussed with the patient, but they are aware that the final plan will be decided by the assigned anesthesia provider on the date of service.  Patient was discussed with {[facility] Providers:[id number]}    The patient is optimized for their procedure. AVS with information on surgery time/arrival time, meds and NPO status given by nursing staff. No further diagnostic testing indicated.      On the day of service:     Prep time: *** minutes  Visit time: *** minutes  Documentation time: *** minutes  ------------------------------------------  Total time: *** minutes      BENOIT Wills CNP  Preoperative Assessment Center  [facility]  Phone: [phone]  Fax: [phone]

## 2024-01-18 NOTE — NURSING NOTE
"Oncology Rooming Note    January 18, 2024 12:25 PM   Jenn Aparicio is a 68 year old female who presents for:    Chief Complaint   Patient presents with    Oncology Clinic Visit     Malignant neoplasm of lower lobe of right lung      Initial Vitals: /73   Pulse 90   Temp 98.7  F (37.1  C)   Resp 16   Wt 93.8 kg (206 lb 14.4 oz)   SpO2 95%   BMI 33.39 kg/m   Estimated body mass index is 33.39 kg/m  as calculated from the following:    Height as of 12/18/23: 1.676 m (5' 6\").    Weight as of this encounter: 93.8 kg (206 lb 14.4 oz). Body surface area is 2.09 meters squared.  Moderate Pain (5) Comment: Data Unavailable   No LMP recorded. Patient is postmenopausal.  Allergies reviewed: Yes  Medications reviewed: Yes    Medications: Medication refills not needed today.  Pharmacy name entered into EPIC:    [facility] [address] [id number] - [place], [place] - [facility] [facility] [address]  [facility] - [facility], [place] [address] [facility]    Frailty Screening:   Is the patient here for a new oncology consult visit in cancer care? 2. No      Clinical concerns: Pt is experiencing constant pain in both her legs and is having a hard time moving around.        Sj Pro"

## 2024-01-19 ENCOUNTER — TELEPHONE (OUTPATIENT)
Dept: INTERNAL MEDICINE | Facility: CLINIC | Age: 69
End: 2024-01-19
Payer: COMMERCIAL

## 2024-01-19 ENCOUNTER — DOCUMENTATION ONLY (OUTPATIENT)
Facility: CLINIC | Age: 69
End: 2024-01-19
Payer: COMMERCIAL

## 2024-01-19 ENCOUNTER — TELEPHONE (OUTPATIENT)
Dept: INTERNAL MEDICINE | Facility: CLINIC | Age: 69
End: 2024-01-19

## 2024-01-19 NOTE — TELEPHONE ENCOUNTER
We received a message that pt has questions about her trulicity, zetia and bupropion.  I called the pt. She states that :     She [facility] get [email or web address], not sure why[facility]  She misunderstood about atorvastatin and zetia, so she took these pills together at bed time. She could not sleep well during the night. I clarified that atorvastatin was replaced with zetia, so she should not take atorvastatin any more.  She wants to be off of bupropion as she is not smoking any more and she has been taking bupropion 150 mg/day.        I called the pharmacy, was told trulicity needs PA. I sent it to PA team.    Soon-Mi

## 2024-01-19 NOTE — PROGRESS NOTES
HOME VISIT APPT DATE: 1/19/2024    S: JOHN WAS FINALLY HOME WHEN I WENT TO OUR SCHEDULED HOME VISIT TODAY. SHE HAS NOT BEEN WEARING HER DEVICE, BUT I DID RE-EDUCATE HER ON EVERYTHING THAT HAS TO DO WITH THE TRILOGY. HER HUB WAS NOT PLUGGED IN, SO I TOLD HER THE IMPORTANCE OF HAVING THE HUB PLUGGED IN SO WE CAN SEE HER DATA. I ALSO CHANGED OUT ALL OF HER SUPPLIES AND I TOLD HER I WOULD LIKE TO VISIT AND SEE HER MORE OFTEN. JOHN ALSO HAS A [id number] THAT SHE IS TRYING TO GIVE BACK TO [facility] BECAUSE SHE DOESN'T THINK THE MACHINE IS BENEFICIAL FOR HER, SHE JUST AN NOT GET A HOLD OF ANYONE.    DX: CRF 2ND TO COPD, LUNG CA AND RLL LOBECTOMY  VITALS:  BREATH SOUNDS: COURSE AND DIMINISHED  SECRETIONS: NO COUGH OR SECRETIONS  LOC: ALERT AND ORIENTED    SETUP DATE: 2/24/2023  DEVICE TYPE: TRILOGY  SETTINGS (include mode): AVAPS AE, , RR 14, EPAP 5-12, PS 4-30  COMFORT SETTINGS: IT 1.5SEC (0.3-3.0) MAX PRESSURE 40  VENT CHECK: PIP 9.4, , RR 25, MINVENT 16.7, I:E 1:2.0  INTERFACE: AIRTOUCH AND AIRFIT F20 MEDIUM  CIRCUIT/HUMIDITY: HEATED  ALARMS: ALARMS OFF  O2 BLEED IN (YES/NO): YES, 3-4L/M    VENTILATOR INSPECTION DONE: YES SETTINGS CHECK: YES ALARM CHECK: YES  VENTILATOR SETTINGS VERIFIED: YES  CIRCUIT CHECK: YES CHANGED: YES CIRCUIT SUPPLIES GIVEN: YES  MASK: AIRFIT F20 TYPE: FFM MEDIUM CONDITION: GOOD REPLACED: YES  FILTERS: EXTERNAL CHANGED W/CIRCUIT YES LEFT WITH SUPPLIES YES INLET FILTER CHECKED OR CHANGED NA  OXYGEN EQUIPMENT REVIEWED IF APPLICABLE: YES, PATIENT STATED THE [id number] MADE THE CONCENTRATOR MISFUNCTION    MD NAME/PHONE/FAX: BRYAN NAVA  CAREGIVER NAME: SELF    BRAEDEN DOMINGUEZ-RRT  [phone]

## 2024-01-19 NOTE — TELEPHONE ENCOUNTER
Patient would like a call back about medication. He stated he called pharmacy writer let him know about prior auth.

## 2024-01-19 NOTE — TELEPHONE ENCOUNTER
Prior Authorization Retail Medication Request    Medication/Dose: dulaglutide (TRULICITY) 0.75 MG/0.5ML pen  Diagnosis and ICD code (if different than what is on RX):    New/renewal/insurance change PA/secondary ins. PA:  Previously Tried and Failed:    Rationale:      Insurance   Primary:   Insurance ID:      Secondary (if applicable):  Insurance ID:      Pharmacy Information (if different than what is on RX)  Name:    Phone:    Fax:

## 2024-01-24 DIAGNOSIS — E87.6 HYPOKALEMIA: Primary | ICD-10-CM

## 2024-01-24 RX ORDER — POTASSIUM CHLORIDE 1500 MG/1
20 TABLET, EXTENDED RELEASE ORAL DAILY
Qty: 4 TABLET | Refills: 0 | Status: SHIPPED | OUTPATIENT
Start: 2024-01-24 | End: 2024-03-11

## 2024-01-25 NOTE — TELEPHONE ENCOUNTER
Patient following up on below medication TRULICITY status. Please call patient at [phone] to discuss further. Thank you

## 2024-01-26 ENCOUNTER — NURSE TRIAGE (OUTPATIENT)
Dept: NURSING | Facility: CLINIC | Age: 69
End: 2024-01-26
Payer: COMMERCIAL

## 2024-01-26 ENCOUNTER — TELEPHONE (OUTPATIENT)
Dept: ENDOCRINOLOGY | Facility: CLINIC | Age: 69
End: 2024-01-26
Payer: COMMERCIAL

## 2024-01-26 DIAGNOSIS — E11.9 TYPE 2 DIABETES MELLITUS WITHOUT COMPLICATION, WITHOUT LONG-TERM CURRENT USE OF INSULIN (H): ICD-10-CM

## 2024-01-26 DIAGNOSIS — J30.2 SEASONAL ALLERGIC RHINITIS, UNSPECIFIED TRIGGER: ICD-10-CM

## 2024-01-26 DIAGNOSIS — E11.65 TYPE 2 DIABETES MELLITUS WITH HYPERGLYCEMIA, WITHOUT LONG-TERM CURRENT USE OF INSULIN (H): ICD-10-CM

## 2024-01-26 DIAGNOSIS — J44.9 CHRONIC OBSTRUCTIVE PULMONARY DISEASE, UNSPECIFIED COPD TYPE (H): ICD-10-CM

## 2024-01-26 RX ORDER — CETIRIZINE HYDROCHLORIDE 10 MG/1
10 TABLET ORAL EVERY MORNING
Qty: 30 TABLET | Refills: 11 | Status: SHIPPED | OUTPATIENT
Start: 2024-01-26 | End: 2024-04-09

## 2024-01-26 RX ORDER — ACYCLOVIR 400 MG/1
TABLET ORAL
Qty: 3 EACH | Refills: 5 | Status: SHIPPED | OUTPATIENT
Start: 2024-01-26 | End: 2024-06-26

## 2024-01-26 RX ORDER — FLUTICASONE FUROATE, UMECLIDINIUM BROMIDE AND VILANTEROL TRIFENATATE 200; 62.5; 25 UG/1; UG/1; UG/1
POWDER RESPIRATORY (INHALATION)
Qty: 28 EACH | Refills: 5 | Status: SHIPPED | OUTPATIENT
Start: 2024-01-26

## 2024-01-26 NOTE — TELEPHONE ENCOUNTER
Trelegy ellipta inhaler and cetirizine 10mg tab  Last prescribing provider: Dr. Zarina Leung     Last clinic visit date: 1/18/24    Recommendations for requested medication (if none, N/A): NA    Any other pertinent information (if none, N/A): fax request, change in pharmacy    Refilled: Y/N, if NO, why?

## 2024-01-26 NOTE — TELEPHONE ENCOUNTER
Nurse Triage SBAR    Is this a 2nd Level Triage? YES, LICENSED PRACTITIONER REVIEW IS REQUIRED    Situation: Calling to report hives on arms and face, with itching/burning/stinging for past 3-4 days. Some are raised. Also reports bilateral bruising to lower extremities that came out of nowhere.    Background: COPD, adenocarcinoma of the lung, acute on chronic hypoxic respiratory failure, DM type II complicated by diabetic neuropathy.     Last MTM visit 1/11/24 with Samy Prasad.  Last office visit 10/5/23 with Overkamp.    Pt on many medications, unsure what is causing this.  Appears patient was just started on Ezetimibe, Kcl and most recently Trulicity but has NOT started any of these meds yet. Capsaicin cream on her feet is the only thing that is a NEW addition. No new cleaning products or soaps.  Not on blood thinner except 81mg ASA.    Assessment:   Rash/bumps itch and burn, then sting when scratching.  Feels hot.  No appetite.  Staying hydrated, no problems going to the bathroom.  Endorses joint pain baseline.  On home O2 at baseline, has needed to turn up her O2 from 3 to 4L. SpO2 96%.  Denies pain.     Protocol Recommended Disposition:   Go to ER now    Recommendation: Patient being picked up by a medical ride and will transport to [facility]. Advised to bring Epi-pen as well as medication list. Do not scratch. Triager stayed on phone until she is en route. She is amenable to this plan. Follow up on pt disposition.     Addendum: As of 1600 medical ride still not present. Triager called 911 dispatch d/t concerns for patient respiratory status and reports more difficulty breathing. Confirmed paramedic arrival and patient to be transported to [facility].    Routed to provider/clinic        Reason for Disposition   [1] Drug allergy suspected AND [2] taking prescription medicine AND [3] widespread rash   Patient sounds very sick or weak to the triager    Answer Assessment - Initial Assessment Questions  1. APPEARANCE  "of RASH: \"Describe the rash.\" (e.g., spots, blisters, raised areas, skin peeling, scaly)    Fine little red bumps on arms, face  Big bruises on legs bilaterally          2. SIZE: \"How big are the spots?\" (e.g., tip of pen, eraser, coin; inches, centimeters)      \"Fine bumps\"    3. LOCATION: \"Where is the rash located?\"      Arms, face    4. COLOR: \"What color is the rash?\" (Note: It is difficult to assess rash color in people with darker-colored skin. When this situation occurs, simply ask the caller to describe what they see.)      Red    5. ONSET: \"When did the rash begin?\"      3-4 days    6. FEVER: \"Do you have a fever?\" If Yes, ask: \"What is your temperature, how was it measured, and when did it start?\"      Feels \"warm\"    7. ITCHING: \"Does the rash itch?\" If Yes, ask: \"How bad is the itch?\" (Scale 1-10; or mild, moderate, severe)      Itches then burning and stinging pain    8. CAUSE: \"What do you think is causing the rash?\"      Thought at first she had too many layers on, but now wondering if its a reaction to a medication    9. NEW MEDICINES: \"What new medicines are you taking?\" (e.g., name of antibiotic) \"When did you start taking this medication?\".      Thinks only new change was atorvastatin stops and switch to ezetimibe    10. OTHER SYMPTOMS: \"Do you have any other symptoms?\" (e.g., sore throat, fever, joint pain)          Needed to turn up her O2 from 3L to 4L    11. PREGNANCY: \"Is there any chance you are pregnant?\" \"When was your last menstrual period?\"        N/a    Protocols used: Allergic Reactions - Guideline [id number], Rash - Widespread On Drugs-A-AH      "

## 2024-01-30 ENCOUNTER — PATIENT OUTREACH (OUTPATIENT)
Dept: ONCOLOGY | Facility: CLINIC | Age: 69
End: 2024-01-30
Payer: COMMERCIAL

## 2024-01-30 ENCOUNTER — PATIENT OUTREACH (OUTPATIENT)
Dept: GASTROENTEROLOGY | Facility: CLINIC | Age: 69
End: 2024-01-30
Payer: COMMERCIAL

## 2024-01-30 NOTE — TELEPHONE ENCOUNTER
[facility]: Cancer Care                                                                                          Reached out to Jenn via phone as she has not read the [address] message Dr. Leung sent last week. Discussed that Dr. Leung sent a four day potassium replacement rx to [facility] in [place], as Jenn's most recent potassium level was 3.3. Jenn said she will  the prescription and she had no further questions.     Ana Davey, RN, BSN  RN Care Coordinator  [facility]

## 2024-02-01 NOTE — TELEPHONE ENCOUNTER
Prior Authorization Approval    Medication: TRULICITY 0.75 MG/0.5ML SC SOPN  Authorization Effective Date: 1/31/2024  Authorization Expiration Date: 1/31/2025  Approved Dose/Quantity:   Reference #:     Insurance Company: Li - Phone [phone] Fax [phone]  Expected CoPay: $    CoPay Card Available:      Financial Assistance Needed:   Which Pharmacy is filling the prescription: [id number] - [place] - [address] [place]  Pharmacy Notified:Yes   Patient Notified: **Instructed pharmacy to notify patient when script is ready to /ship.**

## 2024-02-02 ENCOUNTER — TELEPHONE (OUTPATIENT)
Dept: INTERNAL MEDICINE | Facility: CLINIC | Age: 69
End: 2024-02-02
Payer: COMMERCIAL

## 2024-02-02 DIAGNOSIS — J44.9 CHRONIC OBSTRUCTIVE PULMONARY DISEASE, UNSPECIFIED COPD TYPE (H): ICD-10-CM

## 2024-02-02 DIAGNOSIS — J44.1 COPD EXACERBATION (H): ICD-10-CM

## 2024-02-02 DIAGNOSIS — K44.9 HIATAL HERNIA: ICD-10-CM

## 2024-02-02 DIAGNOSIS — R07.9 CHEST PAIN, UNSPECIFIED TYPE: Primary | ICD-10-CM

## 2024-02-02 DIAGNOSIS — E11.40 DIABETIC NEUROPATHY (H): ICD-10-CM

## 2024-02-02 DIAGNOSIS — J30.2 SEASONAL ALLERGIC RHINITIS, UNSPECIFIED TRIGGER: ICD-10-CM

## 2024-02-02 DIAGNOSIS — H04.123 DRY EYES: ICD-10-CM

## 2024-02-02 DIAGNOSIS — E11.65 TYPE 2 DIABETES MELLITUS WITH HYPERGLYCEMIA, WITHOUT LONG-TERM CURRENT USE OF INSULIN (H): ICD-10-CM

## 2024-02-02 DIAGNOSIS — E11.9 TYPE 2 DIABETES MELLITUS WITHOUT COMPLICATION, WITHOUT LONG-TERM CURRENT USE OF INSULIN (H): ICD-10-CM

## 2024-02-02 DIAGNOSIS — E78.5 HYPERLIPIDEMIA, UNSPECIFIED HYPERLIPIDEMIA TYPE: ICD-10-CM

## 2024-02-02 DIAGNOSIS — K21.9 GASTROESOPHAGEAL REFLUX DISEASE, UNSPECIFIED WHETHER ESOPHAGITIS PRESENT: ICD-10-CM

## 2024-02-02 DIAGNOSIS — E11.42 DIABETIC PERIPHERAL NEUROPATHY (H): ICD-10-CM

## 2024-02-02 DIAGNOSIS — I10 ESSENTIAL HYPERTENSION: ICD-10-CM

## 2024-02-05 RX ORDER — EZETIMIBE 10 MG/1
10 TABLET ORAL DAILY
Qty: 90 TABLET | Refills: 3 | OUTPATIENT
Start: 2024-02-05

## 2024-02-05 RX ORDER — ALBUTEROL SULFATE 0.83 MG/ML
2.5 SOLUTION RESPIRATORY (INHALATION) 4 TIMES DAILY
Qty: 360 ML | Refills: 0 | Status: SHIPPED | OUTPATIENT
Start: 2024-02-05 | End: 2024-07-09

## 2024-02-05 RX ORDER — ALBUTEROL SULFATE 90 UG/1
AEROSOL, METERED RESPIRATORY (INHALATION)
Qty: 17 G | Refills: 2 | Status: SHIPPED | OUTPATIENT
Start: 2024-02-05 | End: 2024-07-09

## 2024-02-05 RX ORDER — OMEPRAZOLE 40 MG/1
40 CAPSULE, DELAYED RELEASE ORAL 2 TIMES DAILY
Qty: 180 CAPSULE | Refills: 1 | OUTPATIENT
Start: 2024-02-05

## 2024-02-05 RX ORDER — PREGABALIN 150 MG/1
CAPSULE ORAL
Qty: 270 CAPSULE | Refills: 1 | OUTPATIENT
Start: 2024-02-05

## 2024-02-05 RX ORDER — RIBOFLAVIN (VITAMIN B2) 100 MG
1 TABLET ORAL DAILY
Qty: 90 TABLET | Refills: 3 | OUTPATIENT
Start: 2024-02-05

## 2024-02-05 RX ORDER — ROFLUMILAST 500 UG/1
500 TABLET ORAL DAILY
Qty: 90 TABLET | Refills: 3 | OUTPATIENT
Start: 2024-02-05

## 2024-02-05 RX ORDER — OMEGA-3-ACID ETHYL ESTERS 1 G/1
CAPSULE, LIQUID FILLED ORAL
Qty: 360 CAPSULE | Refills: 3 | OUTPATIENT
Start: 2024-02-05

## 2024-02-05 RX ORDER — ASPIRIN 81 MG/1
81 TABLET ORAL EVERY MORNING
Qty: 90 TABLET | Refills: 3 | Status: SHIPPED | OUTPATIENT
Start: 2024-02-05

## 2024-02-05 RX ORDER — AMLODIPINE BESYLATE 10 MG/1
10 TABLET ORAL DAILY
Qty: 90 TABLET | Refills: 3 | OUTPATIENT
Start: 2024-02-05

## 2024-02-05 RX ORDER — PERPHENAZINE 16 MG
600 TABLET ORAL DAILY
Qty: 90 CAPSULE | Refills: 3 | OUTPATIENT
Start: 2024-02-05

## 2024-02-05 RX ORDER — FLUTICASONE PROPIONATE 50 MCG
1 SPRAY, SUSPENSION (ML) NASAL 2 TIMES DAILY
Qty: 15.8 ML | Refills: 3 | OUTPATIENT
Start: 2024-02-05

## 2024-02-05 NOTE — TELEPHONE ENCOUNTER
aspirin 81 MG EC tablet       Last Written Prescription Date: historical  Last Fill Quantity: ,   # refills:   Last Office Visit : 10-5-23  Future Office visit:  24    Routing refill request to provider for review/approval because:  Medication is reported/historical     pregabalin (LYRICA) 150 MG capsule         Last Written Prescription Date:  10-5-23  Last Fill Quantity: 270,   # refills: 1  [facility] [id number]    Routing refill request to provider for review/approval because:  Drug not on the [facility], [facility] or [facility] refill protocol or controlled substance  Request from [facility] mail order    albuterol (PROVENTIL) (2.5 MG/3ML) 0.083% neb solution       Last Written Prescription Date:  3-16-23  Last Fill Quantity: 360 ml,   # refills: 0    Routing refill request to provider for review/approval because:  Overdue ACT  Gap in RF    albuterol (PROAIR HFA/PROVENTIL HFA/VENTOLIN HFA) 108 (90 Base) MCG/ACT inhaler       Last Written Prescription Date:  23  Last Fill Quantity: 17g,   # refills: 3  RF 17   FYI to clinic over due ACT      carboxymethylcellulose (REFRESH LIQUIGEL) 1 % ophthalmic solution       Last Written Prescription Date:  10-5-23  Last Fill Quantity: 15 ml,   # refills: 4  RF 15ml:4      RX available  ezetimibe (ZETIA) 10 MG tablet 90 tablet 3 2024 -- No   Sig - Route: Take 1 tablet (10 mg) by mouth daily - Oral       alpha-lipoic acid 600 MG capsule 90 capsule 3 2024 -- No   Sig - Route: Take 1 capsule (600 mg) by mouth daily - Oral         omeprazole (PRILOSEC) 40 MG DR capsule 180 capsule 1 2023 3/16/2024 No   Sig - Route: Take 1 capsule (40 mg) by mouth 2 times daily for 180 days - Oral   Prescribing Provider's NPI: [id number]  Gabriela Damon    [id number] - [place] - [address] AT [facility] OF  [address]   [phone]   amLODIPine (NORVASC) 10 MG tablet 90 tablet 3 2023 -- No   Sig - Route: Take 1 tablet (10 mg) by mouth daily - Oral      GLUCOSAMINE-CHONDROITIN -400 MG tablet 90 tablet 3 4/20/2023 -- No   Sig: TAKE 1 TABLET DAILY     omega-3 acid ethyl esters (LOVAZA) 1 g capsule 360 capsule 3 4/26/2023 -- No   Sig: Take 2 capsules (2 g) by mouth 2 times daily       [facility] - [place], [place] - [address] [place] [address]   [phone]         DENIED: send to orginating provider ( med refill team does not cover these clinic)  Pharm called, message left    empagliflozin (JARDIANCE) 25 MG TABS tablet   90 tablet 0 11/29/2023 -- No  Sig - Route: Take 1 tablet (25 mg) by mouth daily - Oral  La Nena Shabazz MD @ [facility]    fluticasone (FLONASE) 50 MCG/ACT nasal spray 15.8 mL 3 8/9/2023 -- No   Sig - Route: Spray 1 spray into both nostrils 2 times daily Use at night before bed - Both Nostrils   [facility]      roflumilast (DALIRESP) 500 MCG TABS tablet 90 tablet 3 12/13/2023 -- No   Sig - Route: Take 1 tablet (500 mcg) by mouth daily - Oral   [facility]

## 2024-02-07 ENCOUNTER — VIRTUAL VISIT (OUTPATIENT)
Dept: ENDOCRINOLOGY | Facility: CLINIC | Age: 69
End: 2024-02-07
Payer: COMMERCIAL

## 2024-02-07 DIAGNOSIS — E11.65 TYPE 2 DIABETES MELLITUS WITH HYPERGLYCEMIA, WITHOUT LONG-TERM CURRENT USE OF INSULIN (H): Primary | ICD-10-CM

## 2024-02-07 PROCEDURE — 99215 OFFICE O/P EST HI 40 MIN: CPT | Mod: 95 | Performed by: INTERNAL MEDICINE

## 2024-02-07 NOTE — PROGRESS NOTES
ENDOCRINOLOGY VIDEO FOLLOW-UP        HISTORY OF PRESENT ILLNESS    Jenn Aparicio is seen in follow-up via a billable video visit.    Diagnosed with pneumonia in the interim since last visit: Was treated with antibiotics but did not require steroids.  She has had to increase supplemental oxygen.    She had been on Ozempic at the time of our last visit in 10/2023: She adjusted dose to 0.5 mg weekly but contacted our clinic in 12/2023 with increasing headache, nausea and muscle cramping with the dose change.  We therefore discontinued Ozempic and the patient notes her symptoms have improved.    She was subsequently seen in [place] and an alternate GLP-1 was proposed: She started Trulicity 0.75 mg weekly earlier this week.  Thus far, she has tolerated this well.    Current diabetes regimen:  -Jardiance 25 mg daily  -Trulicity 0.75 mg weekly    Since Freestyle Marti was causing issues (not adhering to skin) we changed to Dexcom CGM after last visit.  This has been adhering more consistently.  However, there was a pharmacy error and she received a supply of marti: She is waiting for delivery of Dexcom supplies.  Therefore, she does not have glucose data presently.    Atorvastatin was also changed to ezetimibe at her MTM visit in 1/2024 due to increased muscle soreness.    Pertinent endocrine and related history:  1.  Diabetes mellitus type 2.  -Ms. Aparicio is uncertain when she was diagnosed with diabetes: Progress Notes as far back as 2008 mention diagnosis of type 2 diabetes.  She previously was seen in endocrinology at [facility].    Past diabetes medications:  -Metformin, developed diarrhea, has tried both extended release and short acting formulations  -Januvia appeared on medication list previously, does not recall side effect with this  -Insulin, recalls developing hypoglycemia prompting discontinuation  2.  Diabetic peripheral neuropathy.    Her medical history is also notable for COPD (on home oxygen),  hypertension, GERD with dysphagia, history of right lower lobe adenocarcinoma of the lung (post lobectomy in 2016) and right upper lobe non-small cell lung cancer (post radiation therapy in 2020).  A right upper lobe lung nodule has been noted to be increasing in size on chest CT.    PAST MEDICAL HISTORY  Past Medical History:   Diagnosis Date    Adenocarcinoma, lung (H)     Asthma     Ectopic pregnancy     Esophageal reflux     Pulmonary emphysema (H)     Very severe FEV1<30% predicted    Type II diabetes mellitus (H)        MEDICATIONS  Current Outpatient Medications   Medication Sig Dispense Refill    acetylcysteine (MUCOMYST) 10 % nebulizer solution Inhale 4 mLs into the lungs 4 times daily (Patient taking differently: Inhale 4 mLs into the lungs 4 times daily as needed) 480 mL 0    albuterol (PROAIR HFA/PROVENTIL HFA/VENTOLIN HFA) 108 (90 Base) MCG/ACT inhaler USE 1 OR 2 INHALATIONS EVERY 4 HOURS AS NEEDED 17 g 2    albuterol (PROVENTIL) (2.5 MG/3ML) 0.083% neb solution Take 1 vial (2.5 mg) by nebulization 4 times daily 360 mL 0    alpha-lipoic acid 600 MG capsule Take 1 capsule (600 mg) by mouth daily (Patient taking differently: Take 600 mg by mouth every morning) 90 capsule 3    amLODIPine (NORVASC) 10 MG tablet Take 1 tablet (10 mg) by mouth daily (Patient taking differently: Take 10 mg by mouth every evening) 90 tablet 3    aspirin 81 MG EC tablet Take 1 tablet (81 mg) by mouth every morning 90 tablet 3    buPROPion (WELLBUTRIN XL) 150 MG 24 hr tablet Take 1 tablet (150 mg) by mouth every morning 14 tablet 0    capsaicin (ZOSTRIX) 0.025 % external cream Apply a thin amount topically 3 times daily as needed (neuropathy in feet.) 25 g 11    carboxymethylcellulose (REFRESH LIQUIGEL) 1 % ophthalmic solution Apply 1 drop to eye 4 times daily 15 mL 4    cetirizine (ZYRTEC) 10 MG tablet Take 1 tablet (10 mg) by mouth every morning 30 tablet 11    Continuous Blood Gluc  (DEXCOM G7 ) JOSEPHINE Use to  read blood sugars as per 's instructions. 1 each 0    Continuous Blood Gluc Sensor (DEXCOM G7 SENSOR) MISC Change every 10 days. 3 each 5    Continuous Blood Gluc Sensor (FREESTYLE GIOVANNI 2 SENSOR) MISC 1 each every 14 days For use with Freestyle Giovanni 2  for continuous monitioring of blood glucose levels. Replace sensor every 14 days. 2 each 11    cyanocobalamin (VITAMIN B-12) 500 MCG tablet Take 1 tablet (500 mcg) by mouth daily (Patient taking differently: Take 500 mcg by mouth every morning) 90 tablet 1    dulaglutide (TRULICITY) 0.75 MG/0.5ML pen Inject 0.75 mg Subcutaneous every 7 days 2 mL 1    empagliflozin (JARDIANCE) 25 MG TABS tablet Take 1 tablet (25 mg) by mouth daily (Patient taking differently: Take 25 mg by mouth every morning) 90 tablet 0    EPINEPHrine (ANY BX GENERIC EQUIV) 0.3 MG/0.3ML injection 2-pack Inject 0.3 mLs (0.3 mg) into the muscle as needed for anaphylaxis (related to bee stings) May repeat one time in 5-15 minutes if response to initial dose is inadequate. 2 each 1    ezetimibe (ZETIA) 10 MG tablet Take 1 tablet (10 mg) by mouth daily (Patient taking differently: Take 10 mg by mouth every morning) 90 tablet 3    fluticasone (FLONASE) 50 MCG/ACT nasal spray Spray 1 spray into both nostrils 2 times daily Use at night before bed 15.8 mL 3    Fluticasone-Umeclidin-Vilanterol (TRELEGY ELLIPTA) 200-62.5-25 MCG/ACT oral inhaler USE 1 INHALATION DAILY 28 each 5    GLUCOSAMINE-CHONDROITIN -400 MG tablet TAKE 1 TABLET DAILY (Patient taking differently: Take 1 tablet by mouth every evening) 90 tablet 3    ipratropium - albuterol 0.5 mg/2.5 mg/3 mL (DUONEB) 0.5-2.5 (3) MG/3ML neb solution Take 1 vial (3 mLs) by nebulization every 6 hours as needed for shortness of breath, wheezing or cough 90 mL 1    nicotine (NICODERM CQ) 14 MG/24HR 24 hr patch Place 1 patch onto the skin every 24 hours      olopatadine (PATADAY) 0.2 % ophthalmic solution Place 0.05 mLs (1 drop) into  both eyes daily 2.5 mL 11    omega-3 acid ethyl esters (LOVAZA) 1 g capsule Take 2 capsules (2 g) by mouth 2 times daily (Patient taking differently: Take 1 g by mouth 2 times daily Take 2 capsules (2 g) by mouth 2 times daily) 360 capsule 3    omeprazole (PRILOSEC) 40 MG DR capsule Take 1 capsule (40 mg) by mouth 2 times daily for 180 days 180 capsule 1    potassium chloride ER (KLOR-CON M) 20 MEQ CR tablet Take 1 tablet (20 mEq) by mouth daily 4 tablet 0    pregabalin (LYRICA) 150 MG capsule TAKE 1 CAPSULE(150 MG) BY MOUTH TWICE DAILY (Patient taking differently: Take 150 mg by mouth 2 times daily TAKE 1 CAPSULE(150 MG) BY MOUTH TWICE DAILY) 270 capsule 1    roflumilast (DALIRESP) 500 MCG TABS tablet Take 1 tablet (500 mcg) by mouth daily (Patient taking differently: Take 500 mcg by mouth every morning) 90 tablet 3    STATIN NOT PRESCRIBED (INTENTIONAL) Please choose reason not prescribed from choices below.         Allergies, family, and social history were reviewed and documented as needed in EHR.     REVIEW OF SYSTEMS  A focused ROS was performed, with pertinent positives and negatives as noted in the HPI.    PHYSICAL EXAM  There were no vitals taken for this visit.  There is no height or weight on file to calculate BMI.  Constitutional: Patient is alert, oriented and appears in no acute distress.  Eyes: Eyes grossly normal to inspection, EOMI, no stare, lid lag, or retraction; no conjunctival injection.  ENMT: Lips are without lesions.   Neck: No visible goiter or neck mass.  Respiratory: Oxygen by nasal cannula.  No audible wheeze or cough. No visible cyanosis. No visible increased work of breathing.  Neurological: Alert and oriented times 3.  Cranial nerves grossly intact.        DATA REVIEW  Each of the following laboratory and/or imaging studies were reviewed.    Component      Latest Ref Rng 10/26/2023  9:12 AM 11/27/2023  8:33 AM 1/18/2024  3:40 PM   WBC      4.0 - 11.0 10e3/uL   6.9    RBC Count       3.80 - 5.20 10e6/uL   5.08    Hemoglobin      11.7 - 15.7 g/dL   13.9    Hematocrit      35.0 - 47.0 %   44.4    MCV      78 - 100 fL   87    MCH      26.5 - 33.0 pg   27.4    MCHC      31.5 - 36.5 g/dL   31.3 (L)    RDW      10.0 - 15.0 %   13.5    Platelet Count      150 - 450 10e3/uL   232    % Neutrophils      %   45    % Lymphocytes      %   36    % Monocytes      %   9    % Eosinophils      %   9    % Basophils      %   1    % Immature Granulocytes      %   0    NRBCs per 100 WBC      <1 /100   0    Absolute Neutrophils      1.6 - 8.3 10e3/uL   3.1    Absolute Lymphocytes      0.8 - 5.3 10e3/uL   2.5    Absolute Monocytes      0.0 - 1.3 10e3/uL   0.6    Absolute Eosinophils      0.0 - 0.7 10e3/uL   0.6    Absolute Basophils      0.0 - 0.2 10e3/uL   0.1    Absolute Immature Granulocytes      <=0.4 10e3/uL   0.0    Absolute NRBCs      10e3/uL   0.0    Sodium      135 - 145 mmol/L   144    Potassium      3.4 - 5.3 mmol/L   3.3 (L)    Carbon Dioxide (CO2)      22 - 29 mmol/L   30 (H)    Anion Gap      7 - 15 mmol/L   10    Urea Nitrogen      8.0 - 23.0 mg/dL   5.7 (L)    Creatinine      0.51 - 0.95 mg/dL   0.61    GFR Estimate      >60 mL/min/1.73m2   >90    Calcium      8.8 - 10.2 mg/dL   9.4    Chloride      98 - 107 mmol/L   104    Glucose      70 - 99 mg/dL   93    Alkaline Phosphatase      40 - 150 U/L   101    AST      0 - 45 U/L   17    ALT      0 - 50 U/L   9    Protein Total      6.4 - 8.3 g/dL   7.4    Albumin      3.5 - 5.2 g/dL   4.4    Bilirubin Total      <=1.2 mg/dL   0.2    Cholesterol      <200 mg/dL  166     Triglycerides      <150 mg/dL  64     HDL Cholesterol      >=50 mg/dL  76     LDL Cholesterol Calculated      <=100 mg/dL  77     Non HDL Cholesterol      <130 mg/dL  90     TSH      0.30 - 4.20 uIU/mL 3.79      Hemoglobin A1C      <5.7 %  8.1 (H)        Legend:  (H) High  (L) Low      ASSESSMENT  1.  Diabetes mellitus, type 2.  No CGM data however hemoglobin A1c in 11/2023 was trending up.   Just started Trulicity: We will continue her current regimen without changes for now.  Can consider titrating Trulicity dose upward in the future as tolerated.  We will check A1c next month.  She will update me if having difficulty with delivery of Dexcom CGM supplies.    2.  Diabetes preventive care.  -Abnormal foot exam on 10/27/2023, foot care discussed at that visit, management of diabetic neuropathy as below  -Microalbuminuria persistent on labs in 8/2023; on SGLT2 inhibitor, her prior records from [facility] indicate cough with lisinopril; would consider addition of ARB (since we do not have blood pressure data for today's visit, will message PCP to see if ARB can be added and perhaps amlodipine dose adjusted depending on blood pressure at her upcoming PCP visit in 4/2024)  -Eye exam on 10/4/2023 showed no diabetic retinopathy    3.  Diabetic neuropathy.  On Lyrica.  Also taking ALA supplement.  B12 level checked in 4/2023 was borderline and we started supplementation.  Follow-up level in 8/2023 was improved.    4.  Dyslipidemia.  We initiated statin therapy in 10/2023.  However, this was discontinued due to increasing muscle aches at her MTM visit in 1/2024.  Now on ezetimibe, which she is tolerating without side effects.    PLAN  -Continue Jardiance and Trulicity without changes  -Contact pharmacy regarding Dexcom continuous glucose monitor--if additional information is needed from our clinic, please contact our clinic  -Labs in early March 2024  -I will check in with Mitzi Nettles about potentially adding a medication (possibly losartan) once blood pressure is checked at visit; in that case, other blood pressure medication may need to be adjusted  -Follow-up in July (virtual visit or in person), again in November (in person if possible)  -We will communicate results via amiando, or if needed by phone      Orders Placed This Encounter   Procedures    Hemoglobin A1c    Basic metabolic panel        Video start time: 10:34 AM  Video end time: 10:48 AM    Physician location: On site    Video platform: [employer]    I spent a total of 41 minutes on the date of encounter reviewing medical records, evaluating the patient, coordinating care and documenting in the EHR, as detailed above.      La Nena Shabazz MD   Division of Diabetes, Endocrinology and Metabolism  Department of Medicine

## 2024-02-07 NOTE — LETTER
2/7/2024         RE: Jenn Aparicio  [address]  [place] [address]        Dear Colleague,    Thank you for referring your patient, Jenn Aparicio, to the [facility]. Please see a copy of my visit note below.      ENDOCRINOLOGY VIDEO FOLLOW-UP        HISTORY OF PRESENT ILLNESS    Jenn Aparicio is seen in follow-up via a billable video visit.    Diagnosed with pneumonia in the interim since last visit: Was treated with antibiotics but did not require steroids.  She has had to increase supplemental oxygen.    She had been on Ozempic at the time of our last visit in 10/2023: She adjusted dose to 0.5 mg weekly but contacted our clinic in 12/2023 with increasing headache, nausea and muscle cramping with the dose change.  We therefore discontinued Ozempic and the patient notes her symptoms have improved.    She was subsequently seen in MTM and an alternate GLP-1 was proposed: She started Trulicity 0.75 mg weekly earlier this week.  Thus far, she has tolerated this well.    Current diabetes regimen:  -Jardiance 25 mg daily  -Trulicity 0.75 mg weekly    Since Freestyle Marti was causing issues (not adhering to skin) we changed to Dexcom CGM after last visit.  This has been adhering more consistently.  However, there was a pharmacy error and she received a supply of marti: She is waiting for delivery of Dexcom supplies.  Therefore, she does not have glucose data presently.    Atorvastatin was also changed to ezetimibe at her MTM visit in 1/2024 due to increased muscle soreness.    Pertinent endocrine and related history:  1.  Diabetes mellitus type 2.  -Ms. Aparicio is uncertain when she was diagnosed with diabetes: Progress Notes as far back as 2008 mention diagnosis of type 2 diabetes.  She previously was seen in endocrinology at [facility].    Past diabetes medications:  -Metformin, developed diarrhea, has tried both extended release and short acting formulations  -Januvia  appeared on medication list previously, does not recall side effect with this  -Insulin, recalls developing hypoglycemia prompting discontinuation  2.  Diabetic peripheral neuropathy.    Her medical history is also notable for COPD (on home oxygen), hypertension, GERD with dysphagia, history of right lower lobe adenocarcinoma of the lung (post lobectomy in 2016) and right upper lobe non-small cell lung cancer (post radiation therapy in 2020).  A right upper lobe lung nodule has been noted to be increasing in size on chest CT.    PAST MEDICAL HISTORY  Past Medical History:   Diagnosis Date     Adenocarcinoma, lung (H)      Asthma      Ectopic pregnancy      Esophageal reflux      Pulmonary emphysema (H)     Very severe FEV1<30% predicted     Type II diabetes mellitus (H)        MEDICATIONS  Current Outpatient Medications   Medication Sig Dispense Refill     acetylcysteine (MUCOMYST) 10 % nebulizer solution Inhale 4 mLs into the lungs 4 times daily (Patient taking differently: Inhale 4 mLs into the lungs 4 times daily as needed) 480 mL 0     albuterol (PROAIR HFA/PROVENTIL HFA/VENTOLIN HFA) 108 (90 Base) MCG/ACT inhaler USE 1 OR 2 INHALATIONS EVERY 4 HOURS AS NEEDED 17 g 2     albuterol (PROVENTIL) (2.5 MG/3ML) 0.083% neb solution Take 1 vial (2.5 mg) by nebulization 4 times daily 360 mL 0     alpha-lipoic acid 600 MG capsule Take 1 capsule (600 mg) by mouth daily (Patient taking differently: Take 600 mg by mouth every morning) 90 capsule 3     amLODIPine (NORVASC) 10 MG tablet Take 1 tablet (10 mg) by mouth daily (Patient taking differently: Take 10 mg by mouth every evening) 90 tablet 3     aspirin 81 MG EC tablet Take 1 tablet (81 mg) by mouth every morning 90 tablet 3     buPROPion (WELLBUTRIN XL) 150 MG 24 hr tablet Take 1 tablet (150 mg) by mouth every morning 14 tablet 0     capsaicin (ZOSTRIX) 0.025 % external cream Apply a thin amount topically 3 times daily as needed (neuropathy in feet.) 25 g 11      carboxymethylcellulose (REFRESH LIQUIGEL) 1 % ophthalmic solution Apply 1 drop to eye 4 times daily 15 mL 4     cetirizine (ZYRTEC) 10 MG tablet Take 1 tablet (10 mg) by mouth every morning 30 tablet 11     Continuous Blood Gluc  (DEXCOM G7 ) JOSEPHINE Use to read blood sugars as per 's instructions. 1 each 0     Continuous Blood Gluc Sensor (DEXCOM G7 SENSOR) MISC Change every 10 days. 3 each 5     Continuous Blood Gluc Sensor (FREESTYLE GIOVANNI 2 SENSOR) MISC 1 each every 14 days For use with Freestyle Giovanni 2  for continuous monitioring of blood glucose levels. Replace sensor every 14 days. 2 each 11     cyanocobalamin (VITAMIN B-12) 500 MCG tablet Take 1 tablet (500 mcg) by mouth daily (Patient taking differently: Take 500 mcg by mouth every morning) 90 tablet 1     dulaglutide (TRULICITY) 0.75 MG/0.5ML pen Inject 0.75 mg Subcutaneous every 7 days 2 mL 1     empagliflozin (JARDIANCE) 25 MG TABS tablet Take 1 tablet (25 mg) by mouth daily (Patient taking differently: Take 25 mg by mouth every morning) 90 tablet 0     EPINEPHrine (ANY BX GENERIC EQUIV) 0.3 MG/0.3ML injection 2-pack Inject 0.3 mLs (0.3 mg) into the muscle as needed for anaphylaxis (related to bee stings) May repeat one time in 5-15 minutes if response to initial dose is inadequate. 2 each 1     ezetimibe (ZETIA) 10 MG tablet Take 1 tablet (10 mg) by mouth daily (Patient taking differently: Take 10 mg by mouth every morning) 90 tablet 3     fluticasone (FLONASE) 50 MCG/ACT nasal spray Spray 1 spray into both nostrils 2 times daily Use at night before bed 15.8 mL 3     Fluticasone-Umeclidin-Vilanterol (TRELEGY ELLIPTA) 200-62.5-25 MCG/ACT oral inhaler USE 1 INHALATION DAILY 28 each 5     GLUCOSAMINE-CHONDROITIN -400 MG tablet TAKE 1 TABLET DAILY (Patient taking differently: Take 1 tablet by mouth every evening) 90 tablet 3     ipratropium - albuterol 0.5 mg/2.5 mg/3 mL (DUONEB) 0.5-2.5 (3) MG/3ML neb solution Take 1  vial (3 mLs) by nebulization every 6 hours as needed for shortness of breath, wheezing or cough 90 mL 1     nicotine (NICODERM CQ) 14 MG/24HR 24 hr patch Place 1 patch onto the skin every 24 hours       olopatadine (PATADAY) 0.2 % ophthalmic solution Place 0.05 mLs (1 drop) into both eyes daily 2.5 mL 11     omega-3 acid ethyl esters (LOVAZA) 1 g capsule Take 2 capsules (2 g) by mouth 2 times daily (Patient taking differently: Take 1 g by mouth 2 times daily Take 2 capsules (2 g) by mouth 2 times daily) 360 capsule 3     omeprazole (PRILOSEC) 40 MG DR capsule Take 1 capsule (40 mg) by mouth 2 times daily for 180 days 180 capsule 1     potassium chloride ER (KLOR-CON M) 20 MEQ CR tablet Take 1 tablet (20 mEq) by mouth daily 4 tablet 0     pregabalin (LYRICA) 150 MG capsule TAKE 1 CAPSULE(150 MG) BY MOUTH TWICE DAILY (Patient taking differently: Take 150 mg by mouth 2 times daily TAKE 1 CAPSULE(150 MG) BY MOUTH TWICE DAILY) 270 capsule 1     roflumilast (DALIRESP) 500 MCG TABS tablet Take 1 tablet (500 mcg) by mouth daily (Patient taking differently: Take 500 mcg by mouth every morning) 90 tablet 3     STATIN NOT PRESCRIBED (INTENTIONAL) Please choose reason not prescribed from choices below.         Allergies, family, and social history were reviewed and documented as needed in EHR.     REVIEW OF SYSTEMS  A focused ROS was performed, with pertinent positives and negatives as noted in the HPI.    PHYSICAL EXAM  There were no vitals taken for this visit.  There is no height or weight on file to calculate BMI.  Constitutional: Patient is alert, oriented and appears in no acute distress.  Eyes: Eyes grossly normal to inspection, EOMI, no stare, lid lag, or retraction; no conjunctival injection.  ENMT: Lips are without lesions.   Neck: No visible goiter or neck mass.  Respiratory: Oxygen by nasal cannula.  No audible wheeze or cough. No visible cyanosis. No visible increased work of breathing.  Neurological: Alert and  oriented times 3.  Cranial nerves grossly intact.        DATA REVIEW  Each of the following laboratory and/or imaging studies were reviewed.    Component      Latest Ref Rng 10/26/2023  9:12 AM 11/27/2023  8:33 AM 1/18/2024  3:40 PM   WBC      4.0 - 11.0 10e3/uL   6.9    RBC Count      3.80 - 5.20 10e6/uL   5.08    Hemoglobin      11.7 - 15.7 g/dL   13.9    Hematocrit      35.0 - 47.0 %   44.4    MCV      78 - 100 fL   87    MCH      26.5 - 33.0 pg   27.4    MCHC      31.5 - 36.5 g/dL   31.3 (L)    RDW      10.0 - 15.0 %   13.5    Platelet Count      150 - 450 10e3/uL   232    % Neutrophils      %   45    % Lymphocytes      %   36    % Monocytes      %   9    % Eosinophils      %   9    % Basophils      %   1    % Immature Granulocytes      %   0    NRBCs per 100 WBC      <1 /100   0    Absolute Neutrophils      1.6 - 8.3 10e3/uL   3.1    Absolute Lymphocytes      0.8 - 5.3 10e3/uL   2.5    Absolute Monocytes      0.0 - 1.3 10e3/uL   0.6    Absolute Eosinophils      0.0 - 0.7 10e3/uL   0.6    Absolute Basophils      0.0 - 0.2 10e3/uL   0.1    Absolute Immature Granulocytes      <=0.4 10e3/uL   0.0    Absolute NRBCs      10e3/uL   0.0    Sodium      135 - 145 mmol/L   144    Potassium      3.4 - 5.3 mmol/L   3.3 (L)    Carbon Dioxide (CO2)      22 - 29 mmol/L   30 (H)    Anion Gap      7 - 15 mmol/L   10    Urea Nitrogen      8.0 - 23.0 mg/dL   5.7 (L)    Creatinine      0.51 - 0.95 mg/dL   0.61    GFR Estimate      >60 mL/min/1.73m2   >90    Calcium      8.8 - 10.2 mg/dL   9.4    Chloride      98 - 107 mmol/L   104    Glucose      70 - 99 mg/dL   93    Alkaline Phosphatase      40 - 150 U/L   101    AST      0 - 45 U/L   17    ALT      0 - 50 U/L   9    Protein Total      6.4 - 8.3 g/dL   7.4    Albumin      3.5 - 5.2 g/dL   4.4    Bilirubin Total      <=1.2 mg/dL   0.2    Cholesterol      <200 mg/dL  166     Triglycerides      <150 mg/dL  64     HDL Cholesterol      >=50 mg/dL  76     LDL Cholesterol Calculated       <=100 mg/dL  77     Non HDL Cholesterol      <130 mg/dL  90     TSH      0.30 - 4.20 uIU/mL 3.79      Hemoglobin A1C      <5.7 %  8.1 (H)        Legend:  (H) High  (L) Low      ASSESSMENT  1.  Diabetes mellitus, type 2.  No CGM data however hemoglobin A1c in 11/2023 was trending up.  Just started Trulicity: We will continue her current regimen without changes for now.  Can consider titrating Trulicity dose upward in the future as tolerated.  We will check A1c next month.  She will update me if having difficulty with delivery of Dexcom CGM supplies.    2.  Diabetes preventive care.  -Abnormal foot exam on 10/27/2023, foot care discussed at that visit, management of diabetic neuropathy as below  -Microalbuminuria persistent on labs in 8/2023; on SGLT2 inhibitor, her prior records from [facility] indicate cough with lisinopril; would consider addition of ARB (since we do not have blood pressure data for today's visit, will message PCP to see if ARB can be added and perhaps amlodipine dose adjusted depending on blood pressure at her upcoming PCP visit in 4/2024)  -Eye exam on 10/4/2023 showed no diabetic retinopathy    3.  Diabetic neuropathy.  On Lyrica.  Also taking ALA supplement.  B12 level checked in 4/2023 was borderline and we started supplementation.  Follow-up level in 8/2023 was improved.    4.  Dyslipidemia.  We initiated statin therapy in 10/2023.  However, this was discontinued due to increasing muscle aches at her MTM visit in 1/2024.  Now on ezetimibe, which she is tolerating without side effects.    PLAN  -Continue Jardiance and Trulicity without changes  -Contact pharmacy regarding Dexcom continuous glucose monitor--if additional information is needed from our clinic, please contact our clinic  -Labs in early March 2024  -I will check in with Mitzi Nettles about potentially adding a medication (possibly losartan) once blood pressure is checked at visit; in that case, other blood pressure  medication may need to be adjusted  -Follow-up in July (virtual visit or in person), again in November (in person if possible)  -We will communicate results via MyChart, or if needed by phone      Orders Placed This Encounter   Procedures     Hemoglobin A1c     Basic metabolic panel       Video start time: 10:34 AM  Video end time: 10:48 AM    Physician location: On site    Video platform: [employer] App    I spent a total of 41 minutes on the date of encounter reviewing medical records, evaluating the patient, coordinating care and documenting in the EHR, as detailed above.      Juan Shabazz MD   Division of Diabetes, Endocrinology and Metabolism  Department of Medicine      Again, thank you for allowing me to participate in the care of your patient.        Sincerely,        JUAN Shabazz MD

## 2024-02-07 NOTE — PATIENT INSTRUCTIONS
-Continue Jardiance and Trulicity without changes  -Contact pharmacy regarding Dexcom continuous glucose monitor--if additional information is needed from our clinic, please contact our clinic  -Labs in early March 2024  -I will check in with Mitzi Nettles about potentially adding a medication (possibly losartan) once blood pressure is checked at visit; in that case, other blood pressure medication may need to be adjusted  -Follow-up in July (virtual visit or in person), again in November (in person if possible)  -We will communicate results via [employer], or if needed by phone

## 2024-02-08 ENCOUNTER — VIRTUAL VISIT (OUTPATIENT)
Dept: PHARMACY | Facility: CLINIC | Age: 69
End: 2024-02-08
Payer: COMMERCIAL

## 2024-02-08 DIAGNOSIS — E11.42 DIABETIC POLYNEUROPATHY ASSOCIATED WITH TYPE 2 DIABETES MELLITUS (H): ICD-10-CM

## 2024-02-08 DIAGNOSIS — E11.9 TYPE 2 DIABETES MELLITUS WITHOUT COMPLICATION, WITHOUT LONG-TERM CURRENT USE OF INSULIN (H): ICD-10-CM

## 2024-02-08 DIAGNOSIS — F17.210 CIGARETTE SMOKER: ICD-10-CM

## 2024-02-08 DIAGNOSIS — E78.5 HYPERLIPIDEMIA, UNSPECIFIED HYPERLIPIDEMIA TYPE: Primary | ICD-10-CM

## 2024-02-08 DIAGNOSIS — I10 HYPERTENSION, UNSPECIFIED TYPE: ICD-10-CM

## 2024-02-08 PROCEDURE — 99606 MTMS BY PHARM EST 15 MIN: CPT | Mod: 93 | Performed by: PHARMACIST

## 2024-02-08 PROCEDURE — 99607 MTMS BY PHARM ADDL 15 MIN: CPT | Mod: 93 | Performed by: PHARMACIST

## 2024-02-08 RX ORDER — ATORVASTATIN CALCIUM 10 MG/1
10 TABLET, FILM COATED ORAL DAILY
Qty: 90 TABLET | Refills: 3 | Status: SHIPPED | OUTPATIENT
Start: 2024-02-08 | End: 2024-07-09

## 2024-02-08 RX ORDER — LOSARTAN POTASSIUM 50 MG/1
50 TABLET ORAL DAILY
Qty: 90 TABLET | Refills: 3 | Status: SHIPPED | OUTPATIENT
Start: 2024-02-08 | End: 2024-07-09

## 2024-02-08 RX ORDER — DULOXETIN HYDROCHLORIDE 30 MG/1
CAPSULE, DELAYED RELEASE ORAL
Qty: 60 CAPSULE | Refills: 1 | Status: SHIPPED | OUTPATIENT
Start: 2024-02-08 | End: 2024-05-03

## 2024-02-08 NOTE — PROGRESS NOTES
Medication Therapy Management (MTM) Encounter    ASSESSMENT:                            Medication Adherence/Access: No issues identified      Diabetes   Will need blood glucose readings to full assess effect of Trulicity.       Smoking Cessation:   Congratulated patient on cessation        Hyperlipidemia:  Since going off statin there was no changein side effects leading me to believe her symptoms are not statin related. Will resume statin due to diabetes diagnosis.     Pain:  Since she is hae more pain in her feet she should look to follow-up with her PCP and to start duloxetine to hopefully help with the nerve pain.     Hypertension/CAD prevention:   Since her urine albumin is elevated she would benefit from switching her blood pressure agent that would assist with kidney help. She may also notice some reduced ankle swelling with discontinuation of amlodipine. Will need a Basic Metabolic Panel after starting.     PLAN:                            Switch back from Zetia to atorvastatin.   Due to high albumin in urine lets change your amlodipine to losartan 50 mg daily  Get Basic Metabolic Panel drawn in 2-4 weeks after starting losartan  Since you are having more pain please call the clinic to move up your primary care appointment.   Start duloxetine 30 mg daily and increase to 60 mg daily after 1 week if tolerating.     Follow-up: Return in about 4 weeks (around 3/7/2024) for Follow up, with me.        SUBJECTIVE/OBJECTIVE:                          Jenn Aparicio is a 68 year old female called for a follow-up visit from 1/11.       Reason for visit: MTM follow-up.    Allergies/ADRs: Reviewed in chart  Past Medical History: Reviewed in chart  Tobacco: She reports that she quit smoking about 13 months ago. Her smoking use included cigarettes. She smoked an average of 0.3 packs per day. She has been exposed to tobacco smoke. She has never used smokeless tobacco.  Alcohol: none    Medication Adherence/Access: no issues  reported    Diabetes   Jardiance 25 mg daily  Trulicity 0.75 mg once weekly - started this month  Aspirin 81 mg daily for primary prevention.     Patient is not experiencing side effects.  Blood sugar monitoring: CGM. Ranges (patient reported):   Hasn't been checking because she hasn't been able to get her sensors.   Symptoms of low blood sugar? reports sometimes she gets shaky  Symptoms of high blood sugar? none  Eye exam: due  Foot exam: due    Statin: no is taking Zetia  ACEi/ARB: No.          Eye exam is up to date  Foot exam is up to date  Urine Albumin:   Lab Results   Component Value Date    UMALCR 271.48 (H) 08/23/2023      Lab Results   Component Value Date    A1C 8.1 (H) 11/27/2023         Smoking Cessation:   Patient has quit smoking and is no longer on any smoking cessation aids.         Hyperlipidemia:  Ezetemibe 10 mg once daily    Change did not change any of her symptoms.      Reports since starting this she has had some insomnia. Also has had some muscle soreness when she is moving around and this is newer            Recent Labs   Lab Test 11/27/23  0833 10/26/23  0912   CHOL 166 220*   HDL 76 55   LDL 77 146*   TRIG 64 96      Pain:  Alpha lipoic acid daily  Lyrica 150 mg twice a day  Glucosamine-chondroitin daily  Capsaicin cream - did not help and just burned her feet and does not seem to be helping.      Reports her feet have been really bad.     Hypertension/CAD prevention:   amlodipine 10 mg daily  aspirin 81 mg.    Fish oil twice a day      Patient does not self-monitor blood pressure.    Patient reports the following medication side effects:swelling in ankles.   [facility] indicate cough with lisinopril     BP Readings from Last 3 Encounters:   01/18/24 117/73   01/18/24 117/73   12/18/23 (!) 140/68       Pulse Readings from Last 3 Encounters:   01/18/24 90   01/18/24 90   12/18/23 97           Today's Vitals: There were no vitals taken for this visit.  ----------------      I spent  20 minutes with this patient today. All changes were made via collaborative practice agreement with BENOIT Ruiz CNP. A copy of the visit note was provided to the patient's provider(s).    A summary of these recommendations was sent via [employer].    Samy Prasad, Pharm. D., [facility]  Medication Therapy Management Pharmacist      Telemedicine Visit Details  Type of service:  Telephone visit  Start Time:  9 am  End Time:  920 am     Medication Therapy Recommendations  Diabetic neuropathy (H)    Current Medication: pregabalin (LYRICA) 150 MG capsule   Rationale: Synergistic therapy - Needs additional medication therapy - Indication   Recommendation: Start Medication - DULOXETINE HCL PO   Status: Accepted per CPA         Hyperlipidemia, unspecified hyperlipidemia type    Current Medication: ezetimibe (ZETIA) 10 MG tablet (Discontinued)   Rationale: More effective medication available - Ineffective medication - Effectiveness   Recommendation: Change Medication - ATORVASTATIN CALCIUM PO   Status: Accepted per CPA         Hypertension, unspecified type    Current Medication: amLODIPine (NORVASC) 10 MG tablet   Rationale: More effective medication available - Ineffective medication - Effectiveness   Recommendation: Change Medication - LOSARTAN POTASSIUM PO   Status: Accepted per CPA

## 2024-02-09 NOTE — PATIENT INSTRUCTIONS
"Recommendations from today's MTM visit:                                                    MTM (medication therapy management) is a service provided by a clinical pharmacist designed to help you get the most of out of your medicines.   Today we reviewed what your medicines are for, how to know if they are working, that your medicines are safe and how to make your medicine regimen as easy as possible.    Switch back from Zetia to atorvastatin.   Due to high albumin in urine lets change your amlodipine to losartan 50 mg daily  Get Basic Metabolic Panel drawn in 2-4 weeks after starting losartan  Since you are having more pain please call the clinic to move up your primary care appointment.   Start duloxetine 30 mg daily and increase to 60 mg daily after 1 week if tolerating.     Follow-up: Return in about 4 weeks (around 3/7/2024) for Follow up, with me.    It was great speaking with you today.  I value your experience and would be very thankful for your time in providing feedback in our clinic survey. In the next few days, you may receive an email or text message from [employer] with a link to a survey related to your  clinical pharmacist.\"     To schedule another MTM appointment, please call the clinic directly or you may call the MTM scheduling line at [phone] or toll-free at [phone].     My Clinical Pharmacist's contact information:                                                      Please feel free to contact me with any questions or concerns you have.      Samy Prasad, Pharm. D., [facility]  Medication Therapy Management Pharmacist    "

## 2024-02-16 DIAGNOSIS — E11.40 DIABETIC NEUROPATHY (H): ICD-10-CM

## 2024-02-16 RX ORDER — PERPHENAZINE 16 MG
600 TABLET ORAL DAILY
Qty: 90 CAPSULE | Refills: 3 | Status: SHIPPED | OUTPATIENT
Start: 2024-02-16 | End: 2024-04-09

## 2024-02-16 NOTE — TELEPHONE ENCOUNTER
Spoke with pt. She switched the pharmacy from [facility] to [facility] and wants to have refills of alpha-lipoic-acid 600 mg. This refill was sent to [facility].

## 2024-02-16 NOTE — TELEPHONE ENCOUNTER
[facility] Call Center    Phone Message    May a detailed message be left on voicemail: yes     Reason for Call: The patient would like a call from the care team to review all the recent medication sent to her pharmacy earlier this month. The patient said the pharmacy was supposed to call us about a few other medications to explain why they getting denied, please review and follow up with patient because she's struggling to get this resolved thank you.    Action Taken: Message routed to:  [facility]    Travel Screening: Not Applicable

## 2024-03-07 ENCOUNTER — VIRTUAL VISIT (OUTPATIENT)
Dept: PHARMACY | Facility: CLINIC | Age: 69
End: 2024-03-07
Payer: COMMERCIAL

## 2024-03-07 ENCOUNTER — LAB (OUTPATIENT)
Dept: LAB | Facility: CLINIC | Age: 69
End: 2024-03-07
Payer: COMMERCIAL

## 2024-03-07 DIAGNOSIS — E11.65 TYPE 2 DIABETES MELLITUS WITH HYPERGLYCEMIA, WITHOUT LONG-TERM CURRENT USE OF INSULIN (H): ICD-10-CM

## 2024-03-07 DIAGNOSIS — I10 HYPERTENSION, UNSPECIFIED TYPE: ICD-10-CM

## 2024-03-07 DIAGNOSIS — E78.5 HYPERLIPIDEMIA, UNSPECIFIED HYPERLIPIDEMIA TYPE: ICD-10-CM

## 2024-03-07 DIAGNOSIS — R52 PAIN: ICD-10-CM

## 2024-03-07 DIAGNOSIS — E11.9 TYPE 2 DIABETES MELLITUS WITHOUT COMPLICATION, WITHOUT LONG-TERM CURRENT USE OF INSULIN (H): Primary | ICD-10-CM

## 2024-03-07 LAB
ANION GAP SERPL CALCULATED.3IONS-SCNC: 11 MMOL/L (ref 7–15)
BUN SERPL-MCNC: 5.2 MG/DL (ref 8–23)
CALCIUM SERPL-MCNC: 9 MG/DL (ref 8.8–10.2)
CHLORIDE SERPL-SCNC: 100 MMOL/L (ref 98–107)
CREAT SERPL-MCNC: 0.55 MG/DL (ref 0.51–0.95)
DEPRECATED HCO3 PLAS-SCNC: 29 MMOL/L (ref 22–29)
EGFRCR SERPLBLD CKD-EPI 2021: >90 ML/MIN/1.73M2
GLUCOSE SERPL-MCNC: 100 MG/DL (ref 70–99)
HBA1C MFR BLD: 8.1 %
POTASSIUM SERPL-SCNC: 3 MMOL/L (ref 3.4–5.3)
SODIUM SERPL-SCNC: 140 MMOL/L (ref 135–145)

## 2024-03-07 PROCEDURE — 99606 MTMS BY PHARM EST 15 MIN: CPT | Mod: 93 | Performed by: PHARMACIST

## 2024-03-07 PROCEDURE — 99607 MTMS BY PHARM ADDL 15 MIN: CPT | Mod: 93 | Performed by: PHARMACIST

## 2024-03-07 PROCEDURE — 80048 BASIC METABOLIC PNL TOTAL CA: CPT | Performed by: PATHOLOGY

## 2024-03-07 PROCEDURE — 83036 HEMOGLOBIN GLYCOSYLATED A1C: CPT | Performed by: INTERNAL MEDICINE

## 2024-03-07 PROCEDURE — 99000 SPECIMEN HANDLING OFFICE-LAB: CPT | Performed by: PATHOLOGY

## 2024-03-07 PROCEDURE — 36415 COLL VENOUS BLD VENIPUNCTURE: CPT | Performed by: PATHOLOGY

## 2024-03-07 RX ORDER — TIRZEPATIDE 2.5 MG/.5ML
2.5 INJECTION, SOLUTION SUBCUTANEOUS
Qty: 2 ML | Refills: 0 | Status: SHIPPED | OUTPATIENT
Start: 2024-03-07 | End: 2024-06-07

## 2024-03-07 NOTE — PROGRESS NOTES
Medication Therapy Management (MTM) Encounter    ASSESSMENT:                            Medication Adherence/Access: No issues identified     Diabetes:   Patient is not meeting A1c goal of < 7%.  Patient is meeting goal of > 70% time in target with continuous glucose monitoring.  She would benefit from uploading her freestyle marlon data to [employer].  Since she is having side effects to Trulicity she would also benefit from trying 1 more GLP-1 agonist.  If she still does not tolerate this we will plan to switch her to a DPP 4 inhibitor.    Hypertension:   Patient had an adverse event from taking both losartan and amlodipine when she was instructed to stop the amlodipine and start taking losartan.  Will have her do this at this time.  If she is having low blood pressure symptoms on just the losartan she can split that in half.    Hyperlipidemia:   Stable.    Pain:   Improving    PLAN:                            Stop amlodipine and only take losartan 50 mg daily.  Okay to split tablet in half if you have side effects.  Getting labs done today  Change Trulicity to Mounjaro 2.5 mg once weekly - stop Mounjaro if it gives you similar side effects.   I sent you information via email on how to upload your blood sugars so that I can see them.    Follow-up: Return in about 29 days (around 4/5/2024) for Follow up, with me, using a phone visit.    SUBJECTIVE/OBJECTIVE:                          Jenn Aparicio is a 68 year old female called for a follow-up visit from 2/8.       Reason for visit: MTM follow-up.    Allergies/ADRs: Reviewed in chart  Past Medical History: Reviewed in chart  Tobacco: She reports that she quit smoking about 14 months ago. Her smoking use included cigarettes. She smoked an average of 0.3 packs per day. She has been exposed to tobacco smoke. She has never used smokeless tobacco.  Alcohol: none    Medication Adherence/Access: no issues reported    Diabetes   Jardiance 25 mg daily  Trulicity 0.75 mg once  weekly - has had some nausea which is off and on that is bothersome    Metformin, developed diarrhea, has tried both extended release and short acting formulations      Blood sugar monitoring: CGM. Ranges (patient reported):   Reports she has been around 150 mg/dL. Reports she is at 200 mg/dL during the visit.   Last 7 days: Avg 145 mg/dL  In range: 99% Low 1%  Last 14 days: Ibq705 mg/dL  Eye exam: due  Foot exam: due    Statin: atorvastatin       Eye exam is up to date  Foot exam is up to date  Urine Albumin:   Lab Results   Component Value Date    UMALCR 271.48 (H) 08/23/2023      Lab Results   Component Value Date    A1C 8.1 (H) 11/27/2023       Hypertension   amlodipine 10 mg daily    Reports she tried losartan and was having a lot of side effects of head spinning so she stopped it. She did not stop the amlodipine as instructed when she started the losartan.      Patient reports that her blood pressure dropped to 89 mmHg. Hasn't checked since.    Patient reports the following medication side effects:swelling in ankles.   [facility] indicate cough with lisinopril          BP Readings from Last 3 Encounters:   01/18/24 117/73   01/18/24 117/73   12/18/23 (!) 140/68     Pulse Readings from Last 3 Encounters:   01/18/24 90   01/18/24 90   12/18/23 97       Hyperlipidemia   Atorvastatin 10 mg daily     Change did not change any of her symptoms.          Recent Labs   Lab Test 11/27/23  0833 10/26/23  0912   CHOL 166 220*   HDL 76 55   LDL 77 146*   TRIG 64 96       Pain:  Alpha lipoic acid daily  Lyrica 150 mg twice a day  Duloxetine 60 mg daily - report he has noticed some improvement  Glucosamine-chondroitin daily  Capsaicin cream - did not help and just burned her feet and does not seem to be helping.      Reports her feet still do hurt    Today's Vitals: There were no vitals taken for this visit.  ----------------      I spent 17 minutes with this patient today. All changes were made via collaborative  practice agreement with BENOIT Ruiz CNP. A copy of the visit note was provided to the patient's provider(s).    A summary of these recommendations was sent via [employer].    Samy Prasad, Pharm. D., [facility]CP  Medication Therapy Management Pharmacist    Telemedicine Visit Details  Type of service:  Telephone visit  Start Time:  9:01 AM  End Time:  9:18 AM     Medication Therapy Recommendations  Hypertension, unspecified type    Current Medication: amLODIPine (NORVASC) 10 MG tablet (Discontinued)   Rationale: Does not understand instructions - Adherence - Adherence   Recommendation: Discontinue Medication   Status: Accepted - no CPA Needed         Type 2 diabetes mellitus without complication, without long-term current use of insulin (H)    Current Medication: dulaglutide (TRULICITY) 0.75 MG/0.5ML pen (Discontinued)   Rationale: Undesirable effect - Adverse medication event - Safety   Recommendation: Change Medication - MOUNJARO SC   Status: Accepted per CPA

## 2024-03-08 RX ORDER — BISACODYL 5 MG/1
TABLET, DELAYED RELEASE ORAL
Qty: 4 TABLET | Refills: 0 | Status: SHIPPED | OUTPATIENT
Start: 2024-03-08 | End: 2024-04-09

## 2024-03-09 ENCOUNTER — TELEPHONE (OUTPATIENT)
Dept: GASTROENTEROLOGY | Facility: CLINIC | Age: 69
End: 2024-03-09

## 2024-03-09 NOTE — TELEPHONE ENCOUNTER
Pre visit planning completed.      Procedure details:    Patient scheduled for Colonoscopy  on 3/21/24.     Arrival time: 0630. Procedure time 0800    Pre op exam needed? N/A    Facility location: [facility]; [address] [place] [address], [address] Floor, [place] [address]    Sedation type: MAC    Indication for procedure: Adenomatous polyp of colon, unspecified part of colon       Chart review:     Electronic implanted devices? No    Recent diagnosis of diverticulitis within the last 6 weeks? No    Diabetic? Yes. See medication holding recommendations.     Diabetic medication HOLDING recommendations: (if applicable)  Oral diabetic medications: Yes:  Jardiance (empagliflozin): HOLD  3 days before procedure.  Diabetic injectables: Yes- Mounjaro (Tirzepatide).  Weekly dosing of medication.  Hold 7 days before procedure.  Follow up with managing provider.   Insulin: No      Medication review:    Anticoagulants? No    NSAIDS? No    Other medication HOLDING recommendations:  N/A      Prep for procedure:     Bowel prep recommendation: Standard Golytely. Bowel prep prescription sent to    [facility] #[id number], [place] - [address] AT [facility] OF  [address]     Due to: diabetes.     Prep instructions sent via [employer]         Patria Howe RN  Endoscopy Procedure Pre Assessment RN  [phone] option 4

## 2024-03-11 ENCOUNTER — TELEPHONE (OUTPATIENT)
Dept: GASTROENTEROLOGY | Facility: CLINIC | Age: 69
End: 2024-03-11
Payer: COMMERCIAL

## 2024-03-11 DIAGNOSIS — E87.6 HYPOKALEMIA: ICD-10-CM

## 2024-03-11 DIAGNOSIS — Z12.11 SPECIAL SCREENING FOR MALIGNANT NEOPLASMS, COLON: Primary | ICD-10-CM

## 2024-03-11 RX ORDER — POTASSIUM CHLORIDE 1500 MG/1
40 TABLET, EXTENDED RELEASE ORAL DAILY
Qty: 6 TABLET | Refills: 0 | Status: SHIPPED | OUTPATIENT
Start: 2024-03-11 | End: 2024-03-14

## 2024-03-11 NOTE — TELEPHONE ENCOUNTER
Writer called pt who states she would like to reschedule this appt.  Pt was warm transferred to . Sana Cummings RN

## 2024-03-11 NOTE — TELEPHONE ENCOUNTER
Caller: Jenn Aparicio     Reason for Reschedule/Cancellation   (please be detailed, any staff messages or encounters to note?): no  and she is fine waiting until next available at [facility]      Prior to reschedule please review:  Ordering Provider: Yoon  Sedation Determined: mac  Does patient have any ASC Exclusions, please identify?: y aden      Notes on Cancelled Procedure:  Procedure: Lower Endoscopy [Colonoscopy]   Date: 3/21  Location: [facility]; [address] [facility], [place] [address]   Surgeon: Finn      Rescheduled: Yes,   Procedure: Lower Endoscopy [Colonoscopy]    Date: 9/5   Location: [facility]; [address], [place] [address]    Surgeon: Lanre   Sedation Level Scheduled  mac,  Reason for Sedation Level ordered   Instructions updated and sent: y    Does patient need PAC or Pre -Op Rescheduled? : no       Did you cancel or rescheduled an EUS procedure? No.

## 2024-03-14 DIAGNOSIS — E11.65 TYPE 2 DIABETES MELLITUS WITH HYPERGLYCEMIA, WITHOUT LONG-TERM CURRENT USE OF INSULIN (H): Primary | ICD-10-CM

## 2024-03-20 ENCOUNTER — ANCILLARY PROCEDURE (OUTPATIENT)
Dept: GENERAL RADIOLOGY | Facility: CLINIC | Age: 69
End: 2024-03-20
Payer: COMMERCIAL

## 2024-03-20 ENCOUNTER — OFFICE VISIT (OUTPATIENT)
Dept: PULMONOLOGY | Facility: CLINIC | Age: 69
End: 2024-03-20
Attending: STUDENT IN AN ORGANIZED HEALTH CARE EDUCATION/TRAINING PROGRAM
Payer: COMMERCIAL

## 2024-03-20 VITALS
HEIGHT: 66 IN | BODY MASS INDEX: 33.39 KG/M2 | HEART RATE: 98 BPM | OXYGEN SATURATION: 97 % | DIASTOLIC BLOOD PRESSURE: 74 MMHG | RESPIRATION RATE: 18 BRPM | SYSTOLIC BLOOD PRESSURE: 137 MMHG

## 2024-03-20 DIAGNOSIS — J44.9 CHRONIC OBSTRUCTIVE PULMONARY DISEASE, UNSPECIFIED COPD TYPE (H): ICD-10-CM

## 2024-03-20 DIAGNOSIS — J43.9 PULMONARY EMPHYSEMA, UNSPECIFIED EMPHYSEMA TYPE (H): ICD-10-CM

## 2024-03-20 DIAGNOSIS — J18.9 COMMUNITY ACQUIRED PNEUMONIA, UNSPECIFIED LATERALITY: Primary | ICD-10-CM

## 2024-03-20 DIAGNOSIS — J96.21 ACUTE AND CHRONIC RESPIRATORY FAILURE WITH HYPOXIA (H): ICD-10-CM

## 2024-03-20 DIAGNOSIS — J18.9 COMMUNITY ACQUIRED PNEUMONIA, UNSPECIFIED LATERALITY: ICD-10-CM

## 2024-03-20 LAB
C PNEUM DNA SPEC QL NAA+PROBE: NOT DETECTED
FLUAV H1 2009 PAND RNA SPEC QL NAA+PROBE: NOT DETECTED
FLUAV H1 RNA SPEC QL NAA+PROBE: NOT DETECTED
FLUAV H3 RNA SPEC QL NAA+PROBE: NOT DETECTED
FLUAV RNA SPEC QL NAA+PROBE: NOT DETECTED
FLUBV RNA SPEC QL NAA+PROBE: NOT DETECTED
HADV DNA SPEC QL NAA+PROBE: NOT DETECTED
HCOV PNL SPEC NAA+PROBE: NOT DETECTED
HMPV RNA SPEC QL NAA+PROBE: NOT DETECTED
HPIV1 RNA SPEC QL NAA+PROBE: NOT DETECTED
HPIV2 RNA SPEC QL NAA+PROBE: NOT DETECTED
HPIV3 RNA SPEC QL NAA+PROBE: NOT DETECTED
HPIV4 RNA SPEC QL NAA+PROBE: NOT DETECTED
M PNEUMO DNA SPEC QL NAA+PROBE: NOT DETECTED
RSV RNA SPEC QL NAA+PROBE: NOT DETECTED
RSV RNA SPEC QL NAA+PROBE: NOT DETECTED
RV+EV RNA SPEC QL NAA+PROBE: NOT DETECTED

## 2024-03-20 PROCEDURE — G0463 HOSPITAL OUTPT CLINIC VISIT: HCPCS | Performed by: STUDENT IN AN ORGANIZED HEALTH CARE EDUCATION/TRAINING PROGRAM

## 2024-03-20 PROCEDURE — 87581 M.PNEUMON DNA AMP PROBE: CPT | Performed by: STUDENT IN AN ORGANIZED HEALTH CARE EDUCATION/TRAINING PROGRAM

## 2024-03-20 PROCEDURE — 87205 SMEAR GRAM STAIN: CPT | Performed by: STUDENT IN AN ORGANIZED HEALTH CARE EDUCATION/TRAINING PROGRAM

## 2024-03-20 PROCEDURE — 71046 X-RAY EXAM CHEST 2 VIEWS: CPT | Mod: GC | Performed by: RADIOLOGY

## 2024-03-20 RX ORDER — ACETYLCYSTEINE 100 MG/ML
4 SOLUTION ORAL; RESPIRATORY (INHALATION) 4 TIMES DAILY PRN
Qty: 30 ML | Refills: 5 | Status: SHIPPED | OUTPATIENT
Start: 2024-03-20

## 2024-03-20 RX ORDER — LEVOFLOXACIN 750 MG/1
750 TABLET, FILM COATED ORAL DAILY
Qty: 7 TABLET | Refills: 0 | Status: SHIPPED | OUTPATIENT
Start: 2024-03-20 | End: 2024-04-05

## 2024-03-20 RX ORDER — ROFLUMILAST 500 UG/1
500 TABLET ORAL EVERY MORNING
Qty: 90 TABLET | Refills: 3 | Status: SHIPPED | OUTPATIENT
Start: 2024-03-20

## 2024-03-20 RX ORDER — PREDNISONE 5 MG/1
TABLET ORAL
Qty: 48 TABLET | Refills: 0 | Status: SHIPPED | OUTPATIENT
Start: 2024-03-20 | End: 2024-04-05

## 2024-03-20 ASSESSMENT — PAIN SCALES - GENERAL: PAINLEVEL: NO PAIN (0)

## 2024-03-20 NOTE — PATIENT INSTRUCTIONS
Start the antibiotic (levofloxacin) and prednisone (steroid) today.  The prednisone will take several days to finish with lower doses every 3 days.  We are getting a new chest x-ray.  I will call you if it shows something concerning.  We are getting cultures to see what kind of pneumonia you might have.  These don't always turn positive even when you have pneumonia.  If it tells us you have a pneumonia that needs a different antibiotic, I will call you.  Do your vest with your nebulizers twice a day to help get the phlegm up.  If you aren't feeling better by Monday, please call our office.  We will want to get a chest CaT scan at that time.    I refilled your roflumilast to your mail-order pharmacy.  I will plan on seeing you back in 3 months, but call if you have issues.    Kailee Moralez MD  Pulmonary / Critical Care Fellow  03/20/24 1:32 PM

## 2024-03-20 NOTE — NURSING NOTE
Chief Complaint   Patient presents with    RECHECK     Return Pulmonary      Medications reviewed and vital signs taken.   Antonio Lorenzana, SHIMA

## 2024-03-20 NOTE — PROGRESS NOTES
[facility]   Pulmonary Clinic  Consult Note   Mar 20, 2024  Jenn Aparicio MRN: [id number]      Assessment & Plan      Jenn Aparicio is a 68 year old female with very severe COPD (FEV1 0.55, Z-4.42 4/2023), RLL adeno s/p lobectomy (2016), RUL NSCLC s/p SBRT (1/2020), ongoing tobacco use, DM2, and HTN who initially established with pulmonary clinic 12/2020 after transferring her oncology care to [facility].  She was last seen 8/2022.  Since that time, she was hospitalized 1/1-1/4/2023, 2/3-2/9/2023, and 2/20-2/24/2023 for severe COPD exacerbations without any overt triggers other than recurrent reflux and thick bronchial secretions.    #Severe COPD (FEV1 0.55, Z-4.42 4/2023) with acute exacerbation  #Chronic hypoxemic hypercapnic respiratory failure (3-4L O2, pVCO2 55-60 baseline)  She has completed 2 courses of prednisone 40 mg (10 days), so will continue this course with slightly lower dose to help her get through this illness.  Not sure what antibiotics that she has received, but she has allergies to penicillin and azithromycin.  She reports likely tolerating levofloxacin in the past, so we will start that.  I have obtained a bacterial and AFB culture to help guide therapy we will also repeat the respiratory viral panel as it is not clear why she is not improving.  It may be that she has this exacerbation due to backing off of her airway clearance, so I counseled her to restart her vest therapy and Mucomyst which I refilled.  I will also check a chest x-ray to evaluate for pneumonia, but she reports having a normal chest x-ray and [facility] (I cannot see this in Care Everywhere).    #RLL adenocarcinoma s/p lobectomy (2/2016)  #RUL NSCLC s/p SBRT (1/2020)  Oncology note from 1/2024 reviewed.  Plan for ongoing imaging monitoring with CT in 3-4 months.  Only therapy that would be offered would be systemic, if needed, so would need biopsy if imaging changes or continues to progress.  Previously had  non-diagnostic EBUS 7/2023.    Recommendations:    - Prolong current prednisone taper:     - 30 mg daily for 3 days then    - 20 mg daily for 3 days then    - 15 mg daily for 3 days then    - 10 mg daily for 3 days then    - 5 mg daily for 3 days   - 2-view CXR   - 7 days of Levofloxacin    - Sputum culture + AFB to guide therapy    - Respiratory viral testing   - Continue Trelegy (LAMA/LABA/ICS)   - Continue secretion management:    - Vest therapy BID PRN    - Mucomyst neb BID PRN   - Duonebs QID PRN   - Nightly NIPPV on hold due to nausea, reflux   - Continue roflumilast 500 mg daily   - COPD home action plan active: prednisone + doxycycline on hand for 5-days (3 refills ordered for the winter)   - RTC 3 months    Patient seen & discussed w/  Dr. Pearce, who agrees with the above assessment and plan.    Kailee Moralez M.D.  Pulmonary and Critical Care Medicine Fellow  03/20/2024             Interval History / Subjective:   Jenn Aparicio is a 68 year old female with severe COPD (FEV1 0.55, Z-4.42 4/2023) c/b chronic hypoxic hypercapnic respiratory failure (4L chronic oxygen), RLL adeno s/p lobectomy (2016), RUL NSCLC s/p SBRT (1/2020), ongoing tobacco use, DM2, and HTN who established with pulmonary clinic 12/2020 after transferring her oncology care to [facility].  She was last seen 12/2023 for close follow-up due to frequent exacerbations and was treated for an exacerbation at that time.  She returns today for 3-month follow up.    Since her last visit, she had been doing fairly well until about 2 to 3 weeks ago.  She had no new admissions and required no other prednisone courses.  However, a couple of weeks ago's, she developed worsening shortness of breath with lots of mucus.  She went to the ED at [facility] and they told her she had pneumonia.  They gave her about a week of prednisone and antibiotics.  She does not member the name of the antibiotic.  Initially helped a little bit, but ultimately she was still  doing poorly.  So after completing those therapies, she used her home action plan antibiotics and 5 more days of prednisone.  Which she has just recently completed prior to today's visit.    She has been using her Trelegy inhaler, Roflumilast, DuoNebs 4 times a day.  She ran out of her Mucomyst.  She has not tried her vest therapy.  She denies fevers, but has some chills.  She knows no sick contacts.  She said they tested her for viruses in the ED and told her they were negative, but she is not sure which ones.          Social/Occupational/Exposures:     Patient is a off-and-on smoker w/ significant second hand exposure in her apartment building who officially quit early .  No known asbestos exposure.  No pets.  No bird exposure.  Significant mold reported in her apartment building.    Social History     Tobacco Use    Smoking status: Former     Packs/day: .25     Types: Cigarettes     Quit date:      Years since quittin.2     Passive exposure: Past    Smokeless tobacco: Never    Tobacco comments:     2023 Patient using 14 mg patch, wants to have prescription for Nicotrol inhaler, took workbook   Substance Use Topics    Alcohol use: Not Currently    Drug use: No             Review of Symptoms:   10-point ROS reviewed, & found negative w/ exceptions noted in the HPI.          Past Medical History:     Past Medical History:   Diagnosis Date    Adenocarcinoma, lung (H)     Asthma     Ectopic pregnancy     Esophageal reflux     Pulmonary emphysema (H)     Very severe FEV1<30% predicted    Type II diabetes mellitus (H)        Past Surgical History:   Procedure Laterality Date    16                R thoracotomy, RLL lobectomy (Dr. Cunha). Adenocarcinoma, 1.1 cm, assoicated with atypical adenomatous hyperplasia Right 2016    R thoracotomy, RLL lobectomy (Dr. Cunha). Adenocarcinoma, 1.1 cm, assoicated with atypical adenomatous hyperplasia    BRONCHOSCOPY, WITH BIOPSY, ROBOT ASSISTED N/A  7/19/2023    Procedure: robot assisted Ion BRONCHOSCOPY, WITH BIOPSY;  Surgeon: Patria Garcia MD;  Location: [facility] OR    ENDOBRONCHIAL ULTRASOUND FLEXIBLE N/A 7/19/2023    Procedure: Endobronchial ultrasound flexible;  Surgeon: Patria Garcia MD;  Location: [facility] OR    ESOPHAGOSCOPY, GASTROSCOPY, DUODENOSCOPY (EGD), COMBINED N/A 8/16/2022    Procedure: ESOPHAGOGASTRODUODENOSCOPY (EGD);  Surgeon: Travis Briseno MD;  Location:  GI    ESOPHAGOSCOPY, GASTROSCOPY, DUODENOSCOPY (EGD), COMBINED N/A 8/8/2023    Procedure: ESOPHAGOGASTRODUODENOSCOPY, WITH BIOPSY;  Surgeon: Chao Rodrigues DO;  Location:  GI    ORTHOPEDIC SURGERY      PHACOEMULSIFICATION CLEAR CORNEA WITH STANDARD INTRAOCULAR LENS IMPLANT Left 8/24/2023    Procedure: LEFT EYE PHACOEMULSIFICATION, CATARACT, WITH INTRAOCULAR LENS IMPLANT;  Surgeon: Re Keita MD;  Location: [facility] OR    PHACOEMULSIFICATION CLEAR CORNEA WITH STANDARD INTRAOCULAR LENS IMPLANT Right 9/5/2023    Procedure: RIGHT EYE PHACOEMULSIFICATION, CATARACT, WITH INTRAOCULAR LENS IMPLANT;  Surgeon: Re Keita MD;  Location: [facility] OR            Allergies:     Allergies   Allergen Reactions    Aspirin      325mg     Bee Venom Anaphylaxis    Penicillins Hives    Azithromycin Dizziness    Colon Care     Interferons Dermatitis             Outpatient Medications:     albuterol (PROAIR HFA/PROVENTIL HFA/VENTOLIN HFA) 108 (90 Base) MCG/ACT inhaler, USE 1 OR 2 INHALATIONS EVERY 4 HOURS AS NEEDED  albuterol (PROVENTIL) (2.5 MG/3ML) 0.083% neb solution, Take 1 vial (2.5 mg) by nebulization 4 times daily  alpha-lipoic acid 600 MG capsule, Take 1 capsule (600 mg) by mouth daily  aspirin 81 MG EC tablet, Take 1 tablet (81 mg) by mouth every morning  atorvastatin (LIPITOR) 10 MG tablet, Take 1 tablet (10 mg) by mouth daily  bisacodyl (DULCOLAX) 5 MG EC tablet, Take 2 tablets at 3 pm the day before your procedure. If your procedure is before 11 am, take 2 additional tablets at 11 pm. If your procedure  is after 11 am, take 2 additional tablets at 6 am. For additional instructions refer to your colonoscopy prep instructions.  capsaicin (ZOSTRIX) 0.025 % external cream, Apply a thin amount topically 3 times daily as needed (neuropathy in feet.)  carboxymethylcellulose (REFRESH LIQUIGEL) 1 % ophthalmic solution, Apply 1 drop to eye 4 times daily  cetirizine (ZYRTEC) 10 MG tablet, Take 1 tablet (10 mg) by mouth every morning  Continuous Blood Gluc  (DEXCOM G7 ) JOSEPHINE, Use to read blood sugars as per 's instructions.  Continuous Blood Gluc Sensor (DEXCOM G7 SENSOR) MISC, Change every 10 days.  Continuous Blood Gluc Sensor (FREESTYLE GIOVANNI 2 SENSOR) [facility], 1 each every 14 days For use with Freestyle Giovanni 2  for continuous monitioring of blood glucose levels. Replace sensor every 14 days.  cyanocobalamin (VITAMIN B-12) 500 MCG tablet, Take 1 tablet (500 mcg) by mouth daily (Patient taking differently: Take 500 mcg by mouth every morning)  DULoxetine (CYMBALTA) 30 MG capsule, Take 1 capsule by mouth for one week and then increase to 2 capsules daily if tolerated.  empagliflozin (JARDIANCE) 25 MG TABS tablet, Take 1 tablet (25 mg) by mouth every morning  EPINEPHrine (ANY BX GENERIC EQUIV) 0.3 MG/0.3ML injection 2-pack, Inject 0.3 mLs (0.3 mg) into the muscle as needed for anaphylaxis (related to bee stings) May repeat one time in 5-15 minutes if response to initial dose is inadequate.  fluticasone (FLONASE) 50 MCG/ACT nasal spray, Spray 1 spray into both nostrils 2 times daily Use at night before bed  Fluticasone-Umeclidin-Vilanterol (TRELEGY ELLIPTA) 200-62.5-25 MCG/ACT oral inhaler, USE 1 INHALATION DAILY  GLUCOSAMINE-CHONDROITIN -400 MG tablet, TAKE 1 TABLET DAILY (Patient taking differently: Take 1 tablet by mouth every evening)  ipratropium - albuterol 0.5 mg/2.5 mg/3 mL (DUONEB) 0.5-2.5 (3) MG/3ML neb solution, Take 1 vial (3 mLs) by nebulization every 6 hours as needed for  shortness of breath, wheezing or cough  losartan (COZAAR) 50 MG tablet, Take 1 tablet (50 mg) by mouth daily  olopatadine (PATADAY) 0.2 % ophthalmic solution, Place 0.05 mLs (1 drop) into both eyes daily  omega-3 acid ethyl esters (LOVAZA) 1 g capsule, Take 2 capsules (2 g) by mouth 2 times daily (Patient taking differently: Take 1 g by mouth 2 times daily Take 2 capsules (2 g) by mouth 2 times daily)  polyethylene glycol (GOLYTELY) 236 g suspension, The night before the exam at 6 pm drink an 8-ounce glass every 15 minutes until the jug is half empty. If you arrive before 11 AM: Drink the other half of the Golytely jug at 11 PM night before procedure. If you arrive after 11 AM: Drink the other half of the Golytely jug at 6 AM day of procedure. For additional instructions refer to your colonoscopy prep instructions.  pregabalin (LYRICA) 150 MG capsule, TAKE 1 CAPSULE(150 MG) BY MOUTH TWICE DAILY (Patient taking differently: Take 150 mg by mouth 2 times daily TAKE 1 CAPSULE(150 MG) BY MOUTH TWICE DAILY)  STATIN NOT PRESCRIBED (INTENTIONAL), Please choose reason not prescribed from choices below.  tirzepatide (MOUNJARO) 2.5 MG/0.5ML pen, Inject 2.5 mg Subcutaneous every 7 days    No current facility-administered medications on file prior to visit.            Family History:     Family History   Problem Relation Age of Onset    Thyroid Disease Mother     Cerebrovascular Disease Mother     Hypertension Mother     Hypertension Father     Glaucoma Father     Cancer Sister     Lung Cancer Sister     Diabetes Brother     Cancer Brother     Diabetes Brother     Cancer Brother     Diabetes Brother     Deep Vein Thrombosis (DVT) Daughter     Depression Daughter     Alcohol/Drug Other         self    Diabetes Other         self    Thyroid Disease Other         self    Asthma Other         self    Macular Degeneration No family hx of     Anesthesia Reaction No family hx of                Physical Exam:   /74   Pulse 98    "Resp 18   Ht 1.676 m (5' 6\")   SpO2 97%   BMI 33.39 kg/m      General: adult female, using a walker, appears stated age  HENT: external ears without visible abnormalities, no rhinorrhea, no epistaxis, wearing a mask  Lungs: diffuse rhonchi, wheezing in RUL and LLL, on NC 3L during appointment, no accessory muscle use  Heart: RRR, no murmurs  Extremities: trace bilateral pitting edema, bilateral clubbing, no cyanosis  Skin: no visible rashes, no mottling, scattered ecchymoses in various stages of healing  Neurologic: moving all 4 extremities spontaneously, awake and alert  Psych: full affect, appropriate insight, appropriate judgment          Data:   Labs: notable labs in HPI above.    CO2 (4/17/23): 36 -> (11/27/23) 31  Alpha-1 Antitrypsin (2/22/2023): 149, normal M1M1 phenotype    Imaging and other diagnostic testing (all imaging studies reviewed by me)    CT chest 12/12/2023: RUL masslike consolidation appears larger and more dense compared to prior; diffuse emphysema stable though severe  CT chest 3/22/2023: RUL nodules stable compared to 3/1/2023; ROBERT mucus plugging    PFTs 12/2020:        PFTs 4/2023: FEV1 0.55 (-4.42), FVC 1.36 (-3.64), 0.40 -> severe obstruction, stable compared to 12/2020    6MWT 8/10/2022: 540 feet, 97% -> 90% on room air (previous walk in 12/2020 for 480 feet)    Transthoracic echocardiogram (9/2022): EF 55-60%, normal RV, cannot assess PASP, normal RA pressure  "

## 2024-03-20 NOTE — PROGRESS NOTES
[facility]   Pulmonary Clinic  Consult Note 12/13/23  Jenn Aparicio MRN: [id number]      Assessment & Plan      Jenn Aparicio is a 68 year old female with very severe COPD (FEV1 0.55, Z-4.42 4/2023), RLL adeno s/p lobectomy (2016), RUL NSCLC s/p SBRT (1/2020), ongoing tobacco use, DM2, and HTN who initially established with pulmonary clinic 12/2020 after transferring her oncology care to [facility].  She was last seen 8/2022.  Since that time, she was hospitalized 1/1-1/4/2023, 2/3-2/9/2023, and 2/20-2/24/2023 for severe COPD exacerbations without any overt triggers other than recurrent reflux and thick bronchial secretions.    #Severe COPD (FEV1 0.55, Z-4.42 4/2023) with acute exacerbation  #Chronic hypoxemic hypercapnic respiratory failure (4L O2, pVCO2 55-60 baseline)  Last exacerbation requiring steroids was 3 weeks ago.  Last exacerbation requiring hospitalization was 2/2023.  Current exacerbation likely due to her not using her secretion management therapies, no history of illness.  CT consolidation more concerning for malignancy, given prior work up and therapies.  She should remain on NIPPV, if she can tolerate it from a nausea/reflux standpoint.  I do worry that her exacerbations are driven a least in part by recurrent reflux symptoms.    #RLL adenocarcinoma s/p lobectomy (2/2016)  #RUL NSCLC s/p SBRT (1/2020)  Oncology note from 10/2023 reviewed.  Plan for follow up clinic visit today to determine next steps in the setting of enlarging masslike consolidation in RUL.  Only therapy that would be offered would be systemic, if needed.  EBUS from 7/19/2023 without positive lymph nodes, but scant cellularity.    Recommendations:    - Prolong current prednisone taper:     - 40 mg for 3 additional days then    - 30 mg daily for 3 days then    - 20 mg daily for 3 days then    - 10 mg daily for 3 days   - Continue Trelegy (LAMA/LABA/ICS)   - Restart secretion management:    - Vest therapy BID PRN    - Mucomyst neb  BID PRN   - Duonebs QID PRN   - Nightly NIPPV on hold due to nausea, reflux   - Continue roflumilast 500 mg daily   - COPD home action plan active: prednisone + doxycycline on hand for 5-days (3 refills ordered for the winter)   - RSV vaccine when feeling well   - RTC 3 months    Patient seen & discussed w/  Dr. Bond, who agrees with the above assessment and plan.    Kailee Moralez M.D.  Pulmonary and Critical Care Medicine Fellow  03/20/2024             Interval History / Subjective:   Jenn Aparicio is a 68 year old female with severe COPD (FEV1 0.55, Z-4.42 4/2023) c/b chronic hypoxic hypercapnic respiratory failure (4L chronic oxygen), RLL adeno s/p lobectomy (2016), RUL NSCLC s/p SBRT (1/2020), ongoing tobacco use, DM2, and HTN who established with pulmonary clinic 12/2020 after transferring her oncology care to [facility].  She was last seen 8/2023 after being hospitalized 1/1-1/4/2023, 2/3-2/9/2023, and 2/20-2/24/2023 for severe COPD exacerbations without any overt triggers.  She returns today for 3-month follow up.    Since her last visit, she followed up with oncology due to enlarging RUL masslike consolidation.  Dr. Leung was concerned for recurrence of cancer at the site of prior SBRT, but with negative EBUS was awaiting follow up CT which was done yesterday.  The patient is scheduled to follow up in oncology clinic this afternoon.    She was seen in GI clinic by a PA on 9/18/2023 and at that time had significant improvement in her dysphagia and GERD symptoms.  She is scheduled to follow up with GI again next week.  If her symptoms worsen, they are considering repeat esophageal dilation.    She reports doing well until about 3 weeks ago when she developed worsening dyspnea and increased sputum production.  She took her home action plan which helped some but did not totally resolve her symptoms.  She just finished the course about a week ago.  She denies fevers, sick contacts.  She wears a mask around her  apartment complex.    She reports symptoms of a yeast infection since starting her prednisone course.  She also reports poor appetite, increased fatigue, and sleepiness which she attributes to starting Ozempic.    She is using her Trelegy inhaler daily, her Duonebs twice a day, and is not sure that she has received her roflumilast.  She was using her vest and Mucomyst until about a month ago.  It was helping  a lot with her sputum mobilization.  She still has the equipment and materials at home.  She is not using her Bipap due to persistent nausea and reflux at night.          Social/Occupational/Exposures:     Patient is a off-and-on smoker w/ significant second hand exposure in her apartment building.  No known asbestos exposure.  No pets.  No bird exposure.  Significant mold reported in her apartment building.    Social History     Tobacco Use    Smoking status: Former     Packs/day: .25     Types: Cigarettes     Quit date:      Years since quittin.2     Passive exposure: Past    Smokeless tobacco: Never    Tobacco comments:     2023 Patient using 14 mg patch, wants to have prescription for Nicotrol inhaler, took workbook   Substance Use Topics    Alcohol use: Not Currently    Drug use: No             Review of Symptoms:   10-point ROS reviewed, & found negative w/ exceptions noted in the HPI.          Past Medical History:     Past Medical History:   Diagnosis Date    Adenocarcinoma, lung (H)     Asthma     Ectopic pregnancy     Esophageal reflux     Pulmonary emphysema (H)     Very severe FEV1<30% predicted    Type II diabetes mellitus (H)        Past Surgical History:   Procedure Laterality Date    16                R thoracotomy, RLL lobectomy (Dr. Cunha). Adenocarcinoma, 1.1 cm, assoicated with atypical adenomatous hyperplasia Right 2016    R thoracotomy, RLL lobectomy (Dr. Cunha). Adenocarcinoma, 1.1 cm, assoicated with atypical adenomatous hyperplasia    BRONCHOSCOPY, WITH  BIOPSY, ROBOT ASSISTED N/A 7/19/2023    Procedure: robot assisted Ion BRONCHOSCOPY, WITH BIOPSY;  Surgeon: Patria Garcia MD;  Location: [facility] OR    ENDOBRONCHIAL ULTRASOUND FLEXIBLE N/A 7/19/2023    Procedure: Endobronchial ultrasound flexible;  Surgeon: Patria Garcia MD;  Location:  OR    ESOPHAGOSCOPY, GASTROSCOPY, DUODENOSCOPY (EGD), COMBINED N/A 8/16/2022    Procedure: ESOPHAGOGASTRODUODENOSCOPY (EGD);  Surgeon: Travis Briseno MD;  Location:  GI    ESOPHAGOSCOPY, GASTROSCOPY, DUODENOSCOPY (EGD), COMBINED N/A 8/8/2023    Procedure: ESOPHAGOGASTRODUODENOSCOPY, WITH BIOPSY;  Surgeon: Chao Rodrigues DO;  Location:  GI    ORTHOPEDIC SURGERY      PHACOEMULSIFICATION CLEAR CORNEA WITH STANDARD INTRAOCULAR LENS IMPLANT Left 8/24/2023    Procedure: LEFT EYE PHACOEMULSIFICATION, CATARACT, WITH INTRAOCULAR LENS IMPLANT;  Surgeon: Re Keita MD;  Location: [facility] OR    PHACOEMULSIFICATION CLEAR CORNEA WITH STANDARD INTRAOCULAR LENS IMPLANT Right 9/5/2023    Procedure: RIGHT EYE PHACOEMULSIFICATION, CATARACT, WITH INTRAOCULAR LENS IMPLANT;  Surgeon: Re Keita MD;  Location: [facility] OR            Allergies:     Allergies   Allergen Reactions    Aspirin      325mg     Bee Venom Anaphylaxis    Penicillins Hives    Azithromycin Dizziness    Colon Care     Interferons Dermatitis             Outpatient Medications:     acetylcysteine (MUCOMYST) 10 % nebulizer solution, Inhale 4 mLs into the lungs 4 times daily (Patient taking differently: Inhale 4 mLs into the lungs 4 times daily as needed)  albuterol (PROAIR HFA/PROVENTIL HFA/VENTOLIN HFA) 108 (90 Base) MCG/ACT inhaler, USE 1 OR 2 INHALATIONS EVERY 4 HOURS AS NEEDED  albuterol (PROVENTIL) (2.5 MG/3ML) 0.083% neb solution, Take 1 vial (2.5 mg) by nebulization 4 times daily  alpha-lipoic acid 600 MG capsule, Take 1 capsule (600 mg) by mouth daily  aspirin 81 MG EC tablet, Take 1 tablet (81 mg) by mouth every morning  atorvastatin (LIPITOR) 10 MG tablet, Take 1 tablet  (10 mg) by mouth daily  bisacodyl (DULCOLAX) 5 MG EC tablet, Take 2 tablets at 3 pm the day before your procedure. If your procedure is before 11 am, take 2 additional tablets at 11 pm. If your procedure is after 11 am, take 2 additional tablets at 6 am. For additional instructions refer to your colonoscopy prep instructions.  capsaicin (ZOSTRIX) 0.025 % external cream, Apply a thin amount topically 3 times daily as needed (neuropathy in feet.)  carboxymethylcellulose (REFRESH LIQUIGEL) 1 % ophthalmic solution, Apply 1 drop to eye 4 times daily  cetirizine (ZYRTEC) 10 MG tablet, Take 1 tablet (10 mg) by mouth every morning  Continuous Blood Gluc  (DEXCOM G7 ) JOSEPHINE, Use to read blood sugars as per 's instructions.  Continuous Blood Gluc Sensor (DEXCOM G7 SENSOR) MISC, Change every 10 days.  Continuous Blood Gluc Sensor (FREESTYLE GIOVANNI 2 SENSOR) MISC, 1 each every 14 days For use with Freestyle Giovanni 2  for continuous monitioring of blood glucose levels. Replace sensor every 14 days.  cyanocobalamin (VITAMIN B-12) 500 MCG tablet, Take 1 tablet (500 mcg) by mouth daily (Patient taking differently: Take 500 mcg by mouth every morning)  DULoxetine (CYMBALTA) 30 MG capsule, Take 1 capsule by mouth for one week and then increase to 2 capsules daily if tolerated.  empagliflozin (JARDIANCE) 25 MG TABS tablet, Take 1 tablet (25 mg) by mouth every morning  EPINEPHrine (ANY BX GENERIC EQUIV) 0.3 MG/0.3ML injection 2-pack, Inject 0.3 mLs (0.3 mg) into the muscle as needed for anaphylaxis (related to bee stings) May repeat one time in 5-15 minutes if response to initial dose is inadequate.  fluticasone (FLONASE) 50 MCG/ACT nasal spray, Spray 1 spray into both nostrils 2 times daily Use at night before bed  Fluticasone-Umeclidin-Vilanterol (TRELEGY ELLIPTA) 200-62.5-25 MCG/ACT oral inhaler, USE 1 INHALATION DAILY  GLUCOSAMINE-CHONDROITIN -400 MG tablet, TAKE 1 TABLET DAILY (Patient taking  differently: Take 1 tablet by mouth every evening)  ipratropium - albuterol 0.5 mg/2.5 mg/3 mL (DUONEB) 0.5-2.5 (3) MG/3ML neb solution, Take 1 vial (3 mLs) by nebulization every 6 hours as needed for shortness of breath, wheezing or cough  losartan (COZAAR) 50 MG tablet, Take 1 tablet (50 mg) by mouth daily  olopatadine (PATADAY) 0.2 % ophthalmic solution, Place 0.05 mLs (1 drop) into both eyes daily  omega-3 acid ethyl esters (LOVAZA) 1 g capsule, Take 2 capsules (2 g) by mouth 2 times daily (Patient taking differently: Take 1 g by mouth 2 times daily Take 2 capsules (2 g) by mouth 2 times daily)  polyethylene glycol (GOLYTELY) 236 g suspension, The night before the exam at 6 pm drink an 8-ounce glass every 15 minutes until the jug is half empty. If you arrive before 11 AM: Drink the other half of the Golytely jug at 11 PM night before procedure. If you arrive after 11 AM: Drink the other half of the Golytely jug at 6 AM day of procedure. For additional instructions refer to your colonoscopy prep instructions.  pregabalin (LYRICA) 150 MG capsule, TAKE 1 CAPSULE(150 MG) BY MOUTH TWICE DAILY (Patient taking differently: Take 150 mg by mouth 2 times daily TAKE 1 CAPSULE(150 MG) BY MOUTH TWICE DAILY)  roflumilast (DALIRESP) 500 MCG TABS tablet, Take 1 tablet (500 mcg) by mouth daily (Patient taking differently: Take 500 mcg by mouth every morning)  STATIN NOT PRESCRIBED (INTENTIONAL), Please choose reason not prescribed from choices below.  tirzepatide (MOUNJARO) 2.5 MG/0.5ML pen, Inject 2.5 mg Subcutaneous every 7 days    No current facility-administered medications on file prior to visit.            Family History:     Family History   Problem Relation Age of Onset    Thyroid Disease Mother     Cerebrovascular Disease Mother     Hypertension Mother     Hypertension Father     Glaucoma Father     Cancer Sister     Lung Cancer Sister     Diabetes Brother     Cancer Brother     Diabetes Brother     Cancer Brother      "Diabetes Brother     Deep Vein Thrombosis (DVT) Daughter     Depression Daughter     Alcohol/Drug Other         self    Diabetes Other         self    Thyroid Disease Other         self    Asthma Other         self    Macular Degeneration No family hx of     Anesthesia Reaction No family hx of                Physical Exam:   /74   Pulse 98   Resp 18   Ht 1.676 m (5' 6\")   SpO2 97%   BMI 33.39 kg/m      General: adult female, using a walker, appears stated age  HENT: external ears without visible abnormalities, no rhinorrhea, no epistaxis, wearing a mask  Lungs: poor air movement in all lung fields, no wheezing, on NC 0-3L during appointment, no accessory muscle use  Heart: RRR, no murmurs  Extremities: trace bilateral pitting edema, bilateral clubbing, no cyanosis  Skin: no visible rashes, no mottling, scattered ecchymoses in various stages of healing  Neurologic: moving all 4 extremities spontaneously, awake and alert  Psych: full affect, appropriate insight, appropriate judgment          Data:   Labs: notable labs in HPI above.    CO2 (4/17/23): 36 -> (11/27/23) 31  Alpha-1 Antitrypsin (2/22/2023): 149, normal M1M1 phenotype    Imaging and other diagnostic testing (all imaging studies reviewed by me)    CT chest 12/12/2023: RUL masslike consolidation appears larger and more dense compared to prior; diffuse emphysema stable though severe  CT chest 3/22/2023: RUL nodules stable compared to 3/1/2023; ROBERT mucus plugging    PFTs 12/2020:        PFTs 4/2023: FEV1 0.55 (-4.42), FVC 1.36 (-3.64), 0.40 -> severe obstruction, stable compared to 12/2020    6MWT 8/10/2022: 540 feet, 97% -> 90% on room air (previous walk in 12/2020 for 480 feet)    Transthoracic echocardiogram (9/2022): EF 55-60%, normal RV, cannot assess PASP, normal RA pressure  "

## 2024-03-20 NOTE — LETTER
3/20/2024         RE: Jenn Aparicio  [address]  [place] [address]        Dear Colleague,    Thank you for referring your patient, Jenn Aparicio, to the [facility] [place]. Please see a copy of my visit note below.    [facility]   Pulmonary Clinic  Consult Note 12/13/23  Jenn Aparicio MRN: [id number]      Assessment & Plan     Jenn Aparicio is a 68 year old female with very severe COPD (FEV1 0.55, Z-4.42 4/2023), RLL adeno s/p lobectomy (2016), RUL NSCLC s/p SBRT (1/2020), ongoing tobacco use, DM2, and HTN who initially established with pulmonary clinic 12/2020 after transferring her oncology care to [facility].  She was last seen 8/2022.  Since that time, she was hospitalized 1/1-1/4/2023, 2/3-2/9/2023, and 2/20-2/24/2023 for severe COPD exacerbations without any overt triggers other than recurrent reflux and thick bronchial secretions.    #Severe COPD (FEV1 0.55, Z-4.42 4/2023) with acute exacerbation  #Chronic hypoxemic hypercapnic respiratory failure (3-4L O2, pVCO2 55-60 baseline)  She has completed 2 courses of prednisone 40 mg (10 days), so will continue this course with slightly lower dose to help her get through this illness.  Not sure what antibiotics that she has received, but she has allergies to penicillin and azithromycin.  She reports likely tolerating levofloxacin in the past, so we will start that.  I have obtained a bacterial and AFB culture to help guide therapy we will also repeat the respiratory viral panel as it is not clear why she is not improving.  It may be that she has this exacerbation due to backing off of her airway clearance, so I counseled her to restart her vest therapy and Mucomyst which I refilled.  I will also check a chest x-ray to evaluate for pneumonia, but she reports having a normal chest x-ray and [facility] (I cannot see this in Care Everywhere).    #RLL adenocarcinoma s/p lobectomy (2/2016)  #RUL  NSCLC s/p SBRT (1/2020)  Oncology note from 1/2024 reviewed.  Plan for ongoing imaging monitoring with CT in 3-4 months.  Only therapy that would be offered would be systemic, if needed, so would need biopsy if imaging changes or continues to progress.  Previously had non-diagnostic EBUS 7/2023.    Recommendations:    - Prolong current prednisone taper:     - 30 mg daily for 3 days then    - 20 mg daily for 3 days then    - 15 mg daily for 3 days then    - 10 mg daily for 3 days then    - 5 mg daily for 3 days   - 2-view CXR   - 7 days of Levofloxacin    - Sputum culture + AFB to guide therapy    - Respiratory viral testing   - Continue Trelegy (LAMA/LABA/ICS)   - Continue secretion management:    - Vest therapy BID PRN    - Mucomyst neb BID PRN   - Duonebs QID PRN   - Nightly NIPPV on hold due to nausea, reflux   - Continue roflumilast 500 mg daily   - COPD home action plan active: prednisone + doxycycline on hand for 5-days (3 refills ordered for the winter)   - RTC 3 months    Patient seen & discussed w/  Dr. Pearce, who agrees with the above assessment and plan.    Kailee Moralez M.D.  Pulmonary and Critical Care Medicine Fellow  03/20/2024             Interval History / Subjective:   Jenn Aparicio is a 68 year old female with severe COPD (FEV1 0.55, Z-4.42 4/2023) c/b chronic hypoxic hypercapnic respiratory failure (4L chronic oxygen), RLL adeno s/p lobectomy (2016), RUL NSCLC s/p SBRT (1/2020), ongoing tobacco use, DM2, and HTN who established with pulmonary clinic 12/2020 after transferring her oncology care to [facility].  She was last seen 12/2023 for close follow-up due to frequent exacerbations and was treated for an exacerbation at that time.  She returns today for 3-month follow up.    Since her last visit, she had been doing fairly well until about 2 to 3 weeks ago.  She had no new admissions and required no other prednisone courses.  However, a couple of weeks ago's, she developed worsening shortness of  breath with lots of mucus.  She went to the ED at [facility] and they told her she had pneumonia.  They gave her about a week of prednisone and antibiotics.  She does not member the name of the antibiotic.  Initially helped a little bit, but ultimately she was still doing poorly.  So after completing those therapies, she used her home action plan antibiotics and 5 more days of prednisone.  Which she has just recently completed prior to today's visit.    She has been using her Trelegy inhaler, Roflumilast, DuoNebs 4 times a day.  She ran out of her Mucomyst.  She has not tried her vest therapy.  She denies fevers, but has some chills.  She knows no sick contacts.  She said they tested her for viruses in the ED and told her they were negative, but she is not sure which ones.          Social/Occupational/Exposures:     Patient is a off-and-on smoker w/ significant second hand exposure in her apartment building who officially quit early .  No known asbestos exposure.  No pets.  No bird exposure.  Significant mold reported in her apartment building.    Social History     Tobacco Use     Smoking status: Former     Packs/day: .25     Types: Cigarettes     Quit date:      Years since quittin.2     Passive exposure: Past     Smokeless tobacco: Never     Tobacco comments:     2023 Patient using 14 mg patch, wants to have prescription for Nicotrol inhaler, took workbook   Substance Use Topics     Alcohol use: Not Currently     Drug use: No             Review of Symptoms:   10-point ROS reviewed, & found negative w/ exceptions noted in the HPI.          Past Medical History:     Past Medical History:   Diagnosis Date     Adenocarcinoma, lung (H)      Asthma      Ectopic pregnancy      Esophageal reflux      Pulmonary emphysema (H)     Very severe FEV1<30% predicted     Type II diabetes mellitus (H)        Past Surgical History:   Procedure Laterality Date     16                R thoracotomy, RLL  lobectomy (Dr. Cunha). Adenocarcinoma, 1.1 cm, assoicated with atypical adenomatous hyperplasia Right 02/08/2016    R thoracotomy, RLL lobectomy (Dr. Cunha). Adenocarcinoma, 1.1 cm, assoicated with atypical adenomatous hyperplasia     BRONCHOSCOPY, WITH BIOPSY, ROBOT ASSISTED N/A 7/19/2023    Procedure: robot assisted Ion BRONCHOSCOPY, WITH BIOPSY;  Surgeon: Patria Garcia MD;  Location: [facility] OR     ENDOBRONCHIAL ULTRASOUND FLEXIBLE N/A 7/19/2023    Procedure: Endobronchial ultrasound flexible;  Surgeon: Patria Garcia MD;  Location:  OR     ESOPHAGOSCOPY, GASTROSCOPY, DUODENOSCOPY (EGD), COMBINED N/A 8/16/2022    Procedure: ESOPHAGOGASTRODUODENOSCOPY (EGD);  Surgeon: Travis Briseno MD;  Location:  GI     ESOPHAGOSCOPY, GASTROSCOPY, DUODENOSCOPY (EGD), COMBINED N/A 8/8/2023    Procedure: ESOPHAGOGASTRODUODENOSCOPY, WITH BIOPSY;  Surgeon: Caho Rodrigues DO;  Location:  GI     ORTHOPEDIC SURGERY       PHACOEMULSIFICATION CLEAR CORNEA WITH STANDARD INTRAOCULAR LENS IMPLANT Left 8/24/2023    Procedure: LEFT EYE PHACOEMULSIFICATION, CATARACT, WITH INTRAOCULAR LENS IMPLANT;  Surgeon: Re Keita MD;  Location: [facility] OR     PHACOEMULSIFICATION CLEAR CORNEA WITH STANDARD INTRAOCULAR LENS IMPLANT Right 9/5/2023    Procedure: RIGHT EYE PHACOEMULSIFICATION, CATARACT, WITH INTRAOCULAR LENS IMPLANT;  Surgeon: Re Keita MD;  Location: [facility] OR            Allergies:     Allergies   Allergen Reactions     Aspirin      325mg      Bee Venom Anaphylaxis     Penicillins Hives     Azithromycin Dizziness     Colon Care      Interferons Dermatitis             Outpatient Medications:     albuterol (PROAIR HFA/PROVENTIL HFA/VENTOLIN HFA) 108 (90 Base) MCG/ACT inhaler, USE 1 OR 2 INHALATIONS EVERY 4 HOURS AS NEEDED  albuterol (PROVENTIL) (2.5 MG/3ML) 0.083% neb solution, Take 1 vial (2.5 mg) by nebulization 4 times daily  alpha-lipoic acid 600 MG capsule, Take 1 capsule (600 mg) by mouth daily  aspirin 81 MG EC tablet, Take  1 tablet (81 mg) by mouth every morning  atorvastatin (LIPITOR) 10 MG tablet, Take 1 tablet (10 mg) by mouth daily  bisacodyl (DULCOLAX) 5 MG EC tablet, Take 2 tablets at 3 pm the day before your procedure. If your procedure is before 11 am, take 2 additional tablets at 11 pm. If your procedure is after 11 am, take 2 additional tablets at 6 am. For additional instructions refer to your colonoscopy prep instructions.  capsaicin (ZOSTRIX) 0.025 % external cream, Apply a thin amount topically 3 times daily as needed (neuropathy in feet.)  carboxymethylcellulose (REFRESH LIQUIGEL) 1 % ophthalmic solution, Apply 1 drop to eye 4 times daily  cetirizine (ZYRTEC) 10 MG tablet, Take 1 tablet (10 mg) by mouth every morning  Continuous Blood Gluc  (DEXCOM G7 ) JOSEPHINE, Use to read blood sugars as per 's instructions.  Continuous Blood Gluc Sensor (DEXCOM G7 SENSOR) MISC, Change every 10 days.  Continuous Blood Gluc Sensor (FREESTYLE GIOVANNI 2 SENSOR) [facility], 1 each every 14 days For use with Freestyle Giovanni 2  for continuous monitioring of blood glucose levels. Replace sensor every 14 days.  cyanocobalamin (VITAMIN B-12) 500 MCG tablet, Take 1 tablet (500 mcg) by mouth daily (Patient taking differently: Take 500 mcg by mouth every morning)  DULoxetine (CYMBALTA) 30 MG capsule, Take 1 capsule by mouth for one week and then increase to 2 capsules daily if tolerated.  empagliflozin (JARDIANCE) 25 MG TABS tablet, Take 1 tablet (25 mg) by mouth every morning  EPINEPHrine (ANY BX GENERIC EQUIV) 0.3 MG/0.3ML injection 2-pack, Inject 0.3 mLs (0.3 mg) into the muscle as needed for anaphylaxis (related to bee stings) May repeat one time in 5-15 minutes if response to initial dose is inadequate.  fluticasone (FLONASE) 50 MCG/ACT nasal spray, Spray 1 spray into both nostrils 2 times daily Use at night before bed  Fluticasone-Umeclidin-Vilanterol (TRELEGY ELLIPTA) 200-62.5-25 MCG/ACT oral inhaler, USE 1  INHALATION DAILY  GLUCOSAMINE-CHONDROITIN -400 MG tablet, TAKE 1 TABLET DAILY (Patient taking differently: Take 1 tablet by mouth every evening)  ipratropium - albuterol 0.5 mg/2.5 mg/3 mL (DUONEB) 0.5-2.5 (3) MG/3ML neb solution, Take 1 vial (3 mLs) by nebulization every 6 hours as needed for shortness of breath, wheezing or cough  losartan (COZAAR) 50 MG tablet, Take 1 tablet (50 mg) by mouth daily  olopatadine (PATADAY) 0.2 % ophthalmic solution, Place 0.05 mLs (1 drop) into both eyes daily  omega-3 acid ethyl esters (LOVAZA) 1 g capsule, Take 2 capsules (2 g) by mouth 2 times daily (Patient taking differently: Take 1 g by mouth 2 times daily Take 2 capsules (2 g) by mouth 2 times daily)  polyethylene glycol (GOLYTELY) 236 g suspension, The night before the exam at 6 pm drink an 8-ounce glass every 15 minutes until the jug is half empty. If you arrive before 11 AM: Drink the other half of the Golytely jug at 11 PM night before procedure. If you arrive after 11 AM: Drink the other half of the Golytely jug at 6 AM day of procedure. For additional instructions refer to your colonoscopy prep instructions.  pregabalin (LYRICA) 150 MG capsule, TAKE 1 CAPSULE(150 MG) BY MOUTH TWICE DAILY (Patient taking differently: Take 150 mg by mouth 2 times daily TAKE 1 CAPSULE(150 MG) BY MOUTH TWICE DAILY)  STATIN NOT PRESCRIBED (INTENTIONAL), Please choose reason not prescribed from choices below.  tirzepatide (MOUNJARO) 2.5 MG/0.5ML pen, Inject 2.5 mg Subcutaneous every 7 days    No current facility-administered medications on file prior to visit.            Family History:     Family History   Problem Relation Age of Onset     Thyroid Disease Mother      Cerebrovascular Disease Mother      Hypertension Mother      Hypertension Father      Glaucoma Father      Cancer Sister      Lung Cancer Sister      Diabetes Brother      Cancer Brother      Diabetes Brother      Cancer Brother      Diabetes Brother      Deep Vein  "Thrombosis (DVT) Daughter      Depression Daughter      Alcohol/Drug Other         self     Diabetes Other         self     Thyroid Disease Other         self     Asthma Other         self     Macular Degeneration No family hx of      Anesthesia Reaction No family hx of                Physical Exam:   /74   Pulse 98   Resp 18   Ht 1.676 m (5' 6\")   SpO2 97%   BMI 33.39 kg/m      General: adult female, using a walker, appears stated age  HENT: external ears without visible abnormalities, no rhinorrhea, no epistaxis, wearing a mask  Lungs: diffuse rhonchi, wheezing in RUL and LLL, on NC 3L during appointment, no accessory muscle use  Heart: RRR, no murmurs  Extremities: trace bilateral pitting edema, bilateral clubbing, no cyanosis  Skin: no visible rashes, no mottling, scattered ecchymoses in various stages of healing  Neurologic: moving all 4 extremities spontaneously, awake and alert  Psych: full affect, appropriate insight, appropriate judgment          Data:   Labs: notable labs in HPI above.    CO2 (4/17/23): 36 -> (11/27/23) 31  Alpha-1 Antitrypsin (2/22/2023): 149, normal M1M1 phenotype    Imaging and other diagnostic testing (all imaging studies reviewed by me)    CT chest 12/12/2023: RUL masslike consolidation appears larger and more dense compared to prior; diffuse emphysema stable though severe  CT chest 3/22/2023: RUL nodules stable compared to 3/1/2023; ROBERT mucus plugging    PFTs 12/2020:        PFTs 4/2023: FEV1 0.55 (-4.42), FVC 1.36 (-3.64), 0.40 -> severe obstruction, stable compared to 12/2020    6MWT 8/10/2022: 540 feet, 97% -> 90% on room air (previous walk in 12/2020 for 480 feet)    Transthoracic echocardiogram (9/2022): EF 55-60%, normal RV, cannot assess PASP, normal RA pressure      Again, thank you for allowing me to participate in the care of your patient.        Sincerely,        Kailee Moralez MD  "

## 2024-03-20 NOTE — CONFIDENTIAL NOTE
Physician Attestation   I, Cleopatra Pearce MD, saw this patient and agree with the findings and plan of care as documented in the note.      Items personally reviewed/procedural attestation: vitals and spirometry report and agree with the interpretation documented in the note.  67 yo with very severe COPD recently treated for potential pneumonia and has been on steroids for 14 days without significant improvement. Obtaining 2 vwCXR, cultures, and antibiotics with plan to taper the steroids slowly.     Cleopatra Pearce MD

## 2024-03-22 LAB
BACTERIA SPT CULT: ABNORMAL
GRAM STAIN RESULT: ABNORMAL

## 2024-03-29 ENCOUNTER — TELEPHONE (OUTPATIENT)
Dept: INTERNAL MEDICINE | Facility: CLINIC | Age: 69
End: 2024-03-29
Payer: COMMERCIAL

## 2024-03-29 NOTE — TELEPHONE ENCOUNTER
DESMOND PA REQUEST    Please route outcome to DESMOND/RPH: Samy Prasad    Prior Authorization Retail Medication Request    Medication/Dose: Mounjaro 2.5 mg weekly  ICD code (if different than what is on RX):    Previously Tried and Failed:  Trulicity (side effects), metformin (side effects) on Jardiance  Rationale:  Needs GLP1 to bring down  blood glucose. Last A1c 8.1%    Insurance Name:     Insurance ID:         Pharmacy Information (if different than what is on RX)  Name:     Phone:

## 2024-04-05 ENCOUNTER — VIRTUAL VISIT (OUTPATIENT)
Dept: PHARMACY | Facility: CLINIC | Age: 69
End: 2024-04-05
Payer: COMMERCIAL

## 2024-04-05 ENCOUNTER — OFFICE VISIT (OUTPATIENT)
Dept: OPHTHALMOLOGY | Facility: CLINIC | Age: 69
End: 2024-04-05
Attending: STUDENT IN AN ORGANIZED HEALTH CARE EDUCATION/TRAINING PROGRAM
Payer: COMMERCIAL

## 2024-04-05 DIAGNOSIS — E11.42 DIABETIC POLYNEUROPATHY ASSOCIATED WITH TYPE 2 DIABETES MELLITUS (H): ICD-10-CM

## 2024-04-05 DIAGNOSIS — H40.033 ANATOMICAL NARROW ANGLE OF BOTH EYES: Primary | ICD-10-CM

## 2024-04-05 DIAGNOSIS — Z96.1 PSEUDOPHAKIA, BOTH EYES: ICD-10-CM

## 2024-04-05 DIAGNOSIS — E11.9 TYPE 2 DIABETES MELLITUS WITHOUT COMPLICATION, WITHOUT LONG-TERM CURRENT USE OF INSULIN (H): Primary | ICD-10-CM

## 2024-04-05 DIAGNOSIS — I10 HYPERTENSION, UNSPECIFIED TYPE: ICD-10-CM

## 2024-04-05 PROCEDURE — 99606 MTMS BY PHARM EST 15 MIN: CPT | Mod: 93 | Performed by: PHARMACIST

## 2024-04-05 PROCEDURE — G0463 HOSPITAL OUTPT CLINIC VISIT: HCPCS | Performed by: STUDENT IN AN ORGANIZED HEALTH CARE EDUCATION/TRAINING PROGRAM

## 2024-04-05 PROCEDURE — 99213 OFFICE O/P EST LOW 20 MIN: CPT | Performed by: STUDENT IN AN ORGANIZED HEALTH CARE EDUCATION/TRAINING PROGRAM

## 2024-04-05 PROCEDURE — 99607 MTMS BY PHARM ADDL 15 MIN: CPT | Mod: 93 | Performed by: PHARMACIST

## 2024-04-05 ASSESSMENT — REFRACTION_WEARINGRX
OS_CYLINDER: +1.25
OD_AXIS: 017
OD_CYLINDER: +1.25
OS_AXIS: 160
OS_AXIS: 160
OS_SPHERE: PLANO
OS_SPHERE: +2.50
OD_CYLINDER: +1.25
OD_SPHERE: +2.00
OD_AXIS: 017
SPECS_TYPE: SVL/BF/PAL
OD_ADD: +2.50
OS_CYLINDER: +1.25
OS_ADD: +2.50
OD_SPHERE: -0.50

## 2024-04-05 ASSESSMENT — VISUAL ACUITY
OD_CC: 20/20
METHOD: SNELLEN - LINEAR
OS_CC: 20/25
CORRECTION_TYPE: GLASSES

## 2024-04-05 ASSESSMENT — SLIT LAMP EXAM - LIDS
COMMENTS: WNL
COMMENTS: WNL

## 2024-04-05 ASSESSMENT — TONOMETRY
OD_IOP_MMHG: 11
OS_IOP_MMHG: 18
IOP_METHOD: TONOPEN

## 2024-04-05 ASSESSMENT — CONF VISUAL FIELD
OS_NORMAL: 1
OS_SUPERIOR_NASAL_RESTRICTION: 0
OD_SUPERIOR_NASAL_RESTRICTION: 0
OD_INFERIOR_NASAL_RESTRICTION: 0
OS_INFERIOR_TEMPORAL_RESTRICTION: 0
OS_INFERIOR_NASAL_RESTRICTION: 0
OD_NORMAL: 1
OS_SUPERIOR_TEMPORAL_RESTRICTION: 0
OD_INFERIOR_TEMPORAL_RESTRICTION: 0
OD_SUPERIOR_TEMPORAL_RESTRICTION: 0

## 2024-04-05 ASSESSMENT — EXTERNAL EXAM - LEFT EYE: OS_EXAM: NORMAL

## 2024-04-05 ASSESSMENT — EXTERNAL EXAM - RIGHT EYE: OD_EXAM: NORMAL

## 2024-04-05 NOTE — Clinical Note
Hello,  She is still having a lot of neuropathy and I was wondering your thoughts on increasing her Lyrica to three times a day?  Trying to get her Mounjaro but PA team is behind. May have her start insulin, but she did not have blood glucose readings for me today.  Also if blood pressure is still elevated at your visit Id recommend an increase in losartan to 100 mg.  Please let me know what questions you have for me,  Samy Prasad, Pharm. D., BCACP Medication Therapy Management Pharmacist

## 2024-04-05 NOTE — PATIENT INSTRUCTIONS
"Recommendations from today's MTM visit:                                                    MTM (medication therapy management) is a service provided by a clinical pharmacist designed to help you get the most of out of your medicines.   Today we reviewed what your medicines are for, how to know if they are working, that your medicines are safe and how to make your medicine regimen as easy as possible.    If your blood pressure is high at visit with Mitzi Nettles, APRN CNP  Could consider increasing Lyrica to 450 mg daily  Send me your blood glucose over mychart and then we will decide on insulin    Follow-up: Return in about 5 weeks (around 5/10/2024) for Follow up, with me.    It was great speaking with you today.  I value your experience and would be very thankful for your time in providing feedback in our clinic survey. In the next few days, you may receive an email or text message from [employer] with a link to a survey related to your  clinical pharmacist.\"     To schedule another MTM appointment, please call the clinic directly or you may call the MTM scheduling line at [phone] or toll-free at [phone].     My Clinical Pharmacist's contact information:                                                      Please feel free to contact me with any questions or concerns you have.      Samy Prasad, Pharm. D., [facility]  Medication Therapy Management Pharmacist    "

## 2024-04-05 NOTE — PROGRESS NOTES
HPI       Follow Up    In both eyes.  Associated symptoms include dryness.  Negative for eye pain, pain with eye movement, flashes and floaters.  Treatments tried include artificial tears. Additional comments: 6 month follow up   Vision is good  Art tears qid BE  Gel drops bid BE  Kellen Wall COA 9:02 AM April 5, 2024             Last edited by Kellen Wall on 4/5/2024  9:02 AM.          Review of systems for the eyes was negative other than the pertinent positives/negatives listed in the HPI.    Ocular Meds: ATs QID OU; AT gel BID OU    Ocular Hx: refractive error OU; history of anatomically narrow angles OU; CE/IOL left eye 8/24/23; CE/IOL right eye 9/5/23    FOHx: no family history of glaucoma or blindness    PMHx:     Past Medical History:   Diagnosis Date    Adenocarcinoma, lung (H)     Asthma     Ectopic pregnancy     Esophageal reflux     Pulmonary emphysema (H)     Very severe FEV1<30% predicted    Type II diabetes mellitus (H)        Assessment & Plan     Jenn Aparicio is a 68 year old female with the following diagnoses:    Pseudophakia both eyes  - doing well  - observe    Hx of Anatomically Narrow Angles OU  - IOP wnl, CVF full to CF OU  - no FHx of glaucoma  - previously on gonio undilated with 4-mirror had steep approach of iris with angles open to PTM/SS most areas, and opens on dynamic gonioscopy OU  - post cataract surgery (4/5/24) gonioscopy open to at least scleral spur 360 OU ?small PAS superior angle vs iris process left eye, stable to prior    T2DM without Retinopathy OU  - strict BP/BG control  - patient understands importance of good blood glucose control in order to reduce the risk of developing diabetic retinopathy  - yearly DFEl due for dilated exam next fisit    Dry Eyes OU  - PFATs QID and prn OU  - can use artificial tear gel or ointment at bedtime OU  - consider punctal plugs in future (reviewed with patient)    Myopia of right eye with astigmatism and presbyopia  Astigmatism of left  eye with presbyopia  - good vision with current MRx  - observe    Counseled return/RD precautions    Patient disposition:   Return in about 6 months (around 10/5/2024) for Annual Visit, or sooner changes.      Attending Physician Attestation:  Complete documentation of historical and exam elements from today's encounter can be found in the full encounter summary report (not reduplicated in this progress note).  I personally obtained the chief complaint(s) and history of present illness.  I confirmed and edited as necessary the review of systems, past medical/surgical history, family history, social history, and examination findings as documented by others; and I examined the patient myself.  I personally reviewed the relevant tests, images, and reports as documented above.  I formulated and edited as necessary the assessment and plan and discussed the findings and management plan with the patient and family. . - Re Keita MD

## 2024-04-05 NOTE — PROGRESS NOTES
Medication Therapy Management (MTM) Encounter    ASSESSMENT:                            Medication Adherence/Access: No issues identified    Diabetes:   Patient is not meeting A1c goal of < 7%. Difficult to assess as she does not have blood sugar readings to give me. Would be ideal to start Mounjaro, but the PA team is behind. Could consider Lantus in the mean time, however she has been at goal previously and the prednisone has made the picture cloudy in terms of the A1c. Will have her send me some blood sugars via Qoostart to determine if it would be safe to start insulin at this time.     Hypertension:   Last blood pressure above goal of <130/80 mmhg, but this was only one reading. If elevated at next visit should consider an increased dose of losartan.     Neuropathy:  Could consider an increase in the dose of Lyrica. If blood glucose is uncontrolled then better control of this would also help the neuropathy.     PLAN:                            If your blood pressure is high at visit with BENOIT Ruiz CNP could increase losartan  Could consider increasing Lyrica to 450 mg daily  Send me your blood glucose over Qoostart and then we will decide on insulin    Follow-up: Return in about 5 weeks (around 5/10/2024) for Follow up, with me.    SUBJECTIVE/OBJECTIVE:                          Jenn Aparicio is a 68 year old female called for a follow-up visit.       Reason for visit: MTM follow-up.    Allergies/ADRs: Reviewed in chart  Past Medical History: Reviewed in chart  Tobacco: She reports that she quit smoking about 15 months ago. Her smoking use included cigarettes. She smoked an average of 0.3 packs per day. She has been exposed to tobacco smoke. She has never used smokeless tobacco.  Alcohol: none    Medication Adherence/Access: no issues reported    Diabetes   Jardiance 25 mg daily  Mounjaro needs a PA - wants to wait to make any changes until this PA is decided  Metformin, developed diarrhea, has tried  both extended release and short acting formulations      Blood sugar monitoring: CGM. Ranges (patient reported):   Reports she doesn't have her meter with her at the time.     Eye exam: due  Foot exam: due    Statin: atorvastatin    Just went off prednisone.       Eye exam is up to date  Foot exam is up to date  Urine Albumin:   Lab Results   Component Value Date    UMALCR 271.48 (H) 08/23/2023      Lab Results   Component Value Date    A1C 8.1 (H) 03/07/2024       Hypertension   Losartan 50 mg daily    Reports she tried losartan and was having a lot of side effects of head spinning so she stopped it. She did not stop the amlodipine as instructed when she started the losartan.      Patient reports that her blood pressure dropped to 89 mmHg. Hasn't checked since.    Patient reports the following medication side effects:swelling in ankles.   [facility] indicate cough with lisinopril              BP Readings from Last 3 Encounters:   03/20/24 137/74   01/18/24 117/73   01/18/24 117/73     Pulse Readings from Last 3 Encounters:   03/20/24 98   01/18/24 90   01/18/24 90     Neuropathy:  Alpha lipoic acid 600 mg daily  Capsaicin cream 0.025% as needed not working  Duloxetine 30 mg twice a day   Lyrica 150 mg twice a day   Reports that her feet are really sensative and painful . Reports it causes her to have poor balance and at risk of falling     Today's Vitals: There were no vitals taken for this visit.  ----------------      I spent 10 minutes with this patient today. All changes were made via collaborative practice agreement with BENOIT Ruiz CNP. A copy of the visit note was provided to the patient's provider(s).    A summary of these recommendations was sent via [email or web address].    Samy Prasad, Pharm. D., [facility]  Medication Therapy Management Pharmacist      Telemedicine Visit Details  Type of service:  Telephone visit  Start Time:  830 am  End Time:  840 am     Medication Therapy  Recommendations  Diabetic neuropathy (H)    Current Medication: pregabalin (LYRICA) 150 MG capsule   Rationale: Dose too low - Dosage too low - Effectiveness   Recommendation: Increase Dose   Status: Contact Provider - Awaiting Response

## 2024-04-05 NOTE — NURSING NOTE
Chief Complaints and History of Present Illnesses   Patient presents with    Follow Up     6 month follow up   Vision is good  Art tears qid BE  Gel drops bid BE  Kellen Wall [facility] 9:02 AM April 5, 2024        Chief Complaint(s) and History of Present Illness(es)       Follow Up              Laterality: both eyes    Associated symptoms: dryness.  Negative for eye pain, pain with eye movement, flashes and floaters    Treatments tried: artificial tears    Comments: 6 month follow up   Vision is good  Art tears qid BE  Gel drops bid BE  Kellen Raines [facility] 9:02 AM April 5, 2024

## 2024-04-09 ENCOUNTER — OFFICE VISIT (OUTPATIENT)
Dept: INTERNAL MEDICINE | Facility: CLINIC | Age: 69
End: 2024-04-09
Payer: COMMERCIAL

## 2024-04-09 VITALS
HEIGHT: 66 IN | WEIGHT: 202.4 LBS | BODY MASS INDEX: 32.53 KG/M2 | HEART RATE: 104 BPM | SYSTOLIC BLOOD PRESSURE: 136 MMHG | OXYGEN SATURATION: 86 % | DIASTOLIC BLOOD PRESSURE: 79 MMHG

## 2024-04-09 DIAGNOSIS — M54.50 ACUTE RIGHT-SIDED LOW BACK PAIN WITHOUT SCIATICA: ICD-10-CM

## 2024-04-09 DIAGNOSIS — J30.2 SEASONAL ALLERGIC RHINITIS, UNSPECIFIED TRIGGER: ICD-10-CM

## 2024-04-09 DIAGNOSIS — E11.42 DIABETIC POLYNEUROPATHY ASSOCIATED WITH TYPE 2 DIABETES MELLITUS (H): Primary | ICD-10-CM

## 2024-04-09 PROCEDURE — 99214 OFFICE O/P EST MOD 30 MIN: CPT | Performed by: NURSE PRACTITIONER

## 2024-04-09 RX ORDER — CYCLOBENZAPRINE HCL 5 MG
5 TABLET ORAL 3 TIMES DAILY PRN
Qty: 21 TABLET | Refills: 0 | Status: SHIPPED | OUTPATIENT
Start: 2024-04-09 | End: 2024-06-26

## 2024-04-09 RX ORDER — PERPHENAZINE 16 MG
600 TABLET ORAL DAILY
Qty: 90 CAPSULE | Refills: 3 | Status: SHIPPED | OUTPATIENT
Start: 2024-04-09

## 2024-04-09 RX ORDER — CETIRIZINE HYDROCHLORIDE 10 MG/1
10 TABLET ORAL EVERY MORNING
Qty: 90 TABLET | Refills: 3 | Status: SHIPPED | OUTPATIENT
Start: 2024-04-09 | End: 2024-07-09

## 2024-04-09 RX ORDER — BLOOD SUGAR DIAGNOSTIC
STRIP MISCELLANEOUS
COMMUNITY
Start: 2022-10-28 | End: 2024-09-27

## 2024-04-09 RX ORDER — POLYETHYLENE GLYCOL 400 AND PROPYLENE GLYCOL 4; 3 MG/ML; MG/ML
SOLUTION/ DROPS OPHTHALMIC
COMMUNITY
Start: 2022-10-18 | End: 2024-05-03

## 2024-04-09 RX ORDER — RESPIRATORY SYNCYTIAL VIRUS VACCINE 120MCG/0.5
0.5 KIT INTRAMUSCULAR ONCE
Qty: 1 EACH | Refills: 0 | Status: CANCELLED | OUTPATIENT
Start: 2024-04-09 | End: 2024-04-09

## 2024-04-09 RX ORDER — GLIPIZIDE 5 MG/1
5 TABLET ORAL
COMMUNITY
Start: 2022-06-01 | End: 2024-05-10

## 2024-04-09 NOTE — PROGRESS NOTES
"  Assessment & Plan     Diabetic neuropathy (H)  - alpha-lipoic acid 600 MG capsule; Take 1 capsule (600 mg) by mouth daily    Acute right-sided low back pain without sciatica  - cyclobenzaprine (FLEXERIL) 5 MG tablet; Take 1 tablet (5 mg) by mouth 3 times daily as needed for muscle spasms    Seasonal allergic rhinitis, unspecified trigger  - cetirizine (ZYRTEC) 10 MG tablet; Take 1 tablet (10 mg) by mouth every morning  Obtain RSV vaccine in pharmacy.  Schedule a 3 month follow up call [phone].       I spent a total of 35 minutes in the care of this pt during today's office visit. This time includes reviewing the patient's chart and prior history, obtaining a history, performing an examination and evaluation and counseling the patient. This time also includes ordering medications or tests necessary in addition to communication to other member's of the patient's health care team. Time spent in documentation and care coordination is included.         Kirby Barajas is a 68 year old, presenting for the following health issues:  Follow Up (Back pain, getting diabetes medicine, use alpha lipoic acid for neuropathy but hasn't been working as well, want pill for allergies)      4/9/2024     8:31 AM   Additional Questions   Roomed by [facility]      Jenn was on a step ladder cleaning windows when she slipped and \"almost fell out the window but caught herself. A day or to later she noted acute right low back pain which she attributes to the near fall.The pain is primarily in the right and mid low back , a little in the left low back.slight radiation into right upper buttock. Using Nate Ortiz ointment, external heat and lidocaine patches. She also has neuropathy and is using alpha lipoic acid which she thinks is helpful in reducing the intensity. She is attempting to keep moving, has using Nate Ortiz and Lidocaine patches.    Requesting rx for Zyrtec.    Patient Active Problem List   Diagnosis     Vaginitis     " "Postmenopausal bleeding     Cervical stenosis (uterine cervix)     Pulmonary emphysema (H)     Type 2 diabetes mellitus without complication, without long-term current use of insulin (H)     Primary lung adenocarcinoma (H) STAGE: IA2 [id number] poorly diff adeno RLL, then [id number] IA2 suspected NSCLC RUL, medically inoperable      Chronic obstructive pulmonary disease, unspecified COPD type (H)     Malnutrition (H24)     Diabetic neuropathy (H)     Hypoxia     Shortness of breath     Chest pain, unspecified type     Hypokalemia     Gastroesophageal reflux disease without esophagitis     Esophageal dysphagia     Acute and chronic respiratory failure with hypoxia (H)     Combined forms of age-related cataract of both eyes     Class 2 severe obesity due to excess calories with serious comorbidity in adult (H)       Objective    /79 (BP Location: Right arm, Patient Position: Sitting, Cuff Size: Adult Large)   Pulse 104   Ht 1.676 m (5' 6\")   Wt 91.8 kg (202 lb 6.4 oz)   SpO2 (!) 86%   BMI 32.67 kg/m    Body mass index is 32.67 kg/m .  Physical Exam   GENERAL: alert and no distress  RESP: no respiratory distress.  CV:see VS.  MS: no gross musculoskeletal defects noted, no edema.  Pain with palpation of the right  Para spinal L3 region. No spine tenderness on direct palpation.   NEURO: Normal strength and tone, mentation intact and speech normal  PSYCH: mentation appears normal, affect normal/bright      Signed Electronically by: BENOIT Ruiz CNP    Answers for HPI/ROS submitted by the patient on 4/9/2024  Frequency of checking blood sugars:: one time daily  What time of day are you checking your blood sugars : before and after meals  Have you had any blood sugars above 200?: Yes  Have you had any blood sugars below 70?: Yes  Hypoglycemia symptoms:: shakiness  Diabetic concerns:: none  Paraesthesia present:: numbness in feet  Current status of COPD symptom:: no change  Status of fatigue and dyspnea " with ambulation:: same as usual  Status of dyspnea:: same as usual  Increase or change in cough or sputum:: rarely  Have you noticed a change in your sputum/phlegm?: Yes  Have you experienced a recent fever?: No  Baseline ambulation without stopping to rest:: the length of 3-5 rooms  Number of flights of stairs without resting:: None  Have you had any Emergency Room, Urgent Care visits or a Hospital admission because of your COPD since your last office visit?: Yes  How many times have you gone to the ER, Urgent Care, or been hospitalized for COPD since your last clinic visit?: 1  Your back pain is: new  What do you think is the original cause of your back pain?: a near-fall  When did you first notice your back pain? : in the last week  How would you describe your back pain? : sharp, shooting, stabbing  How often do you feel your back pain? : constantly  Where is your back pain located? : right lower back, left lower back  Where does your back pain spread? : left thigh  Since you noticed your back pain, how has it changed? : gradually worsening  Does your back pain interfere with your job?: Not applicable  On a scale of 1-10 (10 being the worst), how strong is your back pain?: 10  What makes your back pain worse? : bending, coughing, certain positions, twisting  Acupuncture:: not tried  Acetaminophen: not tried  Activity or Exercise: not tried  Chiropractor: not tried  Cold: not tried  Heat: helpful  Massage: not tried  Muscle relaxants : not tried  NSAIDS (Ibuprofen, Naproxen) : not tried  Opioids: not tried  Physical Therapy: not tried  Rest: helpful  Steroid Injection: not tried  Stretching : not tried  Surgery: not tried  TENS Unit: not tried  Topical pain relievers : not tried  Do you see any other healthcare providers for your back pain? : None  How many servings of fruits and vegetables do you eat daily?: 0-1  On average, how many sweetened beverages do you drink each day (Examples: soda, juice, sweet tea,  etc.  Do NOT count diet or artificially sweetened beverages)?: 3  How many minutes a day do you exercise enough to make your heart beat faster?: 30 to 60  How many days a week do you exercise enough to make your heart beat faster?: 3 or less  How many days per week do you miss taking your medication?: 0

## 2024-04-10 ENCOUNTER — TELEPHONE (OUTPATIENT)
Dept: INTERNAL MEDICINE | Facility: CLINIC | Age: 69
End: 2024-04-10
Payer: COMMERCIAL

## 2024-04-10 ENCOUNTER — DOCUMENTATION ONLY (OUTPATIENT)
Dept: PULMONOLOGY | Facility: CLINIC | Age: 69
End: 2024-04-10
Payer: COMMERCIAL

## 2024-04-10 DIAGNOSIS — J44.1 OBSTRUCTIVE CHRONIC BRONCHITIS WITH EXACERBATION (H): Primary | ICD-10-CM

## 2024-04-10 NOTE — TELEPHONE ENCOUNTER
Patient confirmed scheduled appointment:  Date: 7/9  Time: 8:30a  Visit type: rtn  Provider: pcp  Location: [facility] [place]

## 2024-04-11 NOTE — TELEPHONE ENCOUNTER
Retail Pharmacy Prior Authorization Team   Phone: [phone]    PA Initiation    Medication: MOUNJARO 2.5 MG/0.5ML SC SOPN  Insurance Company: [employer] - Phone [phone] Fax [phone]  Pharmacy Filling the Rx: [id number] - [place], [place] - [address]  Filling Pharmacy Phone: [phone]  Filling Pharmacy Fax:    Start Date: 4/11/2024    There is already an approval on file for the medication.  Called pharmacy, they now got a paid claim and the patient will get an email with delivery info and the cost.

## 2024-04-29 ENCOUNTER — OFFICE VISIT (OUTPATIENT)
Dept: GASTROENTEROLOGY | Facility: CLINIC | Age: 69
End: 2024-04-29
Attending: PHYSICIAN ASSISTANT
Payer: COMMERCIAL

## 2024-04-29 VITALS
BODY MASS INDEX: 33.19 KG/M2 | SYSTOLIC BLOOD PRESSURE: 117 MMHG | WEIGHT: 206.5 LBS | DIASTOLIC BLOOD PRESSURE: 75 MMHG | HEIGHT: 66 IN | HEART RATE: 98 BPM | OXYGEN SATURATION: 95 %

## 2024-04-29 DIAGNOSIS — K21.9 GASTROESOPHAGEAL REFLUX DISEASE, UNSPECIFIED WHETHER ESOPHAGITIS PRESENT: ICD-10-CM

## 2024-04-29 DIAGNOSIS — K44.9 HIATAL HERNIA: ICD-10-CM

## 2024-04-29 PROCEDURE — 99214 OFFICE O/P EST MOD 30 MIN: CPT | Performed by: PHYSICIAN ASSISTANT

## 2024-04-29 NOTE — NURSING NOTE
"Chief Complaint   Patient presents with    Follow Up       Vitals:    04/29/24 0821   BP: 117/75   Pulse: 98   SpO2: 95%   Weight: 93.7 kg (206 lb 8 oz)   Height: 1.676 m (5' 6\")       Body mass index is 33.33 kg/m .    [employer]    "

## 2024-04-29 NOTE — PATIENT INSTRUCTIONS
It was a pleasure taking care of you today.  I've included a brief summary of our discussion and care plan from today's visit below.  Please review this information with your primary care provider.  _______________________________________________________________________    My recommendations are summarized as follows:    - Reflux lifestyle modifications as directed within the AVS  - Obtain a wedge pillow and sleep with head end of the best elevated   - Do not eat or drink for at least 4 hours prior to laying down for bed  - Message to providers to clarify medications/acid suppressive medication   - Formal videos swallow study. To schedule imaging, please call [phone]   - Follow up with your dentist regarding poor dietician and to consider dentures.   - If unrevealing and swallowing concerns increase would then consider upper endoscopy with repeat dilation   - Colonoscopy for colon cancer screening scheduled for 9/5/2024      To schedule endoscopic procedures you may call: [phone]  To schedule radiology (imaging) tests you may call: [phone]  To schedule an ENT appointment you may call: [phone]    Please call my nurse Nicole ([phone])Gustavo ([phone]) with any questions or concerns.      Return to GI Clinic in 4 months to review your progress.    _______________________________________________________________________    Who do I call with any questions after my visit?  Please be in touch if there are any further questions that arise following today's visit.  There are multiple ways to contact your gastroenterology care team.      During business hours, you may reach a Gastroenterology nurse at [phone] and choose option 3.       To schedule or reschedule an appointment, please call [phone].     You can always send a secure message through [employer].  [employer] messages are answered by your nurse or doctor typically within 24 hours.  Please allow extra time on weekends and holidays.       For urgent/emergent questions after business hours, you may reach the on-call GI Fellow by contacting the [facility]  at [phone].     How will I get the results of any tests ordered?    You will receive all of your results.  If you have signed up for [email or web address], any tests ordered at your visit will be available to you after your physician reviews them.  Typically this takes 1-2 weeks.  If there are urgent results that require a change in your care plan, your physician or nurse will call you to discuss the next steps.      What is [employer]?  [employer] is a secure way for you to access all of your healthcare records from the [facility].  It is a web based computer program, so you can sign on to it from any location.  It also allows you to send secure messages to your care team.  I recommend signing up for [employer] access if you have not already done so and are comfortable with using a computer.      How to I schedule a follow-up visit?  If you did not schedule a follow-up visit today, please call [phone] to schedule a follow-up office visit.      If you feel you received exceptional care and are interested in supporting the clinical and research goals of Gabriela Damon PA-C or the Division of Gastroenterology, Hepatology, and Nutrition please contact [email or web address] from the [facility] to discuss opportunities to donate.    Sincerely,    Gabriela Damon PA-C  Division of Gastroenterology, Hepatology, and Nutrition  [facility]

## 2024-04-29 NOTE — PROGRESS NOTES
Gastroenterology Visit for: Jenn Aparicio 1955   MRN: [id number]     Reason for Visit:  chief complaint    Referred by: Self  / No address on file  Patient Care Team:  Mitzi Nettles APRN CNP as PCP - General (Nurse Practitioner)  Zarina Leung MD as MD (Internal Medicine-Hematology & Oncology)  Latesha Christianson, RN as Clinic Care Coordinator  Zarina Leung MD as Assigned Cancer Care Provider  Mitzi Nettles APRN CNP as Assigned PCP  Lynda Spann MD as Fellow (Pulmonary Disease)  La Nena Shabazz MD as MD (Endocrinology, Diabetes, and Metabolism)  Nithya Narvaez RN as Diabetes Educator (Diabetes Education)  Nithya Narvaez RN as Diabetes Educator (Diabetes Education)  La Nena Shabazz MD as Assigned Endocrinology Provider  Kush Santamaria MD as MD (Internal Medicine)  Kailee Moralze MD as Resident (Student in organized health care education/training program)  Gab Walden MD as Hospitalist (Internal Medicine)  Re Keita MD as MD (Ophthalmology)  Samy Prasad [facility] as Assigned MTM Pharmacist  Earl Ureña MD as Assigned Pulmonology Provider  Re Keita MD as Assigned Surgical Provider  Ana Davey, RN as Specialty Care Coordinator (Hematology & Oncology)  Gabriela Damon PA-C as Assigned Gastroenterology Provider    History of Present Illness:   Jenn Aparicio is a 68 year old female with significant past medical history pertinent for serve COPD on home O2 (3-4L), RLL adenocarcinoma of the lung s/p lobectomy (2/2016), RUL NSCLC s/p SBRT (1/2020) acute on chronic hypoxic respiratory failure, DM type II complicated by diabetic neuropathy, who is presenting as a follow up patient with a chief complaint of reflux and  "dysphagia.    -----------------------------------------------------------------------------------------------------------------------------------------------------------------------------------------------------------------------------------  Interval History April 29, 2024:      It is unknown which acid suppressive medication Jenn is taking. She believes that she may be taking Omeprazole 20 mg possibly twice daily and with this regimen symptoms of reflux are well controlled. If eating late at night will experience rare breakthrough symptoms. Notes she has to eat earlier in the day.      Notes that she has poor dietician. Total of \"6 little pegs in the front.\"  No change in dysphagia symptoms since dilation 8/8/2023. Symptoms are occurring to solids occurring 1-2 times per month. Will cut pills in 1/2 if they are large. Denies dysphagia to semi solids and liquids.      Started Hira last week.      Denies unintentional weight loss, nausea, emesis, odynophagia, diarrhea, constipation, nocturnal stooling, incontinence, melena, hematochezia and BRBR.   -----------------------------------------------------------------------------------------------------------------------------------------------------------------------------------------------------------------------------------  Interval History December 18, 2023:    Seen by oncology 12/13/2023 with concerns for reoccurrence of disease with plan for repeat PET/possible targeted biopsies.      Since the increase in Ozempic has had increase in regurgitation and dysgeusia despite use of Omeprazole 40 mg BID.  Prior to escalation reflux/regurgitation was well controlled.      Additionally she has had an increase in gas/distention and generalized abdominal cramping also with the increase of the Ozempic. The symptoms have no association with defecation/oral intake.      Associated with gait imbalance, feeling of instability, leg cramping and fatigue.      Denies weight " loss, nausea, emesis, dysphagia, odynophagia, bloating/fullness, post prandial bloating, diarrhea, constipation, nocturnal stooling, incontinence, melena, hematochezia and BRBR.     -----------------------------------------------------------------------------------------------------------------------------------------------------------------------------------------------------------------------------------    Interval History September 18, 2023:    CBC CMP 5/2023 WNL.     Since the endoscopy 8/8/2023 she has had no problems with dysphagia.     Currently she is experiencing heartburn/regurgitation daily after each meal. Currently she is taking Omeprazole 20mg twice daily 30 - 60 minutes prior to meals.  Noting lemon, chocolate are a dietary trigger for reflux.     Symptoms of reflux have worsened since initiation of Ozempic.     Denies weight loss, nausea, emesis, dysphagia, odynophagia, abdominal pain, diarrhea, constipation (< 3 stools per week), nocturnal stooling, incontinence of feces, melena, hematochezia and BRBR.     Brother possibly had pancreatic cancer? Patient is not certain to the details    No additional family history or GI related malignancy (esophageal, gastric, pancreatic, liver or colon).     ------------------------------------------------------------------------------------------------------------------------------------------------------------------------------------------------------------------------------------------    7/2023 Dr. BRITTANY Briseno [facility]    History of Present Illness:   This is a patient evidence following for the last few years with severe acid reflux associate with emphysema also dysphagia which has been difficult to pin down she had initially an esophagram which are demonstrated some tertiary contractions and EGD follow-up of this which did not demonstrate any abnormalities with exception of a hiatal hernia.  The patient went on to have an esophageal manometry recently which is read as  normal.  The patient's continue to have difficulty swallowing sometimes regurgitating food back up is not sure from where when I talk about her chest or areas that she feels sensations it is difficult for her to answer that.  She sometimes feels like she has a sore throat and cannot swallow.    Esophageal Questionnaire(s)    BEDQ Questionnaire      6/28/2023     2:12 PM 12/18/2023     7:59 AM 4/29/2024     8:19 AM   BEDQ Questionnaire: How Often Have You Had the Following?   Trouble eating solid food (meat, bread, vegetables) 4 4 2   Trouble eating soft foods (yogurt, jello, pudding) 0     Trouble swallowing liquids 4     Pain while swallowing 0     Coughing or choking while swallowing foods or liquids 2     Total Score: 10           6/28/2023     2:12 PM 4/29/2024     8:19 AM   BEDQ Questionnaire: Discomfort/Pain Ratings   Eating solid food (meat, bread, vegetables) 0 3   Eating soft foods (yogurt, jello, pudding) 0    Drinking liquid 0    Total Score: 0        Eckardt Questionnaire      6/28/2023     2:12 PM 12/18/2023     7:59 AM   Eckardt Questionnaire   Dysphagia 2 2   Regurgitation 3 2   Retrosternal Pain 0 0   Weight Loss (kg) 0 0   Total Score:  5 4       Promis 10 Questionnaire      12/18/2023     8:01 AM 4/29/2024     8:27 AM   PROMIS 10 FLOWSHEET DATA   In general, would you say your health is: 2 2   In general, would you say your quality of life is: 5 3   In general, how would you rate your physical health? 1 2   In general, how would you rate your mental health, including your mood and your ability to think? 5 4   In general, how would you rate your satisfaction with your social activities and relationships? 3 3   In general, please rate how well you carry out your usual social activities and roles. (This includes activities at home, at work and in your community, and responsibilities as a parent, child, spouse, employee, friend, etc.) 3 2   To what extent are you able to carry out your everyday  physical activities such as walking, climbing stairs, carrying groceries, or moving a chair? 2 2   In the past 7 days, how often have you been bothered by emotional problems such as feeling anxious, depressed, or irritable? 2 2   In the past 7 days, how would you rate your fatigue on average? 3 4   In the past 7 days, how would you rate your pain on average, where 0 means no pain, and 10 means worst imaginable pain? 9 9   Mental health question re-calculation - no clinical value 4 4   Physical health question re-calculation - no clinical value 3 2   Pain question re-calculation - no clinical value 2 2   Global Mental Health Score 17 14   Global Physical Health Score 8 8   PROMIS TOTAL - SUBSCORES 25 22       STUDIES & PROCEDURES:    EGD:     8/8/2023 Dr. Rodrigues  Findings:       There was pooling of thin secretions in the posterior        oropharynx/hypopharynx and in the vallecula. This was suctioned by the        anesthesia team repeatedly throughout the procedure        The examined esophagus was normal.        No endoscopic abnormality was evident in the esophagus to explain the        patient's complaint of dysphagia. It was decided, however, to proceed        with dilation at the cricopharyngeus. A TTS dilator was passed through        the scope. Dilation with 11-12-13.5-15-16mm dilator was performed. The        dilation site was examined and showed no change. Estimated blood loss:        none.        The Z-line was regular and was found 38 cm from the incisors.        A 2 cm hiatal hernia was found. The proximal extent of the gastric folds        (end of tubular esophagus) was 38 cm from the incisors. The hiatal        narrowing was 40 cm from the incisors.        The gastroesophageal flap valve was visualized endoscopically and        classified as Hill Grade III (minimal fold, loose to endoscope, hiatal        hernia likely).        Patchy moderately erythematous mucosa without bleeding was found in the         gastric body and in the gastric antrum. Biopsies were taken with a cold        forceps for Helicobacter pylori testing. Verification of patient        identification for the specimen was done. Estimated blood loss: none.        The duodenal bulb, first portion of the duodenum, second portion of the        duodenum and examined duodenum were normal.                                                                                    Impression:   - Normal esophagus.                          - No endoscopic esophageal abnormality to explain                          patient's dysphagia. Esophagus dilated. Dilated.                          - Z-line regular, 38 cm from the incisors.                          - 2 cm hiatal hernia.                          - Gastroesophageal flap valve classified as Hill Grade                          III (minimal fold, loose to endoscope, hiatal hernia                          likely).                          - Erythematous mucosa in the gastric body and antrum.                          Biopsied.                          - Normal duodenal bulb, first portion of the duodenum,                          second portion of the duodenum and examined duodenum.     Final Diagnosis   A. STOMACH, BIOPSY:  - Gastric mucosa with mild chronic inactive gastritis  - Negative for H. pylori-like organisms on routine staining  - Negative for intestinal metaplasia or dysplasia        8/16/2022   Findings:       The examined esophagus was normal. Normal secondary peristalsis        observered, without any tertiary contractions.        Esophagogastric landmarks were identified: the Z-line was found at 37        cm, the gastroesophageal junction was found at 37 cm and the site of        hiatal narrowing was found at 37 cm from the incisors.        A 3 cm hiatal hernia was present.        No other significant abnormalities were identified in a careful        examination of the stomach.        The cardia and  gastric fundus were normal on retroflexion.        The in the duodenum was normal.                                                                                    Impression:               - Normal esophagus.                             - Esophagogastric landmarks identified.                             - 3 cm hiatal hernia. Otherwise normal stomach.                             - Normal duodenum.                             - No specimens collected.     Colonoscopy:    [facility]    Findings:   Three sessile polyps were found in the sigmoid colon, descending colon   and appendiceal orifice. The polyps were 3 to 4 mm in size. These polyps   were removed with a cold snare. Resection and retrieval were complete.   Four sessile polyps were found in the transverse colon and cecum. The   polyps were 1 to 2 mm in size. These polyps were removed with a cold   biopsy forceps. Resection and retrieval were complete.   Multiple small and large-mouthed diverticula were found in the sigmoid   colon, descending colon, splenic flexure, hepatic flexure, ascending   colon and cecum.     Impression: - Three 3 to 4 mm polyps in the sigmoid colon, in the   descending colon and at the appendiceal orifice, removed   with a cold snare. Resected and retrieved.   - Four 1 to 2 mm polyps in the transverse colon and in the   cecum, removed with a cold biopsy forceps. Resected and   retrieved.   - Diverticulosis in the sigmoid colon, in the descending   colon, at the splenic flexure, at the hepatic flexure, in   the ascending colon and in the cecum.   Recommendation:    - Discharge patient to home.   - Return to referring physician.   - Repeat colonoscopy in 3 years for surveillance.      Final Diagnosis:   A.  Colon, cecum and appendiceal, polyp, biopsy -   Tubular adenoma, multiple fragments.   B.  Colon, transverse, polyp, biopsy -  Tubular adenoma.   C.  Colon, descending and sigmoid, polyp, biopsy -    Tubular adenoma, multiple  "fragments.  Sessile serrated adenoma.     EndoFLIP directed at the UES or LES (8cm (EF-325) balloon length or 16cm (EF-322) balloon length):   Date:  8cm balloon  Balloon inflation Balloon pressure CSA (mm^2) DI (mm^2/mmHg) Dmin (mm) Compliance   20 (ladmark ID)        30        40        50           16cm balloon  Balloon inflation Balloon pressure CSA (mm^2) DI (mm^2/mmHg) Dmin (mm) Compliance   30 (ladmark ID)        40        50        60        70           High Resolution Manometry:    6/28/2023   Impression    Interpretation / Findings  \"The baseline tone of the lower esophageal sphincter was normotensive. The lower esophageal sphincter was able to relax appropriately as measured by the IRP: supine median 12.3 mm Hg, upright median 10.4 mm Hg. The EGJ morphology was type II. There was peristalsis visible. The DCI, DL, and contractile pattern were/were not within normal limits. There were 10/10 swallows with elevated intrabolus pressure and compartmentalized pressurization. Rapid water swallows were performed with normal augmentation. Rapid Drink Challenge does not indicate an elevated IRP. Supine liquid swallows were performed. Upright liquid swallows were performed. Bolus transit as measured by impedance was complete in 10/10 swallows. This is most consistent with absence of major disorder of esophageal peristalsis. Upper esophageal sphincter pressure somewhat elevated but clinical significant not clear.      Wording of procedure description and interpretation was adapted from [facility] [employer] Weekly Motility Conference (2018).\"       Impressions  There was no evidence of major disorder of esophageal peristalsis per Greenwood 4.0 classification. Consider work up for alternate causes of dysphagia including speech path study with modified barium swallow if not done.       PH/Impedance:     Echeverria:    CT:    5/25/2023 CT AP W Contrast   IMPRESSION:  1.  Unchanged right upper lobe nodular " consolidation. However, there is new nodular/masslike area of consolidation anteriorly and laterally. While this may reflect focal pneumonitis, neoplastic progression would be difficult to exclude. Consider further   evaluation with PET/CT or close imaging surveillance with repeat CT in 3 months.  2.  Unchanged scarlike right apical nodule, which is of doubtful clinical significance.  3.  Moderate emphysema.  4.  No new lymphadenopathy.    Esophagram:    5/20/2022 Esophagram   FINDINGS:   The esophagus demonstrates tertiary contractions suggestive of  dysmotility. There is no evidence of intrinsic or extrinsic mass or  polyp. No constricting or obstructing lesion is identified. There is  no mucosal ulceration. Mucosal folds are normal in appearance. Small  hiatal hernia. Spontaneous gastroesophageal reflux is visualized at  the level of the clavicle in the RPO position with head elevated  approximately 30 degrees.                                                                      IMPRESSION: Small hiatal hernia with spontaneous gastroesophageal  reflux up to the level of the clavicle is visualized. Tertiary  contractions suggestive of esophageal dysmotility.      FL VSS:     GES:    U/S:     XRAY:    Other:       Prior medical records were reviewed including, but not limited to, notes from referring providers, lab work, radiographic tests, and other diagnostic tests. Pertinent results were summarized above.     History     Past Medical History:   Diagnosis Date    Adenocarcinoma, lung (H)     Asthma     Ectopic pregnancy     Esophageal reflux     Pulmonary emphysema (H)     Very severe FEV1<30% predicted    Type II diabetes mellitus (H)        Past Surgical History:   Procedure Laterality Date    2/8/16                R thoracotomy, RLL lobectomy (Dr. Cunha). Adenocarcinoma, 1.1 cm, assoicated with atypical adenomatous hyperplasia Right 02/08/2016    R thoracotomy, RLL lobectomy (Dr. Cunha). Adenocarcinoma,  1.1 cm, assoicated with atypical adenomatous hyperplasia    BRONCHOSCOPY, WITH BIOPSY, ROBOT ASSISTED N/A 2023    Procedure: robot assisted Ion BRONCHOSCOPY, WITH BIOPSY;  Surgeon: Patria Garcia MD;  Location:  OR    ENDOBRONCHIAL ULTRASOUND FLEXIBLE N/A 2023    Procedure: Endobronchial ultrasound flexible;  Surgeon: Patria Garcia MD;  Location:  OR    ESOPHAGOSCOPY, GASTROSCOPY, DUODENOSCOPY (EGD), COMBINED N/A 2022    Procedure: ESOPHAGOGASTRODUODENOSCOPY (EGD);  Surgeon: Travis Briseno MD;  [facility]: [facility]    ESOPHAGOSCOPY, GASTROSCOPY, DUODENOSCOPY (EGD), COMBINED N/A 2023    Procedure: ESOPHAGOGASTRODUODENOSCOPY, WITH BIOPSY;  Surgeon: Chao Rodrigues DO;  Location:  GI    ORTHOPEDIC SURGERY      PHACOEMULSIFICATION CLEAR CORNEA WITH STANDARD INTRAOCULAR LENS IMPLANT Left 2023    Procedure: LEFT EYE PHACOEMULSIFICATION, CATARACT, WITH INTRAOCULAR LENS IMPLANT;  Surgeon: Re Keita MD;  Location: [facility] OR    PHACOEMULSIFICATION CLEAR CORNEA WITH STANDARD INTRAOCULAR LENS IMPLANT Right 2023    Procedure: RIGHT EYE PHACOEMULSIFICATION, CATARACT, WITH INTRAOCULAR LENS IMPLANT;  Surgeon: Re Keita MD;  Location: [facility] OR       Social History     Socioeconomic History    Marital status: Single     Spouse name: Not on file    Number of children: Not on file    Years of education: Not on file    Highest education level: Not on file   Occupational History    Not on file   Tobacco Use    Smoking status: Former     Current packs/day: 0.00     Types: Cigarettes     Quit date:      Years since quittin.3     Passive exposure: Past    Smokeless tobacco: Never    Tobacco comments:     2023 Patient using 14 mg patch, wants to have prescription for Nicotrol inhaler, took workbook   Substance and Sexual Activity    Alcohol use: Not Currently    Drug use: No    Sexual activity: Not Currently     Partners: Male   Other Topics Concern    Not on file   Social History  Narrative    Ms. Aparicio lives in an apartment complex by herself as of May 2022. She has frequent smoke exposure from neighbors even when she is not smoking herself.      Social Determinants of Health     Financial Resource Strain: Not on file   Food Insecurity: Not on file   Transportation Needs: Not on file   Physical Activity: Not on file   Stress: Not on file   Social Connections: Not on file   Interpersonal Safety: Low Risk  (4/9/2024)    Interpersonal Safety     Do you feel physically and emotionally safe where you currently live?: Yes     Within the past 12 months, have you been hit, slapped, kicked or otherwise physically hurt by someone?: No     Within the past 12 months, have you been humiliated or emotionally abused in other ways by your partner or ex-partner?: No   Housing Stability: Not on file       Family History   Problem Relation Age of Onset    Thyroid Disease Mother     Cerebrovascular Disease Mother     Hypertension Mother     Hypertension Father     Glaucoma Father     Cancer Sister     Lung Cancer Sister     Diabetes Brother     Cancer Brother     Diabetes Brother     Cancer Brother     Diabetes Brother     Deep Vein Thrombosis (DVT) Daughter     Depression Daughter     Alcohol/Drug Other         self    Diabetes Other         self    Thyroid Disease Other         self    Asthma Other         self    Macular Degeneration No family hx of     Anesthesia Reaction No family hx of      Family history reviewed and edited as appropriate    Medications and Allergies:     Outpatient Encounter Medications as of 4/29/2024   Medication Sig Dispense Refill    acetylcysteine (MUCOMYST) 10 % nebulizer solution Inhale 4 mLs into the lungs 4 times daily as needed for mucolysis/respiratory distress 30 mL 5    albuterol (PROAIR HFA/PROVENTIL HFA/VENTOLIN HFA) 108 (90 Base) MCG/ACT inhaler USE 1 OR 2 INHALATIONS EVERY 4 HOURS AS NEEDED 17 g 2    albuterol (PROVENTIL) (2.5 MG/3ML) 0.083% neb solution Take 1 vial  (2.5 mg) by nebulization 4 times daily 360 mL 0    Alpha-Lipoic Acid 300 MG TABS Take 300 mg by mouth 2 times daily 100 tablet 11    alpha-lipoic acid 600 MG capsule Take 1 capsule (600 mg) by mouth daily 90 capsule 3    aspirin 81 MG EC tablet Take 1 tablet (81 mg) by mouth every morning 90 tablet 3    atorvastatin (LIPITOR) 10 MG tablet Take 1 tablet (10 mg) by mouth daily 90 tablet 3    blood glucose (ONETOUCH ULTRA) test strip       capsaicin (ZOSTRIX) 0.025 % external cream Apply a thin amount topically 3 times daily as needed (neuropathy in feet.) 25 g 11    carboxymethylcellulose (REFRESH LIQUIGEL) 1 % ophthalmic solution Apply 1 drop to eye 4 times daily 15 mL 4    cetirizine (ZYRTEC) 10 MG tablet Take 1 tablet (10 mg) by mouth every morning 90 tablet 3    Continuous Blood Gluc  (DEXCOM G7 ) JOSEPHINE Use to read blood sugars as per 's instructions. 1 each 0    Continuous Blood Gluc Sensor (DEXCOM G7 SENSOR) MISC Change every 10 days. 3 each 5    Continuous Blood Gluc Sensor (FREESTYLE GIOVANNI 2 SENSOR) MISC 1 each every 14 days For use with Freestyle Giovanni 2  for continuous monitioring of blood glucose levels. Replace sensor every 14 days. 2 each 11    cyanocobalamin (VITAMIN B-12) 500 MCG tablet Take 1 tablet (500 mcg) by mouth daily (Patient taking differently: Take 500 mcg by mouth every morning) 90 tablet 1    cyclobenzaprine (FLEXERIL) 5 MG tablet Take 1 tablet (5 mg) by mouth 3 times daily as needed for muscle spasms 21 tablet 0    DULoxetine (CYMBALTA) 30 MG capsule Take 1 capsule by mouth for one week and then increase to 2 capsules daily if tolerated. 60 capsule 1    empagliflozin (JARDIANCE) 25 MG TABS tablet Take 1 tablet (25 mg) by mouth every morning 90 tablet 0    EPINEPHrine (ANY BX GENERIC EQUIV) 0.3 MG/0.3ML injection 2-pack Inject 0.3 mLs (0.3 mg) into the muscle as needed for anaphylaxis (related to bee stings) May repeat one time in 5-15 minutes if response  to initial dose is inadequate. 2 each 1    fluticasone (FLONASE) 50 MCG/ACT nasal spray Spray 1 spray into both nostrils 2 times daily Use at night before bed 15.8 mL 3    Fluticasone-Umeclidin-Vilanterol (TRELEGY ELLIPTA) 200-62.5-25 MCG/ACT oral inhaler USE 1 INHALATION DAILY 28 each 5    glipiZIDE (GLUCOTROL) 5 MG tablet Take 5 mg by mouth      GLUCOSAMINE-CHONDROITIN -400 MG tablet TAKE 1 TABLET DAILY (Patient taking differently: Take 1 tablet by mouth every evening) 90 tablet 3    ipratropium - albuterol 0.5 mg/2.5 mg/3 mL (DUONEB) 0.5-2.5 (3) MG/3ML neb solution Take 1 vial (3 mLs) by nebulization every 6 hours as needed for shortness of breath, wheezing or cough 90 mL 1    losartan (COZAAR) 50 MG tablet Take 1 tablet (50 mg) by mouth daily 90 tablet 3    olopatadine (PATADAY) 0.2 % ophthalmic solution Place 0.05 mLs (1 drop) into both eyes daily 2.5 mL 11    omega-3 acid ethyl esters (LOVAZA) 1 g capsule Take 2 capsules (2 g) by mouth 2 times daily (Patient taking differently: Take 1 g by mouth 2 times daily Take 2 capsules (2 g) by mouth 2 times daily) 360 capsule 3    omeprazole (PRILOSEC) 20 MG DR capsule Take 20 mg by mouth      polyethylene glycol-propylene glycol (SYSTANE) 0.4-0.3 % SOLN ophthalmic solution INSTILL 1 DROP IN BOTH EYES FOUR TIMES DAILY      pregabalin (LYRICA) 150 MG capsule TAKE 1 CAPSULE(150 MG) BY MOUTH TWICE DAILY (Patient taking differently: Take 150 mg by mouth 2 times daily TAKE 1 CAPSULE(150 MG) BY MOUTH TWICE DAILY) 270 capsule 1    roflumilast (DALIRESP) 500 MCG TABS tablet Take 1 tablet (500 mcg) by mouth every morning 90 tablet 3    STATIN NOT PRESCRIBED (INTENTIONAL) Please choose reason not prescribed from choices below.      tirzepatide (MOUNJARO) 2.5 MG/0.5ML pen Inject 2.5 mg Subcutaneous every 7 days 2 mL 0     No facility-administered encounter medications on file as of 4/29/2024.        Allergies   Allergen Reactions    Aspirin      325mg     Bee Venom  "Anaphylaxis    Penicillins Hives    Azithromycin Dizziness    Colon Care     Interferons Dermatitis        Review of systems:  A full 10 point review of systems was obtained and was negative except for the pertinent positives and negatives stated within the HPI.    Objective Findings:   Physical Exam:    Constitutional: /75   Pulse 98   Ht 1.676 m (5' 6\")   Wt 93.7 kg (206 lb 8 oz)   SpO2 95%   BMI 33.33 kg/m    General: Alert, cooperative, no distress, well-appearing. Uses walker.   Head: Atraumatic, normocephalic, no obvious abnormalities   Eyes: Sclera anicteric, no obvious conjunctival hemorrhage   Nose: Nares normal, no obvious malformation, no obvious rhinorrhea   Respiratory: Resting comfortably, no apparent distress, no cough.   Gastrointestinal: Soft, non distended  Skin: No jaundice, no obvious rash  Neurologic: AAOx3, no obvious neurologic abnormality  Psychiatric: Normal Affect, appropriate mood  Extremities: No obvious edema, no obvious malformation     Labs, Radiology, Pathology     Lab Results   Component Value Date    WBC 6.9 01/18/2024    WBC 11.5 (H) 11/27/2023    WBC 6.4 10/26/2023    HGB 13.9 01/18/2024    HGB 14.2 11/27/2023    HGB 13.4 10/26/2023     01/18/2024     11/27/2023     10/26/2023    CHOL 166 11/27/2023    CHOL 220 (H) 10/26/2023    CHOL 251 (H) 01/27/2022    TRIG 64 11/27/2023    TRIG 96 10/26/2023    TRIG 127 01/27/2022    HDL 76 11/27/2023    HDL 55 10/26/2023    HDL 58 01/27/2022    ALT 9 01/18/2024    ALT 8 11/27/2023    ALT 10 10/26/2023    AST 17 01/18/2024    AST 17 11/27/2023    AST 18 10/26/2023     03/07/2024     01/18/2024     11/27/2023    BUN 5.2 (L) 03/07/2024    BUN 5.7 (L) 01/18/2024    BUN 12.1 11/27/2023    CO2 29 03/07/2024    CO2 30 (H) 01/18/2024    CO2 31 (H) 11/27/2023    TSH 3.79 10/26/2023    TSH 2.65 08/31/2022    TSH 1.50 02/07/2008    INR 1.02 02/03/2023    INR 1.02 02/23/2021    INR 0.97 04/30/2008    "     Liver Function Studies -   Recent Labs   Lab Test 05/25/23  0909   PROTTOTAL 7.0   ALBUMIN 4.0   BILITOTAL 0.3   ALKPHOS 89   AST 14   ALT 9*          Patient Active Problem List    Diagnosis Date Noted    Class 2 severe obesity due to excess calories with serious comorbidity in adult (H) 10/27/2023     Priority: Medium    Combined forms of age-related cataract of both eyes 05/31/2023     Priority: Medium     Added automatically from request for surgery [id number]      Acute and chronic respiratory failure with hypoxia (H) 01/01/2023     Priority: Medium    Gastroesophageal reflux disease without esophagitis 05/24/2022     Priority: Medium    Esophageal dysphagia 05/24/2022     Priority: Medium    Shortness of breath 05/21/2022     Priority: Medium    Chest pain, unspecified type 05/21/2022     Priority: Medium    Hypokalemia 05/21/2022     Priority: Medium    Hypoxia 05/20/2022     Priority: Medium    Diabetic neuropathy (H) 11/18/2021     Priority: Medium    Malnutrition (H24) 06/25/2021     Priority: Medium    Chronic obstructive pulmonary disease, unspecified COPD type (H) 02/23/2021     Priority: Medium    Primary lung adenocarcinoma (H) STAGE: IA2 [id number] poorly diff adeno RLL, then [id number] IA2 suspected NSCLC RUL, medically inoperable  12/15/2020     Priority: Medium    Pulmonary emphysema (H) 09/22/2020     Priority: Medium    Type 2 diabetes mellitus without complication, without long-term current use of insulin (H) 01/09/2019     Priority: Medium    Cervical stenosis (uterine cervix) 03/18/2011     Priority: Medium    Vaginitis 03/14/2011     Priority: Medium    Postmenopausal bleeding 03/14/2011     Priority: Medium      Assessment and Plan   Assessment/Plan:    Jenn Aparicio is a 68 year old female with significant past medical history pertinent for serve COPD on home O2 (3-4L), RLL adenocarcinoma of the lung s/p lobectomy (2/2016), RUL NSCLC s/p SBRT (1/2020) acute on chronic hypoxic respiratory  failure, DM type II complicated by diabetic neuropathy, who is presenting as a follow up patient with a chief complaint of reflux and dysphagia.    #Chronic Dysphagia - Resolved  #GERD    #Hiatal Hernia 2-3 cm     Prior Evaluation Includes:    5/20/2022 esophagram with small hiatal hernia with spontaneous reflux to the level of the clavicle and tertiary contractions     6/28/2023 normal high-resolution manometry study    8/8/2023 EGD with dilation of the cricopharyngeus to 16 mm with a TTS balloon.  Findings also notable for 2 cm hiatal hernia.  There was mild patchy erythematous mucosa within the gastric body/antrum.  Biopsies with mild chronic inactive gastritis. Negative for H. pylori and intestinal metaplasia.    Today Jenn again reports that symptoms of dysphagia have been stable since EGD with dilation 8/8/2023. With solid and pill dysphagia occurring at most once or twice per month. Symptoms may be consistent with oropharyngeal etiology as patient does have poor dentition.  Additional findings were pertinent for pooling within the posterior oropharynx/hypopharynx and in the vallecula on upper endoscopy. With these concerns/findings formal video swallow study will be performed. In regards to Jenn's reflux symptoms this is well-controlled with her current twice daily acid suppressive regimen however unknown to which medication she is taking at which dose. Again today emphasis was placed on weight loss and the impacts this can have on both the regression of a small hiatal hernia and benefits towards reflux symptoms.     - Reflux lifestyle modifications as directed within the AVS  - Obtain a wedge pillow and sleep with head end of the best elevated   - Do not eat or drink for at least 4 hours prior to laying down for bed  - Message to providers to clarify medications/acid suppressive medication   - Formal videos swallow study. To schedule imaging, please call [phone]   - Follow up with your dentist  regarding poor dietician and to consider dentures.   - If imaging is unrevealing and swallowing concerns increase would then consider upper endoscopy with repeat dilation   - Colonoscopy for colon cancer screening scheduled for 9/5/2024    #Colorectal Cancer Screening   Colonoscopy 2018 with >3 adenomatous polyps. Recall 3 years.  No family history of CRC. Per the most recent update by the [place] Multi-Society Task Force on Colorectal Cancer recall should be 3 years, 2021 or sooner if otherwise indicated.     - Colonoscopy for colon cancer screening scheduled for 9/5/2024    Follow up plan:   Return to clinic 4 months and as needed.    The risks and benefits of my recommendations, as well as other treatment options were discussed with the patient and any available family today. All questions were answered.     Follow up: As planned above. Today, I personally spent 22 minutes in direct face to face time with the patient, of which greater than 50% of the time was spent in patient education and counseling as described above. Approximately 8 minutes were spent on indirect care associated with the patient's consultation including but not limited to review of: patient medical records to date, clinic visits, hospital records, lab results, imaging studies, procedural documentation, and coordinating care with other providers. The findings from this review are summarized in the above note. All of the above accounted for a cumulative time of 30 minutes and was performed on the date of service.     The patient verbalized understanding of the plan and was appreciative for the time spent and information provided during the office visit.           Gabriela Damon PA-C  Division of Gastroenterology, Hepatology, and Nutrition  [facility]       Documentation assisted by voice recognition and documentation system.

## 2024-04-29 NOTE — LETTER
4/29/2024         RE: Jenn Aparicio  [address]  [place] [address]        Dear Colleague,    Thank you for referring your patient, Jenn Aparicio, to the [facility] [place]. Please see a copy of my visit note below.    Gastroenterology Visit for: Jenn Aparicio 1955   MRN: [id number]     Reason for Visit:  chief complaint    Referred by: Self  / No address on file  Patient Care Team:  Mitzi Nettles APRN CNP as PCP - General (Nurse Practitioner)  Zarina Leung MD as MD (Internal Medicine-Hematology & Oncology)  Latesha Christianson, JOHANN as Clinic Care Coordinator  Zarina Leung MD as Assigned Cancer Care Provider  Mitzi Nettles APRN CNP as Assigned PCP  Lynda Spann MD as Fellow (Pulmonary Disease)  La Nena Shabazz MD as MD (Endocrinology, Diabetes, and Metabolism)  Nithya Narvaez RN as Diabetes Educator (Diabetes Education)  Nithya Narvaez RN as Diabetes Educator (Diabetes Education)  La Nena Shabazz MD as Assigned Endocrinology Provider  Kush Santamaria MD as MD (Internal Medicine)  Kailee Moralez MD as Resident (Student in organized health care education/training program)  Gab Walden MD as Hospitalist (Internal Medicine)  Re Keita MD as MD (Ophthalmology)  Samy Prasad [facility] as Assigned MTM Pharmacist  Earl Ureña MD as Assigned Pulmonology Provider  Re Keita MD as Assigned Surgical Provider  Ana Davey, RN as Specialty Care Coordinator (Hematology & Oncology)  Gabriela Damon PA-C as Assigned Gastroenterology Provider    History of Present Illness:   Jenn Aparicio is a 68 year old female with significant past medical history pertinent for serve COPD on home O2 (3-4L), RLL adenocarcinoma of the lung s/p lobectomy (2/2016), RUL NSCLC s/p SBRT (1/2020) acute on chronic hypoxic respiratory failure, DM type II complicated by diabetic neuropathy, who is presenting as a follow up patient with a chief  "complaint of reflux and dysphagia.    -----------------------------------------------------------------------------------------------------------------------------------------------------------------------------------------------------------------------------------  Interval History April 29, 2024:      It is unknown which acid suppressive medication Jenn is taking. She believes that she may be taking Omeprazole 20 mg possibly twice daily and with this regimen symptoms of reflux are well controlled. If eating late at night will experience rare breakthrough symptoms. Notes she has to eat earlier in the day.      Notes that she has poor dietician. Total of \"6 little pegs in the front.\"  No change in dysphagia symptoms since dilation 8/8/2023. Symptoms are occurring to solids occurring 1-2 times per month. Will cut pills in 1/2 if they are large. Denies dysphagia to semi solids and liquids.      Started Hira last week.      Denies unintentional weight loss, nausea, emesis, odynophagia, diarrhea, constipation, nocturnal stooling, incontinence, melena, hematochezia and BRBR.   -----------------------------------------------------------------------------------------------------------------------------------------------------------------------------------------------------------------------------------  Interval History December 18, 2023:    Seen by oncology 12/13/2023 with concerns for reoccurrence of disease with plan for repeat PET/possible targeted biopsies.      Since the increase in Ozempic has had increase in regurgitation and dysgeusia despite use of Omeprazole 40 mg BID.  Prior to escalation reflux/regurgitation was well controlled.      Additionally she has had an increase in gas/distention and generalized abdominal cramping also with the increase of the Ozempic. The symptoms have no association with defecation/oral intake.      Associated with gait imbalance, feeling of instability, leg cramping and fatigue. "      Denies weight loss, nausea, emesis, dysphagia, odynophagia, bloating/fullness, post prandial bloating, diarrhea, constipation, nocturnal stooling, incontinence, melena, hematochezia and BRBR.     -----------------------------------------------------------------------------------------------------------------------------------------------------------------------------------------------------------------------------------    Interval History September 18, 2023:    CBC CMP 5/2023 WNL.     Since the endoscopy 8/8/2023 she has had no problems with dysphagia.     Currently she is experiencing heartburn/regurgitation daily after each meal. Currently she is taking Omeprazole 20mg twice daily 30 - 60 minutes prior to meals.  Noting lemon, chocolate are a dietary trigger for reflux.     Symptoms of reflux have worsened since initiation of Ozempic.     Denies weight loss, nausea, emesis, dysphagia, odynophagia, abdominal pain, diarrhea, constipation (< 3 stools per week), nocturnal stooling, incontinence of feces, melena, hematochezia and BRBR.     Brother possibly had pancreatic cancer? Patient is not certain to the details    No additional family history or GI related malignancy (esophageal, gastric, pancreatic, liver or colon).     ------------------------------------------------------------------------------------------------------------------------------------------------------------------------------------------------------------------------------------------    7/2023 Dr. BR[facility] [place]    History of Present Illness:   This is a patient evidence following for the last few years with severe acid reflux associate with emphysema also dysphagia which has been difficult to pin down she had initially an esophagram which are demonstrated some tertiary contractions and EGD follow-up of this which did not demonstrate any abnormalities with exception of a hiatal hernia.  The patient went on to have an esophageal manometry  recently which is read as normal.  The patient's continue to have difficulty swallowing sometimes regurgitating food back up is not sure from where when I talk about her chest or areas that she feels sensations it is difficult for her to answer that.  She sometimes feels like she has a sore throat and cannot swallow.    Esophageal Questionnaire(s)    BEDQ Questionnaire      6/28/2023     2:12 PM 12/18/2023     7:59 AM 4/29/2024     8:19 AM   BEDQ Questionnaire: How Often Have You Had the Following?   Trouble eating solid food (meat, bread, vegetables) 4 4 2   Trouble eating soft foods (yogurt, jello, pudding) 0     Trouble swallowing liquids 4     Pain while swallowing 0     Coughing or choking while swallowing foods or liquids 2     Total Score: 10           6/28/2023     2:12 PM 4/29/2024     8:19 AM   BEDQ Questionnaire: Discomfort/Pain Ratings   Eating solid food (meat, bread, vegetables) 0 3   Eating soft foods (yogurt, jello, pudding) 0    Drinking liquid 0    Total Score: 0        Eckardt Questionnaire      6/28/2023     2:12 PM 12/18/2023     7:59 AM   Eckardt Questionnaire   Dysphagia 2 2   Regurgitation 3 2   Retrosternal Pain 0 0   Weight Loss (kg) 0 0   Total Score:  5 4       Promis 10 Questionnaire      12/18/2023     8:01 AM 4/29/2024     8:27 AM   PROMIS 10 FLOWSHEET DATA   In general, would you say your health is: 2 2   In general, would you say your quality of life is: 5 3   In general, how would you rate your physical health? 1 2   In general, how would you rate your mental health, including your mood and your ability to think? 5 4   In general, how would you rate your satisfaction with your social activities and relationships? 3 3   In general, please rate how well you carry out your usual social activities and roles. (This includes activities at home, at work and in your community, and responsibilities as a parent, child, spouse, employee, friend, etc.) 3 2   To what extent are you able to carry  out your everyday physical activities such as walking, climbing stairs, carrying groceries, or moving a chair? 2 2   In the past 7 days, how often have you been bothered by emotional problems such as feeling anxious, depressed, or irritable? 2 2   In the past 7 days, how would you rate your fatigue on average? 3 4   In the past 7 days, how would you rate your pain on average, where 0 means no pain, and 10 means worst imaginable pain? 9 9   Mental health question re-calculation - no clinical value 4 4   Physical health question re-calculation - no clinical value 3 2   Pain question re-calculation - no clinical value 2 2   Global Mental Health Score 17 14   Global Physical Health Score 8 8   PROMIS TOTAL - SUBSCORES 25 22       STUDIES & PROCEDURES:    EGD:     8/8/2023 Dr. Rodrigues  Findings:       There was pooling of thin secretions in the posterior        oropharynx/hypopharynx and in the vallecula. This was suctioned by the        anesthesia team repeatedly throughout the procedure        The examined esophagus was normal.        No endoscopic abnormality was evident in the esophagus to explain the        patient's complaint of dysphagia. It was decided, however, to proceed        with dilation at the cricopharyngeus. A TTS dilator was passed through        the scope. Dilation with 11-12-13.5-15-16mm dilator was performed. The        dilation site was examined and showed no change. Estimated blood loss:        none.        The Z-line was regular and was found 38 cm from the incisors.        A 2 cm hiatal hernia was found. The proximal extent of the gastric folds        (end of tubular esophagus) was 38 cm from the incisors. The hiatal        narrowing was 40 cm from the incisors.        The gastroesophageal flap valve was visualized endoscopically and        classified as Hill Grade III (minimal fold, loose to endoscope, hiatal        hernia likely).        Patchy moderately erythematous mucosa without bleeding was  found in the        gastric body and in the gastric antrum. Biopsies were taken with a cold        forceps for Helicobacter pylori testing. Verification of patient        identification for the specimen was done. Estimated blood loss: none.        The duodenal bulb, first portion of the duodenum, second portion of the        duodenum and examined duodenum were normal.                                                                                    Impression:   - Normal esophagus.                          - No endoscopic esophageal abnormality to explain                          patient's dysphagia. Esophagus dilated. Dilated.                          - Z-line regular, 38 cm from the incisors.                          - 2 cm hiatal hernia.                          - Gastroesophageal flap valve classified as Hill Grade                          III (minimal fold, loose to endoscope, hiatal hernia                          likely).                          - Erythematous mucosa in the gastric body and antrum.                          Biopsied.                          - Normal duodenal bulb, first portion of the duodenum,                          second portion of the duodenum and examined duodenum.     Final Diagnosis   A. STOMACH, BIOPSY:  - Gastric mucosa with mild chronic inactive gastritis  - Negative for H. pylori-like organisms on routine staining  - Negative for intestinal metaplasia or dysplasia        8/16/2022   Findings:       The examined esophagus was normal. Normal secondary peristalsis        observered, without any tertiary contractions.        Esophagogastric landmarks were identified: the Z-line was found at 37        cm, the gastroesophageal junction was found at 37 cm and the site of        hiatal narrowing was found at 37 cm from the incisors.        A 3 cm hiatal hernia was present.        No other significant abnormalities were identified in a careful        examination of the stomach.         The cardia and gastric fundus were normal on retroflexion.        The in the duodenum was normal.                                                                                    Impression:               - Normal esophagus.                             - Esophagogastric landmarks identified.                             - 3 cm hiatal hernia. Otherwise normal stomach.                             - Normal duodenum.                             - No specimens collected.     Colonoscopy:    [facility]    Findings:   Three sessile polyps were found in the sigmoid colon, descending colon   and appendiceal orifice. The polyps were 3 to 4 mm in size. These polyps   were removed with a cold snare. Resection and retrieval were complete.   Four sessile polyps were found in the transverse colon and cecum. The   polyps were 1 to 2 mm in size. These polyps were removed with a cold   biopsy forceps. Resection and retrieval were complete.   Multiple small and large-mouthed diverticula were found in the sigmoid   colon, descending colon, splenic flexure, hepatic flexure, ascending   colon and cecum.     Impression: - Three 3 to 4 mm polyps in the sigmoid colon, in the   descending colon and at the appendiceal orifice, removed   with a cold snare. Resected and retrieved.   - Four 1 to 2 mm polyps in the transverse colon and in the   cecum, removed with a cold biopsy forceps. Resected and   retrieved.   - Diverticulosis in the sigmoid colon, in the descending   colon, at the splenic flexure, at the hepatic flexure, in   the ascending colon and in the cecum.   Recommendation:    - Discharge patient to home.   - Return to referring physician.   - Repeat colonoscopy in 3 years for surveillance.      Final Diagnosis:   A.  Colon, cecum and appendiceal, polyp, biopsy -   Tubular adenoma, multiple fragments.   B.  Colon, transverse, polyp, biopsy -  Tubular adenoma.   C.  Colon, descending and sigmoid, polyp, biopsy -    Tubular  "adenoma, multiple fragments.  Sessile serrated adenoma.     EndoFLIP directed at the UES or LES (8cm (EF-325) balloon length or 16cm (EF-322) balloon length):   Date:  8cm balloon  Balloon inflation Balloon pressure CSA (mm^2) DI (mm^2/mmHg) Dmin (mm) Compliance   20 (ladmark ID)        30        40        50           16cm balloon  Balloon inflation Balloon pressure CSA (mm^2) DI (mm^2/mmHg) Dmin (mm) Compliance   30 (ladmark ID)        40        50        60        70           High Resolution Manometry:    6/28/2023   Impression    Interpretation / Findings  \"The baseline tone of the lower esophageal sphincter was normotensive. The lower esophageal sphincter was able to relax appropriately as measured by the IRP: supine median 12.3 mm Hg, upright median 10.4 mm Hg. The EGJ morphology was type II. There was peristalsis visible. The DCI, DL, and contractile pattern were/were not within normal limits. There were 10/10 swallows with elevated intrabolus pressure and compartmentalized pressurization. Rapid water swallows were performed with normal augmentation. Rapid Drink Challenge does not indicate an elevated IRP. Supine liquid swallows were performed. Upright liquid swallows were performed. Bolus transit as measured by impedance was complete in 10/10 swallows. This is most consistent with absence of major disorder of esophageal peristalsis. Upper esophageal sphincter pressure somewhat elevated but clinical significant not clear.      Wording of procedure description and interpretation was adapted from [employer] [facility] [employer] Weekly Motility Conference (2018).\"       Impressions  There was no evidence of major disorder of esophageal peristalsis per Milbank 4.0 classification. Consider work up for alternate causes of dysphagia including speech path study with modified barium swallow if not done.       PH/Impedance:     Echeverria:    CT:    5/25/2023 CT AP W Contrast   IMPRESSION:  1.  Unchanged right " upper lobe nodular consolidation. However, there is new nodular/masslike area of consolidation anteriorly and laterally. While this may reflect focal pneumonitis, neoplastic progression would be difficult to exclude. Consider further   evaluation with PET/CT or close imaging surveillance with repeat CT in 3 months.  2.  Unchanged scarlike right apical nodule, which is of doubtful clinical significance.  3.  Moderate emphysema.  4.  No new lymphadenopathy.    Esophagram:    5/20/2022 Esophagram   FINDINGS:   The esophagus demonstrates tertiary contractions suggestive of  dysmotility. There is no evidence of intrinsic or extrinsic mass or  polyp. No constricting or obstructing lesion is identified. There is  no mucosal ulceration. Mucosal folds are normal in appearance. Small  hiatal hernia. Spontaneous gastroesophageal reflux is visualized at  the level of the clavicle in the RPO position with head elevated  approximately 30 degrees.                                                                      IMPRESSION: Small hiatal hernia with spontaneous gastroesophageal  reflux up to the level of the clavicle is visualized. Tertiary  contractions suggestive of esophageal dysmotility.      FL VSS:     GES:    U/S:     XRAY:    Other:       Prior medical records were reviewed including, but not limited to, notes from referring providers, lab work, radiographic tests, and other diagnostic tests. Pertinent results were summarized above.     History     Past Medical History:   Diagnosis Date    Adenocarcinoma, lung (H)     Asthma     Ectopic pregnancy     Esophageal reflux     Pulmonary emphysema (H)     Very severe FEV1<30% predicted    Type II diabetes mellitus (H)        Past Surgical History:   Procedure Laterality Date    2/8/16                R thoracotomy, RLL lobectomy (Dr. Cunha). Adenocarcinoma, 1.1 cm, assoicated with atypical adenomatous hyperplasia Right 02/08/2016    R thoracotomy, RLL lobectomy (Dr. Cunha).  Adenocarcinoma, 1.1 cm, assoicated with atypical adenomatous hyperplasia    BRONCHOSCOPY, WITH BIOPSY, ROBOT ASSISTED N/A 2023    Procedure: robot assisted Ion BRONCHOSCOPY, WITH BIOPSY;  Surgeon: Patria Garcia MD;  Location:  OR    ENDOBRONCHIAL ULTRASOUND FLEXIBLE N/A 2023    Procedure: Endobronchial ultrasound flexible;  Surgeon: Patria Garcia MD;  Location:  OR    ESOPHAGOSCOPY, GASTROSCOPY, DUODENOSCOPY (EGD), COMBINED N/A 2022    Procedure: ESOPHAGOGASTRODUODENOSCOPY (EGD);  Surgeon: Travis Briseno MD;  Location:  GI    ESOPHAGOSCOPY, GASTROSCOPY, DUODENOSCOPY (EGD), COMBINED N/A 2023    Procedure: ESOPHAGOGASTRODUODENOSCOPY, WITH BIOPSY;  Surgeon: Chao Rodrigues DO;  Location:  GI    ORTHOPEDIC SURGERY      PHACOEMULSIFICATION CLEAR CORNEA WITH STANDARD INTRAOCULAR LENS IMPLANT Left 2023    Procedure: LEFT EYE PHACOEMULSIFICATION, CATARACT, WITH INTRAOCULAR LENS IMPLANT;  Surgeon: Re Keita MD;  Location: [facility] OR    PHACOEMULSIFICATION CLEAR CORNEA WITH STANDARD INTRAOCULAR LENS IMPLANT Right 2023    Procedure: RIGHT EYE PHACOEMULSIFICATION, CATARACT, WITH INTRAOCULAR LENS IMPLANT;  Surgeon: Re Keita MD;  Location: [facility] OR       Social History     Socioeconomic History    Marital status: Single     Spouse name: Not on file    Number of children: Not on file    Years of education: Not on file    Highest education level: Not on file   Occupational History    Not on file   Tobacco Use    Smoking status: Former     Current packs/day: 0.00     Types: Cigarettes     Quit date:      Years since quittin.3     Passive exposure: Past    Smokeless tobacco: Never    Tobacco comments:     2023 Patient using 14 mg patch, wants to have prescription for Nicotrol inhaler, took workbook   Substance and Sexual Activity    Alcohol use: Not Currently    Drug use: No    Sexual activity: Not Currently     Partners: Male   Other Topics Concern    Not on file   Social  History Narrative    Ms. Aparicio lives in an apartment complex by herself as of May 2022. She has frequent smoke exposure from neighbors even when she is not smoking herself.      Social Determinants of Health     Financial Resource Strain: Not on file   Food Insecurity: Not on file   Transportation Needs: Not on file   Physical Activity: Not on file   Stress: Not on file   Social Connections: Not on file   Interpersonal Safety: Low Risk  (4/9/2024)    Interpersonal Safety     Do you feel physically and emotionally safe where you currently live?: Yes     Within the past 12 months, have you been hit, slapped, kicked or otherwise physically hurt by someone?: No     Within the past 12 months, have you been humiliated or emotionally abused in other ways by your partner or ex-partner?: No   Housing Stability: Not on file       Family History   Problem Relation Age of Onset    Thyroid Disease Mother     Cerebrovascular Disease Mother     Hypertension Mother     Hypertension Father     Glaucoma Father     Cancer Sister     Lung Cancer Sister     Diabetes Brother     Cancer Brother     Diabetes Brother     Cancer Brother     Diabetes Brother     Deep Vein Thrombosis (DVT) Daughter     Depression Daughter     Alcohol/Drug Other         self    Diabetes Other         self    Thyroid Disease Other         self    Asthma Other         self    Macular Degeneration No family hx of     Anesthesia Reaction No family hx of      Family history reviewed and edited as appropriate    Medications and Allergies:     Outpatient Encounter Medications as of 4/29/2024   Medication Sig Dispense Refill    acetylcysteine (MUCOMYST) 10 % nebulizer solution Inhale 4 mLs into the lungs 4 times daily as needed for mucolysis/respiratory distress 30 mL 5    albuterol (PROAIR HFA/PROVENTIL HFA/VENTOLIN HFA) 108 (90 Base) MCG/ACT inhaler USE 1 OR 2 INHALATIONS EVERY 4 HOURS AS NEEDED 17 g 2    albuterol (PROVENTIL) (2.5 MG/3ML) 0.083% neb solution Take 1  vial (2.5 mg) by nebulization 4 times daily 360 mL 0    Alpha-Lipoic Acid 300 MG TABS Take 300 mg by mouth 2 times daily 100 tablet 11    alpha-lipoic acid 600 MG capsule Take 1 capsule (600 mg) by mouth daily 90 capsule 3    aspirin 81 MG EC tablet Take 1 tablet (81 mg) by mouth every morning 90 tablet 3    atorvastatin (LIPITOR) 10 MG tablet Take 1 tablet (10 mg) by mouth daily 90 tablet 3    blood glucose (ONETOUCH ULTRA) test strip       capsaicin (ZOSTRIX) 0.025 % external cream Apply a thin amount topically 3 times daily as needed (neuropathy in feet.) 25 g 11    carboxymethylcellulose (REFRESH LIQUIGEL) 1 % ophthalmic solution Apply 1 drop to eye 4 times daily 15 mL 4    cetirizine (ZYRTEC) 10 MG tablet Take 1 tablet (10 mg) by mouth every morning 90 tablet 3    Continuous Blood Gluc  (DEXCOM G7 ) JOSEPHINE Use to read blood sugars as per 's instructions. 1 each 0    Continuous Blood Gluc Sensor (DEXCOM G7 SENSOR) MISC Change every 10 days. 3 each 5    Continuous Blood Gluc Sensor (FREESTYLE GIOVANNI 2 SENSOR) MISC 1 each every 14 days For use with Freestyle Giovanni 2  for continuous monitioring of blood glucose levels. Replace sensor every 14 days. 2 each 11    cyanocobalamin (VITAMIN B-12) 500 MCG tablet Take 1 tablet (500 mcg) by mouth daily (Patient taking differently: Take 500 mcg by mouth every morning) 90 tablet 1    cyclobenzaprine (FLEXERIL) 5 MG tablet Take 1 tablet (5 mg) by mouth 3 times daily as needed for muscle spasms 21 tablet 0    DULoxetine (CYMBALTA) 30 MG capsule Take 1 capsule by mouth for one week and then increase to 2 capsules daily if tolerated. 60 capsule 1    empagliflozin (JARDIANCE) 25 MG TABS tablet Take 1 tablet (25 mg) by mouth every morning 90 tablet 0    EPINEPHrine (ANY BX GENERIC EQUIV) 0.3 MG/0.3ML injection 2-pack Inject 0.3 mLs (0.3 mg) into the muscle as needed for anaphylaxis (related to bee stings) May repeat one time in 5-15 minutes if  response to initial dose is inadequate. 2 each 1    fluticasone (FLONASE) 50 MCG/ACT nasal spray Spray 1 spray into both nostrils 2 times daily Use at night before bed 15.8 mL 3    Fluticasone-Umeclidin-Vilanterol (TRELEGY ELLIPTA) 200-62.5-25 MCG/ACT oral inhaler USE 1 INHALATION DAILY 28 each 5    glipiZIDE (GLUCOTROL) 5 MG tablet Take 5 mg by mouth      GLUCOSAMINE-CHONDROITIN -400 MG tablet TAKE 1 TABLET DAILY (Patient taking differently: Take 1 tablet by mouth every evening) 90 tablet 3    ipratropium - albuterol 0.5 mg/2.5 mg/3 mL (DUONEB) 0.5-2.5 (3) MG/3ML neb solution Take 1 vial (3 mLs) by nebulization every 6 hours as needed for shortness of breath, wheezing or cough 90 mL 1    losartan (COZAAR) 50 MG tablet Take 1 tablet (50 mg) by mouth daily 90 tablet 3    olopatadine (PATADAY) 0.2 % ophthalmic solution Place 0.05 mLs (1 drop) into both eyes daily 2.5 mL 11    omega-3 acid ethyl esters (LOVAZA) 1 g capsule Take 2 capsules (2 g) by mouth 2 times daily (Patient taking differently: Take 1 g by mouth 2 times daily Take 2 capsules (2 g) by mouth 2 times daily) 360 capsule 3    omeprazole (PRILOSEC) 20 MG DR capsule Take 20 mg by mouth      polyethylene glycol-propylene glycol (SYSTANE) 0.4-0.3 % SOLN ophthalmic solution INSTILL 1 DROP IN BOTH EYES FOUR TIMES DAILY      pregabalin (LYRICA) 150 MG capsule TAKE 1 CAPSULE(150 MG) BY MOUTH TWICE DAILY (Patient taking differently: Take 150 mg by mouth 2 times daily TAKE 1 CAPSULE(150 MG) BY MOUTH TWICE DAILY) 270 capsule 1    roflumilast (DALIRESP) 500 MCG TABS tablet Take 1 tablet (500 mcg) by mouth every morning 90 tablet 3    STATIN NOT PRESCRIBED (INTENTIONAL) Please choose reason not prescribed from choices below.      tirzepatide (MOUNJARO) 2.5 MG/0.5ML pen Inject 2.5 mg Subcutaneous every 7 days 2 mL 0     No facility-administered encounter medications on file as of 4/29/2024.        Allergies   Allergen Reactions    Aspirin      325mg     Bee Venom  "Anaphylaxis    Penicillins Hives    Azithromycin Dizziness    Colon Care     Interferons Dermatitis        Review of systems:  A full 10 point review of systems was obtained and was negative except for the pertinent positives and negatives stated within the HPI.    Objective Findings:   Physical Exam:    Constitutional: /75   Pulse 98   Ht 1.676 m (5' 6\")   Wt 93.7 kg (206 lb 8 oz)   SpO2 95%   BMI 33.33 kg/m    General: Alert, cooperative, no distress, well-appearing. Uses walker.   Head: Atraumatic, normocephalic, no obvious abnormalities   Eyes: Sclera anicteric, no obvious conjunctival hemorrhage   Nose: Nares normal, no obvious malformation, no obvious rhinorrhea   Respiratory: Resting comfortably, no apparent distress, no cough.   Gastrointestinal: Soft, non distended  Skin: No jaundice, no obvious rash  Neurologic: AAOx3, no obvious neurologic abnormality  Psychiatric: Normal Affect, appropriate mood  Extremities: No obvious edema, no obvious malformation     Labs, Radiology, Pathology     Lab Results   Component Value Date    WBC 6.9 01/18/2024    WBC 11.5 (H) 11/27/2023    WBC 6.4 10/26/2023    HGB 13.9 01/18/2024    HGB 14.2 11/27/2023    HGB 13.4 10/26/2023     01/18/2024     11/27/2023     10/26/2023    CHOL 166 11/27/2023    CHOL 220 (H) 10/26/2023    CHOL 251 (H) 01/27/2022    TRIG 64 11/27/2023    TRIG 96 10/26/2023    TRIG 127 01/27/2022    HDL 76 11/27/2023    HDL 55 10/26/2023    HDL 58 01/27/2022    ALT 9 01/18/2024    ALT 8 11/27/2023    ALT 10 10/26/2023    AST 17 01/18/2024    AST 17 11/27/2023    AST 18 10/26/2023     03/07/2024     01/18/2024     11/27/2023    BUN 5.2 (L) 03/07/2024    BUN 5.7 (L) 01/18/2024    BUN 12.1 11/27/2023    CO2 29 03/07/2024    CO2 30 (H) 01/18/2024    CO2 31 (H) 11/27/2023    TSH 3.79 10/26/2023    TSH 2.65 08/31/2022    TSH 1.50 02/07/2008    INR 1.02 02/03/2023    INR 1.02 02/23/2021    INR 0.97 04/30/2008    "     Liver Function Studies -   Recent Labs   Lab Test 05/25/23  0909   PROTTOTAL 7.0   ALBUMIN 4.0   BILITOTAL 0.3   ALKPHOS 89   AST 14   ALT 9*          Patient Active Problem List    Diagnosis Date Noted    Class 2 severe obesity due to excess calories with serious comorbidity in adult (H) 10/27/2023     Priority: Medium    Combined forms of age-related cataract of both eyes 05/31/2023     Priority: Medium     Added automatically from request for surgery [id number]      Acute and chronic respiratory failure with hypoxia (H) 01/01/2023     Priority: Medium    Gastroesophageal reflux disease without esophagitis 05/24/2022     Priority: Medium    Esophageal dysphagia 05/24/2022     Priority: Medium    Shortness of breath 05/21/2022     Priority: Medium    Chest pain, unspecified type 05/21/2022     Priority: Medium    Hypokalemia 05/21/2022     Priority: Medium    Hypoxia 05/20/2022     Priority: Medium    Diabetic neuropathy (H) 11/18/2021     Priority: Medium    Malnutrition (H24) 06/25/2021     Priority: Medium    Chronic obstructive pulmonary disease, unspecified COPD type (H) 02/23/2021     Priority: Medium    Primary lung adenocarcinoma (H) STAGE: IA2 [id number] poorly diff adeno RLL, then [id number] IA2 suspected NSCLC RUL, medically inoperable  12/15/2020     Priority: Medium    Pulmonary emphysema (H) 09/22/2020     Priority: Medium    Type 2 diabetes mellitus without complication, without long-term current use of insulin (H) 01/09/2019     Priority: Medium    Cervical stenosis (uterine cervix) 03/18/2011     Priority: Medium    Vaginitis 03/14/2011     Priority: Medium    Postmenopausal bleeding 03/14/2011     Priority: Medium      Assessment and Plan   Assessment/Plan:    Jenn Aparicio is a 68 year old female with significant past medical history pertinent for serve COPD on home O2 (3-4L), RLL adenocarcinoma of the lung s/p lobectomy (2/2016), RUL NSCLC s/p SBRT (1/2020) acute on chronic hypoxic respiratory  failure, DM type II complicated by diabetic neuropathy, who is presenting as a follow up patient with a chief complaint of reflux and dysphagia.    #Chronic Dysphagia - Resolved  #GERD    #Hiatal Hernia 2-3 cm     Prior Evaluation Includes:    5/20/2022 esophagram with small hiatal hernia with spontaneous reflux to the level of the clavicle and tertiary contractions     6/28/2023 normal high-resolution manometry study    8/8/2023 EGD with dilation of the cricopharyngeus to 16 mm with a TTS balloon.  Findings also notable for 2 cm hiatal hernia.  There was mild patchy erythematous mucosa within the gastric body/antrum.  Biopsies with mild chronic inactive gastritis. Negative for H. pylori and intestinal metaplasia.    Today Jenn again reports that symptoms of dysphagia have been stable since EGD with dilation 8/8/2023. With solid and pill dysphagia occurring at most once or twice per month. Symptoms may be consistent with oropharyngeal etiology as patient does have poor dentition.  Additional findings were pertinent for pooling within the posterior oropharynx/hypopharynx and in the vallecula on upper endoscopy. With these concerns/findings formal video swallow study will be performed. In regards to Jenn's reflux symptoms this is well-controlled with her current twice daily acid suppressive regimen however unknown to which medication she is taking at which dose. Again today emphasis was placed on weight loss and the impacts this can have on both the regression of a small hiatal hernia and benefits towards reflux symptoms.     - Reflux lifestyle modifications as directed within the AVS  - Obtain a wedge pillow and sleep with head end of the best elevated   - Do not eat or drink for at least 4 hours prior to laying down for bed  - Message to providers to clarify medications/acid suppressive medication   - Formal videos swallow study. To schedule imaging, please call [phone]   - Follow up with your dentist  regarding poor dietician and to consider dentures.   - If imaging is unrevealing and swallowing concerns increase would then consider upper endoscopy with repeat dilation   - Colonoscopy for colon cancer screening scheduled for 9/5/2024    #Colorectal Cancer Screening   Colonoscopy 2018 with >3 adenomatous polyps. Recall 3 years.  No family history of CRC. Per the most recent update by the [place] Multi-Society Task Force on Colorectal Cancer recall should be 3 years, 2021 or sooner if otherwise indicated.     - Colonoscopy for colon cancer screening scheduled for 9/5/2024    Follow up plan:   Return to clinic 4 months and as needed.    The risks and benefits of my recommendations, as well as other treatment options were discussed with the patient and any available family today. All questions were answered.     Follow up: As planned above. Today, I personally spent 22 minutes in direct face to face time with the patient, of which greater than 50% of the time was spent in patient education and counseling as described above. Approximately 8 minutes were spent on indirect care associated with the patient's consultation including but not limited to review of: patient medical records to date, clinic visits, hospital records, lab results, imaging studies, procedural documentation, and coordinating care with other providers. The findings from this review are summarized in the above note. All of the above accounted for a cumulative time of 30 minutes and was performed on the date of service.     The patient verbalized understanding of the plan and was appreciative for the time spent and information provided during the office visit.       Documentation assisted by voice recognition and documentation system.        Again, thank you for allowing me to participate in the care of your patient.      Sincerely,    Gabriela Damon PA-C

## 2024-05-02 DIAGNOSIS — E11.42 DIABETIC POLYNEUROPATHY ASSOCIATED WITH TYPE 2 DIABETES MELLITUS (H): ICD-10-CM

## 2024-05-03 ENCOUNTER — MYC REFILL (OUTPATIENT)
Dept: INTERNAL MEDICINE | Facility: CLINIC | Age: 69
End: 2024-05-03
Payer: COMMERCIAL

## 2024-05-03 DIAGNOSIS — E11.42 DIABETIC PERIPHERAL NEUROPATHY (H): ICD-10-CM

## 2024-05-03 DIAGNOSIS — E78.5 HYPERLIPIDEMIA, UNSPECIFIED HYPERLIPIDEMIA TYPE: ICD-10-CM

## 2024-05-03 DIAGNOSIS — E11.65 TYPE 2 DIABETES MELLITUS WITH HYPERGLYCEMIA, WITHOUT LONG-TERM CURRENT USE OF INSULIN (H): ICD-10-CM

## 2024-05-03 DIAGNOSIS — J30.2 SEASONAL ALLERGIC RHINITIS, UNSPECIFIED TRIGGER: ICD-10-CM

## 2024-05-03 DIAGNOSIS — H04.123 DRY EYES: Primary | ICD-10-CM

## 2024-05-03 DIAGNOSIS — E11.42 DIABETIC POLYNEUROPATHY ASSOCIATED WITH TYPE 2 DIABETES MELLITUS (H): ICD-10-CM

## 2024-05-03 RX ORDER — ATORVASTATIN CALCIUM 10 MG/1
10 TABLET, FILM COATED ORAL DAILY
Qty: 90 TABLET | Refills: 3 | Status: CANCELLED | OUTPATIENT
Start: 2024-05-03

## 2024-05-03 RX ORDER — CETIRIZINE HYDROCHLORIDE 10 MG/1
10 TABLET ORAL EVERY MORNING
Qty: 90 TABLET | Refills: 3 | Status: CANCELLED | OUTPATIENT
Start: 2024-05-03

## 2024-05-05 NOTE — TELEPHONE ENCOUNTER
polyethylene glycol-propylene glycol (SYSTANE) 0.4-0.3 % SOLN ophthalmic solution   Historical    pregabalin (LYRICA) 150 MG capsule   Disp-270 capsule, R-1,   Start: 10/05/2023     atorvastatin (LIPITOR) 10 MG tablet   Disp-90 tablet, R-3,   Start: 02/08/2024   Rfs on file    cetirizine (ZYRTEC) 10 MG tablet   Disp-90 tablet, R-3   Start: 04/09/2024   Rfs on file    DULoxetine (CYMBALTA) 30 MG capsule   Disp-60 capsule, R-1   Start: 02/08/2024       Routed because:  my chart request.  Systane historical  Lyrica controlled, CYMBALTA ,last fill 60,1 rf.  #qty rf?

## 2024-05-06 RX ORDER — DULOXETIN HYDROCHLORIDE 30 MG/1
CAPSULE, DELAYED RELEASE ORAL
Qty: 60 CAPSULE | Refills: 1 | Status: SHIPPED | OUTPATIENT
Start: 2024-05-06 | End: 2024-07-09

## 2024-05-06 RX ORDER — POLYETHYLENE GLYCOL 400 AND PROPYLENE GLYCOL 4; 3 MG/ML; MG/ML
1 SOLUTION/ DROPS OPHTHALMIC 4 TIMES DAILY
Qty: 5 ML | Refills: 1 | Status: SHIPPED | OUTPATIENT
Start: 2024-05-06 | End: 2024-07-09

## 2024-05-06 RX ORDER — PREGABALIN 150 MG/1
150 CAPSULE ORAL 2 TIMES DAILY
Qty: 180 CAPSULE | Refills: 0 | Status: SHIPPED | OUTPATIENT
Start: 2024-05-06 | End: 2024-07-09

## 2024-05-09 RX ORDER — DULOXETIN HYDROCHLORIDE 30 MG/1
CAPSULE, DELAYED RELEASE ORAL
Qty: 60 CAPSULE | Refills: 1 | OUTPATIENT
Start: 2024-05-09

## 2024-05-10 ENCOUNTER — VIRTUAL VISIT (OUTPATIENT)
Dept: PHARMACY | Facility: CLINIC | Age: 69
End: 2024-05-10
Payer: COMMERCIAL

## 2024-05-10 DIAGNOSIS — E11.9 TYPE 2 DIABETES MELLITUS WITHOUT COMPLICATION, WITHOUT LONG-TERM CURRENT USE OF INSULIN (H): Primary | ICD-10-CM

## 2024-05-10 DIAGNOSIS — R52 PAIN: ICD-10-CM

## 2024-05-10 PROCEDURE — 99607 MTMS BY PHARM ADDL 15 MIN: CPT | Mod: 93 | Performed by: PHARMACIST

## 2024-05-10 PROCEDURE — 99606 MTMS BY PHARM EST 15 MIN: CPT | Mod: 93 | Performed by: PHARMACIST

## 2024-05-10 RX ORDER — TIRZEPATIDE 5 MG/.5ML
5 INJECTION, SOLUTION SUBCUTANEOUS
Qty: 2 ML | Refills: 0 | Status: SHIPPED | OUTPATIENT
Start: 2024-05-10 | End: 2024-06-26

## 2024-05-10 RX ORDER — GLIPIZIDE 5 MG/1
TABLET ORAL
Qty: 60 TABLET | Refills: 1 | Status: SHIPPED | OUTPATIENT
Start: 2024-05-10 | End: 2024-06-26

## 2024-05-10 NOTE — PATIENT INSTRUCTIONS
"Recommendations from today's MTM visit:                                                    MTM (medication therapy management) is a service provided by a clinical pharmacist designed to help you get the most of out of your medicines.   Today we reviewed what your medicines are for, how to know if they are working, that your medicines are safe and how to make your medicine regimen as easy as possible.    Restart glipizide 5 mg twice a day  Okay to use ibuprofen sparingly per over the counter labeling.      Follow-up: Return in about 4 weeks (around 6/7/2024) for Follow up, with me, using a phone visit.    It was great speaking with you today.  I value your experience and would be very thankful for your time in providing feedback in our clinic survey. In the next few days, you may receive an email or text message from [employer] with a link to a survey related to your  clinical pharmacist.\"     To schedule another MTM appointment, please call the clinic directly or you may call the MTM scheduling line at [phone] or toll-free at [phone].     My Clinical Pharmacist's contact information:                                                      Please feel free to contact me with any questions or concerns you have.      Samy Prasad, Pharm. D., [facility]  Medication Therapy Management Pharmacist    "

## 2024-05-10 NOTE — PROGRESS NOTES
Medication Therapy Management (MTM) Encounter    ASSESSMENT:                            Medication Adherence/Access: No issues identified    Diabetes:   Patient is not meeting A1c goal of < 7%.  Self monitoring of blood glucose is not at goal of fasting  mg/dL.    Pain:   Since her blood pressure is controlled and she does not have heart disease or kidney disease she can use ibuprofen sparingly for her pain.    PLAN:                            Restart glipizide 5 mg twice a day  Okay to use ibuprofen sparingly per over the counter labeling.      Follow-up: Return in about 4 weeks (around 6/7/2024) for Follow up, with me, using a phone visit.      SUBJECTIVE/OBJECTIVE:                          Jenn Aparicio is a 68 year old female called for a follow-up visit.       Reason for visit: diabetes follow-up .    Allergies/ADRs: Reviewed in chart  Past Medical History: Reviewed in chart  Tobacco: She reports that she quit smoking about 16 months ago. Her smoking use included cigarettes. She has been exposed to tobacco smoke. She has never used smokeless tobacco.  Alcohol: none    Medication Adherence/Access: no issues reported    Diabetes   Jardiance 25 mg daily  Mounjaro 2.5 mg once weekly - has been on for 2 weeks and no side effects    Metformin, developed diarrhea, has tried both extended release and short acting formulations      Blood sugar monitoring: CGM. Ranges (patient reported):   Yesterday went as high as 300 mg/dL. Reports her fasting this morning is in the 220's mg/dL    Reports she has been lightheaded    Eye exam: due  Foot exam: due    Statin: atorvastatin               Eye exam is up to date  Foot exam is up to date  Urine Albumin:   Lab Results   Component Value Date    UMALCR 271.48 (H) 08/23/2023      Lab Results   Component Value Date    A1C 8.1 (H) 03/07/2024           Pain:   Reports she has been having neck issues for a week. Wondering if she can take some ibuprofen.   Hasn't received  cyclobenzaprine    Today's Vitals: There were no vitals taken for this visit.  ----------------      I spent 9 minutes with this patient today. All changes were made via collaborative practice agreement with BENOIT Ruiz CNP. A copy of the visit note was provided to the patient's provider(s).    A summary of these recommendations was sent via [employer].    Samy Prasad, Pharm. D., [facility]  Medication Therapy Management Pharmacist      Telemedicine Visit Details  Type of service:  Telephone visit  Start Time:  902 am  End Time:  911 am     Medication Therapy Recommendations  Diabetic neuropathy (H)    Current Medication: pregabalin (LYRICA) 150 MG capsule (Discontinued)   Rationale: Dose too low - Dosage too low - Effectiveness   Recommendation: Increase Dose   Status: No Longer Relevant         Type 2 diabetes mellitus without complication, without long-term current use of insulin (H)    Current Medication: tirzepatide (MOUNJARO) 2.5 MG/0.5ML pen   Rationale: Dose too low - Dosage too low - Effectiveness   Recommendation: Increase Dose   Status: Accepted per CPA          Current Medication: tirzepatide (MOUNJARO) 5 MG/0.5ML pen   Rationale: Synergistic therapy - Needs additional medication therapy - Indication   Recommendation: Start Medication - GLIPIZIDE   Status: Accepted per CPA

## 2024-05-14 DIAGNOSIS — E11.9 TYPE 2 DIABETES MELLITUS WITHOUT COMPLICATION, WITHOUT LONG-TERM CURRENT USE OF INSULIN (H): ICD-10-CM

## 2024-05-14 RX ORDER — TIRZEPATIDE 2.5 MG/.5ML
INJECTION, SOLUTION SUBCUTANEOUS
Qty: 2 ML | Refills: 0 | OUTPATIENT
Start: 2024-05-14

## 2024-05-15 ENCOUNTER — LAB (OUTPATIENT)
Dept: LAB | Facility: CLINIC | Age: 69
End: 2024-05-15
Payer: COMMERCIAL

## 2024-05-15 ENCOUNTER — ANCILLARY PROCEDURE (OUTPATIENT)
Dept: CT IMAGING | Facility: CLINIC | Age: 69
End: 2024-05-15
Attending: INTERNAL MEDICINE
Payer: COMMERCIAL

## 2024-05-15 DIAGNOSIS — C34.31 MALIGNANT NEOPLASM OF LOWER LOBE OF RIGHT LUNG (H): Primary | ICD-10-CM

## 2024-05-15 DIAGNOSIS — C34.31 MALIGNANT NEOPLASM OF LOWER LOBE OF RIGHT LUNG (H): ICD-10-CM

## 2024-05-15 LAB
ALBUMIN SERPL BCG-MCNC: 4 G/DL (ref 3.5–5.2)
ALP SERPL-CCNC: 92 U/L (ref 40–150)
ALT SERPL W P-5'-P-CCNC: 12 U/L (ref 0–50)
ANION GAP SERPL CALCULATED.3IONS-SCNC: 9 MMOL/L (ref 7–15)
AST SERPL W P-5'-P-CCNC: 19 U/L (ref 0–45)
BASOPHILS # BLD AUTO: 0.1 10E3/UL (ref 0–0.2)
BASOPHILS NFR BLD AUTO: 1 %
BILIRUB SERPL-MCNC: 0.3 MG/DL
BUN SERPL-MCNC: 6.8 MG/DL (ref 8–23)
CALCIUM SERPL-MCNC: 8.9 MG/DL (ref 8.8–10.2)
CHLORIDE SERPL-SCNC: 106 MMOL/L (ref 98–107)
CREAT BLD-MCNC: 0.6 MG/DL (ref 0.5–1)
CREAT SERPL-MCNC: 0.56 MG/DL (ref 0.51–0.95)
DEPRECATED HCO3 PLAS-SCNC: 29 MMOL/L (ref 22–29)
EGFRCR SERPLBLD CKD-EPI 2021: >60 ML/MIN/1.73M2
EGFRCR SERPLBLD CKD-EPI 2021: >90 ML/MIN/1.73M2
EOSINOPHIL # BLD AUTO: 1 10E3/UL (ref 0–0.7)
EOSINOPHIL NFR BLD AUTO: 11 %
ERYTHROCYTE [DISTWIDTH] IN BLOOD BY AUTOMATED COUNT: 13.5 % (ref 10–15)
GLUCOSE SERPL-MCNC: 123 MG/DL (ref 70–99)
HCT VFR BLD AUTO: 42.2 % (ref 35–47)
HGB BLD-MCNC: 13.2 G/DL (ref 11.7–15.7)
IMM GRANULOCYTES # BLD: 0 10E3/UL
IMM GRANULOCYTES NFR BLD: 0 %
LYMPHOCYTES # BLD AUTO: 1.8 10E3/UL (ref 0.8–5.3)
LYMPHOCYTES NFR BLD AUTO: 20 %
MCH RBC QN AUTO: 27.7 PG (ref 26.5–33)
MCHC RBC AUTO-ENTMCNC: 31.3 G/DL (ref 31.5–36.5)
MCV RBC AUTO: 89 FL (ref 78–100)
MONOCYTES # BLD AUTO: 0.6 10E3/UL (ref 0–1.3)
MONOCYTES NFR BLD AUTO: 7 %
NEUTROPHILS # BLD AUTO: 5.3 10E3/UL (ref 1.6–8.3)
NEUTROPHILS NFR BLD AUTO: 61 %
NRBC # BLD AUTO: 0 10E3/UL
NRBC BLD AUTO-RTO: 0 /100
PLATELET # BLD AUTO: 196 10E3/UL (ref 150–450)
POTASSIUM SERPL-SCNC: 4.1 MMOL/L (ref 3.4–5.3)
PROT SERPL-MCNC: 6.7 G/DL (ref 6.4–8.3)
RBC # BLD AUTO: 4.77 10E6/UL (ref 3.8–5.2)
SODIUM SERPL-SCNC: 144 MMOL/L (ref 135–145)
WBC # BLD AUTO: 8.7 10E3/UL (ref 4–11)

## 2024-05-15 PROCEDURE — 71260 CT THORAX DX C+: CPT | Performed by: RADIOLOGY

## 2024-05-15 PROCEDURE — 74177 CT ABD & PELVIS W/CONTRAST: CPT | Performed by: RADIOLOGY

## 2024-05-15 PROCEDURE — 80053 COMPREHEN METABOLIC PANEL: CPT | Performed by: PATHOLOGY

## 2024-05-15 PROCEDURE — 36415 COLL VENOUS BLD VENIPUNCTURE: CPT | Performed by: PATHOLOGY

## 2024-05-15 PROCEDURE — 85025 COMPLETE CBC W/AUTO DIFF WBC: CPT | Performed by: PATHOLOGY

## 2024-05-15 PROCEDURE — 82565 ASSAY OF CREATININE: CPT | Mod: 91 | Performed by: PATHOLOGY

## 2024-05-15 RX ORDER — IOPAMIDOL 755 MG/ML
101 INJECTION, SOLUTION INTRAVASCULAR ONCE
Status: COMPLETED | OUTPATIENT
Start: 2024-05-15 | End: 2024-05-15

## 2024-05-15 RX ADMIN — IOPAMIDOL 101 ML: 755 INJECTION, SOLUTION INTRAVASCULAR at 09:55

## 2024-05-15 NOTE — DISCHARGE INSTRUCTIONS

## 2024-05-16 ENCOUNTER — ONCOLOGY VISIT (OUTPATIENT)
Dept: ONCOLOGY | Facility: CLINIC | Age: 69
End: 2024-05-16
Attending: INTERNAL MEDICINE
Payer: COMMERCIAL

## 2024-05-16 VITALS
OXYGEN SATURATION: 98 % | HEART RATE: 90 BPM | DIASTOLIC BLOOD PRESSURE: 76 MMHG | TEMPERATURE: 98 F | SYSTOLIC BLOOD PRESSURE: 132 MMHG | RESPIRATION RATE: 17 BRPM

## 2024-05-16 DIAGNOSIS — C34.31 MALIGNANT NEOPLASM OF LOWER LOBE OF RIGHT LUNG (H): ICD-10-CM

## 2024-05-16 DIAGNOSIS — M62.838 MUSCLE SPASM: Primary | ICD-10-CM

## 2024-05-16 DIAGNOSIS — M89.9 BONE LESION: ICD-10-CM

## 2024-05-16 DIAGNOSIS — R00.2 PALPITATIONS: ICD-10-CM

## 2024-05-16 LAB
ATRIAL RATE - MUSE: 83 BPM
DIASTOLIC BLOOD PRESSURE - MUSE: NORMAL MMHG
INTERPRETATION ECG - MUSE: NORMAL
P AXIS - MUSE: 79 DEGREES
PR INTERVAL - MUSE: 162 MS
QRS DURATION - MUSE: 76 MS
QT - MUSE: 376 MS
QTC - MUSE: 441 MS
R AXIS - MUSE: 57 DEGREES
SYSTOLIC BLOOD PRESSURE - MUSE: NORMAL MMHG
T AXIS - MUSE: 71 DEGREES
VENTRICULAR RATE- MUSE: 83 BPM

## 2024-05-16 PROCEDURE — 99215 OFFICE O/P EST HI 40 MIN: CPT | Performed by: INTERNAL MEDICINE

## 2024-05-16 PROCEDURE — 93005 ELECTROCARDIOGRAM TRACING: CPT

## 2024-05-16 PROCEDURE — G0463 HOSPITAL OUTPT CLINIC VISIT: HCPCS | Performed by: INTERNAL MEDICINE

## 2024-05-16 PROCEDURE — G2211 COMPLEX E/M VISIT ADD ON: HCPCS | Performed by: INTERNAL MEDICINE

## 2024-05-16 PROCEDURE — 93010 ELECTROCARDIOGRAM REPORT: CPT | Performed by: INTERNAL MEDICINE

## 2024-05-16 RX ORDER — CYCLOBENZAPRINE HCL 5 MG
5 TABLET ORAL 3 TIMES DAILY PRN
Qty: 30 TABLET | Refills: 1 | Status: SHIPPED | OUTPATIENT
Start: 2024-05-16 | End: 2024-10-02

## 2024-05-16 RX ORDER — CYCLOBENZAPRINE HCL 5 MG
5 TABLET ORAL 3 TIMES DAILY PRN
Qty: 90 TABLET | Refills: 3 | Status: CANCELLED | OUTPATIENT
Start: 2024-05-16

## 2024-05-16 ASSESSMENT — PAIN SCALES - GENERAL: PAINLEVEL: WORST PAIN (10)

## 2024-05-16 NOTE — NURSING NOTE
"Oncology Rooming Note    May 16, 2024 12:46 PM   Jenn Aparicio is a 68 year old female who presents for:    Chief Complaint   Patient presents with    Oncology Clinic Visit     Primary lung adenocarcinoma     Initial Vitals: /76   Pulse 90   Temp 98  F (36.7  C)   Resp 17   SpO2 98%  Estimated body mass index is 33.33 kg/m  as calculated from the following:    Height as of 4/29/24: 1.676 m (5' 6\").    Weight as of 4/29/24: 93.7 kg (206 lb 8 oz). There is no height or weight on file to calculate BSA.  Worst Pain (10) Comment: Data Unavailable   No LMP recorded. Patient is postmenopausal.  Allergies reviewed: Yes  Medications reviewed: Yes    Medications: Medication refills not needed today.  Pharmacy name entered into EPIC:    [facility] - [place], [place] - [facility]  [email or web address] - [facility], [place] - [address] [place] [address] [id number] - [place], [place] - [address] [facility] [facility] [place], [place] - [address] [facility] [phone]    Frailty Screening:   Is the patient here for a new oncology consult visit in cancer care? 2. No      Clinical concerns: Pt reports itching and scaling.  Dr. Leung was notified.      Olivia Ponce"

## 2024-05-16 NOTE — LETTER
5/16/2024         RE: Jenn Aparicio  [address]  [place] [address]        Dear Colleague,    Thank you for referring your patient, Jenn Aparicio, to the [facility]. Please see a copy of my visit note below.       [facility]    PATIENT NAME: Jenn Aparicio  MRN # [id number]   DATE OF VISIT: May 16, 2024  YOB: 1955     CANCER TYPE: Lung adenocarcinoma  STAGE: IA2 [id number] poorly diff adeno RLL, then [id number] IA2 suspected NSCLC RUL, medically inoperable      ECOG PS: 2 (COPD)     PD-L1: N/A  Lung panel: N/A  NGS: N/A     SUMMARY  12/24/15 PET/CT  1/13/15 CT lung bx. Adenocarcinoma  2/8/16 R thoracotomy, RLL lobectomy (Dr. Cunha). Adenocarcinoma, 1.1 cm, assoicated with atypical adenomatous hyperplasia  10/18/19 CTA for shortness of breath. New 1.3 cm RUL nodule  11/2019 PET/CT. 1.1 cm RUL hypermetabolic nodule (SUV 12.6)  12/26/19 Brain MRI negative for mets  1/14~1/28/20 SBRT to RUL nodule, 5000 cGy, every other day, 1000 cGy (Dr. Currie at [facility] [place]). No bx due to O2 dependence and too much risk  8/19/20 First visit with me   11/29/21 CT chest. New 10 mm RUL nodule  1/11/22 CT chest. New 7.2 mm RUL nodule, unchanged RUL nodules  3/31/22 CT chest. New RUL nodule from Jan 2022 7-->10 mm, new 5 mm ROBERT nodule  5/20~5/24/22 [facility] for COPD exacerbation.   6/24/22 CT chest non contrast.   8/16/22 EGD. 3 cm hiatal hernia, o/w normal  8/31/22 CT chest. 2.5 x 1.3 cm R midlung subpleural nodularity (4:98) previously 2.3 x 1.1 cm, slightly increased solid component   10/5/22 PET/CT. 2 x 1.3 cm irregular opacity posterior RUL (SUV 2.46), 2.4 x 0.8 cm linear opacity (SUV 4.76); there was bronchiectasia on prior imaging. Stable 1.1 x 0.8 cm non FDG avid RUL opacity and unchanged 4 mm posterior RUL nodule. No adenopathy. Inferior right breast uptake (SUV 6.09) likely infectious or inflammatory.   2/3~2/9/23 [facility] for COPD exacerbation  2/4/23 CTA during hospitalization.  No significant change in 2.3 x 1.5 cm spiculated nodule in the right upper lobe (series 7 image 94) with surrounding linear parenchymal opacities and subtle nodularity  2/20~2/24/23 [facility] for COPD exacerbation.   3/1/23 CT chest non contrast. 2.3 cm posterior RUL irregular nodule unchanged from 12/1/22 however slightly increased soft tissue component compared to 10/5/22 PET. Stable ROBERT mucous plugging with micronodularity.  3/22/23 CT chest. Stable compared to 3/1/23  5/25/23 CT chest abd w/contrast. Unchanged 1.3 x 0.5 cm R apical nodule, unchnaged 2.4 x 1.3 cm R upper nodular consolidation. New nodular/masslike area of consolidation anteriorly and laterally measuring 3.3 x 1.4 cm, not present on CT 3/22/23. No adenopathy. Consider PET or close surveillance CT in 3 months  7/19/23 Bronch, EBUS (Dr. Garcia). 11L negative, scant cells. 4L unsatisfactory for eval. 8 negative, scant cellularity, 4R unsatisfactory, 11R negative, scan cellularity, RUL negative, showed acute inflammation, limited by scant cellularity.    ASSESSMENT AND PLAN  NSCLC, adeno, RUL: See extensive prior notes. RUL peripheral lesion looks a bit more consolidated since last CT, but marginally so. The difference is more noticeable compared to last year. See Dr. Leung attestation below.      COPD: Remains O2 dependent with exacerbations throughout the year, usually better in warmer months, 2L-3L at home. No change in SOB, however has been coughing more for the past month, likely secondary to second-hand smoke - neighbors have been smoking cigarettes and marijuana outside her apartment, which has been very intrusive on her life; needs  to spray her apartment to control the smell.      DM2: Seeing Dr. Shabazz with endocrinology 2/2024. Started tirazepatide (mounjaro) for T2DM management approximately 2 weeks ago, because could not tolerate ozempic, trulicity. Since then 1-2 wk history of  pre-syncope and palpitations. Tirzaparetide known for causing  cardiovascular side effects of hypotension and tachycardia. Obtain EKG today. Instructed patient to follow up with endocrinology for evaluation of medication change.     Lower back pain: triggered by sleeping in recliner. No radiation to LE. Tenderness to palpation of lumbar spine and right yadi-spinal muscle. Prescribed flexiril at PCP appt previously, however patient did not received me due to it being sent to wrong pharmacy. Will reorder today. CT 5/15/24 showed a new lytic lesion seen on endplate of L2; could be a new metastasis vs. Prominent schmorl's node. Obtain PET scan.     Deconditioning: Getting a little worse over the last 6 months, but not in a position to do PT and not homebound.      Dysphagia: Met with Dr. Briseno in GI 7/18/23, has had pretty extensive evaluation before. Next step is video swallow, possibly EGD if video swallow negative. No abnormalities in the head/neck are on PET 6/30/23, which is reassuring from a cancer perspective. Not discussed today.      Peripheral neuropathy: Mostly driven by DM. No change since prior.      40 minutes spent by me on the date of the encounter doing chart review, history and exam, documentation and further activities per the note     Seen by resident and attending physician, Dr. Madhu Contreras MD  [facility]  Department of Internal Medicine   Resident Physician  PGY-1  Pager: [phone]     Physician Attestation   I, Zarina Leung MD, saw this patient and agree with the findings and plan of care as documented in Dr. Contreras's note.    I personally reviewed vitals, labs, and imaging. To summarize, Jenn is physically doing relatively well. I am worried about the lesion in the spine. On sagittal view, it looks like it could be a schmorl's node, but it is too large on axial view and on axial view looks more worrisome. I recommended PET to get a good look at this area and other areas of the bone. We can also get a good sense of the very  slowly growing R lung consolidated area, which looks a little more consolidated yet albeit not really bigger than last time, although bigger than last year, still a little bit more excessive than what would be anticipated with the prior RT. Bx, as discussed extensively before, is too risky. But if the spine looks abnormal, I would recommend bx and if lung adeno, would recommend palliative RT consideration because it's symptomatic.     Labs from yesterday look fine - CMP and CBC pd. Eos 1000    Having more side effects from current DM meds. Appt not until June, about a month out. I encouraged her to contact her diabetes team to get input and not wait until the next appt. She's dizzy but feels safe at home.     The longitudinal plan of care for the condition(s) below were addressed during this visit. Due to the added complexity in care, I will continue to support Jenn in the subsequent management of this condition(s) and with the ongoing continuity of care of this condition(s): adenocarcinoma of the lung       40 minutes spent by me on the date of the encounter doing chart review, review of outside records, review of test results, interpretation of tests, patient visit, documentation, orders, discussion with other provider(s), discussion with family.     Zarina Leung MD  Associate Professor of Medicine  Hematology, Oncology and Transplantation      SUBJECTIVE  Jenn returns for follow up of lung adenocarcinoma    1 wk hx of central lower back pain, sharp stabbing character; no radiation. Migrated from the left neck, to the back and right arm along the entire length. Hurts with palpation. Started after she was lying down on a recliner. Used lidocaine patch, which minimally helps. Nothing helps. Denies chest pain, no change in SOB. On 4/9 - prescribed flexiril, patient did not   because pharmacy that it was sent to is [facility] in [place].     Feeling lightheaded and feels like she might pass out. Endorses  palpations, and it wakes her up from sleep. More frequent, started 1-2wks ago.     Skin diffusely is peeling, appears like dandruff. Has tried moisturizers. Started 1-2 weeks ago.     Started tirazepatide (mounjaro) for T2DM management, because could not tolerate ozempic, trulicity.    Discussed CT scan and slightly enlarging mass of the RUL. On 2L oxygen. No change SOB. Smelling a considerable amount of cigarette and marijuana smoke outside her apartement building for the past month. Feels like since then she has been coughing more.     PAST MEDICAL HISTORY  Lung adenocarcinoma  DM2 with neuropathy  HCV IA, undetectable in 2019, treated  COPD. O2 dependent since late 2018. PFT 11/26/19. FEV1 0.64 (30%), FVC 1.69 (63%), DLCOunc 9.8 (38%).  HTN  H/o ITP  H/o disk herniation  Ankle surgery  Median neuropathy R  H/o sessile colon polyps 2014, h/o adenomas before that. [place] [facility] [place]ess[place] serated adenoma x 1, tubular adenoma x 3  H/o chronic LBP  Dyslipidemia  Cataracts    CURRENT OUTPATIENT MEDICATIONS  Reviewed    ALLERGIES  Allergies   Allergen Reactions    Aspirin      325mg     Bee Venom Anaphylaxis    Penicillins Hives    Azithromycin Dizziness    Colon Care     Interferons Dermatitis     PHYSICAL EXAM  /76   Pulse 90   Temp 98  F (36.7  C)   Resp 17   SpO2 98%   GEN: NAD  HEENT: EOMI, no icterus, injection or pallor. Oropharynx is clear. Dentition poor.   NECK: no cervical or supraclavicular lymphadenopathy  LUNGS: clear bilaterally  CV: no murmurs, rubs, or gallops. Irregular rhythm.   ABDOMEN: soft, non-tender, non-distended, normal bowel sounds  EXT: warm, well perfused, trace pitting edema  MSK: tenderness to palpation of lumbar spine and right-paraspinal muscles  NEURO: alert, oriented x3  SKIN: exposed skin without rashes, no skin pealing.     LABORATORY AND IMAGING STUDIES    Labs were independently reviewed and interpreted by me    CT Chest/Abdomen/Pelvis w Contrast  Narrative:  EXAM: CT CHEST/ABDOMEN/PELVIS W CONTRAST  LOCATION: [facility]  DATE: 5/15/2024    INDICATION: IA2 [id number] poorly diff adeno RLL s p lobectomy 2016; [id number] IA2 suspected NSCLC RUL s p SBRT 2020. Significant consolidation at site of SBRT, improved Jan 2024. Restaging. Severe COPD  COMPARISON: PET CT performed on 1/17/2024  TECHNIQUE: CT scan of the chest, abdomen, and pelvis was performed following injection of IV contrast. Multiplanar reformats were obtained. Dose reduction techniques were used.   CONTRAST: Isovue 370 101cc    FINDINGS:   LUNGS AND PLEURA: No pleural effusion or pneumothorax is seen. Emphysematous changes are seen in the lungs with upper lobe predominance. Masslike consolidation along the right upper lobe appears slightly larger, measuring up to 4.7 x 3.6 cm, previously   measuring up to 4.7 x 3.0 cm (series 4, image 106). The superior component of the consolidation appears more solid-like in appearance measuring up to 2.3 x 1.9 cm (series 4, image 101). A few new pulmonary nodules are present measuring up to 3 mm in the   right middle lobe (series 4, image 204). Other solid nodules appear unchanged including a 7 mm nodule in the left upper lobe (series 4, image 123).    MEDIASTINUM/AXILLAE: No lymphadenopathy. No thoracic aortic aneurysms. Scattered vascular calcifications are seen within the thoracic aorta.    CORONARY ARTERY CALCIFICATION: Moderate.    HEPATOBILIARY: Liver is enlarged measuring up to 18 cm in length. Gallbladder is unremarkable.    PANCREAS: No significant mass, duct dilatation, or inflammatory change.    SPLEEN: Normal size.    ADRENAL GLANDS: No significant nodules.    KIDNEYS/BLADDER: No hydronephrosis is present.    BOWEL: Diverticulosis of the colon. No acute inflammatory change. No obstruction.     LYMPH NODES: No lymphadenopathy.    VASCULATURE: Scattered vascular calcifications are seen in the abdominal aorta and iliac  branches.    PELVIC ORGANS: No pelvic masses.    MUSCULOSKELETAL: Multilevel degenerative changes are seen in the spine. A new lytic appearing lesion is seen along the superior endplate of L2 measuring up to 1.4 cm (series 8, image 62).  Impression: IMPRESSION:  1.  Slightly enlarging masslike consolidation in the right upper lobe concerning for worsening malignancy.  2.  Several new small pulmonary nodules measuring up to 3 mm which are indeterminant and may reflect focal inflammation/infection. Metastatic disease cannot be excluded. Suggest attention on follow-up exam.  3.  A new lytic appearing lesion is seen along the superior endplate of L2. Differential diagnosis includes metastatic disease versus prominent Schmorl's node. Suggest correlation with bone scan or follow-up PET/CT.        Sincerely,        Zarina Leung MD

## 2024-05-16 NOTE — PROGRESS NOTES
[facility]    PATIENT NAME: Jenn Aparicio  MRN # [id number]   DATE OF VISIT: May 16, 2024  YOB: 1955     CANCER TYPE: Lung adenocarcinoma  STAGE: IA2 [id number] poorly diff adeno RLL, then [id number] IA2 suspected NSCLC RUL, medically inoperable      ECOG PS: 2 (COPD)     PD-L1: N/A  Lung panel: N/A  NGS: N/A     SUMMARY  12/24/15 PET/CT  1/13/15 CT lung bx. Adenocarcinoma  2/8/16 R thoracotomy, RLL lobectomy (Dr. Cunha). Adenocarcinoma, 1.1 cm, assoicated with atypical adenomatous hyperplasia  10/18/19 CTA for shortness of breath. New 1.3 cm RUL nodule  11/2019 PET/CT. 1.1 cm RUL hypermetabolic nodule (SUV 12.6)  12/26/19 Brain MRI negative for mets  1/14~1/28/20 SBRT to RUL nodule, 5000 cGy, every other day, 1000 cGy (Dr. Currie at [facility]). No bx due to O2 dependence and too much risk  8/19/20 First visit with me   11/29/21 CT chest. New 10 mm RUL nodule  1/11/22 CT chest. New 7.2 mm RUL nodule, unchanged RUL nodules  3/31/22 CT chest. New RUL nodule from Jan 2022 7-->10 mm, new 5 mm ROBERT nodule  5/20~5/24/22 [facility] for COPD exacerbation.   6/24/22 CT chest non contrast.   8/16/22 EGD. 3 cm hiatal hernia, o/w normal  8/31/22 CT chest. 2.5 x 1.3 cm R midlung subpleural nodularity (4:98) previously 2.3 x 1.1 cm, slightly increased solid component   10/5/22 PET/CT. 2 x 1.3 cm irregular opacity posterior RUL (SUV 2.46), 2.4 x 0.8 cm linear opacity (SUV 4.76); there was bronchiectasia on prior imaging. Stable 1.1 x 0.8 cm non FDG avid RUL opacity and unchanged 4 mm posterior RUL nodule. No adenopathy. Inferior right breast uptake (SUV 6.09) likely infectious or inflammatory.   2/3~2/9/23 [facility] for COPD exacerbation  2/4/23 CTA during hospitalization. No significant change in 2.3 x 1.5 cm spiculated nodule in the right upper lobe (series 7 image 94) with surrounding linear parenchymal opacities and subtle nodularity  2/20~2/24/23 [facility] for COPD exacerbation.   3/1/23 CT chest non contrast. 2.3 cm  posterior RUL irregular nodule unchanged from 12/1/22 however slightly increased soft tissue component compared to 10/5/22 PET. Stable ROBERT mucous plugging with micronodularity.  3/22/23 CT chest. Stable compared to 3/1/23  5/25/23 CT chest abd w/contrast. Unchanged 1.3 x 0.5 cm R apical nodule, unchnaged 2.4 x 1.3 cm R upper nodular consolidation. New nodular/masslike area of consolidation anteriorly and laterally measuring 3.3 x 1.4 cm, not present on CT 3/22/23. No adenopathy. Consider PET or close surveillance CT in 3 months  7/19/23 Bronch, EBUS (Dr. Garcia). 11L negative, scant cells. 4L unsatisfactory for eval. 8 negative, scant cellularity, 4R unsatisfactory, 11R negative, scan cellularity, RUL negative, showed acute inflammation, limited by scant cellularity.    ASSESSMENT AND PLAN  NSCLC, adeno, RUL: See extensive prior notes. RUL peripheral lesion looks a bit more consolidated since last CT, but marginally so. The difference is more noticeable compared to last year. See Dr. Leung attestation below.      COPD: Remains O2 dependent with exacerbations throughout the year, usually better in warmer months, 2L-3L at home. No change in SOB, however has been coughing more for the past month, likely secondary to second-hand smoke - neighbors have been smoking cigarettes and marijuana outside her apartment, which has been very intrusive on her life; needs  to spray her apartment to control the smell.      DM2: Seeing Dr. Shabazz with endocrinology 2/2024. Started tirazepatide (mounjaro) for T2DM management approximately 2 weeks ago, because could not tolerate ozempic, trulicity. Since then 1-2 wk history of  pre-syncope and palpitations. Tirzaparetide known for causing cardiovascular side effects of hypotension and tachycardia. Obtain EKG today. Instructed patient to follow up with endocrinology for evaluation of medication change.     Lower back pain: triggered by sleeping in recliner. No radiation to LE.  Tenderness to palpation of lumbar spine and right yadi-spinal muscle. Prescribed flexiril at PCP appt previously, however patient did not received me due to it being sent to wrong pharmacy. Will reorder today. CT 5/15/24 showed a new lytic lesion seen on endplate of L2; could be a new metastasis vs. Prominent schmorl's node. Obtain PET scan.     Deconditioning: Getting a little worse over the last 6 months, but not in a position to do PT and not homebound.      Dysphagia: Met with Dr. Briseno in GI 7/18/23, has had pretty extensive evaluation before. Next step is video swallow, possibly EGD if video swallow negative. No abnormalities in the head/neck are on PET 6/30/23, which is reassuring from a cancer perspective. Not discussed today.      Peripheral neuropathy: Mostly driven by DM. No change since prior.      40 minutes spent by me on the date of the encounter doing chart review, history and exam, documentation and further activities per the note     Seen by resident and attending physician, Dr. Madhu Contreras MD  [facility]  Department of Internal Medicine   Resident Physician  PGY-1  Pager: [phone]     Physician Attestation   I, Zarina Leung MD, saw this patient and agree with the findings and plan of care as documented in Dr. Contreras's note.    I personally reviewed vitals, labs, and imaging. To summarize, Jenn is physically doing relatively well. I am worried about the lesion in the spine. On sagittal view, it looks like it could be a schmorl's node, but it is too large on axial view and on axial view looks more worrisome. I recommended PET to get a good look at this area and other areas of the bone. We can also get a good sense of the very slowly growing R lung consolidated area, which looks a little more consolidated yet albeit not really bigger than last time, although bigger than last year, still a little bit more excessive than what would be anticipated with the prior RT.  Bx, as discussed extensively before, is too risky. But if the spine looks abnormal, I would recommend bx and if lung adeno, would recommend palliative RT consideration because it's symptomatic.     Labs from yesterday look fine - CMP and CBC pd. Eos 1000    Having more side effects from current DM meds. Appt not until June, about a month out. I encouraged her to contact her diabetes team to get input and not wait until the next appt. She's dizzy but feels safe at home.     The longitudinal plan of care for the condition(s) below were addressed during this visit. Due to the added complexity in care, I will continue to support Jenn in the subsequent management of this condition(s) and with the ongoing continuity of care of this condition(s): adenocarcinoma of the lung       40 minutes spent by me on the date of the encounter doing chart review, review of outside records, review of test results, interpretation of tests, patient visit, documentation, orders, discussion with other provider(s), discussion with family.     Zarina Leung MD  Associate Professor of Medicine  Hematology, Oncology and Transplantation      SUBJECTIVE  Jenn returns for follow up of lung adenocarcinoma    1 wk hx of central lower back pain, sharp stabbing character; no radiation. Migrated from the left neck, to the back and right arm along the entire length. Hurts with palpation. Started after she was lying down on a recliner. Used lidocaine patch, which minimally helps. Nothing helps. Denies chest pain, no change in SOB. On 4/9 - prescribed flexiril, patient did not   because pharmacy that it was sent to is [facility] in [place].     Feeling lightheaded and feels like she might pass out. Endorses palpations, and it wakes her up from sleep. More frequent, started 1-2wks ago.     Skin diffusely is peeling, appears like dandruff. Has tried moisturizers. Started 1-2 weeks ago.     Started tirazepatide (mounjaro) for T2DM management, because  could not tolerate ozempic, trulicity.    Discussed CT scan and slightly enlarging mass of the RUL. On 2L oxygen. No change SOB. Smelling a considerable amount of cigarette and marijuana smoke outside her apartement building for the past month. Feels like since then she has been coughing more.     PAST MEDICAL HISTORY  Lung adenocarcinoma  DM2 with neuropathy  HCV IA, undetectable in 2019, treated  COPD. O2 dependent since late 2018. PFT 11/26/19. FEV1 0.64 (30%), FVC 1.69 (63%), DLCOunc 9.8 (38%).  HTN  H/o ITP  H/o disk herniation  Ankle surgery  Median neuropathy R  H/o sessile colon polyps 2014, h/o adenomas before that. Colonoscopy 2/7/18 @ [facility]. Sessile serated adenoma x 1, tubular adenoma x 3  H/o chronic LBP  Dyslipidemia  Cataracts    CURRENT OUTPATIENT MEDICATIONS  Reviewed    ALLERGIES  Allergies   Allergen Reactions    Aspirin      325mg     Bee Venom Anaphylaxis    Penicillins Hives    Azithromycin Dizziness    Colon Care     Interferons Dermatitis     PHYSICAL EXAM  /76   Pulse 90   Temp 98  F (36.7  C)   Resp 17   SpO2 98%   GEN: NAD  HEENT: EOMI, no icterus, injection or pallor. Oropharynx is clear. Dentition poor.   NECK: no cervical or supraclavicular lymphadenopathy  LUNGS: clear bilaterally  CV: no murmurs, rubs, or gallops. Irregular rhythm.   ABDOMEN: soft, non-tender, non-distended, normal bowel sounds  EXT: warm, well perfused, trace pitting edema  MSK: tenderness to palpation of lumbar spine and right-paraspinal muscles  NEURO: alert, oriented x3  SKIN: exposed skin without rashes, no skin pealing.     LABORATORY AND IMAGING STUDIES    Labs were independently reviewed and interpreted by me    CT Chest/Abdomen/Pelvis w Contrast  Narrative: EXAM: CT CHEST/ABDOMEN/PELVIS W CONTRAST  LOCATION: [facility]  DATE: 5/15/2024    INDICATION: IA2 [id number] poorly diff adeno RLL s p lobectomy 2016; [id number] IA2 suspected NSCLC RUL s p SBRT 2020.  Significant consolidation at site of SBRT, improved Jan 2024. Restaging. Severe COPD  COMPARISON: PET CT performed on 1/17/2024  TECHNIQUE: CT scan of the chest, abdomen, and pelvis was performed following injection of IV contrast. Multiplanar reformats were obtained. Dose reduction techniques were used.   CONTRAST: Isovue 370 101cc    FINDINGS:   LUNGS AND PLEURA: No pleural effusion or pneumothorax is seen. Emphysematous changes are seen in the lungs with upper lobe predominance. Masslike consolidation along the right upper lobe appears slightly larger, measuring up to 4.7 x 3.6 cm, previously   measuring up to 4.7 x 3.0 cm (series 4, image 106). The superior component of the consolidation appears more solid-like in appearance measuring up to 2.3 x 1.9 cm (series 4, image 101). A few new pulmonary nodules are present measuring up to 3 mm in the   right middle lobe (series 4, image 204). Other solid nodules appear unchanged including a 7 mm nodule in the left upper lobe (series 4, image 123).    MEDIASTINUM/AXILLAE: No lymphadenopathy. No thoracic aortic aneurysms. Scattered vascular calcifications are seen within the thoracic aorta.    CORONARY ARTERY CALCIFICATION: Moderate.    HEPATOBILIARY: Liver is enlarged measuring up to 18 cm in length. Gallbladder is unremarkable.    PANCREAS: No significant mass, duct dilatation, or inflammatory change.    SPLEEN: Normal size.    ADRENAL GLANDS: No significant nodules.    KIDNEYS/BLADDER: No hydronephrosis is present.    BOWEL: Diverticulosis of the colon. No acute inflammatory change. No obstruction.     LYMPH NODES: No lymphadenopathy.    VASCULATURE: Scattered vascular calcifications are seen in the abdominal aorta and iliac branches.    PELVIC ORGANS: No pelvic masses.    MUSCULOSKELETAL: Multilevel degenerative changes are seen in the spine. A new lytic appearing lesion is seen along the superior endplate of L2 measuring up to 1.4 cm (series 8, image  62).  Impression: IMPRESSION:  1.  Slightly enlarging masslike consolidation in the right upper lobe concerning for worsening malignancy.  2.  Several new small pulmonary nodules measuring up to 3 mm which are indeterminant and may reflect focal inflammation/infection. Metastatic disease cannot be excluded. Suggest attention on follow-up exam.  3.  A new lytic appearing lesion is seen along the superior endplate of L2. Differential diagnosis includes metastatic disease versus prominent Schmorl's node. Suggest correlation with bone scan or follow-up PET/CT.

## 2024-05-17 DIAGNOSIS — E11.9 TYPE 2 DIABETES MELLITUS WITHOUT COMPLICATION, WITHOUT LONG-TERM CURRENT USE OF INSULIN (H): ICD-10-CM

## 2024-05-21 RX ORDER — TIRZEPATIDE 2.5 MG/.5ML
INJECTION, SOLUTION SUBCUTANEOUS
Qty: 2 ML | Refills: 0 | OUTPATIENT
Start: 2024-05-21

## 2024-05-27 ENCOUNTER — APPOINTMENT (OUTPATIENT)
Dept: GENERAL RADIOLOGY | Facility: CLINIC | Age: 69
End: 2024-05-27
Attending: EMERGENCY MEDICINE
Payer: COMMERCIAL

## 2024-05-27 ENCOUNTER — NURSE TRIAGE (OUTPATIENT)
Dept: NURSING | Facility: CLINIC | Age: 69
End: 2024-05-27
Payer: COMMERCIAL

## 2024-05-27 ENCOUNTER — HOSPITAL ENCOUNTER (EMERGENCY)
Facility: CLINIC | Age: 69
Discharge: HOME | End: 2024-05-27
Attending: EMERGENCY MEDICINE | Admitting: EMERGENCY MEDICINE
Payer: COMMERCIAL

## 2024-05-27 VITALS
TEMPERATURE: 98.4 F | RESPIRATION RATE: 16 BRPM | DIASTOLIC BLOOD PRESSURE: 68 MMHG | SYSTOLIC BLOOD PRESSURE: 120 MMHG | HEART RATE: 107 BPM | OXYGEN SATURATION: 97 %

## 2024-05-27 DIAGNOSIS — S93.401A SPRAIN OF RIGHT ANKLE, UNSPECIFIED LIGAMENT, INITIAL ENCOUNTER: ICD-10-CM

## 2024-05-27 PROCEDURE — 73610 X-RAY EXAM OF ANKLE: CPT | Mod: 26 | Performed by: RADIOLOGY

## 2024-05-27 PROCEDURE — 99284 EMERGENCY DEPT VISIT MOD MDM: CPT | Mod: 25 | Performed by: EMERGENCY MEDICINE

## 2024-05-27 PROCEDURE — 73590 X-RAY EXAM OF LOWER LEG: CPT | Mod: 26 | Performed by: RADIOLOGY

## 2024-05-27 PROCEDURE — 99283 EMERGENCY DEPT VISIT LOW MDM: CPT | Performed by: EMERGENCY MEDICINE

## 2024-05-27 PROCEDURE — 29515 APPLICATION SHORT LEG SPLINT: CPT | Mod: RT | Performed by: EMERGENCY MEDICINE

## 2024-05-27 PROCEDURE — 73610 X-RAY EXAM OF ANKLE: CPT | Mod: RT

## 2024-05-27 PROCEDURE — 73590 X-RAY EXAM OF LOWER LEG: CPT | Mod: RT

## 2024-05-27 RX ORDER — TRAMADOL HYDROCHLORIDE 50 MG/1
50 TABLET ORAL EVERY 6 HOURS PRN
Qty: 10 TABLET | Refills: 0 | Status: SHIPPED | OUTPATIENT
Start: 2024-05-27 | End: 2024-05-30

## 2024-05-27 ASSESSMENT — COLUMBIA-SUICIDE SEVERITY RATING SCALE - C-SSRS
2. HAVE YOU ACTUALLY HAD ANY THOUGHTS OF KILLING YOURSELF IN THE PAST MONTH?: NO
1. IN THE PAST MONTH, HAVE YOU WISHED YOU WERE DEAD OR WISHED YOU COULD GO TO SLEEP AND NOT WAKE UP?: NO
6. HAVE YOU EVER DONE ANYTHING, STARTED TO DO ANYTHING, OR PREPARED TO DO ANYTHING TO END YOUR LIFE?: NO

## 2024-05-27 ASSESSMENT — ACTIVITIES OF DAILY LIVING (ADL)
ADLS_ACUITY_SCORE: 38
ADLS_ACUITY_SCORE: 38

## 2024-05-27 NOTE — DISCHARGE INSTRUCTIONS
You will be placed into a walking boot today to help support your ankle.  Use the walking boot when ambulating with your walker.  Otherwise elevate your leg is much as possible above your chest over the next 3 to 5 days.    A referral to orthopedics has been placed into the computer system.  They should call you in the next 48 hours to help you set up an appointment to be seen sometime in the next week.  If they do not call you please call them at [facility] (phone: [phone]).

## 2024-05-27 NOTE — ED TRIAGE NOTES
Triage Assessment (Adult)       Row Name 05/27/24 1714          Triage Assessment    Airway WDL WDL        Respiratory WDL    Respiratory WDL WDL        Skin Circulation/Temperature WDL    Skin Circulation/Temperature WDL WDL        Cardiac WDL    Cardiac WDL WDL        Peripheral/Neurovascular WDL    Peripheral Neurovascular WDL X        Cognitive/Neuro/Behavioral WDL    Cognitive/Neuro/Behavioral WDL WDL

## 2024-05-27 NOTE — ED TRIAGE NOTES
Pt biba from home w/ c/o ankle pain. Triage obtained by EMS. Pt endorses ground level fall after tripping on oxygen tubing on 5/20. Pt tried to manage pain at home w/ ibuprofen and not bearing weight per recommendation of ED visit to outside facility w/ out relief.       Addendum: pt denies headstrike, loc w/ fall. Pt expresses that she lives at home alone and that performing ADL's and staying on top of her medications has become increasingly difficult d/t injury.

## 2024-05-27 NOTE — TELEPHONE ENCOUNTER
Nurse Triage SBAR    Is this a 2nd Level Triage? NO    Situation: Leg Injury    Background: :Patient had tripped on her oxygen tubing a week ago.    Assessment: Patient's daughter, Maureen, calling (verbal CTC given by patient) to report right leg is swollen and painful from calf to toes. Patient reports she has not been able to bear weight on the right leg since injury. Swelling has gotten worse and pain is severe. She noted new onset of light pink discoloration on inside of ankle and around to the other side.     Protocol Recommended Disposition:   According to the protocol, patient should call 911.  Care advice given. Patient verbalizes understanding and agrees with plan of care. Plans to go to [place] ED.    Melissa Berry RN  05/27/24 2:40 PM  [facility] Nurse Advisor      Reason for Disposition   Can't stand (bear weight) or walk    Additional Information   Negative: Serious injury with multiple fractures (broken bones)   Negative: [1] Major bleeding (e.g., actively dripping or spurting) AND [2] can't be stopped   Negative: Bullet wound, stabbed by knife, or other serious penetrating wound   Negative: Looks like a dislocated joint (crooked or deformed)    Protocols used: Leg Injury-A-

## 2024-05-27 NOTE — ED PROVIDER NOTES
[place] EMERGENCY DEPARTMENT ([facility])    5/27/24       ED PROVIDER NOTE    History     Chief Complaint   Patient presents with    Ankle Pain     HPI  Jenn Aparicio is a 68 year old female on oxygen at home who tripped over her oxygen tubing on Wednesday of last week and sustained a injury to her right ankle. Patient tried to ice and elevate at home but eventually it started to swell and so she went to [facility] on Friday where she apparently had some x-rays done that were negative. Care everywhere does show an x-ray of her right ankle done on the 24th 3 days ago that was unremarkable. The patient however since that time has had increased swelling and pain and presents here for evaluation because she thinks something is been missed.    This part of the document was transcribed by Mitch Beasley Medical Scribe.     Past Medical History  Past Medical History:   Diagnosis Date    Adenocarcinoma, lung (H)     Asthma     Ectopic pregnancy     Esophageal reflux     Pulmonary emphysema (H)     Very severe FEV1<30% predicted    Type II diabetes mellitus (H)      Past Surgical History:   Procedure Laterality Date    2/8/16                R thoracotomy, RLL lobectomy (Dr. Cunha). Adenocarcinoma, 1.1 cm, assoicated with atypical adenomatous hyperplasia Right 02/08/2016    R thoracotomy, RLL lobectomy (Dr. Cunha). Adenocarcinoma, 1.1 cm, assoicated with atypical adenomatous hyperplasia    BRONCHOSCOPY, WITH BIOPSY, ROBOT ASSISTED N/A 7/19/2023    Procedure: robot assisted Ion BRONCHOSCOPY, WITH BIOPSY;  Surgeon: Patria Garcia MD;  Location: [facility] OR    ENDOBRONCHIAL ULTRASOUND FLEXIBLE N/A 7/19/2023    Procedure: Endobronchial ultrasound flexible;  Surgeon: Patria Garcia MD;  Location:  OR    ESOPHAGOSCOPY, GASTROSCOPY, DUODENOSCOPY (EGD), COMBINED N/A 8/16/2022    Procedure: ESOPHAGOGASTRODUODENOSCOPY (EGD);  Surgeon: Travis Briseno MD;  [facility]    ESOPHAGOSCOPY, GASTROSCOPY, DUODENOSCOPY  (EGD), COMBINED N/A 8/8/2023    Procedure: ESOPHAGOGASTRODUODENOSCOPY, WITH BIOPSY;  Surgeon: Chao Rodrigues DO;  Location:  GI    ORTHOPEDIC SURGERY      PHACOEMULSIFICATION CLEAR CORNEA WITH STANDARD INTRAOCULAR LENS IMPLANT Left 8/24/2023    Procedure: LEFT EYE PHACOEMULSIFICATION, CATARACT, WITH INTRAOCULAR LENS IMPLANT;  Surgeon: Re Keita MD;  Location: [facility] OR    PHACOEMULSIFICATION CLEAR CORNEA WITH STANDARD INTRAOCULAR LENS IMPLANT Right 9/5/2023    Procedure: RIGHT EYE PHACOEMULSIFICATION, CATARACT, WITH INTRAOCULAR LENS IMPLANT;  Surgeon: Re Keita MD;  Location: [facility] OR     acetylcysteine (MUCOMYST) 10 % nebulizer solution  albuterol (PROAIR HFA/PROVENTIL HFA/VENTOLIN HFA) 108 (90 Base) MCG/ACT inhaler  albuterol (PROVENTIL) (2.5 MG/3ML) 0.083% neb solution  Alpha-Lipoic Acid 300 MG TABS  alpha-lipoic acid 600 MG capsule  aspirin 81 MG EC tablet  atorvastatin (LIPITOR) 10 MG tablet  blood glucose (ONETOUCH ULTRA) test strip  capsaicin (ZOSTRIX) 0.025 % external cream  carboxymethylcellulose (REFRESH LIQUIGEL) 1 % ophthalmic solution  cetirizine (ZYRTEC) 10 MG tablet  Continuous Blood Gluc  (DEXCOM G7 ) JOSEPHINE  Continuous Blood Gluc Sensor (DEXCOM G7 SENSOR) MISC  Continuous Blood Gluc Sensor (FREESTYLE GIOVANNI 2 SENSOR) MISC  cyanocobalamin (VITAMIN B-12) 500 MCG tablet  cyclobenzaprine (FLEXERIL) 5 MG tablet  cyclobenzaprine (FLEXERIL) 5 MG tablet  DULoxetine (CYMBALTA) 30 MG capsule  empagliflozin (JARDIANCE) 25 MG TABS tablet  EPINEPHrine (ANY BX GENERIC EQUIV) 0.3 MG/0.3ML injection 2-pack  fluticasone (FLONASE) 50 MCG/ACT nasal spray  Fluticasone-Umeclidin-Vilanterol (TRELEGY ELLIPTA) 200-62.5-25 MCG/ACT oral inhaler  glipiZIDE (GLUCOTROL) 5 MG tablet  GLUCOSAMINE-CHONDROITIN -400 MG tablet  ipratropium - albuterol 0.5 mg/2.5 mg/3 mL (DUONEB) 0.5-2.5 (3) MG/3ML neb solution  losartan (COZAAR) 50 MG tablet  omega-3 acid ethyl esters (LOVAZA) 1 g capsule  omeprazole  (PRILOSEC) 20 MG DR capsule  polyethylene glycol-propylene glycol (SYSTANE) 0.4-0.3 % SOLN ophthalmic solution  pregabalin (LYRICA) 150 MG capsule  roflumilast (DALIRESP) 500 MCG TABS tablet  STATIN NOT PRESCRIBED (INTENTIONAL)  tirzepatide (MOUNJARO) 2.5 MG/0.5ML pen  tirzepatide (MOUNJARO) 5 MG/0.5ML pen      Allergies   Allergen Reactions    Aspirin      325mg     Bee Venom Anaphylaxis    Penicillins Hives    Azithromycin Dizziness    Colon Care     Interferons Dermatitis     Family History  Family History   Problem Relation Age of Onset    Thyroid Disease Mother     Cerebrovascular Disease Mother     Hypertension Mother     Hypertension Father     Glaucoma Father     Cancer Sister     Lung Cancer Sister     Diabetes Brother     Cancer Brother     Diabetes Brother     Cancer Brother     Diabetes Brother     Deep Vein Thrombosis (DVT) Daughter     Depression Daughter     Alcohol/Drug Other         self    Diabetes Other         self    Thyroid Disease Other         self    Asthma Other         self    Macular Degeneration No family hx of     Anesthesia Reaction No family hx of      Social History   Social History     Tobacco Use    Smoking status: Former     Current packs/day: 0.00     Types: Cigarettes     Quit date:      Years since quittin.4     Passive exposure: Past    Smokeless tobacco: Never    Tobacco comments:     2023 Patient using 14 mg patch, wants to have prescription for Nicotrol inhaler, took workbook   Substance Use Topics    Alcohol use: Not Currently    Drug use: No      A medically appropriate review of systems was performed with pertinent positives and negatives noted in the HPI, and all other systems negative.    Physical Exam   BP: 120/68  Pulse: 107  Temp: 98.4  F (36.9  C)  Resp: 16  SpO2: 97 %  Physical Exam  Vitals and nursing note reviewed.   HENT:      Head: Atraumatic.   Eyes:      Extraocular Movements: Extraocular movements intact.      Pupils: Pupils are equal, round,  and reactive to light.   Pulmonary:      Effort: No respiratory distress.   Musculoskeletal:      Cervical back: Neck supple.      Comments: Right lower extremity reveals significant 2+ edema with tenderness of the proximal tib-fib as well as the bilateral malleolus.  Patient has no foot bony tenderness.   Neurological:      General: No focal deficit present.      Mental Status: She is alert and oriented to person, place, and time.   Psychiatric:         Mood and Affect: Mood normal.           ED Course, Procedures, & Data      Orders Placed This Encounter   Procedures    WALKING BOOT, NON-PNEUMATIC, WITH OR WITHOUT JOINTS, WITH OR WITHOUT INTERFACE    Ankle XR, G/E 3 views, right    XR Tibia and Fibula Right 2 Views    Orthopedic  Referral       Procedures        Results for orders placed or performed during the hospital encounter of 05/27/24   Ankle XR, G/E 3 views, right     Status: None    Narrative    EXAM: XR ANKLE RIGHT G/E 3 VIEWS, XR TIBIA AND FIBULA RIGHT 2 VIEWS  LOCATION: [facility]  DATE: 5/27/2024    INDICATION: Trauma.  COMPARISON: None.      Impression    IMPRESSION: There is soft tissue swelling. Ankle mortise is intact. There is no evidence of an acute fracture involving the right ankle, tibia or fibula. Degenerative changes at the knee.   XR Tibia and Fibula Right 2 Views     Status: None    Narrative    EXAM: XR ANKLE RIGHT G/E 3 VIEWS, XR TIBIA AND FIBULA RIGHT 2 VIEWS  LOCATION: [facility]  DATE: 5/27/2024    INDICATION: Trauma.  COMPARISON: None.      Impression    IMPRESSION: There is soft tissue swelling. Ankle mortise is intact. There is no evidence of an acute fracture involving the right ankle, tibia or fibula. Degenerative changes at the knee.        Critical care was not performed.     Medical Decision Making  The patient's presentation was of low complexity (an acute and uncomplicated  illness or injury).    The patient's evaluation involved:  ordering and/or review of 2 test(s) in this encounter (see above)    The patient's management necessitated moderate risk (prescription drug management including medications given in the ED).    Assessment & Plan      I have reviewed the nursing notes. I have reviewed the findings, diagnosis, plan and need for follow up with the patient.    New Prescriptions    TRAMADOL (ULTRAM) 50 MG TABLET    Take 1 tablet (50 mg) by mouth every 6 hours as needed for moderate to severe pain       Final diagnoses:   Sprain of right ankle, unspecified ligament, initial encounter     You will be placed into a walking boot today to help support your ankle.  Use the walking boot when ambulating with your walker.  Otherwise elevate your leg is much as possible above your chest over the next 3 to 5 days.    A referral to orthopedics has been placed into the computer system.  They should call you in the next 48 hours to help you set up an appointment to be seen sometime in the next week.  If they do not call you please call them at [facility] (phone: [phone]).    Routine discharge instructions were given for this diagnosis    Edward Graves MD  [facility] EMERGENCY DEPARTMENT  5/27/2024        Edward Graves MD  05/27/24 [id number]

## 2024-06-07 ENCOUNTER — VIRTUAL VISIT (OUTPATIENT)
Dept: PHARMACY | Facility: CLINIC | Age: 69
End: 2024-06-07
Payer: COMMERCIAL

## 2024-06-07 DIAGNOSIS — E11.9 TYPE 2 DIABETES MELLITUS WITHOUT COMPLICATION, WITHOUT LONG-TERM CURRENT USE OF INSULIN (H): Primary | ICD-10-CM

## 2024-06-07 PROCEDURE — 99606 MTMS BY PHARM EST 15 MIN: CPT | Mod: 93 | Performed by: PHARMACIST

## 2024-06-07 PROCEDURE — 99607 MTMS BY PHARM ADDL 15 MIN: CPT | Mod: 93 | Performed by: PHARMACIST

## 2024-06-07 NOTE — PROGRESS NOTES
Medication Therapy Management (MTM) Encounter    ASSESSMENT:                            Medication Adherence/Access: No issues identified    Diabetes:   Patient is not meeting A1c goal of < 7%. Patient would benefit from resuming blood glucose monitoring and from resuming Mounjaro and glipizide.       PLAN:                            Restart both Mounjaro and glipizide.   Restart checking blood glucose daily    Follow-up: Return in about 2 weeks (around 6/21/2024) for Follow up, with me, using a phone visit.    SUBJECTIVE/OBJECTIVE:                          Jenn Aparicio is a 68 year old female called for a follow-up visit.       Reason for visit: diabetes follow-up.    Allergies/ADRs: Reviewed in chart  Past Medical History: Reviewed in chart  Tobacco: She reports that she quit smoking about 17 months ago. Her smoking use included cigarettes. She has been exposed to tobacco smoke. She has never used smokeless tobacco.  Alcohol: none    Medication Adherence/Access: no issues reported    Diabetes   Jardiance 25 mg daily  Mounjaro 5 mg once weekly - just received it and will start on Monday  Glipizide 5 mg twice a day - not taking right now but will plan to restart this weekend    Metformin, developed diarrhea, has tried both extended release and short acting formulations      Blood sugar monitoring: Ranges (patient reported):   Has not been checking blood glucose since spraining her ankle. Hasn't checked in the last 5 days.   Reports right after spraining her ankle she stopped medications and blood glucose was 150 mg/dL but also notes she wasn't eating much during that time.          Eye exam is up to date  Foot exam is up to date    Today's Vitals: There were no vitals taken for this visit.  ----------------  Post Discharge Medication Reconciliation Status: discharge medications reconciled, continue medications without change.    I spent  5 minutes with this patient today. All changes were made via collaborative  practice agreement with BENOIT Ruiz CNP. A copy of the visit note was provided to the patient's provider(s).    A summary of these recommendations was sent via [employer].    Samy Prasad, Pharm. D., BCACP  Medication Therapy Management Pharmacist      Telemedicine Visit Details  Type of service:  Telephone visit  Start Time:  9:01 am  End Time: 9:06 AM     Medication Therapy Recommendations  Diabetic neuropathy (H)    Current Medication: tirzepatide (MOUNJARO) 5 MG/0.5ML pen   Rationale: Patient forgets to take - Adherence - Adherence   Recommendation: Provide Education   Status: Patient Agreed - Adherence/Education

## 2024-06-07 NOTE — PATIENT INSTRUCTIONS
"Recommendations from today's MTM visit:                                                    MTM (medication therapy management) is a service provided by a clinical pharmacist designed to help you get the most of out of your medicines.   Today we reviewed what your medicines are for, how to know if they are working, that your medicines are safe and how to make your medicine regimen as easy as possible.    Restart both Mounjaro and glipizide.   Restart checking blood glucose daily    Follow-up: Return in about 2 weeks (around 6/21/2024) for Follow up, with me, using a phone visit.    It was great speaking with you today.  I value your experience and would be very thankful for your time in providing feedback in our clinic survey. In the next few days, you may receive an email or text message from [employer] with a link to a survey related to your  clinical pharmacist.\"     To schedule another MTM appointment, please call the clinic directly or you may call the MTM scheduling line at [phone] or toll-free at [phone].     My Clinical Pharmacist's contact information:                                                      Please feel free to contact me with any questions or concerns you have.      Samy Prasad, Pharm. D., BCACP  Medication Therapy Management Pharmacist    "

## 2024-06-11 ENCOUNTER — HOSPITAL ENCOUNTER (OUTPATIENT)
Dept: PET IMAGING | Facility: CLINIC | Age: 69
Discharge: HOME | End: 2024-06-11
Attending: INTERNAL MEDICINE | Admitting: INTERNAL MEDICINE
Payer: COMMERCIAL

## 2024-06-11 DIAGNOSIS — M89.9 BONE LESION: ICD-10-CM

## 2024-06-11 DIAGNOSIS — C34.31 MALIGNANT NEOPLASM OF LOWER LOBE OF RIGHT LUNG (H): ICD-10-CM

## 2024-06-11 PROCEDURE — 78816 PET IMAGE W/CT FULL BODY: CPT | Mod: 26 | Performed by: RADIOLOGY

## 2024-06-11 PROCEDURE — 343N000001 HC RX 343: Performed by: INTERNAL MEDICINE

## 2024-06-11 PROCEDURE — 78816 PET IMAGE W/CT FULL BODY: CPT | Mod: PS

## 2024-06-11 PROCEDURE — A9552 F18 FDG: HCPCS | Performed by: INTERNAL MEDICINE

## 2024-06-11 RX ORDER — FLUDEOXYGLUCOSE F 18 200 MCI/ML
10-18 INJECTION, SOLUTION INTRAVENOUS ONCE
Status: COMPLETED | OUTPATIENT
Start: 2024-06-11 | End: 2024-06-11

## 2024-06-11 RX ADMIN — FLUDEOXYGLUCOSE F 18 12.31 MILLICURIE: 200 INJECTION, SOLUTION INTRAVENOUS at 14:22

## 2024-06-13 ENCOUNTER — ONCOLOGY VISIT (OUTPATIENT)
Dept: ONCOLOGY | Facility: CLINIC | Age: 69
End: 2024-06-13
Attending: INTERNAL MEDICINE
Payer: COMMERCIAL

## 2024-06-13 VITALS
SYSTOLIC BLOOD PRESSURE: 153 MMHG | RESPIRATION RATE: 16 BRPM | TEMPERATURE: 98.1 F | WEIGHT: 205 LBS | BODY MASS INDEX: 33.09 KG/M2 | HEART RATE: 91 BPM | DIASTOLIC BLOOD PRESSURE: 75 MMHG | OXYGEN SATURATION: 98 %

## 2024-06-13 DIAGNOSIS — C34.90 LUNG CANCER METASTATIC TO BONE (H): ICD-10-CM

## 2024-06-13 DIAGNOSIS — M89.8X9 BONE DISEASE, METABOLIC: ICD-10-CM

## 2024-06-13 DIAGNOSIS — C34.31 MALIGNANT NEOPLASM OF LOWER LOBE OF RIGHT LUNG (H): Primary | ICD-10-CM

## 2024-06-13 DIAGNOSIS — C79.51 LUNG CANCER METASTATIC TO BONE (H): ICD-10-CM

## 2024-06-13 PROCEDURE — G2211 COMPLEX E/M VISIT ADD ON: HCPCS | Performed by: INTERNAL MEDICINE

## 2024-06-13 PROCEDURE — G0463 HOSPITAL OUTPT CLINIC VISIT: HCPCS | Performed by: INTERNAL MEDICINE

## 2024-06-13 PROCEDURE — 99214 OFFICE O/P EST MOD 30 MIN: CPT | Performed by: INTERNAL MEDICINE

## 2024-06-13 ASSESSMENT — PAIN SCALES - GENERAL: PAINLEVEL: WORST PAIN (10)

## 2024-06-13 NOTE — NURSING NOTE
"Oncology Rooming Note    June 13, 2024 12:09 PM   Jenn Aparicio is a 68 year old female who presents for:    Chief Complaint   Patient presents with    Oncology Clinic Visit     Lung Ca     Initial Vitals: BP (!) 153/75 (BP Location: Right arm, Patient Position: Sitting, Cuff Size: Adult Regular)   Pulse 91   Temp 98.1  F (36.7  C) (Oral)   Resp 16   Wt 93 kg (205 lb)   SpO2 98%   BMI 33.09 kg/m   Estimated body mass index is 33.09 kg/m  as calculated from the following:    Height as of 4/29/24: 1.676 m (5' 6\").    Weight as of this encounter: 93 kg (205 lb). Body surface area is 2.08 meters squared.  Worst Pain (10) Comment: Data Unavailable   No LMP recorded. Patient is postmenopausal.  Allergies reviewed: Yes  Medications reviewed: Yes    Medications: Medication refills not needed today.  Pharmacy name entered into EPIC:    [facility] - [place], [place] - [facility] [place], [facility]  [facility] - [facility], [place] - [address] [place] [address] [id number] - [place], [place] - [address] [facility] [place], [place] - [address] [place] [address] [phone]    Frailty Screening:   Is the patient here for a new oncology consult visit in cancer care? 2. No      Clinical concerns:        Monse West CMA              "

## 2024-06-13 NOTE — LETTER
6/13/2024      Jenn Aparicio  [address]  [place] [address]      Dear Colleague,    Thank you for referring your patient, Jenn Aparicio, to the [facility]CER [facility]. Please see a copy of my visit note below.        [facility]  [address] [address]  Phone: [phone]  Fax: [phone]    PATIENT NAME: Jenn Aparicio  MRN # [id number]   DATE OF VISIT: June 13, 2024  YOB: 1955     CANCER TYPE: Lung adenocarcinoma  STAGE: IA2 [id number] poorly diff adeno RLL, then [id number] IA2 suspected NSCLC RUL, medically inoperable      ECOG PS: 2 (COPD)     PD-L1: N/A  Lung panel: N/A  NGS: N/A     SUMMARY  12/24/15 PET/CT  1/13/15 CT lung bx. Adenocarcinoma  2/8/16 R thoracotomy, RLL lobectomy (Dr. Cunha). Adenocarcinoma, 1.1 cm, assoicated with atypical adenomatous hyperplasia  10/18/19 CTA for shortness of breath. New 1.3 cm RUL nodule  11/2019 PET/CT. 1.1 cm RUL hypermetabolic nodule (SUV 12.6)  12/26/19 Brain MRI negative for mets  1/14~1/28/20 SBRT to RUL nodule, 5000 cGy, every other day, 1000 cGy ([place]. Currie [place] [facility] [place] No bx due to O2 dependence and too much risk  8/19/20 First visit with me   11/29/21 CT chest. New 10 mm RUL nodule  1/11/22 CT chest. New 7.2 mm RUL nodule, unchanged RUL nodules  3/31/22 CT chest. New RUL nodule from Jan 2022 7-->10 mm, new 5 mm ROBERT nodule  5/20~5/24/22 [facility] for COPD exacerbation.   6/24/22 CT chest non contrast.   8/16/22 EGD. 3 cm hiatal hernia, o/w normal  8/31/22 CT chest. 2.5 x 1.3 cm R midlung subpleural nodularity (4:98) previously 2.3 x 1.1 cm, slightly increased solid component   10/5/22 PET/CT. 2 x 1.3 cm irregular opacity posterior RUL (SUV 2.46), 2.4 x 0.8 cm linear opacity (SUV 4.76); there was bronchiectasia on prior imaging. Stable 1.1 x 0.8 cm non FDG avid RUL opacity and unchanged 4 mm posterior RUL nodule. No adenopathy. Inferior right breast uptake (SUV 6.09) likely  infectious or inflammatory.   2/3~2/9/23 [facility] for COPD exacerbation  2/4/23 CTA during hospitalization. No significant change in 2.3 x 1.5 cm spiculated nodule in the right upper lobe (series 7 image 94) with surrounding linear parenchymal opacities and subtle nodularity  2/20~2/24/23 [facility] for COPD exacerbation.   3/1/23 CT chest non contrast. 2.3 cm posterior RUL irregular nodule unchanged from 12/1/22 however slightly increased soft tissue component compared to 10/5/22 PET. Stable ROBERT mucous plugging with micronodularity.  3/22/23 CT chest. Stable compared to 3/1/23  5/25/23 CT chest abd w/contrast. Unchanged 1.3 x 0.5 cm R apical nodule, unchnaged 2.4 x 1.3 cm R upper nodular consolidation. New nodular/masslike area of consolidation anteriorly and laterally measuring 3.3 x 1.4 cm, not present on CT 3/22/23. No adenopathy. Consider PET or close surveillance CT in 3 months  7/19/23 Bronch, EBUS (Dr. Garcia). 11L negative, scant cells. 4L unsatisfactory for eval. 8 negative, scant cellularity, 4R unsatisfactory, 11R negative, scan cellularity, RUL negative, showed acute inflammation, limited by scant cellularity.  5/15/24 CT CAP. Slightly enlarging RUL consolidation, several new indeterminate RML nodules, other stable nodules, new L2 lytic lesion  6/11/24 PET. Poor quality due to hyperglycemia     ASSESSMENT AND PLAN  NSCLC, adeno, RUL: See extensive prior notes. I think RUL looks stable from earlier this year - see prior discussions - definitely larger than in 2023 although it seems the FDG avidity is likely quite low, given the limitations of  the PET, which was to look at this as well as delineate the L2 lesion but unfortunately BG was too high to be that useful. However, I think it continues to look abnormal. Recommended MRI L spine. Reviewed plan to get bx, etc., if the MRI looks abnormal, and consider palliative radiotherapy to stave off problems in the future. Jenn is agreeable.      COPD: Remains O2 dependent  with exacerbations throughout the year, usually better in warmer months, 2L-3L at home. No change since our last visit. Continues to see Pulmonologist regularly.      DM2: Seeing Dr. Shabazz in Endocrine.    Lower back pain: MRI as above. Manageable currently.    Deconditioning: From last visit, getting a little worse over the last 6 months, but not in a position to do PT and not homebound and is stable since last visit.     Dysphagia: Met with Dr. Briseno in GI 7/18/23, has had pretty extensive evaluation before, last discussed in 2023.     Peripheral neuropathy: Mostly driven by DM    The longitudinal plan of care for the condition(s) below were addressed during this visit. Due to the added complexity in care, I will continue to support Jenn in the subsequent management of this condition(s) and with the ongoing continuity of care of this condition(s): adenocarcinoma of the lung       30 minutes spent by me on the date of the encounter doing chart review, review of outside records, review of test results, interpretation of tests, patient visit, documentation, orders    Zarina Leung MD  Associate Professor of Medicine  Hematology, Oncology and Transplantation      SUBJECTIVE  Jenn returns for follow up of lung adenocarcinoma after PET  No major changes since our last visit    PAST MEDICAL HISTORY  Lung adenocarcinoma  DM2 with neuropathy  HCV IA, undetectable in 2019, treated  COPD. O2 dependent since late 2018. PFT 11/26/19. FEV1 0.64 (30%), FVC 1.69 (63%), DLCOunc 9.8 (38%).  HTN  H/o ITP  H/o disk herniation  Ankle surgery  Median neuropathy R  H/o sessile colon polyps 2014, h/o adenomas before that. Colonoscopy 2/7/18 @ [facility] [place]. Sessile serated adenoma x 1, tubular adenoma x 3  H/o chronic LBP  Dyslipidemia  Cataracts    CURRENT OUTPATIENT MEDICATIONS  Reviewed    ALLERGIES  Allergies   Allergen Reactions    Aspirin      325mg     Bee Venom Anaphylaxis    Penicillins Hives    Azithromycin Dizziness    Colon Care      Interferons Dermatitis     PHYSICAL EXAM  BP (!) 153/75 (BP Location: Right arm, Patient Position: Sitting, Cuff Size: Adult Regular)   Pulse 91   Temp 98.1  F (36.7  C) (Oral)   Resp 16   Wt 93 kg (205 lb)   SpO2 98%   BMI 33.09 kg/m    GEN: NAD  HEENT: EOMI, no icterus, injection or pallor  EXT: no edema  NEURO: alert  SKIN: no rashes    LABORATORY AND IMAGING STUDIES    PET Oncology Whole Body  Narrative: Combined Report of: PET and CT on  6/11/2024 3:47 PM:    1. PET of the neck, chest, abdomen, and pelvis.  2. PET CT Fusion for Attenuation Correction and Anatomical  Localization:    3. 3D MIP and PET-CT fused images were processed on an independent  workstation and archived to PACS and reviewed by a radiologist.    Technique:    1. PET: The patient received 12.3 mCi of F-18-FDG; the serum glucose  was 256 mg/dL prior to administration, body weight was 93 kg. Images  were evaluated in the axial, sagittal, and coronal planes as well as  the rotational whole body MIP. Images were acquired from the Vertex to  the Feet.    UPTAKE WAS MEASURED AT 60 MINUTES.     BACKGROUND:  Liver SUV max= 4.4,   Aorta Blood SUV max= 2.2.     2. CT: CT only obtained for attenuation correction and not diagnostic  purposes.    INDICATION: lung adenocarcinoma, new L2 lesion, evaluate for mets;  Malignant neoplasm of lower lobe of right lung (H); Bone lesion    ADDITIONAL INFORMATION OBTAINED FROM EMR: 68-year-old woman with right  sided pulmonary adenocarcinoma. Status post right lower lobectomy and  SBRT to right upper lobe nodule. Suspicious osseous metastases versus  Schmorl's node at L2 noted on prior exam. PET scan for restaging.    COMPARISON: PET/CT 1/17/2024    FOLLOW-UP APPOINTMENT: 6/13/2024    FINDINGS:   There is generalized increased skeletal muscle uptake to inadequate  glucose control.    HEAD/NECK:  Intense diffuse uptake by oral tongue likely physiologic.  No other abnormal uptake within the  neck.    CHEST:  New hypermetabolic spiculated right apical lung nodule with SUV max of  3.1, measuring 0.8 x 1.0 cm. Series 4, image 103.    Interval decrease in size and uptake of patchy groundglass opacities  with septal thickening in the posterior right upper lobe subpleural  location. Currently having SUV max 2.4, previously 6.1.     Few scattered pulmonary nodules again seen in both upper lobes with  near complete resolution of previously seen hypermetabolic uptake  for  example:    -Left upper lobe nodule. Currently measures 0.6 cm previously  measuring 0.7 cm. Series 4, image 124.  -Left upper lobe nodule. Currently measuring 0.6 cm, previously  measuring 0.8 cm (series 4, image 125.  -Right upper lobe nodule, currently measuring 0.4 cm, previously  measuring 0.5 cm (4, image 124.    Calcified granuloma right middle lobe.    Emphysematous changes are seen in both lungs.    Mild coronary arterial calcifications.    ABDOMEN AND PELVIS:  No abnormal uptake.   Urachal diverticulum again noted.  Colonic diverticulosis.    EXTREMITIES:   No abnormal uptake.     BONES AND SOFT TISSUES:   There is mild uptake associating the lucent focus along the superior  endplate of L2 vertebral body having SUV max of 3.8.   Multilevel degenerative changes are seen in the spine.  Mild anterolisthesis of L4 over L5.  Impression: IMPRESSION: PET sensitivity is low in this study due to high muscular  uptake (due to high blood glucose). More strict PET prep is  recommended for the next PET CT (Last meal and insulin dose before  midnight, nothing after midnight and PET scheduled before noon)  In this patient with history of lung adenocarcinoma with multiple  pulmonary nodules and a suspicious osseous lesion at L2 vertebral  body, who presents for restaging exam:    1. Regarding the new lucent area along the superior endplate of L2  there is mild FDG uptake on PET. This continues  to be indeterminate  particularly given the low  sensitivity of PET, the low uptake could be  a false negative finding. Further evaluation with lumbar spine MRI is  recommended.     2. Previously seen two FDG avid pulmonary nodules in the left inferior  lingula are resolved. New hypermetabolic 10 x 8 mm spiculated nodule  in the right apical region. This could be infectious versus malignant.  Short interval follow-up or biopsy can be considered.        I have personally reviewed the examination and initial interpretation  and I agree with the findings.    RIANA VELASCO MD         SYSTEM ID:  [id number]     PET/CT was personally reviewed and interpreted by me as above                 Zarina Leung MD

## 2024-06-13 NOTE — PROGRESS NOTES
[facility]  [address] [place] [address]  Phone: [phone]  Fax: [phone]    PATIENT NAME: Jenn Aparicio  MRN # [id number]   DATE OF VISIT: June 13, 2024  YOB: 1955     CANCER TYPE: Lung adenocarcinoma  STAGE: IA2 [id number] poorly diff adeno RLL, then [id number] IA2 suspected NSCLC RUL, medically inoperable      ECOG PS: 2 (COPD)     PD-L1: N/A  Lung panel: N/A  NGS: N/A     SUMMARY  12/24/15 PET/CT  1/13/15 CT lung bx. Adenocarcinoma  2/8/16 R thoracotomy, RLL lobectomy (Dr. Cunha). Adenocarcinoma, 1.1 cm, assoicated with atypical adenomatous hyperplasia  10/18/19 CTA for shortness of breath. New 1.3 cm RUL nodule  11/2019 PET/CT. 1.1 cm RUL hypermetabolic nodule (SUV 12.6)  12/26/19 Brain MRI negative for mets  1/14~1/28/20 SBRT to RUL nodule, 5000 cGy, every other day, 1000 cGy (Dr. Currie at [facility] [place] No bx due to O2 dependence and too much risk  8/19/20 First visit with me   11/29/21 CT chest. New 10 mm RUL nodule  1/11/22 CT chest. New 7.2 mm RUL nodule, unchanged RUL nodules  3/31/22 CT chest. New RUL nodule from Jan 2022 7-->10 mm, new 5 mm ROBERT nodule  5/20~5/24/22 [facility] for COPD exacerbation.   6/24/22 CT chest non contrast.   8/16/22 EGD. 3 cm hiatal hernia, o/w normal  8/31/22 CT chest. 2.5 x 1.3 cm R midlung subpleural nodularity (4:98) previously 2.3 x 1.1 cm, slightly increased solid component   10/5/22 PET/CT. 2 x 1.3 cm irregular opacity posterior RUL (SUV 2.46), 2.4 x 0.8 cm linear opacity (SUV 4.76); there was bronchiectasia on prior imaging. Stable 1.1 x 0.8 cm non FDG avid RUL opacity and unchanged 4 mm posterior RUL nodule. No adenopathy. Inferior right breast uptake (SUV 6.09) likely infectious or inflammatory.   2/3~2/9/23 [facility] for COPD exacerbation  2/4/23 CTA during hospitalization. No significant change in 2.3 x 1.5 cm spiculated nodule in the right upper lobe (series 7 image 94) with surrounding linear parenchymal opacities  and subtle nodularity  2/20~2/24/23 [facility] for COPD exacerbation.   3/1/23 CT chest non contrast. 2.3 cm posterior RUL irregular nodule unchanged from 12/1/22 however slightly increased soft tissue component compared to 10/5/22 PET. Stable ROBERT mucous plugging with micronodularity.  3/22/23 CT chest. Stable compared to 3/1/23  5/25/23 CT chest abd w/contrast. Unchanged 1.3 x 0.5 cm R apical nodule, unchnaged 2.4 x 1.3 cm R upper nodular consolidation. New nodular/masslike area of consolidation anteriorly and laterally measuring 3.3 x 1.4 cm, not present on CT 3/22/23. No adenopathy. Consider PET or close surveillance CT in 3 months  7/19/23 Bronch, EBUS (Dr. Garcia). 11L negative, scant cells. 4L unsatisfactory for eval. 8 negative, scant cellularity, 4R unsatisfactory, 11R negative, scan cellularity, RUL negative, showed acute inflammation, limited by scant cellularity.  5/15/24 CT CAP. Slightly enlarging RUL consolidation, several new indeterminate RML nodules, other stable nodules, new L2 lytic lesion  6/11/24 PET. Poor quality due to hyperglycemia     ASSESSMENT AND PLAN  NSCLC, adeno, RUL: See extensive prior notes. I think RUL looks stable from earlier this year - see prior discussions - definitely larger than in 2023 although it seems the FDG avidity is likely quite low, given the limitations of  the PET, which was to look at this as well as delineate the L2 lesion but unfortunately BG was too high to be that useful. However, I think it continues to look abnormal. Recommended MRI L spine. Reviewed plan to get bx, etc., if the MRI looks abnormal, and consider palliative radiotherapy to stave off problems in the future. Jenn is agreeable.      COPD: Remains O2 dependent with exacerbations throughout the year, usually better in warmer months, 2L-3L at home. No change since our last visit. Continues to see Pulmonologist regularly.      DM2: Seeing Dr. Shabazz in Endocrine.    Lower back pain: MRI as above. Manageable  currently.    Deconditioning: From last visit, getting a little worse over the last 6 months, but not in a position to do PT and not homebound and is stable since last visit.     Dysphagia: Met with Dr. Briseno in GI 7/18/23, has had pretty extensive evaluation before, last discussed in 2023.     Peripheral neuropathy: Mostly driven by DM    The longitudinal plan of care for the condition(s) below were addressed during this visit. Due to the added complexity in care, I will continue to support Jenn in the subsequent management of this condition(s) and with the ongoing continuity of care of this condition(s): adenocarcinoma of the lung       30 minutes spent by me on the date of the encounter doing chart review, review of outside records, review of test results, interpretation of tests, patient visit, documentation, orders    Zarina Leung MD  Associate Professor of Medicine  Hematology, Oncology and Transplantation      SUBJECTIVE  Jenn returns for follow up of lung adenocarcinoma after PET  No major changes since our last visit    PAST MEDICAL HISTORY  Lung adenocarcinoma  DM2 with neuropathy  HCV IA, undetectable in 2019, treated  COPD. O2 dependent since late 2018. PFT 11/26/19. FEV1 0.64 (30%), FVC 1.69 (63%), DLCOunc 9.8 (38%).  HTN  H/o ITP  H/o disk herniation  Ankle surgery  Median neuropathy R  H/o sessile colon polyps 2014, h/o adenomas before that. [place] [facility] [place] Sessile serated adenoma x 1, tubular adenoma x 3  H/o chronic LBP  Dyslipidemia  Cataracts    CURRENT OUTPATIENT MEDICATIONS  Reviewed    ALLERGIES  Allergies   Allergen Reactions    Aspirin      325mg     Bee Venom Anaphylaxis    Penicillins Hives    Azithromycin Dizziness    Colon Care     Interferons Dermatitis     PHYSICAL EXAM  BP (!) 153/75 (BP Location: Right arm, Patient Position: Sitting, Cuff Size: Adult Regular)   Pulse 91   Temp 98.1  F (36.7  C) (Oral)   Resp 16   Wt 93 kg (205 lb)   SpO2 98%   BMI 33.09 kg/m     GEN: NAD  HEENT: EOMI, no icterus, injection or pallor  EXT: no edema  NEURO: alert  SKIN: no rashes    LABORATORY AND IMAGING STUDIES    PET Oncology Whole Body  Narrative: Combined Report of: PET and CT on  6/11/2024 3:47 PM:    1. PET of the neck, chest, abdomen, and pelvis.  2. PET CT Fusion for Attenuation Correction and Anatomical  Localization:    3. 3D MIP and PET-CT fused images were processed on an independent  workstation and archived to PACS and reviewed by a radiologist.    Technique:    1. PET: The patient received 12.3 mCi of F-18-FDG; the serum glucose  was 256 mg/dL prior to administration, body weight was 93 kg. Images  were evaluated in the axial, sagittal, and coronal planes as well as  the rotational whole body MIP. Images were acquired from the Vertex to  the Feet.    UPTAKE WAS MEASURED AT 60 MINUTES.     BACKGROUND:  Liver SUV max= 4.4,   Aorta Blood SUV max= 2.2.     2. CT: CT only obtained for attenuation correction and not diagnostic  purposes.    INDICATION: lung adenocarcinoma, new L2 lesion, evaluate for mets;  Malignant neoplasm of lower lobe of right lung (H); Bone lesion    ADDITIONAL INFORMATION OBTAINED FROM EMR: 68-year-old woman with right  sided pulmonary adenocarcinoma. Status post right lower lobectomy and  SBRT to right upper lobe nodule. Suspicious osseous metastases versus  Schmorl's node at L2 noted on prior exam. PET scan for restaging.    COMPARISON: PET/CT 1/17/2024    FOLLOW-UP APPOINTMENT: 6/13/2024    FINDINGS:   There is generalized increased skeletal muscle uptake to inadequate  glucose control.    HEAD/NECK:  Intense diffuse uptake by oral tongue likely physiologic.  No other abnormal uptake within the neck.    CHEST:  New hypermetabolic spiculated right apical lung nodule with SUV max of  3.1, measuring 0.8 x 1.0 cm. Series 4, image 103.    Interval decrease in size and uptake of patchy groundglass opacities  with septal thickening in the posterior right upper  lobe subpleural  location. Currently having SUV max 2.4, previously 6.1.     Few scattered pulmonary nodules again seen in both upper lobes with  near complete resolution of previously seen hypermetabolic uptake  for  example:    -Left upper lobe nodule. Currently measures 0.6 cm previously  measuring 0.7 cm. Series 4, image 124.  -Left upper lobe nodule. Currently measuring 0.6 cm, previously  measuring 0.8 cm (series 4, image 125.  -Right upper lobe nodule, currently measuring 0.4 cm, previously  measuring 0.5 cm (4, image 124.    Calcified granuloma right middle lobe.    Emphysematous changes are seen in both lungs.    Mild coronary arterial calcifications.    ABDOMEN AND PELVIS:  No abnormal uptake.   Urachal diverticulum again noted.  Colonic diverticulosis.    EXTREMITIES:   No abnormal uptake.     BONES AND SOFT TISSUES:   There is mild uptake associating the lucent focus along the superior  endplate of L2 vertebral body having SUV max of 3.8.   Multilevel degenerative changes are seen in the spine.  Mild anterolisthesis of L4 over L5.  Impression: IMPRESSION: PET sensitivity is low in this study due to high muscular  uptake (due to high blood glucose). More strict PET prep is  recommended for the next PET CT (Last meal and insulin dose before  midnight, nothing after midnight and PET scheduled before noon)  In this patient with history of lung adenocarcinoma with multiple  pulmonary nodules and a suspicious osseous lesion at L2 vertebral  body, who presents for restaging exam:    1. Regarding the new lucent area along the superior endplate of L2  there is mild FDG uptake on PET. This continues  to be indeterminate  particularly given the low sensitivity of PET, the low uptake could be  a false negative finding. Further evaluation with lumbar spine MRI is  recommended.     2. Previously seen two FDG avid pulmonary nodules in the left inferior  lingula are resolved. New hypermetabolic 10 x 8 mm spiculated  nodule  in the right apical region. This could be infectious versus malignant.  Short interval follow-up or biopsy can be considered.        I have personally reviewed the examination and initial interpretation  and I agree with the findings.    RIANA VELASCO MD         SYSTEM ID:  [id number]     PET/CT was personally reviewed and interpreted by me as above

## 2024-06-21 ENCOUNTER — TELEPHONE (OUTPATIENT)
Dept: PHARMACY | Facility: CLINIC | Age: 69
End: 2024-06-21
Payer: COMMERCIAL

## 2024-06-26 ENCOUNTER — VIRTUAL VISIT (OUTPATIENT)
Dept: PHARMACY | Facility: CLINIC | Age: 69
End: 2024-06-26
Payer: COMMERCIAL

## 2024-06-26 ENCOUNTER — ANCILLARY PROCEDURE (OUTPATIENT)
Dept: MRI IMAGING | Facility: CLINIC | Age: 69
End: 2024-06-26
Attending: INTERNAL MEDICINE
Payer: COMMERCIAL

## 2024-06-26 DIAGNOSIS — C79.51 LUNG CANCER METASTATIC TO BONE (H): ICD-10-CM

## 2024-06-26 DIAGNOSIS — E11.9 TYPE 2 DIABETES MELLITUS WITHOUT COMPLICATION, WITHOUT LONG-TERM CURRENT USE OF INSULIN (H): Primary | ICD-10-CM

## 2024-06-26 DIAGNOSIS — I10 HYPERTENSION, UNSPECIFIED TYPE: ICD-10-CM

## 2024-06-26 DIAGNOSIS — C34.90 LUNG CANCER METASTATIC TO BONE (H): ICD-10-CM

## 2024-06-26 DIAGNOSIS — C34.31 MALIGNANT NEOPLASM OF LOWER LOBE OF RIGHT LUNG (H): ICD-10-CM

## 2024-06-26 PROCEDURE — 72158 MRI LUMBAR SPINE W/O & W/DYE: CPT | Mod: GC | Performed by: RADIOLOGY

## 2024-06-26 PROCEDURE — A9585 GADOBUTROL INJECTION: HCPCS | Mod: JZ | Performed by: RADIOLOGY

## 2024-06-26 PROCEDURE — 99606 MTMS BY PHARM EST 15 MIN: CPT | Mod: 93 | Performed by: PHARMACIST

## 2024-06-26 RX ORDER — ACYCLOVIR 400 MG/1
TABLET ORAL
Qty: 3 EACH | Refills: 5 | Status: SHIPPED | OUTPATIENT
Start: 2024-06-26

## 2024-06-26 RX ORDER — TIRZEPATIDE 5 MG/.5ML
5 INJECTION, SOLUTION SUBCUTANEOUS
Qty: 2 ML | Refills: 3 | Status: SHIPPED | OUTPATIENT
Start: 2024-06-26 | End: 2024-07-09

## 2024-06-26 RX ORDER — GADOBUTROL 604.72 MG/ML
10 INJECTION INTRAVENOUS ONCE
Status: COMPLETED | OUTPATIENT
Start: 2024-06-26 | End: 2024-06-26

## 2024-06-26 RX ADMIN — GADOBUTROL 9 ML: 604.72 INJECTION INTRAVENOUS at 18:50

## 2024-06-26 NOTE — PROGRESS NOTES
Medication Therapy Management (MTM) Encounter    ASSESSMENT:                            Medication Adherence/Access: No issues identified    Diabetes:   Patient is meeting goal of > 70% time in target with continuous glucose monitoring.     Hypertension:   Unable to assess without more blood pressure readings. One isolated high reading. May need to increase losartan if still elevated at next visit with PCP      PLAN:                            If there are further issues with low blood glucose, may need to decrease Jardiance.     Follow-up: Return in about 3 months (around 9/26/2024) for Follow up with new MTM pharmacist.    SUBJECTIVE/OBJECTIVE:                          Jenn Aparicio is a 69 year old female seen for a follow-up visit.       Reason for visit: diabetes follow-up.    Allergies/ADRs: Reviewed in chart  Past Medical History: Reviewed in chart  Tobacco: She reports that she quit smoking about 17 months ago. Her smoking use included cigarettes. She has been exposed to tobacco smoke. She has never used smokeless tobacco.  Alcohol: none    Medication Adherence/Access: no issues reported    Diabetes   Jardiance 25 mg daily  Mounjaro 5 mg once weekly    Stopped the glipizide as it was causing hypoglycemia symptoms.     Metformin, developed diarrhea, has tried both extended release and short acting formulations      Blood sugar monitoring: Ranges (patient reported): highest has been 178 mg/dL. This morning before eating was 143 mg/dL. Reports that yesterday morning was 79 mg/dL. Time in range: 7 day: Average glucose 128 mg/dL. High 8% and 88% in range. 6% low and 1% very low.   14 days: Average glucose 146 mg/dL average. High 18% and 78% in range 2% low and 1% very low.     Some of these very lows may have been from the glipizide.        Eye exam is up to date  Foot exam is up to date    Hypertension   Losartan 50 mg daily      Patient reports the following medication side effects: none  [facility]  indicate cough with lisinopril   Has not been checking blood pressure at home        BP Readings from Last 3 Encounters:   06/13/24 (!) 153/75   05/27/24 120/68   05/16/24 132/76       Pulse Readings from Last 3 Encounters:   06/13/24 91   05/27/24 107   05/16/24 90                Today's Vitals: There were no vitals taken for this visit.  ----------------  Post Discharge Medication Reconciliation Status: discharge medications reconciled and changed, per note/orders.    I spent 12 minutes with this patient today. All changes were made via collaborative practice agreement with BENOIT Ruiz CNP. A copy of the visit note was provided to the patient's provider(s).    A summary of these recommendations was sent via [employer].    Samy Parsad, Pharm. D., BCACP  Medication Therapy Management Pharmacist      Telemedicine Visit Details  Type of service:  Telephone visit  Start Time:  1:59 pm  End Time:  2:11 pm     Medication Therapy Recommendations  No medication therapy recommendations to display

## 2024-06-26 NOTE — PATIENT INSTRUCTIONS
"Recommendations from today's MTM visit:                                                    MTM (medication therapy management) is a service provided by a clinical pharmacist designed to help you get the most of out of your medicines.   Today we reviewed what your medicines are for, how to know if they are working, that your medicines are safe and how to make your medicine regimen as easy as possible.    If there are further issues with low blood glucose, may need to decrease Jardiance.     Follow-up: Return in about 3 months (around 9/26/2024) for Follow up with new MTM pharmacist.    It was great speaking with you today.  I value your experience and would be very thankful for your time in providing feedback in our clinic survey. In the next few days, you may receive an email or text message from [facility] with a link to a survey related to your  clinical pharmacist.\"     To schedule another MTM appointment, please call the clinic directly or you may call the MTM scheduling line at [phone] or toll-free at [phone].     My Clinical Pharmacist's contact information:                                                      Please feel free to contact me with any questions or concerns you have.      Samy Prasad, Pharm. D., BCACP  Medication Therapy Management Pharmacist    "

## 2024-06-28 ENCOUNTER — PATIENT OUTREACH (OUTPATIENT)
Dept: ONCOLOGY | Facility: CLINIC | Age: 69
End: 2024-06-28
Payer: COMMERCIAL

## 2024-06-28 DIAGNOSIS — M89.9 BONE LESION: ICD-10-CM

## 2024-06-28 DIAGNOSIS — C34.31 MALIGNANT NEOPLASM OF LOWER LOBE OF RIGHT LUNG (H): Primary | ICD-10-CM

## 2024-06-28 NOTE — PROGRESS NOTES
[facility]: Cancer Care                                                                                          Contacted Jenn per the request of Dr. Leung to discuss L spine MRI results. The lesion in her spine that they discussed in her most recent visit looks concerning for cancer. We will have neuroradiology biopsy the site within the next couple of weeks.    Jenn is aware their schedulers will contact her to schedule the procedure. I encouraged her to reach out with any questions/concerns.    Ana Davey, RN, BSN  RN Care Coordinator  [facility]

## 2024-07-01 NOTE — CONSULTS
Outpatient IR Referral  07/01/24    Referring Provider: Dr. Zarina Leung  IR Referral Request: Lesion in L2 spine  Recommendations/Plan:  Neuro radiology approves CT guided biopsy of L2 lesion.    Surg path entered under referrer.     Patient should hold aspirin for 5 days.     Case and imaging was reviewed with Dr. Fabio Villanueva and Dr. Becker.   IR recommendations also relayed Epic messaging.    IR referral converted to procedure order.      Brief History:    Jenn Aparicio is a 69 year old female with history of lung adenocarcinoma, s/p right thoractomy and right LL lobectomy,  DMII, treated HCV, COPD (O2 dependent), HTN, hx of ITP, hx of disc herniation, chronic LBP, HL and on 5/15/24, patient had a CT CAP and was found to have slightly enlarging RUL consolidation, several new indeterminate RML nodules, stable nodules and a new L2 lytic lesion. An attempt was to assess PET, however it was poor quality due to hyperglycemia. Therefore, MRI was obtained to visualize lumbar spine. Request is for biopsy of of the presumed metastatic lesion within the superior third of the L2 vertebral body right of midline with collapse of the adjacent superior endplate.     Pertinent Medications:    Current Outpatient Medications   Medication Sig Dispense Refill    acetylcysteine (MUCOMYST) 10 % nebulizer solution Inhale 4 mLs into the lungs 4 times daily as needed for mucolysis/respiratory distress 30 mL 5    albuterol (PROAIR HFA/PROVENTIL HFA/VENTOLIN HFA) 108 (90 Base) MCG/ACT inhaler USE 1 OR 2 INHALATIONS EVERY 4 HOURS AS NEEDED 17 g 2    albuterol (PROVENTIL) (2.5 MG/3ML) 0.083% neb solution Take 1 vial (2.5 mg) by nebulization 4 times daily 360 mL 0    alpha-lipoic acid 600 MG capsule Take 1 capsule (600 mg) by mouth daily (Patient not taking: Reported on 6/13/2024) 90 capsule 3    aspirin 81 MG EC tablet Take 1 tablet (81 mg) by mouth every morning 90 tablet 3    atorvastatin (LIPITOR) 10 MG tablet Take 1 tablet (10 mg)  by mouth daily 90 tablet 3    blood glucose (ONETOUCH ULTRA) test strip       carboxymethylcellulose (REFRESH LIQUIGEL) 1 % ophthalmic solution Apply 1 drop to eye 4 times daily 15 mL 4    cetirizine (ZYRTEC) 10 MG tablet Take 1 tablet (10 mg) by mouth every morning 90 tablet 3    Continuous Blood Gluc  (DEXCOM G7 ) JOSEPHINE Use to read blood sugars as per 's instructions. 1 each 0    Continuous Blood Gluc Sensor (FREESTYLE GIOVANNI 2 SENSOR) MISC 1 each every 14 days For use with Freestyle Giovanni 2  for continuous monitioring of blood glucose levels. Replace sensor every 14 days. 2 each 11    Continuous Glucose Sensor (DEXCOM G7 SENSOR) MISC Change every 10 days. 3 each 5    cyanocobalamin (VITAMIN B-12) 500 MCG tablet Take 1 tablet (500 mcg) by mouth daily (Patient taking differently: Take 500 mcg by mouth every morning) 90 tablet 1    cyclobenzaprine (FLEXERIL) 5 MG tablet Take 1 tablet (5 mg) by mouth 3 times daily as needed for muscle spasms (Patient not taking: Reported on 6/13/2024) 30 tablet 1    DULoxetine (CYMBALTA) 30 MG capsule Take 1 capsule by mouth for one week and then increase to 2 capsules daily if tolerated. 60 capsule 1    empagliflozin (JARDIANCE) 25 MG TABS tablet Take 1 tablet (25 mg) by mouth every morning 90 tablet 3    EPINEPHrine (ANY BX GENERIC EQUIV) 0.3 MG/0.3ML injection 2-pack Inject 0.3 mLs (0.3 mg) into the muscle as needed for anaphylaxis (related to bee stings) May repeat one time in 5-15 minutes if response to initial dose is inadequate. (Patient not taking: Reported on 6/13/2024) 2 each 1    fluticasone (FLONASE) 50 MCG/ACT nasal spray Spray 1 spray into both nostrils 2 times daily Use at night before bed 15.8 mL 3    Fluticasone-Umeclidin-Vilanterol (TRELEGY ELLIPTA) 200-62.5-25 MCG/ACT oral inhaler USE 1 INHALATION DAILY 28 each 5    GLUCOSAMINE-CHONDROITIN -400 MG tablet TAKE 1 TABLET DAILY (Patient taking differently: Take 1 tablet by mouth  every evening) 90 tablet 3    ipratropium - albuterol 0.5 mg/2.5 mg/3 mL (DUONEB) 0.5-2.5 (3) MG/3ML neb solution Take 1 vial (3 mLs) by nebulization every 6 hours as needed for shortness of breath, wheezing or cough 90 mL 1    losartan (COZAAR) 50 MG tablet Take 1 tablet (50 mg) by mouth daily 90 tablet 3    omega-3 acid ethyl esters (LOVAZA) 1 g capsule Take 2 capsules (2 g) by mouth 2 times daily (Patient taking differently: Take 1 g by mouth 2 times daily Take 2 capsules (2 g) by mouth 2 times daily) 360 capsule 3    omeprazole (PRILOSEC) 20 MG DR capsule Take 20 mg by mouth      polyethylene glycol-propylene glycol (SYSTANE) 0.4-0.3 % SOLN ophthalmic solution Place 1 drop into both eyes 4 times daily for 90 days 5 mL 1    pregabalin (LYRICA) 150 MG capsule Take 1 capsule (150 mg) by mouth 2 times daily TAKE 1 CAPSULE(150 MG) BY MOUTH TWICE DAILY 180 capsule 0    roflumilast (DALIRESP) 500 MCG TABS tablet Take 1 tablet (500 mcg) by mouth every morning 90 tablet 3    STATIN NOT PRESCRIBED (INTENTIONAL) Please choose reason not prescribed from choices below. (Patient not taking: Reported on 6/13/2024)      tirzepatide (MOUNJARO) 5 MG/0.5ML pen Inject 5 mg Subcutaneous every 7 days 2 mL 3     No current facility-administered medications for this visit.       Pertinent Imaging Reviewed:    MR lumbar spine w/o and w/ contrast 6/26/24:  IMPRESSION:  1. Presumed metastatic lesion within the superior third of the L2  vertebral body right of midline with collapse of the adjacent superior  endplate. Transpedicular biopsy would likely be possible.  2. Mild to moderate multilevel lumbar spondylosis, most pronounced at  L4-L5. Moderate neural foraminal stenosis bilaterally at L3-L4 and on  the right at L4-L5. Mild spinal canal stenosis at L4-L5.  Most Recent Labs:  Lab Results   Component Value Date    WBC 8.7 05/15/2024    WBC 7.3 06/25/2021     Lab Results   Component Value Date    RBC 4.77 05/15/2024    RBC 4.61  "06/25/2021     Lab Results   Component Value Date    HGB 13.2 05/15/2024    HGB 12.9 06/25/2021     Lab Results   Component Value Date    HCT 42.2 05/15/2024    HCT 42.3 06/25/2021     Lab Results   Component Value Date     05/15/2024     06/25/2021    Last Comprehensive Metabolic Panel:  Lab Results   Component Value Date     05/15/2024    POTASSIUM 4.1 05/15/2024    CHLORIDE 106 05/15/2024    CO2 29 05/15/2024    ANIONGAP 9 05/15/2024     (H) 05/15/2024    BUN 6.8 (L) 05/15/2024    CR 0.56 05/15/2024    GFRESTIMATED >90 05/15/2024    LUIGI 8.9 05/15/2024      INR   Date Value Ref Range Status   02/03/2023 1.02 0.85 - 1.15 Final   02/23/2021 1.02 0.86 - 1.14 Final          If requesting team would like sample sent for anything else please use the IR order set \"IR RAD Biopsy or Fluid Aspirate Specimens\" to select your necessary diagnostic labs and pend for admission.     If there are labs you desire that are not found in this order set or you have questions regarding specific diagnostic labs please call the associated lab personnel.       Question Answer   What is the reason for the IR order request? Biopsy   What type of biopsy is needed? Bone   Which bone needs a biopsy? lesion in L2 spine   Patients clinical information/history? h/o lung adenocarcinoma s/p SBRT. New L2 lesion - PET avid and suspicious on MRI   Is this biopsy procedure for research? No   Specimen orders should be placed per site protocol Acknowledge   Call Back # Zarina Madhu [phone]   Is the patient on aspirin, Plavix or blood thinners? Yes - Patient may be asked to stop taking medications       Phyllis Lockwood PA-C  Interventional Radiology   [phone] (IR RN triage)    "

## 2024-07-09 ENCOUNTER — OFFICE VISIT (OUTPATIENT)
Dept: INTERNAL MEDICINE | Facility: CLINIC | Age: 69
End: 2024-07-09
Payer: COMMERCIAL

## 2024-07-09 ENCOUNTER — LAB (OUTPATIENT)
Dept: LAB | Facility: CLINIC | Age: 69
End: 2024-07-09
Payer: COMMERCIAL

## 2024-07-09 ENCOUNTER — PATIENT OUTREACH (OUTPATIENT)
Dept: ONCOLOGY | Facility: CLINIC | Age: 69
End: 2024-07-09

## 2024-07-09 VITALS
BODY MASS INDEX: 33.09 KG/M2 | HEART RATE: 96 BPM | SYSTOLIC BLOOD PRESSURE: 128 MMHG | DIASTOLIC BLOOD PRESSURE: 77 MMHG | OXYGEN SATURATION: 96 % | HEIGHT: 66 IN

## 2024-07-09 DIAGNOSIS — E11.65 TYPE 2 DIABETES MELLITUS WITH HYPERGLYCEMIA, WITHOUT LONG-TERM CURRENT USE OF INSULIN (H): ICD-10-CM

## 2024-07-09 DIAGNOSIS — K21.00 GASTROESOPHAGEAL REFLUX DISEASE WITH ESOPHAGITIS WITHOUT HEMORRHAGE: ICD-10-CM

## 2024-07-09 DIAGNOSIS — J44.1 COPD EXACERBATION (H): ICD-10-CM

## 2024-07-09 DIAGNOSIS — E11.42 DIABETIC POLYNEUROPATHY ASSOCIATED WITH TYPE 2 DIABETES MELLITUS (H): ICD-10-CM

## 2024-07-09 DIAGNOSIS — Z11.59 NEED FOR HEPATITIS C SCREENING TEST: Primary | ICD-10-CM

## 2024-07-09 DIAGNOSIS — J44.9 CHRONIC OBSTRUCTIVE PULMONARY DISEASE, UNSPECIFIED COPD TYPE (H): ICD-10-CM

## 2024-07-09 DIAGNOSIS — R10.2 PELVIC CRAMPING: ICD-10-CM

## 2024-07-09 DIAGNOSIS — E11.9 TYPE 2 DIABETES MELLITUS WITHOUT COMPLICATION, WITHOUT LONG-TERM CURRENT USE OF INSULIN (H): ICD-10-CM

## 2024-07-09 DIAGNOSIS — J30.2 SEASONAL ALLERGIC RHINITIS, UNSPECIFIED TRIGGER: ICD-10-CM

## 2024-07-09 DIAGNOSIS — C56.3 MALIGNANT NEOPLASM OF BILATERAL OVARIES (H): ICD-10-CM

## 2024-07-09 DIAGNOSIS — E11.42 DIABETIC PERIPHERAL NEUROPATHY (H): ICD-10-CM

## 2024-07-09 DIAGNOSIS — T63.441A BEE STING REACTION, ACCIDENTAL OR UNINTENTIONAL, INITIAL ENCOUNTER: ICD-10-CM

## 2024-07-09 DIAGNOSIS — E53.8 VITAMIN B12 DEFICIENCY (NON ANEMIC): ICD-10-CM

## 2024-07-09 DIAGNOSIS — E78.5 HYPERLIPIDEMIA, UNSPECIFIED HYPERLIPIDEMIA TYPE: ICD-10-CM

## 2024-07-09 DIAGNOSIS — I10 HYPERTENSION, UNSPECIFIED TYPE: ICD-10-CM

## 2024-07-09 DIAGNOSIS — H04.123 DRY EYES: ICD-10-CM

## 2024-07-09 LAB
ALBUMIN UR-MCNC: NEGATIVE MG/DL
APPEARANCE UR: CLEAR
BILIRUB UR QL STRIP: NEGATIVE
CANCER AG125 SERPL-ACNC: 12 U/ML
COLOR UR AUTO: ABNORMAL
GLUCOSE UR STRIP-MCNC: 300 MG/DL
HBA1C MFR BLD: 8.7 %
HGB UR QL STRIP: NEGATIVE
KETONES UR STRIP-MCNC: NEGATIVE MG/DL
LEUKOCYTE ESTERASE UR QL STRIP: NEGATIVE
MUCOUS THREADS #/AREA URNS LPF: PRESENT /LPF
NITRATE UR QL: NEGATIVE
PH UR STRIP: 5.5 [PH] (ref 5–7)
RBC URINE: 0 /HPF
SP GR UR STRIP: 1.03 (ref 1–1.03)
SQUAMOUS EPITHELIAL: 5 /HPF
UROBILINOGEN UR STRIP-MCNC: NORMAL MG/DL
WBC URINE: 0 /HPF

## 2024-07-09 PROCEDURE — 99215 OFFICE O/P EST HI 40 MIN: CPT | Performed by: NURSE PRACTITIONER

## 2024-07-09 PROCEDURE — 83036 HEMOGLOBIN GLYCOSYLATED A1C: CPT | Performed by: INTERNAL MEDICINE

## 2024-07-09 PROCEDURE — 99000 SPECIMEN HANDLING OFFICE-LAB: CPT | Performed by: PATHOLOGY

## 2024-07-09 PROCEDURE — 81001 URINALYSIS AUTO W/SCOPE: CPT | Performed by: PATHOLOGY

## 2024-07-09 PROCEDURE — 86304 IMMUNOASSAY TUMOR CA 125: CPT | Performed by: NURSE PRACTITIONER

## 2024-07-09 PROCEDURE — G2211 COMPLEX E/M VISIT ADD ON: HCPCS | Performed by: NURSE PRACTITIONER

## 2024-07-09 PROCEDURE — 36415 COLL VENOUS BLD VENIPUNCTURE: CPT | Performed by: PATHOLOGY

## 2024-07-09 RX ORDER — ALBUTEROL SULFATE 90 UG/1
AEROSOL, METERED RESPIRATORY (INHALATION)
Qty: 17 G | Refills: 11 | Status: SHIPPED | OUTPATIENT
Start: 2024-07-09

## 2024-07-09 RX ORDER — LOSARTAN POTASSIUM 50 MG/1
50 TABLET ORAL DAILY
Qty: 90 TABLET | Refills: 3 | Status: SHIPPED | OUTPATIENT
Start: 2024-07-09

## 2024-07-09 RX ORDER — OMEGA-3-ACID ETHYL ESTERS 1 G/1
CAPSULE, LIQUID FILLED ORAL
Qty: 360 CAPSULE | Refills: 3 | Status: SHIPPED | OUTPATIENT
Start: 2024-07-09

## 2024-07-09 RX ORDER — TRAMADOL HYDROCHLORIDE 50 MG/1
TABLET ORAL
COMMUNITY
Start: 2024-05-30 | End: 2024-07-09

## 2024-07-09 RX ORDER — EPINEPHRINE 0.3 MG/.3ML
0.3 INJECTION SUBCUTANEOUS PRN
Qty: 2 EACH | Refills: 1 | Status: SHIPPED | OUTPATIENT
Start: 2024-07-09

## 2024-07-09 RX ORDER — ALBUTEROL SULFATE 0.83 MG/ML
2.5 SOLUTION RESPIRATORY (INHALATION) 4 TIMES DAILY
Qty: 360 ML | Refills: 5 | Status: SHIPPED | OUTPATIENT
Start: 2024-07-09

## 2024-07-09 RX ORDER — ATORVASTATIN CALCIUM 10 MG/1
10 TABLET, FILM COATED ORAL DAILY
Qty: 90 TABLET | Refills: 3 | Status: SHIPPED | OUTPATIENT
Start: 2024-07-09

## 2024-07-09 RX ORDER — POLYETHYLENE GLYCOL 400 AND PROPYLENE GLYCOL 4; 3 MG/ML; MG/ML
1 SOLUTION/ DROPS OPHTHALMIC 4 TIMES DAILY
Qty: 5 ML | Refills: 11 | Status: SHIPPED | OUTPATIENT
Start: 2024-07-09

## 2024-07-09 RX ORDER — CETIRIZINE HYDROCHLORIDE 10 MG/1
10 TABLET ORAL EVERY MORNING
Qty: 90 TABLET | Refills: 3 | Status: SHIPPED | OUTPATIENT
Start: 2024-07-09

## 2024-07-09 RX ORDER — DULOXETIN HYDROCHLORIDE 30 MG/1
CAPSULE, DELAYED RELEASE ORAL
Qty: 180 CAPSULE | Refills: 3 | Status: SHIPPED | OUTPATIENT
Start: 2024-07-09 | End: 2024-08-26

## 2024-07-09 RX ORDER — FLUTICASONE PROPIONATE 50 MCG
1 SPRAY, SUSPENSION (ML) NASAL 2 TIMES DAILY
Qty: 15.8 ML | Refills: 3 | Status: SHIPPED | OUTPATIENT
Start: 2024-07-09

## 2024-07-09 RX ORDER — UREA 10 %
500 LOTION (ML) TOPICAL DAILY
Qty: 90 TABLET | Refills: 3 | Status: SHIPPED | OUTPATIENT
Start: 2024-07-09

## 2024-07-09 RX ORDER — TIRZEPATIDE 5 MG/.5ML
5 INJECTION, SOLUTION SUBCUTANEOUS
Qty: 6 ML | Refills: 3 | Status: SHIPPED | OUTPATIENT
Start: 2024-07-09

## 2024-07-09 RX ORDER — PREGABALIN 150 MG/1
150 CAPSULE ORAL 2 TIMES DAILY
Qty: 180 CAPSULE | Refills: 3 | Status: SHIPPED | OUTPATIENT
Start: 2024-07-09

## 2024-07-09 NOTE — PATIENT INSTRUCTIONS
Jesús Barajas,     The IR schedulers have tried reaching you to schedule your biopsy. Would you mind giving them a call to schedule? Their phone number is [phone].     Thank you,  Ana Davey, RN, BSN  RN Care Coordinator  [facility]

## 2024-07-09 NOTE — PROGRESS NOTES
Assessment & Plan     Chronic obstructive pulmonary disease, unspecified COPD type (H  - albuterol (PROAIR HFA/PROVENTIL HFA/VENTOLIN HFA) 108 (90 Base) MCG/ACT inhaler; USE 1 OR 2 INHALATIONS EVERY 4 HOURS AS NEEDED    Dry eyes  - polyethylene glycol-propylene glycol (SYSTANE) 0.4-0.3 % SOLN ophthalmic solution; Place 1 drop into both eyes 4 times daily  - carboxymethylcellulose (REFRESH LIQUIGEL) 1 % ophthalmic solution; Apply 1 drop to eye 4 times daily    Seasonal allergic rhinitis, unspecified trigger  - fluticasone (FLONASE) 50 MCG/ACT nasal spray; Spray 1 spray into both nostrils 2 times daily Use at night before bed  - cetirizine (ZYRTEC) 10 MG tablet; Take 1 tablet (10 mg) by mouth every morning    COPD exacerbation (H)  - albuterol (PROVENTIL) (2.5 MG/3ML) 0.083% neb solution; Take 1 vial (2.5 mg) by nebulization 4 times daily    Hyperlipidemia, unspecified hyperlipidemia type  - atorvastatin (LIPITOR) 10 MG tablet; Take 1 tablet (10 mg) by mouth daily    Vitamin B12 deficiency (non anemic)  - cyanocobalamin (VITAMIN B-12) 500 MCG tablet; Take 1 tablet (500 mcg) by mouth daily    Diabetic polyneuropathy associated with type 2 diabetes mellitus (H)  - DULoxetine (CYMBALTA) 30 MG capsule; Take 1 capsule twice a day.    Bee sting reaction  - EPINEPHrine (ANY BX GENERIC EQUIV) 0.3 MG/0.3ML injection 2-pack; Inject 0.3 mLs (0.3 mg) into the muscle as needed for anaphylaxis (related to bee stings) May repeat one time in 5-15 minutes if response to initial dose is inadequate.    Hypertension, unspecified type  - losartan (COZAAR) 50 MG tablet; Take 1 tablet (50 mg) by mouth daily    Type 2 diabetes mellitus without complication, without long-term current use of insulin (H)  - Hemoglobin A1c; Future  - omega-3 acid ethyl esters (LOVAZA) 1 g capsule; Take 2 capsules (2 g) by mouth 2 times daily  - tirzepatide (MOUNJARO) 5 MG/0.5ML pen; Inject 5 mg Subcutaneous every 7 days    Type 2 diabetes mellitus with  hyperglycemia, without long-term current use of insulin (H)  - pregabalin (LYRICA) 150 MG capsule; Take 1 capsule (150 mg) by mouth 2 times daily TAKE 1 CAPSULE(150 MG) BY MOUTH TWICE DAILY    Diabetic peripheral neuropathy (H)  - pregabalin (LYRICA) 150 MG capsule; Take 1 capsule (150 mg) by mouth 2 times daily TAKE 1 CAPSULE(150 MG) BY MOUTH TWICE DAILY    Pelvic cramping  - ; Future  - UA with Microscopic reflex to Culture - lab collect; Future    Gastroesophageal reflux disease with esophagitis without hemorrhage  - omeprazole (PRILOSEC) 20 MG DR capsule; Take 1 capsule (20 mg) by mouth daily    Results for orders placed or performed in visit on 07/09/24   Hemoglobin A1c     Status: Abnormal   Result Value Ref Range    Hemoglobin A1C 8.7 (H) <5.7 %        Status: Normal   Result Value Ref Range     12 <=38 U/mL    Narrative    This result is obtained using the Roche Elecsys  II method on the julisa [id number] immunoassay analyzer. Results obtained with different assay methods or kits cannot be used interchangeably.   UA with Microscopic reflex to Culture - lab collect     Status: Abnormal    Specimen: Urine, Midstream   Result Value Ref Range    Color Urine Light Yellow Colorless, Straw, Light Yellow, Yellow    Appearance Urine Clear Clear    Glucose Urine 300 (A) Negative mg/dL    Bilirubin Urine Negative Negative    Ketones Urine Negative Negative mg/dL    Specific Gravity Urine 1.030 1.003 - 1.035    Blood Urine Negative Negative    pH Urine 5.5 5.0 - 7.0    Protein Albumin Urine Negative Negative mg/dL    Urobilinogen Urine Normal Normal, 2.0 mg/dL    Nitrite Urine Negative Negative    Leukocyte Esterase Urine Negative Negative    Mucus Urine Present (A) None Seen /LPF    RBC Urine 0 <=2 /HPF    WBC Urine 0 <=5 /HPF    Squamous Epithelials Urine 5 (H) <=1 /HPF    Narrative    Urine Culture not indicated       RTC 4 mos or sooner prn.    The longitudinal plan of care for the  diagnosis(es)/condition(s) as documented were addressed during this visit. Due to the added complexity in care, I will continue to support Jenn in the subsequent management and with ongoing continuity of care.     I spent a total of  40 minutes in the care of this pt during today's office visit. This time includes reviewing the patient's chart and prior history, obtaining a history, performing an examination and evaluation and counseling the patient. This time also includes ordering medications or tests necessary in addition to communication to other member's of the patient's health care team. Time spent in documentation and care coordination is included.     Mitzi LABOY, CNP    Subjective   Jenn is a 69 year old, presenting for the following health issues:  Follow Up (Aching lower back, fell a while back and injured right foot, they think back pain might be cancer, wants A1C, abdominal cramping)      7/9/2024     8:15 AM   Additional Questions   Roomed by [facility] EMT     Jenn presents for a 3 month follow-up.  She needs medication refills.    She has been experiencing low back pain and was told it is concerning for possible L2 vertebral cancer and findings of osteoarthritis.She will be scheduled for a vertebral biopsy if she agrees. She is concerned about pain as she had significant discomfort after a steroid back injection in the past.     Her glucose meter averages:   7 day: 145  14 day 130  14 day 126  30 day 138  This is after some dosing adjustments of Mounjaro and Jardiance.See Endocrine note.    History of Present Illness       Back Pain:  She presents for follow up of back pain. Patient's back pain is a new problem.    Original cause of back pain: not sure  First noticed back pain: in the last week  Patient feels back pain: constantlyLocation of back pain:  Left lower back  Description of back pain: cramping, sharp and shooting  Back pain spreads: right thigh    Since patient first noticed back pain,  "pain is: gradually worsening  Does back pain interfere with her job:  Yes  On a scale of 1-10 (10 being the worst), patient describes pain as:  10  What makes back pain worse: bending, certain positions, lying down, sitting and twisting   Acupuncture: not tried  Acetaminophen: not helpful  Activity or exercise: not helpful  Chiropractor:  Not tried  Cold: not helpful  Other healthcare providers patient is seeing for back pain: None    She eats 0-1 servings of fruits and vegetables daily.She consumes 4 sweetened beverage(s) daily.She exercises with enough effort to increase her heart rate 9 or less minutes per day.  She exercises with enough effort to increase her heart rate 3 or less days per week.   She is taking medications regularly.                     Objective    /77 (BP Location: Right arm, Patient Position: Sitting, Cuff Size: Adult Large)   Pulse 96   Ht 1.676 m (5' 6\")   SpO2 96%   BMI 33.09 kg/m    Body mass index is 33.09 kg/m .  Physical Exam               Signed Electronically by: BENOIT Ruiz CNP    "

## 2024-07-09 NOTE — PROGRESS NOTES
[facility]    Clinical Data: Care Coordinator Outreach    Attempted to reach out to Jenn. She has not scheduled her biopsy yet. I did send her a reminder mychart last week that she read but did not reply to.     Left VM with IR scheduling phone number requesting she contact them to schedule. Provided my phone number in case she would like to discuss further.    Ana Davey, RN, BSN  RN Care Coordinator  [facility]

## 2024-07-10 ENCOUNTER — TELEPHONE (OUTPATIENT)
Dept: INTERNAL MEDICINE | Facility: CLINIC | Age: 69
End: 2024-07-10

## 2024-07-10 ENCOUNTER — VIRTUAL VISIT (OUTPATIENT)
Dept: ENDOCRINOLOGY | Facility: CLINIC | Age: 69
End: 2024-07-10
Payer: COMMERCIAL

## 2024-07-10 DIAGNOSIS — E11.9 TYPE 2 DIABETES MELLITUS WITHOUT COMPLICATION, WITHOUT LONG-TERM CURRENT USE OF INSULIN (H): ICD-10-CM

## 2024-07-10 DIAGNOSIS — H04.123 DRY EYES: ICD-10-CM

## 2024-07-10 PROCEDURE — 99214 OFFICE O/P EST MOD 30 MIN: CPT | Mod: 95 | Performed by: INTERNAL MEDICINE

## 2024-07-10 NOTE — TELEPHONE ENCOUNTER
Health Call Center    Phone Message    May a detailed message be left on voicemail: yes     Reason for Call: Other: They were referred from Jessica Mayo to start PCA services for her, would you be willing to follow the patient if they send over the Plan of Care and Orders?  Please call.       Action Taken: Message routed to:  [facility]    Travel Screening: Not Applicable     Date of Service:

## 2024-07-10 NOTE — TELEPHONE ENCOUNTER
Left message for [facility] that we would follow this patient's orders. Fax number and clinic number given to call back if they have questions and to fax orders to.  Gemma HUMPHREYS LPN  [facility]

## 2024-07-10 NOTE — RESULT ENCOUNTER NOTE
Dear Jenn,    Your average glucose for the past three months is up to 201. I see this was followed up with your Endocrinologist.    I did check a lab for ovarian cancer, which was negative. I am not sure what is causing your lower abdominal symptoms. If these continue please let us know so we can further evaluate.    Mitzi LABOY, CNP

## 2024-07-10 NOTE — PATIENT INSTRUCTIONS
-Continue current medication regimen without changes  -When CGM alerts to low glucose, confirm with fingerstick (especially if not experiencing symptoms suggestive of low glucose)  -We will plan to transfer care to the endocrinology clinic at the [facility] in [place], which is more accessible, to ensure CGM data can be downloaded and reviewed at visits (appointment with ANN in 3 months, endocrinologist in 6 months)  -We will communicate results via [facility], or if needed by phone

## 2024-07-10 NOTE — TELEPHONE ENCOUNTER
[facility] Call Center    Phone Message    May a detailed message be left on voicemail: yes     Reason for Call: Medication Question or concern regarding medication   Prescription Clarification  Name of Medication: carboxymethylcellulose (REFRESH LIQUIGEL) 1 % ophthalmic solution [44664]  Prescribing Provider: Mitzi Nettles APRN CNP   Pharm[id number] [place] [facility] [address]   What on the order needs clarification? Is the gel or solution to be dispensed as they are different  instruction clarification ...one eye of both        Action Taken: Other: pcc    Travel Screening: Not Applicable     Date of Service:

## 2024-07-10 NOTE — PROGRESS NOTES
ENDOCRINOLOGY VIDEO FOLLOW-UP        HISTORY OF PRESENT ILLNESS    Jenn Aparicio is seen in follow-up via a billable video visit.    Has followed with [facility] pharmacist in the interim since last visit.  At the time of our appointment in 2/2024, the patient had been on Trulicity.  However, this was discontinued by [facility] pharmacist in 3/2024 due to nausea.  Mounjaro was initiated.    My review of pharmacy notes also indicates that glipizide was prescribed but discontinued soon thereafter due to hypoglycemia.    Since Freestyle Marti was causing issues (not adhering to skin), she has been using Dexcom CGM.    Ms. Aparicio notes today that she is having less frequent hypoglycemia off glipizide.  However, she still sometimes has low glucose alerts with glucose values in the 50s (often occurs when she is asleep).  She is having 1-2 alerts each week.    Since patient uses a reader, we are not able to remotely download CGM data.  The patient is not able to come to [place] for CGM download either.  She is able to give me 3 day summary of CGM data: Average glucose 140.  TIR 85%, low 0%.    Current diabetes regimen:  -Jardiance 25 mg daily  -Mounjaro 5 mg weekly--there was 1 month lapse in treatment with Mounjaro when she was transitioning from 2.5 mg to 5 mg dose; she just took second dose of Mounjaro 5 mg weekly.  Has some nausea with eating.    [facility] pharmacist had discontinued atorvastatin and started ezetimibe in 1/2024 due to concern that myalgias might have been related to statin therapy.  Since the symptoms did not improve off statin, atorvastatin was reinitiated in 2/2024.    Pertinent endocrine and related history:  1.  Diabetes mellitus type 2.  -Ms. Aparicio is uncertain when she was diagnosed with diabetes: Progress Notes as far back as 2008 mention diagnosis of type 2 diabetes.  She previously was seen in endocrinology at [facility].    Past diabetes medications:  -Metformin, developed diarrhea, has tried both  extended release and short acting formulations  -Januvia appeared on medication list previously, does not recall side effect with this.  Treated with Ozempic, discontinued in 2023 due to nausea, headache and muscle cramping.  -Insulin, recalls developing hypoglycemia prompting discontinuation  2.  Diabetic peripheral neuropathy.    Her medical history is also notable for COPD (on home oxygen), hypertension, GERD with dysphagia, history of right lower lobe adenocarcinoma of the lung (post lobectomy in 2016) and right upper lobe non-small cell lung cancer (post radiation therapy in 2020).  A right upper lobe lung nodule has been noted to be increasing in size on chest CT.    PAST MEDICAL HISTORY  Past Medical History:   Diagnosis Date    Adenocarcinoma, lung (H)     Asthma     Ectopic pregnancy     Esophageal reflux     Pulmonary emphysema (H)     Very severe FEV1<30% predicted    Type II diabetes mellitus (H)        MEDICATIONS  Current Outpatient Medications   Medication Sig Dispense Refill    acetylcysteine (MUCOMYST) 10 % nebulizer solution Inhale 4 mLs into the lungs 4 times daily as needed for mucolysis/respiratory distress 30 mL 5    albuterol (PROAIR HFA/PROVENTIL HFA/VENTOLIN HFA) 108 (90 Base) MCG/ACT inhaler USE 1 OR 2 INHALATIONS EVERY 4 HOURS AS NEEDED 17 g 11    albuterol (PROVENTIL) (2.5 MG/3ML) 0.083% neb solution Take 1 vial (2.5 mg) by nebulization 4 times daily 360 mL 5    alpha-lipoic acid 600 MG capsule Take 1 capsule (600 mg) by mouth daily (Patient not taking: Reported on 6/13/2024) 90 capsule 3    aspirin 81 MG EC tablet Take 1 tablet (81 mg) by mouth every morning 90 tablet 3    atorvastatin (LIPITOR) 10 MG tablet Take 1 tablet (10 mg) by mouth daily 90 tablet 3    blood glucose (ONETOUCH ULTRA) test strip       carboxymethylcellulose (REFRESH LIQUIGEL) 1 % ophthalmic solution Apply 1 drop to eye 4 times daily 15 mL 11    cetirizine (ZYRTEC) 10 MG tablet Take 1 tablet (10 mg) by mouth every  morning 90 tablet 3    Continuous Blood Gluc  (DEXCOM G7 ) JOSEPHINE Use to read blood sugars as per 's instructions. 1 each 0    Continuous Blood Gluc Sensor (FREESTYLE GIOVANNI 2 SENSOR) MISC 1 each every 14 days For use with Freestyle Giovanni 2  for continuous monitioring of blood glucose levels. Replace sensor every 14 days. 2 each 11    Continuous Glucose Sensor (DEXCOM G7 SENSOR) MISC Change every 10 days. 3 each 5    cyanocobalamin (VITAMIN B-12) 500 MCG tablet Take 1 tablet (500 mcg) by mouth daily 90 tablet 3    cyclobenzaprine (FLEXERIL) 5 MG tablet Take 1 tablet (5 mg) by mouth 3 times daily as needed for muscle spasms (Patient not taking: Reported on 6/13/2024) 30 tablet 1    DULoxetine (CYMBALTA) 30 MG capsule Take 1 capsule twice a day. 180 capsule 3    empagliflozin (JARDIANCE) 25 MG TABS tablet Take 1 tablet (25 mg) by mouth every morning 90 tablet 3    EPINEPHrine (ANY BX GENERIC EQUIV) 0.3 MG/0.3ML injection 2-pack Inject 0.3 mLs (0.3 mg) into the muscle as needed for anaphylaxis (related to bee stings) May repeat one time in 5-15 minutes if response to initial dose is inadequate. 2 each 1    fluticasone (FLONASE) 50 MCG/ACT nasal spray Spray 1 spray into both nostrils 2 times daily Use at night before bed 15.8 mL 3    Fluticasone-Umeclidin-Vilanterol (TRELEGY ELLIPTA) 200-62.5-25 MCG/ACT oral inhaler USE 1 INHALATION DAILY 28 each 5    GLUCOSAMINE-CHONDROITIN -400 MG tablet TAKE 1 TABLET DAILY (Patient taking differently: Take 1 tablet by mouth every evening) 90 tablet 3    ipratropium - albuterol 0.5 mg/2.5 mg/3 mL (DUONEB) 0.5-2.5 (3) MG/3ML neb solution Take 1 vial (3 mLs) by nebulization every 6 hours as needed for shortness of breath, wheezing or cough 90 mL 1    losartan (COZAAR) 50 MG tablet Take 1 tablet (50 mg) by mouth daily 90 tablet 3    omega-3 acid ethyl esters (LOVAZA) 1 g capsule Take 2 capsules (2 g) by mouth 2 times daily 360 capsule 3     omeprazole (PRILOSEC) 20 MG DR capsule Take 1 capsule (20 mg) by mouth daily 90 capsule 3    polyethylene glycol-propylene glycol (SYSTANE) 0.4-0.3 % SOLN ophthalmic solution Place 1 drop into both eyes 4 times daily 5 mL 11    pregabalin (LYRICA) 150 MG capsule Take 1 capsule (150 mg) by mouth 2 times daily TAKE 1 CAPSULE(150 MG) BY MOUTH TWICE DAILY 180 capsule 3    roflumilast (DALIRESP) 500 MCG TABS tablet Take 1 tablet (500 mcg) by mouth every morning 90 tablet 3    STATIN NOT PRESCRIBED (INTENTIONAL) Please choose reason not prescribed from choices below. (Patient not taking: Reported on 6/13/2024)      tirzepatide (MOUNJARO) 5 MG/0.5ML pen Inject 5 mg Subcutaneous every 7 days 6 mL 3       Allergies, family, and social history were reviewed and documented as needed in EHR.     REVIEW OF SYSTEMS  A focused ROS was performed, with pertinent positives and negatives as noted in the HPI.    PHYSICAL EXAM  There were no vitals taken for this visit.  There is no height or weight on file to calculate BMI.  Constitutional: Patient is alert, oriented and appears in no acute distress.  Eyes: Eyes grossly normal to inspection, EOMI, no stare, lid lag, or retraction; no conjunctival injection.  ENMT: Lips are without lesions.   Neck: No visible goiter or neck mass.  Respiratory: Oxygen by nasal cannula.  No audible wheeze or cough. No visible cyanosis. No visible increased work of breathing.  Neurological: Alert and oriented times 3.  Cranial nerves grossly intact.      DATA REVIEW  Each of the following laboratory and/or imaging studies were reviewed.          ASSESSMENT  1.  Diabetes mellitus, type 2.  No CGM data, A1c is trending up.  However, she was without Mounjaro for 1 month and A1c may not be a complete reflection of glycemic control.  Her most recent CGM data suggests adequate diabetes control.  She is reporting intermittent low glucose alerts on CGM, although unclear if these are artifactual (she is not on  medications that would typically cause hypoglycemia).  I have asked patient to check fingerstick glucose to confirm low glucose alerts on CGM.  For now, would continue current regimen without changes.  In the long-term, would benefit from transferring care to the [facility] endocrinology clinic in [place] since patient is not able to visit [facility] for appointments or CGM downloads (and we are unable to download CGM remotely).     2.  Diabetes preventive care.  -Abnormal foot exam on 10/27/2023, foot care discussed at that visit, management of diabetic neuropathy as below  -Microalbuminuria persistent on labs in 8/2023; on SGLT2 inhibitor, her prior records from [facility] indicate cough with lisinopril, losartan was added by PCP in the interim since last visit  -Eye exam on 10/4/2023 showed no diabetic retinopathy; had follow-up eye exam at ophthalmology visit on 4/5/2024 in the [facility] system    3.  Diabetic neuropathy.  On Lyrica.  Also taking ALA supplement.  B12 level checked in 4/2023 was borderline and we started supplementation.  Follow-up level in 8/2023 was improved.    4.  Dyslipidemia.  We initiated statin therapy in 10/2023.  This was discontinued due to increasing muscle aches at her [facility] visit in 1/2024, now back on atorvastatin.  Continue current regimen without changes.    PLAN  -Continue current medication regimen without changes  -When CGM alerts to low glucose, confirm with fingerstick (especially if not experiencing symptoms suggestive of low glucose)  -We will plan to transfer care to the endocrinology clinic at the [facility] in [place], which is more accessible, to ensure CGM data can be downloaded and reviewed at visits (appointment with ANN in 3 months, endocrinologist in 6 months)  -We will communicate results via [employer], or if needed by phone    Video start time: 8:22 AM    Video end time: 8:39 AM    Physician location: On site    Video platform: [email or web address]    I spent a  total of 32 minutes on the date of encounter reviewing medical records, evaluating the patient, coordinating care and documenting in the EHR, as detailed above.      La Nena Shabazz MD   Division of Diabetes, Endocrinology and Metabolism  Department of Medicine

## 2024-07-10 NOTE — LETTER
7/10/2024      Jenn Aparicio  [address]  [place] [address]      Dear Colleague,    Thank you for referring your patient, Jenn Aparicio, to the [facility]. Please see a copy of my visit note below.      ENDOCRINOLOGY VIDEO FOLLOW-UP        HISTORY OF PRESENT ILLNESS    Jenn Aparicio is seen in follow-up via a billable video visit.    Has followed with [facility] pharmacist in the interim since last visit.  At the time of our appointment in 2/2024, the patient had been on Trulicity.  However, this was discontinued by [facility] pharmacist in 3/2024 due to nausea.  Mounjaro was initiated.    My review of pharmacy notes also indicates that glipizide was prescribed but discontinued soon thereafter due to hypoglycemia.    Since Freestyle Marti was causing issues (not adhering to skin), she has been using Dexcom CGM.    Ms. Aparicio notes today that she is having less frequent hypoglycemia off glipizide.  However, she still sometimes has low glucose alerts with glucose values in the 50s (often occurs when she is asleep).  She is having 1-2 alerts each week.    Since patient uses a reader, we are not able to remotely download CGM data.  The patient is not able to come to [place] for CGM download either.  She is able to give me 3 day summary of CGM data: Average glucose 140.  TIR 85%, low 0%.    Current diabetes regimen:  -Jardiance 25 mg daily  -Mounjaro 5 mg weekly--there was 1 month lapse in treatment with Mounjaro when she was transitioning from 2.5 mg to 5 mg dose; she just took second dose of Mounjaro 5 mg weekly.  Has some nausea with eating.    [facility] pharmacist had discontinued atorvastatin and started ezetimibe in 1/2024 due to concern that myalgias might have been related to statin therapy.  Since the symptoms did not improve off statin, atorvastatin was reinitiated in 2/2024.    Pertinent endocrine and related history:  1.  Diabetes mellitus type 2.  -Ms. Aparicio is uncertain when she was  diagnosed with diabetes: Progress Notes as far back as 2008 mention diagnosis of type 2 diabetes.  She previously was seen in endocrinology at [facility].    Past diabetes medications:  -Metformin, developed diarrhea, has tried both extended release and short acting formulations  -Alessiouvia appeared on medication list previously, does not recall side effect with this.  Treated with Ozempic, discontinued in 2023 due to nausea, headache and muscle cramping.  -Insulin, recalls developing hypoglycemia prompting discontinuation  2.  Diabetic peripheral neuropathy.    Her medical history is also notable for COPD (on home oxygen), hypertension, GERD with dysphagia, history of right lower lobe adenocarcinoma of the lung (post lobectomy in 2016) and right upper lobe non-small cell lung cancer (post radiation therapy in 2020).  A right upper lobe lung nodule has been noted to be increasing in size on chest CT.    PAST MEDICAL HISTORY  Past Medical History:   Diagnosis Date     Adenocarcinoma, lung (H)      Asthma      Ectopic pregnancy      Esophageal reflux      Pulmonary emphysema (H)     Very severe FEV1<30% predicted     Type II diabetes mellitus (H)        MEDICATIONS  Current Outpatient Medications   Medication Sig Dispense Refill     acetylcysteine (MUCOMYST) 10 % nebulizer solution Inhale 4 mLs into the lungs 4 times daily as needed for mucolysis/respiratory distress 30 mL 5     albuterol (PROAIR HFA/PROVENTIL HFA/VENTOLIN HFA) 108 (90 Base) MCG/ACT inhaler USE 1 OR 2 INHALATIONS EVERY 4 HOURS AS NEEDED 17 g 11     albuterol (PROVENTIL) (2.5 MG/3ML) 0.083% neb solution Take 1 vial (2.5 mg) by nebulization 4 times daily 360 mL 5     alpha-lipoic acid 600 MG capsule Take 1 capsule (600 mg) by mouth daily (Patient not taking: Reported on 6/13/2024) 90 capsule 3     aspirin 81 MG EC tablet Take 1 tablet (81 mg) by mouth every morning 90 tablet 3     atorvastatin (LIPITOR) 10 MG tablet Take 1 tablet (10 mg) by mouth  daily 90 tablet 3     blood glucose (ONETOUCH ULTRA) test strip        carboxymethylcellulose (REFRESH LIQUIGEL) 1 % ophthalmic solution Apply 1 drop to eye 4 times daily 15 mL 11     cetirizine (ZYRTEC) 10 MG tablet Take 1 tablet (10 mg) by mouth every morning 90 tablet 3     Continuous Blood Gluc  (DEXCOM G7 ) JOSEPHINE Use to read blood sugars as per 's instructions. 1 each 0     Continuous Blood Gluc Sensor (FREESTYLE GIOVANNI 2 SENSOR) MISC 1 each every 14 days For use with Freestyle Givoanni 2  for continuous monitioring of blood glucose levels. Replace sensor every 14 days. 2 each 11     Continuous Glucose Sensor (DEXCOM G7 SENSOR) MISC Change every 10 days. 3 each 5     cyanocobalamin (VITAMIN B-12) 500 MCG tablet Take 1 tablet (500 mcg) by mouth daily 90 tablet 3     cyclobenzaprine (FLEXERIL) 5 MG tablet Take 1 tablet (5 mg) by mouth 3 times daily as needed for muscle spasms (Patient not taking: Reported on 6/13/2024) 30 tablet 1     DULoxetine (CYMBALTA) 30 MG capsule Take 1 capsule twice a day. 180 capsule 3     empagliflozin (JARDIANCE) 25 MG TABS tablet Take 1 tablet (25 mg) by mouth every morning 90 tablet 3     EPINEPHrine (ANY BX GENERIC EQUIV) 0.3 MG/0.3ML injection 2-pack Inject 0.3 mLs (0.3 mg) into the muscle as needed for anaphylaxis (related to bee stings) May repeat one time in 5-15 minutes if response to initial dose is inadequate. 2 each 1     fluticasone (FLONASE) 50 MCG/ACT nasal spray Spray 1 spray into both nostrils 2 times daily Use at night before bed 15.8 mL 3     Fluticasone-Umeclidin-Vilanterol (TRELEGY ELLIPTA) 200-62.5-25 MCG/ACT oral inhaler USE 1 INHALATION DAILY 28 each 5     GLUCOSAMINE-CHONDROITIN -400 MG tablet TAKE 1 TABLET DAILY (Patient taking differently: Take 1 tablet by mouth every evening) 90 tablet 3     ipratropium - albuterol 0.5 mg/2.5 mg/3 mL (DUONEB) 0.5-2.5 (3) MG/3ML neb solution Take 1 vial (3 mLs) by nebulization every 6 hours  as needed for shortness of breath, wheezing or cough 90 mL 1     losartan (COZAAR) 50 MG tablet Take 1 tablet (50 mg) by mouth daily 90 tablet 3     omega-3 acid ethyl esters (LOVAZA) 1 g capsule Take 2 capsules (2 g) by mouth 2 times daily 360 capsule 3     omeprazole (PRILOSEC) 20 MG DR capsule Take 1 capsule (20 mg) by mouth daily 90 capsule 3     polyethylene glycol-propylene glycol (SYSTANE) 0.4-0.3 % SOLN ophthalmic solution Place 1 drop into both eyes 4 times daily 5 mL 11     pregabalin (LYRICA) 150 MG capsule Take 1 capsule (150 mg) by mouth 2 times daily TAKE 1 CAPSULE(150 MG) BY MOUTH TWICE DAILY 180 capsule 3     roflumilast (DALIRESP) 500 MCG TABS tablet Take 1 tablet (500 mcg) by mouth every morning 90 tablet 3     STATIN NOT PRESCRIBED (INTENTIONAL) Please choose reason not prescribed from choices below. (Patient not taking: Reported on 6/13/2024)       tirzepatide (MOUNJARO) 5 MG/0.5ML pen Inject 5 mg Subcutaneous every 7 days 6 mL 3       Allergies, family, and social history were reviewed and documented as needed in EHR.     REVIEW OF SYSTEMS  A focused ROS was performed, with pertinent positives and negatives as noted in the HPI.    PHYSICAL EXAM  There were no vitals taken for this visit.  There is no height or weight on file to calculate BMI.  Constitutional: Patient is alert, oriented and appears in no acute distress.  Eyes: Eyes grossly normal to inspection, EOMI, no stare, lid lag, or retraction; no conjunctival injection.  ENMT: Lips are without lesions.   Neck: No visible goiter or neck mass.  Respiratory: Oxygen by nasal cannula.  No audible wheeze or cough. No visible cyanosis. No visible increased work of breathing.  Neurological: Alert and oriented times 3.  Cranial nerves grossly intact.      DATA REVIEW  Each of the following laboratory and/or imaging studies were reviewed.          ASSESSMENT  1.  Diabetes mellitus, type 2.  No CGM data, A1c is trending up.  However, she was without  Mounjaro for 1 month and A1c may not be a complete reflection of glycemic control.  Her most recent CGM data suggests adequate diabetes control.  She is reporting intermittent low glucose alerts on CGM, although unclear if these are artifactual (she is not on medications that would typically cause hypoglycemia).  I have asked patient to check fingerstick glucose to confirm low glucose alerts on CGM.  For now, would continue current regimen without changes.  In the long-term, would benefit from transferring care to the [facility] endocrinology clinic in [place] since patient is not able to visit [facility] for appointments or CGM downloads (and we are unable to download CGM remotely).     2.  Diabetes preventive care.  -Abnormal foot exam on 10/27/2023, foot care discussed at that visit, management of diabetic neuropathy as below  -Microalbuminuria persistent on labs in 8/2023; on SGLT2 inhibitor, her prior records from [facility] indicate cough with lisinopril, losartan was added by PCP in the interim since last visit  -Eye exam on 10/4/2023 showed no diabetic retinopathy; had follow-up eye exam at ophthalmology visit on 4/5/2024 in the [facility] system    3.  Diabetic neuropathy.  On Lyrica.  Also taking ALA supplement.  B12 level checked in 4/2023 was borderline and we started supplementation.  Follow-up level in 8/2023 was improved.    4.  Dyslipidemia.  We initiated statin therapy in 10/2023.  This was discontinued due to increasing muscle aches at her [facility]TM visit in 1/2024, now back on atorvastatin.  Continue current regimen without changes.    PLAN  -Continue current medication regimen without changes  -When CGM alerts to low glucose, confirm with fingerstick (especially if not experiencing symptoms suggestive of low glucose)  -We will plan to transfer care to the endocrinology clinic at the [facility] in [place], which is more accessible, to ensure CGM data can be downloaded and reviewed at  visits (appointment with ANN in 3 months, endocrinologist in 6 months)  -We will communicate results via [employer], or if needed by phone    Video start time: 8:22 AM    Video end time: 8:39 AM    Physician location: On site    Video platform: Hitpost    I spent a total of 32 minutes on the date of encounter reviewing medical records, evaluating the patient, coordinating care and documenting in the EHR, as detailed above.      La Nena Shabazz MD   Division of Diabetes, Endocrinology and Metabolism  Department of Medicine    Again, thank you for allowing me to participate in the care of your patient.        Sincerely,        La Nena Shabazz MD

## 2024-07-11 ENCOUNTER — PATIENT OUTREACH (OUTPATIENT)
Dept: ONCOLOGY | Facility: CLINIC | Age: 69
End: 2024-07-11
Payer: COMMERCIAL

## 2024-07-11 ENCOUNTER — TELEPHONE (OUTPATIENT)
Dept: ENDOCRINOLOGY | Facility: CLINIC | Age: 69
End: 2024-07-11
Payer: COMMERCIAL

## 2024-07-11 NOTE — PROGRESS NOTES
"[facility]: Cancer Care                                                                                          Received message from Jenn's PCP that Jenn discussed that she would like sedation for her IR biopsy.     I contacted IR and confirmed that sedation will be provided - \"We offer moderate sedation for all CT biopsies.\"    Contacted Jenn to relay the above information. She would like IR to contact her to schedule her biopsy, so I sent their scheduling pool a message requesting they contact her. I did confirm that Jenn has their phone number and she should contact them if she does not hear from anyone within a day.    Ana Davey, RN, BSN  RN Care Coordinator  [facility]    "

## 2024-07-11 NOTE — TELEPHONE ENCOUNTER
Patient confirmed scheduled appointment:  Date: 10/15   Time: 3:30   Visit type: return diabetes   Provider: Maribel   Location: [facility]   Testing/imaging:   Additional notes:     Would you please help patient schedule the following appointments: she cannot come to [place] for visits and needs in-person visits at the [facility] for CGM download.   -ANN visit in 3 months with PA at the [facility] for follow-up on diabetes   -MD visit at the [facility] in 6 months for transfer of care (needs in person visits)     Please let me know if questions come up.     Felisha     Pt wanted to wait to schedule 6 month f/up     Rupa Jeff on 7/11/2024 at 12:12 PM

## 2024-07-15 ENCOUNTER — MYC MEDICAL ADVICE (OUTPATIENT)
Dept: INTERVENTIONAL RADIOLOGY/VASCULAR | Facility: CLINIC | Age: 69
End: 2024-07-15
Payer: COMMERCIAL

## 2024-07-22 ENCOUNTER — HOSPITAL ENCOUNTER (OUTPATIENT)
Dept: INTERVENTIONAL RADIOLOGY/VASCULAR | Facility: CLINIC | Age: 69
Discharge: HOME | End: 2024-07-22
Attending: INTERNAL MEDICINE | Admitting: INTERNAL MEDICINE
Payer: COMMERCIAL

## 2024-07-22 ENCOUNTER — APPOINTMENT (OUTPATIENT)
Dept: MEDSURG UNIT | Facility: CLINIC | Age: 69
End: 2024-07-22
Attending: INTERNAL MEDICINE
Payer: COMMERCIAL

## 2024-07-22 ENCOUNTER — HOSPITAL ENCOUNTER (OUTPATIENT)
Facility: CLINIC | Age: 69
Discharge: HOME | End: 2024-07-22
Attending: INTERNAL MEDICINE | Admitting: INTERNAL MEDICINE
Payer: COMMERCIAL

## 2024-07-22 VITALS
BODY MASS INDEX: 32.35 KG/M2 | OXYGEN SATURATION: 99 % | WEIGHT: 200.4 LBS | HEART RATE: 102 BPM | DIASTOLIC BLOOD PRESSURE: 53 MMHG | SYSTOLIC BLOOD PRESSURE: 127 MMHG | RESPIRATION RATE: 16 BRPM | TEMPERATURE: 98.3 F

## 2024-07-22 LAB
ERYTHROCYTE [DISTWIDTH] IN BLOOD BY AUTOMATED COUNT: 13.4 % (ref 10–15)
HCT VFR BLD AUTO: 43.3 % (ref 35–47)
HGB BLD-MCNC: 13.3 G/DL (ref 11.7–15.7)
INR PPP: 1.01 (ref 0.85–1.15)
MCH RBC QN AUTO: 27.5 PG (ref 26.5–33)
MCHC RBC AUTO-ENTMCNC: 30.7 G/DL (ref 31.5–36.5)
MCV RBC AUTO: 90 FL (ref 78–100)
PLATELET # BLD AUTO: 203 10E3/UL (ref 150–450)
RBC # BLD AUTO: 4.84 10E6/UL (ref 3.8–5.2)
WBC # BLD AUTO: 6.2 10E3/UL (ref 4–11)

## 2024-07-22 PROCEDURE — 250N000011 HC RX IP 250 OP 636: Performed by: RADIOLOGY

## 2024-07-22 PROCEDURE — 88311 DECALCIFY TISSUE: CPT | Mod: 26 | Performed by: PATHOLOGY

## 2024-07-22 PROCEDURE — 99152 MOD SED SAME PHYS/QHP 5/>YRS: CPT | Mod: GC | Performed by: RADIOLOGY

## 2024-07-22 PROCEDURE — 99153 MOD SED SAME PHYS/QHP EA: CPT

## 2024-07-22 PROCEDURE — 999N000142 HC STATISTIC PROCEDURE PREP ONLY

## 2024-07-22 PROCEDURE — 99152 MOD SED SAME PHYS/QHP 5/>YRS: CPT

## 2024-07-22 PROCEDURE — 20225 BONE BIOPSY TROCAR/NDL DEEP: CPT | Mod: GC | Performed by: RADIOLOGY

## 2024-07-22 PROCEDURE — 77012 CT SCAN FOR NEEDLE BIOPSY: CPT | Mod: 26 | Performed by: RADIOLOGY

## 2024-07-22 PROCEDURE — 96374 THER/PROPH/DIAG INJ IV PUSH: CPT | Mod: XU

## 2024-07-22 PROCEDURE — 258N000003 HC RX IP 258 OP 636: Performed by: NURSE PRACTITIONER

## 2024-07-22 PROCEDURE — 272N000155 HC KIT CR15

## 2024-07-22 PROCEDURE — 999N000133 HC STATISTIC PP CARE STAGE 2

## 2024-07-22 PROCEDURE — 88311 DECALCIFY TISSUE: CPT | Mod: TC | Performed by: INTERNAL MEDICINE

## 2024-07-22 PROCEDURE — 36415 COLL VENOUS BLD VENIPUNCTURE: CPT | Performed by: NURSE PRACTITIONER

## 2024-07-22 PROCEDURE — 85610 PROTHROMBIN TIME: CPT | Performed by: NURSE PRACTITIONER

## 2024-07-22 PROCEDURE — 85027 COMPLETE CBC AUTOMATED: CPT | Performed by: NURSE PRACTITIONER

## 2024-07-22 PROCEDURE — 88307 TISSUE EXAM BY PATHOLOGIST: CPT | Mod: 26 | Performed by: PATHOLOGY

## 2024-07-22 PROCEDURE — 250N000011 HC RX IP 250 OP 636: Performed by: STUDENT IN AN ORGANIZED HEALTH CARE EDUCATION/TRAINING PROGRAM

## 2024-07-22 RX ORDER — NALOXONE HYDROCHLORIDE 0.4 MG/ML
0.2 INJECTION, SOLUTION INTRAMUSCULAR; INTRAVENOUS; SUBCUTANEOUS
Status: DISCONTINUED | OUTPATIENT
Start: 2024-07-22 | End: 2024-07-23 | Stop reason: HOSPADM

## 2024-07-22 RX ORDER — ONDANSETRON 2 MG/ML
4 INJECTION INTRAMUSCULAR; INTRAVENOUS
Status: DISCONTINUED | OUTPATIENT
Start: 2024-07-22 | End: 2024-07-23 | Stop reason: HOSPADM

## 2024-07-22 RX ORDER — BUPIVACAINE HYDROCHLORIDE 5 MG/ML
0-30 INJECTION, SOLUTION EPIDURAL; INTRACAUDAL ONCE
Status: COMPLETED | OUTPATIENT
Start: 2024-07-22 | End: 2024-07-22

## 2024-07-22 RX ORDER — NALOXONE HYDROCHLORIDE 0.4 MG/ML
0.4 INJECTION, SOLUTION INTRAMUSCULAR; INTRAVENOUS; SUBCUTANEOUS
Status: DISCONTINUED | OUTPATIENT
Start: 2024-07-22 | End: 2024-07-23 | Stop reason: HOSPADM

## 2024-07-22 RX ORDER — FENTANYL CITRATE 50 UG/ML
25-50 INJECTION, SOLUTION INTRAMUSCULAR; INTRAVENOUS EVERY 5 MIN PRN
Status: DISCONTINUED | OUTPATIENT
Start: 2024-07-22 | End: 2024-07-23 | Stop reason: HOSPADM

## 2024-07-22 RX ORDER — FLUMAZENIL 0.1 MG/ML
0.2 INJECTION, SOLUTION INTRAVENOUS
Status: DISCONTINUED | OUTPATIENT
Start: 2024-07-22 | End: 2024-07-23 | Stop reason: HOSPADM

## 2024-07-22 RX ORDER — DIPHENHYDRAMINE HCL 25 MG
25 CAPSULE ORAL ONCE
Status: COMPLETED | OUTPATIENT
Start: 2024-07-22 | End: 2024-07-22

## 2024-07-22 RX ORDER — LIDOCAINE 40 MG/G
CREAM TOPICAL
Status: DISCONTINUED | OUTPATIENT
Start: 2024-07-22 | End: 2024-07-23 | Stop reason: HOSPADM

## 2024-07-22 RX ORDER — LIDOCAINE 40 MG/G
CREAM TOPICAL
Status: CANCELLED | OUTPATIENT
Start: 2024-07-22

## 2024-07-22 RX ORDER — DIPHENHYDRAMINE HYDROCHLORIDE 50 MG/ML
25-50 INJECTION INTRAMUSCULAR; INTRAVENOUS
Status: COMPLETED | OUTPATIENT
Start: 2024-07-22 | End: 2024-07-22

## 2024-07-22 RX ORDER — DIPHENHYDRAMINE HYDROCHLORIDE 50 MG/ML
25 INJECTION INTRAMUSCULAR; INTRAVENOUS ONCE
Status: COMPLETED | OUTPATIENT
Start: 2024-07-22 | End: 2024-07-22

## 2024-07-22 RX ORDER — SODIUM CHLORIDE 9 MG/ML
INJECTION, SOLUTION INTRAVENOUS CONTINUOUS
Status: DISCONTINUED | OUTPATIENT
Start: 2024-07-22 | End: 2024-07-23 | Stop reason: HOSPADM

## 2024-07-22 RX ADMIN — MIDAZOLAM 1 MG: 1 INJECTION INTRAMUSCULAR; INTRAVENOUS at 12:05

## 2024-07-22 RX ADMIN — SODIUM CHLORIDE: 9 INJECTION, SOLUTION INTRAVENOUS at 09:41

## 2024-07-22 RX ADMIN — FENTANYL CITRATE 25 MCG: 50 INJECTION, SOLUTION INTRAMUSCULAR; INTRAVENOUS at 12:36

## 2024-07-22 RX ADMIN — FENTANYL CITRATE 50 MCG: 50 INJECTION, SOLUTION INTRAMUSCULAR; INTRAVENOUS at 12:05

## 2024-07-22 RX ADMIN — FENTANYL CITRATE 50 MCG: 50 INJECTION, SOLUTION INTRAMUSCULAR; INTRAVENOUS at 12:51

## 2024-07-22 RX ADMIN — MIDAZOLAM 0.5 MG: 1 INJECTION INTRAMUSCULAR; INTRAVENOUS at 12:41

## 2024-07-22 RX ADMIN — FENTANYL CITRATE 25 MCG: 50 INJECTION, SOLUTION INTRAMUSCULAR; INTRAVENOUS at 12:21

## 2024-07-22 RX ADMIN — DIPHENHYDRAMINE HYDROCHLORIDE 25 MG: 50 INJECTION, SOLUTION INTRAMUSCULAR; INTRAVENOUS at 14:34

## 2024-07-22 RX ADMIN — DIPHENHYDRAMINE HYDROCHLORIDE 25 MG: 50 INJECTION INTRAMUSCULAR; INTRAVENOUS at 12:20

## 2024-07-22 RX ADMIN — MIDAZOLAM 0.5 MG: 1 INJECTION INTRAMUSCULAR; INTRAVENOUS at 12:36

## 2024-07-22 RX ADMIN — FENTANYL CITRATE 25 MCG: 50 INJECTION, SOLUTION INTRAMUSCULAR; INTRAVENOUS at 12:10

## 2024-07-22 RX ADMIN — FENTANYL CITRATE 25 MCG: 50 INJECTION, SOLUTION INTRAMUSCULAR; INTRAVENOUS at 12:39

## 2024-07-22 RX ADMIN — FENTANYL CITRATE 25 MCG: 50 INJECTION, SOLUTION INTRAMUSCULAR; INTRAVENOUS at 12:15

## 2024-07-22 RX ADMIN — MIDAZOLAM 0.5 MG: 1 INJECTION INTRAMUSCULAR; INTRAVENOUS at 12:16

## 2024-07-22 RX ADMIN — MIDAZOLAM 0.5 MG: 1 INJECTION INTRAMUSCULAR; INTRAVENOUS at 12:10

## 2024-07-22 RX ADMIN — MIDAZOLAM 0.5 MG: 1 INJECTION INTRAMUSCULAR; INTRAVENOUS at 12:21

## 2024-07-22 RX ADMIN — BUPIVACAINE HYDROCHLORIDE 10 ML: 5 INJECTION, SOLUTION EPIDURAL; INTRACAUDAL; PERINEURAL at 12:23

## 2024-07-22 RX ADMIN — FENTANYL CITRATE 25 MCG: 50 INJECTION, SOLUTION INTRAMUSCULAR; INTRAVENOUS at 12:44

## 2024-07-22 ASSESSMENT — ENCOUNTER SYMPTOMS
ALLERGIC/IMMUNOLOGIC NEGATIVE: 1
GASTROINTESTINAL NEGATIVE: 1
HEMATOLOGIC/LYMPHATIC NEGATIVE: 1
RESPIRATORY NEGATIVE: 1
PSYCHIATRIC NEGATIVE: 1
BACK PAIN: 1
EYES NEGATIVE: 1
CONSTITUTIONAL NEGATIVE: 1

## 2024-07-22 ASSESSMENT — ACTIVITIES OF DAILY LIVING (ADL)
ADLS_ACUITY_SCORE: 38

## 2024-07-22 NOTE — PROVIDER NOTIFICATION
JOHANN Kaur notified Dr. Becker about patient's generalized itching post procedure  Because of the sedation given during procedure and additional 25mg Benadryl, MD does not want to give additional benadryl at this time.  Informed patient, will continue to assess and notify team if itching does not recede or gets worse.

## 2024-07-22 NOTE — H&P
Jenn Aparicio is an 69 year old female.    Past Medical History:   Diagnosis Date    Adenocarcinoma, lung (H)     Asthma     Ectopic pregnancy     Esophageal reflux     Pulmonary emphysema (H)     Very severe FEV1<30% predicted    Type II diabetes mellitus (H)        Allergies:   Allergies   Allergen Reactions    Aspirin      325mg     Bee Venom Anaphylaxis    Penicillins Hives    Acetaminophen GI Disturbance    Azithromycin Dizziness    Colon Care     Interferons Dermatitis    Metformin GI Disturbance       Active Problems:    * No active hospital problems. *    Blood pressure 139/67, pulse 103, temperature 98.3  F (36.8  C), resp. rate 20, weight 90.9 kg (200 lb 6.4 oz), SpO2 100%, not currently breastfeeding.    Review of Systems   Constitutional: Negative.    HENT: Negative.     Eyes: Negative.    Respiratory: Negative.     Gastrointestinal: Negative.    Genitourinary: Negative.    Musculoskeletal:  Positive for back pain.   Skin: Negative.    Allergic/Immunologic: Negative.    Hematological: Negative.    Psychiatric/Behavioral: Negative.         Physical Exam  Vitals and nursing note reviewed. Exam conducted with a chaperone present.   HENT:      Head: Normocephalic.      Mouth/Throat:      Mouth: Mucous membranes are moist.   Eyes:      Pupils: Pupils are equal, round, and reactive to light.   Cardiovascular:      Rate and Rhythm: Normal rate.      Pulses: Normal pulses.   Pulmonary:      Effort: Pulmonary effort is normal.   Abdominal:      General: Abdomen is flat. Bowel sounds are normal.   Musculoskeletal:         General: Normal range of motion.      Cervical back: Normal range of motion.   Skin:     General: Skin is warm.      Capillary Refill: Capillary refill takes less than 2 seconds.   Neurological:      General: No focal deficit present.      Mental Status: She is alert.   Psychiatric:         Mood and Affect: Mood normal.         Assessment:  L2 vertebral body lesion      Plan:  CT guided biopsy of  L2 vertebral body lesion.    Jose Antonio López MD  7/22/2024

## 2024-07-22 NOTE — PROGRESS NOTES
Jenn returned from bone biopsy. Patient reporting 'itching all over'. VSS. She is rating lower back pain 10/10  2 hour bedrest ordered (1515)  Patient eating and drinking.  Will notify neuro rad about itching.

## 2024-07-22 NOTE — IR NOTE
Patient Name: Jenn Aparicio  Medical Record Number: [id number]  Today's Date: 7/22/2024    Procedure: L2 vertebral body biopsy of bone lesion  Proceduralist: Dr. Maribel Hutchinson   Pathology present: No    Procedure Start: 1211  Procedure end: 1304  Sedation medications administered: Midazolam 3.5 mg, Fentanyl 250 mcg, Benadryl 25 mg      Report given to: JOHANN Delgadillo 2A  : N/A    Other Notes: Pt arrived to IR room CT-2 from . Consent reviewed. Pt denies any questions or concerns regarding procedure. Pt positioned prone and monitored per protocol.     1 core obtained and sent to lab as ordered.    Primapore placed over biopsy needle track. Hemostasis achieved.    Patient to be on bedrest for 2 hours per Dr. Becker.    Pt tolerated procedure without any noted complications. Pt transferred back to .

## 2024-07-22 NOTE — PROGRESS NOTES
Jenn arrived on 2A for bone biopsy accompanied by her grandson.  PIV placed, labs sent, preprocedure questions complete.   per patient's machine.  Awaiting consent and lab results.

## 2024-07-22 NOTE — DISCHARGE INSTRUCTIONS
[facility]    Interventional Radiology  Patient Instructions Following Bone Biopsy    AFTER YOU GO HOME  If you were given sedation, for the first 24 hours: we recommend that an adult stay with you, DO NOT drive or operate machinery at home or at work, DO NOT smoke, DO NOT make any important or legal decisions.  DO NOT drink alcoholic beverages the day of your procedure  Drink plenty of fluids   Resume your regular diet, unless otherwise instructed by your Primary Physician  Relax and take it easy for 48 hours  DO NOT do any strenuous exercise or lifting (> 10 lbs) for at least 3 days following your procedure  Keep the dressing dry and in place for 24 hours. Replace with Band aid for 2 days.  Never leave a wet dressing in place.  Do not take a shower for at least 12 hours following your procedure. No tub bath, hot tub, or swimming for 5 days.  There should be minimum drainage from the biopsy site    CALL THE PHYSICIAN IF:  You start bleeding from the procedure site.  If you do start to bleed from that site, lie down flat and hold pressure on the site for a minimum of 10 minutes.  Your physician will tell you if you need to return to the hospital  You develop nausea or vomiting  You have excessive swelling, redness, or tenderness at the site  You have drainage that looks like it is infected.  You experience severe pain  You develop hives or a rash or unexplained itching  You develop shortness of breath  You develop a temperature of 101 degrees F or greater    [facility] INTERVENTIONAL RADIOLOGY DEPARTMENT  Procedure Physician: Dr. Becker, Dr. López                                      Date of procedure: July 22, 2024  Telephone Numbers: [phone]      Monday-Friday 7:30 am to 4:00 pm  [phone] After 4:00 pm Monday-Friday, Weekends & Holidays.   Ask for the Interventional Radiologist on call.  Someone is on call 24 hrs/day  [facility] toll free number: [phone] Monday-Friday 8:00 am to 4:30  pm  [facility] Emergency Dept: [phone]

## 2024-07-22 NOTE — PRE-PROCEDURE
GENERAL PRE-PROCEDURE:   Procedure:  CT guided bipsy of L2 vertebral body lesion  Date/Time:  7/22/2024 12:01 PM    Verbal consent obtained?: Yes    Risks and benefits: Risks, benefits and alternatives were discussed    DC Plan: Appropriate discharge home plan in place for patients who are going home after procedure   Consent given by:  Patient  Patient states understanding of procedure being performed: Yes    Patient's understanding of procedure matches consent: Yes    Procedure consent matches procedure scheduled: Yes    Expected level of sedation:  Moderate  Appropriately NPO:  Yes  ASA Class:  2  Mallampati  :  Grade 1- soft palate, uvula, tonsillar pillars, and posterior pharyngeal wall visible  Lungs:  Lungs clear with good breath sounds bilaterally  Heart:  Normal heart sounds and rate  History & Physical reviewed:  History and physical reviewed and no updates needed  Statement of review:  I have reviewed the lab findings, diagnostic data, medications, and the plan for sedation

## 2024-07-22 NOTE — PROGRESS NOTES
Re notified Neuro Rad about continued itching and ooze on dressing but that area is soft, no hematoma noted.    Orders for one time dose of IV benadryl  Bleeding not unexpected but continue to watch for uncontrolled bleeding and/or hematoma.

## 2024-07-22 NOTE — PROGRESS NOTES
D/I/A:  Patient is tolerating liquids and foods, ambulating, and voiding. Left lower back puncture site is stable( no bleeding since applying quick clot and tegaderm and no hematoma).CMS intact.  VSSA.  IV access removed.  Education completed and outlined in AVS  Questions answered prior to discharge.  Belongings returned to patient at discharge.    P: Discharged to home. Grandson accompanying patient. They will take medical transportation.

## 2024-07-29 ENCOUNTER — VIRTUAL VISIT (OUTPATIENT)
Dept: ONCOLOGY | Facility: CLINIC | Age: 69
End: 2024-07-29
Attending: INTERNAL MEDICINE
Payer: COMMERCIAL

## 2024-07-29 VITALS — HEIGHT: 66 IN | WEIGHT: 200 LBS | BODY MASS INDEX: 32.14 KG/M2

## 2024-07-29 DIAGNOSIS — G89.3 CANCER RELATED PAIN: ICD-10-CM

## 2024-07-29 DIAGNOSIS — M89.9 BONE LESION: Primary | ICD-10-CM

## 2024-07-29 DIAGNOSIS — K21.00 GASTROESOPHAGEAL REFLUX DISEASE WITH ESOPHAGITIS WITHOUT HEMORRHAGE: ICD-10-CM

## 2024-07-29 DIAGNOSIS — C34.31 MALIGNANT NEOPLASM OF LOWER LOBE OF RIGHT LUNG (H): Primary | ICD-10-CM

## 2024-07-29 PROCEDURE — G2211 COMPLEX E/M VISIT ADD ON: HCPCS | Mod: 95 | Performed by: INTERNAL MEDICINE

## 2024-07-29 PROCEDURE — 99214 OFFICE O/P EST MOD 30 MIN: CPT | Mod: 95 | Performed by: INTERNAL MEDICINE

## 2024-07-29 RX ORDER — OXYCODONE HYDROCHLORIDE 5 MG/1
5 TABLET ORAL EVERY 6 HOURS PRN
Qty: 10 TABLET | Refills: 0 | Status: SHIPPED | OUTPATIENT
Start: 2024-07-29 | End: 2024-08-01

## 2024-07-29 ASSESSMENT — PAIN SCALES - GENERAL: PAINLEVEL: WORST PAIN (10)

## 2024-07-29 NOTE — PROGRESS NOTES
[facility]  [address] [place] [id number]  Phone: [phone]  Fax: [phone]    PATIENT NAME: Jenn Aparicio  MRN # [id number]   DATE OF VISIT: July 29, 2024  YOB: 1955     CANCER TYPE: Lung adenocarcinoma  STAGE: IA2 [id number] poorly diff adeno RLL, then [id number] IA2 suspected NSCLC RUL, medically inoperable      ECOG PS: 2 (COPD)     PD-L1: N/A  Lung panel: N/A  NGS: N/A     SUMMARY  12/24/15 PET/CT  1/13/15 CT lung bx. Adenocarcinoma  2/8/16 R thoracotomy, RLL lobectomy (Dr. Cunha). Adenocarcinoma, 1.1 cm, assoicated with atypical adenomatous hyperplasia  10/18/19 CTA for shortness of breath. New 1.3 cm RUL nodule  11/2019 PET/CT. 1.1 cm RUL hypermetabolic nodule (SUV 12.6)  12/26/19 Brain MRI negative for mets  1/14~1/28/20 SBRT to RUL nodule, 5000 cGy, every other day, 1000 cGy (Dr. Currie at [facility] No bx due to O2 dependence and too much risk  8/19/20 First visit with me   11/29/21 CT chest. New 10 mm RUL nodule  1/11/22 CT chest. New 7.2 mm RUL nodule, unchanged RUL nodules  3/31/22 CT chest. New RUL nodule from Jan 2022 7-->10 mm, new 5 mm ROBERT nodule  5/20~5/24/22 [facility] for COPD exacerbation.   6/24/22 CT chest non contrast.   8/16/22 EGD. 3 cm hiatal hernia, o/w normal  8/31/22 CT chest. 2.5 x 1.3 cm R midlung subpleural nodularity (4:98) previously 2.3 x 1.1 cm, slightly increased solid component   10/5/22 PET/CT. 2 x 1.3 cm irregular opacity posterior RUL (SUV 2.46), 2.4 x 0.8 cm linear opacity (SUV 4.76); there was bronchiectasia on prior imaging. Stable 1.1 x 0.8 cm non FDG avid RUL opacity and unchanged 4 mm posterior RUL nodule. No adenopathy. Inferior right breast uptake (SUV 6.09) likely infectious or inflammatory.   2/3~2/9/23 [facility] for COPD exacerbation  2/4/23 CTA during hospitalization. No significant change in 2.3 x 1.5 cm spiculated nodule in the right upper lobe (series 7 image 94) with surrounding linear parenchymal opacities  and subtle nodularity  2/20~2/24/23 [facility] for COPD exacerbation.   3/1/23 CT chest non contrast. 2.3 cm posterior RUL irregular nodule unchanged from 12/1/22 however slightly increased soft tissue component compared to 10/5/22 PET. Stable ROBERT mucous plugging with micronodularity.  3/22/23 CT chest. Stable compared to 3/1/23  5/25/23 CT chest abd w/contrast. Unchanged 1.3 x 0.5 cm R apical nodule, unchnaged 2.4 x 1.3 cm R upper nodular consolidation. New nodular/masslike area of consolidation anteriorly and laterally measuring 3.3 x 1.4 cm, not present on CT 3/22/23. No adenopathy. Consider PET or close surveillance CT in 3 months  7/19/23 Bronch, EBUS (Dr. Garcia). 11L negative, scant cells. 4L unsatisfactory for eval. 8 negative, scant cellularity, 4R unsatisfactory, 11R negative, scan cellularity, RUL negative, showed acute inflammation, limited by scant cellularity.  5/15/24 CT CAP. Slightly enlarging RUL consolidation, several new indeterminate RML nodules, other stable nodules, new L2 lytic lesion  6/11/24 PET. Poor quality and low sensitivity due to hyperglycemia. New 0.8 x 1.0 cm R apical nodule (SUV 3.1), interval decreased size/uptake of patchy RUL GGO (SUV 2.4, previously 6.1). Few scattered nodules in both upper lobes with near complete resolution of FDG avidity, mild uptake associated with the lucent focus along the superior endplate of L2 (SUV 3.8).  6/26/24 MRI L spine. T1/2 hypointense, STIR hyperintense and enhancing lytic lesion L2 c/w mets. Mod neural foramenal steonosis bilaterally L3- and R L4-5.  7/22/24 L2 bx (IR). Path: negative for malignancy, showed fragments of benign bone and marrow tissue    ASSESSMENT AND PLAN  NSCLC, adeno, RUL, L2 lesion: See extensive prior notes but in summary, RUL stable this year, larger than in 2023, low FDG avidity, etc. Worrisome L2 lesion - bx negative for malignancy. Discussed sampling error vs true negative. Myeloma is also possible but not anemic, kidney  function stable, so lower on my ddx. Recommended restaging with CT CAP 3 months after the last MRI, which would be end of Sept. Will look at R apical nodule again as well    LBP: Ok to have very limited course of oxycodone, discussed expectation of no refills, continue acetaminophen regularly and ibuprofen sparingly.      COPD: O2 dependent and intermittent exacerbations. Continues to see Pulmonologist regularly. Not discussed today    Remainder of issues not actively discussed today  DM2: Seeing Dr. Shabazz in Endocrine  Deconditioning: From last visit, getting a little worse over the last 6 months, but not in a position to do PT and not homebound and is stable since last visit.  Dysphagia: Met with Dr. rBiseno in GI 7/18/23, has had pretty extensive evaluation before, last discussed in 2023.  Peripheral neuropathy: Mostly driven by DM    The longitudinal plan of care for the condition(s) below were addressed during this visit. Due to the added complexity in care, I will continue to support Jenn in the subsequent management of this condition(s) and with the ongoing continuity of care of this condition(s): adenocarcinoma of the lung       30 minutes spent by me on the date of the encounter doing chart review, review of test results, interpretation of tests, patient visit, documentation, orders    Zarina Leung MD  Associate Professor of Medicine  Hematology, Oncology and Transplantation    SUBJECTIVE  Jenn returns for follow up of lung adenocarcinoma after bone bx.   Some residual back pain - making it quite difficult to get anything done  Taking ibuprofen and acetaminophen, no relief, tried lidocaine patch, no effect  Some shooting pain down the leg, not unusual.   No numbness/tingling  Thinks maybe fentanyl caused some itching  No other changes since our last visit    PAST MEDICAL HISTORY  Lung adenocarcinoma  DM2 with neuropathy  HCV IA, undetectable in 2019, treated  COPD. O2 dependent since late 2018. PFT  "11/26/19. FEV1 0.64 (30%), FVC 1.69 (63%), DLCOunc 9.8 (38%).  HTN  H/o ITP  H/o disk herniation  Ankle surgery  Median neuropathy R  H/o sessile colon polyps 2014, h/o adenomas before that. Colonoscopy 2/7/18 @ [facility]. Sessile serated adenoma x 1, tubular adenoma x 3  H/o chronic LBP  Dyslipidemia  Cataracts    CURRENT OUTPATIENT MEDICATIONS  Reviewed    ALLERGIES  Allergies   Allergen Reactions    Aspirin      325mg     Bee Venom Anaphylaxis    Penicillins Hives    Acetaminophen GI Disturbance    Azithromycin Dizziness    Colon Care     Interferons Dermatitis    Metformin GI Disturbance      PHYSICAL EXAM  No vitals today  GEN: NAD  HEENT: EOMI, no icterus, injection or pallor  NEURO: alert  SKIN: no rashes    LABORATORY AND IMAGING STUDIES    Labs 7/22/24 were independently reviewed and interpreted by me  CBC ok - not anemic     Case Report   Surgical Pathology Report                         Case: [id number]                                   Authorizing Provider:  Zarina Leung MD         Collected:           07/22/2024 12:59 PM           Ordering Location:     [facility]     Received:            07/22/2024 01:23 PM                                  Interventional Radiology                                                     Pathologist:           Reji Zurita MD                                                           Specimen:    Spine, Lumbar, L2 lesion                                                                  Final Diagnosis   Bone, L2, biopsies:  - Fragments of benign bone and marrow tissue  - Negative for malignancy   Electronically signed by Reji Zurita MD on 7/26/2024 at  6:39 PM   Clinical Information  UULAB   70 y/o F with hx of lung ca and found to have an L2 lesion   Gross Description  UULAB   A(A). Spine, Lumbar, L2 lesion:  The specimen is received in formalin with proper patient identification, labeled \"L2 lesion\".  The specimen consists of 2 portions of pink-tan bone, 0.2 " and 0.5 cm in greatest dimension.  The specimen is submitted entirely as follows:  Cassette summary:  A1: Softened portion of bone, no decalcification  A2: Remainder of bone, following decalcification     Microscopic Description  UPRIYANKA   Microscopic description is performed      Performing Labs  UULAB   The technical component of this testing was completed at [facility].     Stain controls for all stains resulted within this report have been reviewed and show appropriate reactivity.

## 2024-07-29 NOTE — NURSING NOTE
Patient confirms medications and allergies are accurate via patients echeck in completion, and or denies any changes since last reviewed/verified.     Abigail Ty, Virtual Facilitator  Current patient location: [address] [address]    Is the patient currently in the state of [place]? YES    Visit mode:VIDEO    If the visit is dropped, the patient can be reconnected by: VIDEO VISIT: Text to cell phone:   Telephone Information:   Mobile [phone]       Will anyone else be joining the visit? NO  (If patient encounters technical issues they should call [phone] :[phone])    How would you like to obtain your AVS? MyChart    Are changes needed to the allergy or medication list? No    Are refills needed on medications prescribed by this physician? NO    Reason for visit: RECHECK    Abigail Ty VVF

## 2024-07-29 NOTE — LETTER
7/29/2024      Jenn Aparicio  [address]  [place] [address]      Dear Colleague,    Thank you for referring your patient, Jenn Aparicio, to the [facility]. Please see a copy of my visit note below.        [facility]  [address] [address]  Phone: [phone]  Fax: [phone]    PATIENT NAME: Jenn Aparicio  MRN # [id number]   DATE OF VISIT: July 29, 2024  YOB: 1955     CANCER TYPE: Lung adenocarcinoma  STAGE: IA2 [id number] poorly diff adeno RLL, then [id number] IA2 suspected NSCLC RUL, medically inoperable      ECOG PS: 2 (COPD)     PD-L1: N/A  Lung panel: N/A  NGS: N/A     SUMMARY  12/24/15 PET/CT  1/13/15 CT lung bx. Adenocarcinoma  2/8/16 R thoracotomy, RLL lobectomy (Dr. Cunha). Adenocarcinoma, 1.1 cm, assoicated with atypical adenomatous hyperplasia  10/18/19 CTA for shortness of breath. New 1.3 cm RUL nodule  11/2019 PET/CT. 1.1 cm RUL hypermetabolic nodule (SUV 12.6)  12/26/19 Brain MRI negative for mets  1/14~1/28/20 SBRT to RUL nodule, 5000 cGy, every other day, 1000 cGy (Dr. Currie at [facility] [place]). No bx due to O2 dependence and too much risk  8/19/20 First visit with me   11/29/21 CT chest. New 10 mm RUL nodule  1/11/22 CT chest. New 7.2 mm RUL nodule, unchanged RUL nodules  3/31/22 CT chest. New RUL nodule from Jan 2022 7-->10 mm, new 5 mm ROBERT nodule  5/20~5/24/22 [facility] for COPD exacerbation.   6/24/22 CT chest non contrast.   8/16/22 EGD. 3 cm hiatal hernia, o/w normal  8/31/22 CT chest. 2.5 x 1.3 cm R midlung subpleural nodularity (4:98) previously 2.3 x 1.1 cm, slightly increased solid component   10/5/22 PET/CT. 2 x 1.3 cm irregular opacity posterior RUL (SUV 2.46), 2.4 x 0.8 cm linear opacity (SUV 4.76); there was bronchiectasia on prior imaging. Stable 1.1 x 0.8 cm non FDG avid RUL opacity and unchanged 4 mm posterior RUL nodule. No adenopathy. Inferior right breast uptake (SUV 6.09) likely  infectious or inflammatory.   2/3~2/9/23 [facility] for COPD exacerbation  2/4/23 CTA during hospitalization. No significant change in 2.3 x 1.5 cm spiculated nodule in the right upper lobe (series 7 image 94) with surrounding linear parenchymal opacities and subtle nodularity  2/20~2/24/23 [facility] for COPD exacerbation.   3/1/23 CT chest non contrast. 2.3 cm posterior RUL irregular nodule unchanged from 12/1/22 however slightly increased soft tissue component compared to 10/5/22 PET. Stable ROBERT mucous plugging with micronodularity.  3/22/23 CT chest. Stable compared to 3/1/23  5/25/23 CT chest abd w/contrast. Unchanged 1.3 x 0.5 cm R apical nodule, unchnaged 2.4 x 1.3 cm R upper nodular consolidation. New nodular/masslike area of consolidation anteriorly and laterally measuring 3.3 x 1.4 cm, not present on CT 3/22/23. No adenopathy. Consider PET or close surveillance CT in 3 months  7/19/23 Bronch, EBUS (Dr. Garcia). 11L negative, scant cells. 4L unsatisfactory for eval. 8 negative, scant cellularity, 4R unsatisfactory, 11R negative, scan cellularity, RUL negative, showed acute inflammation, limited by scant cellularity.  5/15/24 CT CAP. Slightly enlarging RUL consolidation, several new indeterminate RML nodules, other stable nodules, new L2 lytic lesion  6/11/24 PET. Poor quality and low sensitivity due to hyperglycemia. New 0.8 x 1.0 cm R apical nodule (SUV 3.1), interval decreased size/uptake of patchy RUL GGO (SUV 2.4, previously 6.1). Few scattered nodules in both upper lobes with near complete resolution of FDG avidity, mild uptake associated with the lucent focus along the superior endplate of L2 (SUV 3.8).  6/26/24 MRI L spine. T1/2 hypointense, STIR hyperintense and enhancing lytic lesion L2 c/w mets. Mod neural foramenal steonosis bilaterally L3- and R L4-5.  7/22/24 L2 bx (IR). Path: negative for malignancy, showed fragments of benign bone and marrow tissue    ASSESSMENT AND PLAN  NSCLC, adeno, RUL, L2 lesion: See  extensive prior notes but in summary, RUL stable this year, larger than in 2023, low FDG avidity, etc. Worrisome L2 lesion - bx negative for malignancy. Discussed sampling error vs true negative. Myeloma is also possible but not anemic, kidney function stable, so lower on my ddx. Recommended restaging with CT CAP 3 months after the last MRI, which would be end of Sept. Will look at R apical nodule again as well    LBP: Ok to have very limited course of oxycodone, discussed expectation of no refills, continue acetaminophen regularly and ibuprofen sparingly.      COPD: O2 dependent and intermittent exacerbations. Continues to see Pulmonologist regularly. Not discussed today    Remainder of issues not actively discussed today  DM2: Seeing Dr. Shabazz in Endocrine  Deconditioning: From last visit, getting a little worse over the last 6 months, but not in a position to do PT and not homebound and is stable since last visit.  Dysphagia: Met with Dr. Briseno in GI 7/18/23, has had pretty extensive evaluation before, last discussed in 2023.  Peripheral neuropathy: Mostly driven by DM    The longitudinal plan of care for the condition(s) below were addressed during this visit. Due to the added complexity in care, I will continue to support Jenn in the subsequent management of this condition(s) and with the ongoing continuity of care of this condition(s): adenocarcinoma of the lung       30 minutes spent by me on the date of the encounter doing chart review, review of test results, interpretation of tests, patient visit, documentation, orders    Zarina Leung MD  Associate Professor of Medicine  Hematology, Oncology and Transplantation    SUBJECTIVE  Jenn returns for follow up of lung adenocarcinoma after bone bx.   Some residual back pain - making it quite difficult to get anything done  Taking ibuprofen and acetaminophen, no relief, tried lidocaine patch, no effect  Some shooting pain down the leg, not unusual.   No  numbness/tingling  Thinks maybe fentanyl caused some itching  No other changes since our last visit    PAST MEDICAL HISTORY  Lung adenocarcinoma  DM2 with neuropathy  HCV IA, undetectable in 2019, treated  COPD. O2 dependent since late 2018. PFT 11/26/19. FEV1 0.64 (30%), FVC 1.69 (63%), DLCOunc 9.8 (38%).  HTN  H/o ITP  H/o disk herniation  Ankle surgery  Median neuropathy R  H/o sessile colon polyps 2014, h/o adenomas before that. [place] [facility] [place] Sessile serated adenoma x 1, tubular adenoma x 3  H/o chronic LBP  Dyslipidemia  Cataracts    CURRENT OUTPATIENT MEDICATIONS  Reviewed    ALLERGIES  Allergies   Allergen Reactions     Aspirin      325mg      Bee Venom Anaphylaxis     Penicillins Hives     Acetaminophen GI Disturbance     Azithromycin Dizziness     Colon Care      Interferons Dermatitis     Metformin GI Disturbance      PHYSICAL EXAM  No vitals today  GEN: NAD  HEENT: EOMI, no icterus, injection or pallor  NEURO: alert  SKIN: no rashes    LABORATORY AND IMAGING STUDIES    Labs 7/22/24 were independently reviewed and interpreted by me  CBC ok - not anemic     Case Report   Surgical Pathology Report                         Case: [id number]                                   Authorizing Provider:  Zarina Leung MD         Collected:           07/22/2024 12:59 PM           Ordering Location:     [facility]     Received:            07/22/2024 01:23 PM                                  Interventional Radiology                                                     Pathologist:           Reji Zurita MD                                                           Specimen:    Spine, Lumbar, L2 lesion                                                                  Final Diagnosis   Bone, L2, biopsies:  - Fragments of benign bone and marrow tissue  - Negative for malignancy   Electronically signed by Reji Zurita MD on 7/26/2024 at  6:39 PM   Clinical Information  UULAB   70 y/o F with hx of  "lung ca and found to have an L2 lesion   Gross Description  UULAB   A(A). Spine, Lumbar, L2 lesion:  The specimen is received in formalin with proper patient identification, labeled \"L2 lesion\".  The specimen consists of 2 portions of pink-tan bone, 0.2 and 0.5 cm in greatest dimension.  The specimen is submitted entirely as follows:  Cassette summary:  A1: Softened portion of bone, no decalcification  A2: Remainder of bone, following decalcification     Microscopic Description  UUMASILVIA   Microscopic description is performed      Performing Labs  UULAB   The technical component of this testing was completed at [facility].     Stain controls for all stains resulted within this report have been reviewed and show appropriate reactivity.                 Again, thank you for allowing me to participate in the care of your patient.        Sincerely,        Zarina Leung MD  "

## 2024-08-14 ENCOUNTER — HOSPITAL ENCOUNTER (INPATIENT)
Facility: CLINIC | Age: 69
LOS: 12 days | Discharge: NURSING, REHAB OR LONG-TERM CARE | DRG: 551 | End: 2024-08-26
Attending: STUDENT IN AN ORGANIZED HEALTH CARE EDUCATION/TRAINING PROGRAM | Admitting: STUDENT IN AN ORGANIZED HEALTH CARE EDUCATION/TRAINING PROGRAM
Payer: COMMERCIAL

## 2024-08-14 ENCOUNTER — APPOINTMENT (OUTPATIENT)
Dept: GENERAL RADIOLOGY | Facility: CLINIC | Age: 69
DRG: 551 | End: 2024-08-14
Attending: STUDENT IN AN ORGANIZED HEALTH CARE EDUCATION/TRAINING PROGRAM
Payer: COMMERCIAL

## 2024-08-14 ENCOUNTER — APPOINTMENT (OUTPATIENT)
Dept: ULTRASOUND IMAGING | Facility: CLINIC | Age: 69
DRG: 551 | End: 2024-08-14
Attending: STUDENT IN AN ORGANIZED HEALTH CARE EDUCATION/TRAINING PROGRAM
Payer: COMMERCIAL

## 2024-08-14 DIAGNOSIS — G89.29 CHRONIC LEFT-SIDED LOW BACK PAIN WITH LEFT-SIDED SCIATICA: Primary | Chronic | ICD-10-CM

## 2024-08-14 DIAGNOSIS — J44.9 CHRONIC OBSTRUCTIVE PULMONARY DISEASE, UNSPECIFIED COPD TYPE (H): ICD-10-CM

## 2024-08-14 DIAGNOSIS — M54.42 CHRONIC LEFT-SIDED LOW BACK PAIN WITH LEFT-SIDED SCIATICA: Primary | Chronic | ICD-10-CM

## 2024-08-14 DIAGNOSIS — M25.572 ACUTE LEFT ANKLE PAIN: ICD-10-CM

## 2024-08-14 DIAGNOSIS — E46 MALNUTRITION, UNSPECIFIED TYPE (H): ICD-10-CM

## 2024-08-14 DIAGNOSIS — J96.11 CHRONIC RESPIRATORY FAILURE WITH HYPOXIA (H): Chronic | ICD-10-CM

## 2024-08-14 DIAGNOSIS — M54.42 ACUTE MIDLINE LOW BACK PAIN WITH LEFT-SIDED SCIATICA: ICD-10-CM

## 2024-08-14 DIAGNOSIS — G89.29 OTHER CHRONIC PAIN: ICD-10-CM

## 2024-08-14 DIAGNOSIS — R62.7 FAILURE TO THRIVE IN ADULT: ICD-10-CM

## 2024-08-14 DIAGNOSIS — R52 ACUTE PAIN: ICD-10-CM

## 2024-08-14 DIAGNOSIS — I48.0 PAROXYSMAL ATRIAL FIBRILLATION (H): ICD-10-CM

## 2024-08-14 LAB
ANION GAP SERPL CALCULATED.3IONS-SCNC: 18 MMOL/L (ref 7–15)
BUN SERPL-MCNC: 11.6 MG/DL (ref 8–23)
CALCIUM SERPL-MCNC: 9.9 MG/DL (ref 8.8–10.4)
CHLORIDE SERPL-SCNC: 99 MMOL/L (ref 98–107)
CK SERPL-CCNC: 35 U/L (ref 26–192)
CREAT SERPL-MCNC: 0.52 MG/DL (ref 0.51–0.95)
CRP SERPL-MCNC: 205 MG/L
EGFRCR SERPLBLD CKD-EPI 2021: >90 ML/MIN/1.73M2
ERYTHROCYTE [DISTWIDTH] IN BLOOD BY AUTOMATED COUNT: 13.1 % (ref 10–15)
ERYTHROCYTE [SEDIMENTATION RATE] IN BLOOD BY WESTERGREN METHOD: 38 MM/HR (ref 0–30)
GLUCOSE BLDC GLUCOMTR-MCNC: 118 MG/DL (ref 70–99)
GLUCOSE SERPL-MCNC: 96 MG/DL (ref 70–99)
HCO3 SERPL-SCNC: 26 MMOL/L (ref 22–29)
HCT VFR BLD AUTO: 45.8 % (ref 35–47)
HGB BLD-MCNC: 14.1 G/DL (ref 11.7–15.7)
MAGNESIUM SERPL-MCNC: 2 MG/DL (ref 1.7–2.3)
MCH RBC QN AUTO: 27.8 PG (ref 26.5–33)
MCHC RBC AUTO-ENTMCNC: 30.8 G/DL (ref 31.5–36.5)
MCV RBC AUTO: 90 FL (ref 78–100)
PLATELET # BLD AUTO: 253 10E3/UL (ref 150–450)
POTASSIUM SERPL-SCNC: 4.7 MMOL/L (ref 3.4–5.3)
PROCALCITONIN SERPL IA-MCNC: 0.07 NG/ML
RBC # BLD AUTO: 5.08 10E6/UL (ref 3.8–5.2)
SODIUM SERPL-SCNC: 143 MMOL/L (ref 135–145)
WBC # BLD AUTO: 12.2 10E3/UL (ref 4–11)

## 2024-08-14 PROCEDURE — 99222 1ST HOSP IP/OBS MODERATE 55: CPT | Mod: FS | Performed by: STUDENT IN AN ORGANIZED HEALTH CARE EDUCATION/TRAINING PROGRAM

## 2024-08-14 PROCEDURE — 36415 COLL VENOUS BLD VENIPUNCTURE: CPT | Performed by: PHYSICIAN ASSISTANT

## 2024-08-14 PROCEDURE — 120N000002 HC R&B MED SURG/OB UMMC

## 2024-08-14 PROCEDURE — 84145 PROCALCITONIN (PCT): CPT | Performed by: PHYSICIAN ASSISTANT

## 2024-08-14 PROCEDURE — 99207 PR APP CREDIT; MD BILLING SHARED VISIT: CPT | Performed by: PHYSICIAN ASSISTANT

## 2024-08-14 PROCEDURE — 85027 COMPLETE CBC AUTOMATED: CPT | Performed by: PHYSICIAN ASSISTANT

## 2024-08-14 PROCEDURE — 82550 ASSAY OF CK (CPK): CPT | Performed by: PHYSICIAN ASSISTANT

## 2024-08-14 PROCEDURE — 93970 EXTREMITY STUDY: CPT

## 2024-08-14 PROCEDURE — 73610 X-RAY EXAM OF ANKLE: CPT | Mod: 26 | Performed by: RADIOLOGY

## 2024-08-14 PROCEDURE — 93970 EXTREMITY STUDY: CPT | Mod: 26 | Performed by: STUDENT IN AN ORGANIZED HEALTH CARE EDUCATION/TRAINING PROGRAM

## 2024-08-14 PROCEDURE — 99285 EMERGENCY DEPT VISIT HI MDM: CPT | Mod: 25 | Performed by: STUDENT IN AN ORGANIZED HEALTH CARE EDUCATION/TRAINING PROGRAM

## 2024-08-14 PROCEDURE — 85652 RBC SED RATE AUTOMATED: CPT | Performed by: PHYSICIAN ASSISTANT

## 2024-08-14 PROCEDURE — 73610 X-RAY EXAM OF ANKLE: CPT | Mod: LT

## 2024-08-14 PROCEDURE — 99285 EMERGENCY DEPT VISIT HI MDM: CPT | Performed by: STUDENT IN AN ORGANIZED HEALTH CARE EDUCATION/TRAINING PROGRAM

## 2024-08-14 PROCEDURE — 258N000003 HC RX IP 258 OP 636: Performed by: PHYSICIAN ASSISTANT

## 2024-08-14 PROCEDURE — 80048 BASIC METABOLIC PNL TOTAL CA: CPT | Performed by: PHYSICIAN ASSISTANT

## 2024-08-14 PROCEDURE — 250N000013 HC RX MED GY IP 250 OP 250 PS 637: Performed by: PHYSICIAN ASSISTANT

## 2024-08-14 PROCEDURE — 86140 C-REACTIVE PROTEIN: CPT | Performed by: PHYSICIAN ASSISTANT

## 2024-08-14 PROCEDURE — 83735 ASSAY OF MAGNESIUM: CPT | Performed by: PHYSICIAN ASSISTANT

## 2024-08-14 RX ORDER — DEXTROSE MONOHYDRATE 25 G/50ML
25-50 INJECTION, SOLUTION INTRAVENOUS
Status: DISCONTINUED | OUTPATIENT
Start: 2024-08-14 | End: 2024-08-26 | Stop reason: HOSPADM

## 2024-08-14 RX ORDER — LOSARTAN POTASSIUM 50 MG/1
50 TABLET ORAL DAILY
Status: DISCONTINUED | OUTPATIENT
Start: 2024-08-14 | End: 2024-08-26 | Stop reason: HOSPADM

## 2024-08-14 RX ORDER — ACETYLCYSTEINE 100 MG/ML
4 SOLUTION ORAL; RESPIRATORY (INHALATION) 4 TIMES DAILY PRN
Status: DISCONTINUED | OUTPATIENT
Start: 2024-08-14 | End: 2024-08-26 | Stop reason: HOSPADM

## 2024-08-14 RX ORDER — ALBUTEROL SULFATE 90 UG/1
2 AEROSOL, METERED RESPIRATORY (INHALATION) EVERY 4 HOURS PRN
Status: DISCONTINUED | OUTPATIENT
Start: 2024-08-14 | End: 2024-08-17

## 2024-08-14 RX ORDER — CARBOXYMETHYLCELLULOSE SODIUM 10 MG/ML
1 GEL OPHTHALMIC 4 TIMES DAILY
Status: DISCONTINUED | OUTPATIENT
Start: 2024-08-14 | End: 2024-08-26 | Stop reason: HOSPADM

## 2024-08-14 RX ORDER — AMOXICILLIN 250 MG
2 CAPSULE ORAL 2 TIMES DAILY
Status: DISCONTINUED | OUTPATIENT
Start: 2024-08-14 | End: 2024-08-26 | Stop reason: HOSPADM

## 2024-08-14 RX ORDER — ONDANSETRON 2 MG/ML
4 INJECTION INTRAMUSCULAR; INTRAVENOUS EVERY 6 HOURS PRN
Status: DISCONTINUED | OUTPATIENT
Start: 2024-08-14 | End: 2024-08-26 | Stop reason: HOSPADM

## 2024-08-14 RX ORDER — NICOTINE POLACRILEX 4 MG
15-30 LOZENGE BUCCAL
Status: DISCONTINUED | OUTPATIENT
Start: 2024-08-14 | End: 2024-08-26 | Stop reason: HOSPADM

## 2024-08-14 RX ORDER — CYCLOBENZAPRINE HCL 5 MG
5 TABLET ORAL 3 TIMES DAILY PRN
Status: DISCONTINUED | OUTPATIENT
Start: 2024-08-14 | End: 2024-08-25

## 2024-08-14 RX ORDER — ACETAMINOPHEN 650 MG/1
650 SUPPOSITORY RECTAL EVERY 4 HOURS PRN
Status: DISCONTINUED | OUTPATIENT
Start: 2024-08-14 | End: 2024-08-17

## 2024-08-14 RX ORDER — LIDOCAINE 4 G/G
2 PATCH TOPICAL
Status: DISCONTINUED | OUTPATIENT
Start: 2024-08-14 | End: 2024-08-26 | Stop reason: HOSPADM

## 2024-08-14 RX ORDER — FLUTICASONE FUROATE AND VILANTEROL 200; 25 UG/1; UG/1
1 POWDER RESPIRATORY (INHALATION) DAILY
Status: DISCONTINUED | OUTPATIENT
Start: 2024-08-14 | End: 2024-08-26 | Stop reason: HOSPADM

## 2024-08-14 RX ORDER — DULOXETIN HYDROCHLORIDE 30 MG/1
30 CAPSULE, DELAYED RELEASE ORAL 2 TIMES DAILY
Status: DISCONTINUED | OUTPATIENT
Start: 2024-08-14 | End: 2024-08-26

## 2024-08-14 RX ORDER — AMOXICILLIN 250 MG
1 CAPSULE ORAL 2 TIMES DAILY
Status: DISCONTINUED | OUTPATIENT
Start: 2024-08-14 | End: 2024-08-26 | Stop reason: HOSPADM

## 2024-08-14 RX ORDER — OXYCODONE HYDROCHLORIDE 5 MG/1
5 TABLET ORAL EVERY 4 HOURS PRN
Status: DISCONTINUED | OUTPATIENT
Start: 2024-08-14 | End: 2024-08-15

## 2024-08-14 RX ORDER — CALCIUM CARBONATE 500 MG/1
1000 TABLET, CHEWABLE ORAL 4 TIMES DAILY PRN
Status: DISCONTINUED | OUTPATIENT
Start: 2024-08-14 | End: 2024-08-26 | Stop reason: HOSPADM

## 2024-08-14 RX ORDER — ACETAMINOPHEN 325 MG/1
650 TABLET ORAL EVERY 4 HOURS PRN
Status: DISCONTINUED | OUTPATIENT
Start: 2024-08-14 | End: 2024-08-17

## 2024-08-14 RX ORDER — AMOXICILLIN 250 MG
2 CAPSULE ORAL 2 TIMES DAILY PRN
Status: DISCONTINUED | OUTPATIENT
Start: 2024-08-14 | End: 2024-08-26 | Stop reason: HOSPADM

## 2024-08-14 RX ORDER — ROFLUMILAST 500 UG/1
500 TABLET ORAL EVERY MORNING
Status: DISCONTINUED | OUTPATIENT
Start: 2024-08-15 | End: 2024-08-26 | Stop reason: HOSPADM

## 2024-08-14 RX ORDER — AMOXICILLIN 250 MG
1 CAPSULE ORAL 2 TIMES DAILY PRN
Status: DISCONTINUED | OUTPATIENT
Start: 2024-08-14 | End: 2024-08-26 | Stop reason: HOSPADM

## 2024-08-14 RX ORDER — UREA 10 %
500 LOTION (ML) TOPICAL DAILY
Status: DISCONTINUED | OUTPATIENT
Start: 2024-08-15 | End: 2024-08-26 | Stop reason: HOSPADM

## 2024-08-14 RX ORDER — CETIRIZINE HYDROCHLORIDE 10 MG/1
10 TABLET ORAL EVERY MORNING
Status: DISCONTINUED | OUTPATIENT
Start: 2024-08-15 | End: 2024-08-26 | Stop reason: HOSPADM

## 2024-08-14 RX ORDER — ONDANSETRON 4 MG/1
4 TABLET, ORALLY DISINTEGRATING ORAL EVERY 6 HOURS PRN
Status: DISCONTINUED | OUTPATIENT
Start: 2024-08-14 | End: 2024-08-26 | Stop reason: HOSPADM

## 2024-08-14 RX ORDER — IPRATROPIUM BROMIDE AND ALBUTEROL SULFATE 2.5; .5 MG/3ML; MG/3ML
1 SOLUTION RESPIRATORY (INHALATION) EVERY 6 HOURS PRN
Status: DISCONTINUED | OUTPATIENT
Start: 2024-08-14 | End: 2024-08-17

## 2024-08-14 RX ORDER — ASPIRIN 81 MG/1
81 TABLET ORAL EVERY MORNING
Status: DISCONTINUED | OUTPATIENT
Start: 2024-08-15 | End: 2024-08-26 | Stop reason: HOSPADM

## 2024-08-14 RX ORDER — PREGABALIN 75 MG/1
150 CAPSULE ORAL 2 TIMES DAILY
Status: DISCONTINUED | OUTPATIENT
Start: 2024-08-14 | End: 2024-08-15

## 2024-08-14 RX ORDER — FLUTICASONE PROPIONATE 50 MCG
1 SPRAY, SUSPENSION (ML) NASAL 2 TIMES DAILY
Status: DISCONTINUED | OUTPATIENT
Start: 2024-08-14 | End: 2024-08-26 | Stop reason: HOSPADM

## 2024-08-14 RX ORDER — LIDOCAINE 40 MG/G
CREAM TOPICAL
Status: DISCONTINUED | OUTPATIENT
Start: 2024-08-14 | End: 2024-08-26 | Stop reason: HOSPADM

## 2024-08-14 RX ORDER — ATORVASTATIN CALCIUM 10 MG/1
10 TABLET, FILM COATED ORAL DAILY
Status: DISCONTINUED | OUTPATIENT
Start: 2024-08-15 | End: 2024-08-26 | Stop reason: HOSPADM

## 2024-08-14 RX ADMIN — LOSARTAN POTASSIUM 50 MG: 50 TABLET, FILM COATED ORAL at 22:04

## 2024-08-14 RX ADMIN — FLUTICASONE FUROATE AND VILANTEROL TRIFENATATE 1 PUFF: 200; 25 POWDER RESPIRATORY (INHALATION) at 22:10

## 2024-08-14 RX ADMIN — DULOXETINE HYDROCHLORIDE 30 MG: 30 CAPSULE, DELAYED RELEASE ORAL at 22:05

## 2024-08-14 RX ADMIN — UMECLIDINIUM 1 PUFF: 62.5 AEROSOL, POWDER ORAL at 22:12

## 2024-08-14 RX ADMIN — PREGABALIN 150 MG: 75 CAPSULE ORAL at 22:04

## 2024-08-14 RX ADMIN — SODIUM CHLORIDE 1000 ML: 9 INJECTION, SOLUTION INTRAVENOUS at 22:00

## 2024-08-14 RX ADMIN — LIDOCAINE 2 PATCH: 4 PATCH TOPICAL at 22:13

## 2024-08-14 RX ADMIN — CARBOXYMETHYLCELLULOSE SODIUM 1 DROP: 10 GEL OPHTHALMIC at 22:07

## 2024-08-14 ASSESSMENT — ACTIVITIES OF DAILY LIVING (ADL)
ADLS_ACUITY_SCORE: 36
CHANGE_IN_FUNCTIONAL_STATUS_SINCE_ONSET_OF_CURRENT_ILLNESS/INJURY: NO
ADLS_ACUITY_SCORE: 38
ADLS_ACUITY_SCORE: 38
EQUIPMENT_CURRENTLY_USED_AT_HOME: WALKER, ROLLING
TOILETING_ISSUES: NO
ADLS_ACUITY_SCORE: 38
ADLS_ACUITY_SCORE: 38
ADLS_ACUITY_SCORE: 36
DOING_ERRANDS_INDEPENDENTLY_DIFFICULTY: NO
ADLS_ACUITY_SCORE: 38
FALL_HISTORY_WITHIN_LAST_SIX_MONTHS: YES
DRESSING/BATHING_DIFFICULTY: NO
NUMBER_OF_TIMES_PATIENT_HAS_FALLEN_WITHIN_LAST_SIX_MONTHS: 1

## 2024-08-14 NOTE — ED TRIAGE NOTES
Pt BIBA with c/o left ankle swelling. Pt states that on Monday she tripped over her oxygen tubing, now with increased swelling & pain in LLE.      Triage Assessment (Adult)       Row Name 08/14/24 0821          Triage Assessment    Airway WDL WDL        Respiratory WDL    Respiratory WDL WDL        Skin Circulation/Temperature WDL    Skin Circulation/Temperature WDL WDL        Cardiac WDL    Cardiac WDL WDL        Peripheral/Neurovascular WDL    Peripheral Neurovascular WDL X  Left ankle swelling        Cognitive/Neuro/Behavioral WDL    Cognitive/Neuro/Behavioral WDL WDL

## 2024-08-14 NOTE — ED PROVIDER NOTES
[place] EMERGENCY DEPARTMENT ([facility])    8/14/24       ED PROVIDER NOTE       History     Chief Complaint   Patient presents with    Joint Swelling     HPI  Jenn Aparicio is a 69 year old female with a history of COPD and type 2 diabetes mellitus who presents to the ED via EMS with left ankle swelling. Patient reports tripping over her oxygen tubing on Monday (8/12/24) and is now endorsing increased swelling and pain. She states it has been progressively worsening and she is now unable to walk on it. She feels as if her left foot is more numb. She does not think she hit her head. She has no changes in breathing or oxygen levels.       Past Medical History  Past Medical History:   Diagnosis Date    Adenocarcinoma, lung (H)     Asthma     Ectopic pregnancy     Esophageal reflux     Pulmonary emphysema (H)     Very severe FEV1<30% predicted    Type II diabetes mellitus (H)      Past Surgical History:   Procedure Laterality Date    2/8/16                R thoracotomy, RLL lobectomy (Dr. Cunha). Adenocarcinoma, 1.1 cm, assoicated with atypical adenomatous hyperplasia Right 02/08/2016    R thoracotomy, RLL lobectomy (Dr. Cunha). Adenocarcinoma, 1.1 cm, assoicated with atypical adenomatous hyperplasia    BRONCHOSCOPY, WITH BIOPSY, ROBOT ASSISTED N/A 7/19/2023    Procedure: robot assisted Ion BRONCHOSCOPY, WITH BIOPSY;  Surgeon: Patria Garcia MD;  Location:  OR    ENDOBRONCHIAL ULTRASOUND FLEXIBLE N/A 7/19/2023    Procedure: Endobronchial ultrasound flexible;  Surgeon: Patria Garcia MD;  Location:  OR    ESOPHAGOSCOPY, GASTROSCOPY, DUODENOSCOPY (EGD), COMBINED N/A 8/16/2022    Procedure: ESOPHAGOGASTRODUODENOSCOPY (EGD);  Surgeon: Travis Briseno MD;  Location: [facility]    ESOPHAGOSCOPY, GASTROSCOPY, DUODENOSCOPY (EGD), COMBINED N/A 8/8/2023    Procedure: ESOPHAGOGASTRODUODENOSCOPY, WITH BIOPSY;  Surgeon: Chao Rodrigues DO;  Location:  GI    ORTHOPEDIC SURGERY      PHACOEMULSIFICATION CLEAR CORNEA  WITH STANDARD INTRAOCULAR LENS IMPLANT Left 8/24/2023    Procedure: LEFT EYE PHACOEMULSIFICATION, CATARACT, WITH INTRAOCULAR LENS IMPLANT;  Surgeon: Re Keita MD;  Location: [facility] OR    PHACOEMULSIFICATION CLEAR CORNEA WITH STANDARD INTRAOCULAR LENS IMPLANT Right 9/5/2023    Procedure: RIGHT EYE PHACOEMULSIFICATION, CATARACT, WITH INTRAOCULAR LENS IMPLANT;  Surgeon: Re Keita MD;  Location: [facility] OR     acetylcysteine (MUCOMYST) 10 % nebulizer solution  albuterol (PROAIR HFA/PROVENTIL HFA/VENTOLIN HFA) 108 (90 Base) MCG/ACT inhaler  albuterol (PROVENTIL) (2.5 MG/3ML) 0.083% neb solution  alpha-lipoic acid 600 MG capsule  aspirin 81 MG EC tablet  atorvastatin (LIPITOR) 10 MG tablet  blood glucose (ONETOUCH ULTRA) test strip  carboxymethylcellulose (REFRESH LIQUIGEL) 1 % ophthalmic solution  cetirizine (ZYRTEC) 10 MG tablet  Continuous Blood Gluc  (DEXCOM G7 ) JOSEPHINE  Continuous Glucose Sensor (DEXCOM G7 SENSOR) MISC  cyanocobalamin (VITAMIN B-12) 500 MCG tablet  cyclobenzaprine (FLEXERIL) 5 MG tablet  DULoxetine (CYMBALTA) 30 MG capsule  empagliflozin (JARDIANCE) 25 MG TABS tablet  EPINEPHrine (ANY BX GENERIC EQUIV) 0.3 MG/0.3ML injection 2-pack  fluticasone (FLONASE) 50 MCG/ACT nasal spray  Fluticasone-Umeclidin-Vilanterol (TRELEGY ELLIPTA) 200-62.5-25 MCG/ACT oral inhaler  GLUCOSAMINE-CHONDROITIN -400 MG tablet  ipratropium - albuterol 0.5 mg/2.5 mg/3 mL (DUONEB) 0.5-2.5 (3) MG/3ML neb solution  losartan (COZAAR) 50 MG tablet  omega-3 acid ethyl esters (LOVAZA) 1 g capsule  omeprazole (PRILOSEC) 20 MG DR capsule  polyethylene glycol-propylene glycol (SYSTANE) 0.4-0.3 % SOLN ophthalmic solution  pregabalin (LYRICA) 150 MG capsule  roflumilast (DALIRESP) 500 MCG TABS tablet  tirzepatide (MOUNJARO) 5 MG/0.5ML pen      Allergies   Allergen Reactions    Aspirin      325mg     Bee Venom Anaphylaxis    Penicillins Hives    Acetaminophen GI Disturbance    Azithromycin Dizziness    Colon Care      Interferons Dermatitis    Metformin GI Disturbance     Family History  Family History   Problem Relation Age of Onset    Thyroid Disease Mother     Cerebrovascular Disease Mother     Hypertension Mother     Hypertension Father     Glaucoma Father     Cancer Sister     Lung Cancer Sister     Diabetes Brother     Cancer Brother     Diabetes Brother     Cancer Brother     Diabetes Brother     Deep Vein Thrombosis (DVT) Daughter     Depression Daughter     Alcohol/Drug Other         self    Diabetes Other         self    Thyroid Disease Other         self    Asthma Other         self    Macular Degeneration No family hx of     Anesthesia Reaction No family hx of      Social History   Social History     Tobacco Use    Smoking status: Former     Current packs/day: 0.00     Types: Cigarettes     Quit date:      Years since quittin.6     Passive exposure: Past    Smokeless tobacco: Never    Tobacco comments:     2023 Patient using 14 mg patch, wants to have prescription for Nicotrol inhaler, took workbook   Vaping Use    Vaping status: Never Used   Substance Use Topics    Alcohol use: Not Currently    Drug use: No      Past medical history, past surgical history, medications, allergies, family history, and social history were reviewed with the patient. No additional pertinent items.   A medically appropriate review of systems was performed with pertinent positives and negatives noted in the HPI, and all other systems negative.    Physical Exam   BP: (!) 150/84  Pulse: 107  Temp: 98.7  F (37.1  C)  Resp: 15  SpO2: 99 %  Physical Exam  Constitutional:       General: She is not in acute distress.     Appearance: Normal appearance. She is not diaphoretic.   HENT:      Head: Atraumatic.      Mouth/Throat:      Mouth: Mucous membranes are moist.   Eyes:      General: No scleral icterus.     Conjunctiva/sclera: Conjunctivae normal.   Cardiovascular:      Rate and Rhythm: Normal rate.      Heart sounds: Normal  heart sounds.   Pulmonary:      Effort: No respiratory distress.      Breath sounds: Normal breath sounds.   Abdominal:      General: Abdomen is flat.   Musculoskeletal:      Cervical back: Neck supple.      Comments: Left ankle with mild swelling, tenderness to palpation diffusely around ankle, distally intact neurovascularly, no skin breaks, normal sensation   Skin:     General: Skin is warm.      Findings: No rash.   Neurological:      Mental Status: She is alert.           ED Course, Procedures, & Data      Procedures                Results for orders placed or performed during the hospital encounter of 08/14/24   XR Ankle Left G/E 3 Views     Status: None    Narrative    3 views left ankle radiographs 8/14/2024 3:09 PM    History: fall, pain    Comparison: No similar prior imaging    Findings:    AP, oblique, and lateral  views of the left ankle were obtained.     No acute osseous abnormality.      Ankle mortise and syndesmosis are congruent on these non weight  bearing images.    Achilles tendon insertional and plantar calcaneal enthesopathy.     Soft tissue swelling over the dorsum of the foods and medial ankle.      Impression    Impression:  1. No acute osseous abnormality.  2. Soft tissue swelling over the dorsum of the foods and medial ankle.    I have personally reviewed the examination and initial interpretation  and I agree with the findings.    ISRAEL MCFARLAND MD         SYSTEM ID:  [id number]     Medications - No data to display  Labs Ordered and Resulted from Time of ED Arrival to Time of ED Departure - No data to display  XR Ankle Left G/E 3 Views   Final Result   Impression:   1. No acute osseous abnormality.   2. Soft tissue swelling over the dorsum of the foods and medial ankle.      I have personally reviewed the examination and initial interpretation   and I agree with the findings.      ISRAEL MCFARLAND MD            SYSTEM ID:  [id number]      US Lower Extremity Venous Duplex Bilateral    (Results  Pending)          Critical care was not performed.     Medical Decision Making  The patient's presentation was of moderate complexity (an undiagnosed new problem with uncertain prognosis).    The patient's evaluation involved:  review of external note(s) from 3+ sources (see separate area of note for details)  review of 3+ test result(s) ordered prior to this encounter (see separate area of note for details)  ordering and/or review of 1 test(s) in this encounter (see separate area of note for details)  discussion of management or test interpretation with another health professional (hospitalist)    The patient's management necessitated high risk (a decision regarding hospitalization).    Assessment & Plan    Patient's x-ray read is reassuring here in the emergency room, no evidence of fracture or dislocation  When I went to speak with the patient she reports that she came to the emergency room today because she has not been able to eat much and has been using the bathroom on herself as she has had difficulty using the bathroom due to her inability to ambulate due to her pain and the fact that she is chronically on oxygen with severe diabetes and lives alone  At this point I am concerned patient will not be able to take care of herself living alone, and will plan to admit patient to medicine for failure to thrive in adult secondary to acute on chronic disability from a new fall and left ankle sprain  At time of signout to Dr. Buitrago patient pending ultrasound to rule out DVT    I have reviewed the nursing notes. I have reviewed the findings, diagnosis, plan and need for follow up with the patient.    New Prescriptions    No medications on file       Final diagnoses:   Failure to thrive in adult   Acute left ankle pain   IRAUL, am serving as a trained medical scribe to document services personally performed by Richmond Becker MD, based on the provider's statements to me.     I, Richmond Becker MD, was physically  present and have reviewed and verified the accuracy of this note documented by RAUL ROBLEDO.    Richmond Becker MD  [facility] EMERGENCY DEPARTMENT  8/14/2024        Richmond Becker MD  08/14/24 2522

## 2024-08-14 NOTE — H&P
[facility]    History and Physical - Hospitalist Service, GOLD TEAM        Date of Admission:  8/14/2024    Assessment & Plan      Jenn Aparicio is a 69 year old female admitted on 8/14/2024. She has PMH of O2 dependent COPD (2L O2 @ rest and 3L with exertion), Lung adenocarcinoma s/p RLL lobectomy (2/2016), SBRT to RUL nodule (1/2020) DMII c/b peripheral neuropathy, and HTN, HLD,who presents to the ED for evaluation of left ankle pain and swelling. Patient admitted to Medicine for further evaluation and care.       ## Left ankle pain and swelling  ## difficulty ambulating:  Presents with ~ 3 days hx of left ankle pain and swelling with inability ambulate. She is unsure if injury occurred, though could have 'rolled' her ankle on her O2 tubing. Pain is reported as constant and sharp. Patient also endorses  LL foot and toe paraesthesia. No fevers, chills, or previous surgery to Left ankle.  Xray on admission w/o acute osseus abnormality, though with soft tissue swelling over dorsum of foot and medial ankle. LLE US w/o DVT. Possible sprain vs infection vs other. WBC 12.2, , ESR 38  - oxycodone 2.5 mg -5.0 mg q4h PRN for pain  - Keep LLE elevated  - Cold packs PRN  - Orthopedic consult placed. Please discuss with in AM   - CBC, BMP, CRP and ESR, procal  - PT/OT  - Fall precautions   - IVF bolus- 1L NS  - May need CT of ankle with elevated CRP and c/f possible septic joint  - Antibiotics if spikes  - Blood culture and UA    ## Severe O2 dependent COPD:   ## Chronic Hypoxic respiratory failure  Baseline O2 requirements of 2L at rest and 3L w/ exertion, currently on 2L.  Home medications include: PRN albuterol, duoneb, mucomyst, Trelegy ellipta, and Roflumilast (patient states she only takes PRN- did not order, will need to clarify) . PFT s 4/21/23. FEV1 0.55 (24%), FVC 1.36 (46 %), DLCOunc 3.68 (17%).   - oxygen to keep O2 Saturation 90-92%  - Continue PTA medications:  "  - Follow up with Pulmonology as scheduled    ## Poorly controlled DMII: Hgb A1C (8.7- 7/9/24). Home medications include jaridance and weekly tirzepatide.   - HOLD PTA medications  - LSSI TID with meals and qhs  - MOD CHO diet  - BG checks TID with meals and qhs     ## HTN: Blood pressure mildly elevated  on admission at 150/84.  Home medications include losartan.  - Continue PTA medication    ## NSCLC, adenocarcinoma- RUL,RLL  ## S/p RLL lobectomy (2/2016): Dx 1/2015. STAGE: IA2 [id number] poorly diff adeno RLL, then [id number] IA2 suspected NSCLC RUL, medically inoperable S/p RLL lobectomy (2/2016) and SBRT to RUL nodule (1/2020). Follows with Dr. Leung with recent clinic visit 7/29/24 w/ plan for 3 months follow up.  - Follow up with Oncology as scheduled    ## Chronic LBP: PTA Cymbalta, lyrica. Reports increase in sx.   - Continue PTA Medications  - Lidocaine patch PRN        Diet:   MOD CHO   DVT Prophylaxis: Pneumatic Compression Devices  Andrade Catheter: Not present  Lines: None     Cardiac Monitoring: None  Code Status:  Full Code     Clinically Significant Risk Factors Present on Admission                # Drug Induced Platelet Defect: home medication list includes an antiplatelet medication   # Hypertension: Home medication list includes antihypertensive(s)        # DMII: A1C = 8.7 % (Ref range: <5.7 %) within past 6 months    # Obesity: Estimated body mass index is 32.28 kg/m  as calculated from the following:    Height as of 7/29/24: 1.676 m (5' 6\").    Weight as of 7/29/24: 90.7 kg (200 lb).                    Disposition Plan     Medically Ready for Discharge: Anticipated in 2-4 Days         The patient's care was discussed with the Attending Physician, Dr. Buitrago .    Yohana Santo PA-C  Hospitalist Service, [facility]  Securely message with [employer] (more info)  Text page via [facility] Paging/Directory   See signed in provider for up to date " coverage information    ______________________________________________________________________    Chief Complaint   Left ankle pain and swelling    History is obtained from the patient and EMR     History of Present Illness   Jenn Aparicio is a 69 year old female w/ PMH as outlined above who presents to the ED for evaluation of Left ankle pain and swelling. Patient reports that ~ 3 days ago she noticed the pain and swelling, though cannot recall a specific trauma event that may have caused this. She does report that she trips on her O2 tubing on occasion and this may have happened. Since the onset of sx, patient has not been able to ambulate. She has not taken medications in 3 days and cannot get to the bathroom. She lives alone and presented to the ED as she cannot currently care for herself. She denies falls, fever, chills, CP, cough, sob, die, increase in home O2. We discussed POC as outlined above and patient agreeable.       Past Medical History    Past Medical History:   Diagnosis Date    Adenocarcinoma, lung (H)     Asthma     Ectopic pregnancy     Esophageal reflux     Pulmonary emphysema (H)     Very severe FEV1<30% predicted    Type II diabetes mellitus (H)        Past Surgical History   Past Surgical History:   Procedure Laterality Date    2/8/16                R thoracotomy, RLL lobectomy (Dr. Cunha). Adenocarcinoma, 1.1 cm, assoicated with atypical adenomatous hyperplasia Right 02/08/2016    R thoracotomy, RLL lobectomy (Dr. Cunha). Adenocarcinoma, 1.1 cm, assoicated with atypical adenomatous hyperplasia    BRONCHOSCOPY, WITH BIOPSY, ROBOT ASSISTED N/A 7/19/2023    Procedure: robot assisted Ion BRONCHOSCOPY, WITH BIOPSY;  Surgeon: Patria Garcia MD;  Location: [facility] OR    ENDOBRONCHIAL ULTRASOUND FLEXIBLE N/A 7/19/2023    Procedure: Endobronchial ultrasound flexible;  Surgeon: Patria Garcia MD;  Location: [facility] OR    ESOPHAGOSCOPY, GASTROSCOPY, DUODENOSCOPY (EGD), COMBINED N/A 8/16/2022    Procedure:  ESOPHAGOGASTRODUODENOSCOPY (EGD);  Surgeon: Travis Briseno MD;  Location:  GI    ESOPHAGOSCOPY, GASTROSCOPY, DUODENOSCOPY (EGD), COMBINED N/A 8/8/2023    Procedure: ESOPHAGOGASTRODUODENOSCOPY, WITH BIOPSY;  Surgeon: Chao Rodrigues DO;  Location:  GI    ORTHOPEDIC SURGERY      PHACOEMULSIFICATION CLEAR CORNEA WITH STANDARD INTRAOCULAR LENS IMPLANT Left 8/24/2023    Procedure: LEFT EYE PHACOEMULSIFICATION, CATARACT, WITH INTRAOCULAR LENS IMPLANT;  Surgeon: Re Keita MD;  [place] [facility] [place]    PHACOEMULSIFICATION CLEAR CORNEA WITH STANDARD INTRAOCULAR LENS IMPLANT Right 9/5/2023    Procedure: RIGHT EYE PHACOEMULSIFICATION, CATARACT, WITH INTRAOCULAR LENS IMPLANT;  Surgeon: Re Keita MD;  [place] [facility] [place]       Prior to Admission Medications   Prior to Admission Medications   Prescriptions Last Dose Informant Patient Reported? Taking?   Continuous Blood Gluc  (DEXCOM G7 ) JOSEPHINE   No No   Sig: Use to read blood sugars as per 's instructions.   Continuous Glucose Sensor (DEXCOM G7 SENSOR) MISC   No No   Sig: Change every 10 days.   DULoxetine (CYMBALTA) 30 MG capsule   No No   Sig: Take 1 capsule twice a day.   EPINEPHrine (ANY BX GENERIC EQUIV) 0.3 MG/0.3ML injection 2-pack   No No   Sig: Inject 0.3 mLs (0.3 mg) into the muscle as needed for anaphylaxis (related to bee stings) May repeat one time in 5-15 minutes if response to initial dose is inadequate.   Fluticasone-Umeclidin-Vilanterol (TRELEGY ELLIPTA) 200-62.5-25 MCG/ACT oral inhaler   No No   Sig: USE 1 INHALATION DAILY   GLUCOSAMINE-CHONDROITIN -400 MG tablet   No No   Sig: TAKE 1 TABLET DAILY   Patient taking differently: Take 1 tablet by mouth every evening   acetylcysteine (MUCOMYST) 10 % nebulizer solution   No No   Sig: Inhale 4 mLs into the lungs 4 times daily as needed for mucolysis/respiratory distress   albuterol (PROAIR HFA/PROVENTIL HFA/VENTOLIN HFA) 108 (90 Base) MCG/ACT inhaler   No No   Sig: USE 1 OR 2  INHALATIONS EVERY 4 HOURS AS NEEDED   albuterol (PROVENTIL) (2.5 MG/3ML) 0.083% neb solution   No No   Sig: Take 1 vial (2.5 mg) by nebulization 4 times daily   alpha-lipoic acid 600 MG capsule   No No   Sig: Take 1 capsule (600 mg) by mouth daily   aspirin 81 MG EC tablet   No No   Sig: Take 1 tablet (81 mg) by mouth every morning   atorvastatin (LIPITOR) 10 MG tablet   No No   Sig: Take 1 tablet (10 mg) by mouth daily   blood glucose (ONETOUCH ULTRA) test strip   Yes No   carboxymethylcellulose (REFRESH LIQUIGEL) 1 % ophthalmic solution   No No   Sig: Place 1 drop into both eyes 4 times daily   cetirizine (ZYRTEC) 10 MG tablet   No No   Sig: Take 1 tablet (10 mg) by mouth every morning   cyanocobalamin (VITAMIN B-12) 500 MCG tablet   No No   Sig: Take 1 tablet (500 mcg) by mouth daily   cyclobenzaprine (FLEXERIL) 5 MG tablet   No No   Sig: Take 1 tablet (5 mg) by mouth 3 times daily as needed for muscle spasms   empagliflozin (JARDIANCE) 25 MG TABS tablet   No No   Sig: Take 1 tablet (25 mg) by mouth every morning   fluticasone (FLONASE) 50 MCG/ACT nasal spray   No No   Sig: Spray 1 spray into both nostrils 2 times daily Use at night before bed   ipratropium - albuterol 0.5 mg/2.5 mg/3 mL (DUONEB) 0.5-2.5 (3) MG/3ML neb solution   No No   Sig: Take 1 vial (3 mLs) by nebulization every 6 hours as needed for shortness of breath, wheezing or cough   losartan (COZAAR) 50 MG tablet   No No   Sig: Take 1 tablet (50 mg) by mouth daily   omega-3 acid ethyl esters (LOVAZA) 1 g capsule   No No   Sig: Take 2 capsules (2 g) by mouth 2 times daily   omeprazole (PRILOSEC) 20 MG DR capsule   No No   Sig: Take 1 capsule (20 mg) by mouth daily   polyethylene glycol-propylene glycol (SYSTANE) 0.4-0.3 % SOLN ophthalmic solution   No No   Sig: Place 1 drop into both eyes 4 times daily   pregabalin (LYRICA) 150 MG capsule   No No   Sig: Take 1 capsule (150 mg) by mouth 2 times daily TAKE 1 CAPSULE(150 MG) BY MOUTH TWICE DAILY    roflumilast (DALIRESP) 500 MCG TABS tablet   No No   Sig: Take 1 tablet (500 mcg) by mouth every morning   tirzepatide (MOUNJARO) 5 MG/0.5ML pen   No No   Sig: Inject 5 mg Subcutaneous every 7 days      Facility-Administered Medications: None        Review of Systems    The 10 point Review of Systems is negative other than noted in the HPI or here.      Physical Exam   Vital Signs: Temp: 98.7  F (37.1  C) Temp src: Oral BP: (!) 150/84 Pulse: 107   Resp: 15 SpO2: 99 % O2 Device: None (Room air)    Weight: 0 lbs 0 oz      Physical Exam   Constitutional: Pleasant female sitting up in wheelchair. Conversant.    Well nourished, well developed, resting comfortably   HEENT:   Head: Normocephalic and atraumatic.   Eyes: Conjunctivae are normal. Pupils are equal, round, and reactive to light.  Pharynx has no erythema or exudate, mucous membranes are moist  Neck:   No adenopathy, no bony tenderness  Cardiovascular:Tachycardia without murmurs or gallops  Pulmonary/Chest: Clear to auscultation bilaterally, with no wheezes or retractions. No respiratory distress on 2L O2 via NC.  GI: Soft with good bowel sounds.  Non-tender, non-distended, with no guarding, no rebound, no peritoneal signs.   Back:  No bony or CVA tenderness   Musculoskeletal: Left ankle w/ swelling and TTP about the medial and lateral aspects of ankle. No fluctuance or abscess noted.   Skin: Skin is warm and dry. No rash noted.   Neurological: Alert and oriented to person, place, and time. Nonfocal exam  Psychiatric:  Normal mood and affect.      Medical Decision Making       75 MINUTES SPENT BY ME on the date of service doing chart review, history, exam, documentation & further activities per the note.      Data         Imaging results reviewed over the past 24 hrs:   Recent Results (from the past 24 hour(s))   XR Ankle Left G/E 3 Views    Narrative    3 views left ankle radiographs 8/14/2024 3:09 PM    History: fall, pain    Comparison: No similar prior  imaging    Findings:    AP, oblique, and lateral  views of the left ankle were obtained.     No acute osseous abnormality.      Ankle mortise and syndesmosis are congruent on these non weight  bearing images.    Achilles tendon insertional and plantar calcaneal enthesopathy.     Soft tissue swelling over the dorsum of the foods and medial ankle.      Impression    Impression:  1. No acute osseous abnormality.  2. Soft tissue swelling over the dorsum of the foods and medial ankle.    I have personally reviewed the examination and initial interpretation  and I agree with the findings.    ISRAEL MCFARLAND MD         SYSTEM ID:  [id number]

## 2024-08-15 ENCOUNTER — APPOINTMENT (OUTPATIENT)
Dept: PHYSICAL THERAPY | Facility: CLINIC | Age: 69
DRG: 551 | End: 2024-08-15
Attending: PHYSICIAN ASSISTANT
Payer: COMMERCIAL

## 2024-08-15 ENCOUNTER — APPOINTMENT (OUTPATIENT)
Dept: MRI IMAGING | Facility: CLINIC | Age: 69
DRG: 551 | End: 2024-08-15
Attending: HOSPITALIST
Payer: COMMERCIAL

## 2024-08-15 ENCOUNTER — RESULTS ONLY (OUTPATIENT)
Dept: PEDIATRICS | Facility: CLINIC | Age: 69
End: 2024-08-15

## 2024-08-15 LAB
ANION GAP SERPL CALCULATED.3IONS-SCNC: 14 MMOL/L (ref 7–15)
ATRIAL RATE - MUSE: 0 BPM
ATRIAL RATE - MUSE: 107 BPM
BUN SERPL-MCNC: 12 MG/DL (ref 8–23)
CALCIUM SERPL-MCNC: 8.6 MG/DL (ref 8.8–10.4)
CHLORIDE SERPL-SCNC: 100 MMOL/L (ref 98–107)
CREAT SERPL-MCNC: 0.5 MG/DL (ref 0.51–0.95)
DIASTOLIC BLOOD PRESSURE - MUSE: NORMAL MMHG
DIASTOLIC BLOOD PRESSURE - MUSE: NORMAL MMHG
EGFRCR SERPLBLD CKD-EPI 2021: >90 ML/MIN/1.73M2
ERYTHROCYTE [DISTWIDTH] IN BLOOD BY AUTOMATED COUNT: 12.9 % (ref 10–15)
GLUCOSE BLDC GLUCOMTR-MCNC: 108 MG/DL (ref 70–99)
GLUCOSE BLDC GLUCOMTR-MCNC: 128 MG/DL (ref 70–99)
GLUCOSE BLDC GLUCOMTR-MCNC: 131 MG/DL (ref 70–99)
GLUCOSE BLDC GLUCOMTR-MCNC: 146 MG/DL (ref 70–99)
GLUCOSE BLDC GLUCOMTR-MCNC: 149 MG/DL (ref 70–99)
GLUCOSE SERPL-MCNC: 152 MG/DL (ref 70–99)
HCO3 SERPL-SCNC: 25 MMOL/L (ref 22–29)
HCT VFR BLD AUTO: 39.4 % (ref 35–47)
HGB BLD-MCNC: 12.8 G/DL (ref 11.7–15.7)
HOLD SPECIMEN: NORMAL
INR PPP: 1.15 (ref 0.85–1.15)
INTERPRETATION ECG - MUSE: NORMAL
INTERPRETATION ECG - MUSE: NORMAL
LACTATE SERPL-SCNC: 0.8 MMOL/L (ref 0.7–2)
MCH RBC QN AUTO: 28.5 PG (ref 26.5–33)
MCHC RBC AUTO-ENTMCNC: 32.5 G/DL (ref 31.5–36.5)
MCV RBC AUTO: 88 FL (ref 78–100)
P AXIS - MUSE: 81 DEGREES
P AXIS - MUSE: NORMAL DEGREES
PLATELET # BLD AUTO: 249 10E3/UL (ref 150–450)
POTASSIUM SERPL-SCNC: 3.6 MMOL/L (ref 3.4–5.3)
PR INTERVAL - MUSE: 160 MS
PR INTERVAL - MUSE: NORMAL MS
QRS DURATION - MUSE: 0 MS
QRS DURATION - MUSE: 86 MS
QT - MUSE: 0 MS
QT - MUSE: 342 MS
QTC - MUSE: 0 MS
QTC - MUSE: 456 MS
R AXIS - MUSE: 0 DEGREES
R AXIS - MUSE: 59 DEGREES
RBC # BLD AUTO: 4.49 10E6/UL (ref 3.8–5.2)
SODIUM SERPL-SCNC: 139 MMOL/L (ref 135–145)
SYSTOLIC BLOOD PRESSURE - MUSE: NORMAL MMHG
SYSTOLIC BLOOD PRESSURE - MUSE: NORMAL MMHG
T AXIS - MUSE: 0 DEGREES
T AXIS - MUSE: 46 DEGREES
VENTRICULAR RATE- MUSE: 0 BPM
VENTRICULAR RATE- MUSE: 107 BPM
WBC # BLD AUTO: 10.3 10E3/UL (ref 4–11)

## 2024-08-15 PROCEDURE — 83605 ASSAY OF LACTIC ACID: CPT | Performed by: PHYSICIAN ASSISTANT

## 2024-08-15 PROCEDURE — 97161 PT EVAL LOW COMPLEX 20 MIN: CPT | Mod: GP | Performed by: PHYSICAL THERAPIST

## 2024-08-15 PROCEDURE — 258N000003 HC RX IP 258 OP 636: Performed by: HOSPITALIST

## 2024-08-15 PROCEDURE — 250N000011 HC RX IP 250 OP 636: Performed by: HOSPITALIST

## 2024-08-15 PROCEDURE — 99207 PR NO BILLABLE SERVICE THIS VISIT: CPT | Performed by: STUDENT IN AN ORGANIZED HEALTH CARE EDUCATION/TRAINING PROGRAM

## 2024-08-15 PROCEDURE — 80048 BASIC METABOLIC PNL TOTAL CA: CPT | Performed by: PHYSICIAN ASSISTANT

## 2024-08-15 PROCEDURE — 250N000013 HC RX MED GY IP 250 OP 250 PS 637: Performed by: HOSPITALIST

## 2024-08-15 PROCEDURE — 250N000011 HC RX IP 250 OP 636: Performed by: PHYSICIAN ASSISTANT

## 2024-08-15 PROCEDURE — A9585 GADOBUTROL INJECTION: HCPCS | Performed by: HOSPITALIST

## 2024-08-15 PROCEDURE — 85014 HEMATOCRIT: CPT | Performed by: PHYSICIAN ASSISTANT

## 2024-08-15 PROCEDURE — 120N000002 HC R&B MED SURG/OB UMMC

## 2024-08-15 PROCEDURE — 72158 MRI LUMBAR SPINE W/O & W/DYE: CPT

## 2024-08-15 PROCEDURE — 93005 ELECTROCARDIOGRAM TRACING: CPT

## 2024-08-15 PROCEDURE — 97530 THERAPEUTIC ACTIVITIES: CPT | Mod: GP | Performed by: PHYSICAL THERAPIST

## 2024-08-15 PROCEDURE — 72158 MRI LUMBAR SPINE W/O & W/DYE: CPT | Mod: 26 | Performed by: STUDENT IN AN ORGANIZED HEALTH CARE EDUCATION/TRAINING PROGRAM

## 2024-08-15 PROCEDURE — 93010 ELECTROCARDIOGRAM REPORT: CPT | Performed by: INTERNAL MEDICINE

## 2024-08-15 PROCEDURE — 85610 PROTHROMBIN TIME: CPT | Performed by: PHYSICIAN ASSISTANT

## 2024-08-15 PROCEDURE — 99233 SBSQ HOSP IP/OBS HIGH 50: CPT | Performed by: HOSPITALIST

## 2024-08-15 PROCEDURE — 255N000002 HC RX 255 OP 636: Performed by: HOSPITALIST

## 2024-08-15 PROCEDURE — 87040 BLOOD CULTURE FOR BACTERIA: CPT | Performed by: PHYSICIAN ASSISTANT

## 2024-08-15 PROCEDURE — 250N000013 HC RX MED GY IP 250 OP 250 PS 637: Performed by: PHYSICIAN ASSISTANT

## 2024-08-15 PROCEDURE — 36415 COLL VENOUS BLD VENIPUNCTURE: CPT | Performed by: PHYSICIAN ASSISTANT

## 2024-08-15 RX ORDER — NALOXONE HYDROCHLORIDE 0.4 MG/ML
0.2 INJECTION, SOLUTION INTRAMUSCULAR; INTRAVENOUS; SUBCUTANEOUS
Status: DISCONTINUED | OUTPATIENT
Start: 2024-08-15 | End: 2024-08-26 | Stop reason: HOSPADM

## 2024-08-15 RX ORDER — NALOXONE HYDROCHLORIDE 0.4 MG/ML
0.4 INJECTION, SOLUTION INTRAMUSCULAR; INTRAVENOUS; SUBCUTANEOUS
Status: DISCONTINUED | OUTPATIENT
Start: 2024-08-15 | End: 2024-08-26 | Stop reason: HOSPADM

## 2024-08-15 RX ORDER — PREGABALIN 200 MG/1
200 CAPSULE ORAL 2 TIMES DAILY
Status: DISCONTINUED | OUTPATIENT
Start: 2024-08-15 | End: 2024-08-17

## 2024-08-15 RX ORDER — OXYCODONE HYDROCHLORIDE 5 MG/1
5 TABLET ORAL EVERY 4 HOURS PRN
Status: DISCONTINUED | OUTPATIENT
Start: 2024-08-15 | End: 2024-08-22

## 2024-08-15 RX ORDER — OXYCODONE HYDROCHLORIDE 10 MG/1
10 TABLET ORAL EVERY 4 HOURS PRN
Status: DISCONTINUED | OUTPATIENT
Start: 2024-08-15 | End: 2024-08-22

## 2024-08-15 RX ORDER — SODIUM CHLORIDE, SODIUM LACTATE, POTASSIUM CHLORIDE, CALCIUM CHLORIDE 600; 310; 30; 20 MG/100ML; MG/100ML; MG/100ML; MG/100ML
INJECTION, SOLUTION INTRAVENOUS CONTINUOUS
Status: DISCONTINUED | OUTPATIENT
Start: 2024-08-15 | End: 2024-08-17

## 2024-08-15 RX ORDER — GADOBUTROL 604.72 MG/ML
0.1 INJECTION INTRAVENOUS ONCE
Status: COMPLETED | OUTPATIENT
Start: 2024-08-15 | End: 2024-08-15

## 2024-08-15 RX ORDER — HYDROMORPHONE HYDROCHLORIDE 1 MG/ML
0.3 INJECTION, SOLUTION INTRAMUSCULAR; INTRAVENOUS; SUBCUTANEOUS EVERY 4 HOURS PRN
Status: DISCONTINUED | OUTPATIENT
Start: 2024-08-15 | End: 2024-08-21

## 2024-08-15 RX ADMIN — FLUTICASONE PROPIONATE 1 SPRAY: 50 SPRAY, METERED NASAL at 20:26

## 2024-08-15 RX ADMIN — Medication 5 MG: at 12:38

## 2024-08-15 RX ADMIN — LIDOCAINE 2 PATCH: 4 PATCH TOPICAL at 20:16

## 2024-08-15 RX ADMIN — Medication 5 MG: at 08:24

## 2024-08-15 RX ADMIN — LOSARTAN POTASSIUM 50 MG: 50 TABLET, FILM COATED ORAL at 08:06

## 2024-08-15 RX ADMIN — POLYETHYLENE GLYCOL 400 AND PROPYLENE GLYCOL 1 DROP: 4; 3 SOLUTION/ DROPS OPHTHALMIC at 16:46

## 2024-08-15 RX ADMIN — FLUTICASONE FUROATE AND VILANTEROL TRIFENATATE 1 PUFF: 200; 25 POWDER RESPIRATORY (INHALATION) at 08:18

## 2024-08-15 RX ADMIN — OMEPRAZOLE 20 MG: 20 CAPSULE, DELAYED RELEASE ORAL at 08:05

## 2024-08-15 RX ADMIN — SENNOSIDES AND DOCUSATE SODIUM 2 TABLET: 50; 8.6 TABLET ORAL at 08:07

## 2024-08-15 RX ADMIN — GADOBUTROL 9.2 ML: 604.72 INJECTION INTRAVENOUS at 18:35

## 2024-08-15 RX ADMIN — ONDANSETRON 4 MG: 4 TABLET, ORALLY DISINTEGRATING ORAL at 20:20

## 2024-08-15 RX ADMIN — PREGABALIN 200 MG: 100 CAPSULE ORAL at 20:15

## 2024-08-15 RX ADMIN — DULOXETINE HYDROCHLORIDE 30 MG: 30 CAPSULE, DELAYED RELEASE ORAL at 20:15

## 2024-08-15 RX ADMIN — POLYETHYLENE GLYCOL 400 AND PROPYLENE GLYCOL 1 DROP: 4; 3 SOLUTION/ DROPS OPHTHALMIC at 20:26

## 2024-08-15 RX ADMIN — UMECLIDINIUM 1 PUFF: 62.5 AEROSOL, POWDER ORAL at 08:19

## 2024-08-15 RX ADMIN — SENNOSIDES AND DOCUSATE SODIUM 2 TABLET: 50; 8.6 TABLET ORAL at 20:14

## 2024-08-15 RX ADMIN — CARBOXYMETHYLCELLULOSE SODIUM 1 DROP: 10 GEL OPHTHALMIC at 20:15

## 2024-08-15 RX ADMIN — POLYETHYLENE GLYCOL 400 AND PROPYLENE GLYCOL 1 DROP: 4; 3 SOLUTION/ DROPS OPHTHALMIC at 08:20

## 2024-08-15 RX ADMIN — OXYCODONE HYDROCHLORIDE 10 MG: 10 TABLET ORAL at 16:45

## 2024-08-15 RX ADMIN — FLUTICASONE PROPIONATE 1 SPRAY: 50 SPRAY, METERED NASAL at 08:19

## 2024-08-15 RX ADMIN — SODIUM CHLORIDE, POTASSIUM CHLORIDE, SODIUM LACTATE AND CALCIUM CHLORIDE: 600; 310; 30; 20 INJECTION, SOLUTION INTRAVENOUS at 20:33

## 2024-08-15 RX ADMIN — POLYETHYLENE GLYCOL 400 AND PROPYLENE GLYCOL 1 DROP: 4; 3 SOLUTION/ DROPS OPHTHALMIC at 12:33

## 2024-08-15 RX ADMIN — CARBOXYMETHYLCELLULOSE SODIUM 1 DROP: 10 GEL OPHTHALMIC at 12:33

## 2024-08-15 RX ADMIN — PREGABALIN 150 MG: 75 CAPSULE ORAL at 08:05

## 2024-08-15 RX ADMIN — Medication 5 MG: at 03:37

## 2024-08-15 RX ADMIN — HYDROMORPHONE HYDROCHLORIDE 0.3 MG: 1 INJECTION, SOLUTION INTRAMUSCULAR; INTRAVENOUS; SUBCUTANEOUS at 13:07

## 2024-08-15 RX ADMIN — CARBOXYMETHYLCELLULOSE SODIUM 1 DROP: 10 GEL OPHTHALMIC at 16:46

## 2024-08-15 RX ADMIN — CARBOXYMETHYLCELLULOSE SODIUM 1 DROP: 10 GEL OPHTHALMIC at 08:08

## 2024-08-15 RX ADMIN — ATORVASTATIN CALCIUM 10 MG: 10 TABLET, FILM COATED ORAL at 08:05

## 2024-08-15 RX ADMIN — CETIRIZINE HYDROCHLORIDE 10 MG: 10 TABLET, FILM COATED ORAL at 08:08

## 2024-08-15 RX ADMIN — DULOXETINE HYDROCHLORIDE 30 MG: 30 CAPSULE, DELAYED RELEASE ORAL at 08:05

## 2024-08-15 RX ADMIN — CYANOCOBALAMIN TAB 500 MCG 500 MCG: 500 TAB at 11:22

## 2024-08-15 RX ADMIN — ASPIRIN 81 MG: 81 TABLET, COATED ORAL at 08:08

## 2024-08-15 RX ADMIN — SENNOSIDES AND DOCUSATE SODIUM 2 TABLET: 50; 8.6 TABLET ORAL at 20:15

## 2024-08-15 ASSESSMENT — ACTIVITIES OF DAILY LIVING (ADL)
DEPENDENT_IADLS:: SHOPPING;TRANSPORTATION
ADLS_ACUITY_SCORE: 25
ADLS_ACUITY_SCORE: 28
ADLS_ACUITY_SCORE: 25
ADLS_ACUITY_SCORE: 29
ADLS_ACUITY_SCORE: 28
ADLS_ACUITY_SCORE: 25
ADLS_ACUITY_SCORE: 29
ADLS_ACUITY_SCORE: 25
ADLS_ACUITY_SCORE: 29
ADLS_ACUITY_SCORE: 28
ADLS_ACUITY_SCORE: 29
ADLS_ACUITY_SCORE: 29
ADLS_ACUITY_SCORE: 28
ADLS_ACUITY_SCORE: 29
ADLS_ACUITY_SCORE: 25
ADLS_ACUITY_SCORE: 25
ADLS_ACUITY_SCORE: 29
ADLS_ACUITY_SCORE: 28
ADLS_ACUITY_SCORE: 29

## 2024-08-15 NOTE — PLAN OF CARE
VS: BP (!) 149/78 (BP Location: Left arm)   Pulse 101   Temp 98.8  F (37.1  C) (Oral)   Resp 18   Wt 92 kg (202 lb 13.2 oz)   SpO2 99%   BMI 32.74 kg/m       O2: 1 L N/C   Output: 0   Last BM: 8/12 per pt. report   Activity: Not OOB this shift   Skin: WDL   Pain: 10/10 in the back radiate to left leg   CMS: A/O x4   Dressing: none   Diet: Reg diet, very little appetite   LDA: PIV- Left S/L   Equipment: Walker, commode   Plan:    Additional Info: Pt is using a purewick at night.

## 2024-08-15 NOTE — CONSULTS
[facility] Orthopedic Consultation    Jenn Aparicio MRN# [id number]   Age: 69 year old YOB: 1955   Date of Admission:  8/14/2024    Reason for consult: Left ankle pain   Requesting physician: Freddie Ramírez MD              Impression and Recommendation:     Impression:  Jenn Aparicio is a 69 year old  dominant female with PMH significant for O2 dependent COPD, lung adenocarcinoma s/p RLL lobectomy, DMII c/b peripheral neuropathy (A1C 8.1 3/7/24), HTN, and HLD who presented to [facility] ED 8/14/24 with left ankle sprain after a fall, admitted for failure to thrive.     Recomendations:  -No operative intervention required at this time   -Weight bearing as tolerated, could consider ankle stirrup brace for comfort   -No orthopedic follow up necessary     Assessment and Plan discussed with Dr. Carter, PGY 4 and Dr. Farrell, Orthopedic Surgery attending.          Chief Complaint:   Left ankle pain          History of Present Illness:   Jenn Aparicio is a 69 year old  dominant female with PMH significant for O2 dependent COPD, lung adenocarcinoma s/p RLL lobectomy, DMII c/b peripheral neuropathy (A1C 8.1 3/7/24), HTN, and HLD who presented to [facility] ED 8/14/24 with left ankle pain and swelling after a fall. Jenn states she tripped and fell over her oxygen tubing the evening of 8/11, fell to the ground though denies hitting her head. She had immediate pain in the left ankle. Has been able to ambulate with pain and difficulty. Endorses chronic peripheral numbness that is unchanged. Chronic low back pain that is unchanged s/p L2 bone biopsy 7/22/24 which was negative for malignancy. Ultimately called an ambulance 8/14 as she was having a difficulty getting around and caring for herself. XR of the left ankle in the ED was without fracture or dislocation. Lower extremity ultrasound was without DVT. Admitted for failure to thrive.     Pain:  Onset: Acute after fall 8/11  Location: Medial ankle   Severity: Severe  with standing, mild at rest   Quality: Sharp  Alleviating: Rest and ice  Exacerbating: Movement, palpation, weight bearing   Associated Symptoms: No associated numbness/tingling.      Denies numbness, tingling, or weakness to the affected extremities.  Denies fevers, chills, nausea, vomiting, diarrhea, constipation, chest pain, shortness of breath.     History obtained from patient interview and chart review.        Past Medical History:     Past Medical History:   Diagnosis Date    Adenocarcinoma, lung (H)     Asthma     Ectopic pregnancy     Esophageal reflux     Pulmonary emphysema (H)     Very severe FEV1<30% predicted    Type II diabetes mellitus (H)              Past Surgical History:     Past Surgical History:   Procedure Laterality Date    2/8/16                R thoracotomy, RLL lobectomy (Dr. Cunha). Adenocarcinoma, 1.1 cm, assoicated with atypical adenomatous hyperplasia Right 02/08/2016    R thoracotomy, RLL lobectomy (Dr. Cunha). Adenocarcinoma, 1.1 cm, assoicated with atypical adenomatous hyperplasia    BRONCHOSCOPY, WITH BIOPSY, ROBOT ASSISTED N/A 7/19/2023    Procedure: robot assisted Ion BRONCHOSCOPY, WITH BIOPSY;  Surgeon: Patria Garcia MD;  Location:  OR    ENDOBRONCHIAL ULTRASOUND FLEXIBLE N/A 7/19/2023    Procedure: Endobronchial ultrasound flexible;  Surgeon: Patria Garcia MD;  Location:  OR    ESOPHAGOSCOPY, GASTROSCOPY, DUODENOSCOPY (EGD), COMBINED N/A 8/16/2022    Procedure: ESOPHAGOGASTRODUODENOSCOPY (EGD);  Surgeon: Travis Briseno MD;  Location: [facility]    ESOPHAGOSCOPY, GASTROSCOPY, DUODENOSCOPY (EGD), COMBINED N/A 8/8/2023    Procedure: ESOPHAGOGASTRODUODENOSCOPY, WITH BIOPSY;  Surgeon: Chao Rodrigues DO;  Location:  GI    ORTHOPEDIC SURGERY      PHACOEMULSIFICATION CLEAR CORNEA WITH STANDARD INTRAOCULAR LENS IMPLANT Left 8/24/2023    Procedure: LEFT EYE PHACOEMULSIFICATION, CATARACT, WITH INTRAOCULAR LENS IMPLANT;  Surgeon: Re Keita MD;  Location: [facility] [place]     PHACOEMULSIFICATION CLEAR CORNEA WITH STANDARD INTRAOCULAR LENS IMPLANT Right 9/5/2023    Procedure: RIGHT EYE PHACOEMULSIFICATION, CATARACT, WITH INTRAOCULAR LENS IMPLANT;  Surgeon: Re Keita MD;  Location: [facility] OR               Allergies:     Allergies   Allergen Reactions    Aspirin      325mg     Bee Venom Anaphylaxis    Penicillins Hives    Acetaminophen GI Disturbance    Azithromycin Dizziness    Colon Care     Interferons Dermatitis    Metformin GI Disturbance             Medications:   Medication reviewed with patient and in chart.  Anticoagulation: ASA 81 mg daily  Antibiotics: None          Review of Systems:   See HPI. 10 point review of systems was otherwise negative.           Physical Exam:     Vitals:    08/15/24 0322 08/15/24 0323 08/15/24 0635 08/15/24 0741   BP:   129/58 (!) 141/63   BP Location:   Left arm Left arm   Pulse:   102 98   Resp:   18 18   Temp:   98.3  F (36.8  C) 97.5  F (36.4  C)   TempSrc:   Oral Oral   SpO2: 99% 98% 96% 98%   Weight:    92 kg (202 lb 13.2 oz)     General: Awake, alert, appropriate, following commands, NAD.  Neuro: EOM grossly intact  Skin: No rashes,  skin color normal.  HEENT: Normal.   Lungs: Breathing comfortably on 2L O2, no wheezes or stridor noted.  Heart/Cardiovascular: Regular pulse, no peripheral cyanosis.    Right Lower Extremity:   - No gross deformity, skin intact.  - No significant tenderness to palpation over thigh, knee, leg, ankle, foot, toes.  - No pain with ROM hip, knee, ankle.   - Motor intact distally TA, GSC, EHL, FHL with 4/5 strength.  - Decreased sensation to light touch distally  - DP/PT pulses palpable, toes warm and well perfused.    Left Lower Extremity:   - Soft tissue swelling about the medial ankle, no overlying skin changes, erythema, induration, or fluctuance  - Tenderness to palpation about the medial malleolus, otherwise not joint line or lateral tenderness. No tenderness over the dorsal aspect of the foot.    - Pain with  "plantar flexion at the ankle and end range dorsiflexion. No pain with ROM hip or knee.  - Motor exam limited 2/2 pain, though EHL, FHL with 4/5 strength.  - Decreased sensation to light touch distally   - DP/PT pulses palpable, toes warm and well perfused.              Imaging:   All imaging independently reviewed.  3 view of the left ankle from 8/14/2024 are with without mortise widening, fracture, or dislocation.   LLE US 8/14/24 negative for DVT           Laboratory date:   CBC:  Lab Results   Component Value Date    WBC 12.2 (H) 08/14/2024    HGB 14.1 08/14/2024     08/14/2024       BMP:  Lab Results   Component Value Date     08/14/2024    POTASSIUM 4.7 08/14/2024    CHLORIDE 99 08/14/2024    CO2 26 08/14/2024    BUN 11.6 08/14/2024    CR 0.52 08/14/2024    ANIONGAP 18 (H) 08/14/2024    LUIGI 9.9 08/14/2024     (H) 08/15/2024       Inflammatory Markers:  Lab Results   Component Value Date    WBC 12.2 (H) 08/14/2024    CRP <2.9 05/22/2022    SED 38 (H) 08/14/2024       Cultures:  No results for input(s): \"CULT\" in the last 168 hours.      Sunitha Galo PA-C  8/15/2024 8:41 AM  Orthopaedic Surgery          "

## 2024-08-15 NOTE — CONSULTS
Care Management Initial Consult    General Information  Assessment completed with: Patient,    Type of CM/SW Visit: Initial Assessment    Primary Care Provider verified and updated as needed: Yes (CCR task created to schedule hospital follow-up visit with PCP.)   Readmission within the last 30 days: no previous admission in last 30 days      Reason for Consult: discharge planning  Advance Care Planning: Advance Care Planning Reviewed: present on chart          Communication Assessment  Patient's communication style: spoken language (English or Bilingual)    Hearing Difficulty or Deaf: no   Wear Glasses or Blind: yes    Cognitive  Cognitive/Neuro/Behavioral: WDL                      Living Environment:   People in home: alone     Current living Arrangements: apartment      Able to return to prior arrangements: other (see comments) (TCU is being recommended.)       Family/Social Support:  Care provided by: self  Provides care for: no one  Marital Status: Single  Sibling(s), Children          Description of Support System: Supportive, Involved    Support Assessment: Adequate family and caregiver support, Adequate social supports    Current Resources:   Patient receiving home care services: No     Community Resources: [facility], [facility] Worker (Patient has CADI waiver. Her  is through [facility] for Resources. Her CM is working on establishing PCA and homemaking services.)  Equipment currently used at home: walker, rolling, cane, straight (uses SEC as need in home, 4WW in communicate)  Supplies currently used at home: Oxygen Tubing/Supplies (Supplied through [facility])    Employment/Financial:  Employment Status: disabled        Financial Concerns: none   Referral to Financial Worker: No       Does the patient's insurance plan have a 3 day qualifying hospital stay waiver?  Yes     Which insurance plan 3 day waiver is available? Alternative insurance waiver    Will the waiver be used for  post-acute placement? Yes    Lifestyle & Psychosocial Needs:  Social Determinants of Health     Food Insecurity: Not on file   Depression: Not at risk (4/9/2024)    PHQ-2     PHQ-2 Score: 1   Housing Stability: Not on file   Tobacco Use: Medium Risk (7/29/2024)    Patient History     Smoking Tobacco Use: Former     Smokeless Tobacco Use: Never     Passive Exposure: Past   Financial Resource Strain: Not on file   Alcohol Use: Not At Risk (1/7/2019)    Received from [facility]    AUDIT-C     Frequency of Alcohol Consumption: Monthly or less     Average Number of Drinks: 1 or 2     Frequency of Binge Drinking: Never   Transportation Needs: Not on file   Physical Activity: Not on file   Interpersonal Safety: Not At Risk (5/24/2024)    Received from [facility]     Humiliation, Afraid, Rape, and Kick questionnaire     Fear of Current or Ex-Partner: No     Emotionally Abused: No     Physically Abused: No     Sexually Abused: No   Stress: Not on file   Social Connections: Not on file   Health Literacy: Not on file       Functional Status:  Prior to admission patient needed assistance:   Dependent ADLs:: Ambulation-walker, Ambulation-cane, Independent  Dependent IADLs:: Shopping, Transportation  Assesssment of Functional Status: Needs placement in a SNF/TCF for rehabilitation    Mental Health Status:  Mental Health Status: No Current Concerns       Chemical Dependency Status:  Chemical Dependency Status: No Current Concerns             Values/Beliefs:  Spiritual, Cultural Beliefs, Sikh Practices, Values that affect care: no               Additional Information:    SW met with patient at bedside and introduced self/role alongside shan Joseph. TERESSA completed Initial Assessment. Please see above for assessment information.    SW reviewed demographic information (address, patient phone number, emergency contacts, insurance), no changes necessary.    SW met with patient at bedside. SW discussed  recommendation by PT for Transitional Care (TCU). Patient agreed that a TCU stay for rehab would be beneficial. SW presented Medicare.gov list of TCU facilities and requested that patient identify 5-10 preferences of facilities for SW to send referrals to. SW explained the Medicare.gov list - the star rating, what contributes to the rating, and how to find additional information about most recent health department survey results on Medicare.gov. SW explained referral process, noting that SW will send referrals as soon as possible in order to avoid a prolonged hospitalization while waiting for accepting facility. SW explained that acceptance to TCU facilitates depends on many factors, including bed availability/staffing, insurance coverage, and level of care/acuity. SW explained that once patient is medically ready for discharge, patient will be discharged to the first accepting facility. Patient verbalized understanding. SW discussed insurance coverage for TCU. Patient has [facility] for insurance. SW reviewed days 1-20 and days  under Medicare coverage. SW instructed patient to reach out to her particular insurance company for specific coverage information. No further questions at this time.    SW/Care management will continue to follow.          Lily Garcia, SILVESTREW, LSW  8 Med Surg   [facility]  Phone: [phone]  Vocera: Searchable by name or group: 8 Med Surg Vocera

## 2024-08-15 NOTE — PROGRESS NOTES
8M Admission Note    Reason for admission: Failure to thrive   Primary team notified of pt arrival. Yes  Admitted from: [place]  Via: [employer]  Accompanied by: Self  Belongings: Placed in closet; valuables sent home with family  Admission Required Doc Completed: Yes  Teaching: Orientation to unit and call light- call light within reach, use of console, meal times, when to call for the RN, and enforced importance of safety.  IV Access: R PIV  Telemetry: NO  Ht./Wt.: Completed   Code Status verified on armband: Yes  2 RN Skin Assessment Completed with: JOHANN Marvin.  Suction/Ambu bag/Flowmeter at bedside: Yes    Pt status:    Temp: 98.1  F (36.7  C)  BP: (!) 143/80   Pulse: 110     Resp: 18   SpO2: 100 % O2 Device: None (Room air) Oxygen Delivery: 2 LPM

## 2024-08-15 NOTE — PLAN OF CARE
Goal Outcome Evaluation:      Plan of Care Reviewed With: patient    Overall Patient Progress: no changeOverall Patient Progress: no change    Outcome Evaluation: Discussed discharge planning. Patient will discharge to TCU once medically ready and placement is found.

## 2024-08-15 NOTE — PROGRESS NOTES
08/15/24 1047   Appointment Info   Signing Clinician's Name / Credentials (PT) Soha Jones SPT   Student Supervision On-site supervision provided   Living Environment   People in Home alone   Current Living Arrangements apartment   Home Accessibility no concerns   Transportation Anticipated agency   Living Environment Comments pt lives alone in apartment, one level, no stairs to enter   Self-Care   Usual Activity Tolerance moderate   Current Activity Tolerance poor   Equipment Currently Used at Home walker, rolling;cane, straight  (uses SEC as need in home, 4WW in communicate)   Fall history within last six months yes   Number of times patient has fallen within last six months 1  (pt unsure on how many recent falls, certain there has been 1 or more)   Activity/Exercise/Self-Care Comment IND w/ ADLs, assist from daughter and grandkids for carrying things into home and ride for groceries   General Information   Onset of Illness/Injury or Date of Surgery 08/14/24   Referring Physician Freddie Ramírez MD   Patient/Family Therapy Goals Statement (PT) pain controlled, get back to moving how she was   Pertinent History of Current Problem (include personal factors and/or comorbidities that impact the POC) Jenn Aparicio is a 69 year old female admitted on 8/14/2024 after presenting to ED w/ L ankle pain and swelling after tripping over oxygen tubing on Monday 8/12. She has PMH of O2 dependent COPD (2L O2 @ rest and 3L with exertion), Lung adenocarcinoma s/p RLL lobectomy (2/2016), SBRT to RUL nodule (1/2020) DMII c/b peripheral neuropathy, and HTN, HLD   Existing Precautions/Restrictions fall   Weight-Bearing Status - LUE full weight-bearing   Weight-Bearing Status - RUE full weight-bearing   Weight-Bearing Status - LLE weight-bearing as tolerated   Weight-Bearing Status - RLE full weight-bearing   Heart Disease Risk Factors Medical history   Cognition   Affect/Mental Status (Cognition) WFL   Orientation Status  (Cognition) oriented x 4   Follows Commands (Cognition) WFL   Pain Assessment   Patient Currently in Pain Yes, see Vital Sign flowsheet  (significant pain to low back, and L foot)   Integumentary/Edema   Integumentary/Edema Comments BL LE edema   Posture    Posture Kyphosis   Posture Comments kyphotic posture in seated   Range of Motion (ROM)   Range of Motion ROM deficits secondary to pain   ROM Comment L ankle and knee AROM iimited by pain, resisitve to PROM for knee extension in seated and ankle ROM due to pain   Strength (Manual Muscle Testing)   Strength (Manual Muscle Testing) Deficits observed during functional mobility   Strength Comments pt grossly deconditioned, unable to complete R SAQ due to pain, able to complete L SAQ; unable to transition supine to seated w/o physical assistance due to strength impairment and pain   Bed Mobility   Comment, (Bed Mobility) mod A supine to seated w/ log roll; unable to complete supine to seated pivot w/ max A   Transfers   Comment, (Transfers) not asssesed due to pain   Gait/Stairs (Locomotion)   Comment, (Gait/Stairs) not assessed due to pain   Balance   Balance Comments SBA seated EOB balance   Sensory Examination   Sensory Perception Comments numbness to L foot, unable to feel light touch on exam, reprots began 1 week prior; R foot sensation in tact   Clinical Impression   Criteria for Skilled Therapeutic Intervention Yes, treatment indicated   PT Diagnosis (PT) pt presents with impairments in independence with all functional mobility, strength, L LE ROM, and activity tolerance   Influenced by the following impairments pain, deconditioning, O2 dependence, medical status   Functional limitations due to impairments all functional mobility, transfers, ambulation, activity tolerance   Clinical Presentation (PT Evaluation Complexity) stable   Clinical Presentation Rationale per clinical judgement   Clinical Decision Making (Complexity) low complexity   Planned Therapy  Interventions (PT) balance training;bed mobility training;gait training;neuromuscular re-education;ROM (range of motion);stair training;strengthening;transfer training   Risk & Benefits of therapy have been explained evaluation/treatment results reviewed;care plan/treatment goals reviewed;current/potential barriers reviewed;patient   PT Total Evaluation Time   PT Eval, Low Complexity Minutes (68770) 10   Physical Therapy Goals   PT Frequency 5x/week   PT Predicted Duration/Target Date for Goal Attainment 08/29/24   PT Goals Bed Mobility;Transfers;Gait   PT: Bed Mobility Independent;Supine to/from sit   PT: Transfers Modified independent;Sit to/from stand;Assistive device   PT: Gait Modified independent;100 feet;Assistive device   Interventions   Interventions Quick Adds Therapeutic Activity   Therapeutic Activity   Therapeutic Activities: dynamic activities to improve functional performance Minutes (18062) 20   Symptoms Noted During/After Treatment Increased pain   Treatment Detail/Skilled Intervention Pt supine in bed at arrival, agreeable to PT session though hesitant about movement. In significant discomfort at arrival, assisted to reposition with pillows and adjusting HOB. Increased time for equipment retrieval and room set up, FWW sized and placed in pt room. Educated pt on goals of PT session to trial OOB mobility, pt hesitant and continuing to have significant pain worse in low back than L foot, but with encouragement agreeable to try. Attempted supine to seated EOB pivot with assist to manage L LE and max A at trunk, pt able to come partially into seated but not fully. Second attempt at supine to seated using log roll, vcing provided to sequence movements and mod A able to roll L w/ UE pull and assist to manage LEs, then HOB elevated and assist at trunk to come into seated. x 5 minutes seated, pulse maintains between 110 - 120 during seated EOB. Pt able to complete x3 LAQ on R LE, unable to complete L LAQ  "and resistive to PROM due to pain. Attempted ankle pumps, pt able to DF/PF a few degrees BL but not achieve full ROM. Encouraged and educated for trialing STS w/ FWW, pt in too much pain at this time stating it would be \"excruciating\". Pt describes pain as sharp pains at low back that radiate down L LE to foot, has not been able to sleep flat at home and has been sleeping in recliner. Pivot EOB to supine, pt increased independence with this only needign min-mod A to assist L LE into bed, then mod A to scoot upwards in bed. Pt ends supine in bed, all needs addressed/within reach, updated on PT POC.   PT Discharge Planning   PT Plan progress bed mobility, initiate STS and ambulation as tolerated, LE strengthening exercises   PT Discharge Recommendation (DC Rec) Transitional Care Facility   PT Rationale for DC Rec Pt not tolerating out of bed mobility at this time, limited significantly by low back pain and L foot pain, and mod A x 1 for bed mobility. Mobility wise, pt will need TCU stay to progress all functional mobility and strength before able to safey return to independent living.   PT Brief overview of current status mod A bed mobility   Total Session Time   Timed Code Treatment Minutes 20   Total Session Time (sum of timed and untimed services) 30     "

## 2024-08-15 NOTE — PROGRESS NOTES
[facility]    Medicine Progress Note - Hospitalist Service, GOLD TEAM 22    Date of Admission:  8/14/2024    Assessment & Plan      Jenn Aparicio is a 69 year old female admitted on 8/14/2024. She has PMH of O2 dependent COPD (2L O2 @ rest and 3L with exertion), Lung adenocarcinoma s/p RLL lobectomy (2/2016), SBRT to RUL nodule (1/2020) DMII c/b peripheral neuropathy, and HTN, HLD,who presents to the ED for evaluation of left ankle pain and swelling. Patient admitted to Medicine for further evaluation and care.     # Acute worsening low back pain:    - history of chronic low back pain and imaging from 6/26/24 showed possible metastatic lesion.   - patient reiterates that her back pain has worsened, with radiation down her L LE with weakness and that contributed to her ankle twisting  - uncontrolled pain and increased oxydocone from 2.5-5 to 5-10 and adding IV dilaudid for breakthrough pain  - will also check MRI lumbar spine to rule out worsening metastatic spine disease, myelopathy. If concerning findings, will consult neurosurgery  - rest as below    # Left ankle pain and swelling  # difficulty ambulating  Presents with ~ 3 days hx of left ankle pain and swelling with inability ambulate. She is unsure if injury occurred, though could have 'rolled' her ankle on her O2 tubing. Pain is reported as constant and sharp. Patient also endorses  LL foot and toe paraesthesia. No fevers, chills, or previous surgery to Left ankle.  Xray on admission w/o acute osseus abnormality, though with soft tissue swelling over dorsum of foot and medial ankle. LLE US w/o DVT. Possible sprain vs infection vs other. WBC 12.2, , ESR 38  - oxycodone 2.5 mg -5.0 mg q4h PRN for pain,  PT/OT, Fall precautions   Blood culture and UA  - consulted Ortho service and no acute intervention planned. No signs of septic arthritis and conservative management planned    ## Severe O2 dependent COPD:   ##  Chronic Hypoxic respiratory failure  Baseline O2 requirements of 2L at rest and 3L w/ exertion, currently on 2L.  Home medications include: PRN albuterol, duoneb, mucomyst, Trelegy ellipta, and Roflumilast (patient states she only takes PRN- did not order, will need to clarify) . PFT s 4/21/23. FEV1 0.55 (24%), FVC 1.36 (46 %), DLCOunc 3.68 (17%).   - oxygen to keep O2 Saturation 90-92%  - Continue PTA medications:   - Follow up with Pulmonology as scheduled    ## Poorly controlled DMII: Hgb A1C (8.7- 7/9/24). Home medications include jaridance and weekly tirzepatide.   - HOLD PTA medications  - LSSI TID with meals and qhs  - MOD CHO diet  - BG checks TID with meals and qhs     ## HTN: Blood pressure mildly elevated  on admission at 150/84.  Home medications include losartan.  - Continue PTA medication    ## NSCLC, adenocarcinoma- RUL,RLL  ## S/p RLL lobectomy (2/2016): Dx 1/2015. STAGE: IA2 [id number] poorly diff adeno RLL, then [id number] IA2 suspected NSCLC RUL, medically inoperable S/p RLL lobectomy (2/2016) and SBRT to RUL nodule (1/2020). Follows with Dr. Leung with recent clinic visit 7/29/24 w/ plan for 3 months follow up.  - Follow up with Oncology as scheduled    ## Chronic LBP: PTA Cymbalta, lyrica. Reports increase in sx.   - Continue PTA Medications  - Lidocaine patch PRN          Diet: Moderate Consistent Carb (60 g CHO per Meal) Diet    DVT Prophylaxis: Low Risk/Ambulatory with no VTE prophylaxis indicated  Andrade Catheter: Not present  Lines: None     Cardiac Monitoring: None  Code Status: Full Code    Clinically Significant Risk Factors Present on Admission                # Drug Induced Platelet Defect: home medication list includes an antiplatelet medication   # Hypertension: Home medication list includes antihypertensive(s)        # DMII: A1C = 8.7 % (Ref range: <5.7 %) within past 6 months    # Obesity: Estimated body mass index is 32.74 kg/m  as calculated from the following:    Height as of  "7/29/24: 1.676 m (5' 6\").    Weight as of this encounter: 92 kg (202 lb 13.2 oz).         # Financial/Environmental Concerns: none           Disposition Plan     Medically Ready for Discharge: Anticipated in 2-4 Days             Freddie Ramírez MD  Hospitalist Service, GOLD TEAM 22  [facility]  Securely message with [employer] (more info)  Text page via [id number] Paging/Directory   See signed in provider for up to date coverage information  ______________________________________________________________________    Interval History     Reports that her low back pain is the major issue and is worsening, radiating down her L LE and now with weakness    Physical Exam   Vital Signs: Temp: 98.8  F (37.1  C) Temp src: Oral BP: (!) 149/78 Pulse: 101   Resp: 18 SpO2: 99 % O2 Device: Nasal cannula Oxygen Delivery: 2 LPM  Weight: 202 lbs 13.17 oz    Physical Exam  Constitutional:       Appearance: Normal appearance.   HENT:      Head: Normocephalic.      Mouth/Throat:      Mouth: Mucous membranes are moist.   Eyes:      Extraocular Movements: Extraocular movements intact.      Pupils: Pupils are equal, round, and reactive to light.   Cardiovascular:      Rate and Rhythm: Normal rate and regular rhythm.      Heart sounds: No murmur heard.  Pulmonary:      Effort: Pulmonary effort is normal. No respiratory distress.      Breath sounds: Normal breath sounds. No wheezing.   Abdominal:      General: Abdomen is flat. Bowel sounds are normal.      Tenderness: There is no abdominal tenderness.   Musculoskeletal:      Cervical back: Normal range of motion.   Neurological:      Mental Status: She is alert and oriented to person, place, and time.      Comments: L LE power 3-4/5 proximal, limited by pain. R LE appears WNL           Medical Decision Making       55 MINUTES SPENT BY ME on the date of service doing chart review, history, exam, documentation & further activities per the note.      Data "     I have personally reviewed the following data over the past 24 hrs:    10.3  \   12.8   / 249     139 100 12.0 /  146 (H)   3.6 25 0.50 (L) \     Procal: 0.07 CRP: 205.00 (H) Lactic Acid: 0.8       INR:  1.15 PTT:  N/A   D-dimer:  N/A Fibrinogen:  N/A       Imaging results reviewed over the past 24 hrs:   Recent Results (from the past 24 hour(s))   US Lower Extremity Venous Duplex Bilateral    Narrative    EXAMINATION: DOPPLER VENOUS ULTRASOUND OF BILATERAL LOWER EXTREMITIES,  8/14/2024 5:33 PM     COMPARISON: None.    HISTORY: leg pain and swelling    TECHNIQUE:  Gray-scale evaluation with compression, spectral flow and  color Doppler assessment of the deep venous system of both legs from  groin to knee, and then at the ankles.    FINDINGS:  In both lower extremities, the common femoral, femoral, popliteal and  posterior tibial veins demonstrate normal compressibility and blood  flow.      Impression    IMPRESSION:  1.  No evidence of deep venous thrombosis in either lower extremity.    I have personally reviewed the examination and initial interpretation  and I agree with the findings.    CHARLOTTE KAPADIA DO         SYSTEM ID:  [id number]

## 2024-08-15 NOTE — PLAN OF CARE
"Goal Outcome Evaluation:  Plan of Care Reviewed With: patient    Overall Patient Progress: no changeOverall Patient Progress: no change    Shift [id number]:  VS: Temp: 98.1  F (36.7  C) Temp src: Oral BP: (!) 143/80 Pulse: 110   Resp: 18 SpO2: 100 % O2 Device: None (Room air) Oxygen Delivery: 2 LPM    O2: Pt is stable on 2 L, O2, Denies chest pain and SOB.    Output: Pt is continent of both bowel and bladder , due to left ankle pain,pt requested Pure wick in placed.    Last BM:  Pt stated \"LBM 3 days ago\"   Activity: Stand by assist due to pain on left ankle.   Skin: Intact, but Left leg ankle swelling   Pain: 8/10 managed with scheduled meds.    Neuro: A&Ox4. Pt denies numbness and tingling in all extremities.    Dressing: None   Diet: Regular diet.   LDA: L PIV SL   Equipment: Call light within reach and personal belongings.    Plan: Continue to monitor.   Additional Info: UA sample wasn't able to collect in this shift Pt want to do in the morning.     "

## 2024-08-15 NOTE — PROGRESS NOTES
Cross Cover    Notified that HR on monitor >150. Appreciate nurse's assistance in checking radial pulse at bedside which was 100.     On review of chart, HR has been high 90s-100s. EKG sinus rhythm. No hx of afib.     Likely this is her baseline. Will obtain EKG to make sure sinus rhythm since she has not had one this admission.     Mirtha Domingo MD (Sally)  Internal Medicine/Pediatrics  Hospitalist

## 2024-08-16 ENCOUNTER — APPOINTMENT (OUTPATIENT)
Dept: PHYSICAL THERAPY | Facility: CLINIC | Age: 69
DRG: 551 | End: 2024-08-16
Payer: COMMERCIAL

## 2024-08-16 PROBLEM — G89.29 CHRONIC LEFT-SIDED LOW BACK PAIN WITH LEFT-SIDED SCIATICA: Chronic | Status: ACTIVE | Noted: 2024-08-16

## 2024-08-16 PROBLEM — J96.11 CHRONIC RESPIRATORY FAILURE WITH HYPOXIA (H): Chronic | Status: ACTIVE | Noted: 2024-08-16

## 2024-08-16 PROBLEM — M54.42 CHRONIC LEFT-SIDED LOW BACK PAIN WITH LEFT-SIDED SCIATICA: Chronic | Status: ACTIVE | Noted: 2024-08-16

## 2024-08-16 PROBLEM — J96.21 ACUTE AND CHRONIC RESPIRATORY FAILURE WITH HYPOXIA (H): Status: RESOLVED | Noted: 2023-01-01 | Resolved: 2024-08-16

## 2024-08-16 LAB
ANION GAP SERPL CALCULATED.3IONS-SCNC: 17 MMOL/L (ref 7–15)
BUN SERPL-MCNC: 9.5 MG/DL (ref 8–23)
CALCIUM SERPL-MCNC: 9.1 MG/DL (ref 8.8–10.4)
CHLORIDE SERPL-SCNC: 99 MMOL/L (ref 98–107)
CREAT SERPL-MCNC: 0.49 MG/DL (ref 0.51–0.95)
EGFRCR SERPLBLD CKD-EPI 2021: >90 ML/MIN/1.73M2
ERYTHROCYTE [DISTWIDTH] IN BLOOD BY AUTOMATED COUNT: 12.3 % (ref 10–15)
GLUCOSE BLDC GLUCOMTR-MCNC: 124 MG/DL (ref 70–99)
GLUCOSE BLDC GLUCOMTR-MCNC: 131 MG/DL (ref 70–99)
GLUCOSE BLDC GLUCOMTR-MCNC: 137 MG/DL (ref 70–99)
GLUCOSE BLDC GLUCOMTR-MCNC: 144 MG/DL (ref 70–99)
GLUCOSE BLDC GLUCOMTR-MCNC: 169 MG/DL (ref 70–99)
GLUCOSE SERPL-MCNC: 146 MG/DL (ref 70–99)
HCO3 SERPL-SCNC: 21 MMOL/L (ref 22–29)
HCT VFR BLD AUTO: 39.6 % (ref 35–47)
HGB BLD-MCNC: 12.8 G/DL (ref 11.7–15.7)
MAGNESIUM SERPL-MCNC: 1.9 MG/DL (ref 1.7–2.3)
MCH RBC QN AUTO: 28.1 PG (ref 26.5–33)
MCHC RBC AUTO-ENTMCNC: 32.3 G/DL (ref 31.5–36.5)
MCV RBC AUTO: 87 FL (ref 78–100)
PHOSPHATE SERPL-MCNC: 1.9 MG/DL (ref 2.5–4.5)
PLATELET # BLD AUTO: 238 10E3/UL (ref 150–450)
POTASSIUM SERPL-SCNC: 4.1 MMOL/L (ref 3.4–5.3)
RBC # BLD AUTO: 4.55 10E6/UL (ref 3.8–5.2)
SODIUM SERPL-SCNC: 137 MMOL/L (ref 135–145)
TROPONIN T SERPL HS-MCNC: 18 NG/L
WBC # BLD AUTO: 10.7 10E3/UL (ref 4–11)

## 2024-08-16 PROCEDURE — 84484 ASSAY OF TROPONIN QUANT: CPT

## 2024-08-16 PROCEDURE — 97110 THERAPEUTIC EXERCISES: CPT | Mod: GP | Performed by: PHYSICAL THERAPIST

## 2024-08-16 PROCEDURE — 82248 BILIRUBIN DIRECT: CPT | Performed by: NURSE PRACTITIONER

## 2024-08-16 PROCEDURE — 250N000013 HC RX MED GY IP 250 OP 250 PS 637

## 2024-08-16 PROCEDURE — 84145 PROCALCITONIN (PCT): CPT | Performed by: NURSE PRACTITIONER

## 2024-08-16 PROCEDURE — 120N000002 HC R&B MED SURG/OB UMMC

## 2024-08-16 PROCEDURE — 250N000009 HC RX 250: Performed by: NURSE PRACTITIONER

## 2024-08-16 PROCEDURE — 258N000003 HC RX IP 258 OP 636: Performed by: HOSPITALIST

## 2024-08-16 PROCEDURE — 97530 THERAPEUTIC ACTIVITIES: CPT | Mod: GP | Performed by: PHYSICAL THERAPIST

## 2024-08-16 PROCEDURE — 250N000013 HC RX MED GY IP 250 OP 250 PS 637: Performed by: PHYSICIAN ASSISTANT

## 2024-08-16 PROCEDURE — 80048 BASIC METABOLIC PNL TOTAL CA: CPT | Performed by: HOSPITALIST

## 2024-08-16 PROCEDURE — 250N000011 HC RX IP 250 OP 636: Performed by: HOSPITALIST

## 2024-08-16 PROCEDURE — 250N000011 HC RX IP 250 OP 636: Performed by: PHYSICIAN ASSISTANT

## 2024-08-16 PROCEDURE — 99232 SBSQ HOSP IP/OBS MODERATE 35: CPT | Performed by: HOSPITALIST

## 2024-08-16 PROCEDURE — 84100 ASSAY OF PHOSPHORUS: CPT

## 2024-08-16 PROCEDURE — 250N000013 HC RX MED GY IP 250 OP 250 PS 637: Performed by: HOSPITALIST

## 2024-08-16 PROCEDURE — 83735 ASSAY OF MAGNESIUM: CPT

## 2024-08-16 PROCEDURE — 93010 ELECTROCARDIOGRAM REPORT: CPT | Performed by: INTERNAL MEDICINE

## 2024-08-16 PROCEDURE — 36415 COLL VENOUS BLD VENIPUNCTURE: CPT

## 2024-08-16 PROCEDURE — 80048 BASIC METABOLIC PNL TOTAL CA: CPT

## 2024-08-16 PROCEDURE — 36415 COLL VENOUS BLD VENIPUNCTURE: CPT | Performed by: HOSPITALIST

## 2024-08-16 PROCEDURE — 99291 CRITICAL CARE FIRST HOUR: CPT | Performed by: NURSE PRACTITIONER

## 2024-08-16 PROCEDURE — 85027 COMPLETE CBC AUTOMATED: CPT | Performed by: HOSPITALIST

## 2024-08-16 RX ORDER — PREGABALIN 200 MG/1
200 CAPSULE ORAL 2 TIMES DAILY
Qty: 60 CAPSULE | Refills: 0 | Status: SHIPPED | OUTPATIENT
Start: 2024-08-16 | End: 2024-10-02

## 2024-08-16 RX ORDER — METOPROLOL TARTRATE 1 MG/ML
5 INJECTION, SOLUTION INTRAVENOUS EVERY 5 MIN PRN
Status: DISCONTINUED | OUTPATIENT
Start: 2024-08-16 | End: 2024-08-26 | Stop reason: HOSPADM

## 2024-08-16 RX ORDER — DIPHENHYDRAMINE HCL 25 MG
25 CAPSULE ORAL
Status: COMPLETED | OUTPATIENT
Start: 2024-08-16 | End: 2024-08-16

## 2024-08-16 RX ORDER — MAGNESIUM SULFATE HEPTAHYDRATE 40 MG/ML
2 INJECTION, SOLUTION INTRAVENOUS ONCE
Status: COMPLETED | OUTPATIENT
Start: 2024-08-17 | End: 2024-08-17

## 2024-08-16 RX ORDER — ACETAMINOPHEN 325 MG/1
650 TABLET ORAL EVERY 4 HOURS PRN
DISCHARGE
Start: 2024-08-16 | End: 2024-08-26

## 2024-08-16 RX ORDER — LIDOCAINE HYDROCHLORIDE 20 MG/ML
JELLY TOPICAL ONCE
Status: DISCONTINUED | OUTPATIENT
Start: 2024-08-17 | End: 2024-08-26 | Stop reason: HOSPADM

## 2024-08-16 RX ORDER — OXYCODONE HYDROCHLORIDE 5 MG/1
2.5-5 TABLET ORAL EVERY 4 HOURS PRN
Qty: 30 TABLET | Refills: 0 | Status: SHIPPED | OUTPATIENT
Start: 2024-08-16 | End: 2024-09-26

## 2024-08-16 RX ADMIN — ONDANSETRON 4 MG: 4 TABLET, ORALLY DISINTEGRATING ORAL at 03:11

## 2024-08-16 RX ADMIN — POLYETHYLENE GLYCOL 400 AND PROPYLENE GLYCOL 1 DROP: 4; 3 SOLUTION/ DROPS OPHTHALMIC at 12:49

## 2024-08-16 RX ADMIN — UMECLIDINIUM 1 PUFF: 62.5 AEROSOL, POWDER ORAL at 08:13

## 2024-08-16 RX ADMIN — METOPROLOL TARTRATE 5 MG: 5 INJECTION, SOLUTION INTRAVENOUS at 23:44

## 2024-08-16 RX ADMIN — SODIUM CHLORIDE, POTASSIUM CHLORIDE, SODIUM LACTATE AND CALCIUM CHLORIDE: 600; 310; 30; 20 INJECTION, SOLUTION INTRAVENOUS at 13:10

## 2024-08-16 RX ADMIN — OXYCODONE HYDROCHLORIDE 10 MG: 10 TABLET ORAL at 03:11

## 2024-08-16 RX ADMIN — CARBOXYMETHYLCELLULOSE SODIUM 1 DROP: 10 GEL OPHTHALMIC at 20:38

## 2024-08-16 RX ADMIN — ATORVASTATIN CALCIUM 10 MG: 10 TABLET, FILM COATED ORAL at 08:08

## 2024-08-16 RX ADMIN — SENNOSIDES AND DOCUSATE SODIUM 2 TABLET: 50; 8.6 TABLET ORAL at 20:37

## 2024-08-16 RX ADMIN — CARBOXYMETHYLCELLULOSE SODIUM 1 DROP: 10 GEL OPHTHALMIC at 17:31

## 2024-08-16 RX ADMIN — DIPHENHYDRAMINE HYDROCHLORIDE 25 MG: 25 CAPSULE ORAL at 21:57

## 2024-08-16 RX ADMIN — PREGABALIN 200 MG: 100 CAPSULE ORAL at 20:38

## 2024-08-16 RX ADMIN — FLUTICASONE FUROATE AND VILANTEROL TRIFENATATE 1 PUFF: 200; 25 POWDER RESPIRATORY (INHALATION) at 08:12

## 2024-08-16 RX ADMIN — LIDOCAINE 2 PATCH: 4 PATCH TOPICAL at 20:37

## 2024-08-16 RX ADMIN — ASPIRIN 81 MG: 81 TABLET, COATED ORAL at 08:07

## 2024-08-16 RX ADMIN — CARBOXYMETHYLCELLULOSE SODIUM 1 DROP: 10 GEL OPHTHALMIC at 08:09

## 2024-08-16 RX ADMIN — CETIRIZINE HYDROCHLORIDE 10 MG: 10 TABLET, FILM COATED ORAL at 08:07

## 2024-08-16 RX ADMIN — OXYCODONE HYDROCHLORIDE 10 MG: 10 TABLET ORAL at 17:36

## 2024-08-16 RX ADMIN — CYANOCOBALAMIN TAB 500 MCG 500 MCG: 500 TAB at 08:08

## 2024-08-16 RX ADMIN — LOSARTAN POTASSIUM 50 MG: 50 TABLET, FILM COATED ORAL at 08:07

## 2024-08-16 RX ADMIN — CARBOXYMETHYLCELLULOSE SODIUM 1 DROP: 10 GEL OPHTHALMIC at 12:49

## 2024-08-16 RX ADMIN — POLYETHYLENE GLYCOL 400 AND PROPYLENE GLYCOL 1 DROP: 4; 3 SOLUTION/ DROPS OPHTHALMIC at 17:31

## 2024-08-16 RX ADMIN — DULOXETINE HYDROCHLORIDE 30 MG: 30 CAPSULE, DELAYED RELEASE ORAL at 20:38

## 2024-08-16 RX ADMIN — OXYCODONE HYDROCHLORIDE 10 MG: 10 TABLET ORAL at 12:53

## 2024-08-16 RX ADMIN — SODIUM CHLORIDE, POTASSIUM CHLORIDE, SODIUM LACTATE AND CALCIUM CHLORIDE: 600; 310; 30; 20 INJECTION, SOLUTION INTRAVENOUS at 04:55

## 2024-08-16 RX ADMIN — PREGABALIN 200 MG: 100 CAPSULE ORAL at 08:05

## 2024-08-16 RX ADMIN — OMEPRAZOLE 20 MG: 20 CAPSULE, DELAYED RELEASE ORAL at 08:08

## 2024-08-16 RX ADMIN — SENNOSIDES AND DOCUSATE SODIUM 2 TABLET: 50; 8.6 TABLET ORAL at 08:06

## 2024-08-16 RX ADMIN — FLUTICASONE PROPIONATE 1 SPRAY: 50 SPRAY, METERED NASAL at 08:13

## 2024-08-16 RX ADMIN — POLYETHYLENE GLYCOL 400 AND PROPYLENE GLYCOL 1 DROP: 4; 3 SOLUTION/ DROPS OPHTHALMIC at 20:38

## 2024-08-16 RX ADMIN — FLUTICASONE PROPIONATE 1 SPRAY: 50 SPRAY, METERED NASAL at 20:46

## 2024-08-16 RX ADMIN — DULOXETINE HYDROCHLORIDE 30 MG: 30 CAPSULE, DELAYED RELEASE ORAL at 08:06

## 2024-08-16 RX ADMIN — POLYETHYLENE GLYCOL 400 AND PROPYLENE GLYCOL 1 DROP: 4; 3 SOLUTION/ DROPS OPHTHALMIC at 08:13

## 2024-08-16 RX ADMIN — HYDROMORPHONE HYDROCHLORIDE 0.3 MG: 1 INJECTION, SOLUTION INTRAMUSCULAR; INTRAVENOUS; SUBCUTANEOUS at 20:34

## 2024-08-16 ASSESSMENT — ACTIVITIES OF DAILY LIVING (ADL)
ADLS_ACUITY_SCORE: 27
ADLS_ACUITY_SCORE: 28
ADLS_ACUITY_SCORE: 27
ADLS_ACUITY_SCORE: 28
ADLS_ACUITY_SCORE: 27
ADLS_ACUITY_SCORE: 28
ADLS_ACUITY_SCORE: 28

## 2024-08-16 NOTE — PLAN OF CARE
VS: BP (!) 148/65 (BP Location: Left arm)   Pulse 102   Temp 98.7  F (37.1  C) (Oral)   Resp 16   Wt 92.9 kg (204 lb 12.9 oz)   SpO2 97%   BMI 33.06 kg/m       O2: 97% on RA no complaints of SOB or chest pain.   Output: Purewick in place.   Last BM: 8/12. Scheduled bowel medications given.   Activity: Ax2 with cares.    Skin: Intact.   Pain: C/O left leg and back pain managed with PRN medication.   CMS: A&Ox4. Able to make needs known.   Dressing: None.   Diet: Regular, tolerating well. No complaints of nausea or vomiting.   LDA: RPIV running LR 125ml/hr.   Equipment: Personal belongings   Plan: Continue with POC.   Additional Info:

## 2024-08-16 NOTE — PROGRESS NOTES
Care Management Follow Up    Length of Stay (days): 2    Expected Discharge Date: 08/19/2024     Concerns to be Addressed: discharge planning     Patient plan of care discussed at interdisciplinary rounds: Yes    Anticipated Discharge Disposition: Transitional Care     Anticipated Discharge Services:  (Post Acute Therapies)  Anticipated Discharge DME:  (SNF to provide all necessary DME)    Patient/family educated on Medicare website which has current facility and service quality ratings: yes  Education Provided on the Discharge Plan: Yes  Patient/Family in Agreement with the Plan: yes    Referrals Placed by CM/SW: Post Acute Facilities    [facility]  [address] [place]  [address]  P: [phone]  F: [phone]  Admissions: [phone]   8/16: Initial referral sent    [facility]  [address] [place], [place] [address] [phone]   8/16: Initial referral sent    [facility]  [address] [place] [address]  [address]  P: [phone]  P: [phone] - Admissions  F: [phone]   8/16: Initial referral sent    [facility]  [address] [place]  [address]  P: [phone]  Admissions: [phone]  F: [phone]   F: [phone]   8/16: Initial referral sent    [facility]  [address] [place] [address]  P: [phone] - Liaison (Melissa)  F: [phone]   8/16: Initial referral sent    Private pay costs discussed: insurance costs co-pays    Additional Information:  Per IDT meeting, patient likely to be medically ready tomorrow. Patient gave SW a list of preferred TCU facilities. SW made referrals (see above).    Pt accepted for TCU at [facility]. See SW Lily Garcia's note for discharge details.     ROB Wang  Float   [facility]  Available via Vocera  Training with ODILON Schaffer, 8A  P: [phone]

## 2024-08-16 NOTE — DISCHARGE SUMMARY
"[facility]  Hospitalist Discharge Summary      Date of Admission:  8/14/2024  Date of Discharge:  8/16/2024  Discharging Provider: Freddie Ramírez MD  Discharge Service: Hospitalist Service, GOLD TEAM 22    Chief Complaint/Reason for admission:  Chronic left-sided low back pain with left-sided sciatica       Present on Admission:   Failure to thrive in adult   Acute left ankle pain   Chronic respiratory failure with hypoxia (H)   Chronic left-sided low back pain with left-sided sciatica      Discharge Diagnoses  Principal Problem:    Chronic left-sided low back pain with left-sided sciatica  Active Problems:    Failure to thrive in adult    Acute left ankle pain    Chronic respiratory failure with hypoxia (H)          Clinically Significant Risk Factors     # DMII: A1C = 8.7 % (Ref range: <5.7 %) within past 6 months    # Obesity: Estimated body mass index is 33.06 kg/m  as calculated from the following:    Height as of 7/29/24: 1.676 m (5' 6\").    Weight as of this encounter: 92.9 kg (204 lb 12.9 oz).       Follow-ups Needed After Discharge   Follow-up Appointments     Follow Up and recommended labs and tests      Follow up with Nursing home physician.  No follow up labs or test are   needed.  Please follow up with Neurosurgery as outpatient            Unresulted Labs Ordered in the Past 30 Days of this Admission       Date and Time Order Name Status Description    8/14/2024  8:42 PM Blood Culture Arm, Left Preliminary         These results will be followed up by     Discharge Disposition   Discharged to short-term care facility  Condition at discharge: Stable    Hospital Course   Jenn Aparicio is a 69 year old female admitted on 8/14/2024. She has PMH of O2 dependent COPD (2L O2 @ rest and 3L with exertion), Lung adenocarcinoma s/p RLL lobectomy (2/2016), SBRT to RUL nodule (1/2020) DMII c/b peripheral neuropathy, and HTN, HLD,who presents to the ED for evaluation of " left ankle pain and swelling. Patient admitted to Medicine for further evaluation and care.     # Acute worsening low back pain:    - history of chronic low back pain and imaging from 6/26/24 showed possible metastatic lesion.   - patient reiterates that her back pain has worsened, with radiation down her L LE with weakness and that contributed to her ankle twisting  - uncontrolled pain and increased oxydocone from 2.5-5 to 5-10 and adding IV dilaudid for breakthrough pain  - Checked MRI lumbar spine to rule out worsening metastatic spine disease, myelopathy and showed stable symptoms/. D/W Neurosurgery service and recommended outpatient follow up  Disp: Discharge to TCU and outpatient follow up . Oral PRN narcotics ordered    # Left ankle pain and swelling  # difficulty ambulating  Presents with ~ 3 days hx of left ankle pain and swelling with inability ambulate. She is unsure if injury occurred, though could have 'rolled' her ankle on her O2 tubing. Pain is reported as constant and sharp. Patient also endorses  LL foot and toe paraesthesia. No fevers, chills, or previous surgery to Left ankle.  Xray on admission w/o acute osseus abnormality, though with soft tissue swelling over dorsum of foot and medial ankle. LLE US w/o DVT. Possible sprain vs infection vs other. WBC 12.2, , ESR 38  - oxycodone 2.5 mg -5.0 mg q4h PRN for pain,  PT/OT, Fall precautions   Blood culture and UA  - consulted Ortho service and no acute intervention planned. No signs of septic arthritis and conservative management planned  Dispo: Discharge to TCU    ## Severe O2 dependent COPD:   ## Chronic Hypoxic respiratory failure  Baseline O2 requirements of 2L at rest and 3L w/ exertion, currently on 2L.  Home medications include: PRN albuterol, duoneb, mucomyst, Trelegy ellipta, and Roflumilast (patient states she only takes PRN- did not order, will need to clarify) . PFT s 4/21/23. FEV1 0.55 (24%), FVC 1.36 (46 %), DLCOunc 3.68 (17%).    - oxygen to keep O2 Saturation 90-92%  - Continue PTA medications:   - Follow up with Pulmonology as scheduled    ## Poorly controlled DMII: Hgb A1C (8.7- 7/9/24). Home medications include jaridance and weekly tirzepatide.   - HOLD PTA medications  - LSSI TID with meals and qhs  - MOD CHO diet  - BG checks TID with meals and qhs     ## HTN: Blood pressure mildly elevated  on admission at 150/84.  Home medications include losartan.  - Continue PTA medication    ## NSCLC, adenocarcinoma- RUL,RLL  ## S/p RLL lobectomy (2/2016): Dx 1/2015. STAGE: IA2 [id number] poorly diff adeno RLL, then [id number] IA2 suspected NSCLC RUL, medically inoperable S/p RLL lobectomy (2/2016) and SBRT to RUL nodule (1/2020). Follows with Dr. Leung with recent clinic visit 7/29/24 w/ plan for 3 months follow up.  - Follow up with Oncology as scheduled    ## Chronic LBP: PTA Cymbalta, lyrica. Reports increase in sx.   - Continue PTA Medications  - Lidocaine patch PRN    Consultations This Hospital Stay   PHYSICAL THERAPY ADULT IP CONSULT  OCCUPATIONAL THERAPY ADULT IP CONSULT  ORTHOPAEDIC SURGERY ADULT/PEDS IP CONSULT  CARE MANAGEMENT / SOCIAL WORK IP CONSULT    Code Status   Full Code    Time Spent on this Encounter   IFreddie MD, personally saw the patient today and spent less than or equal to 30 minutes discharging this patient.       Freddie Ramírez MD  [facility] [address]  [address] [place] [id number]  Phone: [phone]  Fax: [phone]  ______________________________________________________________________    Physical Exam   Vital Signs: Temp: 98.7  F (37.1  C) Temp src: Oral BP: (!) 148/65 Pulse: 102   Resp: 16 SpO2: 97 % O2 Device: None (Room air) Oxygen Delivery: 2 LPM  Weight: 204 lbs 12.92 oz    Constitutional:       Appearance: Normal appearance.   HENT:      Head: Normocephalic.      Mouth/Throat:      Mouth: Mucous membranes are moist.   Eyes:      Extraocular Movements: Extraocular movements intact.       Pupils: Pupils are equal, round, and reactive to light.   Cardiovascular:      Rate and Rhythm: Normal rate and regular rhythm.      Heart sounds: No murmur heard.  Pulmonary:      Effort: Pulmonary effort is normal. No respiratory distress.      Breath sounds: Normal breath sounds. No wheezing.   Abdominal:      General: Abdomen is flat. Bowel sounds are normal.      Tenderness: There is no abdominal tenderness.   Musculoskeletal:      Cervical back: Normal range of motion.   Neurological:      Mental Status: She is alert and oriented to person, place, and time.      Comments: L LE power 4/5 proximal, limited by pain. R LE appears WNL     Primary Care Physician   Mitzi Nettles    Discharge Orders      Adult Neurosurgery [facility] Referral      General info for SNF    Length of Stay Estimate: Short Term Care: Estimated # of Days <30  Condition at Discharge: Stable  Level of care:skilled   Rehabilitation Potential: Good  Admission H&P remains valid and up-to-date: Yes  Recent Chemotherapy: N/A  Use Nursing Home Standing Orders: Yes     Follow Up and recommended labs and tests    Follow up with Nursing home physician.  No follow up labs or test are needed.  Please follow up with Neurosurgery as outpatient     Reason for your hospital stay    You were admitted for acute back pain     Activity - Up ad betsey     Oxygen (SNF/TCU) Discharge     Diet    Follow this diet upon discharge: Orders Placed This Encounter      Moderate Consistent Carb (60 g CHO per Meal) Diet       Significant Results and Procedures   Most Recent 3 CBC's:  Recent Labs   Lab Test 08/16/24  0700 08/15/24  1101 08/14/24  1911   WBC 10.7 10.3 12.2*   HGB 12.8 12.8 14.1   MCV 87 88 90    249 253     Most Recent 3 BMP's:  Recent Labs   Lab Test 08/16/24  1136 08/16/24  0806 08/16/24  0700 08/15/24  1136 08/15/24  1101 08/14/24  2158 08/14/24  1911   NA  --   --  137  --  139  --  143   POTASSIUM  --   --  4.1  --  3.6  --  4.7   CHLORIDE   --   --  99  --  100  --  99   CO2  --   --  21*  --  25  --  26   BUN  --   --  9.5  --  12.0  --  11.6   CR  --   --  0.49*  --  0.50*  --  0.52   ANIONGAP  --   --  17*  --  14  --  18*   LUIGI  --   --  9.1  --  8.6*  --  9.9   * 137* 146*   < > 152*   < > 96    < > = values in this interval not displayed.     Most Recent 2 LFT's:  Recent Labs   Lab Test 05/15/24  1024 01/18/24  1540   AST 19 17   ALT 12 9   ALKPHOS 92 101   BILITOTAL 0.3 0.2     Most Recent 3 INR's:  Recent Labs   Lab Test 08/15/24  1101 07/22/24  0933 02/03/23  2323   INR 1.15 1.01 1.02     Most Recent TSH and T4:  Recent Labs   Lab Test 10/26/23  0912   TSH 3.79     Most Recent Hemoglobin A1c:  Recent Labs   Lab Test 07/09/24  1039   A1C 8.7*     Most Recent 6 glucoses:  Recent Labs   Lab Test 08/16/24  1136 08/16/24  0806 08/16/24  0700 08/16/24  0123 08/15/24  2009 08/15/24  1711   * 137* 146* 144* 128* 131*     Most Recent Urinalysis:  Recent Labs   Lab Test 07/09/24  1045 06/13/23  1717   COLOR Light Yellow Yellow   APPEARANCE Clear Clear   URINEGLC 300* >=1000*   URINEBILI Negative Negative   URINEKETONE Negative Negative   SG 1.030 <=1.005   UBLD Negative Negative   URINEPH 5.5 7.0   PROTEIN Negative Negative   UROBILINOGEN  --  4.0*   NITRITE Negative Negative   LEUKEST Negative Negative   RBCU 0 0-2   WBCU 0 0-5     Most Recent ESR & CRP:  Recent Labs   Lab Test 08/14/24  1911 05/22/22  0501   SED 38*  --    CRP  --  <2.9   CRPI 205.00*  --    ,   Results for orders placed or performed during the hospital encounter of 08/14/24   XR Ankle Left G/E 3 Views    Narrative    3 views left ankle radiographs 8/14/2024 3:09 PM    History: fall, pain    Comparison: No similar prior imaging    Findings:    AP, oblique, and lateral  views of the left ankle were obtained.     No acute osseous abnormality.      Ankle mortise and syndesmosis are congruent on these non weight  bearing images.    Achilles tendon insertional and plantar  calcaneal enthesopathy.     Soft tissue swelling over the dorsum of the foods and medial ankle.      Impression    Impression:  1. No acute osseous abnormality.  2. Soft tissue swelling over the dorsum of the foods and medial ankle.    I have personally reviewed the examination and initial interpretation  and I agree with the findings.    ISRAEL MCFARLAND MD         SYSTEM ID:  [id number]   US Lower Extremity Venous Duplex Bilateral    Narrative    EXAMINATION: DOPPLER VENOUS ULTRASOUND OF BILATERAL LOWER EXTREMITIES,  8/14/2024 5:33 PM     COMPARISON: None.    HISTORY: leg pain and swelling    TECHNIQUE:  Gray-scale evaluation with compression, spectral flow and  color Doppler assessment of the deep venous system of both legs from  groin to knee, and then at the ankles.    FINDINGS:  In both lower extremities, the common femoral, femoral, popliteal and  posterior tibial veins demonstrate normal compressibility and blood  flow.      Impression    IMPRESSION:  1.  No evidence of deep venous thrombosis in either lower extremity.    I have personally reviewed the examination and initial interpretation  and I agree with the findings.    CHARLOTTE KAPADIA DO         SYSTEM ID:  [id number]   MR Lumbar Spine w/o & w Contrast    Narrative    EXAM: MR LUMBAR SPINE W/O & W CONTRAST  8/15/2024 7:16 PM     HISTORY:  Metastatic spinal disease, with worsening low back pain,  with radiation to left leg, left leg weakness       COMPARISON:  MRI lumbar spine 6/26/2024, PET/CT 6/11/2024 and biopsy  images from 7/22/2024.    TECHNIQUE: Sagittal T1-weighted and T2-weighted and axial T2-weighted  images of the lumbar spine were obtained without intravenous contrast.  Post intravenous contrast using gadolinium axial and sagittal  T1-weighted images were obtained with fat saturation.    CONTRAST: 9.2ml gadavist.    FINDINGS:  There are 5 lumbar-type vertebrae, used for the purposes of this  dictation. No acute fractures.  The tip of the conus is  approximately  at the level of L1. Normal cauda equina. No leptomeningeal  enhancement.    Stable grade 1 anterolisthesis L4 on L5. Decreased conspicuity and  enhancement of Schmorl nodes in the superior endplate of L2 with  peripheral enhancement and bone marrow edema compared with MRI from  6/26/2024. Note that this was previously biopsied and the biopsy was  negative for metastatic disease.    On a level by level basis, the findings are as follows:  T11-12: No spinal canal or neural foraminal stenosis. No significant  degenerative changes.    T12-L1: No spinal canal or neural foraminal stenosis. No significant  degenerative changes.    L1-2: No spinal canal or neural foraminal stenosis. Mild facet  arthropathy.    L2-3: Disc bulge and facet hypertrophy with mild spinal canal  narrowing. No neural foraminal stenosis.    L3-4: Disc bulge and facet hypertrophy. Mild spinal canal narrowing.  No foraminal stenosis.    L4-5: Grade 1 anterolisthesis. Superimposed disc bulge and facet  hypertrophy. Thickening of the ligamentum flavum. Resultant moderate  spinal canal stenosis, moderate right and mild left neural foraminal  stenosis.    L5-S1: Disc bulge and facet hypertrophy. No foraminal or spinal canal  stenosis.    The visualized paraspinous soft tissues are within normal limits.     Prominence of the anterior epidural venous plexus at this L3 and L4  levels, similar to prior, likely due to anterolisthesis and spinal  canal narrowing at L4-5 level.      Impression    IMPRESSION:  1. Stable to decreased conspicuity of previously biopsy-proven mildly  inflamed Schmorl node in the superior endplate of L2 endplates. No  evidence of metastatic disease in the lumbar spine. No leptomeningeal  enhancement.  2. Multilevel degenerative findings as detailed above. Most  significantly there is grade 1 anterolisthesis, degenerative disc  disease and facet arthropathy at L4-5 resulting in moderate spinal  canal stenosis and  moderate right neural foraminal stenosis. The  anterior epidural venous plexus is prominent at this level, likely due  to anterolisthesis and spinal canal narrowing.    I have personally reviewed the examination and initial interpretation  and I agree with the findings.    ADRIA CRAWFORD MD         SYSTEM ID:  [id number]       Discharge Medications   Current Discharge Medication List        START taking these medications    Details   acetaminophen (TYLENOL) 325 MG tablet Take 2 tablets (650 mg) by mouth every 4 hours as needed for mild pain or other (and adjunct with moderate or severe pain or per patient request)    Associated Diagnoses: Chronic left-sided low back pain with left-sided sciatica      oxyCODONE (ROXICODONE) 5 MG tablet Take 0.5-1 tablets (2.5-5 mg) by mouth every 4 hours as needed for moderate to severe pain (IF pain not managed with non-pharmacological and non-opioid interventions)  Qty: 30 tablet, Refills: 0    Associated Diagnoses: Chronic left-sided low back pain with left-sided sciatica      umeclidinium (INCRUSE ELLIPTA) 62.5 MCG/ACT inhaler Inhale 1 puff into the lungs daily    Associated Diagnoses: Chronic respiratory failure with hypoxia (H)           CONTINUE these medications which have CHANGED    Details   !! pregabalin (LYRICA) 200 MG capsule Take 1 capsule (200 mg) by mouth 2 times daily for 30 days  Qty: 60 capsule, Refills: 0    Associated Diagnoses: Chronic left-sided low back pain with left-sided sciatica       !! - Potential duplicate medications found. Please discuss with provider.        CONTINUE these medications which have NOT CHANGED    Details   acetylcysteine (MUCOMYST) 10 % nebulizer solution Inhale 4 mLs into the lungs 4 times daily as needed for mucolysis/respiratory distress  Qty: 30 mL, Refills: 5    Associated Diagnoses: Acute and chronic respiratory failure with hypoxia (H)      albuterol (PROAIR HFA/PROVENTIL HFA/VENTOLIN HFA) 108 (90 Base) MCG/ACT inhaler USE 1 OR 2  INHALATIONS EVERY 4 HOURS AS NEEDED  Qty: 17 g, Refills: 11    Comments: Pharmacy may dispense brand covered by insurance (Proair, or proventil or ventolin or generic albuterol inhaler)  Associated Diagnoses: Chronic obstructive pulmonary disease, unspecified COPD type (H)      albuterol (PROVENTIL) (2.5 MG/3ML) 0.083% neb solution Take 1 vial (2.5 mg) by nebulization 4 times daily  Qty: 360 mL, Refills: 5    Associated Diagnoses: COPD exacerbation (H)      alpha-lipoic acid 600 MG capsule Take 1 capsule (600 mg) by mouth daily  Qty: 90 capsule, Refills: 3    Associated Diagnoses: Diabetic polyneuropathy associated with type 2 diabetes mellitus (H)      aspirin 81 MG EC tablet Take 1 tablet (81 mg) by mouth every morning  Qty: 90 tablet, Refills: 3    Associated Diagnoses: Chest pain, unspecified type      atorvastatin (LIPITOR) 10 MG tablet Take 1 tablet (10 mg) by mouth daily  Qty: 90 tablet, Refills: 3    Associated Diagnoses: Hyperlipidemia, unspecified hyperlipidemia type      blood glucose (ONETOUCH ULTRA) test strip       carboxymethylcellulose (REFRESH LIQUIGEL) 1 % ophthalmic solution Place 1 drop into both eyes 4 times daily  Qty: 15 mL, Refills: 11    Associated Diagnoses: Dry eyes      cetirizine (ZYRTEC) 10 MG tablet Take 1 tablet (10 mg) by mouth every morning  Qty: 90 tablet, Refills: 3    Associated Diagnoses: Seasonal allergic rhinitis, unspecified trigger      Continuous Blood Gluc  (DEXCOM G7 ) JOSEPHINE Use to read blood sugars as per 's instructions.  Qty: 1 each, Refills: 0    Associated Diagnoses: Type 2 diabetes mellitus with hyperglycemia, without long-term current use of insulin (H)      Continuous Glucose Sensor (DEXCOM G7 SENSOR) MISC Change every 10 days.  Qty: 3 each, Refills: 5      cyanocobalamin (VITAMIN B-12) 500 MCG tablet Take 1 tablet (500 mcg) by mouth daily  Qty: 90 tablet, Refills: 3    Associated Diagnoses: Vitamin B12 deficiency (non anemic)       cyclobenzaprine (FLEXERIL) 5 MG tablet Take 1 tablet (5 mg) by mouth 3 times daily as needed for muscle spasms  Qty: 30 tablet, Refills: 1    Associated Diagnoses: Muscle spasm      DULoxetine (CYMBALTA) 30 MG capsule Take 1 capsule twice a day.  Qty: 180 capsule, Refills: 3    Associated Diagnoses: Diabetic polyneuropathy associated with type 2 diabetes mellitus (H)      empagliflozin (JARDIANCE) 25 MG TABS tablet Take 1 tablet (25 mg) by mouth every morning  Qty: 90 tablet, Refills: 3      EPINEPHrine (ANY BX GENERIC EQUIV) 0.3 MG/0.3ML injection 2-pack Inject 0.3 mLs (0.3 mg) into the muscle as needed for anaphylaxis (related to bee stings) May repeat one time in 5-15 minutes if response to initial dose is inadequate.  Qty: 2 each, Refills: 1    Associated Diagnoses: Bee sting reaction, accidental or unintentional, initial encounter      fluticasone (FLONASE) 50 MCG/ACT nasal spray Spray 1 spray into both nostrils 2 times daily Use at night before bed  Qty: 15.8 mL, Refills: 3    Associated Diagnoses: Seasonal allergic rhinitis, unspecified trigger      Fluticasone-Umeclidin-Vilanterol (TRELEGY ELLIPTA) 200-62.5-25 MCG/ACT oral inhaler USE 1 INHALATION DAILY  Qty: 28 each, Refills: 5    Associated Diagnoses: Chronic obstructive pulmonary disease, unspecified COPD type (H)      GLUCOSAMINE-CHONDROITIN -400 MG tablet TAKE 1 TABLET DAILY  Qty: 90 tablet, Refills: 3    Associated Diagnoses: Type 2 diabetes mellitus without complication, without long-term current use of insulin (H)      losartan (COZAAR) 50 MG tablet Take 1 tablet (50 mg) by mouth daily  Qty: 90 tablet, Refills: 3    Associated Diagnoses: Hypertension, unspecified type      omega-3 acid ethyl esters (LOVAZA) 1 g capsule Take 2 capsules (2 g) by mouth 2 times daily  Qty: 360 capsule, Refills: 3    Associated Diagnoses: Type 2 diabetes mellitus without complication, without long-term current use of insulin (H)      omeprazole (PRILOSEC) 20 MG   capsule Take 1 capsule (20 mg) by mouth daily  Qty: 90 capsule, Refills: 3    Comments: Patient takes 40 mg  Associated Diagnoses: Gastroesophageal reflux disease with esophagitis without hemorrhage      polyethylene glycol-propylene glycol (SYSTANE) 0.4-0.3 % SOLN ophthalmic solution Place 1 drop into both eyes 4 times daily  Qty: 5 mL, Refills: 11    Associated Diagnoses: Dry eyes      !! pregabalin (LYRICA) 150 MG capsule Take 1 capsule (150 mg) by mouth 2 times daily TAKE 1 CAPSULE(150 MG) BY MOUTH TWICE DAILY  Qty: 180 capsule, Refills: 3    Associated Diagnoses: Type 2 diabetes mellitus with hyperglycemia, without long-term current use of insulin (H); Diabetic peripheral neuropathy (H)      roflumilast (DALIRESP) 500 MCG TABS tablet Take 1 tablet (500 mcg) by mouth every morning  Qty: 90 tablet, Refills: 3    Associated Diagnoses: Chronic obstructive pulmonary disease, unspecified COPD type (H)      tirzepatide (MOUNJARO) 5 MG/0.5ML pen Inject 5 mg Subcutaneous every 7 days  Qty: 6 mL, Refills: 3    Associated Diagnoses: Type 2 diabetes mellitus without complication, without long-term current use of insulin (H)       !! - Potential duplicate medications found. Please discuss with provider.        STOP taking these medications       ipratropium - albuterol 0.5 mg/2.5 mg/3 mL (DUONEB) 0.5-2.5 (3) MG/3ML neb solution Comments:   Reason for Stopping:             Allergies   Allergies   Allergen Reactions    Aspirin      325mg     Bee Venom Anaphylaxis    Penicillins Hives    Acetaminophen GI Disturbance    Azithromycin Dizziness    Colon Care     Interferons Dermatitis    Metformin GI Disturbance

## 2024-08-16 NOTE — PROGRESS NOTES
Care Management Discharge Note    Discharge Date: 08/16/2024       Discharge Disposition: Transitional Care    [facility]  [address] [place] [address]  Phone: [phone]  [facility] Liaison - Melissa Mireles  P: [phone]  F: [phone]  Email: [email or web address]    Discharge Services:  (Post Acute Therapies)    Discharge DME:  (SNF to provide all necessary DME)    Discharge Transportation:  ([facility] (P: [phone]) stretcher ride scheduled for 8/17 with window of [phone]. Patient agreeable to possible out of pocket costs.)    Private pay costs discussed: transportation costs and insurance costs co-pays    Does the patient's insurance plan have a 3 day qualifying hospital stay waiver?  Yes     Which insurance plan 3 day waiver is available? Alternative insurance waiver    Will the waiver be used for post-acute placement? Yes    PAS Confirmation Code:  [id number]   Patient/family educated on Medicare website which has current facility and service quality ratings: yes    Education Provided on the Discharge Plan: Yes  Persons Notified of Discharge Plans: bedside nurse, charge nurse, patient, provider, facility  Patient/Family in Agreement with the Plan: yes    Handoff Referral Completed: Yes    Additional Information:    Patient was accepted to [facility] today. Per hospitalist, patient is ready for discharge today. Patient also accepted to [facility], but they do not have bed until tomorrow.    TERESSA met with patient at bedside alongside Opal, training . Informed patient of accepting facility and plan for discharge this afternoon/evening. Discussed transportation options and patient is opting for [facility] stretcher ride, medically indicated due to pain and inability to sit in wheelchair for time of transport.   Patient will call to inform her daughter and/or brother.    2:27 PM  Orders sent to facility    TERESSA called [facility] (P:  [phone]) to schedule stretcher ride. The soonest window they could provide transportation was 9:05p-9:50pm. Booked for now.    Spoke with [facility] liaison, they requested that patient transfer tomorrow morning instead around 9am.     TERESSA called [facility] and rescheduled ride. New window is for 8/17 between [phone]. Informed [facility] liaison of ride time.    Informed provider, bedside nurse, charge nurse, and patient that patient will discharge tomorrow morning.    shan Joseph completed PAS: [id number]     TERESSA completed PCS, faxed to [facility] billing, placed in hard chart.    2:44 PM  Hard scripts faxed to liaMARGARITA Beltran, LSW  8 Med Surg   [facility]  Phone: [phone]  Vocera: Searchable by name or group: 8 Med Surg Vocera

## 2024-08-16 NOTE — PLAN OF CARE
Goal Outcome Evaluation:      Plan of Care Reviewed With: patient    Overall Patient Progress: no changeOverall Patient Progress: no change       Pt is alert and oriented x 4, assist of 2 with lift or bedrest. Pt is continent of bowel and bladder, last BM was on the 8/12, on a regular diet thin liquid, takes pills whole with water, and is on a BG checks, 0200 am checks was 144. Pt is on a continuous lactated ringers, and has been feeling nausea and vomiting having pain at her back the entire night.  Zofran and oxycodone was used to manage those discomforts, and she verbalized feeling better after receiving a second Zofran.  Pt has a PIV on her right arm which is infusing right now, and on a 2 liters of nasal cannula. Pt has MRI yesterday. Pt was able to make needs known, call light was within reach, no acute situation noted this shift. Continue pt's plan of care.

## 2024-08-17 ENCOUNTER — APPOINTMENT (OUTPATIENT)
Dept: GENERAL RADIOLOGY | Facility: CLINIC | Age: 69
DRG: 551 | End: 2024-08-17
Attending: NURSE PRACTITIONER
Payer: COMMERCIAL

## 2024-08-17 ENCOUNTER — APPOINTMENT (OUTPATIENT)
Dept: CARDIOLOGY | Facility: CLINIC | Age: 69
DRG: 551 | End: 2024-08-17
Attending: NURSE PRACTITIONER
Payer: COMMERCIAL

## 2024-08-17 LAB
ALBUMIN SERPL BCG-MCNC: 3.4 G/DL (ref 3.5–5.2)
ALBUMIN UR-MCNC: 30 MG/DL
ALP SERPL-CCNC: 89 U/L (ref 40–150)
ALT SERPL W P-5'-P-CCNC: 6 U/L (ref 0–50)
ANION GAP SERPL CALCULATED.3IONS-SCNC: 10 MMOL/L (ref 7–15)
ANION GAP SERPL CALCULATED.3IONS-SCNC: 12 MMOL/L (ref 7–15)
ANION GAP SERPL CALCULATED.3IONS-SCNC: 12 MMOL/L (ref 7–15)
APPEARANCE UR: CLEAR
AST SERPL W P-5'-P-CCNC: 21 U/L (ref 0–45)
ATRIAL RATE - MUSE: 141 BPM
BASE EXCESS BLDV CALC-SCNC: -2.3 MMOL/L (ref -3–3)
BILIRUB DIRECT SERPL-MCNC: 0.22 MG/DL (ref 0–0.3)
BILIRUB SERPL-MCNC: 0.6 MG/DL
BILIRUB UR QL STRIP: ABNORMAL
BUN SERPL-MCNC: 13.9 MG/DL (ref 8–23)
BUN SERPL-MCNC: 15 MG/DL (ref 8–23)
BUN SERPL-MCNC: 15.7 MG/DL (ref 8–23)
C PNEUM DNA SPEC QL NAA+PROBE: NOT DETECTED
CALCIUM SERPL-MCNC: 8.5 MG/DL (ref 8.8–10.4)
CALCIUM SERPL-MCNC: 8.7 MG/DL (ref 8.8–10.4)
CALCIUM SERPL-MCNC: 8.9 MG/DL (ref 8.8–10.4)
CHLORIDE SERPL-SCNC: 102 MMOL/L (ref 98–107)
CHLORIDE SERPL-SCNC: 97 MMOL/L (ref 98–107)
CHLORIDE SERPL-SCNC: 97 MMOL/L (ref 98–107)
COLOR UR AUTO: YELLOW
CREAT SERPL-MCNC: 0.79 MG/DL (ref 0.51–0.95)
CREAT SERPL-MCNC: 1.18 MG/DL (ref 0.51–0.95)
CREAT SERPL-MCNC: 1.35 MG/DL (ref 0.51–0.95)
CREAT UR-MCNC: 155.1 MG/DL
CRP SERPL-MCNC: 139.56 MG/L
DIASTOLIC BLOOD PRESSURE - MUSE: NORMAL MMHG
EGFRCR SERPLBLD CKD-EPI 2021: 42 ML/MIN/1.73M2
EGFRCR SERPLBLD CKD-EPI 2021: 50 ML/MIN/1.73M2
EGFRCR SERPLBLD CKD-EPI 2021: 81 ML/MIN/1.73M2
ERYTHROCYTE [DISTWIDTH] IN BLOOD BY AUTOMATED COUNT: 12.6 % (ref 10–15)
FLUAV H1 2009 PAND RNA SPEC QL NAA+PROBE: NOT DETECTED
FLUAV H1 RNA SPEC QL NAA+PROBE: NOT DETECTED
FLUAV H3 RNA SPEC QL NAA+PROBE: NOT DETECTED
FLUAV RNA SPEC QL NAA+PROBE: NEGATIVE
FLUAV RNA SPEC QL NAA+PROBE: NOT DETECTED
FLUBV RNA RESP QL NAA+PROBE: NEGATIVE
FLUBV RNA SPEC QL NAA+PROBE: NOT DETECTED
GLUCOSE BLDC GLUCOMTR-MCNC: 126 MG/DL (ref 70–99)
GLUCOSE BLDC GLUCOMTR-MCNC: 131 MG/DL (ref 70–99)
GLUCOSE BLDC GLUCOMTR-MCNC: 154 MG/DL (ref 70–99)
GLUCOSE BLDC GLUCOMTR-MCNC: 166 MG/DL (ref 70–99)
GLUCOSE BLDC GLUCOMTR-MCNC: 175 MG/DL (ref 70–99)
GLUCOSE BLDC GLUCOMTR-MCNC: 184 MG/DL (ref 70–99)
GLUCOSE SERPL-MCNC: 144 MG/DL (ref 70–99)
GLUCOSE SERPL-MCNC: 171 MG/DL (ref 70–99)
GLUCOSE SERPL-MCNC: 190 MG/DL (ref 70–99)
GLUCOSE UR STRIP-MCNC: 1000 MG/DL
HADV DNA SPEC QL NAA+PROBE: NOT DETECTED
HCO3 BLDV-SCNC: 25 MMOL/L (ref 21–28)
HCO3 SERPL-SCNC: 22 MMOL/L (ref 22–29)
HCO3 SERPL-SCNC: 23 MMOL/L (ref 22–29)
HCO3 SERPL-SCNC: 25 MMOL/L (ref 22–29)
HCOV PNL SPEC NAA+PROBE: NOT DETECTED
HCT VFR BLD AUTO: 38.9 % (ref 35–47)
HGB BLD-MCNC: 12.5 G/DL (ref 11.7–15.7)
HGB UR QL STRIP: ABNORMAL
HMPV RNA SPEC QL NAA+PROBE: NOT DETECTED
HOLD SPECIMEN: NORMAL
HOLD SPECIMEN: NORMAL
HPIV1 RNA SPEC QL NAA+PROBE: NOT DETECTED
HPIV2 RNA SPEC QL NAA+PROBE: NOT DETECTED
HPIV3 RNA SPEC QL NAA+PROBE: NOT DETECTED
HPIV4 RNA SPEC QL NAA+PROBE: NOT DETECTED
HYALINE CASTS: 1 /LPF
INTERPRETATION ECG - MUSE: NORMAL
KETONES UR STRIP-MCNC: 60 MG/DL
LACTATE SERPL-SCNC: 1.5 MMOL/L (ref 0.7–2)
LEUKOCYTE ESTERASE UR QL STRIP: NEGATIVE
LVEF ECHO: NORMAL
M PNEUMO DNA SPEC QL NAA+PROBE: NOT DETECTED
MAGNESIUM SERPL-MCNC: 1.7 MG/DL (ref 1.7–2.3)
MAGNESIUM SERPL-MCNC: 2.4 MG/DL (ref 1.7–2.3)
MCH RBC QN AUTO: 28.3 PG (ref 26.5–33)
MCHC RBC AUTO-ENTMCNC: 32.1 G/DL (ref 31.5–36.5)
MCV RBC AUTO: 88 FL (ref 78–100)
MUCOUS THREADS #/AREA URNS LPF: PRESENT /LPF
NITRATE UR QL: NEGATIVE
NT-PROBNP SERPL-MCNC: 471 PG/ML (ref 0–900)
O2/TOTAL GAS SETTING VFR VENT: 28 %
OXYHGB MFR BLDV: 86 % (ref 70–75)
P AXIS - MUSE: NORMAL DEGREES
PCO2 BLDV: 55 MM HG (ref 40–50)
PH BLDV: 7.27 [PH] (ref 7.32–7.43)
PH UR STRIP: 6.5 [PH] (ref 5–7)
PHOSPHATE SERPL-MCNC: 2.1 MG/DL (ref 2.5–4.5)
PHOSPHATE SERPL-MCNC: 3.4 MG/DL (ref 2.5–4.5)
PLATELET # BLD AUTO: 238 10E3/UL (ref 150–450)
PO2 BLDV: 58 MM HG (ref 25–47)
POTASSIUM SERPL-SCNC: 3.5 MMOL/L (ref 3.4–5.3)
POTASSIUM SERPL-SCNC: 3.6 MMOL/L (ref 3.4–5.3)
POTASSIUM SERPL-SCNC: 3.9 MMOL/L (ref 3.4–5.3)
PR INTERVAL - MUSE: NORMAL MS
PROCALCITONIN SERPL IA-MCNC: 0.12 NG/ML
PROT SERPL-MCNC: 6.8 G/DL (ref 6.4–8.3)
QRS DURATION - MUSE: 76 MS
QT - MUSE: 240 MS
QTC - MUSE: 422 MS
R AXIS - MUSE: 60 DEGREES
RBC # BLD AUTO: 4.42 10E6/UL (ref 3.8–5.2)
RBC URINE: 0 /HPF
RSV RNA SPEC NAA+PROBE: NEGATIVE
RSV RNA SPEC QL NAA+PROBE: NOT DETECTED
RSV RNA SPEC QL NAA+PROBE: NOT DETECTED
RV+EV RNA SPEC QL NAA+PROBE: NOT DETECTED
SAO2 % BLDV: 87.1 % (ref 70–75)
SARS-COV-2 RNA RESP QL NAA+PROBE: NEGATIVE
SODIUM SERPL-SCNC: 132 MMOL/L (ref 135–145)
SODIUM SERPL-SCNC: 132 MMOL/L (ref 135–145)
SODIUM SERPL-SCNC: 136 MMOL/L (ref 135–145)
SODIUM UR-SCNC: <20 MMOL/L
SP GR UR STRIP: 1.02 (ref 1–1.03)
SQUAMOUS EPITHELIAL: 3 /HPF
SYSTOLIC BLOOD PRESSURE - MUSE: NORMAL MMHG
T AXIS - MUSE: -80 DEGREES
TROPONIN T SERPL HS-MCNC: 15 NG/L
TSH SERPL DL<=0.005 MIU/L-ACNC: 3.46 UIU/ML (ref 0.3–4.2)
UROBILINOGEN UR STRIP-MCNC: 4 MG/DL
VENTRICULAR RATE- MUSE: 186 BPM
WBC # BLD AUTO: 10.3 10E3/UL (ref 4–11)
WBC URINE: 5 /HPF

## 2024-08-17 PROCEDURE — 250N000013 HC RX MED GY IP 250 OP 250 PS 637: Performed by: HOSPITALIST

## 2024-08-17 PROCEDURE — 86140 C-REACTIVE PROTEIN: CPT | Performed by: NURSE PRACTITIONER

## 2024-08-17 PROCEDURE — 250N000009 HC RX 250: Performed by: NURSE PRACTITIONER

## 2024-08-17 PROCEDURE — 93306 TTE W/DOPPLER COMPLETE: CPT

## 2024-08-17 PROCEDURE — 94640 AIRWAY INHALATION TREATMENT: CPT | Mod: 76

## 2024-08-17 PROCEDURE — 250N000011 HC RX IP 250 OP 636

## 2024-08-17 PROCEDURE — 84300 ASSAY OF URINE SODIUM: CPT | Performed by: NURSE PRACTITIONER

## 2024-08-17 PROCEDURE — 93306 TTE W/DOPPLER COMPLETE: CPT | Mod: 26 | Performed by: INTERNAL MEDICINE

## 2024-08-17 PROCEDURE — 250N000009 HC RX 250

## 2024-08-17 PROCEDURE — 3E043XZ INTRODUCTION OF VASOPRESSOR INTO CENTRAL VEIN, PERCUTANEOUS APPROACH: ICD-10-PCS | Performed by: NURSE PRACTITIONER

## 2024-08-17 PROCEDURE — 71045 X-RAY EXAM CHEST 1 VIEW: CPT

## 2024-08-17 PROCEDURE — 84100 ASSAY OF PHOSPHORUS: CPT | Performed by: NURSE PRACTITIONER

## 2024-08-17 PROCEDURE — 81001 URINALYSIS AUTO W/SCOPE: CPT | Performed by: NURSE PRACTITIONER

## 2024-08-17 PROCEDURE — 99292 CRITICAL CARE ADDL 30 MIN: CPT | Mod: FS | Performed by: STUDENT IN AN ORGANIZED HEALTH CARE EDUCATION/TRAINING PROGRAM

## 2024-08-17 PROCEDURE — P9045 ALBUMIN (HUMAN), 5%, 250 ML: HCPCS

## 2024-08-17 PROCEDURE — 94640 AIRWAY INHALATION TREATMENT: CPT

## 2024-08-17 PROCEDURE — 80048 BASIC METABOLIC PNL TOTAL CA: CPT

## 2024-08-17 PROCEDURE — 99291 CRITICAL CARE FIRST HOUR: CPT | Mod: FS | Performed by: STUDENT IN AN ORGANIZED HEALTH CARE EDUCATION/TRAINING PROGRAM

## 2024-08-17 PROCEDURE — 258N000003 HC RX IP 258 OP 636: Performed by: NURSE PRACTITIONER

## 2024-08-17 PROCEDURE — 36415 COLL VENOUS BLD VENIPUNCTURE: CPT | Performed by: NURSE PRACTITIONER

## 2024-08-17 PROCEDURE — 87633 RESP VIRUS 12-25 TARGETS: CPT | Performed by: NURSE PRACTITIONER

## 2024-08-17 PROCEDURE — 83605 ASSAY OF LACTIC ACID: CPT | Performed by: NURSE PRACTITIONER

## 2024-08-17 PROCEDURE — 87637 SARSCOV2&INF A&B&RSV AMP PRB: CPT | Performed by: NURSE PRACTITIONER

## 2024-08-17 PROCEDURE — 200N000002 HC R&B ICU UMMC

## 2024-08-17 PROCEDURE — 84484 ASSAY OF TROPONIN QUANT: CPT | Performed by: NURSE PRACTITIONER

## 2024-08-17 PROCEDURE — 250N000013 HC RX MED GY IP 250 OP 250 PS 637: Performed by: PHYSICIAN ASSISTANT

## 2024-08-17 PROCEDURE — 83735 ASSAY OF MAGNESIUM: CPT | Performed by: NURSE PRACTITIONER

## 2024-08-17 PROCEDURE — 250N000013 HC RX MED GY IP 250 OP 250 PS 637: Performed by: NURSE PRACTITIONER

## 2024-08-17 PROCEDURE — 250N000011 HC RX IP 250 OP 636: Performed by: HOSPITALIST

## 2024-08-17 PROCEDURE — 250N000011 HC RX IP 250 OP 636: Performed by: NURSE PRACTITIONER

## 2024-08-17 PROCEDURE — 71045 X-RAY EXAM CHEST 1 VIEW: CPT | Mod: 26 | Performed by: RADIOLOGY

## 2024-08-17 PROCEDURE — 80048 BASIC METABOLIC PNL TOTAL CA: CPT | Performed by: NURSE PRACTITIONER

## 2024-08-17 PROCEDURE — 82805 BLOOD GASES W/O2 SATURATION: CPT | Performed by: NURSE PRACTITIONER

## 2024-08-17 PROCEDURE — 999N000157 HC STATISTIC RCP TIME EA 10 MIN

## 2024-08-17 PROCEDURE — 36415 COLL VENOUS BLD VENIPUNCTURE: CPT

## 2024-08-17 PROCEDURE — 93005 ELECTROCARDIOGRAM TRACING: CPT

## 2024-08-17 PROCEDURE — 85027 COMPLETE CBC AUTOMATED: CPT | Performed by: NURSE PRACTITIONER

## 2024-08-17 PROCEDURE — 82570 ASSAY OF URINE CREATININE: CPT | Performed by: NURSE PRACTITIONER

## 2024-08-17 PROCEDURE — 84443 ASSAY THYROID STIM HORMONE: CPT | Performed by: NURSE PRACTITIONER

## 2024-08-17 PROCEDURE — 272N000202 HC AEROBIKA WITH MANOMETER

## 2024-08-17 PROCEDURE — 83880 ASSAY OF NATRIURETIC PEPTIDE: CPT | Performed by: NURSE PRACTITIONER

## 2024-08-17 RX ORDER — BISACODYL 10 MG
10 SUPPOSITORY, RECTAL RECTAL DAILY PRN
Status: DISCONTINUED | OUTPATIENT
Start: 2024-08-17 | End: 2024-08-26 | Stop reason: HOSPADM

## 2024-08-17 RX ORDER — LEVALBUTEROL INHALATION SOLUTION 1.25 MG/3ML
1.25 SOLUTION RESPIRATORY (INHALATION)
Status: DISCONTINUED | OUTPATIENT
Start: 2024-08-17 | End: 2024-08-26 | Stop reason: HOSPADM

## 2024-08-17 RX ORDER — PREGABALIN 100 MG/1
100 CAPSULE ORAL 2 TIMES DAILY
Status: DISCONTINUED | OUTPATIENT
Start: 2024-08-17 | End: 2024-08-18

## 2024-08-17 RX ORDER — ACETAMINOPHEN 325 MG/1
975 TABLET ORAL EVERY 8 HOURS
Status: DISCONTINUED | OUTPATIENT
Start: 2024-08-17 | End: 2024-08-19

## 2024-08-17 RX ORDER — POLYETHYLENE GLYCOL 3350 17 G/17G
17 POWDER, FOR SOLUTION ORAL DAILY
Status: DISCONTINUED | OUTPATIENT
Start: 2024-08-17 | End: 2024-08-20

## 2024-08-17 RX ORDER — CALCIUM GLUCONATE 20 MG/ML
2 INJECTION, SOLUTION INTRAVENOUS ONCE
Status: COMPLETED | OUTPATIENT
Start: 2024-08-17 | End: 2024-08-17

## 2024-08-17 RX ORDER — SODIUM CHLORIDE 9 MG/ML
INJECTION, SOLUTION INTRAVENOUS CONTINUOUS
Status: DISCONTINUED | OUTPATIENT
Start: 2024-08-17 | End: 2024-08-18

## 2024-08-17 RX ORDER — ACETAMINOPHEN 650 MG/1
650 SUPPOSITORY RECTAL EVERY 4 HOURS
Status: DISCONTINUED | OUTPATIENT
Start: 2024-08-17 | End: 2024-08-17

## 2024-08-17 RX ORDER — ACETAMINOPHEN 325 MG/1
650 TABLET ORAL EVERY 4 HOURS
Status: DISCONTINUED | OUTPATIENT
Start: 2024-08-17 | End: 2024-08-17

## 2024-08-17 RX ORDER — ROPIVACAINE IN 0.9% SOD CHL/PF 0.1 %
.01-.125 PLASTIC BAG, INJECTION (ML) EPIDURAL CONTINUOUS
Status: DISCONTINUED | OUTPATIENT
Start: 2024-08-17 | End: 2024-08-18

## 2024-08-17 RX ORDER — POTASSIUM CHLORIDE 750 MG/1
20 TABLET, EXTENDED RELEASE ORAL ONCE
Status: COMPLETED | OUTPATIENT
Start: 2024-08-17 | End: 2024-08-17

## 2024-08-17 RX ORDER — HEPARIN SODIUM 5000 [USP'U]/.5ML
5000 INJECTION, SOLUTION INTRAVENOUS; SUBCUTANEOUS EVERY 8 HOURS
Status: DISCONTINUED | OUTPATIENT
Start: 2024-08-17 | End: 2024-08-18

## 2024-08-17 RX ADMIN — ACETAMINOPHEN 975 MG: 325 TABLET, FILM COATED ORAL at 13:12

## 2024-08-17 RX ADMIN — CARBOXYMETHYLCELLULOSE SODIUM 1 DROP: 10 GEL OPHTHALMIC at 17:16

## 2024-08-17 RX ADMIN — SODIUM PHOSPHATE, MONOBASIC, MONOHYDRATE AND SODIUM PHOSPHATE, DIBASIC, ANHYDROUS 9 MMOL: 142; 276 INJECTION, SOLUTION INTRAVENOUS at 02:57

## 2024-08-17 RX ADMIN — SODIUM CHLORIDE 500 ML: 9 INJECTION, SOLUTION INTRAVENOUS at 02:46

## 2024-08-17 RX ADMIN — SODIUM CHLORIDE: 9 INJECTION, SOLUTION INTRAVENOUS at 20:02

## 2024-08-17 RX ADMIN — SENNOSIDES AND DOCUSATE SODIUM 1 TABLET: 50; 8.6 TABLET ORAL at 20:27

## 2024-08-17 RX ADMIN — POLYETHYLENE GLYCOL 400 AND PROPYLENE GLYCOL 1 DROP: 4; 3 SOLUTION/ DROPS OPHTHALMIC at 11:45

## 2024-08-17 RX ADMIN — OXYCODONE HYDROCHLORIDE 5 MG: 5 TABLET ORAL at 10:03

## 2024-08-17 RX ADMIN — SODIUM CHLORIDE: 9 INJECTION, SOLUTION INTRAVENOUS at 04:51

## 2024-08-17 RX ADMIN — HEPARIN SODIUM 5000 UNITS: 5000 INJECTION, SOLUTION INTRAVENOUS; SUBCUTANEOUS at 23:54

## 2024-08-17 RX ADMIN — HEPARIN SODIUM 5000 UNITS: 5000 INJECTION, SOLUTION INTRAVENOUS; SUBCUTANEOUS at 15:42

## 2024-08-17 RX ADMIN — OXYCODONE HYDROCHLORIDE 10 MG: 10 TABLET ORAL at 04:40

## 2024-08-17 RX ADMIN — POLYETHYLENE GLYCOL 400 AND PROPYLENE GLYCOL 1 DROP: 4; 3 SOLUTION/ DROPS OPHTHALMIC at 20:25

## 2024-08-17 RX ADMIN — ACETAMINOPHEN 650 MG: 325 TABLET, FILM COATED ORAL at 04:39

## 2024-08-17 RX ADMIN — NOREPINEPHRINE BITARTRATE 0.03 MCG/KG/MIN: 0.02 INJECTION, SOLUTION INTRAVENOUS at 01:49

## 2024-08-17 RX ADMIN — POLYETHYLENE GLYCOL 3350 17 G: 17 POWDER, FOR SOLUTION ORAL at 09:40

## 2024-08-17 RX ADMIN — MAGNESIUM SULFATE HEPTAHYDRATE 2 G: 40 INJECTION, SOLUTION INTRAVENOUS at 02:43

## 2024-08-17 RX ADMIN — OMEPRAZOLE 20 MG: 20 CAPSULE, DELAYED RELEASE ORAL at 09:37

## 2024-08-17 RX ADMIN — CARBOXYMETHYLCELLULOSE SODIUM 1 DROP: 10 GEL OPHTHALMIC at 11:56

## 2024-08-17 RX ADMIN — ALBUMIN HUMAN 25 G: 0.05 INJECTION, SOLUTION INTRAVENOUS at 11:37

## 2024-08-17 RX ADMIN — SODIUM CHLORIDE: 9 INJECTION, SOLUTION INTRAVENOUS at 10:22

## 2024-08-17 RX ADMIN — ASPIRIN 81 MG: 81 TABLET, COATED ORAL at 09:38

## 2024-08-17 RX ADMIN — LIDOCAINE 2 PATCH: 4 PATCH TOPICAL at 20:28

## 2024-08-17 RX ADMIN — SODIUM CHLORIDE, POTASSIUM CHLORIDE, SODIUM LACTATE AND CALCIUM CHLORIDE 500 ML: 600; 310; 30; 20 INJECTION, SOLUTION INTRAVENOUS at 00:03

## 2024-08-17 RX ADMIN — ACETAMINOPHEN 975 MG: 325 TABLET, FILM COATED ORAL at 20:26

## 2024-08-17 RX ADMIN — CALCIUM GLUCONATE 2 G: 20 INJECTION, SOLUTION INTRAVENOUS at 04:29

## 2024-08-17 RX ADMIN — FLUTICASONE FUROATE AND VILANTEROL TRIFENATATE 1 PUFF: 200; 25 POWDER RESPIRATORY (INHALATION) at 11:44

## 2024-08-17 RX ADMIN — CARBOXYMETHYLCELLULOSE SODIUM 1 DROP: 10 GEL OPHTHALMIC at 20:28

## 2024-08-17 RX ADMIN — FLUTICASONE PROPIONATE 1 SPRAY: 50 SPRAY, METERED NASAL at 20:26

## 2024-08-17 RX ADMIN — AMIODARONE HYDROCHLORIDE 150 MG: 1.5 INJECTION, SOLUTION INTRAVENOUS at 01:13

## 2024-08-17 RX ADMIN — FLUTICASONE PROPIONATE 1 SPRAY: 50 SPRAY, METERED NASAL at 11:44

## 2024-08-17 RX ADMIN — SODIUM CHLORIDE 1 MG/MIN: 9 INJECTION, SOLUTION INTRAVENOUS at 01:43

## 2024-08-17 RX ADMIN — HYDROMORPHONE HYDROCHLORIDE 0.3 MG: 1 INJECTION, SOLUTION INTRAMUSCULAR; INTRAVENOUS; SUBCUTANEOUS at 20:32

## 2024-08-17 RX ADMIN — ATORVASTATIN CALCIUM 10 MG: 10 TABLET, FILM COATED ORAL at 09:38

## 2024-08-17 RX ADMIN — LEVALBUTEROL HYDROCHLORIDE 1.25 MG: 1.25 SOLUTION RESPIRATORY (INHALATION) at 19:39

## 2024-08-17 RX ADMIN — POLYETHYLENE GLYCOL 400 AND PROPYLENE GLYCOL 1 DROP: 4; 3 SOLUTION/ DROPS OPHTHALMIC at 17:16

## 2024-08-17 RX ADMIN — LEVALBUTEROL HYDROCHLORIDE 1.25 MG: 1.25 SOLUTION RESPIRATORY (INHALATION) at 12:12

## 2024-08-17 RX ADMIN — METOPROLOL TARTRATE 5 MG: 5 INJECTION, SOLUTION INTRAVENOUS at 16:08

## 2024-08-17 RX ADMIN — PREGABALIN 100 MG: 100 CAPSULE ORAL at 20:26

## 2024-08-17 RX ADMIN — PREGABALIN 100 MG: 100 CAPSULE ORAL at 09:38

## 2024-08-17 RX ADMIN — UMECLIDINIUM 1 PUFF: 62.5 AEROSOL, POWDER ORAL at 11:44

## 2024-08-17 RX ADMIN — POTASSIUM CHLORIDE 20 MEQ: 750 TABLET, EXTENDED RELEASE ORAL at 03:04

## 2024-08-17 RX ADMIN — HEPARIN SODIUM 5000 UNITS: 5000 INJECTION, SOLUTION INTRAVENOUS; SUBCUTANEOUS at 09:37

## 2024-08-17 RX ADMIN — DULOXETINE HYDROCHLORIDE 30 MG: 30 CAPSULE, DELAYED RELEASE ORAL at 09:37

## 2024-08-17 RX ADMIN — DULOXETINE HYDROCHLORIDE 30 MG: 30 CAPSULE, DELAYED RELEASE ORAL at 20:27

## 2024-08-17 RX ADMIN — HYDROMORPHONE HYDROCHLORIDE 0.3 MG: 1 INJECTION, SOLUTION INTRAMUSCULAR; INTRAVENOUS; SUBCUTANEOUS at 15:26

## 2024-08-17 RX ADMIN — OXYCODONE HYDROCHLORIDE 5 MG: 5 TABLET ORAL at 00:24

## 2024-08-17 RX ADMIN — SENNOSIDES AND DOCUSATE SODIUM 1 TABLET: 50; 8.6 TABLET ORAL at 09:41

## 2024-08-17 RX ADMIN — CARBOXYMETHYLCELLULOSE SODIUM 1 DROP: 10 GEL OPHTHALMIC at 09:42

## 2024-08-17 RX ADMIN — CETIRIZINE HYDROCHLORIDE 10 MG: 10 TABLET, FILM COATED ORAL at 09:39

## 2024-08-17 RX ADMIN — CYANOCOBALAMIN TAB 500 MCG 500 MCG: 500 TAB at 09:37

## 2024-08-17 ASSESSMENT — ACTIVITIES OF DAILY LIVING (ADL)
ADLS_ACUITY_SCORE: 35
ADLS_ACUITY_SCORE: 31
ADLS_ACUITY_SCORE: 31
ADLS_ACUITY_SCORE: 27
ADLS_ACUITY_SCORE: 31
ADLS_ACUITY_SCORE: 27
ADLS_ACUITY_SCORE: 35
ADLS_ACUITY_SCORE: 27
ADLS_ACUITY_SCORE: 35

## 2024-08-17 NOTE — H&P
[facility] ADULT ICU H&P  08/17/2024    Date of Hospital Admission: 8/14/24  Date of ICU Admission: 8/17/24  Reason for Critical Care Admission: A fib with RVR requiring amio gtt and low-dose vasopressors   Date of Service (when I saw the patient): 08/17/2024    ASSESSMENT: Jenn Aparicio is a 69 year old female with PMH notable for O2 dependent COPD (2L O2 @ rest and 3L with exertion), Lung adenocarcinoma s/p RLL lobectomy (2/2016), SBRT to RUL nodule (1/2020) DMII c/b peripheral neuropathy, HTN, and HLD. Initially admitted 8/14/24 to medicine with L ankle pain & swelling accompanied by failure to thrive. Initially planned for discharge to TCU AM of 8/17, but instead transferred to ICU in the early hours of 8/17/24 for treatment of new onset A fib with RVR accompanied by DARIO.         PLAN:    Neuro:  # Chronic low back pain  # Acute L ankle pain   # Peripheral neuropathy   History of chronic low back pain and imaging from 6/26/24 showed possible metastatic lesion. Patient reiterates that her back pain has worsened, with radiation down her L LE with weakness and that contributed to her ankle twisting. Underwent MRI lumbar spine to rule out worsening metastatic spine disease, myelopathy and showed stable symptoms/. D/W Neurosurgery service and recommended outpatient follow up .  - Follow-up with neurosurg outpt   - Continue duloxetine 30 mg BID  - Pregabalin decreased from 200 mg BID to 100 mg BID in light of DARIO  - Lidocaine patch  - Tylenol 650 mg q4h PRN  - Flexeril 5 mg 3 times daily as needed  - Hydromorphone 0.3 mg IV every 4 hours as needed  - Oxycodone 5 to 10 mg every 4 hours as needed    Pulmonary:  # Chronic hypoxic respiratory failure, 2L home O2  # Severe O2 dependent COPD, not currently exacerbated   Slight wheezing noted during RRT, however on ICU arrival no wheezing noted  -Chest x-ray on ICU arrival  -Continue PTA Flonase, cetirizine  -Continue Breo and Incruse daily inhalers  -As needed albuterol plus  Mucomyst and DuoNebs ordered, has not been using and would exchange albuterol for Xopenex if using frequently until heart rate controlled  -Will hold off on resuming PTA Roflumilast, as this has been reported to precipitate A-fib    Cardiovascular:  # Atrial fibrillation with RVR, new onset  # Hypotension 2/2 above, exacerbated by hypovolemia  # Mild troponemia 2/2 above   # HTN, h/o  # HLD   RRT called evening of 8/16/2024 for A-fib with RVR.  HR  ~ 160-200.  BP initially acceptable, although much lower than her baseline which was ~ 140-180 day prior.  She has never been in A-fib before.  On exam she was warm and well-perfused, however diaphoretic.  Patient reported dizziness, palpitations, and chest discomfort when heart rates severely elevated.  Symptoms have thus resolved after improving heart rates.  Labs notable for new DARIO CR 1.18 and mild troponinemia 18.  Continues to have no signs of systemic infection and lactic acid is normal.  Initially treated with metoprolol 5 mg IV given with modest heart rate improvement.  BP became hypotensive she was bolused with 1L fluid in divided doses and started on amiodarone.   - EKG showed Afib with RVR  - TTE in AM   - Troponin 18 --> 15, LA 1.5, no leukocytosis  - Optimize electrolytes, K+ > 4.0, Mg2+ > 2.0 --> provider to replete  - Goal MAP > 65 mmHg   - Fluids: 0.5L bolus x2, start MIV @ 100 ml/hr    - metoprolol 5 mg IV x1   - Amiodarone bolus + gtt   - Hold losartan 50 mg every day     TTE 9/22/22  Interpretation Summary  Left ventricular size, wall motion and function are normal. The ejection  fraction is 55-60%.     Right ventricular function, chamber size, wall motion, and thickness are  normal.     No pericardial effusion is present.     The inferior vena cava is normal.     No significant valvular abnormalities present.     There is no prior study for direct comparison.    GI/Nutrition:  # Risk for malnutrition  # Obesity  - RD consult for malnutrition  assessment    - Moderate CHO 60 g every day, continue  - Scheduled and PRN bowel regimen   - Continue PTA PPI     Renal/Fluids/Electrolytes:  # DARIO, likely prerenal in the setting of decrease po intake and hypotension  # Hyponatremia  # Hypophosphatemia   Baseline Cr ~ 0.5-0.6. Renal function was normal on hospital arrival. BMP drawn during RRT showing new DARIO Cr 1.18, FENA suggesting pre-renal etiology. Pt reporting poor po intake. Does not appear volume overloaded on exam.   - Trend chemistries  - Provider to replete electrolytes given DARIO  - avoid nephrotoxins, renally dose meds as able  - Andrade placed  - UA microscopic     Endocrine:  # DM2   - Sliding scale insulin AC/HS (low)  - Goal BG < 180   - Holding PTA jaridance and mounjaro     ID:  # Elevated CRP   On hospital arrival, WBC 12.2 and  --> now 139. Procal repeatedly negative 0.07 --> 0.12. Leukocytosis resolved. No fevers documented since arrival. Presented with L ankle pain, but had rolled her ankle prior to admission, orthopedics did not feel she has a septic joint. Blood cultures from arrival NGTD. Pulm status stable. UA without signs of infection.  - Monitor WBC trend and fever curve  - Blood cx from admit NGTD, UA bland for infection  - Check COVID/Flu/RSV + RVP   - CXR   - No acute indication for abx       Hematology/oncology:    # NSCLC, adenocarcinoma- RUL,RLL  # S/p RLL lobectomy (2/2016):   Dx 1/2015. STAGE: IA2 [id number] poorly diff adeno RLL, then [id number] IA2 suspected NSCLC RUL, medically inoperable S/p RLL lobectomy (2/2016) and SBRT to RUL nodule (1/2020). Follows with Dr. Leung with recent clinic visit 7/29/24 w/ plan for 3 months follow up.  - Follow up with Oncology as scheduled  - Will need to consider long-term acx plan given new afib     Musculoskeletal:  # L ankle pain with difficulty ambulating   -PT/OT following   - LLE US negative for DVT  - L ankle xray negative for acute fracture or dislocation       Skin:  # No acute  "concerns       General Cares/Prophylaxis:    DVT Prophylaxis: Pneumatic Compression Devices  GI Prophylaxis: PPI  Restraints: Not indicated   Family Communication:  let for daughter Maureen   Code Status: Full     Lines/tubes/drains:  - COLTEN oliva   Della Andrade    Disposition:  - [facility] adult ICU      Jocelyn Alfaro, LOVE  Additional critical care time in addition to RRT, 30 minutes     Clinically Significant Risk Factors          # Hypocalcemia: Lowest Ca = 8.6 mg/dL in last 2 days, will monitor and replace as appropriate     # Hypoalbuminemia: Lowest albumin = 3.4 g/dL at 8/16/2024 11:21 PM, will monitor as appropriate    # Acute Kidney Injury, unspecified: based on a >150% or 0.3 mg/dL increase in last creatinine compared to past 90 day average, will monitor renal function           # DMII: A1C = 8.7 % (Ref range: <5.7 %) within past 6 months, PRESENT ON ADMISSION  # Obesity: Estimated body mass index is 32.38 kg/m  as calculated from the following:    Height as of this encounter: 1.676 m (5' 6\").    Weight as of this encounter: 91 kg (200 lb 9.9 oz)., PRESENT ON ADMISSION     # Financial/Environmental Concerns: none                        -----------------------------------------------------------------------    HISTORY PRESENTING ILLNESS:     Jenn Aparicio is a 69 year old female with PMH notable for O2 dependent COPD (2L O2 @ rest and 3L with exertion), Lung adenocarcinoma s/p RLL lobectomy (2/2016), SBRT to RUL nodule (1/2020) DMII c/b peripheral neuropathy, HTN, and HLD. Initially admitted 8/14/24 to medicine with L ankle pain & swelling accompanied by failure to thrive. Initially planned for discharge to TCU AM of 8/17, but instead transferred to ICU in the early hours of 8/17/24 for treatment of A fib with RVR.     Presented to ED 8/14/2024 with left ankle pain and swelling after she tripped on her O2 tubing.  Her pain was severe enough she was unable to ambulate.  Left ankle x-ray showed no fracture or " dislocation, left lower extremity ultrasound without DVT.  She was monitored for 2 days and was planned to be discharged to TCU a.m. of 8/17/2024.    Evening of 8/16/2024 heart rate noted to be elevated on pulse oximetry.  Telemetry applied and patient found to be in A-fib with RVR.  RRT called for patient evaluation.  Labs notable for Na+ 132, CR 1.18--> 1.35, Ca2 +8.5, Phos 1.9, Mg 2+1.7, , troponin 18 --> 15.  CBC unremarkable, lactic acid normal.  She was bolused with fluid and given IV metoprolol without resolution of her A-fib with RVR.  Transferred to ICU given mild hypotension for initiation of vasopressors and amiodarone infusion.  Unclear trigger of A-fib with RVR given lack of infectious findings.  Plan to volume resuscitate, optimize electrolytes, and obtain TTE in a.m.    REVIEW OF SYSTEMS: During RRT 10 point ROS positive for dizziness, palpitations, chest discomfort, abdominal fullness, inability to void, baseline neuropathy in bilateral lower extremities, baseline lower back and left ankle pain.  Following RRT patient states chest discomfort, palpitations, and dizziness have subsided.  All others negative    PAST MEDICAL HISTORY:   Past Medical History:   Diagnosis Date    Adenocarcinoma, lung (H)     Asthma     Chronic left-sided low back pain with left-sided sciatica 08/16/2024    Chronic respiratory failure with hypoxia (H) 08/16/2024    Ectopic pregnancy     Esophageal reflux     Pulmonary emphysema (H)     Very severe FEV1<30% predicted    Type II diabetes mellitus (H)      SURGICAL HISTORY:  Past Surgical History:   Procedure Laterality Date    2/8/16                R thoracotomy, RLL lobectomy (Dr. Cunha). Adenocarcinoma, 1.1 cm, assoicated with atypical adenomatous hyperplasia Right 02/08/2016    R thoracotomy, RLL lobectomy (Dr. Cunha). Adenocarcinoma, 1.1 cm, assoicated with atypical adenomatous hyperplasia    BRONCHOSCOPY, WITH BIOPSY, ROBOT ASSISTED N/A 7/19/2023    Procedure:  robot assisted Ion BRONCHOSCOPY, WITH BIOPSY;  Surgeon: Patria Garcia MD;  Location: [facility] OR    ENDOBRONCHIAL ULTRASOUND FLEXIBLE N/A 2023    Procedure: Endobronchial ultrasound flexible;  Surgeon: Patria Garcia MD;  Location:  OR    ESOPHAGOSCOPY, GASTROSCOPY, DUODENOSCOPY (EGD), COMBINED N/A 2022    Procedure: ESOPHAGOGASTRODUODENOSCOPY (EGD);  Surgeon: Travis Briseno MD;  Location:  GI    ESOPHAGOSCOPY, GASTROSCOPY, DUODENOSCOPY (EGD), COMBINED N/A 2023    Procedure: ESOPHAGOGASTRODUODENOSCOPY, WITH BIOPSY;  Surgeon: Chao Rodrigues DO;  Location:  GI    ORTHOPEDIC SURGERY      PHACOEMULSIFICATION CLEAR CORNEA WITH STANDARD INTRAOCULAR LENS IMPLANT Left 2023    Procedure: LEFT EYE PHACOEMULSIFICATION, CATARACT, WITH INTRAOCULAR LENS IMPLANT;  Surgeon: Re Keita MD;  Location: [facility] OR    PHACOEMULSIFICATION CLEAR CORNEA WITH STANDARD INTRAOCULAR LENS IMPLANT Right 2023    Procedure: RIGHT EYE PHACOEMULSIFICATION, CATARACT, WITH INTRAOCULAR LENS IMPLANT;  Surgeon: Re Keita MD;  Location: [facility] OR     SOCIAL HISTORY:  Social History     Socioeconomic History    Marital status: Single   Tobacco Use    Smoking status: Former     Current packs/day: 0.00     Types: Cigarettes     Quit date:      Years since quittin.6     Passive exposure: Past    Smokeless tobacco: Never    Tobacco comments:     2023 Patient using 14 mg patch, wants to have prescription for Nicotrol inhaler, took workbook   Vaping Use    Vaping status: Never Used   Substance and Sexual Activity    Alcohol use: Not Currently    Drug use: No    Sexual activity: Not Currently     Partners: Male   Social History Narrative    Ms. Aparicio lives in an apartment complex by herself as of May 2022. She has frequent smoke exposure from neighbors even when she is not smoking herself.      Social Determinants of Health     Interpersonal Safety: Not At Risk (2024)    Received from [facility]      Humiliation, Afraid, Rape, and Kick questionnaire     Fear of Current or Ex-Partner: No     Emotionally Abused: No     Physically Abused: No     Sexually Abused: No     FAMILY HISTORY:   Family History   Problem Relation Age of Onset    Thyroid Disease Mother     Cerebrovascular Disease Mother     Hypertension Mother     Hypertension Father     Glaucoma Father     Cancer Sister     Lung Cancer Sister     Diabetes Brother     Cancer Brother     Diabetes Brother     Cancer Brother     Diabetes Brother     Deep Vein Thrombosis (DVT) Daughter     Depression Daughter     Alcohol/Drug Other         self    Diabetes Other         self    Thyroid Disease Other         self    Asthma Other         self    Macular Degeneration No family hx of     Anesthesia Reaction No family hx of      ALLERGIES:   Allergies   Allergen Reactions    Aspirin      325mg     Bee Venom Anaphylaxis    Penicillins Hives    Acetaminophen GI Disturbance    Azithromycin Dizziness    Colon Care     Interferons Dermatitis    Metformin GI Disturbance     MEDICATIONS:  Current Facility-Administered Medications   Medication Dose Route Frequency Provider Last Rate Last Admin    acetaminophen (TYLENOL) tablet 650 mg  650 mg Oral Q4H PRN Yohana Santo PA-C        Or    acetaminophen (TYLENOL) Suppository 650 mg  650 mg Rectal Q4H PRN Yohana Santo PA-C        acetylcysteine (MUCOMYST) 10 % nebulizer solution 4 mL  4 mL Inhalation 4x Daily PRN Yohana Santo PA-C        albuterol (PROVENTIL HFA/VENTOLIN HFA) inhaler  2 puff Inhalation Q4H PRN Yohana Santo PA-C        amiodarone (NEXTERONE) 1.8 mg/mL in sodium chloride 0.9% in non-PVC container 500 mL ADULT STANDARD infusion  1 mg/min Intravenous Continuous Jocelyn Alfaro NP 33.33 mL/hr at 08/17/24 0143 1 mg/min at 08/17/24 0143    amiodarone (NEXTERONE) 1.8 mg/mL in sodium chloride 0.9% in non-PVC container 500 mL ADULT STANDARD infusion  0.5 mg/min Intravenous Continuous  Jocelyn Alfaro, NP        aspirin EC tablet 81 mg  81 mg Oral QAM Yohnaa Santo PA-C   81 mg at 08/16/24 0807    atorvastatin (LIPITOR) tablet 10 mg  10 mg Oral Daily Yohana Santo PA-C   10 mg at 08/16/24 0808    calcium carbonate (TUMS) chewable tablet 1,000 mg  1,000 mg Oral 4x Daily PRN Yohana Santo PA-C        carboxymethylcellulose PF (REFRESH LIQUIGEL) 1 % ophthalmic gel 1 drop  1 drop Both Eyes 4x Daily Yohana Santo PA-C   1 drop at 08/16/24 2038    cetirizine (zyrTEC) tablet 10 mg  10 mg Oral QAM Yohana Santo PA-C   10 mg at 08/16/24 0807    cyanocobalamin (VITAMIN B-12) tablet 500 mcg  500 mcg Oral Daily Yohana Santo PA-C   500 mcg at 08/16/24 0808    cyclobenzaprine (FLEXERIL) tablet 5 mg  5 mg Oral TID PRN Yohana Santo PA-C        glucose gel 15-30 g  15-30 g Oral Q15 Min PRN Yohana Santo PA-C        Or    dextrose 50 % injection 25-50 mL  25-50 mL Intravenous Q15 Min PRN Yohana Santo PA-C        Or    glucagon injection 1 mg  1 mg Subcutaneous Q15 Min PRN Yohana Santo PA-C        DULoxetine (CYMBALTA) DR capsule 30 mg  30 mg Oral BID Yohana Santo PA-C   30 mg at 08/16/24 2038    fluticasone (FLONASE) 50 MCG/ACT spray 1 spray  1 spray Both Nostrils BID Yohana Santo PA-C   1 spray at 08/16/24 2046    fluticasone-vilanterol (BREO ELLIPTA) 200-25 MCG/ACT inhaler 1 puff  1 puff Inhalation Daily Yohana Santo PA-C   1 puff at 08/16/24 0812    And    umeclidinium (INCRUSE ELLIPTA) 62.5 MCG/ACT inhaler 1 puff  1 puff Inhalation Daily Yohana Santo PA-C   1 puff at 08/16/24 0813    HYDROmorphone (PF) (DILAUDID) injection 0.3 mg  0.3 mg Intravenous Q4H PRN Freddie Ramírez MD   0.3 mg at 08/16/24 2034    insulin aspart (NovoLOG) injection (RAPID ACTING)  1-3 Units Subcutaneous TID AC Yohana Santo PA-C        insulin aspart (NovoLOG) injection (RAPID ACTING)  1-3 Units  Subcutaneous At Bedtime Yohana Santo PA-C        ipratropium - albuterol 0.5 mg/2.5 mg/3 mL (DUONEB) neb solution 3 mL  1 vial Nebulization Q6H PRN Yohana Santo PA-C        Lidocaine (LIDOCARE) 4 % Patch 2 patch  2 patch Transdermal Q24h Yohana Santo PA-C   2 patch at 08/16/24 2037    lidocaine (LMX4) cream   Topical Q1H PRN Yohana Santo PA-C        lidocaine (XYLOCAINE) 2 % external gel   Urethral Once Jocelyn Alfaro NP        lidocaine 1 % 0.1-1 mL  0.1-1 mL Other Q1H PRN Yohana Santo PA-C        [Held by provider] losartan (COZAAR) tablet 50 mg  50 mg Oral Daily Yohana Santo PA-C   50 mg at 08/16/24 0807    magnesium sulfate 2 g in 50 mL sterile water intermittent infusion  2 g Intravenous Once Jocelyn Alfaro NP        metoprolol (LOPRESSOR) injection 5 mg  5 mg Intravenous Q5 Min PRN Jocelyn Alfaro NP   5 mg at 08/16/24 2344    naloxone (NARCAN) injection 0.2 mg  0.2 mg Intravenous Q2 Min PRN Freddie Ramírez MD        Or    naloxone (NARCAN) injection 0.4 mg  0.4 mg Intravenous Q2 Min PRN Freddie Ramírez MD        Or    naloxone (NARCAN) injection 0.2 mg  0.2 mg Intramuscular Q2 Min PRN Freddie Ramírez MD        Or    naloxone (NARCAN) injection 0.4 mg  0.4 mg Intramuscular Q2 Min PRN Freddie Ramírez MD        norepinephrine (LEVOPHED) 4 mg in  mL PERIPHERAL infusion  0.01-0.125 mcg/kg/min Intravenous Continuous Jocelyn Alfaro NP 10.5 mL/hr at 08/17/24 0149 0.03 mcg/kg/min at 08/17/24 0149    omeprazole (PriLOSEC) CR capsule 20 mg  20 mg Oral Daily Yohana Santo PA-C   20 mg at 08/16/24 0808    ondansetron (ZOFRAN ODT) ODT tab 4 mg  4 mg Oral Q6H PRN Yohana Santo PA-C   4 mg at 08/16/24 0311    Or    ondansetron (ZOFRAN) injection 4 mg  4 mg Intravenous Q6H PRN Yohana Santo PA-C        oxyCODONE (ROXICODONE) tablet 5 mg  5 mg Oral Q4H PRN Freddie Ramírez MD   5 mg at 08/17/24 0024    oxyCODONE  IR (ROXICODONE) tablet 10 mg  10 mg Oral Q4H PRN Freddie Ramírez MD   10 mg at 08/16/24 1736    polyethylene glycol-propylene glycol (SYSTANE ULTRA) ophthalmic solution 1 drop  1 drop Both Eyes 4x Daily Yohana Santo PA-C   1 drop at 08/16/24 2038    potassium chloride pablo ER (KLOR-CON M10) CR tablet 20 mEq  20 mEq Oral Once Jocelyn Alfaro NP        Pregabalin (LYRICA) capsule 200 mg  200 mg Oral BID Freddie Ramírez MD   200 mg at 08/16/24 2038    [Held by provider] roflumilast (DALIRESP) tablet 500 mcg  500 mcg Oral QAM Yohana Santo PA-C        senna-docusate (SENOKOT-S/PERICOLACE) 8.6-50 MG per tablet 1 tablet  1 tablet Oral BID PRN Yohana Santo PA-C        Or    senna-docusate (SENOKOT-S/PERICOLACE) 8.6-50 MG per tablet 2 tablet  2 tablet Oral BID PRN Yohana Santo PA-C   2 tablet at 08/15/24 2015    senna-docusate (SENOKOT-S/PERICOLACE) 8.6-50 MG per tablet 1 tablet  1 tablet Oral BID Yohana Santo PA-C        Or    senna-docusate (SENOKOT-S/PERICOLACE) 8.6-50 MG per tablet 2 tablet  2 tablet Oral BID Yohana Santo PA-HAWA   2 tablet at 08/16/24 2037    sodium chloride (PF) 0.9% PF flush 3 mL  3 mL Intracatheter Q8H Yohana Santo PA-C   3 mL at 08/15/24 2031    sodium chloride (PF) 0.9% PF flush 3 mL  3 mL Intracatheter q1 min prn Yohana Santo PA-C   3 mL at 08/16/24 2034    sodium chloride 0.9 % infusion   Intravenous Continuous Jocelyn Alfaro NP        sodium chloride 0.9% BOLUS 500 mL  500 mL Intravenous Once Pickel, Jocelyn E, NP        sodium phosphate 9 mmol in sodium chloride 0.9 % 250 mL intermittent infusion  9 mmol Intravenous Once Jocelyn Alfaro E, NP           PHYSICAL EXAMINATION:  Temp:  [97.5  F (36.4  C)-99  F (37.2  C)] 99  F (37.2  C)  Pulse:  [102-154] 152  Resp:  [16-24] 24  BP: (113-156)/() 156/137  SpO2:  [91 %-97 %] 91 %  General: Awake and alert, diaphoretic during RRT, now without  diaphoresis  HEENT: Normocephalic, atraumatic, oral mucosa moist, neck supple  Neuro: A&Ox3, pupils 2 mm, PERRLA, motor exam grossly nonfocal  Pulm/Resp: Clear breath sounds bilaterally without rhonchi or crackles.  Intermittent low pitched expiratory wheeze, on baseline 2 L nasal cannula  CV: RRR, irregularly irregular, tachycardic, extremities warm and well-perfused, pulses palpable, appears euvolemic on exam  Abdomen: Soft, distended, nontender  :  javed catheter in place, micki and clear  Incisions/Skin: No wounds, rashes, or lesions on limited exam    LABS: Reviewed.   Arterial Blood Gases   No lab results found in last 7 days.  Complete Blood Count   Recent Labs   Lab 08/17/24  0205 08/16/24  0700 08/15/24  1101 08/14/24  1911   WBC 10.3 10.7 10.3 12.2*   HGB 12.5 12.8 12.8 14.1    238 249 253     Basic Metabolic Panel  Recent Labs   Lab 08/17/24  0205 08/17/24  0103 08/16/24  2321 08/16/24  2240 08/16/24  0806 08/16/24  0700 08/15/24  1136 08/15/24  1101   *  --  132*  --   --  137  --  139   POTASSIUM 3.6  --  3.5  --   --  4.1  --  3.6   CHLORIDE 97*  --  97*  --   --  99  --  100   CO2 25  --  23  --   --  21*  --  25   BUN 15.7  --  13.9  --   --  9.5  --  12.0   CR 1.35*  --  1.18*  --   --  0.49*  --  0.50*   * 166* 171* 169*   < > 146*   < > 152*    < > = values in this interval not displayed.     Liver Function Tests  Recent Labs   Lab 08/16/24  2321 08/15/24  1101   AST 21  --    ALT 6  --    ALKPHOS 89  --    BILITOTAL 0.6  --    ALBUMIN 3.4*  --    INR  --  1.15     Coagulation Profile  Recent Labs   Lab 08/15/24  1101   INR 1.15       IMAGING:  No results found for this or any previous visit (from the past 24 hour(s)).

## 2024-08-17 NOTE — PROGRESS NOTES
[facility]    ICU Progress Note       Date of Hospital Admission: 8/14/24  Date of ICU Admission: 8/17/24  Reason for Critical Care Admission: A fib with RVR requiring amio gtt and low-dose vasopressors   Date of Service (when I saw the patient): 08/17/2024    Assessment: Critical Care   Jenn Aparicio is a 69 year old female with PMH notable for O2 dependent COPD (2L O2 @ rest and 3L with exertion), Lung adenocarcinoma s/p RLL lobectomy (2/2016), SBRT to RUL nodule (1/2020) DMII c/b peripheral neuropathy, HTN, and HLD. Initially admitted 8/14/24 to medicine with L ankle pain & swelling accompanied by failure to thrive. Initially planned for discharge to TCU AM of 8/17, but instead transferred to ICU in the early hours of 8/17/24 for treatment of new onset A fib with RVR accompanied by DARIO      Major Changes Today:  - schedule Tylenol for pain with goal of narcotic reduction  - Change Xopenex nebs to BID and PRN  - Albumin 5%, 25Gm x 1  - wean Norepi as able  - BMP at noon today please  - discontinue DuoNebs PRN  - Flutter valve BID with Nebs  - bisacodyl supp PRN      Plan: Critical Care   Neuro:  # Chronic low back pain  # Acute L ankle pain   # Peripheral neuropathy   History of chronic low back pain and imaging from 6/26/24 showed possible metastatic lesion. Patient reiterates that her back pain has worsened, with radiation down her L LE with weakness and that contributed to her ankle twisting. Also reports she fell over her oxygen tubing and her tank fell on her ankle.  Underwent MRI lumbar spine to rule out worsening metastatic spine disease, myelopathy and showed stable symptoms. D/W Neurosurgery service and recommended outpatient follow up .  - Follow-up with neurosurg outpt   - Continue duloxetine 30 mg BID  - Pregabalin decreased from 200 mg BID to 100 mg BID in light of DARIO - DARIO resolved but will assess need for increase   - Lidocaine patch  - Tylenol 650 mg q4h  PRN - schedule 975 mg q 8 hr with goal to reduce narcotic usage   - Flexeril 5 mg 3 times daily as needed  - Hydromorphone 0.3 mg IV every 4 hours as needed -  minimize use as able   - Oxycodone 5 to 10 mg every 4 hours as needed - minimize use as able      Pulmonary:  # Chronic hypoxic respiratory failure, 2L home O2  # Severe O2 dependent COPD, not currently exacerbated   Left-sided exp wheezing noted today   -Chest x-ray showing fibrosis   -Continue PTA Flonase, cetirizine  -Continue Breo and Incruse daily inhalers  -Xopenex Nebs BID and PRN  - Mucomyst Nebs PRN  - discontinue DuoNebs PRN   -Will hold off on resuming PTA Roflumilast, as this has been reported to precipitate A-fib     Cardiovascular:  # Atrial fibrillation with RVR, new onset  # Hypotension 2/2 above, exacerbated by hypovolemia  # Mild troponemia 2/2 above   # HTN, h/o  # HLD   RRT called evening of 8/16/2024 for A-fib with RVR.  HR  ~ 160-200.  BP initially acceptable, although much lower than her baseline which was ~ 140-180 day prior.  She has never been in A-fib before.  On exam she was warm and well-perfused, however diaphoretic.  Patient reported dizziness, palpitations, and chest discomfort when heart rates severely elevated.  Symptoms have thus resolved after improving heart rates.  Labs notable for new DARIO CR 1.18 and mild troponinemia 18.  Continues to have no signs of systemic infection and lactic acid is normal.  Initially treated with metoprolol 5 mg IV given with modest heart rate improvement.  BP became hypotensive she was bolused with 1L fluid in divided doses and started on amiodarone.   Norepinephrine gtt started for symptomatic HoTN.   8/17 ~0445 converted back to NSR   - EKG showed Afib with RVR at time of RRT, tele now showing NSR  - TTE 8/17: EF > 65%, unremarkable   Left ventricular function is normal.The ejection fraction is > 65%.  Global right ventricular function is normal.  Mild aortic insufficiency.  The inferior vena  cava is normal.  No pericardial effusion.  - Troponin 18 --> 15, LA 1.5, no leukocytosis  - Optimize electrolytes, K+ > 4.0, Mg2+ > 2.0  - Goal MAP > 65 mmHg               - Fluids: 0.5L bolus x2 (8/16 PM), MIVF at 125 ml/hr                  - metoprolol 5 mg IV x1 (8/16)               - Amiodarone bolus + gtt  (8/16 - present)                - Hold losartan 50 mg every day        GI/Nutrition:  # Risk for malnutrition  # Obesity  # Constipation  # GERD  - RD consult for malnutrition assessment    - Moderate CHO 60 g every day, continue  - Scheduled and PRN bowel regimen   - Continue PTA PPI      Renal/Fluids/Electrolytes:  # DARIO, likely prerenal in the setting of decrease po intake and hypotension - resolved   # Hyponatremia - resolved   # Hypophosphatemia - resolved   # Hypoalbuminemia  # hypocalcemia   Baseline Cr ~ 0.5-0.6. Renal function was normal on hospital arrival. BMP drawn during RRT showing new DARIO Cr 1.18 --> 1.35 on 8/17 early AM, FENA suggesting pre-renal etiology. Pt reporting poor po intake. Does not appear volume overloaded on exam.   - Serial chemistries  - avoid nephrotoxins  - Andrade placed  - UA with glucose and ketones, trace WBC, neg nitrites and leukocyte esterase: not likely UTI      Endocrine:  # DM2   # Obesity   - Sliding scale insulin AC/HS (low)  - Goal BG < 180   - Holding PTA jaridance and mounjaro      ID:  # Elevated CRP   On hospital arrival, WBC 12.2 and  --> now 139. Procal repeatedly negative 0.07 --> 0.12. Leukocytosis resolved. No fevers documented since arrival. Presented with L ankle pain, but had rolled her ankle prior to admission, orthopedics did not feel she has a septic joint. Blood cultures from arrival NGTD. Pulm status stable. UA without signs of infection.  - Monitor WBC trend and fever curve  - Blood cx from admit NGTD, UA bland for infection  - COVID/Flu/RSV + RVP: neg  - CXR without evidence of PNA   - No acute indication for abx        "  Hematology/oncology:    # NSCLC, adenocarcinoma- RUL,RLL  # S/p RLL lobectomy (2/2016):   Dx 1/2015. STAGE: IA2 [id number] poorly diff adeno RLL, then [id number] IA2 suspected NSCLC RUL, medically inoperable S/p RLL lobectomy (2/2016) and SBRT to RUL nodule (1/2020). Follows with Dr. Leung with recent clinic visit 7/29/24 w/ plan for 3 months follow up.  - Follow up with Oncology as scheduled  - Will need to consider long-term acx plan given new afib      Musculoskeletal:  # L ankle pain with difficulty ambulating   -PT/OT following   - LLE US negative for DVT  - L ankle xray negative for acute fracture or dislocation         Skin:  # No acute concerns         General Cares/Prophylaxis:    DVT Prophylaxis: Pneumatic Compression Devices, HSQ  GI Prophylaxis: PPI  Restraints: Not indicated   Family Communication: will call  daughter Maureen   Code Status: Full      Lines/tubes/drains:  - PIV x2   - Andrade     Disposition:  - [facility] adult ICU    The patient's care was discussed with the Attending Physician, Dr. Spann .  Critical care time exclusive of procedures: 47 min     BENOIT [facility]  Securely message with [employer] (more info)  Text page via [facility] Paging/Directory     ______________________________________________________________    Interval History   Patient scheduled to go to TCU however then went into symptomatic a fib with RVR, labs showing pre-renal DARIO, and hypovolemic shock requiring IVF and pressors.   Patient endorses LLE pain and back pain, coughing up \"green mucous\" and occasional shortness of breath (none at time of visit). She endorses chest pain with coughing butno cardiac chest pain. She denies nausea nor constipation.      Physical Exam   Vital Signs: Temp: 98.7  F (37.1  C) Temp src: Oral BP: 109/50 Pulse: 91   Resp: 17 SpO2: 99 % O2 Device: Nasal cannula Oxygen Delivery: 2 LPM  Weight: 200 lbs 9.9 oz    GEN: sleepy this AM " but awoke easily to voice, not in distress  HEENT:  Normocephalic, atraumatic, trachea midline, Pupils PERRL  CV: RRR in 70s upon visit, occasional PVC , no gallops, rubs, or murmurs appreciated   PULM/CHEST: left sided exp wheeze noted, diminished bases, symmetric chest rise  GI: hypoactive bowel sounds, obese, soft, non-tender,   : javed catheter in place, urine yellow and clear  EXTREMITIES: 1-2+ peripheral edema, moving all extremities, peripheral pulses intact, left ankle tender to movement.   NEURO: sleepy but awakens to voice, alert, following commands   PSYCH:  Affect: appropriate, not  anxious, not altered,         Data   I reviewed all medications, new labs and imaging results over the last 24 hours.  Arterial Blood Gases   No lab results found in last 7 days.    Complete Blood Count   Recent Labs   Lab 08/17/24  0205 08/16/24  0700 08/15/24  1101 08/14/24  1911   WBC 10.3 10.7 10.3 12.2*   HGB 12.5 12.8 12.8 14.1    238 249 253       Basic Metabolic Panel  Recent Labs   Lab 08/17/24  0427 08/17/24  0205 08/17/24  0103 08/16/24  2321 08/16/24  0806 08/16/24  0700 08/15/24  1136 08/15/24  1101   NA  --  132*  --  132*  --  137  --  139   POTASSIUM  --  3.6  --  3.5  --  4.1  --  3.6   CHLORIDE  --  97*  --  97*  --  99  --  100   CO2  --  25  --  23  --  21*  --  25   BUN  --  15.7  --  13.9  --  9.5  --  12.0   CR  --  1.35*  --  1.18*  --  0.49*  --  0.50*   * 190* 166* 171*   < > 146*   < > 152*    < > = values in this interval not displayed.       Liver Function Tests  Recent Labs   Lab 08/16/24  2321 08/15/24  1101   AST 21  --    ALT 6  --    ALKPHOS 89  --    BILITOTAL 0.6  --    ALBUMIN 3.4*  --    INR  --  1.15       Pancreatic Enzymes  No lab results found in last 7 days.    Coagulation Profile  Recent Labs   Lab 08/15/24  1101   INR 1.15       IMAGING:  No results found for this or any previous visit (from the past 24 hour(s)).

## 2024-08-17 NOTE — PROGRESS NOTES
8/17 0200    Admitted/transferred from: 8A  Reason for admission/transfer: Mgmt of a fib  2 RN skin assessment: completed by Nelda RN and Mariposa RN (critical care flyers)  Result of skin assessment and interventions/actions: Skin WDL  Height, weight, drug calc weight: Done  Patient belongings (see Flowsheet)  MDRO education added to care planN/A  ?  Rodolfo Zambrano RN on 8/17/2024 at 5:04 AM

## 2024-08-17 NOTE — PROGRESS NOTES
"CLINICAL NUTRITION SERVICES - ASSESSMENT NOTE     Nutrition Prescription    RECOMMENDATIONS FOR MDs/PROVIDERS TO ORDER:  None at this time    Malnutrition Status:    Patient does not meet two of the established criteria necessary for diagnosing malnutrition    Recommendations already ordered by Registered Dietitian (RD):  -Glucerna chocolate w/ meals    Future/Additional Recommendations:  Monitor PO intake, supplement use, labs, wt trends.      REASON FOR ASSESSMENT  Jenn Aparicio is a/an 69 year old female assessed by the dietitian for Provider Order - malnutrition assessment    CLINICAL HISTORY  PMH significant for O2 dependent COPD, lung adenocarcinoma s/p RLL lobectomy, SBRT to RUL nodule, T2DM c/b peripheral neuropathy and HTN, and HLD. Initially admitted to [facility] from ED 8/14/24 for evaluation and care of L ankle pain and swelling. Plan to discharge to TCU on 8/16/24 was delayed d/t RRT caled for new onset Afib w/ RVR and transferred to ICU 8/17/24 for treatment of Afib w/ RVR and DARIO.     NUTRITION HISTORY  RD met with Jenn at bedside who reports poor appetite but was able to some of a pancake this morning and plans to order lunch. Pt is agreeable to Glucerna w/ meals to encourage intake. Pt is on continuous fluids and received mag, K and phos replacements this morning.     GI: Pt denied N/V/D/C, LBM 8/16    CURRENT NUTRITION ORDERS  Diet: Moderate Consistent Carbohydrate  Supplement: none  Intake/Tolerance: ate some of a pancake this morning per pt, limited intake hx in chart    LABS  Na 132L  Cr 1.35H  GFR 42L  Mag 2.4H  BG POCT 154-184  Hgb A1c 8.7H (7/9)    MEDICATIONS  Vit B12, cymbalta, novolog, prilosec, miralax, senokot-s, amiodarone, NS @ 100mL/hr, zofran prn, oxycodone prn    ANTHROPOMETRICS  Height: 167.6 cm (5' 6\")  Most Recent Weight: 91 kg (200 lb 9.9 oz)    IBW: 59.1 kg   BMI: Obesity Grade I BMI 30-34.9  Weight History:   Wt Readings from Last 15 Encounters:   08/17/24 91 kg (200 lb 9.9 oz) "   08/16/24 92.9 kg (204 lb 12.9 oz)      08/15/24 92 kg (202 lb 13.2 oz)      08/14/24 87.4 kg (192 lb 10.9 oz)      07/29/24 90.7 kg (200 lb)   07/22/24 90.9 kg (200 lb 6.4 oz)   06/13/24 93 kg (205 lb)   04/29/24 93.7 kg (206 lb 8 oz)   04/09/24 91.8 kg (202 lb 6.4 oz)   01/18/24 93.8 kg (206 lb 14.4 oz)   01/18/24 93.8 kg (206 lb 14.4 oz)   12/18/23 95.2 kg (209 lb 14.4 oz)   12/13/23 96 kg (211 lb 11.2 oz)   10/27/23 95.3 kg (210 lb)   10/26/23 95.1 kg (209 lb 9.6 oz)   10/05/23 93.8 kg (206 lb 12.8 oz)   09/30/23 90.2 kg (198 lb 12.8 oz)   09/18/23 94.9 kg (209 lb 4.8 oz)   Wt fluctuations noted    Dosing Weight: 67 kg (adjusted)    ASSESSED NUTRITION NEEDS  Estimated Energy Needs: 1675 - 2010 kcals/day (25 - 30 kcals/kg)  Justification: Obese  Estimated Protein Needs: 72 - 91 grams protein/day (0.8 - 1 grams of pro/kg)  Justification: Obesity guidelines  Estimated Fluid Needs: 1 mL/kcal  Justification: Maintenance or Per Provider    PHYSICAL FINDINGS  See malnutrition section below.  Johan: 15  Poor dentition - pt denies issues with eating     MALNUTRITION  % Intake: Unable to assess  % Weight Loss: None noted  Subcutaneous Fat Loss: None observed  Muscle Loss: None observed  Fluid Accumulation/Edema: Mild - L leg per flowsheets  Malnutrition Diagnosis: Patient does not meet two of the established criteria necessary for diagnosing malnutrition    NUTRITION DIAGNOSIS  Inadequate oral intake related to poor appetite as evidenced by minimal intake since admission    INTERVENTIONS  Implementation  Nutrition Education: RD role in care   Medical food supplement therapy     Goals  Patient to consume % of nutritionally adequate meal trays TID, or the equivalent with supplements/snacks.     Monitoring/Evaluation  Progress toward goals will be monitored and evaluated per protocol.    Kathryn Franco, MPH, RDN, LD  Clinical Dietitian  [place] 5B/10A ICU Vocera, Teams, or desk [phone]  Weekend/Holiday Vocera:  Weekend Holiday Clinical Dietitian [Multi Site Groups]

## 2024-08-17 NOTE — PROGRESS NOTES
Brief Medicine Note  Contacted by nursing regarding patient with new onset tachycardia and mildly hypotensive. Vitals otherwise at baseline (on 2 L of oxygen at baseline). Evaluated patient at bedside. Appears comfortable. Endorses chronic back pain and intermittent chest pain. Per nurse, patient at baseline. Heart rate sounds somewhat irregular, but unable to auscultate clearly.   - Stat EKG ordered  - Stat troponin, BMP, mag, phos    Marycarmen Kinney PA-C  Hospitalist Service

## 2024-08-17 NOTE — PLAN OF CARE
Shift 08/17 0200 - 08/17 0700    Major Shift Events: Pt admitted to 10A. Amiodarone started. Norepi started. 500ml NS bolus administered. Continuous fluids of 100ml/hr started after bolus finished. Mag, K+ and Phos replaced. Converted out of a fib ~0445.    Neuro: A&Ox4. Afebrile. Reporting aching lower back pain (not new). Oxycodone PRN administered.  Cardiac: Afib in the 130s/140s until converted out at ~0445. Converted to Sinus rhythm in the 90s.  Respiratory: Lung sounds clear/diminished.  GI: Consistent carb diet  : Andrade in place  Skin: CDI  Access: PIVx4    High touch surface disinfection: Completed    Plan: Continue plan of care, wean pressors as able.    Refer to flowsheet for detailed information.    Rodolfo Zambrano RN on 8/17/2024 at 7:00 AM        Goal Outcome Evaluation:           Overall Patient Progress: no changeOverall Patient Progress: no change    Outcome Evaluation: Pt admitted to ICU. Converted out of A fib ~ 0445.

## 2024-08-17 NOTE — PHARMACY-ADMISSION MEDICATION HISTORY
Pharmacist Admission Medication History    Admission medication history is complete. The information provided in this note is only as accurate as the sources available at the time of the update.    Information Source(s): Patient and CareEverywhere/SureScripts via in-person    Pertinent Information: n/a    Changes made to PTA medication list:  Added: None  Deleted: None  Changed: Omeprazole 20mg daily -> 20mg BID    Allergies reviewed with patient and updates made in EHR: yes. Patient has listed allergy to ASA, however patient does take ASA 81mg daily.     Medication History Completed By: Gab Mcconnell [facility] 8/17/2024 3:44 PM    PTA Med List   Medication Sig Last Dose    acetaminophen (TYLENOL) 325 MG tablet Take 2 tablets (650 mg) by mouth every 4 hours as needed for mild pain or other (and adjunct with moderate or severe pain or per patient request)     acetylcysteine (MUCOMYST) 10 % nebulizer solution Inhale 4 mLs into the lungs 4 times daily as needed for mucolysis/respiratory distress Past Month    albuterol (PROAIR HFA/PROVENTIL HFA/VENTOLIN HFA) 108 (90 Base) MCG/ACT inhaler USE 1 OR 2 INHALATIONS EVERY 4 HOURS AS NEEDED Past Month    albuterol (PROVENTIL) (2.5 MG/3ML) 0.083% neb solution Take 1 vial (2.5 mg) by nebulization 4 times daily Past Month    alpha-lipoic acid 600 MG capsule Take 1 capsule (600 mg) by mouth daily Past Week    aspirin 81 MG EC tablet Take 1 tablet (81 mg) by mouth every morning Past Week    atorvastatin (LIPITOR) 10 MG tablet Take 1 tablet (10 mg) by mouth daily Past Week    carboxymethylcellulose (REFRESH LIQUIGEL) 1 % ophthalmic solution Place 1 drop into both eyes 4 times daily Past Week    cetirizine (ZYRTEC) 10 MG tablet Take 1 tablet (10 mg) by mouth every morning Past Week    cyanocobalamin (VITAMIN B-12) 500 MCG tablet Take 1 tablet (500 mcg) by mouth daily Past Week    cyclobenzaprine (FLEXERIL) 5 MG tablet Take 1 tablet (5 mg) by mouth 3 times daily as needed for muscle  spasms Past Month    DULoxetine (CYMBALTA) 30 MG capsule Take 1 capsule twice a day. Past Week    empagliflozin (JARDIANCE) 25 MG TABS tablet Take 1 tablet (25 mg) by mouth every morning Past Week    fluticasone (FLONASE) 50 MCG/ACT nasal spray Spray 1 spray into both nostrils 2 times daily Use at night before bed Past Week    Fluticasone-Umeclidin-Vilanterol (TRELEGY ELLIPTA) 200-62.5-25 MCG/ACT oral inhaler USE 1 INHALATION DAILY Past Week    GLUCOSAMINE-CHONDROITIN -400 MG tablet TAKE 1 TABLET DAILY (Patient taking differently: Take 1 tablet by mouth every evening) Past Week    losartan (COZAAR) 50 MG tablet Take 1 tablet (50 mg) by mouth daily Past Week    omega-3 acid ethyl esters (LOVAZA) 1 g capsule Take 2 capsules (2 g) by mouth 2 times daily Past Week    omeprazole (PRILOSEC) 20 MG DR capsule Take 1 capsule (20 mg) by mouth daily (Patient taking differently: Take 20 mg by mouth 2 times daily) Past Week    oxyCODONE (ROXICODONE) 5 MG tablet Take 0.5-1 tablets (2.5-5 mg) by mouth every 4 hours as needed for moderate to severe pain (IF pain not managed with non-pharmacological and non-opioid interventions)     polyethylene glycol-propylene glycol (SYSTANE) 0.4-0.3 % SOLN ophthalmic solution Place 1 drop into both eyes 4 times daily Past Week    pregabalin (LYRICA) 150 MG capsule Take 1 capsule (150 mg) by mouth 2 times daily TAKE 1 CAPSULE(150 MG) BY MOUTH TWICE DAILY Past Week    pregabalin (LYRICA) 200 MG capsule Take 1 capsule (200 mg) by mouth 2 times daily for 30 days     roflumilast (DALIRESP) 500 MCG TABS tablet Take 1 tablet (500 mcg) by mouth every morning Past Week    umeclidinium (INCRUSE ELLIPTA) 62.5 MCG/ACT inhaler Inhale 1 puff into the lungs daily

## 2024-08-17 NOTE — PROGRESS NOTES
Care Management Follow Up    Length of Stay (days): 3    Expected Discharge Date: 08/16/2024     Concerns to be Addressed: discharge planning     Patient plan of care discussed at interdisciplinary rounds: Yes    Anticipated Discharge Disposition: Transitional Care     Anticipated Discharge Services:  (Post Acute Therapies)  Anticipated Discharge DME:  (SNF to provide all necessary DME)    Patient/family educated on Medicare website which has current facility and service quality ratings: yes  Education Provided on the Discharge Plan: Yes  Patient/Family in Agreement with the Plan: yes    Referrals Placed by CM/SW: Post Acute Facilities    Accepted:    [facility]  [address] [place] [address]  Phone: [phone]  [facility]ia[facility] - Melissa Mireles  P: [phone]  F: [phone]  Email: [email or web address]  8/17: Plan was for patient to discharge here but patient no longer med ready    [address] [address]  [phone]   8/16: Initial referral sent. Accepted    Pending:    [facility]  [address] [place] [address]  [address]  P: [phone]  P: [phone] - Admissions  F: [phone]   8/16: Initial referral sent     [facility]  [address] [place]  [address]  P: [phone]  Admissions: [phone]  F: [phone]   F: [phone]   8/16: Initial referral sent    Declined:    [facility]  [address] [place]  [address]  P: [phone]  F: [phone]  Admissions: [phone]   8/16: Initial referral sent. Declined, bed not available until Tues or later    Private pay costs discussed: Not applicable    Additional Information:    Rapid response called overnight and patient was transferred to 10ICU.     TERESSA called charge RN on 10ICU, confirmed that patient will not be discharging today.    TERESSA called [facility] (P: [phone]), the ride was  already canceled.     called [place] liaison to inform them that patient is no longer ready for discharge. Left VM with callback information if questions.          MARGARITA SchafferW  8/17/2024       Social Work and Care Management Department       SEARCHABLE in [facility] - search SOCIAL WORK       [place] (0800 - 1630) Saturday and Sunday     Units: 4A Vocera, 4C Vocera, & 4E Vocera        Units: 5A 3451-1988 Vocera, 5A 7994-7388 Vocera , BMT SW 1 BMT SW 2, BMT SW 3 & BMT SW 4  5C Off Service 5401 - 5416  5C Off Service [phone]     Units: 6A Vocera & 6B Vocera      Units: 6C Vocera     Units: 7A Vocera & 7B Vocera      Units: 7C Med Surg 7401 thru 7418 and 7C Med Surg 7502 thru 7521      Unit: [place] ED Vocera & [place] Obs Vocera     [facility] (9822-0801) Saturday and Sunday      Units: 5 Ortho Vocera, 5 Med Surg Vocera & WB ED Vocera     Units: 6 Med Surg Vocera, 8 Med Surg Vocera, & 10 ICU Vocera      After hours Vocera [facility] and After Hours Vocera [place]     Saturday & Sunday (1630 - 0000)    Mon-Fri (4653-2002)     [facility] Recognized Holidays  ([id number])    Units: ALL   - see above VOCERA links to units and after hours

## 2024-08-17 NOTE — PROVIDER NOTIFICATION
08/17/24 0300   Call Information   Date of Call 08/16/24   Time of Call 2322   Name of person requesting the team JOHANN Martinez   Title of person requesting team RN   RRT Arrival time 2324   Time RRT ended 0230   Reason for call   Type of RRT Adult   Primary reason for call Cardiovascular   Cardiovascular HR greater than 160   Was patient transferred from the ED, ICU, or PACU within last 24 hours prior to RRT call? Yes   SBAR   Situation Patients HR increased to 180's-200s afib with RVR (new onset)   Background hx of failure to thrive, Hypoxia, diabetes type 2.   Notable History/Conditions Diabetes;COPD   Assessment Patient's HR was in the high 180s-200s upon arrival of RRT, patient was slightly diaphoretic, and c/o of chest pains.   Interventions ECG;Fluid bolus;Labs;Meds  (ECG/labs done prior to RRT)   Adjustments to Recommend Transfer patient to ICU   Patient Outcome   Patient Outcome Transferred to  (ICU)   RRT Team   Attending/Primary/Covering Physician ANN Murillo and YESSI PatelC   Date Attending Physician notified 08/16/24   Time Attending Physician notified 2322   Physician(s) ANN Murillo and Marycarmen DICKSONC   Lead RN Juan WILLS   RN Martell, RN, Nelda RN, and Mariposa RN   RT NA   Other staff Charge RN Quyen, JOHANN, VIELKA Frost, ba Pierre   Post RRT Intervention Assessment   Post RRT Assessment Transferred to ICU   Date Follow Up Done 08/17/24   Time Follow Up Done 0415   Comments patient continues to be monitored in ICU. BP's and HR still varying.

## 2024-08-17 NOTE — CODE/RAPID RESPONSE
Rapid Response Team Note    Assessment   A rapid response was called on Jenn Aparicio due to  new onset Afib with RVR .    Plan   -  BMP, mag, phos, and troponin pending  -  EKG showed Afib with RVR   -  0.5L bolus  -  Metoprolol 5 mg IV x1 with partial response  - Amiodarone bolus + gtt given borderline BP   -  The Internal Medicine primary team was at bedside  -  Disposition: The patient will be transferred to the ICU.  -  Reassessment and plan follow-up will be performed by the accepting ICU team    Jocelyn Alfaro NP  Rapid Response Team ANN  Securely message with [employer]     Medical Decision Making       40 MINUTES SPENT BY ME on the date of service doing chart review, history, exam, documentation & further activities per the note.      Jenn is critically ill with atrial fibrillation with rapid ventricular response. These features are alarming and indicate that the patient is at imminent risk of life-threatening organ failure. Acute deterioration is being managed by the following critical interventions: anti-arrhythmics, IV fluids, and and vasopressors    Total Critical Care time spent by me, excluding procedures, was   40 minutes.    Hospital Course   Brief Summary of events leading to rapid response:     # Afib with RVR, new onset  # DARIO, prerenal     Jenn Aparicio is a 69 y.o. F admitted 8/148/24 with failure to thrive and musculoskeletal pain. RRT called evening of 8/16 /24 for afib with RVR. HRs 160-200, /66 (73) and on 2L O2 (basline). ROS positive for dizziness, palpitations, chest discomfort, ongoing back pain, and feelings of urinary retention. On exam she has expiratory wheezes but non-labored WOB, diaphoresis notable for the forehead, and a distended abd. Labs notable for new DARIO Cr 1.18, Na+ 132, phos 1.9, and trop 18,     She was given metoprolol 5 mg IV with modest improvement in HR to ~130-160, she was then amio loaded and started on an infusion. BP intermittently hypotensive, 0.5L fluid  given. Andrade placed returning concentrated micki urine. Electrolytes repleted (K+, Mg2+, & phos). Losartan held given new DARIO + hypotension, lyrica dose decreased to reflect current renal function. She was transferred to ICU given ongoing Afib with RVR and borderline BP requiring low-dose vasopressors. Please refer to ICU H&P for further details.     Physical Exam   Vital Signs: Temp: 98.8  F (37.1  C) Temp src: Oral BP: 113/59 Pulse: (!) 154   Resp: 18 SpO2: 97 % O2 Device: Nasal cannula Oxygen Delivery: 2 LPM  Weight: 204 lbs 12.92 oz      Exam:       Significant Results and Procedures       Sepsis Evaluation   The patient is not known to have an infection.    NO EVIDENCE OF SEPSIS at this time.  Vital sign, physical exam, and lab findings are due to afib with RVR.

## 2024-08-18 ENCOUNTER — APPOINTMENT (OUTPATIENT)
Dept: PHYSICAL THERAPY | Facility: CLINIC | Age: 69
DRG: 551 | End: 2024-08-18
Payer: COMMERCIAL

## 2024-08-18 LAB
ANION GAP SERPL CALCULATED.3IONS-SCNC: 10 MMOL/L (ref 7–15)
BUN SERPL-MCNC: 11.9 MG/DL (ref 8–23)
CALCIUM SERPL-MCNC: 8.5 MG/DL (ref 8.8–10.4)
CHLORIDE SERPL-SCNC: 103 MMOL/L (ref 98–107)
CREAT SERPL-MCNC: 0.47 MG/DL (ref 0.51–0.95)
EGFRCR SERPLBLD CKD-EPI 2021: >90 ML/MIN/1.73M2
ERYTHROCYTE [DISTWIDTH] IN BLOOD BY AUTOMATED COUNT: 12.9 % (ref 10–15)
ERYTHROCYTE [DISTWIDTH] IN BLOOD BY AUTOMATED COUNT: 12.9 % (ref 10–15)
GLUCOSE BLDC GLUCOMTR-MCNC: 115 MG/DL (ref 70–99)
GLUCOSE BLDC GLUCOMTR-MCNC: 119 MG/DL (ref 70–99)
GLUCOSE BLDC GLUCOMTR-MCNC: 138 MG/DL (ref 70–99)
GLUCOSE BLDC GLUCOMTR-MCNC: 162 MG/DL (ref 70–99)
GLUCOSE SERPL-MCNC: 149 MG/DL (ref 70–99)
HCO3 SERPL-SCNC: 23 MMOL/L (ref 22–29)
HCT VFR BLD AUTO: 33.1 % (ref 35–47)
HCT VFR BLD AUTO: 34 % (ref 35–47)
HGB BLD-MCNC: 10.4 G/DL (ref 11.7–15.7)
HGB BLD-MCNC: 10.9 G/DL (ref 11.7–15.7)
MAGNESIUM SERPL-MCNC: 2 MG/DL (ref 1.7–2.3)
MCH RBC QN AUTO: 28 PG (ref 26.5–33)
MCH RBC QN AUTO: 28.2 PG (ref 26.5–33)
MCHC RBC AUTO-ENTMCNC: 31.4 G/DL (ref 31.5–36.5)
MCHC RBC AUTO-ENTMCNC: 32.1 G/DL (ref 31.5–36.5)
MCV RBC AUTO: 88 FL (ref 78–100)
MCV RBC AUTO: 89 FL (ref 78–100)
PHOSPHATE SERPL-MCNC: 2.3 MG/DL (ref 2.5–4.5)
PLATELET # BLD AUTO: 213 10E3/UL (ref 150–450)
PLATELET # BLD AUTO: 226 10E3/UL (ref 150–450)
POTASSIUM SERPL-SCNC: 4.1 MMOL/L (ref 3.4–5.3)
RBC # BLD AUTO: 3.72 10E6/UL (ref 3.8–5.2)
RBC # BLD AUTO: 3.86 10E6/UL (ref 3.8–5.2)
SODIUM SERPL-SCNC: 136 MMOL/L (ref 135–145)
UFH PPP CHRO-ACNC: 0.51 IU/ML
WBC # BLD AUTO: 8.3 10E3/UL (ref 4–11)
WBC # BLD AUTO: 8.8 10E3/UL (ref 4–11)

## 2024-08-18 PROCEDURE — 250N000013 HC RX MED GY IP 250 OP 250 PS 637: Performed by: HOSPITALIST

## 2024-08-18 PROCEDURE — 250N000013 HC RX MED GY IP 250 OP 250 PS 637: Performed by: NURSE PRACTITIONER

## 2024-08-18 PROCEDURE — 250N000011 HC RX IP 250 OP 636: Performed by: HOSPITALIST

## 2024-08-18 PROCEDURE — 80048 BASIC METABOLIC PNL TOTAL CA: CPT | Performed by: HOSPITALIST

## 2024-08-18 PROCEDURE — 85027 COMPLETE CBC AUTOMATED: CPT

## 2024-08-18 PROCEDURE — 94640 AIRWAY INHALATION TREATMENT: CPT

## 2024-08-18 PROCEDURE — 250N000013 HC RX MED GY IP 250 OP 250 PS 637: Performed by: PHYSICIAN ASSISTANT

## 2024-08-18 PROCEDURE — 99233 SBSQ HOSP IP/OBS HIGH 50: CPT

## 2024-08-18 PROCEDURE — 84100 ASSAY OF PHOSPHORUS: CPT | Performed by: NURSE PRACTITIONER

## 2024-08-18 PROCEDURE — 85520 HEPARIN ASSAY: CPT

## 2024-08-18 PROCEDURE — 99418 PROLNG IP/OBS E/M EA 15 MIN: CPT

## 2024-08-18 PROCEDURE — 83735 ASSAY OF MAGNESIUM: CPT | Performed by: NURSE PRACTITIONER

## 2024-08-18 PROCEDURE — 99231 SBSQ HOSP IP/OBS SF/LOW 25: CPT | Performed by: STUDENT IN AN ORGANIZED HEALTH CARE EDUCATION/TRAINING PROGRAM

## 2024-08-18 PROCEDURE — 36415 COLL VENOUS BLD VENIPUNCTURE: CPT

## 2024-08-18 PROCEDURE — 97530 THERAPEUTIC ACTIVITIES: CPT | Mod: GP

## 2024-08-18 PROCEDURE — 250N000013 HC RX MED GY IP 250 OP 250 PS 637

## 2024-08-18 PROCEDURE — 258N000003 HC RX IP 258 OP 636: Performed by: NURSE PRACTITIONER

## 2024-08-18 PROCEDURE — 250N000011 HC RX IP 250 OP 636

## 2024-08-18 PROCEDURE — 999N000157 HC STATISTIC RCP TIME EA 10 MIN

## 2024-08-18 PROCEDURE — 94640 AIRWAY INHALATION TREATMENT: CPT | Mod: 76

## 2024-08-18 PROCEDURE — 250N000011 HC RX IP 250 OP 636: Performed by: STUDENT IN AN ORGANIZED HEALTH CARE EDUCATION/TRAINING PROGRAM

## 2024-08-18 PROCEDURE — 250N000011 HC RX IP 250 OP 636: Performed by: NURSE PRACTITIONER

## 2024-08-18 PROCEDURE — 85027 COMPLETE CBC AUTOMATED: CPT | Performed by: HOSPITALIST

## 2024-08-18 PROCEDURE — 250N000009 HC RX 250: Performed by: NURSE PRACTITIONER

## 2024-08-18 PROCEDURE — 120N000002 HC R&B MED SURG/OB UMMC

## 2024-08-18 PROCEDURE — 36415 COLL VENOUS BLD VENIPUNCTURE: CPT | Performed by: HOSPITALIST

## 2024-08-18 PROCEDURE — 250N000009 HC RX 250

## 2024-08-18 RX ORDER — HEPARIN SODIUM 10000 [USP'U]/100ML
0-5000 INJECTION, SOLUTION INTRAVENOUS CONTINUOUS
Status: DISCONTINUED | OUTPATIENT
Start: 2024-08-18 | End: 2024-08-19

## 2024-08-18 RX ORDER — PREGABALIN 100 MG/1
200 CAPSULE ORAL 2 TIMES DAILY
Status: DISCONTINUED | OUTPATIENT
Start: 2024-08-18 | End: 2024-08-26 | Stop reason: HOSPADM

## 2024-08-18 RX ORDER — DILTIAZEM HYDROCHLORIDE 30 MG/1
30 TABLET, FILM COATED ORAL EVERY 6 HOURS SCHEDULED
Status: DISCONTINUED | OUTPATIENT
Start: 2024-08-18 | End: 2024-08-19

## 2024-08-18 RX ORDER — METOPROLOL TARTRATE 25 MG/1
25 TABLET, FILM COATED ORAL 2 TIMES DAILY
Status: DISCONTINUED | OUTPATIENT
Start: 2024-08-18 | End: 2024-08-18

## 2024-08-18 RX ADMIN — ACETAMINOPHEN 975 MG: 325 TABLET, FILM COATED ORAL at 20:10

## 2024-08-18 RX ADMIN — OMEPRAZOLE 20 MG: 20 CAPSULE, DELAYED RELEASE ORAL at 09:33

## 2024-08-18 RX ADMIN — POLYETHYLENE GLYCOL 400 AND PROPYLENE GLYCOL 1 DROP: 4; 3 SOLUTION/ DROPS OPHTHALMIC at 09:44

## 2024-08-18 RX ADMIN — LEVALBUTEROL HYDROCHLORIDE 1.25 MG: 1.25 SOLUTION RESPIRATORY (INHALATION) at 09:06

## 2024-08-18 RX ADMIN — DULOXETINE HYDROCHLORIDE 30 MG: 30 CAPSULE, DELAYED RELEASE ORAL at 09:33

## 2024-08-18 RX ADMIN — HEPARIN SODIUM 5000 UNITS: 5000 INJECTION, SOLUTION INTRAVENOUS; SUBCUTANEOUS at 17:10

## 2024-08-18 RX ADMIN — POLYETHYLENE GLYCOL 400 AND PROPYLENE GLYCOL 1 DROP: 4; 3 SOLUTION/ DROPS OPHTHALMIC at 20:12

## 2024-08-18 RX ADMIN — OXYCODONE HYDROCHLORIDE 10 MG: 10 TABLET ORAL at 15:18

## 2024-08-18 RX ADMIN — HEPARIN SODIUM 5000 UNITS: 5000 INJECTION, SOLUTION INTRAVENOUS; SUBCUTANEOUS at 09:34

## 2024-08-18 RX ADMIN — METOPROLOL TARTRATE 5 MG: 5 INJECTION, SOLUTION INTRAVENOUS at 02:58

## 2024-08-18 RX ADMIN — HYDROMORPHONE HYDROCHLORIDE 0.3 MG: 1 INJECTION, SOLUTION INTRAMUSCULAR; INTRAVENOUS; SUBCUTANEOUS at 00:01

## 2024-08-18 RX ADMIN — METOPROLOL TARTRATE 25 MG: 25 TABLET, FILM COATED ORAL at 11:21

## 2024-08-18 RX ADMIN — FLUTICASONE PROPIONATE 1 SPRAY: 50 SPRAY, METERED NASAL at 20:11

## 2024-08-18 RX ADMIN — POLYETHYLENE GLYCOL 400 AND PROPYLENE GLYCOL 1 DROP: 4; 3 SOLUTION/ DROPS OPHTHALMIC at 17:01

## 2024-08-18 RX ADMIN — LIDOCAINE 2 PATCH: 4 PATCH TOPICAL at 20:12

## 2024-08-18 RX ADMIN — SODIUM CHLORIDE 0.5 MG/MIN: 9 INJECTION, SOLUTION INTRAVENOUS at 02:36

## 2024-08-18 RX ADMIN — CARBOXYMETHYLCELLULOSE SODIUM 1 DROP: 10 GEL OPHTHALMIC at 12:30

## 2024-08-18 RX ADMIN — HYDROMORPHONE HYDROCHLORIDE 0.3 MG: 1 INJECTION, SOLUTION INTRAMUSCULAR; INTRAVENOUS; SUBCUTANEOUS at 20:26

## 2024-08-18 RX ADMIN — HEPARIN SODIUM 1100 UNITS/HR: 10000 INJECTION, SOLUTION INTRAVENOUS at 18:43

## 2024-08-18 RX ADMIN — PREGABALIN 100 MG: 100 CAPSULE ORAL at 09:33

## 2024-08-18 RX ADMIN — ACETAMINOPHEN 975 MG: 325 TABLET, FILM COATED ORAL at 02:36

## 2024-08-18 RX ADMIN — UMECLIDINIUM 1 PUFF: 62.5 AEROSOL, POWDER ORAL at 11:20

## 2024-08-18 RX ADMIN — CARBOXYMETHYLCELLULOSE SODIUM 1 DROP: 10 GEL OPHTHALMIC at 09:33

## 2024-08-18 RX ADMIN — CARBOXYMETHYLCELLULOSE SODIUM 1 DROP: 10 GEL OPHTHALMIC at 17:02

## 2024-08-18 RX ADMIN — CYANOCOBALAMIN TAB 500 MCG 500 MCG: 500 TAB at 09:34

## 2024-08-18 RX ADMIN — SENNOSIDES AND DOCUSATE SODIUM 1 TABLET: 50; 8.6 TABLET ORAL at 09:46

## 2024-08-18 RX ADMIN — PREGABALIN 200 MG: 100 CAPSULE ORAL at 20:11

## 2024-08-18 RX ADMIN — ATORVASTATIN CALCIUM 10 MG: 10 TABLET, FILM COATED ORAL at 09:33

## 2024-08-18 RX ADMIN — ASPIRIN 81 MG: 81 TABLET, COATED ORAL at 09:33

## 2024-08-18 RX ADMIN — FLUTICASONE PROPIONATE 1 SPRAY: 50 SPRAY, METERED NASAL at 09:44

## 2024-08-18 RX ADMIN — OXYCODONE HYDROCHLORIDE 10 MG: 10 TABLET ORAL at 10:57

## 2024-08-18 RX ADMIN — DULOXETINE HYDROCHLORIDE 30 MG: 30 CAPSULE, DELAYED RELEASE ORAL at 20:11

## 2024-08-18 RX ADMIN — ACETAMINOPHEN 975 MG: 325 TABLET, FILM COATED ORAL at 11:21

## 2024-08-18 RX ADMIN — SENNOSIDES AND DOCUSATE SODIUM 2 TABLET: 50; 8.6 TABLET ORAL at 20:11

## 2024-08-18 RX ADMIN — DILTIAZEM HYDROCHLORIDE 30 MG: 30 TABLET, FILM COATED ORAL at 18:14

## 2024-08-18 RX ADMIN — SODIUM CHLORIDE: 9 INJECTION, SOLUTION INTRAVENOUS at 05:37

## 2024-08-18 RX ADMIN — CETIRIZINE HYDROCHLORIDE 10 MG: 10 TABLET, FILM COATED ORAL at 09:33

## 2024-08-18 RX ADMIN — OXYCODONE HYDROCHLORIDE 10 MG: 10 TABLET ORAL at 06:04

## 2024-08-18 RX ADMIN — CARBOXYMETHYLCELLULOSE SODIUM 1 DROP: 10 GEL OPHTHALMIC at 20:10

## 2024-08-18 RX ADMIN — POLYETHYLENE GLYCOL 3350 17 G: 17 POWDER, FOR SOLUTION ORAL at 09:34

## 2024-08-18 RX ADMIN — METOPROLOL TARTRATE 5 MG: 5 INJECTION, SOLUTION INTRAVENOUS at 09:16

## 2024-08-18 RX ADMIN — FLUTICASONE FUROATE AND VILANTEROL TRIFENATATE 1 PUFF: 200; 25 POWDER RESPIRATORY (INHALATION) at 11:20

## 2024-08-18 RX ADMIN — LEVALBUTEROL HYDROCHLORIDE 1.25 MG: 1.25 SOLUTION RESPIRATORY (INHALATION) at 20:03

## 2024-08-18 RX ADMIN — POLYETHYLENE GLYCOL 400 AND PROPYLENE GLYCOL 1 DROP: 4; 3 SOLUTION/ DROPS OPHTHALMIC at 12:30

## 2024-08-18 ASSESSMENT — ACTIVITIES OF DAILY LIVING (ADL)
ADLS_ACUITY_SCORE: 31

## 2024-08-18 NOTE — PROGRESS NOTES
[facility]    ICU Progress Note       Date of Hospital Admission: 8/14/24  Date of ICU Admission: 8/17/24  Reason for Critical Care Admission: A fib with RVR requiring amio gtt and low-dose vasopressors   Date of Service (when I saw the patient): 08/17/2024    Assessment: Critical Care   Jenn Aparicio is a 69 year old female with PMH notable for O2 dependent COPD (2L O2 @ rest and 3L with exertion), Lung adenocarcinoma s/p RLL lobectomy (2/2016), SBRT to RUL nodule (1/2020) DMII c/b peripheral neuropathy, HTN, and HLD. Initially admitted 8/14/24 to medicine with L ankle pain & swelling accompanied by failure to thrive. Initially planned for discharge to TCU AM of 8/17, but instead transferred to ICU in the early hours of 8/17/24 for treatment of new onset A fib with RVR accompanied by DARIO.     Major Changes Today:  - Increase Pregabalin back to 200 BID  - Initiate metoprolol 25 mg BID  - Initiate Eliquis for atrial fibrillation tomorrow    Plan: Critical Care   Neuro:  # Chronic low back pain  # Acute L ankle pain   # Peripheral neuropathy   History of chronic low back pain and imaging from 6/26/24 showed possible metastatic lesion. Patient reiterates that her back pain has worsened, with radiation down her L LE with weakness and that contributed to her ankle twisting. Also reports she fell over her oxygen tubing and her tank fell on her ankle.  Underwent MRI lumbar spine to rule out worsening metastatic spine disease, myelopathy and showed stable symptoms. D/W Neurosurgery service and recommended outpatient follow up .  - Follow-up with neurosurg outpt   - Continue duloxetine 30 mg BID  - Pregabalin decreased from 200 mg BID to 100 mg BID in light of DARIO - DARIO resolved but will assess need for increase  - Lidocaine patch  - Tylenol 650 mg q4h PRN - schedule 975 mg q 8 hr with goal to reduce narcotic usage  - Flexeril 5 mg 3 times daily as needed  - Hydromorphone 0.3 mg IV  every 4 hours as needed -  minimize use as able  - Oxycodone 5 to 10 mg every 4 hours as needed - minimize use as able    Pulmonary:  # Chronic hypoxic respiratory failure, 2L home O2  # Severe O2 dependent COPD, not currently exacerbated  - Chest x-ray showing fibrosis  - Continue PTA Flonase, cetirizine  - Continue Breo and Incruse daily inhalers  - Xopenex Nebs BID and PRN  - Mucomyst Nebs PRN  - discontinue DuoNebs PRN  - Will hold off on resuming PTA Roflumilast, as this has been reported to precipitate A-fib    Cardiovascular:  # Atrial fibrillation with RVR, new onset  # Hypotension 2/2 above, exacerbated by hypovolemia  # Mild troponemia 2/2 above   # HTN, h/o  # HLD   RRT called evening of 8/16/2024 for A-fib with RVR.  HR  ~ 160-200.  BP initially acceptable, although much lower than her baseline which was ~ 140-180 day prior.  She has never been in A-fib before.  On exam she was warm and well-perfused, however diaphoretic.  Patient reported dizziness, palpitations, and chest discomfort when heart rates severely elevated.  Symptoms have thus resolved after improving heart rates.  Labs notable for new DARIO CR 1.18 and mild troponinemia 18.  Continues to have no signs of systemic infection and lactic acid is normal.  Initially treated with metoprolol 5 mg IV given with modest heart rate improvement.  BP became hypotensive she was bolused with 1L fluid in divided doses and started on amiodarone.   Norepinephrine gtt started for symptomatic HoTN.   8/17 ~0445 converted back to NSR   - EKG showed Afib with RVR at time of RRT, tele now showing NSR  - TTE 8/17: EF > 65%, unremarkable   Left ventricular function is normal.The ejection fraction is > 65%.  Global right ventricular function is normal.  Mild aortic insufficiency.  The inferior vena cava is normal.  No pericardial effusion.  - Optimize electrolytes, K+ > 4.0, Mg2+ > 2.0  - Goal MAP > 65 mmHg               - Fluids: 0.5L bolus x2 (8/16 PM), MIVF at 125  ml/hr\               - metoprolol 5 mg IV x1 (8/16)   - Initiate metoprolol 25 mg BID               - Amiodarone bolus + gtt  (8/16 - present)               - Hold losartan 50 mg every day    GI/Nutrition:  # Risk for malnutrition  # Obesity  # Constipation  # GERD  - RD consult for malnutrition assessment    - Moderate CHO 60 g every day, continue  - Scheduled and PRN bowel regimen   - Continue PTA PPI     Renal/Fluids/Electrolytes:  # DARIO, likely prerenal in the setting of decrease po intake and hypotension - resolved   # Hyponatremia - resolved   # Hypophosphatemia - resolved   # Hypoalbuminemia  # hypocalcemia   Baseline Cr ~ 0.5-0.6. Renal function was normal on hospital arrival. BMP drawn during RRT showing new DARIO Cr 1.18 --> 1.35 on 8/17 early AM, FENA suggesting pre-renal etiology. Pt reporting poor po intake. Does not appear volume overloaded on exam.   - Serial chemistries  - avoid nephrotoxins  - Andrade placed  - UA with glucose and ketones, trace WBC, neg nitrites and leukocyte esterase: not likely UTI     Endocrine:  # DM2   # Obesity   - Sliding scale insulin AC/HS (low)  - Goal BG < 180   - Holding PTA jaridance and mounjaro     ID:  # Elevated CRP   On hospital arrival, WBC 12.2 and  --> now 139. Procal repeatedly negative 0.07 --> 0.12. Leukocytosis resolved. No fevers documented since arrival. Presented with L ankle pain, but had rolled her ankle prior to admission, orthopedics did not feel she has a septic joint. Blood cultures from arrival NGTD. Pulm status stable. UA without signs of infection.  - Monitor WBC trend and fever curve  - Blood cx from admit NGTD, UA bland for infection  - COVID/Flu/RSV + RVP: neg  - CXR without evidence of PNA   - No acute indication for abx      Hematology/oncology:    # NSCLC, adenocarcinoma- RUL,RLL  # S/p RLL lobectomy (2/2016):   Dx 1/2015. STAGE: IA2 [id number] poorly diff adeno RLL, then [id number] IA2 suspected NSCLC RUL, medically inoperable S/p RLL  lobectomy (2/2016) and SBRT to RUL nodule (1/2020). Follows with Dr. Leung with recent clinic visit 7/29/24 w/ plan for 3 months follow up.  - Follow up with Oncology as scheduled  - Initiate eliquis for atrial fibrillation tomorrow    Musculoskeletal:  # L ankle pain with difficulty ambulating   -PT/OT following   - LLE US negative for DVT  - L ankle xray negative for acute fracture or dislocation     Skin:  # No acute concerns     General Cares/Prophylaxis:    DVT Prophylaxis: Pneumatic Compression Devices, HSQ  GI Prophylaxis: PPI  Restraints: Not indicated   Family Communication: will call  daughter Maureen   Code Status: Full     Lines/tubes/drains:  - PIV x2   - Andrade    Disposition:  - Transfer to [facility] [place]    The patient's care was discussed with the Attending Physician, Dr. Spann .    Kirit De La Rosa PA-C  [facility]  Securely message with [employer] (more info)  Text page via [employer] Paging/Directory     ______________________________________________________________    Interval History   Patient did go into atrial fibrillation overnight that converted with IV metoprolol.     Physical Exam   Vital Signs: Temp: 98.5  F (36.9  C) Temp src: Oral BP: 131/75 Pulse: (!) 135   Resp: 22 SpO2: 94 % O2 Device: Nasal cannula Oxygen Delivery: 2 LPM  Weight: 200 lbs 9.9 oz    PHYSICAL EXAM  General: Lying supine in bed, in no acute distress  Skin:  Pale, warm and dry.   Neuro: CNII-XII grossly intact  ENT: PERRL, EOMI, no nystagmus, anicteric  Heart: Regular rate and rhythm, no murmurs, rubs, or gallops appreciated  Lungs: CTA BL, unlabored breathing, on NC  Abdomen: Soft, nondistended, nontender, BS x4  Extremities: Moves to command, 1+ LE pitting edema, capillary refill <3 sec BL upper and lower extremities.   Mental:  Alert and oriented to person, place, and time.    Data   I reviewed all medications, new labs and imaging results over the last 24 hours.  Arterial Blood  Gases   No lab results found in last 7 days.    Complete Blood Count   Recent Labs   Lab 24  0555 24  0205 24  0700 08/15/24  1101   WBC 8.3 10.3 10.7 10.3   HGB 10.4* 12.5 12.8 12.8    238 238 249       Basic Metabolic Panel  Recent Labs   Lab 24  0825 24  0555 24  2123 24  1722 24  1155 24  0427 24  0205 24  0103 24  2321   NA  --  136  --   --  136  --  132*  --  132*   POTASSIUM  --  4.1  --   --  3.9  --  3.6  --  3.5   CHLORIDE  --  103  --   --  102  --  97*  --  97*   CO2  --  23  --   --  22  --  25  --  23   BUN  --  11.9  --   --  15.0  --  15.7  --  13.9   CR  --  0.47*  --   --  0.79  --  1.35*  --  1.18*   * 149* 175* 131* 144*   < > 190*   < > 171*    < > = values in this interval not displayed.       Liver Function Tests  Recent Labs   Lab 24  2321 08/15/24  1101   AST 21  --    ALT 6  --    ALKPHOS 89  --    BILITOTAL 0.6  --    ALBUMIN 3.4*  --    INR  --  1.15       Pancreatic Enzymes  No lab results found in last 7 days.    Coagulation Profile  Recent Labs   Lab 08/15/24  1101   INR 1.15       IMAGING:  Recent Results (from the past 24 hour(s))   Echo Complete   Result Value    LVEF  > 65%    [facility]    [id number]  [id number]  [id number]  [id number]^MARY GRACE^ABI^NOEMI     [facility],[place]  Echocardiography Laboratory  [address] [place] [id number]     Name: JOHN POOLE  MRN: [id number]  : 1955  Study Date: 2024 10:44 AM  Age: 69 yrs  Gender: Female  Patient Location: [facility]  Reason For Study: Shock  Ordering Physician: ABI BRODY  Performed By: Renetta Tejeda RDCS     BSA: 2.0 m2  Height: 66 in  Weight: 200 lb  BP: 121/52 mmHg  ______________________________________________________________________________  Procedure  Echocardiogram with two-dimensional, color and spectral Doppler performed.  Technically difficult  study.  ______________________________________________________________________________  Interpretation Summary  Left ventricular function is normal.The ejection fraction is > 65%.  Global right ventricular function is normal.  Mild aortic insufficiency.  The inferior vena cava is normal.  No pericardial effusion.  ______________________________________________________________________________  Left Ventricle  Left ventricular function is normal.The ejection fraction is > 65%. Left  ventricular wall thickness cannot evaluate. Left ventricular size is normal.  Left ventricular diastolic function is normal. No regional wall motion  abnormalities are seen.     Right Ventricle  The right ventricle is normal size. Global right ventricular function is  normal.     Atria  Both atria appear normal.     Mitral Valve  The mitral valve is normal.     Aortic Valve  The valve leaflets are not well visualized. Mild aortic insufficiency is  present.     Tricuspid Valve  The tricuspid valve is normal. The peak velocity of the tricuspid regurgitant  jet is not obtainable. Pulmonary artery systolic pressure cannot be assessed.     Pulmonic Valve  The valve leaflets are not well visualized. On Doppler interrogation, there is  no significant stenosis or regurgitation.     Vessels  Normal diameter aortic root and proximal ascending aorta. The inferior vena  cava is normal.     Pericardium  No pericardial effusion is present.     Compared to Previous Study  This study was compared with the study from 9/22/22 . No significant changes  noted.  ______________________________________________________________________________  MMode/2D Measurements & Calculations  LVOT diam: 2.1 cm  LVOT area: 3.5 cm2  LA Volume (BP): 30.4 ml  LA Volume Index (BP): 15.2 ml/m2  TAPSE: 2.0 cm     Doppler Measurements & Calculations  MV E max manan: 65.6 cm/sec  MV A max manan: 68.1 cm/sec  MV E/A: 0.96  MV dec slope: 316.0 cm/sec2  MV dec time: 0.21 sec  AI P1/2t:  548.4 msec     E/E' av.4  Lateral E/e': 7.7  Medial E/e': 9.1     ______________________________________________________________________________  Report approved by: Emilio Calvo 2024 01:07 PM

## 2024-08-18 NOTE — PROGRESS NOTES
[facility]    Medicine Progress Note - Hospitalist Service, GOLD TEAM     Date of Admission:  8/14/2024    Assessment & Plan   Jenn Aparicio is a 69 year old female with a history of O2 dependent COPD (2L O2 @ rest and 3L with exertion), Lung adenocarcinoma s/p RLL lobectomy (2/2016), SBRT to RUL nodule (1/2020) DMII c/b peripheral neuropathy, and HTN, HLD, who was initially admitted to [facility] on 8/14/2024 for further evaluation and treatment of acute worsening back pain, left ankle pain and difficulty walking. Patient required admission to the ICU 8/16 due to new onset a fib with RVR and subsequent hypotension requiring pressors. Patient stabilized and appropriate for transition to the floor with medicine as primary on 8/18.     Changes Today:  - Medicine assumed cares as primary  - Start diltiazem 30 mg q8h  - Start heparin drip     A fib with RVR  Hypotension, resolved  Patient developed new onset tachycardia as well as mild hypotension. EKG ordered and confirm a fib. A rapid response was subsequently called and patient received one dose of IV metoprolol with modest improvement in heart rate. Was started on amiodarone drip as well as Norepinephrine due to ongoing a fib and hypotension.  Unclear exact etiology behind a fib, no prior history. Question if due to hypovolemia as patient also noted to have new DARIO, but question if also related to known malignancy. Echo unremarkable. Patient converted back to NSR early AM on 8/17 and was able to be weaned off Norepinephrine late 8/17. Started on oral metoprolol BID and amiodarone discontinued. On interview, patient noted to be intermittently in a fib to 150's. Per bedside nurse, had been flipping in and out throughout the day. Discussed with cardiology who recommended starting diltiazem 30 mg q8h, stopping metoprolol and monitoring response. ANTONINA?DS?-VASc score of 4, thus should be on therapeutic AC. Heparin drip started.  Overall, patient appropriate for transfer back to the floor with medicine as primary.  - Start Diltiazem 30 mg q8h   - Transition to long acting pending response  - Discontinue metoprolol 25 mg BID  - Change heparin from subcutaneous to low intensity drip   - Consider transitioning to Eliquis pending pharmacy liaison consult  - Continue tele for now  - Follow BMP  - Hold losartan  - Cardiology follow-up on discharge (order placed in navigator)    DARIO, resolved  Patient noted to have DARIO on 8/16 after being found to have new onset a fib. Felt likely to be pre-renal in the setting of poor oral intake. Now improved.   - Follow creatinine    Acute on chronic low back pain:    History of chronic low back pain and imaging from 6/26/24 showed possible metastatic lesion. PTA Cymbaljad ramiresrica. Reported increase in symptoms on presentation. MRI of lumbar spine completed and found to evidence of metastatic disease, but did showed multilevel degenerative disease. Case discussed with neurosurgery who recommended outpatient follow-up.  - Pain management  - Continue PTA Medications  - Lidocaine patch PRN  - Tylenol TID scheduled  - Flexeril TID PRN  - Oxycodone 5-10 mg q4h PRN  - Dilaudid 0.3 mg IV q4h PRN  - Follow up with neurosurgery as outpatient     Left ankle pain and swelling  Difficulty ambulating  Presented with ~ 3 days hx of left ankle pain and swelling with inability ambulate.  Xray on admission w/o acute osseus abnormality, though with soft tissue swelling over dorsum of foot and medial ankle. LLE US w/o DVT. Possible sprain vs infection vs other. Ortho service consulted and felt no acute intervention need. No concerns of septic arthritis and conservative management planned.  - Pain management as above  - PT/OT  - Fall precautions     Severe O2 dependent COPD:   Chronic Hypoxic respiratory failure  Baseline O2 requirements of 2L at rest and 3L w/ exertion, currently on 2L.  Home medications include: PRN albuterol,  duoneb, mucomyst, Trelegy ellipta, and Roflumilast.  PFT s 4/21/23. FEV1 0.55 (24%), FVC 1.36 (46 %), DLCOunc 3.68 (17%). Of note, roflumilast held on admission as patient reported taking only PRN and continues to be on hold given it can precipitate A fib.   - oxygen to keep O2 Saturation 90-92%  - Continue PTA medications  ( except Roflumilast)  - Follow up with Pulmonology as scheduled     Poorly controlled DMII  Hgb A1C (8.7- 7/9/24). Home medications include jaridance and weekly tirzepatide.   - HOLD PTA medications  - LSSI TID with meals and qhs  - MOD CHO diet  - BG checks TID with meals and qhs      HTN  Blood pressure mildly elevated  on admission at 150/84.  Home medications include losartan. This medication was held due to hypotension as noted above.   - Continue to hold PTA losartan for now     NSCLC, adenocarcinoma- RUL,RLL  S/p RLL lobectomy (2/2016)   Dx 1/2015. STAGE: IA2 [id number] poorly diff adeno RLL, then [id number] IA2 suspected NSCLC RUL, medically inoperable S/p RLL lobectomy (2/2016) and SBRT to RUL nodule (1/2020). Follows with Dr. Leung with recent clinic visit 7/29/24 w/ plan for 3 months follow up.  - Follow up with Oncology as scheduled    GERD Continue PTA PPI.           Diet: Moderate Consistent Carb (60 g CHO per Meal) Diet  Diet  Snacks/Supplements Adult: Glucerna; With Meals    DVT Prophylaxis: Heparin SQ  Andrade Catheter: PRESENT, indication: ICU only: hourly urine output needed for patient care  Lines: None     Cardiac Monitoring: ACTIVE order. Indication: ICU  Code Status: Full Code      Clinically Significant Risk Factors          # Hypocalcemia: Lowest Ca = 8.5 mg/dL in last 2 days, will monitor and replace as appropriate     # Hypoalbuminemia: Lowest albumin = 3.4 g/dL at 8/16/2024 11:21 PM, will monitor as appropriate              # DMII: A1C = 8.7 % (Ref range: <5.7 %) within past 6 months   # Obesity: Estimated body mass index is 32.38 kg/m  as calculated from the following:    " Height as of this encounter: 1.676 m (5' 6\").    Weight as of this encounter: 91 kg (200 lb 9.9 oz).        # Financial/Environmental Concerns: none               Disposition Plan     Medically Ready for Discharge: Anticipated in 2-4 Days           The patient's care was discussed with the Attending Physician, Dr. Ballard, Bedside Nurse, Patient, cardiology Consultant(s), and ICU Team.    Marycarmen Kinney PA-C  Hospitalist Service, [facility]  Securely message with [employer] (more info)  Text page via [facility] Paging/Directory   See signed in provider for up to date coverage information  ______________________________________________________________________    Interval History   Patient transferring to medicine. States she feels tired and weak. No other acute complaints.     Physical Exam   Vital Signs: Temp: 98.3  F (36.8  C) Temp src: Oral BP: 132/65 Pulse: 108   Resp: 16 SpO2: 97 % O2 Device: Nasal cannula Oxygen Delivery: 2 LPM  Weight: 200 lbs 9.9 oz  General Appearance: Comfortable, nontoxic appearing female seen laying in bed.  Eyes: PERRLA.  No conjunctival icterus.  HEENT: Atraumatic.  Respiratory: Breathing comfortably on supplemental oxygen.  Cardiovascular: Noted to be in and out of a fib on tele. Borderline rates in the 150's to 100's while in NSR.   Lymph/Hematologic: No bruising on exposed skin.  Skin: No lesions or rashes noted on exposed skin.  Musculoskeletal: Moving all extremities spontaneously.  Neurologic: Cranial nerves II through XII grossly intact.  Psychiatric: Mood appropriate.  Medical Decision Making       75 MINUTES SPENT BY ME on the date of service doing chart review, history, exam, documentation & further activities per the note.      Data   Imaging results reviewed over the past 24 hrs:   No results found for this or any previous visit (from the past 24 hour(s)).  Recent Labs   Lab 08/18/24  1629 08/18/24  1202 08/18/24  0825 " 08/18/24  0555 08/17/24  1722 08/17/24  1155 08/17/24  0427 08/17/24  0205 08/17/24  0103 08/16/24  2321 08/16/24  0806 08/16/24  0700 08/15/24  1136 08/15/24  1101   WBC  --   --   --  8.3  --   --   --  10.3  --   --   --  10.7  --  10.3   HGB  --   --   --  10.4*  --   --   --  12.5  --   --   --  12.8  --  12.8   MCV  --   --   --  89  --   --   --  88  --   --   --  87  --  88   PLT  --   --   --  213  --   --   --  238  --   --   --  238  --  249   INR  --   --   --   --   --   --   --   --   --   --   --   --   --  1.15   NA  --   --   --  136  --  136  --  132*  --  132*  --  137  --  139   POTASSIUM  --   --   --  4.1  --  3.9  --  3.6  --  3.5  --  4.1  --  3.6   CHLORIDE  --   --   --  103  --  102  --  97*  --  97*  --  99  --  100   CO2  --   --   --  23  --  22  --  25  --  23  --  21*  --  25   BUN  --   --   --  11.9  --  15.0  --  15.7  --  13.9  --  9.5  --  12.0   CR  --   --   --  0.47*  --  0.79  --  1.35*  --  1.18*  --  0.49*  --  0.50*   ANIONGAP  --   --   --  10  --  12  --  10  --  12  --  17*  --  14   LUIGI  --   --   --  8.5*  --  8.9  --  8.5*  --  8.7*  --  9.1  --  8.6*   * 138* 119* 149*   < > 144*   < > 190*   < > 171*   < > 146*   < > 152*   ALBUMIN  --   --   --   --   --   --   --   --   --  3.4*  --   --   --   --    PROTTOTAL  --   --   --   --   --   --   --   --   --  6.8  --   --   --   --    BILITOTAL  --   --   --   --   --   --   --   --   --  0.6  --   --   --   --    ALKPHOS  --   --   --   --   --   --   --   --   --  89  --   --   --   --    ALT  --   --   --   --   --   --   --   --   --  6  --   --   --   --    AST  --   --   --   --   --   --   --   --   --  21  --   --   --   --     < > = values in this interval not displayed.

## 2024-08-18 NOTE — PLAN OF CARE
ICU End of Shift (0700 to 1930) Summary. See flowsheets for vital signs and detailed assessment.      Major Events this shift: Patient A&Ox4, off levophed as of 1200 today     Pain: Controlled with Scheduled Tylenol, prn oxycodone and Dilaudid.     Lines: 4 PIVs    Drip: Amiod    Plan:          Goal Outcome Evaluation:      Plan of Care Reviewed With: patient    Overall Patient Progress: improvingOverall Patient Progress: improving

## 2024-08-18 NOTE — PROGRESS NOTES
Assessments and vital signs documented. Patient had intermittent episodes of A-Fib to 130-140s; ordered Diltiazem given. Patient stating improvement of left leg pain with repositioning, PRN medications and ice packs. Patient up to chair with physical therapy. Awaiting bed for transfer. Educated patient about plan of care; no questions stated.

## 2024-08-18 NOTE — PLAN OF CARE
Goal Outcome Evaluation:           Problem: Adult Inpatient Plan of Care  Goal: Plan of Care Review  Description: The Plan of Care Review/Shift note should be completed every shift.  The Outcome Evaluation is a brief statement about your assessment that the patient is improving, declining, or no change.  This information will be displayed automatically on your shift  note.  Outcome: Progressing       Major Events this shift: Patient A&Ox4, converted to ST -148, I.v Metoprolol 5mg, pt later converted to SR    Resp: On 2l of oxygen via NC     Pain: Controlled with Scheduled Tylenol, prn Dilaudid.     Skin: no new skin issue noted     Lines: 4 PIVs     Drip: Amiodarone 0.5     Plan: continue plan of care

## 2024-08-19 ENCOUNTER — APPOINTMENT (OUTPATIENT)
Dept: OCCUPATIONAL THERAPY | Facility: CLINIC | Age: 69
DRG: 551 | End: 2024-08-19
Attending: PHYSICIAN ASSISTANT
Payer: COMMERCIAL

## 2024-08-19 ENCOUNTER — APPOINTMENT (OUTPATIENT)
Dept: PHYSICAL THERAPY | Facility: CLINIC | Age: 69
DRG: 551 | End: 2024-08-19
Payer: COMMERCIAL

## 2024-08-19 LAB
ANION GAP SERPL CALCULATED.3IONS-SCNC: 7 MMOL/L (ref 7–15)
BUN SERPL-MCNC: 6 MG/DL (ref 8–23)
CALCIUM SERPL-MCNC: 8.3 MG/DL (ref 8.8–10.4)
CHLORIDE SERPL-SCNC: 102 MMOL/L (ref 98–107)
CREAT SERPL-MCNC: 0.39 MG/DL (ref 0.51–0.95)
EGFRCR SERPLBLD CKD-EPI 2021: >90 ML/MIN/1.73M2
ERYTHROCYTE [DISTWIDTH] IN BLOOD BY AUTOMATED COUNT: 13 % (ref 10–15)
GLUCOSE BLDC GLUCOMTR-MCNC: 114 MG/DL (ref 70–99)
GLUCOSE BLDC GLUCOMTR-MCNC: 141 MG/DL (ref 70–99)
GLUCOSE BLDC GLUCOMTR-MCNC: 153 MG/DL (ref 70–99)
GLUCOSE BLDC GLUCOMTR-MCNC: 158 MG/DL (ref 70–99)
GLUCOSE SERPL-MCNC: 121 MG/DL (ref 70–99)
HCO3 SERPL-SCNC: 28 MMOL/L (ref 22–29)
HCT VFR BLD AUTO: 33.1 % (ref 35–47)
HGB BLD-MCNC: 10.5 G/DL (ref 11.7–15.7)
HOLD SPECIMEN: NORMAL
MCH RBC QN AUTO: 27.7 PG (ref 26.5–33)
MCHC RBC AUTO-ENTMCNC: 31.7 G/DL (ref 31.5–36.5)
MCV RBC AUTO: 87 FL (ref 78–100)
PLATELET # BLD AUTO: 236 10E3/UL (ref 150–450)
POTASSIUM SERPL-SCNC: 3.8 MMOL/L (ref 3.4–5.3)
RBC # BLD AUTO: 3.79 10E6/UL (ref 3.8–5.2)
SODIUM SERPL-SCNC: 137 MMOL/L (ref 135–145)
UFH PPP CHRO-ACNC: 0.42 IU/ML
UFH PPP CHRO-ACNC: 0.43 IU/ML
WBC # BLD AUTO: 8 10E3/UL (ref 4–11)

## 2024-08-19 PROCEDURE — 94640 AIRWAY INHALATION TREATMENT: CPT

## 2024-08-19 PROCEDURE — 94640 AIRWAY INHALATION TREATMENT: CPT | Mod: 76

## 2024-08-19 PROCEDURE — 36415 COLL VENOUS BLD VENIPUNCTURE: CPT | Performed by: STUDENT IN AN ORGANIZED HEALTH CARE EDUCATION/TRAINING PROGRAM

## 2024-08-19 PROCEDURE — 250N000013 HC RX MED GY IP 250 OP 250 PS 637

## 2024-08-19 PROCEDURE — 999N000157 HC STATISTIC RCP TIME EA 10 MIN

## 2024-08-19 PROCEDURE — 36415 COLL VENOUS BLD VENIPUNCTURE: CPT | Performed by: HOSPITALIST

## 2024-08-19 PROCEDURE — 97530 THERAPEUTIC ACTIVITIES: CPT | Mod: GP

## 2024-08-19 PROCEDURE — 250N000012 HC RX MED GY IP 250 OP 636 PS 637: Performed by: PHYSICIAN ASSISTANT

## 2024-08-19 PROCEDURE — 85027 COMPLETE CBC AUTOMATED: CPT | Performed by: HOSPITALIST

## 2024-08-19 PROCEDURE — 97535 SELF CARE MNGMENT TRAINING: CPT | Mod: GO

## 2024-08-19 PROCEDURE — 250N000009 HC RX 250

## 2024-08-19 PROCEDURE — 250N000013 HC RX MED GY IP 250 OP 250 PS 637: Performed by: NURSE PRACTITIONER

## 2024-08-19 PROCEDURE — 250N000013 HC RX MED GY IP 250 OP 250 PS 637: Performed by: HOSPITALIST

## 2024-08-19 PROCEDURE — 85520 HEPARIN ASSAY: CPT | Performed by: STUDENT IN AN ORGANIZED HEALTH CARE EDUCATION/TRAINING PROGRAM

## 2024-08-19 PROCEDURE — 250N000011 HC RX IP 250 OP 636: Performed by: HOSPITALIST

## 2024-08-19 PROCEDURE — 80048 BASIC METABOLIC PNL TOTAL CA: CPT | Performed by: HOSPITALIST

## 2024-08-19 PROCEDURE — 97166 OT EVAL MOD COMPLEX 45 MIN: CPT | Mod: GO

## 2024-08-19 PROCEDURE — 99233 SBSQ HOSP IP/OBS HIGH 50: CPT | Performed by: HOSPITALIST

## 2024-08-19 PROCEDURE — 250N000013 HC RX MED GY IP 250 OP 250 PS 637: Performed by: PHYSICIAN ASSISTANT

## 2024-08-19 PROCEDURE — 120N000002 HC R&B MED SURG/OB UMMC

## 2024-08-19 RX ORDER — HEPARIN SODIUM 10000 [USP'U]/100ML
0-5000 INJECTION, SOLUTION INTRAVENOUS CONTINUOUS
Status: DISCONTINUED | OUTPATIENT
Start: 2024-08-19 | End: 2024-08-19

## 2024-08-19 RX ORDER — DILTIAZEM HYDROCHLORIDE 120 MG/1
120 CAPSULE, COATED, EXTENDED RELEASE ORAL DAILY
DISCHARGE
Start: 2024-08-20 | End: 2024-08-26

## 2024-08-19 RX ORDER — ACETAMINOPHEN 325 MG/1
975 TABLET ORAL EVERY 6 HOURS PRN
Status: DISCONTINUED | OUTPATIENT
Start: 2024-08-19 | End: 2024-08-25

## 2024-08-19 RX ORDER — DILTIAZEM HYDROCHLORIDE 30 MG/1
30 TABLET, FILM COATED ORAL EVERY 6 HOURS SCHEDULED
Status: COMPLETED | OUTPATIENT
Start: 2024-08-19 | End: 2024-08-19

## 2024-08-19 RX ORDER — DILTIAZEM HYDROCHLORIDE 120 MG/1
120 CAPSULE, COATED, EXTENDED RELEASE ORAL DAILY
Status: DISCONTINUED | OUTPATIENT
Start: 2024-08-20 | End: 2024-08-21

## 2024-08-19 RX ORDER — POTASSIUM CHLORIDE 1500 MG/1
40 TABLET, EXTENDED RELEASE ORAL ONCE
Status: COMPLETED | OUTPATIENT
Start: 2024-08-19 | End: 2024-08-19

## 2024-08-19 RX ORDER — POLYETHYLENE GLYCOL 3350 17 G/17G
17 POWDER, FOR SOLUTION ORAL DAILY
DISCHARGE
Start: 2024-08-19

## 2024-08-19 RX ADMIN — HYDROMORPHONE HYDROCHLORIDE 0.3 MG: 1 INJECTION, SOLUTION INTRAMUSCULAR; INTRAVENOUS; SUBCUTANEOUS at 21:10

## 2024-08-19 RX ADMIN — LEVALBUTEROL HYDROCHLORIDE 1.25 MG: 1.25 SOLUTION RESPIRATORY (INHALATION) at 08:47

## 2024-08-19 RX ADMIN — ACETAMINOPHEN 975 MG: 325 TABLET, FILM COATED ORAL at 02:56

## 2024-08-19 RX ADMIN — FLUTICASONE FUROATE AND VILANTEROL TRIFENATATE 1 PUFF: 200; 25 POWDER RESPIRATORY (INHALATION) at 07:46

## 2024-08-19 RX ADMIN — OXYCODONE HYDROCHLORIDE 10 MG: 10 TABLET ORAL at 13:55

## 2024-08-19 RX ADMIN — CETIRIZINE HYDROCHLORIDE 10 MG: 10 TABLET, FILM COATED ORAL at 07:48

## 2024-08-19 RX ADMIN — INSULIN ASPART 1 UNITS: 100 INJECTION, SOLUTION INTRAVENOUS; SUBCUTANEOUS at 18:30

## 2024-08-19 RX ADMIN — HYDROMORPHONE HYDROCHLORIDE 0.3 MG: 1 INJECTION, SOLUTION INTRAMUSCULAR; INTRAVENOUS; SUBCUTANEOUS at 16:25

## 2024-08-19 RX ADMIN — HYDROMORPHONE HYDROCHLORIDE 0.3 MG: 1 INJECTION, SOLUTION INTRAMUSCULAR; INTRAVENOUS; SUBCUTANEOUS at 04:18

## 2024-08-19 RX ADMIN — ACETAMINOPHEN 975 MG: 325 TABLET, FILM COATED ORAL at 11:41

## 2024-08-19 RX ADMIN — PREGABALIN 200 MG: 100 CAPSULE ORAL at 20:48

## 2024-08-19 RX ADMIN — OXYCODONE HYDROCHLORIDE 10 MG: 10 TABLET ORAL at 00:24

## 2024-08-19 RX ADMIN — INSULIN ASPART 1 UNITS: 100 INJECTION, SOLUTION INTRAVENOUS; SUBCUTANEOUS at 12:30

## 2024-08-19 RX ADMIN — CARBOXYMETHYLCELLULOSE SODIUM 1 DROP: 10 GEL OPHTHALMIC at 07:52

## 2024-08-19 RX ADMIN — OXYCODONE HYDROCHLORIDE 10 MG: 10 TABLET ORAL at 19:35

## 2024-08-19 RX ADMIN — CARBOXYMETHYLCELLULOSE SODIUM 1 DROP: 10 GEL OPHTHALMIC at 16:25

## 2024-08-19 RX ADMIN — ASPIRIN 81 MG: 81 TABLET, COATED ORAL at 07:48

## 2024-08-19 RX ADMIN — POTASSIUM CHLORIDE 40 MEQ: 1500 TABLET, EXTENDED RELEASE ORAL at 09:07

## 2024-08-19 RX ADMIN — DILTIAZEM HYDROCHLORIDE 30 MG: 30 TABLET, FILM COATED ORAL at 11:41

## 2024-08-19 RX ADMIN — CARBOXYMETHYLCELLULOSE SODIUM 1 DROP: 10 GEL OPHTHALMIC at 11:41

## 2024-08-19 RX ADMIN — FLUTICASONE PROPIONATE 1 SPRAY: 50 SPRAY, METERED NASAL at 07:46

## 2024-08-19 RX ADMIN — POLYETHYLENE GLYCOL 400 AND PROPYLENE GLYCOL 1 DROP: 4; 3 SOLUTION/ DROPS OPHTHALMIC at 07:47

## 2024-08-19 RX ADMIN — POLYETHYLENE GLYCOL 400 AND PROPYLENE GLYCOL 1 DROP: 4; 3 SOLUTION/ DROPS OPHTHALMIC at 16:27

## 2024-08-19 RX ADMIN — DULOXETINE HYDROCHLORIDE 30 MG: 30 CAPSULE, DELAYED RELEASE ORAL at 20:48

## 2024-08-19 RX ADMIN — POLYETHYLENE GLYCOL 400 AND PROPYLENE GLYCOL 1 DROP: 4; 3 SOLUTION/ DROPS OPHTHALMIC at 11:42

## 2024-08-19 RX ADMIN — DILTIAZEM HYDROCHLORIDE 30 MG: 30 TABLET, FILM COATED ORAL at 00:20

## 2024-08-19 RX ADMIN — SENNOSIDES AND DOCUSATE SODIUM 2 TABLET: 50; 8.6 TABLET ORAL at 20:49

## 2024-08-19 RX ADMIN — DILTIAZEM HYDROCHLORIDE 30 MG: 30 TABLET, FILM COATED ORAL at 17:47

## 2024-08-19 RX ADMIN — CYANOCOBALAMIN TAB 500 MCG 500 MCG: 500 TAB at 07:48

## 2024-08-19 RX ADMIN — UMECLIDINIUM 1 PUFF: 62.5 AEROSOL, POWDER ORAL at 07:52

## 2024-08-19 RX ADMIN — POLYETHYLENE GLYCOL 3350 17 G: 17 POWDER, FOR SOLUTION ORAL at 07:48

## 2024-08-19 RX ADMIN — ATORVASTATIN CALCIUM 10 MG: 10 TABLET, FILM COATED ORAL at 07:48

## 2024-08-19 RX ADMIN — LIDOCAINE 2 PATCH: 4 PATCH TOPICAL at 20:49

## 2024-08-19 RX ADMIN — APIXABAN 5 MG: 5 TABLET, FILM COATED ORAL at 20:49

## 2024-08-19 RX ADMIN — OMEPRAZOLE 20 MG: 20 CAPSULE, DELAYED RELEASE ORAL at 07:48

## 2024-08-19 RX ADMIN — OXYCODONE HYDROCHLORIDE 10 MG: 10 TABLET ORAL at 09:06

## 2024-08-19 RX ADMIN — FLUTICASONE PROPIONATE 1 SPRAY: 50 SPRAY, METERED NASAL at 20:51

## 2024-08-19 RX ADMIN — LEVALBUTEROL HYDROCHLORIDE 1.25 MG: 1.25 SOLUTION RESPIRATORY (INHALATION) at 20:12

## 2024-08-19 RX ADMIN — PREGABALIN 200 MG: 100 CAPSULE ORAL at 07:48

## 2024-08-19 RX ADMIN — CARBOXYMETHYLCELLULOSE SODIUM 1 DROP: 10 GEL OPHTHALMIC at 20:49

## 2024-08-19 RX ADMIN — DULOXETINE HYDROCHLORIDE 30 MG: 30 CAPSULE, DELAYED RELEASE ORAL at 07:48

## 2024-08-19 RX ADMIN — POLYETHYLENE GLYCOL 400 AND PROPYLENE GLYCOL 1 DROP: 4; 3 SOLUTION/ DROPS OPHTHALMIC at 20:51

## 2024-08-19 RX ADMIN — DILTIAZEM HYDROCHLORIDE 30 MG: 30 TABLET, FILM COATED ORAL at 06:16

## 2024-08-19 RX ADMIN — HYDROMORPHONE HYDROCHLORIDE 0.3 MG: 1 INJECTION, SOLUTION INTRAMUSCULAR; INTRAVENOUS; SUBCUTANEOUS at 11:41

## 2024-08-19 RX ADMIN — SENNOSIDES AND DOCUSATE SODIUM 2 TABLET: 50; 8.6 TABLET ORAL at 07:48

## 2024-08-19 ASSESSMENT — ACTIVITIES OF DAILY LIVING (ADL)
ADLS_ACUITY_SCORE: 31
PREVIOUS_RESPONSIBILITIES: MEAL PREP;HOUSEKEEPING;LAUNDRY;MEDICATION MANAGEMENT;FINANCES
ADLS_ACUITY_SCORE: 31

## 2024-08-19 NOTE — PROGRESS NOTES
Care Management Follow Up    Length of Stay (days): 5    Expected Discharge Date:       Concerns to be Addressed: discharge planning     Patient plan of care discussed at interdisciplinary rounds: Yes    Anticipated Discharge Disposition: Transitional Care     Anticipated Discharge Services:  (Post Acute Therapies)  Anticipated Discharge DME:  (SNF to provide all necessary DME)    Patient/family educated on Medicare website which has current facility and service quality ratings: yes  Education Provided on the Discharge Plan: Yes  Patient/Family in Agreement with the Plan: yes    Referrals Placed by CM/SW: Post Acute Facilities  Private pay costs discussed: Not applicable    Additional Information:  Pt remains on the ICU unit under Med surge status. Pt is not ready for discharge at this time. SW/RNCC will continue to follow for safe discharge.     Kenneth Fink, [facility]  [facility] & [facility]   Ph: [phone]

## 2024-08-19 NOTE — CONSULTS
Consulted to run a test claim for Eliquis (apixaban) & Xarelto (rivaroxaban).    Patient has pharmacy benefits through [employer]. Per insurance, the following are covered and preferred under the patient's plans:     Eliquis tabs - $0  Xarelto tabs - $0       Please feel free to contact me with any other test claims, prior authorizations, or insurance questions regarding outpatient medications.     Thanks!      Whitney Person [facility]  Discharge Pharmacy Liaison  [facility]  Pronouns: She/Her/Hers    Securely message with [employer], [employer] Secure Chat, or [employer]  Phone: [phone]  Fax: [phone]  [email or web address]

## 2024-08-19 NOTE — PROGRESS NOTES
[facility]    Medicine Progress Note - Hospitalist Service, GOLD TEAM 22    Date of Admission:  8/14/2024    Assessment & Plan   Jenn Aparicio is a 69 year old female with a history of O2 dependent COPD (2L O2 @ rest and 3L with exertion), Lung adenocarcinoma s/p RLL lobectomy (2/2016), SBRT to RUL nodule (1/2020) DMII c/b peripheral neuropathy, and HTN, HLD, who was initially admitted to [facility] on 8/14/2024 for further evaluation and treatment of acute worsening back pain, left ankle pain and difficulty walking. Patient required admission to the ICU 8/16 due to new onset a fib with RVR and subsequent hypotension requiring pressors. Patient stabilized and appropriate for transition to the floor with medicine as primary on 8/18.     Changes Today:  - Medicine assumed cares as primary  - Start diltiazem 30 mg q8h  - Start heparin drip     A fib with RVR  Hypotension, resolved  Patient developed new onset tachycardia as well as mild hypotension. EKG ordered and confirm a fib. A rapid response was subsequently called and patient received one dose of IV metoprolol with modest improvement in heart rate. Was started on amiodarone drip as well as Norepinephrine due to ongoing a fib and hypotension.  Unclear exact etiology behind a fib, no prior history. Question if due to hypovolemia as patient also noted to have new DARIO, but question if also related to known malignancy. Echo unremarkable. Patient converted back to NSR early AM on 8/17 and was able to be weaned off Norepinephrine late 8/17. Started on oral metoprolol BID and amiodarone discontinued. Due to persistence, switched  to Diltiazem 30 mg q6h and switching to long acting - 120 mg Q daily (8/19) with overlap  -  ANTONINA?DS?-VASc score of 4, and started on heparin low intensity drip to continue and transitioning to Eliquis pending pharmacy liaison consult  - Continue tele for now, follow BMP, magnesium levels and target potassium  >4, magnesium >2. Currently in sinus on tele  - Hold losartan  - Cardiology follow-up on discharge (order placed in navigator)    DARIO, resolved  Patient noted to have DARIO on 8/16 after being found to have new onset a fib. Felt likely to be pre-renal in the setting of poor oral intake.   - resolved with IVF    Acute on chronic low back pain:    History of chronic low back pain and imaging from 6/26/24 showed possible metastatic lesion. PTA Cymbalta, lyrica. Reported increase in symptoms on presentation. MRI of lumbar spine completed and found to evidence of metastatic disease, but did showed multilevel degenerative disease. Case discussed with neurosurgery who recommended outpatient follow-up.  - Pain management  - Continue PTA Medications  - Lidocaine patch PRN  - Tylenol TID scheduled  - Flexeril TID PRN  - Oxycodone 5-10 mg q4h PRN  - Dilaudid 0.3 mg IV q4h PRN  - Follow up with neurosurgery as outpatient     Left ankle pain and swelling  Difficulty ambulating  Presented with ~ 3 days hx of left ankle pain and swelling with inability ambulate.  Xray on admission w/o acute osseus abnormality, though with soft tissue swelling over dorsum of foot and medial ankle. LLE US w/o DVT. Possible sprain vs infection vs other. Ortho service consulted and felt no acute intervention need. No concerns of septic arthritis and conservative management planned.  - Pain management as above  - PT/OT  - Fall precautions     Severe O2 dependent COPD:   Chronic Hypoxic respiratory failure  Baseline O2 requirements of 2L at rest and 3L w/ exertion, currently on 2L.  Home medications include: PRN albuterol, duoneb, mucomyst, Trelegy ellipta, and Roflumilast.  PFT s 4/21/23. FEV1 0.55 (24%), FVC 1.36 (46 %), DLCOunc 3.68 (17%). Of note, roflumilast held on admission as patient reported taking only PRN and continues to be on hold given it can precipitate A fib.   - oxygen to keep O2 Saturation 90-92%  - Continue PTA medications (except  "Roflumilast)  - Follow up with Pulmonology as scheduled     Poorly controlled DMII  Hgb A1C (8.7- 7/9/24). Home medications include jaridance and weekly tirzepatide.   - HOLD PTA medications  - LSSI TID with meals and qhs  - MOD CHO diet  - BG checks TID with meals and qhs      HTN  Blood pressure mildly elevated  on admission at 150/84.  Home medications include losartan. This medication was held due to hypotension as noted above.   - Continue to hold PTA losartan for now     NSCLC, adenocarcinoma- RUL,RLL  S/p RLL lobectomy (2/2016)   Dx 1/2015. STAGE: IA2 [id number] poorly diff adeno RLL, then [id number] IA2 suspected NSCLC RUL, medically inoperable S/p RLL lobectomy (2/2016) and SBRT to RUL nodule (1/2020). Follows with Dr. Leung with recent clinic visit 7/29/24 w/ plan for 3 months follow up.  - Follow up with Oncology as scheduled    GERD Continue PTA PPI.           Diet: Moderate Consistent Carb (60 g CHO per Meal) Diet  Diet  Snacks/Supplements Adult: Glucerna; With Meals    DVT Prophylaxis: Heparin SQ  Andrade Catheter: PRESENT, indication: ICU only: hourly urine output needed for patient care  Lines: None     Cardiac Monitoring: ACTIVE order. Indication: ICU  Code Status: Full Code      Clinically Significant Risk Factors              # Hypoalbuminemia: Lowest albumin = 3.4 g/dL at 8/16/2024 11:21 PM, will monitor as appropriate              # DMII: A1C = 8.7 % (Ref range: <5.7 %) within past 6 months   # Obesity: Estimated body mass index is 33.52 kg/m  as calculated from the following:    Height as of this encounter: 1.676 m (5' 6\").    Weight as of this encounter: 94.2 kg (207 lb 10.8 oz).        # Financial/Environmental Concerns: none               Disposition Plan     Medically Ready for Discharge: Anticipated in 2-4 Days           The patient's care was discussed with the Attending Physician, Dr. Ballard, Bedside Nurse, Patient, cardiology Consultant(s), and ICU Team.    Freddie Ramírez MD  Hospitalist " Service, GOLD TEAM 22  [facility]  Securely message with [employer] (more info)  Text page via [facility] Paging/Directory   See signed in provider for up to date coverage information  ______________________________________________________________________    Interval History     Reports continued back pain. Denies palpitations, chest pain, shortness of breath    Physical Exam   Vital Signs: Temp: 98.3  F (36.8  C) Temp src: Oral BP: 131/65 Pulse: 84   Resp: 16 SpO2: 97 % O2 Device: Nasal cannula Oxygen Delivery: 2 LPM  Weight: 207 lbs 10.77 oz  General Appearance: Comfortable, nontoxic appearing female seen laying in bed.  Eyes: PERRLA.  No conjunctival icterus.  HEENT: Atraumatic.  Respiratory: Breathing comfortably on supplemental oxygen.  Cardiovascular: normal rate, no murmurs  Lymph/Hematologic: No bruising on exposed skin.  Skin: No lesions or rashes noted on exposed skin.  Musculoskeletal: Moving all extremities spontaneously.  Neurologic: Cranial nerves II through XII grossly intact.  Psychiatric: Mood appropriate.\    Medical Decision Making       55 MINUTES SPENT BY ME on the date of service doing chart review, history, exam, documentation & further activities per the note.      Data   Imaging results reviewed over the past 24 hrs:   No results found for this or any previous visit (from the past 24 hour(s)).  Recent Labs   Lab 08/19/24  0651 08/19/24  0650 08/18/24  2210 08/18/24  1822 08/18/24  0825 08/18/24  0555 08/17/24  1722 08/17/24  1155 08/17/24  0103 08/16/24  2321 08/15/24  1136 08/15/24  1101   WBC 8.0  --   --  8.8  --  8.3  --   --    < >  --    < > 10.3   HGB 10.5*  --   --  10.9*  --  10.4*  --   --    < >  --    < > 12.8   MCV 87  --   --  88  --  89  --   --    < >  --    < > 88     --   --  226  --  213  --   --    < >  --    < > 249   INR  --   --   --   --   --   --   --   --   --   --   --  1.15     --   --   --   --  136  --  136   < >  132*   < > 139   POTASSIUM 3.8  --   --   --   --  4.1  --  3.9   < > 3.5   < > 3.6   CHLORIDE 102  --   --   --   --  103  --  102   < > 97*   < > 100   CO2 28  --   --   --   --  23  --  22   < > 23   < > 25   BUN 6.0*  --   --   --   --  11.9  --  15.0   < > 13.9   < > 12.0   CR 0.39*  --   --   --   --  0.47*  --  0.79   < > 1.18*   < > 0.50*   ANIONGAP 7  --   --   --   --  10  --  12   < > 12   < > 14   LUIGI 8.3*  --   --   --   --  8.5*  --  8.9   < > 8.7*   < > 8.6*   * 114* 162*  --    < > 149*   < > 144*   < > 171*   < > 152*   ALBUMIN  --   --   --   --   --   --   --   --   --  3.4*  --   --    PROTTOTAL  --   --   --   --   --   --   --   --   --  6.8  --   --    BILITOTAL  --   --   --   --   --   --   --   --   --  0.6  --   --    ALKPHOS  --   --   --   --   --   --   --   --   --  89  --   --    ALT  --   --   --   --   --   --   --   --   --  6  --   --    AST  --   --   --   --   --   --   --   --   --  21  --   --     < > = values in this interval not displayed.

## 2024-08-19 NOTE — PROGRESS NOTES
"   08/19/24 1415   Appointment Info   Signing Clinician's Name / Credentials (OT) Amy Terry MS, OTR/L, CLT   Living Environment   People in Home alone   Current Living Arrangements apartment   Home Accessibility no concerns   Transportation Anticipated agency;health plan transportation   Living Environment Comments Pt lives alone in apartment, has no stairs. Pt has tub/shower with a shower chair and grab bars. Pt reports tall toilets. Pt reports her daughter sometimes helps her at home.   Self-Care   Usual Activity Tolerance moderate   Current Activity Tolerance fair   Equipment Currently Used at Home walker, rolling;cane, straight;shower chair;grab bar, tub/shower   Fall history within last six months yes   Number of times patient has fallen within last six months 1   Activity/Exercise/Self-Care Comment ind with ADL   Instrumental Activities of Daily Living (IADL)   Previous Responsibilities meal prep;housekeeping;laundry;medication management;finances   IADL Comments Pt gets some assist from daughter and grandchildren.   General Information   Onset of Illness/Injury or Date of Surgery 08/14/24   Referring Physician Yohana Santo PA-C   Patient/Family Therapy Goal Statement (OT) get stronger   Additional Occupational Profile Info/Pertinent History of Current Problem Per chart: \"Jenn Aparicio is a 69 year old female with a history of O2 dependent COPD (2L O2 @ rest and 3L with exertion), Lung adenocarcinoma s/p RLL lobectomy (2/2016), SBRT to RUL nodule (1/2020) DMII c/b peripheral neuropathy, and HTN, HLD, who was initially admitted to [facility] on 8/14/2024 for further evaluation and treatment of acute worsening back pain, left ankle pain and difficulty walking. Patient required admission to the ICU 8/16 due to new onset a fib with RVR and subsequent hypotension requiring pressors. Patient stabilized and appropriate for transition to the floor with medicine as primary on 8/18.\"   Existing " Precautions/Restrictions fall;oxygen therapy device and L/min   General Observations and Info Activity: early mobility   Cognitive Status Examination   Affect/Mental Status (Cognitive) low arousal/lethargic   Follows Commands 75-90% accuracy   Cognitive Status Comments Pt somewhat lethargic during session   Pain Assessment   Patient Currently in Pain Yes, see Vital Sign flowsheet   Range of Motion Comprehensive   Comment, General Range of Motion BUE WFL   Strength Comprehensive (MMT)   Comment, General Manual Muscle Testing (MMT) Assessment generalized deconditioning   Bed Mobility   Comment (Bed Mobility) Ax2   Transfers   Transfer Comments min A FWW   Balance   Balance Comments CGA FWW   Activities of Daily Living   BADL Assessment/Intervention lower body dressing;toileting;bathing   Bathing Assessment/Intervention   [place] Level (Bathing) moderate assist (50% patient effort)   Lower Body Dressing Assessment/Training   [place] Level (Lower Body Dressing) maximum assist (25% patient effort)   Toileting   [place] Level (Toileting) minimum assist (75% patient effort)   Clinical Impression   Criteria for Skilled Therapeutic Interventions Met (OT) Yes, treatment indicated   OT Diagnosis decreased I with ADL   OT Problem List-Impairments impacting ADL activity tolerance impaired;mobility;strength   Assessment of Occupational Performance 3-5 Performance Deficits   Identified Performance Deficits dressing, bathing, toileting, transfers, IADL   Planned Therapy Interventions (OT) ADL retraining;transfer training   Clinical Decision Making Complexity (OT) detailed assessment/moderate complexity   Risk & Benefits of therapy have been explained evaluation/treatment results reviewed;care plan/treatment goals reviewed;risks/benefits reviewed;current/potential barriers reviewed;participants voiced agreement with care plan;participants included;patient   Clinical Impression Comments Pt presents with impaired  ADL/IADL 2/2 the above. Pt to benefit from skilled OT to maximize safety and I with ADL   OT Total Evaluation Time   OT Eval, Moderate Complexity Minutes (02186) 9   OT Goals   Therapy Frequency (OT) 5 times/week   OT Predicted Duration/Target Date for Goal Attainment 08/30/24   OT Goals Toilet Transfer/Toileting;Lower Body Dressing   OT: Lower Body Dressing Supervision/stand-by assist   OT: Toilet Transfer/Toileting Modified independent   OT Discharge Planning   OT Plan progress standing ADL as able, dressing, monitor cognition   OT Discharge Recommendation (DC Rec) Transitional Care Facility   OT Rationale for DC Rec Pt currently well below baseline requiring Ax1-2 for mobility and ADL at this time. Pt would benefit from continued therapy in TCU setting to maximize safety and I with ADL.   OT Brief overview of current status min A FWW, second person helpful for lines   Total Session Time   Timed Code Treatment Minutes 32   Total Session Time (sum of timed and untimed services) 41

## 2024-08-19 NOTE — PLAN OF CARE
[id number]    Problem: Adult Inpatient Plan of Care  Goal: Plan of Care Review  Description: The Plan of Care Review/Shift note should be completed every shift.  The Outcome Evaluation is a brief statement about your assessment that the patient is improving, declining, or no change.  This information will be displayed automatically on your shift  note.  Outcome: Progressing   Goal Outcome Evaluation:        Major Events this shift: Patient A&Ox4,      Cardiac: SR, bp- wdl    Resp: On 2l of oxygen via NC      Pain: Controlled with Scheduled Tylenol, prn Dilaudid.      Skin: no new skin issue noted     Lines: 4 PIVs     Drip: Heparin 900, anti- xa check at 133.     Plan: continue plan of care

## 2024-08-19 NOTE — PROGRESS NOTES
AO x 4. Chronic pain, alternating between PO oxycodone and IV dilaudid. Pt continues to rate pain as a 10/10. Pt remains on heparin drip, has had x2 consecutive Heparin Xa levels within goal range, next draw is ordered for tomorrow morning. Heparin infusing at 900 units/hour. Pt on 2 LPM via nasal cannula. X2 PIV patent and infusing.

## 2024-08-19 NOTE — PLAN OF CARE
ICU End of Shift Summary:       Neuro: Alert and oriented x 4. Afebrile. PRN Dilaudid given for pain on the lower back and left leg. Cold pack offered.   Pulm/Resp: on nasal cannula at 2 lpm. No shortness of breath  CV: MAP >65. Sinus rhythm to Sinus tachycardia 100's  GI/: with javed catheter, urine output adequate  Access: peripheral IV on left and right lower forearm  Skin: no new skin concern  Gtts: Heparin 900 units/hr  Drains: javed catheter    Plan:     Pain control  Anti Xa next draw tomorrow 8/20 at 6am

## 2024-08-20 ENCOUNTER — APPOINTMENT (OUTPATIENT)
Dept: PHYSICAL THERAPY | Facility: CLINIC | Age: 69
DRG: 551 | End: 2024-08-20
Payer: COMMERCIAL

## 2024-08-20 LAB
ANION GAP SERPL CALCULATED.3IONS-SCNC: 6 MMOL/L (ref 7–15)
BACTERIA BLD CULT: NO GROWTH
BUN SERPL-MCNC: 6.2 MG/DL (ref 8–23)
CALCIUM SERPL-MCNC: 8.7 MG/DL (ref 8.8–10.4)
CHLORIDE SERPL-SCNC: 103 MMOL/L (ref 98–107)
CREAT SERPL-MCNC: 0.52 MG/DL (ref 0.51–0.95)
EGFRCR SERPLBLD CKD-EPI 2021: >90 ML/MIN/1.73M2
ERYTHROCYTE [DISTWIDTH] IN BLOOD BY AUTOMATED COUNT: 13.2 % (ref 10–15)
GLUCOSE BLDC GLUCOMTR-MCNC: 100 MG/DL (ref 70–99)
GLUCOSE BLDC GLUCOMTR-MCNC: 114 MG/DL (ref 70–99)
GLUCOSE BLDC GLUCOMTR-MCNC: 137 MG/DL (ref 70–99)
GLUCOSE BLDC GLUCOMTR-MCNC: 151 MG/DL (ref 70–99)
GLUCOSE SERPL-MCNC: 105 MG/DL (ref 70–99)
HCO3 SERPL-SCNC: 30 MMOL/L (ref 22–29)
HCT VFR BLD AUTO: 33.2 % (ref 35–47)
HGB BLD-MCNC: 10.6 G/DL (ref 11.7–15.7)
MAGNESIUM SERPL-MCNC: 1.7 MG/DL (ref 1.7–2.3)
MCH RBC QN AUTO: 27.9 PG (ref 26.5–33)
MCHC RBC AUTO-ENTMCNC: 31.9 G/DL (ref 31.5–36.5)
MCV RBC AUTO: 87 FL (ref 78–100)
PLATELET # BLD AUTO: 256 10E3/UL (ref 150–450)
POTASSIUM SERPL-SCNC: 4.5 MMOL/L (ref 3.4–5.3)
RBC # BLD AUTO: 3.8 10E6/UL (ref 3.8–5.2)
SODIUM SERPL-SCNC: 139 MMOL/L (ref 135–145)
UFH PPP CHRO-ACNC: >1.1 IU/ML
WBC # BLD AUTO: 7.3 10E3/UL (ref 4–11)

## 2024-08-20 PROCEDURE — 120N000002 HC R&B MED SURG/OB UMMC

## 2024-08-20 PROCEDURE — 97110 THERAPEUTIC EXERCISES: CPT | Mod: GP

## 2024-08-20 PROCEDURE — 99232 SBSQ HOSP IP/OBS MODERATE 35: CPT | Performed by: STUDENT IN AN ORGANIZED HEALTH CARE EDUCATION/TRAINING PROGRAM

## 2024-08-20 PROCEDURE — 83735 ASSAY OF MAGNESIUM: CPT | Performed by: HOSPITALIST

## 2024-08-20 PROCEDURE — 250N000009 HC RX 250

## 2024-08-20 PROCEDURE — 94640 AIRWAY INHALATION TREATMENT: CPT

## 2024-08-20 PROCEDURE — 80048 BASIC METABOLIC PNL TOTAL CA: CPT | Performed by: HOSPITALIST

## 2024-08-20 PROCEDURE — 94640 AIRWAY INHALATION TREATMENT: CPT | Mod: 76

## 2024-08-20 PROCEDURE — 250N000013 HC RX MED GY IP 250 OP 250 PS 637: Performed by: PHYSICIAN ASSISTANT

## 2024-08-20 PROCEDURE — 999N000157 HC STATISTIC RCP TIME EA 10 MIN

## 2024-08-20 PROCEDURE — 85027 COMPLETE CBC AUTOMATED: CPT | Performed by: HOSPITALIST

## 2024-08-20 PROCEDURE — 250N000013 HC RX MED GY IP 250 OP 250 PS 637

## 2024-08-20 PROCEDURE — 250N000013 HC RX MED GY IP 250 OP 250 PS 637: Performed by: HOSPITALIST

## 2024-08-20 PROCEDURE — 250N000011 HC RX IP 250 OP 636: Performed by: HOSPITALIST

## 2024-08-20 PROCEDURE — 36415 COLL VENOUS BLD VENIPUNCTURE: CPT | Performed by: HOSPITALIST

## 2024-08-20 PROCEDURE — 250N000013 HC RX MED GY IP 250 OP 250 PS 637: Performed by: NURSE PRACTITIONER

## 2024-08-20 PROCEDURE — 85520 HEPARIN ASSAY: CPT | Performed by: HOSPITALIST

## 2024-08-20 PROCEDURE — 93010 ELECTROCARDIOGRAM REPORT: CPT | Performed by: INTERNAL MEDICINE

## 2024-08-20 RX ORDER — POLYETHYLENE GLYCOL 3350 17 G/17G
17 POWDER, FOR SOLUTION ORAL 2 TIMES DAILY
Status: DISCONTINUED | OUTPATIENT
Start: 2024-08-20 | End: 2024-08-26

## 2024-08-20 RX ADMIN — POLYETHYLENE GLYCOL 3350 17 G: 17 POWDER, FOR SOLUTION ORAL at 08:53

## 2024-08-20 RX ADMIN — POLYETHYLENE GLYCOL 400 AND PROPYLENE GLYCOL 1 DROP: 4; 3 SOLUTION/ DROPS OPHTHALMIC at 20:26

## 2024-08-20 RX ADMIN — LEVALBUTEROL HYDROCHLORIDE 1.25 MG: 1.25 SOLUTION RESPIRATORY (INHALATION) at 07:54

## 2024-08-20 RX ADMIN — FLUTICASONE PROPIONATE 1 SPRAY: 50 SPRAY, METERED NASAL at 20:26

## 2024-08-20 RX ADMIN — DILTIAZEM HYDROCHLORIDE 120 MG: 120 CAPSULE, COATED, EXTENDED RELEASE ORAL at 08:55

## 2024-08-20 RX ADMIN — CARBOXYMETHYLCELLULOSE SODIUM 1 DROP: 10 GEL OPHTHALMIC at 08:56

## 2024-08-20 RX ADMIN — ASPIRIN 81 MG: 81 TABLET, COATED ORAL at 08:56

## 2024-08-20 RX ADMIN — FLUTICASONE PROPIONATE 1 SPRAY: 50 SPRAY, METERED NASAL at 08:58

## 2024-08-20 RX ADMIN — CETIRIZINE HYDROCHLORIDE 10 MG: 10 TABLET, FILM COATED ORAL at 08:55

## 2024-08-20 RX ADMIN — SENNOSIDES AND DOCUSATE SODIUM 2 TABLET: 50; 8.6 TABLET ORAL at 08:56

## 2024-08-20 RX ADMIN — PREGABALIN 200 MG: 100 CAPSULE ORAL at 20:26

## 2024-08-20 RX ADMIN — LEVALBUTEROL HYDROCHLORIDE 1.25 MG: 1.25 SOLUTION RESPIRATORY (INHALATION) at 20:53

## 2024-08-20 RX ADMIN — PREGABALIN 200 MG: 100 CAPSULE ORAL at 08:55

## 2024-08-20 RX ADMIN — HYDROMORPHONE HYDROCHLORIDE 0.3 MG: 1 INJECTION, SOLUTION INTRAMUSCULAR; INTRAVENOUS; SUBCUTANEOUS at 02:54

## 2024-08-20 RX ADMIN — CARBOXYMETHYLCELLULOSE SODIUM 1 DROP: 10 GEL OPHTHALMIC at 11:54

## 2024-08-20 RX ADMIN — SENNOSIDES AND DOCUSATE SODIUM 1 TABLET: 50; 8.6 TABLET ORAL at 20:26

## 2024-08-20 RX ADMIN — POLYETHYLENE GLYCOL 400 AND PROPYLENE GLYCOL 1 DROP: 4; 3 SOLUTION/ DROPS OPHTHALMIC at 16:16

## 2024-08-20 RX ADMIN — FLUTICASONE FUROATE AND VILANTEROL TRIFENATATE 1 PUFF: 200; 25 POWDER RESPIRATORY (INHALATION) at 08:58

## 2024-08-20 RX ADMIN — UMECLIDINIUM 1 PUFF: 62.5 AEROSOL, POWDER ORAL at 08:58

## 2024-08-20 RX ADMIN — POLYETHYLENE GLYCOL 400 AND PROPYLENE GLYCOL 1 DROP: 4; 3 SOLUTION/ DROPS OPHTHALMIC at 08:58

## 2024-08-20 RX ADMIN — DULOXETINE HYDROCHLORIDE 30 MG: 30 CAPSULE, DELAYED RELEASE ORAL at 20:26

## 2024-08-20 RX ADMIN — APIXABAN 5 MG: 5 TABLET, FILM COATED ORAL at 20:26

## 2024-08-20 RX ADMIN — CYANOCOBALAMIN TAB 500 MCG 500 MCG: 500 TAB at 08:55

## 2024-08-20 RX ADMIN — ATORVASTATIN CALCIUM 10 MG: 10 TABLET, FILM COATED ORAL at 08:56

## 2024-08-20 RX ADMIN — OMEPRAZOLE 20 MG: 20 CAPSULE, DELAYED RELEASE ORAL at 08:56

## 2024-08-20 RX ADMIN — CARBOXYMETHYLCELLULOSE SODIUM 1 DROP: 10 GEL OPHTHALMIC at 16:15

## 2024-08-20 RX ADMIN — POLYETHYLENE GLYCOL 400 AND PROPYLENE GLYCOL 1 DROP: 4; 3 SOLUTION/ DROPS OPHTHALMIC at 11:55

## 2024-08-20 RX ADMIN — APIXABAN 5 MG: 5 TABLET, FILM COATED ORAL at 08:56

## 2024-08-20 RX ADMIN — CARBOXYMETHYLCELLULOSE SODIUM 1 DROP: 10 GEL OPHTHALMIC at 20:26

## 2024-08-20 RX ADMIN — LIDOCAINE 2 PATCH: 4 PATCH TOPICAL at 20:34

## 2024-08-20 RX ADMIN — HYDROMORPHONE HYDROCHLORIDE 0.3 MG: 1 INJECTION, SOLUTION INTRAMUSCULAR; INTRAVENOUS; SUBCUTANEOUS at 23:36

## 2024-08-20 RX ADMIN — DULOXETINE HYDROCHLORIDE 30 MG: 30 CAPSULE, DELAYED RELEASE ORAL at 08:55

## 2024-08-20 RX ADMIN — INSULIN ASPART 1 UNITS: 100 INJECTION, SOLUTION INTRAVENOUS; SUBCUTANEOUS at 13:07

## 2024-08-20 ASSESSMENT — ACTIVITIES OF DAILY LIVING (ADL)
ADLS_ACUITY_SCORE: 31
ADLS_ACUITY_SCORE: 34
ADLS_ACUITY_SCORE: 31
ADLS_ACUITY_SCORE: 34
ADLS_ACUITY_SCORE: 31

## 2024-08-20 NOTE — PLAN OF CARE
Major Shift Events:     Neuro: Aox4. PERRLA. Afebrile.    CV: Sinus to Sinus tach, with occasional to frequent PVCs. Normotensive.   Resp: 2L NC. Complains of SOB when NC is off for eating.    GI: No BM this shift. Moderate consistent carb diet.   : javed in place.    Drips: Heparin turned off 2200.     For vital signs and complete assessments, please see documentation flowsheets.    Problem: Adult Inpatient Plan of Care  Goal: Optimal Comfort and Wellbeing  Outcome: Progressing  Intervention: Monitor Pain and Promote Comfort  Recent Flowsheet Documentation  Taken 8/20/2024 0300 by Reema Prieto RN  Pain Management Interventions: medication (see MAR)  Taken 8/19/2024 2000 by Reema Prieto RN  Pain Management Interventions: medication (see MAR)  Intervention: Provide Person-Centered Care  Recent Flowsheet Documentation  Taken 8/20/2024 0000 by Reema Prieto RN  Trust Relationship/Rapport:   care explained   choices provided   questions answered   emotional support provided   questions encouraged   reassurance provided   thoughts/feelings acknowledged   empathic listening provided  Taken 8/19/2024 2000 by Reema Prieto RN  Trust Relationship/Rapport:   care explained   choices provided   questions answered   emotional support provided   questions encouraged   reassurance provided   thoughts/feelings acknowledged   empathic listening provided     Problem: Pain Acute  Goal: Optimal Pain Control and Function  Outcome: Progressing  Intervention: Develop Pain Management Plan  Recent Flowsheet Documentation  Taken 8/20/2024 0300 by Reema Prieto RN  Pain Management Interventions: medication (see MAR)  Taken 8/19/2024 2000 by Reema Prieto RN  Pain Management Interventions: medication (see MAR)  Intervention: Prevent or Manage Pain  Recent Flowsheet Documentation  Taken 8/20/2024 0000 by Reema Prieto RN  Medication Review/Management:   medications reviewed   high-risk medications  identified  Taken 8/19/2024 2000 by Reema Prieto RN  Medication Review/Management:   medications reviewed   high-risk medications identified  Intervention: Optimize Psychosocial Wellbeing  Recent Flowsheet Documentation  Taken 8/20/2024 0000 by Reema Prieto RN  Supportive Measures:   active listening utilized   positive reinforcement provided   relaxation techniques promoted   verbalization of feelings encouraged  Taken 8/19/2024 2000 by Reema Prieto RN  Supportive Measures:   active listening utilized   positive reinforcement provided   relaxation techniques promoted   verbalization of feelings encouraged     Problem: Comorbidity Management  Goal: Blood Glucose Levels Within Targeted Range  Outcome: Progressing  Intervention: Monitor and Manage Glycemia  Recent Flowsheet Documentation  Taken 8/20/2024 0000 by Reema Prieto RN  Glycemic Management: blood glucose monitored  Medication Review/Management:   medications reviewed   high-risk medications identified  Taken 8/19/2024 2000 by Reema Prieto RN  Glycemic Management: blood glucose monitored  Medication Review/Management:   medications reviewed   high-risk medications identified

## 2024-08-20 NOTE — PROGRESS NOTES
Care Management Follow Up    Length of Stay (days): 6    Expected Discharge Date:  TBD     Concerns to be Addressed: discharge planning     Patient plan of care discussed at interdisciplinary rounds:  Did not attend rounds, SW just covering unit  partial day    Anticipated Discharge Disposition: Transitional Care     Anticipated Discharge Services:  (Post Acute Therapies)  Anticipated Discharge DME:  (SNF to provide all necessary DME)    Patient/family educated on Medicare website which has current facility and service quality ratings: yes  Education Provided on the Discharge Plan: Yes  Patient/Family in Agreement with the Plan: yes    Referrals Placed by CM/SW: Post Acute Facilities  Private pay costs discussed: Not applicable    Additional Information:    Received call from Samir Portillo liaison covering for Melissa (P: [phone]) wondering when patient will be ready for discharge.    Patient not medically ready for discharge today, per hospitalist progress note.     SW called Lindsey and informed her that patient may be ready tomorrow or the next day. Will have unit  follow up with Lindsey tomorrow.          SILVESTRE SchafferW, LSW  8 Med Surg   [facility]  Phone: [phone]  Vocera: Searchable by name or group: 8 Med Surg Vocera

## 2024-08-20 NOTE — PLAN OF CARE
Major shift changes:  EKG completed: sinus tachycardia  Javed removed  Neuro: A&Ox4, PERRLA; afebrile  Cardiac: Sinus tach, irregular rhythm. PRN metoprolol for HR >130  Respiratory: On 2L NC. Expiratory wheezes. Scheduled inhalers  GI: LBM 8/17; took 2 senna this AM. +BS, +fl. Tolerating regular diet with no NV  : Adequate UOP via javed; Javed removed at 1620   Skin: CDI  LDA: PIV x2  Plan: TBD  For vital signs and complete assessments, please see documentation flowsheets.

## 2024-08-20 NOTE — PROGRESS NOTES
[facility]    Medicine Progress Note - Hospitalist Service, GOLD TEAM 22    Date of Admission:  8/14/2024    Assessment & Plan   Jenn Aparicio is a 69 year old female with a history of O2 dependent COPD (2L O2 @ rest and 3L with exertion), Lung adenocarcinoma s/p RLL lobectomy (2/2016), SBRT to RUL nodule (1/2020) DMII c/b peripheral neuropathy, and HTN, HLD, who was initially admitted to [facility] on 8/14/2024 for further evaluation and treatment of acute worsening back pain, left ankle pain and difficulty walking. Patient required admission to the ICU 8/16 due to new onset a fib with RVR and subsequent hypotension requiring pressors. Patient stabilized and appropriate for transition to the floor with medicine as primary on 8/18.     Changes Today:  - Continue diltiazem 30 mg q8h  - Appears in sinus by tele, EKG ordered to confirm  - Trial of void  - Anticipate medical readiness in next 1-2 days     A fib with RVR  Hypotension, resolved  Patient developed new onset tachycardia as well as mild hypotension. EKG ordered and confirm a fib. A rapid response was subsequently called and patient received one dose of IV metoprolol with modest improvement in heart rate. Was started on amiodarone drip as well as Norepinephrine due to ongoing a fib and hypotension.  Unclear exact etiology behind a fib, no prior history. Question if due to hypovolemia as patient also noted to have new DARIO, but question if also related to known malignancy. Echo unremarkable. Patient converted back to NSR early AM on 8/17 and was able to be weaned off Norepinephrine late 8/17. Started on oral metoprolol BID and amiodarone discontinued. Due to persistence, switched  to Diltiazem 30 mg q6h and switching to long acting - 120 mg Q daily (8/19) with overlap  -  ANTONINA?DS?-VASc score of 4, and started on heparin low intensity with transition to Eliquis   - Continue tele for now, follow BMP, magnesium levels and  target potassium >4, magnesium >2. Currently in sinus on tele  - Hold losartan  - Cardiology follow-up on discharge (order placed in navigator)    DARIO, resolved  Patient noted to have DARIO on 8/16 after being found to have new onset a fib. Felt likely to be pre-renal in the setting of poor oral intake.   - resolved with IVF    Acute on chronic low back pain:    History of chronic low back pain and imaging from 6/26/24 showed possible metastatic lesion. PTA Cymbalta, lyrica. Reported increase in symptoms on presentation. MRI of lumbar spine completed and found to evidence of metastatic disease, but did showed multilevel degenerative disease. Case discussed with neurosurgery who recommended outpatient follow-up.  - Pain management  - Continue PTA Medications  - Lidocaine patch PRN  - Tylenol TID scheduled  - Flexeril TID PRN  - Oxycodone 5-10 mg q4h PRN  - Dilaudid 0.3 mg IV q4h PRN  - Follow up with neurosurgery as outpatient     Left ankle pain and swelling  Difficulty ambulating  Presented with ~ 3 days hx of left ankle pain and swelling with inability ambulate.  Xray on admission w/o acute osseus abnormality, though with soft tissue swelling over dorsum of foot and medial ankle. LLE US w/o DVT. Possible sprain vs infection vs other. Ortho service consulted and felt no acute intervention need. No concerns of septic arthritis and conservative management planned.  - Pain management as above  - PT/OT  - Fall precautions     Severe O2 dependent COPD:   Chronic Hypoxic respiratory failure  Baseline O2 requirements of 2L at rest and 3L w/ exertion, currently on 2L.  Home medications include: PRN albuterol, duoneb, mucomyst, Trelegy ellipta, and Roflumilast.  PFT s 4/21/23. FEV1 0.55 (24%), FVC 1.36 (46 %), DLCOunc 3.68 (17%). Of note, roflumilast held on admission as patient reported taking only PRN and continues to be on hold given it can precipitate A fib.   - oxygen to keep O2 Saturation 90-92%  - Continue PTA  "medications (except Roflumilast)  - Follow up with Pulmonology as scheduled     Poorly controlled DMII  Hgb A1C (8.7- 7/9/24). Home medications include jaridance and weekly tirzepatide.   - HOLD PTA medications  - LSSI TID with meals and qhs  - MOD CHO diet  - BG checks TID with meals and qhs      HTN  Blood pressure mildly elevated  on admission at 150/84.  Home medications include losartan. This medication was held due to hypotension as noted above.   - Continue to hold PTA losartan for now     NSCLC, adenocarcinoma- RUL,RLL  S/p RLL lobectomy (2/2016)   Dx 1/2015. STAGE: IA2 [id number] poorly diff adeno RLL, then [id number] IA2 suspected NSCLC RUL, medically inoperable S/p RLL lobectomy (2/2016) and SBRT to RUL nodule (1/2020). Follows with Dr. Leung with recent clinic visit 7/29/24 w/ plan for 3 months follow up.  - Follow up with Oncology as scheduled    GERD Continue PTA PPI.           Diet: Moderate Consistent Carb (60 g CHO per Meal) Diet  Diet  Snacks/Supplements Adult: Glucerna; With Meals    DVT Prophylaxis: Heparin SQ  Andrade Catheter: PRESENT, indication:  (Asked provider if this can be removed today; no update yet)  Lines: None     Cardiac Monitoring: ACTIVE order. Indication: ICU  Code Status: Full Code      Clinically Significant Risk Factors              # Hypoalbuminemia: Lowest albumin = 3.4 g/dL at 8/16/2024 11:21 PM, will monitor as appropriate              # DMII: A1C = 8.7 % (Ref range: <5.7 %) within past 6 months   # Obesity: Estimated body mass index is 32.63 kg/m  as calculated from the following:    Height as of this encounter: 1.676 m (5' 6\").    Weight as of this encounter: 91.7 kg (202 lb 2.6 oz).        # Financial/Environmental Concerns: none               Disposition Plan     Medically Ready for Discharge: Anticipated in 2-4 Days           The patient's care was discussed with the Patient.    Nova Tuttle MD  Hospitalist Service, GOLD TEAM 22  [facility] " [facility]  Securely message with [employer] (more info)  Text page via [facility] Paging/Directory   See signed in provider for up to date coverage information  ______________________________________________________________________    Interval History     Reports continued back pain she associates with the bed. Denies palpitations, chest pain, shortness of breath. Cough has continued to improve. No additional concerns.    Physical Exam   Vital Signs: Temp: 98.6  F (37  C) Temp src: Oral BP: (!) 157/79 Pulse: 109   Resp: 24 SpO2: 98 % O2 Device: Nasal cannula Oxygen Delivery: 2 LPM  Weight: 202 lbs 2.59 oz  General Appearance: Comfortable, nontoxic appearing female seen laying in bed.  Eyes: PERRLA.  No conjunctival icterus.  HEENT: Atraumatic.  Respiratory: Breathing comfortably on supplemental oxygen.  Cardiovascular: normal rate, no murmurs  Lymph/Hematologic: No bruising on exposed skin.  Skin: No lesions or rashes noted on exposed skin.  Musculoskeletal: Moving all extremities spontaneously.  Neurologic: Cranial nerves II through XII grossly intact.  Psychiatric: Mood appropriate    Medical Decision Making       55 MINUTES SPENT BY ME on the date of service doing chart review, history, exam, documentation & further activities per the note.      Data   Imaging results reviewed over the past 24 hrs:   No results found for this or any previous visit (from the past 24 hour(s)).  Recent Labs   Lab 08/20/24  1209 08/20/24  0811 08/20/24  0632 08/19/24  1220 08/19/24  0651 08/18/24  2210 08/18/24  1822 08/18/24  0825 08/18/24  0555 08/17/24  0103 08/16/24  2321 08/15/24  1136 08/15/24  1101   WBC  --   --  7.3  --  8.0  --  8.8  --  8.3   < >  --    < > 10.3   HGB  --   --  10.6*  --  10.5*  --  10.9*  --  10.4*   < >  --    < > 12.8   MCV  --   --  87  --  87  --  88  --  89   < >  --    < > 88   PLT  --   --  256  --  236  --  226  --  213   < >  --    < > 249   INR  --   --   --   --   --   --    --   --   --   --   --   --  1.15   NA  --   --  139  --  137  --   --   --  136   < > 132*   < > 139   POTASSIUM  --   --  4.5  --  3.8  --   --   --  4.1   < > 3.5   < > 3.6   CHLORIDE  --   --  103  --  102  --   --   --  103   < > 97*   < > 100   CO2  --   --  30*  --  28  --   --   --  23   < > 23   < > 25   BUN  --   --  6.2*  --  6.0*  --   --   --  11.9   < > 13.9   < > 12.0   CR  --   --  0.52  --  0.39*  --   --   --  0.47*   < > 1.18*   < > 0.50*   ANIONGAP  --   --  6*  --  7  --   --   --  10   < > 12   < > 14   LUIGI  --   --  8.7*  --  8.3*  --   --   --  8.5*   < > 8.7*   < > 8.6*   * 100* 105*   < > 121*   < >  --    < > 149*   < > 171*   < > 152*   ALBUMIN  --   --   --   --   --   --   --   --   --   --  3.4*  --   --    PROTTOTAL  --   --   --   --   --   --   --   --   --   --  6.8  --   --    BILITOTAL  --   --   --   --   --   --   --   --   --   --  0.6  --   --    ALKPHOS  --   --   --   --   --   --   --   --   --   --  89  --   --    ALT  --   --   --   --   --   --   --   --   --   --  6  --   --    AST  --   --   --   --   --   --   --   --   --   --  21  --   --     < > = values in this interval not displayed.

## 2024-08-20 NOTE — CONSULTS
SPIRITUAL HEALTH SERVICES - Consult Note [facility]     Referral Source/Reason for Visit:   initiated length of stay        Summary and Recommendations -  *   Introduced Jenn to Spiritual health services  Jenn shares that she is Alevism but does not have a Muslim community  Jenn knows that she can request support from Spiritual Health when a need arises    Spiritual Health Services will remain available on request.  Please place a standard consult order in Epic.           Nelda Francois   Chaplain Resident  Pager [phone]    [facility] available 24/7 for emergent requests/referrals, either by paging the on-call  or by entering an ASAP/STAT consult in [employer], which will also page the on-call .         * [facility] remains available 24/7 for emergent requests/referrals, either by having the switchboard page the on-call  or by entering an ASAP/STAT consult in Epic (this will also page the on-call ). Routine Epic consults receive an initial respon

## 2024-08-21 ENCOUNTER — APPOINTMENT (OUTPATIENT)
Dept: ULTRASOUND IMAGING | Facility: CLINIC | Age: 69
DRG: 551 | End: 2024-08-21
Attending: STUDENT IN AN ORGANIZED HEALTH CARE EDUCATION/TRAINING PROGRAM
Payer: COMMERCIAL

## 2024-08-21 LAB
ANION GAP SERPL CALCULATED.3IONS-SCNC: 7 MMOL/L (ref 7–15)
ATRIAL RATE - MUSE: 106 BPM
BUN SERPL-MCNC: 4.9 MG/DL (ref 8–23)
CALCIUM SERPL-MCNC: 8.4 MG/DL (ref 8.8–10.4)
CHLORIDE SERPL-SCNC: 102 MMOL/L (ref 98–107)
CREAT SERPL-MCNC: 0.4 MG/DL (ref 0.51–0.95)
DIASTOLIC BLOOD PRESSURE - MUSE: NORMAL MMHG
EGFRCR SERPLBLD CKD-EPI 2021: >90 ML/MIN/1.73M2
ERYTHROCYTE [DISTWIDTH] IN BLOOD BY AUTOMATED COUNT: 13.5 % (ref 10–15)
GLUCOSE BLDC GLUCOMTR-MCNC: 108 MG/DL (ref 70–99)
GLUCOSE BLDC GLUCOMTR-MCNC: 119 MG/DL (ref 70–99)
GLUCOSE BLDC GLUCOMTR-MCNC: 128 MG/DL (ref 70–99)
GLUCOSE BLDC GLUCOMTR-MCNC: 154 MG/DL (ref 70–99)
GLUCOSE BLDC GLUCOMTR-MCNC: 182 MG/DL (ref 70–99)
GLUCOSE SERPL-MCNC: 121 MG/DL (ref 70–99)
HCO3 SERPL-SCNC: 32 MMOL/L (ref 22–29)
HCT VFR BLD AUTO: 33.8 % (ref 35–47)
HGB BLD-MCNC: 10.9 G/DL (ref 11.7–15.7)
INTERPRETATION ECG - MUSE: NORMAL
MAGNESIUM SERPL-MCNC: 1.7 MG/DL (ref 1.7–2.3)
MCH RBC QN AUTO: 28.7 PG (ref 26.5–33)
MCHC RBC AUTO-ENTMCNC: 32.2 G/DL (ref 31.5–36.5)
MCV RBC AUTO: 89 FL (ref 78–100)
P AXIS - MUSE: 79 DEGREES
PHOSPHATE SERPL-MCNC: 2.4 MG/DL (ref 2.5–4.5)
PLATELET # BLD AUTO: 272 10E3/UL (ref 150–450)
POTASSIUM SERPL-SCNC: 3.8 MMOL/L (ref 3.4–5.3)
PR INTERVAL - MUSE: 142 MS
QRS DURATION - MUSE: 84 MS
QT - MUSE: 324 MS
QTC - MUSE: 430 MS
R AXIS - MUSE: 59 DEGREES
RBC # BLD AUTO: 3.8 10E6/UL (ref 3.8–5.2)
SODIUM SERPL-SCNC: 141 MMOL/L (ref 135–145)
SYSTOLIC BLOOD PRESSURE - MUSE: NORMAL MMHG
T AXIS - MUSE: 64 DEGREES
VENTRICULAR RATE- MUSE: 106 BPM
WBC # BLD AUTO: 7.3 10E3/UL (ref 4–11)

## 2024-08-21 PROCEDURE — 250N000013 HC RX MED GY IP 250 OP 250 PS 637: Performed by: PHYSICIAN ASSISTANT

## 2024-08-21 PROCEDURE — 85027 COMPLETE CBC AUTOMATED: CPT | Performed by: HOSPITALIST

## 2024-08-21 PROCEDURE — 99232 SBSQ HOSP IP/OBS MODERATE 35: CPT | Performed by: STUDENT IN AN ORGANIZED HEALTH CARE EDUCATION/TRAINING PROGRAM

## 2024-08-21 PROCEDURE — 36415 COLL VENOUS BLD VENIPUNCTURE: CPT | Performed by: HOSPITALIST

## 2024-08-21 PROCEDURE — 120N000002 HC R&B MED SURG/OB UMMC

## 2024-08-21 PROCEDURE — 83735 ASSAY OF MAGNESIUM: CPT | Performed by: HOSPITALIST

## 2024-08-21 PROCEDURE — 999N000157 HC STATISTIC RCP TIME EA 10 MIN

## 2024-08-21 PROCEDURE — 250N000009 HC RX 250

## 2024-08-21 PROCEDURE — 80048 BASIC METABOLIC PNL TOTAL CA: CPT | Performed by: HOSPITALIST

## 2024-08-21 PROCEDURE — 250N000013 HC RX MED GY IP 250 OP 250 PS 637

## 2024-08-21 PROCEDURE — 94640 AIRWAY INHALATION TREATMENT: CPT

## 2024-08-21 PROCEDURE — 93970 EXTREMITY STUDY: CPT

## 2024-08-21 PROCEDURE — 94640 AIRWAY INHALATION TREATMENT: CPT | Mod: 76

## 2024-08-21 PROCEDURE — 250N000009 HC RX 250: Performed by: PHYSICIAN ASSISTANT

## 2024-08-21 PROCEDURE — 250N000013 HC RX MED GY IP 250 OP 250 PS 637: Performed by: STUDENT IN AN ORGANIZED HEALTH CARE EDUCATION/TRAINING PROGRAM

## 2024-08-21 PROCEDURE — 93970 EXTREMITY STUDY: CPT | Mod: 26 | Performed by: RADIOLOGY

## 2024-08-21 PROCEDURE — 250N000009 HC RX 250: Performed by: STUDENT IN AN ORGANIZED HEALTH CARE EDUCATION/TRAINING PROGRAM

## 2024-08-21 PROCEDURE — 250N000013 HC RX MED GY IP 250 OP 250 PS 637: Performed by: HOSPITALIST

## 2024-08-21 PROCEDURE — 84100 ASSAY OF PHOSPHORUS: CPT | Performed by: STUDENT IN AN ORGANIZED HEALTH CARE EDUCATION/TRAINING PROGRAM

## 2024-08-21 RX ORDER — LEVALBUTEROL INHALATION SOLUTION 1.25 MG/3ML
1.25 SOLUTION RESPIRATORY (INHALATION) EVERY 4 HOURS PRN
Status: DISCONTINUED | OUTPATIENT
Start: 2024-08-21 | End: 2024-08-26 | Stop reason: HOSPADM

## 2024-08-21 RX ORDER — POTASSIUM CHLORIDE 1.5 G/1.58G
20 POWDER, FOR SOLUTION ORAL ONCE
Status: COMPLETED | OUTPATIENT
Start: 2024-08-21 | End: 2024-08-21

## 2024-08-21 RX ORDER — DILTIAZEM HYDROCHLORIDE 60 MG/1
60 CAPSULE, EXTENDED RELEASE ORAL 2 TIMES DAILY
Status: DISCONTINUED | OUTPATIENT
Start: 2024-08-21 | End: 2024-08-22

## 2024-08-21 RX ADMIN — POLYETHYLENE GLYCOL 400 AND PROPYLENE GLYCOL 1 DROP: 4; 3 SOLUTION/ DROPS OPHTHALMIC at 13:00

## 2024-08-21 RX ADMIN — PREGABALIN 200 MG: 100 CAPSULE ORAL at 09:33

## 2024-08-21 RX ADMIN — PREGABALIN 200 MG: 100 CAPSULE ORAL at 20:14

## 2024-08-21 RX ADMIN — UMECLIDINIUM 1 PUFF: 62.5 AEROSOL, POWDER ORAL at 09:40

## 2024-08-21 RX ADMIN — DILTIAZEM HYDROCHLORIDE 60 MG: 60 CAPSULE, EXTENDED RELEASE ORAL at 20:13

## 2024-08-21 RX ADMIN — ACETAMINOPHEN 975 MG: 325 TABLET ORAL at 20:20

## 2024-08-21 RX ADMIN — POLYETHYLENE GLYCOL 3350 17 G: 17 POWDER, FOR SOLUTION ORAL at 20:16

## 2024-08-21 RX ADMIN — CETIRIZINE HYDROCHLORIDE 10 MG: 10 TABLET, FILM COATED ORAL at 09:33

## 2024-08-21 RX ADMIN — ACETAMINOPHEN 975 MG: 325 TABLET ORAL at 14:06

## 2024-08-21 RX ADMIN — CARBOXYMETHYLCELLULOSE SODIUM 1 DROP: 10 GEL OPHTHALMIC at 13:00

## 2024-08-21 RX ADMIN — DULOXETINE HYDROCHLORIDE 30 MG: 30 CAPSULE, DELAYED RELEASE ORAL at 20:14

## 2024-08-21 RX ADMIN — POLYETHYLENE GLYCOL 3350 17 G: 17 POWDER, FOR SOLUTION ORAL at 09:34

## 2024-08-21 RX ADMIN — POLYETHYLENE GLYCOL 400 AND PROPYLENE GLYCOL 1 DROP: 4; 3 SOLUTION/ DROPS OPHTHALMIC at 20:18

## 2024-08-21 RX ADMIN — APIXABAN 5 MG: 5 TABLET, FILM COATED ORAL at 09:33

## 2024-08-21 RX ADMIN — INSULIN ASPART 1 UNITS: 100 INJECTION, SOLUTION INTRAVENOUS; SUBCUTANEOUS at 12:58

## 2024-08-21 RX ADMIN — LEVALBUTEROL HYDROCHLORIDE 1.25 MG: 1.25 SOLUTION RESPIRATORY (INHALATION) at 08:26

## 2024-08-21 RX ADMIN — ATORVASTATIN CALCIUM 10 MG: 10 TABLET, FILM COATED ORAL at 09:33

## 2024-08-21 RX ADMIN — OXYCODONE HYDROCHLORIDE 5 MG: 5 TABLET ORAL at 14:06

## 2024-08-21 RX ADMIN — CARBOXYMETHYLCELLULOSE SODIUM 1 DROP: 10 GEL OPHTHALMIC at 09:34

## 2024-08-21 RX ADMIN — ASPIRIN 81 MG: 81 TABLET, COATED ORAL at 09:33

## 2024-08-21 RX ADMIN — CARBOXYMETHYLCELLULOSE SODIUM 1 DROP: 10 GEL OPHTHALMIC at 20:15

## 2024-08-21 RX ADMIN — POTASSIUM CHLORIDE 20 MEQ: 1.5 POWDER, FOR SOLUTION ORAL at 09:34

## 2024-08-21 RX ADMIN — ACETYLCYSTEINE 4 ML: 100 SOLUTION ORAL; RESPIRATORY (INHALATION) at 01:07

## 2024-08-21 RX ADMIN — DILTIAZEM HYDROCHLORIDE 120 MG: 120 CAPSULE, COATED, EXTENDED RELEASE ORAL at 09:33

## 2024-08-21 RX ADMIN — SENNOSIDES AND DOCUSATE SODIUM 1 TABLET: 50; 8.6 TABLET ORAL at 20:13

## 2024-08-21 RX ADMIN — OMEPRAZOLE 20 MG: 20 CAPSULE, DELAYED RELEASE ORAL at 09:33

## 2024-08-21 RX ADMIN — LEVALBUTEROL HYDROCHLORIDE 1.25 MG: 1.25 SOLUTION RESPIRATORY (INHALATION) at 01:07

## 2024-08-21 RX ADMIN — POLYETHYLENE GLYCOL 400 AND PROPYLENE GLYCOL 1 DROP: 4; 3 SOLUTION/ DROPS OPHTHALMIC at 16:08

## 2024-08-21 RX ADMIN — LEVALBUTEROL HYDROCHLORIDE 1.25 MG: 1.25 SOLUTION RESPIRATORY (INHALATION) at 21:22

## 2024-08-21 RX ADMIN — POLYETHYLENE GLYCOL 400 AND PROPYLENE GLYCOL 1 DROP: 4; 3 SOLUTION/ DROPS OPHTHALMIC at 09:40

## 2024-08-21 RX ADMIN — CARBOXYMETHYLCELLULOSE SODIUM 1 DROP: 10 GEL OPHTHALMIC at 16:08

## 2024-08-21 RX ADMIN — LIDOCAINE 2 PATCH: 4 PATCH TOPICAL at 20:16

## 2024-08-21 RX ADMIN — FLUTICASONE PROPIONATE 1 SPRAY: 50 SPRAY, METERED NASAL at 20:16

## 2024-08-21 RX ADMIN — APIXABAN 5 MG: 5 TABLET, FILM COATED ORAL at 20:15

## 2024-08-21 RX ADMIN — FLUTICASONE PROPIONATE 1 SPRAY: 50 SPRAY, METERED NASAL at 09:40

## 2024-08-21 RX ADMIN — CYANOCOBALAMIN TAB 500 MCG 500 MCG: 500 TAB at 09:33

## 2024-08-21 RX ADMIN — FLUTICASONE FUROATE AND VILANTEROL TRIFENATATE 1 PUFF: 200; 25 POWDER RESPIRATORY (INHALATION) at 09:41

## 2024-08-21 RX ADMIN — DULOXETINE HYDROCHLORIDE 30 MG: 30 CAPSULE, DELAYED RELEASE ORAL at 09:33

## 2024-08-21 RX ADMIN — SENNOSIDES AND DOCUSATE SODIUM 1 TABLET: 50; 8.6 TABLET ORAL at 09:33

## 2024-08-21 RX ADMIN — OXYCODONE HYDROCHLORIDE 5 MG: 5 TABLET ORAL at 18:38

## 2024-08-21 ASSESSMENT — ACTIVITIES OF DAILY LIVING (ADL)
ADLS_ACUITY_SCORE: 28
ADLS_ACUITY_SCORE: 34
ADLS_ACUITY_SCORE: 34
ADLS_ACUITY_SCORE: 28
ADLS_ACUITY_SCORE: 28
ADLS_ACUITY_SCORE: 34
ADLS_ACUITY_SCORE: 34
ADLS_ACUITY_SCORE: 28
ADLS_ACUITY_SCORE: 28
ADLS_ACUITY_SCORE: 29
ADLS_ACUITY_SCORE: 28
ADLS_ACUITY_SCORE: 34
ADLS_ACUITY_SCORE: 28
ADLS_ACUITY_SCORE: 34
ADLS_ACUITY_SCORE: 28
ADLS_ACUITY_SCORE: 34

## 2024-08-21 NOTE — PROGRESS NOTES
[facility]    Medicine Progress Note - Hospitalist Service, GOLD TEAM 22    Date of Admission:  8/14/2024    Assessment & Plan   Jenn Aparicio is a 69 year old female with a history of O2 dependent COPD (2L O2 @ rest and 3L with exertion), Lung adenocarcinoma s/p RLL lobectomy (2/2016), SBRT to RUL nodule (1/2020) DMII c/b peripheral neuropathy, and HTN, HLD, who was initially admitted to [facility] on 8/14/2024 for further evaluation and treatment of acute worsening back pain, left ankle pain and difficulty walking. Patient required admission to the ICU 8/16 due to new onset a fib with RVR and subsequent hypotension requiring pressors. Patient stabilized and appropriate for transition to the floor with medicine as primary on 8/18.     Changes Today:  - Decrease diltiazem to 60 mg Q12H, remains in sinus  - Repeat bilateral LE doppler with interval onset of swelling and left popliteal tenderness       A fib with RVR  Hypotension, resolved  Patient developed new onset tachycardia as well as mild hypotension. EKG ordered and confirm a fib. A rapid response was subsequently called and patient received one dose of IV metoprolol with modest improvement in heart rate. Was started on amiodarone drip as well as Norepinephrine due to ongoing a fib and hypotension.  Unclear exact etiology behind a fib, no prior history. Question if due to hypovolemia as patient also noted to have new DARIO, but question if also related to known malignancy. Echo unremarkable. Patient converted back to NSR early AM on 8/17 and was able to be weaned off Norepinephrine late 8/17. Started on oral metoprolol BID and amiodarone discontinued. Due to persistence, switched  to Diltiazem 30 mg q6h and switching to long acting - 120 mg Q daily (8/19) with overlap  -  ANTONINA?DS?-VASc score of 4, and started on heparin low intensity with transition to Eliquis   - Continue tele for now, follow BMP, magnesium levels and target  potassium >4, magnesium >2. Currently in sinus on tele  - Resume losartan on 8/22  - Cardiology follow-up on discharge (order placed in navigator)    DARIO, resolved  Patient noted to have DARIO on 8/16 after being found to have new onset a fib. Felt likely to be pre-renal in the setting of poor oral intake.   - resolved with IVF    Acute on chronic low back pain:    History of chronic low back pain and imaging from 6/26/24 showed possible metastatic lesion. PTA Cymbalta, lyrica. Reported increase in symptoms on presentation. MRI of lumbar spine completed and found to evidence of metastatic disease, but did showed multilevel degenerative disease. Case discussed with neurosurgery who recommended outpatient follow-up.  - Pain management  - Continue PTA Medications  - Lidocaine patch PRN  - Tylenol TID scheduled  - Flexeril TID PRN  - Oxycodone 5-10 mg q4h PRN  - Dilaudid 0.3 mg IV q4h PRN-- discontinue IV opioids  - Follow up with neurosurgery as outpatient     Left ankle pain and swelling  Difficulty ambulating  Presented with ~ 3 days hx of left ankle pain and swelling with inability ambulate.  Xray on admission w/o acute osseus abnormality, though with soft tissue swelling over dorsum of foot and medial ankle. LLE US w/o DVT. Possible sprain vs infection vs other. Ortho service consulted and felt no acute intervention need. No concerns of septic arthritis and conservative management planned.  - Pain management as above  - PT/OT  - Fall precautions     Severe O2 dependent COPD:   Chronic Hypoxic respiratory failure  Baseline O2 requirements of 2L at rest and 3L w/ exertion, currently on 2L.  Home medications include: PRN albuterol, duoneb, mucomyst, Trelegy ellipta, and Roflumilast.  PFT s 4/21/23. FEV1 0.55 (24%), FVC 1.36 (46 %), DLCOunc 3.68 (17%). Of note, roflumilast held on admission as patient reported taking only PRN and continues to be on hold given it can precipitate A fib.   - oxygen to keep O2 Saturation  "90-92%  - Continue PTA medications (except Roflumilast)  - Follow up with Pulmonology as scheduled     Poorly controlled DMII  Hgb A1C (8.7- 7/9/24). Home medications include jaridance and weekly tirzepatide.   - HOLD PTA medications  - LSSI TID with meals and qhs  - MOD CHO diet  - BG checks TID with meals and qhs      HTN  Blood pressure mildly elevated  on admission at 150/84.  Home medications include losartan. This medication was held due to hypotension as noted above.   - Continue to hold PTA losartan for now     NSCLC, adenocarcinoma- RUL,RLL  S/p RLL lobectomy (2/2016)   Dx 1/2015. STAGE: IA2 [id number] poorly diff adeno RLL, then [id number] IA2 suspected NSCLC RUL, medically inoperable S/p RLL lobectomy (2/2016) and SBRT to RUL nodule (1/2020). Follows with Dr. Leung with recent clinic visit 7/29/24 w/ plan for 3 months follow up.  - Follow up with Oncology as scheduled    GERD Continue PTA PPI.           Diet: Moderate Consistent Carb (60 g CHO per Meal) Diet  Diet  Snacks/Supplements Adult: Glucerna; With Meals    DVT Prophylaxis: Heparin SQ  Andrade Catheter: Not present  Lines: None     Cardiac Monitoring: ACTIVE order. Indication: ICU  Code Status: Full Code      Clinically Significant Risk Factors              # Hypoalbuminemia: Lowest albumin = 3.4 g/dL at 8/16/2024 11:21 PM, will monitor as appropriate              # DMII: A1C = 8.7 % (Ref range: <5.7 %) within past 6 months   # Obesity: Estimated body mass index is 34.2 kg/m  as calculated from the following:    Height as of this encounter: 1.676 m (5' 6\").    Weight as of this encounter: 96.1 kg (211 lb 13.8 oz).        # Financial/Environmental Concerns: none               Disposition Plan     Medically Ready for Discharge: Anticipated in 2-4 Days           The patient's care was discussed with the Patient.    Nova Tuttle MD  Hospitalist Service, GOLD TEAM 22  [facility]  Securely message " with Nan (more info)  Text page via [facility] Paging/Directory   See signed in provider for up to date coverage information  ______________________________________________________________________    Interval History     Reports continued back pain she associates with the bed. Denies palpitations, chest pain, shortness of breath. Cough has continued to improve. Notes bilateral lower extremity edema. No additional concerns.    Up and moving around with nursing earlier in the day without dyspnea or significant tachycardia. Appetite adequate. Moving bowels.    Physical Exam   Vital Signs: Temp: 99  F (37.2  C) Temp src: Oral BP: (!) 138/99 Pulse: 100   Resp: 23 SpO2: 98 % O2 Device: Nasal cannula Oxygen Delivery: 2 LPM  Weight: 211 lbs 13.79 oz  General Appearance: Comfortable, nontoxic appearing female seen laying in bed.  Eyes: PERRLA.  No conjunctival icterus.  HEENT: Atraumatic.  Respiratory: Breathing comfortably on supplemental oxygen.  Cardiovascular: normal rate, no murmurs  Lymph/Hematologic: No bruising on exposed skin.  Skin: No lesions or rashes noted on exposed skin.  Musculoskeletal: Moving all extremities spontaneously.  Neurologic: Cranial nerves II through XII grossly intact.  Psychiatric: Mood appropriate    Medical Decision Making       55 MINUTES SPENT BY ME on the date of service doing chart review, history, exam, documentation & further activities per the note.      Data   Imaging results reviewed over the past 24 hrs:   No results found for this or any previous visit (from the past 24 hour(s)).  Recent Labs   Lab 08/21/24  0833 08/21/24  0526 08/20/24  2209 08/20/24  0811 08/20/24  0632 08/19/24  1220 08/19/24  0651 08/17/24  0103 08/16/24  2321 08/15/24  1136 08/15/24  1101   WBC  --  7.3  --   --  7.3  --  8.0   < >  --    < > 10.3   HGB  --  10.9*  --   --  10.6*  --  10.5*   < >  --    < > 12.8   MCV  --  89  --   --  87  --  87   < >  --    < > 88   PLT  --  272  --   --  256  --  236   < >   --    < > 249   INR  --   --   --   --   --   --   --   --   --   --  1.15   NA  --  141  --   --  139  --  137   < > 132*   < > 139   POTASSIUM  --  3.8  --   --  4.5  --  3.8   < > 3.5   < > 3.6   CHLORIDE  --  102  --   --  103  --  102   < > 97*   < > 100   CO2  --  32*  --   --  30*  --  28   < > 23   < > 25   BUN  --  4.9*  --   --  6.2*  --  6.0*   < > 13.9   < > 12.0   CR  --  0.40*  --   --  0.52  --  0.39*   < > 1.18*   < > 0.50*   ANIONGAP  --  7  --   --  6*  --  7   < > 12   < > 14   LUIGI  --  8.4*  --   --  8.7*  --  8.3*   < > 8.7*   < > 8.6*   * 121* 137*   < > 105*   < > 121*   < > 171*   < > 152*   ALBUMIN  --   --   --   --   --   --   --   --  3.4*  --   --    PROTTOTAL  --   --   --   --   --   --   --   --  6.8  --   --    BILITOTAL  --   --   --   --   --   --   --   --  0.6  --   --    ALKPHOS  --   --   --   --   --   --   --   --  89  --   --    ALT  --   --   --   --   --   --   --   --  6  --   --    AST  --   --   --   --   --   --   --   --  21  --   --     < > = values in this interval not displayed.

## 2024-08-21 NOTE — PLAN OF CARE
Goal Outcome Evaluation:    Called report to 6 med/surg at 1:56 and patient transferred around 2:20 after pt finished eating. Pt stable upon transfer; belongings sent with patient and pain meds were given prior to transfer.    Plan: Bilat lower extremity US today, plan to discharge Thursday or Friday.

## 2024-08-21 NOTE — PLAN OF CARE
"Goal Outcome Evaluation:    VS: /70 (BP Location: Right arm)   Pulse 95   Temp 96.8  F (36  C) (Oral)   Resp 19   Ht 1.676 m (5' 6\")   Wt 96.1 kg (211 lb 13.8 oz)   SpO2 98%   BMI 34.20 kg/m     O2: Stable on baseline 2L nasal cannula    Output: Purewick in place    Last BM: 8/20/2024   Activity: Assist x1 with walker and gait belt    Up for meals? yes   Skin: Intact    Pain: Denies pain   CMS: Intact, A/Ox4 with intermittent confusion, numbness to LLE which patient says is her baseline    Dressing: N/a   Diet: Moderate consistent carb diet    LDA: L and R PIV- both SL    Equipment: IV pump and pole, personal  belongings, call light within reach    Plan: TBD, continue POC.         Plan of Care Reviewed With: patient    Overall Patient Progress: improvingOverall Patient Progress: improving           "

## 2024-08-21 NOTE — PLAN OF CARE
Major Shift Events:     Neuro: RASS 0. Aox4 with intermittent confusion. Tmax 99.0. PERRLA   CV: Sinus dysrythmia, sinus tach. Normotensive to hypertensive.    Resp. 2L NC. Expiratory wheezes. Complained of feeling congested   GI: Large BM this shift. Maintains moderate consistent carb diet with poor appetite.    : Voids spontaneously without difficulty.    Skin: CDI    For vital signs and complete assessments, please see documentation flowsheets.      Problem: Adult Inpatient Plan of Care  Goal: Optimal Comfort and Wellbeing  Outcome: Progressing  Intervention: Monitor Pain and Promote Comfort  Recent Flowsheet Documentation  Taken 8/21/2024 0000 by Reema Prieto RN  Pain Management Interventions:   medication (see MAR)   pillow support provided   quiet environment facilitated   relaxation techniques promoted   repositioned   rest  Taken 8/20/2024 2000 by Reema Prieto RN  Pain Management Interventions:   medication (see MAR)   pillow support provided   quiet environment facilitated   relaxation techniques promoted   repositioned   rest  Intervention: Provide Person-Centered Care  Recent Flowsheet Documentation  Taken 8/21/2024 0000 by Reema Prieto RN  Trust Relationship/Rapport:   care explained   choices provided   questions answered   questions encouraged   reassurance provided   thoughts/feelings acknowledged  Taken 8/20/2024 2000 by Reema Prieto RN  Trust Relationship/Rapport:   care explained   choices provided   questions answered   questions encouraged   reassurance provided   thoughts/feelings acknowledged     Problem: Pain Acute  Goal: Optimal Pain Control and Function  Outcome: Progressing  Intervention: Develop Pain Management Plan  Recent Flowsheet Documentation  Taken 8/21/2024 0000 by Reema Prieto RN  Pain Management Interventions:   medication (see MAR)   pillow support provided   quiet environment facilitated   relaxation techniques promoted   repositioned    rest  Taken 8/20/2024 2000 by Reema Prieto RN  Pain Management Interventions:   medication (see MAR)   pillow support provided   quiet environment facilitated   relaxation techniques promoted   repositioned   rest  Intervention: Prevent or Manage Pain  Recent Flowsheet Documentation  Taken 8/21/2024 0000 by Reema Prieto RN  Medication Review/Management: medications reviewed  Taken 8/20/2024 2000 by Reema Prieto RN  Medication Review/Management: medications reviewed  Intervention: Optimize Psychosocial Wellbeing  Recent Flowsheet Documentation  Taken 8/21/2024 0000 by Reema Priteo RN  Supportive Measures:   active listening utilized   goal-setting facilitated   positive reinforcement provided   verbalization of feelings encouraged  Taken 8/20/2024 2000 by Reema Prieto RN  Supportive Measures:   active listening utilized   goal-setting facilitated   positive reinforcement provided   verbalization of feelings encouraged

## 2024-08-21 NOTE — PROGRESS NOTES
Admission           -----------------------------------------------------------  Reason for admission: failure to thrive downgrade.   Primary team notified of pt arrival.  Admitted from: ICU  Via: wheelchair  Accompanied by: patient transport  Belongings: Placed by window  Admission Profile: Complete.   Access: R and L PIV   Telemetry:Placed on pt  Code Status verified on armband: yes  2 RN Skin Assessment Completed By: Writer and Jabrea JULIANN Koenig Rec completed: no  Bed surface reassessed with algorithm and charted: yes  New bed surface ordered: no  Suction/Ambu bag/Flowmeter at bedside: yes

## 2024-08-22 ENCOUNTER — APPOINTMENT (OUTPATIENT)
Dept: PHYSICAL THERAPY | Facility: CLINIC | Age: 69
DRG: 551 | End: 2024-08-22
Payer: COMMERCIAL

## 2024-08-22 ENCOUNTER — APPOINTMENT (OUTPATIENT)
Dept: OCCUPATIONAL THERAPY | Facility: CLINIC | Age: 69
DRG: 551 | End: 2024-08-22
Payer: COMMERCIAL

## 2024-08-22 LAB
ANION GAP SERPL CALCULATED.3IONS-SCNC: 8 MMOL/L (ref 7–15)
BUN SERPL-MCNC: 6.1 MG/DL (ref 8–23)
CALCIUM SERPL-MCNC: 9 MG/DL (ref 8.8–10.4)
CHLORIDE SERPL-SCNC: 104 MMOL/L (ref 98–107)
CREAT SERPL-MCNC: 0.41 MG/DL (ref 0.51–0.95)
EGFRCR SERPLBLD CKD-EPI 2021: >90 ML/MIN/1.73M2
ERYTHROCYTE [DISTWIDTH] IN BLOOD BY AUTOMATED COUNT: 13.5 % (ref 10–15)
GLUCOSE BLDC GLUCOMTR-MCNC: 119 MG/DL (ref 70–99)
GLUCOSE BLDC GLUCOMTR-MCNC: 138 MG/DL (ref 70–99)
GLUCOSE BLDC GLUCOMTR-MCNC: 145 MG/DL (ref 70–99)
GLUCOSE BLDC GLUCOMTR-MCNC: 150 MG/DL (ref 70–99)
GLUCOSE BLDC GLUCOMTR-MCNC: 218 MG/DL (ref 70–99)
GLUCOSE SERPL-MCNC: 126 MG/DL (ref 70–99)
HCO3 SERPL-SCNC: 31 MMOL/L (ref 22–29)
HCT VFR BLD AUTO: 36.7 % (ref 35–47)
HGB BLD-MCNC: 11.3 G/DL (ref 11.7–15.7)
MAGNESIUM SERPL-MCNC: 1.7 MG/DL (ref 1.7–2.3)
MCH RBC QN AUTO: 27.2 PG (ref 26.5–33)
MCHC RBC AUTO-ENTMCNC: 30.8 G/DL (ref 31.5–36.5)
MCV RBC AUTO: 88 FL (ref 78–100)
PHOSPHATE SERPL-MCNC: 3 MG/DL (ref 2.5–4.5)
PLATELET # BLD AUTO: 316 10E3/UL (ref 150–450)
POTASSIUM SERPL-SCNC: 4.4 MMOL/L (ref 3.4–5.3)
RBC # BLD AUTO: 4.15 10E6/UL (ref 3.8–5.2)
SODIUM SERPL-SCNC: 143 MMOL/L (ref 135–145)
WBC # BLD AUTO: 6.6 10E3/UL (ref 4–11)

## 2024-08-22 PROCEDURE — 85027 COMPLETE CBC AUTOMATED: CPT | Performed by: HOSPITALIST

## 2024-08-22 PROCEDURE — 250N000013 HC RX MED GY IP 250 OP 250 PS 637: Performed by: HOSPITALIST

## 2024-08-22 PROCEDURE — 97530 THERAPEUTIC ACTIVITIES: CPT | Mod: GP | Performed by: PHYSICAL THERAPIST

## 2024-08-22 PROCEDURE — 97535 SELF CARE MNGMENT TRAINING: CPT | Mod: GO

## 2024-08-22 PROCEDURE — 94640 AIRWAY INHALATION TREATMENT: CPT | Mod: 76

## 2024-08-22 PROCEDURE — 250N000009 HC RX 250

## 2024-08-22 PROCEDURE — 999N000157 HC STATISTIC RCP TIME EA 10 MIN

## 2024-08-22 PROCEDURE — 250N000013 HC RX MED GY IP 250 OP 250 PS 637: Performed by: STUDENT IN AN ORGANIZED HEALTH CARE EDUCATION/TRAINING PROGRAM

## 2024-08-22 PROCEDURE — 80048 BASIC METABOLIC PNL TOTAL CA: CPT | Performed by: HOSPITALIST

## 2024-08-22 PROCEDURE — 83735 ASSAY OF MAGNESIUM: CPT | Performed by: HOSPITALIST

## 2024-08-22 PROCEDURE — 84100 ASSAY OF PHOSPHORUS: CPT | Performed by: STUDENT IN AN ORGANIZED HEALTH CARE EDUCATION/TRAINING PROGRAM

## 2024-08-22 PROCEDURE — 36415 COLL VENOUS BLD VENIPUNCTURE: CPT | Performed by: HOSPITALIST

## 2024-08-22 PROCEDURE — 120N000002 HC R&B MED SURG/OB UMMC

## 2024-08-22 PROCEDURE — 94640 AIRWAY INHALATION TREATMENT: CPT

## 2024-08-22 PROCEDURE — 250N000013 HC RX MED GY IP 250 OP 250 PS 637

## 2024-08-22 PROCEDURE — 250N000013 HC RX MED GY IP 250 OP 250 PS 637: Performed by: PHYSICIAN ASSISTANT

## 2024-08-22 PROCEDURE — 99232 SBSQ HOSP IP/OBS MODERATE 35: CPT | Performed by: STUDENT IN AN ORGANIZED HEALTH CARE EDUCATION/TRAINING PROGRAM

## 2024-08-22 RX ORDER — OXYCODONE HYDROCHLORIDE 5 MG/1
5 TABLET ORAL EVERY 6 HOURS PRN
Status: DISCONTINUED | OUTPATIENT
Start: 2024-08-22 | End: 2024-08-26 | Stop reason: HOSPADM

## 2024-08-22 RX ADMIN — LOSARTAN POTASSIUM 50 MG: 50 TABLET, FILM COATED ORAL at 08:33

## 2024-08-22 RX ADMIN — PREGABALIN 200 MG: 100 CAPSULE ORAL at 19:58

## 2024-08-22 RX ADMIN — LEVALBUTEROL HYDROCHLORIDE 1.25 MG: 1.25 SOLUTION RESPIRATORY (INHALATION) at 07:47

## 2024-08-22 RX ADMIN — DULOXETINE HYDROCHLORIDE 30 MG: 30 CAPSULE, DELAYED RELEASE ORAL at 08:33

## 2024-08-22 RX ADMIN — OMEPRAZOLE 20 MG: 20 CAPSULE, DELAYED RELEASE ORAL at 08:33

## 2024-08-22 RX ADMIN — DULOXETINE HYDROCHLORIDE 30 MG: 30 CAPSULE, DELAYED RELEASE ORAL at 19:58

## 2024-08-22 RX ADMIN — ACETAMINOPHEN 975 MG: 325 TABLET ORAL at 06:36

## 2024-08-22 RX ADMIN — INSULIN ASPART 1 UNITS: 100 INJECTION, SOLUTION INTRAVENOUS; SUBCUTANEOUS at 17:47

## 2024-08-22 RX ADMIN — POLYETHYLENE GLYCOL 400 AND PROPYLENE GLYCOL 1 DROP: 4; 3 SOLUTION/ DROPS OPHTHALMIC at 16:45

## 2024-08-22 RX ADMIN — OXYCODONE HYDROCHLORIDE 5 MG: 5 TABLET ORAL at 06:36

## 2024-08-22 RX ADMIN — POLYETHYLENE GLYCOL 400 AND PROPYLENE GLYCOL 1 DROP: 4; 3 SOLUTION/ DROPS OPHTHALMIC at 12:38

## 2024-08-22 RX ADMIN — APIXABAN 5 MG: 5 TABLET, FILM COATED ORAL at 08:33

## 2024-08-22 RX ADMIN — FLUTICASONE PROPIONATE 1 SPRAY: 50 SPRAY, METERED NASAL at 08:33

## 2024-08-22 RX ADMIN — FLUTICASONE FUROATE AND VILANTEROL TRIFENATATE 1 PUFF: 200; 25 POWDER RESPIRATORY (INHALATION) at 08:33

## 2024-08-22 RX ADMIN — CARBOXYMETHYLCELLULOSE SODIUM 1 DROP: 10 GEL OPHTHALMIC at 12:37

## 2024-08-22 RX ADMIN — PREGABALIN 200 MG: 100 CAPSULE ORAL at 08:33

## 2024-08-22 RX ADMIN — ATORVASTATIN CALCIUM 10 MG: 10 TABLET, FILM COATED ORAL at 08:33

## 2024-08-22 RX ADMIN — LIDOCAINE 2 PATCH: 4 PATCH TOPICAL at 19:56

## 2024-08-22 RX ADMIN — POLYETHYLENE GLYCOL 400 AND PROPYLENE GLYCOL 1 DROP: 4; 3 SOLUTION/ DROPS OPHTHALMIC at 20:16

## 2024-08-22 RX ADMIN — OXYCODONE HYDROCHLORIDE 5 MG: 5 TABLET ORAL at 19:58

## 2024-08-22 RX ADMIN — OXYCODONE HYDROCHLORIDE 5 MG: 5 TABLET ORAL at 12:37

## 2024-08-22 RX ADMIN — POLYETHYLENE GLYCOL 400 AND PROPYLENE GLYCOL 1 DROP: 4; 3 SOLUTION/ DROPS OPHTHALMIC at 08:33

## 2024-08-22 RX ADMIN — CETIRIZINE HYDROCHLORIDE 10 MG: 10 TABLET, FILM COATED ORAL at 08:33

## 2024-08-22 RX ADMIN — ACETAMINOPHEN 975 MG: 325 TABLET ORAL at 12:37

## 2024-08-22 RX ADMIN — UMECLIDINIUM 1 PUFF: 62.5 AEROSOL, POWDER ORAL at 08:33

## 2024-08-22 RX ADMIN — ASPIRIN 81 MG: 81 TABLET, COATED ORAL at 08:33

## 2024-08-22 RX ADMIN — CARBOXYMETHYLCELLULOSE SODIUM 1 DROP: 10 GEL OPHTHALMIC at 16:45

## 2024-08-22 RX ADMIN — LEVALBUTEROL HYDROCHLORIDE 1.25 MG: 1.25 SOLUTION RESPIRATORY (INHALATION) at 21:25

## 2024-08-22 RX ADMIN — CARBOXYMETHYLCELLULOSE SODIUM 1 DROP: 10 GEL OPHTHALMIC at 19:58

## 2024-08-22 RX ADMIN — DILTIAZEM HYDROCHLORIDE 60 MG: 60 CAPSULE, EXTENDED RELEASE ORAL at 08:33

## 2024-08-22 RX ADMIN — FLUTICASONE PROPIONATE 1 SPRAY: 50 SPRAY, METERED NASAL at 20:16

## 2024-08-22 RX ADMIN — APIXABAN 5 MG: 5 TABLET, FILM COATED ORAL at 19:58

## 2024-08-22 RX ADMIN — CARBOXYMETHYLCELLULOSE SODIUM 1 DROP: 10 GEL OPHTHALMIC at 08:33

## 2024-08-22 RX ADMIN — CYANOCOBALAMIN TAB 500 MCG 500 MCG: 500 TAB at 08:33

## 2024-08-22 ASSESSMENT — ACTIVITIES OF DAILY LIVING (ADL)
ADLS_ACUITY_SCORE: 28
ADLS_ACUITY_SCORE: 33
ADLS_ACUITY_SCORE: 28
ADLS_ACUITY_SCORE: 33
ADLS_ACUITY_SCORE: 28

## 2024-08-22 NOTE — PLAN OF CARE
[id number]    Goal Outcome Evaluation:      Plan of Care Reviewed With: patient    Overall Patient Progress: no changeOverall Patient Progress: no change    Outcome Evaluation: pt a/o x4 with intermitten confusion, on room air. denied pain. no acute concerns      Problem: Adult Inpatient Plan of Care  Goal: Absence of Hospital-Acquired Illness or Injury  Intervention: Identify and Manage Fall Risk  Recent Flowsheet Documentation  Taken 8/22/2024 0432 by Luis Carlos Garcia RN  Safety Promotion/Fall Prevention:   activity supervised   assistive device/personal items within reach   clutter free environment maintained   increased rounding and observation   increase visualization of patient   lighting adjusted   mobility aid in reach   nonskid shoes/slippers when out of bed   patient and family education   room organization consistent   safety round/check completed   supervised activity   treat reversible contributory factors     Problem: Adult Inpatient Plan of Care  Goal: Absence of Hospital-Acquired Illness or Injury  Intervention: Prevent Skin Injury  Recent Flowsheet Documentation  Taken 8/22/2024 0432 by Luis Carlos Garcia RN  Body Position: position changed independently     Problem: Fall Injury Risk  Goal: Absence of Fall and Fall-Related Injury  Intervention: Promote Injury-Free Environment  Recent Flowsheet Documentation  Taken 8/22/2024 0432 by Luis Carlos Garcia RN  Safety Promotion/Fall Prevention:   activity supervised   assistive device/personal items within reach   clutter free environment maintained   increased rounding and observation   increase visualization of patient   lighting adjusted   mobility aid in reach   nonskid shoes/slippers when out of bed   patient and family education   room organization consistent   safety round/check completed   supervised activity   treat reversible contributory factors

## 2024-08-22 NOTE — PLAN OF CARE
"Goal Outcome Evaluation:    VS: BP (!) 142/62 (BP Location: Right arm)   Pulse 86   Temp 98.1  F (36.7  C) (Oral)   Resp 17   Ht 1.676 m (5' 6\")   Wt 96.8 kg (213 lb 6.1 oz)   SpO2 94%   BMI 34.44 kg/m     O2: >95% on 2 L nasal cannula    Output: Purewick in place    Activity: Assist x1 with gait belt and walker    Up for meals? Yes   Skin: Intact    Pain: Denies pain    CMS: Intact, denies numbness and tingling, A/Ox4   Dressing: N/a    Diet: Moderate consistent carb   LDA: L and R PIV    Equipment: IV pump and pole, personal belongings, call light within reach    Plan: TBD, continue POC         Plan of Care Reviewed With: patient    Overall Patient Progress: improvingOverall Patient Progress: improving           "

## 2024-08-22 NOTE — PROGRESS NOTES
Care Management Discharge Note     Discharge Date: 08/22/24     Discharge Disposition: Transitional Care     [facility]  [address] [place] [address]  Phone: [phone]  [facility] Liaison - Melissa Mireles  P: [phone]  F: [phone]  Email: [email or web address]     Discharge Services: Post Acute Therapies     Discharge DME: SNF to provide all necessary DME     Discharge Transportation: [facility] (P: [phone]) stretcher ride scheduled for 8/23 with window of 10:45 am - 11:30 am. Patient agreeable to possible out of pocket costs.     Private pay costs discussed: Transportation costs and insurance costs co-pays     Does the patient's insurance plan have a 3 day qualifying hospital stay waiver? No      PAS Confirmation Code:  [id number]     Patient/family educated on Medicare website which has current facility and service quality ratings: Yes     Education Provided on the Discharge Plan: Yes    Persons Notified of Discharge Plans: Bedside Nurse, Charge Nurse, Patient, MD, TCU Staff    Patient/Family in Agreement with the Plan: Yes     Handoff Referral Completed: Yes     Additional Information:    IMM completed, documented in Epic, faxed to HIM and original in chart. Copy provided to patient. Patient to discharge to [facility] via [facility] wheelchair transportation tomorrow, 8/23 between 10:45 am and 11:30 am. Patient is aware of the possible costs associated with transport and is in agreement. Discharge orders to be faxed to facility at [phone] once completed. Nurse to nurse report is requested by calling [phone]. PAS completed. Hand off to Primary Care Physician completed.     DELFIN Alegria, MSW  6th Floor Medical Surgical Unit  Phone [phone]      Message me securely in [employer]

## 2024-08-22 NOTE — PROGRESS NOTES
[facility]    Medicine Progress Note - Hospitalist Service, GOLD TEAM 22    Date of Admission:  8/14/2024    Assessment & Plan   Jenn Aparicio is a 69 year old female with a history of O2 dependent COPD (2L O2 @ rest and 3L with exertion), Lung adenocarcinoma s/p RLL lobectomy (2/2016), SBRT to RUL nodule (1/2020) DMII c/b peripheral neuropathy, and HTN, HLD, who was initially admitted to [facility] on 8/14/2024 for further evaluation and treatment of acute worsening back pain, left ankle pain and difficulty walking. Patient required admission to the ICU 8/16 due to new onset a fib with RVR and subsequent hypotension requiring pressors. Patient stabilized and appropriate for transition to the floor with medicine as primary on 8/18.     Changes Today:  - Discontinue diltiazem to 60 mg Q12H, remains in sinus  - Repeat bilateral LE doppler with interval onset of swelling and left popliteal tenderness obtained on 8/21 and largely reassuring       A fib with RVR  Hypotension, resolved  Patient developed new onset tachycardia as well as mild hypotension. EKG ordered and confirm a fib. A rapid response was subsequently called and patient received one dose of IV metoprolol with modest improvement in heart rate. Was started on amiodarone drip as well as Norepinephrine due to ongoing a fib and hypotension.  Unclear exact etiology behind a fib, no prior history. Question if due to hypovolemia as patient also noted to have new DARIO, but question if also related to known malignancy. Echo unremarkable. Patient converted back to NSR early AM on 8/17 and was able to be weaned off Norepinephrine late 8/17. Started on oral metoprolol BID and amiodarone discontinued. Due to persistence, switched  to Diltiazem 30 mg q6h and switching to long acting - 120 mg Q daily (8/19) with overlap  -  ANTONINA?DS?-VASc score of 4, and started on heparin low intensity with transition to Eliquis   - Continue tele for  now, follow BMP, magnesium levels and target potassium >4, magnesium >2. Currently in sinus on tele  - Resume losartan on 8/22  - Cardiology follow-up on discharge (order placed in navigator)    DARIO, resolved  Patient noted to have DARIO on 8/16 after being found to have new onset a fib. Felt likely to be pre-renal in the setting of poor oral intake.   - resolved with IVF    Acute on chronic low back pain:    History of chronic low back pain and imaging from 6/26/24 showed possible metastatic lesion. PTA Cymbalta lyrica. Reported increase in symptoms on presentation. MRI of lumbar spine completed and found to evidence of metastatic disease, but did showed multilevel degenerative disease. Case discussed with neurosurgery who recommended outpatient follow-up.  - Pain management  - Continue PTA Medications  - Lidocaine patch PRN  - Tylenol TID scheduled  - Flexeril TID PRN  - Oxycodone 5-10 mg q4h PRN  - Dilaudid 0.3 mg IV q4h PRN-- discontinue IV opioids  - Follow up with neurosurgery as outpatient     Left ankle pain and swelling  Difficulty ambulating  Presented with ~ 3 days hx of left ankle pain and swelling with inability ambulate.  Xray on admission w/o acute osseus abnormality, though with soft tissue swelling over dorsum of foot and medial ankle. LLE US w/o DVT. Possible sprain vs infection vs other. Ortho service consulted and felt no acute intervention need. No concerns of septic arthritis and conservative management planned.  - Pain management as above  - PT/OT  - Fall precautions     Severe O2 dependent COPD:   Chronic Hypoxic respiratory failure  Baseline O2 requirements of 2L at rest and 3L w/ exertion, currently on 2L.  Home medications include: PRN albuterol, duoneb, mucomyst, Trelegy ellipta, and Roflumilast.  PFT s 4/21/23. FEV1 0.55 (24%), FVC 1.36 (46 %), DLCOunc 3.68 (17%). Of note, roflumilast held on admission as patient reported taking only PRN and continues to be on hold given it can  "precipitate A fib.   - oxygen to keep O2 Saturation 90-92%  - Continue PTA medications (except Roflumilast)  - Follow up with Pulmonology as scheduled     Poorly controlled DMII  Hgb A1C (8.7- 7/9/24). Home medications include jaridance and weekly tirzepatide.   - HOLD PTA medications  - LSSI TID with meals and qhs  - MOD CHO diet  - BG checks TID with meals and qhs      HTN  Blood pressure mildly elevated  on admission at 150/84.  Home medications include losartan. This medication was held due to hypotension as noted above.   - Continue to hold PTA losartan for now     NSCLC, adenocarcinoma- RUL,RLL  S/p RLL lobectomy (2/2016)   Dx 1/2015. STAGE: IA2 [id number] poorly diff adeno RLL, then [id number] IA2 suspected NSCLC RUL, medically inoperable S/p RLL lobectomy (2/2016) and SBRT to RUL nodule (1/2020). Follows with Dr. Leung with recent clinic visit 7/29/24 w/ plan for 3 months follow up.  - Follow up with Oncology as scheduled    GERD Continue PTA PPI.           Diet: Moderate Consistent Carb (60 g CHO per Meal) Diet  Diet  Snacks/Supplements Adult: Glucerna; With Meals    DVT Prophylaxis: Heparin SQ  Andrade Catheter: Not present  Lines: None     Cardiac Monitoring: ACTIVE order. Indication: Tachyarrhythmias, acute (48 hours)  Code Status: Full Code      Clinically Significant Risk Factors              # Hypoalbuminemia: Lowest albumin = 3.4 g/dL at 8/16/2024 11:21 PM, will monitor as appropriate              # DMII: A1C = 8.7 % (Ref range: <5.7 %) within past 6 months   # Obesity: Estimated body mass index is 34.44 kg/m  as calculated from the following:    Height as of this encounter: 1.676 m (5' 6\").    Weight as of this encounter: 96.8 kg (213 lb 6.1 oz).        # Financial/Environmental Concerns: none               Disposition Plan     Medically Ready for Discharge: Anticipated in 2-4 Days           The patient's care was discussed with the Patient.    Nova Tuttle MD  Hospitalist Service, [facility] " TEAM 22  [facility]  Securely message with [employer] (more info)  Text page via [employer] Paging/Directory   See signed in provider for up to date coverage information  ______________________________________________________________________    Interval History     Reports improving back pain she associates with the bed. Denies palpitations, chest pain, shortness of breath. Cough has continued to improve. No additional concerns.    Up and moving around with nursing earlier in the day without dyspnea or significant tachycardia. Appetite adequate. Moving bowels.    Physical Exam   Vital Signs: Temp: 98  F (36.7  C) Temp src: Oral BP: 123/61 Pulse: 86   Resp: 18 SpO2: 94 % O2 Device: Nasal cannula Oxygen Delivery: 2 LPM  Weight: 213 lbs 6.1 oz  General Appearance: Comfortable, nontoxic appearing female seen laying in bed.  Eyes: PERRLA.  No conjunctival icterus.  HEENT: Atraumatic.  Respiratory: Breathing comfortably on supplemental oxygen.  Cardiovascular: normal rate, no murmurs  Lymph/Hematologic: No bruising on exposed skin.  Skin: No lesions or rashes noted on exposed skin.  Musculoskeletal: Moving all extremities spontaneously.  Neurologic: Cranial nerves II through XII grossly intact.  Psychiatric: Mood appropriate    Medical Decision Making       55 MINUTES SPENT BY ME on the date of service doing chart review, history, exam, documentation & further activities per the note.      Data   Imaging results reviewed over the past 24 hrs:   Recent Results (from the past 24 hour(s))   US Lower Extremity Venous Duplex Bilateral    Narrative    EXAMINATION: DOPPLER VENOUS ULTRASOUND OF BILATERAL LOWER EXTREMITIES,  8/21/2024 5:41 PM     COMPARISON: 12/8/2021    HISTORY: Lower extremity swelling, assess for clot    TECHNIQUE:  Gray-scale evaluation with compression, spectral flow and  color Doppler assessment of the deep venous system of both legs from  groin to knee, and then at the  ankles.    FINDINGS:  In both lower extremities, the common femoral, superficial femoral,  deep profunda, popliteal, posterior tibial, and peroneal veins  demonstrate normal compressibility and blood flow. There appears to be  a focal vessel at the level of the left mid calf that is  noncompressible and cannot be followed proximally by the technologist.  After further discussion, the technologist thinks this may represent a  small  branch, as it does not have an additional apparent  vein such as the gastrocnemius.      Impression    IMPRESSION:  No definite evidence of deep venous thrombosis in either lower  extremity. At the level of the left mid calf there is a focal vessel  that appears noncompressible. It is unclear if this represents a small  perforating branch, although it cannot be followed proximally and does  not represent one of the distal deep veins such as the posterior  tibial or gastrocnemius. Could consider short-term follow-up  assessment to document stability/resolution.    I have personally reviewed the examination and initial interpretation  and I agree with the findings.    LUIS ANTONIO MEDEL DO         SYSTEM ID:  [id number]     Recent Labs   Lab 08/22/24  1334 08/22/24  0804 08/22/24  0730 08/21/24  0833 08/21/24  0526 08/20/24  0811 08/20/24  0632 08/17/24  0103 08/16/24  2321   WBC  --   --  6.6  --  7.3  --  7.3   < >  --    HGB  --   --  11.3*  --  10.9*  --  10.6*   < >  --    MCV  --   --  88  --  89  --  87   < >  --    PLT  --   --  316  --  272  --  256   < >  --    NA  --   --  143  --  141  --  139   < > 132*   POTASSIUM  --   --  4.4  --  3.8  --  4.5   < > 3.5   CHLORIDE  --   --  104  --  102  --  103   < > 97*   CO2  --   --  31*  --  32*  --  30*   < > 23   BUN  --   --  6.1*  --  4.9*  --  6.2*   < > 13.9   CR  --   --  0.41*  --  0.40*  --  0.52   < > 1.18*   ANIONGAP  --   --  8  --  7  --  6*   < > 12   LUIGI  --   --  9.0  --  8.4*  --  8.7*   < > 8.7*   * 119*  126*   < > 121*   < > 105*   < > 171*   ALBUMIN  --   --   --   --   --   --   --   --  3.4*   PROTTOTAL  --   --   --   --   --   --   --   --  6.8   BILITOTAL  --   --   --   --   --   --   --   --  0.6   ALKPHOS  --   --   --   --   --   --   --   --  89   ALT  --   --   --   --   --   --   --   --  6   AST  --   --   --   --   --   --   --   --  21    < > = values in this interval not displayed.

## 2024-08-23 ENCOUNTER — APPOINTMENT (OUTPATIENT)
Dept: GENERAL RADIOLOGY | Facility: CLINIC | Age: 69
DRG: 551 | End: 2024-08-23
Attending: STUDENT IN AN ORGANIZED HEALTH CARE EDUCATION/TRAINING PROGRAM
Payer: COMMERCIAL

## 2024-08-23 ENCOUNTER — APPOINTMENT (OUTPATIENT)
Dept: CT IMAGING | Facility: CLINIC | Age: 69
DRG: 551 | End: 2024-08-23
Attending: STUDENT IN AN ORGANIZED HEALTH CARE EDUCATION/TRAINING PROGRAM
Payer: COMMERCIAL

## 2024-08-23 LAB
ANION GAP SERPL CALCULATED.3IONS-SCNC: 4 MMOL/L (ref 7–15)
BUN SERPL-MCNC: 6.8 MG/DL (ref 8–23)
CALCIUM SERPL-MCNC: 8.8 MG/DL (ref 8.8–10.4)
CHLORIDE SERPL-SCNC: 99 MMOL/L (ref 98–107)
CREAT SERPL-MCNC: 0.5 MG/DL (ref 0.51–0.95)
EGFRCR SERPLBLD CKD-EPI 2021: >90 ML/MIN/1.73M2
ERYTHROCYTE [DISTWIDTH] IN BLOOD BY AUTOMATED COUNT: 13.7 % (ref 10–15)
GLUCOSE BLDC GLUCOMTR-MCNC: 115 MG/DL (ref 70–99)
GLUCOSE BLDC GLUCOMTR-MCNC: 119 MG/DL (ref 70–99)
GLUCOSE BLDC GLUCOMTR-MCNC: 136 MG/DL (ref 70–99)
GLUCOSE BLDC GLUCOMTR-MCNC: 154 MG/DL (ref 70–99)
GLUCOSE BLDC GLUCOMTR-MCNC: 182 MG/DL (ref 70–99)
GLUCOSE SERPL-MCNC: 109 MG/DL (ref 70–99)
HCO3 SERPL-SCNC: 37 MMOL/L (ref 22–29)
HCT VFR BLD AUTO: 37.2 % (ref 35–47)
HGB BLD-MCNC: 12 G/DL (ref 11.7–15.7)
MAGNESIUM SERPL-MCNC: 2.2 MG/DL (ref 1.7–2.3)
MCH RBC QN AUTO: 28.9 PG (ref 26.5–33)
MCHC RBC AUTO-ENTMCNC: 32.3 G/DL (ref 31.5–36.5)
MCV RBC AUTO: 90 FL (ref 78–100)
PHOSPHATE SERPL-MCNC: 4 MG/DL (ref 2.5–4.5)
PLATELET # BLD AUTO: 330 10E3/UL (ref 150–450)
POTASSIUM SERPL-SCNC: 4.6 MMOL/L (ref 3.4–5.3)
RBC # BLD AUTO: 4.15 10E6/UL (ref 3.8–5.2)
SODIUM SERPL-SCNC: 140 MMOL/L (ref 135–145)
WBC # BLD AUTO: 7.4 10E3/UL (ref 4–11)

## 2024-08-23 PROCEDURE — 84100 ASSAY OF PHOSPHORUS: CPT | Performed by: STUDENT IN AN ORGANIZED HEALTH CARE EDUCATION/TRAINING PROGRAM

## 2024-08-23 PROCEDURE — 94640 AIRWAY INHALATION TREATMENT: CPT

## 2024-08-23 PROCEDURE — 70450 CT HEAD/BRAIN W/O DYE: CPT | Mod: 26 | Performed by: RADIOLOGY

## 2024-08-23 PROCEDURE — 85014 HEMATOCRIT: CPT | Performed by: HOSPITALIST

## 2024-08-23 PROCEDURE — 82374 ASSAY BLOOD CARBON DIOXIDE: CPT | Performed by: HOSPITALIST

## 2024-08-23 PROCEDURE — 83735 ASSAY OF MAGNESIUM: CPT | Performed by: HOSPITALIST

## 2024-08-23 PROCEDURE — 120N000002 HC R&B MED SURG/OB UMMC

## 2024-08-23 PROCEDURE — 94640 AIRWAY INHALATION TREATMENT: CPT | Mod: 76

## 2024-08-23 PROCEDURE — 250N000013 HC RX MED GY IP 250 OP 250 PS 637: Performed by: STUDENT IN AN ORGANIZED HEALTH CARE EDUCATION/TRAINING PROGRAM

## 2024-08-23 PROCEDURE — 250N000013 HC RX MED GY IP 250 OP 250 PS 637: Performed by: HOSPITALIST

## 2024-08-23 PROCEDURE — 250N000009 HC RX 250

## 2024-08-23 PROCEDURE — 250N000013 HC RX MED GY IP 250 OP 250 PS 637: Performed by: PHYSICIAN ASSISTANT

## 2024-08-23 PROCEDURE — 999N000157 HC STATISTIC RCP TIME EA 10 MIN

## 2024-08-23 PROCEDURE — 99232 SBSQ HOSP IP/OBS MODERATE 35: CPT | Performed by: STUDENT IN AN ORGANIZED HEALTH CARE EDUCATION/TRAINING PROGRAM

## 2024-08-23 PROCEDURE — 70450 CT HEAD/BRAIN W/O DYE: CPT

## 2024-08-23 PROCEDURE — 250N000013 HC RX MED GY IP 250 OP 250 PS 637

## 2024-08-23 PROCEDURE — 36415 COLL VENOUS BLD VENIPUNCTURE: CPT | Performed by: HOSPITALIST

## 2024-08-23 PROCEDURE — 73610 X-RAY EXAM OF ANKLE: CPT | Mod: LT

## 2024-08-23 RX ORDER — BISACODYL 5 MG/1
TABLET, DELAYED RELEASE ORAL
Qty: 4 TABLET | Refills: 0 | Status: SHIPPED | OUTPATIENT
Start: 2024-08-23 | End: 2024-10-02

## 2024-08-23 RX ADMIN — DULOXETINE HYDROCHLORIDE 30 MG: 30 CAPSULE, DELAYED RELEASE ORAL at 21:39

## 2024-08-23 RX ADMIN — CYANOCOBALAMIN TAB 500 MCG 500 MCG: 500 TAB at 08:27

## 2024-08-23 RX ADMIN — CETIRIZINE HYDROCHLORIDE 10 MG: 10 TABLET, FILM COATED ORAL at 08:27

## 2024-08-23 RX ADMIN — OXYCODONE HYDROCHLORIDE 5 MG: 5 TABLET ORAL at 08:39

## 2024-08-23 RX ADMIN — INSULIN ASPART 1 UNITS: 100 INJECTION, SOLUTION INTRAVENOUS; SUBCUTANEOUS at 13:35

## 2024-08-23 RX ADMIN — ASPIRIN 81 MG: 81 TABLET, COATED ORAL at 08:25

## 2024-08-23 RX ADMIN — APIXABAN 5 MG: 5 TABLET, FILM COATED ORAL at 08:28

## 2024-08-23 RX ADMIN — LEVALBUTEROL HYDROCHLORIDE 1.25 MG: 1.25 SOLUTION RESPIRATORY (INHALATION) at 21:12

## 2024-08-23 RX ADMIN — PREGABALIN 200 MG: 100 CAPSULE ORAL at 21:39

## 2024-08-23 RX ADMIN — ATORVASTATIN CALCIUM 10 MG: 10 TABLET, FILM COATED ORAL at 08:27

## 2024-08-23 RX ADMIN — CARBOXYMETHYLCELLULOSE SODIUM 1 DROP: 10 GEL OPHTHALMIC at 13:43

## 2024-08-23 RX ADMIN — DULOXETINE HYDROCHLORIDE 30 MG: 30 CAPSULE, DELAYED RELEASE ORAL at 08:26

## 2024-08-23 RX ADMIN — POLYETHYLENE GLYCOL 400 AND PROPYLENE GLYCOL 1 DROP: 4; 3 SOLUTION/ DROPS OPHTHALMIC at 08:44

## 2024-08-23 RX ADMIN — CARBOXYMETHYLCELLULOSE SODIUM 1 DROP: 10 GEL OPHTHALMIC at 08:28

## 2024-08-23 RX ADMIN — APIXABAN 5 MG: 5 TABLET, FILM COATED ORAL at 21:39

## 2024-08-23 RX ADMIN — PREGABALIN 200 MG: 100 CAPSULE ORAL at 08:25

## 2024-08-23 RX ADMIN — OMEPRAZOLE 20 MG: 20 CAPSULE, DELAYED RELEASE ORAL at 08:25

## 2024-08-23 RX ADMIN — POLYETHYLENE GLYCOL 400 AND PROPYLENE GLYCOL 1 DROP: 4; 3 SOLUTION/ DROPS OPHTHALMIC at 13:36

## 2024-08-23 RX ADMIN — ACETAMINOPHEN 975 MG: 325 TABLET ORAL at 08:39

## 2024-08-23 RX ADMIN — LOSARTAN POTASSIUM 50 MG: 50 TABLET, FILM COATED ORAL at 08:25

## 2024-08-23 RX ADMIN — LEVALBUTEROL HYDROCHLORIDE 1.25 MG: 1.25 SOLUTION RESPIRATORY (INHALATION) at 08:48

## 2024-08-23 RX ADMIN — CARBOXYMETHYLCELLULOSE SODIUM 1 DROP: 10 GEL OPHTHALMIC at 21:38

## 2024-08-23 RX ADMIN — FLUTICASONE PROPIONATE 1 SPRAY: 50 SPRAY, METERED NASAL at 08:43

## 2024-08-23 RX ADMIN — UMECLIDINIUM 1 PUFF: 62.5 AEROSOL, POWDER ORAL at 08:43

## 2024-08-23 RX ADMIN — FLUTICASONE PROPIONATE 1 SPRAY: 50 SPRAY, METERED NASAL at 21:39

## 2024-08-23 RX ADMIN — POLYETHYLENE GLYCOL 400 AND PROPYLENE GLYCOL 1 DROP: 4; 3 SOLUTION/ DROPS OPHTHALMIC at 21:40

## 2024-08-23 RX ADMIN — FLUTICASONE FUROATE AND VILANTEROL TRIFENATATE 1 PUFF: 200; 25 POWDER RESPIRATORY (INHALATION) at 08:45

## 2024-08-23 RX ADMIN — OXYCODONE HYDROCHLORIDE 5 MG: 5 TABLET ORAL at 21:46

## 2024-08-23 ASSESSMENT — ACTIVITIES OF DAILY LIVING (ADL)
ADLS_ACUITY_SCORE: 32
ADLS_ACUITY_SCORE: 33
ADLS_ACUITY_SCORE: 32
ADLS_ACUITY_SCORE: 33

## 2024-08-23 NOTE — PROGRESS NOTES
Care Management Follow Up    Length of Stay (days): 9    Expected Discharge Date: 8/26/24     Concerns to be Addressed: Discharge Planning       Patient plan of care discussed at interdisciplinary rounds: Yes    Anticipated Discharge Disposition: Transitional Care     Anticipated Discharge Services: Post Acute Therapies    Anticipated Discharge DME: SNF to provide all necessary DME    Patient/family educated on Medicare website which has current facility and service quality ratings: Yes    Education Provided on the Discharge Plan: Yes    Patient/Family in Agreement with the Plan: Yes    Referrals Placed by CM/SW: Post Acute Facilities    Private pay costs discussed: Not applicable    Additional Information:    Discharge to TCU at [facility] was scheduled for today, but postponed due to patient having side pain. Writer cancelled the transportation and updated facility and patient. Writer will continue to follow to coordinate discharge when patient is medically stable tor discharge.    DELFIN Alegria, MSW  6th Floor Medical Surgical Unit  Phone [phone]      Message me securely in [employer]

## 2024-08-23 NOTE — PROGRESS NOTES
[facility]       NEUROSURGERY CONSULTATION NOTE    This consultation was requested by Dr. Tuttle from the [facility] Team 22 service.    Reason for Consultation: Left lower extremity radiculopathy & weakness    HPI: Jenn Aparicio is a 69 year old female with a complex past medical history, notable for O2 dependent COPD, Lung adenocarcinoma s/p RLL lobectomy (2/2016), SBRT to RUL nodule (1/2020) DMII c/b peripheral neuropathy, and HTN, HLD, who was initially admitted to [facility] on 8/14/2024 for further evaluation and treatment of acute worsening back pain, left ankle pain and difficulty walking. Patient required admission to the ICU 8/16 due to new onset a fib with RVR (started on low intensity heparin, and transitioned to Eliquis) and subsequent hypotension requiring pressors. Patient stabilized and appropriate for transition to the floor with medicine as primary on 8/18.   Patient endorses chronic low back pain. Per chart review, there was an initial suspicion for metastatic disease to the spine, s/p L2 vertebral body lesion biopsy in June. She reports exacerbation of her low back pain following biopsy. Reports radiation into the bilateral lower extremities, but worse on the left, with numbness and tingling. States that she has had a long standing history of urinary urgency, no fecal incontinence or saddle anaesthesia reported.  Initial presentation was for concerns of left lower extremity pain, swelling, and difficulty ambulating, stated that she might have tripped on her walker and sprained her left ankle.    Neurosurgery consult placed for left lower extremity weakness and MR Lumbar imaging findings.      PAST MEDICAL HISTORY:   Past Medical History:   Diagnosis Date    Adenocarcinoma, lung (H)     Asthma     Chronic left-sided low back pain with left-sided sciatica 08/16/2024    Chronic respiratory failure with hypoxia (H) 08/16/2024    Ectopic pregnancy     Esophageal  reflux     Pulmonary emphysema (H)     Very severe FEV1<30% predicted    Type II diabetes mellitus (H)        PAST SURGICAL HISTORY:   Past Surgical History:   Procedure Laterality Date    2/8/16                R thoracotomy, RLL lobectomy (Dr. Cunha). Adenocarcinoma, 1.1 cm, assoicated with atypical adenomatous hyperplasia Right 02/08/2016    R thoracotomy, RLL lobectomy (Dr. Cunha). Adenocarcinoma, 1.1 cm, assoicated with atypical adenomatous hyperplasia    BRONCHOSCOPY, WITH BIOPSY, ROBOT ASSISTED N/A 7/19/2023    Procedure: robot assisted Ion BRONCHOSCOPY, WITH BIOPSY;  Surgeon: Patria Garcia MD;  Location:  OR    ENDOBRONCHIAL ULTRASOUND FLEXIBLE N/A 7/19/2023    Procedure: Endobronchial ultrasound flexible;  Surgeon: Patria Garcia MD;  Location:  OR    ESOPHAGOSCOPY, GASTROSCOPY, DUODENOSCOPY (EGD), COMBINED N/A 8/16/2022    Procedure: ESOPHAGOGASTRODUODENOSCOPY (EGD);  Surgeon: Travis Briseno MD;  Location: [facility]    ESOPHAGOSCOPY, GASTROSCOPY, DUODENOSCOPY (EGD), COMBINED N/A 8/8/2023    Procedure: ESOPHAGOGASTRODUODENOSCOPY, WITH BIOPSY;  Surgeon: Chao Rodrigues DO;  Location:  GI    ORTHOPEDIC SURGERY      PHACOEMULSIFICATION CLEAR CORNEA WITH STANDARD INTRAOCULAR LENS IMPLANT Left 8/24/2023    Procedure: LEFT EYE PHACOEMULSIFICATION, CATARACT, WITH INTRAOCULAR LENS IMPLANT;  Surgeon: Re Keita MD;  [place] [facility] [place]    PHACOEMULSIFICATION CLEAR CORNEA WITH STANDARD INTRAOCULAR LENS IMPLANT Right 9/5/2023    Procedure: RIGHT EYE PHACOEMULSIFICATION, CATARACT, WITH INTRAOCULAR LENS IMPLANT;  Surgeon: Re Keita MD;  [place] [facility] [place]       FAMILY HISTORY:   Family History   Problem Relation Age of Onset    Thyroid Disease Mother     Cerebrovascular Disease Mother     Hypertension Mother     Hypertension Father     Glaucoma Father     Cancer Sister     Lung Cancer Sister     Diabetes Brother     Cancer Brother     Diabetes Brother     Cancer Brother     Diabetes Brother     Deep Vein  Thrombosis (DVT) Daughter     Depression Daughter     Alcohol/Drug Other         self    Diabetes Other         self    Thyroid Disease Other         self    Asthma Other         self    Macular Degeneration No family hx of     Anesthesia Reaction No family hx of        SOCIAL HISTORY:   Social History     Tobacco Use    Smoking status: Former     Current packs/day: 0.00     Types: Cigarettes     Quit date:      Years since quittin.6     Passive exposure: Past    Smokeless tobacco: Never    Tobacco comments:     2023 Patient using 14 mg patch, wants to have prescription for Nicotrol inhaler, took workbook   Substance Use Topics    Alcohol use: Not Currently       MEDICATIONS:  Medications Prior to Admission   Medication Sig Dispense Refill Last Dose    acetylcysteine (MUCOMYST) 10 % nebulizer solution Inhale 4 mLs into the lungs 4 times daily as needed for mucolysis/respiratory distress 30 mL 5 Past Month    albuterol (PROAIR HFA/PROVENTIL HFA/VENTOLIN HFA) 108 (90 Base) MCG/ACT inhaler USE 1 OR 2 INHALATIONS EVERY 4 HOURS AS NEEDED 17 g 11 Past Month    albuterol (PROVENTIL) (2.5 MG/3ML) 0.083% neb solution Take 1 vial (2.5 mg) by nebulization 4 times daily 360 mL 5 Past Month    alpha-lipoic acid 600 MG capsule Take 1 capsule (600 mg) by mouth daily 90 capsule 3 Past Week    aspirin 81 MG EC tablet Take 1 tablet (81 mg) by mouth every morning 90 tablet 3 Past Week    atorvastatin (LIPITOR) 10 MG tablet Take 1 tablet (10 mg) by mouth daily 90 tablet 3 Past Week    carboxymethylcellulose (REFRESH LIQUIGEL) 1 % ophthalmic solution Place 1 drop into both eyes 4 times daily 15 mL 11 Past Week    cetirizine (ZYRTEC) 10 MG tablet Take 1 tablet (10 mg) by mouth every morning 90 tablet 3 Past Week    cyanocobalamin (VITAMIN B-12) 500 MCG tablet Take 1 tablet (500 mcg) by mouth daily 90 tablet 3 Past Week    cyclobenzaprine (FLEXERIL) 5 MG tablet Take 1 tablet (5 mg) by mouth 3 times daily as needed for muscle  spasms 30 tablet 1 Past Month    DULoxetine (CYMBALTA) 30 MG capsule Take 1 capsule twice a day. 180 capsule 3 Past Week    empagliflozin (JARDIANCE) 25 MG TABS tablet Take 1 tablet (25 mg) by mouth every morning 90 tablet 3 Past Week    fluticasone (FLONASE) 50 MCG/ACT nasal spray Spray 1 spray into both nostrils 2 times daily Use at night before bed 15.8 mL 3 Past Week    Fluticasone-Umeclidin-Vilanterol (TRELEGY ELLIPTA) 200-62.5-25 MCG/ACT oral inhaler USE 1 INHALATION DAILY 28 each 5 Past Week    GLUCOSAMINE-CHONDROITIN -400 MG tablet TAKE 1 TABLET DAILY (Patient taking differently: Take 1 tablet by mouth every evening) 90 tablet 3 Past Week    losartan (COZAAR) 50 MG tablet Take 1 tablet (50 mg) by mouth daily 90 tablet 3 Past Week    omega-3 acid ethyl esters (LOVAZA) 1 g capsule Take 2 capsules (2 g) by mouth 2 times daily 360 capsule 3 Past Week    omeprazole (PRILOSEC) 20 MG DR capsule Take 1 capsule (20 mg) by mouth daily (Patient taking differently: Take 20 mg by mouth 2 times daily) 90 capsule 3 Past Week    polyethylene glycol-propylene glycol (SYSTANE) 0.4-0.3 % SOLN ophthalmic solution Place 1 drop into both eyes 4 times daily 5 mL 11 Past Week    pregabalin (LYRICA) 150 MG capsule Take 1 capsule (150 mg) by mouth 2 times daily TAKE 1 CAPSULE(150 MG) BY MOUTH TWICE DAILY 180 capsule 3 Past Week    roflumilast (DALIRESP) 500 MCG TABS tablet Take 1 tablet (500 mcg) by mouth every morning 90 tablet 3 Past Week    blood glucose (ONETOUCH ULTRA) test strip        Continuous Blood Gluc  (DEXCOM G7 ) JOSEPHINE Use to read blood sugars as per 's instructions. 1 each 0     Continuous Glucose Sensor (DEXCOM G7 SENSOR) MISC Change every 10 days. 3 each 5     EPINEPHrine (ANY BX GENERIC EQUIV) 0.3 MG/0.3ML injection 2-pack Inject 0.3 mLs (0.3 mg) into the muscle as needed for anaphylaxis (related to bee stings) May repeat one time in 5-15 minutes if response to initial dose is  "inadequate. 2 each 1     tirzepatide (MOUNJARO) 5 MG/0.5ML pen Inject 5 mg Subcutaneous every 7 days 6 mL 3 8/12/2024    [DISCONTINUED] ipratropium - albuterol 0.5 mg/2.5 mg/3 mL (DUONEB) 0.5-2.5 (3) MG/3ML neb solution Take 1 vial (3 mLs) by nebulization every 6 hours as needed for shortness of breath, wheezing or cough 90 mL 1        Allergies:  Allergies   Allergen Reactions    Aspirin      325mg     Bee Venom Anaphylaxis    Penicillins Hives    Acetaminophen GI Disturbance    Azithromycin Dizziness    Colon Care     Interferons Dermatitis    Metformin GI Disturbance       ROS: 10 point ROS were all negative except for pertinent positives noted in my HPI.    Physical exam:   Blood pressure 121/50, pulse 96, temperature 98.5  F (36.9  C), temperature source Oral, resp. rate 18, height 1.676 m (5' 6\"), weight 58.9 kg (129 lb 13.6 oz), SpO2 95%, not currently breastfeeding.  CV: HR and BP as noted above  PULM: breathing comfortably on oxygen via nasal cannula  ABD: soft, non-distended  NEUROLOGIC:  -- Awake; Alert; oriented x 3  -- Follows commands briskly  -- +repetition, calculation, and naming  -- Speech fluent, spontaneous. No aphasia or dysarthria.  -- no gaze preference. No apparent hemineglect.  Cranial Nerves:  -- visual fields full to confrontation, PERRL 3-2mm bilat and brisk, extraocular movements intact  -- face symmetrical, tongue midline  -- sensory V1-V3 intact bilaterally  -- palate elevates symmetrically, uvula midline  -- hearing grossly intact bilat  -- Trapezii 5/5 strength bilat symmetric    Motor:  Normal bulk / tone; no tremor, rigidity, or bradykinesia.  No muscle wasting or fasciculations  No Pronator Drift  Left lower extremity edema appreciated, with tenderness on manipulation of the ankle     Delt Bi Tri Hand Flexion/  Extension Iliopsoas Quadriceps Hamstrings Tibialis Anterior Gastroc    C5 C6 C7 C8/T1 L2 L3 L4-S1 L4 S1   R 4+ 4+ 4+ 4+ 4+ 5 5 5 5   L 4+ 4+ 4+ 4+ 4- 4- 4- 4- 4-   Sensory: "  diminished bilateral lower extremity sensation    Reflexes:  3+ reflexes  Bilateral clonus  Jennifer's negative    Gait: Deferred      LABS:  Recent Labs   Lab 08/23/24  1756 08/23/24  1225 08/23/24  0839 08/22/24  0804 08/22/24  0730 08/21/24  0833 08/21/24  0526   NA  --   --  140  --  143  --  141   POTASSIUM  --   --  4.6  --  4.4  --  3.8   CHLORIDE  --   --  99  --  104  --  102   CO2  --   --  37*  --  31*  --  32*   ANIONGAP  --   --  4*  --  8  --  7   * 182* 109*   < > 126*   < > 121*   BUN  --   --  6.8*  --  6.1*  --  4.9*   CR  --   --  0.50*  --  0.41*  --  0.40*   LUIGI  --   --  8.8  --  9.0  --  8.4*    < > = values in this interval not displayed.       Recent Labs   Lab 08/23/24  0839   WBC 7.4   RBC 4.15   HGB 12.0   HCT 37.2   MCV 90   MCH 28.9   MCHC 32.3   RDW 13.7          IMAGING:  EXAM: MR LUMBAR SPINE W/O & W CONTRAST  8/15/2024 7:16 PM      HISTORY:  Metastatic spinal disease, with worsening low back pain,  with radiation to left leg, left leg weakness        COMPARISON:  MRI lumbar spine 6/26/2024, PET/CT 6/11/2024 and biopsy  images from 7/22/2024.     TECHNIQUE: Sagittal T1-weighted and T2-weighted and axial T2-weighted  images of the lumbar spine were obtained without intravenous contrast.  Post intravenous contrast using gadolinium axial and sagittal  T1-weighted images were obtained with fat saturation.     CONTRAST: 9.2ml gadavist.     FINDINGS:  There are 5 lumbar-type vertebrae, used for the purposes of this  dictation. No acute fractures.  The tip of the conus is approximately  at the level of L1. Normal cauda equina. No leptomeningeal  enhancement.     Stable grade 1 anterolisthesis L4 on L5. Decreased conspicuity and  enhancement of Schmorl nodes in the superior endplate of L2 with  peripheral enhancement and bone marrow edema compared with MRI from  6/26/2024. Note that this was previously biopsied and the biopsy was  negative for metastatic disease.     On a  level by level basis, the findings are as follows:  T11-12: No spinal canal or neural foraminal stenosis. No significant  degenerative changes.     T12-L1: No spinal canal or neural foraminal stenosis. No significant  degenerative changes.     L1-2: No spinal canal or neural foraminal stenosis. Mild facet  arthropathy.     L2-3: Disc bulge and facet hypertrophy with mild spinal canal  narrowing. No neural foraminal stenosis.     L3-4: Disc bulge and facet hypertrophy. Mild spinal canal narrowing.  No foraminal stenosis.     L4-5: Grade 1 anterolisthesis. Superimposed disc bulge and facet  hypertrophy. Thickening of the ligamentum flavum. Resultant moderate  spinal canal stenosis, moderate right and mild left neural foraminal  stenosis.     L5-S1: Disc bulge and facet hypertrophy. No foraminal or spinal canal  stenosis.     The visualized paraspinous soft tissues are within normal limits.      Prominence of the anterior epidural venous plexus at this L3 and L4  levels, similar to prior, likely due to anterolisthesis and spinal  canal narrowing at L4-5 level.                                                                      IMPRESSION:  1. Stable to decreased conspicuity of previously biopsy-proven mildly  inflamed Schmorl node in the superior endplate of L2 endplates. No  evidence of metastatic disease in the lumbar spine. No leptomeningeal  enhancement.  2. Multilevel degenerative findings as detailed above. Most  significantly there is grade 1 anterolisthesis, degenerative disc  disease and facet arthropathy at L4-5 resulting in moderate spinal  canal stenosis and moderate right neural foraminal stenosis. The  anterior epidural venous plexus is prominent at this level, likely due  to anterolisthesis and spinal canal narrowing.    ASSESSMENT:  69 year old female with a complex past medical history, notable for O2 dependent COPD, Lung adenocarcinoma s/p RLL lobectomy (2/2016), SBRT to RUL nodule (1/2020) DMII c/b  peripheral neuropathy, and HTN, HLD, who was initially admitted to [facility] on 8/14/2024 for further evaluation and treatment of acute worsening back pain, left ankle pain and difficulty walking. Neurosurgery consult placed for concerns of left lower extremity weakness and MRI findings.  We are unconvinced of a correlation between imaging findings and patient's acute presentation. L4-5 neural foraminal stenosis noted on imaging is worse on the right. There is no emergent/ urgent role for neurosurgical intervention at this time. Consider optimizing multiple comorbidities in the interim, outpatient neurosurgery clinic follow up will be scheduled.    RECOMMENDATIONS:  No neurosurgical intervention indicated at this time   Will schedule an outpatient neurosurgery follow up in 6 weeks   Suggest optimizing multiple comorbidities      The patient was discussed with Dr. Parker, neurosurgery chief resident, and Dr. Quiroz, neurosurgery staff, and they agree with the above.    Jose Hernandez MD, PGY-1  Department of Neurosurgery  Pager: [phone]    Please contact neurosurgery resident on call with questions.    Dial * * [phone], enter [phone] when prompted.

## 2024-08-23 NOTE — PROGRESS NOTES
CLINICAL NUTRITION SERVICES - REASSESSMENT NOTE     Nutrition Prescription    RECOMMENDATIONS FOR MDs/PROVIDERS TO ORDER:  None at present    Malnutrition Status:    Moderate malnutrition in the context of acute illness    Recommendations already ordered by Registered Dietitian (RD):  - continue current nutrition POC until about to determine whether pt is drinking glucerna.    Future/Additional Recommendations:  Follow intake, ONS acceptance, labs, GI function.      Unable to see today as with other staff.  Anticipated discharge todaybut post poned d/t side pain, R LE weakness.    EVALUATION OF THE PROGRESS TOWARD GOALS   Diet: Moderate Consistent Carbohydrate  ONS: chocolate glucerna ordered 8/15 Intake: intake noted to be usually poor 10-50%, occasionally higher, over past week.  Likely meeting < 50% of estimated needs based on meal orders and documented intake.         NEW FINDINGS   -Wt:   08/23/24 0634 58.9 kg (129 lb 13.6 oz) --presume error   08/22/24 0606 96.8 kg (213 lb 6.1 oz) Bed scale   08/20/24 2000 96.1 kg (211 lb 13.8 oz) Bed scale   08/20/24 0000 91.7 kg (202 lb 2.6 oz) Bed scale   08/19/24 0115 94.2 kg (207 lb 10.8 oz) Bed scale   08/17/24 0100 91 kg (200 lb 9.9 oz) Bed scale   08/16/24 0830 92.9 kg (204 lb 12.9 oz) --   08/15/24 0741 92 kg (202 lb 13.2 oz) Bed scale   08/14/24 2116 87.4 kg (192 lb 10.9 oz) Bed scale     Weight assessment: variable bed scale weights this admission and variable prior to admission also    -Labs:   Latest Reference Range & Units 08/16/24 07:00 08/16/24 23:21 08/17/24 02:05 08/17/24 05:56 08/17/24 11:55 08/18/24 05:55 08/19/24 06:51 08/20/24 06:32 08/21/24 05:26 08/22/24 07:30 08/23/24 08:39   Sodium 135 - 145 mmol/L 137 132 (L) 132 (L)  136 136 137 139 141 143 140   Potassium 3.4 - 5.3 mmol/L 4.1 3.5 3.6  3.9 4.1 3.8 4.5 3.8 4.4 4.6   Urea Nitrogen 8.0 - 23.0 mg/dL 9.5 13.9 15.7  15.0 11.9 6.0 (L) 6.2 (L) 4.9 (L) 6.1 (L) 6.8 (L)   Creatinine 0.51 - 0.95 mg/dL 0.49 (L)  "1.18 (H) 1.35 (H)  0.79 0.47 (L) 0.39 (L) 0.52 0.40 (L) 0.41 (L) 0.50 (L)   Magnesium 1.7 - 2.3 mg/dL  1.9 1.7 2.4 (H)  2.0  1.7 1.7 1.7 2.2   Phosphorus 2.5 - 4.5 mg/dL  1.9 (L) 2.1 (L) 3.4  2.3 (L)   2.4 (L) 3.0 4.0   Glucose 70 - 99 mg/dL 146 (H) 171 (H) 190 (H)  144 (H) 149 (H) 121 (H) 105 (H) 121 (H) 126 (H) 109 (H)   (L): Data is abnormally low  (H): Data is abnormally high    -GI: no issues noted.     -Skin: no noted wounds per RN charting.     -Meds: Vit B12, cymbalta, novolog, prilosec, miralax, senokot-s, amiodarone,  zofran prn, oxycodone prn     MALNUTRITION  % Intake: </= 50% for >/= 5 days (severe)  % Weight Loss: Unable to assess-variable bed scale weights.  Subcutaneous Fat Loss: None observed per 8/17 RD assessment  Muscle Loss: None observed per 8/17 RD assessment  Fluid Accumulation/Edema: Mild  Malnutrition Diagnosis: Moderate malnutrition in the context of acute illness    Previous Goals   Patient to consume % of nutritionally adequate meal trays TID, or the equivalent with supplements/snacks.   Evaluation: Not met    Previous Nutrition Diagnosis  Inadequate oral intake related to poor appetite as evidenced by minimal intake since admission   Evaluation: No change    CURRENT NUTRITION DIAGNOSIS  Inadequate oral intake related to poor appetite as evidenced by minimal intake since admission     INTERVENTIONS  Implementation  Continue current nutrition POC    Goals  Patient to consume % of nutritionally adequate meal trays TID, or the equivalent with supplements/snacks.    Monitoring/Evaluation  Progress toward goals will be monitored and evaluated per protocol.    Rani Bray RD, LD   6 & 8 Med/Surg RD  Mon-Fri Vocera contact: By name, or \"6 [OR] 8 Med Surg Clinical Dietitian\"  Weekend RD Vocera: \"Weekend Holiday Clinical Dietitian\"        "

## 2024-08-23 NOTE — PLAN OF CARE
"Goal Outcome Evaluation:       Pt A&OX4, lower back pain managed with scheduled lidocaine patch , lyrica and prn oxy. Pt up in chair beginning of shift. @2215, pt requested to go back to bed. Pt SBA with GB and walker. Purwick in place and functioning properly. Last BM 8/21. On 2L NC. Nebulizer administered by RT. BG checked and Lantus given at bedtime. LLE with +2 pitting edema. Encouraged to keep extremity elevated.  On moderate consistent carb diet.  Takes meds whole. On tele with NSR.  Pt slept >8 Hours. No acute changes noted overnight.      Refused senna and miralax         Problem: Adult Inpatient Plan of Care  Goal: Plan of Care Review  Description: The Plan of Care Review/Shift note should be completed every shift.  The Outcome Evaluation is a brief statement about your assessment that the patient is improving, declining, or no change.  This information will be displayed automatically on your shift  note.  Outcome: Progressing  Goal: Patient-Specific Goal (Individualized)  Description: You can add care plan individualizations to a care plan. Examples of Individualization might be:  \"Parent requests to be called daily at 9am for status\", \"I have a hard time hearing out of my right ear\", or \"Do not touch me to wake me up as it startles  me\".  Outcome: Progressing  Goal: Optimal Comfort and Wellbeing  Outcome: Progressing  Goal: Readiness for Transition of Care  Outcome: Progressing     Problem: Fall Injury Risk  Goal: Absence of Fall and Fall-Related Injury  Outcome: Progressing     Problem: Pain Acute  Goal: Optimal Pain Control and Function  Outcome: Progressing     Problem: Comorbidity Management  Goal: Blood Glucose Levels Within Targeted Range  Outcome: Progressing       "

## 2024-08-23 NOTE — PLAN OF CARE
"  Problem: Adult Inpatient Plan of Care  Goal: Plan of Care Review  Description:   VS:  /57 (BP Location: Right arm, Patient Position: Sitting, Cuff Size: Adult Large)   Pulse 86   Temp 98.1  F (36.7  C) (Oral)   Resp 18   Ht 1.676 m (5' 6\")   Wt 58.9 kg (129 lb 13.6 oz)   SpO2 95%   BMI 20.96 kg/m      O2:  O2 2 Liters NC   Output:  Pure Wick    Last BM:  08/21/24   Activity:  Assist 1 / Walker / Gait Belt    Up for meals?  Yes HOB 45 Degrees    Skin:  No Issues    Pain:  Yes    CMS:  A&OX 3-4   Dressing:  None    Diet:  Moderate Consistent Carb / Regular    LDA:  Left and Right PIV SL   Equipment:  Personal Belongings    Plan:  POC   Additional Info:  DuoNeB Treatment    Patient went down for Xray and CT Scan on Cart    Telemetry   No abnormal reading     Pain   Received Tylenol     0839   975 mg  Refuse     Oxycodone     DM II  BG     119  BG     182  Outcome: Progressing  Goal: Patient-Specific Goal (Individualized)  Description:   Patient is waiting outcome of Xray and CT Scan   Outcome: Progressing  Goal: Optimal Comfort and Wellbeing  Outcome: Progressing  Intervention: Provide Person-Centered Care  Recent Flowsheet Documentation  Taken 8/23/2024 0802 by Italia Alcaraz, RN  Trust Relationship/Rapport:   care explained   questions answered   questions encouraged   reassurance provided   choices provided  Goal: Readiness for Transition of Care  Outcome: Progressing     Problem: Fall Injury Risk  Goal: Absence of Fall and Fall-Related Injury  Outcome: Progressing     Problem: Pain Acute  Goal: Optimal Pain Control and Function  Outcome: Progressing     Problem: Comorbidity Management  Goal: Blood Glucose Levels Within Targeted Range  Outcome: Progressing   Goal Outcome Evaluation:      "

## 2024-08-23 NOTE — TELEPHONE ENCOUNTER
Extended Golytely Bowel Prep  recommended due to chronic pain medication noted in chart.  and GLP-1 agonist medication noted in chart.  Instructions were sent via [employer]. Bowel prep was sent 8/23/2024 to    [facility] - [place] - [address] AT [facility] OF [address]    Vale Yates RN Colorectal Cancer   Division of Gastroenterology at [facility]           Yes

## 2024-08-23 NOTE — PROGRESS NOTES
[facility]    Medicine Progress Note - Hospitalist Service, GOLD TEAM 22    Date of Admission:  8/14/2024    Assessment & Plan   Jenn Aparicio is a 69 year old female with a history of O2 dependent COPD (2L O2 @ rest and 3L with exertion), Lung adenocarcinoma s/p RLL lobectomy (2/2016), SBRT to RUL nodule (1/2020) DMII c/b peripheral neuropathy, and HTN, HLD, who was initially admitted to [facility] on 8/14/2024 for further evaluation and treatment of acute worsening back pain, left ankle pain and difficulty walking. Patient required admission to the ICU 8/16 due to new onset a fib with RVR and subsequent hypotension requiring pressors. Patient stabilized and appropriate for transition to the floor with medicine as primary on 8/18.     Changes Today:  - Defer discharge with new onset left lower extremity weakness. Neurosurgery consulted, appreciate assistance. Per NSG, MR lumbar spine findings previously demonstrated are primarily on right with no clear correlation to current symptoms. NSG to evaluate with recs pending  - XR ankle completed and remains negative for fracture  - CT head completed out of caution with recent start of anticoagulation and new onset weakness (despite otherwise normal exam) negative for acute intracranial pathology     A fib with RVR  Hypotension, resolved  Patient developed new onset tachycardia as well as mild hypotension. EKG ordered and confirm a fib. A rapid response was subsequently called and patient received one dose of IV metoprolol with modest improvement in heart rate. Was started on amiodarone drip as well as Norepinephrine due to ongoing a fib and hypotension.  Unclear exact etiology behind a fib, no prior history. Question if due to hypovolemia as patient also noted to have new DARIO, but question if also related to known malignancy. Echo unremarkable. Patient converted back to NSR early AM on 8/17 and was able to be weaned off  Norepinephrine late 8/17. Started on oral metoprolol BID and amiodarone discontinued. Due to persistence, switched to Diltiazem 30 mg q6h and switching to long acting - 120 mg Q daily (8/19) with overlap  -  ANTONINA?DS?-VASc score of 4, and started on heparin low intensity with transition to Eliquis   - Continue tele for now, follow BMP, magnesium levels and target potassium >4, magnesium >2. Currently in sinus on tele  - Continue losartan on 8/22  - Cardiology follow-up on discharge (order placed in navigator) with holter  - Monitor for need for resumption of rate control as off amiodarone    DARIO, resolved  Patient noted to have DARIO on 8/16 after being found to have new onset a fib. Felt likely to be pre-renal in the setting of poor oral intake.   - resolved with IVF    Acute on chronic low back pain:    History of chronic low back pain and imaging from 6/26/24 showed possible metastatic lesion. PTA Cymbalta, lyrica. Reported increase in symptoms on presentation. MRI of lumbar spine completed without evidence of metastatic disease, but did showed multilevel degenerative disease. Case discussed with neurosurgery who recommended outpatient follow-up.  - Pain management  - Continue PTA Medications  - Lidocaine patch PRN  - Tylenol TID scheduled  - Flexeril TID PRN  - Oxycodone 5-10 mg q4h PRN  - Follow up with neurosurgery as outpatient     Left ankle pain and swelling  Difficulty ambulating  Presented with ~ 3 days hx of left ankle pain and swelling with inability ambulate.  Xray on admission w/o acute osseus abnormality, though with soft tissue swelling over dorsum of foot and medial ankle. LLE US w/o DVT. Possible sprain vs infection vs other. Ortho service consulted and felt no acute intervention need. No concerns of septic arthritis and conservative management planned.  - Repeat bilateral LE doppler with interval onset of swelling and left popliteal tenderness obtained on 8/21 and largely reassuring with question of  compressibility of focus in the left mid calf (unclear if perforating branch but does not represent deep distal vein)  - Recommend follow-up LE doppler   - Pain management as above  - PT/OT  - Fall precautions    New left leg weakness and numbness  Per patient, developed over last several days. Intermittent improvements in chronic back pain over same period. Focal weakness of LLE including hip flexion, dorsiflexion. MR lumbar spine previously without evidence of metastatic disease, but did showed multilevel degenerative disease. Per NSG, MR lumbar spine findings previously demonstrated are primarily on right with no clear correlation to current symptoms. NSG to evaluate with recs pending.  - Neurosurgery consulted, appreciate recommendations     Severe O2 dependent COPD:   Chronic Hypoxic respiratory failure  Baseline O2 requirements of 2L at rest and 3L w/ exertion, currently on 2L.  Home medications include: PRN albuterol, duoneb, mucomyst, Trelegy ellipta, and Roflumilast.  PFT s 4/21/23. FEV1 0.55 (24%), FVC 1.36 (46 %), DLCOunc 3.68 (17%). Of note, roflumilast held on admission as patient reported taking only PRN and continues to be on hold given it can precipitate A fib.   - oxygen to keep O2 Saturation 90-92%  - Continue PTA medications (except Roflumilast)  - Follow up with Pulmonology as scheduled     Poorly controlled DMII  Hgb A1C (8.7- 7/9/24). Home medications include jaridance and weekly tirzepatide.   - HOLD PTA medications  - LSSI TID with meals and qhs  - MOD CHO diet  - BG checks TID with meals and qhs      HTN  Blood pressure mildly elevated  on admission at 150/84.  Home medications include losartan. This medication was held due to hypotension as noted above.   - Continue to hold PTA losartan for now     NSCLC, adenocarcinoma- RUL,RLL  S/p RLL lobectomy (2/2016)   Dx 1/2015. STAGE: IA2 [id number] poorly diff adeno RLL, then [id number] IA2 suspected NSCLC RUL, medically inoperable S/p RLL lobectomy  (2/2016) and SBRT to RUL nodule (1/2020). Follows with Dr. Leung with recent clinic visit 7/29/24 w/ plan for 3 months follow up.  - Follow up with Oncology as scheduled    GERD Continue PTA PPI.           Diet: Moderate Consistent Carb (60 g CHO per Meal) Diet  Diet  Snacks/Supplements Adult: Glucerna; With Meals    DVT Prophylaxis: Heparin SQ  Andrade Catheter: Not present  Lines: None     Cardiac Monitoring: ACTIVE order. Indication: Tachyarrhythmias, acute (48 hours)  Code Status: Full Code      Clinically Significant Risk Factors              # Hypoalbuminemia: Lowest albumin = 3.4 g/dL at 8/16/2024 11:21 PM, will monitor as appropriate              # DMII: A1C = 8.7 % (Ref range: <5.7 %) within past 6 months         # Financial/Environmental Concerns: none               Disposition Plan     Medically Ready for Discharge: Anticipated in 2-4 Days         The patient's care was discussed with the Patient.    Nova Tuttle MD  Hospitalist Service, GOLD TEAM [address] [place] [address]  Securely message with [employer] (more info)  Text page via [facility] Paging/Directory   See signed in provider for up to date coverage information  ______________________________________________________________________    Interval History     Endorses back pain that is chronic over the past year. Notes ongoing foot pain and swelling. Notes new onset impaired sensation in LLE that she feels has come on over the last several days. No additional weakness, numbness, changes in speech. Denies palpitations, chest pain, shortness of breath. Cough is intermittent. No additional concerns. We again reviewed previously obtained studies including MRI and LE dopplers. Reviewed plan to obtain XR of ankle to reassess for missed fracture and CT head with new weakness.     Physical Exam   Vital Signs: Temp: 98.1  F (36.7  C) Temp src: Oral BP: 125/57     Resp: 18 SpO2: 95 % O2 Device: Nasal cannula Oxygen  Delivery: 2 LPM  Weight: 129 lbs 13.62 oz  General Appearance: Comfortable, nontoxic appearing female seen laying in bed.  Eyes: PERRLA.  No conjunctival icterus.  HEENT: Atraumatic.  Respiratory: Breathing comfortably on supplemental oxygen.  Cardiovascular: normal rate, no murmurs  Lymph/Hematologic: No bruising on exposed skin.  Skin: No lesions or rashes noted on exposed skin.  Neurologic: Cranial nerves II through XII intact. 5/5 in bilateral upper extremities, 5/5 in right lower extremity, 3-4/5 in left lower extremity (hip flexion and dorsiflexion) with impaired sensation to light touch on left leg  Psychiatric: Mood appropriate    Medical Decision Making       55 MINUTES SPENT BY ME on the date of service doing chart review, history, exam, documentation & further activities per the note.      Data   Imaging results reviewed over the past 24 hrs:   No results found for this or any previous visit (from the past 24 hour(s)).    Recent Labs   Lab 08/23/24  0812 08/23/24  0208 08/22/24  2200 08/22/24  0804 08/22/24  0730 08/21/24  0833 08/21/24  0526 08/20/24  0811 08/20/24  0632 08/17/24  0103 08/16/24  2321   WBC  --   --   --   --  6.6  --  7.3  --  7.3   < >  --    HGB  --   --   --   --  11.3*  --  10.9*  --  10.6*   < >  --    MCV  --   --   --   --  88  --  89  --  87   < >  --    PLT  --   --   --   --  316  --  272  --  256   < >  --    NA  --   --   --   --  143  --  141  --  139   < > 132*   POTASSIUM  --   --   --   --  4.4  --  3.8  --  4.5   < > 3.5   CHLORIDE  --   --   --   --  104  --  102  --  103   < > 97*   CO2  --   --   --   --  31*  --  32*  --  30*   < > 23   BUN  --   --   --   --  6.1*  --  4.9*  --  6.2*   < > 13.9   CR  --   --   --   --  0.41*  --  0.40*  --  0.52   < > 1.18*   ANIONGAP  --   --   --   --  8  --  7  --  6*   < > 12   LUIGI  --   --   --   --  9.0  --  8.4*  --  8.7*   < > 8.7*   * 136* 145*   < > 126*   < > 121*   < > 105*   < > 171*   ALBUMIN  --   --   --   --    --   --   --   --   --   --  3.4*   PROTTOTAL  --   --   --   --   --   --   --   --   --   --  6.8   BILITOTAL  --   --   --   --   --   --   --   --   --   --  0.6   ALKPHOS  --   --   --   --   --   --   --   --   --   --  89   ALT  --   --   --   --   --   --   --   --   --   --  6   AST  --   --   --   --   --   --   --   --   --   --  21    < > = values in this interval not displayed.

## 2024-08-23 NOTE — PLAN OF CARE
Occupational Therapy Discharge Summary    Reason for therapy discharge:    Discharged to transitional care facility.    Progress towards therapy goal(s). See goals on Care Plan in [facility] electronic health record for goal details.  Goals partially met.  Barriers to achieving goals:   discharge from facility.    Therapy recommendation(s):    Continued therapy is recommended.  Rationale/Recommendations:  Pt would benefit from continued OT to increase safety and independence with ADL.  Continue home exercise program.

## 2024-08-24 ENCOUNTER — APPOINTMENT (OUTPATIENT)
Dept: CT IMAGING | Facility: CLINIC | Age: 69
DRG: 551 | End: 2024-08-24
Attending: STUDENT IN AN ORGANIZED HEALTH CARE EDUCATION/TRAINING PROGRAM
Payer: COMMERCIAL

## 2024-08-24 LAB
ANION GAP SERPL CALCULATED.3IONS-SCNC: 9 MMOL/L (ref 7–15)
BUN SERPL-MCNC: 10.3 MG/DL (ref 8–23)
CALCIUM SERPL-MCNC: 9.1 MG/DL (ref 8.8–10.4)
CHLORIDE SERPL-SCNC: 100 MMOL/L (ref 98–107)
CREAT SERPL-MCNC: 0.55 MG/DL (ref 0.51–0.95)
EGFRCR SERPLBLD CKD-EPI 2021: >90 ML/MIN/1.73M2
ERYTHROCYTE [DISTWIDTH] IN BLOOD BY AUTOMATED COUNT: 13.9 % (ref 10–15)
GLUCOSE BLDC GLUCOMTR-MCNC: 123 MG/DL (ref 70–99)
GLUCOSE BLDC GLUCOMTR-MCNC: 164 MG/DL (ref 70–99)
GLUCOSE BLDC GLUCOMTR-MCNC: 167 MG/DL (ref 70–99)
GLUCOSE BLDC GLUCOMTR-MCNC: 185 MG/DL (ref 70–99)
GLUCOSE BLDC GLUCOMTR-MCNC: 189 MG/DL (ref 70–99)
GLUCOSE SERPL-MCNC: 119 MG/DL (ref 70–99)
HCO3 SERPL-SCNC: 33 MMOL/L (ref 22–29)
HCT VFR BLD AUTO: 35.8 % (ref 35–47)
HGB BLD-MCNC: 11.5 G/DL (ref 11.7–15.7)
MAGNESIUM SERPL-MCNC: 1.9 MG/DL (ref 1.7–2.3)
MCH RBC QN AUTO: 28.4 PG (ref 26.5–33)
MCHC RBC AUTO-ENTMCNC: 32.1 G/DL (ref 31.5–36.5)
MCV RBC AUTO: 88 FL (ref 78–100)
PHOSPHATE SERPL-MCNC: 4.5 MG/DL (ref 2.5–4.5)
PLATELET # BLD AUTO: 343 10E3/UL (ref 150–450)
POTASSIUM SERPL-SCNC: 4.3 MMOL/L (ref 3.4–5.3)
RBC # BLD AUTO: 4.05 10E6/UL (ref 3.8–5.2)
SODIUM SERPL-SCNC: 142 MMOL/L (ref 135–145)
WBC # BLD AUTO: 8.1 10E3/UL (ref 4–11)

## 2024-08-24 PROCEDURE — 120N000002 HC R&B MED SURG/OB UMMC

## 2024-08-24 PROCEDURE — 94640 AIRWAY INHALATION TREATMENT: CPT | Mod: 76

## 2024-08-24 PROCEDURE — 250N000011 HC RX IP 250 OP 636: Performed by: STUDENT IN AN ORGANIZED HEALTH CARE EDUCATION/TRAINING PROGRAM

## 2024-08-24 PROCEDURE — 250N000013 HC RX MED GY IP 250 OP 250 PS 637: Performed by: HOSPITALIST

## 2024-08-24 PROCEDURE — 250N000009 HC RX 250

## 2024-08-24 PROCEDURE — 250N000013 HC RX MED GY IP 250 OP 250 PS 637: Performed by: STUDENT IN AN ORGANIZED HEALTH CARE EDUCATION/TRAINING PROGRAM

## 2024-08-24 PROCEDURE — 83735 ASSAY OF MAGNESIUM: CPT | Performed by: HOSPITALIST

## 2024-08-24 PROCEDURE — 85027 COMPLETE CBC AUTOMATED: CPT | Performed by: HOSPITALIST

## 2024-08-24 PROCEDURE — 99232 SBSQ HOSP IP/OBS MODERATE 35: CPT | Performed by: STUDENT IN AN ORGANIZED HEALTH CARE EDUCATION/TRAINING PROGRAM

## 2024-08-24 PROCEDURE — 94640 AIRWAY INHALATION TREATMENT: CPT

## 2024-08-24 PROCEDURE — 74177 CT ABD & PELVIS W/CONTRAST: CPT | Mod: 26 | Performed by: RADIOLOGY

## 2024-08-24 PROCEDURE — 250N000013 HC RX MED GY IP 250 OP 250 PS 637

## 2024-08-24 PROCEDURE — 250N000013 HC RX MED GY IP 250 OP 250 PS 637: Performed by: PHYSICIAN ASSISTANT

## 2024-08-24 PROCEDURE — 250N000009 HC RX 250: Performed by: STUDENT IN AN ORGANIZED HEALTH CARE EDUCATION/TRAINING PROGRAM

## 2024-08-24 PROCEDURE — 84100 ASSAY OF PHOSPHORUS: CPT | Performed by: PHYSICIAN ASSISTANT

## 2024-08-24 PROCEDURE — 74177 CT ABD & PELVIS W/CONTRAST: CPT

## 2024-08-24 PROCEDURE — 36415 COLL VENOUS BLD VENIPUNCTURE: CPT | Performed by: HOSPITALIST

## 2024-08-24 PROCEDURE — 80048 BASIC METABOLIC PNL TOTAL CA: CPT | Performed by: HOSPITALIST

## 2024-08-24 PROCEDURE — 999N000157 HC STATISTIC RCP TIME EA 10 MIN

## 2024-08-24 RX ORDER — IOPAMIDOL 755 MG/ML
100 INJECTION, SOLUTION INTRAVASCULAR ONCE
Status: COMPLETED | OUTPATIENT
Start: 2024-08-24 | End: 2024-08-24

## 2024-08-24 RX ADMIN — POLYETHYLENE GLYCOL 400 AND PROPYLENE GLYCOL 1 DROP: 4; 3 SOLUTION/ DROPS OPHTHALMIC at 08:36

## 2024-08-24 RX ADMIN — LEVALBUTEROL HYDROCHLORIDE 1.25 MG: 1.25 SOLUTION RESPIRATORY (INHALATION) at 08:57

## 2024-08-24 RX ADMIN — OMEPRAZOLE 20 MG: 20 CAPSULE, DELAYED RELEASE ORAL at 08:31

## 2024-08-24 RX ADMIN — SODIUM CHLORIDE 63 ML: 9 INJECTION, SOLUTION INTRAVENOUS at 19:03

## 2024-08-24 RX ADMIN — APIXABAN 5 MG: 5 TABLET, FILM COATED ORAL at 19:31

## 2024-08-24 RX ADMIN — CARBOXYMETHYLCELLULOSE SODIUM 1 DROP: 10 GEL OPHTHALMIC at 19:31

## 2024-08-24 RX ADMIN — CETIRIZINE HYDROCHLORIDE 10 MG: 10 TABLET, FILM COATED ORAL at 08:31

## 2024-08-24 RX ADMIN — CYANOCOBALAMIN TAB 500 MCG 500 MCG: 500 TAB at 08:31

## 2024-08-24 RX ADMIN — IOPAMIDOL 93 ML: 755 INJECTION, SOLUTION INTRAVENOUS at 19:03

## 2024-08-24 RX ADMIN — FLUTICASONE FUROATE AND VILANTEROL TRIFENATATE 1 PUFF: 200; 25 POWDER RESPIRATORY (INHALATION) at 08:35

## 2024-08-24 RX ADMIN — OXYCODONE HYDROCHLORIDE 5 MG: 5 TABLET ORAL at 16:26

## 2024-08-24 RX ADMIN — OXYCODONE HYDROCHLORIDE 5 MG: 5 TABLET ORAL at 08:42

## 2024-08-24 RX ADMIN — CARBOXYMETHYLCELLULOSE SODIUM 1 DROP: 10 GEL OPHTHALMIC at 12:43

## 2024-08-24 RX ADMIN — PREGABALIN 200 MG: 100 CAPSULE ORAL at 08:31

## 2024-08-24 RX ADMIN — ACETAMINOPHEN 975 MG: 325 TABLET ORAL at 09:15

## 2024-08-24 RX ADMIN — DULOXETINE HYDROCHLORIDE 30 MG: 30 CAPSULE, DELAYED RELEASE ORAL at 08:32

## 2024-08-24 RX ADMIN — LIDOCAINE 2 PATCH: 4 PATCH TOPICAL at 19:31

## 2024-08-24 RX ADMIN — DULOXETINE HYDROCHLORIDE 30 MG: 30 CAPSULE, DELAYED RELEASE ORAL at 19:30

## 2024-08-24 RX ADMIN — ATORVASTATIN CALCIUM 10 MG: 10 TABLET, FILM COATED ORAL at 08:31

## 2024-08-24 RX ADMIN — ACETAMINOPHEN 975 MG: 325 TABLET ORAL at 16:26

## 2024-08-24 RX ADMIN — PREGABALIN 200 MG: 100 CAPSULE ORAL at 19:30

## 2024-08-24 RX ADMIN — CYCLOBENZAPRINE HYDROCHLORIDE 5 MG: 5 TABLET, FILM COATED ORAL at 08:42

## 2024-08-24 RX ADMIN — LOSARTAN POTASSIUM 50 MG: 50 TABLET, FILM COATED ORAL at 08:31

## 2024-08-24 RX ADMIN — OXYCODONE HYDROCHLORIDE 5 MG: 5 TABLET ORAL at 22:27

## 2024-08-24 RX ADMIN — CARBOXYMETHYLCELLULOSE SODIUM 1 DROP: 10 GEL OPHTHALMIC at 08:36

## 2024-08-24 RX ADMIN — ASPIRIN 81 MG: 81 TABLET, COATED ORAL at 08:31

## 2024-08-24 RX ADMIN — CARBOXYMETHYLCELLULOSE SODIUM 1 DROP: 10 GEL OPHTHALMIC at 16:26

## 2024-08-24 RX ADMIN — POLYETHYLENE GLYCOL 400 AND PROPYLENE GLYCOL 1 DROP: 4; 3 SOLUTION/ DROPS OPHTHALMIC at 12:45

## 2024-08-24 RX ADMIN — POLYETHYLENE GLYCOL 400 AND PROPYLENE GLYCOL 1 DROP: 4; 3 SOLUTION/ DROPS OPHTHALMIC at 19:31

## 2024-08-24 RX ADMIN — LEVALBUTEROL HYDROCHLORIDE 1.25 MG: 1.25 SOLUTION RESPIRATORY (INHALATION) at 19:40

## 2024-08-24 RX ADMIN — FLUTICASONE PROPIONATE 1 SPRAY: 50 SPRAY, METERED NASAL at 19:31

## 2024-08-24 RX ADMIN — POLYETHYLENE GLYCOL 400 AND PROPYLENE GLYCOL 1 DROP: 4; 3 SOLUTION/ DROPS OPHTHALMIC at 16:26

## 2024-08-24 RX ADMIN — CYCLOBENZAPRINE HYDROCHLORIDE 5 MG: 5 TABLET, FILM COATED ORAL at 19:30

## 2024-08-24 RX ADMIN — UMECLIDINIUM 1 PUFF: 62.5 AEROSOL, POWDER ORAL at 08:34

## 2024-08-24 RX ADMIN — INSULIN ASPART 1 UNITS: 100 INJECTION, SOLUTION INTRAVENOUS; SUBCUTANEOUS at 16:30

## 2024-08-24 RX ADMIN — APIXABAN 5 MG: 5 TABLET, FILM COATED ORAL at 08:33

## 2024-08-24 RX ADMIN — FLUTICASONE PROPIONATE 1 SPRAY: 50 SPRAY, METERED NASAL at 08:34

## 2024-08-24 ASSESSMENT — ACTIVITIES OF DAILY LIVING (ADL)
ADLS_ACUITY_SCORE: 38
ADLS_ACUITY_SCORE: 33
ADLS_ACUITY_SCORE: 32
ADLS_ACUITY_SCORE: 38
ADLS_ACUITY_SCORE: 32
ADLS_ACUITY_SCORE: 32
ADLS_ACUITY_SCORE: 33
ADLS_ACUITY_SCORE: 32
ADLS_ACUITY_SCORE: 33
ADLS_ACUITY_SCORE: 38
ADLS_ACUITY_SCORE: 33
ADLS_ACUITY_SCORE: 32
ADLS_ACUITY_SCORE: 38
ADLS_ACUITY_SCORE: 32
ADLS_ACUITY_SCORE: 33
ADLS_ACUITY_SCORE: 33
ADLS_ACUITY_SCORE: 32
ADLS_ACUITY_SCORE: 33
ADLS_ACUITY_SCORE: 33
ADLS_ACUITY_SCORE: 32

## 2024-08-24 NOTE — PROGRESS NOTES
"Shift: 8506-2187  VS:/51 (BP Location: Left arm)   Pulse 100   Temp 98.6  F (37  C) (Oral)   Resp 17   Ht 1.676 m (5' 6\")   Wt 58.9 kg (129 lb 13.6 oz)   SpO2 97%   BMI 20.96 kg/m       Pain: Prn oxycodone and Tylenol given for chronic back pain  Neuro: A&Ox4.  Cardiac: WDL  Respiratory: on 2L/O2/NC sating 95%  Diet/Appetite: Tolerating reg diet.  /GI: Adequate urine output via pure wick. LBM 8/23  LDA's: L+R PIV SL  Skin: No new skin issues  Activity: 1 Assist w/walker and gait belt  Tests/Procedures: CT pelvis/ABD  Pertinent Labs/Lab Collection: K+, Magnesium and phos RN managed. Next Recheck in AM     Plan: Possible discharge to SNF 8/26  "

## 2024-08-24 NOTE — PLAN OF CARE
Goal Outcome Evaluation:      Plan of Care Reviewed With: patient    Overall Patient Progress: no changeOverall Patient Progress: no change     Pt is alert and oriented x 4, is on continent of bowel, last BM was on the 8/23, but mixed incontinent of urine, uses pure-wick overnight. Pt is on consistent carb(60g CHO per meal) diet. Takes pills whole, assist of 1 with walker and gait belt, on a daily weight, has left and right PIV saline locked. Pt is on a 2 litters oxygen nasal cannula, and on RN managed potassium and phosphorus, on tele, and cardiac monitoring. Pt was able to make needs known, denied chest pain, SON, N/V, call light was within reach. No acute situation noted this shift. Continue pt's plan of care.

## 2024-08-24 NOTE — PROGRESS NOTES
[facility]    Medicine Progress Note - Hospitalist Service, GOLD TEAM 22    Date of Admission:  8/14/2024    Assessment & Plan   Jenn Aparicio is a 69 year old female with a history of O2 dependent COPD (2L O2 @ rest and 3L with exertion), Lung adenocarcinoma s/p RLL lobectomy (2/2016), SBRT to RUL nodule (1/2020) DMII c/b peripheral neuropathy, and HTN, HLD, who was initially admitted to [facility] on 8/14/2024 for further evaluation and treatment of acute worsening back pain, left ankle pain and difficulty walking. Patient required admission to the ICU 8/16 due to new onset a fib with RVR and subsequent hypotension requiring pressors. Patient stabilized and appropriate for transition to the floor with medicine as primary on 8/18.     Changes Today:  - CT AP to rule out acute cause for worsening back pain     A fib with RVR  Hypotension, resolved  Patient developed new onset tachycardia as well as mild hypotension. EKG ordered and confirm a fib. A rapid response was subsequently called and patient received one dose of IV metoprolol with modest improvement in heart rate. Was started on amiodarone drip as well as Norepinephrine due to ongoing a fib and hypotension.  Unclear exact etiology behind a fib, no prior history. Question if due to hypovolemia as patient also noted to have new DARIO, but question if also related to known malignancy. Echo unremarkable. Patient converted back to NSR early AM on 8/17 and was able to be weaned off Norepinephrine late 8/17. Started on oral metoprolol BID and amiodarone discontinued. Due to persistence, switched to Diltiazem 30 mg q6h and switching to long acting - 120 mg Q daily (8/19) with overlap  -  ANTONINA?DS?-VASc score of 4, and started on heparin low intensity with transition to Eliquis   - Continue tele for now, follow BMP, magnesium levels and target potassium >4, magnesium >2. Currently in sinus on tele  - Continue losartan on 8/22  -  Cardiology follow-up on discharge (order placed in navigator) with holter  - Monitor for need for resumption of rate control as off amiodarone    DARIO, resolved  Patient noted to have DARIO on 8/16 after being found to have new onset a fib. Felt likely to be pre-renal in the setting of poor oral intake.   - resolved with IVF    Acute on chronic low back pain:    History of chronic low back pain and imaging from 6/26/24 showed possible metastatic lesion. PTA Cymbalta, lyrica. Reported increase in symptoms on presentation. MRI of lumbar spine completed without evidence of metastatic disease, but did showed multilevel degenerative disease. Case discussed with neurosurgery who recommended outpatient follow-up.  - Pain management  - Continue PTA Medications  - Lidocaine patch PRN  - Tylenol TID scheduled  - Flexeril TID PRN  - Oxycodone 5-10 mg q4h PRN  - Follow up with neurosurgery as outpatient     Left ankle pain and swelling  Difficulty ambulating  Presented with ~ 3 days hx of left ankle pain and swelling with inability ambulate.  Xray on admission w/o acute osseus abnormality, though with soft tissue swelling over dorsum of foot and medial ankle. LLE US w/o DVT. Possible sprain vs infection vs other. Ortho service consulted and felt no acute intervention need. No concerns of septic arthritis and conservative management planned.  - Repeat bilateral LE doppler with interval onset of swelling and left popliteal tenderness obtained on 8/21 and largely reassuring with question of compressibility of focus in the left mid calf (unclear if perforating branch but does not represent deep distal vein)  - Recommend follow-up LE doppler   - Pain management as above  - PT/OT  - Fall precautions    New left leg weakness and numbness  Per patient, developed over last several days. Intermittent improvements in chronic back pain over same period. Focal weakness of LLE including hip flexion, dorsiflexion. MR lumbar spine previously  without evidence of metastatic disease, but did showed multilevel degenerative disease. Per NSG, MR lumbar spine findings previously demonstrated are primarily on right with no clear correlation to current symptoms. NSG to evaluate with recs pending.  - Neurosurgery consulted, appreciate recommendations     Severe O2 dependent COPD:   Chronic Hypoxic respiratory failure  Baseline O2 requirements of 2L at rest and 3L w/ exertion, currently on 2L.  Home medications include: PRN albuterol, duoneb, mucomyst, Trelegy ellipta, and Roflumilast.  PFT s 4/21/23. FEV1 0.55 (24%), FVC 1.36 (46 %), DLCOunc 3.68 (17%). Of note, roflumilast held on admission as patient reported taking only PRN and continues to be on hold given it can precipitate A fib.   - oxygen to keep O2 Saturation 90-92%  - Continue PTA medications (except Roflumilast)  - Follow up with Pulmonology as scheduled     Poorly controlled DMII  Hgb A1C (8.7- 7/9/24). Home medications include jaridance and weekly tirzepatide.   - HOLD PTA medications  - LSSI TID with meals and qhs  - MOD CHO diet  - BG checks TID with meals and qhs      HTN  Blood pressure mildly elevated  on admission at 150/84.  Home medications include losartan. This medication was held due to hypotension as noted above.   - Continue to hold PTA losartan for now     NSCLC, adenocarcinoma- RUL,RLL  S/p RLL lobectomy (2/2016)   Dx 1/2015. STAGE: IA2 [id number] poorly diff adeno RLL, then [id number] IA2 suspected NSCLC RUL, medically inoperable S/p RLL lobectomy (2/2016) and SBRT to RUL nodule (1/2020). Follows with Dr. Leung with recent clinic visit 7/29/24 w/ plan for 3 months follow up.  - Follow up with Oncology as scheduled    GERD Continue PTA PPI.           Diet: Moderate Consistent Carb (60 g CHO per Meal) Diet  Diet  Snacks/Supplements Adult: Glucerna; With Meals    DVT Prophylaxis: Heparin SQ  Andrade Catheter: Not present  Lines: None     Cardiac Monitoring: None  Code Status: Full Code       Clinically Significant Risk Factors              # Hypoalbuminemia: Lowest albumin = 3.4 g/dL at 8/16/2024 11:21 PM, will monitor as appropriate              # DMII: A1C = 8.7 % (Ref range: <5.7 %) within past 6 months    # Moderate Malnutrition: based on nutrition assessment      # Financial/Environmental Concerns: none               Disposition Plan     Medically Ready for Discharge: Anticipated in 2-4 Days         The patient's care was discussed with the Patient.    Nova Tuttle MD  Hospitalist Service, GOLD TEAM [address] [place] [address]  Securely message with [email or web address] (more info)  Text page via [facility] Paging/Directory   See signed in provider for up to date coverage information  ______________________________________________________________________    Interval History     Endorses back pain that is chronic over the past year. Notes ongoing foot pain and swelling. Notes new onset impaired sensation in LLE that she feels has come on over the last several days. Limited additional symptoms or concerns. Plan to obtain CT scan reviewed with patient who is agreeable.      Physical Exam   Vital Signs: Temp: 98.5  F (36.9  C) Temp src: Oral BP: 113/55 Pulse: 110   Resp: 18 SpO2: 91 % O2 Device: Nasal cannula Oxygen Delivery: 2 LPM  Weight: 129 lbs 13.62 oz  General Appearance: Comfortable, nontoxic appearing female seen laying in bed.  Eyes: PERRLA.  No conjunctival icterus.  HEENT: Atraumatic.  Respiratory: Breathing comfortably on supplemental oxygen.  Cardiovascular: normal rate, no murmurs  Lymph/Hematologic: No bruising on exposed skin.  Skin: No lesions or rashes noted on exposed skin.  Neurologic: Cranial nerves II through XII intact. 5/5 in bilateral upper extremities, 5/5 in right lower extremity, 3-4/5 in left lower extremity (hip flexion and dorsiflexion) with impaired sensation to light touch on left leg  Psychiatric: Mood appropriate    Medical Decision  Making       55 MINUTES SPENT BY ME on the date of service doing chart review, history, exam, documentation & further activities per the note.      Data   Imaging results reviewed over the past 24 hrs:   No results found for this or any previous visit (from the past 24 hour(s)).    Recent Labs   Lab 08/24/24  1249 08/24/24  0839 08/24/24  0829 08/23/24  1225 08/23/24  0839 08/22/24  0804 08/22/24  0730   WBC  --  8.1  --   --  7.4  --  6.6   HGB  --  11.5*  --   --  12.0  --  11.3*   MCV  --  88  --   --  90  --  88   PLT  --  343  --   --  330  --  316   NA  --  142  --   --  140  --  143   POTASSIUM  --  4.3  --   --  4.6  --  4.4   CHLORIDE  --  100  --   --  99  --  104   CO2  --  33*  --   --  37*  --  31*   BUN  --  10.3  --   --  6.8*  --  6.1*   CR  --  0.55  --   --  0.50*  --  0.41*   ANIONGAP  --  9  --   --  4*  --  8   LUIGI  --  9.1  --   --  8.8  --  9.0   * 119* 123*   < > 109*   < > 126*    < > = values in this interval not displayed.

## 2024-08-24 NOTE — PLAN OF CARE
"  Problem: Adult Inpatient Plan of Care  Goal: Plan of Care Review  Description: The Plan of Care Review/Shift note should be completed every shift.  The Outcome Evaluation is a brief statement about your assessment that the patient is improving, declining, or no change.  This information will be displayed automatically on your shift  note.  Outcome: Progressing  Flowsheets (Taken 8/23/2024 1000)  Outcome Evaluation: Pt able to ambulate small steps this shift and is using the bedside commode. C/O pain in LLE prn oxy given. Pt stable on 2L O2. Assist of one with walker and gait belt bedalarn on. continue POC  Plan of Care Reviewed With: patient  Overall Patient Progress: improving  Goal: Patient-Specific Goal (Individualized)  Description: You can add care plan individualizations to a care plan. Examples of Individualization might be:  \"Parent requests to be called daily at 9am for status\", \"I have a hard time hearing out of my right ear\", or \"Do not touch me to wake me up as it startles  me\".  Outcome: Progressing  Goal: Absence of Hospital-Acquired Illness or Injury  Intervention: Identify and Manage Fall Risk  Recent Flowsheet Documentation  Taken 8/23/2024 1548 by Debra Menjivar, RN  Safety Promotion/Fall Prevention:   activity supervised   assistive device/personal items within reach   clutter free environment maintained   increased rounding and observation   increase visualization of patient   lighting adjusted   mobility aid in reach   nonskid shoes/slippers when out of bed   patient and family education   room organization consistent   safety round/check completed   supervised activity   treat reversible contributory factors  Goal: Optimal Comfort and Wellbeing  Outcome: Progressing  Intervention: Provide Person-Centered Care  Recent Flowsheet Documentation  Taken 8/23/2024 1548 by Debra Menjivar RN  Trust Relationship/Rapport:   care explained   questions answered   questions encouraged   reassurance " provided   choices provided  Goal: Readiness for Transition of Care  Outcome: Progressing   Goal Outcome Evaluation:      Plan of Care Reviewed With: patient    Overall Patient Progress: improvingOverall Patient Progress: improving    Outcome Evaluation: Pt able to ambulate small steps this shift and is using the bedside commode. C/O pain in LLE prn oxy given. Pt stable on 2L O2. Assist of one with walker and gait belt bedalarn on. continue POC

## 2024-08-24 NOTE — PROGRESS NOTES
Care Management Follow Up    Length of Stay (days): 10    Expected Discharge Date: 08/26/24, potentially   Concerns to be Addressed: discharge planning     Patient plan of care discussed at interdisciplinary rounds: Yes    Anticipated Discharge Disposition: Transitional Care    [facility] at [address] [address]  Melissa Mireles, Liaison at [facility]  PH: [phone]  (Weekend [facility] Liaison PH: [phone])  Fax: [phone]  [email or web address]      Anticipated Discharge Services:  (Post Acute Therapies)  Anticipated Discharge DME:  (SNF to provide all necessary DME)    Patient/family educated on Medicare website which has current facility and service quality ratings: yes  Education Provided on the Discharge Plan: Yes  Patient/Family in Agreement with the Plan: yes    Referrals Placed by CM/SW: Post Acute Facilities  Private pay costs discussed: Not applicable d/t pt having MA    Additional Information:  Per IDT rounds, pt will potentially ready on Monday, will confirm on Sunday.    1143: TERESSA spoke to [facility] Chris Bhat (PH: [phone]), provided update on the above information re: MARIE.     [facility] after [facility] rounds.        Weekend Coverage   ODILON QUACH   PHONE: [phone]     Weekday 6 MS  PH: [phone]     SW Coverage SEARCHABLE in [employer] - search 6 MS SW  (or by name)  Or click on link below:  6 Med Surg Vocera

## 2024-08-24 NOTE — PLAN OF CARE
"  Problem: Adult Inpatient Plan of Care  Goal: Plan of Care Review  Description:   VS:  /55 (BP Location: Right arm, Patient Position: Supine, Cuff Size: Adult Large)   Pulse 110   Temp 98.5  F (36.9  C) (Oral)   Resp 18   Ht 1.676 m (5' 6\")   Wt 58.9 kg (129 lb 13.6 oz)   SpO2 91%   BMI 20.96 kg/m      O2:  O2  2 Liters NC   Output:   Continent of Bowel / Pure Wick    Last BM:  08/23/24   Activity:  Assist 1 / Walker /  Gait Belt    Up for meals?  Yes HOB 45 Degrees    Skin:  No Issues    Pain:  Yes    CMS:  A&OX 3-4   Dressing:  None    Diet:  Moderate Consistent Carb / Regular / 60  grams CHO   LDA:  Left and Right PIV SL   Equipment:  Personal Belongings    Plan:  POC   Additional Info:  Chronic Pain      Provider Discontinued Telemetry     Received   Oxycodone   0842  Tylenol           0915       Diabetes Mellitus II  BG     123  BG     164    RN Managed   Potassium      4.3  No replacement draw 08/25/24  Magnesium    1.9  No replacement draw 08/25/24  Phosphorus   4.5  No replacement draw 08/25/24    Outcome: Progressing  Flowsheets (Taken 8/24/2024 1127)  Plan of Care Reviewed With: patient  Goal: Patient-Specific Goal (Individualized)  Description: You can add care plan individualizations to a care plan. Examples of Individualization might be:  \"Parent requests to be called daily at 9am for status\", \"I have a hard time hearing out of my right ear\", or \"Do not touch me to wake me up as it startles  me\".  Outcome: Progressing  Goal: Optimal Comfort and Wellbeing  Outcome: Progressing  Goal: Readiness for Transition of Care  Outcome: Progressing     Problem: Fall Injury Risk  Goal: Absence of Fall and Fall-Related Injury  Outcome: Progressing  Intervention: Promote Injury-Free Environment  Recent Flowsheet Documentation  Taken 8/24/2024 0800 by Italia Alcaraz, RN  Safety Promotion/Fall Prevention:   activity supervised   assistive device/personal items within reach   clutter free environment " maintained   increased rounding and observation   increase visualization of patient   lighting adjusted   mobility aid in reach   nonskid shoes/slippers when out of bed   patient and family education   room organization consistent   safety round/check completed   supervised activity   treat reversible contributory factors     Problem: Pain Acute  Goal: Optimal Pain Control and Function  Outcome: Progressing     Problem: Comorbidity Management  Goal: Blood Glucose Levels Within Targeted Range  Outcome: Progressing   Goal Outcome Evaluation:      Plan of Care Reviewed With: patient understood most information received

## 2024-08-25 ENCOUNTER — APPOINTMENT (OUTPATIENT)
Dept: PHYSICAL THERAPY | Facility: CLINIC | Age: 69
DRG: 551 | End: 2024-08-25
Payer: COMMERCIAL

## 2024-08-25 LAB
ANION GAP SERPL CALCULATED.3IONS-SCNC: 7 MMOL/L (ref 7–15)
BUN SERPL-MCNC: 8.1 MG/DL (ref 8–23)
CALCIUM SERPL-MCNC: 9.4 MG/DL (ref 8.8–10.4)
CHLORIDE SERPL-SCNC: 98 MMOL/L (ref 98–107)
CREAT SERPL-MCNC: 0.53 MG/DL (ref 0.51–0.95)
EGFRCR SERPLBLD CKD-EPI 2021: >90 ML/MIN/1.73M2
ERYTHROCYTE [DISTWIDTH] IN BLOOD BY AUTOMATED COUNT: 13.9 % (ref 10–15)
GLUCOSE BLDC GLUCOMTR-MCNC: 133 MG/DL (ref 70–99)
GLUCOSE BLDC GLUCOMTR-MCNC: 138 MG/DL (ref 70–99)
GLUCOSE BLDC GLUCOMTR-MCNC: 152 MG/DL (ref 70–99)
GLUCOSE BLDC GLUCOMTR-MCNC: 168 MG/DL (ref 70–99)
GLUCOSE BLDC GLUCOMTR-MCNC: 173 MG/DL (ref 70–99)
GLUCOSE SERPL-MCNC: 139 MG/DL (ref 70–99)
HCO3 SERPL-SCNC: 34 MMOL/L (ref 22–29)
HCT VFR BLD AUTO: 36.7 % (ref 35–47)
HGB BLD-MCNC: 11.7 G/DL (ref 11.7–15.7)
MAGNESIUM SERPL-MCNC: 2 MG/DL (ref 1.7–2.3)
MCH RBC QN AUTO: 28.3 PG (ref 26.5–33)
MCHC RBC AUTO-ENTMCNC: 31.9 G/DL (ref 31.5–36.5)
MCV RBC AUTO: 89 FL (ref 78–100)
PHOSPHATE SERPL-MCNC: 3.9 MG/DL (ref 2.5–4.5)
PLATELET # BLD AUTO: 341 10E3/UL (ref 150–450)
POTASSIUM SERPL-SCNC: 5.3 MMOL/L (ref 3.4–5.3)
RBC # BLD AUTO: 4.13 10E6/UL (ref 3.8–5.2)
SODIUM SERPL-SCNC: 139 MMOL/L (ref 135–145)
WBC # BLD AUTO: 7.8 10E3/UL (ref 4–11)

## 2024-08-25 PROCEDURE — 80048 BASIC METABOLIC PNL TOTAL CA: CPT | Performed by: HOSPITALIST

## 2024-08-25 PROCEDURE — 99233 SBSQ HOSP IP/OBS HIGH 50: CPT | Performed by: STUDENT IN AN ORGANIZED HEALTH CARE EDUCATION/TRAINING PROGRAM

## 2024-08-25 PROCEDURE — 97530 THERAPEUTIC ACTIVITIES: CPT | Mod: GP | Performed by: PHYSICAL THERAPIST

## 2024-08-25 PROCEDURE — 250N000013 HC RX MED GY IP 250 OP 250 PS 637: Performed by: STUDENT IN AN ORGANIZED HEALTH CARE EDUCATION/TRAINING PROGRAM

## 2024-08-25 PROCEDURE — 999N000157 HC STATISTIC RCP TIME EA 10 MIN

## 2024-08-25 PROCEDURE — 250N000013 HC RX MED GY IP 250 OP 250 PS 637

## 2024-08-25 PROCEDURE — 250N000009 HC RX 250

## 2024-08-25 PROCEDURE — 83735 ASSAY OF MAGNESIUM: CPT | Performed by: HOSPITALIST

## 2024-08-25 PROCEDURE — 250N000013 HC RX MED GY IP 250 OP 250 PS 637: Performed by: HOSPITALIST

## 2024-08-25 PROCEDURE — 36415 COLL VENOUS BLD VENIPUNCTURE: CPT | Performed by: HOSPITALIST

## 2024-08-25 PROCEDURE — 85027 COMPLETE CBC AUTOMATED: CPT | Performed by: HOSPITALIST

## 2024-08-25 PROCEDURE — 94640 AIRWAY INHALATION TREATMENT: CPT | Mod: 76

## 2024-08-25 PROCEDURE — 84100 ASSAY OF PHOSPHORUS: CPT | Performed by: STUDENT IN AN ORGANIZED HEALTH CARE EDUCATION/TRAINING PROGRAM

## 2024-08-25 PROCEDURE — 250N000013 HC RX MED GY IP 250 OP 250 PS 637: Performed by: PHYSICIAN ASSISTANT

## 2024-08-25 PROCEDURE — 94640 AIRWAY INHALATION TREATMENT: CPT

## 2024-08-25 PROCEDURE — 120N000002 HC R&B MED SURG/OB UMMC

## 2024-08-25 RX ORDER — CYCLOBENZAPRINE HCL 5 MG
5 TABLET ORAL 3 TIMES DAILY
Status: DISCONTINUED | OUTPATIENT
Start: 2024-08-25 | End: 2024-08-26 | Stop reason: HOSPADM

## 2024-08-25 RX ORDER — ACETAMINOPHEN 325 MG/1
975 TABLET ORAL 3 TIMES DAILY
Status: DISCONTINUED | OUTPATIENT
Start: 2024-08-25 | End: 2024-08-26 | Stop reason: HOSPADM

## 2024-08-25 RX ADMIN — CARBOXYMETHYLCELLULOSE SODIUM 1 DROP: 10 GEL OPHTHALMIC at 08:21

## 2024-08-25 RX ADMIN — ACETAMINOPHEN 975 MG: 325 TABLET ORAL at 10:22

## 2024-08-25 RX ADMIN — FLUTICASONE FUROATE AND VILANTEROL TRIFENATATE 1 PUFF: 200; 25 POWDER RESPIRATORY (INHALATION) at 08:20

## 2024-08-25 RX ADMIN — CYANOCOBALAMIN TAB 500 MCG 500 MCG: 500 TAB at 08:21

## 2024-08-25 RX ADMIN — CARBOXYMETHYLCELLULOSE SODIUM 1 DROP: 10 GEL OPHTHALMIC at 13:16

## 2024-08-25 RX ADMIN — CYCLOBENZAPRINE HYDROCHLORIDE 5 MG: 5 TABLET, FILM COATED ORAL at 13:17

## 2024-08-25 RX ADMIN — OXYCODONE HYDROCHLORIDE 5 MG: 5 TABLET ORAL at 20:59

## 2024-08-25 RX ADMIN — FLUTICASONE PROPIONATE 1 SPRAY: 50 SPRAY, METERED NASAL at 20:56

## 2024-08-25 RX ADMIN — POLYETHYLENE GLYCOL 400 AND PROPYLENE GLYCOL 1 DROP: 4; 3 SOLUTION/ DROPS OPHTHALMIC at 13:19

## 2024-08-25 RX ADMIN — POLYETHYLENE GLYCOL 400 AND PROPYLENE GLYCOL 1 DROP: 4; 3 SOLUTION/ DROPS OPHTHALMIC at 18:26

## 2024-08-25 RX ADMIN — CARBOXYMETHYLCELLULOSE SODIUM 1 DROP: 10 GEL OPHTHALMIC at 20:55

## 2024-08-25 RX ADMIN — INSULIN ASPART 1 UNITS: 100 INJECTION, SOLUTION INTRAVENOUS; SUBCUTANEOUS at 18:27

## 2024-08-25 RX ADMIN — APIXABAN 5 MG: 5 TABLET, FILM COATED ORAL at 08:20

## 2024-08-25 RX ADMIN — ACETAMINOPHEN 975 MG: 325 TABLET ORAL at 20:55

## 2024-08-25 RX ADMIN — ACETAMINOPHEN 975 MG: 325 TABLET ORAL at 04:44

## 2024-08-25 RX ADMIN — POLYETHYLENE GLYCOL 400 AND PROPYLENE GLYCOL 1 DROP: 4; 3 SOLUTION/ DROPS OPHTHALMIC at 08:21

## 2024-08-25 RX ADMIN — PREGABALIN 200 MG: 100 CAPSULE ORAL at 08:21

## 2024-08-25 RX ADMIN — OXYCODONE HYDROCHLORIDE 5 MG: 5 TABLET ORAL at 10:23

## 2024-08-25 RX ADMIN — CYCLOBENZAPRINE HYDROCHLORIDE 5 MG: 5 TABLET, FILM COATED ORAL at 20:55

## 2024-08-25 RX ADMIN — DULOXETINE HYDROCHLORIDE 30 MG: 30 CAPSULE, DELAYED RELEASE ORAL at 20:55

## 2024-08-25 RX ADMIN — LEVALBUTEROL HYDROCHLORIDE 1.25 MG: 1.25 SOLUTION RESPIRATORY (INHALATION) at 08:27

## 2024-08-25 RX ADMIN — CETIRIZINE HYDROCHLORIDE 10 MG: 10 TABLET, FILM COATED ORAL at 08:20

## 2024-08-25 RX ADMIN — CARBOXYMETHYLCELLULOSE SODIUM 1 DROP: 10 GEL OPHTHALMIC at 18:26

## 2024-08-25 RX ADMIN — FLUTICASONE PROPIONATE 1 SPRAY: 50 SPRAY, METERED NASAL at 08:20

## 2024-08-25 RX ADMIN — OMEPRAZOLE 20 MG: 20 CAPSULE, DELAYED RELEASE ORAL at 08:20

## 2024-08-25 RX ADMIN — ATORVASTATIN CALCIUM 10 MG: 10 TABLET, FILM COATED ORAL at 08:21

## 2024-08-25 RX ADMIN — LOSARTAN POTASSIUM 50 MG: 50 TABLET, FILM COATED ORAL at 08:21

## 2024-08-25 RX ADMIN — LIDOCAINE 2 PATCH: 4 PATCH TOPICAL at 20:56

## 2024-08-25 RX ADMIN — OXYCODONE HYDROCHLORIDE 5 MG: 5 TABLET ORAL at 04:44

## 2024-08-25 RX ADMIN — DULOXETINE HYDROCHLORIDE 30 MG: 30 CAPSULE, DELAYED RELEASE ORAL at 08:24

## 2024-08-25 RX ADMIN — ASPIRIN 81 MG: 81 TABLET, COATED ORAL at 08:21

## 2024-08-25 RX ADMIN — POLYETHYLENE GLYCOL 400 AND PROPYLENE GLYCOL 1 DROP: 4; 3 SOLUTION/ DROPS OPHTHALMIC at 20:55

## 2024-08-25 RX ADMIN — UMECLIDINIUM 1 PUFF: 62.5 AEROSOL, POWDER ORAL at 08:20

## 2024-08-25 RX ADMIN — PREGABALIN 200 MG: 100 CAPSULE ORAL at 20:55

## 2024-08-25 RX ADMIN — LEVALBUTEROL HYDROCHLORIDE 1.25 MG: 1.25 SOLUTION RESPIRATORY (INHALATION) at 19:58

## 2024-08-25 RX ADMIN — APIXABAN 5 MG: 5 TABLET, FILM COATED ORAL at 20:55

## 2024-08-25 ASSESSMENT — ACTIVITIES OF DAILY LIVING (ADL)
ADLS_ACUITY_SCORE: 38
ADLS_ACUITY_SCORE: 38
ADLS_ACUITY_SCORE: 37
ADLS_ACUITY_SCORE: 38
ADLS_ACUITY_SCORE: 38
ADLS_ACUITY_SCORE: 37
ADLS_ACUITY_SCORE: 37
ADLS_ACUITY_SCORE: 38
ADLS_ACUITY_SCORE: 38
ADLS_ACUITY_SCORE: 37
ADLS_ACUITY_SCORE: 38
ADLS_ACUITY_SCORE: 37
ADLS_ACUITY_SCORE: 38
ADLS_ACUITY_SCORE: 37

## 2024-08-25 NOTE — PROGRESS NOTES
[facility]    Medicine Progress Note - Hospitalist Service, GOLD TEAM 22    Date of Admission:  8/14/2024    Assessment & Plan   Jenn Aparicio is a 69 year old female with a history of O2 dependent COPD (2L O2 @ rest and 3L with exertion), Lung adenocarcinoma s/p RLL lobectomy (2/2016), SBRT to RUL nodule (1/2020) DMII c/b peripheral neuropathy, and HTN, HLD, who was initially admitted to [facility] on 8/14/2024 for further evaluation and treatment of acute worsening back pain, left ankle pain and difficulty walking. Patient required admission to the ICU 8/16 due to new onset a fib with RVR and subsequent hypotension requiring pressors. Patient stabilized and appropriate for transition to the floor with medicine as primary on 8/18.     Changes Today:  - CT AP to rule out acute cause for worsening back pain obtained 8/24 without acute or progressive abnormality  - Change flexeril to scheduled, APAP to scheduled, start icy-hot     A fib with RVR  Hypotension, resolved  Patient developed new onset tachycardia as well as mild hypotension. EKG ordered and confirm a fib. A rapid response was subsequently called and patient received one dose of IV metoprolol with modest improvement in heart rate. Was started on amiodarone drip as well as Norepinephrine due to ongoing a fib and hypotension.  Unclear exact etiology behind a fib, no prior history. Question if due to hypovolemia as patient also noted to have new DARIO, but question if also related to known malignancy. Echo unremarkable. Patient converted back to NSR early AM on 8/17 and was able to be weaned off Norepinephrine late 8/17. Started on oral metoprolol BID and amiodarone which hav since been discontinued. Due to persistence, switched BB to Diltiazem. ANTONINA?DS?-VASc score of 4, and initially on heparin low intensity with transition to Eliquis.  - Continue eliquis  - Continue tele for now, follow BMP, magnesium levels and target  potassium >4, magnesium >2. Currently in sinus on tele  - Continue losartan on 8/22  - Cardiology follow-up on discharge (order placed in navigator) with holter  - Monitor for need for resumption of rate control as off amiodarone    DARIO, resolved  Patient noted to have DARIO on 8/16 after being found to have new onset a fib. Felt likely to be pre-renal in the setting of poor oral intake.   - resolved with IVF    Acute on chronic low back pain  History of chronic low back pain and imaging from 6/26/24 showed possible metastatic lesion. PTA Cymbalta, lyrica. Reported increase in symptoms on presentation. MRI of lumbar spine completed without evidence of metastatic disease, but did showed multilevel degenerative disease. Case discussed with neurosurgery who recommended outpatient follow-up.  - Pain management  - Continue PTA Medications  - Lidocaine patch PRN  - Tylenol TID scheduled  - Flexeril TID to scheduled  - Oxycodone 5 mg q6h PRN, taper as able  - Follow up with neurosurgery as outpatient     Left ankle pain and swelling  Difficulty ambulating  Presented with ~ 3 days hx of left ankle pain and swelling with inability ambulate.  Xray on admission w/o acute osseus abnormality, though with soft tissue swelling over dorsum of foot and medial ankle. LLE US w/o DVT. Possible sprain vs infection vs other. Ortho service consulted and felt no acute intervention need. No concerns of septic arthritis and conservative management planned.  - Repeat bilateral LE doppler with interval onset of swelling and left popliteal tenderness obtained on 8/21 and largely reassuring with question of compressibility of focus in the left mid calf (unclear if perforating branch but does not represent deep distal vein)  - Recommend follow-up LE doppler   - Pain management as above  - PT/OT  - Fall precautions    New left leg weakness and numbness  Per patient, developed over last several days. Intermittent improvements in chronic back pain  over same period. Focal weakness of LLE including hip flexion, and plantar/dorsiflexion. Initially difficult to determine if pain limited vs true weakness, however, now seems more pain-limited and is endorsed as such by patient. MR lumbar spine previously without evidence of metastatic disease, but did showed multilevel degenerative disease. Per NSG, MR lumbar spine findings previously demonstrated are primarily on right with no clear correlation to current symptoms. Follow-up CT ordered without acute changes.  - Neurosurgery consulted, appreciate recommendations. No indication for surgical intervention     Severe O2 dependent COPD:   Chronic Hypoxic respiratory failure  Baseline O2 requirements of 2L at rest and 3L w/ exertion, currently on 2L.  Home medications include: PRN albuterol, duoneb, mucomyst, Trelegy ellipta, and Roflumilast.  PFT s 4/21/23. FEV1 0.55 (24%), FVC 1.36 (46 %), DLCOunc 3.68 (17%). Of note, roflumilast held on admission as patient reported taking only PRN and continues to be on hold given it can precipitate A fib.   - oxygen to keep O2 Saturation 90-92%  - Continue PTA medications (except Roflumilast)  - Follow up with Pulmonology as scheduled     Poorly controlled DMII  Hgb A1C (8.7- 7/9/24). Home medications include jaridance and weekly tirzepatide.   - HOLD PTA medications  - LSSI TID with meals and qhs  - MOD CHO diet  - BG checks TID with meals and qhs      HTN  Blood pressure mildly elevated  on admission at 150/84.  Home medications include losartan. This medication was held due to hypotension as noted above.   - Continue to hold PTA losartan for now     NSCLC, adenocarcinoma- RUL,RLL  S/p RLL lobectomy (2/2016)   Dx 1/2015. STAGE: IA2 [id number] poorly diff adeno RLL, then [id number] IA2 suspected NSCLC RUL, medically inoperable S/p RLL lobectomy (2/2016) and SBRT to RUL nodule (1/2020). Follows with Dr. Leung with recent clinic visit 7/29/24 w/ plan for 3 months follow up.  - Follow  "up with Oncology as scheduled    GERD  - Continue PTA PPI          Diet: Moderate Consistent Carb (60 g CHO per Meal) Diet  Diet  Snacks/Supplements Adult: Glucerna; With Meals    DVT Prophylaxis: Heparin SQ  Andrade Catheter: Not present  Lines: None     Cardiac Monitoring: None  Code Status: Full Code      Clinically Significant Risk Factors              # Hypoalbuminemia: Lowest albumin = 3.4 g/dL at 8/16/2024 11:21 PM, will monitor as appropriate              # DMII: A1C = 8.7 % (Ref range: <5.7 %) within past 6 months   # Obesity: Estimated body mass index is 34.09 kg/m  as calculated from the following:    Height as of this encounter: 1.676 m (5' 6\").    Weight as of this encounter: 95.8 kg (211 lb 3.2 oz).   # Moderate Malnutrition: based on nutrition assessment      # Financial/Environmental Concerns: none               Disposition Plan     Medically Ready for Discharge: Anticipated in 2-4 Days         The patient's care was discussed with the Patient.    Nova Tuttle MD  Hospitalist Service, GOLD TEAM [address] [place] [address]  Securely message with [employer] (more info)  Text page via [facility] Paging/Directory   See signed in provider for up to date coverage information  ______________________________________________________________________    Interval History     Endorses back pain that is chronic and progressive over the past year. She does feel that it is worse today. Some fluctuation in reported pain over last several days. Notes ongoing left leg/foot pain and swelling. Notes new onset impaired sensation in LLE that she feels has come on over the last several days. Limited additional symptoms or concerns.       Physical Exam   Vital Signs: Temp: 98.5  F (36.9  C) Temp src: Oral BP: 131/61 Pulse: 96   Resp: 17 SpO2: 97 % O2 Device: Nasal cannula Oxygen Delivery: 2 LPM  Weight: 211 lbs 3.21 oz  General Appearance: Comfortable, nontoxic appearing female seen " laying in bed.  Eyes: PERRLA.  No conjunctival icterus.  HEENT: Atraumatic.  Respiratory: Breathing comfortably on supplemental oxygen.  Cardiovascular: normal rate, no murmurs  Lymph/Hematologic: No bruising on exposed skin.  Skin: No lesions or rashes noted on exposed skin.  Neurologic: Cranial nerves II through XII intact. 5/5 in bilateral upper extremities, 5/5 in right lower extremity, 3-4/5 in left lower extremity (hip flexion and dorsiflexion) with impaired sensation to light touch on medial aspect of left leg   Psychiatric: Mood appropriate    Medical Decision Making       55 MINUTES SPENT BY ME on the date of service doing chart review, history, exam, documentation & further activities per the note.      Data   Imaging results reviewed over the past 24 hrs:   Recent Results (from the past 24 hour(s))   CT Abdomen Pelvis w Contrast    Narrative    EXAMINATION: CT ABDOMEN PELVIS W CONTRAST, 8/24/2024 7:04 PM    TECHNIQUE:  Helical CT images from the thoracic inlet through the  symphysis pubis were obtained with contrast. Contrast dose: 93 mL  Isovue 370    COMPARISON: CT abdomen and pelvis 6/11/2024. Multiple priors.    HISTORY: please assess for bleed, acute on chronic back pain in pt w  new start AC and new LLE weakness    FINDINGS:    Lower chest:  Visualized heart unremarkable, no pericardial effusion. In the  visualized lung bases mild subsegmental atelectasis and dependent  atelectasis. No effusions, or large consolidations. Mild subpleural  reticulations/intralobular septal thickening along the lateral lower  right middle lobe (5/1).    Abdomen and pelvis:  Liver: Unremarkable  Biliary System: Unremarkable, no intrahepatic or extrahepatic biliary  ductal dilation.  Pancreas: Unremarkable  Adrenal glands: Unremarkable  Spleen: Unremarkable  Kidneys: Unremarkable. Normal symmetric cortical medullary  enhancement, no stones, hydronephrosis, or abnormal cortical deforming  masses. Normal caliber ureters.  Bladder is unremarkable, question  forming posterior bilateral bladder diverticuli.    Gastrointestinal tract: Unremarkable esophagus, and stomach. Normal  caliber small and large bowel. A few sigmoid diverticuli, no evidence  for acute diverticulitis. Mild redundancy of the sigmoid colon.  Moderate stool burden. Unremarkable appendix.    Mesentery/peritoneum/retroperitoneum: Trace physiologic fluid within  the pelvis, no large or organized abdominal fluid collections. No free  intra-abdominal air. No evidence for intra-abdominal hematomas.  Lymph nodes: No abdominal adenopathy.  Vasculature: Patent, nonaneurysmal abdominal aorta, scattered  calcified and noncalcified atherosclerotic changes. Patent branch  vasculature without focal stenosis. The portal vein, splenic vein,  superior mesenteric vein is patent without focal obstruction.  Pelvis: Unremarkable appearance of the uterus. No masses.    Bones: No significant change to the L2 superior endplate deformity  which likely represents a Schmorl's node. Lumbar spondylosis.    Soft Tissues: Unremarkable        Impression    IMPRESSION:   1. No evidence for bleeding or hematoma. No intra-abdominal fluid  collections.   2. Mild interstitial prominence/subpleural reticulations in the  lateral right middle lobe, may represent focal edema/atelectasis  versus less favored forming consolidation.  3. Sigmoid diverticulosis, no evidence for acute diverticulitis.   4. Unchanged appearance of the prominent L2 superior endplate  deformity favored to represent a prominent Schmorl's node, previously  biopsied benign lesion.   5. No additional acute findings within the chest abdomen or pelvis.           I have personally reviewed the examination and initial interpretation  and I agree with the findings.    TAVON CAMARENA MD         SYSTEM ID:  [id number]       Recent Labs   Lab 08/25/24  0807 08/25/24  0800 08/25/24  0219 08/24/24  1249 08/24/24  0839 08/23/24  1225 08/23/24  0839    WBC 7.8  --   --   --  8.1  --  7.4   HGB 11.7  --   --   --  11.5*  --  12.0   MCV 89  --   --   --  88  --  90     --   --   --  343  --  330     --   --   --  142  --  140   POTASSIUM 5.3  --   --   --  4.3  --  4.6   CHLORIDE 98  --   --   --  100  --  99   CO2 34*  --   --   --  33*  --  37*   BUN 8.1  --   --   --  10.3  --  6.8*   CR 0.53  --   --   --  0.55  --  0.50*   ANIONGAP 7  --   --   --  9  --  4*   LUIGI 9.4  --   --   --  9.1  --  8.8   * 133* 168*   < > 119*   < > 109*    < > = values in this interval not displayed.

## 2024-08-25 NOTE — PLAN OF CARE
"  Problem: Adult Inpatient Plan of Care  Goal: Plan of Care Review  Description:   VS:  /61   Pulse 96   Temp 98.5  F (36.9  C) (Oral)   Resp 17   Ht 1.676 m (5' 6\")   Wt 95.8 kg (211 lb 3.2 oz)   SpO2 97%   BMI 34.09 kg/m      O2:  O2 2 Liters NC   Output:  Continent of Bowel and Bladder / Pure Wick    Last BM:  08/24/24   Activity:  Assist 1 / Pivots to Chair / Walker / Gait Belt    Up for meals?  Yes HOB 45 Degrees    Skin:  No Issues    Pain:  Yes   CMS:  A&OX 3-4   Dressing:  None    Diet:  Moderate Consistent Carb / Regular / 60 grams CHO    LDA: Left and Right PIV SL   Equipment:  Personal Belongings    Plan:  POC   Additional Info:  Chronic Lower Back Pain   Received Medication for Control PRN  Oxycodone     1023  Tylenol            1022      Lidocaine Patches     No Relief from Pain 10/10  Pain Level 20    Diabetes Mellitus II  BG     133  BG      138    Left Leg Weakness and Numbness     COPD    RN Managed   Potassium      5.3  No replacement draw 08/26/24  Magnesium    2.0  No replacement draw 08/26/24  Phosphorus   3.9  No replacement draw 08/26/24   left ankle pain and difficulty walking      Outcome: Progressing  Goal: Patient-Specific Goal (Individualized)  Description:   Resolve  Left Ankle Weakness  and Difficulty Walking   Outcome: Progressing  Goal: Optimal Comfort and Wellbeing  Outcome: Progressing  Goal: Readiness for Transition of Care  Outcome: Progressing     Problem: Fall Injury Risk  Goal: Absence of Fall and Fall-Related Injury  Outcome: Progressing  Intervention: Promote Injury-Free Environment  Recent Flowsheet Documentation  Taken 8/25/2024 0801 by Italia Alcaraz, RN  Safety Promotion/Fall Prevention:   activity supervised   assistive device/personal items within reach   clutter free environment maintained   increased rounding and observation   increase visualization of patient   lighting adjusted   mobility aid in reach   nonskid shoes/slippers when out of bed   patient " and family education   room organization consistent   safety round/check completed   supervised activity   treat reversible contributory factors     Problem: Pain Acute  Goal: Optimal Pain Control and Function  Outcome: Progressing  Intervention: Prevent or Manage Pain  Recent Flowsheet Documentation  Taken 8/25/2024 0801 by Italia Alcaraz, RN  Sensory Stimulation Regulation:   care clustered   lighting decreased   quiet environment promoted     Problem: Comorbidity Management  Goal: Blood Glucose Levels Within Targeted Range  Outcome: Progressing   Goal Outcome Evaluation:      Plan of Care Reviewed With: patient

## 2024-08-26 VITALS
HEIGHT: 66 IN | TEMPERATURE: 98.5 F | RESPIRATION RATE: 17 BRPM | DIASTOLIC BLOOD PRESSURE: 58 MMHG | OXYGEN SATURATION: 91 % | SYSTOLIC BLOOD PRESSURE: 123 MMHG | HEART RATE: 92 BPM | WEIGHT: 206.79 LBS | BODY MASS INDEX: 33.23 KG/M2

## 2024-08-26 LAB
ANION GAP SERPL CALCULATED.3IONS-SCNC: 8 MMOL/L (ref 7–15)
BUN SERPL-MCNC: 9.6 MG/DL (ref 8–23)
CALCIUM SERPL-MCNC: 9.2 MG/DL (ref 8.8–10.4)
CHLORIDE SERPL-SCNC: 95 MMOL/L (ref 98–107)
CREAT SERPL-MCNC: 0.57 MG/DL (ref 0.51–0.95)
EGFRCR SERPLBLD CKD-EPI 2021: >90 ML/MIN/1.73M2
ERYTHROCYTE [DISTWIDTH] IN BLOOD BY AUTOMATED COUNT: 14 % (ref 10–15)
GLUCOSE BLDC GLUCOMTR-MCNC: 141 MG/DL (ref 70–99)
GLUCOSE BLDC GLUCOMTR-MCNC: 166 MG/DL (ref 70–99)
GLUCOSE BLDC GLUCOMTR-MCNC: 208 MG/DL (ref 70–99)
GLUCOSE SERPL-MCNC: 206 MG/DL (ref 70–99)
HCO3 SERPL-SCNC: 33 MMOL/L (ref 22–29)
HCT VFR BLD AUTO: 37.4 % (ref 35–47)
HGB BLD-MCNC: 11.6 G/DL (ref 11.7–15.7)
MAGNESIUM SERPL-MCNC: 1.9 MG/DL (ref 1.7–2.3)
MCH RBC QN AUTO: 27.8 PG (ref 26.5–33)
MCHC RBC AUTO-ENTMCNC: 31 G/DL (ref 31.5–36.5)
MCV RBC AUTO: 90 FL (ref 78–100)
PHOSPHATE SERPL-MCNC: 3.6 MG/DL (ref 2.5–4.5)
PLATELET # BLD AUTO: 333 10E3/UL (ref 150–450)
POTASSIUM SERPL-SCNC: 4.6 MMOL/L (ref 3.4–5.3)
RBC # BLD AUTO: 4.17 10E6/UL (ref 3.8–5.2)
SODIUM SERPL-SCNC: 136 MMOL/L (ref 135–145)
WBC # BLD AUTO: 8.1 10E3/UL (ref 4–11)

## 2024-08-26 PROCEDURE — 250N000013 HC RX MED GY IP 250 OP 250 PS 637: Performed by: HOSPITALIST

## 2024-08-26 PROCEDURE — 36415 COLL VENOUS BLD VENIPUNCTURE: CPT | Performed by: HOSPITALIST

## 2024-08-26 PROCEDURE — 250N000013 HC RX MED GY IP 250 OP 250 PS 637: Performed by: STUDENT IN AN ORGANIZED HEALTH CARE EDUCATION/TRAINING PROGRAM

## 2024-08-26 PROCEDURE — 999N000157 HC STATISTIC RCP TIME EA 10 MIN

## 2024-08-26 PROCEDURE — 83735 ASSAY OF MAGNESIUM: CPT | Performed by: HOSPITALIST

## 2024-08-26 PROCEDURE — 94640 AIRWAY INHALATION TREATMENT: CPT

## 2024-08-26 PROCEDURE — 250N000009 HC RX 250

## 2024-08-26 PROCEDURE — 99239 HOSP IP/OBS DSCHRG MGMT >30: CPT | Performed by: STUDENT IN AN ORGANIZED HEALTH CARE EDUCATION/TRAINING PROGRAM

## 2024-08-26 PROCEDURE — 85027 COMPLETE CBC AUTOMATED: CPT | Performed by: HOSPITALIST

## 2024-08-26 PROCEDURE — 82565 ASSAY OF CREATININE: CPT | Performed by: HOSPITALIST

## 2024-08-26 PROCEDURE — 250N000013 HC RX MED GY IP 250 OP 250 PS 637: Performed by: PHYSICIAN ASSISTANT

## 2024-08-26 PROCEDURE — 250N000013 HC RX MED GY IP 250 OP 250 PS 637

## 2024-08-26 PROCEDURE — 84100 ASSAY OF PHOSPHORUS: CPT | Performed by: STUDENT IN AN ORGANIZED HEALTH CARE EDUCATION/TRAINING PROGRAM

## 2024-08-26 PROCEDURE — 82374 ASSAY BLOOD CARBON DIOXIDE: CPT | Performed by: HOSPITALIST

## 2024-08-26 RX ORDER — POLYETHYLENE GLYCOL 3350 17 G/17G
17 POWDER, FOR SOLUTION ORAL DAILY
Status: DISCONTINUED | OUTPATIENT
Start: 2024-08-27 | End: 2024-08-26 | Stop reason: HOSPADM

## 2024-08-26 RX ORDER — OXYCODONE HYDROCHLORIDE 5 MG/1
5 TABLET ORAL EVERY 6 HOURS PRN
Qty: 10 TABLET | Refills: 0 | Status: SHIPPED | OUTPATIENT
Start: 2024-08-26 | End: 2024-09-02

## 2024-08-26 RX ORDER — ACETAMINOPHEN 325 MG/1
975 TABLET ORAL 3 TIMES DAILY
DISCHARGE
Start: 2024-08-26

## 2024-08-26 RX ORDER — DULOXETIN HYDROCHLORIDE 60 MG/1
60 CAPSULE, DELAYED RELEASE ORAL 2 TIMES DAILY
Status: DISCONTINUED | OUTPATIENT
Start: 2024-08-26 | End: 2024-08-26 | Stop reason: HOSPADM

## 2024-08-26 RX ORDER — LEVALBUTEROL INHALATION SOLUTION 1.25 MG/3ML
1.25 SOLUTION RESPIRATORY (INHALATION) EVERY 4 HOURS PRN
DISCHARGE
Start: 2024-08-26

## 2024-08-26 RX ORDER — CYCLOBENZAPRINE HCL 5 MG
5 TABLET ORAL 3 TIMES DAILY
DISCHARGE
Start: 2024-08-26 | End: 2024-10-02

## 2024-08-26 RX ORDER — DULOXETIN HYDROCHLORIDE 60 MG/1
60 CAPSULE, DELAYED RELEASE ORAL 2 TIMES DAILY
DISCHARGE
Start: 2024-08-26 | End: 2024-10-02

## 2024-08-26 RX ADMIN — PREGABALIN 200 MG: 100 CAPSULE ORAL at 08:30

## 2024-08-26 RX ADMIN — ASPIRIN 81 MG: 81 TABLET, COATED ORAL at 08:31

## 2024-08-26 RX ADMIN — CYCLOBENZAPRINE HYDROCHLORIDE 5 MG: 5 TABLET, FILM COATED ORAL at 13:07

## 2024-08-26 RX ADMIN — CETIRIZINE HYDROCHLORIDE 10 MG: 10 TABLET, FILM COATED ORAL at 08:31

## 2024-08-26 RX ADMIN — POLYETHYLENE GLYCOL 400 AND PROPYLENE GLYCOL 1 DROP: 4; 3 SOLUTION/ DROPS OPHTHALMIC at 13:07

## 2024-08-26 RX ADMIN — UMECLIDINIUM 1 PUFF: 62.5 AEROSOL, POWDER ORAL at 08:28

## 2024-08-26 RX ADMIN — ACETAMINOPHEN 975 MG: 325 TABLET ORAL at 13:06

## 2024-08-26 RX ADMIN — CYANOCOBALAMIN TAB 500 MCG 500 MCG: 500 TAB at 08:31

## 2024-08-26 RX ADMIN — APIXABAN 5 MG: 5 TABLET, FILM COATED ORAL at 08:30

## 2024-08-26 RX ADMIN — INSULIN ASPART 1 UNITS: 100 INJECTION, SOLUTION INTRAVENOUS; SUBCUTANEOUS at 12:07

## 2024-08-26 RX ADMIN — LOSARTAN POTASSIUM 50 MG: 50 TABLET, FILM COATED ORAL at 08:31

## 2024-08-26 RX ADMIN — OMEPRAZOLE 20 MG: 20 CAPSULE, DELAYED RELEASE ORAL at 08:30

## 2024-08-26 RX ADMIN — ATORVASTATIN CALCIUM 10 MG: 10 TABLET, FILM COATED ORAL at 08:31

## 2024-08-26 RX ADMIN — FLUTICASONE PROPIONATE 1 SPRAY: 50 SPRAY, METERED NASAL at 08:28

## 2024-08-26 RX ADMIN — DULOXETINE HYDROCHLORIDE 30 MG: 30 CAPSULE, DELAYED RELEASE ORAL at 08:31

## 2024-08-26 RX ADMIN — POLYETHYLENE GLYCOL 400 AND PROPYLENE GLYCOL 1 DROP: 4; 3 SOLUTION/ DROPS OPHTHALMIC at 16:20

## 2024-08-26 RX ADMIN — FLUTICASONE FUROATE AND VILANTEROL TRIFENATATE 1 PUFF: 200; 25 POWDER RESPIRATORY (INHALATION) at 08:27

## 2024-08-26 RX ADMIN — LEVALBUTEROL HYDROCHLORIDE 1.25 MG: 1.25 SOLUTION RESPIRATORY (INHALATION) at 08:03

## 2024-08-26 RX ADMIN — INSULIN ASPART 1 UNITS: 100 INJECTION, SOLUTION INTRAVENOUS; SUBCUTANEOUS at 08:28

## 2024-08-26 RX ADMIN — ACETAMINOPHEN 975 MG: 325 TABLET ORAL at 08:29

## 2024-08-26 RX ADMIN — CARBOXYMETHYLCELLULOSE SODIUM 1 DROP: 10 GEL OPHTHALMIC at 12:07

## 2024-08-26 RX ADMIN — POLYETHYLENE GLYCOL 400 AND PROPYLENE GLYCOL 1 DROP: 4; 3 SOLUTION/ DROPS OPHTHALMIC at 08:32

## 2024-08-26 RX ADMIN — CARBOXYMETHYLCELLULOSE SODIUM 1 DROP: 10 GEL OPHTHALMIC at 08:31

## 2024-08-26 RX ADMIN — CARBOXYMETHYLCELLULOSE SODIUM 1 DROP: 10 GEL OPHTHALMIC at 16:20

## 2024-08-26 RX ADMIN — CYCLOBENZAPRINE HYDROCHLORIDE 5 MG: 5 TABLET, FILM COATED ORAL at 08:31

## 2024-08-26 ASSESSMENT — ACTIVITIES OF DAILY LIVING (ADL)
ADLS_ACUITY_SCORE: 37

## 2024-08-26 NOTE — PROGRESS NOTES
[facility]    Medicine Progress Note - Hospitalist Service, GOLD TEAM 22    Date of Admission:  8/14/2024    Assessment & Plan   Jenn Aparicio is a 69 year old female with a history of O2 dependent COPD (2L O2 @ rest and 3L with exertion), Lung adenocarcinoma s/p RLL lobectomy (2/2016), SBRT to RUL nodule (1/2020) DMII c/b peripheral neuropathy, and HTN, HLD, who was initially admitted to [facility] on 8/14/2024 for further evaluation and treatment of acute on chronic back pain and left lower extremity pain and swelling impairing ambulation with hospital course complicated by new onset A Fib with RVR requiring ICU for pressors.     Changes Today:  - Medically ready for discharge pending placement  - Increase duloxetine to 60 mg BID for pain management  - Reduced scheduled bowel regimen to miralax daily     A fib with RVR  Hypotension, resolved  Patient developed new onset tachycardia as well as mild hypotension. EKG ordered and confirm a fib. A rapid response was subsequently called and patient received one dose of IV metoprolol with modest improvement in heart rate. Was started on amiodarone drip as well as Norepinephrine due to ongoing a fib and hypotension.  Unclear exact etiology behind a fib, no prior history. Question if due to hypovolemia as patient also noted to have new DARIO, but question if also related to known malignancy. Echo unremarkable. Patient converted back to NSR early AM on 8/17 and was able to be weaned off Norepinephrine late 8/17. Started on oral metoprolol BID and amiodarone which hav since been discontinued. Due to persistence, switched BB to Diltiazem. ANTONINA?DS?-VASc score of 4, and initially on heparin low intensity with transition to Eliquis.  - Continue eliquis  - Continue tele for now, follow BMP, magnesium levels and target potassium >4, magnesium >2. Currently in sinus on tele  - Continue losartan on 8/22  - Cardiology follow-up on discharge with  holter (orders placed in navigator)  - Monitor for need for resumption of rate control off amiodarone    DARIO, resolved  Patient noted to have DARIO on 8/16 after being found to have new onset a fib. Felt likely to be pre-renal in the setting of poor oral intake.   - resolved with IVF    Acute on chronic low back pain  Lumbosacral radiculopathy  History of chronic low back pain and imaging from 6/26/24 showed possible metastatic lesion. PTA Cymbalta, lyrica. Reported increase in symptoms on presentation. MRI of lumbar spine completed without evidence of metastatic disease, but did showed multilevel degenerative disease. Case discussed with neurosurgery who recommended outpatient follow-up.  - Pain management  - Continue PTA Medications  - Increase duloxetine to 60 mg BID from PTA 30 mg BID  - Lidocaine patch PRN  - Tylenol TID scheduled  - Flexeril TID to scheduled  - Oxycodone 5 mg q6h PRN, taper as able  - Follow up with neurosurgery as outpatient, NSG to schedule in 6 weeks     Left ankle pain and swelling  Difficulty ambulating  Presented with ~ 3 days hx of left ankle pain and swelling with inability ambulate.  Xray on admission w/o acute osseus abnormality, though with soft tissue swelling over dorsum of foot and medial ankle. LLE US w/o DVT. Possible sprain vs infection vs other. Ortho service consulted and felt no acute intervention need. No concerns of septic arthritis and conservative management planned.  - Repeat bilateral LE doppler with interval onset of swelling and left popliteal tenderness obtained on 8/21 and largely reassuring with question of compressibility of focus in the left mid calf (unclear if perforating branch but does not represent deep distal vein)  - Pain management as above  - PT/OT  - Fall precautions    New left leg weakness and numbness  Per patient, developed over last several days. Intermittent improvements in chronic back pain over same period. Focal weakness of LLE including hip  flexion, and plantar/dorsiflexion. Initially difficult to determine if pain limited vs true weakness, however, now seems more pain-limited and is endorsed as such by patient. MR lumbar spine previously without evidence of metastatic disease, but did showed multilevel degenerative disease. Per NSG, MR lumbar spine findings previously demonstrated are primarily on right with no clear correlation to current symptoms. Follow-up CT ordered without acute changes.  - Neurosurgery consulted, appreciate recommendations. No indication for surgical intervention     Severe O2 dependent COPD:   Chronic Hypoxic respiratory failure  Baseline O2 requirements of 2L at rest and 3L w/ exertion, currently on 2L.  Home medications include: PRN albuterol, duoneb, mucomyst, Trelegy ellipta, and Roflumilast.  PFT s 4/21/23. FEV1 0.55 (24%), FVC 1.36 (46 %), DLCOunc 3.68 (17%). Of note, roflumilast held on admission as patient reported taking only PRN and continues to be on hold given it can precipitate A fib.   - oxygen to keep O2 Saturation 90-92%  - Continue PTA medications (except Roflumilast)  - Follow up with Pulmonology as scheduled     Poorly controlled DMII  Hgb A1C (8.7- 7/9/24). Home medications include jaridance and weekly tirzepatide.   - HOLD PTA medications  - LSSI TID with meals and qhs  - MOD CHO diet  - BG checks TID with meals and qhs      HTN  Blood pressure mildly elevated  on admission at 150/84.  Home medications include losartan. This medication was held due to hypotension as noted above.   - Continue to hold PTA losartan for now     NSCLC, adenocarcinoma- RUL,RLL  S/p RLL lobectomy (2/2016)   Dx 1/2015. STAGE: IA2 [id number] poorly diff adeno RLL, then [id number] IA2 suspected NSCLC RUL, medically inoperable S/p RLL lobectomy (2/2016) and SBRT to RUL nodule (1/2020). Follows with Dr. Leung with recent clinic visit 7/29/24 w/ plan for 3 months follow up.  - Follow up with Oncology as scheduled    GERD  - Continue PTA  "PPI          Diet: Moderate Consistent Carb (60 g CHO per Meal) Diet  Diet  Snacks/Supplements Adult: Glucerna; With Meals    DVT Prophylaxis: Heparin SQ  Andrade Catheter: Not present  Lines: None     Cardiac Monitoring: None  Code Status: Full Code      Clinically Significant Risk Factors              # Hypoalbuminemia: Lowest albumin = 3.4 g/dL at 8/16/2024 11:21 PM, will monitor as appropriate              # DMII: A1C = 8.7 % (Ref range: <5.7 %) within past 6 months   # Obesity: Estimated body mass index is 33.38 kg/m  as calculated from the following:    Height as of this encounter: 1.676 m (5' 6\").    Weight as of this encounter: 93.8 kg (206 lb 12.7 oz).   # Moderate Malnutrition: based on nutrition assessment      # Financial/Environmental Concerns: none               Disposition Plan     Medically Ready for Discharge: Anticipated Today         The patient's care was discussed with the Patient.    Nova Tuttle MD  Hospitalist Service, GOLD TEAM [address] [place] [address]  Securely message with Vocera (more info)  Text page via [facility] Paging/Directory   See signed in provider for up to date coverage information  ______________________________________________________________________    Interval History     Endorses back pain that is chronic and progressive over the past year. She does feel that it is somewhat better today. Some fluctuation in reported pain over last several days. Notes ongoing left leg/foot pain and swelling. Notes continued numbness in medial LLE that is not progressive. Limited additional symptoms or concerns.       Physical Exam   Vital Signs: Temp: 98.2  F (36.8  C) Temp src: Oral BP: (!) 141/57 Pulse: 86   Resp: 18 SpO2: 98 % O2 Device: Nasal cannula Oxygen Delivery: 2 LPM  Weight: 206 lbs 12.66 oz  General Appearance: Comfortable, nontoxic appearing female seen laying in bed.  Eyes: PERRLA.  No conjunctival icterus.  HEENT: " Atraumatic.  Respiratory: Breathing comfortably on supplemental oxygen.  Cardiovascular: normal rate, no murmurs  Lymph/Hematologic: No bruising on exposed skin.  Skin: No lesions or rashes noted on exposed skin.  Neurologic: Cranial nerves II through XII intact. 5/5 in bilateral upper extremities, 5/5 in right lower extremity, 3-4/5 in left lower extremity (hip flexion and dorsiflexion) with impaired sensation to light touch on medial aspect of left leg   Psychiatric: Mood appropriate    Medical Decision Making       55 MINUTES SPENT BY ME on the date of service doing chart review, history, exam, documentation & further activities per the note.      Data   Imaging results reviewed over the past 24 hrs:   No results found for this or any previous visit (from the past 24 hour(s)).      Recent Labs   Lab 08/26/24  0739 08/25/24  2213 08/25/24  1746 08/25/24  1230 08/25/24  0807 08/24/24  1249 08/24/24  0839 08/23/24  1225 08/23/24  0839   WBC  --   --   --   --  7.8  --  8.1  --  7.4   HGB  --   --   --   --  11.7  --  11.5*  --  12.0   MCV  --   --   --   --  89  --  88  --  90   PLT  --   --   --   --  341  --  343  --  330   NA  --   --   --   --  139  --  142  --  140   POTASSIUM  --   --   --   --  5.3  --  4.3  --  4.6   CHLORIDE  --   --   --   --  98  --  100  --  99   CO2  --   --   --   --  34*  --  33*  --  37*   BUN  --   --   --   --  8.1  --  10.3  --  6.8*   CR  --   --   --   --  0.53  --  0.55  --  0.50*   ANIONGAP  --   --   --   --  7  --  9  --  4*   LUIGI  --   --   --   --  9.4  --  9.1  --  8.8   * 173* 152*   < > 139*   < > 119*   < > 109*    < > = values in this interval not displayed.

## 2024-08-26 NOTE — DISCHARGE SUMMARY
DISCHARGE SUMMARY    Pt discharging to: TCU  Transportation: Medical transport  AVS given and discussed: Yes, no further questions.   Medications given: Yes, discussed. No further questions.   Belongings returned: Yes, ensured all belongings packed and sent with pt. No items in security.   Comments: Escorted safely to elevators.     Vicente Prescott RN

## 2024-08-26 NOTE — PLAN OF CARE
"  Problem: Adult Inpatient Plan of Care  Goal: Plan of Care Review  Description: The Plan of Care Review/Shift note should be completed every shift.  The Outcome Evaluation is a brief statement about your assessment that the patient is improving, declining, or no change.  This information will be displayed automatically on your shift  note.  Outcome: Progressing  Flowsheets (Taken 8/25/2024 1610)  Outcome Evaluation: Pt up to the commode with walker and gait belt. Pt stated \" my left leg feels like it is heaver when i lift it\" CMS intact . Pt stable on 2l o2 c/o SOB when ambulating PRN oxy for pain continue POC  Plan of Care Reviewed With: patient  Overall Patient Progress: improving  Goal: Patient-Specific Goal (Individualized)  Description: You can add care plan individualizations to a care plan. Examples of Individualization might be:  \"Parent requests to be called daily at 9am for status\", \"I have a hard time hearing out of my right ear\", or \"Do not touch me to wake me up as it startles  me\".  Outcome: Progressing  Goal: Absence of Hospital-Acquired Illness or Injury  Intervention: Identify and Manage Fall Risk  Recent Flowsheet Documentation  Taken 8/25/2024 1610 by Debra Menjivar, RN  Safety Promotion/Fall Prevention:   activity supervised   assistive device/personal items within reach   clutter free environment maintained   increased rounding and observation   increase visualization of patient   lighting adjusted   mobility aid in reach   nonskid shoes/slippers when out of bed   patient and family education   room organization consistent   safety round/check completed   supervised activity   treat reversible contributory factors  Intervention: Prevent Skin Injury  Recent Flowsheet Documentation  Taken 8/25/2024 1610 by Debra Menjivar, RN  Body Position:   position changed independently   heels elevated  Intervention: Prevent and Manage VTE (Venous Thromboembolism) Risk  Recent Flowsheet Documentation  Taken " "8/25/2024 1610 by Debra Menjivar, RN  VTE Prevention/Management: patient refused intervention  Goal: Optimal Comfort and Wellbeing  Outcome: Progressing  Goal: Readiness for Transition of Care  Outcome: Progressing   Goal Outcome Evaluation:      Plan of Care Reviewed With: patient    Overall Patient Progress: improvingOverall Patient Progress: improving    Outcome Evaluation: Pt up to the commode with walker and gait belt. Pt stated \" my left leg feels like it is heaver when i lift it\" CMS intact . Pt stable on 2l o2 c/o SOB when ambulating PRN oxy for pain continue POC      "

## 2024-08-26 NOTE — PROGRESS NOTES
BRIEF SOCIAL WORK NOTE    Ambulance PCS form complete and faxed to Billing and Medical Records by . PCS form placed in pt's chart.    EVERETT Bledsoe, LGSW  Coverage   [facility]  [email or web address]

## 2024-08-26 NOTE — PLAN OF CARE
Physical Therapy Discharge Summary    Reason for therapy discharge:    Discharged to transitional care facility.    Progress towards therapy goal(s). See goals on Care Plan in [facility] electronic health record for goal details.  Goals partially met.  Barriers to achieving goals:   limited tolerance for therapy.    Therapy recommendation(s):    Continued therapy is recommended.  Rationale/Recommendations:  to progress safety and independence with functional mobility prior to returning home.

## 2024-08-26 NOTE — PLAN OF CARE
Goal Outcome Evaluation:      Plan of Care Reviewed With: patient    Overall Patient Progress: improvingOverall Patient Progress: improving     Neuro:Patient A&Ox4,      Cardiac:  bp- wdl     Resp: On 2l of oxygen via NC baseline     Pain: Controlled with Scheduled Tylenol, prn Oxyodone      Skin: no new skin issue noted     Lines: 2 PIVs          Plan: continue plan of care

## 2024-08-26 NOTE — PROGRESS NOTES
Care Management Discharge Note    Discharge Date:  8/26/2024     Discharge Disposition: Transitional Care  [facility]onia Lugo at [place], [address], [place], [place]    Discharge Services:  (Post Acute Therapies)    Discharge DME:  SNF to provide all necessary DME including oxygen    Discharge Transportation: [facility]  Stretcher ride scheduled due to back pain, unable to support self for ride in w/c, and oxygen needs. Ride scheduled for 8/26 between 3:09 - 3:54 pm.  Ambulance PCS form completed and placed in pt chart.    Private pay costs discussed: transportation costs    Does the patient's insurance plan have a 3 day qualifying hospital stay waiver?  No    PAS Confirmation Code: [id number]  Patient/family educated on Medicare website which has current facility and service quality ratings: yes    Education Provided on the Discharge Plan: Yes  Persons Notified of Discharge Plans: Patient and TCU facility  Patient/Family in Agreement with the Plan: yes    Handoff Referral Completed: Yes    Additional Information:    Patient is medically ready for discharge today to TCU.    called Samir Liajulien ([facility]) # [phone] to confirm that facility can accept today. Yes. Facility can accept. Orders printed and faxed to facility.   Stretcher ride ([employer]) scheduled for today between 3:09 - 3:54 pm.       EVERETT Bledsoe, LGSW  Coverage   [facility]  [email or web address]

## 2024-08-26 NOTE — PROGRESS NOTES
CLINICAL NUTRITION SERVICES - BRIEF NOTE     Nutrition Prescription    Malnutrition Status:    Moderate malnutrition in the context of acute illness    Recommendations already ordered by Registered Dietitian (RD):  - Contacted provider to include glucerna TID w/ meals with discharge orders for facility.     Future/Additional Recommendations:  Anticipate discharge today.     Per care rounds, pt able to discharge today.     EVALUATION OF THE PROGRESS TOWARD GOALS   Diet: Moderate consistent Carb (60 g CHO/meal)  ONS: chocolate Glucerna TID w/ meals.   Intake: Unable to meet with pt on Friday.  Today we spoke and she stated that she was drinking the glucerna with each meal.  This improves her overall intake from that reported by nursing over the past week.         NEW FINDINGS   Updated weights from last week  08/26/24 0500 93.8 kg (206 lb 12.7 oz) Bed scale   08/25/24 0500 95.8 kg (211 lb 3.2 oz) Bed scale   08/23/24 0634 58.9 kg (129 lb 13.6 oz) -- presume error.   08/22/24 0606 96.8 kg (213 lb 6.1 oz) Bed scale   08/20/24 2000 96.1 kg (211 lb 13.8 oz) Bed scale   08/20/24 0000 91.7 kg (202 lb 2.6 oz) Bed scale   08/19/24 0115 94.2 kg (207 lb 10.8 oz) Bed scale   08/17/24 0100 91 kg (200 lb 9.9 oz) Bed scale   08/16/24 0830 92.9 kg (204 lb 12.9 oz) --   08/15/24 0741 92 kg (202 lb 13.2 oz) Bed scale   08/14/24 2116 87.4 kg (192 lb 10.9 oz) Bed scale     Weight reassessment with updated labs:  bed scale weights fluctuating from day to day--suspect related to variable weight on bed more so than significant fluid shifts based on RN documentation of edema.       MALNUTRITION(updated w/ new info)  % Intake: < 75% for > 7 days  % Weight Loss: Unable to assess-variable bed scale weights.  Subcutaneous Fat Loss: None observed 8/26  Muscle Loss: biceps/triceps-at least mild, R calf--at least mild (difficult to assess with overlying adipose tissue)  Fluid Accumulation/Edema: Mild  Malnutrition Diagnosis: Moderate  "malnutrition in the context of acute illness    INTERVENTIONS  See above.     Monitoring/Evaluation  Progress toward goals will be monitored and evaluated per protocol.     Rani Bray RD, LD   6 & 8 Med/Surg RD  Mon-Fri Vocera contact: By name, or \"6 [OR] 8 Med Surg Clinical Dietitian\"  Weekend RD Vocera: \"Weekend Holiday Clinical Dietitian\"        "

## 2024-08-27 ENCOUNTER — TELEPHONE (OUTPATIENT)
Dept: GASTROENTEROLOGY | Facility: CLINIC | Age: 69
End: 2024-08-27
Payer: COMMERCIAL

## 2024-08-27 ENCOUNTER — PATIENT OUTREACH (OUTPATIENT)
Dept: CARE COORDINATION | Facility: CLINIC | Age: 69
End: 2024-08-27
Payer: COMMERCIAL

## 2024-08-27 NOTE — DISCHARGE SUMMARY
"[facility]  Hospitalist Discharge Summary      Date of Admission:  8/14/2024  Date of Discharge:  8/26/2024  7:00 PM  Discharging Provider: Nova Tuttle MD  Discharge Service: Hospitalist Service, GOLD TEAM 22    Discharge Diagnoses     A fib with RVR  Hypotension, resolved  DARIO, resolved  Acute on chronic low back pain  Lumbosacral radiculopathy  Left ankle pain and swelling  Difficulty ambulating  New left leg weakness and numbness  Severe O2 dependent COPD on 2 L NC at baseline  Chronic Hypoxic respiratory failure  Poorly controlled DMII  Malnutrition  HTN  NSCLC, adenocarcinoma- RUL,RLL  S/p RLL lobectomy (2/2016)  GERD    Clinically Significant Risk Factors     # DMII: A1C = 8.7 % (Ref range: <5.7 %) within past 6 months    # Obesity: Estimated body mass index is 33.38 kg/m  as calculated from the following:    Height as of this encounter: 1.676 m (5' 6\").    Weight as of this encounter: 93.8 kg (206 lb 12.7 oz).    # Moderate Malnutrition: based on nutrition assessment      Follow-ups Needed After Discharge   Follow-up Appointments     Follow Up and recommended labs and tests      Follow up with Nursing home physician.  No follow up labs or test are   needed.  Please follow up with Neurosurgery as outpatient            Unresulted Labs Ordered in the Past 30 Days of this Admission       No orders found from 7/15/2024 to 8/15/2024.        These results will be followed up by N/A    Discharge Disposition   Discharged to rehabilitation facility  Condition at discharge: Stable    Hospital Course   Jenn Aparicio is a 69 year old female with a history of O2 dependent COPD (2L O2 @ rest and 3L with exertion), Lung adenocarcinoma s/p RLL lobectomy (2/2016), SBRT to RUL nodule (1/2020) DMII c/b peripheral neuropathy, and HTN, HLD, who was initially admitted to [facility] on 8/14/2024 for further evaluation and treatment of acute on chronic back pain and left lower " extremity pain and swelling impairing ambulation with hospital course complicated by new onset A Fib with RVR requiring ICU for pressors.     A fib with RVR  Hypotension, resolved  Patient developed new onset tachycardia as well as mild hypotension. EKG ordered and confirm a fib. A rapid response was subsequently called and patient received one dose of IV metoprolol with modest improvement in heart rate. Was started on amiodarone drip as well as Norepinephrine due to ongoing a fib and hypotension.  Unclear exact etiology behind a fib, no prior history. Question if due to hypovolemia as patient also noted to have new DARIO, but question if also related to known malignancy. Echo unremarkable. Patient converted back to NSR early AM on 8/17 and was able to be weaned off Norepinephrine late 8/17. Started on oral metoprolol BID and amiodarone which hav since been discontinued. Due to persistence, switched BB to Diltiazem. ANTONINA?DS?-VASc score of 4, and initially on heparin low intensity with transition to Eliquis.  - Continue eliquis as started on this hospitalization  - Continue losartan   - Cardiology follow-up on discharge with holter   - Monitor for need for resumption of rate control off amiodarone    DARIO, resolved  Patient noted to have DARIO on 8/16 after being found to have new onset a fib. Felt likely to be pre-renal in the setting of poor oral intake. Resolved with IVF    Acute on chronic low back pain  Lumbosacral radiculopathy  History of chronic low back pain and imaging from 6/26/24 showed possible metastatic lesion. PTA Cymbalta, lyrica. Reported increase in symptoms on presentation. MRI of lumbar spine completed without evidence of metastatic disease, but did showed multilevel degenerative disease. Case discussed with neurosurgery who recommended outpatient follow-up.  - Pain management  - Continue PTA Medications  - Increase duloxetine to 60 mg BID from PTA 30 mg BID  - Lidocaine patch PRN  - Tylenol TID  scheduled  - Flexeril TID to scheduled  - Oxycodone 5 mg q6h PRN, taper as able  - Follow up with neurosurgery as outpatient, NSG to schedule in 6 weeks     Left ankle pain and swelling  Difficulty ambulating  Presented with ~ 3 days hx of left ankle pain and swelling with inability ambulate.  Xray on admission w/o acute osseus abnormality, though with soft tissue swelling over dorsum of foot and medial ankle. LLE US w/o DVT. Possible sprain vs infection vs other. Ortho service consulted and felt no acute intervention need. No concerns of septic arthritis and conservative management planned.  - Repeat bilateral LE doppler with interval onset of swelling and left popliteal tenderness obtained on 8/21 and largely reassuring with question of compressibility of focus in the left mid calf (unclear if perforating branch but does not represent deep distal vein)  - Pain management as above  - PT/OT    New left leg weakness and numbness  Per patient, developed over last several days. Intermittent improvements in chronic back pain over same period. Focal weakness of LLE including hip flexion, and plantar/dorsiflexion. Initially difficult to determine if pain limited vs true weakness, however, now seems more pain-limited and is endorsed as such by patient. MR lumbar spine previously without evidence of metastatic disease, but did showed multilevel degenerative disease. Per NSG, MR lumbar spine findings previously demonstrated are primarily on right with no clear correlation to current symptoms. Follow-up CT ordered without acute changes.  - Neurosurgery consulted, appreciate recommendations. No indication for surgical intervention. Follow-up as above     Severe O2 dependent COPD on 2 L NC at baseline  Chronic Hypoxic respiratory failure  Baseline O2 requirements of 2L at rest and 3L w/ exertion, currently on 2L.  Home medications include: PRN albuterol, duoneb, mucomyst, Trelegy ellipta, and Roflumilast.  PFT s 4/21/23. FEV1  0.55 (24%), FVC 1.36 (46 %), DLCOunc 3.68 (17%). Of note, roflumilast held on admission as patient reported taking only PRN and continues to be on hold given it can precipitate A fib.   - Continue PTA medications (except Roflumilast)  - Follow up with Pulmonology as scheduled     Poorly controlled DMII  Hgb A1C (8.7- 7/9/24). Home medications include jaridance and weekly tirzepatide.   - sliding scale insulin discontinued and PTA medications resumed at discharge    Malnutrition  - Glucerna TID     HTN  Blood pressure mildly elevated  on admission at 150/84.  Home medications include losartan. This medication was held due to hypotension as noted above.   - Continue to hold PTA losartan for now     NSCLC, adenocarcinoma- RUL,RLL  S/p RLL lobectomy (2/2016)   Dx 1/2015. STAGE: IA2 [id number] poorly diff adeno RLL, then [id number] IA2 suspected NSCLC RUL, medically inoperable S/p RLL lobectomy (2/2016) and SBRT to RUL nodule (1/2020). Follows with Dr. Leung with recent clinic visit 7/29/24 w/ plan for 3 months follow up.  - Follow up with Oncology as scheduled    GERD  - Continue PTA PPI    Consultations This Hospital Stay   PHYSICAL THERAPY ADULT IP CONSULT  OCCUPATIONAL THERAPY ADULT IP CONSULT  ORTHOPAEDIC SURGERY ADULT/PEDS IP CONSULT  CARE MANAGEMENT / SOCIAL WORK IP CONSULT  NUTRITION SERVICES ADULT IP CONSULT  PHARMACY IP CONSULT  PHARMACY LIAISON FOR MEDICATION COVERAGE CONSULT  SPIRITUAL HEALTH SERVICES IP CONSULT  NEUROSURGERY ADULT IP CONSULT    Code Status   Full Code    Time Spent on this Encounter   I, Nova Tuttle MD, personally saw the patient today and spent greater than 30 minutes discharging this patient.       Nova Tuttle MD  [facility] MED SURG  [facility] [address]  Phone: [phone]  Fax: [phone]  ______________________________________________________________________    Physical Exam   Vital Signs: Temp: 98.5  F (36.9  C)  Temp src: Oral BP: 123/58 Pulse: 92   Resp: 17 SpO2: 91 % O2 Device: Nasal cannula Oxygen Delivery: 4 LPM  Weight: 206 lbs 12.66 oz  General Appearance: Comfortable, nontoxic appearing female seen sitting supported in bed.  Eyes: PERRLA.  No conjunctival icterus.  HEENT: Atraumatic.  Respiratory: Breathing comfortably on supplemental oxygen.  Cardiovascular: normal rate, no murmurs  Lymph/Hematologic: No bruising on exposed skin.  Skin: No lesions or rashes noted on exposed skin.  Psychiatric: Mood appropriate       Primary Care Physician   Mitzi Nettles    Discharge Orders      Adult Neurosurgery  Referral      Primary Care - Care Coordination Referral      Adult Cardiology Eval [facility] Referral      General info for SNF    Length of Stay Estimate: Short Term Care: Estimated # of Days <30  Condition at Discharge: Stable  Level of care:skilled   Rehabilitation Potential: Good  Admission H&P remains valid and up-to-date: Yes  Recent Chemotherapy: N/A  Use Nursing Home Standing Orders: Yes     Follow Up and recommended labs and tests    Follow up with Nursing home physician.  No follow up labs or test are needed.  Please follow up with Neurosurgery as outpatient     Reason for your hospital stay    You were admitted for acute back pain     Activity - Up ad betsey     Oxygen (SNF/TCU) Discharge     Diet    Follow this diet upon discharge: Orders Placed This Encounter      Moderate Consistent Carb (60 g CHO per Meal) Diet     ZIO PATCH MAIL OUT       Significant Results and Procedures   Most Recent 3 CBC's:  Recent Labs   Lab Test 08/26/24  0951 08/25/24  0807 08/24/24  0839   WBC 8.1 7.8 8.1   HGB 11.6* 11.7 11.5*   MCV 90 89 88    341 343     Most Recent 3 BMP's:  Recent Labs   Lab Test 08/26/24  1722 08/26/24  1150 08/26/24  0951 08/25/24  1230 08/25/24  0807 08/24/24  1249 08/24/24  0839   NA  --   --  136  --  139  --  142   POTASSIUM  --   --  4.6  --  5.3  --  4.3   CHLORIDE  --   --  95*  --   98  --  100   CO2  --   --  33*  --  34*  --  33*   BUN  --   --  9.6  --  8.1  --  10.3   CR  --   --  0.57  --  0.53  --  0.55   ANIONGAP  --   --  8  --  7  --  9   LUIGI  --   --  9.2  --  9.4  --  9.1   * 166* 206*   < > 139*   < > 119*    < > = values in this interval not displayed.     Most Recent 2 LFT's:  Recent Labs   Lab Test 08/16/24  2321 05/15/24  1024   AST 21 19   ALT 6 12   ALKPHOS 89 92   BILITOTAL 0.6 0.3   ,   Results for orders placed or performed during the hospital encounter of 08/14/24   XR Ankle Left G/E 3 Views    Narrative    3 views left ankle radiographs 8/14/2024 3:09 PM    History: fall, pain    Comparison: No similar prior imaging    Findings:    AP, oblique, and lateral  views of the left ankle were obtained.     No acute osseous abnormality.      Ankle mortise and syndesmosis are congruent on these non weight  bearing images.    Achilles tendon insertional and plantar calcaneal enthesopathy.     Soft tissue swelling over the dorsum of the foods and medial ankle.      Impression    Impression:  1. No acute osseous abnormality.  2. Soft tissue swelling over the dorsum of the foods and medial ankle.    I have personally reviewed the examination and initial interpretation  and I agree with the findings.    ISRAEL MCFARLAND MD         SYSTEM ID:  [id number]   US Lower Extremity Venous Duplex Bilateral    Narrative    EXAMINATION: DOPPLER VENOUS ULTRASOUND OF BILATERAL LOWER EXTREMITIES,  8/14/2024 5:33 PM     COMPARISON: None.    HISTORY: leg pain and swelling    TECHNIQUE:  Gray-scale evaluation with compression, spectral flow and  color Doppler assessment of the deep venous system of both legs from  groin to knee, and then at the ankles.    FINDINGS:  In both lower extremities, the common femoral, femoral, popliteal and  posterior tibial veins demonstrate normal compressibility and blood  flow.      Impression    IMPRESSION:  1.  No evidence of deep venous thrombosis in either lower  extremity.    I have personally reviewed the examination and initial interpretation  and I agree with the findings.    CHARLOTTE KAPADIA DO         SYSTEM ID:  [id number]   MR Lumbar Spine w/o & w Contrast    Narrative    EXAM: MR LUMBAR SPINE W/O & W CONTRAST  8/15/2024 7:16 PM     HISTORY:  Metastatic spinal disease, with worsening low back pain,  with radiation to left leg, left leg weakness       COMPARISON:  MRI lumbar spine 6/26/2024, PET/CT 6/11/2024 and biopsy  images from 7/22/2024.    TECHNIQUE: Sagittal T1-weighted and T2-weighted and axial T2-weighted  images of the lumbar spine were obtained without intravenous contrast.  Post intravenous contrast using gadolinium axial and sagittal  T1-weighted images were obtained with fat saturation.    CONTRAST: 9.2ml gadavist.    FINDINGS:  There are 5 lumbar-type vertebrae, used for the purposes of this  dictation. No acute fractures.  The tip of the conus is approximately  at the level of L1. Normal cauda equina. No leptomeningeal  enhancement.    Stable grade 1 anterolisthesis L4 on L5. Decreased conspicuity and  enhancement of Schmorl nodes in the superior endplate of L2 with  peripheral enhancement and bone marrow edema compared with MRI from  6/26/2024. Note that this was previously biopsied and the biopsy was  negative for metastatic disease.    On a level by level basis, the findings are as follows:  T11-12: No spinal canal or neural foraminal stenosis. No significant  degenerative changes.    T12-L1: No spinal canal or neural foraminal stenosis. No significant  degenerative changes.    L1-2: No spinal canal or neural foraminal stenosis. Mild facet  arthropathy.    L2-3: Disc bulge and facet hypertrophy with mild spinal canal  narrowing. No neural foraminal stenosis.    L3-4: Disc bulge and facet hypertrophy. Mild spinal canal narrowing.  No foraminal stenosis.    L4-5: Grade 1 anterolisthesis. Superimposed disc bulge and facet  hypertrophy. Thickening of the  ligamentum flavum. Resultant moderate  spinal canal stenosis, moderate right and mild left neural foraminal  stenosis.    L5-S1: Disc bulge and facet hypertrophy. No foraminal or spinal canal  stenosis.    The visualized paraspinous soft tissues are within normal limits.     Prominence of the anterior epidural venous plexus at this L3 and L4  levels, similar to prior, likely due to anterolisthesis and spinal  canal narrowing at L4-5 level.      Impression    IMPRESSION:  1. Stable to decreased conspicuity of previously biopsy-proven mildly  inflamed Schmorl node in the superior endplate of L2 endplates. No  evidence of metastatic disease in the lumbar spine. No leptomeningeal  enhancement.  2. Multilevel degenerative findings as detailed above. Most  significantly there is grade 1 anterolisthesis, degenerative disc  disease and facet arthropathy at L4-5 resulting in moderate spinal  canal stenosis and moderate right neural foraminal stenosis. The  anterior epidural venous plexus is prominent at this level, likely due  to anterolisthesis and spinal canal narrowing.    I have personally reviewed the examination and initial interpretation  and I agree with the findings.    ADRIA CRAWFORD MD         SYSTEM ID:  [id number]   XR Chest Port 1 View    Narrative    Exam: XR CHEST PORT 1 VIEW, 8/17/2024 8:40 AM    Comparison: CT chest abdomen and pelvis 5/15/2024    History: cough, rule out new/worsening infiltrate    Findings:  Single portable semiupright view of the chest. Trachea is midline.  Cardiomediastinal silhouette is within normal limits. Streaky  densities particularly over the right upper lung zone corresponding  with findings seen on prior CT. No definite new focal consolidation.  No pneumothorax or pleural effusion. The visualized upper abdomen is  unremarkable. No acute osseous abnormalities.      Impression    Impression: No definite new focal consolidation. There are streaky  densities projecting over the right  upper lung zone corresponding with  fibrosis and architectural distortion seen on prior CT.    I have personally reviewed the examination and initial interpretation  and I agree with the findings.    EVAN PANDA MD         SYSTEM ID:  [id number]    Lower Extremity Venous Duplex Bilateral    Narrative    EXAMINATION: DOPPLER VENOUS ULTRASOUND OF BILATERAL LOWER EXTREMITIES,  8/21/2024 5:41 PM     COMPARISON: 12/8/2021    HISTORY: Lower extremity swelling, assess for clot    TECHNIQUE:  Gray-scale evaluation with compression, spectral flow and  color Doppler assessment of the deep venous system of both legs from  groin to knee, and then at the ankles.    FINDINGS:  In both lower extremities, the common femoral, superficial femoral,  deep profunda, popliteal, posterior tibial, and peroneal veins  demonstrate normal compressibility and blood flow. There appears to be  a focal vessel at the level of the left mid calf that is  noncompressible and cannot be followed proximally by the technologist.  After further discussion, the technologist thinks this may represent a  small  branch, as it does not have an additional apparent  vein such as the gastrocnemius.      Impression    IMPRESSION:  No definite evidence of deep venous thrombosis in either lower  extremity. At the level of the left mid calf there is a focal vessel  that appears noncompressible. It is unclear if this represents a small  perforating branch, although it cannot be followed proximally and does  not represent one of the distal deep veins such as the posterior  tibial or gastrocnemius. Could consider short-term follow-up  assessment to document stability/resolution.    I have personally reviewed the examination and initial interpretation  and I agree with the findings.    LUIS ANTONIO MEDEL DO         SYSTEM ID:  [id number]   CT Head w/o Contrast    Narrative    EXAM: CT HEAD W/O CONTRAST  8/23/2024 1:23 PM     HISTORY:  left leg weakness, pt on ac.  r/o bleed       COMPARISON:  CT head 2/7/2008    TECHNIQUE: Using multidetector thin collimation helical acquisition  technique, axial, coronal and sagittal CT images from the skull base  to the vertex were obtained without intravenous contrast.   (topogram) image(s) also obtained and reviewed.    FINDINGS:  No acute intracranial hemorrhage, mass effect, or midline shift. No  acute loss of gray-white matter differentiation in the cerebral  hemispheres. Ventricles are proportionate to the cerebral sulci. Clear  basal cisterns. There is mild cerebral atrophy.    The bony calvaria and the bones of the skull base are normal. The  visualized portions of the paranasal sinuses and mastoid air cells are  clear. Grossly normal orbits.       Impression    IMPRESSION: No acute intracranial pathology.     I have personally reviewed the examination and initial interpretation  and I agree with the findings.    BARBARA LEWIS MD         SYSTEM ID:  [id number]   XR Ankle Left G/E 3 Views    Narrative    EXAM: XR ANKLE LEFT G/E 3 VIEWS  LOCATION: [facility]  DATE: 8/23/2024    INDICATION: follow up imaging of foot with ongoing swelling  COMPARISON: 8/14/2024       Impression    IMPRESSION:      Negative for left ankle fracture or dislocation. No joint space narrowing. No osseous erosions. There is redemonstrated soft tissue swelling over the ankle.   CT Abdomen Pelvis w Contrast    Narrative    EXAMINATION: CT ABDOMEN PELVIS W CONTRAST, 8/24/2024 7:04 PM    TECHNIQUE:  Helical CT images from the thoracic inlet through the  symphysis pubis were obtained with contrast. Contrast dose: 93 mL  Isovue 370    COMPARISON: CT abdomen and pelvis 6/11/2024. Multiple priors.    HISTORY: please assess for bleed, acute on chronic back pain in pt w  new start AC and new LLE weakness    FINDINGS:    Lower chest:  Visualized heart unremarkable, no pericardial effusion. In the  visualized lung bases  mild subsegmental atelectasis and dependent  atelectasis. No effusions, or large consolidations. Mild subpleural  reticulations/intralobular septal thickening along the lateral lower  right middle lobe (5/1).    Abdomen and pelvis:  Liver: Unremarkable  Biliary System: Unremarkable, no intrahepatic or extrahepatic biliary  ductal dilation.  Pancreas: Unremarkable  Adrenal glands: Unremarkable  Spleen: Unremarkable  Kidneys: Unremarkable. Normal symmetric cortical medullary  enhancement, no stones, hydronephrosis, or abnormal cortical deforming  masses. Normal caliber ureters. Bladder is unremarkable, question  forming posterior bilateral bladder diverticuli.    Gastrointestinal tract: Unremarkable esophagus, and stomach. Normal  caliber small and large bowel. A few sigmoid diverticuli, no evidence  for acute diverticulitis. Mild redundancy of the sigmoid colon.  Moderate stool burden. Unremarkable appendix.    Mesentery/peritoneum/retroperitoneum: Trace physiologic fluid within  the pelvis, no large or organized abdominal fluid collections. No free  intra-abdominal air. No evidence for intra-abdominal hematomas.  Lymph nodes: No abdominal adenopathy.  Vasculature: Patent, nonaneurysmal abdominal aorta, scattered  calcified and noncalcified atherosclerotic changes. Patent branch  vasculature without focal stenosis. The portal vein, splenic vein,  superior mesenteric vein is patent without focal obstruction.  Pelvis: Unremarkable appearance of the uterus. No masses.    Bones: No significant change to the L2 superior endplate deformity  which likely represents a Schmorl's node. Lumbar spondylosis.    Soft Tissues: Unremarkable        Impression    IMPRESSION:   1. No evidence for bleeding or hematoma. No intra-abdominal fluid  collections.   2. Mild interstitial prominence/subpleural reticulations in the  lateral right middle lobe, may represent focal edema/atelectasis  versus less favored forming consolidation.  3.  Sigmoid diverticulosis, no evidence for acute diverticulitis.   4. Unchanged appearance of the prominent L2 superior endplate  deformity favored to represent a prominent Schmorl's node, previously  biopsied benign lesion.   5. No additional acute findings within the chest abdomen or pelvis.           I have personally reviewed the examination and initial interpretation  and I agree with the findings.    TAVON CAMARENA MD         SYSTEM ID:  [id number]   Echo Complete     Value    LVEF  > 65%    Narrative    [id number]  [id number]  [id number]  [id number]^MARY GRACE^ABI^NOEMI     [facility],[place]  Echocardiography Laboratory  [address] [place] [id number]     Name: JOHN POOLE  MRN: [id number]  : 1955  Study Date: 2024 10:44 AM  Age: 69 yrs  Gender: Female  Patient Location: [facility]  Reason For Study: Shock  Ordering Physician: ABI BRODY  Performed By: Renetta Tejeda RDCS     BSA: 2.0 m2  Height: 66 in  Weight: 200 lb  BP: 121/52 mmHg  ______________________________________________________________________________  Procedure  Echocardiogram with two-dimensional, color and spectral Doppler performed.  Technically difficult study.  ______________________________________________________________________________  Interpretation Summary  Left ventricular function is normal.The ejection fraction is > 65%.  Global right ventricular function is normal.  Mild aortic insufficiency.  The inferior vena cava is normal.  No pericardial effusion.  ______________________________________________________________________________  Left Ventricle  Left ventricular function is normal.The ejection fraction is > 65%. Left  ventricular wall thickness cannot evaluate. Left ventricular size is normal.  Left ventricular diastolic function is normal. No regional wall motion  abnormalities are seen.     Right Ventricle  The right ventricle is normal size. Global right ventricular function is  normal.      Atria  Both atria appear normal.     Mitral Valve  The mitral valve is normal.     Aortic Valve  The valve leaflets are not well visualized. Mild aortic insufficiency is  present.     Tricuspid Valve  The tricuspid valve is normal. The peak velocity of the tricuspid regurgitant  jet is not obtainable. Pulmonary artery systolic pressure cannot be assessed.     Pulmonic Valve  The valve leaflets are not well visualized. On Doppler interrogation, there is  no significant stenosis or regurgitation.     Vessels  Normal diameter aortic root and proximal ascending aorta. The inferior vena  cava is normal.     Pericardium  No pericardial effusion is present.     Compared to Previous Study  This study was compared with the study from 22 . No significant changes  noted.  ______________________________________________________________________________  MMode/2D Measurements & Calculations  LVOT diam: 2.1 cm  LVOT area: 3.5 cm2  LA Volume (BP): 30.4 ml  LA Volume Index (BP): 15.2 ml/m2  TAPSE: 2.0 cm     Doppler Measurements & Calculations  MV E max manan: 65.6 cm/sec  MV A max manan: 68.1 cm/sec  MV E/A: 0.96  MV dec slope: 316.0 cm/sec2  MV dec time: 0.21 sec  AI P1/2t: 548.4 msec     E/E' av.4  Lateral E/e': 7.7  Medial E/e': 9.1     ______________________________________________________________________________  Report approved by: Emilio Calvo 2024 01:07 PM             Discharge Medications   Discharge Medication List as of 2024  3:13 PM        START taking these medications    Details   apixaban ANTICOAGULANT (ELIQUIS) 5 MG tablet Take 1 tablet (5 mg) by mouth 2 times daily, Transitional      levalbuterol (XOPENEX) 1.25 MG/3ML neb solution Take 3 mLs (1.25 mg) by nebulization every 4 hours as needed for wheezing., Transitional      menthol (ICY HOT) 5 % PTCH Apply 2 patches over 8 hours topically every 8 hours as needed for muscle soreness., Transitional      oxyCODONE (ROXICODONE) 5 MG tablet  Take 0.5-1 tablets (2.5-5 mg) by mouth every 4 hours as needed for moderate to severe pain (IF pain not managed with non-pharmacological and non-opioid interventions), Disp-30 tablet, R-0, Local Print      polyethylene glycol (MIRALAX) 17 GM/Dose powder Take 17 g by mouth daily, Transitional           CONTINUE these medications which have CHANGED    Details   acetaminophen (TYLENOL) 325 MG tablet Take 3 tablets (975 mg) by mouth 3 times daily., Transitional      !! cyclobenzaprine (FLEXERIL) 5 MG tablet Take 1 tablet (5 mg) by mouth 3 times daily., Transitional      !! pregabalin (LYRICA) 200 MG capsule Take 1 capsule (200 mg) by mouth 2 times daily for 30 days, Disp-60 capsule, R-0, Local Print       !! - Potential duplicate medications found. Please discuss with provider.        CONTINUE these medications which have NOT CHANGED    Details   acetylcysteine (MUCOMYST) 10 % nebulizer solution Inhale 4 mLs into the lungs 4 times daily as needed for mucolysis/respiratory distress, Disp-30 mL, R-5, E-Prescribe      albuterol (PROAIR HFA/PROVENTIL HFA/VENTOLIN HFA) 108 (90 Base) MCG/ACT inhaler USE 1 OR 2 INHALATIONS EVERY 4 HOURS AS NEEDED, Disp-17 g, R-11, E-PrescribePharmacy may dispense brand covered by insurance (Proair, or proventil or ventolin or generic albuterol inhaler)      albuterol (PROVENTIL) (2.5 MG/3ML) 0.083% neb solution Take 1 vial (2.5 mg) by nebulization 4 times daily, Disp-360 mL, R-5, E-Prescribe      alpha-lipoic acid 600 MG capsule Take 1 capsule (600 mg) by mouth daily, Disp-90 capsule, R-3, E-Prescribe      aspirin 81 MG EC tablet Take 1 tablet (81 mg) by mouth every morning, Disp-90 tablet, R-3, E-Prescribe      atorvastatin (LIPITOR) 10 MG tablet Take 1 tablet (10 mg) by mouth daily, Disp-90 tablet, R-3, E-Prescribe      carboxymethylcellulose (REFRESH LIQUIGEL) 1 % ophthalmic solution Place 1 drop into both eyes 4 times daily, Disp-15 mL, R-11, E-Prescribe      cetirizine (ZYRTEC) 10 MG  tablet Take 1 tablet (10 mg) by mouth every morning, Disp-90 tablet, R-3, E-Prescribe      cyanocobalamin (VITAMIN B-12) 500 MCG tablet Take 1 tablet (500 mcg) by mouth daily, Disp-90 tablet, R-3, E-Prescribe      !! cyclobenzaprine (FLEXERIL) 5 MG tablet Take 1 tablet (5 mg) by mouth 3 times daily as needed for muscle spasms, Disp-30 tablet, R-1, E-Prescribe      empagliflozin (JARDIANCE) 25 MG TABS tablet Take 1 tablet (25 mg) by mouth every morning, Disp-90 tablet, R-3, E-Prescribe      fluticasone (FLONASE) 50 MCG/ACT nasal spray Spray 1 spray into both nostrils 2 times daily Use at night before bed, Disp-15.8 mL, R-3, E-Prescribe      Fluticasone-Umeclidin-Vilanterol (TRELEGY ELLIPTA) 200-62.5-25 MCG/ACT oral inhaler USE 1 INHALATION DAILY, Disp-28 each, R-5, E-Prescribe      GLUCOSAMINE-CHONDROITIN -400 MG tablet TAKE 1 TABLET DAILY, Disp-90 tablet, R-3, E-Prescribe      losartan (COZAAR) 50 MG tablet Take 1 tablet (50 mg) by mouth daily, Disp-90 tablet, R-3, E-Prescribe      omega-3 acid ethyl esters (LOVAZA) 1 g capsule Take 2 capsules (2 g) by mouth 2 times daily, Disp-360 capsule, R-3, E-Prescribe      omeprazole (PRILOSEC) 20 MG DR capsule Take 1 capsule (20 mg) by mouth daily, Disp-90 capsule, R-3, E-PrescribePatient takes 40 mg      polyethylene glycol-propylene glycol (SYSTANE) 0.4-0.3 % SOLN ophthalmic solution Place 1 drop into both eyes 4 times daily, Disp-5 mL, R-11, E-Prescribe      !! pregabalin (LYRICA) 150 MG capsule Take 1 capsule (150 mg) by mouth 2 times daily TAKE 1 CAPSULE(150 MG) BY MOUTH TWICE DAILY, Disp-180 capsule, R-3, E-Prescribe      roflumilast (DALIRESP) 500 MCG TABS tablet Take 1 tablet (500 mcg) by mouth every morning, Disp-90 tablet, R-3, E-Prescribe      bisacodyl (DULCOLAX) 5 MG EC tablet Two days prior to exam take two (2) tablets at 4pm. One day prior to exam take two (2) tablets at 4pm, Disp-4 tablet, R-0, E-Prescribe      blood glucose (ONETOUCH ULTRA) test strip  Historical      Continuous Blood Gluc  (DEXCOM G7 ) JOSEPHINE Use to read blood sugars as per 's instructions., Disp-1 each, R-0, E-Prescribe      Continuous Glucose Sensor (DEXCOM G7 SENSOR) MISC Change every 10 days., Disp-3 each, R-5, E-Prescribe      EPINEPHrine (ANY BX GENERIC EQUIV) 0.3 MG/0.3ML injection 2-pack Inject 0.3 mLs (0.3 mg) into the muscle as needed for anaphylaxis (related to bee stings) May repeat one time in 5-15 minutes if response to initial dose is inadequate., Disp-2 each, R-1, E-Prescribe      polyethylene glycol (GOLYTELY) 236 g suspension Two days before procedure at 5PM fill first container with water. Mix and drink an 8 oz glass every 15 minutes until HALF of the container is gone. Place the remainder in the refrigerator. One day before procedure at 5PM drink second half of bowel prep.  Drink an 8 oz glass every 15 minutes until it is gone. Day of procedure 6 hours before arrival time fill the 2nd container with water. Mix and drink an 8 oz glass every 15 minutes until HALF of the container is gone. Discard the remaining solution., Disp -8000 mL, R-0, E-PrescribePharmacy may substitute for equivalent. Not a duplicate. Needs two containers for extended bowel prep      tirzepatide (MOUNJARO) 5 MG/0.5ML pen Inject 5 mg Subcutaneous every 7 days, Disp-6 mL, R-3, E-Prescribe      DULoxetine (CYMBALTA) 30 MG capsule Take 1 capsule twice a day., Disp-180 capsule, R-3, E-Prescribe       !! - Potential duplicate medications found. Please discuss with provider.        STOP taking these medications       diltiazem ER COATED BEADS (CARDIZEM CD/CARTIA XT) 120 MG 24 hr capsule Comments:   Reason for Stopping:         ipratropium - albuterol 0.5 mg/2.5 mg/3 mL (DUONEB) 0.5-2.5 (3) MG/3ML neb solution Comments:   Reason for Stopping:         umeclidinium (INCRUSE ELLIPTA) 62.5 MCG/ACT inhaler Comments:   Reason for Stopping:             Allergies   Allergies   Allergen Reactions     Aspirin      325mg     Bee Venom Anaphylaxis    Penicillins Hives    Acetaminophen GI Disturbance    Azithromycin Dizziness    Colon Care     Interferons Dermatitis    Metformin GI Disturbance

## 2024-08-27 NOTE — LETTER
PRIMARY CARE CARE COORDINATION   [facility]  [address] [place] [address]    August 27, 2024    Jenn Aparicio  [address]   [place] [place] [facility] [address]      Ambulatory Care Coordination to TCU Hand In Communication  Name: Jenn Aparicio is a patient of BENOIT Lara CNP at [facility] clinic and I am the Care Coordinator.     CC Contact Information:   Email: [email or web address]  Phone: [phone]    Current Services in Place:  Additional details/ specific concerns related to recent admission:  Follow Up Recommendations:    Scheduled:   To be scheduled:    I would like to collaborate with the TCU Care Team during this patient's stay; please invite me to any care conferences.   Please feel free to contact me with questions or further collaboration in discharge planning.     JOHANN Benavidez Care Manager  Primary Care Clinic   Phone: [phone]  Fax: [phone]

## 2024-08-27 NOTE — TELEPHONE ENCOUNTER
Call from the [facility] TCU unit manager Amy who states that patient was just admitted and they were told to call regarding an upcoming colonoscopy that patient has scheduled on 9/5/24 to get more information.     Patient was recently discharged from hospital 8/26/24. Informed Amy that pre assessment team was watching this appointment to see if patient was getting discharged prior to calling. Colonoscopy indication is for screening. Questioned how patient was doing post discharge and per Amy they have not assessed the patient personally but was going to do that after calling endoscopy team.     Writer will consult with primary care provider/ordering provider to request review and if procedure should be postponed due to hospitalization events of new onset A Fib with RVR requiring ICU for pressors. Patient is on Apixaban (Eliquis). Amy agreeable to plan.     Writer will attempt to follow up with TCU before 8/22/24 in order to ensure proper medication holds. Amy can be reached at [phone].   ---------------------------------------------------------------------------------------    Staff message sent to primary care provider Mitzi Nettles regarding if procedure should be delayed due to recent cardiac event and started on Apixaban (Eliquis).     Per hospital encounter 8/14/24 noted:  - Continue eliquis as started on this hospitalization  - Continue losartan   - Cardiology follow-up on discharge with holter   - Monitor for need for resumption of rate control off amiodarone      Patria Howe, RN  Endoscopy Procedure Pre Assessment   [phone] option 4

## 2024-08-28 ENCOUNTER — TELEPHONE (OUTPATIENT)
Dept: GASTROENTEROLOGY | Facility: CLINIC | Age: 69
End: 2024-08-28
Payer: COMMERCIAL

## 2024-08-28 NOTE — TELEPHONE ENCOUNTER
Caller: No call needed    Reason for Reschedule/Cancellation   (please be detailed, any staff messages or encounters to note?): Needs to be delayed      Prior to reschedule please review:  Ordering Provider: Mitzi Nettles  Sedation Determined: MAC  Does patient have any ASC Exclusions, please identify?: Y - DAVID, COPD/asthma, O2 use      Notes on Cancelled Procedure:  Procedure: Lower Endoscopy [Colonoscopy]   Date: 9/5/2024  Location: [facility]; [address] [place] [address], 3rd Floor, [place] [id number]   Surgeon: Lanre      Rescheduled: No, not at this time.        Did you cancel or rescheduled an EUS procedure? No.

## 2024-08-28 NOTE — TELEPHONE ENCOUNTER
----- Message from Patria DAVIES sent at 8/28/2024  6:55 AM CDT -----  Regarding: RE: Colonoscopy 9/5/24 - cancel  Endoscopy scheduling team - Please see note below.     Colonoscopy scheduled not warranted at this time. Ok to cancel. TCU informed of status as well.       Thank you,   Patria Howe RN  Endoscopy Procedure Pre Assessment   [phone] option 4  ----- Message -----  From: Mitzi Nettles APRN CNP  Sent: 8/27/2024   6:06 PM CDT  To: Patria Howe RN  Subject: FW: Colonoscopy 9/5/24                           Will discontinue order for colonoscopy.    Mitzi LABOY CNP  ----- Message -----  From: Patria Howe RN  Sent: 8/27/2024   7:53 AM CDT  To: Sana Cummings RN; BENOIT Razo CNP  Subject: Colonoscopy 9/5/24                               Patient is scheduled for a Colonoscopy  Date of procedure: 9/5/24  Indication: screening   Sedation type: MAC    Upon review patient recently discharged from hospital on 8/26/24 and now at TCU. Patient was admitted for to [facility] on 8/14/2024 for further evaluation and treatment of acute on chronic back pain and left lower extremity pain and swelling impairing ambulation with hospital course complicated by new onset A Fib with RVR requiring ICU for pressors.     Per hospital encounter noted:  - Continue eliquis as started on this hospitalization  - Continue losartan   - Cardiology follow-up on discharge with holter   - Monitor for need for resumption of rate control off amiodarone    --------------------------------------------------------------------------------------    Gastroenterology Service Line is advising delay of procedure for at least 6 months post cardiac event. Please review and advise appropriateness of procedure and whether delay of procedure is recommended. If procedure should NOT be delayed for 6 months, please indicate date ok to proceed with completing and if ok to hold Apixaban (Eliquis) safely.       Thank you,    Patria Howe RN  Endoscopy Procedure Pre Assessment RN  [phone] option 4

## 2024-08-28 NOTE — TELEPHONE ENCOUNTER
"Response received from primary care provider Mitzi LABOY CNP:    \"Will discontinue order for colonoscopy\"    ---------------------------------------------------------------------------------------    Writer will forward to endoscopy scheduling team regarding ok to cancel colonoscopy scheduled on 9/5/24 as it is not warranted at this time.     Writer called over to TCU and spoke to Rabia as Amy was not in yet but they were able to take down a message as they are also aware of colonoscopy appointment and awaiting information. Informed Rabia that colonoscopy is not needed at this time per primary care provider so TCU team does not need to prepare or get patient to 9/5/24 colonoscopy. Per Rabia they will let Amy and the patient know of status. TCU team to call back endoscopy team if needed.         Patria Howe, RN  Endoscopy Procedure Pre Assessment   [phone] option 4       "

## 2024-09-02 ENCOUNTER — ORDERS ONLY (AUTO-RELEASED) (OUTPATIENT)
Dept: MEDSURG UNIT | Facility: CLINIC | Age: 69
End: 2024-09-02
Payer: COMMERCIAL

## 2024-09-02 DIAGNOSIS — I48.0 PAROXYSMAL ATRIAL FIBRILLATION (H): ICD-10-CM

## 2024-09-09 ENCOUNTER — MEDICAL CORRESPONDENCE (OUTPATIENT)
Dept: HEALTH INFORMATION MANAGEMENT | Facility: CLINIC | Age: 69
End: 2024-09-09

## 2024-09-16 ENCOUNTER — OFFICE VISIT (OUTPATIENT)
Dept: GASTROENTEROLOGY | Facility: CLINIC | Age: 69
End: 2024-09-16
Payer: COMMERCIAL

## 2024-09-16 VITALS
DIASTOLIC BLOOD PRESSURE: 67 MMHG | WEIGHT: 197.2 LBS | HEART RATE: 100 BPM | OXYGEN SATURATION: 98 % | SYSTOLIC BLOOD PRESSURE: 122 MMHG | HEIGHT: 66 IN | BODY MASS INDEX: 31.69 KG/M2

## 2024-09-16 DIAGNOSIS — K44.9 HIATAL HERNIA: ICD-10-CM

## 2024-09-16 DIAGNOSIS — K21.9 GASTROESOPHAGEAL REFLUX DISEASE, UNSPECIFIED WHETHER ESOPHAGITIS PRESENT: Primary | ICD-10-CM

## 2024-09-16 PROCEDURE — 99215 OFFICE O/P EST HI 40 MIN: CPT | Performed by: PHYSICIAN ASSISTANT

## 2024-09-16 ASSESSMENT — PAIN SCALES - GENERAL: PAINLEVEL: WORST PAIN (10)

## 2024-09-16 NOTE — PATIENT INSTRUCTIONS
It was a pleasure meeting with you today and discussing your healthcare plan. Below is a summary of what we covered:    - Continue Omeprazole 20 mg twice daily which is best taken on an empty stomach 30-60 minutes prior to meals  - Reflux lifestyle modifications as directed within the AVS  - Obtain a wedge pillow and sleep with head end of the best elevated   - Do not eat or drink for at least 4 hours prior to laying down for bed  - Message to providers to clarify medications/acid suppressive medication   - Formal videos swallow study. To schedule imaging, please call [phone]   - Follow up with your dentist regarding poor dietician and to consider dentures.   - If imaging is unrevealing and swallowing concerns increase would then consider upper endoscopy with repeat dilation   - Colonoscopy for colon cancer timing to be determined after evaluation with PACs clinic in light of recent admission requiring ICU cares    Gastroesophageal Reflux Disease (GERD) Lifestyle Modifications:   If taking acid suppression therapy (PPI ie Pantoprazole, Lansoprazole, Omeprazole, Esomeprazole, Rabeprazole, Dexlansoprazole) it should be taken 30 - 60 minutes prior to meals on an empty stomach to have maximum effect  Avoid triggers for reflux such as coffee, chocolate, carbonated beverages, spicy foods, acidic foods (tomato based/citrus and foods with high fat content   Abstinence from alcohol and cessation of all tobacco products is recommended   Studies have shown that weight loss, exercise and maintaining a healthy BMI significantly reduce GERD symptoms   Remain upright while eating and immediately after meals  Do not eat or drink at least 3 hours prior to laying down supine/laying down for bed   Avoid late night/middle of the night snacking    Consider obtaining a wedge pillow or elevating the head end of the bed while sleeping   Avoid sleeping right side down as this can place the lower part of the esophagus/lower esophageal  sphincter in a dependent position that favors reflux   Attempting to eat smaller more frequent meals may improve symptoms         Please call my nurse Nicole ([phone]), Gustavo ([phone]) with any questions or concerns.      See below for any additional questions and scheduling guidelines.    Sign up for Couchsurfing: C[employer]fing patient portal serves as a secure platform for accessing your medical records from the [facility]. Additionally, Couch[employer]fing facilitates easy, timely, and secure messaging with your care team. If you have not signed up, you may do so by using the provided code or calling [phone].    Coordinating your care after your visit:  There are multiple options for scheduling your follow-up care based on your provider's recommendation.    How do I schedule a follow-up clinic appointment:   After your appointment, you may receive scheduling assistance with the Clinic Coordinators by having a seat in the waiting room and a Clinic Coordinator will call you up to schedule.  Virtual visits or after you leave the clinic:  Your provider has placed a follow-up order in the [employer] portal for scheduling your return appointment. A member of the scheduling team will contact you to schedule.  [employer] Scheduling: Timely scheduling through [employer]ouch[employer] is advised to ensure appointment availability.   Call to schedule: You may schedule your follow-up appointment(s) by calling [phone], option 1.    How do I schedule my endoscopy or colonoscopy procedure:  If a procedure, such as a colonoscopy or upper endoscopy was ordered by your provider, the scheduling team will contact you to schedule this procedure. Or you may choose to call to schedule at   [phone], option 2.  Please allow 20-30 minutes when scheduling a procedure.    How do I get my blood work done? To get your blood work done, you need to schedule a lab appointment at an [facility]. There are multiple ways to schedule:   At  the clinic: The Clinic Coordinator you meet after your visit can help you schedule a lab appointment.   BOARDZ scheduling: BOARDZ offers online lab scheduling at all [facility] laboratory locations.   Call to schedule: You can call [phone] to schedule your lab appointment.    How do I schedule my imaging study: To schedule imaging studies, such as CT scans, ultrasounds, MRIs, or X-rays, contact Imaging Services at [phone].    How do I schedule a referral to another doctor: If your provider recommended a referral to another specialist(s), the referral order was placed by your provider. You will receive a phone call to schedule this referral, or you may choose to call the number attached to the referral to self-schedule.    For Post-Visit Question(s):  For any inquiries following today's visit:  Please utilize BOARDZ messaging and allow 48 hours for reply or contact the Call Center during normal business hours at [phone], option 3.  For Emergent After-hours questions, contact the On-Call GI Fellow through the [facility]  at [phone].  In addition, you may contact your Nurse directly using the provided contact information.    Test Results:  Test results will be accessible via BOARDZ in compliance with the 21st Century Cures Act. This means that your results will be available to you at the same time as your provider. Often you may see your results before your provider does. Results are reviewed by staff within two weeks with communication follow-up. Results may be released in the patient portal prior to your care team review.    Prescription Refill(s):  Medication prescribed by your provider will be addressed during your visit. For future refills, please coordinate with your pharmacy. If you have not had a recent clinic visit or routine labs, for your safety, your provider may not be able to refill your prescription.         Sincerely,    Gabriela Damon PA-C  Division of  Gastroenterology, Hepatology, and Nutrition  [facility]

## 2024-09-16 NOTE — NURSING NOTE
"Chief Complaint   Patient presents with    Follow Up       Vitals:    09/16/24 0808 09/16/24 0844   BP: 122/67    Pulse: 112 100   SpO2: (!) 88% 98%   Weight: 89.4 kg (197 lb 3.2 oz)    Height: 1.676 m (5' 6\")        Body mass index is 31.83 kg/m .    Yamil Mcgraw MA   "

## 2024-09-16 NOTE — NURSING NOTE
"Chief Complaint   Patient presents with    Follow Up       Vitals:    09/16/24 0808   BP: 122/67   Pulse: 112   SpO2: (!) 88%   Weight: 89.4 kg (197 lb 3.2 oz)   Height: 1.676 m (5' 6\")       Body mass index is 31.83 kg/m .    Yamil Mcgraw MA   "

## 2024-09-16 NOTE — LETTER
9/16/2024      Jenn Aparicio  [address]  [place] [address]      Dear Colleague,    Thank you for referring your patient, Jenn Aparicio, to the [facility] [place]. Please see a copy of my visit note below.    Gastroenterology Visit for: Jenn Aparicio 1955   MRN: [id number]     Reason for Visit:  chief complaint    Referred by: Self  / No address on file  Patient Care Team:  Mitzi Nettles APRN CNP as PCP - General (Nurse Practitioner)  Zarina Leung MD as MD (Internal Medicine-Hematology & Oncology)  Latesha Christianson, JOHANN as Clinic Care Coordinator  Zarina Leung MD as Assigned Cancer Care Provider  Mitzi Nettles APRN CNP as Assigned PCP  Lynda Spann MD as Fellow (Pulmonary Disease)  La Nena Shabazz MD as MD (Endocrinology, Diabetes, and Metabolism)  Nithya Narvaez RN as Diabetes Educator (Diabetes Education)  Nithya Narvaez RN as Diabetes Educator (Diabetes Education)  La Nena Shabazz MD as Assigned Endocrinology Provider  Kush Santamaria MD as MD (Internal Medicine)  Kailee Moralez MD as Resident (Student in organized health care education/training program)  Gab Walden MD as Hospitalist (Internal Medicine)  Re Keita MD as MD (Ophthalmology)  Samy Prasad [facility] as Assigned [place] Pharmacist  Earl Ureña MD as Assigned Pulmonology Provider  Re Keita MD as Assigned Surgical Provider  Ana Davey, RN as Specialty Care Coordinator (Hematology & Oncology)  Gabriela Damon PA-C as Assigned Gastroenterology Provider  Reema Cho, RN as Lead Care Coordinator (Primary Care - CC)    History of Present Illness:   Jenn Aparicio is a 69 year old female with significant past medical history pertinent for serve COPD on home O2 (2L @ rest/3L with exertion), RLL adenocarcinoma of the lung s/p lobectomy (2/2016), RUL NSCLC s/p SBRT (1/2020) acute on chronic hypoxic respiratory failure, DM type II complicated by  "diabetic neuropathy, who is presenting as a follow up patient with a chief complaint of reflux and dysphagia.      -----------------------------------------------------------------------------------------------------------------------------------------------------------------------------------------------------------------------------------  Interval History September 16, 2024:    Today Jenn reports feeling short of breath with activity and moving around the home ie going to the bathroom. Although, she reports this has been stable since her two admissions throughout this summer. Associated with intermittent palpitations.  She denies acute onset headache, vision changes, presyncopal symptoms including lightheadedness/dizziness as well as feeling faint.     Now with increase pill dysphagia since being discharged from the nursing home.  Noting this is occurring intermittently and not daily.   Denies dysphagia to all other consistencies.      Currently Jenn was taking Omeprazole 20 mg twice daily in July and once daily in August which she is obtaining from [facility]. With the once daily dosing was experiencing heartburn daily without relief with the use of TUMs.     -----------------------------------------------------------------------------------------------------------------------------------------------------------------------------------------------------------------------------------  Interval History April 29, 2024:      It is unknown which acid suppressive medication Jenn is taking. She believes that she may be taking Omeprazole 20 mg possibly twice daily and with this regimen symptoms of reflux are well controlled. If eating late at night will experience rare breakthrough symptoms. Notes she has to eat earlier in the day.      Notes that she has poor dietician. Total of \"6 little pegs in the front.\"  No change in dysphagia symptoms since dilation 8/8/2023. Symptoms are occurring to solids occurring 1-2 times " per month. Will cut pills in 1/2 if they are large. Denies dysphagia to semi solids and liquids.      Started Hira last week.      Denies unintentional weight loss, nausea, emesis, odynophagia, diarrhea, constipation, nocturnal stooling, incontinence, melena, hematochezia and BRBR.   -----------------------------------------------------------------------------------------------------------------------------------------------------------------------------------------------------------------------------------  Interval History December 18, 2023:    Seen by oncology 12/13/2023 with concerns for reoccurrence of disease with plan for repeat PET/possible targeted biopsies.      Since the increase in Ozempic has had increase in regurgitation and dysgeusia despite use of Omeprazole 40 mg BID.  Prior to escalation reflux/regurgitation was well controlled.      Additionally she has had an increase in gas/distention and generalized abdominal cramping also with the increase of the Ozempic. The symptoms have no association with defecation/oral intake.      Associated with gait imbalance, feeling of instability, leg cramping and fatigue.      Denies weight loss, nausea, emesis, dysphagia, odynophagia, bloating/fullness, post prandial bloating, diarrhea, constipation, nocturnal stooling, incontinence, melena, hematochezia and BRBR.     -----------------------------------------------------------------------------------------------------------------------------------------------------------------------------------------------------------------------------------    Interval History September 18, 2023:    CBC CMP 5/2023 WNL.     Since the endoscopy 8/8/2023 she has had no problems with dysphagia.     Currently she is experiencing heartburn/regurgitation daily after each meal. Currently she is taking Omeprazole 20mg twice daily 30 - 60 minutes prior to meals.  Noting lemon, chocolate are a dietary trigger for reflux.     Symptoms of  reflux have worsened since initiation of Ozempic.     Denies weight loss, nausea, emesis, dysphagia, odynophagia, abdominal pain, diarrhea, constipation (< 3 stools per week), nocturnal stooling, incontinence of feces, melena, hematochezia and BRBR.     Brother possibly had pancreatic cancer? Patient is not certain to the details    No additional family history or GI related malignancy (esophageal, gastric, pancreatic, liver or colon).     ------------------------------------------------------------------------------------------------------------------------------------------------------------------------------------------------------------------------------------------    7/2023 Dr. BRITTANY RECIO    History of Present Illness:   This is a patient evidence following for the last few years with severe acid reflux associate with emphysema also dysphagia which has been difficult to pin down she had initially an esophagram which are demonstrated some tertiary contractions and EGD follow-up of this which did not demonstrate any abnormalities with exception of a hiatal hernia.  The patient went on to have an esophageal manometry recently which is read as normal.  The patient's continue to have difficulty swallowing sometimes regurgitating food back up is not sure from where when I talk about her chest or areas that she feels sensations it is difficult for her to answer that.  She sometimes feels like she has a sore throat and cannot swallow.    Esophageal Questionnaire(s)    BEDQ Questionnaire      6/28/2023     2:12 PM 12/18/2023     7:59 AM 4/29/2024     8:19 AM 9/16/2024     7:49 AM   BEDQ Questionnaire: How Often Have You Had the Following?   Trouble eating solid food (meat, bread, vegetables) 4 4 2 2   Trouble eating soft foods (yogurt, jello, pudding) 0   0   Trouble swallowing liquids 4   0   Pain while swallowing 0   0   Coughing or choking while swallowing foods or liquids 2   0   Total Score: 10   2          6/28/2023     2:12 PM 4/29/2024     8:19 AM 9/16/2024     7:49 AM   BEDQ Questionnaire: Discomfort/Pain Ratings   Eating solid food (meat, bread, vegetables) 0 3 1   Eating soft foods (yogurt, jello, pudding) 0  0   Drinking liquid 0  0   Total Score: 0  1       Eckardt Questionnaire      6/28/2023     2:12 PM 12/18/2023     7:59 AM 9/16/2024     7:49 AM   Eckardt Questionnaire   Dysphagia 2 2 0   Regurgitation 3 2 0   Retrosternal Pain 0 0 0   Weight Loss (kg) 0 0 1   Total Score:  5 4 1       Promis 10 Questionnaire      12/18/2023     8:01 AM 4/29/2024     8:27 AM 9/16/2024     7:51 AM   PROMIS 10 FLOWSHEET DATA   In general, would you say your health is: 2 2 2   In general, would you say your quality of life is: 5 3 2   In general, how would you rate your physical health? 1 2 1   In general, how would you rate your mental health, including your mood and your ability to think? 5 4 4   In general, how would you rate your satisfaction with your social activities and relationships? 3 3 1   In general, please rate how well you carry out your usual social activities and roles. (This includes activities at home, at work and in your community, and responsibilities as a parent, child, spouse, employee, friend, etc.) 3 2 1   To what extent are you able to carry out your everyday physical activities such as walking, climbing stairs, carrying groceries, or moving a chair? 2 2 1   In the past 7 days, how often have you been bothered by emotional problems such as feeling anxious, depressed, or irritable? 2 2 4   In the past 7 days, how would you rate your fatigue on average? 3 4 3   In the past 7 days, how would you rate your pain on average, where 0 means no pain, and 10 means worst imaginable pain? 9 9 10   Mental health question re-calculation - no clinical value 4 4 2   Physical health question re-calculation - no clinical value 3 2 3   Pain question re-calculation - no clinical value 2 2 1   Global Mental Health Score 17  14 9   Global Physical Health Score 8 8 6   PROMIS TOTAL - SUBSCORES 25 22 15       STUDIES & PROCEDURES:    EGD:     8/8/2023 Dr. Rodrigues  Findings:       There was pooling of thin secretions in the posterior        oropharynx/hypopharynx and in the vallecula. This was suctioned by the        anesthesia team repeatedly throughout the procedure        The examined esophagus was normal.        No endoscopic abnormality was evident in the esophagus to explain the        patient's complaint of dysphagia. It was decided, however, to proceed        with dilation at the cricopharyngeus. A TTS dilator was passed through        the scope. Dilation with 11-12-13.5-15-16mm dilator was performed. The        dilation site was examined and showed no change. Estimated blood loss:        none.        The Z-line was regular and was found 38 cm from the incisors.        A 2 cm hiatal hernia was found. The proximal extent of the gastric folds        (end of tubular esophagus) was 38 cm from the incisors. The hiatal        narrowing was 40 cm from the incisors.        The gastroesophageal flap valve was visualized endoscopically and        classified as Hill Grade III (minimal fold, loose to endoscope, hiatal        hernia likely).        Patchy moderately erythematous mucosa without bleeding was found in the        gastric body and in the gastric antrum. Biopsies were taken with a cold        forceps for Helicobacter pylori testing. Verification of patient        identification for the specimen was done. Estimated blood loss: none.        The duodenal bulb, first portion of the duodenum, second portion of the        duodenum and examined duodenum were normal.                                                                                    Impression:   - Normal esophagus.                          - No endoscopic esophageal abnormality to explain                          patient's dysphagia. Esophagus dilated. Dilated.                           - Z-line regular, 38 cm from the incisors.                          - 2 cm hiatal hernia.                          - Gastroesophageal flap valve classified as Hill Grade                          III (minimal fold, loose to endoscope, hiatal hernia                          likely).                          - Erythematous mucosa in the gastric body and antrum.                          Biopsied.                          - Normal duodenal bulb, first portion of the duodenum,                          second portion of the duodenum and examined duodenum.     Final Diagnosis   A. STOMACH, BIOPSY:  - Gastric mucosa with mild chronic inactive gastritis  - Negative for H. pylori-like organisms on routine staining  - Negative for intestinal metaplasia or dysplasia        8/16/2022   Findings:       The examined esophagus was normal. Normal secondary peristalsis        observered, without any tertiary contractions.        Esophagogastric landmarks were identified: the Z-line was found at 37        cm, the gastroesophageal junction was found at 37 cm and the site of        hiatal narrowing was found at 37 cm from the incisors.        A 3 cm hiatal hernia was present.        No other significant abnormalities were identified in a careful        examination of the stomach.        The cardia and gastric fundus were normal on retroflexion.        The in the duodenum was normal.                                                                                    Impression:               - Normal esophagus.                             - Esophagogastric landmarks identified.                             - 3 cm hiatal hernia. Otherwise normal stomach.                             - Normal duodenum.                             - No specimens collected.     Colonoscopy:    [facility] [place]    Findings:   Three sessile polyps were found in the sigmoid colon, descending colon   and appendiceal orifice. The polyps were 3 to 4 mm in  "size. These polyps   were removed with a cold snare. Resection and retrieval were complete.   Four sessile polyps were found in the transverse colon and cecum. The   polyps were 1 to 2 mm in size. These polyps were removed with a cold   biopsy forceps. Resection and retrieval were complete.   Multiple small and large-mouthed diverticula were found in the sigmoid   colon, descending colon, splenic flexure, hepatic flexure, ascending   colon and cecum.     Impression: - Three 3 to 4 mm polyps in the sigmoid colon, in the   descending colon and at the appendiceal orifice, removed   with a cold snare. Resected and retrieved.   - Four 1 to 2 mm polyps in the transverse colon and in the   cecum, removed with a cold biopsy forceps. Resected and   retrieved.   - Diverticulosis in the sigmoid colon, in the descending   colon, at the splenic flexure, at the hepatic flexure, in   the ascending colon and in the cecum.   Recommendation:    - Discharge patient to home.   - Return to referring physician.   - Repeat colonoscopy in 3 years for surveillance.      Final Diagnosis:   A.  Colon, cecum and appendiceal, polyp, biopsy -   Tubular adenoma, multiple fragments.   B.  Colon, transverse, polyp, biopsy -  Tubular adenoma.   C.  Colon, descending and sigmoid, polyp, biopsy -    Tubular adenoma, multiple fragments.  Sessile serrated adenoma.     EndoFLIP directed at the UES or LES (8cm (EF-325) balloon length or 16cm (EF-322) balloon length):   Date:  8cm balloon  Balloon inflation Balloon pressure CSA (mm^2) DI (mm^2/mmHg) Dmin (mm) Compliance   20 (ladmark ID)        30        40        50           16cm balloon  Balloon inflation Balloon pressure CSA (mm^2) DI (mm^2/mmHg) Dmin (mm) Compliance   30 (ladmark ID)        40        50        60        70           High Resolution Manometry:    6/28/2023   Impression    Interpretation / Findings  \"The baseline tone of the lower esophageal sphincter was normotensive. The lower " "esophageal sphincter was able to relax appropriately as measured by the IRP: supine median 12.3 mm Hg, upright median 10.4 mm Hg. The EGJ morphology was type II. There was peristalsis visible. The DCI, DL, and contractile pattern were/were not within normal limits. There were 10/10 swallows with elevated intrabolus pressure and compartmentalized pressurization. Rapid water swallows were performed with normal augmentation. Rapid Drink Challenge does not indicate an elevated IRP. Supine liquid swallows were performed. Upright liquid swallows were performed. Bolus transit as measured by impedance was complete in 10/10 swallows. This is most consistent with absence of major disorder of esophageal peristalsis. Upper esophageal sphincter pressure somewhat elevated but clinical significant not clear.      Wording of procedure description and interpretation was adapted from [facility] [employer] Weekly Motility Conference (2018).\"       Impressions  There was no evidence of major disorder of esophageal peristalsis per [place] 4.0 classification. Consider work up for alternate causes of dysphagia including speech path study with modified barium swallow if not done.       PH/Impedance:     [place]:    CT:    5/25/2023 CT AP W Contrast   IMPRESSION:  1.  Unchanged right upper lobe nodular consolidation. However, there is new nodular/masslike area of consolidation anteriorly and laterally. While this may reflect focal pneumonitis, neoplastic progression would be difficult to exclude. Consider further   evaluation with PET/CT or close imaging surveillance with repeat CT in 3 months.  2.  Unchanged scarlike right apical nodule, which is of doubtful clinical significance.  3.  Moderate emphysema.  4.  No new lymphadenopathy.    Esophagram:    5/20/2022 Esophagram   FINDINGS:   The esophagus demonstrates tertiary contractions suggestive of  dysmotility. There is no evidence of intrinsic or extrinsic mass " or  polyp. No constricting or obstructing lesion is identified. There is  no mucosal ulceration. Mucosal folds are normal in appearance. Small  hiatal hernia. Spontaneous gastroesophageal reflux is visualized at  the level of the clavicle in the RPO position with head elevated  approximately 30 degrees.                                                                      IMPRESSION: Small hiatal hernia with spontaneous gastroesophageal  reflux up to the level of the clavicle is visualized. Tertiary  contractions suggestive of esophageal dysmotility.      FL VSS:     GES:    U/S:     XRAY:    Other:       Prior medical records were reviewed including, but not limited to, notes from referring providers, lab work, radiographic tests, and other diagnostic tests. Pertinent results were summarized above.     History     Past Medical History:   Diagnosis Date     Adenocarcinoma, lung (H)      Asthma      Chronic left-sided low back pain with left-sided sciatica 08/16/2024     Chronic respiratory failure with hypoxia (H) 08/16/2024     Ectopic pregnancy      Esophageal reflux      Pulmonary emphysema (H)     Very severe FEV1<30% predicted     Type II diabetes mellitus (H)        Past Surgical History:   Procedure Laterality Date     2/8/16                R thoracotomy, RLL lobectomy (Dr. Cunha). Adenocarcinoma, 1.1 cm, assoicated with atypical adenomatous hyperplasia Right 02/08/2016    R thoracotomy, RLL lobectomy (Dr. Cunha). Adenocarcinoma, 1.1 cm, assoicated with atypical adenomatous hyperplasia     BRONCHOSCOPY, WITH BIOPSY, ROBOT ASSISTED N/A 7/19/2023    Procedure: robot assisted Ion BRONCHOSCOPY, WITH BIOPSY;  Surgeon: Patria Gracia MD;  Location: [facility] OR     ENDOBRONCHIAL ULTRASOUND FLEXIBLE N/A 7/19/2023    Procedure: Endobronchial ultrasound flexible;  Surgeon: Patria Garcia MD;  Location: [facility] OR     ESOPHAGOSCOPY, GASTROSCOPY, DUODENOSCOPY (EGD), COMBINED N/A 8/16/2022    Procedure:  ESOPHAGOGASTRODUODENOSCOPY (EGD);  Surgeon: Travis Briseno MD;  Location:  GI     ESOPHAGOSCOPY, GASTROSCOPY, DUODENOSCOPY (EGD), COMBINED N/A 2023    Procedure: ESOPHAGOGASTRODUODENOSCOPY, WITH BIOPSY;  Surgeon: Chao Rodrigues DO;  Location:  GI     ORTHOPEDIC SURGERY       PHACOEMULSIFICATION CLEAR CORNEA WITH STANDARD INTRAOCULAR LENS IMPLANT Left 2023    Procedure: LEFT EYE PHACOEMULSIFICATION, CATARACT, WITH INTRAOCULAR LENS IMPLANT;  Surgeon: Re Keita MD;  Location: [facility] OR     PHACOEMULSIFICATION CLEAR CORNEA WITH STANDARD INTRAOCULAR LENS IMPLANT Right 2023    Procedure: RIGHT EYE PHACOEMULSIFICATION, CATARACT, WITH INTRAOCULAR LENS IMPLANT;  Surgeon: Re Keita MD;  Location: [facility] OR       Social History     Socioeconomic History     Marital status: Single     Spouse name: Not on file     Number of children: Not on file     Years of education: Not on file     Highest education level: Not on file   Occupational History     Not on file   Tobacco Use     Smoking status: Former     Current packs/day: 0.00     Types: Cigarettes     Quit date:      Years since quittin.7     Passive exposure: Past     Smokeless tobacco: Never     Tobacco comments:     2023 Patient using 14 mg patch, wants to have prescription for Nicotrol inhaler, took workbook   Vaping Use     Vaping status: Never Used   Substance and Sexual Activity     Alcohol use: Not Currently     Drug use: No     Sexual activity: Not Currently     Partners: Male   Other Topics Concern     Not on file   Social History Narrative    Ms. Aparicio lives in an apartment complex by herself as of May 2022. She has frequent smoke exposure from neighbors even when she is not smoking herself.      Social Determinants of Health     Financial Resource Strain: Low Risk  (2024)    Financial Resource Strain      Within the past 12 months, have you or your family members you live with been unable to get utilities (heat,  electricity) when it was really needed?: No   Food Insecurity: Low Risk  (8/21/2024)    Food Insecurity      Within the past 12 months, did you worry that your food would run out before you got money to buy more?: No      Within the past 12 months, did the food you bought just not last and you didn t have money to get more?: No   Transportation Needs: Low Risk  (8/21/2024)    Transportation Needs      Within the past 12 months, has lack of transportation kept you from medical appointments, getting your medicines, non-medical meetings or appointments, work, or from getting things that you need?: No   Physical Activity: Not on file   Stress: Not on file   Social Connections: Not on file   Interpersonal Safety: Low Risk  (8/22/2024)    Interpersonal Safety      Do you feel physically and emotionally safe where you currently live?: Yes      Within the past 12 months, have you been hit, slapped, kicked or otherwise physically hurt by someone?: No      Within the past 12 months, have you been humiliated or emotionally abused in other ways by your partner or ex-partner?: No   Housing Stability: Low Risk  (8/21/2024)    Housing Stability      Do you have housing? : Yes      Are you worried about losing your housing?: No       Family History   Problem Relation Age of Onset     Thyroid Disease Mother      Cerebrovascular Disease Mother      Hypertension Mother      Hypertension Father      Glaucoma Father      Cancer Sister      Lung Cancer Sister      Diabetes Brother      Cancer Brother      Diabetes Brother      Cancer Brother      Diabetes Brother      Deep Vein Thrombosis (DVT) Daughter      Depression Daughter      Alcohol/Drug Other         self     Diabetes Other         self     Thyroid Disease Other         self     Asthma Other         self     Macular Degeneration No family hx of      Anesthesia Reaction No family hx of      Family history reviewed and edited as appropriate    Medications and Allergies:      Outpatient Encounter Medications as of 9/16/2024   Medication Sig Dispense Refill     acetaminophen (TYLENOL) 325 MG tablet Take 3 tablets (975 mg) by mouth 3 times daily.       acetylcysteine (MUCOMYST) 10 % nebulizer solution Inhale 4 mLs into the lungs 4 times daily as needed for mucolysis/respiratory distress 30 mL 5     albuterol (PROAIR HFA/PROVENTIL HFA/VENTOLIN HFA) 108 (90 Base) MCG/ACT inhaler USE 1 OR 2 INHALATIONS EVERY 4 HOURS AS NEEDED 17 g 11     albuterol (PROVENTIL) (2.5 MG/3ML) 0.083% neb solution Take 1 vial (2.5 mg) by nebulization 4 times daily 360 mL 5     alpha-lipoic acid 600 MG capsule Take 1 capsule (600 mg) by mouth daily 90 capsule 3     apixaban ANTICOAGULANT (ELIQUIS) 5 MG tablet Take 1 tablet (5 mg) by mouth 2 times daily       aspirin 81 MG EC tablet Take 1 tablet (81 mg) by mouth every morning 90 tablet 3     atorvastatin (LIPITOR) 10 MG tablet Take 1 tablet (10 mg) by mouth daily 90 tablet 3     bisacodyl (DULCOLAX) 5 MG EC tablet Two days prior to exam take two (2) tablets at 4pm. One day prior to exam take two (2) tablets at 4pm 4 tablet 0     blood glucose (ONETOUCH ULTRA) test strip        carboxymethylcellulose (REFRESH LIQUIGEL) 1 % ophthalmic solution Place 1 drop into both eyes 4 times daily 15 mL 11     cetirizine (ZYRTEC) 10 MG tablet Take 1 tablet (10 mg) by mouth every morning 90 tablet 3     Continuous Blood Gluc  (DEXCOM G7 ) JOSEPHINE Use to read blood sugars as per 's instructions. 1 each 0     Continuous Glucose Sensor (DEXCOM G7 SENSOR) MISC Change every 10 days. 3 each 5     cyanocobalamin (VITAMIN B-12) 500 MCG tablet Take 1 tablet (500 mcg) by mouth daily 90 tablet 3     cyclobenzaprine (FLEXERIL) 5 MG tablet Take 1 tablet (5 mg) by mouth 3 times daily.       cyclobenzaprine (FLEXERIL) 5 MG tablet Take 1 tablet (5 mg) by mouth 3 times daily as needed for muscle spasms 30 tablet 1     DULoxetine (CYMBALTA) 60 MG capsule Take 1 capsule  (60 mg) by mouth 2 times daily.       empagliflozin (JARDIANCE) 25 MG TABS tablet Take 1 tablet (25 mg) by mouth every morning 90 tablet 3     EPINEPHrine (ANY BX GENERIC EQUIV) 0.3 MG/0.3ML injection 2-pack Inject 0.3 mLs (0.3 mg) into the muscle as needed for anaphylaxis (related to bee stings) May repeat one time in 5-15 minutes if response to initial dose is inadequate. 2 each 1     fluticasone (FLONASE) 50 MCG/ACT nasal spray Spray 1 spray into both nostrils 2 times daily Use at night before bed 15.8 mL 3     Fluticasone-Umeclidin-Vilanterol (TRELEGY ELLIPTA) 200-62.5-25 MCG/ACT oral inhaler USE 1 INHALATION DAILY 28 each 5     GLUCOSAMINE-CHONDROITIN -400 MG tablet TAKE 1 TABLET DAILY (Patient taking differently: Take 1 tablet by mouth every evening.) 90 tablet 3     levalbuterol (XOPENEX) 1.25 MG/3ML neb solution Take 3 mLs (1.25 mg) by nebulization every 4 hours as needed for wheezing.       losartan (COZAAR) 50 MG tablet Take 1 tablet (50 mg) by mouth daily 90 tablet 3     menthol (ICY HOT) 5 % PTCH Apply 2 patches over 8 hours topically every 8 hours as needed for muscle soreness.       Nutritional Supplements (GLUCERNA SHAKE) LIQD Take 1 Can by mouth 3 times daily (with meals).       omega-3 acid ethyl esters (LOVAZA) 1 g capsule Take 2 capsules (2 g) by mouth 2 times daily 360 capsule 3     omeprazole (PRILOSEC) 20 MG DR capsule Take 1 capsule (20 mg) by mouth 2 times daily. 90 capsule 1     omeprazole (PRILOSEC) 20 MG DR capsule Take 1 capsule (20 mg) by mouth daily (Patient taking differently: Take 20 mg by mouth 2 times daily.) 90 capsule 3     oxyCODONE (ROXICODONE) 5 MG tablet Take 0.5-1 tablets (2.5-5 mg) by mouth every 4 hours as needed for moderate to severe pain (IF pain not managed with non-pharmacological and non-opioid interventions) 30 tablet 0     polyethylene glycol (GOLYTELY) 236 g suspension Two days before procedure at 5PM fill first container with water. Mix and drink an 8 oz  "glass every 15 minutes until HALF of the container is gone. Place the remainder in the refrigerator. One day before procedure at 5PM drink second half of bowel prep. Drink an 8 oz glass every 15 minutes until it is gone. Day of procedure 6 hours before arrival time fill the 2nd container with water. Mix and drink an 8 oz glass every 15 minutes until HALF of the container is gone. Discard the remaining solution. 8000 mL 0     polyethylene glycol (MIRALAX) 17 GM/Dose powder Take 17 g by mouth daily       polyethylene glycol-propylene glycol (SYSTANE) 0.4-0.3 % SOLN ophthalmic solution Place 1 drop into both eyes 4 times daily 5 mL 11     pregabalin (LYRICA) 150 MG capsule Take 1 capsule (150 mg) by mouth 2 times daily TAKE 1 CAPSULE(150 MG) BY MOUTH TWICE DAILY 180 capsule 3     roflumilast (DALIRESP) 500 MCG TABS tablet Take 1 tablet (500 mcg) by mouth every morning 90 tablet 3     tirzepatide (MOUNJARO) 5 MG/0.5ML pen Inject 5 mg Subcutaneous every 7 days 6 mL 3     pregabalin (LYRICA) 200 MG capsule Take 1 capsule (200 mg) by mouth 2 times daily for 30 days 60 capsule 0     No facility-administered encounter medications on file as of 9/16/2024.        Allergies   Allergen Reactions     Aspirin      325mg      Bee Venom Anaphylaxis     Penicillins Hives     Acetaminophen GI Disturbance     Azithromycin Dizziness     Colon Care      Interferons Dermatitis     Metformin GI Disturbance        Review of systems:  A full 10 point review of systems was obtained and was negative except for the pertinent positives and negatives stated within the HPI.    Objective Findings:   Physical Exam:    Constitutional: /67   Pulse 100   Ht 1.676 m (5' 6\")   Wt 89.4 kg (197 lb 3.2 oz)   SpO2 98%   BMI 31.83 kg/m    General: Alert, cooperative, no distress, well-appearing. Uses walker.   Head: Atraumatic, normocephalic, no obvious abnormalities   Eyes: Sclera anicteric, no obvious conjunctival hemorrhage   Nose: Nares normal, " no obvious malformation, no obvious rhinorrhea   Respiratory: Resting comfortably, no apparent distress, no cough.   Gastrointestinal: Soft, non distended  Skin: No jaundice, no obvious rash  Neurologic: AAOx3, no obvious neurologic abnormality  Psychiatric: Normal Affect, appropriate mood  Extremities: No obvious edema, no obvious malformation     Labs, Radiology, Pathology     Lab Results   Component Value Date    WBC 8.1 08/26/2024    WBC 7.8 08/25/2024    WBC 8.1 08/24/2024    HGB 11.6 (L) 08/26/2024    HGB 11.7 08/25/2024    HGB 11.5 (L) 08/24/2024     08/26/2024     08/25/2024     08/24/2024    CHOL 166 11/27/2023    CHOL 220 (H) 10/26/2023    CHOL 251 (H) 01/27/2022    TRIG 64 11/27/2023    TRIG 96 10/26/2023    TRIG 127 01/27/2022    HDL 76 11/27/2023    HDL 55 10/26/2023    HDL 58 01/27/2022    ALT 6 08/16/2024    ALT 12 05/15/2024    ALT 9 01/18/2024    AST 21 08/16/2024    AST 19 05/15/2024    AST 17 01/18/2024     08/26/2024     08/25/2024     08/24/2024    BUN 9.6 08/26/2024    BUN 8.1 08/25/2024    BUN 10.3 08/24/2024    CO2 33 (H) 08/26/2024    CO2 34 (H) 08/25/2024    CO2 33 (H) 08/24/2024    TSH 3.46 08/17/2024    TSH 3.79 10/26/2023    TSH 2.65 08/31/2022    INR 1.15 08/15/2024    INR 1.01 07/22/2024    INR 1.02 02/03/2023        Liver Function Studies -   Recent Labs   Lab Test 05/25/23  0909   PROTTOTAL 7.0   ALBUMIN 4.0   BILITOTAL 0.3   ALKPHOS 89   AST 14   ALT 9*          Patient Active Problem List    Diagnosis Date Noted     Chronic respiratory failure with hypoxia (H) 08/16/2024     Priority: Medium     Chronic left-sided low back pain with left-sided sciatica 08/16/2024     Priority: Medium     Failure to thrive in adult 08/14/2024     Priority: Medium     Acute left ankle pain 08/14/2024     Priority: Medium     Class 2 severe obesity due to excess calories with serious comorbidity in adult (H) 10/27/2023     Priority: Medium     Combined forms of  age-related cataract of both eyes 05/31/2023     Priority: Medium     Added automatically from request for surgery [id number]       Gastroesophageal reflux disease without esophagitis 05/24/2022     Priority: Medium     Esophageal dysphagia 05/24/2022     Priority: Medium     Shortness of breath 05/21/2022     Priority: Medium     Chest pain, unspecified type 05/21/2022     Priority: Medium     Hypokalemia 05/21/2022     Priority: Medium     Hypoxia 05/20/2022     Priority: Medium     Diabetic neuropathy (H) 11/18/2021     Priority: Medium     Malnutrition (H24) 06/25/2021     Priority: Medium     Chronic obstructive pulmonary disease, unspecified COPD type (H) 02/23/2021     Priority: Medium     Primary lung adenocarcinoma (H) STAGE: IA2 [id number] poorly diff adeno RLL, then [id number] IA2 suspected NSCLC RUL, medically inoperable  12/15/2020     Priority: Medium     Pulmonary emphysema (H) 09/22/2020     Priority: Medium     Type 2 diabetes mellitus without complication, without long-term current use of insulin (H) 01/09/2019     Priority: Medium     Cervical stenosis (uterine cervix) 03/18/2011     Priority: Medium     Vaginitis 03/14/2011     Priority: Medium     Postmenopausal bleeding 03/14/2011     Priority: Medium      Assessment and Plan   Assessment/Plan:    Jenn Aparicio is a 69 year old female with significant past medical history pertinent for serve COPD on home O2 (2L @ rest/3L with exertion), RLL adenocarcinoma of the lung s/p lobectomy (2/2016), RUL NSCLC s/p SBRT (1/2020) acute on chronic hypoxic respiratory failure, DM type II complicated by diabetic neuropathy, who is presenting as a follow up patient with a chief complaint of reflux and dysphagia.    Patient was recently admitted 8/14/2024 to 8/26/2024 for A-fib with RVR requiring ICU cares and pressors    #Chronic Dysphagia - Resolved  #GERD    #Hiatal Hernia 2-3 cm     Prior Evaluation Includes:    5/20/2022 esophagram with small hiatal hernia with  spontaneous reflux to the level of the clavicle and tertiary contractions     6/28/2023 normal high-resolution manometry study    8/8/2023 EGD with dilation of the cricopharyngeus to 16 mm with a TTS balloon.  Findings also notable for 2 cm hiatal hernia.  There was mild patchy erythematous mucosa within the gastric body/antrum.  Biopsies with mild chronic inactive gastritis. Negative for H. pylori and intestinal metaplasia.    At her last meeting 4/2024 Jenn reported improvement in her dysphagia symptoms post dilation.  In the past 1 to 2 weeks she has been experiencing increased pill dysphagia otherwise she denies dysphagia to lower consistencies. Again concern is for oropharyngeal etiology as patient does have poor dentition. Additional pertinent findings of pooling within the posterior oropharynx/hypopharynx and in the vallecula on upper endoscopy. With these concerns/findings formal video swallow study was previously recommended although not completed. Patient was given imaging scheduling number and asked to call to schedule the additional evaluation today. In regards to Jenn's reflux symptoms this best controlled with Omeprazole 20 mg twice daily. Again today emphasis was placed on weight loss and the impacts this can have on both the regression of a small hiatal hernia and benefits towards reflux symptoms.       - Continue Omeprazole 20 mg twice daily which is best taken on an empty stomach 30-60 minutes prior to meals  - Reflux lifestyle modifications as directed within the AVS  - Obtain a wedge pillow and sleep with head end of the best elevated   - Do not eat or drink for at least 4 hours prior to laying down for bed  - Message to providers to clarify medications/acid suppressive medication   - Formal videos swallow study. To schedule imaging, please call [phone]   - Follow up with your dentist regarding poor dietician and to consider dentures.   - If imaging is unrevealing and swallowing concerns  increase would then consider upper endoscopy with repeat dilation     #Colorectal Cancer Screening   Colonoscopy 2018 with >3 adenomatous polyps. Recall 3 years.  No family history of CRC. Per the most recent update by the [place] Multi-Society Task Force on Colorectal Cancer recall should be 3 years, 2021 or sooner if otherwise indicated.  Patient is currently overdue for colonoscopy.    - Colonoscopy for colon cancer timing to be determined after evaluation with PACs clinic in light of recent admission requiring ICU cares. Patient understands that she is currently overdue for colon cancer screening however in light of her recent cardiopulmonary events that the conversation of screening will be revisited in 3 to 6 months time.    #Abnormal Vitals   Upon arrival Jenn initially had vitals of  /67, , SP%O2 88% which was after ambulation clinic entrance on first floor.  Repeat vitals were obtained following the office visit with  and SP %O2 of 98%.  Patient was asymptomatic today other than her chronic RUSSELL. Jenn simply admitted to the hospital with hypotension found to have A-fib with RVR and requiring pressor support.  She was seen by cardiology while admitted  and was subsequently initiated on Eliquis and discharged with a Holter monitor with recommendations to monitor for need of resumption of rate control off amiodarone.    Patient was given strict ER precautions and asked to follow-up with her primary/cardiopulmonary teams today. She was advised that is she were to develop chest pain, acute onset headache, vision changes, presyncopal symptoms including lightheadedness/dizziness as well as feeling faint or RUSSELL which is increased from her baseline but she should call 911.  She is agreeable with this plan and understands when to seek additional evaluation    Follow up plan:   Return to clinic 4-6 months and as needed.    The risks and benefits of my recommendations, as well as other treatment options  were discussed with the patient and any available family today. All questions were answered.     Follow up: As planned above. Today, I personally spent 32 minutes in direct face to face time with the patient, of which greater than 50% of the time was spent in patient education and counseling as described above. Approximately 12 minutes were spent on indirect care associated with the patient's consultation including but not limited to review of: patient medical records to date, clinic visits, hospital records, lab results, imaging studies, procedural documentation, and coordinating care with other providers. The findings from this review are summarized in the above note. All of the above accounted for a cumulative time of 44 minutes and was performed on the date of service.     The patient verbalized understanding of the plan and was appreciative for the time spent and information provided during the office visit.           Gabriela Damon PA-C  Division of Gastroenterology, Hepatology, and Nutrition  [facility]       Documentation assisted by voice recognition and documentation system.          Again, thank you for allowing me to participate in the care of your patient.        Sincerely,        Gabriela Damon PA-C

## 2024-09-16 NOTE — PROGRESS NOTES
Gastroenterology Visit for: Jenn Aparicio 1955   MRN: [id number]     Reason for Visit:  chief complaint    Referred by: Self  / No address on file  Patient Care Team:  Mitzi Nettles APRN CNP as PCP - General (Nurse Practitioner)  Zarina Leung MD as MD (Internal Medicine-Hematology & Oncology)  Latesha Christianson, RN as Clinic Care Coordinator  Zarina Leung MD as Assigned Cancer Care Provider  Mitzi Nettles APRN CNP as Assigned PCP  Lynda Spann MD as Fellow (Pulmonary Disease)  La Nena Shabazz MD as MD (Endocrinology, Diabetes, and Metabolism)  Nithya Narvaez RN as Diabetes Educator (Diabetes Education)  Nithya Narvaez RN as Diabetes Educator (Diabetes Education)  La Nena Shabazz MD as Assigned Endocrinology Provider  Kush Santamaria MD as MD (Internal Medicine)  Kailee Moralez MD as Resident (Student in organized health care education/training program)  Gab Walden MD as Hospitalist (Internal Medicine)  Re Keita MD as MD (Ophthalmology)  Samy Prasad [facility] as Assigned MTM Pharmacist  Earl Ureña MD as Assigned Pulmonology Provider  Re Keita MD as Assigned Surgical Provider  Ana Davey, RN as Specialty Care Coordinator (Hematology & Oncology)  Gabriela Damon PA-C as Assigned Gastroenterology Provider  Reema Cho, RN as Lead Care Coordinator (Primary Care - CC)    History of Present Illness:   Jenn Aparicio is a 69 year old female with significant past medical history pertinent for serve COPD on home O2 (2L @ rest/3L with exertion), RLL adenocarcinoma of the lung s/p lobectomy (2/2016), RUL NSCLC s/p SBRT (1/2020) acute on chronic hypoxic respiratory failure, DM type II complicated by diabetic neuropathy, who is presenting as a follow up patient with a chief complaint of reflux and dysphagia.   "    -----------------------------------------------------------------------------------------------------------------------------------------------------------------------------------------------------------------------------------  Interval History September 16, 2024:    Today Jenn reports feeling short of breath with activity and moving around the home ie going to the bathroom. Although, she reports this has been stable since her two admissions throughout this summer. Associated with intermittent palpitations.  She denies acute onset headache, vision changes, presyncopal symptoms including lightheadedness/dizziness as well as feeling faint.     Now with increase pill dysphagia since being discharged from the nursing home.  Noting this is occurring intermittently and not daily.   Denies dysphagia to all other consistencies.      Currently Jenn was taking Omeprazole 20 mg twice daily in July and once daily in August which she is obtaining from [facility]. With the once daily dosing was experiencing heartburn daily without relief with the use of TUMs.     -----------------------------------------------------------------------------------------------------------------------------------------------------------------------------------------------------------------------------------  Interval History April 29, 2024:      It is unknown which acid suppressive medication Jenn is taking. She believes that she may be taking Omeprazole 20 mg possibly twice daily and with this regimen symptoms of reflux are well controlled. If eating late at night will experience rare breakthrough symptoms. Notes she has to eat earlier in the day.      Notes that she has poor dietician. Total of \"6 little pegs in the front.\"  No change in dysphagia symptoms since dilation 8/8/2023. Symptoms are occurring to solids occurring 1-2 times per month. Will cut pills in 1/2 if they are large. Denies dysphagia to semi solids and liquids.      Started " Hira last week.      Denies unintentional weight loss, nausea, emesis, odynophagia, diarrhea, constipation, nocturnal stooling, incontinence, melena, hematochezia and BRBR.   -----------------------------------------------------------------------------------------------------------------------------------------------------------------------------------------------------------------------------------  Interval History December 18, 2023:    Seen by oncology 12/13/2023 with concerns for reoccurrence of disease with plan for repeat PET/possible targeted biopsies.      Since the increase in Ozempic has had increase in regurgitation and dysgeusia despite use of Omeprazole 40 mg BID.  Prior to escalation reflux/regurgitation was well controlled.      Additionally she has had an increase in gas/distention and generalized abdominal cramping also with the increase of the Ozempic. The symptoms have no association with defecation/oral intake.      Associated with gait imbalance, feeling of instability, leg cramping and fatigue.      Denies weight loss, nausea, emesis, dysphagia, odynophagia, bloating/fullness, post prandial bloating, diarrhea, constipation, nocturnal stooling, incontinence, melena, hematochezia and BRBR.     -----------------------------------------------------------------------------------------------------------------------------------------------------------------------------------------------------------------------------------    Interval History September 18, 2023:    CBC CMP 5/2023 WNL.     Since the endoscopy 8/8/2023 she has had no problems with dysphagia.     Currently she is experiencing heartburn/regurgitation daily after each meal. Currently she is taking Omeprazole 20mg twice daily 30 - 60 minutes prior to meals.  Noting lemon, chocolate are a dietary trigger for reflux.     Symptoms of reflux have worsened since initiation of Ozempic.     Denies weight loss, nausea, emesis, dysphagia,  odynophagia, abdominal pain, diarrhea, constipation (< 3 stools per week), nocturnal stooling, incontinence of feces, melena, hematochezia and BRBR.     Brother possibly had pancreatic cancer? Patient is not certain to the details    No additional family history or GI related malignancy (esophageal, gastric, pancreatic, liver or colon).     ------------------------------------------------------------------------------------------------------------------------------------------------------------------------------------------------------------------------------------------    7/2023 Dr. BRITTANY RECIO    History of Present Illness:   This is a patient evidence following for the last few years with severe acid reflux associate with emphysema also dysphagia which has been difficult to pin down she had initially an esophagram which are demonstrated some tertiary contractions and EGD follow-up of this which did not demonstrate any abnormalities with exception of a hiatal hernia.  The patient went on to have an esophageal manometry recently which is read as normal.  The patient's continue to have difficulty swallowing sometimes regurgitating food back up is not sure from where when I talk about her chest or areas that she feels sensations it is difficult for her to answer that.  She sometimes feels like she has a sore throat and cannot swallow.    Esophageal Questionnaire(s)    BEDQ Questionnaire      6/28/2023     2:12 PM 12/18/2023     7:59 AM 4/29/2024     8:19 AM 9/16/2024     7:49 AM   BEDQ Questionnaire: How Often Have You Had the Following?   Trouble eating solid food (meat, bread, vegetables) 4 4 2 2   Trouble eating soft foods (yogurt, jello, pudding) 0   0   Trouble swallowing liquids 4   0   Pain while swallowing 0   0   Coughing or choking while swallowing foods or liquids 2   0   Total Score: 10   2         6/28/2023     2:12 PM 4/29/2024     8:19 AM 9/16/2024     7:49 AM   BEDQ Questionnaire: Discomfort/Pain  Ratings   Eating solid food (meat, bread, vegetables) 0 3 1   Eating soft foods (yogurt, jello, pudding) 0  0   Drinking liquid 0  0   Total Score: 0  1       Eckardt Questionnaire      6/28/2023     2:12 PM 12/18/2023     7:59 AM 9/16/2024     7:49 AM   Eckardt Questionnaire   Dysphagia 2 2 0   Regurgitation 3 2 0   Retrosternal Pain 0 0 0   Weight Loss (kg) 0 0 1   Total Score:  5 4 1       Promis 10 Questionnaire      12/18/2023     8:01 AM 4/29/2024     8:27 AM 9/16/2024     7:51 AM   PROMIS 10 FLOWSHEET DATA   In general, would you say your health is: 2 2 2   In general, would you say your quality of life is: 5 3 2   In general, how would you rate your physical health? 1 2 1   In general, how would you rate your mental health, including your mood and your ability to think? 5 4 4   In general, how would you rate your satisfaction with your social activities and relationships? 3 3 1   In general, please rate how well you carry out your usual social activities and roles. (This includes activities at home, at work and in your community, and responsibilities as a parent, child, spouse, employee, friend, etc.) 3 2 1   To what extent are you able to carry out your everyday physical activities such as walking, climbing stairs, carrying groceries, or moving a chair? 2 2 1   In the past 7 days, how often have you been bothered by emotional problems such as feeling anxious, depressed, or irritable? 2 2 4   In the past 7 days, how would you rate your fatigue on average? 3 4 3   In the past 7 days, how would you rate your pain on average, where 0 means no pain, and 10 means worst imaginable pain? 9 9 10   Mental health question re-calculation - no clinical value 4 4 2   Physical health question re-calculation - no clinical value 3 2 3   Pain question re-calculation - no clinical value 2 2 1   Global Mental Health Score 17 14 9   Global Physical Health Score 8 8 6   PROMIS TOTAL - SUBSCORES 25 22 15       STUDIES &  PROCEDURES:    EGD:     8/8/2023 Dr. Rodrigues  Findings:       There was pooling of thin secretions in the posterior        oropharynx/hypopharynx and in the vallecula. This was suctioned by the        anesthesia team repeatedly throughout the procedure        The examined esophagus was normal.        No endoscopic abnormality was evident in the esophagus to explain the        patient's complaint of dysphagia. It was decided, however, to proceed        with dilation at the cricopharyngeus. A TTS dilator was passed through        the scope. Dilation with 11-12-13.5-15-16mm dilator was performed. The        dilation site was examined and showed no change. Estimated blood loss:        none.        The Z-line was regular and was found 38 cm from the incisors.        A 2 cm hiatal hernia was found. The proximal extent of the gastric folds        (end of tubular esophagus) was 38 cm from the incisors. The hiatal        narrowing was 40 cm from the incisors.        The gastroesophageal flap valve was visualized endoscopically and        classified as Hill Grade III (minimal fold, loose to endoscope, hiatal        hernia likely).        Patchy moderately erythematous mucosa without bleeding was found in the        gastric body and in the gastric antrum. Biopsies were taken with a cold        forceps for Helicobacter pylori testing. Verification of patient        identification for the specimen was done. Estimated blood loss: none.        The duodenal bulb, first portion of the duodenum, second portion of the        duodenum and examined duodenum were normal.                                                                                    Impression:   - Normal esophagus.                          - No endoscopic esophageal abnormality to explain                          patient's dysphagia. Esophagus dilated. Dilated.                          - Z-line regular, 38 cm from the incisors.                          - 2 cm hiatal  hernia.                          - Gastroesophageal flap valve classified as Hill Grade                          III (minimal fold, loose to endoscope, hiatal hernia                          likely).                          - Erythematous mucosa in the gastric body and antrum.                          Biopsied.                          - Normal duodenal bulb, first portion of the duodenum,                          second portion of the duodenum and examined duodenum.     Final Diagnosis   A. STOMACH, BIOPSY:  - Gastric mucosa with mild chronic inactive gastritis  - Negative for H. pylori-like organisms on routine staining  - Negative for intestinal metaplasia or dysplasia        8/16/2022   Findings:       The examined esophagus was normal. Normal secondary peristalsis        observered, without any tertiary contractions.        Esophagogastric landmarks were identified: the Z-line was found at 37        cm, the gastroesophageal junction was found at 37 cm and the site of        hiatal narrowing was found at 37 cm from the incisors.        A 3 cm hiatal hernia was present.        No other significant abnormalities were identified in a careful        examination of the stomach.        The cardia and gastric fundus were normal on retroflexion.        The in the duodenum was normal.                                                                                    Impression:               - Normal esophagus.                             - Esophagogastric landmarks identified.                             - 3 cm hiatal hernia. Otherwise normal stomach.                             - Normal duodenum.                             - No specimens collected.     Colonoscopy:    [facility]    Findings:   Three sessile polyps were found in the sigmoid colon, descending colon   and appendiceal orifice. The polyps were 3 to 4 mm in size. These polyps   were removed with a cold snare. Resection and retrieval were complete.  "  Four sessile polyps were found in the transverse colon and cecum. The   polyps were 1 to 2 mm in size. These polyps were removed with a cold   biopsy forceps. Resection and retrieval were complete.   Multiple small and large-mouthed diverticula were found in the sigmoid   colon, descending colon, splenic flexure, hepatic flexure, ascending   colon and cecum.     Impression: - Three 3 to 4 mm polyps in the sigmoid colon, in the   descending colon and at the appendiceal orifice, removed   with a cold snare. Resected and retrieved.   - Four 1 to 2 mm polyps in the transverse colon and in the   cecum, removed with a cold biopsy forceps. Resected and   retrieved.   - Diverticulosis in the sigmoid colon, in the descending   colon, at the splenic flexure, at the hepatic flexure, in   the ascending colon and in the cecum.   Recommendation:    - Discharge patient to home.   - Return to referring physician.   - Repeat colonoscopy in 3 years for surveillance.      Final Diagnosis:   A.  Colon, cecum and appendiceal, polyp, biopsy -   Tubular adenoma, multiple fragments.   B.  Colon, transverse, polyp, biopsy -  Tubular adenoma.   C.  Colon, descending and sigmoid, polyp, biopsy -    Tubular adenoma, multiple fragments.  Sessile serrated adenoma.     EndoFLIP directed at the UES or LES (8cm (EF-325) balloon length or 16cm (EF-322) balloon length):   Date:  8cm balloon  Balloon inflation Balloon pressure CSA (mm^2) DI (mm^2/mmHg) Dmin (mm) Compliance   20 (ladmark ID)        30        40        50           16cm balloon  Balloon inflation Balloon pressure CSA (mm^2) DI (mm^2/mmHg) Dmin (mm) Compliance   30 (ladmark ID)        40        50        60        70           High Resolution Manometry:    6/28/2023   Impression    Interpretation / Findings  \"The baseline tone of the lower esophageal sphincter was normotensive. The lower esophageal sphincter was able to relax appropriately as measured by the IRP: supine median 12.3 mm " "Hg, upright median 10.4 mm Hg. The EGJ morphology was type II. There was peristalsis visible. The DCI, DL, and contractile pattern were/were not within normal limits. There were 10/10 swallows with elevated intrabolus pressure and compartmentalized pressurization. Rapid water swallows were performed with normal augmentation. Rapid Drink Challenge does not indicate an elevated IRP. Supine liquid swallows were performed. Upright liquid swallows were performed. Bolus transit as measured by impedance was complete in 10/10 swallows. This is most consistent with absence of major disorder of esophageal peristalsis. Upper esophageal sphincter pressure somewhat elevated but clinical significant not clear.      Wording of procedure description and interpretation was adapted from [facility] Weekly Motility Conference (2018).\"       Impressions  There was no evidence of major disorder of esophageal peristalsis per [place] 4.0 classification. Consider work up for alternate causes of dysphagia including speech path study with modified barium swallow if not done.       PH/Impedance:     [place]:    CT:    5/25/2023 CT AP W Contrast   IMPRESSION:  1.  Unchanged right upper lobe nodular consolidation. However, there is new nodular/masslike area of consolidation anteriorly and laterally. While this may reflect focal pneumonitis, neoplastic progression would be difficult to exclude. Consider further   evaluation with PET/CT or close imaging surveillance with repeat CT in 3 months.  2.  Unchanged scarlike right apical nodule, which is of doubtful clinical significance.  3.  Moderate emphysema.  4.  No new lymphadenopathy.    Esophagram:    5/20/2022 Esophagram   FINDINGS:   The esophagus demonstrates tertiary contractions suggestive of  dysmotility. There is no evidence of intrinsic or extrinsic mass or  polyp. No constricting or obstructing lesion is identified. There is  no mucosal ulceration. Mucosal " folds are normal in appearance. Small  hiatal hernia. Spontaneous gastroesophageal reflux is visualized at  the level of the clavicle in the RPO position with head elevated  approximately 30 degrees.                                                                      IMPRESSION: Small hiatal hernia with spontaneous gastroesophageal  reflux up to the level of the clavicle is visualized. Tertiary  contractions suggestive of esophageal dysmotility.      FL VSS:     GES:    U/S:     XRAY:    Other:       Prior medical records were reviewed including, but not limited to, notes from referring providers, lab work, radiographic tests, and other diagnostic tests. Pertinent results were summarized above.     History     Past Medical History:   Diagnosis Date    Adenocarcinoma, lung (H)     Asthma     Chronic left-sided low back pain with left-sided sciatica 08/16/2024    Chronic respiratory failure with hypoxia (H) 08/16/2024    Ectopic pregnancy     Esophageal reflux     Pulmonary emphysema (H)     Very severe FEV1<30% predicted    Type II diabetes mellitus (H)        Past Surgical History:   Procedure Laterality Date    2/8/16                R thoracotomy, RLL lobectomy (Dr. Cunha). Adenocarcinoma, 1.1 cm, assoicated with atypical adenomatous hyperplasia Right 02/08/2016    R thoracotomy, RLL lobectomy (Dr. Cunha). Adenocarcinoma, 1.1 cm, assoicated with atypical adenomatous hyperplasia    BRONCHOSCOPY, WITH BIOPSY, ROBOT ASSISTED N/A 7/19/2023    Procedure: robot assisted Ion BRONCHOSCOPY, WITH BIOPSY;  Surgeon: Patria Garcia MD;  Location: [facility] OR    ENDOBRONCHIAL ULTRASOUND FLEXIBLE N/A 7/19/2023    Procedure: Endobronchial ultrasound flexible;  Surgeon: Patria Garcia MD;  Location: [facility] OR    ESOPHAGOSCOPY, GASTROSCOPY, DUODENOSCOPY (EGD), COMBINED N/A 8/16/2022    Procedure: ESOPHAGOGASTRODUODENOSCOPY (EGD);  Surgeon: Travis Briseno MD;  Location:  GI    ESOPHAGOSCOPY, GASTROSCOPY, DUODENOSCOPY (EGD), COMBINED  N/A 2023    Procedure: ESOPHAGOGASTRODUODENOSCOPY, WITH BIOPSY;  Surgeon: Chao Rodrigues DO;  Location:  GI    ORTHOPEDIC SURGERY      PHACOEMULSIFICATION CLEAR CORNEA WITH STANDARD INTRAOCULAR LENS IMPLANT Left 2023    Procedure: LEFT EYE PHACOEMULSIFICATION, CATARACT, WITH INTRAOCULAR LENS IMPLANT;  Surgeon: Re Keita MD;  Location: [facility] OR    PHACOEMULSIFICATION CLEAR CORNEA WITH STANDARD INTRAOCULAR LENS IMPLANT Right 2023    Procedure: RIGHT EYE PHACOEMULSIFICATION, CATARACT, WITH INTRAOCULAR LENS IMPLANT;  Surgeon: Re Keita MD;  Location: [facility] OR       Social History     Socioeconomic History    Marital status: Single     Spouse name: Not on file    Number of children: Not on file    Years of education: Not on file    Highest education level: Not on file   Occupational History    Not on file   Tobacco Use    Smoking status: Former     Current packs/day: 0.00     Types: Cigarettes     Quit date:      Years since quittin.7     Passive exposure: Past    Smokeless tobacco: Never    Tobacco comments:     2023 Patient using 14 mg patch, wants to have prescription for Nicotrol inhaler, took workbook   Vaping Use    Vaping status: Never Used   Substance and Sexual Activity    Alcohol use: Not Currently    Drug use: No    Sexual activity: Not Currently     Partners: Male   Other Topics Concern    Not on file   Social History Narrative    Ms. Aparicio lives in an apartment complex by herself as of May 2022. She has frequent smoke exposure from neighbors even when she is not smoking herself.      Social Determinants of Health     Financial Resource Strain: Low Risk  (2024)    Financial Resource Strain     Within the past 12 months, have you or your family members you live with been unable to get utilities (heat, electricity) when it was really needed?: No   Food Insecurity: Low Risk  (2024)    Food Insecurity     Within the past 12 months, did you worry that your food  would run out before you got money to buy more?: No     Within the past 12 months, did the food you bought just not last and you didn t have money to get more?: No   Transportation Needs: Low Risk  (8/21/2024)    Transportation Needs     Within the past 12 months, has lack of transportation kept you from medical appointments, getting your medicines, non-medical meetings or appointments, work, or from getting things that you need?: No   Physical Activity: Not on file   Stress: Not on file   Social Connections: Not on file   Interpersonal Safety: Low Risk  (8/22/2024)    Interpersonal Safety     Do you feel physically and emotionally safe where you currently live?: Yes     Within the past 12 months, have you been hit, slapped, kicked or otherwise physically hurt by someone?: No     Within the past 12 months, have you been humiliated or emotionally abused in other ways by your partner or ex-partner?: No   Housing Stability: Low Risk  (8/21/2024)    Housing Stability     Do you have housing? : Yes     Are you worried about losing your housing?: No       Family History   Problem Relation Age of Onset    Thyroid Disease Mother     Cerebrovascular Disease Mother     Hypertension Mother     Hypertension Father     Glaucoma Father     Cancer Sister     Lung Cancer Sister     Diabetes Brother     Cancer Brother     Diabetes Brother     Cancer Brother     Diabetes Brother     Deep Vein Thrombosis (DVT) Daughter     Depression Daughter     Alcohol/Drug Other         self    Diabetes Other         self    Thyroid Disease Other         self    Asthma Other         self    Macular Degeneration No family hx of     Anesthesia Reaction No family hx of      Family history reviewed and edited as appropriate    Medications and Allergies:     Outpatient Encounter Medications as of 9/16/2024   Medication Sig Dispense Refill    acetaminophen (TYLENOL) 325 MG tablet Take 3 tablets (975 mg) by mouth 3 times daily.      acetylcysteine  (MUCOMYST) 10 % nebulizer solution Inhale 4 mLs into the lungs 4 times daily as needed for mucolysis/respiratory distress 30 mL 5    albuterol (PROAIR HFA/PROVENTIL HFA/VENTOLIN HFA) 108 (90 Base) MCG/ACT inhaler USE 1 OR 2 INHALATIONS EVERY 4 HOURS AS NEEDED 17 g 11    albuterol (PROVENTIL) (2.5 MG/3ML) 0.083% neb solution Take 1 vial (2.5 mg) by nebulization 4 times daily 360 mL 5    alpha-lipoic acid 600 MG capsule Take 1 capsule (600 mg) by mouth daily 90 capsule 3    apixaban ANTICOAGULANT (ELIQUIS) 5 MG tablet Take 1 tablet (5 mg) by mouth 2 times daily      aspirin 81 MG EC tablet Take 1 tablet (81 mg) by mouth every morning 90 tablet 3    atorvastatin (LIPITOR) 10 MG tablet Take 1 tablet (10 mg) by mouth daily 90 tablet 3    bisacodyl (DULCOLAX) 5 MG EC tablet Two days prior to exam take two (2) tablets at 4pm. One day prior to exam take two (2) tablets at 4pm 4 tablet 0    blood glucose (ONETOUCH ULTRA) test strip       carboxymethylcellulose (REFRESH LIQUIGEL) 1 % ophthalmic solution Place 1 drop into both eyes 4 times daily 15 mL 11    cetirizine (ZYRTEC) 10 MG tablet Take 1 tablet (10 mg) by mouth every morning 90 tablet 3    Continuous Blood Gluc  (DEXCOM G7 ) JOSEPHINE Use to read blood sugars as per 's instructions. 1 each 0    Continuous Glucose Sensor (DEXCOM G7 SENSOR) MISC Change every 10 days. 3 each 5    cyanocobalamin (VITAMIN B-12) 500 MCG tablet Take 1 tablet (500 mcg) by mouth daily 90 tablet 3    cyclobenzaprine (FLEXERIL) 5 MG tablet Take 1 tablet (5 mg) by mouth 3 times daily.      cyclobenzaprine (FLEXERIL) 5 MG tablet Take 1 tablet (5 mg) by mouth 3 times daily as needed for muscle spasms 30 tablet 1    DULoxetine (CYMBALTA) 60 MG capsule Take 1 capsule (60 mg) by mouth 2 times daily.      empagliflozin (JARDIANCE) 25 MG TABS tablet Take 1 tablet (25 mg) by mouth every morning 90 tablet 3    EPINEPHrine (ANY BX GENERIC EQUIV) 0.3 MG/0.3ML injection 2-pack Inject  0.3 mLs (0.3 mg) into the muscle as needed for anaphylaxis (related to bee stings) May repeat one time in 5-15 minutes if response to initial dose is inadequate. 2 each 1    fluticasone (FLONASE) 50 MCG/ACT nasal spray Spray 1 spray into both nostrils 2 times daily Use at night before bed 15.8 mL 3    Fluticasone-Umeclidin-Vilanterol (TRELEGY ELLIPTA) 200-62.5-25 MCG/ACT oral inhaler USE 1 INHALATION DAILY 28 each 5    GLUCOSAMINE-CHONDROITIN -400 MG tablet TAKE 1 TABLET DAILY (Patient taking differently: Take 1 tablet by mouth every evening.) 90 tablet 3    levalbuterol (XOPENEX) 1.25 MG/3ML neb solution Take 3 mLs (1.25 mg) by nebulization every 4 hours as needed for wheezing.      losartan (COZAAR) 50 MG tablet Take 1 tablet (50 mg) by mouth daily 90 tablet 3    menthol (ICY HOT) 5 % PTCH Apply 2 patches over 8 hours topically every 8 hours as needed for muscle soreness.      Nutritional Supplements (GLUCERNA SHAKE) LIQD Take 1 Can by mouth 3 times daily (with meals).      omega-3 acid ethyl esters (LOVAZA) 1 g capsule Take 2 capsules (2 g) by mouth 2 times daily 360 capsule 3    omeprazole (PRILOSEC) 20 MG DR capsule Take 1 capsule (20 mg) by mouth 2 times daily. 90 capsule 1    omeprazole (PRILOSEC) 20 MG DR capsule Take 1 capsule (20 mg) by mouth daily (Patient taking differently: Take 20 mg by mouth 2 times daily.) 90 capsule 3    oxyCODONE (ROXICODONE) 5 MG tablet Take 0.5-1 tablets (2.5-5 mg) by mouth every 4 hours as needed for moderate to severe pain (IF pain not managed with non-pharmacological and non-opioid interventions) 30 tablet 0    polyethylene glycol (GOLYTELY) 236 g suspension Two days before procedure at 5PM fill first container with water. Mix and drink an 8 oz glass every 15 minutes until HALF of the container is gone. Place the remainder in the refrigerator. One day before procedure at 5PM drink second half of bowel prep. Drink an 8 oz glass every 15 minutes until it is gone. Day of  "procedure 6 hours before arrival time fill the 2nd container with water. Mix and drink an 8 oz glass every 15 minutes until HALF of the container is gone. Discard the remaining solution. 8000 mL 0    polyethylene glycol (MIRALAX) 17 GM/Dose powder Take 17 g by mouth daily      polyethylene glycol-propylene glycol (SYSTANE) 0.4-0.3 % SOLN ophthalmic solution Place 1 drop into both eyes 4 times daily 5 mL 11    pregabalin (LYRICA) 150 MG capsule Take 1 capsule (150 mg) by mouth 2 times daily TAKE 1 CAPSULE(150 MG) BY MOUTH TWICE DAILY 180 capsule 3    roflumilast (DALIRESP) 500 MCG TABS tablet Take 1 tablet (500 mcg) by mouth every morning 90 tablet 3    tirzepatide (MOUNJARO) 5 MG/0.5ML pen Inject 5 mg Subcutaneous every 7 days 6 mL 3    pregabalin (LYRICA) 200 MG capsule Take 1 capsule (200 mg) by mouth 2 times daily for 30 days 60 capsule 0     No facility-administered encounter medications on file as of 9/16/2024.        Allergies   Allergen Reactions    Aspirin      325mg     Bee Venom Anaphylaxis    Penicillins Hives    Acetaminophen GI Disturbance    Azithromycin Dizziness    Colon Care     Interferons Dermatitis    Metformin GI Disturbance        Review of systems:  A full 10 point review of systems was obtained and was negative except for the pertinent positives and negatives stated within the HPI.    Objective Findings:   Physical Exam:    Constitutional: /67   Pulse 100   Ht 1.676 m (5' 6\")   Wt 89.4 kg (197 lb 3.2 oz)   SpO2 98%   BMI 31.83 kg/m    General: Alert, cooperative, no distress, well-appearing. Uses walker.   Head: Atraumatic, normocephalic, no obvious abnormalities   Eyes: Sclera anicteric, no obvious conjunctival hemorrhage   Nose: Nares normal, no obvious malformation, no obvious rhinorrhea   Respiratory: Resting comfortably, no apparent distress, no cough.   Gastrointestinal: Soft, non distended  Skin: No jaundice, no obvious rash  Neurologic: AAOx3, no obvious neurologic " abnormality  Psychiatric: Normal Affect, appropriate mood  Extremities: No obvious edema, no obvious malformation     Labs, Radiology, Pathology     Lab Results   Component Value Date    WBC 8.1 08/26/2024    WBC 7.8 08/25/2024    WBC 8.1 08/24/2024    HGB 11.6 (L) 08/26/2024    HGB 11.7 08/25/2024    HGB 11.5 (L) 08/24/2024     08/26/2024     08/25/2024     08/24/2024    CHOL 166 11/27/2023    CHOL 220 (H) 10/26/2023    CHOL 251 (H) 01/27/2022    TRIG 64 11/27/2023    TRIG 96 10/26/2023    TRIG 127 01/27/2022    HDL 76 11/27/2023    HDL 55 10/26/2023    HDL 58 01/27/2022    ALT 6 08/16/2024    ALT 12 05/15/2024    ALT 9 01/18/2024    AST 21 08/16/2024    AST 19 05/15/2024    AST 17 01/18/2024     08/26/2024     08/25/2024     08/24/2024    BUN 9.6 08/26/2024    BUN 8.1 08/25/2024    BUN 10.3 08/24/2024    CO2 33 (H) 08/26/2024    CO2 34 (H) 08/25/2024    CO2 33 (H) 08/24/2024    TSH 3.46 08/17/2024    TSH 3.79 10/26/2023    TSH 2.65 08/31/2022    INR 1.15 08/15/2024    INR 1.01 07/22/2024    INR 1.02 02/03/2023        Liver Function Studies -   Recent Labs   Lab Test 05/25/23  0909   PROTTOTAL 7.0   ALBUMIN 4.0   BILITOTAL 0.3   ALKPHOS 89   AST 14   ALT 9*          Patient Active Problem List    Diagnosis Date Noted    Chronic respiratory failure with hypoxia (H) 08/16/2024     Priority: Medium    Chronic left-sided low back pain with left-sided sciatica 08/16/2024     Priority: Medium    Failure to thrive in adult 08/14/2024     Priority: Medium    Acute left ankle pain 08/14/2024     Priority: Medium    Class 2 severe obesity due to excess calories with serious comorbidity in adult (H) 10/27/2023     Priority: Medium    Combined forms of age-related cataract of both eyes 05/31/2023     Priority: Medium     Added automatically from request for surgery [id number]      Gastroesophageal reflux disease without esophagitis 05/24/2022     Priority: Medium    Esophageal dysphagia  05/24/2022     Priority: Medium    Shortness of breath 05/21/2022     Priority: Medium    Chest pain, unspecified type 05/21/2022     Priority: Medium    Hypokalemia 05/21/2022     Priority: Medium    Hypoxia 05/20/2022     Priority: Medium    Diabetic neuropathy (H) 11/18/2021     Priority: Medium    Malnutrition (H24) 06/25/2021     Priority: Medium    Chronic obstructive pulmonary disease, unspecified COPD type (H) 02/23/2021     Priority: Medium    Primary lung adenocarcinoma (H) STAGE: IA2 [id number] poorly diff adeno RLL, then [id number] IA2 suspected NSCLC RUL, medically inoperable  12/15/2020     Priority: Medium    Pulmonary emphysema (H) 09/22/2020     Priority: Medium    Type 2 diabetes mellitus without complication, without long-term current use of insulin (H) 01/09/2019     Priority: Medium    Cervical stenosis (uterine cervix) 03/18/2011     Priority: Medium    Vaginitis 03/14/2011     Priority: Medium    Postmenopausal bleeding 03/14/2011     Priority: Medium      Assessment and Plan   Assessment/Plan:    Jenn Aparicio is a 69 year old female with significant past medical history pertinent for serve COPD on home O2 (2L @ rest/3L with exertion), RLL adenocarcinoma of the lung s/p lobectomy (2/2016), RUL NSCLC s/p SBRT (1/2020) acute on chronic hypoxic respiratory failure, DM type II complicated by diabetic neuropathy, who is presenting as a follow up patient with a chief complaint of reflux and dysphagia.    Patient was recently admitted 8/14/2024 to 8/26/2024 for A-fib with RVR requiring ICU cares and pressors    #Chronic Dysphagia - Resolved  #GERD    #Hiatal Hernia 2-3 cm     Prior Evaluation Includes:    5/20/2022 esophagram with small hiatal hernia with spontaneous reflux to the level of the clavicle and tertiary contractions     6/28/2023 normal high-resolution manometry study    8/8/2023 EGD with dilation of the cricopharyngeus to 16 mm with a TTS balloon.  Findings also notable for 2 cm hiatal  hernia.  There was mild patchy erythematous mucosa within the gastric body/antrum.  Biopsies with mild chronic inactive gastritis. Negative for H. pylori and intestinal metaplasia.    At her last meeting 4/2024 Jenn reported improvement in her dysphagia symptoms post dilation.  In the past 1 to 2 weeks she has been experiencing increased pill dysphagia otherwise she denies dysphagia to lower consistencies. Again concern is for oropharyngeal etiology as patient does have poor dentition. Additional pertinent findings of pooling within the posterior oropharynx/hypopharynx and in the vallecula on upper endoscopy. With these concerns/findings formal video swallow study was previously recommended although not completed. Patient was given imaging scheduling number and asked to call to schedule the additional evaluation today. In regards to Jenn's reflux symptoms this best controlled with Omeprazole 20 mg twice daily. Again today emphasis was placed on weight loss and the impacts this can have on both the regression of a small hiatal hernia and benefits towards reflux symptoms.       - Continue Omeprazole 20 mg twice daily which is best taken on an empty stomach 30-60 minutes prior to meals  - Reflux lifestyle modifications as directed within the AVS  - Obtain a wedge pillow and sleep with head end of the best elevated   - Do not eat or drink for at least 4 hours prior to laying down for bed  - Message to providers to clarify medications/acid suppressive medication   - Formal videos swallow study. To schedule imaging, please call [phone]   - Follow up with your dentist regarding poor dietician and to consider dentures.   - If imaging is unrevealing and swallowing concerns increase would then consider upper endoscopy with repeat dilation     #Colorectal Cancer Screening   Colonoscopy 2018 with >3 adenomatous polyps. Recall 3 years.  No family history of CRC. Per the most recent update by the [place] Multi-Society Task  Force on Colorectal Cancer recall should be 3 years, 2021 or sooner if otherwise indicated.  Patient is currently overdue for colonoscopy.    - Colonoscopy for colon cancer timing to be determined after evaluation with PACs clinic in light of recent admission requiring ICU cares. Patient understands that she is currently overdue for colon cancer screening however in light of her recent cardiopulmonary events that the conversation of screening will be revisited in 3 to 6 months time.    #Abnormal Vitals   Upon arrival Jenn initially had vitals of  /67, , SP%O2 88% which was after ambulation clinic entrance on first floor.  Repeat vitals were obtained following the office visit with  and SP %O2 of 98%.  Patient was asymptomatic today other than her chronic RUSSELL. Jenn simply admitted to the hospital with hypotension found to have A-fib with RVR and requiring pressor support.  She was seen by cardiology while admitted  and was subsequently initiated on Eliquis and discharged with a Holter monitor with recommendations to monitor for need of resumption of rate control off amiodarone.    Patient was given strict ER precautions and asked to follow-up with her primary/cardiopulmonary teams today. She was advised that is she were to develop chest pain, acute onset headache, vision changes, presyncopal symptoms including lightheadedness/dizziness as well as feeling faint or RUSSELL which is increased from her baseline but she should call 911.  She is agreeable with this plan and understands when to seek additional evaluation    Follow up plan:   Return to clinic 4-6 months and as needed.    The risks and benefits of my recommendations, as well as other treatment options were discussed with the patient and any available family today. All questions were answered.     Follow up: As planned above. Today, I personally spent 32 minutes in direct face to face time with the patient, of which greater than 50% of the time  was spent in patient education and counseling as described above. Approximately 12 minutes were spent on indirect care associated with the patient's consultation including but not limited to review of: patient medical records to date, clinic visits, hospital records, lab results, imaging studies, procedural documentation, and coordinating care with other providers. The findings from this review are summarized in the above note. All of the above accounted for a cumulative time of 44 minutes and was performed on the date of service.     The patient verbalized understanding of the plan and was appreciative for the time spent and information provided during the office visit.           Gabriela Damon PA-C  Division of Gastroenterology, Hepatology, and Nutrition  [facility]       Documentation assisted by voice recognition and documentation system.

## 2024-09-19 ENCOUNTER — TELEPHONE (OUTPATIENT)
Dept: INTERNAL MEDICINE | Facility: CLINIC | Age: 69
End: 2024-09-19
Payer: COMMERCIAL

## 2024-09-19 ENCOUNTER — DOCUMENTATION ONLY (OUTPATIENT)
Dept: INTERNAL MEDICINE | Facility: CLINIC | Age: 69
End: 2024-09-19
Payer: COMMERCIAL

## 2024-09-19 NOTE — TELEPHONE ENCOUNTER
[facility] Call Center    Phone Message    May a detailed message be left on voicemail: yes     Reason for Call: Order(s): Home Care Orders: Other: requesting order for PCA home service , start of care 9/19/24, 41/2 hours everyday, continuous. Contact ROMAIN Schuster at [phone] Thank you     Action Taken: Message routed to:  [facility] ([facility]):      Travel Screening: Not Applicable     Date of Service:

## 2024-09-19 NOTE — PROGRESS NOTES
Type of Form Received: [facility] 41964, 91223    Form Received (Date) 9/18/24   Form Filled out Yes, faxed 9/30/24   Placed in provider folder Yes

## 2024-09-24 ENCOUNTER — DOCUMENTATION ONLY (OUTPATIENT)
Dept: INTERNAL MEDICINE | Facility: CLINIC | Age: 69
End: 2024-09-24
Payer: COMMERCIAL

## 2024-09-24 ENCOUNTER — PATIENT OUTREACH (OUTPATIENT)
Dept: CARE COORDINATION | Facility: CLINIC | Age: 69
End: 2024-09-24
Payer: COMMERCIAL

## 2024-09-24 NOTE — PROGRESS NOTES
Type of Form Received: [facility]    Form Received (Date) 9/24/24   Form Filled out Yes, faxed 9/30/24   Placed in provider folder Yes

## 2024-09-24 NOTE — PROGRESS NOTES
"Clinic Care Coordination Contact  Transitions of Care Outreach    Chief Complaint   Patient presents with    Clinic Care Coordination - Initial       Most Recent Admission Date: 8/14/2024   Most Recent Admission Diagnosis: Failure to thrive in adult - R62.7  Acute left ankle pain - M25.572     Most Recent Discharge Date: 8/26/2024   Most Recent Discharge Diagnosis: Failure to thrive in adult - R62.7  Acute left ankle pain - M25.572  Chronic left-sided low back pain with left-sided sciatica - M54.42, G89.29  Chronic respiratory failure with hypoxia - J96.11  Paroxysmal atrial fibrillation - I48.0  Acute midline low back pain with left-sided sciatica - M54.42  Chronic obstructive pulmonary disease, unspecified COPD type - J44.9  Acute pain - R52  Other chronic pain - G89.29  Malnutrition, unspecified type (H24) - E46     Transitions of Care Assessment    Discharge Assessment  How are you doing now that you are home?: RN called and spoke with patient. Pt shares that she is doing well, but since starting eliquis, she is coughing up blood. She states it is daily. She states it is \"a lot\", stops after awhile. No other signs of bleeding or bruising. RN did encourage patient to go to the ER. Patient states that she will see Mitzi in 2 days. RN informed that this is a very serious side effect and if patient notices an increase in this symptom, or bleeding from any other location, patient does need to go to the ER immediatley. Patient states understanding. Pt shares that she has a HC RN who is coming to her house. She states that she has told them about the bleeding and they also requested she go to ER. She declined to them as well and said she will be coming to the clinic this week. Being that pt has refused ER RN stressed importance of coming to clinic visit at the least which patient states she will be here. She states her blood pressure has been high and she has some aching pain, but she doesn't know what it has been. " She is eager to have that checked out on Thursday as well. Pt declined further care coordination.  How are your symptoms? (Red Flag symptoms escalate to triage hotline per guidelines): Improved  Do you know how to contact your clinic care team if you have future questions or changes to your health status? : Yes  Does the patient have their discharge instructions? : Yes  Does the patient have questions regarding their discharge instructions? : No  Were you started on any new medications or were there changes to any of your previous medications? : Yes  Does the patient have all of their medications?: Yes  Do you have questions regarding any of your medications? : No  Do you have all of your needed medical supplies or equipment (DME)?  (i.e. oxygen tank, CPAP, cane, etc.): Yes              Follow up Plan     Discharge Follow-Up  Discharge follow up appointment scheduled in alignment with recommended follow up timeframe or Transitions of Risk Category? (Low = within 30 days; Moderate= within 14 days; High= within 7 days): Yes  Discharge Follow Up Appointment Date: 09/26/24  Discharge Follow Up Appointment Scheduled with?: Primary Care Provider    Future Appointments   Date Time Provider Department Center   9/26/2024  1:00 PM Mitzi Nettles APRN CNP [facility]   9/30/2024 11:45 AM  [facility]   9/30/2024  1:20 PM [id number]   10/2/2024 12:15 PM Zarina Leung MD [facility]   10/8/2024  1:15 PM Neda Covarrubias MD [facility]   10/10/2024 11:30 AM Cleopatra Jolly APRN CNP [facility]   10/15/2024  3:30 PM Mariana López PA-C [facility]       Outpatient Plan as outlined on AVS reviewed with patient.      For any urgent concerns, please contact our 24 hour nurse triage line: [phone]       JT CRAIN RN

## 2024-09-26 ENCOUNTER — OFFICE VISIT (OUTPATIENT)
Dept: INTERNAL MEDICINE | Facility: CLINIC | Age: 69
End: 2024-09-26
Payer: COMMERCIAL

## 2024-09-26 VITALS
HEART RATE: 111 BPM | BODY MASS INDEX: 30.23 KG/M2 | OXYGEN SATURATION: 95 % | DIASTOLIC BLOOD PRESSURE: 74 MMHG | WEIGHT: 187.3 LBS | SYSTOLIC BLOOD PRESSURE: 119 MMHG

## 2024-09-26 DIAGNOSIS — M54.16 LUMBAR RADICULOPATHY: ICD-10-CM

## 2024-09-26 DIAGNOSIS — Z11.59 NEED FOR HEPATITIS C SCREENING TEST: Primary | ICD-10-CM

## 2024-09-26 DIAGNOSIS — E11.42 DIABETIC POLYNEUROPATHY ASSOCIATED WITH TYPE 2 DIABETES MELLITUS (H): ICD-10-CM

## 2024-09-26 DIAGNOSIS — Z23 NEED FOR FIRST BOOSTER DOSE OF COVID-19 VACCINE: ICD-10-CM

## 2024-09-26 PROCEDURE — 91320 SARSCV2 VAC 30MCG TRS-SUC IM: CPT | Performed by: NURSE PRACTITIONER

## 2024-09-26 PROCEDURE — 90480 ADMN SARSCOV2 VAC 1/ONLY CMP: CPT | Performed by: NURSE PRACTITIONER

## 2024-09-26 PROCEDURE — 99215 OFFICE O/P EST HI 40 MIN: CPT | Mod: 25 | Performed by: NURSE PRACTITIONER

## 2024-09-26 PROCEDURE — 99417 PROLNG OP E/M EACH 15 MIN: CPT | Performed by: NURSE PRACTITIONER

## 2024-09-26 RX ORDER — OXYCODONE HYDROCHLORIDE 5 MG/1
5 TABLET ORAL EVERY 6 HOURS PRN
Qty: 60 TABLET | Refills: 0 | Status: SHIPPED | OUTPATIENT
Start: 2024-09-26 | End: 2024-10-11

## 2024-09-26 NOTE — PROGRESS NOTES
Assessment & Plan   Diabetic neuropathy (H)  - Albumin Random Urine Quantitative with Creat Ratio; Future  - Hospital Bed Order for DME - ONLY FOR DME  - Hemoglobin A1c; Future    Need for first booster dose of COVID-19 vaccine  - COVID-19 12+ (PFIZER)    Lumbar radiculopathy  Hospital bed DME initiated.  She lives alone and is having extreme back pain.   - oxyCODONE (ROXICODONE) 5 MG tablet; Take 1 tablet (5 mg) by mouth every 6 hours as needed for severe pain.  - She is asking for a back brace. I suggested she speak with Neurolo[facility]surgery on 10/10 regarding whether a brace would be helpful, and what kind.. For now I prescribed oxy 5 mg 4 x a day.     SOB  - she is trying to use the least amount of supplemental 02 but I encouraged her to use a little more when ambulating.  She keeps her phone with her.     I spent a total of 55 minutes in the care of this pt during today's office visit. This time includes reviewing the patient's chart and prior history, obtaining a history, performing an examination and evaluation and counseling the patient. This time also includes ordering medications or tests necessary in addition to communication to other member's of the patient's health care team. Time spent in documentation and care coordination is included.     Mitzi Overkamp APRN, CNP    Subjective   Jenn is a 69 year old, presenting for the following health issues:  Hospital F/U (Pt was in ED 8/14 - 8/26 for Chronic left-sided low back pain with left-sided sciatica. Transitional care after. Pt reports still in significant bilateral lower back pain today, lack of mobilty, oxycodone has been ineffective. Pt reports sprains in both ankles. ) and Recheck Medication (Pt reports concerns about blood thinner side effects - head aches, coughing up blood (last episode was 1 day ago))      9/26/2024    12:22 PM   Additional Questions   Roomed by loreto Barajas Markus is a 69 year old female   Hospital F/U (Pt was in ED 8/14 -  8/26 for Chronic left-sided low back pain with left-sided sciatica. Pain medication given on discharge did not seem to touch the pain so is going without. She is taking muscle relaxer's and Duloxepine.  The pain shoots down  her left leg and it feels like her legs will collapse. She rates her pain as > 10. Pain with sitting, and in all positions.    She has coughed up blood 4-5 times the last few days.  The last episode was yesterday. It was not bright red but rather dark. She is on Eliquis. She is scheduled for a Chest CT scan 9/30.     Jenn Aparicio is a 69 year old female   Hospital F/U (Pt was in ED 8/14 - 8/26 for Chronic left-sided low back pain with left-sided sciatica. Pain medication given on discharge did not seem to touch the pain so is going without. She is taking muscle relaxer's and Duloxepine.  The pain shoots down  her left leg and it feels like her legs will collapse. She rates her pain as > 10. Pain with sitting, and in all positions.    She has coughed up blood 4-5 times the last few days.  The last episode was yesterday. It was not bright red but rather dark. She is on Eliquis. She is scheduled for a Chest CT scan 9/30.    Her glucose has been controlled with a.m. averages around 142.      She becomes SOB quickly when ambulating..  Feels like her heart becomes irregular with effort. She is wearing a Zio patch.    Primary adenoca of the lung  T2 DM   Pulmonary emphysema  Chronic left sided low back pain L4-5        Objective    /74 (BP Location: Right arm, Patient Position: Sitting, Cuff Size: Adult Large)   Pulse 111   Wt 85 kg (187 lb 4.8 oz)   SpO2 95%   BMI 30.23 kg/m    Body mass index is 30.23 kg/m .  Physical Exam   GENERAL: alert . Sitting in wheelchair. Does not appear comfortable.  RESP: lungs clear to auscultation - no rales, rhonchi or wheezes  CV: regular rate and rhythm, normal S1 S2, no S3 or S4, no murmur, click or rub, no peripheral edema.  She has a zio patch on   MS: in  wheelchair, no edema  PSYCH: mentation appears normal, affect normal/bright.  She is very pleasant.             Signed Electronically by: BENOIT Ruiz CNP

## 2024-09-27 ENCOUNTER — MEDICAL CORRESPONDENCE (OUTPATIENT)
Dept: HEALTH INFORMATION MANAGEMENT | Facility: CLINIC | Age: 69
End: 2024-09-27
Payer: COMMERCIAL

## 2024-09-27 DIAGNOSIS — Z53.9 DIAGNOSIS NOT YET DEFINED: Primary | ICD-10-CM

## 2024-09-27 PROCEDURE — G0179 MD RECERTIFICATION HHA PT: HCPCS | Performed by: NURSE PRACTITIONER

## 2024-09-30 ENCOUNTER — MEDICAL CORRESPONDENCE (OUTPATIENT)
Dept: HEALTH INFORMATION MANAGEMENT | Facility: CLINIC | Age: 69
End: 2024-09-30

## 2024-09-30 ENCOUNTER — LAB (OUTPATIENT)
Dept: LAB | Facility: CLINIC | Age: 69
End: 2024-09-30
Attending: INTERNAL MEDICINE
Payer: COMMERCIAL

## 2024-09-30 ENCOUNTER — ANCILLARY PROCEDURE (OUTPATIENT)
Dept: CT IMAGING | Facility: CLINIC | Age: 69
End: 2024-09-30
Attending: INTERNAL MEDICINE
Payer: COMMERCIAL

## 2024-09-30 DIAGNOSIS — C34.31 MALIGNANT NEOPLASM OF LOWER LOBE OF RIGHT LUNG (H): ICD-10-CM

## 2024-09-30 DIAGNOSIS — E11.42 DIABETIC POLYNEUROPATHY ASSOCIATED WITH TYPE 2 DIABETES MELLITUS (H): ICD-10-CM

## 2024-09-30 LAB
ALBUMIN SERPL BCG-MCNC: 4.2 G/DL (ref 3.5–5.2)
ALP SERPL-CCNC: 100 U/L (ref 40–150)
ALT SERPL W P-5'-P-CCNC: ABNORMAL U/L
ANION GAP SERPL CALCULATED.3IONS-SCNC: 11 MMOL/L (ref 7–15)
AST SERPL W P-5'-P-CCNC: 29 U/L (ref 0–45)
BASOPHILS # BLD AUTO: 0.1 10E3/UL (ref 0–0.2)
BASOPHILS NFR BLD AUTO: 1 %
BILIRUB SERPL-MCNC: 0.2 MG/DL
BUN SERPL-MCNC: 6.2 MG/DL (ref 8–23)
CALCIUM SERPL-MCNC: 9.3 MG/DL (ref 8.8–10.4)
CHLORIDE SERPL-SCNC: 100 MMOL/L (ref 98–107)
CREAT BLD-MCNC: 0.6 MG/DL (ref 0.5–1)
CREAT SERPL-MCNC: 0.5 MG/DL (ref 0.51–0.95)
EGFRCR SERPLBLD CKD-EPI 2021: >60 ML/MIN/1.73M2
EGFRCR SERPLBLD CKD-EPI 2021: >90 ML/MIN/1.73M2
EOSINOPHIL # BLD AUTO: 0.7 10E3/UL (ref 0–0.7)
EOSINOPHIL NFR BLD AUTO: 9 %
ERYTHROCYTE [DISTWIDTH] IN BLOOD BY AUTOMATED COUNT: 13.7 % (ref 10–15)
EST. AVERAGE GLUCOSE BLD GHB EST-MCNC: 171 MG/DL
GLUCOSE SERPL-MCNC: 150 MG/DL (ref 70–99)
HBA1C MFR BLD: 7.6 %
HCO3 SERPL-SCNC: 30 MMOL/L (ref 22–29)
HCT VFR BLD AUTO: 43.5 % (ref 35–47)
HGB BLD-MCNC: 13.2 G/DL (ref 11.7–15.7)
IMM GRANULOCYTES # BLD: 0 10E3/UL
IMM GRANULOCYTES NFR BLD: 0 %
LYMPHOCYTES # BLD AUTO: 2 10E3/UL (ref 0.8–5.3)
LYMPHOCYTES NFR BLD AUTO: 27 %
MCH RBC QN AUTO: 27.6 PG (ref 26.5–33)
MCHC RBC AUTO-ENTMCNC: 30.3 G/DL (ref 31.5–36.5)
MCV RBC AUTO: 91 FL (ref 78–100)
MONOCYTES # BLD AUTO: 0.5 10E3/UL (ref 0–1.3)
MONOCYTES NFR BLD AUTO: 7 %
NEUTROPHILS # BLD AUTO: 4.2 10E3/UL (ref 1.6–8.3)
NEUTROPHILS NFR BLD AUTO: 56 %
NRBC # BLD AUTO: 0 10E3/UL
NRBC BLD AUTO-RTO: 0 /100
PLATELET # BLD AUTO: 233 10E3/UL (ref 150–450)
POTASSIUM SERPL-SCNC: 4.2 MMOL/L (ref 3.4–5.3)
PROT SERPL-MCNC: 7.7 G/DL (ref 6.4–8.3)
RBC # BLD AUTO: 4.78 10E6/UL (ref 3.8–5.2)
SODIUM SERPL-SCNC: 141 MMOL/L (ref 135–145)
WBC # BLD AUTO: 7.4 10E3/UL (ref 4–11)

## 2024-09-30 PROCEDURE — 80048 BASIC METABOLIC PNL TOTAL CA: CPT | Performed by: PATHOLOGY

## 2024-09-30 PROCEDURE — 83036 HEMOGLOBIN GLYCOSYLATED A1C: CPT

## 2024-09-30 PROCEDURE — 71260 CT THORAX DX C+: CPT | Mod: GC | Performed by: RADIOLOGY

## 2024-09-30 PROCEDURE — 36415 COLL VENOUS BLD VENIPUNCTURE: CPT

## 2024-09-30 PROCEDURE — 85025 COMPLETE CBC W/AUTO DIFF WBC: CPT

## 2024-09-30 PROCEDURE — 84155 ASSAY OF PROTEIN SERUM: CPT

## 2024-09-30 PROCEDURE — 74177 CT ABD & PELVIS W/CONTRAST: CPT | Mod: GC | Performed by: RADIOLOGY

## 2024-09-30 RX ORDER — IOPAMIDOL 755 MG/ML
92 INJECTION, SOLUTION INTRAVASCULAR ONCE
Status: COMPLETED | OUTPATIENT
Start: 2024-09-30 | End: 2024-09-30

## 2024-09-30 RX ADMIN — IOPAMIDOL 92 ML: 755 INJECTION, SOLUTION INTRAVASCULAR at 12:35

## 2024-09-30 NOTE — NURSING NOTE
Chief Complaint   Patient presents with    Blood Draw     Blood drawn and PIV placed by ROMAIN.      ADELFO Garay LPN

## 2024-10-02 ENCOUNTER — VIRTUAL VISIT (OUTPATIENT)
Dept: ONCOLOGY | Facility: CLINIC | Age: 69
End: 2024-10-02
Attending: INTERNAL MEDICINE
Payer: COMMERCIAL

## 2024-10-02 VITALS
RESPIRATION RATE: 18 BRPM | WEIGHT: 187 LBS | SYSTOLIC BLOOD PRESSURE: 128 MMHG | OXYGEN SATURATION: 97 % | DIASTOLIC BLOOD PRESSURE: 77 MMHG | TEMPERATURE: 98.3 F | HEART RATE: 95 BPM | BODY MASS INDEX: 30.18 KG/M2

## 2024-10-02 DIAGNOSIS — M54.42 ACUTE MIDLINE LOW BACK PAIN WITH LEFT-SIDED SCIATICA: ICD-10-CM

## 2024-10-02 DIAGNOSIS — C34.31 MALIGNANT NEOPLASM OF LOWER LOBE OF RIGHT LUNG (H): Primary | ICD-10-CM

## 2024-10-02 DIAGNOSIS — G89.29 OTHER CHRONIC PAIN: ICD-10-CM

## 2024-10-02 PROCEDURE — 99215 OFFICE O/P EST HI 40 MIN: CPT | Mod: 95 | Performed by: INTERNAL MEDICINE

## 2024-10-02 PROCEDURE — G2211 COMPLEX E/M VISIT ADD ON: HCPCS | Mod: 95 | Performed by: INTERNAL MEDICINE

## 2024-10-02 RX ORDER — DULOXETIN HYDROCHLORIDE 60 MG/1
60 CAPSULE, DELAYED RELEASE ORAL 2 TIMES DAILY
Qty: 60 CAPSULE | Refills: 0 | Status: SHIPPED | OUTPATIENT
Start: 2024-10-02

## 2024-10-02 RX ORDER — CYCLOBENZAPRINE HCL 5 MG
5 TABLET ORAL 2 TIMES DAILY PRN
Qty: 60 TABLET | Refills: 0 | Status: SHIPPED | OUTPATIENT
Start: 2024-10-02

## 2024-10-02 ASSESSMENT — PAIN SCALES - GENERAL: PAINLEVEL: NO PAIN (0)

## 2024-10-02 NOTE — PROGRESS NOTES
[facility]  [address] [place] [id number]  Phone: [phone]  Fax: [phone]    PATIENT NAME: Jenn Aparicio  MRN # [id number]   DATE OF VISIT: October 2, 2024  YOB: 1955     CANCER TYPE: Lung adenocarcinoma  STAGE: IA2 [id number] poorly diff adeno RLL, then [id number] IA2 suspected NSCLC RUL, medically inoperable      ECOG PS: 2 (COPD)     PD-L1: N/A  Lung panel: N/A  NGS: N/A     SUMMARY  12/24/15 PET/CT  1/13/15 CT lung bx. Adenocarcinoma  2/8/16 R thoracotomy, RLL lobectomy (Dr. Cunha). Adenocarcinoma, 1.1 cm, assoicated with atypical adenomatous hyperplasia  10/18/19 CTA for shortness of breath. New 1.3 cm RUL nodule  11/2019 PET/CT. 1.1 cm RUL hypermetabolic nodule (SUV 12.6)  12/26/19 Brain MRI negative for mets  1/14~1/28/20 SBRT to RUL nodule, 5000 cGy, every other day, 1000 [place] [facility] [place] bx due to O2 dependence and too much risk  8/19/20 First visit with me   11/29/21 CT chest. New 10 mm RUL nodule  1/11/22 CT chest. New 7.2 mm RUL nodule, unchanged RUL nodules  3/31/22 CT chest. New RUL nodule from Jan 2022 7-->10 mm, new 5 mm ROBERT nodule  5/20~5/24/22 [facility] for COPD exacerbation.   6/24/22 CT chest non contrast.   8/16/22 EGD. 3 cm hiatal hernia, o/w normal  8/31/22 CT chest. 2.5 x 1.3 cm R midlung subpleural nodularity (4:98) previously 2.3 x 1.1 cm, slightly increased solid component   10/5/22 PET/CT. 2 x 1.3 cm irregular opacity posterior RUL (SUV 2.46), 2.4 x 0.8 cm linear opacity (SUV 4.76); there was bronchiectasia on prior imaging. Stable 1.1 x 0.8 cm non FDG avid RUL opacity and unchanged 4 mm posterior RUL nodule. No adenopathy. Inferior right breast uptake (SUV 6.09) likely infectious or inflammatory.   2/3~2/9/23 [facility] for COPD exacerbation  2/4/23 CTA during hospitalization. No significant change in 2.3 x 1.5 cm spiculated nodule in the right upper lobe (series 7 image 94) with surrounding linear parenchymal opacities  and subtle nodularity  2/20~2/24/23 [facility] for COPD exacerbation.   3/1/23 CT chest non contrast. 2.3 cm posterior RUL irregular nodule unchanged from 12/1/22 however slightly increased soft tissue component compared to 10/5/22 PET. Stable ROBERT mucous plugging with micronodularity.  3/22/23 CT chest. Stable compared to 3/1/23  5/25/23 CT chest abd w/contrast. Unchanged 1.3 x 0.5 cm R apical nodule, unchnaged 2.4 x 1.3 cm R upper nodular consolidation. New nodular/masslike area of consolidation anteriorly and laterally measuring 3.3 x 1.4 cm, not present on CT 3/22/23. No adenopathy. Consider PET or close surveillance CT in 3 months  7/19/23 Bronch, EBUS (Dr. Garcia). 11L negative, scant cells. 4L unsatisfactory for eval. 8 negative, scant cellularity, 4R unsatisfactory, 11R negative, scan cellularity, RUL negative, showed acute inflammation, limited by scant cellularity.  5/15/24 CT CAP. Slightly enlarging RUL consolidation, several new indeterminate RML nodules, other stable nodules, new L2 lytic lesion  6/11/24 PET. Poor quality and low sensitivity due to hyperglycemia. New 0.8 x 1.0 cm R apical nodule (SUV 3.1), interval decreased size/uptake of patchy RUL GGO (SUV 2.4, previously 6.1). Few scattered nodules in both upper lobes with near complete resolution of FDG avidity, mild uptake associated with the lucent focus along the superior endplate of L2 (SUV 3.8).  6/26/24 MRI L spine. T1/2 hypointense, STIR hyperintense and enhancing lytic lesion L2 c/w mets. Mod neural foramenal steonosis bilaterally L3- and R L4-5.  7/22/24 L2 bx (IR). Path: negative for malignancy, showed fragments of benign bone and marrow tissue  814~8/26/2024 [facility] for A-fib with RVR, acute on chronic lower back pain, DARIO, new LLE weakness and numbness    ASSESSMENT AND PLAN  NSCLC, adeno, RUL, L2 lesion: See extensive prior notes but in summary, RUL looks a little worse. Now with hemoptysis as well. See below. Hard to get bx with O2-dependent  COPD, etc. RUL findings could be related to microaspiration, etc., but as I've discussed with Jenn below, despite us trying very hard to establish recurrence, and not quite getting there, I am still worried about that RUL.Not pneumonitis. I'm less worried about the back - looks stable on my review. CT chest non contrast in 6-8 weeks to ensure not getting worse.     Hemoptysis: Could be related to the posterior RUL peripheral changes but I think it's a little less likely, however, is very well still possible. I don't see endobronchial lesions on my close review during our visit of her CT. Hold asa, let Dr. Nettles know, continue apix for now. If persists, then refer to Pulmonary for bronch (I think can do despite on O2, will have to see) and decrease apix to once daily. If resolves, then will continue to monitor. Discussed seeking emergency care should it worsen, if she develops more shortness of breath, etc.     Weight loss: About 20 # in the last month according to weights in epic. Worrisome, eating ok. Jardiance is not new. Albumin ok. Monitor closely in the context of my long-term concern for recurrence.    LBP: Exacerbated. Per Dr. Nettles     COPD: O2 dependent and intermittent exacerbations. Continues to see Pulmonologist regularly. Not discussed today other than in the context of the above.    Remainder of issues not actively discussed today  DM2: Seeing Dr. Shabazz in Endocrine  Deconditioning: From last visit, getting a little worse over the last 6 months, but not in a position to do PT and not homebound and is stable since last visit.  Dysphagia: Met with Dr. Briseno in GI 7/18/23, has had pretty extensive evaluation before, last discussed in 2023.  Peripheral neuropathy: Mostly driven by DM    The longitudinal plan of care for the condition(s) below were addressed during this visit. Due to the added complexity in care, I will continue to support Jenn in the subsequent management of this condition(s) and  with the ongoing continuity of care of this condition(s): adenocarcinoma of the lung       40 minutes spent by me on the date of the encounter doing chart review, review of outside records, review of test results, interpretation of tests, patient visit, documentation, orders, discussion with other provider(s)    Zarina Leung MD  Associate Professor of Medicine  Hematology, Oncology and Transplantation      SUBJECTIVE  Jenn returns for follow up of h/o lung adenocarcinoma   Hospitalized in Aug - see discharge summary, reviewed  Started coughing up blood last week. Red. Shows me a photograph  Breathing unchanged though  Back is still the biggest factor bugging her. Working with Dr. Nettles on that   Losing weight - unintentional, didn't realize until she was told what her weight was. Eating ok     PAST MEDICAL HISTORY  Lung adenocarcinoma  DM2 with neuropathy  HCV IA, undetectable in 2019, treated  COPD. O2 dependent since late 2018. PFT 11/26/19. FEV1 0.64 (30%), FVC 1.69 (63%), DLCOunc 9.8 (38%).  HTN  H/o ITP  H/o disk herniation  Ankle surgery  Median neuropathy R  H/o sessile colon polyps 2014, h/o adenomas before that. [place] [facility] [place] Sessile serated adenoma x 1, tubular adenoma x 3  H/o chronic LBP  Dyslipidemia  Cataracts    CURRENT OUTPATIENT MEDICATIONS  Reviewed    ALLERGIES  Allergies   Allergen Reactions    Aspirin      325mg     Bee Venom Anaphylaxis    Penicillins Hives    Acetaminophen GI Disturbance    Azithromycin Dizziness    Colon Care     Interferons Dermatitis    Metformin GI Disturbance      PHYSICAL EXAM  /77 (BP Location: Right arm, Patient Position: Right side, Cuff Size: Adult Regular)   Pulse 95   Temp 98.3  F (36.8  C) (Oral)   Resp 18   Wt 84.8 kg (187 lb)   SpO2 97%   BMI 30.18 kg/m    GEN: NAD  HEENT: EOMI, no icterus, injection or pallor  LUNGS: decreased but clear  CV: irregularly irregular, mildly tachycardic   EXT: no edema  NEURO: alert  SKIN: no  tamela    LABORATORY AND IMAGING STUDIES    Labs throughout hospitalization were independently reviewed and interpreted by me    9/30/24   A1c 7.6  CBC p/d unremarkable  CMP CO2 30 (chronic), Cr 0.5 (chronic). Peaked at 1.35 on 8/17/24 than came quickly back down to 0.48 8/18    CT Chest/Abdomen/Pelvis w Contrast  Narrative: EXAMINATION: CT CHEST/ABDOMEN/PELVIS W CONTRAST 9/30/2024 12:51 PM    INDICATION: Restage lung adenocarcinoma - s/p RLL lobectomy and SBRT  RUL. New R apical nodule, L2 lesions on last imaging. L2 bx July 2024  negative for malig.    COMPARISON STUDY: CT chest abdomen pelvis 5/15/2024, PET CT 6/11/2024    TECHNIQUE: CT scan of the chest, abdomen and pelvis was performed on  multidetector CT scanner using volumetric acquisition technique and  images were reconstructed in multiple planes with variable thickness  and reviewed on dedicated workstations. CONTRAST: Isovue 370 92 mL  injected IV without oral contrast. CT scan radiation dose is optimized  to minimum requisite dose using automated dose modulation techniques.    FINDINGS:    Lungs/pleura: Mild mucus/intraluminal debris in the inferior trachea  immediately superior to the rhina. Airways are otherwise clear. Upper  lobe predominant emphysematous changes, similar to prior. No pleural  effusion or visualized pneumothorax. Postsurgical changes of right  lower lobectomy.    Increased reticulonodular and consolidative opacities of the posterior  right upper lobe with adjacent subpleural thickening, including a  consolidative region measuring 3.7 x 2.2 x 1.3 cm (series 4 image 89  and coronal image 79), roughly corresponding to region of intermediate  uptake and nodular groundglass opacity from prior PET/CT, previously  2.7 x 4.1 cm. New inferomedial extension of this consolidation with a  new nodular and groundglass area measuring 2.2 x 3.8 cm and posterior  subpleural thickening not seen on prior PET (series 4 image 105).    Decreased size of  a 4 mm right upper lobe perivascular solid nodule  (series 4 image 33) corresponding to 1 cm region of FDG avid  nodularity on prior PET/CT, previously 10 mm.     10 x 5 mm reticular nodular region in the posterior right apex (series  4 image 24) did not demonstrate focal radiotracer uptake on prior  PET/CT and is stable since at least April 2021, most consistent with  scarring. 3 mm posterior right upper lobe pleural adjacent nodule  (series 4 image 49) is also stable since at least April 2021. 2 mm  calcified granuloma in the anterior medial right upper lobe (series 4  image 90). 3 mm solid perivascular nodule in the posterior medial  right middle lobe (series 4 image 159). Region of grouped 2 and 3 mm  nodules in the posterior left upper lobe adjacent to the oblique  fissure, best appreciated on maximum intensity projection image 37  through 41, are essentially unchanged from prior exam. Stable 7 mm  left upper lobe solid nodule (series 4 image 98).    Mediastinum: Unremarkable thyroid. Heart size is within normal limits.  No pericardial effusion. The ascending thoracic aorta and main  pulmonary artery are not dilated. Bovine aortic arch. Coronary artery  calcifications. Calcifications of the aortic arch and at the origins  of the great vessels. Stable calcifications within the mediastinum and  hilar regions. No enlarging lymphadenopathy. Esophagus is within  normal limits.    Liver: No mass. No intrahepatic biliary ductal dilation.     Biliary System: Normal gallbladder. No extrahepatic biliary ductal  dilation.    Pancreas: No mass or pancreatic ductal dilation.     Adrenal glands: No adrenal nodules.     Spleen: Normal.     Kidneys: No suspicious mass, obstructing calculus or hydronephrosis.  Calcifications in the kidneys appear vascular in nature.    Gastrointestinal tract: Normal appendix. Normal caliber small bowel.  Colonic diverticula without diverticulitis.     Mesentery/peritoneum/retroperitoneum: No  mass. No free fluid or air.      Lymph nodes: Prominent but nonenlarged bilateral inguinal lymph nodes.  Prominent left pelvic node measuring up to 8 mm (series 3 image 563).    Vasculature: Moderate aortoiliac atherosclerotic calcifications.  Calcification at the origin of the celiac trunk. Major vessels  including the portal vein, splenic vein, and superior mesenteric vein  are patent.      Pelvis: Decompressed bladder is grossly normal. Pelvic phleboliths. No  adnexal mass or free pelvic fluid.        Osseous structures: Osteopenia. Degenerative changes in the spine.  L4-5 anterolisthesis. Stable lytic lesion of the L2 superior endplate.      Soft tissues: No concerning soft tissue lesions. Stable small  subcutaneous/cutaneous nodule overlying the left lateral aspect of the  sternum, measuring up to 6 mm, unchanged since 2020 and likely  representing a sebaceous/epidermal inclusion cyst.  Impression: IMPRESSION:   1. Increased reticulonodular and consolidative opacities in the  posterior right upper lobe, including a new region of inferomedial  extension, concerning for worsening malignancy. The differential  includes radiation fibrosis with new superimposed infection.  2. Decreased size of the previously seen right apical nodule. The  remainder of assessed pulmonary nodules are essentially stable from  prior imaging.  3. Stable lytic lesion of the L2 superior endplate, previously  biopsied with benign results and possibly representing a large  Schmorl's node. Attention on follow-up.    I have personally reviewed the examination and initial interpretation  and I agree with the findings.    RICARDA EUBANKS MD         SYSTEM ID:  [id number]     CT CAP 9/30/24 was personally reviewed and interpreted by me. Agree there's more consolidation in the posterior lower part of the RUL.

## 2024-10-02 NOTE — LETTER
10/2/2024      Jenn Aparicio  [address]  [place] [address]      Dear Colleague,    Thank you for referring your patient, Jenn Aparicio, to the [facility]. Please see a copy of my visit note below.        [facility]  [address] [address]  Phone: [phone]  Fax: [phone]    PATIENT NAME: Jenn Aparicio  MRN # [id number]   DATE OF VISIT: October 2, 2024  YOB: 1955     CANCER TYPE: Lung adenocarcinoma  STAGE: IA2 [id number] poorly diff adeno RLL, then [id number] IA2 suspected NSCLC RUL, medically inoperable      ECOG PS: 2 (COPD)     PD-L1: N/A  Lung panel: N/A  NGS: N/A     SUMMARY  12/24/15 PET/CT  1/13/15 CT lung bx. Adenocarcinoma  2/8/16 R thoracotomy, RLL lobectomy (Dr. Cunha). Adenocarcinoma, 1.1 cm, assoicated with atypical adenomatous hyperplasia  10/18/19 CTA for shortness of breath. New 1.3 cm RUL nodule  11/2019 PET/CT. 1.1 cm RUL hypermetabolic nodule (SUV 12.6)  12/26/19 Brain MRI negative for mets  1/14~1/28/20 SBRT to RUL nodule, 5000 cGy, every other day, 1000 cGy (Dr. Currie at [facility]). No bx due to O2 dependence and too much risk  8/19/20 First visit with me   11/29/21 CT chest. New 10 mm RUL nodule  1/11/22 CT chest. New 7.2 mm RUL nodule, unchanged RUL nodules  3/31/22 CT chest. New RUL nodule from Jan 2022 7-->10 mm, new 5 mm ROBERT nodule  5/20~5/24/22 [facility] for COPD exacerbation.   6/24/22 CT chest non contrast.   8/16/22 EGD. 3 cm hiatal hernia, o/w normal  8/31/22 CT chest. 2.5 x 1.3 cm R midlung subpleural nodularity (4:98) previously 2.3 x 1.1 cm, slightly increased solid component   10/5/22 PET/CT. 2 x 1.3 cm irregular opacity posterior RUL (SUV 2.46), 2.4 x 0.8 cm linear opacity (SUV 4.76); there was bronchiectasia on prior imaging. Stable 1.1 x 0.8 cm non FDG avid RUL opacity and unchanged 4 mm posterior RUL nodule. No adenopathy. Inferior right breast uptake (SUV 6.09) likely  infectious or inflammatory.   2/3~2/9/23 [facility] for COPD exacerbation  2/4/23 CTA during hospitalization. No significant change in 2.3 x 1.5 cm spiculated nodule in the right upper lobe (series 7 image 94) with surrounding linear parenchymal opacities and subtle nodularity  2/20~2/24/23 [facility] for COPD exacerbation.   3/1/23 CT chest non contrast. 2.3 cm posterior RUL irregular nodule unchanged from 12/1/22 however slightly increased soft tissue component compared to 10/5/22 PET. Stable ROBERT mucous plugging with micronodularity.  3/22/23 CT chest. Stable compared to 3/1/23  5/25/23 CT chest abd w/contrast. Unchanged 1.3 x 0.5 cm R apical nodule, unchnaged 2.4 x 1.3 cm R upper nodular consolidation. New nodular/masslike area of consolidation anteriorly and laterally measuring 3.3 x 1.4 cm, not present on CT 3/22/23. No adenopathy. Consider PET or close surveillance CT in 3 months  7/19/23 Bronch, EBUS (Dr. Garcia). 11L negative, scant cells. 4L unsatisfactory for eval. 8 negative, scant cellularity, 4R unsatisfactory, 11R negative, scan cellularity, RUL negative, showed acute inflammation, limited by scant cellularity.  5/15/24 CT CAP. Slightly enlarging RUL consolidation, several new indeterminate RML nodules, other stable nodules, new L2 lytic lesion  6/11/24 PET. Poor quality and low sensitivity due to hyperglycemia. New 0.8 x 1.0 cm R apical nodule (SUV 3.1), interval decreased size/uptake of patchy RUL GGO (SUV 2.4, previously 6.1). Few scattered nodules in both upper lobes with near complete resolution of FDG avidity, mild uptake associated with the lucent focus along the superior endplate of L2 (SUV 3.8).  6/26/24 MRI L spine. T1/2 hypointense, STIR hyperintense and enhancing lytic lesion L2 c/w mets. Mod neural foramenal steonosis bilaterally L3- and R L4-5.  7/22/24 L2 bx (IR). Path: negative for malignancy, showed fragments of benign bone and marrow tissue  814~8/26/2024 [facility] for A-fib with RVR, acute on chronic  lower back pain, DARIO, new LLE weakness and numbness    ASSESSMENT AND PLAN  NSCLC, adeno, RUL, L2 lesion: See extensive prior notes but in summary, RUL looks a little worse. Now with hemoptysis as well. See below. Hard to get bx with O2-dependent COPD, etc. RUL findings could be related to microaspiration, etc., but as I've discussed with Jenn below, despite us trying very hard to establish recurrence, and not quite getting there, I am still worried about that RUL.Not pneumonitis. I'm less worried about the back - looks stable on my review. CT chest non contrast in 6-8 weeks to ensure not getting worse.     Hemoptysis: Could be related to the posterior RUL peripheral changes but I think it's a little less likely, however, is very well still possible. I don't see endobronchial lesions on my close review during our visit of her CT. Hold asa, let Dr. Nettles know, continue apix for now. If persists, then refer to Pulmonary for bronch (I think can do despite on O2, will have to see) and decrease apix to once daily. If resolves, then will continue to monitor. Discussed seeking emergency care should it worsen, if she develops more shortness of breath, etc.     Weight loss: About 20 # in the last month according to weights in epic. Worrisome, eating ok. Jardiance is not new. Albumin ok. Monitor closely in the context of my long-term concern for recurrence.    LBP: Exacerbated. Per Dr. Nettles     COPD: O2 dependent and intermittent exacerbations. Continues to see Pulmonologist regularly. Not discussed today other than in the context of the above.    Remainder of issues not actively discussed today  DM2: Seeing Dr. Shabazz in Endocrine  Deconditioning: From last visit, getting a little worse over the last 6 months, but not in a position to do PT and not homebound and is stable since last visit.  Dysphagia: Met with Dr. Briseno in GI 7/18/23, has had pretty extensive evaluation before, last discussed in 2023.  Peripheral  neuropathy: Mostly driven by DM    The longitudinal plan of care for the condition(s) below were addressed during this visit. Due to the added complexity in care, I will continue to support Jenn in the subsequent management of this condition(s) and with the ongoing continuity of care of this condition(s): adenocarcinoma of the lung       40 minutes spent by me on the date of the encounter doing chart review, review of outside records, review of test results, interpretation of tests, patient visit, documentation, orders, discussion with other provider(s)    Zarina Leung MD  Associate Professor of Medicine  Hematology, Oncology and Transplantation      SUBJECTIVE  Jenn returns for follow up of h/o lung adenocarcinoma   Hospitalized in Aug - see discharge summary, reviewed  Started coughing up blood last week. Red. Shows me a photograph  Breathing unchanged though  Back is still the biggest factor bugging her. Working with Dr. Nettles on that   Losing weight - unintentional, didn't realize until she was told what her weight was. Eating ok     PAST MEDICAL HISTORY  Lung adenocarcinoma  DM2 with neuropathy  HCV IA, undetectable in 2019, treated  COPD. O2 dependent since late 2018. PFT 11/26/19. FEV1 0.64 (30%), FVC 1.69 (63%), DLCOunc 9.8 (38%).  HTN  H/o ITP  H/o disk herniation  Ankle surgery  Median neuropathy R  H/o sessile colon polyps 2014, h/o adenomas before that. Colonoscopy 2/7/18 @ [facility]. Sessile serated adenoma x 1, tubular adenoma x 3  H/o chronic LBP  Dyslipidemia  Cataracts    CURRENT OUTPATIENT MEDICATIONS  Reviewed    ALLERGIES  Allergies   Allergen Reactions     Aspirin      325mg      Bee Venom Anaphylaxis     Penicillins Hives     Acetaminophen GI Disturbance     Azithromycin Dizziness     Colon Care      Interferons Dermatitis     Metformin GI Disturbance      PHYSICAL EXAM  /77 (BP Location: Right arm, Patient Position: Right side, Cuff Size: Adult Regular)   Pulse 95   Temp 98.3  F  (36.8  C) (Oral)   Resp 18   Wt 84.8 kg (187 lb)   SpO2 97%   BMI 30.18 kg/m    GEN: NAD  HEENT: EOMI, no icterus, injection or pallor  LUNGS: decreased but clear  CV: irregularly irregular, mildly tachycardic   EXT: no edema  NEURO: alert  SKIN: no rashes    LABORATORY AND IMAGING STUDIES    Labs throughout hospitalization were independently reviewed and interpreted by me    9/30/24   A1c 7.6  CBC p/d unremarkable  CMP CO2 30 (chronic), Cr 0.5 (chronic). Peaked at 1.35 on 8/17/24 than came quickly back down to 0.48 8/18    CT Chest/Abdomen/Pelvis w Contrast  Narrative: EXAMINATION: CT CHEST/ABDOMEN/PELVIS W CONTRAST 9/30/2024 12:51 PM    INDICATION: Restage lung adenocarcinoma - s/p RLL lobectomy and SBRT  RUL. New R apical nodule, L2 lesions on last imaging. L2 bx July 2024  negative for malig.    COMPARISON STUDY: CT chest abdomen pelvis 5/15/2024, PET CT 6/11/2024    TECHNIQUE: CT scan of the chest, abdomen and pelvis was performed on  multidetector CT scanner using volumetric acquisition technique and  images were reconstructed in multiple planes with variable thickness  and reviewed on dedicated workstations. CONTRAST: Isovue 370 92 mL  injected IV without oral contrast. CT scan radiation dose is optimized  to minimum requisite dose using automated dose modulation techniques.    FINDINGS:    Lungs/pleura: Mild mucus/intraluminal debris in the inferior trachea  immediately superior to the rhina. Airways are otherwise clear. Upper  lobe predominant emphysematous changes, similar to prior. No pleural  effusion or visualized pneumothorax. Postsurgical changes of right  lower lobectomy.    Increased reticulonodular and consolidative opacities of the posterior  right upper lobe with adjacent subpleural thickening, including a  consolidative region measuring 3.7 x 2.2 x 1.3 cm (series 4 image 89  and coronal image 79), roughly corresponding to region of intermediate  uptake and nodular groundglass opacity from  prior PET/CT, previously  2.7 x 4.1 cm. New inferomedial extension of this consolidation with a  new nodular and groundglass area measuring 2.2 x 3.8 cm and posterior  subpleural thickening not seen on prior PET (series 4 image 105).    Decreased size of a 4 mm right upper lobe perivascular solid nodule  (series 4 image 33) corresponding to 1 cm region of FDG avid  nodularity on prior PET/CT, previously 10 mm.     10 x 5 mm reticular nodular region in the posterior right apex (series  4 image 24) did not demonstrate focal radiotracer uptake on prior  PET/CT and is stable since at least April 2021, most consistent with  scarring. 3 mm posterior right upper lobe pleural adjacent nodule  (series 4 image 49) is also stable since at least April 2021. 2 mm  calcified granuloma in the anterior medial right upper lobe (series 4  image 90). 3 mm solid perivascular nodule in the posterior medial  right middle lobe (series 4 image 159). Region of grouped 2 and 3 mm  nodules in the posterior left upper lobe adjacent to the oblique  fissure, best appreciated on maximum intensity projection image 37  through 41, are essentially unchanged from prior exam. Stable 7 mm  left upper lobe solid nodule (series 4 image 98).    Mediastinum: Unremarkable thyroid. Heart size is within normal limits.  No pericardial effusion. The ascending thoracic aorta and main  pulmonary artery are not dilated. Bovine aortic arch. Coronary artery  calcifications. Calcifications of the aortic arch and at the origins  of the great vessels. Stable calcifications within the mediastinum and  hilar regions. No enlarging lymphadenopathy. Esophagus is within  normal limits.    Liver: No mass. No intrahepatic biliary ductal dilation.     Biliary System: Normal gallbladder. No extrahepatic biliary ductal  dilation.    Pancreas: No mass or pancreatic ductal dilation.     Adrenal glands: No adrenal nodules.     Spleen: Normal.     Kidneys: No suspicious mass,  obstructing calculus or hydronephrosis.  Calcifications in the kidneys appear vascular in nature.    Gastrointestinal tract: Normal appendix. Normal caliber small bowel.  Colonic diverticula without diverticulitis.     Mesentery/peritoneum/retroperitoneum: No mass. No free fluid or air.      Lymph nodes: Prominent but nonenlarged bilateral inguinal lymph nodes.  Prominent left pelvic node measuring up to 8 mm (series 3 image 563).    Vasculature: Moderate aortoiliac atherosclerotic calcifications.  Calcification at the origin of the celiac trunk. Major vessels  including the portal vein, splenic vein, and superior mesenteric vein  are patent.      Pelvis: Decompressed bladder is grossly normal. Pelvic phleboliths. No  adnexal mass or free pelvic fluid.        Osseous structures: Osteopenia. Degenerative changes in the spine.  L4-5 anterolisthesis. Stable lytic lesion of the L2 superior endplate.      Soft tissues: No concerning soft tissue lesions. Stable small  subcutaneous/cutaneous nodule overlying the left lateral aspect of the  sternum, measuring up to 6 mm, unchanged since 2020 and likely  representing a sebaceous/epidermal inclusion cyst.  Impression: IMPRESSION:   1. Increased reticulonodular and consolidative opacities in the  posterior right upper lobe, including a new region of inferomedial  extension, concerning for worsening malignancy. The differential  includes radiation fibrosis with new superimposed infection.  2. Decreased size of the previously seen right apical nodule. The  remainder of assessed pulmonary nodules are essentially stable from  prior imaging.  3. Stable lytic lesion of the L2 superior endplate, previously  biopsied with benign results and possibly representing a large  Schmorl's node. Attention on follow-up.    I have personally reviewed the examination and initial interpretation  and I agree with the findings.    RICARDA EUBANKS MD         SYSTEM ID:  [id number]     CT CAP 9/30/24  was personally reviewed and interpreted by me. Agree there's more consolidation in the posterior lower part of the RUL.                Again, thank you for allowing me to participate in the care of your patient.        Sincerely,        Zarina Leung MD

## 2024-10-02 NOTE — NURSING NOTE
"Oncology Rooming Note    October 2, 2024 1:03 PM   Jenn Aparicio is a 69 year old female who presents for:    Chief Complaint   Patient presents with    Oncology Clinic Visit     RTN for Lung Cancer     Initial Vitals: /77 (BP Location: Right arm, Patient Position: Right side, Cuff Size: Adult Regular)   Pulse 95   Temp 98.3  F (36.8  C) (Oral)   Resp 18   Wt 84.8 kg (187 lb)   SpO2 97%   BMI 30.18 kg/m   Estimated body mass index is 30.18 kg/m  as calculated from the following:    Height as of 9/16/24: 1.676 m (5' 6\").    Weight as of this encounter: 84.8 kg (187 lb). Body surface area is 1.99 meters squared.  No Pain (0) Comment: Data Unavailable   No LMP recorded. Patient is postmenopausal.  Allergies reviewed: Yes  Medications reviewed: Yes    Medications: Medication refills not needed today.  Pharmacy name entered into EPIC:    [facility] - [place], [place] - [address]  [facility] - [place] - [address] [place] [address] [id number] - [place], [place] - [address] [facility] - [place], [place] - [phone]  [id number] - [place] [address] AT [facility] OF  [address]    Frailty Screening:   Is the patient here for a new oncology consult visit in cancer care? 2. No      Clinical concerns: none       Elisabeth Collins MA             "

## 2024-10-04 DIAGNOSIS — H40.033 ANATOMICAL NARROW ANGLE OF BOTH EYES: Primary | ICD-10-CM

## 2024-10-07 ENCOUNTER — TELEPHONE (OUTPATIENT)
Dept: ENDOCRINOLOGY | Facility: CLINIC | Age: 69
End: 2024-10-07
Payer: COMMERCIAL

## 2024-10-07 NOTE — TELEPHONE ENCOUNTER
Left Voicemail (1st Attempt) and Sent ViOptixhart (1st Attempt) for the patient to call back and schedule the following:    Appointment type: return diabetes  Provider: any that see Type 2 diabetes at the [facility]  Return date: Jan 2025  Specialty phone number: [phone]  Additional appointment(s) needed:   Additonal Notes:   LVADAM, La Nena Nieto MD  [id number]    Clinic coordinators,    Would you please help patient schedule the following appointments: she cannot come to [place] for visits and needs in-person visits at the [facility] for CGM download.  -ANN visit in 3 months with PA at the [facility] for follow-up on diabetes  -MD visit at the [facility] in 6 months for transfer of care (needs in person visits)    Please let me know if questions come up.    Felisha    3 month follow up with PA is already scheduled. Just needs return appointment with a new endocrinologist that sees patients at the [facility].    Marga Hayes on 10/7/2024 at 12:14 PM

## 2024-10-08 ENCOUNTER — OFFICE VISIT (OUTPATIENT)
Dept: OPHTHALMOLOGY | Facility: CLINIC | Age: 69
End: 2024-10-08
Attending: OPHTHALMOLOGY
Payer: COMMERCIAL

## 2024-10-08 DIAGNOSIS — H26.493 PCO (POSTERIOR CAPSULAR OPACIFICATION), BILATERAL: ICD-10-CM

## 2024-10-08 DIAGNOSIS — H04.123 DRY EYE SYNDROME OF BOTH EYES: ICD-10-CM

## 2024-10-08 DIAGNOSIS — Z96.1 PSEUDOPHAKIA, BOTH EYES: Primary | ICD-10-CM

## 2024-10-08 PROCEDURE — G0463 HOSPITAL OUTPT CLINIC VISIT: HCPCS | Performed by: OPHTHALMOLOGY

## 2024-10-08 PROCEDURE — 76514 ECHO EXAM OF EYE THICKNESS: CPT | Performed by: OPHTHALMOLOGY

## 2024-10-08 PROCEDURE — 92133 CPTRZD OPH DX IMG PST SGM ON: CPT | Performed by: OPHTHALMOLOGY

## 2024-10-08 PROCEDURE — 92083 EXTENDED VISUAL FIELD XM: CPT | Performed by: OPHTHALMOLOGY

## 2024-10-08 PROCEDURE — 99214 OFFICE O/P EST MOD 30 MIN: CPT | Mod: GC | Performed by: OPHTHALMOLOGY

## 2024-10-08 RX ORDER — CYCLOSPORINE 0.5 MG/ML
1 EMULSION OPHTHALMIC 2 TIMES DAILY
Qty: 60 EACH | Refills: 11 | Status: SHIPPED | OUTPATIENT
Start: 2024-10-08

## 2024-10-08 ASSESSMENT — VISUAL ACUITY
METHOD: SNELLEN - LINEAR
OD_CC: 20/30
OS_CC: 20/30
OD_PH_CC: 20/25

## 2024-10-08 ASSESSMENT — GONIOSCOPY
OS_NASAL: NO STRUCTURES
OS_INFERIOR: SS
OD_NASAL: NO STRUCTURES
OS_TEMPORAL: PTM
OD_TEMPORAL: NO STRUCTURES
OS_SUPERIOR: PTM

## 2024-10-08 ASSESSMENT — REFRACTION_WEARINGRX
OS_SPHERE: +2.50
OD_CYLINDER: +1.25
OD_AXIS: 017
OD_CYLINDER: +1.25
OS_AXIS: 160
OS_AXIS: 160
OS_CYLINDER: +1.25
OD_CYLINDER: +1.25
OS_AXIS: 160
OD_AXIS: 017
OS_SPHERE: PLANO
OD_AXIS: 017
OS_ADD: +2.50
OD_SPHERE: -0.50
SPECS_TYPE: SVL/BF/PAL
OD_AXIS: 017
OD_SPHERE: +2.00
OS_AXIS: 160
OS_CYLINDER: +1.25
OS_CYLINDER: +1.25
OD_ADD: +2.50
OD_SPHERE: -0.50
OS_CYLINDER: +1.25
OD_ADD: +2.50
OD_CYLINDER: +1.25
OS_ADD: +2.50
OD_SPHERE: +2.00
OS_SPHERE: +2.50
SPECS_TYPE: SVL/BF/PAL
OS_SPHERE: PLANO

## 2024-10-08 ASSESSMENT — TONOMETRY
IOP_METHOD: APPLANATION
IOP_METHOD: TONOPEN
OS_IOP_MMHG: 12
OD_IOP_MMHG: 14
OS_IOP_MMHG: 16
OD_IOP_MMHG: 14

## 2024-10-08 ASSESSMENT — CONF VISUAL FIELD
OD_INFERIOR_TEMPORAL_RESTRICTION: 0
OS_NORMAL: 1
OS_SUPERIOR_NASAL_RESTRICTION: 0
METHOD: COUNTING FINGERS
OS_INFERIOR_NASAL_RESTRICTION: 0
OS_INFERIOR_TEMPORAL_RESTRICTION: 0
OS_SUPERIOR_TEMPORAL_RESTRICTION: 0
OD_SUPERIOR_NASAL_RESTRICTION: 0
OD_INFERIOR_NASAL_RESTRICTION: 0
OD_SUPERIOR_TEMPORAL_RESTRICTION: 0
OD_NORMAL: 1

## 2024-10-08 ASSESSMENT — CUP TO DISC RATIO
OS_RATIO: 0.4
OD_RATIO: 0.45

## 2024-10-08 ASSESSMENT — PACHYMETRY
OS_CT(UM): 569
OD_CT(UM): 601

## 2024-10-08 ASSESSMENT — SLIT LAMP EXAM - LIDS
COMMENTS: WNL
COMMENTS: WNL

## 2024-10-08 ASSESSMENT — EXTERNAL EXAM - LEFT EYE: OS_EXAM: NORMAL

## 2024-10-08 ASSESSMENT — EXTERNAL EXAM - RIGHT EYE: OD_EXAM: NORMAL

## 2024-10-08 NOTE — PROGRESS NOTES
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     Relevant Past Medical/Family/Social History:    T2DM, lung CA s/p partial resection, history smoking    Relevant Review of Systems: Negative except as noted in HPI     Diagnosis: Glaucoma suspect each eye   Onset / Date of Diagnosis:   Previous glaucoma surgery/laser:    - OD: CE / IOL (Neelaidi, 2023)   - OS: CE / IOL (Neelaidi, 2023)  Maximum intraocular pressure 21 / 24  Current Meds: none   Family history: positive; father    / 569  Gonio: open each eye   Refractive status: myope  Trauma history: negative  Steroid exposure: positive; COPD  Vasospastic disease (e.g. Migraines / Raynaud's phenomenon): positive  Hx of hemodynamic crisis or Low BP: negative  Meds AEs/intolerance:  PMHx: positive for COPD; no Cardiac/Renal/Kidney stones/Sulfa Allergy  Anticoagulants: Eliquis 5 mg qd  Today's testing:  Visual field October 8, 2024:   - OD: generalized depression with scattered defects and relative central sparing; MD -8.0; reliable  - OS: generalized depression with scattered defects and relative central sparing; MD -9.5; reliable   OCT RNFL October 8, 2024  - OD: no thinning; G 109  - OS: no thinning; G 113    Additional Ocular History:     # Pseudophakia each eye   PCO each eye     # Dry Eye Syndrome each eye     # Myopia each eye     Plan/Recommendations:  Discussed findings with patient.  PCO each eye, likely visually significant, recommend YAG capsulotomy right eye first   Risk and benefit of YAG capsulotomy discussed with the patient, wishes to proceed   Increase AT's at least 4-6 times daily   Start Restasis BID each eye     YAG capsulotomy right eye first    Howard Zavala MD  PGY-3, Ophthalmology  [facility]    Physician Attestation     Attending Physician Attestation:   Complete documentation of historical and exam elements from today's encounter can be found in the full encounter summary report (not reduplicated in this progress note). I personally obtained the chief complaint(s) and history of present illness. I confirmed and edited as necessary the review of systems, past medical/surgical history, family history, social history, and examination findings as documented by others; and I examined the patient myself. I personally reviewed the relevant tests, images, and reports as documented above. I personally reviewed the ophthalmic test(s) associated with this encounter, agree with the interpretation(s) as documented by the resident/fellow and have edited the corresponding report(s) as necessary. I formulated and edited as necessary the assessment and plan and discussed the findings and management plan with the patient and any family members present at the time of the visit.  Neda Covarrubias M.D., Glaucoma, October 8, 2024

## 2024-10-08 NOTE — PATIENT INSTRUCTIONS
Start artificial tears 4 to 6 times daily in both eyes     Start Restasis twice daily in both eyes

## 2024-10-08 NOTE — NURSING NOTE
Chief Complaints and History of Present Illnesses   Patient presents with    Glaucoma Evaluation     Chief Complaint(s) and History of Present Illness(es)       Glaucoma Evaluation              Laterality: both eyes    Associated symptoms: dryness, floaters and itching.  Negative for eye pain, tearing, flashes and burning    Pain scale: 0/10              Comments    Jenn is here new to clinic, and referred by Dr Keita for evaluation of glaucoma. She feels vision is about the same as last visit or better due to having her cataracts removed.    Saul Regalado COT 1:10 PM October 8, 2024

## 2024-10-09 NOTE — PROGRESS NOTES
Neurosurgery Clinic Note    Chief Complaint: hospital follow-up     DATE OF VISIT: 10/10/2024    HPI: Jenn Aparicio is a 69 year old female with PMH notable for O2 dependent COPD (2L O2 @ rest and 3L with exertion), Lung adenocarcinoma s/p RLL lobectomy (2/2016), SBRT to RUL nodule (1/2020) DMII c/b peripheral neuropathy, HTN, and HLD. Initially admitted 8/14/24 to medicine with L ankle pain & swelling accompanied by failure to thrive. Initially planned for discharge to TCU AM of 8/17, but instead transferred to ICU in the early hours of 8/17/24 for treatment of A fib with RVR. History of chronic low back and left leg pain. Patient reiterates that her back pain has worsened, with radiation down her left lower extremity with weakness and that contributed to her ankle twisting. Also reports she fell over her oxygen tubing and her tank fell on her ankle.  MRI imaging from 6/26/2024 initially concerning of metastatic lesion at L2, underwent IR biopsy on 7/22/2024 which was negative for malignancy.  Repeat MRI was obtained 8/15/2024 and revealed multilevel degenerative changes and anterolisthesis at L4-5, no acute intervention was pursued and patient was asked to follow-up in clinic.   Patient was started on eliquis during hospitalization given development of A.fib with RVR.  Patient was discharged on 8/26/2024.       Since last May the patient states she has experienced low back pain near the belt line. She does not remember any trauma or injury that preceeded the onset.  She did undergo a biopsy of L2 on 7/22/2024 and states the pain worsened following the procedure.   States walking, standing, transitioning from sitting to standing, and raising her legs all exacerbate the pain.  States sitting down and heat help with the pain.  States leaning forward does help with the pain.  Currently taking Lyrica 150 mg BID, muscle relaxers, and tylenol for pain.  No history of surgery to her lumbar spine.  States she had  "injections in her low back in the past, many years ago, without benefit.  Currently enrolled in PT and states this has not been helpful.  States she can not walk a full block without sitting down, she is utilizing a walker when ambulating.  Patient states her legs are weak, she states when she is walking her lower legs get \"wobbly.\"   She describes pain in the bilateral lower extremities in the anterior aspect with radiation to the medial aspects of her feet.  Denies changes in bowel or bladder function.   Patient denies tobacco use.  Continues to take eliquis as prescribed given her history of Atrial Fibrillation.          Review of Systems   Negative except in HPI    Past Medical History:   Diagnosis Date    Adenocarcinoma, lung (H)     Asthma     Chronic left-sided low back pain with left-sided sciatica 08/16/2024    Chronic respiratory failure with hypoxia (H) 08/16/2024    Ectopic pregnancy     Esophageal reflux     Pulmonary emphysema (H)     Very severe FEV1<30% predicted    Type II diabetes mellitus (H)        Past Surgical History:   Procedure Laterality Date    2/8/16                R thoracotomy, RLL lobectomy (Dr. Cunha). Adenocarcinoma, 1.1 cm, assoicated with atypical adenomatous hyperplasia Right 02/08/2016    R thoracotomy, RLL lobectomy (Dr. Cunha). Adenocarcinoma, 1.1 cm, assoicated with atypical adenomatous hyperplasia    BRONCHOSCOPY, WITH BIOPSY, ROBOT ASSISTED N/A 07/19/2023    Procedure: robot assisted Ion BRONCHOSCOPY, WITH BIOPSY;  Surgeon: Patria Garcia MD;  Location: [facility]    CATARACT IOL, RT/LT      ENDOBRONCHIAL ULTRASOUND FLEXIBLE N/A 07/19/2023    Procedure: Endobronchial ultrasound flexible;  Surgeon: Patria Garcia MD;  [facility]    ESOPHAGOSCOPY, GASTROSCOPY, DUODENOSCOPY (EGD), COMBINED N/A 08/16/2022    Procedure: ESOPHAGOGASTRODUODENOSCOPY (EGD);  Surgeon: Travis Briseno MD;  Location:  GI    ESOPHAGOSCOPY, GASTROSCOPY, DUODENOSCOPY (EGD), COMBINED N/A 08/08/2023 "    Procedure: ESOPHAGOGASTRODUODENOSCOPY, WITH BIOPSY;  Surgeon: Chao Rodrigues DO;  Location:  GI    ORTHOPEDIC SURGERY      PHACOEMULSIFICATION CLEAR CORNEA WITH STANDARD INTRAOCULAR LENS IMPLANT Left 2023    Procedure: LEFT EYE PHACOEMULSIFICATION, CATARACT, WITH INTRAOCULAR LENS IMPLANT;  Surgeon: Re Keita MD;  Location: [facility] OR    PHACOEMULSIFICATION CLEAR CORNEA WITH STANDARD INTRAOCULAR LENS IMPLANT Right 2023    Procedure: RIGHT EYE PHACOEMULSIFICATION, CATARACT, WITH INTRAOCULAR LENS IMPLANT;  Surgeon: Re Keita MD;  Location: [facility] OR       Social History     Socioeconomic History    Marital status: Single   Tobacco Use    Smoking status: Former     Current packs/day: 0.00     Types: Cigarettes     Quit date:      Years since quittin.7     Passive exposure: Past    Smokeless tobacco: Never    Tobacco comments:     2023 Patient using 14 mg patch, wants to have prescription for Nicotrol inhaler, took workbook   Vaping Use    Vaping status: Never Used   Substance and Sexual Activity    Alcohol use: Not Currently    Drug use: No    Sexual activity: Not Currently     Partners: Male   Social History Narrative    Ms. Aparicio lives in an apartment complex by herself as of May 2022. She has frequent smoke exposure from neighbors even when she is not smoking herself.      Social Determinants of Health     Financial Resource Strain: Low Risk  (2024)    Financial Resource Strain     Within the past 12 months, have you or your family members you live with been unable to get utilities (heat, electricity) when it was really needed?: No   Food Insecurity: Low Risk  (2024)    Food Insecurity     Within the past 12 months, did you worry that your food would run out before you got money to buy more?: No     Within the past 12 months, did the food you bought just not last and you didn t have money to get more?: No   Transportation Needs: Low Risk  (2024)    Transportation  Needs     Within the past 12 months, has lack of transportation kept you from medical appointments, getting your medicines, non-medical meetings or appointments, work, or from getting things that you need?: No   Interpersonal Safety: Low Risk  (10/8/2024)    Interpersonal Safety     Do you feel physically and emotionally safe where you currently live?: Yes     Within the past 12 months, have you been hit, slapped, kicked or otherwise physically hurt by someone?: No     Within the past 12 months, have you been humiliated or emotionally abused in other ways by your partner or ex-partner?: No   Housing Stability: Low Risk  (8/21/2024)    Housing Stability     Do you have housing? : Yes     Are you worried about losing your housing?: No       family history includes Alcohol/Drug in an other family member; Asthma in an other family member; Cancer in her brother, brother, and sister; Cerebrovascular Disease in her mother; Deep Vein Thrombosis (DVT) in her daughter; Depression in her daughter; Diabetes in her brother, brother, brother and another family member; Glaucoma in her father; Hypertension in her father and mother; Lung Cancer in her sister; Thyroid Disease in her mother and another family member.              Physical Exam   There were no vitals taken for this visit.  Constitutional: Oriented to person, place, and time. Appears well-developed and well-nourished. Cooperative. No distress.   HENT: Head normocephalic and atraumatic.   Neurological: alert and oriented to person, place, and time. CN II-XII grossly  intact      STRENGTH LEFT RIGHT   Deltoid 5 5   Bicep 5 5   Wrist Extensor 5 5   Tricep 5 5   Finger flexion 5 5   Finger abduction 5 5    5 5       Hip Flexion     5     4+*   Knee Extension 5 5   Ankle Dorsiflexion 5 5   Extensor Hallucis Longus 5 5   Plantar Flexion 5 5   Foot eversion 5 5   Foot inversion 5 5   *pain limited     Skin: Skin is warm, dry and intact.   Psychiatric: Normal mood and  affect. Speech is normal and behavior is normal.      IMAGING:  Personally reviewed lumbar MRI from 8/15/2024  1. Stable to decreased conspicuity of previously biopsy-proven mildly  inflamed Schmorl node in the superior endplate of L2 endplates. No  evidence of metastatic disease in the lumbar spine. No leptomeningeal  enhancement.  2. Multilevel degenerative findings as detailed above. Most  significantly there is grade 1 anterolisthesis, degenerative disc  disease and facet arthropathy at L4-5 resulting in moderate spinal  canal stenosis and moderate right neural foraminal stenosis. The  anterior epidural venous plexus is prominent at this level, likely due  to anterolisthesis and spinal canal narrowing.    ASSESSMENT:  Jenn Aparicio is a 69 year old female with PMH notable for O2 dependent COPD (2L O2 @ rest and 3L with exertion), Lung adenocarcinoma s/p RLL lobectomy (2/2016), SBRT to RUL nodule (1/2020) DMII c/b peripheral neuropathy, HTN, and chronic low back and bilateral leg pain with evidence of multilevel central canal and foraminal narrowing     PLAN:  Given ongoing low back and leg pain and evidence of bilateral foraminal narrowing at L4, will recommend L4 intralaminar epidural steroid injection.  Will place referral for the procedure.   Will need approval from PCP to stop eliquis prior to procedure.     Recommend continuing with PT as scheduled.     If you should note increased pain or develop weakness in the lower extremities or bowel or bladder changes, please seek urgent medical attention.     Patient to follow-up with me 2 weeks after injection for reassessment.  If patient is not experiencing relief, will refer to my surgical colleagues for consideration of surgery.   Given patient's multiple medical co morbidities, I do feel conservative management is best if possible.                I spent 35 minutes in patient care with greater than 50% spent in counseling and/or coordination of care.     I  performed independent visualization of radiographic imaging and entered my own interpretation, reviewed and/or ordered tests in radiology        BENOIT Mccarthy, CNP  Department of Neurosurgery  Pager: [phone]

## 2024-10-09 NOTE — TELEPHONE ENCOUNTER
Patient confirmed scheduled appointment:  Date: 01/02  Time: 9:15  Visit type: return diabetes  Provider: Chevy  Location: [facility]  Testing/imaging:   Additional notes: Mailed patient list of future appointments per patient request     La Nena Shabazz MD  [id number]    Clinic coordinators,    Would you please help patient schedule the following appointments: she cannot come to [place] for visits and needs in-person visits at the [facility] for CGM download.  -ANN visit in 3 months with PA at the [facility] for follow-up on diabetes  -MD visit at the [facility] in 6 months for transfer of care (needs in person visits)    Please let me know if questions come up.    Felisha Hayes on 10/9/2024 at 4:18 PM

## 2024-10-10 ENCOUNTER — OFFICE VISIT (OUTPATIENT)
Dept: NEUROSURGERY | Facility: CLINIC | Age: 69
End: 2024-10-10
Payer: COMMERCIAL

## 2024-10-10 VITALS
RESPIRATION RATE: 16 BRPM | SYSTOLIC BLOOD PRESSURE: 145 MMHG | DIASTOLIC BLOOD PRESSURE: 76 MMHG | HEART RATE: 117 BPM | OXYGEN SATURATION: 90 %

## 2024-10-10 DIAGNOSIS — G89.29 CHRONIC LEFT-SIDED LOW BACK PAIN WITH LEFT-SIDED SCIATICA: Chronic | ICD-10-CM

## 2024-10-10 DIAGNOSIS — M54.42 CHRONIC LEFT-SIDED LOW BACK PAIN WITH LEFT-SIDED SCIATICA: Chronic | ICD-10-CM

## 2024-10-10 PROCEDURE — 99214 OFFICE O/P EST MOD 30 MIN: CPT | Performed by: NURSE PRACTITIONER

## 2024-10-10 ASSESSMENT — PAIN SCALES - GENERAL: PAINLEVEL: WORST PAIN (10)

## 2024-10-10 NOTE — PATIENT INSTRUCTIONS
Given ongoing low back and leg pain and evidence of bilateral foraminal narrowing at L4, will recommend L4 intralaminar epidural steroid injection.  Will place referral for the procedure.   Will need approval from PCP to stop eliquis prior to procedure.     Recommend continuing with PT as scheduled.     If you should note increased pain or develop weakness in the lower extremities or bowel or bladder changes, please seek urgent medical attention.     Patient to follow-up with me 2 weeks after injection for reassessment.  If patient is not experiencing relief, will refer to my surgical colleagues for consideration of surgery.

## 2024-10-10 NOTE — LETTER
10/10/2024       RE: Jenn Aparicio  [address]  [place] [address]     Dear Colleague,    Thank you for referring your patient, Jenn Aparicio, to the [facility] [place] [facility]. Please see a copy of my visit note below.        Neurosurgery Clinic Note    Chief Complaint: hospital follow-up     DATE OF VISIT: 10/10/2024    HPI: Jenn Aparicio is a 69 year old female with PMH notable for O2 dependent COPD (2L O2 @ rest and 3L with exertion), Lung adenocarcinoma s/p RLL lobectomy (2/2016), SBRT to RUL nodule (1/2020) DMII c/b peripheral neuropathy, HTN, and HLD. Initially admitted 8/14/24 to medicine with L ankle pain & swelling accompanied by failure to thrive. Initially planned for discharge to TCU AM of 8/17, but instead transferred to ICU in the early hours of 8/17/24 for treatment of A fib with RVR. History of chronic low back and left leg pain. Patient reiterates that her back pain has worsened, with radiation down her left lower extremity with weakness and that contributed to her ankle twisting. Also reports she fell over her oxygen tubing and her tank fell on her ankle.  MRI imaging from 6/26/2024 initially concerning of metastatic lesion at L2, underwent IR biopsy on 7/22/2024 which was negative for malignancy.  Repeat MRI was obtained 8/15/2024 and revealed multilevel degenerative changes and anterolisthesis at L4-5, no acute intervention was pursued and patient was asked to follow-up in clinic.   Patient was started on eliquis during hospitalization given development of A.fib with RVR.  Patient was discharged on 8/26/2024.       Since last May the patient states she has experienced low back pain near the belt line. She does not remember any trauma or injury that preceeded the onset.  She did undergo a biopsy of L2 on 7/22/2024 and states the pain worsened following the procedure.   States walking, standing,  "transitioning from sitting to standing, and raising her legs all exacerbate the pain.  States sitting down and heat help with the pain.  States leaning forward does help with the pain.  Currently taking Lyrica 150 mg BID, muscle relaxers, and tylenol for pain.  No history of surgery to her lumbar spine.  States she had injections in her low back in the past, many years ago, without benefit.  Currently enrolled in PT and states this has not been helpful.  States she can not walk a full block without sitting down, she is utilizing a walker when ambulating.  Patient states her legs are weak, she states when she is walking her lower legs get \"wobbly.\"   She describes pain in the bilateral lower extremities in the anterior aspect with radiation to the medial aspects of her feet.  Denies changes in bowel or bladder function.   Patient denies tobacco use.  Continues to take eliquis as prescribed given her history of Atrial Fibrillation.          Review of Systems   Negative except in HPI    Past Medical History:   Diagnosis Date     Adenocarcinoma, lung (H)      Asthma      Chronic left-sided low back pain with left-sided sciatica 08/16/2024     Chronic respiratory failure with hypoxia (H) 08/16/2024     Ectopic pregnancy      Esophageal reflux      Pulmonary emphysema (H)     Very severe FEV1<30% predicted     Type II diabetes mellitus (H)        Past Surgical History:   Procedure Laterality Date     2/8/16                R thoracotomy, RLL lobectomy (Dr. Cunha). Adenocarcinoma, 1.1 cm, assoicated with atypical adenomatous hyperplasia Right 02/08/2016    R thoracotomy, RLL lobectomy (Dr. Cunha). Adenocarcinoma, 1.1 cm, assoicated with atypical adenomatous hyperplasia     BRONCHOSCOPY, WITH BIOPSY, ROBOT ASSISTED N/A 07/19/2023    Procedure: robot assisted Ion BRONCHOSCOPY, WITH BIOPSY;  Surgeon: Patria Garcia MD;  Location: [facility] OR     CATARACT IOL, RT/LT       ENDOBRONCHIAL ULTRASOUND FLEXIBLE N/A 07/19/2023    " Procedure: Endobronchial ultrasound flexible;  Surgeon: Patria Garcia MD;  Location:  OR     ESOPHAGOSCOPY, GASTROSCOPY, DUODENOSCOPY (EGD), COMBINED N/A 2022    Procedure: ESOPHAGOGASTRODUODENOSCOPY (EGD);  Surgeon: Travis Briseno MD;  Location: [facility]     ESOPHAGOSCOPY, GASTROSCOPY, DUODENOSCOPY (EGD), COMBINED N/A 2023    Procedure: ESOPHAGOGASTRODUODENOSCOPY, WITH BIOPSY;  Surgeon: Chao Rodrigues DO;  Location:  GI     ORTHOPEDIC SURGERY       PHACOEMULSIFICATION CLEAR CORNEA WITH STANDARD INTRAOCULAR LENS IMPLANT Left 2023    Procedure: LEFT EYE PHACOEMULSIFICATION, CATARACT, WITH INTRAOCULAR LENS IMPLANT;  Surgeon: Re Keita MD;  Location: [facility] [place]     PHACOEMULSIFICATION CLEAR CORNEA WITH STANDARD INTRAOCULAR LENS IMPLANT Right 2023    Procedure: RIGHT EYE PHACOEMULSIFICATION, CATARACT, WITH INTRAOCULAR LENS IMPLANT;  Surgeon: Re Keita MD;  Location: [facility] [place]       Social History     Socioeconomic History     Marital status: Single   Tobacco Use     Smoking status: Former     Current packs/day: 0.00     Types: Cigarettes     Quit date:      Years since quittin.7     Passive exposure: Past     Smokeless tobacco: Never     Tobacco comments:     2023 Patient using 14 mg patch, wants to have prescription for Nicotrol inhaler, took workbook   Vaping Use     Vaping status: Never Used   Substance and Sexual Activity     Alcohol use: Not Currently     Drug use: No     Sexual activity: Not Currently     Partners: Male   Social History Narrative    Ms. Aparicio lives in an apartment complex by herself as of May 2022. She has frequent smoke exposure from neighbors even when she is not smoking herself.      Social Determinants of Health     Financial Resource Strain: Low Risk  (2024)    Financial Resource Strain      Within the past 12 months, have you or your family members you live with been unable to get utilities (heat, electricity) when it was really needed?:  No   Food Insecurity: Low Risk  (8/21/2024)    Food Insecurity      Within the past 12 months, did you worry that your food would run out before you got money to buy more?: No      Within the past 12 months, did the food you bought just not last and you didn t have money to get more?: No   Transportation Needs: Low Risk  (8/21/2024)    Transportation Needs      Within the past 12 months, has lack of transportation kept you from medical appointments, getting your medicines, non-medical meetings or appointments, work, or from getting things that you need?: No   Interpersonal Safety: Low Risk  (10/8/2024)    Interpersonal Safety      Do you feel physically and emotionally safe where you currently live?: Yes      Within the past 12 months, have you been hit, slapped, kicked or otherwise physically hurt by someone?: No      Within the past 12 months, have you been humiliated or emotionally abused in other ways by your partner or ex-partner?: No   Housing Stability: Low Risk  (8/21/2024)    Housing Stability      Do you have housing? : Yes      Are you worried about losing your housing?: No       family history includes Alcohol/Drug in an other family member; Asthma in an other family member; Cancer in her brother, brother, and sister; Cerebrovascular Disease in her mother; Deep Vein Thrombosis (DVT) in her daughter; Depression in her daughter; Diabetes in her brother, brother, brother and another family member; Glaucoma in her father; Hypertension in her father and mother; Lung Cancer in her sister; Thyroid Disease in her mother and another family member.              Physical Exam   There were no vitals taken for this visit.  Constitutional: Oriented to person, place, and time. Appears well-developed and well-nourished. Cooperative. No distress.   HENT: Head normocephalic and atraumatic.   Neurological: alert and oriented to person, place, and time. CN II-XII grossly  intact      STRENGTH LEFT RIGHT   Deltoid 5 5    Bicep 5 5   Wrist Extensor 5 5   Tricep 5 5   Finger flexion 5 5   Finger abduction 5 5    5 5       Hip Flexion     5     4+*   Knee Extension 5 5   Ankle Dorsiflexion 5 5   Extensor Hallucis Longus 5 5   Plantar Flexion 5 5   Foot eversion 5 5   Foot inversion 5 5   *pain limited     Skin: Skin is warm, dry and intact.   Psychiatric: Normal mood and affect. Speech is normal and behavior is normal.      IMAGING:  Personally reviewed lumbar MRI from 8/15/2024  1. Stable to decreased conspicuity of previously biopsy-proven mildly  inflamed Schmorl node in the superior endplate of L2 endplates. No  evidence of metastatic disease in the lumbar spine. No leptomeningeal  enhancement.  2. Multilevel degenerative findings as detailed above. Most  significantly there is grade 1 anterolisthesis, degenerative disc  disease and facet arthropathy at L4-5 resulting in moderate spinal  canal stenosis and moderate right neural foraminal stenosis. The  anterior epidural venous plexus is prominent at this level, likely due  to anterolisthesis and spinal canal narrowing.    ASSESSMENT:  Jenn Aparicio is a 69 year old female with PMH notable for O2 dependent COPD (2L O2 @ rest and 3L with exertion), Lung adenocarcinoma s/p RLL lobectomy (2/2016), SBRT to RUL nodule (1/2020) DMII c/b peripheral neuropathy, HTN, and chronic low back and bilateral leg pain with evidence of multilevel central canal and foraminal narrowing     PLAN:  Given ongoing low back and leg pain and evidence of bilateral foraminal narrowing at L4, will recommend L4 intralaminar epidural steroid injection.  Will place referral for the procedure.   Will need approval from PCP to stop eliquis prior to procedure.     Recommend continuing with PT as scheduled.     If you should note increased pain or develop weakness in the lower extremities or bowel or bladder changes, please seek urgent medical attention.     Patient to follow-up with me 2 weeks after injection for  reassessment.  If patient is not experiencing relief, will refer to my surgical colleagues for consideration of surgery.   Given patient's multiple medical co morbidities, I do feel conservative management is best if possible.                I spent 35 minutes in patient care with greater than 50% spent in counseling and/or coordination of care.     I performed independent visualization of radiographic imaging and entered my own interpretation, reviewed and/or ordered tests in radiology        BENOIT Mccarthy, CNP  Department of Neurosurgery  Pager: [phone]      Again, thank you for allowing me to participate in the care of your patient.      Sincerely,    BENOIT Mantilla CNP

## 2024-10-10 NOTE — TELEPHONE ENCOUNTER
MTM referral from: [facility] visit (referral by provider)    MTM referral outreach attempt #1 on December 16, 2020 at 4:22 PM      Outcome: Patient is not interested at this time because of cost. Due to insurance patient falls under private pay, will route to MTM Pharmacist/Provider as an FYI. Thank you for the referral.     Duong Yanez, MTM coordinator    
Require Ndc Code?: No
Lot # For Kenalog (Optional): [id number]
Medical Necessity Clause: This procedure was medically necessary because the lesions that were treated were:
How Many Mls Were Removed From The 10 Mg/Ml (5ml) Vial When Preparing The Injectable Solution?: 0
Validate Note Data When Using Inventory: Yes
Ndc# For Kenalog Only: [address]-3
Treatment Number (Optional): 1
Detail Level: Detailed
Administered By (Optional): Dr Gilbert
Concentration Of Kenalog Solution Injected (Mg/Ml): 2.0
Expiration Date For Kenalog (Optional): 10/2925
Total Volume (Ccs): .3
Kenalog Type Of Vial: Single Dose
Kenalog Preparation: Kenalog
Consent: The risks of atrophy were reviewed with the patient.

## 2024-10-11 ENCOUNTER — HOSPITAL ENCOUNTER (EMERGENCY)
Facility: CLINIC | Age: 69
Discharge: HOME | End: 2024-10-11
Attending: FAMILY MEDICINE | Admitting: FAMILY MEDICINE
Payer: COMMERCIAL

## 2024-10-11 ENCOUNTER — APPOINTMENT (OUTPATIENT)
Dept: CT IMAGING | Facility: CLINIC | Age: 69
End: 2024-10-11
Attending: PHYSICIAN ASSISTANT
Payer: COMMERCIAL

## 2024-10-11 VITALS
OXYGEN SATURATION: 96 % | RESPIRATION RATE: 20 BRPM | TEMPERATURE: 98.4 F | SYSTOLIC BLOOD PRESSURE: 178 MMHG | DIASTOLIC BLOOD PRESSURE: 77 MMHG | HEART RATE: 99 BPM

## 2024-10-11 DIAGNOSIS — M54.41 ACUTE MIDLINE LOW BACK PAIN WITH BILATERAL SCIATICA: ICD-10-CM

## 2024-10-11 DIAGNOSIS — M54.42 ACUTE MIDLINE LOW BACK PAIN WITH BILATERAL SCIATICA: ICD-10-CM

## 2024-10-11 LAB
ALBUMIN UR-MCNC: 20 MG/DL
APPEARANCE UR: CLEAR
BILIRUB UR QL STRIP: NEGATIVE
COLOR UR AUTO: ABNORMAL
GLUCOSE UR STRIP-MCNC: >=1000 MG/DL
HGB UR QL STRIP: NEGATIVE
KETONES UR STRIP-MCNC: 10 MG/DL
LEUKOCYTE ESTERASE UR QL STRIP: NEGATIVE
MUCOUS THREADS #/AREA URNS LPF: PRESENT /LPF
NITRATE UR QL: NEGATIVE
PH UR STRIP: 5.5 [PH] (ref 5–7)
RBC URINE: 1 /HPF
SP GR UR STRIP: 1.03 (ref 1–1.03)
SQUAMOUS EPITHELIAL: 2 /HPF
UROBILINOGEN UR STRIP-MCNC: NORMAL MG/DL
WBC URINE: 1 /HPF

## 2024-10-11 PROCEDURE — 72131 CT LUMBAR SPINE W/O DYE: CPT | Mod: 26 | Performed by: RADIOLOGY

## 2024-10-11 PROCEDURE — 72131 CT LUMBAR SPINE W/O DYE: CPT

## 2024-10-11 PROCEDURE — 250N000011 HC RX IP 250 OP 636: Performed by: PHYSICIAN ASSISTANT

## 2024-10-11 PROCEDURE — 99284 EMERGENCY DEPT VISIT MOD MDM: CPT | Mod: 25 | Performed by: FAMILY MEDICINE

## 2024-10-11 PROCEDURE — 250N000013 HC RX MED GY IP 250 OP 250 PS 637: Performed by: PHYSICIAN ASSISTANT

## 2024-10-11 PROCEDURE — 99284 EMERGENCY DEPT VISIT MOD MDM: CPT | Mod: FS | Performed by: FAMILY MEDICINE

## 2024-10-11 PROCEDURE — 81001 URINALYSIS AUTO W/SCOPE: CPT | Performed by: PHYSICIAN ASSISTANT

## 2024-10-11 RX ORDER — ONDANSETRON 4 MG/1
4 TABLET, ORALLY DISINTEGRATING ORAL ONCE
Status: COMPLETED | OUTPATIENT
Start: 2024-10-11 | End: 2024-10-11

## 2024-10-11 RX ORDER — OXYCODONE HYDROCHLORIDE 5 MG/1
5 TABLET ORAL ONCE
Status: COMPLETED | OUTPATIENT
Start: 2024-10-11 | End: 2024-10-11

## 2024-10-11 RX ORDER — OXYCODONE HYDROCHLORIDE 5 MG/1
5 TABLET ORAL EVERY 6 HOURS PRN
Qty: 8 TABLET | Refills: 0 | Status: SHIPPED | OUTPATIENT
Start: 2024-10-11 | End: 2024-10-13

## 2024-10-11 RX ADMIN — OXYCODONE HYDROCHLORIDE 5 MG: 5 TABLET ORAL at 15:06

## 2024-10-11 RX ADMIN — ONDANSETRON 4 MG: 4 TABLET, ORALLY DISINTEGRATING ORAL at 16:33

## 2024-10-11 ASSESSMENT — ACTIVITIES OF DAILY LIVING (ADL)
ADLS_ACUITY_SCORE: 38

## 2024-10-11 ASSESSMENT — COLUMBIA-SUICIDE SEVERITY RATING SCALE - C-SSRS
2. HAVE YOU ACTUALLY HAD ANY THOUGHTS OF KILLING YOURSELF IN THE PAST MONTH?: NO
6. HAVE YOU EVER DONE ANYTHING, STARTED TO DO ANYTHING, OR PREPARED TO DO ANYTHING TO END YOUR LIFE?: NO
1. IN THE PAST MONTH, HAVE YOU WISHED YOU WERE DEAD OR WISHED YOU COULD GO TO SLEEP AND NOT WAKE UP?: NO

## 2024-10-11 NOTE — DISCHARGE INSTRUCTIONS
Here in the emergency room you have reassuring evaluation.  Your CT scan shows no visible fracture of your back or other bony cause to explain your back pain.  Your urine sample today shows no findings for infection.    We discussed observation stay versus trying to manage her pain at home when you feel comfortable managing her pain at home.  I did give you a short supply of oxycodone to be used as needed for breakthrough pain only.  I placed a referral to follow-up with pain management as they may have some additional recommendations.    Return back to the emergency room if you develop any new or worsening symptoms including severe pain fevers loss of control bladder or bowels, new numbness tingling weakness any other new or worsening symptoms.  We discussed the importance of Tylenol, ibuprofen, heat ice muscle relaxants as well as your prescribed Lyrica.

## 2024-10-11 NOTE — ED NOTES
[facility] ambulance called for medcab ride home d/t 2L NC baseline - ride set up. [facility] time estimate for pickup is 10:15pm.

## 2024-10-11 NOTE — PATIENT INSTRUCTIONS
"Recommendations from today's MTM visit:                                                    MTM (medication therapy management) is a service provided by a clinical pharmacist designed to help you get the most of out of your medicines.   Today we reviewed what your medicines are for, how to know if they are working, that your medicines are safe and how to make your medicine regimen as easy as possible.    Stop amlodipine and only take losartan 50 mg daily.  Okay to split tablet in half if you have side effects.  Getting labs done today  Change Trulicity to Mounjaro 2.5 mg once weekly - stop Mounjaro if it gives you similar side effects.   I sent you information via email on how to upload your blood sugars so that I can see them.    Follow-up: Return in about 29 days (around 4/5/2024) for Follow up, with me, using a phone visit.    It was great speaking with you today.  I value your experience and would be very thankful for your time in providing feedback in our clinic survey. In the next few days, you may receive an email or text message from [employer] with a link to a survey related to your  clinical pharmacist.\"     To schedule another MTM appointment, please call the clinic directly or you may call the MTM scheduling line at [phone] or toll-free at [phone].     My Clinical Pharmacist's contact information:                                                      Please feel free to contact me with any questions or concerns you have.      Samy Prasad, Pharm. D., [facility]  Medication Therapy Management Pharmacist    " Admission

## 2024-10-11 NOTE — ED TRIAGE NOTES
BIBA from home c/o back pain, unable to ambulate around apartment today d/t pain, unable to get up to eat or take medications  C/o sciatic pain that flares up  SBP 160s  Baseline on 2L NC, sating mid 90s    20g L AC  No recent falls or trauma

## 2024-10-11 NOTE — ED PROVIDER NOTES
ED Provider Note  [facility]      History     Chief Complaint   Patient presents with    Back Pain     HPI  Jenn Aparicio is a 69 year old female with a history of chronic left-sided low back pain with left-sided sciatica, O2 dependent COPD, lung adenocarcinoma s/p RLL lobectomy 2/2016, SBRT to RUL nodule 1/2020, DMII with peripheral neuropathy, HTN, and HLD who presents to the emergency department via EMS from home for inability to get up or walk due to back pain.    Per chart review patient was seen yesterday by neurosurgery in regards to chronic left-sided low back pain.  She was recommended outpatient steroid injection after discussing anticoagulation with her primary care provider.    Patient presents today with concerns for increasing back pain.  Pain is localized to the middle of her low back and does radiate into both legs.  She denies any new accident injury fall trauma in the interim since seeing neurosurgery yesterday.  She denies any loss of bladder or bowel function, saddle anesthesia, fevers, history of back surgeries.  She denies any associated chest pain dyspnea abdominal pain vomiting or any urinary symptoms.  She notes she has been taking muscle relaxants, Lyrica, Tylenol ibuprofen and oxycodone so far without much improvement of her symptoms.She does live alone although does not have stairs at home.  She also notes she is following with physical therapy last visit with them was about a week ago.    Past Medical History  Past Medical History:   Diagnosis Date    Adenocarcinoma, lung (H)     Asthma     Chronic left-sided low back pain with left-sided sciatica 08/16/2024    Chronic respiratory failure with hypoxia (H) 08/16/2024    Ectopic pregnancy     Esophageal reflux     Pulmonary emphysema (H)     Very severe FEV1<30% predicted    Type II diabetes mellitus (H)      Past Surgical History:   Procedure Laterality Date    2/8/16                R thoracotomy, RLL lobectomy  (Dr. Cunha). Adenocarcinoma, 1.1 cm, assoicated with atypical adenomatous hyperplasia Right 02/08/2016    R thoracotomy, RLL lobectomy (Dr. Cunha). Adenocarcinoma, 1.1 cm, assoicated with atypical adenomatous hyperplasia    BRONCHOSCOPY, WITH BIOPSY, ROBOT ASSISTED N/A 07/19/2023    Procedure: robot assisted Ion BRONCHOSCOPY, WITH BIOPSY;  Surgeon: Patria Garcia MD;  Location: [facility] OR    CATARACT IOL, RT/LT      ENDOBRONCHIAL ULTRASOUND FLEXIBLE N/A 07/19/2023    Procedure: Endobronchial ultrasound flexible;  Surgeon: Patria Garcia MD;  Location:  OR    ESOPHAGOSCOPY, GASTROSCOPY, DUODENOSCOPY (EGD), COMBINED N/A 08/16/2022    Procedure: ESOPHAGOGASTRODUODENOSCOPY (EGD);  Surgeon: Travis Briseno MD;  Location:  GI    ESOPHAGOSCOPY, GASTROSCOPY, DUODENOSCOPY (EGD), COMBINED N/A 08/08/2023    Procedure: ESOPHAGOGASTRODUODENOSCOPY, WITH BIOPSY;  Surgeon: Chao Rodrigues DO;  Location:  GI    ORTHOPEDIC SURGERY      PHACOEMULSIFICATION CLEAR CORNEA WITH STANDARD INTRAOCULAR LENS IMPLANT Left 08/24/2023    Procedure: LEFT EYE PHACOEMULSIFICATION, CATARACT, WITH INTRAOCULAR LENS IMPLANT;  Surgeon: Re Keita MD;  Location: [facility] OR    PHACOEMULSIFICATION CLEAR CORNEA WITH STANDARD INTRAOCULAR LENS IMPLANT Right 09/05/2023    Procedure: RIGHT EYE PHACOEMULSIFICATION, CATARACT, WITH INTRAOCULAR LENS IMPLANT;  Surgeon: Re Keita MD;  Location: [facility] OR     oxyCODONE (ROXICODONE) 5 MG tablet  acetylcysteine (MUCOMYST) 10 % nebulizer solution  albuterol (PROAIR HFA/PROVENTIL HFA/VENTOLIN HFA) 108 (90 Base) MCG/ACT inhaler  albuterol (PROVENTIL) (2.5 MG/3ML) 0.083% neb solution  alpha-lipoic acid 600 MG capsule  apixaban ANTICOAGULANT (ELIQUIS) 5 MG tablet  atorvastatin (LIPITOR) 10 MG tablet  carboxymethylcellulose (REFRESH LIQUIGEL) 1 % ophthalmic solution  cetirizine (ZYRTEC) 10 MG tablet  Continuous Blood Gluc  (DEXCOM G7 ) JOSEPHINE  Continuous Glucose Sensor (DEXCOM G7 SENSOR)  MISC  cyanocobalamin (VITAMIN B-12) 500 MCG tablet  cyclobenzaprine (FLEXERIL) 5 MG tablet  cycloSPORINE (RESTASIS) 0.05 % ophthalmic emulsion  DULoxetine (CYMBALTA) 60 MG capsule  empagliflozin (JARDIANCE) 25 MG TABS tablet  EPINEPHrine (ANY BX GENERIC EQUIV) 0.3 MG/0.3ML injection 2-pack  fluticasone (FLONASE) 50 MCG/ACT nasal spray  Fluticasone-Umeclidin-Vilanterol (TRELEGY ELLIPTA) 200-62.5-25 MCG/ACT oral inhaler  GLUCOSAMINE-CHONDROITIN -400 MG tablet  levalbuterol (XOPENEX) 1.25 MG/3ML neb solution  losartan (COZAAR) 50 MG tablet  Nutritional Supplements (GLUCERNA SHAKE) LIQD  omega-3 acid ethyl esters (LOVAZA) 1 g capsule  omeprazole (PRILOSEC) 20 MG DR capsule  oxyCODONE (ROXICODONE) 5 MG tablet  polyethylene glycol (MIRALAX) 17 GM/Dose powder  polyethylene glycol-propylene glycol (SYSTANE) 0.4-0.3 % SOLN ophthalmic solution  pregabalin (LYRICA) 150 MG capsule  roflumilast (DALIRESP) 500 MCG TABS tablet  tirzepatide (MOUNJARO) 5 MG/0.5ML pen      Allergies   Allergen Reactions    Aspirin      325mg     Bee Venom Anaphylaxis    Penicillins Hives    Acetaminophen GI Disturbance    Azithromycin Dizziness    Colon Care     Interferons Dermatitis    Metformin GI Disturbance     Family History  Family History   Problem Relation Age of Onset    Thyroid Disease Mother     Cerebrovascular Disease Mother     Hypertension Mother     Hypertension Father     Glaucoma Father     Cancer Sister     Lung Cancer Sister     Diabetes Brother     Cancer Brother     Diabetes Brother     Cancer Brother     Diabetes Brother     Deep Vein Thrombosis (DVT) Daughter     Depression Daughter     Alcohol/Drug Other         self    Diabetes Other         self    Thyroid Disease Other         self    Asthma Other         self    Macular Degeneration No family hx of     Anesthesia Reaction No family hx of      Social History   Social History     Tobacco Use    Smoking status: Former     Current packs/day: 0.00     Types: Cigarettes      Quit date:      Years since quittin.7     Passive exposure: Past    Smokeless tobacco: Never    Tobacco comments:     2023 Patient using 14 mg patch, wants to have prescription for Nicotrol inhaler, took workbook   Vaping Use    Vaping status: Never Used   Substance Use Topics    Alcohol use: Not Currently    Drug use: No      A medically appropriate review of systems was performed with pertinent positives and negatives noted in the HPI, and all other systems negative.    Physical Exam   BP: (!) 178/77  Pulse: 99  Temp: 98.4  F (36.9  C)  Resp: 20  SpO2: 96 %  Physical Exam  GENERAL APPEARANCE: The patient is well developed, well appearing, and in no acute distress.  HEAD:  Normocephalic and atraumatic.   EENT: Voice normal.  NECK: Trachea is midline.No lymphadenopathy or tenderness.  LUNGS: Breath sounds are equal and clear bilaterally. No wheezes, rhonchi, or rales.  HEART: Regular rate and normal rhythm.    ABDOMEN: Soft, flat, and benign. No mass, tenderness, guarding, or rebound.  EXTREMITIES: No cyanosis, clubbing, or edema.Patient reports tenderness to palpation midline lumbar region without any SI joint tenderness, overlying rashes, other skin changes.  No midline C-spine or T-spine tenderness to palpation.  NEUROLOGIC: No focal sensory or motor deficits are noted.  Gross sensation intact in the lower extremities bilaterally including digits 1 through 5 bilaterally.  Resisted plantarflexion dorsiflexion is 5 out of 5 bilaterally.  Resisted hip flexion 5 out of 5 bilaterally while seated.  Straight leg raise negative bilaterally.    PSYCHIATRIC: The patient is awake, alert.  Appropriate mood and affect.  SKIN: Warm, dry, and well perfused. Good turgor.    ED Course, Procedures, & Data           Results for orders placed or performed during the hospital encounter of 10/11/24   CT Lumbar Spine w/o Contrast     Status: None    Narrative    EXAM: CT LUMBAR SPINE W/O CONTRAST  10/11/2024 3:47 PM      HISTORY:  Midline low back pain history of questionable lytic lesion  further evaluate       COMPARISON:  Body CT 9/30/2024. MRI lumbar spine 8/15/2024.    TECHNIQUE: Using multidetector thin collimation helical acquisition  technique, axial, sagittal and coronal 2 mm thickness CT  reconstructions were obtained through the lumbar spine without  intravenous contrast.  Images were viewed in bone and soft tissue  windows.    FINDINGS:  There are 5 lumbar type vertebrae, used for the purposes of this  dictation. Minimal anterolisthesis at L4-5, unchanged. Mild loss of  disc height at L1-2, L3-4, and L4-5. Superior endplate Schmorl's node  deformity at L2, unchanged since 9/30/2024. No acute fracture.    Findings on a level by level basis are as follows:    T12-L1: Bilateral facet hypertrophy. Mild right neural foraminal  narrowing. No left neural foraminal stenosis. No spinal canal  stenosis.    L1-L2: Bilateral facet hypertrophy. Minimal asymmetric right disc  bulge. No spinal canal or neural foraminal stenosis.    L2-L3: Bilateral facet hypertrophy and ligamentum flavum thickening.  Mild spinal canal narrowing. Mild bilateral neural foraminal  narrowing.    L3-L4: Circumferential disc bulge. Bilateral facet hypertrophy and  ligamentum flavum thickening. Moderate spinal canal stenosis. Mild  bilateral neural foraminal narrowing.    L4-L5: Grade 1 anterolisthesis. Asymmetric right disc osteophyte  complex. Bilateral facet hypertrophy and ligamentum flavum thickening.  Moderate to severe spinal canal stenosis. Moderate right and mild left  neural foraminal narrowing.    L5-S1: Posterior disc bulge. Left greater than right facet  hypertrophy. Ligamentum flavum thickening. Mild spinal canal  narrowing. Mild bilateral neural foraminal narrowing.    The visualized adjacent paraspinous tissues are grossly within normal  limits.    Aortobiiliac atherosclerotic calcification. Distended urinary bladder.      Impression     IMPRESSION:  1. Unchanged L2 superior endplate Schmorl's node deformity.  2. No acute fracture or traumatic subluxation.  3. Multilevel lumbar spondylosis most pronounced at L4-5 where there  is moderate to severe spinal canal stenosis, moderate right and mild  left neural foraminal narrowing.    RICARDA HANSEN MD         SYSTEM ID:  [id number]   UA with Microscopic reflex to Culture     Status: Abnormal    Specimen: Urine, Midstream   Result Value Ref Range    Color Urine Light Yellow Colorless, Straw, Light Yellow, Yellow    Appearance Urine Clear Clear    Glucose Urine >=1000 (A) Negative mg/dL    Bilirubin Urine Negative Negative    Ketones Urine 10 (A) Negative mg/dL    Specific Gravity Urine 1.034 1.003 - 1.035    Blood Urine Negative Negative    pH Urine 5.5 5.0 - 7.0    Protein Albumin Urine 20 (A) Negative mg/dL    Urobilinogen Urine Normal Normal, 2.0 mg/dL    Nitrite Urine Negative Negative    Leukocyte Esterase Urine Negative Negative    Mucus Urine Present (A) None Seen /LPF    RBC Urine 1 <=2 /HPF    WBC Urine 1 <=5 /HPF    Squamous Epithelials Urine 2 (H) <=1 /HPF    Narrative    Urine Culture not indicated     Medications   oxyCODONE (ROXICODONE) tablet 5 mg (5 mg Oral $Given 10/11/24 1506)   ondansetron (ZOFRAN ODT) ODT tab 4 mg (4 mg Oral $Given 10/11/24 1633)     Labs Ordered and Resulted from Time of ED Arrival to Time of ED Departure   ROUTINE UA WITH MICROSCOPIC REFLEX TO CULTURE - Abnormal       Result Value    Color Urine Light Yellow      Appearance Urine Clear      Glucose Urine >=1000 (*)     Bilirubin Urine Negative      Ketones Urine 10 (*)     Specific Gravity Urine 1.034      Blood Urine Negative      pH Urine 5.5      Protein Albumin Urine 20 (*)     Urobilinogen Urine Normal      Nitrite Urine Negative      Leukocyte Esterase Urine Negative      Mucus Urine Present (*)     RBC Urine 1      WBC Urine 1      Squamous Epithelials Urine 2 (*)      CT Lumbar Spine w/o Contrast   Final  Result   IMPRESSION:   1. Unchanged L2 superior endplate Schmorl's node deformity.   2. No acute fracture or traumatic subluxation.   3. Multilevel lumbar spondylosis most pronounced at L4-5 where there   is moderate to severe spinal canal stenosis, moderate right and mild   left neural foraminal narrowing.      RICARDA HANSEN MD            SYSTEM ID:  [id number]             Critical care was not performed.     Medical Decision Making  The patient's presentation was of high complexity (a chronic illness severe exacerbation, progression, or side effect of treatment).    The patient's evaluation involved:  review of external note(s) from 1 sources (see separate area of note for details)  discussion of management or test interpretation with another health professional (Neurosurgery)    The patient's management necessitated moderate risk (prescription drug management including medications given in the ED).    Assessment & Plan    This is a 68-year-old female with known history of chronic low back pain presenting with concerns for increasing back pain in the setting of recent neurosurgery evaluation yesterday.  On presentation to the department she has reassuring vital signs, she is afebrile, normoxic, without tachycardia, mildly hypertensive 178/77.  On exam she has tenderness to palpation of the midline lumbar spine with intact strength and sensation to the lower extremities.  Broad differential considered including possibility of acute cord compression, epidural abscess, nephrolithiasis, UTI, amongst others.  Considered radiculopathy as well in the differential.  Patient is not having any symptoms at this time concerning for acute cord compression.  Will initiate workup including CT lumbar spine to evaluate possibility of compression fracture or other bony etiology as well as UA to evaluate possibility of UTI or nephrolithiasis.  Patient given oxycodone oral for pain control as well as Tylenol in the department.    I  did briefly speak with neurosurgery who does not recommend any other advanced imaging or neurosurgery intervention as she was just seen by their service yesterday.    UA without findings suggest infection, does show glucose urea and ketones consistent with patient's known history of diabetes.  CT L-spine negative for fracture, dislocation does show known L2 superior endplate no deformity.    On reevaluation patient continues to report discomfort.  I did offer her observation stay for further management of her pain including PT consult and patient feels like she can manage her pain at home.  She will be discharged with short supply of oxycodone as she did run out I did review the PDMP.  She has not yet followed up with pain management I did discuss with her they may have some additional recommendations to help her pain going forward.  She is aware she can certainly return back to the emergency department she feels she is unable to care for self at home or develops any other new or worsening symptoms.    Patient seen and discussed with attending physician , who agrees with my plan of care.      I have reviewed the nursing notes. I have reviewed the findings, diagnosis, plan and need for follow up with the patient.    New Prescriptions    OXYCODONE (ROXICODONE) 5 MG TABLET    Take 1 tablet (5 mg) by mouth every 6 hours as needed for breakthrough pain or severe pain.       Final diagnoses:   Acute midline low back pain with bilateral sciatica       ESPINOZA Raymond  [facility] EMERGENCY DEPARTMENT  10/11/2024     Camille Winchester, DESIRAE  10/11/24 7735    --    ED Attending Physician Attestation    I Jose Dyson MD, cared for this patient with the Advanced Practice Provider (ANN). I personally provided a substantive portion of the care for this patient, including approving the care plan for the number and complexity of problems addressed and taking responsibility related to the risk of  complications and/or morbidity or mortality of patient management. Please see the ANN's documentation for full details.          Jose Dyson MD  Emergency Medicine     This note was created at least in part by the use of dragon voice dictation system. Inadvertent typographical errors may still exist.  Jose Dyson MD.  Patient evaluated in the emergency department during the COVID-19 pandemic period. Careful attention to patients safety was addressed throughout the evaluation. Evaluation and treatment management was initiated with disposition made efficiently and appropriate as possible to minimize any risk of potential exposure to patient during this evaluation.           Jose Dyson MD  10/11/24 2040

## 2024-10-14 ENCOUNTER — DOCUMENTATION ONLY (OUTPATIENT)
Dept: INTERNAL MEDICINE | Facility: CLINIC | Age: 69
End: 2024-10-14
Payer: COMMERCIAL

## 2024-10-14 NOTE — PROGRESS NOTES
Type of Form Received: Home Health [facility] 08930    Form Received (Date) 10/11/24   Form Filled out No   Placed in provider folder Yes

## 2024-10-14 NOTE — PROGRESS NOTES
10/14/24 10:49 AM : Appointment reminder phone call made to patient.No Answer. LVM Advising pt to arrive 15 mins early, and to bring glucometer, and Provided address

## 2024-10-15 ENCOUNTER — OFFICE VISIT (OUTPATIENT)
Dept: ENDOCRINOLOGY | Facility: CLINIC | Age: 69
End: 2024-10-15
Payer: COMMERCIAL

## 2024-10-15 VITALS
SYSTOLIC BLOOD PRESSURE: 119 MMHG | WEIGHT: 189 LBS | HEART RATE: 107 BPM | BODY MASS INDEX: 30.51 KG/M2 | OXYGEN SATURATION: 93 % | DIASTOLIC BLOOD PRESSURE: 69 MMHG

## 2024-10-15 DIAGNOSIS — E66.812 CLASS 2 SEVERE OBESITY DUE TO EXCESS CALORIES WITH SERIOUS COMORBIDITY AND BODY MASS INDEX (BMI) OF 36.0 TO 36.9 IN ADULT (H): ICD-10-CM

## 2024-10-15 DIAGNOSIS — N18.30 TYPE 2 DIABETES MELLITUS WITH STAGE 3 CHRONIC KIDNEY DISEASE, WITHOUT LONG-TERM CURRENT USE OF INSULIN, UNSPECIFIED WHETHER STAGE 3A OR 3B CKD (H): ICD-10-CM

## 2024-10-15 DIAGNOSIS — E11.22 TYPE 2 DIABETES MELLITUS WITH STAGE 3 CHRONIC KIDNEY DISEASE, WITHOUT LONG-TERM CURRENT USE OF INSULIN, UNSPECIFIED WHETHER STAGE 3A OR 3B CKD (H): ICD-10-CM

## 2024-10-15 DIAGNOSIS — E11.65 TYPE 2 DIABETES MELLITUS WITH HYPERGLYCEMIA, WITHOUT LONG-TERM CURRENT USE OF INSULIN (H): ICD-10-CM

## 2024-10-15 DIAGNOSIS — E66.01 CLASS 2 SEVERE OBESITY DUE TO EXCESS CALORIES WITH SERIOUS COMORBIDITY AND BODY MASS INDEX (BMI) OF 36.0 TO 36.9 IN ADULT (H): ICD-10-CM

## 2024-10-15 PROCEDURE — 99215 OFFICE O/P EST HI 40 MIN: CPT | Performed by: PHYSICIAN ASSISTANT

## 2024-10-15 RX ORDER — ACYCLOVIR 400 MG/1
TABLET ORAL
Qty: 1 EACH | Refills: 0 | OUTPATIENT
Start: 2024-10-15 | End: 2024-10-15

## 2024-10-15 RX ORDER — ACYCLOVIR 400 MG/1
TABLET ORAL
Qty: 3 EACH | Refills: 5 | Status: SHIPPED | OUTPATIENT
Start: 2024-10-15

## 2024-10-15 RX ORDER — FLUCONAZOLE 150 MG/1
150 TABLET ORAL
Qty: 3 TABLET | Refills: 0 | Status: SHIPPED | OUTPATIENT
Start: 2024-10-15

## 2024-10-15 RX ORDER — FLUCONAZOLE 150 MG/1
150 TABLET ORAL
Qty: 3 TABLET | Refills: 0 | Status: SHIPPED | OUTPATIENT
Start: 2024-10-15 | End: 2024-10-15

## 2024-10-15 RX ORDER — INSULIN ASPART 100 [IU]/ML
2-15 INJECTION, SOLUTION INTRAVENOUS; SUBCUTANEOUS
Qty: 15 ML | Refills: 0 | Status: SHIPPED | OUTPATIENT
Start: 2024-10-15 | End: 2024-10-18

## 2024-10-15 NOTE — PROGRESS NOTES
Diabetes Consult Note  October 15, 2024        Jenn Aparicio  is a 69 year old female who has a past medical history of Adenocarcinoma, lung (H), Asthma, Chronic left-sided low back pain with left-sided sciatica, Chronic respiratory failure with hypoxia (H), Ectopic pregnancy, Esophageal reflux, Pulmonary emphysema (H), and Type II diabetes mellitus (H). She is here for follow-up of diabetes.      She previously has worked with Dr. Baeza and the pharmacy team but is new to me.  Her diabetes is known to be complicated by neuropathy for which she takes Lyrica, AALA supplementation and Lyrica.  She also has dyslipidemia for which she takes atorvastatin and microalbuminuria for which she takes losartan.  She also has a history of pulmonary adenocarcinoma and COPD for which she continues to follow oncology.         Today:  Reports concerned about her appetite and not eating very much. EMS called due to back pain and was not good experience.   Was at hospital due to back pain.  Didn't eat all day, but got home and had a hard time eating, couldn't even take her medications due to nausea. Was a Friday after giving injection on Thursday.  But it is hard to say how much discomfort is due to pain.    Has yeast under breasts.  It is red somewhat painful and itchy and has an odor.  She would like some powder for this.      Forces self to take Mounjaro as controlling her blood sugar quite well, but she really feels sick on it a lot.  She generally gives it on Thursday and does not eat Thursday or Friday, by Monday she will be eating more.  She thinks this is generally consistent with when she takes her Mounjaro but not sure because it might be due to low low back pain it certainly was in the last week.      She would like to take short acting insulin for her diabetes as this is always worked well in the hospital.  She has taken doses including 1 unit per 25 greater than 141 unit per 50 greater than 140 and most recently  just 1 unit per 100 greater than 140 before meals.  She wonders about insulin pump.    Current Diabetes Medications:  Jardiance 25 mg daily  Mounjaro 5 mg weekly -it does seem she has missed this perhaps a couple of weeks.     She has been intolerant of metformin due to GI disturbance in the past and does not want to take that again.  She previously has taken glipizide but believes she was told to discontinue this due to swelling.      We reviewed glucometer/CGMS data together.  It revealed:  She does not currently have this but it has been covered in the past.    History of DKA: Denies    History of hospitalizations for diabetes: Yes    Ability to sense low blood sugars: Reports intact      Jenn's PMH, PSH and allergies were reviewed today and pertinent information is summarized above.    Jenn's pertinent social and family history are also reviewed today and pertinent information is summarized above.  Also noted is: She lives on her own and denies difficulty accessing food.      Current Outpatient Medications   Medication Sig Dispense Refill    acetylcysteine (MUCOMYST) 10 % nebulizer solution Inhale 4 mLs into the lungs 4 times daily as needed for mucolysis/respiratory distress 30 mL 5    albuterol (PROAIR HFA/PROVENTIL HFA/VENTOLIN HFA) 108 (90 Base) MCG/ACT inhaler USE 1 OR 2 INHALATIONS EVERY 4 HOURS AS NEEDED 17 g 11    albuterol (PROVENTIL) (2.5 MG/3ML) 0.083% neb solution Take 1 vial (2.5 mg) by nebulization 4 times daily 360 mL 5    alpha-lipoic acid 600 MG capsule Take 1 capsule (600 mg) by mouth daily 90 capsule 3    apixaban ANTICOAGULANT (ELIQUIS) 5 MG tablet Take 1 tablet (5 mg) by mouth 2 times daily      atorvastatin (LIPITOR) 10 MG tablet Take 1 tablet (10 mg) by mouth daily 90 tablet 3    carboxymethylcellulose (REFRESH LIQUIGEL) 1 % ophthalmic solution Place 1 drop into both eyes 4 times daily 15 mL 11    cetirizine (ZYRTEC) 10 MG tablet Take 1 tablet (10 mg) by mouth every morning 90 tablet 3     cyanocobalamin (VITAMIN B-12) 500 MCG tablet Take 1 tablet (500 mcg) by mouth daily 90 tablet 3    cyclobenzaprine (FLEXERIL) 5 MG tablet Take 1 tablet (5 mg) by mouth 2 times daily as needed for muscle spasms. 60 tablet 0    cycloSPORINE (RESTASIS) 0.05 % ophthalmic emulsion Place 1 drop into both eyes 2 times daily. 60 each 11    DULoxetine (CYMBALTA) 60 MG capsule Take 1 capsule (60 mg) by mouth 2 times daily. 60 capsule 0    empagliflozin (JARDIANCE) 25 MG TABS tablet Take 1 tablet (25 mg) by mouth every morning 90 tablet 3    EPINEPHrine (ANY BX GENERIC EQUIV) 0.3 MG/0.3ML injection 2-pack Inject 0.3 mLs (0.3 mg) into the muscle as needed for anaphylaxis (related to bee stings) May repeat one time in 5-15 minutes if response to initial dose is inadequate. 2 each 1    fluticasone (FLONASE) 50 MCG/ACT nasal spray Spray 1 spray into both nostrils 2 times daily Use at night before bed 15.8 mL 3    Fluticasone-Umeclidin-Vilanterol (TRELEGY ELLIPTA) 200-62.5-25 MCG/ACT oral inhaler USE 1 INHALATION DAILY 28 each 5    GLUCOSAMINE-CHONDROITIN -400 MG tablet TAKE 1 TABLET DAILY (Patient taking differently: Take 1 tablet by mouth every evening.) 90 tablet 3    levalbuterol (XOPENEX) 1.25 MG/3ML neb solution Take 3 mLs (1.25 mg) by nebulization every 4 hours as needed for wheezing.      losartan (COZAAR) 50 MG tablet Take 1 tablet (50 mg) by mouth daily 90 tablet 3    Nutritional Supplements (GLUCERNA SHAKE) LIQD Take 1 Can by mouth 3 times daily (with meals).      omega-3 acid ethyl esters (LOVAZA) 1 g capsule Take 2 capsules (2 g) by mouth 2 times daily 360 capsule 3    omeprazole (PRILOSEC) 20 MG DR capsule Take 1 capsule (20 mg) by mouth 2 times daily. 90 capsule 1    polyethylene glycol (MIRALAX) 17 GM/Dose powder Take 17 g by mouth daily      polyethylene glycol-propylene glycol (SYSTANE) 0.4-0.3 % SOLN ophthalmic solution Place 1 drop into both eyes 4 times daily 5 mL 11    pregabalin (LYRICA) 150 MG  "capsule Take 1 capsule (150 mg) by mouth 2 times daily TAKE 1 CAPSULE(150 MG) BY MOUTH TWICE DAILY 180 capsule 3    roflumilast (DALIRESP) 500 MCG TABS tablet Take 1 tablet (500 mcg) by mouth every morning 90 tablet 3    tirzepatide (MOUNJARO) 5 MG/0.5ML pen Inject 5 mg Subcutaneous every 7 days 6 mL 3    Continuous Blood Gluc  (DEXCOM G7 ) JOSEPHINE Use to read blood sugars as per 's instructions. (Patient not taking: Reported on 10/15/2024) 1 each 0    Continuous Glucose Sensor (DEXCOM G7 SENSOR) MISC Change every 10 days. (Patient not taking: Reported on 10/15/2024) 3 each 5     No current facility-administered medications for this visit.         ROS:   Reports good physical activity tolerance.  Denies any pedal lesions or vision changes or concerns. Denies any other acute concerns except as noted above.      Exam:    /69   Pulse 107   Wt 85.7 kg (189 lb)   SpO2 93%   BMI 30.51 kg/m    Wt Readings from Last 10 Encounters:   10/15/24 85.7 kg (189 lb)   10/02/24 84.8 kg (187 lb)   09/26/24 85 kg (187 lb 4.8 oz)   09/16/24 89.4 kg (197 lb 3.2 oz)   08/26/24 93.8 kg (206 lb 12.7 oz)   07/29/24 90.7 kg (200 lb)   07/22/24 90.9 kg (200 lb 6.4 oz)   06/13/24 93 kg (205 lb)   04/29/24 93.7 kg (206 lb 8 oz)   04/09/24 91.8 kg (202 lb 6.4 oz)     Estimated body mass index is 30.51 kg/m  as calculated from the following:    Height as of 9/16/24: 1.676 m (5' 6\").    Weight as of this encounter: 85.7 kg (189 lb).      General: Pleasant, well nourished and hydrated articulate female in NAD.   Psych:  Mood is \"good,\" affect is appropriate.  Thought form and content are fluid and coherent.    HEENT: Eyes and sclera are clear. Extraocular movements are grossly intact without proptosis.  Nares are patent, mucous membranes moist.  Neck: No masses or JVD are noted.    Resp: Easy and unlabored breathing.   Neuro: Alert and oriented, communicating clearly.   Ext: no swelling or edema.    Data:  " "    Recent Labs   Lab Test 09/30/24  1227 09/30/24  1210 08/17/24  1155 08/17/24  0205 08/16/24  2321 08/14/24  1911 07/09/24  1039 05/15/24  1024 01/18/24  1540 11/27/23  0833 10/26/23  0926 10/26/23  0912 08/23/23  1214 08/23/23  0926 05/25/23  0909 04/17/23  0937   A1C  --  7.6*  --   --   --   --  8.7*  --    < > 8.1*  --   --    < >  --   --   --    TSH  --   --   --  3.46  --   --   --   --   --   --   --  3.79  --   --   --   --    LDL  --   --   --   --   --   --   --   --   --  77  --  146*  --   --   --   --    HDL  --   --   --   --   --   --   --   --   --  76  --  55  --   --   --   --    TRIG  --   --   --   --   --   --   --   --   --  64  --  96  --   --   --   --    CR 0.6 0.50*   < > 1.35* 1.18*   < >  --  0.56   < > 0.59   < > 0.56   < >  --    < >  --    MICROL  --   --   --   --   --   --   --   --   --   --   --   --   --  158.0  --  38.1   AST  --  29  --   --  21  --   --  19   < > 17  --  18   < >  --    < >  --    ALT  --   --   --   --  6  --   --  12   < > 8  --  10   < >  --    < >  --     < > = values in this interval not displayed.     No results found for: \"CPEPT\", \"GADAB\", \"ISCAB\"  Cholesterol   Date Value Ref Range Status   11/27/2023 166 <200 mg/dL Final   10/26/2023 220 (H) <200 mg/dL Final       Hemoglobin   Date Value Ref Range Status   09/30/2024 13.2 11.7 - 15.7 g/dL Final   06/25/2021 12.9 11.7 - 15.7 g/dL Final   ]      Most recent GFRs:  GFR Estimate   Date Value Ref Range Status   09/30/2024 >90 >60 mL/min/1.73m2 Final     Comment:     eGFR calculated using 2021 CKD-EPI equation.   08/26/2024 >90 >60 mL/min/1.73m2 Final     Comment:     eGFR calculated using 2021 CKD-EPI equation.   08/25/2024 >90 >60 mL/min/1.73m2 Final     Comment:     eGFR calculated using 2021 CKD-EPI equation.   06/25/2021 >90 >60 mL/min/[1.73_m2] Final     Comment:     Non  GFR Calc  Starting 12/18/2018, serum creatinine based estimated GFR (eGFR) will be   calculated using the " "Chronic Kidney Disease Epidemiology Collaboration   (CKD-EPI) equation.     06/24/2021 >90 >60 mL/min/[1.73_m2] Final     Comment:     Non  GFR Calc  Starting 12/18/2018, serum creatinine based estimated GFR (eGFR) will be   calculated using the Chronic Kidney Disease Epidemiology Collaboration   (CKD-EPI) equation.     06/23/2021 >90 >60 mL/min/[1.73_m2] Final     Comment:     Non  GFR Calc  Starting 12/18/2018, serum creatinine based estimated GFR (eGFR) will be   calculated using the Chronic Kidney Disease Epidemiology Collaboration   (CKD-EPI) equation.       GFR, ESTIMATED POCT   Date Value Ref Range Status   09/30/2024 >60 >60 mL/min/1.73m2 Final   05/15/2024 >60 >60 mL/min/1.73m2 Final   10/26/2023 >60 >60 mL/min/1.73m2 Final       No results found for: \"CPEPT\", \"GADAB\", \"ISCAB\"  FIB-4 Calculation: 1.78 at 8/26/2024  9:51 AM  Calculated from:  SGOT/AST: 21 U/L at 8/16/2024 11:21 PM  SGPT/ALT: 6 U/L at 8/16/2024 11:21 PM  Platelets: 333 10e3/uL at 8/26/2024  9:51 AM  Age: 69 years  AST   Date Value Ref Range Status   09/30/2024 29 0 - 45 U/L Final     Comment:     Specimen is hemolyzed which can falsely elevate AST. Analysis of a non-hemolyzed specimen may result in a lower value.   04/02/2021 10 0 - 45 U/L Final     Lab Results   Component Value Date    ALT  09/30/2024      Comment:      Unsatisfactory specimen - hemolyzed      ALT 16 04/02/2021             Assessment/Plan:    Jenn Aparicio is a 69 year old female with type 2 diabetes and weight that has been oscillating in recent months and appears overall to be down about 11 pounds.    Type 2 diabetes: Jenn has blood sugar that is controlled well on Mounjaro but she is struggling with appetite.  She admits this may be related to low back pain and agrees to continue to monitor this, however she thinks that most likely she is going to want to discontinue Mounjaro as she intermittently continues to have nausea and and certainly " no appetite.  If she does discontinue this she feels confident her blood sugar will rise and upon discussing options she would like to use short acting insulin.  Most recently without steroids she has been using just 1 unit per 100 greater than 140 Premeal and if needed I would recommend that she start there.  Discussed the importance of resuming CGMS particularly if she does discontinue the Mounjaro and she agrees with this. She will meet with diabetes education to review Mounjaro use and discussed possible transition to insulin which she will  if she discontinues Mounjaro prior to seeing diabetes education in the coming weeks.  Return to clinic in 3 months.       2.  Yeast dermatitis: Discussed use of oral fluconazole followed by gentle but thorough drying following shower and use of miconazole powder.  If not resolving she should see primary care.        45 minutes spent on the date of the encounter doing chart review, history and exam, documentation, education and counseling, as well as communication and coordination of care, and further activities as noted above.  This time includes time spent reviewing CGM.      It is my privilege to be involved in the care of the above patient.     Mariana López PA-C, MPAS  [facility]  Diabetes, Endocrinology, and Metabolism  [phone] Appointments/Nurse  [phone] pager  [phone] nurse line    This note was completed in part using Dragon voice recognition, and may contain word and grammatical errors.

## 2024-10-15 NOTE — LETTER
10/15/2024       RE: Jenn Aparicio  [address]  [place] [address]     Dear Colleague,    Thank you for referring your patient, Jenn Aparicio, to the [facility] [place] [facility]. Please see a copy of my visit note below.    10/14/24 10:49 AM : Appointment reminder phone call made to patient.No Answer. LVM Advising pt to arrive 15 mins early, and to bring glucometer, and Provided address               Diabetes Consult Note  October 15, 2024        Jenn Aparicio  is a 69 year old female who has a past medical history of Adenocarcinoma, lung (H), Asthma, Chronic left-sided low back pain with left-sided sciatica, Chronic respiratory failure with hypoxia (H), Ectopic pregnancy, Esophageal reflux, Pulmonary emphysema (H), and Type II diabetes mellitus (H). She is here for follow-up of diabetes.      She previously has worked with Dr. Baeza and the pharmacy team but is new to me.  Her diabetes is known to be complicated by neuropathy for which she takes Lyrica, AALA supplementation and Lyrica.  She also has dyslipidemia for which she takes atorvastatin and microalbuminuria for which she takes losartan.  She also has a history of pulmonary adenocarcinoma and COPD for which she continues to follow oncology.         Today:  Reports concerned about her appetite and not eating very much. EMS called due to back pain and was not good experience.   Was at hospital due to back pain.  Didn't eat all day, but got home and had a hard time eating, couldn't even take her medications due to nausea. Was a Friday after giving injection on Thursday.  But it is hard to say how much discomfort is due to pain.    Has yeast under breasts.  It is red somewhat painful and itchy and has an odor.  She would like some powder for this.      Forces self to take Mounjaro as controlling her blood sugar quite well, but she really feels sick on it a lot.  She  generally gives it on Thursday and does not eat Thursday or Friday, by Monday she will be eating more.  She thinks this is generally consistent with when she takes her Mounjaro but not sure because it might be due to low low back pain it certainly was in the last week.      She would like to take short acting insulin for her diabetes as this is always worked well in the hospital.  She has taken doses including 1 unit per 25 greater than 141 unit per 50 greater than 140 and most recently just 1 unit per 100 greater than 140 before meals.  She wonders about insulin pump.    Current Diabetes Medications:  Jardiance 25 mg daily  Mounjaro 5 mg weekly -it does seem she has missed this perhaps a couple of weeks.     She has been intolerant of metformin due to GI disturbance in the past and does not want to take that again.  She previously has taken glipizide but believes she was told to discontinue this due to swelling.      We reviewed glucometer/CGMS data together.  It revealed:  She does not currently have this but it has been covered in the past.    History of DKA: Denies    History of hospitalizations for diabetes: Yes    Ability to sense low blood sugars: Reports intact      Jenn's PMH, PSH and allergies were reviewed today and pertinent information is summarized above.    Jenn's pertinent social and family history are also reviewed today and pertinent information is summarized above.  Also noted is: She lives on her own and denies difficulty accessing food.      Current Outpatient Medications   Medication Sig Dispense Refill     acetylcysteine (MUCOMYST) 10 % nebulizer solution Inhale 4 mLs into the lungs 4 times daily as needed for mucolysis/respiratory distress 30 mL 5     albuterol (PROAIR HFA/PROVENTIL HFA/VENTOLIN HFA) 108 (90 Base) MCG/ACT inhaler USE 1 OR 2 INHALATIONS EVERY 4 HOURS AS NEEDED 17 g 11     albuterol (PROVENTIL) (2.5 MG/3ML) 0.083% neb solution Take 1 vial (2.5 mg) by nebulization 4 times  daily 360 mL 5     alpha-lipoic acid 600 MG capsule Take 1 capsule (600 mg) by mouth daily 90 capsule 3     apixaban ANTICOAGULANT (ELIQUIS) 5 MG tablet Take 1 tablet (5 mg) by mouth 2 times daily       atorvastatin (LIPITOR) 10 MG tablet Take 1 tablet (10 mg) by mouth daily 90 tablet 3     carboxymethylcellulose (REFRESH LIQUIGEL) 1 % ophthalmic solution Place 1 drop into both eyes 4 times daily 15 mL 11     cetirizine (ZYRTEC) 10 MG tablet Take 1 tablet (10 mg) by mouth every morning 90 tablet 3     cyanocobalamin (VITAMIN B-12) 500 MCG tablet Take 1 tablet (500 mcg) by mouth daily 90 tablet 3     cyclobenzaprine (FLEXERIL) 5 MG tablet Take 1 tablet (5 mg) by mouth 2 times daily as needed for muscle spasms. 60 tablet 0     cycloSPORINE (RESTASIS) 0.05 % ophthalmic emulsion Place 1 drop into both eyes 2 times daily. 60 each 11     DULoxetine (CYMBALTA) 60 MG capsule Take 1 capsule (60 mg) by mouth 2 times daily. 60 capsule 0     empagliflozin (JARDIANCE) 25 MG TABS tablet Take 1 tablet (25 mg) by mouth every morning 90 tablet 3     EPINEPHrine (ANY BX GENERIC EQUIV) 0.3 MG/0.3ML injection 2-pack Inject 0.3 mLs (0.3 mg) into the muscle as needed for anaphylaxis (related to bee stings) May repeat one time in 5-15 minutes if response to initial dose is inadequate. 2 each 1     fluticasone (FLONASE) 50 MCG/ACT nasal spray Spray 1 spray into both nostrils 2 times daily Use at night before bed 15.8 mL 3     Fluticasone-Umeclidin-Vilanterol (TRELEGY ELLIPTA) 200-62.5-25 MCG/ACT oral inhaler USE 1 INHALATION DAILY 28 each 5     GLUCOSAMINE-CHONDROITIN -400 MG tablet TAKE 1 TABLET DAILY (Patient taking differently: Take 1 tablet by mouth every evening.) 90 tablet 3     levalbuterol (XOPENEX) 1.25 MG/3ML neb solution Take 3 mLs (1.25 mg) by nebulization every 4 hours as needed for wheezing.       losartan (COZAAR) 50 MG tablet Take 1 tablet (50 mg) by mouth daily 90 tablet 3     Nutritional Supplements (GLUCERNA SHAKE)  "LIQD Take 1 Can by mouth 3 times daily (with meals).       omega-3 acid ethyl esters (LOVAZA) 1 g capsule Take 2 capsules (2 g) by mouth 2 times daily 360 capsule 3     omeprazole (PRILOSEC) 20 MG DR capsule Take 1 capsule (20 mg) by mouth 2 times daily. 90 capsule 1     polyethylene glycol (MIRALAX) 17 GM/Dose powder Take 17 g by mouth daily       polyethylene glycol-propylene glycol (SYSTANE) 0.4-0.3 % SOLN ophthalmic solution Place 1 drop into both eyes 4 times daily 5 mL 11     pregabalin (LYRICA) 150 MG capsule Take 1 capsule (150 mg) by mouth 2 times daily TAKE 1 CAPSULE(150 MG) BY MOUTH TWICE DAILY 180 capsule 3     roflumilast (DALIRESP) 500 MCG TABS tablet Take 1 tablet (500 mcg) by mouth every morning 90 tablet 3     tirzepatide (MOUNJARO) 5 MG/0.5ML pen Inject 5 mg Subcutaneous every 7 days 6 mL 3     Continuous Blood Gluc  (DEXCOM G7 ) JOSEPHINE Use to read blood sugars as per 's instructions. (Patient not taking: Reported on 10/15/2024) 1 each 0     Continuous Glucose Sensor (DEXCOM G7 SENSOR) MISC Change every 10 days. (Patient not taking: Reported on 10/15/2024) 3 each 5     No current facility-administered medications for this visit.         ROS:   Reports good physical activity tolerance.  Denies any pedal lesions or vision changes or concerns. Denies any other acute concerns except as noted above.      Exam:    /69   Pulse 107   Wt 85.7 kg (189 lb)   SpO2 93%   BMI 30.51 kg/m    Wt Readings from Last 10 Encounters:   10/15/24 85.7 kg (189 lb)   10/02/24 84.8 kg (187 lb)   09/26/24 85 kg (187 lb 4.8 oz)   09/16/24 89.4 kg (197 lb 3.2 oz)   08/26/24 93.8 kg (206 lb 12.7 oz)   07/29/24 90.7 kg (200 lb)   07/22/24 90.9 kg (200 lb 6.4 oz)   06/13/24 93 kg (205 lb)   04/29/24 93.7 kg (206 lb 8 oz)   04/09/24 91.8 kg (202 lb 6.4 oz)     Estimated body mass index is 30.51 kg/m  as calculated from the following:    Height as of 9/16/24: 1.676 m (5' 6\").    Weight as of this " "encounter: 85.7 kg (189 lb).      General: Pleasant, well nourished and hydrated articulate female in NAD.   Psych:  Mood is \"good,\" affect is appropriate.  Thought form and content are fluid and coherent.    HEENT: Eyes and sclera are clear. Extraocular movements are grossly intact without proptosis.  Nares are patent, mucous membranes moist.  Neck: No masses or JVD are noted.    Resp: Easy and unlabored breathing.   Neuro: Alert and oriented, communicating clearly.   Ext: no swelling or edema.    Data:      Recent Labs   Lab Test 09/30/24  1227 09/30/24  1210 08/17/24  1155 08/17/24  0205 08/16/24  2321 08/14/24  1911 07/09/24  1039 05/15/24  1024 01/18/24  1540 11/27/23  0833 10/26/23  0926 10/26/23  0912 08/23/23  1214 08/23/23  0926 05/25/23  0909 04/17/23  0937   A1C  --  7.6*  --   --   --   --  8.7*  --    < > 8.1*  --   --    < >  --   --   --    TSH  --   --   --  3.46  --   --   --   --   --   --   --  3.79  --   --   --   --    LDL  --   --   --   --   --   --   --   --   --  77  --  146*  --   --   --   --    HDL  --   --   --   --   --   --   --   --   --  76  --  55  --   --   --   --    TRIG  --   --   --   --   --   --   --   --   --  64  --  96  --   --   --   --    CR 0.6 0.50*   < > 1.35* 1.18*   < >  --  0.56   < > 0.59   < > 0.56   < >  --    < >  --    MICROL  --   --   --   --   --   --   --   --   --   --   --   --   --  158.0  --  38.1   AST  --  29  --   --  21  --   --  19   < > 17  --  18   < >  --    < >  --    ALT  --   --   --   --  6  --   --  12   < > 8  --  10   < >  --    < >  --     < > = values in this interval not displayed.     No results found for: \"CPEPT\", \"GADAB\", \"ISCAB\"  Cholesterol   Date Value Ref Range Status   11/27/2023 166 <200 mg/dL Final   10/26/2023 220 (H) <200 mg/dL Final       Hemoglobin   Date Value Ref Range Status   09/30/2024 13.2 11.7 - 15.7 g/dL Final   06/25/2021 12.9 11.7 - 15.7 g/dL Final   ]      Most recent GFRs:  GFR Estimate   Date Value Ref Range " "Status   09/30/2024 >90 >60 mL/min/1.73m2 Final     Comment:     eGFR calculated using 2021 CKD-EPI equation.   08/26/2024 >90 >60 mL/min/1.73m2 Final     Comment:     eGFR calculated using 2021 CKD-EPI equation.   08/25/2024 >90 >60 mL/min/1.73m2 Final     Comment:     eGFR calculated using 2021 CKD-EPI equation.   06/25/2021 >90 >60 mL/min/[1.73_m2] Final     Comment:     Non  GFR Calc  Starting 12/18/2018, serum creatinine based estimated GFR (eGFR) will be   calculated using the Chronic Kidney Disease Epidemiology Collaboration   (CKD-EPI) equation.     06/24/2021 >90 >60 mL/min/[1.73_m2] Final     Comment:     Non  GFR Calc  Starting 12/18/2018, serum creatinine based estimated GFR (eGFR) will be   calculated using the Chronic Kidney Disease Epidemiology Collaboration   (CKD-EPI) equation.     06/23/2021 >90 >60 mL/min/[1.73_m2] Final     Comment:     Non  GFR Calc  Starting 12/18/2018, serum creatinine based estimated GFR (eGFR) will be   calculated using the Chronic Kidney Disease Epidemiology Collaboration   (CKD-EPI) equation.       GFR, ESTIMATED POCT   Date Value Ref Range Status   09/30/2024 >60 >60 mL/min/1.73m2 Final   05/15/2024 >60 >60 mL/min/1.73m2 Final   10/26/2023 >60 >60 mL/min/1.73m2 Final       No results found for: \"CPEPT\", \"GADAB\", \"ISCAB\"  FIB-4 Calculation: 1.78 at 8/26/2024  9:51 AM  Calculated from:  SGOT/AST: 21 U/L at 8/16/2024 11:21 PM  SGPT/ALT: 6 U/L at 8/16/2024 11:21 PM  Platelets: 333 10e3/uL at 8/26/2024  9:51 AM  Age: 69 years  AST   Date Value Ref Range Status   09/30/2024 29 0 - 45 U/L Final     Comment:     Specimen is hemolyzed which can falsely elevate AST. Analysis of a non-hemolyzed specimen may result in a lower value.   04/02/2021 10 0 - 45 U/L Final     Lab Results   Component Value Date    ALT  09/30/2024      Comment:      Unsatisfactory specimen - hemolyzed      ALT 16 04/02/2021             Assessment/Plan:    Jenn " Markus is a 69 year old female with type 2 diabetes and weight that has been oscillating in recent months and appears overall to be down about 11 pounds.    Type 2 diabetes: Jenn has blood sugar that is controlled well on Mounjaro but she is struggling with appetite.  She admits this may be related to low back pain and agrees to continue to monitor this, however she thinks that most likely she is going to want to discontinue Mounjaro as she intermittently continues to have nausea and and certainly no appetite.  If she does discontinue this she feels confident her blood sugar will rise and upon discussing options she would like to use short acting insulin.  Most recently without steroids she has been using just 1 unit per 100 greater than 140 Premeal and if needed I would recommend that she start there.  Discussed the importance of resuming CGMS particularly if she does discontinue the Mounjaro and she agrees with this. She will meet with diabetes education to review Mounjaro use and discussed possible transition to insulin which she will  if she discontinues Mounjaro prior to seeing diabetes education in the coming weeks.  Return to clinic in 3 months.       2.  Yeast dermatitis: Discussed use of oral fluconazole followed by gentle but thorough drying following shower and use of miconazole powder.  If not resolving she should see primary care.        45 minutes spent on the date of the encounter doing chart review, history and exam, documentation, education and counseling, as well as communication and coordination of care, and further activities as noted above.  This time includes time spent reviewing CGM.      It is my privilege to be involved in the care of the above patient.     Mariana López PA-C, MPAS  [facility]  Diabetes, Endocrinology, and Metabolism  [phone] Appointments/Nurse  [phone] pager  [phone] nurse line    This note was completed in part using Dragon  voice recognition, and may contain word and grammatical errors.      Again, thank you for allowing me to participate in the care of your patient.      Sincerely,    Mariana López PA-C

## 2024-10-15 NOTE — PATIENT INSTRUCTIONS
Please see diabetes education for instruction using short acting insulin.    Continue Jardiance 25 mg daily.      My best wishes,    Mariana López PA-C, MPAS  [facility]  Diabetes, Endocrinology, and Metabolism  [phone] Appointments/Nurse  [phone] Fax

## 2024-10-15 NOTE — NURSING NOTE
"Chief Complaint   Patient presents with    Diabetes   Vital signs:      BP: 119/69 Pulse: 107     SpO2: 93 %       Weight: 85.7 kg (189 lb)  Estimated body mass index is 30.51 kg/m  as calculated from the following:    Height as of 9/16/24: 1.676 m (5' 6\").    Weight as of this encounter: 85.7 kg (189 lb).          "

## 2024-10-17 ENCOUNTER — DOCUMENTATION ONLY (OUTPATIENT)
Dept: INTERNAL MEDICINE | Facility: CLINIC | Age: 69
End: 2024-10-17
Payer: COMMERCIAL

## 2024-10-17 ENCOUNTER — TELEPHONE (OUTPATIENT)
Dept: OPHTHALMOLOGY | Facility: CLINIC | Age: 69
End: 2024-10-17
Payer: COMMERCIAL

## 2024-10-17 ENCOUNTER — TELEPHONE (OUTPATIENT)
Dept: PHYSICAL MEDICINE AND REHAB | Facility: CLINIC | Age: 69
End: 2024-10-17
Payer: COMMERCIAL

## 2024-10-17 DIAGNOSIS — N18.30 TYPE 2 DIABETES MELLITUS WITH STAGE 3 CHRONIC KIDNEY DISEASE, WITHOUT LONG-TERM CURRENT USE OF INSULIN, UNSPECIFIED WHETHER STAGE 3A OR 3B CKD (H): ICD-10-CM

## 2024-10-17 DIAGNOSIS — M54.16 LUMBAR RADICULOPATHY: Primary | ICD-10-CM

## 2024-10-17 DIAGNOSIS — E11.22 TYPE 2 DIABETES MELLITUS WITH STAGE 3 CHRONIC KIDNEY DISEASE, WITHOUT LONG-TERM CURRENT USE OF INSULIN, UNSPECIFIED WHETHER STAGE 3A OR 3B CKD (H): ICD-10-CM

## 2024-10-17 NOTE — TELEPHONE ENCOUNTER
No dosing or MDD noted.   Rx pended to provider.   Luanne Yanez RN on 10/17/2024 at 11:26 AM           RE    NOVOLOG FLEXPEN INJ 3ML (ORANGE)       Last Written Prescription Date:  10-15-24  Last Fill Quantity: 15 ml,   # refills: 0  Last Office Visit : 10-15-24  Future Office visit:  4-15-24    Routing refill request to provider for review/approval because:  Insulin and insulin pump supplies - refilled per Endocrine clinic.

## 2024-10-17 NOTE — PROGRESS NOTES
Type of Form Received: [id number]    Form Received (Date) 10/17/24   Form Filled out No   Placed in provider folder Yes

## 2024-10-18 ENCOUNTER — TELEPHONE (OUTPATIENT)
Dept: OPHTHALMOLOGY | Facility: CLINIC | Age: 69
End: 2024-10-18
Payer: COMMERCIAL

## 2024-10-18 RX ORDER — INSULIN ASPART 100 [IU]/ML
4-7 INJECTION, SOLUTION INTRAVENOUS; SUBCUTANEOUS
Qty: 15 ML | Refills: 3 | Status: SHIPPED | OUTPATIENT
Start: 2024-10-18

## 2024-10-18 NOTE — TELEPHONE ENCOUNTER
[facility] Call Center    Phone Message    May a detailed message be left on voicemail: yes     Reason for Call: Other: Pt had been scheduled for YAG CAP RE on 11/22 but had to cancel. Please call pt to r/s. Thank you.      Action Taken: Message routed to:  [facility]    Travel Screening: Not Applicable     Date of Service:

## 2024-10-18 NOTE — TELEPHONE ENCOUNTER
Phone call from patient needing to reschedule her in clinic  did get patient to clinic .          Nadja Gautam on 10/18/2024 at 11:04 AM

## 2024-10-21 ENCOUNTER — TELEPHONE (OUTPATIENT)
Dept: PHYSICAL MEDICINE AND REHAB | Facility: CLINIC | Age: 69
End: 2024-10-21
Payer: COMMERCIAL

## 2024-10-21 PROBLEM — M54.16 LUMBAR RADICULOPATHY: Status: ACTIVE | Noted: 2024-10-17

## 2024-10-21 NOTE — TELEPHONE ENCOUNTER
Called and spoke with patient. Scheduled her at 8:30am on 10/25.     Ebonie Blank, COT 3:46 PM 10/21/2024

## 2024-10-21 NOTE — TELEPHONE ENCOUNTER
Called patient to schedule procedure with Dr. An    Date of Procedure: 11/18/24    Arrival time given: Yes: Arrival Time 9:15am       Procedure Location: [facility]     Verified Location with Patient:  Yes  Address provided to the patient     Pre-op H&P Required:  No: Local anesthesia        Post-Op/Follow Up Appt:  Not Indicated in Request      Informed patient they will need a  to drive them home:  Yes    Patients : Spouse     Patient is aware that pre-op RN from the procedure center will call 2-3 days prior to scheduled procedure to confirm arrival time and review any instructions:  Yes       Additional Comments: N/A        Nadine Nunez MA on 10/21/2024 at 11:30 AM      P: [phone]*

## 2024-10-22 ENCOUNTER — DOCUMENTATION ONLY (OUTPATIENT)
Dept: INTERNAL MEDICINE | Facility: CLINIC | Age: 69
End: 2024-10-22
Payer: COMMERCIAL

## 2024-10-22 NOTE — PROGRESS NOTES
Type of Form Received: [facility] Incontinence Supply Order [id number]    Form Received (Date) 10/21/24   Form Filled out No   Placed in provider folder Yes

## 2024-10-25 ENCOUNTER — OFFICE VISIT (OUTPATIENT)
Dept: OPHTHALMOLOGY | Facility: CLINIC | Age: 69
End: 2024-10-25
Attending: OPHTHALMOLOGY
Payer: COMMERCIAL

## 2024-10-25 ENCOUNTER — DOCUMENTATION ONLY (OUTPATIENT)
Dept: INTERNAL MEDICINE | Facility: CLINIC | Age: 69
End: 2024-10-25

## 2024-10-25 DIAGNOSIS — H26.493 PCO (POSTERIOR CAPSULAR OPACIFICATION), BILATERAL: Primary | ICD-10-CM

## 2024-10-25 PROCEDURE — 250N000009 HC RX 250: Performed by: OPHTHALMOLOGY

## 2024-10-25 PROCEDURE — 66821 AFTER CATARACT LASER SURGERY: CPT | Mod: RT | Performed by: OPHTHALMOLOGY

## 2024-10-25 RX ORDER — PREDNISOLONE ACETATE 10 MG/ML
1 SUSPENSION/ DROPS OPHTHALMIC 4 TIMES DAILY
Qty: 5 ML | Refills: 0 | Status: SHIPPED | OUTPATIENT
Start: 2024-10-25 | End: 2024-10-30

## 2024-10-25 RX ADMIN — APRACLONIDINE HYDROCHLORIDE OPHTHALMIC SOLUTION 1 DROP: 10 SOLUTION/ DROPS OPHTHALMIC at 09:21

## 2024-10-25 ASSESSMENT — REFRACTION_WEARINGRX
OS_CYLINDER: +1.25
OD_CYLINDER: +1.25
OS_AXIS: 160
OD_CYLINDER: +1.25
OS_SPHERE: PLANO
OS_CYLINDER: +1.25
OS_SPHERE: PLANO
OD_SPHERE: -0.50
OD_ADD: +2.50
OS_AXIS: 160
OD_SPHERE: +2.00
OD_CYLINDER: +1.25
OD_SPHERE: -0.50
OD_ADD: +2.50
OD_AXIS: 017
SPECS_TYPE: SVL/BF/PAL
OS_ADD: +2.50
OS_SPHERE: +2.50
OD_AXIS: 017
OD_AXIS: 017
OS_CYLINDER: +1.25
OD_CYLINDER: +1.25
OS_SPHERE: +2.50
OS_AXIS: 160
OS_AXIS: 160
OD_AXIS: 017
OS_CYLINDER: +1.25
OS_ADD: +2.50
SPECS_TYPE: SVL/BF/PAL
OD_SPHERE: +2.00

## 2024-10-25 ASSESSMENT — VISUAL ACUITY
OS_PH_CC+: -1
OS_CC+: +2
OD_CC: 20/40
OD_CC+: -2
OD_PH_CC: 20/30
CORRECTION_TYPE: GLASSES
METHOD: SNELLEN - LINEAR
OS_PH_CC: 20/30
OS_CC: 20/40

## 2024-10-25 ASSESSMENT — TONOMETRY
OD_IOP_MMHG: 11
OS_IOP_MMHG: 11
OD_IOP_MMHG: 11
IOP_METHOD: TONOPEN
IOP_METHOD: TONOPEN

## 2024-10-25 NOTE — PROGRESS NOTES
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     Here for YAG capsulotomy right eye today    Relevant Past Medical/Family/Social History:    T2DM, lung CA s/p partial resection, history smoking    Relevant Review of Systems: Negative except as noted in HPI     Diagnosis: Glaucoma suspect each eye   Onset / Date of Diagnosis:   Previous glaucoma surgery/laser:    - OD: CE / IOL (Neelaidi, 2023)   - OS: CE / IOL (Neelaidi, 2023)  Maximum intraocular pressure 21 / 24  Current Meds: none   Family history: positive; father    / 569  Gonio: open each eye   Refractive status: myope  Trauma history: negative  Steroid exposure: positive; COPD  Vasospastic disease (e.g. Migraines / Raynaud's phenomenon): positive  Hx of hemodynamic crisis or Low BP: negative  Meds AEs/intolerance:  PMHx: positive for COPD; no Cardiac/Renal/Kidney stones/Sulfa Allergy  Anticoagulants: Eliquis 5 mg qd  Today's testing:  Visual field October 8, 2024:   - OD: generalized depression with scattered defects and relative central sparing; MD -8.0; reliable  - OS: generalized depression with scattered defects and relative central sparing; MD -9.5; reliable   OCT RNFL October 8, 2024  - OD: no thinning; G 109  - OS: no thinning; G 113    Additional Ocular History:     # Pseudophakia each eye   PCO each eye     # Dry Eye Syndrome each eye     # Myopia each eye     Plan/Recommendations:  Discussed findings with patient.  PCO each eye, likely visually significant, recommend YAG capsulotomy right eye first   Risk and benefit of YAG capsulotomy discussed with the patient, wishes to proceed   YAG capsulotomy performed to right eye today 10/25/24, refer to procedure note, no complications.   Start Prednisolone QID for 5 days right eye     Schedule YAG capsulotomy left eye       Physician  Attestation     Attending Physician Attestation:  Complete documentation of historical and exam elements from today's encounter can be found in the full encounter summary report (not reduplicated in this progress note). I personally obtained the chief complaint(s) and history of present illness. I confirmed and edited as necessary the review of systems, past medical/surgical history, family history, social history, and examination findings as documented by others; and I examined the patient myself. I personally reviewed the relevant tests, images, and reports as documented above. I formulated and edited as necessary the assessment and plan and discussed the findings and management plan with the patient and any family members present at the time of the visit.  Neda Covarrubias M.D., Glaucoma, October 25, 2024       Physician (Cardiology)     Reviewed and updated this visit:  Tobacco  Allergies  Meds  Problems  Med Hx  Surg Hx  Soc Hx  Fam Hx      AWV- one yr  F/U 3 months

## 2024-10-25 NOTE — PROGRESS NOTES
Type of Form Received: [id number]    Form Received (Date) 10/25/24   Form Filled out No   Placed in provider folder Yes

## 2024-10-25 NOTE — NURSING NOTE
Chief Complaints and History of Present Illnesses   Patient presents with    Follow Up     Follow up for PCO right eye and YAG Capsulotomy right eye today.     Patient states vision is stable each eye in the last few weeks. Patient started Restasis twice daily in both eyes. Patient states compliant with eye drops.     ABDULLAHI Kim 8:18 AM 10/25/2024       Chief Complaint(s) and History of Present Illness(es)       Follow Up              Laterality: right eye    Onset: weeks ago    Quality: blurred vision    Course: stable    Associated symptoms: dryness (dry and irritated).  Negative for glare, haloes, eye pain, flashes and floaters    Treatments tried: eye drops, artificial tears and glasses    Pain scale: 0/10    Comments: Follow up for PCO right eye and YAG Capsulotomy right eye today.     Patient states vision is stable each eye in the last few weeks. Patient started Restasis twice daily in both eyes. Patient states compliant with eye drops.     ABDULLAHI Kim 8:18 AM 10/25/2024

## 2024-10-28 ENCOUNTER — DOCUMENTATION ONLY (OUTPATIENT)
Dept: INTERNAL MEDICINE | Facility: CLINIC | Age: 69
End: 2024-10-28
Payer: COMMERCIAL

## 2024-10-28 NOTE — PROGRESS NOTES
Type of Form Received: [id number]    Form Received (Date) 10/25/24   Form Filled out No   Placed in provider folder Yes

## 2024-10-29 ENCOUNTER — MEDICAL CORRESPONDENCE (OUTPATIENT)
Dept: HEALTH INFORMATION MANAGEMENT | Facility: CLINIC | Age: 69
End: 2024-10-29
Payer: COMMERCIAL

## 2024-10-30 ENCOUNTER — OFFICE VISIT (OUTPATIENT)
Dept: OPHTHALMOLOGY | Facility: CLINIC | Age: 69
End: 2024-10-30
Attending: OPHTHALMOLOGY
Payer: COMMERCIAL

## 2024-10-30 DIAGNOSIS — I48.0 PAROXYSMAL ATRIAL FIBRILLATION (H): ICD-10-CM

## 2024-10-30 DIAGNOSIS — E11.65 TYPE 2 DIABETES MELLITUS WITH HYPERGLYCEMIA, WITHOUT LONG-TERM CURRENT USE OF INSULIN (H): Primary | ICD-10-CM

## 2024-10-30 DIAGNOSIS — H26.493 PCO (POSTERIOR CAPSULAR OPACIFICATION), BILATERAL: Primary | ICD-10-CM

## 2024-10-30 PROCEDURE — 66821 AFTER CATARACT LASER SURGERY: CPT | Mod: LT | Performed by: OPHTHALMOLOGY

## 2024-10-30 ASSESSMENT — TONOMETRY
IOP_METHOD: TONOPEN
OD_IOP_MMHG: 12
OS_IOP_MMHG: 10
OS_IOP_MMHG: 11
OS_IOP_MMHG: 10
IOP_METHOD: APPLANATION
IOP_METHOD: TONOPEN

## 2024-10-30 ASSESSMENT — REFRACTION_WEARINGRX
OD_CYLINDER: +1.25
OS_AXIS: 160
OD_SPHERE: -0.50
OS_SPHERE: PLANO
OD_AXIS: 017
OS_CYLINDER: +1.25
OD_ADD: +2.50
OS_ADD: +2.50
SPECS_TYPE: SVL

## 2024-10-30 ASSESSMENT — VISUAL ACUITY
CORRECTION_TYPE: GLASSES
METHOD: SNELLEN - LINEAR
OS_CC: 20/30
OS_CC+: -2
OD_CC: 20/20

## 2024-10-30 NOTE — TELEPHONE ENCOUNTER
[facility] Call Center    Phone Message    May a detailed message be left on voicemail: yes     Reason for Call: Other: Pt is requesting a refill on her apixaban ANTICOAGULANT (ELIQUIS) 5 MG tablet medication as well as on her needles for her insulin. Please advise. d      Action Taken: Other: PCC    Travel Screening: Not Applicable     Date of Service: 10/30/24

## 2024-10-30 NOTE — TELEPHONE ENCOUNTER
"    Medication Requested:   Disp Refills Start End MARIXA   apixaban ANTICOAGULANT (ELIQUIS) 5 MG tablet -- -- 8/19/2024 -- No   Sig - Route: Take 1 tablet (5 mg) by mouth 2 times daily - Oral   Class: Transitional     Insulin needles  ----------------------  Last Office Visit : 9/26/2024  [facility] [place]  Future Office visit:  0  ----------------------      [x]Medication unable to be refilled by RN due to criteria not met as indicated below:     - Eliquis last prescribed inpatient, script \"Transitional\"  - Insulin needles not active on med list and dont see them in previous orders         [] Eligibility - Pt not seen within past _ months, no future appointment       []Supervision: no future appointment; >30 days before next appointment       [] Compliance - lapse in therapy/gap in refills; No Shows; Cancellations       [] Verification - order discrepancy, clarification needed, modification needed       [] Controlled medication -        [] Medication not included in refill protocol policy       [] Abnormal labs/test:        [] Overdue labs/test:         [] 90 day basilio refill given with reminder to complete:         [] Basilio refill given x1, Pt did not follow-up, pended to care team for decision       [x] Medication not active on Pt's med list       [] Drug interaction Warning       [] Medication is Reported/Historical       [] > 30-day supply request       []Advanced refill request: > 7 days before refill date       []Review: new med; med adjusted <= 30 days; safety alert; requires lab monitoring       [x]Other:        "

## 2024-10-30 NOTE — PROGRESS NOTES
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     Here for YAG capsulotomy left eye today    Relevant Past Medical/Family/Social History:    T2DM, lung CA s/p partial resection, history smoking    Relevant Review of Systems: Negative except as noted in HPI     Diagnosis: Glaucoma suspect each eye   Onset / Date of Diagnosis:   Previous glaucoma surgery/laser:    - OD: CE / IOL (Neelaidi, 2023)   - OS: CE / IOL (Neelaidi, 2023)  Maximum intraocular pressure 21 / 24  Current Meds: none   Family history: positive; father    / 569  Gonio: open each eye   Refractive status: myope  Trauma history: negative  Steroid exposure: positive; COPD  Vasospastic disease (e.g. Migraines / Raynaud's phenomenon): positive  Hx of hemodynamic crisis or Low BP: negative  Meds AEs/intolerance:  PMHx: positive for COPD; no Cardiac/Renal/Kidney stones/Sulfa Allergy  Anticoagulants: Eliquis 5 mg qd  Today's testing:  Visual field October 8, 2024:   - OD: generalized depression with scattered defects and relative central sparing; MD -8.0; reliable  - OS: generalized depression with scattered defects and relative central sparing; MD -9.5; reliable   OCT RNFL October 8, 2024  - OD: no thinning; G 109  - OS: no thinning; G 113    Additional Ocular History:     # Pseudophakia each eye   PCO each eye     # Dry Eye Syndrome each eye     # Myopia each eye     Plan/Recommendations:  Discussed findings with patient.  PCO each eye, likely visually significant, recommend YAG capsulotomy  Risk and benefit of YAG capsulotomy discussed with the patient, wishes to proceed   YAG capsulotomy performed to left eye today, refer to procedure note, no complications.   Start Prednisolone QID for 5 days left eye    RTC in2 weeks with Dr. Trujillo for dilated exam and new glasses prescription.        Physician Attestation     Attending Physician Attestation:  Complete documentation of historical and exam elements from today's encounter can be found in the full encounter summary report (not reduplicated in this progress note). I personally obtained the chief complaint(s) and history of present illness. I confirmed and edited as necessary the review of systems, past medical/surgical history, family history, social history, and examination findings as documented by others; and I examined the patient myself. I personally reviewed the relevant tests, images, and reports as documented above. I formulated and edited as necessary the assessment and plan and discussed the findings and management plan with the patient and any family members present at the time of the visit.  Neda Covarrubias M.D., Glaucoma, October 30, 2024

## 2024-10-31 ENCOUNTER — DOCUMENTATION ONLY (OUTPATIENT)
Dept: INTERNAL MEDICINE | Facility: CLINIC | Age: 69
End: 2024-10-31
Payer: COMMERCIAL

## 2024-10-31 NOTE — PROGRESS NOTES
Type of Form Received: [facility] 81645, [id number]    Form Received (Date) 10/30/24   Form Filled out Yes faxed 11/11/24   Placed in provider folder Yes

## 2024-11-01 ASSESSMENT — EXTERNAL EXAM - LEFT EYE: OS_EXAM: NORMAL

## 2024-11-01 ASSESSMENT — EXTERNAL EXAM - RIGHT EYE: OD_EXAM: NORMAL

## 2024-11-01 ASSESSMENT — SLIT LAMP EXAM - LIDS
COMMENTS: WNL
COMMENTS: WNL

## 2024-11-07 ENCOUNTER — MEDICAL CORRESPONDENCE (OUTPATIENT)
Dept: INTERNAL MEDICINE | Facility: CLINIC | Age: 69
End: 2024-11-07
Payer: COMMERCIAL

## 2024-11-10 ENCOUNTER — TELEPHONE (OUTPATIENT)
Dept: OPHTHALMOLOGY | Facility: CLINIC | Age: 69
End: 2024-11-10
Payer: COMMERCIAL

## 2024-11-10 ENCOUNTER — NURSE TRIAGE (OUTPATIENT)
Dept: NURSING | Facility: CLINIC | Age: 69
End: 2024-11-10
Payer: COMMERCIAL

## 2024-11-10 NOTE — TELEPHONE ENCOUNTER
"Nurse Triage SBAR    Is this a 2nd Level Triage? No - On Call Provider paged to return call directly     Situation/Background: Patient is calling with concern of a \"scratching feeling\" in left eye. Symptoms started on Friday, 11/08/24.     CTC not on file. Pt gave verbal permission to speak with daughter, .     Assessment:   Left eye has excruciating pain.     Recommendation: Per disposition, Call Surgeon now. Reviewed Care Advice with pain and verbalizes understanding. Declines additional questions. Advised patient to call back with any new or worsening symptoms. Patient verbalized understanding and agrees with plan. Patient assisted with paging On-Call Surgeon directly to patient.     Protocol Recommended Disposition: Telephone advice - on call Provider paged to return call directly to patient.     Yudelka Peña RN on 11/10/2024 at 3:53 PM  [facility]  Reason for Disposition   [1] Caller has URGENT question AND [2] triager unable to answer question    Additional Information   Negative: Sounds like a life-threatening emergency to the triager   Negative: Chest pain   Negative: Difficulty breathing   Negative: Acting confused (e.g., disoriented, slurred speech) or excessively sleepy   Negative: Post-Op tonsil and adenoid surgery, symptoms or questions about   Negative: Surgical incision symptoms and questions   Negative: [1] Pain or burning with passing urine (urination) AND [2] male   Negative: [1] Pain or burning with passing urine (urination) AND [2] female   Negative: Constipation   Negative: New or worsening leg (calf, thigh) pain   Negative: New or worsening leg swelling   Negative: Dizziness is severe, or persists > 24 hours after surgery   Negative: Pain, redness, swelling, or pus at IV Site   Negative: Symptoms arising from use of a urinary catheter (e.g., Coude, Andrade)   Negative: Cast problems or questions   Negative: Medication question   Negative: [1] Widespread rash AND [2] " bright red, sunburn-like   Negative: [1] SEVERE headache AND [2] after spinal (epidural) anesthesia   Negative: [1] Vomiting AND [2] persists > 4 hours   Negative: [1] Vomiting AND [2] abdomen looks much more swollen than usual   Negative: [1] Drinking very little AND [2] dehydration suspected (e.g., no urine > 12 hours, very dry mouth, very lightheaded)   Negative: Patient sounds very sick or weak to the triager   Negative: Sounds like a serious complication to the triager   Negative: Fever > 100.4 F (38.0 C)   Negative: [1] SEVERE post-op pain (e.g., excruciating, pain scale 8-10) AND [2] not controlled with pain medications    Protocols used: Post-Op Symptoms and [id number]

## 2024-11-10 NOTE — TELEPHONE ENCOUNTER
I received a page regarding Ms. Aparicio. She states that after stopping prednisolone about one week she developed irritation and photophobia in her left eye which is getting worse. She is s/p YAG capsulotomy in the left eye on 10/30. She has an upcoming appointment with Dr. Trujillo on 11/21. We discussed that this may be surface related or perhaps due to eye inflammation after stopping her steroid. I offered to have our schedulers call to have her seen in clinic this week and she is agreeable.    Pan Reese MD  PGY-2 Resident  Department of Ophthalmology  November 10, 2024 4:32 PM

## 2024-11-11 ENCOUNTER — TELEPHONE (OUTPATIENT)
Dept: OPHTHALMOLOGY | Facility: CLINIC | Age: 69
End: 2024-11-11
Payer: COMMERCIAL

## 2024-11-11 NOTE — TELEPHONE ENCOUNTER
an we get follow up VT with Ilir this week? Patient is supposed to see him on 11/21 but seems to be having post op inflammation s/p YAG     ---      Above per on call eye provider.    Note to  to assist in scheduling with Dr. Trujillo this Tuesday/November 12th or Thursday/November 14th.    Post op time slot  S/p YAG cap-- concern of post-op inflammation per on call eye provider.    Reginaldo Coronel RN 7:28 AM 11/11/24

## 2024-11-11 NOTE — TELEPHONE ENCOUNTER
Lvm/sent mychart msg for Per krys msg, lvm/sent mychart msg to schedule w/Dr. Trujillo this Tuesday/November 12th or Thursday/November 14th..Post op time slot forS/p YAG cap-- concern of post-op inflammation per on call eye provider..jam

## 2024-11-12 ENCOUNTER — TRANSFERRED RECORDS (OUTPATIENT)
Dept: HEALTH INFORMATION MANAGEMENT | Facility: CLINIC | Age: 69
End: 2024-11-12
Payer: COMMERCIAL

## 2024-11-12 ENCOUNTER — TELEPHONE (OUTPATIENT)
Dept: OPHTHALMOLOGY | Facility: CLINIC | Age: 69
End: 2024-11-12
Payer: COMMERCIAL

## 2024-11-12 NOTE — TELEPHONE ENCOUNTER
Lvm msg for Per krys msg, lvm/sent serena msg to schedule w/Dr. Trujillo this Tuesday/November 12th or Thursday/November 14th..Post op time slot forS/p YAG cap-- concern of post-op inflammation per on call eye provider..ls11/12/24

## 2024-11-14 ENCOUNTER — TELEPHONE (OUTPATIENT)
Dept: PHYSICAL MEDICINE AND REHAB | Facility: CLINIC | Age: 69
End: 2024-11-14
Payer: COMMERCIAL

## 2024-11-14 NOTE — TELEPHONE ENCOUNTER
[facility] Call Center    Phone Message    May a detailed message be left on voicemail: yes     Reason for Call: Other: Patient called in and stated she has some questions regarding medications and her upcoming procedure. Patient states she was put on some steroids and hasn't started taking them as she is unsure if it will interfere with her procedure with Nataliya. Please call to advise.      Action Taken: Message routed to:  [facility] PMR    Travel Screening: Not Applicable     Date of Service:

## 2024-11-14 NOTE — TELEPHONE ENCOUNTER
Bilateral L4-5 transforaminal epidural steroid injection scheduled 11/18/24 with Dr. [facility].     Called patient back to get more information from her regarding the prednisone she has ordered.     Patient reports she has had left ankle/foot swelling for a few weeks. She saw [facility] she believes on 11/12/24 and was prescribed a Prednisone burst for possible gout after they ruled out any fractures. Denies fever. Still has active swelling, pain, difficulty walking. She states no labs were drawn. She additionally reports that she normally monitors her diabetes with a Dexcom 7 monitor that she places on her stomach that needs to be replaced every 10 days. She reports that she did not replace the Dexcom monitor several days ago (more than 3 days ago, can't remember what date exactly) when it was due to be swapped out as she states she thought for some reason she was not supposed to have it installed with the procedure due to it being on her stomach. Asked patient if she has a different monitor to test her blood sugar levels and she denies that she has one of those any longer, has not for a couple of months, she states she has not been testing her BGS for the past 3 days. Last A1C was completed 9/30/24 was 7.6; last blood sugar on the Dexcom monitor was 70 but she does not have a date on the monitor when she is looking at it.     Called patient back after Dr. An reviewed information. He recommends that patient she is she may have the Dexcom monitor placed on the back of her arm for the next 10 day period so she can make sure to have that back in place to be safely monitoring her blood sugars, that if she can have it temporarily placed on her arm then she can keep it on for the procedure. He states patient should start her oral Prednisone prescription she was given for her gout to get that started and hopefully helping  with her left foot symptoms, and he would plan to still do the injection on 11/18/24. He also advised that if her blood sugar on the day of the procedure is over 250 when they check it at the [facility] prior to her procedure that they would likely need to cancel and reschedule her procedure. Reviewed procedure information and advised that the [facility] nurses did also send her a My Chart message reviewing her arrival and day of injection information.     Patient verbalized understanding and had no further questions or concerns.    SILVESTRE RodriguezN RN  RN Care Coordinator for Physical Medicine and Rehabilitation  [facility] [address] [address]  Office: [phone]  Fax: [phone]

## 2024-11-14 NOTE — PATIENT INSTRUCTIONS
Your urine and wet prep tests are negative.  Follow-up with your primary care provider should symptoms persist.   Tetracycline Pregnancy And Lactation Text: This medication is Pregnancy Category D and not consider safe during pregnancy. It is also excreted in breast milk.

## 2024-11-18 ENCOUNTER — HOSPITAL ENCOUNTER (OUTPATIENT)
Facility: AMBULATORY SURGERY CENTER | Age: 69
Discharge: HOME | End: 2024-11-18
Attending: PHYSICAL MEDICINE & REHABILITATION | Admitting: PHYSICAL MEDICINE & REHABILITATION
Payer: COMMERCIAL

## 2024-11-18 ENCOUNTER — ANCILLARY PROCEDURE (OUTPATIENT)
Dept: RADIOLOGY | Facility: AMBULATORY SURGERY CENTER | Age: 69
End: 2024-11-18
Attending: PHYSICAL MEDICINE & REHABILITATION
Payer: COMMERCIAL

## 2024-11-18 VITALS
TEMPERATURE: 98.2 F | DIASTOLIC BLOOD PRESSURE: 92 MMHG | OXYGEN SATURATION: 97 % | WEIGHT: 189 LBS | SYSTOLIC BLOOD PRESSURE: 132 MMHG | BODY MASS INDEX: 30.37 KG/M2 | RESPIRATION RATE: 16 BRPM | HEIGHT: 66 IN | HEART RATE: 112 BPM

## 2024-11-18 DIAGNOSIS — M54.16 LUMBAR RADICULOPATHY: ICD-10-CM

## 2024-11-18 LAB — GLUCOSE BLDC GLUCOMTR-MCNC: 167 MG/DL (ref 70–99)

## 2024-11-18 PROCEDURE — 64483 NJX AA&/STRD TFRM EPI L/S 1: CPT | Mod: RT

## 2024-11-18 PROCEDURE — 82962 GLUCOSE BLOOD TEST: CPT | Performed by: PATHOLOGY

## 2024-11-18 RX ORDER — LIDOCAINE HYDROCHLORIDE 10 MG/ML
INJECTION, SOLUTION EPIDURAL; INFILTRATION; INTRACAUDAL; PERINEURAL DAILY PRN
Status: DISCONTINUED | OUTPATIENT
Start: 2024-11-18 | End: 2024-11-18 | Stop reason: HOSPADM

## 2024-11-18 RX ORDER — DEXAMETHASONE SODIUM PHOSPHATE 10 MG/ML
INJECTION INTRAMUSCULAR; INTRAVENOUS DAILY PRN
Status: DISCONTINUED | OUTPATIENT
Start: 2024-11-18 | End: 2024-11-18 | Stop reason: HOSPADM

## 2024-11-18 RX ORDER — IOPAMIDOL 408 MG/ML
INJECTION, SOLUTION INTRATHECAL DAILY PRN
Status: DISCONTINUED | OUTPATIENT
Start: 2024-11-18 | End: 2024-11-18 | Stop reason: HOSPADM

## 2024-11-18 NOTE — OP NOTE
PROCEDURE NOTE: Lumbar Transforaminal Epidural Steroid Injection Under Fluoroscopic Guidance    PROCEDURE DATE: 11/18/2024    PATIENT NAME: Jenn Aparicio   YOB: 1955    ATTENDING PHYSICIAN:  Bony An MD   RESIDENT/FELLOW PHYSICIAN: None    PREOPERATIVE DIAGNOSIS:   Lumbar radiculopathy   POSTOPERATIVE DIAGNOSIS: same    PROCEDURE PERFORMED: Bilateral Lumbar Transforaminal Epidural Steroid Injection at the L4-5 level(s)    FLUOROSCOPY WAS USED.    INDICATIONS FOR PROCEDURE:   Jenn Aparicio is a 69 year old female with a clinical picture consistent with the above-mentioned diagnosis, resulting in radicular pain to the Bilateral lower extremities.    08/14/24 MRI Lumbar spine  IMPRESSION:  1. Stable to decreased conspicuity of previously biopsy-proven mildly  inflamed Schmorl node in the superior endplate of L2 endplates. No  evidence of metastatic disease in the lumbar spine. No leptomeningeal  enhancement.  2. Multilevel degenerative findings as detailed above. Most  significantly there is grade 1 anterolisthesis, degenerative disc  disease and facet arthropathy at L4-5 resulting in moderate spinal  canal stenosis and moderate right neural foraminal stenosis. The  anterior epidural venous plexus is prominent at this level, likely due  to anterolisthesis and spinal canal narrowing.    PROCEDURE AND FINDINGS:    She was greeted in the pre-procedure holding area. The risk, benefits and alternatives to the procedure were again reviewed with the patient and written informed consent was placed in the chart. An IV line was not placed.  A 500 mL bag of NS was not connected to the patient. She was taken to the procedure room and positioned prone on the fluoroscopy table.  Routine monitors were applied including EKG leads (with sedation only), blood pressure cuff, and pulse oximetry. Prior to the procedure a time out was completed, verifying correct patient, procedure, site, positioning, and implants and/or  special equipment.     An AP fluoroscopic  film was taken to identify the L4 pedicle and the L5 superior articulating process. The skin was prepped with chlorhexidine and draped in the usual sterile fashion. The skin and subcutaneous tissue overlying the aforementioned anatomic targets were anesthetized using a 27-gauge 1-1/4 inch needle with 1% preservative-free lidocaine for a total volume of  4 mls.      Then a 22-gauge 5 inch Quincke spinal needle was advanced under fluoroscopic guidance using an oblique view just inferior to the pedicle of the L4 level(s) on the Bilateral side(s).  Then, utilizing AP and lateral fluoroscopic views, we confirmed the position of the needle tip to be within the neural foramen. After negative aspiration, 1 mls of Isovue-M contrast was injected under AP view at each level and confirmed adequate spread along the nerve root and in the epidural space. There was no evidence of intravascular uptake or intrathecal spread on imaging.     At this point, after negative aspiration, a total 1.5mL volume of treatment injectate, consisting of 0.75mL Dexamethasone (10mg/mL) (15mg total dose; 5mg wasted), and 0.75mL of 1% Lidocaine, was injected easily, per level.  The needle was then flushed with 0.5 ml of local anesthetic and removed. The needle insertion site was dressed appropriately.     Jenn Aparicio was taken to the recovery room where she was monitored for a brief period of time. She tolerated the procedure well and was discharged home in stable condition with post procedural instructions.     Before the procedure, she reported a pain score of 10/10.   After the procedure, she reported a pain score of 0/10.      Follow-up will be with referring neurosurgery clinic    COMPLICATIONS:  None    Comment(s):  None

## 2024-11-18 NOTE — DISCHARGE INSTRUCTIONS

## 2024-11-21 ENCOUNTER — DOCUMENTATION ONLY (OUTPATIENT)
Dept: INTERNAL MEDICINE | Facility: CLINIC | Age: 69
End: 2024-11-21
Payer: COMMERCIAL

## 2024-11-21 ENCOUNTER — OFFICE VISIT (OUTPATIENT)
Dept: OPHTHALMOLOGY | Facility: CLINIC | Age: 69
End: 2024-11-21
Attending: OPHTHALMOLOGY
Payer: COMMERCIAL

## 2024-11-21 DIAGNOSIS — H04.123 DRY EYE SYNDROME OF BOTH EYES: ICD-10-CM

## 2024-11-21 DIAGNOSIS — H26.493 PCO (POSTERIOR CAPSULAR OPACIFICATION), BILATERAL: Primary | ICD-10-CM

## 2024-11-21 DIAGNOSIS — J44.9 CHRONIC OBSTRUCTIVE PULMONARY DISEASE, UNSPECIFIED COPD TYPE (H): Primary | ICD-10-CM

## 2024-11-21 DIAGNOSIS — Z96.1 PSEUDOPHAKIA, BOTH EYES: ICD-10-CM

## 2024-11-21 ASSESSMENT — REFRACTION_WEARINGRX
SPECS_TYPE: SVL
OS_ADD: +2.50
OS_CYLINDER: +1.25
OD_ADD: +2.50
OD_CYLINDER: +1.25
OD_AXIS: 017
OS_SPHERE: PLANO
OS_AXIS: 160
OD_SPHERE: -0.50

## 2024-11-21 ASSESSMENT — TONOMETRY
OD_IOP_MMHG: 14
IOP_METHOD: APPLANATION
OS_IOP_MMHG: 13
OD_IOP_MMHG: 14
OS_IOP_MMHG: 13
IOP_METHOD: TONOPEN

## 2024-11-21 ASSESSMENT — CONF VISUAL FIELD
OD_SUPERIOR_NASAL_RESTRICTION: 0
OS_NORMAL: 1
OS_INFERIOR_TEMPORAL_RESTRICTION: 0
OS_INFERIOR_NASAL_RESTRICTION: 0
OD_INFERIOR_NASAL_RESTRICTION: 0
OD_SUPERIOR_TEMPORAL_RESTRICTION: 0
OS_SUPERIOR_TEMPORAL_RESTRICTION: 0
OD_INFERIOR_TEMPORAL_RESTRICTION: 0
OS_SUPERIOR_NASAL_RESTRICTION: 0
OD_NORMAL: 1
METHOD: COUNTING FINGERS

## 2024-11-21 ASSESSMENT — CUP TO DISC RATIO
OD_RATIO: 0.45
OS_RATIO: 0.4

## 2024-11-21 ASSESSMENT — REFRACTION_MANIFEST
OS_CYLINDER: +1.25
OS_ADD: +2.50
OD_CYLINDER: +1.25
OD_AXIS: 010
OS_AXIS: 165
OS_SPHERE: PLANO
OD_SPHERE: -0.50
OD_ADD: +2.50

## 2024-11-21 ASSESSMENT — VISUAL ACUITY
OD_CC: 20/20
OD_CC+: -2
OS_CC+: +2
OS_CC: 20/25
METHOD: -1
CORRECTION_TYPE: GLASSES

## 2024-11-21 ASSESSMENT — EXTERNAL EXAM - RIGHT EYE: OD_EXAM: NORMAL

## 2024-11-21 ASSESSMENT — SLIT LAMP EXAM - LIDS
COMMENTS: WNL
COMMENTS: WNL

## 2024-11-21 ASSESSMENT — EXTERNAL EXAM - LEFT EYE: OS_EXAM: NORMAL

## 2024-11-21 NOTE — NURSING NOTE
Chief Complaints and History of Present Illnesses   Patient presents with    Follow Up     Chief Complaint(s) and History of Present Illness(es)       Follow Up              Laterality: both eyes    Onset: months ago    Quality: States the va is doing well      Associated symptoms: floaters.  Negative for dryness, redness, tearing, photophobia and flashes    Treatments tried: artificial tears    Pain scale: 0/10              Comments    Post yag  States FBS after discontinue the prednisolone for about a week.   AT VIVIANA Ornelas COT 9:04 AM November 21, 2024

## 2024-11-21 NOTE — PROGRESS NOTES
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     Today, 11/21/2024, patient is 4 weeks s/p YAG 10/30/2024 on left eye. s/p YAG 10/25/2024 on right eye. Notices improvement after YAG.     Relevant Past Medical/Family/Social History:    T2DM, lung CA s/p partial resection, history smoking  Relevant Review of Systems: Negative except as noted in HPI     Diagnosis: Glaucoma suspect each eye   Onset / Date of Diagnosis:   Previous glaucoma surgery/laser:    - OD: CE / IOL ([facility], 2023), s/p YAG 10/25/2024   - OS: CE / IOL ([facility], 2023), s/p YAG 10/30/2024  Maximum intraocular pressure 21 / 24  Current Meds: none   Family history: positive; father    / 569  Gonio: open each eye   Refractive status: myope  Trauma history: negative  Steroid exposure: positive; COPD  Vasospastic disease (e.g. Migraines / Raynaud's phenomenon): positive  Hx of hemodynamic crisis or Low BP: negative  Meds AEs/intolerance:  PMHx: positive for COPD; no Cardiac/Renal/Kidney stones/Sulfa Allergy  Anticoagulants: Eliquis 5 mg qd  Today's testing:  Visual field October 8, 2024:   - OD: generalized depression with scattered defects and relative central sparing; MD -8.0; reliable  - OS: generalized depression with scattered defects and relative central sparing; MD -9.5; reliable   OCT RNFL October 8, 2024  - OD: no thinning; G 109  - OS: no thinning; G 113    Additional Ocular History:   # Pseudophakia each eye     # Dry Eye Syndrome each eye     # Myopia each eye     Plan/Recommendations:  Discussed findings with patient.  Good Post-operative appearance from YAG.  Happy with current vision.  MRx similar to current glasses Rx.  Discussed the importance of yearly CEE.     RTC for yearly CEE.    Physician Attestation     Attending Physician Attestation:  Complete documentation  of Complete documentation of historical and exam elements from today's encounter can be found in the full encounter summary report (not reduplicated in this progress note). I personally obtained the chief complaint(s) and history of present illness. I confirmed and edited as necessary the review of systems, past medical/surgical history, family history, social history, and examination findings as document by others; and I examined the patient myself. I personally reviewed the relevant tests, images, and reports as documented above. I formulated and edited as necessary the assessment and plan and discussed the findings and management plan with the patient and family.    Abdelrahman Trujillo OD

## 2024-11-21 NOTE — PATIENT INSTRUCTIONS
Continue Artificial tears 4 to 6 times daily in both eyes    Lianet Clinton from [facility] called back.  She can be reached at: [phone]

## 2024-11-26 DIAGNOSIS — G89.29 OTHER CHRONIC PAIN: ICD-10-CM

## 2024-11-26 RX ORDER — DULOXETIN HYDROCHLORIDE 60 MG/1
60 CAPSULE, DELAYED RELEASE ORAL 2 TIMES DAILY
Qty: 60 CAPSULE | Refills: 3 | Status: SHIPPED | OUTPATIENT
Start: 2024-11-26

## 2024-11-26 NOTE — TELEPHONE ENCOUNTER
Duloxetine 60mg capsule  Last prescribing provider: Dr. Zarina Leung     Last clinic visit date: 10/2/24    Recommendations for requested medication (if none, N/A): NA    Any other pertinent information (if none, N/A): fax request    Refilled: Y/N, if NO, why?

## 2024-12-19 ENCOUNTER — ONCOLOGY VISIT (OUTPATIENT)
Dept: ONCOLOGY | Facility: CLINIC | Age: 69
End: 2024-12-19
Attending: INTERNAL MEDICINE
Payer: COMMERCIAL

## 2024-12-19 ENCOUNTER — ANCILLARY PROCEDURE (OUTPATIENT)
Dept: CT IMAGING | Facility: CLINIC | Age: 69
End: 2024-12-19
Attending: INTERNAL MEDICINE
Payer: COMMERCIAL

## 2024-12-19 ENCOUNTER — LAB (OUTPATIENT)
Dept: LAB | Facility: CLINIC | Age: 69
End: 2024-12-19
Attending: INTERNAL MEDICINE
Payer: COMMERCIAL

## 2024-12-19 VITALS
HEART RATE: 110 BPM | SYSTOLIC BLOOD PRESSURE: 157 MMHG | BODY MASS INDEX: 29.23 KG/M2 | TEMPERATURE: 98.2 F | WEIGHT: 181.1 LBS | RESPIRATION RATE: 20 BRPM | OXYGEN SATURATION: 90 % | DIASTOLIC BLOOD PRESSURE: 77 MMHG

## 2024-12-19 DIAGNOSIS — C34.31 MALIGNANT NEOPLASM OF LOWER LOBE OF RIGHT LUNG (H): ICD-10-CM

## 2024-12-19 DIAGNOSIS — C34.31 MALIGNANT NEOPLASM OF LOWER LOBE OF RIGHT LUNG (H): Primary | ICD-10-CM

## 2024-12-19 LAB
ALBUMIN SERPL BCG-MCNC: 3.8 G/DL (ref 3.5–5.2)
ALP SERPL-CCNC: 100 U/L (ref 40–150)
ALT SERPL W P-5'-P-CCNC: 6 U/L (ref 0–50)
ANION GAP SERPL CALCULATED.3IONS-SCNC: 10 MMOL/L (ref 7–15)
AST SERPL W P-5'-P-CCNC: 15 U/L (ref 0–45)
BASOPHILS # BLD AUTO: 0.1 10E3/UL (ref 0–0.2)
BASOPHILS NFR BLD AUTO: 1 %
BILIRUB SERPL-MCNC: 0.3 MG/DL
BUN SERPL-MCNC: 5.1 MG/DL (ref 8–23)
CALCIUM SERPL-MCNC: 9.4 MG/DL (ref 8.8–10.4)
CHLORIDE SERPL-SCNC: 99 MMOL/L (ref 98–107)
CREAT BLD-MCNC: 0.5 MG/DL (ref 0.5–1)
CREAT SERPL-MCNC: 0.5 MG/DL (ref 0.51–0.95)
EGFRCR SERPLBLD CKD-EPI 2021: >60 ML/MIN/1.73M2
EGFRCR SERPLBLD CKD-EPI 2021: >90 ML/MIN/1.73M2
EOSINOPHIL # BLD AUTO: 0.6 10E3/UL (ref 0–0.7)
EOSINOPHIL NFR BLD AUTO: 7 %
ERYTHROCYTE [DISTWIDTH] IN BLOOD BY AUTOMATED COUNT: 13.6 % (ref 10–15)
GLUCOSE SERPL-MCNC: 199 MG/DL (ref 70–99)
HCO3 SERPL-SCNC: 35 MMOL/L (ref 22–29)
HCT VFR BLD AUTO: 44.6 % (ref 35–47)
HGB BLD-MCNC: 13.9 G/DL (ref 11.7–15.7)
IMM GRANULOCYTES # BLD: 0 10E3/UL
IMM GRANULOCYTES NFR BLD: 0 %
LYMPHOCYTES # BLD AUTO: 1.7 10E3/UL (ref 0.8–5.3)
LYMPHOCYTES NFR BLD AUTO: 22 %
MCH RBC QN AUTO: 27 PG (ref 26.5–33)
MCHC RBC AUTO-ENTMCNC: 31.2 G/DL (ref 31.5–36.5)
MCV RBC AUTO: 87 FL (ref 78–100)
MONOCYTES # BLD AUTO: 0.4 10E3/UL (ref 0–1.3)
MONOCYTES NFR BLD AUTO: 6 %
NEUTROPHILS # BLD AUTO: 5.1 10E3/UL (ref 1.6–8.3)
NEUTROPHILS NFR BLD AUTO: 65 %
NRBC # BLD AUTO: 0 10E3/UL
NRBC BLD AUTO-RTO: 0 /100
PLATELET # BLD AUTO: 251 10E3/UL (ref 150–450)
POTASSIUM SERPL-SCNC: 3.4 MMOL/L (ref 3.4–5.3)
PROT SERPL-MCNC: 7.1 G/DL (ref 6.4–8.3)
RBC # BLD AUTO: 5.15 10E6/UL (ref 3.8–5.2)
SODIUM SERPL-SCNC: 144 MMOL/L (ref 135–145)
WBC # BLD AUTO: 7.9 10E3/UL (ref 4–11)

## 2024-12-19 PROCEDURE — 71260 CT THORAX DX C+: CPT | Performed by: RADIOLOGY

## 2024-12-19 PROCEDURE — 80053 COMPREHEN METABOLIC PANEL: CPT | Performed by: PATHOLOGY

## 2024-12-19 PROCEDURE — 74160 CT ABDOMEN W/CONTRAST: CPT | Performed by: RADIOLOGY

## 2024-12-19 PROCEDURE — 85025 COMPLETE CBC W/AUTO DIFF WBC: CPT | Performed by: PATHOLOGY

## 2024-12-19 PROCEDURE — G0463 HOSPITAL OUTPT CLINIC VISIT: HCPCS | Performed by: INTERNAL MEDICINE

## 2024-12-19 PROCEDURE — 36415 COLL VENOUS BLD VENIPUNCTURE: CPT | Performed by: PATHOLOGY

## 2024-12-19 RX ORDER — OXYCODONE HYDROCHLORIDE 5 MG/1
5 CAPSULE ORAL EVERY 4 HOURS PRN
COMMUNITY

## 2024-12-19 RX ORDER — PREDNISONE 5 MG/1
TABLET ORAL
COMMUNITY
Start: 2024-11-12

## 2024-12-19 RX ORDER — IOPAMIDOL 755 MG/ML
93 INJECTION, SOLUTION INTRAVASCULAR ONCE
Status: COMPLETED | OUTPATIENT
Start: 2024-12-19 | End: 2024-12-19

## 2024-12-19 RX ADMIN — IOPAMIDOL 93 ML: 755 INJECTION, SOLUTION INTRAVASCULAR at 09:01

## 2024-12-19 ASSESSMENT — PAIN SCALES - GENERAL: PAINLEVEL_OUTOF10: EXTREME PAIN (9)

## 2024-12-19 NOTE — PROGRESS NOTES
[facility] CANCER CLINIC  [address] [facility] [address]  Phone: [phone]  Fax: [phone]    PATIENT NAME: Jenn Aparicio  MRN # [id number]   DATE OF VISIT: December 19, 2024  YOB: 1955     CANCER TYPE: Lung adenocarcinoma  STAGE: IA2 [id number] poorly diff adeno RLL, then [id number] IA2 suspected NSCLC RUL, medically inoperable      ECOG PS: 2 (COPD)     PD-L1: N/A  Lung panel: N/A  NGS: N/A    ASSESSMENT AND PLAN  NSCLC, adeno, RUL, L2 lesion: See extensive prior notes but in summary, RUL looks a little worse in Nov. I think it looks a little bit better on the current CT. There's a new spiculated nodule a little more medial and a little higher in the RUL this time, but given the acuity of its development, it's more likely to be a mucus plug. Report not back yet. I will call her by the end of the day tomorrow if anything changes. CT chest abd in 3 months.      Hemoptysis: Resolved. While we still have to monitor airway, it probably was truly driven by being on dual anticoagulation.     Weight loss: Continues. She thinks related to acute on chronic back pain leading to decreased appetite and more difficulty preparing food, getting around, etc. Monitor closely. Not related to Jardiance - she's been on that for a couple of years.      LBP: Exacerbated. Per Dr. Nettles. Recent injection didn't help, unfortunately.      COPD: O2 dependent and intermittent exacerbations. Continues to see Pulmonologist regularly.     Remainder of issues not actively discussed today  DM2: Seeing Dr. Shabazz in Endocrine  Deconditioning: From last visit, getting a little worse over the last 6 months, but not in a position to do PT and not homebound and is stable since last visit.  Dysphagia: Met with Dr. Briseno in GI 7/18/23, has had pretty extensive evaluation before, last discussed in 2023.  Peripheral neuropathy: Mostly driven by DM     The longitudinal plan of care for the condition(s)  below were addressed during this visit. Due to the added complexity in care, I will continue to support Jenn in the subsequent management of this condition(s) and with the ongoing continuity of care of this condition(s): adenocarcinoma of the lung       30 minutes spent by me on the date of the encounter doing chart review, review of test results, interpretation of tests, patient visit, documentation, orders     Zarina Leung MD  Associate Professor of Medicine  Hematology, Oncology and Transplantation    SUBJECTIVE  Jenn returns for close interim follow up of lung adeno and the R peripheral lung lesion that looked a little worse on the last CT   Back pain exacerbated.   Making it very difficult to get around.   Appetite remains down  Hemoptysis resolved    CANCER SUMMARY  12/24/15 PET/CT  1/13/15 CT lung bx. Adenocarcinoma  2/8/16 R thoracotomy, RLL lobectomy (Dr. Cunha). Adenocarcinoma, 1.1 cm, assoicated with atypical adenomatous hyperplasia  10/18/19 CTA for shortness of breath. New 1.3 cm RUL nodule  11/2019 PET/CT. 1.1 cm RUL hypermetabolic nodule (SUV 12.6)  12/26/19 Brain MRI negative for mets  1/14~1/28/20 SBRT to RUL nodule, 5000 cGy, every other day, 1000 cGy (Dr. Currie at [facility]). No bx due to O2 dependence and too much risk  8/19/20 First visit with me   11/29/21 CT chest. New 10 mm RUL nodule  1/11/22 CT chest. New 7.2 mm RUL nodule, unchanged RUL nodules  3/31/22 CT chest. New RUL nodule from Jan 2022 7-->10 mm, new 5 mm ROBERT nodule  5/20~5/24/22 [facility] for COPD exacerbation.   6/24/22 CT chest non contrast.   8/16/22 EGD. 3 cm hiatal hernia, o/w normal  8/31/22 CT chest. 2.5 x 1.3 cm R midlung subpleural nodularity (4:98) previously 2.3 x 1.1 cm, slightly increased solid component   10/5/22 PET/CT. 2 x 1.3 cm irregular opacity posterior RUL (SUV 2.46), 2.4 x 0.8 cm linear opacity (SUV 4.76); there was bronchiectasia on prior imaging. Stable 1.1 x 0.8 cm non FDG avid RUL opacity and unchanged 4 mm  posterior RUL nodule. No adenopathy. Inferior right breast uptake (SUV 6.09) likely infectious or inflammatory.   2/3~2/9/23 [facility] for COPD exacerbation  2/4/23 CTA during hospitalization. No significant change in 2.3 x 1.5 cm spiculated nodule in the right upper lobe (series 7 image 94) with surrounding linear parenchymal opacities and subtle nodularity  2/20~2/24/23 [facility] for COPD exacerbation.   3/1/23 CT chest non contrast. 2.3 cm posterior RUL irregular nodule unchanged from 12/1/22 however slightly increased soft tissue component compared to 10/5/22 PET. Stable ROBERT mucous plugging with micronodularity.  3/22/23 CT chest. Stable compared to 3/1/23  5/25/23 CT chest abd w/contrast. Unchanged 1.3 x 0.5 cm R apical nodule, unchnaged 2.4 x 1.3 cm R upper nodular consolidation. New nodular/masslike area of consolidation anteriorly and laterally measuring 3.3 x 1.4 cm, not present on CT 3/22/23. No adenopathy. Consider PET or close surveillance CT in 3 months  7/19/23 Bronch, EBUS (Dr. Garcia). 11L negative, scant cells. 4L unsatisfactory for eval. 8 negative, scant cellularity, 4R unsatisfactory, 11R negative, scan cellularity, RUL negative, showed acute inflammation, limited by scant cellularity.  5/15/24 CT CAP. Slightly enlarging RUL consolidation, several new indeterminate RML nodules, other stable nodules, new L2 lytic lesion  6/11/24 PET. Poor quality and low sensitivity due to hyperglycemia. New 0.8 x 1.0 cm R apical nodule (SUV 3.1), interval decreased size/uptake of patchy RUL GGO (SUV 2.4, previously 6.1). Few scattered nodules in both upper lobes with near complete resolution of FDG avidity, mild uptake associated with the lucent focus along the superior endplate of L2 (SUV 3.8).  6/26/24 MRI L spine. T1/2 hypointense, STIR hyperintense and enhancing lytic lesion L2 c/w mets. Mod neural foramenal steonosis bilaterally L3- and R L4-5.  7/22/24 L2 bx (IR). Path: negative for malignancy, showed fragments of  benign bone and marrow tissue  8/14~8/26/2024 [facility] for A-fib with RVR, acute on chronic lower back pain, DARIO, new LLE weakness and numbness    PAST MEDICAL HISTORY  Lung adenocarcinoma  DM2 with neuropathy  HCV IA, undetectable in 2019, treated  COPD. O2 dependent since late 2018. PFT 11/26/19. FEV1 0.64 (30%), FVC 1.69 (63%), DLCOunc 9.8 (38%).  HTN  H/o ITP  H/o disk herniation  Ankle surgery  Median neuropathy R  H/o sessile colon polyps 2014, h/o adenomas before that. Colonoscopy 2/7/18 @ [facility]. Sessile serated adenoma x 1, tubular adenoma x 3  H/o chronic LBP  Dyslipidemia  Cataracts     CURRENT OUTPATIENT MEDICATIONS  Reviewed    ALLERGIES  Allergies   Allergen Reactions    Aspirin      325mg     Bee Venom Anaphylaxis    Penicillins Hives    Acetaminophen GI Disturbance    Azithromycin Dizziness    Colon Care     Interferons Dermatitis    Metformin GI Disturbance      PHYSICAL EXAM  BP (!) 157/77 (BP Location: Right arm, Patient Position: Sitting, Cuff Size: Adult Regular)   Pulse 110   Temp 98.2  F (36.8  C) (Oral)   Resp 20   Wt 82.1 kg (181 lb 1.6 oz)   SpO2 90%   BMI 29.23 kg/m    GEN: NAD  HEENT: EOMI, no icterus, injection or pallor  NEURO: alert  SKIN: no rashes    LABORATORY AND IMAGING STUDIES    Labs were independently reviewed and interpreted by me  CMP and CBC pd were nl    CT chest abd pesonally reviewed and interpreted by me.  I think the two small ROBERT nodules look unchanged since at least May 2024  No mediastinal adenopathy that looks worrisome  The RUL actually looks slightly better

## 2024-12-19 NOTE — NURSING NOTE
"Oncology Rooming Note    December 19, 2024 11:13 AM   Jenn Aparicio is a 69 year old female who presents for:    Chief Complaint   Patient presents with    Oncology Clinic Visit     Malignant neoplasm of lower lobe of right lung      Initial Vitals: BP (!) 157/77 (BP Location: Right arm, Patient Position: Sitting, Cuff Size: Adult Regular)   Pulse 110   Temp 98.2  F (36.8  C) (Oral)   Resp 20   Wt 82.1 kg (181 lb 1.6 oz)   SpO2 90%   BMI 29.23 kg/m   Estimated body mass index is 29.23 kg/m  as calculated from the following:    Height as of 11/18/24: 1.676 m (5' 6\").    Weight as of this encounter: 82.1 kg (181 lb 1.6 oz). Body surface area is 1.96 meters squared.  Extreme Pain (9) Comment: Data Unavailable   No LMP recorded. Patient is postmenopausal.  Allergies reviewed: Yes  Medications reviewed: Yes    Medications: MEDICATION REFILLS NEEDED TODAY. Provider was notified.  Pharmacy name entered into EPIC:    [facility] - [place], [place] - [address] [facility] [facility] [address]  [facility] - [place], [place] - [address] [place] [address] [id number] [place], [place] - [address] [facility] [place], [place] - [address] [place] [address] [address]  [id number] - [place], [place] - [address] AT [facility] [address]    Frailty Screening:   Is the patient here for a new oncology consult visit in cancer care? 2. No      Clinical concerns: Needs albuterol inhaler, glucose sensors needs refills       Radha Carson              "

## 2024-12-19 NOTE — LETTER
12/19/2024      Jenn Aparicio  [address]  [place] [address]      Dear Colleague,    Thank you for referring your patient, Jenn Aparicio, to the [facility] CANCER CLINIC. Please see a copy of my visit note below.        [facility] CANCER CLINIC  [address] [address]  Phone: [phone]  Fax: [phone]    PATIENT NAME: Jenn Aparicio  MRN # [id number]   DATE OF VISIT: December 19, 2024  YOB: 1955     CANCER TYPE: Lung adenocarcinoma  STAGE: IA2 [id number] poorly diff adeno RLL, then [id number] IA2 suspected NSCLC RUL, medically inoperable      ECOG PS: 2 (COPD)     PD-L1: N/A  Lung panel: N/A  NGS: N/A    ASSESSMENT AND PLAN  NSCLC, adeno, RUL, L2 lesion: See extensive prior notes but in summary, RUL looks a little worse in Nov. I think it looks a little bit better on the current CT. There's a new spiculated nodule a little more medial and a little higher in the RUL this time, but given the acuity of its development, it's more likely to be a mucus plug. Report not back yet. I will call her by the end of the day tomorrow if anything changes. CT chest abd in 3 months.      Hemoptysis: Resolved. While we still have to monitor airway, it probably was truly driven by being on dual anticoagulation.     Weight loss: Continues. She thinks related to acute on chronic back pain leading to decreased appetite and more difficulty preparing food, getting around, etc. Monitor closely. Not related to Jardiance - she's been on that for a couple of years.      LBP: Exacerbated. Per Dr. Nettles. Recent injection didn't help, unfortunately.      COPD: O2 dependent and intermittent exacerbations. Continues to see Pulmonologist regularly.     Remainder of issues not actively discussed today  DM2: Seeing Dr. Shabazz in Endocrine  Deconditioning: From last visit, getting a little worse over the last 6 months, but not in a position to do PT and not homebound  and is stable since last visit.  Dysphagia: Met with Dr. Briseno in GI 7/18/23, has had pretty extensive evaluation before, last discussed in 2023.  Peripheral neuropathy: Mostly driven by DM     The longitudinal plan of care for the condition(s) below were addressed during this visit. Due to the added complexity in care, I will continue to support Jenn in the subsequent management of this condition(s) and with the ongoing continuity of care of this condition(s): adenocarcinoma of the lung       30 minutes spent by me on the date of the encounter doing chart review, review of test results, interpretation of tests, patient visit, documentation, orders     Zarina Leung MD  Associate Professor of Medicine  Hematology, Oncology and Transplantation    SUBJECTIVE  Jenn returns for close interim follow up of lung adeno and the R peripheral lung lesion that looked a little worse on the last CT   Back pain exacerbated.   Making it very difficult to get around.   Appetite remains down  Hemoptysis resolved    CANCER SUMMARY  12/24/15 PET/CT  1/13/15 CT lung bx. Adenocarcinoma  2/8/16 R thoracotomy, RLL lobectomy (Dr. Cunha). Adenocarcinoma, 1.1 cm, assoicated with atypical adenomatous hyperplasia  10/18/19 CTA for shortness of breath. New 1.3 cm RUL nodule  11/2019 PET/CT. 1.1 cm RUL hypermetabolic nodule (SUV 12.6)  12/26/19 Brain MRI negative for mets  1/14~1/28/20 SBRT to RUL nodule, 5000 cGy, every other day, 1000 cGy (Dr. Currie at [facility]). No bx due to O2 dependence and too much risk  8/19/20 First visit with me   11/29/21 CT chest. New 10 mm RUL nodule  1/11/22 CT chest. New 7.2 mm RUL nodule, unchanged RUL nodules  3/31/22 CT chest. New RUL nodule from Jan 2022 7-->10 mm, new 5 mm ROBERT nodule  5/20~5/24/22 [facility] for COPD exacerbation.   6/24/22 CT chest non contrast.   8/16/22 EGD. 3 cm hiatal hernia, o/w normal  8/31/22 CT chest. 2.5 x 1.3 cm R midlung subpleural nodularity (4:98) previously 2.3 x 1.1 cm, slightly  increased solid component   10/5/22 PET/CT. 2 x 1.3 cm irregular opacity posterior RUL (SUV 2.46), 2.4 x 0.8 cm linear opacity (SUV 4.76); there was bronchiectasia on prior imaging. Stable 1.1 x 0.8 cm non FDG avid RUL opacity and unchanged 4 mm posterior RUL nodule. No adenopathy. Inferior right breast uptake (SUV 6.09) likely infectious or inflammatory.   2/3~2/9/23 [facility] for COPD exacerbation  2/4/23 CTA during hospitalization. No significant change in 2.3 x 1.5 cm spiculated nodule in the right upper lobe (series 7 image 94) with surrounding linear parenchymal opacities and subtle nodularity  2/20~2/24/23 [facility] for COPD exacerbation.   3/1/23 CT chest non contrast. 2.3 cm posterior RUL irregular nodule unchanged from 12/1/22 however slightly increased soft tissue component compared to 10/5/22 PET. Stable ROBERT mucous plugging with micronodularity.  3/22/23 CT chest. Stable compared to 3/1/23  5/25/23 CT chest abd w/contrast. Unchanged 1.3 x 0.5 cm R apical nodule, unchnaged 2.4 x 1.3 cm R upper nodular consolidation. New nodular/masslike area of consolidation anteriorly and laterally measuring 3.3 x 1.4 cm, not present on CT 3/22/23. No adenopathy. Consider PET or close surveillance CT in 3 months  7/19/23 Bronch, EBUS (Dr. Garcia). 11L negative, scant cells. 4L unsatisfactory for eval. 8 negative, scant cellularity, 4R unsatisfactory, 11R negative, scan cellularity, RUL negative, showed acute inflammation, limited by scant cellularity.  5/15/24 CT CAP. Slightly enlarging RUL consolidation, several new indeterminate RML nodules, other stable nodules, new L2 lytic lesion  6/11/24 PET. Poor quality and low sensitivity due to hyperglycemia. New 0.8 x 1.0 cm R apical nodule (SUV 3.1), interval decreased size/uptake of patchy RUL GGO (SUV 2.4, previously 6.1). Few scattered nodules in both upper lobes with near complete resolution of FDG avidity, mild uptake associated with the lucent focus along the superior endplate of  L2 (SUV 3.8).  6/26/24 MRI L spine. T1/2 hypointense, STIR hyperintense and enhancing lytic lesion L2 c/w mets. Mod neural foramenal steonosis bilaterally L3- and R L4-5.  7/22/24 L2 bx (IR). Path: negative for malignancy, showed fragments of benign bone and marrow tissue  8/14~8/26/2024 [facility] for A-fib with RVR, acute on chronic lower back pain, DARIO, new LLE weakness and numbness    PAST MEDICAL HISTORY  Lung adenocarcinoma  DM2 with neuropathy  HCV IA, undetectable in 2019, treated  COPD. O2 dependent since late 2018. PFT 11/26/19. FEV1 0.64 (30%), FVC 1.69 (63%), DLCOunc 9.8 (38%).  HTN  H/o ITP  H/o disk herniation  Ankle surgery  Median neuropathy R  H/o sessile colon polyps 2014, h/o adenomas before that. Colonoscopy 2/7/18 @ [facility]. Sessile serated adenoma x 1, tubular adenoma x 3  H/o chronic LBP  Dyslipidemia  Cataracts     CURRENT OUTPATIENT MEDICATIONS  Reviewed    ALLERGIES  Allergies   Allergen Reactions     Aspirin      325mg      Bee Venom Anaphylaxis     Penicillins Hives     Acetaminophen GI Disturbance     Azithromycin Dizziness     Colon Care      Interferons Dermatitis     Metformin GI Disturbance      PHYSICAL EXAM  BP (!) 157/77 (BP Location: Right arm, Patient Position: Sitting, Cuff Size: Adult Regular)   Pulse 110   Temp 98.2  F (36.8  C) (Oral)   Resp 20   Wt 82.1 kg (181 lb 1.6 oz)   SpO2 90%   BMI 29.23 kg/m    GEN: NAD  HEENT: EOMI, no icterus, injection or pallor  NEURO: alert  SKIN: no rashes    LABORATORY AND IMAGING STUDIES    Labs were independently reviewed and interpreted by me  CMP and CBC pd were nl    CT chest abd pesonally reviewed and interpreted by me.  I think the two small ROBERT nodules look unchanged since at least May 2024  No mediastinal adenopathy that looks worrisome  The RUL actually looks slightly better               Again, thank you for allowing me to participate in the care of your patient.        Sincerely,        Zarina Leung MD

## 2024-12-19 NOTE — DISCHARGE INSTRUCTIONS

## 2024-12-22 ENCOUNTER — HEALTH MAINTENANCE LETTER (OUTPATIENT)
Age: 69
End: 2024-12-22

## 2025-01-02 ENCOUNTER — TELEPHONE (OUTPATIENT)
Dept: INTERNAL MEDICINE | Facility: CLINIC | Age: 70
End: 2025-01-02

## 2025-01-02 NOTE — TELEPHONE ENCOUNTER
[facility] Call Center    Phone Message    May a detailed message be left on voicemail: yes     Reason for Call: Other: Per pt would like to know if she can just get some pain medications or if she would need to see the pain clinic? Per pt has some pain on her back and legs. Please call pt back. Thank you.      Action Taken: Message routed to:  [facility] ([facility]    Travel Screening: Not Applicable     Date of Service:

## 2025-01-03 NOTE — TELEPHONE ENCOUNTER
Left Voicemail (1st Attempt) and Sent Mychart (1st Attempt) for the patient to call back and schedule the following:    Appointment type: Return   Provider: PCP  Return date: Next available  Specialty phone number: [phone]

## 2025-01-06 NOTE — TELEPHONE ENCOUNTER
Patient confirmed scheduled appointment:  Date: Jan 16th   Time: 6pm  Visit type: Return   Provider: PCP  Location: [facility]

## 2025-01-16 ENCOUNTER — OFFICE VISIT (OUTPATIENT)
Dept: INTERNAL MEDICINE | Facility: CLINIC | Age: 70
End: 2025-01-16
Payer: COMMERCIAL

## 2025-01-16 VITALS
OXYGEN SATURATION: 90 % | BODY MASS INDEX: 26.78 KG/M2 | DIASTOLIC BLOOD PRESSURE: 82 MMHG | HEIGHT: 66 IN | RESPIRATION RATE: 16 BRPM | WEIGHT: 166.6 LBS | HEART RATE: 107 BPM | SYSTOLIC BLOOD PRESSURE: 166 MMHG

## 2025-01-16 DIAGNOSIS — Z23 NEEDS FLU SHOT: ICD-10-CM

## 2025-01-16 DIAGNOSIS — G89.29 CHRONIC LEFT-SIDED LOW BACK PAIN WITH LEFT-SIDED SCIATICA: ICD-10-CM

## 2025-01-16 DIAGNOSIS — J44.9 CHRONIC OBSTRUCTIVE PULMONARY DISEASE, UNSPECIFIED COPD TYPE (H): ICD-10-CM

## 2025-01-16 DIAGNOSIS — L30.8 OTHER ECZEMA: ICD-10-CM

## 2025-01-16 DIAGNOSIS — E11.65 TYPE 2 DIABETES MELLITUS WITH HYPERGLYCEMIA, WITHOUT LONG-TERM CURRENT USE OF INSULIN (H): ICD-10-CM

## 2025-01-16 DIAGNOSIS — Z78.0 ASYMPTOMATIC POSTMENOPAUSAL STATUS: ICD-10-CM

## 2025-01-16 DIAGNOSIS — Z13.21 ENCOUNTER FOR VITAMIN DEFICIENCY SCREENING: ICD-10-CM

## 2025-01-16 DIAGNOSIS — E11.42 DIABETIC POLYNEUROPATHY ASSOCIATED WITH TYPE 2 DIABETES MELLITUS (H): ICD-10-CM

## 2025-01-16 DIAGNOSIS — M54.42 CHRONIC LEFT-SIDED LOW BACK PAIN WITH LEFT-SIDED SCIATICA: ICD-10-CM

## 2025-01-16 DIAGNOSIS — R79.89 LOW VITAMIN D LEVEL: ICD-10-CM

## 2025-01-16 DIAGNOSIS — Z12.11 SCREEN FOR COLON CANCER: Primary | ICD-10-CM

## 2025-01-16 RX ORDER — CLOBETASOL PROPIONATE 0.5 MG/G
OINTMENT TOPICAL 2 TIMES DAILY
Qty: 15 G | Refills: 2 | Status: SHIPPED | OUTPATIENT
Start: 2025-01-16

## 2025-01-16 RX ORDER — ROFLUMILAST 500 UG/1
500 TABLET ORAL EVERY MORNING
Qty: 90 TABLET | Refills: 3 | Status: SHIPPED | OUTPATIENT
Start: 2025-01-16

## 2025-01-16 RX ORDER — TRAMADOL HYDROCHLORIDE 50 MG/1
50 TABLET ORAL EVERY 6 HOURS PRN
Qty: 50 TABLET | Refills: 0 | Status: SHIPPED | OUTPATIENT
Start: 2025-01-16

## 2025-01-16 RX ORDER — FAMOTIDINE 20 MG
25 TABLET ORAL DAILY
Qty: 200 CAPSULE | Refills: 1 | Status: SHIPPED | OUTPATIENT
Start: 2025-01-16

## 2025-01-16 RX ORDER — ACYCLOVIR 400 MG/1
TABLET ORAL
Qty: 3 EACH | Refills: 5 | Status: SHIPPED | OUTPATIENT
Start: 2025-01-16

## 2025-01-16 RX ORDER — ALBUTEROL SULFATE 90 UG/1
INHALANT RESPIRATORY (INHALATION)
Qty: 17 G | Refills: 11 | Status: SHIPPED | OUTPATIENT
Start: 2025-01-16

## 2025-01-16 RX ORDER — FLUTICASONE FUROATE, UMECLIDINIUM BROMIDE AND VILANTEROL TRIFENATATE 200; 62.5; 25 UG/1; UG/1; UG/1
POWDER RESPIRATORY (INHALATION)
Qty: 28 EACH | Refills: 5 | Status: SHIPPED | OUTPATIENT
Start: 2025-01-16

## 2025-01-17 NOTE — PROGRESS NOTES
Assessment & Plan       Type 2 diabetes mellitus with hyperglycemia, without long-term current use of insulin (H)  - empagliflozin (JARDIANCE) 25 MG TABS tablet; Take 1 tablet (25 mg) by mouth every morning.  - Continuous Glucose Sensor (DEXCOM G7 SENSOR) [facility]; Change every 10 days.  She has f/u appt in Diabetes on 1/22/25, 4/15/25, and 7/24/25.    Needs flu shot  - INFLUENZA HIGH DOSE, TRIVALENT, PF (FLUZONE)    Other eczema  - clobetasol (TEMOVATE) 0.05 % external ointment; Apply topically 2 times daily.    Chronic obstructive pulmonary disease, unspecified COPD type (H)  - Fluticasone-Umeclidin-Vilanterol (TRELEGY ELLIPTA) 200-62.5-25 MCG/ACT oral inhaler; USE 1 INHALATION DAILY  - roflumilast (DALIRESP) 500 MCG TABS tablet; Take 1 tablet (500 mcg) by mouth every morning.  - albuterol (PROAIR HFA/PROVENTIL HFA/VENTOLIN HFA) 108 (90 Base) MCG/ACT inhaler; USE 1 OR 2 INHALATIONS EVERY 4 HOURS AS NEEDED    Chronic left-sided low back pain with left-sided sciatica  - Adult Neurosurgery  Referral; Future  - traMADol (ULTRAM) 50 MG tablet; Take 1 tablet (50 mg) by mouth every 6 hours as needed for severe pain.    Low vitamin D level  - Vitamin D, Cholecalciferol, 25 MCG (1000 UT) CAPS; Take 25 mcg by mouth daily.    I spent a total of 50 minutes in the care of this pt during today's office visit. This time includes reviewing the patient's chart and prior history, obtaining a history, performing an examination and evaluation and counseling the patient. This time also includes ordering medications or tests necessary in addition to communication to other member's of the patient's health care team. Time spent in documentation and care coordination is included.     Mitzi CHRIS Gaytan is a 69 year old, presenting for the following health issues:  Pain (Pt states she hasn't been able to seen at Pain Clinic yet. Requesting pain medication. ), Diabetes (Pt reports high blood sugar,  hasn't been able to reduce it. Hasn't been about to get some of her diabetic medication and inhaler from [facility] for about 1 month. ), Back Pain, Refill Request, and Derm Problem (Rash behind right ear - burns, itchy, dry skin)      1/16/2025     6:02 PM   Additional Questions   Roomed by loreto garcia        Jenn is a 69 year old that presents in clinic today for the following:     Chief Complaint   Patient presents with    Pain     Pt states she hasn't been able to seen at Pain Clinic yet. Requesting pain medication.     Diabetes     Pt reports high blood sugar, hasn't been able to reduce it. Hasn't been about to get some of her diabetic medication and inhaler from [facility] for about 1 month.     Back Pain    Refill Request    Derm Problem     Rash behind right ear - burns, itchy, dry skin     Patient Active Problem List   Diagnosis    Postmenopausal bleeding    Cervical stenosis (uterine cervix)    Pulmonary emphysema (H)    Type 2 diabetes mellitus with stage 3 chronic kidney disease, without long-term current use of insulin, unspecified whether stage 3a or 3b CKD (H)    Primary lung adenocarcinoma (H) STAGE: IA2 [id number] poorly diff adeno RLL, then [id number] IA2 suspected NSCLC RUL, medically inoperable     Chronic obstructive pulmonary disease, unspecified COPD type (H)    Diabetic neuropathy (H)    Hypoxia    Chest pain, unspecified type    Hypokalemia    Gastroesophageal reflux disease without esophagitis    Esophageal dysphagia    Combined forms of age-related cataract of both eyes    Class 2 severe obesity due to excess calories with serious comorbidity in adult (H)    Acute left ankle pain    Chronic respiratory failure with hypoxia (H)    Chronic left-sided low back pain with left-sided sciatica    Lumbar radiculopathy           1/16/2025     6:02 PM   Additional Questions   Roomed by loreto garcia     Screenings from encounters over the past 10 days    No data recorded     Jenn Aparicio is a 69 year old female who  "presents for   medication refills.  [facility] has not been able to deliver her Trelogy, her Jardiance,  or glargine.[facility]        Her glucose levels have been uncontrolled dure to the lack of meds.    2.  She has severe left sided low back pain with sciatic pain down the left   leg. She had steroid injections 11/18/24 which did not help.  She had some oxycodone. She has pain with rising from a chair, and when walking. She is taking Cymbalta. According to the procedure not follow-up would consist of referral to Neuro-surgery clinic.    3.  She has chest congestion and has wheezing. She would like to have her Trelogy medication as she has been using her albuterol inhaler.    4. She has a patch of dry itchy skin behind her right ear.  Wondering if it is eczema. Her daughter and grandchild have eczema.          Objective    BP (!) 166/82 (BP Location: Right arm, Patient Position: Sitting, Cuff Size: Adult Regular)   Pulse 107   Resp 16   Ht 1.676 m (5' 5.98\")   Wt 75.6 kg (166 lb 9.6 oz)   SpO2 90%   BMI 26.90 kg/m    Body mass index is 26.9 kg/m .  Physical Exam   GENERAL: alert and no distress  EYES: Eyes grossly normal to inspection, conjunctivae and sclerae normal  RESP: lungs  decreased breath sounds and mild wheezing in the bilateral lower lobes.   CV: regular rate and rhythm, normal S1 S2, no S3 or S4, no murmur, click or rub, no peripheral edema  MS: no gross musculoskeletal defects noted, no edema  SKIN: She has a red, roughened patch behind the right ear measuring approximately 4 x 5 cm.  NEURO: mentation intact and speech normal  PSYCH: mentation appears normal, affect normal/bright            Signed Electronically by: BENOIT Ruiz CNP    "

## 2025-01-27 ENCOUNTER — DOCUMENTATION ONLY (OUTPATIENT)
Dept: INTERNAL MEDICINE | Facility: CLINIC | Age: 70
End: 2025-01-27
Payer: COMMERCIAL

## 2025-01-27 NOTE — PROGRESS NOTES
Type of Form Received: [facility] 1/17-3/17 0357    Form Received (Date) 1/27/25   Form Filled out No   Placed in provider folder Yes

## 2025-02-12 DIAGNOSIS — Z53.9 DIAGNOSIS NOT YET DEFINED: Primary | ICD-10-CM

## 2025-03-22 ENCOUNTER — HEALTH MAINTENANCE LETTER (OUTPATIENT)
Age: 70
End: 2025-03-22

## 2025-03-31 NOTE — PROGRESS NOTES
Endocrinology Clinic Visit 1/1/2025    NAME:  Jenn Aparicio  PCP:  Mitzi Nettles  MRN:  [id number]  Reason for Consult:  Follow up visit for diabetes management  Requesting Provider:  No ref. provider found        History of Present Illness:     Jenn Aparicio is a 69 year old yo female with PMHx of RLL adenocarcinoma s/p lobectomy (2/2016), severe COPD (FEV1 0.55, Z-4.42 4/2023) with acute exacerbation, chronic hypoxemic hypercapnic respiratory failure (3-4L O2, pVCO2 55-60 baseline), HT is here for diabetes follow up visit.    Duration of Diabetes:between 15-20 years  -She is uncertain when she was diagnosed with diabetes: Progress Notes as far back as 2008 mention diagnosis of type 2 diabetes.  She previously was seen in endocrinology at [facility].    Hospitalization:None    Previous Diabetes medications:  Metformin, developed diarrhea, has tried both extended release and short acting formulations  -Januvia appeared on medication list previously, does not recall side effect with this.  Treated with Ozempic, discontinued in 2023 due to nausea, headache and muscle cramping.  -Insulin, recalls developing hypoglycemia prompting discontinuation  -Glipizide was prescribed but discontinued soon thereafter due to hypoglycemia.   -Mounjaro 5 mg weekly--there was 1 month lapse in treatment with Mounjaro when she was transitioning from 2.5 mg to 5 mg dose; she just took second dose of Mounjaro 5 mg weekly.  Has some nausea with eating. Used it for few weeks and the stopped because abdominal upset and loss of appetite.      Today:  She is losing weight, not wanting to eat, having sweats day and night.  She has a heat rash on her neck that start 2-3 months ago. Today she hasn't eaten at all.  She brought a banana with and Boost in hopes would want  to eat, but hasn't.  No longer going out to eat as can't eat much.  Full with a banana.  Knows not enough so making herself eat.  Thinks at night BG high as really trying  to eat.  Meats taste horrible to her.  Recently didn't eat at all and slept for three days.  She reports fatigue and sweats for the last 2 to 3 months.  She has had decreased appetite since trying GLP-1 agonists, but the early satiety is possibly getting worse.  She denies melena or hematochezia and is due for colon cancer screening but has reluctant to do this she is wondering if she can be sedated for it, ideally unconscious.    Appetite really not back since tried Trulicity Ozempic and Mounjaro.      She has been taking glargine every night, she is rarely taking her NovoLog as she reports that she feels uneasy with her blood sugar in the 150s or even 160s at times.  She is really only taking the 5 units with meals if her blood sugar is greater than 180 before the meal so she is mostly not taking it.  There are several days where she does not take NovoLog at all.      A1c today is 8.8%.    Current treatment strategy:  Jardiance 25 mg daily  Novolog 5 units with meals and night if she eats - only takes if BG >180 premeal.    Glargine 6 units every other night 9-10 pm.      CGMS data:  We reviewed this together.  Average blood sugar is 177 and correlates to a GMI of 7.5%.  Time in range is 57% with no hypoglycemia.      We do note 1 instance of a low blood sugar shortly after midnight.  She reported that she does have candy at her bedside and was easily able to recognize and treat this she did feel weak and shaky and hot, though as noted feeling hot and sweaty frequently.      Per review of records, he has had diabetes 15-20 years ago  Diabetes Complication Screening:  -Hypoglycemia: YES, very rarely. Has hypoglycemia awareness.  -Retinopathy: NO. Last Eye Exam: April 2024. Patient receives eye care Internal  -Nephropathy: No. Creatinine 0.5 and GFR>90  -Neuropathy: Diabetic neuropathy.  On Lyrica. Last foot exam:1/2/25  -BP: At Goal. On losartan 50.  -ASCVD: NO. 11/27/23LDL Measured 77  On atorvastatin 10  mg.  -ASA: NO    Current Outpatient Medications   Medication Sig Dispense Refill    acetylcysteine (MUCOMYST) 10 % nebulizer solution Inhale 4 mLs into the lungs 4 times daily as needed for mucolysis/respiratory distress 30 mL 5    albuterol (PROAIR HFA/PROVENTIL HFA/VENTOLIN HFA) 108 (90 Base) MCG/ACT inhaler USE 1 OR 2 INHALATIONS EVERY 4 HOURS AS NEEDED 17 g 11    albuterol (PROVENTIL) (2.5 MG/3ML) 0.083% neb solution Take 1 vial (2.5 mg) by nebulization 4 times daily 360 mL 5    alpha-lipoic acid 600 MG capsule Take 1 capsule (600 mg) by mouth daily 90 capsule 3    apixaban ANTICOAGULANT (ELIQUIS) 5 MG tablet Take 1 tablet (5 mg) by mouth 2 times daily. 180 tablet 1    atorvastatin (LIPITOR) 10 MG tablet Take 1 tablet (10 mg) by mouth daily 90 tablet 3    carboxymethylcellulose (REFRESH LIQUIGEL) 1 % ophthalmic solution Place 1 drop into both eyes 4 times daily 15 mL 11    cetirizine (ZYRTEC) 10 MG tablet Take 1 tablet (10 mg) by mouth every morning. 90 tablet 1    clobetasol (TEMOVATE) 0.05 % external ointment Apply topically 2 times daily. 15 g 2    Continuous Glucose Sensor (DEXCOM G7 SENSOR) MISC Change every 10 days. 3 each 5    cyanocobalamin (VITAMIN B-12) 500 MCG tablet Take 1 tablet (500 mcg) by mouth daily 90 tablet 3    cyclobenzaprine (FLEXERIL) 5 MG tablet Take 1 tablet (5 mg) by mouth 2 times daily as needed for muscle spasms. 60 tablet 0    cycloSPORINE (RESTASIS) 0.05 % ophthalmic emulsion Place 1 drop into both eyes 2 times daily. 60 each 11    DULoxetine (CYMBALTA) 60 MG capsule Take 1 capsule (60 mg) by mouth 2 times daily. 60 capsule 3    empagliflozin (JARDIANCE) 25 MG TABS tablet Take 1 tablet (25 mg) by mouth every morning. 90 tablet 3    EPINEPHrine (ANY BX GENERIC EQUIV) 0.3 MG/0.3ML injection 2-pack Inject 0.3 mLs (0.3 mg) into the muscle as needed for anaphylaxis (related to bee stings) May repeat one time in 5-15 minutes if response to initial dose is inadequate. 2 each 1     fluconazole (DIFLUCAN) 150 MG tablet Take 1 tablet (150 mg) by mouth every 3 days. 3 tablet 0    fluticasone (FLONASE) 50 MCG/ACT nasal spray Spray 1 spray into both nostrils 2 times daily Use at night before bed 15.8 mL 3    Fluticasone-Umeclidin-Vilanterol (TRELEGY ELLIPTA) 200-62.5-25 MCG/ACT oral inhaler USE 1 INHALATION DAILY 28 each 5    Glucagon (GVOKE HYPOPEN) 1 MG/0.2ML pen Inject the contents of 1 device under the skin into lower abdomen, outer thigh, or outer upper arm as needed for hypoglycemia. If no response after 15 minutes, additional 1 mg dose from a new device may be injected while waiting for emergency assistance. 0.4 mL 1    GLUCOSAMINE-CHONDROITIN -400 MG tablet TAKE 1 TABLET DAILY 90 tablet 3    insulin glargine (LANTUS PEN) 100 UNIT/ML pen Inject 6 Units subcutaneously at bedtime. 15 mL 1    insulin pen needle (32G X 4 MM) 32G X 4 MM miscellaneous Use 4 pen needles daily or as directed. 360 each 3    levalbuterol (XOPENEX) 1.25 MG/3ML neb solution Take 3 mLs (1.25 mg) by nebulization every 4 hours as needed for wheezing.      losartan (COZAAR) 50 MG tablet Take 1 tablet (50 mg) by mouth daily. 90 tablet 3    miconazole (MICATIN) 2 % external powder Apply topically as needed for itching or other. Please show least expensive option for purchase. 43 g 0    NOVOLOG FLEXPEN 100 UNIT/ML soln Inject 4-7 Units subcutaneously 4 times daily (with meals and nightly). Inject 2-15 15 mL 3    Nutritional Supplements (GLUCERNA SHAKE) LIQD Take 1 Can by mouth 3 times daily (with meals).      omega-3 acid ethyl esters (LOVAZA) 1 g capsule Take 2 capsules (2 g) by mouth 2 times daily 360 capsule 3    omeprazole (PRILOSEC) 20 MG DR capsule Take 1 capsule (20 mg) by mouth 2 times daily. 180 capsule 3    oxyCODONE (OXY-IR) 5 MG capsule Take 5 mg by mouth every 4 hours as needed for severe pain.      polyethylene glycol (MIRALAX) 17 GM/Dose powder Take 17 g by mouth daily      polyethylene glycol-propylene  glycol (SYSTANE) 0.4-0.3 % SOLN ophthalmic solution Place 1 drop into both eyes 4 times daily 5 mL 11    predniSONE (DELTASONE) 5 MG tablet       pregabalin (LYRICA) 150 MG capsule Take 1 capsule (150 mg) by mouth 2 times daily TAKE 1 CAPSULE(150 MG) BY MOUTH TWICE DAILY 180 capsule 3    roflumilast (DALIRESP) 500 MCG TABS tablet Take 1 tablet (500 mcg) by mouth every morning. 90 tablet 3    traMADol (ULTRAM) 50 MG tablet Take 1 tablet (50 mg) by mouth every 6 hours as needed for severe pain. 50 tablet 0    Vitamin D, Cholecalciferol, 25 MCG (1000 UT) CAPS Take 25 mcg by mouth daily. 200 capsule 1     Histories reviewed and updated in Epic.      Physical examination:  Vitals: /70   Pulse 109   Wt 73.5 kg (162 lb)   SpO2 (!) 91%   BMI 26.96 kg/m    Wt Readings from Last 10 Encounters:   04/15/25 73.5 kg (162 lb)   04/03/25 71.7 kg (158 lb)   01/16/25 75.6 kg (166 lb 9.6 oz)   01/02/25 81.6 kg (180 lb)   12/19/24 82.1 kg (181 lb 1.6 oz)   11/18/24 85.7 kg (189 lb)   10/15/24 85.7 kg (189 lb)   10/02/24 84.8 kg (187 lb)   09/26/24 85 kg (187 lb 4.8 oz)   09/16/24 89.4 kg (197 lb 3.2 oz)       BMIE= Body mass index is 26.96 kg/m .  Exam:  Constitutional: healthy, alert, no acute distress.  Pleasant elderly female with oxygen tank and nasal cannula oxygen.  Mood is good warm and engaged.  Affect appropriate.  Thought form and content are fluent and coherent with good recall.  Head: Normocephalic. No masses, lesions, no exophthalmos/proptosis  Respiratory: nonlabored  Musculoskeletal: extremities normal- no gross deformities noted, gait normal and normal muscle tone  Skin: no suspicious lesions or rashes appreciated.  Neurologic: Gait normal. sensation grossly intact  Psychiatric: mentation appears normal, calm as above    Foot Exam: No lower extremity edema.    Most Recent Laboratory Tests:      Hemoglobin   Date Value Ref Range Status   04/01/2025 14.0 11.7 - 15.7 g/dL Final   06/25/2021 12.9 11.7 - 15.7 g/dL  Final   ]  Recent Labs   Lab Test 04/15/25  1316 04/01/25  0834 01/02/25  0925 12/19/24  0858 08/17/24  1155 08/17/24  0205 01/18/24  1540 11/27/23  0833 10/26/23  0926 10/26/23  0912 08/23/23  1214 08/23/23  0926 05/25/23  0909 04/17/23  0937   A1C 8.8*  --  9.6*  --    < >  --    < > 8.1*  --   --    < >  --   --   --    TSH  --   --   --   --   --  3.46  --   --   --  3.79  --   --   --   --    LDL  --   --   --   --   --   --   --  77  --  146*  --   --   --   --    HDL  --   --   --   --   --   --   --  76  --  55  --   --   --   --    TRIG  --   --   --   --   --   --   --  64  --  96  --   --   --   --    CR  --  0.48*  --  0.5   < > 1.35*   < > 0.59   < > 0.56   < >  --    < >  --    MICROL  --   --   --   --   --   --   --   --   --   --   --  158.0  --  38.1    < > = values in this interval not displayed.         Assessment/Plan    #Type 2Diabetes mellitus, improved glycemic control, at goal in the last 2 weeks but with ongoing elevated A1c.  A1c today: 8.8    CGM data: 59% TIR,32% high, 9%very high  She stopped Mounjaro Trulicity and Ozempic due to intolerance. Metformin caused intolerance. Glipizide caused hypoglycemia.  Her most recent CGM data suggests improved glycemic control without significant hypoglycemia.    She is losing weight and I advise she see her primary care provider regarding this and follow through with plans to do colonoscopy which she is reluctant about.  Discussed that though her lung cancer stable she can have other malignancies.      Recommend to continue glargine 6 units daily.  Recommend she continue to give 5 units of NovoLog only if blood sugars greater than 180 before meals, but consider giving 3 units if blood sugar greater than 150 to prevent prolonged postprandial rise and help her maintain her weight.       2.  Diabetes preventive care.  -Abnormal foot exam on 10/27/2023 in January 2025 foot care discussed at that visit, management of diabetic neuropathy as  below  -Microalbuminuria persistent on labs in 8/2023; on SGLT2 inhibitor, Losartan.  Asking her to update this.  -Eye exam on 4/5/2024  showed no diabetic retinopathy;      3.  Diabetic neuropathy.  On Lyrica and vitamin B12 500 mg daily.  Also taking ALA supplement.      4.  Dyslipidemia.  On atorvastatin since 1/2024.  Continue current regimen without changes.     PLAN  -Continue glargine 6 units  -Novolog 5 units with big meals and 3 units with small meals  -Follow up in 3 months as scheduled with Dr. Casillas  -Continue Jardiance  - Has glucagon kit.   - Update microalbuminuria.   -Encouraged in her good work maintain her blood sugar.    38 minutes spent on the date of the encounter doing chart review, history and exam, documentation, education and counseling, as well as communication and coordination of care, and further activities as noted above.   This time includes time spent reviewing CGM, reviewing and ordering lab tests, medications and supplies.    This note has been dictated and may include dictation errors.  It is my privilege to be involved in the care of the above patient.     Mariana López PA-C, MPAS  Diabetes, Endocrinology, and Metabolism  [phone] Appointments/Nurse Line  [phone] Fax  [phone] office  [email or web address]                     03/31/25 2:36 PM  PATIENT LAB/IMAGING STATUS : Orders completed / No further action needed   Patient is showing 3/5 MNCM met. A1c not in range

## 2025-04-01 ENCOUNTER — LAB (OUTPATIENT)
Dept: LAB | Facility: CLINIC | Age: 70
End: 2025-04-01
Payer: COMMERCIAL

## 2025-04-01 ENCOUNTER — ANCILLARY PROCEDURE (OUTPATIENT)
Dept: CT IMAGING | Facility: CLINIC | Age: 70
End: 2025-04-01
Attending: INTERNAL MEDICINE
Payer: COMMERCIAL

## 2025-04-01 DIAGNOSIS — C34.31 MALIGNANT NEOPLASM OF LOWER LOBE OF RIGHT LUNG (H): ICD-10-CM

## 2025-04-01 DIAGNOSIS — K21.9 GASTROESOPHAGEAL REFLUX DISEASE, UNSPECIFIED WHETHER ESOPHAGITIS PRESENT: ICD-10-CM

## 2025-04-01 DIAGNOSIS — I10 HYPERTENSION, UNSPECIFIED TYPE: ICD-10-CM

## 2025-04-01 LAB
ALBUMIN SERPL BCG-MCNC: 4.1 G/DL (ref 3.5–5.2)
ALP SERPL-CCNC: 88 U/L (ref 40–150)
ALT SERPL W P-5'-P-CCNC: <5 U/L (ref 0–50)
ANION GAP SERPL CALCULATED.3IONS-SCNC: 10 MMOL/L (ref 7–15)
AST SERPL W P-5'-P-CCNC: 16 U/L (ref 0–45)
BASOPHILS # BLD AUTO: 0.1 10E3/UL (ref 0–0.2)
BASOPHILS NFR BLD AUTO: 1 %
BILIRUB SERPL-MCNC: 0.3 MG/DL
BUN SERPL-MCNC: 6 MG/DL (ref 8–23)
CALCIUM SERPL-MCNC: 9.6 MG/DL (ref 8.8–10.4)
CHLORIDE SERPL-SCNC: 103 MMOL/L (ref 98–107)
CREAT SERPL-MCNC: 0.48 MG/DL (ref 0.51–0.95)
EGFRCR SERPLBLD CKD-EPI 2021: >90 ML/MIN/1.73M2
EOSINOPHIL # BLD AUTO: 0.6 10E3/UL (ref 0–0.7)
EOSINOPHIL NFR BLD AUTO: 8 %
ERYTHROCYTE [DISTWIDTH] IN BLOOD BY AUTOMATED COUNT: 13.6 % (ref 10–15)
GLUCOSE SERPL-MCNC: 187 MG/DL (ref 70–99)
HCO3 SERPL-SCNC: 30 MMOL/L (ref 22–29)
HCT VFR BLD AUTO: 45.9 % (ref 35–47)
HGB BLD-MCNC: 14 G/DL (ref 11.7–15.7)
IMM GRANULOCYTES # BLD: 0 10E3/UL
IMM GRANULOCYTES NFR BLD: 0 %
LYMPHOCYTES # BLD AUTO: 2 10E3/UL (ref 0.8–5.3)
LYMPHOCYTES NFR BLD AUTO: 27 %
MCH RBC QN AUTO: 27.5 PG (ref 26.5–33)
MCHC RBC AUTO-ENTMCNC: 30.5 G/DL (ref 31.5–36.5)
MCV RBC AUTO: 90 FL (ref 78–100)
MONOCYTES # BLD AUTO: 0.5 10E3/UL (ref 0–1.3)
MONOCYTES NFR BLD AUTO: 7 %
NEUTROPHILS # BLD AUTO: 4.1 10E3/UL (ref 1.6–8.3)
NEUTROPHILS NFR BLD AUTO: 56 %
NRBC # BLD AUTO: 0 10E3/UL
NRBC BLD AUTO-RTO: 0 /100
PLATELET # BLD AUTO: 202 10E3/UL (ref 150–450)
POTASSIUM SERPL-SCNC: 3.6 MMOL/L (ref 3.4–5.3)
PROT SERPL-MCNC: 7 G/DL (ref 6.4–8.3)
RBC # BLD AUTO: 5.1 10E6/UL (ref 3.8–5.2)
SODIUM SERPL-SCNC: 143 MMOL/L (ref 135–145)
WBC # BLD AUTO: 7.3 10E3/UL (ref 4–11)

## 2025-04-01 PROCEDURE — 80053 COMPREHEN METABOLIC PANEL: CPT | Performed by: PATHOLOGY

## 2025-04-01 PROCEDURE — 36415 COLL VENOUS BLD VENIPUNCTURE: CPT | Performed by: PATHOLOGY

## 2025-04-01 PROCEDURE — 71260 CT THORAX DX C+: CPT | Mod: GC | Performed by: RADIOLOGY

## 2025-04-01 PROCEDURE — 85025 COMPLETE CBC W/AUTO DIFF WBC: CPT | Performed by: PATHOLOGY

## 2025-04-01 PROCEDURE — 74160 CT ABDOMEN W/CONTRAST: CPT | Mod: GC | Performed by: RADIOLOGY

## 2025-04-01 RX ORDER — LOSARTAN POTASSIUM 50 MG/1
50 TABLET ORAL DAILY
Qty: 90 TABLET | Refills: 3 | Status: SHIPPED | OUTPATIENT
Start: 2025-04-01

## 2025-04-01 RX ORDER — IOPAMIDOL 755 MG/ML
86 INJECTION, SOLUTION INTRAVASCULAR ONCE
Status: COMPLETED | OUTPATIENT
Start: 2025-04-01 | End: 2025-04-01

## 2025-04-01 RX ORDER — OMEPRAZOLE 20 MG/1
20 CAPSULE, DELAYED RELEASE ORAL 2 TIMES DAILY
Qty: 180 CAPSULE | Refills: 3 | Status: SHIPPED | OUTPATIENT
Start: 2025-04-01

## 2025-04-01 RX ADMIN — IOPAMIDOL 86 ML: 755 INJECTION, SOLUTION INTRAVASCULAR at 09:30

## 2025-04-01 NOTE — DISCHARGE INSTRUCTIONS

## 2025-04-03 ENCOUNTER — VIRTUAL VISIT (OUTPATIENT)
Dept: ONCOLOGY | Facility: CLINIC | Age: 70
End: 2025-04-03
Attending: INTERNAL MEDICINE
Payer: COMMERCIAL

## 2025-04-03 VITALS — BODY MASS INDEX: 26.33 KG/M2 | HEIGHT: 65 IN | WEIGHT: 158 LBS

## 2025-04-03 DIAGNOSIS — C34.31 MALIGNANT NEOPLASM OF LOWER LOBE OF RIGHT LUNG (H): Primary | ICD-10-CM

## 2025-04-03 ASSESSMENT — PAIN SCALES - GENERAL: PAINLEVEL_OUTOF10: SEVERE PAIN (10)

## 2025-04-03 NOTE — LETTER
4/3/2025      Jenn Aparicio  [address]  [place] [address]      Dear Colleague,    Thank you for referring your patient, Jenn Aparicio, to the [facility] [place] [facility]. Please see a copy of my visit note below.        [facility] [place] [facility]  [address] [address]  Phone: [phone]  Fax: [phone]    PATIENT NAME: Jenn Aparicio  MRN # [id number]   DATE OF VISIT: April 3, 2025  YOB: 1955     CANCER TYPE: Lung adenocarcinoma  STAGE: IA2 [id number] poorly diff adeno RLL, then [id number] IA2 suspected NSCLC RUL, medically inoperable      ECOG PS: 2 (COPD, back)     PD-L1: N/A  Lung panel: N/A  NGS: N/A    ASSESSMENT AND PLAN  NSCLC, adeno, RUL, L2 lesion: See extensive prior notes but in summary, peripheral RLL looks a little better on current CT. The new medial RUL nodule is resolved, indicating more likely to have been a mucus plug vs inflammatory. CT CAP in 5-6 months with labs. Will look at L2 area again next time.      Hemoptysis: Resolved.     Weight loss: Not discussed today     LBP: Continues to be pretty bad, working with NP Overkamp on this. Has a lift bed now, helping. Mobility greatly reduced.      COPD: O2 dependent and intermittent exacerbations     Remainder of issues not actively discussed today  DM2: Seeing Dr. Shabazz in Endocrine  Dysphagia: Met with Dr. Briseno in GI 7/18/23, has had pretty extensive evaluation before, last discussed in 2023.  Peripheral neuropathy: Mostly driven by DM     The longitudinal plan of care for the condition(s) below were addressed during this visit. Due to the added complexity in care, I will continue to support Jenn in the subsequent management of this condition(s) and with the ongoing continuity of care of this condition(s): adenocarcinoma of the lung      30 minutes spent by me on the date of the encounter doing chart review, review of test results, interpretation of tests, patient  visit, documentation, orders     Zarina Leung MD  Associate Professor of Medicine  Hematology, Oncology and Transplantation    SUBJECTIVE  Jenn returns for close interim follow up of lung adeno and the R peripheral lung lesion that looked worse last number of months, too risky to bx.   Back pain still pretty bad. Difficult to get around. Daughters do shopping for her nowadays. Has a lift bed, which has helped a lot.  Brother passed away from cancer last month. Lived here in the [facility] so she was able to see him a few days before he passed away.     CANCER SUMMARY  12/24/15 PET/CT  1/13/15 CT lung bx. Adenocarcinoma  2/8/16 R thoracotomy, RLL lobectomy (Dr. Cunha). Adenocarcinoma, 1.1 cm, assoicated with atypical adenomatous hyperplasia  10/18/19 CTA for shortness of breath. New 1.3 cm RUL nodule  11/2019 PET/CT. 1.1 cm RUL hypermetabolic nodule (SUV 12.6)  12/26/19 Brain MRI negative for mets  1/14~1/28/20 SBRT to RUL nodule, 5000 cGy, every other day, 1000 cGy ([place]. Cur[place] [facility] No bx due to O2 dependence and too much risk  8/19/20 First visit with me   11/29/21 CT chest. New 10 mm RUL nodule  1/11/22 CT chest. New 7.2 mm RUL nodule, unchanged RUL nodules  3/31/22 CT chest. New RUL nodule from Jan 2022 7-->10 mm, new 5 mm ROBERT nodule  5/20~5/24/22 [facility] for COPD exacerbation.   6/24/22 CT chest non contrast.   8/16/22 EGD. 3 cm hiatal hernia, o/w normal  8/31/22 CT chest. 2.5 x 1.3 cm R midlung subpleural nodularity (4:98) previously 2.3 x 1.1 cm, slightly increased solid component   10/5/22 PET/CT. 2 x 1.3 cm irregular opacity posterior RUL (SUV 2.46), 2.4 x 0.8 cm linear opacity (SUV 4.76); there was bronchiectasia on prior imaging. Stable 1.1 x 0.8 cm non FDG avid RUL opacity and unchanged 4 mm posterior RUL nodule. No adenopathy. Inferior right breast uptake (SUV 6.09) likely infectious or inflammatory.   2/3~2/9/23 [facility] for COPD exacerbation  2/4/23 CTA during hospitalization. No significant change in  2.3 x 1.5 cm spiculated nodule in the right upper lobe (series 7 image 94) with surrounding linear parenchymal opacities and subtle nodularity  2/20~2/24/23 [facility] for COPD exacerbation.   3/1/23 CT chest non contrast. 2.3 cm posterior RUL irregular nodule unchanged from 12/1/22 however slightly increased soft tissue component compared to 10/5/22 PET. Stable ROBERT mucous plugging with micronodularity.  3/22/23 CT chest. Stable compared to 3/1/23  5/25/23 CT chest abd w/contrast. Unchanged 1.3 x 0.5 cm R apical nodule, unchnaged 2.4 x 1.3 cm R upper nodular consolidation. New nodular/masslike area of consolidation anteriorly and laterally measuring 3.3 x 1.4 cm, not present on CT 3/22/23. No adenopathy. Consider PET or close surveillance CT in 3 months  7/19/23 Bronch, EBUS (Dr. Garcia). 11L negative, scant cells. 4L unsatisfactory for eval. 8 negative, scant cellularity, 4R unsatisfactory, 11R negative, scan cellularity, RUL negative, showed acute inflammation, limited by scant cellularity.  5/15/24 CT CAP. Slightly enlarging RUL consolidation, several new indeterminate RML nodules, other stable nodules, new L2 lytic lesion  6/11/24 PET. Poor quality and low sensitivity due to hyperglycemia. New 0.8 x 1.0 cm R apical nodule (SUV 3.1), interval decreased size/uptake of patchy RUL GGO (SUV 2.4, previously 6.1). Few scattered nodules in both upper lobes with near complete resolution of FDG avidity, mild uptake associated with the lucent focus along the superior endplate of L2 (SUV 3.8).  6/26/24 MRI L spine. T1/2 hypointense, STIR hyperintense and enhancing lytic lesion L2 c/w mets. Mod neural foramenal steonosis bilaterally L3- and R L4-5.  7/22/24 L2 bx (IR). Path: negative for malignancy, showed fragments of benign bone and marrow tissue  8/14~8/26/2024 [facility] for A-fib with RVR, acute on chronic lower back pain, DARIO, new LLE weakness and numbness    PAST MEDICAL HISTORY  Lung adenocarcinoma  DM2 with neuropathy  HCV IA,  undetectable in 2019, treated  COPD. O2 dependent since late 2018. PFT 11/26/19. FEV1 0.64 (30%), FVC 1.69 (63%), DLCOunc 9.8 (38%).  HTN  H/o ITP  H/o disk herniation  Ankle surgery  Median neuropathy R  H/o sessile colon polyps 2014, h/o adenomas before that. Colonoscopy 2/7/18 @ [facility] [place]. Sessile serated adenoma x 1, tubular adenoma x 3  H/o chronic LBP  Dyslipidemia  Cataracts    CURRENT OUTPATIENT MEDICATIONS  Reviewed    ALLERGIES  Allergies   Allergen Reactions     Aspirin      325mg      Bee Venom Anaphylaxis     Penicillins Hives     Acetaminophen GI Disturbance     Azithromycin Dizziness     Colon Care      Interferons Dermatitis     Metformin GI Disturbance      PHYSICAL EXAM  There were no vitals taken for this visit.  GEN: NAD  HEENT: EOMI, no icterus, injection or pallor  NEURO: alert    Remainder of physical exam deferred due to public health emergency and limitations of video visit.    LABORATORY AND IMAGING STUDIES    Labs 4/1/25 independently reviewed and interpreted by me - CBC pd and CMP - both acceptable     CT Chest Abdomen w Contrast  Narrative: EXAMINATION: CT CHEST ABDOMEN W CONTRAST 4/1/2025 9:39 AM    TECHNIQUE: Helical CT images from the thoracic inlet through the iliac  crests were obtained with IV contrast. Contrast dose: Isovue 370 86 mL    COMPARISON: CT 12/19/2024, 9/30/2024, 8/24/2024    HISTORY: Restage lung adeno, chronic worrisome RUL findings. New RUL  nodule on Dec 2024 CT, re-evaluate.    FINDINGS:    CHEST:  LUNGS: Stable postsurgical changes of right lower lobectomy. Small  amount of debris present in the right mainstem bronchus. No  pneumothorax or pleural effusion. Upper lobe predominant advanced  emphysematous changes with nodular scarring at the apices. Decreased  size of the irregular opacity in the medial right upper lobe, now  measuring 5 mm, previously 13 mm (4/81). No significant change of the  irregular plate-like opacity in the posterior right upper lobe  measuring  approximately 3.1 x 2.4 cm (4/97, 7/69). Additional  scattered sub-6 mm pulmonary nodules appear stable. Similar small  focus of tree-in-bud nodularity in the posterior left upper lobe,  likely sequela of prior infection. No definite new or enlarging  pulmonary nodules identified.     MEDIASTINUM: Heart size is within normal limits. No pericardial  effusion. Ascending aorta and main pulmonary artery diameters are  within normal limits. Mild atherosclerotic calcifications of the  thoracic aorta. Multivessel coronary artery calcifications.  Mediastinal clips. No suspicious mediastinal, hilar, or axillary lymph  nodes. Visualized thyroid and esophagus are unremarkable.    ABDOMEN:  LIVER: No suspicious focal hepatic lesion.    BILIARY: No calcified intraluminal gallstone. No intrahepatic or  extrahepatic biliary ductal dilation.    PANCREAS: No focal pancreatic lesion. The main pancreatic duct is not  dilated.    SPLEEN: Within normal limits.    ADRENAL GLANDS: No focal adrenal nodule.    URINARY TRACT: No suspicious renal lesion. No hydronephrosis or  hydroureter. No obstructing renal stone.    STOMACH: Within normal limits.    BOWEL: Normal caliber large and small bowel. No abnormal bowel wall  thickening or enhancement. Mild colonic diverticulosis without  evidence of acute diverticulitis.     PERITONEUM/FLUID: No ascites.    VESSELS: No aneurysmal dilatation of the abdominal aorta.  Mild-moderate atherosclerotic calcifications of the abdominal aorta  and common iliac arteries. The portal, splenic, and superior  mesenteric veins are patent. The origins of the celiac and superior  mesenteric arteries are patent.    LYMPH NODES: No lymphadenopathy.    BONES/SOFT TISSUES: No aggressive osseous lesions. Stable Schmorl's  endplate deformity of L2. Bones are osteopenic again with multilevel  degenerative changes throughout the visualized spine. Small  fat-containing umbilical hernia. Stable subcutaneous nodule along  the  medial left breast.  Impression: IMPRESSION:   1. No significant change in size of the irregular plate-like opacity  in the posterior right upper lobe compared to 12/19/2024.  2. Previously noted new irregular opacity in the medial right upper  lobe on 12/19/2024 CT has significantly decreased in size, favoring an  infectious/inflammatory process. Recommend continued attention on  follow-up.  3. No suspicious findings in the abdomen.    I have personally reviewed the examination and initial interpretation  and I agree with the findings.    RICARDA EUBANKS MD         SYSTEM ID:  [id number]     CT chest abd personally reviewed and interpreted by me  RUL opacity definitely smaller than Dec and Sept 2024  Agree that medial RUL nodule that was new in Dec 2024 is now gone    Virtual Visit Details  Type of service:  Video Visit   Video Start Time: 9:25 AM  Video End Time:9:34 AM  Distant Location (provider location):  Off-site  Platform used for Video Visit: [employer]ale[employer]               Again, thank you for allowing me to participate in the care of your patient.        Sincerely,        Zarina Leung MD    Electronically signed

## 2025-04-03 NOTE — NURSING NOTE
Patient confirms medications and allergies are accurate via patients echeck in completion, and or denies any changes since last reviewed/verified.   Current patient location: [address]  [place] [place] [address]    Is the patient currently in the state of [place]? YES    Visit mode: VIDEO    If the visit is dropped, the patient can be reconnected by:VIDEO VISIT: Text to cell phone:   Telephone Information:   Mobile [phone]       Will anyone else be joining the visit? NO  (If patient encounters technical issues they should call [phone]    Are changes needed to the allergy or medication list? No    Are refills needed on medications prescribed by this physician? NO    Rooming Documentation:  Patient declined to complete questionnaire(s)    Reason for visit: RECHECK    Abigail BULLOCK

## 2025-04-07 ENCOUNTER — TELEPHONE (OUTPATIENT)
Dept: ONCOLOGY | Facility: CLINIC | Age: 70
End: 2025-04-07
Payer: COMMERCIAL

## 2025-04-07 ENCOUNTER — PATIENT OUTREACH (OUTPATIENT)
Dept: CARE COORDINATION | Facility: CLINIC | Age: 70
End: 2025-04-07
Payer: COMMERCIAL

## 2025-04-07 NOTE — PROGRESS NOTES
Social Work - Distress Screen Intervention  [facility]    Identified Concern and Score from Distress Screenin. How concerned are you about your ability to eat? (!) 9     2. How concerned are you about unintended weight loss or your current weight? (!) 9     3. How concerned are you about feeling depressed or very sad?  (!) 9     4. How concerned are you about feeling anxious or very scared?  2     5. Do you struggle with the loss of meaning and irving in your life?  (!) A great deal     6. How concerned are you about work and home life issues that may be affected by your cancer?  5     7. How concerned are you about knowing what resources are available to help you?  1     8. Do you currently have what you would describe as Jain or spiritual struggles? Not at all     9. If you want to be contacted by one of our professionals, I can send a message to them right now.  -- (none)       Date of Distress Screen: 4/3/25  Data: At time of last visit, patient scored positive on distress screening.  outreached to patient today to follow up on elevated distress and introduce psychosocial services and support.  Intervention/Education provided:  contacted patient by phone to discuss distress screening results. SW reviewed distress screen. Patient has declined social work intervention. SW will not out reach to patient at this time.     Follow-up Required:  will remain available to patient for support as needed    EVERETT Razo, [facility]    [facility]  [address] [place] [id number]  Office: [phone]  Fax: [phone]  Gender Pronouns: She/Her  Employed by [employer]  Support Groups at [facility]: Social Work Services for Cancer Patients ([email or web address])  Employed by [employer]

## 2025-04-07 NOTE — PROGRESS NOTES
CLINICAL NUTRITION SERVICES- ONCOLOGY DISTRESS SCREENING     Identified Concern and Score From Distress Screenin. How concerned are you about your ability to eat? :  9  2. How concerned are you about unintended weight loss or your current weight? : 9     Date of Distress Screenin/3     Findings: Phone call with Jenn. Reports that her appetite is poor. Some taste changes, early satiety, and nausea. Eating small meals. Reports that she is tolerating soft foods best. Likes foods like ice cream but limits those due to her diabetes. Drinks Premier Protein.      Intervention: Discussed current intake, encouraged small frequent meals, soft food options and encouraged continuing Premier Protein.      Follow-up Required: As needed.     Soha Morgan RD, LD  [phone]

## 2025-04-12 ENCOUNTER — HEALTH MAINTENANCE LETTER (OUTPATIENT)
Age: 70
End: 2025-04-12

## 2025-04-15 ENCOUNTER — OFFICE VISIT (OUTPATIENT)
Dept: ENDOCRINOLOGY | Facility: CLINIC | Age: 70
End: 2025-04-15
Payer: COMMERCIAL

## 2025-04-15 VITALS
DIASTOLIC BLOOD PRESSURE: 70 MMHG | SYSTOLIC BLOOD PRESSURE: 110 MMHG | OXYGEN SATURATION: 91 % | HEART RATE: 109 BPM | WEIGHT: 162 LBS | BODY MASS INDEX: 26.96 KG/M2

## 2025-04-15 DIAGNOSIS — N18.30 TYPE 2 DIABETES MELLITUS WITH STAGE 3 CHRONIC KIDNEY DISEASE, WITHOUT LONG-TERM CURRENT USE OF INSULIN, UNSPECIFIED WHETHER STAGE 3A OR 3B CKD (H): ICD-10-CM

## 2025-04-15 DIAGNOSIS — E11.22 TYPE 2 DIABETES MELLITUS WITH STAGE 3 CHRONIC KIDNEY DISEASE, WITHOUT LONG-TERM CURRENT USE OF INSULIN, UNSPECIFIED WHETHER STAGE 3A OR 3B CKD (H): ICD-10-CM

## 2025-04-15 DIAGNOSIS — E11.65 TYPE 2 DIABETES MELLITUS WITH HYPERGLYCEMIA, WITHOUT LONG-TERM CURRENT USE OF INSULIN (H): ICD-10-CM

## 2025-04-15 LAB
EST. AVERAGE GLUCOSE BLD GHB EST-MCNC: 206 MG/DL
HBA1C MFR BLD: 8.8 %

## 2025-04-15 PROCEDURE — 83036 HEMOGLOBIN GLYCOSYLATED A1C: CPT | Performed by: PATHOLOGY

## 2025-04-15 RX ORDER — INSULIN ASPART 100 [IU]/ML
4-7 INJECTION, SOLUTION INTRAVENOUS; SUBCUTANEOUS
Qty: 15 ML | Refills: 1 | Status: SHIPPED | OUTPATIENT
Start: 2025-04-15 | End: 2025-04-17

## 2025-04-15 ASSESSMENT — PAIN SCALES - GENERAL: PAINLEVEL_OUTOF10: SEVERE PAIN (10)

## 2025-04-15 NOTE — NURSING NOTE
"Chief Complaint   Patient presents with    Diabetes     Vital signs:      BP: 110/70 Pulse: 109     SpO2: (!) 91 %       Weight: 73.5 kg (162 lb)  Estimated body mass index is 26.96 kg/m  as calculated from the following:    Height as of 4/3/25: 1.651 m (5' 5\").    Weight as of this encounter: 73.5 kg (162 lb).        "

## 2025-04-15 NOTE — LETTER
4/15/2025       RE: Jenn Aparicio  [address]  [place] [address]     Dear Colleague,    Thank you for referring your patient, Jenn Aparicio, to the [facility] ENDOCRINOLOGY [facility] [place] at [facility]. Please see a copy of my visit note below.    Endocrinology Clinic Visit 1/1/2025    NAME:  Jenn Aparicio  PCP:  Mitzi Nettles  MRN:  [id number]  Reason for Consult:  Follow up visit for diabetes management  Requesting Provider:  No ref. provider found        History of Present Illness:     Jenn Aparicio is a 69 year old yo female with PMHx of RLL adenocarcinoma s/p lobectomy (2/2016), severe COPD (FEV1 0.55, Z-4.42 4/2023) with acute exacerbation, chronic hypoxemic hypercapnic respiratory failure (3-4L O2, pVCO2 55-60 baseline), HT is here for diabetes follow up visit.    Duration of Diabetes:between 15-20 years  -She is uncertain when she was diagnosed with diabetes: Progress Notes as far back as 2008 mention diagnosis of type 2 diabetes.  She previously was seen in endocrinology at [facility].    Hospitalization:None    Previous Diabetes medications:  Metformin, developed diarrhea, has tried both extended release and short acting formulations  -Januvia appeared on medication list previously, does not recall side effect with this.  Treated with Ozempic, discontinued in 2023 due to nausea, headache and muscle cramping.  -Insulin, recalls developing hypoglycemia prompting discontinuation  -Glipizide was prescribed but discontinued soon thereafter due to hypoglycemia.   -Mounjaro 5 mg weekly--there was 1 month lapse in treatment with Mounjaro when she was transitioning from 2.5 mg to 5 mg dose; she just took second dose of Mounjaro 5 mg weekly.  Has some nausea with eating. Used it for few weeks and the stopped because abdominal upset and loss of appetite.      Today:  She is losing weight, not wanting to eat, having sweats day and night.   She has a heat rash on her neck that start 2-3 months ago. Today she hasn't eaten at all.  She brought a banana with and Boost in hopes would want  to eat, but hasn't.  No longer going out to eat as can't eat much.  Full with a banana.  Knows not enough so making herself eat.  Thinks at night BG high as really trying to eat.  Meats taste horrible to her.  Recently didn't eat at all and slept for three days.  She reports fatigue and sweats for the last 2 to 3 months.  She has had decreased appetite since trying GLP-1 agonists, but the early satiety is possibly getting worse.  She denies melena or hematochezia and is due for colon cancer screening but has reluctant to do this she is wondering if she can be sedated for it, ideally unconscious.    Appetite really not back since tried Trulicity Ozempic and Mounjaro.      She has been taking glargine every night, she is rarely taking her NovoLog as she reports that she feels uneasy with her blood sugar in the 150s or even 160s at times.  She is really only taking the 5 units with meals if her blood sugar is greater than 180 before the meal so she is mostly not taking it.  There are several days where she does not take NovoLog at all.      A1c today is 8.8%.    Current treatment strategy:  Jardiance 25 mg daily  Novolog 5 units with meals and night if she eats - only takes if BG >180 premeal.    Glargine 6 units every other night 9-10 pm.      CGMS data:  We reviewed this together.  Average blood sugar is 177 and correlates to a GMI of 7.5%.  Time in range is 57% with no hypoglycemia.      We do note 1 instance of a low blood sugar shortly after midnight.  She reported that she does have candy at her bedside and was easily able to recognize and treat this she did feel weak and shaky and hot, though as noted feeling hot and sweaty frequently.      Per review of records, he has had diabetes 15-20 years ago  Diabetes Complication Screening:  -Hypoglycemia: YES, very rarely.  Has hypoglycemia awareness.  -Retinopathy: NO. Last Eye Exam: April 2024. Patient receives eye care Internal  -Nephropathy: No. Creatinine 0.5 and GFR>90  -Neuropathy: Diabetic neuropathy.  On Lyrica. Last foot exam:1/2/25  -BP: At Goal. On losartan 50.  -ASCVD: NO. 11/27/23LDL Measured 77  On atorvastatin 10 mg.  -ASA: NO    Current Outpatient Medications   Medication Sig Dispense Refill     acetylcysteine (MUCOMYST) 10 % nebulizer solution Inhale 4 mLs into the lungs 4 times daily as needed for mucolysis/respiratory distress 30 mL 5     albuterol (PROAIR HFA/PROVENTIL HFA/VENTOLIN HFA) 108 (90 Base) MCG/ACT inhaler USE 1 OR 2 INHALATIONS EVERY 4 HOURS AS NEEDED 17 g 11     albuterol (PROVENTIL) (2.5 MG/3ML) 0.083% neb solution Take 1 vial (2.5 mg) by nebulization 4 times daily 360 mL 5     alpha-lipoic acid 600 MG capsule Take 1 capsule (600 mg) by mouth daily 90 capsule 3     apixaban ANTICOAGULANT (ELIQUIS) 5 MG tablet Take 1 tablet (5 mg) by mouth 2 times daily. 180 tablet 1     atorvastatin (LIPITOR) 10 MG tablet Take 1 tablet (10 mg) by mouth daily 90 tablet 3     carboxymethylcellulose (REFRESH LIQUIGEL) 1 % ophthalmic solution Place 1 drop into both eyes 4 times daily 15 mL 11     cetirizine (ZYRTEC) 10 MG tablet Take 1 tablet (10 mg) by mouth every morning. 90 tablet 1     clobetasol (TEMOVATE) 0.05 % external ointment Apply topically 2 times daily. 15 g 2     Continuous Glucose Sensor (DEXCOM G7 SENSOR) MISC Change every 10 days. 3 each 5     cyanocobalamin (VITAMIN B-12) 500 MCG tablet Take 1 tablet (500 mcg) by mouth daily 90 tablet 3     cyclobenzaprine (FLEXERIL) 5 MG tablet Take 1 tablet (5 mg) by mouth 2 times daily as needed for muscle spasms. 60 tablet 0     cycloSPORINE (RESTASIS) 0.05 % ophthalmic emulsion Place 1 drop into both eyes 2 times daily. 60 each 11     DULoxetine (CYMBALTA) 60 MG capsule Take 1 capsule (60 mg) by mouth 2 times daily. 60 capsule 3     empagliflozin (JARDIANCE) 25 MG  TABS tablet Take 1 tablet (25 mg) by mouth every morning. 90 tablet 3     EPINEPHrine (ANY BX GENERIC EQUIV) 0.3 MG/0.3ML injection 2-pack Inject 0.3 mLs (0.3 mg) into the muscle as needed for anaphylaxis (related to bee stings) May repeat one time in 5-15 minutes if response to initial dose is inadequate. 2 each 1     fluconazole (DIFLUCAN) 150 MG tablet Take 1 tablet (150 mg) by mouth every 3 days. 3 tablet 0     fluticasone (FLONASE) 50 MCG/ACT nasal spray Spray 1 spray into both nostrils 2 times daily Use at night before bed 15.8 mL 3     Fluticasone-Umeclidin-Vilanterol (TRELEGY ELLIPTA) 200-62.5-25 MCG/ACT oral inhaler USE 1 INHALATION DAILY 28 each 5     Glucagon (GVOKE HYPOPEN) 1 MG/0.2ML pen Inject the contents of 1 device under the skin into lower abdomen, outer thigh, or outer upper arm as needed for hypoglycemia. If no response after 15 minutes, additional 1 mg dose from a new device may be injected while waiting for emergency assistance. 0.4 mL 1     GLUCOSAMINE-CHONDROITIN -400 MG tablet TAKE 1 TABLET DAILY 90 tablet 3     insulin glargine (LANTUS PEN) 100 UNIT/ML pen Inject 6 Units subcutaneously at bedtime. 15 mL 1     insulin pen needle (32G X 4 MM) 32G X 4 MM miscellaneous Use 4 pen needles daily or as directed. 360 each 3     levalbuterol (XOPENEX) 1.25 MG/3ML neb solution Take 3 mLs (1.25 mg) by nebulization every 4 hours as needed for wheezing.       losartan (COZAAR) 50 MG tablet Take 1 tablet (50 mg) by mouth daily. 90 tablet 3     miconazole (MICATIN) 2 % external powder Apply topically as needed for itching or other. Please show least expensive option for purchase. 43 g 0     NOVOLOG FLEXPEN 100 UNIT/ML soln Inject 4-7 Units subcutaneously 4 times daily (with meals and nightly). Inject 2-15 15 mL 3     Nutritional Supplements (GLUCERNA SHAKE) LIQD Take 1 Can by mouth 3 times daily (with meals).       omega-3 acid ethyl esters (LOVAZA) 1 g capsule Take 2 capsules (2 g) by mouth 2 times  daily 360 capsule 3     omeprazole (PRILOSEC) 20 MG DR capsule Take 1 capsule (20 mg) by mouth 2 times daily. 180 capsule 3     oxyCODONE (OXY-IR) 5 MG capsule Take 5 mg by mouth every 4 hours as needed for severe pain.       polyethylene glycol (MIRALAX) 17 GM/Dose powder Take 17 g by mouth daily       polyethylene glycol-propylene glycol (SYSTANE) 0.4-0.3 % SOLN ophthalmic solution Place 1 drop into both eyes 4 times daily 5 mL 11     predniSONE (DELTASONE) 5 MG tablet        pregabalin (LYRICA) 150 MG capsule Take 1 capsule (150 mg) by mouth 2 times daily TAKE 1 CAPSULE(150 MG) BY MOUTH TWICE DAILY 180 capsule 3     roflumilast (DALIRESP) 500 MCG TABS tablet Take 1 tablet (500 mcg) by mouth every morning. 90 tablet 3     traMADol (ULTRAM) 50 MG tablet Take 1 tablet (50 mg) by mouth every 6 hours as needed for severe pain. 50 tablet 0     Vitamin D, Cholecalciferol, 25 MCG (1000 UT) CAPS Take 25 mcg by mouth daily. 200 capsule 1     Histories reviewed and updated in Epic.      Physical examination:  Vitals: /70   Pulse 109   Wt 73.5 kg (162 lb)   SpO2 (!) 91%   BMI 26.96 kg/m    Wt Readings from Last 10 Encounters:   04/15/25 73.5 kg (162 lb)   04/03/25 71.7 kg (158 lb)   01/16/25 75.6 kg (166 lb 9.6 oz)   01/02/25 81.6 kg (180 lb)   12/19/24 82.1 kg (181 lb 1.6 oz)   11/18/24 85.7 kg (189 lb)   10/15/24 85.7 kg (189 lb)   10/02/24 84.8 kg (187 lb)   09/26/24 85 kg (187 lb 4.8 oz)   09/16/24 89.4 kg (197 lb 3.2 oz)       BMIE= Body mass index is 26.96 kg/m .  Exam:  Constitutional: healthy, alert, no acute distress.  Pleasant elderly female with oxygen tank and nasal cannula oxygen.  Mood is good warm and engaged.  Affect appropriate.  Thought form and content are fluent and coherent with good recall.  Head: Normocephalic. No masses, lesions, no exophthalmos/proptosis  Respiratory: nonlabored  Musculoskeletal: extremities normal- no gross deformities noted, gait normal and normal muscle tone  Skin: no  suspicious lesions or rashes appreciated.  Neurologic: Gait normal. sensation grossly intact  Psychiatric: mentation appears normal, calm as above    Foot Exam: No lower extremity edema.    Most Recent Laboratory Tests:      Hemoglobin   Date Value Ref Range Status   04/01/2025 14.0 11.7 - 15.7 g/dL Final   06/25/2021 12.9 11.7 - 15.7 g/dL Final   ]  Recent Labs   Lab Test 04/15/25  1316 04/01/25  0834 01/02/25  0925 12/19/24  0858 08/17/24  1155 08/17/24  0205 01/18/24  1540 11/27/23  0833 10/26/23  0926 10/26/23  0912 08/23/23  1214 08/23/23  0926 05/25/23  0909 04/17/23  0937   A1C 8.8*  --  9.6*  --    < >  --    < > 8.1*  --   --    < >  --   --   --    TSH  --   --   --   --   --  3.46  --   --   --  3.79  --   --   --   --    LDL  --   --   --   --   --   --   --  77  --  146*  --   --   --   --    HDL  --   --   --   --   --   --   --  76  --  55  --   --   --   --    TRIG  --   --   --   --   --   --   --  64  --  96  --   --   --   --    CR  --  0.48*  --  0.5   < > 1.35*   < > 0.59   < > 0.56   < >  --    < >  --    MICROL  --   --   --   --   --   --   --   --   --   --   --  158.0  --  38.1    < > = values in this interval not displayed.         Assessment/Plan    #Type 2Diabetes mellitus, improved glycemic control, at goal in the last 2 weeks but with ongoing elevated A1c.  A1c today: 8.8    CGM data: 59% TIR,32% high, 9%very high  She stopped Mounjaro Trulicity and Ozempic due to intolerance. Metformin caused intolerance. Glipizide caused hypoglycemia.  Her most recent CGM data suggests improved glycemic control without significant hypoglycemia.    She is losing weight and I advise she see her primary care provider regarding this and follow through with plans to do colonoscopy which she is reluctant about.  Discussed that though her lung cancer stable she can have other malignancies.      Recommend to continue glargine 6 units daily.  Recommend she continue to give 5 units of NovoLog only if blood  sugars greater than 180 before meals, but consider giving 3 units if blood sugar greater than 150 to prevent prolonged postprandial rise and help her maintain her weight.       2.  Diabetes preventive care.  -Abnormal foot exam on 10/27/2023 in January 2025 foot care discussed at that visit, management of diabetic neuropathy as below  -Microalbuminuria persistent on labs in 8/2023; on SGLT2 inhibitor, Losartan.  Asking her to update this.  -Eye exam on 4/5/2024  showed no diabetic retinopathy;      3.  Diabetic neuropathy.  On Lyrica and vitamin B12 500 mg daily.  Also taking ALA supplement.      4.  Dyslipidemia.  On atorvastatin since 1/2024.  Continue current regimen without changes.     PLAN  -Continue glargine 6 units  -Novolog 5 units with big meals and 3 units with small meals  -Follow up in 3 months as scheduled with Dr. Casillas  -Continue Jardiance  - Has glucagon kit.   - Update microalbuminuria.   -Encouraged in her good work maintain her blood sugar.    38 minutes spent on the date of the encounter doing chart review, history and exam, documentation, education and counseling, as well as communication and coordination of care, and further activities as noted above.   This time includes time spent reviewing CGM, reviewing and ordering lab tests, medications and supplies.    This note has been dictated and may include dictation errors.  It is my privilege to be involved in the care of the above patient.     Mariana López PA-C, MPAS  Diabetes, Endocrinology, and Metabolism  [phone] Appointments/Nurse Line  [phone] Fax  [phone] office  [email or web address]                     03/31/25 2:36 PM  PATIENT LAB/IMAGING STATUS : Orders completed / No further action needed   Patient is showing 3/5 MNCM met. A1c not in range                                   Again, thank you for allowing me to participate in the care of your patient.      Sincerely,    Mariana López PA-C

## 2025-04-15 NOTE — PATIENT INSTRUCTIONS
Dear Jenn,  Please continue to take Glargine 6 units daily.      For Humalog (short acting):  If blood sugar before you eat is >150, give 3 units.    If blood sugar before eating >180, give 5 units.    Please see primary care provider about weight loss and night sweats and poor appetite.          My best wishes,    Mariana López PA-C, MPAS  [facility]  Diabetes, Endocrinology, and Metabolism  [phone] Appointments/Nurse  [phone] Fax

## 2025-04-16 ENCOUNTER — TRANSFERRED RECORDS (OUTPATIENT)
Dept: HEALTH INFORMATION MANAGEMENT | Facility: CLINIC | Age: 70
End: 2025-04-16
Payer: COMMERCIAL

## 2025-04-17 DIAGNOSIS — E11.22 TYPE 2 DIABETES MELLITUS WITH STAGE 3 CHRONIC KIDNEY DISEASE, WITHOUT LONG-TERM CURRENT USE OF INSULIN, UNSPECIFIED WHETHER STAGE 3A OR 3B CKD (H): ICD-10-CM

## 2025-04-17 DIAGNOSIS — N18.30 TYPE 2 DIABETES MELLITUS WITH STAGE 3 CHRONIC KIDNEY DISEASE, WITHOUT LONG-TERM CURRENT USE OF INSULIN, UNSPECIFIED WHETHER STAGE 3A OR 3B CKD (H): ICD-10-CM

## 2025-04-17 RX ORDER — INSULIN ASPART 100 [IU]/ML
INJECTION, SOLUTION INTRAVENOUS; SUBCUTANEOUS
Qty: 30 ML | Refills: 1 | Status: SHIPPED | OUTPATIENT
Start: 2025-04-17

## 2025-04-26 DIAGNOSIS — Z11.1 SCREENING EXAMINATION FOR PULMONARY TUBERCULOSIS: Primary | ICD-10-CM

## 2025-04-29 ENCOUNTER — TELEPHONE (OUTPATIENT)
Dept: PULMONOLOGY | Facility: CLINIC | Age: 70
End: 2025-04-29
Payer: COMMERCIAL

## 2025-04-29 DIAGNOSIS — J44.9 CHRONIC OBSTRUCTIVE PULMONARY DISEASE, UNSPECIFIED COPD TYPE (H): ICD-10-CM

## 2025-04-29 DIAGNOSIS — J44.1 OBSTRUCTIVE CHRONIC BRONCHITIS WITH EXACERBATION (H): Primary | ICD-10-CM

## 2025-04-29 NOTE — TELEPHONE ENCOUNTER
----- Message from Yohana DAVIES sent at 4/29/2025  9:03 AM CDT -----  Regarding: RE: Poor appetite weight loss night sweats early satiety  Thanks,  Just let me know and I will put in the PFT order under the provider she will be seeing.  ----- Message -----  From: Millie Iverson  Sent: 4/29/2025   8:58 AM CDT  To: Yohana Castro RN  Subject: RE: Poor appetite weight loss night sweats e#    Next fellows opening is 5/14, I can call her!  ----- Message -----  From: Yohana Castro RN  Sent: 4/29/2025   8:26 AM CDT  To: Millie Iverson  Subject: FW: Poor appetite weight loss night sweats e#    Millie-  Please call to set up appointment per Dr. Pearce's request. She already had a chest CT, but she needs PFTS. [facility], so she can be seen with any fellow. When is the soonest we can get her in?  Thanks,  Yohana  ----- Message -----  From: Zarina Leung MD  Sent: 4/28/2025  10:22 PM CDT  To: Yohana Castro RN; #  Subject: RE: Poor appetite weight loss night sweats e#    She just had CT chest abd with me 4/1 if that's sufficient for now - it was not a high res CT if that matters. Thanks much!  ----- Message -----  From: Cleopatra Pearce MD  Sent: 4/28/2025   3:08 PM CDT  To: Yohana Castro RN; #  Subject: RE: Poor appetite weight loss night sweats e#    Hi all,    I haven't seen her except when staffing in fellow's clinic. It sounds like she needs to be seen by pulmonary again asap. Unfortunately, I do not see gen pulm patients any more (I staff fellow's clinic or I do CF and transplant).     Yohana,  Can we see if we can get Jenn in to be seen in gen pulm clinic sooner rather than later? I would have her do full PFTs, and a CT chest non contrast prior to visit.     Thanks!    Cleopatra Pearce  ----- Message -----  From: Zarina Leung MD  Sent: 4/20/2025   8:06 PM CDT  To: BENOIT Razo CNP; #  Subject: RE: Poor appetite weight loss night sweats e#    Hi thanks for the message -   I worry about her too,  although I'm less worried about lung cancer now then I was last year.  I would think her COPD is driving a lot of this. She works pretty hard to breath and her PFTs from just about 2 years ago in April 2023 are beyond horrible. Will see what Mary and Cleopatra think (cc'd)  ----- Message -----  From: Mariana López PA-C  Sent: 4/15/2025   1:57 PM CDT  To: BENOIT Razo CNP; Zarina Leung MD  Subject: Poor appetite weight loss night sweats early#    good afternoon!  I just saw Jenn in clinic and she reported the above for the last 2-3 months.  It appears that she lost 30 lbs Sept while intermittently on GLP-1 agonist and weight has largely stabilized since, but other symptoms night and day sweats have developed since then.  She at times gets a heat rash on her neck from feeling so hot and sweaty.  Her appetite does not seem to be recovering possibly worse since stopping GLP-1 agonists completely.  I know she just saw you Dr. Leung.  I advised she proceed with colon CA screening that is due and see you Mitzi.      Thank you,  Mariana

## 2025-04-29 NOTE — TELEPHONE ENCOUNTER
Pt is scheduled on 5/14 for follow-up visit with Dr Cruz ([facility] pt). PFT prior, pt is aware.

## 2025-05-04 NOTE — LETTER
1/12/2022         RE: Jenn Aparicio  [address]  [place] [address]        Dear Colleague,    Thank you for referring your patient, Jenn Aparicio, to the [facility]. Please see a copy of my visit note below.    Chief Complaint   Patient presents with     Annual Eye Exam     Diabetic Eye Exam        Chief Complaint(s) and History of Present Illness(es)     Annual Eye Exam     Laterality: both eyes              Diabetic Eye Exam     Vision: is stable    Diabetes Type: Type 2    Duration: 10 years    Blood Sugars: is controlled               Hemoglobin A1C POCT   Date Value Ref Range Status   02/24/2021 7.0 (H) 0 - 5.6 % Final     Comment:     Normal <5.7% Prediabetes 5.7-6.4%  Diabetes 6.5% or higher - adopted from ADA   consensus guidelines.     09/08/2020 6.8 (H) 0 - 5.6 % Final     Comment:     Normal <5.7% Prediabetes 5.7-6.4%  Diabetes 6.5% or higher - adopted from ADA   consensus guidelines.       Hemoglobin A1C   Date Value Ref Range Status   01/11/2022 8.3 (H) 0.0 - 5.6 % Final     Comment:     Normal <5.7%   Prediabetes 5.7-6.4%    Diabetes 6.5% or higher     Note: Adopted from ADA consensus guidelines.   12/01/2015 7.1 (H) 4.2 - 6.1 % Final            Last Eye Exam: 2019  Dilated Previously: Yes, OK to dilate    What are you currently using to see?  glasses    Distance Vision Acuity: Noticed gradual change in both eyes    Near Vision Acuity: Satisfied with vision while reading  with glasses    Eye Comfort: dry  Do you use eye drops? : Yes: OTC dry eye-Visine-itchy and watery with allergies  Occupation or Hobbies: Retired    Tara Ortiz     Medical, surgical and family histories reviewed and updated 1/12/2022.       OBJECTIVE: See Ophthalmology exam    ASSESSMENT:    ICD-10-CM    1. Type 2 diabetes mellitus without complication, without long-term current use of insulin (H)  E11.9 EYE EXAM (SIMPLE-NONBILLABLE)    Negative diabetic retinopathy   2. Combined forms of  age-related cataract of both eyes  H25.813 EYE EXAM (SIMPLE-NONBILLABLE)   3. Allergic conjunctivitis of both eyes  H10.13 olopatadine (PATADAY) 0.2 % ophthalmic solution     EYE EXAM (SIMPLE-NONBILLABLE)   4. Dry eye syndrome of both eyes  H04.123 polyethylene glycol-propylene glycol (SYSTANE) 0.4-0.3 % SOLN ophthalmic solution     EYE EXAM (SIMPLE-NONBILLABLE)   5. Presbyopia  H52.4 REFRACTION   6. Hyperopia, bilateral  H52.03 REFRACTION   7. Regular astigmatism of both eyes  H52.223 REFRACTION      PLAN:    Jenn Aparicio aware  eye exam results will be sent to Mt[facility] Nettles  Patient Instructions   There are not any signs of the diabetes affecting the eyes today.  It is important that you get your eyes dilated once yearly and keep good control of your diabetes.    You have cataracts which are affecting your vision.  A change in glasses will not give you much improvement.  A cataract evaluation can be scheduled with the eye surgeon to discuss with you the option of cataract surgery. (patient declines- will try glasses first)     Pataday to be used once daily for itchy eyes.  Use as needed. Contact your pharmacy for refills.    Systane Ultra 1 drop both eyes 2-4 x day.    Eyeglass prescription given.    Return in 1 year for a complete eye exam or sooner if needed.    Joe Landers, JUAN ANTONIO                 Again, thank you for allowing me to participate in the care of your patient.        Sincerely,        Joe Landers, JUAN ANTONIO     Improved

## 2025-05-13 NOTE — PROGRESS NOTES
"Pulmonology Clinic Follow up Visit     Jenn Aparicio MRN# [id number]   YOB: 1955 Age: 69 year old     Reason for Visit: COPD follow up         Assessment and Plan:   69F with very severe COPD (FEV1 0.64, Z-4.04  5/2025), RLL adeno s/p lobectomy (2016), RUL NSCLC s/p SBRT (1/2020), ongoing tobacco use, DM2, and HTN who initially established with pulmonary clinic 12/2020 after transferring her oncology care to [facility].  She has hx of frequent COPD exacerbations. Last seen 3/20/24.     #Severe COPD (FEV1 0.64, Z-4.404 5/2025)   #Chronic hypoxemic hypercapnic respiratory failure (3-4L O2, pVCO2 55-60 baseline)  Improvement in symptoms per patient after starting rofulimast with number of exacerbations significantly declined. She has stepped down therapy and uses nebs and vest as needed. PFT today demonstrate improvement in spirometry. CT chest w/o new or enlarging changes.     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    # Weight loss  # Night sweats  Per patient, weight loss aligns w/ starting of GLP-1 medications. She attributes night sweats to new bed mattress that \"retains heat\". Finally, reporting that MSK and neuropathic pain is overall leading to lots of fatigue and decreased mobility.  Overall, symptoms do not appear respiratory in nature, especially given stable CT chest, slightly improved PFTs, and lack of worsening of symptoms and decrease in COPD exacerbations. Will add on Quantgold given chronic RUL opacity, but unlikely to be TB.    Recommendations:               - Continue Trelegy (LAMA/LABA/ICS)              - Continue secretion management PRN:                          - Vest therapy BID PRN                          - Mucomyst neb BID PRN              - Duonebs QID PRN              - Encouraged nightly NIPPV, patient not using              - Continue " "roflumilast daily, refill ordered, of not can lead to GI symptoms               - COPD home action plan reordered: prednisone + doxycycline on hand for 5-days (3 refills ordered for the winter)   - Quant gold ordered   - Consider pain referral per PCP               - RTC 6 months    Patient seen and discussed with Dr. Michael Cruz MD  Pulmonary and Critical Care Fellow        History of Present Illness / Interval History:   Jenn Aparicio is a 69 year old female with H/O severe COPD (FEV1 0.55, Z-4.42 4/2023) c/b chronic hypoxic hypercapnic respiratory failure (4L chronic oxygen), RLL adeno s/p lobectomy (2016), RUL NSCLC s/p SBRT (1/2020), ongoing tobacco use, DM2, and HTN who established with pulmonary clinic 12/2020 after transferring her oncology care to [facility].  She was last seen 3/2024 for frequent exacerbations.      Today, she reports , from a respiratory standpoint she has been doing okay.  She denies any changes to cough or shortness of breath.  She reports decreased exercise tolerance due to worsening chronic musculoskeletal and neuropathic pain.  The pain has been limiting her and affecting her energy levels overall.  She does endorse weight loss that started after she began GLP-1 medications for her diabetes.    She reports no hemoptysis, occasionally has dry nose with small streaks of blood associated with phlegm.No new admissions or need for prednisone since last seen.  She feels like the Roflumilast has been helpful in decreasing flareups of her COPD, however recently she has run out of the Roflumilast.    She reports a history of \" TB\" that she got from blood transfusions and was treated for, upon chart review, it appears she was referencing hepatitis C and not TB.    Recent imaging showed stable lung findings.     Pulm regimen:  - Trelegy inhaler  - Roflumilast, ran out but was taking  - DuoNebs PRN  - Mucomyst PRN          Review of Systems:   Focused ROS performed, negative except as " noted above.         Physical Examination:   There were no vitals taken for this visit.    Gen: sitting upright, in no distress  HEENT: atraumatic, EOMI  Oropharynx: no sinus tenderness  CV: RRR, no peripheral edema noted  Resp: no increased WOB, mild expiratory wheezing  Abd: not distended, non-tender  Skin: no rashes or lesions on exposed skin  Extremities: moving all extremities, no gross deformities  Neuro: alert and oriented no FND         Data:   PFT 5/14/25: severe obstruction w/ air trapping, and severe diffusion impairment. No BD response, but borderline (9%)      CT 4/2025:  1. No significant change in size of the irregular plate-like opacity  in the posterior right upper lobe compared to 12/19/2024.  2. Previously noted new irregular opacity in the medial right upper  lobe on 12/19/2024 CT has significantly decreased in size, favoring an  infectious/inflammatory process. Recommend continued attention on  follow-up.  3. No suspicious findings in the abdomen.           Medications:     Current Outpatient Medications   Medication Sig Dispense Refill    acetylcysteine (MUCOMYST) 10 % nebulizer solution Inhale 4 mLs into the lungs 4 times daily as needed for mucolysis/respiratory distress 30 mL 5    albuterol (PROAIR HFA/PROVENTIL HFA/VENTOLIN HFA) 108 (90 Base) MCG/ACT inhaler USE 1 OR 2 INHALATIONS EVERY 4 HOURS AS NEEDED 17 g 11    albuterol (PROVENTIL) (2.5 MG/3ML) 0.083% neb solution Take 1 vial (2.5 mg) by nebulization 4 times daily 360 mL 5    alpha-lipoic acid 600 MG capsule Take 1 capsule (600 mg) by mouth daily 90 capsule 3    apixaban ANTICOAGULANT (ELIQUIS) 5 MG tablet Take 1 tablet (5 mg) by mouth 2 times daily. 180 tablet 1    atorvastatin (LIPITOR) 10 MG tablet Take 1 tablet (10 mg) by mouth daily 90 tablet 3    carboxymethylcellulose (REFRESH LIQUIGEL) 1 % ophthalmic solution Place 1 drop into both eyes 4 times daily 15 mL 11    cetirizine (ZYRTEC) 10 MG tablet Take 1 tablet (10 mg) by mouth  every morning. 90 tablet 1    clobetasol (TEMOVATE) 0.05 % external ointment Apply topically 2 times daily. 15 g 2    Continuous Glucose Sensor (DEXCOM G7 SENSOR) MISC Change every 10 days. 3 each 5    cyanocobalamin (VITAMIN B-12) 500 MCG tablet Take 1 tablet (500 mcg) by mouth daily 90 tablet 3    cyclobenzaprine (FLEXERIL) 5 MG tablet Take 1 tablet (5 mg) by mouth 2 times daily as needed for muscle spasms. 60 tablet 0    cycloSPORINE (RESTASIS) 0.05 % ophthalmic emulsion Place 1 drop into both eyes 2 times daily. 60 each 11    DULoxetine (CYMBALTA) 60 MG capsule Take 1 capsule (60 mg) by mouth 2 times daily. 60 capsule 3    empagliflozin (JARDIANCE) 25 MG TABS tablet Take 1 tablet (25 mg) by mouth every morning. 90 tablet 3    EPINEPHrine (ANY BX GENERIC EQUIV) 0.3 MG/0.3ML injection 2-pack Inject 0.3 mLs (0.3 mg) into the muscle as needed for anaphylaxis (related to bee stings) May repeat one time in 5-15 minutes if response to initial dose is inadequate. 2 each 1    fluconazole (DIFLUCAN) 150 MG tablet Take 1 tablet (150 mg) by mouth every 3 days. 3 tablet 0    fluticasone (FLONASE) 50 MCG/ACT nasal spray Spray 1 spray into both nostrils 2 times daily Use at night before bed 15.8 mL 3    Fluticasone-Umeclidin-Vilanterol (TRELEGY ELLIPTA) 200-62.5-25 MCG/ACT oral inhaler USE 1 INHALATION DAILY 28 each 5    Glucagon (GVOKE HYPOPEN) 1 MG/0.2ML pen Inject the contents of 1 device under the skin into lower abdomen, outer thigh, or outer upper arm as needed for hypoglycemia. If no response after 15 minutes, additional 1 mg dose from a new device may be injected while waiting for emergency assistance. 0.4 mL 1    GLUCOSAMINE-CHONDROITIN -400 MG tablet TAKE 1 TABLET DAILY 90 tablet 3    insulin glargine (LANTUS PEN) 100 UNIT/ML pen Inject 6 Units subcutaneously at bedtime. 15 mL 1    insulin pen needle (32G X 4 MM) 32G X 4 MM miscellaneous Use 4 pen needles daily or as directed. 360 each 3    levalbuterol  (XOPENEX) 1.25 MG/3ML neb solution Take 3 mLs (1.25 mg) by nebulization every 4 hours as needed for wheezing.      losartan (COZAAR) 50 MG tablet Take 1 tablet (50 mg) by mouth daily. 90 tablet 3    miconazole (MICATIN) 2 % external powder Apply topically as needed for itching or other. Please show least expensive option for purchase. 43 g 0    NOVOLOG FLEXPEN 100 UNIT/ML soln Inject 4-7  units four times daily subcutaneous approx 30 units daily 30 mL 1    Nutritional Supplements (GLUCERNA SHAKE) LIQD Take 1 Can by mouth 3 times daily (with meals).      omega-3 acid ethyl esters (LOVAZA) 1 g capsule Take 2 capsules (2 g) by mouth 2 times daily 360 capsule 3    omeprazole (PRILOSEC) 20 MG DR capsule Take 1 capsule (20 mg) by mouth 2 times daily. 180 capsule 3    oxyCODONE (OXY-IR) 5 MG capsule Take 5 mg by mouth every 4 hours as needed for severe pain.      polyethylene glycol (MIRALAX) 17 GM/Dose powder Take 17 g by mouth daily      polyethylene glycol-propylene glycol (SYSTANE) 0.4-0.3 % SOLN ophthalmic solution Place 1 drop into both eyes 4 times daily 5 mL 11    predniSONE (DELTASONE) 5 MG tablet       pregabalin (LYRICA) 150 MG capsule Take 1 capsule (150 mg) by mouth 2 times daily TAKE 1 CAPSULE(150 MG) BY MOUTH TWICE DAILY 180 capsule 3    roflumilast (DALIRESP) 500 MCG TABS tablet Take 1 tablet (500 mcg) by mouth every morning. 90 tablet 3    traMADol (ULTRAM) 50 MG tablet Take 1 tablet (50 mg) by mouth every 6 hours as needed for severe pain. 50 tablet 0    Vitamin D, Cholecalciferol, 25 MCG (1000 UT) CAPS Take 25 mcg by mouth daily. 200 capsule 1     No current facility-administered medications for this visit.

## 2025-05-14 ENCOUNTER — OFFICE VISIT (OUTPATIENT)
Dept: PULMONOLOGY | Facility: CLINIC | Age: 70
End: 2025-05-14
Attending: STUDENT IN AN ORGANIZED HEALTH CARE EDUCATION/TRAINING PROGRAM
Payer: COMMERCIAL

## 2025-05-14 ENCOUNTER — LAB (OUTPATIENT)
Dept: LAB | Facility: CLINIC | Age: 70
End: 2025-05-14
Payer: COMMERCIAL

## 2025-05-14 VITALS
SYSTOLIC BLOOD PRESSURE: 150 MMHG | OXYGEN SATURATION: 98 % | HEART RATE: 101 BPM | BODY MASS INDEX: 27.39 KG/M2 | HEIGHT: 65 IN | DIASTOLIC BLOOD PRESSURE: 75 MMHG | WEIGHT: 164.4 LBS

## 2025-05-14 DIAGNOSIS — J44.9 CHRONIC OBSTRUCTIVE PULMONARY DISEASE, UNSPECIFIED COPD TYPE (H): ICD-10-CM

## 2025-05-14 DIAGNOSIS — R93.89 OPACITY NOTED ON IMAGING STUDY: Primary | ICD-10-CM

## 2025-05-14 DIAGNOSIS — J44.1 CHRONIC OBSTRUCTIVE PULMONARY DISEASE WITH ACUTE EXACERBATION (H): ICD-10-CM

## 2025-05-14 DIAGNOSIS — R93.89 OPACITY NOTED ON IMAGING STUDY: ICD-10-CM

## 2025-05-14 LAB
DLCOUNC-%PRED-PRE: 43 %
DLCOUNC-PRE: 8.68 ML/MIN/MMHG
DLCOUNC-PRED: 19.96 ML/MIN/MMHG
ERV-%PRED-PRE: 103 %
ERV-PRE: 1.11 L
ERV-PRED: 1.07 L
EXPTIME-PRE: 12.42 SEC
FEF2575-%PRED-POST: 12 %
FEF2575-%PRED-PRE: 9 %
FEF2575-POST: 0.24 L/SEC
FEF2575-PRE: 0.18 L/SEC
FEF2575-PRED: 1.86 L/SEC
FEFMAX-%PRED-PRE: 27 %
FEFMAX-PRE: 1.64 L/SEC
FEFMAX-PRED: 5.97 L/SEC
FEV1-%PRED-PRE: 29 %
FEV1-PRE: 0.64 L
FEV1FEV6-PRE: 46 %
FEV1FEV6-PRED: 79 %
FEV1FVC-PRE: 35 %
FEV1FVC-PRED: 79 %
FEV1SVC-PRE: 34 %
FEV1SVC-PRED: 66 %
FIFMAX-PRE: 2.02 L/SEC
FRCPLETH-%PRED-PRE: 148 %
FRCPLETH-PRE: 4.55 L
FRCPLETH-PRED: 3.07 L
FVC-%PRED-PRE: 64 %
FVC-PRE: 1.81 L
FVC-PRED: 2.81 L
IC-%PRED-PRE: 35 %
IC-PRE: 0.75 L
IC-PRED: 2.14 L
RVPLETH-%PRED-PRE: 159 %
RVPLETH-PRE: 3.44 L
RVPLETH-PRED: 2.15 L
TLCPLETH-%PRED-PRE: 100 %
TLCPLETH-PRE: 5.3 L
TLCPLETH-PRED: 5.27 L
VA-%PRED-PRE: 59 %
VA-PRE: 2.88 L
VC-%PRED-PRE: 56 %
VC-PRE: 1.86 L
VC-PRED: 3.32 L

## 2025-05-14 PROCEDURE — G0463 HOSPITAL OUTPT CLINIC VISIT: HCPCS | Performed by: STUDENT IN AN ORGANIZED HEALTH CARE EDUCATION/TRAINING PROGRAM

## 2025-05-14 PROCEDURE — 3077F SYST BP >= 140 MM HG: CPT | Performed by: STUDENT IN AN ORGANIZED HEALTH CARE EDUCATION/TRAINING PROGRAM

## 2025-05-14 PROCEDURE — 99000 SPECIMEN HANDLING OFFICE-LAB: CPT | Performed by: PATHOLOGY

## 2025-05-14 PROCEDURE — 99214 OFFICE O/P EST MOD 30 MIN: CPT | Mod: GC | Performed by: STUDENT IN AN ORGANIZED HEALTH CARE EDUCATION/TRAINING PROGRAM

## 2025-05-14 PROCEDURE — 36415 COLL VENOUS BLD VENIPUNCTURE: CPT | Performed by: PATHOLOGY

## 2025-05-14 PROCEDURE — 3078F DIAST BP <80 MM HG: CPT | Performed by: STUDENT IN AN ORGANIZED HEALTH CARE EDUCATION/TRAINING PROGRAM

## 2025-05-14 PROCEDURE — 1126F AMNT PAIN NOTED NONE PRSNT: CPT | Performed by: STUDENT IN AN ORGANIZED HEALTH CARE EDUCATION/TRAINING PROGRAM

## 2025-05-14 PROCEDURE — 86481 TB AG RESPONSE T-CELL SUSP: CPT | Performed by: INTERNAL MEDICINE

## 2025-05-14 RX ORDER — DOXYCYCLINE HYCLATE 100 MG
100 TABLET ORAL 2 TIMES DAILY
Qty: 10 TABLET | Refills: 3 | Status: SHIPPED | OUTPATIENT
Start: 2025-05-14

## 2025-05-14 RX ORDER — ROFLUMILAST 500 UG/1
500 TABLET ORAL EVERY MORNING
Qty: 90 TABLET | Refills: 3 | Status: SHIPPED | OUTPATIENT
Start: 2025-05-14

## 2025-05-14 RX ORDER — PREDNISONE 20 MG/1
TABLET ORAL
Qty: 10 TABLET | Refills: 2 | Status: SHIPPED | OUTPATIENT
Start: 2025-05-14

## 2025-05-14 ASSESSMENT — PAIN SCALES - GENERAL: PAINLEVEL_OUTOF10: NO PAIN (0)

## 2025-05-14 NOTE — LETTER
"5/14/2025      Jenn Aparicio  [address]  [place] [address]      Dear Colleague,    Thank you for referring your patient, Jenn Aparicio, to the [facility] [place]. Please see a copy of my visit note below.    Pulmonology Clinic Follow up Visit     Jenn Aparicio MRN# [id number]   YOB: 1955 Age: 69 year old     Reason for Visit: COPD follow up         Assessment and Plan:   69F with very severe COPD (FEV1 0.64, Z-4.04  5/2025), RLL adeno s/p lobectomy (2016), RUL NSCLC s/p SBRT (1/2020), ongoing tobacco use, DM2, and HTN who initially established with pulmonary clinic 12/2020 after transferring her oncology care to [facility].  She has hx of frequent COPD exacerbations. Last seen 3/20/24.     #Severe COPD (FEV1 0.64, Z-4.404 5/2025)   #Chronic hypoxemic hypercapnic respiratory failure (3-4L O2, pVCO2 55-60 baseline)  Improvement in symptoms per patient after starting rofulimast with number of exacerbations significantly declined. She has stepped down therapy and uses nebs and vest as needed. PFT today demonstrate improvement in spirometry. CT chest w/o new or enlarging changes.     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    # Weight loss  # Night sweats  Per patient, weight loss aligns w/ starting of GLP-1 medications. She attributes night sweats to new bed mattress that \"retains heat\". Finally, reporting that MSK and neuropathic pain is overall leading to lots of fatigue and decreased mobility.  Overall, symptoms do not appear respiratory in nature, especially given stable CT chest, slightly improved PFTs, and lack of worsening of symptoms and decrease in COPD exacerbations. Will add on Quantgold given chronic RUL opacity, but unlikely to be TB.    Recommendations:               - Continue Trelegy " "(LAMA/LABA/ICS)              - Continue secretion management PRN:                          - Vest therapy BID PRN                          - Mucomyst neb BID PRN              - Duonebs QID PRN              - Encouraged nightly NIPPV, patient not using              - Continue roflumilast daily, refill ordered, of not can lead to GI symptoms               - COPD home action plan reordered: prednisone + doxycycline on hand for 5-days (3 refills ordered for the winter)   - Quant gold ordered   - Consider pain referral per PCP               - RTC 6 months    Patient seen and discussed with Dr. Michael Cruz MD  Pulmonary and Critical Care Fellow        History of Present Illness / Interval History:   Jenn Aparicio is a 69 year old female with H/O severe COPD (FEV1 0.55, Z-4.42 4/2023) c/b chronic hypoxic hypercapnic respiratory failure (4L chronic oxygen), RLL adeno s/p lobectomy (2016), RUL NSCLC s/p SBRT (1/2020), ongoing tobacco use, DM2, and HTN who established with pulmonary clinic 12/2020 after transferring her oncology care to [facility].  She was last seen 3/2024 for frequent exacerbations.      Today, she reports , from a respiratory standpoint she has been doing okay.  She denies any changes to cough or shortness of breath.  She reports decreased exercise tolerance due to worsening chronic musculoskeletal and neuropathic pain.  The pain has been limiting her and affecting her energy levels overall.  She does endorse weight loss that started after she began GLP-1 medications for her diabetes.    She reports no hemoptysis, occasionally has dry nose with small streaks of blood associated with phlegm.No new admissions or need for prednisone since last seen.  She feels like the Roflumilast has been helpful in decreasing flareups of her COPD, however recently she has run out of the Roflumilast.    She reports a history of \" TB\" that she got from blood transfusions and was treated for, upon chart review, it " appears she was referencing hepatitis C and not TB.    Recent imaging showed stable lung findings.     Pulm regimen:  - Trelegy inhaler  - Roflumilast, ran out but was taking  - DuoNebs PRN  - Mucomyst PRN          Review of Systems:   Focused ROS performed, negative except as noted above.         Physical Examination:   There were no vitals taken for this visit.    Gen: sitting upright, in no distress  HEENT: atraumatic, EOMI  Oropharynx: no sinus tenderness  CV: RRR, no peripheral edema noted  Resp: no increased WOB, mild expiratory wheezing  Abd: not distended, non-tender  Skin: no rashes or lesions on exposed skin  Extremities: moving all extremities, no gross deformities  Neuro: alert and oriented no FND         Data:   PFT 5/14/25: severe obstruction w/ air trapping, and severe diffusion impairment. No BD response, but borderline (9%)      CT 4/2025:  1. No significant change in size of the irregular plate-like opacity  in the posterior right upper lobe compared to 12/19/2024.  2. Previously noted new irregular opacity in the medial right upper  lobe on 12/19/2024 CT has significantly decreased in size, favoring an  infectious/inflammatory process. Recommend continued attention on  follow-up.  3. No suspicious findings in the abdomen.           Medications:     Current Outpatient Medications   Medication Sig Dispense Refill     acetylcysteine (MUCOMYST) 10 % nebulizer solution Inhale 4 mLs into the lungs 4 times daily as needed for mucolysis/respiratory distress 30 mL 5     albuterol (PROAIR HFA/PROVENTIL HFA/VENTOLIN HFA) 108 (90 Base) MCG/ACT inhaler USE 1 OR 2 INHALATIONS EVERY 4 HOURS AS NEEDED 17 g 11     albuterol (PROVENTIL) (2.5 MG/3ML) 0.083% neb solution Take 1 vial (2.5 mg) by nebulization 4 times daily 360 mL 5     alpha-lipoic acid 600 MG capsule Take 1 capsule (600 mg) by mouth daily 90 capsule 3     apixaban ANTICOAGULANT (ELIQUIS) 5 MG tablet Take 1 tablet (5 mg) by mouth 2 times daily. 180  tablet 1     atorvastatin (LIPITOR) 10 MG tablet Take 1 tablet (10 mg) by mouth daily 90 tablet 3     carboxymethylcellulose (REFRESH LIQUIGEL) 1 % ophthalmic solution Place 1 drop into both eyes 4 times daily 15 mL 11     cetirizine (ZYRTEC) 10 MG tablet Take 1 tablet (10 mg) by mouth every morning. 90 tablet 1     clobetasol (TEMOVATE) 0.05 % external ointment Apply topically 2 times daily. 15 g 2     Continuous Glucose Sensor (DEXCOM G7 SENSOR) MISC Change every 10 days. 3 each 5     cyanocobalamin (VITAMIN B-12) 500 MCG tablet Take 1 tablet (500 mcg) by mouth daily 90 tablet 3     cyclobenzaprine (FLEXERIL) 5 MG tablet Take 1 tablet (5 mg) by mouth 2 times daily as needed for muscle spasms. 60 tablet 0     cycloSPORINE (RESTASIS) 0.05 % ophthalmic emulsion Place 1 drop into both eyes 2 times daily. 60 each 11     DULoxetine (CYMBALTA) 60 MG capsule Take 1 capsule (60 mg) by mouth 2 times daily. 60 capsule 3     empagliflozin (JARDIANCE) 25 MG TABS tablet Take 1 tablet (25 mg) by mouth every morning. 90 tablet 3     EPINEPHrine (ANY BX GENERIC EQUIV) 0.3 MG/0.3ML injection 2-pack Inject 0.3 mLs (0.3 mg) into the muscle as needed for anaphylaxis (related to bee stings) May repeat one time in 5-15 minutes if response to initial dose is inadequate. 2 each 1     fluconazole (DIFLUCAN) 150 MG tablet Take 1 tablet (150 mg) by mouth every 3 days. 3 tablet 0     fluticasone (FLONASE) 50 MCG/ACT nasal spray Spray 1 spray into both nostrils 2 times daily Use at night before bed 15.8 mL 3     Fluticasone-Umeclidin-Vilanterol (TRELEGY ELLIPTA) 200-62.5-25 MCG/ACT oral inhaler USE 1 INHALATION DAILY 28 each 5     Glucagon (GVOKE HYPOPEN) 1 MG/0.2ML pen Inject the contents of 1 device under the skin into lower abdomen, outer thigh, or outer upper arm as needed for hypoglycemia. If no response after 15 minutes, additional 1 mg dose from a new device may be injected while waiting for emergency assistance. 0.4 mL 1      GLUCOSAMINE-CHONDROITIN -400 MG tablet TAKE 1 TABLET DAILY 90 tablet 3     insulin glargine (LANTUS PEN) 100 UNIT/ML pen Inject 6 Units subcutaneously at bedtime. 15 mL 1     insulin pen needle (32G X 4 MM) 32G X 4 MM miscellaneous Use 4 pen needles daily or as directed. 360 each 3     levalbuterol (XOPENEX) 1.25 MG/3ML neb solution Take 3 mLs (1.25 mg) by nebulization every 4 hours as needed for wheezing.       losartan (COZAAR) 50 MG tablet Take 1 tablet (50 mg) by mouth daily. 90 tablet 3     miconazole (MICATIN) 2 % external powder Apply topically as needed for itching or other. Please show least expensive option for purchase. 43 g 0     NOVOLOG FLEXPEN 100 UNIT/ML soln Inject 4-7  units four times daily subcutaneous approx 30 units daily 30 mL 1     Nutritional Supplements (GLUCERNA SHAKE) LIQD Take 1 Can by mouth 3 times daily (with meals).       omega-3 acid ethyl esters (LOVAZA) 1 g capsule Take 2 capsules (2 g) by mouth 2 times daily 360 capsule 3     omeprazole (PRILOSEC) 20 MG DR capsule Take 1 capsule (20 mg) by mouth 2 times daily. 180 capsule 3     oxyCODONE (OXY-IR) 5 MG capsule Take 5 mg by mouth every 4 hours as needed for severe pain.       polyethylene glycol (MIRALAX) 17 GM/Dose powder Take 17 g by mouth daily       polyethylene glycol-propylene glycol (SYSTANE) 0.4-0.3 % SOLN ophthalmic solution Place 1 drop into both eyes 4 times daily 5 mL 11     predniSONE (DELTASONE) 5 MG tablet        pregabalin (LYRICA) 150 MG capsule Take 1 capsule (150 mg) by mouth 2 times daily TAKE 1 CAPSULE(150 MG) BY MOUTH TWICE DAILY 180 capsule 3     roflumilast (DALIRESP) 500 MCG TABS tablet Take 1 tablet (500 mcg) by mouth every morning. 90 tablet 3     traMADol (ULTRAM) 50 MG tablet Take 1 tablet (50 mg) by mouth every 6 hours as needed for severe pain. 50 tablet 0     Vitamin D, Cholecalciferol, 25 MCG (1000 UT) CAPS Take 25 mcg by mouth daily. 200 capsule 1     No current facility-administered  medications for this visit.       Attestation signed by Brad Rodriguez MD at 5/14/2025  9:11 PM:  I have discussed Jenn's case with Dr. Cruz- Pulmonary & Critical Care Fellow; personally reviewed the patient's available radiographic and laboratory data and examined her.  I agree with the findings, assessment and recommendations as outlined below by Dr. Cruz.       Patient is 69F s/p radiation to right upper chest with residual consolidative finding favor radiation fibrosis. We will identify her TB QFT status and if positive we will consider bronchoscopy with BAL of the RUL site. In regards to her COPD, which is advanced at GOLD E, she will need to maintain roflimulast and triple inhaler therapy per below.     Total time spent today on patient encounter, documentation, review of chart and care coordination was 30 minutes.     Brad Rodriguez M.D.      Again, thank you for allowing me to participate in the care of your patient.        Sincerely,        William Cruz MD    Electronically signed

## 2025-05-14 NOTE — PATIENT INSTRUCTIONS
Thanks for coming in. As discussed,   - Quantgold  blood work  - Continue Trelegy, roflumilast, pred + doxycycline   - Follow up in 6 mn

## 2025-05-16 ENCOUNTER — RESULTS FOLLOW-UP (OUTPATIENT)
Dept: PULMONOLOGY | Facility: CLINIC | Age: 70
End: 2025-05-16

## 2025-06-04 NOTE — PROGRESS NOTES
[facility]  [address] [address]  Phone: [phone]  Fax: [phone]    PATIENT NAME: Jenn Apariico  MRN # [id number]   DATE OF VISIT: April 3, 2025  YOB: 1955     CANCER TYPE: Lung adenocarcinoma  STAGE: IA2 [id number] poorly diff adeno RLL, then [id number] IA2 suspected NSCLC RUL, medically inoperable      ECOG PS: 2 (COPD, back)     PD-L1: N/A  Lung panel: N/A  NGS: N/A    ASSESSMENT AND PLAN  NSCLC, adeno, RUL, L2 lesion: See extensive prior notes but in summary, peripheral RLL looks a little better on current CT. The new medial RUL nodule is resolved, indicating more likely to have been a mucus plug vs inflammatory. CT CAP in 5-6 months with labs. Will look at L2 area again next time.      Hemoptysis: Resolved.     Weight loss: Not discussed today     LBP: Continues to be pretty bad, working with NP Overkamp on this. Has a lift bed now, helping. Mobility greatly reduced.      COPD: O2 dependent and intermittent exacerbations     Remainder of issues not actively discussed today  DM2: Seeing Dr. Shabazz in Endocrine  Dysphagia: Met with Dr. Briseno in GI 7/18/23, has had pretty extensive evaluation before, last discussed in 2023.  Peripheral neuropathy: Mostly driven by DM     The longitudinal plan of care for the condition(s) below were addressed during this visit. Due to the added complexity in care, I will continue to support Jenn in the subsequent management of this condition(s) and with the ongoing continuity of care of this condition(s): adenocarcinoma of the lung      30 minutes spent by me on the date of the encounter doing chart review, review of test results, interpretation of tests, patient visit, documentation, orders     Zarina Leung MD  Associate Professor of Medicine  Hematology, Oncology and Transplantation    SUBJECTIVE  Jenn returns for close interim follow up of lung adeno and the R peripheral lung lesion that looked  worse last number of months, too risky to bx.   Back pain still pretty bad. Difficult to get around. Daughters do shopping for her nowadays. Has a lift bed, which has helped a lot.  Brother passed away from cancer last month. Lived here in the [facility] so she was able to see him a few days before he passed away.     CANCER SUMMARY  12/24/15 PET/CT  1/13/15 CT lung bx. Adenocarcinoma  2/8/16 R thoracotomy, RLL lobectomy (Dr. Cunha). Adenocarcinoma, 1.1 cm, assoicated with atypical adenomatous hyperplasia  10/18/19 CTA for shortness of breath. New 1.3 cm RUL nodule  11/2019 PET/CT. 1.1 cm RUL hypermetabolic nodule (SUV 12.6)  12/26/19 Brain MRI negative for mets  1/14~1/28/20 SBRT to RUL nodule, 5000 cGy, every other day, 1000 cGy (Dr. Currie [place] [facility] [place] No bx due to O2 dependence and too much risk  8/19/20 First visit with me   11/29/21 CT chest. New 10 mm RUL nodule  1/11/22 CT chest. New 7.2 mm RUL nodule, unchanged RUL nodules  3/31/22 CT chest. New RUL nodule from Jan 2022 7-->10 mm, new 5 mm ROBERT nodule  5/20~5/24/22 [facility] for COPD exacerbation.   6/24/22 CT chest non contrast.   8/16/22 EGD. 3 cm hiatal hernia, o/w normal  8/31/22 CT chest. 2.5 x 1.3 cm R midlung subpleural nodularity (4:98) previously 2.3 x 1.1 cm, slightly increased solid component   10/5/22 PET/CT. 2 x 1.3 cm irregular opacity posterior RUL (SUV 2.46), 2.4 x 0.8 cm linear opacity (SUV 4.76); there was bronchiectasia on prior imaging. Stable 1.1 x 0.8 cm non FDG avid RUL opacity and unchanged 4 mm posterior RUL nodule. No adenopathy. Inferior right breast uptake (SUV 6.09) likely infectious or inflammatory.   2/3~2/9/23 [facility] for COPD exacerbation  2/4/23 CTA during hospitalization. No significant change in 2.3 x 1.5 cm spiculated nodule in the right upper lobe (series 7 image 94) with surrounding linear parenchymal opacities and subtle nodularity  2/20~2/24/23 [facility] for COPD exacerbation.   3/1/23 CT chest non contrast. 2.3 cm posterior RUL  irregular nodule unchanged from 12/1/22 however slightly increased soft tissue component compared to 10/5/22 PET. Stable ROBERT mucous plugging with micronodularity.  3/22/23 CT chest. Stable compared to 3/1/23  5/25/23 CT chest abd w/contrast. Unchanged 1.3 x 0.5 cm R apical nodule, unchnaged 2.4 x 1.3 cm R upper nodular consolidation. New nodular/masslike area of consolidation anteriorly and laterally measuring 3.3 x 1.4 cm, not present on CT 3/22/23. No adenopathy. Consider PET or close surveillance CT in 3 months  7/19/23 Bronch, EBUS (Dr. Garcia). 11L negative, scant cells. 4L unsatisfactory for eval. 8 negative, scant cellularity, 4R unsatisfactory, 11R negative, scan cellularity, RUL negative, showed acute inflammation, limited by scant cellularity.  5/15/24 CT CAP. Slightly enlarging RUL consolidation, several new indeterminate RML nodules, other stable nodules, new L2 lytic lesion  6/11/24 PET. Poor quality and low sensitivity due to hyperglycemia. New 0.8 x 1.0 cm R apical nodule (SUV 3.1), interval decreased size/uptake of patchy RUL GGO (SUV 2.4, previously 6.1). Few scattered nodules in both upper lobes with near complete resolution of FDG avidity, mild uptake associated with the lucent focus along the superior endplate of L2 (SUV 3.8).  6/26/24 MRI L spine. T1/2 hypointense, STIR hyperintense and enhancing lytic lesion L2 c/w mets. Mod neural foramenal steonosis bilaterally L3- and R L4-5.  7/22/24 L2 bx (IR). Path: negative for malignancy, showed fragments of benign bone and marrow tissue  8/14~8/26/2024 [facility] for A-fib with RVR, acute on chronic lower back pain, DARIO, new LLE weakness and numbness    PAST MEDICAL HISTORY  Lung adenocarcinoma  DM2 with neuropathy  HCV IA, undetectable in 2019, treated  COPD. O2 dependent since late 2018. PFT 11/26/19. FEV1 0.64 (30%), FVC 1.69 (63%), DLCOunc 9.8 (38%).  HTN  H/o ITP  H/o disk herniation  Ankle surgery  Median neuropathy R  H/o sessile colon polyps 2014, h/o  adenomas before that. [place] [facility] [place] Sessile serated adenoma x 1, tubular adenoma x 3  H/o chronic LBP  Dyslipidemia  Cataracts    CURRENT OUTPATIENT MEDICATIONS  Reviewed    ALLERGIES  Allergies   Allergen Reactions    Aspirin      325mg     Bee Venom Anaphylaxis    Penicillins Hives    Acetaminophen GI Disturbance    Azithromycin Dizziness    Colon Care     Interferons Dermatitis    Metformin GI Disturbance      PHYSICAL EXAM  There were no vitals taken for this visit.  GEN: NAD  HEENT: EOMI, no icterus, injection or pallor  NEURO: alert    Remainder of physical exam deferred due to public health emergency and limitations of video visit.    LABORATORY AND IMAGING STUDIES    Labs 4/1/25 independently reviewed and interpreted by me - CBC pd and CMP - both acceptable     CT Chest Abdomen w Contrast  Narrative: EXAMINATION: CT CHEST ABDOMEN W CONTRAST 4/1/2025 9:39 AM    TECHNIQUE: Helical CT images from the thoracic inlet through the iliac  crests were obtained with IV contrast. Contrast dose: Isovue 370 86 mL    COMPARISON: CT 12/19/2024, 9/30/2024, 8/24/2024    HISTORY: Restage lung adeno, chronic worrisome RUL findings. New RUL  nodule on Dec 2024 CT, re-evaluate.    FINDINGS:    CHEST:  LUNGS: Stable postsurgical changes of right lower lobectomy. Small  amount of debris present in the right mainstem bronchus. No  pneumothorax or pleural effusion. Upper lobe predominant advanced  emphysematous changes with nodular scarring at the apices. Decreased  size of the irregular opacity in the medial right upper lobe, now  measuring 5 mm, previously 13 mm (4/81). No significant change of the  irregular plate-like opacity in the posterior right upper lobe  measuring approximately 3.1 x 2.4 cm (4/97, 7/69). Additional  scattered sub-6 mm pulmonary nodules appear stable. Similar small  focus of tree-in-bud nodularity in the posterior left upper lobe,  likely sequela of prior infection. No definite new or  enlarging  pulmonary nodules identified.     MEDIASTINUM: Heart size is within normal limits. No pericardial  effusion. Ascending aorta and main pulmonary artery diameters are  within normal limits. Mild atherosclerotic calcifications of the  thoracic aorta. Multivessel coronary artery calcifications.  Mediastinal clips. No suspicious mediastinal, hilar, or axillary lymph  nodes. Visualized thyroid and esophagus are unremarkable.    ABDOMEN:  LIVER: No suspicious focal hepatic lesion.    BILIARY: No calcified intraluminal gallstone. No intrahepatic or  extrahepatic biliary ductal dilation.    PANCREAS: No focal pancreatic lesion. The main pancreatic duct is not  dilated.    SPLEEN: Within normal limits.    ADRENAL GLANDS: No focal adrenal nodule.    URINARY TRACT: No suspicious renal lesion. No hydronephrosis or  hydroureter. No obstructing renal stone.    STOMACH: Within normal limits.    BOWEL: Normal caliber large and small bowel. No abnormal bowel wall  thickening or enhancement. Mild colonic diverticulosis without  evidence of acute diverticulitis.     PERITONEUM/FLUID: No ascites.    VESSELS: No aneurysmal dilatation of the abdominal aorta.  Mild-moderate atherosclerotic calcifications of the abdominal aorta  and common iliac arteries. The portal, splenic, and superior  mesenteric veins are patent. The origins of the celiac and superior  mesenteric arteries are patent.    LYMPH NODES: No lymphadenopathy.    BONES/SOFT TISSUES: No aggressive osseous lesions. Stable Schmorl's  endplate deformity of L2. Bones are osteopenic again with multilevel  degenerative changes throughout the visualized spine. Small  fat-containing umbilical hernia. Stable subcutaneous nodule along the  medial left breast.  Impression: IMPRESSION:   1. No significant change in size of the irregular plate-like opacity  in the posterior right upper lobe compared to 12/19/2024.  2. Previously noted new irregular opacity in the medial right  upper  lobe on 12/19/2024 CT has significantly decreased in size, favoring an  infectious/inflammatory process. Recommend continued attention on  follow-up.  3. No suspicious findings in the abdomen.    I have personally reviewed the examination and initial interpretation  and I agree with the findings.    RICARDA EUBANKS MD         SYSTEM ID:  [id number]     CT chest abd personally reviewed and interpreted by me  RUL opacity definitely smaller than Dec and Sept 2024  Agree that medial RUL nodule that was new in Dec 2024 is now gone    Virtual Visit Details  Type of service:  Video Visit   Video Start Time: 9:25 AM  Video End Time:9:34 AM  Distant Location (provider location):  Off-site  Platform used for Video Visit: [employer]            Stable

## 2025-06-14 DIAGNOSIS — I48.0 PAROXYSMAL ATRIAL FIBRILLATION (H): ICD-10-CM

## 2025-06-16 NOTE — TELEPHONE ENCOUNTER
Last Written Prescription:   Disp Refills Start End MARIXA   apixaban ANTICOAGULANT (ELIQUIS) 5 MG tablet 180 tablet 1 10/30/2024 -- No   Sig - Route: Take 1 tablet (5 mg) by mouth 2 times daily. - Oral     ----------------------  Last Visit Date:   1/16/2025  [facility] [place]    Future Visit Date: 0  ----------------------      Refill decision: Medication refilled per  Medication Refill in Ambulatory Care  policy.      Refill decision: Medication unable to be refilled by RN due to: Verification - order discrepancy, clarification needed, Sig modification needed: weight verification         Request from pharmacy:  Requested Prescriptions   Pending Prescriptions Disp Refills    ELIQUIS ANTICOAGULANT 5 MG tablet [Pharmacy Med Name: ELIQUIS 5MG TABLETS] 180 tablet 1     Sig: TAKE 1 TABLET(5 MG) BY MOUTH TWICE DAILY       Anticoagulant Agents Failed - 6/16/2025  2:12 PM        Failed - Weight is greater than 60 kg for the past year     Wt Readings from Last 3 Encounters:   05/14/25 74.6 kg (164 lb 6.4 oz)   04/15/25 73.5 kg (162 lb)   04/03/25 71.7 kg (158 lb)             Passed - Normal Platelets on file in past 12 months     Recent Labs   Lab Test 04/01/25  0834                  Passed - Medication is active on med list and the sig matches. RN to manually verify dose and sig if red X/fail.     If the protocol passes (green check), you do not need to verify med dose and sig.    A prescription matches if they are the same clinical intention.    For Example: once daily and every morning are the same.    The protocol can not identify upper and lower case letters as matching and will fail.     For Example: Take 1 tablet (50 mg) by mouth daily     TAKE 1 TABLET (50 MG) BY MOUTH DAILY    For all fails (red x), verify dose and sig.    If the refill does match what is on file, the RN can still proceed to approve the refill request.       If they do not match, route to the appropriate  provider.             Passed - Creatinine Clearance greater than 50 ml/min on file in past 12 mos     No lab results found.          Passed - GFR on file in the past 12 months and most recent GFR is normal        Passed - Recent (6 month) or future (90 days) visit with the authorizing provider's specialty (provided they have been seen in the past 9 months)        Passed - Medication indicated for associated diagnosis     Medication is associated with one or more of the following diagnoses:  Atrial fibrillation  Deep venous thrombosis  Heparin-induced thrombocytopenia  Pulmonary embolism  Venous thromboembolism  H/O: Pulmonary embolus  Atrial flutter  Coronary artery finding  Transient cerebral ischemia  Percutaneous transluminal coronary angioplasty  Stable angina  Intermittent claudication  Arteriosclerotic vascular disease          Passed - Patient is 18 years of age or older        Passed - No active pregnancy on record        Passed - No positive pregnancy test within past 12 months

## 2025-06-16 NOTE — TELEPHONE ENCOUNTER
Wt Readings from Last 3 Encounters:   05/14/25 74.6 kg (164 lb 6.4 oz)   04/15/25 73.5 kg (162 lb)   04/03/25 71.7 kg (158 lb)

## 2025-06-18 RX ORDER — APIXABAN 5 MG/1
5 TABLET, FILM COATED ORAL 2 TIMES DAILY
Qty: 180 TABLET | Refills: 2 | Status: SHIPPED | OUTPATIENT
Start: 2025-06-18

## 2025-06-26 ENCOUNTER — TELEPHONE (OUTPATIENT)
Dept: PULMONOLOGY | Facility: CLINIC | Age: 70
End: 2025-06-26
Payer: COMMERCIAL

## 2025-06-26 NOTE — TELEPHONE ENCOUNTER
Left Voicemail (1st Attempt) and Sent Mychart (1st Attempt) for the patient to call back and schedule the following:    Appointment type: rpulm  Provider: fellows  Return date: Nov 2025  Specialty phone number: [phone]  Additional appointment(s) needed: na  Additonal Notes: na

## 2025-07-02 DIAGNOSIS — G89.29 OTHER CHRONIC PAIN: ICD-10-CM

## 2025-07-02 RX ORDER — DULOXETIN HYDROCHLORIDE 60 MG/1
60 CAPSULE, DELAYED RELEASE ORAL 2 TIMES DAILY
Qty: 60 CAPSULE | Refills: 3 | Status: SHIPPED | OUTPATIENT
Start: 2025-07-02

## 2025-07-02 NOTE — TELEPHONE ENCOUNTER
Medication:duloxetine  Last prescribing provider:Dr. Leung  Last clinic visit date: 4/1/2025 Dr. Leung  Recommendations for requested medication:na/  Any other pertinent information:routed to last provider Dr. Leung

## 2025-07-17 ENCOUNTER — DOCUMENTATION ONLY (OUTPATIENT)
Dept: INTERNAL MEDICINE | Facility: CLINIC | Age: 70
End: 2025-07-17
Payer: COMMERCIAL

## 2025-07-17 NOTE — PROGRESS NOTES
Type of Form Received:[facility] 5/17-7/15    Form Received (Date) 7/15/2025   Form Filled out No   Placed in provider folder Yes

## 2025-07-23 DIAGNOSIS — J30.2 SEASONAL ALLERGIC RHINITIS, UNSPECIFIED TRIGGER: ICD-10-CM

## 2025-07-23 NOTE — PROGRESS NOTES
Endocrinology Clinic Visit 7/24/2025    NAME:  Jenn Aparicio  PCP:  [email or web address]  MRN:  [id number]  Reason for Consult:  Follow up visit for diabetes management  Requesting Provider:  No ref. provider found     Assessment/Plan    #Type 2 Diabetes mellitus, not on goal   A1c today:9.6    CGM data: 59% TIR,32% high, 9%very high  She stopped Mounjaro due to intolerance. Metformin caused intolerance. Glipizide caused hypoglycemia.  Her most recent CGM data suggests inadequate diabetes control. She has high levels mostly postprandial. No hypoglycemia based on last CGM. We plan to add glargine 6 units and continue with same dose of novolog     2.  Diabetes preventive care.  -Abnormal foot exam on 10/27/2023, foot care discussed at that visit, management of diabetic neuropathy as below  -Microalbuminuria persistent on labs in 8/2023; on SGLT2 inhibitor, Losartan  -Eye exam on 4/5/2024  showed no diabetic retinopathy;      3.  Diabetic neuropathy.  On Lyrica and vitamin B12 500 mg daily.  Also taking ALA supplement.      4.  Dyslipidemia.  On atorvastatin since 1/2024.  Continue current regimen without changes.     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    Duration of Diabetes:between 15-20 years  -She is uncertain when she was diagnosed with diabetes: Progress Notes as far back as 2008 mention diagnosis of type 2 diabetes.  She previously was seen in endocrinology at [facility].     Hospitalization:None    Previous Diabetes medications:  Metformin, developed diarrhea, has tried both extended release and short acting formulations  -Januvia appeared on medication list previously, does not recall side effect with this.  Treated with Ozempic, discontinued in  due to nausea, headache and muscle cramping.  -Insulin, recalls developing hypoglycemia prompting discontinuation  -Glipizide was prescribed but discontinued soon thereafter due to hypoglycemia.   -Mounjaro 5 mg weekly--there was 1 month lapse in treatment with Mounjaro when she was transitioning from 2.5 mg to 5 mg dose; she just took second dose of Mounjaro 5 mg weekly.  Has some nausea with eating. Used it for few weeks and the stopped because abdominal upset and loss of appetite.      Current treatment strategy:  Jardiance 25 mg daily  Novolog 5 units 3-4 times with meals and night if she eats-->started 1 month ago    Lab Results   Component Value Date    A1C 7.6 2024    A1C 8.7 2024    A1C 8.1 2024    A1C 7.0 2021    A1C 6.8 2020       Interval Events/Concerns:   She stopped Mounjero since she could not tolerate it. Novolog added her regimen 1 month ago.    Wt Readings from Last 3 Encounters:   25 77.1 kg (170 lb)   25 74.6 kg (164 lb 6.4 oz)   04/15/25 73.5 kg (162 lb)     BP Readings from Last 3 Encounters:   25 (!) 147/75   25 (!) 150/75   04/15/25 110/70       He eats 1-2 meals and snacks    Glucose Monitoring Information:Since Freestyle Marti was causing issues (not adhering to skin), she has been using Dexcom G7 CGM.     Exercise: Can not exercise since she has chronic hypoxemic hypercapnic respiratory failure   Active: 64%  AB  GMI:n/a  CV:n/a  Very High: 9%  High: 32%  TIR: 59%  Low: 0%  Very Low: 0%      Per review of records, he has had diabetes 15-20 years ago  Diabetes Complication Screening:  -Hypoglycemia: YES, very rarely. Has hypoglycemia  awareness.  -Retinopathy: NO. Last Eye Exam: November 2024. Patient receives eye care Internal  -Nephropathy: No. Creatinine 0.5 and GFR>90. Urine albumin 20 on 10/2024  -Neuropathy: Diabetic neuropathy.  On Lyrica. Last foot exam:1/2/25  -BP: At Goal. On losartan 50.  -ASCVD: NO. 11/27/23LDL Measured 77  On atorvastatin 10 mg.  -ASA: NO    Current Outpatient Medications   Medication Sig Dispense Refill    acetylcysteine (MUCOMYST) 10 % nebulizer solution Inhale 4 mLs into the lungs 4 times daily as needed for mucolysis/respiratory distress 30 mL 5    albuterol (PROAIR HFA/PROVENTIL HFA/VENTOLIN HFA) 108 (90 Base) MCG/ACT inhaler USE 1 OR 2 INHALATIONS EVERY 4 HOURS AS NEEDED 17 g 11    albuterol (PROVENTIL) (2.5 MG/3ML) 0.083% neb solution Take 1 vial (2.5 mg) by nebulization 4 times daily 360 mL 5    alpha-lipoic acid 600 MG capsule Take 1 capsule (600 mg) by mouth daily 90 capsule 3    apixaban ANTICOAGULANT (ELIQUIS ANTICOAGULANT) 5 MG tablet TAKE 1 TABLET(5 MG) BY MOUTH TWICE DAILY 180 tablet 2    atorvastatin (LIPITOR) 10 MG tablet Take 1 tablet (10 mg) by mouth daily 90 tablet 3    carboxymethylcellulose (REFRESH LIQUIGEL) 1 % ophthalmic solution Place 1 drop into both eyes 4 times daily 15 mL 11    cetirizine (ZYRTEC) 10 MG tablet Take 1 tablet (10 mg) by mouth every morning. 90 tablet 1    clobetasol (TEMOVATE) 0.05 % external ointment Apply topically 2 times daily. 15 g 2    Continuous Glucose Sensor (DEXCOM G7 SENSOR) MISC Change every 10 days. 3 each 5    cyanocobalamin (VITAMIN B-12) 500 MCG tablet Take 1 tablet (500 mcg) by mouth daily 90 tablet 3    cyclobenzaprine (FLEXERIL) 5 MG tablet Take 1 tablet (5 mg) by mouth 2 times daily as needed for muscle spasms. 60 tablet 0    cycloSPORINE (RESTASIS) 0.05 % ophthalmic emulsion Place 1 drop into both eyes 2 times daily. 60 each 11    doxycycline hyclate (VIBRA-TABS) 100 MG tablet Take 1 tablet (100 mg) by mouth 2 times daily. For COPD flare 10 tablet 3     DULoxetine (CYMBALTA) 60 MG capsule Take 1 capsule (60 mg) by mouth 2 times daily. 60 capsule 3    empagliflozin (JARDIANCE) 25 MG TABS tablet Take 1 tablet (25 mg) by mouth every morning. 90 tablet 3    EPINEPHrine (ANY BX GENERIC EQUIV) 0.3 MG/0.3ML injection 2-pack Inject 0.3 mLs (0.3 mg) into the muscle as needed for anaphylaxis (related to bee stings) May repeat one time in 5-15 minutes if response to initial dose is inadequate. 2 each 1    fluconazole (DIFLUCAN) 150 MG tablet Take 1 tablet (150 mg) by mouth every 3 days. 3 tablet 0    fluticasone (FLONASE) 50 MCG/ACT nasal spray Spray 1 spray into both nostrils 2 times daily Use at night before bed 15.8 mL 3    Fluticasone-Umeclidin-Vilanterol (TRELEGY ELLIPTA) 200-62.5-25 MCG/ACT oral inhaler USE 1 INHALATION DAILY 28 each 5    Glucagon (GVOKE HYPOPEN) 1 MG/0.2ML pen Inject the contents of 1 device under the skin into lower abdomen, outer thigh, or outer upper arm as needed for hypoglycemia. If no response after 15 minutes, additional 1 mg dose from a new device may be injected while waiting for emergency assistance. 0.4 mL 1    GLUCOSAMINE-CHONDROITIN -400 MG tablet TAKE 1 TABLET DAILY 90 tablet 3    insulin glargine (LANTUS PEN) 100 UNIT/ML pen Inject 6 Units subcutaneously at bedtime. 15 mL 1    insulin pen needle (32G X 4 MM) 32G X 4 MM miscellaneous Use 4 pen needles daily or as directed. 360 each 3    levalbuterol (XOPENEX) 1.25 MG/3ML neb solution Take 3 mLs (1.25 mg) by nebulization every 4 hours as needed for wheezing.      losartan (COZAAR) 50 MG tablet Take 1 tablet (50 mg) by mouth daily. 90 tablet 3    miconazole (MICATIN) 2 % external powder Apply topically as needed for itching or other. Please show least expensive option for purchase. 43 g 0    NOVOLOG FLEXPEN 100 UNIT/ML soln Inject 4-7  units four times daily subcutaneous approx 30 units daily 30 mL 1    Nutritional Supplements (GLUCERNA SHAKE) LIQD Take 1 Can by mouth 3 times daily  (with meals).      omega-3 acid ethyl esters (LOVAZA) 1 g capsule Take 2 capsules (2 g) by mouth 2 times daily 360 capsule 3    omeprazole (PRILOSEC) 20 MG DR capsule Take 1 capsule (20 mg) by mouth 2 times daily. 180 capsule 3    oxyCODONE (OXY-IR) 5 MG capsule Take 5 mg by mouth every 4 hours as needed for severe pain.      polyethylene glycol (MIRALAX) 17 GM/Dose powder Take 17 g by mouth daily      polyethylene glycol-propylene glycol (SYSTANE) 0.4-0.3 % SOLN ophthalmic solution Place 1 drop into both eyes 4 times daily 5 mL 11    predniSONE (DELTASONE) 20 MG tablet Take 40 mg daily for 5 days if needed for treatment of a COPD exacerbation, use alongside doxycycline 10 tablet 2    pregabalin (LYRICA) 150 MG capsule Take 1 capsule (150 mg) by mouth 2 times daily TAKE 1 CAPSULE(150 MG) BY MOUTH TWICE DAILY 180 capsule 3    roflumilast (DALIRESP) 500 MCG TABS tablet Take 1 tablet (500 mcg) by mouth every morning. 90 tablet 3    traMADol (ULTRAM) 50 MG tablet Take 1 tablet (50 mg) by mouth every 6 hours as needed for severe pain. 50 tablet 0    Vitamin D, Cholecalciferol, 25 MCG (1000 UT) CAPS Take 25 mcg by mouth daily. 200 capsule 1    predniSONE (DELTASONE) 5 MG tablet  (Patient not taking: Reported on 7/24/2025)       Histories reviewed and updated in Epic.  Past Medical History:   Diagnosis Date    Adenocarcinoma, lung (H)     Asthma     Chronic left-sided low back pain with left-sided sciatica 08/16/2024    Chronic respiratory failure with hypoxia (H) 08/16/2024    Ectopic pregnancy     Esophageal reflux     Failure to thrive in adult 08/14/2024    Pulmonary emphysema (H)     Very severe FEV1<30% predicted    Type II diabetes mellitus (H)        Past Surgical History:   Procedure Laterality Date    2/8/16                R thoracotomy, RLL lobectomy (Dr. Cunha). Adenocarcinoma, 1.1 cm, assoicated with atypical adenomatous hyperplasia Right 02/08/2016    R thoracotomy, RLL lobectomy (Dr. Cunha).  Adenocarcinoma, 1.1 cm, assoicated with atypical adenomatous hyperplasia    BRONCHOSCOPY, WITH BIOPSY, ROBOT ASSISTED N/A 07/19/2023    Procedure: robot assisted Ion BRONCHOSCOPY, WITH BIOPSY;  Surgeon: Patria Garcia MD;  Location: [facility] OR    CATARACT IOL, RT/LT      ENDOBRONCHIAL ULTRASOUND FLEXIBLE N/A 07/19/2023    Procedure: Endobronchial ultrasound flexible;  Surgeon: Patria Garcia MD;  Location: [facility] OR    ESOPHAGOSCOPY, GASTROSCOPY, DUODENOSCOPY (EGD), COMBINED N/A 08/16/2022    Procedure: ESOPHAGOGASTRODUODENOSCOPY (EGD);  Surgeon: Travis Briseno MD;  Location:  GI    ESOPHAGOSCOPY, GASTROSCOPY, DUODENOSCOPY (EGD), COMBINED N/A 08/08/2023    Procedure: ESOPHAGOGASTRODUODENOSCOPY, WITH BIOPSY;  Surgeon: Chao Rodrigues DO;  Location: [facility] GI    INJECT EPIDURAL TRANSFORAMINAL LUMBAR / SACRAL EA ADDITIONAL LEVEL Bilateral 11/18/2024    Procedure: Bilateral L4-5 transforaminal epidural steroid injection;  Surgeon: Bony An MD;  Location: [facility] [place] OR    ORTHOPEDIC SURGERY      PHACOEMULSIFICATION CLEAR CORNEA WITH STANDARD INTRAOCULAR LENS IMPLANT Left 08/24/2023    Procedure: LEFT EYE PHACOEMULSIFICATION, CATARACT, WITH INTRAOCULAR LENS IMPLANT;  Surgeon: Re Keita MD;  Location: [facility] [place] OR    PHACOEMULSIFICATION CLEAR CORNEA WITH STANDARD INTRAOCULAR LENS IMPLANT Right 09/05/2023    Procedure: RIGHT EYE PHACOEMULSIFICATION, CATARACT, WITH INTRAOCULAR LENS IMPLANT;  Surgeon: Re Keita MD;  Location: [facility] [place] OR       Allergies:  Aspirin, Bee venom, Penicillins, Acetaminophen, Azithromycin, Colon care, Interferons, and Metformin    Family History   Problem Relation Age of Onset    Thyroid Disease Mother     Cerebrovascular Disease Mother     Hypertension Mother     Hypertension Father     Glaucoma Father     Cancer Sister     Lung Cancer Sister     Diabetes Brother     Cancer Brother     Diabetes Brother     Cancer Brother     Diabetes Brother     Deep Vein Thrombosis (DVT) Daughter     Depression  "Daughter     Alcohol/Drug Other         self    Diabetes Other         self    Thyroid Disease Other         self    Asthma Other         self    Macular Degeneration No family hx of     Anesthesia Reaction No family hx of      Social History     Tobacco Use    Smoking status: Former     Current packs/day: 0.00     Types: Cigarettes     Quit date:      Years since quittin.5     Passive exposure: Past    Smokeless tobacco: Never    Tobacco comments:     2023 Patient using 14 mg patch, wants to have prescription for Nicotrol inhaler, took workbook   Substance Use Topics    Alcohol use: Not Currently       Physical examination:  Vitals: BP (!) 147/75   Pulse 99   Wt 77.1 kg (170 lb)   SpO2 94%   BMI 28.29 kg/m      BMIE= Body mass index is 28.29 kg/m .  Exam:  Constitutional: healthy, alert, no acute distress  Head: Normocephalic. No masses, lesions, no exophthalmos/proptosis  ENT: normal thyroid, no palpable nodules, no cervical lymph nodes  Respiratory: nonlabored  Gastrointestinal: Abdomen soft, non-tender.  Musculoskeletal: extremities normal- no gross deformities noted, gait normal and normal muscle tone  Skin: no suspicious lesions or rashes  Neurologic: Gait normal. sensation grossly intact  Psychiatric: mentation appears normal, calm    Foot Exam: normal DP and PT pulses, no trophic changes or ulcerative lesions, and abnormal sensory exam    Most Recent Laboratory Tests:  @LABRCNTIP(hgb:1)@  @LABRCNTIP(A1c:1)@  @LABRCNTIP(cr:1)@  @LABRCNTIP(g,bgm:6)@  Recent Labs   Lab Test 23  0833 10/26/23  0912   CHOL 166 220*   HDL 76 55   LDL 77 146*   TRIG 64 96     Lab Results   Component Value Date    MICROL 158.0 2023     No results found for: \"MICROALBUMIN\"        "

## 2025-07-24 ENCOUNTER — OFFICE VISIT (OUTPATIENT)
Dept: ENDOCRINOLOGY | Facility: CLINIC | Age: 70
End: 2025-07-24
Payer: COMMERCIAL

## 2025-07-24 VITALS
WEIGHT: 170 LBS | SYSTOLIC BLOOD PRESSURE: 147 MMHG | HEART RATE: 99 BPM | DIASTOLIC BLOOD PRESSURE: 75 MMHG | BODY MASS INDEX: 28.29 KG/M2 | OXYGEN SATURATION: 94 %

## 2025-07-24 DIAGNOSIS — E11.42 DIABETIC POLYNEUROPATHY ASSOCIATED WITH TYPE 2 DIABETES MELLITUS (H): ICD-10-CM

## 2025-07-24 DIAGNOSIS — E11.65 TYPE 2 DIABETES MELLITUS WITH HYPERGLYCEMIA, WITHOUT LONG-TERM CURRENT USE OF INSULIN (H): Primary | ICD-10-CM

## 2025-07-24 LAB
EST. AVERAGE GLUCOSE BLD GHB EST-MCNC: 223 MG/DL
HBA1C MFR BLD: 9.4 %

## 2025-07-24 PROCEDURE — 3078F DIAST BP <80 MM HG: CPT

## 2025-07-24 PROCEDURE — 3077F SYST BP >= 140 MM HG: CPT

## 2025-07-24 PROCEDURE — 1125F AMNT PAIN NOTED PAIN PRSNT: CPT

## 2025-07-24 PROCEDURE — 99214 OFFICE O/P EST MOD 30 MIN: CPT | Mod: GC

## 2025-07-24 PROCEDURE — 83036 HEMOGLOBIN GLYCOSYLATED A1C: CPT | Performed by: PATHOLOGY

## 2025-07-24 ASSESSMENT — PAIN SCALES - GENERAL: PAINLEVEL_OUTOF10: SEVERE PAIN (10)

## 2025-07-24 NOTE — LETTER
7/24/2025       RE: Jenn Aparicio  [address]  [place] [address]     Dear Colleague,    Thank you for referring your patient, Jenn Aparicio, to the [facility] ENDOCRINOLOGY CLINIC [place] at [facility]. Please see a copy of my visit note below.    Endocrinology Clinic Visit 7/24/2025    NAME:  Jenn Aparicio  PCP:  Mitzi Nettles  MRN:  [id number]  Reason for Consult:  Follow up visit for diabetes management  Requesting Provider:  No ref. provider found     Assessment/Plan    #Type 2 Diabetes mellitus, not on goal   A1c today:9.6    CGM data: 59% TIR,32% high, 9%very high  She stopped Mounjaro due to intolerance. Metformin caused intolerance. Glipizide caused hypoglycemia.  Her most recent CGM data suggests inadequate diabetes control. She has high levels mostly postprandial. No hypoglycemia based on last CGM. We plan to add glargine 6 units and continue with same dose of novolog     2.  Diabetes preventive care.  -Abnormal foot exam on 10/27/2023, foot care discussed at that visit, management of diabetic neuropathy as below  -Microalbuminuria persistent on labs in 8/2023; on SGLT2 inhibitor, Losartan  -Eye exam on 4/5/2024  showed no diabetic retinopathy;      3.  Diabetic neuropathy.  On Lyrica and vitamin B12 500 mg daily.  Also taking ALA supplement.      4.  Dyslipidemia.  On atorvastatin since 1/2024.  Continue current regimen without changes.     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    Duration of Diabetes:between 15-20 years  -She is uncertain when she was diagnosed with diabetes: Progress Notes as far back as 2008 mention diagnosis of type 2 diabetes.  She previously was seen in endocrinology at [facility].    Hospitalization:None    Previous Diabetes medications:  Metformin, developed diarrhea, has tried both extended release and short acting formulations  -Alessiouvia appeared on medication list previously, does not recall side effect with this.  Treated with Ozempic, discontinued in 2023 due to nausea, headache and muscle cramping.  -Insulin, recalls developing hypoglycemia prompting discontinuation  -Glipizide was prescribed but discontinued soon thereafter due to hypoglycemia.   -Mounjaro 5 mg weekly--there was 1 month lapse in treatment with Mounjaro when she was transitioning from 2.5 mg to 5 mg dose; she just took second dose of Mounjaro 5 mg weekly.  Has some nausea with eating. Used it for few weeks and the stopped because abdominal upset and loss of appetite.      Current treatment strategy:  Jardiance 25 mg daily  Novolog 5 units 3-4 times with meals and night if she eats-->started 1 month ago    Lab Results   Component Value Date    A1C 7.6 09/30/2024    A1C 8.7 07/09/2024    A1C 8.1 03/07/2024    A1C 7.0 02/24/2021    A1C 6.8 09/08/2020       Interval Events/Concerns:   She stopped Mounjero since she could not tolerate it. Novolog added her regimen 1 month ago.    Wt Readings from Last 3 Encounters:   07/24/25 77.1 kg (170 lb)   05/14/25 74.6 kg (164 lb 6.4 oz)   04/15/25 73.5 kg (162 lb)     BP Readings from Last 3 Encounters:   07/24/25 (!) 147/75   05/14/25 (!) 150/75   04/15/25 110/70       He eats 1-2 meals and snacks    Glucose Monitoring Information:Since Freestyle Marti was causing issues (not adhering to skin), she has been using Dexcom G7 CGM.     Exercise: Can not exercise since she has chronic hypoxemic  hypercapnic respiratory failure   Active: 64%  AB  GMI:n/a  CV:n/a  Very High: 9%  High: 32%  TIR: 59%  Low: 0%  Very Low: 0%      Per review of records, he has had diabetes 15-20 years ago  Diabetes Complication Screening:  -Hypoglycemia: YES, very rarely. Has hypoglycemia awareness.  -Retinopathy: NO. Last Eye Exam: 2024. Patient receives eye care Internal  -Nephropathy: No. Creatinine 0.5 and GFR>90. Urine albumin 20 on 10/2024  -Neuropathy: Diabetic neuropathy.  On Lyrica. Last foot exam:25  -BP: At Goal. On losartan 50.  -ASCVD: NO. 23LDL Measured 77  On atorvastatin 10 mg.  -ASA: NO    Current Outpatient Medications   Medication Sig Dispense Refill     acetylcysteine (MUCOMYST) 10 % nebulizer solution Inhale 4 mLs into the lungs 4 times daily as needed for mucolysis/respiratory distress 30 mL 5     albuterol (PROAIR HFA/PROVENTIL HFA/VENTOLIN HFA) 108 (90 Base) MCG/ACT inhaler USE 1 OR 2 INHALATIONS EVERY 4 HOURS AS NEEDED 17 g 11     albuterol (PROVENTIL) (2.5 MG/3ML) 0.083% neb solution Take 1 vial (2.5 mg) by nebulization 4 times daily 360 mL 5     alpha-lipoic acid 600 MG capsule Take 1 capsule (600 mg) by mouth daily 90 capsule 3     apixaban ANTICOAGULANT (ELIQUIS ANTICOAGULANT) 5 MG tablet TAKE 1 TABLET(5 MG) BY MOUTH TWICE DAILY 180 tablet 2     atorvastatin (LIPITOR) 10 MG tablet Take 1 tablet (10 mg) by mouth daily 90 tablet 3     carboxymethylcellulose (REFRESH LIQUIGEL) 1 % ophthalmic solution Place 1 drop into both eyes 4 times daily 15 mL 11     cetirizine (ZYRTEC) 10 MG tablet Take 1 tablet (10 mg) by mouth every morning. 90 tablet 1     clobetasol (TEMOVATE) 0.05 % external ointment Apply topically 2 times daily. 15 g 2     Continuous Glucose Sensor (DEXCOM G7 SENSOR) MISC Change every 10 days. 3 each 5     cyanocobalamin (VITAMIN B-12) 500 MCG tablet Take 1 tablet (500 mcg) by mouth daily 90 tablet 3     cyclobenzaprine (FLEXERIL) 5 MG tablet Take 1 tablet (5 mg) by  mouth 2 times daily as needed for muscle spasms. 60 tablet 0     cycloSPORINE (RESTASIS) 0.05 % ophthalmic emulsion Place 1 drop into both eyes 2 times daily. 60 each 11     doxycycline hyclate (VIBRA-TABS) 100 MG tablet Take 1 tablet (100 mg) by mouth 2 times daily. For COPD flare 10 tablet 3     DULoxetine (CYMBALTA) 60 MG capsule Take 1 capsule (60 mg) by mouth 2 times daily. 60 capsule 3     empagliflozin (JARDIANCE) 25 MG TABS tablet Take 1 tablet (25 mg) by mouth every morning. 90 tablet 3     EPINEPHrine (ANY BX GENERIC EQUIV) 0.3 MG/0.3ML injection 2-pack Inject 0.3 mLs (0.3 mg) into the muscle as needed for anaphylaxis (related to bee stings) May repeat one time in 5-15 minutes if response to initial dose is inadequate. 2 each 1     fluconazole (DIFLUCAN) 150 MG tablet Take 1 tablet (150 mg) by mouth every 3 days. 3 tablet 0     fluticasone (FLONASE) 50 MCG/ACT nasal spray Spray 1 spray into both nostrils 2 times daily Use at night before bed 15.8 mL 3     Fluticasone-Umeclidin-Vilanterol (TRELEGY ELLIPTA) 200-62.5-25 MCG/ACT oral inhaler USE 1 INHALATION DAILY 28 each 5     Glucagon (GVOKE HYPOPEN) 1 MG/0.2ML pen Inject the contents of 1 device under the skin into lower abdomen, outer thigh, or outer upper arm as needed for hypoglycemia. If no response after 15 minutes, additional 1 mg dose from a new device may be injected while waiting for emergency assistance. 0.4 mL 1     GLUCOSAMINE-CHONDROITIN -400 MG tablet TAKE 1 TABLET DAILY 90 tablet 3     insulin glargine (LANTUS PEN) 100 UNIT/ML pen Inject 6 Units subcutaneously at bedtime. 15 mL 1     insulin pen needle (32G X 4 MM) 32G X 4 MM miscellaneous Use 4 pen needles daily or as directed. 360 each 3     levalbuterol (XOPENEX) 1.25 MG/3ML neb solution Take 3 mLs (1.25 mg) by nebulization every 4 hours as needed for wheezing.       losartan (COZAAR) 50 MG tablet Take 1 tablet (50 mg) by mouth daily. 90 tablet 3     miconazole (MICATIN) 2 % external  powder Apply topically as needed for itching or other. Please show least expensive option for purchase. 43 g 0     NOVOLOG FLEXPEN 100 UNIT/ML soln Inject 4-7  units four times daily subcutaneous approx 30 units daily 30 mL 1     Nutritional Supplements (GLUCERNA SHAKE) LIQD Take 1 Can by mouth 3 times daily (with meals).       omega-3 acid ethyl esters (LOVAZA) 1 g capsule Take 2 capsules (2 g) by mouth 2 times daily 360 capsule 3     omeprazole (PRILOSEC) 20 MG DR capsule Take 1 capsule (20 mg) by mouth 2 times daily. 180 capsule 3     oxyCODONE (OXY-IR) 5 MG capsule Take 5 mg by mouth every 4 hours as needed for severe pain.       polyethylene glycol (MIRALAX) 17 GM/Dose powder Take 17 g by mouth daily       polyethylene glycol-propylene glycol (SYSTANE) 0.4-0.3 % SOLN ophthalmic solution Place 1 drop into both eyes 4 times daily 5 mL 11     predniSONE (DELTASONE) 20 MG tablet Take 40 mg daily for 5 days if needed for treatment of a COPD exacerbation, use alongside doxycycline 10 tablet 2     pregabalin (LYRICA) 150 MG capsule Take 1 capsule (150 mg) by mouth 2 times daily TAKE 1 CAPSULE(150 MG) BY MOUTH TWICE DAILY 180 capsule 3     roflumilast (DALIRESP) 500 MCG TABS tablet Take 1 tablet (500 mcg) by mouth every morning. 90 tablet 3     traMADol (ULTRAM) 50 MG tablet Take 1 tablet (50 mg) by mouth every 6 hours as needed for severe pain. 50 tablet 0     Vitamin D, Cholecalciferol, 25 MCG (1000 UT) CAPS Take 25 mcg by mouth daily. 200 capsule 1     predniSONE (DELTASONE) 5 MG tablet  (Patient not taking: Reported on 7/24/2025)       Histories reviewed and updated in Epic.  Past Medical History:   Diagnosis Date     Adenocarcinoma, lung (H)      Asthma      Chronic left-sided low back pain with left-sided sciatica 08/16/2024     Chronic respiratory failure with hypoxia (H) 08/16/2024     Ectopic pregnancy      Esophageal reflux      Failure to thrive in adult 08/14/2024     Pulmonary emphysema (H)     Very severe  FEV1<30% predicted     Type II diabetes mellitus (H)        Past Surgical History:   Procedure Laterality Date     2/8/16                R thoracotomy, RLL lobectomy (Dr. Cunha). Adenocarcinoma, 1.1 cm, assoicated with atypical adenomatous hyperplasia Right 02/08/2016    R thoracotomy, RLL lobectomy (Dr. Cunha). Adenocarcinoma, 1.1 cm, assoicated with atypical adenomatous hyperplasia     BRONCHOSCOPY, WITH BIOPSY, ROBOT ASSISTED N/A 07/19/2023    Procedure: robot assisted Ion BRONCHOSCOPY, WITH BIOPSY;  Surgeon: Patria Garcia MD;  Location: [facility] OR     CATARACT IOL, RT/LT       ENDOBRONCHIAL ULTRASOUND FLEXIBLE N/A 07/19/2023    Procedure: Endobronchial ultrasound flexible;  Surgeon: Patria Garcia MD;  Location: [facility] OR     ESOPHAGOSCOPY, GASTROSCOPY, DUODENOSCOPY (EGD), COMBINED N/A 08/16/2022    Procedure: ESOPHAGOGASTRODUODENOSCOPY (EGD);  Surgeon: Travis Briseno MD;  Location:  GI     ESOPHAGOSCOPY, GASTROSCOPY, DUODENOSCOPY (EGD), COMBINED N/A 08/08/2023    Procedure: ESOPHAGOGASTRODUODENOSCOPY, WITH BIOPSY;  Surgeon: Chao Rodrigues DO;  Location:  GI     INJECT EPIDURAL TRANSFORAMINAL LUMBAR / SACRAL EA ADDITIONAL LEVEL Bilateral 11/18/2024    Procedure: Bilateral L4-5 transforaminal epidural steroid injection;  Surgeon: Bony An MD;  Location: [facility] OR     ORTHOPEDIC SURGERY       PHACOEMULSIFICATION CLEAR CORNEA WITH STANDARD INTRAOCULAR LENS IMPLANT Left 08/24/2023    Procedure: LEFT EYE PHACOEMULSIFICATION, CATARACT, WITH INTRAOCULAR LENS IMPLANT;  Surgeon: Re Keita MD;  Location: [facility] OR     PHACOEMULSIFICATION CLEAR CORNEA WITH STANDARD INTRAOCULAR LENS IMPLANT Right 09/05/2023    Procedure: RIGHT EYE PHACOEMULSIFICATION, CATARACT, WITH INTRAOCULAR LENS IMPLANT;  Surgeon: Re Keita MD;  Location: [facility] OR       Allergies:  Aspirin, Bee venom, Penicillins, Acetaminophen, Azithromycin, Colon care, Interferons, and Metformin    Family History   Problem Relation Age of Onset      Thyroid Disease Mother      Cerebrovascular Disease Mother      Hypertension Mother      Hypertension Father      Glaucoma Father      Cancer Sister      Lung Cancer Sister      Diabetes Brother      Cancer Brother      Diabetes Brother      Cancer Brother      Diabetes Brother      Deep Vein Thrombosis (DVT) Daughter      Depression Daughter      Alcohol/Drug Other         self     Diabetes Other         self     Thyroid Disease Other         self     Asthma Other         self     Macular Degeneration No family hx of      Anesthesia Reaction No family hx of      Social History     Tobacco Use     Smoking status: Former     Current packs/day: 0.00     Types: Cigarettes     Quit date:      Years since quittin.5     Passive exposure: Past     Smokeless tobacco: Never     Tobacco comments:     2023 Patient using 14 mg patch, wants to have prescription for Nicotrol inhaler, took workbook   Substance Use Topics     Alcohol use: Not Currently       Physical examination:  Vitals: BP (!) 147/75   Pulse 99   Wt 77.1 kg (170 lb)   SpO2 94%   BMI 28.29 kg/m      BMIE= Body mass index is 28.29 kg/m .  Exam:  Constitutional: healthy, alert, no acute distress  Head: Normocephalic. No masses, lesions, no exophthalmos/proptosis  ENT: normal thyroid, no palpable nodules, no cervical lymph nodes  Respiratory: nonlabored  Gastrointestinal: Abdomen soft, non-tender.  Musculoskeletal: extremities normal- no gross deformities noted, gait normal and normal muscle tone  Skin: no suspicious lesions or rashes  Neurologic: Gait normal. sensation grossly intact  Psychiatric: mentation appears normal, calm    Foot Exam: normal DP and PT pulses, no trophic changes or ulcerative lesions, and abnormal sensory exam    Most Recent Laboratory Tests:  @LABRCNTIP(hgb:1)@  @LABRCNTIP(A1c:1)@  @LABRCNTIP(cr:1)@  @LABRCNTIP(g,bgm:6)@  Recent Labs   Lab Test 23  0833 10/26/23  0912   CHOL 166 220*   HDL 76 55   LDL 77 146*  "  TRIG 64 96     Lab Results   Component Value Date    MICROL 158.0 08/23/2023     No results found for: \"MICROALBUMIN\"          Attestation signed by Sarah Yousif MBBS at 7/26/2025 11:24 AM:  Physician Attestation  I, ADY Finney, saw this patient with the fellow and agree with the fellow's findings and plan of care as documented in Dr. Reece s note.      I personally reviewed vital signs, medications, and labs.    Key findings:   70 years old female was seen today in the clinic for follow-up for DM type II not well-controlled A1c today 9.4%.  Currently on Lantus 6 units daily, Jardiance 25 mg daily, NovoLog 5 units with meals however she does not use it that if her BG within acceptable range.    She has Dexcom G7 data for the last 2 weeks was reviewed GMI 8.4% low 0%.  Her BG drops over the night closer to the goal range however she has persistent postprandial hyperglycemia throughout the day.    Plan:  Continue Lantus 6 units daily.  Continue Jardiance 25 mg daily.  To start using NovoLog with meals according to the BG readings before eating:  <80 no novolog  < 2 units  150-200 4 units  200-250 6 units  250-300 8 units  >350 10 units  To start using 2 units of NovoLog for glucemia and snacks.  Referral to podiatry is placed today.    ADY Finney  Date of Service (when I saw the patient): Jul 24, 2025                 Again, thank you for allowing me to participate in the care of your patient.      Sincerely,    Carrie Reece MD    "

## 2025-07-24 NOTE — PATIENT INSTRUCTIONS
Continue glargine insulin 6 units daily  Novolog  <80 no novolog  < 2 units  150-200 4 units  200-250 6 units  250-300 8 units  >350 10 units  Novolog 2 units with glucerna, coffee with sugar and snacks  Follow up in 3 months  Referral for podiatry

## 2025-07-24 NOTE — NURSING NOTE
Chief Complaint   Patient presents with    Diabetes     BP (!) 147/75   Pulse 99   Wt 77.1 kg (170 lb)   SpO2 94%   BMI 28.29 kg/m

## 2025-07-25 RX ORDER — FLUTICASONE PROPIONATE 50 MCG
SPRAY, SUSPENSION (ML) NASAL
Qty: 16 G | OUTPATIENT
Start: 2025-07-25

## 2025-07-25 NOTE — TELEPHONE ENCOUNTER
Last Written Prescription:  fluticasone (FLONASE) 50 MCG/ACT nasal spray 15.8 mL 3 7/9/2024     ----------------------  Last Visit Date: 4/15/25  Future Visit Date: 11/6/25--Jayroneena  ----------------------    Refill decision:     [x] Medication unable to be refilled by RN due to: Medication not included in refill protocol policy       Request from pharmacy:  Requested Prescriptions   Pending Prescriptions Disp Refills    fluticasone (FLONASE) 50 MCG/ACT nasal spray [Pharmacy Med Name: FLUTICASONE 50MCG NASAL SP (120) RX] 16 g      Sig: INSTILL 1 SPRAY IN EACH NOSTRIL TWICE DAILY FOR SEASONAL ALLERGIC RHINITIS       Nasal Allergy Protocol Failed - 7/25/2025 11:16 AM        Failed - Medication is active on med list and the sig matches. RN to manually verify dose and sig if red X/fail.     If the protocol passes (green check), you do not need to verify med dose and sig.    A prescription matches if they are the same clinical intention.    For Example: once daily and every morning are the same.    The protocol can not identify upper and lower case letters as matching and will fail.     For Example: Take 1 tablet (50 mg) by mouth daily     TAKE 1 TABLET (50 MG) BY MOUTH DAILY    For all fails (red x), verify dose and sig.    If the refill does match what is on file, the RN can still proceed to approve the refill request.       If they do not match, route to the appropriate provider.             Passed - Patient is age 12 or older        Passed - Recent (12 month) or future (90 days) visit with authorizing provider's specialty (provided they have been seen in the past 15 months)     The patient must have completed an in-person or virtual visit within the past 12 months or has a future visit scheduled within the next 90 days with the authorizing provider s specialty.  Urgent care and e-visits do not qualify as an office visit for this protocol.          Passed - Medication indicated for associated diagnosis     Medication  is associated with one or more of the following diagnoses:   Allergic conjunctivitis  Allergies  Nasal congestion  Nasal discharge  Rhinitis  Sinus congestion  Recurrent acute maxillary sinusitis  Environmental allergies   Acute sinusitis   Cystic Fibrosis  Bronchiectasis

## 2025-07-27 DIAGNOSIS — J44.9 CHRONIC OBSTRUCTIVE PULMONARY DISEASE, UNSPECIFIED COPD TYPE (H): ICD-10-CM

## 2025-07-27 RX ORDER — FLUTICASONE FUROATE, UMECLIDINIUM BROMIDE AND VILANTEROL TRIFENATATE 200; 62.5; 25 UG/1; UG/1; UG/1
POWDER RESPIRATORY (INHALATION)
Qty: 28 EACH | Refills: 5 | Status: SHIPPED | OUTPATIENT
Start: 2025-07-27

## 2025-07-28 ENCOUNTER — PATIENT OUTREACH (OUTPATIENT)
Dept: CARE COORDINATION | Facility: CLINIC | Age: 70
End: 2025-07-28
Payer: COMMERCIAL

## 2025-08-21 DIAGNOSIS — E11.65 TYPE 2 DIABETES MELLITUS WITH HYPERGLYCEMIA, WITHOUT LONG-TERM CURRENT USE OF INSULIN (H): ICD-10-CM

## 2025-08-25 RX ORDER — ACYCLOVIR 400 MG/1
TABLET ORAL
Qty: 3 EACH | Refills: 5 | Status: SHIPPED | OUTPATIENT
Start: 2025-08-25

## 2025-08-26 ENCOUNTER — OFFICE VISIT (OUTPATIENT)
Dept: ORTHOPEDICS | Facility: CLINIC | Age: 70
End: 2025-08-26
Payer: COMMERCIAL

## 2025-08-26 DIAGNOSIS — L85.3 XEROSIS OF SKIN: ICD-10-CM

## 2025-08-26 DIAGNOSIS — E11.65 TYPE II OR UNSPECIFIED TYPE DIABETES MELLITUS WITH NEUROLOGICAL MANIFESTATIONS, UNCONTROLLED(250.62) (H): ICD-10-CM

## 2025-08-26 DIAGNOSIS — E11.49 TYPE II OR UNSPECIFIED TYPE DIABETES MELLITUS WITH NEUROLOGICAL MANIFESTATIONS, UNCONTROLLED(250.62) (H): ICD-10-CM

## 2025-08-26 DIAGNOSIS — L84 TYLOMA: Primary | ICD-10-CM

## 2025-08-26 PROCEDURE — 11055 PARING/CUTG B9 HYPRKER LES 1: CPT | Performed by: PODIATRIST

## 2025-08-26 PROCEDURE — 99203 OFFICE O/P NEW LOW 30 MIN: CPT | Mod: 25 | Performed by: PODIATRIST

## 2025-08-26 RX ORDER — AMMONIUM LACTATE 12 G/100G
CREAM TOPICAL 2 TIMES DAILY
Qty: 385 G | Refills: 11 | Status: SHIPPED | OUTPATIENT
Start: 2025-08-26

## (undated) DEVICE — NDL ASPIRATION VIZISHOT 21GX2MM NA-U401SX-4021-A

## (undated) DEVICE — KIT ENDO FIRST STEP DISINFECTANT 200ML W/POUCH EP-4

## (undated) DEVICE — SU ETHILON 10-0 CS160-6 12" 9000G

## (undated) DEVICE — GLOVE BIOGEL PI ULTRATOUCH G SZ 7.0 42170

## (undated) DEVICE — LUBRICANT INST KIT ENDO-LUBE 220-90

## (undated) DEVICE — EYE TIP IRRIGATION & ASPIRATION POLYMER CVD 0.3MM 8065751512

## (undated) DEVICE — ENDO VALVE SYR NDL KIT ULTRASOUND BRONCH NA-201SX-4022-A

## (undated) DEVICE — PITCHER STERILE 1000ML  SSK9004A

## (undated) DEVICE — EYE KNIFE STILETTO VISITEC 1.1MM ANG 45DEG SIDEPORT 376620

## (undated) DEVICE — ENDO VALVE BX EVIS MAJ-210

## (undated) DEVICE — EYE SHIELD PLASTIC

## (undated) DEVICE — EYE PACK CUSTOM ANTERIOR 30DEG TIP CENTURION PPK6682-04

## (undated) DEVICE — PACK CATARACT CUSTOM ASC SEY15CPUMC

## (undated) DEVICE — ENDO ADPT BRONCH SWIVEL PORTEX UNSTERILE

## (undated) DEVICE — EYE KNIFE SLIT XSTAR VISITEC 2.6MM 45DEG 373726

## (undated) DEVICE — BAG INSTRUMENT IV VISION ION 490127

## (undated) DEVICE — SYR 10ML SLIP TIP W/O NDL 303134

## (undated) DEVICE — SYR 30ML SLIP TIP W/O NDL 302833

## (undated) DEVICE — EYE CANN IRR 25GA CYSTOTOME 581610

## (undated) DEVICE — TRAY PAIN INJECTION 97A 640

## (undated) DEVICE — ENDO VALVE SUCTION ULTRASOUND BRONCH MAJ-1414

## (undated) DEVICE — SOL WATER IRRIG 500ML BOTTLE 2F7113

## (undated) DEVICE — NDL BIOPSY 21G FLEXISION ION 490103

## (undated) DEVICE — ENDO NDL ULTRASOUND 19GA EXPECT FLEX M00550040

## (undated) DEVICE — LABEL MEDICATION SYSTEM 3303-P

## (undated) DEVICE — ENDO ADPT BRONCH SWIVEL Y A1002

## (undated) DEVICE — PREP CHLORAPREP W/ORANGE TINT 10.5ML 930715

## (undated) DEVICE — CONNECTOR SWIVEL 7.0MM ION 490108

## (undated) DEVICE — LINEN TOWEL PACK X5 5464

## (undated) DEVICE — ADAPTER PROBE/SUCTION VISION ION 490101

## (undated) DEVICE — ENDO VALVE SUCTION BRONCH EVIS MAJ-209

## (undated) DEVICE — SOL NACL 0.9% IRRIG 1000ML BOTTLE 2F7124

## (undated) DEVICE — GLOVE BIOGEL PI MICRO SZ 7.0 48570

## (undated) DEVICE — EYE CANN IRR 27GA ANTERIOR CHAMBER 581280

## (undated) DEVICE — TUBING SUCTION 10'X3/16" N510

## (undated) RX ORDER — FENTANYL CITRATE 50 UG/ML
INJECTION, SOLUTION INTRAMUSCULAR; INTRAVENOUS
Status: DISPENSED
Start: 2022-08-16

## (undated) RX ORDER — FENTANYL CITRATE 50 UG/ML
INJECTION, SOLUTION INTRAMUSCULAR; INTRAVENOUS
Status: DISPENSED
Start: 2024-07-22

## (undated) RX ORDER — SODIUM CHLORIDE 9 MG/ML
INJECTION, SOLUTION INTRAVENOUS
Status: DISPENSED
Start: 2024-07-22

## (undated) RX ORDER — ALBUTEROL SULFATE 0.83 MG/ML
SOLUTION RESPIRATORY (INHALATION)
Status: DISPENSED
Start: 2020-12-15

## (undated) RX ORDER — ONDANSETRON 2 MG/ML
INJECTION INTRAMUSCULAR; INTRAVENOUS
Status: DISPENSED
Start: 2023-09-05

## (undated) RX ORDER — FENTANYL CITRATE 50 UG/ML
INJECTION, SOLUTION INTRAMUSCULAR; INTRAVENOUS
Status: DISPENSED
Start: 2023-09-05

## (undated) RX ORDER — LIDOCAINE HYDROCHLORIDE 20 MG/ML
JELLY TOPICAL
Status: DISPENSED
Start: 2023-06-28

## (undated) RX ORDER — ONDANSETRON 2 MG/ML
INJECTION INTRAMUSCULAR; INTRAVENOUS
Status: DISPENSED
Start: 2023-08-24

## (undated) RX ORDER — BUPIVACAINE HYDROCHLORIDE 5 MG/ML
INJECTION, SOLUTION EPIDURAL; INTRACAUDAL
Status: DISPENSED
Start: 2024-07-22

## (undated) RX ORDER — DIPHENHYDRAMINE HYDROCHLORIDE 50 MG/ML
INJECTION INTRAMUSCULAR; INTRAVENOUS
Status: DISPENSED
Start: 2024-07-22

## (undated) RX ORDER — FENTANYL CITRATE 50 UG/ML
INJECTION, SOLUTION INTRAMUSCULAR; INTRAVENOUS
Status: DISPENSED
Start: 2023-08-24

## (undated) RX ORDER — PROPOFOL 10 MG/ML
INJECTION, EMULSION INTRAVENOUS
Status: DISPENSED
Start: 2023-07-19

## (undated) RX ORDER — FENTANYL CITRATE 50 UG/ML
INJECTION, SOLUTION INTRAMUSCULAR; INTRAVENOUS
Status: DISPENSED
Start: 2023-07-19